# Patient Record
Sex: MALE | Race: WHITE | NOT HISPANIC OR LATINO
[De-identification: names, ages, dates, MRNs, and addresses within clinical notes are randomized per-mention and may not be internally consistent; named-entity substitution may affect disease eponyms.]

---

## 2017-01-11 ENCOUNTER — MESSAGE (OUTPATIENT)
Age: 67
End: 2017-01-11

## 2017-02-13 ENCOUNTER — RX RENEWAL (OUTPATIENT)
Age: 67
End: 2017-02-13

## 2017-02-14 ENCOUNTER — APPOINTMENT (OUTPATIENT)
Dept: ORTHOPEDIC SURGERY | Facility: CLINIC | Age: 67
End: 2017-02-14

## 2017-02-14 DIAGNOSIS — Z78.9 OTHER SPECIFIED HEALTH STATUS: ICD-10-CM

## 2017-02-14 DIAGNOSIS — Z56.0 UNEMPLOYMENT, UNSPECIFIED: ICD-10-CM

## 2017-02-14 DIAGNOSIS — M75.41 IMPINGEMENT SYNDROME OF RIGHT SHOULDER: ICD-10-CM

## 2017-02-14 DIAGNOSIS — Z86.79 PERSONAL HISTORY OF OTHER DISEASES OF THE CIRCULATORY SYSTEM: ICD-10-CM

## 2017-02-14 SDOH — ECONOMIC STABILITY - INCOME SECURITY: UNEMPLOYMENT, UNSPECIFIED: Z56.0

## 2017-02-23 ENCOUNTER — RX RENEWAL (OUTPATIENT)
Age: 67
End: 2017-02-23

## 2017-03-06 ENCOUNTER — RX RENEWAL (OUTPATIENT)
Age: 67
End: 2017-03-06

## 2017-03-28 ENCOUNTER — APPOINTMENT (OUTPATIENT)
Dept: ORTHOPEDIC SURGERY | Facility: CLINIC | Age: 67
End: 2017-03-28

## 2017-03-28 DIAGNOSIS — M75.41 IMPINGEMENT SYNDROME OF RIGHT SHOULDER: ICD-10-CM

## 2017-04-03 ENCOUNTER — RX RENEWAL (OUTPATIENT)
Age: 67
End: 2017-04-03

## 2017-05-05 ENCOUNTER — NON-APPOINTMENT (OUTPATIENT)
Age: 67
End: 2017-05-05

## 2017-05-05 ENCOUNTER — APPOINTMENT (OUTPATIENT)
Dept: CARDIOLOGY | Facility: CLINIC | Age: 67
End: 2017-05-05

## 2017-05-05 VITALS
BODY MASS INDEX: 26.2 KG/M2 | HEART RATE: 77 BPM | HEIGHT: 66 IN | DIASTOLIC BLOOD PRESSURE: 86 MMHG | SYSTOLIC BLOOD PRESSURE: 154 MMHG | WEIGHT: 163 LBS | OXYGEN SATURATION: 98 %

## 2017-05-19 ENCOUNTER — LABORATORY RESULT (OUTPATIENT)
Age: 67
End: 2017-05-19

## 2017-05-19 ENCOUNTER — APPOINTMENT (OUTPATIENT)
Dept: INTERNAL MEDICINE | Facility: CLINIC | Age: 67
End: 2017-05-19

## 2017-05-19 VITALS
RESPIRATION RATE: 17 BRPM | BODY MASS INDEX: 26.03 KG/M2 | TEMPERATURE: 97.7 F | HEART RATE: 88 BPM | SYSTOLIC BLOOD PRESSURE: 140 MMHG | HEIGHT: 66 IN | WEIGHT: 162 LBS | OXYGEN SATURATION: 96 % | DIASTOLIC BLOOD PRESSURE: 92 MMHG

## 2017-05-19 VITALS — SYSTOLIC BLOOD PRESSURE: 128 MMHG | DIASTOLIC BLOOD PRESSURE: 84 MMHG

## 2017-05-19 DIAGNOSIS — Z23 ENCOUNTER FOR IMMUNIZATION: ICD-10-CM

## 2017-05-24 LAB
25(OH)D3 SERPL-MCNC: 38.2 NG/ML
ALBUMIN SERPL ELPH-MCNC: 4.8 G/DL
ALP BLD-CCNC: 110 U/L
ALT SERPL-CCNC: 18 U/L
ANION GAP SERPL CALC-SCNC: 17 MMOL/L
APPEARANCE: CLEAR
AST SERPL-CCNC: 19 U/L
BASOPHILS # BLD AUTO: 0.03 K/UL
BASOPHILS NFR BLD AUTO: 0.2 %
BILIRUB SERPL-MCNC: 0.7 MG/DL
BILIRUBIN URINE: NEGATIVE
BLOOD URINE: NEGATIVE
BUN SERPL-MCNC: 22 MG/DL
CALCIUM SERPL-MCNC: 9.9 MG/DL
CHLORIDE SERPL-SCNC: 98 MMOL/L
CHOLEST SERPL-MCNC: 187 MG/DL
CHOLEST/HDLC SERPL: 3.5 RATIO
CO2 SERPL-SCNC: 24 MMOL/L
COLOR: YELLOW
CREAT SERPL-MCNC: 1.19 MG/DL
CREAT SPEC-SCNC: 97 MG/DL
EOSINOPHIL # BLD AUTO: 0.17 K/UL
EOSINOPHIL NFR BLD AUTO: 1.3 %
FOLATE SERPL-MCNC: 15 NG/ML
GLUCOSE QUALITATIVE U: NORMAL MG/DL
GLUCOSE SERPL-MCNC: 157 MG/DL
HBA1C MFR BLD HPLC: 6.8 %
HCT VFR BLD CALC: 48.2 %
HCV AB SER QL: NONREACTIVE
HCV S/CO RATIO: 0.08 S/CO
HDLC SERPL-MCNC: 54 MG/DL
HGB BLD-MCNC: 16.3 G/DL
IMM GRANULOCYTES NFR BLD AUTO: 0.6 %
KETONES URINE: NEGATIVE
LDLC SERPL CALC-MCNC: 96 MG/DL
LEUKOCYTE ESTERASE URINE: NEGATIVE
LYMPHOCYTES # BLD AUTO: 1.96 K/UL
LYMPHOCYTES NFR BLD AUTO: 15 %
MAN DIFF?: NORMAL
MCHC RBC-ENTMCNC: 29.4 PG
MCHC RBC-ENTMCNC: 33.8 GM/DL
MCV RBC AUTO: 87 FL
MICROALBUMIN 24H UR DL<=1MG/L-MCNC: 20.4 MG/DL
MICROALBUMIN/CREAT 24H UR-RTO: 210
MONOCYTES # BLD AUTO: 1.2 K/UL
MONOCYTES NFR BLD AUTO: 9.2 %
NEUTROPHILS # BLD AUTO: 9.59 K/UL
NEUTROPHILS NFR BLD AUTO: 73.7 %
NITRITE URINE: NEGATIVE
PH URINE: 5.5
PLATELET # BLD AUTO: 250 K/UL
POTASSIUM SERPL-SCNC: 4.2 MMOL/L
PROT SERPL-MCNC: 7.1 G/DL
PROTEIN URINE: 30 MG/DL
PSA SERPL-MCNC: 1.81 NG/ML
RBC # BLD: 5.54 M/UL
RBC # FLD: 15.3 %
SODIUM SERPL-SCNC: 139 MMOL/L
SPECIFIC GRAVITY URINE: 1.02
TRIGL SERPL-MCNC: 186 MG/DL
TSH SERPL-ACNC: 1.15 UIU/ML
UROBILINOGEN URINE: NORMAL MG/DL
VIT B12 SERPL-MCNC: 644 PG/ML
WBC # FLD AUTO: 13.03 K/UL

## 2017-05-26 ENCOUNTER — MESSAGE (OUTPATIENT)
Age: 67
End: 2017-05-26

## 2017-06-01 ENCOUNTER — RX RENEWAL (OUTPATIENT)
Age: 67
End: 2017-06-01

## 2017-06-20 ENCOUNTER — RX RENEWAL (OUTPATIENT)
Age: 67
End: 2017-06-20

## 2017-08-16 ENCOUNTER — RX RENEWAL (OUTPATIENT)
Age: 67
End: 2017-08-16

## 2017-08-25 ENCOUNTER — RX RENEWAL (OUTPATIENT)
Age: 67
End: 2017-08-25

## 2017-08-31 ENCOUNTER — RX RENEWAL (OUTPATIENT)
Age: 67
End: 2017-08-31

## 2017-08-31 ENCOUNTER — MEDICATION RENEWAL (OUTPATIENT)
Age: 67
End: 2017-08-31

## 2017-09-24 ENCOUNTER — RX RENEWAL (OUTPATIENT)
Age: 67
End: 2017-09-24

## 2017-10-02 ENCOUNTER — RX RENEWAL (OUTPATIENT)
Age: 67
End: 2017-10-02

## 2017-10-04 ENCOUNTER — MEDICATION RENEWAL (OUTPATIENT)
Age: 67
End: 2017-10-04

## 2017-10-31 ENCOUNTER — APPOINTMENT (OUTPATIENT)
Dept: CARDIOLOGY | Facility: CLINIC | Age: 67
End: 2017-10-31
Payer: COMMERCIAL

## 2017-10-31 VITALS
HEART RATE: 78 BPM | WEIGHT: 162 LBS | SYSTOLIC BLOOD PRESSURE: 158 MMHG | OXYGEN SATURATION: 97 % | DIASTOLIC BLOOD PRESSURE: 98 MMHG | BODY MASS INDEX: 26.03 KG/M2 | HEIGHT: 66 IN

## 2017-10-31 VITALS — DIASTOLIC BLOOD PRESSURE: 80 MMHG | SYSTOLIC BLOOD PRESSURE: 130 MMHG

## 2017-10-31 PROCEDURE — 93000 ELECTROCARDIOGRAM COMPLETE: CPT

## 2017-10-31 PROCEDURE — 99215 OFFICE O/P EST HI 40 MIN: CPT

## 2017-11-26 ENCOUNTER — RX RENEWAL (OUTPATIENT)
Age: 67
End: 2017-11-26

## 2017-12-01 ENCOUNTER — MESSAGE (OUTPATIENT)
Age: 67
End: 2017-12-01

## 2017-12-03 ENCOUNTER — FORM ENCOUNTER (OUTPATIENT)
Age: 67
End: 2017-12-03

## 2017-12-04 ENCOUNTER — APPOINTMENT (OUTPATIENT)
Dept: INTERNAL MEDICINE | Facility: CLINIC | Age: 67
End: 2017-12-04
Payer: COMMERCIAL

## 2017-12-04 ENCOUNTER — OUTPATIENT (OUTPATIENT)
Dept: OUTPATIENT SERVICES | Facility: HOSPITAL | Age: 67
LOS: 1 days | End: 2017-12-04
Payer: COMMERCIAL

## 2017-12-04 ENCOUNTER — APPOINTMENT (OUTPATIENT)
Dept: RADIOLOGY | Facility: CLINIC | Age: 67
End: 2017-12-04
Payer: COMMERCIAL

## 2017-12-04 ENCOUNTER — RX RENEWAL (OUTPATIENT)
Age: 67
End: 2017-12-04

## 2017-12-04 VITALS
RESPIRATION RATE: 17 BRPM | DIASTOLIC BLOOD PRESSURE: 90 MMHG | HEIGHT: 66 IN | BODY MASS INDEX: 26.03 KG/M2 | WEIGHT: 162 LBS | OXYGEN SATURATION: 98 % | HEART RATE: 83 BPM | SYSTOLIC BLOOD PRESSURE: 140 MMHG | TEMPERATURE: 97.9 F

## 2017-12-04 DIAGNOSIS — J06.9 ACUTE UPPER RESPIRATORY INFECTION, UNSPECIFIED: ICD-10-CM

## 2017-12-04 DIAGNOSIS — Z87.09 PERSONAL HISTORY OF OTHER DISEASES OF THE RESPIRATORY SYSTEM: ICD-10-CM

## 2017-12-04 DIAGNOSIS — R05 COUGH: ICD-10-CM

## 2017-12-04 PROCEDURE — 71046 X-RAY EXAM CHEST 2 VIEWS: CPT

## 2017-12-04 PROCEDURE — 71020: CPT | Mod: 26

## 2017-12-04 PROCEDURE — 99214 OFFICE O/P EST MOD 30 MIN: CPT

## 2017-12-08 ENCOUNTER — MESSAGE (OUTPATIENT)
Age: 67
End: 2017-12-08

## 2017-12-18 ENCOUNTER — RX RENEWAL (OUTPATIENT)
Age: 67
End: 2017-12-18

## 2017-12-21 ENCOUNTER — OTHER (OUTPATIENT)
Age: 67
End: 2017-12-21

## 2017-12-21 ENCOUNTER — MEDICATION RENEWAL (OUTPATIENT)
Age: 67
End: 2017-12-21

## 2017-12-31 ENCOUNTER — RX RENEWAL (OUTPATIENT)
Age: 67
End: 2017-12-31

## 2018-01-29 ENCOUNTER — RX RENEWAL (OUTPATIENT)
Age: 68
End: 2018-01-29

## 2018-02-09 ENCOUNTER — LABORATORY RESULT (OUTPATIENT)
Age: 68
End: 2018-02-09

## 2018-02-09 ENCOUNTER — APPOINTMENT (OUTPATIENT)
Dept: INTERNAL MEDICINE | Facility: CLINIC | Age: 68
End: 2018-02-09
Payer: COMMERCIAL

## 2018-02-09 VITALS
HEART RATE: 84 BPM | DIASTOLIC BLOOD PRESSURE: 90 MMHG | OXYGEN SATURATION: 97 % | BODY MASS INDEX: 26.36 KG/M2 | HEIGHT: 66 IN | RESPIRATION RATE: 17 BRPM | TEMPERATURE: 97.5 F | WEIGHT: 164 LBS | SYSTOLIC BLOOD PRESSURE: 137 MMHG

## 2018-02-09 DIAGNOSIS — Z80.8 FAMILY HISTORY OF MALIGNANT NEOPLASM OF OTHER ORGANS OR SYSTEMS: ICD-10-CM

## 2018-02-09 PROCEDURE — 99214 OFFICE O/P EST MOD 30 MIN: CPT

## 2018-02-09 RX ORDER — BENZONATATE 200 MG/1
200 CAPSULE ORAL
Qty: 10 | Refills: 0 | Status: DISCONTINUED | COMMUNITY
Start: 2017-12-04 | End: 2018-02-09

## 2018-02-09 RX ORDER — AMOXICILLIN AND CLAVULANATE POTASSIUM 875; 125 MG/1; MG/1
875-125 TABLET, COATED ORAL TWICE DAILY
Qty: 14 | Refills: 0 | Status: DISCONTINUED | COMMUNITY
Start: 2017-12-08 | End: 2018-02-09

## 2018-02-13 ENCOUNTER — MESSAGE (OUTPATIENT)
Age: 68
End: 2018-02-13

## 2018-02-13 LAB
25(OH)D3 SERPL-MCNC: 35.9 NG/ML
ALBUMIN SERPL ELPH-MCNC: 4.8 G/DL
ALP BLD-CCNC: 119 U/L
ALT SERPL-CCNC: 29 U/L
ANION GAP SERPL CALC-SCNC: 20 MMOL/L
APPEARANCE: CLEAR
AST SERPL-CCNC: 24 U/L
BASOPHILS # BLD AUTO: 0.04 K/UL
BASOPHILS NFR BLD AUTO: 0.4 %
BILIRUB SERPL-MCNC: 1.1 MG/DL
BILIRUBIN URINE: NEGATIVE
BLOOD URINE: NEGATIVE
BUN SERPL-MCNC: 20 MG/DL
CALCIUM SERPL-MCNC: 9.7 MG/DL
CHLORIDE SERPL-SCNC: 100 MMOL/L
CHOLEST SERPL-MCNC: 200 MG/DL
CHOLEST/HDLC SERPL: 3.6 RATIO
CO2 SERPL-SCNC: 22 MMOL/L
COLOR: YELLOW
CREAT SERPL-MCNC: 1.18 MG/DL
CREAT SPEC-SCNC: 95 MG/DL
EOSINOPHIL # BLD AUTO: 0.13 K/UL
EOSINOPHIL NFR BLD AUTO: 1.2 %
GLUCOSE QUALITATIVE U: NEGATIVE MG/DL
GLUCOSE SERPL-MCNC: 155 MG/DL
HBA1C MFR BLD HPLC: 7.3 %
HCT VFR BLD CALC: 50.6 %
HDLC SERPL-MCNC: 55 MG/DL
HGB BLD-MCNC: 17.3 G/DL
IMM GRANULOCYTES NFR BLD AUTO: 0.4 %
KETONES URINE: NEGATIVE
LDLC SERPL CALC-MCNC: 107 MG/DL
LEUKOCYTE ESTERASE URINE: NEGATIVE
LYMPHOCYTES # BLD AUTO: 1.84 K/UL
LYMPHOCYTES NFR BLD AUTO: 16.4 %
MAN DIFF?: NORMAL
MCHC RBC-ENTMCNC: 30.2 PG
MCHC RBC-ENTMCNC: 34.2 GM/DL
MCV RBC AUTO: 88.3 FL
MICROALBUMIN 24H UR DL<=1MG/L-MCNC: 26.9 MG/DL
MICROALBUMIN/CREAT 24H UR-RTO: 283 MG/G
MONOCYTES # BLD AUTO: 1.17 K/UL
MONOCYTES NFR BLD AUTO: 10.4 %
NEUTROPHILS # BLD AUTO: 8.01 K/UL
NEUTROPHILS NFR BLD AUTO: 71.2 %
NITRITE URINE: NEGATIVE
PH URINE: 5.5
PLATELET # BLD AUTO: 239 K/UL
POTASSIUM SERPL-SCNC: 5.1 MMOL/L
PROT SERPL-MCNC: 7.6 G/DL
PROTEIN URINE: 30 MG/DL
RBC # BLD: 5.73 M/UL
RBC # FLD: 15.1 %
SODIUM SERPL-SCNC: 142 MMOL/L
SPECIFIC GRAVITY URINE: 1.02
TRIGL SERPL-MCNC: 190 MG/DL
TSH SERPL-ACNC: 1.2 UIU/ML
UROBILINOGEN URINE: NEGATIVE MG/DL
WBC # FLD AUTO: 11.24 K/UL

## 2018-02-27 ENCOUNTER — MESSAGE (OUTPATIENT)
Age: 68
End: 2018-02-27

## 2018-03-14 ENCOUNTER — RX RENEWAL (OUTPATIENT)
Age: 68
End: 2018-03-14

## 2018-03-27 ENCOUNTER — RX RENEWAL (OUTPATIENT)
Age: 68
End: 2018-03-27

## 2018-04-23 ENCOUNTER — RX RENEWAL (OUTPATIENT)
Age: 68
End: 2018-04-23

## 2018-04-30 ENCOUNTER — APPOINTMENT (OUTPATIENT)
Dept: NEPHROLOGY | Facility: CLINIC | Age: 68
End: 2018-04-30
Payer: COMMERCIAL

## 2018-04-30 ENCOUNTER — RX RENEWAL (OUTPATIENT)
Age: 68
End: 2018-04-30

## 2018-04-30 VITALS
WEIGHT: 162 LBS | HEART RATE: 80 BPM | HEIGHT: 66 IN | BODY MASS INDEX: 26.03 KG/M2 | DIASTOLIC BLOOD PRESSURE: 80 MMHG | SYSTOLIC BLOOD PRESSURE: 144 MMHG

## 2018-04-30 PROCEDURE — 99244 OFF/OP CNSLTJ NEW/EST MOD 40: CPT

## 2018-05-09 ENCOUNTER — RX RENEWAL (OUTPATIENT)
Age: 68
End: 2018-05-09

## 2018-05-11 ENCOUNTER — APPOINTMENT (OUTPATIENT)
Dept: CARDIOLOGY | Facility: CLINIC | Age: 68
End: 2018-05-11
Payer: COMMERCIAL

## 2018-05-11 ENCOUNTER — NON-APPOINTMENT (OUTPATIENT)
Age: 68
End: 2018-05-11

## 2018-05-11 VITALS
HEART RATE: 101 BPM | HEIGHT: 66 IN | SYSTOLIC BLOOD PRESSURE: 159 MMHG | BODY MASS INDEX: 25.71 KG/M2 | DIASTOLIC BLOOD PRESSURE: 108 MMHG | OXYGEN SATURATION: 97 % | WEIGHT: 160 LBS

## 2018-05-11 VITALS — DIASTOLIC BLOOD PRESSURE: 88 MMHG | SYSTOLIC BLOOD PRESSURE: 128 MMHG

## 2018-05-11 PROCEDURE — 99214 OFFICE O/P EST MOD 30 MIN: CPT

## 2018-05-11 PROCEDURE — 93000 ELECTROCARDIOGRAM COMPLETE: CPT

## 2018-05-22 ENCOUNTER — RX RENEWAL (OUTPATIENT)
Age: 68
End: 2018-05-22

## 2018-05-24 ENCOUNTER — MEDICATION RENEWAL (OUTPATIENT)
Age: 68
End: 2018-05-24

## 2018-05-29 ENCOUNTER — RX RENEWAL (OUTPATIENT)
Age: 68
End: 2018-05-29

## 2018-06-01 ENCOUNTER — APPOINTMENT (OUTPATIENT)
Dept: INTERNAL MEDICINE | Facility: CLINIC | Age: 68
End: 2018-06-01
Payer: COMMERCIAL

## 2018-06-01 ENCOUNTER — MED ADMIN CHARGE (OUTPATIENT)
Age: 68
End: 2018-06-01

## 2018-06-01 VITALS
SYSTOLIC BLOOD PRESSURE: 131 MMHG | OXYGEN SATURATION: 96 % | BODY MASS INDEX: 26.52 KG/M2 | HEIGHT: 66 IN | RESPIRATION RATE: 17 BRPM | DIASTOLIC BLOOD PRESSURE: 87 MMHG | HEART RATE: 97 BPM | WEIGHT: 165 LBS | TEMPERATURE: 98.1 F

## 2018-06-01 DIAGNOSIS — Z23 ENCOUNTER FOR IMMUNIZATION: ICD-10-CM

## 2018-06-01 DIAGNOSIS — Z00.00 ENCOUNTER FOR GENERAL ADULT MEDICAL EXAMINATION W/OUT ABNORMAL FINDINGS: ICD-10-CM

## 2018-06-01 PROCEDURE — 90471 IMMUNIZATION ADMIN: CPT

## 2018-06-01 PROCEDURE — 99397 PER PM REEVAL EST PAT 65+ YR: CPT | Mod: 25

## 2018-06-01 PROCEDURE — 90732 PPSV23 VACC 2 YRS+ SUBQ/IM: CPT

## 2018-06-01 NOTE — HISTORY OF PRESENT ILLNESS
[FreeTextEntry1] : I am here for my physical [de-identified] : \par \par Presents for evaluation of annual physical. \par \par \par interval history\par The patient is being seen for a health maintenance evaluation. \par General Health: The patient's health since the last visit is described as good. He denies vision problems. \par He has hearing loss. \par Lifestyle:. He consumes a diverse and healthy diet. He does not have any weight concerns. He exercises regularly. He does not use tobacco. He denies alcohol use. He denies drug use. sees audiologist, Dr leblanc. \par Reproductive health:. He denies erectile dysfunction. \par Screening: Prostate cancer screening includes prostate-specific antigen testing last year. Colorectal cancer screening includes a colonoscopy within the past ten years Dr bruce. \par Metabolic screening includes lipid profile performed within the past five years, glucose screening performed last year and thyroid function test performed last year. \par sees cardiologist for HTN DR MARTÍNEZ wise\par sun damaged skin sees, dermatology Dr murray\par has had melanoma in the past\par ophthalmologist Dr Greco/ fabiola \par \par \par he has permanent atrial fibrillation on anticoagulation. He reports no bleeding.\par \par He has diabetes on medications. \par He has hypertension controlled with medications. He is under the care of a cardiologist.\par he has polycythemia evaluated by hematology in the past\par

## 2018-06-01 NOTE — HEALTH RISK ASSESSMENT
[Good] : ~his/her~  mood as  good [No falls in past year] : Patient reported no falls in the past year [0] : 2) Feeling down, depressed, or hopeless: Not at all (0) [With Family] : lives with family [] :  [# Of Children ___] : has [unfilled] children [Fully functional (bathing, dressing, toileting, transferring, walking, feeding)] : Fully functional (bathing, dressing, toileting, transferring, walking, feeding) [Fully functional (using the telephone, shopping, preparing meals, housekeeping, doing laundry, using] : Fully functional and needs no help or supervision to perform IADLs (using the telephone, shopping, preparing meals, housekeeping, doing laundry, using transportation, managing medications and managing finances) [Smoke Detector] : smoke detector [Carbon Monoxide Detector] : carbon monoxide detector [Seat Belt] :  uses seat belt [Sunscreen] : uses sunscreen [] : No [Change in mental status noted] : No change in mental status noted [Reports changes in hearing] : Reports no changes in hearing [Reports changes in vision] : Reports no changes in vision [ColonoscopyDate] : 2013 [ColonoscopyComments] : DR Mathis [FreeTextEntry4] : wife HCP

## 2018-06-01 NOTE — ASSESSMENT
[FreeTextEntry1] : \par annual physical\par fasting blood work sent\par specialty care followup\par prevnar 13\par today\par dermatologist follow up \par pneumococcemia 23  in one year\par received shingrix\par \par \par

## 2018-06-01 NOTE — PHYSICAL EXAM
[Normal Sphincter Tone] : normal sphincter tone [Prostate Enlargement] : the prostate was not enlarged [Stool Occult Blood] : no occult stool [de-identified] : lower lip crusted  /   granulomatous tissue [de-identified] : sun damaged skin

## 2018-06-04 ENCOUNTER — RX RENEWAL (OUTPATIENT)
Age: 68
End: 2018-06-04

## 2018-06-05 ENCOUNTER — RX RENEWAL (OUTPATIENT)
Age: 68
End: 2018-06-05

## 2018-06-05 LAB
ALBUMIN SERPL ELPH-MCNC: 4.8 G/DL
ALP BLD-CCNC: 92 U/L
ALT SERPL-CCNC: 27 U/L
ANION GAP SERPL CALC-SCNC: 16 MMOL/L
AST SERPL-CCNC: 23 U/L
BASOPHILS # BLD AUTO: 0.05 K/UL
BASOPHILS NFR BLD AUTO: 0.4 %
BILIRUB SERPL-MCNC: 0.9 MG/DL
BUN SERPL-MCNC: 26 MG/DL
CALCIUM SERPL-MCNC: 9.4 MG/DL
CHLORIDE SERPL-SCNC: 100 MMOL/L
CHOLEST SERPL-MCNC: 160 MG/DL
CHOLEST/HDLC SERPL: 3.1 RATIO
CO2 SERPL-SCNC: 21 MMOL/L
CREAT SERPL-MCNC: 1.19 MG/DL
EOSINOPHIL # BLD AUTO: 0.13 K/UL
EOSINOPHIL NFR BLD AUTO: 1.2 %
GLUCOSE SERPL-MCNC: 138 MG/DL
HBA1C MFR BLD HPLC: 7 %
HCT VFR BLD CALC: 46.4 %
HDLC SERPL-MCNC: 51 MG/DL
HGB BLD-MCNC: 15.5 G/DL
IMM GRANULOCYTES NFR BLD AUTO: 0.9 %
LDLC SERPL CALC-MCNC: 72 MG/DL
LYMPHOCYTES # BLD AUTO: 2.06 K/UL
LYMPHOCYTES NFR BLD AUTO: 18.3 %
MAN DIFF?: NORMAL
MCHC RBC-ENTMCNC: 29 PG
MCHC RBC-ENTMCNC: 33.4 GM/DL
MCV RBC AUTO: 86.7 FL
MONOCYTES # BLD AUTO: 0.97 K/UL
MONOCYTES NFR BLD AUTO: 8.6 %
NEUTROPHILS # BLD AUTO: 7.96 K/UL
NEUTROPHILS NFR BLD AUTO: 70.6 %
PLATELET # BLD AUTO: 225 K/UL
POTASSIUM SERPL-SCNC: 4.6 MMOL/L
PROT SERPL-MCNC: 7.1 G/DL
PSA SERPL-MCNC: 1.62 NG/ML
RBC # BLD: 5.35 M/UL
RBC # FLD: 14.7 %
SODIUM SERPL-SCNC: 137 MMOL/L
TRIGL SERPL-MCNC: 185 MG/DL
TSH SERPL-ACNC: 1.06 UIU/ML
WBC # FLD AUTO: 11.27 K/UL

## 2018-06-06 ENCOUNTER — MESSAGE (OUTPATIENT)
Age: 68
End: 2018-06-06

## 2018-08-14 ENCOUNTER — MESSAGE (OUTPATIENT)
Age: 68
End: 2018-08-14

## 2018-08-16 ENCOUNTER — RX RENEWAL (OUTPATIENT)
Age: 68
End: 2018-08-16

## 2018-08-17 ENCOUNTER — MESSAGE (OUTPATIENT)
Age: 68
End: 2018-08-17

## 2018-08-27 ENCOUNTER — MESSAGE (OUTPATIENT)
Age: 68
End: 2018-08-27

## 2018-08-28 ENCOUNTER — MESSAGE (OUTPATIENT)
Age: 68
End: 2018-08-28

## 2018-09-05 ENCOUNTER — APPOINTMENT (OUTPATIENT)
Dept: CARDIOLOGY | Facility: CLINIC | Age: 68
End: 2018-09-05
Payer: COMMERCIAL

## 2018-09-05 PROCEDURE — 93306 TTE W/DOPPLER COMPLETE: CPT

## 2018-09-07 ENCOUNTER — MESSAGE (OUTPATIENT)
Age: 68
End: 2018-09-07

## 2018-10-01 ENCOUNTER — APPOINTMENT (OUTPATIENT)
Dept: CARDIOLOGY | Facility: CLINIC | Age: 68
End: 2018-10-01
Payer: COMMERCIAL

## 2018-10-01 ENCOUNTER — NON-APPOINTMENT (OUTPATIENT)
Age: 68
End: 2018-10-01

## 2018-10-01 VITALS
HEIGHT: 66 IN | DIASTOLIC BLOOD PRESSURE: 135 MMHG | WEIGHT: 162 LBS | HEART RATE: 93 BPM | BODY MASS INDEX: 26.03 KG/M2 | SYSTOLIC BLOOD PRESSURE: 172 MMHG | OXYGEN SATURATION: 95 %

## 2018-10-01 VITALS — SYSTOLIC BLOOD PRESSURE: 140 MMHG | DIASTOLIC BLOOD PRESSURE: 90 MMHG

## 2018-10-01 PROCEDURE — 93000 ELECTROCARDIOGRAM COMPLETE: CPT

## 2018-10-01 PROCEDURE — 99214 OFFICE O/P EST MOD 30 MIN: CPT

## 2018-10-05 ENCOUNTER — OUTPATIENT (OUTPATIENT)
Dept: OUTPATIENT SERVICES | Facility: HOSPITAL | Age: 68
LOS: 1 days | Discharge: HOME | End: 2018-10-05

## 2018-10-05 DIAGNOSIS — R78.89 FINDING OF OTHER SPECIFIED SUBSTANCES, NOT NORMALLY FOUND IN BLOOD: ICD-10-CM

## 2018-10-15 ENCOUNTER — APPOINTMENT (OUTPATIENT)
Dept: HEMATOLOGY ONCOLOGY | Facility: CLINIC | Age: 68
End: 2018-10-15
Payer: COMMERCIAL

## 2018-10-15 ENCOUNTER — RESULT REVIEW (OUTPATIENT)
Age: 68
End: 2018-10-15

## 2018-10-15 ENCOUNTER — OUTPATIENT (OUTPATIENT)
Dept: OUTPATIENT SERVICES | Facility: HOSPITAL | Age: 68
LOS: 1 days | End: 2018-10-15
Payer: COMMERCIAL

## 2018-10-15 VITALS
BODY MASS INDEX: 26.44 KG/M2 | DIASTOLIC BLOOD PRESSURE: 92 MMHG | OXYGEN SATURATION: 98 % | TEMPERATURE: 98.6 F | RESPIRATION RATE: 16 BRPM | SYSTOLIC BLOOD PRESSURE: 158 MMHG | HEART RATE: 100 BPM | WEIGHT: 163.78 LBS

## 2018-10-15 DIAGNOSIS — R78.89 FINDING OF OTHER SPECIFIED SUBSTANCES, NOT NORMALLY FOUND IN BLOOD: ICD-10-CM

## 2018-10-15 DIAGNOSIS — Z86.2 PERSONAL HISTORY OF DISEASES OF THE BLOOD AND BLOOD-FORMING ORGANS AND CERTAIN DISORDERS INVOLVING THE IMMUNE MECHANISM: ICD-10-CM

## 2018-10-15 DIAGNOSIS — D72.9 DISORDER OF WHITE BLOOD CELLS, UNSPECIFIED: ICD-10-CM

## 2018-10-15 DIAGNOSIS — D72.821 MONOCYTOSIS (SYMPTOMATIC): ICD-10-CM

## 2018-10-15 LAB
BASOPHILS # BLD AUTO: 0 K/UL — SIGNIFICANT CHANGE UP (ref 0–0.2)
BASOPHILS NFR BLD AUTO: 0.2 % — SIGNIFICANT CHANGE UP (ref 0–2)
EOSINOPHIL # BLD AUTO: 0.4 K/UL — SIGNIFICANT CHANGE UP (ref 0–0.5)
EOSINOPHIL NFR BLD AUTO: 2.9 % — SIGNIFICANT CHANGE UP (ref 0–6)
HCT VFR BLD CALC: 43.7 % — SIGNIFICANT CHANGE UP (ref 39–50)
HGB BLD-MCNC: 15 G/DL — SIGNIFICANT CHANGE UP (ref 13–17)
LYMPHOCYTES # BLD AUTO: 1 K/UL — SIGNIFICANT CHANGE UP (ref 1–3.3)
LYMPHOCYTES # BLD AUTO: 7.6 % — LOW (ref 13–44)
MCHC RBC-ENTMCNC: 29.5 PG — SIGNIFICANT CHANGE UP (ref 27–34)
MCHC RBC-ENTMCNC: 34.3 G/DL — SIGNIFICANT CHANGE UP (ref 32–36)
MCV RBC AUTO: 85.8 FL — SIGNIFICANT CHANGE UP (ref 80–100)
MONOCYTES # BLD AUTO: 1.3 K/UL — HIGH (ref 0–0.9)
MONOCYTES NFR BLD AUTO: 10.2 % — SIGNIFICANT CHANGE UP (ref 2–14)
NEUTROPHILS # BLD AUTO: 10.3 K/UL — HIGH (ref 1.8–7.4)
NEUTROPHILS NFR BLD AUTO: 79.1 % — HIGH (ref 43–77)
PLATELET # BLD AUTO: 261 K/UL — SIGNIFICANT CHANGE UP (ref 150–400)
RBC # BLD: 5.09 M/UL — SIGNIFICANT CHANGE UP (ref 4.2–5.8)
RBC # FLD: 13.1 % — SIGNIFICANT CHANGE UP (ref 10.3–14.5)
WBC # BLD: 13 K/UL — HIGH (ref 3.8–10.5)
WBC # FLD AUTO: 13 K/UL — HIGH (ref 3.8–10.5)

## 2018-10-15 PROCEDURE — 88184 FLOWCYTOMETRY/ TC 1 MARKER: CPT

## 2018-10-15 PROCEDURE — G0452: CPT | Mod: 26

## 2018-10-15 PROCEDURE — 88185 FLOWCYTOMETRY/TC ADD-ON: CPT

## 2018-10-15 PROCEDURE — 81206 BCR/ABL1 GENE MAJOR BP: CPT

## 2018-10-15 PROCEDURE — 81207 BCR/ABL1 GENE MINOR BP: CPT

## 2018-10-15 PROCEDURE — 99215 OFFICE O/P EST HI 40 MIN: CPT

## 2018-10-15 PROCEDURE — 88189 FLOWCYTOMETRY/READ 16 & >: CPT

## 2018-10-17 LAB
BCR/ABL BY RT - PCR QUANTITATIVE: SIGNIFICANT CHANGE UP
TM INTERPRETATION: SIGNIFICANT CHANGE UP

## 2018-10-21 ENCOUNTER — FORM ENCOUNTER (OUTPATIENT)
Age: 68
End: 2018-10-21

## 2018-10-22 ENCOUNTER — OUTPATIENT (OUTPATIENT)
Dept: OUTPATIENT SERVICES | Facility: HOSPITAL | Age: 68
LOS: 1 days | End: 2018-10-22
Payer: COMMERCIAL

## 2018-10-22 ENCOUNTER — APPOINTMENT (OUTPATIENT)
Dept: INTERNAL MEDICINE | Facility: CLINIC | Age: 68
End: 2018-10-22
Payer: COMMERCIAL

## 2018-10-22 ENCOUNTER — APPOINTMENT (OUTPATIENT)
Dept: RADIOLOGY | Facility: CLINIC | Age: 68
End: 2018-10-22

## 2018-10-22 VITALS
OXYGEN SATURATION: 97 % | HEIGHT: 66 IN | HEART RATE: 95 BPM | WEIGHT: 165 LBS | DIASTOLIC BLOOD PRESSURE: 69 MMHG | RESPIRATION RATE: 16 BRPM | BODY MASS INDEX: 26.52 KG/M2 | SYSTOLIC BLOOD PRESSURE: 112 MMHG | TEMPERATURE: 97.7 F

## 2018-10-22 DIAGNOSIS — Z00.8 ENCOUNTER FOR OTHER GENERAL EXAMINATION: ICD-10-CM

## 2018-10-22 DIAGNOSIS — J06.9 ACUTE UPPER RESPIRATORY INFECTION, UNSPECIFIED: ICD-10-CM

## 2018-10-22 PROCEDURE — 71046 X-RAY EXAM CHEST 2 VIEWS: CPT

## 2018-10-22 PROCEDURE — 99214 OFFICE O/P EST MOD 30 MIN: CPT

## 2018-10-22 PROCEDURE — 71046 X-RAY EXAM CHEST 2 VIEWS: CPT | Mod: 26

## 2018-10-22 NOTE — HISTORY OF PRESENT ILLNESS
[FreeTextEntry8] : acute visit,  new problem\par \par presents for patient of cough\par For evaluation of cough\par He gets coughing spells associated with talking, deep breathing\par Sometimes worse when lying supine\par Duration 25 days\par He recalls having a URI about 3 weeks ago\par With a sore throat,  feeling chilled\par Has had a cough since his infection\par not taking anything for it\par Also noticed decrease appetite until yesterday\par For the previous week\par Cough is bothersome for him\par He denies chest pain or shortness of breath\par he take medicine for reflux\par on olmesartan for HTN\par reports feeling frustrated with office staff today\par here for assessment\par \par  \par

## 2018-10-22 NOTE — REVIEW OF SYSTEMS
[Fever] : no fever [Chills] : chills [Fatigue] : fatigue [Pain] : no pain [Earache] : no earache [Hearing Loss] : no hearing loss [Nosebleeds] : no nosebleeds [Nasal Discharge] : nasal discharge [Sore Throat] : sore throat [Chest Pain] : no chest pain [Palpitations] : no palpitations [Claudication] : no  leg claudication [Shortness Of Breath] : no shortness of breath [Wheezing] : no wheezing [Cough] : cough [Abdominal Pain] : no abdominal pain [Nausea] : no nausea [Constipation] : no constipation [Joint Pain] : no joint pain [Joint Stiffness] : no joint stiffness

## 2018-10-22 NOTE — PHYSICAL EXAM
[No Acute Distress] : no acute distress [Well Nourished] : well nourished [Well Developed] : well developed [Well-Appearing] : well-appearing [Normal Sclera/Conjunctiva] : normal sclera/conjunctiva [PERRL] : pupils equal round and reactive to light [EOMI] : extraocular movements intact [Normal Outer Ear/Nose] : the outer ears and nose were normal in appearance [Normal Oropharynx] : the oropharynx was normal [No JVD] : no jugular venous distention [Supple] : supple [No Lymphadenopathy] : no lymphadenopathy [Clear to Auscultation] : lungs were clear to auscultation bilaterally [No Accessory Muscle Use] : no accessory muscle use [Normal Rate] : normal rate  [Regular Rhythm] : with a regular rhythm [Normal S1, S2] : normal S1 and S2 [No Murmur] : no murmur heard [No Carotid Bruits] : no carotid bruits [No Abdominal Bruit] : a ~M bruit was not heard ~T in the abdomen [No Varicosities] : no varicosities [No Edema] : there was no peripheral edema [No Extremity Clubbing/Cyanosis] : no extremity clubbing/cyanosis [Soft] : abdomen soft [Non Tender] : non-tender [Non-distended] : non-distended [No Masses] : no abdominal mass palpated [No HSM] : no HSM [Normal Bowel Sounds] : normal bowel sounds [Normal Posterior Cervical Nodes] : no posterior cervical lymphadenopathy [Normal Anterior Cervical Nodes] : no anterior cervical lymphadenopathy [No CVA Tenderness] : no CVA  tenderness [No Spinal Tenderness] : no spinal tenderness [No Joint Swelling] : no joint swelling [Grossly Normal Strength/Tone] : grossly normal strength/tone [No Rash] : no rash [de-identified] : coughing with speaking

## 2018-11-01 ENCOUNTER — APPOINTMENT (OUTPATIENT)
Dept: INTERNAL MEDICINE | Facility: CLINIC | Age: 68
End: 2018-11-01
Payer: COMMERCIAL

## 2018-11-01 VITALS
DIASTOLIC BLOOD PRESSURE: 90 MMHG | OXYGEN SATURATION: 98 % | HEIGHT: 66 IN | BODY MASS INDEX: 26.52 KG/M2 | SYSTOLIC BLOOD PRESSURE: 132 MMHG | TEMPERATURE: 98.7 F | HEART RATE: 81 BPM | WEIGHT: 165 LBS

## 2018-11-01 DIAGNOSIS — Z86.010 PERSONAL HISTORY OF COLONIC POLYPS: ICD-10-CM

## 2018-11-01 PROCEDURE — 99214 OFFICE O/P EST MOD 30 MIN: CPT

## 2018-11-01 PROCEDURE — 99204 OFFICE O/P NEW MOD 45 MIN: CPT

## 2018-11-05 ENCOUNTER — APPOINTMENT (OUTPATIENT)
Dept: INTERNAL MEDICINE | Facility: CLINIC | Age: 68
End: 2018-11-05

## 2018-11-07 ENCOUNTER — RX RENEWAL (OUTPATIENT)
Age: 68
End: 2018-11-07

## 2018-11-19 ENCOUNTER — APPOINTMENT (OUTPATIENT)
Dept: NEPHROLOGY | Facility: CLINIC | Age: 68
End: 2018-11-19
Payer: COMMERCIAL

## 2018-11-19 VITALS
HEART RATE: 98 BPM | OXYGEN SATURATION: 99 % | SYSTOLIC BLOOD PRESSURE: 161 MMHG | BODY MASS INDEX: 25.86 KG/M2 | HEIGHT: 66 IN | DIASTOLIC BLOOD PRESSURE: 106 MMHG | WEIGHT: 160.93 LBS

## 2018-11-19 VITALS — HEART RATE: 86 BPM | SYSTOLIC BLOOD PRESSURE: 136 MMHG | DIASTOLIC BLOOD PRESSURE: 86 MMHG

## 2018-11-19 PROCEDURE — 99214 OFFICE O/P EST MOD 30 MIN: CPT

## 2018-11-24 LAB
APPEARANCE: CLEAR
BACTERIA: NEGATIVE
BILIRUBIN URINE: NEGATIVE
BLOOD URINE: NEGATIVE
COLOR: YELLOW
CREAT SPEC-SCNC: 123 MG/DL
GLUCOSE QUALITATIVE U: NEGATIVE MG/DL
HYALINE CASTS: 1 /LPF
KETONES URINE: NEGATIVE
LEUKOCYTE ESTERASE URINE: NEGATIVE
MICROALBUMIN 24H UR DL<=1MG/L-MCNC: 15.7 MG/DL
MICROALBUMIN/CREAT 24H UR-RTO: 128 MG/G
MICROSCOPIC-UA: NORMAL
NITRITE URINE: NEGATIVE
PH URINE: 6
PROTEIN URINE: 30 MG/DL
RED BLOOD CELLS URINE: 2 /HPF
SPECIFIC GRAVITY URINE: 1.02
SQUAMOUS EPITHELIAL CELLS: 1 /HPF
UROBILINOGEN URINE: NEGATIVE MG/DL
WHITE BLOOD CELLS URINE: 1 /HPF

## 2018-12-13 ENCOUNTER — LABORATORY RESULT (OUTPATIENT)
Age: 68
End: 2018-12-13

## 2018-12-13 ENCOUNTER — APPOINTMENT (OUTPATIENT)
Dept: INTERNAL MEDICINE | Facility: CLINIC | Age: 68
End: 2018-12-13
Payer: COMMERCIAL

## 2018-12-13 DIAGNOSIS — Z12.11 ENCOUNTER FOR SCREENING FOR MALIGNANT NEOPLASM OF COLON: ICD-10-CM

## 2018-12-13 DIAGNOSIS — K44.9 DIAPHRAGMATIC HERNIA W/OUT OBSTRUCTION OR GANGRENE: ICD-10-CM

## 2018-12-13 DIAGNOSIS — R19.4 CHANGE IN BOWEL HABIT: ICD-10-CM

## 2018-12-13 DIAGNOSIS — D12.2 BENIGN NEOPLASM OF ASCENDING COLON: ICD-10-CM

## 2018-12-13 DIAGNOSIS — K31.89 OTHER DISEASES OF STOMACH AND DUODENUM: ICD-10-CM

## 2018-12-13 PROCEDURE — 45385 COLONOSCOPY W/LESION REMOVAL: CPT

## 2018-12-13 PROCEDURE — 43239 EGD BIOPSY SINGLE/MULTIPLE: CPT | Mod: 52

## 2018-12-21 ENCOUNTER — APPOINTMENT (OUTPATIENT)
Dept: CARDIOLOGY | Facility: CLINIC | Age: 68
End: 2018-12-21
Payer: COMMERCIAL

## 2018-12-21 ENCOUNTER — NON-APPOINTMENT (OUTPATIENT)
Age: 68
End: 2018-12-21

## 2018-12-21 VITALS
HEIGHT: 66 IN | WEIGHT: 158 LBS | BODY MASS INDEX: 25.39 KG/M2 | DIASTOLIC BLOOD PRESSURE: 97 MMHG | OXYGEN SATURATION: 98 % | HEART RATE: 90 BPM | SYSTOLIC BLOOD PRESSURE: 145 MMHG

## 2018-12-21 VITALS — DIASTOLIC BLOOD PRESSURE: 84 MMHG | SYSTOLIC BLOOD PRESSURE: 132 MMHG

## 2018-12-21 DIAGNOSIS — R55 SYNCOPE AND COLLAPSE: ICD-10-CM

## 2018-12-21 PROCEDURE — 93000 ELECTROCARDIOGRAM COMPLETE: CPT

## 2018-12-21 PROCEDURE — 99214 OFFICE O/P EST MOD 30 MIN: CPT

## 2018-12-21 NOTE — REVIEW OF SYSTEMS
The patient would like to speak with Dr. Yao about the continued back pain and the recommendations from other providers.  The patient would like to have Dr. Yao's input on the options that he has.  An appointment was made for the patient to be seen on 5-15-17.   Is there any possibility of being seen earlier?  The request is being sent to the TC of silver team as well as the silver team and the provider.  Please notify the patient.    Robbin Vanegas, MSN, RN, PHN  [facility] Care Coordinator  [facility]  Phone: [phone]  [email or web address]      [see HPI] : see HPI [Shortness Of Breath] : no shortness of breath [Dyspnea on exertion] : not dyspnea during exertion [Chest  Pressure] : no chest pressure [Chest Pain] : no chest pain [Lower Ext Edema] : no extremity edema [Leg Claudication] : no intermittent leg claudication [Palpitations] : no palpitations [Joint Pain] : joint pain [Negative] : Heme/Lymph

## 2018-12-21 NOTE — HISTORY OF PRESENT ILLNESS
[FreeTextEntry1] : 68 year old man with a history of permanent atrial fibrillation, HTN, HLD, allergic conjunctivitis who is here for a followup.  \par \par He is no longer coughing. He still exercises with playing tennis and softball regularly. He denies palpitations, dyspnea, PND, orthopnea, lower extremity edema, LOC. He has been taking his Eliquis as prescribed without any melena, excessive bleeding, bruising.\par Recently he was in a bar, and drinking alcohol and had a syncopal episode. He went to ED and got IVF fluids. \par   \par He has not been staying well hydrated.  He has been taking care of two kids. \par He is compliant with his medications. Though he is missing his middle dose of hydralazine. \par

## 2018-12-21 NOTE — DISCUSSION/SUMMARY
[FreeTextEntry1] : 68 year old man with HTN, HLD, and permanent atrial fibrillation who presents to the office for follow-up. \par Rex is doing well overall. His syncopal episode was vagal in nature. he will try to stay hydrated. \par He denies any anginal symptoms. Clinically he is euvolemic on exam if not still volume depleted. \par  \par  He is still in AF. His heart rate is well controlled.  There are no signs or symptoms of abnormal bleeding, and he will continue Eliquis for stroke risk reduction. At present, I see no benefit to restoring NSR. \par His blood pressure is labile. During his visit, his hypertension decreased but is still elevated towards the end. He will continue his current regiment. I will change his Hydralazine 50mg Q12. \par His last lipid profile is at goal. \par His most recent echo in 2018 showed normal LV function. he has mild LVH. \par Atrovent intranasal for post nasal drip. \par Exercise, diet and lifestyle modification counseling was performed  to reduce his CV risk.  \par \par He will follow with me in 3 months. he will follow up with you fall of his other medical issues.

## 2018-12-21 NOTE — REASON FOR VISIT
[Follow-Up - Clinic] : a clinic follow-up of [Anticoagulation] : anticoagulation [Atrial Fibrillation] : atrial fibrillation [Hyperlipidemia] : hyperlipidemia [Hypertension] : hypertension [Medication Management] : Medication management

## 2018-12-21 NOTE — PHYSICAL EXAM
[Normal Appearance] : normal appearance [Well Groomed] : well groomed [General Appearance - In No Acute Distress] : no acute distress [Normal Conjunctiva] : the conjunctiva exhibited no abnormalities [Eyelids - No Xanthelasma] : the eyelids demonstrated no xanthelasmas [No Oral Pallor] : no oral pallor [No Oral Cyanosis] : no oral cyanosis [FreeTextEntry1] : dry MM [Normal Jugular Venous V Waves Present] : normal jugular venous V waves present [Respiration, Rhythm And Depth] : normal respiratory rhythm and effort [Exaggerated Use Of Accessory Muscles For Inspiration] : no accessory muscle use [Auscultation Breath Sounds / Voice Sounds] : lungs were clear to auscultation bilaterally [Bowel Sounds] : normal bowel sounds [Abdomen Soft] : soft [Abdomen Tenderness] : non-tender [Abnormal Walk] : normal gait [Gait - Sufficient For Exercise Testing] : the gait was sufficient for exercise testing [Nail Clubbing] : no clubbing of the fingernails [Cyanosis, Localized] : no localized cyanosis [Petechial Hemorrhages (___cm)] : no petechial hemorrhages [Skin Color & Pigmentation] : normal skin color and pigmentation [] : no rash [Oriented To Time, Place, And Person] : oriented to person, place, and time [Affect] : the affect was normal [Mood] : the mood was normal [No Anxiety] : not feeling anxious [Not Palpable] : not palpable [No Precordial Heave] : no precordial heave was noted [Normal Rate] : normal [Irregularly Irregular] : irregularly irregular [Normal S1] : normal S1 [Normal S2] : normal S2 [No Gallop] : no gallop heard [No Murmur] : no murmurs heard [2+] : left 2+ [Right Carotid Bruit] : no bruit heard over the right carotid [Left Carotid Bruit] : no bruit heard over the left carotid [No Abnormalities] : the abdominal aorta was not enlarged and no bruit was heard [Bruit] : no bruit heard [No Pitting Edema] : no pitting edema present

## 2018-12-21 NOTE — CARDIOLOGY SUMMARY
[___] : [unfilled] [No Ischemia] : no Ischemia [None] : no pulmonary hypertension [Enlarged] : enlarged LA size

## 2019-01-09 ENCOUNTER — RX RENEWAL (OUTPATIENT)
Age: 69
End: 2019-01-09

## 2019-01-22 ENCOUNTER — APPOINTMENT (OUTPATIENT)
Dept: INTERNAL MEDICINE | Facility: CLINIC | Age: 69
End: 2019-01-22
Payer: COMMERCIAL

## 2019-01-22 VITALS
HEART RATE: 70 BPM | DIASTOLIC BLOOD PRESSURE: 87 MMHG | BODY MASS INDEX: 26.2 KG/M2 | OXYGEN SATURATION: 99 % | WEIGHT: 163 LBS | SYSTOLIC BLOOD PRESSURE: 126 MMHG | HEIGHT: 66 IN | TEMPERATURE: 98 F | RESPIRATION RATE: 17 BRPM

## 2019-01-22 DIAGNOSIS — R60.0 LOCALIZED EDEMA: ICD-10-CM

## 2019-01-22 PROCEDURE — 99214 OFFICE O/P EST MOD 30 MIN: CPT

## 2019-01-22 RX ORDER — BENZONATATE 200 MG/1
200 CAPSULE ORAL 3 TIMES DAILY
Qty: 15 | Refills: 0 | Status: DISCONTINUED | COMMUNITY
Start: 2018-10-22 | End: 2019-01-22

## 2019-01-23 LAB
ANION GAP SERPL CALC-SCNC: 15 MMOL/L
BUN SERPL-MCNC: 25 MG/DL
CALCIUM SERPL-MCNC: 9.7 MG/DL
CHLORIDE SERPL-SCNC: 100 MMOL/L
CO2 SERPL-SCNC: 25 MMOL/L
CREAT SERPL-MCNC: 1.24 MG/DL
GLUCOSE SERPL-MCNC: 161 MG/DL
POTASSIUM SERPL-SCNC: 4.5 MMOL/L
SODIUM SERPL-SCNC: 140 MMOL/L

## 2019-02-07 ENCOUNTER — MEDICATION RENEWAL (OUTPATIENT)
Age: 69
End: 2019-02-07

## 2019-02-21 ENCOUNTER — RX RENEWAL (OUTPATIENT)
Age: 69
End: 2019-02-21

## 2019-03-24 ENCOUNTER — RX RENEWAL (OUTPATIENT)
Age: 69
End: 2019-03-24

## 2019-03-25 ENCOUNTER — MEDICATION RENEWAL (OUTPATIENT)
Age: 69
End: 2019-03-25

## 2019-03-25 ENCOUNTER — RX CHANGE (OUTPATIENT)
Age: 69
End: 2019-03-25

## 2019-03-25 RX ORDER — FUROSEMIDE 40 MG/1
40 TABLET ORAL DAILY
Qty: 10 | Refills: 0 | Status: DISCONTINUED | COMMUNITY
Start: 2019-01-22 | End: 2019-03-25

## 2019-03-25 RX ORDER — OLMESARTAN MEDOXOMIL 40 MG/1
40 TABLET, FILM COATED ORAL DAILY
Qty: 10 | Refills: 0 | Status: DISCONTINUED | COMMUNITY
Start: 2019-01-22 | End: 2019-03-25

## 2019-03-25 RX ORDER — METFORMIN HYDROCHLORIDE 500 MG/1
500 TABLET, COATED ORAL
Qty: 90 | Refills: 0 | Status: DISCONTINUED | COMMUNITY
Start: 2018-05-25 | End: 2019-03-25

## 2019-03-26 ENCOUNTER — TRANSCRIPTION ENCOUNTER (OUTPATIENT)
Age: 69
End: 2019-03-26

## 2019-04-19 ENCOUNTER — MEDICATION RENEWAL (OUTPATIENT)
Age: 69
End: 2019-04-19

## 2019-06-14 ENCOUNTER — APPOINTMENT (OUTPATIENT)
Dept: NEPHROLOGY | Facility: CLINIC | Age: 69
End: 2019-06-14
Payer: COMMERCIAL

## 2019-06-14 VITALS
BODY MASS INDEX: 25.86 KG/M2 | OXYGEN SATURATION: 99 % | SYSTOLIC BLOOD PRESSURE: 130 MMHG | HEIGHT: 66 IN | DIASTOLIC BLOOD PRESSURE: 90 MMHG | HEART RATE: 63 BPM | WEIGHT: 160.93 LBS

## 2019-06-14 VITALS — DIASTOLIC BLOOD PRESSURE: 80 MMHG | HEART RATE: 80 BPM | SYSTOLIC BLOOD PRESSURE: 124 MMHG

## 2019-06-14 PROCEDURE — 99213 OFFICE O/P EST LOW 20 MIN: CPT

## 2019-06-15 NOTE — PHYSICAL EXAM
[General Appearance - Alert] : alert [General Appearance - In No Acute Distress] : in no acute distress [General Appearance - Well Nourished] : well nourished [Outer Ear] : the ears and nose were normal in appearance [Sclera] : the sclera and conjunctiva were normal [Jugular Venous Distention Increased] : there was no jugular-venous distention [Neck Appearance] : the appearance of the neck was normal [Auscultation Breath Sounds / Voice Sounds] : lungs were clear to auscultation bilaterally [Murmurs] : no murmurs [Edema] : there was no peripheral edema [Heart Sounds Pericardial Friction Rub] : no pericardial rub [Abdomen Tenderness] : non-tender [Abdomen Soft] : soft [Bowel Sounds] : normal bowel sounds [] : no rash [No CVA Tenderness] : no ~M costovertebral angle tenderness [Involuntary Movements] : no involuntary movements were seen [No Focal Deficits] : no focal deficits [FreeTextEntry1] : sun exposure [Affect] : the affect was normal [Mood] : the mood was normal [Oriented To Time, Place, And Person] : oriented to person, place, and time

## 2019-06-15 NOTE — HISTORY OF PRESENT ILLNESS
[FreeTextEntry1] : 70 yo male with long standing DM, HTN here for evaluation of proteinuria\par No NSAID\par He had rose:cr ratio that have been elevated for a couple years. Renal sono was unremarkable except for cysts\par Exercising regularly playing tennis or golf

## 2019-06-15 NOTE — ASSESSMENT
[FreeTextEntry1] : 70 yo male with DM, HTN and proteinuria\par Preserved renal function\par Proteinuria likely secondary to Dm and HTN. Monitor\par Dm: advised the importance of DM control\par He is on ARB therapy and now on HCTZ and epleronone\par HTN: BP at goal\par Hep C and B neg and CHARI neg\par 6 months

## 2019-06-15 NOTE — REVIEW OF SYSTEMS
[Skin Lesions] : skin lesion [Nocturia] : nocturia [FreeTextEntry5] : afib [Negative] : Heme/Lymph [de-identified] : sees derm

## 2019-06-19 ENCOUNTER — APPOINTMENT (OUTPATIENT)
Dept: CARDIOLOGY | Facility: CLINIC | Age: 69
End: 2019-06-19
Payer: COMMERCIAL

## 2019-06-19 ENCOUNTER — NON-APPOINTMENT (OUTPATIENT)
Age: 69
End: 2019-06-19

## 2019-06-19 ENCOUNTER — OTHER (OUTPATIENT)
Age: 69
End: 2019-06-19

## 2019-06-19 VITALS
HEIGHT: 66 IN | HEART RATE: 87 BPM | SYSTOLIC BLOOD PRESSURE: 155 MMHG | OXYGEN SATURATION: 98 % | BODY MASS INDEX: 25.71 KG/M2 | DIASTOLIC BLOOD PRESSURE: 95 MMHG | WEIGHT: 160 LBS

## 2019-06-19 VITALS — DIASTOLIC BLOOD PRESSURE: 84 MMHG | SYSTOLIC BLOOD PRESSURE: 124 MMHG

## 2019-06-19 PROCEDURE — 93000 ELECTROCARDIOGRAM COMPLETE: CPT

## 2019-06-19 PROCEDURE — 99214 OFFICE O/P EST MOD 30 MIN: CPT

## 2019-06-19 NOTE — REVIEW OF SYSTEMS
[Shortness Of Breath] : no shortness of breath [see HPI] : see HPI [Dyspnea on exertion] : not dyspnea during exertion [Chest  Pressure] : no chest pressure [Chest Pain] : no chest pain [Leg Claudication] : no intermittent leg claudication [Lower Ext Edema] : no extremity edema [Palpitations] : no palpitations [Joint Pain] : joint pain [Negative] : Heme/Lymph

## 2019-06-19 NOTE — DISCUSSION/SUMMARY
[FreeTextEntry1] : 69 year old man with HTN, HLD, and permanent atrial fibrillation who presents to the office for follow-up. \par Rex is doing well overall. His syncopal episode was vagal in nature. he will try to stay hydrated. \par He denies any anginal symptoms. Clinically he is euvolemic on exam if not still volume depleted. \par  \par  He is still in AF. His heart rate is well controlled.  There are no signs or symptoms of abnormal bleeding, and he will continue Eliquis for stroke risk reduction. At present, I see no benefit to restoring NSR. \par \par His blood pressure is labile but likely now dependent on his salt intake.  During his visit, his hypertension decreased but is still elevated towards the end. He will continue his current regiment. He will continue Hydralazine 50mg Q12. \par \par His last lipid profile is at goal. \par His most recent echo in 2018 showed normal LV function. he has mild LVH. \par  \par Exercise, diet and lifestyle modification counseling was performed  to reduce his CV risk.  \par \par He will follow with me in 6 months. he will follow up with you fall of his other medical issues.

## 2019-06-19 NOTE — HISTORY OF PRESENT ILLNESS
[FreeTextEntry1] : 69 year old man with a history of permanent atrial fibrillation, HTN, HLD, allergic conjunctivitis who is here for a followup.  \par \par He has been doing well. \par He is doing well. He still exercises with playing tennis and softball regularly. He denies palpitations, dyspnea, PND, orthopnea, lower extremity edema, LOC. He has been taking his Eliquis as prescribed without any melena, excessive bleeding, bruising.\par He has been trying to stay better hydrated. \par He is compliant with his medications.  \par

## 2019-06-19 NOTE — PHYSICAL EXAM
[Well Groomed] : well groomed [Normal Appearance] : normal appearance [General Appearance - In No Acute Distress] : no acute distress [Normal Conjunctiva] : the conjunctiva exhibited no abnormalities [Eyelids - No Xanthelasma] : the eyelids demonstrated no xanthelasmas [No Oral Pallor] : no oral pallor [No Oral Cyanosis] : no oral cyanosis [FreeTextEntry1] : dry MM [Normal Jugular Venous V Waves Present] : normal jugular venous V waves present [Respiration, Rhythm And Depth] : normal respiratory rhythm and effort [Exaggerated Use Of Accessory Muscles For Inspiration] : no accessory muscle use [Auscultation Breath Sounds / Voice Sounds] : lungs were clear to auscultation bilaterally [Bowel Sounds] : normal bowel sounds [Abdomen Tenderness] : non-tender [Abdomen Soft] : soft [Abnormal Walk] : normal gait [Gait - Sufficient For Exercise Testing] : the gait was sufficient for exercise testing [Nail Clubbing] : no clubbing of the fingernails [Petechial Hemorrhages (___cm)] : no petechial hemorrhages [Cyanosis, Localized] : no localized cyanosis [Skin Color & Pigmentation] : normal skin color and pigmentation [] : no rash [Affect] : the affect was normal [Oriented To Time, Place, And Person] : oriented to person, place, and time [Mood] : the mood was normal [No Anxiety] : not feeling anxious [Not Palpable] : not palpable [No Precordial Heave] : no precordial heave was noted [Normal Rate] : normal [Irregularly Irregular] : irregularly irregular [Normal S2] : normal S2 [Normal S1] : normal S1 [No Gallop] : no gallop heard [No Murmur] : no murmurs heard [2+] : left 2+ [Right Carotid Bruit] : no bruit heard over the right carotid [Left Carotid Bruit] : no bruit heard over the left carotid [No Abnormalities] : the abdominal aorta was not enlarged and no bruit was heard [Bruit] : no bruit heard [No Pitting Edema] : no pitting edema present

## 2019-06-21 ENCOUNTER — MEDICATION RENEWAL (OUTPATIENT)
Age: 69
End: 2019-06-21

## 2019-07-10 ENCOUNTER — APPOINTMENT (OUTPATIENT)
Dept: INTERNAL MEDICINE | Facility: CLINIC | Age: 69
End: 2019-07-10
Payer: COMMERCIAL

## 2019-07-10 VITALS
BODY MASS INDEX: 26.03 KG/M2 | DIASTOLIC BLOOD PRESSURE: 80 MMHG | HEART RATE: 90 BPM | TEMPERATURE: 97.6 F | HEIGHT: 66 IN | SYSTOLIC BLOOD PRESSURE: 140 MMHG | OXYGEN SATURATION: 98 % | RESPIRATION RATE: 17 BRPM | WEIGHT: 162 LBS

## 2019-07-10 DIAGNOSIS — Z00.00 ENCOUNTER FOR GENERAL ADULT MEDICAL EXAMINATION W/OUT ABNORMAL FINDINGS: ICD-10-CM

## 2019-07-10 PROCEDURE — 90732 PPSV23 VACC 2 YRS+ SUBQ/IM: CPT

## 2019-07-10 PROCEDURE — G0009: CPT

## 2019-07-10 PROCEDURE — 99397 PER PM REEVAL EST PAT 65+ YR: CPT | Mod: 25

## 2019-07-10 RX ORDER — SODIUM SULFATE, POTASSIUM SULFATE, MAGNESIUM SULFATE 17.5; 3.13; 1.6 G/ML; G/ML; G/ML
17.5-3.13-1.6 SOLUTION, CONCENTRATE ORAL
Qty: 1 | Refills: 0 | Status: DISCONTINUED | COMMUNITY
Start: 2018-11-08 | End: 2019-07-10

## 2019-07-13 LAB
25(OH)D3 SERPL-MCNC: 24.8 NG/ML
ALBUMIN SERPL ELPH-MCNC: 4.7 G/DL
ALP BLD-CCNC: 93 U/L
ALT SERPL-CCNC: 21 U/L
ANION GAP SERPL CALC-SCNC: 17 MMOL/L
APPEARANCE: CLEAR
AST SERPL-CCNC: 16 U/L
BASOPHILS # BLD AUTO: 0.09 K/UL
BASOPHILS NFR BLD AUTO: 0.6 %
BILIRUB SERPL-MCNC: 0.5 MG/DL
BILIRUBIN URINE: NEGATIVE
BLOOD URINE: NEGATIVE
BUN SERPL-MCNC: 24 MG/DL
CALCIUM SERPL-MCNC: 9.8 MG/DL
CHLORIDE SERPL-SCNC: 100 MMOL/L
CHOLEST SERPL-MCNC: 161 MG/DL
CHOLEST/HDLC SERPL: 3.1 RATIO
CO2 SERPL-SCNC: 20 MMOL/L
COLOR: NORMAL
CREAT SERPL-MCNC: 1.44 MG/DL
CREAT SPEC-SCNC: 80 MG/DL
EOSINOPHIL # BLD AUTO: 0.66 K/UL
EOSINOPHIL NFR BLD AUTO: 4.7 %
ESTIMATED AVERAGE GLUCOSE: 154 MG/DL
GLUCOSE QUALITATIVE U: NEGATIVE
GLUCOSE SERPL-MCNC: 150 MG/DL
HBA1C MFR BLD HPLC: 7 %
HCT VFR BLD CALC: 47.4 %
HCV AB SER QL: NONREACTIVE
HCV S/CO RATIO: 0.07 S/CO
HDLC SERPL-MCNC: 52 MG/DL
HGB BLD-MCNC: 15.3 G/DL
IMM GRANULOCYTES NFR BLD AUTO: 1.4 %
KETONES URINE: NEGATIVE
LDLC SERPL CALC-MCNC: 80 MG/DL
LEUKOCYTE ESTERASE URINE: NEGATIVE
LYMPHOCYTES # BLD AUTO: 1.68 K/UL
LYMPHOCYTES NFR BLD AUTO: 11.9 %
MAN DIFF?: NORMAL
MCHC RBC-ENTMCNC: 28.8 PG
MCHC RBC-ENTMCNC: 32.3 GM/DL
MCV RBC AUTO: 89.3 FL
MICROALBUMIN 24H UR DL<=1MG/L-MCNC: 8.1 MG/DL
MICROALBUMIN/CREAT 24H UR-RTO: 102 MG/G
MONOCYTES # BLD AUTO: 1.5 K/UL
MONOCYTES NFR BLD AUTO: 10.6 %
NEUTROPHILS # BLD AUTO: 10 K/UL
NEUTROPHILS NFR BLD AUTO: 70.8 %
NITRITE URINE: NEGATIVE
PH URINE: 6
PLATELET # BLD AUTO: 250 K/UL
POTASSIUM SERPL-SCNC: 4.7 MMOL/L
PROT SERPL-MCNC: 6.8 G/DL
PROTEIN URINE: NORMAL
PSA SERPL-MCNC: 1.65 NG/ML
RBC # BLD: 5.31 M/UL
RBC # FLD: 14.6 %
SODIUM SERPL-SCNC: 137 MMOL/L
SPECIFIC GRAVITY URINE: 1.02
T4 FREE SERPL-MCNC: 1.4 NG/DL
TRIGL SERPL-MCNC: 143 MG/DL
TSH SERPL-ACNC: 1.13 UIU/ML
UROBILINOGEN URINE: NORMAL
VIT B12 SERPL-MCNC: 268 PG/ML
WBC # FLD AUTO: 14.13 K/UL

## 2019-07-16 ENCOUNTER — MESSAGE (OUTPATIENT)
Age: 69
End: 2019-07-16

## 2019-07-16 ENCOUNTER — OTHER (OUTPATIENT)
Age: 69
End: 2019-07-16

## 2019-09-11 ENCOUNTER — MEDICATION RENEWAL (OUTPATIENT)
Age: 69
End: 2019-09-11

## 2019-10-11 ENCOUNTER — MEDICATION RENEWAL (OUTPATIENT)
Age: 69
End: 2019-10-11

## 2019-11-18 ENCOUNTER — RX RENEWAL (OUTPATIENT)
Age: 69
End: 2019-11-18

## 2019-12-02 ENCOUNTER — APPOINTMENT (OUTPATIENT)
Dept: INTERNAL MEDICINE | Facility: CLINIC | Age: 69
End: 2019-12-02
Payer: COMMERCIAL

## 2019-12-02 VITALS
HEART RATE: 77 BPM | BODY MASS INDEX: 25.71 KG/M2 | SYSTOLIC BLOOD PRESSURE: 126 MMHG | HEIGHT: 66 IN | TEMPERATURE: 78 F | WEIGHT: 160 LBS | DIASTOLIC BLOOD PRESSURE: 78 MMHG

## 2019-12-02 VITALS — OXYGEN SATURATION: 99 % | HEART RATE: 92 BPM

## 2019-12-02 DIAGNOSIS — D72.829 ELEVATED WHITE BLOOD CELL COUNT, UNSPECIFIED: ICD-10-CM

## 2019-12-02 PROCEDURE — 36415 COLL VENOUS BLD VENIPUNCTURE: CPT

## 2019-12-02 PROCEDURE — 99204 OFFICE O/P NEW MOD 45 MIN: CPT | Mod: 25

## 2019-12-02 PROCEDURE — 99214 OFFICE O/P EST MOD 30 MIN: CPT

## 2019-12-02 RX ORDER — ECONAZOLE NITRATE 10 MG/G
1 AEROSOL, FOAM TOPICAL
Qty: 70 | Refills: 0 | Status: DISCONTINUED | COMMUNITY
Start: 2018-02-01 | End: 2019-12-02

## 2019-12-02 RX ORDER — IPRATROPIUM BROMIDE 21 UG/1
0.03 SPRAY NASAL 3 TIMES DAILY
Qty: 1 | Refills: 4 | Status: DISCONTINUED | COMMUNITY
Start: 2018-10-01 | End: 2019-12-02

## 2019-12-02 RX ORDER — BUDESONIDE AND FORMOTEROL FUMARATE DIHYDRATE 160; 4.5 UG/1; UG/1
160-4.5 AEROSOL RESPIRATORY (INHALATION) TWICE DAILY
Qty: 1 | Refills: 0 | Status: DISCONTINUED | COMMUNITY
Start: 2018-10-22 | End: 2019-12-02

## 2019-12-02 NOTE — HISTORY OF PRESENT ILLNESS
[FreeTextEntry1] : Establish care [de-identified] : Establish care\par recent colon and egd\par had all vaccine\par \par high bp on med\par high wbc  possible p vera\par diabetes on med last a1c 7\par borderline low b12\par

## 2019-12-02 NOTE — PLAN
[FreeTextEntry1] : Re establish care\par afib on eliques and atenolol\par diabetes on med\par full blood

## 2019-12-02 NOTE — HEALTH RISK ASSESSMENT
[Very Good] : ~his/her~  mood as very good [Yes] : Yes [Monthly or less (1 pt)] : Monthly or less (1 point) [1 or 2 (0 pts)] : 1 or 2 (0 points) [0] : 2) Feeling down, depressed, or hopeless: Not at all (0) [Change in mental status noted] : No change in mental status noted [Behavior] : denies difficulty with behavior [Language] : denies difficulty with language [Handling Complex Tasks] : denies difficulty handling complex tasks [Spatial Ability and Orientation] : denies difficulty with spatial ability and orientation [Reasoning] : denies difficulty with reasoning

## 2019-12-03 ENCOUNTER — APPOINTMENT (OUTPATIENT)
Dept: CARDIOLOGY | Facility: CLINIC | Age: 69
End: 2019-12-03
Payer: COMMERCIAL

## 2019-12-03 ENCOUNTER — NON-APPOINTMENT (OUTPATIENT)
Age: 69
End: 2019-12-03

## 2019-12-03 VITALS
BODY MASS INDEX: 26.03 KG/M2 | WEIGHT: 162 LBS | SYSTOLIC BLOOD PRESSURE: 136 MMHG | HEART RATE: 98 BPM | OXYGEN SATURATION: 100 % | HEIGHT: 66 IN | DIASTOLIC BLOOD PRESSURE: 94 MMHG

## 2019-12-03 VITALS — DIASTOLIC BLOOD PRESSURE: 78 MMHG | SYSTOLIC BLOOD PRESSURE: 120 MMHG

## 2019-12-03 DIAGNOSIS — R06.09 OTHER FORMS OF DYSPNEA: ICD-10-CM

## 2019-12-03 LAB
24R-OH-CALCIDIOL SERPL-MCNC: 25.9 PG/ML
25(OH)D3 SERPL-MCNC: 32.3 NG/ML
ALBUMIN SERPL ELPH-MCNC: 4.3 G/DL
ALP BLD-CCNC: 73 U/L
ALT SERPL-CCNC: 23 U/L
ANION GAP SERPL CALC-SCNC: 16 MMOL/L
AST SERPL-CCNC: 20 U/L
BASOPHILS # BLD AUTO: 0.09 K/UL
BASOPHILS NFR BLD AUTO: 0.8 %
BILIRUB SERPL-MCNC: 0.6 MG/DL
BUN SERPL-MCNC: 25 MG/DL
CALCIUM SERPL-MCNC: 9.5 MG/DL
CHLORIDE SERPL-SCNC: 102 MMOL/L
CHOLEST SERPL-MCNC: 152 MG/DL
CHOLEST/HDLC SERPL: 3.2 RATIO
CO2 SERPL-SCNC: 21 MMOL/L
CREAT SERPL-MCNC: 1.03 MG/DL
EOSINOPHIL # BLD AUTO: 0.89 K/UL
EOSINOPHIL NFR BLD AUTO: 7.5 %
ESTIMATED AVERAGE GLUCOSE: 148 MG/DL
FOLATE SERPL-MCNC: 8.2 NG/ML
GLUCOSE SERPL-MCNC: 134 MG/DL
HBA1C MFR BLD HPLC: 6.8 %
HCT VFR BLD CALC: 48.2 %
HCV AB SER QL: NONREACTIVE
HCV S/CO RATIO: 0.08 S/CO
HDLC SERPL-MCNC: 47 MG/DL
HGB BLD-MCNC: 15.6 G/DL
IMM GRANULOCYTES NFR BLD AUTO: 1.1 %
LDLC SERPL CALC-MCNC: 68 MG/DL
LYMPHOCYTES # BLD AUTO: 1.11 K/UL
LYMPHOCYTES NFR BLD AUTO: 9.4 %
MAN DIFF?: NORMAL
MCHC RBC-ENTMCNC: 29.1 PG
MCHC RBC-ENTMCNC: 32.4 GM/DL
MCV RBC AUTO: 89.8 FL
MONOCYTES # BLD AUTO: 1.24 K/UL
MONOCYTES NFR BLD AUTO: 10.5 %
NEUTROPHILS # BLD AUTO: 8.4 K/UL
NEUTROPHILS NFR BLD AUTO: 70.7 %
PLATELET # BLD AUTO: 247 K/UL
POTASSIUM SERPL-SCNC: 4.1 MMOL/L
PROT SERPL-MCNC: 6.4 G/DL
RBC # BLD: 5.37 M/UL
RBC # FLD: 14.6 %
SODIUM SERPL-SCNC: 139 MMOL/L
T4 FREE SERPL-MCNC: 1.4 NG/DL
TRIGL SERPL-MCNC: 184 MG/DL
TSH SERPL-ACNC: 0.81 UIU/ML
VIT B12 SERPL-MCNC: 928 PG/ML
WBC # FLD AUTO: 11.86 K/UL

## 2019-12-03 PROCEDURE — 93000 ELECTROCARDIOGRAM COMPLETE: CPT

## 2019-12-03 PROCEDURE — 99214 OFFICE O/P EST MOD 30 MIN: CPT

## 2019-12-03 NOTE — DISCUSSION/SUMMARY
[FreeTextEntry1] : 69 year old man with HTN, HLD, and permanent atrial fibrillation who presents to the office for follow-up. \par Rex is doing well overall. He denies any anginal symptoms. Clinically he is euvolemic on exam. He has mild STACK. He will undergo a nuclear stress test to assess for underlying obstructive CAD. \par  \par  He is still in AF. His heart rate is well controlled.  There are no signs or symptoms of abnormal bleeding, and he will continue Eliquis for stroke risk reduction. At present, I see no benefit to restoring NSR. \par \par His blood pressure is controlled. He will continue his current regiment. He will continue Hydralazine 50mg Q12. \par \par His last lipid profile is at goal except for his triglycerides. He did not tolerate fish oil in the past. I will send him Vascepa. \par \par His most recent echo in 2018 showed normal LV function. he has mild LVH. \par  \par Exercise, diet and lifestyle modification counseling was performed  to reduce his CV risk.  \par \par He will follow with me in 6 months. he will follow up with you fall of his other medical issues.

## 2019-12-03 NOTE — PHYSICAL EXAM
[General Appearance - In No Acute Distress] : no acute distress [Normal Appearance] : normal appearance [Well Groomed] : well groomed [Eyelids - No Xanthelasma] : the eyelids demonstrated no xanthelasmas [Normal Conjunctiva] : the conjunctiva exhibited no abnormalities [No Oral Cyanosis] : no oral cyanosis [Normal Jugular Venous V Waves Present] : normal jugular venous V waves present [No Oral Pallor] : no oral pallor [FreeTextEntry1] : dry MM [Respiration, Rhythm And Depth] : normal respiratory rhythm and effort [Exaggerated Use Of Accessory Muscles For Inspiration] : no accessory muscle use [Bowel Sounds] : normal bowel sounds [Abdomen Soft] : soft [Auscultation Breath Sounds / Voice Sounds] : lungs were clear to auscultation bilaterally [Abdomen Tenderness] : non-tender [Abnormal Walk] : normal gait [Gait - Sufficient For Exercise Testing] : the gait was sufficient for exercise testing [Nail Clubbing] : no clubbing of the fingernails [Cyanosis, Localized] : no localized cyanosis [Petechial Hemorrhages (___cm)] : no petechial hemorrhages [Skin Color & Pigmentation] : normal skin color and pigmentation [] : no rash [Oriented To Time, Place, And Person] : oriented to person, place, and time [Affect] : the affect was normal [No Anxiety] : not feeling anxious [Not Palpable] : not palpable [Mood] : the mood was normal [No Precordial Heave] : no precordial heave was noted [Irregularly Irregular] : irregularly irregular [Normal Rate] : normal [Normal S1] : normal S1 [No Murmur] : no murmurs heard [No Gallop] : no gallop heard [Normal S2] : normal S2 [2+] : left 2+ [Right Carotid Bruit] : no bruit heard over the right carotid [Left Carotid Bruit] : no bruit heard over the left carotid [Bruit] : no bruit heard [No Pitting Edema] : no pitting edema present [No Abnormalities] : the abdominal aorta was not enlarged and no bruit was heard

## 2019-12-03 NOTE — CARDIOLOGY SUMMARY
[___] : [unfilled] [___] : [unfilled] [No Ischemia] : no Ischemia [None] : no pulmonary hypertension [Enlarged] : enlarged LA size

## 2019-12-03 NOTE — REVIEW OF SYSTEMS
[Shortness Of Breath] : no shortness of breath [see HPI] : see HPI [Dyspnea on exertion] : not dyspnea during exertion [Chest  Pressure] : no chest pressure [Lower Ext Edema] : no extremity edema [Chest Pain] : no chest pain [Leg Claudication] : no intermittent leg claudication [Joint Pain] : joint pain [Palpitations] : no palpitations [Negative] : Heme/Lymph

## 2019-12-03 NOTE — REASON FOR VISIT
[Follow-Up - Clinic] : a clinic follow-up of [Atrial Fibrillation] : atrial fibrillation [Anticoagulation] : anticoagulation [Hypertension] : hypertension [Hyperlipidemia] : hyperlipidemia [Medication Management] : Medication management

## 2019-12-04 LAB
MEV IGG FLD QL IA: >300 AU/ML
MEV IGG+IGM SER-IMP: POSITIVE
MUV AB SER-ACNC: POSITIVE
MUV IGG SER QL IA: 163 AU/ML
RUBV IGG FLD-ACNC: 8.6 INDEX
RUBV IGG SER-IMP: POSITIVE

## 2019-12-13 ENCOUNTER — APPOINTMENT (OUTPATIENT)
Dept: NEPHROLOGY | Facility: CLINIC | Age: 69
End: 2019-12-13
Payer: COMMERCIAL

## 2019-12-13 VITALS — SYSTOLIC BLOOD PRESSURE: 122 MMHG | DIASTOLIC BLOOD PRESSURE: 80 MMHG | HEART RATE: 78 BPM

## 2019-12-13 VITALS
HEIGHT: 66 IN | OXYGEN SATURATION: 96 % | SYSTOLIC BLOOD PRESSURE: 136 MMHG | BODY MASS INDEX: 26.82 KG/M2 | WEIGHT: 166.89 LBS | DIASTOLIC BLOOD PRESSURE: 92 MMHG | HEART RATE: 105 BPM

## 2019-12-13 PROCEDURE — 99213 OFFICE O/P EST LOW 20 MIN: CPT

## 2019-12-13 NOTE — ASSESSMENT
[FreeTextEntry1] : 70 yo male with DM, HTN and proteinuria\par Preserved renal function.  Variability in creatinine related to hydration status while on a diuretic and ARB.\par Proteinuria likely secondary to Dm and HTN. Monitor\par Dm: advised the importance of DM control\par He is on ARB therapy and now on HCTZ and epleronone\par HTN: BP at goal\par Hep C and B neg and CHARI neg\par 1 year follow-up

## 2019-12-13 NOTE — REVIEW OF SYSTEMS
[Skin Lesions] : skin lesion [Nocturia] : nocturia [Negative] : Heme/Lymph [FreeTextEntry5] : afib [de-identified] : sees derm

## 2019-12-13 NOTE — HISTORY OF PRESENT ILLNESS
[FreeTextEntry1] : 68 yo male with long standing DM, HTN here for follow-up of proteinuria\par No NSAID\par Renal sono was unremarkable except for cysts\par Exercising regularly playing tennis or golf.  May not be drinking enough water

## 2019-12-13 NOTE — PHYSICAL EXAM
[General Appearance - In No Acute Distress] : in no acute distress [General Appearance - Well Nourished] : well nourished [General Appearance - Alert] : alert [Sclera] : the sclera and conjunctiva were normal [Outer Ear] : the ears and nose were normal in appearance [Neck Appearance] : the appearance of the neck was normal [Auscultation Breath Sounds / Voice Sounds] : lungs were clear to auscultation bilaterally [Jugular Venous Distention Increased] : there was no jugular-venous distention [Murmurs] : no murmurs [Heart Sounds Pericardial Friction Rub] : no pericardial rub [Edema] : there was no peripheral edema [Bowel Sounds] : normal bowel sounds [No CVA Tenderness] : no ~M costovertebral angle tenderness [Abdomen Soft] : soft [Abdomen Tenderness] : non-tender [] : no rash [Involuntary Movements] : no involuntary movements were seen [FreeTextEntry1] : sun exposure [No Focal Deficits] : no focal deficits [Oriented To Time, Place, And Person] : oriented to person, place, and time [Affect] : the affect was normal [Mood] : the mood was normal

## 2019-12-18 LAB
APPEARANCE: CLEAR
BACTERIA: NEGATIVE
BILIRUBIN URINE: NEGATIVE
BLOOD URINE: NEGATIVE
COLOR: NORMAL
CREAT SPEC-SCNC: 59 MG/DL
GLUCOSE QUALITATIVE U: NEGATIVE
HYALINE CASTS: 0 /LPF
KETONES URINE: NEGATIVE
LEUKOCYTE ESTERASE URINE: NEGATIVE
MICROALBUMIN 24H UR DL<=1MG/L-MCNC: 5.8 MG/DL
MICROALBUMIN/CREAT 24H UR-RTO: 98 MG/G
MICROSCOPIC-UA: NORMAL
NITRITE URINE: NEGATIVE
PH URINE: 6
PROTEIN URINE: NORMAL
RED BLOOD CELLS URINE: 2 /HPF
SPECIFIC GRAVITY URINE: 1.02
SQUAMOUS EPITHELIAL CELLS: 0 /HPF
UROBILINOGEN URINE: NORMAL
WHITE BLOOD CELLS URINE: 0 /HPF

## 2020-01-20 ENCOUNTER — FORM ENCOUNTER (OUTPATIENT)
Age: 70
End: 2020-01-20

## 2020-01-21 ENCOUNTER — APPOINTMENT (OUTPATIENT)
Dept: RADIOLOGY | Facility: CLINIC | Age: 70
End: 2020-01-21
Payer: COMMERCIAL

## 2020-01-21 ENCOUNTER — APPOINTMENT (OUTPATIENT)
Dept: INTERNAL MEDICINE | Facility: CLINIC | Age: 70
End: 2020-01-21
Payer: COMMERCIAL

## 2020-01-21 ENCOUNTER — OUTPATIENT (OUTPATIENT)
Dept: OUTPATIENT SERVICES | Facility: HOSPITAL | Age: 70
LOS: 1 days | End: 2020-01-21
Payer: COMMERCIAL

## 2020-01-21 VITALS
DIASTOLIC BLOOD PRESSURE: 88 MMHG | SYSTOLIC BLOOD PRESSURE: 135 MMHG | TEMPERATURE: 97.4 F | HEART RATE: 93 BPM | OXYGEN SATURATION: 98 %

## 2020-01-21 VITALS — HEIGHT: 66 IN | BODY MASS INDEX: 25.07 KG/M2 | WEIGHT: 156 LBS

## 2020-01-21 DIAGNOSIS — Z00.8 ENCOUNTER FOR OTHER GENERAL EXAMINATION: ICD-10-CM

## 2020-01-21 DIAGNOSIS — R05 COUGH: ICD-10-CM

## 2020-01-21 PROCEDURE — 71046 X-RAY EXAM CHEST 2 VIEWS: CPT

## 2020-01-21 PROCEDURE — 71046 X-RAY EXAM CHEST 2 VIEWS: CPT | Mod: 26

## 2020-01-21 PROCEDURE — 99214 OFFICE O/P EST MOD 30 MIN: CPT

## 2020-01-21 NOTE — PHYSICAL EXAM
[No Acute Distress] : no acute distress [Well Nourished] : well nourished [No JVD] : no jugular venous distention [No Lymphadenopathy] : no lymphadenopathy [No Respiratory Distress] : no respiratory distress  [No Accessory Muscle Use] : no accessory muscle use [Normal Rate] : normal rate  [Regular Rhythm] : with a regular rhythm [No Edema] : there was no peripheral edema [No Carotid Bruits] : no carotid bruits [Soft] : abdomen soft [No HSM] : no HSM

## 2020-01-21 NOTE — HISTORY OF PRESENT ILLNESS
[FreeTextEntry1] : cough and body aches [de-identified] : cough, dry 3 weeks\par no fever\par diabetic\par body aches for months\par on lipitor\par

## 2020-01-21 NOTE — REVIEW OF SYSTEMS
[Cough] : cough [Fever] : no fever [Night Sweats] : no night sweats [Earache] : no earache [Nasal Discharge] : no nasal discharge [Chest Pain] : no chest pain [Orthopena] : no orthopnea [Shortness Of Breath] : no shortness of breath [Wheezing] : no wheezing

## 2020-01-21 NOTE — PLAN
[FreeTextEntry1] : cough\par chest x ray\par no abx\par breo for 4 weeks\par \par muscle aches\par stop lipitor\par if better crestor 10 every other day

## 2020-02-03 ENCOUNTER — APPOINTMENT (OUTPATIENT)
Dept: CARDIOLOGY | Facility: CLINIC | Age: 70
End: 2020-02-03

## 2020-02-19 ENCOUNTER — APPOINTMENT (OUTPATIENT)
Dept: INTERNAL MEDICINE | Facility: CLINIC | Age: 70
End: 2020-02-19
Payer: COMMERCIAL

## 2020-02-19 VITALS
TEMPERATURE: 97.5 F | BODY MASS INDEX: 25.39 KG/M2 | WEIGHT: 158 LBS | HEART RATE: 89 BPM | DIASTOLIC BLOOD PRESSURE: 70 MMHG | SYSTOLIC BLOOD PRESSURE: 130 MMHG | OXYGEN SATURATION: 96 % | HEIGHT: 66 IN

## 2020-02-19 PROCEDURE — 36415 COLL VENOUS BLD VENIPUNCTURE: CPT

## 2020-02-19 PROCEDURE — 99214 OFFICE O/P EST MOD 30 MIN: CPT | Mod: 25

## 2020-02-19 NOTE — PHYSICAL EXAM
[Well Nourished] : well nourished [No Acute Distress] : no acute distress [Normal Outer Ear/Nose] : the outer ears and nose were normal in appearance [No JVD] : no jugular venous distention [Normal Oropharynx] : the oropharynx was normal [No Lymphadenopathy] : no lymphadenopathy [Normal Rate] : normal rate  [No Respiratory Distress] : no respiratory distress  [No Accessory Muscle Use] : no accessory muscle use

## 2020-02-19 NOTE — HISTORY OF PRESENT ILLNESS
[de-identified] : off lipitor 75 % better\par cough gone finished breo\par still some aches and pains [FreeTextEntry1] : cough and lipids

## 2020-02-19 NOTE — REVIEW OF SYSTEMS
[Fever] : no fever [Nasal Discharge] : no nasal discharge [Earache] : no earache [Night Sweats] : no night sweats [Orthopena] : no orthopnea [Chest Pain] : no chest pain [Wheezing] : no wheezing [Shortness Of Breath] : no shortness of breath [Abdominal Pain] : no abdominal pain [Vomiting] : no vomiting

## 2020-02-20 LAB
ANION GAP SERPL CALC-SCNC: 16 MMOL/L
BUN SERPL-MCNC: 21 MG/DL
CALCIUM SERPL-MCNC: 9.4 MG/DL
CHLORIDE SERPL-SCNC: 99 MMOL/L
CHOLEST SERPL-MCNC: 281 MG/DL
CHOLEST/HDLC SERPL: 5.2 RATIO
CO2 SERPL-SCNC: 26 MMOL/L
CREAT SERPL-MCNC: 0.99 MG/DL
ESTIMATED AVERAGE GLUCOSE: 137 MG/DL
GLUCOSE SERPL-MCNC: 137 MG/DL
HBA1C MFR BLD HPLC: 6.4 %
HDLC SERPL-MCNC: 54 MG/DL
LDLC SERPL CALC-MCNC: 165 MG/DL
POTASSIUM SERPL-SCNC: 5 MMOL/L
SODIUM SERPL-SCNC: 140 MMOL/L
TRIGL SERPL-MCNC: 311 MG/DL

## 2020-02-23 ENCOUNTER — NON-APPOINTMENT (OUTPATIENT)
Age: 70
End: 2020-02-23

## 2020-02-24 ENCOUNTER — APPOINTMENT (OUTPATIENT)
Dept: OPHTHALMOLOGY | Facility: CLINIC | Age: 70
End: 2020-02-24
Payer: COMMERCIAL

## 2020-02-24 PROCEDURE — 92014 COMPRE OPH EXAM EST PT 1/>: CPT

## 2020-02-24 PROCEDURE — 92202 OPSCPY EXTND ON/MAC DRAW: CPT

## 2020-02-24 PROCEDURE — 92133 CPTRZD OPH DX IMG PST SGM ON: CPT

## 2020-02-29 ENCOUNTER — TRANSCRIPTION ENCOUNTER (OUTPATIENT)
Age: 70
End: 2020-02-29

## 2020-05-03 LAB
ALBUMIN SERPL ELPH-MCNC: 4.3 G/DL
ALP BLD-CCNC: 71 U/L
ALT SERPL-CCNC: 13 U/L
ANION GAP SERPL CALC-SCNC: 13 MMOL/L
AST SERPL-CCNC: 14 U/L
BASOPHILS # BLD AUTO: 0.09 K/UL
BASOPHILS NFR BLD AUTO: 0.7 %
BILIRUB SERPL-MCNC: 0.5 MG/DL
BUN SERPL-MCNC: 25 MG/DL
CALCIUM SERPL-MCNC: 9.2 MG/DL
CHLORIDE SERPL-SCNC: 104 MMOL/L
CHOLEST SERPL-MCNC: 162 MG/DL
CHOLEST/HDLC SERPL: 3.3 RATIO
CO2 SERPL-SCNC: 24 MMOL/L
CREAT SERPL-MCNC: 0.95 MG/DL
EOSINOPHIL # BLD AUTO: 0.44 K/UL
EOSINOPHIL NFR BLD AUTO: 3.6 %
ESTIMATED AVERAGE GLUCOSE: 137 MG/DL
GLUCOSE SERPL-MCNC: 147 MG/DL
HBA1C MFR BLD HPLC: 6.4 %
HCT VFR BLD CALC: 49.3 %
HDLC SERPL-MCNC: 49 MG/DL
HGB BLD-MCNC: 15.5 G/DL
IMM GRANULOCYTES NFR BLD AUTO: 0.7 %
LDLC SERPL CALC-MCNC: 78 MG/DL
LYMPHOCYTES # BLD AUTO: 1.17 K/UL
LYMPHOCYTES NFR BLD AUTO: 9.6 %
MAN DIFF?: NORMAL
MCHC RBC-ENTMCNC: 28.6 PG
MCHC RBC-ENTMCNC: 31.4 GM/DL
MCV RBC AUTO: 91 FL
MONOCYTES # BLD AUTO: 1.24 K/UL
MONOCYTES NFR BLD AUTO: 10.2 %
NEUTROPHILS # BLD AUTO: 9.13 K/UL
NEUTROPHILS NFR BLD AUTO: 75.2 %
PLATELET # BLD AUTO: 212 K/UL
POTASSIUM SERPL-SCNC: 4.2 MMOL/L
PROT SERPL-MCNC: 6.6 G/DL
RBC # BLD: 5.42 M/UL
RBC # FLD: 13.8 %
SODIUM SERPL-SCNC: 141 MMOL/L
TRIGL SERPL-MCNC: 174 MG/DL
WBC # FLD AUTO: 12.16 K/UL

## 2020-05-11 ENCOUNTER — RX RENEWAL (OUTPATIENT)
Age: 70
End: 2020-05-11

## 2020-06-10 ENCOUNTER — APPOINTMENT (OUTPATIENT)
Dept: INTERNAL MEDICINE | Facility: CLINIC | Age: 70
End: 2020-06-10
Payer: COMMERCIAL

## 2020-06-10 VITALS
SYSTOLIC BLOOD PRESSURE: 133 MMHG | OXYGEN SATURATION: 95 % | DIASTOLIC BLOOD PRESSURE: 97 MMHG | HEART RATE: 90 BPM | TEMPERATURE: 98 F | BODY MASS INDEX: 24.86 KG/M2 | WEIGHT: 154 LBS | RESPIRATION RATE: 16 BRPM

## 2020-06-10 DIAGNOSIS — G72.9 MYOPATHY, UNSPECIFIED: ICD-10-CM

## 2020-06-10 LAB
BILIRUB UR QL STRIP: NEGATIVE
GLUCOSE UR-MCNC: NEGATIVE
HCG UR QL: 0.2 EU/DL
HGB UR QL STRIP.AUTO: NEGATIVE
KETONES UR-MCNC: NEGATIVE
LEUKOCYTE ESTERASE UR QL STRIP: NEGATIVE
NITRITE UR QL STRIP: NEGATIVE
PH UR STRIP: 5
PROT UR STRIP-MCNC: 100
SP GR UR STRIP: 1.02

## 2020-06-10 PROCEDURE — 36415 COLL VENOUS BLD VENIPUNCTURE: CPT

## 2020-06-10 PROCEDURE — 99214 OFFICE O/P EST MOD 30 MIN: CPT | Mod: 25

## 2020-06-10 RX ORDER — CHOLECALCIFEROL (VITAMIN D3) 50 MCG
50 MCG TABLET ORAL
Qty: 30 | Refills: 2 | Status: DISCONTINUED | COMMUNITY
Start: 2017-12-04 | End: 2020-06-10

## 2020-06-10 RX ORDER — ADHESIVE TAPE 3"X 2.3 YD
50 MCG TAPE, NON-MEDICATED TOPICAL
Qty: 90 | Refills: 0 | Status: DISCONTINUED | COMMUNITY
Start: 2017-06-01 | End: 2020-06-10

## 2020-06-10 RX ORDER — BENZONATATE 200 MG/1
200 CAPSULE ORAL 3 TIMES DAILY
Qty: 30 | Refills: 2 | Status: DISCONTINUED | COMMUNITY
Start: 2020-01-22 | End: 2020-06-10

## 2020-06-10 RX ORDER — ICOSAPENT ETHYL 1000 MG/1
1 CAPSULE ORAL
Qty: 360 | Refills: 3 | Status: DISCONTINUED | COMMUNITY
Start: 2019-12-03 | End: 2020-06-10

## 2020-06-10 RX ORDER — FLUTICASONE FUROATE AND VILANTEROL TRIFENATATE 200; 25 UG/1; UG/1
200-25 POWDER RESPIRATORY (INHALATION) DAILY
Qty: 1 | Refills: 3 | Status: DISCONTINUED | COMMUNITY
Start: 2020-01-21 | End: 2020-06-10

## 2020-06-10 NOTE — HISTORY OF PRESENT ILLNESS
[de-identified] : Weakness\par loss of muscle mass\par gait\par numbness\par off of statin 2 week\par diabetic\par motor skills down\par thought worse\par

## 2020-06-10 NOTE — REVIEW OF SYSTEMS
[Joint Pain] : joint pain [Joint Stiffness] : joint stiffness [Muscle Pain] : muscle pain [Negative] : Integumentary

## 2020-06-10 NOTE — PHYSICAL EXAM
[Normal] : soft, non-tender, non-distended, no masses palpated, no HSM and normal bowel sounds [de-identified] : loss of msucle mass legs

## 2020-06-11 LAB
24R-OH-CALCIDIOL SERPL-MCNC: 29.5 PG/ML
25(OH)D3 SERPL-MCNC: 21.1 NG/ML
ALBUMIN SERPL ELPH-MCNC: 4.8 G/DL
ALDOLASE SERPL-CCNC: 4.7 U/L
ALP BLD-CCNC: 72 U/L
ALT SERPL-CCNC: 14 U/L
ANA SER IF-ACNC: NEGATIVE
ANION GAP SERPL CALC-SCNC: 19 MMOL/L
AST SERPL-CCNC: 14 U/L
BASOPHILS # BLD AUTO: 0.1 K/UL
BASOPHILS NFR BLD AUTO: 0.9 %
BILIRUB SERPL-MCNC: 0.6 MG/DL
BUN SERPL-MCNC: 25 MG/DL
CALCIUM SERPL-MCNC: 10.1 MG/DL
CHLORIDE SERPL-SCNC: 101 MMOL/L
CHOLEST SERPL-MCNC: 269 MG/DL
CHOLEST/HDLC SERPL: 5.4 RATIO
CK SERPL-CCNC: 25 U/L
CO2 SERPL-SCNC: 21 MMOL/L
CREAT SERPL-MCNC: 0.97 MG/DL
CRP SERPL-MCNC: 0.56 MG/DL
EOSINOPHIL # BLD AUTO: 0.46 K/UL
EOSINOPHIL NFR BLD AUTO: 4 %
ERYTHROCYTE [SEDIMENTATION RATE] IN BLOOD BY WESTERGREN METHOD: 34 MM/HR
ESTIMATED AVERAGE GLUCOSE: 140 MG/DL
FOLATE SERPL-MCNC: 10.9 NG/ML
GLUCOSE SERPL-MCNC: 160 MG/DL
HBA1C MFR BLD HPLC: 6.5 %
HCT VFR BLD CALC: 51.9 %
HDLC SERPL-MCNC: 49 MG/DL
HGB BLD-MCNC: 16.7 G/DL
IMM GRANULOCYTES NFR BLD AUTO: 1 %
LDLC SERPL CALC-MCNC: 153 MG/DL
LYMPHOCYTES # BLD AUTO: 1.41 K/UL
LYMPHOCYTES NFR BLD AUTO: 12.2 %
MAGNESIUM SERPL-MCNC: 1.5 MG/DL
MAN DIFF?: NORMAL
MCHC RBC-ENTMCNC: 28 PG
MCHC RBC-ENTMCNC: 32.2 GM/DL
MCV RBC AUTO: 86.9 FL
MONOCYTES # BLD AUTO: 1.11 K/UL
MONOCYTES NFR BLD AUTO: 9.6 %
NEUTROPHILS # BLD AUTO: 8.33 K/UL
NEUTROPHILS NFR BLD AUTO: 72.3 %
PHOSPHATE SERPL-MCNC: 3.6 MG/DL
PLATELET # BLD AUTO: 235 K/UL
POTASSIUM SERPL-SCNC: 4.4 MMOL/L
PROT SERPL-MCNC: 7.1 G/DL
RBC # BLD: 5.97 M/UL
RBC # FLD: 14 %
RHEUMATOID FACT SER QL: 11 IU/ML
SARS-COV-2 IGG SERPL IA-ACNC: <0.1 INDEX
SARS-COV-2 IGG SERPL QL IA: NEGATIVE
SODIUM SERPL-SCNC: 141 MMOL/L
T4 FREE SERPL-MCNC: 1.2 NG/DL
TRIGL SERPL-MCNC: 329 MG/DL
TSH SERPL-ACNC: 1.52 UIU/ML
VIT B12 SERPL-MCNC: 817 PG/ML
WBC # FLD AUTO: 11.53 K/UL

## 2020-06-12 LAB
ALBUMIN MFR SERPL ELPH: 62.3 %
ALBUMIN SERPL-MCNC: 4.4 G/DL
ALBUMIN/GLOB SERPL: 1.6 RATIO
ALPHA1 GLOB MFR SERPL ELPH: 3.3 %
ALPHA1 GLOB SERPL ELPH-MCNC: 0.2 G/DL
ALPHA2 GLOB MFR SERPL ELPH: 10.7 %
ALPHA2 GLOB SERPL ELPH-MCNC: 0.8 G/DL
B-GLOBULIN MFR SERPL ELPH: 9.8 %
B-GLOBULIN SERPL ELPH-MCNC: 0.7 G/DL
DEPRECATED KAPPA LC FREE/LAMBDA SER: 1.34 RATIO
GAMMA GLOB FLD ELPH-MCNC: 1 G/DL
GAMMA GLOB MFR SERPL ELPH: 13.9 %
IGA SER QL IEP: 43 MG/DL
IGG SER QL IEP: 1038 MG/DL
IGM SER QL IEP: 51 MG/DL
INTERPRETATION SERPL IEP-IMP: NORMAL
KAPPA LC CSF-MCNC: 1.19 MG/DL
KAPPA LC SERPL-MCNC: 1.6 MG/DL
M PROTEIN MFR SERPL ELPH: 3.9 %
M PROTEIN SPEC IFE-MCNC: NORMAL
MONOCLON BAND OBS SERPL: 0.3 G/DL
PROT SERPL-MCNC: 7.1 G/DL
PROT SERPL-MCNC: 7.1 G/DL

## 2020-06-13 LAB — VIT B6 SERPL-MCNC: 10.5 UG/L

## 2020-06-29 ENCOUNTER — NON-APPOINTMENT (OUTPATIENT)
Age: 70
End: 2020-06-29

## 2020-06-29 ENCOUNTER — APPOINTMENT (OUTPATIENT)
Dept: CARDIOLOGY | Facility: CLINIC | Age: 70
End: 2020-06-29
Payer: COMMERCIAL

## 2020-06-29 VITALS
HEART RATE: 102 BPM | DIASTOLIC BLOOD PRESSURE: 97 MMHG | HEIGHT: 67 IN | BODY MASS INDEX: 24.64 KG/M2 | WEIGHT: 157 LBS | SYSTOLIC BLOOD PRESSURE: 151 MMHG | OXYGEN SATURATION: 97 %

## 2020-06-29 VITALS — SYSTOLIC BLOOD PRESSURE: 130 MMHG | DIASTOLIC BLOOD PRESSURE: 70 MMHG

## 2020-06-29 DIAGNOSIS — R29.898 OTHER SYMPTOMS AND SIGNS INVOLVING THE MUSCULOSKELETAL SYSTEM: ICD-10-CM

## 2020-06-29 PROCEDURE — 93000 ELECTROCARDIOGRAM COMPLETE: CPT

## 2020-06-29 PROCEDURE — 99214 OFFICE O/P EST MOD 30 MIN: CPT

## 2020-06-29 NOTE — PHYSICAL EXAM
[Normal Appearance] : normal appearance [Well Groomed] : well groomed [General Appearance - In No Acute Distress] : no acute distress [Normal Conjunctiva] : the conjunctiva exhibited no abnormalities [Eyelids - No Xanthelasma] : the eyelids demonstrated no xanthelasmas [No Oral Pallor] : no oral pallor [No Oral Cyanosis] : no oral cyanosis [Normal Jugular Venous V Waves Present] : normal jugular venous V waves present [Respiration, Rhythm And Depth] : normal respiratory rhythm and effort [Exaggerated Use Of Accessory Muscles For Inspiration] : no accessory muscle use [Bowel Sounds] : normal bowel sounds [Auscultation Breath Sounds / Voice Sounds] : lungs were clear to auscultation bilaterally [Abdomen Tenderness] : non-tender [Abdomen Soft] : soft [Abnormal Walk] : normal gait [Nail Clubbing] : no clubbing of the fingernails [Gait - Sufficient For Exercise Testing] : the gait was sufficient for exercise testing [Cyanosis, Localized] : no localized cyanosis [Petechial Hemorrhages (___cm)] : no petechial hemorrhages [Skin Color & Pigmentation] : normal skin color and pigmentation [Oriented To Time, Place, And Person] : oriented to person, place, and time [] : no rash [Mood] : the mood was normal [Affect] : the affect was normal [Not Palpable] : not palpable [No Anxiety] : not feeling anxious [No Precordial Heave] : no precordial heave was noted [Normal Rate] : normal [Normal S1] : normal S1 [Irregularly Irregular] : irregularly irregular [Normal S2] : normal S2 [No Gallop] : no gallop heard [No Murmur] : no murmurs heard [2+] : left 2+ [No Abnormalities] : the abdominal aorta was not enlarged and no bruit was heard [No Pitting Edema] : no pitting edema present [FreeTextEntry1] : dry MM [Right Carotid Bruit] : no bruit heard over the right carotid [Bruit] : no bruit heard [Left Carotid Bruit] : no bruit heard over the left carotid

## 2020-06-29 NOTE — HISTORY OF PRESENT ILLNESS
[FreeTextEntry1] : 69 year old man with a history of permanent atrial fibrillation, HTN, HLD, allergic conjunctivitis who is here for a followup.  \par \par Since his last visit he had a signficnat weakness in his lower extremity starting in the middle of December. he stopped his statin but his weakness has still not resolved. He is now undergoing an extensive neurological testing. Reportedly his EMG was normal. He is pending a hematology evaluation for an M spike. \par He has been more sedentary. His exercising has been more limited given his lower extremity weakness. He denies any claudication. \par  He has been having mild dyspnea on exertion. He denies palpitations, dyspnea, PND, orthopnea, lower extremity edema, LOC. He has been taking his Eliquis as prescribed without any melena, excessive bleeding, bruising.\par He has been trying to stay better hydrated. \par He is compliant with his medications.   \par

## 2020-06-29 NOTE — REASON FOR VISIT
[Atrial Fibrillation] : atrial fibrillation [Anticoagulation] : anticoagulation [Follow-Up - Clinic] : a clinic follow-up of [Medication Management] : Medication management [Hypertension] : hypertension [Hyperlipidemia] : hyperlipidemia

## 2020-07-02 ENCOUNTER — TRANSCRIPTION ENCOUNTER (OUTPATIENT)
Age: 70
End: 2020-07-02

## 2020-07-02 ENCOUNTER — APPOINTMENT (OUTPATIENT)
Dept: CARDIOLOGY | Facility: CLINIC | Age: 70
End: 2020-07-02
Payer: COMMERCIAL

## 2020-07-02 LAB — MAGNESIUM SERPL-MCNC: 1.9 MG/DL

## 2020-07-02 PROCEDURE — 93306 TTE W/DOPPLER COMPLETE: CPT

## 2020-07-02 PROCEDURE — 93925 LOWER EXTREMITY STUDY: CPT

## 2020-07-06 LAB — HISTONE AB SER QL: 0.5 UNITS

## 2020-07-09 LAB

## 2020-07-28 ENCOUNTER — APPOINTMENT (OUTPATIENT)
Dept: INFECTIOUS DISEASE | Facility: CLINIC | Age: 70
End: 2020-07-28
Payer: COMMERCIAL

## 2020-07-28 PROCEDURE — 99443: CPT

## 2020-07-28 NOTE — ASSESSMENT
[FreeTextEntry1] : Extremity weakness x 6 month duration of unclear etiology in 71 yo man with diabetes, hypercholesterolemia, chronic leucocytosis.\par Doubt that his symptoms are related to Lyme Disease.  Though he has some risk of exposure related to outdoor activities, his Western blot test is negative (one IgG band).\par Will check Lyme C 6 (igM and IgG) to complete serologic assessment.\par Would pursue other possible causes- advised f/u with hematologist/oncologist and neurologist.\par Mr. Jarrell will call in one week for results of Lyme RAYA.\par

## 2020-07-28 NOTE — HISTORY OF PRESENT ILLNESS
[Home] : at home, [unfilled] , at the time of the visit. [Medical Office: ([facility])___] : at the medical office located in  [Spouse] : spouse [Verbal consent obtained from patient] : the patient, [unfilled] [FreeTextEntry1] : Mr. Jarrell is a 69 yo man with diabetes, a fib, chronic leucocytosis, hypercholesterolemia who noticed weakness in his legs and "soreness" in Dec 2019.  He is usually very active- plays softball including indoors during winter and plays tennis 5x/week - in December he noticed difficult standing after catching a ball playing softball; couldn't lift things. Initially attributed to statins and felt some relief after holding medication but then felt additional right hand "tingling" in addition to tingling and numbness in right leg then left leg from knees to toes.\par He underwent evaluation by neurologist and followed with hematologist (sees 2 x /yr) and cardiologist.\par Lyme western blot ordered. \par Mr. Jarrell does not recall tick bite or EM type rash. Spends time outdoors in [place] playing softball. No camping, hiking. travels to [place] yearly.\par

## 2020-07-28 NOTE — REVIEW OF SYSTEMS
[As Noted in HPI] : as noted in HPI [Limb Weakness] : limb weakness [Negative] : Psychiatric [Fever] : no fever [Chills] : no chills [Joint Pain] : no joint pain

## 2020-07-29 ENCOUNTER — LABORATORY RESULT (OUTPATIENT)
Age: 70
End: 2020-07-29

## 2020-08-02 ENCOUNTER — RX RENEWAL (OUTPATIENT)
Age: 70
End: 2020-08-02

## 2020-08-04 LAB — B BURGDOR IGG+IGM SER QL IB: NORMAL

## 2020-08-07 ENCOUNTER — RX RENEWAL (OUTPATIENT)
Age: 70
End: 2020-08-07

## 2020-08-17 ENCOUNTER — APPOINTMENT (OUTPATIENT)
Dept: OPHTHALMOLOGY | Facility: CLINIC | Age: 70
End: 2020-08-17

## 2020-09-14 ENCOUNTER — RX RENEWAL (OUTPATIENT)
Age: 70
End: 2020-09-14

## 2020-10-07 ENCOUNTER — OUTPATIENT (OUTPATIENT)
Dept: OUTPATIENT SERVICES | Facility: HOSPITAL | Age: 70
LOS: 1 days | End: 2020-10-07
Payer: COMMERCIAL

## 2020-10-07 ENCOUNTER — APPOINTMENT (OUTPATIENT)
Dept: RADIOLOGY | Facility: HOSPITAL | Age: 70
End: 2020-10-07
Payer: COMMERCIAL

## 2020-10-07 ENCOUNTER — RESULT REVIEW (OUTPATIENT)
Age: 70
End: 2020-10-07

## 2020-10-07 DIAGNOSIS — M79.606 PAIN IN LEG, UNSPECIFIED: ICD-10-CM

## 2020-10-07 DIAGNOSIS — Z00.00 ENCOUNTER FOR GENERAL ADULT MEDICAL EXAMINATION WITHOUT ABNORMAL FINDINGS: ICD-10-CM

## 2020-10-07 DIAGNOSIS — R20.2 PARESTHESIA OF SKIN: ICD-10-CM

## 2020-10-07 LAB
% ALBUMIN: 64.2 % — SIGNIFICANT CHANGE UP
% ALPHA 1: 3.4 % — SIGNIFICANT CHANGE UP
% ALPHA 2: 10.4 % — SIGNIFICANT CHANGE UP
% BETA: 9.8 % — SIGNIFICANT CHANGE UP
% GAMMA: 12.2 % — SIGNIFICANT CHANGE UP
% M SPIKE: 1.9 % — SIGNIFICANT CHANGE UP
ALBUMIN SERPL ELPH-MCNC: 4.2 G/DL — SIGNIFICANT CHANGE UP (ref 3.6–5.5)
ALBUMIN/GLOB SERPL ELPH: 1.8 RATIO — SIGNIFICANT CHANGE UP
ALPHA1 GLOB SERPL ELPH-MCNC: 0.2 G/DL — SIGNIFICANT CHANGE UP (ref 0.1–0.4)
ALPHA2 GLOB SERPL ELPH-MCNC: 0.7 G/DL — SIGNIFICANT CHANGE UP (ref 0.5–1)
APPEARANCE CSF: CLEAR — SIGNIFICANT CHANGE UP
B-GLOBULIN SERPL ELPH-MCNC: 0.6 G/DL — SIGNIFICANT CHANGE UP (ref 0.5–1)
COLOR CSF: SIGNIFICANT CHANGE UP
GAMMA GLOBULIN: 0.8 G/DL — SIGNIFICANT CHANGE UP (ref 0.6–1.6)
GLUCOSE CSF-MCNC: 96 MG/DL — HIGH (ref 40–70)
GRAM STN FLD: SIGNIFICANT CHANGE UP
IGA FLD-MCNC: 41 MG/DL — LOW (ref 84–499)
IGG FLD-MCNC: 867 MG/DL — SIGNIFICANT CHANGE UP (ref 610–1660)
IGM SERPL-MCNC: 37 MG/DL — SIGNIFICANT CHANGE UP (ref 35–242)
INTERPRETATION SERPL IFE-IMP: SIGNIFICANT CHANGE UP
KAPPA LC SER QL IFE: 1.47 MG/DL — SIGNIFICANT CHANGE UP (ref 0.33–1.94)
KAPPA/LAMBDA FREE LIGHT CHAIN RATIO, SERUM: 1.32 RATIO — SIGNIFICANT CHANGE UP (ref 0.26–1.65)
LAMBDA LC SER QL IFE: 1.11 MG/DL — SIGNIFICANT CHANGE UP (ref 0.57–2.63)
LYMPHOCYTES # CSF: 78 % — SIGNIFICANT CHANGE UP (ref 40–80)
M-SPIKE: 0.1 G/DL — HIGH (ref 0–0)
MONOS+MACROS NFR CSF: 22 % — SIGNIFICANT CHANGE UP (ref 15–45)
NEUTROPHILS # CSF: 0 % — SIGNIFICANT CHANGE UP (ref 0–6)
NIGHT BLUE STAIN TISS: SIGNIFICANT CHANGE UP
NRBC NFR CSF: 1 /UL — SIGNIFICANT CHANGE UP (ref 0–5)
PROT CSF-MCNC: 66 MG/DL — HIGH (ref 15–45)
PROT PATTERN SERPL ELPH-IMP: SIGNIFICANT CHANGE UP
PROT SERPL-MCNC: 6.6 G/DL — SIGNIFICANT CHANGE UP (ref 6–8.3)
PROT SERPL-MCNC: 6.6 G/DL — SIGNIFICANT CHANGE UP (ref 6–8.3)
RBC # CSF: 1 /UL — HIGH (ref 0–0)
SPECIMEN SOURCE: SIGNIFICANT CHANGE UP
SPECIMEN SOURCE: SIGNIFICANT CHANGE UP
TUBE TYPE: SIGNIFICANT CHANGE UP

## 2020-10-07 PROCEDURE — 82945 GLUCOSE OTHER FLUID: CPT

## 2020-10-07 PROCEDURE — 89051 BODY FLUID CELL COUNT: CPT

## 2020-10-07 PROCEDURE — 83916 OLIGOCLONAL BANDS: CPT

## 2020-10-07 PROCEDURE — 62328 DX LMBR SPI PNXR W/FLUOR/CT: CPT

## 2020-10-07 PROCEDURE — 84165 PROTEIN E-PHORESIS SERUM: CPT

## 2020-10-07 PROCEDURE — 88108 CYTOPATH CONCENTRATE TECH: CPT | Mod: 26

## 2020-10-07 PROCEDURE — 88108 CYTOPATH CONCENTRATE TECH: CPT

## 2020-10-07 PROCEDURE — 87070 CULTURE OTHR SPECIMN AEROBIC: CPT

## 2020-10-07 PROCEDURE — 86618 LYME DISEASE ANTIBODY: CPT

## 2020-10-07 PROCEDURE — 84157 ASSAY OF PROTEIN OTHER: CPT

## 2020-10-07 PROCEDURE — 87205 SMEAR GRAM STAIN: CPT

## 2020-10-07 PROCEDURE — 86592 SYPHILIS TEST NON-TREP QUAL: CPT

## 2020-10-07 PROCEDURE — 87116 MYCOBACTERIA CULTURE: CPT

## 2020-10-08 LAB
NON-GYNECOLOGICAL CYTOLOGY STUDY: SIGNIFICANT CHANGE UP
VDRL CSF-TITR: NEGATIVE — SIGNIFICANT CHANGE UP

## 2020-10-09 LAB
B BURGDOR C6 AB SER-ACNC: NEGATIVE — SIGNIFICANT CHANGE UP
B BURGDOR IGG+IGM SER-ACNC: 0.09 INDEX — SIGNIFICANT CHANGE UP (ref 0.01–0.89)
LYME C6 AB IGG/IGM EIA REFLEX WESTERN BL: SIGNIFICANT CHANGE UP

## 2020-10-10 LAB
CULTURE RESULTS: NO GROWTH — SIGNIFICANT CHANGE UP
SPECIMEN SOURCE: SIGNIFICANT CHANGE UP

## 2020-10-14 LAB — OLIGOCLONAL BANDS CSF ELPH-IMP: SIGNIFICANT CHANGE UP

## 2020-11-03 ENCOUNTER — APPOINTMENT (OUTPATIENT)
Dept: OPHTHALMOLOGY | Facility: CLINIC | Age: 70
End: 2020-11-03
Payer: COMMERCIAL

## 2020-11-03 ENCOUNTER — APPOINTMENT (OUTPATIENT)
Dept: INTERNAL MEDICINE | Facility: CLINIC | Age: 70
End: 2020-11-03
Payer: COMMERCIAL

## 2020-11-03 ENCOUNTER — NON-APPOINTMENT (OUTPATIENT)
Age: 70
End: 2020-11-03

## 2020-11-03 VITALS
WEIGHT: 162 LBS | HEIGHT: 67 IN | HEART RATE: 98 BPM | TEMPERATURE: 97.6 F | OXYGEN SATURATION: 97 % | BODY MASS INDEX: 25.43 KG/M2 | DIASTOLIC BLOOD PRESSURE: 89 MMHG | SYSTOLIC BLOOD PRESSURE: 136 MMHG

## 2020-11-03 PROCEDURE — 36415 COLL VENOUS BLD VENIPUNCTURE: CPT

## 2020-11-03 PROCEDURE — 90662 IIV NO PRSV INCREASED AG IM: CPT

## 2020-11-03 PROCEDURE — G0008: CPT

## 2020-11-03 PROCEDURE — G0439: CPT

## 2020-11-03 PROCEDURE — 92020 GONIOSCOPY: CPT

## 2020-11-03 PROCEDURE — 92133 CPTRZD OPH DX IMG PST SGM ON: CPT

## 2020-11-03 PROCEDURE — 99072 ADDL SUPL MATRL&STAF TM PHE: CPT

## 2020-11-03 PROCEDURE — 99214 OFFICE O/P EST MOD 30 MIN: CPT | Mod: 25

## 2020-11-03 PROCEDURE — 92014 COMPRE OPH EXAM EST PT 1/>: CPT

## 2020-11-03 NOTE — HISTORY OF PRESENT ILLNESS
[FreeTextEntry1] : Diabetes and neuropathy [de-identified] : now on gabapentum 2400 daily\par for neuropathy from diabetes\par off statin\par on vasecpa

## 2020-11-05 LAB
25(OH)D3 SERPL-MCNC: 21.7 NG/ML
ALBUMIN SERPL ELPH-MCNC: 4.6 G/DL
ALP BLD-CCNC: 75 U/L
ALT SERPL-CCNC: 17 U/L
ANION GAP SERPL CALC-SCNC: 14 MMOL/L
AST SERPL-CCNC: 12 U/L
BASOPHILS # BLD AUTO: 0.08 K/UL
BASOPHILS NFR BLD AUTO: 0.8 %
BILIRUB SERPL-MCNC: 0.6 MG/DL
BUN SERPL-MCNC: 18 MG/DL
CALCIUM SERPL-MCNC: 9.5 MG/DL
CHLORIDE SERPL-SCNC: 100 MMOL/L
CHOLEST SERPL-MCNC: 267 MG/DL
CO2 SERPL-SCNC: 26 MMOL/L
CREAT SERPL-MCNC: 0.92 MG/DL
EOSINOPHIL # BLD AUTO: 0.17 K/UL
EOSINOPHIL NFR BLD AUTO: 1.6 %
ESTIMATED AVERAGE GLUCOSE: 151 MG/DL
FOLATE SERPL-MCNC: 12.7 NG/ML
GLUCOSE SERPL-MCNC: 157 MG/DL
HBA1C MFR BLD HPLC: 6.9 %
HCT VFR BLD CALC: 45.8 %
HDLC SERPL-MCNC: 47 MG/DL
HGB BLD-MCNC: 15.2 G/DL
IMM GRANULOCYTES NFR BLD AUTO: 1.2 %
LDLC SERPL CALC-MCNC: 161 MG/DL
LYMPHOCYTES # BLD AUTO: 1.23 K/UL
LYMPHOCYTES NFR BLD AUTO: 11.8 %
MAN DIFF?: NORMAL
MCHC RBC-ENTMCNC: 29.2 PG
MCHC RBC-ENTMCNC: 33.2 GM/DL
MCV RBC AUTO: 88.1 FL
MONOCYTES # BLD AUTO: 1.07 K/UL
MONOCYTES NFR BLD AUTO: 10.3 %
NEUTROPHILS # BLD AUTO: 7.76 K/UL
NEUTROPHILS NFR BLD AUTO: 74.3 %
NONHDLC SERPL-MCNC: 220 MG/DL
PLATELET # BLD AUTO: 167 K/UL
POTASSIUM SERPL-SCNC: 4.5 MMOL/L
PROT SERPL-MCNC: 6.7 G/DL
RBC # BLD: 5.2 M/UL
RBC # FLD: 13.8 %
SODIUM SERPL-SCNC: 139 MMOL/L
T4 FREE SERPL-MCNC: 1.2 NG/DL
TRIGL SERPL-MCNC: 292 MG/DL
TSH SERPL-ACNC: 0.84 UIU/ML
VIT B12 SERPL-MCNC: 562 PG/ML
WBC # FLD AUTO: 10.43 K/UL

## 2020-11-28 LAB
CULTURE RESULTS: SIGNIFICANT CHANGE UP
SPECIMEN SOURCE: SIGNIFICANT CHANGE UP

## 2020-11-30 ENCOUNTER — APPOINTMENT (OUTPATIENT)
Dept: NEPHROLOGY | Facility: CLINIC | Age: 70
End: 2020-11-30
Payer: COMMERCIAL

## 2020-11-30 VITALS
WEIGHT: 156.53 LBS | SYSTOLIC BLOOD PRESSURE: 132 MMHG | OXYGEN SATURATION: 98 % | BODY MASS INDEX: 24.52 KG/M2 | DIASTOLIC BLOOD PRESSURE: 99 MMHG | HEART RATE: 106 BPM

## 2020-11-30 VITALS — SYSTOLIC BLOOD PRESSURE: 126 MMHG | DIASTOLIC BLOOD PRESSURE: 70 MMHG

## 2020-11-30 DIAGNOSIS — R80.9 PROTEINURIA, UNSPECIFIED: ICD-10-CM

## 2020-11-30 PROCEDURE — 99213 OFFICE O/P EST LOW 20 MIN: CPT

## 2020-11-30 PROCEDURE — 99072 ADDL SUPL MATRL&STAF TM PHE: CPT

## 2020-11-30 NOTE — ASSESSMENT
[FreeTextEntry1] : 71 yo male with DM, HTN and proteinuria\par Preserved renal function.  Variability in creatinine related to hydration status while on a diuretic and ARB.\par Proteinuria likely secondary to Dm and HTN. Monitor and trend\par Dm: advised the importance of DM control\par He is on ARB therapy and now on HCTZ and epleronone\par HTN: BP at goal\par Hep C and B neg \par Follow neuro and possibly decrease gabapentin if it is making him more unsteady?\par 1 year follow-up

## 2020-11-30 NOTE — PHYSICAL EXAM
[General Appearance - Alert] : alert [General Appearance - In No Acute Distress] : in no acute distress [General Appearance - Well Nourished] : well nourished [Sclera] : the sclera and conjunctiva were normal [Outer Ear] : the ears and nose were normal in appearance [Neck Appearance] : the appearance of the neck was normal [Jugular Venous Distention Increased] : there was no jugular-venous distention [Auscultation Breath Sounds / Voice Sounds] : lungs were clear to auscultation bilaterally [Murmurs] : no murmurs [Heart Sounds Pericardial Friction Rub] : no pericardial rub [Edema] : there was no peripheral edema [Bowel Sounds] : normal bowel sounds [Abdomen Soft] : soft [Abdomen Tenderness] : non-tender [No CVA Tenderness] : no ~M costovertebral angle tenderness [Involuntary Movements] : no involuntary movements were seen [FreeTextEntry1] : right hand edema post fall but able to make a fist [] : no rash [No Focal Deficits] : no focal deficits [Oriented To Time, Place, And Person] : oriented to person, place, and time [Affect] : the affect was normal

## 2020-11-30 NOTE — REVIEW OF SYSTEMS
Report called into [facility]. [phone] to charge nurse. Unknown  time. Wife at bedside assisting patient with lunch.    [Nocturia] : nocturia [Skin Lesions] : skin lesion [Difficulty Walking] : difficulty walking [Negative] : Heme/Lymph [FreeTextEntry5] : afib [de-identified] : sees derm [de-identified] : neuropathy

## 2020-11-30 NOTE — HISTORY OF PRESENT ILLNESS
[FreeTextEntry1] : 71 yo male with long standing DM, HTN here for follow-up of proteinuria\par No NSAID\par Renal sono was unremarkable except for cysts\par He had developed neuropathy of unclear etiology and was getting evaluated by Dr Lopez\par Started on gabapentin at increasing doses. \par Recently fell and hit his right hand and left shoulder. He has been unsteady on his feet\par He had not seen anyone regarding his fall. But he will get PT\par He follows with hematology for his paraproteinemia\par

## 2020-12-01 ENCOUNTER — NON-APPOINTMENT (OUTPATIENT)
Age: 70
End: 2020-12-01

## 2020-12-01 ENCOUNTER — APPOINTMENT (OUTPATIENT)
Dept: CARDIOLOGY | Facility: CLINIC | Age: 70
End: 2020-12-01
Payer: COMMERCIAL

## 2020-12-01 VITALS
HEART RATE: 104 BPM | OXYGEN SATURATION: 96 % | DIASTOLIC BLOOD PRESSURE: 97 MMHG | SYSTOLIC BLOOD PRESSURE: 150 MMHG | HEIGHT: 67 IN | WEIGHT: 156 LBS | BODY MASS INDEX: 24.48 KG/M2

## 2020-12-01 VITALS — DIASTOLIC BLOOD PRESSURE: 70 MMHG | SYSTOLIC BLOOD PRESSURE: 122 MMHG

## 2020-12-01 DIAGNOSIS — R42 DIZZINESS AND GIDDINESS: ICD-10-CM

## 2020-12-01 LAB
APPEARANCE: CLEAR
BACTERIA: NEGATIVE
BILIRUBIN URINE: NEGATIVE
BLOOD URINE: NEGATIVE
COLOR: YELLOW
CREAT SPEC-SCNC: 144 MG/DL
GLUCOSE QUALITATIVE U: NEGATIVE
HYALINE CASTS: 4 /LPF
KETONES URINE: NEGATIVE
LEUKOCYTE ESTERASE URINE: NEGATIVE
MICROALBUMIN 24H UR DL<=1MG/L-MCNC: 71.9 MG/DL
MICROALBUMIN/CREAT 24H UR-RTO: 500 MG/G
MICROSCOPIC-UA: NORMAL
NITRITE URINE: NEGATIVE
PH URINE: 6
PROTEIN URINE: ABNORMAL
RED BLOOD CELLS URINE: 2 /HPF
SPECIFIC GRAVITY URINE: 1.03
SQUAMOUS EPITHELIAL CELLS: 1 /HPF
UROBILINOGEN URINE: NORMAL
WHITE BLOOD CELLS URINE: 1 /HPF

## 2020-12-01 PROCEDURE — 93000 ELECTROCARDIOGRAM COMPLETE: CPT

## 2020-12-01 PROCEDURE — 99215 OFFICE O/P EST HI 40 MIN: CPT

## 2020-12-01 PROCEDURE — 99072 ADDL SUPL MATRL&STAF TM PHE: CPT

## 2020-12-01 NOTE — CARDIOLOGY SUMMARY
[___] : [unfilled] [No Ischemia] : no Ischemia [___] : [unfilled] [None] : no pulmonary hypertension [Enlarged] : enlarged LA size

## 2020-12-01 NOTE — PHYSICAL EXAM
[Normal Appearance] : normal appearance [Well Groomed] : well groomed [General Appearance - In No Acute Distress] : no acute distress [Normal Conjunctiva] : the conjunctiva exhibited no abnormalities [Eyelids - No Xanthelasma] : the eyelids demonstrated no xanthelasmas [No Oral Pallor] : no oral pallor [No Oral Cyanosis] : no oral cyanosis [Normal Jugular Venous V Waves Present] : normal jugular venous V waves present [Respiration, Rhythm And Depth] : normal respiratory rhythm and effort [Exaggerated Use Of Accessory Muscles For Inspiration] : no accessory muscle use [Auscultation Breath Sounds / Voice Sounds] : lungs were clear to auscultation bilaterally [Bowel Sounds] : normal bowel sounds [Abdomen Soft] : soft [Abdomen Tenderness] : non-tender [Abnormal Walk] : normal gait [Gait - Sufficient For Exercise Testing] : the gait was sufficient for exercise testing [Nail Clubbing] : no clubbing of the fingernails [Cyanosis, Localized] : no localized cyanosis [Petechial Hemorrhages (___cm)] : no petechial hemorrhages [Skin Color & Pigmentation] : normal skin color and pigmentation [] : no rash [Oriented To Time, Place, And Person] : oriented to person, place, and time [Affect] : the affect was normal [Mood] : the mood was normal [No Anxiety] : not feeling anxious [Not Palpable] : not palpable [No Precordial Heave] : no precordial heave was noted [Normal Rate] : normal [Irregularly Irregular] : irregularly irregular [Normal S1] : normal S1 [Normal S2] : normal S2 [No Gallop] : no gallop heard [No Murmur] : no murmurs heard [2+] : left 2+ [No Abnormalities] : the abdominal aorta was not enlarged and no bruit was heard [No Pitting Edema] : no pitting edema present [FreeTextEntry1] : dry MM [Right Carotid Bruit] : no bruit heard over the right carotid [Left Carotid Bruit] : no bruit heard over the left carotid [Bruit] : no bruit heard

## 2020-12-01 NOTE — REVIEW OF SYSTEMS
[see HPI] : see HPI [Joint Pain] : joint pain [Negative] : Heme/Lymph [Shortness Of Breath] : no shortness of breath [Dyspnea on exertion] : not dyspnea during exertion [Chest  Pressure] : no chest pressure [Chest Pain] : no chest pain [Lower Ext Edema] : no extremity edema [Leg Claudication] : no intermittent leg claudication [Palpitations] : no palpitations [Dizziness] : dizziness

## 2020-12-01 NOTE — HISTORY OF PRESENT ILLNESS
[FreeTextEntry1] : 70 year old man with a history of permanent atrial fibrillation, HTN, HLD, allergic conjunctivitis who is here for a followup.  \par \par Since his last visit  he had a BM biospy that was normal. He was diagnosed with diabetic neuropathy and started on neurontin and cymbalta.  He still is having more balance issues. He recently had a mechanical fall down the stairs the day after Thanksgiving. He reports no head trauma. He notes that turning his body to the right he feels the room spinning. \par \par He has been more sedentary. He has been doing more PT. . \par  He has been having mild dyspnea on exertion. He denies palpitations, dyspnea, PND, orthopnea, lower extremity edema, LOC. He has been taking his Eliquis as prescribed without any melena, excessive bleeding, bruising. He is compliant with his medications.   \par

## 2020-12-01 NOTE — DISCUSSION/SUMMARY
[FreeTextEntry1] : 70 year old man with HTN, HLD, DM and permanent atrial fibrillation who presents to the office for follow-up. \par \par Rex is complaining of significant lower extremity weakness from his DM neuropathy. He jaquan continue with PT, neurontin, Cymbalta. consider Alpha lipoic acid. He is having vertigo and will try meclizine. Given his AC and trauma he will get a head CT to r/o an bleed. He will followup with neuro. \par \par His lipids are still uncontrolled. He is tolerating the Vascepa. He is back on the Rouvastatin 10mg 3 times a week. I would titrate it back to Qday when possible. \par  \par  He denies any anginal symptoms. Clinically he is euvolemic on exam. I will defer his stress test till other evaluation is complete. He had an echo in 7/2020 that showed normal systolic LV function without any significant other findings, including no significant valvular disease. \par  \par  He is still in AF. His heart rate is well controlled.  There are no signs or symptoms of abnormal bleeding, and he will continue Eliquis for stroke risk reduction. At present, I see no benefit to restoring NSR. \par \par His blood pressure is controlled. He will continue his current regiment. He will continue Hydralazine 50mg Q12.  . \par  \par Exercise, diet and lifestyle modification counseling was performed  to reduce his CV risk.  \par \par He will follow with me in 3 months. he will follow up with you fall of his other medical issues.

## 2020-12-03 ENCOUNTER — RX RENEWAL (OUTPATIENT)
Age: 70
End: 2020-12-03

## 2020-12-08 ENCOUNTER — RX RENEWAL (OUTPATIENT)
Age: 70
End: 2020-12-08

## 2020-12-09 DIAGNOSIS — Z11.59 ENCOUNTER FOR SCREENING FOR OTHER VIRAL DISEASES: ICD-10-CM

## 2020-12-10 ENCOUNTER — NON-APPOINTMENT (OUTPATIENT)
Age: 70
End: 2020-12-10

## 2020-12-12 ENCOUNTER — TRANSCRIPTION ENCOUNTER (OUTPATIENT)
Age: 70
End: 2020-12-12

## 2020-12-14 ENCOUNTER — NON-APPOINTMENT (OUTPATIENT)
Age: 70
End: 2020-12-14

## 2020-12-15 ENCOUNTER — RX RENEWAL (OUTPATIENT)
Age: 70
End: 2020-12-15

## 2020-12-15 PROBLEM — Z87.09 HISTORY OF SORE THROAT: Status: RESOLVED | Noted: 2017-12-04 | Resolved: 2020-12-15

## 2020-12-15 PROBLEM — J06.9 ACUTE RESPIRATORY DISEASE: Status: RESOLVED | Noted: 2017-12-04 | Resolved: 2020-12-15

## 2020-12-23 NOTE — ASSESSMENT
[FreeTextEntry1] : \par \par \par cough,  s/p \par URI\par Likely postinfectious etiologist\par Causes of cough reviewed with patient\par Including latent asthma GERD, medication related\par Recent URI\par Recommend chest x-ray\par Prescription given for Symbicort/Tessalon Perles\par Rinse mouth out out after use\par \par  Influenza Vaccination

## 2021-01-15 ENCOUNTER — APPOINTMENT (OUTPATIENT)
Dept: INTERNAL MEDICINE | Facility: CLINIC | Age: 71
End: 2021-01-15
Payer: COMMERCIAL

## 2021-01-15 PROCEDURE — 99072 ADDL SUPL MATRL&STAF TM PHE: CPT

## 2021-01-15 PROCEDURE — 36415 COLL VENOUS BLD VENIPUNCTURE: CPT

## 2021-01-16 LAB
ALBUMIN SERPL ELPH-MCNC: 4.6 G/DL
ALP BLD-CCNC: 71 U/L
ALT SERPL-CCNC: 18 U/L
ANION GAP SERPL CALC-SCNC: 16 MMOL/L
AST SERPL-CCNC: 15 U/L
BILIRUB SERPL-MCNC: 0.7 MG/DL
BUN SERPL-MCNC: 19 MG/DL
CALCIUM SERPL-MCNC: 9.6 MG/DL
CHLORIDE SERPL-SCNC: 100 MMOL/L
CHOLEST SERPL-MCNC: 176 MG/DL
CK SERPL-CCNC: 30 U/L
CO2 SERPL-SCNC: 26 MMOL/L
CREAT SERPL-MCNC: 1.25 MG/DL
GLUCOSE SERPL-MCNC: 175 MG/DL
HDLC SERPL-MCNC: 45 MG/DL
LDLC SERPL CALC-MCNC: 85 MG/DL
NONHDLC SERPL-MCNC: 131 MG/DL
POTASSIUM SERPL-SCNC: 4.9 MMOL/L
PROT SERPL-MCNC: 6.8 G/DL
SODIUM SERPL-SCNC: 141 MMOL/L
TRIGL SERPL-MCNC: 228 MG/DL

## 2021-01-19 ENCOUNTER — APPOINTMENT (OUTPATIENT)
Dept: ENDOCRINOLOGY | Facility: CLINIC | Age: 71
End: 2021-01-19
Payer: COMMERCIAL

## 2021-01-19 ENCOUNTER — RX RENEWAL (OUTPATIENT)
Age: 71
End: 2021-01-19

## 2021-01-19 VITALS
SYSTOLIC BLOOD PRESSURE: 130 MMHG | WEIGHT: 151 LBS | TEMPERATURE: 97.9 F | BODY MASS INDEX: 23.7 KG/M2 | OXYGEN SATURATION: 97 % | DIASTOLIC BLOOD PRESSURE: 91 MMHG | HEART RATE: 87 BPM | HEIGHT: 67 IN

## 2021-01-19 LAB — HBA1C MFR BLD HPLC: 6.6

## 2021-01-19 PROCEDURE — 99204 OFFICE O/P NEW MOD 45 MIN: CPT | Mod: 25

## 2021-01-19 PROCEDURE — 99072 ADDL SUPL MATRL&STAF TM PHE: CPT

## 2021-01-19 PROCEDURE — 83036 HEMOGLOBIN GLYCOSYLATED A1C: CPT | Mod: QW

## 2021-01-19 RX ORDER — DULOXETINE HYDROCHLORIDE 30 MG/1
30 CAPSULE, DELAYED RELEASE PELLETS ORAL
Qty: 30 | Refills: 0 | Status: DISCONTINUED | COMMUNITY
Start: 2020-11-06 | End: 2021-01-19

## 2021-01-19 RX ORDER — GABAPENTIN 100 MG/1
100 CAPSULE ORAL
Qty: 90 | Refills: 0 | Status: DISCONTINUED | COMMUNITY
Start: 2020-08-21 | End: 2021-01-19

## 2021-01-19 NOTE — REASON FOR VISIT
[Consultation] : a consultation visit [DM Type 2] : DM Type 2 [Spouse] : spouse [FreeTextEntry2] : Richard Mathis MD

## 2021-01-19 NOTE — REVIEW OF SYSTEMS
[Fatigue] : fatigue [Recent Weight Loss (___ Lbs)] : recent weight loss: [unfilled] lbs [Pain/Numbness of Digits] : pain/numbness of digits [Poor Balance] : poor balance [Polydipsia] : polydipsia [Negative] : Gastrointestinal [Polyuria] : no polyuria [Cold Intolerance] : no cold intolerance [Heat Intolerance] : no heat intolerance [Swelling] : no swelling [de-identified] : skin lesions and sees dermatology

## 2021-01-19 NOTE — PHYSICAL EXAM
[Alert] : alert [No Acute Distress] : no acute distress [Normal Sclera/Conjunctiva] : normal sclera/conjunctiva [EOMI] : extra ocular movement intact [No LAD] : no lymphadenopathy [Thyroid Not Enlarged] : the thyroid was not enlarged [No Thyroid Nodules] : no palpable thyroid nodules [No Respiratory Distress] : no respiratory distress [Clear to Auscultation] : lungs were clear to auscultation bilaterally [Normal S1, S2] : normal S1 and S2 [Normal Bowel Sounds] : normal bowel sounds [Not Tender] : non-tender [Not Distended] : not distended [Soft] : abdomen soft [Normal Anterior Cervical Nodes] : no anterior cervical lymphadenopathy [No Clubbing, Cyanosis] : no clubbing  or cyanosis of the fingernails [Normal Reflexes] : deep tendon reflexes were 2+ and symmetric [Normal Affect] : the affect was normal [Normal Mood] : the mood was normal [Right Foot Was Examined] : right foot ~C was examined [Left Foot Was Examined] : left foot ~C was examined [Swelling] : swollen [Erythema] : erythematous [Normal] : normal [2+] : 2+ in the dorsalis pedis [Diminished Throughout Both Feet] : diminished tactile sensation with monofilament testing throughout both feet [de-identified] : irreg,irreg [de-identified] : mild edema b/l [de-identified] : skin lesions noted

## 2021-01-19 NOTE — CONSULT LETTER
[Dear  ___] : Dear  [unfilled], [Consult Letter:] : I had the pleasure of evaluating your patient, [unfilled]. [( Thank you for referring [unfilled] for consultation for _____ )] : Thank you for referring [unfilled] for consultation for [unfilled] [Please see my note below.] : Please see my note below. [Consult Closing:] : Thank you very much for allowing me to participate in the care of this patient.  If you have any questions, please do not hesitate to contact me. [Sincerely,] : Sincerely, [FreeTextEntry2] : Richard Mathis MD\par [address]\[place] [address] [FreeTextEntry3] : Walter Kay DO\par  of Medicine\par [facility] [employer] [facility]\par

## 2021-01-19 NOTE — HISTORY OF PRESENT ILLNESS
[FreeTextEntry1] : 70 y.o. male with h/o Type 2 DM diagnosed about 7 years ago presents for management. Follows with cardiology for a fib and HTN. \par \par He reports neuropathy which worsened in 2020 after a fall.  Also reports hand tingling. Seeing neurology and diagnosed with peripheral neuropathy. Taking Gabapentin and Cymbalta. Seeing podiatry. Seeing nephrology for proteinuria. UTD with ophthalmology (11/2020) and no retinopathy. Being followed for glaucoma and uses Latanoprost eye drops. Does PT. Some walking. Reports poor balance. \par \par \par He checks FS every morning and are 128 to 190. No hypoglycemia. Taking Metformin 1,000 mg BID. No GI side effects. \par \par Diet:\par Breakfast: no skip\par Lunch: chicken salad sandwich\par Dinner: veal parm, Chinese or Thai\par Snack bedtime: ice cream, banana and cream\par Drink: water\par \par Overall feels fatigue. Reports weight loss with decreased appetite. Does report polydipsia. No polyuria but does get urgency. No blurry vision. Most concerning problem is his neuropathy and overall balance issues. No SOB or CP.

## 2021-01-19 NOTE — ASSESSMENT
[Diabetes Foot Care] : diabetes foot care [Long Term Vascular Complications] : long term vascular complications of diabetes [Carbohydrate Consistent Diet] : carbohydrate consistent diet [Importance of Diet and Exercise] : importance of diet and exercise to improve glycemic control, achieve weight loss and improve cardiovascular health [Hypoglycemia Management] : hypoglycemia management [Self Monitoring of Blood Glucose] : self monitoring of blood glucose [FreeTextEntry1] : 70 y.o. 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 Type 2 DM- Good control with Hba1c of 6.6% today. Discussed pathophysiology. Reviewed blood glucose and Hba1c goals. Encouraged a carbohydrate consistent diet and activity as tolerated. Dietary literature was provided to the patient. Discussed risks and benefits of Metformin. Also discussed adding a SGLT-2 inhibitor which would provide renal and cardiovascular benefits: therefore, will discuss with nephrology. Reviewed risks and benefits of SGLT-2 inhibitors. Would decrease Metformin to 500 mg BID if SGLT-2 inhibitor is added. \par \par 2. Neuropathy- Agree with Gabapentin and Cymbalta. Will follow up with neurology. Agree with PT. Discussed importance of foot care and follow up with podiatry.\par \par 3. HTN- BP is at goal and will continue current medications including ARB\par \par 4. Hyperlipidemia- Will continue statin and Vascepa. Encouraged low fat diet\par \par 5. Vitamin D def- Will continue vitamin D supplement.\par \par Follow up in 4 months

## 2021-01-27 ENCOUNTER — RX RENEWAL (OUTPATIENT)
Age: 71
End: 2021-01-27

## 2021-02-01 ENCOUNTER — NON-APPOINTMENT (OUTPATIENT)
Age: 71
End: 2021-02-01

## 2021-02-08 ENCOUNTER — NON-APPOINTMENT (OUTPATIENT)
Age: 71
End: 2021-02-08

## 2021-02-09 ENCOUNTER — APPOINTMENT (OUTPATIENT)
Dept: OPHTHALMOLOGY | Facility: CLINIC | Age: 71
End: 2021-02-09

## 2021-02-09 DIAGNOSIS — T14.90XA INJURY, UNSPECIFIED, INITIAL ENCOUNTER: ICD-10-CM

## 2021-02-13 ENCOUNTER — APPOINTMENT (OUTPATIENT)
Dept: RADIOLOGY | Facility: CLINIC | Age: 71
End: 2021-02-13
Payer: COMMERCIAL

## 2021-02-13 ENCOUNTER — OUTPATIENT (OUTPATIENT)
Dept: OUTPATIENT SERVICES | Facility: HOSPITAL | Age: 71
LOS: 1 days | End: 2021-02-13
Payer: COMMERCIAL

## 2021-02-13 DIAGNOSIS — T14.90XA INJURY, UNSPECIFIED, INITIAL ENCOUNTER: ICD-10-CM

## 2021-02-13 PROCEDURE — 73502 X-RAY EXAM HIP UNI 2-3 VIEWS: CPT

## 2021-02-13 PROCEDURE — 73502 X-RAY EXAM HIP UNI 2-3 VIEWS: CPT | Mod: 26

## 2021-02-23 ENCOUNTER — APPOINTMENT (OUTPATIENT)
Dept: OPHTHALMOLOGY | Facility: CLINIC | Age: 71
End: 2021-02-23
Payer: COMMERCIAL

## 2021-02-23 ENCOUNTER — NON-APPOINTMENT (OUTPATIENT)
Age: 71
End: 2021-02-23

## 2021-02-23 PROCEDURE — 92083 EXTENDED VISUAL FIELD XM: CPT

## 2021-02-23 PROCEDURE — 92250 FUNDUS PHOTOGRAPHY W/I&R: CPT

## 2021-02-23 PROCEDURE — 92014 COMPRE OPH EXAM EST PT 1/>: CPT

## 2021-02-23 PROCEDURE — 99072 ADDL SUPL MATRL&STAF TM PHE: CPT

## 2021-03-01 ENCOUNTER — NON-APPOINTMENT (OUTPATIENT)
Age: 71
End: 2021-03-01

## 2021-03-01 ENCOUNTER — APPOINTMENT (OUTPATIENT)
Dept: CARDIOLOGY | Facility: CLINIC | Age: 71
End: 2021-03-01
Payer: COMMERCIAL

## 2021-03-01 VITALS — DIASTOLIC BLOOD PRESSURE: 70 MMHG | SYSTOLIC BLOOD PRESSURE: 110 MMHG

## 2021-03-01 VITALS
SYSTOLIC BLOOD PRESSURE: 141 MMHG | HEART RATE: 115 BPM | DIASTOLIC BLOOD PRESSURE: 94 MMHG | HEIGHT: 67 IN | BODY MASS INDEX: 23.39 KG/M2 | OXYGEN SATURATION: 97 % | WEIGHT: 149 LBS

## 2021-03-01 PROCEDURE — 99072 ADDL SUPL MATRL&STAF TM PHE: CPT

## 2021-03-01 PROCEDURE — 93000 ELECTROCARDIOGRAM COMPLETE: CPT

## 2021-03-01 PROCEDURE — 99214 OFFICE O/P EST MOD 30 MIN: CPT

## 2021-03-01 RX ORDER — HYDROCHLOROTHIAZIDE 25 MG/1
25 TABLET ORAL DAILY
Qty: 90 | Refills: 3 | Status: DISCONTINUED | COMMUNITY
Start: 2020-03-18 | End: 2021-03-01

## 2021-03-01 NOTE — REVIEW OF SYSTEMS
[see HPI] : see HPI [Shortness Of Breath] : no shortness of breath [Dyspnea on exertion] : not dyspnea during exertion [Chest  Pressure] : no chest pressure [Chest Pain] : no chest pain [Lower Ext Edema] : no extremity edema [Leg Claudication] : no intermittent leg claudication [Palpitations] : no palpitations [Joint Pain] : joint pain [Dizziness] : no dizziness [Numbness (Hypesthesia)] : numbness [Tingling (Paresthesia)] : tingling [Negative] : Heme/Lymph

## 2021-03-01 NOTE — HISTORY OF PRESENT ILLNESS
[FreeTextEntry1] : 70 year old man with a history of permanent atrial fibrillation, HTN, HLD, allergic conjunctivitis who is here for a followup.  \par \par Since his last visit, he is now walking with a walker from his diabetic neuropathy. He has not fallen with the walker over the last 2 week.  He has been more sedentary.  He stopped PT because of back pain caused by a fall a few weeks ago. \par  He has been having mild dyspnea on exertion. He denies palpitations, dyspnea, PND, orthopnea, lower extremity edema, LOC. He has been taking his Eliquis as prescribed without any melena, excessive bleeding, bruising. He is compliant with his medications.   \par

## 2021-03-01 NOTE — DISCUSSION/SUMMARY
[FreeTextEntry1] : 71 year old man with HTN, HLD, DM and permanent atrial fibrillation who presents to the office for follow-up. \par \par Rex is complaining of significant lower extremity weakness from his DM neuropathy. He jaquan continue with , neurontin, Cymbalta. consider Alpha lipoic acid. He will need to monitor for worsening gait imbalance given his use of AC. \par \par His lipids are better controlled on Crestor QOD.  He is tolerating the Vascepa. His triglycerides have improved.\par \par  He denies any anginal symptoms. Clinically he is euvolemic on exam. I will defer his stress test till other evaluation is complete. He had an echo in 7/2020 that showed normal systolic LV function without any significant other findings, including no significant valvular disease. \par  \par  He is still in AF. His heart rate is well controlled.  He will continue Atenolol 100mg Qday. There are no signs or symptoms of abnormal bleeding, and he will continue Eliquis for stroke risk reduction. At present, I see no benefit to restoring NSR. \par \par His blood pressure is controlled. He will continue his current regiment. He will continue Olmesartan, eplerenone, Norvasc, Hydralazine. He is off the HCTZ because of his Farxiga. He had stopped both recently. He will resume the Farxiga. \par  \par Exercise, diet and lifestyle modification counseling was performed  to reduce his CV risk.  \par \par He will follow with me in 3 months. he will follow up with you fall of his other medical issues.

## 2021-03-01 NOTE — PHYSICAL EXAM
[Normal Appearance] : normal appearance [Well Groomed] : well groomed [General Appearance - In No Acute Distress] : no acute distress [Normal Conjunctiva] : the conjunctiva exhibited no abnormalities [Eyelids - No Xanthelasma] : the eyelids demonstrated no xanthelasmas [No Oral Pallor] : no oral pallor [No Oral Cyanosis] : no oral cyanosis [Normal Jugular Venous V Waves Present] : normal jugular venous V waves present [Respiration, Rhythm And Depth] : normal respiratory rhythm and effort [Exaggerated Use Of Accessory Muscles For Inspiration] : no accessory muscle use [Auscultation Breath Sounds / Voice Sounds] : lungs were clear to auscultation bilaterally [Bowel Sounds] : normal bowel sounds [Abdomen Soft] : soft [Abdomen Tenderness] : non-tender [FreeTextEntry1] : walking with a walker.  [Nail Clubbing] : no clubbing of the fingernails [Cyanosis, Localized] : no localized cyanosis [Petechial Hemorrhages (___cm)] : no petechial hemorrhages [Skin Color & Pigmentation] : normal skin color and pigmentation [] : no rash [Oriented To Time, Place, And Person] : oriented to person, place, and time [Affect] : the affect was normal [Mood] : the mood was normal [No Anxiety] : not feeling anxious [Not Palpable] : not palpable [No Precordial Heave] : no precordial heave was noted [Normal Rate] : normal [Irregularly Irregular] : irregularly irregular [Normal S1] : normal S1 [Normal S2] : normal S2 [No Gallop] : no gallop heard [No Murmur] : no murmurs heard [2+] : left 2+ [Right Carotid Bruit] : no bruit heard over the right carotid [Left Carotid Bruit] : no bruit heard over the left carotid [No Abnormalities] : the abdominal aorta was not enlarged and no bruit was heard [Bruit] : no bruit heard [No Pitting Edema] : no pitting edema present

## 2021-03-15 ENCOUNTER — APPOINTMENT (OUTPATIENT)
Dept: RADIOLOGY | Facility: HOSPITAL | Age: 71
End: 2021-03-15
Payer: COMMERCIAL

## 2021-03-15 ENCOUNTER — OUTPATIENT (OUTPATIENT)
Dept: OUTPATIENT SERVICES | Facility: HOSPITAL | Age: 71
LOS: 1 days | End: 2021-03-15
Payer: COMMERCIAL

## 2021-03-15 ENCOUNTER — RESULT REVIEW (OUTPATIENT)
Age: 71
End: 2021-03-15

## 2021-03-15 DIAGNOSIS — Z00.00 ENCOUNTER FOR GENERAL ADULT MEDICAL EXAMINATION WITHOUT ABNORMAL FINDINGS: ICD-10-CM

## 2021-03-15 DIAGNOSIS — G91.2 (IDIOPATHIC) NORMAL PRESSURE HYDROCEPHALUS: ICD-10-CM

## 2021-03-15 LAB
APPEARANCE CSF: CLEAR — SIGNIFICANT CHANGE UP
COLOR CSF: SIGNIFICANT CHANGE UP
GLUCOSE CSF-MCNC: 84 MG/DL — HIGH (ref 40–70)
LYMPHOCYTES # CSF: 33 % — LOW (ref 40–80)
MONOS+MACROS NFR CSF: 67 % — HIGH (ref 15–45)
NEUTROPHILS # CSF: 0 % — SIGNIFICANT CHANGE UP (ref 0–6)
NRBC NFR CSF: 1 /UL — SIGNIFICANT CHANGE UP (ref 0–5)
PROT CSF-MCNC: 54 MG/DL — HIGH (ref 15–45)
RBC # CSF: 1 /UL — HIGH (ref 0–0)
TUBE TYPE: SIGNIFICANT CHANGE UP

## 2021-03-15 PROCEDURE — 82945 GLUCOSE OTHER FLUID: CPT

## 2021-03-15 PROCEDURE — 88108 CYTOPATH CONCENTRATE TECH: CPT

## 2021-03-15 PROCEDURE — 89051 BODY FLUID CELL COUNT: CPT

## 2021-03-15 PROCEDURE — 88108 CYTOPATH CONCENTRATE TECH: CPT | Mod: 26

## 2021-03-15 PROCEDURE — 86592 SYPHILIS TEST NON-TREP QUAL: CPT

## 2021-03-15 PROCEDURE — 84157 ASSAY OF PROTEIN OTHER: CPT

## 2021-03-15 PROCEDURE — 62328 DX LMBR SPI PNXR W/FLUOR/CT: CPT

## 2021-03-17 LAB — VDRL CSF-TITR: SIGNIFICANT CHANGE UP

## 2021-03-18 LAB — NON-GYNECOLOGICAL CYTOLOGY STUDY: SIGNIFICANT CHANGE UP

## 2021-04-07 ENCOUNTER — APPOINTMENT (OUTPATIENT)
Dept: NEUROSURGERY | Facility: CLINIC | Age: 71
End: 2021-04-07
Payer: COMMERCIAL

## 2021-04-07 VITALS
SYSTOLIC BLOOD PRESSURE: 143 MMHG | OXYGEN SATURATION: 98 % | HEART RATE: 92 BPM | WEIGHT: 148 LBS | TEMPERATURE: 98.2 F | HEIGHT: 67 IN | DIASTOLIC BLOOD PRESSURE: 92 MMHG | BODY MASS INDEX: 23.23 KG/M2

## 2021-04-07 PROCEDURE — 99205 OFFICE O/P NEW HI 60 MIN: CPT

## 2021-04-07 PROCEDURE — 99072 ADDL SUPL MATRL&STAF TM PHE: CPT

## 2021-04-09 NOTE — ASSESSMENT
[FreeTextEntry1] : 2021\par \par Anatoliy Lopez MD\par [facility]\par [address] [place] [address]\[place] [address]\par \par Re:	Rex Jarrell\par :	1950\par \par Dear Dr. Lopez:\par \par Thank you for sending me Mr. 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  The patient is a 71-year-old right-handed male who has been retired since age 62 and has been incredibly active.  A year and a half ago, he was playing tennis 3 or 4 times a week and playing softball.  About a year or so ago, he fell while playing softball and had difficulty getting up.  He went to [place]; he felt the warm weather would help, and he had an inability to play tennis.  He felt that his legs locked up.  Then the COVID pandemic hit.  He developed paresthesias in his calves bilaterally.  This was a year ago.  He stopped statins.  This did not help, and he was seen by yourself multiple times, and he was diagnosed with neuropathy.  He was checked for Lyme disease with an LP and it was negative, a bone marrow was negative, and also the diagnosis of diabetic neuropathy was entertained.  Through this, his current chief complaint is tingling in his toes and balance.  He trips and he stumbles and has had a tremendous decrement in his ability to walk, and he walks with walkers in the house.  He also has tingling in his fingers that is supposedly neuropathy.\par \par His past medical history is positive for diabetes, hypertension, and atrial fibrillation for which he is on Eliquis.  He also is on gabapentin.  He has a past surgical history of melanoma 6 years ago, a hydrocele, and Dupuytren's contracture in the right hand x2.  Social history is negative for cigarettes.  Family history is negative, and review of systems is negative for any significant cognitive change.  He has urinary frequency but no incontinence.  His wife claims that he is weak globally and has had muscle atrophy in all of his extremities.  \par \par On neurologic exam, he has 4+/5 strength in the upper extremities and appears to have normal strength in the lower extremities.  Extraocular movements are intact, and his reflexes are 1+ to 0.  He has almost a magnetic-type gait.  He has difficulty moving his feet, and he has to hold on to the wall to walk.  He has difficulty turning around.  It is difficult for him to stand with his eyes closed with his heels together, and I did not even perform heel-to-toe testing, as I thought he would fall.\par \par His MRI shows mild increase in the size of the ventricular system somewhat out of proportion to the atrophy that is seen in the sylvian fissure.  There is also significant white matter change read as ischemia in both hemispheres, and this does extend down and is seen also throughout the lor.  Additionally, when this was all being investigated, he was sent recently to [facility] for a high-volume lumbar puncture.  Thirty milliliters were removed, and he had absolutely no improvement in his gait.\par \par IMPRESSION:  He certainly does not have a classic triad of normal pressure hydrocephalus and did not improve with a lumbar puncture.  He has a fulminant decline in motor function in the lower extremities over a 14-month period, and I do not think this is consistent with normal pressure hydrocephalus, and I would be hesitant to perform a shunt in this setting.  As far as a lumbar drain is concerned, I do not think this would add anything since he did not respond to the spinal tap.\par \par I explained all this to the patient, and he will follow with you.  \par \par I thank you for the confidence of this referral.\par \par Sincerely,\par \par \par \par Ovi Becker M.D., F.A.C.S.\par [facility]\par \par \par

## 2021-04-09 NOTE — HISTORY OF PRESENT ILLNESS
[de-identified] : 70 yo right handed male with PMH of DM, HTN, AFIB (on Eliquis), presents to the office for neurosurgical consultation for Normal Pressure Hydrocephalus. Approximately, 2/2020 while he was playing baseball, his legs gave out. He didn’t think much of it and continued on. [facility] had difficulty [facility] where his legs locked. He also has numbness and tingling in calfs down to toes and fingers. He was taken off of statins at that time, assuming that was the cause of tingling. He consulted with Dr. Lopez\par who performed workup which included EMG, diagnostic of Carpel Tunnel. 2/26/21 MRA unremarkable,MRI showed .........................\par LP was performed at [facility], 30 cc clear fluid was obtained, but patient's symptoms did not improve. \par He underwent a bone marrow biopsy which was negative. \par He has unsteadiness and imbalance. There are no cognitive issues as patient is completely sharp and intact. He has some STM loss. Denies urinary inct or visual disturbance. \par This patient was extremely active a year ago playing tennis daily and baseball twice weekly. He is unable to ambulate safely at this point. \par This is not a classic NPH diagnosis and patient did not improve after LP. \par He takes Gabapentin for diabetic neuropathy.\par \par Plan: follow with Dr. Lopez

## 2021-04-09 NOTE — PHYSICAL EXAM
[General Appearance - Alert] : alert [General Appearance - In No Acute Distress] : in no acute distress [Oriented To Time, Place, And Person] : oriented to person, place, and time [Impaired Insight] : insight and judgment were intact [Person] : oriented to person [Place] : oriented to place [Time] : oriented to time [Short Term Intact] : short term memory intact [Remote Intact] : remote memory intact [Span Intact] : the attention span was normal [Concentration Intact] : normal concentrating ability [Fluency] : fluency intact [Comprehension] : comprehension intact [Current Events] : adequate knowledge of current events [Past History] : adequate knowledge of personal past history [Vocabulary] : adequate range of vocabulary [Cranial Nerves Oculomotor (III)] : extraocular motion intact [Cranial Nerves Trigeminal (V)] : facial sensation intact symmetrically [Cranial Nerves Facial (VII)] : face symmetrical [Cranial Nerves Vestibulocochlear (VIII)] : hearing was intact bilaterally [Cranial Nerves Glossopharyngeal (IX)] : tongue and palate midline [Cranial Nerves Accessory (XI - Cranial And Spinal)] : head turning and shoulder shrug symmetric [Cranial Nerves Hypoglossal (XII)] : there was no tongue deviation with protrusion [Motor Tone] : muscle tone was normal in all four extremities [Motor Strength] : muscle strength was normal in all four extremities [No Muscle Atrophy] : normal bulk in all four extremities [Sensation Tactile Decrease] : light touch was intact [Motor Handedness Right-Handed] : the patient is right hand dominant [Limited Balance] : the patient's balance was impaired [1+] : Patella left 1+ [Extraocular Movements] : extraocular movements were intact [Outer Ear] : the ears and nose were normal in appearance [Neck Appearance] : the appearance of the neck was normal [] : no respiratory distress [Respiration, Rhythm And Depth] : normal respiratory rhythm and effort [Exaggerated Use Of Accessory Muscles For Inspiration] : no accessory muscle use [Heart Rate And Rhythm] : heart rate was normal and rhythm regular [Involuntary Movements] : no involuntary movements were seen [Skin Color & Pigmentation] : normal skin color and pigmentation [Skin Turgor] : normal skin turgor [Romberg's Sign] : Romberg's sign was negtive [Past-pointing] : there was no past-pointing [Tremor] : no tremor present [FreeTextEntry6] : no drift [FreeTextEntry8] : gait is wide stepped, unsteady especially on turning [FreeTextEntry1] : gait described in note

## 2021-04-09 NOTE — REVIEW OF SYSTEMS
[Depression] : depression [Memory Lapses or Loss] : memory loss [Numbness] : numbness [Tingling] : tingling [Difficulty Walking] : difficulty walking [Negative] : Heme/Lymph [de-identified] : imbalance [FreeTextEntry8] : urinary freqency

## 2021-05-24 ENCOUNTER — APPOINTMENT (OUTPATIENT)
Dept: ENDOCRINOLOGY | Facility: CLINIC | Age: 71
End: 2021-05-24
Payer: COMMERCIAL

## 2021-05-24 VITALS
OXYGEN SATURATION: 96 % | HEART RATE: 82 BPM | SYSTOLIC BLOOD PRESSURE: 138 MMHG | DIASTOLIC BLOOD PRESSURE: 94 MMHG | HEIGHT: 67 IN | WEIGHT: 145 LBS | BODY MASS INDEX: 22.76 KG/M2 | TEMPERATURE: 97.6 F

## 2021-05-24 VITALS — SYSTOLIC BLOOD PRESSURE: 110 MMHG | DIASTOLIC BLOOD PRESSURE: 70 MMHG

## 2021-05-24 DIAGNOSIS — E55.9 VITAMIN D DEFICIENCY, UNSPECIFIED: ICD-10-CM

## 2021-05-24 LAB — HBA1C MFR BLD HPLC: 6.7

## 2021-05-24 PROCEDURE — 36415 COLL VENOUS BLD VENIPUNCTURE: CPT

## 2021-05-24 PROCEDURE — 83036 HEMOGLOBIN GLYCOSYLATED A1C: CPT | Mod: QW

## 2021-05-24 PROCEDURE — 99072 ADDL SUPL MATRL&STAF TM PHE: CPT

## 2021-05-24 PROCEDURE — 99214 OFFICE O/P EST MOD 30 MIN: CPT | Mod: 25

## 2021-05-24 NOTE — PHYSICAL EXAM
[Alert] : alert [No Acute Distress] : no acute distress [Normal Sclera/Conjunctiva] : normal sclera/conjunctiva [EOMI] : extra ocular movement intact [No LAD] : no lymphadenopathy [Thyroid Not Enlarged] : the thyroid was not enlarged [No Thyroid Nodules] : no palpable thyroid nodules [No Respiratory Distress] : no respiratory distress [Clear to Auscultation] : lungs were clear to auscultation bilaterally [Normal S1, S2] : normal S1 and S2 [Normal Bowel Sounds] : normal bowel sounds [Not Tender] : non-tender [Not Distended] : not distended [Soft] : abdomen soft [Normal Anterior Cervical Nodes] : no anterior cervical lymphadenopathy [No Clubbing, Cyanosis] : no clubbing  or cyanosis of the fingernails [Right Foot Was Examined] : right foot ~C was examined [Left Foot Was Examined] : left foot ~C was examined [Swelling] : swollen [Erythema] : erythematous [Normal] : normal [2+] : 2+ in the dorsalis pedis [Diminished Throughout Both Feet] : diminished tactile sensation with monofilament testing throughout both feet [Normal Reflexes] : deep tendon reflexes were 2+ and symmetric [Normal Affect] : the affect was normal [Normal Mood] : the mood was normal [de-identified] : irreg,irreg [de-identified] : mild edema b/l [de-identified] : skin lesions noted

## 2021-05-24 NOTE — REVIEW OF SYSTEMS
[Fatigue] : fatigue [Recent Weight Loss (___ Lbs)] : recent weight loss: [unfilled] lbs [Polyuria] : polyuria [Pain/Numbness of Digits] : pain/numbness of digits [Poor Balance] : poor balance [Polydipsia] : polydipsia [Negative] : Gastrointestinal [Cold Intolerance] : no cold intolerance [Heat Intolerance] : no heat intolerance [Swelling] : no swelling [FreeTextEntry8] : urgency [de-identified] : skin lesions and sees dermatology

## 2021-05-24 NOTE — HISTORY OF PRESENT ILLNESS
[FreeTextEntry1] : 71 y.o. male with h/o Type 2 DM diagnosed in 2014 presents for follow up visit.  Follows with cardiology for a fib and HTN. \par \par He reports neuropathy which worsened in 2020 after a fall.  Also reports hand tingling. Seeing neurology and diagnosed with peripheral neuropathy. Taking Gabapentin and Cymbalta. Seeing podiatry. Seeing nephrology for proteinuria. UTD with ophthalmology (2/2021) and no retinopathy. Being followed for glaucoma and uses Latanoprost eye drops. Was doing PT. Reports some walking. Reports poor balance. \par \par \par He checks FS every morning and are 140s. No hypoglycemia. Taking Metformin 1,000 mg daily and Farxiga 5 mg daily (since 2/2021). Does reports increased frequency of urination and urgency since starting Farxiga. Does report bowel urgency at times. \par \par Diet:\par Breakfast: no skip\par Lunch: chicken salad sandwich\par Dinner: veal parm, Chinese or Malay\par Snack bedtime: ice cream, banana and cream\par Drink: water\par \par Overall feels fatigue. Reports weight loss with decreased appetite. Does report polydipsia. No polyuria but does get urgency. No blurry vision. Most concerning problem is his neuropathy and overall balance issues. No SOB or CP.

## 2021-05-24 NOTE — ASSESSMENT
[Diabetes Foot Care] : diabetes foot care [Long Term Vascular Complications] : long term vascular complications of diabetes [Carbohydrate Consistent Diet] : carbohydrate consistent diet [Importance of Diet and Exercise] : importance of diet and exercise to improve glycemic control, achieve weight loss and improve cardiovascular health [Self Monitoring of Blood Glucose] : self monitoring of blood glucose [FreeTextEntry1] : 71 y.o. 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 Type 2 DM- Good control with Hba1c of 6.7% today. Discussed pathophysiology. Reviewed blood glucose and Hba1c goals. Encouraged a carbohydrate consistent diet and activity as tolerated. Discussed risks and benefits of Metformin. WIll continue Metformin 1,000 mg daily. Will decrease Farxiga 5 mg to every other day for now given urinary symptoms. May need to urology evaluation. Reviewed risks and benefits of SGLT-2 inhibitors. Will check CMP and urine microalb/cr ratio.  \par \par 2. Neuropathy- Agree with Gabapentin and Cymbalta. Will follow up with neurology. Agree with PT. Discussed importance of foot care and follow up with podiatry.\par \par 3. HTN- BP is at goal and will continue current medications including ARB\par \par 4. Hyperlipidemia- Will check lipids. Will continue statin and Vascepa. Encouraged low fat diet\par \par 5. Vitamin D def- Will check 25 vitamin D level. Will continue vitamin D supplement.\par \par Follow up in 4 months\par Labs drawn in the office today

## 2021-05-25 LAB
25(OH)D3 SERPL-MCNC: 27.7 NG/ML
ALBUMIN SERPL ELPH-MCNC: 4.3 G/DL
ALP BLD-CCNC: 80 U/L
ALT SERPL-CCNC: 15 U/L
ANION GAP SERPL CALC-SCNC: 16 MMOL/L
AST SERPL-CCNC: 14 U/L
BASOPHILS # BLD AUTO: 0.08 K/UL
BASOPHILS NFR BLD AUTO: 0.7 %
BILIRUB SERPL-MCNC: 0.7 MG/DL
BUN SERPL-MCNC: 16 MG/DL
CALCIUM SERPL-MCNC: 9.6 MG/DL
CHLORIDE SERPL-SCNC: 101 MMOL/L
CHOLEST SERPL-MCNC: 172 MG/DL
CO2 SERPL-SCNC: 22 MMOL/L
CREAT SERPL-MCNC: 0.94 MG/DL
CREAT SPEC-SCNC: 66 MG/DL
EOSINOPHIL # BLD AUTO: 0.13 K/UL
EOSINOPHIL NFR BLD AUTO: 1.2 %
FOLATE SERPL-MCNC: >20 NG/ML
GLUCOSE SERPL-MCNC: 159 MG/DL
HCT VFR BLD CALC: 51.9 %
HDLC SERPL-MCNC: 49 MG/DL
HGB BLD-MCNC: 16.4 G/DL
IMM GRANULOCYTES NFR BLD AUTO: 0.5 %
LDLC SERPL CALC-MCNC: 90 MG/DL
LDLC SERPL DIRECT ASSAY-MCNC: 91 MG/DL
LYMPHOCYTES # BLD AUTO: 1.03 K/UL
LYMPHOCYTES NFR BLD AUTO: 9.6 %
MAGNESIUM SERPL-MCNC: 2 MG/DL
MAN DIFF?: NORMAL
MCHC RBC-ENTMCNC: 27.4 PG
MCHC RBC-ENTMCNC: 31.6 GM/DL
MCV RBC AUTO: 86.6 FL
MICROALBUMIN 24H UR DL<=1MG/L-MCNC: 59 MG/DL
MICROALBUMIN/CREAT 24H UR-RTO: 887 MG/G
MONOCYTES # BLD AUTO: 1.07 K/UL
MONOCYTES NFR BLD AUTO: 10 %
NEUTROPHILS # BLD AUTO: 8.36 K/UL
NEUTROPHILS NFR BLD AUTO: 78 %
NONHDLC SERPL-MCNC: 123 MG/DL
PLATELET # BLD AUTO: 184 K/UL
POTASSIUM SERPL-SCNC: 4.1 MMOL/L
PROT SERPL-MCNC: 6.7 G/DL
RBC # BLD: 5.99 M/UL
RBC # FLD: 15.6 %
SODIUM SERPL-SCNC: 138 MMOL/L
TRIGL SERPL-MCNC: 164 MG/DL
TSH SERPL-ACNC: 1.25 UIU/ML
VIT B12 SERPL-MCNC: 623 PG/ML
WBC # FLD AUTO: 10.72 K/UL

## 2021-06-01 ENCOUNTER — RX RENEWAL (OUTPATIENT)
Age: 71
End: 2021-06-01

## 2021-06-16 ENCOUNTER — APPOINTMENT (OUTPATIENT)
Dept: CARDIOLOGY | Facility: CLINIC | Age: 71
End: 2021-06-16
Payer: COMMERCIAL

## 2021-06-16 ENCOUNTER — NON-APPOINTMENT (OUTPATIENT)
Age: 71
End: 2021-06-16

## 2021-06-16 VITALS
BODY MASS INDEX: 23.54 KG/M2 | HEIGHT: 67 IN | WEIGHT: 150 LBS | HEART RATE: 87 BPM | SYSTOLIC BLOOD PRESSURE: 142 MMHG | DIASTOLIC BLOOD PRESSURE: 101 MMHG | OXYGEN SATURATION: 95 %

## 2021-06-16 VITALS — SYSTOLIC BLOOD PRESSURE: 110 MMHG | DIASTOLIC BLOOD PRESSURE: 70 MMHG

## 2021-06-16 PROCEDURE — 99214 OFFICE O/P EST MOD 30 MIN: CPT

## 2021-06-16 PROCEDURE — 99072 ADDL SUPL MATRL&STAF TM PHE: CPT

## 2021-06-16 PROCEDURE — 93000 ELECTROCARDIOGRAM COMPLETE: CPT

## 2021-06-16 NOTE — PHYSICAL EXAM
[Normal Appearance] : normal appearance [Well Groomed] : well groomed [General Appearance - In No Acute Distress] : no acute distress [Normal Conjunctiva] : the conjunctiva exhibited no abnormalities [Eyelids - No Xanthelasma] : the eyelids demonstrated no xanthelasmas [No Oral Pallor] : no oral pallor [No Oral Cyanosis] : no oral cyanosis [Normal Jugular Venous V Waves Present] : normal jugular venous V waves present [Respiration, Rhythm And Depth] : normal respiratory rhythm and effort [Exaggerated Use Of Accessory Muscles For Inspiration] : no accessory muscle use [Auscultation Breath Sounds / Voice Sounds] : lungs were clear to auscultation bilaterally [Bowel Sounds] : normal bowel sounds [Abdomen Soft] : soft [Abdomen Tenderness] : non-tender [Nail Clubbing] : no clubbing of the fingernails [Cyanosis, Localized] : no localized cyanosis [Petechial Hemorrhages (___cm)] : no petechial hemorrhages [Skin Color & Pigmentation] : normal skin color and pigmentation [] : no rash [Oriented To Time, Place, And Person] : oriented to person, place, and time [Affect] : the affect was normal [Mood] : the mood was normal [No Anxiety] : not feeling anxious [Not Palpable] : not palpable [No Precordial Heave] : no precordial heave was noted [Normal Rate] : normal [Irregularly Irregular] : irregularly irregular [Normal S1] : normal S1 [Normal S2] : normal S2 [No Gallop] : no gallop heard [No Murmur] : no murmurs heard [2+] : left 2+ [No Abnormalities] : the abdominal aorta was not enlarged and no bruit was heard [No Pitting Edema] : no pitting edema present [FreeTextEntry1] : walking with a walker.  [Right Carotid Bruit] : no bruit heard over the right carotid [Left Carotid Bruit] : no bruit heard over the left carotid [Bruit] : no bruit heard

## 2021-06-16 NOTE — HISTORY OF PRESENT ILLNESS
[FreeTextEntry1] : 70 year old man with a history of permanent atrial fibrillation, HTN, HLD, allergic conjunctivitis who is here for a followup.  \par \par Since his last visit, he is still dealing with  diabetic neuropathy. He saw neurosurgery. He is pending a second opinion at [facility]. He has had 5 mechanical falls since last visit. \par \par He denies palpitations, dyspnea, PND, orthopnea, lower extremity edema, LOC. He has been taking his Eliquis as prescribed without any melena, excessive bleeding, bruising. He is compliant with his medications.   \par

## 2021-06-16 NOTE — DISCUSSION/SUMMARY
[FreeTextEntry1] : 71 year old man with HTN, HLD, DM and permanent atrial fibrillation who presents to the office for follow-up. \par \par Rex is still falling. He is undergoing a neurological workup. He is falling more.  He is still in AF. His heart rate is well controlled.  He will continue Atenolol 100mg Qday. At present, I see no benefit to restoring NSR. There are no signs or symptoms of abnormal bleeding, and he will continue Eliquis for stroke risk reduction. Though given his fall I think that the risk of being on AC is now increasing. He will see EP for a possible watchman. \par \par  He denies any anginal symptoms. Clinically he is euvolemic on exam. I will defer his stress test till other evaluation is complete. He had an echo in 7/2020 that showed normal systolic LV function without any significant other findings, including no significant valvular disease. \par  \par His blood pressure is controlled. He will continue his current regiment. He will continue Olmesartan, eplerenone, Norvasc, Hydralazine. He is off the HCTZ because of his Farxiga. He will continue Farxiga. \par \par His lipids are better controlled on Crestor QOD.  He is tolerating the Vascepa. His triglycerides have improved.\par \par Exercise, diet and lifestyle modification counseling was performed  to reduce his CV risk.  \par \par He will follow with me in 3 months. he will follow up with you fall of his other medical issues.

## 2021-07-12 ENCOUNTER — RX RENEWAL (OUTPATIENT)
Age: 71
End: 2021-07-12

## 2021-07-12 ENCOUNTER — APPOINTMENT (OUTPATIENT)
Dept: ELECTROPHYSIOLOGY | Facility: CLINIC | Age: 71
End: 2021-07-12

## 2021-08-02 ENCOUNTER — RX RENEWAL (OUTPATIENT)
Age: 71
End: 2021-08-02

## 2021-08-09 ENCOUNTER — APPOINTMENT (OUTPATIENT)
Dept: INTERNAL MEDICINE | Facility: CLINIC | Age: 71
End: 2021-08-09

## 2021-08-17 ENCOUNTER — APPOINTMENT (OUTPATIENT)
Dept: OPHTHALMOLOGY | Facility: CLINIC | Age: 71
End: 2021-08-17

## 2021-09-12 ENCOUNTER — RX RENEWAL (OUTPATIENT)
Age: 71
End: 2021-09-12

## 2021-09-13 ENCOUNTER — RX RENEWAL (OUTPATIENT)
Age: 71
End: 2021-09-13

## 2021-09-20 ENCOUNTER — NON-APPOINTMENT (OUTPATIENT)
Age: 71
End: 2021-09-20

## 2021-09-21 ENCOUNTER — NON-APPOINTMENT (OUTPATIENT)
Age: 71
End: 2021-09-21

## 2021-09-21 ENCOUNTER — APPOINTMENT (OUTPATIENT)
Dept: ENDOCRINOLOGY | Facility: CLINIC | Age: 71
End: 2021-09-21

## 2021-10-13 ENCOUNTER — APPOINTMENT (OUTPATIENT)
Dept: RHEUMATOLOGY | Facility: CLINIC | Age: 71
End: 2021-10-13

## 2021-12-03 ENCOUNTER — APPOINTMENT (OUTPATIENT)
Dept: NEPHROLOGY | Facility: CLINIC | Age: 71
End: 2021-12-03

## 2022-03-24 ENCOUNTER — RX CHANGE (OUTPATIENT)
Age: 72
End: 2022-03-24

## 2022-03-24 ENCOUNTER — APPOINTMENT (OUTPATIENT)
Dept: INTERNAL MEDICINE | Facility: CLINIC | Age: 72
End: 2022-03-24
Payer: COMMERCIAL

## 2022-03-24 PROCEDURE — 99495 TRANSJ CARE MGMT MOD F2F 14D: CPT

## 2022-03-24 RX ORDER — DILTIAZEM HYDROCHLORIDE 300 MG/1
300 TABLET, EXTENDED RELEASE ORAL DAILY
Qty: 30 | Refills: 5 | Status: DISCONTINUED | COMMUNITY
Start: 2022-03-24 | End: 2022-03-24

## 2022-03-24 NOTE — HISTORY OF PRESENT ILLNESS
[Home] : at home, [unfilled] , at the time of the visit. [Medical Office: ([facility])___] : at the medical office located in  [Verbal consent obtained from patient] : the patient, [unfilled] [Post-hospitalization from ___ Hospital] : Post-hospitalization from [unfilled] Hospital [Admitted on: ___] : The patient was admitted on [unfilled] [Discharged on ___] : discharged on [unfilled] [FreeTextEntry2] : Admitted aug 11 2021 disch Sept 17, 2021 to rehab\par Discharged from rehab several days ago\par Fel and struck head while on eliques\par Confined to wheelchair\par Can transfer with assistance\par \par need [place] lift\par unable to use bathroom and into diaper\par no skin breakdown\par Long term memory intack\par Short term meory poor\par Has VNS/home care roxana [place]\par Meds changed\par currently on short ascting cardizemm 60 mg 1 and 1/2 tab every 6 hours???\par List of meds to be forwarded

## 2022-03-24 NOTE — PHYSICAL EXAM
[86167 - Moderate Complexity requires multiple possible diagnoses and/or the management options, moderate complexity of the medical data (tests, etc.) to be reviewed, and moderate risk of significant complications, morbidity, and/or mortality as well as co] : Moderate Complexity

## 2022-03-24 NOTE — PLAN
[FreeTextEntry1] : Will review meds and adjust\par baseline new bloods\par Will work with home care/vns

## 2022-03-30 LAB
25(OH)D3 SERPL-MCNC: 34.7 NG/ML
ALBUMIN SERPL ELPH-MCNC: 3.9 G/DL
ALP BLD-CCNC: 88 U/L
ALT SERPL-CCNC: 12 U/L
ANION GAP SERPL CALC-SCNC: 25 MMOL/L
AST SERPL-CCNC: 15 U/L
BASOPHILS # BLD AUTO: 0.07 K/UL
BASOPHILS NFR BLD AUTO: 0.8 %
BILIRUB SERPL-MCNC: 0.3 MG/DL
BUN SERPL-MCNC: 17 MG/DL
CALCIUM SERPL-MCNC: 9.2 MG/DL
CHLORIDE SERPL-SCNC: 105 MMOL/L
CHOLEST SERPL-MCNC: 141 MG/DL
CO2 SERPL-SCNC: 15 MMOL/L
CREAT SERPL-MCNC: 0.85 MG/DL
EGFR: 93 ML/MIN/1.73M2
EOSINOPHIL # BLD AUTO: 0.28 K/UL
EOSINOPHIL NFR BLD AUTO: 3.3 %
ESTIMATED AVERAGE GLUCOSE: 128 MG/DL
FOLATE SERPL-MCNC: 10.9 NG/ML
GLUCOSE SERPL-MCNC: 132 MG/DL
HBA1C MFR BLD HPLC: 6.1 %
HCT VFR BLD CALC: 43.2 %
HDLC SERPL-MCNC: 46 MG/DL
HGB BLD-MCNC: 13.2 G/DL
IMM GRANULOCYTES NFR BLD AUTO: 0.9 %
LDLC SERPL CALC-MCNC: 47 MG/DL
LYMPHOCYTES # BLD AUTO: 0.94 K/UL
LYMPHOCYTES NFR BLD AUTO: 10.9 %
MAN DIFF?: NORMAL
MCHC RBC-ENTMCNC: 26.5 PG
MCHC RBC-ENTMCNC: 30.6 GM/DL
MCV RBC AUTO: 86.6 FL
MONOCYTES # BLD AUTO: 0.58 K/UL
MONOCYTES NFR BLD AUTO: 6.7 %
NEUTROPHILS # BLD AUTO: 6.65 K/UL
NEUTROPHILS NFR BLD AUTO: 77.4 %
NONHDLC SERPL-MCNC: 95 MG/DL
NT-PROBNP SERPL-MCNC: 803 PG/ML
PLATELET # BLD AUTO: 184 K/UL
POTASSIUM SERPL-SCNC: 4.4 MMOL/L
PROT SERPL-MCNC: 5.8 G/DL
RBC # BLD: 4.99 M/UL
RBC # FLD: 16.3 %
SODIUM SERPL-SCNC: 145 MMOL/L
T4 FREE SERPL-MCNC: 1.1 NG/DL
TRIGL SERPL-MCNC: 241 MG/DL
TSH SERPL-ACNC: 0.9 UIU/ML
VIT B12 SERPL-MCNC: 448 PG/ML
WBC # FLD AUTO: 8.6 K/UL

## 2022-03-30 RX ORDER — GABAPENTIN 600 MG/1
600 TABLET, COATED ORAL
Qty: 120 | Refills: 5 | Status: DISCONTINUED | COMMUNITY
Start: 2020-11-03 | End: 2022-03-30

## 2022-03-30 RX ORDER — OMEPRAZOLE 20 MG/1
20 CAPSULE, DELAYED RELEASE ORAL
Qty: 30 | Refills: 0 | Status: DISCONTINUED | COMMUNITY
Start: 2018-07-06 | End: 2022-03-30

## 2022-03-30 RX ORDER — CHLORHEXIDINE GLUCONATE 4 %
400 (240 MG) LIQUID (ML) TOPICAL
Qty: 180 | Refills: 3 | Status: DISCONTINUED | COMMUNITY
Start: 2020-06-29 | End: 2022-03-30

## 2022-03-30 RX ORDER — MUPIROCIN 20 MG/G
2 OINTMENT TOPICAL
Qty: 22 | Refills: 0 | Status: DISCONTINUED | COMMUNITY
Start: 2018-05-23 | End: 2022-03-30

## 2022-03-30 RX ORDER — DULOXETINE HYDROCHLORIDE 60 MG/1
60 CAPSULE, DELAYED RELEASE PELLETS ORAL
Qty: 90 | Refills: 0 | Status: DISCONTINUED | COMMUNITY
Start: 2021-01-15 | End: 2022-03-30

## 2022-03-30 RX ORDER — DAPAGLIFLOZIN 5 MG/1
5 TABLET, FILM COATED ORAL
Qty: 90 | Refills: 3 | Status: DISCONTINUED | COMMUNITY
Start: 2021-01-20 | End: 2022-03-30

## 2022-03-30 RX ORDER — APIXABAN 5 MG/1
5 TABLET, FILM COATED ORAL
Qty: 180 | Refills: 3 | Status: DISCONTINUED | COMMUNITY
Start: 2017-01-05 | End: 2022-03-30

## 2022-03-30 RX ORDER — ICOSAPENT ETHYL 1 G/1
1 CAPSULE ORAL
Qty: 360 | Refills: 3 | Status: DISCONTINUED | COMMUNITY
Start: 2020-06-29 | End: 2022-03-30

## 2022-03-30 RX ORDER — OLMESARTAN MEDOXOMIL 40 MG/1
40 TABLET, FILM COATED ORAL
Qty: 90 | Refills: 3 | Status: DISCONTINUED | COMMUNITY
Start: 2020-03-18 | End: 2022-03-30

## 2022-03-30 RX ORDER — MAGNESIUM OXIDE 241.3 MG/1000MG
400 TABLET ORAL
Qty: 180 | Refills: 0 | Status: DISCONTINUED | COMMUNITY
Start: 2020-06-29 | End: 2022-03-30

## 2022-03-30 RX ORDER — MECLIZINE HYDROCHLORIDE 12.5 MG/1
12.5 TABLET ORAL 3 TIMES DAILY
Qty: 90 | Refills: 5 | Status: DISCONTINUED | COMMUNITY
Start: 2020-12-01 | End: 2022-03-30

## 2022-04-11 PROBLEM — Z11.59 SCREENING FOR VIRAL DISEASE: Status: ACTIVE | Noted: 2020-06-10

## 2022-04-18 ENCOUNTER — RX RENEWAL (OUTPATIENT)
Age: 72
End: 2022-04-18

## 2022-04-18 ENCOUNTER — APPOINTMENT (OUTPATIENT)
Dept: CARDIOLOGY | Facility: CLINIC | Age: 72
End: 2022-04-18
Payer: COMMERCIAL

## 2022-04-18 ENCOUNTER — NON-APPOINTMENT (OUTPATIENT)
Age: 72
End: 2022-04-18

## 2022-04-18 VITALS — DIASTOLIC BLOOD PRESSURE: 87 MMHG | SYSTOLIC BLOOD PRESSURE: 123 MMHG | HEART RATE: 94 BPM | OXYGEN SATURATION: 100 %

## 2022-04-18 PROCEDURE — 93000 ELECTROCARDIOGRAM COMPLETE: CPT

## 2022-04-18 PROCEDURE — 99215 OFFICE O/P EST HI 40 MIN: CPT

## 2022-04-22 NOTE — DISCUSSION/SUMMARY
[FreeTextEntry1] : 72 year old man with HTN, HLD, DM and permanent atrial fibrillation who presents to the office for follow-up. \par \par Rex had a long and complicated course recently at [facility].  Given his intracranial bleeding he is off of anticoagulation for over 6 months.  I spoke to him and his wife at length about risk benefit of being off of anticoagulation.  At this time I have advised him to seek a neurological or neurosurgical opinion on whether or not I can at least resume aspirin or resume full dose anticoagulation.  If there is a possibility of short-term full dose anticoagulation I still think he is a good candidate for a watchman.\par \par  He denies any anginal symptoms. Clinically he is euvolemic on exam.  He will need a repeat echocardiogram in the near future.  When he is better recovered we will consider repeat ischemic testing.\par  \par He is on antihypertensive therapy has changed.  He is still in rate controlled A. fib.  He will continue with diltiazem 300 mg daily.  He will continue with hydralazine 50 mg every 12.  He will continue with eplerenone 25 mg daily and losartan 100 mg daily.  I have counseled them on assessing for orthostatic symptoms given that functionally he is a paraplegic at this time.\par \par He will continue his Crestor and Vascepa for mixed hyperlipidemia.\par \par Exercise, diet and lifestyle modification counseling was performed  to reduce his CV risk.  \par \par He will follow with me in 3 months. he will follow up with you fall of his other medical issues.

## 2022-04-22 NOTE — PHYSICAL EXAM
[Well Groomed] : well groomed [General Appearance - In No Acute Distress] : no acute distress [Normal Conjunctiva] : the conjunctiva exhibited no abnormalities [Eyelids - No Xanthelasma] : the eyelids demonstrated no xanthelasmas [Normal Oral Mucosa] : normal oral mucosa [No Oral Pallor] : no oral pallor [No Oral Cyanosis] : no oral cyanosis [Normal Jugular Venous V Waves Present] : normal jugular venous V waves present [Respiration, Rhythm And Depth] : normal respiratory rhythm and effort [Exaggerated Use Of Accessory Muscles For Inspiration] : no accessory muscle use [Auscultation Breath Sounds / Voice Sounds] : lungs were clear to auscultation bilaterally [Bowel Sounds] : normal bowel sounds [Abdomen Soft] : soft [Abdomen Tenderness] : non-tender [Nail Clubbing] : no clubbing of the fingernails [Cyanosis, Localized] : no localized cyanosis [Petechial Hemorrhages (___cm)] : no petechial hemorrhages [Skin Color & Pigmentation] : normal skin color and pigmentation [] : no rash [Oriented To Time, Place, And Person] : oriented to person, place, and time [Affect] : the affect was normal [Mood] : the mood was normal [No Anxiety] : not feeling anxious [Not Palpable] : not palpable [No Precordial Heave] : no precordial heave was noted [Normal Rate] : normal [Irregularly Irregular] : irregularly irregular [Normal S1] : normal S1 [Normal S2] : normal S2 [No Gallop] : no gallop heard [No Murmur] : no murmurs heard [2+] : left 2+ [No Abnormalities] : the abdominal aorta was not enlarged and no bruit was heard [No Pitting Edema] : no pitting edema present [FreeTextEntry1] :  in wheelchair [Right Carotid Bruit] : no bruit heard over the right carotid [Left Carotid Bruit] : no bruit heard over the left carotid [Bruit] : no bruit heard

## 2022-04-22 NOTE — CARDIOLOGY SUMMARY
[de-identified] : poor baseline\par Atrial  fibrillation  \par LAFB\par nonspecific T wave changes\par  [de-identified] : 7/02/20 nromal LV function mild LVH

## 2022-04-22 NOTE — HISTORY OF PRESENT ILLNESS
[FreeTextEntry1] : 72 year old man with a history of atrial fibrillation, SDH HTN, HLD, allergic conjunctivitis, \par \par Since his last visit he was taken to [place] after a mechanical fall and had a SDH. He was intubated for a brief time. He was successfully extubated. Per his wife he has was noted to have a CVA. He went to [facility] TBI rehab. He was there for about 7 months. he has been home for a month. \par \par He is now here for a followup.   \par He has now been primary in the wheelchair. He is doing PT about 2 times week.  His wife says that he is very stiff.  He denies any significant dizziness when standing or doing his physical therapy.\par He denies palpitations, dyspnea, PND, orthopnea, lower extremity edema, LOC. he has been off of eliquis No reported melena, hematochezia or hematemesis. \par

## 2022-05-11 ENCOUNTER — RX CHANGE (OUTPATIENT)
Age: 72
End: 2022-05-11

## 2022-05-11 RX ORDER — BUPROPION HYDROCHLORIDE 75 MG/1
75 TABLET, FILM COATED ORAL
Qty: 60 | Refills: 5 | Status: DISCONTINUED | COMMUNITY
Start: 2022-04-18 | End: 2022-05-11

## 2022-05-22 ENCOUNTER — EMERGENCY (EMERGENCY)
Facility: HOSPITAL | Age: 72
LOS: 1 days | Discharge: HOME | End: 2022-05-22
Attending: EMERGENCY MEDICINE | Admitting: EMERGENCY MEDICINE
Payer: COMMERCIAL

## 2022-05-22 VITALS — TEMPERATURE: 98 F

## 2022-05-22 VITALS
WEIGHT: 145.06 LBS | SYSTOLIC BLOOD PRESSURE: 106 MMHG | HEART RATE: 84 BPM | OXYGEN SATURATION: 96 % | RESPIRATION RATE: 16 BRPM | DIASTOLIC BLOOD PRESSURE: 66 MMHG

## 2022-05-22 LAB
ALBUMIN SERPL ELPH-MCNC: 3.1 G/DL — LOW (ref 3.3–5)
ALP SERPL-CCNC: 128 U/L — HIGH (ref 40–120)
ALT FLD-CCNC: 33 U/L — SIGNIFICANT CHANGE UP (ref 12–78)
ANION GAP SERPL CALC-SCNC: 7 MMOL/L — SIGNIFICANT CHANGE UP (ref 5–17)
APTT BLD: 28 SEC — SIGNIFICANT CHANGE UP (ref 27.5–35.5)
AST SERPL-CCNC: 11 U/L — LOW (ref 15–37)
BASOPHILS # BLD AUTO: 0.05 K/UL — SIGNIFICANT CHANGE UP (ref 0–0.2)
BASOPHILS NFR BLD AUTO: 0.3 % — SIGNIFICANT CHANGE UP (ref 0–2)
BILIRUB SERPL-MCNC: 0.4 MG/DL — SIGNIFICANT CHANGE UP (ref 0.2–1.2)
BUN SERPL-MCNC: 23 MG/DL — SIGNIFICANT CHANGE UP (ref 7–23)
CALCIUM SERPL-MCNC: 8.6 MG/DL — SIGNIFICANT CHANGE UP (ref 8.5–10.1)
CHLORIDE SERPL-SCNC: 109 MMOL/L — HIGH (ref 96–108)
CK SERPL-CCNC: 32 U/L — SIGNIFICANT CHANGE UP (ref 26–308)
CO2 SERPL-SCNC: 26 MMOL/L — SIGNIFICANT CHANGE UP (ref 22–31)
CREAT SERPL-MCNC: 1 MG/DL — SIGNIFICANT CHANGE UP (ref 0.5–1.3)
EGFR: 81 ML/MIN/1.73M2 — SIGNIFICANT CHANGE UP
EOSINOPHIL # BLD AUTO: 0.15 K/UL — SIGNIFICANT CHANGE UP (ref 0–0.5)
EOSINOPHIL NFR BLD AUTO: 1 % — SIGNIFICANT CHANGE UP (ref 0–6)
FLUAV AG NPH QL: SIGNIFICANT CHANGE UP
FLUBV AG NPH QL: SIGNIFICANT CHANGE UP
GLUCOSE SERPL-MCNC: 216 MG/DL — HIGH (ref 70–99)
HCT VFR BLD CALC: 41.7 % — SIGNIFICANT CHANGE UP (ref 39–50)
HGB BLD-MCNC: 13.5 G/DL — SIGNIFICANT CHANGE UP (ref 13–17)
IMM GRANULOCYTES NFR BLD AUTO: 0.9 % — SIGNIFICANT CHANGE UP (ref 0–1.5)
INR BLD: 1 RATIO — SIGNIFICANT CHANGE UP (ref 0.88–1.16)
LYMPHOCYTES # BLD AUTO: 0.76 K/UL — LOW (ref 1–3.3)
LYMPHOCYTES # BLD AUTO: 5 % — LOW (ref 13–44)
MCHC RBC-ENTMCNC: 26.8 PG — LOW (ref 27–34)
MCHC RBC-ENTMCNC: 32.4 GM/DL — SIGNIFICANT CHANGE UP (ref 32–36)
MCV RBC AUTO: 82.9 FL — SIGNIFICANT CHANGE UP (ref 80–100)
MONOCYTES # BLD AUTO: 0.93 K/UL — HIGH (ref 0–0.9)
MONOCYTES NFR BLD AUTO: 6.2 % — SIGNIFICANT CHANGE UP (ref 2–14)
NEUTROPHILS # BLD AUTO: 13.03 K/UL — HIGH (ref 1.8–7.4)
NEUTROPHILS NFR BLD AUTO: 86.6 % — HIGH (ref 43–77)
NRBC # BLD: 0 /100 WBCS — SIGNIFICANT CHANGE UP (ref 0–0)
PLATELET # BLD AUTO: 185 K/UL — SIGNIFICANT CHANGE UP (ref 150–400)
POTASSIUM SERPL-MCNC: 4 MMOL/L — SIGNIFICANT CHANGE UP (ref 3.5–5.3)
POTASSIUM SERPL-SCNC: 4 MMOL/L — SIGNIFICANT CHANGE UP (ref 3.5–5.3)
PROT SERPL-MCNC: 6.2 G/DL — SIGNIFICANT CHANGE UP (ref 6–8.3)
PROTHROM AB SERPL-ACNC: 11.7 SEC — SIGNIFICANT CHANGE UP (ref 10.5–13.4)
RBC # BLD: 5.03 M/UL — SIGNIFICANT CHANGE UP (ref 4.2–5.8)
RBC # FLD: 14.9 % — HIGH (ref 10.3–14.5)
RSV RNA NPH QL NAA+NON-PROBE: SIGNIFICANT CHANGE UP
SARS-COV-2 RNA SPEC QL NAA+PROBE: SIGNIFICANT CHANGE UP
SODIUM SERPL-SCNC: 142 MMOL/L — SIGNIFICANT CHANGE UP (ref 135–145)
TROPONIN I, HIGH SENSITIVITY RESULT: 4.3 NG/L — SIGNIFICANT CHANGE UP
WBC # BLD: 15.06 K/UL — HIGH (ref 3.8–10.5)
WBC # FLD AUTO: 15.06 K/UL — HIGH (ref 3.8–10.5)

## 2022-05-22 PROCEDURE — 87637 SARSCOV2&INF A&B&RSV AMP PRB: CPT

## 2022-05-22 PROCEDURE — 80053 COMPREHEN METABOLIC PANEL: CPT

## 2022-05-22 PROCEDURE — 99285 EMERGENCY DEPT VISIT HI MDM: CPT

## 2022-05-22 PROCEDURE — 71045 X-RAY EXAM CHEST 1 VIEW: CPT | Mod: 26

## 2022-05-22 PROCEDURE — 84484 ASSAY OF TROPONIN QUANT: CPT

## 2022-05-22 PROCEDURE — 36415 COLL VENOUS BLD VENIPUNCTURE: CPT

## 2022-05-22 PROCEDURE — 71045 X-RAY EXAM CHEST 1 VIEW: CPT

## 2022-05-22 PROCEDURE — 85025 COMPLETE CBC W/AUTO DIFF WBC: CPT

## 2022-05-22 PROCEDURE — 93010 ELECTROCARDIOGRAM REPORT: CPT

## 2022-05-22 PROCEDURE — 96360 HYDRATION IV INFUSION INIT: CPT

## 2022-05-22 PROCEDURE — 82550 ASSAY OF CK (CPK): CPT

## 2022-05-22 PROCEDURE — 93005 ELECTROCARDIOGRAM TRACING: CPT

## 2022-05-22 PROCEDURE — 99285 EMERGENCY DEPT VISIT HI MDM: CPT | Mod: 25

## 2022-05-22 PROCEDURE — 85730 THROMBOPLASTIN TIME PARTIAL: CPT

## 2022-05-22 PROCEDURE — 96361 HYDRATE IV INFUSION ADD-ON: CPT

## 2022-05-22 PROCEDURE — 85610 PROTHROMBIN TIME: CPT

## 2022-05-22 PROCEDURE — 93010 ELECTROCARDIOGRAM REPORT: CPT | Mod: 77

## 2022-05-22 RX ORDER — SODIUM CHLORIDE 9 MG/ML
1000 INJECTION INTRAMUSCULAR; INTRAVENOUS; SUBCUTANEOUS
Refills: 0 | Status: COMPLETED | OUTPATIENT
Start: 2022-05-22 | End: 2022-05-22

## 2022-05-22 RX ADMIN — SODIUM CHLORIDE 1000 MILLILITER(S): 9 INJECTION INTRAMUSCULAR; INTRAVENOUS; SUBCUTANEOUS at 16:08

## 2022-05-22 RX ADMIN — SODIUM CHLORIDE 1000 MILLILITER(S): 9 INJECTION INTRAMUSCULAR; INTRAVENOUS; SUBCUTANEOUS at 17:00

## 2022-05-22 RX ADMIN — SODIUM CHLORIDE 1000 MILLILITER(S): 9 INJECTION INTRAMUSCULAR; INTRAVENOUS; SUBCUTANEOUS at 13:28

## 2022-05-22 RX ADMIN — SODIUM CHLORIDE 1000 MILLILITER(S): 9 INJECTION INTRAMUSCULAR; INTRAVENOUS; SUBCUTANEOUS at 15:29

## 2022-05-22 NOTE — ED PROVIDER NOTE - PROGRESS NOTE DETAILS
Pt doing well, feeling much improved. No further hypotension - no acute co or changes    Discussed the results of all diagnostic testing in ED and copies of all reports given.   An opportunity to ask questions was given.  Discussed the importance of prompt, close medical follow-up.  Patient will return with any changes, concerns or persistent / worsening symptoms.  Understanding of all instructions verbalized.

## 2022-05-22 NOTE — ED PROVIDER NOTE - PATIENT PORTAL LINK FT
You can access the FollowMyHealth Patient Portal offered by [facility] by registering at the following website: [email or web address]. By joining [facility]’s FollowMyHealth portal, you will also be able to view your health information using other applications (apps) compatible with our system.

## 2022-05-22 NOTE — ED ADULT TRIAGE NOTE - CHIEF COMPLAINT QUOTE
Pt BIB EMS after syncopal episode at home. per EMS pt was sitting outside in heat in wheelchair. unresponsive approximately 5 mins.

## 2022-05-22 NOTE — ED PROVIDER NOTE - CARE PROVIDER_API CALL
Richard Mathis (MD)  Gastroenterology; Internal Medicine  [address]  [place] [address]  Phone: [phone]  Fax: [phone]  Follow Up Time:

## 2022-05-22 NOTE — ED PROVIDER NOTE - OBJECTIVE STATEMENT
71 yo M p/w went to sit outside today and was out for > 30 min when he began to feel light headed. Family states he was kind of going in / out at home and they called EMS. EMS found SBP ~ 60, given IVF with improvement. Pt now feeling well. no cp/sob/palp. no cough/ uri. no neck/ back pain. no covid exposures. Pt with chronic disabled, co some slight wounds on buttocks. No discharge. no fever/chills. no agg/allev factors. No other acute co or changes. 71 yo M w/ hx HTN, DM, Afib p/w went to sit outside today and was out for > 30 min when he began to feel light headed. Family states he was kind of going in / out at home and they called EMS. EMS found SBP ~ 60, given IVF with improvement. Pt now feeling well. no cp/sob/palp. no cough/ uri. no neck/ back pain. no covid exposures. Pt with chronic disabled, co some slight wounds on buttocks. No discharge. no fever/chills. no agg/allev factors. No other acute co or changes.

## 2022-05-23 ENCOUNTER — NON-APPOINTMENT (OUTPATIENT)
Age: 72
End: 2022-05-23

## 2022-05-31 DIAGNOSIS — R26.81 UNSTEADINESS ON FEET: ICD-10-CM

## 2022-06-07 ENCOUNTER — APPOINTMENT (OUTPATIENT)
Dept: CARDIOLOGY | Facility: CLINIC | Age: 72
End: 2022-06-07
Payer: COMMERCIAL

## 2022-06-07 ENCOUNTER — NON-APPOINTMENT (OUTPATIENT)
Age: 72
End: 2022-06-07

## 2022-06-07 VITALS
HEIGHT: 67 IN | HEART RATE: 74 BPM | WEIGHT: 148 LBS | BODY MASS INDEX: 23.23 KG/M2 | OXYGEN SATURATION: 93 % | DIASTOLIC BLOOD PRESSURE: 80 MMHG | SYSTOLIC BLOOD PRESSURE: 121 MMHG

## 2022-06-07 DIAGNOSIS — G62.9 POLYNEUROPATHY, UNSPECIFIED: ICD-10-CM

## 2022-06-07 DIAGNOSIS — N39.42 INCONTINENCE W/OUT SENSORY AWARENESS: ICD-10-CM

## 2022-06-07 PROCEDURE — 93000 ELECTROCARDIOGRAM COMPLETE: CPT

## 2022-06-07 PROCEDURE — 99215 OFFICE O/P EST HI 40 MIN: CPT

## 2022-06-07 NOTE — DISCUSSION/SUMMARY
[FreeTextEntry1] : 72 year old man with HTN, HLD, DM and permanent atrial fibrillation who presents to the office for follow-up. \par \par Rex's neurosurgeon has cleared him to resume AC given resolution of his SDH. He will resume Eliquis 2.5mg q12. He will stop his ASA. Likely in the future he will need to increase his eliquis to  mg q12. He will need to see EP to assess for watchman. \par \par He denies any anginal symptoms. Clinically he is euvolemic on exam.\par \par I am concerned he has underlying amyloid given his neuropathy and other medical conditions. He has known mild LVH and ?low voltage on his EKG.  he will check SPEP. UpEP and free light chains to rule out AL.  He will need a repeat echocardiogram with strain imaging.  When he is better recovered we will consider repeat ischemic testing.\par  \par His blood pressure and heart rate are controlled. He had an episode of vagal syncope. Will monitor for more symptoms.  He is still in rate controlled A. fib.  He will continue with diltiazem 300 mg daily.  He will continue with hydralazine 50 mg every 12.  He will continue with eplerenone 25 mg daily and losartan 100 mg daily.  I have counseled them on assessing for orthostatic symptoms given that functionally he is a paraplegic at this time. If his Bp is low will stop hydralazine. \par \par He will continue his Crestor and Vascepa for mixed hyperlipidemia.\par \par Exercise, diet and lifestyle modification counseling was performed  to reduce his CV risk.  \par \par He will follow with me in 3 months. he will follow up with you fall of his other medical issues.

## 2022-06-07 NOTE — HISTORY OF PRESENT ILLNESS
[FreeTextEntry1] : 72 year old man with a history of atrial fibrillation, SDH HTN, HLD, allergic conjunctivitis, neuropathy. \par He was taken to [place] after a mechanical fall and had a SDH. He was intubated for a brief time. He was successfully extubated. Per his wife he has was noted to have a CVA. He went to [facility] TBI rehab. He was there for about 7 months. he has been home for a month. \par \par He is now here for a followup.   \par He recently had a syncopal episode in which he was sitting in sun eating and then had LOC. He was noted to have a low BP and taken to [place]. He was given IVF and was told he vagalled. He has not had any further episodes. \par He has now been primary in the wheelchair. He still cannot stand on his own. he notes that the neuropathy has not helped him. He denies any significant dizziness when standing or doing his physical therapy.\par He denies palpitations, dyspnea, PND, orthopnea, lower extremity edema, LOC.  No reported melena, hematochezia or hematemesis. \par

## 2022-06-07 NOTE — CARDIOLOGY SUMMARY
[de-identified] : poor baseline\par Atrial  fibrillation  \par LAFB low voltage. \par nonspecific T wave changes\par  [de-identified] : 7/02/20 nromal LV function mild LVH

## 2022-06-08 ENCOUNTER — LABORATORY RESULT (OUTPATIENT)
Age: 72
End: 2022-06-08

## 2022-06-09 ENCOUNTER — LABORATORY RESULT (OUTPATIENT)
Age: 72
End: 2022-06-09

## 2022-06-14 ENCOUNTER — NON-APPOINTMENT (OUTPATIENT)
Age: 72
End: 2022-06-14

## 2022-06-14 ENCOUNTER — INPATIENT (INPATIENT)
Facility: HOSPITAL | Age: 72
LOS: 8 days | Discharge: HOME | DRG: 178 | End: 2022-06-23
Attending: INTERNAL MEDICINE | Admitting: INTERNAL MEDICINE
Payer: COMMERCIAL

## 2022-06-14 VITALS
OXYGEN SATURATION: 98 % | DIASTOLIC BLOOD PRESSURE: 90 MMHG | SYSTOLIC BLOOD PRESSURE: 150 MMHG | HEART RATE: 120 BPM | RESPIRATION RATE: 18 BRPM | TEMPERATURE: 101 F

## 2022-06-14 DIAGNOSIS — E11.9 TYPE 2 DIABETES MELLITUS WITHOUT COMPLICATIONS: ICD-10-CM

## 2022-06-14 DIAGNOSIS — I48.91 UNSPECIFIED ATRIAL FIBRILLATION: ICD-10-CM

## 2022-06-14 DIAGNOSIS — I10 ESSENTIAL (PRIMARY) HYPERTENSION: ICD-10-CM

## 2022-06-14 DIAGNOSIS — U07.1 COVID-19: ICD-10-CM

## 2022-06-14 DIAGNOSIS — F32.9 MAJOR DEPRESSIVE DISORDER, SINGLE EPISODE, UNSPECIFIED: ICD-10-CM

## 2022-06-14 DIAGNOSIS — G62.9 POLYNEUROPATHY, UNSPECIFIED: ICD-10-CM

## 2022-06-14 DIAGNOSIS — Z29.9 ENCOUNTER FOR PROPHYLACTIC MEASURES, UNSPECIFIED: ICD-10-CM

## 2022-06-14 DIAGNOSIS — I25.10 ATHEROSCLEROTIC HEART DISEASE OF NATIVE CORONARY ARTERY WITHOUT ANGINA PECTORIS: ICD-10-CM

## 2022-06-14 DIAGNOSIS — N40.0 BENIGN PROSTATIC HYPERPLASIA WITHOUT LOWER URINARY TRACT SYMPTOMS: ICD-10-CM

## 2022-06-14 DIAGNOSIS — M62.838 OTHER MUSCLE SPASM: ICD-10-CM

## 2022-06-14 DIAGNOSIS — K59.00 CONSTIPATION, UNSPECIFIED: ICD-10-CM

## 2022-06-14 DIAGNOSIS — S06.9X9A UNSPECIFIED INTRACRANIAL INJURY WITH LOSS OF CONSCIOUSNESS OF UNSPECIFIED DURATION, INITIAL ENCOUNTER: ICD-10-CM

## 2022-06-14 DIAGNOSIS — E78.5 HYPERLIPIDEMIA, UNSPECIFIED: ICD-10-CM

## 2022-06-14 LAB
ALBUMIN SERPL ELPH-MCNC: 3.1 G/DL — LOW (ref 3.3–5)
ALBUMIN SERPL ELPH-MCNC: 3.6 G/DL — SIGNIFICANT CHANGE UP (ref 3.3–5)
ALP SERPL-CCNC: 111 U/L — SIGNIFICANT CHANGE UP (ref 40–120)
ALP SERPL-CCNC: 96 U/L — SIGNIFICANT CHANGE UP (ref 40–120)
ALT FLD-CCNC: 18 U/L — SIGNIFICANT CHANGE UP (ref 12–78)
ALT FLD-CCNC: 22 U/L — SIGNIFICANT CHANGE UP (ref 12–78)
ANION GAP SERPL CALC-SCNC: 8 MMOL/L — SIGNIFICANT CHANGE UP (ref 5–17)
APTT BLD: 37 SEC — HIGH (ref 27.5–35.5)
AST SERPL-CCNC: 11 U/L — LOW (ref 15–37)
AST SERPL-CCNC: 9 U/L — LOW (ref 15–37)
BASOPHILS # BLD AUTO: 0 K/UL — SIGNIFICANT CHANGE UP (ref 0–0.2)
BASOPHILS NFR BLD AUTO: 0 % — SIGNIFICANT CHANGE UP (ref 0–2)
BILIRUB DIRECT SERPL-MCNC: 0.2 MG/DL — SIGNIFICANT CHANGE UP (ref 0–0.3)
BILIRUB INDIRECT FLD-MCNC: 0.3 MG/DL — SIGNIFICANT CHANGE UP (ref 0.2–1)
BILIRUB SERPL-MCNC: 0.5 MG/DL — SIGNIFICANT CHANGE UP (ref 0.2–1.2)
BILIRUB SERPL-MCNC: 0.5 MG/DL — SIGNIFICANT CHANGE UP (ref 0.2–1.2)
BUN SERPL-MCNC: 18 MG/DL — SIGNIFICANT CHANGE UP (ref 7–23)
CALCIUM SERPL-MCNC: 8.8 MG/DL — SIGNIFICANT CHANGE UP (ref 8.5–10.1)
CHLORIDE SERPL-SCNC: 106 MMOL/L — SIGNIFICANT CHANGE UP (ref 96–108)
CO2 SERPL-SCNC: 26 MMOL/L — SIGNIFICANT CHANGE UP (ref 22–31)
CREAT SERPL-MCNC: 0.99 MG/DL — SIGNIFICANT CHANGE UP (ref 0.5–1.3)
CREAT SERPL-MCNC: 1.2 MG/DL — SIGNIFICANT CHANGE UP (ref 0.5–1.3)
CRP SERPL-MCNC: 65 MG/L — HIGH
D DIMER BLD IA.RAPID-MCNC: <150 NG/ML DDU — SIGNIFICANT CHANGE UP
EGFR: 65 ML/MIN/1.73M2 — SIGNIFICANT CHANGE UP
EGFR: 82 ML/MIN/1.73M2 — SIGNIFICANT CHANGE UP
EOSINOPHIL # BLD AUTO: 0.11 K/UL — SIGNIFICANT CHANGE UP (ref 0–0.5)
EOSINOPHIL NFR BLD AUTO: 1 % — SIGNIFICANT CHANGE UP (ref 0–6)
ERYTHROCYTE [SEDIMENTATION RATE] IN BLOOD: 2 MM/HR — SIGNIFICANT CHANGE UP (ref 0–20)
GLUCOSE SERPL-MCNC: 154 MG/DL — HIGH (ref 70–99)
HCT VFR BLD CALC: 45.7 % — SIGNIFICANT CHANGE UP (ref 39–50)
HGB BLD-MCNC: 14.7 G/DL — SIGNIFICANT CHANGE UP (ref 13–17)
INR BLD: 1.26 RATIO — HIGH (ref 0.88–1.16)
INR BLD: 1.3 RATIO — HIGH (ref 0.88–1.16)
LACTATE SERPL-SCNC: 1.3 MMOL/L — SIGNIFICANT CHANGE UP (ref 0.7–2)
LYMPHOCYTES # BLD AUTO: 0.8 K/UL — LOW (ref 1–3.3)
LYMPHOCYTES # BLD AUTO: 7 % — LOW (ref 13–44)
MCHC RBC-ENTMCNC: 26.3 PG — LOW (ref 27–34)
MCHC RBC-ENTMCNC: 32.2 GM/DL — SIGNIFICANT CHANGE UP (ref 32–36)
MCV RBC AUTO: 81.6 FL — SIGNIFICANT CHANGE UP (ref 80–100)
MONOCYTES # BLD AUTO: 0.8 K/UL — SIGNIFICANT CHANGE UP (ref 0–0.9)
MONOCYTES NFR BLD AUTO: 7 % — SIGNIFICANT CHANGE UP (ref 2–14)
NEUTROPHILS # BLD AUTO: 9.72 K/UL — HIGH (ref 1.8–7.4)
NEUTROPHILS NFR BLD AUTO: 83 % — HIGH (ref 43–77)
NRBC # BLD: SIGNIFICANT CHANGE UP /100 WBCS (ref 0–0)
PLATELET # BLD AUTO: 159 K/UL — SIGNIFICANT CHANGE UP (ref 150–400)
POTASSIUM SERPL-MCNC: 4.3 MMOL/L — SIGNIFICANT CHANGE UP (ref 3.5–5.3)
POTASSIUM SERPL-SCNC: 4.3 MMOL/L — SIGNIFICANT CHANGE UP (ref 3.5–5.3)
PROCALCITONIN SERPL-MCNC: 0.11 NG/ML — HIGH (ref 0–0.04)
PROT SERPL-MCNC: 6 G/DL — SIGNIFICANT CHANGE UP (ref 6–8.3)
PROT SERPL-MCNC: 6.8 G/DL — SIGNIFICANT CHANGE UP (ref 6–8.3)
PROTHROM AB SERPL-ACNC: 14.8 SEC — HIGH (ref 10.5–13.4)
PROTHROM AB SERPL-ACNC: 15.3 SEC — HIGH (ref 10.5–13.4)
RAPID RVP RESULT: DETECTED
RBC # BLD: 5.6 M/UL — SIGNIFICANT CHANGE UP (ref 4.2–5.8)
RBC # FLD: 15.4 % — HIGH (ref 10.3–14.5)
SARS-COV-2 RNA SPEC QL NAA+PROBE: DETECTED
SARS-COV-2 RNA SPEC QL NAA+PROBE: DETECTED
SODIUM SERPL-SCNC: 140 MMOL/L — SIGNIFICANT CHANGE UP (ref 135–145)
WBC # BLD: 11.44 K/UL — HIGH (ref 3.8–10.5)
WBC # FLD AUTO: 11.44 K/UL — HIGH (ref 3.8–10.5)

## 2022-06-14 PROCEDURE — 70450 CT HEAD/BRAIN W/O DYE: CPT | Mod: 26,MA

## 2022-06-14 PROCEDURE — 71045 X-RAY EXAM CHEST 1 VIEW: CPT | Mod: 26

## 2022-06-14 PROCEDURE — 99285 EMERGENCY DEPT VISIT HI MDM: CPT

## 2022-06-14 PROCEDURE — 99291 CRITICAL CARE FIRST HOUR: CPT

## 2022-06-14 RX ORDER — LOSARTAN POTASSIUM 100 MG/1
100 TABLET, FILM COATED ORAL DAILY
Refills: 0 | Status: DISCONTINUED | OUTPATIENT
Start: 2022-06-14 | End: 2022-06-15

## 2022-06-14 RX ORDER — GABAPENTIN 400 MG/1
300 CAPSULE ORAL THREE TIMES A DAY
Refills: 0 | Status: DISCONTINUED | OUTPATIENT
Start: 2022-06-14 | End: 2022-06-23

## 2022-06-14 RX ORDER — BUPROPION HYDROCHLORIDE 150 MG/1
150 TABLET, EXTENDED RELEASE ORAL DAILY
Refills: 0 | Status: DISCONTINUED | OUTPATIENT
Start: 2022-06-14 | End: 2022-06-23

## 2022-06-14 RX ORDER — REMDESIVIR 5 MG/ML
INJECTION INTRAVENOUS
Refills: 0 | Status: DISCONTINUED | OUTPATIENT
Start: 2022-06-14 | End: 2022-06-16

## 2022-06-14 RX ORDER — TAMSULOSIN HYDROCHLORIDE 0.4 MG/1
0.4 CAPSULE ORAL AT BEDTIME
Refills: 0 | Status: DISCONTINUED | OUTPATIENT
Start: 2022-06-14 | End: 2022-06-23

## 2022-06-14 RX ORDER — HYDRALAZINE HCL 50 MG
50 TABLET ORAL
Refills: 0 | Status: DISCONTINUED | OUTPATIENT
Start: 2022-06-14 | End: 2022-06-16

## 2022-06-14 RX ORDER — DILTIAZEM HCL 120 MG
90 CAPSULE, EXT RELEASE 24 HR ORAL EVERY 6 HOURS
Refills: 0 | Status: DISCONTINUED | OUTPATIENT
Start: 2022-06-14 | End: 2022-06-15

## 2022-06-14 RX ORDER — REMDESIVIR 5 MG/ML
100 INJECTION INTRAVENOUS EVERY 24 HOURS
Refills: 0 | Status: DISCONTINUED | OUTPATIENT
Start: 2022-06-15 | End: 2022-06-16

## 2022-06-14 RX ORDER — LANOLIN ALCOHOL/MO/W.PET/CERES
3 CREAM (GRAM) TOPICAL AT BEDTIME
Refills: 0 | Status: DISCONTINUED | OUTPATIENT
Start: 2022-06-14 | End: 2022-06-23

## 2022-06-14 RX ORDER — TIZANIDINE 4 MG/1
4 TABLET ORAL THREE TIMES A DAY
Refills: 0 | Status: DISCONTINUED | OUTPATIENT
Start: 2022-06-14 | End: 2022-06-23

## 2022-06-14 RX ORDER — CEFTRIAXONE 500 MG/1
1000 INJECTION, POWDER, FOR SOLUTION INTRAMUSCULAR; INTRAVENOUS ONCE
Refills: 0 | Status: COMPLETED | OUTPATIENT
Start: 2022-06-14 | End: 2022-06-14

## 2022-06-14 RX ORDER — METOPROLOL TARTRATE 50 MG
25 TABLET ORAL EVERY 6 HOURS
Refills: 0 | Status: DISCONTINUED | OUTPATIENT
Start: 2022-06-14 | End: 2022-06-15

## 2022-06-14 RX ORDER — APIXABAN 2.5 MG/1
2.5 TABLET, FILM COATED ORAL
Refills: 0 | Status: DISCONTINUED | OUTPATIENT
Start: 2022-06-14 | End: 2022-06-23

## 2022-06-14 RX ORDER — POLYETHYLENE GLYCOL 3350 17 G/17G
17 POWDER, FOR SOLUTION ORAL
Refills: 0 | Status: DISCONTINUED | OUTPATIENT
Start: 2022-06-14 | End: 2022-06-23

## 2022-06-14 RX ORDER — ACETAMINOPHEN 500 MG
650 TABLET ORAL EVERY 6 HOURS
Refills: 0 | Status: DISCONTINUED | OUTPATIENT
Start: 2022-06-14 | End: 2022-06-23

## 2022-06-14 RX ORDER — ATORVASTATIN CALCIUM 80 MG/1
40 TABLET, FILM COATED ORAL AT BEDTIME
Refills: 0 | Status: DISCONTINUED | OUTPATIENT
Start: 2022-06-14 | End: 2022-06-23

## 2022-06-14 RX ORDER — ACETAMINOPHEN 500 MG
1000 TABLET ORAL ONCE
Refills: 0 | Status: COMPLETED | OUTPATIENT
Start: 2022-06-14 | End: 2022-06-14

## 2022-06-14 RX ORDER — CHOLECALCIFEROL (VITAMIN D3) 125 MCG
1000 CAPSULE ORAL DAILY
Refills: 0 | Status: DISCONTINUED | OUTPATIENT
Start: 2022-06-14 | End: 2022-06-23

## 2022-06-14 RX ORDER — ASPIRIN/CALCIUM CARB/MAGNESIUM 324 MG
81 TABLET ORAL DAILY
Refills: 0 | Status: DISCONTINUED | OUTPATIENT
Start: 2022-06-14 | End: 2022-06-23

## 2022-06-14 RX ORDER — ONDANSETRON 8 MG/1
4 TABLET, FILM COATED ORAL EVERY 8 HOURS
Refills: 0 | Status: DISCONTINUED | OUTPATIENT
Start: 2022-06-14 | End: 2022-06-23

## 2022-06-14 RX ORDER — SODIUM CHLORIDE 9 MG/ML
1000 INJECTION INTRAMUSCULAR; INTRAVENOUS; SUBCUTANEOUS ONCE
Refills: 0 | Status: COMPLETED | OUTPATIENT
Start: 2022-06-14 | End: 2022-06-14

## 2022-06-14 RX ORDER — BETHANECHOL CHLORIDE 25 MG
10 TABLET ORAL THREE TIMES A DAY
Refills: 0 | Status: DISCONTINUED | OUTPATIENT
Start: 2022-06-14 | End: 2022-06-23

## 2022-06-14 RX ORDER — METOPROLOL TARTRATE 50 MG
25 TABLET ORAL ONCE
Refills: 0 | Status: COMPLETED | OUTPATIENT
Start: 2022-06-14 | End: 2022-06-14

## 2022-06-14 RX ORDER — SENNA PLUS 8.6 MG/1
2 TABLET ORAL AT BEDTIME
Refills: 0 | Status: DISCONTINUED | OUTPATIENT
Start: 2022-06-14 | End: 2022-06-23

## 2022-06-14 RX ORDER — METHYLPHENIDATE HCL 5 MG
20 TABLET ORAL DAILY
Refills: 0 | Status: DISCONTINUED | OUTPATIENT
Start: 2022-06-14 | End: 2022-06-21

## 2022-06-14 RX ORDER — DILTIAZEM HCL 120 MG
300 CAPSULE, EXT RELEASE 24 HR ORAL DAILY
Refills: 0 | Status: DISCONTINUED | OUTPATIENT
Start: 2022-06-14 | End: 2022-06-14

## 2022-06-14 RX ORDER — LATANOPROST 0.05 MG/ML
1 SOLUTION/ DROPS OPHTHALMIC; TOPICAL AT BEDTIME
Refills: 0 | Status: DISCONTINUED | OUTPATIENT
Start: 2022-06-14 | End: 2022-06-23

## 2022-06-14 RX ORDER — DILTIAZEM HCL 120 MG
90 CAPSULE, EXT RELEASE 24 HR ORAL ONCE
Refills: 0 | Status: COMPLETED | OUTPATIENT
Start: 2022-06-14 | End: 2022-06-14

## 2022-06-14 RX ORDER — REMDESIVIR 5 MG/ML
200 INJECTION INTRAVENOUS EVERY 24 HOURS
Refills: 0 | Status: COMPLETED | OUTPATIENT
Start: 2022-06-14 | End: 2022-06-14

## 2022-06-14 RX ADMIN — Medication 200 MILLIGRAM(S): at 19:00

## 2022-06-14 RX ADMIN — TIZANIDINE 4 MILLIGRAM(S): 4 TABLET ORAL at 22:28

## 2022-06-14 RX ADMIN — Medication 650 MILLIGRAM(S): at 19:00

## 2022-06-14 RX ADMIN — REMDESIVIR 500 MILLIGRAM(S): 5 INJECTION INTRAVENOUS at 19:12

## 2022-06-14 RX ADMIN — Medication 10 MILLIGRAM(S): at 22:26

## 2022-06-14 RX ADMIN — Medication 25 MILLIGRAM(S): at 22:26

## 2022-06-14 RX ADMIN — Medication 25 MILLIGRAM(S): at 15:57

## 2022-06-14 RX ADMIN — LATANOPROST 1 DROP(S): 0.05 SOLUTION/ DROPS OPHTHALMIC; TOPICAL at 22:25

## 2022-06-14 RX ADMIN — CEFTRIAXONE 100 MILLIGRAM(S): 500 INJECTION, POWDER, FOR SOLUTION INTRAMUSCULAR; INTRAVENOUS at 12:00

## 2022-06-14 RX ADMIN — APIXABAN 2.5 MILLIGRAM(S): 2.5 TABLET, FILM COATED ORAL at 19:00

## 2022-06-14 RX ADMIN — Medication 90 MILLIGRAM(S): at 15:57

## 2022-06-14 RX ADMIN — TAMSULOSIN HYDROCHLORIDE 0.4 MILLIGRAM(S): 0.4 CAPSULE ORAL at 22:27

## 2022-06-14 RX ADMIN — GABAPENTIN 300 MILLIGRAM(S): 400 CAPSULE ORAL at 22:27

## 2022-06-14 RX ADMIN — SODIUM CHLORIDE 1000 MILLILITER(S): 9 INJECTION INTRAMUSCULAR; INTRAVENOUS; SUBCUTANEOUS at 10:44

## 2022-06-14 RX ADMIN — ATORVASTATIN CALCIUM 40 MILLIGRAM(S): 80 TABLET, FILM COATED ORAL at 22:27

## 2022-06-14 RX ADMIN — POLYETHYLENE GLYCOL 3350 17 GRAM(S): 17 POWDER, FOR SOLUTION ORAL at 19:00

## 2022-06-14 RX ADMIN — Medication 400 MILLIGRAM(S): at 10:44

## 2022-06-14 RX ADMIN — SENNA PLUS 2 TABLET(S): 8.6 TABLET ORAL at 22:26

## 2022-06-14 RX ADMIN — SODIUM CHLORIDE 1000 MILLILITER(S): 9 INJECTION INTRAMUSCULAR; INTRAVENOUS; SUBCUTANEOUS at 12:05

## 2022-06-14 RX ADMIN — Medication 90 MILLIGRAM(S): at 22:26

## 2022-06-14 RX ADMIN — Medication 50 MILLIGRAM(S): at 19:00

## 2022-06-14 NOTE — H&P ADULT - NEGATIVE GASTROINTESTINAL SYMPTOMS
no nausea/no vomiting/no diarrhea/no flatulence/no abdominal pain/no melena/no hematochezia/no steatorrhea/no jaundice/no hiccoughs

## 2022-06-14 NOTE — ED PROVIDER NOTE - NS ED ATTENDING STATEMENT MOD
This was a shared visit with the SERGE. I reviewed and verified the documentation and independently performed the documented:

## 2022-06-14 NOTE — ED ADULT NURSE NOTE - NSIMPLEMENTINTERV_GEN_ALL_ED
Implemented All Fall Risk Interventions:  [place] to call system. Call bell, personal items and telephone within reach. Instruct patient to call for assistance. Room bathroom lighting operational. Non-slip footwear when patient is off stretcher. Physically safe environment: no spills, clutter or unnecessary equipment. Stretcher in lowest position, wheels locked, appropriate side rails in place. Provide visual cue, wrist band, yellow gown, etc. Monitor gait and stability. Monitor for mental status changes and reorient to person, place, and time. Review medications for side effects contributing to fall risk. Reinforce activity limits and safety measures with patient and family.

## 2022-06-14 NOTE — H&P ADULT - ATTENDING COMMENTS
Pt is a 72 y.o. M with a PMH of HTN, AFIB, BPH, hx of TBI, HLD, CAD, peripheral neuropathy, found to be COVID+ who is being admitted for management of AFib with RVR., COVID 19 + infection.  Pt seen, examined, case & care plan d/w pt, residents at detail.  D/W Wife at bed side at detail,  Cardiology-Dr Sepulveda   ID DR Manning -Remdesivir Rx as per ID  Pulmonary-DR Bui   Aspiration precautions  -Isolation  AM labs   PO diet.

## 2022-06-14 NOTE — H&P ADULT - NEGATIVE NEUROLOGICAL SYMPTOMS
no transient paralysis/no generalized seizures/no vertigo/no headache/no loss of consciousness/no hemiparesis/no confusion/no facial palsy

## 2022-06-14 NOTE — H&P ADULT - PROBLEM SELECTOR PLAN 7
-Continue flomax. BM yesterday, however previous BM was 1 week ago. Abdomen distended but soft on exam.  -Start miralax BID + senna.

## 2022-06-14 NOTE — CONSULT NOTE ADULT - ASSESSMENT
[facility]  Chart Reviewed-Full Consult to follow for any immediate concerns please fell free to contact us directly at  [phone] and have us paged or text my cell # [phone]  Ovidio Manning MD PhD

## 2022-06-14 NOTE — H&P ADULT - GASTROINTESTINAL
details… soft/nontender/no guarding/no rigidity/no organomegaly/no palpable moreno/distended soft/nontender/nondistended/no guarding/no rigidity/no organomegaly/no palpable moreno/no masses palpable/distended

## 2022-06-14 NOTE — PATIENT PROFILE ADULT - FALL HARM RISK - HARM RISK INTERVENTIONS

## 2022-06-14 NOTE — ED PROVIDER NOTE - PHYSICAL EXAMINATION
Constitutional: Awake, Alert, non-toxic. NAD. Well appearing, well nourished.   HEAD: Normocephalic, atraumatic.   EYES: PERRL, EOM intact, conjunctiva and sclera are clear bilaterally. No raccoon eyes.   ENT: No rhinorrhea, normal pharynx, patent, no tonsillar exudate or enlargement, mucous membranes pink/moist, no erythema, no drooling or stridor.   NECK: Supple, non-tender  CARDIOVASCULAR: Normal S1, S2; regular rate and rhythm.  RESPIRATORY: Normal respiratory effort; breath sounds CTAB, no wheezes, rhonchi, or rales. Speaking in full sentences. No accessory muscle use.   ABDOMEN: Soft; non-tender, non-distended   EXTREMITIES: non-tender to palpation; distal pulses palpable and symmetric, no edema, no crepitus or step off  SKIN: Warm, dry; good skin turgor, no apparent lesions or rashes, no ecchymosis, brisk capillary refill.  NEURO: A&O x1.  (+) limited ROM RLE/LLE/RUE, CN II-XII grossly intact, Speech clear, without articulation or word-finding difficulties. Eyes- PERRL bilaterally. EOMs in tact. No nystagmus. No facial droop.

## 2022-06-14 NOTE — H&P ADULT - PROBLEM SELECTOR PLAN 4
-Continue losartan 100mg QD.  -Monitor routine hemodynamics. Pt wife to bring in eplerenone.  -Continue home losartan 100mg QD, Eplerenone 25mg QD, and Hydralazine 50mg BID with hold parameters.  -Monitor routine hemodynamics. Pt wife to bring in eplerenone.  -Continue home losartan 100mg QD and Hydralazine 50mg BID with hold parameters. Hold home eplerenone.  -Monitor routine hemodynamics. -Chronic -stable  -Continue home losartan 100mg QD and Hydralazine 50mg BID with hold parameters.  -On CCB & BB    Hold home eplerenone as per Cardiology .  -Monitor routine hemodynamics.

## 2022-06-14 NOTE — ED PROVIDER NOTE - OBJECTIVE STATEMENT
71 y/o male with PMHx HTN, A Fib (on Eliquis) presents today due to fever and lethargy starting today. pt wife reports patient has hx of TBI with residual weakness to extremities. pt was taken off AC but placed on Eliquis 2 days ago. Reports patient tested positive for COVID. pt denies vomiting, diarrhea, chest pain, hematochezia, SOB, headache, abdominal pain, or any other complaints.

## 2022-06-14 NOTE — H&P ADULT - GENITOURINARY MALE
normal external genitalia/no hernia/no discharge/no mass/no tenderness/no ulcer/normal/no penile lesion/no palpable testicular mass/no scrotal mass

## 2022-06-14 NOTE — ED PROVIDER NOTE - NSICDXPASTMEDICALHX_GEN_ALL_CORE_FT
PAST MEDICAL HISTORY:  Atrial fibrillation     DM (diabetes mellitus)     HTN (hypertension)     TBI (traumatic brain injury)

## 2022-06-14 NOTE — CONSULT NOTE ADULT - ASSESSMENT
72 y.o. M with a PMH of HTN, AFIB, BPH, hx of TBI, HLD, CAD, peripheral neuropathy, found to be COVID+ who presented to the ED with CC of fatigue    covid  HTN  OA  OP  AF  HLD  CAD  Neuropathy  hx of TBI  Weakness - Fatigue - viral syndrome    cxr clear   vs noted  labs reviewed  CT head noted  assist with needs - ADL  cvs rx regimen and BP control - HTN - AF - HLD - CAD  rate and rhythm control - AF  on AC for AF -   will check D Dimer  HOB elev  GOC discussion  tolerating RA at present  isolation precs  ACAP prn for sx management  Robitussin prn for sx management

## 2022-06-14 NOTE — H&P ADULT - PROBLEM SELECTOR PLAN 2
Airborne Precautions  - Continue with O2 as needed via NC and up-titrate as needed.  - Continuous pulse ox.  - Acetaminophen 650 mg PO q4h PRN fever. Limit use of NSAIDs.  - HFA albuterol Q6 hour PRN via MDI. Would avoid nebulized preparations to limit risk of aerosol formation.  - Defer systemic steroids/interleukin inhibitor at this time; would consider if inflammatory markers are elevated and patient has worsening hypoxia.  - Will consider paxlovid.  - Labs Testing: Daily or As Needed: CBC w/ diff, CMP; Every 3 days: CRP, Ferritin, Procalcitonin.  - Continue eliquis for DVT PPx.  -Pulm Dr. Bui consulted.  -Dr. Nigel MARIE consulted. Airborne & Contact  Precautions  - Acetaminophen 650 mg PO q 6 h PRN fever.   -Supportive care , No Hypoxia at this time   - HFA albuterol Q6 hour PRN via MDI. Would avoid nebulized preparations to limit risk of aerosol formation.  - Defer systemic steroids/interleukin inhibitor at this time; would consider if inflammatory markers are elevated and patient has worsening hypoxia.  - Labs Testing: Daily or As Needed: CBC w/ diff, CMP; Every 3 days: CRP, Ferritin, Procalcitonin.  - Continue Eliquis for DVT PPx.  -Pulm Dr. Bui consulted by ER .  -Dr. Manning ID consulted.-- Will consider paxlovid.

## 2022-06-14 NOTE — H&P ADULT - PROBLEM SELECTOR PLAN 1
ECG: Afib with RVR, sustaining in the 120s  -Remote telemetry  -On home diltiazem 300mg/24hrs.  -Continue eliquis BID.  -Cardiology Dr. Sepulveda consulted. ECG: Afib with RVR, sustaining in the 120s-140s sustaining. Restarted on eliquis 2 days ago.  -Remote telemetry  -On home diltiazem 300mg/24hrs.  -Continue eliquis BID.  -TTE ordered.  -Cardiology Dr. Sepulveda consulted. ECG: Afib with RVR, sustaining in the 120s-140s sustaining. Restarted on eliquis 2 days ago.  -Remote telemetry  -On home diltiazem 300mg/24hrs.  -Continue eliquis BID.  -TTE ordered.  -F/u AM K, Mg, Phos. Replete PRN.  -Cardiology Dr. Sepulveda consulted. ECG: Afib with RVR, sustaining in the 120s-140s sustaining. Restarted on eliquis 2 days ago.  -Remote telemetry  -On home diltiazem 300mg/24hrs.  -Start Lopressor 25mg Q6 + Cardizem 90 Q6 with hold parameters  -Continue eliquis BID.  -TTE ordered.  -F/u AM K, Mg, Phos. Replete PRN.  -Cardiology Dr. Sepulveda consulted. -A Fib wit RVR likely 2/2 Fever 2/2 AC Viral  COVID infection  ECG: Afib with RVR, sustaining in the 120s-140s sustaining. Restarted on Eliquis 2 days ago.  On telemetry  -On home diltiazem 300mg/24hrs.  -Start Lopressor 25mg Q6 + Cardizem 90 Q6 with hold parameters  -Continue Eliquis BID.  -TTE ordered.  -F/u AM K, Mg, Phos. Replete PRN.  -Cardiology Dr. Sepulveda consulted.

## 2022-06-14 NOTE — ED ADULT NURSE NOTE - OBJECTIVE STATEMENT
Patient is a 71yo male with history of TBI and lower extremity deficits and constrictions  patient is bed bound  with PMHx HTN, A Fib (on Eliquis) presents today due to fever and lethargy starting today. pt wife reports patient has hx of TBI with residual weakness to extremities. pt was taken off AC but placed on Eliquis 2 days ago. Reports patient tested positive for COVID. pt denies vomiting, diarrhea, chest pain, hematochezia, SOB, headache, abdominal pain, or any other complaints.

## 2022-06-14 NOTE — H&P ADULT - PROBLEM SELECTOR PLAN 3
Hx of TBI with RLE, RUE, and LLE residual weakness.  CT Head Non-Con: Mild to moderate chronic microvascular changes. 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   -Fall, aspiration, safety precautions  -PT consulted. Hx of TBI with RLE, RUE, and LLE residual weakness. As per wife, due to severe peripheral neuropathy, patient had a fall while on eliquis and had a brain bleed.  CT Head Non-Con: Mild to moderate chronic microvascular changes. 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   -Fall, aspiration, safety precautions  -PT consulted. Hx of TBI with RLE, RUE, and LLE residual weakness. As per wife, due to severe peripheral neuropathy, patient had a fall while on Eliquis and had a brain bleed.  CT Head Non-Con: Mild to moderate chronic microvascular changes. 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   -Fall, aspiration, safety precautions  -Pt is Mostly W/C Bound & bed bound , Non ambulatory with Contractures of Ext.  -On Muscle relaxant   -PT consulted.

## 2022-06-14 NOTE — H&P ADULT - NSICDXPASTMEDICALHX_GEN_ALL_CORE_FT
PAST MEDICAL HISTORY:  Atrial fibrillation     BPH (benign prostatic hyperplasia)     CAD (coronary artery disease)     HLD (hyperlipidemia)     HTN (hypertension)     Major depression     Peripheral neuropathy     TBI (traumatic brain injury)

## 2022-06-14 NOTE — CONSULT NOTE ADULT - ASSESSMENT
Pt is a 72 y.o. M with a PMH of HTN, AFIB, BPH, hx of TBI, HLD, CAD, peripheral neuropathy, found to be COVID+ who is being admitted for management of AFib with RVR.    #Afib RVR, HTN, HLD  - Patient presenting with Afib RVR with HR 120s-140s- likely in setting of acute COVID infection   - Patient is not complaining of any cardiac symptoms at this time.  - No clear evidence of acute ischemia, trops negative.  - His CKs are flat, suggesting against acute atherosclerotic plaque rupture.  - Biomarker trend is not consistent with plaque rupture but rather demand ischemia. Monitor closely for the development of anginal symptoms or clinical signs of ischemia.   - EKG shows Afib with RVR   - No meaningful evidence of volume overload.  - Previous TTE shows EF 60-65% on 7/2020  - BP well controlled, monitor routine hemodynamics.  - Continue ___.  - Monitor and replete lytes, keep K>4, Mg>2.  - Other cardiovascular workup will depend on clinical course.  - All other workup per primary team.  - Will continue to follow.             Pt is a 72 y.o. M with a PMH of HTN, AFIB, BPH, hx of TBI, HLD, CAD, peripheral neuropathy, found to be COVID+ who is being admitted for management of AFib with RVR.    #Afib RVR, HTN, HLD  - Patient presenting with Afib RVR with HR 120s-140s- likely in setting of acute COVID infection   - Patient is not complaining of any cardiac symptoms at this time.  - No clear evidence of acute ischemia, trops negative.  - His CKs are flat, suggesting against acute atherosclerotic plaque rupture.  - Biomarker trend is not consistent with plaque rupture but rather demand ischemia. Monitor closely for the development of anginal symptoms or clinical signs of ischemia.   - EKG shows Afib with RVR   - No meaningful evidence of volume overload.  - Previous TTE shows EF 60-65% on 7/2020  - TTE ordered   - BP well controlled, monitor routine hemodynamics.  - Start Metoprolol 25mg q6 and Diltiazem 90mg q6 with hold parameters  - Continue Losartan 100mg daily, Hydralazine 50mg BID with hold parameters  - Continue ASA 81mg and Rosuvastatin 10mg   - HOLD Eplerenone   - Monitor and replete lytes, keep K>4, Mg>2.  - Other cardiovascular workup will depend on clinical course.  - All other workup per primary team.  - Will continue to follow.             Pt is a 72 y.o. M with a PMH of HTN, AFIB, BPH, hx of TBI, HLD, CAD, peripheral neuropathy, found to be COVID+ who is being admitted for management of AFib with RVR.    #Afib RVR, HTN, HLD  - Patient presenting with Afib RVR with HR 120s-140s- likely in setting of acute COVID infection   - Patient is not complaining of any cardiac symptoms at this time.  - No clear evidence of acute ischemia, trops negative.  - His CKs are flat, suggesting against acute atherosclerotic plaque rupture.  - Biomarker trend is not consistent with plaque rupture but rather demand ischemia. Monitor closely for the development of anginal symptoms or clinical signs of ischemia.   - EKG shows Afib with RVR   - No meaningful evidence of volume overload.  - Previous TTE shows EF 60-65% on 7/2020  - TTE ordered   - BP well controlled, monitor routine hemodynamics.  - Start Lopressor 25mg q6 and Diltiazem 90mg q6 with hold parameters  - Continue Losartan 100mg daily, Hydralazine 50mg BID with hold parameters  - Continue ASA 81mg and Rosuvastatin 10mg   - HOLD Eplerenone   - Monitor and replete lytes, keep K>4, Mg>2.  - Other cardiovascular workup will depend on clinical course.  - All other workup per primary team.  - Will continue to follow.

## 2022-06-14 NOTE — H&P ADULT - ASSESSMENT
Pt is a 72 y.o. M with a PMH of HTN, AFIB, BPH, hx of TBI, HLD, CAD, peripheral neuropathy, found to be COVID+ who is being admitted for management of AFib with RVR. Pt is a 72 y.o. M with a PMH of HTN, AFIB, BPH, hx of TBI, HLD, CAD, peripheral neuropathy, found to be COVID+ who is being admitted for management of AFib with RVR., COVID 19 + infection.

## 2022-06-14 NOTE — H&P ADULT - NEUROLOGICAL
details… Diminished sensation on hands and feet b/l./cranial nerves II-XII intact/responds to pain/responds to verbal commands Diminished sensation on hands and feet b/l./cranial nerves II-XII intact/responds to pain/responds to verbal commands/cranial nerves intact

## 2022-06-14 NOTE — ED PROVIDER NOTE - WET READ LAUNCH FT
Addended by: SHAWN DAVIS on: 7/11/2017 12:32 PM     Modules accepted: Orders     There are no Wet Read(s) to document.

## 2022-06-14 NOTE — H&P ADULT - MUSCULOSKELETAL
negative strength 5/5 bilateral upper extremities/strength 5/5 bilateral lower extremities no calf tenderness/decreased ROM/decreased strength

## 2022-06-14 NOTE — ED PROVIDER NOTE - CARE PLAN
1 Principal Discharge DX:	COVID   Principal Discharge DX:	COVID  Secondary Diagnosis:	Atrial fibrillation

## 2022-06-14 NOTE — H&P ADULT - NEGATIVE GENERAL SYMPTOMS
no chills/no sweating/no anorexia/no weight loss/no weight gain/no polyphagia/no polyuria/no polydipsia

## 2022-06-14 NOTE — CONSULT NOTE ADULT - ATTENDING COMMENTS
AF with RVR secondary to COVID infection.  Patient with mild resp distress on my interview.  To start rate controlling agents as above. On Eliquis 2.5 bid, reduced dose likely secondary to recent bleed.  To follow closely while admitted.  At risk for abrupt decompensation.

## 2022-06-14 NOTE — H&P ADULT - NSHPSOCIALHISTORY_GEN_ALL_CORE
Vaccinated for COVID 3/3, last shot in february with moderna. Ambulates without assistance- pt is bedbound and wheelchair bound. Pt is reliant on wife for ADLs. Denies alcohol, tobacco, or recreational drug use.

## 2022-06-14 NOTE — H&P ADULT - PROBLEM SELECTOR PLAN 9
-Continue gabapentin as scheduled.  -Activity: out of bed to chair  -Fall risk, Safety precautions, Bed Alarm  -PT consulted.

## 2022-06-14 NOTE — H&P ADULT - RESPIRATORY
normal/clear to auscultation bilaterally/no wheezes/no rales/no rhonchi normal/clear to auscultation bilaterally/no wheezes/no rales/no rhonchi/no respiratory distress/breath sounds equal/good air movement

## 2022-06-14 NOTE — H&P ADULT - HISTORY OF PRESENT ILLNESS
Pt is a 72 y.o. M with a PMH of HTN, AFIB, BPH, hx of TBI, HLD, CAD, found to be COVID+ who presented to the ED in AFib with RVR.    In the ED:  T 101.2 F Oral  /90 RR 18 SpO2 98% on RA  WBC 11.44 INR 1.3 Glucose 154 Procal 0.11 COVID +  CT Head Non-Con: Mild to moderate chronic microvascular changes. 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   CXR: Lung Clear.  ECG: AFib RVR  2L NS Bolus x1, 1g IV Tylenol, 1g Rocephin Pt is a 72 y.o. M with a PMH of HTN, AFIB, BPH, hx of TBI, HLD, CAD, peripheral neuropathy, found to be COVID+ who presented to the ED with CC of fatigue. Pt is not the best historian after having TBI 2/2 fall on AC, so history taken via phone from wife. Pt reports that this morning he woke up "feeling lousy" with a fever and a cough. Pt tested COVID + today. Pt restarted eliquis 2 days ago, and follows up with Dr. Goldsmith for cardiology while outpatient. Pt endorses fever, lethargy diminished sensation on hands and feet 2/2 neuropathy, loss of balance, cough, constipation (last BM yesterday, but before that 1 week prior). Denies CP, palpitations, n/v/d, abdominal pain, leg pain.    In the ED:  T 101.2 F Oral  /90 RR 18 SpO2 98% on RA  WBC 11.44 INR 1.3 Glucose 154 Procal 0.11 COVID +  CT Head Non-Con: Mild to moderate chronic microvascular changes. 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   CXR: Lung Clear.  ECG: AFib RVR  2L NS Bolus x1, 1g IV Tylenol, 1g Rocephin Pt is a 72 y.o. M with a PMH of HTN, AFIB, BPH, hx of TBI, HLD, CAD, peripheral neuropathy, found to be COVID+ who presented to the ED with CC of fatigue. Pt is not the best historian after having TBI 2/2 fall on AC, so history taken via phone from wife. Pt reports that this morning he woke up "feeling lousy" with a fever and a cough. Pt tested COVID + today. Pt restarted eliquis 2 days ago, and follows up with Dr. Goldsmith for cardiology while outpatient. Pt endorses fever, lethargy diminished sensation on hands and feet 2/2 neuropathy, loss of balance, cough, constipation (last BM yesterday, but before that 1 week prior). Denies CP, palpitations, n/v/d, abdominal pain, leg pain. as per wife pt had some friends who visited pt & pt also has 2 HHA .    In the ED:  T 101.2 F Oral  /90 RR 18 SpO2 98% on RA  WBC 11.44 INR 1.3 Glucose 154 Procal 0.11 COVID +  CT Head Non-Con: Mild to moderate chronic microvascular changes. 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   CXR: Lung Clear.  ECG: AFib RVR  2L NS Bolus x1, 1g IV Tylenol, 1g Rocephin

## 2022-06-14 NOTE — ED PROVIDER NOTE - CLINICAL SUMMARY MEDICAL DECISION MAKING FREE TEXT BOX
73yo M with hx SDH, HTN, afib (restarted eliquis yesterday), non ambulatory p/w lethargy, fever and tested covid positive at home this morning. labs, IVFs, lactate, blood cultures, UA, Urine culture, RVP, CT head, EKG, CXR, pulmonolgy consult, cardiology consult, Covid, sepsis

## 2022-06-15 LAB
A1C WITH ESTIMATED AVERAGE GLUCOSE RESULT: 6.9 % — HIGH (ref 4–5.6)
ALBUMIN SERPL ELPH-MCNC: 3 G/DL — LOW (ref 3.3–5)
ALP SERPL-CCNC: 87 U/L — SIGNIFICANT CHANGE UP (ref 40–120)
ALT FLD-CCNC: 18 U/L — SIGNIFICANT CHANGE UP (ref 12–78)
ANION GAP SERPL CALC-SCNC: 8 MMOL/L — SIGNIFICANT CHANGE UP (ref 5–17)
AST SERPL-CCNC: 12 U/L — LOW (ref 15–37)
BILIRUB SERPL-MCNC: 0.4 MG/DL — SIGNIFICANT CHANGE UP (ref 0.2–1.2)
BUN SERPL-MCNC: 19 MG/DL — SIGNIFICANT CHANGE UP (ref 7–23)
CALCIUM SERPL-MCNC: 8.5 MG/DL — SIGNIFICANT CHANGE UP (ref 8.5–10.1)
CHLORIDE SERPL-SCNC: 108 MMOL/L — SIGNIFICANT CHANGE UP (ref 96–108)
CHOLEST SERPL-MCNC: 120 MG/DL — SIGNIFICANT CHANGE UP
CO2 SERPL-SCNC: 25 MMOL/L — SIGNIFICANT CHANGE UP (ref 22–31)
CREAT SERPL-MCNC: 1.1 MG/DL — SIGNIFICANT CHANGE UP (ref 0.5–1.3)
CRP SERPL-MCNC: 45 MG/L — HIGH
D DIMER BLD IA.RAPID-MCNC: 154 NG/ML DDU — SIGNIFICANT CHANGE UP
EGFR: 72 ML/MIN/1.73M2 — SIGNIFICANT CHANGE UP
ESTIMATED AVERAGE GLUCOSE: 151 MG/DL — HIGH (ref 68–114)
FERRITIN SERPL-MCNC: 113 NG/ML — SIGNIFICANT CHANGE UP (ref 30–400)
GLUCOSE SERPL-MCNC: 129 MG/DL — HIGH (ref 70–99)
HCT VFR BLD CALC: 40 % — SIGNIFICANT CHANGE UP (ref 39–50)
HCV AB S/CO SERPL IA: 0.06 S/CO — SIGNIFICANT CHANGE UP (ref 0–0.99)
HCV AB SERPL-IMP: SIGNIFICANT CHANGE UP
HDLC SERPL-MCNC: 49 MG/DL — SIGNIFICANT CHANGE UP
HGB BLD-MCNC: 12.9 G/DL — LOW (ref 13–17)
INR BLD: 1.33 RATIO — HIGH (ref 0.88–1.16)
LIPID PNL WITH DIRECT LDL SERPL: 59 MG/DL — SIGNIFICANT CHANGE UP
MAGNESIUM SERPL-MCNC: 2.2 MG/DL — SIGNIFICANT CHANGE UP (ref 1.6–2.6)
MCHC RBC-ENTMCNC: 26.7 PG — LOW (ref 27–34)
MCHC RBC-ENTMCNC: 32.3 GM/DL — SIGNIFICANT CHANGE UP (ref 32–36)
MCV RBC AUTO: 82.8 FL — SIGNIFICANT CHANGE UP (ref 80–100)
NON HDL CHOLESTEROL: 71 MG/DL — SIGNIFICANT CHANGE UP
NRBC # BLD: 0 /100 WBCS — SIGNIFICANT CHANGE UP (ref 0–0)
PHOSPHATE SERPL-MCNC: 2.9 MG/DL — SIGNIFICANT CHANGE UP (ref 2.5–4.5)
PLATELET # BLD AUTO: 156 K/UL — SIGNIFICANT CHANGE UP (ref 150–400)
POTASSIUM SERPL-MCNC: 4.1 MMOL/L — SIGNIFICANT CHANGE UP (ref 3.5–5.3)
POTASSIUM SERPL-SCNC: 4.1 MMOL/L — SIGNIFICANT CHANGE UP (ref 3.5–5.3)
PROT SERPL-MCNC: 6 G/DL — SIGNIFICANT CHANGE UP (ref 6–8.3)
PROTHROM AB SERPL-ACNC: 15.6 SEC — HIGH (ref 10.5–13.4)
RBC # BLD: 4.83 M/UL — SIGNIFICANT CHANGE UP (ref 4.2–5.8)
RBC # FLD: 14.9 % — HIGH (ref 10.3–14.5)
SODIUM SERPL-SCNC: 141 MMOL/L — SIGNIFICANT CHANGE UP (ref 135–145)
TRIGL SERPL-MCNC: 60 MG/DL — SIGNIFICANT CHANGE UP
WBC # BLD: 8.66 K/UL — SIGNIFICANT CHANGE UP (ref 3.8–10.5)
WBC # FLD AUTO: 8.66 K/UL — SIGNIFICANT CHANGE UP (ref 3.8–10.5)

## 2022-06-15 PROCEDURE — 99233 SBSQ HOSP IP/OBS HIGH 50: CPT

## 2022-06-15 PROCEDURE — 93306 TTE W/DOPPLER COMPLETE: CPT | Mod: 26

## 2022-06-15 RX ORDER — DILTIAZEM HCL 120 MG
30 CAPSULE, EXT RELEASE 24 HR ORAL EVERY 6 HOURS
Refills: 0 | Status: DISCONTINUED | OUTPATIENT
Start: 2022-06-15 | End: 2022-06-17

## 2022-06-15 RX ADMIN — GABAPENTIN 300 MILLIGRAM(S): 400 CAPSULE ORAL at 13:24

## 2022-06-15 RX ADMIN — GABAPENTIN 300 MILLIGRAM(S): 400 CAPSULE ORAL at 22:24

## 2022-06-15 RX ADMIN — Medication 30 MILLIGRAM(S): at 21:45

## 2022-06-15 RX ADMIN — POLYETHYLENE GLYCOL 3350 17 GRAM(S): 17 POWDER, FOR SOLUTION ORAL at 18:28

## 2022-06-15 RX ADMIN — LATANOPROST 1 DROP(S): 0.05 SOLUTION/ DROPS OPHTHALMIC; TOPICAL at 21:45

## 2022-06-15 RX ADMIN — APIXABAN 2.5 MILLIGRAM(S): 2.5 TABLET, FILM COATED ORAL at 18:26

## 2022-06-15 RX ADMIN — TIZANIDINE 4 MILLIGRAM(S): 4 TABLET ORAL at 13:25

## 2022-06-15 RX ADMIN — Medication 3 MILLIGRAM(S): at 21:46

## 2022-06-15 RX ADMIN — Medication 10 MILLIGRAM(S): at 06:10

## 2022-06-15 RX ADMIN — APIXABAN 2.5 MILLIGRAM(S): 2.5 TABLET, FILM COATED ORAL at 06:10

## 2022-06-15 RX ADMIN — REMDESIVIR 500 MILLIGRAM(S): 5 INJECTION INTRAVENOUS at 20:21

## 2022-06-15 RX ADMIN — Medication 1000 UNIT(S): at 13:24

## 2022-06-15 RX ADMIN — ATORVASTATIN CALCIUM 40 MILLIGRAM(S): 80 TABLET, FILM COATED ORAL at 21:46

## 2022-06-15 RX ADMIN — BUPROPION HYDROCHLORIDE 150 MILLIGRAM(S): 150 TABLET, EXTENDED RELEASE ORAL at 13:24

## 2022-06-15 RX ADMIN — Medication 10 MILLIGRAM(S): at 13:25

## 2022-06-15 RX ADMIN — TIZANIDINE 4 MILLIGRAM(S): 4 TABLET ORAL at 06:11

## 2022-06-15 RX ADMIN — Medication 81 MILLIGRAM(S): at 13:33

## 2022-06-15 RX ADMIN — GABAPENTIN 300 MILLIGRAM(S): 400 CAPSULE ORAL at 06:10

## 2022-06-15 RX ADMIN — Medication 50 MILLIGRAM(S): at 18:26

## 2022-06-15 RX ADMIN — TIZANIDINE 4 MILLIGRAM(S): 4 TABLET ORAL at 21:45

## 2022-06-15 RX ADMIN — SENNA PLUS 2 TABLET(S): 8.6 TABLET ORAL at 21:45

## 2022-06-15 RX ADMIN — Medication 20 MILLIGRAM(S): at 13:24

## 2022-06-15 RX ADMIN — TAMSULOSIN HYDROCHLORIDE 0.4 MILLIGRAM(S): 0.4 CAPSULE ORAL at 21:45

## 2022-06-15 RX ADMIN — Medication 10 MILLIGRAM(S): at 21:46

## 2022-06-15 NOTE — PROGRESS NOTE ADULT - PROBLEM SELECTOR PROBLEM 4
Pt DCed with discharge instructions wife wife at side. No questions at this time. Denied CP/SOB. Ambulated independently.  Electronically signed by Mic Lincoln RN on 6/13/2019 at 8:41 PM HTN (hypertension)

## 2022-06-15 NOTE — CONSULT NOTE ADULT - ASSESSMENT
72 y.o. M with a PMH of HTN, AFIB, BPH, hx of TBI, HLD, CAD, peripheral neuropathy, found to be COVID+ who presented to the ED with CC of fatigue. Symptom onset 6/14, Vaccinated w Moderna    RECOMMENDATIONS  6/14-on RA, early in disease course, Remdesivir started, avoid steroids at this stage, on apixaban  6/15- continue current therapy    First week COVID Rx options in order or recommended selection  1-Paxlovid BID x 5 days with 88-89% reduction in progression if given in first 3-5 days but adjust for renal function and check for med interactions  2-Remdesivir x 3 days with 87% reduction in progression if given in the first 7 days  3-Effective monoclonal Rx-currently Bebtolovimab in first 8 days  4-Molnupirivir w only 30% reduction in progression but no drug interactions or renal adjustments    COVID-19-high risk for decompensation EARLY INFLAMMATORY PHASE (5-14 days post symptom onset),    REMDESIVIR-5 day course consider within 10 days of symptom onset, sats<94% on RA and prior to need for high flow oxygen or mech ventilation, Criteria for use include:  • ALT and AST < 10X ULN, but if in first 5 days of illness benefit and extension of license for 3 day short course to decrease progression  STEROIDs recommended AFTER 1st week of symptom onset for any patients that are hypoxic  and  with dexamethasone 6mg  Q-day x 10 days (equivalent to solumedrol 32mg IV or Prednisone 40mg)-higher doses to be considered in more severe disease,   ANTICOAGULATION- most current data and guidelines suggesting full dose outside of ICU w LMWH 1mg/kg SQ q12  but prophylactic dose to be considered in patients with low risk for thromboembolic complications or elevated risk of bleeding -heparin for hemodialysis patients, current guidelines/science suggest to only consider discharge on oral anticoagulation with rivaroxaban (Xarelto) 10mg PO QD or Eliquis (apixaban) 2.5mg PO BID if elevated IMPROVE score and elevated D-dimer levels   TOCILIZUMAB may be considered for patients during the early inflammatory phase (day7-14-and less than 48hrs at ICU level care) progressing due to COVID w increasing oxygen support after start of steroids, would not use without steroids or past the early inflammatory period (use less than 14 days since symptom onset or initial +PCR), caution regarding use with active infection, other immunosuppressants, GI perforation, ANC<1000, Plts<50k, AST/ALT>5xULN, dosing <40kg-8mg/kg, [id number]-[id number], >[id number]-[id number], >90kg-800mg, in studies if fails to respond can give second dose within next 12-24 hours without active secondary infection, malignancy or immune suppression  CONVALESCENT PLASMA-no clear benefit in COVID-19 outside of first few days and only with high titer despite extensive investigations  MONOCLONAL ANTIBODY THERAPY- critical role if give within first 7-10 days post symptom onset prior to onset of early inflammatory phase, mortality benefit in RECOVERY Trial only in hospitalized patients still serology negative but may be harmful if given late in hospitalized patients-EUA limits in hospital use and critical to only use Mab that is effective for particular variant  -daily, BMP, CBC w diff to follow NLR and in some contexts daily or periodic D-dimer levels, -other labs to risk stratify or with any decompensation  Procalcitonin,  Ferritin,  CRP, ESR, PT/PTT but limit excessive testing -antibiotics only if there is concern for a bacterial process (noted to be an issue in only a minority on presentation, rec full eval with ferritin procalcitonin ratio (FPR) <1000 suggesting bacterial process  (consider proning and tolerating lower oxygen saturation to avoid intubation).  All guidelines qualified with need to treat each patient based on their individual profile, phase of disease and most current data available    Thank you for consulting us and involving us in the management of this most interesting and challenging case.  We will follow along in the care of this patient. Please call us at [phone] or text me directly on my cell# at [phone] with any concerns.

## 2022-06-15 NOTE — PROGRESS NOTE ADULT - PROBLEM SELECTOR PLAN 1
likely 2/2 fever 2/2 COVID - rate-controlled - improved - on AC  - hold home diltiazem 300mg/24hrs.  - c/w Lopressor 25mg Q6 + Cardizem 90 Q6 w/ hold parameters  - c/w home Eliquis 2.5 mg BID  - f/up TTE  - continue continuous tele monitoring  cardio following - Grace - will appreciate recs likely 2/2 fever 2/2 COVID - rate-controlled - improved  on CCB,BB - on AC  - hold home diltiazem 300mg/24hrs.  - c/w Lopressor 25mg Q6 + Cardizem 90 Q6 w/ hold parameters    - continue continuous tele monitoring  cardio following -DR MARTÍNEZ wise -  - No meaningful evidence of volume overload.  - Previous TTE shows EF 60-65% on 7/2020  - TTE   - continue Cardizem although has not been getting 2/2 bp- can decrease to 30 every 6 hours for now   -  hold losartan and hydralazine given soft bp , can hold lopressor for now has not been getting 2/2 soft bp   - Continue ASA 81mg and Rosuvastatin 10mg   - Eplerenone on hold

## 2022-06-15 NOTE — PROGRESS NOTE ADULT - ATTENDING COMMENTS
Pt is a 72 y.o. M with a PMH of HTN, AFIB, BPH, hx of TBI, HLD, CAD, peripheral neuropathy, found to be COVID+ who is being admitted for management of AFib with RVR., COVID 19 + infection.  Pt seen, examined, case & care plan d/w pt, residents at detail.  D/W Wife at  detail, on Phone today   Cardiology-Dr Goldsmith follow up   ID DR Manning -Remdesivir Rx as per ID  Pulmonary-DR Bui -supportive care   Aspiration precautions   COVID -Isolation  AM labs   PO diet.   Total care time is 45 minutes.

## 2022-06-15 NOTE — PHYSICAL THERAPY INITIAL EVALUATION ADULT - IMPAIRMENTS CONTRIBUTING IMPAIRED BED MOBILITY, REHAB EVAL
impaired balance/cognition/decreased flexibility/impaired motor control/decreased ROM/decreased strength

## 2022-06-15 NOTE — PROGRESS NOTE ADULT - SUBJECTIVE AND OBJECTIVE BOX
[facility] -- Janak Edwards, Alexey العراقي, Agus Sepulveda Savella  Office # [phone]      Follow Up:    afib   Subjective/Observations:     Seen at bedside, will open eyes, unable to provide any meaningful information ,remains on room air   REVIEW OF SYSTEMS: has history of TBI, unable to provide     PAST MEDICAL & SURGICAL HISTORY:  TBI (traumatic brain injury)      HTN (hypertension)      Atrial fibrillation      Peripheral neuropathy      Major depression      HLD (hyperlipidemia)      CAD (coronary artery disease)      BPH (benign prostatic hyperplasia)      No significant past surgical history          MEDICATIONS  (STANDING):  apixaban 2.5 milliGRAM(s) Oral two times a day  aspirin  chewable 81 milliGRAM(s) Oral daily  atorvastatin 40 milliGRAM(s) Oral at bedtime  bethanechol 10 milliGRAM(s) Oral three times a day  buPROPion XL (24-Hour) . 150 milliGRAM(s) Oral daily  cholecalciferol 1000 Unit(s) Oral daily  diltiazem    Tablet 90 milliGRAM(s) Oral every 6 hours  gabapentin 300 milliGRAM(s) Oral three times a day  hydrALAZINE 50 milliGRAM(s) Oral two times a day  latanoprost 0.005% Ophthalmic Solution 1 Drop(s) Both EYES at bedtime  losartan 100 milliGRAM(s) Oral daily  methylphenidate 20 milliGRAM(s) Oral daily  metoprolol tartrate 25 milliGRAM(s) Oral every 6 hours  polyethylene glycol 3350 17 Gram(s) Oral two times a day  remdesivir  IVPB   IV Intermittent   remdesivir  IVPB 100 milliGRAM(s) IV Intermittent every 24 hours  senna 2 Tablet(s) Oral at bedtime  tamsulosin 0.4 milliGRAM(s) Oral at bedtime  tiZANidine 4 milliGRAM(s) Oral three times a day    MEDICATIONS  (PRN):  acetaminophen     Tablet .. 650 milliGRAM(s) Oral every 6 hours PRN Temp greater or equal to 38C (100.4F), Mild Pain (1 - 3)  aluminum hydroxide/magnesium hydroxide/simethicone Suspension 30 milliLiter(s) Oral every 4 hours PRN Dyspepsia  guaiFENesin Oral Liquid (Sugar-Free) 200 milliGRAM(s) Oral every 6 hours PRN Cough  melatonin 3 milliGRAM(s) Oral at bedtime PRN Insomnia  ondansetron Injectable 4 milliGRAM(s) IV Push every 8 hours PRN Nausea and/or Vomiting      Allergies    No Known Allergies    Intolerances        Vital Signs Last 24 Hrs  T(C): 36.7 (15 Juan 2022 08:07), Max: 38.1 (14 Jun 2022 17:25)  T(F): 98.1 (15 Ujan 2022 08:07), Max: 100.6 (14 Jun 2022 17:25)  HR: 80 (15 Juan 2022 08:07) (77 - 113)  BP: 98/52 (15 Juan 2022 08:07) (98/52 - 173/91)  BP(mean): --  RR: 18 (15 Juan 2022 08:07) (18 - 20)  SpO2: 97% (15 Juan 2022 08:07) (93% - 98%)    I&O's Summary    14 Jun 2022 07:01  -  15 Juan 2022 07:00  --------------------------------------------------------  IN: 0 mL / OUT: 1 mL / NET: -1 mL          PHYSICAL EXAM:  TELE: Af  Constitutional: NAD, opens eyes to verbal stimuli   HEENT: Moist Mucous Membranes, Anicteric  Pulmonary: Non-labored, breath sounds are clear bilaterally, No wheezing, crackles or rhonchi  Cardiovascular: IRRegular, S1 and S2 nl, No murmurs, rubs, gallops or clicks  Gastrointestinal: Bowel Sounds present, soft, nontender.   Lymph: No lymphadenopathy. No peripheral edema.  Skin: No visible rashes or ulcers.  Psych:  TBI unable to assess   LABS: All Labs Reviewed:                        12.9   8.66  )-----------( 156      ( 15 Juan 2022 08:04 )             40.0                         14.7   11.44 )-----------( 159      ( 14 Jun 2022 11:03 )             45.7     15 Juan 2022 08:04    141    |  108    |  19     ----------------------------<  129    4.1     |  25     |  1.10   14 Jun 2022 16:29    x      |  x      |  x      ----------------------------<  x      x       |  x      |  0.99   14 Jun 2022 11:03    140    |  106    |  18     ----------------------------<  154    4.3     |  26     |  1.20     Ca    8.5        15 Juan 2022 08:04  Ca    8.8        14 Jun 2022 11:03  Phos  2.9       15 Juan 2022 08:04  Mg     2.2       15 Juan 2022 08:04    TPro  6.0    /  Alb  3.0    /  TBili  0.4    /  DBili  0.2    /  AST  12     /  ALT  18     /  AlkPhos  87     15 Juan 2022 08:04  TPro  6.0    /  Alb  3.1    /  TBili  0.5    /  DBili  0.2    /  AST  9      /  ALT  18     /  AlkPhos  96     14 Jun 2022 16:29  TPro  6.8    /  Alb  3.6    /  TBili  0.5    /  DBili  x      /  AST  11     /  ALT  22     /  AlkPhos  111    14 Jun 2022 11:03    PT/INR - ( 15 Juan 2022 08:04 )   PT: 15.6 sec;   INR: 1.33 ratio         PTT - ( 14 Jun 2022 11:03 )  PTT:37.0 sec         ECG:  < from: 12 Lead ECG (06.14.22 @ 10:15) >  Ventricular Rate 123 BPM    QRS Duration 78 ms    Q-T Interval 298 ms    QTC Calculation(Bazett) 426 ms    R Axis 200 degrees    T Axis 5 degrees    Diagnosis Line Atrial fibrillation with rapid ventricular response  Right superior axis deviation  Right ventricular hypertrophy  Septal infarct (cited on or before 22-MAY-2022)  Abnormal ECG  When compared with ECG of 22-MAY-2022 14:51,  No significant change was found         [facility] -- Janak Edwards, Malcom, Alexey, Agus Sepulveda Savella  Office # [phone]      Follow Up:    afib   Subjective/Observations:   Patient seen and examined. Events noted. Resting comfortably in bed. No complaints of chest pain, dyspnea, or palpitations reported. No signs of orthopnea or PND.     REVIEW OF SYSTEMS: ROS as listed otherwise neg    PAST MEDICAL & SURGICAL HISTORY:  TBI (traumatic brain injury)      HTN (hypertension)      Atrial fibrillation      Peripheral neuropathy      Major depression      HLD (hyperlipidemia)      CAD (coronary artery disease)      BPH (benign prostatic hyperplasia)      No significant past surgical history          MEDICATIONS  (STANDING):  apixaban 2.5 milliGRAM(s) Oral two times a day  aspirin  chewable 81 milliGRAM(s) Oral daily  atorvastatin 40 milliGRAM(s) Oral at bedtime  bethanechol 10 milliGRAM(s) Oral three times a day  buPROPion XL (24-Hour) . 150 milliGRAM(s) Oral daily  cholecalciferol 1000 Unit(s) Oral daily  diltiazem    Tablet 90 milliGRAM(s) Oral every 6 hours  gabapentin 300 milliGRAM(s) Oral three times a day  hydrALAZINE 50 milliGRAM(s) Oral two times a day  latanoprost 0.005% Ophthalmic Solution 1 Drop(s) Both EYES at bedtime  losartan 100 milliGRAM(s) Oral daily  methylphenidate 20 milliGRAM(s) Oral daily  metoprolol tartrate 25 milliGRAM(s) Oral every 6 hours  polyethylene glycol 3350 17 Gram(s) Oral two times a day  remdesivir  IVPB   IV Intermittent   remdesivir  IVPB 100 milliGRAM(s) IV Intermittent every 24 hours  senna 2 Tablet(s) Oral at bedtime  tamsulosin 0.4 milliGRAM(s) Oral at bedtime  tiZANidine 4 milliGRAM(s) Oral three times a day    MEDICATIONS  (PRN):  acetaminophen     Tablet .. 650 milliGRAM(s) Oral every 6 hours PRN Temp greater or equal to 38C (100.4F), Mild Pain (1 - 3)  aluminum hydroxide/magnesium hydroxide/simethicone Suspension 30 milliLiter(s) Oral every 4 hours PRN Dyspepsia  guaiFENesin Oral Liquid (Sugar-Free) 200 milliGRAM(s) Oral every 6 hours PRN Cough  melatonin 3 milliGRAM(s) Oral at bedtime PRN Insomnia  ondansetron Injectable 4 milliGRAM(s) IV Push every 8 hours PRN Nausea and/or Vomiting      Allergies    No Known Allergies    Intolerances        Vital Signs Last 24 Hrs  T(C): 36.7 (15 Juan 2022 08:07), Max: 38.1 (14 Jun 2022 17:25)  T(F): 98.1 (15 Juan 2022 08:07), Max: 100.6 (14 Jun 2022 17:25)  HR: 80 (15 Juan 2022 08:07) (77 - 113)  BP: 98/52 (15 Juan 2022 08:07) (98/52 - 173/91)  BP(mean): --  RR: 18 (15 Juan 2022 08:07) (18 - 20)  SpO2: 97% (15 Juan 2022 08:07) (93% - 98%)    I&O's Summary    14 Jun 2022 07:01  -  15 Juan 2022 07:00  --------------------------------------------------------  IN: 0 mL / OUT: 1 mL / NET: -1 mL          PHYSICAL EXAM:  TELE: Af  Constitutional: NAD, opens eyes to verbal stimuli   HEENT: Moist Mucous Membranes, Anicteric  Pulmonary: Non-labored, breath sounds are clear bilaterally, No wheezing, crackles or rhonchi  Cardiovascular: IRRegular, S1 and S2 nl, No murmurs, rubs, gallops or clicks  Gastrointestinal: Bowel Sounds present, soft, nontender.   Lymph: No lymphadenopathy. No peripheral edema.  Skin: No visible rashes or ulcers.  Psych:  appropriate mood and affect   LABS: All Labs Reviewed:                        12.9   8.66  )-----------( 156      ( 15 Juan 2022 08:04 )             40.0                         14.7   11.44 )-----------( 159      ( 14 Jun 2022 11:03 )             45.7     15 Juan 2022 08:04    141    |  108    |  19     ----------------------------<  129    4.1     |  25     |  1.10   14 Jun 2022 16:29    x      |  x      |  x      ----------------------------<  x      x       |  x      |  0.99   14 Jun 2022 11:03    140    |  106    |  18     ----------------------------<  154    4.3     |  26     |  1.20     Ca    8.5        15 Juan 2022 08:04  Ca    8.8        14 Jun 2022 11:03  Phos  2.9       15 Juan 2022 08:04  Mg     2.2       15 Juan 2022 08:04    TPro  6.0    /  Alb  3.0    /  TBili  0.4    /  DBili  0.2    /  AST  12     /  ALT  18     /  AlkPhos  87     15 Juan 2022 08:04  TPro  6.0    /  Alb  3.1    /  TBili  0.5    /  DBili  0.2    /  AST  9      /  ALT  18     /  AlkPhos  96     14 Jun 2022 16:29  TPro  6.8    /  Alb  3.6    /  TBili  0.5    /  DBili  x      /  AST  11     /  ALT  22     /  AlkPhos  111    14 Jun 2022 11:03    PT/INR - ( 15 Juan 2022 08:04 )   PT: 15.6 sec;   INR: 1.33 ratio         PTT - ( 14 Jun 2022 11:03 )  PTT:37.0 sec         ECG:  < from: 12 Lead ECG (06.14.22 @ 10:15) >  Ventricular Rate 123 BPM    QRS Duration 78 ms    Q-T Interval 298 ms    QTC Calculation(Bazett) 426 ms    R Axis 200 degrees    T Axis 5 degrees    Diagnosis Line Atrial fibrillation with rapid ventricular response  Right superior axis deviation  Right ventricular hypertrophy  Septal infarct (cited on or before 22-MAY-2022)  Abnormal ECG  When compared with ECG of 22-MAY-2022 14:51,  No significant change was found

## 2022-06-15 NOTE — PROGRESS NOTE ADULT - PROBLEM SELECTOR PLAN 7
BM yesterday, however previous BM was 1 week ago. Abdomen distended but soft on exam.  - c/w miralax BID + senna. BM yesterday, however previous BM was 1 week ago. Abdomen distended but soft on exam.  - c/w Miralax BID + senna.

## 2022-06-15 NOTE — PROGRESS NOTE ADULT - SUBJECTIVE AND OBJECTIVE BOX
Patient is a 72y old  Male who presents with a chief complaint of COVID (14 Jun 2022 16:26)      HPI:  Pt is a 72 y.o. M with a PMH of HTN, AFIB, BPH, hx of TBI, HLD, CAD, peripheral neuropathy, found to be COVID+ who presented to the ED with CC of fatigue. Pt is not the best historian after having TBI 2/2 fall on AC, so history taken via phone from wife. Pt reports that this morning he woke up "feeling lousy" with a fever and a cough. Pt tested COVID + today. Pt restarted eliquis 2 days ago, and follows up with Dr. Goldsmith for cardiology while outpatient. Pt endorses fever, lethargy diminished sensation on hands and feet 2/2 neuropathy, loss of balance, cough, constipation (last BM yesterday, but before that 1 week prior). Denies CP, palpitations, n/v/d, abdominal pain, leg pain. as per wife pt had some friends who visited pt & pt also has 2 HHA .    In the ED:  T 101.2 F Oral  /90 RR 18 SpO2 98% on RA  WBC 11.44 INR 1.3 Glucose 154 Procal 0.11 COVID +  CT Head Non-Con: Mild to moderate chronic microvascular changes. 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   CXR: Lung Clear.  ECG: AFib RVR  2L NS Bolus x1, 1g IV Tylenol, 1g Rocephin (14 Jun 2022 13:13)      INTERVAL HPI:  6/15/22:    OVERNIGHT EVENTS: none    Home Medications:  aspirin 81 mg oral tablet: 1 tab(s) orally once a day (14 Jun 2022 13:52)  bethanechol 10 mg oral tablet: 1 tab(s) orally 3 times a day at (8am, 3pm, 10pm) (14 Jun 2022 13:52)  buPROPion 75 mg oral tablet: 1 tab(s) orally 2 times a day (10am, 10pm) (14 Jun 2022 13:52)  Eliquis 2.5 mg oral tablet: 1 tab(s) orally 2 times a day (10am, 10pm)t (14 Jun 2022 13:52)  eplerenone 25 mg oral tablet: 1 tab(s) orally once a day (14 Jun 2022 13:52)  gabapentin 300 mg oral capsule: 1 cap(s) orally 3 times a day (8am, 3pm, 10pm (14 Jun 2022 13:52)  hydrALAZINE 50 mg oral tablet: 1 tab(s) orally 2 times a day (14 Jun 2022 13:52)  latanoprost 0.005% ophthalmic solution: 1 drop(s) to each affected eye once a day (in the evening) (14 Jun 2022 13:52)  losartan 100 mg oral tablet: 1 tab(s) orally once a day (14 Jun 2022 13:52)  methylphenidate 10 mg oral tablet: 2 tab(s) orally once a day (in the morning) (14 Jun 2022 13:52)  rosuvastatin 10 mg oral tablet: 1 tab(s) orally once a day (14 Jun 2022 13:52)  tamsulosin 0.4 mg oral capsule: 1 cap(s) orally once a day (in the evening) (14 Jun 2022 13:52)  Tiadylt  mg/24 hours oral capsule, extended release: 1 cap(s) orally once a day (14 Jun 2022 13:52)  tiZANidine 4 mg oral tablet: 1 tab(s) orally 3 times a day (14 Jun 2022 13:52)  Vitamin D3 25 mcg (1000 intl units) oral tablet: 1 tab(s) orally once a day (14 Jun 2022 13:52)      MEDICATIONS  (STANDING):  apixaban 2.5 milliGRAM(s) Oral two times a day  aspirin  chewable 81 milliGRAM(s) Oral daily  atorvastatin 40 milliGRAM(s) Oral at bedtime  bethanechol 10 milliGRAM(s) Oral three times a day  buPROPion XL (24-Hour) . 150 milliGRAM(s) Oral daily  cholecalciferol 1000 Unit(s) Oral daily  diltiazem    Tablet 90 milliGRAM(s) Oral every 6 hours  gabapentin 300 milliGRAM(s) Oral three times a day  hydrALAZINE 50 milliGRAM(s) Oral two times a day  latanoprost 0.005% Ophthalmic Solution 1 Drop(s) Both EYES at bedtime  losartan 100 milliGRAM(s) Oral daily  methylphenidate 20 milliGRAM(s) Oral daily  metoprolol tartrate 25 milliGRAM(s) Oral every 6 hours  polyethylene glycol 3350 17 Gram(s) Oral two times a day  remdesivir  IVPB   IV Intermittent   remdesivir  IVPB 100 milliGRAM(s) IV Intermittent every 24 hours  senna 2 Tablet(s) Oral at bedtime  tamsulosin 0.4 milliGRAM(s) Oral at bedtime  tiZANidine 4 milliGRAM(s) Oral three times a day    MEDICATIONS  (PRN):  acetaminophen     Tablet .. 650 milliGRAM(s) Oral every 6 hours PRN Temp greater or equal to 38C (100.4F), Mild Pain (1 - 3)  aluminum hydroxide/magnesium hydroxide/simethicone Suspension 30 milliLiter(s) Oral every 4 hours PRN Dyspepsia  guaiFENesin Oral Liquid (Sugar-Free) 200 milliGRAM(s) Oral every 6 hours PRN Cough  melatonin 3 milliGRAM(s) Oral at bedtime PRN Insomnia  ondansetron Injectable 4 milliGRAM(s) IV Push every 8 hours PRN Nausea and/or Vomiting      No Known Allergies      Social History:  Vaccinated for COVID 3/3, last shot in february with moderna. Ambulates without assistance- pt is bedbound and wheelchair bound. Pt is reliant on wife for ADLs. Denies alcohol, tobacco, or recreational drug use. (14 Jun 2022 13:13)      REVIEW OF SYSTEMS:  CONSTITUTIONAL: No fever, No chills, No fatigue, No myalgia, No Body ache, No Weakness  EYES: No eye pain,  No visual disturbances, No discharge, No Redness  ENMT: No ear pain, No nose bleed, No vertigo; No sinus pain, No throat pain, No Congestion  NECK: No pain, No stiffness  RESPIRATORY: No cough, No wheezing, No hemoptysis, No shortness of breath  CARDIOVASCULAR: No chest pain, No palpitations  GASTROINTESTINAL: No abdominal pain, No epigastric pain. No nausea, No vomiting, No diarrhea, No constipation; [  ] BM  GENITOURINARY: No dysuria, No frequency, No urgency, No hematuria, No incontinence  NEUROLOGICAL: No headaches, No dizziness, No numbness, No tingling, No tremors, No weakness  EXTREMITIES: No Swelling, No Pain, No Edema  SKIN: [  ] No itching, burning, rashes, or lesions   MUSCULOSKELETAL: No joint pain, No joint swelling; No muscle pain, No back pain, No extremity pain  PSYCHIATRIC: No depression, No anxiety, No mood swings, No difficulty sleeping at night  PAIN SCALE: [  ] None  [  ] Other-  ROS Unable to obtain due to: [  ] Dementia  [  ] Lethargy  [  ] Sedated  [  ] Non verbal  REST OF REVIEW OF SYSTEMS: [  ] Normal     Vital Signs Last 24 Hrs  T(C): 36.7 (15 Juan 2022 08:07), Max: 38.4 (14 Jun 2022 09:53)  T(F): 98.1 (15 Juan 2022 08:07), Max: 101.2 (14 Jun 2022 09:53)  HR: 80 (15 Juan 2022 08:07) (77 - 120)  BP: 98/52 (15 Juan 2022 08:07) (98/52 - 173/91)  BP(mean): --  RR: 18 (15 Juan 2022 08:07) (18 - 20)  SpO2: 97% (15 Juan 2022 08:07) (93% - 98%)    CAPILLARY BLOOD GLUCOSE          I&O's Summary    14 Jun 2022 07:01  -  15 Juan 2022 07:00  --------------------------------------------------------  IN: 0 mL / OUT: 1 mL / NET: -1 mL      PHYSICAL EXAM:  GENERAL:  [  ] NAD, [  ] Well appearing, [  ] Agitated, [  ] Drowsy, [  ] Lethargy, [  ] Confused   HEAD:  [  ] Normal, [  ] Other  EYES:  [  ] EOMI, [  ] PERRLA, [  ] Conjunctiva and sclera clear normal, [  ] Other, [  ] Pallor, [  ] Discharge  ENMT:  [  ] Normal, [  ] Moist mucous membranes, [  ] Good dentition, [  ] No thrush  NECK:  [  ] Supple, [  ] No JVD, [  ] Normal thyroid, [  ] Lymphadenopathy, [  ] Other  CHEST/LUNG:  [  ] Clear to auscultation bilaterally, [  ] Breath Sounds equal B/L / decreased, [  ] Poor effort, [  ] No rales, [  ] No rhonchi, [  ] No wheezing  HEART:  [  ] Regular rate and rhythm, [  ] Tachycardia, [  ] Bradycardia, [  ] Irregular, [  ] No murmurs, No rubs, No gallops, [  ] PPM in place (Mfr:  )  ABDOMEN:  [  ] Soft, [  ] Nontender, [  ] Nondistended, [  ] No mass, [  ] Bowel sounds present, [  ] Obese  NERVOUS SYSTEM:  [  ] Alert & Oriented x3, [  ] Nonfocal, [  ] Confusion, [  ] Encephalopathic, [  ] Sedated, [  ] Unable to assess, [  ] Dementia, [  ] Other-  EXTREMITIES:  [  ] 2+ Peripheral Pulses, No clubbing, No cyanosis,  [  ] Edema B/L lower EXT, [  ] PVD stasis skin changes B/L lower EXT, [  ] Wound  LYMPH:  No lymphadenopathy noted  SKIN:  [  ] No rashes or lesions, [  ] Pressure ulcers, [  ] Ecchymosis, [  ] Skin tears, [  ] Other    DIET: Diet, Regular (06-14-22 @ 14:17)      LABS:                        12.9   8.66  )-----------( 156      ( 15 Juan 2022 08:04 )             40.0     15 Juan 2022 08:04    141    |  108    |  19     ----------------------------<  129    4.1     |  25     |  1.10     Ca    8.5        15 Juan 2022 08:04  Phos  2.9       15 Juan 2022 08:04  Mg     2.2       15 Juan 2022 08:04    TPro  6.0    /  Alb  3.0    /  TBili  0.4    /  DBili  0.2    /  AST  12     /  ALT  18     /  AlkPhos  87     15 Juan 2022 08:04    PT/INR - ( 15 Juan 2022 08:04 )   PT: 15.6 sec;   INR: 1.33 ratio         PTT - ( 14 Jun 2022 11:03 )  PTT:37.0 sec               Anemia Panel:      Thyroid Panel:                RADIOLOGY & ADDITIONAL TESTS:  < from: CT Head No Cont (06.14.22 @ 11:49) >  IMPRESSION:  Mild to moderate chronic microvascular changes without   evidence of an acute transcortical infarction or hemorrhage. Possible   mild communicating hydrocephalus. Correlate clinically    < end of copied text >    < from: Xray Chest 1 View- PORTABLE-Urgent (06.14.22 @ 11:10) >  IMPRESSION: No acute finding or change.    < end of copied text >      HEALTH ISSUES - PROBLEM Dx:  Atrial fibrillation    HTN (hypertension)    TBI (traumatic brain injury)    2019 novel coronavirus disease (COVID-19)    HLD (hyperlipidemia)    Need for prophylactic measure    BPH (benign prostatic hyperplasia)    CAD (coronary artery disease)    Constipation    Peripheral neuropathy    Major depression    Muscle spasticity          Consultant(s) Notes Reviewed:  [ x ] YES     Care Discussed with [ x ] Consultants, [ x ] Patient, [  ] Family, [  ] HCP, [  ] , [  ] Social Service, [  ] RN, [  ] Physical Therapy, [  ] Palliative Care Team  DVT PPX: [  ] Lovenox, [  ] SC Heparin, [  ] Coumadin, [  ] Xarelto, [ x ] Eliquis, [  ] Pradaxa, [  ] IV Heparin drip, [  ] SCD, [  ] Ambulation, [  ] Contraindicated 2/2 GI Bleed, [  ] Contraindicated 2/2  Bleed, [  ] Contraindicated 2/2 Brain Bleed  Advanced Directive: [ x ] None, [  ] DNR/DNI Patient is a 72y old  Male who presents with a chief complaint of COVID (14 Jun 2022 16:26)      HPI:  Pt is a 72 y.o. M with a PMH of HTN, AFIB, BPH, hx of TBI, HLD, CAD, peripheral neuropathy, found to be COVID+ who presented to the ED with CC of fatigue. Pt is not the best historian after having TBI 2/2 fall on AC, so history taken via phone from wife. Pt reports that this morning he woke up "feeling lousy" with a fever and a cough. Pt tested COVID + today. Pt restarted eliquis 2 days ago, and follows up with Dr. Goldsmith for cardiology while outpatient. Pt endorses fever, lethargy diminished sensation on hands and feet 2/2 neuropathy, loss of balance, cough, constipation (last BM yesterday, but before that 1 week prior). Denies CP, palpitations, n/v/d, abdominal pain, leg pain. as per wife pt had some friends who visited pt & pt also has 2 HHA .    In the ED:  T 101.2 F Oral  /90 RR 18 SpO2 98% on RA  WBC 11.44 INR 1.3 Glucose 154 Procal 0.11 COVID +  CT Head Non-Con: Mild to moderate chronic microvascular changes. 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   CXR: Lung Clear.  ECG: AFib RVR  2L NS Bolus x1, 1g IV Tylenol, 1g Rocephin (14 Jun 2022 13:13)      INTERVAL HPI:  6/15/22:     OVERNIGHT EVENTS: none    Home Medications:  aspirin 81 mg oral tablet: 1 tab(s) orally once a day (14 Jun 2022 13:52)  bethanechol 10 mg oral tablet: 1 tab(s) orally 3 times a day at (8am, 3pm, 10pm) (14 Jun 2022 13:52)  buPROPion 75 mg oral tablet: 1 tab(s) orally 2 times a day (10am, 10pm) (14 Jun 2022 13:52)  Eliquis 2.5 mg oral tablet: 1 tab(s) orally 2 times a day (10am, 10pm)t (14 Jun 2022 13:52)  eplerenone 25 mg oral tablet: 1 tab(s) orally once a day (14 Jun 2022 13:52)  gabapentin 300 mg oral capsule: 1 cap(s) orally 3 times a day (8am, 3pm, 10pm (14 Jun 2022 13:52)  hydrALAZINE 50 mg oral tablet: 1 tab(s) orally 2 times a day (14 Jun 2022 13:52)  latanoprost 0.005% ophthalmic solution: 1 drop(s) to each affected eye once a day (in the evening) (14 Jun 2022 13:52)  losartan 100 mg oral tablet: 1 tab(s) orally once a day (14 Jun 2022 13:52)  methylphenidate 10 mg oral tablet: 2 tab(s) orally once a day (in the morning) (14 Jun 2022 13:52)  rosuvastatin 10 mg oral tablet: 1 tab(s) orally once a day (14 Jun 2022 13:52)  tamsulosin 0.4 mg oral capsule: 1 cap(s) orally once a day (in the evening) (14 Jun 2022 13:52)  Tiadylt  mg/24 hours oral capsule, extended release: 1 cap(s) orally once a day (14 Jun 2022 13:52)  tiZANidine 4 mg oral tablet: 1 tab(s) orally 3 times a day (14 Jun 2022 13:52)  Vitamin D3 25 mcg (1000 intl units) oral tablet: 1 tab(s) orally once a day (14 Jun 2022 13:52)      MEDICATIONS  (STANDING):  apixaban 2.5 milliGRAM(s) Oral two times a day  aspirin  chewable 81 milliGRAM(s) Oral daily  atorvastatin 40 milliGRAM(s) Oral at bedtime  bethanechol 10 milliGRAM(s) Oral three times a day  buPROPion XL (24-Hour) . 150 milliGRAM(s) Oral daily  cholecalciferol 1000 Unit(s) Oral daily  diltiazem    Tablet 90 milliGRAM(s) Oral every 6 hours  gabapentin 300 milliGRAM(s) Oral three times a day  hydrALAZINE 50 milliGRAM(s) Oral two times a day  latanoprost 0.005% Ophthalmic Solution 1 Drop(s) Both EYES at bedtime  losartan 100 milliGRAM(s) Oral daily  methylphenidate 20 milliGRAM(s) Oral daily  metoprolol tartrate 25 milliGRAM(s) Oral every 6 hours  polyethylene glycol 3350 17 Gram(s) Oral two times a day  remdesivir  IVPB   IV Intermittent   remdesivir  IVPB 100 milliGRAM(s) IV Intermittent every 24 hours  senna 2 Tablet(s) Oral at bedtime  tamsulosin 0.4 milliGRAM(s) Oral at bedtime  tiZANidine 4 milliGRAM(s) Oral three times a day    MEDICATIONS  (PRN):  acetaminophen     Tablet .. 650 milliGRAM(s) Oral every 6 hours PRN Temp greater or equal to 38C (100.4F), Mild Pain (1 - 3)  aluminum hydroxide/magnesium hydroxide/simethicone Suspension 30 milliLiter(s) Oral every 4 hours PRN Dyspepsia  guaiFENesin Oral Liquid (Sugar-Free) 200 milliGRAM(s) Oral every 6 hours PRN Cough  melatonin 3 milliGRAM(s) Oral at bedtime PRN Insomnia  ondansetron Injectable 4 milliGRAM(s) IV Push every 8 hours PRN Nausea and/or Vomiting      No Known Allergies      Social History:  Vaccinated for COVID 3/3, last shot in february with moderna. Ambulates without assistance- pt is bedbound and wheelchair bound. Pt is reliant on wife for ADLs. Denies alcohol, tobacco, or recreational drug use. (14 Jun 2022 13:13)      REVIEW OF SYSTEMS:  CONSTITUTIONAL: No fever, No chills, No fatigue, No myalgia, No Body ache, No Weakness  EYES: No eye pain,  No visual disturbances, No discharge, No Redness  ENMT: No ear pain, No nose bleed, No vertigo; No sinus pain, No throat pain, No Congestion  NECK: No pain, No stiffness  RESPIRATORY: No cough, No wheezing, No hemoptysis, No shortness of breath  CARDIOVASCULAR: No chest pain, No palpitations  GASTROINTESTINAL: No abdominal pain, No epigastric pain. No nausea, No vomiting, No diarrhea, No constipation; [  ] BM  GENITOURINARY: No dysuria, No frequency, No urgency, No hematuria, No incontinence  NEUROLOGICAL: No headaches, No dizziness, No numbness, No tingling, No tremors, No weakness  EXTREMITIES: No Swelling, No Pain, No Edema  SKIN: [  ] No itching, burning, rashes, or lesions   MUSCULOSKELETAL: No joint pain, No joint swelling; No muscle pain, No back pain, No extremity pain  PSYCHIATRIC: No depression, No anxiety, No mood swings, No difficulty sleeping at night  PAIN SCALE: [  ] None  [  ] Other-  ROS Unable to obtain due to: [  ] Dementia  [  ] Lethargy  [  ] Sedated  [  ] Non verbal  REST OF REVIEW OF SYSTEMS: [  ] Normal     Vital Signs Last 24 Hrs  T(C): 36.7 (15 Juan 2022 08:07), Max: 38.4 (14 Jun 2022 09:53)  T(F): 98.1 (15 Juan 2022 08:07), Max: 101.2 (14 Jun 2022 09:53)  HR: 80 (15 Juan 2022 08:07) (77 - 120)  BP: 98/52 (15 Juan 2022 08:07) (98/52 - 173/91)  BP(mean): --  RR: 18 (15 Juan 2022 08:07) (18 - 20)  SpO2: 97% (15 Juan 2022 08:07) (93% - 98%)    CAPILLARY BLOOD GLUCOSE          I&O's Summary    14 Jun 2022 07:01  -  15 Juan 2022 07:00  --------------------------------------------------------  IN: 0 mL / OUT: 1 mL / NET: -1 mL      PHYSICAL EXAM:  GENERAL:  [  ] NAD, [  ] Well appearing, [  ] Agitated, [  ] Drowsy, [  ] Lethargy, [  ] Confused   HEAD:  [  ] Normal, [  ] Other  EYES:  [  ] EOMI, [  ] PERRLA, [  ] Conjunctiva and sclera clear normal, [  ] Other, [  ] Pallor, [  ] Discharge  ENMT:  [  ] Normal, [  ] Moist mucous membranes, [  ] Good dentition, [  ] No thrush  NECK:  [  ] Supple, [  ] No JVD, [  ] Normal thyroid, [  ] Lymphadenopathy, [  ] Other  CHEST/LUNG:  [  ] Clear to auscultation bilaterally, [  ] Breath Sounds equal B/L / decreased, [  ] Poor effort, [  ] No rales, [  ] No rhonchi, [  ] No wheezing  HEART:  [  ] Regular rate and rhythm, [  ] Tachycardia, [  ] Bradycardia, [  ] Irregular, [  ] No murmurs, No rubs, No gallops, [  ] PPM in place (Mfr:  )  ABDOMEN:  [  ] Soft, [  ] Nontender, [  ] Nondistended, [  ] No mass, [  ] Bowel sounds present, [  ] Obese  NERVOUS SYSTEM:  [  ] Alert & Oriented x3, [  ] Nonfocal, [  ] Confusion, [  ] Encephalopathic, [  ] Sedated, [  ] Unable to assess, [  ] Dementia, [  ] Other-  EXTREMITIES:  [  ] 2+ Peripheral Pulses, No clubbing, No cyanosis,  [  ] Edema B/L lower EXT, [  ] PVD stasis skin changes B/L lower EXT, [  ] Wound  LYMPH:  No lymphadenopathy noted  SKIN:  [  ] No rashes or lesions, [  ] Pressure ulcers, [  ] Ecchymosis, [  ] Skin tears, [  ] Other    DIET: Diet, Regular (06-14-22 @ 14:17)      LABS:                        12.9   8.66  )-----------( 156      ( 15 Juan 2022 08:04 )             40.0     15 Juan 2022 08:04    141    |  108    |  19     ----------------------------<  129    4.1     |  25     |  1.10     Ca    8.5        15 Juan 2022 08:04  Phos  2.9       15 Juan 2022 08:04  Mg     2.2       15 Juan 2022 08:04    TPro  6.0    /  Alb  3.0    /  TBili  0.4    /  DBili  0.2    /  AST  12     /  ALT  18     /  AlkPhos  87     15 Juan 2022 08:04    PT/INR - ( 15 Juan 2022 08:04 )   PT: 15.6 sec;   INR: 1.33 ratio         PTT - ( 14 Jun 2022 11:03 )  PTT:37.0 sec               Anemia Panel:      Thyroid Panel:                RADIOLOGY & ADDITIONAL TESTS:  < from: CT Head No Cont (06.14.22 @ 11:49) >  IMPRESSION:  Mild to moderate chronic microvascular changes without   evidence of an acute transcortical infarction or hemorrhage. Possible   mild communicating hydrocephalus. Correlate clinically    < end of copied text >    < from: Xray Chest 1 View- PORTABLE-Urgent (06.14.22 @ 11:10) >  IMPRESSION: No acute finding or change.    < end of copied text >      HEALTH ISSUES - PROBLEM Dx:  Atrial fibrillation    HTN (hypertension)    TBI (traumatic brain injury)    2019 novel coronavirus disease (COVID-19)    HLD (hyperlipidemia)    Need for prophylactic measure    BPH (benign prostatic hyperplasia)    CAD (coronary artery disease)    Constipation    Peripheral neuropathy    Major depression    Muscle spasticity          Consultant(s) Notes Reviewed:  [ x ] YES     Care Discussed with [ x ] Consultants, [ x ] Patient, [  ] Family, [  ] HCP, [  ] , [  ] Social Service, [  ] RN, [  ] Physical Therapy, [  ] Palliative Care Team  DVT PPX: [  ] Lovenox, [  ] SC Heparin, [  ] Coumadin, [  ] Xarelto, [ x ] Eliquis, [  ] Pradaxa, [  ] IV Heparin drip, [  ] SCD, [  ] Ambulation, [  ] Contraindicated 2/2 GI Bleed, [  ] Contraindicated 2/2  Bleed, [  ] Contraindicated 2/2 Brain Bleed  Advanced Directive: [ x ] None, [  ] DNR/DNI Patient is a 72y old  Male who presents with a chief complaint of COVID (14 Jun 2022 16:26)      HPI:  Pt is a 72 y.o. M with a PMH of HTN, AFIB, BPH, hx of TBI, HLD, CAD, peripheral neuropathy, found to be COVID+ who presented to the ED with CC of fatigue. Pt is not the best historian after having TBI 2/2 fall on AC, so history taken via phone from wife. Pt reports that this morning he woke up "feeling lousy" with a fever and a cough. Pt tested COVID + today. Pt restarted eliquis 2 days ago, and follows up with Dr. Goldsmith for cardiology while outpatient. Pt endorses fever, lethargy diminished sensation on hands and feet 2/2 neuropathy, loss of balance, cough, constipation (last BM yesterday, but before that 1 week prior). Denies CP, palpitations, n/v/d, abdominal pain, leg pain. as per wife pt had some friends who visited pt & pt also has 2 HHA .    In the ED:  T 101.2 F Oral  /90 RR 18 SpO2 98% on RA  WBC 11.44 INR 1.3 Glucose 154 Procal 0.11 COVID +  CT Head Non-Con: Mild to moderate chronic microvascular changes. 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   CXR: Lung Clear.  ECG: AFib RVR  2L NS Bolus x1, 1g IV Tylenol, 1g Rocephin (14 Jun 2022 13:13)      INTERVAL HPI:  6/15/22: Patient seen and examined at bedside. No acute events overnight. Patient on BB CCB w/ rate-controlled HR ~80's. On remdesivir for COVID. saturating well on RA. On eliquis for Afib/DVT. Endorses feeling better without significant dyspnea or cough, but has some fatigue/malaise.    OVERNIGHT EVENTS: none    Home Medications:  aspirin 81 mg oral tablet: 1 tab(s) orally once a day (14 Jun 2022 13:52)  bethanechol 10 mg oral tablet: 1 tab(s) orally 3 times a day at (8am, 3pm, 10pm) (14 Jun 2022 13:52)  buPROPion 75 mg oral tablet: 1 tab(s) orally 2 times a day (10am, 10pm) (14 Jun 2022 13:52)  Eliquis 2.5 mg oral tablet: 1 tab(s) orally 2 times a day (10am, 10pm)t (14 Jun 2022 13:52)  eplerenone 25 mg oral tablet: 1 tab(s) orally once a day (14 Jun 2022 13:52)  gabapentin 300 mg oral capsule: 1 cap(s) orally 3 times a day (8am, 3pm, 10pm (14 Jun 2022 13:52)  hydrALAZINE 50 mg oral tablet: 1 tab(s) orally 2 times a day (14 Jun 2022 13:52)  latanoprost 0.005% ophthalmic solution: 1 drop(s) to each affected eye once a day (in the evening) (14 Jun 2022 13:52)  losartan 100 mg oral tablet: 1 tab(s) orally once a day (14 Jun 2022 13:52)  methylphenidate 10 mg oral tablet: 2 tab(s) orally once a day (in the morning) (14 Jun 2022 13:52)  rosuvastatin 10 mg oral tablet: 1 tab(s) orally once a day (14 Jun 2022 13:52)  tamsulosin 0.4 mg oral capsule: 1 cap(s) orally once a day (in the evening) (14 Jun 2022 13:52)  Tiadylt  mg/24 hours oral capsule, extended release: 1 cap(s) orally once a day (14 Jun 2022 13:52)  tiZANidine 4 mg oral tablet: 1 tab(s) orally 3 times a day (14 Jun 2022 13:52)  Vitamin D3 25 mcg (1000 intl units) oral tablet: 1 tab(s) orally once a day (14 Jun 2022 13:52)      MEDICATIONS  (STANDING):  apixaban 2.5 milliGRAM(s) Oral two times a day  aspirin  chewable 81 milliGRAM(s) Oral daily  atorvastatin 40 milliGRAM(s) Oral at bedtime  bethanechol 10 milliGRAM(s) Oral three times a day  buPROPion XL (24-Hour) . 150 milliGRAM(s) Oral daily  cholecalciferol 1000 Unit(s) Oral daily  diltiazem    Tablet 90 milliGRAM(s) Oral every 6 hours  gabapentin 300 milliGRAM(s) Oral three times a day  hydrALAZINE 50 milliGRAM(s) Oral two times a day  latanoprost 0.005% Ophthalmic Solution 1 Drop(s) Both EYES at bedtime  losartan 100 milliGRAM(s) Oral daily  methylphenidate 20 milliGRAM(s) Oral daily  metoprolol tartrate 25 milliGRAM(s) Oral every 6 hours  polyethylene glycol 3350 17 Gram(s) Oral two times a day  remdesivir  IVPB   IV Intermittent   remdesivir  IVPB 100 milliGRAM(s) IV Intermittent every 24 hours  senna 2 Tablet(s) Oral at bedtime  tamsulosin 0.4 milliGRAM(s) Oral at bedtime  tiZANidine 4 milliGRAM(s) Oral three times a day    MEDICATIONS  (PRN):  acetaminophen     Tablet .. 650 milliGRAM(s) Oral every 6 hours PRN Temp greater or equal to 38C (100.4F), Mild Pain (1 - 3)  aluminum hydroxide/magnesium hydroxide/simethicone Suspension 30 milliLiter(s) Oral every 4 hours PRN Dyspepsia  guaiFENesin Oral Liquid (Sugar-Free) 200 milliGRAM(s) Oral every 6 hours PRN Cough  melatonin 3 milliGRAM(s) Oral at bedtime PRN Insomnia  ondansetron Injectable 4 milliGRAM(s) IV Push every 8 hours PRN Nausea and/or Vomiting      No Known Allergies      Social History:  Vaccinated for COVID 3/3, last shot in february with moderna. Ambulates without assistance- pt is bedbound and wheelchair bound. Pt is reliant on wife for ADLs. Denies alcohol, tobacco, or recreational drug use. (14 Jun 2022 13:13)      REVIEW OF SYSTEMS:  CONSTITUTIONAL: No fever, No chills, ADMITS fatigue, No myalgia, No Body ache, ADMITS Weakness  EYES: No eye pain,  No visual disturbances, No discharge, No Redness  ENMT: No ear pain, No nose bleed, No vertigo; No sinus pain, No throat pain, No Congestion  NECK: No pain, No stiffness  RESPIRATORY: No cough, No wheezing, No hemoptysis, No shortness of breath  CARDIOVASCULAR: No chest pain, No palpitations  GASTROINTESTINAL: No abdominal pain, No epigastric pain. No nausea, No vomiting, No diarrhea, No constipation; [  ] BM  GENITOURINARY: No dysuria, No frequency, No urgency, No hematuria, No incontinence  NEUROLOGICAL: No headaches, No dizziness, No numbness, No tingling, No tremors, No weakness  EXTREMITIES: No Swelling, No Pain, No Edema  SKIN: [ x ] No itching, burning, rashes, or lesions   MUSCULOSKELETAL: No joint pain, No joint swelling; No muscle pain, No back pain, No extremity pain  PSYCHIATRIC: No depression, No anxiety, No mood swings, No difficulty sleeping at night  PAIN SCALE: [ x ] None  [  ] Other-  ROS Unable to obtain due to: [  ] Dementia  [  ] Lethargy  [  ] Sedated  [  ] Non verbal  REST OF REVIEW OF SYSTEMS: [ x ] Normal     Vital Signs Last 24 Hrs  T(C): 36.7 (15 Juan 2022 08:07), Max: 38.4 (14 Jun 2022 09:53)  T(F): 98.1 (15 Juan 2022 08:07), Max: 101.2 (14 Jun 2022 09:53)  HR: 80 (15 Juan 2022 08:07) (77 - 120)  BP: 98/52 (15 Juan 2022 08:07) (98/52 - 173/91)  BP(mean): --  RR: 18 (15 Juan 2022 08:07) (18 - 20)  SpO2: 97% (15 Juan 2022 08:07) (93% - 98%)    CAPILLARY BLOOD GLUCOSE          I&O's Summary    14 Jun 2022 07:01  -  15 Juan 2022 07:00  --------------------------------------------------------  IN: 0 mL / OUT: 1 mL / NET: -1 mL      PHYSICAL EXAM:  GENERAL:  [ x ] NAD, [  x] Well appearing, [  ] Agitated, [  ] Drowsy, [  ] Lethargy, [  ] Confused   HEAD:  [ x ] Normal, [  ] Other  EYES:  [ x ] EOMI, [  ] PERRLA, [x  ] Conjunctiva and sclera clear normal, [  ] Other, [  ] Pallor, [  ] Discharge  ENMT:  [ x ] Normal, [ x ] Moist mucous membranes, [ x ] Good dentition, [  ] No thrush  NECK:  [ x ] Supple, [ x ] No JVD, [  ] Normal thyroid, [  ] Lymphadenopathy, [  ] Other  CHEST/LUNG:  [ x ] Clear to auscultation bilaterally, [ x ] Breath Sounds equal B/L  [  ] Poor effort, [x  ] No rales, [  x] No rhonchi, [ x ] No wheezing  HEART:  [ x ] Regular rate, [  ] Tachycardia, [  ] Bradycardia, [ x ] Irregular, [x  ] No murmurs, No rubs, No gallops, [  ] PPM in place (Mfr:  )  ABDOMEN:  [ x ] Soft, [x  ] Nontender, [ x ] Nondistended, [  ] No mass, [ x ] Bowel sounds present, [  ] Obese  NERVOUS SYSTEM:  [ x ] Alert & Oriented x3, [ x ] Nonfocal, [  ] Confusion, [  ] Encephalopathic, [  ] Sedated, [  ] Unable to assess, [  ] Dementia, [  ] Other-  EXTREMITIES:  [ x ] 2+ Peripheral Pulses, No clubbing, No cyanosis,  [  ] Edema B/L lower EXT, [  ] PVD stasis skin changes B/L lower EXT, [  ] Wound [ x ] spasticity of all 4 extremities with prominent contractures of extensors of hands b/l  LYMPH:  No lymphadenopathy noted  SKIN:  [  ] No rashes or lesions, [  ] Pressure ulcers, [  ] Ecchymosis, [  ] Skin tears, [  ] Other    DIET: Diet, Regular (06-14-22 @ 14:17)      LABS:                        12.9   8.66  )-----------( 156      ( 15 Juan 2022 08:04 )             40.0     15 Juan 2022 08:04    141    |  108    |  19     ----------------------------<  129    4.1     |  25     |  1.10     Ca    8.5        15 Juan 2022 08:04  Phos  2.9       15 Juan 2022 08:04  Mg     2.2       15 Juan 2022 08:04    TPro  6.0    /  Alb  3.0    /  TBili  0.4    /  DBili  0.2    /  AST  12     /  ALT  18     /  AlkPhos  87     15 Juan 2022 08:04    PT/INR - ( 15 Juan 2022 08:04 )   PT: 15.6 sec;   INR: 1.33 ratio         PTT - ( 14 Jun 2022 11:03 )  PTT:37.0 sec               Anemia Panel:      Thyroid Panel:                RADIOLOGY & ADDITIONAL TESTS:  < from: CT Head No Cont (06.14.22 @ 11:49) >  IMPRESSION:  Mild to moderate chronic microvascular changes without   evidence of an acute transcortical infarction or hemorrhage. Possible   mild communicating hydrocephalus. Correlate clinically    < end of copied text >    < from: Xray Chest 1 View- PORTABLE-Urgent (06.14.22 @ 11:10) >  IMPRESSION: No acute finding or change.    < end of copied text >      HEALTH ISSUES - PROBLEM Dx:  Atrial fibrillation    HTN (hypertension)    TBI (traumatic brain injury)    2019 novel coronavirus disease (COVID-19)    HLD (hyperlipidemia)    Need for prophylactic measure    BPH (benign prostatic hyperplasia)    CAD (coronary artery disease)    Constipation    Peripheral neuropathy    Major depression    Muscle spasticity          Consultant(s) Notes Reviewed:  [ x ] YES     Care Discussed with [ x ] Consultants, [ x ] Patient, [  ] Family, [  ] HCP, [  ] , [  ] Social Service, [  ] RN, [  ] Physical Therapy, [  ] Palliative Care Team  DVT PPX: [  ] Lovenox, [  ] SC Heparin, [  ] Coumadin, [  ] Xarelto, [ x ] Eliquis, [  ] Pradaxa, [  ] IV Heparin drip, [  ] SCD, [  ] Ambulation, [  ] Contraindicated 2/2 GI Bleed, [  ] Contraindicated 2/2  Bleed, [  ] Contraindicated 2/2 Brain Bleed  Advanced Directive: [ x ] None, [  ] DNR/DNI Patient is a 72y old  Male who presents with a chief complaint of COVID (14 Jun 2022 16:26)      HPI:  Pt is a 72 y.o. M with a PMH of HTN, AFIB, BPH, hx of TBI, HLD, CAD, peripheral neuropathy, found to be COVID+ who presented to the ED with CC of fatigue. Pt is not the best historian after having TBI 2/2 fall on AC, so history taken via phone from wife. Pt reports that this morning he woke up "feeling lousy" with a fever and a cough. Pt tested COVID + today. Pt restarted eliquis 2 days ago, and follows up with Dr. Goldsmith for cardiology while outpatient. Pt endorses fever, lethargy diminished sensation on hands and feet 2/2 neuropathy, loss of balance, cough, constipation (last BM yesterday, but before that 1 week prior). Denies CP, palpitations, n/v/d, abdominal pain, leg pain. as per wife pt had some friends who visited pt & pt also has 2 HHA .    In the ED:  T 101.2 F Oral  /90 RR 18 SpO2 98% on RA  WBC 11.44 INR 1.3 Glucose 154 Procal 0.11 COVID +  CT Head Non-Con: Mild to moderate chronic microvascular changes. 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   CXR: Lung Clear.  ECG: AFib RVR  2L NS Bolus x1, 1g IV Tylenol, 1g Rocephin (14 Jun 2022 13:13)      INTERVAL HPI:  6/15/22: Patient seen and examined at bedside. No acute events overnight. Patient on BB CCB w/ rate-controlled HR ~80's. On remdesivir for COVID. saturating well on RA. On eliquis for Afib/DVT. Endorses feeling better without significant dyspnea or cough, but has some fatigue/malaise. ON Remdisivir IVPB daily     OVERNIGHT EVENTS: none    Home Medications:  aspirin 81 mg oral tablet: 1 tab(s) orally once a day (14 Jun 2022 13:52)  bethanechol 10 mg oral tablet: 1 tab(s) orally 3 times a day at (8am, 3pm, 10pm) (14 Jun 2022 13:52)  buPROPion 75 mg oral tablet: 1 tab(s) orally 2 times a day (10am, 10pm) (14 Jun 2022 13:52)  Eliquis 2.5 mg oral tablet: 1 tab(s) orally 2 times a day (10am, 10pm)t (14 Jun 2022 13:52)  eplerenone 25 mg oral tablet: 1 tab(s) orally once a day (14 Jun 2022 13:52)  gabapentin 300 mg oral capsule: 1 cap(s) orally 3 times a day (8am, 3pm, 10pm (14 Jun 2022 13:52)  hydrALAZINE 50 mg oral tablet: 1 tab(s) orally 2 times a day (14 Jun 2022 13:52)  latanoprost 0.005% ophthalmic solution: 1 drop(s) to each affected eye once a day (in the evening) (14 Jun 2022 13:52)  losartan 100 mg oral tablet: 1 tab(s) orally once a day (14 Jun 2022 13:52)  methylphenidate 10 mg oral tablet: 2 tab(s) orally once a day (in the morning) (14 Jun 2022 13:52)  rosuvastatin 10 mg oral tablet: 1 tab(s) orally once a day (14 Jun 2022 13:52)  tamsulosin 0.4 mg oral capsule: 1 cap(s) orally once a day (in the evening) (14 Jun 2022 13:52)  Tiadylt  mg/24 hours oral capsule, extended release: 1 cap(s) orally once a day (14 Jun 2022 13:52)  tiZANidine 4 mg oral tablet: 1 tab(s) orally 3 times a day (14 Jun 2022 13:52)  Vitamin D3 25 mcg (1000 intl units) oral tablet: 1 tab(s) orally once a day (14 Jun 2022 13:52)      MEDICATIONS  (STANDING):  apixaban 2.5 milliGRAM(s) Oral two times a day  aspirin  chewable 81 milliGRAM(s) Oral daily  atorvastatin 40 milliGRAM(s) Oral at bedtime  bethanechol 10 milliGRAM(s) Oral three times a day  buPROPion XL (24-Hour) . 150 milliGRAM(s) Oral daily  cholecalciferol 1000 Unit(s) Oral daily  diltiazem    Tablet 90 milliGRAM(s) Oral every 6 hours  gabapentin 300 milliGRAM(s) Oral three times a day  hydrALAZINE 50 milliGRAM(s) Oral two times a day  latanoprost 0.005% Ophthalmic Solution 1 Drop(s) Both EYES at bedtime  losartan 100 milliGRAM(s) Oral daily  methylphenidate 20 milliGRAM(s) Oral daily  metoprolol tartrate 25 milliGRAM(s) Oral every 6 hours  polyethylene glycol 3350 17 Gram(s) Oral two times a day  remdesivir  IVPB   IV Intermittent   remdesivir  IVPB 100 milliGRAM(s) IV Intermittent every 24 hours  senna 2 Tablet(s) Oral at bedtime  tamsulosin 0.4 milliGRAM(s) Oral at bedtime  tiZANidine 4 milliGRAM(s) Oral three times a day    MEDICATIONS  (PRN):  acetaminophen     Tablet .. 650 milliGRAM(s) Oral every 6 hours PRN Temp greater or equal to 38C (100.4F), Mild Pain (1 - 3)  aluminum hydroxide/magnesium hydroxide/simethicone Suspension 30 milliLiter(s) Oral every 4 hours PRN Dyspepsia  guaiFENesin Oral Liquid (Sugar-Free) 200 milliGRAM(s) Oral every 6 hours PRN Cough  melatonin 3 milliGRAM(s) Oral at bedtime PRN Insomnia  ondansetron Injectable 4 milliGRAM(s) IV Push every 8 hours PRN Nausea and/or Vomiting      No Known Allergies      Social History:  Vaccinated for COVID 3/3, last shot in february with moderna. Ambulates without assistance- pt is bedbound and wheelchair bound. Pt is reliant on wife for ADLs. Denies alcohol, tobacco, or recreational drug use. (14 Jun 2022 13:13)      REVIEW OF SYSTEMS: i am OK  CONSTITUTIONAL: No fever, No chills, ADMITS fatigue, No myalgia, No Body ache, ADMITS Weakness  EYES: No eye pain,  No visual disturbances, No discharge, No Redness  ENMT: No ear pain, No nose bleed, No vertigo; No sinus pain, No throat pain, No Congestion  NECK: No pain, No stiffness  RESPIRATORY: No cough, No wheezing, No hemoptysis, No shortness of breath  CARDIOVASCULAR: No chest pain, No palpitations  GASTROINTESTINAL: No abdominal pain, No epigastric pain. No nausea, No vomiting, No diarrhea, No constipation; [  ] BM  GENITOURINARY: No dysuria, No frequency, No urgency, No hematuria, No incontinence  NEUROLOGICAL: No headaches, No dizziness, No numbness, No tingling, No tremors, No weakness  EXTREMITIES: No Swelling, No Pain, No Edema  SKIN: [ x ] No itching, burning, rashes, or lesions   MUSCULOSKELETAL: No joint pain, No joint swelling; No muscle pain, No back pain, No extremity pain  PSYCHIATRIC: No depression, No anxiety, No mood swings, No difficulty sleeping at night  PAIN SCALE: [ x ] None  [  ] Other-  ROS Unable to obtain due to: [  ] Dementia  [  ] Lethargy  [  ] Sedated  [  ] Non verbal  REST OF REVIEW OF SYSTEMS: [ x ] Normal     Vital Signs Last 24 Hrs  T(C): 36.7 (15 Juan 2022 08:07), Max: 38.4 (14 Jun 2022 09:53)  T(F): 98.1 (15 Juan 2022 08:07), Max: 101.2 (14 Jun 2022 09:53)  HR: 80 (15 Juan 2022 08:07) (77 - 120)  BP: 98/52 (15 Juan 2022 08:07) (98/52 - 173/91)  BP(mean): --  RR: 18 (15 Juan 2022 08:07) (18 - 20)  SpO2: 97% (15 Juan 2022 08:07) (93% - 98%)    CAPILLARY BLOOD GLUCOSE          I&O's Summary    14 Jun 2022 07:01  -  15 Juan 2022 07:00  --------------------------------------------------------  IN: 0 mL / OUT: 1 mL / NET: -1 mL      PHYSICAL EXAM:  GENERAL:  [ x ] NAD, [  x] Well appearing, [  ] Agitated, [  ] Drowsy, [  ] Lethargy, [  ] Confused   HEAD:  [ x ] Normal, [  ] Other  EYES:  [ x ] EOMI, [  ] PERRLA, [x  ] Conjunctiva and sclera clear normal, [  ] Other, [  ] Pallor, [  ] Discharge  ENMT:  [ x ] Normal, [ x ] Moist mucous membranes, [ x ] Good dentition, [ x ] No thrush  NECK:  [ x ] Supple, [ x ] No JVD, [ x ] Normal thyroid, [  ] Lymphadenopathy, [  ] Other  CHEST/LUNG:  [ x ] Clear to auscultation bilaterally, [ x ] Breath Sounds equal B/L  [  ] Poor effort, [x  ] No rales, [  x] No rhonchi, [ x ] No wheezing  HEART:  [ x ] Regular rate, [  ] Tachycardia, [  ] Bradycardia, [ x ] Irregular, [x  ] No murmurs, No rubs, No gallops, [  ] PPM in place (Mfr:  )  ABDOMEN:  [ x ] Soft, [x  ] Nontender, [ x ] Nondistended, [ x ] No mass, [ x ] Bowel sounds present, [  ] Obese  NERVOUS SYSTEM:  [ x ] Alert & Oriented x3, [ x ] Nonfocal, [  ] Confusion, [  ] Encephalopathic, [  ] Sedated, [  ] Unable to assess, [  ] Dementia, [ x ] Other-contractures EXT & Hand B/L   EXTREMITIES:  [ x ] 2+ Peripheral Pulses, No clubbing, No cyanosis,  [  ] Edema B/L lower EXT, [  ] PVD stasis skin changes B/L lower EXT, [  ] Wound [ x ] spasticity of all 4 extremities with prominent contractures of extensors of hands b/l  LYMPH:  No lymphadenopathy noted  SKIN:  [  ] No rashes or lesions, [  ] Pressure ulcers, [  ] Ecchymosis, [  ] Skin tears, [  ] Other    DIET: Diet, Regular (06-14-22 @ 14:17)      LABS:                        12.9   8.66  )-----------( 156      ( 15 Juan 2022 08:04 )             40.0     15 Juan 2022 08:04    141    |  108    |  19     ----------------------------<  129    4.1     |  25     |  1.10     Ca    8.5        15 Juan 2022 08:04  Phos  2.9       15 Juan 2022 08:04  Mg     2.2       15 Juan 2022 08:04    TPro  6.0    /  Alb  3.0    /  TBili  0.4    /  DBili  0.2    /  AST  12     /  ALT  18     /  AlkPhos  87     15 Juan 2022 08:04    PT/INR - ( 15 Juan 2022 08:04 )   PT: 15.6 sec;   INR: 1.33 ratio         PTT - ( 14 Jun 2022 11:03 )  PTT:37.0 sec      RADIOLOGY & ADDITIONAL TESTS:  < from: CT Head No Cont (06.14.22 @ 11:49) >  IMPRESSION:  Mild to moderate chronic microvascular changes without   evidence of an acute transcortical infarction or hemorrhage. Possible   mild communicating hydrocephalus. Correlate clinically    < end of copied text >    < from: Xray Chest 1 View- PORTABLE-Urgent (06.14.22 @ 11:10) >  IMPRESSION: No acute finding or change.    < end of copied text >      HEALTH ISSUES - PROBLEM Dx:  Atrial fibrillation    HTN (hypertension)    TBI (traumatic brain injury)    2019 novel coronavirus disease (COVID-19)    HLD (hyperlipidemia)    Need for prophylactic measure    BPH (benign prostatic hyperplasia)    CAD (coronary artery disease)    Constipation    Peripheral neuropathy    Major depression    Muscle spasticity          Consultant(s) Notes Reviewed:  [ x ] YES     Care Discussed with [ x ] Consultants, [ x ] Patient, [ x ] Family- wife , [  ] HCP, [x  ] , [x  ] Social Service, [x  ] RN, [  ] Physical Therapy, [  ] Palliative Care Team  DVT PPX: [  ] Lovenox, [  ] SC Heparin, [  ] Coumadin, [  ] Xarelto, [ x ] Eliquis, [  ] Pradaxa, [  ] IV Heparin drip, [  ] SCD, [  ] Ambulation, [  ] Contraindicated 2/2 GI Bleed, [  ] Contraindicated 2/2  Bleed, [  ] Contraindicated 2/2 Brain Bleed  Advanced Directive: [ x ] None, [  ] DNR/DNI

## 2022-06-15 NOTE — PROGRESS NOTE ADULT - PROBLEM SELECTOR PLAN 2
COVID (+) - saturating well on RA  - c/w tylenol 650 mg po q6h prn fever  - c/w supportive care  - c/w albuterol q6h prn  - c/w home Eliquis mg BID  - No steroids at this time  pulm following - Hao - will appreciate recs  ID following - Nigel - will appreciate recs COVID (+) - saturating well on RA  - c/w tylenols 650 mg po q6h prn fever  - c/w supportive care  - c/w albuterol q6h prn  - c/w home Eliquis mg BID  - No steroids at this time  pulm following - Hao - will appreciate recs  ID cruzito - Nigel - started on Remdisivir IVPB Daily

## 2022-06-15 NOTE — PROGRESS NOTE ADULT - PROBLEM SELECTOR PLAN 4
chronic, stable soft  - c/w home losartan 100mg QD and hydralazine 50mg BID with hold parameters  - c/w cardizem 90 mg qd and lopressor 25 mg q6h w/ hold parameters  - hold home eplerenone as per Cardiology .  - monitor routine hemodynamics. chronic, stable soft  - c/w home losartan 100mg QD and hydralazine 50mg BID with hold parameters  - ON Cardizem 90 mg qd and lopressor 25 mg q6h w/ hold parameters  - hold home eplerenone as per Cardiology .  -Cardio Dr MARTÍNEZ wise  - continue Cardizem although has not been getting 2/2 bp- can decrease to 30 every 6 hours for now   -  hold losartan and hydralazine given soft bp , can hold lopressor for now has not been getting 2/2 soft bp chronic, stable soft  - hold home eplerenone as per Cardiology .  -Cardio Dr MARTÍNEZ wise  - continue Cardizem although has not been getting 2/2 bp- can decrease to 30 every 6 hours for now   -  as per cardio hold losartan and hydralazine given lower BPs, hold lopressor for now has not been getting 2/2 low BP

## 2022-06-15 NOTE — PROGRESS NOTE ADULT - SUBJECTIVE AND OBJECTIVE BOX
Date/Time Patient Seen:  		  Referring MD:   Data Reviewed	       Patient is a 72y old  Male who presents with a chief complaint of COVID (14 Jun 2022 16:26)      Subjective/HPI     PAST MEDICAL & SURGICAL HISTORY:  TBI (traumatic brain injury)    HTN (hypertension)    Atrial fibrillation    DM (diabetes mellitus)    Peripheral neuropathy    Major depression    HLD (hyperlipidemia)    CAD (coronary artery disease)    BPH (benign prostatic hyperplasia)    No significant past surgical history          Medication list         MEDICATIONS  (STANDING):  apixaban 2.5 milliGRAM(s) Oral two times a day  aspirin  chewable 81 milliGRAM(s) Oral daily  atorvastatin 40 milliGRAM(s) Oral at bedtime  bethanechol 10 milliGRAM(s) Oral three times a day  buPROPion XL (24-Hour) . 150 milliGRAM(s) Oral daily  cholecalciferol 1000 Unit(s) Oral daily  diltiazem    Tablet 90 milliGRAM(s) Oral every 6 hours  gabapentin 300 milliGRAM(s) Oral three times a day  hydrALAZINE 50 milliGRAM(s) Oral two times a day  latanoprost 0.005% Ophthalmic Solution 1 Drop(s) Both EYES at bedtime  losartan 100 milliGRAM(s) Oral daily  methylphenidate 20 milliGRAM(s) Oral daily  metoprolol tartrate 25 milliGRAM(s) Oral every 6 hours  polyethylene glycol 3350 17 Gram(s) Oral two times a day  remdesivir  IVPB   IV Intermittent   remdesivir  IVPB 100 milliGRAM(s) IV Intermittent every 24 hours  senna 2 Tablet(s) Oral at bedtime  tamsulosin 0.4 milliGRAM(s) Oral at bedtime  tiZANidine 4 milliGRAM(s) Oral three times a day    MEDICATIONS  (PRN):  acetaminophen     Tablet .. 650 milliGRAM(s) Oral every 6 hours PRN Temp greater or equal to 38C (100.4F), Mild Pain (1 - 3)  aluminum hydroxide/magnesium hydroxide/simethicone Suspension 30 milliLiter(s) Oral every 4 hours PRN Dyspepsia  guaiFENesin Oral Liquid (Sugar-Free) 200 milliGRAM(s) Oral every 6 hours PRN Cough  melatonin 3 milliGRAM(s) Oral at bedtime PRN Insomnia  ondansetron Injectable 4 milliGRAM(s) IV Push every 8 hours PRN Nausea and/or Vomiting         Vitals log        ICU Vital Signs Last 24 Hrs  T(C): 36.9 (15 Juan 2022 05:13), Max: 38.4 (14 Jun 2022 09:53)  T(F): 98.4 (15 Juan 2022 05:13), Max: 101.2 (14 Jun 2022 09:53)  HR: 82 (15 Juan 2022 05:13) (77 - 120)  BP: 102/65 (15 Juan 2022 05:13) (102/65 - 173/91)  BP(mean): --  ABP: --  ABP(mean): --  RR: 18 (15 Juan 2022 05:13) (18 - 20)  SpO2: 96% (15 Juan 2022 05:13) (93% - 98%)           Input and Output:  I&O's Detail    14 Jun 2022 07:01  -  15 Juan 2022 06:24  --------------------------------------------------------  IN:  Total IN: 0 mL    OUT:    Stool (mL): 1 mL  Total OUT: 1 mL    Total NET: -1 mL          Lab Data                        14.7   11.44 )-----------( 159      ( 14 Jun 2022 11:03 )             45.7     06-14    x   |  x   |  x   ----------------------------<  x   x    |  x   |  0.99    Ca    8.8      14 Jun 2022 11:03    TPro  6.0  /  Alb  3.1<L>  /  TBili  0.5  /  DBili  0.2  /  AST  9<L>  /  ALT  18  /  AlkPhos  96  06-14            Review of Systems	      Objective     Physical Examination    heart s1s2  lung dec BS  abd soft      Pertinent Lab findings & Imaging      Geraldo:  NO   Adequate UO     I&O's Detail    14 Jun 2022 07:01  -  15 Juan 2022 06:24  --------------------------------------------------------  IN:  Total IN: 0 mL    OUT:    Stool (mL): 1 mL  Total OUT: 1 mL    Total NET: -1 mL               Discussed with:     Cultures:	        Radiology

## 2022-06-15 NOTE — PROGRESS NOTE ADULT - PROBLEM SELECTOR PLAN 3
chronic w/ severe persisting peripheral neuropathy 2/2 prior traumatic brain bleed on eliquis  - CT Head Non-Con ->Mild to moderate chronic microvascular changes. 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   - Fall, aspiration, safety precautions  - mostly bed bound w/ muscle contractures  - c/w gabapentin 300 mg TID, buproprion 150 mg qd, tizanidine 4 mg TID, and ridalin qd  PT chronic w/ severe persisting peripheral neuropathy 2/2 prior traumatic brain bleed on eliquis  - CT Head Non-Con ->Mild to moderate chronic microvascular changes. 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   - Fall, aspiration, safety precautions  - mostly bed bound w/ muscle contractures  - c/w gabapentin 300 mg TID, buproprion 150 mg qd, tizanidine 4 mg TID, and ritalin qd  PT

## 2022-06-16 LAB
ALBUMIN SERPL ELPH-MCNC: 2.7 G/DL — LOW (ref 3.3–5)
ALBUMIN SERPL ELPH-MCNC: 3 G/DL — LOW (ref 3.3–5)
ALBUMIN SERPL ELPH-MCNC: 3 G/DL — LOW (ref 3.3–5)
ALP SERPL-CCNC: 80 U/L — SIGNIFICANT CHANGE UP (ref 40–120)
ALP SERPL-CCNC: 81 U/L — SIGNIFICANT CHANGE UP (ref 40–120)
ALP SERPL-CCNC: 81 U/L — SIGNIFICANT CHANGE UP (ref 40–120)
ALT FLD-CCNC: 15 U/L — SIGNIFICANT CHANGE UP (ref 12–78)
ALT FLD-CCNC: 16 U/L — SIGNIFICANT CHANGE UP (ref 12–78)
ALT FLD-CCNC: 17 U/L — SIGNIFICANT CHANGE UP (ref 12–78)
ANION GAP SERPL CALC-SCNC: 7 MMOL/L — SIGNIFICANT CHANGE UP (ref 5–17)
AST SERPL-CCNC: 13 U/L — LOW (ref 15–37)
AST SERPL-CCNC: 14 U/L — LOW (ref 15–37)
AST SERPL-CCNC: 15 U/L — SIGNIFICANT CHANGE UP (ref 15–37)
BASOPHILS # BLD AUTO: 0.04 K/UL — SIGNIFICANT CHANGE UP (ref 0–0.2)
BASOPHILS NFR BLD AUTO: 0.5 % — SIGNIFICANT CHANGE UP (ref 0–2)
BILIRUB DIRECT SERPL-MCNC: 0.1 MG/DL — SIGNIFICANT CHANGE UP (ref 0–0.3)
BILIRUB DIRECT SERPL-MCNC: <0.1 MG/DL — SIGNIFICANT CHANGE UP (ref 0–0.3)
BILIRUB INDIRECT FLD-MCNC: 0.3 MG/DL — SIGNIFICANT CHANGE UP (ref 0.2–1)
BILIRUB INDIRECT FLD-MCNC: >0.1 MG/DL — LOW (ref 0.2–1)
BILIRUB SERPL-MCNC: 0.2 MG/DL — SIGNIFICANT CHANGE UP (ref 0.2–1.2)
BILIRUB SERPL-MCNC: 0.3 MG/DL — SIGNIFICANT CHANGE UP (ref 0.2–1.2)
BILIRUB SERPL-MCNC: 0.4 MG/DL — SIGNIFICANT CHANGE UP (ref 0.2–1.2)
BUN SERPL-MCNC: 20 MG/DL — SIGNIFICANT CHANGE UP (ref 7–23)
CALCIUM SERPL-MCNC: 8.8 MG/DL — SIGNIFICANT CHANGE UP (ref 8.5–10.1)
CHLORIDE SERPL-SCNC: 107 MMOL/L — SIGNIFICANT CHANGE UP (ref 96–108)
CO2 SERPL-SCNC: 26 MMOL/L — SIGNIFICANT CHANGE UP (ref 22–31)
CREAT SERPL-MCNC: 0.92 MG/DL — SIGNIFICANT CHANGE UP (ref 0.5–1.3)
CREAT SERPL-MCNC: 0.99 MG/DL — SIGNIFICANT CHANGE UP (ref 0.5–1.3)
EGFR: 82 ML/MIN/1.73M2 — SIGNIFICANT CHANGE UP
EGFR: 89 ML/MIN/1.73M2 — SIGNIFICANT CHANGE UP
EOSINOPHIL # BLD AUTO: 0.27 K/UL — SIGNIFICANT CHANGE UP (ref 0–0.5)
EOSINOPHIL NFR BLD AUTO: 3.2 % — SIGNIFICANT CHANGE UP (ref 0–6)
GLUCOSE SERPL-MCNC: 103 MG/DL — HIGH (ref 70–99)
HCT VFR BLD CALC: 40.1 % — SIGNIFICANT CHANGE UP (ref 39–50)
HGB BLD-MCNC: 13.1 G/DL — SIGNIFICANT CHANGE UP (ref 13–17)
IMM GRANULOCYTES NFR BLD AUTO: 0.8 % — SIGNIFICANT CHANGE UP (ref 0–1.5)
INR BLD: 1.36 RATIO — HIGH (ref 0.88–1.16)
INR BLD: 1.36 RATIO — HIGH (ref 0.88–1.16)
LYMPHOCYTES # BLD AUTO: 0.9 K/UL — LOW (ref 1–3.3)
LYMPHOCYTES # BLD AUTO: 10.6 % — LOW (ref 13–44)
MCHC RBC-ENTMCNC: 27 PG — SIGNIFICANT CHANGE UP (ref 27–34)
MCHC RBC-ENTMCNC: 32.7 GM/DL — SIGNIFICANT CHANGE UP (ref 32–36)
MCV RBC AUTO: 82.5 FL — SIGNIFICANT CHANGE UP (ref 80–100)
MONOCYTES # BLD AUTO: 0.91 K/UL — HIGH (ref 0–0.9)
MONOCYTES NFR BLD AUTO: 10.7 % — SIGNIFICANT CHANGE UP (ref 2–14)
NEUTROPHILS # BLD AUTO: 6.28 K/UL — SIGNIFICANT CHANGE UP (ref 1.8–7.4)
NEUTROPHILS NFR BLD AUTO: 74.2 % — SIGNIFICANT CHANGE UP (ref 43–77)
NRBC # BLD: 0 /100 WBCS — SIGNIFICANT CHANGE UP (ref 0–0)
PLATELET # BLD AUTO: 158 K/UL — SIGNIFICANT CHANGE UP (ref 150–400)
POTASSIUM SERPL-MCNC: 3.5 MMOL/L — SIGNIFICANT CHANGE UP (ref 3.5–5.3)
POTASSIUM SERPL-SCNC: 3.5 MMOL/L — SIGNIFICANT CHANGE UP (ref 3.5–5.3)
PROT SERPL-MCNC: 5.4 G/DL — LOW (ref 6–8.3)
PROT SERPL-MCNC: 5.7 G/DL — LOW (ref 6–8.3)
PROT SERPL-MCNC: 5.9 G/DL — LOW (ref 6–8.3)
PROTHROM AB SERPL-ACNC: 16 SEC — HIGH (ref 10.5–13.4)
PROTHROM AB SERPL-ACNC: 16 SEC — HIGH (ref 10.5–13.4)
RBC # BLD: 4.86 M/UL — SIGNIFICANT CHANGE UP (ref 4.2–5.8)
RBC # FLD: 15 % — HIGH (ref 10.3–14.5)
SODIUM SERPL-SCNC: 140 MMOL/L — SIGNIFICANT CHANGE UP (ref 135–145)
WBC # BLD: 8.47 K/UL — SIGNIFICANT CHANGE UP (ref 3.8–10.5)
WBC # FLD AUTO: 8.47 K/UL — SIGNIFICANT CHANGE UP (ref 3.8–10.5)

## 2022-06-16 PROCEDURE — 99232 SBSQ HOSP IP/OBS MODERATE 35: CPT

## 2022-06-16 RX ORDER — REMDESIVIR 5 MG/ML
100 INJECTION INTRAVENOUS ONCE
Refills: 0 | Status: COMPLETED | OUTPATIENT
Start: 2022-06-16 | End: 2022-06-16

## 2022-06-16 RX ORDER — POTASSIUM CHLORIDE 20 MEQ
40 PACKET (EA) ORAL ONCE
Refills: 0 | Status: COMPLETED | OUTPATIENT
Start: 2022-06-16 | End: 2022-06-16

## 2022-06-16 RX ADMIN — ATORVASTATIN CALCIUM 40 MILLIGRAM(S): 80 TABLET, FILM COATED ORAL at 22:07

## 2022-06-16 RX ADMIN — Medication 50 MILLIGRAM(S): at 05:59

## 2022-06-16 RX ADMIN — TAMSULOSIN HYDROCHLORIDE 0.4 MILLIGRAM(S): 0.4 CAPSULE ORAL at 22:06

## 2022-06-16 RX ADMIN — Medication 81 MILLIGRAM(S): at 12:03

## 2022-06-16 RX ADMIN — LATANOPROST 1 DROP(S): 0.05 SOLUTION/ DROPS OPHTHALMIC; TOPICAL at 22:08

## 2022-06-16 RX ADMIN — GABAPENTIN 300 MILLIGRAM(S): 400 CAPSULE ORAL at 14:58

## 2022-06-16 RX ADMIN — GABAPENTIN 300 MILLIGRAM(S): 400 CAPSULE ORAL at 22:06

## 2022-06-16 RX ADMIN — APIXABAN 2.5 MILLIGRAM(S): 2.5 TABLET, FILM COATED ORAL at 05:59

## 2022-06-16 RX ADMIN — TIZANIDINE 4 MILLIGRAM(S): 4 TABLET ORAL at 14:58

## 2022-06-16 RX ADMIN — Medication 10 MILLIGRAM(S): at 22:08

## 2022-06-16 RX ADMIN — Medication 10 MILLIGRAM(S): at 14:54

## 2022-06-16 RX ADMIN — Medication 1000 UNIT(S): at 14:03

## 2022-06-16 RX ADMIN — TIZANIDINE 4 MILLIGRAM(S): 4 TABLET ORAL at 05:59

## 2022-06-16 RX ADMIN — SENNA PLUS 2 TABLET(S): 8.6 TABLET ORAL at 22:07

## 2022-06-16 RX ADMIN — POLYETHYLENE GLYCOL 3350 17 GRAM(S): 17 POWDER, FOR SOLUTION ORAL at 05:58

## 2022-06-16 RX ADMIN — Medication 20 MILLIGRAM(S): at 14:07

## 2022-06-16 RX ADMIN — REMDESIVIR 500 MILLIGRAM(S): 5 INJECTION INTRAVENOUS at 15:00

## 2022-06-16 RX ADMIN — POLYETHYLENE GLYCOL 3350 17 GRAM(S): 17 POWDER, FOR SOLUTION ORAL at 18:01

## 2022-06-16 RX ADMIN — Medication 30 MILLIGRAM(S): at 23:02

## 2022-06-16 RX ADMIN — Medication 30 MILLIGRAM(S): at 03:18

## 2022-06-16 RX ADMIN — TIZANIDINE 4 MILLIGRAM(S): 4 TABLET ORAL at 22:07

## 2022-06-16 RX ADMIN — Medication 40 MILLIEQUIVALENT(S): at 18:01

## 2022-06-16 RX ADMIN — Medication 30 MILLIGRAM(S): at 18:01

## 2022-06-16 RX ADMIN — APIXABAN 2.5 MILLIGRAM(S): 2.5 TABLET, FILM COATED ORAL at 18:01

## 2022-06-16 RX ADMIN — GABAPENTIN 300 MILLIGRAM(S): 400 CAPSULE ORAL at 05:59

## 2022-06-16 RX ADMIN — Medication 10 MILLIGRAM(S): at 05:59

## 2022-06-16 RX ADMIN — BUPROPION HYDROCHLORIDE 150 MILLIGRAM(S): 150 TABLET, EXTENDED RELEASE ORAL at 12:02

## 2022-06-16 NOTE — PROGRESS NOTE ADULT - SUBJECTIVE AND OBJECTIVE BOX
Date/Time Patient Seen:  		  Referring MD:   Data Reviewed	       Patient is a 72y old  Male who presents with a chief complaint of COVID (15 Juan 2022 12:53)      Subjective/HPI     PAST MEDICAL & SURGICAL HISTORY:  TBI (traumatic brain injury)    HTN (hypertension)    Atrial fibrillation    DM (diabetes mellitus)    Peripheral neuropathy    Major depression    HLD (hyperlipidemia)    CAD (coronary artery disease)    BPH (benign prostatic hyperplasia)    No significant past surgical history          Medication list         MEDICATIONS  (STANDING):  apixaban 2.5 milliGRAM(s) Oral two times a day  aspirin  chewable 81 milliGRAM(s) Oral daily  atorvastatin 40 milliGRAM(s) Oral at bedtime  bethanechol 10 milliGRAM(s) Oral three times a day  buPROPion XL (24-Hour) . 150 milliGRAM(s) Oral daily  cholecalciferol 1000 Unit(s) Oral daily  diltiazem    Tablet 30 milliGRAM(s) Oral every 6 hours  gabapentin 300 milliGRAM(s) Oral three times a day  hydrALAZINE 50 milliGRAM(s) Oral two times a day  latanoprost 0.005% Ophthalmic Solution 1 Drop(s) Both EYES at bedtime  methylphenidate 20 milliGRAM(s) Oral daily  polyethylene glycol 3350 17 Gram(s) Oral two times a day  remdesivir  IVPB   IV Intermittent   remdesivir  IVPB 100 milliGRAM(s) IV Intermittent every 24 hours  senna 2 Tablet(s) Oral at bedtime  tamsulosin 0.4 milliGRAM(s) Oral at bedtime  tiZANidine 4 milliGRAM(s) Oral three times a day    MEDICATIONS  (PRN):  acetaminophen     Tablet .. 650 milliGRAM(s) Oral every 6 hours PRN Temp greater or equal to 38C (100.4F), Mild Pain (1 - 3)  aluminum hydroxide/magnesium hydroxide/simethicone Suspension 30 milliLiter(s) Oral every 4 hours PRN Dyspepsia  guaiFENesin Oral Liquid (Sugar-Free) 200 milliGRAM(s) Oral every 6 hours PRN Cough  melatonin 3 milliGRAM(s) Oral at bedtime PRN Insomnia  ondansetron Injectable 4 milliGRAM(s) IV Push every 8 hours PRN Nausea and/or Vomiting         Vitals log        ICU Vital Signs Last 24 Hrs  T(C): 36.5 (16 Jun 2022 05:50), Max: 37.2 (15 Juan 2022 12:28)  T(F): 97.7 (16 Jun 2022 05:50), Max: 98.9 (15 Juan 2022 12:28)  HR: 71 (16 Jun 2022 05:50) (71 - 89)  BP: 115/72 (16 Jun 2022 05:50) (98/52 - 130/69)  BP(mean): --  ABP: --  ABP(mean): --  RR: 17 (16 Jun 2022 05:50) (16 - 18)  SpO2: 95% (16 Jun 2022 05:50) (94% - 97%)           Input and Output:  I&O's Detail    14 Jun 2022 07:01  -  15 Juan 2022 07:00  --------------------------------------------------------  IN:  Total IN: 0 mL    OUT:    Stool (mL): 1 mL  Total OUT: 1 mL    Total NET: -1 mL      15 Juan 2022 07:01  -  16 Jun 2022 06:56  --------------------------------------------------------  IN:  Total IN: 0 mL    OUT:    Stool (mL): 1 mL    Voided (mL): 700 mL  Total OUT: 701 mL    Total NET: -701 mL          Lab Data                        12.9   8.66  )-----------( 156      ( 15 Juan 2022 08:04 )             40.0     06-15    141  |  108  |  19  ----------------------------<  129<H>  4.1   |  25  |  1.10    Ca    8.5      15 Juan 2022 08:04  Phos  2.9     06-15  Mg     2.2     06-15    TPro  6.0  /  Alb  3.0<L>  /  TBili  0.4  /  DBili  0.2  /  AST  12<L>  /  ALT  18  /  AlkPhos  87  06-15            Review of Systems	      Objective     Physical Examination    heart s1s2  lung dec BS  abd soft      Pertinent Lab findings & Imaging      Geraldo:  NO   Adequate UO     I&O's Detail    14 Jun 2022 07:01  -  15 Jun 2022 07:00  --------------------------------------------------------  IN:  Total IN: 0 mL    OUT:    Stool (mL): 1 mL  Total OUT: 1 mL    Total NET: -1 mL      15 Juan 2022 07:01  -  16 Jun 2022 06:56  --------------------------------------------------------  IN:  Total IN: 0 mL    OUT:    Stool (mL): 1 mL    Voided (mL): 700 mL  Total OUT: 701 mL    Total NET: -701 mL               Discussed with:     Cultures:	        Radiology

## 2022-06-16 NOTE — PROGRESS NOTE ADULT - PROBLEM SELECTOR PLAN 7
BM yesterday, however previous BM was 1 week ago. Abdomen distended but soft on exam.  - c/w Miralax BID + senna.

## 2022-06-16 NOTE — PROGRESS NOTE ADULT - PROBLEM SELECTOR PLAN 4
chronic, stable soft  - hold home eplerenone as per Cardiology  - hold lopressor for soft BP, rates well controlled  - hold losartan and hydralazine given soft bp  - routine hemodynamic monitoring  - Cardio Dr MARTÍNEZ wise

## 2022-06-16 NOTE — PROGRESS NOTE ADULT - ATTENDING COMMENTS
Pt is a 72 y.o. M with a PMH of HTN, AFIB, BPH, hx of TBI, HLD, CAD, peripheral neuropathy, found to be COVID+ who is being admitted for management of AFib with RVR., COVID 19 + infection.  Pt seen, examined, case & care plan d/w pt, residents at detail.  D/W Wife at  detail, on Phone today.  Cardiology-Dr kemp  follow up   ID DR Manning -Remdesivir 3 dose. Rx as per ID  Pulmonary-DR Bui -supportive care   Aspiration precautions   COVID -Isolation  AM labs   PO diet.   Total care time is 40 minutes .-A  arrangement for d/c plan.

## 2022-06-16 NOTE — PROGRESS NOTE ADULT - PROBLEM SELECTOR PLAN 2
COVID (+) - saturating well on RA  - c/w tylenols 650 mg po q6h prn fever  - c/w supportive care  - c/w albuterol q6h prn  - c/w home Eliquis mg BID  - c/w remdesivir   - No steroids at this time  pulm following - Hao - will appreciate recs  ID following - Nigel - will need to find out how many days

## 2022-06-16 NOTE — PROGRESS NOTE ADULT - SUBJECTIVE AND OBJECTIVE BOX
[facility] -- Janak Edwards, Alexey العراقي, Agus Sepulveda Savella  Office # [phone]      Follow Up:    Afib   Subjective/Observations:   Seen at bedside, much more alert today answering questions in no acute distress   remains on room air   + cough, denies chest pain dizziness palpitations shortness of breath     REVIEW OF SYSTEMS: All other review of systems is negative unless indicated above    PAST MEDICAL & SURGICAL HISTORY:  TBI (traumatic brain injury)      HTN (hypertension)      Atrial fibrillation      Peripheral neuropathy      Major depression      HLD (hyperlipidemia)      CAD (coronary artery disease)      BPH (benign prostatic hyperplasia)      No significant past surgical history          MEDICATIONS  (STANDING):  apixaban 2.5 milliGRAM(s) Oral two times a day  aspirin  chewable 81 milliGRAM(s) Oral daily  atorvastatin 40 milliGRAM(s) Oral at bedtime  bethanechol 10 milliGRAM(s) Oral three times a day  buPROPion XL (24-Hour) . 150 milliGRAM(s) Oral daily  cholecalciferol 1000 Unit(s) Oral daily  diltiazem    Tablet 30 milliGRAM(s) Oral every 6 hours  gabapentin 300 milliGRAM(s) Oral three times a day  latanoprost 0.005% Ophthalmic Solution 1 Drop(s) Both EYES at bedtime  methylphenidate 20 milliGRAM(s) Oral daily  polyethylene glycol 3350 17 Gram(s) Oral two times a day  remdesivir  IVPB   IV Intermittent   remdesivir  IVPB 100 milliGRAM(s) IV Intermittent every 24 hours  senna 2 Tablet(s) Oral at bedtime  tamsulosin 0.4 milliGRAM(s) Oral at bedtime  tiZANidine 4 milliGRAM(s) Oral three times a day    MEDICATIONS  (PRN):  acetaminophen     Tablet .. 650 milliGRAM(s) Oral every 6 hours PRN Temp greater or equal to 38C (100.4F), Mild Pain (1 - 3)  aluminum hydroxide/magnesium hydroxide/simethicone Suspension 30 milliLiter(s) Oral every 4 hours PRN Dyspepsia  guaiFENesin Oral Liquid (Sugar-Free) 200 milliGRAM(s) Oral every 6 hours PRN Cough  melatonin 3 milliGRAM(s) Oral at bedtime PRN Insomnia  ondansetron Injectable 4 milliGRAM(s) IV Push every 8 hours PRN Nausea and/or Vomiting      Allergies    No Known Allergies    Intolerances        Vital Signs Last 24 Hrs  T(C): 36.9 (2022 09:03), Max: 37.2 (15 Juan 2022 12:28)  T(F): 98.4 (2022 09:03), Max: 98.9 (15 Juan 2022 12:28)  HR: 70 (:03) (70 - 89)  BP: 100/50 (2022 09:03) (100/50 - 130/69)  BP(mean): --  RR: 18 (:03) (16 - 18)  SpO2: 96% (:03) (94% - 96%)    I&O's Summary    15 Juan 2022 07:01  -  2022 07:00  --------------------------------------------------------  IN: 0 mL / OUT: 701 mL / NET: -701 mL          PHYSICAL EXAM:  TELE: Af  Constitutional: NAD, awake and alert, well-developed  HEENT: Moist Mucous Membranes, Anicteric  Pulmonary: Non-labored, breath sounds are clear bilaterally, No wheezing, crackles or rhonchi  Cardiovascular: IRRegular, S1 and S2 nl, No murmurs, rubs, gallops or clicks  Gastrointestinal: Bowel Sounds present, soft, nontender.   Lymph: No lymphadenopathy. No peripheral edema.  Skin: No visible rashes or ulcers.  Psych:  Mood & affect appropriate    LABS: All Labs Reviewed:                        13.1   8.47  )-----------( 158      ( 2022 07:49 )             40.1                         12.9   8.66  )-----------( 156      ( 15 Juan 2022 08:04 )             40.0                         14.7   11.44 )-----------( 159      ( 2022 11:03 )             45.7     2022 07:49    140    |  107    |  20     ----------------------------<  103    3.5     |  26     |  0.99   15 Juan 2022 08:04    141    |  108    |  19     ----------------------------<  129    4.1     |  25     |  1.10   2022 16:29    x      |  x      |  x      ----------------------------<  x      x       |  x      |  0.99     Ca    8.8        2022 07:49  Ca    8.5        15 Juan 2022 08:04  Ca    8.8        2022 11:03  Phos  2.9       15 Juan 2022 08:04  Mg     2.2       15 Juan 2022 08:04    TPro  5.7    /  Alb  3.0    /  TBili  0.3    /  DBili  0.1    /  AST  13     /  ALT  15     /  AlkPhos  81     2022 07:49  TPro  6.0    /  Alb  3.0    /  TBili  0.4    /  DBili  0.2    /  AST  12     /  ALT  18     /  AlkPhos  87     15 Juan 2022 08:04  TPro  6.0    /  Alb  3.1    /  TBili  0.5    /  DBili  0.2    /  AST  9      /  ALT  18     /  AlkPhos  96     2022 16:29    PT/INR - ( 2022 07:49 )   PT: 16.0 sec;   INR: 1.36 ratio         PTT - ( 2022 11:03 )  PTT:37.0 sec         EC Lead ECG:   Ventricular Rate 123 BPM    QRS Duration 78 ms    Q-T Interval 298 ms    QTC Calculation(Bazett) 426 ms    R Axis 200 degrees    T Axis 5 degrees    Diagnosis Line Atrial fibrillation with rapid ventricular response  Right superior axis deviation  Right ventricular hypertrophy  Septal infarct (cited on or before 22-MAY-2022)  Abnormal ECG  When compared with ECG of 22-MAY-2022 14:51,  No significant change was found  Confirmed by TOMMY SEPULVEDA ([id number]) on 2022 12:39:22 PM (22 @ 10:15)        Radiology:

## 2022-06-16 NOTE — PROGRESS NOTE ADULT - PROBLEM SELECTOR PLAN 3
chronic w/ severe persisting peripheral neuropathy 2/2 prior traumatic brain bleed on eliquis  - CT Head Non-Con ->Mild to moderate chronic microvascular changes. 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   - Fall, aspiration, safety precautions  - mostly bed bound w/ muscle contractures  - c/w gabapentin 300 mg TID, buproprion 150 mg qd, tizanidine 4 mg TID, and ritalin qd  PT -> home

## 2022-06-16 NOTE — PROGRESS NOTE ADULT - PROBLEM SELECTOR PLAN 1
likely 2/2 fever 2/2 COVID - rate-controlled - improved  on CCB,BB - on AC  - hold home diltiazem 300mg/24hrs.  - c/w Cardizem 30 Q6 w/ hold parameters  -  hold lopressor for soft BP, rates well controlled  -  hold losartan and hydralazine given soft bp  - continue continuous tele monitoring  - No meaningful evidence of volume overload.  - Previous TTE shows EF 60-65% on 7/2020  - f/up TTE  - Continue ASA 81mg and Rosuvastatin 10mg   - Eplerenone on hold  cardio following -DR MARTÍNEZ wise - likely 2/2 fever 2/2 COVID - rate-controlled - improved  on CCB,BB - on AC  - hold home diltiazem 300mg/24hrs.  - c/w Cardizem 30 Q6 w/ hold parameters  -  hold lopressor for soft BP, rates well controlled  -  hold losartan and hydralazine given soft bp  - continue continuous tele monitoring  - No meaningful evidence of volume overload.  - Previous TTE shows EF 60-65% on 7/2020  - f/up TTE  - Continue ASA 81mg and Rosuvastatin 10mg   - Eplerenone on hold  cardio following -DR Blackburn d/w about BP  Meds

## 2022-06-16 NOTE — PROGRESS NOTE ADULT - SUBJECTIVE AND OBJECTIVE BOX
Patient is a 72y old  Male who presents with a chief complaint of COVID (16 Jun 2022 14:28)      HPI:  Pt is a 72 y.o. M with a PMH of HTN, AFIB, BPH, hx of TBI, HLD, CAD, peripheral neuropathy, found to be COVID+ who presented to the ED with CC of fatigue. Pt is not the best historian after having TBI 2/2 fall on AC, so history taken via phone from wife. Pt reports that this morning he woke up "feeling lousy" with a fever and a cough. Pt tested COVID + today. Pt restarted eliquis 2 days ago, and follows up with Dr. Goldsmith for cardiology while outpatient. Pt endorses fever, lethargy diminished sensation on hands and feet 2/2 neuropathy, loss of balance, cough, constipation (last BM yesterday, but before that 1 week prior). Denies CP, palpitations, n/v/d, abdominal pain, leg pain. as per wife pt had some friends who visited pt & pt also has 2 HHA .    In the ED:  T 101.2 F Oral  /90 RR 18 SpO2 98% on RA  WBC 11.44 INR 1.3 Glucose 154 Procal 0.11 COVID +  CT Head Non-Con: Mild to moderate chronic microvascular changes. 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   CXR: Lung Clear.  ECG: AFib RVR  2L NS Bolus x1, 1g IV Tylenol, 1g Rocephin (14 Jun 2022 13:13)      INTERVAL HPI:  6/15/22: Patient seen and examined at bedside. No acute events overnight. Patient on BB CCB w/ rate-controlled HR ~80's. On remdesivir for COVID. saturating well on RA. On eliquis for Afib/DVT. Endorses feeling better without significant dyspnea or cough, but has some fatigue/malaise. ON Remdisivir IVPB daily   6/16/22: Patient seen and examined at bedside. No acute events overnight. Rate controlled. HDS. On remdesivir saturating well on RA. Endourses cough but no dyspnea. "I Feel well."    OVERNIGHT EVENTS: none    Home Medications:  aspirin 81 mg oral tablet: 1 tab(s) orally once a day (14 Jun 2022 13:52)  bethanechol 10 mg oral tablet: 1 tab(s) orally 3 times a day at (8am, 3pm, 10pm) (14 Jun 2022 13:52)  buPROPion 75 mg oral tablet: 1 tab(s) orally 2 times a day (10am, 10pm) (14 Jun 2022 13:52)  Eliquis 2.5 mg oral tablet: 1 tab(s) orally 2 times a day (10am, 10pm)t (14 Jun 2022 13:52)  eplerenone 25 mg oral tablet: 1 tab(s) orally once a day (14 Jun 2022 13:52)  gabapentin 300 mg oral capsule: 1 cap(s) orally 3 times a day (8am, 3pm, 10pm (14 Jun 2022 13:52)  hydrALAZINE 50 mg oral tablet: 1 tab(s) orally 2 times a day (14 Jun 2022 13:52)  latanoprost 0.005% ophthalmic solution: 1 drop(s) to each affected eye once a day (in the evening) (14 Jun 2022 13:52)  losartan 100 mg oral tablet: 1 tab(s) orally once a day (14 Jun 2022 13:52)  methylphenidate 10 mg oral tablet: 2 tab(s) orally once a day (in the morning) (14 Jun 2022 13:52)  rosuvastatin 10 mg oral tablet: 1 tab(s) orally once a day (14 Jun 2022 13:52)  tamsulosin 0.4 mg oral capsule: 1 cap(s) orally once a day (in the evening) (14 Jun 2022 13:52)  Tiadylt  mg/24 hours oral capsule, extended release: 1 cap(s) orally once a day (14 Jun 2022 13:52)  tiZANidine 4 mg oral tablet: 1 tab(s) orally 3 times a day (14 Jun 2022 13:52)  Vitamin D3 25 mcg (1000 intl units) oral tablet: 1 tab(s) orally once a day (14 Jun 2022 13:52)      MEDICATIONS  (STANDING):  apixaban 2.5 milliGRAM(s) Oral two times a day  aspirin  chewable 81 milliGRAM(s) Oral daily  atorvastatin 40 milliGRAM(s) Oral at bedtime  bethanechol 10 milliGRAM(s) Oral three times a day  buPROPion XL (24-Hour) . 150 milliGRAM(s) Oral daily  cholecalciferol 1000 Unit(s) Oral daily  diltiazem    Tablet 30 milliGRAM(s) Oral every 6 hours  gabapentin 300 milliGRAM(s) Oral three times a day  latanoprost 0.005% Ophthalmic Solution 1 Drop(s) Both EYES at bedtime  methylphenidate 20 milliGRAM(s) Oral daily  polyethylene glycol 3350 17 Gram(s) Oral two times a day  senna 2 Tablet(s) Oral at bedtime  tamsulosin 0.4 milliGRAM(s) Oral at bedtime  tiZANidine 4 milliGRAM(s) Oral three times a day    MEDICATIONS  (PRN):  acetaminophen     Tablet .. 650 milliGRAM(s) Oral every 6 hours PRN Temp greater or equal to 38C (100.4F), Mild Pain (1 - 3)  aluminum hydroxide/magnesium hydroxide/simethicone Suspension 30 milliLiter(s) Oral every 4 hours PRN Dyspepsia  guaiFENesin Oral Liquid (Sugar-Free) 200 milliGRAM(s) Oral every 6 hours PRN Cough  melatonin 3 milliGRAM(s) Oral at bedtime PRN Insomnia  ondansetron Injectable 4 milliGRAM(s) IV Push every 8 hours PRN Nausea and/or Vomiting      No Known Allergies      Social History:  Vaccinated for COVID 3/3, last shot in february with moderna. Ambulates without assistance- pt is bedbound and wheelchair bound. Pt is reliant on wife for ADLs. Denies alcohol, tobacco, or recreational drug use. (14 Jun 2022 13:13)      REVIEW OF SYSTEMS:  CONSTITUTIONAL: No fever, No chills, No fatigue, No myalgia, No Body ache, ADMITS Weakness  EYES: No eye pain,  No visual disturbances, No discharge, No Redness  ENMT: No ear pain, No nose bleed, No vertigo; No sinus pain, No throat pain, No Congestion  NECK: No pain, No stiffness  RESPIRATORY: ADMITS cough, No wheezing, No hemoptysis, No shortness of breath  CARDIOVASCULAR: No chest pain, No palpitations  GASTROINTESTINAL: No abdominal pain, No epigastric pain. No nausea, No vomiting, No diarrhea, No constipation; [ x ] BM  GENITOURINARY: No dysuria, No frequency, No urgency, No hematuria, No incontinence  NEUROLOGICAL: No headaches, No dizziness, No numbness, No tingling, No tremors, No weakness  EXTREMITIES: No Swelling, No Pain, No Edema  SKIN: [ x ] No itching, burning, rashes, or lesions   MUSCULOSKELETAL: No joint pain, No joint swelling; No muscle pain, No back pain, No extremity pain  PSYCHIATRIC: No depression, No anxiety, No mood swings, No difficulty sleeping at night  PAIN SCALE: [ x ] None  [  ] Other-  ROS Unable to obtain due to: [  ] Dementia  [  ] Lethargy  [  ] Sedated  [  ] Non verbal  REST OF REVIEW OF SYSTEMS: [ x ] Normal     Vital Signs Last 24 Hrs  T(C): 36.7 (16 Jun 2022 11:23), Max: 36.9 (16 Jun 2022 09:03)  T(F): 98.1 (16 Jun 2022 11:23), Max: 98.4 (16 Jun 2022 09:03)  HR: 81 (16 Jun 2022 11:23) (70 - 82)  BP: 110/67 (16 Jun 2022 11:23) (100/50 - 130/69)  BP(mean): --  RR: 18 (16 Jun 2022 11:23) (17 - 18)  SpO2: 95% (16 Jun 2022 11:23) (95% - 96%)    CAPILLARY BLOOD GLUCOSE          I&O's Summary    15 Juan 2022 07:01  -  16 Jun 2022 07:00  --------------------------------------------------------  IN: 0 mL / OUT: 701 mL / NET: -701 mL    16 Jun 2022 07:01  -  16 Jun 2022 16:11  --------------------------------------------------------  IN: 0 mL / OUT: 400 mL / NET: -400 mL      PHYSICAL EXAM:  GENERAL:  [ x ] NAD, [  x] Well appearing, [  ] Agitated, [  ] Drowsy, [  ] Lethargy, [  ] Confused   HEAD:  [ x ] Normal, [  ] Other  EYES:  [ x ] EOMI, [  ] PERRLA, [x  ] Conjunctiva and sclera clear normal, [  ] Other, [  ] Pallor, [  ] Discharge  ENMT:  [ x ] Normal, [ x ] Moist mucous membranes, [ x ] Good dentition, [ x ] No thrush  NECK:  [ x ] Supple, [ x ] No JVD, [ x ] Normal thyroid, [  ] Lymphadenopathy, [  ] Other  CHEST/LUNG:  [ x ] Clear to auscultation bilaterally, [ x ] Breath Sounds equal B/L  [  ] Poor effort, [x  ] No rales, [  x] No rhonchi, [ x ] No wheezing  HEART:  [ x ] Regular rate, [  ] Tachycardia, [  ] Bradycardia, [ x ] Irregular, [x  ] No murmurs, No rubs, No gallops, [  ] PPM in place (Mfr:  )  ABDOMEN:  [ x ] Soft, [x  ] Nontender, [ x ] Nondistended, [ x ] No mass, [ x ] Bowel sounds present, [  ] Obese  NERVOUS SYSTEM:  [ x ] Alert & Oriented x3, [ x ] Nonfocal, [  ] Confusion, [  ] Encephalopathic, [  ] Sedated, [  ] Unable to assess, [  ] Dementia, [ x ] Other-contractures EXT & Hand B/L   EXTREMITIES:  [ x ] 2+ Peripheral Pulses, No clubbing, No cyanosis,  [  ] Edema B/L lower EXT, [  ] PVD stasis skin changes B/L lower EXT, [  ] Wound [ x ] spasticity of all 4 extremities with prominent contractures of extensors of hands b/l  LYMPH:  No lymphadenopathy noted  SKIN:  [  ] No rashes or lesions, [  ] Pressure ulcers, [  ] Ecchymosis, [  ] Skin tears, [  ] Other    DIET: Diet, Regular (06-14-22 @ 14:17)      LABS:                        13.1   8.47  )-----------( 158      ( 16 Jun 2022 07:49 )             40.1     16 Jun 2022 07:49    140    |  107    |  20     ----------------------------<  103    3.5     |  26     |  0.99     Ca    8.8        16 Jun 2022 07:49    TPro  5.7    /  Alb  3.0    /  TBili  0.3    /  DBili  0.1    /  AST  13     /  ALT  15     /  AlkPhos  81     16 Jun 2022 07:49    PT/INR - ( 16 Jun 2022 07:49 )   PT: 16.0 sec;   INR: 1.36 ratio             Culture Results:   No growth to date. (06-14 @ 16:30)  Culture Results:   No growth to date. (06-14 @ 16:30)    culture blood  -- .Blood Blood-Peripheral 06-14 @ 16:30    culture urine  --  06-14 @ 16:30          Culture - Blood (collected 14 Jun 2022 16:30)  Source: .Blood Blood-Peripheral  Preliminary Report (15 Juan 2022 17:01):    No growth to date.    Culture - Blood (collected 14 Jun 2022 16:30)  Source: .Blood Blood-Peripheral  Preliminary Report (15 Juan 2022 17:01):    No growth to date.       Anemia Panel:  Ferritin, Serum: 113 ng/mL (06-15-22 @ 10:55)      Thyroid Panel:                RADIOLOGY & ADDITIONAL TESTS:      HEALTH ISSUES - PROBLEM Dx:  Atrial fibrillation    2019 novel coronavirus disease (COVID-19)    TBI (traumatic brain injury)    HTN (hypertension)    HLD (hyperlipidemia)    CAD (coronary artery disease)    Constipation    BPH (benign prostatic hyperplasia)    Major depression    Need for prophylactic measure          Consultant(s) Notes Reviewed:  [ x ] YES     Care Discussed with [ x ] Consultants, [ x ] Patient, [ x ] Family- wife , [  ] HCP, [x  ] , [x  ] Social Service, [x  ] RN, [  ] Physical Therapy, [  ] Palliative Care Team  DVT PPX: [  ] Lovenox, [  ] SC Heparin, [  ] Coumadin, [  ] Xarelto, [ x ] Eliquis, [  ] Pradaxa, [  ] IV Heparin drip, [  ] SCD, [  ] Ambulation, [  ] Contraindicated 2/2 GI Bleed, [  ] Contraindicated 2/2  Bleed, [  ] Contraindicated 2/2 Brain Bleed  Advanced Directive: [ x ] None, [  ] DNR/DNI Patient is a 72y old  Male who presents with a chief complaint of COVID (16 Jun 2022 14:28)      HPI:  Pt is a 72 y.o. M with a PMH of HTN, AFIB, BPH, hx of TBI, HLD, CAD, peripheral neuropathy, found to be COVID+ who presented to the ED with CC of fatigue. Pt is not the best historian after having TBI 2/2 fall on AC, so history taken via phone from wife. Pt reports that this morning he woke up "feeling lousy" with a fever and a cough. Pt tested COVID + today. Pt restarted eliquis 2 days ago, and follows up with Dr. Goldsmith for cardiology while outpatient. Pt endorses fever, lethargy diminished sensation on hands and feet 2/2 neuropathy, loss of balance, cough, constipation (last BM yesterday, but before that 1 week prior). Denies CP, palpitations, n/v/d, abdominal pain, leg pain. as per wife pt had some friends who visited pt & pt also has 2 HHA .    In the ED:  T 101.2 F Oral  /90 RR 18 SpO2 98% on RA  WBC 11.44 INR 1.3 Glucose 154 Procal 0.11 COVID +  CT Head Non-Con: Mild to moderate chronic microvascular changes. 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   CXR: Lung Clear.  ECG: AFib RVR  2L NS Bolus x1, 1g IV Tylenol, 1g Rocephin (14 Jun 2022 13:13)      INTERVAL HPI:  6/15/22: Patient seen and examined at bedside. No acute events overnight. Patient on BB CCB w/ rate-controlled HR ~80's. On remdesivir for COVID. saturating well on RA. On eliquis for Afib/DVT. Endorses feeling better without significant dyspnea or cough, but has some fatigue/malaise. ON Remdisivir IVPB daily   6/16/22: Patient seen and examined at bedside. No acute events overnight. Rate controlled. HDS. On remdesivir saturating well on RA. Endourses cough but no dyspnea. "I Feel well." S/P IV Remdesivir daily x 3 dose.    OVERNIGHT EVENTS: none    Home Medications:  aspirin 81 mg oral tablet: 1 tab(s) orally once a day (14 Jun 2022 13:52)  bethanechol 10 mg oral tablet: 1 tab(s) orally 3 times a day at (8am, 3pm, 10pm) (14 Jun 2022 13:52)  buPROPion 75 mg oral tablet: 1 tab(s) orally 2 times a day (10am, 10pm) (14 Jun 2022 13:52)  Eliquis 2.5 mg oral tablet: 1 tab(s) orally 2 times a day (10am, 10pm)t (14 Jun 2022 13:52)  eplerenone 25 mg oral tablet: 1 tab(s) orally once a day (14 Jun 2022 13:52)  gabapentin 300 mg oral capsule: 1 cap(s) orally 3 times a day (8am, 3pm, 10pm (14 Jun 2022 13:52)  hydrALAZINE 50 mg oral tablet: 1 tab(s) orally 2 times a day (14 Jun 2022 13:52)  latanoprost 0.005% ophthalmic solution: 1 drop(s) to each affected eye once a day (in the evening) (14 Jun 2022 13:52)  losartan 100 mg oral tablet: 1 tab(s) orally once a day (14 Jun 2022 13:52)  methylphenidate 10 mg oral tablet: 2 tab(s) orally once a day (in the morning) (14 Jun 2022 13:52)  rosuvastatin 10 mg oral tablet: 1 tab(s) orally once a day (14 Jun 2022 13:52)  tamsulosin 0.4 mg oral capsule: 1 cap(s) orally once a day (in the evening) (14 Jun 2022 13:52)  Tiadylt  mg/24 hours oral capsule, extended release: 1 cap(s) orally once a day (14 Jun 2022 13:52)  tiZANidine 4 mg oral tablet: 1 tab(s) orally 3 times a day (14 Jun 2022 13:52)  Vitamin D3 25 mcg (1000 intl units) oral tablet: 1 tab(s) orally once a day (14 Jun 2022 13:52)      MEDICATIONS  (STANDING):  apixaban 2.5 milliGRAM(s) Oral two times a day  aspirin  chewable 81 milliGRAM(s) Oral daily  atorvastatin 40 milliGRAM(s) Oral at bedtime  bethanechol 10 milliGRAM(s) Oral three times a day  buPROPion XL (24-Hour) . 150 milliGRAM(s) Oral daily  cholecalciferol 1000 Unit(s) Oral daily  diltiazem    Tablet 30 milliGRAM(s) Oral every 6 hours  gabapentin 300 milliGRAM(s) Oral three times a day  latanoprost 0.005% Ophthalmic Solution 1 Drop(s) Both EYES at bedtime  methylphenidate 20 milliGRAM(s) Oral daily  polyethylene glycol 3350 17 Gram(s) Oral two times a day  senna 2 Tablet(s) Oral at bedtime  tamsulosin 0.4 milliGRAM(s) Oral at bedtime  tiZANidine 4 milliGRAM(s) Oral three times a day    MEDICATIONS  (PRN):  acetaminophen     Tablet .. 650 milliGRAM(s) Oral every 6 hours PRN Temp greater or equal to 38C (100.4F), Mild Pain (1 - 3)  aluminum hydroxide/magnesium hydroxide/simethicone Suspension 30 milliLiter(s) Oral every 4 hours PRN Dyspepsia  guaiFENesin Oral Liquid (Sugar-Free) 200 milliGRAM(s) Oral every 6 hours PRN Cough  melatonin 3 milliGRAM(s) Oral at bedtime PRN Insomnia  ondansetron Injectable 4 milliGRAM(s) IV Push every 8 hours PRN Nausea and/or Vomiting      No Known Allergies      Social History:  Vaccinated for COVID 3/3, last shot in february with moderna. Ambulates without assistance- pt is bedbound and wheelchair bound. Pt is reliant on wife for ADLs. Denies alcohol, tobacco, or recreational drug use. (14 Jun 2022 13:13)      REVIEW OF SYSTEMS:  CONSTITUTIONAL: No fever, No chills, No fatigue, No myalgia, No Body ache, ADMITS Weakness  EYES: No eye pain,  No visual disturbances, No discharge, No Redness  ENMT: No ear pain, No nose bleed, No vertigo; No sinus pain, No throat pain, No Congestion  NECK: No pain, No stiffness  RESPIRATORY: ADMITS cough, No wheezing, No hemoptysis, No shortness of breath  CARDIOVASCULAR: No chest pain, No palpitations  GASTROINTESTINAL: No abdominal pain, No epigastric pain. No nausea, No vomiting, No diarrhea, No constipation; [ x ] BM  GENITOURINARY: No dysuria, No frequency, No urgency, No hematuria, No incontinence  NEUROLOGICAL: No headaches, No dizziness, No numbness, No tingling, No tremors, No weakness  EXTREMITIES: No Swelling, No Pain, No Edema  SKIN: [ x ] No itching, burning, rashes, or lesions   MUSCULOSKELETAL: No joint pain, No joint swelling; No muscle pain, No back pain, No extremity pain  PSYCHIATRIC: No depression, No anxiety, No mood swings, No difficulty sleeping at night  PAIN SCALE: [ x ] None  [  ] Other-  ROS Unable to obtain due to: [  ] Dementia  [  ] Lethargy  [  ] Sedated  [  ] Non verbal  REST OF REVIEW OF SYSTEMS: [ x ] Normal     Vital Signs Last 24 Hrs  T(C): 36.7 (16 Jun 2022 11:23), Max: 36.9 (16 Jun 2022 09:03)  T(F): 98.1 (16 Jun 2022 11:23), Max: 98.4 (16 Jun 2022 09:03)  HR: 81 (16 Jun 2022 11:23) (70 - 82)  BP: 110/67 (16 Jun 2022 11:23) (100/50 - 130/69)  BP(mean): --  RR: 18 (16 Jun 2022 11:23) (17 - 18)  SpO2: 95% (16 Jun 2022 11:23) (95% - 96%)    CAPILLARY BLOOD GLUCOSE          I&O's Summary    15 Juan 2022 07:01  -  16 Jun 2022 07:00  --------------------------------------------------------  IN: 0 mL / OUT: 701 mL / NET: -701 mL    16 Jun 2022 07:01  -  16 Jun 2022 16:11  --------------------------------------------------------  IN: 0 mL / OUT: 400 mL / NET: -400 mL      PHYSICAL EXAM:  GENERAL:  [ x ] NAD, [  x] Well appearing, [  ] Agitated, [  ] Drowsy, [  ] Lethargy, [  ] Confused   HEAD:  [ x ] Normal, [  ] Other  EYES:  [ x ] EOMI, [  ] PERRLA, [x  ] Conjunctiva and sclera clear normal, [  ] Other, [  ] Pallor, [  ] Discharge  ENMT:  [ x ] Normal, [ x ] Moist mucous membranes, [ x ] Good dentition, [ x ] No thrush  NECK:  [ x ] Supple, [ x ] No JVD, [ x ] Normal thyroid, [  ] Lymphadenopathy, [  ] Other  CHEST/LUNG:  [ x ] Clear to auscultation bilaterally, [ x ] Breath Sounds equal B/L  [  ] Poor effort, [x  ] No rales, [  x] No rhonchi, [ x ] No wheezing  HEART:  [ x ] Regular rate, [  ] Tachycardia, [  ] Bradycardia, [ x ] Irregular, [x  ] No murmurs, No rubs, No gallops, [  ] PPM in place (Mfr:  )  ABDOMEN:  [ x ] Soft, [x  ] Nontender, [ x ] Nondistended, [ x ] No mass, [ x ] Bowel sounds present, [  ] Obese  NERVOUS SYSTEM:  [ x ] Alert & Oriented x3, [ x ] Nonfocal, [  ] Confusion, [  ] Encephalopathic, [  ] Sedated, [  ] Unable to assess, [  ] Dementia, [ x ] Other-contractures EXT & Hand B/L   EXTREMITIES:  [ x ] 2+ Peripheral Pulses, No clubbing, No cyanosis,  [  ] Edema B/L lower EXT, [  ] PVD stasis skin changes B/L lower EXT, [  ] Wound [ x ] spasticity of all 4 extremities with prominent contractures of extensors of hands b/l  LYMPH:  No lymphadenopathy noted  SKIN:  [  ] No rashes or lesions, [  ] Pressure ulcers, [  ] Ecchymosis, [  ] Skin tears, [  ] Other    DIET: Diet, Regular (06-14-22 @ 14:17)      LABS:                        13.1   8.47  )-----------( 158      ( 16 Jun 2022 07:49 )             40.1     16 Jun 2022 07:49    140    |  107    |  20     ----------------------------<  103    3.5     |  26     |  0.99     Ca    8.8        16 Jun 2022 07:49    TPro  5.7    /  Alb  3.0    /  TBili  0.3    /  DBili  0.1    /  AST  13     /  ALT  15     /  AlkPhos  81     16 Jun 2022 07:49    PT/INR - ( 16 Jun 2022 07:49 )   PT: 16.0 sec;   INR: 1.36 ratio             Culture Results:   No growth to date. (06-14 @ 16:30)  Culture Results:   No growth to date. (06-14 @ 16:30)    culture blood  -- .Blood Blood-Peripheral 06-14 @ 16:30    culture urine  --  06-14 @ 16:30          Culture - Blood (collected 14 Jun 2022 16:30)  Source: .Blood Blood-Peripheral  Preliminary Report (15 Juan 2022 17:01):    No growth to date.    Culture - Blood (collected 14 Jun 2022 16:30)  Source: .Blood Blood-Peripheral  Preliminary Report (15 Juan 2022 17:01):    No growth to date.       Anemia Panel:  Ferritin, Serum: 113 ng/mL (06-15-22 @ 10:55)      Thyroid Panel:      RADIOLOGY & ADDITIONAL TESTS:`< from: TTE Echo Complete w/o Contrast w/ Doppler (06.15.22 @ 17:19) >  OBSERVATIONS:  Mitral Valve: Trace to mild MR.  Aortic Valve/Aorta: normal trileaflet aortic valve.  Tricuspid Valve: normal with trace TR.  Pulmonic Valve: Trace PI  Left Atrium: Enlarged  Right Atrium: Not well-visualized  Left Ventricle: Mild left ventricular hypertrophy with normal systolic   function, estimated LVEF of 60%.  Right Ventricle: Grossly normal size and systolic function.  Pericardium: no significant pericardial effusion.  Pulmonary/RV Pressure: estimated PA systolic pressure of 18 mmHg          IMPRESSION:  Mild left ventricular hypertrophy with normal systolic function,   estimated LVEF of 60%.  Grossly normal RV size and systolic function.  Left atrial enlargement  Normal trileaflet aortic valve, without AI.  Trace to mild MR  Trace TR.  No significant pericardial effusion.    < end of copied text >        HEALTH ISSUES - PROBLEM Dx:  Atrial fibrillation    2019 novel coronavirus disease (COVID-19)    TBI (traumatic brain injury)    HTN (hypertension)    HLD (hyperlipidemia)    CAD (coronary artery disease)    Constipation    BPH (benign prostatic hyperplasia)    Major depression    Need for prophylactic measure          Consultant(s) Notes Reviewed:  [ x ] YES     Care Discussed with [ x ] Consultants, [ x ] Patient, [ x ] Family- wife , [  ] HCP, [x  ] , [x  ] Social Service, [x  ] RN, [  ] Physical Therapy, [  ] Palliative Care Team  DVT PPX: [  ] Lovenox, [  ] SC Heparin, [  ] Coumadin, [  ] Xarelto, [ x ] Eliquis, [  ] Pradaxa, [  ] IV Heparin drip, [  ] SCD, [  ] Ambulation, [  ] Contraindicated 2/2 GI Bleed, [  ] Contraindicated 2/2  Bleed, [  ] Contraindicated 2/2 Brain Bleed  Advanced Directive: [ x ] None, [  ] DNR/DNI

## 2022-06-16 NOTE — GOALS OF CARE CONVERSATION - ADVANCED CARE PLANNING - CONVERSATION DETAILS
Writer spoke  with Love wife /HCP. Reviewed patient's medical and social history as well as events leading to patient's hospitalization. Writer discussed patient's current diagnosis (Covid, TBI, BPH, CAD, HLD, major depression, peripheral neuropathy, A/F, HTN), medical condition and management, prognosis, and life expectancy. Inquired about patient's wishes regarding extent of medical care to be provided including escalation of medical care into the ICU and use of vasopressor support. In addition, the writer inquired about thoughts regarding cardiopulmonary resuscitation, artificial nutrition and hydration including use of feeding tubes and IVF, antibiotics, and further investigative studies such as blood draws and radiology. Love  showed good  insight into medical condition. All questions answered. Writer recommended when she can to discuss this with him. She wants resuscitation for him at this time. Psychosocial support provided.

## 2022-06-16 NOTE — PROGRESS NOTE ADULT - SUBJECTIVE AND OBJECTIVE BOX
[facility] DIVISION of INFECTIOUS DISEASE  Ovidio Manning MD PhD, Jojo Boggs MD, Hafsa Bhardwaj MD, Danica Goldsmith MD, Samy Terry MD  and providing coverage with Meet Perea MD  Providing Infectious Disease Consultations at [facility], [facility], [facility]      Office# [phone] to schedule follow up appointments  Answering Service for urgent calls or New Consults [phone]  Cell# to text for urgent issues Ovidio Manning [phone]     Infectious diseases progress note:    MELBA PARMAR is a 72y y. o. Male patient    COVID Patient    Allergies    No Known Allergies    Intolerances        ANTIBIOTICS/RELEVANT:  antimicrobials  remdesivir  IVPB   IV Intermittent   remdesivir  IVPB 100 milliGRAM(s) IV Intermittent every 24 hours    immunologic:    OTHER:  acetaminophen     Tablet .. 650 milliGRAM(s) Oral every 6 hours PRN  aluminum hydroxide/magnesium hydroxide/simethicone Suspension 30 milliLiter(s) Oral every 4 hours PRN  apixaban 2.5 milliGRAM(s) Oral two times a day  aspirin  chewable 81 milliGRAM(s) Oral daily  atorvastatin 40 milliGRAM(s) Oral at bedtime  bethanechol 10 milliGRAM(s) Oral three times a day  buPROPion XL (24-Hour) . 150 milliGRAM(s) Oral daily  cholecalciferol 1000 Unit(s) Oral daily  diltiazem    Tablet 30 milliGRAM(s) Oral every 6 hours  gabapentin 300 milliGRAM(s) Oral three times a day  guaiFENesin Oral Liquid (Sugar-Free) 200 milliGRAM(s) Oral every 6 hours PRN  latanoprost 0.005% Ophthalmic Solution 1 Drop(s) Both EYES at bedtime  melatonin 3 milliGRAM(s) Oral at bedtime PRN  methylphenidate 20 milliGRAM(s) Oral daily  ondansetron Injectable 4 milliGRAM(s) IV Push every 8 hours PRN  polyethylene glycol 3350 17 Gram(s) Oral two times a day  senna 2 Tablet(s) Oral at bedtime  tamsulosin 0.4 milliGRAM(s) Oral at bedtime  tiZANidine 4 milliGRAM(s) Oral three times a day      Objective:  Vital Signs Last 24 Hrs  T(C): 36.7 (16 Jun 2022 11:23), Max: 36.9 (16 Jun 2022 09:03)  T(F): 98.1 (16 Jun 2022 11:23), Max: 98.4 (16 Jun 2022 09:03)  HR: 81 (16 Jun 2022 11:23) (70 - 85)  BP: 110/67 (16 Jun 2022 11:23) (100/50 - 130/69)  BP(mean): --  RR: 18 (16 Jun 2022 11:23) (16 - 18)  SpO2: 95% (16 Jun 2022 11:23) (95% - 96%)    T(C): 36.7 (06-16-22 @ 11:23), Max: 38.1 (06-14-22 @ 17:25)  T(C): 36.7 (06-16-22 @ 11:23), Max: 38.4 (06-14-22 @ 09:53)  T(C): 36.7 (06-16-22 @ 11:23), Max: 38.4 (06-14-22 @ 09:53)    PHYSICAL EXAM:  HEENT: NC atraumatic  Neck: supple  Respiratory: no accessory muscle use, breathing comfortably  Cardiovascular: distant  Gastrointestinal: normal appearing, nondistended  Extremities: no clubbing, no cyanosis,        LABS:                          13.1   8.47  )-----------( 158      ( 16 Jun 2022 07:49 )             40.1       WBC  8.47 06-16 @ 07:49  8.66 06-15 @ 08:04  11.44 06-14 @ 11:03      06-16    140  |  107  |  20  ----------------------------<  103<H>  3.5   |  26  |  0.99    Ca    8.8      16 Jun 2022 07:49  Phos  2.9     06-15  Mg     2.2     06-15    TPro  5.7<L>  /  Alb  3.0<L>  /  TBili  0.3  /  DBili  0.1  /  AST  13<L>  /  ALT  15  /  AlkPhos  81  06-16      Creatinine, Serum: 0.99 mg/dL (06-16-22 @ 07:49)  Creatinine, Serum: 1.10 mg/dL (06-15-22 @ 08:04)  Creatinine, Serum: 0.99 mg/dL (06-14-22 @ 16:29)  Creatinine, Serum: 1.20 mg/dL (06-14-22 @ 11:03)      PT/INR - ( 16 Jun 2022 07:49 )   PT: 16.0 sec;   INR: 1.36 ratio                   COVID RISK SCORE  Auto Neutrophil #: 6.28 K/uL (06-16-22 @ 07:49)  Auto Lymphocyte #: 0.90 K/uL (06-16-22 @ 07:49)  Auto Neutrophil #: 9.72 K/uL (06-14-22 @ 11:03)  Auto Lymphocyte #: 0.80 K/uL (06-14-22 @ 11:03)    Lactate, Blood: 1.3 mmol/L (06-14-22 @ 11:03)    Auto Eosinophil #: 0.27 K/uL (06-16-22 @ 07:49)  Auto Eosinophil #: 0.11 K/uL (06-14-22 @ 11:03)      Sedimentation Rate, Erythrocyte: 2 mm/hr (06-14-22 @ 11:03)    Procalcitonin, Serum: 0.11 ng/mL (06-14-22 @ 11:03)            Ferritin, Serum: 113 ng/mL (06-15-22 @ 10:55)        INR: 1.36 ratio (06-16-22 @ 07:49)  INR: 1.33 ratio (06-15-22 @ 08:04)  INR: 1.26 ratio (06-14-22 @ 16:29)  Activated Partial Thromboplastin Time: 37.0 sec (06-14-22 @ 11:03)  INR: 1.30 ratio (06-14-22 @ 11:03)    D-Dimer Assay, Quantitative: 154 ng/mL DDU (06-15-22 @ 08:04)  D-Dimer Assay, Quantitative: <150 ng/mL DDU (06-14-22 @ 16:29)        MICROBIOLOGY:              SARS-CoV-2: Detected (14 Jun 2022 11:19)  COVID-19 PCR: Detected (14 Jun 2022 11:03)      RADIOLOGY & ADDITIONAL STUDIES:

## 2022-06-17 ENCOUNTER — APPOINTMENT (OUTPATIENT)
Dept: CARDIOLOGY | Facility: CLINIC | Age: 72
End: 2022-06-17

## 2022-06-17 ENCOUNTER — TRANSCRIPTION ENCOUNTER (OUTPATIENT)
Age: 72
End: 2022-06-17

## 2022-06-17 LAB
ALBUMIN SERPL ELPH-MCNC: 2.8 G/DL — LOW (ref 3.3–5)
ALBUMIN SERPL ELPH-MCNC: 2.9 G/DL — LOW (ref 3.3–5)
ALP SERPL-CCNC: 77 U/L — SIGNIFICANT CHANGE UP (ref 40–120)
ALP SERPL-CCNC: 83 U/L — SIGNIFICANT CHANGE UP (ref 40–120)
ALT FLD-CCNC: 16 U/L — SIGNIFICANT CHANGE UP (ref 12–78)
ALT FLD-CCNC: 18 U/L — SIGNIFICANT CHANGE UP (ref 12–78)
ANION GAP SERPL CALC-SCNC: 7 MMOL/L — SIGNIFICANT CHANGE UP (ref 5–17)
APPEARANCE UR: CLEAR — SIGNIFICANT CHANGE UP
AST SERPL-CCNC: 11 U/L — LOW (ref 15–37)
AST SERPL-CCNC: 12 U/L — LOW (ref 15–37)
BASOPHILS # BLD AUTO: 0.03 K/UL — SIGNIFICANT CHANGE UP (ref 0–0.2)
BASOPHILS NFR BLD AUTO: 0.4 % — SIGNIFICANT CHANGE UP (ref 0–2)
BILIRUB DIRECT SERPL-MCNC: 0.1 MG/DL — SIGNIFICANT CHANGE UP (ref 0–0.3)
BILIRUB INDIRECT FLD-MCNC: 0.2 MG/DL — SIGNIFICANT CHANGE UP (ref 0.2–1)
BILIRUB SERPL-MCNC: 0.3 MG/DL — SIGNIFICANT CHANGE UP (ref 0.2–1.2)
BILIRUB SERPL-MCNC: 0.3 MG/DL — SIGNIFICANT CHANGE UP (ref 0.2–1.2)
BILIRUB UR-MCNC: NEGATIVE — SIGNIFICANT CHANGE UP
BUN SERPL-MCNC: 23 MG/DL — SIGNIFICANT CHANGE UP (ref 7–23)
CALCIUM SERPL-MCNC: 8.4 MG/DL — LOW (ref 8.5–10.1)
CHLORIDE SERPL-SCNC: 110 MMOL/L — HIGH (ref 96–108)
CO2 SERPL-SCNC: 26 MMOL/L — SIGNIFICANT CHANGE UP (ref 22–31)
COLOR SPEC: YELLOW — SIGNIFICANT CHANGE UP
CREAT SERPL-MCNC: 0.87 MG/DL — SIGNIFICANT CHANGE UP (ref 0.5–1.3)
DIFF PNL FLD: NEGATIVE — SIGNIFICANT CHANGE UP
EGFR: 93 ML/MIN/1.73M2 — SIGNIFICANT CHANGE UP
EOSINOPHIL # BLD AUTO: 0.48 K/UL — SIGNIFICANT CHANGE UP (ref 0–0.5)
EOSINOPHIL NFR BLD AUTO: 6.9 % — HIGH (ref 0–6)
GLUCOSE SERPL-MCNC: 103 MG/DL — HIGH (ref 70–99)
GLUCOSE UR QL: NEGATIVE — SIGNIFICANT CHANGE UP
HCT VFR BLD CALC: 38.4 % — LOW (ref 39–50)
HGB BLD-MCNC: 12.7 G/DL — LOW (ref 13–17)
IMM GRANULOCYTES NFR BLD AUTO: 1 % — SIGNIFICANT CHANGE UP (ref 0–1.5)
INR BLD: 1.32 RATIO — HIGH (ref 0.88–1.16)
KETONES UR-MCNC: NEGATIVE — SIGNIFICANT CHANGE UP
LEUKOCYTE ESTERASE UR-ACNC: ABNORMAL
LYMPHOCYTES # BLD AUTO: 1.05 K/UL — SIGNIFICANT CHANGE UP (ref 1–3.3)
LYMPHOCYTES # BLD AUTO: 15.1 % — SIGNIFICANT CHANGE UP (ref 13–44)
MCHC RBC-ENTMCNC: 27.1 PG — SIGNIFICANT CHANGE UP (ref 27–34)
MCHC RBC-ENTMCNC: 33.1 GM/DL — SIGNIFICANT CHANGE UP (ref 32–36)
MCV RBC AUTO: 81.9 FL — SIGNIFICANT CHANGE UP (ref 80–100)
MONOCYTES # BLD AUTO: 0.65 K/UL — SIGNIFICANT CHANGE UP (ref 0–0.9)
MONOCYTES NFR BLD AUTO: 9.4 % — SIGNIFICANT CHANGE UP (ref 2–14)
NEUTROPHILS # BLD AUTO: 4.66 K/UL — SIGNIFICANT CHANGE UP (ref 1.8–7.4)
NEUTROPHILS NFR BLD AUTO: 67.2 % — SIGNIFICANT CHANGE UP (ref 43–77)
NITRITE UR-MCNC: NEGATIVE — SIGNIFICANT CHANGE UP
NRBC # BLD: 0 /100 WBCS — SIGNIFICANT CHANGE UP (ref 0–0)
PH UR: 5 — SIGNIFICANT CHANGE UP (ref 5–8)
PLATELET # BLD AUTO: 171 K/UL — SIGNIFICANT CHANGE UP (ref 150–400)
POTASSIUM SERPL-MCNC: 3.8 MMOL/L — SIGNIFICANT CHANGE UP (ref 3.5–5.3)
POTASSIUM SERPL-SCNC: 3.8 MMOL/L — SIGNIFICANT CHANGE UP (ref 3.5–5.3)
PROT SERPL-MCNC: 5.5 G/DL — LOW (ref 6–8.3)
PROT SERPL-MCNC: 5.5 G/DL — LOW (ref 6–8.3)
PROT UR-MCNC: 15
PROTHROM AB SERPL-ACNC: 15.5 SEC — HIGH (ref 10.5–13.4)
RBC # BLD: 4.69 M/UL — SIGNIFICANT CHANGE UP (ref 4.2–5.8)
RBC # FLD: 14.8 % — HIGH (ref 10.3–14.5)
SODIUM SERPL-SCNC: 143 MMOL/L — SIGNIFICANT CHANGE UP (ref 135–145)
SP GR SPEC: 1.02 — SIGNIFICANT CHANGE UP (ref 1.01–1.02)
UROBILINOGEN FLD QL: NEGATIVE — SIGNIFICANT CHANGE UP
WBC # BLD: 6.94 K/UL — SIGNIFICANT CHANGE UP (ref 3.8–10.5)
WBC # FLD AUTO: 6.94 K/UL — SIGNIFICANT CHANGE UP (ref 3.8–10.5)

## 2022-06-17 PROCEDURE — 99221 1ST HOSP IP/OBS SF/LOW 40: CPT

## 2022-06-17 PROCEDURE — 99232 SBSQ HOSP IP/OBS MODERATE 35: CPT

## 2022-06-17 RX ORDER — HYDRALAZINE HCL 50 MG
1 TABLET ORAL
Qty: 0 | Refills: 0 | DISCHARGE

## 2022-06-17 RX ORDER — DILTIAZEM HCL 120 MG
1 CAPSULE, EXT RELEASE 24 HR ORAL
Qty: 30 | Refills: 0
Start: 2022-06-17 | End: 2022-07-16

## 2022-06-17 RX ORDER — LOSARTAN POTASSIUM 100 MG/1
25 TABLET, FILM COATED ORAL DAILY
Refills: 0 | Status: DISCONTINUED | OUTPATIENT
Start: 2022-06-17 | End: 2022-06-23

## 2022-06-17 RX ORDER — DILTIAZEM HCL 120 MG
1 CAPSULE, EXT RELEASE 24 HR ORAL
Qty: 0 | Refills: 0 | DISCHARGE

## 2022-06-17 RX ORDER — SOD,AMMONIUM,POTASSIUM LACTATE
1 CREAM (GRAM) TOPICAL
Refills: 0 | Status: DISCONTINUED | OUTPATIENT
Start: 2022-06-17 | End: 2022-06-23

## 2022-06-17 RX ORDER — DILTIAZEM HCL 120 MG
120 CAPSULE, EXT RELEASE 24 HR ORAL DAILY
Refills: 0 | Status: DISCONTINUED | OUTPATIENT
Start: 2022-06-17 | End: 2022-06-23

## 2022-06-17 RX ORDER — EPLERENONE 50 MG/1
1 TABLET, FILM COATED ORAL
Qty: 0 | Refills: 0 | DISCHARGE

## 2022-06-17 RX ORDER — LOSARTAN POTASSIUM 100 MG/1
1 TABLET, FILM COATED ORAL
Qty: 0 | Refills: 0 | DISCHARGE

## 2022-06-17 RX ADMIN — TIZANIDINE 4 MILLIGRAM(S): 4 TABLET ORAL at 22:34

## 2022-06-17 RX ADMIN — SENNA PLUS 2 TABLET(S): 8.6 TABLET ORAL at 22:33

## 2022-06-17 RX ADMIN — APIXABAN 2.5 MILLIGRAM(S): 2.5 TABLET, FILM COATED ORAL at 18:56

## 2022-06-17 RX ADMIN — POLYETHYLENE GLYCOL 3350 17 GRAM(S): 17 POWDER, FOR SOLUTION ORAL at 06:25

## 2022-06-17 RX ADMIN — Medication 1 APPLICATION(S): at 18:56

## 2022-06-17 RX ADMIN — GABAPENTIN 300 MILLIGRAM(S): 400 CAPSULE ORAL at 06:25

## 2022-06-17 RX ADMIN — Medication 10 MILLIGRAM(S): at 14:49

## 2022-06-17 RX ADMIN — BUPROPION HYDROCHLORIDE 150 MILLIGRAM(S): 150 TABLET, EXTENDED RELEASE ORAL at 11:28

## 2022-06-17 RX ADMIN — TIZANIDINE 4 MILLIGRAM(S): 4 TABLET ORAL at 06:25

## 2022-06-17 RX ADMIN — Medication 81 MILLIGRAM(S): at 11:28

## 2022-06-17 RX ADMIN — POLYETHYLENE GLYCOL 3350 17 GRAM(S): 17 POWDER, FOR SOLUTION ORAL at 18:56

## 2022-06-17 RX ADMIN — Medication 120 MILLIGRAM(S): at 11:29

## 2022-06-17 RX ADMIN — APIXABAN 2.5 MILLIGRAM(S): 2.5 TABLET, FILM COATED ORAL at 06:25

## 2022-06-17 RX ADMIN — ATORVASTATIN CALCIUM 40 MILLIGRAM(S): 80 TABLET, FILM COATED ORAL at 22:34

## 2022-06-17 RX ADMIN — Medication 30 MILLIGRAM(S): at 06:25

## 2022-06-17 RX ADMIN — LOSARTAN POTASSIUM 25 MILLIGRAM(S): 100 TABLET, FILM COATED ORAL at 14:49

## 2022-06-17 RX ADMIN — Medication 20 MILLIGRAM(S): at 11:27

## 2022-06-17 RX ADMIN — Medication 1000 UNIT(S): at 11:28

## 2022-06-17 RX ADMIN — Medication 10 MILLIGRAM(S): at 06:25

## 2022-06-17 RX ADMIN — LATANOPROST 1 DROP(S): 0.05 SOLUTION/ DROPS OPHTHALMIC; TOPICAL at 22:35

## 2022-06-17 RX ADMIN — GABAPENTIN 300 MILLIGRAM(S): 400 CAPSULE ORAL at 14:49

## 2022-06-17 RX ADMIN — Medication 10 MILLIGRAM(S): at 22:34

## 2022-06-17 RX ADMIN — TIZANIDINE 4 MILLIGRAM(S): 4 TABLET ORAL at 14:49

## 2022-06-17 RX ADMIN — GABAPENTIN 300 MILLIGRAM(S): 400 CAPSULE ORAL at 22:33

## 2022-06-17 RX ADMIN — TAMSULOSIN HYDROCHLORIDE 0.4 MILLIGRAM(S): 0.4 CAPSULE ORAL at 22:35

## 2022-06-17 NOTE — PROGRESS NOTE ADULT - ATTENDING COMMENTS
Pt is a 72 y.o. M with a PMH of HTN, AFIB, BPH, hx of TBI, HLD, CAD, peripheral neuropathy, found to be COVID+ who is being admitted for management of AFib with RVR., COVID 19 + infection.  Pt seen, examined, case & care plan d/w pt, residents at detail.  D/W Wife at  detail, on Phone today.  Cardiology-Dr العراقي  follow up   ID DR Manning -Remdesivir 3 dose. completed as per ID  Pulmonary-DR Bui -supportive care   Aspiration precautions   COVID -Isolation  AM labs   PO diet.   Total care time is 40 minutes .  -Pt is stable for D/C Home   -D/W case management , Pt's HHA  not available. wife likely trying to private higher HHA for D/C .

## 2022-06-17 NOTE — PROGRESS NOTE ADULT - SUBJECTIVE AND OBJECTIVE BOX
Patient is a 72y old  Male who presents with a chief complaint of Afib w/ RVR + COVID (2022 16:10)      HPI:  Pt is a 72 y.o. M with a PMH of HTN, AFIB, BPH, hx of TBI, HLD, CAD, peripheral neuropathy, found to be COVID+ who presented to the ED with CC of fatigue. Pt is not the best historian after having TBI 2/2 fall on AC, so history taken via phone from wife. Pt reports that this morning he woke up "feeling lousy" with a fever and a cough. Pt tested COVID + today. Pt restarted eliquis 2 days ago, and follows up with Dr. Goldsmith for cardiology while outpatient. Pt endorses fever, lethargy diminished sensation on hands and feet 2/2 neuropathy, loss of balance, cough, constipation (last BM yesterday, but before that 1 week prior). Denies CP, palpitations, n/v/d, abdominal pain, leg pain. as per wife pt had some friends who visited pt & pt also has 2 HHA .    In the ED:  T 101.2 F Oral  /90 RR 18 SpO2 98% on RA  WBC 11.44 INR 1.3 Glucose 154 Procal 0.11 COVID +  CT Head Non-Con: Mild to moderate chronic microvascular changes. 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   CXR: Lung Clear.  ECG: AFib RVR  2L NS Bolus x1, 1g IV Tylenol, 1g Rocephin (2022 13:13)      INTERVAL HPI:  6/15/22: Patient seen and examined at bedside. No acute events overnight. Patient on BB CCB w/ rate-controlled HR ~80's. On remdesivir for COVID. saturating well on RA. On eliquis for Afib/DVT. Endorses feeling better without significant dyspnea or cough, but has some fatigue/malaise. ON Remdisivir IVPB daily   22: Patient seen and examined at bedside. No acute events overnight. Rate controlled. HDS. On remdesivir saturating well on RA. Endourses cough but no dyspnea. "I Feel well." S/P IV Remdesivir daily x 3 dose.    OVERNIGHT EVENTS: none    Home Medications:  aspirin 81 mg oral tablet: 1 tab(s) orally once a day (2022 13:52)  bethanechol 10 mg oral tablet: 1 tab(s) orally 3 times a day at (8am, 3pm, 10pm) (2022 13:52)  buPROPion 75 mg oral tablet: 1 tab(s) orally 2 times a day (10am, 10pm) (2022 13:52)  Eliquis 2.5 mg oral tablet: 1 tab(s) orally 2 times a day (10am, 10pm)t (2022 13:52)  eplerenone 25 mg oral tablet: 1 tab(s) orally once a day (2022 13:52)  gabapentin 300 mg oral capsule: 1 cap(s) orally 3 times a day (8am, 3pm, 10pm (2022 13:52)  hydrALAZINE 50 mg oral tablet: 1 tab(s) orally 2 times a day (2022 13:52)  latanoprost 0.005% ophthalmic solution: 1 drop(s) to each affected eye once a day (in the evening) (:52)  losartan 100 mg oral tablet: 1 tab(s) orally once a day (2022 13:52)  methylphenidate 10 mg oral tablet: 2 tab(s) orally once a day (in the morning) (:52)  rosuvastatin 10 mg oral tablet: 1 tab(s) orally once a day (2022 13:52)  tamsulosin 0.4 mg oral capsule: 1 cap(s) orally once a day (in the evening) (2022 13:52)  Tiadylt  mg/24 hours oral capsule, extended release: 1 cap(s) orally once a day (2022 13:52)  tiZANidine 4 mg oral tablet: 1 tab(s) orally 3 times a day (2022 13:52)  Vitamin D3 25 mcg (1000 intl units) oral tablet: 1 tab(s) orally once a day (2022 13:52)      MEDICATIONS  (STANDING):  apixaban 2.5 milliGRAM(s) Oral two times a day  aspirin  chewable 81 milliGRAM(s) Oral daily  atorvastatin 40 milliGRAM(s) Oral at bedtime  bethanechol 10 milliGRAM(s) Oral three times a day  buPROPion XL (24-Hour) . 150 milliGRAM(s) Oral daily  cholecalciferol 1000 Unit(s) Oral daily  diltiazem    Tablet 30 milliGRAM(s) Oral every 6 hours  gabapentin 300 milliGRAM(s) Oral three times a day  latanoprost 0.005% Ophthalmic Solution 1 Drop(s) Both EYES at bedtime  methylphenidate 20 milliGRAM(s) Oral daily  polyethylene glycol 3350 17 Gram(s) Oral two times a day  senna 2 Tablet(s) Oral at bedtime  tamsulosin 0.4 milliGRAM(s) Oral at bedtime  tiZANidine 4 milliGRAM(s) Oral three times a day    MEDICATIONS  (PRN):  acetaminophen     Tablet .. 650 milliGRAM(s) Oral every 6 hours PRN Temp greater or equal to 38C (100.4F), Mild Pain (1 - 3)  aluminum hydroxide/magnesium hydroxide/simethicone Suspension 30 milliLiter(s) Oral every 4 hours PRN Dyspepsia  guaiFENesin Oral Liquid (Sugar-Free) 200 milliGRAM(s) Oral every 6 hours PRN Cough  melatonin 3 milliGRAM(s) Oral at bedtime PRN Insomnia  ondansetron Injectable 4 milliGRAM(s) IV Push every 8 hours PRN Nausea and/or Vomiting      No Known Allergies      Social History:  Vaccinated for COVID 3/3, last shot in february with moderna. Ambulates without assistance- pt is bedbound and wheelchair bound. Pt is reliant on wife for ADLs. Denies alcohol, tobacco, or recreational drug use. (2022 13:13)      REVIEW OF SYSTEMS:  CONSTITUTIONAL: No fever, No chills, No fatigue, No myalgia, No Body ache, ADMITS Weakness  EYES: No eye pain,  No visual disturbances, No discharge, No Redness  ENMT: No ear pain, No nose bleed, No vertigo; No sinus pain, No throat pain, No Congestion  NECK: No pain, No stiffness  RESPIRATORY: ADMITS cough, No wheezing, No hemoptysis, No shortness of breath  CARDIOVASCULAR: No chest pain, No palpitations  GASTROINTESTINAL: No abdominal pain, No epigastric pain. No nausea, No vomiting, No diarrhea, No constipation; [ x ] BM  GENITOURINARY: No dysuria, No frequency, No urgency, No hematuria, No incontinence  NEUROLOGICAL: No headaches, No dizziness, No numbness, No tingling, No tremors, No weakness  EXTREMITIES: No Swelling, No Pain, No Edema  SKIN: [ x ] No itching, burning, rashes, or lesions   MUSCULOSKELETAL: No joint pain, No joint swelling; No muscle pain, No back pain, No extremity pain  PSYCHIATRIC: No depression, No anxiety, No mood swings, No difficulty sleeping at night  PAIN SCALE: [ x ] None  [  ] Other-  ROS Unable to obtain due to: [  ] Dementia  [  ] Lethargy  [  ] Sedated  [  ] Non verbal  REST OF REVIEW OF SYSTEMS: [ x ] Normal     Vital Signs Last 24 Hrs  T(C): 36.9 (2022 06:31), Max: 36.9 (2022 09:03)  T(F): 98.4 (2022 06:31), Max: 98.4 (2022 09:03)  HR: 92 (:31) (70 - 98)  BP: 141/99 (2022 06:31) (100/50 - 141/99)  BP(mean): --  RR: 18 (:31) (18 - 18)  SpO2: 93% (:31) (93% - 96%)    CAPILLARY BLOOD GLUCOSE          I&O's Summary    2022 07:01  -  2022 07:00  --------------------------------------------------------  IN: 100 mL / OUT: 1200 mL / NET: -1100 mL      PHYSICAL EXAM:  GENERAL:  [ x ] NAD, [  x] Well appearing, [  ] Agitated, [  ] Drowsy, [  ] Lethargy, [  ] Confused   HEAD:  [ x ] Normal, [  ] Other  EYES:  [ x ] EOMI, [  ] PERRLA, [x  ] Conjunctiva and sclera clear normal, [  ] Other, [  ] Pallor, [  ] Discharge  ENMT:  [ x ] Normal, [ x ] Moist mucous membranes, [ x ] Good dentition, [ x ] No thrush  NECK:  [ x ] Supple, [ x ] No JVD, [ x ] Normal thyroid, [  ] Lymphadenopathy, [  ] Other  CHEST/LUNG:  [ x ] Clear to auscultation bilaterally, [ x ] Breath Sounds equal B/L  [  ] Poor effort, [x  ] No rales, [  x] No rhonchi, [ x ] No wheezing  HEART:  [ x ] Regular rate, [  ] Tachycardia, [  ] Bradycardia, [ x ] Irregular, [x  ] No murmurs, No rubs, No gallops, [  ] PPM in place (Mfr:  )  ABDOMEN:  [ x ] Soft, [x  ] Nontender, [ x ] Nondistended, [ x ] No mass, [ x ] Bowel sounds present, [  ] Obese  NERVOUS SYSTEM:  [ x ] Alert & Oriented x3, [ x ] Nonfocal, [  ] Confusion, [  ] Encephalopathic, [  ] Sedated, [  ] Unable to assess, [  ] Dementia, [ x ] Other-contractures EXT & Hand B/L   EXTREMITIES:  [ x ] 2+ Peripheral Pulses, No clubbing, No cyanosis,  [  ] Edema B/L lower EXT, [  ] PVD stasis skin changes B/L lower EXT, [  ] Wound [ x ] spasticity of all 4 extremities with prominent contractures of extensors of hands b/l  LYMPH:  No lymphadenopathy noted  SKIN:  [  ] No rashes or lesions, [  ] Pressure ulcers, [  ] Ecchymosis, [  ] Skin tears, [  ] Other    DIET: Diet, Regular (22 @ 14:17)      LABS:                        12.7   6.94  )-----------( 171      ( 2022 07:55 )             38.4     2022 07:55    143    |  110    |  23     ----------------------------<  103    3.8     |  26     |  0.87     Ca    8.4        2022 07:55    TPro  5.5    /  Alb  2.8    /  TBili  0.3    /  DBili  x      /  AST  12     /  ALT  18     /  AlkPhos  77     2022 07:55    PT/INR - ( 2022 07:55 )   PT: 15.5 sec;   INR: 1.32 ratio           Urinalysis Basic - ( 2022 08:11 )    Color: Yellow / Appearance: Clear / S.020 / pH: x  Gluc: x / Ketone: Negative  / Bili: Negative / Urobili: Negative   Blood: x / Protein: 15 / Nitrite: Negative   Leuk Esterase: Trace / RBC: x / WBC x   Sq Epi: x / Non Sq Epi: x / Bacteria: x      Culture Results:   No growth to date. ( @ 16:30)  Culture Results:   No growth to date. ( @ 16:30)            Culture - Blood (collected 2022 16:30)  Source: .Blood Blood-Peripheral  Preliminary Report (15 Juan 2022 17:01):    No growth to date.    Culture - Blood (collected 2022 16:30)  Source: .Blood Blood-Peripheral  Preliminary Report (15 Juan 2022 17:01):    No growth to date.       Anemia Panel:  Ferritin, Serum: 113 ng/mL (06-15-22 @ 10:55)      Thyroid Panel:                RADIOLOGY & ADDITIONAL TESTS:      HEALTH ISSUES - PROBLEM Dx:  Atrial fibrillation    2019 novel coronavirus disease (COVID-19)    TBI (traumatic brain injury)    HTN (hypertension)    HLD (hyperlipidemia)    CAD (coronary artery disease)    Constipation    BPH (benign prostatic hyperplasia)    Major depression    Need for prophylactic measure          Consultant(s) Notes Reviewed:  [ x ] YES     Care Discussed with [ x ] Consultants, [ x ] Patient, [ x ] Family- wife , [  ] HCP, [x  ] , [x  ] Social Service, [x  ] RN, [  ] Physical Therapy, [  ] Palliative Care Team  DVT PPX: [  ] Lovenox, [  ] SC Heparin, [  ] Coumadin, [  ] Xarelto, [ x ] Eliquis, [  ] Pradaxa, [  ] IV Heparin drip, [  ] SCD, [  ] Ambulation, [  ] Contraindicated 2/2 GI Bleed, [  ] Contraindicated 2/2  Bleed, [  ] Contraindicated 2/2 Brain Bleed  Advanced Directive: [ x ] None, [  ] DNR/DNI Patient is a 72y old  Male who presents with a chief complaint of Afib w/ RVR + COVID (2022 16:10)      HPI:  Pt is a 72 y.o. M with a PMH of HTN, AFIB, BPH, hx of TBI, HLD, CAD, peripheral neuropathy, found to be COVID+ who presented to the ED with CC of fatigue. Pt is not the best historian after having TBI 2/2 fall on AC, so history taken via phone from wife. Pt reports that this morning he woke up "feeling lousy" with a fever and a cough. Pt tested COVID + today. Pt restarted eliquis 2 days ago, and follows up with Dr. Goldsmith for cardiology while outpatient. Pt endorses fever, lethargy diminished sensation on hands and feet 2/2 neuropathy, loss of balance, cough, constipation (last BM yesterday, but before that 1 week prior). Denies CP, palpitations, n/v/d, abdominal pain, leg pain. as per wife pt had some friends who visited pt & pt also has 2 HHA .    In the ED:  T 101.2 F Oral  /90 RR 18 SpO2 98% on RA  WBC 11.44 INR 1.3 Glucose 154 Procal 0.11 COVID +  CT Head Non-Con: Mild to moderate chronic microvascular changes. 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   CXR: Lung Clear.  ECG: AFib RVR  2L NS Bolus x1, 1g IV Tylenol, 1g Rocephin (2022 13:13)      INTERVAL HPI:  6/15/22: Patient seen and examined at bedside. No acute events overnight. Patient on BB CCB w/ rate-controlled HR ~80's. On remdesivir for COVID. saturating well on RA. On eliquis for Afib/DVT. Endorses feeling better without significant dyspnea or cough, but has some fatigue/malaise. ON Remdisivir IVPB daily   22: Patient seen and examined at bedside. No acute events overnight. Rate controlled. HDS. On remdesivir saturating well on RA. Endourses cough but no dyspnea. "I Feel well." S/P IV Remdesivir daily x 3 dose.  22: Patient seen and examined at bedside. No acute events overnight. s/p remdesivir x3d saturating well on RA. Continued cough no dyspnea. "I feel well." rate controlled increased to cardizem 120 ER qd w/ STAT dose today. dc planning -> per , unable to obtain home health aide today, will attempt over weekend.    OVERNIGHT EVENTS: none    Home Medications:  aspirin 81 mg oral tablet: 1 tab(s) orally once a day (2022 13:52)  bethanechol 10 mg oral tablet: 1 tab(s) orally 3 times a day at (8am, 3pm, 10pm) (2022 13:52)  buPROPion 75 mg oral tablet: 1 tab(s) orally 2 times a day (10am, 10pm) (2022 13:52)  Eliquis 2.5 mg oral tablet: 1 tab(s) orally 2 times a day (10am, 10pm)t (2022 13:52)  eplerenone 25 mg oral tablet: 1 tab(s) orally once a day (2022 13:52)  gabapentin 300 mg oral capsule: 1 cap(s) orally 3 times a day (8am, 3pm, 10pm (2022 13:52)  hydrALAZINE 50 mg oral tablet: 1 tab(s) orally 2 times a day (2022 13:52)  latanoprost 0.005% ophthalmic solution: 1 drop(s) to each affected eye once a day (in the evening) (2022 13:52)  losartan 100 mg oral tablet: 1 tab(s) orally once a day (2022 13:52)  methylphenidate 10 mg oral tablet: 2 tab(s) orally once a day (in the morning) (2022 13:52)  rosuvastatin 10 mg oral tablet: 1 tab(s) orally once a day (2022 13:52)  tamsulosin 0.4 mg oral capsule: 1 cap(s) orally once a day (in the evening) (2022 13:52)  Tiadylt  mg/24 hours oral capsule, extended release: 1 cap(s) orally once a day (2022 13:52)  tiZANidine 4 mg oral tablet: 1 tab(s) orally 3 times a day (2022 13:52)  Vitamin D3 25 mcg (1000 intl units) oral tablet: 1 tab(s) orally once a day (2022 13:52)      MEDICATIONS  (STANDING):  apixaban 2.5 milliGRAM(s) Oral two times a day  aspirin  chewable 81 milliGRAM(s) Oral daily  atorvastatin 40 milliGRAM(s) Oral at bedtime  bethanechol 10 milliGRAM(s) Oral three times a day  buPROPion XL (24-Hour) . 150 milliGRAM(s) Oral daily  cholecalciferol 1000 Unit(s) Oral daily  diltiazem    Tablet 30 milliGRAM(s) Oral every 6 hours  gabapentin 300 milliGRAM(s) Oral three times a day  latanoprost 0.005% Ophthalmic Solution 1 Drop(s) Both EYES at bedtime  methylphenidate 20 milliGRAM(s) Oral daily  polyethylene glycol 3350 17 Gram(s) Oral two times a day  senna 2 Tablet(s) Oral at bedtime  tamsulosin 0.4 milliGRAM(s) Oral at bedtime  tiZANidine 4 milliGRAM(s) Oral three times a day    MEDICATIONS  (PRN):  acetaminophen     Tablet .. 650 milliGRAM(s) Oral every 6 hours PRN Temp greater or equal to 38C (100.4F), Mild Pain (1 - 3)  aluminum hydroxide/magnesium hydroxide/simethicone Suspension 30 milliLiter(s) Oral every 4 hours PRN Dyspepsia  guaiFENesin Oral Liquid (Sugar-Free) 200 milliGRAM(s) Oral every 6 hours PRN Cough  melatonin 3 milliGRAM(s) Oral at bedtime PRN Insomnia  ondansetron Injectable 4 milliGRAM(s) IV Push every 8 hours PRN Nausea and/or Vomiting      No Known Allergies      Social History:  Vaccinated for COVID 3/3, last shot in february with moderna. Ambulates without assistance- pt is bedbound and wheelchair bound. Pt is reliant on wife for ADLs. Denies alcohol, tobacco, or recreational drug use. (2022 13:13)      REVIEW OF SYSTEMS:  CONSTITUTIONAL: No fever, No chills, No fatigue, No myalgia, No Body ache, ADMITS Weakness  EYES: No eye pain,  No visual disturbances, No discharge, No Redness  ENMT: No ear pain, No nose bleed, No vertigo; No sinus pain, No throat pain, No Congestion  NECK: No pain, No stiffness  RESPIRATORY: ADMITS cough, No wheezing, No hemoptysis, No shortness of breath  CARDIOVASCULAR: No chest pain, No palpitations  GASTROINTESTINAL: No abdominal pain, No epigastric pain. No nausea, No vomiting, No diarrhea, No constipation; [ x ] BM  GENITOURINARY: No dysuria, No frequency, No urgency, No hematuria, No incontinence  NEUROLOGICAL: No headaches, No dizziness, No numbness, No tingling, No tremors, No weakness  EXTREMITIES: No Swelling, No Pain, No Edema  SKIN: [ x ] No itching, burning, rashes, or lesions   MUSCULOSKELETAL: No joint pain, No joint swelling; No muscle pain, No back pain, No extremity pain  PSYCHIATRIC: No depression, No anxiety, No mood swings, No difficulty sleeping at night  PAIN SCALE: [ x ] None  [  ] Other-  ROS Unable to obtain due to: [  ] Dementia  [  ] Lethargy  [  ] Sedated  [  ] Non verbal  REST OF REVIEW OF SYSTEMS: [ x ] Normal     Vital Signs Last 24 Hrs  T(C): 36.9 (2022 06:31), Max: 36.9 (2022 09:03)  T(F): 98.4 (2022 06:31), Max: 98.4 (2022 09:03)  HR: 92 (2022 06:31) (70 - 98)  BP: 141/99 (2022 06:31) (100/50 - 141/99)  BP(mean): --  RR: 18 (2022 06:31) (18 - 18)  SpO2: 93% (2022 06:31) (93% - 96%)    CAPILLARY BLOOD GLUCOSE          I&O's Summary    2022 07:01  -  2022 07:00  --------------------------------------------------------  IN: 100 mL / OUT: 1200 mL / NET: -1100 mL      PHYSICAL EXAM:  GENERAL:  [ x ] NAD, [  x] Well appearing, [  ] Agitated, [  ] Drowsy, [  ] Lethargy, [  ] Confused   HEAD:  [ x ] Normal, [  ] Other  EYES:  [ x ] EOMI, [  ] PERRLA, [x  ] Conjunctiva and sclera clear normal, [  ] Other, [  ] Pallor, [  ] Discharge  ENMT:  [ x ] Normal, [ x ] Moist mucous membranes, [ x ] Good dentition, [ x ] No thrush  NECK:  [ x ] Supple, [ x ] No JVD, [ x ] Normal thyroid, [  ] Lymphadenopathy, [  ] Other  CHEST/LUNG:  [ x ] Clear to auscultation bilaterally, [ x ] Breath Sounds equal B/L  [  ] Poor effort, [x  ] No rales, [  x] No rhonchi, [ x ] No wheezing  HEART:  [ x ] Regular rate, [  ] Tachycardia, [  ] Bradycardia, [ x ] Irregular, [x  ] No murmurs, No rubs, No gallops, [  ] PPM in place (Mfr:  )  ABDOMEN:  [ x ] Soft, [x  ] Nontender, [ x ] Nondistended, [ x ] No mass, [ x ] Bowel sounds present, [  ] Obese  NERVOUS SYSTEM:  [ x ] Alert & Oriented x3, [ x ] Nonfocal, [  ] Confusion, [  ] Encephalopathic, [  ] Sedated, [  ] Unable to assess, [  ] Dementia, [ x ] Other-contractures EXT & Hand B/L   EXTREMITIES:  [ x ] 2+ Peripheral Pulses, No clubbing, No cyanosis,  [  ] Edema B/L lower EXT, [  ] PVD stasis skin changes B/L lower EXT, [  ] Wound [ x ] spasticity of all 4 extremities with prominent contractures of extensors of hands b/l  LYMPH:  No lymphadenopathy noted  SKIN:  [  ] No rashes or lesions, [  ] Pressure ulcers, [  ] Ecchymosis, [  ] Skin tears, [  ] Other    DIET: Diet, Regular (22 @ 14:17)      LABS:                        12.7   6.94  )-----------( 171      ( 2022 07:55 )             38.4     2022 07:55    143    |  110    |  23     ----------------------------<  103    3.8     |  26     |  0.87     Ca    8.4        2022 07:55    TPro  5.5    /  Alb  2.8    /  TBili  0.3    /  DBili  x      /  AST  12     /  ALT  18     /  AlkPhos  77     2022 07:55    PT/INR - ( 2022 07:55 )   PT: 15.5 sec;   INR: 1.32 ratio           Urinalysis Basic - ( 2022 08:11 )    Color: Yellow / Appearance: Clear / S.020 / pH: x  Gluc: x / Ketone: Negative  / Bili: Negative / Urobili: Negative   Blood: x / Protein: 15 / Nitrite: Negative   Leuk Esterase: Trace / RBC: x / WBC x   Sq Epi: x / Non Sq Epi: x / Bacteria: x      Culture Results:   No growth to date. ( @ 16:30)  Culture Results:   No growth to date. ( @ 16:30)            Culture - Blood (collected 2022 16:30)  Source: .Blood Blood-Peripheral  Preliminary Report (15 Juan 2022 17:01):    No growth to date.    Culture - Blood (collected 2022 16:30)  Source: .Blood Blood-Peripheral  Preliminary Report (15 Juan 2022 17:01):    No growth to date.       Anemia Panel:  Ferritin, Serum: 113 ng/mL (06-15-22 @ 10:55)      Thyroid Panel:                RADIOLOGY & ADDITIONAL TESTS:      HEALTH ISSUES - PROBLEM Dx:  Atrial fibrillation    2019 novel coronavirus disease (COVID-19)    TBI (traumatic brain injury)    HTN (hypertension)    HLD (hyperlipidemia)    CAD (coronary artery disease)    Constipation    BPH (benign prostatic hyperplasia)    Major depression    Need for prophylactic measure          Consultant(s) Notes Reviewed:  [ x ] YES     Care Discussed with [ x ] Consultants, [ x ] Patient, [ x ] Family- wife , [  ] HCP, [x  ] , [x  ] Social Service, [x  ] RN, [  ] Physical Therapy, [  ] Palliative Care Team  DVT PPX: [  ] Lovenox, [  ] SC Heparin, [  ] Coumadin, [  ] Xarelto, [ x ] Eliquis, [  ] Pradaxa, [  ] IV Heparin drip, [  ] SCD, [  ] Ambulation, [  ] Contraindicated 2/2 GI Bleed, [  ] Contraindicated 2/2  Bleed, [  ] Contraindicated 2/2 Brain Bleed  Advanced Directive: [ x ] None, [  ] DNR/DNI Patient is a 72y old  Male who presents with a chief complaint of Afib w/ RVR + COVID (2022 16:10)      HPI:  Pt is a 72 y.o. M with a PMH of HTN, AFIB, BPH, hx of TBI, HLD, CAD, peripheral neuropathy, found to be COVID+ who presented to the ED with CC of fatigue. Pt is not the best historian after having TBI 2/2 fall on AC, so history taken via phone from wife. Pt reports that this morning he woke up "feeling lousy" with a fever and a cough. Pt tested COVID + today. Pt restarted eliquis 2 days ago, and follows up with Dr. Goldsmith for cardiology while outpatient. Pt endorses fever, lethargy diminished sensation on hands and feet 2/2 neuropathy, loss of balance, cough, constipation (last BM yesterday, but before that 1 week prior). Denies CP, palpitations, n/v/d, abdominal pain, leg pain. as per wife pt had some friends who visited pt & pt also has 2 HHA .    In the ED:  T 101.2 F Oral  /90 RR 18 SpO2 98% on RA  WBC 11.44 INR 1.3 Glucose 154 Procal 0.11 COVID +  CT Head Non-Con: Mild to moderate chronic microvascular changes. 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   CXR: Lung Clear.  ECG: AFib RVR  2L NS Bolus x1, 1g IV Tylenol, 1g Rocephin (2022 13:13)      INTERVAL HPI:  6/15/22: Patient seen and examined at bedside. No acute events overnight. Patient on BB CCB w/ rate-controlled HR ~80's. On remdesivir for COVID. saturating well on RA. On eliquis for Afib/DVT. Endorses feeling better without significant dyspnea or cough, but has some fatigue/malaise. ON Remdisivir IVPB daily   22: Patient seen and examined at bedside. No acute events overnight. Rate controlled. HDS. On remdesivir saturating well on RA. Endourses cough but no dyspnea. "I Feel well." S/P IV Remdesivir daily x 3 dose.  22: Patient seen and examined at bedside. No acute events overnight. s/p remdesivir x3d saturating well on RA. Continued cough no dyspnea. "I feel well." rate controlled increased to cardizem 120 ER qd w/ STAT dose today. dc planning -> per , unable to obtain home health aide today, will attempt over weekend ".i want to go home"    OVERNIGHT EVENTS: none    Home Medications:  aspirin 81 mg oral tablet: 1 tab(s) orally once a day (2022 13:52)  bethanechol 10 mg oral tablet: 1 tab(s) orally 3 times a day at (8am, 3pm, 10pm) (2022 13:52)  buPROPion 75 mg oral tablet: 1 tab(s) orally 2 times a day (10am, 10pm) (2022 13:52)  Eliquis 2.5 mg oral tablet: 1 tab(s) orally 2 times a day (10am, 10pm)t (2022 13:52)  eplerenone 25 mg oral tablet: 1 tab(s) orally once a day (:52)  gabapentin 300 mg oral capsule: 1 cap(s) orally 3 times a day (8am, 3pm, 10pm (2022 13:52)  hydrALAZINE 50 mg oral tablet: 1 tab(s) orally 2 times a day (2022 13:52)  latanoprost 0.005% ophthalmic solution: 1 drop(s) to each affected eye once a day (in the evening) (2022 13:52)  losartan 100 mg oral tablet: 1 tab(s) orally once a day (2022 13:52)  methylphenidate 10 mg oral tablet: 2 tab(s) orally once a day (in the morning) (2022 13:52)  rosuvastatin 10 mg oral tablet: 1 tab(s) orally once a day (2022 13:52)  tamsulosin 0.4 mg oral capsule: 1 cap(s) orally once a day (in the evening) (2022 13:52)  Tiadylt  mg/24 hours oral capsule, extended release: 1 cap(s) orally once a day (2022 13:52)  tiZANidine 4 mg oral tablet: 1 tab(s) orally 3 times a day (2022 13:52)  Vitamin D3 25 mcg (1000 intl units) oral tablet: 1 tab(s) orally once a day (2022 13:52)      MEDICATIONS  (STANDING):  apixaban 2.5 milliGRAM(s) Oral two times a day  aspirin  chewable 81 milliGRAM(s) Oral daily  atorvastatin 40 milliGRAM(s) Oral at bedtime  bethanechol 10 milliGRAM(s) Oral three times a day  buPROPion XL (24-Hour) . 150 milliGRAM(s) Oral daily  cholecalciferol 1000 Unit(s) Oral daily  diltiazem    Tablet 30 milliGRAM(s) Oral every 6 hours  gabapentin 300 milliGRAM(s) Oral three times a day  latanoprost 0.005% Ophthalmic Solution 1 Drop(s) Both EYES at bedtime  methylphenidate 20 milliGRAM(s) Oral daily  polyethylene glycol 3350 17 Gram(s) Oral two times a day  senna 2 Tablet(s) Oral at bedtime  tamsulosin 0.4 milliGRAM(s) Oral at bedtime  tiZANidine 4 milliGRAM(s) Oral three times a day    MEDICATIONS  (PRN):  acetaminophen     Tablet .. 650 milliGRAM(s) Oral every 6 hours PRN Temp greater or equal to 38C (100.4F), Mild Pain (1 - 3)  aluminum hydroxide/magnesium hydroxide/simethicone Suspension 30 milliLiter(s) Oral every 4 hours PRN Dyspepsia  guaiFENesin Oral Liquid (Sugar-Free) 200 milliGRAM(s) Oral every 6 hours PRN Cough  melatonin 3 milliGRAM(s) Oral at bedtime PRN Insomnia  ondansetron Injectable 4 milliGRAM(s) IV Push every 8 hours PRN Nausea and/or Vomiting      No Known Allergies      Social History:  Vaccinated for COVID 3/3, last shot in february with moderna. Ambulates without assistance- pt is bedbound and wheelchair bound. Pt is reliant on wife for ADLs. Denies alcohol, tobacco, or recreational drug use. (2022 13:13)      REVIEW OF SYSTEMS: i am OK, No Complaints   CONSTITUTIONAL: No fever, No chills, No fatigue, No myalgia, No Body ache, ADMITS Weakness  EYES: No eye pain,  No visual disturbances, No discharge, No Redness  ENMT: No ear pain, No nose bleed, No vertigo; No sinus pain, No throat pain, No Congestion  NECK: No pain, No stiffness  RESPIRATORY: ADMITS cough, No wheezing, No hemoptysis, No shortness of breath  CARDIOVASCULAR: No chest pain, No palpitations  GASTROINTESTINAL: No abdominal pain, No epigastric pain. No nausea, No vomiting, No diarrhea, No constipation; [ x ] BM  GENITOURINARY: No dysuria, No frequency, No urgency, No hematuria, No incontinence  NEUROLOGICAL: No headaches, No dizziness, No numbness, No tingling, No tremors, No weakness  EXTREMITIES: No Swelling, No Pain, No Edema  SKIN: [ x ] No itching, burning, rashes, or lesions   MUSCULOSKELETAL: No joint pain, No joint swelling; No muscle pain, No back pain, No extremity pain  PSYCHIATRIC: No depression, No anxiety, No mood swings, No difficulty sleeping at night  PAIN SCALE: [ x ] None  [  ] Other-  ROS Unable to obtain due to: [  ] Dementia  [  ] Lethargy  [  ] Sedated  [  ] Non verbal  REST OF REVIEW OF SYSTEMS: [ x ] Normal     Vital Signs Last 24 Hrs  T(C): 36.9 (2022 06:31), Max: 36.9 (2022 09:03)  T(F): 98.4 (2022 06:31), Max: 98.4 (2022 09:03)  HR: 92 (2022 06:31) (70 - 98)  BP: 141/99 (2022 06:31) (100/50 - 141/99)  BP(mean): --  RR: 18 (2022 06:31) (18 - 18)  SpO2: 93% (2022 06:31) (93% - 96%)    CAPILLARY BLOOD GLUCOSE          I&O's Summary    2022 07:01  -  2022 07:00  --------------------------------------------------------  IN: 100 mL / OUT: 1200 mL / NET: -1100 mL      PHYSICAL EXAM:  GENERAL:  [ x ] NAD, [  x] Well appearing, [  ] Agitated, [  ] Drowsy, [  ] Lethargy, [  ] Confused   HEAD:  [ x ] Normal, [  ] Other  EYES:  [ x ] EOMI, [  ] PERRLA, [x  ] Conjunctiva and sclera clear normal, [  ] Other, [  ] Pallor, [  ] Discharge  ENMT:  [ x ] Normal, [ x ] Moist mucous membranes, [ x ] Good dentition, [ x ] No thrush  NECK:  [ x ] Supple, [ x ] No JVD, [ x ] Normal thyroid, [  ] Lymphadenopathy, [  ] Other  CHEST/LUNG:  [ x ] Clear to auscultation bilaterally, [ x ] Breath Sounds equal B/L  [  ] Poor effort, [x  ] No rales, [  x] No rhonchi, [ x ] No wheezing  HEART:  [ x ] Regular rate, [  ] Tachycardia, [  ] Bradycardia, [ x ] Irregular, [x  ] No murmurs, No rubs, No gallops, [  ] PPM in place (Mfr:  )  ABDOMEN:  [ x ] Soft, [x  ] Nontender, [ x ] Nondistended, [ x ] No mass, [ x ] Bowel sounds present, [  ] Obese  NERVOUS SYSTEM:  [ x ] Alert & Oriented x3, [ x ] Nonfocal, [  ] Confusion, [  ] Encephalopathic, [  ] Sedated, [  ] Unable to assess, [  ] Dementia, [ x ] Other-contractures EXT & Hand B/L   EXTREMITIES:  [ x ] 2+ Peripheral Pulses, No clubbing, No cyanosis,  [  ] Edema B/L lower EXT, [  ] PVD stasis skin changes B/L lower EXT, [  ] Wound [ x ] spasticity of all 4 extremities with prominent contractures of extensors of hands b/l  LYMPH:  No lymphadenopathy noted  SKIN:  [ x ] No rashes or lesions, [  ] Pressure ulcers, [  ] Ecchymosis, [  ] Skin tears, [  ] Other    DIET: Diet, Regular (22 @ 14:17)      LABS:                        12.7   6.94  )-----------( 171      ( 2022 07:55 )             38.4     2022 07:55    143    |  110    |  23     ----------------------------<  103    3.8     |  26     |  0.87     Ca    8.4        2022 07:55    TPro  5.5    /  Alb  2.8    /  TBili  0.3    /  DBili  x      /  AST  12     /  ALT  18     /  AlkPhos  77     2022 07:55    PT/INR - ( 2022 07:55 )   PT: 15.5 sec;   INR: 1.32 ratio           Urinalysis Basic - ( 2022 08:11 )    Color: Yellow / Appearance: Clear / S.020 / pH: x  Gluc: x / Ketone: Negative  / Bili: Negative / Urobili: Negative   Blood: x / Protein: 15 / Nitrite: Negative   Leuk Esterase: Trace / RBC: x / WBC x   Sq Epi: x / Non Sq Epi: x / Bacteria: x      Culture Results:   No growth to date. ( @ 16:30)  Culture Results:   No growth to date. ( @ 16:30)      Culture - Blood (collected 2022 16:30)  Source: .Blood Blood-Peripheral  Preliminary Report (15 Juan 2022 17:01):    No growth to date.    Culture - Blood (collected 2022 16:30)  Source: .Blood Blood-Peripheral  Preliminary Report (15 Juan 2022 17:01):    No growth to date.       Anemia Panel:  Ferritin, Serum: 113 ng/mL (06-15-22 @ 10:55)      RADIOLOGY & ADDITIONAL TESTS:NONE  < from: TTE Echo Complete w/o Contrast w/ Doppler (06.15.22 @ 17:19) >    OBSERVATIONS:  Mitral Valve: Trace to mild MR.  Aortic Valve/Aorta: normal trileaflet aortic valve.  Tricuspid Valve: normal with trace TR.  Pulmonic Valve: Trace PI  Left Atrium: Enlarged  Right Atrium: Not well-visualized  Left Ventricle: Mild left ventricular hypertrophy with normal systolic   function, estimated LVEF of 60%.  Right Ventricle: Grossly normal size and systolic function.  Pericardium: no significant pericardial effusion.  Pulmonary/RV Pressure: estimated PA systolic pressure of 18 mmHg          IMPRESSION:  Mild left ventricular hypertrophy with normal systolic function,   estimated LVEF of 60%.  Grossly normal RV size and systolic function.  Left atrial enlargement  Normal trileaflet aortic valve, without AI.  Trace to mild MR  Trace TR.  No significant pericardial effusion.    --- End of Report ---    < end of copied text >    HEALTH ISSUES - PROBLEM Dx:  Atrial fibrillation    2019 novel coronavirus disease (COVID-19)    TBI (traumatic brain injury)    HTN (hypertension)    HLD (hyperlipidemia)    CAD (coronary artery disease)    Constipation    BPH (benign prostatic hyperplasia)    Major depression    Need for prophylactic measure          Consultant(s) Notes Reviewed:  [ x ] YES     Care Discussed with [ x ] Consultants, [ x ] Patient, [ x ] Family- wife , [  ] HCP, [x  ] , [x  ] Social Service, [x  ] RN, [  ] Physical Therapy, [  ] Palliative Care Team  DVT PPX: [  ] Lovenox, [  ] SC Heparin, [  ] Coumadin, [  ] Xarelto, [ x ] Eliquis, [  ] Pradaxa, [  ] IV Heparin drip, [  ] SCD, [  ] Ambulation, [  ] Contraindicated 2/2 GI Bleed, [  ] Contraindicated 2/2  Bleed, [  ] Contraindicated 2/2 Brain Bleed  Advanced Directive: [ x ] None, [  ] DNR/DNI

## 2022-06-17 NOTE — DISCHARGE NOTE NURSING/CASE MANAGEMENT/SOCIAL WORK - PATIENT PORTAL LINK FT
You can access the FollowMyHealth Patient Portal offered by [facility] by registering at the following website: [email or web address]. By joining [facility]’s FollowMyHealth portal, you will also be able to view your health information using other applications (apps) compatible with our system.

## 2022-06-17 NOTE — DISCHARGE NOTE PROVIDER - NSDCMRMEDTOKEN_GEN_ALL_CORE_FT
aspirin 81 mg oral tablet: 1 tab(s) orally once a day  bethanechol 10 mg oral tablet: 1 tab(s) orally 3 times a day at (8am, 3pm, 10pm)  buPROPion 75 mg oral tablet: 1 tab(s) orally 2 times a day (10am, 10pm)  Cardizem  mg/24 hours oral capsule, extended release: 1 cap(s) orally once a day   Eliquis 2.5 mg oral tablet: 1 tab(s) orally 2 times a day (10am, 10pm)t  gabapentin 300 mg oral capsule: 1 cap(s) orally 3 times a day (8am, 3pm, 10pm  latanoprost 0.005% ophthalmic solution: 1 drop(s) to each affected eye once a day (in the evening)  methylphenidate 10 mg oral tablet: 2 tab(s) orally once a day (in the morning)  rosuvastatin 10 mg oral tablet: 1 tab(s) orally once a day  tamsulosin 0.4 mg oral capsule: 1 cap(s) orally once a day (in the evening)  tiZANidine 4 mg oral tablet: 1 tab(s) orally 3 times a day  Vitamin D3 25 mcg (1000 intl units) oral tablet: 1 tab(s) orally once a day   aspirin 81 mg oral tablet: 1 tab(s) orally once a day  bethanechol 10 mg oral tablet: 1 tab(s) orally 3 times a day at (8am, 3pm, 10pm)  buPROPion 75 mg oral tablet: 1 tab(s) orally 2 times a day (10am, 10pm)  Cardizem  mg/24 hours oral capsule, extended release: 1 cap(s) orally once a day   Eliquis 2.5 mg oral tablet: 1 tab(s) orally 2 times a day (10am, 10pm)t  gabapentin 300 mg oral capsule: 1 cap(s) orally 3 times a day (8am, 3pm, 10pm  latanoprost 0.005% ophthalmic solution: 1 drop(s) to each affected eye once a day (in the evening)  losartan 25 mg oral tablet: 1 tab(s) orally once a day   methylphenidate 10 mg oral tablet: 2 tab(s) orally once a day (in the morning)  rosuvastatin 10 mg oral tablet: 1 tab(s) orally once a day  tamsulosin 0.4 mg oral capsule: 1 cap(s) orally once a day (in the evening)  tiZANidine 4 mg oral tablet: 1 tab(s) orally 3 times a day  Vitamin D3 25 mcg (1000 intl units) oral tablet: 1 tab(s) orally once a day

## 2022-06-17 NOTE — PROGRESS NOTE ADULT - SUBJECTIVE AND OBJECTIVE BOX
[facility] DIVISION of INFECTIOUS DISEASE  Ovidio Manning MD PhD, Jojo Boggs MD, Hafsa Bhardwaj MD, Danica Goldsmith MD, Samy Terry MD  and providing coverage with Meet Perea MD  Providing Infectious Disease Consultations at [facility], [facility], [facility]'s      Office# [phone] to schedule follow up appointments  Answering Service for urgent calls or New Consults [phone]  Cell# to text for urgent issues Ovidio Manning [phone]     Infectious diseases progress note:    MELBA PARMAR is a 72y y. o. Male patient    COVID Patient    Allergies    No Known Allergies    Intolerances        ANTIBIOTICS/RELEVANT:  antimicrobials    immunologic:    OTHER:  acetaminophen     Tablet .. 650 milliGRAM(s) Oral every 6 hours PRN  aluminum hydroxide/magnesium hydroxide/simethicone Suspension 30 milliLiter(s) Oral every 4 hours PRN  apixaban 2.5 milliGRAM(s) Oral two times a day  aspirin  chewable 81 milliGRAM(s) Oral daily  atorvastatin 40 milliGRAM(s) Oral at bedtime  bethanechol 10 milliGRAM(s) Oral three times a day  buPROPion XL (24-Hour) . 150 milliGRAM(s) Oral daily  cholecalciferol 1000 Unit(s) Oral daily  diltiazem    milliGRAM(s) Oral daily  gabapentin 300 milliGRAM(s) Oral three times a day  guaiFENesin Oral Liquid (Sugar-Free) 200 milliGRAM(s) Oral every 6 hours PRN  latanoprost 0.005% Ophthalmic Solution 1 Drop(s) Both EYES at bedtime  melatonin 3 milliGRAM(s) Oral at bedtime PRN  methylphenidate 20 milliGRAM(s) Oral daily  ondansetron Injectable 4 milliGRAM(s) IV Push every 8 hours PRN  polyethylene glycol 3350 17 Gram(s) Oral two times a day  senna 2 Tablet(s) Oral at bedtime  tamsulosin 0.4 milliGRAM(s) Oral at bedtime  tiZANidine 4 milliGRAM(s) Oral three times a day      Objective:  Vital Signs Last 24 Hrs  T(C): 36.9 (2022 06:31), Max: 36.9 (2022 06:31)  T(F): 98.4 (2022 06:31), Max: 98.4 (2022 06:31)  HR: 92 (2022 06:31) (81 - 98)  BP: 141/99 (2022 06:31) (110/67 - 141/99)  BP(mean): --  RR: 18 (2022 06:31) (18 - 18)  SpO2: 93% (:31) (93% - 95%)    T(C): 36.9 (22 @ 06:31), Max: 37.2 (06-15-22 @ 12:28)  T(C): 36.9 (22 @ 06:31), Max: 38.4 (22 @ 09:53)  T(C): 36.9 (22 @ 06:31), Max: 38.4 (22 @ 09:53)    PHYSICAL EXAM:  HEENT: NC atraumatic  Neck: supple  Respiratory: no accessory muscle use, breathing comfortably  Cardiovascular: distant  Gastrointestinal: normal appearing, nondistended  Extremities: no clubbing, no cyanosis,        LABS:                          12.7   6.94  )-----------( 171      ( 2022 07:55 )             38.4       WBC  6.94  @ 07:55  8.47 06-16 @ 07:49  8.66 06-15 @ 08:04  11.44 06-14 @ 11:03      06-17    143  |  110<H>  |  23  ----------------------------<  103<H>  3.8   |  26  |  0.87    Ca    8.4<L>      2022 07:55    TPro  5.5<L>  /  Alb  2.8<L>  /  TBili  0.3  /  DBili  0.1  /  AST  12<L>  /  ALT  18  /  AlkPhos  77        Creatinine, Serum: 0.87 mg/dL (22 @ 07:55)  Creatinine, Serum: 0.92 mg/dL (22 @ 16:58)  Creatinine, Serum: 0.99 mg/dL (22 @ 07:49)  Creatinine, Serum: 1.10 mg/dL (06-15-22 @ 08:04)  Creatinine, Serum: 0.99 mg/dL (22 @ 16:29)  Creatinine, Serum: 1.20 mg/dL (22 @ 11:03)      PT/INR - ( 2022 07:55 )   PT: 15.5 sec;   INR: 1.32 ratio           Urinalysis Basic - ( 2022 08:11 )    Color: Yellow / Appearance: Clear / S.020 / pH: x  Gluc: x / Ketone: Negative  / Bili: Negative / Urobili: Negative   Blood: x / Protein: 15 / Nitrite: Negative   Leuk Esterase: Trace / RBC: 0-2 /HPF / WBC 0-2   Sq Epi: x / Non Sq Epi: Occasional / Bacteria: x            COVID RISK SCORE  Auto Neutrophil #: 4.66 K/uL (22 @ 07:55)  Auto Lymphocyte #: 1.05 K/uL (22 @ 07:55)  Auto Neutrophil #: 6.28 K/uL (22 @ 07:49)  Auto Lymphocyte #: 0.90 K/uL (22 @ 07:49)  Auto Neutrophil #: 9.72 K/uL (22 @ 11:03)  Auto Lymphocyte #: 0.80 K/uL (22 @ 11:03)    Lactate, Blood: 1.3 mmol/L (22 @ 11:03)    Auto Eosinophil #: 0.48 K/uL (22 @ 07:55)  Auto Eosinophil #: 0.27 K/uL (22 @ 07:49)  Auto Eosinophil #: 0.11 K/uL (22 @ 11:03)      Sedimentation Rate, Erythrocyte: 2 mm/hr (22 @ 11:03)    Procalcitonin, Serum: 0.11 ng/mL (22 @ 11:03)            Ferritin, Serum: 113 ng/mL (06-15-22 @ 10:55)        INR: 1.32 ratio (22 @ 07:55)  INR: 1.36 ratio (22 @ 16:58)  INR: 1.36 ratio (22 @ 07:49)  INR: 1.33 ratio (06-15-22 @ 08:04)  INR: 1.26 ratio (22 @ 16:29)  Activated Partial Thromboplastin Time: 37.0 sec (22 @ 11:03)  INR: 1.30 ratio (22 @ 11:03)    D-Dimer Assay, Quantitative: 154 ng/mL DDU (06-15-22 @ 08:04)  D-Dimer Assay, Quantitative: <150 ng/mL DDU (22 @ 16:29)        MICROBIOLOGY:              SARS-CoV-2: Detected (2022 11:19)  COVID-19 PCR: Detected (2022 11:03)      RADIOLOGY & ADDITIONAL STUDIES:

## 2022-06-17 NOTE — DISCHARGE NOTE PROVIDER - NSDCHHNEEDSERVICEOTHER_GEN_ALL_CORE_FT
home health aids for help with ADLs in setting of prior TBI  home health aids for help with ADLs pt is bedbound and wheelchair bound  home PT, home health aids for help with ADLs pt is bedbound and wheelchair bound  home PT, home health aids for help with ADLs pt is bedbound and wheelchair bound

## 2022-06-17 NOTE — DISCHARGE NOTE PROVIDER - NSDCCPCAREPLAN_GEN_ALL_CORE_FT
PRINCIPAL DISCHARGE DIAGNOSIS  Diagnosis: COVID  Assessment and Plan of Treatment: You presented to the hospital with worsening shortness of breath and cough. You were started on an antiviral medication called remdesivir and finished a 3 day course as reccomended by our infectious disease team. Your breathing functioning improved through hospital course.  To Do:  - Continue taking your eliquis 2.5 mg twice per day by mouth as prescribed  - If you have worsening shortness of breath, have chest pain, or altered mental status, please come back to the ED for further management.  - Follow up with PCP within 2 weeks of discharge for further reccomendations.      SECONDARY DISCHARGE DIAGNOSES  Diagnosis: Atrial fibrillation  Assessment and Plan of Treatment: You presented to the hospital with elevated heart rates likely due to your history of atrial fibrillation in the setting of COVID. You were given metoprolol and cardizem to control your rates and your home regimen was changed per reccomendation of the cardiology team.  To Do:  - Please discontinue taking your home doses of metoprolol and cardizem  - Please start taking cardizem extended release 120 mg by mouth once per day. A 30 day supply was sent to your local pharmacy  - Continue taking your eliquis 2.5 mg twice per day by mouth as prescribed for anticoagulation  - Follow up with your PCP and cardiologist within 1 week of discharge for further recommendations.    Diagnosis: HTN (hypertension)  Assessment and Plan of Treatment: Your blood pressures were lower while in the hospital. We held your home anti-hypertensive regimen because of the low pressures. Cardiology recommended discontinuing     PRINCIPAL DISCHARGE DIAGNOSIS  Diagnosis: COVID  Assessment and Plan of Treatment: You presented to the hospital with worsening shortness of breath and cough. You were started on an antiviral medication called remdesivir and finished a 3 day course as reccomended by our infectious disease team. Your breathing functioning improved through hospital course.  To Do:  - Continue taking your eliquis 2.5 mg twice per day by mouth as prescribed  - If you have worsening shortness of breath, have chest pain, or altered mental status, please come back to the ED for further management.  - Follow up with PCP within 2 weeks of discharge for further reccomendations.      SECONDARY DISCHARGE DIAGNOSES  Diagnosis: Atrial fibrillation  Assessment and Plan of Treatment: You presented to the hospital with elevated heart rates likely due to your history of atrial fibrillation in the setting of COVID. You were given metoprolol and cardizem to control your rates and your home regimen was changed per reccomendation of the cardiology team.  To Do:  - Please discontinue taking your home doses of metoprolol and cardizem  - Please start taking cardizem extended release 120 mg by mouth once per day. A 30 day supply was sent to your local pharmacy  - Continue taking your eliquis 2.5 mg twice per day by mouth as prescribed for anticoagulation  - Follow up with your PCP and cardiologist, Dr. Goldsmith, within 1 week of discharge for further recommendations.    Diagnosis: HTN (hypertension)  Assessment and Plan of Treatment: Your blood pressures were lower while in the hospital. We held your home anti-hypertensive regimen because of the low pressures. Cardiology recommended discontinuing your home regimen indefinitely as your blood pressures were managed well while in the hospital with cardizem alone.  To Do:  - Please start taking cardizem extended release 120 mg by mouth once per day. A 30 day supply was sent to your local pharmacy  - Please discontinue taking your losartan, hydralazine, lopressor, and eplerenone.  - Follow up with your PCP and cardiologist, Dr. Goldsmith, within 1 week of discharge for further recommendations.       PRINCIPAL DISCHARGE DIAGNOSIS  Diagnosis: COVID  Assessment and Plan of Treatment: You presented to the hospital with worsening shortness of breath and cough. You were started on an antiviral medication called remdesivir and finished a 3 day course as reccomended by our infectious disease team. Your breathing functioning improved through hospital course.  To Do:  - Continue taking your eliquis 2.5 mg twice per day by mouth as prescribed  - If you have worsening shortness of breath, have chest pain, or altered mental status, please come back to the ED for further management.  - Follow up with PCP within 2 weeks of discharge for further reccomendations.      SECONDARY DISCHARGE DIAGNOSES  Diagnosis: Atrial fibrillation  Assessment and Plan of Treatment: You presented to the hospital with elevated heart rates likely due to your history of atrial fibrillation in the setting of COVID. You were given metoprolol and cardizem to control your rates and your home regimen was changed per reccomendation of the cardiology team.  To Do:  - Please discontinue taking your home doses of metoprolol and cardizem  - Please start taking cardizem extended release 120 mg by mouth once per day. A 30 day supply was sent to your local pharmacy.  - Continue taking your eliquis 2.5 mg twice per day by mouth as prescribed for anticoagulation  - Follow up with your PCP and cardiologist, Dr. Goldsmith, within 1 week of discharge for further recommendations.    Diagnosis: HTN (hypertension)  Assessment and Plan of Treatment: Your blood pressures were lower while in the hospital. We held your home anti-hypertensive regimen because of the low pressures. Cardiology recommended discontinuing your home regimen indefinitely as your blood pressures were managed well while in the hospital with cardizem alone.  To Do:  - Please discontinue taking your losartan, hydralazine, lopressor, and eplerenone.  - Please start taking cardizem extended release 120 mg by mouth once per day and losartan 25 mg once per day by mouth. A 30 day supply of each was sent to your local pharmacy  - Follow up with your PCP and cardiologist, Dr. Goldsmith, within 1 week of discharge for further recommendations.       PRINCIPAL DISCHARGE DIAGNOSIS  Diagnosis: COVID  Assessment and Plan of Treatment: You presented to the hospital with worsening shortness of breath and cough. You were started on an antiviral medication called remdesivir and finished a 3 day course as reccomended by our infectious disease team. Your breathing functioning improved through hospital course.  To Do:  - Continue taking your eliquis 2.5 mg twice per day by mouth as prescribed  - If you have worsening shortness of breath, have chest pain, or altered mental status, please come back to the ED for further management.  - Follow up with PCP within 2 weeks of discharge for further reccomendations.      SECONDARY DISCHARGE DIAGNOSES  Diagnosis: Atrial fibrillation  Assessment and Plan of Treatment: You presented to the hospital with elevated heart rates likely due to your history of atrial fibrillation in the setting of COVID. You were given metoprolol and cardizem to control your rates and your home regimen was changed per reccomendation of the cardiology team.  To Do:  - Please discontinue taking your home doses of metoprolol and cardizem  - Please start taking cardizem extended release 120 mg by mouth once per day. A 30 day supply was sent to your local pharmacy.  - Continue taking your eliquis 2.5 mg twice per day by mouth as prescribed for anticoagulation  - Follow up with your PCP and cardiologist, Dr. Goldsmith, within 1 week of discharge for further recommendations.    Diagnosis: HTN (hypertension)  Assessment and Plan of Treatment: Your blood pressures were lower while in the hospital. We held your home anti-hypertensive regimen because of the low pressures. Cardiology recommended discontinuing your home regimen indefinitely as your blood pressures were managed well while in the hospital with cardizem alone.  To Do:  - Please discontinue taking your hydralazine, lopressor, and eplerenone.  -START a lower dose of losartan 25 mg once a day (sent to your pharmacy) and STOP taking losartan 100 mg once a day   - Please start taking cardizem extended release 120 mg by mouth once per day and losartan 25 mg once per day by mouth. A 30 day supply of each was sent to your local pharmacy  - Follow up with your PCP and cardiologist, Dr. Goldsmith, within 1 week of discharge for further recommendations.

## 2022-06-17 NOTE — DISCHARGE NOTE PROVIDER - HOSPITAL COURSE
HPI:  Pt is a 72 y.o. M with a PMH of HTN, AFIB, BPH, hx of TBI, HLD, CAD, peripheral neuropathy, found to be COVID+ who presented to the ED with CC of fatigue. Pt is not the best historian after having TBI 2/2 fall on AC, so history taken via phone from wife. Pt reports that this morning he woke up "feeling lousy" with a fever and a cough. Pt tested COVID + today. Pt restarted eliquis 2 days ago, and follows up with Dr. Goldsmith for cardiology while outpatient. Pt endorses fever, lethargy diminished sensation on hands and feet 2/2 neuropathy, loss of balance, cough, constipation (last BM yesterday, but before that 1 week prior). Denies CP, palpitations, n/v/d, abdominal pain, leg pain. as per wife pt had some friends who visited pt & pt also has 2 HHA .    In the ED:  T 101.2 F Oral  /90 RR 18 SpO2 98% on RA  WBC 11.44 INR 1.3 Glucose 154 Procal 0.11 COVID +  CT Head Non-Con: Mild to moderate chronic microvascular changes. 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   CXR: Lung Clear.  ECG: AFib RVR  2L NS Bolus x1, 1g IV Tylenol, 1g Rocephin (14 Jun 2022 13:13)      ---  HOSPITAL COURSE:   Pt presented with fever and positive COVID w/ Afib w/ RVR. Patient was given lopressor 25 mg q6h and cardizem 90 q6 w/ HR well controlled while monitored on telemetry. TTE showed mild L ventricular hypertrophy w/ LVEF 60% and LAE. Cardio was consulted and pt had soft BP's w/ SBP in 90's. Pt home anti-HTN regimen (losartan, hydralazine and lopressor) were discontinued and patient's home cardizem was decreased to cardizem 120 ER qd per cardio rec. Patient finished 3d course of remdesivir and was managed off of steroids for COVID saturating well on RA through hospital course with improvement in dyspnea and cough. CTH NC was performed in ED showing mild/mod chronic microvascular changes w/o evidence of acute transcortical infarction or hemoorhage. 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 Chronic medical problems were managed well with home regimens. PT reccomended dc home and case management worked on setting up home health aide, however, wife expressed preference to hire private home health aide. Patient showed improvement throughout hospitalization and was medically optimized by day of discharge. Patient seen and examined at bedside on day of discharge.     T(C): 36.5 (17 Jun 2022 12:50), Max: 36.9 (17 Jun 2022 06:31)  T(F): 97.7 (17 Jun 2022 12:50), Max: 98.4 (17 Jun 2022 06:31)  HR: 84 (17 Jun 2022 12:50) (84 - 98)  BP: 133/86 (17 Jun 2022 12:50) (120/80 - 141/99)  BP(mean): --  RR: 18 (17 Jun 2022 12:50) (18 - 18)  SpO2: 95% (17 Jun 2022 12:50) (93% - 95%)    PHYSICAL EXAM:  GENERAL:  [ x ] NAD, [  x] Well appearing, [  ] Agitated, [  ] Drowsy, [  ] Lethargy, [  ] Confused   HEAD:  [ x ] Normal, [  ] Other  EYES:  [ x ] EOMI, [  ] PERRLA, [x  ] Conjunctiva and sclera clear normal, [  ] Other, [  ] Pallor, [  ] Discharge  ENMT:  [ x ] Normal, [ x ] Moist mucous membranes, [ x ] Good dentition, [ x ] No thrush  NECK:  [ x ] Supple, [ x ] No JVD, [ x ] Normal thyroid, [  ] Lymphadenopathy, [  ] Other  CHEST/LUNG:  [ x ] Clear to auscultation bilaterally, [ x ] Breath Sounds equal B/L  [  ] Poor effort, [x  ] No rales, [  x] No rhonchi, [ x ] No wheezing  HEART:  [ x ] Regular rate, [  ] Tachycardia, [  ] Bradycardia, [ x ] Irregular, [x  ] No murmurs, No rubs, No gallops, [  ] PPM in place (Mfr:  )  ABDOMEN:  [ x ] Soft, [x  ] Nontender, [ x ] Nondistended, [ x ] No mass, [ x ] Bowel sounds present, [  ] Obese  NERVOUS SYSTEM:  [ x ] Alert & Oriented x3, [ x ] Nonfocal, [  ] Confusion, [  ] Encephalopathic, [  ] Sedated, [  ] Unable to assess, [  ] Dementia, [ x ] Other-contractures EXT & Hand B/L   EXTREMITIES:  [ x ] 2+ Peripheral Pulses, No clubbing, No cyanosis,  [  ] Edema B/L lower EXT, [  ] PVD stasis skin changes B/L lower EXT, [  ] Wound [ x ] spasticity of all 4 extremities with prominent contractures of extensors of hands b/l  LYMPH:  No lymphadenopathy noted  SKIN:  [  ] No rashes or lesions, [  ] Pressure ulcers, [  ] Ecchymosis, [  ] Skin tears, [  ] Other      ---  CONSULTANTS:   Alexander Bui    ---  TIME SPENT:  I, the attending physician, was physically present for the key portions of the evaluation and management (E/M) service provided. The total amount of time spent reviewing the hospital notes, laboratory values, imaging findings, assessing/counseling the patient, discussing with consultant physicians, social work, nursing staff was -- minutes    ---  Primary care provider was made aware of plan for discharge:      [  ] NO     [  ] YES   HPI:  Pt is a 72 y.o. M with a PMH of HTN, AFIB, BPH, hx of TBI, HLD, CAD, peripheral neuropathy, found to be COVID+ who presented to the ED with CC of fatigue. Pt is not the best historian after having TBI 2/2 fall on AC, so history taken via phone from wife. Pt reports that this morning he woke up "feeling lousy" with a fever and a cough. Pt tested COVID + today. Pt restarted eliquis 2 days ago, and follows up with Dr. Goldsmith for cardiology while outpatient. Pt endorses fever, lethargy diminished sensation on hands and feet 2/2 neuropathy, loss of balance, cough, constipation (last BM yesterday, but before that 1 week prior). Denies CP, palpitations, n/v/d, abdominal pain, leg pain. as per wife pt had some friends who visited pt & pt also has 2 HHA .    In the ED:  T 101.2 F Oral  /90 RR 18 SpO2 98% on RA  WBC 11.44 INR 1.3 Glucose 154 Procal 0.11 COVID +  CT Head Non-Con: Mild to moderate chronic microvascular changes. 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   CXR: Lung Clear.  ECG: AFib RVR  2L NS Bolus x1, 1g IV Tylenol, 1g Rocephin (14 Jun 2022 13:13)      ---  HOSPITAL COURSE:   Pt presented with fever and positive COVID w/ Afib w/ RVR. Patient was given lopressor 25 mg q6h and cardizem 90 q6 w/ HR well controlled while monitored on telemetry. TTE showed mild L ventricular hypertrophy w/ LVEF 60% and LAE. Cardio was consulted and pt had soft BP's w/ SBP in 90's. Pt home anti-HTN regimen (losartan, hydralazine and lopressor) were discontinued and patient's home cardizem was decreased to cardizem 120 ER qd per cardio rec. Patient finished 3d course of remdesivir and was managed off of steroids for COVID saturating well on RA through hospital course with improvement in dyspnea and cough. CTH NC was performed in ED showing mild/mod chronic microvascular changes w/o evidence of acute transcortical infarction or hemoorhage. 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 Chronic medical problems were managed well with home regimens. PT reccomended dc home and case management worked on setting up home health aide, however, wife expressed preference to hire private home health aide until regular home health aide returns. Patient showed improvement throughout hospitalization and was medically optimized by day of discharge. Patient seen and examined at bedside on day of discharge.     T(C): 36.5 (17 Jun 2022 12:50), Max: 36.9 (17 Jun 2022 06:31)  T(F): 97.7 (17 Jun 2022 12:50), Max: 98.4 (17 Jun 2022 06:31)  HR: 84 (17 Jun 2022 12:50) (84 - 98)  BP: 133/86 (17 Jun 2022 12:50) (120/80 - 141/99)  BP(mean): --  RR: 18 (17 Jun 2022 12:50) (18 - 18)  SpO2: 95% (17 Jun 2022 12:50) (93% - 95%)    PHYSICAL EXAM:  GENERAL:  [ x ] NAD, [  x] Well appearing, [  ] Agitated, [  ] Drowsy, [  ] Lethargy, [  ] Confused   HEAD:  [ x ] Normal, [  ] Other  EYES:  [ x ] EOMI, [  ] PERRLA, [x  ] Conjunctiva and sclera clear normal, [  ] Other, [  ] Pallor, [  ] Discharge  ENMT:  [ x ] Normal, [ x ] Moist mucous membranes, [ x ] Good dentition, [ x ] No thrush  NECK:  [ x ] Supple, [ x ] No JVD, [ x ] Normal thyroid, [  ] Lymphadenopathy, [  ] Other  CHEST/LUNG:  [ x ] Clear to auscultation bilaterally, [ x ] Breath Sounds equal B/L  [  ] Poor effort, [x  ] No rales, [  x] No rhonchi, [ x ] No wheezing  HEART:  [ x ] Regular rate, [  ] Tachycardia, [  ] Bradycardia, [ x ] Irregular, [x  ] No murmurs, No rubs, No gallops, [  ] PPM in place (Mfr:  )  ABDOMEN:  [ x ] Soft, [x  ] Nontender, [ x ] Nondistended, [ x ] No mass, [ x ] Bowel sounds present, [  ] Obese  NERVOUS SYSTEM:  [ x ] Alert & Oriented x3, [ x ] Nonfocal, [  ] Confusion, [  ] Encephalopathic, [  ] Sedated, [  ] Unable to assess, [  ] Dementia, [ x ] Other-contractures EXT & Hand B/L   EXTREMITIES:  [ x ] 2+ Peripheral Pulses, No clubbing, No cyanosis,  [  ] Edema B/L lower EXT, [  ] PVD stasis skin changes B/L lower EXT, [  ] Wound [ x ] spasticity of all 4 extremities with prominent contractures of extensors of hands b/l  LYMPH:  No lymphadenopathy noted  SKIN:  [  ] No rashes or lesions, [  ] Pressure ulcers, [  ] Ecchymosis, [  ] Skin tears, [  ] Other      ---  CONSULTANTS:   Alexander MARIE - Nigel  Pulm - [place]    ---  TIME SPENT:  I, the attending physician, was physically present for the key portions of the evaluation and management (E/M) service provided. The total amount of time spent reviewing the hospital notes, laboratory values, imaging findings, assessing/counseling the patient, discussing with consultant physicians, social work, nursing staff was -- minutes    ---  Primary care provider was made aware of plan for discharge:      [  ] NO     [  ] YES   HPI:  Pt is a 72 y.o. M with a PMH of HTN, AFIB, BPH, hx of TBI, HLD, CAD, peripheral neuropathy, found to be COVID+ who presented to the ED with CC of fatigue. Pt is not the best historian after having TBI 2/2 fall on AC, so history taken via phone from wife. Pt reports that this morning he woke up "feeling lousy" with a fever and a cough. Pt tested COVID + today. Pt restarted eliquis 2 days ago, and follows up with Dr. Goldsmith for cardiology while outpatient. Pt endorses fever, lethargy diminished sensation on hands and feet 2/2 neuropathy, loss of balance, cough, constipation (last BM yesterday, but before that 1 week prior). Denies CP, palpitations, n/v/d, abdominal pain, leg pain. as per wife pt had some friends who visited pt & pt also has 2 HHA .    In the ED:  T 101.2 F Oral  /90 RR 18 SpO2 98% on RA  WBC 11.44 INR 1.3 Glucose 154 Procal 0.11 COVID +  CT Head Non-Con: Mild to moderate chronic microvascular changes. 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   CXR: Lung Clear.  ECG: AFib RVR  2L NS Bolus x1, 1g IV Tylenol, 1g Rocephin (14 Jun 2022 13:13)      ---  HOSPITAL COURSE:   Pt presented with fever and positive COVID w/ Afib w/ RVR. Patient was given lopressor 25 mg q6h and cardizem 90 q6 w/ HR well controlled while monitored on telemetry. TTE showed mild L ventricular hypertrophy w/ LVEF 60% and LAE. Cardio was consulted and pt had soft BP's w/ SBP in 90's. Pt home anti-HTN regimen (lhydralazine and lopressor) were discontinued, patient's home cardizem was decreased to cardizem 120 ER qd, and losartan decreased to 25 mg qD per cardio rec. Patient finished 3d course of remdesivir and was managed off of steroids for COVID saturating well on RA through hospital course with improvement in dyspnea and cough. CTH NC was performed in ED showing mild/mod chronic microvascular changes w/o evidence of acute transcortical infarction or hemorrhage. 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 Chronic medical problems were managed well with home regimens. PT recommended dc home and case management worked on setting up home health aide. Patient showed improvement throughout hospitalization and was medically optimized by day of discharge. Patient seen and examined at bedside on day of discharge.     T(C): 36.5 (17 Jun 2022 12:50), Max: 36.9 (17 Jun 2022 06:31)  T(F): 97.7 (17 Jun 2022 12:50), Max: 98.4 (17 Jun 2022 06:31)  HR: 84 (17 Jun 2022 12:50) (84 - 98)  BP: 133/86 (17 Jun 2022 12:50) (120/80 - 141/99)  BP(mean): --  RR: 18 (17 Jun 2022 12:50) (18 - 18)  SpO2: 95% (17 Jun 2022 12:50) (93% - 95%)    PHYSICAL EXAM:  GENERAL:  [ x ] NAD, [  x] Well appearing, [  ] Agitated, [  ] Drowsy, [  ] Lethargy, [  ] Confused   HEAD:  [ x ] Normal, [  ] Other  EYES:  [ x ] EOMI, [  ] PERRLA, [x  ] Conjunctiva and sclera clear normal, [  ] Other, [  ] Pallor, [  ] Discharge  ENMT:  [ x ] Normal, [ x ] Moist mucous membranes, [ x ] Good dentition, [ x ] No thrush  NECK:  [ x ] Supple, [ x ] No JVD, [ x ] Normal thyroid, [  ] Lymphadenopathy, [  ] Other  CHEST/LUNG:  [ x ] Clear to auscultation bilaterally, [ x ] Breath Sounds equal B/L  [  ] Poor effort, [x  ] No rales, [  x] No rhonchi, [ x ] No wheezing  HEART:  [ x ] Regular rate, [  ] Tachycardia, [  ] Bradycardia, [ x ] Irregular, [x  ] No murmurs, No rubs, No gallops, [  ] PPM in place (Mfr:  )  ABDOMEN:  [ x ] Soft, [x  ] Nontender, [ x ] Nondistended, [ x ] No mass, [ x ] Bowel sounds present, [  ] Obese  NERVOUS SYSTEM:  [ x ] Alert & Oriented x3, [ x ] Nonfocal, [  ] Confusion, [  ] Encephalopathic, [  ] Sedated, [  ] Unable to assess, [  ] Dementia, [ x ] Other-contractures EXT & Hand B/L   EXTREMITIES:  [ x ] 2+ Peripheral Pulses, No clubbing, No cyanosis,  [  ] Edema B/L lower EXT, [  ] PVD stasis skin changes B/L lower EXT, [  ] Wound [ x ] spasticity of all 4 extremities with prominent contractures of extensors of hands b/l  LYMPH:  No lymphadenopathy noted  SKIN:  [  ] No rashes or lesions, [  ] Pressure ulcers, [  ] Ecchymosis, [  ] Skin tears, [  ] Other      ---  CONSULTANTS:   Alexander Bui    ---  TIME SPENT:  I, the attending physician, was physically present for the key portions of the evaluation and management (E/M) service provided. The total amount of time spent reviewing the hospital notes, laboratory values, imaging findings, assessing/counseling the patient, discussing with consultant physicians, social work, nursing staff was -- minutes    ---  Primary care provider was made aware of plan for discharge:      [  ] NO     [  ] YES   HPI:  Pt is a 72 y.o. M with a PMH of HTN, AFIB, BPH, hx of TBI, HLD, CAD, peripheral neuropathy, found to be COVID+ who presented to the ED with CC of fatigue. Pt is not the best historian after having TBI 2/2 fall on AC, so history taken via phone from wife. Pt reports that this morning he woke up "feeling lousy" with a fever and a cough. Pt tested COVID + today. Pt restarted eliquis 2 days ago, and follows up with Dr. Goldsmith for cardiology while outpatient. Pt endorses fever, lethargy diminished sensation on hands and feet 2/2 neuropathy, loss of balance, cough, constipation (last BM yesterday, but before that 1 week prior). Denies CP, palpitations, n/v/d, abdominal pain, leg pain. as per wife pt had some friends who visited pt & pt also has 2 HHA .    In the ED:  T 101.2 F Oral  /90 RR 18 SpO2 98% on RA  WBC 11.44 INR 1.3 Glucose 154 Procal 0.11 COVID +  CT Head Non-Con: Mild to moderate chronic microvascular changes. 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   CXR: Lung Clear.  ECG: AFib RVR  2L NS Bolus x1, 1g IV Tylenol, 1g Rocephin (14 Jun 2022 13:13)      ---  HOSPITAL COURSE:   Pt presented with fever and positive COVID w/ Afib w/ RVR. Patient was given lopressor 25 mg q6h and cardizem 90 q6 w/ HR well controlled while monitored on telemetry. TTE showed mild L ventricular hypertrophy w/ LVEF 60% and LAE. Cardio was consulted and pt had soft BP's w/ SBP in 90's. Pt home anti-HTN regimen (lhydralazine and lopressor) were discontinued, patient's home cardizem was decreased to cardizem 120 ER qd, and losartan decreased to 25 mg qD per cardio rec. Patient finished 3d course of remdesivir and was managed off of steroids for COVID saturating well on RA through hospital course with improvement in dyspnea and cough. CTH NC was performed in ED showing mild/mod chronic microvascular changes w/o evidence of acute transcortical infarction or hemorrhage. 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 Chronic medical problems were managed well with home regimens. PT recommended dc home and case management worked on setting up home health aide. Patient showed improvement throughout hospitalization and was medically optimized by day of discharge. Patient seen and examined at bedside on day of discharge, medically stable for dc home with home health aids, home PT.     Vital Signs Last 24 Hrs  T(C): 36.4 (23 Jun 2022 05:49), Max: 36.4 (23 Jun 2022 05:49)  T(F): 97.6 (23 Jun 2022 05:49), Max: 97.6 (23 Jun 2022 05:49)  HR: 85 (23 Jun 2022 05:49) (85 - 86)  BP: 128/85 (23 Jun 2022 05:49) (128/75 - 128/85)  BP(mean): --  RR: 17 (23 Jun 2022 05:49) (17 - 18)  SpO2: 94% (23 Jun 2022 05:49) (94% - 96%)    PHYSICAL EXAM:  GENERAL:  [ x ] NAD, [  x] Well appearing, [  ] Agitated, [  ] Drowsy, [  ] Lethargy, [  ] Confused   HEAD:  [ x ] Normal, [  ] Other  EYES:  [ x ] EOMI, [  ] PERRLA, [x  ] Conjunctiva and sclera clear normal, [  ] Other, [  ] Pallor, [  ] Discharge  ENMT:  [ x ] Normal, [ x ] Moist mucous membranes, [ x ] Good dentition, [ x ] No thrush  NECK:  [ x ] Supple, [ x ] No JVD, [ x ] Normal thyroid, [  ] Lymphadenopathy, [  ] Other  CHEST/LUNG:  [ x ] Clear to auscultation bilaterally, [ x ] Breath Sounds equal B/L  [  ] Poor effort, [x  ] No rales, [  x] No rhonchi, [ x ] No wheezing  HEART:  [ x ] Regular rate, [  ] Tachycardia, [  ] Bradycardia, [ x ] Irregular, [x  ] No murmurs, No rubs, No gallops, [  ] PPM in place (Mfr:  )  ABDOMEN:  [ x ] Soft, [x  ] Nontender, [ x ] Nondistended, [ x ] No mass, [ x ] Bowel sounds present, [  ] Obese  NERVOUS SYSTEM:  [ x ] Alert & Oriented x3, [ x ] Nonfocal, [  ] Confusion, [  ] Encephalopathic, [  ] Sedated, [  ] Unable to assess, [  ] Dementia, [ x ] Other-contractures EXT & Hand B/L   EXTREMITIES:  [ x ] 2+ Peripheral Pulses, No clubbing, No cyanosis,  [  ] Edema B/L lower EXT, [  ] PVD stasis skin changes B/L lower EXT, [  ] Wound [ x ] spasticity of all 4 extremities with prominent contractures of extensors of hands b/l  LYMPH:  No lymphadenopathy noted  SKIN:  [  ] No rashes or lesions, [  ] Pressure ulcers, [  ] Ecchymosis, [  ] Skin tears, [  ] Other      ---  CONSULTANTS:   Alexander Bui    ---  TIME SPENT:  I, the attending physician, was physically present for the key portions of the evaluation and management (E/M) service provided. The total amount of time spent reviewing the hospital notes, laboratory values, imaging findings, assessing/counseling the patient, discussing with consultant physicians, social work, nursing staff was -- minutes    ---  Primary care provider was made aware of plan for discharge:      [  ] NO     [  ] YES

## 2022-06-17 NOTE — DISCHARGE NOTE PROVIDER - PROVIDER TOKENS
PROVIDER:[TOKEN:[[id number]],FOLLOWUP:[1 week]],FREE:[LAST:[Smollow],FIRST:[Richard],PHONE:[(   )    -],FAX:[(   )    -]]

## 2022-06-17 NOTE — CONSULT NOTE ADULT - SUBJECTIVE AND OBJECTIVE BOX
[facility]         Janak Edwards, Malcom, Alexey, Coco, Agus, Mick        [phone] (office)    Reason for Consult:    Interval HPI: Patient seen and examined at bedside. Denies any chest pain, palpitations or SOB. States he was feeling weak and decided to come to the hospital. Home Eliquis was restarted 2 days ago by Cardio Dr. Goldsmith.     HPI:  Pt is a 72 y.o. M with a PMH of HTN, AFIB, BPH, hx of TBI, HLD, CAD, peripheral neuropathy, found to be COVID+ who presented to the ED with CC of fatigue. Pt is not the best historian after having TBI 2/2 fall on AC, so history taken via phone from wife. Pt reports that this morning he woke up "feeling lousy" with a fever and a cough. Pt tested COVID + today. Pt restarted eliquis 2 days ago, and follows up with Dr. Goldsmith for cardiology while outpatient. Pt endorses fever, lethargy diminished sensation on hands and feet 2/2 neuropathy, loss of balance, cough, constipation (last BM yesterday, but before that 1 week prior). Denies CP, palpitations, n/v/d, abdominal pain, leg pain.    In the ED:  T 101.2 F Oral  /90 RR 18 SpO2 98% on RA  WBC 11.44 INR 1.3 Glucose 154 Procal 0.11 COVID +  CT Head Non-Con: Mild to moderate chronic microvascular changes. 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   CXR: Lung Clear.  ECG: AFib RVR  2L NS Bolus x1, 1g IV Tylenol, 1g Rocephin (14 Jun 2022 13:13)      PAST MEDICAL & SURGICAL HISTORY:  TBI (traumatic brain injury)      HTN (hypertension)      Atrial fibrillation      Peripheral neuropathy      Major depression      HLD (hyperlipidemia)      CAD (coronary artery disease)      BPH (benign prostatic hyperplasia)      No significant past surgical history          SOCIAL HISTORY: No active tobacco, alcohol or illicit drug use    FAMILY HISTORY:  No pertinent family history in first degree relatives        Home Medications:  aspirin 81 mg oral tablet: 1 tab(s) orally once a day (14 Jun 2022 13:52)  bethanechol 10 mg oral tablet: 1 tab(s) orally 3 times a day at (8am, 3pm, 10pm) (14 Jun 2022 13:52)  buPROPion 75 mg oral tablet: 1 tab(s) orally 2 times a day (10am, 10pm) (14 Jun 2022 13:52)  Eliquis 2.5 mg oral tablet: 1 tab(s) orally 2 times a day (10am, 10pm)t (14 Jun 2022 13:52)  eplerenone 25 mg oral tablet: 1 tab(s) orally once a day (14 Jun 2022 13:52)  gabapentin 300 mg oral capsule: 1 cap(s) orally 3 times a day (8am, 3pm, 10pm (14 Jun 2022 13:52)  hydrALAZINE 50 mg oral tablet: 1 tab(s) orally 2 times a day (14 Jun 2022 13:52)  latanoprost 0.005% ophthalmic solution: 1 drop(s) to each affected eye once a day (in the evening) (14 Jun 2022 13:52)  losartan 100 mg oral tablet: 1 tab(s) orally once a day (14 Jun 2022 13:52)  methylphenidate 10 mg oral tablet: 2 tab(s) orally once a day (in the morning) (14 Jun 2022 13:52)  rosuvastatin 10 mg oral tablet: 1 tab(s) orally once a day (14 Jun 2022 13:52)  tamsulosin 0.4 mg oral capsule: 1 cap(s) orally once a day (in the evening) (14 Jun 2022 13:52)  Tiadylt  mg/24 hours oral capsule, extended release: 1 cap(s) orally once a day (14 Jun 2022 13:52)  tiZANidine 4 mg oral tablet: 1 tab(s) orally 3 times a day (14 Jun 2022 13:52)  Vitamin D3 25 mcg (1000 intl units) oral tablet: 1 tab(s) orally once a day (14 Jun 2022 13:52)      MEDICATIONS  (STANDING):  apixaban 2.5 milliGRAM(s) Oral two times a day  aspirin  chewable 81 milliGRAM(s) Oral daily  atorvastatin 40 milliGRAM(s) Oral at bedtime  bethanechol 10 milliGRAM(s) Oral three times a day  buPROPion XL (24-Hour) . 150 milliGRAM(s) Oral daily  cholecalciferol 1000 Unit(s) Oral daily  diltiazem    milliGRAM(s) Oral daily  gabapentin 300 milliGRAM(s) Oral three times a day  hydrALAZINE 50 milliGRAM(s) Oral two times a day  latanoprost 0.005% Ophthalmic Solution 1 Drop(s) Both EYES at bedtime  losartan 100 milliGRAM(s) Oral daily  methylphenidate 20 milliGRAM(s) Oral daily  polyethylene glycol 3350 17 Gram(s) Oral two times a day  senna 2 Tablet(s) Oral at bedtime  tamsulosin 0.4 milliGRAM(s) Oral at bedtime    MEDICATIONS  (PRN):  acetaminophen     Tablet .. 650 milliGRAM(s) Oral every 6 hours PRN Temp greater or equal to 38C (100.4F), Mild Pain (1 - 3)  aluminum hydroxide/magnesium hydroxide/simethicone Suspension 30 milliLiter(s) Oral every 4 hours PRN Dyspepsia  guaiFENesin Oral Liquid (Sugar-Free) 200 milliGRAM(s) Oral every 6 hours PRN Cough  melatonin 3 milliGRAM(s) Oral at bedtime PRN Insomnia  ondansetron Injectable 4 milliGRAM(s) IV Push every 8 hours PRN Nausea and/or Vomiting      Allergies    No Known Allergies    Intolerances        REVIEW OF SYSTEMS: Negative except as per HPI.    VITAL SIGNS:   Vital Signs Last 24 Hrs  T(C): 37.3 (14 Jun 2022 12:30), Max: 38.4 (14 Jun 2022 09:53)  T(F): 99.2 (14 Jun 2022 12:30), Max: 101.2 (14 Jun 2022 09:53)  HR: 113 (14 Jun 2022 12:30) (113 - 120)  BP: 128/78 (14 Jun 2022 12:30) (128/78 - 150/90)  BP(mean): --  RR: 18 (14 Jun 2022 12:30) (18 - 18)  SpO2: 96% (14 Jun 2022 12:30) (96% - 98%)    I&O's Summary      PHYSICAL EXAM:  Constitutional: NAD, well-developed  HEENT NC/AT, moist mucous membranes  Pulmonary: Non-labored, breath sounds are clear bilaterally, no wheezing, rales or rhonchi  Cardiovascular: +S1, S2, irregularly irregular, no murmur  Gastrointestinal: Soft, nontender, nondistended, normoactive bowel sounds  Extremities: No peripheral edema   Neurological: Alert, strength and sensitivity are grossly intact  Skin: No obvious lesions/rashes  Psych: Mood & affect appropriate    LABS: All Labs Reviewed:                        14.7   11.44 )-----------( 159      ( 14 Jun 2022 11:03 )             45.7     14 Jun 2022 11:03    140    |  106    |  18     ----------------------------<  154    4.3     |  26     |  1.20     Ca    8.8        14 Jun 2022 11:03    TPro  6.8    /  Alb  3.6    /  TBili  0.5    /  DBili  x      /  AST  11     /  ALT  22     /  AlkPhos  111    14 Jun 2022 11:03    PT/INR - ( 14 Jun 2022 11:03 )   PT: 15.3 sec;   INR: 1.30 ratio         PTT - ( 14 Jun 2022 11:03 )  PTT:37.0 sec      
Date/Time Patient Seen:  		  Referring MD:   Data Reviewed	       Patient is a 72y old  Male who presents with a chief complaint of     Subjective/HPI  in bed  seen and examined  vs noted  labs reviewed  er provider note reviewed  H and P reviewed  cxr noted  ct head noted  alert  verbal  covid positive      History of Present Illness:  History of Present Illness:   Pt is a 72 y.o. M with a PMH of HTN, AFIB, BPH, hx of TBI, HLD, CAD, peripheral neuropathy, found to be COVID+ who presented to the ED with CC of fatigue. Pt is not the best historian after having TBI 2/2 fall on AC, so history taken via phone from wife. Pt reports that this morning he woke up "feeling lousy" with a fever and a cough. Pt tested COVID + today. Pt restarted eliquis 2 days ago, and follows up with Dr. Goldsmith for cardiology while outpatient. Pt endorses fever, lethargy diminished sensation on hands and feet 2/2 neuropathy, loss of balance, cough, constipation (last BM yesterday, but before that 1 week prior). Denies CP, palpitations, n/v/d, abdominal pain, leg pain.  FAMILY HISTORY:  No pertinent family history in first degree relatives. No pertinent family history of: No pertinent family hx.     Social History:  Social History (marital status, living situation, occupation, tobacco use, alcohol and drug use, and sexual history): Vaccinated for COVID 3/3, last shot in february with moderna. Ambulates without assistance- pt is bedbound and wheelchair bound. Pt is reliant on wife for ADLs. Denies alcohol, tobacco, or recreational drug use.     Tobacco Screening:  · Core Measure Site	Yes  · Has the patient used tobacco in the past 30 days?	No    Risk Assessment:    Present on Admission:  Deep Venous Thrombosis	no  Pulmonary Embolus	no     Heart Failure:  Does this patient have a history of or has been diagnosed with heart failure? no.       PAST MEDICAL & SURGICAL HISTORY:  TBI (traumatic brain injury)    HTN (hypertension)    Atrial fibrillation    DM (diabetes mellitus)    Peripheral neuropathy    Major depression    HLD (hyperlipidemia)    CAD (coronary artery disease)    BPH (benign prostatic hyperplasia)    No significant past surgical history          Medication list         MEDICATIONS  (STANDING):  apixaban 2.5 milliGRAM(s) Oral two times a day  aspirin  chewable 81 milliGRAM(s) Oral daily  atorvastatin 40 milliGRAM(s) Oral at bedtime  bethanechol 10 milliGRAM(s) Oral three times a day  buPROPion XL (24-Hour) . 150 milliGRAM(s) Oral daily  cholecalciferol 1000 Unit(s) Oral daily  diltiazem    milliGRAM(s) Oral daily  gabapentin 300 milliGRAM(s) Oral three times a day  hydrALAZINE 50 milliGRAM(s) Oral two times a day  latanoprost 0.005% Ophthalmic Solution 1 Drop(s) Both EYES at bedtime  losartan 100 milliGRAM(s) Oral daily  methylphenidate 20 milliGRAM(s) Oral daily  polyethylene glycol 3350 17 Gram(s) Oral two times a day  senna 2 Tablet(s) Oral at bedtime  tamsulosin 0.4 milliGRAM(s) Oral at bedtime    MEDICATIONS  (PRN):  acetaminophen     Tablet .. 650 milliGRAM(s) Oral every 6 hours PRN Temp greater or equal to 38C (100.4F), Mild Pain (1 - 3)  aluminum hydroxide/magnesium hydroxide/simethicone Suspension 30 milliLiter(s) Oral every 4 hours PRN Dyspepsia  melatonin 3 milliGRAM(s) Oral at bedtime PRN Insomnia  ondansetron Injectable 4 milliGRAM(s) IV Push every 8 hours PRN Nausea and/or Vomiting         Vitals log        ICU Vital Signs Last 24 Hrs  T(C): 37.3 (14 Jun 2022 12:30), Max: 38.4 (14 Jun 2022 09:53)  T(F): 99.2 (14 Jun 2022 12:30), Max: 101.2 (14 Jun 2022 09:53)  HR: 113 (14 Jun 2022 12:30) (113 - 120)  BP: 128/78 (14 Jun 2022 12:30) (128/78 - 150/90)  BP(mean): --  ABP: --  ABP(mean): --  RR: 18 (14 Jun 2022 12:30) (18 - 18)  SpO2: 96% (14 Jun 2022 12:30) (96% - 98%)           Input and Output:  I&O's Detail      Lab Data                        14.7   11.44 )-----------( 159      ( 14 Jun 2022 11:03 )             45.7     06-14    140  |  106  |  18  ----------------------------<  154<H>  4.3   |  26  |  1.20    Ca    8.8      14 Jun 2022 11:03    TPro  6.8  /  Alb  3.6  /  TBili  0.5  /  DBili  x   /  AST  11<L>  /  ALT  22  /  AlkPhos  111  06-14            Review of Systems	  weakness  cough    Objective     Physical Examination    on RA  heart s1s2  lung dec BS  abd soft  head nc  verbal  alert  bedbound      Pertinent Lab findings & Imaging      Geraldo:  NO   Adequate UO     I&O's Detail           Discussed with:     Cultures:	        Radiology    cxr  NAPD  CT head noted                          
HPI    HPI:  Pt is a 72 y.o. M with a PMH of HTN, AFIB, BPH, hx of TBI, HLD, CAD, peripheral neuropathy, found to be COVID+ who presented to the ED with CC of fatigue. Pt is not the best historian after having TBI 2/2 fall on AC, so history taken via phone from wife. Pt reports that this morning he woke up "feeling lousy" with a fever and a cough. Pt tested COVID + today. Pt restarted eliquis 2 days ago, and follows up with Dr. Goldsmith for cardiology while outpatient. Pt endorses fever, lethargy diminished sensation on hands and feet 2/2 neuropathy, loss of balance, cough, constipation (last BM yesterday, but before that 1 week prior). Denies CP, palpitations, n/v/d, abdominal pain, leg pain. as per wife pt had some friends who visited pt & pt also has 2 HHA .        PAST MEDICAL & SURGICAL HISTORY:  TBI (traumatic brain injury)      HTN (hypertension)      Atrial fibrillation      Peripheral neuropathy      Major depression      HLD (hyperlipidemia)      CAD (coronary artery disease)      BPH (benign prostatic hyperplasia)      No significant past surgical history    REVIEW OF SYSTEMS  Patient is unable to provide any information/ROS  due to baseline mental status      MEDICATIONS  (STANDING):  apixaban 2.5 milliGRAM(s) Oral two times a day  aspirin  chewable 81 milliGRAM(s) Oral daily  atorvastatin 40 milliGRAM(s) Oral at bedtime  bethanechol 10 milliGRAM(s) Oral three times a day  buPROPion XL (24-Hour) . 150 milliGRAM(s) Oral daily  cholecalciferol 1000 Unit(s) Oral daily  diltiazem    milliGRAM(s) Oral daily  gabapentin 300 milliGRAM(s) Oral three times a day  latanoprost 0.005% Ophthalmic Solution 1 Drop(s) Both EYES at bedtime  losartan 25 milliGRAM(s) Oral daily  methylphenidate 20 milliGRAM(s) Oral daily  polyethylene glycol 3350 17 Gram(s) Oral two times a day  senna 2 Tablet(s) Oral at bedtime  tamsulosin 0.4 milliGRAM(s) Oral at bedtime  tiZANidine 4 milliGRAM(s) Oral three times a day    MEDICATIONS  (PRN):  acetaminophen     Tablet .. 650 milliGRAM(s) Oral every 6 hours PRN Temp greater or equal to 38C (100.4F), Mild Pain (1 - 3)  aluminum hydroxide/magnesium hydroxide/simethicone Suspension 30 milliLiter(s) Oral every 4 hours PRN Dyspepsia  guaiFENesin Oral Liquid (Sugar-Free) 200 milliGRAM(s) Oral every 6 hours PRN Cough  melatonin 3 milliGRAM(s) Oral at bedtime PRN Insomnia  ondansetron Injectable 4 milliGRAM(s) IV Push every 8 hours PRN Nausea and/or Vomiting      Allergies    No Known Allergies    Intolerances        SOCIAL HISTORY:      FAMILY HISTORY:  No pertinent family history in first degree relatives        Vital Signs Last 24 Hrs  T(C): 36.5 (17 Jun 2022 12:50), Max: 36.9 (17 Jun 2022 06:31)  T(F): 97.7 (17 Jun 2022 12:50), Max: 98.4 (17 Jun 2022 06:31)  HR: 84 (17 Jun 2022 12:50) (84 - 98)  BP: 133/86 (17 Jun 2022 12:50) (120/80 - 141/99)  BP(mean): --  RR: 18 (17 Jun 2022 12:50) (18 - 18)  SpO2: 95% (17 Jun 2022 12:50) (93% - 95%)    NAD / gaurded but stable,  A&Ox3/ Alert  cachectic/ MO/ Obese  within defined normal limits  Total Care Sport/ Versa Care P500 bed                            12.7   6.94  )-----------( 171      ( 17 Jun 2022 07:55 )             38.4     06-17    143  |  110<H>  |  23  ----------------------------<  103<H>  3.8   |  26  |  0.87    Ca    8.4<L>      17 Jun 2022 07:55    TPro  5.5<L>  /  Alb  2.8<L>  /  TBili  0.3  /  DBili  0.1  /  AST  12<L>  /  ALT  18  /  AlkPhos  77  06-17    Auto Neutrophil #: 4.66 K/uL (06-17-22 @ 07:55)    A1C with Estimated Average Glucose Result: 6.9 % (06-15-22 @ 10:52)      PHYSICAL EXAM:    General: resting comfortably in bed; NAD  Extremities: no clubbing or cyanosis, no gross deformities,   Dermatologic: skin warm, dry and intact; hyperkeratotic skin  Neurologic: weakened strength & sensation grossly intact  Musculoskeletal/Vascular: no deformities/ contractures              
[facility] DIVISION of  INFECTIOUS DISEASE  Ovidio Manning MD PhD, Jojo Boggs MD, Hafsa Bhardwaj MD, Danica Goldsmith MD, Samy Terry MD  and providing coverage with Meet Perea MD  Providing Infectious Disease Consultations at [facility], [facility], [facility], [facility]'s    Office# [phone] to schedule follow up appointments  Answering Service for urgent calls or New Consults [phone]  Cell# to text for urgent issues Ovidio Manning [phone]     HPI:  Pt is a 72 y.o. M with a PMH of HTN, AFIB, BPH, hx of TBI, HLD, CAD, peripheral neuropathy, found to be COVID+ who presented to the ED with CC of fatigue. Pt is not the best historian after having TBI 2/2 fall on AC, so history taken via phone from wife. Pt reports that this morning he woke up "feeling lousy" with a fever and a cough. Pt tested COVID + today. Pt restarted eliquis 2 days ago, and follows up with Dr. Goldsmith for cardiology while outpatient. Pt endorses fever, lethargy diminished sensation on hands and feet 2/2 neuropathy, loss of balance, cough, constipation (last BM yesterday, but before that 1 week prior). Denies CP, palpitations, n/v/d, abdominal pain, leg pain. as per wife pt had some friends who visited pt & pt also has 2 HHA .    In the ED:  T 101.2 F Oral  /90 RR 18 SpO2 98% on RA    PAST MEDICAL & SURGICAL HISTORY:  TBI (traumatic brain injury)  HTN (hypertension)  Atrial fibrillation  Peripheral neuropathy  Major depression  HLD (hyperlipidemia)  CAD (coronary artery disease)  BPH (benign prostatic hyperplasia)    No significant past surgical history          Antimicrobials  remdesivir  IVPB   IV Intermittent   remdesivir  IVPB 100 milliGRAM(s) IV Intermittent every 24 hours      Immunological      Other  acetaminophen     Tablet .. 650 milliGRAM(s) Oral every 6 hours PRN  aluminum hydroxide/magnesium hydroxide/simethicone Suspension 30 milliLiter(s) Oral every 4 hours PRN  apixaban 2.5 milliGRAM(s) Oral two times a day  aspirin  chewable 81 milliGRAM(s) Oral daily  atorvastatin 40 milliGRAM(s) Oral at bedtime  bethanechol 10 milliGRAM(s) Oral three times a day  buPROPion XL (24-Hour) . 150 milliGRAM(s) Oral daily  cholecalciferol 1000 Unit(s) Oral daily  diltiazem    Tablet 90 milliGRAM(s) Oral every 6 hours  gabapentin 300 milliGRAM(s) Oral three times a day  guaiFENesin Oral Liquid (Sugar-Free) 200 milliGRAM(s) Oral every 6 hours PRN  hydrALAZINE 50 milliGRAM(s) Oral two times a day  latanoprost 0.005% Ophthalmic Solution 1 Drop(s) Both EYES at bedtime  losartan 100 milliGRAM(s) Oral daily  melatonin 3 milliGRAM(s) Oral at bedtime PRN  methylphenidate 20 milliGRAM(s) Oral daily  metoprolol tartrate 25 milliGRAM(s) Oral every 6 hours  ondansetron Injectable 4 milliGRAM(s) IV Push every 8 hours PRN  polyethylene glycol 3350 17 Gram(s) Oral two times a day  senna 2 Tablet(s) Oral at bedtime  tamsulosin 0.4 milliGRAM(s) Oral at bedtime  tiZANidine 4 milliGRAM(s) Oral three times a day      Allergies    No Known Allergies    Intolerances        SOCIAL HISTORY:  Social History:  Vaccinated for COVID 3/3, last shot in february with moderna. Ambulates without assistance- pt is bedbound and wheelchair bound. Pt is reliant on wife for ADLs. Denies alcohol, tobacco, or recreational drug use. (14 Jun 2022 13:13)      FAMILY HISTORY:  No pertinent family history in first degree relatives        ROS:    limited    Vital Signs Last 24 Hrs  T(C): 36.7 (15 Juan 2022 08:07), Max: 38.1 (14 Jun 2022 17:25)  T(F): 98.1 (15 Juan 2022 08:07), Max: 100.6 (14 Jun 2022 17:25)  HR: 77 (15 Juan 2022 10:10) (77 - 110)  BP: 106/69 (15 Juan 2022 10:10) (98/52 - 173/91)  BP(mean): --  RR: 18 (15 Juan 2022 08:07) (18 - 20)  SpO2: 96% (15 Juan 2022 10:10) (93% - 98%)    PE:    HEENT:  NC, atraumatic  Lungs:  No accessory muscle use, breathing comfortably  Cor:  distant  Abd:  Symmetric, appears normal,   Extrem:  No cyanosis        LABS:                        12.9   8.66  )-----------( 156      ( 15 Juan 2022 08:04 )             40.0       WBC Count: 8.66 K/uL (06-15-22 @ 08:04)  WBC Count: 11.44 K/uL (06-14-22 @ 11:03)      06-15    141  |  108  |  19  ----------------------------<  129<H>  4.1   |  25  |  1.10    Ca    8.5      15 Juan 2022 08:04  Phos  2.9     06-15  Mg     2.2     06-15    TPro  6.0  /  Alb  3.0<L>  /  TBili  0.4  /  DBili  0.2  /  AST  12<L>  /  ALT  18  /  AlkPhos  87  06-15      Creatinine, Serum: 1.10 mg/dL (06-15-22 @ 08:04)  Creatinine, Serum: 0.99 mg/dL (06-14-22 @ 16:29)  Creatinine, Serum: 1.20 mg/dL (06-14-22 @ 11:03)              COVID RISK SCORE:  Auto Neutrophil #: 9.72 K/uL (06-14-22 @ 11:03)  Auto Lymphocyte #: 0.80 K/uL (06-14-22 @ 11:03)    Lactate, Blood: 1.3 mmol/L (06-14-22 @ 11:03)    Auto Eosinophil #: 0.11 K/uL (06-14-22 @ 11:03)      Sedimentation Rate, Erythrocyte: 2 mm/hr (06-14-22 @ 11:03)    Procalcitonin, Serum: 0.11 ng/mL (06-14-22 @ 11:03)            Ferritin, Serum: 113 ng/mL (06-15-22 @ 10:55)        INR: 1.33 ratio (06-15-22 @ 08:04)  INR: 1.26 ratio (06-14-22 @ 16:29)  Activated Partial Thromboplastin Time: 37.0 sec (06-14-22 @ 11:03)  INR: 1.30 ratio (06-14-22 @ 11:03)    D-Dimer Assay, Quantitative: 154 ng/mL DDU (06-15-22 @ 08:04)  D-Dimer Assay, Quantitative: <150 ng/mL DDU (06-14-22 @ 16:29)        MICROBIOLOGY:    SARS-CoV-2: Detected (14 Jun 2022 11:19)  COVID-19 PCR: Detected (14 Jun 2022 11:03)      RADIOLOGY & ADDITIONAL STUDIES:    --< from: Xray Chest 1 View- PORTABLE-Urgent (06.14.22 @ 11:10) >  ACC: [id number] EXAM:  XR CHEST PORTABLE URGENT 1V                          PROCEDURE DATE:  06/14/2022          INTERPRETATION:  AP semierect chest on June 14, 2020 at 11:05 AM. Patient   has sepsis.    Heart magnified by technique.    Lungs remain clear.    Chest is similar to May 22.    IMPRESSION: No acute finding or change.    < end of copied text >

## 2022-06-17 NOTE — DISCHARGE NOTE PROVIDER - CARE PROVIDER_API CALL
Mirta Goldsmith)  Internal Medicine  [address] [place] [address]  Phone: [phone]  Fax: [phone]  Follow Up Time: 1 week    Richard Fuentes  Phone: (   )    -  Fax: (   )    -  Follow Up Time:

## 2022-06-17 NOTE — PROGRESS NOTE ADULT - SUBJECTIVE AND OBJECTIVE BOX
[facility] -- Janak Edwards, Malcom, Alexey, Agus Sepulveda Savella, Goodger  Office # [phone]    Follow Up:    AFIB with RVR in setting of COVID    Subjective/Observations:   Patient seen and examined. He is doing well, eating breakfast,no complaints.      REVIEW OF SYSTEMS: All other review of systems is negative unless indicated above  PAST MEDICAL & SURGICAL HISTORY:  TBI (traumatic brain injury)  HTN (hypertension)  Atrial fibrillation  Peripheral neuropathy  Major depression  HLD (hyperlipidemia)  CAD (coronary artery disease)  BPH (benign prostatic hyperplasia)    No significant past surgical history    MEDICATIONS  (STANDING):  apixaban 2.5 milliGRAM(s) Oral two times a day  aspirin  chewable 81 milliGRAM(s) Oral daily  atorvastatin 40 milliGRAM(s) Oral at bedtime  bethanechol 10 milliGRAM(s) Oral three times a day  buPROPion XL (24-Hour) . 150 milliGRAM(s) Oral daily  cholecalciferol 1000 Unit(s) Oral daily  diltiazem    milliGRAM(s) Oral daily  gabapentin 300 milliGRAM(s) Oral three times a day  latanoprost 0.005% Ophthalmic Solution 1 Drop(s) Both EYES at bedtime  methylphenidate 20 milliGRAM(s) Oral daily  polyethylene glycol 3350 17 Gram(s) Oral two times a day  senna 2 Tablet(s) Oral at bedtime  tamsulosin 0.4 milliGRAM(s) Oral at bedtime  tiZANidine 4 milliGRAM(s) Oral three times a day    MEDICATIONS  (PRN):  acetaminophen     Tablet .. 650 milliGRAM(s) Oral every 6 hours PRN Temp greater or equal to 38C (100.4F), Mild Pain (1 - 3)  aluminum hydroxide/magnesium hydroxide/simethicone Suspension 30 milliLiter(s) Oral every 4 hours PRN Dyspepsia  guaiFENesin Oral Liquid (Sugar-Free) 200 milliGRAM(s) Oral every 6 hours PRN Cough  melatonin 3 milliGRAM(s) Oral at bedtime PRN Insomnia  ondansetron Injectable 4 milliGRAM(s) IV Push every 8 hours PRN Nausea and/or Vomiting    Allergies    No Known Allergies    Intolerances      Vital Signs Last 24 Hrs  T(C): 36.9 (2022 06:31), Max: 36.9 (2022 06:31)  T(F): 98.4 (:31), Max: 98.4 (:31)  HR: 92 (:31) (92 - 98)  BP: 141/99 (:31) (120/80 - 141/99)  BP(mean): --  RR: 18 (:31) (18 - 18)  SpO2: 93% (:31) (93% - 94%)  I&O's Summary    2022 07:01  -  2022 07:00  --------------------------------------------------------  IN: 100 mL / OUT: 1200 mL / NET: -1100 mL        PHYSICAL EXAM:  TELE: atrial fibrillaton  Constitutional: NAD, awake and alert, well-developed  HEENT: Moist Mucous Membranes, Anicteric  Pulmonary: +rhonci, bilaterally  Cardiovascular: irregular rate and rhythm  Gastrointestinal: Bowel Sounds present, soft, nontender.   Lymph: No peripheral edema. No lymphadenopathy.  Skin: No visible rashes or ulcers.  Psych:  Mood & affect appropriate  LABS: All Labs Reviewed:                        12.7   6.94  )-----------( 171      ( 2022 07:55 )             38.4                         13.1   8.47  )-----------( 158      ( 2022 07:49 )             40.1                         12.9   8.66  )-----------( 156      ( 15 Juan 2022 08:04 )             40.0     2022 07:55    143    |  110    |  23     ----------------------------<  103    3.8     |  26     |  0.87   2022 16:58    x      |  x      |  x      ----------------------------<  x      x       |  x      |  0.92   2022 07:49    140    |  107    |  20     ----------------------------<  103    3.5     |  26     |  0.99     Ca    8.4        2022 07:55  Ca    8.8        2022 07:49  Ca    8.5        15 Juan 2022 08:04  Phos  2.9       15 Juan 2022 08:04  Mg     2.2       15 Juan 2022 08:04    TPro  5.5    /  Alb  2.8    /  TBili  0.3    /  DBili  0.1    /  AST  12     /  ALT  18     /  AlkPhos  77     2022 07:55  TPro  5.4    /  Alb  2.7    /  TBili  0.2    /  DBili  <0.1   /  AST  15     /  ALT  16     /  AlkPhos  81     2022 16:58  TPro  5.7    /  Alb  3.0    /  TBili  0.3    /  DBili  0.1    /  AST  13     /  ALT  15     /  AlkPhos  81     2022 07:49    PT/INR - ( 2022 07:55 )   PT: 15.5 sec;   INR: 1.32 ratio         EC Lead ECG:   Ventricular Rate 123 BPM    QRS Duration 78 ms    Q-T Interval 298 ms    QTC Calculation(Bazett) 426 ms    R Axis 200 degrees    T Axis 5 degrees    Diagnosis Line Atrial fibrillation with rapid ventricular response  Right superior axis deviation  Right ventricular hypertrophy  Septal infarct (cited on or before 22-MAY-2022)  Abnormal ECG  When compared with ECG of 22-MAY-2022 14:51,  No significant change was found  Confirmed by TOMMY SEPULVEDA ([id number]) on 2022 12:39:22 PM (22 @ 10:15)        Radiology:

## 2022-06-17 NOTE — PROGRESS NOTE ADULT - PROBLEM SELECTOR PLAN 4
chronic, stable soft  - hold home eplerenone as per Cardiology  - hold lopressor for soft BP, rates well controlled  - hold losartan and hydralazine given soft bp  - routine hemodynamic monitoring  - Cardio Dr MARTÍNEZ wise chronic, stable soft- on CCB, Start Cardizem  mg daily   - STOP home eplerenone as per Cardiology  - STOP lopressor for soft BP, rates well controlled  - STOP losartan and hydralazine given soft bp  - routine hemodynamic monitoring  - Cardio Dr MARTÍNEZ wise- out pt  Cardiology.

## 2022-06-17 NOTE — PROGRESS NOTE ADULT - SUBJECTIVE AND OBJECTIVE BOX
Date/Time Patient Seen:  		  Referring MD:   Data Reviewed	       Patient is a 72y old  Male who presents with a chief complaint of Afib w/ RVR + COVID (16 Jun 2022 16:10)      Subjective/HPI     PAST MEDICAL & SURGICAL HISTORY:  TBI (traumatic brain injury)    HTN (hypertension)    Atrial fibrillation    DM (diabetes mellitus)    Peripheral neuropathy    Major depression    HLD (hyperlipidemia)    CAD (coronary artery disease)    BPH (benign prostatic hyperplasia)    No significant past surgical history          Medication list         MEDICATIONS  (STANDING):  apixaban 2.5 milliGRAM(s) Oral two times a day  aspirin  chewable 81 milliGRAM(s) Oral daily  atorvastatin 40 milliGRAM(s) Oral at bedtime  bethanechol 10 milliGRAM(s) Oral three times a day  buPROPion XL (24-Hour) . 150 milliGRAM(s) Oral daily  cholecalciferol 1000 Unit(s) Oral daily  diltiazem    Tablet 30 milliGRAM(s) Oral every 6 hours  gabapentin 300 milliGRAM(s) Oral three times a day  latanoprost 0.005% Ophthalmic Solution 1 Drop(s) Both EYES at bedtime  methylphenidate 20 milliGRAM(s) Oral daily  polyethylene glycol 3350 17 Gram(s) Oral two times a day  senna 2 Tablet(s) Oral at bedtime  tamsulosin 0.4 milliGRAM(s) Oral at bedtime  tiZANidine 4 milliGRAM(s) Oral three times a day    MEDICATIONS  (PRN):  acetaminophen     Tablet .. 650 milliGRAM(s) Oral every 6 hours PRN Temp greater or equal to 38C (100.4F), Mild Pain (1 - 3)  aluminum hydroxide/magnesium hydroxide/simethicone Suspension 30 milliLiter(s) Oral every 4 hours PRN Dyspepsia  guaiFENesin Oral Liquid (Sugar-Free) 200 milliGRAM(s) Oral every 6 hours PRN Cough  melatonin 3 milliGRAM(s) Oral at bedtime PRN Insomnia  ondansetron Injectable 4 milliGRAM(s) IV Push every 8 hours PRN Nausea and/or Vomiting         Vitals log        ICU Vital Signs Last 24 Hrs  T(C): 36.9 (17 Jun 2022 06:31), Max: 36.9 (16 Jun 2022 09:03)  T(F): 98.4 (17 Jun 2022 06:31), Max: 98.4 (16 Jun 2022 09:03)  HR: 92 (17 Jun 2022 06:31) (70 - 98)  BP: 141/99 (17 Jun 2022 06:31) (100/50 - 141/99)  BP(mean): --  ABP: --  ABP(mean): --  RR: 18 (17 Jun 2022 06:31) (18 - 18)  SpO2: 93% (17 Jun 2022 06:31) (93% - 96%)           Input and Output:  I&O's Detail    16 Jun 2022 07:01  -  17 Jun 2022 07:00  --------------------------------------------------------  IN:    Oral Fluid: 100 mL  Total IN: 100 mL    OUT:    Incontinent per Condom Catheter (mL): 1200 mL    Stool (mL): 0 mL  Total OUT: 1200 mL    Total NET: -1100 mL          Lab Data                        13.1   8.47  )-----------( 158      ( 16 Jun 2022 07:49 )             40.1     06-16    x   |  x   |  x   ----------------------------<  x   x    |  x   |  0.92    Ca    8.8      16 Jun 2022 07:49  Phos  2.9     06-15  Mg     2.2     06-15    TPro  5.4<L>  /  Alb  2.7<L>  /  TBili  0.2  /  DBili  <0.1  /  AST  15  /  ALT  16  /  AlkPhos  81  06-16            Review of Systems	      Objective     Physical Examination    heart s1s2  lung dec BS  abd soft      Pertinent Lab findings & Imaging      Chow:  NO   Adequate UO     I&O's Detail    16 Jun 2022 07:01  -  17 Jun 2022 07:00  --------------------------------------------------------  IN:    Oral Fluid: 100 mL  Total IN: 100 mL    OUT:    Incontinent per Condom Catheter (mL): 1200 mL    Stool (mL): 0 mL  Total OUT: 1200 mL    Total NET: -1100 mL               Discussed with:     Cultures:	        Radiology

## 2022-06-17 NOTE — CONSULT NOTE ADULT - ASSESSMENT
patient sees dermatology for skin condition hyperkaratosis he states his dermatologist will at times burn of any  raised areas  recommendation:   -lac-hydrin bid:   Patient is on a low air loss mattress  At risk for altered tissue perfusion /YES  Impaired perfusion of peripheral tissue /YES  Continue  Nutrition (as tolerated)  Continue  Offloading   Continue Pericar  Care as per medicine will follow w/ you   Findings and recommendations discussed with VERNON kirkpatrick  Thank you for this consult  Eva Fisher NP, [facility] [phone]

## 2022-06-17 NOTE — PROGRESS NOTE ADULT - PROBLEM SELECTOR PLAN 1
likely 2/2 fever 2/2 COVID - rate-controlled - improved  on CCB,BB - on AC  - hold home diltiazem 300mg/24hrs.  - c/w Cardizem 30 Q6 w/ hold parameters  -  hold lopressor for soft BP, rates well controlled  -  hold losartan and hydralazine given soft bp  - continue continuous tele monitoring  - No meaningful evidence of volume overload.  - Previous TTE shows EF 60-65% on 7/2020  - f/up TTE  - Continue ASA 81mg and Rosuvastatin 10mg   - Eplerenone on hold  cardio following -DR Blackburn d/w about BP  Meds likely 2/2 fever 2/2 COVID - rate-controlled - improved  on CCB,BB - on AC  - DC home diltiazem 300mg/24hrs.  - start Cardizem  qd w/ hold parameters  - DC lopressor for soft BP, rates well controlled  - DC losartan and hydralazine given soft bp  - continue continuous tele monitoring  - No meaningful evidence of volume overload.  - Previous TTE shows EF 60-65% on 7/2020  - TTE -> mild L ventricular hypertrophy w/ LVEF 60%  - Continue ASA 81mg and Rosuvastatin 10mg   - Eplerenone on hold  cardio following -DR Blackburn d/w about BP  Meds

## 2022-06-17 NOTE — DISCHARGE NOTE PROVIDER - NSDCFUSCHEDAPPT_GEN_ALL_CORE_FT
Mirta Goldsmith  [facility] Physician [facility]  CARDIOLOGY [address] [facility]  Scheduled Appointment: 09/13/2022

## 2022-06-18 LAB
ALBUMIN SERPL ELPH-MCNC: 3.1 G/DL — LOW (ref 3.3–5)
ALP SERPL-CCNC: 88 U/L — SIGNIFICANT CHANGE UP (ref 40–120)
ALT FLD-CCNC: 16 U/L — SIGNIFICANT CHANGE UP (ref 12–78)
ANION GAP SERPL CALC-SCNC: 7 MMOL/L — SIGNIFICANT CHANGE UP (ref 5–17)
AST SERPL-CCNC: 11 U/L — LOW (ref 15–37)
BILIRUB DIRECT SERPL-MCNC: 0.1 MG/DL — SIGNIFICANT CHANGE UP (ref 0–0.3)
BILIRUB INDIRECT FLD-MCNC: 0.2 MG/DL — SIGNIFICANT CHANGE UP (ref 0.2–1)
BILIRUB SERPL-MCNC: 0.3 MG/DL — SIGNIFICANT CHANGE UP (ref 0.2–1.2)
BUN SERPL-MCNC: 18 MG/DL — SIGNIFICANT CHANGE UP (ref 7–23)
CALCIUM SERPL-MCNC: 8.7 MG/DL — SIGNIFICANT CHANGE UP (ref 8.5–10.1)
CHLORIDE SERPL-SCNC: 112 MMOL/L — HIGH (ref 96–108)
CO2 SERPL-SCNC: 26 MMOL/L — SIGNIFICANT CHANGE UP (ref 22–31)
CREAT SERPL-MCNC: 0.9 MG/DL — SIGNIFICANT CHANGE UP (ref 0.5–1.3)
EGFR: 92 ML/MIN/1.73M2 — SIGNIFICANT CHANGE UP
GLUCOSE SERPL-MCNC: 116 MG/DL — HIGH (ref 70–99)
HCT VFR BLD CALC: 40.2 % — SIGNIFICANT CHANGE UP (ref 39–50)
HGB BLD-MCNC: 13.1 G/DL — SIGNIFICANT CHANGE UP (ref 13–17)
INR BLD: 1.29 RATIO — HIGH (ref 0.88–1.16)
MCHC RBC-ENTMCNC: 26.8 PG — LOW (ref 27–34)
MCHC RBC-ENTMCNC: 32.6 GM/DL — SIGNIFICANT CHANGE UP (ref 32–36)
MCV RBC AUTO: 82.2 FL — SIGNIFICANT CHANGE UP (ref 80–100)
NRBC # BLD: 0 /100 WBCS — SIGNIFICANT CHANGE UP (ref 0–0)
PLATELET # BLD AUTO: 182 K/UL — SIGNIFICANT CHANGE UP (ref 150–400)
POTASSIUM SERPL-MCNC: 3.9 MMOL/L — SIGNIFICANT CHANGE UP (ref 3.5–5.3)
POTASSIUM SERPL-SCNC: 3.9 MMOL/L — SIGNIFICANT CHANGE UP (ref 3.5–5.3)
PROT SERPL-MCNC: 5.9 G/DL — LOW (ref 6–8.3)
PROTHROM AB SERPL-ACNC: 15.1 SEC — HIGH (ref 10.5–13.4)
RBC # BLD: 4.89 M/UL — SIGNIFICANT CHANGE UP (ref 4.2–5.8)
RBC # FLD: 14.6 % — HIGH (ref 10.3–14.5)
SODIUM SERPL-SCNC: 145 MMOL/L — SIGNIFICANT CHANGE UP (ref 135–145)
WBC # BLD: 8.16 K/UL — SIGNIFICANT CHANGE UP (ref 3.8–10.5)
WBC # FLD AUTO: 8.16 K/UL — SIGNIFICANT CHANGE UP (ref 3.8–10.5)

## 2022-06-18 PROCEDURE — 99232 SBSQ HOSP IP/OBS MODERATE 35: CPT

## 2022-06-18 RX ORDER — FLUTICASONE PROPIONATE 50 MCG
1 SPRAY, SUSPENSION NASAL
Refills: 0 | Status: DISCONTINUED | OUTPATIENT
Start: 2022-06-18 | End: 2022-06-23

## 2022-06-18 RX ADMIN — Medication 10 MILLIGRAM(S): at 11:49

## 2022-06-18 RX ADMIN — Medication 100 MILLIGRAM(S): at 11:48

## 2022-06-18 RX ADMIN — Medication 1 APPLICATION(S): at 07:06

## 2022-06-18 RX ADMIN — SENNA PLUS 2 TABLET(S): 8.6 TABLET ORAL at 21:15

## 2022-06-18 RX ADMIN — Medication 10 MILLIGRAM(S): at 21:16

## 2022-06-18 RX ADMIN — BUPROPION HYDROCHLORIDE 150 MILLIGRAM(S): 150 TABLET, EXTENDED RELEASE ORAL at 11:49

## 2022-06-18 RX ADMIN — Medication 1 APPLICATION(S): at 17:59

## 2022-06-18 RX ADMIN — APIXABAN 2.5 MILLIGRAM(S): 2.5 TABLET, FILM COATED ORAL at 18:02

## 2022-06-18 RX ADMIN — GABAPENTIN 300 MILLIGRAM(S): 400 CAPSULE ORAL at 21:15

## 2022-06-18 RX ADMIN — Medication 100 MILLIGRAM(S): at 21:16

## 2022-06-18 RX ADMIN — LATANOPROST 1 DROP(S): 0.05 SOLUTION/ DROPS OPHTHALMIC; TOPICAL at 21:15

## 2022-06-18 RX ADMIN — TAMSULOSIN HYDROCHLORIDE 0.4 MILLIGRAM(S): 0.4 CAPSULE ORAL at 21:16

## 2022-06-18 RX ADMIN — Medication 1000 UNIT(S): at 11:49

## 2022-06-18 RX ADMIN — POLYETHYLENE GLYCOL 3350 17 GRAM(S): 17 POWDER, FOR SOLUTION ORAL at 18:03

## 2022-06-18 RX ADMIN — GABAPENTIN 300 MILLIGRAM(S): 400 CAPSULE ORAL at 11:49

## 2022-06-18 RX ADMIN — Medication 10 MILLIGRAM(S): at 07:05

## 2022-06-18 RX ADMIN — APIXABAN 2.5 MILLIGRAM(S): 2.5 TABLET, FILM COATED ORAL at 07:05

## 2022-06-18 RX ADMIN — Medication 20 MILLIGRAM(S): at 11:50

## 2022-06-18 RX ADMIN — GABAPENTIN 300 MILLIGRAM(S): 400 CAPSULE ORAL at 07:04

## 2022-06-18 RX ADMIN — TIZANIDINE 4 MILLIGRAM(S): 4 TABLET ORAL at 11:49

## 2022-06-18 RX ADMIN — POLYETHYLENE GLYCOL 3350 17 GRAM(S): 17 POWDER, FOR SOLUTION ORAL at 07:06

## 2022-06-18 RX ADMIN — Medication 81 MILLIGRAM(S): at 11:49

## 2022-06-18 RX ADMIN — LOSARTAN POTASSIUM 25 MILLIGRAM(S): 100 TABLET, FILM COATED ORAL at 07:05

## 2022-06-18 RX ADMIN — Medication 1 SPRAY(S): at 11:48

## 2022-06-18 RX ADMIN — Medication 120 MILLIGRAM(S): at 07:06

## 2022-06-18 RX ADMIN — ATORVASTATIN CALCIUM 40 MILLIGRAM(S): 80 TABLET, FILM COATED ORAL at 21:15

## 2022-06-18 RX ADMIN — TIZANIDINE 4 MILLIGRAM(S): 4 TABLET ORAL at 07:05

## 2022-06-18 RX ADMIN — TIZANIDINE 4 MILLIGRAM(S): 4 TABLET ORAL at 21:16

## 2022-06-18 RX ADMIN — Medication 1 ENEMA: at 11:48

## 2022-06-18 NOTE — PROGRESS NOTE ADULT - PROBLEM SELECTOR PLAN 4
chronic, stable soft- on CCB, Start Cardizem  mg daily   - STOP home eplerenone as per Cardiology  - STOP lopressor for soft BP, rates well controlled  - STOP losartan and hydralazine given soft bp  - routine hemodynamic monitoring  - Cardio Dr MARTÍNEZ wise- out pt  Cardiology. chronic, stable soft- on CCB, Start Cardizem  mg daily   -Losartan 25 mg 1 tab daily.  - STOP home eplerenone as per Cardiology.  - STOP lopressor for soft BP, rates well controlled  - STOP hydralazine given soft bp  - Cardio  out pt Cardiology follow up as out DR MARTÍNEZ Goldsmith...

## 2022-06-18 NOTE — PROGRESS NOTE ADULT - PROBLEM SELECTOR PLAN 1
likely 2/2 fever 2/2 COVID - rate-controlled - improved  on CCB,BB - on AC  - DC home diltiazem 300mg/24hrs.  - continue Cardizem  qd w/ hold parameters  - DC lopressor for soft BP, rates well controlled  - DC losartan and hydralazine given soft bp  - continue continuous tele monitoring  - No meaningful evidence of volume overload.  - Previous TTE shows EF 60-65% on 7/2020  - TTE -> mild L ventricular hypertrophy w/ LVEF 60%  - Continue ASA 81mg and Rosuvastatin 10mg   - Eplerenone on hold  cardio following -DR Blackburn d/w about BP  Meds  -plan to dc Monday, long term care agency  rep Elida placed order to reinstate home health aids for Monday. Family had difficulty getting private hire. likely 2/2 fever 2/2 COVID - rate-controlled - improved  on CCB,BB - on AC  - D/C home diltiazem 300mg/24hrs.  - Restart Cardizem  qd w/ hold parameters  -Losartan 25 mg 1 tab daily.  - DC lopressor for soft BP, rates well controlled  - DC hydralazine given soft bp  - continue continuous tele monitoring  - No meaningful evidence of volume overload.  - Previous TTE shows EF 60-65% on 7/2020  - TTE -> mild L ventricular hypertrophy w/ LVEF 60%  - Continue ASA 81mg and Rosuvastatin 10mg   - Eplerenone on hold  cardio following -DR Edwards /DR MARTÍNEZ Goldsmith d/w about BP  Meds  -plan to dc Monday, long term care agency  rep Elida placed order to reinstate home health aids for Monday. Family had difficulty getting private hire.

## 2022-06-18 NOTE — PROGRESS NOTE ADULT - ATTENDING COMMENTS
Pt is a 72 y.o. M with a PMH of HTN, AFIB, BPH, hx of TBI, HLD, CAD, peripheral neuropathy, found to be COVID+ who is being admitted for management of AFib with RVR., COVID 19 + infection.  Pt seen, examined, case & care plan d/w pt, residents at detail.  D/W Wife at  detail, on Phone today.  Cardiology- follow up DR Edwards group- Restart Losartan 25 mg daily  ID DR Manning -Remdesivir 3 dose completed as per ID  Pulmonary-DR Bui -supportive care, stable for d/c  Aspiration precautions   COVID -Isolation  AM labs   PO diet.   -Pt is stable for D/C Home   -D/W case management , Pt's HHA  not available.   D/W wife at detail at bed side.   Total care time is 40 minutes .

## 2022-06-18 NOTE — PROGRESS NOTE ADULT - PROBLEM SELECTOR PLAN 3
chronic w/ severe persisting peripheral neuropathy 2/2 prior traumatic brain bleed on eliquis  - CT Head Non-Con ->Mild to moderate chronic microvascular changes. 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   - Fall, aspiration, safety precautions  - mostly bed bound w/ muscle contractures  - c/w gabapentin 300 mg TID, buproprion 150 mg qd, tizanidine 4 mg TID, and ritalin qd  PT -> home chronic w/ severe persisting peripheral neuropathy 2/2 prior traumatic brain bleed on Eliquis  - CT Head Non-Con ->Mild to moderate chronic microvascular changes. 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   - Fall, aspiration, safety precautions  - mostly bed bound w/ muscle contractures  - c/w gabapentin 300 mg TID, buproprion 150 mg qd, tizanidine 4 mg TID, and ritalin qd  PT -> home

## 2022-06-18 NOTE — PROGRESS NOTE ADULT - SUBJECTIVE AND OBJECTIVE BOX
Date/Time Patient Seen:  		  Referring MD:   Data Reviewed	       Patient is a 72y old  Male who presents with a chief complaint of Afib w/ RVR + COVID (17 Jun 2022 15:58)      Subjective/HPI     PAST MEDICAL & SURGICAL HISTORY:  TBI (traumatic brain injury)    HTN (hypertension)    Atrial fibrillation    DM (diabetes mellitus)    Peripheral neuropathy    Major depression    HLD (hyperlipidemia)    CAD (coronary artery disease)    BPH (benign prostatic hyperplasia)    No significant past surgical history          Medication list         MEDICATIONS  (STANDING):  ammonium lactate 12% Lotion 1 Application(s) Topical two times a day  apixaban 2.5 milliGRAM(s) Oral two times a day  aspirin  chewable 81 milliGRAM(s) Oral daily  atorvastatin 40 milliGRAM(s) Oral at bedtime  bethanechol 10 milliGRAM(s) Oral three times a day  buPROPion XL (24-Hour) . 150 milliGRAM(s) Oral daily  cholecalciferol 1000 Unit(s) Oral daily  diltiazem    milliGRAM(s) Oral daily  gabapentin 300 milliGRAM(s) Oral three times a day  latanoprost 0.005% Ophthalmic Solution 1 Drop(s) Both EYES at bedtime  losartan 25 milliGRAM(s) Oral daily  methylphenidate 20 milliGRAM(s) Oral daily  polyethylene glycol 3350 17 Gram(s) Oral two times a day  senna 2 Tablet(s) Oral at bedtime  tamsulosin 0.4 milliGRAM(s) Oral at bedtime  tiZANidine 4 milliGRAM(s) Oral three times a day    MEDICATIONS  (PRN):  acetaminophen     Tablet .. 650 milliGRAM(s) Oral every 6 hours PRN Temp greater or equal to 38C (100.4F), Mild Pain (1 - 3)  aluminum hydroxide/magnesium hydroxide/simethicone Suspension 30 milliLiter(s) Oral every 4 hours PRN Dyspepsia  guaiFENesin Oral Liquid (Sugar-Free) 200 milliGRAM(s) Oral every 6 hours PRN Cough  melatonin 3 milliGRAM(s) Oral at bedtime PRN Insomnia  ondansetron Injectable 4 milliGRAM(s) IV Push every 8 hours PRN Nausea and/or Vomiting         Vitals log        ICU Vital Signs Last 24 Hrs  T(C): 36.4 (17 Jun 2022 21:27), Max: 36.5 (17 Jun 2022 12:50)  T(F): 97.6 (17 Jun 2022 21:27), Max: 97.7 (17 Jun 2022 12:50)  HR: 89 (17 Jun 2022 21:27) (84 - 89)  BP: 122/80 (17 Jun 2022 21:27) (122/80 - 133/86)  BP(mean): --  ABP: --  ABP(mean): --  RR: 18 (17 Jun 2022 21:27) (18 - 18)  SpO2: 95% (17 Jun 2022 21:27) (95% - 95%)           Input and Output:  I&O's Detail    16 Jun 2022 07:01  -  17 Jun 2022 07:00  --------------------------------------------------------  IN:    Oral Fluid: 100 mL  Total IN: 100 mL    OUT:    Incontinent per Condom Catheter (mL): 1200 mL    Stool (mL): 0 mL  Total OUT: 1200 mL    Total NET: -1100 mL      17 Jun 2022 07:01  -  18 Jun 2022 06:50  --------------------------------------------------------  IN:  Total IN: 0 mL    OUT:    Voided (mL): 600 mL  Total OUT: 600 mL    Total NET: -600 mL          Lab Data                        12.7   6.94  )-----------( 171      ( 17 Jun 2022 07:55 )             38.4     06-17    143  |  110<H>  |  23  ----------------------------<  103<H>  3.8   |  26  |  0.87    Ca    8.4<L>      17 Jun 2022 07:55    TPro  5.5<L>  /  Alb  2.8<L>  /  TBili  0.3  /  DBili  0.1  /  AST  12<L>  /  ALT  18  /  AlkPhos  77  06-17            Review of Systems	      Objective     Physical Examination    heart s1s2  lung dec BS  abd soft      Pertinent Lab findings & Imaging      Geraldo:  NO   Adequate UO     I&O's Detail    16 Jun 2022 07:01  -  17 Jun 2022 07:00  --------------------------------------------------------  IN:    Oral Fluid: 100 mL  Total IN: 100 mL    OUT:    Incontinent per Condom Catheter (mL): 1200 mL    Stool (mL): 0 mL  Total OUT: 1200 mL    Total NET: -1100 mL      17 Jun 2022 07:01  -  18 Jun 2022 06:50  --------------------------------------------------------  IN:  Total IN: 0 mL    OUT:    Voided (mL): 600 mL  Total OUT: 600 mL    Total NET: -600 mL               Discussed with:     Cultures:	        Radiology

## 2022-06-18 NOTE — PROGRESS NOTE ADULT - SUBJECTIVE AND OBJECTIVE BOX
[facility] -- Janak Edwards, Alexey العراقي, Agus Oglesby [place], [place]  Office # [phone]    Follow Up:  Afib with RVR    Subjective/Observations: Seen and evaluated, comfortable on RA.  Admits to dry cough but no SOB or orthopnea.  Denies chest pain or palpitations    REVIEW OF SYSTEMS: All other review of systems is negative unless indicated above  PAST MEDICAL & SURGICAL HISTORY:  TBI (traumatic brain injury)      HTN (hypertension)      Atrial fibrillation      Peripheral neuropathy      Major depression      HLD (hyperlipidemia)      CAD (coronary artery disease)      BPH (benign prostatic hyperplasia)    No significant past surgical history    MEDICATIONS  (STANDING):  ammonium lactate 12% Lotion 1 Application(s) Topical two times a day  apixaban 2.5 milliGRAM(s) Oral two times a day  aspirin  chewable 81 milliGRAM(s) Oral daily  atorvastatin 40 milliGRAM(s) Oral at bedtime  benzonatate 100 milliGRAM(s) Oral three times a day  bethanechol 10 milliGRAM(s) Oral three times a day  buPROPion XL (24-Hour) . 150 milliGRAM(s) Oral daily  cholecalciferol 1000 Unit(s) Oral daily  diltiazem    milliGRAM(s) Oral daily  fluticasone propionate 50 MICROgram(s)/spray Nasal Spray 1 Spray(s) Both Nostrils two times a day  gabapentin 300 milliGRAM(s) Oral three times a day  latanoprost 0.005% Ophthalmic Solution 1 Drop(s) Both EYES at bedtime  losartan 25 milliGRAM(s) Oral daily  methylphenidate 20 milliGRAM(s) Oral daily  polyethylene glycol 3350 17 Gram(s) Oral two times a day  senna 2 Tablet(s) Oral at bedtime  tamsulosin 0.4 milliGRAM(s) Oral at bedtime  tiZANidine 4 milliGRAM(s) Oral three times a day    MEDICATIONS  (PRN):  acetaminophen     Tablet .. 650 milliGRAM(s) Oral every 6 hours PRN Temp greater or equal to 38C (100.4F), Mild Pain (1 - 3)  aluminum hydroxide/magnesium hydroxide/simethicone Suspension 30 milliLiter(s) Oral every 4 hours PRN Dyspepsia  guaiFENesin Oral Liquid (Sugar-Free) 200 milliGRAM(s) Oral every 6 hours PRN Cough  melatonin 3 milliGRAM(s) Oral at bedtime PRN Insomnia  ondansetron Injectable 4 milliGRAM(s) IV Push every 8 hours PRN Nausea and/or Vomiting    Allergies    No Known Allergies    Intolerances    Vital Signs Last 24 Hrs  T(C): 36.2 (18 Jun 2022 12:05), Max: 36.5 (17 Jun 2022 12:50)  T(F): 97.2 (18 Jun 2022 12:05), Max: 97.7 (17 Jun 2022 12:50)  HR: 71 (18 Jun 2022 12:05) (71 - 96)  BP: 126/81 (18 Jun 2022 12:05) (122/80 - 143/89)  BP(mean): --  RR: 18 (18 Jun 2022 12:05) (18 - 18)  SpO2: 94% (18 Jun 2022 12:05) (93% - 95%)  I&O's Summary    17 Jun 2022 07:01  -  18 Jun 2022 07:00  --------------------------------------------------------  IN: 0 mL / OUT: 600 mL / NET: -600 mL     PHYSICAL EXAM:  TELE: NSR  Constitutional: NAD, awake and alert, well-developed  HEENT: Moist Mucous Membranes, Anicteric  Pulmonary: Non-labored, breath sounds are clear but diminished bilaterally, No wheezing, rales or rhonchi  Cardiovascular: Regular, S1 and S2, No murmurs, rubs, gallops or clicks  Gastrointestinal: Bowel Sounds present, soft, nontender.   Lymph: No peripheral edema. No lymphadenopathy.  Skin: No visible rashes or ulcers.  Psych:  Mood & affect: Flat  LABS: All Labs Reviewed:                        13.1   8.16  )-----------( 182      ( 18 Jun 2022 08:32 )             40.2                         12.7   6.94  )-----------( 171      ( 17 Jun 2022 07:55 )             38.4                         13.1   8.47  )-----------( 158      ( 16 Jun 2022 07:49 )             40.1     18 Jun 2022 08:32    145    |  112    |  18     ----------------------------<  116    3.9     |  26     |  0.90   17 Jun 2022 07:55    143    |  110    |  23     ----------------------------<  103    3.8     |  26     |  0.87   16 Jun 2022 16:58    x      |  x      |  x      ----------------------------<  x      x       |  x      |  0.92     Ca    8.7        18 Jun 2022 08:32  Ca    8.4        17 Jun 2022 07:55  Ca    8.8        16 Jun 2022 07:49    TPro  5.9    /  Alb  3.1    /  TBili  0.3    /  DBili  0.1    /  AST  11     /  ALT  16     /  AlkPhos  88     18 Jun 2022 08:32  TPro  5.5    /  Alb  2.8    /  TBili  0.3    /  DBili  0.1    /  AST  12     /  ALT  18     /  AlkPhos  77     17 Jun 2022 07:55  TPro  5.4    /  Alb  2.7    /  TBili  0.2    /  DBili  <0.1   /  AST  15     /  ALT  16     /  AlkPhos  81     16 Jun 2022 16:58    PT/INR - ( 18 Jun 2022 08:32 )   PT: 15.1 sec;   INR: 1.29 ratio        ACC: [id number] EXAM:  ECHO TTE WO CON COMP W DOPP                          PROCEDURE DATE:  06/15/2022          INTERPRETATION:  INDICATION: Abnormal EKG  Sonographer KL    Blood Pressure 104/63    Height 165.1 cm     Weight 65.8 kg       BSA   1.73sq m    Dimensions:  LA 4.1       Normal Values: 2.0 - 4.0 cm  Ao 3.2        Normal Values: 2.0 - 3.8 cm  SEPTUM 1.3       Normal Values: 0.6 - 1.2 cm  PWT 1.2       Normal Values: 0.6 - 1.1 cm  LVIDd 4.3         Normal Values: 3.0 - 5.6 cm  LVIDs 2.7         Normal Values: 1.8 - 4.0 cm      OBSERVATIONS:  Mitral Valve: Trace to mild MR.  Aortic Valve/Aorta: normal trileaflet aortic valve.  Tricuspid Valve: normal with trace TR.  Pulmonic Valve: Trace PI  Left Atrium: Enlarged  Right Atrium: Not well-visualized  Left Ventricle: Mild left ventricular hypertrophy with normal systolic   function, estimated LVEF of 60%.  Right Ventricle: Grossly normal size and systolic function.  Pericardium: no significant pericardial effusion.  Pulmonary/RV Pressure: estimated PA systolic pressure of 18 mmHg    IMPRESSION:  Mild left ventricular hypertrophy with normal systolic function,   estimated LVEF of 60%.  Grossly normal RV size and systolic function.  Left atrial enlargement  Normal trileaflet aortic valve, without AI.  Trace to mild MR  Trace TR.  No significant pericardial effusion.    --- End of Report ---  KLAUS MUNIZ MD; Attending Cardiologist  This document has been electronically signed. Jun 16 2022  4:54PM    ACC: [id number] EXAM:  XR CHEST PORTABLE URGENT 1V                          PROCEDURE DATE:  06/14/2022      INTERPRETATION:  AP semierect chest on June 14, 2020 at 11:05 AM. Patient   has sepsis.    Heart magnified by technique.    Lungs remain clear.    Chest is similar to May 22.    IMPRESSION: No acute finding or change.    --- End of Report ---    SUSU TODD MD; Attending Radiologist  This document has been electronically signed. Jun 14 2022 12:34PM    Ventricular Rate 123 BPM    QRS Duration 78 ms    Q-T Interval 298 ms    QTC Calculation(Bazett) 426 ms    R Axis 200 degrees    T Axis 5 degrees    Diagnosis Line Atrial fibrillation with rapid ventricular response  Right superior axis deviation  Right ventricular hypertrophy  Septal infarct (cited on or before 22-MAY-2022)  Abnormal ECG  When compared with ECG of 22-MAY-2022 14:51,  No significant change was found  Confirmed by TOMMY OGLESBY ([id number]) on 6/14/2022 12:39:22 PM

## 2022-06-18 NOTE — PROGRESS NOTE ADULT - SUBJECTIVE AND OBJECTIVE BOX
Patient is a 72y old  Male who presents with a chief complaint of Afib w/ RVR + COVID (2022 15:58)    HPI:  Pt is a 72 y.o. M with a PMH of HTN, AFIB, BPH, hx of TBI, HLD, CAD, peripheral neuropathy, found to be COVID+ who presented to the ED with CC of fatigue. Pt is not the best historian after having TBI 2/2 fall on AC, so history taken via phone from wife. Pt reports that this morning he woke up "feeling lousy" with a fever and a cough. Pt tested COVID + today. Pt restarted eliquis 2 days ago, and follows up with Dr. Goldsmith for cardiology while outpatient. Pt endorses fever, lethargy diminished sensation on hands and feet 2/2 neuropathy, loss of balance, cough, constipation (last BM yesterday, but before that 1 week prior). Denies CP, palpitations, n/v/d, abdominal pain, leg pain. as per wife pt had some friends who visited pt & pt also has 2 HHA .    In the ED:  T 101.2 F Oral  /90 RR 18 SpO2 98% on RA  WBC 11.44 INR 1.3 Glucose 154 Procal 0.11 COVID +  CT Head Non-Con: Mild to moderate chronic microvascular changes. 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   CXR: Lung Clear.  ECG: AFib RVR  2L NS Bolus x1, 1g IV Tylenol, 1g Rocephin (2022 13:13)      INTERVAL HPI:  6/15/22: Patient seen and examined at bedside. No acute events overnight. Patient on BB CCB w/ rate-controlled HR ~80's. On remdesivir for COVID. saturating well on RA. On eliquis for Afib/DVT. Endorses feeling better without significant dyspnea or cough, but has some fatigue/malaise. ON Remdisivir IVPB daily   22: Patient seen and examined at bedside. No acute events overnight. Rate controlled. HDS. On remdesivir saturating well on RA. Endourses cough but no dyspnea. "I Feel well." S/P IV Remdesivir daily x 3 dose.  22: Patient seen and examined at bedside. No acute events overnight. s/p remdesivir x3d saturating well on RA. Continued cough no dyspnea. "I feel well." rate controlled increased to cardizem 120 ER qd w/ STAT dose today. dc planning -> per , unable to obtain home health aide today, will attempt over weekend ".i want to go home"  22 Pt seen and examined at bedside. Pt states he feels "fine". Pt states he still has a dry cough. Also admits to nasal drip. Pt denies fever, chills, SOB, STACK, CP, palpitations.      OVERNIGHT EVENTS: Overnight no acute events.     Home Medications:  aspirin 81 mg oral tablet: 1 tab(s) orally once a day (2022 13:52)  bethanechol 10 mg oral tablet: 1 tab(s) orally 3 times a day at (8am, 3pm, 10pm) (2022 13:52)  buPROPion 75 mg oral tablet: 1 tab(s) orally 2 times a day (10am, 10pm) (2022 13:52)  Eliquis 2.5 mg oral tablet: 1 tab(s) orally 2 times a day (10am, 10pm)t (2022 13:52)  gabapentin 300 mg oral capsule: 1 cap(s) orally 3 times a day (8am, 3pm, 10pm (2022 13:52)  latanoprost 0.005% ophthalmic solution: 1 drop(s) to each affected eye once a day (in the evening) (2022 13:52)  methylphenidate 10 mg oral tablet: 2 tab(s) orally once a day (in the morning) (2022 13:52)  rosuvastatin 10 mg oral tablet: 1 tab(s) orally once a day (2022 13:52)  tamsulosin 0.4 mg oral capsule: 1 cap(s) orally once a day (in the evening) (2022 13:52)  tiZANidine 4 mg oral tablet: 1 tab(s) orally 3 times a day (2022 13:52)  Vitamin D3 25 mcg (1000 intl units) oral tablet: 1 tab(s) orally once a day (2022 13:52)      MEDICATIONS  (STANDING):  ammonium lactate 12% Lotion 1 Application(s) Topical two times a day  apixaban 2.5 milliGRAM(s) Oral two times a day  aspirin  chewable 81 milliGRAM(s) Oral daily  atorvastatin 40 milliGRAM(s) Oral at bedtime  benzonatate 100 milliGRAM(s) Oral three times a day  bethanechol 10 milliGRAM(s) Oral three times a day  buPROPion XL (24-Hour) . 150 milliGRAM(s) Oral daily  cholecalciferol 1000 Unit(s) Oral daily  diltiazem    milliGRAM(s) Oral daily  fluticasone propionate 50 MICROgram(s)/spray Nasal Spray 1 Spray(s) Both Nostrils two times a day  gabapentin 300 milliGRAM(s) Oral three times a day  latanoprost 0.005% Ophthalmic Solution 1 Drop(s) Both EYES at bedtime  losartan 25 milliGRAM(s) Oral daily  methylphenidate 20 milliGRAM(s) Oral daily  polyethylene glycol 3350 17 Gram(s) Oral two times a day  senna 2 Tablet(s) Oral at bedtime  tamsulosin 0.4 milliGRAM(s) Oral at bedtime  tiZANidine 4 milliGRAM(s) Oral three times a day    MEDICATIONS  (PRN):  acetaminophen     Tablet .. 650 milliGRAM(s) Oral every 6 hours PRN Temp greater or equal to 38C (100.4F), Mild Pain (1 - 3)  aluminum hydroxide/magnesium hydroxide/simethicone Suspension 30 milliLiter(s) Oral every 4 hours PRN Dyspepsia  guaiFENesin Oral Liquid (Sugar-Free) 200 milliGRAM(s) Oral every 6 hours PRN Cough  melatonin 3 milliGRAM(s) Oral at bedtime PRN Insomnia  ondansetron Injectable 4 milliGRAM(s) IV Push every 8 hours PRN Nausea and/or Vomiting      Allergies    No Known Allergies    Intolerances        REVIEW OF SYSTEMS: "I feel fine"   CONSTITUTIONAL: No fever, No chills, No fatigue, No myalgia, No Body ache, ADMITS Weakness  EYES: No eye pain,  No visual disturbances, No discharge, No Redness  ENMT: ADMITS to nasal drip, No ear pain, No nose bleed, No vertigo; No sinus pain, No throat pain, No Congestion  NECK: No pain, No stiffness  RESPIRATORY: ADMITS dry cough, No wheezing, No hemoptysis, No shortness of breath  CARDIOVASCULAR: No chest pain, No palpitations  GASTROINTESTINAL: No abdominal pain, No epigastric pain. No nausea, No vomiting, No diarrhea, No constipation; [ x ] BM  GENITOURINARY: No dysuria, No frequency, No urgency, No hematuria, No incontinence  NEUROLOGICAL: No headaches, No dizziness, No numbness, No tingling, No tremors, No weakness  EXTREMITIES: No Swelling, No Pain, No Edema  SKIN: [ x ] No itching, burning, rashes, or lesions   MUSCULOSKELETAL: No joint pain, No joint swelling; No muscle pain, No back pain, No extremity pain  PSYCHIATRIC: No depression, No anxiety, No mood swings, No difficulty sleeping at night  PAIN SCALE: [ x ] None  [  ] Other-  ROS Unable to obtain due to: [  ] Dementia  [  ] Lethargy  [  ] Sedated  [  ] Non verbal  REST OF REVIEW OF SYSTEMS: [ x ] Normal       Vital Signs Last 24 Hrs  T(C): 36.2 (2022 12:05), Max: 36.5 (2022 12:50)  T(F): 97.2 (2022 12:05), Max: 97.7 (2022 12:50)  HR: 71 (2022 12:05) (71 - 96)  BP: 126/81 (2022 12:05) (122/80 - 143/89)  BP(mean): --  RR: 18 (2022 12:05) (18 - 18)  SpO2: 94% (2022 12:05) (93% - 95%)    Finger Stick        06-17 @ 07:01  -  06-18 @ 07:00  --------------------------------------------------------  IN: 0 mL / OUT: 600 mL / NET: -600 mL      PHYSICAL EXAM:  GENERAL:  [ x ] NAD, [  x] Well appearing, lying back in bed watching TV [  ] Agitated, [  ] Drowsy, [  ] Lethargy, [  ] Confused   HEAD:  [ x ] Normal, [  ] Other  EYES:  [ x ] EOMI, [  ] PERRLA, [x  ] Conjunctiva and sclera clear normal, [  ] Other, [  ] Pallor, [  ] Discharge  ENMT:  [ x ] Normal, [ x ] Moist mucous membranes, [ x ] Good dentition, [ x ] No thrush  NECK:  [ x ] Supple, [ x ] No JVD, [ x ] Normal thyroid, [  ] Lymphadenopathy, [  ] Other  CHEST/LUNG:  [ x ] Clear to auscultation bilaterally, [ x ] Breath Sounds equal B/L  [  ] Poor effort, [x  ] No rales, [  x] mild rhonchi on right base [ x ] mild expiratory wheeze on right base   HEART:  [ x ] Regular rate, [  ] Tachycardia, [  ] Bradycardia, [ x ] Irregular, [x  ] No murmurs, No rubs, No gallops, [  ] PPM in place (Mfr:  )  ABDOMEN:  [ x ] Soft, [x  ] Nontender, [ x ] Nondistended, [ x ] No mass, [ x ] Bowel sounds present, [  ] Obese  NERVOUS SYSTEM:  [ x ] Alert & Oriented x3, [ x ] Nonfocal, [  ] Confusion, [  ] Encephalopathic, [  ] Sedated, [  ] Unable to assess, [  ] Dementia, [ x ] Other-contractures EXT & Hand B/L   EXTREMITIES:  [ x ] 2+ Peripheral Pulses, No clubbing, No cyanosis,  [  ] Edema B/L lower EXT, [  ] PVD stasis skin changes B/L lower EXT, [  ] Wound [ x ] spasticity of all 4 extremities with prominent contractures of extensors of hands b/l  LYMPH:  No lymphadenopathy noted  SKIN:  [ x ] No rashes or lesions, [  ] Pressure ulcers, [  ] Ecchymosis, [  ] Skin tears, [  ] Other      DIET: Diet, Regular (22 @ 14:17)    LABS:                        13.1   8.16  )-----------( 182      ( 2022 08:32 )             40.2     2022 08:32    145    |  112    |  18     ----------------------------<  116    3.9     |  26     |  0.90     Ca    8.7        2022 08:32    TPro  5.9    /  Alb  3.1    /  TBili  0.3    /  DBili  0.1    /  AST  11     /  ALT  16     /  AlkPhos  88     2022 08:32    PT/INR - ( 2022 08:32 )   PT: 15.1 sec;   INR: 1.29 ratio           Urinalysis Basic - ( 2022 08:11 )    Color: Yellow / Appearance: Clear / S.020 / pH: x  Gluc: x / Ketone: Negative  / Bili: Negative / Urobili: Negative   Blood: x / Protein: 15 / Nitrite: Negative   Leuk Esterase: Trace / RBC: 0-2 /HPF / WBC 0-2   Sq Epi: x / Non Sq Epi: Occasional / Bacteria: x      Culture Results:   10,000 - 49,000 CFU/mL Gram Negative Rods  <10,000 CFU/ml Normal Urogenital carmen present ( @ 12:29)  Culture Results:   No growth to date. ( @ 16:30)  Culture Results:   No growth to date. ( @ 16:30)    culture blood  -- Clean Catch Clean Catch (Midstream)  @ 12:29    culture urine  --   @ 12:29          Culture - Urine (collected 2022 12:29)  Source: Clean Catch Clean Catch (Midstream)  Preliminary Report (2022 08:24):    10,000 - 49,000 CFU/mL Gram Negative Rods    <10,000 CFU/ml Normal Urogenital carmen present    Culture - Blood (collected 2022 16:30)  Source: .Blood Blood-Peripheral  Preliminary Report (15 Juan 2022 17:01):    No growth to date.    Culture - Blood (collected 2022 16:30)  Source: .Blood Blood-Peripheral  Preliminary Report (15 Juan 2022 17:01):    No growth to date.       Anemia Panel:  Ferritin, Serum: 113 ng/mL (06-15-22 @ 10:55)      Thyroid Panel:                RADIOLOGY & ADDITIONAL TESTS:    HEALTH ISSUES - PROBLEM Dx:  Atrial fibrillation    HTN (hypertension)    TBI (traumatic brain injury)    2019 novel coronavirus disease (COVID-19)    HLD (hyperlipidemia)    Need for prophylactic measure    BPH (benign prostatic hyperplasia)    CAD (coronary artery disease)    Constipation    Major depression          Consultant(s) Notes Reviewed:  [ x ] YES     Care Discussed with [  ] Consultants, [ x ] Patient, [  ] Family, [  ] HCP, [x  ] , [  ] Social Service, [  ] RN, [  ] Physical Therapy, [  ] Palliative Care Team  DVT PPX: [  ] Lovenox, [  ] SC Heparin, [  ] Coumadin, [  ] Xarelto, [ x ] Eliquis, [  ] Pradaxa, [  ] IV Heparin drip, [  ] SCD, [  ] Ambulation, [  ] Contraindicated 2/2 GI Bleed, [  ] Contraindicated 2/2  Bleed, [  ] Contraindicated 2/2 Brain Bleed  Advanced Directive: [ x ] None, [  ] DNR/DNI   Patient is a 72y old  Male who presents with a chief complaint of Afib w/ RVR + COVID (2022 15:58)    HPI:  Pt is a 72 y.o. M with a PMH of HTN, AFIB, BPH, hx of TBI, HLD, CAD, peripheral neuropathy, found to be COVID+ who presented to the ED with CC of fatigue. Pt is not the best historian after having TBI 2/2 fall on AC, so history taken via phone from wife. Pt reports that this morning he woke up "feeling lousy" with a fever and a cough. Pt tested COVID + today. Pt restarted eliquis 2 days ago, and follows up with Dr. Goldsmith for cardiology while outpatient. Pt endorses fever, lethargy diminished sensation on hands and feet 2/2 neuropathy, loss of balance, cough, constipation (last BM yesterday, but before that 1 week prior). Denies CP, palpitations, n/v/d, abdominal pain, leg pain. as per wife pt had some friends who visited pt & pt also has 2 HHA .    In the ED:  T 101.2 F Oral  /90 RR 18 SpO2 98% on RA  WBC 11.44 INR 1.3 Glucose 154 Procal 0.11 COVID +  CT Head Non-Con: Mild to moderate chronic microvascular changes. 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   CXR: Lung Clear.  ECG: AFib RVR  2L NS Bolus x1, 1g IV Tylenol, 1g Rocephin (2022 13:13)      INTERVAL HPI:  6/15/22: Patient seen and examined at bedside. No acute events overnight. Patient on BB CCB w/ rate-controlled HR ~80's. On remdesivir for COVID. saturating well on RA. On eliquis for Afib/DVT. Endorses feeling better without significant dyspnea or cough, but has some fatigue/malaise. ON Remdisivir IVPB daily   22: Patient seen and examined at bedside. No acute events overnight. Rate controlled. HDS. On remdesivir saturating well on RA. Endourses cough but no dyspnea. "I Feel well." S/P IV Remdesivir daily x 3 dose.  22: Patient seen and examined at bedside. No acute events overnight. s/p remdesivir x3d saturating well on RA. Continued cough no dyspnea. "I feel well." rate controlled increased to cardizem 120 ER qd w/ STAT dose today. dc planning -> per , unable to obtain home health aide today, will attempt over weekend ".i want to go home"  22 Pt seen and examined at bedside. Pt states he feels "fine". Pt states he still has a dry cough. Also admits to nasal drip. Pt denies fever, chills, SOB, STACK, CP, palpitations. D/C planning.    OVERNIGHT EVENTS: Overnight no acute events.     Home Medications:  aspirin 81 mg oral tablet: 1 tab(s) orally once a day (2022 13:52)  bethanechol 10 mg oral tablet: 1 tab(s) orally 3 times a day at (8am, 3pm, 10pm) (2022 13:52)  buPROPion 75 mg oral tablet: 1 tab(s) orally 2 times a day (10am, 10pm) (2022 13:52)  Eliquis 2.5 mg oral tablet: 1 tab(s) orally 2 times a day (10am, 10pm)t (2022 13:52)  gabapentin 300 mg oral capsule: 1 cap(s) orally 3 times a day (8am, 3pm, 10pm (2022 13:52)  latanoprost 0.005% ophthalmic solution: 1 drop(s) to each affected eye once a day (in the evening) (2022 13:52)  methylphenidate 10 mg oral tablet: 2 tab(s) orally once a day (in the morning) (2022 13:52)  rosuvastatin 10 mg oral tablet: 1 tab(s) orally once a day (2022 13:52)  tamsulosin 0.4 mg oral capsule: 1 cap(s) orally once a day (in the evening) (2022 13:52)  tiZANidine 4 mg oral tablet: 1 tab(s) orally 3 times a day (2022 13:52)  Vitamin D3 25 mcg (1000 intl units) oral tablet: 1 tab(s) orally once a day (2022 13:52)      MEDICATIONS  (STANDING):  ammonium lactate 12% Lotion 1 Application(s) Topical two times a day  apixaban 2.5 milliGRAM(s) Oral two times a day  aspirin  chewable 81 milliGRAM(s) Oral daily  atorvastatin 40 milliGRAM(s) Oral at bedtime  benzonatate 100 milliGRAM(s) Oral three times a day  bethanechol 10 milliGRAM(s) Oral three times a day  buPROPion XL (24-Hour) . 150 milliGRAM(s) Oral daily  cholecalciferol 1000 Unit(s) Oral daily  diltiazem    milliGRAM(s) Oral daily  fluticasone propionate 50 MICROgram(s)/spray Nasal Spray 1 Spray(s) Both Nostrils two times a day  gabapentin 300 milliGRAM(s) Oral three times a day  latanoprost 0.005% Ophthalmic Solution 1 Drop(s) Both EYES at bedtime  losartan 25 milliGRAM(s) Oral daily  methylphenidate 20 milliGRAM(s) Oral daily  polyethylene glycol 3350 17 Gram(s) Oral two times a day  senna 2 Tablet(s) Oral at bedtime  tamsulosin 0.4 milliGRAM(s) Oral at bedtime  tiZANidine 4 milliGRAM(s) Oral three times a day    MEDICATIONS  (PRN):  acetaminophen     Tablet .. 650 milliGRAM(s) Oral every 6 hours PRN Temp greater or equal to 38C (100.4F), Mild Pain (1 - 3)  aluminum hydroxide/magnesium hydroxide/simethicone Suspension 30 milliLiter(s) Oral every 4 hours PRN Dyspepsia  guaiFENesin Oral Liquid (Sugar-Free) 200 milliGRAM(s) Oral every 6 hours PRN Cough  melatonin 3 milliGRAM(s) Oral at bedtime PRN Insomnia  ondansetron Injectable 4 milliGRAM(s) IV Push every 8 hours PRN Nausea and/or Vomiting      Allergies    No Known Allergies    Intolerances        REVIEW OF SYSTEMS: "I feel fine"   CONSTITUTIONAL: No fever, No chills, No fatigue, No myalgia, No Body ache, ADMITS Weakness  EYES: No eye pain,  No visual disturbances, No discharge, No Redness  ENMT: ADMITS to nasal drip, No ear pain, No nose bleed, No vertigo; No sinus pain, No throat pain, No Congestion  NECK: No pain, No stiffness  RESPIRATORY: ADMITS dry cough, No wheezing, No hemoptysis, No shortness of breath  CARDIOVASCULAR: No chest pain, No palpitations  GASTROINTESTINAL: No abdominal pain, No epigastric pain. No nausea, No vomiting, No diarrhea, No constipation; [ x ] BM  GENITOURINARY: No dysuria, No frequency, No urgency, No hematuria, No incontinence  NEUROLOGICAL: No headaches, No dizziness, No numbness, No tingling, No tremors, No weakness  EXTREMITIES: No Swelling, No Pain, No Edema  SKIN: [ x ] No itching, burning, rashes, or lesions   MUSCULOSKELETAL: No joint pain, No joint swelling; No muscle pain, No back pain, No extremity pain  PSYCHIATRIC: No depression, No anxiety, No mood swings, No difficulty sleeping at night  PAIN SCALE: [ x ] None  [  ] Other-  ROS Unable to obtain due to: [  ] Dementia  [  ] Lethargy  [  ] Sedated  [  ] Non verbal  REST OF REVIEW OF SYSTEMS: [ x ] Normal       Vital Signs Last 24 Hrs  T(C): 36.2 (2022 12:05), Max: 36.5 (2022 12:50)  T(F): 97.2 (2022 12:05), Max: 97.7 (2022 12:50)  HR: 71 (2022 12:05) (71 - 96)  BP: 126/81 (2022 12:05) (122/80 - 143/89)  BP(mean): --  RR: 18 (2022 12:05) (18 - 18)  SpO2: 94% (2022 12:05) (93% - 95%)    Finger Stick        06-17 @ 07:01  -  06-18 @ 07:00  --------------------------------------------------------  IN: 0 mL / OUT: 600 mL / NET: -600 mL      PHYSICAL EXAM:  GENERAL:  [ x ] NAD, [  x] Well appearing, lying back in bed watching TV [  ] Agitated, [  ] Drowsy, [  ] Lethargy, [  ] Confused   HEAD:  [ x ] Normal, [  ] Other  EYES:  [ x ] EOMI, [ x ] PERRLA, [x  ] Conjunctiva and sclera clear normal, [  ] Other, [  ] Pallor, [  ] Discharge  ENMT:  [ x ] Normal, [ x ] Moist mucous membranes, [ x ] Good dentition, [ x ] No thrush  NECK:  [ x ] Supple, [ x ] No JVD, [ x ] Normal thyroid, [  ] Lymphadenopathy, [  ] Other  CHEST/LUNG:  [ x ] Clear to auscultation bilaterally, [ x ] Breath Sounds equal B/L  [  ] Poor effort, [x  ] No rales, [  x] mild rhonchi on right base [ x ] mild expiratory wheeze on right base   HEART:  [ x ] Regular rate, [  ] Tachycardia, [  ] Bradycardia, [ x ] Irregular, [x  ] No murmurs, No rubs, No gallops, [  ] PPM in place (Mfr:  )  ABDOMEN:  [ x ] Soft, [x  ] Nontender, [ x ] Nondistended, [ x ] No mass, [ x ] Bowel sounds present, [  ] Obese  NERVOUS SYSTEM:  [ x ] Alert & Oriented x3, [ x ] Nonfocal, [  ] Confusion, [  ] Encephalopathic, [  ] Sedated, [  ] Unable to assess, [  ] Dementia, [ x ] Other-contractures EXT & Hand B/L   EXTREMITIES:  [ x ] 2+ Peripheral Pulses, No clubbing, No cyanosis,  [  ] Edema B/L lower EXT, [  ] PVD stasis skin changes B/L lower EXT, [  ] Wound [ x ] spasticity of all 4 extremities with prominent contractures of extensors of hands b/l  LYMPH:  No lymphadenopathy noted  SKIN:  [ x ] No rashes or lesions, [  ] Pressure ulcers, [  ] Ecchymosis, [  ] Skin tears, [  ] Other      DIET: Diet, Regular (22 @ 14:17)    LABS:                        13.1   8.16  )-----------( 182      ( 2022 08:32 )             40.2     2022 08:32    145    |  112    |  18     ----------------------------<  116    3.9     |  26     |  0.90     Ca    8.7        2022 08:32    TPro  5.9    /  Alb  3.1    /  TBili  0.3    /  DBili  0.1    /  AST  11     /  ALT  16     /  AlkPhos  88     2022 08:32    PT/INR - ( 2022 08:32 )   PT: 15.1 sec;   INR: 1.29 ratio           Urinalysis Basic - ( 2022 08:11 )    Color: Yellow / Appearance: Clear / S.020 / pH: x  Gluc: x / Ketone: Negative  / Bili: Negative / Urobili: Negative   Blood: x / Protein: 15 / Nitrite: Negative   Leuk Esterase: Trace / RBC: 0-2 /HPF / WBC 0-2   Sq Epi: x / Non Sq Epi: Occasional / Bacteria: x      Culture Results:   10,000 - 49,000 CFU/mL Gram Negative Rods  <10,000 CFU/ml Normal Urogenital carmen present ( @ 12:29)  Culture Results:   No growth to date. ( @ 16:30)  Culture Results:   No growth to date. ( @ 16:30)    culture blood  -- Clean Catch Clean Catch (Midstream)  @ 12:29    culture urine  --   @ 12:29    Culture - Urine (collected 2022 12:29)  Source: Clean Catch Clean Catch (Midstream)  Preliminary Report (2022 08:24):    10,000 - 49,000 CFU/mL Gram Negative Rods    <10,000 CFU/ml Normal Urogenital carmen present    Culture - Blood (collected 2022 16:30)  Source: .Blood Blood-Peripheral  Preliminary Report (15 Juan 2022 17:01):    No growth to date.    Culture - Blood (collected 2022 16:30)  Source: .Blood Blood-Peripheral  Preliminary Report (15 Juan 2022 17:01):    No growth to date.       Anemia Panel:  Ferritin, Serum: 113 ng/mL (06-15-22 @ 10:55)      Thyroid Panel:    RADIOLOGY & ADDITIONAL TESTS: NONE    HEALTH ISSUES - PROBLEM Dx:  Atrial fibrillation    HTN (hypertension)    TBI (traumatic brain injury)    2019 novel coronavirus disease (COVID-19)    HLD (hyperlipidemia)    Need for prophylactic measure    BPH (benign prostatic hyperplasia)    CAD (coronary artery disease)    Constipation    Major depression      Consultant(s) Notes Reviewed:  [ x ] YES     Care Discussed with [  ] Consultants, [ x ] Patient, [  x] Family,- wife  [  ] HCP, [x  ] , [  ] Social Service, [x  ] RN, [  ] Physical Therapy, [  ] Palliative Care Team  DVT PPX: [  ] Lovenox, [  ] SC Heparin, [  ] Coumadin, [  ] Xarelto, [ x ] Eliquis, [  ] Pradaxa, [  ] IV Heparin drip, [  ] SCD, [  ] Ambulation, [  ] Contraindicated 2/2 GI Bleed, [  ] Contraindicated 2/2  Bleed, [  ] Contraindicated 2/2 Brain Bleed  Advanced Directive: [ x ] None, [  ] DNR/DNI

## 2022-06-18 NOTE — PROGRESS NOTE ADULT - PROBLEM SELECTOR PLAN 2
COVID (+) - saturating well on RA  - c/w tylenol 650 mg po q6h prn fever  - dry cough: added tessalon perles, continue Robitussin   -nasal drip: started flonase   - c/w albuterol q6h prn  - c/w home Eliquis mg BID  - c/w remdesivir s/p 3 doses   - No steroids at this time  pulm following - Hao - will appreciate recs  ID - Nigel COVID (+) - saturating well on RA  - c/w tylenol 650 mg po q6h prn fever  - dry cough: added tessalon perles, continue Robitussin   -nasal drip: started Flonase   - c/w albuterol q6h prn  - c/w home Eliquis mg BID  - c/w remdesivir s/p 3 doses completed   - No steroids at this time  pulm following - Hao - will appreciate recs  ID - Nigel-robert for d/c

## 2022-06-19 LAB
-  AMIKACIN: SIGNIFICANT CHANGE UP
-  AZTREONAM: SIGNIFICANT CHANGE UP
-  CEFEPIME: SIGNIFICANT CHANGE UP
-  CEFTAZIDIME: SIGNIFICANT CHANGE UP
-  CIPROFLOXACIN: SIGNIFICANT CHANGE UP
-  GENTAMICIN: SIGNIFICANT CHANGE UP
-  IMIPENEM: SIGNIFICANT CHANGE UP
-  LEVOFLOXACIN: SIGNIFICANT CHANGE UP
-  MEROPENEM: SIGNIFICANT CHANGE UP
-  PIPERACILLIN/TAZOBACTAM: SIGNIFICANT CHANGE UP
-  TOBRAMYCIN: SIGNIFICANT CHANGE UP
ALBUMIN SERPL ELPH-MCNC: 2.7 G/DL — LOW (ref 3.3–5)
ALBUMIN SERPL ELPH-MCNC: 2.8 G/DL — LOW (ref 3.3–5)
ALP SERPL-CCNC: 82 U/L — SIGNIFICANT CHANGE UP (ref 40–120)
ALP SERPL-CCNC: 82 U/L — SIGNIFICANT CHANGE UP (ref 40–120)
ALT FLD-CCNC: 13 U/L — SIGNIFICANT CHANGE UP (ref 12–78)
ALT FLD-CCNC: 13 U/L — SIGNIFICANT CHANGE UP (ref 12–78)
ANION GAP SERPL CALC-SCNC: 3 MMOL/L — LOW (ref 5–17)
AST SERPL-CCNC: 7 U/L — LOW (ref 15–37)
AST SERPL-CCNC: 8 U/L — LOW (ref 15–37)
BASOPHILS # BLD AUTO: 0.04 K/UL — SIGNIFICANT CHANGE UP (ref 0–0.2)
BASOPHILS NFR BLD AUTO: 0.5 % — SIGNIFICANT CHANGE UP (ref 0–2)
BILIRUB DIRECT SERPL-MCNC: <0.1 MG/DL — SIGNIFICANT CHANGE UP (ref 0–0.3)
BILIRUB INDIRECT FLD-MCNC: >0.2 MG/DL — SIGNIFICANT CHANGE UP (ref 0.2–1)
BILIRUB SERPL-MCNC: 0.3 MG/DL — SIGNIFICANT CHANGE UP (ref 0.2–1.2)
BILIRUB SERPL-MCNC: 0.3 MG/DL — SIGNIFICANT CHANGE UP (ref 0.2–1.2)
BUN SERPL-MCNC: 16 MG/DL — SIGNIFICANT CHANGE UP (ref 7–23)
CALCIUM SERPL-MCNC: 8.6 MG/DL — SIGNIFICANT CHANGE UP (ref 8.5–10.1)
CHLORIDE SERPL-SCNC: 114 MMOL/L — HIGH (ref 96–108)
CO2 SERPL-SCNC: 29 MMOL/L — SIGNIFICANT CHANGE UP (ref 22–31)
CREAT SERPL-MCNC: 0.92 MG/DL — SIGNIFICANT CHANGE UP (ref 0.5–1.3)
CULTURE RESULTS: SIGNIFICANT CHANGE UP
EGFR: 89 ML/MIN/1.73M2 — SIGNIFICANT CHANGE UP
EOSINOPHIL # BLD AUTO: 0.38 K/UL — SIGNIFICANT CHANGE UP (ref 0–0.5)
EOSINOPHIL NFR BLD AUTO: 5 % — SIGNIFICANT CHANGE UP (ref 0–6)
GLUCOSE SERPL-MCNC: 150 MG/DL — HIGH (ref 70–99)
HCT VFR BLD CALC: 37 % — LOW (ref 39–50)
HGB BLD-MCNC: 12.1 G/DL — LOW (ref 13–17)
IMM GRANULOCYTES NFR BLD AUTO: 0.8 % — SIGNIFICANT CHANGE UP (ref 0–1.5)
INR BLD: 1.45 RATIO — HIGH (ref 0.88–1.16)
LYMPHOCYTES # BLD AUTO: 0.98 K/UL — LOW (ref 1–3.3)
LYMPHOCYTES # BLD AUTO: 12.9 % — LOW (ref 13–44)
MCHC RBC-ENTMCNC: 26.7 PG — LOW (ref 27–34)
MCHC RBC-ENTMCNC: 32.7 GM/DL — SIGNIFICANT CHANGE UP (ref 32–36)
MCV RBC AUTO: 81.5 FL — SIGNIFICANT CHANGE UP (ref 80–100)
METHOD TYPE: SIGNIFICANT CHANGE UP
MONOCYTES # BLD AUTO: 0.69 K/UL — SIGNIFICANT CHANGE UP (ref 0–0.9)
MONOCYTES NFR BLD AUTO: 9.1 % — SIGNIFICANT CHANGE UP (ref 2–14)
NEUTROPHILS # BLD AUTO: 5.47 K/UL — SIGNIFICANT CHANGE UP (ref 1.8–7.4)
NEUTROPHILS NFR BLD AUTO: 71.7 % — SIGNIFICANT CHANGE UP (ref 43–77)
NRBC # BLD: 0 /100 WBCS — SIGNIFICANT CHANGE UP (ref 0–0)
ORGANISM # SPEC MICROSCOPIC CNT: SIGNIFICANT CHANGE UP
ORGANISM # SPEC MICROSCOPIC CNT: SIGNIFICANT CHANGE UP
PLATELET # BLD AUTO: 177 K/UL — SIGNIFICANT CHANGE UP (ref 150–400)
POTASSIUM SERPL-MCNC: 3.5 MMOL/L — SIGNIFICANT CHANGE UP (ref 3.5–5.3)
POTASSIUM SERPL-SCNC: 3.5 MMOL/L — SIGNIFICANT CHANGE UP (ref 3.5–5.3)
PROT SERPL-MCNC: 5.2 G/DL — LOW (ref 6–8.3)
PROT SERPL-MCNC: 5.2 G/DL — LOW (ref 6–8.3)
PROTHROM AB SERPL-ACNC: 17 SEC — HIGH (ref 10.5–13.4)
RBC # BLD: 4.54 M/UL — SIGNIFICANT CHANGE UP (ref 4.2–5.8)
RBC # FLD: 14.6 % — HIGH (ref 10.3–14.5)
SODIUM SERPL-SCNC: 146 MMOL/L — HIGH (ref 135–145)
SPECIMEN SOURCE: SIGNIFICANT CHANGE UP
WBC # BLD: 7.62 K/UL — SIGNIFICANT CHANGE UP (ref 3.8–10.5)
WBC # FLD AUTO: 7.62 K/UL — SIGNIFICANT CHANGE UP (ref 3.8–10.5)

## 2022-06-19 PROCEDURE — 99232 SBSQ HOSP IP/OBS MODERATE 35: CPT

## 2022-06-19 RX ORDER — LOSARTAN POTASSIUM 100 MG/1
1 TABLET, FILM COATED ORAL
Qty: 30 | Refills: 0
Start: 2022-06-19 | End: 2022-07-18

## 2022-06-19 RX ORDER — LOSARTAN POTASSIUM 100 MG/1
2 TABLET, FILM COATED ORAL
Qty: 0 | Refills: 0 | DISCHARGE
Start: 2022-06-19 | End: 2022-07-18

## 2022-06-19 RX ADMIN — APIXABAN 2.5 MILLIGRAM(S): 2.5 TABLET, FILM COATED ORAL at 18:33

## 2022-06-19 RX ADMIN — GABAPENTIN 300 MILLIGRAM(S): 400 CAPSULE ORAL at 22:01

## 2022-06-19 RX ADMIN — Medication 1 SPRAY(S): at 04:54

## 2022-06-19 RX ADMIN — Medication 120 MILLIGRAM(S): at 04:54

## 2022-06-19 RX ADMIN — Medication 1 APPLICATION(S): at 18:33

## 2022-06-19 RX ADMIN — Medication 1 APPLICATION(S): at 04:54

## 2022-06-19 RX ADMIN — TIZANIDINE 4 MILLIGRAM(S): 4 TABLET ORAL at 14:26

## 2022-06-19 RX ADMIN — ATORVASTATIN CALCIUM 40 MILLIGRAM(S): 80 TABLET, FILM COATED ORAL at 22:02

## 2022-06-19 RX ADMIN — Medication 10 MILLIGRAM(S): at 22:02

## 2022-06-19 RX ADMIN — Medication 10 MILLIGRAM(S): at 14:24

## 2022-06-19 RX ADMIN — TIZANIDINE 4 MILLIGRAM(S): 4 TABLET ORAL at 22:01

## 2022-06-19 RX ADMIN — TAMSULOSIN HYDROCHLORIDE 0.4 MILLIGRAM(S): 0.4 CAPSULE ORAL at 22:01

## 2022-06-19 RX ADMIN — Medication 1 SPRAY(S): at 18:34

## 2022-06-19 RX ADMIN — Medication 20 MILLIGRAM(S): at 14:32

## 2022-06-19 RX ADMIN — POLYETHYLENE GLYCOL 3350 17 GRAM(S): 17 POWDER, FOR SOLUTION ORAL at 18:33

## 2022-06-19 RX ADMIN — Medication 81 MILLIGRAM(S): at 14:25

## 2022-06-19 RX ADMIN — Medication 100 MILLIGRAM(S): at 18:32

## 2022-06-19 RX ADMIN — LATANOPROST 1 DROP(S): 0.05 SOLUTION/ DROPS OPHTHALMIC; TOPICAL at 22:02

## 2022-06-19 RX ADMIN — LOSARTAN POTASSIUM 25 MILLIGRAM(S): 100 TABLET, FILM COATED ORAL at 04:53

## 2022-06-19 RX ADMIN — TIZANIDINE 4 MILLIGRAM(S): 4 TABLET ORAL at 04:54

## 2022-06-19 RX ADMIN — POLYETHYLENE GLYCOL 3350 17 GRAM(S): 17 POWDER, FOR SOLUTION ORAL at 04:53

## 2022-06-19 RX ADMIN — APIXABAN 2.5 MILLIGRAM(S): 2.5 TABLET, FILM COATED ORAL at 04:53

## 2022-06-19 RX ADMIN — SENNA PLUS 2 TABLET(S): 8.6 TABLET ORAL at 22:01

## 2022-06-19 RX ADMIN — Medication 100 MILLIGRAM(S): at 04:54

## 2022-06-19 RX ADMIN — GABAPENTIN 300 MILLIGRAM(S): 400 CAPSULE ORAL at 18:32

## 2022-06-19 RX ADMIN — BUPROPION HYDROCHLORIDE 150 MILLIGRAM(S): 150 TABLET, EXTENDED RELEASE ORAL at 14:24

## 2022-06-19 RX ADMIN — GABAPENTIN 300 MILLIGRAM(S): 400 CAPSULE ORAL at 04:53

## 2022-06-19 RX ADMIN — Medication 1000 UNIT(S): at 14:24

## 2022-06-19 RX ADMIN — Medication 10 MILLIGRAM(S): at 04:53

## 2022-06-19 RX ADMIN — Medication 100 MILLIGRAM(S): at 22:02

## 2022-06-19 NOTE — PROGRESS NOTE ADULT - PROBLEM SELECTOR PLAN 4
chronic, stable soft- on CCB, Start Cardizem  mg daily   -Losartan 25 mg 1 tab daily.  - STOP home eplerenone as per Cardiology.  - STOP lopressor for soft BP, rates well controlled  - STOP hydralazine given soft bp  - Cardio  out pt Cardiology follow up as out DR MARTÍNEZ Goldsmith... chronic, stable soft- on CCB, Start Cardizem  mg daily   -Losartan 25 mg 1 tab daily.  - c/w Cardizem  qd w/ hold parameters -> a 30 day prescription was written to your local pharm  - c/w Losartan 25 mg 1 tab qd -> a 30 day prescription was written to your local pharm  - STOP home eplerenone as per Cardiology.  - STOP lopressor for soft BP, rates well controlled  - STOP hydralazine given soft bp  - Cardio  out pt Cardiology follow up as out DR MARTÍNEZ Goldsmith...

## 2022-06-19 NOTE — PROGRESS NOTE ADULT - SUBJECTIVE AND OBJECTIVE BOX
[facility] -- Janak Edwards, Malcom, Alexey, Agus Oglesby Savella, Goodger  Office # [phone]    Follow Up: Afib with RVR     Subjective/Observations: Awake and alert, no complaints.  Still tolerating RA.  Denies chest pain or palpitations.  Denies cough, SOB or orthopnea    REVIEW OF SYSTEMS: All other review of systems is negative unless indicated above  PAST MEDICAL & SURGICAL HISTORY:  TBI (traumatic brain injury)      HTN (hypertension)      Atrial fibrillation      Peripheral neuropathy      Major depression      HLD (hyperlipidemia)      CAD (coronary artery disease)    BPH (benign prostatic hyperplasia)  No significant past surgical history  MEDICATIONS  (STANDING):  ammonium lactate 12% Lotion 1 Application(s) Topical two times a day  apixaban 2.5 milliGRAM(s) Oral two times a day  aspirin  chewable 81 milliGRAM(s) Oral daily  atorvastatin 40 milliGRAM(s) Oral at bedtime  benzonatate 100 milliGRAM(s) Oral three times a day  bethanechol 10 milliGRAM(s) Oral three times a day  buPROPion XL (24-Hour) . 150 milliGRAM(s) Oral daily  cholecalciferol 1000 Unit(s) Oral daily  diltiazem    milliGRAM(s) Oral daily  fluticasone propionate 50 MICROgram(s)/spray Nasal Spray 1 Spray(s) Both Nostrils two times a day  gabapentin 300 milliGRAM(s) Oral three times a day  latanoprost 0.005% Ophthalmic Solution 1 Drop(s) Both EYES at bedtime  losartan 25 milliGRAM(s) Oral daily  methylphenidate 20 milliGRAM(s) Oral daily  polyethylene glycol 3350 17 Gram(s) Oral two times a day  senna 2 Tablet(s) Oral at bedtime  tamsulosin 0.4 milliGRAM(s) Oral at bedtime  tiZANidine 4 milliGRAM(s) Oral three times a day    MEDICATIONS  (PRN):  acetaminophen     Tablet .. 650 milliGRAM(s) Oral every 6 hours PRN Temp greater or equal to 38C (100.4F), Mild Pain (1 - 3)  aluminum hydroxide/magnesium hydroxide/simethicone Suspension 30 milliLiter(s) Oral every 4 hours PRN Dyspepsia  guaiFENesin Oral Liquid (Sugar-Free) 200 milliGRAM(s) Oral every 6 hours PRN Cough  melatonin 3 milliGRAM(s) Oral at bedtime PRN Insomnia  ondansetron Injectable 4 milliGRAM(s) IV Push every 8 hours PRN Nausea and/or Vomiting    Allergies    No Known Allergies    Intolerances    Vital Signs Last 24 Hrs  T(C): 36.8 (19 Jun 2022 09:36), Max: 36.8 (19 Jun 2022 09:36)  T(F): 98.2 (19 Jun 2022 09:36), Max: 98.2 (19 Jun 2022 09:36)  HR: 76 (19 Jun 2022 09:36) (71 - 87)  BP: 128/89 (19 Jun 2022 09:36) (126/81 - 159/90)  BP(mean): --  RR: 15 (19 Jun 2022 09:36) (15 - 18)  SpO2: 92% (19 Jun 2022 09:36) (92% - 95%)  I&O's Summary    18 Jun 2022 07:01  -  19 Jun 2022 07:00  --------------------------------------------------------  IN: 0 mL / OUT: 1200 mL / NET: -1200 mL    PHYSICAL EXAM:  TELE: Not on tele  Constitutional: NAD, awake and alert, well-developed  HEENT: Moist Mucous Membranes, Anicteric  Pulmonary: Non-labored, breath sounds are clear but diminished bilaterally, No wheezing, rales or rhonchi  Cardiovascular: IRRR, S1 and S2, No murmurs, rubs, gallops or clicks  Gastrointestinal: Bowel Sounds present, soft, nontender.   Lymph: No peripheral edema. No lymphadenopathy.  Skin: No visible rashes or ulcers.  Psych:  Mood & affect appropriate  LABS: All Labs Reviewed:                        12.1   7.62  )-----------( 177      ( 19 Jun 2022 08:30 )             37.0                         13.1   8.16  )-----------( 182      ( 18 Jun 2022 08:32 )             40.2                         12.7   6.94  )-----------( 171      ( 17 Jun 2022 07:55 )             38.4     19 Jun 2022 08:30    146    |  114    |  16     ----------------------------<  150    3.5     |  29     |  0.92   18 Jun 2022 08:32    145    |  112    |  18     ----------------------------<  116    3.9     |  26     |  0.90   17 Jun 2022 07:55    143    |  110    |  23     ----------------------------<  103    3.8     |  26     |  0.87     Ca    8.6        19 Jun 2022 08:30  Ca    8.7        18 Jun 2022 08:32  Ca    8.4        17 Jun 2022 07:55    TPro  5.2    /  Alb  2.8    /  TBili  0.3    /  DBili  <0.1   /  AST  7      /  ALT  13     /  AlkPhos  82     19 Jun 2022 08:30  TPro  5.9    /  Alb  3.1    /  TBili  0.3    /  DBili  0.1    /  AST  11     /  ALT  16     /  AlkPhos  88     18 Jun 2022 08:32  TPro  5.5    /  Alb  2.8    /  TBili  0.3    /  DBili  0.1    /  AST  12     /  ALT  18     /  AlkPhos  77     17 Jun 2022 07:55    PT/INR - ( 19 Jun 2022 08:30 )   PT: 17.0 sec;   INR: 1.45 ratio      ACC: [id number] EXAM:  ECHO TTE WO CON COMP W DOPP                          PROCEDURE DATE:  06/15/2022          INTERPRETATION:  INDICATION: Abnormal EKG  Sonographer KL    Blood Pressure 104/63    Height 165.1 cm     Weight 65.8 kg       BSA   1.73sq m    Dimensions:  LA 4.1       Normal Values: 2.0 - 4.0 cm  Ao 3.2        Normal Values: 2.0 - 3.8 cm  SEPTUM 1.3       Normal Values: 0.6 - 1.2 cm  PWT 1.2       Normal Values: 0.6 - 1.1 cm  LVIDd 4.3         Normal Values: 3.0 - 5.6 cm  LVIDs 2.7         Normal Values: 1.8 - 4.0 cm      OBSERVATIONS:  Mitral Valve: Trace to mild MR.  Aortic Valve/Aorta: normal trileaflet aortic valve.  Tricuspid Valve: normal with trace TR.  Pulmonic Valve: Trace PI  Left Atrium: Enlarged  Right Atrium: Not well-visualized  Left Ventricle: Mild left ventricular hypertrophy with normal systolic   function, estimated LVEF of 60%.  Right Ventricle: Grossly normal size and systolic function.  Pericardium: no significant pericardial effusion.  Pulmonary/RV Pressure: estimated PA systolic pressure of 18 mmHg    IMPRESSION:  Mild left ventricular hypertrophy with normal systolic function,   estimated LVEF of 60%.  Grossly normal RV size and systolic function.  Left atrial enlargement  Normal trileaflet aortic valve, without AI.  Trace to mild MR  Trace TR.  No significant pericardial effusion.    --- End of Report ---  KLAUS MUNIZ MD; Attending Cardiologist  This document has been electronically signed. Jun 16 2022  4:54PM    ACC: [id number] EXAM:  XR CHEST PORTABLE URGENT 1V                          PROCEDURE DATE:  06/14/2022      INTERPRETATION:  AP semierect chest on June 14, 2020 at 11:05 AM. Patient   has sepsis.    Heart magnified by technique.    Lungs remain clear.    Chest is similar to May 22.    IMPRESSION: No acute finding or change.    --- End of Report ---    SUSU TODD MD; Attending Radiologist  This document has been electronically signed. Jun 14 2022 12:34PM    Ventricular Rate 123 BPM    QRS Duration 78 ms    Q-T Interval 298 ms    QTC Calculation(Bazett) 426 ms    R Axis 200 degrees    T Axis 5 degrees    Diagnosis Line Atrial fibrillation with rapid ventricular response  Right superior axis deviation  Right ventricular hypertrophy  Septal infarct (cited on or before 22-MAY-2022)  Abnormal ECG  When compared with ECG of 22-MAY-2022 14:51,  No significant change was found  Confirmed by TOMMY OGLESBY ([id number]) on 6/14/2022 12:39:22 PM

## 2022-06-19 NOTE — PROGRESS NOTE ADULT - SUBJECTIVE AND OBJECTIVE BOX
Patient is a 72y old  Male who presents with a chief complaint of Rapid A Fib , COVID + (2022 08:16)      HPI:  Pt is a 72 y.o. M with a PMH of HTN, AFIB, BPH, hx of TBI, HLD, CAD, peripheral neuropathy, found to be COVID+ who presented to the ED with CC of fatigue. Pt is not the best historian after having TBI 2/2 fall on AC, so history taken via phone from wife. Pt reports that this morning he woke up "feeling lousy" with a fever and a cough. Pt tested COVID + today. Pt restarted eliquis 2 days ago, and follows up with Dr. Goldsmith for cardiology while outpatient. Pt endorses fever, lethargy diminished sensation on hands and feet 2/2 neuropathy, loss of balance, cough, constipation (last BM yesterday, but before that 1 week prior). Denies CP, palpitations, n/v/d, abdominal pain, leg pain. as per wife pt had some friends who visited pt & pt also has 2 HHA .    In the ED:  T 101.2 F Oral  /90 RR 18 SpO2 98% on RA  WBC 11.44 INR 1.3 Glucose 154 Procal 0.11 COVID +  CT Head Non-Con: Mild to moderate chronic microvascular changes. 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   CXR: Lung Clear.  ECG: AFib RVR  2L NS Bolus x1, 1g IV Tylenol, 1g Rocephin (2022 13:13)      INTERVAL HPI:  6/15/22: Patient seen and examined at bedside. No acute events overnight. Patient on BB CCB w/ rate-controlled HR ~80's. On remdesivir for COVID. saturating well on RA. On eliquis for Afib/DVT. Endorses feeling better without significant dyspnea or cough, but has some fatigue/malaise. ON Remdisivir IVPB daily   22: Patient seen and examined at bedside. No acute events overnight. Rate controlled. HDS. On remdesivir saturating well on RA. Endourses cough but no dyspnea. "I Feel well." S/P IV Remdesivir daily x 3 dose.  22: Patient seen and examined at bedside. No acute events overnight. s/p remdesivir x3d saturating well on RA. Continued cough no dyspnea. "I feel well." rate controlled increased to cardizem 120 ER qd w/ STAT dose today. dc planning -> per , unable to obtain home health aide today, will attempt over weekend ".i want to go home"  22 Pt seen and examined at bedside. Pt states he feels "fine". Pt states he still has a dry cough. Also admits to nasal drip. Pt denies fever, chills, SOB, STACK, CP, palpitations. D/C planning.  22:     OVERNIGHT EVENTS:    Home Medications:  aspirin 81 mg oral tablet: 1 tab(s) orally once a day (2022 13:52)  bethanechol 10 mg oral tablet: 1 tab(s) orally 3 times a day at (8am, 3pm, 10pm) (2022 13:52)  buPROPion 75 mg oral tablet: 1 tab(s) orally 2 times a day (10am, 10pm) (2022 13:52)  Eliquis 2.5 mg oral tablet: 1 tab(s) orally 2 times a day (10am, 10pm)t (2022 13:52)  gabapentin 300 mg oral capsule: 1 cap(s) orally 3 times a day (8am, 3pm, 10pm (2022 13:52)  latanoprost 0.005% ophthalmic solution: 1 drop(s) to each affected eye once a day (in the evening) (2022 13:52)  methylphenidate 10 mg oral tablet: 2 tab(s) orally once a day (in the morning) (2022 13:52)  rosuvastatin 10 mg oral tablet: 1 tab(s) orally once a day (2022 13:52)  tamsulosin 0.4 mg oral capsule: 1 cap(s) orally once a day (in the evening) (2022 13:52)  tiZANidine 4 mg oral tablet: 1 tab(s) orally 3 times a day (2022 13:52)  Vitamin D3 25 mcg (1000 intl units) oral tablet: 1 tab(s) orally once a day (2022 13:52)      MEDICATIONS  (STANDING):  ammonium lactate 12% Lotion 1 Application(s) Topical two times a day  apixaban 2.5 milliGRAM(s) Oral two times a day  aspirin  chewable 81 milliGRAM(s) Oral daily  atorvastatin 40 milliGRAM(s) Oral at bedtime  benzonatate 100 milliGRAM(s) Oral three times a day  bethanechol 10 milliGRAM(s) Oral three times a day  buPROPion XL (24-Hour) . 150 milliGRAM(s) Oral daily  cholecalciferol 1000 Unit(s) Oral daily  diltiazem    milliGRAM(s) Oral daily  fluticasone propionate 50 MICROgram(s)/spray Nasal Spray 1 Spray(s) Both Nostrils two times a day  gabapentin 300 milliGRAM(s) Oral three times a day  latanoprost 0.005% Ophthalmic Solution 1 Drop(s) Both EYES at bedtime  losartan 25 milliGRAM(s) Oral daily  methylphenidate 20 milliGRAM(s) Oral daily  polyethylene glycol 3350 17 Gram(s) Oral two times a day  senna 2 Tablet(s) Oral at bedtime  tamsulosin 0.4 milliGRAM(s) Oral at bedtime  tiZANidine 4 milliGRAM(s) Oral three times a day    MEDICATIONS  (PRN):  acetaminophen     Tablet .. 650 milliGRAM(s) Oral every 6 hours PRN Temp greater or equal to 38C (100.4F), Mild Pain (1 - 3)  aluminum hydroxide/magnesium hydroxide/simethicone Suspension 30 milliLiter(s) Oral every 4 hours PRN Dyspepsia  guaiFENesin Oral Liquid (Sugar-Free) 200 milliGRAM(s) Oral every 6 hours PRN Cough  melatonin 3 milliGRAM(s) Oral at bedtime PRN Insomnia  ondansetron Injectable 4 milliGRAM(s) IV Push every 8 hours PRN Nausea and/or Vomiting      No Known Allergies      Social History:  Vaccinated for COVID 3/3, last shot in february with moderna. Ambulates without assistance- pt is bedbound and wheelchair bound. Pt is reliant on wife for ADLs. Denies alcohol, tobacco, or recreational drug use. (2022 13:13)      REVIEW OF SYSTEMS:  CONSTITUTIONAL: No fever, No chills, No fatigue, No myalgia, No Body ache, ADMITS Weakness  EYES: No eye pain,  No visual disturbances, No discharge, No Redness  ENMT: ADMITS to nasal drip, No ear pain, No nose bleed, No vertigo; No sinus pain, No throat pain, No Congestion  NECK: No pain, No stiffness  RESPIRATORY: ADMITS dry cough, No wheezing, No hemoptysis, No shortness of breath  CARDIOVASCULAR: No chest pain, No palpitations  GASTROINTESTINAL: No abdominal pain, No epigastric pain. No nausea, No vomiting, No diarrhea, No constipation; [ x ] BM  GENITOURINARY: No dysuria, No frequency, No urgency, No hematuria, No incontinence  NEUROLOGICAL: No headaches, No dizziness, No numbness, No tingling, No tremors, No weakness  EXTREMITIES: No Swelling, No Pain, No Edema  SKIN: [ x ] No itching, burning, rashes, or lesions   MUSCULOSKELETAL: No joint pain, No joint swelling; No muscle pain, No back pain, No extremity pain  PSYCHIATRIC: No depression, No anxiety, No mood swings, No difficulty sleeping at night  PAIN SCALE: [ x ] None  [  ] Other-  ROS Unable to obtain due to: [  ] Dementia  [  ] Lethargy  [  ] Sedated  [  ] Non verbal  REST OF REVIEW OF SYSTEMS: [ x ] Normal     Vital Signs Last 24 Hrs  T(C): 36.4 (2022 04:15), Max: 36.5 (2022 21:24)  T(F): 97.6 (2022 04:15), Max: 97.7 (2022 21:24)  HR: 86 (2022 04:15) (71 - 87)  BP: 149/88 (2022 04:15) (126/81 - 159/90)  BP(mean): --  RR: 18 (2022 04:15) (18 - 18)  SpO2: 95% (2022 04:15) (93% - 95%)    CAPILLARY BLOOD GLUCOSE          I&O's Summary    2022 07:01  -  2022 07:00  --------------------------------------------------------  IN: 0 mL / OUT: 1200 mL / NET: -1200 mL      PHYSICAL EXAM:  GENERAL:  [ x ] NAD, [  x] Well appearing, lying back in bed watching TV [  ] Agitated, [  ] Drowsy, [  ] Lethargy, [  ] Confused   HEAD:  [ x ] Normal, [  ] Other  EYES:  [ x ] EOMI, [ x ] PERRLA, [x  ] Conjunctiva and sclera clear normal, [  ] Other, [  ] Pallor, [  ] Discharge  ENMT:  [ x ] Normal, [ x ] Moist mucous membranes, [ x ] Good dentition, [ x ] No thrush  NECK:  [ x ] Supple, [ x ] No JVD, [ x ] Normal thyroid, [  ] Lymphadenopathy, [  ] Other  CHEST/LUNG:  [ x ] Clear to auscultation bilaterally, [ x ] Breath Sounds equal B/L  [  ] Poor effort, [x  ] No rales, [  x] mild rhonchi on right base [ x ] mild expiratory wheeze on right base   HEART:  [ x ] Regular rate, [  ] Tachycardia, [  ] Bradycardia, [ x ] Irregular, [x  ] No murmurs, No rubs, No gallops, [  ] PPM in place (Mfr:  )  ABDOMEN:  [ x ] Soft, [x  ] Nontender, [ x ] Nondistended, [ x ] No mass, [ x ] Bowel sounds present, [  ] Obese  NERVOUS SYSTEM:  [ x ] Alert & Oriented x3, [ x ] Nonfocal, [  ] Confusion, [  ] Encephalopathic, [  ] Sedated, [  ] Unable to assess, [  ] Dementia, [ x ] Other-contractures EXT & Hand B/L   EXTREMITIES:  [ x ] 2+ Peripheral Pulses, No clubbing, No cyanosis,  [  ] Edema B/L lower EXT, [  ] PVD stasis skin changes B/L lower EXT, [  ] Wound [ x ] spasticity of all 4 extremities with prominent contractures of extensors of hands b/l  LYMPH:  No lymphadenopathy noted  SKIN:  [ x ] No rashes or lesions, [  ] Pressure ulcers, [  ] Ecchymosis, [  ] Skin tears, [  ] Other    DIET: Diet, Regular (22 @ 14:17)      LABS:                        13.1   8.16  )-----------( 182      ( 2022 08:32 )             40.2     2022 08:32    145    |  112    |  18     ----------------------------<  116    3.9     |  26     |  0.90     Ca    8.7        2022 08:32    TPro  5.9    /  Alb  3.1    /  TBili  0.3    /  DBili  0.1    /  AST  11     /  ALT  16     /  AlkPhos  88     2022 08:32    PT/INR - ( 2022 08:32 )   PT: 15.1 sec;   INR: 1.29 ratio           Urinalysis Basic - ( 2022 08:11 )    Color: Yellow / Appearance: Clear / S.020 / pH: x  Gluc: x / Ketone: Negative  / Bili: Negative / Urobili: Negative   Blood: x / Protein: 15 / Nitrite: Negative   Leuk Esterase: Trace / RBC: 0-2 /HPF / WBC 0-2   Sq Epi: x / Non Sq Epi: Occasional / Bacteria: x      Culture Results:   10,000 - 49,000 CFU/mL Pseudomonas aeruginosa  <10,000 CFU/ml Normal Urogenital carmen present ( @ 12:29)  Culture Results:   No growth to date. ( @ 16:30)  Culture Results:   No growth to date. ( @ 16:30)    culture blood  -- Clean Catch Clean Catch (Midstream)  @ 12:29    culture urine  --   @ 12:29          Culture - Urine (collected 2022 12:29)  Source: Clean Catch Clean Catch (Midstream)  Preliminary Report (2022 14:36):    10,000 - 49,000 CFU/mL Pseudomonas aeruginosa    <10,000 CFU/ml Normal Urogenital carmen present    Culture - Blood (collected 2022 16:30)  Source: .Blood Blood-Peripheral  Preliminary Report (15 Juan 2022 17:01):    No growth to date.    Culture - Blood (collected 2022 16:30)  Source: .Blood Blood-Peripheral  Preliminary Report (15 Juan 2022 17:01):    No growth to date.       Anemia Panel:  Ferritin, Serum: 113 ng/mL (06-15-22 @ 10:55)      Thyroid Panel:                RADIOLOGY & ADDITIONAL TESTS:      HEALTH ISSUES - PROBLEM Dx:  Atrial fibrillation    2019 novel coronavirus disease (COVID-19)    TBI (traumatic brain injury)    HLD (hyperlipidemia)    CAD (coronary artery disease)    Constipation    BPH (benign prostatic hyperplasia)    Major depression    Need for prophylactic measure    HTN (hypertension)          Consultant(s) Notes Reviewed:  [ x ] YES     Care Discussed with [  ] Consultants, [ x ] Patient, [  x] Family,- wife  [  ] HCP, [x  ] , [  ] Social Service, [x  ] RN, [  ] Physical Therapy, [  ] Palliative Care Team  DVT PPX: [  ] Lovenox, [  ] SC Heparin, [  ] Coumadin, [  ] Xarelto, [ x ] Eliquis, [  ] Pradaxa, [  ] IV Heparin drip, [  ] SCD, [  ] Ambulation, [  ] Contraindicated 2/2 GI Bleed, [  ] Contraindicated 2/2  Bleed, [  ] Contraindicated 2/2 Brain Bleed  Advanced Directive: [ x ] None, [  ] DNR/DNI   Patient is a 72y old  Male who presents with a chief complaint of Rapid A Fib , COVID + (2022 08:16)      HPI:  Pt is a 72 y.o. M with a PMH of HTN, AFIB, BPH, hx of TBI, HLD, CAD, peripheral neuropathy, found to be COVID+ who presented to the ED with CC of fatigue. Pt is not the best historian after having TBI 2/2 fall on AC, so history taken via phone from wife. Pt reports that this morning he woke up "feeling lousy" with a fever and a cough. Pt tested COVID + today. Pt restarted eliquis 2 days ago, and follows up with Dr. Goldsmith for cardiology while outpatient. Pt endorses fever, lethargy diminished sensation on hands and feet 2/2 neuropathy, loss of balance, cough, constipation (last BM yesterday, but before that 1 week prior). Denies CP, palpitations, n/v/d, abdominal pain, leg pain. as per wife pt had some friends who visited pt & pt also has 2 HHA .    In the ED:  T 101.2 F Oral  /90 RR 18 SpO2 98% on RA  WBC 11.44 INR 1.3 Glucose 154 Procal 0.11 COVID +  CT Head Non-Con: Mild to moderate chronic microvascular changes. 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   CXR: Lung Clear.  ECG: AFib RVR  2L NS Bolus x1, 1g IV Tylenol, 1g Rocephin (2022 13:13)      INTERVAL HPI:  6/15/22: Patient seen and examined at bedside. No acute events overnight. Patient on BB CCB w/ rate-controlled HR ~80's. On remdesivir for COVID. saturating well on RA. On eliquis for Afib/DVT. Endorses feeling better without significant dyspnea or cough, but has some fatigue/malaise. ON Remdisivir IVPB daily   22: Patient seen and examined at bedside. No acute events overnight. Rate controlled. HDS. On remdesivir saturating well on RA. Endourses cough but no dyspnea. "I Feel well." S/P IV Remdesivir daily x 3 dose.  22: Patient seen and examined at bedside. No acute events overnight. s/p remdesivir x3d saturating well on RA. Continued cough no dyspnea. "I feel well." rate controlled increased to cardizem 120 ER qd w/ STAT dose today. dc planning -> per , unable to obtain home health aide today, will attempt over weekend ".i want to go home"  22 Pt seen and examined at bedside. Pt states he feels "fine". Pt states he still has a dry cough. Also admits to nasal drip. Pt denies fever, chills, SOB, STACK, CP, palpitations. D/C planning.  22: Patient seen and examined at bedside. No new symptoms at this time feeling "fine." Persistent cough. dc planning today?    OVERNIGHT EVENTS: none    Home Medications:  aspirin 81 mg oral tablet: 1 tab(s) orally once a day (2022 13:52)  bethanechol 10 mg oral tablet: 1 tab(s) orally 3 times a day at (8am, 3pm, 10pm) (2022 13:52)  buPROPion 75 mg oral tablet: 1 tab(s) orally 2 times a day (10am, 10pm) (2022 13:52)  Eliquis 2.5 mg oral tablet: 1 tab(s) orally 2 times a day (10am, 10pm)t (2022 13:52)  gabapentin 300 mg oral capsule: 1 cap(s) orally 3 times a day (8am, 3pm, 10pm (2022 13:52)  latanoprost 0.005% ophthalmic solution: 1 drop(s) to each affected eye once a day (in the evening) (2022 13:52)  methylphenidate 10 mg oral tablet: 2 tab(s) orally once a day (in the morning) (2022 13:52)  rosuvastatin 10 mg oral tablet: 1 tab(s) orally once a day (2022 13:52)  tamsulosin 0.4 mg oral capsule: 1 cap(s) orally once a day (in the evening) (2022 13:52)  tiZANidine 4 mg oral tablet: 1 tab(s) orally 3 times a day (2022 13:52)  Vitamin D3 25 mcg (1000 intl units) oral tablet: 1 tab(s) orally once a day (2022 13:52)      MEDICATIONS  (STANDING):  ammonium lactate 12% Lotion 1 Application(s) Topical two times a day  apixaban 2.5 milliGRAM(s) Oral two times a day  aspirin  chewable 81 milliGRAM(s) Oral daily  atorvastatin 40 milliGRAM(s) Oral at bedtime  benzonatate 100 milliGRAM(s) Oral three times a day  bethanechol 10 milliGRAM(s) Oral three times a day  buPROPion XL (24-Hour) . 150 milliGRAM(s) Oral daily  cholecalciferol 1000 Unit(s) Oral daily  diltiazem    milliGRAM(s) Oral daily  fluticasone propionate 50 MICROgram(s)/spray Nasal Spray 1 Spray(s) Both Nostrils two times a day  gabapentin 300 milliGRAM(s) Oral three times a day  latanoprost 0.005% Ophthalmic Solution 1 Drop(s) Both EYES at bedtime  losartan 25 milliGRAM(s) Oral daily  methylphenidate 20 milliGRAM(s) Oral daily  polyethylene glycol 3350 17 Gram(s) Oral two times a day  senna 2 Tablet(s) Oral at bedtime  tamsulosin 0.4 milliGRAM(s) Oral at bedtime  tiZANidine 4 milliGRAM(s) Oral three times a day    MEDICATIONS  (PRN):  acetaminophen     Tablet .. 650 milliGRAM(s) Oral every 6 hours PRN Temp greater or equal to 38C (100.4F), Mild Pain (1 - 3)  aluminum hydroxide/magnesium hydroxide/simethicone Suspension 30 milliLiter(s) Oral every 4 hours PRN Dyspepsia  guaiFENesin Oral Liquid (Sugar-Free) 200 milliGRAM(s) Oral every 6 hours PRN Cough  melatonin 3 milliGRAM(s) Oral at bedtime PRN Insomnia  ondansetron Injectable 4 milliGRAM(s) IV Push every 8 hours PRN Nausea and/or Vomiting      No Known Allergies      Social History:  Vaccinated for COVID 3/3, last shot in february with moderna. Ambulates without assistance- pt is bedbound and wheelchair bound. Pt is reliant on wife for ADLs. Denies alcohol, tobacco, or recreational drug use. (2022 13:13)      REVIEW OF SYSTEMS:  CONSTITUTIONAL: No fever, No chills, No fatigue, No myalgia, No Body ache, No Weakness  EYES: No eye pain,  No visual disturbances, No discharge, No Redness  ENMT: ADMITS to nasal drip, No ear pain, No nose bleed, No vertigo; No sinus pain, No throat pain, No Congestion  NECK: No pain, No stiffness  RESPIRATORY: ADMITS dry cough, No wheezing, No hemoptysis, No shortness of breath  CARDIOVASCULAR: No chest pain, No palpitations  GASTROINTESTINAL: No abdominal pain, No epigastric pain. No nausea, No vomiting, No diarrhea, No constipation; [ x ] BM  GENITOURINARY: No dysuria, No frequency, No urgency, No hematuria, No incontinence  NEUROLOGICAL: No headaches, No dizziness, No numbness, No tingling, No tremors, No weakness  EXTREMITIES: No Swelling, No Pain, No Edema  SKIN: [ x ] No itching, burning, rashes, or lesions   MUSCULOSKELETAL: No joint pain, No joint swelling; No muscle pain, No back pain, No extremity pain  PSYCHIATRIC: No depression, No anxiety, No mood swings, No difficulty sleeping at night  PAIN SCALE: [ x ] None  [  ] Other-  ROS Unable to obtain due to: [  ] Dementia  [  ] Lethargy  [  ] Sedated  [  ] Non verbal  REST OF REVIEW OF SYSTEMS: [ x ] Normal     Vital Signs Last 24 Hrs  T(C): 36.4 (2022 04:15), Max: 36.5 (2022 21:24)  T(F): 97.6 (2022 04:15), Max: 97.7 (2022 21:24)  HR: 86 (2022 04:15) (71 - 87)  BP: 149/88 (2022 04:15) (126/81 - 159/90)  BP(mean): --  RR: 18 (2022 04:15) (18 - 18)  SpO2: 95% (2022 04:15) (93% - 95%)    CAPILLARY BLOOD GLUCOSE          I&O's Summary    2022 07:01  -  2022 07:00  --------------------------------------------------------  IN: 0 mL / OUT: 1200 mL / NET: -1200 mL      PHYSICAL EXAM:  GENERAL:  [ x ] NAD, [  x] Well appearing, lying back in bed watching TV [  ] Agitated, [  ] Drowsy, [  ] Lethargy, [  ] Confused   HEAD:  [ x ] Normal, [  ] Other  EYES:  [ x ] EOMI, [ x ] PERRLA, [x  ] Conjunctiva and sclera clear normal, [  ] Other, [  ] Pallor, [  ] Discharge  ENMT:  [ x ] Normal, [ x ] Moist mucous membranes, [ x ] Good dentition, [ x ] No thrush  NECK:  [ x ] Supple, [ x ] No JVD, [ x ] Normal thyroid, [  ] Lymphadenopathy, [  ] Other  CHEST/LUNG:  [ x ] Clear to auscultation bilaterally, [ x ] Breath Sounds equal B/L  [  ] Poor effort, [x  ] No rales, [  x] mild rhonchi on right base [ x ] mild expiratory wheeze on right base   HEART:  [ x ] Regular rate, [  ] Tachycardia, [  ] Bradycardia, [ x ] Irregular, [x  ] No murmurs, No rubs, No gallops, [  ] PPM in place (Mfr:  )  ABDOMEN:  [ x ] Soft, [x  ] Nontender, [ x ] Nondistended, [ x ] No mass, [ x ] Bowel sounds present, [  ] Obese  NERVOUS SYSTEM:  [ x ] Alert & Oriented x3, [ x ] Nonfocal, [  ] Confusion, [  ] Encephalopathic, [  ] Sedated, [  ] Unable to assess, [  ] Dementia, [ x ] Other-contractures EXT & Hand B/L   EXTREMITIES:  [ x ] 2+ Peripheral Pulses, No clubbing, No cyanosis,  [  ] Edema B/L lower EXT, [  ] PVD stasis skin changes B/L lower EXT, [  ] Wound [ x ] spasticity of all 4 extremities with prominent contractures of extensors of hands b/l  LYMPH:  No lymphadenopathy noted  SKIN:  [ x ] No rashes or lesions, [  ] Pressure ulcers, [  ] Ecchymosis, [  ] Skin tears, [  ] Other    DIET: Diet, Regular (22 @ 14:17)      LABS:                        13.1   8.16  )-----------( 182      ( 2022 08:32 )             40.2     2022 08:32    145    |  112    |  18     ----------------------------<  116    3.9     |  26     |  0.90     Ca    8.7        2022 08:32    TPro  5.9    /  Alb  3.1    /  TBili  0.3    /  DBili  0.1    /  AST  11     /  ALT  16     /  AlkPhos  88     2022 08:32    PT/INR - ( 2022 08:32 )   PT: 15.1 sec;   INR: 1.29 ratio           Urinalysis Basic - ( 2022 08:11 )    Color: Yellow / Appearance: Clear / S.020 / pH: x  Gluc: x / Ketone: Negative  / Bili: Negative / Urobili: Negative   Blood: x / Protein: 15 / Nitrite: Negative   Leuk Esterase: Trace / RBC: 0-2 /HPF / WBC 0-2   Sq Epi: x / Non Sq Epi: Occasional / Bacteria: x      Culture Results:   10,000 - 49,000 CFU/mL Pseudomonas aeruginosa  <10,000 CFU/ml Normal Urogenital carmen present ( @ 12:29)  Culture Results:   No growth to date. ( @ 16:30)  Culture Results:   No growth to date. ( @ 16:30)    culture blood  -- Clean Catch Clean Catch (Midstream)  @ 12:29    culture urine  --   @ 12:29          Culture - Urine (collected 2022 12:29)  Source: Clean Catch Clean Catch (Midstream)  Preliminary Report (2022 14:36):    10,000 - 49,000 CFU/mL Pseudomonas aeruginosa    <10,000 CFU/ml Normal Urogenital carmen present    Culture - Blood (collected 2022 16:30)  Source: .Blood Blood-Peripheral  Preliminary Report (15 Juan 2022 17:01):    No growth to date.    Culture - Blood (collected 2022 16:30)  Source: .Blood Blood-Peripheral  Preliminary Report (15 Juan 2022 17:01):    No growth to date.       Anemia Panel:  Ferritin, Serum: 113 ng/mL (06-15-22 @ 10:55)      Thyroid Panel:                RADIOLOGY & ADDITIONAL TESTS:      HEALTH ISSUES - PROBLEM Dx:  Atrial fibrillation    2019 novel coronavirus disease (COVID-19)    TBI (traumatic brain injury)    HLD (hyperlipidemia)    CAD (coronary artery disease)    Constipation    BPH (benign prostatic hyperplasia)    Major depression    Need for prophylactic measure    HTN (hypertension)          Consultant(s) Notes Reviewed:  [ x ] YES     Care Discussed with [  ] Consultants, [ x ] Patient, [  x ] Family,- wife  [  ] HCP, [x  ] , [  ] Social Service, [x  ] RN, [  ] Physical Therapy, [  ] Palliative Care Team  DVT PPX: [  ] Lovenox, [  ] SC Heparin, [  ] Coumadin, [  ] Xarelto, [ x ] Eliquis, [  ] Pradaxa, [  ] IV Heparin drip, [  ] SCD, [  ] Ambulation, [  ] Contraindicated 2/2 GI Bleed, [  ] Contraindicated 2/2  Bleed, [  ] Contraindicated 2/2 Brain Bleed  Advanced Directive: [ x ] None, [  ] DNR/DNI   Patient is a 72y old  Male who presents with a chief complaint of Rapid A Fib , COVID + (2022 08:16)      HPI:  Pt is a 72 y.o. M with a PMH of HTN, AFIB, BPH, hx of TBI, HLD, CAD, peripheral neuropathy, found to be COVID+ who presented to the ED with CC of fatigue. Pt is not the best historian after having TBI 2/2 fall on AC, so history taken via phone from wife. Pt reports that this morning he woke up "feeling lousy" with a fever and a cough. Pt tested COVID + today. Pt restarted eliquis 2 days ago, and follows up with Dr. Goldsmith for cardiology while outpatient. Pt endorses fever, lethargy diminished sensation on hands and feet 2/2 neuropathy, loss of balance, cough, constipation (last BM yesterday, but before that 1 week prior). Denies CP, palpitations, n/v/d, abdominal pain, leg pain. as per wife pt had some friends who visited pt & pt also has 2 HHA .    In the ED:  T 101.2 F Oral  /90 RR 18 SpO2 98% on RA  WBC 11.44 INR 1.3 Glucose 154 Procal 0.11 COVID +  CT Head Non-Con: Mild to moderate chronic microvascular changes. 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   CXR: Lung Clear.  ECG: AFib RVR  2L NS Bolus x1, 1g IV Tylenol, 1g Rocephin (2022 13:13)      INTERVAL HPI:  6/15/22: Patient seen and examined at bedside. No acute events overnight. Patient on BB CCB w/ rate-controlled HR ~80's. On remdesivir for COVID. saturating well on RA. On eliquis for Afib/DVT. Endorses feeling better without significant dyspnea or cough, but has some fatigue/malaise. ON Remdisivir IVPB daily   22: Patient seen and examined at bedside. No acute events overnight. Rate controlled. HDS. On remdesivir saturating well on RA. Endourses cough but no dyspnea. "I Feel well." S/P IV Remdesivir daily x 3 dose.  22: Patient seen and examined at bedside. No acute events overnight. s/p remdesivir x3d saturating well on RA. Continued cough no dyspnea. "I feel well." rate controlled increased to cardizem 120 ER qd w/ STAT dose today. dc planning -> per , unable to obtain home health aide today, will attempt over weekend ".i want to go home"  22 Pt seen and examined at bedside. Pt states he feels "fine". Pt states he still has a dry cough. Also admits to nasal drip. Pt denies fever, chills, SOB, STACK, CP, palpitations. D/C planning.  22: Patient seen and examined at bedside. No new symptoms at this time feeling "fine." Persistent cough. dc planning today?    OVERNIGHT EVENTS: none    Home Medications:  aspirin 81 mg oral tablet: 1 tab(s) orally once a day (2022 13:52)  bethanechol 10 mg oral tablet: 1 tab(s) orally 3 times a day at (8am, 3pm, 10pm) (2022 13:52)  buPROPion 75 mg oral tablet: 1 tab(s) orally 2 times a day (10am, 10pm) (2022 13:52)  Eliquis 2.5 mg oral tablet: 1 tab(s) orally 2 times a day (10am, 10pm)t (2022 13:52)  gabapentin 300 mg oral capsule: 1 cap(s) orally 3 times a day (8am, 3pm, 10pm (2022 13:52)  latanoprost 0.005% ophthalmic solution: 1 drop(s) to each affected eye once a day (in the evening) (2022 13:52)  methylphenidate 10 mg oral tablet: 2 tab(s) orally once a day (in the morning) (2022 13:52)  rosuvastatin 10 mg oral tablet: 1 tab(s) orally once a day (2022 13:52)  tamsulosin 0.4 mg oral capsule: 1 cap(s) orally once a day (in the evening) (2022 13:52)  tiZANidine 4 mg oral tablet: 1 tab(s) orally 3 times a day (2022 13:52)  Vitamin D3 25 mcg (1000 intl units) oral tablet: 1 tab(s) orally once a day (2022 13:52)      MEDICATIONS  (STANDING):  ammonium lactate 12% Lotion 1 Application(s) Topical two times a day  apixaban 2.5 milliGRAM(s) Oral two times a day  aspirin  chewable 81 milliGRAM(s) Oral daily  atorvastatin 40 milliGRAM(s) Oral at bedtime  benzonatate 100 milliGRAM(s) Oral three times a day  bethanechol 10 milliGRAM(s) Oral three times a day  buPROPion XL (24-Hour) . 150 milliGRAM(s) Oral daily  cholecalciferol 1000 Unit(s) Oral daily  diltiazem    milliGRAM(s) Oral daily  fluticasone propionate 50 MICROgram(s)/spray Nasal Spray 1 Spray(s) Both Nostrils two times a day  gabapentin 300 milliGRAM(s) Oral three times a day  latanoprost 0.005% Ophthalmic Solution 1 Drop(s) Both EYES at bedtime  losartan 25 milliGRAM(s) Oral daily  methylphenidate 20 milliGRAM(s) Oral daily  polyethylene glycol 3350 17 Gram(s) Oral two times a day  senna 2 Tablet(s) Oral at bedtime  tamsulosin 0.4 milliGRAM(s) Oral at bedtime  tiZANidine 4 milliGRAM(s) Oral three times a day    MEDICATIONS  (PRN):  acetaminophen     Tablet .. 650 milliGRAM(s) Oral every 6 hours PRN Temp greater or equal to 38C (100.4F), Mild Pain (1 - 3)  aluminum hydroxide/magnesium hydroxide/simethicone Suspension 30 milliLiter(s) Oral every 4 hours PRN Dyspepsia  guaiFENesin Oral Liquid (Sugar-Free) 200 milliGRAM(s) Oral every 6 hours PRN Cough  melatonin 3 milliGRAM(s) Oral at bedtime PRN Insomnia  ondansetron Injectable 4 milliGRAM(s) IV Push every 8 hours PRN Nausea and/or Vomiting      No Known Allergies      Social History:  Vaccinated for COVID 3/3, last shot in february with moderna. Ambulates without assistance- pt is bedbound and wheelchair bound. Pt is reliant on wife for ADLs. Denies alcohol, tobacco, or recreational drug use. (2022 13:13)      REVIEW OF SYSTEMS:i am OK  CONSTITUTIONAL: No fever, No chills, No fatigue, No myalgia, No Body ache, No Weakness  EYES: No eye pain,  No visual disturbances, No discharge, No Redness  ENMT: ADMITS to nasal drip, No ear pain, No nose bleed, No vertigo; No sinus pain, No throat pain, No Congestion  NECK: No pain, No stiffness  RESPIRATORY: ADMITS dry cough, No wheezing, No hemoptysis, No shortness of breath  CARDIOVASCULAR: No chest pain, No palpitations  GASTROINTESTINAL: No abdominal pain, No epigastric pain. No nausea, No vomiting, No diarrhea, No constipation; [ x ] BM  GENITOURINARY: No dysuria, No frequency, No urgency, No hematuria, No incontinence  NEUROLOGICAL: No headaches, No dizziness, No numbness, No tingling, No tremors, No weakness  EXTREMITIES: No Swelling, No Pain, No Edema  SKIN: [ x ] No itching, burning, rashes, or lesions   MUSCULOSKELETAL: No joint pain, No joint swelling; No muscle pain, No back pain, No extremity pain  PSYCHIATRIC: No depression, No anxiety, No mood swings, No difficulty sleeping at night  PAIN SCALE: [ x ] None  [  ] Other-  ROS Unable to obtain due to: [  ] Dementia  [  ] Lethargy  [  ] Sedated  [  ] Non verbal  REST OF REVIEW OF SYSTEMS: [ x ] Normal     Vital Signs Last 24 Hrs  T(C): 36.4 (2022 04:15), Max: 36.5 (2022 21:24)  T(F): 97.6 (2022 04:15), Max: 97.7 (2022 21:24)  HR: 86 (2022 04:15) (71 - 87)  BP: 149/88 (2022 04:15) (126/81 - 159/90)  BP(mean): --  RR: 18 (2022 04:15) (18 - 18)  SpO2: 95% (2022 04:15) (93% - 95%)    CAPILLARY BLOOD GLUCOSE          I&O's Summary    2022 07:01  -  2022 07:00  --------------------------------------------------------  IN: 0 mL / OUT: 1200 mL / NET: -1200 mL      PHYSICAL EXAM:  GENERAL:  [ x ] NAD, [  x] Well appearing, lying back in bed watching TV [  ] Agitated, [  ] Drowsy, [  ] Lethargy, [  ] Confused   HEAD:  [ x ] Normal, [  ] Other  EYES:  [ x ] EOMI, [ x ] PERRLA, [x  ] Conjunctiva and sclera clear normal, [  ] Other, [  ] Pallor, [  ] Discharge  ENMT:  [ x ] Normal, [ x ] Moist mucous membranes, [ x ] Good dentition, [ x ] No thrush  NECK:  [ x ] Supple, [ x ] No JVD, [ x ] Normal thyroid, [  ] Lymphadenopathy, [  ] Other  CHEST/LUNG:  [ x ] Clear to auscultation bilaterally, [ x ] Breath Sounds equal B/L  [  ] Poor effort, [x  ] No rales, [  x] mild rhonchi on right base [ x ] mild expiratory wheeze on right base   HEART:  [ x ] Regular rate, [  ] Tachycardia, [  ] Bradycardia, [ x ] Irregular, [x  ] No murmurs, No rubs, No gallops, [  ] PPM in place (Mfr:  )  ABDOMEN:  [ x ] Soft, [x  ] Nontender, [ x ] Nondistended, [ x ] No mass, [ x ] Bowel sounds present, [  ] Obese  NERVOUS SYSTEM:  [ x ] Alert & Oriented x3, [ x ] Nonfocal, [  ] Confusion, [  ] Encephalopathic, [  ] Sedated, [  ] Unable to assess, [  ] Dementia, [ x ] Other-contractures EXT & Hand B/L   EXTREMITIES:  [ x ] 2+ Peripheral Pulses, No clubbing, No cyanosis,  [  ] Edema B/L lower EXT, [  ] PVD stasis skin changes B/L lower EXT, [  ] Wound [ x ] spasticity of all 4 extremities with prominent contractures of extensors of hands b/l  LYMPH:  No lymphadenopathy noted  SKIN:  [ x ] No rashes or lesions, [  ] Pressure ulcers, [  ] Ecchymosis, [  ] Skin tears, [  ] Other    DIET: Diet, Regular (22 @ 14:17)      LABS:                        13.1   8.16  )-----------( 182      ( 2022 08:32 )             40.2     2022 08:32    145    |  112    |  18     ----------------------------<  116    3.9     |  26     |  0.90     Ca    8.7        2022 08:32    TPro  5.9    /  Alb  3.1    /  TBili  0.3    /  DBili  0.1    /  AST  11     /  ALT  16     /  AlkPhos  88     2022 08:32    PT/INR - ( 2022 08:32 )   PT: 15.1 sec;   INR: 1.29 ratio           Urinalysis Basic - ( 2022 08:11 )    Color: Yellow / Appearance: Clear / S.020 / pH: x  Gluc: x / Ketone: Negative  / Bili: Negative / Urobili: Negative   Blood: x / Protein: 15 / Nitrite: Negative   Leuk Esterase: Trace / RBC: 0-2 /HPF / WBC 0-2   Sq Epi: x / Non Sq Epi: Occasional / Bacteria: x      Culture Results:   10,000 - 49,000 CFU/mL Pseudomonas aeruginosa  <10,000 CFU/ml Normal Urogenital carmen present ( @ 12:29)  Culture Results:   No growth to date. ( @ 16:30)  Culture Results:   No growth to date. ( @ 16:30)    culture blood  -- Clean Catch Clean Catch (Midstream)  @ 12:29    culture urine  --   @ 12:29          Culture - Urine (collected 2022 12:29)  Source: Clean Catch Clean Catch (Midstream)  Preliminary Report (2022 14:36):    10,000 - 49,000 CFU/mL Pseudomonas aeruginosa    <10,000 CFU/ml Normal Urogenital carmen present    Culture - Blood (collected 2022 16:30)  Source: .Blood Blood-Peripheral  Preliminary Report (15 Juan 2022 17:01):    No growth to date.    Culture - Blood (collected 2022 16:30)  Source: .Blood Blood-Peripheral  Preliminary Report (15 Juan 2022 17:01):    No growth to date.       Anemia Panel:  Ferritin, Serum: 113 ng/mL (06-15-22 @ 10:55)      Thyroid Panel:                RADIOLOGY & ADDITIONAL TESTS:      HEALTH ISSUES - PROBLEM Dx:  Atrial fibrillation    2019 novel coronavirus disease (COVID-19)    TBI (traumatic brain injury)    HLD (hyperlipidemia)    CAD (coronary artery disease)    Constipation    BPH (benign prostatic hyperplasia)    Major depression    Need for prophylactic measure    HTN (hypertension)          Consultant(s) Notes Reviewed:  [ x ] YES     Care Discussed with [  ] Consultants, [ x ] Patient, [  x ] Family,- wife  [  ] HCP, [x  ] , [  ] Social Service, [x  ] RN, [  ] Physical Therapy, [  ] Palliative Care Team  DVT PPX: [  ] Lovenox, [  ] SC Heparin, [  ] Coumadin, [  ] Xarelto, [ x ] Eliquis, [  ] Pradaxa, [  ] IV Heparin drip, [  ] SCD, [  ] Ambulation, [  ] Contraindicated 2/2 GI Bleed, [  ] Contraindicated 2/2  Bleed, [  ] Contraindicated 2/2 Brain Bleed  Advanced Directive: [ x ] None, [  ] DNR/DNI

## 2022-06-19 NOTE — PROGRESS NOTE ADULT - PROBLEM SELECTOR PLAN 1
likely 2/2 fever 2/2 COVID - rate-controlled - improved  on CCB,BB - on AC  - D/C home diltiazem 300mg/24hrs.  - Restart Cardizem  qd w/ hold parameters  -Losartan 25 mg 1 tab daily.  - DC lopressor for soft BP, rates well controlled  - DC hydralazine given soft bp  - continue continuous tele monitoring  - No meaningful evidence of volume overload.  - Previous TTE shows EF 60-65% on 7/2020  - TTE -> mild L ventricular hypertrophy w/ LVEF 60%  - Continue ASA 81mg and Rosuvastatin 10mg   - Eplerenone on hold  cardio following -DR Edwards /DR MARTÍNEZ Goldsmith d/w about BP  Meds  -plan to dc Monday, long term care agency  rep Elida placed order to reinstate home health aids for Monday. Family had difficulty getting private hire. likely 2/2 fever 2/2 COVID - rate-controlled - improved  on CCB,BB - on AC  - D/C home diltiazem 300mg/24hrs.  - c/w Cardizem  qd w/ hold parameters -> a 30 day prescription was written to your local pharm  - c/w Losartan 25 mg 1 tab qd -> a 30 day prescription was written to your local pharm  - DC lopressor for soft BP, rates well controlled  - DC hydralazine given soft bp  - continue continuous tele monitoring  - No meaningful evidence of volume overload.  - Previous TTE shows EF 60-65% on 7/2020  - TTE -> mild L ventricular hypertrophy w/ LVEF 60%  - Continue ASA 81mg and Rosuvastatin 10mg   - Eplerenone on hold  cardio following - Dr Edwards /DR MARTÍNEZ Goldsmith d/w about BP  Meds  -plan to dc long term care agency  rep Elida placed order to reinstate home health aids for Monday. Family had difficulty getting private hire, however might be able to today.

## 2022-06-19 NOTE — PROGRESS NOTE ADULT - PROBLEM SELECTOR PLAN 2
COVID (+) - saturating well on RA  - c/w tylenol 650 mg po q6h prn fever  - dry cough: added tessalon perles, continue Robitussin   -nasal drip: started Flonase   - c/w albuterol q6h prn  - c/w home Eliquis mg BID  - c/w remdesivir s/p 3 doses completed   - No steroids at this time  pulm following - Hao - will appreciate recs  ID - Nigel-robert for d/c COVID (+) - saturating well on RA  - c/w tylenol 650 mg po q6h prn fever  - dry cough: added tessalon perles, continue Robitussin   - nasal drip: started Flonase   - c/w albuterol q6h prn  - c/w home Eliquis mg BID  - s/p remdesivir 3 doses completed   - No steroids at this time  pulm cruzito - Hao - will appreciate recs  ID - Nigel-robert for d/c

## 2022-06-19 NOTE — PROGRESS NOTE ADULT - SUBJECTIVE AND OBJECTIVE BOX
Date/Time Patient Seen:  		  Referring MD:   Data Reviewed	       Patient is a 72y old  Male who presents with a chief complaint of Rapid A Fib , COVID + (18 Jun 2022 08:16)      Subjective/HPI     PAST MEDICAL & SURGICAL HISTORY:  TBI (traumatic brain injury)    HTN (hypertension)    Atrial fibrillation    DM (diabetes mellitus)    Peripheral neuropathy    Major depression    HLD (hyperlipidemia)    CAD (coronary artery disease)    BPH (benign prostatic hyperplasia)    No significant past surgical history          Medication list         MEDICATIONS  (STANDING):  ammonium lactate 12% Lotion 1 Application(s) Topical two times a day  apixaban 2.5 milliGRAM(s) Oral two times a day  aspirin  chewable 81 milliGRAM(s) Oral daily  atorvastatin 40 milliGRAM(s) Oral at bedtime  benzonatate 100 milliGRAM(s) Oral three times a day  bethanechol 10 milliGRAM(s) Oral three times a day  buPROPion XL (24-Hour) . 150 milliGRAM(s) Oral daily  cholecalciferol 1000 Unit(s) Oral daily  diltiazem    milliGRAM(s) Oral daily  fluticasone propionate 50 MICROgram(s)/spray Nasal Spray 1 Spray(s) Both Nostrils two times a day  gabapentin 300 milliGRAM(s) Oral three times a day  latanoprost 0.005% Ophthalmic Solution 1 Drop(s) Both EYES at bedtime  losartan 25 milliGRAM(s) Oral daily  methylphenidate 20 milliGRAM(s) Oral daily  polyethylene glycol 3350 17 Gram(s) Oral two times a day  senna 2 Tablet(s) Oral at bedtime  tamsulosin 0.4 milliGRAM(s) Oral at bedtime  tiZANidine 4 milliGRAM(s) Oral three times a day    MEDICATIONS  (PRN):  acetaminophen     Tablet .. 650 milliGRAM(s) Oral every 6 hours PRN Temp greater or equal to 38C (100.4F), Mild Pain (1 - 3)  aluminum hydroxide/magnesium hydroxide/simethicone Suspension 30 milliLiter(s) Oral every 4 hours PRN Dyspepsia  guaiFENesin Oral Liquid (Sugar-Free) 200 milliGRAM(s) Oral every 6 hours PRN Cough  melatonin 3 milliGRAM(s) Oral at bedtime PRN Insomnia  ondansetron Injectable 4 milliGRAM(s) IV Push every 8 hours PRN Nausea and/or Vomiting         Vitals log        ICU Vital Signs Last 24 Hrs  T(C): 36.4 (19 Jun 2022 04:15), Max: 36.5 (18 Jun 2022 21:24)  T(F): 97.6 (19 Jun 2022 04:15), Max: 97.7 (18 Jun 2022 21:24)  HR: 86 (19 Jun 2022 04:15) (71 - 87)  BP: 149/88 (19 Jun 2022 04:15) (126/81 - 159/90)  BP(mean): --  ABP: --  ABP(mean): --  RR: 18 (19 Jun 2022 04:15) (18 - 18)  SpO2: 95% (19 Jun 2022 04:15) (93% - 95%)           Input and Output:  I&O's Detail    18 Jun 2022 07:01  -  19 Jun 2022 07:00  --------------------------------------------------------  IN:  Total IN: 0 mL    OUT:    Incontinent per Condom Catheter (mL): 1200 mL    Stool (mL): 0 mL  Total OUT: 1200 mL    Total NET: -1200 mL          Lab Data                        13.1   8.16  )-----------( 182      ( 18 Jun 2022 08:32 )             40.2     06-18    145  |  112<H>  |  18  ----------------------------<  116<H>  3.9   |  26  |  0.90    Ca    8.7      18 Jun 2022 08:32    TPro  5.9<L>  /  Alb  3.1<L>  /  TBili  0.3  /  DBili  0.1  /  AST  11<L>  /  ALT  16  /  AlkPhos  88  06-18            Review of Systems	      Objective     Physical Examination    heart s1s2  lung dec BS  abd soft      Pertinent Lab findings & Imaging      Geraldo:  NO   Adequate UO     I&O's Detail    18 Jun 2022 07:01  -  19 Jun 2022 07:00  --------------------------------------------------------  IN:  Total IN: 0 mL    OUT:    Incontinent per Condom Catheter (mL): 1200 mL    Stool (mL): 0 mL  Total OUT: 1200 mL    Total NET: -1200 mL               Discussed with:     Cultures:	        Radiology

## 2022-06-20 DIAGNOSIS — N39.0 URINARY TRACT INFECTION, SITE NOT SPECIFIED: ICD-10-CM

## 2022-06-20 DIAGNOSIS — R82.71 BACTERIURIA: ICD-10-CM

## 2022-06-20 LAB
ALBUMIN SERPL ELPH-MCNC: 2.8 G/DL — LOW (ref 3.3–5)
ALBUMIN SERPL ELPH-MCNC: 2.9 G/DL — LOW (ref 3.3–5)
ALP SERPL-CCNC: 100 U/L — SIGNIFICANT CHANGE UP (ref 40–120)
ALP SERPL-CCNC: 98 U/L — SIGNIFICANT CHANGE UP (ref 40–120)
ALT FLD-CCNC: 16 U/L — SIGNIFICANT CHANGE UP (ref 12–78)
ALT FLD-CCNC: 17 U/L — SIGNIFICANT CHANGE UP (ref 12–78)
ANION GAP SERPL CALC-SCNC: 10 MMOL/L — SIGNIFICANT CHANGE UP (ref 5–17)
AST SERPL-CCNC: 11 U/L — LOW (ref 15–37)
AST SERPL-CCNC: 11 U/L — LOW (ref 15–37)
BILIRUB DIRECT SERPL-MCNC: 0.1 MG/DL — SIGNIFICANT CHANGE UP (ref 0–0.3)
BILIRUB INDIRECT FLD-MCNC: 0.2 MG/DL — SIGNIFICANT CHANGE UP (ref 0.2–1)
BILIRUB SERPL-MCNC: 0.3 MG/DL — SIGNIFICANT CHANGE UP (ref 0.2–1.2)
BILIRUB SERPL-MCNC: 0.3 MG/DL — SIGNIFICANT CHANGE UP (ref 0.2–1.2)
BUN SERPL-MCNC: 13 MG/DL — SIGNIFICANT CHANGE UP (ref 7–23)
CALCIUM SERPL-MCNC: 8.6 MG/DL — SIGNIFICANT CHANGE UP (ref 8.5–10.1)
CHLORIDE SERPL-SCNC: 112 MMOL/L — HIGH (ref 96–108)
CO2 SERPL-SCNC: 25 MMOL/L — SIGNIFICANT CHANGE UP (ref 22–31)
CREAT SERPL-MCNC: 0.94 MG/DL — SIGNIFICANT CHANGE UP (ref 0.5–1.3)
EGFR: 87 ML/MIN/1.73M2 — SIGNIFICANT CHANGE UP
GLUCOSE SERPL-MCNC: 114 MG/DL — HIGH (ref 70–99)
HCT VFR BLD CALC: 39.9 % — SIGNIFICANT CHANGE UP (ref 39–50)
HGB BLD-MCNC: 12.7 G/DL — LOW (ref 13–17)
INR BLD: 1.45 RATIO — HIGH (ref 0.88–1.16)
MCHC RBC-ENTMCNC: 26.2 PG — LOW (ref 27–34)
MCHC RBC-ENTMCNC: 31.8 GM/DL — LOW (ref 32–36)
MCV RBC AUTO: 82.4 FL — SIGNIFICANT CHANGE UP (ref 80–100)
NRBC # BLD: 0 /100 WBCS — SIGNIFICANT CHANGE UP (ref 0–0)
PLATELET # BLD AUTO: 191 K/UL — SIGNIFICANT CHANGE UP (ref 150–400)
POTASSIUM SERPL-MCNC: 3.6 MMOL/L — SIGNIFICANT CHANGE UP (ref 3.5–5.3)
POTASSIUM SERPL-SCNC: 3.6 MMOL/L — SIGNIFICANT CHANGE UP (ref 3.5–5.3)
PROT SERPL-MCNC: 5.8 G/DL — LOW (ref 6–8.3)
PROT SERPL-MCNC: 5.8 G/DL — LOW (ref 6–8.3)
PROTHROM AB SERPL-ACNC: 17 SEC — HIGH (ref 10.5–13.4)
RBC # BLD: 4.84 M/UL — SIGNIFICANT CHANGE UP (ref 4.2–5.8)
RBC # FLD: 14.5 % — SIGNIFICANT CHANGE UP (ref 10.3–14.5)
SODIUM SERPL-SCNC: 147 MMOL/L — HIGH (ref 135–145)
WBC # BLD: 9.17 K/UL — SIGNIFICANT CHANGE UP (ref 3.8–10.5)
WBC # FLD AUTO: 9.17 K/UL — SIGNIFICANT CHANGE UP (ref 3.8–10.5)

## 2022-06-20 PROCEDURE — 99232 SBSQ HOSP IP/OBS MODERATE 35: CPT

## 2022-06-20 RX ADMIN — GABAPENTIN 300 MILLIGRAM(S): 400 CAPSULE ORAL at 14:15

## 2022-06-20 RX ADMIN — GABAPENTIN 300 MILLIGRAM(S): 400 CAPSULE ORAL at 05:53

## 2022-06-20 RX ADMIN — Medication 100 MILLIGRAM(S): at 05:54

## 2022-06-20 RX ADMIN — LOSARTAN POTASSIUM 25 MILLIGRAM(S): 100 TABLET, FILM COATED ORAL at 05:54

## 2022-06-20 RX ADMIN — Medication 81 MILLIGRAM(S): at 11:55

## 2022-06-20 RX ADMIN — GABAPENTIN 300 MILLIGRAM(S): 400 CAPSULE ORAL at 22:36

## 2022-06-20 RX ADMIN — BUPROPION HYDROCHLORIDE 150 MILLIGRAM(S): 150 TABLET, EXTENDED RELEASE ORAL at 11:55

## 2022-06-20 RX ADMIN — Medication 1 APPLICATION(S): at 17:15

## 2022-06-20 RX ADMIN — POLYETHYLENE GLYCOL 3350 17 GRAM(S): 17 POWDER, FOR SOLUTION ORAL at 05:54

## 2022-06-20 RX ADMIN — POLYETHYLENE GLYCOL 3350 17 GRAM(S): 17 POWDER, FOR SOLUTION ORAL at 17:15

## 2022-06-20 RX ADMIN — TIZANIDINE 4 MILLIGRAM(S): 4 TABLET ORAL at 05:53

## 2022-06-20 RX ADMIN — Medication 1000 UNIT(S): at 11:55

## 2022-06-20 RX ADMIN — Medication 20 MILLIGRAM(S): at 11:55

## 2022-06-20 RX ADMIN — LATANOPROST 1 DROP(S): 0.05 SOLUTION/ DROPS OPHTHALMIC; TOPICAL at 22:37

## 2022-06-20 RX ADMIN — Medication 100 MILLIGRAM(S): at 14:14

## 2022-06-20 RX ADMIN — Medication 120 MILLIGRAM(S): at 05:54

## 2022-06-20 RX ADMIN — TIZANIDINE 4 MILLIGRAM(S): 4 TABLET ORAL at 14:14

## 2022-06-20 RX ADMIN — Medication 10 MILLIGRAM(S): at 05:54

## 2022-06-20 RX ADMIN — Medication 1 SPRAY(S): at 17:15

## 2022-06-20 RX ADMIN — SENNA PLUS 2 TABLET(S): 8.6 TABLET ORAL at 22:36

## 2022-06-20 RX ADMIN — Medication 1 APPLICATION(S): at 05:54

## 2022-06-20 RX ADMIN — TAMSULOSIN HYDROCHLORIDE 0.4 MILLIGRAM(S): 0.4 CAPSULE ORAL at 22:36

## 2022-06-20 RX ADMIN — Medication 2 DROP(S): at 22:36

## 2022-06-20 RX ADMIN — APIXABAN 2.5 MILLIGRAM(S): 2.5 TABLET, FILM COATED ORAL at 17:15

## 2022-06-20 RX ADMIN — ATORVASTATIN CALCIUM 40 MILLIGRAM(S): 80 TABLET, FILM COATED ORAL at 22:35

## 2022-06-20 RX ADMIN — Medication 10 MILLIGRAM(S): at 22:53

## 2022-06-20 RX ADMIN — APIXABAN 2.5 MILLIGRAM(S): 2.5 TABLET, FILM COATED ORAL at 05:53

## 2022-06-20 RX ADMIN — Medication 10 MILLIGRAM(S): at 14:14

## 2022-06-20 RX ADMIN — Medication 100 MILLIGRAM(S): at 22:36

## 2022-06-20 RX ADMIN — Medication 200 MILLIGRAM(S): at 17:16

## 2022-06-20 RX ADMIN — TIZANIDINE 4 MILLIGRAM(S): 4 TABLET ORAL at 22:35

## 2022-06-20 NOTE — PROGRESS NOTE ADULT - PROBLEM SELECTOR PLAN 7
BM yesterday, however previous BM was 1 week ago. Abdomen distended but soft on exam.  - c/w Miralax BID + senna. BM this am. Abdomen distended but soft on exam.  - c/w Miralax BID + senna. chronic - no CP or evidence of acute MI on EKG  - c/w home ASA + Statin

## 2022-06-20 NOTE — PROGRESS NOTE ADULT - PROBLEM SELECTOR PLAN 6
chronic - no CP or evidence of acute MI on EKG  - c/w home ASA + Statin chronic  - c/w home ASA + statin

## 2022-06-20 NOTE — PROGRESS NOTE ADULT - SUBJECTIVE AND OBJECTIVE BOX
Date/Time Patient Seen:  		  Referring MD:   Data Reviewed	       Patient is a 72y old  Male who presents with a chief complaint of Afib w/ RVR + COVID (19 Jun 2022 07:48)      Subjective/HPI     PAST MEDICAL & SURGICAL HISTORY:  TBI (traumatic brain injury)    HTN (hypertension)    Atrial fibrillation    DM (diabetes mellitus)    Peripheral neuropathy    Major depression    HLD (hyperlipidemia)    CAD (coronary artery disease)    BPH (benign prostatic hyperplasia)    No significant past surgical history          Medication list         MEDICATIONS  (STANDING):  ammonium lactate 12% Lotion 1 Application(s) Topical two times a day  apixaban 2.5 milliGRAM(s) Oral two times a day  aspirin  chewable 81 milliGRAM(s) Oral daily  atorvastatin 40 milliGRAM(s) Oral at bedtime  benzonatate 100 milliGRAM(s) Oral three times a day  bethanechol 10 milliGRAM(s) Oral three times a day  buPROPion XL (24-Hour) . 150 milliGRAM(s) Oral daily  cholecalciferol 1000 Unit(s) Oral daily  diltiazem    milliGRAM(s) Oral daily  fluticasone propionate 50 MICROgram(s)/spray Nasal Spray 1 Spray(s) Both Nostrils two times a day  gabapentin 300 milliGRAM(s) Oral three times a day  latanoprost 0.005% Ophthalmic Solution 1 Drop(s) Both EYES at bedtime  losartan 25 milliGRAM(s) Oral daily  methylphenidate 20 milliGRAM(s) Oral daily  polyethylene glycol 3350 17 Gram(s) Oral two times a day  senna 2 Tablet(s) Oral at bedtime  tamsulosin 0.4 milliGRAM(s) Oral at bedtime  tiZANidine 4 milliGRAM(s) Oral three times a day    MEDICATIONS  (PRN):  acetaminophen     Tablet .. 650 milliGRAM(s) Oral every 6 hours PRN Temp greater or equal to 38C (100.4F), Mild Pain (1 - 3)  aluminum hydroxide/magnesium hydroxide/simethicone Suspension 30 milliLiter(s) Oral every 4 hours PRN Dyspepsia  guaiFENesin Oral Liquid (Sugar-Free) 200 milliGRAM(s) Oral every 6 hours PRN Cough  melatonin 3 milliGRAM(s) Oral at bedtime PRN Insomnia  ondansetron Injectable 4 milliGRAM(s) IV Push every 8 hours PRN Nausea and/or Vomiting         Vitals log        ICU Vital Signs Last 24 Hrs  T(C): 36.4 (20 Jun 2022 05:40), Max: 36.8 (19 Jun 2022 09:36)  T(F): 97.6 (20 Jun 2022 05:40), Max: 98.2 (19 Jun 2022 09:36)  HR: 90 (20 Jun 2022 05:40) (68 - 90)  BP: 155/98 (20 Jun 2022 05:40) (128/89 - 155/98)  BP(mean): --  ABP: --  ABP(mean): --  RR: 17 (20 Jun 2022 05:40) (15 - 18)  SpO2: 95% (20 Jun 2022 05:40) (92% - 95%)           Input and Output:  I&O's Detail    18 Jun 2022 07:01  -  19 Jun 2022 07:00  --------------------------------------------------------  IN:  Total IN: 0 mL    OUT:    Incontinent per Condom Catheter (mL): 1200 mL    Stool (mL): 0 mL  Total OUT: 1200 mL    Total NET: -1200 mL          Lab Data                        12.1   7.62  )-----------( 177      ( 19 Jun 2022 08:30 )             37.0     06-19    146<H>  |  114<H>  |  16  ----------------------------<  150<H>  3.5   |  29  |  0.92    Ca    8.6      19 Jun 2022 08:30    TPro  5.2<L>  /  Alb  2.8<L>  /  TBili  0.3  /  DBili  <0.1  /  AST  7<L>  /  ALT  13  /  AlkPhos  82  06-19            Review of Systems	      Objective     Physical Examination    heart s1s2  lung dec BS  abd soft      Pertinent Lab findings & Imaging      Geraldo:  NO   Adequate UO     I&O's Detail    18 Jun 2022 07:01  -  19 Jun 2022 07:00  --------------------------------------------------------  IN:  Total IN: 0 mL    OUT:    Incontinent per Condom Catheter (mL): 1200 mL    Stool (mL): 0 mL  Total OUT: 1200 mL    Total NET: -1200 mL               Discussed with:     Cultures:	        Radiology

## 2022-06-20 NOTE — PROGRESS NOTE ADULT - PROBLEM SELECTOR PLAN 1
likely 2/2 fever 2/2 COVID - rate-controlled - improved  on CCB,BB - on AC  - D/C home diltiazem 300mg/24hrs.  - c/w Cardizem  qd w/ hold parameters -> a 30 day prescription was written to your local pharm  - c/w Losartan 25 mg 1 tab qd -> a 30 day prescription was written to your local pharm  - DC lopressor for soft BP, rates well controlled  - DC hydralazine given soft bp  - continue continuous tele monitoring  - No meaningful evidence of volume overload.  - Previous TTE shows EF 60-65% on 7/2020  - TTE -> mild L ventricular hypertrophy w/ LVEF 60%  - Continue ASA 81mg and Rosuvastatin 10mg   - Eplerenone on hold  cardio following - Dr Edwards /DR MARTÍNEZ Goldsmith d/w about BP  Meds  -plan to dc long term care agency  rep Elida placed order to reinstate home health aids for Monday. Family had difficulty getting private hire, however might be able to today. likely 2/2 fever 2/2 COVID - rate-controlled - improved  on CCB,BB - on AC eliquis 2.5mg bid  - D/C'ed home diltiazem 300mg/24hrs.  - c/w Cardizem  qd w/ hold parameters -> a 30 day prescription was written to your local pharm  - c/w Losartan 25 mg 1 tab qd -> a 30 day prescription was written to your local pharm  - DC'ed lopressor for soft BP, rates well controlled  - DC'ed hydralazine given soft bp  - continuous tele monitoring DC'ed  - No meaningful evidence of volume overload.  - Previous TTE shows EF 60-65% on 7/2020  - TTE this admission -> mild L ventricular hypertrophy w/ LVEF 60%  - Continue ASA 81mg and Rosuvastatin 10mg   - Eplerenone on hold  cardio following - Dr Edwards /DR MARTÍNEZ Goldsmith d/w about BP  Meds  [facility]  rep Elida placed order to reinstate home health aids for Monday 6/20. Family had difficulty getting private hire, will follow-up.

## 2022-06-20 NOTE — PROGRESS NOTE ADULT - ATTENDING COMMENTS
Pt is a 72 y.o. M with a PMH of HTN, AFIB, BPH, hx of TBI, HLD, CAD, peripheral neuropathy, found to be COVID+ who is being admitted for management of AFib with RVR., COVID 19 + infection.  Pt seen, examined, case & care plan d/w pt, residents at detail.  D/W Wife at  detail, on Phone today.  Cardiology- follow up DR Edwards group- Restart Losartan 25 mg daily  ID DR Manning -Remdesivir 3 dose completed as per ID  Pulmonary-DR Bui -supportive care, stable for d/c  Aspiration precautions.   COVID -Isolation  AM labs   PO diet.   -Pt is stable for D/C Home   -D/W case management , Pt's HHA  not available.   D/W wife at detail at bed side.   Total care time is 40 minutes .

## 2022-06-20 NOTE — PROGRESS NOTE ADULT - PROBLEM SELECTOR PLAN 5
chronic  - c/w home ASA + statin chronic, stable soft- on CCB  -Losartan 25 mg 1 tab daily.  - c/w Cardizem  qd w/ hold parameters -> a 30 day prescription was written to your local pharm  - c/w Losartan 25 mg 1 tab qd -> a 30 day prescription was written to your local pharm  - STOP home eplerenone as per Cardiology.  - STOP lopressor for soft BP, rates well controlled  - STOP hydralazine given soft bp  - Cardio out pt Cardiology follow up as out DR MARTÍNEZ Goldsmith...

## 2022-06-20 NOTE — PROGRESS NOTE ADULT - PROBLEM SELECTOR PLAN 2
COVID (+) - saturating well on RA  - c/w tylenol 650 mg po q6h prn fever  - dry cough: added tessalon perles, continue Robitussin   - nasal drip: started Flonase   - c/w albuterol q6h prn  - c/w home Eliquis mg BID  - s/p remdesivir 3 doses completed   - No steroids at this time  pulm cruzito - Hao - will appreciate recs  ID - Nigel-robert for d/c COVID (+) - saturating well on RA  - c/w tylenol 650 mg po q6h prn fever  - dry cough: continue tessalon perles, continue Robitussin   - nasal drip: started Flonase   - c/w home Eliquis 2.5 mg BID  - s/p remdesivir 3 doses completed   - No steroids at this time  pulm following - Hao - will appreciate recs  ID - Nigel-robert for d/c

## 2022-06-20 NOTE — PROGRESS NOTE ADULT - PROBLEM SELECTOR PLAN 8
chronic  - c/w Flomax + Bethanechol. BM this am. Abdomen distended but soft on exam.  - c/w Miralax BID + senna.

## 2022-06-20 NOTE — PROGRESS NOTE ADULT - PROBLEM SELECTOR PLAN 3
chronic w/ severe persisting peripheral neuropathy 2/2 prior traumatic brain bleed on Eliquis  - CT Head Non-Con ->Mild to moderate chronic microvascular changes. 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   - Fall, aspiration, safety precautions  - mostly bed bound w/ muscle contractures  - c/w gabapentin 300 mg TID, buproprion 150 mg qd, tizanidine 4 mg TID, and ritalin qd  PT -> home No urinary symptoms however urine culture positive for pseudomonas  - no need for ABX at this time.  - Continue to monitor for urinary s+s.

## 2022-06-20 NOTE — PROGRESS NOTE ADULT - PROBLEM SELECTOR PLAN 4
chronic, stable soft- on CCB, Start Cardizem  mg daily   -Losartan 25 mg 1 tab daily.  - c/w Cardizem  qd w/ hold parameters -> a 30 day prescription was written to your local pharm  - c/w Losartan 25 mg 1 tab qd -> a 30 day prescription was written to your local pharm  - STOP home eplerenone as per Cardiology.  - STOP lopressor for soft BP, rates well controlled  - STOP hydralazine given soft bp  - Cardio  out pt Cardiology follow up as out DR MARTÍNEZ Goldsmith... chronic, stable soft- on CCB  -Losartan 25 mg 1 tab daily.  - c/w Cardizem  qd w/ hold parameters -> a 30 day prescription was written to your local pharm  - c/w Losartan 25 mg 1 tab qd -> a 30 day prescription was written to your local pharm  - STOP home eplerenone as per Cardiology.  - STOP lopressor for soft BP, rates well controlled  - STOP hydralazine given soft bp  - Cardio out pt Cardiology follow up as out DR MARTÍNEZ Goldsmith... chronic w/ severe persisting peripheral neuropathy 2/2 prior traumatic brain bleed on Eliquis  - CT Head Non-Con ->Mild to moderate chronic microvascular changes. 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   - Fall, aspiration, safety precautions  - mostly bed bound w/ muscle contractures  - c/w gabapentin 300 mg TID, buproprion 150 mg qd, tizanidine 4 mg TID, and ritalin qd  PT -> home

## 2022-06-20 NOTE — PROGRESS NOTE ADULT - SUBJECTIVE AND OBJECTIVE BOX
[facility] -- Janak Edwards, Alexey العراقي, Agus Oglesby Savella, Goodger  Office # [phone]    Follow Up: Afib with RVR      Subjective/Observations: Remains comfortable on RA but c/o dry cough.  Denies any respiratory or cardiac discomfort.  No overnight events    REVIEW OF SYSTEMS: All other review of systems is negative unless indicated above  PAST MEDICAL & SURGICAL HISTORY:  TBI (traumatic brain injury)      HTN (hypertension)      Atrial fibrillation      Peripheral neuropathy      Major depression      HLD (hyperlipidemia)      CAD (coronary artery disease)      BPH (benign prostatic hyperplasia)      No significant past surgical history      MEDICATIONS  (STANDING):  ammonium lactate 12% Lotion 1 Application(s) Topical two times a day  apixaban 2.5 milliGRAM(s) Oral two times a day  aspirin  chewable 81 milliGRAM(s) Oral daily  atorvastatin 40 milliGRAM(s) Oral at bedtime  benzonatate 100 milliGRAM(s) Oral three times a day  bethanechol 10 milliGRAM(s) Oral three times a day  buPROPion XL (24-Hour) . 150 milliGRAM(s) Oral daily  cholecalciferol 1000 Unit(s) Oral daily  diltiazem    milliGRAM(s) Oral daily  fluticasone propionate 50 MICROgram(s)/spray Nasal Spray 1 Spray(s) Both Nostrils two times a day  gabapentin 300 milliGRAM(s) Oral three times a day  latanoprost 0.005% Ophthalmic Solution 1 Drop(s) Both EYES at bedtime  losartan 25 milliGRAM(s) Oral daily  methylphenidate 20 milliGRAM(s) Oral daily  polyethylene glycol 3350 17 Gram(s) Oral two times a day  senna 2 Tablet(s) Oral at bedtime  tamsulosin 0.4 milliGRAM(s) Oral at bedtime  tiZANidine 4 milliGRAM(s) Oral three times a day    MEDICATIONS  (PRN):  acetaminophen     Tablet .. 650 milliGRAM(s) Oral every 6 hours PRN Temp greater or equal to 38C (100.4F), Mild Pain (1 - 3)  aluminum hydroxide/magnesium hydroxide/simethicone Suspension 30 milliLiter(s) Oral every 4 hours PRN Dyspepsia  guaiFENesin Oral Liquid (Sugar-Free) 200 milliGRAM(s) Oral every 6 hours PRN Cough  melatonin 3 milliGRAM(s) Oral at bedtime PRN Insomnia  ondansetron Injectable 4 milliGRAM(s) IV Push every 8 hours PRN Nausea and/or Vomiting    Allergies    No Known Allergies    Intolerances    Vital Signs Last 24 Hrs  T(C): 36.4 (20 Jun 2022 05:40), Max: 36.7 (19 Jun 2022 21:41)  T(F): 97.6 (20 Jun 2022 05:40), Max: 98 (19 Jun 2022 21:41)  HR: 90 (20 Jun 2022 05:40) (68 - 90)  BP: 155/98 (20 Jun 2022 05:40) (155/89 - 155/98)  BP(mean): --  RR: 17 (20 Jun 2022 05:40) (17 - 18)  SpO2: 95% (20 Jun 2022 05:40) (93% - 95%)  I&O's Summary      PHYSICAL EXAM:  TELE: Not on tele  Constitutional: NAD, awake and alert, well-developed  HEENT: Moist Mucous Membranes, Anicteric  Pulmonary: Non-labored, breath sounds are clear but diminished bilaterally, No wheezing, rales or rhonchi  Cardiovascular: IRRR, S1 and S2, No murmurs, rubs, gallops or clicks  Gastrointestinal: Bowel Sounds present, soft, nontender.   Lymph: No peripheral edema. No lymphadenopathy.  Skin: No visible rashes or ulcers.  Psych:  Mood & affect appropriate    LABS: All Labs Reviewed:                        12.7   9.17  )-----------( 191      ( 20 Jun 2022 07:53 )             39.9                         12.1   7.62  )-----------( 177      ( 19 Jun 2022 08:30 )             37.0                         13.1   8.16  )-----------( 182      ( 18 Jun 2022 08:32 )             40.2     20 Jun 2022 07:53    147    |  112    |  13     ----------------------------<  114    3.6     |  25     |  0.94   19 Jun 2022 08:30    146    |  114    |  16     ----------------------------<  150    3.5     |  29     |  0.92   18 Jun 2022 08:32    145    |  112    |  18     ----------------------------<  116    3.9     |  26     |  0.90     Ca    8.6        20 Jun 2022 07:53  Ca    8.6        19 Jun 2022 08:30  Ca    8.7        18 Jun 2022 08:32    TPro  5.8    /  Alb  2.9    /  TBili  0.3    /  DBili  0.1    /  AST  11     /  ALT  17     /  AlkPhos  100    20 Jun 2022 07:53  TPro  5.2    /  Alb  2.8    /  TBili  0.3    /  DBili  <0.1   /  AST  7      /  ALT  13     /  AlkPhos  82     19 Jun 2022 08:30  TPro  5.9    /  Alb  3.1    /  TBili  0.3    /  DBili  0.1    /  AST  11     /  ALT  16     /  AlkPhos  88     18 Jun 2022 08:32    PT/INR - ( 20 Jun 2022 07:53 )   PT: 17.0 sec;   INR: 1.45 ratio         ACC: [id number] EXAM:  ECHO TTE WO CON COMP W DOPP                          PROCEDURE DATE:  06/15/2022          INTERPRETATION:  INDICATION: Abnormal EKG  Sonographer KL    Blood Pressure 104/63    Height 165.1 cm     Weight 65.8 kg       BSA   1.73sq m    Dimensions:  LA 4.1       Normal Values: 2.0 - 4.0 cm  Ao 3.2        Normal Values: 2.0 - 3.8 cm  SEPTUM 1.3       Normal Values: 0.6 - 1.2 cm  PWT 1.2       Normal Values: 0.6 - 1.1 cm  LVIDd 4.3         Normal Values: 3.0 - 5.6 cm  LVIDs 2.7         Normal Values: 1.8 - 4.0 cm      OBSERVATIONS:  Mitral Valve: Trace to mild MR.  Aortic Valve/Aorta: normal trileaflet aortic valve.  Tricuspid Valve: normal with trace TR.  Pulmonic Valve: Trace PI  Left Atrium: Enlarged  Right Atrium: Not well-visualized  Left Ventricle: Mild left ventricular hypertrophy with normal systolic   function, estimated LVEF of 60%.  Right Ventricle: Grossly normal size and systolic function.  Pericardium: no significant pericardial effusion.  Pulmonary/RV Pressure: estimated PA systolic pressure of 18 mmHg    IMPRESSION:  Mild left ventricular hypertrophy with normal systolic function,   estimated LVEF of 60%.  Grossly normal RV size and systolic function.  Left atrial enlargement  Normal trileaflet aortic valve, without AI.  Trace to mild MR  Trace TR.  No significant pericardial effusion.    --- End of Report ---  KLAUS MUNIZ MD; Attending Cardiologist  This document has been electronically signed. Jun 16 2022  4:54PM    ACC: [id number] EXAM:  XR CHEST PORTABLE URGENT 1V                          PROCEDURE DATE:  06/14/2022      INTERPRETATION:  AP semierect chest on June 14, 2020 at 11:05 AM. Patient   has sepsis.    Heart magnified by technique.    Lungs remain clear.    Chest is similar to May 22.    IMPRESSION: No acute finding or change.    --- End of Report ---    SUSU TODD MD; Attending Radiologist  This document has been electronically signed. Jun 14 2022 12:34PM    Ventricular Rate 123 BPM    QRS Duration 78 ms    Q-T Interval 298 ms    QTC Calculation(Bazett) 426 ms    R Axis 200 degrees    T Axis 5 degrees    Diagnosis Line Atrial fibrillation with rapid ventricular response  Right superior axis deviation  Right ventricular hypertrophy  Septal infarct (cited on or before 22-MAY-2022)  Abnormal ECG  When compared with ECG of 22-MAY-2022 14:51,  No significant change was found  Confirmed by TOMMY OGLESBY ([id number]) on 6/14/2022 12:39:22 PM

## 2022-06-20 NOTE — PROGRESS NOTE ADULT - SUBJECTIVE AND OBJECTIVE BOX
*********CHARTING IN PROGRESS***********   *********CHARTING IN PROGRESS***********      Patient is a 72y old  Male who presents with a chief complaint of Afib w/ RVR + COVID (19 Jun 2022 07:48)    HPI:  Pt is a 72 y.o. M with a PMH of HTN, AFIB, BPH, hx of TBI, HLD, CAD, peripheral neuropathy, found to be COVID+ who presented to the ED with CC of fatigue. Pt is not the best historian after having TBI 2/2 fall on AC, so history taken via phone from wife. Pt reports that this morning he woke up "feeling lousy" with a fever and a cough. Pt tested COVID + today. Pt restarted eliquis 2 days ago, and follows up with Dr. Goldsmith for cardiology while outpatient. Pt endorses fever, lethargy diminished sensation on hands and feet 2/2 neuropathy, loss of balance, cough, constipation (last BM yesterday, but before that 1 week prior). Denies CP, palpitations, n/v/d, abdominal pain, leg pain. as per wife pt had some friends who visited pt & pt also has 2 HHA .    In the ED:  T 101.2 F Oral  /90 RR 18 SpO2 98% on RA  WBC 11.44 INR 1.3 Glucose 154 Procal 0.11 COVID +  CT Head Non-Con: Mild to moderate chronic microvascular changes. 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   CXR: Lung Clear.  ECG: AFib RVR  2L NS Bolus x1, 1g IV Tylenol, 1g Rocephin (14 Jun 2022 13:13)    INTERVAL HPI:  6/15/22: Patient seen and examined at bedside. No acute events overnight. Patient on BB CCB w/ rate-controlled HR ~80's. On remdesivir for COVID. saturating well on RA. On eliquis for Afib/DVT. Endorses feeling better without significant dyspnea or cough, but has some fatigue/malaise. ON Remdisivir IVPB daily   6/16/22: Patient seen and examined at bedside. No acute events overnight. Rate controlled. HDS. On remdesivir saturating well on RA. Endourses cough but no dyspnea. "I Feel well." S/P IV Remdesivir daily x 3 dose.  6/17/22: Patient seen and examined at bedside. No acute events overnight. s/p remdesivir x3d saturating well on RA. Continued cough no dyspnea. "I feel well." rate controlled increased to cardizem 120 ER qd w/ STAT dose today. dc planning -> per , unable to obtain home health aide today, will attempt over weekend ".i want to go home"  6/18/22 Pt seen and examined at bedside. Pt states he feels "fine". Pt states he still has a dry cough. Also admits to nasal drip. Pt denies fever, chills, SOB, STACK, CP, palpitations. D/C planning.  6/19/22: Patient seen and examined at bedside. No new symptoms at this time feeling "fine." Persistent cough. dc planning today?      OVERNIGHT EVENTS:  none    Home Medications:  aspirin 81 mg oral tablet: 1 tab(s) orally once a day (14 Jun 2022 13:52)  bethanechol 10 mg oral tablet: 1 tab(s) orally 3 times a day at (8am, 3pm, 10pm) (14 Jun 2022 13:52)  buPROPion 75 mg oral tablet: 1 tab(s) orally 2 times a day (10am, 10pm) (14 Jun 2022 13:52)  Eliquis 2.5 mg oral tablet: 1 tab(s) orally 2 times a day (10am, 10pm)t (14 Jun 2022 13:52)  gabapentin 300 mg oral capsule: 1 cap(s) orally 3 times a day (8am, 3pm, 10pm (14 Jun 2022 13:52)  latanoprost 0.005% ophthalmic solution: 1 drop(s) to each affected eye once a day (in the evening) (14 Jun 2022 13:52)  methylphenidate 10 mg oral tablet: 2 tab(s) orally once a day (in the morning) (14 Jun 2022 13:52)  rosuvastatin 10 mg oral tablet: 1 tab(s) orally once a day (14 Jun 2022 13:52)  tamsulosin 0.4 mg oral capsule: 1 cap(s) orally once a day (in the evening) (14 Jun 2022 13:52)  tiZANidine 4 mg oral tablet: 1 tab(s) orally 3 times a day (14 Jun 2022 13:52)  Vitamin D3 25 mcg (1000 intl units) oral tablet: 1 tab(s) orally once a day (14 Jun 2022 13:52)      MEDICATIONS  (STANDING):  ammonium lactate 12% Lotion 1 Application(s) Topical two times a day  apixaban 2.5 milliGRAM(s) Oral two times a day  aspirin  chewable 81 milliGRAM(s) Oral daily  atorvastatin 40 milliGRAM(s) Oral at bedtime  benzonatate 100 milliGRAM(s) Oral three times a day  bethanechol 10 milliGRAM(s) Oral three times a day  buPROPion XL (24-Hour) . 150 milliGRAM(s) Oral daily  cholecalciferol 1000 Unit(s) Oral daily  diltiazem    milliGRAM(s) Oral daily  fluticasone propionate 50 MICROgram(s)/spray Nasal Spray 1 Spray(s) Both Nostrils two times a day  gabapentin 300 milliGRAM(s) Oral three times a day  latanoprost 0.005% Ophthalmic Solution 1 Drop(s) Both EYES at bedtime  losartan 25 milliGRAM(s) Oral daily  methylphenidate 20 milliGRAM(s) Oral daily  polyethylene glycol 3350 17 Gram(s) Oral two times a day  senna 2 Tablet(s) Oral at bedtime  tamsulosin 0.4 milliGRAM(s) Oral at bedtime  tiZANidine 4 milliGRAM(s) Oral three times a day    MEDICATIONS  (PRN):  acetaminophen     Tablet .. 650 milliGRAM(s) Oral every 6 hours PRN Temp greater or equal to 38C (100.4F), Mild Pain (1 - 3)  aluminum hydroxide/magnesium hydroxide/simethicone Suspension 30 milliLiter(s) Oral every 4 hours PRN Dyspepsia  guaiFENesin Oral Liquid (Sugar-Free) 200 milliGRAM(s) Oral every 6 hours PRN Cough  melatonin 3 milliGRAM(s) Oral at bedtime PRN Insomnia  ondansetron Injectable 4 milliGRAM(s) IV Push every 8 hours PRN Nausea and/or Vomiting      Allergies    No Known Allergies    Intolerances        Social History:  Vaccinated for COVID 3/3, last shot in february with moderna. Ambulates without assistance- pt is bedbound and wheelchair bound. Pt is reliant on wife for ADLs. Denies alcohol, tobacco, or recreational drug use. (14 Jun 2022 13:13)      REVIEW OF SYSTEMS:  CONSTITUTIONAL: No fever, No chills, No fatigue, No myalgia, No Body ache, No Weakness  EYES: No eye pain,  No visual disturbances, No discharge, NO Redness  ENMT:  No ear pain, No nose bleed, No vertigo; No sinus pain, NO throat pain, No Congestion  NECK: No pain, No stiffness  RESPIRATORY: No cough, NO wheezing, No  hemoptysis, NO  shortness of breath  CARDIOVASCULAR: No chest pain, palpitations  GASTROINTESTINAL: No abdominal pain, NO epigastric pain. No nausea, No vomiting; No diarrhea, No constipation. [  ] BM  GENITOURINARY: No dysuria, No frequency, No urgency, No hematuria, NO incontinence  NEUROLOGICAL: No headaches, No dizziness, No numbness, No tingling, No tremors, No weakness  EXT: No Swelling, No Pain, No Edema  SKIN:  [  ] No itching, burning, rashes, or lesions   MUSCULOSKELETAL: No joint pain ,No Jt swelling; No muscle pain, No back pain, No extremity pain  PSYCHIATRIC: No depression,  No anxiety,  No mood swings ,No difficulty sleeping at night  PAIN SCALE: [  ] None  [  ] Other-  ROS Unable to obtain due to - [  ] Dementia  [  ] Lethargy [  ] Drowsy [  ] Sedated [  ] non verbal  REST OF REVIEW Of SYSTEM - [  ] Normal     Vital Signs Last 24 Hrs  T(C): 36.4 (20 Jun 2022 05:40), Max: 36.8 (19 Jun 2022 09:36)  T(F): 97.6 (20 Jun 2022 05:40), Max: 98.2 (19 Jun 2022 09:36)  HR: 90 (20 Jun 2022 05:40) (68 - 90)  BP: 155/98 (20 Jun 2022 05:40) (128/89 - 155/98)  BP(mean): --  RR: 17 (20 Jun 2022 05:40) (15 - 18)  SpO2: 95% (20 Jun 2022 05:40) (92% - 95%)  Finger Stick          PHYSICAL EXAM:  GENERAL:  [  ] NAD , [  ] well appearing, [  ] Agitated, [  ] Drowsy,  [  ] Lethargy, [  ] confused   HEAD:  [  ] Normal, [  ] Other  EYES:  [  ] EOMI, [  ] PERRLA, [  ] conjunctiva and sclera clear normal, [  ] Other,  [  ] Pallor,[  ] Discharge  ENMT:  [  ] Normal, [  ] Moist mucous membranes, [  ] Good dentition, [  ] No Thrush  NECK:  [  ] Supple, [  ] No JVD, [  ] Normal thyroid, [  ] Lymphadenopathy [  ] Other  CHEST/LUNG:  [  ] Clear to auscultation bilaterally, [  ] Breath Sounds equal B/L / Decrease, [  ] poor effort  [  ] No rales, [  ] No rhonchi  [  ]  No wheezing,   HEART:  [  ] Regular rate and rhythm, [  ] tachycardia, [  ] Bradycardia,  [  ] irregular  [  ] No murmurs, No rubs, No gallops, [  ] PPM in place (Mfr:  )  ABDOMEN:  [  ] Soft, [  ] Nontender, [  ] Nondistended, [  ] No mass, [  ] Bowel sounds present, [  ] obese  NERVOUS SYSTEM:  [  ] Alert & Oriented X3, [  ] Nonfocal  [  ] Confusion  [  ] Encephalopathic [  ] Sedated [  ] Unable to assess, [  ] Dementia [  ] Other-  EXTREMITIES: [  ] 2+ Peripheral Pulses, No clubbing, No cyanosis,  [  ] edema B/L lower EXT. [  ] PVD stasis skin changes B/L Lower EXT, [  ] wound  LYMPH: No lymphadenopathy noted  SKIN:  [  ] No rashes or lesions, [  ] Pressure Ulcers, [  ] ecchymosis, [  ] Skin Tears, [  ] Other    DIET: Diet, Regular (06-14-22 @ 14:17)      LABS:                        12.1   7.62  )-----------( 177      ( 19 Jun 2022 08:30 )             37.0     19 Jun 2022 08:30    146    |  114    |  16     ----------------------------<  150    3.5     |  29     |  0.92     Ca    8.6        19 Jun 2022 08:30    TPro  5.2    /  Alb  2.8    /  TBili  0.3    /  DBili  <0.1   /  AST  7      /  ALT  13     /  AlkPhos  82     19 Jun 2022 08:30    PT/INR - ( 19 Jun 2022 08:30 )   PT: 17.0 sec;   INR: 1.45 ratio               Culture Results:   10,000 - 49,000 CFU/mL Pseudomonas aeruginosa  <10,000 CFU/ml Normal Urogenital carmen present (06-17 @ 12:29)  Culture Results:   No Growth Final (06-14 @ 16:30)  Culture Results:   No Growth Final (06-14 @ 16:30)                  Culture - Urine (collected 17 Jun 2022 12:29)  Source: Clean Catch Clean Catch (Midstream)  Final Report (19 Jun 2022 12:06):    10,000 - 49,000 CFU/mL Pseudomonas aeruginosa    <10,000 CFU/ml Normal Urogenital carmen present  Organism: Pseudomonas aeruginosa (19 Jun 2022 12:06)  Organism: Pseudomonas aeruginosa (19 Jun 2022 12:06)    Culture - Blood (collected 14 Jun 2022 16:30)  Source: .Blood Blood-Peripheral  Final Report (19 Jun 2022 17:00):    No Growth Final    Culture - Blood (collected 14 Jun 2022 16:30)  Source: .Blood Blood-Peripheral  Final Report (19 Jun 2022 17:00):    No Growth Final         Anemia Panel:  Ferritin, Serum: 113 ng/mL (06-15-22 @ 10:55)      Thyroid Panel:                RADIOLOGY & ADDITIONAL TESTS:      HEALTH ISSUES - PROBLEM Dx:  Atrial fibrillation    HTN (hypertension)    TBI (traumatic brain injury)    2019 novel coronavirus disease (COVID-19)    HLD (hyperlipidemia)    Need for prophylactic measure    BPH (benign prostatic hyperplasia)    CAD (coronary artery disease)    Constipation    Major depression            Consultant(s) Notes Reviewed:  [  ] YES     Care Discussed with [X] Consultants  [  ] Patient  [  ] Family [  ] HCP [  ]   [  ] Social Service  [  ] RN, [  ] Physical Therapy,[  ] Palliative care team  DVT PPX: [  ] Lovenox, [  ] S C Heparin, [  ] Coumadin, [  ] Xarelto, [  ] Eliquis, [  ] Pradaxa, [  ] IV Heparin drip, [  ] SCD [  ] Contraindication 2 to GI Bleed,[  ] Ambulation [  ] Contraindicated 2 to  bleed [  ] Contraindicated 2 to Brain Bleed  Advanced directive: [  ] None, [  ] DNR/DNI   *********CHARTING IN PROGRESS***********      Patient is a 72y old  Male who presents with a chief complaint of Afib w/ RVR + COVID (19 Jun 2022 07:48)    HPI:  Pt is a 72 y.o. M with a PMH of HTN, AFIB, BPH, hx of TBI, HLD, CAD, peripheral neuropathy, found to be COVID+ who presented to the ED with CC of fatigue. Pt is not the best historian after having TBI 2/2 fall on AC, so history taken via phone from wife. Pt reports that this morning he woke up "feeling lousy" with a fever and a cough. Pt tested COVID + today. Pt restarted eliquis 2 days ago, and follows up with Dr. Goldsmith for cardiology while outpatient. Pt endorses fever, lethargy diminished sensation on hands and feet 2/2 neuropathy, loss of balance, cough, constipation (last BM yesterday, but before that 1 week prior). Denies CP, palpitations, n/v/d, abdominal pain, leg pain. as per wife pt had some friends who visited pt & pt also has 2 HHA .    In the ED:  T 101.2 F Oral  /90 RR 18 SpO2 98% on RA  WBC 11.44 INR 1.3 Glucose 154 Procal 0.11 COVID +  CT Head Non-Con: Mild to moderate chronic microvascular changes. 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   CXR: Lung Clear.  ECG: AFib RVR  2L NS Bolus x1, 1g IV Tylenol, 1g Rocephin (14 Jun 2022 13:13)    INTERVAL HPI:  6/15/22: Patient seen and examined at bedside. No acute events overnight. Patient on BB CCB w/ rate-controlled HR ~80's. On remdesivir for COVID. saturating well on RA. On eliquis for Afib/DVT. Endorses feeling better without significant dyspnea or cough, but has some fatigue/malaise. ON Remdisivir IVPB daily   6/16/22: Patient seen and examined at bedside. No acute events overnight. Rate controlled. HDS. On remdesivir saturating well on RA. Endourses cough but no dyspnea. "I Feel well." S/P IV Remdesivir daily x 3 dose.  6/17/22: Patient seen and examined at bedside. No acute events overnight. s/p remdesivir x3d saturating well on RA. Continued cough no dyspnea. "I feel well." rate controlled increased to cardizem 120 ER qd w/ STAT dose today. dc planning -> per , unable to obtain home health aide today, will attempt over weekend ".i want to go home"  6/18/22 Pt seen and examined at bedside. Pt states he feels "fine". Pt states he still has a dry cough. Also admits to nasal drip. Pt denies fever, chills, SOB, STACK, CP, palpitations. D/C planning.  6/19/22: Patient seen and examined at bedside. No new symptoms at this time feeling "fine." Persistent cough. dc planning today?  6/20/22: Patient seen and examined at bedside. Has no complaints this AM, states he feels well and wants to go home. Admits to intermittent cough.       OVERNIGHT EVENTS:  none    Home Medications:  aspirin 81 mg oral tablet: 1 tab(s) orally once a day (14 Jun 2022 13:52)  bethanechol 10 mg oral tablet: 1 tab(s) orally 3 times a day at (8am, 3pm, 10pm) (14 Jun 2022 13:52)  buPROPion 75 mg oral tablet: 1 tab(s) orally 2 times a day (10am, 10pm) (14 Jun 2022 13:52)  Eliquis 2.5 mg oral tablet: 1 tab(s) orally 2 times a day (10am, 10pm)t (14 Jun 2022 13:52)  gabapentin 300 mg oral capsule: 1 cap(s) orally 3 times a day (8am, 3pm, 10pm (14 Jun 2022 13:52)  latanoprost 0.005% ophthalmic solution: 1 drop(s) to each affected eye once a day (in the evening) (14 Jun 2022 13:52)  methylphenidate 10 mg oral tablet: 2 tab(s) orally once a day (in the morning) (14 Jun 2022 13:52)  rosuvastatin 10 mg oral tablet: 1 tab(s) orally once a day (14 Jun 2022 13:52)  tamsulosin 0.4 mg oral capsule: 1 cap(s) orally once a day (in the evening) (14 Jun 2022 13:52)  tiZANidine 4 mg oral tablet: 1 tab(s) orally 3 times a day (14 Jun 2022 13:52)  Vitamin D3 25 mcg (1000 intl units) oral tablet: 1 tab(s) orally once a day (14 Jun 2022 13:52)      MEDICATIONS  (STANDING):  ammonium lactate 12% Lotion 1 Application(s) Topical two times a day  apixaban 2.5 milliGRAM(s) Oral two times a day  aspirin  chewable 81 milliGRAM(s) Oral daily  atorvastatin 40 milliGRAM(s) Oral at bedtime  benzonatate 100 milliGRAM(s) Oral three times a day  bethanechol 10 milliGRAM(s) Oral three times a day  buPROPion XL (24-Hour) . 150 milliGRAM(s) Oral daily  cholecalciferol 1000 Unit(s) Oral daily  diltiazem    milliGRAM(s) Oral daily  fluticasone propionate 50 MICROgram(s)/spray Nasal Spray 1 Spray(s) Both Nostrils two times a day  gabapentin 300 milliGRAM(s) Oral three times a day  latanoprost 0.005% Ophthalmic Solution 1 Drop(s) Both EYES at bedtime  losartan 25 milliGRAM(s) Oral daily  methylphenidate 20 milliGRAM(s) Oral daily  polyethylene glycol 3350 17 Gram(s) Oral two times a day  senna 2 Tablet(s) Oral at bedtime  tamsulosin 0.4 milliGRAM(s) Oral at bedtime  tiZANidine 4 milliGRAM(s) Oral three times a day    MEDICATIONS  (PRN):  acetaminophen     Tablet .. 650 milliGRAM(s) Oral every 6 hours PRN Temp greater or equal to 38C (100.4F), Mild Pain (1 - 3)  aluminum hydroxide/magnesium hydroxide/simethicone Suspension 30 milliLiter(s) Oral every 4 hours PRN Dyspepsia  guaiFENesin Oral Liquid (Sugar-Free) 200 milliGRAM(s) Oral every 6 hours PRN Cough  melatonin 3 milliGRAM(s) Oral at bedtime PRN Insomnia  ondansetron Injectable 4 milliGRAM(s) IV Push every 8 hours PRN Nausea and/or Vomiting      Allergies    No Known Allergies    Intolerances        Social History:  Vaccinated for COVID 3/3, last shot in february with moderna. Ambulates without assistance- pt is bedbound and wheelchair bound. Pt is reliant on wife for ADLs. Denies alcohol, tobacco, or recreational drug use. (14 Jun 2022 13:13)      REVIEW OF SYSTEMS: I am fine.  CONSTITUTIONAL: No fever, No chills, No fatigue, No myalgia, No Body ache, No Weakness  EYES: No eye pain,  No visual disturbances, No discharge, NO Redness  ENMT:  No ear pain, No nose bleed, No vertigo; No sinus pain, NO throat pain, No Congestion  NECK: No pain, No stiffness  RESPIRATORY: No cough, NO wheezing, No  hemoptysis, NO  shortness of breath  CARDIOVASCULAR: No chest pain, palpitations  GASTROINTESTINAL: No abdominal pain, NO epigastric pain. No nausea, No vomiting; No diarrhea, No constipation. [x  ] BM  GENITOURINARY: No dysuria, No frequency, No urgency, No hematuria, NO incontinence  NEUROLOGICAL: No headaches, No dizziness, No numbness, No tingling, No tremors, No weakness  EXT: No Swelling, No Pain, No Edema  SKIN:  [  ] No itching, burning, rashes, or lesions   MUSCULOSKELETAL: No joint pain ,No Jt swelling; No muscle pain, No back pain, No extremity pain  PSYCHIATRIC: No depression,  No anxiety,  No mood swings ,No difficulty sleeping at night  PAIN SCALE: [ x ] None  [  ] Other-  ROS Unable to obtain due to - [  ] Dementia  [  ] Lethargy [  ] Drowsy [  ] Sedated [  ] non verbal  REST OF REVIEW Of SYSTEM - [x  ] Normal     Vital Signs Last 24 Hrs  T(C): 36.4 (20 Jun 2022 05:40), Max: 36.8 (19 Jun 2022 09:36)  T(F): 97.6 (20 Jun 2022 05:40), Max: 98.2 (19 Jun 2022 09:36)  HR: 90 (20 Jun 2022 05:40) (68 - 90)  BP: 155/98 (20 Jun 2022 05:40) (128/89 - 155/98)  BP(mean): --  RR: 17 (20 Jun 2022 05:40) (15 - 18)  SpO2: 95% (20 Jun 2022 05:40) (92% - 95%)  Finger Stick          PHYSICAL EXAM:  GENERAL:  [ x ] NAD, [  x] Well appearing, lying back in bed watching TV [  ] Agitated, [  ] Drowsy, [  ] Lethargy, [  ] Confused   HEAD:  [ x ] Normal, [  ] Other  EYES:  [ x ] EOMI, [ x ] PERRLA, [x  ] Conjunctiva and sclera clear normal, [  ] Other, [  ] Pallor, [  ] Discharge  ENMT:  [ x ] Normal, [ x ] Moist mucous membranes, [ x ] Good dentition, [ x ] No thrush  NECK:  [ x ] Supple, [ x ] No JVD, [ x ] Normal thyroid, [  ] Lymphadenopathy, [  ] Other  CHEST/LUNG:  [ x ] Clear to auscultation bilaterally, [ x ] Breath Sounds equal B/L  [  ] Poor effort, [x  ] No rales, [  x] mild rhonchi on right base [ x ] mild expiratory wheeze on right base   HEART:  [ x ] Regular rate, [  ] Tachycardia, [  ] Bradycardia, [ x ] Irregular, [x  ] No murmurs, No rubs, No gallops, [  ] PPM in place (Mfr:  )  ABDOMEN:  [ x ] Soft, [x  ] Nontender, [ x ] Nondistended, [ x ] No mass, [ x ] Bowel sounds present, [  ] Obese  NERVOUS SYSTEM:  [ x ] Alert & Oriented x3, [ x ] Nonfocal, [  ] Confusion, [  ] Encephalopathic, [  ] Sedated, [  ] Unable to assess, [  ] Dementia, [ x ] Other-contractures EXT & Hand B/L   EXTREMITIES:  [ x ] 2+ Peripheral Pulses, No clubbing, No cyanosis,  [  ] Edema B/L lower EXT, [  ] PVD stasis skin changes B/L lower EXT, [  ] Wound [ x ] spasticity of all 4 extremities with prominent contractures of extensors of hands b/l  LYMPH:  No lymphadenopathy noted  SKIN:  [ x ] No rashes or lesions, [  ] Pressure ulcers, [  ] Ecchymosis, [  ] Skin tears, [  ] Other      DIET: Diet, Regular (06-14-22 @ 14:17)      LABS:                        12.1   7.62  )-----------( 177      ( 19 Jun 2022 08:30 )             37.0     19 Jun 2022 08:30    146    |  114    |  16     ----------------------------<  150    3.5     |  29     |  0.92     Ca    8.6        19 Jun 2022 08:30    TPro  5.2    /  Alb  2.8    /  TBili  0.3    /  DBili  <0.1   /  AST  7      /  ALT  13     /  AlkPhos  82     19 Jun 2022 08:30    PT/INR - ( 19 Jun 2022 08:30 )   PT: 17.0 sec;   INR: 1.45 ratio               Culture Results:   10,000 - 49,000 CFU/mL Pseudomonas aeruginosa  <10,000 CFU/ml Normal Urogenital carmen present (06-17 @ 12:29)  Culture Results:   No Growth Final (06-14 @ 16:30)  Culture Results:   No Growth Final (06-14 @ 16:30)                  Culture - Urine (collected 17 Jun 2022 12:29)  Source: Clean Catch Clean Catch (Midstream)  Final Report (19 Jun 2022 12:06):    10,000 - 49,000 CFU/mL Pseudomonas aeruginosa    <10,000 CFU/ml Normal Urogenital carmen present  Organism: Pseudomonas aeruginosa (19 Jun 2022 12:06)  Organism: Pseudomonas aeruginosa (19 Jun 2022 12:06)    Culture - Blood (collected 14 Jun 2022 16:30)  Source: .Blood Blood-Peripheral  Final Report (19 Jun 2022 17:00):    No Growth Final    Culture - Blood (collected 14 Jun 2022 16:30)  Source: .Blood Blood-Peripheral  Final Report (19 Jun 2022 17:00):    No Growth Final         Anemia Panel:  Ferritin, Serum: 113 ng/mL (06-15-22 @ 10:55)      Thyroid Panel:                RADIOLOGY & ADDITIONAL TESTS:      HEALTH ISSUES - PROBLEM Dx:  Atrial fibrillation    HTN (hypertension)    TBI (traumatic brain injury)    2019 novel coronavirus disease (COVID-19)    HLD (hyperlipidemia)    Need for prophylactic measure    BPH (benign prostatic hyperplasia)    CAD (coronary artery disease)    Constipation    Major depression            Consultant(s) Notes Reviewed:  [  ] YES     Care Discussed with [X] Consultants  [ x ] Patient  [  ] Family [  ] HCP [ x ]   [  ] Social Service  [  x] RN, [  ] Physical Therapy,[  ] Palliative care team  DVT PPX: [  ] Lovenox, [  ] S C Heparin, [  ] Coumadin, [  ] Xarelto, [ x ] Eliquis, [  ] Pradaxa, [  ] IV Heparin drip, [  ] SCD [  ] Contraindication 2 to GI Bleed,[  ] Ambulation [  ] Contraindicated 2 to  bleed [  ] Contraindicated 2 to Brain Bleed  Advanced directive: [ x ] None, [  ] DNR/DNI   Patient is a 72y old  Male who presents with a chief complaint of Afib w/ RVR + COVID (19 Jun 2022 07:48)    HPI:  Pt is a 72 y.o. M with a PMH of HTN, AFIB, BPH, hx of TBI, HLD, CAD, peripheral neuropathy, found to be COVID+ who presented to the ED with CC of fatigue. Pt is not the best historian after having TBI 2/2 fall on AC, so history taken via phone from wife. Pt reports that this morning he woke up "feeling lousy" with a fever and a cough. Pt tested COVID + today. Pt restarted eliquis 2 days ago, and follows up with Dr. Goldsmith for cardiology while outpatient. Pt endorses fever, lethargy diminished sensation on hands and feet 2/2 neuropathy, loss of balance, cough, constipation (last BM yesterday, but before that 1 week prior). Denies CP, palpitations, n/v/d, abdominal pain, leg pain. as per wife pt had some friends who visited pt & pt also has 2 HHA .    In the ED:  T 101.2 F Oral  /90 RR 18 SpO2 98% on RA  WBC 11.44 INR 1.3 Glucose 154 Procal 0.11 COVID +  CT Head Non-Con: Mild to moderate chronic microvascular changes. 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   CXR: Lung Clear.  ECG: AFib RVR  2L NS Bolus x1, 1g IV Tylenol, 1g Rocephin (14 Jun 2022 13:13)    INTERVAL HPI:  6/15/22: Patient seen and examined at bedside. No acute events overnight. Patient on BB CCB w/ rate-controlled HR ~80's. On remdesivir for COVID. saturating well on RA. On eliquis for Afib/DVT. Endorses feeling better without significant dyspnea or cough, but has some fatigue/malaise. ON Remdisivir IVPB daily   6/16/22: Patient seen and examined at bedside. No acute events overnight. Rate controlled. HDS. On remdesivir saturating well on RA. Endourses cough but no dyspnea. "I Feel well." S/P IV Remdesivir daily x 3 dose.  6/17/22: Patient seen and examined at bedside. No acute events overnight. s/p remdesivir x3d saturating well on RA. Continued cough no dyspnea. "I feel well." rate controlled increased to cardizem 120 ER qd w/ STAT dose today. dc planning -> per , unable to obtain home health aide today, will attempt over weekend ".i want to go home"  6/18/22 Pt seen and examined at bedside. Pt states he feels "fine". Pt states he still has a dry cough. Also admits to nasal drip. Pt denies fever, chills, SOB, STACK, CP, palpitations. D/C planning.  6/19/22: Patient seen and examined at bedside. No new symptoms at this time feeling "fine." Persistent cough. dc planning today?  6/20/22: Patient seen and examined at bedside. Has no complaints this AM, states he feels well and wants to go home. Admits to intermittent cough. D/C planning home.      OVERNIGHT EVENTS:  none    Home Medications:  aspirin 81 mg oral tablet: 1 tab(s) orally once a day (14 Jun 2022 13:52)  bethanechol 10 mg oral tablet: 1 tab(s) orally 3 times a day at (8am, 3pm, 10pm) (14 Jun 2022 13:52)  buPROPion 75 mg oral tablet: 1 tab(s) orally 2 times a day (10am, 10pm) (14 Jun 2022 13:52)  Eliquis 2.5 mg oral tablet: 1 tab(s) orally 2 times a day (10am, 10pm)t (14 Jun 2022 13:52)  gabapentin 300 mg oral capsule: 1 cap(s) orally 3 times a day (8am, 3pm, 10pm (14 Jun 2022 13:52)  latanoprost 0.005% ophthalmic solution: 1 drop(s) to each affected eye once a day (in the evening) (14 Jun 2022 13:52)  methylphenidate 10 mg oral tablet: 2 tab(s) orally once a day (in the morning) (14 Jun 2022 13:52)  rosuvastatin 10 mg oral tablet: 1 tab(s) orally once a day (14 Jun 2022 13:52)  tamsulosin 0.4 mg oral capsule: 1 cap(s) orally once a day (in the evening) (14 Jun 2022 13:52)  tiZANidine 4 mg oral tablet: 1 tab(s) orally 3 times a day (14 Jun 2022 13:52)  Vitamin D3 25 mcg (1000 intl units) oral tablet: 1 tab(s) orally once a day (14 Jun 2022 13:52)      MEDICATIONS  (STANDING):  ammonium lactate 12% Lotion 1 Application(s) Topical two times a day  apixaban 2.5 milliGRAM(s) Oral two times a day  aspirin  chewable 81 milliGRAM(s) Oral daily  atorvastatin 40 milliGRAM(s) Oral at bedtime  benzonatate 100 milliGRAM(s) Oral three times a day  bethanechol 10 milliGRAM(s) Oral three times a day  buPROPion XL (24-Hour) . 150 milliGRAM(s) Oral daily  cholecalciferol 1000 Unit(s) Oral daily  diltiazem    milliGRAM(s) Oral daily  fluticasone propionate 50 MICROgram(s)/spray Nasal Spray 1 Spray(s) Both Nostrils two times a day  gabapentin 300 milliGRAM(s) Oral three times a day  latanoprost 0.005% Ophthalmic Solution 1 Drop(s) Both EYES at bedtime  losartan 25 milliGRAM(s) Oral daily  methylphenidate 20 milliGRAM(s) Oral daily  polyethylene glycol 3350 17 Gram(s) Oral two times a day  senna 2 Tablet(s) Oral at bedtime  tamsulosin 0.4 milliGRAM(s) Oral at bedtime  tiZANidine 4 milliGRAM(s) Oral three times a day    MEDICATIONS  (PRN):  acetaminophen     Tablet .. 650 milliGRAM(s) Oral every 6 hours PRN Temp greater or equal to 38C (100.4F), Mild Pain (1 - 3)  aluminum hydroxide/magnesium hydroxide/simethicone Suspension 30 milliLiter(s) Oral every 4 hours PRN Dyspepsia  guaiFENesin Oral Liquid (Sugar-Free) 200 milliGRAM(s) Oral every 6 hours PRN Cough  melatonin 3 milliGRAM(s) Oral at bedtime PRN Insomnia  ondansetron Injectable 4 milliGRAM(s) IV Push every 8 hours PRN Nausea and/or Vomiting      Allergies    No Known Allergies    Intolerances        Social History:  Vaccinated for COVID 3/3, last shot in february with moderna. Ambulates without assistance- pt is bedbound and wheelchair bound. Pt is reliant on wife for ADLs. Denies alcohol, tobacco, or recreational drug use. (14 Jun 2022 13:13)      REVIEW OF SYSTEMS: I am fine.  CONSTITUTIONAL: No fever, No chills, No fatigue, No myalgia, No Body ache, No Weakness  EYES: No eye pain,  No visual disturbances, No discharge, NO Redness  ENMT:  No ear pain, No nose bleed, No vertigo; No sinus pain, NO throat pain, No Congestion  NECK: No pain, No stiffness  RESPIRATORY: No cough, NO wheezing, No  hemoptysis, NO  shortness of breath  CARDIOVASCULAR: No chest pain, palpitations  GASTROINTESTINAL: No abdominal pain, NO epigastric pain. No nausea, No vomiting; No diarrhea, No constipation. [x  ] BM  GENITOURINARY: No dysuria, No frequency, No urgency, No hematuria, NO incontinence  NEUROLOGICAL: No headaches, No dizziness, No numbness, No tingling, No tremors, No weakness  EXT: No Swelling, No Pain, No Edema  SKIN:  [ x ] No itching, burning, rashes, or lesions   MUSCULOSKELETAL: No joint pain ,No Jt swelling; No muscle pain, No back pain, No extremity pain  PSYCHIATRIC: No depression,  No anxiety,  No mood swings ,No difficulty sleeping at night  PAIN SCALE: [ x ] None  [  ] Other-  ROS Unable to obtain due to - [  ] Dementia  [  ] Lethargy [  ] Drowsy [  ] Sedated [  ] non verbal  REST OF REVIEW Of SYSTEM - [x  ] Normal     Vital Signs Last 24 Hrs  T(C): 36.4 (20 Jun 2022 05:40), Max: 36.8 (19 Jun 2022 09:36)  T(F): 97.6 (20 Jun 2022 05:40), Max: 98.2 (19 Jun 2022 09:36)  HR: 90 (20 Jun 2022 05:40) (68 - 90)  BP: 155/98 (20 Jun 2022 05:40) (128/89 - 155/98)  BP(mean): --  RR: 17 (20 Jun 2022 05:40) (15 - 18)  SpO2: 95% (20 Jun 2022 05:40) (92% - 95%)  Finger Stick      PHYSICAL EXAM:  GENERAL:  [ x ] NAD, [  x] Well appearing, lying back in bed watching TV [  ] Agitated, [  ] Drowsy, [  ] Lethargy, [  ] Confused   HEAD:  [ x ] Normal, [  ] Other  EYES:  [ x ] EOMI, [ x ] PERRLA, [x  ] Conjunctiva and sclera clear normal, [  ] Other, [  ] Pallor, [  ] Discharge  ENMT:  [ x ] Normal, [ x ] Moist mucous membranes, [ x ] Good dentition, [ x ] No thrush  NECK:  [ x ] Supple, [ x ] No JVD, [ x ] Normal thyroid, [  ] Lymphadenopathy, [  ] Other  CHEST/LUNG:  [ x ] Clear to auscultation bilaterally, [ x ] Breath Sounds equal B/L  [  ] Poor effort, [x  ] No rales, [  x] mild rhonchi on right base [ x ] mild expiratory wheeze on right base   HEART:  [ x ] Regular rate, [  ] Tachycardia, [  ] Bradycardia, [ x ] Irregular, [x  ] No murmurs, No rubs, No gallops, [  ] PPM in place (Mfr:  )  ABDOMEN:  [ x ] Soft, [x  ] Nontender, [ x ] Nondistended, [ x ] No mass, [ x ] Bowel sounds present, [  ] Obese  NERVOUS SYSTEM:  [ x ] Alert & Oriented x3, [ x ] Nonfocal, [  ] Confusion, [  ] Encephalopathic, [  ] Sedated, [  ] Unable to assess, [  ] Dementia, [ x ] Other-contractures EXT & Hand B/L   EXTREMITIES:  [ x ] 2+ Peripheral Pulses, No clubbing, No cyanosis,  [  ] Edema B/L lower EXT, [  ] PVD stasis skin changes B/L lower EXT, [  ] Wound [ x ] spasticity of all 4 extremities with prominent contractures of extensors of hands b/l  LYMPH:  No lymphadenopathy noted  SKIN:  [ x ] No rashes or lesions, [  ] Pressure ulcers, [  ] Ecchymosis, [  ] Skin tears, [  ] Other      DIET: Diet, Regular (06-14-22 @ 14:17)      LABS:                        12.1   7.62  )-----------( 177      ( 19 Jun 2022 08:30 )             37.0     19 Jun 2022 08:30    146    |  114    |  16     ----------------------------<  150    3.5     |  29     |  0.92     Ca    8.6        19 Jun 2022 08:30    TPro  5.2    /  Alb  2.8    /  TBili  0.3    /  DBili  <0.1   /  AST  7      /  ALT  13     /  AlkPhos  82     19 Jun 2022 08:30    PT/INR - ( 19 Jun 2022 08:30 )   PT: 17.0 sec;   INR: 1.45 ratio          Culture Results:   10,000 - 49,000 CFU/mL Pseudomonas aeruginosa  <10,000 CFU/ml Normal Urogenital carmen present (06-17 @ 12:29)  Culture Results:   No Growth Final (06-14 @ 16:30)  Culture Results:   No Growth Final (06-14 @ 16:30)      Culture - Urine (collected 17 Jun 2022 12:29)  Source: Clean Catch Clean Catch (Midstream)  Final Report (19 Jun 2022 12:06):    10,000 - 49,000 CFU/mL Pseudomonas aeruginosa    <10,000 CFU/ml Normal Urogenital carmen present  Organism: Pseudomonas aeruginosa (19 Jun 2022 12:06)  Organism: Pseudomonas aeruginosa (19 Jun 2022 12:06)    Culture - Blood (collected 14 Jun 2022 16:30)  Source: .Blood Blood-Peripheral  Final Report (19 Jun 2022 17:00):    No Growth Final    Culture - Blood (collected 14 Jun 2022 16:30)  Source: .Blood Blood-Peripheral  Final Report (19 Jun 2022 17:00):    No Growth Final         Anemia Panel:  Ferritin, Serum: 113 ng/mL (06-15-22 @ 10:55)      Thyroid Panel:                RADIOLOGY & ADDITIONAL TESTS:      HEALTH ISSUES - PROBLEM Dx:  Atrial fibrillation    HTN (hypertension)    TBI (traumatic brain injury)    2019 novel coronavirus disease (COVID-19)    HLD (hyperlipidemia)    Need for prophylactic measure    BPH (benign prostatic hyperplasia)    CAD (coronary artery disease)    Constipation    Major depression            Consultant(s) Notes Reviewed:  [x  ] YES     Care Discussed with [X] Consultants  [ x ] Patient  [  ] Family [  ] HCP [ x ]   [  ] Social Service  [  x] RN, [  ] Physical Therapy,[  ] Palliative care team  DVT PPX: [  ] Lovenox, [  ] S C Heparin, [  ] Coumadin, [  ] Xarelto, [ x ] Eliquis, [  ] Pradaxa, [  ] IV Heparin drip, [  ] SCD [  ] Contraindication 2 to GI Bleed,[  ] Ambulation [  ] Contraindicated 2 to  bleed [  ] Contraindicated 2 to Brain Bleed  Advanced directive: [ x ] None, [  ] DNR/DNI

## 2022-06-21 LAB
ALBUMIN SERPL ELPH-MCNC: 2.7 G/DL — LOW (ref 3.3–5)
ALBUMIN SERPL ELPH-MCNC: 2.8 G/DL — LOW (ref 3.3–5)
ALP SERPL-CCNC: 100 U/L — SIGNIFICANT CHANGE UP (ref 40–120)
ALP SERPL-CCNC: 100 U/L — SIGNIFICANT CHANGE UP (ref 40–120)
ALT FLD-CCNC: 17 U/L — SIGNIFICANT CHANGE UP (ref 12–78)
ALT FLD-CCNC: 18 U/L — SIGNIFICANT CHANGE UP (ref 12–78)
ANION GAP SERPL CALC-SCNC: 7 MMOL/L — SIGNIFICANT CHANGE UP (ref 5–17)
AST SERPL-CCNC: 11 U/L — LOW (ref 15–37)
AST SERPL-CCNC: 13 U/L — LOW (ref 15–37)
BILIRUB DIRECT SERPL-MCNC: 0.1 MG/DL — SIGNIFICANT CHANGE UP (ref 0–0.3)
BILIRUB INDIRECT FLD-MCNC: 0.4 MG/DL — SIGNIFICANT CHANGE UP (ref 0.2–1)
BILIRUB SERPL-MCNC: 0.5 MG/DL — SIGNIFICANT CHANGE UP (ref 0.2–1.2)
BILIRUB SERPL-MCNC: 0.5 MG/DL — SIGNIFICANT CHANGE UP (ref 0.2–1.2)
BUN SERPL-MCNC: 12 MG/DL — SIGNIFICANT CHANGE UP (ref 7–23)
CALCIUM SERPL-MCNC: 8.5 MG/DL — SIGNIFICANT CHANGE UP (ref 8.5–10.1)
CHLORIDE SERPL-SCNC: 107 MMOL/L — SIGNIFICANT CHANGE UP (ref 96–108)
CO2 SERPL-SCNC: 28 MMOL/L — SIGNIFICANT CHANGE UP (ref 22–31)
CREAT SERPL-MCNC: 1 MG/DL — SIGNIFICANT CHANGE UP (ref 0.5–1.3)
EGFR: 81 ML/MIN/1.73M2 — SIGNIFICANT CHANGE UP
GLUCOSE SERPL-MCNC: 142 MG/DL — HIGH (ref 70–99)
HCT VFR BLD CALC: 37.3 % — LOW (ref 39–50)
HGB BLD-MCNC: 12.5 G/DL — LOW (ref 13–17)
INR BLD: 1.51 RATIO — HIGH (ref 0.88–1.16)
MCHC RBC-ENTMCNC: 27 PG — SIGNIFICANT CHANGE UP (ref 27–34)
MCHC RBC-ENTMCNC: 33.5 GM/DL — SIGNIFICANT CHANGE UP (ref 32–36)
MCV RBC AUTO: 80.6 FL — SIGNIFICANT CHANGE UP (ref 80–100)
NRBC # BLD: 0 /100 WBCS — SIGNIFICANT CHANGE UP (ref 0–0)
PLATELET # BLD AUTO: 207 K/UL — SIGNIFICANT CHANGE UP (ref 150–400)
POTASSIUM SERPL-MCNC: 3.7 MMOL/L — SIGNIFICANT CHANGE UP (ref 3.5–5.3)
POTASSIUM SERPL-SCNC: 3.7 MMOL/L — SIGNIFICANT CHANGE UP (ref 3.5–5.3)
PROT SERPL-MCNC: 5.6 G/DL — LOW (ref 6–8.3)
PROT SERPL-MCNC: 5.6 G/DL — LOW (ref 6–8.3)
PROTHROM AB SERPL-ACNC: 17.7 SEC — HIGH (ref 10.5–13.4)
RBC # BLD: 4.63 M/UL — SIGNIFICANT CHANGE UP (ref 4.2–5.8)
RBC # FLD: 14.1 % — SIGNIFICANT CHANGE UP (ref 10.3–14.5)
SODIUM SERPL-SCNC: 142 MMOL/L — SIGNIFICANT CHANGE UP (ref 135–145)
WBC # BLD: 11.23 K/UL — HIGH (ref 3.8–10.5)
WBC # FLD AUTO: 11.23 K/UL — HIGH (ref 3.8–10.5)

## 2022-06-21 PROCEDURE — 99232 SBSQ HOSP IP/OBS MODERATE 35: CPT

## 2022-06-21 RX ADMIN — TAMSULOSIN HYDROCHLORIDE 0.4 MILLIGRAM(S): 0.4 CAPSULE ORAL at 22:48

## 2022-06-21 RX ADMIN — Medication 100 MILLIGRAM(S): at 06:15

## 2022-06-21 RX ADMIN — GABAPENTIN 300 MILLIGRAM(S): 400 CAPSULE ORAL at 22:47

## 2022-06-21 RX ADMIN — Medication 100 MILLIGRAM(S): at 13:02

## 2022-06-21 RX ADMIN — TIZANIDINE 4 MILLIGRAM(S): 4 TABLET ORAL at 13:02

## 2022-06-21 RX ADMIN — Medication 10 MILLIGRAM(S): at 06:16

## 2022-06-21 RX ADMIN — Medication 1 SPRAY(S): at 18:11

## 2022-06-21 RX ADMIN — Medication 10 MILLIGRAM(S): at 22:48

## 2022-06-21 RX ADMIN — GABAPENTIN 300 MILLIGRAM(S): 400 CAPSULE ORAL at 06:16

## 2022-06-21 RX ADMIN — APIXABAN 2.5 MILLIGRAM(S): 2.5 TABLET, FILM COATED ORAL at 18:11

## 2022-06-21 RX ADMIN — APIXABAN 2.5 MILLIGRAM(S): 2.5 TABLET, FILM COATED ORAL at 06:16

## 2022-06-21 RX ADMIN — TIZANIDINE 4 MILLIGRAM(S): 4 TABLET ORAL at 06:16

## 2022-06-21 RX ADMIN — Medication 100 MILLIGRAM(S): at 22:47

## 2022-06-21 RX ADMIN — Medication 120 MILLIGRAM(S): at 06:16

## 2022-06-21 RX ADMIN — Medication 10 MILLIGRAM(S): at 13:02

## 2022-06-21 RX ADMIN — GABAPENTIN 300 MILLIGRAM(S): 400 CAPSULE ORAL at 13:02

## 2022-06-21 RX ADMIN — BUPROPION HYDROCHLORIDE 150 MILLIGRAM(S): 150 TABLET, EXTENDED RELEASE ORAL at 15:17

## 2022-06-21 RX ADMIN — ATORVASTATIN CALCIUM 40 MILLIGRAM(S): 80 TABLET, FILM COATED ORAL at 22:48

## 2022-06-21 RX ADMIN — Medication 1 SPRAY(S): at 06:17

## 2022-06-21 RX ADMIN — Medication 81 MILLIGRAM(S): at 13:02

## 2022-06-21 RX ADMIN — LOSARTAN POTASSIUM 25 MILLIGRAM(S): 100 TABLET, FILM COATED ORAL at 06:16

## 2022-06-21 RX ADMIN — Medication 1 APPLICATION(S): at 18:12

## 2022-06-21 RX ADMIN — Medication 20 MILLIGRAM(S): at 13:02

## 2022-06-21 RX ADMIN — POLYETHYLENE GLYCOL 3350 17 GRAM(S): 17 POWDER, FOR SOLUTION ORAL at 18:11

## 2022-06-21 RX ADMIN — SENNA PLUS 2 TABLET(S): 8.6 TABLET ORAL at 22:48

## 2022-06-21 RX ADMIN — Medication 1 APPLICATION(S): at 06:16

## 2022-06-21 RX ADMIN — Medication 1000 UNIT(S): at 13:02

## 2022-06-21 NOTE — DIETITIAN INITIAL EVALUATION ADULT - NSFNSGIIOFT_GEN_A_CORE
06-20-22 @ 07:01  -  06-21-22 @ 07:00  --------------------------------------------------------  OUT:    Stool (mL): 1 mL  Total OUT: 1 mL    Total NET: -1 mL

## 2022-06-21 NOTE — DIETITIAN INITIAL EVALUATION ADULT - PERTINENT LABORATORY DATA
06-21    142  |  107  |  12  ----------------------------<  142<H>  3.7   |  28  |  1.00    Ca    8.5      21 Jun 2022 06:55    TPro  5.6<L>  /  Alb  2.8<L>  /  TBili  0.5  /  DBili  0.1  /  AST  11<L>  /  ALT  17  /  AlkPhos  100  06-21  A1C with Estimated Average Glucose Result: 6.9 % (06-15-22 @ 10:52)

## 2022-06-21 NOTE — PROGRESS NOTE ADULT - ATTENDING COMMENTS
Pt is a 72 y.o. M with a PMH of HTN, AFIB, BPH, hx of TBI, HLD, CAD, peripheral neuropathy, found to be COVID+ who is being admitted for management of AFib with RVR., COVID 19 + infection.  Pt seen, examined, case & care plan d/w pt, residents at detail.  D/W Wife at  detail, on Phone today.  Cardiology- follow up DR Edwards group- Restart Losartan 25 mg daily  ID DR Manning -Remdesivir 3 dose completed as per ID  Pulmonary-DR Bui -supportive care, stable for d/c  Aspiration precautions.   COVID -Isolation  AM labs   PO diet.   -Pt is stable for D/C Home   -D/W case management , Pt's HHA  not available.   D/W wife at detail at bed side.   Total care time is 40 minutes . Pt is a 72 y.o. M with a PMH of HTN, AFIB, BPH, hx of TBI, HLD, CAD, peripheral neuropathy, found to be COVID+ who is being admitted for management of AFib with RVR., COVID 19 + infection.  Pt seen, examined, case & care plan d/w pt, residents at detail.  D/W Wife at  detail, on Phone today.  Cardiology- follow up DR Edwards group- Restart Losartan 25 mg daily  ID DR Manning -Remdesivir 3 dose completed as per ID  Pulmonary-DR Bui -supportive care, stable for d/c  Aspiration precautions.   COVID -Isolation  AM labs   PO diet.   -Pt is stable for D/C Home   -D/W case management , Pt's HHA  not available until Thursday..   Total care time is 40 minutes .

## 2022-06-21 NOTE — PROGRESS NOTE ADULT - PROBLEM SELECTOR PLAN 5
chronic, stable soft- on CCB  -Losartan 25 mg 1 tab daily.  - c/w Cardizem  qd w/ hold parameters -> a 30 day prescription was written to your local pharm  - c/w Losartan 25 mg 1 tab qd -> a 30 day prescription was written to your local pharm  - STOP home eplerenone as per Cardiology.  - STOP lopressor for soft BP, rates well controlled  - STOP hydralazine given soft bp  - Cardio out pt Cardiology follow up as out DR MARTÍNEZ Goldsmith...

## 2022-06-21 NOTE — PHYSICAL THERAPY INITIAL EVALUATION ADULT - RANGE OF MOTION EXAMINATION, REHAB EVAL
BUE grossly decreased 50%; LLE grossly decreased 25%; RLE grossly decreased 75%
BUE grossly decreased 50%; LLE grossly decreased 25%; RLE grossly decreased 75%

## 2022-06-21 NOTE — PHYSICAL THERAPY INITIAL EVALUATION ADULT - DIAGNOSIS, PT EVAL
generalized weakness, loss of  balance, decreased functional mobility.
generalized weakness, loss of  balance, decreased functional mobility.

## 2022-06-21 NOTE — PROGRESS NOTE ADULT - SUBJECTIVE AND OBJECTIVE BOX
[facility] DIVISION of INFECTIOUS DISEASE  Ovidio Manning MD PhD, Jojo Boggs MD, Hafsa Bhardwaj MD, Danica Goldsmith MD, Samy Terry MD  and providing coverage with Meet Perea MD  Providing Infectious Disease Consultations at [facility], [facility], [facility]'s      Office# [phone] to schedule follow up appointments  Answering Service for urgent calls or New Consults [phone]  Cell# to text for urgent issues Ovidio Manning [phone]     Infectious diseases progress note:    MELBA PARMAR is a 72y y. o. Male patient    COVID Patient    Allergies    No Known Allergies    Intolerances        ANTIBIOTICS/RELEVANT:  antimicrobials    immunologic:    OTHER:  acetaminophen     Tablet .. 650 milliGRAM(s) Oral every 6 hours PRN  aluminum hydroxide/magnesium hydroxide/simethicone Suspension 30 milliLiter(s) Oral every 4 hours PRN  ammonium lactate 12% Lotion 1 Application(s) Topical two times a day  apixaban 2.5 milliGRAM(s) Oral two times a day  artificial  tears Solution 2 Drop(s) Both EYES four times a day  aspirin  chewable 81 milliGRAM(s) Oral daily  atorvastatin 40 milliGRAM(s) Oral at bedtime  benzonatate 100 milliGRAM(s) Oral three times a day  bethanechol 10 milliGRAM(s) Oral three times a day  buPROPion XL (24-Hour) . 150 milliGRAM(s) Oral daily  cholecalciferol 1000 Unit(s) Oral daily  diltiazem    milliGRAM(s) Oral daily  fluticasone propionate 50 MICROgram(s)/spray Nasal Spray 1 Spray(s) Both Nostrils two times a day  gabapentin 300 milliGRAM(s) Oral three times a day  guaiFENesin Oral Liquid (Sugar-Free) 200 milliGRAM(s) Oral every 6 hours PRN  latanoprost 0.005% Ophthalmic Solution 1 Drop(s) Both EYES at bedtime  losartan 25 milliGRAM(s) Oral daily  melatonin 3 milliGRAM(s) Oral at bedtime PRN  methylphenidate 20 milliGRAM(s) Oral daily  ondansetron Injectable 4 milliGRAM(s) IV Push every 8 hours PRN  polyethylene glycol 3350 17 Gram(s) Oral two times a day  senna 2 Tablet(s) Oral at bedtime  tamsulosin 0.4 milliGRAM(s) Oral at bedtime  tiZANidine 4 milliGRAM(s) Oral three times a day      Objective:  Vital Signs Last 24 Hrs  T(C): 36.7 (21 Jun 2022 06:22), Max: 36.7 (20 Jun 2022 14:23)  T(F): 98 (21 Jun 2022 06:22), Max: 98 (20 Jun 2022 14:23)  HR: 84 (21 Jun 2022 06:22) (83 - 84)  BP: 148/82 (21 Jun 2022 06:22) (143/78 - 148/82)  BP(mean): --  RR: 18 (21 Jun 2022 06:22) (18 - 18)  SpO2: 97% (21 Jun 2022 08:36) (94% - 97%)    T(C): 36.7 (06-21-22 @ 06:22), Max: 36.7 (06-19-22 @ 21:41)  T(C): 36.7 (06-21-22 @ 06:22), Max: 36.8 (06-19-22 @ 09:36)  T(C): 36.7 (06-21-22 @ 06:22), Max: 36.8 (06-19-22 @ 09:36)    PHYSICAL EXAM:  HEENT: NC atraumatic  Neck: supple  Respiratory: no accessory muscle use, breathing comfortably  Cardiovascular: distant  Gastrointestinal: normal appearing, nondistended  Extremities: no clubbing, no cyanosis,        LABS:                          12.5   11.23 )-----------( 207      ( 21 Jun 2022 06:55 )             37.3       WBC  11.23 06-21 @ 06:55  9.17 06-20 @ 07:53  7.62 06-19 @ 08:30  8.16 06-18 @ 08:32  6.94 06-17 @ 07:55  8.47 06-16 @ 07:49  8.66 06-15 @ 08:04      06-21    142  |  107  |  12  ----------------------------<  142<H>  3.7   |  28  |  1.00    Ca    8.5      21 Jun 2022 06:55    TPro  5.6<L>  /  Alb  2.8<L>  /  TBili  0.5  /  DBili  0.1  /  AST  11<L>  /  ALT  17  /  AlkPhos  100  06-21      Creatinine, Serum: 1.00 mg/dL (06-21-22 @ 06:55)  Creatinine, Serum: 0.94 mg/dL (06-20-22 @ 07:53)  Creatinine, Serum: 0.92 mg/dL (06-19-22 @ 08:30)  Creatinine, Serum: 0.90 mg/dL (06-18-22 @ 08:32)  Creatinine, Serum: 0.87 mg/dL (06-17-22 @ 07:55)  Creatinine, Serum: 0.92 mg/dL (06-16-22 @ 16:58)  Creatinine, Serum: 0.99 mg/dL (06-16-22 @ 07:49)  Creatinine, Serum: 1.10 mg/dL (06-15-22 @ 08:04)  Creatinine, Serum: 0.99 mg/dL (06-14-22 @ 16:29)      PT/INR - ( 21 Jun 2022 06:55 )   PT: 17.7 sec;   INR: 1.51 ratio                   COVID RISK SCORE  Auto Neutrophil #: 5.47 K/uL (06-19-22 @ 08:30)  Auto Lymphocyte #: 0.98 K/uL (06-19-22 @ 08:30)  Auto Neutrophil #: 4.66 K/uL (06-17-22 @ 07:55)  Auto Lymphocyte #: 1.05 K/uL (06-17-22 @ 07:55)  Auto Neutrophil #: 6.28 K/uL (06-16-22 @ 07:49)  Auto Lymphocyte #: 0.90 K/uL (06-16-22 @ 07:49)  Auto Neutrophil #: 9.72 K/uL (06-14-22 @ 11:03)  Auto Lymphocyte #: 0.80 K/uL (06-14-22 @ 11:03)    Lactate, Blood: 1.3 mmol/L (06-14-22 @ 11:03)    Auto Eosinophil #: 0.38 K/uL (06-19-22 @ 08:30)  Auto Eosinophil #: 0.48 K/uL (06-17-22 @ 07:55)  Auto Eosinophil #: 0.27 K/uL (06-16-22 @ 07:49)  Auto Eosinophil #: 0.11 K/uL (06-14-22 @ 11:03)      Sedimentation Rate, Erythrocyte: 2 mm/hr (06-14-22 @ 11:03)    Procalcitonin, Serum: 0.11 ng/mL (06-14-22 @ 11:03)            Ferritin, Serum: 113 ng/mL (06-15-22 @ 10:55)        INR: 1.51 ratio (06-21-22 @ 06:55)  INR: 1.45 ratio (06-20-22 @ 07:53)  INR: 1.45 ratio (06-19-22 @ 08:30)  INR: 1.29 ratio (06-18-22 @ 08:32)  INR: 1.32 ratio (06-17-22 @ 07:55)  INR: 1.36 ratio (06-16-22 @ 16:58)  INR: 1.36 ratio (06-16-22 @ 07:49)  INR: 1.33 ratio (06-15-22 @ 08:04)  INR: 1.26 ratio (06-14-22 @ 16:29)  Activated Partial Thromboplastin Time: 37.0 sec (06-14-22 @ 11:03)  INR: 1.30 ratio (06-14-22 @ 11:03)    D-Dimer Assay, Quantitative: 154 ng/mL DDU (06-15-22 @ 08:04)  D-Dimer Assay, Quantitative: <150 ng/mL DDU (06-14-22 @ 16:29)        MICROBIOLOGY:              SARS-CoV-2: Detected (14 Jun 2022 11:19)  COVID-19 PCR: Detected (14 Jun 2022 11:03)      RADIOLOGY & ADDITIONAL STUDIES:

## 2022-06-21 NOTE — PROGRESS NOTE ADULT - SUBJECTIVE AND OBJECTIVE BOX
[facility] -- Janak Edwards Grossman, [place], Agus Oglesby [place], [place]: Office # [phone]    Follow Up:   Afib with RVR      Subjective/Observations:     REVIEW OF SYSTEMS: All review of systems is negative for eye, ENT, GI, , allergic, dermatologic, musculoskeletal and neurologic except as described above    PAST MEDICAL & SURGICAL HISTORY:  TBI (traumatic brain injury)      HTN (hypertension)      Atrial fibrillation      Peripheral neuropathy      Major depression      HLD (hyperlipidemia)      CAD (coronary artery disease)      BPH (benign prostatic hyperplasia)      No significant past surgical history          MEDICATIONS  (STANDING):  ammonium lactate 12% Lotion 1 Application(s) Topical two times a day  apixaban 2.5 milliGRAM(s) Oral two times a day  artificial  tears Solution 2 Drop(s) Both EYES four times a day  aspirin  chewable 81 milliGRAM(s) Oral daily  atorvastatin 40 milliGRAM(s) Oral at bedtime  benzonatate 100 milliGRAM(s) Oral three times a day  bethanechol 10 milliGRAM(s) Oral three times a day  buPROPion XL (24-Hour) . 150 milliGRAM(s) Oral daily  cholecalciferol 1000 Unit(s) Oral daily  diltiazem    milliGRAM(s) Oral daily  fluticasone propionate 50 MICROgram(s)/spray Nasal Spray 1 Spray(s) Both Nostrils two times a day  gabapentin 300 milliGRAM(s) Oral three times a day  latanoprost 0.005% Ophthalmic Solution 1 Drop(s) Both EYES at bedtime  losartan 25 milliGRAM(s) Oral daily  methylphenidate 20 milliGRAM(s) Oral daily  polyethylene glycol 3350 17 Gram(s) Oral two times a day  senna 2 Tablet(s) Oral at bedtime  tamsulosin 0.4 milliGRAM(s) Oral at bedtime  tiZANidine 4 milliGRAM(s) Oral three times a day    MEDICATIONS  (PRN):  acetaminophen     Tablet .. 650 milliGRAM(s) Oral every 6 hours PRN Temp greater or equal to 38C (100.4F), Mild Pain (1 - 3)  aluminum hydroxide/magnesium hydroxide/simethicone Suspension 30 milliLiter(s) Oral every 4 hours PRN Dyspepsia  guaiFENesin Oral Liquid (Sugar-Free) 200 milliGRAM(s) Oral every 6 hours PRN Cough  melatonin 3 milliGRAM(s) Oral at bedtime PRN Insomnia  ondansetron Injectable 4 milliGRAM(s) IV Push every 8 hours PRN Nausea and/or Vomiting    Allergies    No Known Allergies    Intolerances      Vital Signs Last 24 Hrs  T(C): 36.7 (21 Jun 2022 06:22), Max: 36.7 (20 Jun 2022 14:23)  T(F): 98 (21 Jun 2022 06:22), Max: 98 (20 Jun 2022 14:23)  HR: 84 (21 Jun 2022 06:22) (83 - 84)  BP: 148/82 (21 Jun 2022 06:22) (143/78 - 148/82)  BP(mean): --  RR: 18 (21 Jun 2022 06:22) (18 - 18)  SpO2: 97% (21 Jun 2022 08:36) (94% - 97%)  I&O's Summary    20 Jun 2022 07:01  -  21 Jun 2022 07:00  --------------------------------------------------------  IN: 888 mL / OUT: 276 mL / NET: 612 mL          TELE: Not on telemetry   PHYSICAL EXAM:  Constitutional: NAD, awake and alert, well-developed  HEENT: Moist Mucous Membranes, Anicteric  Pulmonary: Non-labored, breath sounds are clear bilaterally, No wheezing, rales or rhonchi  Cardiovascular: Regular, S1 and S2, No murmurs, rubs, gallops or clicks  Gastrointestinal: Bowel Sounds present, soft, nontender.   Lymph: No peripheral edema. No lymphadenopathy.  Skin: No visible rashes or ulcers.  Psych:  Mood & affect appropriate for situation    LABS: All Labs Reviewed:                        12.5 11.23 )-----------( 207      ( 21 Jun 2022 06:55 )             37.3                         12.7   9.17  )-----------( 191      ( 20 Jun 2022 07:53 )             39.9                         12.1   7.62  )-----------( 177      ( 19 Jun 2022 08:30 )             37.0     21 Jun 2022 06:55    142    |  107    |  12     ----------------------------<  142    3.7     |  28     |  1.00   20 Jun 2022 07:53    147    |  112    |  13     ----------------------------<  114    3.6     |  25     |  0.94   19 Jun 2022 08:30    146    |  114    |  16     ----------------------------<  150    3.5     |  29     |  0.92     Ca    8.5        21 Jun 2022 06:55  Ca    8.6        20 Jun 2022 07:53  Ca    8.6        19 Jun 2022 08:30    TPro  5.6    /  Alb  2.8    /  TBili  0.5    /  DBili  0.1    /  AST  11     /  ALT  17     /  AlkPhos  100    21 Jun 2022 06:55  TPro  5.8    /  Alb  2.9    /  TBili  0.3    /  DBili  0.1    /  AST  11     /  ALT  17     /  AlkPhos  100    20 Jun 2022 07:53  TPro  5.2    /  Alb  2.8    /  TBili  0.3    /  DBili  <0.1   /  AST  7      /  ALT  13     /  AlkPhos  82     19 Jun 2022 08:30    PT/INR - ( 21 Jun 2022 06:55 )   PT: 17.7 sec;   INR: 1.51 ratio               D-Dimer Assay, Quantitative: 154 ng/mL DDU (06-15-22 @ 08:04)  D-Dimer Assay, Quantitative: <150 ng/mL DDU (06-14-22 @ 16:29)        Cholesterol, Serum: 120 mg/dL (06-15-22 @ 10:51)  HDL Cholesterol, Serum: 49 mg/dL (06-15-22 @ 10:51)  Triglycerides, Serum: 60 mg/dL (06-15-22 @ 10:51)      12 Lead ECG:   Ventricular Rate 123 BPM    QRS Duration 78 ms    Q-T Interval 298 ms    QTC Calculation(Bazett) 426 ms    R Axis 200 degrees    T Axis 5 degrees    Diagnosis Line Atrial fibrillation with rapid ventricular response  Right superior axis deviation  Right ventricular hypertrophy  Septal infarct (cited on or before 22-MAY-2022)  Abnormal ECG  When compared with ECG of 22-MAY-2022 14:51,  No significant change was found  Confirmed by TOMMY OGLESBY ([id number]) on 6/14/2022 12:39:22 PM (06-14-22 @ 10:15)    < from: Xray Chest 2 Views PA/Lat (01.21.20 @ 14:41) >    EXAM:  XR CHEST PA LAT 2V        PROCEDURE DATE:  01/21/2020           INTERPRETATION:  XR CHEST PA AND LATERAL    INDICATION: Cough    COMPARISON: 10/22/2018    FINDINGS:    No consolidation, pleural effusion or pneumothorax. No pulmonary edema.    The cardiac silhouette is enlarged.    IMPRESSION:    Clear lungs.                       FRANKIE HURTADO M.D., ATTENDING RADIOLOGIST   This document has been electronically signed. Jan 21 2020  3:56PM                < end of copied text >  < from: TTE Echo Complete w/o Contrast w/ Doppler (06.15.22 @ 17:19) >    ACC: [id number] EXAM:  ECHO TTE WO CON COMP W DOPP                          PROCEDURE DATE:  06/15/2022          INTERPRETATION:  INDICATION: Abnormal EKG  Sonographer KL    Blood Pressure 104/63    Height 165.1 cm     Weight 65.8 kg       BSA   1.73sq m    Dimensions:  LA 4.1       Normal Values: 2.0 - 4.0 cm  Ao 3.2        Normal Values: 2.0 - 3.8 cm  SEPTUM 1.3       Normal Values: 0.6 - 1.2 cm  PWT 1.2       Normal Values: 0.6 - 1.1 cm  LVIDd 4.3         Normal Values: 3.0 - 5.6 cm  LVIDs 2.7         Normal Values: 1.8 - 4.0 cm      OBSERVATIONS:  Mitral Valve: Trace to mild MR.  Aortic Valve/Aorta: normal trileaflet aortic valve.  Tricuspid Valve: normal with trace TR.  Pulmonic Valve: Trace PI  Left Atrium: Enlarged  Right Atrium: Not well-visualized  Left Ventricle: Mild left ventricular hypertrophy with normal systolic   function, estimated LVEF of 60%.  Right Ventricle: Grossly normal size and systolic function.  Pericardium: no significant pericardial effusion.  Pulmonary/RV Pressure: estimated PA systolic pressure of 18 mmHg          IMPRESSION:  Mild left ventricular hypertrophy with normal systolic function,   estimated LVEF of 60%.  Grossly normal RV size and systolic function.  Left atrial enlargement  Normal trileaflet aortic valve, without AI.  Trace to mild MR  Trace TR.  No significant pericardial effusion.    --- End of Report ---            KLAUS MUNIZ MD; Attending Cardiologist  This document has been electronically signed. Jun 16 2022  4:54PM    < end of copied text >

## 2022-06-21 NOTE — PHYSICAL THERAPY INITIAL EVALUATION ADULT - PERTINENT HX OF CURRENT PROBLEM, REHAB EVAL
73 yo M COVID+ who presented to the ED with CC of fatigue. h/x of TBI 2/2 fall on AC. Pt reports woke up "feeling lousy" with fever and cough. Endorses lethargy diminished sensation on hands and feet 2/2 neuropathy, loss of balance, cough, constipation CT Head: Mild to moderate chronic microvascular changes. 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 CXR: Lung Clear.
73 y/o male with PMHx HTN, A Fib (on Eliquis) presents today due to fever and lethargy. pt wife reports patient has hx of TBI with residual weakness to extremities. pt was taken off AC but placed on Eliquis. Reports patient tested positive for COVID. Pt denies vomiting, diarrhea, chest pain, hematochezia, SOB, headache, abdominal pain, or any other complaints.

## 2022-06-21 NOTE — DIETITIAN INITIAL EVALUATION ADULT - ORAL INTAKE PTA/DIET HISTORY
patient reports with good PO PTA . seen ate sandwich for breakfast. dislikes some foods unable to give food preferences with history noted  no food allergies  patient eating cheese doodles from home

## 2022-06-21 NOTE — PROGRESS NOTE ADULT - SUBJECTIVE AND OBJECTIVE BOX
Date/Time Patient Seen:  		  Referring MD:   Data Reviewed	       Patient is a 72y old  Male who presents with a chief complaint of A Fib with RVR (20 Jun 2022 07:11)      Subjective/HPI     PAST MEDICAL & SURGICAL HISTORY:  TBI (traumatic brain injury)    HTN (hypertension)    Atrial fibrillation    DM (diabetes mellitus)    Peripheral neuropathy    Major depression    HLD (hyperlipidemia)    CAD (coronary artery disease)    BPH (benign prostatic hyperplasia)    No significant past surgical history          Medication list         MEDICATIONS  (STANDING):  ammonium lactate 12% Lotion 1 Application(s) Topical two times a day  apixaban 2.5 milliGRAM(s) Oral two times a day  artificial  tears Solution 2 Drop(s) Both EYES four times a day  aspirin  chewable 81 milliGRAM(s) Oral daily  atorvastatin 40 milliGRAM(s) Oral at bedtime  benzonatate 100 milliGRAM(s) Oral three times a day  bethanechol 10 milliGRAM(s) Oral three times a day  buPROPion XL (24-Hour) . 150 milliGRAM(s) Oral daily  cholecalciferol 1000 Unit(s) Oral daily  diltiazem    milliGRAM(s) Oral daily  fluticasone propionate 50 MICROgram(s)/spray Nasal Spray 1 Spray(s) Both Nostrils two times a day  gabapentin 300 milliGRAM(s) Oral three times a day  latanoprost 0.005% Ophthalmic Solution 1 Drop(s) Both EYES at bedtime  losartan 25 milliGRAM(s) Oral daily  methylphenidate 20 milliGRAM(s) Oral daily  polyethylene glycol 3350 17 Gram(s) Oral two times a day  senna 2 Tablet(s) Oral at bedtime  tamsulosin 0.4 milliGRAM(s) Oral at bedtime  tiZANidine 4 milliGRAM(s) Oral three times a day    MEDICATIONS  (PRN):  acetaminophen     Tablet .. 650 milliGRAM(s) Oral every 6 hours PRN Temp greater or equal to 38C (100.4F), Mild Pain (1 - 3)  aluminum hydroxide/magnesium hydroxide/simethicone Suspension 30 milliLiter(s) Oral every 4 hours PRN Dyspepsia  guaiFENesin Oral Liquid (Sugar-Free) 200 milliGRAM(s) Oral every 6 hours PRN Cough  melatonin 3 milliGRAM(s) Oral at bedtime PRN Insomnia  ondansetron Injectable 4 milliGRAM(s) IV Push every 8 hours PRN Nausea and/or Vomiting         Vitals log        ICU Vital Signs Last 24 Hrs  T(C): 36.7 (21 Jun 2022 06:22), Max: 36.7 (20 Jun 2022 14:23)  T(F): 98 (21 Jun 2022 06:22), Max: 98 (20 Jun 2022 14:23)  HR: 84 (21 Jun 2022 06:22) (83 - 84)  BP: 148/82 (21 Jun 2022 06:22) (143/78 - 148/82)  BP(mean): --  ABP: --  ABP(mean): --  RR: 18 (21 Jun 2022 06:22) (18 - 18)  SpO2: 94% (21 Jun 2022 06:22) (94% - 94%)           Input and Output:  I&O's Detail    20 Jun 2022 07:01  -  21 Jun 2022 06:57  --------------------------------------------------------  IN:    Oral Fluid: 888 mL  Total IN: 888 mL    OUT:    Stool (mL): 1 mL    Voided (mL): 275 mL  Total OUT: 276 mL    Total NET: 612 mL          Lab Data                        12.7   9.17  )-----------( 191      ( 20 Jun 2022 07:53 )             39.9     06-20    147<H>  |  112<H>  |  13  ----------------------------<  114<H>  3.6   |  25  |  0.94    Ca    8.6      20 Jun 2022 07:53    TPro  5.8<L>  /  Alb  2.9<L>  /  TBili  0.3  /  DBili  0.1  /  AST  11<L>  /  ALT  17  /  AlkPhos  100  06-20            Review of Systems	      Objective     Physical Examination    heart s1s2  lung dec bS  abd soft  head nc      Pertinent Lab findings & Imaging      Geraldo:  NO   Adequate UO     I&O's Detail    20 Jun 2022 07:01  -  21 Jun 2022 06:57  --------------------------------------------------------  IN:    Oral Fluid: 888 mL  Total IN: 888 mL    OUT:    Stool (mL): 1 mL    Voided (mL): 275 mL  Total OUT: 276 mL    Total NET: 612 mL               Discussed with:     Cultures:	        Radiology

## 2022-06-21 NOTE — PROGRESS NOTE ADULT - PROBLEM SELECTOR PLAN 3
No urinary symptoms however urine culture positive for pseudomonas  - no need for ABX at this time.  - Continue to monitor for urinary s+s.

## 2022-06-21 NOTE — PROGRESS NOTE ADULT - PROBLEM SELECTOR PLAN 1
likely 2/2 fever 2/2 COVID - rate-controlled - improved  on CCB,BB - on AC eliquis 2.5mg bid  - D/C'ed home diltiazem 300mg/24hrs.  - c/w Cardizem  qd w/ hold parameters -> a 30 day prescription was written to your local pharm  - c/w Losartan 25 mg 1 tab qd -> a 30 day prescription was written to your local pharm  - DC'ed lopressor for soft BP, rates well controlled  - DC'ed hydralazine given soft bp  - continuous tele monitoring DC'ed  - No meaningful evidence of volume overload.  - Previous TTE shows EF 60-65% on 7/2020  - TTE this admission -> mild L ventricular hypertrophy w/ LVEF 60%  - Continue ASA 81mg and Rosuvastatin 10mg   - Eplerenone on hold  cardio following - Dr Edwards /DR MARTÍNEZ Goldsmith d/w about BP  Meds  -plan to [place] long term care agency  rep Elida placed order to reinstate home health aids for Monday 6/20. Family had difficulty getting private hire, will follow-up. likely 2/2 fever 2/2 COVID - rate-controlled - improved  on CCB,BB - on AC eliquis 2.5mg bid  - D/C'ed home diltiazem 300mg/24hrs.  - c/w Cardizem  qd w/ hold parameters -> a 30 day prescription was written to your local pharm  - c/w Losartan 25 mg 1 tab qd -> a 30 day prescription was written to your local pharm  - DC'ed lopressor for soft BP, rates well controlled  - DC'ed hydralazine given soft bp  - continuous tele monitoring DC'ed  - No meaningful evidence of volume overload.  - Previous TTE shows EF 60-65% on 7/2020  - TTE this admission -> mild L ventricular hypertrophy w/ LVEF 60%  - Continue ASA 81mg and Rosuvastatin 10mg   - Eplerenone on hold  cardio following - Dr Edwards /DR MARTÍNEZ Goldsmith d/w about BP  Meds  -plan to [place] long term care agency  rep Elida placed order to reinstate home health aids for Monday 6/20. Family had difficulty getting private hire. If private hire is found for today patient can be d/aileen, otherwise likely d/c on thursday at 2pm likely 2/2 fever 2/2 COVID - rate-controlled - improved  on CCB,BB - on AC eliquis 2.5mg bid  - c/w Cardizem  qd w/ hold parameters -> a 30 day prescription was written to your local pharm  - c/w Losartan 25 mg 1 tab qd -> a 30 day prescription was written to your local pharm  - DC'ed lopressor for soft BP, rates well controlled  - DC'ed hydralazine given soft bp  - continuous tele monitoring DC'ed  - No meaningful evidence of volume overload.  - Previous TTE shows EF 60-65% on 7/2020  - TTE this admission -> mild L ventricular hypertrophy w/ LVEF 60%  - Continue ASA 81mg and Rosuvastatin 10mg   - Eplerenone on hold  cardio following - Dr Edwards /DR MARTÍNEZ Goldsmith d/w about BP  Meds  -plan to [place] long term care agency  rep Elida placed order to reinstate home health aids for Monday 6/20. Family had difficulty getting private hire. If private hire is found for today patient can be d/aileen, otherwise likely d/c on thursday at 2pm likely 2/2 fever 2/2 COVID - rate-controlled - improved  on CCB,BB - on AC eliquis 2.5mg bid  - c/w Cardizem  qd w/ hold parameters -  - c/w Losartan 25 mg 1 tab qd  - DC'ed lopressor for soft BP, rates well controlled  - DC'ed hydralazine given soft bp  - continuous tele monitoring DC'ed  - No meaningful evidence of volume overload.  - Previous TTE shows EF 60-65% on 7/2020  - TTE this admission -> mild L ventricular hypertrophy w/ LVEF 60%  - Continue ASA 81mg and Rosuvastatin 10mg   - Eplerenone on hold  cardio following - Dr Edwards /DR MARTÍNEZ Goldsmith d/w about BP  Meds  -plan to [place] long term care agency  rep Elida placed order to reinstate home health aids for Monday 6/20. Family had difficulty getting private hire. If private hire is found for today patient can be d/aileen, otherwise likely d/c on thursday at 2pm

## 2022-06-21 NOTE — PROGRESS NOTE ADULT - SUBJECTIVE AND OBJECTIVE BOX
*********CHARTING IN PROGRESS***********      Patient is a 72y old  Male who presents with a chief complaint of A Fib with RVR (20 Jun 2022 07:11)    HPI:  Pt is a 72 y.o. M with a PMH of HTN, AFIB, BPH, hx of TBI, HLD, CAD, peripheral neuropathy, found to be COVID+ who presented to the ED with CC of fatigue. Pt is not the best historian after having TBI 2/2 fall on AC, so history taken via phone from wife. Pt reports that this morning he woke up "feeling lousy" with a fever and a cough. Pt tested COVID + today. Pt restarted eliquis 2 days ago, and follows up with Dr. Goldsmith for cardiology while outpatient. Pt endorses fever, lethargy diminished sensation on hands and feet 2/2 neuropathy, loss of balance, cough, constipation (last BM yesterday, but before that 1 week prior). Denies CP, palpitations, n/v/d, abdominal pain, leg pain. as per wife pt had some friends who visited pt & pt also has 2 HHA .    In the ED:  T 101.2 F Oral  /90 RR 18 SpO2 98% on RA  WBC 11.44 INR 1.3 Glucose 154 Procal 0.11 COVID +  CT Head Non-Con: Mild to moderate chronic microvascular changes. 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   CXR: Lung Clear.  ECG: AFib RVR  2L NS Bolus x1, 1g IV Tylenol, 1g Rocephin (14 Jun 2022 13:13)    INTERVAL HPI:      OVERNIGHT EVENTS:    Home Medications:  aspirin 81 mg oral tablet: 1 tab(s) orally once a day (14 Jun 2022 13:52)  bethanechol 10 mg oral tablet: 1 tab(s) orally 3 times a day at (8am, 3pm, 10pm) (14 Jun 2022 13:52)  buPROPion 75 mg oral tablet: 1 tab(s) orally 2 times a day (10am, 10pm) (14 Jun 2022 13:52)  Eliquis 2.5 mg oral tablet: 1 tab(s) orally 2 times a day (10am, 10pm)t (14 Jun 2022 13:52)  gabapentin 300 mg oral capsule: 1 cap(s) orally 3 times a day (8am, 3pm, 10pm (14 Jun 2022 13:52)  latanoprost 0.005% ophthalmic solution: 1 drop(s) to each affected eye once a day (in the evening) (14 Jun 2022 13:52)  methylphenidate 10 mg oral tablet: 2 tab(s) orally once a day (in the morning) (14 Jun 2022 13:52)  rosuvastatin 10 mg oral tablet: 1 tab(s) orally once a day (14 Jun 2022 13:52)  tamsulosin 0.4 mg oral capsule: 1 cap(s) orally once a day (in the evening) (14 Jun 2022 13:52)  tiZANidine 4 mg oral tablet: 1 tab(s) orally 3 times a day (14 Jun 2022 13:52)  Vitamin D3 25 mcg (1000 intl units) oral tablet: 1 tab(s) orally once a day (14 Jun 2022 13:52)      MEDICATIONS  (STANDING):  ammonium lactate 12% Lotion 1 Application(s) Topical two times a day  apixaban 2.5 milliGRAM(s) Oral two times a day  artificial  tears Solution 2 Drop(s) Both EYES four times a day  aspirin  chewable 81 milliGRAM(s) Oral daily  atorvastatin 40 milliGRAM(s) Oral at bedtime  benzonatate 100 milliGRAM(s) Oral three times a day  bethanechol 10 milliGRAM(s) Oral three times a day  buPROPion XL (24-Hour) . 150 milliGRAM(s) Oral daily  cholecalciferol 1000 Unit(s) Oral daily  diltiazem    milliGRAM(s) Oral daily  fluticasone propionate 50 MICROgram(s)/spray Nasal Spray 1 Spray(s) Both Nostrils two times a day  gabapentin 300 milliGRAM(s) Oral three times a day  latanoprost 0.005% Ophthalmic Solution 1 Drop(s) Both EYES at bedtime  losartan 25 milliGRAM(s) Oral daily  methylphenidate 20 milliGRAM(s) Oral daily  polyethylene glycol 3350 17 Gram(s) Oral two times a day  senna 2 Tablet(s) Oral at bedtime  tamsulosin 0.4 milliGRAM(s) Oral at bedtime  tiZANidine 4 milliGRAM(s) Oral three times a day    MEDICATIONS  (PRN):  acetaminophen     Tablet .. 650 milliGRAM(s) Oral every 6 hours PRN Temp greater or equal to 38C (100.4F), Mild Pain (1 - 3)  aluminum hydroxide/magnesium hydroxide/simethicone Suspension 30 milliLiter(s) Oral every 4 hours PRN Dyspepsia  guaiFENesin Oral Liquid (Sugar-Free) 200 milliGRAM(s) Oral every 6 hours PRN Cough  melatonin 3 milliGRAM(s) Oral at bedtime PRN Insomnia  ondansetron Injectable 4 milliGRAM(s) IV Push every 8 hours PRN Nausea and/or Vomiting      Allergies    No Known Allergies    Intolerances        Social History:  Vaccinated for COVID 3/3, last shot in february with moderna. Ambulates without assistance- pt is bedbound and wheelchair bound. Pt is reliant on wife for ADLs. Denies alcohol, tobacco, or recreational drug use. (14 Jun 2022 13:13)      REVIEW OF SYSTEMS:  CONSTITUTIONAL: No fever, No chills, No fatigue, No myalgia, No Body ache, No Weakness  EYES: No eye pain,  No visual disturbances, No discharge, NO Redness  ENMT:  No ear pain, No nose bleed, No vertigo; No sinus pain, NO throat pain, No Congestion  NECK: No pain, No stiffness  RESPIRATORY: No cough, NO wheezing, No  hemoptysis, NO  shortness of breath  CARDIOVASCULAR: No chest pain, palpitations  GASTROINTESTINAL: No abdominal pain, NO epigastric pain. No nausea, No vomiting; No diarrhea, No constipation. [  ] BM  GENITOURINARY: No dysuria, No frequency, No urgency, No hematuria, NO incontinence  NEUROLOGICAL: No headaches, No dizziness, No numbness, No tingling, No tremors, No weakness  EXT: No Swelling, No Pain, No Edema  SKIN:  [  ] No itching, burning, rashes, or lesions   MUSCULOSKELETAL: No joint pain ,No Jt swelling; No muscle pain, No back pain, No extremity pain  PSYCHIATRIC: No depression,  No anxiety,  No mood swings ,No difficulty sleeping at night  PAIN SCALE: [  ] None  [  ] Other-  ROS Unable to obtain due to - [  ] Dementia  [  ] Lethargy [  ] Drowsy [  ] Sedated [  ] non verbal  REST OF REVIEW Of SYSTEM - [  ] Normal     Vital Signs Last 24 Hrs  T(C): 36.7 (21 Jun 2022 06:22), Max: 36.7 (20 Jun 2022 14:23)  T(F): 98 (21 Jun 2022 06:22), Max: 98 (20 Jun 2022 14:23)  HR: 84 (21 Jun 2022 06:22) (83 - 84)  BP: 148/82 (21 Jun 2022 06:22) (143/78 - 148/82)  BP(mean): --  RR: 18 (21 Jun 2022 06:22) (18 - 18)  SpO2: 94% (21 Jun 2022 06:22) (94% - 94%)  Finger Stick        06-20 @ 07:01  -  06-21 @ 07:00  --------------------------------------------------------  IN: 888 mL / OUT: 276 mL / NET: 612 mL        PHYSICAL EXAM:  GENERAL:  [  ] NAD , [  ] well appearing, [  ] Agitated, [  ] Drowsy,  [  ] Lethargy, [  ] confused   HEAD:  [  ] Normal, [  ] Other  EYES:  [  ] EOMI, [  ] PERRLA, [  ] conjunctiva and sclera clear normal, [  ] Other,  [  ] Pallor,[  ] Discharge  ENMT:  [  ] Normal, [  ] Moist mucous membranes, [  ] Good dentition, [  ] No Thrush  NECK:  [  ] Supple, [  ] No JVD, [  ] Normal thyroid, [  ] Lymphadenopathy [  ] Other  CHEST/LUNG:  [  ] Clear to auscultation bilaterally, [  ] Breath Sounds equal B/L / Decrease, [  ] poor effort  [  ] No rales, [  ] No rhonchi  [  ]  No wheezing,   HEART:  [  ] Regular rate and rhythm, [  ] tachycardia, [  ] Bradycardia,  [  ] irregular  [  ] No murmurs, No rubs, No gallops, [  ] PPM in place (Mfr:  )  ABDOMEN:  [  ] Soft, [  ] Nontender, [  ] Nondistended, [  ] No mass, [  ] Bowel sounds present, [  ] obese  NERVOUS SYSTEM:  [  ] Alert & Oriented X3, [  ] Nonfocal  [  ] Confusion  [  ] Encephalopathic [  ] Sedated [  ] Unable to assess, [  ] Dementia [  ] Other-  EXTREMITIES: [  ] 2+ Peripheral Pulses, No clubbing, No cyanosis,  [  ] edema B/L lower EXT. [  ] PVD stasis skin changes B/L Lower EXT, [  ] wound  LYMPH: No lymphadenopathy noted  SKIN:  [  ] No rashes or lesions, [  ] Pressure Ulcers, [  ] ecchymosis, [  ] Skin Tears, [  ] Other    DIET: Diet, Regular (06-14-22 @ 14:17)      LABS:                        12.5   11.23 )-----------( 207      ( 21 Jun 2022 06:55 )             37.3       Ca    8.6        20 Jun 2022 07:53      PT/INR - ( 20 Jun 2022 07:53 )   PT: 17.0 sec;   INR: 1.45 ratio               Culture Results:   10,000 - 49,000 CFU/mL Pseudomonas aeruginosa  <10,000 CFU/ml Normal Urogenital carmen present (06-17 @ 12:29)  Culture Results:   No Growth Final (06-14 @ 16:30)  Culture Results:   No Growth Final (06-14 @ 16:30)                  Culture - Urine (collected 17 Jun 2022 12:29)  Source: Clean Catch Clean Catch (Midstream)  Final Report (19 Jun 2022 12:06):    10,000 - 49,000 CFU/mL Pseudomonas aeruginosa    <10,000 CFU/ml Normal Urogenital carmen present  Organism: Pseudomonas aeruginosa (19 Jun 2022 12:06)  Organism: Pseudomonas aeruginosa (19 Jun 2022 12:06)    Culture - Blood (collected 14 Jun 2022 16:30)  Source: .Blood Blood-Peripheral  Final Report (19 Jun 2022 17:00):    No Growth Final    Culture - Blood (collected 14 Jun 2022 16:30)  Source: .Blood Blood-Peripheral  Final Report (19 Jun 2022 17:00):    No Growth Final         Anemia Panel:  Ferritin, Serum: 113 ng/mL (06-15-22 @ 10:55)      Thyroid Panel:                RADIOLOGY & ADDITIONAL TESTS:      HEALTH ISSUES - PROBLEM Dx:  Atrial fibrillation    2019 novel coronavirus disease (COVID-19)    Asymptomatic bacteriuria    TBI (traumatic brain injury)    HTN (hypertension)    HLD (hyperlipidemia)    CAD (coronary artery disease)    Constipation    BPH (benign prostatic hyperplasia)    Major depression    Need for prophylactic measure            Consultant(s) Notes Reviewed:  [  ] YES     Care Discussed with [X] Consultants  [  ] Patient  [  ] Family [  ] HCP [  ]   [  ] Social Service  [  ] RN, [  ] Physical Therapy,[  ] Palliative care team  DVT PPX: [  ] Lovenox, [  ] S C Heparin, [  ] Coumadin, [  ] Xarelto, [  ] Eliquis, [  ] Pradaxa, [  ] IV Heparin drip, [  ] SCD [  ] Contraindication 2 to GI Bleed,[  ] Ambulation [  ] Contraindicated 2 to  bleed [  ] Contraindicated 2 to Brain Bleed  Advanced directive: [  ] None, [  ] DNR/DNI Patient is a 72y old  Male who presents with a chief complaint of A Fib with RVR (20 Jun 2022 07:11)    HPI:  Pt is a 72 y.o. M with a PMH of HTN, AFIB, BPH, hx of TBI, HLD, CAD, peripheral neuropathy, found to be COVID+ who presented to the ED with CC of fatigue. Pt is not the best historian after having TBI 2/2 fall on AC, so history taken via phone from wife. Pt reports that this morning he woke up "feeling lousy" with a fever and a cough. Pt tested COVID + today. Pt restarted eliquis 2 days ago, and follows up with Dr. Goldsmith for cardiology while outpatient. Pt endorses fever, lethargy diminished sensation on hands and feet 2/2 neuropathy, loss of balance, cough, constipation (last BM yesterday, but before that 1 week prior). Denies CP, palpitations, n/v/d, abdominal pain, leg pain. as per wife pt had some friends who visited pt & pt also has 2 HHA .    In the ED:  T 101.2 F Oral  /90 RR 18 SpO2 98% on RA  WBC 11.44 INR 1.3 Glucose 154 Procal 0.11 COVID +  CT Head Non-Con: Mild to moderate chronic microvascular changes. 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   CXR: Lung Clear.  ECG: AFib RVR  2L NS Bolus x1, 1g IV Tylenol, 1g Rocephin (14 Jun 2022 13:13)    INTERVAL HPI:  6/15/22: Patient seen and examined at bedside. No acute events overnight. Patient on BB CCB w/ rate-controlled HR ~80's. On remdesivir for COVID. saturating well on RA. On eliquis for Afib/DVT. Endorses feeling better without significant dyspnea or cough, but has some fatigue/malaise. ON Remdisivir IVPB daily   6/16/22: Patient seen and examined at bedside. No acute events overnight. Rate controlled. HDS. On remdesivir saturating well on RA. Endourses cough but no dyspnea. "I Feel well." S/P IV Remdesivir daily x 3 dose.  6/17/22: Patient seen and examined at bedside. No acute events overnight. s/p remdesivir x3d saturating well on RA. Continued cough no dyspnea. "I feel well." rate controlled increased to cardizem 120 ER qd w/ STAT dose today. dc planning -> per , unable to obtain home health aide today, will attempt over weekend ".i want to go home"  6/18/22 Pt seen and examined at bedside. Pt states he feels "fine". Pt states he still has a dry cough. Also admits to nasal drip. Pt denies fever, chills, SOB, STACK, CP, palpitations. D/C planning.  6/19/22: Patient seen and examined at bedside. No new symptoms at this time feeling "fine." Persistent cough. dc planning today?  6/20/22: Patient seen and examined at bedside. Has no complaints this AM, states he feels well and wants to go home. Admits to intermittent cough. D/C planning home.  6/21/22: Patient seen and examined at bedside. No new complaints. He states that he worked with physical therapy earlier and it "did not go very well". Still admits to intermittent cough, otherwise endorses no new complaints. D/C planning.      OVERNIGHT EVENTS:  none    Home Medications:  aspirin 81 mg oral tablet: 1 tab(s) orally once a day (14 Jun 2022 13:52)  bethanechol 10 mg oral tablet: 1 tab(s) orally 3 times a day at (8am, 3pm, 10pm) (14 Jun 2022 13:52)  buPROPion 75 mg oral tablet: 1 tab(s) orally 2 times a day (10am, 10pm) (14 Jun 2022 13:52)  Eliquis 2.5 mg oral tablet: 1 tab(s) orally 2 times a day (10am, 10pm)t (14 Jun 2022 13:52)  gabapentin 300 mg oral capsule: 1 cap(s) orally 3 times a day (8am, 3pm, 10pm (14 Jun 2022 13:52)  latanoprost 0.005% ophthalmic solution: 1 drop(s) to each affected eye once a day (in the evening) (14 Jun 2022 13:52)  methylphenidate 10 mg oral tablet: 2 tab(s) orally once a day (in the morning) (14 Jun 2022 13:52)  rosuvastatin 10 mg oral tablet: 1 tab(s) orally once a day (14 Jun 2022 13:52)  tamsulosin 0.4 mg oral capsule: 1 cap(s) orally once a day (in the evening) (14 Jun 2022 13:52)  tiZANidine 4 mg oral tablet: 1 tab(s) orally 3 times a day (14 Jun 2022 13:52)  Vitamin D3 25 mcg (1000 intl units) oral tablet: 1 tab(s) orally once a day (14 Jun 2022 13:52)      MEDICATIONS  (STANDING):  ammonium lactate 12% Lotion 1 Application(s) Topical two times a day  apixaban 2.5 milliGRAM(s) Oral two times a day  artificial  tears Solution 2 Drop(s) Both EYES four times a day  aspirin  chewable 81 milliGRAM(s) Oral daily  atorvastatin 40 milliGRAM(s) Oral at bedtime  benzonatate 100 milliGRAM(s) Oral three times a day  bethanechol 10 milliGRAM(s) Oral three times a day  buPROPion XL (24-Hour) . 150 milliGRAM(s) Oral daily  cholecalciferol 1000 Unit(s) Oral daily  diltiazem    milliGRAM(s) Oral daily  fluticasone propionate 50 MICROgram(s)/spray Nasal Spray 1 Spray(s) Both Nostrils two times a day  gabapentin 300 milliGRAM(s) Oral three times a day  latanoprost 0.005% Ophthalmic Solution 1 Drop(s) Both EYES at bedtime  losartan 25 milliGRAM(s) Oral daily  methylphenidate 20 milliGRAM(s) Oral daily  polyethylene glycol 3350 17 Gram(s) Oral two times a day  senna 2 Tablet(s) Oral at bedtime  tamsulosin 0.4 milliGRAM(s) Oral at bedtime  tiZANidine 4 milliGRAM(s) Oral three times a day    MEDICATIONS  (PRN):  acetaminophen     Tablet .. 650 milliGRAM(s) Oral every 6 hours PRN Temp greater or equal to 38C (100.4F), Mild Pain (1 - 3)  aluminum hydroxide/magnesium hydroxide/simethicone Suspension 30 milliLiter(s) Oral every 4 hours PRN Dyspepsia  guaiFENesin Oral Liquid (Sugar-Free) 200 milliGRAM(s) Oral every 6 hours PRN Cough  melatonin 3 milliGRAM(s) Oral at bedtime PRN Insomnia  ondansetron Injectable 4 milliGRAM(s) IV Push every 8 hours PRN Nausea and/or Vomiting      Allergies    No Known Allergies    Intolerances        Social History:  Vaccinated for COVID 3/3, last shot in february with moderna. Ambulates without assistance- pt is bedbound and wheelchair bound. Pt is reliant on wife for ADLs. Denies alcohol, tobacco, or recreational drug use. (14 Jun 2022 13:13)      REVIEW OF SYSTEMS: I am fine.  CONSTITUTIONAL: No fever, No chills, No fatigue, No myalgia, No Body ache, No Weakness  EYES: No eye pain,  No visual disturbances, No discharge, NO Redness  ENMT:  No ear pain, No nose bleed, No vertigo; No sinus pain, NO throat pain, No Congestion  NECK: No pain, No stiffness  RESPIRATORY: No cough, NO wheezing, No  hemoptysis, NO  shortness of breath  CARDIOVASCULAR: No chest pain, palpitations  GASTROINTESTINAL: No abdominal pain, NO epigastric pain. No nausea, No vomiting; No diarrhea, No constipation. [x  ] BM  GENITOURINARY: No dysuria, No frequency, No urgency, No hematuria, NO incontinence  NEUROLOGICAL: No headaches, No dizziness, No numbness, No tingling, No tremors, No weakness  EXT: No Swelling, No Pain, No Edema  SKIN:  [ x ] No itching, burning, rashes, or lesions   MUSCULOSKELETAL: No joint pain ,No Jt swelling; No muscle pain, No back pain, No extremity pain  PSYCHIATRIC: No depression,  No anxiety,  No mood swings ,No difficulty sleeping at night  PAIN SCALE: [ x ] None  [  ] Other-  ROS Unable to obtain due to - [  ] Dementia  [  ] Lethargy [  ] Drowsy [  ] Sedated [  ] non verbal  REST OF REVIEW Of SYSTEM - [x  ] Normal    Vital Signs Last 24 Hrs  T(C): 36.7 (21 Jun 2022 06:22), Max: 36.7 (20 Jun 2022 14:23)  T(F): 98 (21 Jun 2022 06:22), Max: 98 (20 Jun 2022 14:23)  HR: 84 (21 Jun 2022 06:22) (83 - 84)  BP: 148/82 (21 Jun 2022 06:22) (143/78 - 148/82)  BP(mean): --  RR: 18 (21 Jun 2022 06:22) (18 - 18)  SpO2: 94% (21 Jun 2022 06:22) (94% - 94%)  Finger Stick        06-20 @ 07:01  -  06-21 @ 07:00  --------------------------------------------------------  IN: 888 mL / OUT: 276 mL / NET: 612 mL        PHYSICAL EXAM:  GENERAL:  [ x ] NAD, [  x] Well appearing, lying back in bed watching TV [  ] Agitated, [  ] Drowsy, [  ] Lethargy, [  ] Confused   HEAD:  [ x ] Normal, [  ] Other  EYES:  [ x ] EOMI, [ x ] PERRLA, [x  ] Conjunctiva and sclera clear normal, [  ] Other, [  ] Pallor, [  ] Discharge  ENMT:  [ x ] Normal, [ x ] Moist mucous membranes, [ x ] Good dentition, [ x ] No thrush  NECK:  [ x ] Supple, [ x ] No JVD, [ x ] Normal thyroid, [  ] Lymphadenopathy, [  ] Other  CHEST/LUNG:  [ x ] Clear to auscultation bilaterally, [ x ] Breath Sounds equal B/L  [  ] Poor effort, [x  ] No rales, [  x] mild rhonchi on right base [ x ] mild expiratory wheeze on right base   HEART:  [ x ] Regular rate, [  ] Tachycardia, [  ] Bradycardia, [ x ] Irregular, [x  ] No murmurs, No rubs, No gallops, [  ] PPM in place (Mfr:  )  ABDOMEN:  [ x ] Soft, [x  ] Nontender, [ x ] Nondistended, [ x ] No mass, [ x ] Bowel sounds present, [  ] Obese  NERVOUS SYSTEM:  [ x ] Alert & Oriented x3, [ x ] Nonfocal, [  ] Confusion, [  ] Encephalopathic, [  ] Sedated, [  ] Unable to assess, [  ] Dementia, [ x ] Other-contractures EXT & Hand B/L   EXTREMITIES:  [ x ] 2+ Peripheral Pulses, No clubbing, No cyanosis,  [  ] Edema B/L lower EXT, [  ] PVD stasis skin changes B/L lower EXT, [  ] Wound [ x ] spasticity of all 4 extremities with prominent contractures of extensors of hands b/l  LYMPH:  No lymphadenopathy noted  SKIN:  [ x ] No rashes or lesions, [  ] Pressure ulcers, [  ] Ecchymosis, [  ] Skin tears, [  ] Other    DIET: Diet, Regular (06-14-22 @ 14:17)      LABS:                        12.5   11.23 )-----------( 207      ( 21 Jun 2022 06:55 )             37.3       Ca    8.6        20 Jun 2022 07:53      PT/INR - ( 20 Jun 2022 07:53 )   PT: 17.0 sec;   INR: 1.45 ratio               Culture Results:   10,000 - 49,000 CFU/mL Pseudomonas aeruginosa  <10,000 CFU/ml Normal Urogenital carmen present (06-17 @ 12:29)  Culture Results:   No Growth Final (06-14 @ 16:30)  Culture Results:   No Growth Final (06-14 @ 16:30)                  Culture - Urine (collected 17 Jun 2022 12:29)  Source: Clean Catch Clean Catch (Midstream)  Final Report (19 Jun 2022 12:06):    10,000 - 49,000 CFU/mL Pseudomonas aeruginosa    <10,000 CFU/ml Normal Urogenital carmen present  Organism: Pseudomonas aeruginosa (19 Jun 2022 12:06)  Organism: Pseudomonas aeruginosa (19 Jun 2022 12:06)    Culture - Blood (collected 14 Jun 2022 16:30)  Source: .Blood Blood-Peripheral  Final Report (19 Jun 2022 17:00):    No Growth Final    Culture - Blood (collected 14 Jun 2022 16:30)  Source: .Blood Blood-Peripheral  Final Report (19 Jun 2022 17:00):    No Growth Final         Anemia Panel:  Ferritin, Serum: 113 ng/mL (06-15-22 @ 10:55)      Thyroid Panel:                RADIOLOGY & ADDITIONAL TESTS:      HEALTH ISSUES - PROBLEM Dx:  Atrial fibrillation    2019 novel coronavirus disease (COVID-19)    Asymptomatic bacteriuria    TBI (traumatic brain injury)    HTN (hypertension)    HLD (hyperlipidemia)    CAD (coronary artery disease)    Constipation    BPH (benign prostatic hyperplasia)    Major depression    Need for prophylactic measure            Consultant(s) Notes Reviewed:  [  ] YES     Care Discussed with [X] Consultants  [ x ] Patient  [  ] Family [  ] HCP [ x ]   [  ] Social Service  [  x] RN, [  ] Physical Therapy,[  ] Palliative care team  DVT PPX: [  ] Lovenox, [  ] S C Heparin, [  ] Coumadin, [  ] Xarelto, [ x ] Eliquis, [  ] Pradaxa, [  ] IV Heparin drip, [  ] SCD [  ] Contraindication 2 to GI Bleed,[  ] Ambulation [  ] Contraindicated 2 to  bleed [  ] Contraindicated 2 to Brain Bleed  Advanced directive: [ x ] None, [  ] DNR/DNI Patient is a 72y old  Male who presents with a chief complaint of A Fib with RVR (20 Jun 2022 07:11)    HPI:  Pt is a 72 y.o. M with a PMH of HTN, AFIB, BPH, hx of TBI, HLD, CAD, peripheral neuropathy, found to be COVID+ who presented to the ED with CC of fatigue. Pt is not the best historian after having TBI 2/2 fall on AC, so history taken via phone from wife. Pt reports that this morning he woke up "feeling lousy" with a fever and a cough. Pt tested COVID + today. Pt restarted eliquis 2 days ago, and follows up with Dr. Goldsmith for cardiology while outpatient. Pt endorses fever, lethargy diminished sensation on hands and feet 2/2 neuropathy, loss of balance, cough, constipation (last BM yesterday, but before that 1 week prior). Denies CP, palpitations, n/v/d, abdominal pain, leg pain. as per wife pt had some friends who visited pt & pt also has 2 HHA .    In the ED:  T 101.2 F Oral  /90 RR 18 SpO2 98% on RA  WBC 11.44 INR 1.3 Glucose 154 Procal 0.11 COVID +  CT Head Non-Con: Mild to moderate chronic microvascular changes. 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   CXR: Lung Clear.  ECG: AFib RVR  2L NS Bolus x1, 1g IV Tylenol, 1g Rocephin (14 Jun 2022 13:13)    INTERVAL HPI:  6/15/22: Patient seen and examined at bedside. No acute events overnight. Patient on BB CCB w/ rate-controlled HR ~80's. On remdesivir for COVID. saturating well on RA. On eliquis for Afib/DVT. Endorses feeling better without significant dyspnea or cough, but has some fatigue/malaise. ON Remdisivir IVPB daily   6/16/22: Patient seen and examined at bedside. No acute events overnight. Rate controlled. HDS. On remdesivir saturating well on RA. Endourses cough but no dyspnea. "I Feel well." S/P IV Remdesivir daily x 3 dose.  6/17/22: Patient seen and examined at bedside. No acute events overnight. s/p remdesivir x3d saturating well on RA. Continued cough no dyspnea. "I feel well." rate controlled increased to cardizem 120 ER qd w/ STAT dose today. dc planning -> per , unable to obtain home health aide today, will attempt over weekend ".i want to go home"  6/18/22 Pt seen and examined at bedside. Pt states he feels "fine". Pt states he still has a dry cough. Also admits to nasal drip. Pt denies fever, chills, SOB, STACK, CP, palpitations. D/C planning.  6/19/22: Patient seen and examined at bedside. No new symptoms at this time feeling "fine." Persistent cough. dc planning today?  6/20/22: Patient seen and examined at bedside. Has no complaints this AM, states he feels well and wants to go home. Admits to intermittent cough. D/C planning home.  6/21/22: Patient seen and examined at bedside. No new complaints. He states that he worked with physical therapy earlier and it "did not go very well". Still admits to intermittent cough, otherwise endorses no new complaints. D/C planning.      OVERNIGHT EVENTS:  none    Home Medications:  aspirin 81 mg oral tablet: 1 tab(s) orally once a day (14 Jun 2022 13:52)  bethanechol 10 mg oral tablet: 1 tab(s) orally 3 times a day at (8am, 3pm, 10pm) (14 Jun 2022 13:52)  buPROPion 75 mg oral tablet: 1 tab(s) orally 2 times a day (10am, 10pm) (14 Jun 2022 13:52)  Eliquis 2.5 mg oral tablet: 1 tab(s) orally 2 times a day (10am, 10pm)t (14 Jun 2022 13:52)  gabapentin 300 mg oral capsule: 1 cap(s) orally 3 times a day (8am, 3pm, 10pm (14 Jun 2022 13:52)  latanoprost 0.005% ophthalmic solution: 1 drop(s) to each affected eye once a day (in the evening) (14 Jun 2022 13:52)  methylphenidate 10 mg oral tablet: 2 tab(s) orally once a day (in the morning) (14 Jun 2022 13:52)  rosuvastatin 10 mg oral tablet: 1 tab(s) orally once a day (14 Jun 2022 13:52)  tamsulosin 0.4 mg oral capsule: 1 cap(s) orally once a day (in the evening) (14 Jun 2022 13:52)  tiZANidine 4 mg oral tablet: 1 tab(s) orally 3 times a day (14 Jun 2022 13:52)  Vitamin D3 25 mcg (1000 intl units) oral tablet: 1 tab(s) orally once a day (14 Jun 2022 13:52)      MEDICATIONS  (STANDING):  ammonium lactate 12% Lotion 1 Application(s) Topical two times a day  apixaban 2.5 milliGRAM(s) Oral two times a day  artificial  tears Solution 2 Drop(s) Both EYES four times a day  aspirin  chewable 81 milliGRAM(s) Oral daily  atorvastatin 40 milliGRAM(s) Oral at bedtime  benzonatate 100 milliGRAM(s) Oral three times a day  bethanechol 10 milliGRAM(s) Oral three times a day  buPROPion XL (24-Hour) . 150 milliGRAM(s) Oral daily  cholecalciferol 1000 Unit(s) Oral daily  diltiazem    milliGRAM(s) Oral daily  fluticasone propionate 50 MICROgram(s)/spray Nasal Spray 1 Spray(s) Both Nostrils two times a day  gabapentin 300 milliGRAM(s) Oral three times a day  latanoprost 0.005% Ophthalmic Solution 1 Drop(s) Both EYES at bedtime  losartan 25 milliGRAM(s) Oral daily  methylphenidate 20 milliGRAM(s) Oral daily  polyethylene glycol 3350 17 Gram(s) Oral two times a day  senna 2 Tablet(s) Oral at bedtime  tamsulosin 0.4 milliGRAM(s) Oral at bedtime  tiZANidine 4 milliGRAM(s) Oral three times a day    MEDICATIONS  (PRN):  acetaminophen     Tablet .. 650 milliGRAM(s) Oral every 6 hours PRN Temp greater or equal to 38C (100.4F), Mild Pain (1 - 3)  aluminum hydroxide/magnesium hydroxide/simethicone Suspension 30 milliLiter(s) Oral every 4 hours PRN Dyspepsia  guaiFENesin Oral Liquid (Sugar-Free) 200 milliGRAM(s) Oral every 6 hours PRN Cough  melatonin 3 milliGRAM(s) Oral at bedtime PRN Insomnia  ondansetron Injectable 4 milliGRAM(s) IV Push every 8 hours PRN Nausea and/or Vomiting      Allergies    No Known Allergies    Intolerances        Social History:  Vaccinated for COVID 3/3, last shot in february with moderna. Ambulates without assistance- pt is bedbound and wheelchair bound. Pt is reliant on wife for ADLs. Denies alcohol, tobacco, or recreational drug use. (14 Jun 2022 13:13)      REVIEW OF SYSTEMS: I am fine.  CONSTITUTIONAL: No fever, No chills, No fatigue, No myalgia, No Body ache, No Weakness  EYES: No eye pain,  No visual disturbances, No discharge, NO Redness  ENMT:  No ear pain, No nose bleed, No vertigo; No sinus pain, NO throat pain, No Congestion  NECK: No pain, No stiffness  RESPIRATORY: No cough, NO wheezing, No  hemoptysis, NO  shortness of breath  CARDIOVASCULAR: No chest pain, palpitations  GASTROINTESTINAL: No abdominal pain, NO epigastric pain. No nausea, No vomiting; No diarrhea, No constipation. [x  ] BM  GENITOURINARY: No dysuria, No frequency, No urgency, No hematuria, NO incontinence  NEUROLOGICAL: No headaches, No dizziness, No numbness, No tingling, No tremors, No weakness  EXT: No Swelling, No Pain, No Edema  SKIN:  [ x ] No itching, burning, rashes, or lesions   MUSCULOSKELETAL: No joint pain ,No Jt swelling; No muscle pain, No back pain, No extremity pain  PSYCHIATRIC: No depression,  No anxiety,  No mood swings ,No difficulty sleeping at night  PAIN SCALE: [ x ] None  [  ] Other-  ROS Unable to obtain due to - [  ] Dementia  [  ] Lethargy [  ] Drowsy [  ] Sedated [  ] non verbal  REST OF REVIEW Of SYSTEM - [x  ] Normal    Vital Signs Last 24 Hrs  T(C): 36.7 (21 Jun 2022 06:22), Max: 36.7 (20 Jun 2022 14:23)  T(F): 98 (21 Jun 2022 06:22), Max: 98 (20 Jun 2022 14:23)  HR: 84 (21 Jun 2022 06:22) (83 - 84)  BP: 148/82 (21 Jun 2022 06:22) (143/78 - 148/82)  BP(mean): --  RR: 18 (21 Jun 2022 06:22) (18 - 18)  SpO2: 94% (21 Jun 2022 06:22) (94% - 94%)  Finger Stick        06-20 @ 07:01  -  06-21 @ 07:00  --------------------------------------------------------  IN: 888 mL / OUT: 276 mL / NET: 612 mL        PHYSICAL EXAM:  GENERAL:  [ x ] NAD, [  x] Well appearing, lying back in bed watching TV [  ] Agitated, [  ] Drowsy, [  ] Lethargy, [  ] Confused   HEAD:  [ x ] Normal, [  ] Other  EYES:  [ x ] EOMI, [ x ] PERRLA, [x  ] Conjunctiva and sclera clear normal, [  ] Other, [  ] Pallor, [  ] Discharge  ENMT:  [ x ] Normal, [ x ] Moist mucous membranes, [ x ] Good dentition, [ x ] No thrush  NECK:  [ x ] Supple, [ x ] No JVD, [ x ] Normal thyroid, [  ] Lymphadenopathy, [  ] Other  CHEST/LUNG:  [ x ] Clear to auscultation bilaterally, [ x ] Breath Sounds equal B/L  [  ] Poor effort, [x  ] No rales, [  x] mild rhonchi on right base [ x ] mild expiratory wheeze on right base   HEART:  [ x ] Regular rate, [  ] Tachycardia, [  ] Bradycardia, [ x ] Irregular, [x  ] No murmurs, No rubs, No gallops, [  ] PPM in place (Mfr:  )  ABDOMEN:  [ x ] Soft, [x  ] Nontender, [ x ] Nondistended, [ x ] No mass, [ x ] Bowel sounds present, [  ] Obese  NERVOUS SYSTEM:  [ x ] Alert & Oriented x3, [ x ] Nonfocal, [  ] Confusion, [  ] Encephalopathic, [  ] Sedated, [  ] Unable to assess, [  ] Dementia, [ x ] Other-contractures EXT & Hand B/L   EXTREMITIES:  [ x ] 2+ Peripheral Pulses, No clubbing, No cyanosis,  [  ] Edema B/L lower EXT, [  ] PVD stasis skin changes B/L lower EXT, [  ] Wound [ x ] spasticity of all 4 extremities with prominent contractures of extensors of hands b/l  LYMPH:  No lymphadenopathy noted  SKIN:  [ x ] No rashes or lesions, [  ] Pressure ulcers, [  ] Ecchymosis, [  ] Skin tears, [  ] Other    DIET: Diet, Regular (06-14-22 @ 14:17)      LABS:                        12.5   11.23 )-----------( 207      ( 21 Jun 2022 06:55 )             37.3       Ca    8.6        20 Jun 2022 07:53      PT/INR - ( 20 Jun 2022 07:53 )   PT: 17.0 sec;   INR: 1.45 ratio               Culture Results:   10,000 - 49,000 CFU/mL Pseudomonas aeruginosa  <10,000 CFU/ml Normal Urogenital carmen present (06-17 @ 12:29)  Culture Results:   No Growth Final (06-14 @ 16:30)  Culture Results:   No Growth Final (06-14 @ 16:30)      Culture - Urine (collected 17 Jun 2022 12:29)  Source: Clean Catch Clean Catch (Midstream)  Final Report (19 Jun 2022 12:06):    10,000 - 49,000 CFU/mL Pseudomonas aeruginosa    <10,000 CFU/ml Normal Urogenital carmen present  Organism: Pseudomonas aeruginosa (19 Jun 2022 12:06)  Organism: Pseudomonas aeruginosa (19 Jun 2022 12:06)    Culture - Blood (collected 14 Jun 2022 16:30)  Source: .Blood Blood-Peripheral  Final Report (19 Jun 2022 17:00):    No Growth Final    Culture - Blood (collected 14 Jun 2022 16:30)  Source: .Blood Blood-Peripheral  Final Report (19 Jun 2022 17:00):    No Growth Final         Anemia Panel:  Ferritin, Serum: 113 ng/mL (06-15-22 @ 10:55)      Thyroid Panel:      RADIOLOGY & ADDITIONAL TESTS: none      HEALTH ISSUES - PROBLEM Dx:  Atrial fibrillation    2019 novel coronavirus disease (COVID-19)    Asymptomatic bacteriuria    TBI (traumatic brain injury)    HTN (hypertension)    HLD (hyperlipidemia)    CAD (coronary artery disease)    Constipation    BPH (benign prostatic hyperplasia)    Major depression    Need for prophylactic measure            Consultant(s) Notes Reviewed:  [ x ] YES     Care Discussed with [X] Consultants  [ x ] Patient  [  ] Family [  ] HCP [ x ]   [  ] Social Service  [  x] RN, [  ] Physical Therapy,[  ] Palliative care team  DVT PPX: [  ] Lovenox, [  ] S C Heparin, [  ] Coumadin, [  ] Xarelto, [ x ] Eliquis, [  ] Pradaxa, [  ] IV Heparin drip, [  ] SCD [  ] Contraindication 2 to GI Bleed,[  ] Ambulation [  ] Contraindicated 2 to  bleed [  ] Contraindicated 2 to Brain Bleed  Advanced directive: [ x ] None, [  ] DNR/DNI Patient is a 72y old  Male who presents with a chief complaint of A Fib with RVR (20 Jun 2022 07:11)    HPI:  Pt is a 72 y.o. M with a PMH of HTN, AFIB, BPH, hx of TBI, HLD, CAD, peripheral neuropathy, found to be COVID+ who presented to the ED with CC of fatigue. Pt is not the best historian after having TBI 2/2 fall on AC, so history taken via phone from wife. Pt reports that this morning he woke up "feeling lousy" with a fever and a cough. Pt tested COVID + today. Pt restarted eliquis 2 days ago, and follows up with Dr. Goldsmith for cardiology while outpatient. Pt endorses fever, lethargy diminished sensation on hands and feet 2/2 neuropathy, loss of balance, cough, constipation (last BM yesterday, but before that 1 week prior). Denies CP, palpitations, n/v/d, abdominal pain, leg pain. as per wife pt had some friends who visited pt & pt also has 2 HHA .    In the ED:  T 101.2 F Oral  /90 RR 18 SpO2 98% on RA  WBC 11.44 INR 1.3 Glucose 154 Procal 0.11 COVID +  CT Head Non-Con: Mild to moderate chronic microvascular changes. 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   CXR: Lung Clear.  ECG: AFib RVR  2L NS Bolus x1, 1g IV Tylenol, 1g Rocephin (14 Jun 2022 13:13)    INTERVAL HPI:  6/15/22: Patient seen and examined at bedside. No acute events overnight. Patient on BB CCB w/ rate-controlled HR ~80's. On remdesivir for COVID. saturating well on RA. On eliquis for Afib/DVT. Endorses feeling better without significant dyspnea or cough, but has some fatigue/malaise. ON Remdisivir IVPB daily   6/16/22: Patient seen and examined at bedside. No acute events overnight. Rate controlled. HDS. On remdesivir saturating well on RA. Endourses cough but no dyspnea. "I Feel well." S/P IV Remdesivir daily x 3 dose.  6/17/22: Patient seen and examined at bedside. No acute events overnight. s/p remdesivir x3d saturating well on RA. Continued cough no dyspnea. "I feel well." rate controlled increased to cardizem 120 ER qd w/ STAT dose today. dc planning -> per , unable to obtain home health aide today, will attempt over weekend ".i want to go home"  6/18/22 Pt seen and examined at bedside. Pt states he feels "fine". Pt states he still has a dry cough. Also admits to nasal drip. Pt denies fever, chills, SOB, STACK, CP, palpitations. D/C planning.  6/19/22: Patient seen and examined at bedside. No new symptoms at this time feeling "fine." Persistent cough. dc planning today?  6/20/22: Patient seen and examined at bedside. Has no complaints this AM, states he feels well and wants to go home. Admits to intermittent cough. D/C planning home.  6/21/22: Patient seen and examined at bedside. No new complaints. He states that he worked with physical therapy earlier and it "did not go very well". Still admits to intermittent cough, otherwise endorses no new complaints. D/C planning.      OVERNIGHT EVENTS:  none    Home Medications:  aspirin 81 mg oral tablet: 1 tab(s) orally once a day (14 Jun 2022 13:52)  bethanechol 10 mg oral tablet: 1 tab(s) orally 3 times a day at (8am, 3pm, 10pm) (14 Jun 2022 13:52)  buPROPion 75 mg oral tablet: 1 tab(s) orally 2 times a day (10am, 10pm) (14 Jun 2022 13:52)  Eliquis 2.5 mg oral tablet: 1 tab(s) orally 2 times a day (10am, 10pm)t (14 Jun 2022 13:52)  gabapentin 300 mg oral capsule: 1 cap(s) orally 3 times a day (8am, 3pm, 10pm (14 Jun 2022 13:52)  latanoprost 0.005% ophthalmic solution: 1 drop(s) to each affected eye once a day (in the evening) (14 Jun 2022 13:52)  methylphenidate 10 mg oral tablet: 2 tab(s) orally once a day (in the morning) (14 Jun 2022 13:52)  rosuvastatin 10 mg oral tablet: 1 tab(s) orally once a day (14 Jun 2022 13:52)  tamsulosin 0.4 mg oral capsule: 1 cap(s) orally once a day (in the evening) (14 Jun 2022 13:52)  tiZANidine 4 mg oral tablet: 1 tab(s) orally 3 times a day (14 Jun 2022 13:52)  Vitamin D3 25 mcg (1000 intl units) oral tablet: 1 tab(s) orally once a day (14 Jun 2022 13:52)      MEDICATIONS  (STANDING):  ammonium lactate 12% Lotion 1 Application(s) Topical two times a day  apixaban 2.5 milliGRAM(s) Oral two times a day  artificial  tears Solution 2 Drop(s) Both EYES four times a day  aspirin  chewable 81 milliGRAM(s) Oral daily  atorvastatin 40 milliGRAM(s) Oral at bedtime  benzonatate 100 milliGRAM(s) Oral three times a day  bethanechol 10 milliGRAM(s) Oral three times a day  buPROPion XL (24-Hour) . 150 milliGRAM(s) Oral daily  cholecalciferol 1000 Unit(s) Oral daily  diltiazem    milliGRAM(s) Oral daily  fluticasone propionate 50 MICROgram(s)/spray Nasal Spray 1 Spray(s) Both Nostrils two times a day  gabapentin 300 milliGRAM(s) Oral three times a day  latanoprost 0.005% Ophthalmic Solution 1 Drop(s) Both EYES at bedtime  losartan 25 milliGRAM(s) Oral daily  methylphenidate 20 milliGRAM(s) Oral daily  polyethylene glycol 3350 17 Gram(s) Oral two times a day  senna 2 Tablet(s) Oral at bedtime  tamsulosin 0.4 milliGRAM(s) Oral at bedtime  tiZANidine 4 milliGRAM(s) Oral three times a day    MEDICATIONS  (PRN):  acetaminophen     Tablet .. 650 milliGRAM(s) Oral every 6 hours PRN Temp greater or equal to 38C (100.4F), Mild Pain (1 - 3)  aluminum hydroxide/magnesium hydroxide/simethicone Suspension 30 milliLiter(s) Oral every 4 hours PRN Dyspepsia  guaiFENesin Oral Liquid (Sugar-Free) 200 milliGRAM(s) Oral every 6 hours PRN Cough  melatonin 3 milliGRAM(s) Oral at bedtime PRN Insomnia  ondansetron Injectable 4 milliGRAM(s) IV Push every 8 hours PRN Nausea and/or Vomiting      Allergies    No Known Allergies    Intolerances        Social History:  Vaccinated for COVID 3/3, last shot in february with moderna. Ambulates without assistance- pt is bedbound and wheelchair bound. Pt is reliant on wife for ADLs. Denies alcohol, tobacco, or recreational drug use. (14 Jun 2022 13:13)      REVIEW OF SYSTEMS: I am fine.  CONSTITUTIONAL: No fever, No chills, No fatigue, No myalgia, No Body ache, No Weakness  EYES: No eye pain,  No visual disturbances, No discharge, NO Redness  ENMT:  No ear pain, No nose bleed, No vertigo; No sinus pain, NO throat pain, No Congestion  NECK: No pain, No stiffness  RESPIRATORY: No cough, NO wheezing, No  hemoptysis, NO  shortness of breath  CARDIOVASCULAR: No chest pain, palpitations  GASTROINTESTINAL: No abdominal pain, NO epigastric pain. No nausea, No vomiting; No diarrhea, No constipation. [x  ] BM  GENITOURINARY: No dysuria, No frequency, No urgency, No hematuria, NO incontinence  NEUROLOGICAL: No headaches, No dizziness, No numbness, No tingling, No tremors, No weakness  EXT: No Swelling, No Pain, No Edema  SKIN:  [ x ] No itching, burning, rashes, or lesions   MUSCULOSKELETAL: No joint pain ,No Jt swelling; No muscle pain, No back pain, No extremity pain  PSYCHIATRIC: No depression,  No anxiety,  No mood swings ,No difficulty sleeping at night  PAIN SCALE: [ x ] None  [  ] Other-  ROS Unable to obtain due to - [  ] Dementia  [  ] Lethargy [  ] Drowsy [  ] Sedated [  ] non verbal  REST OF REVIEW Of SYSTEM - [x  ] Normal    Vital Signs Last 24 Hrs  T(C): 36.7 (21 Jun 2022 06:22), Max: 36.7 (20 Jun 2022 14:23)  T(F): 98 (21 Jun 2022 06:22), Max: 98 (20 Jun 2022 14:23)  HR: 84 (21 Jun 2022 06:22) (83 - 84)  BP: 148/82 (21 Jun 2022 06:22) (143/78 - 148/82)  BP(mean): --  RR: 18 (21 Jun 2022 06:22) (18 - 18)  SpO2: 94% (21 Jun 2022 06:22) (94% - 94%)  Finger Stick        06-20 @ 07:01  -  06-21 @ 07:00  --------------------------------------------------------  IN: 888 mL / OUT: 276 mL / NET: 612 mL        PHYSICAL EXAM:  GENERAL:  [ x ] NAD, [  x] Well appearing, lying back in bed watching TV [  ] Agitated, [  ] Drowsy, [  ] Lethargy, [  ] Confused   HEAD:  [ x ] Normal, [  ] Other  EYES:  [ x ] EOMI, [ x ] PERRLA, [x  ] Conjunctiva and sclera clear normal, [  ] Other, [  ] Pallor, [  ] Discharge  ENMT:  [ x ] Normal, [ x ] Moist mucous membranes, [ x ] Good dentition, [ x ] No thrush  NECK:  [ x ] Supple, [ x ] No JVD, [ x ] Normal thyroid, [  ] Lymphadenopathy, [  ] Other  CHEST/LUNG:  [ x ] Clear to auscultation bilaterally, [ x ] Breath Sounds equal B/L  [  ] Poor effort, [x  ] No rales, [  x] mild rhonchi on right base [ x ] mild expiratory wheeze on right base   HEART:  [ x ] Regular rate, [  ] Tachycardia, [  ] Bradycardia, [ x ] Irregular, [x  ] No murmurs, No rubs, No gallops, [  ] PPM in place (Mfr:  )  ABDOMEN:  [ x ] Soft, [x  ] Nontender, [ x ] Nondistended, [ x ] No mass, [ x ] Bowel sounds present, [  ] Obese  NERVOUS SYSTEM:  [ x ] Alert & Oriented x3, [ x ] Nonfocal, [  ] Confusion, [  ] Encephalopathic, [  ] Sedated, [  ] Unable to assess, [  ] Dementia, [ x ] Other-contractures EXT & Hand B/L   EXTREMITIES:  [ x ] 2+ Peripheral Pulses, No clubbing, No cyanosis,  [  ] Edema B/L lower EXT, [  ] PVD stasis skin changes B/L lower EXT, [  ] Wound [ x ] spasticity of all 4 extremities with prominent contractures of extensors of hands b/l  LYMPH:  No lymphadenopathy noted  SKIN:  [ x ] No rashes or lesions, [  ] Pressure ulcers, [x  ] Ecchymosis, -upper ext [  ] Skin tears, [  ] Other    DIET: Diet, Regular (06-14-22 @ 14:17)      LABS:                        12.5   11.23 )-----------( 207      ( 21 Jun 2022 06:55 )             37.3       Ca    8.6        20 Jun 2022 07:53      PT/INR - ( 20 Jun 2022 07:53 )   PT: 17.0 sec;   INR: 1.45 ratio               Culture Results:   10,000 - 49,000 CFU/mL Pseudomonas aeruginosa  <10,000 CFU/ml Normal Urogenital carmen present (06-17 @ 12:29)  Culture Results:   No Growth Final (06-14 @ 16:30)  Culture Results:   No Growth Final (06-14 @ 16:30)      Culture - Urine (collected 17 Jun 2022 12:29)  Source: Clean Catch Clean Catch (Midstream)  Final Report (19 Jun 2022 12:06):    10,000 - 49,000 CFU/mL Pseudomonas aeruginosa    <10,000 CFU/ml Normal Urogenital carmen present  Organism: Pseudomonas aeruginosa (19 Jun 2022 12:06)  Organism: Pseudomonas aeruginosa (19 Jun 2022 12:06)    Culture - Blood (collected 14 Jun 2022 16:30)  Source: .Blood Blood-Peripheral  Final Report (19 Jun 2022 17:00):    No Growth Final    Culture - Blood (collected 14 Jun 2022 16:30)  Source: .Blood Blood-Peripheral  Final Report (19 Jun 2022 17:00):    No Growth Final         Anemia Panel:  Ferritin, Serum: 113 ng/mL (06-15-22 @ 10:55)      Thyroid Panel:      RADIOLOGY & ADDITIONAL TESTS: none      HEALTH ISSUES - PROBLEM Dx:  Atrial fibrillation    2019 novel coronavirus disease (COVID-19)    Asymptomatic bacteriuria    TBI (traumatic brain injury)    HTN (hypertension)    HLD (hyperlipidemia)    CAD (coronary artery disease)    Constipation    BPH (benign prostatic hyperplasia)    Major depression    Need for prophylactic measure        Consultant(s) Notes Reviewed:  [ x ] YES     Care Discussed with [X] Consultants  [ x ] Patient  [  ] Family [  ] HCP [ x ]   [  ] Social Service  [ x] RN, [  ] Physical Therapy,[  ] Palliative care team  DVT PPX: [  ] Lovenox, [  ] S C Heparin, [  ] Coumadin, [  ] Xarelto, [ x ] Eliquis, [  ] Pradaxa, [  ] IV Heparin drip, [  ] SCD [  ] Contraindication 2 to GI Bleed,[  ] Ambulation [  ] Contraindicated 2 to  bleed [  ] Contraindicated 2 to Brain Bleed  Advanced directive: [ x ] None, [  ] DNR/DNI

## 2022-06-21 NOTE — PHYSICAL THERAPY INITIAL EVALUATION ADULT - LEVEL OF INDEPENDENCE: SUPINE/SIT, REHAB EVAL
attempted but unable/dependent (less than 25% patients effort)
dependent (less than 25% patients effort)

## 2022-06-21 NOTE — PROGRESS NOTE ADULT - PROBLEM SELECTOR PLAN 4
chronic w/ severe persisting peripheral neuropathy 2/2 prior traumatic brain bleed on Eliquis  - CT Head Non-Con ->Mild to moderate chronic microvascular changes. 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   - Fall, aspiration, safety precautions  - mostly bed bound w/ muscle contractures  - c/w gabapentin 300 mg TID, buproprion 150 mg qd, tizanidine 4 mg TID, and ritalin qd  PT -> home chronic w/ severe persisting peripheral neuropathy 2/2 prior traumatic brain bleed on Eliquis  - CT Head Non-Con ->Mild to moderate chronic microvascular changes. 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   - Fall, aspiration, safety precautions  - mostly bed bound w/ muscle contractures  - c/w gabapentin 300 mg TID, buproprion 150 mg qd, tizanidine 4 mg TID, and ritalin qd  - PT recs: home pt chronic w/ severe persisting peripheral neuropathy 2/2 prior traumatic brain bleed on Eliquis  - CT Head Non-Con ->Mild to moderate chronic microvascular changes. 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   - Fall, aspiration, safety precautions  - mostly bed bound w/ muscle contractures  - c/w gabapentin 300 mg TID, buproprion 150 mg qd, tizanidine 4 mg TID, and ritalin qd  - PT recs: home pt, Pt uses OOB to W/c daily

## 2022-06-21 NOTE — PROGRESS NOTE ADULT - PROBLEM SELECTOR PLAN 2
COVID (+) - saturating well on RA  - c/w tylenol 650 mg po q6h prn fever  - dry cough: continue tessalon perles, continue Robitussin   - nasal drip: started Flonase   - c/w home Eliquis 2.5 mg BID  - s/p remdesivir 3 doses completed   - No steroids at this time  pulm following - Hao - will appreciate recs  ID - Nigel-robert for d/c COVID (+) - saturating well on RA  - c/w tylenol 650 mg po q6h prn fever  - dry cough: continue tessalon perles, continue Robitussin   - nasal drip: started Flonase   - c/w home Eliquis 2.5 mg BID  - s/p remdesivir 3 doses completed   - No steroids at this time  pulm following - Hao - will appreciate recs  ID - Nigel-stable for d/c-Mild leukocytosis -No work up as d/w Dr Patel

## 2022-06-21 NOTE — DIETITIAN INITIAL EVALUATION ADULT - PERTINENT MEDS FT
MEDICATIONS  (STANDING):  ammonium lactate 12% Lotion 1 Application(s) Topical two times a day  apixaban 2.5 milliGRAM(s) Oral two times a day  artificial  tears Solution 2 Drop(s) Both EYES four times a day  aspirin  chewable 81 milliGRAM(s) Oral daily  atorvastatin 40 milliGRAM(s) Oral at bedtime  benzonatate 100 milliGRAM(s) Oral three times a day  bethanechol 10 milliGRAM(s) Oral three times a day  buPROPion XL (24-Hour) . 150 milliGRAM(s) Oral daily  cholecalciferol 1000 Unit(s) Oral daily  diltiazem    milliGRAM(s) Oral daily  fluticasone propionate 50 MICROgram(s)/spray Nasal Spray 1 Spray(s) Both Nostrils two times a day  gabapentin 300 milliGRAM(s) Oral three times a day  latanoprost 0.005% Ophthalmic Solution 1 Drop(s) Both EYES at bedtime  losartan 25 milliGRAM(s) Oral daily  polyethylene glycol 3350 17 Gram(s) Oral two times a day  senna 2 Tablet(s) Oral at bedtime  tamsulosin 0.4 milliGRAM(s) Oral at bedtime  tiZANidine 4 milliGRAM(s) Oral three times a day    MEDICATIONS  (PRN):  acetaminophen     Tablet .. 650 milliGRAM(s) Oral every 6 hours PRN Temp greater or equal to 38C (100.4F), Mild Pain (1 - 3)  aluminum hydroxide/magnesium hydroxide/simethicone Suspension 30 milliLiter(s) Oral every 4 hours PRN Dyspepsia  guaiFENesin Oral Liquid (Sugar-Free) 200 milliGRAM(s) Oral every 6 hours PRN Cough  melatonin 3 milliGRAM(s) Oral at bedtime PRN Insomnia  ondansetron Injectable 4 milliGRAM(s) IV Push every 8 hours PRN Nausea and/or Vomiting

## 2022-06-21 NOTE — PHYSICAL THERAPY INITIAL EVALUATION ADULT - PLANNED THERAPY INTERVENTIONS, PT EVAL
Pt appears to at baseline dependent function, D/C PT services
Pt appears to at baseline dependent function, D/C PT services

## 2022-06-21 NOTE — PHYSICAL THERAPY INITIAL EVALUATION ADULT - BALANCE DISTURBANCE, IDENTIFIED IMPAIRMENT CONTRIBUTE, REHAB EVAL
impaired motor control/decreased ROM/decreased strength
impaired motor control/decreased ROM/decreased strength

## 2022-06-21 NOTE — PHYSICAL THERAPY INITIAL EVALUATION ADULT - ADDITIONAL COMMENTS
Pt is poor historian.  Per EMR, pt is primarily wheelchair/bedbound.
Pt is poor historian.  Per EMR, pt is primarily wheelchair/bedbound.

## 2022-06-22 LAB
ALBUMIN SERPL ELPH-MCNC: 2.8 G/DL — LOW (ref 3.3–5)
ALP SERPL-CCNC: 108 U/L — SIGNIFICANT CHANGE UP (ref 40–120)
ALT FLD-CCNC: 17 U/L — SIGNIFICANT CHANGE UP (ref 12–78)
AST SERPL-CCNC: 5 U/L — LOW (ref 15–37)
BILIRUB DIRECT SERPL-MCNC: 0.1 MG/DL — SIGNIFICANT CHANGE UP (ref 0–0.3)
BILIRUB INDIRECT FLD-MCNC: 0.3 MG/DL — SIGNIFICANT CHANGE UP (ref 0.2–1)
BILIRUB SERPL-MCNC: 0.4 MG/DL — SIGNIFICANT CHANGE UP (ref 0.2–1.2)
CREAT SERPL-MCNC: 1.1 MG/DL — SIGNIFICANT CHANGE UP (ref 0.5–1.3)
EGFR: 72 ML/MIN/1.73M2 — SIGNIFICANT CHANGE UP
HCT VFR BLD CALC: 38.5 % — LOW (ref 39–50)
HGB BLD-MCNC: 12.3 G/DL — LOW (ref 13–17)
INR BLD: 1.45 RATIO — HIGH (ref 0.88–1.16)
MCHC RBC-ENTMCNC: 26 PG — LOW (ref 27–34)
MCHC RBC-ENTMCNC: 31.9 GM/DL — LOW (ref 32–36)
MCV RBC AUTO: 81.4 FL — SIGNIFICANT CHANGE UP (ref 80–100)
NRBC # BLD: 0 /100 WBCS — SIGNIFICANT CHANGE UP (ref 0–0)
PLATELET # BLD AUTO: 217 K/UL — SIGNIFICANT CHANGE UP (ref 150–400)
PROT SERPL-MCNC: 5.8 G/DL — LOW (ref 6–8.3)
PROTHROM AB SERPL-ACNC: 17 SEC — HIGH (ref 10.5–13.4)
RBC # BLD: 4.73 M/UL — SIGNIFICANT CHANGE UP (ref 4.2–5.8)
RBC # FLD: 14.4 % — SIGNIFICANT CHANGE UP (ref 10.3–14.5)
WBC # BLD: 13.65 K/UL — HIGH (ref 3.8–10.5)
WBC # FLD AUTO: 13.65 K/UL — HIGH (ref 3.8–10.5)

## 2022-06-22 PROCEDURE — 99232 SBSQ HOSP IP/OBS MODERATE 35: CPT

## 2022-06-22 RX ADMIN — Medication 1 SPRAY(S): at 18:50

## 2022-06-22 RX ADMIN — LOSARTAN POTASSIUM 25 MILLIGRAM(S): 100 TABLET, FILM COATED ORAL at 06:48

## 2022-06-22 RX ADMIN — LATANOPROST 1 DROP(S): 0.05 SOLUTION/ DROPS OPHTHALMIC; TOPICAL at 21:59

## 2022-06-22 RX ADMIN — Medication 2 DROP(S): at 06:53

## 2022-06-22 RX ADMIN — BUPROPION HYDROCHLORIDE 150 MILLIGRAM(S): 150 TABLET, EXTENDED RELEASE ORAL at 12:30

## 2022-06-22 RX ADMIN — Medication 1 APPLICATION(S): at 06:53

## 2022-06-22 RX ADMIN — LATANOPROST 1 DROP(S): 0.05 SOLUTION/ DROPS OPHTHALMIC; TOPICAL at 00:41

## 2022-06-22 RX ADMIN — Medication 2 DROP(S): at 18:50

## 2022-06-22 RX ADMIN — TIZANIDINE 4 MILLIGRAM(S): 4 TABLET ORAL at 00:39

## 2022-06-22 RX ADMIN — APIXABAN 2.5 MILLIGRAM(S): 2.5 TABLET, FILM COATED ORAL at 18:44

## 2022-06-22 RX ADMIN — GABAPENTIN 300 MILLIGRAM(S): 400 CAPSULE ORAL at 06:48

## 2022-06-22 RX ADMIN — TAMSULOSIN HYDROCHLORIDE 0.4 MILLIGRAM(S): 0.4 CAPSULE ORAL at 21:59

## 2022-06-22 RX ADMIN — Medication 100 MILLIGRAM(S): at 06:48

## 2022-06-22 RX ADMIN — TIZANIDINE 4 MILLIGRAM(S): 4 TABLET ORAL at 22:00

## 2022-06-22 RX ADMIN — Medication 2 DROP(S): at 22:04

## 2022-06-22 RX ADMIN — Medication 1 APPLICATION(S): at 18:50

## 2022-06-22 RX ADMIN — Medication 1000 UNIT(S): at 12:30

## 2022-06-22 RX ADMIN — TIZANIDINE 4 MILLIGRAM(S): 4 TABLET ORAL at 06:49

## 2022-06-22 RX ADMIN — POLYETHYLENE GLYCOL 3350 17 GRAM(S): 17 POWDER, FOR SOLUTION ORAL at 06:48

## 2022-06-22 RX ADMIN — Medication 3 MILLIGRAM(S): at 21:58

## 2022-06-22 RX ADMIN — Medication 100 MILLIGRAM(S): at 14:52

## 2022-06-22 RX ADMIN — ATORVASTATIN CALCIUM 40 MILLIGRAM(S): 80 TABLET, FILM COATED ORAL at 21:59

## 2022-06-22 RX ADMIN — Medication 2 DROP(S): at 12:32

## 2022-06-22 RX ADMIN — Medication 81 MILLIGRAM(S): at 12:30

## 2022-06-22 RX ADMIN — Medication 10 MILLIGRAM(S): at 21:58

## 2022-06-22 RX ADMIN — Medication 120 MILLIGRAM(S): at 06:48

## 2022-06-22 RX ADMIN — GABAPENTIN 300 MILLIGRAM(S): 400 CAPSULE ORAL at 14:57

## 2022-06-22 RX ADMIN — POLYETHYLENE GLYCOL 3350 17 GRAM(S): 17 POWDER, FOR SOLUTION ORAL at 18:44

## 2022-06-22 RX ADMIN — Medication 100 MILLIGRAM(S): at 21:59

## 2022-06-22 RX ADMIN — Medication 2 DROP(S): at 00:42

## 2022-06-22 RX ADMIN — Medication 10 MILLIGRAM(S): at 14:52

## 2022-06-22 RX ADMIN — TIZANIDINE 4 MILLIGRAM(S): 4 TABLET ORAL at 14:53

## 2022-06-22 RX ADMIN — SENNA PLUS 2 TABLET(S): 8.6 TABLET ORAL at 21:58

## 2022-06-22 RX ADMIN — Medication 1 SPRAY(S): at 06:53

## 2022-06-22 RX ADMIN — APIXABAN 2.5 MILLIGRAM(S): 2.5 TABLET, FILM COATED ORAL at 06:49

## 2022-06-22 RX ADMIN — GABAPENTIN 300 MILLIGRAM(S): 400 CAPSULE ORAL at 22:03

## 2022-06-22 NOTE — PROGRESS NOTE ADULT - PROBLEM SELECTOR PLAN 4
chronic w/ severe persisting peripheral neuropathy 2/2 prior traumatic brain bleed on Eliquis  - CT Head Non-Con ->Mild to moderate chronic microvascular changes. 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   - Fall, aspiration, safety precautions  - mostly bed bound w/ muscle contractures  - c/w gabapentin 300 mg TID, buproprion 150 mg qd, tizanidine 4 mg TID, and ritalin qd  - PT recs: home pt

## 2022-06-22 NOTE — PROGRESS NOTE ADULT - PROBLEM SELECTOR PLAN 2
COVID (+) - saturating well on RA  - c/w tylenol 650 mg po q6h prn fever  - dry cough: continue tessalon perles, continue Robitussin   - nasal drip: started Flonase   - c/w home Eliquis 2.5 mg BID  - s/p remdesivir 3 doses completed   - No steroids at this time  pulm following - Hao - will appreciate recs  ID - Nigel-robert for d/c

## 2022-06-22 NOTE — PROGRESS NOTE ADULT - SUBJECTIVE AND OBJECTIVE BOX
*********CHARTING IN PROGRESS***********      Patient is a 72y old  Male who presents with a chief complaint of Infection due to severe acute respiratory syndrome coronavirus 2 (SARS-CoV-2)     (21 Jun 2022 13:31)    HPI:  Pt is a 72 y.o. M with a PMH of HTN, AFIB, BPH, hx of TBI, HLD, CAD, peripheral neuropathy, found to be COVID+ who presented to the ED with CC of fatigue. Pt is not the best historian after having TBI 2/2 fall on AC, so history taken via phone from wife. Pt reports that this morning he woke up "feeling lousy" with a fever and a cough. Pt tested COVID + today. Pt restarted eliquis 2 days ago, and follows up with Dr. Goldsmith for cardiology while outpatient. Pt endorses fever, lethargy diminished sensation on hands and feet 2/2 neuropathy, loss of balance, cough, constipation (last BM yesterday, but before that 1 week prior). Denies CP, palpitations, n/v/d, abdominal pain, leg pain. as per wife pt had some friends who visited pt & pt also has 2 HHA .    In the ED:  T 101.2 F Oral  /90 RR 18 SpO2 98% on RA  WBC 11.44 INR 1.3 Glucose 154 Procal 0.11 COVID +  CT Head Non-Con: Mild to moderate chronic microvascular changes. 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   CXR: Lung Clear.  ECG: AFib RVR  2L NS Bolus x1, 1g IV Tylenol, 1g Rocephin (14 Jun 2022 13:13)    INTERVAL HPI:      OVERNIGHT EVENTS:    Home Medications:  aspirin 81 mg oral tablet: 1 tab(s) orally once a day (14 Jun 2022 13:52)  bethanechol 10 mg oral tablet: 1 tab(s) orally 3 times a day at (8am, 3pm, 10pm) (14 Jun 2022 13:52)  buPROPion 75 mg oral tablet: 1 tab(s) orally 2 times a day (10am, 10pm) (14 Jun 2022 13:52)  Eliquis 2.5 mg oral tablet: 1 tab(s) orally 2 times a day (10am, 10pm)t (14 Jun 2022 13:52)  gabapentin 300 mg oral capsule: 1 cap(s) orally 3 times a day (8am, 3pm, 10pm (14 Jun 2022 13:52)  latanoprost 0.005% ophthalmic solution: 1 drop(s) to each affected eye once a day (in the evening) (14 Jun 2022 13:52)  methylphenidate 10 mg oral tablet: 2 tab(s) orally once a day (in the morning) (14 Jun 2022 13:52)  rosuvastatin 10 mg oral tablet: 1 tab(s) orally once a day (14 Jun 2022 13:52)  tamsulosin 0.4 mg oral capsule: 1 cap(s) orally once a day (in the evening) (14 Jun 2022 13:52)  tiZANidine 4 mg oral tablet: 1 tab(s) orally 3 times a day (14 Jun 2022 13:52)  Vitamin D3 25 mcg (1000 intl units) oral tablet: 1 tab(s) orally once a day (14 Jun 2022 13:52)      MEDICATIONS  (STANDING):  ammonium lactate 12% Lotion 1 Application(s) Topical two times a day  apixaban 2.5 milliGRAM(s) Oral two times a day  artificial  tears Solution 2 Drop(s) Both EYES four times a day  aspirin  chewable 81 milliGRAM(s) Oral daily  atorvastatin 40 milliGRAM(s) Oral at bedtime  benzonatate 100 milliGRAM(s) Oral three times a day  bethanechol 10 milliGRAM(s) Oral three times a day  buPROPion XL (24-Hour) . 150 milliGRAM(s) Oral daily  cholecalciferol 1000 Unit(s) Oral daily  diltiazem    milliGRAM(s) Oral daily  fluticasone propionate 50 MICROgram(s)/spray Nasal Spray 1 Spray(s) Both Nostrils two times a day  gabapentin 300 milliGRAM(s) Oral three times a day  latanoprost 0.005% Ophthalmic Solution 1 Drop(s) Both EYES at bedtime  losartan 25 milliGRAM(s) Oral daily  polyethylene glycol 3350 17 Gram(s) Oral two times a day  senna 2 Tablet(s) Oral at bedtime  tamsulosin 0.4 milliGRAM(s) Oral at bedtime  tiZANidine 4 milliGRAM(s) Oral three times a day    MEDICATIONS  (PRN):  acetaminophen     Tablet .. 650 milliGRAM(s) Oral every 6 hours PRN Temp greater or equal to 38C (100.4F), Mild Pain (1 - 3)  aluminum hydroxide/magnesium hydroxide/simethicone Suspension 30 milliLiter(s) Oral every 4 hours PRN Dyspepsia  guaiFENesin Oral Liquid (Sugar-Free) 200 milliGRAM(s) Oral every 6 hours PRN Cough  melatonin 3 milliGRAM(s) Oral at bedtime PRN Insomnia  ondansetron Injectable 4 milliGRAM(s) IV Push every 8 hours PRN Nausea and/or Vomiting      Allergies    No Known Allergies    Intolerances        Social History:  Vaccinated for COVID 3/3, last shot in february with moderna. Ambulates without assistance- pt is bedbound and wheelchair bound. Pt is reliant on wife for ADLs. Denies alcohol, tobacco, or recreational drug use. (14 Jun 2022 13:13)      REVIEW OF SYSTEMS:  CONSTITUTIONAL: No fever, No chills, No fatigue, No myalgia, No Body ache, No Weakness  EYES: No eye pain,  No visual disturbances, No discharge, NO Redness  ENMT:  No ear pain, No nose bleed, No vertigo; No sinus pain, NO throat pain, No Congestion  NECK: No pain, No stiffness  RESPIRATORY: No cough, NO wheezing, No  hemoptysis, NO  shortness of breath  CARDIOVASCULAR: No chest pain, palpitations  GASTROINTESTINAL: No abdominal pain, NO epigastric pain. No nausea, No vomiting; No diarrhea, No constipation. [  ] BM  GENITOURINARY: No dysuria, No frequency, No urgency, No hematuria, NO incontinence  NEUROLOGICAL: No headaches, No dizziness, No numbness, No tingling, No tremors, No weakness  EXT: No Swelling, No Pain, No Edema  SKIN:  [  ] No itching, burning, rashes, or lesions   MUSCULOSKELETAL: No joint pain ,No Jt swelling; No muscle pain, No back pain, No extremity pain  PSYCHIATRIC: No depression,  No anxiety,  No mood swings ,No difficulty sleeping at night  PAIN SCALE: [  ] None  [  ] Other-  ROS Unable to obtain due to - [  ] Dementia  [  ] Lethargy [  ] Drowsy [  ] Sedated [  ] non verbal  REST OF REVIEW Of SYSTEM - [  ] Normal     Vital Signs Last 24 Hrs  T(C): 37.1 (22 Jun 2022 05:42), Max: 37.1 (22 Jun 2022 05:42)  T(F): 98.7 (22 Jun 2022 05:42), Max: 98.7 (22 Jun 2022 05:42)  HR: 85 (22 Jun 2022 05:42) (83 - 85)  BP: 119/80 (22 Jun 2022 05:42) (119/80 - 156/95)  BP(mean): --  RR: 18 (22 Jun 2022 05:42) (18 - 18)  SpO2: 96% (22 Jun 2022 05:42) (94% - 96%)  Finger Stick        06-21 @ 07:01  -  06-22 @ 07:00  --------------------------------------------------------  IN: 237 mL / OUT: 1476 mL / NET: -1239 mL        PHYSICAL EXAM:  GENERAL:  [  ] NAD , [  ] well appearing, [  ] Agitated, [  ] Drowsy,  [  ] Lethargy, [  ] confused   HEAD:  [  ] Normal, [  ] Other  EYES:  [  ] EOMI, [  ] PERRLA, [  ] conjunctiva and sclera clear normal, [  ] Other,  [  ] Pallor,[  ] Discharge  ENMT:  [  ] Normal, [  ] Moist mucous membranes, [  ] Good dentition, [  ] No Thrush  NECK:  [  ] Supple, [  ] No JVD, [  ] Normal thyroid, [  ] Lymphadenopathy [  ] Other  CHEST/LUNG:  [  ] Clear to auscultation bilaterally, [  ] Breath Sounds equal B/L / Decrease, [  ] poor effort  [  ] No rales, [  ] No rhonchi  [  ]  No wheezing,   HEART:  [  ] Regular rate and rhythm, [  ] tachycardia, [  ] Bradycardia,  [  ] irregular  [  ] No murmurs, No rubs, No gallops, [  ] PPM in place (Mfr:  )  ABDOMEN:  [  ] Soft, [  ] Nontender, [  ] Nondistended, [  ] No mass, [  ] Bowel sounds present, [  ] obese  NERVOUS SYSTEM:  [  ] Alert & Oriented X3, [  ] Nonfocal  [  ] Confusion  [  ] Encephalopathic [  ] Sedated [  ] Unable to assess, [  ] Dementia [  ] Other-  EXTREMITIES: [  ] 2+ Peripheral Pulses, No clubbing, No cyanosis,  [  ] edema B/L lower EXT. [  ] PVD stasis skin changes B/L Lower EXT, [  ] wound  LYMPH: No lymphadenopathy noted  SKIN:  [  ] No rashes or lesions, [  ] Pressure Ulcers, [  ] ecchymosis, [  ] Skin Tears, [  ] Other    DIET: Diet, Regular (06-14-22 @ 14:17)      LABS:    22 Jun 2022 07:10    x      |  x      |  x      ----------------------------<  x      x       |  x      |  1.10     Ca    8.5        21 Jun 2022 06:55    TPro  5.8    /  Alb  2.8    /  TBili  0.4    /  DBili  0.1    /  AST  5      /  ALT  17     /  AlkPhos  108    22 Jun 2022 07:10    PT/INR - ( 22 Jun 2022 07:10 )   PT: 17.0 sec;   INR: 1.45 ratio               Culture Results:   10,000 - 49,000 CFU/mL Pseudomonas aeruginosa  <10,000 CFU/ml Normal Urogenital carmen present (06-17 @ 12:29)                  Culture - Urine (collected 17 Jun 2022 12:29)  Source: Clean Catch Clean Catch (Midstream)  Final Report (19 Jun 2022 12:06):    10,000 - 49,000 CFU/mL Pseudomonas aeruginosa    <10,000 CFU/ml Normal Urogenital carmen present  Organism: Pseudomonas aeruginosa (19 Jun 2022 12:06)  Organism: Pseudomonas aeruginosa (19 Jun 2022 12:06)         Anemia Panel:  Ferritin, Serum: 113 ng/mL (06-15-22 @ 10:55)      Thyroid Panel:                RADIOLOGY & ADDITIONAL TESTS:      HEALTH ISSUES - PROBLEM Dx:  Atrial fibrillation    2019 novel coronavirus disease (COVID-19)    Asymptomatic bacteriuria    TBI (traumatic brain injury)    HTN (hypertension)    HLD (hyperlipidemia)    CAD (coronary artery disease)    Constipation    BPH (benign prostatic hyperplasia)    Major depression    Need for prophylactic measure            Consultant(s) Notes Reviewed:  [  ] YES     Care Discussed with [X] Consultants  [  ] Patient  [  ] Family [  ] HCP [  ]   [  ] Social Service  [  ] RN, [  ] Physical Therapy,[  ] Palliative care team  DVT PPX: [  ] Lovenox, [  ] S C Heparin, [  ] Coumadin, [  ] Xarelto, [  ] Eliquis, [  ] Pradaxa, [  ] IV Heparin drip, [  ] SCD [  ] Contraindication 2 to GI Bleed,[  ] Ambulation [  ] Contraindicated 2 to  bleed [  ] Contraindicated 2 to Brain Bleed  Advanced directive: [  ] None, [  ] DNR/DNI Patient is a 72y old  Male who presents with a chief complaint of Infection due to severe acute respiratory syndrome coronavirus 2 (SARS-CoV-2)     (21 Jun 2022 13:31)    HPI:  Pt is a 72 y.o. M with a PMH of HTN, AFIB, BPH, hx of TBI, HLD, CAD, peripheral neuropathy, found to be COVID+ who presented to the ED with CC of fatigue. Pt is not the best historian after having TBI 2/2 fall on AC, so history taken via phone from wife. Pt reports that this morning he woke up "feeling lousy" with a fever and a cough. Pt tested COVID + today. Pt restarted eliquis 2 days ago, and follows up with Dr. Goldsmith for cardiology while outpatient. Pt endorses fever, lethargy diminished sensation on hands and feet 2/2 neuropathy, loss of balance, cough, constipation (last BM yesterday, but before that 1 week prior). Denies CP, palpitations, n/v/d, abdominal pain, leg pain. as per wife pt had some friends who visited pt & pt also has 2 HHA .    In the ED:  T 101.2 F Oral  /90 RR 18 SpO2 98% on RA  WBC 11.44 INR 1.3 Glucose 154 Procal 0.11 COVID +  CT Head Non-Con: Mild to moderate chronic microvascular changes. 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   CXR: Lung Clear.  ECG: AFib RVR  2L NS Bolus x1, 1g IV Tylenol, 1g Rocephin (14 Jun 2022 13:13)    INTERVAL HPI:  6/15/22: Patient seen and examined at bedside. No acute events overnight. Patient on BB CCB w/ rate-controlled HR ~80's. On remdesivir for COVID. saturating well on RA. On eliquis for Afib/DVT. Endorses feeling better without significant dyspnea or cough, but has some fatigue/malaise. ON Remdisivir IVPB daily   6/16/22: Patient seen and examined at bedside. No acute events overnight. Rate controlled. HDS. On remdesivir saturating well on RA. Endourses cough but no dyspnea. "I Feel well." S/P IV Remdesivir daily x 3 dose.  6/17/22: Patient seen and examined at bedside. No acute events overnight. s/p remdesivir x3d saturating well on RA. Continued cough no dyspnea. "I feel well." rate controlled increased to cardizem 120 ER qd w/ STAT dose today. dc planning -> per , unable to obtain home health aide today, will attempt over weekend ".i want to go home"  6/18/22 Pt seen and examined at bedside. Pt states he feels "fine". Pt states he still has a dry cough. Also admits to nasal drip. Pt denies fever, chills, SOB, STACK, CP, palpitations. D/C planning.  6/19/22: Patient seen and examined at bedside. No new symptoms at this time feeling "fine." Persistent cough. dc planning today?  6/20/22: Patient seen and examined at bedside. Has no complaints this AM, states he feels well and wants to go home. Admits to intermittent cough. D/C planning home.  6/21/22: Patient seen and examined at bedside. No new complaints. He states that he worked with physical therapy earlier and it "did not go very well". Still admits to intermittent cough, otherwise endorses no new complaints. D/C planning.  6/22/22: patient seen and examined at bedside. No new complaints. He states he's tired but otherwise feels well. Understands that there is a plan in place for him to likely go home tomorrow. Still admits to intermittent cough.    OVERNIGHT EVENTS:  none    Home Medications:  aspirin 81 mg oral tablet: 1 tab(s) orally once a day (14 Jun 2022 13:52)  bethanechol 10 mg oral tablet: 1 tab(s) orally 3 times a day at (8am, 3pm, 10pm) (14 Jun 2022 13:52)  buPROPion 75 mg oral tablet: 1 tab(s) orally 2 times a day (10am, 10pm) (14 Jun 2022 13:52)  Eliquis 2.5 mg oral tablet: 1 tab(s) orally 2 times a day (10am, 10pm)t (14 Jun 2022 13:52)  gabapentin 300 mg oral capsule: 1 cap(s) orally 3 times a day (8am, 3pm, 10pm (14 Jun 2022 13:52)  latanoprost 0.005% ophthalmic solution: 1 drop(s) to each affected eye once a day (in the evening) (14 Jun 2022 13:52)  methylphenidate 10 mg oral tablet: 2 tab(s) orally once a day (in the morning) (14 Jun 2022 13:52)  rosuvastatin 10 mg oral tablet: 1 tab(s) orally once a day (14 Jun 2022 13:52)  tamsulosin 0.4 mg oral capsule: 1 cap(s) orally once a day (in the evening) (14 Jun 2022 13:52)  tiZANidine 4 mg oral tablet: 1 tab(s) orally 3 times a day (14 Jun 2022 13:52)  Vitamin D3 25 mcg (1000 intl units) oral tablet: 1 tab(s) orally once a day (14 Jun 2022 13:52)      MEDICATIONS  (STANDING):  ammonium lactate 12% Lotion 1 Application(s) Topical two times a day  apixaban 2.5 milliGRAM(s) Oral two times a day  artificial  tears Solution 2 Drop(s) Both EYES four times a day  aspirin  chewable 81 milliGRAM(s) Oral daily  atorvastatin 40 milliGRAM(s) Oral at bedtime  benzonatate 100 milliGRAM(s) Oral three times a day  bethanechol 10 milliGRAM(s) Oral three times a day  buPROPion XL (24-Hour) . 150 milliGRAM(s) Oral daily  cholecalciferol 1000 Unit(s) Oral daily  diltiazem    milliGRAM(s) Oral daily  fluticasone propionate 50 MICROgram(s)/spray Nasal Spray 1 Spray(s) Both Nostrils two times a day  gabapentin 300 milliGRAM(s) Oral three times a day  latanoprost 0.005% Ophthalmic Solution 1 Drop(s) Both EYES at bedtime  losartan 25 milliGRAM(s) Oral daily  polyethylene glycol 3350 17 Gram(s) Oral two times a day  senna 2 Tablet(s) Oral at bedtime  tamsulosin 0.4 milliGRAM(s) Oral at bedtime  tiZANidine 4 milliGRAM(s) Oral three times a day    MEDICATIONS  (PRN):  acetaminophen     Tablet .. 650 milliGRAM(s) Oral every 6 hours PRN Temp greater or equal to 38C (100.4F), Mild Pain (1 - 3)  aluminum hydroxide/magnesium hydroxide/simethicone Suspension 30 milliLiter(s) Oral every 4 hours PRN Dyspepsia  guaiFENesin Oral Liquid (Sugar-Free) 200 milliGRAM(s) Oral every 6 hours PRN Cough  melatonin 3 milliGRAM(s) Oral at bedtime PRN Insomnia  ondansetron Injectable 4 milliGRAM(s) IV Push every 8 hours PRN Nausea and/or Vomiting      Allergies    No Known Allergies    Intolerances        Social History:  Vaccinated for COVID 3/3, last shot in february with moderna. Ambulates without assistance- pt is bedbound and wheelchair bound. Pt is reliant on wife for ADLs. Denies alcohol, tobacco, or recreational drug use. (14 Jun 2022 13:13)      REVIEW OF SYSTEMS: I am fine.  CONSTITUTIONAL: No fever, No chills, No fatigue, No myalgia, No Body ache, No Weakness  EYES: No eye pain,  No visual disturbances, No discharge, NO Redness  ENMT:  No ear pain, No nose bleed, No vertigo; No sinus pain, NO throat pain, No Congestion  NECK: No pain, No stiffness  RESPIRATORY: No cough, NO wheezing, No  hemoptysis, NO  shortness of breath  CARDIOVASCULAR: No chest pain, palpitations  GASTROINTESTINAL: No abdominal pain, NO epigastric pain. No nausea, No vomiting; No diarrhea, No constipation. [x  ] BM  GENITOURINARY: No dysuria, No frequency, No urgency, No hematuria, NO incontinence  NEUROLOGICAL: No headaches, No dizziness, No numbness, No tingling, No tremors, No weakness  EXT: No Swelling, No Pain, No Edema  SKIN:  [ x ] No itching, burning, rashes, or lesions   MUSCULOSKELETAL: No joint pain ,No Jt swelling; No muscle pain, No back pain, No extremity pain  PSYCHIATRIC: No depression,  No anxiety,  No mood swings ,No difficulty sleeping at night  PAIN SCALE: [ x ] None  [  ] Other-  ROS Unable to obtain due to - [  ] Dementia  [  ] Lethargy [  ] Drowsy [  ] Sedated [  ] non verbal  REST OF REVIEW Of SYSTEM - [x  ] Normal    Vital Signs Last 24 Hrs  T(C): 37.1 (22 Jun 2022 05:42), Max: 37.1 (22 Jun 2022 05:42)  T(F): 98.7 (22 Jun 2022 05:42), Max: 98.7 (22 Jun 2022 05:42)  HR: 85 (22 Jun 2022 05:42) (83 - 85)  BP: 119/80 (22 Jun 2022 05:42) (119/80 - 156/95)  BP(mean): --  RR: 18 (22 Jun 2022 05:42) (18 - 18)  SpO2: 96% (22 Jun 2022 05:42) (94% - 96%)  Finger Stick        06-21 @ 07:01  -  06-22 @ 07:00  --------------------------------------------------------  IN: 237 mL / OUT: 1476 mL / NET: -1239 mL        PHYSICAL EXAM:  GENERAL:  [ x ] NAD, [  x] Well appearing, lying back in bed watching TV [  ] Agitated, [  ] Drowsy, [  ] Lethargy, [  ] Confused   HEAD:  [ x ] Normal, [  ] Other  EYES:  [ x ] EOMI, [ x ] PERRLA, [x  ] Conjunctiva and sclera clear normal, [  ] Other, [  ] Pallor, [  ] Discharge  ENMT:  [ x ] Normal, [ x ] Moist mucous membranes, [ x ] Good dentition, [ x ] No thrush  NECK:  [ x ] Supple, [ x ] No JVD, [ x ] Normal thyroid, [  ] Lymphadenopathy, [  ] Other  CHEST/LUNG:  [ x ] Clear to auscultation bilaterally, [ x ] Breath Sounds equal B/L  [  ] Poor effort, [x  ] No rales, [  x] mild rhonchi on right base [ x ] mild expiratory wheeze on right base   HEART:  [ x ] Regular rate, [  ] Tachycardia, [  ] Bradycardia, [ x ] Irregular, [x  ] No murmurs, No rubs, No gallops, [  ] PPM in place (Mfr:  )  ABDOMEN:  [ x ] Soft, [x  ] Nontender, [ x ] Nondistended, [ x ] No mass, [ x ] Bowel sounds present, [  ] Obese  NERVOUS SYSTEM:  [ x ] Alert & Oriented x3, [ x ] Nonfocal, [  ] Confusion, [  ] Encephalopathic, [  ] Sedated, [  ] Unable to assess, [  ] Dementia, [ x ] Other-contractures EXT & Hand B/L   EXTREMITIES:  [ x ] 2+ Peripheral Pulses, No clubbing, No cyanosis,  [  ] Edema B/L lower EXT, [  ] PVD stasis skin changes B/L lower EXT, [  ] Wound [ x ] spasticity of all 4 extremities with prominent contractures of extensors of hands b/l  LYMPH:  No lymphadenopathy noted  SKIN:  [ x ] No rashes or lesions, [  ] Pressure ulcers, [  ] Ecchymosis, [  ] Skin tears, [  ] Other    DIET: Diet, Regular (06-14-22 @ 14:17)      LABS:    22 Jun 2022 07:10    x      |  x      |  x      ----------------------------<  x      x       |  x      |  1.10     Ca    8.5        21 Jun 2022 06:55    TPro  5.8    /  Alb  2.8    /  TBili  0.4    /  DBili  0.1    /  AST  5      /  ALT  17     /  AlkPhos  108    22 Jun 2022 07:10    PT/INR - ( 22 Jun 2022 07:10 )   PT: 17.0 sec;   INR: 1.45 ratio               Culture Results:   10,000 - 49,000 CFU/mL Pseudomonas aeruginosa  <10,000 CFU/ml Normal Urogenital carmen present (06-17 @ 12:29)                  Culture - Urine (collected 17 Jun 2022 12:29)  Source: Clean Catch Clean Catch (Midstream)  Final Report (19 Jun 2022 12:06):    10,000 - 49,000 CFU/mL Pseudomonas aeruginosa    <10,000 CFU/ml Normal Urogenital carmen present  Organism: Pseudomonas aeruginosa (19 Jun 2022 12:06)  Organism: Pseudomonas aeruginosa (19 Jun 2022 12:06)         Anemia Panel:  Ferritin, Serum: 113 ng/mL (06-15-22 @ 10:55)      Thyroid Panel:                RADIOLOGY & ADDITIONAL TESTS:      HEALTH ISSUES - PROBLEM Dx:  Atrial fibrillation    2019 novel coronavirus disease (COVID-19)    Asymptomatic bacteriuria    TBI (traumatic brain injury)    HTN (hypertension)    HLD (hyperlipidemia)    CAD (coronary artery disease)    Constipation    BPH (benign prostatic hyperplasia)    Major depression    Need for prophylactic measure            Consultant(s) Notes Reviewed:  [  ] YES     Care Discussed with [X] Consultants  [ x ] Patient  [  ] Family [  ] HCP [ x ]   [  ] Social Service  [  x] RN, [  ] Physical Therapy,[  ] Palliative care team  DVT PPX: [  ] Lovenox, [  ] S C Heparin, [  ] Coumadin, [  ] Xarelto, [ x ] Eliquis, [  ] Pradaxa, [  ] IV Heparin drip, [  ] SCD [  ] Contraindication 2 to GI Bleed,[  ] Ambulation [  ] Contraindicated 2 to  bleed [  ] Contraindicated 2 to Brain Bleed  Advanced directive: [ x ] None, [  ] DNR/DNI   no rash/no itching

## 2022-06-22 NOTE — PROGRESS NOTE ADULT - SUBJECTIVE AND OBJECTIVE BOX
Date/Time Patient Seen:  		  Referring MD:   Data Reviewed	       Patient is a 72y old  Male who presents with a chief complaint of Infection due to severe acute respiratory syndrome coronavirus 2 (SARS-CoV-2)     (21 Jun 2022 13:31)      Subjective/HPI     PAST MEDICAL & SURGICAL HISTORY:  TBI (traumatic brain injury)    HTN (hypertension)    Atrial fibrillation    DM (diabetes mellitus)    Peripheral neuropathy    Major depression    HLD (hyperlipidemia)    CAD (coronary artery disease)    BPH (benign prostatic hyperplasia)    No significant past surgical history          Medication list         MEDICATIONS  (STANDING):  ammonium lactate 12% Lotion 1 Application(s) Topical two times a day  apixaban 2.5 milliGRAM(s) Oral two times a day  artificial  tears Solution 2 Drop(s) Both EYES four times a day  aspirin  chewable 81 milliGRAM(s) Oral daily  atorvastatin 40 milliGRAM(s) Oral at bedtime  benzonatate 100 milliGRAM(s) Oral three times a day  bethanechol 10 milliGRAM(s) Oral three times a day  buPROPion XL (24-Hour) . 150 milliGRAM(s) Oral daily  cholecalciferol 1000 Unit(s) Oral daily  diltiazem    milliGRAM(s) Oral daily  fluticasone propionate 50 MICROgram(s)/spray Nasal Spray 1 Spray(s) Both Nostrils two times a day  gabapentin 300 milliGRAM(s) Oral three times a day  latanoprost 0.005% Ophthalmic Solution 1 Drop(s) Both EYES at bedtime  losartan 25 milliGRAM(s) Oral daily  polyethylene glycol 3350 17 Gram(s) Oral two times a day  senna 2 Tablet(s) Oral at bedtime  tamsulosin 0.4 milliGRAM(s) Oral at bedtime  tiZANidine 4 milliGRAM(s) Oral three times a day    MEDICATIONS  (PRN):  acetaminophen     Tablet .. 650 milliGRAM(s) Oral every 6 hours PRN Temp greater or equal to 38C (100.4F), Mild Pain (1 - 3)  aluminum hydroxide/magnesium hydroxide/simethicone Suspension 30 milliLiter(s) Oral every 4 hours PRN Dyspepsia  guaiFENesin Oral Liquid (Sugar-Free) 200 milliGRAM(s) Oral every 6 hours PRN Cough  melatonin 3 milliGRAM(s) Oral at bedtime PRN Insomnia  ondansetron Injectable 4 milliGRAM(s) IV Push every 8 hours PRN Nausea and/or Vomiting         Vitals log        ICU Vital Signs Last 24 Hrs  T(C): 37.1 (22 Jun 2022 05:42), Max: 37.1 (22 Jun 2022 05:42)  T(F): 98.7 (22 Jun 2022 05:42), Max: 98.7 (22 Jun 2022 05:42)  HR: 85 (22 Jun 2022 05:42) (83 - 85)  BP: 119/80 (22 Jun 2022 05:42) (119/80 - 156/95)  BP(mean): --  ABP: --  ABP(mean): --  RR: 18 (22 Jun 2022 05:42) (18 - 18)  SpO2: 96% (22 Jun 2022 05:42) (94% - 97%)           Input and Output:  I&O's Detail    20 Jun 2022 07:01  -  21 Jun 2022 07:00  --------------------------------------------------------  IN:    Oral Fluid: 888 mL  Total IN: 888 mL    OUT:    Stool (mL): 1 mL    Voided (mL): 275 mL  Total OUT: 276 mL    Total NET: 612 mL      21 Jun 2022 07:01  -  22 Jun 2022 06:20  --------------------------------------------------------  IN:    Oral Fluid: 237 mL  Total IN: 237 mL    OUT:    Voided (mL): 675 mL  Total OUT: 675 mL    Total NET: -438 mL          Lab Data                        12.5   11.23 )-----------( 207      ( 21 Jun 2022 06:55 )             37.3     06-21    142  |  107  |  12  ----------------------------<  142<H>  3.7   |  28  |  1.00    Ca    8.5      21 Jun 2022 06:55    TPro  5.6<L>  /  Alb  2.8<L>  /  TBili  0.5  /  DBili  0.1  /  AST  11<L>  /  ALT  17  /  AlkPhos  100  06-21            Review of Systems	      Objective     Physical Examination    heart s1s2  lung dec BS  adb soft      Pertinent Lab findings & Imaging      Geraldo:  NO   Adequate UO     I&O's Detail    20 Jun 2022 07:01  -  21 Jun 2022 07:00  --------------------------------------------------------  IN:    Oral Fluid: 888 mL  Total IN: 888 mL    OUT:    Stool (mL): 1 mL    Voided (mL): 275 mL  Total OUT: 276 mL    Total NET: 612 mL      21 Jun 2022 07:01  -  22 Jun 2022 06:20  --------------------------------------------------------  IN:    Oral Fluid: 237 mL  Total IN: 237 mL    OUT:    Voided (mL): 675 mL  Total OUT: 675 mL    Total NET: -438 mL               Discussed with:     Cultures:	        Radiology

## 2022-06-22 NOTE — PROGRESS NOTE ADULT - SUBJECTIVE AND OBJECTIVE BOX
[facility] -- Janak Edwards, Alexey العراقي, Agus Oglesby Savella, Goodger  Office # [phone]    Follow Up:  Afib with RVR      Subjective/Observations: Seen and evaluated lethargic and somewhat confused.  Comfortable on RA.  Non-complaining, though, refusing to eat.  No overnight events    REVIEW OF SYSTEMS: All other review of systems is negative unless indicated above  PAST MEDICAL & SURGICAL HISTORY:  TBI (traumatic brain injury)      HTN (hypertension)      Atrial fibrillation      Peripheral neuropathy      Major depression      HLD (hyperlipidemia)      CAD (coronary artery disease)      BPH (benign prostatic hyperplasia)    No significant past surgical history    MEDICATIONS  (STANDING):  ammonium lactate 12% Lotion 1 Application(s) Topical two times a day  apixaban 2.5 milliGRAM(s) Oral two times a day  artificial  tears Solution 2 Drop(s) Both EYES four times a day  aspirin  chewable 81 milliGRAM(s) Oral daily  atorvastatin 40 milliGRAM(s) Oral at bedtime  benzonatate 100 milliGRAM(s) Oral three times a day  bethanechol 10 milliGRAM(s) Oral three times a day  buPROPion XL (24-Hour) . 150 milliGRAM(s) Oral daily  cholecalciferol 1000 Unit(s) Oral daily  diltiazem    milliGRAM(s) Oral daily  fluticasone propionate 50 MICROgram(s)/spray Nasal Spray 1 Spray(s) Both Nostrils two times a day  gabapentin 300 milliGRAM(s) Oral three times a day  latanoprost 0.005% Ophthalmic Solution 1 Drop(s) Both EYES at bedtime  losartan 25 milliGRAM(s) Oral daily  polyethylene glycol 3350 17 Gram(s) Oral two times a day  senna 2 Tablet(s) Oral at bedtime  tamsulosin 0.4 milliGRAM(s) Oral at bedtime  tiZANidine 4 milliGRAM(s) Oral three times a day    MEDICATIONS  (PRN):  acetaminophen     Tablet .. 650 milliGRAM(s) Oral every 6 hours PRN Temp greater or equal to 38C (100.4F), Mild Pain (1 - 3)  aluminum hydroxide/magnesium hydroxide/simethicone Suspension 30 milliLiter(s) Oral every 4 hours PRN Dyspepsia  guaiFENesin Oral Liquid (Sugar-Free) 200 milliGRAM(s) Oral every 6 hours PRN Cough  melatonin 3 milliGRAM(s) Oral at bedtime PRN Insomnia  ondansetron Injectable 4 milliGRAM(s) IV Push every 8 hours PRN Nausea and/or Vomiting    Allergies    No Known Allergies    Intolerances    Vital Signs Last 24 Hrs  T(C): 37.1 (22 Jun 2022 05:42), Max: 37.1 (22 Jun 2022 05:42)  T(F): 98.7 (22 Jun 2022 05:42), Max: 98.7 (22 Jun 2022 05:42)  HR: 85 (22 Jun 2022 05:42) (83 - 85)  BP: 119/80 (22 Jun 2022 05:42) (119/80 - 156/95)  BP(mean): --  RR: 18 (22 Jun 2022 05:42) (18 - 18)  SpO2: 96% (22 Jun 2022 05:42) (94% - 96%)  I&O's Summary    21 Jun 2022 07:01  -  22 Jun 2022 07:00  --------------------------------------------------------  IN: 237 mL / OUT: 1476 mL / NET: -1239 mL     PHYSICAL EXAM:  TELE: Not on tele  Constitutional: NAD, awake but lethargic, well-developed  HEENT: Moist Mucous Membranes, Anicteric  Pulmonary: Non-labored, breath sounds are clear but diminished bilaterally, No wheezing, rales or rhonchi  Cardiovascular: IRRR, S1 and S2, No murmurs, rubs, gallops or clicks  Gastrointestinal: Bowel Sounds present, soft, nontender.   Lymph: No peripheral edema. No lymphadenopathy.  Skin: No visible rashes or ulcers.  Psych:  Mood & affect: Flat  LABS: All Labs Reviewed:                        12.3   13.65 )-----------( 217      ( 22 Jun 2022 09:04 )             38.5                         12.5   11.23 )-----------( 207      ( 21 Jun 2022 06:55 )             37.3                         12.7   9.17  )-----------( 191      ( 20 Jun 2022 07:53 )             39.9     22 Jun 2022 07:10    x      |  x      |  x      ----------------------------<  x      x       |  x      |  1.10   21 Jun 2022 06:55    142    |  107    |  12     ----------------------------<  142    3.7     |  28     |  1.00   20 Jun 2022 07:53    147    |  112    |  13     ----------------------------<  114    3.6     |  25     |  0.94     Ca    8.5        21 Jun 2022 06:55  Ca    8.6        20 Jun 2022 07:53    TPro  5.8    /  Alb  2.8    /  TBili  0.4    /  DBili  0.1    /  AST  5      /  ALT  17     /  AlkPhos  108    22 Jun 2022 07:10  TPro  5.6    /  Alb  2.8    /  TBili  0.5    /  DBili  0.1    /  AST  11     /  ALT  17     /  AlkPhos  100    21 Jun 2022 06:55  TPro  5.8    /  Alb  2.9    /  TBili  0.3    /  DBili  0.1    /  AST  11     /  ALT  17     /  AlkPhos  100    20 Jun 2022 07:53    PT/INR - ( 22 Jun 2022 07:10 )   PT: 17.0 sec;   INR: 1.45 ratio        ACC: [id number] EXAM:  ECHO TTE WO CON COMP W DOPP                          PROCEDURE DATE:  06/15/2022          INTERPRETATION:  INDICATION: Abnormal EKG  Sonographer KL    Blood Pressure 104/63    Height 165.1 cm     Weight 65.8 kg       BSA   1.73sq m    Dimensions:  LA 4.1       Normal Values: 2.0 - 4.0 cm  Ao 3.2        Normal Values: 2.0 - 3.8 cm  SEPTUM 1.3       Normal Values: 0.6 - 1.2 cm  PWT 1.2       Normal Values: 0.6 - 1.1 cm  LVIDd 4.3         Normal Values: 3.0 - 5.6 cm  LVIDs 2.7         Normal Values: 1.8 - 4.0 cm      OBSERVATIONS:  Mitral Valve: Trace to mild MR.  Aortic Valve/Aorta: normal trileaflet aortic valve.  Tricuspid Valve: normal with trace TR.  Pulmonic Valve: Trace PI  Left Atrium: Enlarged  Right Atrium: Not well-visualized  Left Ventricle: Mild left ventricular hypertrophy with normal systolic   function, estimated LVEF of 60%.  Right Ventricle: Grossly normal size and systolic function.  Pericardium: no significant pericardial effusion.  Pulmonary/RV Pressure: estimated PA systolic pressure of 18 mmHg    IMPRESSION:  Mild left ventricular hypertrophy with normal systolic function,   estimated LVEF of 60%.  Grossly normal RV size and systolic function.  Left atrial enlargement  Normal trileaflet aortic valve, without AI.  Trace to mild MR  Trace TR.  No significant pericardial effusion.    --- End of Report ---    KLAUS MUNIZ MD; Attending Cardiologist  This document has been electronically signed. Jun 16 2022  4:54PM    ACC: [id number] EXAM:  XR CHEST PORTABLE URGENT 1V                          PROCEDURE DATE:  06/14/2022      INTERPRETATION:  AP semierect chest on June 14, 2020 at 11:05 AM. Patient   has sepsis.    Heart magnified by technique.    Lungs remain clear.    Chest is similar to May 22.    IMPRESSION: No acute finding or change.    --- End of Report ---    SUSU TODD MD; Attending Radiologist  This document has been electronically signed. Jun 14 2022 12:34PM    Ventricular Rate 123 BPM    QRS Duration 78 ms    Q-T Interval 298 ms    QTC Calculation(Bazett) 426 ms    R Axis 200 degrees    T Axis 5 degrees    Diagnosis Line Atrial fibrillation with rapid ventricular response  Right superior axis deviation  Right ventricular hypertrophy  Septal infarct (cited on or before 22-MAY-2022)  Abnormal ECG  When compared with ECG of 22-MAY-2022 14:51,  No significant change was found  Confirmed by TOMMY OGLESBY ([id number]) on 6/14/2022 12:39:22 PM

## 2022-06-22 NOTE — PROGRESS NOTE ADULT - SUBJECTIVE AND OBJECTIVE BOX
[facility] DIVISION of INFECTIOUS DISEASE  Ovidio Manning MD PhD, Jojo Boggs MD, Hafsa Bhardwaj MD, Danica Goldsmith MD, Samy Terry MD  and providing coverage with Meet Perea MD  Providing Infectious Disease Consultations at [facility], [facility], [facility]'s      Office# [phone] to schedule follow up appointments  Answering Service for urgent calls or New Consults [phone]  Cell# to text for urgent issues Ovidio Manning [phone]     Infectious diseases progress note:    MELBA PARMAR is a 72y y. o. Male patient    COVID Patient    Allergies    No Known Allergies    Intolerances        ANTIBIOTICS/RELEVANT:  antimicrobials    immunologic:    OTHER:  acetaminophen     Tablet .. 650 milliGRAM(s) Oral every 6 hours PRN  aluminum hydroxide/magnesium hydroxide/simethicone Suspension 30 milliLiter(s) Oral every 4 hours PRN  ammonium lactate 12% Lotion 1 Application(s) Topical two times a day  apixaban 2.5 milliGRAM(s) Oral two times a day  artificial  tears Solution 2 Drop(s) Both EYES four times a day  aspirin  chewable 81 milliGRAM(s) Oral daily  atorvastatin 40 milliGRAM(s) Oral at bedtime  benzonatate 100 milliGRAM(s) Oral three times a day  bethanechol 10 milliGRAM(s) Oral three times a day  buPROPion XL (24-Hour) . 150 milliGRAM(s) Oral daily  cholecalciferol 1000 Unit(s) Oral daily  diltiazem    milliGRAM(s) Oral daily  fluticasone propionate 50 MICROgram(s)/spray Nasal Spray 1 Spray(s) Both Nostrils two times a day  gabapentin 300 milliGRAM(s) Oral three times a day  guaiFENesin Oral Liquid (Sugar-Free) 200 milliGRAM(s) Oral every 6 hours PRN  latanoprost 0.005% Ophthalmic Solution 1 Drop(s) Both EYES at bedtime  losartan 25 milliGRAM(s) Oral daily  melatonin 3 milliGRAM(s) Oral at bedtime PRN  ondansetron Injectable 4 milliGRAM(s) IV Push every 8 hours PRN  polyethylene glycol 3350 17 Gram(s) Oral two times a day  senna 2 Tablet(s) Oral at bedtime  tamsulosin 0.4 milliGRAM(s) Oral at bedtime  tiZANidine 4 milliGRAM(s) Oral three times a day      Objective:  Vital Signs Last 24 Hrs  T(C): 37.1 (22 Jun 2022 05:42), Max: 37.1 (22 Jun 2022 05:42)  T(F): 98.7 (22 Jun 2022 05:42), Max: 98.7 (22 Jun 2022 05:42)  HR: 85 (22 Jun 2022 05:42) (83 - 85)  BP: 119/80 (22 Jun 2022 05:42) (119/80 - 142/78)  BP(mean): --  RR: 18 (22 Jun 2022 05:42) (18 - 18)  SpO2: 96% (22 Jun 2022 05:42) (95% - 96%)    T(C): 37.1 (06-22-22 @ 05:42), Max: 37.1 (06-22-22 @ 05:42)  T(C): 37.1 (06-22-22 @ 05:42), Max: 37.1 (06-22-22 @ 05:42)  T(C): 37.1 (06-22-22 @ 05:42), Max: 37.1 (06-22-22 @ 05:42)    PHYSICAL EXAM:  HEENT: NC atraumatic  Neck: supple  Respiratory: no accessory muscle use, breathing comfortably  Cardiovascular: distant  Gastrointestinal: normal appearing, nondistended  Extremities: no clubbing, no cyanosis,        LABS:                          12.3   13.65 )-----------( 217      ( 22 Jun 2022 09:04 )             38.5       WBC  13.65 06-22 @ 09:04  11.23 06-21 @ 06:55  9.17 06-20 @ 07:53  7.62 06-19 @ 08:30  8.16 06-18 @ 08:32  6.94 06-17 @ 07:55  8.47 06-16 @ 07:49      06-22    x   |  x   |  x   ----------------------------<  x   x    |  x   |  1.10    Ca    8.5      21 Jun 2022 06:55    TPro  5.8<L>  /  Alb  2.8<L>  /  TBili  0.4  /  DBili  0.1  /  AST  5<L>  /  ALT  17  /  AlkPhos  108  06-22      Creatinine, Serum: 1.10 mg/dL (06-22-22 @ 07:10)  Creatinine, Serum: 1.00 mg/dL (06-21-22 @ 06:55)  Creatinine, Serum: 0.94 mg/dL (06-20-22 @ 07:53)  Creatinine, Serum: 0.92 mg/dL (06-19-22 @ 08:30)  Creatinine, Serum: 0.90 mg/dL (06-18-22 @ 08:32)  Creatinine, Serum: 0.87 mg/dL (06-17-22 @ 07:55)  Creatinine, Serum: 0.92 mg/dL (06-16-22 @ 16:58)  Creatinine, Serum: 0.99 mg/dL (06-16-22 @ 07:49)      PT/INR - ( 22 Jun 2022 07:10 )   PT: 17.0 sec;   INR: 1.45 ratio                   COVID RISK SCORE  Auto Neutrophil #: 5.47 K/uL (06-19-22 @ 08:30)  Auto Lymphocyte #: 0.98 K/uL (06-19-22 @ 08:30)  Auto Neutrophil #: 4.66 K/uL (06-17-22 @ 07:55)  Auto Lymphocyte #: 1.05 K/uL (06-17-22 @ 07:55)  Auto Neutrophil #: 6.28 K/uL (06-16-22 @ 07:49)  Auto Lymphocyte #: 0.90 K/uL (06-16-22 @ 07:49)  Auto Neutrophil #: 9.72 K/uL (06-14-22 @ 11:03)  Auto Lymphocyte #: 0.80 K/uL (06-14-22 @ 11:03)    Lactate, Blood: 1.3 mmol/L (06-14-22 @ 11:03)    Auto Eosinophil #: 0.38 K/uL (06-19-22 @ 08:30)  Auto Eosinophil #: 0.48 K/uL (06-17-22 @ 07:55)  Auto Eosinophil #: 0.27 K/uL (06-16-22 @ 07:49)  Auto Eosinophil #: 0.11 K/uL (06-14-22 @ 11:03)      Sedimentation Rate, Erythrocyte: 2 mm/hr (06-14-22 @ 11:03)    Procalcitonin, Serum: 0.11 ng/mL (06-14-22 @ 11:03)            Ferritin, Serum: 113 ng/mL (06-15-22 @ 10:55)        INR: 1.45 ratio (06-22-22 @ 07:10)  INR: 1.51 ratio (06-21-22 @ 06:55)  INR: 1.45 ratio (06-20-22 @ 07:53)  INR: 1.45 ratio (06-19-22 @ 08:30)  INR: 1.29 ratio (06-18-22 @ 08:32)  INR: 1.32 ratio (06-17-22 @ 07:55)  INR: 1.36 ratio (06-16-22 @ 16:58)  INR: 1.36 ratio (06-16-22 @ 07:49)  INR: 1.33 ratio (06-15-22 @ 08:04)  INR: 1.26 ratio (06-14-22 @ 16:29)  Activated Partial Thromboplastin Time: 37.0 sec (06-14-22 @ 11:03)  INR: 1.30 ratio (06-14-22 @ 11:03)    D-Dimer Assay, Quantitative: 154 ng/mL DDU (06-15-22 @ 08:04)  D-Dimer Assay, Quantitative: <150 ng/mL DDU (06-14-22 @ 16:29)        MICROBIOLOGY:              SARS-CoV-2: Detected (14 Jun 2022 11:19)  COVID-19 PCR: Detected (14 Jun 2022 11:03)      RADIOLOGY & ADDITIONAL STUDIES:

## 2022-06-22 NOTE — PROGRESS NOTE ADULT - PROBLEM SELECTOR PLAN 1
likely 2/2 fever 2/2 COVID - rate-controlled - improved  on CCB,BB - on AC eliquis 2.5mg bid  - c/w Cardizem  qd w/ hold parameters -> a 30 day prescription was written to your local pharm  - c/w Losartan 25 mg 1 tab qd -> a 30 day prescription was written to your local pharm  - DC'ed lopressor for soft BP, rates well controlled  - DC'ed hydralazine given soft bp  - continuous tele monitoring DC'ed  - No meaningful evidence of volume overload.  - Previous TTE shows EF 60-65% on 7/2020  - TTE this admission -> mild L ventricular hypertrophy w/ LVEF 60%  - Continue ASA 81mg and Rosuvastatin 10mg   - Eplerenone on hold  cardio following - Dr Edwards /DR MARTÍNEZ Goldsmith d/w about BP  Meds  -plan to [place] long term care agency  rep Elida placed order to reinstate home health aids for Monday 6/20. Family had difficulty getting private hire. If private hire is found for today patient can be d/aileen, otherwise likely d/c on thursday at 2pm

## 2022-06-22 NOTE — PROGRESS NOTE ADULT - ATTENDING COMMENTS
Pt is a 72 y.o. M with a PMH of HTN, AFIB, BPH, hx of TBI, HLD, CAD, peripheral neuropathy, found to be COVID+ who is being admitted for management of AFib with RVR., COVID 19 + infection.  Pt seen, examined, case & care plan d/w pt, residents at detail.  D/W Wife at  detail, on Phone today.  Cardiology- follow up DR Edwards group- Restart Losartan 25 mg daily  ID DR Manning -Remdesivir 3 dose completed as per ID  Pulmonary-DR Bui -supportive care, stable for d/c  Aspiration precautions.   COVID -Isolation  AM labs   PO diet.   -Pt is stable for D/C Home   -D/W case management , Pt's HHA  not available until Thursday..   Total care time is 40 minutes .

## 2022-06-23 VITALS
HEART RATE: 83 BPM | DIASTOLIC BLOOD PRESSURE: 77 MMHG | SYSTOLIC BLOOD PRESSURE: 125 MMHG | OXYGEN SATURATION: 97 % | RESPIRATION RATE: 18 BRPM | TEMPERATURE: 98 F

## 2022-06-23 LAB
ALBUMIN SERPL ELPH-MCNC: 2.6 G/DL — LOW (ref 3.3–5)
ALP SERPL-CCNC: 97 U/L — SIGNIFICANT CHANGE UP (ref 30–120)
ALT FLD-CCNC: 13 U/L DA — SIGNIFICANT CHANGE UP (ref 10–60)
AST SERPL-CCNC: 16 U/L — SIGNIFICANT CHANGE UP (ref 10–40)
BILIRUB DIRECT SERPL-MCNC: 0.1 MG/DL — SIGNIFICANT CHANGE UP (ref 0–0.3)
BILIRUB INDIRECT FLD-MCNC: 0.3 MG/DL — SIGNIFICANT CHANGE UP (ref 0.2–1)
BILIRUB SERPL-MCNC: 0.4 MG/DL — SIGNIFICANT CHANGE UP (ref 0.2–1.2)
CREAT SERPL-MCNC: 1.12 MG/DL — SIGNIFICANT CHANGE UP (ref 0.5–1.3)
EGFR: 71 ML/MIN/1.73M2 — SIGNIFICANT CHANGE UP
INR BLD: 1.51 RATIO — HIGH (ref 0.88–1.16)
PROT SERPL-MCNC: 5.6 G/DL — LOW (ref 6–8.3)
PROTHROM AB SERPL-ACNC: 17.7 SEC — HIGH (ref 10.5–13.4)

## 2022-06-23 PROCEDURE — 0225U NFCT DS DNA&RNA 21 SARSCOV2: CPT

## 2022-06-23 PROCEDURE — 87040 BLOOD CULTURE FOR BACTERIA: CPT

## 2022-06-23 PROCEDURE — 84145 PROCALCITONIN (PCT): CPT

## 2022-06-23 PROCEDURE — 85730 THROMBOPLASTIN TIME PARTIAL: CPT

## 2022-06-23 PROCEDURE — 80061 LIPID PANEL: CPT

## 2022-06-23 PROCEDURE — 93005 ELECTROCARDIOGRAM TRACING: CPT

## 2022-06-23 PROCEDURE — 97162 PT EVAL MOD COMPLEX 30 MIN: CPT

## 2022-06-23 PROCEDURE — 82248 BILIRUBIN DIRECT: CPT

## 2022-06-23 PROCEDURE — 80076 HEPATIC FUNCTION PANEL: CPT

## 2022-06-23 PROCEDURE — 87186 SC STD MICRODIL/AGAR DIL: CPT

## 2022-06-23 PROCEDURE — 97110 THERAPEUTIC EXERCISES: CPT

## 2022-06-23 PROCEDURE — 87086 URINE CULTURE/COLONY COUNT: CPT

## 2022-06-23 PROCEDURE — 85610 PROTHROMBIN TIME: CPT

## 2022-06-23 PROCEDURE — 83735 ASSAY OF MAGNESIUM: CPT

## 2022-06-23 PROCEDURE — 70450 CT HEAD/BRAIN W/O DYE: CPT | Mod: MA

## 2022-06-23 PROCEDURE — 80053 COMPREHEN METABOLIC PANEL: CPT

## 2022-06-23 PROCEDURE — 96375 TX/PRO/DX INJ NEW DRUG ADDON: CPT

## 2022-06-23 PROCEDURE — 83605 ASSAY OF LACTIC ACID: CPT

## 2022-06-23 PROCEDURE — 80048 BASIC METABOLIC PNL TOTAL CA: CPT

## 2022-06-23 PROCEDURE — 83036 HEMOGLOBIN GLYCOSYLATED A1C: CPT

## 2022-06-23 PROCEDURE — 85652 RBC SED RATE AUTOMATED: CPT

## 2022-06-23 PROCEDURE — 84100 ASSAY OF PHOSPHORUS: CPT

## 2022-06-23 PROCEDURE — 82565 ASSAY OF CREATININE: CPT

## 2022-06-23 PROCEDURE — 82728 ASSAY OF FERRITIN: CPT

## 2022-06-23 PROCEDURE — 71045 X-RAY EXAM CHEST 1 VIEW: CPT

## 2022-06-23 PROCEDURE — 81001 URINALYSIS AUTO W/SCOPE: CPT

## 2022-06-23 PROCEDURE — 93306 TTE W/DOPPLER COMPLETE: CPT

## 2022-06-23 PROCEDURE — 85379 FIBRIN DEGRADATION QUANT: CPT

## 2022-06-23 PROCEDURE — 99285 EMERGENCY DEPT VISIT HI MDM: CPT | Mod: 25

## 2022-06-23 PROCEDURE — 85027 COMPLETE CBC AUTOMATED: CPT

## 2022-06-23 PROCEDURE — 87077 CULTURE AEROBIC IDENTIFY: CPT

## 2022-06-23 PROCEDURE — 86803 HEPATITIS C AB TEST: CPT

## 2022-06-23 PROCEDURE — 96374 THER/PROPH/DIAG INJ IV PUSH: CPT

## 2022-06-23 PROCEDURE — 86140 C-REACTIVE PROTEIN: CPT

## 2022-06-23 PROCEDURE — 87635 SARS-COV-2 COVID-19 AMP PRB: CPT

## 2022-06-23 PROCEDURE — 85025 COMPLETE CBC W/AUTO DIFF WBC: CPT

## 2022-06-23 PROCEDURE — 36415 COLL VENOUS BLD VENIPUNCTURE: CPT

## 2022-06-23 PROCEDURE — 94640 AIRWAY INHALATION TREATMENT: CPT

## 2022-06-23 PROCEDURE — 99232 SBSQ HOSP IP/OBS MODERATE 35: CPT

## 2022-06-23 RX ADMIN — Medication 81 MILLIGRAM(S): at 12:34

## 2022-06-23 RX ADMIN — Medication 120 MILLIGRAM(S): at 05:45

## 2022-06-23 RX ADMIN — APIXABAN 2.5 MILLIGRAM(S): 2.5 TABLET, FILM COATED ORAL at 05:45

## 2022-06-23 RX ADMIN — Medication 1 SPRAY(S): at 05:51

## 2022-06-23 RX ADMIN — Medication 2 DROP(S): at 05:51

## 2022-06-23 RX ADMIN — POLYETHYLENE GLYCOL 3350 17 GRAM(S): 17 POWDER, FOR SOLUTION ORAL at 05:46

## 2022-06-23 RX ADMIN — LOSARTAN POTASSIUM 25 MILLIGRAM(S): 100 TABLET, FILM COATED ORAL at 05:45

## 2022-06-23 RX ADMIN — GABAPENTIN 300 MILLIGRAM(S): 400 CAPSULE ORAL at 05:44

## 2022-06-23 RX ADMIN — BUPROPION HYDROCHLORIDE 150 MILLIGRAM(S): 150 TABLET, EXTENDED RELEASE ORAL at 12:34

## 2022-06-23 RX ADMIN — Medication 1 APPLICATION(S): at 05:51

## 2022-06-23 RX ADMIN — Medication 100 MILLIGRAM(S): at 05:45

## 2022-06-23 RX ADMIN — Medication 10 MILLIGRAM(S): at 05:45

## 2022-06-23 RX ADMIN — TIZANIDINE 4 MILLIGRAM(S): 4 TABLET ORAL at 05:46

## 2022-06-23 RX ADMIN — Medication 1000 UNIT(S): at 12:34

## 2022-06-23 RX ADMIN — Medication 2 DROP(S): at 12:34

## 2022-06-23 RX ADMIN — Medication 200 MILLIGRAM(S): at 12:33

## 2022-06-23 NOTE — PROGRESS NOTE ADULT - REASON FOR ADMISSION
covid
Afib w/ RVR + COVID
Afib w/ RVR; COVID (+)
Afib w/ RVR + COVID
Afib w/ RVR + COVID
Rapid A Fib , COVID +
A Fib with RVR
A Fib with RVR

## 2022-06-23 NOTE — PROGRESS NOTE ADULT - SUBJECTIVE AND OBJECTIVE BOX
Date/Time Patient Seen:  		  Referring MD:   Data Reviewed	       Patient is a 72y old  Male who presents with a chief complaint of covid (22 Jun 2022 13:20)      Subjective/HPI     PAST MEDICAL & SURGICAL HISTORY:  TBI (traumatic brain injury)    HTN (hypertension)    Atrial fibrillation    DM (diabetes mellitus)    Peripheral neuropathy    Major depression    HLD (hyperlipidemia)    CAD (coronary artery disease)    BPH (benign prostatic hyperplasia)    No significant past surgical history          Medication list         MEDICATIONS  (STANDING):  ammonium lactate 12% Lotion 1 Application(s) Topical two times a day  apixaban 2.5 milliGRAM(s) Oral two times a day  artificial  tears Solution 2 Drop(s) Both EYES four times a day  aspirin  chewable 81 milliGRAM(s) Oral daily  atorvastatin 40 milliGRAM(s) Oral at bedtime  benzonatate 100 milliGRAM(s) Oral three times a day  bethanechol 10 milliGRAM(s) Oral three times a day  buPROPion XL (24-Hour) . 150 milliGRAM(s) Oral daily  cholecalciferol 1000 Unit(s) Oral daily  diltiazem    milliGRAM(s) Oral daily  fluticasone propionate 50 MICROgram(s)/spray Nasal Spray 1 Spray(s) Both Nostrils two times a day  gabapentin 300 milliGRAM(s) Oral three times a day  latanoprost 0.005% Ophthalmic Solution 1 Drop(s) Both EYES at bedtime  losartan 25 milliGRAM(s) Oral daily  polyethylene glycol 3350 17 Gram(s) Oral two times a day  senna 2 Tablet(s) Oral at bedtime  tamsulosin 0.4 milliGRAM(s) Oral at bedtime  tiZANidine 4 milliGRAM(s) Oral three times a day    MEDICATIONS  (PRN):  acetaminophen     Tablet .. 650 milliGRAM(s) Oral every 6 hours PRN Temp greater or equal to 38C (100.4F), Mild Pain (1 - 3)  aluminum hydroxide/magnesium hydroxide/simethicone Suspension 30 milliLiter(s) Oral every 4 hours PRN Dyspepsia  guaiFENesin Oral Liquid (Sugar-Free) 200 milliGRAM(s) Oral every 6 hours PRN Cough  melatonin 3 milliGRAM(s) Oral at bedtime PRN Insomnia  ondansetron Injectable 4 milliGRAM(s) IV Push every 8 hours PRN Nausea and/or Vomiting         Vitals log        ICU Vital Signs Last 24 Hrs  T(C): 36.4 (23 Jun 2022 05:49), Max: 36.4 (23 Jun 2022 05:49)  T(F): 97.6 (23 Jun 2022 05:49), Max: 97.6 (23 Jun 2022 05:49)  HR: 85 (23 Jun 2022 05:49) (85 - 86)  BP: 128/85 (23 Jun 2022 05:49) (128/75 - 128/85)  BP(mean): --  ABP: --  ABP(mean): --  RR: 17 (23 Jun 2022 05:49) (17 - 18)  SpO2: 94% (23 Jun 2022 05:49) (94% - 96%)           Input and Output:  I&O's Detail    21 Jun 2022 07:01  -  22 Jun 2022 07:00  --------------------------------------------------------  IN:    Oral Fluid: 237 mL  Total IN: 237 mL    OUT:    Stool (mL): 1 mL    Voided (mL): 1475 mL  Total OUT: 1476 mL    Total NET: -1239 mL      22 Jun 2022 07:01  -  23 Jun 2022 06:07  --------------------------------------------------------  IN:  Total IN: 0 mL    OUT:    Stool (mL): 2 mL    Voided (mL): 800 mL  Total OUT: 802 mL    Total NET: -802 mL          Lab Data                        12.3   13.65 )-----------( 217      ( 22 Jun 2022 09:04 )             38.5     06-22    x   |  x   |  x   ----------------------------<  x   x    |  x   |  1.10    Ca    8.5      21 Jun 2022 06:55    TPro  5.8<L>  /  Alb  2.8<L>  /  TBili  0.4  /  DBili  0.1  /  AST  5<L>  /  ALT  17  /  AlkPhos  108  06-22            Review of Systems	      Objective     Physical Examination    heart s1s2  lung dec BS  abd soft      Pertinent Lab findings & Imaging      Geraldo:  NO   Adequate UO     I&O's Detail    21 Jun 2022 07:01  -  22 Jun 2022 07:00  --------------------------------------------------------  IN:    Oral Fluid: 237 mL  Total IN: 237 mL    OUT:    Stool (mL): 1 mL    Voided (mL): 1475 mL  Total OUT: 1476 mL    Total NET: -1239 mL      22 Jun 2022 07:01  -  23 Jun 2022 06:07  --------------------------------------------------------  IN:  Total IN: 0 mL    OUT:    Stool (mL): 2 mL    Voided (mL): 800 mL  Total OUT: 802 mL    Total NET: -802 mL               Discussed with:     Cultures:	        Radiology

## 2022-06-23 NOTE — PROGRESS NOTE ADULT - ASSESSMENT
72 y.o. 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    Afib RVR/CAD  - Afib RVR, likely, in the setting of COVID.   HR has been controlled  - Continue CCB and Eliquis  - BP stable, continue ARB at low dose  - No anginal complaints.  Continue ASA and statin  - Monitor and replete lytes, keep K>4, Mg>2.  - No meaningful evidence of volume overload.  No O2 requirement.  Encourage PO fluid intake as he appears dry.  He is refusing to eat  - TTE showed EF 60%, normal RVF, LAE, no significant valvular disease or pericardial effusion    - Initiate incentive spirometer   - D/C planning per primary.  Would increase activity    - Will continue to follow as inpatient    Allison Perez DNP, NP-C  Cardiology   Spectra #[phone]  
72 y.o. M with a PMH of HTN, AFIB, BPH, hx of TBI, HLD, CAD, peripheral neuropathy, found to be COVID+ who presented to the ED with CC of fatigue    covid  HTN  OA  OP  AF  HLD  CAD  Neuropathy  hx of TBI  Weakness - Fatigue - viral syndrome    cm follow up reviewed  vs noted  labs reviewed  on RA    cxr clear   vs noted  labs reviewed  CT head noted  assist with needs - ADL  cvs rx regimen and BP control - HTN - AF - HLD - CAD  rate and rhythm control - AF  on AC for AF -   HOB elev  GOC discussion  tolerating RA at present  isolation precs  ACAP prn for sx management  Robitussin prn for sx management
72 y.o. M with a PMH of HTN, AFIB, BPH, hx of TBI, HLD, CAD, peripheral neuropathy, found to be COVID+ who presented to the ED with CC of fatigue    covid  HTN  OA  OP  AF  HLD  CAD  Neuropathy  hx of TBI  Weakness - Fatigue - viral syndrome    low grade temp  vs noted  labs reviewed      cxr clear   vs noted  labs reviewed  CT head noted  assist with needs - ADL  cvs rx regimen and BP control - HTN - AF - HLD - CAD  rate and rhythm control - AF  on AC for AF -   will check D Dimer  HOB elev  GOC discussion  tolerating RA at present  isolation precs  ACAP prn for sx management  Robitussin prn for sx management  
72 y.o. M with a PMH of HTN, AFIB, BPH, hx of TBI, HLD, CAD, peripheral neuropathy, found to be COVID+ who presented to the ED with CC of fatigue. Symptom onset 6/14, Vaccinated w Moderna    RECOMMENDATIONS  6/14-on RA, early in disease course, Remdesivir started, avoid steroids at this stage, on apixaban  6/15- continue current therapy  6/16--today as day 3 and last day of remdesivir, pt doing well     - can look at dc planning    Thank you for consulting us and involving us in the management of this most interesting and challenging case.  Please call us at [phone] or text me directly on my cell#[phone] with any concerns or further questions.  
72 y.o. M with a PMH of HTN, AFIB, BPH, hx of TBI, HLD, CAD, peripheral neuropathy, found to be COVID+ who presented to the ED with CC of fatigue. Symptom onset 6/14, Vaccinated w Moderna    RECOMMENDATIONS  6/14-on RA, early in disease course, Remdesivir started, avoid steroids at this stage, on apixaban  6/15- continue current therapy  6/16--today as day 3 and last day of remdesivir, pt doing well    -noted leukocytosis and no concerns   - can look at dc planning    Thank you for consulting us and involving us in the management of this most interesting and challenging case.  Please call us at [phone] or text me directly on my cell#[phone] with any concerns or further questions.  
72 y.o. M with a PMH of HTN, AFIB, BPH, hx of TBI, HLD, CAD, peripheral neuropathy, found to be COVID+ who presented to the ED with CC of fatigue. Symptom onset 6/14, Vaccinated w Moderna    RECOMMENDATIONS  6/14-on RA, early in disease course, Remdesivir started, avoid steroids at this stage, on apixaban  6/15- continue current therapy  6/16--today as day 3 and last day of remdesivir, pt doing well    can look at dc planning    Thank you for consulting us and involving us in the management of this most interesting and challenging case.  We will follow along in the care of this patient. Please call us at [phone] or text me directly on my cell# at [phone] with any concerns.  
72 y.o. 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    Afib RVR  - Afib RVR, likely, in the setting of Covid.  Now rate-controlled  - Can D/C tele  - EKG shows Afib with RVR, no ischemic changes  - No meaningful evidence of volume overload.  No O2 requirement  - TTE showed EF 60%, normal RVF, LAE, no significant valvular disease or pericardial effusion  - Continue Eliquis.  Continue Cardizem CD 120mg.  Uptitrate as necessary  - No c/o angina.  Continue ASA 81mg and statin   - BP is stable and controlled.  Continue Losartan 25 mg daily  - Monitor and replete lytes, keep K>4, Mg>2.    Will continue to follow    Allison Perez DNP, NP-C  Cardiology   Spectra #[phone]       
72 y.o. 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    Afib RVR  - Afib RVR, likely, in the setting of Covid.  Remains rate-controlled  - EKG shows Afib with RVR, no ischemic changes  - No meaningful evidence of volume overload.  No O2 requirement  - TTE showed EF 60%, normal RVF, LAE, no significant valvular disease or pericardial effusion  - Continue Eliquis.  Continue Cardizem CD 120mg.  Uptitrate as necessary  - No c/o angina.  Continue ASA 81mg and statin   - BP remains stable and controlled.  Continue Losartan 25 mg daily; uptitrate to home dose as needed  - Monitor and replete lytes, keep K>4, Mg>2.    Will continue to follow  Possible D/C 6/20    Allison Perez DNP, NP-C  Cardiology   Spectra #[phone]       
72 y.o. 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    Afib RVR  - Afib RVR, likely, in the setting of Covid.  Remains rate-controlled per flow sheet  - EKG showed Afib with RVR, no ischemic changes  - No meaningful evidence of volume overload.  No O2 requirement.  He is now intravascularly depleted.  Encourage PO fluid intake  - TTE showed EF 60%, normal RVF, LAE, no significant valvular disease or pericardial effusion  - Continue Eliquis.  Continue Cardizem CD 120mg.  Uptitrate as necessary  - No c/o angina.  Continue ASA 81mg and statin   - BP remains stable and controlled.  Continue Losartan 25 mg daily; uptitrate to home dose as needed  - Monitor and replete lytes, keep K>4, Mg>2.    Initiate incentive spirometer.  OOB to chair with assistance.  Patient for D/C planning  Will continue to follow as inpatient    Allison Perez DNP, NP-C  Cardiology   Spectra #[phone]       
72 y.o. 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    Afib RVR, HTN, HLD  - Patient presenting with Afib RVR in the setting of covid  -HR 80-90s  - EKG shows Afib with RVR   - No meaningful evidence of volume overload.  - Previous TTE shows EF 60-65% on 7/2020  - TTE results pending   - continue cardizem 120mg for rate control  - Continue ASA 81mg and Rosuvastatin 10mg   -BP is stable  -can start to resume home medications as tolerated,   -initiated losartan low dose of 25mg po qd  - Eplerenone on hold  -hold hydralazine for now  -thromboembolism on eliquis bid     - Monitor and replete lytes, keep K>4, Mg>2.  - Other cardiovascular workup will depend on clinical course.  Lorin Dent, GUNNAR  [id number]
72 y.o. 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    Afib RVR/CAD  - Afib RVR, likely, in the setting of COVID.   HR has been controlled; off tele.  - Continue CCB and Eliquis  - BP stable, continue ARB  - No anginal complaints.  Continue ASA and statin  - Monitor and replete lytes, keep K>4, Mg>2.  - No meaningful evidence of volume overload.  No O2 requirement.  He is now intravascularly depleted.  Encourage PO fluid intake.  He is refusing to eat  - TTE showed EF 60%, normal RVF, LAE, no significant valvular disease or pericardial effusion    - D/C planning per primary   - Will continue to follow as inpatient    Allison Perez DNP, NP-C  Cardiology   Spectra #[phone]    
72 y.o. M with a PMH of HTN, AFIB, BPH, hx of TBI, HLD, CAD, peripheral neuropathy, found to be COVID+ who presented to the ED with CC of fatigue    covid  HTN  OA  OP  AF  HLD  CAD  Neuropathy  hx of TBI  Weakness - Fatigue - viral syndrome    completed Remdesivir -   VS noted  LABS reviewed  CM follow up    cxr clear   vs noted  labs reviewed  CT head noted  assist with needs - ADL  cvs rx regimen and BP control - HTN - AF - HLD - CAD  rate and rhythm control - AF  on AC for AF -   HOB elev  GOC discussion  tolerating RA at present  isolation precs  ACAP prn for sx management  Robitussin prn for sx management  
72 y.o. M with a PMH of HTN, AFIB, BPH, hx of TBI, HLD, CAD, peripheral neuropathy, found to be COVID+ who presented to the ED with CC of fatigue    covid  HTN  OA  OP  AF  HLD  CAD  Neuropathy  hx of TBI  Weakness - Fatigue - viral syndrome    plan for DC monday  cm follow up  vs noted  on RA    cxr clear   vs noted  labs reviewed  CT head noted  assist with needs - ADL  cvs rx regimen and BP control - HTN - AF - HLD - CAD  rate and rhythm control - AF  on AC for AF -   HOB elev  GOC discussion  tolerating RA at present  isolation precs  ACAP prn for sx management  Robitussin prn for sx management
72 y.o. M with a PMH of HTN, AFIB, BPH, hx of TBI, HLD, CAD, peripheral neuropathy, found to be COVID+ who presented to the ED with CC of fatigue    covid  HTN  OA  OP  AF  HLD  CAD  Neuropathy  hx of TBI  Weakness - Fatigue - viral syndrome    vs noted  on RA  labs reviewed  on Remdesivir  Cx neg  D dimer neg    cxr clear   vs noted  labs reviewed  CT head noted  assist with needs - ADL  cvs rx regimen and BP control - HTN - AF - HLD - CAD  rate and rhythm control - AF  on AC for AF -   HOB elev  GOC discussion  tolerating RA at present  isolation precs  ACAP prn for sx management  Robitussin prn for sx management
72 y.o. M with a PMH of HTN, AFIB, BPH, hx of TBI, HLD, CAD, peripheral neuropathy, found to be COVID+ who presented to the ED with CC of fatigue. Symptom onset 6/14, Vaccinated w Moderna    RECOMMENDATIONS  6/14-on RA, early in disease course, Remdesivir started, avoid steroids at this stage, on apixaban  6/15- continue current therapy  6/16--today as day 3 and last day of remdesivir, pt doing well    -noted leukocytosis and no concerns   - can look at dc planning    From an ID standpoint no further requirement for inpatient status for the management of ID issues. Fine with discharge from ID standpoint when other medical issues no longer require inpatient care and social issues allow for a safe discharge plan. To schedule an outpatient ID follow up appointment please call our office at [phone]    Thank you for consulting us and involving us in the management of this most interesting and challenging case.  Please call us at [phone] or text me directly on my cell#[phone] with any concerns or further questions.    
72 y.o. M with a PMH of HTN, AFIB, BPH, hx of TBI, HLD, CAD, peripheral neuropathy, found to be COVID+ who presented to the ED with CC of fatigue. Symptom onset 6/14, Vaccinated w Moderna    RECOMMENDATIONS  6/14-on RA, early in disease course, Remdesivir started, avoid steroids at this stage, on apixaban  6/15- continue current therapy  6/16--today as day 3 and last day of remdesivir, pt doing well    6/17 - can look at dc planning    Thank you for consulting us and involving us in the management of this most interesting and challenging case.  Please call us at [phone] or text me directly on my cell#[phone] with any concerns or further questions.  
Pt is a 72 y.o. M with a PMH of HTN, AFIB, BPH, hx of TBI, HLD, CAD, peripheral neuropathy, found to be COVID+ who is being admitted for management of AFib with RVR.  Afib RVR, HTN, HLD  - Patient presenting with Afib RVR in the setting of covid, overnight tele af controlled rates 60-70's    - EKG shows Afib with RVR   - No meaningful evidence of volume overload.  - Previous TTE shows EF 60-65% on 7/2020  - TTE ordered   - continue cardizem although has not been getting 2/2 bp- can decrease to 30 every 6 hours for now   -  hold losartan and hydralazine given soft bp , can hold lopressor for now has not been getting 2/2 soft bp   - Continue ASA 81mg and Rosuvastatin 10mg   - Eplerenone on hold   -thromboembolism on eliquis bid     - Monitor and replete lytes, keep K>4, Mg>2.  - Other cardiovascular workup will depend on clinical course.  Faviola Jane FNP-C  Cardiology NP  [id number] [phone]              
Pt is a 72 y.o. M with a PMH of HTN, AFIB, BPH, hx of TBI, HLD, CAD, peripheral neuropathy, found to be COVID+ who is being admitted for management of AFib with RVR., COVID 19 + infection.
72 y.o. 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    Afib RVR  - Afib RVR, likely, in the setting of COVID.   - HR 80-90's per flow sheet, now off tele   - BP stable   - continue ASA, statin, Eliquis, CCB, ARB   - Monitor and replete lytes, keep K>4, Mg>2.    - EKG showed Afib with RVR, no ischemic changes, denies anginal complaints   - No meaningful evidence of volume overload.  No O2 requirement.  He is now intravascularly depleted.  Encourage PO fluid intake  - TTE showed EF 60%, normal RVF, LAE, no significant valvular disease or pericardial effusion    - D/C planning per primary   - Will continue to follow as inpatient    ALEISHA Olivera  Nurse Practitioner - Cardiology   Spectra #[phone]  
72 y.o. M with a PMH of HTN, AFIB, BPH, hx of TBI, HLD, CAD, peripheral neuropathy, found to be COVID+ who presented to the ED with CC of fatigue    covid  HTN  OA  OP  AF  HLD  CAD  Neuropathy  hx of TBI  Weakness - Fatigue - viral syndrome    cm follow up reviewed  vs noted  labs reviewed  on RA    cxr clear   vs noted  labs reviewed  CT head noted  assist with needs - ADL  cvs rx regimen and BP control - HTN - AF - HLD - CAD  rate and rhythm control - AF  on AC for AF -   HOB elev  GOC discussion  tolerating RA at present  isolation precs  ACAP prn for sx management  Robitussin prn for sx management
72 y.o. M with a PMH of HTN, AFIB, BPH, hx of TBI, HLD, CAD, peripheral neuropathy, found to be COVID+ who presented to the ED with CC of fatigue    covid  HTN  OA  OP  AF  HLD  CAD  Neuropathy  hx of TBI  Weakness - Fatigue - viral syndrome    completed Remdesivir -   VS noted  LABS reviewed  CM follow up    cxr clear   vs noted  labs reviewed  CT head noted  assist with needs - ADL  cvs rx regimen and BP control - HTN - AF - HLD - CAD  rate and rhythm control - AF  on AC for AF -   HOB elev  GOC discussion  tolerating RA at present  isolation precs  ACAP prn for sx management  Robitussin prn for sx management  
72 y.o. 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    Afib RVR, HTN, HLD  - Patient presenting with Afib RVR in the setting of covid, overnight tele af controlled 70-80bpm,   - EKG shows Afib with RVR   - No meaningful evidence of volume overload.  - Previous TTE shows EF 60-65% on 7/2020  - TTE results pending   - continue cardizem 30mg po q 6 hours   -  continue to hold losartan and hydralazine given soft bp - Continue ASA 81mg and Rosuvastatin 10mg   - Eplerenone on hold   -thromboembolism on eliquis bid     - Monitor and replete lytes, keep K>4, Mg>2.  - Other cardiovascular workup will depend on clinical course.  Faviola Jane FNP-C  Cardiology NP  [facility] [phone]              
Pt is a 72 y.o. M with a PMH of HTN, AFIB, BPH, hx of TBI, HLD, CAD, peripheral neuropathy, found to be COVID+ who is being admitted for management of AFib with RVR., COVID 19 + infection.

## 2022-06-23 NOTE — PROGRESS NOTE ADULT - SUBJECTIVE AND OBJECTIVE BOX
[facility] DIVISION of INFECTIOUS DISEASE  Ovidio Manning MD PhD, Jojo Boggs MD, Hafsa Bhardwaj MD, Danica Goldsmith MD, Samy Terry MD  and providing coverage with Meet Perea MD  Providing Infectious Disease Consultations at [facility], [facility], [facility]'s      Office# [phone] to schedule follow up appointments  Answering Service for urgent calls or New Consults [phone]  Cell# to text for urgent issues Ovidio Manning [phone]     Infectious diseases progress note:    MELBA PARMAR is a 72y y. o. Male patient    COVID Patient    Allergies    No Known Allergies    Intolerances        ANTIBIOTICS/RELEVANT:  antimicrobials    immunologic:    OTHER:  acetaminophen     Tablet .. 650 milliGRAM(s) Oral every 6 hours PRN  aluminum hydroxide/magnesium hydroxide/simethicone Suspension 30 milliLiter(s) Oral every 4 hours PRN  ammonium lactate 12% Lotion 1 Application(s) Topical two times a day  apixaban 2.5 milliGRAM(s) Oral two times a day  artificial  tears Solution 2 Drop(s) Both EYES four times a day  aspirin  chewable 81 milliGRAM(s) Oral daily  atorvastatin 40 milliGRAM(s) Oral at bedtime  benzonatate 100 milliGRAM(s) Oral three times a day  bethanechol 10 milliGRAM(s) Oral three times a day  buPROPion XL (24-Hour) . 150 milliGRAM(s) Oral daily  cholecalciferol 1000 Unit(s) Oral daily  diltiazem    milliGRAM(s) Oral daily  fluticasone propionate 50 MICROgram(s)/spray Nasal Spray 1 Spray(s) Both Nostrils two times a day  gabapentin 300 milliGRAM(s) Oral three times a day  guaiFENesin Oral Liquid (Sugar-Free) 200 milliGRAM(s) Oral every 6 hours PRN  latanoprost 0.005% Ophthalmic Solution 1 Drop(s) Both EYES at bedtime  losartan 25 milliGRAM(s) Oral daily  melatonin 3 milliGRAM(s) Oral at bedtime PRN  ondansetron Injectable 4 milliGRAM(s) IV Push every 8 hours PRN  polyethylene glycol 3350 17 Gram(s) Oral two times a day  senna 2 Tablet(s) Oral at bedtime  tamsulosin 0.4 milliGRAM(s) Oral at bedtime  tiZANidine 4 milliGRAM(s) Oral three times a day      Objective:  Vital Signs Last 24 Hrs  T(C): 36.6 (23 Jun 2022 12:23), Max: 36.6 (23 Jun 2022 12:23)  T(F): 97.9 (23 Jun 2022 12:23), Max: 97.9 (23 Jun 2022 12:23)  HR: 83 (23 Jun 2022 12:23) (83 - 86)  BP: 125/77 (23 Jun 2022 12:23) (125/77 - 128/85)  BP(mean): --  RR: 18 (23 Jun 2022 12:23) (17 - 18)  SpO2: 97% (23 Jun 2022 12:23) (94% - 97%)    T(C): 36.6 (06-23-22 @ 12:23), Max: 37.1 (06-22-22 @ 05:42)  T(C): 36.6 (06-23-22 @ 12:23), Max: 37.1 (06-22-22 @ 05:42)  T(C): 36.6 (06-23-22 @ 12:23), Max: 37.1 (06-22-22 @ 05:42)    PHYSICAL EXAM:  HEENT: NC atraumatic  Neck: supple  Respiratory: no accessory muscle use, breathing comfortably  Cardiovascular: distant  Gastrointestinal: normal appearing, nondistended  Extremities: no clubbing, no cyanosis,        LABS:                          12.3   13.65 )-----------( 217      ( 22 Jun 2022 09:04 )             38.5       WBC  13.65 06-22 @ 09:04  11.23 06-21 @ 06:55  9.17 06-20 @ 07:53  7.62 06-19 @ 08:30  8.16 06-18 @ 08:32  6.94 06-17 @ 07:55      06-23    x   |  x   |  x   ----------------------------<  x   x    |  x   |  1.12      TPro  5.6<L>  /  Alb  2.6<L>  /  TBili  0.4  /  DBili  0.1  /  AST  16  /  ALT  13  /  AlkPhos  97  06-23      Creatinine, Serum: 1.12 mg/dL (06-23-22 @ 09:38)  Creatinine, Serum: 1.10 mg/dL (06-22-22 @ 07:10)  Creatinine, Serum: 1.00 mg/dL (06-21-22 @ 06:55)  Creatinine, Serum: 0.94 mg/dL (06-20-22 @ 07:53)  Creatinine, Serum: 0.92 mg/dL (06-19-22 @ 08:30)  Creatinine, Serum: 0.90 mg/dL (06-18-22 @ 08:32)  Creatinine, Serum: 0.87 mg/dL (06-17-22 @ 07:55)  Creatinine, Serum: 0.92 mg/dL (06-16-22 @ 16:58)      PT/INR - ( 23 Jun 2022 09:38 )   PT: 17.7 sec;   INR: 1.51 ratio                   COVID RISK SCORE  Auto Neutrophil #: 5.47 K/uL (06-19-22 @ 08:30)  Auto Lymphocyte #: 0.98 K/uL (06-19-22 @ 08:30)  Auto Neutrophil #: 4.66 K/uL (06-17-22 @ 07:55)  Auto Lymphocyte #: 1.05 K/uL (06-17-22 @ 07:55)  Auto Neutrophil #: 6.28 K/uL (06-16-22 @ 07:49)  Auto Lymphocyte #: 0.90 K/uL (06-16-22 @ 07:49)  Auto Neutrophil #: 9.72 K/uL (06-14-22 @ 11:03)  Auto Lymphocyte #: 0.80 K/uL (06-14-22 @ 11:03)    Lactate, Blood: 1.3 mmol/L (06-14-22 @ 11:03)    Auto Eosinophil #: 0.38 K/uL (06-19-22 @ 08:30)  Auto Eosinophil #: 0.48 K/uL (06-17-22 @ 07:55)  Auto Eosinophil #: 0.27 K/uL (06-16-22 @ 07:49)  Auto Eosinophil #: 0.11 K/uL (06-14-22 @ 11:03)      Sedimentation Rate, Erythrocyte: 2 mm/hr (06-14-22 @ 11:03)    Procalcitonin, Serum: 0.11 ng/mL (06-14-22 @ 11:03)            Ferritin, Serum: 113 ng/mL (06-15-22 @ 10:55)        INR: 1.51 ratio (06-23-22 @ 09:38)  INR: 1.45 ratio (06-22-22 @ 07:10)  INR: 1.51 ratio (06-21-22 @ 06:55)  INR: 1.45 ratio (06-20-22 @ 07:53)  INR: 1.45 ratio (06-19-22 @ 08:30)  INR: 1.29 ratio (06-18-22 @ 08:32)  INR: 1.32 ratio (06-17-22 @ 07:55)  INR: 1.36 ratio (06-16-22 @ 16:58)  INR: 1.36 ratio (06-16-22 @ 07:49)  INR: 1.33 ratio (06-15-22 @ 08:04)  INR: 1.26 ratio (06-14-22 @ 16:29)  Activated Partial Thromboplastin Time: 37.0 sec (06-14-22 @ 11:03)  INR: 1.30 ratio (06-14-22 @ 11:03)    D-Dimer Assay, Quantitative: 154 ng/mL DDU (06-15-22 @ 08:04)  D-Dimer Assay, Quantitative: <150 ng/mL DDU (06-14-22 @ 16:29)        MICROBIOLOGY:              SARS-CoV-2: Detected (14 Jun 2022 11:19)  COVID-19 PCR: Detected (14 Jun 2022 11:03)      RADIOLOGY & ADDITIONAL STUDIES:

## 2022-06-23 NOTE — PROGRESS NOTE ADULT - PROBLEM SELECTOR PLAN 11
DVIT -> Eliquis for AFib.

## 2022-06-23 NOTE — PROGRESS NOTE ADULT - PROBLEM SELECTOR PLAN 1
likely 2/2 fever 2/2 COVID - rate-controlled - improved  on CCB,BB - on AC eliquis 2.5mg bid  - c/w Cardizem  qd w/ hold parameters -> a 30 day prescription was written to your local pharm  - c/w Losartan 25 mg 1 tab qd -> a 30 day prescription was written to your local pharm  - DC'ed lopressor for soft BP, rates well controlled  - DC'ed hydralazine given soft bp  - continuous tele monitoring DC'ed  - No meaningful evidence of volume overload.  - Previous TTE shows EF 60-65% on 7/2020  - TTE this admission -> mild L ventricular hypertrophy w/ LVEF 60%  - Continue ASA 81mg and Rosuvastatin 10mg   - Eplerenone on hold  cardio following - Dr Edwards /DR MARTÍNEZ Goldsmith d/w about BP  Meds  -plan to [place] long term care agency  rep Elida placed order to reinstate home health aids for Monday 6/20. Family had difficulty getting private hire. If private hire is found for today patient can be d/aileen, otherwise likely d/c on thursday at 2pm

## 2022-06-23 NOTE — PROGRESS NOTE ADULT - ATTENDING COMMENTS
Pt is a 72 y.o. M with a PMH of HTN, AFIB, BPH, hx of TBI, HLD, CAD, peripheral neuropathy, found to be COVID+ who is being admitted for management of AFib with RVR., COVID 19 + infection.  Pt seen, examined, case & care plan d/w pt, residents at detail.  D/W Wife at  detail, on Phone today.  Cardiology- follow up DR Edwards group- Restart Losartan 25 mg daily  ID DR Manning -Remdesivir 3 dose completed as per ID  Pulmonary-DR Bui -supportive care, stable for d/c  Aspiration precautions.   COVID -Isolation  AM labs   PO diet.   -Pt is stable for D/C Home .  -D/W case management , Pt's HHA starting tomorrow, stable for  D/C   Total  d/c care time is 50 minutes .

## 2022-06-23 NOTE — PROGRESS NOTE ADULT - SUBJECTIVE AND OBJECTIVE BOX
Patient is a 72y old  Male who presents with a chief complaint of covid (22 Jun 2022 13:20)    HPI:  Pt is a 72 y.o. M with a PMH of HTN, AFIB, BPH, hx of TBI, HLD, CAD, peripheral neuropathy, found to be COVID+ who presented to the ED with CC of fatigue. Pt is not the best historian after having TBI 2/2 fall on AC, so history taken via phone from wife. Pt reports that this morning he woke up "feeling lousy" with a fever and a cough. Pt tested COVID + today. Pt restarted eliquis 2 days ago, and follows up with Dr. Goldsmith for cardiology while outpatient. Pt endorses fever, lethargy diminished sensation on hands and feet 2/2 neuropathy, loss of balance, cough, constipation (last BM yesterday, but before that 1 week prior). Denies CP, palpitations, n/v/d, abdominal pain, leg pain. as per wife pt had some friends who visited pt & pt also has 2 HHA .    In the ED:  T 101.2 F Oral  /90 RR 18 SpO2 98% on RA  WBC 11.44 INR 1.3 Glucose 154 Procal 0.11 COVID +  CT Head Non-Con: Mild to moderate chronic microvascular changes. 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   CXR: Lung Clear.  ECG: AFib RVR  2L NS Bolus x1, 1g IV Tylenol, 1g Rocephin (14 Jun 2022 13:13)    INTERVAL HPI:  6/15/22: Patient seen and examined at bedside. No acute events overnight. Patient on BB CCB w/ rate-controlled HR ~80's. On remdesivir for COVID. saturating well on RA. On eliquis for Afib/DVT. Endorses feeling better without significant dyspnea or cough, but has some fatigue/malaise. ON Remdisivir IVPB daily   6/16/22: Patient seen and examined at bedside. No acute events overnight. Rate controlled. HDS. On remdesivir saturating well on RA. Endourses cough but no dyspnea. "I Feel well." S/P IV Remdesivir daily x 3 dose.  6/17/22: Patient seen and examined at bedside. No acute events overnight. s/p remdesivir x3d saturating well on RA. Continued cough no dyspnea. "I feel well." rate controlled increased to cardizem 120 ER qd w/ STAT dose today. dc planning -> per , unable to obtain home health aide today, will attempt over weekend ".i want to go home"  6/18/22 Pt seen and examined at bedside. Pt states he feels "fine". Pt states he still has a dry cough. Also admits to nasal drip. Pt denies fever, chills, SOB, STACK, CP, palpitations. D/C planning.  6/19/22: Patient seen and examined at bedside. No new symptoms at this time feeling "fine." Persistent cough. dc planning today?  6/20/22: Patient seen and examined at bedside. Has no complaints this AM, states he feels well and wants to go home. Admits to intermittent cough. D/C planning home.  6/21/22: Patient seen and examined at bedside. No new complaints. He states that he worked with physical therapy earlier and it "did not go very well". Still admits to intermittent cough, otherwise endorses no new complaints. D/C planning.  6/22/22: patient seen and examined at bedside. No new complaints. He states he's tired but otherwise feels well. Understands that there is a plan in place for him to likely go home tomorrow. Still admits to intermittent cough.  6/23/22: Patient seen and examined at the bedside. No new issues. Stable for D/C home.        OVERNIGHT EVENTS:    Home Medications:  aspirin 81 mg oral tablet: 1 tab(s) orally once a day (14 Jun 2022 13:52)  bethanechol 10 mg oral tablet: 1 tab(s) orally 3 times a day at (8am, 3pm, 10pm) (14 Jun 2022 13:52)  buPROPion 75 mg oral tablet: 1 tab(s) orally 2 times a day (10am, 10pm) (14 Jun 2022 13:52)  Eliquis 2.5 mg oral tablet: 1 tab(s) orally 2 times a day (10am, 10pm)t (14 Jun 2022 13:52)  gabapentin 300 mg oral capsule: 1 cap(s) orally 3 times a day (8am, 3pm, 10pm (14 Jun 2022 13:52)  latanoprost 0.005% ophthalmic solution: 1 drop(s) to each affected eye once a day (in the evening) (14 Jun 2022 13:52)  methylphenidate 10 mg oral tablet: 2 tab(s) orally once a day (in the morning) (14 Jun 2022 13:52)  rosuvastatin 10 mg oral tablet: 1 tab(s) orally once a day (14 Jun 2022 13:52)  tamsulosin 0.4 mg oral capsule: 1 cap(s) orally once a day (in the evening) (14 Jun 2022 13:52)  tiZANidine 4 mg oral tablet: 1 tab(s) orally 3 times a day (14 Jun 2022 13:52)  Vitamin D3 25 mcg (1000 intl units) oral tablet: 1 tab(s) orally once a day (14 Jun 2022 13:52)      MEDICATIONS  (STANDING):  ammonium lactate 12% Lotion 1 Application(s) Topical two times a day  apixaban 2.5 milliGRAM(s) Oral two times a day  artificial  tears Solution 2 Drop(s) Both EYES four times a day  aspirin  chewable 81 milliGRAM(s) Oral daily  atorvastatin 40 milliGRAM(s) Oral at bedtime  benzonatate 100 milliGRAM(s) Oral three times a day  bethanechol 10 milliGRAM(s) Oral three times a day  buPROPion XL (24-Hour) . 150 milliGRAM(s) Oral daily  cholecalciferol 1000 Unit(s) Oral daily  diltiazem    milliGRAM(s) Oral daily  fluticasone propionate 50 MICROgram(s)/spray Nasal Spray 1 Spray(s) Both Nostrils two times a day  gabapentin 300 milliGRAM(s) Oral three times a day  latanoprost 0.005% Ophthalmic Solution 1 Drop(s) Both EYES at bedtime  losartan 25 milliGRAM(s) Oral daily  polyethylene glycol 3350 17 Gram(s) Oral two times a day  senna 2 Tablet(s) Oral at bedtime  tamsulosin 0.4 milliGRAM(s) Oral at bedtime  tiZANidine 4 milliGRAM(s) Oral three times a day    MEDICATIONS  (PRN):  acetaminophen     Tablet .. 650 milliGRAM(s) Oral every 6 hours PRN Temp greater or equal to 38C (100.4F), Mild Pain (1 - 3)  aluminum hydroxide/magnesium hydroxide/simethicone Suspension 30 milliLiter(s) Oral every 4 hours PRN Dyspepsia  guaiFENesin Oral Liquid (Sugar-Free) 200 milliGRAM(s) Oral every 6 hours PRN Cough  melatonin 3 milliGRAM(s) Oral at bedtime PRN Insomnia  ondansetron Injectable 4 milliGRAM(s) IV Push every 8 hours PRN Nausea and/or Vomiting      Allergies    No Known Allergies    Intolerances        Social History:  Vaccinated for COVID 3/3, last shot in february with moderna. Ambulates without assistance- pt is bedbound and wheelchair bound. Pt is reliant on wife for ADLs. Denies alcohol, tobacco, or recreational drug use. (14 Jun 2022 13:13)      REVIEW OF SYSTEMS: I am fine.  CONSTITUTIONAL: No fever, No chills, No fatigue, No myalgia, No Body ache, No Weakness  EYES: No eye pain,  No visual disturbances, No discharge, NO Redness  ENMT:  No ear pain, No nose bleed, No vertigo; No sinus pain, NO throat pain, No Congestion  NECK: No pain, No stiffness  RESPIRATORY: No cough, NO wheezing, No  hemoptysis, NO  shortness of breath  CARDIOVASCULAR: No chest pain, palpitations  GASTROINTESTINAL: No abdominal pain, NO epigastric pain. No nausea, No vomiting; No diarrhea, No constipation. [x  ] BM  GENITOURINARY: No dysuria, No frequency, No urgency, No hematuria, NO incontinence  NEUROLOGICAL: No headaches, No dizziness, No numbness, No tingling, No tremors, No weakness  EXT: No Swelling, No Pain, No Edema  SKIN:  [ x ] No itching, burning, rashes, or lesions   MUSCULOSKELETAL: No joint pain ,No Jt swelling; No muscle pain, No back pain, No extremity pain  PSYCHIATRIC: No depression,  No anxiety,  No mood swings ,No difficulty sleeping at night  PAIN SCALE: [ x ] None  [  ] Other-  ROS Unable to obtain due to - [  ] Dementia  [  ] Lethargy [  ] Drowsy [  ] Sedated [  ] non verbal  REST OF REVIEW Of SYSTEM - [x  ] Normal    Vital Signs Last 24 Hrs  T(C): 36.4 (23 Jun 2022 05:49), Max: 36.4 (23 Jun 2022 05:49)  T(F): 97.6 (23 Jun 2022 05:49), Max: 97.6 (23 Jun 2022 05:49)  HR: 85 (23 Jun 2022 05:49) (85 - 86)  BP: 128/85 (23 Jun 2022 05:49) (128/75 - 128/85)  BP(mean): --  RR: 17 (23 Jun 2022 05:49) (17 - 18)  SpO2: 94% (23 Jun 2022 05:49) (94% - 96%)  Finger Stick        06-22 @ 07:01  -  06-23 @ 07:00  --------------------------------------------------------  IN: 0 mL / OUT: 802 mL / NET: -802 mL        PHYSICAL EXAM:  GENERAL:  [ x ] NAD, [  x] Well appearing, lying back in bed watching TV [  ] Agitated, [  ] Drowsy, [  ] Lethargy, [  ] Confused   HEAD:  [ x ] Normal, [  ] Other  EYES:  [ x ] EOMI, [ x ] PERRLA, [x  ] Conjunctiva and sclera clear normal, [  ] Other, [  ] Pallor, [  ] Discharge  ENMT:  [ x ] Normal, [ x ] Moist mucous membranes, [ x ] Good dentition, [ x ] No thrush  NECK:  [ x ] Supple, [ x ] No JVD, [ x ] Normal thyroid, [  ] Lymphadenopathy, [  ] Other  CHEST/LUNG:  [ x ] Clear to auscultation bilaterally, [ x ] Breath Sounds equal B/L  [  ] Poor effort, [x  ] No rales, [  x] mild rhonchi on right base [ x ] mild expiratory wheeze on right base   HEART:  [ x ] Regular rate, [  ] Tachycardia, [  ] Bradycardia, [ x ] Irregular, [x  ] No murmurs, No rubs, No gallops, [  ] PPM in place (Mfr:  )  ABDOMEN:  [ x ] Soft, [x  ] Nontender, [ x ] Nondistended, [ x ] No mass, [ x ] Bowel sounds present, [  ] Obese  NERVOUS SYSTEM:  [ x ] Alert & Oriented x3, [ x ] Nonfocal, [  ] Confusion, [  ] Encephalopathic, [  ] Sedated, [  ] Unable to assess, [  ] Dementia, [ x ] Other-contractures EXT & Hand B/L   EXTREMITIES:  [ x ] 2+ Peripheral Pulses, No clubbing, No cyanosis,  [  ] Edema B/L lower EXT, [  ] PVD stasis skin changes B/L lower EXT, [  ] Wound [ x ] spasticity of all 4 extremities with prominent contractures of extensors of hands b/l  LYMPH:  No lymphadenopathy noted  SKIN:  [ x ] No rashes or lesions, [  ] Pressure ulcers, [  ] Ecchymosis, [  ] Skin tears, [  ] Other    DIET: Diet, Regular (06-14-22 @ 14:17)      LABS:          PT/INR - ( 23 Jun 2022 09:38 )   PT: 17.7 sec;   INR: 1.51 ratio               Culture Results:   10,000 - 49,000 CFU/mL Pseudomonas aeruginosa  <10,000 CFU/ml Normal Urogenital carmen present (06-17 @ 12:29)                  Culture - Urine (collected 17 Jun 2022 12:29)  Source: Clean Catch Clean Catch (Midstream)  Final Report (19 Jun 2022 12:06):    10,000 - 49,000 CFU/mL Pseudomonas aeruginosa    <10,000 CFU/ml Normal Urogenital carmen present  Organism: Pseudomonas aeruginosa (19 Jun 2022 12:06)  Organism: Pseudomonas aeruginosa (19 Jun 2022 12:06)         Anemia Panel:      Thyroid Panel:                RADIOLOGY & ADDITIONAL TESTS:      HEALTH ISSUES - PROBLEM Dx:  Atrial fibrillation    2019 novel coronavirus disease (COVID-19)    Asymptomatic bacteriuria    TBI (traumatic brain injury)    HTN (hypertension)    HLD (hyperlipidemia)    CAD (coronary artery disease)    Constipation    BPH (benign prostatic hyperplasia)    Major depression    Need for prophylactic measure            Consultant(s) Notes Reviewed:  [  ] YES     Care Discussed with [X] Consultants  [  ] Patient  [  ] Family [  ] HCP [  ]   [  ] Social Service  [  ] RN, [  ] Physical Therapy,[  ] Palliative care team  DVT PPX: [  ] Lovenox, [  ] S C Heparin, [  ] Coumadin, [  ] Xarelto, [  ] Eliquis, [  ] Pradaxa, [  ] IV Heparin drip, [  ] SCD [  ] Contraindication 2 to GI Bleed,[  ] Ambulation [  ] Contraindicated 2 to  bleed [  ] Contraindicated 2 to Brain Bleed  Advanced directive: [  ] None, [  ] DNR/DNI Patient is a 72y old  Male who presents with a chief complaint of covid (22 Jun 2022 13:20)    HPI:  Pt is a 72 y.o. M with a PMH of HTN, AFIB, BPH, hx of TBI, HLD, CAD, peripheral neuropathy, found to be COVID+ who presented to the ED with CC of fatigue. Pt is not the best historian after having TBI 2/2 fall on AC, so history taken via phone from wife. Pt reports that this morning he woke up "feeling lousy" with a fever and a cough. Pt tested COVID + today. Pt restarted eliquis 2 days ago, and follows up with Dr. Goldsmith for cardiology while outpatient. Pt endorses fever, lethargy diminished sensation on hands and feet 2/2 neuropathy, loss of balance, cough, constipation (last BM yesterday, but before that 1 week prior). Denies CP, palpitations, n/v/d, abdominal pain, leg pain. as per wife pt had some friends who visited pt & pt also has 2 HHA .    In the ED:  T 101.2 F Oral  /90 RR 18 SpO2 98% on RA  WBC 11.44 INR 1.3 Glucose 154 Procal 0.11 COVID +  CT Head Non-Con: Mild to moderate chronic microvascular changes. 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   CXR: Lung Clear.  ECG: AFib RVR  2L NS Bolus x1, 1g IV Tylenol, 1g Rocephin (14 Jun 2022 13:13)    INTERVAL HPI:  6/15/22: Patient seen and examined at bedside. No acute events overnight. Patient on BB CCB w/ rate-controlled HR ~80's. On remdesivir for COVID. saturating well on RA. On eliquis for Afib/DVT. Endorses feeling better without significant dyspnea or cough, but has some fatigue/malaise. ON Remdisivir IVPB daily   6/16/22: Patient seen and examined at bedside. No acute events overnight. Rate controlled. HDS. On remdesivir saturating well on RA. Endourses cough but no dyspnea. "I Feel well." S/P IV Remdesivir daily x 3 dose.  6/17/22: Patient seen and examined at bedside. No acute events overnight. s/p remdesivir x3d saturating well on RA. Continued cough no dyspnea. "I feel well." rate controlled increased to cardizem 120 ER qd w/ STAT dose today. dc planning -> per , unable to obtain home health aide today, will attempt over weekend ".i want to go home"  6/18/22 Pt seen and examined at bedside. Pt states he feels "fine". Pt states he still has a dry cough. Also admits to nasal drip. Pt denies fever, chills, SOB, STACK, CP, palpitations. D/C planning.  6/19/22: Patient seen and examined at bedside. No new symptoms at this time feeling "fine." Persistent cough. dc planning today?  6/20/22: Patient seen and examined at bedside. Has no complaints this AM, states he feels well and wants to go home. Admits to intermittent cough. D/C planning home.  6/21/22: Patient seen and examined at bedside. No new complaints. He states that he worked with physical therapy earlier and it "did not go very well". Still admits to intermittent cough, otherwise endorses no new complaints. D/C planning.  6/22/22: patient seen and examined at bedside. No new complaints. He states he's tired but otherwise feels well. Understands that there is a plan in place for him to likely go home tomorrow. Still admits to intermittent cough.  6/23/22: Patient seen and examined at the bedside. No new issues. Stable for D/C home.        OVERNIGHT EVENTS: none    Home Medications:  aspirin 81 mg oral tablet: 1 tab(s) orally once a day (14 Jun 2022 13:52)  bethanechol 10 mg oral tablet: 1 tab(s) orally 3 times a day at (8am, 3pm, 10pm) (14 Jun 2022 13:52)  buPROPion 75 mg oral tablet: 1 tab(s) orally 2 times a day (10am, 10pm) (14 Jun 2022 13:52)  Eliquis 2.5 mg oral tablet: 1 tab(s) orally 2 times a day (10am, 10pm)t (14 Jun 2022 13:52)  gabapentin 300 mg oral capsule: 1 cap(s) orally 3 times a day (8am, 3pm, 10pm (14 Jun 2022 13:52)  latanoprost 0.005% ophthalmic solution: 1 drop(s) to each affected eye once a day (in the evening) (14 Jun 2022 13:52)  methylphenidate 10 mg oral tablet: 2 tab(s) orally once a day (in the morning) (14 Jun 2022 13:52)  rosuvastatin 10 mg oral tablet: 1 tab(s) orally once a day (14 Jun 2022 13:52)  tamsulosin 0.4 mg oral capsule: 1 cap(s) orally once a day (in the evening) (14 Jun 2022 13:52)  tiZANidine 4 mg oral tablet: 1 tab(s) orally 3 times a day (14 Jun 2022 13:52)  Vitamin D3 25 mcg (1000 intl units) oral tablet: 1 tab(s) orally once a day (14 Jun 2022 13:52)      MEDICATIONS  (STANDING):  ammonium lactate 12% Lotion 1 Application(s) Topical two times a day  apixaban 2.5 milliGRAM(s) Oral two times a day  artificial  tears Solution 2 Drop(s) Both EYES four times a day  aspirin  chewable 81 milliGRAM(s) Oral daily  atorvastatin 40 milliGRAM(s) Oral at bedtime  benzonatate 100 milliGRAM(s) Oral three times a day  bethanechol 10 milliGRAM(s) Oral three times a day  buPROPion XL (24-Hour) . 150 milliGRAM(s) Oral daily  cholecalciferol 1000 Unit(s) Oral daily  diltiazem    milliGRAM(s) Oral daily  fluticasone propionate 50 MICROgram(s)/spray Nasal Spray 1 Spray(s) Both Nostrils two times a day  gabapentin 300 milliGRAM(s) Oral three times a day  latanoprost 0.005% Ophthalmic Solution 1 Drop(s) Both EYES at bedtime  losartan 25 milliGRAM(s) Oral daily  polyethylene glycol 3350 17 Gram(s) Oral two times a day  senna 2 Tablet(s) Oral at bedtime  tamsulosin 0.4 milliGRAM(s) Oral at bedtime  tiZANidine 4 milliGRAM(s) Oral three times a day    MEDICATIONS  (PRN):  acetaminophen     Tablet .. 650 milliGRAM(s) Oral every 6 hours PRN Temp greater or equal to 38C (100.4F), Mild Pain (1 - 3)  aluminum hydroxide/magnesium hydroxide/simethicone Suspension 30 milliLiter(s) Oral every 4 hours PRN Dyspepsia  guaiFENesin Oral Liquid (Sugar-Free) 200 milliGRAM(s) Oral every 6 hours PRN Cough  melatonin 3 milliGRAM(s) Oral at bedtime PRN Insomnia  ondansetron Injectable 4 milliGRAM(s) IV Push every 8 hours PRN Nausea and/or Vomiting      Allergies    No Known Allergies    Intolerances        Social History:  Vaccinated for COVID 3/3, last shot in february with moderna. Ambulates without assistance- pt is bedbound and wheelchair bound. Pt is reliant on wife for ADLs. Denies alcohol, tobacco, or recreational drug use. (14 Jun 2022 13:13)      REVIEW OF SYSTEMS: I am fine.  CONSTITUTIONAL: No fever, No chills, No fatigue, No myalgia, No Body ache, No Weakness  EYES: No eye pain,  No visual disturbances, No discharge, NO Redness  ENMT:  No ear pain, No nose bleed, No vertigo; No sinus pain, NO throat pain, No Congestion  NECK: No pain, No stiffness  RESPIRATORY: No cough, NO wheezing, No  hemoptysis, NO  shortness of breath  CARDIOVASCULAR: No chest pain, palpitations  GASTROINTESTINAL: No abdominal pain, NO epigastric pain. No nausea, No vomiting; No diarrhea, No constipation. [x  ] BM  GENITOURINARY: No dysuria, No frequency, No urgency, No hematuria, NO incontinence  NEUROLOGICAL: No headaches, No dizziness, No numbness, No tingling, No tremors, No weakness  EXT: No Swelling, No Pain, No Edema  SKIN:  [ x ] No itching, burning, rashes, or lesions   MUSCULOSKELETAL: No joint pain ,No Jt swelling; No muscle pain, No back pain, No extremity pain  PSYCHIATRIC: No depression,  No anxiety,  No mood swings ,No difficulty sleeping at night  PAIN SCALE: [ x ] None  [  ] Other-  ROS Unable to obtain due to - [  ] Dementia  [  ] Lethargy [  ] Drowsy [  ] Sedated [  ] non verbal  REST OF REVIEW Of SYSTEM - [x  ] Normal    Vital Signs Last 24 Hrs  T(C): 36.4 (23 Jun 2022 05:49), Max: 36.4 (23 Jun 2022 05:49)  T(F): 97.6 (23 Jun 2022 05:49), Max: 97.6 (23 Jun 2022 05:49)  HR: 85 (23 Jun 2022 05:49) (85 - 86)  BP: 128/85 (23 Jun 2022 05:49) (128/75 - 128/85)  BP(mean): --  RR: 17 (23 Jun 2022 05:49) (17 - 18)  SpO2: 94% (23 Jun 2022 05:49) (94% - 96%)  Finger Stick        06-22 @ 07:01  -  06-23 @ 07:00  --------------------------------------------------------  IN: 0 mL / OUT: 802 mL / NET: -802 mL        PHYSICAL EXAM:  GENERAL:  [ x ] NAD, [  x] Well appearing, lying back in bed watching TV [  ] Agitated, [  ] Drowsy, [  ] Lethargy, [  ] Confused   HEAD:  [ x ] Normal, [  ] Other  EYES:  [ x ] EOMI, [ x ] PERRLA, [x  ] Conjunctiva and sclera clear normal, [  ] Other, [  ] Pallor, [  ] Discharge  ENMT:  [ x ] Normal, [ x ] Moist mucous membranes, [ x ] Good dentition, [ x ] No thrush  NECK:  [ x ] Supple, [ x ] No JVD, [ x ] Normal thyroid, [  ] Lymphadenopathy, [  ] Other  CHEST/LUNG:  [ x ] Clear to auscultation bilaterally, [ x ] Breath Sounds equal B/L  [  ] Poor effort, [x  ] No rales, [  x] mild rhonchi on right base [ x ] mild expiratory wheeze on right base   HEART:  [ x ] Regular rate, [  ] Tachycardia, [  ] Bradycardia, [ x ] Irregular, [x  ] No murmurs, No rubs, No gallops, [  ] PPM in place (Mfr:  )  ABDOMEN:  [ x ] Soft, [x  ] Nontender, [ x ] Nondistended, [ x ] No mass, [ x ] Bowel sounds present, [  ] Obese  NERVOUS SYSTEM:  [ x ] Alert & Oriented x3, [ x ] Nonfocal, [  ] Confusion, [  ] Encephalopathic, [  ] Sedated, [  ] Unable to assess, [  ] Dementia, [ x ] Other-contractures EXT & Hand B/L   EXTREMITIES:  [ x ] 2+ Peripheral Pulses, No clubbing, No cyanosis,  [  ] Edema B/L lower EXT, [ x ] PVD stasis skin changes B/L lower EXT, [  ] Wound [ x ] spasticity of all 4 extremities with prominent contractures of extensors of hands b/l  LYMPH:  No lymphadenopathy noted  SKIN:  [ x ] No rashes or lesions, [  ] Pressure ulcers, [  ] Ecchymosis, [  ] Skin tears, [  ] Other    DIET: Diet, Regular (06-14-22 @ 14:17)      LABS:          PT/INR - ( 23 Jun 2022 09:38 )   PT: 17.7 sec;   INR: 1.51 ratio               Culture Results:   10,000 - 49,000 CFU/mL Pseudomonas aeruginosa  <10,000 CFU/ml Normal Urogenital carmen present (06-17 @ 12:29)      Culture - Urine (collected 17 Jun 2022 12:29)  Source: Clean Catch Clean Catch (Midstream)  Final Report (19 Jun 2022 12:06):    10,000 - 49,000 CFU/mL Pseudomonas aeruginosa    <10,000 CFU/ml Normal Urogenital carmen present  Organism: Pseudomonas aeruginosa (19 Jun 2022 12:06)  Organism: Pseudomonas aeruginosa (19 Jun 2022 12:06)         RADIOLOGY & ADDITIONAL TESTS:none      HEALTH ISSUES - PROBLEM Dx:  Atrial fibrillation    2019 novel coronavirus disease (COVID-19)    Asymptomatic bacteriuria    TBI (traumatic brain injury)    HTN (hypertension)    HLD (hyperlipidemia)    CAD (coronary artery disease)    Constipation    BPH (benign prostatic hyperplasia)    Major depression    Need for prophylactic measure            Consultant(s) Notes Reviewed:  [ x ] YES     Care Discussed with [X] Consultants  [x  ] Patient  [x  ] Family- wife  [  ] HCP [x  ]   [  ] Social Service  [ x ] RN, [  ] Physical Therapy,[  ] Palliative care team  DVT PPX: [ x ] Lovenox, [  ] S C Heparin, [  ] Coumadin, [  ] Xarelto, [  ] Eliquis, [  ] Pradaxa, [  ] IV Heparin drip, [  ] SCD [  ] Contraindication 2 to GI Bleed,[  ] Ambulation [  ] Contraindicated 2 to  bleed [  ] Contraindicated 2 to Brain Bleed  Advanced directive: [x  ] None, [  ] DNR/DNI

## 2022-06-23 NOTE — PROGRESS NOTE ADULT - SUBJECTIVE AND OBJECTIVE BOX
[facility] -- Janak Edwards, Alexey العراقي, Agus Oglesby [place], [place]  Office # [phone]    Follow Up:  Afib with RVR      Subjective/Observations: Remains lethargic but verbally responsive.  Remains comfortable on RA.  Denies any form of respiratory or cardiac discomfort    REVIEW OF SYSTEMS: All other review of systems is negative unless indicated above  PAST MEDICAL & SURGICAL HISTORY:  TBI (traumatic brain injury)      HTN (hypertension)      Atrial fibrillation      Peripheral neuropathy      Major depression      HLD (hyperlipidemia)      CAD (coronary artery disease)      BPH (benign prostatic hyperplasia)      No significant past surgical history    MEDICATIONS  (STANDING):  ammonium lactate 12% Lotion 1 Application(s) Topical two times a day  apixaban 2.5 milliGRAM(s) Oral two times a day  artificial  tears Solution 2 Drop(s) Both EYES four times a day  aspirin  chewable 81 milliGRAM(s) Oral daily  atorvastatin 40 milliGRAM(s) Oral at bedtime  benzonatate 100 milliGRAM(s) Oral three times a day  bethanechol 10 milliGRAM(s) Oral three times a day  buPROPion XL (24-Hour) . 150 milliGRAM(s) Oral daily  cholecalciferol 1000 Unit(s) Oral daily  diltiazem    milliGRAM(s) Oral daily  fluticasone propionate 50 MICROgram(s)/spray Nasal Spray 1 Spray(s) Both Nostrils two times a day  gabapentin 300 milliGRAM(s) Oral three times a day  latanoprost 0.005% Ophthalmic Solution 1 Drop(s) Both EYES at bedtime  losartan 25 milliGRAM(s) Oral daily  polyethylene glycol 3350 17 Gram(s) Oral two times a day  senna 2 Tablet(s) Oral at bedtime  tamsulosin 0.4 milliGRAM(s) Oral at bedtime  tiZANidine 4 milliGRAM(s) Oral three times a day    MEDICATIONS  (PRN):  acetaminophen     Tablet .. 650 milliGRAM(s) Oral every 6 hours PRN Temp greater or equal to 38C (100.4F), Mild Pain (1 - 3)  aluminum hydroxide/magnesium hydroxide/simethicone Suspension 30 milliLiter(s) Oral every 4 hours PRN Dyspepsia  guaiFENesin Oral Liquid (Sugar-Free) 200 milliGRAM(s) Oral every 6 hours PRN Cough  melatonin 3 milliGRAM(s) Oral at bedtime PRN Insomnia  ondansetron Injectable 4 milliGRAM(s) IV Push every 8 hours PRN Nausea and/or Vomiting    Allergies    No Known Allergies    Intolerances    Vital Signs Last 24 Hrs  T(C): 36.4 (23 Jun 2022 05:49), Max: 36.4 (23 Jun 2022 05:49)  T(F): 97.6 (23 Jun 2022 05:49), Max: 97.6 (23 Jun 2022 05:49)  HR: 85 (23 Jun 2022 05:49) (85 - 86)  BP: 128/85 (23 Jun 2022 05:49) (128/75 - 128/85)  BP(mean): --  RR: 17 (23 Jun 2022 05:49) (17 - 18)  SpO2: 94% (23 Jun 2022 05:49) (94% - 96%)  I&O's Summary    22 Jun 2022 07:01  -  23 Jun 2022 07:00  --------------------------------------------------------  IN: 0 mL / OUT: 802 mL / NET: -802 mL    PHYSICAL EXAM:  TELE: Not on tele  Constitutional: NAD, awake but lethargic, well-developed  HEENT: Moist Mucous Membranes, Anicteric  Pulmonary: Non-labored, breath sounds are clear but diminished bilaterally, No wheezing, rales or rhonchi  Cardiovascular: IRRR, S1 and S2, No murmurs, rubs, gallops or clicks  Gastrointestinal: Bowel Sounds present, soft, nontender.   Lymph: No peripheral edema. No lymphadenopathy.  Skin: No visible rashes or ulcers.  Psych:  Mood & affect: Flat    LABS: All Labs Reviewed:                        12.3   13.65 )-----------( 217      ( 22 Jun 2022 09:04 )             38.5                         12.5   11.23 )-----------( 207      ( 21 Jun 2022 06:55 )             37.3     22 Jun 2022 07:10    x      |  x      |  x      ----------------------------<  x      x       |  x      |  1.10   21 Jun 2022 06:55    142    |  107    |  12     ----------------------------<  142    3.7     |  28     |  1.00     Ca    8.5        21 Jun 2022 06:55    TPro  5.8    /  Alb  2.8    /  TBili  0.4    /  DBili  0.1    /  AST  5      /  ALT  17     /  AlkPhos  108    22 Jun 2022 07:10  TPro  5.6    /  Alb  2.8    /  TBili  0.5    /  DBili  0.1    /  AST  11     /  ALT  17     /  AlkPhos  100    21 Jun 2022 06:55    PT/INR - ( 23 Jun 2022 09:38 )   PT: 17.7 sec;   INR: 1.51 ratio       ACC: [id number] EXAM:  ECHO TTE WO CON COMP W DOPP                          PROCEDURE DATE:  06/15/2022          INTERPRETATION:  INDICATION: Abnormal EKG  Sonographer KL    Blood Pressure 104/63    Height 165.1 cm     Weight 65.8 kg       BSA   1.73sq m    Dimensions:  LA 4.1       Normal Values: 2.0 - 4.0 cm  Ao 3.2        Normal Values: 2.0 - 3.8 cm  SEPTUM 1.3       Normal Values: 0.6 - 1.2 cm  PWT 1.2       Normal Values: 0.6 - 1.1 cm  LVIDd 4.3         Normal Values: 3.0 - 5.6 cm  LVIDs 2.7         Normal Values: 1.8 - 4.0 cm      OBSERVATIONS:  Mitral Valve: Trace to mild MR.  Aortic Valve/Aorta: normal trileaflet aortic valve.  Tricuspid Valve: normal with trace TR.  Pulmonic Valve: Trace PI  Left Atrium: Enlarged  Right Atrium: Not well-visualized  Left Ventricle: Mild left ventricular hypertrophy with normal systolic   function, estimated LVEF of 60%.  Right Ventricle: Grossly normal size and systolic function.  Pericardium: no significant pericardial effusion.  Pulmonary/RV Pressure: estimated PA systolic pressure of 18 mmHg    IMPRESSION:  Mild left ventricular hypertrophy with normal systolic function,   estimated LVEF of 60%.  Grossly normal RV size and systolic function.  Left atrial enlargement  Normal trileaflet aortic valve, without AI.  Trace to mild MR  Trace TR.  No significant pericardial effusion.    --- End of Report ---    KLAUS MUNIZ MD; Attending Cardiologist  This document has been electronically signed. Jun 16 2022  4:54PM    ACC: [id number] EXAM:  XR CHEST PORTABLE URGENT 1V                          PROCEDURE DATE:  06/14/2022      INTERPRETATION:  AP semierect chest on June 14, 2020 at 11:05 AM. Patient   has sepsis.    Heart magnified by technique.    Lungs remain clear.    Chest is similar to May 22.    IMPRESSION: No acute finding or change.    --- End of Report ---    SUSU TODD MD; Attending Radiologist  This document has been electronically signed. Jun 14 2022 12:34PM    Ventricular Rate 123 BPM    QRS Duration 78 ms    Q-T Interval 298 ms    QTC Calculation(Bazett) 426 ms    R Axis 200 degrees    T Axis 5 degrees    Diagnosis Line Atrial fibrillation with rapid ventricular response  Right superior axis deviation  Right ventricular hypertrophy  Septal infarct (cited on or before 22-MAY-2022)  Abnormal ECG  When compared with ECG of 22-MAY-2022 14:51,  No significant change was found  Confirmed by TOMMY OGLESBY ([id number]) on 6/14/2022 12:39:22 PM

## 2022-06-24 ENCOUNTER — TRANSCRIPTION ENCOUNTER (OUTPATIENT)
Age: 72
End: 2022-06-24

## 2022-06-24 ENCOUNTER — NON-APPOINTMENT (OUTPATIENT)
Age: 72
End: 2022-06-24

## 2022-06-24 PROBLEM — I25.10 ATHEROSCLEROTIC HEART DISEASE OF NATIVE CORONARY ARTERY WITHOUT ANGINA PECTORIS: Chronic | Status: ACTIVE | Noted: 2022-06-14

## 2022-06-24 PROBLEM — G62.9 POLYNEUROPATHY, UNSPECIFIED: Chronic | Status: ACTIVE | Noted: 2022-06-14

## 2022-06-24 PROBLEM — I10 ESSENTIAL (PRIMARY) HYPERTENSION: Chronic | Status: ACTIVE | Noted: 2022-06-14

## 2022-06-24 PROBLEM — N40.0 BENIGN PROSTATIC HYPERPLASIA WITHOUT LOWER URINARY TRACT SYMPTOMS: Chronic | Status: ACTIVE | Noted: 2022-06-14

## 2022-06-24 PROBLEM — I48.91 UNSPECIFIED ATRIAL FIBRILLATION: Chronic | Status: ACTIVE | Noted: 2022-06-14

## 2022-06-24 PROBLEM — F32.9 MAJOR DEPRESSIVE DISORDER, SINGLE EPISODE, UNSPECIFIED: Chronic | Status: ACTIVE | Noted: 2022-06-14

## 2022-06-24 PROBLEM — E78.5 HYPERLIPIDEMIA, UNSPECIFIED: Chronic | Status: ACTIVE | Noted: 2022-06-14

## 2022-06-24 RX ORDER — DILTIAZEM HYDROCHLORIDE 300 MG/1
300 CAPSULE, EXTENDED RELEASE ORAL
Qty: 90 | Refills: 3 | Status: COMPLETED | COMMUNITY
Start: 2022-03-24 | End: 2022-06-24

## 2022-06-24 RX ORDER — LOSARTAN POTASSIUM 100 MG/1
100 TABLET, FILM COATED ORAL
Qty: 90 | Refills: 3 | Status: COMPLETED | COMMUNITY
Start: 2022-04-18 | End: 2022-06-24

## 2022-06-29 ENCOUNTER — TRANSCRIPTION ENCOUNTER (OUTPATIENT)
Age: 72
End: 2022-06-29

## 2022-06-30 ENCOUNTER — INPATIENT (INPATIENT)
Facility: HOSPITAL | Age: 72
LOS: 5 days | Discharge: HOME | DRG: 871 | End: 2022-07-06
Attending: INTERNAL MEDICINE | Admitting: INTERNAL MEDICINE
Payer: COMMERCIAL

## 2022-06-30 VITALS
SYSTOLIC BLOOD PRESSURE: 144 MMHG | RESPIRATION RATE: 16 BRPM | OXYGEN SATURATION: 95 % | DIASTOLIC BLOOD PRESSURE: 93 MMHG | HEART RATE: 120 BPM | TEMPERATURE: 98 F | HEIGHT: 65 IN

## 2022-06-30 LAB
ALBUMIN SERPL ELPH-MCNC: 3.6 G/DL — SIGNIFICANT CHANGE UP (ref 3.3–5)
ALP SERPL-CCNC: 108 U/L — SIGNIFICANT CHANGE UP (ref 40–120)
ALT FLD-CCNC: 19 U/L — SIGNIFICANT CHANGE UP (ref 12–78)
ANION GAP SERPL CALC-SCNC: 7 MMOL/L — SIGNIFICANT CHANGE UP (ref 5–17)
APTT BLD: 34.4 SEC — SIGNIFICANT CHANGE UP (ref 27.5–35.5)
AST SERPL-CCNC: 12 U/L — LOW (ref 15–37)
BILIRUB SERPL-MCNC: 0.8 MG/DL — SIGNIFICANT CHANGE UP (ref 0.2–1.2)
BUN SERPL-MCNC: 21 MG/DL — SIGNIFICANT CHANGE UP (ref 7–23)
CALCIUM SERPL-MCNC: 9 MG/DL — SIGNIFICANT CHANGE UP (ref 8.5–10.1)
CHLORIDE SERPL-SCNC: 112 MMOL/L — HIGH (ref 96–108)
CO2 SERPL-SCNC: 27 MMOL/L — SIGNIFICANT CHANGE UP (ref 22–31)
CREAT SERPL-MCNC: 1.3 MG/DL — SIGNIFICANT CHANGE UP (ref 0.5–1.3)
EGFR: 59 ML/MIN/1.73M2 — LOW
GLUCOSE SERPL-MCNC: 162 MG/DL — HIGH (ref 70–99)
HCT VFR BLD CALC: 47.3 % — SIGNIFICANT CHANGE UP (ref 39–50)
HGB BLD-MCNC: 15.2 G/DL — SIGNIFICANT CHANGE UP (ref 13–17)
INR BLD: 1.37 RATIO — HIGH (ref 0.88–1.16)
LACTATE SERPL-SCNC: 2.6 MMOL/L — HIGH (ref 0.7–2)
MCHC RBC-ENTMCNC: 25.9 PG — LOW (ref 27–34)
MCHC RBC-ENTMCNC: 32.1 GM/DL — SIGNIFICANT CHANGE UP (ref 32–36)
MCV RBC AUTO: 80.4 FL — SIGNIFICANT CHANGE UP (ref 80–100)
PLATELET # BLD AUTO: 227 K/UL — SIGNIFICANT CHANGE UP (ref 150–400)
POTASSIUM SERPL-MCNC: 4.2 MMOL/L — SIGNIFICANT CHANGE UP (ref 3.5–5.3)
POTASSIUM SERPL-SCNC: 4.2 MMOL/L — SIGNIFICANT CHANGE UP (ref 3.5–5.3)
PROT SERPL-MCNC: 6.9 G/DL — SIGNIFICANT CHANGE UP (ref 6–8.3)
PROTHROM AB SERPL-ACNC: 16.1 SEC — HIGH (ref 10.5–13.4)
RBC # BLD: 5.88 M/UL — HIGH (ref 4.2–5.8)
RBC # FLD: 15.3 % — HIGH (ref 10.3–14.5)
SODIUM SERPL-SCNC: 146 MMOL/L — HIGH (ref 135–145)
WBC # BLD: 27.28 K/UL — HIGH (ref 3.8–10.5)
WBC # FLD AUTO: 27.28 K/UL — HIGH (ref 3.8–10.5)

## 2022-06-30 PROCEDURE — G1004: CPT

## 2022-06-30 PROCEDURE — 74176 CT ABD & PELVIS W/O CONTRAST: CPT | Mod: 26,ME

## 2022-06-30 PROCEDURE — 99285 EMERGENCY DEPT VISIT HI MDM: CPT

## 2022-06-30 PROCEDURE — 71045 X-RAY EXAM CHEST 1 VIEW: CPT | Mod: 26

## 2022-06-30 PROCEDURE — 93010 ELECTROCARDIOGRAM REPORT: CPT

## 2022-06-30 PROCEDURE — 71250 CT THORAX DX C-: CPT | Mod: 26,MG

## 2022-06-30 RX ORDER — SODIUM CHLORIDE 9 MG/ML
1000 INJECTION INTRAMUSCULAR; INTRAVENOUS; SUBCUTANEOUS ONCE
Refills: 0 | Status: COMPLETED | OUTPATIENT
Start: 2022-06-30 | End: 2022-06-30

## 2022-06-30 RX ORDER — PIPERACILLIN AND TAZOBACTAM 4; .5 G/20ML; G/20ML
3.38 INJECTION, POWDER, LYOPHILIZED, FOR SOLUTION INTRAVENOUS ONCE
Refills: 0 | Status: COMPLETED | OUTPATIENT
Start: 2022-06-30 | End: 2022-06-30

## 2022-06-30 RX ORDER — ACETAMINOPHEN 500 MG
650 TABLET ORAL ONCE
Refills: 0 | Status: COMPLETED | OUTPATIENT
Start: 2022-06-30 | End: 2022-06-30

## 2022-06-30 RX ORDER — ONDANSETRON 8 MG/1
4 TABLET, FILM COATED ORAL ONCE
Refills: 0 | Status: COMPLETED | OUTPATIENT
Start: 2022-06-30 | End: 2022-06-30

## 2022-06-30 RX ORDER — VANCOMYCIN HCL 1 G
1000 VIAL (EA) INTRAVENOUS ONCE
Refills: 0 | Status: COMPLETED | OUTPATIENT
Start: 2022-06-30 | End: 2022-06-30

## 2022-06-30 RX ADMIN — SODIUM CHLORIDE 1000 MILLILITER(S): 9 INJECTION INTRAMUSCULAR; INTRAVENOUS; SUBCUTANEOUS at 22:37

## 2022-06-30 RX ADMIN — ONDANSETRON 4 MILLIGRAM(S): 8 TABLET, FILM COATED ORAL at 22:36

## 2022-06-30 RX ADMIN — SODIUM CHLORIDE 1000 MILLILITER(S): 9 INJECTION INTRAMUSCULAR; INTRAVENOUS; SUBCUTANEOUS at 23:42

## 2022-06-30 RX ADMIN — Medication 650 MILLIGRAM(S): at 22:36

## 2022-06-30 NOTE — ED PROVIDER NOTE - OBJECTIVE STATEMENT
73yo male bib ems with wife c/o chillls and vomiting tonight. no fever, no cough, pt was just diagnosed and admitted for covid

## 2022-06-30 NOTE — ED ADULT TRIAGE NOTE - CHIEF COMPLAINT QUOTE
per ems from home, vomiting today after eating something, had neurologist appointment today and was told everything was fine

## 2022-07-01 ENCOUNTER — TRANSCRIPTION ENCOUNTER (OUTPATIENT)
Age: 72
End: 2022-07-01

## 2022-07-01 ENCOUNTER — NON-APPOINTMENT (OUTPATIENT)
Age: 72
End: 2022-07-01

## 2022-07-01 DIAGNOSIS — J18.9 PNEUMONIA, UNSPECIFIED ORGANISM: ICD-10-CM

## 2022-07-01 DIAGNOSIS — Z87.820 PERSONAL HISTORY OF TRAUMATIC BRAIN INJURY: ICD-10-CM

## 2022-07-01 DIAGNOSIS — H40.9 UNSPECIFIED GLAUCOMA: ICD-10-CM

## 2022-07-01 DIAGNOSIS — F32.9 MAJOR DEPRESSIVE DISORDER, SINGLE EPISODE, UNSPECIFIED: ICD-10-CM

## 2022-07-01 DIAGNOSIS — I48.91 UNSPECIFIED ATRIAL FIBRILLATION: ICD-10-CM

## 2022-07-01 DIAGNOSIS — I25.10 ATHEROSCLEROTIC HEART DISEASE OF NATIVE CORONARY ARTERY WITHOUT ANGINA PECTORIS: ICD-10-CM

## 2022-07-01 DIAGNOSIS — Z87.438 PERSONAL HISTORY OF OTHER DISEASES OF MALE GENITAL ORGANS: ICD-10-CM

## 2022-07-01 DIAGNOSIS — A41.9 SEPSIS, UNSPECIFIED ORGANISM: ICD-10-CM

## 2022-07-01 DIAGNOSIS — N17.9 ACUTE KIDNEY FAILURE, UNSPECIFIED: ICD-10-CM

## 2022-07-01 DIAGNOSIS — I10 ESSENTIAL (PRIMARY) HYPERTENSION: ICD-10-CM

## 2022-07-01 DIAGNOSIS — Z29.9 ENCOUNTER FOR PROPHYLACTIC MEASURES, UNSPECIFIED: ICD-10-CM

## 2022-07-01 LAB
ALBUMIN SERPL ELPH-MCNC: 2.8 G/DL — LOW (ref 3.3–5)
ALP SERPL-CCNC: 82 U/L — SIGNIFICANT CHANGE UP (ref 40–120)
ALT FLD-CCNC: 15 U/L — SIGNIFICANT CHANGE UP (ref 12–78)
ANION GAP SERPL CALC-SCNC: 6 MMOL/L — SIGNIFICANT CHANGE UP (ref 5–17)
APPEARANCE UR: CLEAR — SIGNIFICANT CHANGE UP
AST SERPL-CCNC: 8 U/L — LOW (ref 15–37)
BACTERIA # UR AUTO: ABNORMAL
BASOPHILS # BLD AUTO: 0.07 K/UL — SIGNIFICANT CHANGE UP (ref 0–0.2)
BASOPHILS # BLD AUTO: 0.1 K/UL — SIGNIFICANT CHANGE UP (ref 0–0.2)
BASOPHILS NFR BLD AUTO: 0.3 % — SIGNIFICANT CHANGE UP (ref 0–2)
BASOPHILS NFR BLD AUTO: 0.4 % — SIGNIFICANT CHANGE UP (ref 0–2)
BILIRUB SERPL-MCNC: 0.8 MG/DL — SIGNIFICANT CHANGE UP (ref 0.2–1.2)
BILIRUB UR-MCNC: NEGATIVE — SIGNIFICANT CHANGE UP
BUN SERPL-MCNC: 19 MG/DL — SIGNIFICANT CHANGE UP (ref 7–23)
CALCIUM SERPL-MCNC: 8.2 MG/DL — LOW (ref 8.5–10.1)
CHLORIDE SERPL-SCNC: 115 MMOL/L — HIGH (ref 96–108)
CO2 SERPL-SCNC: 26 MMOL/L — SIGNIFICANT CHANGE UP (ref 22–31)
COLOR SPEC: YELLOW — SIGNIFICANT CHANGE UP
CREAT SERPL-MCNC: 1.1 MG/DL — SIGNIFICANT CHANGE UP (ref 0.5–1.3)
DIFF PNL FLD: ABNORMAL
EGFR: 72 ML/MIN/1.73M2 — SIGNIFICANT CHANGE UP
EOSINOPHIL # BLD AUTO: 0.01 K/UL — SIGNIFICANT CHANGE UP (ref 0–0.5)
EOSINOPHIL # BLD AUTO: 0.01 K/UL — SIGNIFICANT CHANGE UP (ref 0–0.5)
EOSINOPHIL NFR BLD AUTO: 0 % — SIGNIFICANT CHANGE UP (ref 0–6)
EOSINOPHIL NFR BLD AUTO: 0 % — SIGNIFICANT CHANGE UP (ref 0–6)
EPI CELLS # UR: SIGNIFICANT CHANGE UP
GLUCOSE SERPL-MCNC: 194 MG/DL — HIGH (ref 70–99)
GLUCOSE UR QL: NEGATIVE — SIGNIFICANT CHANGE UP
HCT VFR BLD CALC: 38 % — LOW (ref 39–50)
HGB BLD-MCNC: 12.4 G/DL — LOW (ref 13–17)
IMM GRANULOCYTES NFR BLD AUTO: 0.9 % — SIGNIFICANT CHANGE UP (ref 0–1.5)
IMM GRANULOCYTES NFR BLD AUTO: 0.9 % — SIGNIFICANT CHANGE UP (ref 0–1.5)
KETONES UR-MCNC: NEGATIVE — SIGNIFICANT CHANGE UP
LACTATE SERPL-SCNC: 1.9 MMOL/L — SIGNIFICANT CHANGE UP (ref 0.7–2)
LEUKOCYTE ESTERASE UR-ACNC: ABNORMAL
LYMPHOCYTES # BLD AUTO: 0.46 K/UL — LOW (ref 1–3.3)
LYMPHOCYTES # BLD AUTO: 0.78 K/UL — LOW (ref 1–3.3)
LYMPHOCYTES # BLD AUTO: 1.7 % — LOW (ref 13–44)
LYMPHOCYTES # BLD AUTO: 2.9 % — LOW (ref 13–44)
MANUAL SMEAR VERIFICATION: YES — SIGNIFICANT CHANGE UP
MCHC RBC-ENTMCNC: 26.2 PG — LOW (ref 27–34)
MCHC RBC-ENTMCNC: 32.6 GM/DL — SIGNIFICANT CHANGE UP (ref 32–36)
MCV RBC AUTO: 80.3 FL — SIGNIFICANT CHANGE UP (ref 80–100)
MONOCYTES # BLD AUTO: 0.79 K/UL — SIGNIFICANT CHANGE UP (ref 0–0.9)
MONOCYTES # BLD AUTO: 1.06 K/UL — HIGH (ref 0–0.9)
MONOCYTES NFR BLD AUTO: 2.9 % — SIGNIFICANT CHANGE UP (ref 2–14)
MONOCYTES NFR BLD AUTO: 3.9 % — SIGNIFICANT CHANGE UP (ref 2–14)
MRSA PCR RESULT.: DETECTED
NEUTROPHILS # BLD AUTO: 25.23 K/UL — HIGH (ref 1.8–7.4)
NEUTROPHILS # BLD AUTO: 25.41 K/UL — HIGH (ref 1.8–7.4)
NEUTROPHILS NFR BLD AUTO: 93 % — HIGH (ref 43–77)
NEUTROPHILS NFR BLD AUTO: 93.1 % — HIGH (ref 43–77)
NITRITE UR-MCNC: NEGATIVE — SIGNIFICANT CHANGE UP
NRBC # BLD: 0 /100 WBCS — SIGNIFICANT CHANGE UP (ref 0–0)
NRBC # BLD: 0 /100 WBCS — SIGNIFICANT CHANGE UP (ref 0–0)
PH UR: 5 — SIGNIFICANT CHANGE UP (ref 5–8)
PLAT MORPH BLD: NORMAL — SIGNIFICANT CHANGE UP
PLATELET # BLD AUTO: 210 K/UL — SIGNIFICANT CHANGE UP (ref 150–400)
POTASSIUM SERPL-MCNC: 3.6 MMOL/L — SIGNIFICANT CHANGE UP (ref 3.5–5.3)
POTASSIUM SERPL-SCNC: 3.6 MMOL/L — SIGNIFICANT CHANGE UP (ref 3.5–5.3)
PROCALCITONIN SERPL-MCNC: 4.05 NG/ML — HIGH
PROT SERPL-MCNC: 5.6 G/DL — LOW (ref 6–8.3)
PROT UR-MCNC: 30 MG/DL
RAPID RVP RESULT: DETECTED
RBC # BLD: 4.73 M/UL — SIGNIFICANT CHANGE UP (ref 4.2–5.8)
RBC # FLD: 14.3 % — SIGNIFICANT CHANGE UP (ref 10.3–14.5)
RBC BLD AUTO: SIGNIFICANT CHANGE UP
RBC CASTS # UR COMP ASSIST: SIGNIFICANT CHANGE UP /HPF (ref 0–4)
S AUREUS DNA NOSE QL NAA+PROBE: DETECTED
SARS-COV-2 RNA SPEC QL NAA+PROBE: DETECTED
SODIUM SERPL-SCNC: 147 MMOL/L — HIGH (ref 135–145)
SP GR SPEC: 1.02 — SIGNIFICANT CHANGE UP (ref 1.01–1.02)
UROBILINOGEN FLD QL: NEGATIVE — SIGNIFICANT CHANGE UP
WBC # BLD: 27.12 K/UL — HIGH (ref 3.8–10.5)
WBC # FLD AUTO: 27.12 K/UL — HIGH (ref 3.8–10.5)
WBC UR QL: SIGNIFICANT CHANGE UP

## 2022-07-01 RX ORDER — ACETAMINOPHEN 500 MG
650 TABLET ORAL EVERY 6 HOURS
Refills: 0 | Status: DISCONTINUED | OUTPATIENT
Start: 2022-07-01 | End: 2022-07-06

## 2022-07-01 RX ORDER — VANCOMYCIN HCL 1 G
1000 VIAL (EA) INTRAVENOUS EVERY 12 HOURS
Refills: 0 | Status: DISCONTINUED | OUTPATIENT
Start: 2022-07-02 | End: 2022-07-01

## 2022-07-01 RX ORDER — ATORVASTATIN CALCIUM 80 MG/1
40 TABLET, FILM COATED ORAL AT BEDTIME
Refills: 0 | Status: DISCONTINUED | OUTPATIENT
Start: 2022-07-01 | End: 2022-07-06

## 2022-07-01 RX ORDER — LATANOPROST 0.05 MG/ML
1 SOLUTION/ DROPS OPHTHALMIC; TOPICAL AT BEDTIME
Refills: 0 | Status: DISCONTINUED | OUTPATIENT
Start: 2022-07-01 | End: 2022-07-06

## 2022-07-01 RX ORDER — GABAPENTIN 400 MG/1
300 CAPSULE ORAL THREE TIMES A DAY
Refills: 0 | Status: DISCONTINUED | OUTPATIENT
Start: 2022-07-01 | End: 2022-07-06

## 2022-07-01 RX ORDER — TIZANIDINE 4 MG/1
4 TABLET ORAL THREE TIMES A DAY
Refills: 0 | Status: DISCONTINUED | OUTPATIENT
Start: 2022-07-01 | End: 2022-07-06

## 2022-07-01 RX ORDER — DILTIAZEM HCL 120 MG
120 CAPSULE, EXT RELEASE 24 HR ORAL DAILY
Refills: 0 | Status: DISCONTINUED | OUTPATIENT
Start: 2022-07-01 | End: 2022-07-06

## 2022-07-01 RX ORDER — METHYLPHENIDATE HCL 5 MG
20 TABLET ORAL
Refills: 0 | Status: DISCONTINUED | OUTPATIENT
Start: 2022-07-01 | End: 2022-07-06

## 2022-07-01 RX ORDER — VANCOMYCIN HCL 1 G
1000 VIAL (EA) INTRAVENOUS EVERY 24 HOURS
Refills: 0 | Status: DISCONTINUED | OUTPATIENT
Start: 2022-07-02 | End: 2022-07-06

## 2022-07-01 RX ORDER — BUPROPION HYDROCHLORIDE 150 MG/1
150 TABLET, EXTENDED RELEASE ORAL DAILY
Refills: 0 | Status: DISCONTINUED | OUTPATIENT
Start: 2022-07-01 | End: 2022-07-06

## 2022-07-01 RX ORDER — ASPIRIN/CALCIUM CARB/MAGNESIUM 324 MG
81 TABLET ORAL DAILY
Refills: 0 | Status: DISCONTINUED | OUTPATIENT
Start: 2022-07-01 | End: 2022-07-06

## 2022-07-01 RX ORDER — PIPERACILLIN AND TAZOBACTAM 4; .5 G/20ML; G/20ML
3.38 INJECTION, POWDER, LYOPHILIZED, FOR SOLUTION INTRAVENOUS EVERY 8 HOURS
Refills: 0 | Status: DISCONTINUED | OUTPATIENT
Start: 2022-07-01 | End: 2022-07-01

## 2022-07-01 RX ORDER — MUPIROCIN 20 MG/G
1 OINTMENT TOPICAL
Refills: 0 | Status: COMPLETED | OUTPATIENT
Start: 2022-07-01 | End: 2022-07-06

## 2022-07-01 RX ORDER — TAMSULOSIN HYDROCHLORIDE 0.4 MG/1
0.4 CAPSULE ORAL AT BEDTIME
Refills: 0 | Status: DISCONTINUED | OUTPATIENT
Start: 2022-07-01 | End: 2022-07-06

## 2022-07-01 RX ORDER — APIXABAN 2.5 MG/1
2.5 TABLET, FILM COATED ORAL EVERY 12 HOURS
Refills: 0 | Status: DISCONTINUED | OUTPATIENT
Start: 2022-07-01 | End: 2022-07-06

## 2022-07-01 RX ORDER — CEFEPIME 1 G/1
2000 INJECTION, POWDER, FOR SOLUTION INTRAMUSCULAR; INTRAVENOUS EVERY 12 HOURS
Refills: 0 | Status: DISCONTINUED | OUTPATIENT
Start: 2022-07-01 | End: 2022-07-06

## 2022-07-01 RX ORDER — SODIUM CHLORIDE 9 MG/ML
1000 INJECTION, SOLUTION INTRAVENOUS
Refills: 0 | Status: DISCONTINUED | OUTPATIENT
Start: 2022-07-01 | End: 2022-07-06

## 2022-07-01 RX ORDER — BETHANECHOL CHLORIDE 25 MG
10 TABLET ORAL THREE TIMES A DAY
Refills: 0 | Status: DISCONTINUED | OUTPATIENT
Start: 2022-07-01 | End: 2022-07-06

## 2022-07-01 RX ADMIN — PIPERACILLIN AND TAZOBACTAM 200 GRAM(S): 4; .5 INJECTION, POWDER, LYOPHILIZED, FOR SOLUTION INTRAVENOUS at 01:02

## 2022-07-01 RX ADMIN — Medication 10 MILLIGRAM(S): at 22:54

## 2022-07-01 RX ADMIN — LATANOPROST 1 DROP(S): 0.05 SOLUTION/ DROPS OPHTHALMIC; TOPICAL at 22:52

## 2022-07-01 RX ADMIN — GABAPENTIN 300 MILLIGRAM(S): 400 CAPSULE ORAL at 14:51

## 2022-07-01 RX ADMIN — GABAPENTIN 300 MILLIGRAM(S): 400 CAPSULE ORAL at 06:41

## 2022-07-01 RX ADMIN — Medication 120 MILLIGRAM(S): at 06:40

## 2022-07-01 RX ADMIN — MUPIROCIN 1 APPLICATION(S): 20 OINTMENT TOPICAL at 20:27

## 2022-07-01 RX ADMIN — ATORVASTATIN CALCIUM 40 MILLIGRAM(S): 80 TABLET, FILM COATED ORAL at 22:53

## 2022-07-01 RX ADMIN — PIPERACILLIN AND TAZOBACTAM 25 GRAM(S): 4; .5 INJECTION, POWDER, LYOPHILIZED, FOR SOLUTION INTRAVENOUS at 11:10

## 2022-07-01 RX ADMIN — SODIUM CHLORIDE 50 MILLILITER(S): 9 INJECTION, SOLUTION INTRAVENOUS at 14:51

## 2022-07-01 RX ADMIN — Medication 10 MILLIGRAM(S): at 06:42

## 2022-07-01 RX ADMIN — TIZANIDINE 4 MILLIGRAM(S): 4 TABLET ORAL at 22:52

## 2022-07-01 RX ADMIN — Medication 20 MILLIGRAM(S): at 11:10

## 2022-07-01 RX ADMIN — TIZANIDINE 4 MILLIGRAM(S): 4 TABLET ORAL at 14:51

## 2022-07-01 RX ADMIN — Medication 81 MILLIGRAM(S): at 12:26

## 2022-07-01 RX ADMIN — CEFEPIME 100 MILLIGRAM(S): 1 INJECTION, POWDER, FOR SOLUTION INTRAMUSCULAR; INTRAVENOUS at 22:52

## 2022-07-01 RX ADMIN — PIPERACILLIN AND TAZOBACTAM 25 GRAM(S): 4; .5 INJECTION, POWDER, LYOPHILIZED, FOR SOLUTION INTRAVENOUS at 17:21

## 2022-07-01 RX ADMIN — BUPROPION HYDROCHLORIDE 150 MILLIGRAM(S): 150 TABLET, EXTENDED RELEASE ORAL at 12:26

## 2022-07-01 RX ADMIN — Medication 10 MILLIGRAM(S): at 14:51

## 2022-07-01 RX ADMIN — TAMSULOSIN HYDROCHLORIDE 0.4 MILLIGRAM(S): 0.4 CAPSULE ORAL at 22:53

## 2022-07-01 RX ADMIN — Medication 250 MILLIGRAM(S): at 02:19

## 2022-07-01 RX ADMIN — SODIUM CHLORIDE 1000 MILLILITER(S): 9 INJECTION INTRAMUSCULAR; INTRAVENOUS; SUBCUTANEOUS at 01:05

## 2022-07-01 RX ADMIN — GABAPENTIN 300 MILLIGRAM(S): 400 CAPSULE ORAL at 22:53

## 2022-07-01 RX ADMIN — APIXABAN 2.5 MILLIGRAM(S): 2.5 TABLET, FILM COATED ORAL at 06:40

## 2022-07-01 RX ADMIN — APIXABAN 2.5 MILLIGRAM(S): 2.5 TABLET, FILM COATED ORAL at 17:21

## 2022-07-01 NOTE — SWALLOW BEDSIDE ASSESSMENT ADULT - SLP GENERAL OBSERVATIONS
Pt received upright in bed A&A Ox4, +O2NC, +baseline coughing which continued intermittently throughout eval, pain scale 0/10 pre & post eval

## 2022-07-01 NOTE — H&P ADULT - NSHPSOCIALHISTORY_GEN_ALL_CORE
Lives with wife.  Pt is bedbound and wheelchair bound. Pt is reliant on wife for ADLs.   No h/o alcohol, tobacco, or recreational drug use  COVID vaccinated x3, last in february with Moderna.

## 2022-07-01 NOTE — H&P ADULT - PROBLEM SELECTOR PLAN 7
- Chronic and stable   - Medication adjusted during the last admission   - currently on Losartan 25 qd and Cardizem 120.  Hold for now   - MOnitor BP - Chronic and stable   - Medication adjusted during the last admission   - Continue home Losartan 25 qd and Cardizem 120 with hold parameters.   - Monitor BP - Chronic and stable   - Medication adjusted during the last admission   - Continue home Cardizem 120 with hold parameters.   - Losartan 25 qd  in the setting or LATOYA  - Monitor BP - Chronic and stable   - Medication adjusted during the last admission   - Continue home Cardizem CD  120 with hold parameters.   - Losartan 25 qd  HOLD  in the setting or LATOYA  - Monitor BP

## 2022-07-01 NOTE — CONSULT NOTE ADULT - ASSESSMENT
Pt is a 72 y.o male with a PMH of HTN, AFIB, BPH, bedbound 2/2 TBI, HLD, CAD, peripheral neuropathy, glaucoma, who presented to the ED for sweats and increasing cough.       Sepsis 2/2 post-viral PNA; possible HAP  Recent COVID-19 PNA  - pt recent admitted for COVID, s/p remdesivir  - now p/w worsening cough/diarrhea  - found to have fever/leukocytosis/tachycardia  - CT w/ b/l LL bronchoPNA  - s/p vanc/zosyn in the ED  - cx pending  Plan:   F/u pending cx  F/u uPNA Ag  F/u MRSA swab  Can d/c vanc if MRSA swab negative  C/w zosyn for nosocomial coverage  Supportive care and supplemental O2 as needed  Trend temps/WBC  Maintain aspiration precautions  Maintain isolation precautions    Dr. Perea covering weekend service  Infectious Diseases will continue to follow. Please call with any questions.   Hafsa Bhardwaj M.D.  [facility], Division of Infectious Diseases [phone]   Pt is a 72 y.o male with a PMH of HTN, AFIB, BPH, bedbound 2/2 TBI, HLD, CAD, peripheral neuropathy, glaucoma, who presented to the ED for sweats and increasing cough.       Sepsis 2/2 post-viral PNA; possible HAP  Recent COVID-19 PNA  - pt recent admitted for COVID, s/p remdesivir  - now p/w worsening cough/diarrhea  - found to have fever/leukocytosis/tachycardia  - CT w/ b/l LL bronchoPNA  - s/p vanc/zosyn in the ED  - MRSA swab +  - cx pending  Plan:   F/u pending cx  F/u uPNA Ag  Sputum cx if possible  C/w vancomycin, goal trough 15-20  Switched to cefepime to mitigate LATOYA risk  Supportive care and supplemental O2 as needed  Trend temps/WBC  Maintain aspiration precautions  Maintain isolation precautions    Dr. Perea covering weekend service  Infectious Diseases will continue to follow. Please call with any questions.   Hafsa Bhardwaj M.D.  [facility], Division of Infectious Diseases [phone]

## 2022-07-01 NOTE — H&P ADULT - PROBLEM SELECTOR PLAN 4
- As per wife no acute/new chest pain   - Normal troponin  - EKG: Afib , right axis deviation, RV hypertrophy septal and inferior infarct age undeterm.   - No significant changes seen from previous EKG  - Hold home rosuvastatin, ASA for now (AMS)  - Monitor in tele. - As per wife no acute/new chest pain   - Normal troponin  - EKG: Afib , right axis deviation, RV hypertrophy septal and inferior infarct age undeterm.   - No significant changes seen from previous EKG  - Continue home rosuvastatin, ASA   - Monitor in tele. - As per wife no acute/new chest pain   - Normal troponin  - EKG: Afib , right axis deviation, RV hypertrophy septal and inferior infarct age undeterm.   - No significant changes seen from previous EKG  - Continue home statin - pharm interchange for rosuvastatin, and ASA   - Monitor in tele. - As per wife no acute/new chest pain   - Normal troponin  - Continue home statin - pharm interchange for rosuvastatin, and ASA   - Monitor in tele.

## 2022-07-01 NOTE — H&P ADULT - NSHPPHYSICALEXAM_GEN_ALL_CORE
ICU Vital Signs Last 24 Hrs  T(C): 38.8 (30 Jun 2022 22:00), Max: 38.8 (30 Jun 2022 22:00)  T(F): 101.8 (30 Jun 2022 22:00), Max: 101.8 (30 Jun 2022 22:00)  HR: 120 (30 Jun 2022 21:00) (120 - 120)  BP: 144/93 (30 Jun 2022 21:00) (144/93 - 144/93)  RR: 16 (30 Jun 2022 21:00) (16 - 16)  SpO2: 95% (30 Jun 2022 21:00) (95% - 95%)

## 2022-07-01 NOTE — H&P ADULT - PROBLEM SELECTOR PLAN 1
- Pt recently DC s/p covid infection, today with increasing cough, sweating, diarrhea x1 and emesis x1.  Pt Chronically bed and wheelchair bound which predisposes him for atelectasis and superinfection s/p Covid.   - Meet sepsis criteria with fever, tachycardia, leukocytosis.   - Elevated lactate 2.6  - CT CAP: Mild-to-moderate patchy airspace disease or consolidation both lower lobes. Mild airspace disease and/or atelectasis at the lingula.  (It is a preliminary result)  - s/p Zosyn and vanco x1 dose, NS 2lt bolus, Tylenol suppository, Zofran IV x1.   - Continue Zosyn and vanco for now, IVF.  NPO for AMS.   - f/u procal, MRSA PCR, blood cultures.   - ID consult Dr. Manning - Pt recently DC s/p covid infection, today with increasing cough, sweating, diarrhea x1 and emesis x1.  Pt Chronically bed and wheelchair bound which predisposes him for atelectasis and superinfection s/p Covid.   - Meet sepsis criteria with fever, tachycardia, leukocytosis.   - Elevated lactate 2.6  - CT CAP: Mild-to-moderate patchy airspace disease or consolidation both lower lobes. Mild airspace disease and/or atelectasis at the lingula.  (It is a preliminary result)  - s/p Zosyn and vanco x1 dose, NS 2lt bolus, Tylenol suppository, Zofran IV x1.   - Continue Zosyn and vanco for now.   - f/u procal, MRSA PCR, blood cultures.   - ID consult Dr. Manning - Pt recently DC s/p covid infection, today with increasing cough, sweating, diarrhea x1 and emesis x1.  Pt Chronically bed and wheelchair bound which predisposes him for atelectasis and superinfection s/p Covid.   - Meet sepsis criteria with fever, tachycardia, leukocytosis.   - Elevated lactate 2.6  - CT CAP: Mild-to-moderate patchy airspace disease or consolidation both lower lobes. Mild airspace disease and/or atelectasis at the lingula.  (It is a preliminary result)  - s/p Zosyn and vanco x1 dose, NS 2lt bolus, Tylenol suppository, Zofran IV x1.   - Continue Zosyn and vanco for now.   - f/u procal, MRSA PCR, lactate, blood cultures.  - ID consult Dr. Manning - Pt recently DC s/p covid infection, today with increasing cough, sweating, diarrhea x1 and emesis x1.  Pt Chronically bed and wheelchair bound which predisposes him for atelectasis and superinfection s/p Covid.   - Meet sepsis criteria with fever, tachycardia, leukocytosis.   - Elevated lactate 2.6  - CT CAP: Mild-to-moderate patchy airspace disease or consolidation both lower lobes. Mild airspace disease and/or atelectasis at the lingula.  (It is a preliminary result)  - s/p Zosyn and vanco x1 dose, NS 2lt bolus ,   -Tylenol suppository, Zofran IV x1.   - Continue Zosyn and vanco for now.   - f/u procal, MRSA PCR, lactate, blood cultures.  - ID consult Dr. Manning

## 2022-07-01 NOTE — H&P ADULT - CARDIOVASCULAR
regular rate and rhythm/S1 S2 present/no murmur Cimzia Pregnancy And Lactation Text: This medication crosses the placenta but can be considered safe in certain situations. Cimzia may be excreted in breast milk. S1 S2 present/no murmur/Irregularly irregular rhythm/tachycardia

## 2022-07-01 NOTE — H&P ADULT - PROBLEM SELECTOR PLAN 5
- pt is bed and wheelchair bound, severe persisting peripheral neuropathy 2/2 prior traumatic brain bleed on Eliquis.   - Fall, aspiration, safety precautions  - At home on gabapentin 300 mg TID, buproprion 150 mg qd, tizanidine 4 mg TID, and ritalin qd.  Hold for now.   - Bedside PT - pt is bed and wheelchair bound, severe persisting peripheral neuropathy 2/2 prior traumatic brain bleed on Eliquis.   - Fall, aspiration, safety precautions  - At home on gabapentin 300 mg TID, buproprion 150 mg qd, tizanidine 4 mg TID, and ritalin qd. Continue home meds.   - Bedside PT - pt is bed and wheelchair bound, severe persisting peripheral neuropathy 2/2 prior traumatic brain bleed on Eliquis.   - Fall, aspiration, safety precautions  - At home on gabapentin 300 mg TID, buproprion, tizanidine 4 mg TID, and ritalin qd. Continue home meds.   - Bedside PT

## 2022-07-01 NOTE — H&P ADULT - PROBLEM SELECTOR PLAN 3
- Chronic. RVR in the setting of fever and dehydration.   - EKG: Afib , right axis deviation, RV hypertrophy septal and inferior infarct age undeterm.   - s/p Tylenol and IVF 2 lt bolus with rate in 90's on auscultation and monitor.   - TTE 6/15/22: Mild LV hypertrophy, normal systolic function, eLVEF of 60%.Grossly normal RV.  LA enlargement. Trace to mild MR / Trace TR.  - Hold home Cardizem  qd for now, pt is lethargic   - Hold home Eliquis for now   - Monitor in tele - Chronic. RVR in the setting of fever and dehydration.   - EKG: Afib , right axis deviation, RV hypertrophy septal and inferior infarct age undeterm.   - s/p Tylenol and IVF 2 lt bolus with rate in 90's on auscultation and monitor.   - TTE 6/15/22: Mild LV hypertrophy, normal systolic function, eLVEF of 60%.Grossly normal RV.  LA enlargement. Trace to mild MR / Trace TR.  - Continue home Cardizem  qd with hold parameters.   - Continue home Eliquis  - Monitor in tele - Chronic. RVR in the setting of fever and dehydration.   - EKG: Afib , right axis deviation, RV hypertrophy septal and inferior infarct age undeterm.   - s/p Tylenol and IVF 2 lt bolus with rate in 90's on auscultation and monitor.   - TTE 6/15/22: Mild LV hypertrophy, normal systolic function, eLVEF of 60%.Grossly normal RV.  LA enlargement. Trace to mild MR / Trace TR.  - Continue home Cardizem CD  120 qd with hold parameters.   - Continue home Eliquis 2.5 mg 2x day   - Monitor in tele

## 2022-07-01 NOTE — PROGRESS NOTE ADULT - PROBLEM SELECTOR PLAN 2
- BUN 21/Cr 1.3 on admission.  Baseline Cr 0.9  - LATOYA 2/2 current sepsis, dehydration   - s/p 2 lt NS bolus.  - Hold losartan for now   - Encourage fluid intake.   - Monitor and trend Cr.

## 2022-07-01 NOTE — PROGRESS NOTE ADULT - PROBLEM SELECTOR PLAN 5
- pt is bed and wheelchair bound, severe persisting peripheral neuropathy 2/2 prior traumatic brain bleed on Eliquis.   - Fall, aspiration, safety precautions  - At home on gabapentin 300 mg TID, buproprion, tizanidine 4 mg TID, and ritalin qd. Continue home meds.   - Bedside PT  Pt Chronically bed and wheelchair bound which predisposes him for atelectasis and superinfection s/p Covid.

## 2022-07-01 NOTE — PROGRESS NOTE ADULT - PROBLEM SELECTOR PLAN 7
- Chronic and stable   - Medication adjusted during the last admission   - Continue home Cardizem CD  120 with hold parameters.   - Losartan 25 qd  HOLD  in the setting or LATOYA  - Monitor BP

## 2022-07-01 NOTE — CONSULT NOTE ADULT - SUBJECTIVE AND OBJECTIVE BOX
[facility], Division of Infectious Diseases  LADONNA Ramirez, KAPIL Yun, NAKIA Cowan [facility]  [phone]    MELBA PARMAR  72y, Male  [id number]    HPI--  HPI:  Pt is a 72 y.o male with a PMH of HTN, AFIB, BPH, bedbound 2/2 TBI, HLD, CAD, peripheral neuropathy, glaucoma, who presented to the ED with increasing cough and sweating. History taken via phone from wife, pt was not proving information at the moment of the exam. Pt was recently admitted from  to  for Covid infection and Afib with RVR. Wife reports that today pt started to have sweating, not feeling good, increased cough, 1 episode of diarrhea and vomiting, and she decided to brought him in. She states that he denied SOB, and his mental status was at baseline.  She also reports that after the discharge pt was doing in general good with persistent mild cough. He had nurse evaluation at home 2 days ago and was doing good and yesterday was seen by neuro with no acute concerns.     ED course:   - Vitals: 144/93, , RR 16, temp 98.2 < 101.8, O2 sat 95% with NC 4lt   - Labs: WBC 27.28, N 93%. Hb 15.2. INR 1.37. Na 146.  BUN 21/Cr 1.3.  Glucose 162.  Lactate 2.6  - CT CAP: Mild-to-moderate patchy airspace disease or consolidation both lower lobes. Mild airspace disease and/or atelectasis at the lingula.  (It is a preliminary result)  - EKG: Afib , right axis deviation, RV hypertrophy septal and inferior infarct age undeterm.   - s/p Zosyn and vanco x1 dose, NS 2lt bolus, Tylenol suppository, Zofran IV x1.  (2022 00:35)    Pt seen and examined at bedside  Wife at bedside  Hx as above  Feeling better since admission      Active Medications--  acetaminophen     Tablet .. 650 milliGRAM(s) Oral every 6 hours PRN  apixaban 2.5 milliGRAM(s) Oral every 12 hours  aspirin  chewable 81 milliGRAM(s) Oral daily  atorvastatin 40 milliGRAM(s) Oral at bedtime  bethanechol 10 milliGRAM(s) Oral three times a day  buPROPion XL (24-Hour) . 150 milliGRAM(s) Oral daily  dextrose 5%. 1000 milliLiter(s) IV Continuous <Continuous>  diltiazem    milliGRAM(s) Oral daily  gabapentin 300 milliGRAM(s) Oral three times a day  latanoprost 0.005% Ophthalmic Solution 1 Drop(s) Both EYES at bedtime  methylphenidate 20 milliGRAM(s) Oral <User Schedule>  piperacillin/tazobactam IVPB.. 3.375 Gram(s) IV Intermittent every 8 hours  tamsulosin 0.4 milliGRAM(s) Oral at bedtime  tiZANidine 4 milliGRAM(s) Oral three times a day  vancomycin  IVPB 1000 milliGRAM(s) IV Intermittent every 24 hours    Antimicrobials:   piperacillin/tazobactam IVPB.. 3.375 Gram(s) IV Intermittent every 8 hours  vancomycin  IVPB 1000 milliGRAM(s) IV Intermittent every 24 hours    Immunologic:     ROS:  CONSTITUTIONAL: No fevers or chills. No weakness or headache. No weight changes.  EYES/ENT: No visual or hearing changes. No sore throat or throat pain .  NECK: No pain or stiffness  RESPIRATORY: No cough, wheezing, or hemoptysis. No shortness of breath  CARDIOVASCULAR: No chest pain or palpitations  GASTROINTESTINAL: No abdominal pain. No nausea or vomiting. No diarrhea or constipation.  GENITOURINARY: No dysuria, frequency or hematuria  NEUROLOGICAL: No numbness or weakness  SKIN: No itching or rashes  PSYCHIATRIC: Pleasant. Appropriate affect    Allergies: No Known Allergies    PMH -- TBI (traumatic brain injury)    HTN (hypertension)    Atrial fibrillation    DM (diabetes mellitus)    Peripheral neuropathy    Major depression    HLD (hyperlipidemia)    CAD (coronary artery disease)    BPH (benign prostatic hyperplasia)      PSH -- No significant past surgical history      FH -- No pertinent family history in first degree relatives      Social History --  EtOH: denies   Tobacco: denies   Drug Use: denies     Travel/Environmental/Occupational History:    Physical Exam--  Vital Signs Last 24 Hrs  T(F): 98.4 (2022 14:11), Max: 101.8 (2022 22:00)  HR: 100 (2022 14:11) (100 - 120)  BP: 138/80 (2022 14:11) (129/84 - 150/82)  RR: 18 (2022 14:11) (16 - 18)  SpO2: 93% (2022 14:11) (90% - 95%)  General: nontoxic-appearing, no acute distress  HEENT: NC/AT, EOMI, anicteric  Lungs: decreased b/l breath sounds  Heart: Regular rate and rhythm. No murmur, rub or gallop.  Abdomen: Soft. Nondistended. Nontender  Extremities: No cyanosis or clubbing. No edema.   Skin: Warm. Dry. Good turgor. No rash. No vasculitic stigmata.      Laboratory & Imaging Data--  CBC:                       12.4   27.12 )-----------( 210      ( 2022 05:22 )             38.0     CMP:     147<H>  |  115<H>  |  19  ----------------------------<  194<H>  3.6   |  26  |  1.10    Ca    8.2<L>      2022 05:22    TPro  5.6<L>  /  Alb  2.8<L>  /  TBili  0.8  /  DBili  x   /  AST  8<L>  /  ALT  15  /  AlkPhos  82  07-01    LIVER FUNCTIONS - ( 2022 05:22 )  Alb: 2.8 g/dL / Pro: 5.6 g/dL / ALK PHOS: 82 U/L / ALT: 15 U/L / AST: 8 U/L / GGT: x           Urinalysis Basic - ( 2022 02:39 )    Color: Yellow / Appearance: Clear / S.020 / pH: x  Gluc: x / Ketone: Negative  / Bili: Negative / Urobili: Negative   Blood: x / Protein: 30 mg/dL / Nitrite: Negative   Leuk Esterase: Trace / RBC: 0-2 /HPF / WBC 3-5   Sq Epi: x / Non Sq Epi: Few / Bacteria: Few        Microbiology: reviewed      Radiology: reviewed    < from: CT Abdomen and Pelvis No Cont (22 @ 23:35) >    ACC: [id number] EXAM:  CT ABDOMEN AND PELVIS                        ACC: [id number] EXAM:  CT CHEST                          PROCEDURE DATE:  2022          INTERPRETATION:  CLINICAL INFORMATION: Abnormal chest x-ray, airspace   opacities. Abdominal pain    COMPARISON: None.    CONTRAST/COMPLICATIONS:  IV Contrast: NONE  Oral Contrast: NONE  Complications: None reported at time of study completion    PROCEDURE:  CT of the Chest, Abdomen and Pelvis was performed. Sagittal and coronal   reformats were performed. The lack of intravenous contrast material   limits diagnostic sensitivity in evaluating the solid organs/vasculature.   Patient arm position at sides produces beam hardening artifact which   degrades image quality.    FINDINGS:  CHEST:  LUNGS AND LARGE AIRWAYS: Patent central airways. Mild/moderate bilateral   lower lobe patchy airspace opacities. Minimal lingular airspace opacity   and/or atelectasis.  PLEURA: No pleural effusion.  VESSELS: No aortic dilatation. Aortic /coronaryartery calcification.  HEART: Cardiomegaly. Trace pericardial effusion.  MEDIASTINUM AND ALYSSA: No lymphadenopathy.  CHEST WALL AND LOWER NECK: Gynecomastia    ABDOMEN AND PELVIS:  LIVER: Within normal limits.  BILE DUCTS: Normal caliber.  GALLBLADDER: Within normal limits.  SPLEEN: Within normal limits.  PANCREAS: Uncinate process acinar fullness compared to remainder of gland   (2/85)  ADRENALS: Within normal limits.  KIDNEYS/URETERS: Bilateral renal hypodensities, incompletely   characterized on noncontrast CT. No renal stones/obstructive uropathy.    BLADDER: Within normal limits.  REPRODUCTIVE ORGANS: Enlarged prostate.    BOWEL: No bowel obstruction. Normal appendix. No diverticulitis.  PERITONEUM: No ascites.  VESSELS: Atherosclerotic changes.  RETROPERITONEUM/LYMPH NODES: No lymphadenopathy.  ABDOMINAL WALL: Fat-containing inguinal hernias  BONES: Thoracolumbar spondylosis    IMPRESSION:  1.  Bilateral lower lobe bronchopneumonia.  2.  Uncinate pancreas acinar fullness compared to remainder of gland   (2/85). The pancreas lesion is not excludable. Consider contrast-enhanced   exam.    A preliminary report was given by [facility] RADIOLOGY: Ovi Peguero  Impression #2 discussed with VAHE YUN [id number], MD at 2022 7:43   AM.      --- End of Report ---            DOMINIQUE MCMILLAN MD; Attending Radiologist  This document has been electronically signed. 2022  8:00AM    < end of copied text >

## 2022-07-01 NOTE — PROGRESS NOTE ADULT - PROBLEM SELECTOR PLAN 4
- As per wife no acute/new chest pain   - Normal troponin  - Continue home statin - pharm interchange for rosuvastatin, and ASA   - Monitor in tele.

## 2022-07-01 NOTE — PROGRESS NOTE ADULT - PROBLEM SELECTOR PLAN 3
- Chronic. RVR in the setting of fever and dehydration.   - EKG: Afib , right axis deviation, RV hypertrophy septal and inferior infarct age undeterm.   - s/p Tylenol and IVF 2 lt bolus with rate in 90's on auscultation and monitor.   - TTE 6/15/22: Mild LV hypertrophy, normal systolic function, eLVEF of 60%.Grossly normal RV.  LA enlargement. Trace to mild MR / Trace TR.  - Continue home Cardizem CD  120 qd with hold parameters.   - Continue home Eliquis 2.5 mg 2x day   - Monitor in tele

## 2022-07-01 NOTE — PROGRESS NOTE ADULT - SUBJECTIVE AND OBJECTIVE BOX
Patient is a 72y old  Male who presents with a chief complaint of   HPI:  Pt is a 72 y.o male with a PMH of HTN, AFIB, BPH, bedbound 2/2 TBI, HLD, CAD, peripheral neuropathy, glaucoma, who presented to the ED with increasing cough and sweating. History taken via phone from wife, pt was not proving information at the moment of the exam. Pt was recently admitted from  to  for Covid infection and Afib with RVR. Wife reports that today pt started to have sweating, not feeling good, increased cough, 1 episode of diarrhea and vomiting, and she decided to brought him in. She states that he denied SOB, and his mental status was at baseline.  She also reports that after the discharge pt was doing in general good with persistent mild cough. He had nurse evaluation at home 2 days ago and was doing good and yesterday was seen by neuro with no acute concerns.     ED course:   - Vitals: 144/93, , RR 16, temp 98.2 < 101.8, O2 sat 95% with NC 4lt   - Labs: WBC 27.28, N 93%. Hb 15.2. INR 1.37. Na 146.  BUN 21/Cr 1.3.  Glucose 162.  Lactate 2.6  - CT CAP: Mild-to-moderate patchy airspace disease or consolidation both lower lobes. Mild airspace disease and/or atelectasis at the lingula.  (It is a preliminary result)  - EKG: Afib , right axis deviation, RV hypertrophy septal and inferior infarct age undeterm.   - s/p Zosyn and vanco x1 dose, NS 2lt bolus, Tylenol suppository, Zofran IV x1.  (2022 00:35)    INTERVAL HPI:      OVERNIGHT EVENTS:    Home Medications:  aspirin 81 mg oral tablet: 1 tab(s) orally once a day (2022 02:13)  bethanechol 10 mg oral tablet: 1 tab(s) orally 3 times a day at (8am, 3pm, 10pm) (2022 02:13)  buPROPion 75 mg oral tablet: 1 tab(s) orally 2 times a day (10am, 10pm) (2022 02:13)  Eliquis 2.5 mg oral tablet: 1 tab(s) orally 2 times a day (10am, 10pm)t (2022 02:13)  gabapentin 300 mg oral capsule: 1 cap(s) orally 3 times a day (8am, 3pm, 10pm (2022 02:13)  latanoprost 0.005% ophthalmic solution: 1 drop(s) to each affected eye once a day (in the evening) (2022 02:13)  methylphenidate 10 mg oral tablet: 2 tab(s) orally once a day (in the morning) (2022 02:13)  rosuvastatin 10 mg oral tablet: 1 tab(s) orally once a day (2022 02:13)  tamsulosin 0.4 mg oral capsule: 1 cap(s) orally once a day (in the evening) (:13)  tiZANidine 4 mg oral tablet: 1 tab(s) orally 3 times a day (2022 02:13)  Vitamin D3 25 mcg (1000 intl units) oral tablet: 1 tab(s) orally once a day (:13)      MEDICATIONS  (STANDING):  apixaban 2.5 milliGRAM(s) Oral every 12 hours  aspirin  chewable 81 milliGRAM(s) Oral daily  atorvastatin 40 milliGRAM(s) Oral at bedtime  bethanechol 10 milliGRAM(s) Oral three times a day  buPROPion XL (24-Hour) . 150 milliGRAM(s) Oral daily  cefepime   IVPB 2000 milliGRAM(s) IV Intermittent every 12 hours  dextrose 5%. 1000 milliLiter(s) (50 mL/Hr) IV Continuous <Continuous>  diltiazem    milliGRAM(s) Oral daily  gabapentin 300 milliGRAM(s) Oral three times a day  latanoprost 0.005% Ophthalmic Solution 1 Drop(s) Both EYES at bedtime  methylphenidate 20 milliGRAM(s) Oral <User Schedule>  mupirocin 2% Nasal 1 Application(s) Both Nostrils two times a day  tamsulosin 0.4 milliGRAM(s) Oral at bedtime  tiZANidine 4 milliGRAM(s) Oral three times a day  vancomycin  IVPB 1000 milliGRAM(s) IV Intermittent every 24 hours    MEDICATIONS  (PRN):  acetaminophen     Tablet .. 650 milliGRAM(s) Oral every 6 hours PRN Temp greater or equal to 38C (100.4F), Mild Pain (1 - 3)      Allergies    No Known Allergies    Intolerances        Social History:  Lives with wife.  Pt is bedbound and wheelchair bound. Pt is reliant on wife for ADLs.   No h/o alcohol, tobacco, or recreational drug use  COVID vaccinated x3, last in february with Moderna. (2022 00:35)      REVIEW OF SYSTEMS:  CONSTITUTIONAL: No fever, No chills, No fatigue, No myalgia, No Body ache, No Weakness  EYES: No eye pain,  No visual disturbances, No discharge, NO Redness  ENMT:  No ear pain, No nose bleed, No vertigo; No sinus pain, NO throat pain, No Congestion  NECK: No pain, No stiffness  RESPIRATORY: No cough, NO wheezing, No  hemoptysis, NO  shortness of breath  CARDIOVASCULAR: No chest pain, palpitations  GASTROINTESTINAL: No abdominal pain, NO epigastric pain. No nausea, No vomiting; No diarrhea, No constipation. [  ] BM  GENITOURINARY: No dysuria, No frequency, No urgency, No hematuria, NO incontinence  NEUROLOGICAL: No headaches, No dizziness, No numbness, No tingling, No tremors, No weakness  EXT: No Swelling, No Pain, No Edema  SKIN:  [  ] No itching, burning, rashes, or lesions   MUSCULOSKELETAL: No joint pain ,No Jt swelling; No muscle pain, No back pain, No extremity pain  PSYCHIATRIC: No depression,  No anxiety,  No mood swings ,No difficulty sleeping at night  PAIN SCALE: [  ] None  [  ] Other-  ROS Unable to obtain due to - [  ] Dementia  [  ] Lethargy [  ] Drowsy [  ] Sedated [  ] non verbal  REST OF REVIEW Of SYSTEM - [  ] Normal     Vital Signs Last 24 Hrs  T(C): 36.9 (2022 21:12), Max: 38.8 (2022 22:00)  T(F): 98.4 (2022 21:12), Max: 101.8 (2022 22:00)  HR: 74 (2022 21:12) (74 - 106)  BP: 112/76 (2022 21:12) (112/76 - 150/82)  BP(mean): --  RR: 19 (2022 21:12) (18 - 19)  SpO2: 95% (2022 21:12) (90% - 95%)  Finger Stick         @ 07:  -   @ 21:20  --------------------------------------------------------  IN: 0 mL / OUT: 750 mL / NET: -750 mL        PHYSICAL EXAM:  GENERAL:  [  ] NAD , [  ] well appearing, [  ] Agitated, [  ] Drowsy,  [  ] Lethargy, [  ] confused   HEAD:  [  ] Normal, [  ] Other  EYES:  [  ] EOMI, [  ] PERRLA, [  ] conjunctiva and sclera clear normal, [  ] Other,  [  ] Pallor,[  ] Discharge  ENMT:  [  ] Normal, [  ] Moist mucous membranes, [  ] Good dentition, [  ] No Thrush  NECK:  [  ] Supple, [  ] No JVD, [  ] Normal thyroid, [  ] Lymphadenopathy [  ] Other  CHEST/LUNG:  [  ] Clear to auscultation bilaterally, [  ] Breath Sounds equal B/L / Decrease, [  ] poor effort  [  ] No rales, [  ] No rhonchi  [  ]  No wheezing,   HEART:  [  ] Regular rate and rhythm, [  ] tachycardia, [  ] Bradycardia,  [  ] irregular  [  ] No murmurs, No rubs, No gallops, [  ] PPM in place (Mfr:  )  ABDOMEN:  [  ] Soft, [  ] Nontender, [  ] Nondistended, [  ] No mass, [  ] Bowel sounds present, [  ] obese  NERVOUS SYSTEM:  [  ] Alert & Oriented X3, [  ] Nonfocal  [  ] Confusion  [  ] Encephalopathic [  ] Sedated [  ] Unable to assess, [  ] Dementia [  ] Other-  EXTREMITIES: [  ] 2+ Peripheral Pulses, No clubbing, No cyanosis,  [  ] edema B/L lower EXT. [  ] PVD stasis skin changes B/L Lower EXT, [  ] wound  LYMPH: No lymphadenopathy noted  SKIN:  [  ] No rashes or lesions, [  ] Pressure Ulcers, [  ] ecchymosis, [  ] Skin Tears, [  ] Other    DIET: Diet, Regular (22 @ 16:25)      LABS:                        12.4   27.12 )-----------( 210      ( 2022 05:22 )             38.0     2022 05:22    147    |  115    |  19     ----------------------------<  194    3.6     |  26     |  1.10     Ca    8.2        2022 05:22    TPro  5.6    /  Alb  2.8    /  TBili  0.8    /  DBili  x      /  AST  8      /  ALT  15     /  AlkPhos  82     2022 05:22    PT/INR - ( 2022 22:20 )   PT: 16.1 sec;   INR: 1.37 ratio         PTT - ( 2022 22:20 )  PTT:34.4 sec  Urinalysis Basic - ( 2022 02:39 )    Color: Yellow / Appearance: Clear / S.020 / pH: x  Gluc: x / Ketone: Negative  / Bili: Negative / Urobili: Negative   Blood: x / Protein: 30 mg/dL / Nitrite: Negative   Leuk Esterase: Trace / RBC: 0-2 /HPF / WBC 3-5   Sq Epi: x / Non Sq Epi: Few / Bacteria: Few                           Anemia Panel:      Thyroid Panel:                RADIOLOGY & ADDITIONAL TESTS:      HEALTH ISSUES - PROBLEM Dx:  Sepsis due to pneumonia    Atrial fibrillation with RVR    Hypertension    Glaucoma    LATOYA (acute kidney injury)    Need for prophylactic measure    History of BPH    H/O traumatic brain injury    Chronic major depressive disorder    CAD (coronary artery disease)            Consultant(s) Notes Reviewed:  [  ] YES     Care Discussed with [X] Consultants  [  ] Patient  [  ] Family [  ] HCP [  ]   [  ] Social Service  [  ] RN, [  ] Physical Therapy,[  ] Palliative care team  DVT PPX: [  ] Lovenox, [  ] S C Heparin, [  ] Coumadin, [  ] Xarelto, [  ] Eliquis, [  ] Pradaxa, [  ] IV Heparin drip, [  ] SCD [  ] Contraindication 2 to GI Bleed,[  ] Ambulation [  ] Contraindicated 2 to  bleed [  ] Contraindicated 2 to Brain Bleed  Advanced directive: [  ] None, [  ] DNR/DNI Patient is a 72y old  Male who presents with a chief complaint of   HPI:  Pt is a 72 y.o male with a PMH of HTN, AFIB, BPH, bedbound 2/2 TBI, HLD, CAD, peripheral neuropathy, glaucoma, who presented to the ED with increasing cough and sweating. History taken via phone from wife, pt was not proving information at the moment of the exam. Pt was recently admitted from  to  for Covid infection and Afib with RVR. Wife reports that today pt started to have sweating, not feeling good, increased cough, 1 episode of diarrhea and vomiting, and she decided to brought him in. She states that he denied SOB, and his mental status was at baseline.  She also reports that after the discharge pt was doing in general good with persistent mild cough. He had nurse evaluation at home 2 days ago and was doing good and yesterday was seen by neuro with no acute concerns.     ED course:   - Vitals: 144/93, , RR 16, temp 98.2 < 101.8, O2 sat 95% with NC 4lt   - Labs: WBC 27.28, N 93%. Hb 15.2. INR 1.37. Na 146.  BUN 21/Cr 1.3.  Glucose 162.  Lactate 2.6  - CT CAP: Mild-to-moderate patchy airspace disease or consolidation both lower lobes. Mild airspace disease and/or atelectasis at the lingula.  (It is a preliminary result)  - EKG: Afib , right axis deviation, RV hypertrophy septal and inferior infarct age undeterm.   - s/p Zosyn and vanco x1 dose, NS 2lt bolus, Tylenol suppository, Zofran IV x1.  (2022 00:35)    INTERVAL HPI:  22: pt seen, Examined, feels much better, on IV cefepime & Vanco, WBC elevated, seen by ID, No complaints      OVERNIGHT EVENTS:   NONE    Home Medications:  aspirin 81 mg oral tablet: 1 tab(s) orally once a day (2022 02:13)  bethanechol 10 mg oral tablet: 1 tab(s) orally 3 times a day at (8am, 3pm, 10pm) (2022 02:13)  buPROPion 75 mg oral tablet: 1 tab(s) orally 2 times a day (10am, 10pm) (2022 02:13)  Eliquis 2.5 mg oral tablet: 1 tab(s) orally 2 times a day (10am, 10pm)t (:13)  gabapentin 300 mg oral capsule: 1 cap(s) orally 3 times a day (8am, 3pm, 10pm (2022 02:13)  latanoprost 0.005% ophthalmic solution: 1 drop(s) to each affected eye once a day (in the evening) (:13)  methylphenidate 10 mg oral tablet: 2 tab(s) orally once a day (in the morning) (:)  rosuvastatin 10 mg oral tablet: 1 tab(s) orally once a day ()  tamsulosin 0.4 mg oral capsule: 1 cap(s) orally once a day (in the evening) (:)  tiZANidine 4 mg oral tablet: 1 tab(s) orally 3 times a day (:)  Vitamin D3 25 mcg (1000 intl units) oral tablet: 1 tab(s) orally once a day (:)      MEDICATIONS  (STANDING):  apixaban 2.5 milliGRAM(s) Oral every 12 hours  aspirin  chewable 81 milliGRAM(s) Oral daily  atorvastatin 40 milliGRAM(s) Oral at bedtime  bethanechol 10 milliGRAM(s) Oral three times a day  buPROPion XL (24-Hour) . 150 milliGRAM(s) Oral daily  cefepime   IVPB 2000 milliGRAM(s) IV Intermittent every 12 hours  dextrose 5%. 1000 milliLiter(s) (50 mL/Hr) IV Continuous <Continuous>  diltiazem    milliGRAM(s) Oral daily  gabapentin 300 milliGRAM(s) Oral three times a day  latanoprost 0.005% Ophthalmic Solution 1 Drop(s) Both EYES at bedtime  methylphenidate 20 milliGRAM(s) Oral <User Schedule>  mupirocin 2% Nasal 1 Application(s) Both Nostrils two times a day  tamsulosin 0.4 milliGRAM(s) Oral at bedtime  tiZANidine 4 milliGRAM(s) Oral three times a day  vancomycin  IVPB 1000 milliGRAM(s) IV Intermittent every 24 hours    MEDICATIONS  (PRN):  acetaminophen     Tablet .. 650 milliGRAM(s) Oral every 6 hours PRN Temp greater or equal to 38C (100.4F), Mild Pain (1 - 3)      Allergies    No Known Allergies    Intolerances        Social History:  Lives with wife.  Pt is bedbound and wheelchair bound. Pt is reliant on wife for ADLs.   No h/o alcohol, tobacco, or recreational drug use  COVID vaccinated x3, last in february with Moderna. (2022 00:35)      REVIEW OF SYSTEMS: i feel much better   CONSTITUTIONAL: No fever, No chills, No fatigue, No myalgia, No Body ache, No Weakness  EYES: No eye pain,  No visual disturbances, No discharge, NO Redness  ENMT:  No ear pain, No nose bleed, No vertigo; No sinus pain, NO throat pain, No Congestion  NECK: No pain, No stiffness  RESPIRATORY: No cough, NO wheezing, No  hemoptysis, NO  shortness of breath  CARDIOVASCULAR: No chest pain, palpitations  GASTROINTESTINAL: No abdominal pain, NO epigastric pain. No nausea, No vomiting; No diarrhea, No constipation. [  ] BM  GENITOURINARY: No dysuria, No frequency, No urgency, No hematuria, NO incontinence  NEUROLOGICAL: No headaches, No dizziness, No numbness, No tingling, No tremors, No weakness  EXT: No Swelling, No Pain, No Edema  SKIN:  [x  ] No itching, burning, rashes, or lesions   MUSCULOSKELETAL: No joint pain ,No Jt swelling; No muscle pain, No back pain, No extremity pain  PSYCHIATRIC: No depression,  No anxiety,  No mood swings ,No difficulty sleeping at night  PAIN SCALE: [ x ] None  [  ] Other-  ROS Unable to obtain due to - [  ] Dementia  [  ] Lethargy [  ] Drowsy [  ] Sedated [  ] non verbal  REST OF REVIEW Of SYSTEM - [ x ] Normal     Vital Signs Last 24 Hrs  T(C): 36.9 (2022 21:12), Max: 38.8 (2022 22:00)  T(F): 98.4 (2022 21:12), Max: 101.8 (2022 22:00)  HR: 74 (2022 21:12) (74 - 106)  BP: 112/76 (2022 21:12) (112/76 - 150/82)  BP(mean): --  RR: 19 (2022 21:12) (18 - 19)  SpO2: 95% (2022 21:12) (90% - 95%)  Finger Stick         @ 07:01  -   @ 21:20  --------------------------------------------------------  IN: 0 mL / OUT: 750 mL / NET: -750 mL        PHYSICAL EXAM:  GENERAL:  [x  ] NAD , [ x ] well appearing, [  ] Agitated, [  ] Drowsy,  [  ] Lethargy, [  ] confused   HEAD:  [ x ] Normal, [  ] Other  EYES:  [x  ] EOMI, [x  ] PERRLA, [x ] conjunctiva and sclera clear normal, [  ] Other,  [  ] Pallor,[  ] Discharge  ENMT:  [x  ] Normal, [ x ] Moist mucous membranes, [  ] Good dentition, [x  ] No Thrush  NECK:  [x  ] Supple, [x  ] No JVD, [  x] Normal thyroid, [  ] Lymphadenopathy [  ] Other  CHEST/LUNG:  [  ] Clear to auscultation bilaterally, [  x] Breath Sounds equal B/L / Decrease, [ x ] poor effort  [x  ] No rales, [x  ] b/l  rhonchi  [x  ]  No wheezing,   HEART:  [  ] Regular rate and rhythm, [  ] tachycardia, [  ] Bradycardia,  [ x ] irregular  [ x ] No murmurs, No rubs, No gallops, [  ] PPM in place (Mfr:  )  ABDOMEN:  [ x ] Soft, [x  ] Nontender, [ x ] Nondistended, [ x ] No mass, [x  ] Bowel sounds present, [  ] obese  NERVOUS SYSTEM:  [x  ] Alert & Oriented X3, [  ] Nonfocal  [  ] Confusion  [  ] Encephalopathic [  ] Sedated [  ] Unable to assess, [  ] Dementia [x  ] Other- B/L Lower Ext Contractures & Hand Contractures  EXTREMITIES: [ x ] 2+ Peripheral Pulses, No clubbing, No cyanosis,  [  ] edema B/L lower EXT. [  ] PVD stasis skin changes B/L Lower EXT, [  ] wound  LYMPH: No lymphadenopathy noted  SKIN:  [  ] No rashes or lesions, [  ] Pressure Ulcers, [  ] ecchymosis, [  ] Skin Tears, [x  ] Other-dry, scabs on legs     DIET: Diet, Regular (22 @ 16:25)      LABS:                        12.4   27.12 )-----------( 210      ( 2022 05:22 )             38.0     2022 05:22    147    |  115    |  19     ----------------------------<  194    3.6     |  26     |  1.10     Ca    8.2        2022 05:22    TPro  5.6    /  Alb  2.8    /  TBili  0.8    /  DBili  x      /  AST  8      /  ALT  15     /  AlkPhos  82     2022 05:22    PT/INR - ( 2022 22:20 )   PT: 16.1 sec;   INR: 1.37 ratio         PTT - ( 2022 22:20 )  PTT:34.4 sec  Urinalysis Basic - ( 2022 02:39 )    Color: Yellow / Appearance: Clear / S.020 / pH: x  Gluc: x / Ketone: Negative  / Bili: Negative / Urobili: Negative   Blood: x / Protein: 30 mg/dL / Nitrite: Negative   Leuk Esterase: Trace / RBC: 0-2 /HPF / WBC 3-5   Sq Epi: x / Non Sq Epi: Few / Bacteria: Few      RADIOLOGY & ADDITIONAL TESTS:  < from: CT Chest No Cont (22 @ 23:35) >  CONTRAST/COMPLICATIONS:  IV Contrast: NONE  Oral Contrast: NONE  Complications: None reported at time of study completion    PROCEDURE:  CT of the Chest, Abdomen and Pelvis was performed. Sagittal and coronal   reformats were performed. The lack of intravenous contrast material   limits diagnostic sensitivity in evaluating the solid organs/vasculature.   Patient arm position at sides produces beam hardening artifact which   degrades image quality.    FINDINGS:  CHEST:  LUNGS AND LARGE AIRWAYS: Patent central airways. Mild/moderate bilateral   lower lobe patchy airspace opacities. Minimal lingular airspace opacity   and/or atelectasis.  PLEURA: No pleural effusion.  VESSELS: No aortic dilatation. Aortic /coronaryartery calcification.  HEART: Cardiomegaly. Trace pericardial effusion.  MEDIASTINUM AND ALYSSA: No lymphadenopathy.  CHEST WALL AND LOWER NECK: Gynecomastia    ABDOMEN AND PELVIS:  LIVER: Within normal limits.  BILE DUCTS: Normal caliber.  GALLBLADDER: Within normal limits.  SPLEEN: Within normal limits.  PANCREAS: Uncinate process acinar fullness compared to remainder of gland   (2/85)  ADRENALS: Within normal limits.  KIDNEYS/URETERS: Bilateral renal hypodensities, incompletely   characterized on noncontrast CT. No renal stones/obstructive uropathy.    BLADDER: Within normal limits.  REPRODUCTIVE ORGANS: Enlarged prostate.    BOWEL: No bowel obstruction. Normal appendix. No diverticulitis.  PERITONEUM: No ascites.  VESSELS: Atherosclerotic changes.  RETROPERITONEUM/LYMPH NODES: No lymphadenopathy.  ABDOMINAL WALL: Fat-containing inguinal hernias  BONES: Thoracolumbar spondylosis    IMPRESSION:  1.  Bilateral lower lobe bronchopneumonia.  2.  Uncinate pancreas acinar fullness compared to remainder of gland   (). The pancreas lesion is not excludable. Consider contrast-enhanced   exam.    A preliminary report was given by [facility] RADIOLOGY: Ovi Peguero  Impression #2 discussed with VAHE YUN [id number], MD at 2022 7:43   AM.        < end of copied text >      HEALTH ISSUES - PROBLEM Dx:  Sepsis due to pneumonia    Atrial fibrillation with RVR    Hypertension    Glaucoma    LATOYA (acute kidney injury)    Need for prophylactic measure    History of BPH    H/O traumatic brain injury    Chronic major depressive disorder    CAD (coronary artery disease)            Consultant(s) Notes Reviewed:  [x  ] YES     Care Discussed with [X] Consultants  [x  ] Patient  [ x ] Family- wife  [  ] HCP [  ]   [  ] Social Service  [x  ] RN, [  ] Physical Therapy,[  ] Palliative care team  DVT PPX: [  ] Lovenox, [  ] S C Heparin, [  ] Coumadin, [  ] Xarelto, [ x ] Eliquis, [  ] Pradaxa, [  ] IV Heparin drip, [  ] SCD [  ] Contraindication 2 to GI Bleed,[  ] Ambulation [  ] Contraindicated 2 to  bleed [  ] Contraindicated 2 to Brain Bleed  Advanced directive: [ x ] None, [  ] DNR/DNI

## 2022-07-01 NOTE — H&P ADULT - MUSCULOSKELETAL
no joint erythema/no joint warmth Contractures b/l lower Ext & B/L Upper Ext/no joint erythema/no joint warmth

## 2022-07-01 NOTE — H&P ADULT - HISTORY OF PRESENT ILLNESS
Pt is a 72 y.o male with a PMH of HTN, AFIB, BPH, bedbound 2/2 TBI, HLD, CAD, peripheral neuropathy, glaucoma, who presented to the ED with increasing cough and sweating. History taken via phone from wife, pt is lethargic at the moment of the exam. Pt was recently admitted from 6/17 to 6/23 for Covid infection and Afib with RVR. Wife reports that today pt started to have sweating, not feeling good, increased cough, 1 episode of diarrhea and vomiting, and she decided to brought him in. She states that he denied SOB, and his mental status was at baseline.  She also reports that after the discharge pt was doing in general good with persistent mild cough. He had nurse evaluation at home 2 days ago and was doing good and yesterday was seen by neuro with no acute concerns.     ED course:   - Vitals: 144/93, , RR 16, temp 98.2 < 101.8, O2 sat 95% with NC 4lt   - Labs: WBC 27.28, N 93%. Hb 15.2. INR 1.37. Na 146.  BUN 21/Cr 1.3.  Glucose 162.  Lactate 2.6  - CT CAP: Mild-to-moderate patchy airspace disease or consolidation both lower lobes. Mild airspace disease and/or atelectasis at the lingula.  (It is a preliminary result)  - EKG: Afib , right axis deviation, RV hypertrophy septal and inferior infarct age undeterm.   - s/p Zosyn and vanco x1 dose, NS 2lt bolus, Tylenol suppository, Zofran IV x1.  Pt is a 72 y.o male with a PMH of HTN, AFIB, BPH, bedbound 2/2 TBI, HLD, CAD, peripheral neuropathy, glaucoma, who presented to the ED with increasing cough and sweating. History taken via phone from wife, pt was not proving information at the moment of the exam. Pt was recently admitted from 6/17 to 6/23 for Covid infection and Afib with RVR. Wife reports that today pt started to have sweating, not feeling good, increased cough, 1 episode of diarrhea and vomiting, and she decided to brought him in. She states that he denied SOB, and his mental status was at baseline.  She also reports that after the discharge pt was doing in general good with persistent mild cough. He had nurse evaluation at home 2 days ago and was doing good and yesterday was seen by neuro with no acute concerns.     ED course:   - Vitals: 144/93, , RR 16, temp 98.2 < 101.8, O2 sat 95% with NC 4lt   - Labs: WBC 27.28, N 93%. Hb 15.2. INR 1.37. Na 146.  BUN 21/Cr 1.3.  Glucose 162.  Lactate 2.6  - CT CAP: Mild-to-moderate patchy airspace disease or consolidation both lower lobes. Mild airspace disease and/or atelectasis at the lingula.  (It is a preliminary result)  - EKG: Afib , right axis deviation, RV hypertrophy septal and inferior infarct age undeterm.   - s/p Zosyn and vanco x1 dose, NS 2lt bolus, Tylenol suppository, Zofran IV x1.

## 2022-07-01 NOTE — H&P ADULT - PROBLEM SELECTOR PLAN 2
- BUN 21/Cr 1.3 on admission.  Baseline Cr 0.9  - LATOYA 2/2 current sepsis, dehydration   - s/p 2 lt NS bolus.  - Continue maintenance IVF.    - Monitor and trend Cr. - BUN 21/Cr 1.3 on admission.  Baseline Cr 0.9  - LATOYA 2/2 current sepsis, dehydration   - s/p 2 lt NS bolus.  - Encourage fluid intake.   - Monitor and trend Cr. - BUN 21/Cr 1.3 on admission.  Baseline Cr 0.9  - LATOYA 2/2 current sepsis, dehydration   - s/p 2 lt NS bolus.  - Hold losartan for now   - Encourage fluid intake.   - Monitor and trend Cr.

## 2022-07-01 NOTE — H&P ADULT - RESPIRATORY
no wheezes/airway patent/diminished breath sounds, L/diminished breath sounds, R/rhonchi no wheezes/no rales/no rhonchi/no respiratory distress/airway patent/diminished breath sounds, L/diminished breath sounds, R/rhonchi

## 2022-07-01 NOTE — PHARMACOTHERAPY INTERVENTION NOTE - COMMENTS
Patient is a 72y M presenting with pneumonia. Patient with recent hospitalization for COVID-19. Due to concern for resistant organisms, patient is being treated empirically with vancomycin + Zosyn. Discussed with Dr. Ady Bhardwaj risk of nephrotoxicity with vancomycin + Zosyn combination and recommended cefepime 2g IV Q12 to replace Zosyn. Per discussion, ID team to evaluate the patient shortly and will modify antibiotics as appropriate.

## 2022-07-01 NOTE — PROGRESS NOTE ADULT - TIME BILLING
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   Pt seen, examined, case & care plan d/w pt, at detail.  ID  Dr ANA Bhardwaj p- IV Abx  Cefepime & IV Vanco   -, Nasal MRSA PCR + Start Bactroban to nares 2x day x 5 days   PT Eval  PO diet -S & S Eval-Regular diet, MBS   -D/W Wife at detail.  AM Labs  Total care time is 45 minutes.

## 2022-07-01 NOTE — ED ADULT NURSE NOTE - OBJECTIVE STATEMENT
Pt BIBEMS for vomiting, fever and cough.  Pt recently diagnosed with covid x few weeks ago.  + congestd cough noted.  Bilat lung sounds diminished.  No resp. distress/SOB noted.  Denies any chest pain, no cardiac events noted.  Pt vomit and diarrhea x 1 pta, no n/v/d noted at this time. Skin intact.  Temp 101.8 rectally- tylenol given.

## 2022-07-01 NOTE — H&P ADULT - NSICDXPASTMEDICALHX_GEN_ALL_CORE_FT
PAST MEDICAL HISTORY:  Atrial fibrillation     BPH (benign prostatic hyperplasia)     CAD (coronary artery disease)     HLD (hyperlipidemia)     HTN (hypertension)     Major depression     Peripheral neuropathy     TBI (traumatic brain injury)      PAST MEDICAL HISTORY:  Atrial fibrillation     BPH (benign prostatic hyperplasia)     CAD (coronary artery disease)     HLD (hyperlipidemia)     HTN (hypertension)     Major depression     Peripheral neuropathy     Pneumonia due to COVID-19 virus June 2022    TBI (traumatic brain injury) brain bleed

## 2022-07-01 NOTE — PHARMACOTHERAPY INTERVENTION NOTE - COMMENTS
Patient with a history of afib currently ordered for Eliquis 2.5 mg BID - discussed with Dr. Ady Bhardwaj given patient's history of brain bleed appropriate to continue on lower dosing of Eliquis although patient does not meet the criteria for the lower dosing. Patient was recently restarted on Eliquis by cardio - will continue home dose of Eliquis 2.5 mg BID.

## 2022-07-01 NOTE — H&P ADULT - ASSESSMENT
Pt is a 72 y.o male with a PMH of HTN, AFIB, BPH, bedbound 2/2 TBI, HLD, CAD, peripheral neuropathy, glaucoma, who presented to the ED for sweats and increasing cough.       Vitals: 144/93, , RR 16, temp 98.2 < 101.8 O2 sat 95% with NC 4lt   - Labs: WBC 27.28, N 93%. Hb 15.2. INR 1.37. Na 146.    Glucose 162.  Lactate 2.6   Pt is a 72 y.o male with a PMH of HTN, AFIB, BPH, bedbound 2/2 TBI, HLD, CAD, peripheral neuropathy, glaucoma, who presented to the ED for sweats and increasing cough.    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

## 2022-07-01 NOTE — SWALLOW BEDSIDE ASSESSMENT ADULT - COMMENTS
Intermittent coughing before, during and after PO trials, however, did not appear related to PO Chart reviewed order received for swallow eval.  Pt received upright in bed A&A Ox4, +O2NC, +baseline coughing which continued intermittently throughout eval, pain scale 0/10 pre & post eval.  Swallow eval completed see below for details.  Pt left as received NAD VERNON Marquez & Dr. Bhardwaj notified.  Will follow for MBS if warranted by MD.    Per charting, pt is a "Pt is a 72 y.o male with a PMH of HTN, AFIB, BPH, bedbound 2/2 TBI, HLD, CAD, peripheral neuropathy, glaucoma, who presented to the ED with increasing cough and sweating. History taken via phone from wife, pt was not proving information at the moment of the exam. Pt was recently admitted from 6/17 to 6/23 for Covid infection and Afib with RVR. Wife reports that today pt started to have sweating, not feeling good, increased cough, 1 episode of diarrhea and vomiting, and she decided to brought him in. She states that he denied SOB, and his mental status was at baseline.  She also reports that after the discharge pt was doing in general good with persistent mild cough. He had nurse evaluation at home 2 days ago and was doing good and yesterday was seen by neuro with no acute concerns"    CT Chest 6/30/22: "1.  Bilateral lower lobe bronchopneumonia.  2.  Uncinate pancreas acinar fullness compared to remainder of gland   (2/85). The pancreas lesion is not excludable. Consider contrast-enhanced   exam."    WBC7/1/22: "27.12"

## 2022-07-01 NOTE — SWALLOW BEDSIDE ASSESSMENT ADULT - ASR SWALLOW RECOMMEND DIAG
Would rx upgrade to Regular with Thin liquids and consideration of MBS for objective view of pharyngeal stage to rule out baseline coughing vs coughing due to penetration/aspiration as pt admitted with PNA per charting.

## 2022-07-01 NOTE — H&P ADULT - PROBLEM SELECTOR PLAN 10
- DVT prophylaxis: pt at home on Eliquis, hold for now.  Continue Lovenox. - DVT prophylaxis: Continue home Eliquis.

## 2022-07-01 NOTE — SWALLOW BEDSIDE ASSESSMENT ADULT - SWALLOW EVAL: DIAGNOSIS
Oral & pharyngeal stages deemed WFL at bedside, no overt s/s aspiration noted.  Pt noted with +baseline coughing and intermittent coughing in between trials, however, coughing did not appear related to PO.  Would rx upgrade to Regular with Thin liquids and consideration of MBS for objective view of pharyngeal stage to rule out baseline coughing vs coughing due to penetration/aspiration as pt admitted with PNA per charting.

## 2022-07-01 NOTE — H&P ADULT - ATTENDING COMMENTS
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   Pt seen, examined, case & care plan d/w pt, residents at detail.  ID  Dr [facility]in [facility]- IV Abx , Nasal MRSA PCR   PT Eval  PO diet -S & S Eval  AM Labs

## 2022-07-02 LAB
ALBUMIN SERPL ELPH-MCNC: 2.5 G/DL — LOW (ref 3.3–5)
ALP SERPL-CCNC: 68 U/L — SIGNIFICANT CHANGE UP (ref 40–120)
ALT FLD-CCNC: 13 U/L — SIGNIFICANT CHANGE UP (ref 12–78)
ANION GAP SERPL CALC-SCNC: 6 MMOL/L — SIGNIFICANT CHANGE UP (ref 5–17)
AST SERPL-CCNC: 9 U/L — LOW (ref 15–37)
BILIRUB SERPL-MCNC: 0.7 MG/DL — SIGNIFICANT CHANGE UP (ref 0.2–1.2)
BUN SERPL-MCNC: 14 MG/DL — SIGNIFICANT CHANGE UP (ref 7–23)
CALCIUM SERPL-MCNC: 8.3 MG/DL — LOW (ref 8.5–10.1)
CHLORIDE SERPL-SCNC: 110 MMOL/L — HIGH (ref 96–108)
CO2 SERPL-SCNC: 26 MMOL/L — SIGNIFICANT CHANGE UP (ref 22–31)
CREAT SERPL-MCNC: 1 MG/DL — SIGNIFICANT CHANGE UP (ref 0.5–1.3)
CULTURE RESULTS: SIGNIFICANT CHANGE UP
EGFR: 81 ML/MIN/1.73M2 — SIGNIFICANT CHANGE UP
GLUCOSE SERPL-MCNC: 132 MG/DL — HIGH (ref 70–99)
HCT VFR BLD CALC: 32.7 % — LOW (ref 39–50)
HGB BLD-MCNC: 10.8 G/DL — LOW (ref 13–17)
MCHC RBC-ENTMCNC: 26.7 PG — LOW (ref 27–34)
MCHC RBC-ENTMCNC: 33 GM/DL — SIGNIFICANT CHANGE UP (ref 32–36)
MCV RBC AUTO: 80.9 FL — SIGNIFICANT CHANGE UP (ref 80–100)
NRBC # BLD: 0 /100 WBCS — SIGNIFICANT CHANGE UP (ref 0–0)
PLATELET # BLD AUTO: 179 K/UL — SIGNIFICANT CHANGE UP (ref 150–400)
POTASSIUM SERPL-MCNC: 3.6 MMOL/L — SIGNIFICANT CHANGE UP (ref 3.5–5.3)
POTASSIUM SERPL-SCNC: 3.6 MMOL/L — SIGNIFICANT CHANGE UP (ref 3.5–5.3)
PROT SERPL-MCNC: 5.2 G/DL — LOW (ref 6–8.3)
RBC # BLD: 4.04 M/UL — LOW (ref 4.2–5.8)
RBC # FLD: 14.4 % — SIGNIFICANT CHANGE UP (ref 10.3–14.5)
SODIUM SERPL-SCNC: 142 MMOL/L — SIGNIFICANT CHANGE UP (ref 135–145)
SPECIMEN SOURCE: SIGNIFICANT CHANGE UP
WBC # BLD: 8.83 K/UL — SIGNIFICANT CHANGE UP (ref 3.8–10.5)
WBC # FLD AUTO: 8.83 K/UL — SIGNIFICANT CHANGE UP (ref 3.8–10.5)

## 2022-07-02 RX ORDER — SENNA PLUS 8.6 MG/1
2 TABLET ORAL AT BEDTIME
Refills: 0 | Status: DISCONTINUED | OUTPATIENT
Start: 2022-07-02 | End: 2022-07-06

## 2022-07-02 RX ORDER — SOD,AMMONIUM,POTASSIUM LACTATE
1 CREAM (GRAM) TOPICAL
Refills: 0 | Status: DISCONTINUED | OUTPATIENT
Start: 2022-07-02 | End: 2022-07-06

## 2022-07-02 RX ADMIN — SENNA PLUS 2 TABLET(S): 8.6 TABLET ORAL at 22:35

## 2022-07-02 RX ADMIN — TIZANIDINE 4 MILLIGRAM(S): 4 TABLET ORAL at 06:59

## 2022-07-02 RX ADMIN — ATORVASTATIN CALCIUM 40 MILLIGRAM(S): 80 TABLET, FILM COATED ORAL at 22:34

## 2022-07-02 RX ADMIN — Medication 250 MILLIGRAM(S): at 03:03

## 2022-07-02 RX ADMIN — TIZANIDINE 4 MILLIGRAM(S): 4 TABLET ORAL at 11:53

## 2022-07-02 RX ADMIN — GABAPENTIN 300 MILLIGRAM(S): 400 CAPSULE ORAL at 13:32

## 2022-07-02 RX ADMIN — Medication 1 APPLICATION(S): at 07:06

## 2022-07-02 RX ADMIN — BUPROPION HYDROCHLORIDE 150 MILLIGRAM(S): 150 TABLET, EXTENDED RELEASE ORAL at 11:54

## 2022-07-02 RX ADMIN — GABAPENTIN 300 MILLIGRAM(S): 400 CAPSULE ORAL at 22:35

## 2022-07-02 RX ADMIN — APIXABAN 2.5 MILLIGRAM(S): 2.5 TABLET, FILM COATED ORAL at 17:14

## 2022-07-02 RX ADMIN — Medication 81 MILLIGRAM(S): at 11:53

## 2022-07-02 RX ADMIN — TAMSULOSIN HYDROCHLORIDE 0.4 MILLIGRAM(S): 0.4 CAPSULE ORAL at 22:34

## 2022-07-02 RX ADMIN — CEFEPIME 100 MILLIGRAM(S): 1 INJECTION, POWDER, FOR SOLUTION INTRAMUSCULAR; INTRAVENOUS at 22:34

## 2022-07-02 RX ADMIN — Medication 120 MILLIGRAM(S): at 06:59

## 2022-07-02 RX ADMIN — Medication 20 MILLIGRAM(S): at 07:50

## 2022-07-02 RX ADMIN — CEFEPIME 100 MILLIGRAM(S): 1 INJECTION, POWDER, FOR SOLUTION INTRAMUSCULAR; INTRAVENOUS at 12:00

## 2022-07-02 RX ADMIN — APIXABAN 2.5 MILLIGRAM(S): 2.5 TABLET, FILM COATED ORAL at 06:58

## 2022-07-02 RX ADMIN — Medication 10 MILLIGRAM(S): at 11:53

## 2022-07-02 RX ADMIN — GABAPENTIN 300 MILLIGRAM(S): 400 CAPSULE ORAL at 07:06

## 2022-07-02 RX ADMIN — MUPIROCIN 1 APPLICATION(S): 20 OINTMENT TOPICAL at 17:14

## 2022-07-02 RX ADMIN — Medication 10 MILLIGRAM(S): at 22:35

## 2022-07-02 RX ADMIN — Medication 10 MILLIGRAM(S): at 06:59

## 2022-07-02 RX ADMIN — MUPIROCIN 1 APPLICATION(S): 20 OINTMENT TOPICAL at 07:07

## 2022-07-02 RX ADMIN — TIZANIDINE 4 MILLIGRAM(S): 4 TABLET ORAL at 22:35

## 2022-07-02 RX ADMIN — Medication 1 APPLICATION(S): at 17:20

## 2022-07-02 RX ADMIN — LATANOPROST 1 DROP(S): 0.05 SOLUTION/ DROPS OPHTHALMIC; TOPICAL at 22:34

## 2022-07-02 NOTE — PROGRESS NOTE ADULT - PROBLEM SELECTOR PLAN 3
- Chronic. RVR in the setting of fever and dehydration.   - EKG: Afib , right axis deviation, RV hypertrophy septal and inferior infarct age undeterm.   - s/p Tylenol and IVF 2 lt bolus with rate in 90's on auscultation and monitor.   - TTE 6/15/22: Mild LV hypertrophy, normal systolic function, eLVEF of 60%.Grossly normal RV.  LA enlargement. Trace to mild MR / Trace TR.  - Continue home Cardizem CD  120 qd with hold parameters.   - Continue home Eliquis 2.5 mg 2x day   - Monitor in tele: No events overnight, afib 70-80s - Chronic and stable   - Medication adjusted during the last admission   - Continue home Cardizem CD  120 with hold parameters.   - Losartan 25 qd restart

## 2022-07-02 NOTE — PROGRESS NOTE ADULT - PROBLEM SELECTOR PLAN 2
Resolved  - BUN 21/Cr 1.3 on admission.  Baseline Cr 0.9  - LATOYA 2/2 current sepsis, dehydration   - Cr 1.0 today  - s/p 2 lt NS bolus.  - Hold losartan for now   - Encourage fluid intake.   - Monitor and trend Cr. - Chronic. RVR in the setting of fever and dehydration. -RESOLVED  - TTE 6/15/22: Mild LV hypertrophy, normal systolic function, eLVEF of 60%.Grossly normal RV.  LA enlargement. Trace to mild MR / Trace TR.  - Continue home Cardizem CD  120 qd with hold parameters.   - Continue home Eliquis 2.5 mg 2x day   - Monitor in tele: No events overnight, afib 70-80s

## 2022-07-02 NOTE — PROGRESS NOTE ADULT - PROBLEM SELECTOR PLAN 1
- Pt recently DC s/p covid infection now with superimposed PNA -Bacterial infection,   - Meet sepsis criteria with fever, tachycardia, leukocytosis.   - Elevated lactate 2.6--> Normal , elevated Pro conner   - CT CAP: Mild-to-moderate patchy airspace disease or consolidation both lower lobes. Mild airspace disease and/or atelectasis at the lingula.  (It is a preliminary result)  - s/p Zosyn and vanco x1 dose, NS 2lt bolus ,   - Tylenol suppository, Zofran IV x1.   - Continue  vanco f& Start Cefepime as d/w ID DR ANA Bhardwaj  - procal 4.05, lactate wnl 7/2/22  - MRSA PCR +, mupirocin nasal ordered  - blood cultures x 2- NGTD 7/2/22.  - Incentive spirometry , Inh PRN - Pt recently DC s/p covid infection now with superimposed PNA -Bacterial infection,   - Meet sepsis criteria with fever, tachycardia, leukocytosis.  WBC Resolved   - CT CAP: Mild-to-moderate patchy airspace disease or consolidation both lower lobes. Mild airspace disease and/or atelectasis at the lingula.  (It is a preliminary result)  - Continue  Vanco & Start Cefepime as d/w ID DR ANA Bhardwaj  - procal 4.05, lactate wnl 7/2/22  - MRSA PCR +, mupirocin nasal ordered  - blood cultures x 2- NGTD 7/2/22.  - Incentive spirometry , Inh PRN

## 2022-07-02 NOTE — PROGRESS NOTE ADULT - SUBJECTIVE AND OBJECTIVE BOX
Patient is a 72y old  Male who presents with a chief complaint of sepsis, PNA (2022 21:20)    HPI:  Pt is a 72 y.o male with a PMH of HTN, AFIB, BPH, bedbound 2/2 TBI, HLD, CAD, peripheral neuropathy, glaucoma, who presented to the ED with increasing cough and sweating. History taken via phone from wife, pt was not proving information at the moment of the exam. Pt was recently admitted from  to  for Covid infection and Afib with RVR. Wife reports that today pt started to have sweating, not feeling good, increased cough, 1 episode of diarrhea and vomiting, and she decided to brought him in. She states that he denied SOB, and his mental status was at baseline.  She also reports that after the discharge pt was doing in general good with persistent mild cough. He had nurse evaluation at home 2 days ago and was doing good and yesterday was seen by neuro with no acute concerns.     ED course:   - Vitals: 144/93, , RR 16, temp 98.2 < 101.8, O2 sat 95% with NC 4lt   - Labs: WBC 27.28, N 93%. Hb 15.2. INR 1.37. Na 146.  BUN 21/Cr 1.3.  Glucose 162.  Lactate 2.6  - CT CAP: Mild-to-moderate patchy airspace disease or consolidation both lower lobes. Mild airspace disease and/or atelectasis at the lingula.  (It is a preliminary result)  - EKG: Afib , right axis deviation, RV hypertrophy septal and inferior infarct age undeterm.   - s/p Zosyn and vanco x1 dose, NS 2lt bolus, Tylenol suppository, Zofran IV x1.  (2022 00:35)    INTERVAL HPI:  22: pt seen, Examined, feels much better, on IV cefepime & Vanco, WBC elevated, seen by ID, No complaints  22: Pt seen, examined. feels the same as yesterday. Endorses a productive cough, but only complaint is that the food is not good. No complaints at this time. Continuing with IV cefepime & vanco.    OVERNIGHT EVENTS: No acute overnight events.     Home Medications:  aspirin 81 mg oral tablet: 1 tab(s) orally once a day (:13)  bethanechol 10 mg oral tablet: 1 tab(s) orally 3 times a day at (8am, 3pm, 10pm) (:13)  buPROPion 75 mg oral tablet: 1 tab(s) orally 2 times a day (10am, 10pm) (:13)  Eliquis 2.5 mg oral tablet: 1 tab(s) orally 2 times a day (10am, 10pm)t (:13)  gabapentin 300 mg oral capsule: 1 cap(s) orally 3 times a day (8am, 3pm, 10pm (:13)  latanoprost 0.005% ophthalmic solution: 1 drop(s) to each affected eye once a day (in the evening) ()  methylphenidate 10 mg oral tablet: 2 tab(s) orally once a day (in the morning) (:)  rosuvastatin 10 mg oral tablet: 1 tab(s) orally once a day ()  tamsulosin 0.4 mg oral capsule: 1 cap(s) orally once a day (in the evening) (:)  tiZANidine 4 mg oral tablet: 1 tab(s) orally 3 times a day (:)  Vitamin D3 25 mcg (1000 intl units) oral tablet: 1 tab(s) orally once a day (:)      MEDICATIONS  (STANDING):  ammonium lactate 12% Lotion 1 Application(s) Topical two times a day  apixaban 2.5 milliGRAM(s) Oral every 12 hours  aspirin  chewable 81 milliGRAM(s) Oral daily  atorvastatin 40 milliGRAM(s) Oral at bedtime  bethanechol 10 milliGRAM(s) Oral three times a day  buPROPion XL (24-Hour) . 150 milliGRAM(s) Oral daily  cefepime   IVPB 2000 milliGRAM(s) IV Intermittent every 12 hours  dextrose 5%. 1000 milliLiter(s) (50 mL/Hr) IV Continuous <Continuous>  diltiazem    milliGRAM(s) Oral daily  gabapentin 300 milliGRAM(s) Oral three times a day  latanoprost 0.005% Ophthalmic Solution 1 Drop(s) Both EYES at bedtime  methylphenidate 20 milliGRAM(s) Oral <User Schedule>  mupirocin 2% Nasal 1 Application(s) Both Nostrils two times a day  senna 2 Tablet(s) Oral at bedtime  tamsulosin 0.4 milliGRAM(s) Oral at bedtime  tiZANidine 4 milliGRAM(s) Oral three times a day  vancomycin  IVPB 1000 milliGRAM(s) IV Intermittent every 24 hours    MEDICATIONS  (PRN):  acetaminophen     Tablet .. 650 milliGRAM(s) Oral every 6 hours PRN Temp greater or equal to 38C (100.4F), Mild Pain (1 - 3)  benzonatate 100 milliGRAM(s) Oral three times a day PRN Cough      No Known Allergies      Social History:  Lives with wife.  Pt is bedbound and wheelchair bound. Pt is reliant on wife for ADLs.   No h/o alcohol, tobacco, or recreational drug use  COVID vaccinated x3, last in february with Moderna. (2022 00:35)      REVIEW OF SYSTEMS:  CONSTITUTIONAL: No fever, No chills, No fatigue, No myalgia, No Body ache, No Weakness  EYES: No eye pain,  No visual disturbances, No discharge, NO Redness  ENMT:  No ear pain, No nose bleed, No vertigo; No sinus pain, NO throat pain, No Congestion  NECK: No pain, No stiffness  RESPIRATORY: +cough, NO wheezing, No hemoptysis, NO  shortness of breath  CARDIOVASCULAR: No chest pain, palpitations  GASTROINTESTINAL: No abdominal pain, NO epigastric pain. No nausea, No vomiting; No diarrhea, No constipation. [  ] BM  GENITOURINARY: No dysuria, No frequency, No urgency, No hematuria, NO incontinence  NEUROLOGICAL: No headaches, No dizziness, No numbness, No tingling, No tremors, No weakness  EXT: No Swelling, + neuropathic Pain b/l feet, No Edema  SKIN:  [x  ] No itching, burning, rashes, or lesions   MUSCULOSKELETAL: No joint pain ,No Jt swelling; No muscle pain, No back pain, No extremity pain  PSYCHIATRIC: No depression,  No anxiety,  No mood swings ,No difficulty sleeping at night  PAIN SCALE: [ x ] None  [  ] Other-  ROS Unable to obtain due to - [  ] Dementia  [  ] Lethargy [  ] Drowsy [  ] Sedated [  ] non verbal  REST OF REVIEW Of SYSTEM - [ x ] Normal     Vital Signs Last 24 Hrs  T(C): 36.4 (2022 13:36), Max: 36.9 (2022 21:12)  T(F): 97.6 (2022 13:36), Max: 98.4 (2022 21:12)  HR: 67 (2022 13:36) (67 - 74)  BP: 118/78 (2022 13:36) (112/76 - 118/78)  BP(mean): --  RR: 21 (2022 13:36) (19 - 21)  SpO2: 95% (2022 13:36) (95% - 95%)    PHYSICAL EXAM:  GENERAL:  [x  ] NAD , [ x ] well appearing, [  ] Agitated, [  ] Drowsy,  [  ] Lethargy, [  ] confused   HEAD:  [ x ] Normal, [  ] Other  EYES:  [x  ] EOMI, [x  ] PERRLA, [x ] conjunctiva and sclera clear normal, [  ] Other,  [  ] Pallor,[  ] Discharge  ENMT:  [x  ] Normal, [ x ] Moist mucous membranes, [  ] Good dentition, [x  ] No Thrush  NECK:  [x  ] Supple, [x  ] No JVD, [  x] Normal thyroid, [  ] Lymphadenopathy [  ] Other  CHEST/LUNG:  [  ] Clear to auscultation bilaterally, [  x] Breath Sounds equal B/L / Decrease, [ x ] poor effort  [x  ] No rales, [x  ] b/l  rhonchi  [x  ]  No wheezing,   HEART:  [  ] Regular rate and rhythm, [  ] tachycardia, [  ] Bradycardia,  [ x ] irregular  [ x ] No murmurs, No rubs, No gallops, [  ] PPM in place (Mfr:  )  ABDOMEN:  [ x ] Soft, [x  ] Nontender, [ x ] Nondistended, [ x ] No mass, [x  ] Bowel sounds present, [  ] obese  NERVOUS SYSTEM:  [x  ] Alert & Oriented X3, [  ] Nonfocal  [  ] Confusion  [  ] Encephalopathic [  ] Sedated [  ] Unable to assess, [  ] Dementia [x  ] Other- B/L Lower Ext Contractures & Hand Contractures  EXTREMITIES: [ x ] 2+ Peripheral Pulses, No clubbing, No cyanosis,  [  ] edema B/L lower EXT. [  ] PVD stasis skin changes B/L Lower EXT, [  ] wound  LYMPH: No lymphadenopathy noted  SKIN:  [  ] No rashes or lesions, [  ] Pressure Ulcers, [  ] ecchymosis, [  ] Skin Tears, [x  ] Other-dry, scabs on legs       CAPILLARY BLOOD GLUCOSE    I&O's Summary    2022 07:01  -  2022 07:00  --------------------------------------------------------  IN: 950 mL / OUT: 750 mL / NET: 200 mL    2022 07:01  -  2022 15:22  --------------------------------------------------------  IN: 0 mL / OUT: 1200 mL / NET: -1200 mL      DIET: Diet, Regular (22 @ 16:25)      LABS:                        10.8   8.83  )-----------( 179      ( 2022 07:30 )             32.7     2022 07:30    142    |  110    |  14     ----------------------------<  132    3.6     |  26     |  1.00     Ca    8.3        2022 07:30    TPro  5.2    /  Alb  2.5    /  TBili  0.7    /  DBili  x      /  AST  9      /  ALT  13     /  AlkPhos  68     2022 07:30    PT/INR - ( 2022 22:20 )   PT: 16.1 sec;   INR: 1.37 ratio         PTT - ( 2022 22:20 )  PTT:34.4 sec  Urinalysis Basic - ( 2022 02:39 )    Color: Yellow / Appearance: Clear / S.020 / pH: x  Gluc: x / Ketone: Negative  / Bili: Negative / Urobili: Negative   Blood: x / Protein: 30 mg/dL / Nitrite: Negative   Leuk Esterase: Trace / RBC: 0-2 /HPF / WBC 3-5   Sq Epi: x / Non Sq Epi: Few / Bacteria: Few      Culture Results:   <10,000 CFU/mL Normal Urogenital Angel ( @ 05:41)  Culture Results:   No growth to date. ( @ 22:20)  Culture Results:   No growth to date. ( @ 22:15)    culture blood  -- Clean Catch Clean Catch (Midstream)  @ 05:41    culture urine  --   @ 05:41  culture blood  -- .Blood Blood-Peripheral  @ 22:20    culture urine  --   @ 22:20  culture blood  -- .Blood Blood-Peripheral  @ 22:15    culture urine  --   @ 22:15          Culture - Urine (collected 2022 05:41)  Source: Clean Catch Clean Catch (Midstream)  Final Report (2022 07:10):    <10,000 CFU/mL Normal Urogenital Angle    Culture - Blood (collected 2022 22:20)  Source: .Blood Blood-Peripheral  Preliminary Report (2022 06:00):    No growth to date.    Culture - Blood (collected 2022 22:15)  Source: .Blood Blood-Peripheral  Preliminary Report (2022 06:00):    No growth to date.       Anemia Panel:      Thyroid Panel:                RADIOLOGY & ADDITIONAL TESTS:          HEALTH ISSUES - PROBLEM Dx:  Sepsis due to pneumonia    Atrial fibrillation with RVR    Hypertension    Glaucoma    LATOYA (acute kidney injury)    Need for prophylactic measure    History of BPH    H/O traumatic brain injury    Chronic major depressive disorder    CAD (coronary artery disease)          Consultant(s) Notes Reviewed:  [ x ] YES     Care Discussed with [ x ] Consultants, [ x ] Patient, [ x ] Family, [  ] HCP, [ x ] , [  ] Social Service, [ x ] RN, [  ] Physical Therapy, [  ] Palliative Care Team  DVT PPX: [  ] Lovenox, [  ] SC Heparin, [  ] Coumadin, [  ] Xarelto, [  ] Eliquis, [  ] Pradaxa, [  ] IV Heparin drip, [  ] SCD, [  ] Ambulation, [  ] Contraindicated 2/2 GI Bleed, [  ] Contraindicated 2/2  Bleed, [  ] Contraindicated 2/2 Brain Bleed  Advanced Directive: [  ] None, [  ] DNR/DNI Patient is a 72y old  Male who presents with a chief complaint of sepsis, PNA (2022 21:20)    HPI:  Pt is a 72 y.o male with a PMH of HTN, AFIB, BPH, bedbound 2/2 TBI, HLD, CAD, peripheral neuropathy, glaucoma, who presented to the ED with increasing cough and sweating. History taken via phone from wife, pt was not proving information at the moment of the exam. Pt was recently admitted from  to  for Covid infection and Afib with RVR. Wife reports that today pt started to have sweating, not feeling good, increased cough, 1 episode of diarrhea and vomiting, and she decided to brought him in. She states that he denied SOB, and his mental status was at baseline.  She also reports that after the discharge pt was doing in general good with persistent mild cough. He had nurse evaluation at home 2 days ago and was doing good and yesterday was seen by neuro with no acute concerns.     ED course:   - Vitals: 144/93, , RR 16, temp 98.2 < 101.8, O2 sat 95% with NC 4lt   - Labs: WBC 27.28, N 93%. Hb 15.2. INR 1.37. Na 146.  BUN 21/Cr 1.3.  Glucose 162.  Lactate 2.6  - CT CAP: Mild-to-moderate patchy airspace disease or consolidation both lower lobes. Mild airspace disease and/or atelectasis at the lingula.  (It is a preliminary result)  - EKG: Afib , right axis deviation, RV hypertrophy septal and inferior infarct age undeterm.   - s/p Zosyn and vanco x1 dose, NS 2lt bolus, Tylenol suppository, Zofran IV x1.  (2022 00:35)    INTERVAL HPI:  22: pt seen, Examined, feels much better, on IV cefepime & Vanco, WBC elevated, seen by ID, No complaints  22: Pt seen, examined. feels the same as yesterday. Endorses a productive cough, but only complaint is that the food is not good. No complaints at this time. Continuing with IV cefepime & vanco.    OVERNIGHT EVENTS: No acute overnight events.     Home Medications:  aspirin 81 mg oral tablet: 1 tab(s) orally once a day (:13)  bethanechol 10 mg oral tablet: 1 tab(s) orally 3 times a day at (8am, 3pm, 10pm) (:13)  buPROPion 75 mg oral tablet: 1 tab(s) orally 2 times a day (10am, 10pm) (:13)  Eliquis 2.5 mg oral tablet: 1 tab(s) orally 2 times a day (10am, 10pm)t (:13)  gabapentin 300 mg oral capsule: 1 cap(s) orally 3 times a day (8am, 3pm, 10pm (:13)  latanoprost 0.005% ophthalmic solution: 1 drop(s) to each affected eye once a day (in the evening) ()  methylphenidate 10 mg oral tablet: 2 tab(s) orally once a day (in the morning) (:)  rosuvastatin 10 mg oral tablet: 1 tab(s) orally once a day ()  tamsulosin 0.4 mg oral capsule: 1 cap(s) orally once a day (in the evening) (:)  tiZANidine 4 mg oral tablet: 1 tab(s) orally 3 times a day (:)  Vitamin D3 25 mcg (1000 intl units) oral tablet: 1 tab(s) orally once a day (:)      MEDICATIONS  (STANDING):  ammonium lactate 12% Lotion 1 Application(s) Topical two times a day  apixaban 2.5 milliGRAM(s) Oral every 12 hours  aspirin  chewable 81 milliGRAM(s) Oral daily  atorvastatin 40 milliGRAM(s) Oral at bedtime  bethanechol 10 milliGRAM(s) Oral three times a day  buPROPion XL (24-Hour) . 150 milliGRAM(s) Oral daily  cefepime   IVPB 2000 milliGRAM(s) IV Intermittent every 12 hours  dextrose 5%. 1000 milliLiter(s) (50 mL/Hr) IV Continuous <Continuous>  diltiazem    milliGRAM(s) Oral daily  gabapentin 300 milliGRAM(s) Oral three times a day  latanoprost 0.005% Ophthalmic Solution 1 Drop(s) Both EYES at bedtime  methylphenidate 20 milliGRAM(s) Oral <User Schedule>  mupirocin 2% Nasal 1 Application(s) Both Nostrils two times a day  senna 2 Tablet(s) Oral at bedtime  tamsulosin 0.4 milliGRAM(s) Oral at bedtime  tiZANidine 4 milliGRAM(s) Oral three times a day  vancomycin  IVPB 1000 milliGRAM(s) IV Intermittent every 24 hours    MEDICATIONS  (PRN):  acetaminophen     Tablet .. 650 milliGRAM(s) Oral every 6 hours PRN Temp greater or equal to 38C (100.4F), Mild Pain (1 - 3)  benzonatate 100 milliGRAM(s) Oral three times a day PRN Cough      No Known Allergies      Social History:  Lives with wife.  Pt is bedbound and wheelchair bound. Pt is reliant on wife for ADLs.   No h/o alcohol, tobacco, or recreational drug use  COVID vaccinated x3, last in february with Moderna. (2022 00:35)      REVIEW OF SYSTEMS:  CONSTITUTIONAL: No fever, No chills, No fatigue, No myalgia, No Body ache, No Weakness  EYES: No eye pain,  No visual disturbances, No discharge, NO Redness  ENMT:  No ear pain, No nose bleed, No vertigo; No sinus pain, NO throat pain, No Congestion  NECK: No pain, No stiffness  RESPIRATORY: +cough, NO wheezing, No hemoptysis, NO  shortness of breath  CARDIOVASCULAR: No chest pain, palpitations  GASTROINTESTINAL: No abdominal pain, NO epigastric pain. No nausea, No vomiting; No diarrhea, No constipation. [  ] BM  GENITOURINARY: No dysuria, No frequency, No urgency, No hematuria, NO incontinence  NEUROLOGICAL: No headaches, No dizziness, No numbness, No tingling, No tremors, No weakness  EXT: No Swelling, + neuropathic Pain b/l feet, No Edema  SKIN:  [x  ] No itching, burning, rashes, or lesions   MUSCULOSKELETAL: No joint pain ,No Jt swelling; No muscle pain, No back pain, No extremity pain  PSYCHIATRIC: No depression,  No anxiety,  No mood swings ,No difficulty sleeping at night  PAIN SCALE: [ x ] None  [  ] Other-  ROS Unable to obtain due to - [  ] Dementia  [  ] Lethargy [  ] Drowsy [  ] Sedated [  ] non verbal  REST OF REVIEW Of SYSTEM - [ x ] Normal     Vital Signs Last 24 Hrs  T(C): 36.4 (2022 13:36), Max: 36.9 (2022 21:12)  T(F): 97.6 (2022 13:36), Max: 98.4 (2022 21:12)  HR: 67 (2022 13:36) (67 - 74)  BP: 118/78 (2022 13:36) (112/76 - 118/78)  BP(mean): --  RR: 21 (2022 13:36) (19 - 21)  SpO2: 95% (2022 13:36) (95% - 95%)    PHYSICAL EXAM:  GENERAL:  [x  ] NAD , [ x ] well appearing, [  ] Agitated, [  ] Drowsy,  [  ] Lethargy, [  ] confused   HEAD:  [ x ] Normal, [  ] Other  EYES:  [x  ] EOMI, [x  ] PERRLA, [x ] conjunctiva and sclera clear normal, [  ] Other,  [  ] Pallor,[  ] Discharge  ENMT:  [x  ] Normal, [ x ] Moist mucous membranes, [  ] Good dentition, [x  ] No Thrush  NECK:  [x  ] Supple, [x  ] No JVD, [  x] Normal thyroid, [  ] Lymphadenopathy [  ] Other  CHEST/LUNG:  [  ] Clear to auscultation bilaterally, [  x] Breath Sounds equal B/L / Decrease, [ x ] poor effort  [x  ] No rales, [x  ] b/l  rhonchi  [x  ]  No wheezing,   HEART:  [  ] Regular rate and rhythm, [  ] tachycardia, [  ] Bradycardia,  [ x ] irregular  [ x ] No murmurs, No rubs, No gallops, [  ] PPM in place (Mfr:  )  ABDOMEN:  [ x ] Soft, [x  ] Nontender, [ x ] Nondistended, [ x ] No mass, [x  ] Bowel sounds present, [  ] obese  NERVOUS SYSTEM:  [x  ] Alert & Oriented X3, [  ] Nonfocal  [  ] Confusion  [  ] Encephalopathic [  ] Sedated [  ] Unable to assess, [  ] Dementia [x  ] Other- B/L Lower Ext Contractures & Hand Contractures  EXTREMITIES: [ x ] 2+ Peripheral Pulses, No clubbing, No cyanosis,  [  ] edema B/L lower EXT. [  ] PVD stasis skin changes B/L Lower EXT, [  ] wound  LYMPH: No lymphadenopathy noted  SKIN:  [  ] No rashes or lesions, [  ] Pressure Ulcers, [  ] ecchymosis, [  ] Skin Tears, [x  ] Other-dry, scabs on legs       CAPILLARY BLOOD GLUCOSE    I&O's Summary    2022 07:01  -  2022 07:00  --------------------------------------------------------  IN: 950 mL / OUT: 750 mL / NET: 200 mL    2022 07:01  -  2022 15:22  --------------------------------------------------------  IN: 0 mL / OUT: 1200 mL / NET: -1200 mL      DIET: Diet, Regular (22 @ 16:25)      LABS:                        10.8   8.83  )-----------( 179      ( 2022 07:30 )             32.7     2022 07:30    142    |  110    |  14     ----------------------------<  132    3.6     |  26     |  1.00     Ca    8.3        2022 07:30    TPro  5.2    /  Alb  2.5    /  TBili  0.7    /  DBili  x      /  AST  9      /  ALT  13     /  AlkPhos  68     2022 07:30    PT/INR - ( 2022 22:20 )   PT: 16.1 sec;   INR: 1.37 ratio         PTT - ( 2022 22:20 )  PTT:34.4 sec  Urinalysis Basic - ( 2022 02:39 )    Color: Yellow / Appearance: Clear / S.020 / pH: x  Gluc: x / Ketone: Negative  / Bili: Negative / Urobili: Negative   Blood: x / Protein: 30 mg/dL / Nitrite: Negative   Leuk Esterase: Trace / RBC: 0-2 /HPF / WBC 3-5   Sq Epi: x / Non Sq Epi: Few / Bacteria: Few      Culture Results:   <10,000 CFU/mL Normal Urogenital Angle ( @ 05:41)  Culture Results:   No growth to date. ( @ 22:20)  Culture Results:   No growth to date. ( @ 22:15)    culture blood  -- Clean Catch Clean Catch (Midstream)  @ 05:41    culture urine  --   @ 05:41  culture blood  -- .Blood Blood-Peripheral  @ 22:20    culture urine  --   @ 22:20  culture blood  -- .Blood Blood-Peripheral  @ 22:15    culture urine  --   @ 22:15          Culture - Urine (collected 2022 05:41)  Source: Clean Catch Clean Catch (Midstream)  Final Report (2022 07:10):    <10,000 CFU/mL Normal Urogenital Angle    Culture - Blood (collected 2022 22:20)  Source: .Blood Blood-Peripheral  Preliminary Report (2022 06:00):    No growth to date.    Culture - Blood (collected 2022 22:15)  Source: .Blood Blood-Peripheral  Preliminary Report (2022 06:00):    No growth to date.       Anemia Panel:      Thyroid Panel:                RADIOLOGY & ADDITIONAL TESTS: N/A      HEALTH ISSUES - PROBLEM Dx:  Sepsis due to pneumonia    Atrial fibrillation with RVR    Hypertension    Glaucoma    LATOYA (acute kidney injury)    Need for prophylactic measure    History of BPH    H/O traumatic brain injury    Chronic major depressive disorder    CAD (coronary artery disease)          Consultant(s) Notes Reviewed:  [ x ] YES     Care Discussed with [ x ] Consultants, [ x ] Patient, [ x ] Family, [  ] HCP, [ x ] , [  ] Social Service, [ x ] RN, [  ] Physical Therapy, [  ] Palliative Care Team  DVT PPX: [  ] Lovenox, [  ] SC Heparin, [  ] Coumadin, [  ] Xarelto, [X] Eliquis, [  ] Pradaxa, [  ] IV Heparin drip, [  ] SCD, [  ] Ambulation, [  ] Contraindicated 2/2 GI Bleed, [  ] Contraindicated 2/2  Bleed, [  ] Contraindicated 2/2 Brain Bleed  Advanced Directive: [X] None, [  ] DNR/DNI Patient is a 72y old  Male who presents with a chief complaint of sepsis, PNA (2022 21:20)    HPI:  Pt is a 72 y.o male with a PMH of HTN, AFIB, BPH, bedbound 2/2 TBI, HLD, CAD, peripheral neuropathy, glaucoma, who presented to the ED with increasing cough and sweating. History taken via phone from wife, pt was not proving information at the moment of the exam. Pt was recently admitted from  to  for Covid infection and Afib with RVR. Wife reports that today pt started to have sweating, not feeling good, increased cough, 1 episode of diarrhea and vomiting, and she decided to brought him in. She states that he denied SOB, and his mental status was at baseline.  She also reports that after the discharge pt was doing in general good with persistent mild cough. He had nurse evaluation at home 2 days ago and was doing good and yesterday was seen by neuro with no acute concerns.     ED course:   - Vitals: 144/93, , RR 16, temp 98.2 < 101.8, O2 sat 95% with NC 4lt   - Labs: WBC 27.28, N 93%. Hb 15.2. INR 1.37. Na 146.  BUN 21/Cr 1.3.  Glucose 162.  Lactate 2.6  - CT CAP: Mild-to-moderate patchy airspace disease or consolidation both lower lobes. Mild airspace disease and/or atelectasis at the lingula.  (It is a preliminary result)  - EKG: Afib , right axis deviation, RV hypertrophy septal and inferior infarct age undeterm.   - s/p Zosyn and vanco x1 dose, NS 2lt bolus, Tylenol suppository, Zofran IV x1.  (2022 00:35)    INTERVAL HPI:  22: pt seen, Examined, feels much better, on IV cefepime & Vanco, WBC elevated, seen by ID, No complaints  22: Pt seen, examined. feels the same as yesterday. Endorses a productive cough, but only complaint is that the food is not good. No complaints at this time. Continuing with IV cefepime & vanco., WBC -Nl , Mild Anemia ,    OVERNIGHT EVENTS: No acute overnight events.     Home Medications:  aspirin 81 mg oral tablet: 1 tab(s) orally once a day (:13)  bethanechol 10 mg oral tablet: 1 tab(s) orally 3 times a day at (8am, 3pm, 10pm) (:13)  buPROPion 75 mg oral tablet: 1 tab(s) orally 2 times a day (10am, 10pm) (:13)  Eliquis 2.5 mg oral tablet: 1 tab(s) orally 2 times a day (10am, 10pm)t (:13)  gabapentin 300 mg oral capsule: 1 cap(s) orally 3 times a day (8am, 3pm, 10pm (:)  latanoprost 0.005% ophthalmic solution: 1 drop(s) to each affected eye once a day (in the evening) (:)  methylphenidate 10 mg oral tablet: 2 tab(s) orally once a day (in the morning) ()  rosuvastatin 10 mg oral tablet: 1 tab(s) orally once a day ()  tamsulosin 0.4 mg oral capsule: 1 cap(s) orally once a day (in the evening) (:)  tiZANidine 4 mg oral tablet: 1 tab(s) orally 3 times a day ()  Vitamin D3 25 mcg (1000 intl units) oral tablet: 1 tab(s) orally once a day (:13)      MEDICATIONS  (STANDING):  ammonium lactate 12% Lotion 1 Application(s) Topical two times a day  apixaban 2.5 milliGRAM(s) Oral every 12 hours  aspirin  chewable 81 milliGRAM(s) Oral daily  atorvastatin 40 milliGRAM(s) Oral at bedtime  bethanechol 10 milliGRAM(s) Oral three times a day  buPROPion XL (24-Hour) . 150 milliGRAM(s) Oral daily  cefepime   IVPB 2000 milliGRAM(s) IV Intermittent every 12 hours  dextrose 5%. 1000 milliLiter(s) (50 mL/Hr) IV Continuous <Continuous>  diltiazem    milliGRAM(s) Oral daily  gabapentin 300 milliGRAM(s) Oral three times a day  latanoprost 0.005% Ophthalmic Solution 1 Drop(s) Both EYES at bedtime  methylphenidate 20 milliGRAM(s) Oral <User Schedule>  mupirocin 2% Nasal 1 Application(s) Both Nostrils two times a day  senna 2 Tablet(s) Oral at bedtime  tamsulosin 0.4 milliGRAM(s) Oral at bedtime  tiZANidine 4 milliGRAM(s) Oral three times a day  vancomycin  IVPB 1000 milliGRAM(s) IV Intermittent every 24 hours    MEDICATIONS  (PRN):  acetaminophen     Tablet .. 650 milliGRAM(s) Oral every 6 hours PRN Temp greater or equal to 38C (100.4F), Mild Pain (1 - 3)  benzonatate 100 milliGRAM(s) Oral three times a day PRN Cough      No Known Allergies      Social History:  Lives with wife.  Pt is bedbound and wheelchair bound. Pt is reliant on wife for ADLs.   No h/o alcohol, tobacco, or recreational drug use  COVID vaccinated x3, last in february with Moderna. (2022 00:35)      REVIEW OF SYSTEMS: i am OK   CONSTITUTIONAL: No fever, No chills, No fatigue, No myalgia, No Body ache, No Weakness  EYES: No eye pain,  No visual disturbances, No discharge, NO Redness  ENMT:  No ear pain, No nose bleed, No vertigo; No sinus pain, NO throat pain, No Congestion  NECK: No pain, No stiffness  RESPIRATORY: +cough, NO wheezing, No hemoptysis, NO  shortness of breath  CARDIOVASCULAR: No chest pain, palpitations  GASTROINTESTINAL: No abdominal pain, NO epigastric pain. No nausea, No vomiting; No diarrhea, No constipation. [  ] BM  GENITOURINARY: No dysuria, No frequency, No urgency, No hematuria, NO incontinence  NEUROLOGICAL: No headaches, No dizziness, No numbness, No tingling, No tremors, No weakness  EXT: No Swelling, + neuropathic Pain b/l feet, No Edema  SKIN:  [x  ] No itching, burning, rashes, or lesions   MUSCULOSKELETAL: No joint pain ,No Jt swelling; No muscle pain, No back pain, No extremity pain  PSYCHIATRIC: No depression,  No anxiety,  No mood swings ,No difficulty sleeping at night  PAIN SCALE: [ x ] None  [  ] Other-  ROS Unable to obtain due to - [  ] Dementia  [  ] Lethargy [  ] Drowsy [  ] Sedated [  ] non verbal  REST OF REVIEW Of SYSTEM - [ x ] Normal     Vital Signs Last 24 Hrs  T(C): 36.4 (2022 13:36), Max: 36.9 (2022 21:12)  T(F): 97.6 (2022 13:36), Max: 98.4 (2022 21:12)  HR: 67 (2022 13:36) (67 - 74)  BP: 118/78 (2022 13:36) (112/76 - 118/78)  BP(mean): --  RR: 21 (2022 13:36) (19 - 21)  SpO2: 95% (2022 13:36) (95% - 95%)    PHYSICAL EXAM:  GENERAL:  [x  ] NAD , [ x ] well appearing, [  ] Agitated, [  ] Drowsy,  [  ] Lethargy, [  ] confused   HEAD:  [ x ] Normal, [  ] Other  EYES:  [x  ] EOMI, [x  ] PERRLA, [x ] conjunctiva and sclera clear normal, [  ] Other,  [  ] Pallor,[  ] Discharge  ENMT:  [x  ] Normal, [ x ] Moist mucous membranes, [  ] Good dentition, [x  ] No Thrush  NECK:  [x  ] Supple, [x  ] No JVD, [  x] Normal thyroid, [  ] Lymphadenopathy [  ] Other  CHEST/LUNG:  [  ] Clear to auscultation bilaterally, [  x] Breath Sounds equal B/L / Decrease, [ x ] poor effort  [x  ] No rales, [x  ] b/l  rhonchi  [x  ]  No wheezing,   HEART:  [  ] Regular rate and rhythm, [  ] tachycardia, [  ] Bradycardia,  [ x ] irregular  [ x ] No murmurs, No rubs, No gallops, [  ] PPM in place (Mfr:  )  ABDOMEN:  [ x ] Soft, [x  ] Nontender, [ x ] Nondistended, [ x ] No mass, [x  ] Bowel sounds present, [  ] obese  NERVOUS SYSTEM:  [x  ] Alert & Oriented X3, [  ] Nonfocal  [  ] Confusion  [  ] Encephalopathic [  ] Sedated [  ] Unable to assess, [  ] Dementia [x  ] Other- B/L Lower Ext Contractures & Hand Contractures  EXTREMITIES: [ x ] 2+ Peripheral Pulses, No clubbing, No cyanosis,  [ x ] 2 + pitting edema B/L lower EXT. [  ] PVD stasis skin changes B/L Lower EXT, [  ] wound  LYMPH: No lymphadenopathy noted  SKIN:  [  ] No rashes or lesions, [  ] Pressure Ulcers, [  ] ecchymosis, [  ] Skin Tears, [x  ] Other-dry, scabs on legs       CAPILLARY BLOOD GLUCOSE    I&O's Summary    2022 07:01  -  2022 07:00  --------------------------------------------------------  IN: 950 mL / OUT: 750 mL / NET: 200 mL    2022 07:01  -  2022 15:22  --------------------------------------------------------  IN: 0 mL / OUT: 1200 mL / NET: -1200 mL      DIET: Diet, Regular (22 @ 16:25)      LABS:                        10.8   8.83  )-----------( 179      ( 2022 07:30 )             32.7     2022 07:30    142    |  110    |  14     ----------------------------<  132    3.6     |  26     |  1.00     Ca    8.3        2022 07:30    TPro  5.2    /  Alb  2.5    /  TBili  0.7    /  DBili  x      /  AST  9      /  ALT  13     /  AlkPhos  68     2022 07:30    PT/INR - ( 2022 22:20 )   PT: 16.1 sec;   INR: 1.37 ratio         PTT - ( 2022 22:20 )  PTT:34.4 sec  Urinalysis Basic - ( 2022 02:39 )    Color: Yellow / Appearance: Clear / S.020 / pH: x  Gluc: x / Ketone: Negative  / Bili: Negative / Urobili: Negative   Blood: x / Protein: 30 mg/dL / Nitrite: Negative   Leuk Esterase: Trace / RBC: 0-2 /HPF / WBC 3-5   Sq Epi: x / Non Sq Epi: Few / Bacteria: Few      Culture Results:   <10,000 CFU/mL Normal Urogenital Angle ( @ 05:41)  Culture Results:   No growth to date. ( @ 22:20)  Culture Results:   No growth to date. ( @ 22:15)    culture blood  -- Clean Catch Clean Catch (Midstream)  @ 05:41    culture urine  --   @ 05:41  culture blood  -- .Blood Blood-Peripheral  @ 22:20    culture urine  --   @ 22:20  culture blood  -- .Blood Blood-Peripheral  @ 22:15    culture urine  --   @ 22:15    Culture - Urine (collected 2022 05:41)  Source: Clean Catch Clean Catch (Midstream)  Final Report (2022 07:10):    <10,000 CFU/mL Normal Urogenital Angle    Culture - Blood (collected 2022 22:20)  Source: .Blood Blood-Peripheral  Preliminary Report (2022 06:00):    No growth to date.    Culture - Blood (collected 2022 22:15)  Source: .Blood Blood-Peripheral  Preliminary Report (2022 06:00):    No growth to date.      RADIOLOGY & ADDITIONAL TESTS: N/A      HEALTH ISSUES - PROBLEM Dx:  Sepsis due to pneumonia    Atrial fibrillation with RVR    Hypertension    Glaucoma    LATOYA (acute kidney injury)    Need for prophylactic measure    History of BPH    H/O traumatic brain injury    Chronic major depressive disorder    CAD (coronary artery disease)      Consultant(s) Notes Reviewed:  [ x ] YES     Care Discussed with [ x ] Consultants, [ x ] Patient, [ x ] Family, [  ] HCP, [ x ] , [  ] Social Service, [ x ] RN, [  ] Physical Therapy, [  ] Palliative Care Team  DVT PPX: [  ] Lovenox, [  ] SC Heparin, [  ] Coumadin, [  ] Xarelto, [X] Eliquis, [  ] Pradaxa, [  ] IV Heparin drip, [  ] SCD, [  ] Ambulation, [  ] Contraindicated 2/2 GI Bleed, [  ] Contraindicated 2/2  Bleed, [  ] Contraindicated 2/2 Brain Bleed  Advanced Directive: [X] None, [  ] DNR/DNI

## 2022-07-02 NOTE — PROGRESS NOTE ADULT - PROBLEM SELECTOR PLAN 6
- chronic on bupropion, Continue Resolved  - BUN 21/Cr 1.3 on admission.  Baseline Cr 0.9  - LATOYA 2/2 current sepsis, dehydration   - Cr 1.0 today  - s/p 2 lt NS bolus.  - Restart  losartan for now   - Encourage fluid intake.   - Monitor and trend Cr.

## 2022-07-02 NOTE — PROGRESS NOTE ADULT - PROBLEM SELECTOR PLAN 7
- Chronic and stable   - Medication adjusted during the last admission   - Continue home Cardizem CD  120 with hold parameters.   - Losartan 25 qd  HOLD  in the setting or LATOYA  - Monitor BP - chronic on bupropion, Continue

## 2022-07-03 LAB
ALBUMIN SERPL ELPH-MCNC: 2.9 G/DL — LOW (ref 3.3–5)
ALP SERPL-CCNC: 78 U/L — SIGNIFICANT CHANGE UP (ref 40–120)
ALT FLD-CCNC: 24 U/L — SIGNIFICANT CHANGE UP (ref 12–78)
ANION GAP SERPL CALC-SCNC: 6 MMOL/L — SIGNIFICANT CHANGE UP (ref 5–17)
AST SERPL-CCNC: 14 U/L — LOW (ref 15–37)
BASOPHILS # BLD AUTO: 0.06 K/UL — SIGNIFICANT CHANGE UP (ref 0–0.2)
BASOPHILS NFR BLD AUTO: 0.6 % — SIGNIFICANT CHANGE UP (ref 0–2)
BILIRUB SERPL-MCNC: 0.7 MG/DL — SIGNIFICANT CHANGE UP (ref 0.2–1.2)
BUN SERPL-MCNC: 15 MG/DL — SIGNIFICANT CHANGE UP (ref 7–23)
CALCIUM SERPL-MCNC: 8.6 MG/DL — SIGNIFICANT CHANGE UP (ref 8.5–10.1)
CHLORIDE SERPL-SCNC: 107 MMOL/L — SIGNIFICANT CHANGE UP (ref 96–108)
CO2 SERPL-SCNC: 30 MMOL/L — SIGNIFICANT CHANGE UP (ref 22–31)
CREAT SERPL-MCNC: 1 MG/DL — SIGNIFICANT CHANGE UP (ref 0.5–1.3)
EGFR: 81 ML/MIN/1.73M2 — SIGNIFICANT CHANGE UP
EOSINOPHIL # BLD AUTO: 0.45 K/UL — SIGNIFICANT CHANGE UP (ref 0–0.5)
EOSINOPHIL NFR BLD AUTO: 4.4 % — SIGNIFICANT CHANGE UP (ref 0–6)
GLUCOSE SERPL-MCNC: 116 MG/DL — HIGH (ref 70–99)
HCT VFR BLD CALC: 36.3 % — LOW (ref 39–50)
HGB BLD-MCNC: 11.8 G/DL — LOW (ref 13–17)
IMM GRANULOCYTES NFR BLD AUTO: 0.5 % — SIGNIFICANT CHANGE UP (ref 0–1.5)
LYMPHOCYTES # BLD AUTO: 1.02 K/UL — SIGNIFICANT CHANGE UP (ref 1–3.3)
LYMPHOCYTES # BLD AUTO: 10 % — LOW (ref 13–44)
MCHC RBC-ENTMCNC: 26 PG — LOW (ref 27–34)
MCHC RBC-ENTMCNC: 32.5 GM/DL — SIGNIFICANT CHANGE UP (ref 32–36)
MCV RBC AUTO: 80.1 FL — SIGNIFICANT CHANGE UP (ref 80–100)
MONOCYTES # BLD AUTO: 0.82 K/UL — SIGNIFICANT CHANGE UP (ref 0–0.9)
MONOCYTES NFR BLD AUTO: 8.1 % — SIGNIFICANT CHANGE UP (ref 2–14)
NEUTROPHILS # BLD AUTO: 7.76 K/UL — HIGH (ref 1.8–7.4)
NEUTROPHILS NFR BLD AUTO: 76.4 % — SIGNIFICANT CHANGE UP (ref 43–77)
NRBC # BLD: 0 /100 WBCS — SIGNIFICANT CHANGE UP (ref 0–0)
PLATELET # BLD AUTO: 188 K/UL — SIGNIFICANT CHANGE UP (ref 150–400)
POTASSIUM SERPL-MCNC: 3.4 MMOL/L — LOW (ref 3.5–5.3)
POTASSIUM SERPL-SCNC: 3.4 MMOL/L — LOW (ref 3.5–5.3)
PROT SERPL-MCNC: 5.9 G/DL — LOW (ref 6–8.3)
RBC # BLD: 4.53 M/UL — SIGNIFICANT CHANGE UP (ref 4.2–5.8)
RBC # FLD: 14.5 % — SIGNIFICANT CHANGE UP (ref 10.3–14.5)
SODIUM SERPL-SCNC: 143 MMOL/L — SIGNIFICANT CHANGE UP (ref 135–145)
WBC # BLD: 10.16 K/UL — SIGNIFICANT CHANGE UP (ref 3.8–10.5)
WBC # FLD AUTO: 10.16 K/UL — SIGNIFICANT CHANGE UP (ref 3.8–10.5)

## 2022-07-03 RX ORDER — LOSARTAN POTASSIUM 100 MG/1
25 TABLET, FILM COATED ORAL DAILY
Refills: 0 | Status: DISCONTINUED | OUTPATIENT
Start: 2022-07-03 | End: 2022-07-06

## 2022-07-03 RX ORDER — SODIUM CHLORIDE 0.65 %
1 AEROSOL, SPRAY (ML) NASAL
Refills: 0 | Status: DISCONTINUED | OUTPATIENT
Start: 2022-07-03 | End: 2022-07-06

## 2022-07-03 RX ORDER — FUROSEMIDE 40 MG
20 TABLET ORAL ONCE
Refills: 0 | Status: COMPLETED | OUTPATIENT
Start: 2022-07-03 | End: 2022-07-03

## 2022-07-03 RX ORDER — ALBUTEROL 90 UG/1
1 AEROSOL, METERED ORAL THREE TIMES A DAY
Refills: 0 | Status: DISCONTINUED | OUTPATIENT
Start: 2022-07-03 | End: 2022-07-06

## 2022-07-03 RX ORDER — POTASSIUM CHLORIDE 20 MEQ
40 PACKET (EA) ORAL ONCE
Refills: 0 | Status: COMPLETED | OUTPATIENT
Start: 2022-07-03 | End: 2022-07-03

## 2022-07-03 RX ADMIN — TIZANIDINE 4 MILLIGRAM(S): 4 TABLET ORAL at 06:56

## 2022-07-03 RX ADMIN — Medication 250 MILLIGRAM(S): at 01:56

## 2022-07-03 RX ADMIN — ALBUTEROL 1 PUFF(S): 90 AEROSOL, METERED ORAL at 06:58

## 2022-07-03 RX ADMIN — MUPIROCIN 1 APPLICATION(S): 20 OINTMENT TOPICAL at 17:13

## 2022-07-03 RX ADMIN — Medication 20 MILLIGRAM(S): at 14:03

## 2022-07-03 RX ADMIN — ATORVASTATIN CALCIUM 40 MILLIGRAM(S): 80 TABLET, FILM COATED ORAL at 21:20

## 2022-07-03 RX ADMIN — Medication 40 MILLIEQUIVALENT(S): at 11:55

## 2022-07-03 RX ADMIN — MUPIROCIN 1 APPLICATION(S): 20 OINTMENT TOPICAL at 06:57

## 2022-07-03 RX ADMIN — Medication 120 MILLIGRAM(S): at 06:55

## 2022-07-03 RX ADMIN — TIZANIDINE 4 MILLIGRAM(S): 4 TABLET ORAL at 21:20

## 2022-07-03 RX ADMIN — TAMSULOSIN HYDROCHLORIDE 0.4 MILLIGRAM(S): 0.4 CAPSULE ORAL at 21:21

## 2022-07-03 RX ADMIN — CEFEPIME 100 MILLIGRAM(S): 1 INJECTION, POWDER, FOR SOLUTION INTRAMUSCULAR; INTRAVENOUS at 21:21

## 2022-07-03 RX ADMIN — TIZANIDINE 4 MILLIGRAM(S): 4 TABLET ORAL at 11:58

## 2022-07-03 RX ADMIN — ALBUTEROL 1 PUFF(S): 90 AEROSOL, METERED ORAL at 21:22

## 2022-07-03 RX ADMIN — LOSARTAN POTASSIUM 25 MILLIGRAM(S): 100 TABLET, FILM COATED ORAL at 06:58

## 2022-07-03 RX ADMIN — Medication 1 APPLICATION(S): at 06:56

## 2022-07-03 RX ADMIN — Medication 10 MILLIGRAM(S): at 21:20

## 2022-07-03 RX ADMIN — Medication 10 MILLIGRAM(S): at 11:56

## 2022-07-03 RX ADMIN — SENNA PLUS 2 TABLET(S): 8.6 TABLET ORAL at 21:20

## 2022-07-03 RX ADMIN — Medication 81 MILLIGRAM(S): at 12:01

## 2022-07-03 RX ADMIN — CEFEPIME 100 MILLIGRAM(S): 1 INJECTION, POWDER, FOR SOLUTION INTRAMUSCULAR; INTRAVENOUS at 11:59

## 2022-07-03 RX ADMIN — GABAPENTIN 300 MILLIGRAM(S): 400 CAPSULE ORAL at 06:56

## 2022-07-03 RX ADMIN — APIXABAN 2.5 MILLIGRAM(S): 2.5 TABLET, FILM COATED ORAL at 06:56

## 2022-07-03 RX ADMIN — LATANOPROST 1 DROP(S): 0.05 SOLUTION/ DROPS OPHTHALMIC; TOPICAL at 21:19

## 2022-07-03 RX ADMIN — GABAPENTIN 300 MILLIGRAM(S): 400 CAPSULE ORAL at 13:30

## 2022-07-03 RX ADMIN — GABAPENTIN 300 MILLIGRAM(S): 400 CAPSULE ORAL at 21:20

## 2022-07-03 RX ADMIN — APIXABAN 2.5 MILLIGRAM(S): 2.5 TABLET, FILM COATED ORAL at 17:14

## 2022-07-03 RX ADMIN — Medication 20 MILLIGRAM(S): at 08:09

## 2022-07-03 RX ADMIN — Medication 10 MILLIGRAM(S): at 06:55

## 2022-07-03 RX ADMIN — BUPROPION HYDROCHLORIDE 150 MILLIGRAM(S): 150 TABLET, EXTENDED RELEASE ORAL at 11:55

## 2022-07-03 NOTE — PROGRESS NOTE ADULT - PROBLEM SELECTOR PLAN 2
- Chronic. RVR in the setting of fever and dehydration. -RESOLVED  - TTE 6/15/22: Mild LV hypertrophy, normal systolic function, eLVEF of 60%.Grossly normal RV.  LA enlargement. Trace to mild MR / Trace TR.  - Continue home Cardizem CD  120 qd with hold parameters.   - Continue home Eliquis 2.5 mg 2x day   - Monitor in tele: No events overnight, afib 70-80s

## 2022-07-03 NOTE — PROGRESS NOTE ADULT - PROBLEM SELECTOR PLAN 6
Resolved  - BUN 21/Cr 1.3 on admission.  Baseline Cr 0.9  - LATOYA 2/2 current sepsis, dehydration   - Cr 1.0 today  - s/p 2 lt NS bolus.  - Losartan restarted 7/3/22  - Encourage fluid intake.   - Monitor and trend Cr.

## 2022-07-03 NOTE — PROGRESS NOTE ADULT - PROBLEM SELECTOR PLAN 3
- Chronic and stable   - Medication adjusted during the last admission   - Continue home Cardizem CD  120 with hold parameters.   - Continue Losartan 25 qd with hold parameters.  - Lasix 20mg IV x1 given to control BP given resolved LATOYA (Creatinine 1.0 today)

## 2022-07-03 NOTE — PROGRESS NOTE ADULT - SUBJECTIVE AND OBJECTIVE BOX
Covering [facility], Division of Infectious Diseases  LADONNA Chung, KAPIL Bhardwaj, NAKIA Cowan STEPHEN is a 72yMale , patient examined and chart reviewed.       INTERVAL HPI/ OVERNIGHT EVENTS:   Afebrile. In chair. On RA.   Wife at bedside    PAST MEDICAL & SURGICAL HISTORY:  TBI (traumatic brain injury)  brain bleed  HTN (hypertension)  Atrial fibrillation  Peripheral neuropathy  Major depression  HLD (hyperlipidemia)  CAD (coronary artery disease)  BPH (benign prostatic hyperplasia)  Pneumonia due to COVID-19 virus  June 2022      For details regarding the patient's social history, family history, and other miscellaneous elements, please refer the initial infectious diseases consultation and/or the admitting history and physical examination for this admission.    ROS:  Unable to obtain due to : poor historian      Current inpatient medications :    ANTIBIOTICS/RELEVANT:  cefepime   IVPB 2000 milliGRAM(s) IV Intermittent every 12 hours  vancomycin  IVPB 1000 milliGRAM(s) IV Intermittent every 24 hours    acetaminophen     Tablet .. 650 milliGRAM(s) Oral every 6 hours PRN  ALBUTerol    90 MICROgram(s) HFA Inhaler 1 Puff(s) Inhalation three times a day  ammonium lactate 12% Lotion 1 Application(s) Topical two times a day  apixaban 2.5 milliGRAM(s) Oral every 12 hours  aspirin  chewable 81 milliGRAM(s) Oral daily  atorvastatin 40 milliGRAM(s) Oral at bedtime  benzonatate 100 milliGRAM(s) Oral three times a day PRN  bethanechol 10 milliGRAM(s) Oral three times a day  buPROPion XL (24-Hour) . 150 milliGRAM(s) Oral daily  dextrose 5%. 1000 milliLiter(s) IV Continuous <Continuous>  diltiazem    milliGRAM(s) Oral daily  gabapentin 300 milliGRAM(s) Oral three times a day  latanoprost 0.005% Ophthalmic Solution 1 Drop(s) Both EYES at bedtime  losartan 25 milliGRAM(s) Oral daily  methylphenidate 20 milliGRAM(s) Oral <User Schedule>  mupirocin 2% Nasal 1 Application(s) Both Nostrils two times a day  senna 2 Tablet(s) Oral at bedtime  sodium chloride 0.65% Nasal 1 Spray(s) Both Nostrils four times a day PRN  tamsulosin 0.4 milliGRAM(s) Oral at bedtime  tiZANidine 4 milliGRAM(s) Oral three times a day      Objective:    07-02 @ 07:01  -  07-03 @ 07:00  --------------------------------------------------------  IN: 0 mL / OUT: 3100 mL / NET: -3100 mL    07-03 @ 07:01  -  07-03 @ 18:56  --------------------------------------------------------  IN: 0 mL / OUT: 800 mL / NET: -800 mL      T(C): 36.6 (07-03-22 @ 05:10), Max: 37.1 (07-02-22 @ 21:00)  HR: 73 (07-03-22 @ 12:32) (73 - 83)  BP: 149/83 (07-03-22 @ 12:32) (149/83 - 169/102)  RR: 20 (07-03-22 @ 12:32) (19 - 20)  SpO2: 96% (07-03-22 @ 12:32) (95% - 100%)      Physical Exam:  General:  no acute distress  Neck: supple, trachea midline  Lungs: decreased, no wheeze/rhonchi  Cardiovascular: regular rate and rhythm, S1 S2  Abdomen: soft, nontender,  bowel sounds normal  Neurological: awake alert  Skin: no rash  Extremities: no edema      LABS:                          11.8   10.16 )-----------( 188      ( 03 Jul 2022 06:20 )             36.3       07-03    143  |  107  |  15  ----------------------------<  116<H>  3.4<L>   |  30  |  1.00    Ca    8.6      03 Jul 2022 06:20    TPro  5.9<L>  /  Alb  2.9<L>  /  TBili  0.7  /  DBili  x   /  AST  14<L>  /  ALT  24  /  AlkPhos  78  07-03      MICROBIOLOGY:        RADIOLOGY & ADDITIONAL STUDIES:     CT Abdomen and Pelvis No Cont (06.30.22 @ 23:35) >    ACC: [id number] EXAM:  CT ABDOMEN AND PELVIS                        ACC: [id number] EXAM:  CT CHEST                          PROCEDURE DATE:  06/30/2022          INTERPRETATION:  CLINICAL INFORMATION: Abnormal chest x-ray, airspace   opacities. Abdominal pain    COMPARISON: None.    CONTRAST/COMPLICATIONS:  IV Contrast: NONE  Oral Contrast: NONE  Complications: None reported at time of study completion    PROCEDURE:  CT of the Chest, Abdomen and Pelvis was performed. Sagittal and coronal   reformats were performed. The lack of intravenous contrast material   limits diagnostic sensitivity in evaluating the solid organs/vasculature.   Patient arm position at sides produces beam hardening artifact which   degrades image quality.    FINDINGS:  CHEST:  LUNGS AND LARGE AIRWAYS: Patent central airways. Mild/moderate bilateral   lower lobe patchy airspace opacities. Minimal lingular airspace opacity   and/or atelectasis.  PLEURA: No pleural effusion.  VESSELS: No aortic dilatation. Aortic /coronaryartery calcification.  HEART: Cardiomegaly. Trace pericardial effusion.  MEDIASTINUM AND ALYSSA: No lymphadenopathy.  CHEST WALL AND LOWER NECK: Gynecomastia    ABDOMEN AND PELVIS:  LIVER: Within normal limits.  BILE DUCTS: Normal caliber.  GALLBLADDER: Within normal limits.  SPLEEN: Within normal limits.  PANCREAS: Uncinate process acinar fullness compared to remainder of gland   (2/85)  ADRENALS: Within normal limits.  KIDNEYS/URETERS: Bilateral renal hypodensities, incompletely   characterized on noncontrast CT. No renal stones/obstructive uropathy.    BLADDER: Within normal limits.  REPRODUCTIVE ORGANS: Enlarged prostate.    BOWEL: No bowel obstruction. Normal appendix. No diverticulitis.  PERITONEUM: No ascites.  VESSELS: Atherosclerotic changes.  RETROPERITONEUM/LYMPH NODES: No lymphadenopathy.  ABDOMINAL WALL: Fat-containing inguinal hernias  BONES: Thoracolumbar spondylosis    IMPRESSION:  1.  Bilateral lower lobe bronchopneumonia.  2.  Uncinate pancreas acinar fullness compared to remainder of gland   (2/85). The pancreas lesion is not excludable. Consider contrast-enhanced   exam.      Assessment:  Pt is a 72 y.o male with a PMH of HTN, AFIB, BPH, bedbound 2/2 TBI, HLD, CAD, peripheral neuropathy, glaucoma, recent COVID 19 pneumonia 6/14/22 admitted with Sepsis 2/2 post-viral PNA vs possible HAP  CT w/ b/l LL bronchoPNA  WBC donwtrending  Clinically improving    Plan:   Cont Vanc Cefepime x 5- 7 days  Vanc trough prior to tonight's dose  Trend temps/WBC  Maintain aspiration precautions  Can dc isolation precautions  Pulm toileting  Increase activity    Continue with present regiment.  Appropriate use of antibiotics and adverse effects reviewed.      I have discussed the above plan of care with patient's wife in detail. She expressed understanding of the  treatment plan . Risks, benefits and alternatives discussed in detail. I have asked if she has any questions or concerns and appropriately addressed them to the best of my ability .    > 35 minutes were spent in direct patient care reviewing notes, medications ,labs data/ imaging , discussion with multidisciplinary team.    Thank you for allowing me to participate in care of your patient .    Meet Perea MD  Infectious Disease  [phone]

## 2022-07-03 NOTE — PROGRESS NOTE ADULT - PROBLEM SELECTOR PLAN 1
- Pt recently DC s/p covid infection now with superimposed PNA -Bacterial infection,   - Meet sepsis criteria with fever, tachycardia, leukocytosis.  WBC Resolved   - CT CAP: Mild-to-moderate patchy airspace disease or consolidation both lower lobes. Mild airspace disease and/or atelectasis at the lingula.  (It is a preliminary result)  - Continue  Vanco & Cefepime as d/w ID DR ANA Bhardwaj  - procal 4.05, lactate wnl 7/2/22  - MRSA PCR +, mupirocin nasal ordered  - blood cultures x 2- NGTD 7/3/22.  - Incentive spirometry , Inh PRN  - Palliative consult for goals of care.

## 2022-07-03 NOTE — PROGRESS NOTE ADULT - SUBJECTIVE AND OBJECTIVE BOX
Patient is a 72y old  Male who presents with a chief complaint of sepsis, PNA     HPI:  Pt is a 72 y.o male with a PMH of HTN, AFIB, BPH, bedbound 2/2 TBI, HLD, CAD, peripheral neuropathy, glaucoma, who presented to the ED with increasing cough and sweating. History taken via phone from wife, pt was not proving information at the moment of the exam. Pt was recently admitted from 6/17 to 6/23 for Covid infection and Afib with RVR. Wife reports that today pt started to have sweating, not feeling good, increased cough, 1 episode of diarrhea and vomiting, and she decided to brought him in. She states that he denied SOB, and his mental status was at baseline.  She also reports that after the discharge pt was doing in general good with persistent mild cough. He had nurse evaluation at home 2 days ago and was doing good and yesterday was seen by neuro with no acute concerns.     ED course:   - Vitals: 144/93, , RR 16, temp 98.2 < 101.8, O2 sat 95% with NC 4lt   - Labs: WBC 27.28, N 93%. Hb 15.2. INR 1.37. Na 146.  BUN 21/Cr 1.3.  Glucose 162.  Lactate 2.6  - CT CAP: Mild-to-moderate patchy airspace disease or consolidation both lower lobes. Mild airspace disease and/or atelectasis at the lingula.  (It is a preliminary result)  - EKG: Afib , right axis deviation, RV hypertrophy septal and inferior infarct age undeterm.   - s/p Zosyn and vanco x1 dose, NS 2lt bolus, Tylenol suppository, Zofran IV x1.  (01 Jul 2022 00:35)    INTERVAL HPI:  7/1/22: pt seen, Examined, feels much better, on IV cefepime & Vanco, WBC elevated, seen by ID, No complaints  7/2/22: Pt seen, examined. feels the same as yesterday. Endorses a productive cough, but only complaint is that the food is not good. No complaints at this time. Continuing with IV cefepime & vanco., WBC -Nl , Mild Anemia ,  7/3/22: Pt seen and examined at bedside. He states he feels well, has a good appetite but would be even better if the food was good. No complaints at this time. Continue IV cefepime & vanco. WBC wnl.      OVERNIGHT EVENTS: No acute overnight events.    Home Medications:  aspirin 81 mg oral tablet: 1 tab(s) orally once a day (01 Jul 2022 02:13)  bethanechol 10 mg oral tablet: 1 tab(s) orally 3 times a day at (8am, 3pm, 10pm) (01 Jul 2022 02:13)  buPROPion 75 mg oral tablet: 1 tab(s) orally 2 times a day (10am, 10pm) (01 Jul 2022 02:13)  Eliquis 2.5 mg oral tablet: 1 tab(s) orally 2 times a day (10am, 10pm)t (01 Jul 2022 02:13)  gabapentin 300 mg oral capsule: 1 cap(s) orally 3 times a day (8am, 3pm, 10pm (01 Jul 2022 02:13)  latanoprost 0.005% ophthalmic solution: 1 drop(s) to each affected eye once a day (in the evening) (01 Jul 2022 02:13)  methylphenidate 10 mg oral tablet: 2 tab(s) orally once a day (in the morning) (01 Jul 2022 02:13)  rosuvastatin 10 mg oral tablet: 1 tab(s) orally once a day (01 Jul 2022 02:13)  tamsulosin 0.4 mg oral capsule: 1 cap(s) orally once a day (in the evening) (01 Jul 2022 02:13)  tiZANidine 4 mg oral tablet: 1 tab(s) orally 3 times a day (01 Jul 2022 02:13)  Vitamin D3 25 mcg (1000 intl units) oral tablet: 1 tab(s) orally once a day (01 Jul 2022 02:13)      MEDICATIONS  (STANDING):  ALBUTerol    90 MICROgram(s) HFA Inhaler 1 Puff(s) Inhalation three times a day  ammonium lactate 12% Lotion 1 Application(s) Topical two times a day  apixaban 2.5 milliGRAM(s) Oral every 12 hours  aspirin  chewable 81 milliGRAM(s) Oral daily  atorvastatin 40 milliGRAM(s) Oral at bedtime  bethanechol 10 milliGRAM(s) Oral three times a day  buPROPion XL (24-Hour) . 150 milliGRAM(s) Oral daily  cefepime   IVPB 2000 milliGRAM(s) IV Intermittent every 12 hours  dextrose 5%. 1000 milliLiter(s) (50 mL/Hr) IV Continuous <Continuous>  diltiazem    milliGRAM(s) Oral daily  gabapentin 300 milliGRAM(s) Oral three times a day  latanoprost 0.005% Ophthalmic Solution 1 Drop(s) Both EYES at bedtime  losartan 25 milliGRAM(s) Oral daily  methylphenidate 20 milliGRAM(s) Oral <User Schedule>  mupirocin 2% Nasal 1 Application(s) Both Nostrils two times a day  senna 2 Tablet(s) Oral at bedtime  tamsulosin 0.4 milliGRAM(s) Oral at bedtime  tiZANidine 4 milliGRAM(s) Oral three times a day  vancomycin  IVPB 1000 milliGRAM(s) IV Intermittent every 24 hours    MEDICATIONS  (PRN):  acetaminophen     Tablet .. 650 milliGRAM(s) Oral every 6 hours PRN Temp greater or equal to 38C (100.4F), Mild Pain (1 - 3)  benzonatate 100 milliGRAM(s) Oral three times a day PRN Cough  sodium chloride 0.65% Nasal 1 Spray(s) Both Nostrils four times a day PRN Nasal Congestion      No Known Allergies      Social History:  Lives with wife.  Pt is bedbound and wheelchair bound. Pt is reliant on wife for ADLs.   No h/o alcohol, tobacco, or recreational drug use  COVID vaccinated x3, last in february with Moderna. (01 Jul 2022 00:35)      REVIEW OF SYSTEMS:  CONSTITUTIONAL: No fever, No chills, No fatigue, No myalgia, No Body ache, No Weakness  EYES: No eye pain,  No visual disturbances, No discharge, No Redness  ENMT: No ear pain, No nose bleed, No vertigo; No sinus pain, No throat pain, No Congestion  NECK: No pain, No stiffness  RESPIRATORY: No cough, No wheezing, No hemoptysis, No shortness of breath  CARDIOVASCULAR: No chest pain, No palpitations  GASTROINTESTINAL: No abdominal pain, No epigastric pain. No nausea, No vomiting, No diarrhea, No constipation; [  ] BM  GENITOURINARY: No dysuria, No frequency, No urgency, No hematuria, No incontinence  NEUROLOGICAL: No headaches, No dizziness, No numbness, No tingling, No tremors, No weakness  EXTREMITIES: No Swelling, No Pain, No Edema  SKIN: [  ] No itching, burning, rashes, or lesions   MUSCULOSKELETAL: No joint pain, No joint swelling; No muscle pain, No back pain, No extremity pain  PSYCHIATRIC: No depression, No anxiety, No mood swings, No difficulty sleeping at night  PAIN SCALE: [X  ] None  [  ] Other-  ROS Unable to obtain due to: [  ] Dementia  [  ] Lethargy  [  ] Sedated  [  ] Non verbal  REST OF REVIEW OF SYSTEMS: [ X ] Normal     Vital Signs Last 24 Hrs  T(C): 36.6 (03 Jul 2022 05:10), Max: 37.1 (02 Jul 2022 21:00)  T(F): 97.9 (03 Jul 2022 05:10), Max: 98.8 (02 Jul 2022 21:00)  HR: 75 (03 Jul 2022 05:10) (67 - 83)  BP: 153/99 (03 Jul 2022 05:10) (118/78 - 169/102)  BP(mean): --  RR: 19 (03 Jul 2022 05:10) (19 - 21)  SpO2: 100% (03 Jul 2022 05:10) (95% - 100%)    CAPILLARY BLOOD GLUCOSE          I&O's Summary    02 Jul 2022 07:01  -  03 Jul 2022 07:00  --------------------------------------------------------  IN: 0 mL / OUT: 3100 mL / NET: -3100 mL      PHYSICAL EXAM:  GENERAL:  [  ] NAD, [  ] Well appearing, [  ] Agitated, [  ] Drowsy, [  ] Lethargy, [  ] Confused   HEAD:  [  ] Normal, [  ] Other  EYES:  [  ] EOMI, [  ] PERRLA, [  ] Conjunctiva and sclera clear normal, [  ] Other, [  ] Pallor, [  ] Discharge  ENMT:  [  ] Normal, [  ] Moist mucous membranes, [  ] Good dentition, [  ] No thrush  NECK:  [  ] Supple, [  ] No JVD, [  ] Normal thyroid, [  ] Lymphadenopathy, [  ] Other  CHEST/LUNG:  [  ] Clear to auscultation bilaterally, [  ] Breath Sounds equal B/L / decreased, [  ] Poor effort, [  ] No rales, [  ] No rhonchi, [  ] No wheezing  HEART:  [  ] Regular rate and rhythm, [  ] Tachycardia, [  ] Bradycardia, [  ] Irregular, [  ] No murmurs, No rubs, No gallops, [  ] PPM in place (Mfr:  )  ABDOMEN:  [  ] Soft, [  ] Nontender, [  ] Nondistended, [  ] No mass, [  ] Bowel sounds present, [  ] Obese  NERVOUS SYSTEM:  [  ] Alert & Oriented x3, [  ] Nonfocal, [  ] Confusion, [  ] Encephalopathic, [  ] Sedated, [  ] Unable to assess, [  ] Dementia, [  ] Other-  EXTREMITIES:  [  ] 2+ Peripheral Pulses, No clubbing, No cyanosis,  [  ] Edema B/L lower EXT, [  ] PVD stasis skin changes B/L lower EXT, [  ] Wound  LYMPH:  No lymphadenopathy noted  SKIN:  [  ] No rashes or lesions, [  ] Pressure ulcers, [  ] Ecchymosis, [  ] Skin tears, [  ] Other    DIET: Diet, Regular (07-01-22 @ 16:25)      LABS:                        11.8   10.16 )-----------( 188      ( 03 Jul 2022 06:20 )             36.3     03 Jul 2022 06:20    143    |  107    |  15     ----------------------------<  116    3.4     |  30     |  1.00     Ca    8.6        03 Jul 2022 06:20    TPro  5.9    /  Alb  2.9    /  TBili  0.7    /  DBili  x      /  AST  14     /  ALT  24     /  AlkPhos  78     03 Jul 2022 06:20        Culture Results:   <10,000 CFU/mL Normal Urogenital Angle (07-01 @ 05:41)  Culture Results:   No growth to date. (06-30 @ 22:20)  Culture Results:   No growth to date. (06-30 @ 22:15)            Culture - Urine (collected 01 Jul 2022 05:41)  Source: Clean Catch Clean Catch (Midstream)  Final Report (02 Jul 2022 07:10):    <10,000 CFU/mL Normal Urogenital Angle    Culture - Blood (collected 30 Jun 2022 22:20)  Source: .Blood Blood-Peripheral  Preliminary Report (02 Jul 2022 06:00):    No growth to date.    Culture - Blood (collected 30 Jun 2022 22:15)  Source: .Blood Blood-Peripheral  Preliminary Report (02 Jul 2022 06:00):    No growth to date.       Anemia Panel:      Thyroid Panel:                RADIOLOGY & ADDITIONAL TESTS:      HEALTH ISSUES - PROBLEM Dx:  Sepsis due to pneumonia    Atrial fibrillation with RVR    Hypertension    Glaucoma    LATOYA (acute kidney injury)    Need for prophylactic measure    History of BPH    H/O traumatic brain injury    Chronic major depressive disorder    CAD (coronary artery disease)          Consultant(s) Notes Reviewed:  [  ] YES     Care Discussed with [ x ] Consultants, [  ] Patient, [  ] Family, [  ] HCP, [  ] , [  ] Social Service, [  ] RN, [  ] Physical Therapy, [  ] Palliative Care Team  DVT PPX: [  ] Lovenox, [  ] SC Heparin, [  ] Coumadin, [  ] Xarelto, [  ] Eliquis, [  ] Pradaxa, [  ] IV Heparin drip, [  ] SCD, [  ] Ambulation, [  ] Contraindicated 2/2 GI Bleed, [  ] Contraindicated 2/2  Bleed, [  ] Contraindicated 2/2 Brain Bleed  Advanced Directive: [  ] None, [  ] DNR/DNI Patient is a 72y old  Male who presents with a chief complaint of sepsis, PNA     HPI:  Pt is a 72 y.o male with a PMH of HTN, AFIB, BPH, bedbound 2/2 TBI, HLD, CAD, peripheral neuropathy, glaucoma, who presented to the ED with increasing cough and sweating. History taken via phone from wife, pt was not proving information at the moment of the exam. Pt was recently admitted from 6/17 to 6/23 for Covid infection and Afib with RVR. Wife reports that today pt started to have sweating, not feeling good, increased cough, 1 episode of diarrhea and vomiting, and she decided to brought him in. She states that he denied SOB, and his mental status was at baseline.  She also reports that after the discharge pt was doing in general good with persistent mild cough. He had nurse evaluation at home 2 days ago and was doing good and yesterday was seen by neuro with no acute concerns.     ED course:   - Vitals: 144/93, , RR 16, temp 98.2 < 101.8, O2 sat 95% with NC 4lt   - Labs: WBC 27.28, N 93%. Hb 15.2. INR 1.37. Na 146.  BUN 21/Cr 1.3.  Glucose 162.  Lactate 2.6  - CT CAP: Mild-to-moderate patchy airspace disease or consolidation both lower lobes. Mild airspace disease and/or atelectasis at the lingula.  (It is a preliminary result)  - EKG: Afib , right axis deviation, RV hypertrophy septal and inferior infarct age undeterm.   - s/p Zosyn and vanco x1 dose, NS 2lt bolus, Tylenol suppository, Zofran IV x1.  (01 Jul 2022 00:35)    INTERVAL HPI:  7/1/22: pt seen, Examined, feels much better, on IV cefepime & Vanco, WBC elevated, seen by ID, No complaints  7/2/22: Pt seen, examined. feels the same as yesterday. Endorses a productive cough, but only complaint is that the food is not good. No complaints at this time. Continuing with IV cefepime & vanco., WBC -Nl , Mild Anemia ,  7/3/22: Pt seen and examined at bedside. He states he feels well, has a good appetite but would be even better if the food was good. No complaints at this time. Continue IV cefepime & vanco. WBC wnl.      OVERNIGHT EVENTS: No acute overnight events.    Home Medications:  aspirin 81 mg oral tablet: 1 tab(s) orally once a day (01 Jul 2022 02:13)  bethanechol 10 mg oral tablet: 1 tab(s) orally 3 times a day at (8am, 3pm, 10pm) (01 Jul 2022 02:13)  buPROPion 75 mg oral tablet: 1 tab(s) orally 2 times a day (10am, 10pm) (01 Jul 2022 02:13)  Eliquis 2.5 mg oral tablet: 1 tab(s) orally 2 times a day (10am, 10pm)t (01 Jul 2022 02:13)  gabapentin 300 mg oral capsule: 1 cap(s) orally 3 times a day (8am, 3pm, 10pm (01 Jul 2022 02:13)  latanoprost 0.005% ophthalmic solution: 1 drop(s) to each affected eye once a day (in the evening) (01 Jul 2022 02:13)  methylphenidate 10 mg oral tablet: 2 tab(s) orally once a day (in the morning) (01 Jul 2022 02:13)  rosuvastatin 10 mg oral tablet: 1 tab(s) orally once a day (01 Jul 2022 02:13)  tamsulosin 0.4 mg oral capsule: 1 cap(s) orally once a day (in the evening) (01 Jul 2022 02:13)  tiZANidine 4 mg oral tablet: 1 tab(s) orally 3 times a day (01 Jul 2022 02:13)  Vitamin D3 25 mcg (1000 intl units) oral tablet: 1 tab(s) orally once a day (01 Jul 2022 02:13)      MEDICATIONS  (STANDING):  ALBUTerol    90 MICROgram(s) HFA Inhaler 1 Puff(s) Inhalation three times a day  ammonium lactate 12% Lotion 1 Application(s) Topical two times a day  apixaban 2.5 milliGRAM(s) Oral every 12 hours  aspirin  chewable 81 milliGRAM(s) Oral daily  atorvastatin 40 milliGRAM(s) Oral at bedtime  bethanechol 10 milliGRAM(s) Oral three times a day  buPROPion XL (24-Hour) . 150 milliGRAM(s) Oral daily  cefepime   IVPB 2000 milliGRAM(s) IV Intermittent every 12 hours  dextrose 5%. 1000 milliLiter(s) (50 mL/Hr) IV Continuous <Continuous>  diltiazem    milliGRAM(s) Oral daily  gabapentin 300 milliGRAM(s) Oral three times a day  latanoprost 0.005% Ophthalmic Solution 1 Drop(s) Both EYES at bedtime  losartan 25 milliGRAM(s) Oral daily  methylphenidate 20 milliGRAM(s) Oral <User Schedule>  mupirocin 2% Nasal 1 Application(s) Both Nostrils two times a day  senna 2 Tablet(s) Oral at bedtime  tamsulosin 0.4 milliGRAM(s) Oral at bedtime  tiZANidine 4 milliGRAM(s) Oral three times a day  vancomycin  IVPB 1000 milliGRAM(s) IV Intermittent every 24 hours    MEDICATIONS  (PRN):  acetaminophen     Tablet .. 650 milliGRAM(s) Oral every 6 hours PRN Temp greater or equal to 38C (100.4F), Mild Pain (1 - 3)  benzonatate 100 milliGRAM(s) Oral three times a day PRN Cough  sodium chloride 0.65% Nasal 1 Spray(s) Both Nostrils four times a day PRN Nasal Congestion      No Known Allergies      Social History:  Lives with wife.  Pt is bedbound and wheelchair bound. Pt is reliant on wife for ADLs.   No h/o alcohol, tobacco, or recreational drug use  COVID vaccinated x3, last in february with Moderna. (01 Jul 2022 00:35)      REVIEW OF SYSTEMS:  CONSTITUTIONAL: No fever, No chills, No fatigue, No myalgia, No Body ache, No Weakness  EYES: No eye pain,  No visual disturbances, No discharge, No Redness  ENMT: No ear pain, No nose bleed, No vertigo; No sinus pain, No throat pain, No Congestion  NECK: No pain, No stiffness  RESPIRATORY: No cough, No wheezing, No hemoptysis, No shortness of breath  CARDIOVASCULAR: No chest pain, No palpitations  GASTROINTESTINAL: No abdominal pain, No epigastric pain. No nausea, No vomiting, No diarrhea, No constipation; [  ] BM  GENITOURINARY: No dysuria, No frequency, No urgency, No hematuria, No incontinence  NEUROLOGICAL: No headaches, No dizziness, No numbness, No tingling, No tremors, No weakness  EXTREMITIES: No Swelling, No Pain, No Edema  SKIN: [ X ] No itching, burning, rashes, or lesions   MUSCULOSKELETAL: No joint pain, No joint swelling; No muscle pain, No back pain, No extremity pain  PSYCHIATRIC: No depression, No anxiety, No mood swings, No difficulty sleeping at night  PAIN SCALE: [X  ] None  [  ] Other-  ROS Unable to obtain due to: [  ] Dementia  [  ] Lethargy  [  ] Sedated  [  ] Non verbal  REST OF REVIEW OF SYSTEMS: [ X ] Normal     Vital Signs Last 24 Hrs  T(C): 36.6 (03 Jul 2022 05:10), Max: 37.1 (02 Jul 2022 21:00)  T(F): 97.9 (03 Jul 2022 05:10), Max: 98.8 (02 Jul 2022 21:00)  HR: 75 (03 Jul 2022 05:10) (67 - 83)  BP: 153/99 (03 Jul 2022 05:10) (118/78 - 169/102)  BP(mean): --  RR: 19 (03 Jul 2022 05:10) (19 - 21)  SpO2: 100% (03 Jul 2022 05:10) (95% - 100%)    CAPILLARY BLOOD GLUCOSE          I&O's Summary    02 Jul 2022 07:01  -  03 Jul 2022 07:00  --------------------------------------------------------  IN: 0 mL / OUT: 3100 mL / NET: -3100 mL      PHYSICAL EXAM:  GENERAL:  [ X ] NAD, [X  ] Well appearing, [  ] Agitated, [  ] Drowsy, [  ] Lethargy, [  ] Confused   HEAD:  [X  ] Normal, [  ] Other  EYES:  [X  ] EOMI, [X  ] PERRLA, [X  ] Conjunctiva and sclera clear normal, [  ] Other, [  ] Pallor, [  ] Discharge  ENMT:  [ X ] Normal, [  ] Moist mucous membranes, [  ] Good dentition, [  ] No thrush  NECK:  [X  ] Supple, [  ] No JVD, [  ] Normal thyroid, [  ] Lymphadenopathy, [  ] Other  CHEST/LUNG:  [  ] Clear to auscultation bilaterally, [X  ] Breath Sounds equal B/L / decreased, [ X ] Poor effort, [X  ] No rales, [X  ] No rhonchi, [ X ] No wheezing  HEART:  [ X ] Regular rate and rhythm, [  ] Tachycardia, [  ] Bradycardia, [  ] Irregular, [ X ] No murmurs, No rubs, No gallops, [  ] PPM in place (Mfr:  )  ABDOMEN:  [ X ] Soft, [X  ] Nontender, [X] Nondistended, [X  ] No mass, [ X ] Bowel sounds present, [  ] Obese  NERVOUS SYSTEM:  [X  ] Alert & Oriented x3, [  ] Nonfocal, [  ] Confusion, [  ] Encephalopathic, [  ] Sedated, [  ] Unable to assess, [  ] Dementia, [  ] Other-  EXTREMITIES:  [ X ] 2+ Peripheral Pulses, No clubbing, No cyanosis,  [X  ] 1+ pitting Edema B/L lower EXT, [  ] PVD stasis skin changes B/L lower EXT, [  ] Wound  LYMPH:  No lymphadenopathy noted  SKIN:  [  ] No rashes or lesions, [  ] Pressure ulcers, [  ] Ecchymosis, [  ] Skin tears, [x  ] Other - dry scabs on legs    DIET: Diet, Regular (07-01-22 @ 16:25)      LABS:                        11.8   10.16 )-----------( 188      ( 03 Jul 2022 06:20 )             36.3     03 Jul 2022 06:20    143    |  107    |  15     ----------------------------<  116    3.4     |  30     |  1.00     Ca    8.6        03 Jul 2022 06:20    TPro  5.9    /  Alb  2.9    /  TBili  0.7    /  DBili  x      /  AST  14     /  ALT  24     /  AlkPhos  78     03 Jul 2022 06:20        Culture Results:   <10,000 CFU/mL Normal Urogenital Angle (07-01 @ 05:41)  Culture Results:   No growth to date. (06-30 @ 22:20)  Culture Results:   No growth to date. (06-30 @ 22:15)            Culture - Urine (collected 01 Jul 2022 05:41)  Source: Clean Catch Clean Catch (Midstream)  Final Report (02 Jul 2022 07:10):    <10,000 CFU/mL Normal Urogenital Angle    Culture - Blood (collected 30 Jun 2022 22:20)  Source: .Blood Blood-Peripheral  Preliminary Report (02 Jul 2022 06:00):    No growth to date.    Culture - Blood (collected 30 Jun 2022 22:15)  Source: .Blood Blood-Peripheral  Preliminary Report (02 Jul 2022 06:00):    No growth to date.          RADIOLOGY & ADDITIONAL TESTS:      HEALTH ISSUES - PROBLEM Dx:  Sepsis due to pneumonia    Atrial fibrillation with RVR    Hypertension    Glaucoma    LATOYA (acute kidney injury)    Need for prophylactic measure    History of BPH    H/O traumatic brain injury    Chronic major depressive disorder    CAD (coronary artery disease)          Consultant(s) Notes Reviewed:  [ X ] YES     Care Discussed with [ x ] Consultants, [X  ] Patient, [ x ] Family, [  ] HCP, [  ] , [  ] Social Service, [ x ] RN, [  ] Physical Therapy, [  ] Palliative Care Team  DVT PPX: [  ] Lovenox, [  ] SC Heparin, [  ] Coumadin, [  ] Xarelto, [ X ] Eliquis, [  ] Pradaxa, [  ] IV Heparin drip, [  ] SCD, [  ] Ambulation, [  ] Contraindicated 2/2 GI Bleed, [  ] Contraindicated 2/2  Bleed, [  ] Contraindicated 2/2 Brain Bleed  Advanced Directive: [  ] None, [  ] DNR/DNI Patient is a 72y old  Male who presents with a chief complaint of sepsis, PNA     HPI:  Pt is a 72 y.o male with a PMH of HTN, AFIB, BPH, bedbound 2/2 TBI, HLD, CAD, peripheral neuropathy, glaucoma, who presented to the ED with increasing cough and sweating. History taken via phone from wife, pt was not proving information at the moment of the exam. Pt was recently admitted from 6/17 to 6/23 for Covid infection and Afib with RVR. Wife reports that today pt started to have sweating, not feeling good, increased cough, 1 episode of diarrhea and vomiting, and she decided to brought him in. She states that he denied SOB, and his mental status was at baseline.  She also reports that after the discharge pt was doing in general good with persistent mild cough. He had nurse evaluation at home 2 days ago and was doing good and yesterday was seen by neuro with no acute concerns.     ED course:   - Vitals: 144/93, , RR 16, temp 98.2 < 101.8, O2 sat 95% with NC 4lt   - Labs: WBC 27.28, N 93%. Hb 15.2. INR 1.37. Na 146.  BUN 21/Cr 1.3.  Glucose 162.  Lactate 2.6  - CT CAP: Mild-to-moderate patchy airspace disease or consolidation both lower lobes. Mild airspace disease and/or atelectasis at the lingula.  (It is a preliminary result)  - EKG: Afib , right axis deviation, RV hypertrophy septal and inferior infarct age undeterm.   - s/p Zosyn and vanco x1 dose, NS 2lt bolus, Tylenol suppository, Zofran IV x1.  (01 Jul 2022 00:35)    INTERVAL HPI:  7/1/22: pt seen, Examined, feels much better, on IV cefepime & Vanco, WBC elevated, seen by ID, No complaints  7/2/22: Pt seen, examined. feels the same as yesterday. Endorses a productive cough, but only complaint is that the food is not good. No complaints at this time. Continuing with IV cefepime & vanco., WBC -Nl , Mild Anemia ,  7/3/22: Pt seen and examined at bedside. He states he feels well, has a good appetite but would be even better if the food was good. No complaints at this time. Continue IV cefepime & vanco. WBC wnl.      OVERNIGHT EVENTS: No acute overnight events.    Home Medications:  aspirin 81 mg oral tablet: 1 tab(s) orally once a day (01 Jul 2022 02:13)  bethanechol 10 mg oral tablet: 1 tab(s) orally 3 times a day at (8am, 3pm, 10pm) (01 Jul 2022 02:13)  buPROPion 75 mg oral tablet: 1 tab(s) orally 2 times a day (10am, 10pm) (01 Jul 2022 02:13)  Eliquis 2.5 mg oral tablet: 1 tab(s) orally 2 times a day (10am, 10pm)t (01 Jul 2022 02:13)  gabapentin 300 mg oral capsule: 1 cap(s) orally 3 times a day (8am, 3pm, 10pm (01 Jul 2022 02:13)  latanoprost 0.005% ophthalmic solution: 1 drop(s) to each affected eye once a day (in the evening) (01 Jul 2022 02:13)  methylphenidate 10 mg oral tablet: 2 tab(s) orally once a day (in the morning) (01 Jul 2022 02:13)  rosuvastatin 10 mg oral tablet: 1 tab(s) orally once a day (01 Jul 2022 02:13)  tamsulosin 0.4 mg oral capsule: 1 cap(s) orally once a day (in the evening) (01 Jul 2022 02:13)  tiZANidine 4 mg oral tablet: 1 tab(s) orally 3 times a day (01 Jul 2022 02:13)  Vitamin D3 25 mcg (1000 intl units) oral tablet: 1 tab(s) orally once a day (01 Jul 2022 02:13)      MEDICATIONS  (STANDING):  ALBUTerol    90 MICROgram(s) HFA Inhaler 1 Puff(s) Inhalation three times a day  ammonium lactate 12% Lotion 1 Application(s) Topical two times a day  apixaban 2.5 milliGRAM(s) Oral every 12 hours  aspirin  chewable 81 milliGRAM(s) Oral daily  atorvastatin 40 milliGRAM(s) Oral at bedtime  bethanechol 10 milliGRAM(s) Oral three times a day  buPROPion XL (24-Hour) . 150 milliGRAM(s) Oral daily  cefepime   IVPB 2000 milliGRAM(s) IV Intermittent every 12 hours  dextrose 5%. 1000 milliLiter(s) (50 mL/Hr) IV Continuous <Continuous>  diltiazem    milliGRAM(s) Oral daily  gabapentin 300 milliGRAM(s) Oral three times a day  latanoprost 0.005% Ophthalmic Solution 1 Drop(s) Both EYES at bedtime  losartan 25 milliGRAM(s) Oral daily  methylphenidate 20 milliGRAM(s) Oral <User Schedule>  mupirocin 2% Nasal 1 Application(s) Both Nostrils two times a day  senna 2 Tablet(s) Oral at bedtime  tamsulosin 0.4 milliGRAM(s) Oral at bedtime  tiZANidine 4 milliGRAM(s) Oral three times a day  vancomycin  IVPB 1000 milliGRAM(s) IV Intermittent every 24 hours    MEDICATIONS  (PRN):  acetaminophen     Tablet .. 650 milliGRAM(s) Oral every 6 hours PRN Temp greater or equal to 38C (100.4F), Mild Pain (1 - 3)  benzonatate 100 milliGRAM(s) Oral three times a day PRN Cough  sodium chloride 0.65% Nasal 1 Spray(s) Both Nostrils four times a day PRN Nasal Congestion      No Known Allergies      Social History:  Lives with wife.  Pt is bedbound and wheelchair bound. Pt is reliant on wife for ADLs.   No h/o alcohol, tobacco, or recreational drug use  COVID vaccinated x3, last in february with Moderna. (01 Jul 2022 00:35)      REVIEW OF SYSTEMS:  CONSTITUTIONAL: No fever, No chills, No fatigue, No myalgia, No Body ache, No Weakness  EYES: No eye pain,  No visual disturbances, No discharge, No Redness  ENMT: No ear pain, No nose bleed, No vertigo; No sinus pain, No throat pain, No Congestion  NECK: No pain, No stiffness  RESPIRATORY: No cough, No wheezing, No hemoptysis, No shortness of breath  CARDIOVASCULAR: No chest pain, No palpitations  GASTROINTESTINAL: No abdominal pain, No epigastric pain. No nausea, No vomiting, No diarrhea, No constipation; [  ] BM  GENITOURINARY: No dysuria, No frequency, No urgency, No hematuria, No incontinence  NEUROLOGICAL: No headaches, No dizziness, No numbness, No tingling, No tremors, No weakness  EXTREMITIES: No Swelling, No Pain, No Edema  SKIN: [ X ] No itching, burning, rashes, or lesions   MUSCULOSKELETAL: No joint pain, No joint swelling; No muscle pain, No back pain, No extremity pain  PSYCHIATRIC: No depression, No anxiety, No mood swings, No difficulty sleeping at night  PAIN SCALE: [X  ] None  [  ] Other-  ROS Unable to obtain due to: [  ] Dementia  [  ] Lethargy  [  ] Sedated  [  ] Non verbal  REST OF REVIEW OF SYSTEMS: [ X ] Normal     Vital Signs Last 24 Hrs  T(C): 36.6 (03 Jul 2022 05:10), Max: 37.1 (02 Jul 2022 21:00)  T(F): 97.9 (03 Jul 2022 05:10), Max: 98.8 (02 Jul 2022 21:00)  HR: 75 (03 Jul 2022 05:10) (67 - 83)  BP: 153/99 (03 Jul 2022 05:10) (118/78 - 169/102)  BP(mean): --  RR: 19 (03 Jul 2022 05:10) (19 - 21)  SpO2: 100% (03 Jul 2022 05:10) (95% - 100%)    CAPILLARY BLOOD GLUCOSE          I&O's Summary    02 Jul 2022 07:01  -  03 Jul 2022 07:00  --------------------------------------------------------  IN: 0 mL / OUT: 3100 mL / NET: -3100 mL      PHYSICAL EXAM:  GENERAL:  [ X ] NAD, [X  ] Well appearing, [  ] Agitated, [  ] Drowsy, [  ] Lethargy, [  ] Confused   HEAD:  [X  ] Normal, [  ] Other  EYES:  [X  ] EOMI, [X  ] PERRLA, [X  ] Conjunctiva and sclera clear normal, [  ] Other, [  ] Pallor, [  ] Discharge  ENMT:  [ X ] Normal, [  ] Moist mucous membranes, [  ] Good dentition, [  ] No thrush  NECK:  [X  ] Supple, [  ] No JVD, [  ] Normal thyroid, [  ] Lymphadenopathy, [  ] Other  CHEST/LUNG:  [  ] Clear to auscultation bilaterally, [X  ] Breath Sounds equal B/L / decreased, [ X ] Poor effort, [X  ] No rales, [X  ] No rhonchi, [ X ] No wheezing  HEART:  [ X ] Regular rate and rhythm, [  ] Tachycardia, [  ] Bradycardia, [  ] Irregular, [ X ] No murmurs, No rubs, No gallops, [  ] PPM in place (Mfr:  )  ABDOMEN:  [ X ] Soft, [X  ] Nontender, [X] Nondistended, [X  ] No mass, [ X ] Bowel sounds present, [  ] Obese  NERVOUS SYSTEM:  [X  ] Alert & Oriented x3, [  ] Nonfocal, [  ] Confusion, [  ] Encephalopathic, [  ] Sedated, [  ] Unable to assess, [  ] Dementia, [  ] Other-  EXTREMITIES:  [ X ] 2+ Peripheral Pulses, No clubbing, No cyanosis,  [X  ] 1+ pitting Edema B/L lower EXT, [  ] PVD stasis skin changes B/L lower EXT, [  ] Wound  LYMPH:  No lymphadenopathy noted  SKIN:  [  ] No rashes or lesions, [  ] Pressure ulcers, [  ] Ecchymosis, [  ] Skin tears, [x  ] Other - dry scabs on legs    DIET: Diet, Regular (07-01-22 @ 16:25)      LABS:                        11.8   10.16 )-----------( 188      ( 03 Jul 2022 06:20 )             36.3     03 Jul 2022 06:20    143    |  107    |  15     ----------------------------<  116    3.4     |  30     |  1.00     Ca    8.6        03 Jul 2022 06:20    TPro  5.9    /  Alb  2.9    /  TBili  0.7    /  DBili  x      /  AST  14     /  ALT  24     /  AlkPhos  78     03 Jul 2022 06:20        Culture Results:   <10,000 CFU/mL Normal Urogenital Angle (07-01 @ 05:41)  Culture Results:   No growth to date. (06-30 @ 22:20)  Culture Results:   No growth to date. (06-30 @ 22:15)            Culture - Urine (collected 01 Jul 2022 05:41)  Source: Clean Catch Clean Catch (Midstream)  Final Report (02 Jul 2022 07:10):    <10,000 CFU/mL Normal Urogenital Angle    Culture - Blood (collected 30 Jun 2022 22:20)  Source: .Blood Blood-Peripheral  Preliminary Report (02 Jul 2022 06:00):    No growth to date.    Culture - Blood (collected 30 Jun 2022 22:15)  Source: .Blood Blood-Peripheral  Preliminary Report (02 Jul 2022 06:00):    No growth to date.          RADIOLOGY & ADDITIONAL TESTS:      HEALTH ISSUES - PROBLEM Dx:  Sepsis due to pneumonia    Atrial fibrillation with RVR    Hypertension    Glaucoma    LATOYA (acute kidney injury)    Need for prophylactic measure    History of BPH    H/O traumatic brain injury    Chronic major depressive disorder    CAD (coronary artery disease)          Consultant(s) Notes Reviewed:  [ X ] YES     Care Discussed with [ x ] Consultants, [X  ] Patient, [ x ] Family, [  ] HCP, [  ] , [  ] Social Service, [ x ] RN, [  ] Physical Therapy, [  ] Palliative Care Team  DVT PPX: [  ] Lovenox, [  ] SC Heparin, [  ] Coumadin, [  ] Xarelto, [ X ] Eliquis, [  ] Pradaxa, [  ] IV Heparin drip, [  ] SCD, [  ] Ambulation, [  ] Contraindicated 2/2 GI Bleed, [  ] Contraindicated 2/2  Bleed, [  ] Contraindicated 2/2 Brain Bleed  Advanced Directive: [ X ] None, [  ] DNR/DNI Patient is a 72y old  Male who presents with a chief complaint of sepsis, PNA     HPI:  Pt is a 72 y.o male with a PMH of HTN, AFIB, BPH, bedbound 2/2 TBI, HLD, CAD, peripheral neuropathy, glaucoma, who presented to the ED with increasing cough and sweating. History taken via phone from wife, pt was not proving information at the moment of the exam. Pt was recently admitted from 6/17 to 6/23 for Covid infection and Afib with RVR. Wife reports that today pt started to have sweating, not feeling good, increased cough, 1 episode of diarrhea and vomiting, and she decided to brought him in. She states that he denied SOB, and his mental status was at baseline.  She also reports that after the discharge pt was doing in general good with persistent mild cough. He had nurse evaluation at home 2 days ago and was doing good and yesterday was seen by neuro with no acute concerns.     ED course:   - Vitals: 144/93, , RR 16, temp 98.2 < 101.8, O2 sat 95% with NC 4lt   - Labs: WBC 27.28, N 93%. Hb 15.2. INR 1.37. Na 146.  BUN 21/Cr 1.3.  Glucose 162.  Lactate 2.6  - CT CAP: Mild-to-moderate patchy airspace disease or consolidation both lower lobes. Mild airspace disease and/or atelectasis at the lingula.  (It is a preliminary result)  - EKG: Afib , right axis deviation, RV hypertrophy septal and inferior infarct age undeterm.   - s/p Zosyn and vanco x1 dose, NS 2lt bolus, Tylenol suppository, Zofran IV x1.  (01 Jul 2022 00:35)    INTERVAL HPI:  7/1/22: pt seen, Examined, feels much better, on IV cefepime & Vanco, WBC elevated, seen by ID, No complaints  7/2/22: Pt seen, examined. feels the same as yesterday. Endorses a productive cough, but only complaint is that the food is not good. No complaints at this time. Continuing with IV cefepime & vanco., WBC -Nl , Mild Anemia ,  7/3/22: Pt seen and examined at bedside. He states he feels well, has a good appetite but would be even better if the food was good. No complaints at this time. Continue IV cefepime & vanco. WBC wnl.      OVERNIGHT EVENTS: No acute overnight events.    Home Medications:  aspirin 81 mg oral tablet: 1 tab(s) orally once a day (01 Jul 2022 02:13)  bethanechol 10 mg oral tablet: 1 tab(s) orally 3 times a day at (8am, 3pm, 10pm) (01 Jul 2022 02:13)  buPROPion 75 mg oral tablet: 1 tab(s) orally 2 times a day (10am, 10pm) (01 Jul 2022 02:13)  Eliquis 2.5 mg oral tablet: 1 tab(s) orally 2 times a day (10am, 10pm)t (01 Jul 2022 02:13)  gabapentin 300 mg oral capsule: 1 cap(s) orally 3 times a day (8am, 3pm, 10pm (01 Jul 2022 02:13)  latanoprost 0.005% ophthalmic solution: 1 drop(s) to each affected eye once a day (in the evening) (01 Jul 2022 02:13)  methylphenidate 10 mg oral tablet: 2 tab(s) orally once a day (in the morning) (01 Jul 2022 02:13)  rosuvastatin 10 mg oral tablet: 1 tab(s) orally once a day (01 Jul 2022 02:13)  tamsulosin 0.4 mg oral capsule: 1 cap(s) orally once a day (in the evening) (01 Jul 2022 02:13)  tiZANidine 4 mg oral tablet: 1 tab(s) orally 3 times a day (01 Jul 2022 02:13)  Vitamin D3 25 mcg (1000 intl units) oral tablet: 1 tab(s) orally once a day (01 Jul 2022 02:13)      MEDICATIONS  (STANDING):  ALBUTerol    90 MICROgram(s) HFA Inhaler 1 Puff(s) Inhalation three times a day  ammonium lactate 12% Lotion 1 Application(s) Topical two times a day  apixaban 2.5 milliGRAM(s) Oral every 12 hours  aspirin  chewable 81 milliGRAM(s) Oral daily  atorvastatin 40 milliGRAM(s) Oral at bedtime  bethanechol 10 milliGRAM(s) Oral three times a day  buPROPion XL (24-Hour) . 150 milliGRAM(s) Oral daily  cefepime   IVPB 2000 milliGRAM(s) IV Intermittent every 12 hours  dextrose 5%. 1000 milliLiter(s) (50 mL/Hr) IV Continuous <Continuous>  diltiazem    milliGRAM(s) Oral daily  gabapentin 300 milliGRAM(s) Oral three times a day  latanoprost 0.005% Ophthalmic Solution 1 Drop(s) Both EYES at bedtime  losartan 25 milliGRAM(s) Oral daily  methylphenidate 20 milliGRAM(s) Oral <User Schedule>  mupirocin 2% Nasal 1 Application(s) Both Nostrils two times a day  senna 2 Tablet(s) Oral at bedtime  tamsulosin 0.4 milliGRAM(s) Oral at bedtime  tiZANidine 4 milliGRAM(s) Oral three times a day  vancomycin  IVPB 1000 milliGRAM(s) IV Intermittent every 24 hours    MEDICATIONS  (PRN):  acetaminophen     Tablet .. 650 milliGRAM(s) Oral every 6 hours PRN Temp greater or equal to 38C (100.4F), Mild Pain (1 - 3)  benzonatate 100 milliGRAM(s) Oral three times a day PRN Cough  sodium chloride 0.65% Nasal 1 Spray(s) Both Nostrils four times a day PRN Nasal Congestion      No Known Allergies      Social History:  Lives with wife.  Pt is bedbound and wheelchair bound. Pt is reliant on wife for ADLs.   No h/o alcohol, tobacco, or recreational drug use  COVID vaccinated x3, last in february with Moderna. (01 Jul 2022 00:35)      REVIEW OF SYSTEMS: i feel better   CONSTITUTIONAL: No fever, No chills, No fatigue, No myalgia, No Body ache, No Weakness  EYES: No eye pain,  No visual disturbances, No discharge, No Redness  ENMT: No ear pain, No nose bleed, No vertigo; No sinus pain, No throat pain, No Congestion  NECK: No pain, No stiffness  RESPIRATORY: No cough, No wheezing, No hemoptysis, No shortness of breath  CARDIOVASCULAR: No chest pain, No palpitations  GASTROINTESTINAL: No abdominal pain, No epigastric pain. No nausea, No vomiting, No diarrhea, No constipation; [  ] BM  GENITOURINARY: No dysuria, No frequency, No urgency, No hematuria, No incontinence  NEUROLOGICAL: No headaches, No dizziness, No numbness, No tingling, No tremors, No weakness  EXTREMITIES: No Swelling, No Pain, No Edema  SKIN: [ X ] No itching, burning, rashes, or lesions   MUSCULOSKELETAL: No joint pain, No joint swelling; No muscle pain, No back pain, No extremity pain  PSYCHIATRIC: No depression, No anxiety, No mood swings, No difficulty sleeping at night  PAIN SCALE: [X  ] None  [  ] Other-  ROS Unable to obtain due to: [  ] Dementia  [  ] Lethargy  [  ] Sedated  [  ] Non verbal  REST OF REVIEW OF SYSTEMS: [ X ] Normal     Vital Signs Last 24 Hrs  T(C): 36.6 (03 Jul 2022 05:10), Max: 37.1 (02 Jul 2022 21:00)  T(F): 97.9 (03 Jul 2022 05:10), Max: 98.8 (02 Jul 2022 21:00)  HR: 75 (03 Jul 2022 05:10) (67 - 83)  BP: 153/99 (03 Jul 2022 05:10) (118/78 - 169/102)  BP(mean): --  RR: 19 (03 Jul 2022 05:10) (19 - 21)  SpO2: 100% (03 Jul 2022 05:10) (95% - 100%)    CAPILLARY BLOOD GLUCOSE          I&O's Summary    02 Jul 2022 07:01  -  03 Jul 2022 07:00  --------------------------------------------------------  IN: 0 mL / OUT: 3100 mL / NET: -3100 mL      PHYSICAL EXAM:  GENERAL:  [ X ] NAD, [X  ] Well appearing, [  ] Agitated, [  ] Drowsy, [  ] Lethargy, [  ] Confused   HEAD:  [X  ] Normal, [  ] Other  EYES:  [X  ] EOMI, [X  ] PERRLA, [X  ] Conjunctiva and sclera clear normal, [  ] Other, [  ] Pallor, [  ] Discharge  ENMT:  [ X ] Normal, [x  ] Moist mucous membranes, [  ] Good dentition, [ x ] No thrush  NECK:  [X  ] Supple, [ x ] No JVD, [ x ] Normal thyroid, [  ] Lymphadenopathy, [  ] Other  CHEST/LUNG:  [  ] Clear to auscultation bilaterally, [X  ] Breath Sounds equal B/L / decreased, [ X ] Poor effort, [X  ] No rales, [X  ] No rhonchi, [ X ] No wheezing  HEART:  [ X ] Regular rate and rhythm, [  ] Tachycardia, [  ] Bradycardia, [  ] Irregular, [ X ] No murmurs, No rubs, No gallops, [  ] PPM in place (Mfr:  )  ABDOMEN:  [ X ] Soft, [X  ] Nontender, [X] Nondistended, [X  ] No mass, [ X ] Bowel sounds present, [  ] Obese  NERVOUS SYSTEM:  [X  ] Alert & Oriented x3, [  ] Nonfocal, [  ] Confusion, [  ] Encephalopathic, [  ] Sedated, [  ] Unable to assess, [  ] Dementia, [x  ] Other-B/L lower Ext Contractures, B/L Hand Contractures  EXTREMITIES:  [ X ] 2+ Peripheral Pulses, No clubbing, No cyanosis,  [X  ] 1+ pitting Edema B/L lower EXT, [  ] PVD stasis skin changes B/L lower EXT, [  ] Wound  LYMPH:  No lymphadenopathy noted  SKIN:  [  ] No rashes or lesions, [  ] Pressure ulcers, [  ] Ecchymosis, [  ] Skin tears, [x  ] Other - dry scabs on legs    DIET: Diet, Regular (07-01-22 @ 16:25)      LABS:                        11.8   10.16 )-----------( 188      ( 03 Jul 2022 06:20 )             36.3     03 Jul 2022 06:20    143    |  107    |  15     ----------------------------<  116    3.4     |  30     |  1.00     Ca    8.6        03 Jul 2022 06:20    TPro  5.9    /  Alb  2.9    /  TBili  0.7    /  DBili  x      /  AST  14     /  ALT  24     /  AlkPhos  78     03 Jul 2022 06:20        Culture Results:   <10,000 CFU/mL Normal Urogenital Angle (07-01 @ 05:41)  Culture Results:   No growth to date. (06-30 @ 22:20)  Culture Results:   No growth to date. (06-30 @ 22:15)      Culture - Urine (collected 01 Jul 2022 05:41)  Source: Clean Catch Clean Catch (Midstream)  Final Report (02 Jul 2022 07:10):    <10,000 CFU/mL Normal Urogenital Angle    Culture - Blood (collected 30 Jun 2022 22:20)  Source: .Blood Blood-Peripheral  Preliminary Report (02 Jul 2022 06:00):    No growth to date.    Culture - Blood (collected 30 Jun 2022 22:15)  Source: .Blood Blood-Peripheral  Preliminary Report (02 Jul 2022 06:00):    No growth to date.          RADIOLOGY & ADDITIONAL TESTS:      HEALTH ISSUES - PROBLEM Dx:  Sepsis due to pneumonia    Atrial fibrillation with RVR    Hypertension    Glaucoma    LATOYA (acute kidney injury)    Need for prophylactic measure    History of BPH    H/O traumatic brain injury    Chronic major depressive disorder    CAD (coronary artery disease)          Consultant(s) Notes Reviewed:  [ X ] YES     Care Discussed with [ x ] Consultants, [X  ] Patient, [ x ] Family, [  ] HCP, [  ] , [  ] Social Service, [ x ] RN, [  ] Physical Therapy, [  ] Palliative Care Team  DVT PPX: [  ] Lovenox, [  ] SC Heparin, [  ] Coumadin, [  ] Xarelto, [ X ] Eliquis, [  ] Pradaxa, [  ] IV Heparin drip, [  ] SCD, [  ] Ambulation, [  ] Contraindicated 2/2 GI Bleed, [  ] Contraindicated 2/2  Bleed, [  ] Contraindicated 2/2 Brain Bleed  Advanced Directive: [ X ] None, [  ] DNR/DNI

## 2022-07-04 LAB
ANION GAP SERPL CALC-SCNC: 8 MMOL/L — SIGNIFICANT CHANGE UP (ref 5–17)
BASOPHILS # BLD AUTO: 0.06 K/UL — SIGNIFICANT CHANGE UP (ref 0–0.2)
BASOPHILS NFR BLD AUTO: 0.6 % — SIGNIFICANT CHANGE UP (ref 0–2)
BUN SERPL-MCNC: 14 MG/DL — SIGNIFICANT CHANGE UP (ref 7–23)
CALCIUM SERPL-MCNC: 8.7 MG/DL — SIGNIFICANT CHANGE UP (ref 8.5–10.1)
CHLORIDE SERPL-SCNC: 108 MMOL/L — SIGNIFICANT CHANGE UP (ref 96–108)
CO2 SERPL-SCNC: 27 MMOL/L — SIGNIFICANT CHANGE UP (ref 22–31)
CREAT SERPL-MCNC: 0.91 MG/DL — SIGNIFICANT CHANGE UP (ref 0.5–1.3)
EGFR: 91 ML/MIN/1.73M2 — SIGNIFICANT CHANGE UP
EOSINOPHIL # BLD AUTO: 0.43 K/UL — SIGNIFICANT CHANGE UP (ref 0–0.5)
EOSINOPHIL NFR BLD AUTO: 4.4 % — SIGNIFICANT CHANGE UP (ref 0–6)
GLUCOSE SERPL-MCNC: 136 MG/DL — HIGH (ref 70–99)
HCT VFR BLD CALC: 36.1 % — LOW (ref 39–50)
HGB BLD-MCNC: 12 G/DL — LOW (ref 13–17)
IMM GRANULOCYTES NFR BLD AUTO: 0.8 % — SIGNIFICANT CHANGE UP (ref 0–1.5)
LYMPHOCYTES # BLD AUTO: 1.05 K/UL — SIGNIFICANT CHANGE UP (ref 1–3.3)
LYMPHOCYTES # BLD AUTO: 10.6 % — LOW (ref 13–44)
MCHC RBC-ENTMCNC: 26.4 PG — LOW (ref 27–34)
MCHC RBC-ENTMCNC: 33.2 GM/DL — SIGNIFICANT CHANGE UP (ref 32–36)
MCV RBC AUTO: 79.3 FL — LOW (ref 80–100)
MONOCYTES # BLD AUTO: 0.79 K/UL — SIGNIFICANT CHANGE UP (ref 0–0.9)
MONOCYTES NFR BLD AUTO: 8 % — SIGNIFICANT CHANGE UP (ref 2–14)
NEUTROPHILS # BLD AUTO: 7.47 K/UL — HIGH (ref 1.8–7.4)
NEUTROPHILS NFR BLD AUTO: 75.6 % — SIGNIFICANT CHANGE UP (ref 43–77)
NRBC # BLD: 0 /100 WBCS — SIGNIFICANT CHANGE UP (ref 0–0)
PLATELET # BLD AUTO: 184 K/UL — SIGNIFICANT CHANGE UP (ref 150–400)
POTASSIUM SERPL-MCNC: 3.3 MMOL/L — LOW (ref 3.5–5.3)
POTASSIUM SERPL-SCNC: 3.3 MMOL/L — LOW (ref 3.5–5.3)
RBC # BLD: 4.55 M/UL — SIGNIFICANT CHANGE UP (ref 4.2–5.8)
RBC # FLD: 14.6 % — HIGH (ref 10.3–14.5)
SODIUM SERPL-SCNC: 143 MMOL/L — SIGNIFICANT CHANGE UP (ref 135–145)
VANCOMYCIN TROUGH SERPL-MCNC: 11.1 UG/ML — SIGNIFICANT CHANGE UP (ref 10–20)
WBC # BLD: 9.88 K/UL — SIGNIFICANT CHANGE UP (ref 3.8–10.5)
WBC # FLD AUTO: 9.88 K/UL — SIGNIFICANT CHANGE UP (ref 3.8–10.5)

## 2022-07-04 RX ORDER — POTASSIUM CHLORIDE 20 MEQ
40 PACKET (EA) ORAL ONCE
Refills: 0 | Status: COMPLETED | OUTPATIENT
Start: 2022-07-04 | End: 2022-07-04

## 2022-07-04 RX ORDER — POTASSIUM CHLORIDE 20 MEQ
40 PACKET (EA) ORAL EVERY 4 HOURS
Refills: 0 | Status: COMPLETED | OUTPATIENT
Start: 2022-07-04 | End: 2022-07-04

## 2022-07-04 RX ADMIN — Medication 40 MILLIEQUIVALENT(S): at 22:16

## 2022-07-04 RX ADMIN — APIXABAN 2.5 MILLIGRAM(S): 2.5 TABLET, FILM COATED ORAL at 05:46

## 2022-07-04 RX ADMIN — CEFEPIME 100 MILLIGRAM(S): 1 INJECTION, POWDER, FOR SOLUTION INTRAMUSCULAR; INTRAVENOUS at 09:43

## 2022-07-04 RX ADMIN — TAMSULOSIN HYDROCHLORIDE 0.4 MILLIGRAM(S): 0.4 CAPSULE ORAL at 21:57

## 2022-07-04 RX ADMIN — TIZANIDINE 4 MILLIGRAM(S): 4 TABLET ORAL at 11:32

## 2022-07-04 RX ADMIN — Medication 40 MILLIEQUIVALENT(S): at 16:07

## 2022-07-04 RX ADMIN — CEFEPIME 100 MILLIGRAM(S): 1 INJECTION, POWDER, FOR SOLUTION INTRAMUSCULAR; INTRAVENOUS at 21:56

## 2022-07-04 RX ADMIN — Medication 120 MILLIGRAM(S): at 05:44

## 2022-07-04 RX ADMIN — Medication 10 MILLIGRAM(S): at 21:57

## 2022-07-04 RX ADMIN — Medication 81 MILLIGRAM(S): at 11:30

## 2022-07-04 RX ADMIN — Medication 10 MILLIGRAM(S): at 11:29

## 2022-07-04 RX ADMIN — GABAPENTIN 300 MILLIGRAM(S): 400 CAPSULE ORAL at 21:58

## 2022-07-04 RX ADMIN — ALBUTEROL 1 PUFF(S): 90 AEROSOL, METERED ORAL at 21:59

## 2022-07-04 RX ADMIN — Medication 10 MILLIGRAM(S): at 05:45

## 2022-07-04 RX ADMIN — LOSARTAN POTASSIUM 25 MILLIGRAM(S): 100 TABLET, FILM COATED ORAL at 05:44

## 2022-07-04 RX ADMIN — Medication 250 MILLIGRAM(S): at 02:00

## 2022-07-04 RX ADMIN — ATORVASTATIN CALCIUM 40 MILLIGRAM(S): 80 TABLET, FILM COATED ORAL at 21:58

## 2022-07-04 RX ADMIN — LATANOPROST 1 DROP(S): 0.05 SOLUTION/ DROPS OPHTHALMIC; TOPICAL at 22:23

## 2022-07-04 RX ADMIN — TIZANIDINE 4 MILLIGRAM(S): 4 TABLET ORAL at 05:45

## 2022-07-04 RX ADMIN — GABAPENTIN 300 MILLIGRAM(S): 400 CAPSULE ORAL at 13:17

## 2022-07-04 RX ADMIN — APIXABAN 2.5 MILLIGRAM(S): 2.5 TABLET, FILM COATED ORAL at 17:20

## 2022-07-04 RX ADMIN — Medication 100 MILLIGRAM(S): at 02:47

## 2022-07-04 RX ADMIN — Medication 20 MILLIGRAM(S): at 07:42

## 2022-07-04 RX ADMIN — SENNA PLUS 2 TABLET(S): 8.6 TABLET ORAL at 21:57

## 2022-07-04 RX ADMIN — MUPIROCIN 1 APPLICATION(S): 20 OINTMENT TOPICAL at 17:20

## 2022-07-04 RX ADMIN — TIZANIDINE 4 MILLIGRAM(S): 4 TABLET ORAL at 21:59

## 2022-07-04 RX ADMIN — GABAPENTIN 300 MILLIGRAM(S): 400 CAPSULE ORAL at 05:45

## 2022-07-04 RX ADMIN — MUPIROCIN 1 APPLICATION(S): 20 OINTMENT TOPICAL at 05:46

## 2022-07-04 RX ADMIN — BUPROPION HYDROCHLORIDE 150 MILLIGRAM(S): 150 TABLET, EXTENDED RELEASE ORAL at 11:30

## 2022-07-04 NOTE — PROGRESS NOTE ADULT - PROBLEM SELECTOR PLAN 3
- Chronic and stable   - Medication adjusted during the last admission   - Continue home Cardizem CD  120 with hold parameters.   - Losartan 25 qd restart

## 2022-07-04 NOTE — DIETITIAN INITIAL EVALUATION ADULT - OTHER INFO
73 y/o male adm with sepsis, COVID +. PMH pneumonia 2/2 COVID , BPH, CAD, HLD, HTN, TBI, afib, major depression. Pt visited at bedside this am. Pt sitting up in chair eating breakfast. Pt recently in hospital and after d/c pt's appetite has been good. Pt does not care for hospital food too much. Pt offers no food preferences and declines po supplements. #.

## 2022-07-04 NOTE — PROGRESS NOTE ADULT - PROBLEM SELECTOR PLAN 1
- Pt recently DC s/p covid infection now with superimposed PNA -Bacterial infection,   - Meet sepsis criteria with fever, tachycardia, leukocytosis.  WBC Resolved   - CT CAP: Mild-to-moderate patchy airspace disease or consolidation both lower lobes. Mild airspace disease and/or atelectasis at the lingula.  (It is a preliminary result)  - Continue  Vanco & Start Cefepime as d/w ID DR ANA Bhardwaj 5-7 days Abx   - procal 4.05, lactate wnl 7/2/22  - MRSA PCR +, mupirocin nasal ordered  - blood cultures x 2- NGTD 7/2/22.  - Incentive spirometry , Inh PRN

## 2022-07-04 NOTE — PROGRESS NOTE ADULT - PROBLEM SELECTOR PLAN 6
Resolved  - BUN 21/Cr 1.3 on admission.  Baseline Cr 0.9  - LATOYA 2/2 current sepsis, dehydration   - Cr 1.0 today  - s/p 2 lt NS bolus.  - Restart  losartan for now   - Encourage fluid intake.   - Monitor and trend Cr.

## 2022-07-04 NOTE — PROGRESS NOTE ADULT - SUBJECTIVE AND OBJECTIVE BOX
Covering [facility], Division of Infectious Diseases  LADONNA Chung, KAPIL Bhardwaj, NAKIA Cowan STEPHEN is a 72yMale , patient examined and chart reviewed.       INTERVAL HPI/ OVERNIGHT EVENTS:   Afebrile. On RA.   No events. Weak.    PAST MEDICAL & SURGICAL HISTORY:  TBI (traumatic brain injury)  brain bleed  HTN (hypertension)  Atrial fibrillation  Peripheral neuropathy  Major depression  HLD (hyperlipidemia)  CAD (coronary artery disease)  BPH (benign prostatic hyperplasia)  Pneumonia due to COVID-19 virus  June 2022      For details regarding the patient's social history, family history, and other miscellaneous elements, please refer the initial infectious diseases consultation and/or the admitting history and physical examination for this admission.    ROS:  Unable to obtain due to : poor historian      Current inpatient medications :    ANTIBIOTICS/RELEVANT:  cefepime   IVPB 2000 milliGRAM(s) IV Intermittent every 12 hours  vancomycin  IVPB 1000 milliGRAM(s) IV Intermittent every 24 hours    MEDICATIONS  (STANDING):  ALBUTerol    90 MICROgram(s) HFA Inhaler 1 Puff(s) Inhalation three times a day  ammonium lactate 12% Lotion 1 Application(s) Topical two times a day  apixaban 2.5 milliGRAM(s) Oral every 12 hours  aspirin  chewable 81 milliGRAM(s) Oral daily  atorvastatin 40 milliGRAM(s) Oral at bedtime  bethanechol 10 milliGRAM(s) Oral three times a day  buPROPion XL (24-Hour) . 150 milliGRAM(s) Oral daily  dextrose 5%. 1000 milliLiter(s) (50 mL/Hr) IV Continuous <Continuous>  diltiazem    milliGRAM(s) Oral daily  gabapentin 300 milliGRAM(s) Oral three times a day  latanoprost 0.005% Ophthalmic Solution 1 Drop(s) Both EYES at bedtime  losartan 25 milliGRAM(s) Oral daily  methylphenidate 20 milliGRAM(s) Oral <User Schedule>  mupirocin 2% Nasal 1 Application(s) Both Nostrils two times a day  senna 2 Tablet(s) Oral at bedtime  tamsulosin 0.4 milliGRAM(s) Oral at bedtime  tiZANidine 4 milliGRAM(s) Oral three times a day      MEDICATIONS  (PRN):  acetaminophen     Tablet .. 650 milliGRAM(s) Oral every 6 hours PRN Temp greater or equal to 38C (100.4F), Mild Pain (1 - 3)  benzonatate 100 milliGRAM(s) Oral three times a day PRN Cough  sodium chloride 0.65% Nasal 1 Spray(s) Both Nostrils four times a day PRN Nasal Congestion      Objective:  Vital Signs Last 24 Hrs  T(C): 36.7 (04 Jul 2022 20:36), Max: 37.1 (04 Jul 2022 11:10)  T(F): 98.1 (04 Jul 2022 20:36), Max: 98.7 (04 Jul 2022 11:10)  HR: 82 (04 Jul 2022 20:36) (75 - 82)  BP: 150/94 (04 Jul 2022 20:36) (131/81 - 150/94)  RR: 18 (04 Jul 2022 20:36) (18 - 19)  SpO2: 95% (04 Jul 2022 20:36) (93% - 95%)      Physical Exam:  General:  no acute distress  Neck: supple, trachea midline  Lungs: decreased, no wheeze/rhonchi  Cardiovascular: regular rate and rhythm, S1 S2  Abdomen: soft, nontender,  bowel sounds normal  Neurological: awake alert  Skin: no rash  Extremities: no edema      LABS:                        12.0   9.88  )-----------( 184      ( 04 Jul 2022 06:18 )             36.1   07-04    143  |  108  |  14  ----------------------------<  136<H>  3.3<L>   |  27  |  0.91    Ca    8.7      04 Jul 2022 06:18    TPro  5.9<L>  /  Alb  2.9<L>  /  TBili  0.7  /  DBili  x   /  AST  14<L>  /  ALT  24  /  AlkPhos  78  07-03    Vancomycin Level, Trough (07.03.22 @ 23:56)    Vancomycin Level, Trough: 11.1      MICROBIOLOGY:        RADIOLOGY & ADDITIONAL STUDIES:     CT Abdomen and Pelvis No Cont (06.30.22 @ 23:35) >    ACC: [id number] EXAM:  CT ABDOMEN AND PELVIS                        ACC: [id number] EXAM:  CT CHEST                          PROCEDURE DATE:  06/30/2022          INTERPRETATION:  CLINICAL INFORMATION: Abnormal chest x-ray, airspace   opacities. Abdominal pain    COMPARISON: None.    CONTRAST/COMPLICATIONS:  IV Contrast: NONE  Oral Contrast: NONE  Complications: None reported at time of study completion    PROCEDURE:  CT of the Chest, Abdomen and Pelvis was performed. Sagittal and coronal   reformats were performed. The lack of intravenous contrast material   limits diagnostic sensitivity in evaluating the solid organs/vasculature.   Patient arm position at sides produces beam hardening artifact which   degrades image quality.    FINDINGS:  CHEST:  LUNGS AND LARGE AIRWAYS: Patent central airways. Mild/moderate bilateral   lower lobe patchy airspace opacities. Minimal lingular airspace opacity   and/or atelectasis.  PLEURA: No pleural effusion.  VESSELS: No aortic dilatation. Aortic /coronaryartery calcification.  HEART: Cardiomegaly. Trace pericardial effusion.  MEDIASTINUM AND ALYSSA: No lymphadenopathy.  CHEST WALL AND LOWER NECK: Gynecomastia    ABDOMEN AND PELVIS:  LIVER: Within normal limits.  BILE DUCTS: Normal caliber.  GALLBLADDER: Within normal limits.  SPLEEN: Within normal limits.  PANCREAS: Uncinate process acinar fullness compared to remainder of gland   (2/85)  ADRENALS: Within normal limits.  KIDNEYS/URETERS: Bilateral renal hypodensities, incompletely   characterized on noncontrast CT. No renal stones/obstructive uropathy.    BLADDER: Within normal limits.  REPRODUCTIVE ORGANS: Enlarged prostate.    BOWEL: No bowel obstruction. Normal appendix. No diverticulitis.  PERITONEUM: No ascites.  VESSELS: Atherosclerotic changes.  RETROPERITONEUM/LYMPH NODES: No lymphadenopathy.  ABDOMINAL WALL: Fat-containing inguinal hernias  BONES: Thoracolumbar spondylosis    IMPRESSION:  1.  Bilateral lower lobe bronchopneumonia.  2.  Uncinate pancreas acinar fullness compared to remainder of gland   (2/85). The pancreas lesion is not excludable. Consider contrast-enhanced   exam.      Assessment:  Pt is a 72 y.o male with a PMH of HTN, AFIB, BPH, bedbound 2/2 TBI, HLD, CAD, peripheral neuropathy, glaucoma, recent COVID 19 pneumonia 6/14/22 admitted with Sepsis 2/2 post-viral PNA vs possible HAP  CT w/ b/l LL bronchoPNA  WBC donwtrending  Clinically improving    Plan:   Cont Vanc Cefepime x 5- 7 days  Trend temps/WBC  Maintain aspiration precautions  Pulm toileting  Increase activity  Off isolation precautions    Continue with present regiment.  Appropriate use of antibiotics and adverse effects reviewed.      > 35 minutes were spent in direct patient care reviewing notes, medications ,labs data/ imaging , discussion with multidisciplinary team.    Thank you for allowing me to participate in care of your patient .    Meet Perea MD  Infectious Disease  [phone]

## 2022-07-04 NOTE — DIETITIAN INITIAL EVALUATION ADULT - PERTINENT LABORATORY DATA
07-04    143  |  108  |  14  ----------------------------<  136<H>  3.3<L>   |  27  |  0.91    Ca    8.7      04 Jul 2022 06:18    TPro  5.9<L>  /  Alb  2.9<L>  /  TBili  0.7  /  DBili  x   /  AST  14<L>  /  ALT  24  /  AlkPhos  78  07-03  A1C with Estimated Average Glucose Result: 6.9 % (06-15-22 @ 10:52)

## 2022-07-04 NOTE — DIETITIAN INITIAL EVALUATION ADULT - PERTINENT MEDS FT
MEDICATIONS  (STANDING):  ALBUTerol    90 MICROgram(s) HFA Inhaler 1 Puff(s) Inhalation three times a day  ammonium lactate 12% Lotion 1 Application(s) Topical two times a day  apixaban 2.5 milliGRAM(s) Oral every 12 hours  aspirin  chewable 81 milliGRAM(s) Oral daily  atorvastatin 40 milliGRAM(s) Oral at bedtime  bethanechol 10 milliGRAM(s) Oral three times a day  buPROPion XL (24-Hour) . 150 milliGRAM(s) Oral daily  cefepime   IVPB 2000 milliGRAM(s) IV Intermittent every 12 hours  dextrose 5%. 1000 milliLiter(s) (50 mL/Hr) IV Continuous <Continuous>  diltiazem    milliGRAM(s) Oral daily  gabapentin 300 milliGRAM(s) Oral three times a day  latanoprost 0.005% Ophthalmic Solution 1 Drop(s) Both EYES at bedtime  losartan 25 milliGRAM(s) Oral daily  methylphenidate 20 milliGRAM(s) Oral <User Schedule>  mupirocin 2% Nasal 1 Application(s) Both Nostrils two times a day  senna 2 Tablet(s) Oral at bedtime  tamsulosin 0.4 milliGRAM(s) Oral at bedtime  tiZANidine 4 milliGRAM(s) Oral three times a day  vancomycin  IVPB 1000 milliGRAM(s) IV Intermittent every 24 hours    MEDICATIONS  (PRN):  acetaminophen     Tablet .. 650 milliGRAM(s) Oral every 6 hours PRN Temp greater or equal to 38C (100.4F), Mild Pain (1 - 3)  benzonatate 100 milliGRAM(s) Oral three times a day PRN Cough  sodium chloride 0.65% Nasal 1 Spray(s) Both Nostrils four times a day PRN Nasal Congestion

## 2022-07-04 NOTE — PROGRESS NOTE ADULT - SUBJECTIVE AND OBJECTIVE BOX
Patient is a 72y old  Male who presents with a chief complaint of Sepsis     (04 Jul 2022 11:30)    HPI:  Pt is a 72 y.o male with a PMH of HTN, AFIB, BPH, bedbound 2/2 TBI, HLD, CAD, peripheral neuropathy, glaucoma, who presented to the ED with increasing cough and sweating. History taken via phone from wife, pt was not proving information at the moment of the exam. Pt was recently admitted from 6/17 to 6/23 for Covid infection and Afib with RVR. Wife reports that today pt started to have sweating, not feeling good, increased cough, 1 episode of diarrhea and vomiting, and she decided to brought him in. She states that he denied SOB, and his mental status was at baseline.  She also reports that after the discharge pt was doing in general good with persistent mild cough. He had nurse evaluation at home 2 days ago and was doing good and yesterday was seen by neuro with no acute concerns.     ED course:   - Vitals: 144/93, , RR 16, temp 98.2 < 101.8, O2 sat 95% with NC 4lt   - Labs: WBC 27.28, N 93%. Hb 15.2. INR 1.37. Na 146.  BUN 21/Cr 1.3.  Glucose 162.  Lactate 2.6  - CT CAP: Mild-to-moderate patchy airspace disease or consolidation both lower lobes. Mild airspace disease and/or atelectasis at the lingula.  (It is a preliminary result)  - EKG: Afib , right axis deviation, RV hypertrophy septal and inferior infarct age undeterm.   - s/p Zosyn and vanco x1 dose, NS 2lt bolus, Tylenol suppository, Zofran IV x1.  (01 Jul 2022 00:35)    INTERVAL HPI:  7/1/22: pt seen, Examined, feels much better, on IV cefepime & Vanco, WBC elevated, seen by ID, No complaints  7/2/22: Pt seen, examined. feels the same as yesterday. Endorses a productive cough, but only complaint is that the food is not good. No complaints at this time. Continuing with IV cefepime & vanco., WBC -Nl , Mild Anemia ,  7/3/22: Pt seen and examined at bedside. He states he feels well, has a good appetite but would be even better if the food was good. No complaints at this time. Continue IV cefepime & vanco. WBC wnl.  7/4/22:Pt seen, examined, at bedside, WBC stable ,On IV Abx ,Off Isolation. MBS plan in AM , D/C Plan     OVERNIGHT EVENTS: NONE    Home Medications:  aspirin 81 mg oral tablet: 1 tab(s) orally once a day (01 Jul 2022 02:13)  bethanechol 10 mg oral tablet: 1 tab(s) orally 3 times a day at (8am, 3pm, 10pm) (01 Jul 2022 02:13)  buPROPion 75 mg oral tablet: 1 tab(s) orally 2 times a day (10am, 10pm) (01 Jul 2022 02:13)  Eliquis 2.5 mg oral tablet: 1 tab(s) orally 2 times a day (10am, 10pm)t (01 Jul 2022 02:13)  gabapentin 300 mg oral capsule: 1 cap(s) orally 3 times a day (8am, 3pm, 10pm (01 Jul 2022 02:13)  latanoprost 0.005% ophthalmic solution: 1 drop(s) to each affected eye once a day (in the evening) (01 Jul 2022 02:13)  methylphenidate 10 mg oral tablet: 2 tab(s) orally once a day (in the morning) (01 Jul 2022 02:13)  rosuvastatin 10 mg oral tablet: 1 tab(s) orally once a day (01 Jul 2022 02:13)  tamsulosin 0.4 mg oral capsule: 1 cap(s) orally once a day (in the evening) (01 Jul 2022 02:13)  tiZANidine 4 mg oral tablet: 1 tab(s) orally 3 times a day (01 Jul 2022 02:13)  Vitamin D3 25 mcg (1000 intl units) oral tablet: 1 tab(s) orally once a day (01 Jul 2022 02:13)      MEDICATIONS  (STANDING):  ALBUTerol    90 MICROgram(s) HFA Inhaler 1 Puff(s) Inhalation three times a day  ammonium lactate 12% Lotion 1 Application(s) Topical two times a day  apixaban 2.5 milliGRAM(s) Oral every 12 hours  aspirin  chewable 81 milliGRAM(s) Oral daily  atorvastatin 40 milliGRAM(s) Oral at bedtime  bethanechol 10 milliGRAM(s) Oral three times a day  buPROPion XL (24-Hour) . 150 milliGRAM(s) Oral daily  cefepime   IVPB 2000 milliGRAM(s) IV Intermittent every 12 hours  dextrose 5%. 1000 milliLiter(s) (50 mL/Hr) IV Continuous <Continuous>  diltiazem    milliGRAM(s) Oral daily  gabapentin 300 milliGRAM(s) Oral three times a day  latanoprost 0.005% Ophthalmic Solution 1 Drop(s) Both EYES at bedtime  losartan 25 milliGRAM(s) Oral daily  methylphenidate 20 milliGRAM(s) Oral <User Schedule>  mupirocin 2% Nasal 1 Application(s) Both Nostrils two times a day  potassium chloride    Tablet ER 40 milliEquivalent(s) Oral every 4 hours  senna 2 Tablet(s) Oral at bedtime  tamsulosin 0.4 milliGRAM(s) Oral at bedtime  tiZANidine 4 milliGRAM(s) Oral three times a day  vancomycin  IVPB 1000 milliGRAM(s) IV Intermittent every 24 hours    MEDICATIONS  (PRN):  acetaminophen     Tablet .. 650 milliGRAM(s) Oral every 6 hours PRN Temp greater or equal to 38C (100.4F), Mild Pain (1 - 3)  benzonatate 100 milliGRAM(s) Oral three times a day PRN Cough  sodium chloride 0.65% Nasal 1 Spray(s) Both Nostrils four times a day PRN Nasal Congestion      Allergies    No Known Allergies    Intolerances        Social History:  Lives with wife.  Pt is bedbound and wheelchair bound. Pt is reliant on wife for ADLs.   No h/o alcohol, tobacco, or recreational drug use  COVID vaccinated x3, last in february with Moderna. (01 Jul 2022 00:35)      REVIEW OF SYSTEMS: i am OK  CONSTITUTIONAL: No fever, No chills, No fatigue, No myalgia, No Body ache, No Weakness  EYES: No eye pain,  No visual disturbances, No discharge, NO Redness  ENMT:  No ear pain, No nose bleed, No vertigo; No sinus pain, NO throat pain, No Congestion  NECK: No pain, No stiffness  RESPIRATORY: No cough, NO wheezing, No  hemoptysis, NO  shortness of breath  CARDIOVASCULAR: No chest pain, palpitations  GASTROINTESTINAL: No abdominal pain, NO epigastric pain. No nausea, No vomiting; No diarrhea, No constipation. [  ] BM  GENITOURINARY: No dysuria, No frequency, No urgency, No hematuria, NO incontinence  NEUROLOGICAL: No headaches, No dizziness, No numbness, No tingling, No tremors, No weakness  EXT: No Swelling, No Pain, No Edema  SKIN:  [x  ] No itching, burning, rashes, or lesions   MUSCULOSKELETAL: No joint pain ,No Jt swelling; No muscle pain, No back pain, No extremity pain  PSYCHIATRIC: No depression,  No anxiety,  No mood swings ,No difficulty sleeping at night  PAIN SCALE: [ x ] None  [  ] Other-  ROS Unable to obtain due to - [  ] Dementia  [  ] Lethargy [  ] Drowsy [  ] Sedated [  ] non verbal  REST OF REVIEW Of SYSTEM - [x  ] Normal     Vital Signs Last 24 Hrs  T(C): 37.1 (04 Jul 2022 11:10), Max: 37.1 (03 Jul 2022 19:48)  T(F): 98.7 (04 Jul 2022 11:10), Max: 98.8 (03 Jul 2022 19:48)  HR: 76 (04 Jul 2022 11:10) (75 - 80)  BP: 131/81 (04 Jul 2022 11:10) (131/81 - 155/92)  BP(mean): --  RR: 19 (04 Jul 2022 11:10) (19 - 20)  SpO2: 94% (04 Jul 2022 11:10) (93% - 98%)  Finger Stick        07-03 @ 07:01 - 07-04 @ 07:00  --------------------------------------------------------  IN: 0 mL / OUT: 1400 mL / NET: -1400 mL    07-04 @ 07:01 - 07-04 @ 18:02  --------------------------------------------------------  IN: 0 mL / OUT: 250 mL / NET: -250 mL      PHYSICAL EXAM:  GENERAL:  [ X ] NAD, [X  ] Well appearing, [  ] Agitated, [  ] Drowsy, [  ] Lethargy, [  ] Confused   HEAD:  [X  ] Normal, [  ] Other  EYES:  [X  ] EOMI, [X  ] PERRLA, [X  ] Conjunctiva and sclera clear normal, [  ] Other, [  ] Pallor, [  ] Discharge  ENMT:  [ X ] Normal, [x  ] Moist mucous membranes, [ x ] Good dentition, [ x ] No thrush  NECK:  [X  ] Supple, [ x ] No JVD, [ x ] Normal thyroid, [  ] Lymphadenopathy, [  ] Other  CHEST/LUNG:  [x  ] Clear to auscultation bilaterally, [X  ] Breath Sounds equal B/L / decreased, [ X ] Poor effort, [X  ] No rales, [X  ] No rhonchi, [ X ] No wheezing  HEART:  [ X ] Regular rate and rhythm, [  ] Tachycardia, [  ] Bradycardia, [  ] Irregular, [ X ] No murmurs, No rubs, No gallops, [  ] PPM in place (Mfr:  )  ABDOMEN:  [ X ] Soft, [X  ] Nontender, [X] Nondistended, [X  ] No mass, [ X ] Bowel sounds present, [  ] Obese  NERVOUS SYSTEM:  [X  ] Alert & Oriented x3, [  ] Nonfocal, [  ] Confusion, [  ] Encephalopathic, [  ] Sedated, [  ] Unable to assess, [  ] Dementia, [x  ] Other-B/L lower Ext Contractures, B/L Hand Contractures  EXTREMITIES:  [ X ] 2+ Peripheral Pulses, No clubbing, No cyanosis,  [X  ] 2+ pitting Edema B/L lower EXT, [  ] PVD stasis skin changes B/L lower EXT, [  ] Wound  LYMPH:  No lymphadenopathy noted  SKIN:  [ x ] No rashes or lesions, [  ] Pressure ulcers, [  ] Ecchymosis, [  ] Skin tears, [x  ] Other - dry scabs on legs    DIET: Diet, Regular (07-01-22 @ 16:25)      LABS:                        12.0   9.88  )-----------( 184      ( 04 Jul 2022 06:18 )             36.1     04 Jul 2022 06:18    143    |  108    |  14     ----------------------------<  136    3.3     |  27     |  0.91     Ca    8.7        04 Jul 2022 06:18        Culture Results:   <10,000 CFU/mL Normal Urogenital Angle (07-01 @ 05:41)  Culture Results:   No growth to date. (06-30 @ 22:20)  Culture Results:   No growth to date. (06-30 @ 22:15)      Culture - Urine (collected 01 Jul 2022 05:41)  Source: Clean Catch Clean Catch (Midstream)  Final Report (02 Jul 2022 07:10):    <10,000 CFU/mL Normal Urogenital Nagle    Culture - Blood (collected 30 Jun 2022 22:20)  Source: .Blood Blood-Peripheral  Preliminary Report (02 Jul 2022 06:00):    No growth to date.    Culture - Blood (collected 30 Jun 2022 22:15)  Source: .Blood Blood-Peripheral  Preliminary Report (02 Jul 2022 06:00):    No growth to date.    RADIOLOGY & ADDITIONAL TESTS: none      HEALTH ISSUES - PROBLEM Dx:  Sepsis due to pneumonia    Atrial fibrillation with RVR    Hypertension    CAD (coronary artery disease)    H/O traumatic brain injury    LATOYA (acute kidney injury)    Chronic major depressive disorder    History of BPH    Glaucoma    Need for prophylactic measure      Consultant(s) Notes Reviewed:  [ x ] YES     Care Discussed with [X] Consultants  [ x ] Patient  [ x ] Family- wife  [  ] HCP [ x ]   [  ] Social Service  [x  ] RN, [  ] Physical Therapy,[  ] Palliative care team  DVT PPX: [  ] Lovenox, [  ] S C Heparin, [  ] Coumadin, [  ] Xarelto, [ x ] Eliquis, [  ] Pradaxa, [  ] IV Heparin drip, [  ] SCD [  ] Contraindication 2 to GI Bleed,[  ] Ambulation [  ] Contraindicated 2 to  bleed [  ] Contraindicated 2 to Brain Bleed  Advanced directive: [ x ] None, [  ] DNR/DNI

## 2022-07-05 ENCOUNTER — TRANSCRIPTION ENCOUNTER (OUTPATIENT)
Age: 72
End: 2022-07-05

## 2022-07-05 DIAGNOSIS — R93.89 ABNORMAL FINDINGS ON DIAGNOSTIC IMAGING OF OTHER SPECIFIED BODY STRUCTURES: ICD-10-CM

## 2022-07-05 LAB
ANION GAP SERPL CALC-SCNC: 5 MMOL/L — SIGNIFICANT CHANGE UP (ref 5–17)
BUN SERPL-MCNC: 12 MG/DL — SIGNIFICANT CHANGE UP (ref 7–23)
CALCIUM SERPL-MCNC: 8.9 MG/DL — SIGNIFICANT CHANGE UP (ref 8.5–10.1)
CHLORIDE SERPL-SCNC: 109 MMOL/L — HIGH (ref 96–108)
CO2 SERPL-SCNC: 29 MMOL/L — SIGNIFICANT CHANGE UP (ref 22–31)
CREAT SERPL-MCNC: 0.94 MG/DL — SIGNIFICANT CHANGE UP (ref 0.5–1.3)
EGFR: 87 ML/MIN/1.73M2 — SIGNIFICANT CHANGE UP
GLUCOSE SERPL-MCNC: 132 MG/DL — HIGH (ref 70–99)
HCT VFR BLD CALC: 35.1 % — LOW (ref 39–50)
HGB BLD-MCNC: 11.5 G/DL — LOW (ref 13–17)
MCHC RBC-ENTMCNC: 26.4 PG — LOW (ref 27–34)
MCHC RBC-ENTMCNC: 32.8 GM/DL — SIGNIFICANT CHANGE UP (ref 32–36)
MCV RBC AUTO: 80.7 FL — SIGNIFICANT CHANGE UP (ref 80–100)
NRBC # BLD: 0 /100 WBCS — SIGNIFICANT CHANGE UP (ref 0–0)
PLATELET # BLD AUTO: 199 K/UL — SIGNIFICANT CHANGE UP (ref 150–400)
POTASSIUM SERPL-MCNC: 4.3 MMOL/L — SIGNIFICANT CHANGE UP (ref 3.5–5.3)
POTASSIUM SERPL-SCNC: 4.3 MMOL/L — SIGNIFICANT CHANGE UP (ref 3.5–5.3)
RBC # BLD: 4.35 M/UL — SIGNIFICANT CHANGE UP (ref 4.2–5.8)
RBC # FLD: 14.6 % — HIGH (ref 10.3–14.5)
SODIUM SERPL-SCNC: 143 MMOL/L — SIGNIFICANT CHANGE UP (ref 135–145)
WBC # BLD: 8 K/UL — SIGNIFICANT CHANGE UP (ref 3.8–10.5)
WBC # FLD AUTO: 8 K/UL — SIGNIFICANT CHANGE UP (ref 3.8–10.5)

## 2022-07-05 PROCEDURE — 74177 CT ABD & PELVIS W/CONTRAST: CPT | Mod: 26

## 2022-07-05 PROCEDURE — 74230 X-RAY XM SWLNG FUNCJ C+: CPT | Mod: 26

## 2022-07-05 RX ADMIN — MUPIROCIN 1 APPLICATION(S): 20 OINTMENT TOPICAL at 17:12

## 2022-07-05 RX ADMIN — Medication 1 APPLICATION(S): at 05:22

## 2022-07-05 RX ADMIN — Medication 10 MILLIGRAM(S): at 13:12

## 2022-07-05 RX ADMIN — GABAPENTIN 300 MILLIGRAM(S): 400 CAPSULE ORAL at 05:17

## 2022-07-05 RX ADMIN — TIZANIDINE 4 MILLIGRAM(S): 4 TABLET ORAL at 13:12

## 2022-07-05 RX ADMIN — Medication 20 MILLIGRAM(S): at 09:19

## 2022-07-05 RX ADMIN — Medication 10 MILLIGRAM(S): at 05:18

## 2022-07-05 RX ADMIN — BUPROPION HYDROCHLORIDE 150 MILLIGRAM(S): 150 TABLET, EXTENDED RELEASE ORAL at 09:19

## 2022-07-05 RX ADMIN — LATANOPROST 1 DROP(S): 0.05 SOLUTION/ DROPS OPHTHALMIC; TOPICAL at 21:35

## 2022-07-05 RX ADMIN — Medication 81 MILLIGRAM(S): at 09:20

## 2022-07-05 RX ADMIN — MUPIROCIN 1 APPLICATION(S): 20 OINTMENT TOPICAL at 05:18

## 2022-07-05 RX ADMIN — CEFEPIME 100 MILLIGRAM(S): 1 INJECTION, POWDER, FOR SOLUTION INTRAMUSCULAR; INTRAVENOUS at 21:35

## 2022-07-05 RX ADMIN — Medication 250 MILLIGRAM(S): at 02:04

## 2022-07-05 RX ADMIN — APIXABAN 2.5 MILLIGRAM(S): 2.5 TABLET, FILM COATED ORAL at 17:13

## 2022-07-05 RX ADMIN — Medication 120 MILLIGRAM(S): at 05:17

## 2022-07-05 RX ADMIN — LOSARTAN POTASSIUM 25 MILLIGRAM(S): 100 TABLET, FILM COATED ORAL at 05:17

## 2022-07-05 RX ADMIN — TIZANIDINE 4 MILLIGRAM(S): 4 TABLET ORAL at 05:16

## 2022-07-05 RX ADMIN — CEFEPIME 100 MILLIGRAM(S): 1 INJECTION, POWDER, FOR SOLUTION INTRAMUSCULAR; INTRAVENOUS at 09:19

## 2022-07-05 RX ADMIN — GABAPENTIN 300 MILLIGRAM(S): 400 CAPSULE ORAL at 13:12

## 2022-07-05 RX ADMIN — APIXABAN 2.5 MILLIGRAM(S): 2.5 TABLET, FILM COATED ORAL at 05:16

## 2022-07-05 NOTE — DISCHARGE NOTE PROVIDER - INSTRUCTIONS
small cup sips only small cup sips only  · Recommended Consistencies	1. Regular and thin liquids, via single/small cup sips only, as tolerated  2. Slow pacing, single/small bites/sips, and alternate solids with liquids  3. Maintain upright position during all meals  4. Maintain aspiration precautions  5. Maintain strict oral care  · Recommended Feeding/Eating Techniques	allow for swallow between intakes; alternate food with liquid; check mouth frequently for oral residue/pocketing; maintain upright posture during/after eating for 30 mins; no straws; oral hygiene; position upright (90 degrees); provide rest periods between swallows; small sips/bites  · Feeding Assistance	set up only required
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
## 2022-07-05 NOTE — DISCHARGE NOTE NURSING/CASE MANAGEMENT/SOCIAL WORK - NSDCCRTYPESERV_GEN_ALL_CORE_FT
Reinstatement of Medicaid Aide for tomorrow 7/6/22 has been confirmed Reinstatement of Medicaid Aide for tomorrow 7/7/22 has been confirmed

## 2022-07-05 NOTE — DISCHARGE NOTE PROVIDER - NSDCCPCAREPLAN_GEN_ALL_CORE_FT
PRINCIPAL DISCHARGE DIAGNOSIS  Diagnosis: Sepsis, unspecified organism  Assessment and Plan of Treatment: You were diagnosed with sepsis, a severe infection, likely caused by an infection in your lungs . This infection was controlled with antibiotics. You will continue on antibiotics for an additional********. You will follow up with your PMD upon discharge for surveillance that the infection does not return.  Please seek medical attention if you develop fever, chills, or worsening shortness of breath.       PRINCIPAL DISCHARGE DIAGNOSIS  Diagnosis: Sepsis, unspecified organism  Assessment and Plan of Treatment: You were admitted to the hospital with sepsis, a severe infection, likely caused by Pneumonia, which is an infection in your lungs. You were seen by an infectious disease specialist and started on IV antibiotics. You improved throughout your stay and were transitioned to oral antibiotics.   Upon discharge, please:  -START Ceftin twice daily for 4 more additional days, STARTING TOMORROW  Please follow up with your primary care doctor within 1 week of discharge from the hospital.  You or your family member are responsible for making all follow up appointments.  If you develop  fever, chills, or worsening shortness of breath, please return to the ED immediately.      SECONDARY DISCHARGE DIAGNOSES  Diagnosis: H/O traumatic brain injury  Assessment and Plan of Treatment: You were seen by a speech language pathologist to determine if your pneumonia was attibuted to difficulty swallowing as a result of your previous traumatic brain injury. You had a test done to determine what foods would be best to eat, and you were cleared for REGULAR diet with THIN liquids via small sips.  Please follow these instructions for eating, as advised by the speech language pathologist:  1. Regular and thin liquids, via single/small cup sips only, as tolerated  2. Slow pacing, single/small bites/sips, and alternate solids with liquids  3. Maintain upright position during all meals for 30 minutes  4. Allow for swallow between intakes; check mouth frequently for oral residue/pocketing; no straws; oral hygiene      Diagnosis: Imaging abnormality  Assessment and Plan of Treatment: On imaging of your abdomen, it was noted that your pancreas had some fullness/abnormality. You had repeat imaging of your pancreas including an MRI, which showed ******  Please follow up with your primary care doctor within 1 week of discharge from the hospital to monitor.    Diagnosis: Atrial fibrillation with RVR  Assessment and Plan of Treatment: You have a history of atrial fibrillation, which is an abnormal heart rhythm. You had an ECHO (ultrasound) of your heart and continued on your home medications, Eqliuis and Cardizem. Please continue these medications as prescribed.      Diagnosis: LATOYA (acute kidney injury)  Assessment and Plan of Treatment: You were admitted to the     PRINCIPAL DISCHARGE DIAGNOSIS  Diagnosis: Sepsis, unspecified organism  Assessment and Plan of Treatment: You were admitted to the hospital with sepsis, a severe infection, likely caused by Pneumonia, which is an infection in your lungs. You were seen by an infectious disease specialist and started on IV antibiotics. You improved throughout your stay and were transitioned to oral antibiotics.   Upon discharge, please:  -START Ceftin twice daily for 4 more additional days, STARTING TOMORROW  Please follow up with your primary care doctor within 1 week of discharge from the hospital.  You or your family member are responsible for making all follow up appointments.  If you develop  fever, chills, or worsening shortness of breath, please return to the ED immediately.      SECONDARY DISCHARGE DIAGNOSES  Diagnosis: H/O traumatic brain injury  Assessment and Plan of Treatment: You were seen by a speech language pathologist to determine if your pneumonia was attibuted to difficulty swallowing as a result of your previous traumatic brain injury. You had a test done to determine what foods would be best to eat, and you were cleared for REGULAR diet with THIN liquids via small sips.  Please follow these instructions for eating, as advised by the speech language pathologist:  1. Regular and thin liquids, via single/small cup sips only, as tolerated  2. Slow pacing, single/small bites/sips, and alternate solids with liquids  3. Maintain upright position during all meals for 30 minutes  4. Allow for swallow between intakes; check mouth frequently for oral residue/pocketing; no straws; oral hygiene      Diagnosis: Imaging abnormality  Assessment and Plan of Treatment: On imaging of your abdomen, it was noted that your pancreas had some fullness/abnormality. You had repeat imaging of your pancreas including an MRI, which showed ******  Please follow up with your primary care doctor within 1 week of discharge from the hospital to monitor.    Diagnosis: Atrial fibrillation with RVR  Assessment and Plan of Treatment: You have a history of atrial fibrillation, which is an abnormal heart rhythm. You had an ECHO (ultrasound) of your heart and continued on your home medications, Eqliuis and Cardizem. Please continue these medications as prescribed.      Diagnosis: LATOYA (acute kidney injury)  Assessment and Plan of Treatment: You were admitted to the hospital for elevated kidney function. This was attributed to dehydration and diarrhea. You were started on IV fluids and your kidney function improved.   Please follow up with your primary care doctor within 1 week of discharge from the hospital to monitor your kidney function.       PRINCIPAL DISCHARGE DIAGNOSIS  Diagnosis: Sepsis, unspecified organism  Assessment and Plan of Treatment: You were admitted to the hospital with sepsis, a severe infection, likely caused by Pneumonia, which is an infection in your lungs. You were seen by an infectious disease specialist and started on IV antibiotics. You improved throughout your stay and were transitioned to oral antibiotics.   Upon discharge, please:  -START Ceftin twice daily for 4 more additional days, STARTING TOMORROW  Please follow up with your primary care doctor within 1 week of discharge from the hospital.  You or your family member are responsible for making all follow up appointments.  If you develop  fever, chills, or worsening shortness of breath, please return to the ED immediately.      SECONDARY DISCHARGE DIAGNOSES  Diagnosis: H/O traumatic brain injury  Assessment and Plan of Treatment: You were seen by a speech language pathologist to determine if your pneumonia was attibuted to difficulty swallowing as a result of your previous traumatic brain injury. You had a test done to determine what foods would be best to eat, and you were cleared for REGULAR diet with THIN liquids via small sips.  Please follow these instructions for eating, as advised by the speech language pathologist:  1. Regular and thin liquids, via single/small cup sips only, as tolerated  2. Slow pacing, single/small bites/sips, and alternate solids with liquids  3. Maintain upright position during all meals for 30 minutes  4. Allow for swallow between intakes; check mouth frequently for oral residue/pocketing; no straws; oral hygiene      Diagnosis: Imaging abnormality  Assessment and Plan of Treatment: On imaging of your abdomen, it was noted that your pancreas had some fullness/abnormality. You had repeat imaging of your pancreas including an MRI, which showed "2.0 x 1.2 cm structure that is slightly T2 hyperintense, and T1 hyperintense on precontrast fat-suppressed T1-weighted gradient echo images. This structure enhances similarly from that of the normal pancreatic parenchyma on subtraction images. Findings may represent normal pancreatic parenchyma with less interspersed fat, or may represent a nonspecific mass lesion. Correlation with tumor markers is recommended. If clinically indicated, endoscopic ultrasound may be pursued for further evaluation.+Small gallstones in the gallbladder. No evidence for thickened gallbladder wall or pericholecystic fluid. +9 mm left adrenal lesion as described above, suggestive of a small hemorrhage.Trace perisplenic ascites. Small right pleural effusion."  Please follow up with your primary care doctor within 1 week of discharge from the hospital to monitor and further evaluate.    Diagnosis: Atrial fibrillation with RVR  Assessment and Plan of Treatment: You have a history of atrial fibrillation, which is an abnormal heart rhythm. You had an ECHO (ultrasound) of your heart and continued on your home medications, Eqliuis and Cardizem. Please continue these medications as prescribed.      Diagnosis: LATOYA (acute kidney injury)  Assessment and Plan of Treatment: You were admitted to the hospital for elevated kidney function. This was attributed to dehydration and diarrhea. You were started on IV fluids and your kidney function improved.   Please follow up with your primary care doctor within 1 week of discharge from the hospital to monitor your kidney function.       PRINCIPAL DISCHARGE DIAGNOSIS  Diagnosis: Sepsis, unspecified organism  Assessment and Plan of Treatment: You were admitted to the hospital with sepsis, a severe infection, likely caused by  B/L Pneumonia, post COVID superimposed  which is an infection in your lungs. You were seen by an infectious disease specialist and started on IV antibiotics IV Cefepime & IV Vanco ---> change to PO Ceftin 2x day 1 more day..   -You improved throughout your stay and were transitioned to oral antibiotics.   Upon discharge, please:  -START Ceftin 500 mg 2x day  for 1 more days STARTING TOMORROW  -Please follow up with your primary care doctor within 1 week of discharge from the hospital.  -Aspiration Precaution  You or your family member are responsible for making all follow up appointments.  If you develop  fever, chills, or worsening shortness of breath, please return to the ED immediately.      SECONDARY DISCHARGE DIAGNOSES  Diagnosis: H/O traumatic brain injury  Assessment and Plan of Treatment: -On Muscle relaxant   -Gabapentin 300 mg TID  -You were seen by a speech language pathologist to determine if your pneumonia was attibuted to difficulty swallowing as a result of your previous traumatic brain injury. You had a test done to determine what foods would be best to eat, and you were cleared for REGULAR diet with THIN liquids via small sips.  Please follow these instructions for eating, as advised by the speech language pathologist:  1. Regular and thin liquids, via single/small cup sips only, as tolerated  2. Slow pacing, single/small bites/sips, and alternate solids with liquids  3. Maintain upright position during all meals for 30 minutes  4. Allow for swallow between intakes; check mouth frequently for oral residue/pocketing; no straws; oral hygiene      Diagnosis: Atrial fibrillation with RVR  Assessment and Plan of Treatment: You have a history of atrial fibrillation, which is an abnormal heart rhythm. You had an ECHO (ultrasound) of your heart and continued on your home medications, Eqliuis 2x day  and Cardizem  mg daily. Please continue these medications as prescribed.      Diagnosis: CAD (coronary artery disease)  Assessment and Plan of Treatment: On ASA & Statin    Diagnosis: Imaging abnormality  Assessment and Plan of Treatment: Pancreatic Mass-  On imaging of your abdomen, it was noted that your pancreas had some fullness/abnormality. You had repeat imaging of your pancreas including an MRI, which showed "2.0 x 1.2 cm structure that is slightly T2 hyperintense, and T1 hyperintense on precontrast fat-suppressed T1-weighted gradient echo images. This structure enhances similarly from that of the normal pancreatic parenchyma on subtraction images. Findings may represent normal pancreatic parenchyma with less interspersed fat, or may represent a nonspecific mass lesion. Correlation with tumor markers is recommended. If clinically indicated, endoscopic ultrasound may be pursued for further evaluation.+Small gallstones in the gallbladder. No evidence for thickened gallbladder wall or pericholecystic fluid. +9 mm left adrenal lesion as described above, suggestive of a small hemorrhage.Trace perisplenic ascites. Small right pleural effusion."  -Follow up with your PMD-GI for further work up for Tumor Markers & OR EUS Biopsy.  D/W wife at detail.  Please follow up with your primary care doctor within 1 week of discharge from the hospital to monitor and further evaluate.    Diagnosis: Hypertension  Assessment and Plan of Treatment: Losartan , Cardizem CD daily .    Diagnosis: History of BPH  Assessment and Plan of Treatment: On Flomax daily  Bethanechol 3x day    Diagnosis: LATOYA (acute kidney injury)  Assessment and Plan of Treatment: You were admitted to the hospital for elevated kidney function. This was attributed to dehydration and diarrhea. You were started on IV fluids and your kidney function improved. RESOLVED  Please follow up with your primary care doctor within 1 week of discharge from the hospital to monitor your kidney function.      Diagnosis: Mood disorder  Assessment and Plan of Treatment: On Bupropion 75 mg 2x day  Methylphenidate 20 mg 1 tab daily

## 2022-07-05 NOTE — SWALLOW VFSS/MBS ASSESSMENT ADULT - COMMENTS
Patient received in the Radiology Suite this AM for a Modified Barium Swallow Study, at which he was alert, cooperative, and denied pain. Patient is familiar to this department and was seen for a bedside swallow assessment on 7/1/22, at which time a regular and thin liquid diet level and an MBSS were recommended (see report for details).    Per charting, the patient is a "72 y.o male with a PMH of HTN, AFIB, BPH, bedbound 2/2 TBI, HLD, CAD, peripheral neuropathy, glaucoma, who presented to the ED with increasing cough and sweating. History taken via phone from wife, pt was not proving information at the moment of the exam. Pt was recently admitted from 6/17 to 6/23 for Covid infection and Afib with RVR. Wife reports that today pt started to have sweating, not feeling good, increased cough, 1 episode of diarrhea and vomiting, and she decided to brought him in. She states that he denied SOB, and his mental status was at baseline.  She also reports that after the discharge pt was doing in general good with persistent mild cough. He had nurse evaluation at home 2 days ago and was doing good and yesterday was seen by neuro with no acute concerns."    WBC WFL.   CXR 6/30/22 revealed "1. Bilateral lower lobe bronchopneumonia.  2. Uncinate pancreas acinar fullness compared to remainder of gland (2/85). The pancreas lesion is not excludable. Consider contrast-enhanced exam."    Discussed results and recommendations with the patient and called out to MD.

## 2022-07-05 NOTE — PROGRESS NOTE ADULT - SUBJECTIVE AND OBJECTIVE BOX
[facility], Division of Infectious Diseases  LADONNA Ramirez Y. Patel, S. Shah, G. [facility]  [phone]    Name: MELBA PARMAR  Age: 72y  Gender: Male  MRN: [id number]    Interval History:  Patient seen and examined at bedside  Eating lunch  No acute overnight events.   Notes reviewed    Antibiotics:  cefepime   IVPB 2000 milliGRAM(s) IV Intermittent every 12 hours  vancomycin  IVPB 1000 milliGRAM(s) IV Intermittent every 24 hours      Medications:  acetaminophen     Tablet .. 650 milliGRAM(s) Oral every 6 hours PRN  ALBUTerol    90 MICROgram(s) HFA Inhaler 1 Puff(s) Inhalation three times a day  ammonium lactate 12% Lotion 1 Application(s) Topical two times a day  apixaban 2.5 milliGRAM(s) Oral every 12 hours  aspirin  chewable 81 milliGRAM(s) Oral daily  atorvastatin 40 milliGRAM(s) Oral at bedtime  benzonatate 100 milliGRAM(s) Oral three times a day PRN  bethanechol 10 milliGRAM(s) Oral three times a day  buPROPion XL (24-Hour) . 150 milliGRAM(s) Oral daily  cefepime   IVPB 2000 milliGRAM(s) IV Intermittent every 12 hours  dextrose 5%. 1000 milliLiter(s) IV Continuous <Continuous>  diltiazem    milliGRAM(s) Oral daily  gabapentin 300 milliGRAM(s) Oral three times a day  latanoprost 0.005% Ophthalmic Solution 1 Drop(s) Both EYES at bedtime  losartan 25 milliGRAM(s) Oral daily  methylphenidate 20 milliGRAM(s) Oral <User Schedule>  mupirocin 2% Nasal 1 Application(s) Both Nostrils two times a day  senna 2 Tablet(s) Oral at bedtime  sodium chloride 0.65% Nasal 1 Spray(s) Both Nostrils four times a day PRN  tamsulosin 0.4 milliGRAM(s) Oral at bedtime  tiZANidine 4 milliGRAM(s) Oral three times a day  vancomycin  IVPB 1000 milliGRAM(s) IV Intermittent every 24 hours      Review of Systems:  A 10-point review of systems was obtained.     Review of systems otherwise negative except as previously noted.    Allergies: No Known Allergies    For details regarding the patient's past medical history, social history, family history, and other miscellaneous elements, please refer the initial infectious diseases consultation and/or the admitting history and physical examination for this admission.    Objective:  Vitals:   T(C): 37 (07-05-22 @ 12:55), Max: 37 (07-05-22 @ 12:55)  HR: 5 (07-05-22 @ 12:55) (5 - 82)  BP: 150/82 (07-05-22 @ 12:55) (128/82 - 150/94)  RR: 18 (07-05-22 @ 12:55) (18 - 18)  SpO2: 97% (07-05-22 @ 12:55) (95% - 97%)    Physical Examination:  General: no acute distress  HEENT: NC/AT, EOMI, anicteric, no oral lesions  Neck: supple, no palpable LAD  Cardio: S1, S2 heard, RRR, no murmurs  Resp: breath sounds heard bilaterally, no rales, wheezes or rhonchi  Abd: soft, NT, ND, + bowel sounds  Neuro: no obvious focal deficits  Ext: no edema or cyanosis  Skin: warm, dry, no visible rash      Laboratory Studies:  CBC:                       11.5   8.00  )-----------( 199      ( 05 Jul 2022 06:35 )             35.1     CMP: 07-05    143  |  109<H>  |  12  ----------------------------<  132<H>  4.3   |  29  |  0.94    Ca    8.9      05 Jul 2022 06:35            Microbiology: reviewed    Culture - Urine (collected 07-01-22 @ 05:41)  Source: Clean Catch Clean Catch (Midstream)  Final Report (07-02-22 @ 07:10):    <10,000 CFU/mL Normal Urogenital Angle    Culture - Blood (collected 06-30-22 @ 22:20)  Source: .Blood Blood-Peripheral  Preliminary Report (07-02-22 @ 06:00):    No growth to date.    Culture - Blood (collected 06-30-22 @ 22:15)  Source: .Blood Blood-Peripheral  Preliminary Report (07-02-22 @ 06:00):    No growth to date.        Radiology: reviewed

## 2022-07-05 NOTE — DISCHARGE NOTE PROVIDER - NSDCFUSCHEDAPPT_GEN_ALL_CORE_FT
Richard Mathis  [facility]  INTMED [address]  Scheduled Appointment: 08/04/2022    Mirta Goldsmith  [facility]  CARDIOLOGY [address] [facility]  Scheduled Appointment: 09/13/2022

## 2022-07-05 NOTE — PROGRESS NOTE ADULT - PROBLEM SELECTOR PLAN 3
- Chronic and stable   - Medication adjusted during the last admission   - Continue home Cardizem CD  120 with hold parameters.   - Losartan 25 qd restart - Chronic. RVR in the setting of fever and dehydration. -RESOLVED  - TTE 6/15/22: Mild LV hypertrophy, normal systolic function, eLVEF of 60%.Grossly normal RV.  LA enlargement. Trace to mild MR / Trace TR.  - Continue home Cardizem CD  120 qd with hold parameters.   - Continue home Eliquis 2.5 mg 2x day   - Monitor in tele: *******

## 2022-07-05 NOTE — DISCHARGE NOTE PROVIDER - NSDCFUADDINST_GEN_ALL_CORE_FT
Please follow up with your primary care doctor within 1 week of discharge from the hospital.  You or your family member are responsible for making all follow up appointments.  If you develop  fever, chills, or worsening shortness of breath, please return to ED.

## 2022-07-05 NOTE — DISCHARGE NOTE PROVIDER - NSDCMRMEDTOKEN_GEN_ALL_CORE_FT
aspirin 81 mg oral tablet: 1 tab(s) orally once a day  bethanechol 10 mg oral tablet: 1 tab(s) orally 3 times a day at (8am, 3pm, 10pm)  buPROPion 75 mg oral tablet: 1 tab(s) orally 2 times a day (10am, 10pm)  Cardizem  mg/24 hours oral capsule, extended release: 1 cap(s) orally once a day   Eliquis 2.5 mg oral tablet: 1 tab(s) orally 2 times a day (10am, 10pm)t  gabapentin 300 mg oral capsule: 1 cap(s) orally 3 times a day (8am, 3pm, 10pm  latanoprost 0.005% ophthalmic solution: 1 drop(s) to each affected eye once a day (in the evening)  losartan 25 mg oral tablet: 1 tab(s) orally once a day   methylphenidate 10 mg oral tablet: 2 tab(s) orally once a day (in the morning)  rosuvastatin 10 mg oral tablet: 1 tab(s) orally once a day  tamsulosin 0.4 mg oral capsule: 1 cap(s) orally once a day (in the evening)  tiZANidine 4 mg oral tablet: 1 tab(s) orally 3 times a day  Vitamin D3 25 mcg (1000 intl units) oral tablet: 1 tab(s) orally once a day   aspirin 81 mg oral tablet: 1 tab(s) orally once a day  bethanechol 10 mg oral tablet: 1 tab(s) orally 3 times a day at (8am, 3pm, 10pm)  buPROPion 75 mg oral tablet: 1 tab(s) orally 2 times a day (10am, 10pm)  Cardizem  mg/24 hours oral capsule, extended release: 1 cap(s) orally once a day   cefuroxime 500 mg oral tablet: 1 tab(s) orally 2 times a day   Eliquis 2.5 mg oral tablet: 1 tab(s) orally 2 times a day (10am, 10pm)t  gabapentin 300 mg oral capsule: 1 cap(s) orally 3 times a day (8am, 3pm, 10pm  latanoprost 0.005% ophthalmic solution: 1 drop(s) to each affected eye once a day (in the evening)  losartan 25 mg oral tablet: 1 tab(s) orally once a day   methylphenidate 10 mg oral tablet: 2 tab(s) orally once a day (in the morning)  rosuvastatin 10 mg oral tablet: 1 tab(s) orally once a day  tamsulosin 0.4 mg oral capsule: 1 cap(s) orally once a day (in the evening)  tiZANidine 4 mg oral tablet: 1 tab(s) orally 3 times a day  Vitamin D3 25 mcg (1000 intl units) oral tablet: 1 tab(s) orally once a day   ammonium lactate 12% topical lotion: 1 application topically 2 times a day  aspirin 81 mg oral tablet: 1 tab(s) orally once a day  bethanechol 10 mg oral tablet: 1 tab(s) orally 3 times a day at (8am, 3pm, 10pm)  buPROPion 75 mg oral tablet: 1 tab(s) orally 2 times a day (10am, 10pm)  Cardizem  mg/24 hours oral capsule, extended release: 1 cap(s) orally once a day   cefuroxime 500 mg oral tablet: 1 tab(s) orally every 12 hours  for 1 more Day  Eliquis 2.5 mg oral tablet: 1 tab(s) orally 2 times a day (10am, 10pm)t  gabapentin 300 mg oral capsule: 1 cap(s) orally 3 times a day (8am, 3pm, 10pm  latanoprost 0.005% ophthalmic solution: 1 drop(s) to each affected eye once a day (in the evening)  losartan 25 mg oral tablet: 1 tab(s) orally once a day   methylphenidate 10 mg oral tablet: 2 tab(s) orally once a day (in the morning)  rosuvastatin 10 mg oral tablet: 1 tab(s) orally once a day  senna leaf extract oral tablet: 2 tab(s) orally once a day (at bedtime)  sodium chloride 0.65% nasal spray: 2 spray(s) nasal 4 times a day  tamsulosin 0.4 mg oral capsule: 1 cap(s) orally once a day (in the evening)  tiZANidine 4 mg oral tablet: 1 tab(s) orally 3 times a day  Vitamin D3 25 mcg (1000 intl units) oral tablet: 1 tab(s) orally once a day

## 2022-07-05 NOTE — DISCHARGE NOTE NURSING/CASE MANAGEMENT/SOCIAL WORK - NSDCPEFALRISK_GEN_ALL_CORE
For information on Fall & Injury Prevention, visit: [email or web address] OR  [email or web address] OR  https://www.cdc.gov/steadi/patient.html

## 2022-07-05 NOTE — PROGRESS NOTE ADULT - PROBLEM SELECTOR PLAN 5
- pt is bed and wheelchair bound, severe persisting peripheral neuropathy 2/2 prior traumatic brain bleed on Eliquis.   - Fall, aspiration, safety precautions  - At home on gabapentin 300 mg TID, buproprion, tizanidine 4 mg TID, and ritalin qd. Continue home meds.   - Bedside PT  Pt Chronically bed and wheelchair bound which predisposes him for atelectasis and superinfection s/p Covid. - As per wife no acute/new chest pain   - Normal troponin  - Continue home statin - pharm interchange for rosuvastatin, and ASA   - Monitor in tele.

## 2022-07-05 NOTE — DISCHARGE NOTE NURSING/CASE MANAGEMENT/SOCIAL WORK - NSPROEXTENSIONSOFSELF_GEN_A_NUR
Patient is here alone today.    If any information or results need to be relayed from today's visit, the best way to contact the patient is via [phone] (mobile) - Patient gives verbal permission to leave a detailed voicemail at the number provided.         eyeglasses

## 2022-07-05 NOTE — PROGRESS NOTE ADULT - SUBJECTIVE AND OBJECTIVE BOX
Patient is a 72y old  Male who presents with a chief complaint of Sepsis     (04 Jul 2022 11:30)    HPI:  Pt is a 72 y.o male with a PMH of HTN, AFIB, BPH, bedbound 2/2 TBI, HLD, CAD, peripheral neuropathy, glaucoma, who presented to the ED with increasing cough and sweating. History taken via phone from wife, pt was not proving information at the moment of the exam. Pt was recently admitted from 6/17 to 6/23 for Covid infection and Afib with RVR. Wife reports that today pt started to have sweating, not feeling good, increased cough, 1 episode of diarrhea and vomiting, and she decided to brought him in. She states that he denied SOB, and his mental status was at baseline.  She also reports that after the discharge pt was doing in general good with persistent mild cough. He had nurse evaluation at home 2 days ago and was doing good and yesterday was seen by neuro with no acute concerns.     ED course:   - Vitals: 144/93, , RR 16, temp 98.2 < 101.8, O2 sat 95% with NC 4lt   - Labs: WBC 27.28, N 93%. Hb 15.2. INR 1.37. Na 146.  BUN 21/Cr 1.3.  Glucose 162.  Lactate 2.6  - CT CAP: Mild-to-moderate patchy airspace disease or consolidation both lower lobes. Mild airspace disease and/or atelectasis at the lingula.  (It is a preliminary result)  - EKG: Afib , right axis deviation, RV hypertrophy septal and inferior infarct age undeterm.   - s/p Zosyn and vanco x1 dose, NS 2lt bolus, Tylenol suppository, Zofran IV x1.  (01 Jul 2022 00:35)    INTERVAL HPI:  ________  TODAY- Pt was seen and examined at bedside. Pt states that ___. Pt denies headache, dizziness, lightheadedness, fever, chills, body aches, CP, SOB, palpitations, abdominal pain, n/v, numbness/tingling. No other complaints at this time.     OVERNIGHT EVENTS: No acute overnight events.     Home Medications:  aspirin 81 mg oral tablet: 1 tab(s) orally once a day (01 Jul 2022 02:13)  bethanechol 10 mg oral tablet: 1 tab(s) orally 3 times a day at (8am, 3pm, 10pm) (01 Jul 2022 02:13)  buPROPion 75 mg oral tablet: 1 tab(s) orally 2 times a day (10am, 10pm) (01 Jul 2022 02:13)  Eliquis 2.5 mg oral tablet: 1 tab(s) orally 2 times a day (10am, 10pm)t (01 Jul 2022 02:13)  gabapentin 300 mg oral capsule: 1 cap(s) orally 3 times a day (8am, 3pm, 10pm (01 Jul 2022 02:13)  latanoprost 0.005% ophthalmic solution: 1 drop(s) to each affected eye once a day (in the evening) (01 Jul 2022 02:13)  methylphenidate 10 mg oral tablet: 2 tab(s) orally once a day (in the morning) (01 Jul 2022 02:13)  rosuvastatin 10 mg oral tablet: 1 tab(s) orally once a day (01 Jul 2022 02:13)  tamsulosin 0.4 mg oral capsule: 1 cap(s) orally once a day (in the evening) (01 Jul 2022 02:13)  tiZANidine 4 mg oral tablet: 1 tab(s) orally 3 times a day (01 Jul 2022 02:13)  Vitamin D3 25 mcg (1000 intl units) oral tablet: 1 tab(s) orally once a day (01 Jul 2022 02:13)      MEDICATIONS  (STANDING):  ALBUTerol    90 MICROgram(s) HFA Inhaler 1 Puff(s) Inhalation three times a day  ammonium lactate 12% Lotion 1 Application(s) Topical two times a day  apixaban 2.5 milliGRAM(s) Oral every 12 hours  aspirin  chewable 81 milliGRAM(s) Oral daily  atorvastatin 40 milliGRAM(s) Oral at bedtime  bethanechol 10 milliGRAM(s) Oral three times a day  buPROPion XL (24-Hour) . 150 milliGRAM(s) Oral daily  cefepime   IVPB 2000 milliGRAM(s) IV Intermittent every 12 hours  dextrose 5%. 1000 milliLiter(s) (50 mL/Hr) IV Continuous <Continuous>  diltiazem    milliGRAM(s) Oral daily  gabapentin 300 milliGRAM(s) Oral three times a day  latanoprost 0.005% Ophthalmic Solution 1 Drop(s) Both EYES at bedtime  losartan 25 milliGRAM(s) Oral daily  methylphenidate 20 milliGRAM(s) Oral <User Schedule>  mupirocin 2% Nasal 1 Application(s) Both Nostrils two times a day  senna 2 Tablet(s) Oral at bedtime  tamsulosin 0.4 milliGRAM(s) Oral at bedtime  tiZANidine 4 milliGRAM(s) Oral three times a day  vancomycin  IVPB 1000 milliGRAM(s) IV Intermittent every 24 hours    MEDICATIONS  (PRN):  acetaminophen     Tablet .. 650 milliGRAM(s) Oral every 6 hours PRN Temp greater or equal to 38C (100.4F), Mild Pain (1 - 3)  benzonatate 100 milliGRAM(s) Oral three times a day PRN Cough  sodium chloride 0.65% Nasal 1 Spray(s) Both Nostrils four times a day PRN Nasal Congestion      No Known Allergies      Social History:  Lives with wife.  Pt is bedbound and wheelchair bound. Pt is reliant on wife for ADLs.   No h/o alcohol, tobacco, or recreational drug use  COVID vaccinated x3, last in february with Moderna. (01 Jul 2022 00:35)      REVIEW OF SYSTEMS:  CONSTITUTIONAL: No fever, No chills, No fatigue, No myalgia, No Body ache, No Weakness  EYES: No eye pain,  No visual disturbances, No discharge, No Redness  ENMT: No ear pain, No nose bleed, No vertigo; No sinus pain, No throat pain, No Congestion  NECK: No pain, No stiffness  RESPIRATORY: No cough, No wheezing, No hemoptysis, No shortness of breath  CARDIOVASCULAR: No chest pain, No palpitations  GASTROINTESTINAL: No abdominal pain, No epigastric pain. No nausea, No vomiting, No diarrhea, No constipation; [ x ] BM-  GENITOURINARY: No dysuria, No frequency, No urgency, No hematuria, No incontinence  NEUROLOGICAL: No headaches, No dizziness, No numbness, No tingling, No tremors, No weakness  EXTREMITIES: No Swelling, No Pain, No Edema  SKIN: [ x ] No itching, burning, rashes, or lesions   MUSCULOSKELETAL: No joint pain, No joint swelling; No muscle pain, No back pain, No extremity pain  PSYCHIATRIC: No depression, No anxiety, No mood swings, No difficulty sleeping at night  PAIN SCALE: [  ] None  [  ] Other-  ROS Unable to obtain due to: [  ] Dementia  [  ] Lethargy  [  ] Sedated  [  ] Non verbal  REST OF REVIEW OF SYSTEMS: [ x ] Normal     Vital Signs Last 24 Hrs  T(C): 36.5 (05 Jul 2022 04:00), Max: 37.1 (04 Jul 2022 11:10)  T(F): 97.7 (05 Jul 2022 04:00), Max: 98.7 (04 Jul 2022 11:10)  HR: 70 (05 Jul 2022 04:00) (70 - 82)  BP: 128/82 (05 Jul 2022 04:00) (128/82 - 150/94)  BP(mean): --  RR: 18 (05 Jul 2022 04:00) (18 - 19)  SpO2: 95% (05 Jul 2022 04:00) (94% - 95%)    CAPILLARY BLOOD GLUCOSE          I&O's Summary    04 Jul 2022 07:01  -  05 Jul 2022 07:00  --------------------------------------------------------  IN: 0 mL / OUT: 1050 mL / NET: -1050 mL      PHYSICAL EXAM:  GENERAL:  [ x ] NAD, [ x ] Well appearing, [  ] Agitated, [  ] Drowsy, [  ] Lethargy, [  ] Confused   HEAD:  [ x ] Normal, [  ] Other  EYES:  [ x ] EOMI, [ x ] PERRLA, [ x ] Conjunctiva and sclera clear normal, [  ] Other, [  ] Pallor, [  ] Discharge  ENMT:  [ x ] Normal, [ x ] Moist mucous membranes, [  ] Good dentition, [  ] No thrush  NECK:  [ x ] Supple, [  ] No JVD, [ x ] Normal thyroid, [  ] Lymphadenopathy, [  ] Other  CHEST/LUNG:  [ x ] Clear to auscultation bilaterally, [ x ] Breath Sounds equal B/L / decreased, [  ] Poor effort, [ x ] No rales, [ x ] No rhonchi, [ x ] No wheezing  HEART:  [ x ] Regular rate and rhythm, [  ] Tachycardia, [  ] Bradycardia, [  ] Irregular, [ x ] No murmurs, No rubs, No gallops, [  ] PPM in place (Mfr:  )  ABDOMEN:  [ x ] Soft, [ x ] Nontender, [ x ] Nondistended, [ x ] No mass, [ x ] Bowel sounds present, [  ] Obese  NERVOUS SYSTEM:  [ x ] Alert & Oriented x3__, [ x ] Nonfocal, [  ] Confusion, [  ] Encephalopathic, [  ] Sedated, [  ] Unable to assess, [  ] Dementia, [  ] Other-  EXTREMITIES:  [ x ] 2+ Peripheral Pulses, No clubbing, No cyanosis,  [  ] Edema B/L lower EXT, [  ] PVD stasis skin changes B/L lower EXT, [  ] Wound  LYMPH:  No lymphadenopathy noted  SKIN:  [ x ] No rashes or lesions, [  ] Pressure ulcers, [  ] Ecchymosis, [  ] Skin tears, [  ] Other    DIET: Diet, Regular (07-01-22 @ 16:25)      LABS:      Ca    8.7        04 Jul 2022 06:18          Culture Results:   <10,000 CFU/mL Normal Urogenital Angle (07-01 @ 05:41)  Culture Results:   No growth to date. (06-30 @ 22:20)  Culture Results:   No growth to date. (06-30 @ 22:15)            Culture - Urine (collected 01 Jul 2022 05:41)  Source: Clean Catch Clean Catch (Midstream)  Final Report (02 Jul 2022 07:10):    <10,000 CFU/mL Normal Urogenital Angle    Culture - Blood (collected 30 Jun 2022 22:20)  Source: .Blood Blood-Peripheral  Preliminary Report (02 Jul 2022 06:00):    No growth to date.    Culture - Blood (collected 30 Jun 2022 22:15)  Source: .Blood Blood-Peripheral  Preliminary Report (02 Jul 2022 06:00):    No growth to date.       Anemia Panel:      Thyroid Panel:                RADIOLOGY & ADDITIONAL TESTS: _______      HEALTH ISSUES - PROBLEM Dx:  Sepsis due to pneumonia    Atrial fibrillation with RVR    Hypertension    Glaucoma    LATOYA (acute kidney injury)    Need for prophylactic measure    History of BPH    H/O traumatic brain injury    Chronic major depressive disorder    CAD (coronary artery disease)          Consultant(s) Notes Reviewed:  [ x ] YES     Care Discussed with [ x ] Consultants, [ x ] Patient, [ x ] Family, [  ] HCP, [ x ] , [  ] Social Service, [ x ] RN, [  ] Physical Therapy, [  ] Palliative Care Team  DVT PPX: [  ] Lovenox, [  ] SC Heparin, [  ] Coumadin, [  ] Xarelto, [  ] Eliquis, [  ] Pradaxa, [  ] IV Heparin drip, [  ] SCD, [  ] Ambulation, [  ] Contraindicated 2/2 GI Bleed, [  ] Contraindicated 2/2  Bleed, [  ] Contraindicated 2/2 Brain Bleed  Advanced Directive: [  ] None, [  ] DNR/DNI Patient is a 72y old  Male who presents with a chief complaint of Sepsis     (04 Jul 2022 11:30)    HPI:  Pt is a 72 y.o male with a PMH of HTN, AFIB, BPH, bedbound 2/2 TBI, HLD, CAD, peripheral neuropathy, glaucoma, who presented to the ED with increasing cough and sweating. History taken via phone from wife, pt was not proving information at the moment of the exam. Pt was recently admitted from 6/17 to 6/23 for Covid infection and Afib with RVR. Wife reports that today pt started to have sweating, not feeling good, increased cough, 1 episode of diarrhea and vomiting, and she decided to brought him in. She states that he denied SOB, and his mental status was at baseline.  She also reports that after the discharge pt was doing in general good with persistent mild cough. He had nurse evaluation at home 2 days ago and was doing good and yesterday was seen by neuro with no acute concerns.     ED course:   - Vitals: 144/93, , RR 16, temp 98.2 < 101.8, O2 sat 95% with NC 4lt   - Labs: WBC 27.28, N 93%. Hb 15.2. INR 1.37. Na 146.  BUN 21/Cr 1.3.  Glucose 162.  Lactate 2.6  - CT CAP: Mild-to-moderate patchy airspace disease or consolidation both lower lobes. Mild airspace disease and/or atelectasis at the lingula.  (It is a preliminary result)  - EKG: Afib , right axis deviation, RV hypertrophy septal and inferior infarct age undeterm.   - s/p Zosyn and vanco x1 dose, NS 2lt bolus, Tylenol suppository, Zofran IV x1.  (01 Jul 2022 00:35)    INTERVAL HPI:  7/1/22: pt seen, Examined, feels much better, on IV cefepime & Vanco, WBC elevated, seen by ID, No complaints  7/2/22: Pt seen, examined. feels the same as yesterday. Endorses a productive cough, but only complaint is that the food is not good. No complaints at this time. Continuing with IV cefepime & vanco., WBC -Nl , Mild Anemia ,  7/3/22: Pt seen and examined at bedside. He states he feels well, has a good appetite but would be even better if the food was good. No complaints at this time. Continue IV cefepime & vanco. WBC wnl.  7/4/22:Pt seen, examined, at bedside, WBC stable ,On IV Abx ,Off Isolation. MBS plan in AM , D/C Plan   7/5/22- Pt seen and examined at bedside.    OVERNIGHT EVENTS: No acute overnight events.     Home Medications:  aspirin 81 mg oral tablet: 1 tab(s) orally once a day (01 Jul 2022 02:13)  bethanechol 10 mg oral tablet: 1 tab(s) orally 3 times a day at (8am, 3pm, 10pm) (01 Jul 2022 02:13)  buPROPion 75 mg oral tablet: 1 tab(s) orally 2 times a day (10am, 10pm) (01 Jul 2022 02:13)  Eliquis 2.5 mg oral tablet: 1 tab(s) orally 2 times a day (10am, 10pm)t (01 Jul 2022 02:13)  gabapentin 300 mg oral capsule: 1 cap(s) orally 3 times a day (8am, 3pm, 10pm (01 Jul 2022 02:13)  latanoprost 0.005% ophthalmic solution: 1 drop(s) to each affected eye once a day (in the evening) (01 Jul 2022 02:13)  methylphenidate 10 mg oral tablet: 2 tab(s) orally once a day (in the morning) (01 Jul 2022 02:13)  rosuvastatin 10 mg oral tablet: 1 tab(s) orally once a day (01 Jul 2022 02:13)  tamsulosin 0.4 mg oral capsule: 1 cap(s) orally once a day (in the evening) (01 Jul 2022 02:13)  tiZANidine 4 mg oral tablet: 1 tab(s) orally 3 times a day (01 Jul 2022 02:13)  Vitamin D3 25 mcg (1000 intl units) oral tablet: 1 tab(s) orally once a day (01 Jul 2022 02:13)      MEDICATIONS  (STANDING):  ALBUTerol    90 MICROgram(s) HFA Inhaler 1 Puff(s) Inhalation three times a day  ammonium lactate 12% Lotion 1 Application(s) Topical two times a day  apixaban 2.5 milliGRAM(s) Oral every 12 hours  aspirin  chewable 81 milliGRAM(s) Oral daily  atorvastatin 40 milliGRAM(s) Oral at bedtime  bethanechol 10 milliGRAM(s) Oral three times a day  buPROPion XL (24-Hour) . 150 milliGRAM(s) Oral daily  cefepime   IVPB 2000 milliGRAM(s) IV Intermittent every 12 hours  dextrose 5%. 1000 milliLiter(s) (50 mL/Hr) IV Continuous <Continuous>  diltiazem    milliGRAM(s) Oral daily  gabapentin 300 milliGRAM(s) Oral three times a day  latanoprost 0.005% Ophthalmic Solution 1 Drop(s) Both EYES at bedtime  losartan 25 milliGRAM(s) Oral daily  methylphenidate 20 milliGRAM(s) Oral <User Schedule>  mupirocin 2% Nasal 1 Application(s) Both Nostrils two times a day  senna 2 Tablet(s) Oral at bedtime  tamsulosin 0.4 milliGRAM(s) Oral at bedtime  tiZANidine 4 milliGRAM(s) Oral three times a day  vancomycin  IVPB 1000 milliGRAM(s) IV Intermittent every 24 hours    MEDICATIONS  (PRN):  acetaminophen     Tablet .. 650 milliGRAM(s) Oral every 6 hours PRN Temp greater or equal to 38C (100.4F), Mild Pain (1 - 3)  benzonatate 100 milliGRAM(s) Oral three times a day PRN Cough  sodium chloride 0.65% Nasal 1 Spray(s) Both Nostrils four times a day PRN Nasal Congestion      No Known Allergies      Social History:  Lives with wife.  Pt is bedbound and wheelchair bound. Pt is reliant on wife for ADLs.   No h/o alcohol, tobacco, or recreational drug use  COVID vaccinated x3, last in february with Moderna. (01 Jul 2022 00:35)      REVIEW OF SYSTEMS:  CONSTITUTIONAL: No fever, No chills, No fatigue, No myalgia, No Body ache, No Weakness  EYES: No eye pain,  No visual disturbances, No discharge, No Redness  ENMT: No ear pain, No nose bleed, No vertigo; No sinus pain, No throat pain, No Congestion  NECK: No pain, No stiffness  RESPIRATORY: No cough, No wheezing, No hemoptysis, No shortness of breath  CARDIOVASCULAR: No chest pain, No palpitations  GASTROINTESTINAL: No abdominal pain, No epigastric pain. No nausea, No vomiting, No diarrhea, No constipation; [ x ] BM-  GENITOURINARY: No dysuria, No frequency, No urgency, No hematuria, No incontinence  NEUROLOGICAL: No headaches, No dizziness, No numbness, No tingling, No tremors, No weakness  EXTREMITIES: No Swelling, No Pain, No Edema  SKIN: [ x ] No itching, burning, rashes, or lesions   MUSCULOSKELETAL: No joint pain, No joint swelling; No muscle pain, No back pain, No extremity pain  PSYCHIATRIC: No depression, No anxiety, No mood swings, No difficulty sleeping at night  PAIN SCALE: [  ] None  [  ] Other-  ROS Unable to obtain due to: [  ] Dementia  [  ] Lethargy  [  ] Sedated  [  ] Non verbal  REST OF REVIEW OF SYSTEMS: [ x ] Normal     Vital Signs Last 24 Hrs  T(C): 36.5 (05 Jul 2022 04:00), Max: 37.1 (04 Jul 2022 11:10)  T(F): 97.7 (05 Jul 2022 04:00), Max: 98.7 (04 Jul 2022 11:10)  HR: 70 (05 Jul 2022 04:00) (70 - 82)  BP: 128/82 (05 Jul 2022 04:00) (128/82 - 150/94)  BP(mean): --  RR: 18 (05 Jul 2022 04:00) (18 - 19)  SpO2: 95% (05 Jul 2022 04:00) (94% - 95%)    CAPILLARY BLOOD GLUCOSE          I&O's Summary    04 Jul 2022 07:01  -  05 Jul 2022 07:00  --------------------------------------------------------  IN: 0 mL / OUT: 1050 mL / NET: -1050 mL      PHYSICAL EXAM:  GENERAL:  [ x ] NAD, [ x ] Well appearing, [  ] Agitated, [  ] Drowsy, [  ] Lethargy, [  ] Confused   HEAD:  [ x ] Normal, [  ] Other  EYES:  [ x ] EOMI, [ x ] PERRLA, [ x ] Conjunctiva and sclera clear normal, [  ] Other, [  ] Pallor, [  ] Discharge  ENMT:  [ x ] Normal, [ x ] Moist mucous membranes, [  ] Good dentition, [  ] No thrush  NECK:  [ x ] Supple, [  ] No JVD, [ x ] Normal thyroid, [  ] Lymphadenopathy, [  ] Other  CHEST/LUNG:  [ x ] Clear to auscultation bilaterally, [ x ] Breath Sounds equal B/L / decreased, [  ] Poor effort, [ x ] No rales, [ x ] No rhonchi, [ x ] No wheezing  HEART:  [ x ] Regular rate and rhythm, [  ] Tachycardia, [  ] Bradycardia, [  ] Irregular, [ x ] No murmurs, No rubs, No gallops, [  ] PPM in place (Mfr:  )  ABDOMEN:  [ x ] Soft, [ x ] Nontender, [ x ] Nondistended, [ x ] No mass, [ x ] Bowel sounds present, [  ] Obese  NERVOUS SYSTEM:  [ x ] Alert & Oriented x3__, [ x ] Nonfocal, [  ] Confusion, [  ] Encephalopathic, [  ] Sedated, [  ] Unable to assess, [  ] Dementia, [  ] Other-  EXTREMITIES:  [ x ] 2+ Peripheral Pulses, No clubbing, No cyanosis,  [  ] Edema B/L lower EXT, [  ] PVD stasis skin changes B/L lower EXT, [  ] Wound  LYMPH:  No lymphadenopathy noted  SKIN:  [ x ] No rashes or lesions, [  ] Pressure ulcers, [  ] Ecchymosis, [  ] Skin tears, [  ] Other    DIET: Diet, Regular (07-01-22 @ 16:25)      LABS:      Ca    8.7        04 Jul 2022 06:18          Culture Results:   <10,000 CFU/mL Normal Urogenital Angle (07-01 @ 05:41)  Culture Results:   No growth to date. (06-30 @ 22:20)  Culture Results:   No growth to date. (06-30 @ 22:15)            Culture - Urine (collected 01 Jul 2022 05:41)  Source: Clean Catch Clean Catch (Midstream)  Final Report (02 Jul 2022 07:10):    <10,000 CFU/mL Normal Urogenital Angle    Culture - Blood (collected 30 Jun 2022 22:20)  Source: .Blood Blood-Peripheral  Preliminary Report (02 Jul 2022 06:00):    No growth to date.    Culture - Blood (collected 30 Jun 2022 22:15)  Source: .Blood Blood-Peripheral  Preliminary Report (02 Jul 2022 06:00):    No growth to date.       Anemia Panel:      Thyroid Panel:                RADIOLOGY & ADDITIONAL TESTS: _______      HEALTH ISSUES - PROBLEM Dx:  Sepsis due to pneumonia    Atrial fibrillation with RVR    Hypertension    Glaucoma    LATOYA (acute kidney injury)    Need for prophylactic measure    History of BPH    H/O traumatic brain injury    Chronic major depressive disorder    CAD (coronary artery disease)          Consultant(s) Notes Reviewed:  [ x ] YES     Care Discussed with [ x ] Consultants, [ x ] Patient, [ x ] Family, [  ] HCP, [ x ] , [  ] Social Service, [ x ] RN, [  ] Physical Therapy, [  ] Palliative Care Team  DVT PPX: [  ] Lovenox, [  ] SC Heparin, [  ] Coumadin, [  ] Xarelto, [  ] Eliquis, [  ] Pradaxa, [  ] IV Heparin drip, [  ] SCD, [  ] Ambulation, [  ] Contraindicated 2/2 GI Bleed, [  ] Contraindicated 2/2  Bleed, [  ] Contraindicated 2/2 Brain Bleed  Advanced Directive: [  ] None, [  ] DNR/DNI Patient is a 72y old  Male who presents with a chief complaint of Sepsis     (04 Jul 2022 11:30)    HPI:  Pt is a 72 y.o male with a PMH of HTN, AFIB, BPH, bedbound 2/2 TBI, HLD, CAD, peripheral neuropathy, glaucoma, who presented to the ED with increasing cough and sweating. History taken via phone from wife, pt was not proving information at the moment of the exam. Pt was recently admitted from 6/17 to 6/23 for Covid infection and Afib with RVR. Wife reports that today pt started to have sweating, not feeling good, increased cough, 1 episode of diarrhea and vomiting, and she decided to brought him in. She states that he denied SOB, and his mental status was at baseline.  She also reports that after the discharge pt was doing in general good with persistent mild cough. He had nurse evaluation at home 2 days ago and was doing good and yesterday was seen by neuro with no acute concerns.     ED course:   - Vitals: 144/93, , RR 16, temp 98.2 < 101.8, O2 sat 95% with NC 4lt   - Labs: WBC 27.28, N 93%. Hb 15.2. INR 1.37. Na 146.  BUN 21/Cr 1.3.  Glucose 162.  Lactate 2.6  - CT CAP: Mild-to-moderate patchy airspace disease or consolidation both lower lobes. Mild airspace disease and/or atelectasis at the lingula.  (It is a preliminary result)  - EKG: Afib , right axis deviation, RV hypertrophy septal and inferior infarct age undeterm.   - s/p Zosyn and vanco x1 dose, NS 2lt bolus, Tylenol suppository, Zofran IV x1.  (01 Jul 2022 00:35)    INTERVAL HPI:  7/1/22: pt seen, Examined, feels much better, on IV cefepime & Vanco, WBC elevated, seen by ID, No complaints  7/2/22: Pt seen, examined. feels the same as yesterday. Endorses a productive cough, but only complaint is that the food is not good. No complaints at this time. Continuing with IV cefepime & vanco., WBC -Nl , Mild Anemia ,  7/3/22: Pt seen and examined at bedside. He states he feels well, has a good appetite but would be even better if the food was good. No complaints at this time. Continue IV cefepime & vanco. WBC wnl.  7/4/22:Pt seen, examined, at bedside, WBC stable ,On IV Abx ,Off Isolation. MBS plan in AM , D/C Plan   7/5/22- Pt seen and examined at bedside.    OVERNIGHT EVENTS: No acute overnight events.     Home Medications:  aspirin 81 mg oral tablet: 1 tab(s) orally once a day (01 Jul 2022 02:13)  bethanechol 10 mg oral tablet: 1 tab(s) orally 3 times a day at (8am, 3pm, 10pm) (01 Jul 2022 02:13)  buPROPion 75 mg oral tablet: 1 tab(s) orally 2 times a day (10am, 10pm) (01 Jul 2022 02:13)  Eliquis 2.5 mg oral tablet: 1 tab(s) orally 2 times a day (10am, 10pm)t (01 Jul 2022 02:13)  gabapentin 300 mg oral capsule: 1 cap(s) orally 3 times a day (8am, 3pm, 10pm (01 Jul 2022 02:13)  latanoprost 0.005% ophthalmic solution: 1 drop(s) to each affected eye once a day (in the evening) (01 Jul 2022 02:13)  methylphenidate 10 mg oral tablet: 2 tab(s) orally once a day (in the morning) (01 Jul 2022 02:13)  rosuvastatin 10 mg oral tablet: 1 tab(s) orally once a day (01 Jul 2022 02:13)  tamsulosin 0.4 mg oral capsule: 1 cap(s) orally once a day (in the evening) (01 Jul 2022 02:13)  tiZANidine 4 mg oral tablet: 1 tab(s) orally 3 times a day (01 Jul 2022 02:13)  Vitamin D3 25 mcg (1000 intl units) oral tablet: 1 tab(s) orally once a day (01 Jul 2022 02:13)      MEDICATIONS  (STANDING):  ALBUTerol    90 MICROgram(s) HFA Inhaler 1 Puff(s) Inhalation three times a day  ammonium lactate 12% Lotion 1 Application(s) Topical two times a day  apixaban 2.5 milliGRAM(s) Oral every 12 hours  aspirin  chewable 81 milliGRAM(s) Oral daily  atorvastatin 40 milliGRAM(s) Oral at bedtime  bethanechol 10 milliGRAM(s) Oral three times a day  buPROPion XL (24-Hour) . 150 milliGRAM(s) Oral daily  cefepime   IVPB 2000 milliGRAM(s) IV Intermittent every 12 hours  dextrose 5%. 1000 milliLiter(s) (50 mL/Hr) IV Continuous <Continuous>  diltiazem    milliGRAM(s) Oral daily  gabapentin 300 milliGRAM(s) Oral three times a day  latanoprost 0.005% Ophthalmic Solution 1 Drop(s) Both EYES at bedtime  losartan 25 milliGRAM(s) Oral daily  methylphenidate 20 milliGRAM(s) Oral <User Schedule>  mupirocin 2% Nasal 1 Application(s) Both Nostrils two times a day  senna 2 Tablet(s) Oral at bedtime  tamsulosin 0.4 milliGRAM(s) Oral at bedtime  tiZANidine 4 milliGRAM(s) Oral three times a day  vancomycin  IVPB 1000 milliGRAM(s) IV Intermittent every 24 hours    MEDICATIONS  (PRN):  acetaminophen     Tablet .. 650 milliGRAM(s) Oral every 6 hours PRN Temp greater or equal to 38C (100.4F), Mild Pain (1 - 3)  benzonatate 100 milliGRAM(s) Oral three times a day PRN Cough  sodium chloride 0.65% Nasal 1 Spray(s) Both Nostrils four times a day PRN Nasal Congestion      No Known Allergies      Social History:  Lives with wife.  Pt is bedbound and wheelchair bound. Pt is reliant on wife for ADLs.   No h/o alcohol, tobacco, or recreational drug use  COVID vaccinated x3, last in february with Moderna. (01 Jul 2022 00:35)      REVIEW OF SYSTEMS: i am OK  CONSTITUTIONAL: No fever, No chills, No fatigue, No myalgia, No Body ache, No Weakness  EYES: No eye pain,  No visual disturbances, No discharge, NO Redness  ENMT:  No ear pain, No nose bleed, No vertigo; No sinus pain, NO throat pain, No Congestion  NECK: No pain, No stiffness  RESPIRATORY: + productive cough, NO wheezing, No  hemoptysis, NO shortness of breath  CARDIOVASCULAR: No chest pain, palpitations  GASTROINTESTINAL: No abdominal pain, NO epigastric pain. No nausea, No vomiting; No diarrhea, No constipation. [  ] BM  GENITOURINARY: No dysuria, No frequency, No urgency, No hematuria, NO incontinence  NEUROLOGICAL: No headaches, No dizziness, No numbness, No tingling, No tremors, No weakness  EXT: + neuropathic Pain b/l feet, No Swelling, No Edema  SKIN:  [x  ] No itching, burning, rashes, or lesions   MUSCULOSKELETAL: No joint pain ,No Jt swelling; No muscle pain, No back pain, No extremity pain  PSYCHIATRIC: No depression,  No anxiety,  No mood swings ,No difficulty sleeping at night  PAIN SCALE: [ x ] None  [  ] Other-  ROS Unable to obtain due to - [  ] Dementia  [  ] Lethargy [  ] Drowsy [  ] Sedated [  ] non verbal  REST OF REVIEW Of SYSTEM - [x  ] Normal     Vital Signs Last 24 Hrs  T(C): 36.5 (05 Jul 2022 04:00), Max: 37.1 (04 Jul 2022 11:10)  T(F): 97.7 (05 Jul 2022 04:00), Max: 98.7 (04 Jul 2022 11:10)  HR: 70 (05 Jul 2022 04:00) (70 - 82)  BP: 128/82 (05 Jul 2022 04:00) (128/82 - 150/94)  BP(mean): --  RR: 18 (05 Jul 2022 04:00) (18 - 19)  SpO2: 95% (05 Jul 2022 04:00) (94% - 95%)    CAPILLARY BLOOD GLUCOSE          I&O's Summary    04 Jul 2022 07:01  -  05 Jul 2022 07:00  --------------------------------------------------------  IN: 0 mL / OUT: 1050 mL / NET: -1050 mL      PHYSICAL EXAM:  GENERAL:  [ X ] NAD, [X  ] Well appearing, [  ] Agitated, [  ] Drowsy, [  ] Lethargy, [  ] Confused   HEAD:  [X  ] Normal, [  ] Other  EYES:  [X  ] EOMI, [X  ] PERRLA, [X  ] Conjunctiva and sclera clear normal, [  ] Other, [  ] Pallor, [  ] Discharge  ENMT:  [ X ] Normal, [x  ] Moist mucous membranes, [ x ] Good dentition, [ x ] No thrush  NECK:  [X  ] Supple, [ x ] No JVD, [ x ] Normal thyroid, [  ] Lymphadenopathy, [  ] Other  CHEST/LUNG:  [x  ] Clear to auscultation bilaterally, [X  ] Breath Sounds equal B/L / decreased, [ X ] Poor effort, [X  ] No rales, [X  ] No rhonchi, [ X ] No wheezing  HEART:  [ X ] Regular rate and rhythm, [  ] Tachycardia, [  ] Bradycardia, [  ] Irregular, [ X ] No murmurs, No rubs, No gallops, [  ] PPM in place (Mfr:  )  ABDOMEN:  [ X ] Soft, [X  ] Nontender, [X] Nondistended, [X  ] No mass, [ X ] Bowel sounds present, [  ] Obese  NERVOUS SYSTEM:  [X  ] Alert & Oriented x3, [  ] Nonfocal, [  ] Confusion, [  ] Encephalopathic, [  ] Sedated, [  ] Unable to assess, [  ] Dementia, [x  ] Other-B/L lower Ext Contractures, B/L Hand Contractures  EXTREMITIES:  [ X ] 2+ Peripheral Pulses, No clubbing, No cyanosis,  No edema, [  ] PVD stasis skin changes B/L lower EXT, [  ] Wound  LYMPH:  No lymphadenopathy noted  SKIN:  [ x ] No rashes or lesions, [  ] Pressure ulcers, [  ] Ecchymosis, [  ] Skin tears, [x  ] Other - dry scabs on legs    DIET: Diet, Regular (07-01-22 @ 16:25)      LABS:      Ca    8.7        04 Jul 2022 06:18          Culture Results:   <10,000 CFU/mL Normal Urogenital Angle (07-01 @ 05:41)  Culture Results:   No growth to date. (06-30 @ 22:20)  Culture Results:   No growth to date. (06-30 @ 22:15)            Culture - Urine (collected 01 Jul 2022 05:41)  Source: Clean Catch Clean Catch (Midstream)  Final Report (02 Jul 2022 07:10):    <10,000 CFU/mL Normal Urogenital Angle    Culture - Blood (collected 30 Jun 2022 22:20)  Source: .Blood Blood-Peripheral  Preliminary Report (02 Jul 2022 06:00):    No growth to date.    Culture - Blood (collected 30 Jun 2022 22:15)  Source: .Blood Blood-Peripheral  Preliminary Report (02 Jul 2022 06:00):    No growth to date.       Anemia Panel:      Thyroid Panel:                RADIOLOGY & ADDITIONAL TESTS: _______      HEALTH ISSUES - PROBLEM Dx:  Sepsis due to pneumonia    Atrial fibrillation with RVR    Hypertension    Glaucoma    LATOYA (acute kidney injury)    Need for prophylactic measure    History of BPH    H/O traumatic brain injury    Chronic major depressive disorder    CAD (coronary artery disease)          Consultant(s) Notes Reviewed:  [ x ] YES     Care Discussed with [ x ] Consultants, [ x ] Patient, [ x ] Family, [  ] HCP, [ x ] , [  ] Social Service, [ x ] RN, [  ] Physical Therapy, [  ] Palliative Care Team  DVT PPX: [  ] Lovenox, [  ] SC Heparin, [  ] Coumadin, [  ] Xarelto, [  ] Eliquis, [  ] Pradaxa, [  ] IV Heparin drip, [  ] SCD, [  ] Ambulation, [  ] Contraindicated 2/2 GI Bleed, [  ] Contraindicated 2/2  Bleed, [  ] Contraindicated 2/2 Brain Bleed  Advanced Directive: [  ] None, [  ] DNR/DNI Patient is a 72y old  Male who presents with a chief complaint of Sepsis     (04 Jul 2022 11:30)    HPI:  Pt is a 72 y.o male with a PMH of HTN, AFIB, BPH, bedbound 2/2 TBI, HLD, CAD, peripheral neuropathy, glaucoma, who presented to the ED with increasing cough and sweating. History taken via phone from wife, pt was not proving information at the moment of the exam. Pt was recently admitted from 6/17 to 6/23 for Covid infection and Afib with RVR. Wife reports that today pt started to have sweating, not feeling good, increased cough, 1 episode of diarrhea and vomiting, and she decided to brought him in. She states that he denied SOB, and his mental status was at baseline.  She also reports that after the discharge pt was doing in general good with persistent mild cough. He had nurse evaluation at home 2 days ago and was doing good and yesterday was seen by neuro with no acute concerns.     ED course:   - Vitals: 144/93, , RR 16, temp 98.2 < 101.8, O2 sat 95% with NC 4lt   - Labs: WBC 27.28, N 93%. Hb 15.2. INR 1.37. Na 146.  BUN 21/Cr 1.3.  Glucose 162.  Lactate 2.6  - CT CAP: Mild-to-moderate patchy airspace disease or consolidation both lower lobes. Mild airspace disease and/or atelectasis at the lingula.  (It is a preliminary result)  - EKG: Afib , right axis deviation, RV hypertrophy septal and inferior infarct age undeterm.   - s/p Zosyn and vanco x1 dose, NS 2lt bolus, Tylenol suppository, Zofran IV x1.  (01 Jul 2022 00:35)    INTERVAL HPI:  7/1/22: pt seen, Examined, feels much better, on IV cefepime & Vanco, WBC elevated, seen by ID, No complaints  7/2/22: Pt seen, examined. feels the same as yesterday. Endorses a productive cough, but only complaint is that the food is not good. No complaints at this time. Continuing with IV cefepime & vanco., WBC -Nl , Mild Anemia ,  7/3/22: Pt seen and examined at bedside. He states he feels well, has a good appetite but would be even better if the food was good. No complaints at this time. Continue IV cefepime & vanco. WBC wnl.  7/4/22:Pt seen, examined, at bedside, WBC stable ,On IV Abx ,Off Isolation. MBS plan in AM , D/C Plan   7/5/22- Pt seen and examined at bedside.    OVERNIGHT EVENTS: No acute overnight events.     Home Medications:  aspirin 81 mg oral tablet: 1 tab(s) orally once a day (01 Jul 2022 02:13)  bethanechol 10 mg oral tablet: 1 tab(s) orally 3 times a day at (8am, 3pm, 10pm) (01 Jul 2022 02:13)  buPROPion 75 mg oral tablet: 1 tab(s) orally 2 times a day (10am, 10pm) (01 Jul 2022 02:13)  Eliquis 2.5 mg oral tablet: 1 tab(s) orally 2 times a day (10am, 10pm)t (01 Jul 2022 02:13)  gabapentin 300 mg oral capsule: 1 cap(s) orally 3 times a day (8am, 3pm, 10pm (01 Jul 2022 02:13)  latanoprost 0.005% ophthalmic solution: 1 drop(s) to each affected eye once a day (in the evening) (01 Jul 2022 02:13)  methylphenidate 10 mg oral tablet: 2 tab(s) orally once a day (in the morning) (01 Jul 2022 02:13)  rosuvastatin 10 mg oral tablet: 1 tab(s) orally once a day (01 Jul 2022 02:13)  tamsulosin 0.4 mg oral capsule: 1 cap(s) orally once a day (in the evening) (01 Jul 2022 02:13)  tiZANidine 4 mg oral tablet: 1 tab(s) orally 3 times a day (01 Jul 2022 02:13)  Vitamin D3 25 mcg (1000 intl units) oral tablet: 1 tab(s) orally once a day (01 Jul 2022 02:13)      MEDICATIONS  (STANDING):  ALBUTerol    90 MICROgram(s) HFA Inhaler 1 Puff(s) Inhalation three times a day  ammonium lactate 12% Lotion 1 Application(s) Topical two times a day  apixaban 2.5 milliGRAM(s) Oral every 12 hours  aspirin  chewable 81 milliGRAM(s) Oral daily  atorvastatin 40 milliGRAM(s) Oral at bedtime  bethanechol 10 milliGRAM(s) Oral three times a day  buPROPion XL (24-Hour) . 150 milliGRAM(s) Oral daily  cefepime   IVPB 2000 milliGRAM(s) IV Intermittent every 12 hours  dextrose 5%. 1000 milliLiter(s) (50 mL/Hr) IV Continuous <Continuous>  diltiazem    milliGRAM(s) Oral daily  gabapentin 300 milliGRAM(s) Oral three times a day  latanoprost 0.005% Ophthalmic Solution 1 Drop(s) Both EYES at bedtime  losartan 25 milliGRAM(s) Oral daily  methylphenidate 20 milliGRAM(s) Oral <User Schedule>  mupirocin 2% Nasal 1 Application(s) Both Nostrils two times a day  senna 2 Tablet(s) Oral at bedtime  tamsulosin 0.4 milliGRAM(s) Oral at bedtime  tiZANidine 4 milliGRAM(s) Oral three times a day  vancomycin  IVPB 1000 milliGRAM(s) IV Intermittent every 24 hours    MEDICATIONS  (PRN):  acetaminophen     Tablet .. 650 milliGRAM(s) Oral every 6 hours PRN Temp greater or equal to 38C (100.4F), Mild Pain (1 - 3)  benzonatate 100 milliGRAM(s) Oral three times a day PRN Cough  sodium chloride 0.65% Nasal 1 Spray(s) Both Nostrils four times a day PRN Nasal Congestion      No Known Allergies      Social History:  Lives with wife.  Pt is bedbound and wheelchair bound. Pt is reliant on wife for ADLs.   No h/o alcohol, tobacco, or recreational drug use  COVID vaccinated x3, last in february with Moderna. (01 Jul 2022 00:35)      REVIEW OF SYSTEMS: i am OK  CONSTITUTIONAL: No fever, No chills, No fatigue, No myalgia, No Body ache, No Weakness  EYES: No eye pain,  No visual disturbances, No discharge, NO Redness  ENMT:  No ear pain, No nose bleed, No vertigo; No sinus pain, NO throat pain, No Congestion  NECK: No pain, No stiffness  RESPIRATORY: + productive cough, NO wheezing, No  hemoptysis, NO shortness of breath  CARDIOVASCULAR: No chest pain, palpitations  GASTROINTESTINAL: No abdominal pain, NO epigastric pain. No nausea, No vomiting; No diarrhea, No constipation. [  ] BM  GENITOURINARY: No dysuria, No frequency, No urgency, No hematuria, NO incontinence  NEUROLOGICAL: No headaches, No dizziness, No numbness, No tingling, No tremors, No weakness  EXT: + neuropathic Pain b/l feet, No Swelling, No Edema  SKIN:  [x  ] No itching, burning, rashes, or lesions   MUSCULOSKELETAL: No joint pain ,No Jt swelling; No muscle pain, No back pain, No extremity pain  PSYCHIATRIC: No depression,  No anxiety,  No mood swings ,No difficulty sleeping at night  PAIN SCALE: [ x ] None  [  ] Other-  ROS Unable to obtain due to - [  ] Dementia  [  ] Lethargy [  ] Drowsy [  ] Sedated [  ] non verbal  REST OF REVIEW Of SYSTEM - [x  ] Normal     Vital Signs Last 24 Hrs  T(C): 36.5 (05 Jul 2022 04:00), Max: 37.1 (04 Jul 2022 11:10)  T(F): 97.7 (05 Jul 2022 04:00), Max: 98.7 (04 Jul 2022 11:10)  HR: 70 (05 Jul 2022 04:00) (70 - 82)  BP: 128/82 (05 Jul 2022 04:00) (128/82 - 150/94)  BP(mean): --  RR: 18 (05 Jul 2022 04:00) (18 - 19)  SpO2: 95% (05 Jul 2022 04:00) (94% - 95%)    CAPILLARY BLOOD GLUCOSE          I&O's Summary    04 Jul 2022 07:01  -  05 Jul 2022 07:00  --------------------------------------------------------  IN: 0 mL / OUT: 1050 mL / NET: -1050 mL      PHYSICAL EXAM:  GENERAL:  [ X ] NAD, [X  ] Well appearing, [  ] Agitated, [  ] Drowsy, [  ] Lethargy, [  ] Confused   HEAD:  [X  ] Normal, [  ] Other  EYES:  [X  ] EOMI, [X  ] PERRLA, [X  ] Conjunctiva and sclera clear normal, [  ] Other, [  ] Pallor, [  ] Discharge  ENMT:  [ X ] Normal, [x  ] Moist mucous membranes, [ x ] Good dentition, [ x ] No thrush  NECK:  [X  ] Supple, [ x ] No JVD, [ x ] Normal thyroid, [  ] Lymphadenopathy, [  ] Other  CHEST/LUNG:  [x  ] Clear to auscultation bilaterally, [X  ] Breath Sounds equal B/L / decreased, [ X ] Poor effort, [X  ] No rales, [X  ] No rhonchi, [ X ] No wheezing  HEART:  [ X ] Regular rate and rhythm, [  ] Tachycardia, [  ] Bradycardia, [  ] Irregular, [ X ] No murmurs, No rubs, No gallops, [  ] PPM in place (Mfr:  )  ABDOMEN:  [ X ] Soft, [X  ] Nontender, [X] Nondistended, [X  ] No mass, [ X ] Bowel sounds present, [  ] Obese  NERVOUS SYSTEM:  [X  ] Alert & Oriented x3, [  ] Nonfocal, [  ] Confusion, [  ] Encephalopathic, [  ] Sedated, [  ] Unable to assess, [  ] Dementia, [x  ] Other-B/L lower Ext Contractures, B/L Hand Contractures  EXTREMITIES:  [ X ] 2+ Peripheral Pulses, No clubbing, No cyanosis,  No edema, [  ] PVD stasis skin changes B/L lower EXT, [  ] Wound  LYMPH:  No lymphadenopathy noted  SKIN:  [ x ] No rashes or lesions, [  ] Pressure ulcers, [  ] Ecchymosis, [  ] Skin tears, [x  ] Other - dry scabs on legs    DIET: Diet, Regular (07-01-22 @ 16:25)      LABS:      Ca    8.7        04 Jul 2022 06:18          Culture Results:   <10,000 CFU/mL Normal Urogenital Angle (07-01 @ 05:41)  Culture Results:   No growth to date. (06-30 @ 22:20)  Culture Results:   No growth to date. (06-30 @ 22:15)            Culture - Urine (collected 01 Jul 2022 05:41)  Source: Clean Catch Clean Catch (Midstream)  Final Report (02 Jul 2022 07:10):    <10,000 CFU/mL Normal Urogenital Angle    Culture - Blood (collected 30 Jun 2022 22:20)  Source: .Blood Blood-Peripheral  Preliminary Report (02 Jul 2022 06:00):    No growth to date.    Culture - Blood (collected 30 Jun 2022 22:15)  Source: .Blood Blood-Peripheral  Preliminary Report (02 Jul 2022 06:00):    No growth to date.       Anemia Panel:      Thyroid Panel:                RADIOLOGY & ADDITIONAL TESTS: _______      HEALTH ISSUES - PROBLEM Dx:  Sepsis due to pneumonia    Atrial fibrillation with RVR    Hypertension    Glaucoma    LATOYA (acute kidney injury)    Need for prophylactic measure    History of BPH    H/O traumatic brain injury    Chronic major depressive disorder    CAD (coronary artery disease)          Consultant(s) Notes Reviewed:  [ x ] YES     Care Discussed with [ x ] Consultants, [ x ] Patient, [ x ] Family, [  ] HCP, [ x ] , [  ] Social Service, [ x ] RN, [  ] Physical Therapy, [  ] Palliative Care Team  DVT PPX: [  ] Lovenox, [  ] SC Heparin, [  ] Coumadin, [  ] Xarelto, [ X ] Eliquis, [  ] Pradaxa, [  ] IV Heparin drip, [  ] SCD, [  ] Ambulation, [  ] Contraindicated 2/2 GI Bleed, [  ] Contraindicated 2/2  Bleed, [  ] Contraindicated 2/2 Brain Bleed  Advanced Directive: [ X ] None, [  ] DNR/DNI Patient is a 72y old  Male who presents with a chief complaint of Sepsis     (04 Jul 2022 11:30)    HPI:  Pt is a 72 y.o male with a PMH of HTN, AFIB, BPH, bedbound 2/2 TBI, HLD, CAD, peripheral neuropathy, glaucoma, who presented to the ED with increasing cough and sweating. History taken via phone from wife, pt was not proving information at the moment of the exam. Pt was recently admitted from 6/17 to 6/23 for Covid infection and Afib with RVR. Wife reports that today pt started to have sweating, not feeling good, increased cough, 1 episode of diarrhea and vomiting, and she decided to brought him in. She states that he denied SOB, and his mental status was at baseline.  She also reports that after the discharge pt was doing in general good with persistent mild cough. He had nurse evaluation at home 2 days ago and was doing good and yesterday was seen by neuro with no acute concerns.     ED course:   - Vitals: 144/93, , RR 16, temp 98.2 < 101.8, O2 sat 95% with NC 4lt   - Labs: WBC 27.28, N 93%. Hb 15.2. INR 1.37. Na 146.  BUN 21/Cr 1.3.  Glucose 162.  Lactate 2.6  - CT CAP: Mild-to-moderate patchy airspace disease or consolidation both lower lobes. Mild airspace disease and/or atelectasis at the lingula.  (It is a preliminary result)  - EKG: Afib , right axis deviation, RV hypertrophy septal and inferior infarct age undeterm.   - s/p Zosyn and vanco x1 dose, NS 2lt bolus, Tylenol suppository, Zofran IV x1.  (01 Jul 2022 00:35)    INTERVAL HPI:  7/1/22: pt seen, Examined, feels much better, on IV cefepime & Vanco, WBC elevated, seen by ID, No complaints  7/2/22: Pt seen, examined. feels the same as yesterday. Endorses a productive cough, but only complaint is that the food is not good. No complaints at this time. Continuing with IV cefepime & vanco., WBC -Nl , Mild Anemia ,  7/3/22: Pt seen and examined at bedside. He states he feels well, has a good appetite but would be even better if the food was good. No complaints at this time. Continue IV cefepime & vanco. WBC wnl.  7/4/22:Pt seen, examined, at bedside, WBC stable ,On IV Abx ,Off Isolation. MBS plan in AM , D/C Plan   7/5/22- Pt seen and examined at bedside. CT scan A/P -done, Plan for MRI MRCP to evaluate Pancreas.     OVERNIGHT EVENTS: No acute overnight events.     Home Medications:  aspirin 81 mg oral tablet: 1 tab(s) orally once a day (01 Jul 2022 02:13)  bethanechol 10 mg oral tablet: 1 tab(s) orally 3 times a day at (8am, 3pm, 10pm) (01 Jul 2022 02:13)  buPROPion 75 mg oral tablet: 1 tab(s) orally 2 times a day (10am, 10pm) (01 Jul 2022 02:13)  Eliquis 2.5 mg oral tablet: 1 tab(s) orally 2 times a day (10am, 10pm)t (01 Jul 2022 02:13)  gabapentin 300 mg oral capsule: 1 cap(s) orally 3 times a day (8am, 3pm, 10pm (01 Jul 2022 02:13)  latanoprost 0.005% ophthalmic solution: 1 drop(s) to each affected eye once a day (in the evening) (01 Jul 2022 02:13)  methylphenidate 10 mg oral tablet: 2 tab(s) orally once a day (in the morning) (01 Jul 2022 02:13)  rosuvastatin 10 mg oral tablet: 1 tab(s) orally once a day (01 Jul 2022 02:13)  tamsulosin 0.4 mg oral capsule: 1 cap(s) orally once a day (in the evening) (01 Jul 2022 02:13)  tiZANidine 4 mg oral tablet: 1 tab(s) orally 3 times a day (01 Jul 2022 02:13)  Vitamin D3 25 mcg (1000 intl units) oral tablet: 1 tab(s) orally once a day (01 Jul 2022 02:13)      MEDICATIONS  (STANDING):  ALBUTerol    90 MICROgram(s) HFA Inhaler 1 Puff(s) Inhalation three times a day  ammonium lactate 12% Lotion 1 Application(s) Topical two times a day  apixaban 2.5 milliGRAM(s) Oral every 12 hours  aspirin  chewable 81 milliGRAM(s) Oral daily  atorvastatin 40 milliGRAM(s) Oral at bedtime  bethanechol 10 milliGRAM(s) Oral three times a day  buPROPion XL (24-Hour) . 150 milliGRAM(s) Oral daily  cefepime   IVPB 2000 milliGRAM(s) IV Intermittent every 12 hours  dextrose 5%. 1000 milliLiter(s) (50 mL/Hr) IV Continuous <Continuous>  diltiazem    milliGRAM(s) Oral daily  gabapentin 300 milliGRAM(s) Oral three times a day  latanoprost 0.005% Ophthalmic Solution 1 Drop(s) Both EYES at bedtime  losartan 25 milliGRAM(s) Oral daily  methylphenidate 20 milliGRAM(s) Oral <User Schedule>  mupirocin 2% Nasal 1 Application(s) Both Nostrils two times a day  senna 2 Tablet(s) Oral at bedtime  tamsulosin 0.4 milliGRAM(s) Oral at bedtime  tiZANidine 4 milliGRAM(s) Oral three times a day  vancomycin  IVPB 1000 milliGRAM(s) IV Intermittent every 24 hours    MEDICATIONS  (PRN):  acetaminophen     Tablet .. 650 milliGRAM(s) Oral every 6 hours PRN Temp greater or equal to 38C (100.4F), Mild Pain (1 - 3)  benzonatate 100 milliGRAM(s) Oral three times a day PRN Cough  sodium chloride 0.65% Nasal 1 Spray(s) Both Nostrils four times a day PRN Nasal Congestion      No Known Allergies      Social History:  Lives with wife.  Pt is bedbound and wheelchair bound. Pt is reliant on wife for ADLs.   No h/o alcohol, tobacco, or recreational drug use  COVID vaccinated x3, last in february with Moderna. (01 Jul 2022 00:35)      REVIEW OF SYSTEMS: i am OK  CONSTITUTIONAL: No fever, No chills, No fatigue, No myalgia, No Body ache, No Weakness  EYES: No eye pain,  No visual disturbances, No discharge, NO Redness  ENMT:  No ear pain, No nose bleed, No vertigo; No sinus pain, NO throat pain, No Congestion  NECK: No pain, No stiffness  RESPIRATORY: + productive cough, NO wheezing, No  hemoptysis, NO shortness of breath  CARDIOVASCULAR: No chest pain, palpitations  GASTROINTESTINAL: No abdominal pain, NO epigastric pain. No nausea, No vomiting; No diarrhea, No constipation. [  ] BM  GENITOURINARY: No dysuria, No frequency, No urgency, No hematuria, NO incontinence  NEUROLOGICAL: No headaches, No dizziness, No numbness, No tingling, No tremors, No weakness  EXT: + neuropathic Pain b/l feet, No Swelling, No Edema  SKIN:  [x  ] No itching, burning, rashes, or lesions   MUSCULOSKELETAL: No joint pain ,No Jt swelling; No muscle pain, No back pain, No extremity pain  PSYCHIATRIC: No depression,  No anxiety,  No mood swings ,No difficulty sleeping at night  PAIN SCALE: [ x ] None  [  ] Other-  ROS Unable to obtain due to - [  ] Dementia  [  ] Lethargy [  ] Drowsy [  ] Sedated [  ] non verbal  REST OF REVIEW Of SYSTEM - [x  ] Normal     Vital Signs Last 24 Hrs  T(C): 36.5 (05 Jul 2022 04:00), Max: 37.1 (04 Jul 2022 11:10)  T(F): 97.7 (05 Jul 2022 04:00), Max: 98.7 (04 Jul 2022 11:10)  HR: 70 (05 Jul 2022 04:00) (70 - 82)  BP: 128/82 (05 Jul 2022 04:00) (128/82 - 150/94)  BP(mean): --  RR: 18 (05 Jul 2022 04:00) (18 - 19)  SpO2: 95% (05 Jul 2022 04:00) (94% - 95%)    CAPILLARY BLOOD GLUCOSE          I&O's Summary    04 Jul 2022 07:01  -  05 Jul 2022 07:00  --------------------------------------------------------  IN: 0 mL / OUT: 1050 mL / NET: -1050 mL      PHYSICAL EXAM:  GENERAL:  [ X ] NAD, [X  ] Well appearing, [  ] Agitated, [  ] Drowsy, [  ] Lethargy, [  ] Confused   HEAD:  [X  ] Normal, [  ] Other  EYES:  [X  ] EOMI, [X  ] PERRLA, [X  ] Conjunctiva and sclera clear normal, [  ] Other, [  ] Pallor, [  ] Discharge  ENMT:  [ X ] Normal, [x  ] Moist mucous membranes, [ x ] Good dentition, [ x ] No thrush  NECK:  [X  ] Supple, [ x ] No JVD, [ x ] Normal thyroid, [  ] Lymphadenopathy, [  ] Other  CHEST/LUNG:  [x  ] Clear to auscultation bilaterally, [X  ] Breath Sounds equal B/L / decreased, [ X ] Poor effort, [X  ] No rales, [X  ] No rhonchi, [ X ] No wheezing  HEART:  [ X ] Regular rate and rhythm, [  ] Tachycardia, [  ] Bradycardia, [  ] Irregular, [ X ] No murmurs, No rubs, No gallops, [  ] PPM in place (Mfr:  )  ABDOMEN:  [ X ] Soft, [X  ] Nontender, [X] Nondistended, [X  ] No mass, [ X ] Bowel sounds present, [  ] Obese  NERVOUS SYSTEM:  [X  ] Alert & Oriented x3, [  ] Nonfocal, [  ] Confusion, [  ] Encephalopathic, [  ] Sedated, [  ] Unable to assess, [  ] Dementia, [x  ] Other-B/L lower Ext Contractures, B/L Hand Contractures  EXTREMITIES:  [ X ] 2+ Peripheral Pulses, No clubbing, No cyanosis,  No edema, [  ] PVD stasis skin changes B/L lower EXT, [  ] Wound  LYMPH:  No lymphadenopathy noted  SKIN:  [ x ] No rashes or lesions, [  ] Pressure ulcers, [  ] Ecchymosis, [  ] Skin tears, [x  ] Other - dry scabs on legs    DIET: Diet, Regular (07-01-22 @ 16:25)      LABS:                        11.5   8.00  )-----------( 199      ( 05 Jul 2022 06:35 )             35.1     05 Jul 2022 06:35    143    |  109    |  12     ----------------------------<  132    4.3     |  29     |  0.94     Ca    8.9        05 Jul 2022 06:35      Culture Results:   <10,000 CFU/mL Normal Urogenital Angle (07-01 @ 05:41)  Culture Results:   No growth to date. (06-30 @ 22:20)  Culture Results:   No growth to date. (06-30 @ 22:15)    Culture - Urine (collected 01 Jul 2022 05:41)  Source: Clean Catch Clean Catch (Midstream)  Final Report (02 Jul 2022 07:10):    <10,000 CFU/mL Normal Urogenital Angle    Culture - Blood (collected 30 Jun 2022 22:20)  Source: .Blood Blood-Peripheral  Preliminary Report (02 Jul 2022 06:00):    No growth to date.    Culture - Blood (collected 30 Jun 2022 22:15)  Source: .Blood Blood-Peripheral  Preliminary Report (02 Jul 2022 06:00):    No growth to date.      RADIOLOGY & ADDITIONAL TESTS:  < from: CT Abdomen and Pelvis w/ IV Cont (07.05.22 @ 10:55) >  Sagittal and coronal reformats were performed.    FINDINGS:    LOWER CHEST:  Improved bilateral lower lobe opacities since 6/30/2022,   which may reflect atelectasis or residual/ resolving infection. Trace   pleural effusions. Partially imaged heart is enlarged. Coronary artery   calcification. Gynecomastia.    LIVER: Partially recanalized paraumbilical vein. 6 mm hypervascular focus   of the medial left hepatic lobe seen on arterial phase only, image 20   series 2, could reflect small shunt, FNH, or regenerative nodule.  BILE DUCTS: No biliary distention  GALLBLADDER: Contracted  SPLEEN: Normal size. 1 cm hypervascular focus of the spleen seen on   arterial phase only, image 8 series 2, could reflect a hemangioma,   hamartoma, or other small shunt.  PANCREAS: No acute peripancreatic inflammation or main ductal dilatation.   Atrophy is noted at the pancreatic head. Again, fullness is noted at the   pancreatic uncinate process with masslike appearance, image 42 series 2   and image 42 series 3. Recommend further evaluation with abdominal MRI   pancreas protocol for characterization.  ADRENALS: Thickening of the adrenals, left great than right. Possible   underlying left adrenal nodule, image 27 series 2, inadequately   characterized on this study.  KIDNEYS/URETERS: No hydronephrosis. Renal cysts and additional   hypodensities too small to characterize. Bilateral   periureteral/collecting system stranding.    BLADDER: Mild mural thickening despite underdistention. Mild surrounding   inflammation as well.  REPRODUCTIVE ORGANS: Prostate is enlarged.    BOWEL: No bowel obstruction. Appendix is normal. Mild stool burden of the   colon limits evaluation of the colonic mucosa.  PERITONEUM: No ascites  VESSELS: No abdominal aortic aneurysm. Atheromatous changes.  RETROPERITONEUM/LYMPH NODES: Small volume nodes.  ABDOMINAL WALL: Small fat-containing inguinal hernias. Soft tissue edema.  BONES: Osseous demineralization and degenerative changes.    IMPRESSION:    Again, fullness is noted at the pancreatic uncinate process with masslike   appearance. Whether this represents normal confluent pancreatic   parenchyma tissue or mass lesion is unclear based on this study.   Recommend further evaluation with abdominal MRI pancreas protocol for   characterization.    Improved bilateral lower lobe opacities since 6/30/2022, which may   reflect atelectasis or residual/ resolving infection. Trace pleural   effusions.    Question urinary tract infection/cystitis. Correlate with urinalysis.    --- End of Report ---          < end of copied text >        HEALTH ISSUES - PROBLEM Dx:  Sepsis due to pneumonia    Atrial fibrillation with RVR    Hypertension    Glaucoma    LATOYA (acute kidney injury)    Need for prophylactic measure    History of BPH    H/O traumatic brain injury    Chronic major depressive disorder    CAD (coronary artery disease)          Consultant(s) Notes Reviewed:  [ x ] YES     Care Discussed with [ x ] Consultants, [ x ] Patient, [ x ] Family, [  ] HCP, [ x ] , [  ] Social Service, [ x ] RN, [  ] Physical Therapy, [  ] Palliative Care Team  DVT PPX: [  ] Lovenox, [  ] SC Heparin, [  ] Coumadin, [  ] Xarelto, [ X ] Eliquis, [  ] Pradaxa, [  ] IV Heparin drip, [  ] SCD, [  ] Ambulation, [  ] Contraindicated 2/2 GI Bleed, [  ] Contraindicated 2/2  Bleed, [  ] Contraindicated 2/2 Brain Bleed  Advanced Directive: [ X ] None, [  ] DNR/DNI

## 2022-07-05 NOTE — DISCHARGE NOTE PROVIDER - HOSPITAL COURSE
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 History taken via phone from wife, pt was not proving information at the moment of the exam. Pt was recently admitted from 6/17 to 6/23 for Covid infection and Afib with RVR. Wife reports that today pt started to have sweating, not feeling good, increased cough, 1 episode of diarrhea and vomiting, and she decided to brought him in. She states that he denied SOB, and his mental status was at baseline.  She also reports that after the discharge pt was doing in general good with persistent mild cough. He had nurse evaluation at home 2 days ago and was doing good and yesterday was seen by neuro with no acute concerns.     ED course:   - Vitals: 144/93, , RR 16, temp 98.2 < 101.8, O2 sat 95% with NC 4lt   - Labs: WBC 27.28, N 93%. Hb 15.2. INR 1.37. Na 146.  BUN 21/Cr 1.3.  Glucose 162.  Lactate 2.6  - CT CAP: Mild-to-moderate patchy airspace disease or consolidation both lower lobes. Mild airspace disease and/or atelectasis at the lingula.  (It is a preliminary result)  - EKG: Afib , right axis deviation, RV hypertrophy septal and inferior infarct age undeterm.   - s/p Zosyn and vanco x1 dose, NS 2lt bolus, Tylenol suppository, Zofran IV x1.  (01 Jul 2022 00:35)      ---  HOSPITAL COURSE:   Pt presented to the ED with increasing cough and sweating.  Per wife, pt started to have sweating, not feeling good, increased cough, 1 episode of diarrhea and vomiting. Pt presented with tachycardia, fever, and elevated WBC. CT CAP showed mild-to-moderate patchy airspace disease or consolidation both lower lobes. Mild airspace disease and/or atelectasis at the lingula. Started on IV cefepime & Vanco with improvement in clinical condition. Pt had CT A/P on 7/5/22 that demonstrated: "fullness at the pancreatic uncinate process with masslike appearance. Whether this represents normal confluent pancreatic parenchyma tissue or mass lesion is unclear based on this study. Recommend further evaluation with abdominal MRI pancreas protocol for characterization. Improved bilateral lower lobe opacities since 6/30/2022, which may reflect atelectasis or residual/ resolving infection. Trace pleural effusions." Pt also completed MBS with recommendation of regular diet in small cup sips. Pt completed antibiotic course and is ready for discharge home.  Patient was medically optimized and improved clinically throughout hospital course. Patient seen and examined on day of discharge.    Vital Signs Last 24 Hrs  T(C): 36.5 (05 Jul 2022 04:00), Max: 36.7 (04 Jul 2022 20:36)  T(F): 97.7 (05 Jul 2022 04:00), Max: 98.1 (04 Jul 2022 20:36)  HR: 70 (05 Jul 2022 04:00) (70 - 82)  BP: 128/82 (05 Jul 2022 04:00) (128/82 - 150/94)  BP(mean): --  RR: 18 (05 Jul 2022 04:00) (18 - 18)  SpO2: 95% (05 Jul 2022 04:00) (95% - 95%)    PHYSICAL EXAM:  GENERAL:  [ X ] NAD, [X  ] Well appearing, [  ] Agitated, [  ] Drowsy, [  ] Lethargy, [  ] Confused   HEAD:  [X  ] Normal, [  ] Other  EYES:  [X  ] EOMI, [X  ] PERRLA, [X  ] Conjunctiva and sclera clear normal, [  ] Other, [  ] Pallor, [  ] Discharge  ENMT:  [ X ] Normal, [x  ] Moist mucous membranes, [ x ] Good dentition, [ x ] No thrush  NECK:  [X  ] Supple, [ x ] No JVD, [ x ] Normal thyroid, [  ] Lymphadenopathy, [  ] Other  CHEST/LUNG:  [x  ] Clear to auscultation bilaterally, [X  ] Breath Sounds equal B/L / decreased, [ X ] Poor effort, [X  ] No rales, [X  ] No rhonchi, [ X ] No wheezing  HEART:  [ X ] Regular rate and rhythm, [  ] Tachycardia, [  ] Bradycardia, [  ] Irregular, [ X ] No murmurs, No rubs, No gallops, [  ] PPM in place (Mfr:  )  ABDOMEN:  [ X ] Soft, [X  ] Nontender, [X] Nondistended, [X  ] No mass, [ X ] Bowel sounds present, [  ] Obese  NERVOUS SYSTEM:  [X  ] Alert & Oriented x3, [  ] Nonfocal, [  ] Confusion, [  ] Encephalopathic, [  ] Sedated, [  ] Unable to assess, [  ] Dementia, [x  ] Other-B/L lower Ext Contractures, B/L Hand Contractures  EXTREMITIES:  [ X ] 2+ Peripheral Pulses, No clubbing, No cyanosis,  No edema, [  ] PVD stasis skin changes B/L lower EXT, [  ] Wound  LYMPH:  No lymphadenopathy noted  SKIN:  [ x ] No rashes or lesions, [  ] Pressure ulcers, [  ] Ecchymosis, [  ] Skin tears, [x  ] Other - dry scabs on legs    Patient is medically stable for discharge to home with outpatient follow up.  ---  CONSULTANTS:   ID: Dr. Perea  ---  TIME SPENT:  I, the attending physician, was physically present for the key portions of the evaluation and management (E/M) service provided. 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 History taken via phone from wife, pt was not proving information at the moment of the exam. Pt was recently admitted from 6/17 to 6/23 for Covid infection and Afib with RVR. Wife reports that today pt started to have sweating, not feeling good, increased cough, 1 episode of diarrhea and vomiting, and she decided to brought him in. She states that he denied SOB, and his mental status was at baseline.  She also reports that after the discharge pt was doing in general good with persistent mild cough. He had nurse evaluation at home 2 days ago and was doing good and yesterday was seen by neuro with no acute concerns.     ED course:   - Vitals: 144/93, , RR 16, temp 98.2 < 101.8, O2 sat 95% with NC 4lt   - Labs: WBC 27.28, N 93%. Hb 15.2. INR 1.37. Na 146.  BUN 21/Cr 1.3.  Glucose 162.  Lactate 2.6  - CT CAP: Mild-to-moderate patchy airspace disease or consolidation both lower lobes. Mild airspace disease and/or atelectasis at the lingula.  (It is a preliminary result)  - EKG: Afib , right axis deviation, RV hypertrophy septal and inferior infarct age undeterm.   - s/p Zosyn and vanco x1 dose, NS 2lt bolus, Tylenol suppository, Zofran IV x1.  (01 Jul 2022 00:35)      ---  HOSPITAL COURSE:   Pt presented to the ED with increasing cough and sweating.  Per wife, pt started to have sweating, not feeling good, increased cough, 1 episode of diarrhea and vomiting. Pt presented with tachycardia, fever, and elevated WBC. CT CAP showed mild-to-moderate patchy airspace disease or consolidation both lower lobes. Mild airspace disease and/or atelectasis at the lingula. Started on IV cefepime & Vanco with improvement in clinical condition. Pt had CT A/P on 7/5/22 that demonstrated: "fullness at the pancreatic uncinate process with masslike appearance. Whether this represents normal confluent pancreatic parenchyma tissue or mass lesion is unclear based on this study. Recommend further evaluation with abdominal MRI pancreas protocol for characterization. Improved bilateral lower lobe opacities since 6/30/2022, which may reflect atelectasis or residual/ resolving infection. Trace pleural effusions." Pt also completed MBS with recommendation of regular diet in small cup sips. Pt switched from IV to PO antibiotics and is ready for discharge home. Patient was medically optimized and improved clinically throughout hospital course. Patient seen and examined on day of discharge.    Vital Signs Last 24 Hrs  T(C): 36.5 (05 Jul 2022 04:00), Max: 36.7 (04 Jul 2022 20:36)  T(F): 97.7 (05 Jul 2022 04:00), Max: 98.1 (04 Jul 2022 20:36)  HR: 70 (05 Jul 2022 04:00) (70 - 82)  BP: 128/82 (05 Jul 2022 04:00) (128/82 - 150/94)  BP(mean): --  RR: 18 (05 Jul 2022 04:00) (18 - 18)  SpO2: 95% (05 Jul 2022 04:00) (95% - 95%)    PHYSICAL EXAM:  GENERAL:  [ X ] NAD, [X  ] Well appearing, [  ] Agitated, [  ] Drowsy, [  ] Lethargy, [  ] Confused   HEAD:  [X  ] Normal, [  ] Other  EYES:  [X  ] EOMI, [X  ] PERRLA, [X  ] Conjunctiva and sclera clear normal, [  ] Other, [  ] Pallor, [  ] Discharge  ENMT:  [ X ] Normal, [x  ] Moist mucous membranes, [ x ] Good dentition, [ x ] No thrush  NECK:  [X  ] Supple, [ x ] No JVD, [ x ] Normal thyroid, [  ] Lymphadenopathy, [  ] Other  CHEST/LUNG:  [x  ] Clear to auscultation bilaterally, [X  ] Breath Sounds equal B/L / decreased, [ X ] Poor effort, [X  ] No rales, [X  ] No rhonchi, [ X ] No wheezing  HEART:  [ X ] Regular rate and rhythm, [  ] Tachycardia, [  ] Bradycardia, [  ] Irregular, [ X ] No murmurs, No rubs, No gallops, [  ] PPM in place (Mfr:  )  ABDOMEN:  [ X ] Soft, [X  ] Nontender, [X] Nondistended, [X  ] No mass, [ X ] Bowel sounds present, [  ] Obese  NERVOUS SYSTEM:  [X  ] Alert & Oriented x3, [  ] Nonfocal, [  ] Confusion, [  ] Encephalopathic, [  ] Sedated, [  ] Unable to assess, [  ] Dementia, [x  ] Other-B/L lower Ext Contractures, B/L Hand Contractures  EXTREMITIES:  [ X ] 2+ Peripheral Pulses, No clubbing, No cyanosis,  No edema, [  ] PVD stasis skin changes B/L lower EXT, [  ] Wound  LYMPH:  No lymphadenopathy noted  SKIN:  [ x ] No rashes or lesions, [  ] Pressure ulcers, [  ] Ecchymosis, [  ] Skin tears, [x  ] Other - dry scabs on legs    Patient is medically stable for discharge to home with outpatient follow up.  ---  CONSULTANTS:   ID: Dr. Perea  ---  TIME SPENT:  I, the attending physician, was physically present for the key portions of the evaluation and management (E/M) service provided. 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 History taken via phone from wife, pt was not proving information at the moment of the exam. Pt was recently admitted from 6/17 to 6/23 for Covid infection and Afib with RVR. Wife reports that today pt started to have sweating, not feeling good, increased cough, 1 episode of diarrhea and vomiting, and she decided to brought him in. She states that he denied SOB, and his mental status was at baseline.  She also reports that after the discharge pt was doing in general good with persistent mild cough. He had nurse evaluation at home 2 days ago and was doing good and yesterday was seen by neuro with no acute concerns.     ED course:   - Vitals: 144/93, , RR 16, temp 98.2 < 101.8, O2 sat 95% with NC 4lt   - Labs: WBC 27.28, N 93%. Hb 15.2. INR 1.37. Na 146.  BUN 21/Cr 1.3.  Glucose 162.  Lactate 2.6  - CT CAP: Mild-to-moderate patchy airspace disease or consolidation both lower lobes. Mild airspace disease and/or atelectasis at the lingula.  (It is a preliminary result)  - EKG: Afib , right axis deviation, RV hypertrophy septal and inferior infarct age undeterm.   - s/p Zosyn and vanco x1 dose, NS 2lt bolus, Tylenol suppository, Zofran IV x1.  (01 Jul 2022 00:35)      ---  HOSPITAL COURSE:   Pt presented to the ED with increasing cough and sweating, admitted for sepsis 2/2 PNA. CT CAP showed mild-to-moderate patchy airspace disease or consolidation both lower lobes. Mild airspace disease and/or atelectasis at the lingula. Infectious Disease consulted, patient started on IV cefepime & Vanco with improvement in clinical condition. MRSA PCR+, patient started on course of nasal Bactroban. Blood cultures negative x4, urine culture neg. CT abd/p on admission showed Uncinate process acinar fullness, cannot exclude pancreatic lesion. Pt had CT A/P with IV Contrast for further eval on 7/5/22 that demonstrated fullness at the pancreatic uncinate process with masslike appearance. Whether this represents normal confluent pancreatic parenchyma tissue or mass lesion is unclear based on this study. Recommend further evaluation with abdominal MRI pancreas protocol for characterization. Improved bilateral lower lobe opacities since 6/30/2022, which may reflect atelectasis or residual/ resolving infection. Trace pleural effusions. Further eval with MRCP showed *******. Patient underwent SS eval for concern for aspiration. MBS resulted in recommendation of regular diet in small cup sips. Pt was transitioned to from IV to PO antibiotics and is ready for discharge home. Patient was medically optimized and improved clinically throughout hospital course. Patient seen and examined on day of discharge..    Vital Signs Last 24 Hrs  T(C): 37.1 (06 Jul 2022 04:45), Max: 37.2 (05 Jul 2022 19:59)  T(F): 98.7 (06 Jul 2022 04:45), Max: 98.9 (05 Jul 2022 19:59)  HR: 87 (06 Jul 2022 04:45) (75 - 87)  BP: 157/86 (06 Jul 2022 04:45) (140/93 - 157/86)  BP(mean): --  RR: 18 (06 Jul 2022 04:45) (18 - 18)  SpO2: 96% (06 Jul 2022 04:45) (96% - 97%)    PHYSICAL EXAM:  GENERAL:  [ X ] NAD, [X  ] Well appearing, [  ] Agitated, [  ] Drowsy, [  ] Lethargy, [  ] Confused   HEAD:  [X  ] Normal, [  ] Other  EYES:  [X  ] EOMI, [X  ] PERRLA, [X  ] Conjunctiva and sclera clear normal, [  ] Other, [  ] Pallor, [  ] Discharge  ENMT:  [ X ] Normal, [x  ] Moist mucous membranes, [ x ] Good dentition, [ x ] No thrush  NECK:  [X  ] Supple, [ x ] No JVD, [ x ] Normal thyroid, [  ] Lymphadenopathy, [  ] Other  CHEST/LUNG:  [x  ] Clear to auscultation bilaterally, [X  ] Breath Sounds equal B/L / decreased, [ X ] Poor effort, [X  ] No rales, [X  ] No rhonchi, [ X ] No wheezing  HEART:  [ X ] Regular rate and rhythm, [  ] Tachycardia, [  ] Bradycardia, [  ] Irregular, [ X ] No murmurs, No rubs, No gallops, [  ] PPM in place (Mfr:  )  ABDOMEN:  [ X ] Soft, [X  ] Nontender, [X] Nondistended, [X  ] No mass, [ X ] Bowel sounds present, [  ] Obese  NERVOUS SYSTEM:  [X  ] Alert & Oriented x3, [  ] Nonfocal, [  ] Confusion, [  ] Encephalopathic, [  ] Sedated, [  ] Unable to assess, [  ] Dementia, [x  ] Other-B/L lower Ext Contractures, B/L Hand Contractures  EXTREMITIES:  [ X ] 2+ Peripheral Pulses, No clubbing, No cyanosis,  No edema, [  ] PVD stasis skin changes B/L lower EXT, [  ] Wound  LYMPH:  No lymphadenopathy noted  SKIN:  [ x ] No rashes or lesions, [  ] Pressure ulcers, [  ] Ecchymosis, [  ] Skin tears, [x  ] Other - dry scabs on legs    Patient is medically stable for discharge to home with outpatient follow up.  ---  CONSULTANTS:   ID: Dr. Perea  ---  TIME SPENT:  I, the attending physician, was physically present for the key portions of the evaluation and management (E/M) service provided. 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 History taken via phone from wife, pt was not proving information at the moment of the exam. Pt was recently admitted from 6/17 to 6/23 for Covid infection and Afib with RVR. Wife reports that today pt started to have sweating, not feeling good, increased cough, 1 episode of diarrhea and vomiting, and she decided to brought him in. She states that he denied SOB, and his mental status was at baseline.  She also reports that after the discharge pt was doing in general good with persistent mild cough. He had nurse evaluation at home 2 days ago and was doing good and yesterday was seen by neuro with no acute concerns.     ED course:   - Vitals: 144/93, , RR 16, temp 98.2 < 101.8, O2 sat 95% with NC 4lt   - Labs: WBC 27.28, N 93%. Hb 15.2. INR 1.37. Na 146.  BUN 21/Cr 1.3.  Glucose 162.  Lactate 2.6  - CT CAP: Mild-to-moderate patchy airspace disease or consolidation both lower lobes. Mild airspace disease and/or atelectasis at the lingula.  (It is a preliminary result)  - EKG: Afib , right axis deviation, RV hypertrophy septal and inferior infarct age undeterm.   - s/p Zosyn and vanco x1 dose, NS 2lt bolus, Tylenol suppository, Zofran IV x1.  (01 Jul 2022 00:35)      ---  HOSPITAL COURSE:   Pt presented to the ED with increasing cough and sweating, admitted for sepsis 2/2 PNA. CT CAP showed mild-to-moderate patchy airspace disease or consolidation both lower lobes. Mild airspace disease and/or atelectasis at the lingula. Infectious Disease consulted, patient started on IV cefepime & Vanco with improvement in clinical condition. MRSA PCR+, patient started on course of nasal Bactroban. Blood cultures negative x4, urine culture neg. CT abd/p on admission showed Uncinate process acinar fullness, cannot exclude pancreatic lesion. Pt had CT A/P with IV Contrast for further eval on 7/5/22 that demonstrated fullness at the pancreatic uncinate process with masslike appearance. Whether this represents normal confluent pancreatic parenchyma tissue or mass lesion is unclear based on this study. Recommend further evaluation with abdominal MRI pancreas protocol for characterization. Improved bilateral lower lobe opacities since 6/30/2022, which may reflect atelectasis or residual/ resolving infection. Trace pleural effusions. Further eval with MRCP showed 2.0 x 1.2 cm structure that is slightly T2 hyperintense, and T1 hyperintense on precontrast fat-suppressed T1-weighted gradient echo   images. This structure enhances similarly from that of the normal pancreatic parenchyma on subtraction images. Findings may represent normal pancreatic parenchyma with less interspersed fat, or may represent a nonspecific mass lesion. Correlation with tumor markers is recommended. If clinically indicated, endoscopic ultrasound may be pursued for further evaluation.+Small gallstones in the gallbladder. No evidence for thickened gallbladder wall or pericholecystic fluid. +9 mm left adrenal lesion as described above, suggestive of a small hemorrhage.Trace perisplenic ascites. Small right pleural effusion. Patient underwent SS eval for concern for aspiration. MBS resulted in recommendation of regular diet in small cup sips. Pt was transitioned to from IV to PO antibiotics and is ready for discharge home. Patient was medically optimized and improved clinically throughout hospital course. Patient seen and examined on day of discharge..    Vital Signs Last 24 Hrs  T(C): 37.1 (06 Jul 2022 04:45), Max: 37.2 (05 Jul 2022 19:59)  T(F): 98.7 (06 Jul 2022 04:45), Max: 98.9 (05 Jul 2022 19:59)  HR: 87 (06 Jul 2022 04:45) (75 - 87)  BP: 157/86 (06 Jul 2022 04:45) (140/93 - 157/86)  BP(mean): --  RR: 18 (06 Jul 2022 04:45) (18 - 18)  SpO2: 96% (06 Jul 2022 04:45) (96% - 97%)    PHYSICAL EXAM:  GENERAL:  [ X ] NAD, [X  ] Well appearing, [  ] Agitated, [  ] Drowsy, [  ] Lethargy, [  ] Confused   HEAD:  [X  ] Normal, [  ] Other  EYES:  [X  ] EOMI, [X  ] PERRLA, [X  ] Conjunctiva and sclera clear normal, [  ] Other, [  ] Pallor, [  ] Discharge  ENMT:  [ X ] Normal, [x  ] Moist mucous membranes, [ x ] Good dentition, [ x ] No thrush  NECK:  [X  ] Supple, [ x ] No JVD, [ x ] Normal thyroid, [  ] Lymphadenopathy, [  ] Other  CHEST/LUNG:  [x  ] Clear to auscultation bilaterally, [X  ] Breath Sounds equal B/L / decreased, [ X ] Poor effort, [X  ] No rales, [X  ] No rhonchi, [ X ] No wheezing  HEART:  [ X ] Regular rate and rhythm, [  ] Tachycardia, [  ] Bradycardia, [  ] Irregular, [ X ] No murmurs, No rubs, No gallops, [  ] PPM in place (Mfr:  )  ABDOMEN:  [ X ] Soft, [X  ] Nontender, [X] Nondistended, [X  ] No mass, [ X ] Bowel sounds present, [  ] Obese  NERVOUS SYSTEM:  [X  ] Alert & Oriented x3, [  ] Nonfocal, [  ] Confusion, [  ] Encephalopathic, [  ] Sedated, [  ] Unable to assess, [  ] Dementia, [x  ] Other-B/L lower Ext Contractures, B/L Hand Contractures  EXTREMITIES:  [ X ] 2+ Peripheral Pulses, No clubbing, No cyanosis,  No edema, [  ] PVD stasis skin changes B/L lower EXT, [  ] Wound  LYMPH:  No lymphadenopathy noted  SKIN:  [ x ] No rashes or lesions, [  ] Pressure ulcers, [  ] Ecchymosis, [  ] Skin tears, [x  ] Other - dry scabs on legs    Patient is medically stable for discharge to home with outpatient follow up.  ---  CONSULTANTS:   ID: Dr. Perea  ---  TIME SPENT:  I, the attending physician, was physically present for the key portions of the evaluation and management (E/M) service provided. 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 History taken via phone from wife, pt was not proving information at the moment of the exam. Pt was recently admitted from 6/17 to 6/23 for Covid infection and Afib with RVR. Wife reports that today pt started to have sweating, not feeling good, increased cough, 1 episode of diarrhea and vomiting, and she decided to brought him in. She states that he denied SOB, and his mental status was at baseline.  She also reports that after the discharge pt was doing in general good with persistent mild cough. He had nurse evaluation at home 2 days ago and was doing good and yesterday was seen by neuro with no acute concerns.     ED course:   - Vitals: 144/93, , RR 16, temp 98.2 < 101.8, O2 sat 95% with NC 4lt   - Labs: WBC 27.28, N 93%. Hb 15.2. INR 1.37. Na 146.  BUN 21/Cr 1.3.  Glucose 162.  Lactate 2.6  - CT CAP: Mild-to-moderate patchy airspace disease or consolidation both lower lobes. Mild airspace disease and/or atelectasis at the lingula.  (It is a preliminary result)  - EKG: Afib , right axis deviation, RV hypertrophy septal and inferior infarct age undeterm.   - s/p Zosyn and vanco x1 dose, NS 2lt bolus, Tylenol suppository, Zofran IV x1.  (01 Jul 2022 00:35)      ---  HOSPITAL COURSE:   Pt presented to the ED with increasing cough and sweating, admitted for sepsis 2/2 PNA. CT CAP showed mild-to-moderate patchy airspace disease or consolidation both lower lobes. Mild airspace disease and/or atelectasis at the lingula. Infectious Disease consulted, patient started on IV cefepime & Vanco with improvement in clinical condition. MRSA PCR+, patient started on course of nasal Bactroban. Blood cultures negative x4, urine culture neg. CT abd/p on admission showed Uncinate process acinar fullness, cannot exclude pancreatic lesion. Pt had CT A/P with IV Contrast for further eval on 7/5/22 that demonstrated fullness at the pancreatic uncinate process with masslike appearance. Whether this represents normal confluent pancreatic parenchyma tissue or mass lesion is unclear based on this study. Recommend further evaluation with abdominal MRI pancreas protocol for characterization. Improved bilateral lower lobe opacities since 6/30/2022, which may reflect atelectasis or residual/ resolving infection. Trace pleural effusions. Further eval with MRCP showed 2.0 x 1.2 cm structure that is slightly T2 hyperintense, and T1 hyperintense on precontrast fat-suppressed T1-weighted gradient echo   images. This structure enhances similarly from that of the normal pancreatic parenchyma on subtraction images. Findings may represent normal pancreatic parenchyma with less interspersed fat, or may represent a nonspecific mass lesion. Correlation with tumor markers is recommended. If clinically indicated, endoscopic ultrasound may be pursued for further evaluation.+Small gallstones in the gallbladder. No evidence for thickened gallbladder wall or pericholecystic fluid. +9 mm left adrenal lesion as described above, suggestive of a small hemorrhage.Trace perisplenic ascites. Small right pleural effusion. Patient underwent SS eval for concern for aspiration. MBS resulted in recommendation of regular diet in small cup sips. Pt was transitioned to from IV to PO antibiotics and is ready for discharge home. Patient was medically optimized and improved clinically throughout hospital course. Patient seen and examined on day of discharge.. Pt ad his CT A/P with IV Contrast for Pancreatic  head fullness r/o Mass --> MRCP with & With out contrast done , MBS done, Pt completed IV Cefepime & Vanco in hospital with 1 day of PO ABx as d/w ID Dr ANA Bhardwaj. stable for d/c home with Home care.    CT A/P   IMPRESSION:    Again, fullness is noted at the pancreatic uncinate process with masslike   appearance. Whether this represents normal confluent pancreatic   parenchyma tissue or mass lesion is unclear based on this study.   Recommend further evaluation with abdominal MRI pancreas protocol for   characterization.    Improved bilateral lower lobe opacities since 6/30/2022, which may   reflect atelectasis or residual/ resolving infection. Trace pleural   effusions.    Question urinary tract infection/cystitis. Correlate with urinalysis.       MRCP:    IMPRESSION:  2.0 x 1.2 cm structure that is slightly T2 hyperintense, and T1   hyperintense on precontrast fat-suppressed T1-weighted gradient echo   images. This structure enhances similarly from that of the normal   pancreatic parenchyma on subtraction images. Findings may represent   normal pancreatic parenchyma with less interspersed fat, or may represent   a nonspecific mass lesion. Correlation with tumor markers is recommended.   If clinically indicated, endoscopic ultrasound may be pursued for further   evaluation.    Small gallstones in the gallbladder. No evidence for thickened   gallbladder wall or pericholecystic fluid.    9 mm left adrenal lesion as described above, suggestive of a small   hemorrhage.    Trace perisplenic ascites.    Small right pleural effusion.    Patient is medically stable for discharge to home with outpatient follow up.  ---  CONSULTANTS:   ID: Dr. Perea  ---  TIME SPENT:  I, the attending physician, was physically present for the key portions of the evaluation and management (E/M) service provided. 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 History taken via phone from wife, pt was not proving information at the moment of the exam. Pt was recently admitted from 6/17 to 6/23 for Covid infection and Afib with RVR. Wife reports that today pt started to have sweating, not feeling good, increased cough, 1 episode of diarrhea and vomiting, and she decided to brought him in. She states that he denied SOB, and his mental status was at baseline.  She also reports that after the discharge pt was doing in general good with persistent mild cough. He had nurse evaluation at home 2 days ago and was doing good and yesterday was seen by neuro with no acute concerns.     ED course:   - Vitals: 144/93, , RR 16, temp 98.2 < 101.8, O2 sat 95% with NC 4lt   - Labs: WBC 27.28, N 93%. Hb 15.2. INR 1.37. Na 146.  BUN 21/Cr 1.3.  Glucose 162.  Lactate 2.6  - CT CAP: Mild-to-moderate patchy airspace disease or consolidation both lower lobes. Mild airspace disease and/or atelectasis at the lingula.  (It is a preliminary result)  - EKG: Afib , right axis deviation, RV hypertrophy septal and inferior infarct age undeterm.   - s/p Zosyn and vanco x1 dose, NS 2lt bolus, Tylenol suppository, Zofran IV x1.  (01 Jul 2022 00:35)      ---  HOSPITAL COURSE:   Pt presented to the ED with increasing cough and sweating, admitted for sepsis 2/2 PNA. CT CAP showed mild-to-moderate patchy airspace disease or consolidation both lower lobes. Mild airspace disease and/or atelectasis at the lingula. Infectious Disease consulted, patient started on IV cefepime & Vanco with improvement in clinical condition. MRSA PCR+, patient started on course of nasal Bactroban. Blood cultures negative x4, urine culture neg. CT abd/p on admission showed Uncinate process acinar fullness, cannot exclude pancreatic lesion. Pt had CT A/P with IV Contrast for further eval on 7/5/22 that demonstrated fullness at the pancreatic uncinate process with masslike appearance. Whether this represents normal confluent pancreatic parenchyma tissue or mass lesion is unclear based on this study. Recommend further evaluation with abdominal MRI pancreas protocol for characterization. Improved bilateral lower lobe opacities since 6/30/2022, which may reflect atelectasis or residual/ resolving infection. Trace pleural effusions. Further eval with MRCP showed 2.0 x 1.2 cm structure that is slightly T2 hyperintense, and T1 hyperintense on precontrast fat-suppressed T1-weighted gradient echo   images. This structure enhances similarly from that of the normal pancreatic parenchyma on subtraction images. Findings may represent normal pancreatic parenchyma with less interspersed fat, or may represent a nonspecific mass lesion. Correlation with tumor markers is recommended. If clinically indicated, endoscopic ultrasound may be pursued for further evaluation.+Small gallstones in the gallbladder. No evidence for thickened gallbladder wall or pericholecystic fluid. +9 mm left adrenal lesion as described above, suggestive of a small hemorrhage.Trace perisplenic ascites. Small right pleural effusion. Patient underwent SS eval for concern for aspiration. MBS resulted in recommendation of regular diet in small cup sips. Pt was transitioned to from IV to PO antibiotics and is ready for discharge home. Patient was medically optimized and improved clinically throughout hospital course. Patient seen and examined on day of discharge.. Pt ad his CT A/P with IV Contrast for Pancreatic  head fullness r/o Mass --> MRCP with & With out contrast done , MBS done, Pt completed IV Cefepime & Vanco in hospital with 1 day of PO ABx as d/w ID Dr ANA Bhardwaj. stable for d/c home with Home care. MRI Results d/w wife at detail & d/w her to call PMD who is GI to follow up for further work up-Tumor markers & OR EUS biopsy as out pt. stable for d/c home today ,HHA resumed.    CT A/P   IMPRESSION:    Again, fullness is noted at the pancreatic uncinate process with masslike   appearance. Whether this represents normal confluent pancreatic   parenchyma tissue or mass lesion is unclear based on this study.   Recommend further evaluation with abdominal MRI pancreas protocol for   characterization.    Improved bilateral lower lobe opacities since 6/30/2022, which may   reflect atelectasis or residual/ resolving infection. Trace pleural   effusions.    Question urinary tract infection/cystitis. Correlate with urinalysis.       MRCP:    IMPRESSION:  2.0 x 1.2 cm structure that is slightly T2 hyperintense, and T1   hyperintense on precontrast fat-suppressed T1-weighted gradient echo   images. This structure enhances similarly from that of the normal   pancreatic parenchyma on subtraction images. Findings may represent   normal pancreatic parenchyma with less interspersed fat, or may represent   a nonspecific mass lesion. Correlation with tumor markers is recommended.   If clinically indicated, endoscopic ultrasound may be pursued for further   evaluation.    Small gallstones in the gallbladder. No evidence for thickened   gallbladder wall or pericholecystic fluid.    9 mm left adrenal lesion as described above, suggestive of a small   hemorrhage.    Trace perisplenic ascites.    Small right pleural effusion.    Patient is medically stable for discharge to home with outpatient follow up.  ---  CONSULTANTS:   ID: Dr. Perea  ---  TIME SPENT:  I, the attending physician, was physically present for the key portions of the evaluation and management (E/M) service provided. The total amount of time spent reviewing the hospital notes, laboratory values, imaging findings, assessing/counseling the patient, discussing with consultant physicians, social work, nursing staff was 60 minutes    ---

## 2022-07-05 NOTE — PROGRESS NOTE ADULT - PROBLEM SELECTOR PLAN 2
- Chronic. RVR in the setting of fever and dehydration. -RESOLVED  - TTE 6/15/22: Mild LV hypertrophy, normal systolic function, eLVEF of 60%.Grossly normal RV.  LA enlargement. Trace to mild MR / Trace TR.  - Continue home Cardizem CD  120 qd with hold parameters.   - Continue home Eliquis 2.5 mg 2x day   - Monitor in tele: No events overnight, afib 70-80s < from: CT Abdomen and Pelvis No Cont (06.30.22 @ 23:35) >  IMPRESSION:  1.  Bilateral lower lobe bronchopneumonia.  2.  Uncinate pancreas acinar fullness compared to remainder of gland   (2/85). The pancreas lesion is not excludable. Consider contrast-enhanced   exam.    -will order CT A/P with IV contrast to r/o pancreatic lesion

## 2022-07-05 NOTE — SWALLOW VFSS/MBS ASSESSMENT ADULT - RECOMMENDED CONSISTENCY
1. Regular and thin liquids, via single/small cup sips only, as tolerated  2. Slow pacing, single/small bites/sips, and alternate solids with liquids  3. Maintain upright position during all meals  4. Maintain aspiration precautions  5. Maintain strict oral care

## 2022-07-05 NOTE — SWALLOW VFSS/MBS ASSESSMENT ADULT - RECOMMENDED FEEDING/EATING TECHNIQUES
allow for swallow between intakes/alternate food with liquid/check mouth frequently for oral residue/pocketing/maintain upright posture during/after eating for 30 mins/no straws/oral hygiene/position upright (90 degrees)/provide rest periods between swallows/small sips/bites

## 2022-07-05 NOTE — PROGRESS NOTE ADULT - PROBLEM SELECTOR PLAN 6
Resolved  - BUN 21/Cr 1.3 on admission.  Baseline Cr 0.9  - LATOYA 2/2 current sepsis, dehydration   - Cr 1.0 today  - s/p 2 lt NS bolus.  - Restart  losartan for now   - Encourage fluid intake.   - Monitor and trend Cr. - pt is bed and wheelchair bound, severe persisting peripheral neuropathy 2/2 prior traumatic brain bleed on Eliquis.   - Fall, aspiration, safety precautions  - MBS ordered 7/5/22***  - At home on gabapentin 300 mg TID, buproprion, tizanidine 4 mg TID, and ritalin qd. Continue home meds.   - Bedside PT  Pt Chronically bed and wheelchair bound which predisposes him for atelectasis and superinfection s/p Covid.

## 2022-07-05 NOTE — SWALLOW VFSS/MBS ASSESSMENT ADULT - DIAGNOSTIC IMPRESSIONS
1. Patient demonstrates a mild oral dysphagia for pureed, regular solids, moderately thick, mildly thick, and thin liquids marked by adequate oral acceptance with brief bolus hold resulting in delayed collection, transport, and AP transit time. Trace lingual and palatal stasis visualized post swallow, which reduced with subsequent swallow.   2. Patient demonstrates a mild pharyngeal dysphagia for pureed, regular solids, moderately thick, mildly thick, and thin liquids marked by timely pharyngeal swallow trigger (delayed swallow trigger visualized with mildly thick and thin liquids) with reduced base of tongue retraction, reduced hyolaryngeal elevation, adequate epiglottic deflection, and reduced pharyngeal contractility resulting in trace stasis at the BOT and vallecula, which reduced with subsequent swallow. No laryngeal penetration/aspiration was visualized with pureed, regular solids, moderately thick, mildly thick, and thin liquids. Of note, baseline coughing was observed s/p first PO trial of mildly thick liquids; however, when fluoro was turned back on, no barium was observed in the laryngeal vestibule.

## 2022-07-05 NOTE — SWALLOW VFSS/MBS ASSESSMENT ADULT - ORAL PHASE
Delayed oral transit time/Reduced anterior - posterior transport Delayed oral transit time/Reduced anterior - posterior transport/Residue in oral cavity

## 2022-07-05 NOTE — DISCHARGE NOTE PROVIDER - CARE PROVIDERS DIRECT ADDRESSES
[email or web address]

## 2022-07-05 NOTE — PROGRESS NOTE ADULT - PROBLEM SELECTOR PLAN 1
- Pt recently DC s/p covid infection now with superimposed PNA -Bacterial infection,   - Meet sepsis criteria with fever, tachycardia, leukocytosis.  WBC Resolved   - CT CAP: Mild-to-moderate patchy airspace disease or consolidation both lower lobes. Mild airspace disease and/or atelectasis at the lingula.  (It is a preliminary result)  - Continue  Vanco & Start Cefepime as d/w ID DR ANA Bhardwaj 5-7 days Abx   - procal 4.05, lactate wnl 7/2/22  - MRSA PCR +, mupirocin nasal ordered  - blood cultures x 2- NGTD 7/2/22.  - Incentive spirometry , Inh PRN - Pt recently DC s/p covid infection now with superimposed PNA -Bacterial infection,   - Meet sepsis criteria with fever, tachycardia, leukocytosis.  WBC Resolved   - CT CAP: Mild-to-moderate patchy airspace disease or consolidation both lower lobes. Mild airspace disease and/or atelectasis at the lingula.  (It is a preliminary result)  - Continue  Vanco & Start Cefepime as d/w ID DR ANA Bhardwaj 5-7 days Abx - will f/u with ID to confirm duration/switch to oral abx.  - procal 4.05, lactate wnl 7/2/22  - MRSA PCR +, mupirocin nasal ordered  - blood cultures x 2- NGTD 7/5/22.  - Incentive spirometry , Inh PRN

## 2022-07-05 NOTE — DISCHARGE NOTE PROVIDER - NSDCHHNEEDSERVICE_GEN_ALL_CORE
Medication teaching and assessment/Observation and assessment/Wound care and assessment/Other, specify... Medication teaching and assessment/Observation and assessment/Rehabilitation services/Other, specify...

## 2022-07-05 NOTE — DISCHARGE NOTE NURSING/CASE MANAGEMENT/SOCIAL WORK - PATIENT PORTAL LINK FT
You can access the FollowMyHealth Patient Portal offered by [facility] by registering at the following website: [email or web address]. By joining [employer]’s FollowMyHealth portal, you will also be able to view your health information using other applications (apps) compatible with our system.

## 2022-07-05 NOTE — PROGRESS NOTE ADULT - PROBLEM SELECTOR PLAN 4
- As per wife no acute/new chest pain   - Normal troponin  - Continue home statin - pharm interchange for rosuvastatin, and ASA   - Monitor in tele. - Chronic and stable   - Medication adjusted during the last admission   - Continue home Cardizem CD  120 with hold parameters.   - Losartan 25 qd restart

## 2022-07-05 NOTE — DISCHARGE NOTE PROVIDER - CARE PROVIDER_API CALL
Richard Mathis (MD)  Gastroenterology; Internal Medicine  [address]  [place] [address]  Phone: [phone]  Fax: [phone]  Follow Up Time:    Richard Mathis)  Gastroenterology; Internal Medicine  [address]  [place] [address]  Phone: [phone]  Fax: [phone]  Follow Up Time:     Mirta Goldsmith)  Internal Medicine  [address] [place] [address]  Phone: [phone]  Fax: [phone]  Follow Up Time: 1 week

## 2022-07-06 VITALS
RESPIRATION RATE: 18 BRPM | HEART RATE: 70 BPM | SYSTOLIC BLOOD PRESSURE: 119 MMHG | TEMPERATURE: 98 F | DIASTOLIC BLOOD PRESSURE: 79 MMHG | OXYGEN SATURATION: 97 %

## 2022-07-06 LAB
ANION GAP SERPL CALC-SCNC: 7 MMOL/L — SIGNIFICANT CHANGE UP (ref 5–17)
BUN SERPL-MCNC: 10 MG/DL — SIGNIFICANT CHANGE UP (ref 7–23)
CALCIUM SERPL-MCNC: 9.1 MG/DL — SIGNIFICANT CHANGE UP (ref 8.5–10.1)
CHLORIDE SERPL-SCNC: 108 MMOL/L — SIGNIFICANT CHANGE UP (ref 96–108)
CO2 SERPL-SCNC: 26 MMOL/L — SIGNIFICANT CHANGE UP (ref 22–31)
CREAT SERPL-MCNC: 0.78 MG/DL — SIGNIFICANT CHANGE UP (ref 0.5–1.3)
CULTURE RESULTS: SIGNIFICANT CHANGE UP
CULTURE RESULTS: SIGNIFICANT CHANGE UP
EGFR: 96 ML/MIN/1.73M2 — SIGNIFICANT CHANGE UP
GLUCOSE SERPL-MCNC: 142 MG/DL — HIGH (ref 70–99)
HCT VFR BLD CALC: 39.5 % — SIGNIFICANT CHANGE UP (ref 39–50)
HGB BLD-MCNC: 13 G/DL — SIGNIFICANT CHANGE UP (ref 13–17)
MCHC RBC-ENTMCNC: 26.1 PG — LOW (ref 27–34)
MCHC RBC-ENTMCNC: 32.9 GM/DL — SIGNIFICANT CHANGE UP (ref 32–36)
MCV RBC AUTO: 79.2 FL — LOW (ref 80–100)
NRBC # BLD: 0 /100 WBCS — SIGNIFICANT CHANGE UP (ref 0–0)
PLATELET # BLD AUTO: 199 K/UL — SIGNIFICANT CHANGE UP (ref 150–400)
POTASSIUM SERPL-MCNC: 3.7 MMOL/L — SIGNIFICANT CHANGE UP (ref 3.5–5.3)
POTASSIUM SERPL-SCNC: 3.7 MMOL/L — SIGNIFICANT CHANGE UP (ref 3.5–5.3)
RBC # BLD: 4.99 M/UL — SIGNIFICANT CHANGE UP (ref 4.2–5.8)
RBC # FLD: 14.8 % — HIGH (ref 10.3–14.5)
SODIUM SERPL-SCNC: 141 MMOL/L — SIGNIFICANT CHANGE UP (ref 135–145)
SPECIMEN SOURCE: SIGNIFICANT CHANGE UP
SPECIMEN SOURCE: SIGNIFICANT CHANGE UP
WBC # BLD: 10.57 K/UL — HIGH (ref 3.8–10.5)
WBC # FLD AUTO: 10.57 K/UL — HIGH (ref 3.8–10.5)

## 2022-07-06 PROCEDURE — 87641 MR-STAPH DNA AMP PROBE: CPT

## 2022-07-06 PROCEDURE — A9579: CPT

## 2022-07-06 PROCEDURE — 71250 CT THORAX DX C-: CPT | Mod: MG

## 2022-07-06 PROCEDURE — 87640 STAPH A DNA AMP PROBE: CPT

## 2022-07-06 PROCEDURE — 92610 EVALUATE SWALLOWING FUNCTION: CPT

## 2022-07-06 PROCEDURE — 85025 COMPLETE CBC W/AUTO DIFF WBC: CPT

## 2022-07-06 PROCEDURE — 0225U NFCT DS DNA&RNA 21 SARSCOV2: CPT

## 2022-07-06 PROCEDURE — 85730 THROMBOPLASTIN TIME PARTIAL: CPT

## 2022-07-06 PROCEDURE — 74176 CT ABD & PELVIS W/O CONTRAST: CPT | Mod: ME

## 2022-07-06 PROCEDURE — 87086 URINE CULTURE/COLONY COUNT: CPT

## 2022-07-06 PROCEDURE — 81001 URINALYSIS AUTO W/SCOPE: CPT

## 2022-07-06 PROCEDURE — 99285 EMERGENCY DEPT VISIT HI MDM: CPT

## 2022-07-06 PROCEDURE — 83605 ASSAY OF LACTIC ACID: CPT

## 2022-07-06 PROCEDURE — 80053 COMPREHEN METABOLIC PANEL: CPT

## 2022-07-06 PROCEDURE — 96374 THER/PROPH/DIAG INJ IV PUSH: CPT

## 2022-07-06 PROCEDURE — 93005 ELECTROCARDIOGRAM TRACING: CPT

## 2022-07-06 PROCEDURE — 94640 AIRWAY INHALATION TREATMENT: CPT

## 2022-07-06 PROCEDURE — 74177 CT ABD & PELVIS W/CONTRAST: CPT

## 2022-07-06 PROCEDURE — 92611 MOTION FLUOROSCOPY/SWALLOW: CPT

## 2022-07-06 PROCEDURE — 85610 PROTHROMBIN TIME: CPT

## 2022-07-06 PROCEDURE — 87040 BLOOD CULTURE FOR BACTERIA: CPT

## 2022-07-06 PROCEDURE — A9698: CPT

## 2022-07-06 PROCEDURE — 80048 BASIC METABOLIC PNL TOTAL CA: CPT

## 2022-07-06 PROCEDURE — 80202 ASSAY OF VANCOMYCIN: CPT

## 2022-07-06 PROCEDURE — 74230 X-RAY XM SWLNG FUNCJ C+: CPT

## 2022-07-06 PROCEDURE — G1004: CPT

## 2022-07-06 PROCEDURE — 84145 PROCALCITONIN (PCT): CPT

## 2022-07-06 PROCEDURE — 96361 HYDRATE IV INFUSION ADD-ON: CPT

## 2022-07-06 PROCEDURE — 74183 MRI ABD W/O CNTR FLWD CNTR: CPT | Mod: 26

## 2022-07-06 PROCEDURE — 74183 MRI ABD W/O CNTR FLWD CNTR: CPT

## 2022-07-06 PROCEDURE — 71045 X-RAY EXAM CHEST 1 VIEW: CPT

## 2022-07-06 PROCEDURE — 85027 COMPLETE CBC AUTOMATED: CPT

## 2022-07-06 PROCEDURE — 36415 COLL VENOUS BLD VENIPUNCTURE: CPT

## 2022-07-06 RX ORDER — SENNA PLUS 8.6 MG/1
2 TABLET ORAL
Qty: 0 | Refills: 0 | DISCHARGE
Start: 2022-07-06

## 2022-07-06 RX ORDER — CEFUROXIME AXETIL 250 MG
500 TABLET ORAL EVERY 12 HOURS
Refills: 0 | Status: DISCONTINUED | OUTPATIENT
Start: 2022-07-06 | End: 2022-07-06

## 2022-07-06 RX ORDER — SODIUM CHLORIDE 0.65 %
2 AEROSOL, SPRAY (ML) NASAL
Qty: 0 | Refills: 0 | DISCHARGE
Start: 2022-07-06

## 2022-07-06 RX ORDER — CEFUROXIME AXETIL 250 MG
1 TABLET ORAL
Qty: 0 | Refills: 0 | DISCHARGE
Start: 2022-07-06

## 2022-07-06 RX ORDER — CEFUROXIME AXETIL 250 MG
1 TABLET ORAL
Qty: 2 | Refills: 0
Start: 2022-07-06 | End: 2022-07-06

## 2022-07-06 RX ORDER — SOD,AMMONIUM,POTASSIUM LACTATE
1 CREAM (GRAM) TOPICAL
Qty: 0 | Refills: 0 | DISCHARGE
Start: 2022-07-06

## 2022-07-06 RX ORDER — CEFUROXIME AXETIL 250 MG
1 TABLET ORAL
Qty: 4 | Refills: 0
Start: 2022-07-06 | End: 2022-07-07

## 2022-07-06 RX ADMIN — Medication 81 MILLIGRAM(S): at 09:04

## 2022-07-06 RX ADMIN — BUPROPION HYDROCHLORIDE 150 MILLIGRAM(S): 150 TABLET, EXTENDED RELEASE ORAL at 09:04

## 2022-07-06 RX ADMIN — TIZANIDINE 4 MILLIGRAM(S): 4 TABLET ORAL at 05:21

## 2022-07-06 RX ADMIN — MUPIROCIN 1 APPLICATION(S): 20 OINTMENT TOPICAL at 05:22

## 2022-07-06 RX ADMIN — Medication 120 MILLIGRAM(S): at 05:21

## 2022-07-06 RX ADMIN — Medication 500 MILLIGRAM(S): at 16:08

## 2022-07-06 RX ADMIN — GABAPENTIN 300 MILLIGRAM(S): 400 CAPSULE ORAL at 05:21

## 2022-07-06 RX ADMIN — APIXABAN 2.5 MILLIGRAM(S): 2.5 TABLET, FILM COATED ORAL at 05:21

## 2022-07-06 RX ADMIN — TIZANIDINE 4 MILLIGRAM(S): 4 TABLET ORAL at 13:00

## 2022-07-06 RX ADMIN — LOSARTAN POTASSIUM 25 MILLIGRAM(S): 100 TABLET, FILM COATED ORAL at 05:21

## 2022-07-06 RX ADMIN — GABAPENTIN 300 MILLIGRAM(S): 400 CAPSULE ORAL at 13:00

## 2022-07-06 RX ADMIN — Medication 20 MILLIGRAM(S): at 09:04

## 2022-07-06 RX ADMIN — Medication 250 MILLIGRAM(S): at 01:45

## 2022-07-06 RX ADMIN — Medication 10 MILLIGRAM(S): at 13:00

## 2022-07-06 RX ADMIN — CEFEPIME 100 MILLIGRAM(S): 1 INJECTION, POWDER, FOR SOLUTION INTRAMUSCULAR; INTRAVENOUS at 09:03

## 2022-07-06 RX ADMIN — Medication 10 MILLIGRAM(S): at 05:21

## 2022-07-06 NOTE — PROGRESS NOTE ADULT - SUBJECTIVE AND OBJECTIVE BOX
Patient is a 72y old  Male who presents with a chief complaint of Sepsis     (04 Jul 2022 11:30)    HPI:  Pt is a 72 y.o male with a PMH of HTN, AFIB, BPH, bedbound 2/2 TBI, HLD, CAD, peripheral neuropathy, glaucoma, who presented to the ED with increasing cough and sweating. History taken via phone from wife, pt was not proving information at the moment of the exam. Pt was recently admitted from 6/17 to 6/23 for Covid infection and Afib with RVR. Wife reports that today pt started to have sweating, not feeling good, increased cough, 1 episode of diarrhea and vomiting, and she decided to brought him in. She states that he denied SOB, and his mental status was at baseline.  She also reports that after the discharge pt was doing in general good with persistent mild cough. He had nurse evaluation at home 2 days ago and was doing good and yesterday was seen by neuro with no acute concerns.     ED course:   - Vitals: 144/93, , RR 16, temp 98.2 < 101.8, O2 sat 95% with NC 4lt   - Labs: WBC 27.28, N 93%. Hb 15.2. INR 1.37. Na 146.  BUN 21/Cr 1.3.  Glucose 162.  Lactate 2.6  - CT CAP: Mild-to-moderate patchy airspace disease or consolidation both lower lobes. Mild airspace disease and/or atelectasis at the lingula.  (It is a preliminary result)  - EKG: Afib , right axis deviation, RV hypertrophy septal and inferior infarct age undeterm.   - s/p Zosyn and vanco x1 dose, NS 2lt bolus, Tylenol suppository, Zofran IV x1.  (01 Jul 2022 00:35)    INTERVAL HPI:  ________  7/1/22: pt seen, Examined, feels much better, on IV cefepime & Vanco, WBC elevated, seen by ID, No complaints  7/2/22: Pt seen, examined. feels the same as yesterday. Endorses a productive cough, but only complaint is that the food is not good. No complaints at this time. Continuing with IV cefepime & vanco., WBC -Nl , Mild Anemia ,  7/3/22: Pt seen and examined at bedside. He states he feels well, has a good appetite but would be even better if the food was good. No complaints at this time. Continue IV cefepime & vanco. WBC wnl.  7/4/22:Pt seen, examined, at bedside, WBC stable ,On IV Abx ,Off Isolation. MBS plan in AM , D/C Plan   7/5/22- Pt seen and examined at bedside. CT scan A/P -done, Plan for MRI MRCP to evaluate Pancreas.   7/6/22- *****    OVERNIGHT EVENTS: No acute overnight events.     Home Medications:  aspirin 81 mg oral tablet: 1 tab(s) orally once a day (01 Jul 2022 02:13)  bethanechol 10 mg oral tablet: 1 tab(s) orally 3 times a day at (8am, 3pm, 10pm) (01 Jul 2022 02:13)  buPROPion 75 mg oral tablet: 1 tab(s) orally 2 times a day (10am, 10pm) (01 Jul 2022 02:13)  Eliquis 2.5 mg oral tablet: 1 tab(s) orally 2 times a day (10am, 10pm)t (01 Jul 2022 02:13)  gabapentin 300 mg oral capsule: 1 cap(s) orally 3 times a day (8am, 3pm, 10pm (01 Jul 2022 02:13)  latanoprost 0.005% ophthalmic solution: 1 drop(s) to each affected eye once a day (in the evening) (01 Jul 2022 02:13)  methylphenidate 10 mg oral tablet: 2 tab(s) orally once a day (in the morning) (01 Jul 2022 02:13)  rosuvastatin 10 mg oral tablet: 1 tab(s) orally once a day (01 Jul 2022 02:13)  tamsulosin 0.4 mg oral capsule: 1 cap(s) orally once a day (in the evening) (01 Jul 2022 02:13)  tiZANidine 4 mg oral tablet: 1 tab(s) orally 3 times a day (01 Jul 2022 02:13)  Vitamin D3 25 mcg (1000 intl units) oral tablet: 1 tab(s) orally once a day (01 Jul 2022 02:13)      MEDICATIONS  (STANDING):  ALBUTerol    90 MICROgram(s) HFA Inhaler 1 Puff(s) Inhalation three times a day  ammonium lactate 12% Lotion 1 Application(s) Topical two times a day  apixaban 2.5 milliGRAM(s) Oral every 12 hours  aspirin  chewable 81 milliGRAM(s) Oral daily  atorvastatin 40 milliGRAM(s) Oral at bedtime  bethanechol 10 milliGRAM(s) Oral three times a day  buPROPion XL (24-Hour) . 150 milliGRAM(s) Oral daily  cefepime   IVPB 2000 milliGRAM(s) IV Intermittent every 12 hours  dextrose 5%. 1000 milliLiter(s) (50 mL/Hr) IV Continuous <Continuous>  diltiazem    milliGRAM(s) Oral daily  gabapentin 300 milliGRAM(s) Oral three times a day  latanoprost 0.005% Ophthalmic Solution 1 Drop(s) Both EYES at bedtime  losartan 25 milliGRAM(s) Oral daily  methylphenidate 20 milliGRAM(s) Oral <User Schedule>  mupirocin 2% Nasal 1 Application(s) Both Nostrils two times a day  senna 2 Tablet(s) Oral at bedtime  tamsulosin 0.4 milliGRAM(s) Oral at bedtime  tiZANidine 4 milliGRAM(s) Oral three times a day  vancomycin  IVPB 1000 milliGRAM(s) IV Intermittent every 24 hours    MEDICATIONS  (PRN):  acetaminophen     Tablet .. 650 milliGRAM(s) Oral every 6 hours PRN Temp greater or equal to 38C (100.4F), Mild Pain (1 - 3)  benzonatate 100 milliGRAM(s) Oral three times a day PRN Cough  sodium chloride 0.65% Nasal 1 Spray(s) Both Nostrils four times a day PRN Nasal Congestion      No Known Allergies      Social History:  Lives with wife.  Pt is bedbound and wheelchair bound. Pt is reliant on wife for ADLs.   No h/o alcohol, tobacco, or recreational drug use  COVID vaccinated x3, last in february with Moderna. (01 Jul 2022 00:35)      REVIEW OF SYSTEMS:  *****        Vital Signs Last 24 Hrs  T(C): 37.1 (06 Jul 2022 04:45), Max: 37.2 (05 Jul 2022 19:59)  T(F): 98.7 (06 Jul 2022 04:45), Max: 98.9 (05 Jul 2022 19:59)  HR: 87 (06 Jul 2022 04:45) (75 - 87)  BP: 157/86 (06 Jul 2022 04:45) (140/93 - 157/86)  BP(mean): --  RR: 18 (06 Jul 2022 04:45) (18 - 18)  SpO2: 96% (06 Jul 2022 04:45) (96% - 97%)    CAPILLARY BLOOD GLUCOSE          I&O's Summary    05 Jul 2022 07:01  -  06 Jul 2022 07:00  --------------------------------------------------------  IN: 370 mL / OUT: 1350 mL / NET: -980 mL      PHYSICAL EXAM:  *******        DIET: Diet, NPO after Midnight:      NPO Start Date: 05-Jul-2022,   NPO Start Time: 23:59 (07-05-22 @ 15:28)  Diet, Regular:   Single Sips Only (07-05-22 @ 11:45)      LABS:      Ca    8.9        05 Jul 2022 06:35          Culture Results:   <10,000 CFU/mL Normal Urogenital Angle (07-01 @ 05:41)  Culture Results:   No Growth Final (06-30 @ 22:20)  Culture Results:   No Growth Final (06-30 @ 22:15)            Culture - Urine (collected 01 Jul 2022 05:41)  Source: Clean Catch Clean Catch (Midstream)  Final Report (02 Jul 2022 07:10):    <10,000 CFU/mL Normal Urogenital Angle    Culture - Blood (collected 30 Jun 2022 22:20)  Source: .Blood Blood-Peripheral  Final Report (06 Jul 2022 06:00):    No Growth Final    Culture - Blood (collected 30 Jun 2022 22:15)  Source: .Blood Blood-Peripheral  Final Report (06 Jul 2022 06:00):    No Growth Final       Anemia Panel:      Thyroid Panel:                RADIOLOGY & ADDITIONAL TESTS: _______      HEALTH ISSUES - PROBLEM Dx:  Sepsis due to pneumonia    Imaging abnormality    Atrial fibrillation with RVR    Hypertension    CAD (coronary artery disease)    H/O traumatic brain injury    LATOYA (acute kidney injury)    Chronic major depressive disorder    History of BPH    Glaucoma    Need for prophylactic measure          Consultant(s) Notes Reviewed:  [ x ] YES     Care Discussed with [ x ] Consultants, [ x ] Patient, [ x ] Family, [  ] HCP, [ x ] , [  ] Social Service, [ x ] RN, [  ] Physical Therapy, [  ] Palliative Care Team  DVT PPX: [  ] Lovenox, [  ] SC Heparin, [  ] Coumadin, [  ] Xarelto, [  ] Eliquis, [  ] Pradaxa, [  ] IV Heparin drip, [  ] SCD, [  ] Ambulation, [  ] Contraindicated 2/2 GI Bleed, [  ] Contraindicated 2/2  Bleed, [  ] Contraindicated 2/2 Brain Bleed  Advanced Directive: [  ] None, [  ] DNR/DNI Patient is a 72y old  Male who presents with a chief complaint of Sepsis     (04 Jul 2022 11:30)    HPI:  Pt is a 72 y.o male with a PMH of HTN, AFIB, BPH, bedbound 2/2 TBI, HLD, CAD, peripheral neuropathy, glaucoma, who presented to the ED with increasing cough and sweating. History taken via phone from wife, pt was not proving information at the moment of the exam. Pt was recently admitted from 6/17 to 6/23 for Covid infection and Afib with RVR. Wife reports that today pt started to have sweating, not feeling good, increased cough, 1 episode of diarrhea and vomiting, and she decided to brought him in. She states that he denied SOB, and his mental status was at baseline.  She also reports that after the discharge pt was doing in general good with persistent mild cough. He had nurse evaluation at home 2 days ago and was doing good and yesterday was seen by neuro with no acute concerns.     ED course:   - Vitals: 144/93, , RR 16, temp 98.2 < 101.8, O2 sat 95% with NC 4lt   - Labs: WBC 27.28, N 93%. Hb 15.2. INR 1.37. Na 146.  BUN 21/Cr 1.3.  Glucose 162.  Lactate 2.6  - CT CAP: Mild-to-moderate patchy airspace disease or consolidation both lower lobes. Mild airspace disease and/or atelectasis at the lingula.  (It is a preliminary result)  - EKG: Afib , right axis deviation, RV hypertrophy septal and inferior infarct age undeterm.   - s/p Zosyn and vanco x1 dose, NS 2lt bolus, Tylenol suppository, Zofran IV x1.  (01 Jul 2022 00:35)    INTERVAL HPI:  ________  7/1/22: pt seen, Examined, feels much better, on IV cefepime & Vanco, WBC elevated, seen by ID, No complaints  7/2/22: Pt seen, examined. feels the same as yesterday. Endorses a productive cough, but only complaint is that the food is not good. No complaints at this time. Continuing with IV cefepime & vanco., WBC -Nl , Mild Anemia ,  7/3/22: Pt seen and examined at bedside. He states he feels well, has a good appetite but would be even better if the food was good. No complaints at this time. Continue IV cefepime & vanco. WBC wnl.  7/4/22:Pt seen, examined, at bedside, WBC stable ,On IV Abx ,Off Isolation. MBS plan in AM , D/C Plan   7/5/22- Pt seen and examined at bedside. CT scan A/P -done, Plan for MRI MRCP to evaluate Pancreas.   7/6/22- Pt seen and examined at bedside. Still with cough, but otherwise feeling well. Tele: Afib 91bpm, no overnight events. Last day of IV abx before switch to PO for 1 day. MRCP today prior to discharge.    OVERNIGHT EVENTS: Nurse reports pt refused gabapentin, lipitor, urecholine, senna, flomax, tizanidine PM 7/5/22.    Home Medications:  aspirin 81 mg oral tablet: 1 tab(s) orally once a day (01 Jul 2022 02:13)  bethanechol 10 mg oral tablet: 1 tab(s) orally 3 times a day at (8am, 3pm, 10pm) (01 Jul 2022 02:13)  buPROPion 75 mg oral tablet: 1 tab(s) orally 2 times a day (10am, 10pm) (01 Jul 2022 02:13)  Eliquis 2.5 mg oral tablet: 1 tab(s) orally 2 times a day (10am, 10pm)t (01 Jul 2022 02:13)  gabapentin 300 mg oral capsule: 1 cap(s) orally 3 times a day (8am, 3pm, 10pm (01 Jul 2022 02:13)  latanoprost 0.005% ophthalmic solution: 1 drop(s) to each affected eye once a day (in the evening) (01 Jul 2022 02:13)  methylphenidate 10 mg oral tablet: 2 tab(s) orally once a day (in the morning) (01 Jul 2022 02:13)  rosuvastatin 10 mg oral tablet: 1 tab(s) orally once a day (01 Jul 2022 02:13)  tamsulosin 0.4 mg oral capsule: 1 cap(s) orally once a day (in the evening) (01 Jul 2022 02:13)  tiZANidine 4 mg oral tablet: 1 tab(s) orally 3 times a day (01 Jul 2022 02:13)  Vitamin D3 25 mcg (1000 intl units) oral tablet: 1 tab(s) orally once a day (01 Jul 2022 02:13)      MEDICATIONS  (STANDING):  ALBUTerol    90 MICROgram(s) HFA Inhaler 1 Puff(s) Inhalation three times a day  ammonium lactate 12% Lotion 1 Application(s) Topical two times a day  apixaban 2.5 milliGRAM(s) Oral every 12 hours  aspirin  chewable 81 milliGRAM(s) Oral daily  atorvastatin 40 milliGRAM(s) Oral at bedtime  bethanechol 10 milliGRAM(s) Oral three times a day  buPROPion XL (24-Hour) . 150 milliGRAM(s) Oral daily  cefepime   IVPB 2000 milliGRAM(s) IV Intermittent every 12 hours  dextrose 5%. 1000 milliLiter(s) (50 mL/Hr) IV Continuous <Continuous>  diltiazem    milliGRAM(s) Oral daily  gabapentin 300 milliGRAM(s) Oral three times a day  latanoprost 0.005% Ophthalmic Solution 1 Drop(s) Both EYES at bedtime  losartan 25 milliGRAM(s) Oral daily  methylphenidate 20 milliGRAM(s) Oral <User Schedule>  mupirocin 2% Nasal 1 Application(s) Both Nostrils two times a day  senna 2 Tablet(s) Oral at bedtime  tamsulosin 0.4 milliGRAM(s) Oral at bedtime  tiZANidine 4 milliGRAM(s) Oral three times a day  vancomycin  IVPB 1000 milliGRAM(s) IV Intermittent every 24 hours    MEDICATIONS  (PRN):  acetaminophen     Tablet .. 650 milliGRAM(s) Oral every 6 hours PRN Temp greater or equal to 38C (100.4F), Mild Pain (1 - 3)  benzonatate 100 milliGRAM(s) Oral three times a day PRN Cough  sodium chloride 0.65% Nasal 1 Spray(s) Both Nostrils four times a day PRN Nasal Congestion      No Known Allergies      Social History:  Lives with wife.  Pt is bedbound and wheelchair bound. Pt is reliant on wife for ADLs.   No h/o alcohol, tobacco, or recreational drug use  COVID vaccinated x3, last in february with Moderna. (01 Jul 2022 00:35)      REVIEW OF SYSTEMS:   CONSTITUTIONAL: No fever, No chills, No fatigue, No myalgia, No Body ache, No Weakness  EYES: No eye pain,  No visual disturbances, No discharge, NO Redness  ENMT:  No ear pain, No nose bleed, No vertigo; No sinus pain, NO throat pain, No Congestion  NECK: No pain, No stiffness  RESPIRATORY: + productive cough, NO wheezing, No  hemoptysis, NO shortness of breath  CARDIOVASCULAR: No chest pain, palpitations  GASTROINTESTINAL: No abdominal pain, NO epigastric pain. No nausea, No vomiting; No diarrhea, No constipation. [  ] BM  GENITOURINARY: No dysuria, No frequency, No urgency, No hematuria, NO incontinence  NEUROLOGICAL: No headaches, No dizziness, + numbness in both feet, No tingling, No tremors, No weakness  EXT: + neuropathic Pain b/l feet, No Swelling, No Edema  SKIN:  [x ] No itching, burning, rashes, or lesions   MUSCULOSKELETAL: No joint pain ,No Jt swelling; No muscle pain, No back pain, No extremity pain  PSYCHIATRIC: No depression,  No anxiety,  No mood swings ,No difficulty sleeping at night  PAIN SCALE: [ x ] None  [  ] Other-  ROS Unable to obtain due to - [  ] Dementia  [  ] Lethargy [  ] Drowsy [  ] Sedated [  ] non verbal  REST OF REVIEW Of SYSTEM - [x] Normal         Vital Signs Last 24 Hrs  T(C): 37.1 (06 Jul 2022 04:45), Max: 37.2 (05 Jul 2022 19:59)  T(F): 98.7 (06 Jul 2022 04:45), Max: 98.9 (05 Jul 2022 19:59)  HR: 87 (06 Jul 2022 04:45) (75 - 87)  BP: 157/86 (06 Jul 2022 04:45) (140/93 - 157/86)  BP(mean): --  RR: 18 (06 Jul 2022 04:45) (18 - 18)  SpO2: 96% (06 Jul 2022 04:45) (96% - 97%)    CAPILLARY BLOOD GLUCOSE          I&O's Summary    05 Jul 2022 07:01  -  06 Jul 2022 07:00  --------------------------------------------------------  IN: 370 mL / OUT: 1350 mL / NET: -980 mL      PHYSICAL EXAM:  GENERAL:  [ X ] NAD, [X  ] Well appearing, [  ] Agitated, [  ] Drowsy, [  ] Lethargy, [  ] Confused   HEAD:  [X  ] Normal, [  ] Other  EYES:  [X  ] EOMI, [X  ] PERRLA, [X  ] Conjunctiva and sclera clear normal, [  ] Other, [  ] Pallor, [  ] Discharge  ENMT:  [ X ] Normal, [x ] Moist mucous membranes, [ x ] Good dentition, [ x ] No thrush  NECK:  [X  ] Supple, [ x ] No JVD, [ x ] Normal thyroid, [  ] Lymphadenopathy, [  ] Other  CHEST/LUNG:  [x  ] Clear to auscultation bilaterally, [X  ] Breath Sounds equal B/L / decreased, [ X ] Poor effort, [X  ] No rales, [X ] No rhonchi, [ X ] No wheezing  HEART:  [ X ] Regular rate and rhythm, [  ] Tachycardia, [  ] Bradycardia, [  ] Irregular, [ X ] No murmurs, No rubs, No gallops, [  ] PPM in place (Mfr:  )  ABDOMEN:  [ X ] Soft, [X  ] Nontender, [X] Nondistended, [X  ] No mass, [ X ] Bowel sounds present, [  ] Obese  NERVOUS SYSTEM:  [X ] Alert & Oriented x3, [  ] Nonfocal, [  ] Confusion, [  ] Encephalopathic, [  ] Sedated, [  ] Unable to assess, [  ] Dementia, [x  ] Other-B/L lower Ext Contractures, B/L Hand Contractures  EXTREMITIES:  [ X ] 2+ Peripheral Pulses, No clubbing, No cyanosis,  No edema, [  ] PVD stasis skin changes B/L lower EXT, [  ] Wound  LYMPH:  No lymphadenopathy noted  SKIN:  [ x ] No rashes or lesions, [  ] Pressure ulcers, [  ] Ecchymosis, [  ] Skin tears, [x  ] Other - dry scabs on legs        DIET: Diet, NPO after Midnight:      NPO Start Date: 05-Jul-2022,   NPO Start Time: 23:59 (07-05-22 @ 15:28)  Diet, Regular:   Single Sips Only (07-05-22 @ 11:45)      LABS:      Ca    8.9        05 Jul 2022 06:35        Culture Results:   <10,000 CFU/mL Normal Urogenital Angle (07-01 @ 05:41)  Culture Results:   No Growth Final (06-30 @ 22:20)  Culture Results:   No Growth Final (06-30 @ 22:15)        Culture - Urine (collected 01 Jul 2022 05:41)  Source: Clean Catch Clean Catch (Midstream)  Final Report (02 Jul 2022 07:10):    <10,000 CFU/mL Normal Urogenital Angle    Culture - Blood (collected 30 Jun 2022 22:20)  Source: .Blood Blood-Peripheral  Final Report (06 Jul 2022 06:00):    No Growth Final    Culture - Blood (collected 30 Jun 2022 22:15)  Source: .Blood Blood-Peripheral  Final Report (06 Jul 2022 06:00):    No Growth Final       Anemia Panel:      Thyroid Panel:        RADIOLOGY & ADDITIONAL TESTS: N/A      HEALTH ISSUES - PROBLEM Dx:  Sepsis due to pneumonia    Imaging abnormality    Atrial fibrillation with RVR    Hypertension    CAD (coronary artery disease)    H/O traumatic brain injury    LATOYA (acute kidney injury)    Chronic major depressive disorder    History of BPH    Glaucoma    Need for prophylactic measure          Consultant(s) Notes Reviewed:  [ x ] YES     Care Discussed with [ x ] Consultants, [ x ] Patient, [ x ] Family, [  ] HCP, [ x ] , [  ] Social Service, [ x ] RN, [  ] Physical Therapy, [  ] Palliative Care Team  DVT PPX: [  ] Lovenox, [  ] SC Heparin, [  ] Coumadin, [  ] Xarelto, [ X ] Eliquis, [  ] Pradaxa, [  ] IV Heparin drip, [  ] SCD, [  ] Ambulation, [  ] Contraindicated 2/2 GI Bleed, [  ] Contraindicated 2/2  Bleed, [  ] Contraindicated 2/2 Brain Bleed  Advanced Directive: [ X ] None, [  ] DNR/DNI Patient is a 72y old  Male who presents with a chief complaint of Sepsis     (04 Jul 2022 11:30)    HPI:  Pt is a 72 y.o male with a PMH of HTN, AFIB, BPH, bedbound 2/2 TBI, HLD, CAD, peripheral neuropathy, glaucoma, who presented to the ED with increasing cough and sweating. History taken via phone from wife, pt was not proving information at the moment of the exam. Pt was recently admitted from 6/17 to 6/23 for Covid infection and Afib with RVR. Wife reports that today pt started to have sweating, not feeling good, increased cough, 1 episode of diarrhea and vomiting, and she decided to brought him in. She states that he denied SOB, and his mental status was at baseline.  She also reports that after the discharge pt was doing in general good with persistent mild cough. He had nurse evaluation at home 2 days ago and was doing good and yesterday was seen by neuro with no acute concerns.     ED course:   - Vitals: 144/93, , RR 16, temp 98.2 < 101.8, O2 sat 95% with NC 4lt   - Labs: WBC 27.28, N 93%. Hb 15.2. INR 1.37. Na 146.  BUN 21/Cr 1.3.  Glucose 162.  Lactate 2.6  - CT CAP: Mild-to-moderate patchy airspace disease or consolidation both lower lobes. Mild airspace disease and/or atelectasis at the lingula.  (It is a preliminary result)  - EKG: Afib , right axis deviation, RV hypertrophy septal and inferior infarct age undeterm.   - s/p Zosyn and vanco x1 dose, NS 2lt bolus, Tylenol suppository, Zofran IV x1.  (01 Jul 2022 00:35)    INTERVAL HPI:  ________  7/1/22: pt seen, Examined, feels much better, on IV cefepime & Vanco, WBC elevated, seen by ID, No complaints  7/2/22: Pt seen, examined. feels the same as yesterday. Endorses a productive cough, but only complaint is that the food is not good. No complaints at this time. Continuing with IV cefepime & vanco., WBC -Nl , Mild Anemia ,  7/3/22: Pt seen and examined at bedside. He states he feels well, has a good appetite but would be even better if the food was good. No complaints at this time. Continue IV cefepime & vanco. WBC wnl.  7/4/22:Pt seen, examined, at bedside, WBC stable ,On IV Abx ,Off Isolation. MBS plan in AM , D/C Plan   7/5/22- Pt seen and examined at bedside. CT scan A/P -done, Plan for MRI MRCP to evaluate Pancreas.   7/6/22- Pt seen and examined at bedside. Still with cough, but otherwise feeling well. Tele: Afib 91bpm, no overnight events. Last day of IV abx before switch to PO for 1 day. MRCP today prior to discharge.    OVERNIGHT EVENTS: Nurse reports pt refused gabapentin, lipitor, urecholine, senna, flomax, tizanidine PM 7/5/22.    Home Medications:  aspirin 81 mg oral tablet: 1 tab(s) orally once a day (01 Jul 2022 02:13)  bethanechol 10 mg oral tablet: 1 tab(s) orally 3 times a day at (8am, 3pm, 10pm) (01 Jul 2022 02:13)  buPROPion 75 mg oral tablet: 1 tab(s) orally 2 times a day (10am, 10pm) (01 Jul 2022 02:13)  Eliquis 2.5 mg oral tablet: 1 tab(s) orally 2 times a day (10am, 10pm)t (01 Jul 2022 02:13)  gabapentin 300 mg oral capsule: 1 cap(s) orally 3 times a day (8am, 3pm, 10pm (01 Jul 2022 02:13)  latanoprost 0.005% ophthalmic solution: 1 drop(s) to each affected eye once a day (in the evening) (01 Jul 2022 02:13)  methylphenidate 10 mg oral tablet: 2 tab(s) orally once a day (in the morning) (01 Jul 2022 02:13)  rosuvastatin 10 mg oral tablet: 1 tab(s) orally once a day (01 Jul 2022 02:13)  tamsulosin 0.4 mg oral capsule: 1 cap(s) orally once a day (in the evening) (01 Jul 2022 02:13)  tiZANidine 4 mg oral tablet: 1 tab(s) orally 3 times a day (01 Jul 2022 02:13)  Vitamin D3 25 mcg (1000 intl units) oral tablet: 1 tab(s) orally once a day (01 Jul 2022 02:13)      MEDICATIONS  (STANDING):  ALBUTerol    90 MICROgram(s) HFA Inhaler 1 Puff(s) Inhalation three times a day  ammonium lactate 12% Lotion 1 Application(s) Topical two times a day  apixaban 2.5 milliGRAM(s) Oral every 12 hours  aspirin  chewable 81 milliGRAM(s) Oral daily  atorvastatin 40 milliGRAM(s) Oral at bedtime  bethanechol 10 milliGRAM(s) Oral three times a day  buPROPion XL (24-Hour) . 150 milliGRAM(s) Oral daily  cefepime   IVPB 2000 milliGRAM(s) IV Intermittent every 12 hours  dextrose 5%. 1000 milliLiter(s) (50 mL/Hr) IV Continuous <Continuous>  diltiazem    milliGRAM(s) Oral daily  gabapentin 300 milliGRAM(s) Oral three times a day  latanoprost 0.005% Ophthalmic Solution 1 Drop(s) Both EYES at bedtime  losartan 25 milliGRAM(s) Oral daily  methylphenidate 20 milliGRAM(s) Oral <User Schedule>  mupirocin 2% Nasal 1 Application(s) Both Nostrils two times a day  senna 2 Tablet(s) Oral at bedtime  tamsulosin 0.4 milliGRAM(s) Oral at bedtime  tiZANidine 4 milliGRAM(s) Oral three times a day  vancomycin  IVPB 1000 milliGRAM(s) IV Intermittent every 24 hours    MEDICATIONS  (PRN):  acetaminophen     Tablet .. 650 milliGRAM(s) Oral every 6 hours PRN Temp greater or equal to 38C (100.4F), Mild Pain (1 - 3)  benzonatate 100 milliGRAM(s) Oral three times a day PRN Cough  sodium chloride 0.65% Nasal 1 Spray(s) Both Nostrils four times a day PRN Nasal Congestion      No Known Allergies      Social History:  Lives with wife.  Pt is bedbound and wheelchair bound. Pt is reliant on wife for ADLs.   No h/o alcohol, tobacco, or recreational drug use  COVID vaccinated x3, last in february with Moderna. (01 Jul 2022 00:35)      REVIEW OF SYSTEMS:   CONSTITUTIONAL: No fever, No chills, No fatigue, No myalgia, No Body ache, No Weakness  EYES: No eye pain,  No visual disturbances, No discharge, NO Redness  ENMT:  No ear pain, No nose bleed, No vertigo; No sinus pain, NO throat pain, No Congestion  NECK: No pain, No stiffness  RESPIRATORY: + productive cough, NO wheezing, No  hemoptysis, NO shortness of breath  CARDIOVASCULAR: No chest pain, palpitations  GASTROINTESTINAL: No abdominal pain, NO epigastric pain. No nausea, No vomiting; No diarrhea, No constipation. [  ] BM  GENITOURINARY: No dysuria, No frequency, No urgency, No hematuria, NO incontinence  NEUROLOGICAL: No headaches, No dizziness, + numbness in both feet, No tingling, No tremors, No weakness  EXT: + neuropathic Pain b/l feet, No Swelling, No Edema  SKIN:  [x ] No itching, burning, rashes, or lesions   MUSCULOSKELETAL: No joint pain ,No Jt swelling; No muscle pain, No back pain, No extremity pain  PSYCHIATRIC: No depression,  No anxiety,  No mood swings ,No difficulty sleeping at night  PAIN SCALE: [ x ] None  [  ] Other-  ROS Unable to obtain due to - [  ] Dementia  [  ] Lethargy [  ] Drowsy [  ] Sedated [  ] non verbal  REST OF REVIEW Of SYSTEM - [x] Normal         Vital Signs Last 24 Hrs  T(C): 37.1 (06 Jul 2022 04:45), Max: 37.2 (05 Jul 2022 19:59)  T(F): 98.7 (06 Jul 2022 04:45), Max: 98.9 (05 Jul 2022 19:59)  HR: 87 (06 Jul 2022 04:45) (75 - 87)  BP: 157/86 (06 Jul 2022 04:45) (140/93 - 157/86)  BP(mean): --  RR: 18 (06 Jul 2022 04:45) (18 - 18)  SpO2: 96% (06 Jul 2022 04:45) (96% - 97%)    CAPILLARY BLOOD GLUCOSE          I&O's Summary    05 Jul 2022 07:01  -  06 Jul 2022 07:00  --------------------------------------------------------  IN: 370 mL / OUT: 1350 mL / NET: -980 mL      PHYSICAL EXAM:  GENERAL:  [ X ] NAD, [X  ] Well appearing, [  ] Agitated, [  ] Drowsy, [  ] Lethargy, [  ] Confused   HEAD:  [X  ] Normal, [  ] Other  EYES:  [X  ] EOMI, [X  ] PERRLA, [X  ] Conjunctiva and sclera clear normal, [  ] Other, [  ] Pallor, [  ] Discharge  ENMT:  [ X ] Normal, [x ] Moist mucous membranes, [ x ] Good dentition, [ x ] No thrush  NECK:  [X  ] Supple, [ x ] No JVD, [ x ] Normal thyroid, [  ] Lymphadenopathy, [  ] Other  CHEST/LUNG:  [x  ] Clear to auscultation bilaterally, [X  ] Breath Sounds equal B/L / decreased, [ X ] Poor effort, [X  ] No rales, [X ] No rhonchi, [ X ] No wheezing  HEART:  [ X ] Regular rate and rhythm, [  ] Tachycardia, [  ] Bradycardia, [  ] Irregular, [ X ] No murmurs, No rubs, No gallops, [  ] PPM in place (Mfr:  )  ABDOMEN:  [ X ] Soft, [X  ] Nontender, [X] Nondistended, [X  ] No mass, [ X ] Bowel sounds present, [  ] Obese  NERVOUS SYSTEM:  [X ] Alert & Oriented x3, [  ] Nonfocal, [  ] Confusion, [  ] Encephalopathic, [  ] Sedated, [  ] Unable to assess, [  ] Dementia, [x  ] Other-B/L lower Ext Contractures, B/L Hand Contractures  EXTREMITIES:  [ X ] 2+ Peripheral Pulses, No clubbing, No cyanosis,  No edema, [  ] PVD stasis skin changes B/L lower EXT, [  ] Wound  LYMPH:  No lymphadenopathy noted  SKIN:  [ x ] No rashes or lesions, [  ] Pressure ulcers, [  ] Ecchymosis, [  ] Skin tears, [x  ] Other - dry scabs on legs        DIET: Diet, NPO after Midnight:      NPO Start Date: 05-Jul-2022,   NPO Start Time: 23:59 (07-05-22 @ 15:28)  Diet, Regular:   Single Sips Only (07-05-22 @ 11:45)      LABS:      Ca    8.9        05 Jul 2022 06:35        Culture Results:   <10,000 CFU/mL Normal Urogenital Angle (07-01 @ 05:41)  Culture Results:   No Growth Final (06-30 @ 22:20)  Culture Results:   No Growth Final (06-30 @ 22:15)        Culture - Urine (collected 01 Jul 2022 05:41)  Source: Clean Catch Clean Catch (Midstream)  Final Report (02 Jul 2022 07:10):    <10,000 CFU/mL Normal Urogenital Angle    Culture - Blood (collected 30 Jun 2022 22:20)  Source: .Blood Blood-Peripheral  Final Report (06 Jul 2022 06:00):    No Growth Final    Culture - Blood (collected 30 Jun 2022 22:15)  Source: .Blood Blood-Peripheral  Final Report (06 Jul 2022 06:00):    No Growth Final       Anemia Panel:      Thyroid Panel:        RADIOLOGY & ADDITIONAL TESTS: < from: MR MRCP w/wo IV Cont (07.06.22 @ 11:33) >  IMPRESSION:  2.0 x 1.2 cm structure that is slightly T2 hyperintense, and T1   hyperintense on precontrast fat-suppressed T1-weighted gradient echo   images. This structure enhances similarly from that of the normal   pancreatic parenchyma on subtraction images. Findings may represent   normal pancreatic parenchyma with less interspersed fat, or may represent   a nonspecific mass lesion. Correlation with tumor markers is recommended.   If clinically indicated, endoscopic ultrasound may be pursued for further   evaluation.    Small gallstones in the gallbladder. No evidence for thickened   gallbladder wall or pericholecystic fluid.    9 mm left adrenal lesion as described above, suggestive of a small   hemorrhage.    Trace perisplenic ascites.    Small right pleural effusion.        HEALTH ISSUES - PROBLEM Dx:  Sepsis due to pneumonia    Imaging abnormality    Atrial fibrillation with RVR    Hypertension    CAD (coronary artery disease)    H/O traumatic brain injury    LATOYA (acute kidney injury)    Chronic major depressive disorder    History of BPH    Glaucoma    Need for prophylactic measure          Consultant(s) Notes Reviewed:  [ x ] YES     Care Discussed with [ x ] Consultants, [ x ] Patient, [ x ] Family, [  ] HCP, [ x ] , [  ] Social Service, [ x ] RN, [  ] Physical Therapy, [  ] Palliative Care Team  DVT PPX: [  ] Lovenox, [  ] SC Heparin, [  ] Coumadin, [  ] Xarelto, [ X ] Eliquis, [  ] Pradaxa, [  ] IV Heparin drip, [  ] SCD, [  ] Ambulation, [  ] Contraindicated 2/2 GI Bleed, [  ] Contraindicated 2/2  Bleed, [  ] Contraindicated 2/2 Brain Bleed  Advanced Directive: [ X ] None, [  ] DNR/DNI Patient is a 72y old  Male who presents with a chief complaint of Sepsis     (04 Jul 2022 11:30)    HPI:  Pt is a 72 y.o male with a PMH of HTN, AFIB, BPH, bedbound 2/2 TBI, HLD, CAD, peripheral neuropathy, glaucoma, who presented to the ED with increasing cough and sweating. History taken via phone from wife, pt was not proving information at the moment of the exam. Pt was recently admitted from 6/17 to 6/23 for Covid infection and Afib with RVR. Wife reports that today pt started to have sweating, not feeling good, increased cough, 1 episode of diarrhea and vomiting, and she decided to brought him in. She states that he denied SOB, and his mental status was at baseline.  She also reports that after the discharge pt was doing in general good with persistent mild cough. He had nurse evaluation at home 2 days ago and was doing good and yesterday was seen by neuro with no acute concerns.     ED course:   - Vitals: 144/93, , RR 16, temp 98.2 < 101.8, O2 sat 95% with NC 4lt   - Labs: WBC 27.28, N 93%. Hb 15.2. INR 1.37. Na 146.  BUN 21/Cr 1.3.  Glucose 162.  Lactate 2.6  - CT CAP: Mild-to-moderate patchy airspace disease or consolidation both lower lobes. Mild airspace disease and/or atelectasis at the lingula.  (It is a preliminary result)  - EKG: Afib , right axis deviation, RV hypertrophy septal and inferior infarct age undeterm.   - s/p Zosyn and vanco x1 dose, NS 2lt bolus, Tylenol suppository, Zofran IV x1.  (01 Jul 2022 00:35)    INTERVAL HPI:  ________  7/1/22: pt seen, Examined, feels much better, on IV cefepime & Vanco, WBC elevated, seen by ID, No complaints  7/2/22: Pt seen, examined. feels the same as yesterday. Endorses a productive cough, but only complaint is that the food is not good. No complaints at this time. Continuing with IV cefepime & vanco., WBC -Nl , Mild Anemia ,  7/3/22: Pt seen and examined at bedside. He states he feels well, has a good appetite but would be even better if the food was good. No complaints at this time. Continue IV cefepime & vanco. WBC wnl.  7/4/22:Pt seen, examined, at bedside, WBC stable ,On IV Abx ,Off Isolation. MBS plan in AM , D/C Plan   7/5/22- Pt seen and examined at bedside. CT scan A/P -done, Plan for MRI MRCP to evaluate Pancreas.   7/6/22- Pt seen and examined at bedside. Still with cough, but otherwise feeling well. Tele: Afib 91bpm, no overnight events. Last day of IV abx before switch to PO for 1 day. MRCP today prior to discharge.    OVERNIGHT EVENTS: Nurse reports pt refused gabapentin, lipitor, urecholine, senna, flomax, tizanidine PM 7/5/22.    Home Medications:  aspirin 81 mg oral tablet: 1 tab(s) orally once a day (01 Jul 2022 02:13)  bethanechol 10 mg oral tablet: 1 tab(s) orally 3 times a day at (8am, 3pm, 10pm) (01 Jul 2022 02:13)  buPROPion 75 mg oral tablet: 1 tab(s) orally 2 times a day (10am, 10pm) (01 Jul 2022 02:13)  Eliquis 2.5 mg oral tablet: 1 tab(s) orally 2 times a day (10am, 10pm)t (01 Jul 2022 02:13)  gabapentin 300 mg oral capsule: 1 cap(s) orally 3 times a day (8am, 3pm, 10pm (01 Jul 2022 02:13)  latanoprost 0.005% ophthalmic solution: 1 drop(s) to each affected eye once a day (in the evening) (01 Jul 2022 02:13)  methylphenidate 10 mg oral tablet: 2 tab(s) orally once a day (in the morning) (01 Jul 2022 02:13)  rosuvastatin 10 mg oral tablet: 1 tab(s) orally once a day (01 Jul 2022 02:13)  tamsulosin 0.4 mg oral capsule: 1 cap(s) orally once a day (in the evening) (01 Jul 2022 02:13)  tiZANidine 4 mg oral tablet: 1 tab(s) orally 3 times a day (01 Jul 2022 02:13)  Vitamin D3 25 mcg (1000 intl units) oral tablet: 1 tab(s) orally once a day (01 Jul 2022 02:13)      MEDICATIONS  (STANDING):  ALBUTerol    90 MICROgram(s) HFA Inhaler 1 Puff(s) Inhalation three times a day  ammonium lactate 12% Lotion 1 Application(s) Topical two times a day  apixaban 2.5 milliGRAM(s) Oral every 12 hours  aspirin  chewable 81 milliGRAM(s) Oral daily  atorvastatin 40 milliGRAM(s) Oral at bedtime  bethanechol 10 milliGRAM(s) Oral three times a day  buPROPion XL (24-Hour) . 150 milliGRAM(s) Oral daily  cefepime   IVPB 2000 milliGRAM(s) IV Intermittent every 12 hours  dextrose 5%. 1000 milliLiter(s) (50 mL/Hr) IV Continuous <Continuous>  diltiazem    milliGRAM(s) Oral daily  gabapentin 300 milliGRAM(s) Oral three times a day  latanoprost 0.005% Ophthalmic Solution 1 Drop(s) Both EYES at bedtime  losartan 25 milliGRAM(s) Oral daily  methylphenidate 20 milliGRAM(s) Oral <User Schedule>  mupirocin 2% Nasal 1 Application(s) Both Nostrils two times a day  senna 2 Tablet(s) Oral at bedtime  tamsulosin 0.4 milliGRAM(s) Oral at bedtime  tiZANidine 4 milliGRAM(s) Oral three times a day  vancomycin  IVPB 1000 milliGRAM(s) IV Intermittent every 24 hours    MEDICATIONS  (PRN):  acetaminophen     Tablet .. 650 milliGRAM(s) Oral every 6 hours PRN Temp greater or equal to 38C (100.4F), Mild Pain (1 - 3)  benzonatate 100 milliGRAM(s) Oral three times a day PRN Cough  sodium chloride 0.65% Nasal 1 Spray(s) Both Nostrils four times a day PRN Nasal Congestion      No Known Allergies      Social History:  Lives with wife.  Pt is bedbound and wheelchair bound. Pt is reliant on wife for ADLs.   No h/o alcohol, tobacco, or recreational drug use  COVID vaccinated x3, last in february with Moderna. (01 Jul 2022 00:35)      REVIEW OF SYSTEMS:   CONSTITUTIONAL: No fever, No chills, No fatigue, No myalgia, No Body ache, No Weakness  EYES: No eye pain,  No visual disturbances, No discharge, NO Redness  ENMT:  No ear pain, No nose bleed, No vertigo; No sinus pain, NO throat pain, No Congestion  NECK: No pain, No stiffness  RESPIRATORY: + productive cough, NO wheezing, No  hemoptysis, NO shortness of breath  CARDIOVASCULAR: No chest pain, palpitations  GASTROINTESTINAL: No abdominal pain, NO epigastric pain. No nausea, No vomiting; No diarrhea, No constipation. [  ] BM  GENITOURINARY: No dysuria, No frequency, No urgency, No hematuria, NO incontinence  NEUROLOGICAL: No headaches, No dizziness, + numbness in both feet, No tingling, No tremors, No weakness  EXT: + neuropathic Pain b/l feet, No Swelling, No Edema  SKIN:  [x ] No itching, burning, rashes, or lesions   MUSCULOSKELETAL: No joint pain ,No Jt swelling; No muscle pain, No back pain, No extremity pain  PSYCHIATRIC: No depression,  No anxiety,  No mood swings ,No difficulty sleeping at night  PAIN SCALE: [ x ] None  [  ] Other-  ROS Unable to obtain due to - [  ] Dementia  [  ] Lethargy [  ] Drowsy [  ] Sedated [  ] non verbal  REST OF REVIEW Of SYSTEM - [x] Normal         Vital Signs Last 24 Hrs  T(C): 37.1 (06 Jul 2022 04:45), Max: 37.2 (05 Jul 2022 19:59)  T(F): 98.7 (06 Jul 2022 04:45), Max: 98.9 (05 Jul 2022 19:59)  HR: 87 (06 Jul 2022 04:45) (75 - 87)  BP: 157/86 (06 Jul 2022 04:45) (140/93 - 157/86)  BP(mean): --  RR: 18 (06 Jul 2022 04:45) (18 - 18)  SpO2: 96% (06 Jul 2022 04:45) (96% - 97%)    CAPILLARY BLOOD GLUCOSE          I&O's Summary    05 Jul 2022 07:01  -  06 Jul 2022 07:00  --------------------------------------------------------  IN: 370 mL / OUT: 1350 mL / NET: -980 mL      PHYSICAL EXAM:  GENERAL:  [ X ] NAD, [X  ] Well appearing, [  ] Agitated, [  ] Drowsy, [  ] Lethargy, [  ] Confused   HEAD:  [X  ] Normal, [  ] Other  EYES:  [X  ] EOMI, [X  ] PERRLA, [X  ] Conjunctiva and sclera clear normal, [  ] Other, [  ] Pallor, [  ] Discharge  ENMT:  [ X ] Normal, [x ] Moist mucous membranes, [ x ] Good dentition, [ x ] No thrush  NECK:  [X  ] Supple, [ x ] No JVD, [ x ] Normal thyroid, [  ] Lymphadenopathy, [  ] Other  CHEST/LUNG:  [x  ] Clear to auscultation bilaterally, [X  ] Breath Sounds equal B/L / decreased, [ Xx ] Poor effort, [X  ] No rales, [X ] No rhonchi, [ X ] No wheezing  HEART:  [ X ] Regular rate and rhythm, [  ] Tachycardia, [  ] Bradycardia, [  ] Irregular, [ X ] No murmurs, No rubs, No gallops, [  ] PPM in place (Mfr:  )  ABDOMEN:  [ X ] Soft, [X  ] Nontender, [X] Nondistended, [X  ] No mass, [ X ] Bowel sounds present, [  ] Obese  NERVOUS SYSTEM:  [X ] Alert & Oriented x3, [  ] Nonfocal, [  ] Confusion, [  ] Encephalopathic, [  ] Sedated, [  ] Unable to assess, [  ] Dementia, [x  ] Other-B/L lower Ext Contractures, B/L Hand Contractures, B/L Lower ext paresis   EXTREMITIES:  [ X ] 2+ Peripheral Pulses, No clubbing, No cyanosis,[ x] 2 + pitting  edema b/l lower ext, [x  ] PVD stasis skin changes B/L lower EXT, [  ] Wound + Scabs dry on Ext   LYMPH:  No lymphadenopathy noted  SKIN:  [ x ] No rashes or lesions, [  ] Pressure ulcers, [  ] Ecchymosis, [  ] Skin tears, [x  ] Other - dry scabs on legs    DIET: Diet, NPO after Midnight:      NPO Start Date: 05-Jul-2022,   NPO Start Time: 23:59 (07-05-22 @ 15:28)  Diet, Regular:   Single Sips Only (07-05-22 @ 11:45)      LABS:                        13.0   10.57 )-----------( 199      ( 06 Jul 2022 09:43 )             39.5     06 Jul 2022 09:43    141    |  108    |  10     ----------------------------<  142    3.7     |  26     |  0.78     Ca    9.1        06 Jul 2022 09:43        Ca    8.9        05 Jul 2022 06:35        Culture Results:   <10,000 CFU/mL Normal Urogenital Angle (07-01 @ 05:41)  Culture Results:   No Growth Final (06-30 @ 22:20)  Culture Results:   No Growth Final (06-30 @ 22:15)        Culture - Urine (collected 01 Jul 2022 05:41)  Source: Clean Catch Clean Catch (Midstream)  Final Report (02 Jul 2022 07:10):    <10,000 CFU/mL Normal Urogenital Angle    Culture - Blood (collected 30 Jun 2022 22:20)  Source: .Blood Blood-Peripheral  Final Report (06 Jul 2022 06:00):    No Growth Final    Culture - Blood (collected 30 Jun 2022 22:15)  Source: .Blood Blood-Peripheral  Final Report (06 Jul 2022 06:00):    No Growth Final      RADIOLOGY & ADDITIONAL TESTS: < from: MR MRCP w/wo IV Cont (07.06.22 @ 11:33) >  IMPRESSION:  2.0 x 1.2 cm structure that is slightly T2 hyperintense, and T1   hyperintense on precontrast fat-suppressed T1-weighted gradient echo   images. This structure enhances similarly from that of the normal   pancreatic parenchyma on subtraction images. Findings may represent   normal pancreatic parenchyma with less interspersed fat, or may represent   a nonspecific mass lesion. Correlation with tumor markers is recommended.   If clinically indicated, endoscopic ultrasound may be pursued for further   evaluation.    Small gallstones in the gallbladder. No evidence for thickened   gallbladder wall or pericholecystic fluid.    9 mm left adrenal lesion as described above, suggestive of a small   hemorrhage.    Trace perisplenic ascites.    Small right pleural effusion.        HEALTH ISSUES - PROBLEM Dx:  Sepsis due to pneumonia    Imaging abnormality    Atrial fibrillation with RVR    Hypertension    CAD (coronary artery disease)    H/O traumatic brain injury    LATOYA (acute kidney injury)    Chronic major depressive disorder    History of BPH    Glaucoma    Need for prophylactic measure      Consultant(s) Notes Reviewed:  [ x ] YES     Care Discussed with [ x ] Consultants, [ x ] Patient, [ x ] Family -wife  [  ] HCP, [ x ] , [  ] Social Service, [ x ] RN, [  ] Physical Therapy, [  ] Palliative Care Team  DVT PPX: [  ] Lovenox, [  ] SC Heparin, [  ] Coumadin, [  ] Xarelto, [ X ] Eliquis, [  ] Pradaxa, [  ] IV Heparin drip, [  ] SCD, [  ] Ambulation, [  ] Contraindicated 2/2 GI Bleed, [  ] Contraindicated 2/2  Bleed, [  ] Contraindicated 2/2 Brain Bleed  Advanced Directive: [ X ] None, [  ] DNR/DNI

## 2022-07-06 NOTE — PROGRESS NOTE ADULT - PROBLEM SELECTOR PLAN 2
< from: CT Abdomen and Pelvis No Cont (06.30.22 @ 23:35) >  IMPRESSION:  1.  Bilateral lower lobe bronchopneumonia.  2.  Uncinate pancreas acinar fullness compared to remainder of gland   (2/85). The pancreas lesion is not excludable. Consider contrast-enhanced   exam.    -will order CT A/P with IV contrast to r/o pancreatic lesion < from: CT Abdomen and Pelvis No Cont (06.30.22 @ 23:35) >  IMPRESSION:  1.  Bilateral lower lobe bronchopneumonia.  2.  Uncinate pancreas acinar fullness compared to remainder of gland   (2/85). The pancreas lesion is not excludable. Consider contrast-enhanced   exam.  - CT A/P with IV contrast to r/o pancreatic lesion 7/5/22- unable to assess in this imaging modality, MRI MRCP required  - MRCP w/ w/out IV contrast 7/6/22, f/u results < from: CT Abdomen and Pelvis No Cont (06.30.22 @ 23:35) >  IMPRESSION:  1.  Bilateral lower lobe bronchopneumonia.  2.  Uncinate pancreas acinar fullness compared to remainder of gland   (2/85). The pancreas lesion is not excludable. Consider contrast-enhanced   exam.  - CT A/P with IV contrast to r/o pancreatic lesion 7/5/22- unable to assess in this imaging modality, MRI MRCP required  - MRCP w/ w/out IV contrast 7/6/22, indeterminate result, should follow up outpatient. < from: CT Abdomen and Pelvis No Cont (06.30.22 @ 23:35) >  IMPRESSION:  1.  Bilateral lower lobe bronchopneumonia.  2.  Uncinate pancreas acinar fullness compared to remainder of gland   (2/85). The pancreas lesion is not excludable. Consider contrast-enhanced   exam.  - CT A/P with IV contrast to r/o pancreatic lesion 7/5/22- unable to assess in this imaging modality, MRI MRCP required  - MRCP w/ w/out IV contrast 7/6/22, indeterminate result, should follow up outpatient.  < from: MR MRCP w/wo IV Cont (07.06.22 @ 11:33) >  2.0 x 1.2 cm structure that is slightly T2 hyperintense, and T1   hyperintense on precontrast fat-suppressed T1-weighted gradient echo   images. This structure enhances similarly from that of the normal   pancreatic parenchyma on subtraction images. Findings may represent   normal pancreatic parenchyma with less interspersed fat, or may represent   a nonspecific mass lesion. Correlation with tumor markers is recommended.   If clinically indicated, endoscopic ultrasound may be pursued for further   evaluation.  Small gallstones in the gallbladder. No evidence for thickened -Follow up with GI /PMD as out pt for tumor markers & EUS Eval d/w wife at detail

## 2022-07-06 NOTE — PROGRESS NOTE ADULT - PROBLEM SELECTOR PLAN 6
- pt is bed and wheelchair bound, severe persisting peripheral neuropathy 2/2 prior traumatic brain bleed on Eliquis.   - Fall, aspiration, safety precautions  - MBS ordered 7/5/22***  - At home on gabapentin 300 mg TID, buproprion, tizanidine 4 mg TID, and ritalin qd. Continue home meds.   - Bedside PT  Pt Chronically bed and wheelchair bound which predisposes him for atelectasis and superinfection s/p Covid. - pt is bed and wheelchair bound, severe persisting peripheral neuropathy 2/2 prior traumatic brain bleed on Eliquis.   - Fall, aspiration, safety precautions  - MBS ordered 7/5/22- Normal diet with small sips  - At home on gabapentin 300 mg TID, buproprion, tizanidine 4 mg TID, and ritalin qd. Continue home meds.   - Bedside PT  Pt Chronically bed and wheelchair bound which predisposes him for atelectasis and superinfection s/p Covid.

## 2022-07-06 NOTE — PROGRESS NOTE ADULT - ASSESSMENT
Assessment:  Pt is a 72 y.o male with a PMH of HTN, AFIB, BPH, bedbound 2/2 TBI, HLD, CAD, peripheral neuropathy, glaucoma, recent COVID 19 pneumonia 6/14/22 admitted with Sepsis 2/2 post-viral PNA vs possible HAP  CT w/ b/l LL bronchoPNA  WBC downtrending  Clinically improving    Plan:   Complete x7 day course for PNA; can transition to PO ceftin 500mg BID to complete course  Trend temps/WBC  Maintain aspiration precautions  Pulm toileting  Increase activity  Off isolation precautions    D/c planning per primary team  D/w Dr. Bhardwaj    Infectious Diseases will continue to follow. Please call with any questions.   Hafsa Bhardwaj M.D.  [facility], Division of Infectious Diseases [phone]  
Assessment:  Pt is a 72 y.o male with a PMH of HTN, AFIB, BPH, bedbound 2/2 TBI, HLD, CAD, peripheral neuropathy, glaucoma, recent COVID 19 pneumonia 6/14/22 admitted with Sepsis 2/2 post-viral PNA vs possible HAP  CT w/ b/l LL bronchoPNA  WBC downtrending  Clinically improving    Plan:   Complete x7 day course for PNA; can transition to PO ceftin 500mg BID to complete course with last day of Abx tomorrow 7/7  D/c vancomycin  Trend temps/WBC  Maintain aspiration precautions  Pulm toileting  Increase activity  Off isolation precautions    D/c planning per primary team  D/w Dr. Bhardwaj    Infectious Diseases will continue to follow. Please call with any questions.   Hafsa Bhardwaj M.D.  [facility], Division of Infectious Diseases [phone]  
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   
Pt is a 72 y.o male with a PMH of HTN, AFIB, BPH, bedbound 2/2 TBI, HLD, CAD, peripheral neuropathy, glaucoma, who presented to the ED for sweats and increasing cough with sepsis/ PNA/ Leukocytosis.
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   

## 2022-07-06 NOTE — PROGRESS NOTE ADULT - NUTRITIONAL ASSESSMENT
1. Regular and thin liquids, via single/small cup sips only, as tolerated  2. Slow pacing, single/small bites/sips, and alternate solids with liquids  3. Maintain upright position during all meals  4. Maintain aspiration precautions  5. Maintain strict oral care  allow for swallow between intakes; alternate food with liquid; check mouth frequently for oral residue/pocketing; maintain upright posture during/after eating for 30 mins; no straws; oral hygiene; position upright (90 degrees); provide rest periods between swallows; small sips/bites  set up only required
1. Regular and thin liquids, via single/small cup sips only, as tolerated  2. Slow pacing, single/small bites/sips, and alternate solids with liquids  3. Maintain upright position during all meals  4. Maintain aspiration precautions  5. Maintain strict oral care  allow for swallow between intakes; alternate food with liquid; check mouth frequently for oral residue/pocketing; maintain upright posture during/after eating for 30 mins; no straws; oral hygiene; position upright (90 degrees); provide rest periods between swallows; small sips/bites  set up only required

## 2022-07-06 NOTE — PROGRESS NOTE ADULT - PROBLEM SELECTOR PLAN 7
Resolved  - BUN 21/Cr 1.3 on admission.  Baseline Cr 0.9  - LATOYA 2/2 current sepsis, dehydration   - Cr 1.0 today  - s/p 2 lt NS bolus.  - Restart  losartan for now   - Encourage fluid intake.   - Monitor and trend Cr. Resolved  - BUN 21/Cr 1.3 on admission.  Baseline Cr 0.9  - Cr 0.94 7/5/22  - LATOYA 2/2 current sepsis, dehydration   - s/p 2 lt NS bolus.  - Restart  losartan for now   - Encourage fluid intake.   - Monitor and trend Cr.

## 2022-07-06 NOTE — PROGRESS NOTE ADULT - PROBLEM SELECTOR PLAN 4
- Chronic and stable   - Medication adjusted during the last admission   - Continue home Cardizem CD  120 with hold parameters.   - Losartan 25 qd restart - Chronic and stable   - Medication adjusted during the last admission   - Continue home Cardizem  with hold parameters.   - continue Losartan 25 qd

## 2022-07-06 NOTE — PROGRESS NOTE ADULT - PROBLEM SELECTOR PROBLEM 1
Sepsis due to pneumonia

## 2022-07-06 NOTE — PROGRESS NOTE ADULT - PROBLEM SELECTOR PLAN 1
- Pt recently DC s/p covid infection now with superimposed PNA -Bacterial infection,   - Meet sepsis criteria with fever, tachycardia, leukocytosis.  WBC Resolved   - CT CAP: Mild-to-moderate patchy airspace disease or consolidation both lower lobes. Mild airspace disease and/or atelectasis at the lingula.  (It is a preliminary result)  - Continue  Vanco & Start Cefepime as d/w ID DR ANA Bhardwaj 5-7 days Abx - will f/u with ID to confirm duration/switch to oral abx.  - procal 4.05, lactate wnl 7/2/22  - MRSA PCR +, mupirocin nasal ordered  - blood cultures x 2- NGTD 7/5/22.  - Incentive spirometry , Inh PRN - Pt recently DC s/p covid infection now with superimposed PNA -Bacterial infection,   - Meet sepsis criteria with fever, tachycardia, leukocytosis.  WBC Resolved   - CT CAP: Mild-to-moderate patchy airspace disease or consolidation both lower lobes. Mild airspace disease and/or atelectasis at the lingula.  (It is a preliminary result)  - Continue Vanco & Start Cefepime as d/w ID DR ANA Bhardwaj 5-7 days Abx - today is day 6, per ID, will switch to PO Ceftin 500mg BID x 1 day to complete course.  - procal 4.05, lactate wnl 7/2/22  - MRSA PCR +, mupirocin nasal ordered  - blood cultures x 2- NGTD 7/6/22.  - Incentive spirometry , Inh PRN - Pt recently DC s/p covid infection now with superimposed PNA -Bacterial infection, -IMPROVED   - Meet sepsis criteria with fever, tachycardia, leukocytosis.  WBC Resolved   - CT CAP: Mild-to-moderate patchy airspace disease or consolidation both lower lobes. Mild airspace disease and/or atelectasis at the lingula.  (It is a preliminary result)  - Continue Vanco & Start Cefepime as d/w ID DR ANA Bhardwaj 5-7 days Abx - today is day 6, per ID, will switch to PO Ceftin 500mg BID x 1 day to complete course.  - procal 4.05, lactate wnl 7/2/22  - MRSA PCR +, mupirocin nasal ordered  - blood cultures x 2- NGTD 7/6/22.  - Incentive spirometry , Inh PRN

## 2022-07-06 NOTE — PROGRESS NOTE ADULT - PROBLEM SELECTOR PLAN 3
- Chronic. RVR in the setting of fever and dehydration. -RESOLVED  - TTE 6/15/22: Mild LV hypertrophy, normal systolic function, eLVEF of 60%.Grossly normal RV.  LA enlargement. Trace to mild MR / Trace TR.  - Continue home Cardizem CD  120 qd with hold parameters.   - Continue home Eliquis 2.5 mg 2x day   - Monitor in tele: ******* - Chronic. RVR in the setting of fever and dehydration. -RESOLVED  - TTE 6/15/22: Mild LV hypertrophy, normal systolic function, eLVEF of 60%.Grossly normal RV.  LA enlargement. Trace to mild MR / Trace TR.  - Continue home Cardizem CD  120 qd with hold parameters.   - Continue home Eliquis 2.5 mg 2x day   - Monitor in tele: Afib 91bpm (70's overnight) - no overnight events

## 2022-07-06 NOTE — PROGRESS NOTE ADULT - ATTENDING COMMENTS
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   Pt seen, examined, case & care plan d/w pt, at detail.  ID  Dr ANA Bhardwaj p- IV Abx  Cefepime & IV Vanco   -, Nasal MRSA PCR + Start Bactroban to nares 2x day x 5 days   PT Eval-OOB to chair   PO diet -S & S Eval-Regular diet, MBS   AM Labs  D/W wife at very Detail about Plan MBS in AM & D/C Plan home if ID has completed Abx & HHA is arranged.   Total care time is 45 minutes.
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   Pt seen, examined, case & care plan d/w pt, at detail.  ID  Dr ANA Bhardwaj p- IV Abx  Cefepime & IV Vanco   -, Nasal MRSA PCR + Start Bactroban to nares 2x day x 5 days   PT Eval  PO diet -S & S Eval-Regular diet, MBS   AM Labs  Total care time is 40 minutes.
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   Pt seen, examined, case & care plan d/w pt, at detail.  ID  Dr ANA Bhardwaj p- IV Abx  Cefepime & IV Vanco   -, Nasal MRSA PCR + Start Bactroban to nares 2x day x 5 days   PT Eval  PO diet -S & S Eval-Regular diet, MBS   AM Labs  Total care time is 40 minutes.
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   Pt seen, examined, case & care plan d/w pt, at detail.  ID  Dr ANA Bhardwaj p- IV Abx  Cefepime & IV Vanco completed, change to PO ceftin 2x day  -, Nasal MRSA PCR + Start Bactroban to nares 2x day x 5 days   PT Eval-OOB to chair   PO diet -S & S Eval-Regular diet, MBS -No aspiration   D/W wife at very Detail about out pt follow up after d/w her about MRCP results  D/C Home today  Total  d/c care time is 60 minutes.
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   Pt seen, examined, case & care plan d/w pt, at detail.  ID  Dr ANA Bhardwaj p- IV Abx  Cefepime & IV Vanco   -, Nasal MRSA PCR + Start Bactroban to nares 2x day x 5 days   PT Eval-OOB to chair   PO diet -S & S Eval-Regular diet, MBS   AM Labs  D/W wife at very Detail about Plan  for MRCP in AM .  Total care time is 45 minutes.

## 2022-07-06 NOTE — PROGRESS NOTE ADULT - SUBJECTIVE AND OBJECTIVE BOX
[facility], Division of Infectious Diseases  LADONNA Ramirez Y. Patel, S. Shah, [facility]  [phone]    Name: MELBA PARMAR  Age: 72y  Gender: Male  MRN: [id number]    Interval History:  No acute overnight events.   Notes reviewed    Antibiotics:  cefepime   IVPB 2000 milliGRAM(s) IV Intermittent every 12 hours  vancomycin  IVPB 1000 milliGRAM(s) IV Intermittent every 24 hours      Medications:  acetaminophen     Tablet .. 650 milliGRAM(s) Oral every 6 hours PRN  ALBUTerol    90 MICROgram(s) HFA Inhaler 1 Puff(s) Inhalation three times a day  ammonium lactate 12% Lotion 1 Application(s) Topical two times a day  apixaban 2.5 milliGRAM(s) Oral every 12 hours  aspirin  chewable 81 milliGRAM(s) Oral daily  atorvastatin 40 milliGRAM(s) Oral at bedtime  benzonatate 100 milliGRAM(s) Oral three times a day PRN  bethanechol 10 milliGRAM(s) Oral three times a day  buPROPion XL (24-Hour) . 150 milliGRAM(s) Oral daily  cefepime   IVPB 2000 milliGRAM(s) IV Intermittent every 12 hours  dextrose 5%. 1000 milliLiter(s) IV Continuous <Continuous>  diltiazem    milliGRAM(s) Oral daily  gabapentin 300 milliGRAM(s) Oral three times a day  latanoprost 0.005% Ophthalmic Solution 1 Drop(s) Both EYES at bedtime  losartan 25 milliGRAM(s) Oral daily  methylphenidate 20 milliGRAM(s) Oral <User Schedule>  mupirocin 2% Nasal 1 Application(s) Both Nostrils two times a day  senna 2 Tablet(s) Oral at bedtime  sodium chloride 0.65% Nasal 1 Spray(s) Both Nostrils four times a day PRN  tamsulosin 0.4 milliGRAM(s) Oral at bedtime  tiZANidine 4 milliGRAM(s) Oral three times a day  vancomycin  IVPB 1000 milliGRAM(s) IV Intermittent every 24 hours      Review of Systems:  A 10-point review of systems was obtained.     Review of systems otherwise negative except as previously noted.    Allergies: No Known Allergies    For details regarding the patient's past medical history, social history, family history, and other miscellaneous elements, please refer the initial infectious diseases consultation and/or the admitting history and physical examination for this admission.    Objective:  Vital Signs Last 24 Hrs  T(C): 36.4 (06 Jul 2022 12:32), Max: 37.2 (05 Jul 2022 19:59)  T(F): 97.6 (06 Jul 2022 12:32), Max: 98.9 (05 Jul 2022 19:59)  HR: 70 (06 Jul 2022 12:32) (70 - 87)  BP: 119/79 (06 Jul 2022 12:32) (119/79 - 157/86)  BP(mean): --  RR: 18 (06 Jul 2022 12:32) (18 - 18)  SpO2: 97% (06 Jul 2022 12:32) (96% - 97%)    Physical Examination:  General: no acute distress  HEENT: NC/AT, EOMI  Cardio: S1, S2 heard, RRR, no murmurs  Resp: decreased b/l breath sounds  Abd: soft, NT, ND  Ext: no edema or cyanosis  Skin: warm, dry, no visible rash      Laboratory Studies:                        13.0   10.57 )-----------( 199      ( 06 Jul 2022 09:43 )             39.5   07-06    141  |  108  |  10  ----------------------------<  142<H>  3.7   |  26  |  0.78    Ca    9.1      06 Jul 2022 09:43            Microbiology: reviewed    Culture - Urine (collected 07-01-22 @ 05:41)  Source: Clean Catch Clean Catch (Midstream)  Final Report (07-02-22 @ 07:10):    <10,000 CFU/mL Normal Urogenital Angle    Culture - Blood (collected 06-30-22 @ 22:20)  Source: .Blood Blood-Peripheral  Preliminary Report (07-02-22 @ 06:00):    No growth to date.    Culture - Blood (collected 06-30-22 @ 22:15)  Source: .Blood Blood-Peripheral  Preliminary Report (07-02-22 @ 06:00):    No growth to date.        Radiology: reviewed

## 2022-07-07 RX ORDER — CEFUROXIME AXETIL 250 MG
1 TABLET ORAL
Qty: 2 | Refills: 0
Start: 2022-07-07 | End: 2022-07-07

## 2022-07-08 ENCOUNTER — NON-APPOINTMENT (OUTPATIENT)
Age: 72
End: 2022-07-08

## 2022-07-15 ENCOUNTER — RX RENEWAL (OUTPATIENT)
Age: 72
End: 2022-07-15

## 2022-07-16 PROBLEM — U07.1 COVID-19: Chronic | Status: ACTIVE | Noted: 2022-07-02

## 2022-07-21 ENCOUNTER — APPOINTMENT (OUTPATIENT)
Dept: CARDIOLOGY | Facility: CLINIC | Age: 72
End: 2022-07-21

## 2022-07-21 ENCOUNTER — NON-APPOINTMENT (OUTPATIENT)
Age: 72
End: 2022-07-21

## 2022-07-21 VITALS — SYSTOLIC BLOOD PRESSURE: 128 MMHG | DIASTOLIC BLOOD PRESSURE: 70 MMHG

## 2022-07-21 VITALS — SYSTOLIC BLOOD PRESSURE: 144 MMHG | HEART RATE: 68 BPM | OXYGEN SATURATION: 97 % | DIASTOLIC BLOOD PRESSURE: 94 MMHG

## 2022-07-21 PROCEDURE — 93000 ELECTROCARDIOGRAM COMPLETE: CPT

## 2022-07-21 PROCEDURE — 93306 TTE W/DOPPLER COMPLETE: CPT

## 2022-07-21 PROCEDURE — 99214 OFFICE O/P EST MOD 30 MIN: CPT

## 2022-07-21 RX ORDER — TRIAMCINOLONE ACETONIDE 1 MG/G
0.1 OINTMENT TOPICAL
Qty: 80 | Refills: 0 | Status: DISCONTINUED | COMMUNITY
Start: 2022-03-23

## 2022-07-21 RX ORDER — ACETAMINOPHEN 325 MG/1
325 TABLET ORAL
Qty: 100 | Refills: 0 | Status: DISCONTINUED | COMMUNITY
Start: 2022-03-23

## 2022-07-21 RX ORDER — HYDRALAZINE HYDROCHLORIDE 50 MG/1
50 TABLET ORAL
Qty: 180 | Refills: 3 | Status: DISCONTINUED | COMMUNITY
Start: 2018-10-01 | End: 2022-07-21

## 2022-07-21 RX ORDER — DILTIAZEM HYDROCHLORIDE 60 MG/1
60 TABLET ORAL
Qty: 180 | Refills: 0 | Status: DISCONTINUED | COMMUNITY
Start: 2022-03-23

## 2022-07-21 RX ORDER — ASPIRIN ENTERIC COATED TABLETS 81 MG 81 MG/1
81 TABLET, DELAYED RELEASE ORAL
Refills: 0 | Status: DISCONTINUED | COMMUNITY
Start: 2022-06-24 | End: 2022-07-21

## 2022-07-27 NOTE — DISCUSSION/SUMMARY
[FreeTextEntry1] : 72 year old man with HTN, HLD, DM and permanent atrial fibrillation who presents to the office for follow-up. \par \par Rex recently was admitted for COVID and PNA. He is essentially bed bound at this point. He is euvolemic on exam. Needs much more PT. Needs to fu with neurology regarding his progressive motor disease. \par \par Neurosurgeon has cleared him to resume AC given resolution of his SDH. He will resume Eliquis 2.5mg q12. He will stop his ASA. Likely in the future he will need to increase his eliquis to  5mg q12. He will need to see EP to assess for watchman once better optimized. \par \par His blood pressure and heart rate are controlled. He will continue Losartan 25mg and Cardizem 120mg Qday.  I have counseled them on assessing for orthostatic symptoms given that functionally he is a paraplegic at this time. \par He will continue his Crestor and Vascepa for mixed hyperlipidemia.\par \par Exercise, diet and lifestyle modification counseling was performed  to reduce his CV risk.  \par \par He will follow with me in 3 months. he will follow up with you fall of his other medical issues. [EKG obtained to assist in diagnosis and management of assessed problem(s)] : EKG obtained to assist in diagnosis and management of assessed problem(s)

## 2022-07-27 NOTE — CARDIOLOGY SUMMARY
[de-identified] : poor baseline\par Atrial  fibrillation  \par LAFB low voltage. \par nonspecific T wave changes\par  [de-identified] : 7/02/20 nromal LV function mild LVH \par 6/2022 noraml LV function

## 2022-08-05 ENCOUNTER — APPOINTMENT (OUTPATIENT)
Dept: INTERNAL MEDICINE | Facility: CLINIC | Age: 72
End: 2022-08-05

## 2022-08-08 ENCOUNTER — APPOINTMENT (OUTPATIENT)
Dept: INTERNAL MEDICINE | Facility: CLINIC | Age: 72
End: 2022-08-08

## 2022-08-08 ENCOUNTER — NON-APPOINTMENT (OUTPATIENT)
Age: 72
End: 2022-08-08

## 2022-08-08 VITALS
OXYGEN SATURATION: 93 % | HEIGHT: 67 IN | BODY MASS INDEX: 22.76 KG/M2 | HEART RATE: 83 BPM | TEMPERATURE: 97.9 F | SYSTOLIC BLOOD PRESSURE: 120 MMHG | WEIGHT: 145 LBS | DIASTOLIC BLOOD PRESSURE: 78 MMHG

## 2022-08-08 DIAGNOSIS — K21.9 GASTRO-ESOPHAGEAL REFLUX DISEASE W/OUT ESOPHAGITIS: ICD-10-CM

## 2022-08-08 DIAGNOSIS — E78.00 PURE HYPERCHOLESTEROLEMIA, UNSPECIFIED: ICD-10-CM

## 2022-08-08 DIAGNOSIS — M25.50 PAIN IN UNSPECIFIED JOINT: ICD-10-CM

## 2022-08-08 DIAGNOSIS — E83.42 HYPOMAGNESEMIA: ICD-10-CM

## 2022-08-08 DIAGNOSIS — K86.9 DISEASE OF PANCREAS, UNSPECIFIED: ICD-10-CM

## 2022-08-08 DIAGNOSIS — N40.0 BENIGN PROSTATIC HYPERPLASIA WITHOUT LOWER URINARY TRACT SYMPMS: ICD-10-CM

## 2022-08-08 PROCEDURE — 36415 COLL VENOUS BLD VENIPUNCTURE: CPT

## 2022-08-08 PROCEDURE — 99214 OFFICE O/P EST MOD 30 MIN: CPT | Mod: 25

## 2022-08-08 NOTE — PLAN
[FreeTextEntry1] : check pancreatic tumor marker\par routine blood\par check lipid b 12 lft and other\par non fasting bp good\par afib rate controlled\par need pt\par should use [place] catheter to prevent skin breakdown

## 2022-08-08 NOTE — PHYSICAL EXAM
[Normal] : soft, non-tender, non-distended, no masses palpated, no HSM and normal bowel sounds [de-identified] : afib at 72 [de-identified] : arm contractures present

## 2022-08-08 NOTE — HISTORY OF PRESENT ILLNESS
[FreeTextEntry1] : f/u [de-identified] : post brain injury\par post recent hospitalization covid and pneumonia\par ct scan show possible pancreas issue need f/u\par alertness issue\par need Tao lift \par unable to transfer getting pt\par in hospital [place] catheter and skin breakdown healed\par eating ok\par heartburn controlled by med\par afib on eliques and cardizem\par \par

## 2022-08-09 LAB
ALBUMIN SERPL ELPH-MCNC: 3.9 G/DL
ALP BLD-CCNC: 109 U/L
ALT SERPL-CCNC: 17 U/L
ANION GAP SERPL CALC-SCNC: 11 MMOL/L
AST SERPL-CCNC: 15 U/L
BASOPHILS # BLD AUTO: 0.06 K/UL
BASOPHILS NFR BLD AUTO: 0.7 %
BILIRUB SERPL-MCNC: 0.4 MG/DL
BUN SERPL-MCNC: 20 MG/DL
CALCIUM SERPL-MCNC: 9.2 MG/DL
CANCER AG19-9 SERPL-ACNC: 7 U/ML
CEA SERPL-MCNC: 2.3 NG/ML
CHLORIDE SERPL-SCNC: 106 MMOL/L
CHOLEST SERPL-MCNC: 145 MG/DL
CO2 SERPL-SCNC: 26 MMOL/L
CREAT SERPL-MCNC: 0.98 MG/DL
EGFR: 83 ML/MIN/1.73M2
EOSINOPHIL # BLD AUTO: 0.2 K/UL
EOSINOPHIL NFR BLD AUTO: 2.3 %
ESTIMATED AVERAGE GLUCOSE: 151 MG/DL
FOLATE SERPL-MCNC: 12.5 NG/ML
GLUCOSE SERPL-MCNC: 181 MG/DL
HBA1C MFR BLD HPLC: 6.9 %
HCT VFR BLD CALC: 41.8 %
HDLC SERPL-MCNC: 45 MG/DL
HGB BLD-MCNC: 13.2 G/DL
IMM GRANULOCYTES NFR BLD AUTO: 0.6 %
LDLC SERPL CALC-MCNC: 62 MG/DL
LYMPHOCYTES # BLD AUTO: 1.02 K/UL
LYMPHOCYTES NFR BLD AUTO: 11.6 %
MAGNESIUM SERPL-MCNC: 1.9 MG/DL
MAN DIFF?: NORMAL
MCHC RBC-ENTMCNC: 25.5 PG
MCHC RBC-ENTMCNC: 31.6 GM/DL
MCV RBC AUTO: 80.9 FL
MONOCYTES # BLD AUTO: 0.69 K/UL
MONOCYTES NFR BLD AUTO: 7.9 %
NEUTROPHILS # BLD AUTO: 6.74 K/UL
NEUTROPHILS NFR BLD AUTO: 76.9 %
NONHDLC SERPL-MCNC: 101 MG/DL
PHOSPHATE SERPL-MCNC: 3.4 MG/DL
PLATELET # BLD AUTO: 199 K/UL
POTASSIUM SERPL-SCNC: 3.9 MMOL/L
PROT SERPL-MCNC: 5.7 G/DL
RBC # BLD: 5.17 M/UL
RBC # FLD: 15.6 %
SODIUM SERPL-SCNC: 143 MMOL/L
T4 FREE SERPL-MCNC: 1 NG/DL
TRIGL SERPL-MCNC: 193 MG/DL
TSH SERPL-ACNC: 0.45 UIU/ML
VIT B12 SERPL-MCNC: 490 PG/ML
WBC # FLD AUTO: 8.76 K/UL

## 2022-08-12 ENCOUNTER — NON-APPOINTMENT (OUTPATIENT)
Age: 72
End: 2022-08-12

## 2022-09-06 ENCOUNTER — RX RENEWAL (OUTPATIENT)
Age: 72
End: 2022-09-06

## 2022-09-13 ENCOUNTER — APPOINTMENT (OUTPATIENT)
Dept: CARDIOLOGY | Facility: CLINIC | Age: 72
End: 2022-09-13

## 2022-09-13 ENCOUNTER — APPOINTMENT (OUTPATIENT)
Dept: PHYSICAL MEDICINE AND REHAB | Facility: CLINIC | Age: 72
End: 2022-09-13

## 2022-09-13 VITALS
HEART RATE: 82 BPM | DIASTOLIC BLOOD PRESSURE: 109 MMHG | TEMPERATURE: 97.2 F | OXYGEN SATURATION: 98 % | SYSTOLIC BLOOD PRESSURE: 158 MMHG

## 2022-09-13 PROCEDURE — 99204 OFFICE O/P NEW MOD 45 MIN: CPT

## 2022-09-13 NOTE — ASSESSMENT
[FreeTextEntry1] : - Cognitive- Recently decreased Ritialin to one pill, advised to increase to two pills in the morning.\par - Can consider Amantadine for initiation- however, some aspects of behavior can be due to seizure and/or hydrocephalus - would like to get evaluation from neurology to evaluate for possible etiology of recent mental status changes. \par - Spasticity- can consider botox int he future for R FFs (some component of contracture but also w spasticity) and R EFs.  \par -  Rash- follows w derm- appt tomorrow.\par - Wife will call after evaluation w neurology for possible initiation of amantadine.

## 2022-09-13 NOTE — HISTORY OF PRESENT ILLNESS
[FreeTextEntry1] : Patient had a fall in August 2021 w TBI\par Went to [facility] for СЕРГЕЙ x 6 months.\par Went home in March 2022. \par Getting outpatient rehab at [place]. \par Since discharge home in March 2022 had episodes of COVID PNA.\par Wife states that he has been less attentive, less initiation. \par \par \par Current Meds: Gabapentin uncertain dose TID, Eliquis, Losartan, Ritalin daily (was taking two a day), Wellbutrin BID (Patient uncertain as to dosage of the meds), Cardizem, Vitamin D.

## 2022-09-13 NOTE — REASON FOR VISIT
[Initial Evaluation] : an initial evaluation [Spouse] : spouse [Formal Caregiver] : formal caregiver

## 2022-09-13 NOTE — PHYSICAL EXAM
[Normal] : Normal bowel sounds, soft, non-tender, no hepato-splenomegaly and no abdominal mass palpated [de-identified] : EOMI [de-identified] : diffuse rash [de-identified] : Alert;Very slow processing, AAO x 2 , follows verbal instruction , motor fair grade UEs, trace LEs, MAS 2 spasticity R EFs/FFs, diffuse hypertonia  [de-identified] : AAO x 2

## 2022-09-14 ENCOUNTER — TRANSCRIPTION ENCOUNTER (OUTPATIENT)
Age: 72
End: 2022-09-14

## 2022-10-17 ENCOUNTER — NON-APPOINTMENT (OUTPATIENT)
Age: 72
End: 2022-10-17

## 2022-10-19 ENCOUNTER — EMERGENCY (EMERGENCY)
Facility: HOSPITAL | Age: 72
LOS: 1 days | Discharge: ANOTHER HOSPITAL | End: 2022-10-19
Attending: EMERGENCY MEDICINE | Admitting: EMERGENCY MEDICINE
Payer: COMMERCIAL

## 2022-10-19 ENCOUNTER — INPATIENT (INPATIENT)
Facility: HOSPITAL | Age: 72
LOS: 2 days | Discharge: HOME HEALTH | DRG: 83 | End: 2022-10-22
Attending: INTERNAL MEDICINE | Admitting: EMERGENCY MEDICINE
Payer: COMMERCIAL

## 2022-10-19 VITALS
SYSTOLIC BLOOD PRESSURE: 148 MMHG | OXYGEN SATURATION: 98 % | HEIGHT: 65 IN | RESPIRATION RATE: 16 BRPM | WEIGHT: 149.91 LBS | TEMPERATURE: 98 F | DIASTOLIC BLOOD PRESSURE: 98 MMHG | HEART RATE: 106 BPM

## 2022-10-19 VITALS
WEIGHT: 149.91 LBS | DIASTOLIC BLOOD PRESSURE: 104 MMHG | OXYGEN SATURATION: 97 % | HEIGHT: 65 IN | SYSTOLIC BLOOD PRESSURE: 166 MMHG | HEART RATE: 106 BPM | TEMPERATURE: 98 F | RESPIRATION RATE: 20 BRPM

## 2022-10-19 VITALS
TEMPERATURE: 98 F | SYSTOLIC BLOOD PRESSURE: 159 MMHG | DIASTOLIC BLOOD PRESSURE: 93 MMHG | RESPIRATION RATE: 17 BRPM | HEART RATE: 95 BPM | OXYGEN SATURATION: 95 %

## 2022-10-19 DIAGNOSIS — I62.9 NONTRAUMATIC INTRACRANIAL HEMORRHAGE, UNSPECIFIED: ICD-10-CM

## 2022-10-19 LAB
ALBUMIN SERPL ELPH-MCNC: 3.8 G/DL — SIGNIFICANT CHANGE UP (ref 3.3–5)
ALBUMIN SERPL ELPH-MCNC: 4.1 G/DL — SIGNIFICANT CHANGE UP (ref 3.3–5)
ALP SERPL-CCNC: 108 U/L — SIGNIFICANT CHANGE UP (ref 40–120)
ALP SERPL-CCNC: 119 U/L — SIGNIFICANT CHANGE UP (ref 40–120)
ALT FLD-CCNC: 11 U/L — SIGNIFICANT CHANGE UP (ref 10–45)
ALT FLD-CCNC: 20 U/L — SIGNIFICANT CHANGE UP (ref 12–78)
ANION GAP SERPL CALC-SCNC: 11 MMOL/L — SIGNIFICANT CHANGE UP (ref 5–17)
ANION GAP SERPL CALC-SCNC: 6 MMOL/L — SIGNIFICANT CHANGE UP (ref 5–17)
APTT BLD: 36.3 SEC — HIGH (ref 27.5–35.5)
APTT BLD: 42.8 SEC — HIGH (ref 27.5–35.5)
AST SERPL-CCNC: 7 U/L — LOW (ref 15–37)
AST SERPL-CCNC: 8 U/L — LOW (ref 10–40)
BASOPHILS # BLD AUTO: 0.04 K/UL — SIGNIFICANT CHANGE UP (ref 0–0.2)
BASOPHILS # BLD AUTO: 0.05 K/UL — SIGNIFICANT CHANGE UP (ref 0–0.2)
BASOPHILS NFR BLD AUTO: 0.3 % — SIGNIFICANT CHANGE UP (ref 0–2)
BASOPHILS NFR BLD AUTO: 0.3 % — SIGNIFICANT CHANGE UP (ref 0–2)
BILIRUB SERPL-MCNC: 0.8 MG/DL — SIGNIFICANT CHANGE UP (ref 0.2–1.2)
BILIRUB SERPL-MCNC: 0.9 MG/DL — SIGNIFICANT CHANGE UP (ref 0.2–1.2)
BLD GP AB SCN SERPL QL: NEGATIVE — SIGNIFICANT CHANGE UP
BUN SERPL-MCNC: 11 MG/DL — SIGNIFICANT CHANGE UP (ref 7–23)
BUN SERPL-MCNC: 11 MG/DL — SIGNIFICANT CHANGE UP (ref 7–23)
CALCIUM SERPL-MCNC: 9.3 MG/DL — SIGNIFICANT CHANGE UP (ref 8.4–10.5)
CALCIUM SERPL-MCNC: 9.5 MG/DL — SIGNIFICANT CHANGE UP (ref 8.5–10.1)
CHLORIDE SERPL-SCNC: 102 MMOL/L — SIGNIFICANT CHANGE UP (ref 96–108)
CHLORIDE SERPL-SCNC: 105 MMOL/L — SIGNIFICANT CHANGE UP (ref 96–108)
CK MB CFR SERPL CALC: 1.8 NG/ML — SIGNIFICANT CHANGE UP (ref 0–3.6)
CO2 SERPL-SCNC: 26 MMOL/L — SIGNIFICANT CHANGE UP (ref 22–31)
CO2 SERPL-SCNC: 30 MMOL/L — SIGNIFICANT CHANGE UP (ref 22–31)
CREAT SERPL-MCNC: 0.81 MG/DL — SIGNIFICANT CHANGE UP (ref 0.5–1.3)
CREAT SERPL-MCNC: 0.95 MG/DL — SIGNIFICANT CHANGE UP (ref 0.5–1.3)
EGFR: 86 ML/MIN/1.73M2 — SIGNIFICANT CHANGE UP
EGFR: 95 ML/MIN/1.73M2 — SIGNIFICANT CHANGE UP
EOSINOPHIL # BLD AUTO: 0.06 K/UL — SIGNIFICANT CHANGE UP (ref 0–0.5)
EOSINOPHIL # BLD AUTO: 0.06 K/UL — SIGNIFICANT CHANGE UP (ref 0–0.5)
EOSINOPHIL NFR BLD AUTO: 0.4 % — SIGNIFICANT CHANGE UP (ref 0–6)
EOSINOPHIL NFR BLD AUTO: 0.4 % — SIGNIFICANT CHANGE UP (ref 0–6)
GLUCOSE SERPL-MCNC: 179 MG/DL — HIGH (ref 70–99)
GLUCOSE SERPL-MCNC: 196 MG/DL — HIGH (ref 70–99)
HCT VFR BLD CALC: 44.6 % — SIGNIFICANT CHANGE UP (ref 39–50)
HCT VFR BLD CALC: 45.1 % — SIGNIFICANT CHANGE UP (ref 39–50)
HGB BLD-MCNC: 14.6 G/DL — SIGNIFICANT CHANGE UP (ref 13–17)
HGB BLD-MCNC: 14.7 G/DL — SIGNIFICANT CHANGE UP (ref 13–17)
IMM GRANULOCYTES NFR BLD AUTO: 0.4 % — SIGNIFICANT CHANGE UP (ref 0–0.9)
IMM GRANULOCYTES NFR BLD AUTO: 0.6 % — SIGNIFICANT CHANGE UP (ref 0–0.9)
INR BLD: 1.35 RATIO — HIGH (ref 0.88–1.16)
INR BLD: 1.43 RATIO — HIGH (ref 0.88–1.16)
LIDOCAIN IGE QN: 339 U/L — SIGNIFICANT CHANGE UP (ref 73–393)
LYMPHOCYTES # BLD AUTO: 0.97 K/UL — LOW (ref 1–3.3)
LYMPHOCYTES # BLD AUTO: 1.26 K/UL — SIGNIFICANT CHANGE UP (ref 1–3.3)
LYMPHOCYTES # BLD AUTO: 6.7 % — LOW (ref 13–44)
LYMPHOCYTES # BLD AUTO: 9.3 % — LOW (ref 13–44)
MCHC RBC-ENTMCNC: 25.4 PG — LOW (ref 27–34)
MCHC RBC-ENTMCNC: 25.9 PG — LOW (ref 27–34)
MCHC RBC-ENTMCNC: 32.4 GM/DL — SIGNIFICANT CHANGE UP (ref 32–36)
MCHC RBC-ENTMCNC: 33 GM/DL — SIGNIFICANT CHANGE UP (ref 32–36)
MCV RBC AUTO: 78.5 FL — LOW (ref 80–100)
MCV RBC AUTO: 78.6 FL — LOW (ref 80–100)
MONOCYTES # BLD AUTO: 1.25 K/UL — HIGH (ref 0–0.9)
MONOCYTES # BLD AUTO: 1.29 K/UL — HIGH (ref 0–0.9)
MONOCYTES NFR BLD AUTO: 8.7 % — SIGNIFICANT CHANGE UP (ref 2–14)
MONOCYTES NFR BLD AUTO: 9.5 % — SIGNIFICANT CHANGE UP (ref 2–14)
NEUTROPHILS # BLD AUTO: 10.85 K/UL — HIGH (ref 1.8–7.4)
NEUTROPHILS # BLD AUTO: 12.01 K/UL — HIGH (ref 1.8–7.4)
NEUTROPHILS NFR BLD AUTO: 80.1 % — HIGH (ref 43–77)
NEUTROPHILS NFR BLD AUTO: 83.3 % — HIGH (ref 43–77)
NRBC # BLD: 0 /100 WBCS — SIGNIFICANT CHANGE UP (ref 0–0)
NRBC # BLD: 0 /100 WBCS — SIGNIFICANT CHANGE UP (ref 0–0)
PLATELET # BLD AUTO: 216 K/UL — SIGNIFICANT CHANGE UP (ref 150–400)
PLATELET # BLD AUTO: 230 K/UL — SIGNIFICANT CHANGE UP (ref 150–400)
POTASSIUM SERPL-MCNC: 3.8 MMOL/L — SIGNIFICANT CHANGE UP (ref 3.5–5.3)
POTASSIUM SERPL-MCNC: 4.1 MMOL/L — SIGNIFICANT CHANGE UP (ref 3.5–5.3)
POTASSIUM SERPL-SCNC: 3.8 MMOL/L — SIGNIFICANT CHANGE UP (ref 3.5–5.3)
POTASSIUM SERPL-SCNC: 4.1 MMOL/L — SIGNIFICANT CHANGE UP (ref 3.5–5.3)
PROT SERPL-MCNC: 6.6 G/DL — SIGNIFICANT CHANGE UP (ref 6–8.3)
PROT SERPL-MCNC: 7.4 G/DL — SIGNIFICANT CHANGE UP (ref 6–8.3)
PROTHROM AB SERPL-ACNC: 15.8 SEC — HIGH (ref 10.5–13.4)
PROTHROM AB SERPL-ACNC: 16.6 SEC — HIGH (ref 10.5–13.4)
RBC # BLD: 5.68 M/UL — SIGNIFICANT CHANGE UP (ref 4.2–5.8)
RBC # BLD: 5.74 M/UL — SIGNIFICANT CHANGE UP (ref 4.2–5.8)
RBC # FLD: 15.9 % — HIGH (ref 10.3–14.5)
RBC # FLD: 15.9 % — HIGH (ref 10.3–14.5)
RH IG SCN BLD-IMP: POSITIVE — SIGNIFICANT CHANGE UP
SARS-COV-2 RNA SPEC QL NAA+PROBE: SIGNIFICANT CHANGE UP
SODIUM SERPL-SCNC: 139 MMOL/L — SIGNIFICANT CHANGE UP (ref 135–145)
SODIUM SERPL-SCNC: 141 MMOL/L — SIGNIFICANT CHANGE UP (ref 135–145)
TROPONIN I, HIGH SENSITIVITY RESULT: 8.2 NG/L — SIGNIFICANT CHANGE UP
WBC # BLD: 13.56 K/UL — HIGH (ref 3.8–10.5)
WBC # BLD: 14.43 K/UL — HIGH (ref 3.8–10.5)
WBC # FLD AUTO: 13.56 K/UL — HIGH (ref 3.8–10.5)
WBC # FLD AUTO: 14.43 K/UL — HIGH (ref 3.8–10.5)

## 2022-10-19 PROCEDURE — 36415 COLL VENOUS BLD VENIPUNCTURE: CPT

## 2022-10-19 PROCEDURE — 70450 CT HEAD/BRAIN W/O DYE: CPT | Mod: 26,MA

## 2022-10-19 PROCEDURE — 99291 CRITICAL CARE FIRST HOUR: CPT

## 2022-10-19 PROCEDURE — 83690 ASSAY OF LIPASE: CPT

## 2022-10-19 PROCEDURE — U0005: CPT

## 2022-10-19 PROCEDURE — 96374 THER/PROPH/DIAG INJ IV PUSH: CPT | Mod: XU

## 2022-10-19 PROCEDURE — U0003: CPT

## 2022-10-19 PROCEDURE — 80053 COMPREHEN METABOLIC PANEL: CPT

## 2022-10-19 PROCEDURE — 84484 ASSAY OF TROPONIN QUANT: CPT

## 2022-10-19 PROCEDURE — 93005 ELECTROCARDIOGRAM TRACING: CPT

## 2022-10-19 PROCEDURE — 70450 CT HEAD/BRAIN W/O DYE: CPT | Mod: 26

## 2022-10-19 PROCEDURE — 74177 CT ABD & PELVIS W/CONTRAST: CPT | Mod: 26,MA

## 2022-10-19 PROCEDURE — 85025 COMPLETE CBC W/AUTO DIFF WBC: CPT

## 2022-10-19 PROCEDURE — 82553 CREATINE MB FRACTION: CPT

## 2022-10-19 PROCEDURE — 85610 PROTHROMBIN TIME: CPT

## 2022-10-19 PROCEDURE — 93010 ELECTROCARDIOGRAM REPORT: CPT

## 2022-10-19 PROCEDURE — 70450 CT HEAD/BRAIN W/O DYE: CPT | Mod: MA

## 2022-10-19 PROCEDURE — 99291 CRITICAL CARE FIRST HOUR: CPT | Mod: 25

## 2022-10-19 PROCEDURE — 74177 CT ABD & PELVIS W/CONTRAST: CPT | Mod: MA

## 2022-10-19 PROCEDURE — 85730 THROMBOPLASTIN TIME PARTIAL: CPT

## 2022-10-19 RX ORDER — CHLORHEXIDINE GLUCONATE 213 G/1000ML
1 SOLUTION TOPICAL DAILY
Refills: 0 | Status: DISCONTINUED | OUTPATIENT
Start: 2022-10-19 | End: 2022-10-20

## 2022-10-19 RX ORDER — POLYETHYLENE GLYCOL 3350 17 G/17G
17 POWDER, FOR SOLUTION ORAL
Refills: 0 | Status: DISCONTINUED | OUTPATIENT
Start: 2022-10-19 | End: 2022-10-22

## 2022-10-19 RX ORDER — ANDEXANET ALFA 200 MG/20ML
480 INJECTION, POWDER, LYOPHILIZED, FOR SOLUTION INTRAVENOUS ONCE
Refills: 0 | Status: COMPLETED | OUTPATIENT
Start: 2022-10-19 | End: 2022-10-19

## 2022-10-19 RX ORDER — ATORVASTATIN CALCIUM 80 MG/1
40 TABLET, FILM COATED ORAL AT BEDTIME
Refills: 0 | Status: DISCONTINUED | OUTPATIENT
Start: 2022-10-19 | End: 2022-10-22

## 2022-10-19 RX ORDER — SODIUM CHLORIDE 9 MG/ML
1000 INJECTION INTRAMUSCULAR; INTRAVENOUS; SUBCUTANEOUS ONCE
Refills: 0 | Status: COMPLETED | OUTPATIENT
Start: 2022-10-19 | End: 2022-10-19

## 2022-10-19 RX ORDER — HYDRALAZINE HCL 50 MG
10 TABLET ORAL EVERY 6 HOURS
Refills: 0 | Status: DISCONTINUED | OUTPATIENT
Start: 2022-10-19 | End: 2022-10-19

## 2022-10-19 RX ORDER — ANDEXANET ALFA 200 MG/20ML
400 INJECTION, POWDER, LYOPHILIZED, FOR SOLUTION INTRAVENOUS ONCE
Refills: 0 | Status: COMPLETED | OUTPATIENT
Start: 2022-10-19 | End: 2022-10-19

## 2022-10-19 RX ORDER — LABETALOL HCL 100 MG
10 TABLET ORAL ONCE
Refills: 0 | Status: DISCONTINUED | OUTPATIENT
Start: 2022-10-19 | End: 2022-10-19

## 2022-10-19 RX ORDER — ONDANSETRON 8 MG/1
4 TABLET, FILM COATED ORAL ONCE
Refills: 0 | Status: COMPLETED | OUTPATIENT
Start: 2022-10-19 | End: 2022-10-19

## 2022-10-19 RX ORDER — SODIUM CHLORIDE 9 MG/ML
1000 INJECTION INTRAMUSCULAR; INTRAVENOUS; SUBCUTANEOUS
Refills: 0 | Status: DISCONTINUED | OUTPATIENT
Start: 2022-10-19 | End: 2022-10-20

## 2022-10-19 RX ORDER — ANDEXANET ALFA 200 MG/20ML
960 INJECTION, POWDER, LYOPHILIZED, FOR SOLUTION INTRAVENOUS ONCE
Refills: 0 | Status: DISCONTINUED | OUTPATIENT
Start: 2022-10-19 | End: 2022-10-19

## 2022-10-19 RX ORDER — SENNA PLUS 8.6 MG/1
2 TABLET ORAL AT BEDTIME
Refills: 0 | Status: DISCONTINUED | OUTPATIENT
Start: 2022-10-19 | End: 2022-10-22

## 2022-10-19 RX ORDER — ANDEXANET ALFA 200 MG/20ML
800 INJECTION, POWDER, LYOPHILIZED, FOR SOLUTION INTRAVENOUS ONCE
Refills: 0 | Status: DISCONTINUED | OUTPATIENT
Start: 2022-10-19 | End: 2022-10-19

## 2022-10-19 RX ORDER — GABAPENTIN 400 MG/1
300 CAPSULE ORAL THREE TIMES A DAY
Refills: 0 | Status: DISCONTINUED | OUTPATIENT
Start: 2022-10-19 | End: 2022-10-22

## 2022-10-19 RX ADMIN — Medication 10 MILLIGRAM(S): at 20:36

## 2022-10-19 RX ADMIN — ANDEXANET ALFA 184.62 MILLIGRAM(S): 200 INJECTION, POWDER, LYOPHILIZED, FOR SOLUTION INTRAVENOUS at 16:09

## 2022-10-19 RX ADMIN — SODIUM CHLORIDE 1000 MILLILITER(S): 9 INJECTION INTRAMUSCULAR; INTRAVENOUS; SUBCUTANEOUS at 12:00

## 2022-10-19 RX ADMIN — SODIUM CHLORIDE 75 MILLILITER(S): 9 INJECTION INTRAMUSCULAR; INTRAVENOUS; SUBCUTANEOUS at 19:44

## 2022-10-19 RX ADMIN — ONDANSETRON 4 MILLIGRAM(S): 8 TABLET, FILM COATED ORAL at 12:00

## 2022-10-19 RX ADMIN — SODIUM CHLORIDE 75 MILLILITER(S): 9 INJECTION INTRAMUSCULAR; INTRAVENOUS; SUBCUTANEOUS at 18:55

## 2022-10-19 RX ADMIN — ANDEXANET ALFA 24 MILLIGRAM(S): 200 INJECTION, POWDER, LYOPHILIZED, FOR SOLUTION INTRAVENOUS at 16:14

## 2022-10-19 NOTE — ED ADULT TRIAGE NOTE - IDEAL BODY WEIGHT(KG)
[No Acute Distress] : no acute distress [Well Nourished] : well nourished [Well Developed] : well developed [Well-Appearing] : well-appearing [Normal Sclera/Conjunctiva] : normal sclera/conjunctiva [PERRL] : pupils equal round and reactive to light [EOMI] : extraocular movements intact [Normal Outer Ear/Nose] : the outer ears and nose were normal in appearance [Normal Oropharynx] : the oropharynx was normal [No JVD] : no jugular venous distention [No Lymphadenopathy] : no lymphadenopathy [Supple] : supple 62 [Thyroid Normal, No Nodules] : the thyroid was normal and there were no nodules present [No Respiratory Distress] : no respiratory distress  [No Accessory Muscle Use] : no accessory muscle use [Clear to Auscultation] : lungs were clear to auscultation bilaterally [Normal Rate] : normal rate  [Regular Rhythm] : with a regular rhythm [Normal S1, S2] : normal S1 and S2 [No Murmur] : no murmur heard [No Carotid Bruits] : no carotid bruits [No Abdominal Bruit] : a ~M bruit was not heard ~T in the abdomen [No Varicosities] : no varicosities [Pedal Pulses Present] : the pedal pulses are present [No Edema] : there was no peripheral edema [No Palpable Aorta] : no palpable aorta [No Extremity Clubbing/Cyanosis] : no extremity clubbing/cyanosis [Soft] : abdomen soft [Non Tender] : non-tender [Non-distended] : non-distended [No Masses] : no abdominal mass palpated [No HSM] : no HSM [Normal Bowel Sounds] : normal bowel sounds [Normal Posterior Cervical Nodes] : no posterior cervical lymphadenopathy [Normal Anterior Cervical Nodes] : no anterior cervical lymphadenopathy [No CVA Tenderness] : no CVA  tenderness [No Spinal Tenderness] : no spinal tenderness [No Joint Swelling] : no joint swelling [Grossly Normal Strength/Tone] : grossly normal strength/tone [No Rash] : no rash [Coordination Grossly Intact] : coordination grossly intact [No Focal Deficits] : no focal deficits [Normal Gait] : normal gait [Deep Tendon Reflexes (DTR)] : deep tendon reflexes were 2+ and symmetric [Normal Affect] : the affect was normal [Normal Insight/Judgement] : insight and judgment were intact

## 2022-10-19 NOTE — ED PROVIDER NOTE - PROGRESS NOTE DETAILS
discussed with neurosurgery - no AC reversal at this time. closely monitor BPs but no urgent need for antihypertensives at this time. repeat CTH at 4-hr interval (4:45pm) - Alvin Ahn PA-C Attending (Russ Goldsmith D.O.):  Discussed with neurosurg ICU attg. Given reversal. Low dose ordered based on eliquis 2.5mg q12h. Spoke with pharmacist who will confirm with dr. Royal.

## 2022-10-19 NOTE — ED ADULT NURSE NOTE - OBJECTIVE STATEMENT
patient comes to ED for evaluation of vomiting x1 at home pt has TBI after a fall is patient comes to ED for evaluation of vomiting x1 decreased appetite and per wife pt is just not right  pt has TBI after a fall one year ago pt is awake lethargic responsive to verbal and painful stimuli

## 2022-10-19 NOTE — ED PROVIDER NOTE - ATTENDING CONTRIBUTION TO CARE
Attending (Russ Goldsmith D.O.): I have personally seen and examined this patient. I have performed a substantive portion of the visit including all aspects of the medical decision making. Resident, Fellow, and/or ACP note reviewed. I agree on the plan of care except where noted.    I have personally provided 31 minutes of critical care concurrently with the resident(s) and/or ACP(s), excluding time spent on separate procedures.     see mdm

## 2022-10-19 NOTE — PROGRESS NOTE ADULT - SUBJECTIVE AND OBJECTIVE BOX
SUMMARY:    HOSPITAL COURSE:    24 HOUR EVENTS:     ADMISSION SCORES:   GCS: HH: MF: NIHSS: ICH Score:    SEDATION SCORES:  RASS: CAM-ICU:     REVIEW OF SYSTEMS:    ALLERGIES: Allergies    No Known Allergies    Intolerances        VITALS/DATA/ORDERS: [] Reviewed    DEVICES:   [] Restraints [] PIVs [] ET tube [] central line [] PICC [] arterial line [] mcmahon [] NGT/OGT [] EVD [] LD [] ANNEL/HMV [] LiCOX [] ICP monitor [] Trach [] PEG [] Chest Tube [] other:    EXAMINATION:  General: No acute distress  HEENT: Anicteric sclerae  Cardiac: [id number]  Lungs: Clear  Abdomen: Soft, non-tender, +BS  Extremities: No c/c/e  Skin/Incision Site: Clean, dry and intact  Neurologic: Awake, alert, fully oriented, follows commands, PERRL, VFFtc, EOMI, face symmetric, tongue midline, no drift, full strength SUMMARY: 72M BPH CAD, HTN, HLD, Afib on eliquis hx IPH 1yr ago, presented to [place] w/vom this AM, LAST dose of eliquis this AM. CTH showed L IPH with parafalcine SDH.  Plt 216 PTT 42.8 INR 1.35    HOSPITAL COURSE: No significant events since patient's transferred [place]. CTH repeat was stable.    24 HOUR EVENTS: As per HPI    ADMISSION SCORES:   GCS: 14    REVIEW OF SYSTEMS:  REVIEW OF SYSTEMS: [] Unable to Assess due to neurologic exam   [x ] All ROS addressed below are non-contributory, except:  Neuro: [ ] Headache [ ] Back pain [ ] Numbness [ ] Weakness [ ] Ataxia [ ] Dizziness [ ] Aphasia [ ] Dysarthria [ ] Visual disturbance  Resp: [ ] Shortness of breath/dyspnea [ ] Orthopnea [ ] Cough  CV: [ ] Chest pain [ ] Palpitation [ ] Lightheadedness [ ] Syncope  Renal: [ ] Thirst [ ] Edema  GI: [ ] Nausea [ ] Emesis [ ] Abdominal pain [ ] Constipation [ ] Diarrhea  Hem: [ ] Hematemesis [ ] bBright red blood per rectum  ID: [ ] Fever [ ] Chills [ ] Dysuria  ENT: [ ] Rhinorrhea    ALLERGIES: Allergies    No Known Allergies    Intolerances        VITALS/DATA/ORDERS: [x] Reviewed    DEVICES:   [] Restraints [x] PIVs [] ET tube [] central line [] PICC [] arterial line [] mcmahon [] NGT/OGT [] EVD [] LD [] ANNEL/HMV [] LiCOX [] ICP monitor [] Trach [] PEG [] Chest Tube [] other:    EXAMINATION:  General: No acute distress  HEENT: Anicteric sclerae  Cardiac: [id number]  Lungs: Clear  Abdomen: Soft, non-tender, +BS  Extremities: No c/c/e  Skin/Incision Site: Clean, dry and intact  Neurologic: Awake, alert, oriented x2 (does not know the year), follows commands, PERRL, VFFtc, EOMI, L facial asymmetry, tongue midline, RUE contracted at the elbow but able to move it antigravity, RLE trace movements. LUE at least 4/5, LLE 1/5. Right left apraxia.

## 2022-10-19 NOTE — ED PROVIDER NOTE - CRITICAL CARE ATTENDING CONTRIBUTION TO CARE
Patient was critically ill with a high probability of imminent or life threatening deterioration.  direct patient care (not related to procedure), additional history taking, interpretation of diagnostic studies, documentation, consultation with other physicians, telephone consultation with the patient's family  I have personally and independently provided the amount of critical care time documented below excluding time spent on separate procedures.  40 mins    I have seen and evaluated the patient face to face, endorse the HPI, PE, as edited and/or reviewed by me as needed and have indicated my contribution to care in the MDM section.

## 2022-10-19 NOTE — ED PROVIDER NOTE - CLINICAL SUMMARY MEDICAL DECISION MAKING FREE TEXT BOX
Attending (Russ Goldsmith D.O.):  72M hx of TBI in past, contractures of all 4 extremities, afib on eliquis 2.5mg q12h, cardizem here as a transfer from [facility] after being found to have Large left parafalcine subdural hemorrhage, is new since 6/14/2022, measures at least 5.7 x 2.0 x 4.8 cm (AP x transverse x CC), tracks along the anterior and posterior interhemispheric falx and left tentorium. Small parenchymal and or subarachnoid hemorrhage noted within the left frontoparietal region. Brain bleed suspected in setting of fall 2 days prior w/ original presentation to [facility] for decreased mentation, lethargy, and NBNB emesis. Report of taking am eliquis but vomiting shortly thereafter. Discussion had at the time w/ Dr. Black by SERGE at [facility], hold reversal agents. Neurosurg consulted upon patient arrival here, also rec holding reversal agent. RPT vitals w/ BP 120s systolic. Patient able to move all 4 xtremities, lower extrmities to tactile stimuli, upper extremity to command. PERRL 3mm, EOMI. Nontender chest wall, stable pelvis. No abdominal wall bruising. Will need to eval for interval worsening of brain bleed. check labs, maintain T&S, elevated HOB, close hemodynamic monitoring.

## 2022-10-19 NOTE — ED PROVIDER NOTE - OBJECTIVE STATEMENT
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 pt BIBA as transfer from [place] ED for acute and large left parafalcine subdural hemorrhage up to 5.7cm without midline shift. presented to [facility] for vomiting. pt is on eliquis 2.5mg BID, took am dose but vomited. HTNsive 160s en route now normotensive. bedbound with upper extremities contracted at baseline. per wife pt had fall last week. 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 pt BIBA as transfer from [place] ED for acute and large left parafalcine subdural hemorrhage up to 5.7cm without midline shift. presented to [facility] for vomiting. pt is on eliquis 2.5mg BID, took am dose but vomited. HTNsive SBP 160s en route now normotensive SBP 120s, afib rate 100s. bedbound with upper extremities contracted at baseline. per wife pt had fall last week. limited ROS with pt AMS

## 2022-10-19 NOTE — ED PROVIDER NOTE - OBJECTIVE STATEMENT
Patient is a 72-year-old male with past ministry of A. fib not on AC BPH CAD hypertension lipidemia depression peripheral neuropathy pneumonia TBI brain bleed due to fall about 1 year ago status post continue to removal brought in by EMS for 1 episode of vomiting.  Wife states patient not been himself for the past 2 days her appetite is more drowsy no recent falls no chest pain shortness of breath or abdominal pain.  He states he vomited a large amount so got concerned.  Patient has also been having elevated blood pressures which cardiologist  Has been trying to manage an increased dose of hydralazine 2 days ago. Patient is a 72-year-old male with past ministry of A. fib not on AC BPH CAD hypertension lipidemia depression peripheral neuropathy pneumonia TBI brain bleed due to fall about 1 year ago status post continue to removal brought in by EMS for 1 episode of vomiting.  Wife states patient not been himself for the past 2 days her appetite is more drowsy no recent falls no chest pain shortness of breath or abdominal pain.  He states he vomited a large amount so got concerned.  Patient has also been having elevated blood pressures which cardiologist Dr. Goldsmith been trying to manage an increased dose of hydralazine 2 days ago. Patient is a 72-year-old male with past ministry of A. fib on Eliquis last dose today but vomited it up BPH CAD hypertension lipidemia depression peripheral neuropathy pneumonia TBI brain bleed due to fall about 1 year ago status post continue to removal brought in by EMS for 1 episode of vomiting.  Wife states patient not been himself for the past 2 days her appetite is more drowsy no recent falls no chest pain shortness of breath or abdominal pain.  He states he vomited a large amount so got concerned.  Patient has also been having elevated blood pressures which cardiologist Dr. Goldsmith been trying to manage an increased dose of hydralazine 2 days ago.

## 2022-10-19 NOTE — ED PROVIDER NOTE - NSICDXPASTMEDICALHX_GEN_ALL_CORE_FT
PAST MEDICAL HISTORY:  Atrial fibrillation     BPH (benign prostatic hyperplasia)     CAD (coronary artery disease)     HLD (hyperlipidemia)     HTN (hypertension)     Major depression     Peripheral neuropathy     Pneumonia due to COVID-19 virus June 2022    TBI (traumatic brain injury) brain bleed

## 2022-10-19 NOTE — ED ADULT NURSE NOTE - OBJECTIVE STATEMENT
pt BIBA transferred from [place], as per EMS, "pt has SDH. couple days he was lethargic and has a hx of TBI. wife noticed change in mental status and had x1 episode of vomiting today." pt AOx1 speaking in full complete sentences at present. pt denies lightheadedness, dizziness, HA, chest pain, SOB, abd. pain, n/d, numbness/tingling at present. pt has decreased ROM in B/L lower extremities, pt withdraws to painful stimuli. decreased upper extremity strength R > L.

## 2022-10-19 NOTE — ED ADULT TRIAGE NOTE - CHIEF COMPLAINT QUOTE
Tx from [facility] hospital for head bleed  Vomiting, AMS x 2 days  Confused at baseline

## 2022-10-19 NOTE — H&P ADULT - ASSESSMENT
72M BPH CAD HTN HLD Afib on eliquis hx IPH 1yr ago, presented to [facility] w/v[facility] this AM, ingested eliquis this AM. CTH showed L IPH w/parafalcine SDH. Plt 216 PTT 42.8 INR 1.35  Exam: AOx2, PERRL, EOMI, no facial, no drift, LUE 5/5 RUE 4/5 LLE 2/5 (baseline per wife) RLE 4+/5 (baseline), SILT  -Repeat CTH @4:20pm and if stable tomorrow @noon  -Andexxa for eliquis reversal  -No acute neurosurgery intervention  -adm NSCU under intensivist, q1h neurochecks  -SBP <160

## 2022-10-19 NOTE — PROGRESS NOTE ADULT - ASSESSMENT
ASSESSMENT/PLAN:    NEURO:  Activity: [] mobilize as tolerated [] Bedrest [] PT [] OT [] PMNR    PULM:    CV:  SBP goal    RENAL:  Fluids:    GI:  Diet:  GI prophylaxis [] not indicated [] PPI [] other:  Bowel regimen [] colace [] senna [] other:    ENDO:   Goal euglycemia (-180)    HEME/ONC:  VTE prophylaxis: [] SCDs [] chemoprophylaxis [] hold chemoprophylaxis due to: [] high risk of DVT/PE on admission due to:    ID:    MISC:    SOCIAL/FAMILY:  [] awaiting [] updated at bedside [] family meeting    CODE STATUS:  [] Full Code [] DNR [] DNI [] Palliative/Comfort Care    DISPOSITION:  [] ICU [] Stroke Unit [] Floor [] EMU [] RCU [] PCU    [] Patient is at high risk of neurologic deterioration/death due to:     Time seen:  Time spent: ___ [] critical care minutes    Contact: [phone] ASSESSMENT/PLAN: 72M BPH CAD, HTN, HLD, Afib on eliquis hx IPH 1yr ago, presented to [facility] w/vom this AM, LAST dose of eliquis this AM. CTH showed L IPH with parafalcine SDH.  Plt 216 PTT 42.8 INR 1.35. Patient s/p andexxa at [facility].    NEURO:   Neurochecks Q1H  Repeat CTH stable today, will repeat CTH 10/20 Am  Patient on home Wellbutrin, gabapentin, and tizanidine- will restart once patient passes swallow evaluation  s/p andexxa   Activity: [x] mobilize as tolerated [x] Bedrest [] PT [] OT [] PMNR    PULM:  Saturating on RA  Will obtain a routine CXR    CV:  SBP goal 100-160 mmHg  At home on cardizem  mg QD and Losartan 25 mg QD  Consider restarting home meds  EKG will be ordered  Will restart home atorvastatin    RENAL:   Fluids: IVF with NaCl at 75 cc/hour    GI:  Diet: NPO  Will obtain A1C  GI prophylaxis [x] not indicated [] PPI [] other:  Bowel regimen [x] colace [x] senna     ENDO:   Goal euglycemia (-180)    HEME/ONC:  VTE prophylaxis: [x] SCDs [] chemoprophylaxis [] hold chemoprophylaxis due to: [] high risk of DVT/PE on admission due to:    ID:  Afebrile, leukocytosis trending down  Will continue to monitor for signs of infection and will follow up CXR    MISC:    SOCIAL/FAMILY:  [x] awaiting [] updated at bedside [] family meeting    CODE STATUS:  [] Full Code [] DNR [] DNI [] Palliative/Comfort Care    DISPOSITION:  [x] ICU [] Stroke Unit [] Floor [] EMU [] RCU [] PCU

## 2022-10-19 NOTE — H&P ADULT - NSHPPHYSICALEXAM_GEN_ALL_CORE
Exam: AOx2, PERRL, EOMI, no facial, no drift, LUE 5/5 RUE 4/5 LLE 2/5 (baseline per wife) RLE 4+/5 (baseline), SILT

## 2022-10-19 NOTE — ED PROVIDER NOTE - CLINICAL SUMMARY MEDICAL DECISION MAKING FREE TEXT BOX
Lashae: Patient is a 72-year-old male with past ministry of A. fib on Eliquis last dose today but vomited it up BPH CAD hypertension lipidemia depression peripheral neuropathy pneumonia TBI brain bleed due to fall about 1 year ago status post continue to removal brought in by EMS for 1 episode of vomiting.  Wife states patient not been himself for the past 2 days her appetite is more drowsy no recent falls no chest pain shortness of breath or abdominal pain.  He states he vomited a large amount so got concerned.  Patient has also been having elevated blood pressures which cardiologist Dr. Goldsmith been trying to manage an increased dose of hydralazine 2 days ago.  pt found to have new SDH, acute bleed. will transf to [facility].

## 2022-10-19 NOTE — ED PROVIDER NOTE - DATE/TIME 2
Hospitalist Progress Note      PCP: Serena Huang MD, MD    Date of Admission: 6/6/2022      Subjective: Still complaining of decreased tasting on the right side. Denies fever chills nausea or vomiting no abdominal pain no headache or blurry vision. No numbness in his face no weakness in his extremities. Medications:  Reviewed    Infusion Medications    sodium chloride Stopped (06/11/22 1904)    dextrose       Scheduled Medications    amoxicillin-clavulanate  1 tablet Oral 2 times per day    predniSONE  20 mg Oral Daily    guaiFENesin-codeine  5 mL Oral Q4H    itraconazole  200 mg Oral BID    insulin glargine  40 Units SubCUTAneous Daily    insulin lispro  8 Units SubCUTAneous TID WC    sodium chloride flush  10 mL IntraVENous 2 times per day    [Held by provider] aspirin  81 mg Oral Daily    carvedilol  25 mg Oral BID WC    dicyclomine  10 mg Oral 4x Daily AC & HS    DULoxetine  30 mg Oral Daily    gabapentin  300 mg Oral TID    NIFEdipine  60 mg Oral Daily    rosuvastatin  40 mg Oral Nightly    insulin lispro  0-12 Units SubCUTAneous TID WC    insulin lispro  0-6 Units SubCUTAneous Nightly    [Held by provider] enoxaparin  30 mg SubCUTAneous BID     PRN Meds: sodium chloride flush, sodium chloride, promethazine **OR** ondansetron, magnesium hydroxide, acetaminophen **OR** acetaminophen, albuterol sulfate HFA, glucose, dextrose bolus **OR** dextrose bolus, glucagon (rDNA), dextrose      Intake/Output Summary (Last 24 hours) at 6/20/2022 0732  Last data filed at 6/19/2022 1748  Gross per 24 hour   Intake 300 ml   Output --   Net 300 ml       Physical Exam Performed:    /71   Pulse 85   Temp 97.7 °F (36.5 °C) (Oral)   Resp 18   Ht 6' 1\" (1.854 m)   Wt 261 lb 12.8 oz (118.8 kg)   SpO2 96%   PF (!) 18 L/min   BMI 34.54 kg/m²     General appearance: No apparent distress  Neck: Supple  Respiratory:  Normal respiratory effort.  Clear to auscultation, bilaterally without Rales/Wheezes/Rhonchi. Cardiovascular: Regular rate and rhythm with normal S1/S2 without murmurs, rubs or gallops. Abdomen: Soft, non-tender, non-distended  Musculoskeletal: No clubbing, cyanosis  Skin: Skin color, texture, turgor normal.  No rashes or lesions. Neurologic:  No focal weakness   Psychiatric: Alert and oriented  Capillary Refill: Brisk,3 seconds, normal   Peripheral Pulses: +2 palpable, equal bilaterally       Labs:   Recent Labs     06/19/22  0806   WBC 4.6   HGB 13.1*   HCT 40.1*        Recent Labs     06/18/22  0555 06/19/22  0806    137   K 4.2 3.9    102   CO2 21 23   BUN 26* 17   CREATININE 1.2 1.1   CALCIUM 8.7 9.2     Recent Labs     06/19/22  0806   AST 10*   ALT 14   BILIDIR <0.2   BILITOT <0.2   ALKPHOS 86     No results for input(s): INR in the last 72 hours. No results for input(s): Ardelle Chalk in the last 72 hours. Urinalysis:      Lab Results   Component Value Date    NITRU Negative 06/07/2022    WBCUA 1 06/07/2022    BACTERIA None Seen 06/07/2022    RBCUA 1 06/07/2022    BLOODU TRACE-INTACT 06/07/2022    SPECGRAV >=1.030 06/07/2022    GLUCOSEU >=1000 06/07/2022    GLUCOSEU NEGATIVE 01/02/2011       Radiology:  XR CHEST PORTABLE   Final Result   Stable chest.  Persistent right upper lobe infiltrate consistent with   pneumonia. Continued plain film follow-up recommended. XR CHEST 1 VIEW   Final Result      FLUORO FOR SURGICAL PROCEDURES   Final Result      MRI BRAIN WO CONTRAST   Final Result   There are few areas of high signal in the periventricular white matter. These are nonspecific. Demyelination could be considered in the appropriate   clinical setting. Chronic small vessel ischemic changes, vasculitis, or   migraines can cause a similar finding. No other brain parenchymal   abnormality. CT CHEST WO CONTRAST   Final Result   1. Progressive consolidation in the anterior segment of the right upper lobe   consistent with pneumonia. There is no evidence of cavitation. The findings   are nonspecific but most consistent with bacterial etiology. 2. The remainder of the lungs are clear. No pleural fluid. 3. Stable small mediastinal nodes. The right hilar node noted previously is   not evaluated due to the lack of intravenous contrast.         XR CHEST PORTABLE   Final Result   Redemonstration of right upper lobe infiltrate which is likely pneumonic. It   is slightly larger compared to the previous evaluation. Assessment/Plan:    Active Hospital Problems    Diagnosis     Encounter for medication counseling [Z71.89]      Priority: Medium    Fever [R50.9]      Priority: Medium    Hemoptysis [R04.2]      Priority: Medium    Streptococcal infection [A49.1]      Priority: Medium    S/P bronchoscopy [Z98.890]      Priority: Medium    Seizure disorder ([facility] [G40.909]      Priority: Medium    Class 1 obesity due to excess calories with body mass index (BMI) of 34.0 to 34.9 in adult [E66.09, Z68.34]      Priority: Medium    Abnormal CT of the chest [R93.89]      Priority: Medium    Endobronchial mass [R91.8]      Priority: Medium    Community acquired pneumonia of right upper lobe of lung [J18.9]      Priority: Medium    Acute renal failure ([facility] [address] [N17.9]      Priority: Medium    Poorly controlled type 2 diabetes mellitus ([facility] [address] [E11.65]      Priority: Medium    Essential hypertension [I10]      Priority: Medium     1.  Abnormal imaging on CT scan along with positive histoplasma antibody, ID consulted status post bronchoscopy as patient had hemoptysis.  ID ordered serum histoplasma antigen with as not detected.  Started on empiric itraconazole BID for 3 weeks per ID.   Added steroids per ID, continue for 5 days.  Follow up with Dr Zoltan Cohen on discharge.   2.  Streptococcal pneumonia, was on KING MARILY changed to Augmentin per ID. Stop date 06/27  3.   Hemoptysis, status post bronchoscopy, likely hemorrhage from previous site of biopsy.  Monitor for now pulmonology following. 4. Generalized weakness, improving  5.  CHELE on top of CKD stage III, improved, morning labs pending. 6.  Diabetes mellitus, sliding scale.  Much better controlled  7.  Essential hypertension, p.o. medications  8. Stated that he lost his taste, checked COVID negative, consulted neurology, apparently recommended MRI, contact neurology and discussed in details. Repeat COVID test based on the recommendations. Diet: ADULT DIET; Regular; 4 carb choices (60 gm/meal);  Low Fat/Low Chol/High Fiber/BREE  Code Status: Full Code        Toribio Sanderson MD 19-Oct-2022 15:26

## 2022-10-19 NOTE — H&P ADULT - NSHPLABSRESULTS_GEN_ALL_CORE
CTH showed L IPH w/parafalcine SDH. Plt 216 PTT 42.8 INR 1.35 CTH showed L IPH w/parafalcine SDH. Plt 216 PTT 42.8 INR 1.35. ICH score 1 NIHSS 5

## 2022-10-19 NOTE — H&P ADULT - HISTORY OF PRESENT ILLNESS
72M BPH CAD HTN HLD Afib on eliquis hx IPH 1yr ago, presented to [facility] w/v[facility] this AM, ingested eliquis this AM

## 2022-10-19 NOTE — ED PROVIDER NOTE - PROGRESS NOTE DETAILS
upon further history wife states after last ct scan last week pt fell out of wheelchair   + subdural on ct scan spoke with Dr. Black neurosurgery at [facility] will accept to ER advised no medications at this time bp 147/84 NIH 4 chronic weakness and extremity contractures

## 2022-10-20 LAB
A1C WITH ESTIMATED AVERAGE GLUCOSE RESULT: 7.8 % — HIGH (ref 4–5.6)
ANION GAP SERPL CALC-SCNC: 13 MMOL/L — SIGNIFICANT CHANGE UP (ref 5–17)
ANION GAP SERPL CALC-SCNC: 14 MMOL/L — SIGNIFICANT CHANGE UP (ref 5–17)
BUN SERPL-MCNC: 11 MG/DL — SIGNIFICANT CHANGE UP (ref 7–23)
BUN SERPL-MCNC: 12 MG/DL — SIGNIFICANT CHANGE UP (ref 7–23)
CALCIUM SERPL-MCNC: 9.4 MG/DL — SIGNIFICANT CHANGE UP (ref 8.4–10.5)
CALCIUM SERPL-MCNC: 9.5 MG/DL — SIGNIFICANT CHANGE UP (ref 8.4–10.5)
CHLORIDE SERPL-SCNC: 103 MMOL/L — SIGNIFICANT CHANGE UP (ref 96–108)
CHLORIDE SERPL-SCNC: 104 MMOL/L — SIGNIFICANT CHANGE UP (ref 96–108)
CO2 SERPL-SCNC: 21 MMOL/L — LOW (ref 22–31)
CO2 SERPL-SCNC: 23 MMOL/L — SIGNIFICANT CHANGE UP (ref 22–31)
CREAT SERPL-MCNC: 0.71 MG/DL — SIGNIFICANT CHANGE UP (ref 0.5–1.3)
CREAT SERPL-MCNC: 0.78 MG/DL — SIGNIFICANT CHANGE UP (ref 0.5–1.3)
EGFR: 96 ML/MIN/1.73M2 — SIGNIFICANT CHANGE UP
EGFR: 99 ML/MIN/1.73M2 — SIGNIFICANT CHANGE UP
ESTIMATED AVERAGE GLUCOSE: 177 MG/DL — HIGH (ref 68–114)
GLUCOSE SERPL-MCNC: 183 MG/DL — HIGH (ref 70–99)
GLUCOSE SERPL-MCNC: 190 MG/DL — HIGH (ref 70–99)
HCT VFR BLD CALC: 45.8 % — SIGNIFICANT CHANGE UP (ref 39–50)
HGB BLD-MCNC: 14.8 G/DL — SIGNIFICANT CHANGE UP (ref 13–17)
MAGNESIUM SERPL-MCNC: 1.9 MG/DL — SIGNIFICANT CHANGE UP (ref 1.6–2.6)
MAGNESIUM SERPL-MCNC: 2.1 MG/DL — SIGNIFICANT CHANGE UP (ref 1.6–2.6)
MCHC RBC-ENTMCNC: 25.4 PG — LOW (ref 27–34)
MCHC RBC-ENTMCNC: 32.3 GM/DL — SIGNIFICANT CHANGE UP (ref 32–36)
MCV RBC AUTO: 78.7 FL — LOW (ref 80–100)
NRBC # BLD: 0 /100 WBCS — SIGNIFICANT CHANGE UP (ref 0–0)
PHOSPHATE SERPL-MCNC: 3 MG/DL — SIGNIFICANT CHANGE UP (ref 2.5–4.5)
PHOSPHATE SERPL-MCNC: 3.4 MG/DL — SIGNIFICANT CHANGE UP (ref 2.5–4.5)
PLATELET # BLD AUTO: 196 K/UL — SIGNIFICANT CHANGE UP (ref 150–400)
POTASSIUM SERPL-MCNC: 3.4 MMOL/L — LOW (ref 3.5–5.3)
POTASSIUM SERPL-MCNC: 5.8 MMOL/L — HIGH (ref 3.5–5.3)
POTASSIUM SERPL-SCNC: 3.4 MMOL/L — LOW (ref 3.5–5.3)
POTASSIUM SERPL-SCNC: 5.8 MMOL/L — HIGH (ref 3.5–5.3)
RBC # BLD: 5.82 M/UL — HIGH (ref 4.2–5.8)
RBC # FLD: 16.1 % — HIGH (ref 10.3–14.5)
SODIUM SERPL-SCNC: 138 MMOL/L — SIGNIFICANT CHANGE UP (ref 135–145)
SODIUM SERPL-SCNC: 140 MMOL/L — SIGNIFICANT CHANGE UP (ref 135–145)
T3 SERPL-MCNC: 70 NG/DL — LOW (ref 80–200)
T4 AB SER-ACNC: 7.8 UG/DL — SIGNIFICANT CHANGE UP (ref 4.6–12)
TSH SERPL-MCNC: 0.85 UIU/ML — SIGNIFICANT CHANGE UP (ref 0.27–4.2)
WBC # BLD: 14.33 K/UL — HIGH (ref 3.8–10.5)
WBC # FLD AUTO: 14.33 K/UL — HIGH (ref 3.8–10.5)

## 2022-10-20 PROCEDURE — 70450 CT HEAD/BRAIN W/O DYE: CPT | Mod: 26

## 2022-10-20 PROCEDURE — 99291 CRITICAL CARE FIRST HOUR: CPT

## 2022-10-20 PROCEDURE — 93970 EXTREMITY STUDY: CPT | Mod: 26

## 2022-10-20 PROCEDURE — 99233 SBSQ HOSP IP/OBS HIGH 50: CPT

## 2022-10-20 PROCEDURE — 93306 TTE W/DOPPLER COMPLETE: CPT | Mod: 26

## 2022-10-20 RX ORDER — ASPIRIN/CALCIUM CARB/MAGNESIUM 324 MG
1 TABLET ORAL
Qty: 0 | Refills: 0 | DISCHARGE

## 2022-10-20 RX ORDER — BUPROPION HYDROCHLORIDE 150 MG/1
1 TABLET, EXTENDED RELEASE ORAL
Qty: 0 | Refills: 0 | DISCHARGE

## 2022-10-20 RX ORDER — TIZANIDINE 4 MG/1
1 TABLET ORAL
Qty: 0 | Refills: 0 | DISCHARGE

## 2022-10-20 RX ORDER — TAMSULOSIN HYDROCHLORIDE 0.4 MG/1
1 CAPSULE ORAL
Qty: 0 | Refills: 0 | DISCHARGE

## 2022-10-20 RX ORDER — BETHANECHOL CHLORIDE 25 MG
1 TABLET ORAL
Qty: 0 | Refills: 0 | DISCHARGE

## 2022-10-20 RX ORDER — LOSARTAN POTASSIUM 100 MG/1
50 TABLET, FILM COATED ORAL DAILY
Refills: 0 | Status: DISCONTINUED | OUTPATIENT
Start: 2022-10-20 | End: 2022-10-22

## 2022-10-20 RX ORDER — CHOLECALCIFEROL (VITAMIN D3) 125 MCG
1 CAPSULE ORAL
Qty: 0 | Refills: 0 | DISCHARGE

## 2022-10-20 RX ORDER — CHLORHEXIDINE GLUCONATE 213 G/1000ML
1 SOLUTION TOPICAL
Refills: 0 | Status: DISCONTINUED | OUTPATIENT
Start: 2022-10-20 | End: 2022-10-20

## 2022-10-20 RX ORDER — LATANOPROST 0.05 MG/ML
1 SOLUTION/ DROPS OPHTHALMIC; TOPICAL AT BEDTIME
Refills: 0 | Status: DISCONTINUED | OUTPATIENT
Start: 2022-10-20 | End: 2022-10-22

## 2022-10-20 RX ORDER — POTASSIUM CHLORIDE 20 MEQ
20 PACKET (EA) ORAL
Refills: 0 | Status: COMPLETED | OUTPATIENT
Start: 2022-10-20 | End: 2022-10-20

## 2022-10-20 RX ADMIN — Medication 20 MILLIEQUIVALENT(S): at 23:41

## 2022-10-20 RX ADMIN — ATORVASTATIN CALCIUM 40 MILLIGRAM(S): 80 TABLET, FILM COATED ORAL at 22:40

## 2022-10-20 RX ADMIN — CHLORHEXIDINE GLUCONATE 1 APPLICATION(S): 213 SOLUTION TOPICAL at 11:49

## 2022-10-20 RX ADMIN — GABAPENTIN 300 MILLIGRAM(S): 400 CAPSULE ORAL at 14:11

## 2022-10-20 RX ADMIN — GABAPENTIN 300 MILLIGRAM(S): 400 CAPSULE ORAL at 22:39

## 2022-10-20 RX ADMIN — POLYETHYLENE GLYCOL 3350 17 GRAM(S): 17 POWDER, FOR SOLUTION ORAL at 17:17

## 2022-10-20 RX ADMIN — Medication 20 MILLIEQUIVALENT(S): at 20:30

## 2022-10-20 RX ADMIN — Medication 20 MILLIEQUIVALENT(S): at 17:17

## 2022-10-20 RX ADMIN — LATANOPROST 1 DROP(S): 0.05 SOLUTION/ DROPS OPHTHALMIC; TOPICAL at 23:41

## 2022-10-20 NOTE — CONSULT NOTE ADULT - ASSESSMENT
72 y.o. 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    - No signs of significant ischemia or volume overload.   - trops neg  - ekg is nonischemia    - Now with recurrent SDH.   - will need to stop Eliquis  - at this point would not rechallenge with AC  - antiplatelets when able  - will have to reeval for watchman when stable.     - Af is rate controlled  - resume cardizem 30mg q6 if possible    - HTN has recently been very labile  - resume losartan 50mg Qday if needed  - was getting hydralazine as well.     - Monitor and replete electrolytes. Keep K>4.0 and Mg>2.0.   - Further cardiac workup will depend on clinical course.

## 2022-10-20 NOTE — SPEECH LANGUAGE PATHOLOGY EVALUATION - SLP GESTURES
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  She has chronic respiratory failure and is CPAP dependent at night.  She presents for desaturations during treatments, associated with increase in secretions, tachypnea and oxygen requirement.  Chest x ray done shows bilateral patchy infiltrates.  She received IV antibiotics and was sent to the PICU for further observation.    Pertinent exam is as follows:  General Survey: sleepy, but easily arousable, on-verbal, in mild respiratory distress  Respitatory: bilateral rhonchi, no wheeze, + supraclavicular retractions  Cardiac: quiet precordium, distinct HS, regular rate  Abdomen: G tube site clean,  no discharge, flat  Extremities: cool but brisk refill, +1 peripheral pulses, no peripheral edema    09-13    142  |  103  |  14  ----------------------------<  90  5.0   |  24  |  1.14<H>    Ca    10.2      13 Sep 2019 11:44  Phos  4.1     09-13  Mg     1.9     09-13    TPro  8.4<H>  /  Alb  4.8  /  TBili  0.4  /  DBili  x   /  AST  21  /  ALT  4   /  AlkPhos  160  09-13    RVP - negative    Trach culture - Culture - Respiratory with Gram Stain (09.13.19 @ 12:45)    Gram Stain Sputum:   GPCPR^Gram Pos Cocci in Pairs  QUANTITY OF BACTERIA SEEN: FEW (2+)  GVR^GRAM VARIABLE RODS  QUANTITY OF BACTERIA SEEN: FEW (2+)  WBC^White Blood Cells  QNTY CELLS IN GRAM STAIN: MANY (4+)    Specimen Source: TRACHEAL ASPIRATE    A/P:  Patient likely with acute on chronic respiratory failure secondary to bacterial tracheitis.  Chest x ray  worse with bilateral infiltrates as compared to last x ray from April with increasing oxygen requirement, tachypnea and lethargy.  s/p trach change 2 days ago at home.      Will place on RTC CPAP therapy for now and monitor WOB.  Will escalate as needed based on O2 requirements and EtCo2  Will continue antibiotics and follow cultures  Will increase chest vest to q6 from q 12 and increase 3%NaCL to q 6 as well  Will continue all home meds for seizures, transplant status   Family updated as to plan of care and discussed plan with resident.    I spent a total of 45 minutes of face to face critical care time at patient's bedside.

## 2022-10-20 NOTE — PATIENT PROFILE ADULT - FALL HARM RISK - HARM RISK INTERVENTIONS

## 2022-10-20 NOTE — PHYSICAL THERAPY INITIAL EVALUATION ADULT - IMPAIRMENTS CONTRIBUTING IMPAIRED BED MOBILITY, REHAB EVAL
impaired balance/cognition/impaired coordination/abnormal muscle tone/pain/impaired postural control/decreased ROM/decreased strength

## 2022-10-20 NOTE — CONSULT NOTE ADULT - CONSULT REQUESTED DATE/TIME
20-Oct-2022 07:24 Medicare Annual Wellness Visit - Subsequent  Via telehealth  Name: Aleena Suarez Date: 3/26/2021   MRN: [id number] Sex: Male   Age: 63 y.o. Ethnicity: Non-/Non    : 1955 Race: Pritesh Macias is here for   Chief Complaint   Patient presents with   [facility]          Screenings for behavioral, psychosocial and functional/safety risks, and cognitive dysfunction are all negative except as indicated below. These results, as well as other patient data from the [address] form, are documented in Flowsheets linked to this Encounter. Allergies   Allergen Reactions    Adhesive Tape      Tears skin on leg       Prior to Visit Medications    Medication Sig Taking? Authorizing Provider   HYDROcodone-acetaminophen (NORCO)  MG per tablet Take 1 tablet by mouth every 6 hours as needed for Pain for up to 30 days.  Chronic use Yes Rochelle Ty MD   temazepam (RESTORIL) 30 MG capsule TAKE 1 CAPSULE BY MOUTH NIGHTLY AS NEEDED FOR SLEEP FOR 30 DAYS Yes Rochelle Ty MD   hydroCHLOROthiazide (HYDRODIURIL) 12.5 MG tablet Take 1 tablet by mouth every morning Yes Rochelle Ty MD   lisinopril (PRINIVIL;ZESTRIL) 40 MG tablet Take 1 tablet by mouth nightly Yes Rochelle Ty MD   pantoprazole (PROTONIX) 40 MG tablet TAKE 1 TABLET BY MOUTH IN THE MORNING BEFORE BREAKFAST Yes Rochelle Ty MD   tamsulosin (FLOMAX) 0.4 MG capsule Take 1 capsule by mouth daily Yes Rochelle Ty MD   ibuprofen (ADVIL;MOTRIN) 600 MG tablet Take 200 mg by mouth every 8 hours as needed for Pain Yes Historical Provider, MD   DAILY MULTIPLE VITAMINS TABS Take 1 tablet by mouth Yes Historical Provider, MD         Past Medical History:   Diagnosis Date    Arthritis     Asthma     Bradycardia     Broken ribs     CHF (congestive heart failure) ([address]     Chronic kidney disease, stage III (moderate) ([address] 9/15/2020    Chronic ulcer of left leg, with fat layer exposed ([address]  Diabetes mellitus ([address]     no meds; improved with weight loss    Diabetic peripheral neuropathy ([address] 3/8/2019    History of left below knee amputation ([address] 1/29/2019    Hx of BKA, left ([address] 5/25/2018    Hypertension     Idiopathic chronic venous hypertension of left leg with ulcer ([address] 12/22/2015    Morbid obesity ([address] 6/22/15    S/P BKA (below knee amputation) unilateral, left ([address] 5/30/2018    Skin ulcer of toe of left foot with fat layer exposed ([address] 4/4/2017    Sleep apnea in adult 06/22/2015    no cpap    Type 2 diabetes mellitus with foot ulcer (CODE) ([address] 11/15/2017    Ulcerated, foot, right, with fat layer exposed ([address] 4/4/2017    Venous hypertension, chronic, with ulcer, left 11/26/2017    Venous insufficiency of both lower extremities 6/22/15         Past Surgical History:   Procedure Laterality Date    ABDOMEN SURGERY  02/2018    treated in [facility] [address]      hx. of multiple; \"no blockage\"    DILATATION, ESOPHAGUS      GASTROSTOMY TUBE PLACEMENT  02/11/2018    temporary; now removed    KNEE ARTHROSCOPY      6 knee surgeries total; \"no replacements\"    HI AMPUTATION LOW LEG THRU TIB/FIB Left 5/25/2018    LEG AMPUTATION BELOW KNEE WITH MUSCLE FLAP  AND SKIN GRAFT CLOSURE AND APPLICATION OF WOUND VAC performed by Ortiz Castro MD at [place] [address]  02/11/2018    repair of gastric bypass    VASCULAR SURGERY  05/25/2018    [facility]. Leg amputation below knee with muscle flap and skin graft closure and application of wound vac stsg 14 cmx 15 cm.        Family History   Problem Relation Age of Onset    Diabetes Mother     Heart Disease Mother     High Blood Pressure Mother     Heart Disease Father     Other Father        CareTeam (Including outside providers/suppliers regularly involved in providing care):   Patient Care Team:  Alivia Mike MD as PCP - General (Family Medicine)  Alivia Mike MD Not on file     Physically abused: Not on file     Forced sexual activity: Not on file   Other Topics Concern    Not on file   Social History Narrative    Not on file        Substance Abuse Interventions:  · Not indicated    Health Risk Assessment:        General Health Risk Interventions:  · Not indicated       There is no height or weight on file to calculate BMI. Health Habits/Nutrition Interventions:  · Not indicated       Hearing/Vision Interventions:  · Suggested to get an eye exam       Safety Interventions:  · Not indicated       ADL Interventions:  · Not indicated    Personalized Preventive Plan   Current Health Maintenance Status  Immunization History   Administered Date(s) Administered    Tdap (Boostrix, Adacel) 04/30/2018        Health Maintenance   Topic Date Due    Hepatitis C screen  Never done    Pneumococcal 0-64 years Vaccine (1 of 1 - PPSV23) Never done    Diabetic retinal exam  Never done    HIV screen  Never done    Colon cancer screen colonoscopy  Never done    Annual Wellness Visit (AWV)  Never done    Flu vaccine (1) Never done    Diabetic foot exam  03/06/2021    Shingles Vaccine (1 of 2) 12/21/2021 (Originally 5/7/2007)    A1C test (Diabetic or Prediabetic)  12/15/2021    Lipid screen  12/15/2021    Potassium monitoring  12/15/2021    Creatinine monitoring  12/15/2021    DTaP/Tdap/Td vaccine (2 - Td) 04/30/2028    Hepatitis A vaccine  Aged Out    Hib vaccine  Aged Out    Meningococcal (ACWY) vaccine  Aged Out       Recommendations for [employer] Due: see orders.   Recommended screening schedule for the next 5-10 years is provided to the patient in written form: see Patient Instructions/AVS.

## 2022-10-20 NOTE — SWALLOW BEDSIDE ASSESSMENT ADULT - SWALLOW EVAL: DIAGNOSIS
Pt is 71 y/o male s/p  IPH w/parafalcine SDH who presents with h/o dysphagia with mild oropharyngeal deficits noted.  Pt presents with overtly functional oropharyngeal swallowing skills with soft and bite sized solids, purees and thin liquids.  Prolonged mastication noted with easy to chew solids with good Pt awareness.  Slight a-p spillage via Left labial margin infrequently noted.   No signs or symptoms of laryngeal penetration/aspiration evident with any consistency presented.  Pharyngeal swallow judged to be timely with decreased laryngeal elevation suspected.  Articulation is adequate however mild labial and lingual weakness noted on the Left.

## 2022-10-20 NOTE — OCCUPATIONAL THERAPY INITIAL EVALUATION ADULT - PERTINENT HX OF CURRENT PROBLEM, REHAB EVAL
72M BPH CAD HTN HLD Afib on eliquis hx IPH 1yr ago, presented to [facility] w/vom this AM, ingested eliquis this AM. CTH: Grossly stable subdural hemorrhage. No new or worsening intracranial hemorrhage. Stable moderate ventriculomegaly.

## 2022-10-20 NOTE — PHYSICAL THERAPY INITIAL EVALUATION ADULT - PERTINENT HX OF CURRENT PROBLEM, REHAB EVAL
72M BPH CAD HTN HLD Afib on eliquis hx IPH 1yr ago, presented to [facility] w/vom this AM, ingested eliquis this AM. CTH showed L IPH w/parafalcine SDH.  Tx to [facility] ICU, no acute neurosurgery intervention as per nsx.

## 2022-10-20 NOTE — SPEECH LANGUAGE PATHOLOGY EVALUATION - SLP PERTINENT HISTORY OF CURRENT PROBLEM
72M BPH CAD HTN HLD Afib on eliquis hx IPH 1yr ago, presented to [place] w/vom this AM, ingested eliquis this AM. CTH showed L IPH w/parafalcine SDH 72M BPH CAD HTN HLD Afib on eliquis hx IPH 1yr ago, presented to [facility] this AM, ingested eliquis this AM. CTH showed L IPH w/parafalcine SDH

## 2022-10-20 NOTE — SWALLOW BEDSIDE ASSESSMENT ADULT - COMMENTS
Per ED: Brain bleed suspected in setting of fall 2 days prior w/ original presentation to plv for decreased mentation, lethargy, and NBNB emesis. 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 Recd: will need to stop Eliquis; at this point would not rechallenge with AC.  7/5/22 MBS completed at OSH: Recommend:   1. Regular and thin liquids, via single/small cup sips only, as tolerated  2. Slow pacing, single/small bites/sips, and alternate solids with liquids  3. Maintain upright position during all meals  4. Maintain aspiration precautions  5. Maintain strict oral care Per ED: Brain bleed suspected in setting of fall 2 days prior w/ original presentation to [facility] for decreased mentation, lethargy, and NBNB emesis. 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 Recd: will need to stop Eliquis; at this point would not rechallenge with AC.  +Contractures of all extremities. Per ED: Brain bleed suspected in setting of fall 2 days prior w/ original presentation to [facility]v for decreased mentation, lethargy, and NBNB emesis. 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 Recd: will need to stop Eliquis; at this point would not rechallenge with AC.  +Contractures of all extremities.  7/5/22 MBS completed at OSH: Recommend:   1. Regular and thin liquids, via single/small cup sips only, as tolerated  2. Slow pacing, single/small bites/sips, and alternate solids with liquids  3. Maintain upright position during all meals  4. Maintain aspiration precautions  5. Maintain strict oral care

## 2022-10-20 NOTE — SWALLOW BEDSIDE ASSESSMENT ADULT - PHARYNGEAL PHASE
No signs or symptoms of laryngeal penetration/aspiration evident with any consistency presented.  Pharyngeal swallow judged to be timely with decreased laryngeal elevation suspected No signs or symptoms of laryngeal penetration/aspiration evident with any consistency presented.  Pharyngeal swallow judged to be timely;  decreased laryngeal elevation suspected No signs or symptoms of laryngeal penetration/aspiration evident

## 2022-10-20 NOTE — SWALLOW BEDSIDE ASSESSMENT ADULT - ORAL PREPARATORY PHASE
Within functional limits mildly prolonged mastication/Within functional limits mild to cup; anterior spillage via Left labial margin with larger sequential sip x 1./Within functional limits/Reduced oral grading

## 2022-10-20 NOTE — PHYSICAL THERAPY INITIAL EVALUATION ADULT - ADDITIONAL COMMENTS
Social hx obtained by pt's son at bedside as pt is a poor historian. Pt lives in a private house +1 step to enter. Lives with spouse, niece and 24/7 HHA. All needs met on main floor. PTA, pt was non-ambulatory and was wheelchair bound. As per pt's son at b/s family used patient lift device for all transfers bed to wheelchair. Pt owns W/C, patient lift device and hospital bed

## 2022-10-20 NOTE — PHARMACOTHERAPY INTERVENTION NOTE - COMMENTS
Per VERNON Hawkins she confirmed with patient' family home medications   Home Medications:  Eliquis 2.5 mg oral tablet: 1 tab(s) orally 2 times a day (10am, 10pm)t (20 Oct 2022 17:53)  gabapentin 300 mg oral capsule: 1 cap(s) orally 3 times a day (8am, 3pm, 10pm (20 Oct 2022 17:53)  latanoprost 0.005% ophthalmic solution: 1 drop(s) to each affected eye once a day (in the evening) (20 Oct 2022 17:53)  methylphenidate 10 mg oral tablet: 2 tab(s) orally once a day (in the morning) (20 Oct 2022 17:53)  rosuvastatin 10 mg oral tablet: 1 tab(s) orally once a day at bedtime (20 Oct 2022 17:53)  Diltiazem 120 mg daily  losartan 25 mg daily  hydralazine 50 mg every 8 hours

## 2022-10-20 NOTE — SWALLOW BEDSIDE ASSESSMENT ADULT - SLP GENERAL OBSERVATIONS
Pt awake, alert and oriented x 2. Pt answered simple self centered y/n questions and engaged in simple conversation with underlying cognitive linguistic deficits noted

## 2022-10-20 NOTE — SWALLOW BEDSIDE ASSESSMENT ADULT - SWALLOW EVAL: RECOMMENDED FEEDING/EATING TECHNIQUES
maintain upright posture during/after eating for 30 mins/small sips/bites Maintain Oral hygiene/maintain upright posture during/after eating for 30 mins/small sips/bites

## 2022-10-20 NOTE — PHYSICAL THERAPY INITIAL EVALUATION ADULT - ACTIVE RANGE OF MOTION EXAMINATION, REHAB EVAL
B/L mike flexion AROM limited to 90 degrees, L elbow flexion inc'd tone AAROM from 160-70 degrees/Right UE Active ROM was WFL (within functional limits)

## 2022-10-20 NOTE — SPEECH LANGUAGE PATHOLOGY EVALUATION - SLP PATIENT/FAMILY GOALS STATEMENT
SLP attempted to contact wife via telephone; left message as clinician wishes to obtain info re: Pt's baseline communication skills.

## 2022-10-20 NOTE — CHART NOTE - NSCHARTNOTEFT_GEN_A_CORE
CAPRINI SCORE [CLOT] Score on Admission for     AGE RELATED RISK FACTORS                                                       MOBILITY RELATED FACTORS  [ ] Age 41-60 years                                            (1 Point)                  [x] Bed rest                                                        (1 Point)  [x] Age 61-74 years                                           (2 Points)                 [ ] Plaster cast                                                   (2 Points)  [ ] Age > 75 years                                              (3 Points)                 [ ] Bed bound for more than 72 hours         (2 Points)    DISEASE RELATED RISK FACTORS                                               GENDER SPECIFIC FACTORS  [ ] Edema in the lower extremities                       (1 Point)           [ ] Pregnancy                                                          (1 Point)  [ ] Varicose veins                                               (1 Point)                  [ ] Post-partum < 6 weeks                                   (1 Point)             [ ] BMI > 25 Kg/m2                                            (1 Point)                  [ ] Hormonal therapy  or oral contraception   (1 Point)                 [ ] Sepsis (in the previous month)                        (1 Point)            [ ] History of pregnancy complications             (1 point)  [ ] Pneumonia or serious lung disease                                            [ ] Unexplained or recurrent               (1 Point)           (in the previous month)                               (1 Point)  [ ] Abnormal pulmonary function test                     (1 Point)                 SURGERY RELATED RISK FACTORS (include planned surgeries)  [ ] Acute myocardial infarction                              (1 Point)                 [ ]  Section                                             (1 Point)  [ ] Congestive heart failure (in the previous month)  (1 Point)        [ ] Minor surgery                                                  (1 Point)   [ ] Inflammatory bowel disease                             (1 Point)                 [ ] Arthroscopic surgery                                        (2 Points)  [ ] Central venous access                                      (2 Points)  [ ] History / presence of malignancy                   (2 points)   [ ] General surgery lasting more than 45 minutes   (2 Points)       [ ] Stroke (in the previous month)                          (5 Points)               [ ] Elective arthroplasty                                         (5 Points)                                                                                                                                               HEMATOLOGY RELATED FACTORS                                                 TRAUMA RELATED RISK FACTORS  [ ] Prior episodes of VTE                                     (3 Points)                [ ] Fracture of the hip, pelvis, or leg                       (5 Points)  [ ] Positive family history for VTE                         (3 Points)             [ ] Acute spinal cord injury (in the previous month)  (5 Points)  [ ] Prothrombin 42699 A                                     (3 Points)                [ ] Paralysis  (less than 1 month)                             (5 Points)  [ ] Factor V Leiden                                             (3 Points)                   [ ] Multiple Trauma within 1 month                        (5 Points)  [ ] Lupus anticoagulants                                     (3 Points)                                                           [ ] Anticardiolipin antibodies                               (3 Points)                                                       [ ] High homocysteine in the blood                      (3 Points)                                             [ ] Other congenital or acquired thrombophilia      (3 Points)                                                [ ] Heparin induced thrombocytopenia                  (3 Points)                                          Total Score [3]    Risk:  Very low 0   Low 1 to 2   Moderate 3 to 4   High =5       VTE Prophylaxis Recommendations:  [x] mechanical pneumatic compression devices                                      [ ] contraindicated: _____________________  [ ] chemoprophylaxis                                                                                    [ ] contraindicated: _____________________    **** HIGH LIKELIHOOD DVT PRESENT ON ADMISSION  [ ] (please order LE dopplers within 24 hours of admission)

## 2022-10-20 NOTE — PATIENT PROFILE ADULT - PATIENT'S SEXUAL ORIENTATION
Reviewed with patient and agree with technician note, also regarding: Past Ocular/Family Ocular Hx,   allergies and medications.   Past Ocular history notation:cataracts; MGD/dry eye; left concretions    Head/Face Exam: Normal  Mental Status:  Alert/Oriented x 3  See  other clinical data/information in relevant sections.    ASSESSMENT/PLAN  hordeolum left lower eyelid: Discussed nature of plugged gland, recommend hot compresses for 20minutes QID, Lid hygiene discussed, Discussed symptoms of preseptal cellulitis, pt to call if occurs for oral antibiotic treatment and is allergic to most AB, possible Keflex or doxy. FU call    May want to see Dr. Lees for cataracts                     Heterosexual

## 2022-10-20 NOTE — PROGRESS NOTE ADULT - ASSESSMENT
ASSESSMENT/PLAN: 72M BPH CAD, HTN, HLD, Afib on eliquis hx IPH 1yr ago, presented to [facility] w/vom this AM, LAST dose of eliquis this AM. CTH showed L IPH with parafalcine SDH.  Plt 216 PTT 42.8 INR 1.35. Patient s/p andexxa at [facility].    NEURO:   Neurochecks Q1H  Repeat CTH stable, will repeat CTH 10/20 am  Patient on home Wellbutrin, gabapentin, and tizanidine- will restart once patient passes swallow evaluation  s/p andexxa   Activity: [x] mobilize as tolerated [x] Bedrest [] PT [] OT [] PMNR    PULM:  Saturating on RA  Will obtain a routine CXR    CV:  SBP goal 100-160 mmHg  At home on cardizem  mg QD and Losartan 25 mg QD  Consider restarting home meds  EKG will be ordered  Will restart home atorvastatin    RENAL:   Fluids: IVF with NaCl at 75 cc/hour    GI:  Diet: NPO  Will obtain A1C  GI prophylaxis [x] not indicated [] PPI [] other:  Bowel regimen [x] colace [x] senna     ENDO:   Goal euglycemia (-180)    HEME/ONC:  VTE prophylaxis: [x] SCDs [] chemoprophylaxis [] hold chemoprophylaxis due to: [] high risk of DVT/PE on admission due to:    ID:  Afebrile, leukocytosis trending down  Will continue to monitor for signs of infection and will follow up CXR    MISC:    SOCIAL/FAMILY:  [x] awaiting [] updated at bedside [] family meeting    CODE STATUS:  [] Full Code [] DNR [] DNI [] Palliative/Comfort Care    DISPOSITION:  [x] ICU [] Stroke Unit [] Floor [] EMU [] RCU [] PCU   ASSESSMENT/PLAN: 72M BPH CAD, HTN, HLD, Afib on eliquis hx IPH 1yr ago, presented to [facility] w/vom this AM, LAST dose of eliquis this AM. CTH showed L IPH with parafalcine SDH.  Plt 216 PTT 42.8 INR 1.35. Patient s/p andexxa at [facility].    NEURO:   Neurochecks Q4H  Repeat CTH stable  Patient on home Wellbutrin, gabapentin, and tizanidine- will restart once patient needs speech/swallow  s/p andexxa   Activity: [x] mobilize as tolerated [x] Bedrest [] PT [] OT [] PMNR    PULM:  Saturating on RA  Will obtain a routine CXR    CV:  SBP goal 100-160 mmHg  At home on cardizem  mg QD and Losartan 25 mg QD  Consider restarting home meds  EKG will be ordered  Will restart home atorvastatin    RENAL:   Fluids: IVF with NaCl at 75 cc/hour    GI:  Diet: NPO  Will obtain A1C  GI prophylaxis [x] not indicated [] PPI [] other:  Bowel regimen [x] colace [x] senna     ENDO:   Goal euglycemia (-180)    HEME/ONC:  VTE prophylaxis: [x] SCDs [] chemoprophylaxis [] hold chemoprophylaxis due to: [] high risk of DVT/PE on admission due to:    ID:  Afebrile, leukocytosis trending down  Will continue to monitor for signs of infection and will follow up CXR    MISC:    SOCIAL/FAMILY:  [x] awaiting [] updated at bedside [] family meeting    CODE STATUS:  [] Full Code [] DNR [] DNI [] Palliative/Comfort Care    DISPOSITION:  [] ICU [] Stroke Unit [X] Floor [] EMU [] RCU [] PCU

## 2022-10-20 NOTE — SPEECH LANGUAGE PATHOLOGY EVALUATION - SLP DIAGNOSIS
Pt presents with fluent speech although moderate cognitive linguistic deficits evident characterized by receptive and expressive language deficits with underlying executive function deficits.  Pt answered simple y/n questions and followed multi-step commands.  Pt engaged in simple self centered conversation however decreased attention may be contributing factor to deficits with more complex auditory comprehension tasks.  Pt evidenced decreased memory, sequencing, problem solving and reasoning skills.  In addition, temporal orientation is impaired.  Articulatory precision judged to be adequate however mild Left sided labial asymmetry noted with decreased lingual strength and ROM reduced to the Right.

## 2022-10-20 NOTE — OCCUPATIONAL THERAPY INITIAL EVALUATION ADULT - LIVES WITH, PROFILE
lives in a private house +1 step with spouse, niece and 24/7 HHA, requires assistance for all ADLs and functional activities

## 2022-10-20 NOTE — SWALLOW BEDSIDE ASSESSMENT ADULT - ADDITIONAL RECOMMENDATIONS
Restorative swallow therapy. Will continue to follow while patient is in-house 1-2 times per week. Restorative swallow therapy. Trials of easy to chew with SLP during therapy prior to diet upgrade.  Will continue to follow while patient is in-house 1-2 times per week.

## 2022-10-20 NOTE — PHYSICAL THERAPY INITIAL EVALUATION ADULT - PASSIVE RANGE OF MOTION EXAMINATION, REHAB EVAL
R knee flexion PROM limited to -10 degrees extension, limited by increased tone and pain. L knee extension unable to perform PROM/AROM knee flexion 2/2 inc'd tone and pain./Right LE Passive ROM was WFL (within functional limits)/deficits as listed below

## 2022-10-20 NOTE — SPEECH LANGUAGE PATHOLOGY EVALUATION - COMMENTS
Per ED: Brain bleed suspected in setting of fall 2 days prior w/ original presentation to [facility] for decreased mentation, lethargy, and NBNB emesis. Report of taking am eliquis but vomiting shortly [id number]/20/22 Per Cardiology: hx of TBI after fall on AC, then resumed AC once cleared by neuro as an outpatient. Recd: will need to stop Eliquis; at this point would not rechallenge with AC.  7/5/22 MBS completed at OSH: Recommend:   1. Regular and thin liquids, via single/small cup sips only, as tolerated  2. Slow pacing, single/small bites/sips, and alternate solids with liquids  3. Maintain upright position during all meals  4. Maintain aspiration precautions  5. Maintain strict oral care Significant difficulty explaining tasks ie: changing a flat tire and making a cup of coffee; difficulty with semantic exclusion task f=3 Repetition required for material of paragraph length. deferred as glasses not at b/s Per ED: Brain bleed suspected in setting of fall 2 days prior w/ original presentation to [facility] for decreased mentation, lethargy, and NBNB emesis. 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 Recd: will need to stop Eliquis; at this point would not rechallenge with AC.  Contractures of all extremities.  7/5/22 MBS completed at OSH: Recommend:   1. Regular and thin liquids, via single/small cup sips only, as tolerated  2. Slow pacing, single/small bites/sips, and alternate solids with liquids  3. Maintain upright position during all meals  4. Maintain aspiration precautions  5. Maintain strict oral care

## 2022-10-20 NOTE — CONSULT NOTE ADULT - SUBJECTIVE AND OBJECTIVE BOX
CHIEF COMPLAINT: Patient is a 72y old  Male who presents with a chief complaint of IPH (20 Oct 2022 03:38)      HPI:  72 y.o. M with a PMH of HTN, unspecified motor disease, AFIB on AC , BPH, hx of TBI after fall on AC, then resumed AC once cleared by neuro as an outpatient, HLD, CAD, peripheral neuropathy presents N/V and found to have SDH with mass effect at PV and transfered to Neurosx unit. Denies any chest pain, dyspnea, palpitations, PND, orthopnea, near syncope, syncope, lower extremity edema, stroke like symptoms. Feels tired.     EKG: AF poor R wave progression, IWMI    REVIEW OF SYSTEMS:   All other review of systems are negative unless indicated above    PAST MEDICAL & SURGICAL HISTORY:  TBI (traumatic brain injury)  brain bleed      HTN (hypertension)      Atrial fibrillation      Peripheral neuropathy      Major depression      HLD (hyperlipidemia)      CAD (coronary artery disease)      BPH (benign prostatic hyperplasia)      Pneumonia due to COVID-19 virus  June 2022      No significant past surgical history          SOCIAL HISTORY:  No tobacco, ethanol, or drug abuse.    FAMILY HISTORY:    No family history of acute MI or sudden cardiac death.    MEDICATIONS  (STANDING):  atorvastatin 40 milliGRAM(s) Oral at bedtime  chlorhexidine 4% Liquid 1 Application(s) Topical daily  gabapentin 300 milliGRAM(s) Oral three times a day  polyethylene glycol 3350 17 Gram(s) Oral two times a day  sodium chloride 0.9%. 1000 milliLiter(s) (75 mL/Hr) IV Continuous <Continuous>    MEDICATIONS  (PRN):  senna 2 Tablet(s) Oral at bedtime PRN Constipation      Allergies    No Known Allergies    Intolerances        Home meds:  Home Medications:  ammonium lactate 12% topical lotion: 1 application topically 2 times a day (06 Jul 2022 16:27)  aspirin 81 mg oral tablet: 1 tab(s) orally once a day (01 Jul 2022 02:13)  bethanechol 10 mg oral tablet: 1 tab(s) orally 3 times a day at (8am, 3pm, 10pm) (01 Jul 2022 02:13)  buPROPion 75 mg oral tablet: 1 tab(s) orally 2 times a day (10am, 10pm) (01 Jul 2022 02:13)  cefuroxime 500 mg oral tablet: 1 tab(s) orally every 12 hours  for 1 more Day (06 Jul 2022 16:27)  Eliquis 2.5 mg oral tablet: 1 tab(s) orally 2 times a day (10am, 10pm)t (01 Jul 2022 02:13)  gabapentin 300 mg oral capsule: 1 cap(s) orally 3 times a day (8am, 3pm, 10pm (01 Jul 2022 02:13)  latanoprost 0.005% ophthalmic solution: 1 drop(s) to each affected eye once a day (in the evening) (01 Jul 2022 02:13)  methylphenidate 10 mg oral tablet: 2 tab(s) orally once a day (in the morning) (01 Jul 2022 02:13)  rosuvastatin 10 mg oral tablet: 1 tab(s) orally once a day (01 Jul 2022 02:13)  senna leaf extract oral tablet: 2 tab(s) orally once a day (at bedtime) (06 Jul 2022 16:27)  sodium chloride 0.65% nasal spray: 2 spray(s) nasal 4 times a day (06 Jul 2022 16:27)  tamsulosin 0.4 mg oral capsule: 1 cap(s) orally once a day (in the evening) (01 Jul 2022 02:13)  tiZANidine 4 mg oral tablet: 1 tab(s) orally 3 times a day (01 Jul 2022 02:13)  Vitamin D3 25 mcg (1000 intl units) oral tablet: 1 tab(s) orally once a day (01 Jul 2022 02:13)        VITAL SIGNS:   Vital Signs Last 24 Hrs  T(C): 37.1 (20 Oct 2022 03:00), Max: 37.1 (19 Oct 2022 23:00)  T(F): 98.7 (20 Oct 2022 03:00), Max: 98.7 (19 Oct 2022 23:00)  HR: 116 (20 Oct 2022 06:00) (93 - 125)  BP: 140/100 (20 Oct 2022 06:00) (126/92 - 166/104)  BP(mean): 114 (20 Oct 2022 06:00) (97 - 131)  RR: 22 (20 Oct 2022 06:00) (11 - 22)  SpO2: 97% (20 Oct 2022 06:00) (95% - 100%)    Parameters below as of 20 Oct 2022 03:00  Patient On (Oxygen Delivery Method): room air        I&O's Summary    19 Oct 2022 07:01  -  20 Oct 2022 07:00  --------------------------------------------------------  IN: 750 mL / OUT: 200 mL / NET: 550 mL        On Exam:  TELE: AF  Constitutional: NAD, awake    HEENT: Moist Mucous Membranes, Anicteric  Pulmonary: Decreased breath sounds b/l. No rales, crackles or wheeze appreciated.   Cardiovascular: IRRR, S1 and S2, No murmurs, rubs, gallops or clicks  Gastrointestinal: Bowel Sounds present, soft, nontender.   Lymph: No peripheral edema. No lymphadenopathy.  Skin: No visible rashes or ulcers.  Psych:  flat affect normal mood     LABS: All Labs Reviewed:                        14.8   14.33 )-----------( 196      ( 20 Oct 2022 02:22 )             45.8                         14.6   13.56 )-----------( 230      ( 19 Oct 2022 15:19 )             45.1                         14.7   14.43 )-----------( 216      ( 19 Oct 2022 12:27 )             44.6     20 Oct 2022 03:22    140    |  104    |  12     ----------------------------<  190    3.4     |  23     |  0.78   20 Oct 2022 02:22    138    |  103    |  11     ----------------------------<  183    5.8     |  21     |  0.71   19 Oct 2022 15:19    139    |  102    |  11     ----------------------------<  179    3.8     |  26     |  0.81     Ca    9.4        20 Oct 2022 03:22  Ca    9.5        20 Oct 2022 02:22  Ca    9.3        19 Oct 2022 15:19  Phos  3.0       20 Oct 2022 03:22  Phos  3.4       20 Oct 2022 02:22  Mg     1.9       20 Oct 2022 03:22  Mg     2.1       20 Oct 2022 02:22    TPro  6.6    /  Alb  4.1    /  TBili  0.8    /  DBili  x      /  AST  8      /  ALT  11     /  AlkPhos  108    19 Oct 2022 15:19  TPro  7.4    /  Alb  3.8    /  TBili  0.9    /  DBili  x      /  AST  7      /  ALT  20     /  AlkPhos  119    19 Oct 2022 12:27    PT/INR - ( 19 Oct 2022 15:19 )   PT: 16.6 sec;   INR: 1.43 ratio         PTT - ( 19 Oct 2022 15:19 )  PTT:36.3 sec  CARDIAC MARKERS ( 19 Oct 2022 12:27 )  x     / x     / x     / x     / 1.8 ng/mL    - Troponin: <-8.2  Blood Culture:           RADIOLOGY:    < from: CT Head No Cont (10.19.22 @ 12:46) >    ACC: [id number] EXAM:  CT BRAIN                          PROCEDURE DATE:  10/19/2022          INTERPRETATION:  CT HEAD WITHOUT IV CONTRAST    HISTORY: Vomiting. Additional history per EMR: "A. fib not on AC BPH CAD   hypertension lipidemia depressionperipheral neuropathy pneumonia TBI   brain bleed due to fall about 1 year ago status post continue to removal   brought in by EMS for 1 episode of vomiting. Wife states patient not been   himself for the past 2 days her appetite is more drowsy no recent falls".    COMPARISON: CT head 6/14/2022    TECHNIQUE: Contiguous axial images were obtained from the skull base to   the vertex without the administration of intravenous contrast. Coronal   and sagittal reformats were also obtained    FINDINGS:    Large left parafalcine subdural hemorrhage, is new since 6/14/2022. The   hemorrhage is hyperdense and likely acute in nature. The largest portion   of the hemorrhage is centered within the left parafalcine parietal   region, and measures at least 5.7 x 2.0 x 4.8 cm (AP x transverse x CC).   The subdural hemorrhage tracks along the anterior and posterior   interhemispheric falx and left tentorium. Small parenchymal and or   subarachnoid hemorrhage noted within the left frontoparietal region   (image 45, series 5).    Mass effect results in anterior anterior displacement of the left corpus   callosum splenium. Partial but significant effacement of the left lateral   ventricle body, atrium and occipital horn aspect is noted.    There is no midline shift. The basal cisterns are patent.    Postsurgical changes from right frontal craniotomy are stable. Adjacent   right frontal encephalomalacia/gliosis (with cortical involvement) is   similar to 6/4/2022, and is nonspecific but may reflect sequela of prior   trauma, infarct or hemorrhage. Ex vacuo dilatation of the adjacent right   frontal horn is noted.    There is no acute major vascular distribution infarction.    Scattered hypodensities within the subcortical/periventricular white   matter, nonspecific but most commonly a sequela of chronic small vessel   ischemia.    Partially empty sella, a nonspecific finding and similar to prior.   Nonspecific pineal gland calcification is noted.  No cerebellar tonsillar ectopia/herniation.    Bilateral mastoid/middle ear cavities are clear. Partially imaged orbits   and paranasal sinuses are grossly clear.      IMPRESSION:    Acute and large left parafalcine subdural hemorrhage. This subdural   hemorrhage is the largest within the left parafalcine parietal region (up   to to 5.7 cm), and tracks along the interhemispheric falx and left   tentorium. Mass effect, result in significant left lateral ventricle   effacement. No midline shift. Serial imaging follow-up is recommended.        Findings were discussed with covering ED provider (Dr. Bhardwaj) via   telephone on 10/19/2022 at 12:46 PM with verbal read back.    --- End of Report ---            ADILENE AHMADI MD; Attending Radiologist  This document has been electronically signed. Oct 19 2022 12:58PM    < end of copied text >

## 2022-10-20 NOTE — SWALLOW BEDSIDE ASSESSMENT ADULT - SLP PERTINENT HISTORY OF CURRENT PROBLEM
72M BPH CAD HTN HLD Afib on eliquis hx IPH 1yr ago, presented to [facility] this AM, ingested eliquis this AM. CTH showed L IPH w/parafalcine SDH

## 2022-10-20 NOTE — CONSULT NOTE ADULT - CRITICAL CARE ATTENDING COMMENT
Upon my evaluation, this patient is at high risk for imminent or life threatening deterioration due to cerebral hemorrhage   and other active medical issues which require my direct attention, intervention, and personal management.  I have personally spent >30 minutes  of critical care time exclusive of time spent on separate billing procedures. This includes review of laboratory data, radiology results, discussion with primary team\patient, and monitoring for potential decompensation. Interventions were performed as documented above.

## 2022-10-21 ENCOUNTER — TRANSCRIPTION ENCOUNTER (OUTPATIENT)
Age: 72
End: 2022-10-21

## 2022-10-21 DIAGNOSIS — I10 ESSENTIAL (PRIMARY) HYPERTENSION: ICD-10-CM

## 2022-10-21 DIAGNOSIS — S06.5XAA TRAUMATIC SUBDURAL HEMORRHAGE WITH LOSS OF CONSCIOUSNESS STATUS UNKNOWN, INITIAL ENCOUNTER: ICD-10-CM

## 2022-10-21 DIAGNOSIS — G12.20 MOTOR NEURON DISEASE, UNSPECIFIED: ICD-10-CM

## 2022-10-21 DIAGNOSIS — I48.91 UNSPECIFIED ATRIAL FIBRILLATION: ICD-10-CM

## 2022-10-21 DIAGNOSIS — Z79.899 OTHER LONG TERM (CURRENT) DRUG THERAPY: ICD-10-CM

## 2022-10-21 DIAGNOSIS — Z29.9 ENCOUNTER FOR PROPHYLACTIC MEASURES, UNSPECIFIED: ICD-10-CM

## 2022-10-21 LAB
ALBUMIN SERPL ELPH-MCNC: 4.2 G/DL — SIGNIFICANT CHANGE UP (ref 3.3–5)
ALP SERPL-CCNC: 104 U/L — SIGNIFICANT CHANGE UP (ref 40–120)
ALT FLD-CCNC: 11 U/L — SIGNIFICANT CHANGE UP (ref 10–45)
ANION GAP SERPL CALC-SCNC: 9 MMOL/L — SIGNIFICANT CHANGE UP (ref 5–17)
AST SERPL-CCNC: 8 U/L — LOW (ref 10–40)
BILIRUB SERPL-MCNC: 0.9 MG/DL — SIGNIFICANT CHANGE UP (ref 0.2–1.2)
BUN SERPL-MCNC: 14 MG/DL — SIGNIFICANT CHANGE UP (ref 7–23)
CALCIUM SERPL-MCNC: 9.5 MG/DL — SIGNIFICANT CHANGE UP (ref 8.4–10.5)
CHLORIDE SERPL-SCNC: 105 MMOL/L — SIGNIFICANT CHANGE UP (ref 96–108)
CO2 SERPL-SCNC: 27 MMOL/L — SIGNIFICANT CHANGE UP (ref 22–31)
CREAT SERPL-MCNC: 0.97 MG/DL — SIGNIFICANT CHANGE UP (ref 0.5–1.3)
EGFR: 84 ML/MIN/1.73M2 — SIGNIFICANT CHANGE UP
GLUCOSE SERPL-MCNC: 165 MG/DL — HIGH (ref 70–99)
HCT VFR BLD CALC: 41.9 % — SIGNIFICANT CHANGE UP (ref 39–50)
HGB BLD-MCNC: 13.7 G/DL — SIGNIFICANT CHANGE UP (ref 13–17)
MCHC RBC-ENTMCNC: 25.5 PG — LOW (ref 27–34)
MCHC RBC-ENTMCNC: 32.7 GM/DL — SIGNIFICANT CHANGE UP (ref 32–36)
MCV RBC AUTO: 78 FL — LOW (ref 80–100)
NRBC # BLD: 0 /100 WBCS — SIGNIFICANT CHANGE UP (ref 0–0)
PLATELET # BLD AUTO: 218 K/UL — SIGNIFICANT CHANGE UP (ref 150–400)
POTASSIUM SERPL-MCNC: 4.2 MMOL/L — SIGNIFICANT CHANGE UP (ref 3.5–5.3)
POTASSIUM SERPL-SCNC: 4.2 MMOL/L — SIGNIFICANT CHANGE UP (ref 3.5–5.3)
PROT SERPL-MCNC: 6.6 G/DL — SIGNIFICANT CHANGE UP (ref 6–8.3)
RBC # BLD: 5.37 M/UL — SIGNIFICANT CHANGE UP (ref 4.2–5.8)
RBC # FLD: 15.9 % — HIGH (ref 10.3–14.5)
SODIUM SERPL-SCNC: 141 MMOL/L — SIGNIFICANT CHANGE UP (ref 135–145)
WBC # BLD: 11.47 K/UL — HIGH (ref 3.8–10.5)
WBC # FLD AUTO: 11.47 K/UL — HIGH (ref 3.8–10.5)

## 2022-10-21 PROCEDURE — 99232 SBSQ HOSP IP/OBS MODERATE 35: CPT

## 2022-10-21 PROCEDURE — 99236 HOSP IP/OBS SAME DATE HI 85: CPT

## 2022-10-21 RX ORDER — MUPIROCIN 20 MG/G
1 OINTMENT TOPICAL
Qty: 0 | Refills: 0 | DISCHARGE

## 2022-10-21 RX ORDER — HYDRALAZINE HCL 50 MG
25 TABLET ORAL EVERY 8 HOURS
Refills: 0 | Status: DISCONTINUED | OUTPATIENT
Start: 2022-10-21 | End: 2022-10-22

## 2022-10-21 RX ORDER — METHYLPHENIDATE HCL 5 MG
10 TABLET ORAL
Refills: 0 | Status: DISCONTINUED | OUTPATIENT
Start: 2022-10-21 | End: 2022-10-22

## 2022-10-21 RX ORDER — BUPROPION HYDROCHLORIDE 150 MG/1
150 TABLET, EXTENDED RELEASE ORAL DAILY
Refills: 0 | Status: DISCONTINUED | OUTPATIENT
Start: 2022-10-21 | End: 2022-10-22

## 2022-10-21 RX ORDER — POLYETHYLENE GLYCOL 3350 17 G/17G
17 POWDER, FOR SOLUTION ORAL
Qty: 119 | Refills: 0
Start: 2022-10-21 | End: 2022-10-27

## 2022-10-21 RX ORDER — BETHANECHOL CHLORIDE 25 MG
10 TABLET ORAL DAILY
Refills: 0 | Status: DISCONTINUED | OUTPATIENT
Start: 2022-10-21 | End: 2022-10-22

## 2022-10-21 RX ORDER — DILTIAZEM HCL 120 MG
120 CAPSULE, EXT RELEASE 24 HR ORAL DAILY
Refills: 0 | Status: DISCONTINUED | OUTPATIENT
Start: 2022-10-21 | End: 2022-10-22

## 2022-10-21 RX ORDER — METHYLPHENIDATE HCL 5 MG
20 TABLET ORAL
Refills: 0 | Status: DISCONTINUED | OUTPATIENT
Start: 2022-10-21 | End: 2022-10-22

## 2022-10-21 RX ORDER — BACLOFEN 100 %
10 POWDER (GRAM) MISCELLANEOUS EVERY 12 HOURS
Refills: 0 | Status: DISCONTINUED | OUTPATIENT
Start: 2022-10-21 | End: 2022-10-22

## 2022-10-21 RX ORDER — TAMSULOSIN HYDROCHLORIDE 0.4 MG/1
0.4 CAPSULE ORAL AT BEDTIME
Refills: 0 | Status: DISCONTINUED | OUTPATIENT
Start: 2022-10-21 | End: 2022-10-22

## 2022-10-21 RX ORDER — METHYLPHENIDATE HCL 5 MG
2 TABLET ORAL
Qty: 0 | Refills: 0 | DISCHARGE

## 2022-10-21 RX ORDER — HYDRALAZINE HCL 50 MG
25 TABLET ORAL EVERY 8 HOURS
Refills: 0 | Status: DISCONTINUED | OUTPATIENT
Start: 2022-10-21 | End: 2022-10-21

## 2022-10-21 RX ORDER — APIXABAN 2.5 MG/1
1 TABLET, FILM COATED ORAL
Qty: 0 | Refills: 0 | DISCHARGE

## 2022-10-21 RX ORDER — ENOXAPARIN SODIUM 100 MG/ML
40 INJECTION SUBCUTANEOUS EVERY 24 HOURS
Refills: 0 | Status: DISCONTINUED | OUTPATIENT
Start: 2022-10-21 | End: 2022-10-22

## 2022-10-21 RX ADMIN — ATORVASTATIN CALCIUM 40 MILLIGRAM(S): 80 TABLET, FILM COATED ORAL at 21:22

## 2022-10-21 RX ADMIN — Medication 120 MILLIGRAM(S): at 13:55

## 2022-10-21 RX ADMIN — Medication 25 MILLIGRAM(S): at 13:55

## 2022-10-21 RX ADMIN — LOSARTAN POTASSIUM 50 MILLIGRAM(S): 100 TABLET, FILM COATED ORAL at 05:01

## 2022-10-21 RX ADMIN — POLYETHYLENE GLYCOL 3350 17 GRAM(S): 17 POWDER, FOR SOLUTION ORAL at 17:35

## 2022-10-21 RX ADMIN — GABAPENTIN 300 MILLIGRAM(S): 400 CAPSULE ORAL at 21:22

## 2022-10-21 RX ADMIN — GABAPENTIN 300 MILLIGRAM(S): 400 CAPSULE ORAL at 05:01

## 2022-10-21 RX ADMIN — BUPROPION HYDROCHLORIDE 150 MILLIGRAM(S): 150 TABLET, EXTENDED RELEASE ORAL at 13:55

## 2022-10-21 RX ADMIN — TAMSULOSIN HYDROCHLORIDE 0.4 MILLIGRAM(S): 0.4 CAPSULE ORAL at 21:24

## 2022-10-21 RX ADMIN — GABAPENTIN 300 MILLIGRAM(S): 400 CAPSULE ORAL at 13:09

## 2022-10-21 RX ADMIN — ENOXAPARIN SODIUM 40 MILLIGRAM(S): 100 INJECTION SUBCUTANEOUS at 17:33

## 2022-10-21 RX ADMIN — Medication 25 MILLIGRAM(S): at 20:20

## 2022-10-21 NOTE — PROGRESS NOTE ADULT - SUBJECTIVE AND OBJECTIVE BOX
[facility] Division of Hospital Medicine  Teodoro Michelle MD M-F, 8A-5P: MS Teams, Pager: [phone]  Other Times: Ext: [phone], Pager: [phone]      MEDICINE TRANSFER ACCEPT / CONSULT NOTE        Patient is a 72y old  Male who presents with a chief complaint of IPH (21 Oct 2022 09:22)      HPI:  (from previous notes) 72M BPH CAD, HTN, HLD, Afib on eliquis hx IPH 1yr ago, presented to [facility] with vomiting, patient takes Eliquis at home. CTH showed L IPH with parafalcine SDH.  Plt 216 PTT 42.8 INR 1.35.     Hospital Course: Patient s/p andexxa at [facility]. Repeat CT head stable. SS eval done.     Patient was examined this morning. Reports feeling well. Has pain in right knee. Denies headache, dizziness, fever, chills, vision changes, dysphagia, chest pain, dyspnea, palpitations, abdominal pain, nausea, vomiting, dysuria, hematuria, change in bowel habits. Denies weakness or numbness/tingling in extremities despite difficulty participating in physical exam of upper and lower ext.       PAST MEDICAL & SURGICAL HISTORY:  TBI (traumatic brain injury) brain bleed  HTN (hypertension)  Atrial fibrillation  Peripheral neuropathy  Major depression  HLD (hyperlipidemia)  CAD (coronary artery disease)  BPH (benign prostatic hyperplasia)  Pneumonia due to COVID-19 virus June 2022    No significant past surgical history    FAMILY HISTORY:      SOCIAL HISTORY:   Lives at home with wife who is caretaker      ALLERGIES:   No Known Allergies      COVID-19 Vaccine Information:  History of COVID-19 vaccination: Yes  Will the patient accept the PFIZER COVID-19 vaccine if eligible and it is available?: No  COVID-19 Vaccine Administered this visit:--         Home Medications:  baclofen 10 mg oral tablet: 1 tab(s) orally 2 times a day (21 Oct 2022 13:20)  bethanechol 10 mg oral tablet: 1 tab(s) orally once a day (21 Oct 2022 13:20)  buPROPion 75 mg oral tablet: 1 tab(s) orally 2 times a day (21 Oct 2022 13:20)  Eliquis 2.5 mg oral tablet: 1 tab(s) orally 2 times a day (10am, 10pm)t (21 Oct 2022 13:20)  eplerenone 25 mg oral tablet: 1 tab(s) orally once a day (21 Oct 2022 13:20)  gabapentin 300 mg oral capsule: 1 cap(s) orally 3 times a day (8am, 3pm, 10pm (21 Oct 2022 13:20)  hydrALAZINE 25 mg oral tablet: 1 tab(s) orally every 8 hours (21 Oct 2022 13:20)  latanoprost 0.005% ophthalmic solution: 1 drop(s) to each affected eye once a day (in the evening) (21 Oct 2022 13:20)  methylphenidate 10 mg oral tablet: 2 pills at 8AM; 1 pill at 1PM (21 Oct 2022 13:20)  mupirocin 2% topical ointment: Apply topically to affected area 2 times a day (21 Oct 2022 13:20)  rosuvastatin 10 mg oral tablet: 1 tab(s) orally once a day (21 Oct 2022 13:20)  tamsulosin 0.4 mg oral capsule: 1 cap(s) orally once a day (21 Oct 2022 13:20)  tiZANidine 6 mg oral capsule: 1 cap(s) orally once a day (21 Oct 2022 13:20)        MEDICATIONS  (STANDING):  atorvastatin 40 milliGRAM(s) Oral at bedtime  baclofen 10 milliGRAM(s) Oral every 12 hours  bethanechol 10 milliGRAM(s) Oral daily  buPROPion XL (24-Hour) . 150 milliGRAM(s) Oral daily  diltiazem    milliGRAM(s) Oral daily  enoxaparin Injectable 40 milliGRAM(s) SubCutaneous every 24 hours  gabapentin 300 milliGRAM(s) Oral three times a day  hydrALAZINE 25 milliGRAM(s) Oral every 8 hours  latanoprost 0.005% Ophthalmic Solution 1 Drop(s) Both EYES at bedtime  losartan 50 milliGRAM(s) Oral daily  polyethylene glycol 3350 17 Gram(s) Oral two times a day  tamsulosin 0.4 milliGRAM(s) Oral at bedtime    MEDICATIONS  (PRN):  senna 2 Tablet(s) Oral at bedtime PRN Constipation        REVIEW OF SYSTEMS:  CONSTITUTIONAL: No fever, weight loss, or fatigue  EYES: No eye pain, visual disturbances, or discharge  ENMT:  No difficulty hearing, tinnitus, vertigo; No sinus or throat pain  NECK: No pain or stiffness  RESPIRATORY: No cough, wheezing, chills or hemoptysis; No shortness of breath  CARDIOVASCULAR: No chest pain, palpitations, dizziness, or leg swelling  GASTROINTESTINAL: No abdominal or epigastric pain. No nausea, vomiting, or hematemesis; No diarrhea or constipation. No melena or hematochezia.  GENITOURINARY: No dysuria, frequency, hematuria, or incontinence  NEUROLOGICAL: No headaches, memory loss, loss of strength, numbness, or tremors  SKIN: No itching, burning, rashes, or lesions   LYMPH NODES: No enlarged glands  ENDOCRINE: No heat or cold intolerance; No hair loss  MUSCULOSKELETAL: No joint pain or swelling; No muscle, back, or extremity pain  PSYCHIATRIC: No depression, anxiety, mood swings, or difficulty sleeping  HEME/LYMPH: No easy bruising, or bleeding gums  ALLERY AND IMMUNOLOGIC: No hives or eczema        T(C): 36.7 (10-21-22 @ 12:03), Max: 37 (10-20-22 @ 15:00)  HR: 97 (10-21-22 @ 12:03) (91 - 100)  BP: 155/98 (10-21-22 @ 12:03) (141/91 - 166/88)  RR: 18 (10-21-22 @ 12:03) (18 - 20)  SpO2: 97% (10-21-22 @ 12:03) (94% - 99%)  Wt(kg): --Vital Signs Last 24 Hrs  T(C): 36.7 (21 Oct 2022 12:03), Max: 37 (20 Oct 2022 15:00)  T(F): 98.1 (21 Oct 2022 12:03), Max: 98.6 (20 Oct 2022 15:00)  HR: 97 (21 Oct 2022 12:03) (91 - 100)  BP: 155/98 (21 Oct 2022 12:03) (141/91 - 166/88)  BP(mean): 103 (20 Oct 2022 15:00) (103 - 103)  RR: 18 (21 Oct 2022 12:03) (18 - 20)  SpO2: 97% (21 Oct 2022 12:03) (94% - 99%)    Parameters below as of 21 Oct 2022 12:03  Patient On (Oxygen Delivery Method): room air    I&O's Summary    20 Oct 2022 07:01  -  21 Oct 2022 07:00  --------------------------------------------------------  IN: 1140 mL / OUT: 750 mL / NET: 390 mL    21 Oct 2022 07:01  -  21 Oct 2022 13:39  --------------------------------------------------------  IN: 240 mL / OUT: 900 mL / NET: -660 mL        PHYSICAL EXAM:  GENERAL: NAD, well-groomed, well-developed  HEAD:  Atraumatic, Normocephalic  EYES: EOMI, PERRLA, conjunctiva and sclera clear  ENMT: No tonsillar erythema, exudates, or enlargement; Moist mucous membranes,   NECK: Supple, No JVD  CHEST/LUNG: Clear to auscultation bilaterally; No rales, rhonchi, wheezing, or rubs  HEART: Regular rate and rhythm; No murmurs, rubs, or gallops  ABDOMEN: Soft, Nontender, Nondistended; Bowel sounds present  EXTREMITIES:  2+ Peripheral Pulses, No clubbing, cyanosis, or edema  NERVOUS SYSTEM: L facial asymmetry, limited ROM at shoulder BL, hip BL, R elbow, and right knee. reduced strength in upper and lower extremities   PSYCH:  A+O x 2, flat affect  LYMPH: No lymphadenopathy noted  SKIN: No rashes or lesions      LABS:  CBC Full  -  ( 21 Oct 2022 10:03 )  WBC Count : 11.47 K/uL  Hemoglobin : 13.7 g/dL  Hematocrit : 41.9 %  Platelet Count - Automated : 218 K/uL  Mean Cell Volume : 78.0 fl  Mean Cell Hemoglobin : 25.5 pg  Mean Cell Hemoglobin Concentration : 32.7 gm/dL  Auto Neutrophil # : x  Auto Lymphocyte # : x  Auto Monocyte # : x  Auto Eosinophil # : x  Auto Basophil # : x  Auto Neutrophil % : x  Auto Lymphocyte % : x  Auto Monocyte % : x  Auto Eosinophil % : x  Auto Basophil % : x    10-21    141  |  105  |  14  ----------------------------<  165<H>  4.2   |  27  |  0.97    Ca    9.5      21 Oct 2022 10:03  Phos  3.0     10-20  Mg     1.9     10-20    TPro  6.6  /  Alb  4.2  /  TBili  0.9  /  DBili  x   /  AST  8<L>  /  ALT  11  /  AlkPhos  104  10-21    LIVER FUNCTIONS - ( 21 Oct 2022 10:03 )  Alb: 4.2 g/dL / Pro: 6.6 g/dL / ALK PHOS: 104 U/L / ALT: 11 U/L / AST: 8 U/L / GGT: x             PT/INR - ( 19 Oct 2022 15:19 )   PT: 16.6 sec;   INR: 1.43 ratio         PTT - ( 19 Oct 2022 15:19 )  PTT:36.3 sec    COVID-19 PCR: NotDetec (19 Oct 2022 13:15)  SARS-CoV-2: Detected (30 Jun 2022 22:20)  SARS-CoV-2: Detected (14 Jun 2022 11:19)  COVID-19 PCR: Detected (14 Jun 2022 11:03)        EKG:       RADIOLOGY & ADDITIONAL TESTS:    Imaging Personally Reviewed:  [ ] YES  [ ] NO  Care Discussed with Consultants/Other Providers [Y/N]:  Consultant(s) Notes Reviewed:  [x ] YES  [ ] NO  Care Discussed with Consultants/Other Providers [ x] YES  [ ] NO

## 2022-10-21 NOTE — DISCHARGE NOTE PROVIDER - NSDCCPCAREPLAN_GEN_ALL_CORE_FT
PRINCIPAL DISCHARGE DIAGNOSIS  Diagnosis: Subdural hematoma  Assessment and Plan of Treatment: CT Head stable  Discontinue Eliquis      SECONDARY DISCHARGE DIAGNOSES  Diagnosis: Chronic atrial fibrillation  Assessment and Plan of Treatment: Discontinue Eliquis and continue all other home medications.  Patient to follow up cardiology outpatient for re-eval for watchman when stable.    Diagnosis: Hypertension  Assessment and Plan of Treatment: Continue home medications: Cardizem, hydralazine and losartan.    Diagnosis: Neuropathy  Assessment and Plan of Treatment:   Continue home medication gabapentin.     PRINCIPAL DISCHARGE DIAGNOSIS  Diagnosis: Subdural hematoma  Assessment and Plan of Treatment: CT Head stable for subdural hematoma and not noted to be worsening upon discharge.   Discontinue Eliquis. Follow up with your PCP for further evaluation and management.      SECONDARY DISCHARGE DIAGNOSES  Diagnosis: Chronic atrial fibrillation  Assessment and Plan of Treatment: Discontinue Eliquis and continue all other home medications.  Patient to follow up cardiology outpatient for re-eval for watchman procedure when stable.    Diagnosis: Hypertension  Assessment and Plan of Treatment: Continue home medications: Cardizem, hydralazine and losartan. Recommended to have a low salt/DASH diet. Follow up with PCP for management of hypertension.    Diagnosis: Neuropathy  Assessment and Plan of Treatment: Continue home medication gabapentin.    Diagnosis: BPH (benign prostatic hyperplasia)  Assessment and Plan of Treatment: Continue home med of tamsulosin.

## 2022-10-21 NOTE — DISCHARGE NOTE PROVIDER - EXTENDED VTE YES NO FOR MLM ENOXAPARIN
PATIENT INFORMATION    Anticipatory guidance discussed  Importance of regular dental care  Importance of regular exercise  Importance of varied diet  Minimize junk food  Puberty  Seat belts  Sex; STD and pregnancy prevention    Follow-Up  - Return in 1 year for your yearly well visit.    13-17 years old Health and Safety Tips - The following hyperlinks are available to access via [employer]    Parent Education from [employer]    Educación para padres sobre niños sanos    Additional Educational Resources:  For additional resources regarding your symptoms, diagnosis, or further health information, please visit the Discover a Healthier You section on [email or web address]/ or the Online Health Resources section in [employer].  Patient Education     Well-Child Checkup: 14 to 18 Years     Stay involved in your teen’s life. Make sure your teen knows you’re always there when he or she needs to talk.   During the teen years, it’s important to keep having yearly checkups. Your teen may be embarrassed about having a checkup. Reassure your teen that the exam is normal and necessary. Be aware that the healthcare provider may ask to talk with your child without you in the exam room.   School and social issues  Here are some topics you, your teen, and the healthcare provider may want to discuss during this visit:   · School performance. How is your child doing in school? Is homework finished on time? Does your child stay organized? These are skills you can help with. Keep in mind that a drop in school performance can be a sign of other problems.  · Friendships. Do you like your child’s friends? Do the friendships seem healthy? Make sure to talk to your teen about who his or her friends are and how they spend time together. Peer pressure can be a problem among teenagers.  · Life at home. How is your child’s behavior? Does he or she get along with others in the family? Is he or she respectful of you, other adults, and  authority? Does your child participate in family events, or does he or she withdraw from other family members?  · Risky behaviors. Many teenagers are curious about drugs, alcohol, smoking, and sex. Talk openly about these issues. Answer your child’s questions, and don’t be afraid to ask questions of your own. If you’re not sure how to approach these topics, talk to the healthcare provider for advice.   Puberty  Your teen may still be experiencing some of the changes of puberty, such as:  · Acne and body odor. Hormones that increase during puberty can cause acne (pimples) on the face and body. Hormones can also increase sweating and cause a stronger body odor.  · Body changes. The body grows and matures during puberty. Hair will grow in the pubic area and on other parts of the body. Girls grow breasts and menstruate (have monthly periods). A boy’s voice changes, becoming lower and deeper. As the penis matures, erections and wet dreams will start to happen. Talk to your teen about what to expect, and help him or her deal with these changes when possible.  · Emotional changes. Along with these physical changes, you’ll likely notice changes in your teen’s personality. He or she may develop an interest in dating and becoming “more than friends” with other kids. Also, it’s normal for your teen to be stone. Try to be patient and consistent. Encourage conversations, even when he or she doesn’t seem to want to talk. No matter how your teen acts, he or she still needs a parent.  Nutrition and exercise tips  Your teenager likely makes his or her own decisions about what to eat and how to spend free time. You can’t always have the final say, but you can encourage healthy habits. Your teen should:   · Get at least 30 to 60 minutes of physical activity every day. This time can be broken up throughout the day. After-school sports, dance or martial arts classes, riding a bike, or even walking to school or a friend’s house counts as  activity.    · Limit “screen time” to 1 hour each day. This includes time spent watching TV, playing video games, using the computer, and texting. If your teen has a TV, computer, or video game console in the bedroom, consider replacing it with a music player.   · Eat healthy. Your child should eat fruits, vegetables, lean meats, and whole grains every day. Less healthy foods--like french fries, candy, and chips--should be eaten rarely. Some teens fall into the trap of snacking on junk food and fast food throughout the day. Make sure the kitchen is stocked with healthy choices for after-school snacks. If your teen does choose to eat junk food, consider making him or her buy it with his or her own money.   · Eat 3 meals a day. Many kids skip breakfast and even lunch. Not only is this unhealthy, it can also hurt school performance. Make sure your teen eats breakfast. If your teen does not like the food served at school for lunch, allow him or her to prepare a bag lunch.  · Have at least one family meal with you each day. Busy schedules often limit time for sitting and talking. Sitting and eating together allows for family time. It also lets you see what and how your child eats.   · Limit soda and juice drinks. A small soda is OK once in a while. But soda, sports drinks, and juice drinks are no substitute for healthier drinks. Sports and juice drinks are no better. Water and low-fat or nonfat milk are the best choices.  Hygiene tips  Recommendations for good hygiene include the following:   · Teenagers should bathe or shower daily and use deodorant.  · Let the healthcare provider know if you or your teen have questions about hygiene or acne.  · Bring your teen to the dentist at least twice a year for teeth cleaning and a checkup.  · Remind your teen to brush and floss his or her teeth before bed.  Sleeping tips  During the teen years, sleep patterns may change. Many teenagers have a hard time falling asleep. This can  lead to sleeping late the next morning. Here are some tips to help your teen get the rest he or she needs:   · Encourage your teen to keep a consistent bedtime, even on weekends. Sleeping is easier when the body follows a routine. Don’t let your teen stay up too late at night or sleep in too long in the morning.  · Help your teen wake up, if needed. Go into the bedroom, open the blinds, and get your teen out of bed--even on weekends or during school vacations.  · Being active during the day will help your child sleep better at night.  · Discourage use of the TV, computer, or video games for at least an hour before your teen goes to bed. (This is good advice for parents, too!)  · Make a rule that cell phones must be turned off at night.  Safety tips  Recommendations to keep your teen safe include the following:  · Set rules for how your teen can spend time outside of the house. Give your child a nighttime curfew. If your child has a cell phone, check in periodically by calling to ask where he or she is and what he or she is doing.  · Make sure cell phones and portable music players are used safely and responsibly. Help your teen understand that it is dangerous to talk on the phone, text, or listen to music with headphones while he or she is riding a bike or walking outdoors, especially when crossing the street.  · Constant loud music can cause hearing damage, so monitor your teen’s music volume. Many music players let you set a limit for how loud the volume can be turned up. Check the directions for details.  · When your teen is old enough for a ’s license, encourage safe driving. Teach your teen to always wear a seat belt, drive the speed limit, and follow the rules of the road. Do not allow your teenager to text or talk on a cell phone while driving. (And don’t do this yourself! Remember, you set an example.)  · Set rules and limits around driving and use of the car. If your teen gets a ticket or has an  accident, there should be consequences. Driving is a privilege that can be taken away if your child doesn’t follow the rules.  · Teach your child to make good decisions about drugs, alcohol, sex, and other risky behaviors. Work together to come up with strategies for staying safe and dealing with peer pressure. Make sure your teenager knows he or she can always come to you for help.  Tests and vaccines  If you have a strong family history of high cholesterol, your teen’s blood cholesterol may be tested at this visit. Based on recommendations from the CDC, at this visit your child may receive the following vaccines:   · Meningococcal  · Influenza (flu), annually  Recognizing signs of depression  It’s normal for teenagers to have extreme mood swings as a result of their changing hormones. It’s also just a part of growing up. But sometimes a teenager’s mood swings are signs of a larger problem. If your teen seems depressed for more than 2 weeks, you should be concerned. Signs of depression include:   · Use of drugs or alcohol  · Problems in school and at home  · Frequent episodes of running away  · Thoughts or talk of death or suicide  · Withdrawal from family and friends  · Sudden changes in eating or sleeping habits  · Sexual promiscuity or unplanned pregnancy  · Hostile behavior or rage  · Loss of pleasure in life  Depressed teens can be helped with treatment. Talk to your child’s healthcare provider. Or check with your local mental health center, social service agency, or hospital. Assure your teen that his or her pain can be eased. Offer your love and support. If your teen talks about death or suicide, seek help right away.   [employer] last reviewed this educational content on 4/1/2020 © 2000-2020 [employer]. All rights reserved. This information is not intended as a substitute for professional medical care. Always follow your healthcare professional's instructions.            ,

## 2022-10-21 NOTE — DISCHARGE NOTE PROVIDER - ATTENDING DISCHARGE PHYSICAL EXAMINATION:
Patient seen and examined. No acute events overnight. Patient states he is doing well. Denies any new complaints or concerns at this time.   Vital Signs Last 24 Hrs  T(C): 36.4 (22 Oct 2022 09:40), Max: 37.3 (21 Oct 2022 17:30)  T(F): 97.6 (22 Oct 2022 09:40), Max: 99.1 (21 Oct 2022 17:30)  HR: 76 (22 Oct 2022 09:40) (76 - 94)  BP: 129/78 (22 Oct 2022 09:40) (129/78 - 172/66)  RR: 18 (22 Oct 2022 09:40) (16 - 18)  SpO2: 93% (22 Oct 2022 09:40) (93% - 97%)    Parameters below as of 22 Oct 2022 09:40  Patient On (Oxygen Delivery Method): room air    CONSTITUTIONAL: NAD, well-developed   EYES: PERRLA; conjunctiva and sclera clear  ENMT: Moist oral mucosa, no pharyngeal injection or exudates   NECK: Supple   RESPIRATORY: Normal respiratory effort; lungs are clear to auscultation bilaterally  CARDIOVASCULAR: Regular rate and rhythm, normal S1 and S2, no murmur   ABDOMEN: Nontender to palpation, normoactive bowel sounds   MUSCULOSKELETAL: no clubbing or cyanosis of digits; no joint swelling or tenderness to palpation  PSYCH: affect appropriate  NEUROLOGY: no gross sensory deficits   SKIN: No rashes

## 2022-10-21 NOTE — DISCHARGE NOTE NURSING/CASE MANAGEMENT/SOCIAL WORK - NSDCPEFALRISK_GEN_ALL_CORE
For information on Fall & Injury Prevention, visit: [email or web address] OR  [email or web address] OR  https://www.cdc.gov/steadi/patient.html

## 2022-10-21 NOTE — DISCHARGE NOTE PROVIDER - POSTFACE STATEMENT FOR MINUTES SPENT
Left message for patient to call us back. Needs to be informed of the advised.  
Patient calling to give Dr Nash an update on how busPIRone (BUSPAR) 7.5 MG tablet is working for her.    Patient states \"I think its working, I feel a little bit better, but wondering if I could get a little higher dosage\".    Patient states she still has anxiety but she believes the medication has improved it a little. She states \"its hard to tell\".     Call patient at [phone]  
Patient was informed of the advised and script was sent.  
Please call  Increase dose to Buspar 15 mg, 1 po BID, #180, 1 refill  Have her call back in 4-6 weeks  
Please see update from patient  
minutes on the discharge service.

## 2022-10-21 NOTE — PROGRESS NOTE ADULT - PROBLEM SELECTOR PLAN 6
DVT PPX: Lovenox subcu - ok to start per neuro sx team    Discharge planning 40mins  To home with home PT, speech therapy   Patient's wife is caretaker DVT PPX: Lovenox subcu - ok to start per neuro sx team    Discharge planning 40mins  To home with outpatient PT, speech therapy, HHA  Patient's wife is caretaker

## 2022-10-21 NOTE — DISCHARGE NOTE PROVIDER - CARE PROVIDER_API CALL
Richard Mathis)  Gastroenterology; Internal Medicine  [address]  [place] [address]  Phone: [phone]  Fax: [phone]  Established Patient  Follow Up Time: 1 week    Mirta Goldsmith)  Internal Medicine  [address] [place] [address]  Phone: [phone]  Fax: [phone]  Scheduled Appointment: 11/11/2022 11:30 PM   Richard Mathis)  Gastroenterology; Internal Medicine  [address]  [place] [address]  Phone: [phone]  Fax: [phone]  Established Patient  Follow Up Time: 1 week    Mirta Goldsmith)  Internal Medicine  [address] [place] [address]  Phone: [phone]  Fax: [phone]  Scheduled Appointment: 11/11/2022 11:30 PM    Nigel Joe)  Neurology; Neurophysiology  [address]  [place] [address]  Phone: [phone]  Fax: [phone]  Follow Up Time: 1 week

## 2022-10-21 NOTE — DISCHARGE NOTE PROVIDER - PROVIDER TOKENS
PROVIDER:[TOKEN:[[id number]],FOLLOWUP:[1 week],ESTABLISHEDPATIENT:[T]],PROVIDER:[TOKEN:[[id number]],SCHEDULEDAPPT:[11/11/2022],SCHEDULEDAPPTTIME:[11:30 PM]] PROVIDER:[TOKEN:[[id number]],FOLLOWUP:[1 week],ESTABLISHEDPATIENT:[T]],PROVIDER:[TOKEN:[[id number]],SCHEDULEDAPPT:[11/11/2022],SCHEDULEDAPPTTIME:[11:30 PM]],PROVIDER:[TOKEN:[[id number]],FOLLOWUP:[1 week]]

## 2022-10-21 NOTE — CHART NOTE - NSCHARTNOTEFT_GEN_A_CORE
72M BPH CAD, HTN, HLD, Afib on eliquis hx IPH 1yr ago, presented to [facility] with vomiting, patient takes Eliquis at home. CTH showed L IPH with parafalcine SDH.  Initial swallowing evaluation at b/s recommended minced and moist with thin liquids with cognitive linguistic deficits noted.    Upon encounter, Pt seated upright in bed and and he answered simple self centered questions; Pt pleasant.  Oral exam was unremarkable.  Pt provided with easy to chew texture and thin liquids via straw..  Formation, mastication, control and transfer of bolus deemed to be WFL.  Mastication was mildly prolonged therefore hard solids not presented. Pt self administered small bites of cookie although assistance with PO intake required.   No signs or symptoms of laryngeal penetration/aspiration evident with any consistency presented.  Pharyngeal swallow judged to be timely with adequate laryngeal elevation palpated. Clear vocal quality noted post swallow.  Pt presents with overtly functional oropharyngeal swallowing skills with easy to chew and thin liquid textures.  Recommend: Easy to chew with thin liquids with feeding assistance and tray set up to cup up food; Maintain aspiration precautions. Pt also presents with cognitive linguistic deficits therefore consider Outpt speech language therapy if skills not deemed to be at baseline.  Sarahi Weiner MS CCC-SLP   Pgr # [phone]

## 2022-10-21 NOTE — DISCHARGE NOTE PROVIDER - NSDCFUADDAPPT_GEN_ALL_CORE_FT
APPTS ARE READY TO BE MADE: [x] YES    Best Family or Patient Contact (if needed):    Additional Information about above appointments (if needed):    1:   2:   3:     Other comments or requests:    APPTS ARE READY TO BE MADE: [x] YES    Best Family or Patient Contact (if needed):    Additional Information about above appointments (if needed):    1:   2:   3:     Other comments or requests:   Patient was provided with follow up request details and was advised to call to schedule follow up within specified time frame.

## 2022-10-21 NOTE — PROGRESS NOTE ADULT - SUBJECTIVE AND OBJECTIVE BOX
[facility] - Alexey Briceno Pannella, Patel, Savella  Office Number:  [phone]    Patient resting comfortably in bed in NAD.  Laying flat with no respiratory distress.  No complaints of chest pain, dyspnea, palpitations, PND, or orthopnea.    ROS: negative unless otherwise mentioned.    Telemetry:  off    MEDICATIONS  (STANDING):  atorvastatin 40 milliGRAM(s) Oral at bedtime  gabapentin 300 milliGRAM(s) Oral three times a day  latanoprost 0.005% Ophthalmic Solution 1 Drop(s) Both EYES at bedtime  losartan 50 milliGRAM(s) Oral daily  polyethylene glycol 3350 17 Gram(s) Oral two times a day    MEDICATIONS  (PRN):  senna 2 Tablet(s) Oral at bedtime PRN Constipation      Allergies    No Known Allergies    Intolerances        Vital Signs Last 24 Hrs  T(C): 36.8 (21 Oct 2022 09:00), Max: 37 (20 Oct 2022 15:00)  T(F): 98.2 (21 Oct 2022 09:00), Max: 98.6 (20 Oct 2022 15:00)  HR: 96 (21 Oct 2022 09:00) (91 - 113)  BP: 156/96 (21 Oct 2022 09:00) (141/91 - 166/88)  BP(mean): 103 (20 Oct 2022 15:00) (103 - 126)  RR: 18 (21 Oct 2022 09:00) (12 - 20)  SpO2: 98% (21 Oct 2022 09:00) (94% - 99%)    Parameters below as of 21 Oct 2022 09:00  Patient On (Oxygen Delivery Method): room air        I&O's Summary    20 Oct 2022 07:01  -  21 Oct 2022 07:00  --------------------------------------------------------  IN: 1140 mL / OUT: 750 mL / NET: 390 mL        ON EXAM:  Constitutional: NAD, awake    HEENT: Moist Mucous Membranes, Anicteric  Pulmonary: Decreased breath sounds b/l. No rales, crackles or wheeze appreciated.   Cardiovascular: IRRR, S1 and S2, No murmurs, rubs, gallops or clicks  Gastrointestinal: Bowel Sounds present, soft, nontender.   Lymph: No peripheral edema. No lymphadenopathy.  Skin: No visible rashes or ulcers.  Psych:  flat affect normal mood     LABS: All Labs Reviewed:                        14.8   14.33 )-----------( 196      ( 20 Oct 2022 02:22 )             45.8                         14.6   13.56 )-----------( 230      ( 19 Oct 2022 15:19 )             45.1                         14.7   14.43 )-----------( 216      ( 19 Oct 2022 12:27 )             44.6     20 Oct 2022 03:22    140    |  104    |  12     ----------------------------<  190    3.4     |  23     |  0.78   20 Oct 2022 02:22    138    |  103    |  11     ----------------------------<  183    5.8     |  21     |  0.71   19 Oct 2022 15:19    139    |  102    |  11     ----------------------------<  179    3.8     |  26     |  0.81     Ca    9.4        20 Oct 2022 03:22  Ca    9.5        20 Oct 2022 02:22  Ca    9.3        19 Oct 2022 15:19  Phos  3.0       20 Oct 2022 03:22  Phos  3.4       20 Oct 2022 02:22  Mg     1.9       20 Oct 2022 03:22  Mg     2.1       20 Oct 2022 02:22    TPro  6.6    /  Alb  4.1    /  TBili  0.8    /  DBili  x      /  AST  8      /  ALT  11     /  AlkPhos  108    19 Oct 2022 15:19  TPro  7.4    /  Alb  3.8    /  TBili  0.9    /  DBili  x      /  AST  7      /  ALT  20     /  AlkPhos  119    19 Oct 2022 12:27    PT/INR - ( 19 Oct 2022 15:19 )   PT: 16.6 sec;   INR: 1.43 ratio         PTT - ( 19 Oct 2022 15:19 )  PTT:36.3 sec  CARDIAC MARKERS ( 19 Oct 2022 12:27 )  x     / x     / x     / x     / 1.8 ng/mL      Blood Culture:     10-20 @ 02:22  TSH: 0.85

## 2022-10-21 NOTE — DISCHARGE NOTE PROVIDER - HOSPITAL COURSE
72M BPH CAD, HTN, HLD, Afib on eliquis hx IPH 1yr ago, presented to [facility] with vomiting, patient takes Eliquis at home. CTH showed L IPH with parafalcine SDH.  Plt 216 PTT 42.8 INR 1.35. Patient s/p andexxa at [facility].     Problem: SDH (subdural hematoma).   ·  Plan: Patient s/p andexxa at [facility]  Discussed with neurosurgery team  Repeat CT head stable  Ok to resume home meds (except eliquis) per neurosurgery team  SS eval done - rec for Soft bite sized diet. 1:1 assist with meals. Strict aspiration precautions  PT eval done - rec home PT  Discharge planning.    ·  Problem: Afib.   ·  Plan: #Chronic Afib  Resumed home med CardizemCD  NO AC. DISCONTINUE Eliquis on DC med recs  Tele  Patient to follow up cardiology outpatient for re-eval for watchman when stable.    ·  Problem: Hypertension.   ·  Plan: Resume home meds Hydralazine, Losartan with hold parameters  Monitor BP.    ·  Problem: Motor system disease.   ·  Plan: Continue home med Baclofen    #Neuropathy  Continue home med Gabapentin    #Mood disorder  Resume home med Wellbutrin    #BPH  Resume home med Tamsulosin.   72M BPH CAD, HTN, HLD, Afib on eliquis hx IPH 1yr ago, presented to [facility] with vomiting, patient takes Eliquis at home. CTH showed L IPH with parafalcine SDH.  Plt 216 PTT 42.8 INR 1.35. Patient s/p andexxa at [facility].     Problem: SDH (subdural hematoma).   ·  Plan: Patient s/p andexxa at [facility]  Discussed with neurosurgery team  Repeat CT head stable  Ok to resume home meds (except eliquis) per neurosurgery team  SS eval done - rec for Soft bite sized diet. 1:1 assist with meals. Strict aspiration precautions  PT eval done - rec home PT  Discharge planning.    ·  Problem: Afib.   ·  Plan: #Chronic Afib  Resumed home med CardizemCD  NO AC. DISCONTINUE Eliquis on DC med recs  Tele  Patient to follow up cardiology outpatient for re-eval for watchman when stable.    ·  Problem: Hypertension.   ·  Plan: Resume home meds Hydralazine, Losartan with hold parameters  Monitor BP.    ·  Problem: Motor system disease.   ·  Plan: Continue home med Baclofen    #Neuropathy  Continue home med Gabapentin    #Mood disorder  Resume home med Wellbutrin    #BPH  Resume home med Tamsulosin.      Discharge diagnosis:  1. SDH requiring Eliquis anticoagulation reversal   2. Chronic Afib  3. Hypertension   72M BPH CAD, HTN, HLD, Afib on eliquis hx IPH 1yr ago, presented to [facility] with vomiting, patient takes Eliquis at home. CTH showed L IPH with parafalcine SDH.  Plt 216 PTT 42.8 INR 1.35. Patient s/p andexxa at [facility].     Problem: SDH (subdural hematoma).   ·  Plan: Patient s/p andexxa at [facility]  Discussed with neurosurgery team  Repeat CT head stable  Ok to resume home meds (except eliquis) per neurosurgery team  SS eval done - rec for Soft bite sized diet. 1:1 assist with meals. Strict aspiration precautions  PT eval done - rec home PT  Discharge planning.    ·  Problem: Afib.   ·  Plan: #Chronic Afib  Resumed home med CardizemCD  NO AC. DISCONTINUE Eliquis on DC med recs.  Tele  Patient to follow up cardiology outpatient for re-eval for watchman when stable.    ·  Problem: Hypertension.   ·  Plan: Resume home meds Hydralazine, Losartan with hold parameters  Monitor BP.    ·  Problem: Motor system disease.   ·  Plan: Continue home med Baclofen    #Neuropathy  Continue home med Gabapentin    #Mood disorder  Resume home med Wellbutrin    #BPH  Resume home med Tamsulosin.      Discharge diagnosis:  1. SDH requiring Eliquis anticoagulation reversal   2. Chronic Afib  3. Hypertension

## 2022-10-21 NOTE — PROGRESS NOTE ADULT - ASSESSMENT
72 y.o. 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    - No signs of significant ischemia or volume overload.   - trops neg  - ekg is nonischemic    - Now with recurrent SDH.   - will need to stop Eliquis for good  - at this point would not rechallenge with AC  - antiplatelets when able  - will have to reeval for watchman when stable.     - Af is rate controlled  - echo with normal LV function  - resume cardizem 30mg q6 if possible    - HTN has recently been very labile  - cont losartan 50  - was getting hydralazine as well.     - Monitor and replete electrolytes. Keep K>4.0 and Mg>2.0.   - Further cardiac workup will depend on clinical course.

## 2022-10-21 NOTE — PROGRESS NOTE ADULT - PROBLEM SELECTOR PLAN 2
#Chronic Afib  [facility]  [facility]. DISCONTINUE Eliquis on DC med recs  Tele  Patient to follow up cardiology outpatient for re-eval for watchman when stable

## 2022-10-21 NOTE — DISCHARGE NOTE PROVIDER - NSDCMRMEDTOKEN_GEN_ALL_CORE_FT
baclofen 10 mg oral tablet: 1 tab(s) orally 2 times a day  bethanechol 10 mg oral tablet: 1 tab(s) orally once a day  buPROPion 75 mg oral tablet: 1 tab(s) orally 2 times a day  Cardizem  mg/24 hours oral capsule, extended release: 1 cap(s) orally once a day   Eliquis 2.5 mg oral tablet: 1 tab(s) orally 2 times a day (10am, 10pm)t  eplerenone 25 mg oral tablet: 1 tab(s) orally once a day  gabapentin 300 mg oral capsule: 1 cap(s) orally 3 times a day (8am, 3pm, 10pm  hydrALAZINE 25 mg oral tablet: 1 tab(s) orally every 8 hours  latanoprost 0.005% ophthalmic solution: 1 drop(s) to each affected eye once a day (in the evening)  losartan 25 mg oral tablet: 1 tab(s) orally once a day   methylphenidate 10 mg oral tablet: 2 pills at 8AM; 1 pill at 1PM  mupirocin 2% topical ointment: Apply topically to affected area 2 times a day  rosuvastatin 10 mg oral tablet: 1 tab(s) orally once a day  tamsulosin 0.4 mg oral capsule: 1 cap(s) orally once a day  tiZANidine 6 mg oral capsule: 1 cap(s) orally once a day   baclofen 10 mg oral tablet: 1 tab(s) orally 2 times a day  bethanechol 10 mg oral tablet: 1 tab(s) orally once a day  buPROPion 75 mg oral tablet: 1 tab(s) orally 2 times a day  Cardizem  mg/24 hours oral capsule, extended release: 1 cap(s) orally once a day   eplerenone 25 mg oral tablet: 1 tab(s) orally once a day  gabapentin 300 mg oral capsule: 1 cap(s) orally 3 times a day (8am, 3pm, 10pm  hydrALAZINE 25 mg oral tablet: 1 tab(s) orally every 8 hours  latanoprost 0.005% ophthalmic solution: 1 drop(s) to each affected eye once a day (in the evening)  losartan 25 mg oral tablet: 1 tab(s) orally once a day   methylphenidate 10 mg oral tablet: 2 pills at 8AM; 1 pill at 1PM  polyethylene glycol 3350 oral powder for reconstitution: 17 gram(s) orally once a day, As Needed MDD:Hold for diarrhea   rosuvastatin 10 mg oral tablet: 1 tab(s) orally once a day  tamsulosin 0.4 mg oral capsule: 1 cap(s) orally once a day  tiZANidine 6 mg oral capsule: 1 cap(s) orally once a day   baclofen 10 mg oral tablet: 1 tab(s) orally 2 times a day  bethanechol 10 mg oral tablet: 1 tab(s) orally once a day  buPROPion 75 mg oral tablet: 1 tab(s) orally 2 times a day  Cardizem  mg/24 hours oral capsule, extended release: 1 cap(s) orally once a day   eplerenone 25 mg oral tablet: 1 tab(s) orally once a day  gabapentin 300 mg oral capsule: 1 cap(s) orally 3 times a day (8am, 3pm, 10pm  hydrALAZINE 25 mg oral tablet: 1 tab(s) orally every 8 hours  latanoprost 0.005% ophthalmic solution: 1 drop(s) to each affected eye once a day (in the evening)  losartan 25 mg oral tablet: 1 tab(s) orally once a day   methylphenidate 10 mg oral tablet: 2 pills at 8AM; 1 pill at 1PM  Physical Therapy:   polyethylene glycol 3350 oral powder for reconstitution: 17 gram(s) orally once a day, As Needed MDD:Hold for diarrhea   rosuvastatin 10 mg oral tablet: 1 tab(s) orally once a day  Speech Therapy:   tamsulosin 0.4 mg oral capsule: 1 cap(s) orally once a day

## 2022-10-21 NOTE — CHART NOTE - NSCHARTNOTESELECT_GEN_ALL_CORE
Discharge instructions reviewed with pt and family member who verbalize understanding of follow up care.
Speech and Swallow
VTE Risk Assessment/Event Note

## 2022-10-21 NOTE — DISCHARGE NOTE NURSING/CASE MANAGEMENT/SOCIAL WORK - PATIENT PORTAL LINK FT
You can access the FollowMyHealth Patient Portal offered by [facility] by registering at the following website: [email or web address]. By joining [facility]’s FollowMyHealth portal, you will also be able to view your health information using other applications (apps) compatible with our system.

## 2022-10-21 NOTE — DISCHARGE NOTE PROVIDER - NSDCFUADDINST_GEN_ALL_CORE_FT
.      DC home with outpatient PT, Speech/Swallow therapy    Close outpatient follow up with PCP and cardiology. Patient to follow up cardiology outpatient for re-eval for watchman when stable.      .

## 2022-10-21 NOTE — PROGRESS NOTE ADULT - ASSESSMENT
72M BPH CAD, HTN, HLD, Afib on eliquis hx IPH 1yr ago, presented to [facility] with vomiting, patient takes Eliquis at home. CTH showed L IPH with parafalcine SDH.  Plt 216 PTT 42.8 INR 1.35. Patient s/p andexxa at [facility].

## 2022-10-21 NOTE — DISCHARGE NOTE PROVIDER - CARE PROVIDERS DIRECT ADDRESSES
[email or web address]

## 2022-10-22 VITALS
OXYGEN SATURATION: 97 % | TEMPERATURE: 98 F | DIASTOLIC BLOOD PRESSURE: 98 MMHG | HEART RATE: 84 BPM | SYSTOLIC BLOOD PRESSURE: 163 MMHG | RESPIRATION RATE: 18 BRPM

## 2022-10-22 LAB
ANION GAP SERPL CALC-SCNC: 11 MMOL/L — SIGNIFICANT CHANGE UP (ref 5–17)
BUN SERPL-MCNC: 11 MG/DL — SIGNIFICANT CHANGE UP (ref 7–23)
CALCIUM SERPL-MCNC: 8.9 MG/DL — SIGNIFICANT CHANGE UP (ref 8.4–10.5)
CHLORIDE SERPL-SCNC: 101 MMOL/L — SIGNIFICANT CHANGE UP (ref 96–108)
CO2 SERPL-SCNC: 24 MMOL/L — SIGNIFICANT CHANGE UP (ref 22–31)
CREAT SERPL-MCNC: 0.88 MG/DL — SIGNIFICANT CHANGE UP (ref 0.5–1.3)
EGFR: 93 ML/MIN/1.73M2 — SIGNIFICANT CHANGE UP
GLUCOSE SERPL-MCNC: 177 MG/DL — HIGH (ref 70–99)
HCT VFR BLD CALC: 40.2 % — SIGNIFICANT CHANGE UP (ref 39–50)
HGB BLD-MCNC: 13.5 G/DL — SIGNIFICANT CHANGE UP (ref 13–17)
MCHC RBC-ENTMCNC: 25.9 PG — LOW (ref 27–34)
MCHC RBC-ENTMCNC: 33.6 GM/DL — SIGNIFICANT CHANGE UP (ref 32–36)
MCV RBC AUTO: 77.2 FL — LOW (ref 80–100)
NRBC # BLD: 0 /100 WBCS — SIGNIFICANT CHANGE UP (ref 0–0)
PLATELET # BLD AUTO: 202 K/UL — SIGNIFICANT CHANGE UP (ref 150–400)
POTASSIUM SERPL-MCNC: 3.8 MMOL/L — SIGNIFICANT CHANGE UP (ref 3.5–5.3)
POTASSIUM SERPL-SCNC: 3.8 MMOL/L — SIGNIFICANT CHANGE UP (ref 3.5–5.3)
RBC # BLD: 5.21 M/UL — SIGNIFICANT CHANGE UP (ref 4.2–5.8)
RBC # FLD: 15.7 % — HIGH (ref 10.3–14.5)
SODIUM SERPL-SCNC: 136 MMOL/L — SIGNIFICANT CHANGE UP (ref 135–145)
WBC # BLD: 12.34 K/UL — HIGH (ref 3.8–10.5)
WBC # FLD AUTO: 12.34 K/UL — HIGH (ref 3.8–10.5)

## 2022-10-22 PROCEDURE — 96374 THER/PROPH/DIAG INJ IV PUSH: CPT

## 2022-10-22 PROCEDURE — 93970 EXTREMITY STUDY: CPT

## 2022-10-22 PROCEDURE — 92526 ORAL FUNCTION THERAPY: CPT

## 2022-10-22 PROCEDURE — 97161 PT EVAL LOW COMPLEX 20 MIN: CPT

## 2022-10-22 PROCEDURE — 83036 HEMOGLOBIN GLYCOSYLATED A1C: CPT

## 2022-10-22 PROCEDURE — 86901 BLOOD TYPING SEROLOGIC RH(D): CPT

## 2022-10-22 PROCEDURE — 97166 OT EVAL MOD COMPLEX 45 MIN: CPT

## 2022-10-22 PROCEDURE — 85027 COMPLETE CBC AUTOMATED: CPT

## 2022-10-22 PROCEDURE — 85025 COMPLETE CBC W/AUTO DIFF WBC: CPT

## 2022-10-22 PROCEDURE — C8929: CPT

## 2022-10-22 PROCEDURE — 83735 ASSAY OF MAGNESIUM: CPT

## 2022-10-22 PROCEDURE — 84480 ASSAY TRIIODOTHYRONINE (T3): CPT

## 2022-10-22 PROCEDURE — 96375 TX/PRO/DX INJ NEW DRUG ADDON: CPT

## 2022-10-22 PROCEDURE — 36415 COLL VENOUS BLD VENIPUNCTURE: CPT

## 2022-10-22 PROCEDURE — 86850 RBC ANTIBODY SCREEN: CPT

## 2022-10-22 PROCEDURE — 36569 INSJ PICC 5 YR+ W/O IMAGING: CPT

## 2022-10-22 PROCEDURE — 85610 PROTHROMBIN TIME: CPT

## 2022-10-22 PROCEDURE — 86900 BLOOD TYPING SEROLOGIC ABO: CPT

## 2022-10-22 PROCEDURE — 84436 ASSAY OF TOTAL THYROXINE: CPT

## 2022-10-22 PROCEDURE — 80048 BASIC METABOLIC PNL TOTAL CA: CPT

## 2022-10-22 PROCEDURE — 84443 ASSAY THYROID STIM HORMONE: CPT

## 2022-10-22 PROCEDURE — 92523 SPEECH SOUND LANG COMPREHEN: CPT

## 2022-10-22 PROCEDURE — 99239 HOSP IP/OBS DSCHRG MGMT >30: CPT

## 2022-10-22 PROCEDURE — 85730 THROMBOPLASTIN TIME PARTIAL: CPT

## 2022-10-22 PROCEDURE — 92610 EVALUATE SWALLOWING FUNCTION: CPT

## 2022-10-22 PROCEDURE — 80053 COMPREHEN METABOLIC PANEL: CPT

## 2022-10-22 PROCEDURE — 84100 ASSAY OF PHOSPHORUS: CPT

## 2022-10-22 PROCEDURE — 70450 CT HEAD/BRAIN W/O DYE: CPT

## 2022-10-22 PROCEDURE — C1751: CPT

## 2022-10-22 PROCEDURE — 99285 EMERGENCY DEPT VISIT HI MDM: CPT

## 2022-10-22 RX ORDER — TIZANIDINE 4 MG/1
1 TABLET ORAL
Qty: 0 | Refills: 0 | DISCHARGE

## 2022-10-22 RX ADMIN — Medication 10 MILLIGRAM(S): at 14:13

## 2022-10-22 RX ADMIN — GABAPENTIN 300 MILLIGRAM(S): 400 CAPSULE ORAL at 14:14

## 2022-10-22 RX ADMIN — GABAPENTIN 300 MILLIGRAM(S): 400 CAPSULE ORAL at 05:56

## 2022-10-22 RX ADMIN — Medication 25 MILLIGRAM(S): at 05:55

## 2022-10-22 RX ADMIN — BUPROPION HYDROCHLORIDE 150 MILLIGRAM(S): 150 TABLET, EXTENDED RELEASE ORAL at 12:57

## 2022-10-22 RX ADMIN — Medication 10 MILLIGRAM(S): at 05:55

## 2022-10-22 RX ADMIN — LOSARTAN POTASSIUM 50 MILLIGRAM(S): 100 TABLET, FILM COATED ORAL at 05:55

## 2022-10-22 RX ADMIN — Medication 10 MILLIGRAM(S): at 12:57

## 2022-10-22 RX ADMIN — Medication 25 MILLIGRAM(S): at 14:15

## 2022-10-22 RX ADMIN — Medication 20 MILLIGRAM(S): at 08:32

## 2022-10-22 RX ADMIN — Medication 120 MILLIGRAM(S): at 05:56

## 2022-10-22 NOTE — PROGRESS NOTE ADULT - PROBLEM SELECTOR PLAN 1
Patient s/p andexxa at [facility]    Discussed with neurosurgery team  Repeat CT head stable  Ok to resume home meds (except eliquis) per neurosurgery team  SS eval done - rec for Soft bite sized diet. 1:1 assist with meals. Strict aspiration precautions  PT eval done - rec home PT  Discharge planning
Patient s/p andexxa at [facility]    Discussed with neurosurgery team  Repeat CT head stable  Ok to resume home meds (except eliquis) per neurosurgery team  SS eval done - rec for Soft bite sized diet. 1:1 assist with meals. Strict aspiration precautions  PT eval done - rec home PT  Discharge planning

## 2022-10-22 NOTE — PROGRESS NOTE ADULT - PROBLEM SELECTOR PLAN 3
Resume home meds Hydralazine, Losartan with hold parameters  Monitor BP
Resume home meds Hydralazine, Losartan with hold parameters  Monitor BP

## 2022-10-22 NOTE — PROGRESS NOTE ADULT - PROBLEM SELECTOR PLAN 2
#[facility]  [facility] DISCONTINUE Eliquis on DC med recs  Tele  Patient to follow up cardiology outpatient for re-eval for watchman when stable

## 2022-10-22 NOTE — PROGRESS NOTE ADULT - ASSESSMENT
72M BPH CAD, HTN, HLD, Afib on eliquis hx IPH 1yr ago, presented to [facility] with vomiting, patient takes Eliquis at home. CTH showed L IPH with parafalcine SDH.  Plt 216 PTT 42.8 INR 1.35. Patient s/p andexxa at [facility].

## 2022-10-22 NOTE — PROGRESS NOTE ADULT - PROBLEM SELECTOR PLAN 6
DVT PPX: Lovenox subcu - ok to start per neuro sx team    Discharge planning 35mins  To home with outpatient PT, speech therapy, HHA  Patient's wife is caretaker

## 2022-10-22 NOTE — PROGRESS NOTE ADULT - PROBLEM SELECTOR PLAN 4
Continue home med Baclofen    #Neuropathy  Continue home med Gabapentin    #Mood disorder  Resume home med Wellbutrin    #BPH  Resume home med Tamsulosin
Continue home med Baclofen    #Neuropathy  Continue home med Gabapentin    #Mood disorder  Resume home med Wellbutrin    #BPH  Resume home med Tamsulosin

## 2022-10-22 NOTE — PROGRESS NOTE ADULT - PROBLEM SELECTOR PLAN 5
#Med recs done per list from [facility] pharmacy: (patient unable to recall name of all his meds)     CardizemCD 120mg po qd  Rosuvastatin 10mg po hs  Gabapentin 300mg po TID  Baclofen 10mg po BID  Hydralazine 25mg po Q8H  Eliquis 2.5mg BID  Losartan 25mg po qd  Latanoprost eye drops   Tamsulosin 0.4mg po hs  Mupirocin 2% ointment BID   Methylphenidate 10mg 2 pills 8AM; 1 pill 1PM  Bethanechol 10mg po qd  Bupropion 75mg po BID  Eplerenone 25mg po qd   Tizanidine 6mg po qd
#Med recs done per list from [facility] pharmacy: (patient unable to recall name of all his meds)     CardizemCD 120mg po qd  Rosuvastatin 10mg po hs  Gabapentin 300mg po TID  Baclofen 10mg po BID  Hydralazine 25mg po Q8H  Eliquis 2.5mg BID  Losartan 25mg po qd  Latanoprost eye drops   Tamsulosin 0.4mg po hs  Mupirocin 2% ointment BID   Methylphenidate 10mg 2 pills 8AM; 1 pill 1PM  Bethanechol 10mg po qd  Bupropion 75mg po BID  Eplerenone 25mg po qd   Tizanidine 6mg po qd

## 2022-10-25 ENCOUNTER — NON-APPOINTMENT (OUTPATIENT)
Age: 72
End: 2022-10-25

## 2022-10-26 ENCOUNTER — NON-APPOINTMENT (OUTPATIENT)
Age: 72
End: 2022-10-26

## 2022-11-02 NOTE — DISCUSSION/SUMMARY
[FreeTextEntry1] : 70 year old man with HTN, HLD, DM and permanent atrial fibrillation who presents to the office for follow-up. \par \par Rex is complaining of significant lower extremity weakness. He has been undergoing an extensive workup. Of note he was hypomagnesemic recently. I will supplement it. He may also benefit from a Lyme titer. He will followup with neurology. \par \par His lipids are uncontrolled from being off of the statin. I am reluctant to resume statin therapy as it may exacerbate things though I doubt it is the culprit for his symptoms. Given his DM and hypertrigs He will start on Vascepa. He could not tolerate generic fish oil. \par  \par  He denies any anginal symptoms. Clinically he is euvolemic on exam. I will defer his stress test till other evaluation is complete./ \par  \par  He is still in AF. His heart rate is well controlled.  There are no signs or symptoms of abnormal bleeding, and he will continue Eliquis for stroke risk reduction. At present, I see no benefit to restoring NSR. \par \par His blood pressure is controlled. He will continue his current regiment. He will continue Hydralazine 50mg Q12. Check antihistone Ab to r/o drug induced lupus/vasculitis. \par repeat an echo. \par  \par Exercise, diet and lifestyle modification counseling was performed  to reduce his CV risk.  \par \par He will follow with me in 2 months. he will follow up with you fall of his other medical issues. Finasteride Pregnancy And Lactation Text: This medication is absolutely contraindicated during pregnancy. It is unknown if it is excreted in breast milk.

## 2022-11-05 ENCOUNTER — RX RENEWAL (OUTPATIENT)
Age: 72
End: 2022-11-05

## 2022-11-11 ENCOUNTER — APPOINTMENT (OUTPATIENT)
Dept: CARDIOLOGY | Facility: CLINIC | Age: 72
End: 2022-11-11

## 2022-11-11 ENCOUNTER — NON-APPOINTMENT (OUTPATIENT)
Age: 72
End: 2022-11-11

## 2022-11-11 VITALS
SYSTOLIC BLOOD PRESSURE: 125 MMHG | WEIGHT: 135 LBS | HEIGHT: 67 IN | BODY MASS INDEX: 21.19 KG/M2 | DIASTOLIC BLOOD PRESSURE: 91 MMHG | HEART RATE: 80 BPM

## 2022-11-11 PROCEDURE — 93000 ELECTROCARDIOGRAM COMPLETE: CPT

## 2022-11-11 PROCEDURE — 99215 OFFICE O/P EST HI 40 MIN: CPT

## 2022-11-11 RX ORDER — APIXABAN 2.5 MG/1
2.5 TABLET, FILM COATED ORAL
Qty: 180 | Refills: 2 | Status: DISCONTINUED | COMMUNITY
Start: 2022-06-08 | End: 2022-11-11

## 2022-11-11 NOTE — DISCUSSION/SUMMARY
[FreeTextEntry1] : 72 year old man with HTN, HLD, DM and permanent atrial fibrillation who presents to the office for follow-up. \par \par Rex unfortunately had another ICH. He is now going to be off of AC indefinitely. Will consider ASA in the future if ok with neurology. If he makes a significant recovery he will see EP to assess for watchman once better optimized. \par \par His blood pressure is uncontrolled. He will continue Cardizem 120mg Qday  He will continue Hydralazine 25mg q8. I would increase his losartan to 50mg q12 to reduce his BP. \par \par He will continue his Crestor and Vascepa for mixed hyperlipidemia.\par \par Exercise, diet and lifestyle modification counseling was performed  to reduce his CV risk.  \par \par He will follow with me in 3 months. he will follow up with you fall of his other medical issues. [EKG obtained to assist in diagnosis and management of assessed problem(s)] : EKG obtained to assist in diagnosis and management of assessed problem(s)

## 2022-11-11 NOTE — HISTORY OF PRESENT ILLNESS
[FreeTextEntry1] : 72 year old man with a history of atrial fibrillation, SDH HTN, HLD, allergic conjunctivitis, neuropathy, SDH s/p mechanical fall, recurrent ICH when on AC. \par  \par \par He is now here for a followup.   \par Since his last visit, he was admitted to  with nausea and found to have CTH showed L IPH with parafalcine SDH. I personally reviewed all of the hospital records available to me at this time, which included but are not limited to the discharge summary, labs and imaging reports. \par \par He is now more lethargic and tired. He has become more apathetic.  His PO intake is poor. He denies any chest pain. He denies palpitations, dyspnea, PND, orthopnea, lower extremity edema, LOC.  No reported melena, hematochezia or hematemesis. \par  His blood pressure has been more elevated at home 130-150/.

## 2022-11-11 NOTE — CARDIOLOGY SUMMARY
[de-identified] : poor baseline\par Atrial  fibrillation  \par LAFB low voltage. \par nonspecific T wave changes\par  [de-identified] : 7/02/20 nromal LV function mild LVH \par 6/2022 noraml LV function

## 2022-11-22 ENCOUNTER — INPATIENT (INPATIENT)
Facility: HOSPITAL | Age: 72
LOS: 6 days | Discharge: HOME | DRG: 871 | End: 2022-11-29
Attending: INTERNAL MEDICINE | Admitting: INTERNAL MEDICINE
Payer: COMMERCIAL

## 2022-11-22 VITALS
RESPIRATION RATE: 19 BRPM | SYSTOLIC BLOOD PRESSURE: 154 MMHG | TEMPERATURE: 99 F | HEART RATE: 96 BPM | HEIGHT: 65 IN | DIASTOLIC BLOOD PRESSURE: 97 MMHG | OXYGEN SATURATION: 97 % | WEIGHT: 134.92 LBS

## 2022-11-22 LAB
ALBUMIN SERPL ELPH-MCNC: 3 G/DL — LOW (ref 3.3–5)
ALP SERPL-CCNC: 123 U/L — HIGH (ref 40–120)
ALT FLD-CCNC: 26 U/L — SIGNIFICANT CHANGE UP (ref 12–78)
ANION GAP SERPL CALC-SCNC: 7 MMOL/L — SIGNIFICANT CHANGE UP (ref 5–17)
APPEARANCE UR: CLEAR — SIGNIFICANT CHANGE UP
APTT BLD: 34.9 SEC — SIGNIFICANT CHANGE UP (ref 27.5–35.5)
AST SERPL-CCNC: 13 U/L — LOW (ref 15–37)
BACTERIA # UR AUTO: ABNORMAL
BASOPHILS # BLD AUTO: 0.05 K/UL — SIGNIFICANT CHANGE UP (ref 0–0.2)
BASOPHILS NFR BLD AUTO: 0.5 % — SIGNIFICANT CHANGE UP (ref 0–2)
BILIRUB SERPL-MCNC: 0.5 MG/DL — SIGNIFICANT CHANGE UP (ref 0.2–1.2)
BILIRUB UR-MCNC: NEGATIVE — SIGNIFICANT CHANGE UP
BUN SERPL-MCNC: 10 MG/DL — SIGNIFICANT CHANGE UP (ref 7–23)
CALCIUM SERPL-MCNC: 8.9 MG/DL — SIGNIFICANT CHANGE UP (ref 8.5–10.1)
CHLORIDE SERPL-SCNC: 105 MMOL/L — SIGNIFICANT CHANGE UP (ref 96–108)
CO2 SERPL-SCNC: 28 MMOL/L — SIGNIFICANT CHANGE UP (ref 22–31)
COLOR SPEC: YELLOW — SIGNIFICANT CHANGE UP
CREAT SERPL-MCNC: 0.86 MG/DL — SIGNIFICANT CHANGE UP (ref 0.5–1.3)
DIFF PNL FLD: NEGATIVE — SIGNIFICANT CHANGE UP
EGFR: 93 ML/MIN/1.73M2 — SIGNIFICANT CHANGE UP
EOSINOPHIL # BLD AUTO: 0.16 K/UL — SIGNIFICANT CHANGE UP (ref 0–0.5)
EOSINOPHIL NFR BLD AUTO: 1.6 % — SIGNIFICANT CHANGE UP (ref 0–6)
EPI CELLS # UR: SIGNIFICANT CHANGE UP
GLUCOSE SERPL-MCNC: 227 MG/DL — HIGH (ref 70–99)
GLUCOSE UR QL: 250
HCT VFR BLD CALC: 42.2 % — SIGNIFICANT CHANGE UP (ref 39–50)
HGB BLD-MCNC: 13.8 G/DL — SIGNIFICANT CHANGE UP (ref 13–17)
IMM GRANULOCYTES NFR BLD AUTO: 1.9 % — HIGH (ref 0–0.9)
INR BLD: 1.08 RATIO — SIGNIFICANT CHANGE UP (ref 0.88–1.16)
KETONES UR-MCNC: NEGATIVE — SIGNIFICANT CHANGE UP
LACTATE SERPL-SCNC: 1.3 MMOL/L — SIGNIFICANT CHANGE UP (ref 0.7–2)
LEUKOCYTE ESTERASE UR-ACNC: NEGATIVE — SIGNIFICANT CHANGE UP
LYMPHOCYTES # BLD AUTO: 1.11 K/UL — SIGNIFICANT CHANGE UP (ref 1–3.3)
LYMPHOCYTES # BLD AUTO: 11.1 % — LOW (ref 13–44)
MCHC RBC-ENTMCNC: 26 PG — LOW (ref 27–34)
MCHC RBC-ENTMCNC: 32.7 GM/DL — SIGNIFICANT CHANGE UP (ref 32–36)
MCV RBC AUTO: 79.6 FL — LOW (ref 80–100)
MONOCYTES # BLD AUTO: 0.6 K/UL — SIGNIFICANT CHANGE UP (ref 0–0.9)
MONOCYTES NFR BLD AUTO: 6 % — SIGNIFICANT CHANGE UP (ref 2–14)
NEUTROPHILS # BLD AUTO: 7.91 K/UL — HIGH (ref 1.8–7.4)
NEUTROPHILS NFR BLD AUTO: 78.9 % — HIGH (ref 43–77)
NITRITE UR-MCNC: NEGATIVE — SIGNIFICANT CHANGE UP
NRBC # BLD: 0 /100 WBCS — SIGNIFICANT CHANGE UP (ref 0–0)
PH UR: 6.5 — SIGNIFICANT CHANGE UP (ref 5–8)
PLATELET # BLD AUTO: 263 K/UL — SIGNIFICANT CHANGE UP (ref 150–400)
POTASSIUM SERPL-MCNC: 3.7 MMOL/L — SIGNIFICANT CHANGE UP (ref 3.5–5.3)
POTASSIUM SERPL-SCNC: 3.7 MMOL/L — SIGNIFICANT CHANGE UP (ref 3.5–5.3)
PROT SERPL-MCNC: 6.6 G/DL — SIGNIFICANT CHANGE UP (ref 6–8.3)
PROT UR-MCNC: 30 MG/DL
PROTHROM AB SERPL-ACNC: 12.7 SEC — SIGNIFICANT CHANGE UP (ref 10.5–13.4)
RBC # BLD: 5.3 M/UL — SIGNIFICANT CHANGE UP (ref 4.2–5.8)
RBC # FLD: 15.5 % — HIGH (ref 10.3–14.5)
RBC CASTS # UR COMP ASSIST: SIGNIFICANT CHANGE UP /HPF (ref 0–4)
SARS-COV-2 RNA SPEC QL NAA+PROBE: SIGNIFICANT CHANGE UP
SODIUM SERPL-SCNC: 140 MMOL/L — SIGNIFICANT CHANGE UP (ref 135–145)
SP GR SPEC: 1.01 — SIGNIFICANT CHANGE UP (ref 1.01–1.02)
UROBILINOGEN FLD QL: NEGATIVE — SIGNIFICANT CHANGE UP
WBC # BLD: 10.02 K/UL — SIGNIFICANT CHANGE UP (ref 3.8–10.5)
WBC # FLD AUTO: 10.02 K/UL — SIGNIFICANT CHANGE UP (ref 3.8–10.5)
WBC UR QL: SIGNIFICANT CHANGE UP

## 2022-11-22 PROCEDURE — 93010 ELECTROCARDIOGRAM REPORT: CPT

## 2022-11-22 PROCEDURE — 70450 CT HEAD/BRAIN W/O DYE: CPT | Mod: 26,MA

## 2022-11-22 PROCEDURE — 99285 EMERGENCY DEPT VISIT HI MDM: CPT

## 2022-11-22 PROCEDURE — 71045 X-RAY EXAM CHEST 1 VIEW: CPT | Mod: 26

## 2022-11-22 NOTE — ED ADULT NURSE NOTE - OBJECTIVE STATEMENT
Pt alert and confused, presents with increased AMS/Slurred speech reported by wife at bedside. Pt bedbound and generalized weakness from previous stroke.  No other signs of acute distress noted.

## 2022-11-22 NOTE — ED ADULT NURSE NOTE - NSIMPLEMENTINTERV_GEN_ALL_ED
Implemented All Fall Risk Interventions:  [place] to call system. Call bell, personal items and telephone within reach. Instruct patient to call for assistance. Room bathroom lighting operational. Non-slip footwear when patient is off stretcher. Physically safe environment: no spills, clutter or unnecessary equipment. Stretcher in lowest position, wheels locked, appropriate side rails in place. Provide visual cue, wrist band, yellow gown, etc. Monitor gait and stability. Monitor for mental status changes and reorient to person, place, and time. Review medications for side effects contributing to fall risk. Reinforce activity limits and safety measures with patient and family.

## 2022-11-23 ENCOUNTER — NON-APPOINTMENT (OUTPATIENT)
Age: 72
End: 2022-11-23

## 2022-11-23 DIAGNOSIS — R62.7 ADULT FAILURE TO THRIVE: ICD-10-CM

## 2022-11-23 DIAGNOSIS — R53.1 WEAKNESS: ICD-10-CM

## 2022-11-23 DIAGNOSIS — G62.9 POLYNEUROPATHY, UNSPECIFIED: ICD-10-CM

## 2022-11-23 DIAGNOSIS — R41.82 ALTERED MENTAL STATUS, UNSPECIFIED: ICD-10-CM

## 2022-11-23 DIAGNOSIS — Z86.39 PERSONAL HISTORY OF OTHER ENDOCRINE, NUTRITIONAL AND METABOLIC DISEASE: ICD-10-CM

## 2022-11-23 DIAGNOSIS — R50.9 FEVER, UNSPECIFIED: ICD-10-CM

## 2022-11-23 DIAGNOSIS — G12.20 MOTOR NEURON DISEASE, UNSPECIFIED: ICD-10-CM

## 2022-11-23 DIAGNOSIS — I48.20 CHRONIC ATRIAL FIBRILLATION, UNSPECIFIED: ICD-10-CM

## 2022-11-23 DIAGNOSIS — Z29.9 ENCOUNTER FOR PROPHYLACTIC MEASURES, UNSPECIFIED: ICD-10-CM

## 2022-11-23 DIAGNOSIS — I10 ESSENTIAL (PRIMARY) HYPERTENSION: ICD-10-CM

## 2022-11-23 DIAGNOSIS — F32.9 MAJOR DEPRESSIVE DISORDER, SINGLE EPISODE, UNSPECIFIED: ICD-10-CM

## 2022-11-23 DIAGNOSIS — Z86.79 PERSONAL HISTORY OF OTHER DISEASES OF THE CIRCULATORY SYSTEM: ICD-10-CM

## 2022-11-23 LAB
A1C WITH ESTIMATED AVERAGE GLUCOSE RESULT: 9.3 % — HIGH (ref 4–5.6)
ANION GAP SERPL CALC-SCNC: 4 MMOL/L — LOW (ref 5–17)
BASOPHILS # BLD AUTO: 0 K/UL — SIGNIFICANT CHANGE UP (ref 0–0.2)
BASOPHILS NFR BLD AUTO: 0 % — SIGNIFICANT CHANGE UP (ref 0–2)
BUN SERPL-MCNC: 9 MG/DL — SIGNIFICANT CHANGE UP (ref 7–23)
CALCIUM SERPL-MCNC: 8.7 MG/DL — SIGNIFICANT CHANGE UP (ref 8.5–10.1)
CHLORIDE SERPL-SCNC: 108 MMOL/L — SIGNIFICANT CHANGE UP (ref 96–108)
CO2 SERPL-SCNC: 30 MMOL/L — SIGNIFICANT CHANGE UP (ref 22–31)
CREAT SERPL-MCNC: 0.88 MG/DL — SIGNIFICANT CHANGE UP (ref 0.5–1.3)
EGFR: 93 ML/MIN/1.73M2 — SIGNIFICANT CHANGE UP
EOSINOPHIL # BLD AUTO: 0.16 K/UL — SIGNIFICANT CHANGE UP (ref 0–0.5)
EOSINOPHIL NFR BLD AUTO: 2 % — SIGNIFICANT CHANGE UP (ref 0–6)
ESTIMATED AVERAGE GLUCOSE: 220 MG/DL — HIGH (ref 68–114)
GLUCOSE SERPL-MCNC: 221 MG/DL — HIGH (ref 70–99)
HCT VFR BLD CALC: 38.8 % — LOW (ref 39–50)
HGB BLD-MCNC: 12.6 G/DL — LOW (ref 13–17)
LYMPHOCYTES # BLD AUTO: 1.87 K/UL — SIGNIFICANT CHANGE UP (ref 1–3.3)
LYMPHOCYTES # BLD AUTO: 23 % — SIGNIFICANT CHANGE UP (ref 13–44)
MCHC RBC-ENTMCNC: 25.9 PG — LOW (ref 27–34)
MCHC RBC-ENTMCNC: 32.5 GM/DL — SIGNIFICANT CHANGE UP (ref 32–36)
MCV RBC AUTO: 79.7 FL — LOW (ref 80–100)
MONOCYTES # BLD AUTO: 0.41 K/UL — SIGNIFICANT CHANGE UP (ref 0–0.9)
MONOCYTES NFR BLD AUTO: 5 % — SIGNIFICANT CHANGE UP (ref 2–14)
NEUTROPHILS # BLD AUTO: 5.69 K/UL — SIGNIFICANT CHANGE UP (ref 1.8–7.4)
NEUTROPHILS NFR BLD AUTO: 70 % — SIGNIFICANT CHANGE UP (ref 43–77)
NRBC # BLD: SIGNIFICANT CHANGE UP /100 WBCS (ref 0–0)
PHOSPHATE SERPL-MCNC: 2.7 MG/DL — SIGNIFICANT CHANGE UP (ref 2.5–4.5)
PLATELET # BLD AUTO: 232 K/UL — SIGNIFICANT CHANGE UP (ref 150–400)
POTASSIUM SERPL-MCNC: 3.7 MMOL/L — SIGNIFICANT CHANGE UP (ref 3.5–5.3)
POTASSIUM SERPL-SCNC: 3.7 MMOL/L — SIGNIFICANT CHANGE UP (ref 3.5–5.3)
RBC # BLD: 4.87 M/UL — SIGNIFICANT CHANGE UP (ref 4.2–5.8)
RBC # FLD: 15.2 % — HIGH (ref 10.3–14.5)
SODIUM SERPL-SCNC: 142 MMOL/L — SIGNIFICANT CHANGE UP (ref 135–145)
WBC # BLD: 8.13 K/UL — SIGNIFICANT CHANGE UP (ref 3.8–10.5)
WBC # FLD AUTO: 8.13 K/UL — SIGNIFICANT CHANGE UP (ref 3.8–10.5)

## 2022-11-23 PROCEDURE — 99497 ADVNCD CARE PLAN 30 MIN: CPT

## 2022-11-23 RX ORDER — METHYLPHENIDATE HCL 5 MG
20 TABLET ORAL DAILY
Refills: 0 | Status: DISCONTINUED | OUTPATIENT
Start: 2022-11-23 | End: 2022-11-29

## 2022-11-23 RX ORDER — ONDANSETRON 8 MG/1
4 TABLET, FILM COATED ORAL EVERY 8 HOURS
Refills: 0 | Status: DISCONTINUED | OUTPATIENT
Start: 2022-11-23 | End: 2022-11-29

## 2022-11-23 RX ORDER — EPLERENONE 50 MG/1
1 TABLET, FILM COATED ORAL
Qty: 0 | Refills: 0 | DISCHARGE

## 2022-11-23 RX ORDER — SODIUM CHLORIDE 9 MG/ML
1000 INJECTION, SOLUTION INTRAVENOUS
Refills: 0 | Status: DISCONTINUED | OUTPATIENT
Start: 2022-11-23 | End: 2022-11-24

## 2022-11-23 RX ORDER — DEXTROSE 50 % IN WATER 50 %
25 SYRINGE (ML) INTRAVENOUS ONCE
Refills: 0 | Status: DISCONTINUED | OUTPATIENT
Start: 2022-11-23 | End: 2022-11-29

## 2022-11-23 RX ORDER — GABAPENTIN 400 MG/1
400 CAPSULE ORAL THREE TIMES A DAY
Refills: 0 | Status: DISCONTINUED | OUTPATIENT
Start: 2022-11-23 | End: 2022-11-24

## 2022-11-23 RX ORDER — BETHANECHOL CHLORIDE 25 MG
1 TABLET ORAL
Qty: 0 | Refills: 0 | DISCHARGE

## 2022-11-23 RX ORDER — BACLOFEN 100 %
1 POWDER (GRAM) MISCELLANEOUS
Qty: 0 | Refills: 0 | DISCHARGE

## 2022-11-23 RX ORDER — SODIUM CHLORIDE 9 MG/ML
1000 INJECTION, SOLUTION INTRAVENOUS
Refills: 0 | Status: DISCONTINUED | OUTPATIENT
Start: 2022-11-23 | End: 2022-11-29

## 2022-11-23 RX ORDER — DEXTROSE MONOHYDRATE, SODIUM CHLORIDE, AND POTASSIUM CHLORIDE 50; .745; 4.5 G/1000ML; G/1000ML; G/1000ML
500 INJECTION, SOLUTION INTRAVENOUS
Refills: 0 | Status: DISCONTINUED | OUTPATIENT
Start: 2022-11-23 | End: 2022-11-23

## 2022-11-23 RX ORDER — INSULIN LISPRO 100/ML
VIAL (ML) SUBCUTANEOUS EVERY 6 HOURS
Refills: 0 | Status: DISCONTINUED | OUTPATIENT
Start: 2022-11-23 | End: 2022-11-28

## 2022-11-23 RX ORDER — LOSARTAN POTASSIUM 100 MG/1
50 TABLET, FILM COATED ORAL DAILY
Refills: 0 | Status: DISCONTINUED | OUTPATIENT
Start: 2022-11-23 | End: 2022-11-23

## 2022-11-23 RX ORDER — HYDRALAZINE HCL 50 MG
50 TABLET ORAL EVERY 8 HOURS
Refills: 0 | Status: DISCONTINUED | OUTPATIENT
Start: 2022-11-23 | End: 2022-11-24

## 2022-11-23 RX ORDER — INSULIN LISPRO 100/ML
VIAL (ML) SUBCUTANEOUS
Refills: 0 | Status: DISCONTINUED | OUTPATIENT
Start: 2022-11-23 | End: 2022-11-23

## 2022-11-23 RX ORDER — GABAPENTIN 400 MG/1
1 CAPSULE ORAL
Qty: 0 | Refills: 0 | DISCHARGE

## 2022-11-23 RX ORDER — DILTIAZEM HCL 120 MG
120 CAPSULE, EXT RELEASE 24 HR ORAL DAILY
Refills: 0 | Status: DISCONTINUED | OUTPATIENT
Start: 2022-11-23 | End: 2022-11-24

## 2022-11-23 RX ORDER — METOPROLOL TARTRATE 50 MG
5 TABLET ORAL EVERY 6 HOURS
Refills: 0 | Status: DISCONTINUED | OUTPATIENT
Start: 2022-11-23 | End: 2022-11-25

## 2022-11-23 RX ORDER — TAMSULOSIN HYDROCHLORIDE 0.4 MG/1
1 CAPSULE ORAL
Qty: 0 | Refills: 0 | DISCHARGE

## 2022-11-23 RX ORDER — INSULIN LISPRO 100/ML
VIAL (ML) SUBCUTANEOUS AT BEDTIME
Refills: 0 | Status: DISCONTINUED | OUTPATIENT
Start: 2022-11-23 | End: 2022-11-23

## 2022-11-23 RX ORDER — DEXTROSE 50 % IN WATER 50 %
15 SYRINGE (ML) INTRAVENOUS ONCE
Refills: 0 | Status: DISCONTINUED | OUTPATIENT
Start: 2022-11-23 | End: 2022-11-29

## 2022-11-23 RX ORDER — LOSARTAN POTASSIUM 100 MG/1
50 TABLET, FILM COATED ORAL
Refills: 0 | Status: DISCONTINUED | OUTPATIENT
Start: 2022-11-23 | End: 2022-11-24

## 2022-11-23 RX ORDER — ACETAMINOPHEN 500 MG
650 TABLET ORAL EVERY 6 HOURS
Refills: 0 | Status: DISCONTINUED | OUTPATIENT
Start: 2022-11-23 | End: 2022-11-24

## 2022-11-23 RX ORDER — DEXTROSE 50 % IN WATER 50 %
12.5 SYRINGE (ML) INTRAVENOUS ONCE
Refills: 0 | Status: DISCONTINUED | OUTPATIENT
Start: 2022-11-23 | End: 2022-11-29

## 2022-11-23 RX ORDER — GLUCAGON INJECTION, SOLUTION 0.5 MG/.1ML
1 INJECTION, SOLUTION SUBCUTANEOUS ONCE
Refills: 0 | Status: DISCONTINUED | OUTPATIENT
Start: 2022-11-23 | End: 2022-11-29

## 2022-11-23 RX ORDER — MIRTAZAPINE 45 MG/1
7.5 TABLET, ORALLY DISINTEGRATING ORAL DAILY
Refills: 0 | Status: DISCONTINUED | OUTPATIENT
Start: 2022-11-23 | End: 2022-11-24

## 2022-11-23 RX ORDER — ATORVASTATIN CALCIUM 80 MG/1
40 TABLET, FILM COATED ORAL AT BEDTIME
Refills: 0 | Status: DISCONTINUED | OUTPATIENT
Start: 2022-11-23 | End: 2022-11-24

## 2022-11-23 RX ORDER — METHYLPHENIDATE HCL 5 MG
1 TABLET ORAL
Qty: 0 | Refills: 0 | DISCHARGE

## 2022-11-23 RX ORDER — SODIUM CHLORIDE 9 MG/ML
1000 INJECTION INTRAMUSCULAR; INTRAVENOUS; SUBCUTANEOUS
Refills: 0 | Status: DISCONTINUED | OUTPATIENT
Start: 2022-11-23 | End: 2022-11-25

## 2022-11-23 RX ORDER — HYDRALAZINE HCL 50 MG
1 TABLET ORAL
Qty: 0 | Refills: 0 | DISCHARGE

## 2022-11-23 RX ORDER — METHYLPHENIDATE HCL 5 MG
10 TABLET ORAL DAILY
Refills: 0 | Status: DISCONTINUED | OUTPATIENT
Start: 2022-11-23 | End: 2022-11-29

## 2022-11-23 RX ORDER — LATANOPROST 0.05 MG/ML
1 SOLUTION/ DROPS OPHTHALMIC; TOPICAL AT BEDTIME
Refills: 0 | Status: DISCONTINUED | OUTPATIENT
Start: 2022-11-23 | End: 2022-11-29

## 2022-11-23 RX ORDER — BUPROPION HYDROCHLORIDE 150 MG/1
1 TABLET, EXTENDED RELEASE ORAL
Qty: 0 | Refills: 0 | DISCHARGE

## 2022-11-23 RX ADMIN — SODIUM CHLORIDE 60 MILLILITER(S): 9 INJECTION INTRAMUSCULAR; INTRAVENOUS; SUBCUTANEOUS at 09:12

## 2022-11-23 RX ADMIN — Medication 2: at 11:36

## 2022-11-23 RX ADMIN — Medication 1: at 17:55

## 2022-11-23 RX ADMIN — SODIUM CHLORIDE 500 MILLILITER(S): 9 INJECTION, SOLUTION INTRAVENOUS at 12:29

## 2022-11-23 RX ADMIN — ATORVASTATIN CALCIUM 40 MILLIGRAM(S): 80 TABLET, FILM COATED ORAL at 21:20

## 2022-11-23 RX ADMIN — GABAPENTIN 400 MILLIGRAM(S): 400 CAPSULE ORAL at 21:20

## 2022-11-23 RX ADMIN — LATANOPROST 1 DROP(S): 0.05 SOLUTION/ DROPS OPHTHALMIC; TOPICAL at 21:21

## 2022-11-23 RX ADMIN — Medication 50 MILLIGRAM(S): at 15:33

## 2022-11-23 RX ADMIN — Medication 50 MILLIGRAM(S): at 21:21

## 2022-11-23 RX ADMIN — GABAPENTIN 400 MILLIGRAM(S): 400 CAPSULE ORAL at 15:33

## 2022-11-23 RX ADMIN — Medication 2: at 09:11

## 2022-11-23 RX ADMIN — LOSARTAN POTASSIUM 50 MILLIGRAM(S): 100 TABLET, FILM COATED ORAL at 17:47

## 2022-11-23 RX ADMIN — Medication 5 MILLIGRAM(S): at 17:49

## 2022-11-23 RX ADMIN — SODIUM CHLORIDE 60 MILLILITER(S): 9 INJECTION INTRAMUSCULAR; INTRAVENOUS; SUBCUTANEOUS at 02:29

## 2022-11-23 NOTE — H&P ADULT - ASSESSMENT
72M BPH CAD, HTN, HLD, Afib, SDH s/p mechanical fall, recurrent ICH when on AC brought today for weakness. History obtained from pt's wife Love and chart review given patient medical condition. Pt admitted from 10/19 to 10/22/2022 at [facility] for SHD s/p adexxa. Pt's wife endorses that patient has been progressively deteriorating, states that now only is reactive to painful stimulus, prior used to articulate some words. Patient admitted for acute on chronic AMS.     72 M BPH CAD, HTN, HLD, Afib, SDH s/p mechanical fall, recurrent ICH when on AC brought today for weakness. History obtained from pt's wife Love and chart review given patient medical condition. Pt admitted from 10/19 to 10/22/2022 at [facility] for SHD s/p adexxa. Pt's wife endorses that patient has been progressively deteriorating, states that now only is reactive to painful stimulus, prior used to articulate some words. Patient admitted for acute on chronic AMS.

## 2022-11-23 NOTE — H&P ADULT - PROBLEM SELECTOR PLAN 2
Pt admitted from 10/19 to 10/22/2022 at [facility] for SHD s/p adexxa. Off of AC.   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  Mild mass effect with sulcal effacement and partial compression of the  left lateral ventricle. No new acute intracranial hemorrhage.  - Neuro Dr. Main consulted

## 2022-11-23 NOTE — H&P ADULT - NEUROLOGICAL
cranial nerves II-XII intact/responds to pain contractures Ext/cranial nerves II-XII intact/responds to pain/responds to verbal commands/cranial nerves intact

## 2022-11-23 NOTE — H&P ADULT - NSICDXPASTMEDICALHX_GEN_ALL_CORE_FT
PAST MEDICAL HISTORY:  Atrial fibrillation     BPH (benign prostatic hyperplasia)     CAD (coronary artery disease)     Hemorrhage in the brain     HLD (hyperlipidemia)     HTN (hypertension)     Major depression     Peripheral neuropathy     Pneumonia due to COVID-19 virus June 2022    TBI (traumatic brain injury)

## 2022-11-23 NOTE — ED PROVIDER NOTE - NEUROLOGICAL, MLM
Alert and oriented x 0, unable to follow commands or answer simple questions.  Cries out to painful stimuli

## 2022-11-23 NOTE — ED PROVIDER NOTE - MUSCULOSKELETAL, MLM
b/l UE contracted,  strength stronger L>R, 1+ LE edema b/l with multiple scabs, patient unable to lift or move legs

## 2022-11-23 NOTE — H&P ADULT - CARDIOVASCULAR
irregular rate and rhythm/Irregularly irregular rhythm S1 S2 present/no JVD/no pedal edema/irregular rate and rhythm/Irregularly irregular rhythm

## 2022-11-23 NOTE — H&P ADULT - PROBLEM SELECTOR PLAN 1
Acute on chronic, per wife pt has been progressively deteriorating, reactive to painful stimulus only, prior used to articulate some words.  - Admit to F   - CT head showed chronic subdural hematomas overlying the left side of the posterior falx measuring up to 1.7 cm and 1.5 cm overlying the left temporal convexity.  Mild mass effect with sulcal effacement and partial compression of the  left lateral ventricle. No new acute intracranial hemorrhage.  - Will keep NPO for now until S&S recommendations   - Maintenance IVF NS @ 60 cc/h   - Neuro Dr. Main consulted   - Nutrition consulted  - Fall risk protocol, OOB with assistance  - PT consulted  - SW consulted for placement   - Palliative care consulted for [facility] Acute on chronic, per wife pt has been progressively deteriorating, reactive to painful stimulus only, prior used to articulate some words.  - Admit to [facility]   - CT head showed chronic subdural hematomas overlying the left side of the posterior falx measuring up to 1.7 cm and 1.5 cm overlying the left temporal convexity.  Mild mass effect with sulcal effacement and partial compression of the  left lateral ventricle. No new acute intracranial hemorrhage.  - Will keep NPO, S&S unable to asses pt in AM, will try again  - Maintenance IVF NS @ 60 cc/h   - Neuro Dr. Main consulted   - Nutrition consulted  - Fall risk protocol, OOB with assistance  - PT consulted  - SW consulted for placement   - Palliative care consulted for [facility] Acute on chronic, per wife pt has been progressively deteriorating, reactive to painful stimulus only, prior used to articulate some words.  - Admit to F   - CT head showed chronic subdural hematomas overlying the left side of the posterior falx measuring up to 1.7 cm and 1.5 cm overlying the left temporal convexity.  Mild mass effect with sulcal effacement and partial compression of the  left lateral ventricle. No new acute intracranial hemorrhage.  - Will keep NPO, S&S unable to asses pt in AM, will try again  - Maintenance IVF NS @ 60 cc/h   - Neuro Dr. Main consulted   - Nutrition consulted  - GI Dr. Kelly consult for potential NG tube/PEG   - Fall risk protocol, OOB with assistance  - PT consulted  - SW consulted for placement   - Palliative care consulted for [facility]

## 2022-11-23 NOTE — H&P ADULT - RESPIRATORY
no wheezes/no rales/airway patent/good air movement/no intercostal retractions no wheezes/no rales/no respiratory distress/airway patent/breath sounds equal/good air movement/no intercostal retractions

## 2022-11-23 NOTE — PROGRESS NOTE ADULT - SUBJECTIVE AND OBJECTIVE BOX
neuro cons dict seen for ams  etiology multifactorial   ME/TBI  ?REMERON  consider dc remeron  ?need for brain mri  tsh/b12/ammonia level

## 2022-11-23 NOTE — H&P ADULT - PROBLEM SELECTOR PLAN 5
Chronic  - Hydralazine 50 mg q8h and losartan 50 q12h, will continue with holding parameters Chronic  - Hydralazine 50 mg q8h and losartan 50 q12h, will holding parameters  - PO meds held for now  - START IV lopressor 5mg q6 Pt wife reports a temp of 99 F oral at home, on admission Afebrile w/o evidence of leukocytosis, UA negative for nitrate, LE, bacteria.   - F/up urine and blood cultures  - Trend CBC with diff and Temp  -Aspiration precaution

## 2022-11-23 NOTE — H&P ADULT - PROBLEM SELECTOR PLAN 6
Chronic, on ritalin 20 mg @ 8 am and 10 mg 13:00, will continue   - Fall and aspiration precautions   - Change position every 2 hours   - PT consulted

## 2022-11-23 NOTE — ED ADULT NURSE REASSESSMENT NOTE - NS ED NURSE REASSESS COMMENT FT1
Patient sleeping in bed.  No distress noted.  diastolic BP noted as high.  will continue to monitor.

## 2022-11-23 NOTE — ED PROVIDER NOTE - OBJECTIVE STATEMENT
72 year old male with a history of BPH, CAD, HTN, HLD, a-fib presents with slurred speech and weakness x 1 week.  History provided by wife at bedside.  Patient was seen here on 10/19 and transferred to [facility] for acute left IPH and SDH.  He was discharged home on 10/22, Eliquis stopped.  Wife states patient was receiving outpatient rehab services 2x/week and has an aide for 8-10 hours daily.  For the past week, patient has had worsening speech, not responding to questions, often speaking jibberish.  Wife also noted that patient is weak, has stopped feeding himself.  At home today, temp was 99.9.  PMD Scar Keith 72 year old male with a history of BPH, CAD, HTN, HLD, a-fib presents with slurred speech and weakness x 1 week.  History provided by wife at bedside.  Patient was seen here on 10/19 and transferred to [facility] for acute left IPH and SDH.  He was discharged home on 10/22, Eliquis stopped.  Wife states patient was receiving outpatient rehab services 2x/week and has an aide for 8-10 hours daily.  At baseline, he does not walk.  For the past week, patient has had worsening speech, not responding to questions, often speaking jibberish.  Wife also noted that patient is weak, has stopped feeding himself.  At home today, temp was 99.9.  PMD Scar Keith

## 2022-11-23 NOTE — H&P ADULT - PROBLEM SELECTOR PLAN 8
Per wife patient has history of T2DM but resolved, not on meds.   - UA showing glucosuria of 250 and glucose of 227 on admission   - F/up HgbA1c  - Low Insulin sliding scale  - Accu checks w/ hypoglycemic protocol Per wife patient has history of T2DM but resolved, not on meds.   - UA showing glucosuria of 250 and glucose of 227 on admission   - HbA1c 9.3  - Low Insulin sliding scale  - Accu checks w/ hypoglycemic protocol  - Pt NPO for now Chronic   - Continue home gabapentin 400 mg TID with holding parameters for sedation/lethargy  - Oral meds held for now as pt is NPO

## 2022-11-23 NOTE — PHYSICAL THERAPY INITIAL EVALUATION ADULT - ADDITIONAL COMMENTS
Per EMR Pt lives in a private house +1 step to enter. Lives with spouse, niece and 24/7 HHA. All needs met on main floor. PTA, pt was non-ambulatory and was wheelchair bound. Pt owns W/C, patient lift device and hospital bed

## 2022-11-23 NOTE — H&P ADULT - MUSCULOSKELETAL
no joint swelling/no joint erythema/decreased ROM/decreased strength no joint swelling/no joint erythema/no calf tenderness/decreased ROM/decreased strength

## 2022-11-23 NOTE — H&P ADULT - PROBLEM SELECTOR PLAN 7
Chronic   - Continue home gabapentin 400 mg TID with holding parameters for sedation/lethargy Chronic   - Continue home gabapentin 400 mg TID with holding parameters for sedation/lethargy  - Oral meds held for now as pt is NPO Per wife patient has history of T2DM but resolved, not on meds.   - UA showing glucosuria of 250 and glucose of 227 on admission   - HbA1c 9.3  - Low Insulin sliding scale  - Accu checks w/ hypoglycemic protocol  - Pt NPO for now

## 2022-11-23 NOTE — ED PROVIDER NOTE - CLINICAL SUMMARY MEDICAL DECISION MAKING FREE TEXT BOX
72 year old make with a history of TBI last year and non-traumatic IPH/SDH last month presents with slurred speech, weakness, and inability to feed himself x 1 week.  Wife noted low-grade temp at home.  Check labs, UA, CXR, EKG, UA, septic work up, CT head.  Admission to see neurology and social work consultation for placement to rehab if no acute findings

## 2022-11-23 NOTE — H&P ADULT - PROBLEM SELECTOR PLAN 4
Chronic   - EKG shows Afib with RVR @ 110 bpm, Right axis deviation   - Takes at home Cardizem 120 mg CD qd, will continue with holding parameters  - F/up Mag and phos   - Outpatient cardio Dr. Goldsmith Chronic   - EKG shows Afib with RVR @ 110 bpm, Right axis deviation   - Takes at home Cardizem 120 mg CD qd, will continue with holding parameters  - Replete mag and phos as needed  - Outpatient cardio Dr. Goldsmith Chronic   - EKG shows Afib with RVR @ 110 bpm, Right axis deviation   - Takes at home Cardizem 120 mg CD qd, will continue with holding parameters  - PO meds held for now  - START IV lopressor 5mg q6  - Replete mag and phos as needed  - Outpatient cardio Dr. Goldsmith Chronic  - Hydralazine 50 mg q8h and losartan 50 q12h, will holding parameters  - PO meds held for now  - START IV lopressor 5mg q6

## 2022-11-23 NOTE — H&P ADULT - NSHPSOCIALHISTORY_GEN_ALL_CORE
Tobacco: denies   EtOH: denies   Recreational drug use: denies   Lives with: wife   Ambulates: bedbound, has a nurse aid 8-10 daily   Vaccinations: Fully COVID-19 vaccinated

## 2022-11-23 NOTE — SWALLOW BEDSIDE ASSESSMENT ADULT - SLP PERTINENT HISTORY OF CURRENT PROBLEM
Pt known to speech tx from previous admission had MBS 7/5/22 Rx "Regular and thin liquids, via single/small cup sips only, as tolerated".  Pt recently seen at [facility] for speech & swallow assessments rx "Easy to chew with thin liquids with feeding assistance and tray set up to cup up food; Maintain aspiration precautions. Pt also presents with cognitive linguistic deficits therefore consider Outpt speech language therapy if skills not deemed to be at baseline."

## 2022-11-23 NOTE — H&P ADULT - ATTENDING COMMENTS
72 M BPH CAD, HTN, HLD, Afib, SDH s/p mechanical fall, recurrent ICH when on AC brought today for weakness. History obtained from pt's wife Love and chart review given patient medical condition. Pt admitted from 10/19 to 10/22/2022 at [facility] for SHD s/p adexxa. Pt's wife endorses that patient has been progressively deteriorating, states that now only is reactive to painful stimulus, prior used to articulate some words. Patient admitted for acute on chronic AMS.  Pt seen, examined, case & care plan d/w pt, residents at [facility].  Neurology-DR Main   Palliative Care Eval   D/W Wife at [facility]. NPO, IV Fluids, awaiting S & S Eval.   NPO for aspiration Precaution.  SCD   PT Eval   Social service Eval.

## 2022-11-23 NOTE — H&P ADULT - HISTORY OF PRESENT ILLNESS
72M BPH CAD, HTN, HLD, Afib, SDH s/p mechanical fall, recurrent ICH when on AC brought today for weakness. History obtained from pt's wife Love and chart review given patient medical condition. Pt admitted from 10/19 to 10/22/2022 at [facility] for SHD s/p adexxa. Pt's wife endorses that patient has been progressively deteriorating, states that now only is reactive to painful stimulus, prior used to articulate some words. Patient completed 2 weeks of outpatient PT, has 8-10 hours nurse aid daily. Temp at home 99.9. Wife assist with feeding, was seen by neuro who start mirtazapine and sent for neurologic work up.     ED course  VS: Afebrile, HR: 96, BP: 154/97, SpO2: 97% RA RR 19  Labs significant for glucose 227, Alkaline phosphate 227, UA shows glucose 250  CT head showed chronic subdural hematomas overlying the left side of the posterior falx measuring up to 1.7 cm and 1.5 cm overlying the left temporal convexity.  Mild mass effect with sulcal effacement and partial compression of the  left lateral ventricle. No new acute intracranial hemorrhage.  EKG shows Afib with RVR @ 110 bpm, Right axis deviation

## 2022-11-23 NOTE — H&P ADULT - PROBLEM SELECTOR PLAN 3
Pt wife reports a temp of 99 F oral at home, on admission Afebrile w/o evidence of leukocytosis, UA negative for nitrate, LE, bacteria.   - F/up  urine and blood cultures  - Trend CBC with diff and Temp Pt wife reports a temp of 99 F oral at home, on admission Afebrile w/o evidence of leukocytosis, UA negative for nitrate, LE, bacteria.   - F/up urine and blood cultures  - Trend CBC with diff and Temp Chronic   - EKG shows Afib with RVR @ 110 bpm, Right axis deviation   - Takes at home Cardizem 120 mg CD qd, will continue with holding parameters  - PO meds held for now  - START IV lopressor 5mg q6  - Replete mag and phos as needed  - Outpatient cardio Dr. Goldsmith

## 2022-11-23 NOTE — H&P ADULT - GASTROINTESTINAL
normal/soft/nontender/nondistended/normal active bowel sounds normal/soft/nontender/nondistended/normal active bowel sounds/no guarding/no rigidity/no organomegaly/no palpable moreno/no masses palpable

## 2022-11-23 NOTE — H&P ADULT - PROBLEM SELECTOR PLAN 9
Chronic   - Recently Neuro start mirtazapine Chronic   - Recently Neuro start mirtazapine  - Oral meds held for now as pt is NPO

## 2022-11-23 NOTE — PHYSICAL THERAPY INITIAL EVALUATION ADULT - PERTINENT HX OF CURRENT PROBLEM, REHAB EVAL
73 yo M for weakness. Pt admitted from 10/19 to 10/22/2022 at [facility] for SHD s/p adexxa. Pt's wife endorses that patient has been progressively deteriorating, states that now only is reactive to painful stimulus, prior used to articulate some words. Patient completed 2 weeks of outpatient PT, has 8-10 hours nurse aid daily. CT head showed chronic subdural hematomas overlying the left side of the posterior falx measuring up to 1.7 cm and 1.5 cm overlying the left temporal convexity.  Mild mass effect with sulcal effacement and partial compression of the  left lateral ventricle. No new acute intracranial hemorrhage.

## 2022-11-24 DIAGNOSIS — D64.9 ANEMIA, UNSPECIFIED: ICD-10-CM

## 2022-11-24 DIAGNOSIS — R50.9 FEVER, UNSPECIFIED: ICD-10-CM

## 2022-11-24 LAB
ALBUMIN SERPL ELPH-MCNC: 2.3 G/DL — LOW (ref 3.3–5)
ALP SERPL-CCNC: 87 U/L — SIGNIFICANT CHANGE UP (ref 40–120)
ALT FLD-CCNC: 15 U/L — SIGNIFICANT CHANGE UP (ref 12–78)
AMMONIA BLD-MCNC: 26 UMOL/L — SIGNIFICANT CHANGE UP (ref 11–32)
ANION GAP SERPL CALC-SCNC: 9 MMOL/L — SIGNIFICANT CHANGE UP (ref 5–17)
APPEARANCE UR: ABNORMAL
AST SERPL-CCNC: 11 U/L — LOW (ref 15–37)
BACTERIA # UR AUTO: ABNORMAL
BILIRUB SERPL-MCNC: 0.7 MG/DL — SIGNIFICANT CHANGE UP (ref 0.2–1.2)
BILIRUB UR-MCNC: NEGATIVE — SIGNIFICANT CHANGE UP
BUN SERPL-MCNC: 10 MG/DL — SIGNIFICANT CHANGE UP (ref 7–23)
CALCIUM SERPL-MCNC: 8.3 MG/DL — LOW (ref 8.5–10.1)
CHLORIDE SERPL-SCNC: 110 MMOL/L — HIGH (ref 96–108)
CO2 SERPL-SCNC: 24 MMOL/L — SIGNIFICANT CHANGE UP (ref 22–31)
COLOR SPEC: YELLOW — SIGNIFICANT CHANGE UP
COMMENT - URINE: SIGNIFICANT CHANGE UP
CREAT SERPL-MCNC: 0.97 MG/DL — SIGNIFICANT CHANGE UP (ref 0.5–1.3)
CULTURE RESULTS: SIGNIFICANT CHANGE UP
DIFF PNL FLD: ABNORMAL
EGFR: 84 ML/MIN/1.73M2 — SIGNIFICANT CHANGE UP
EPI CELLS # UR: SIGNIFICANT CHANGE UP
GLUCOSE SERPL-MCNC: 184 MG/DL — HIGH (ref 70–99)
GLUCOSE UR QL: 50 MG/DL
HCT VFR BLD CALC: 36.7 % — LOW (ref 39–50)
HGB BLD-MCNC: 12.2 G/DL — LOW (ref 13–17)
KETONES UR-MCNC: ABNORMAL
LACTATE SERPL-SCNC: 1.2 MMOL/L — SIGNIFICANT CHANGE UP (ref 0.7–2)
LEUKOCYTE ESTERASE UR-ACNC: ABNORMAL
MAGNESIUM SERPL-MCNC: 1.8 MG/DL — SIGNIFICANT CHANGE UP (ref 1.6–2.6)
MCHC RBC-ENTMCNC: 26.2 PG — LOW (ref 27–34)
MCHC RBC-ENTMCNC: 33.2 GM/DL — SIGNIFICANT CHANGE UP (ref 32–36)
MCV RBC AUTO: 78.9 FL — LOW (ref 80–100)
NITRITE UR-MCNC: NEGATIVE — SIGNIFICANT CHANGE UP
NRBC # BLD: 0 /100 WBCS — SIGNIFICANT CHANGE UP (ref 0–0)
PH UR: 6 — SIGNIFICANT CHANGE UP (ref 5–8)
PHOSPHATE SERPL-MCNC: 2.3 MG/DL — LOW (ref 2.5–4.5)
PLATELET # BLD AUTO: 247 K/UL — SIGNIFICANT CHANGE UP (ref 150–400)
POTASSIUM SERPL-MCNC: 3.2 MMOL/L — LOW (ref 3.5–5.3)
POTASSIUM SERPL-SCNC: 3.2 MMOL/L — LOW (ref 3.5–5.3)
PROT SERPL-MCNC: 5.2 G/DL — LOW (ref 6–8.3)
PROT UR-MCNC: 100
RBC # BLD: 4.65 M/UL — SIGNIFICANT CHANGE UP (ref 4.2–5.8)
RBC # FLD: 15.3 % — HIGH (ref 10.3–14.5)
RBC CASTS # UR COMP ASSIST: >50 /HPF (ref 0–4)
SODIUM SERPL-SCNC: 143 MMOL/L — SIGNIFICANT CHANGE UP (ref 135–145)
SP GR SPEC: 1.01 — SIGNIFICANT CHANGE UP (ref 1.01–1.02)
SPECIMEN SOURCE: SIGNIFICANT CHANGE UP
UROBILINOGEN FLD QL: 1
VIT B12 SERPL-MCNC: 656 PG/ML — SIGNIFICANT CHANGE UP (ref 232–1245)
WBC # BLD: 25.02 K/UL — HIGH (ref 3.8–10.5)
WBC # FLD AUTO: 25.02 K/UL — HIGH (ref 3.8–10.5)
WBC UR QL: >50

## 2022-11-24 PROCEDURE — 71045 X-RAY EXAM CHEST 1 VIEW: CPT | Mod: 26

## 2022-11-24 RX ORDER — PIPERACILLIN AND TAZOBACTAM 4; .5 G/20ML; G/20ML
3.38 INJECTION, POWDER, LYOPHILIZED, FOR SOLUTION INTRAVENOUS EVERY 8 HOURS
Refills: 0 | Status: DISCONTINUED | OUTPATIENT
Start: 2022-11-24 | End: 2022-11-28

## 2022-11-24 RX ORDER — DEXTROSE MONOHYDRATE, SODIUM CHLORIDE, AND POTASSIUM CHLORIDE 50; .745; 4.5 G/1000ML; G/1000ML; G/1000ML
1000 INJECTION, SOLUTION INTRAVENOUS
Refills: 0 | Status: DISCONTINUED | OUTPATIENT
Start: 2022-11-24 | End: 2022-11-25

## 2022-11-24 RX ORDER — SODIUM CHLORIDE 9 MG/ML
500 INJECTION INTRAMUSCULAR; INTRAVENOUS; SUBCUTANEOUS ONCE
Refills: 0 | Status: COMPLETED | OUTPATIENT
Start: 2022-11-24 | End: 2022-11-24

## 2022-11-24 RX ORDER — POTASSIUM CHLORIDE 20 MEQ
10 PACKET (EA) ORAL
Refills: 0 | Status: COMPLETED | OUTPATIENT
Start: 2022-11-24 | End: 2022-11-24

## 2022-11-24 RX ORDER — PIPERACILLIN AND TAZOBACTAM 4; .5 G/20ML; G/20ML
3.38 INJECTION, POWDER, LYOPHILIZED, FOR SOLUTION INTRAVENOUS ONCE
Refills: 0 | Status: COMPLETED | OUTPATIENT
Start: 2022-11-24 | End: 2022-11-24

## 2022-11-24 RX ORDER — ACETAMINOPHEN 500 MG
650 TABLET ORAL EVERY 6 HOURS
Refills: 0 | Status: DISCONTINUED | OUTPATIENT
Start: 2022-11-24 | End: 2022-11-29

## 2022-11-24 RX ORDER — ACETAMINOPHEN 500 MG
1000 TABLET ORAL ONCE
Refills: 0 | Status: COMPLETED | OUTPATIENT
Start: 2022-11-24 | End: 2022-11-24

## 2022-11-24 RX ADMIN — Medication 100 MILLIEQUIVALENT(S): at 13:25

## 2022-11-24 RX ADMIN — Medication 5 MILLIGRAM(S): at 18:03

## 2022-11-24 RX ADMIN — SODIUM CHLORIDE 60 MILLILITER(S): 9 INJECTION INTRAMUSCULAR; INTRAVENOUS; SUBCUTANEOUS at 05:33

## 2022-11-24 RX ADMIN — Medication 1: at 12:41

## 2022-11-24 RX ADMIN — SODIUM CHLORIDE 1000 MILLILITER(S): 9 INJECTION INTRAMUSCULAR; INTRAVENOUS; SUBCUTANEOUS at 07:24

## 2022-11-24 RX ADMIN — LATANOPROST 1 DROP(S): 0.05 SOLUTION/ DROPS OPHTHALMIC; TOPICAL at 22:39

## 2022-11-24 RX ADMIN — Medication 400 MILLIGRAM(S): at 06:11

## 2022-11-24 RX ADMIN — Medication 1000 MILLIGRAM(S): at 06:50

## 2022-11-24 RX ADMIN — SODIUM CHLORIDE 60 MILLILITER(S): 9 INJECTION INTRAMUSCULAR; INTRAVENOUS; SUBCUTANEOUS at 07:14

## 2022-11-24 RX ADMIN — Medication 100 MILLIEQUIVALENT(S): at 16:07

## 2022-11-24 RX ADMIN — SODIUM CHLORIDE 60 MILLILITER(S): 9 INJECTION INTRAMUSCULAR; INTRAVENOUS; SUBCUTANEOUS at 13:24

## 2022-11-24 RX ADMIN — PIPERACILLIN AND TAZOBACTAM 25 GRAM(S): 4; .5 INJECTION, POWDER, LYOPHILIZED, FOR SOLUTION INTRAVENOUS at 16:11

## 2022-11-24 RX ADMIN — Medication 10 MILLIGRAM(S): at 12:42

## 2022-11-24 RX ADMIN — Medication 2: at 18:03

## 2022-11-24 RX ADMIN — Medication 5 MILLIGRAM(S): at 00:05

## 2022-11-24 RX ADMIN — SODIUM CHLORIDE 166.67 MILLILITER(S): 9 INJECTION INTRAMUSCULAR; INTRAVENOUS; SUBCUTANEOUS at 13:24

## 2022-11-24 RX ADMIN — PIPERACILLIN AND TAZOBACTAM 25 GRAM(S): 4; .5 INJECTION, POWDER, LYOPHILIZED, FOR SOLUTION INTRAVENOUS at 22:39

## 2022-11-24 RX ADMIN — Medication 1: at 05:52

## 2022-11-24 RX ADMIN — PIPERACILLIN AND TAZOBACTAM 200 GRAM(S): 4; .5 INJECTION, POWDER, LYOPHILIZED, FOR SOLUTION INTRAVENOUS at 12:42

## 2022-11-24 RX ADMIN — Medication 100 MILLIEQUIVALENT(S): at 14:57

## 2022-11-24 NOTE — PROVIDER CONTACT NOTE (CHANGE IN STATUS NOTIFICATION) - RECOMMENDATIONS
Tylenol ivpb. Cooling blanket, lab work, urine culture/UA. O22L NC. As per MD no rapid response at this time. Spoke to nursing superviser carlito Mcgowan rapid at this time. Patient remains DNR/DNI.

## 2022-11-24 NOTE — PROGRESS NOTE ADULT - PROBLEM SELECTOR PLAN 4
Chronic   - EKG shows Afib with RVR @ 110 bpm, Right axis deviation   - home Cardizem 120 mg CD qd ON HOLD   - PO meds held for now  - START IV lopressor 5mg q6  - Replete mag and phos as needed  - Outpatient cardio Dr. Goldsimth N   NO AC 2/2 recent Brain Hemorrhage / SDH

## 2022-11-24 NOTE — CHART NOTE - NSCHARTNOTEFT_GEN_A_CORE
Called by RN for patient with new fevers with temp 103.8F and not responsive. As per RN, pt is not opening his eyes to pain stimuli. Patient seen and examined at bedside. Pt appears to be resting comfortably. Not responding to voice. However pt reacts with facial reaction to painful stimuli with sternal rub and abdominal exam. As per charts and discussion, pt was like this at baseline and this is the reason why pt was admitted to the hospital.    Vitals:  T(C): 39.9 (11-24-22 @ 05:45), Max: 39.9 (11-24-22 @ 05:45)  HR: 110 (11-24-22 @ 05:45) (70 - 120)  BP: 107/70 (11-24-22 @ 05:45) (107/70 - 178/95)  RR: 24 (11-24-22 @ 05:50) (18 - 24)  SpO2: 92% (11-24-22 @ 05:50) (89% - 94%)    Physical Exam:  Gen: Well appearing, NAD, cachectic  HEENT: NCAT, PEERLA b/l, no conjunctival erythema  Cardio: Irregular rate and rhythm, +S1S2, no murmurs, rubs, or gallops  Pulm: CTA b/l, no wheezes, rales, or rhonchi  Abdomen: Soft, nondistended, +BS, no guarding, pt reacted to deep palpation  Extremities: No clubbing, cyanosis  Neuro: Does not respond to voice, does react to painful stimuli  Skin: Warm and dry    Assessment/Plan  72 M BPH CAD, HTN, HLD, Afib, SDH s/p mechanical fall, recurrent ICH when on AC brought today for weakness. History obtained from pt's wife Love and chart review given patient medical condition. Pt admitted from 10/19 to 10/22/2022 at [facility] for SHD s/p adexxa. Pt's wife endorses that patient has been progressively deteriorating, states that now only is reactive to painful stimulus, prior used to articulate some words. Patient admitted for acute on chronic AMS.    - Pt appears to be at baseline mental status, reacts to painful stimuli however refusing to open eyes  - Pupils reactive to light  - Give 1x Ofirmev now  - Cooling blanket  - STAT Lactate ordered  - STAT repeat Blood cx x2, UA, urine cx ordered  - Will continue to monitor, RN to call if any changes. Called by RN for patient with new fevers with temp 103.8F and not responsive. As per RN, pt is not opening his eyes to pain stimuli. Also pt was satting 89% on RA - improved with 2L NC to 92%. Patient seen and examined at bedside. Pt appears to be resting comfortably. Not responding to voice. However pt reacts with facial reaction to painful stimuli with sternal rub and abdominal exam. As per charts and discussion, pt was like this at baseline and this is the reason why pt was admitted to the hospital.    Vitals:  T(C): 39.9 (11-24-22 @ 05:45), Max: 39.9 (11-24-22 @ 05:45)  HR: 110 (11-24-22 @ 05:45) (70 - 120)  BP: 107/70 (11-24-22 @ 05:45) (107/70 - 178/95)  RR: 24 (11-24-22 @ 05:50) (18 - 24)  SpO2: 92% (11-24-22 @ 05:50) (89% - 94%)    Physical Exam:  Gen: Well appearing, NAD, cachectic  HEENT: NCAT, PEERLA b/l, no conjunctival erythema  Cardio: Irregular rate and rhythm, +S1S2, no murmurs, rubs, or gallops  Pulm: CTA b/l, no wheezes, rales, or rhonchi  Abdomen: Soft, nondistended, +BS, no guarding, pt reacted to deep palpation  Extremities: No clubbing, cyanosis  Neuro: Does not respond to voice, does react to painful stimuli  Skin: Warm and dry    Assessment/Plan  72 M BPH CAD, HTN, HLD, Afib, SDH s/p mechanical fall, recurrent ICH when on AC brought today for weakness. History obtained from pt's wife Love and chart review given patient medical condition. Pt admitted from 10/19 to 10/22/2022 at [facility] for SHD s/p adexxa. Pt's wife endorses that patient has been progressively deteriorating, states that now only is reactive to painful stimulus, prior used to articulate some words. Patient admitted for acute on chronic AMS.    - Pt appears to be at baseline mental status, reacts to painful stimuli however refusing to open eyes  - Pupils reactive to light  - Give 1x Ofirmev now  - Cooling blanket  - STAT Lactate ordered  - STAT repeat Blood cx x2, UA, urine cx ordered  - Start on 2L NC and titrate as needed  - Hemodynamically stable currently  - Monitor resp status  - Will continue to monitor, RN to call if any changes. Called by RN for patient with new fevers with temp 103.8F and not responsive. As per RN, pt is not opening his eyes to pain stimuli. Also pt was satting 89% on RA - improved with 2L NC to 92%. Patient seen and examined at bedside. Pt appears to be resting comfortably. Not responding to voice. However pt reacts with facial reaction to painful stimuli with sternal rub and abdominal exam. As per charts and discussion, pt was like this at baseline and this is the reason why pt was admitted to the hospital.    Vitals:  T(C): 39.9 (11-24-22 @ 05:45), Max: 39.9 (11-24-22 @ 05:45)  HR: 110 (11-24-22 @ 05:45) (70 - 120)  BP: 107/70 (11-24-22 @ 05:45) (107/70 - 178/95)  RR: 24 (11-24-22 @ 05:50) (18 - 24)  SpO2: 92% (11-24-22 @ 05:50) (89% - 94%)    Physical Exam:  Gen: Well appearing, NAD, cachectic  HEENT: NCAT, PEERLA b/l, no conjunctival erythema  Cardio: Irregular rate and rhythm, +S1S2, no murmurs, rubs, or gallops  Pulm: CTA b/l, no wheezes, rales, or rhonchi  Abdomen: Soft, nondistended, +BS, no guarding, pt reacted to deep palpation  Extremities: No clubbing, cyanosis  Neuro: Does not respond to voice, does react to painful stimuli  Skin: Warm and dry    Assessment/Plan  72 M BPH CAD, HTN, HLD, Afib, SDH s/p mechanical fall, recurrent ICH when on AC brought today for weakness. History obtained from pt's wife Love and chart review given patient medical condition. Pt admitted from 10/19 to 10/22/2022 at [facility] for SHD s/p adexxa. Pt's wife endorses that patient has been progressively deteriorating, states that now only is reactive to painful stimulus, prior used to articulate some words. Patient admitted for acute on chronic AMS.    - Pt appears to be at baseline mental status, reacts to painful stimuli however refusing to open eyes  - Pupils reactive to light  - Give 1x Ofirmev now  - Cooling blanket  - STAT Lactate ordered  - STAT repeat Blood cx x2, UA, urine cx ordered  - Start on 2L NC and titrate as needed  - Hemodynamically stable currently  - Monitor resp status  - Will continue to monitor, RN to call if any changes.    Addendum   Called by RN for BP of 91/58   - Will administer 500cc Bolus over 30 min

## 2022-11-24 NOTE — PROGRESS NOTE ADULT - PROBLEM SELECTOR PLAN 1
Fever- Sepsis with Leukocytosis, Hypotension   Suspect  possible Aspiration PNA   Blood  c/s x 2 , UA, Urine c/s   --Aspiration precaution  -Started on Zosyn IVPB q 8 hrs   -Tylenol 650 mg ID  Suppository   -IVF NS 60  ml /hr after Bolus NS   -ID Dr Boggs d/w   -CXR Rt L L infiltrate?   CBC in AM  -  NPO, S&S follow up

## 2022-11-24 NOTE — DIETITIAN INITIAL EVALUATION ADULT - PERTINENT MEDS FT
MEDICATIONS  (STANDING):  atorvastatin 40 milliGRAM(s) Oral at bedtime  dextrose 5% + sodium chloride 0.45%. 1000 milliLiter(s) (500 mL/Hr) IV Continuous <Continuous>  dextrose 5%. 1000 milliLiter(s) (100 mL/Hr) IV Continuous <Continuous>  dextrose 5%. 1000 milliLiter(s) (50 mL/Hr) IV Continuous <Continuous>  dextrose 50% Injectable 25 Gram(s) IV Push once  dextrose 50% Injectable 12.5 Gram(s) IV Push once  dextrose 50% Injectable 25 Gram(s) IV Push once  diltiazem    milliGRAM(s) Oral daily  gabapentin 400 milliGRAM(s) Oral three times a day  glucagon  Injectable 1 milliGRAM(s) IntraMuscular once  hydrALAZINE 50 milliGRAM(s) Oral every 8 hours  insulin lispro (ADMELOG) corrective regimen sliding scale   SubCutaneous every 6 hours  latanoprost 0.005% Ophthalmic Solution 1 Drop(s) Both EYES at bedtime  losartan 50 milliGRAM(s) Oral two times a day  methylphenidate 20 milliGRAM(s) Oral daily  methylphenidate 10 milliGRAM(s) Oral daily  metoprolol tartrate Injectable 5 milliGRAM(s) IV Push every 6 hours  mirtazapine 7.5 milliGRAM(s) Oral daily  sodium chloride 0.9%. 1000 milliLiter(s) (60 mL/Hr) IV Continuous <Continuous>    MEDICATIONS  (PRN):  acetaminophen     Tablet .. 650 milliGRAM(s) Oral every 6 hours PRN Temp greater or equal to 38C (100.4F), Mild Pain (1 - 3)  aluminum hydroxide/magnesium hydroxide/simethicone Suspension 30 milliLiter(s) Oral every 4 hours PRN Dyspepsia  dextrose Oral Gel 15 Gram(s) Oral once PRN Blood Glucose LESS THAN 70 milliGRAM(s)/deciliter  ondansetron Injectable 4 milliGRAM(s) IV Push every 8 hours PRN Nausea and/or Vomiting

## 2022-11-24 NOTE — PROVIDER CONTACT NOTE (CHANGE IN STATUS NOTIFICATION) - ASSESSMENT
Patient not responding, warm to touch. Rectal temp 103.8, /70  oxygen level 89% RA RR 24. Blood sugar 169. Patient producing adequate amount of urine, saturated diaper. Dr Toth notified.

## 2022-11-24 NOTE — PROGRESS NOTE ADULT - SUBJECTIVE AND OBJECTIVE BOX
Neurology Follow up note    MELBA [id number]    HPI:  72M BPH CAD, HTN, HLD, Afib, SDH s/p mechanical fall, recurrent ICH when on AC brought today for weakness. History obtained from pt's wife Love and chart review given patient medical condition. Pt admitted from 10/19 to 10/22/2022 at [facility] for SHD s/p adexxa. Pt's wife endorses that patient has been progressively deteriorating, states that now only is reactive to painful stimulus, prior used to articulate some words. Patient completed 2 weeks of outpatient PT, has 8-10 hours nurse aid daily. Temp at home 99.9. Wife assist with feeding, was seen by neuro who start mirtazapine and sent for neurologic work up.     ED course  VS: Afebrile, HR: 96, BP: 154/97, SpO2: 97% RA RR 19  Labs significant for glucose 227, Alkaline phosphate 227, UA shows glucose 250  CT head showed chronic subdural hematomas overlying the left side of the posterior falx measuring up to 1.7 cm and 1.5 cm overlying the left temporal convexity.  Mild mass effect with sulcal effacement and partial compression of the  left lateral ventricle. No new acute intracranial hemorrhage.  EKG shows Afib with RVR @ 110 bpm, Right axis deviation    (2022 01:11)      Interval History -sleepy today    Patient is seen, chart was reviewed and case was discussed with the treatment team.  Pt is not in any distress.   Lying on bed comfortably.       Vital Signs Last 24 Hrs  T(C): 37.2 (2022 08:15), Max: 39.9 (2022 05:45)  T(F): 99 (2022 08:15), Max: 103.8 (2022 05:45)  HR: 98 (2022 08:15) (70 - 120)  BP: 94/62 (2022 08:15) (91/58 - 178/95)  BP(mean): --  RR: 20 (2022 08:15) (18 - 24)  SpO2: 94% (2022 08:15) (89% - 96%)    Parameters below as of 2022 08:15  Patient On (Oxygen Delivery Method): nasal cannula  O2 Flow (L/min): 2          REVIEW OF SYSTEMS:    unobtainable 2/2 poor mentation  not in any distress    On Neurological Examination:    Mental Status - Pt is unresponsive to verbal stimuli   moaning and grimacing with tactile stimulation       Speech -  Non verbal    Cranial Nerves - Pupils 3 mm equal and reactive to light, extraocular eye movements intact.   Pt has no facial asymmetry. Facial sensation is intact.Tongue - is in midline.    Muscle tone -increased    Motor Exam - 2/5    Sensory Exam -  Pt withdraws all extremities equally on stimulation.      Deep tendon Reflexes - 2 plus all over.    Neck Supple -  Yes.     MEDICATIONS    acetaminophen     Tablet .. 650 milliGRAM(s) Oral every 6 hours PRN  aluminum hydroxide/magnesium hydroxide/simethicone Suspension 30 milliLiter(s) Oral every 4 hours PRN  atorvastatin 40 milliGRAM(s) Oral at bedtime  dextrose 5% + sodium chloride 0.45%. 1000 milliLiter(s) IV Continuous <Continuous>  dextrose 5%. 1000 milliLiter(s) IV Continuous <Continuous>  dextrose 5%. 1000 milliLiter(s) IV Continuous <Continuous>  dextrose 50% Injectable 25 Gram(s) IV Push once  dextrose 50% Injectable 12.5 Gram(s) IV Push once  dextrose 50% Injectable 25 Gram(s) IV Push once  dextrose Oral Gel 15 Gram(s) Oral once PRN  diltiazem    milliGRAM(s) Oral daily  gabapentin 400 milliGRAM(s) Oral three times a day  glucagon  Injectable 1 milliGRAM(s) IntraMuscular once  hydrALAZINE 50 milliGRAM(s) Oral every 8 hours  insulin lispro (ADMELOG) corrective regimen sliding scale   SubCutaneous every 6 hours  latanoprost 0.005% Ophthalmic Solution 1 Drop(s) Both EYES at bedtime  losartan 50 milliGRAM(s) Oral two times a day  methylphenidate 20 milliGRAM(s) Oral daily  methylphenidate 10 milliGRAM(s) Oral daily  metoprolol tartrate Injectable 5 milliGRAM(s) IV Push every 6 hours  mirtazapine 7.5 milliGRAM(s) Oral daily  ondansetron Injectable 4 milliGRAM(s) IV Push every 8 hours PRN  sodium chloride 0.9%. 1000 milliLiter(s) IV Continuous <Continuous>      Allergies    No Known Allergies    Intolerances        LABS:  CBC Full  -  ( 2022 06:37 )  WBC Count : 25.02 K/uL  RBC Count : 4.65 M/uL  Hemoglobin : 12.2 g/dL  Hematocrit : 36.7 %  Platelet Count - Automated : 247 K/uL  Mean Cell Volume : 78.9 fl  Mean Cell Hemoglobin : 26.2 pg  Mean Cell Hemoglobin Concentration : 33.2 gm/dL  x    Urinalysis Basic - ( 2022 22:10 )    Color: Yellow / Appearance: Clear / S.015 / pH: x  Gluc: x / Ketone: Negative  / Bili: Negative / Urobili: Negative   Blood: x / Protein: 30 mg/dL / Nitrite: Negative   Leuk Esterase: Negative / RBC: 0-2 /HPF / WBC 0-2   Sq Epi: x / Non Sq Epi: Few / Bacteria: Few      11-24    143  |  110<H>  |  10  ----------------------------<  184<H>  3.2<L>   |  24  |  0.97    Ca    8.3<L>      2022 06:37  Phos  2.3     11-24  Mg     1.8     11-24    TPro  5.2<L>  /  Alb  2.3<L>  /  TBili  0.7  /  DBili  x   /  AST  11<L>  /  ALT  15  /  AlkPhos  87  11-24    Hemoglobin A1C:     Vitamin B12     RADIOLOGY    ASSESSMENT AND PLAN:    seen for ams-multifactorial     ME  TBI/SDH  FEVER    SEPSIS WORK UP  CONSIDER DC REMERON  Physical therapy evaluation.  OOB to chair/ambulation with assistance only  Pain is accessed and addressed.  Would continue to follow.

## 2022-11-24 NOTE — PROVIDER CONTACT NOTE (CHANGE IN STATUS NOTIFICATION) - ACTION/TREATMENT ORDERED:
Patient placed on oxygen 2L NC. Tylenol iv administered. Patient placed on a cooling blanket. Blood collected. Patient straight cath for urine, no urine in the bladder. Will attempt once again in an hr. 0650 Temperature down to 101.6 BP 91/58  RR 20 Oxygen level 96% O22L. Patient responsive to pain, more awake. Dr Toth notified of low BP, awaiting further orders.

## 2022-11-24 NOTE — DIETITIAN INITIAL EVALUATION ADULT - PERTINENT LABORATORY DATA
11-24    143  |  110<H>  |  10  ----------------------------<  184<H>  3.2<L>   |  24  |  0.97    Ca    8.3<L>      24 Nov 2022 06:37  Phos  2.3     11-24  Mg     1.8     11-24    TPro  5.2<L>  /  Alb  2.3<L>  /  TBili  0.7  /  DBili  x   /  AST  11<L>  /  ALT  15  /  AlkPhos  87  11-24  POCT Blood Glucose.: 202 mg/dL (11-24-22 @ 07:42)  A1C with Estimated Average Glucose Result: 9.3 % (11-23-22 @ 05:29)  A1C with Estimated Average Glucose Result: 7.8 % (10-20-22 @ 02:22)  A1C with Estimated Average Glucose Result: 6.9 % (06-15-22 @ 10:52)

## 2022-11-24 NOTE — DIETITIAN INITIAL EVALUATION ADULT - SIGNS/SYMPTOMS
as evidenced by 10lb(6.89%) wt loss x 4 mos, mild fluid accumulation.  as evidenced by lethargy/change in MS, SLP unable to evaluate with hx dysphagia.

## 2022-11-24 NOTE — PROGRESS NOTE ADULT - PROBLEM SELECTOR PLAN 2
Acute on chronic, per wife pt has been progressively deteriorating, reactive to painful stimulus only, prior used to articulate some words.  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  Mild mass effect with sulcal effacement and partial compression of the  left lateral ventricle. No new acute intracranial hemorrhage.  - Neuro Dr. Main consulted   - GI Dr. Kelly -NO PEG as per Family   - Fall risk protocol, OOB with assistance  - PT /OT consulted  - Palliative care consulted for GOC-DNR/DNI

## 2022-11-24 NOTE — PROGRESS NOTE ADULT - SUBJECTIVE AND OBJECTIVE BOX
[facility]  Lorenzo Adam PA-C  [address] [place] [address]  [phone]      INTERVAL HPI/OVERNIGHT EVENTS:  Pt s/e  Palliative eval noted    MEDICATIONS  (STANDING):  atorvastatin 40 milliGRAM(s) Oral at bedtime  dextrose 5% + sodium chloride 0.45%. 1000 milliLiter(s) (500 mL/Hr) IV Continuous <Continuous>  dextrose 5%. 1000 milliLiter(s) (100 mL/Hr) IV Continuous <Continuous>  dextrose 5%. 1000 milliLiter(s) (50 mL/Hr) IV Continuous <Continuous>  dextrose 50% Injectable 25 Gram(s) IV Push once  dextrose 50% Injectable 12.5 Gram(s) IV Push once  dextrose 50% Injectable 25 Gram(s) IV Push once  diltiazem    milliGRAM(s) Oral daily  gabapentin 400 milliGRAM(s) Oral three times a day  glucagon  Injectable 1 milliGRAM(s) IntraMuscular once  hydrALAZINE 50 milliGRAM(s) Oral every 8 hours  insulin lispro (ADMELOG) corrective regimen sliding scale   SubCutaneous every 6 hours  latanoprost 0.005% Ophthalmic Solution 1 Drop(s) Both EYES at bedtime  losartan 50 milliGRAM(s) Oral two times a day  methylphenidate 20 milliGRAM(s) Oral daily  methylphenidate 10 milliGRAM(s) Oral daily  metoprolol tartrate Injectable 5 milliGRAM(s) IV Push every 6 hours  mirtazapine 7.5 milliGRAM(s) Oral daily  sodium chloride 0.9%. 1000 milliLiter(s) (60 mL/Hr) IV Continuous <Continuous>    MEDICATIONS  (PRN):  acetaminophen     Tablet .. 650 milliGRAM(s) Oral every 6 hours PRN Temp greater or equal to 38C (100.4F), Mild Pain (1 - 3)  aluminum hydroxide/magnesium hydroxide/simethicone Suspension 30 milliLiter(s) Oral every 4 hours PRN Dyspepsia  dextrose Oral Gel 15 Gram(s) Oral once PRN Blood Glucose LESS THAN 70 milliGRAM(s)/deciliter  ondansetron Injectable 4 milliGRAM(s) IV Push every 8 hours PRN Nausea and/or Vomiting      Allergies    No Known Allergies      PHYSICAL EXAM:   Vital Signs:  Vital Signs Last 24 Hrs  T(C): 37.2 (2022 08:15), Max: 39.9 (2022 05:45)  T(F): 99 (2022 08:15), Max: 103.8 (2022 05:45)  HR: 98 (2022 08:15) (70 - 120)  BP: 94/62 (2022 08:15) (91/58 - 178/95)  BP(mean): --  RR: 20 (2022 08:15) (18 - 24)  SpO2: 94% (2022 08:15) (89% - 96%)    Parameters below as of 2022 08:15  Patient On (Oxygen Delivery Method): nasal cannula  O2 Flow (L/min): 2    GENERAL:  Appears stated age  ABDOMEN:  Soft, non-tender, non-distended  NEURO:  Alert    LABS:                        12.2   25.02 )-----------( 247      ( 2022 06:37 )             36.7     11-24    143  |  110<H>  |  10  ----------------------------<  184<H>  3.2<L>   |  24  |  0.97    Ca    8.3<L>      2022 06:37  Phos  2.3     11-24  Mg     1.8     11-24    TPro  5.2<L>  /  Alb  2.3<L>  /  TBili  0.7  /  DBili  x   /  AST  11<L>  /  ALT  15  /  AlkPhos  87  11-24    PT/INR - ( 2022 22:10 )   PT: 12.7 sec;   INR: 1.08 ratio         PTT - ( 2022 22:10 )  PTT:34.9 sec  Urinalysis Basic - ( 2022 08:26 )    Color: Yellow / Appearance: Slightly Turbid / S.010 / pH: x  Gluc: x / Ketone: Small  / Bili: Negative / Urobili: 1   Blood: x / Protein: 100 / Nitrite: Negative   Leuk Esterase: Moderate / RBC: >50 /HPF / WBC >50   Sq Epi: x / Non Sq Epi: Few / Bacteria: Few

## 2022-11-24 NOTE — PROGRESS NOTE ADULT - PROBLEM SELECTOR PLAN 6
Chronic, on ritalin 20 mg @ 8 am and 10 mg 13:00, will continue   - Fall and aspiration precautions   - Change position every 2 hours   - PT /OT consulted

## 2022-11-24 NOTE — CONSULT NOTE ADULT - SUBJECTIVE AND OBJECTIVE BOX
[facility], Division of Infectious Diseases  LADONNA Ramirez S. Shah, Y. Patel, G. Harrison   [phone]  after hours and weekends [phone]    MELBA PARMAR  72y, Male      HPI: history per wife at bedside and chart   72M BPH CAD, HTN, HLD, Afib, SDH s/p mechanical fall, recurrent ICH when on AC brought today for weakness.   states he just hasnt been himself  no fever, no diarrhea, no vomiting   today she thinks hes more alert   ID called for fever      Pt admitted from 10/19 to 10/22/2022 at [facility] for SHD s/p adexxa.    PMH/PSH--  TBI (traumatic brain injury)  HTN (hypertension)  Atrial fibrillation  DM (diabetes mellitus)  Peripheral neuropathy  Major depression  HLD (hyperlipidemia)  CAD (coronary artery disease)  BPH (benign prostatic hyperplasia)  Pneumonia due to COVID-19 virus  Hemorrhage in the brain        Allergies--  nkda    Medications--  Antibiotics: piperacillin/tazobactam IVPB.- 3.375 Gram(s) IV Intermittent once  piperacillin/tazobactam IVPB.. 3.375 Gram(s) IV Intermittent every 8 hours    Immunologic:   Other: acetaminophen     Tablet .. PRN  aluminum hydroxide/magnesium hydroxide/simethicone Suspension PRN  dextrose 5% + sodium chloride 0.45%.  dextrose 5%.  dextrose 5%.  dextrose 50% Injectable  dextrose 50% Injectable  dextrose 50% Injectable  dextrose Oral Gel PRN  gabapentin  glucagon  Injectable  insulin lispro (ADMELOG) corrective regimen sliding scale  latanoprost 0.005% Ophthalmic Solution  methylphenidate  methylphenidate  metoprolol tartrate Injectable  mirtazapine  ondansetron Injectable PRN  potassium chloride  10 mEq/100 mL IVPB  sodium chloride 0.9%.      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  HEENT: AT/NC.  Neck: Not rigid. No sense of mass.  Nodes: None palpable.  Lungs: Clear bilaterally without rales, wheezing or rhonchi  Heart: Regular rate and rhythm.   Abdomen: Bowel sounds present and normoactive. Soft. Nondistended. + tender.   Extremities: No cyanosis or clubbing. No edema.   Skin: Warm. Dry. Good turgor. No rash. No vasculitic stigmata.        Laboratory & Imaging Data--  CBC                        12.2   25.02 )-----------( 247      ( 24 Nov 2022 06:37 )             36.7       Chemistries  11-24    143  |  110<H>  |  10  ----------------------------<  184<H>  3.2<L>   |  24  |  0.97    Ca    8.3<L>      24 Nov 2022 06:37  Phos  2.3     11-24  Mg     1.8     11-24    TPro  5.2<L>  /  Alb  2.3<L>  /  TBili  0.7  /  DBili  x   /  AST  11<L>  /  ALT  15  /  AlkPhos  87  11-24      Culture Data    Culture - Urine (collected 22 Nov 2022 22:10)  Source: Catheterized Catheterized  Final Report (24 Nov 2022 07:27):    <10,000 CFU/mL Normal Urogenital Angle    Culture - Blood (collected 22 Nov 2022 22:10)  Source: .Blood Blood-Venous  Preliminary Report (24 Nov 2022 02:03):    No growth to date.    Culture - Blood (collected 22 Nov 2022 22:05)  Source: .Blood Blood-Venous  Preliminary Report (24 Nov 2022 02:03):    No growth to date.      < from: CT Head No Cont (11.22.22 @ 22:28) >  left lateral ventricle.    Redemonstration of encephalomalacia and gliosis of the right frontal lobe   and right temporal lobe.    No acute intracranial hemorrhage or midline shift.  No CT evidence of acute large vascular territory infarct.  The ventricles and cortical sulci are prominent reflecting parenchymal   volume loss.  Patchy hypodensities in the periventricular white matter are nonspecific,   but likely sequela of small vessel ischemic disease.    Mild mucosal thickening of the bilateral axillary sinuses. The mastoid   air cells are well aerated.  No displaced calvarial fracture.    IMPRESSION:  Chronic subdural hematomas overlying the left side of the posterior falx   measuring up to 1.7 cm and 1.5 cm overlying the left temporal convexity.    No new acute intracranial hemorrhage.    --- End of Report ---    < end of copied text >  < from: Xray Chest 1 View- PORTABLE-Urgent (Xray Chest 1 View- PORTABLE-Urgent .) (11.24.22 @ 13:02) >    ACC: [id number] EXAM:  XR CHEST PORTABLE URGENT 1V                          PROCEDURE DATE:  11/24/2022          INTERPRETATION:  AP semierect chest on November 24, 2022 at 12:46 PM.   Patient has fever.    Heart magnified by technique.    There is aright lower perihilar infiltrate in scattered fashion which is   new since November 22.    IMPRESSION: Right-sided infiltrate presently seen.    --- End of Report ---    < end of copied text >            
[facility]  Lorenzo Adam PA-C  [address] [place] [address] [id number] [phone]      Chief Complaint:  Patient is a 72y old  Male who presents with a chief complaint of acute on chronic AMS (2022 01:11)      HPI: 72M BPH CAD, HTN, HLD, Afib, SDH s/p mechanical fall, recurrent ICH when on AC brought today for weakness. History obtained from pt's wife Love and chart review given patient medical condition. Pt admitted from 10/19 to 10/22/2022 at [facility] for SHD s/p adexxa. Pt's wife endorses that patient has been progressively deteriorating, states that now only is reactive to painful stimulus, prior used to articulate some words. Patient completed 2 weeks of outpatient PT, has 8-10 hours nurse aid daily. Temp at home 99.9. Wife assist with feeding, was seen by neuro who start mirtazapine and sent for neurologic work up.     Allergies:  No Known Allergies      Medications:  acetaminophen     Tablet .. 650 milliGRAM(s) Oral every 6 hours PRN  aluminum hydroxide/magnesium hydroxide/simethicone Suspension 30 milliLiter(s) Oral every 4 hours PRN  atorvastatin 40 milliGRAM(s) Oral at bedtime  dextrose 5% + sodium chloride 0.45%. 1000 milliLiter(s) IV Continuous <Continuous>  dextrose 5%. 1000 milliLiter(s) IV Continuous <Continuous>  dextrose 5%. 1000 milliLiter(s) IV Continuous <Continuous>  dextrose 50% Injectable 25 Gram(s) IV Push once  dextrose 50% Injectable 12.5 Gram(s) IV Push once  dextrose 50% Injectable 25 Gram(s) IV Push once  dextrose Oral Gel 15 Gram(s) Oral once PRN  diltiazem    milliGRAM(s) Oral daily  gabapentin 400 milliGRAM(s) Oral three times a day  glucagon  Injectable 1 milliGRAM(s) IntraMuscular once  hydrALAZINE 50 milliGRAM(s) Oral every 8 hours  insulin lispro (ADMELOG) corrective regimen sliding scale   SubCutaneous three times a day before meals  insulin lispro (ADMELOG) corrective regimen sliding scale   SubCutaneous at bedtime  latanoprost 0.005% Ophthalmic Solution 1 Drop(s) Both EYES at bedtime  losartan 50 milliGRAM(s) Oral two times a day  methylphenidate 20 milliGRAM(s) Oral daily  methylphenidate 10 milliGRAM(s) Oral daily  metoprolol tartrate Injectable 5 milliGRAM(s) IV Push every 6 hours  mirtazapine 7.5 milliGRAM(s) Oral daily  ondansetron Injectable 4 milliGRAM(s) IV Push every 8 hours PRN  sodium chloride 0.9%. 1000 milliLiter(s) IV Continuous <Continuous>      PMHX/PSHX:  TBI (traumatic brain injury)    HTN (hypertension)    Atrial fibrillation    DM (diabetes mellitus)    Peripheral neuropathy    Major depression    HLD (hyperlipidemia)    CAD (coronary artery disease)    BPH (benign prostatic hyperplasia)    Pneumonia due to COVID-19 virus    Hemorrhage in the brain    No significant past surgical history    No significant past surgical history        Family history:  No pertinent family history in first degree relatives        Social History:     ROS:     unable to obtain        PHYSICAL EXAM:   Vital Signs:  Vital Signs Last 24 Hrs  T(C): 36.5 (2022 12:59), Max: 37.2 (2022 04:04)  T(F): 97.7 (2022 12:59), Max: 98.9 (2022 04:04)  HR: 71 (2022 12:59) (71 - 111)  BP: 140/98 (2022 12:59) (140/98 - 154/97)  BP(mean): --  RR: 18 (2022 12:59) (18 - 19)  SpO2: 94% (2022 12:59) (94% - 97%)    Parameters below as of 2022 12:59  Patient On (Oxygen Delivery Method): room air      Daily Height in cm: 165.1 (2022 01:11)    Daily     nad  confused  frail  non toxic  soft, nt  no edema      LABS:                        12.6   8.13  )-----------( 232      ( 2022 05:29 )             38.8     11-    142  |  108  |  9   ----------------------------<  221<H>  3.7   |  30  |  0.88    Ca    8.7      2022 05:29  Phos  2.7     11-    TPro  6.6  /  Alb  3.0<L>  /  TBili  0.5  /  DBili  x   /  AST  13<L>  /  ALT  26  /  AlkPhos  123<H>      LIVER FUNCTIONS - ( 2022 22:10 )  Alb: 3.0 g/dL / Pro: 6.6 g/dL / ALK PHOS: 123 U/L / ALT: 26 U/L / AST: 13 U/L / GGT: x           PT/INR - ( 2022 22:10 )   PT: 12.7 sec;   INR: 1.08 ratio         PTT - ( 2022 22:10 )  PTT:34.9 sec  Urinalysis Basic - ( 2022 22:10 )    Color: Yellow / Appearance: Clear / S.015 / pH: x  Gluc: x / Ketone: Negative  / Bili: Negative / Urobili: Negative   Blood: x / Protein: 30 mg/dL / Nitrite: Negative   Leuk Esterase: Negative / RBC: 0-2 /HPF / WBC 0-2   Sq Epi: x / Non Sq Epi: Few / Bacteria: Few          Imaging:

## 2022-11-24 NOTE — PROGRESS NOTE ADULT - PROBLEM SELECTOR PLAN 3
Pt admitted from 10/19 to 10/22/2022 at [facility] for SHD s/p adexxa. Off of AC.   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  Mild mass effect with sulcal effacement and partial compression of the  left lateral ventricle. No new acute intracranial hemorrhage.  - Neuro Dr. Main follow up

## 2022-11-24 NOTE — PROGRESS NOTE ADULT - ATTENDING COMMENTS
72 M BPH CAD, HTN, HLD, Afib, SDH s/p mechanical fall, recurrent ICH when on AC brought today for weakness. History obtained from pt's wife Love and chart review given patient medical condition. Pt admitted from 10/19 to 10/22/2022 at [facility] for SHD s/p adexxa. Pt's wife endorses that patient has been progressively deteriorating, states that now only is reactive to painful stimulus, prior used to articulate some words. Patient admitted for acute on chronic AMS.  Pt seen, examined, case & care plan d/w pt, residents at detail.  Neurology-DR Main follow up   -ID DR giles  -GI-DR Kaye   Palliative Care Eval -DNR/DNI   D/W Wife at [facility] at bed side,   - NPO, IV Fluids, awaiting S & S Eval.   - NPO for aspiration Precaution.  SCD   PT/OT  Eval   Social service Eval.  Total care time 45 minutes.

## 2022-11-24 NOTE — PROGRESS NOTE ADULT - SUBJECTIVE AND OBJECTIVE BOX
Patient is a 72y old  Male who presents with a chief complaint of acute on chronic AMS (2022 14:59)    HPI:  72M BPH CAD, HTN, HLD, Afib, SDH s/p mechanical fall, recurrent ICH when on AC brought today for weakness. History obtained from pt's wife Love and chart review given patient medical condition. Pt admitted from 10/19 to 10/22/2022 at [facility] for SHD s/p adexxa. Pt's wife endorses that patient has been progressively deteriorating, states that now only is reactive to painful stimulus, prior used to articulate some words. Patient completed 2 weeks of outpatient PT, has 8-10 hours nurse aid daily. Temp at home 99.9. Wife assist with feeding, was seen by neuro who start mirtazapine and sent for neurologic work up.     ED course  VS: Afebrile, HR: 96, BP: 154/97, SpO2: 97% RA RR 19  Labs significant for glucose 227, Alkaline phosphate 227, UA shows glucose 250  CT head showed chronic subdural hematomas overlying the left side of the posterior falx measuring up to 1.7 cm and 1.5 cm overlying the left temporal convexity.  Mild mass effect with sulcal effacement and partial compression of the  left lateral ventricle. No new acute intracranial hemorrhage.  EKG shows Afib with RVR @ 110 bpm, Right axis deviation    (2022 01:11)    INTERVAL HPI:  22: pt seen, examined , Pt had fever 103.8 & 101 early this AM , Afebrile Now, ON IV fluids, IV Bolus given, Leukocytosis ,Started on IV Abx Zosyn, wife at bedside, ID  Dr Boggs called , Hypokalemia - IV K replaced.      OVERNIGHT EVENTS: Fever this AM     Home Medications:  gabapentin 400 mg oral capsule: 1 cap(s) orally 3 times a day (2022 02:49)  hydrALAZINE 25 mg oral tablet: 2 tab(s) orally every 8 hours (2022 02:49)  latanoprost 0.005% ophthalmic solution: 1 drop(s) to each affected eye once a day (in the evening) (2022 02:49)  losartan 25 mg oral tablet: 2 tab(s) orally 2 times a day (2022 02:49)  methylphenidate 10 mg oral tablet: 2 pills at 8AM; 1 pill at 1PM (2022 02:49)  mirtazapine 7.5 mg oral tablet: 1 tab(s) orally once a day (at bedtime) (2022 02:49)  rosuvastatin 10 mg oral tablet: 1 tab(s) orally once a day (2022 02:49)      MEDICATIONS  (STANDING):  dextrose 5% + sodium chloride 0.45%. 1000 milliLiter(s) (500 mL/Hr) IV Continuous <Continuous>  dextrose 5%. 1000 milliLiter(s) (100 mL/Hr) IV Continuous <Continuous>  dextrose 5%. 1000 milliLiter(s) (50 mL/Hr) IV Continuous <Continuous>  dextrose 50% Injectable 25 Gram(s) IV Push once  dextrose 50% Injectable 12.5 Gram(s) IV Push once  dextrose 50% Injectable 25 Gram(s) IV Push once  gabapentin 400 milliGRAM(s) Oral three times a day  glucagon  Injectable 1 milliGRAM(s) IntraMuscular once  insulin lispro (ADMELOG) corrective regimen sliding scale   SubCutaneous every 6 hours  latanoprost 0.005% Ophthalmic Solution 1 Drop(s) Both EYES at bedtime  methylphenidate 20 milliGRAM(s) Oral daily  methylphenidate 10 milliGRAM(s) Oral daily  metoprolol tartrate Injectable 5 milliGRAM(s) IV Push every 6 hours  mirtazapine 7.5 milliGRAM(s) Oral daily  piperacillin/tazobactam IVPB.- 3.375 Gram(s) IV Intermittent once  piperacillin/tazobactam IVPB.. 3.375 Gram(s) IV Intermittent every 8 hours  potassium chloride  10 mEq/100 mL IVPB 10 milliEquivalent(s) IV Intermittent every 1 hour  sodium chloride 0.9%. 1000 milliLiter(s) (60 mL/Hr) IV Continuous <Continuous>    MEDICATIONS  (PRN):  acetaminophen     Tablet .. 650 milliGRAM(s) Oral every 6 hours PRN Temp greater or equal to 38C (100.4F), Mild Pain (1 - 3)  aluminum hydroxide/magnesium hydroxide/simethicone Suspension 30 milliLiter(s) Oral every 4 hours PRN Dyspepsia  dextrose Oral Gel 15 Gram(s) Oral once PRN Blood Glucose LESS THAN 70 milliGRAM(s)/deciliter  ondansetron Injectable 4 milliGRAM(s) IV Push every 8 hours PRN Nausea and/or Vomiting      Allergies    No Known Allergies    Intolerances        Social History:  Tobacco: denies   EtOH: denies   Recreational drug use: denies   Lives with: wife   Ambulates: bedbound, has a nurse aid 8-10 daily   Vaccinations: Fully COVID-19 vaccinated (2022 01:11)      REVIEW OF SYSTEMS: pt is awake, mumble  ROS Unable to obtain due to - [  ] Dementia  [  ] Lethargy [x  ] Drowsy [  ] Sedated [  ] non verbal  REST OF REVIEW Of SYSTEM - [  ] Normal     Vital Signs Last 24 Hrs  T(C): 36.8 (2022 13:38), Max: 39.9 (2022 05:45)  T(F): 98.2 (2022 13:38), Max: 103.8 (2022 05:45)  HR: 90 (2022 13:38) (70 - 120)  BP: 116/66 (2022 13:38) (91/58 - 178/95)  BP(mean): --  RR: 20 (2022 13:38) (18 - 24)  SpO2: 94% (2022 08:15) (89% - 96%)    Parameters below as of 2022 13:38  Patient On (Oxygen Delivery Method): nasal cannula  O2 Flow (L/min): 2    Finger Stick      PHYSICAL EXAM:  GENERAL:  [ x ] NAD , [ x ] well appearing, [  ] Agitated, [x  ] Drowsy,  [x  ] Lethargy, [  ] confused   HEAD:  [ x ] Normal, [  ] Other  EYES:  [x  ] EOMI, [ x ] PERRLA, [x  ] conjunctiva and sclera clear normal, [  ] Other,  [ x ] Pallor,[  ] Discharge  ENMT:  [ x ] Normal, [x  ] Dry  mucous membranes, [ x ] Good dentition, [ x ] No Thrush  NECK:  [ x ] Supple, [ x ] No JVD, [ x ] Normal thyroid, [  ] Lymphadenopathy [  ] Other  CHEST/LUNG:  [ x ] Clear to auscultation bilaterally, [ x ] Breath Sounds equal B/L / Decrease, [x  ] poor effort  [ x ] No rales, [ x ] No rhonchi  [x  ]  No wheezing,   HEART:  [ x ] Regular rate and rhythm, [  ] tachycardia, [  ] Bradycardia,  [  ] irregular  [ x ] No murmurs, No rubs, No gallops, [  ] PPM in place (Mfr:  )  ABDOMEN:  [ x ] Soft, [x  ] Nontender, [ x ] Nondistended, [ x ] No mass, [x  ] Bowel sounds present, [x  ] obese  NERVOUS SYSTEM:  [  ] Alert & Oriented x , [ x ] Nonfocal  [  ] Confusion  [  ] Encephalopathic [  ] Sedated [  ] Unable to assess, [  ] Dementia [ x ] Other- awake, does NOT follow all direction  EXTREMITIES: [ x ] 2+ Peripheral Pulses, No clubbing, No cyanosis,  [  ] edema B/L lower EXT. [ x ] PVD stasis skin changes B/L Lower EXT, [  ] wound  LYMPH: No lymphadenopathy noted  SKIN:  [  ] No rashes or lesions, [  ] Pressure Ulcers, [  ] ecchymosis, [  ] Skin Tears, [x  ] Other- dry skin, with Scabs all EXT    DIET: Diet, NPO:   Except Medications (22 @ 02:11)      LABS:                        12.2   25.02 )-----------( 247      ( 2022 06:37 )             36.7     2022 06:37    143    |  110    |  10     ----------------------------<  184    3.2     |  24     |  0.97     Ca    8.3        2022 06:37  Phos  2.3       2022 06:37  Mg     1.8       2022 06:37    TPro  5.2    /  Alb  2.3    /  TBili  0.7    /  DBili  x      /  AST  11     /  ALT  15     /  AlkPhos  87     2022 06:37    PT/INR - ( 2022 22:10 )   PT: 12.7 sec;   INR: 1.08 ratio         PTT - ( 2022 22:10 )  PTT:34.9 sec  Urinalysis Basic - ( 2022 08:26 )    Color: Yellow / Appearance: Slightly Turbid / S.010 / pH: x  Gluc: x / Ketone: Small  / Bili: Negative / Urobili: 1   Blood: x / Protein: 100 / Nitrite: Negative   Leuk Esterase: Moderate / RBC: >50 /HPF / WBC >50   Sq Epi: x / Non Sq Epi: Few / Bacteria: Few        Culture Results:   No growth to date. ( @ 22:10)  Culture Results:   <10,000 CFU/mL Normal Urogenital Angle ( @ 22:10)  Culture Results:   No growth to date. ( @ 22:05)      culture blood  -- .Blood Blood-Venous  @ 22:10    culture urine  --   @ 22:10  culture blood  -- .Blood Blood-Venous  @ 22:05    culture urine  --   @ 22:05    Culture - Urine (collected 2022 22:10)  Source: Catheterized Catheterized  Final Report (2022 07:27):    <10,000 CFU/mL Normal Urogenital Angle    Culture - Blood (collected 2022 22:10)  Source: .Blood Blood-Venous  Preliminary Report (2022 02:03):    No growth to date.    Culture - Blood (collected 2022 22:05)  Source: .Blood Blood-Venous  Preliminary Report (2022 02:03):    No growth to date.         Anemia Panel:  Vitamin B12, Serum: 656 pg/mL (22 @ 06:37)    RADIOLOGY & ADDITIONAL TESTS:  < from: Xray Chest 1 View- PORTABLE-Urgent (Xray Chest 1 View- PORTABLE-Urgent .) (22 @ 13:02) >    ACC: [id number] EXAM:  XR CHEST PORTABLE URGENT 1V                          PROCEDURE DATE:  2022          INTERPRETATION:  AP semierect chest on 2022 at 12:46 PM.   Patient has fever.    Heart magnified by technique.    There is aright lower perihilar infiltrate in scattered fashion which is   new since .    IMPRESSION: Right-sided infiltrate presently seen.    < end of copied text >  CXR      HEALTH ISSUES - PROBLEM Dx:  AMS (altered mental status)    H/O spontaneous intraparenchymal intracranial hemorrhage    Chronic atrial fibrillation    HTN (hypertension)    Mild fever    Motor system disease    History of diabetes mellitus    Peripheral neuropathy    Major depression    Need for prophylactic measure      Consultant(s) Notes Reviewed:  [x  ] YES     Care Discussed with [X] Consultants  [ x ] Patient  [x  ] Family - wife Marj [  ] HCP [  ]   [  ] Social Service  [ x ] RN, [  ] Physical Therapy,[  ] Palliative care team  DVT PPX: [  ] Lovenox, [  ] S C Heparin, [  ] Coumadin, [  ] Xarelto, [  ] Eliquis, [  ] Pradaxa, [  ] IV Heparin drip, [ x ] SCD [  ] Contraindication 2 to GI Bleed,[  ] Ambulation [  ] Contraindicated 2 to  bleed [x  ] Contraindicated 2 to Brain Bleed  Advanced directive: [  ] None, [x  ] DNR/DNI

## 2022-11-24 NOTE — DIETITIAN INITIAL EVALUATION ADULT - ADD RECOMMEND
Po diet per SLP recommendation. Glucerna oral nutritional supplement if able to tolerate po. GOC discussion. MVI with minerals daily. Electrolyte replacement prn.  Po diet per SLP recommendation vs pleasure feeds per MD order. Glucerna oral nutritional supplement if able to tolerate po. MVI with minerals daily. Electrolyte replacement prn.

## 2022-11-24 NOTE — CHART NOTE - NSCHARTNOTEFT_GEN_A_CORE
Called by RN for BP of 178/95 and HRs of 120 @ 0:06 and patient just received lopressor soon after. Nurse aware to repeat vitals in 30min - 1hr. Will continue to monitor.

## 2022-11-24 NOTE — PROGRESS NOTE ADULT - PROBLEM SELECTOR PLAN 7
Per wife patient has history of T2DM but resolved, not on meds.   - UA showing glucosuria   - HbA1c 9.3  - Low Insulin sliding scale  - Accu checks w/ hypoglycemic protocol  - Pt NPO for now

## 2022-11-24 NOTE — PROGRESS NOTE ADULT - PROBLEM SELECTOR PLAN 5
Chronic  - Hydralazine 50 mg q8h and losartan 50 q12h,  - PO meds HOLD   - START IV lopressor 5mg q6

## 2022-11-24 NOTE — DIETITIAN INITIAL EVALUATION ADULT - OTHER INFO
72 M BPH CAD, HTN, HLD, Afib, SDH s/p mechanical fall, recurrent ICH when on AC brought today for weakness. History obtained from pt's wife Love and chart review given patient medical condition. Pt admitted from 10/19 to 10/22/2022 at [facility] for SHD s/p adexxa. Pt's wife endorses that patient has been progressively deteriorating, states that now only is reactive to painful stimulus, prior used to articulate some words. Patient admitted for acute on chronic AMS.    Pt lethargic at time of visit. NPO x2 days, on NS@60cc/hr. S/p D5 1/2NS 500cc bolus. S/p SLP evaluation 11/23 however per SLP pt currently not appropriate for PO trials/swallow eval at this time. To remain available for follow up evaluation. S/p recent SLP evaluation (10/20/22) with recommendation for soft and bite sized diet with thin liquids. Wife assists with feeding pta. GI wdl, BM 11/24. Low K 3.2, phos 2.3 (11/24)-recommend supplementation. Current weight 135lbs. Past admission and seen by RD on 7/4/22. Weight at that time 142.8lbs with reported UBW 145lbs. ~10lb weight loss over past ~4months.  Hx DM, HgbA1c 9.3%. No meds pta. Per MOLST pt DNR/DNI.    72 M BPH CAD, HTN, HLD, Afib, SDH s/p mechanical fall, recurrent ICH when on AC brought today for weakness. History obtained from pt's wife Love and chart review given patient medical condition. Pt admitted from 10/19 to 10/22/2022 at [facility] for SHD s/p adexxa. Pt's wife endorses that patient has been progressively deteriorating, states that now only is reactive to painful stimulus, prior used to articulate some words. Patient admitted for acute on chronic AMS.    Pt lethargic at time of visit. NPO x2 days, on NS@60cc/hr. S/p D5 1/2NS 500cc bolus. S/p SLP evaluation 11/23 however per SLP pt currently not appropriate for PO trials/swallow eval at this time. To remain available for follow up evaluation. S/p recent SLP evaluation (10/20/22) with recommendation for soft and bite sized diet with thin liquids. Wife assists with feeding pta. GI wdl, BM 11/24. Low K 3.2, phos 2.3 (11/24)-recommend supplementation. Current weight 135lbs. Past admission and seen by RD on 7/4/22. Weight at that time 142.8lbs with reported UBW 145lbs. ~10lb weight loss over past ~4months.  Hx DM, HgbA1c 9.3%. No meds pta. Per MOLST pt DNR/DNI. Per GI consult, pts wife is not interested in peg. Considering pleasure feedings.    72 M BPH CAD, HTN, HLD, Afib, SDH s/p mechanical fall, recurrent ICH when on AC brought today for weakness. History obtained from pt's wife Love and chart review given patient medical condition. Pt admitted from 10/19 to 10/22/2022 at [facility] for SHD s/p adexxa. Pt's wife endorses that patient has been progressively deteriorating, states that now only is reactive to painful stimulus, prior used to articulate some words. Patient admitted for acute on chronic AMS.    Pt lethargic at time of visit. NPO x2 days, on NS@60cc/hr. S/p D5 1/2NS 500cc bolus 11/24. S/p SLP evaluation 11/23 however per SLP pt currently not appropriate for PO trials/swallow eval at this time. To remain available for follow up evaluation. S/p recent SLP evaluation (10/20/22) with recommendation for soft and bite sized diet with thin liquids. Wife assists with feedings pta. GI wdl, BM 11/24. Low K 3.2, phos 2.3 (11/24)-recommend supplementation. Current weight 135lbs. Past admission and seen by RD on 7/4/22. Weight at that time 142.8lbs with reported UBW 145lbs. ~10lb weight loss over past ~4months.  Hx DM, HgbA1c 9.3%. No meds pta. Per MOL[facility] pt DNR/DNI, TF not addressed. Per GI consult, pts wife is not interested in peg, considering pleasure feedings. Will remain available for po diet/oral nutritional supplements as medically feasible.

## 2022-11-24 NOTE — CONSULT NOTE ADULT - ASSESSMENT
dysphagia    i discussed nutrition with wife  he does not aspirate at home and has had pna very infrequently   monitor mental status  start pleasure feeds in am if MS improves  she is not interested in peg
72M BPH CAD, HTN, HLD, Afib, SDH s/p mechanical fall, recurrent ICH when on AC brought today for weakness.  fever /leukocytosis  sepsis aspiration pneumonia     plan  blood cx pending  ua with pyuria  follow cx  cxr right infiltrate    cont zosyn  aspiration precautions  trend wbc  monitor temp   check mrsa screen

## 2022-11-25 LAB
ANION GAP SERPL CALC-SCNC: 7 MMOL/L — SIGNIFICANT CHANGE UP (ref 5–17)
BUN SERPL-MCNC: 11 MG/DL — SIGNIFICANT CHANGE UP (ref 7–23)
CALCIUM SERPL-MCNC: 8 MG/DL — LOW (ref 8.5–10.1)
CHLORIDE SERPL-SCNC: 117 MMOL/L — HIGH (ref 96–108)
CO2 SERPL-SCNC: 22 MMOL/L — SIGNIFICANT CHANGE UP (ref 22–31)
CREAT SERPL-MCNC: 0.82 MG/DL — SIGNIFICANT CHANGE UP (ref 0.5–1.3)
EGFR: 94 ML/MIN/1.73M2 — SIGNIFICANT CHANGE UP
GLUCOSE SERPL-MCNC: 158 MG/DL — HIGH (ref 70–99)
HCT VFR BLD CALC: 36 % — LOW (ref 39–50)
HGB BLD-MCNC: 11.6 G/DL — LOW (ref 13–17)
MCHC RBC-ENTMCNC: 25.7 PG — LOW (ref 27–34)
MCHC RBC-ENTMCNC: 32.2 GM/DL — SIGNIFICANT CHANGE UP (ref 32–36)
MCV RBC AUTO: 79.8 FL — LOW (ref 80–100)
MRSA PCR RESULT.: SIGNIFICANT CHANGE UP
NRBC # BLD: 0 /100 WBCS — SIGNIFICANT CHANGE UP (ref 0–0)
PLATELET # BLD AUTO: 149 K/UL — LOW (ref 150–400)
POTASSIUM SERPL-MCNC: 3.4 MMOL/L — LOW (ref 3.5–5.3)
POTASSIUM SERPL-SCNC: 3.4 MMOL/L — LOW (ref 3.5–5.3)
RBC # BLD: 4.51 M/UL — SIGNIFICANT CHANGE UP (ref 4.2–5.8)
RBC # FLD: 15.9 % — HIGH (ref 10.3–14.5)
S AUREUS DNA NOSE QL NAA+PROBE: SIGNIFICANT CHANGE UP
SODIUM SERPL-SCNC: 146 MMOL/L — HIGH (ref 135–145)
WBC # BLD: 14.26 K/UL — HIGH (ref 3.8–10.5)
WBC # FLD AUTO: 14.26 K/UL — HIGH (ref 3.8–10.5)

## 2022-11-25 PROCEDURE — 70551 MRI BRAIN STEM W/O DYE: CPT | Mod: 26

## 2022-11-25 RX ORDER — DEXTROSE MONOHYDRATE, SODIUM CHLORIDE, AND POTASSIUM CHLORIDE 50; .745; 4.5 G/1000ML; G/1000ML; G/1000ML
1000 INJECTION, SOLUTION INTRAVENOUS
Refills: 0 | Status: DISCONTINUED | OUTPATIENT
Start: 2022-11-25 | End: 2022-11-26

## 2022-11-25 RX ORDER — DILTIAZEM HCL 120 MG
120 CAPSULE, EXT RELEASE 24 HR ORAL DAILY
Refills: 0 | Status: DISCONTINUED | OUTPATIENT
Start: 2022-11-25 | End: 2022-11-26

## 2022-11-25 RX ORDER — GABAPENTIN 400 MG/1
400 CAPSULE ORAL THREE TIMES A DAY
Refills: 0 | Status: DISCONTINUED | OUTPATIENT
Start: 2022-11-25 | End: 2022-11-29

## 2022-11-25 RX ADMIN — Medication 1: at 00:20

## 2022-11-25 RX ADMIN — Medication 120 MILLIGRAM(S): at 18:42

## 2022-11-25 RX ADMIN — LATANOPROST 1 DROP(S): 0.05 SOLUTION/ DROPS OPHTHALMIC; TOPICAL at 23:14

## 2022-11-25 RX ADMIN — PIPERACILLIN AND TAZOBACTAM 25 GRAM(S): 4; .5 INJECTION, POWDER, LYOPHILIZED, FOR SOLUTION INTRAVENOUS at 15:55

## 2022-11-25 RX ADMIN — PIPERACILLIN AND TAZOBACTAM 25 GRAM(S): 4; .5 INJECTION, POWDER, LYOPHILIZED, FOR SOLUTION INTRAVENOUS at 05:20

## 2022-11-25 RX ADMIN — PIPERACILLIN AND TAZOBACTAM 25 GRAM(S): 4; .5 INJECTION, POWDER, LYOPHILIZED, FOR SOLUTION INTRAVENOUS at 23:12

## 2022-11-25 RX ADMIN — GABAPENTIN 400 MILLIGRAM(S): 400 CAPSULE ORAL at 23:12

## 2022-11-25 RX ADMIN — DEXTROSE MONOHYDRATE, SODIUM CHLORIDE, AND POTASSIUM CHLORIDE 60 MILLILITER(S): 50; .745; 4.5 INJECTION, SOLUTION INTRAVENOUS at 13:06

## 2022-11-25 RX ADMIN — Medication 5 MILLIGRAM(S): at 05:59

## 2022-11-25 RX ADMIN — DEXTROSE MONOHYDRATE, SODIUM CHLORIDE, AND POTASSIUM CHLORIDE 60 MILLILITER(S): 50; .745; 4.5 INJECTION, SOLUTION INTRAVENOUS at 18:42

## 2022-11-25 NOTE — OCCUPATIONAL THERAPY INITIAL EVALUATION ADULT - ORIENTATION, REHAB EVAL
Pt nodded head to indicate "yes" inconsistently when asked questions re: name, year, location.
unable to assess

## 2022-11-25 NOTE — OCCUPATIONAL THERAPY INITIAL EVALUATION ADULT - REHAB POTENTIAL, OT EVAL
Pt is at his baseline for ADLs, does not follow commands, and does not require skilled inpatient OT./none
none

## 2022-11-25 NOTE — OCCUPATIONAL THERAPY INITIAL EVALUATION ADULT - LEVEL OF INDEPENDENCE: SIT/STAND, REHAB EVAL
total assist lift at baseline/unable to perform
pt dependent, bedbound at baseline and requires use of a mechanical lift/unable to perform

## 2022-11-25 NOTE — PROGRESS NOTE ADULT - PROBLEM SELECTOR PLAN 5
Chronic  - Hydralazine 50 mg q8h and losartan 50 q12h,  - PO meds HOLD   - Continue IV lopressor 5mg q6 Chronic  - Meds: Hydralazine 50 mg q8h and losartan 50 q12h  - Continue holding home BP meds as pt is within target BP range  - DC IV lopressor 5mg q6

## 2022-11-25 NOTE — PROGRESS NOTE ADULT - SUBJECTIVE AND OBJECTIVE BOX
[facility], Division of Infectious Diseases  LADONNA Ramirez Y. Patel, S. Shah, G. Harrison  [phone]  after hours and weekends [phone]    Name: MELBA PARMAR  Age: 72y  Gender: Male  MRN: [id number]    Interval History--  Notes reviewed  nonverbal      Allergies    No Known Allergies    Intolerances        Medications--  Antibiotics:  piperacillin/tazobactam IVPB.. 3.375 Gram(s) IV Intermittent every 8 hours    Immunologic:    Other:  acetaminophen  Suppository .. PRN  aluminum hydroxide/magnesium hydroxide/simethicone Suspension PRN  dextrose 5% + sodium chloride 0.45% with potassium chloride 20 mEq/L  dextrose 5%.  dextrose 5%.  dextrose 50% Injectable  dextrose 50% Injectable  dextrose 50% Injectable  dextrose Oral Gel PRN  diltiazem   CD  gabapentin  glucagon  Injectable  insulin lispro (ADMELOG) corrective regimen sliding scale  latanoprost 0.005% Ophthalmic Solution  methylphenidate  methylphenidate  ondansetron Injectable PRN      Review of Systems--  A 10-point review of systems was obtained.   unable to obtain     Review of systems otherwise negative except as previously noted.    Physical Examination--  Vital Signs: T(F): 98.8 (11-25-22 @ 12:50), Max: 99.3 (11-24-22 @ 20:17)  HR: 106 (11-25-22 @ 12:50)  BP: 114/79 (11-25-22 @ 12:50)  RR: 18 (11-25-22 @ 12:50)  SpO2: 97% (11-25-22 @ 12:50)  Wt(kg): --  General: Nontoxic-appearing Male in no acute distress.  HEENT: AT/NC.  Neck: Not rigid. No sense of mass.  Nodes: None palpable.  Lungs: Clear bilaterally without rales, wheezing or rhonchi  Heart: tachy s1s2  Abdomen: Bowel sounds present and normoactive. Soft. Nondistended. Nontender.   Back: No spinal tenderness. No costovertebral angle tenderness.   Extremities: No cyanosis or clubbing. No edema.   Skin: Warm. Dry. Good turgor. No rash. No vasculitic stigmata.          Laboratory Studies--  CBC                        11.6   14.26 )-----------( 149      ( 25 Nov 2022 08:45 )             36.0       Chemistries  11-25    146<H>  |  117<H>  |  11  ----------------------------<  158<H>  3.4<L>   |  22  |  0.82    Ca    8.0<L>      25 Nov 2022 08:45  Phos  2.3     11-24  Mg     1.8     11-24    TPro  5.2<L>  /  Alb  2.3<L>  /  TBili  0.7  /  DBili  x   /  AST  11<L>  /  ALT  15  /  AlkPhos  87  11-24      Culture Data    Culture - Blood (collected 24 Nov 2022 06:40)  Source: .Blood Blood-Peripheral  Preliminary Report (25 Nov 2022 14:01):    No growth to date.    Culture - Blood (collected 24 Nov 2022 06:37)  Source: .Blood Blood-Peripheral  Preliminary Report (25 Nov 2022 14:01):    No growth to date.    Culture - Urine (collected 22 Nov 2022 22:10)  Source: Catheterized Catheterized  Final Report (24 Nov 2022 07:27):    <10,000 CFU/mL Normal Urogenital Angle    Culture - Blood (collected 22 Nov 2022 22:10)  Source: .Blood Blood-Venous  Preliminary Report (24 Nov 2022 02:03):    No growth to date.    Culture - Blood (collected 22 Nov 2022 22:05)  Source: .Blood Blood-Venous  Preliminary Report (24 Nov 2022 02:03):    No growth to date.

## 2022-11-25 NOTE — PROGRESS NOTE ADULT - PROBLEM SELECTOR PLAN 1
Fever- Sepsis with Leukocytosis, Hypotension   Suspect  possible Aspiration PNA   - 11/22 Blood x2 and urine culture NGTD  - Aspiration precaution  - Continue Zosyn IVPB q 8 hrs   - Tylenol 650 mg WA  Suppository   - IVF NS 60  ml /hr after Bolus NS   - ID Dr Boggs d/w   - 11/24 CXR: Right-sided infiltrate presently seen.  CBC in AM  -  NPO, S&S follow up Fever- Sepsis with Leukocytosis, Hypotension   Suspect  possible Aspiration PNA   - 11/22 Blood x2 and urine culture NGTD  - Aspiration precaution  - Continue Zosyn IVPB q 8 hrs   - Tylenol 650 mg NE  Suppository   - IVF NS 60 ml/hr  - ID Dr Boggs d/w   - 11/24 CXR: Right-sided infiltrate presently seen.  CBC in AM Fever- Sepsis with Leukocytosis, Hypotension   Suspect  possible Aspiration PNA   - 11/22 Blood x2 and urine culture NGTD  - WBC downtrending 25k ->14k, continue to monitor  - Continue Zosyn IVPB q 8 hrs  - Started on mod thick liqiuds w/ aspiration precautions  - IVF NS 60 ml/hr  - Tylenol 650 mg ID  Suppository   - ID Dr Boggs d/w   - 11/24 CXR: Right-sided infiltrate presently seen. Fever- Sepsis with Leukocytosis, Hypotension   Suspect  possible Aspiration PNA   - 11/22 Blood x2 and urine culture NGTD  - WBC downtrending 25k ->14k, continue to monitor  - Continue Zosyn IVPB q 8 hrs  - Started on mod thick liqiuds w/ aspiration precautions  - IVF NS 60 ml/hr  - Tylenol 650 mg MT  Suppository   - ID Dr Boggs d/w -continue IV Zosyn   - 11/24 CXR: Right-sided infiltrate presently seen.

## 2022-11-25 NOTE — PROGRESS NOTE ADULT - PROBLEM SELECTOR PLAN 9
Chronic   - Mirtazapine, d/c'd due to mental status  - Oral meds held for now as pt is NPO Chronic   - Mirtazapine, d/c'd due to mental status  - Oral meds held for now

## 2022-11-25 NOTE — OCCUPATIONAL THERAPY INITIAL EVALUATION ADULT - GENERAL OBSERVATIONS, REHAB EVAL
Pt received semi-supine in bed, on 2L O2 via NC, +tele, +PIV, +cooling blanket (temp 98.1), asleep but opens eyes to voice. Pt left semi-supine in bed, all lines intact, in NAD.
Pt received supine in bed (+) alarm, only states "ow" or "no" to noxious stimuli but otherwise does not speak.

## 2022-11-25 NOTE — OCCUPATIONAL THERAPY INITIAL EVALUATION ADULT - MUSCLE TONE ASSESSMENT, REHAB EVAL
Increased tone bilaterally, difficult to formally assess due to pt stating "no!" when PROM attempted. Pt appears to have increased tone on right versus left.
RUE worse than LUE/mildly increased tone/moderately increased tone

## 2022-11-25 NOTE — OCCUPATIONAL THERAPY INITIAL EVALUATION ADULT - MANUAL MUSCLE TESTING RESULTS, REHAB EVAL
unable to formally assess due to pt cognition/not tested due to
Hand grasp 2/5. No active movement shoulder to wrist.

## 2022-11-25 NOTE — OCCUPATIONAL THERAPY INITIAL EVALUATION ADULT - NS ASR FOLLOW COMMAND OT EVAL
unable to follow commands
Inconsistently able to follow commands. Pt squeeze therapist's hands inconsistently on command./25% of the time

## 2022-11-25 NOTE — PROGRESS NOTE ADULT - SUBJECTIVE AND OBJECTIVE BOX
Patient is a 72y old  Male who presents with a chief complaint of acute on chronic AMS (2022 14:59)    HPI:  72M BPH CAD, HTN, HLD, Afib, SDH s/p mechanical fall, recurrent ICH when on AC brought today for weakness. History obtained from pt's wife Love and chart review given patient medical condition. Pt admitted from 10/19 to 10/22/2022 at [facility] for SHD s/p adexxa. Pt's wife endorses that patient has been progressively deteriorating, states that now only is reactive to painful stimulus, prior used to articulate some words. Patient completed 2 weeks of outpatient PT, has 8-10 hours nurse aid daily. Temp at home 99.9. Wife assist with feeding, was seen by neuro who start mirtazapine and sent for neurologic work up.     ED course  VS: Afebrile, HR: 96, BP: 154/97, SpO2: 97% RA RR 19  Labs significant for glucose 227, Alkaline phosphate 227, UA shows glucose 250  CT head showed chronic subdural hematomas overlying the left side of the posterior falx measuring up to 1.7 cm and 1.5 cm overlying the left temporal convexity.  Mild mass effect with sulcal effacement and partial compression of the  left lateral ventricle. No new acute intracranial hemorrhage.  EKG shows Afib with RVR @ 110 bpm, Right axis deviation    (2022 01:11)    INTERVAL HPI:  22: pt seen, examined , Pt had fever 103.8 & 101 early this AM , Afebrile Now, ON IV fluids, IV Bolus given, Leukocytosis ,Started on IV Abx Zosyn, wife at bedside, ID  Dr Boggs called , Hypokalemia - IV K replaced. Remeron d/c.    22: Patient seen and examined at bedside. Pt following some simple commands (move hands, squeeze finger), otherwise unresponsive to speech and unable to cooperate.    OVERNIGHT EVENTS: Mild tachycardia in AM. Afebrile, hemodynamically stable    Home Medications:  gabapentin 400 mg oral capsule: 1 cap(s) orally 3 times a day (2022 02:49)  hydrALAZINE 25 mg oral tablet: 2 tab(s) orally every 8 hours (2022 02:49)  latanoprost 0.005% ophthalmic solution: 1 drop(s) to each affected eye once a day (in the evening) (2022 02:49)  losartan 25 mg oral tablet: 2 tab(s) orally 2 times a day (2022 02:49)  methylphenidate 10 mg oral tablet: 2 pills at 8AM; 1 pill at 1PM (2022 02:49)  mirtazapine 7.5 mg oral tablet: 1 tab(s) orally once a day (at bedtime) (2022 02:49)  rosuvastatin 10 mg oral tablet: 1 tab(s) orally once a day (2022 02:49)      MEDICATIONS  (STANDING):  dextrose 5% + sodium chloride 0.45%. 1000 milliLiter(s) (500 mL/Hr) IV Continuous <Continuous>  dextrose 5%. 1000 milliLiter(s) (100 mL/Hr) IV Continuous <Continuous>  dextrose 5%. 1000 milliLiter(s) (50 mL/Hr) IV Continuous <Continuous>  dextrose 50% Injectable 25 Gram(s) IV Push once  dextrose 50% Injectable 12.5 Gram(s) IV Push once  dextrose 50% Injectable 25 Gram(s) IV Push once  gabapentin 400 milliGRAM(s) Oral three times a day  glucagon  Injectable 1 milliGRAM(s) IntraMuscular once  insulin lispro (ADMELOG) corrective regimen sliding scale   SubCutaneous every 6 hours  latanoprost 0.005% Ophthalmic Solution 1 Drop(s) Both EYES at bedtime  methylphenidate 20 milliGRAM(s) Oral daily  methylphenidate 10 milliGRAM(s) Oral daily  metoprolol tartrate Injectable 5 milliGRAM(s) IV Push every 6 hours  mirtazapine 7.5 milliGRAM(s) Oral daily  piperacillin/tazobactam IVPB.- 3.375 Gram(s) IV Intermittent once  piperacillin/tazobactam IVPB.. 3.375 Gram(s) IV Intermittent every 8 hours  potassium chloride  10 mEq/100 mL IVPB 10 milliEquivalent(s) IV Intermittent every 1 hour  sodium chloride 0.9%. 1000 milliLiter(s) (60 mL/Hr) IV Continuous <Continuous>    MEDICATIONS  (PRN):  acetaminophen     Tablet .. 650 milliGRAM(s) Oral every 6 hours PRN Temp greater or equal to 38C (100.4F), Mild Pain (1 - 3)  aluminum hydroxide/magnesium hydroxide/simethicone Suspension 30 milliLiter(s) Oral every 4 hours PRN Dyspepsia  dextrose Oral Gel 15 Gram(s) Oral once PRN Blood Glucose LESS THAN 70 milliGRAM(s)/deciliter  ondansetron Injectable 4 milliGRAM(s) IV Push every 8 hours PRN Nausea and/or Vomiting      Allergies    No Known Allergies    Intolerances        Social History:  Tobacco: denies   EtOH: denies   Recreational drug use: denies   Lives with: wife   Ambulates: bedbound, has a nurse aid 8-10 daily   Vaccinations: Fully COVID-19 vaccinated (2022 01:11)      REVIEW OF SYSTEMS: pt is awake, mumble  ROS Unable to obtain due to - [  ] Dementia  [  ] Lethargy [x ] Drowsy [  ] Sedated [  ] non verbal    Vital Signs Last 24 Hrs  T(C): 36.8 (2022 13:38), Max: 39.9 (2022 05:45)  T(F): 98.2 (2022 13:38), Max: 103.8 (2022 05:45)  HR: 90 (2022 13:38) (70 - 120)  BP: 116/66 (2022 13:38) (91/58 - 178/95)  BP(mean): --  RR: 20 (2022 13:38) (18 - 24)  SpO2: 94% (2022 08:15) (89% - 96%)    Parameters below as of 2022 13:38  Patient On (Oxygen Delivery Method): nasal cannula  O2 Flow (L/min): 2    Finger Stick      PHYSICAL EXAM:  GENERAL:  [ x ] NAD , [ x ] well appearing, [  ] Agitated, [x  ] Drowsy,  [x  ] Lethargy, [  ] confused   HEAD:  [ x ] Normal, [  ] Other  EYES:  [x  ] EOMI, [ x ] PERRLA, [x  ] conjunctiva and sclera clear normal, [  ] Other,  [ x ] Pallor,[  ] Discharge  ENMT:  [ x ] Normal, [x  ] Dry  mucous membranes, [ x ] Good dentition, [ x ] No Thrush  NECK:  [ x ] Supple, [ x ] No JVD, [ x ] Normal thyroid, [  ] Lymphadenopathy [  ] Other  CHEST/LUNG:  [ x ] Clear to auscultation bilaterally, [ x ] Breath Sounds equal B/L / Decrease, [x  ] poor effort  [ x ] No rales, [ x ] No rhonchi  [x  ]  No wheezing,   HEART:  [ x ] Regular rate and rhythm, [  ] tachycardia, [  ] Bradycardia,  [  ] irregular  [ x ] No murmurs, No rubs, No gallops, [  ] PPM in place (Mfr:  )  ABDOMEN: [ x ] Pt repeating "ouch" when abdomen pressed, continues to repeat ouch even after finished with abd exam. [ x ] Soft, [ x ] Nondistended, [ x ] No mass, [x  ] Bowel sounds present, [x  ] obese  NERVOUS SYSTEM:  [  ] Alert & Oriented x , [ x ] Nonfocal  [  ] Confusion  [  ] Encephalopathic [  ] Sedated [  ] Unable to assess, [  ] Dementia [ x ] Other- awake, does NOT follow all direction  EXTREMITIES: [ x ] 2+ Peripheral Pulses, No clubbing, No cyanosis,  [  ] edema B/L lower EXT. [ x ] PVD stasis skin changes B/L Lower EXT, [  ] wound  LYMPH: No lymphadenopathy noted  SKIN:  [  ] No rashes or lesions, [  ] Pressure Ulcers, [  ] ecchymosis, [  ] Skin Tears, [x  ] Other- dry skin, with Scabs all EXT    DIET: Diet, NPO:   Except Medications (22 @ 02:11)      LABS:                        12.2   25.02 )-----------( 247      ( 2022 06:37 )             36.7     2022 06:37    143    |  110    |  10     ----------------------------<  184    3.2     |  24     |  0.97     Ca    8.3        2022 06:37  Phos  2.3       2022 06:37  Mg     1.8       2022 06:37    TPro  5.2    /  Alb  2.3    /  TBili  0.7    /  DBili  x      /  AST  11     /  ALT  15     /  AlkPhos  87     2022 06:37    PT/INR - ( 2022 22:10 )   PT: 12.7 sec;   INR: 1.08 ratio         PTT - ( 2022 22:10 )  PTT:34.9 sec  Urinalysis Basic - ( 2022 08:26 )    Color: Yellow / Appearance: Slightly Turbid / S.010 / pH: x  Gluc: x / Ketone: Small  / Bili: Negative / Urobili: 1   Blood: x / Protein: 100 / Nitrite: Negative   Leuk Esterase: Moderate / RBC: >50 /HPF / WBC >50   Sq Epi: x / Non Sq Epi: Few / Bacteria: Few        Culture Results:   No growth to date. ( @ 22:10)  Culture Results:   <10,000 CFU/mL Normal Urogenital Angle ( @ 22:10)  Culture Results:   No growth to date. ( @ 22:05)      culture blood  -- .Blood Blood-Venous  @ 22:10    culture urine  --   @ 22:10  culture blood  -- .Blood Blood-Venous  @ 22:05    culture urine  --   @ 22:05    Culture - Urine (collected 2022 22:10)  Source: Catheterized Catheterized  Final Report (2022 07:27):    <10,000 CFU/mL Normal Urogenital Angle    Culture - Blood (collected 2022 22:10)  Source: .Blood Blood-Venous  Preliminary Report (2022 02:03):    No growth to date.    Culture - Blood (collected 2022 22:05)  Source: .Blood Blood-Venous  Preliminary Report (2022 02:03):    No growth to date.         Anemia Panel:  Vitamin B12, Serum: 656 pg/mL (22 @ 06:37)    RADIOLOGY & ADDITIONAL TESTS:  < from: Xray Chest 1 View- PORTABLE-Urgent (Xray Chest 1 View- PORTABLE-Urgent .) (22 @ 13:02) >    ACC: [id number] EXAM:  XR CHEST PORTABLE URGENT 1V                          PROCEDURE DATE:  2022          INTERPRETATION:  AP semierect chest on 2022 at 12:46 PM.   Patient has fever.    Heart magnified by technique.    There is aright lower perihilar infiltrate in scattered fashion which is   new since .    IMPRESSION: Right-sided infiltrate presently seen.    < end of copied text >  CXR      HEALTH ISSUES - PROBLEM Dx:  AMS (altered mental status)    H/O spontaneous intraparenchymal intracranial hemorrhage    Chronic atrial fibrillation    HTN (hypertension)    Mild fever    Motor system disease    History of diabetes mellitus    Peripheral neuropathy    Major depression    Need for prophylactic measure      Consultant(s) Notes Reviewed:  [x  ] YES     Care Discussed with [X] Consultants  [ x ] Patient  [x  ] Family - wife Marj [  ] HCP [  ]   [  ] Social Service  [ x ] RN, [  ] Physical Therapy,[  ] Palliative care team  DVT PPX: [  ] Lovenox, [  ] S C Heparin, [  ] Coumadin, [  ] Xarelto, [  ] Eliquis, [  ] Pradaxa, [  ] IV Heparin drip, [ x ] SCD [  ] Contraindication 2 to GI Bleed,[  ] Ambulation [  ] Contraindicated 2 to  bleed [x  ] Contraindicated 2 to Brain Bleed  Advanced directive: [  ] None, [x  ] DNR/DNI Patient is a 72y old  Male who presents with a chief complaint of acute on chronic AMS (2022 14:59)    HPI:  72M BPH CAD, HTN, HLD, Afib, SDH s/p mechanical fall, recurrent ICH when on AC brought today for weakness. History obtained from pt's wife Love and chart review given patient medical condition. Pt admitted from 10/19 to 10/22/2022 at [facility] for SHD s/p adexxa. Pt's wife endorses that patient has been progressively deteriorating, states that now only is reactive to painful stimulus, prior used to articulate some words. Patient completed 2 weeks of outpatient PT, has 8-10 hours nurse aid daily. Temp at home 99.9. Wife assist with feeding, was seen by neuro who start mirtazapine and sent for neurologic work up.     ED course  VS: Afebrile, HR: 96, BP: 154/97, SpO2: 97% RA RR 19  Labs significant for glucose 227, Alkaline phosphate 227, UA shows glucose 250  CT head showed chronic subdural hematomas overlying the left side of the posterior falx measuring up to 1.7 cm and 1.5 cm overlying the left temporal convexity.  Mild mass effect with sulcal effacement and partial compression of the  left lateral ventricle. No new acute intracranial hemorrhage.  EKG shows Afib with RVR @ 110 bpm, Right axis deviation    (2022 01:11)    INTERVAL HPI:  22: pt seen, examined , Pt had fever 103.8 & 101 early this AM , Afebrile Now, ON IV fluids, IV Bolus given, Leukocytosis ,Started on IV Abx Zosyn, wife at bedside, ID  Dr Boggs called , Hypokalemia - IV K replaced. Remeron d/c.    22: Patient seen and examined at bedside. Pt following some simple commands (move hands, squeeze finger), otherwise unresponsive to speech and unable to cooperate in AM. Reevaluated x2 in AM by speech, started on moderately thick liquids.    OVERNIGHT EVENTS: Mild tachycardia in AM. Afebrile, hemodynamically stable    Home Medications:  gabapentin 400 mg oral capsule: 1 cap(s) orally 3 times a day (2022 02:49)  hydrALAZINE 25 mg oral tablet: 2 tab(s) orally every 8 hours (2022 02:49)  latanoprost 0.005% ophthalmic solution: 1 drop(s) to each affected eye once a day (in the evening) (:49)  losartan 25 mg oral tablet: 2 tab(s) orally 2 times a day (:49)  methylphenidate 10 mg oral tablet: 2 pills at 8AM; 1 pill at 1PM (:49)  mirtazapine 7.5 mg oral tablet: 1 tab(s) orally once a day (at bedtime) (:49)  rosuvastatin 10 mg oral tablet: 1 tab(s) orally once a day (:49)      MEDICATIONS  (STANDING):  dextrose 5% + sodium chloride 0.45%. 1000 milliLiter(s) (500 mL/Hr) IV Continuous <Continuous>  dextrose 5%. 1000 milliLiter(s) (100 mL/Hr) IV Continuous <Continuous>  dextrose 5%. 1000 milliLiter(s) (50 mL/Hr) IV Continuous <Continuous>  dextrose 50% Injectable 25 Gram(s) IV Push once  dextrose 50% Injectable 12.5 Gram(s) IV Push once  dextrose 50% Injectable 25 Gram(s) IV Push once  gabapentin 400 milliGRAM(s) Oral three times a day  glucagon  Injectable 1 milliGRAM(s) IntraMuscular once  insulin lispro (ADMELOG) corrective regimen sliding scale   SubCutaneous every 6 hours  latanoprost 0.005% Ophthalmic Solution 1 Drop(s) Both EYES at bedtime  methylphenidate 20 milliGRAM(s) Oral daily  methylphenidate 10 milliGRAM(s) Oral daily  metoprolol tartrate Injectable 5 milliGRAM(s) IV Push every 6 hours  mirtazapine 7.5 milliGRAM(s) Oral daily  piperacillin/tazobactam IVPB.- 3.375 Gram(s) IV Intermittent once  piperacillin/tazobactam IVPB.. 3.375 Gram(s) IV Intermittent every 8 hours  potassium chloride  10 mEq/100 mL IVPB 10 milliEquivalent(s) IV Intermittent every 1 hour  sodium chloride 0.9%. 1000 milliLiter(s) (60 mL/Hr) IV Continuous <Continuous>    MEDICATIONS  (PRN):  acetaminophen     Tablet .. 650 milliGRAM(s) Oral every 6 hours PRN Temp greater or equal to 38C (100.4F), Mild Pain (1 - 3)  aluminum hydroxide/magnesium hydroxide/simethicone Suspension 30 milliLiter(s) Oral every 4 hours PRN Dyspepsia  dextrose Oral Gel 15 Gram(s) Oral once PRN Blood Glucose LESS THAN 70 milliGRAM(s)/deciliter  ondansetron Injectable 4 milliGRAM(s) IV Push every 8 hours PRN Nausea and/or Vomiting      Allergies    No Known Allergies    Intolerances        Social History:  Tobacco: denies   EtOH: denies   Recreational drug use: denies   Lives with: wife   Ambulates: bedbound, has a nurse aid 8-10 daily   Vaccinations: Fully COVID-19 vaccinated (2022 01:11)      REVIEW OF SYSTEMS: pt is awake, mumble  ROS Unable to obtain due to - [  ] Dementia  [  ] Lethargy [x ] Drowsy [  ] Sedated [  ] non verbal    Vital Signs Last 24 Hrs  T(C): 36.8 (2022 13:38), Max: 39.9 (2022 05:45)  T(F): 98.2 (2022 13:38), Max: 103.8 (2022 05:45)  HR: 90 (2022 13:38) (70 - 120)  BP: 116/66 (2022 13:38) (91/58 - 178/95)  BP(mean): --  RR: 20 (2022 13:38) (18 - 24)  SpO2: 94% (2022 08:15) (89% - 96%)    Parameters below as of 2022 13:38  Patient On (Oxygen Delivery Method): nasal cannula  O2 Flow (L/min): 2    Finger Stick      PHYSICAL EXAM:  GENERAL:  [ x ] NAD , [ x ] well appearing, [  ] Agitated, [x  ] Drowsy,  [x  ] Lethargy, [  ] confused   HEAD:  [ x ] Normal, [  ] Other  EYES:  [x  ] EOMI, [ x ] PERRLA, [x  ] conjunctiva and sclera clear normal, [  ] Other,  [ x ] Pallor,[  ] Discharge  ENMT:  [ x ] Normal, [x  ] Dry  mucous membranes, [ x ] Good dentition, [ x ] No Thrush  NECK:  [ x ] Supple, [ x ] No JVD, [ x ] Normal thyroid, [  ] Lymphadenopathy [  ] Other  CHEST/LUNG:  [ x ] Clear to auscultation bilaterally, [ x ] Breath Sounds equal B/L / Decrease, [x  ] poor effort  [ x ] No rales, [ x ] No rhonchi  [x  ]  No wheezing,   HEART:  [ x ] Regular rate and rhythm, [  ] tachycardia, [  ] Bradycardia,  [  ] irregular  [ x ] No murmurs, No rubs, No gallops, [  ] PPM in place (Mfr:  )  ABDOMEN: [ x ] Pt repeating "ouch" when abdomen pressed, continues to repeat ouch even after finished with abd exam. [ x ] Soft, [ x ] Nondistended, [ x ] No mass, [x  ] Bowel sounds present, [x  ] obese  NERVOUS SYSTEM:  [  ] Alert & Oriented x , [ x ] Nonfocal  [  ] Confusion  [  ] Encephalopathic [  ] Sedated [  ] Unable to assess, [  ] Dementia [ x ] Other- awake, does NOT follow all direction  EXTREMITIES: [ x ] 2+ Peripheral Pulses, No clubbing, No cyanosis,  [  ] edema B/L lower EXT. [ x ] PVD stasis skin changes B/L Lower EXT, [  ] wound  LYMPH: No lymphadenopathy noted  SKIN:  [  ] No rashes or lesions, [  ] Pressure Ulcers, [  ] ecchymosis, [  ] Skin Tears, [x  ] Other- dry skin, with Scabs all EXT    DIET: Diet, NPO:   Except Medications (22 @ 02:11)      LABS:                        12.2   25.02 )-----------( 247      ( 2022 06:37 )             36.7     2022 06:37    143    |  110    |  10     ----------------------------<  184    3.2     |  24     |  0.97     Ca    8.3        2022 06:37  Phos  2.3       2022 06:37  Mg     1.8       2022 06:37    TPro  5.2    /  Alb  2.3    /  TBili  0.7    /  DBili  x      /  AST  11     /  ALT  15     /  AlkPhos  87     2022 06:37    PT/INR - ( 2022 22:10 )   PT: 12.7 sec;   INR: 1.08 ratio         PTT - ( 2022 22:10 )  PTT:34.9 sec  Urinalysis Basic - ( 2022 08:26 )    Color: Yellow / Appearance: Slightly Turbid / S.010 / pH: x  Gluc: x / Ketone: Small  / Bili: Negative / Urobili: 1   Blood: x / Protein: 100 / Nitrite: Negative   Leuk Esterase: Moderate / RBC: >50 /HPF / WBC >50   Sq Epi: x / Non Sq Epi: Few / Bacteria: Few        Culture Results:   No growth to date. ( @ 22:10)  Culture Results:   <10,000 CFU/mL Normal Urogenital Agnle ( @ 22:10)  Culture Results:   No growth to date. ( @ 22:05)      culture blood  -- .Blood Blood-Venous  @ 22:10    culture urine  --   @ 22:10  culture blood  -- .Blood Blood-Venous  @ 22:05    culture urine  --   @ 22:05    Culture - Urine (collected 2022 22:10)  Source: Catheterized Catheterized  Final Report (2022 07:27):    <10,000 CFU/mL Normal Urogenital Angle    Culture - Blood (collected 2022 22:10)  Source: .Blood Blood-Venous  Preliminary Report (2022 02:03):    No growth to date.    Culture - Blood (collected 2022 22:05)  Source: .Blood Blood-Venous  Preliminary Report (2022 02:03):    No growth to date.         Anemia Panel:  Vitamin B12, Serum: 656 pg/mL (22 @ 06:37)    RADIOLOGY & ADDITIONAL TESTS:  < from: Xray Chest 1 View- PORTABLE-Urgent (Xray Chest 1 View- PORTABLE-Urgent .) (22 @ 13:02) >    ACC: [id number] EXAM:  XR CHEST PORTABLE URGENT 1V                          PROCEDURE DATE:  2022          INTERPRETATION:  AP semierect chest on 2022 at 12:46 PM.   Patient has fever.    Heart magnified by technique.    There is aright lower perihilar infiltrate in scattered fashion which is   new since .    IMPRESSION: Right-sided infiltrate presently seen.    < end of copied text >  CXR      HEALTH ISSUES - PROBLEM Dx:  AMS (altered mental status)    H/O spontaneous intraparenchymal intracranial hemorrhage    Chronic atrial fibrillation    HTN (hypertension)    Mild fever    Motor system disease    History of diabetes mellitus    Peripheral neuropathy    Major depression    Need for prophylactic measure      Consultant(s) Notes Reviewed:  [x  ] YES     Care Discussed with [X] Consultants  [ x ] Patient  [x  ] Family - wife Marj [  ] HCP [  ]   [  ] Social Service  [ x ] RN, [  ] Physical Therapy,[  ] Palliative care team  DVT PPX: [  ] Lovenox, [  ] S C Heparin, [  ] Coumadin, [  ] Xarelto, [  ] Eliquis, [  ] Pradaxa, [  ] IV Heparin drip, [ x ] SCD [  ] Contraindication 2 to GI Bleed,[  ] Ambulation [  ] Contraindicated 2 to  bleed [x  ] Contraindicated 2 to Brain Bleed  Advanced directive: [  ] None, [x  ] DNR/DNI Patient is a 72y old  Male who presents with a chief complaint of acute on chronic AMS (2022 14:59)    HPI:  72M BPH CAD, HTN, HLD, Afib, SDH s/p mechanical fall, recurrent ICH when on AC brought today for weakness. History obtained from pt's wife Love and chart review given patient medical condition. Pt admitted from 10/19 to 10/22/2022 at [facility] for SHD s/p adexxa. Pt's wife endorses that patient has been progressively deteriorating, states that now only is reactive to painful stimulus, prior used to articulate some words. Patient completed 2 weeks of outpatient PT, has 8-10 hours nurse aid daily. Temp at home 99.9. Wife assist with feeding, was seen by neuro who start mirtazapine and sent for neurologic work up.     ED course  VS: Afebrile, HR: 96, BP: 154/97, SpO2: 97% RA RR 19  Labs significant for glucose 227, Alkaline phosphate 227, UA shows glucose 250  CT head showed chronic subdural hematomas overlying the left side of the posterior falx measuring up to 1.7 cm and 1.5 cm overlying the left temporal convexity.  Mild mass effect with sulcal effacement and partial compression of the  left lateral ventricle. No new acute intracranial hemorrhage.  EKG shows Afib with RVR @ 110 bpm, Right axis deviation    (2022 01:11)    INTERVAL HPI:  22: pt seen, examined , Pt had fever 103.8 & 101 early this AM , Afebrile Now, ON IV fluids, IV Bolus given, Leukocytosis ,Started on IV Abx Zosyn, wife at bedside, ID  Dr Boggs called , Hypokalemia - IV K replaced. Remeron d/c.    22: Patient seen and examined at bedside. Pt following some simple commands (move hands, squeeze finger), otherwise unresponsive to speech and unable to cooperate in AM. Reevaluated x2 in AM by speech, started on moderately thick liquids. WBC improving, replace K .    OVERNIGHT EVENTS: Mild tachycardia in AM. Afebrile, hemodynamically stable    Home Medications:  gabapentin 400 mg oral capsule: 1 cap(s) orally 3 times a day (2022 02:49)  hydrALAZINE 25 mg oral tablet: 2 tab(s) orally every 8 hours (2022 02:49)  latanoprost 0.005% ophthalmic solution: 1 drop(s) to each affected eye once a day (in the evening) (:49)  losartan 25 mg oral tablet: 2 tab(s) orally 2 times a day (:49)  methylphenidate 10 mg oral tablet: 2 pills at 8AM; 1 pill at 1PM (:49)  mirtazapine 7.5 mg oral tablet: 1 tab(s) orally once a day (at bedtime) (:49)  rosuvastatin 10 mg oral tablet: 1 tab(s) orally once a day (2022 02:49)      MEDICATIONS  (STANDING):  dextrose 5% + sodium chloride 0.45%. 1000 milliLiter(s) (500 mL/Hr) IV Continuous <Continuous>  dextrose 5%. 1000 milliLiter(s) (100 mL/Hr) IV Continuous <Continuous>  dextrose 5%. 1000 milliLiter(s) (50 mL/Hr) IV Continuous <Continuous>  dextrose 50% Injectable 25 Gram(s) IV Push once  dextrose 50% Injectable 12.5 Gram(s) IV Push once  dextrose 50% Injectable 25 Gram(s) IV Push once  gabapentin 400 milliGRAM(s) Oral three times a day  glucagon  Injectable 1 milliGRAM(s) IntraMuscular once  insulin lispro (ADMELOG) corrective regimen sliding scale   SubCutaneous every 6 hours  latanoprost 0.005% Ophthalmic Solution 1 Drop(s) Both EYES at bedtime  methylphenidate 20 milliGRAM(s) Oral daily  methylphenidate 10 milliGRAM(s) Oral daily  metoprolol tartrate Injectable 5 milliGRAM(s) IV Push every 6 hours  mirtazapine 7.5 milliGRAM(s) Oral daily  piperacillin/tazobactam IVPB.- 3.375 Gram(s) IV Intermittent once  piperacillin/tazobactam IVPB.. 3.375 Gram(s) IV Intermittent every 8 hours  potassium chloride  10 mEq/100 mL IVPB 10 milliEquivalent(s) IV Intermittent every 1 hour  sodium chloride 0.9%. 1000 milliLiter(s) (60 mL/Hr) IV Continuous <Continuous>    MEDICATIONS  (PRN):  acetaminophen     Tablet .. 650 milliGRAM(s) Oral every 6 hours PRN Temp greater or equal to 38C (100.4F), Mild Pain (1 - 3)  aluminum hydroxide/magnesium hydroxide/simethicone Suspension 30 milliLiter(s) Oral every 4 hours PRN Dyspepsia  dextrose Oral Gel 15 Gram(s) Oral once PRN Blood Glucose LESS THAN 70 milliGRAM(s)/deciliter  ondansetron Injectable 4 milliGRAM(s) IV Push every 8 hours PRN Nausea and/or Vomiting      Allergies    No Known Allergies    Intolerances        Social History:  Tobacco: denies   EtOH: denies   Recreational drug use: denies   Lives with: wife   Ambulates: bedbound, has a nurse aid 8-10 daily   Vaccinations: Fully COVID-19 vaccinated (2022 01:11)      REVIEW OF SYSTEMS: pt is awake, mumble  ROS Unable to obtain due to - [  ] Dementia  [  ] Lethargy [x ] Drowsy [  ] Sedated [  ] non verbal    Vital Signs Last 24 Hrs  T(C): 36.8 (2022 13:38), Max: 39.9 (2022 05:45)  T(F): 98.2 (2022 13:38), Max: 103.8 (2022 05:45)  HR: 90 (2022 13:38) (70 - 120)  BP: 116/66 (2022 13:38) (91/58 - 178/95)  BP(mean): --  RR: 20 (2022 13:38) (18 - 24)  SpO2: 94% (2022 08:15) (89% - 96%)    Parameters below as of 2022 13:38  Patient On (Oxygen Delivery Method): nasal cannula  O2 Flow (L/min): 2    Finger Stick      PHYSICAL EXAM:  GENERAL:  [ x ] NAD , [ x ] well appearing, [  ] Agitated, [x  ] Drowsy,  [x  ] Lethargy, [  ] confused   HEAD:  [ x ] Normal, [  ] Other  EYES:  [x  ] EOMI, [ x ] PERRLA, [x  ] conjunctiva and sclera clear normal, [  ] Other,  [ x ] Pallor,[  ] Discharge  ENMT:  [ x ] Normal, [x  ] Dry  mucous membranes, [ x ] Good dentition, [ x ] No Thrush  NECK:  [ x ] Supple, [ x ] No JVD, [ x ] Normal thyroid, [  ] Lymphadenopathy [  ] Other  CHEST/LUNG:  [ x ] Clear to auscultation bilaterally, [ x ] Breath Sounds equal B/L / Decrease, [x  ] poor effort  [ x ] No rales, [ x ] No rhonchi  [x  ]  No wheezing,   HEART:  [ x ] Regular rate and rhythm, [  ] tachycardia, [  ] Bradycardia,  [  ] irregular  [ x ] No murmurs, No rubs, No gallops, [  ] PPM in place (Mfr:  )  ABDOMEN: [ x ] Pt repeating "ouch" when abdomen pressed, continues to repeat ouch even after finished with abd exam. [ x ] Soft, [ x ] Nondistended, [ x ] No mass, [x  ] Bowel sounds present, [x  ] obese  NERVOUS SYSTEM:  [  ] Alert & Oriented x , [ x ] Nonfocal  [  ] Confusion  [  ] Encephalopathic [  ] Sedated [  ] Unable to assess, [  ] Dementia [ x ] Other- awake, does NOT follow all direction  EXTREMITIES: [ x ] 2+ Peripheral Pulses, No clubbing, No cyanosis,  [  ] edema B/L lower EXT. [ x ] PVD stasis skin changes B/L Lower EXT, [  ] wound  LYMPH: No lymphadenopathy noted  SKIN:  [  ] No rashes or lesions, [  ] Pressure Ulcers, [  ] ecchymosis, [  ] Skin Tears, [x  ] Other- dry skin, with Scabs all EXT    DIET: Diet, NPO:   Except Medications (22 @ 02:11)      LABS:                          11.6   14.26 )-----------( 149      ( 2022 08:45 )             36.0     2022 08:45    146    |  117    |  11     ----------------------------<  158    3.4     |  22     |  0.82     Ca    8.0        2022 08:45        < from: MR Head No Cont (22 @ 16:15) >      IMPRESSION:    1. Left lateral convexity subacute to long-standing subdural hematoma,   unchanged from 2022, increased from 10/20/2022    2. Left posterior parafalcine subacute to long-standing subdural   hematoma, unchanged since 10/20/2022    3. Left posterior convexity subarachnoid space hemorrhage (favored over   artifact)    4. Right frontal and right temporal tip focal encephalomalacia consistent   with posttraumatic etiology    5. Severe cerebral hemispheric white matter abnormality likely reflects   both ischemia and posttraumatic injury    6. Diffuse brain volume loss greater than typical for age consistent with   Central cerebral hemispheric white matter volume loss      < end of copied text >                          12.2   25.02 )-----------( 247      ( 2022 06:37 )             36.7     2022 06:37    143    |  110    |  10     ----------------------------<  184    3.2     |  24     |  0.97     Ca    8.3        2022 06:37  Phos  2.3       2022 06:37  Mg     1.8       2022 06:37    TPro  5.2    /  Alb  2.3    /  TBili  0.7    /  DBili  x      /  AST  11     /  ALT  15     /  AlkPhos  87     2022 06:37    PT/INR - ( 2022 22:10 )   PT: 12.7 sec;   INR: 1.08 ratio         PTT - ( 2022 22:10 )  PTT:34.9 sec  Urinalysis Basic - ( 2022 08:26 )    Color: Yellow / Appearance: Slightly Turbid / S.010 / pH: x  Gluc: x / Ketone: Small  / Bili: Negative / Urobili: 1   Blood: x / Protein: 100 / Nitrite: Negative   Leuk Esterase: Moderate / RBC: >50 /HPF / WBC >50   Sq Epi: x / Non Sq Epi: Few / Bacteria: Few        Culture Results:   No growth to date. ( @ 22:10)  Culture Results:   <10,000 CFU/mL Normal Urogenital Angle ( @ 22:10)  Culture Results:   No growth to date. ( @ 22:05)      culture blood  -- .Blood Blood-Venous  @ 22:10    culture urine  --   @ 22:10  culture blood  -- .Blood Blood-Venous  @ 22:05    culture urine  --   @ 22:05    Culture - Urine (collected 2022 22:10)  Source: Catheterized Catheterized  Final Report (2022 07:27):    <10,000 CFU/mL Normal Urogenital Angle    Culture - Blood (collected 2022 22:10)  Source: .Blood Blood-Venous  Preliminary Report (2022 02:03):    No growth to date.    Culture - Blood (collected 2022 22:05)  Source: .Blood Blood-Venous  Preliminary Report (2022 02:03):    No growth to date.         Anemia Panel:  Vitamin B12, Serum: 656 pg/mL (22 @ 06:37)    RADIOLOGY & ADDITIONAL TESTS:  < from: Xray Chest 1 View- PORTABLE-Urgent (Xray Chest 1 View- PORTABLE-Urgent .) (22 @ 13:02) >    ACC: [id number] EXAM:  XR CHEST PORTABLE URGENT 1V                          PROCEDURE DATE:  2022          INTERPRETATION:  AP semierect chest on 2022 at 12:46 PM.   Patient has fever.    Heart magnified by technique.    There is aright lower perihilar infiltrate in scattered fashion which is   new since .    IMPRESSION: Right-sided infiltrate presently seen.    < end of copied text >  CXR      HEALTH ISSUES - PROBLEM Dx:  AMS (altered mental status)    H/O spontaneous intraparenchymal intracranial hemorrhage    Chronic atrial fibrillation    HTN (hypertension)    Mild fever    Motor system disease    History of diabetes mellitus    Peripheral neuropathy    Major depression    Need for prophylactic measure      Consultant(s) Notes Reviewed:  [x  ] YES     Care Discussed with [X] Consultants  [ x ] Patient  [x  ] Family - wife Marj [  ] HCP [  ]   [  ] Social Service  [ x ] RN, [  ] Physical Therapy,[  ] Palliative care team  DVT PPX: [  ] Lovenox, [  ] S C Heparin, [  ] Coumadin, [  ] Xarelto, [  ] Eliquis, [  ] Pradaxa, [  ] IV Heparin drip, [ x ] SCD [  ] Contraindication 2 to GI Bleed,[  ] Ambulation [  ] Contraindicated 2 to  bleed [x  ] Contraindicated 2 to Brain Bleed  Advanced directive: [  ] None, [x  ] DNR/DNI

## 2022-11-25 NOTE — PROGRESS NOTE ADULT - PROBLEM SELECTOR PLAN 2
Acute on chronic, per wife pt has been progressively deteriorating, reactive to painful stimulus only, prior used to articulate some words.  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  Mild mass effect with sulcal effacement and partial compression of the  left lateral ventricle. No new acute intracranial hemorrhage.  - Neuro Dr. Main recs: AMS/SDH  - GI Dr. Kelly -NO PEG as per Family   - Fall risk protocol, OOB with assistance  - PT /OT consulted  - Palliative care consulted for GOC-DNR/DNI Acute on chronic, per wife pt has been progressively deteriorating, reactive to painful stimulus only, prior used to articulate some words.  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  Mild mass effect with sulcal effacement and partial compression of the  left lateral ventricle. No new acute intracranial hemorrhage.  - Neuro Dr. Main recs: AMS/SDH, MRI today  - GI Dr. Kelly -NO PEG as per Family   - Fall risk protocol, OOB with assistance  - PT /OT consulted  - Palliative care consulted for GOC-DNR/DNI

## 2022-11-25 NOTE — SWALLOW BEDSIDE ASSESSMENT ADULT - SLP PERTINENT HISTORY OF CURRENT PROBLEM
Pt known to speech tx from previous admission had MBS 7/5/22 Rx "Regular and thin liquids, via single/small cup sips only, as tolerated".  Pt recently seen at [facility] for speech & swallow assessments rx "Easy to chew with thin liquids with feeding assistance and tray set up to cup up food; Maintain aspiration precautions. Pt also presents with cognitive linguistic deficits therefore consider Outpt speech language therapy if skills not deemed to be at baseline."  Swallow eval attempted 11/23/22 and this morning, however, pt not accepting PO trials, see notes for details.

## 2022-11-25 NOTE — PROGRESS NOTE ADULT - PROBLEM SELECTOR PLAN 3
Pt admitted from 10/19 to 10/22/2022 at [facility] for SDHs/p adexxa. Off of AC.   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  Mild mass effect with sulcal effacement and partial compression of the  left lateral ventricle. No new acute intracranial hemorrhage.  - Neuro Dr. Main follow up Pt admitted from 10/19 to 10/22/2022 at [facility] for SDHs/p adexxa. Off of AC.   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  Mild mass effect with sulcal effacement and partial compression of the  left lateral ventricle. No new acute intracranial hemorrhage.  - Neuro Dr. Main, recs appreciated

## 2022-11-25 NOTE — PROGRESS NOTE ADULT - PROBLEM SELECTOR PLAN 8
Chronic   - Continue home gabapentin 400 mg TID   - Oral meds held for now as pt is NPO Chronic   - Continue home gabapentin 400 mg TID   - Oral meds held for now Chronic   - Home med: gabapentin 400 mg TID   - Restart PO meds

## 2022-11-25 NOTE — OCCUPATIONAL THERAPY INITIAL EVALUATION ADULT - LEVEL OF INDEPENDENCE: BED TO CHAIR, REHAB EVAL
pt dependent, bedbound at baseline and requires use of a mechanical lift/unable to perform
total assist lift at baseline/unable to perform

## 2022-11-25 NOTE — OCCUPATIONAL THERAPY INITIAL EVALUATION ADULT - LEVEL OF INDEPENDENCE: SUPINE/SIT, REHAB EVAL
dependent (less than 25% patients effort)
pt dependent, bedbound at baseline and requires use of a mechanical lift/unable to perform

## 2022-11-25 NOTE — OCCUPATIONAL THERAPY INITIAL EVALUATION ADULT - VISUAL ASSESSMENT: TRACKING
unable to assess due to decreased cognition and command following, pt making eye contact with OT when OT initially woke pt

## 2022-11-25 NOTE — OCCUPATIONAL THERAPY INITIAL EVALUATION ADULT - PERTINENT HX OF CURRENT PROBLEM, REHAB EVAL
72M BPH CAD, HTN, HLD, Afib, SDH s/p mechanical fall, recurrent ICH when on AC brought today for weakness. History obtained from pt's wife Love and chart review given patient medical condition. Pt admitted from 10/19 to 10/22/2022 at [facility] for SHD s/p adexxa. Pt's wife endorses that patient has been progressively deteriorating, states that now only is reactive to painful stimulus, prior used to articulate some words. Patient admitted for acute on chronic AMS.   CT head: Chronic subdural hematomas overlying the left side of the posterior falx measuring up to 1.7 cm and 1.5 cm overlying the left temporal convexity. No new acute intracranial hemorrhage.
72M BPH CAD, HTN, HLD, Afib, SDH s/p mechanical fall, recurrent ICH when on AC brought today for weakness. History obtained from pt's wife Love and chart review given patient medical condition. Pt admitted from 10/19 to 10/22/2022 at [facility] for SHD s/p adexxa. Pt's wife endorses that patient has been progressively deteriorating, states that now only is reactive to painful stimulus, prior used to articulate some words. Patient completed 2 weeks of outpatient PT, has 8-10 hours nurse aid daily. Temp at home 99.9. Wife assist with feeding, was seen by neuro who start mirtazapine and sent for neurologic work up.

## 2022-11-25 NOTE — OCCUPATIONAL THERAPY INITIAL EVALUATION ADULT - RANGE OF MOTION EXAMINATION
Limited by tone, pt stating "no!" when PROM attempted bilateral UEs. Pt limited by tone bilaterally. No AROM noted bilateral UEs except gross grasp.

## 2022-11-25 NOTE — OCCUPATIONAL THERAPY INITIAL EVALUATION ADULT - ADDITIONAL COMMENTS
Per EMR from prior hospitalization Oct 2022, "Pt. lives with spouse in a private home, ramp access. Prior to admission, pt. was totally dependent with ADLs. Pt. owns a wheelchair, lift device and hospital bed at home." As per EMR, pt with HHA and is bedbound.
Per EMR from prior hospitalization Oct 2022, "Pt. lives with spouse in a private home, ramp access. Prior to admission, pt. was totally dependent with ADLs. Pt. owns a wheelchair, lift device and hospital bed at home."

## 2022-11-25 NOTE — PROGRESS NOTE ADULT - PROBLEM SELECTOR PLAN 7
Per wife patient has history of T2DM but resolved, not on meds.   - UA showing glucosuria   - HbA1c 9.3  - Low Insulin sliding scale  - Accu checks w/ hypoglycemic protocol  - Pt NPO for now Per wife patient has history of T2DM but resolved, not on meds.   - UA showing glucosuria   - HbA1c 9.3  - Low Insulin sliding scale  - Accu checks w/ hypoglycemic protocol  - Pt on med thick liquid diet

## 2022-11-25 NOTE — OCCUPATIONAL THERAPY INITIAL EVALUATION ADULT - MD ORDER
OT evaluate and treat
MD orders for OT received, chart reviewed and contents noted. RN consulted prior to session, stated pt OK to participate.

## 2022-11-25 NOTE — PROGRESS NOTE ADULT - PROBLEM SELECTOR PLAN 4
Chronic   - EKG shows Afib with RVR @ 110 bpm, Right axis deviation   - home Cardizem 120 mg CD qd ON HOLD   - PO meds held for now  - Continue IV lopressor 5mg q6  - Replete mag and phos as needed  - Outpatient cardio Dr. Goldsmith N   NO AC 2/2 recent Brain Hemorrhage / SDH Chronic   - EKG shows Afib with RVR @ 110 bpm, Right axis deviation on admission  - Home Cardizem 120 mg CD qd  - Restart PO meds  - DC IV lopressor 5mg q6  - Replete mag and phos as needed  - Outpatient cardio Dr. Goldsmith N   NO AC 2/2 recent Brain Hemorrhage / SDH

## 2022-11-25 NOTE — SWALLOW BEDSIDE ASSESSMENT ADULT - SLP PERTINENT HISTORY OF CURRENT PROBLEM
Pt known to speech tx from previous admission had MBS 7/5/22 Rx "Regular and thin liquids, via single/small cup sips only, as tolerated".  Pt recently seen at [facility] for speech & swallow assessments rx "Easy to chew with thin liquids with feeding assistance and tray set up to cup up food; Maintain aspiration precautions. Pt also presents with cognitive linguistic deficits therefore consider Outpt speech language therapy if skills not deemed to be at baseline." Pt known to speech tx from previous admission had MBS 7/5/22 Rx "Regular and thin liquids, via single/small cup sips only, as tolerated".  Pt recently seen at [facility] for speech & swallow assessments rx "Easy to chew with thin liquids with feeding assistance and tray set up to cup up food; Maintain aspiration precautions. Pt also presents with cognitive linguistic deficits therefore consider Outpt speech language therapy if skills not deemed to be at baseline."  Swallow eval attempted 11/23/22, however, pt not accepting PO trials, see notes for details.

## 2022-11-25 NOTE — PROGRESS NOTE ADULT - PROBLEM SELECTOR PLAN 6
Chronic, on ritalin 20 mg @ 8 am and 10 mg 13:00, will continue   - Fall and aspiration precautions   - Change position every 2 hours   - PT/OT consulted

## 2022-11-25 NOTE — SWALLOW BEDSIDE ASSESSMENT ADULT - SLP GENERAL OBSERVATIONS
Pt received in bed A&A Ox0, positioned upright for eval, reduced cognition, reduced command following, +O2NC, wife present which improved pt participation and PO acceptance, intermittent verbalization of "Ow and No", pain scale 0/10 pre & post eval via non verbal pain scale

## 2022-11-25 NOTE — OCCUPATIONAL THERAPY INITIAL EVALUATION ADULT - NSOTDISCHREC_GEN_A_CORE
Home with 24/7 assist, HCOT. No skilled OT needs during inpatient stay. Pt inconsistently follows commands, is dependent for ADLs and bedbound at baseline, no skilled OT goals. Recommend HCOT upon d/c, discussed with CM (Vivek)./Home OT

## 2022-11-25 NOTE — PROGRESS NOTE ADULT - SUBJECTIVE AND OBJECTIVE BOX
[facility]  Lorenzo Adam PA-C  [address] [place] [address]  [phone]      INTERVAL HPI/OVERNIGHT EVENTS:  Pt s/e  Palliative eval noted    MEDICATIONS  (STANDING):  atorvastatin 40 milliGRAM(s) Oral at bedtime  dextrose 5% + sodium chloride 0.45%. 1000 milliLiter(s) (500 mL/Hr) IV Continuous <Continuous>  dextrose 5%. 1000 milliLiter(s) (100 mL/Hr) IV Continuous <Continuous>  dextrose 5%. 1000 milliLiter(s) (50 mL/Hr) IV Continuous <Continuous>  dextrose 50% Injectable 25 Gram(s) IV Push once  dextrose 50% Injectable 12.5 Gram(s) IV Push once  dextrose 50% Injectable 25 Gram(s) IV Push once  diltiazem    milliGRAM(s) Oral daily  gabapentin 400 milliGRAM(s) Oral three times a day  glucagon  Injectable 1 milliGRAM(s) IntraMuscular once  hydrALAZINE 50 milliGRAM(s) Oral every 8 hours  insulin lispro (ADMELOG) corrective regimen sliding scale   SubCutaneous every 6 hours  latanoprost 0.005% Ophthalmic Solution 1 Drop(s) Both EYES at bedtime  losartan 50 milliGRAM(s) Oral two times a day  methylphenidate 20 milliGRAM(s) Oral daily  methylphenidate 10 milliGRAM(s) Oral daily  metoprolol tartrate Injectable 5 milliGRAM(s) IV Push every 6 hours  mirtazapine 7.5 milliGRAM(s) Oral daily  sodium chloride 0.9%. 1000 milliLiter(s) (60 mL/Hr) IV Continuous <Continuous>    MEDICATIONS  (PRN):  acetaminophen     Tablet .. 650 milliGRAM(s) Oral every 6 hours PRN Temp greater or equal to 38C (100.4F), Mild Pain (1 - 3)  aluminum hydroxide/magnesium hydroxide/simethicone Suspension 30 milliLiter(s) Oral every 4 hours PRN Dyspepsia  dextrose Oral Gel 15 Gram(s) Oral once PRN Blood Glucose LESS THAN 70 milliGRAM(s)/deciliter  ondansetron Injectable 4 milliGRAM(s) IV Push every 8 hours PRN Nausea and/or Vomiting      Allergies    No Known Allergies      PHYSICAL EXAM:   Vital Signs:  Vital Signs Last 24 Hrs  T(C): 37.2 (2022 08:15), Max: 39.9 (2022 05:45)  T(F): 99 (2022 08:15), Max: 103.8 (2022 05:45)  HR: 98 (2022 08:15) (70 - 120)  BP: 94/62 (2022 08:15) (91/58 - 178/95)  BP(mean): --  RR: 20 (2022 08:15) (18 - 24)  SpO2: 94% (2022 08:15) (89% - 96%)    Parameters below as of 2022 08:15  Patient On (Oxygen Delivery Method): nasal cannula  O2 Flow (L/min): 2    GENERAL:  Appears stated age  ABDOMEN:  Soft, non-tender, non-distended  NEURO:  Alert    LABS:                        12.2   25.02 )-----------( 247      ( 2022 06:37 )             36.7     11-24    143  |  110<H>  |  10  ----------------------------<  184<H>  3.2<L>   |  24  |  0.97    Ca    8.3<L>      2022 06:37  Phos  2.3     11-24  Mg     1.8     11-24    TPro  5.2<L>  /  Alb  2.3<L>  /  TBili  0.7  /  DBili  x   /  AST  11<L>  /  ALT  15  /  AlkPhos  87  11-24    PT/INR - ( 2022 22:10 )   PT: 12.7 sec;   INR: 1.08 ratio         PTT - ( 2022 22:10 )  PTT:34.9 sec  Urinalysis Basic - ( 2022 08:26 )    Color: Yellow / Appearance: Slightly Turbid / S.010 / pH: x  Gluc: x / Ketone: Small  / Bili: Negative / Urobili: 1   Blood: x / Protein: 100 / Nitrite: Negative   Leuk Esterase: Moderate / RBC: >50 /HPF / WBC >50   Sq Epi: x / Non Sq Epi: Few / Bacteria: Few

## 2022-11-25 NOTE — SWALLOW BEDSIDE ASSESSMENT ADULT - ORAL PHASE
intermittently, suspect posterior loss, 1 instance of oral holding requiring cues to elicit swallow/Delayed oral transit time intermittently, suspect posterior loss/Delayed oral transit time

## 2022-11-25 NOTE — PROGRESS NOTE ADULT - ATTENDING COMMENTS
72 M BPH CAD, HTN, HLD, Afib, SDH s/p mechanical fall, recurrent ICH when on AC brought today for weakness. History obtained from pt's wife Love and chart review given patient medical condition. Pt admitted from 10/19 to 10/22/2022 at [facility] for SHD s/p adexxa. Pt's wife endorses that patient has been progressively deteriorating, states that now only is reactive to painful stimulus, prior used to articulate some words. Patient admitted for acute on chronic AMS.  Pt seen, examined, case & care plan d/w pt, residents at detail.  Neurology-DR Main follow up   -ID DR giles-continue IV Zosyn   -GI-DR Kaye -NO PEG , S & S --->Moderately Thicketed liquid  Pureed   Palliative Care Eval -DNR/DNI   D/W Wife at detail at bed side,  - aspiration Precaution.  SCD   PT/OT  Eval   Social service Eval.  Total care time 45 minutes.

## 2022-11-26 ENCOUNTER — TRANSCRIPTION ENCOUNTER (OUTPATIENT)
Age: 72
End: 2022-11-26

## 2022-11-26 DIAGNOSIS — R13.10 DYSPHAGIA, UNSPECIFIED: ICD-10-CM

## 2022-11-26 LAB
ANION GAP SERPL CALC-SCNC: 7 MMOL/L — SIGNIFICANT CHANGE UP (ref 5–17)
BASOPHILS # BLD AUTO: 0 K/UL — SIGNIFICANT CHANGE UP (ref 0–0.2)
BASOPHILS NFR BLD AUTO: 0 % — SIGNIFICANT CHANGE UP (ref 0–2)
BUN SERPL-MCNC: 10 MG/DL — SIGNIFICANT CHANGE UP (ref 7–23)
CALCIUM SERPL-MCNC: 8.1 MG/DL — LOW (ref 8.5–10.1)
CHLORIDE SERPL-SCNC: 118 MMOL/L — HIGH (ref 96–108)
CO2 SERPL-SCNC: 23 MMOL/L — SIGNIFICANT CHANGE UP (ref 22–31)
CREAT SERPL-MCNC: 0.68 MG/DL — SIGNIFICANT CHANGE UP (ref 0.5–1.3)
EGFR: 100 ML/MIN/1.73M2 — SIGNIFICANT CHANGE UP
EOSINOPHIL # BLD AUTO: 0 K/UL — SIGNIFICANT CHANGE UP (ref 0–0.5)
EOSINOPHIL NFR BLD AUTO: 0 % — SIGNIFICANT CHANGE UP (ref 0–6)
GLUCOSE SERPL-MCNC: 99 MG/DL — SIGNIFICANT CHANGE UP (ref 70–99)
HCT VFR BLD CALC: 32.3 % — LOW (ref 39–50)
HGB BLD-MCNC: 10.5 G/DL — LOW (ref 13–17)
LYMPHOCYTES # BLD AUTO: 0.41 K/UL — LOW (ref 1–3.3)
LYMPHOCYTES # BLD AUTO: 3 % — LOW (ref 13–44)
MAGNESIUM SERPL-MCNC: 1.8 MG/DL — SIGNIFICANT CHANGE UP (ref 1.6–2.6)
MCHC RBC-ENTMCNC: 26 PG — LOW (ref 27–34)
MCHC RBC-ENTMCNC: 32.5 GM/DL — SIGNIFICANT CHANGE UP (ref 32–36)
MCV RBC AUTO: 80 FL — SIGNIFICANT CHANGE UP (ref 80–100)
MONOCYTES # BLD AUTO: 0.14 K/UL — SIGNIFICANT CHANGE UP (ref 0–0.9)
MONOCYTES NFR BLD AUTO: 1 % — LOW (ref 2–14)
NEUTROPHILS # BLD AUTO: 13.28 K/UL — HIGH (ref 1.8–7.4)
NEUTROPHILS NFR BLD AUTO: 92 % — HIGH (ref 43–77)
NRBC # BLD: SIGNIFICANT CHANGE UP /100 WBCS (ref 0–0)
PHOSPHATE SERPL-MCNC: 1.9 MG/DL — LOW (ref 2.5–4.5)
PLATELET # BLD AUTO: 149 K/UL — LOW (ref 150–400)
POTASSIUM SERPL-MCNC: 3.1 MMOL/L — LOW (ref 3.5–5.3)
POTASSIUM SERPL-SCNC: 3.1 MMOL/L — LOW (ref 3.5–5.3)
RBC # BLD: 4.04 M/UL — LOW (ref 4.2–5.8)
RBC # FLD: 15.9 % — HIGH (ref 10.3–14.5)
SODIUM SERPL-SCNC: 148 MMOL/L — HIGH (ref 135–145)
WBC # BLD: 13.83 K/UL — HIGH (ref 3.8–10.5)
WBC # FLD AUTO: 13.83 K/UL — HIGH (ref 3.8–10.5)

## 2022-11-26 RX ORDER — PIPERACILLIN AND TAZOBACTAM 4; .5 G/20ML; G/20ML
3.38 INJECTION, POWDER, LYOPHILIZED, FOR SOLUTION INTRAVENOUS
Qty: 0 | Refills: 0 | DISCHARGE
Start: 2022-11-26

## 2022-11-26 RX ORDER — DEXTROSE 50 % IN WATER 50 %
25 SYRINGE (ML) INTRAVENOUS ONCE
Refills: 0 | Status: COMPLETED | OUTPATIENT
Start: 2022-11-26 | End: 2022-11-26

## 2022-11-26 RX ORDER — POTASSIUM CHLORIDE 20 MEQ
10 PACKET (EA) ORAL
Refills: 0 | Status: COMPLETED | OUTPATIENT
Start: 2022-11-26 | End: 2022-11-27

## 2022-11-26 RX ORDER — DEXTROSE MONOHYDRATE, SODIUM CHLORIDE, AND POTASSIUM CHLORIDE 50; .745; 4.5 G/1000ML; G/1000ML; G/1000ML
1000 INJECTION, SOLUTION INTRAVENOUS
Refills: 0 | Status: DISCONTINUED | OUTPATIENT
Start: 2022-11-26 | End: 2022-11-27

## 2022-11-26 RX ORDER — DILTIAZEM HCL 120 MG
120 CAPSULE, EXT RELEASE 24 HR ORAL DAILY
Refills: 0 | Status: DISCONTINUED | OUTPATIENT
Start: 2022-11-27 | End: 2022-11-29

## 2022-11-26 RX ORDER — SODIUM CHLORIDE 9 MG/ML
1000 INJECTION, SOLUTION INTRAVENOUS
Refills: 0 | Status: DISCONTINUED | OUTPATIENT
Start: 2022-11-26 | End: 2022-11-26

## 2022-11-26 RX ORDER — DEXTROSE MONOHYDRATE, SODIUM CHLORIDE, AND POTASSIUM CHLORIDE 50; .745; 4.5 G/1000ML; G/1000ML; G/1000ML
1000 INJECTION, SOLUTION INTRAVENOUS
Qty: 0 | Refills: 0 | DISCHARGE
Start: 2022-11-26

## 2022-11-26 RX ORDER — DEXTROSE 50 % IN WATER 50 %
12.5 SYRINGE (ML) INTRAVENOUS ONCE
Refills: 0 | Status: COMPLETED | OUTPATIENT
Start: 2022-11-26 | End: 2022-11-26

## 2022-11-26 RX ADMIN — Medication 120 MILLIGRAM(S): at 12:39

## 2022-11-26 RX ADMIN — PIPERACILLIN AND TAZOBACTAM 25 GRAM(S): 4; .5 INJECTION, POWDER, LYOPHILIZED, FOR SOLUTION INTRAVENOUS at 06:12

## 2022-11-26 RX ADMIN — Medication 2: at 00:08

## 2022-11-26 RX ADMIN — Medication 1: at 18:25

## 2022-11-26 RX ADMIN — Medication 25 GRAM(S): at 06:30

## 2022-11-26 RX ADMIN — Medication 20 MILLIGRAM(S): at 12:33

## 2022-11-26 RX ADMIN — Medication 12.5 GRAM(S): at 12:40

## 2022-11-26 RX ADMIN — Medication 10 MILLIGRAM(S): at 18:25

## 2022-11-26 RX ADMIN — PIPERACILLIN AND TAZOBACTAM 25 GRAM(S): 4; .5 INJECTION, POWDER, LYOPHILIZED, FOR SOLUTION INTRAVENOUS at 16:56

## 2022-11-26 NOTE — PROGRESS NOTE ADULT - SUBJECTIVE AND OBJECTIVE BOX
Neurology Follow up note    MELBA [id number]    HPI:  72M BPH CAD, HTN, HLD, Afib, SDH s/p mechanical fall, recurrent ICH when on AC brought today for weakness. History obtained from pt's wife Love and chart review given patient medical condition. Pt admitted from 10/19 to 10/22/2022 at [facility] for SHD s/p adexxa. Pt's wife endorses that patient has been progressively deteriorating, states that now only is reactive to painful stimulus, prior used to articulate some words. Patient completed 2 weeks of outpatient PT, has 8-10 hours nurse aid daily. Temp at home 99.9. Wife assist with feeding, was seen by neuro who start mirtazapine and sent for neurologic work up.     ED course  VS: Afebrile, HR: 96, BP: 154/97, SpO2: 97% RA RR 19  Labs significant for glucose 227, Alkaline phosphate 227, UA shows glucose 250  CT head showed chronic subdural hematomas overlying the left side of the posterior falx measuring up to 1.7 cm and 1.5 cm overlying the left temporal convexity.  Mild mass effect with sulcal effacement and partial compression of the  left lateral ventricle. No new acute intracranial hemorrhage.  EKG shows Afib with RVR @ 110 bpm, Right axis deviation    (23 Nov 2022 01:11)      Interval History -no new events    Patient is seen, chart was reviewed and case was discussed with the treatment team.  Pt is not in any distress.   Lying on bed comfortably.       Vital Signs Last 24 Hrs  T(C): 36.6 (26 Nov 2022 13:02), Max: 36.8 (26 Nov 2022 04:32)  T(F): 97.9 (26 Nov 2022 13:02), Max: 98.2 (26 Nov 2022 04:32)  HR: 104 (26 Nov 2022 13:02) (86 - 109)  BP: 128/87 (26 Nov 2022 13:02) (113/73 - 128/87)  BP(mean): --  RR: 18 (26 Nov 2022 13:02) (18 - 19)  SpO2: 97% (26 Nov 2022 13:02) (97% - 97%)    Parameters below as of 26 Nov 2022 13:02  Patient On (Oxygen Delivery Method): nasal cannula  O2 Flow (L/min): 2            REVIEW OF SYSTEMS:    unobtainable 2/2 poor mentation  not in any distress    On Neurological Examination:    Mental Status - Pt opened eyes after calling his name  not following any simple commands     Speech -  Non verbal    Cranial Nerves - Pupils 3 mm equal and reactive to light, extraocular eye movements intact.   Pt has no facial asymmetry.     Muscle tone -increased    Motor Exam - 2/5 of UE  LE 1/5    Sensory Exam -  Pt withdraws all extremities equally on stimulation.      Deep tendon Reflexes - 2 plus all over.    Neck Supple -  Yes.       MEDICATIONS  (STANDING):  dextrose 5% + sodium chloride 0.45% with potassium chloride 20 mEq/L 1000 milliLiter(s) (80 mL/Hr) IV Continuous <Continuous>  dextrose 5%. 1000 milliLiter(s) (100 mL/Hr) IV Continuous <Continuous>  dextrose 5%. 1000 milliLiter(s) (50 mL/Hr) IV Continuous <Continuous>  dextrose 50% Injectable 25 Gram(s) IV Push once  dextrose 50% Injectable 12.5 Gram(s) IV Push once  dextrose 50% Injectable 25 Gram(s) IV Push once  diltiazem    milliGRAM(s) Oral daily  gabapentin 400 milliGRAM(s) Oral three times a day  glucagon  Injectable 1 milliGRAM(s) IntraMuscular once  insulin lispro (ADMELOG) corrective regimen sliding scale   SubCutaneous every 6 hours  latanoprost 0.005% Ophthalmic Solution 1 Drop(s) Both EYES at bedtime  methylphenidate 20 milliGRAM(s) Oral daily  methylphenidate 10 milliGRAM(s) Oral daily  piperacillin/tazobactam IVPB.. 3.375 Gram(s) IV Intermittent every 8 hours  potassium chloride  10 mEq/100 mL IVPB 10 milliEquivalent(s) IV Intermittent every 1 hour    MEDICATIONS  (PRN):  acetaminophen  Suppository .. 650 milliGRAM(s) Rectal every 6 hours PRN Temp greater or equal to 38C (100.4F)  aluminum hydroxide/magnesium hydroxide/simethicone Suspension 30 milliLiter(s) Oral every 4 hours PRN Dyspepsia  dextrose Oral Gel 15 Gram(s) Oral once PRN Blood Glucose LESS THAN 70 milliGRAM(s)/deciliter  ondansetron Injectable 4 milliGRAM(s) IV Push every 8 hours PRN Nausea and/or Vomiting    Allergies    No Known Allergies    Intolerances                            10.5   13.83 )-----------( 149      ( 26 Nov 2022 07:52 )             32.3     Hemoglobin A1C:     Vitamin B12     RADIOLOGY  < from: MR Head No Cont (11.25.22 @ 16:15) >    ACC: [id number] EXAM:  MR BRAIN                          PROCEDURE DATE:  11/25/2022          INTERPRETATION:  MR of the brain without gadolinium contrast    CLINICAL INFORMATION:   CVA  hx of TBI/SDH/AMS    TECHNIQUE:   Sagittal and axial T1-weighted images, axial FLAIR images,   axial susceptibility weighted images, axial T2-weighted images and axial   diffusion weighted images of the brain were obtained.    COMPARISON:   CT head 11/22/2022, 10/20/2022 and previous available for   review.    FINDINGS:    BRAIN and CSF SPACES: The patient is again noted for multifocal subdural   hemorrhage distorting the left cerebral hemisphere. The larger component   extends along the lateral convexity from near the temporal tip to the   temporal occipitaljunction and measures up to 1.4 cm thickness when   measured perpendicular to the inner table of the calvarium. This   collection is heterogeneous in signal intensity within septations and   dependent layering. It displaces and compresses gyri of theleft temporal   lobe and largely effaces the temporal horn of the left lateral ventricle.   A noncontiguous collection overlies the left cerebral hemisphere medial   aspect along the left posterior falx. This collection measures up to 1.6   cm thickness. It displaces gyri of the left parietal lobe. This   collection has a thick rind that is low signal intensity on all pulse   sequences. This contributes to 2 deformity, displacement and partial   effacement of the atrium of the left lateral ventricle. This parafalcine   component is associated with downward extension to the left tentorium.   These hemorrhages are associated with only slight left-to-right   subfalcine herniation, less than 0.3 cm at the anterior septum.    Linear hyperintensities found within multiple cysts sulci of the left   cerebral hemisphere. This does not appear to be artifactual and is   consistent with subarachnoid space hemorrhage.    Focal encephalomalacia is again noted in the supraorbital right frontal   lobe and at the right temporal tip. Extensive gliotic signal   hyperintensity is noted on the long TR images throughout much of the   cerebral hemispheric white matter. There is asymmetric ex vacuo   dilatation of the right lateral ventricle particularly itstemporal horn.   Deep cerebral hemispheric white matter hyperintensity is also present in   the left cerebral hemisphere, consistent with considerable ischemic white   matter disease within each cerebral hemisphere. Similar ventral pontine   signal intensity also likely reflects long-standing ischemic white matter   disease.    VESSELS:   The vertebral and internal carotid arteries demonstrate   expected flow voids indicating their patency.  The right vertebral artery   is dominant caliber. The posterior circulation is tortuous.    HEAD AND NECK STRUCTURES:   The orbits are unremarkable.  Paranasal   sinuses are clear.  The nasal cavity demonstrates irregular deviation   nasal septum left-to-right.  The central skull base appears intact.  The  nasopharynx contains a small volume dependent fluid.  The temporal bones   appear clear of disease.  The calvarium demonstrates a right frontal   craniotomy defect.      IMPRESSION:    1. Left lateral convexity subacute to long-standing subdural hematoma,   unchanged from 11/22/2022, increased from 10/20/2022    2. Left posterior parafalcine subacute to long-standing subdural   hematoma, unchanged since 10/20/2022    3. Left posterior convexity subarachnoid space hemorrhage (favored over   artifact)    4. Right frontal and right temporal tip focal encephalomalacia consistent   with posttraumatic etiology    5. Severe cerebral hemispheric white matter abnormality likely reflects   both ischemia and posttraumatic injury    6. Diffuse brain volume loss greater than typical for age consistent with   Central cerebral hemispheric white matter volume loss    --- End of Report ---            ALBERT TONY MD; Attending Radiologist  This document has been electronically signed. Nov 25 2022  4:52PM      ASSESSMENT AND PLAN:    seen for ams-multifactorial     Subacute left lateral convexity(temporo occipital) subdural hematoma  left parafalcine subacute sdh  ME  hx of TBI/SDH  FEVER    called transfer centre and case dw dr Whitehead who reviewed brain scan and recommends transfer to [facility] for possible neurosurgical intervention/EEG  management dori kirkpatrick and patient wife  avoid ac  antibiotic as per ID  Physical therapy evaluation.  OOB to chair/ambulation with assistance only  Pain is accessed and addressed.  Would continue to follow.

## 2022-11-26 NOTE — PROGRESS NOTE ADULT - PROBLEM SELECTOR PLAN 1
Fever- Sepsis with Leukocytosis, Hypotension   Suspect  possible Aspiration PNA   - 11/22 Blood x2 and urine culture NGTD  - 11/24 CXR: Right-sided infiltrate; suspicion of aspiration PNA   - WBC downtrending now 13.83; continue to monitor  - Continue Zosyn IVPB q 8 hrs  - Continue on mod thick liqiuds w/ aspiration precautions  - IVF NS 60 ml/hr  - Tylenol 650 mg AK  Suppository   - ID Dr Boggs d/w -continue IV Zosyn Fever- Sepsis with Leukocytosis, Hypotension -Improving   Suspect  possible Aspiration PNA   - 11/22 Blood x2 and urine culture NGTD  - 11/24 CXR: Right-sided infiltrate; aspiration PNA   - WBC downtrending now 13.83; continue to monitor  - Continue Zosyn IVPB q 8 hrs  - Continue on mod thick liquids w/ aspiration precautions  - IVF D5 1/2  NS 80 ml/hr  - Tylenol 650 mg HI  Suppository   - ID Dr Boggs d/w -continue IV Zosyn

## 2022-11-26 NOTE — DISCHARGE NOTE PROVIDER - NSDCCPTREATMENT_GEN_ALL_CORE_FT
PRINCIPAL PROCEDURE  Procedure: Modified barium swallow  Findings and Treatment: Recommendations:   · Diagnostic Impressions	1. Moderate oral dysphagia for pureed and regular solids marked by adequate oral acceptance with prolonged mastication/manipulation, intermittent bolus hold requiring verbal cues to prompt transport with delayed transport and A-P transit time. Spillover into the oropharynx was visualized. Trace-mild lingual and palatal stasis visualized, which reduced with subsequent swallow.  2. Mild pharyngeal dysphagia for pureed and regular solids marked by delayed pharyngeal swallow trigger with reduced base of tongue retraction, reduced hyolaryngeal elevation, adequate epiglottic deflection, and reduced pharyngeal contractility resulting in trace stasis at the BOT, vallecula (greater with regular solids), posterior pharyngeal wall, and pyriform sinuses, which reduced with subsequent swallow. No laryngeal penetration/aspiration was visualized.   3. Attempted to provide PO trials of mildly thick liquids x2 and thin liquids x2; however patient presented with bolus hold and absent anterior-posterior transport, despite max verbal and tactile cues provided by SLP in attempt to facilitate transport. SLP provided max encouragement and multiple reattempts and upon clinician questioning asking patient to swallow, the patient persistently shook his head no and was not agreeable to additional trials (with suspected behavioral component due to reduced cognition). When patient was asked to expectorate bolus, patient presented with tight labial seal and shaking of head no. SLP therefore utilized yankauer suctioning to safely remove bolus from oral cavity. Of note, upon yankauer suctioning, posterior loss of thin liquids to the level of the pyriforms was visualized; however, when fluro was turned on no barium was visualized within the laryngeal vestibule.         SECONDARY PROCEDURE  Procedure: Modified barium swallow  Findings and Treatment: Continue  4. It should be noted that due to the patient's inconsistent ability to trigger a pharyngeal swallow, despite max cues, the patient is at an increased aspiration/choking risk. Of note, Patient was left in NAD upon completion of assessment.  · Recommended Consistencies	1. Defer continuation of PO diet level to MD given today's limited assessment due to the patient's inability to consistently trigger a pharyngeal swallow, despite max cues, ultimately requiring yankauer suctioning to safely remove bolus from oral cavity, as abovementioned.   2. Recommend continued GOC discussion with the patient's family regarding nutritional plan of care.  3. Maintain aspiration precautions and strict oral care  	  · Aspiration Precautions	yes  · Monitor for Signs of Aspiration	change of breathing pattern; oral hygiene; position upright (90Y); cough; gurgly voice; fever; pneumonia; throat clearing; upper respiratory infection  · Demonstrates Need for Referral to Another Service	Registered Dietitian; neurology; RD services to ensure adequate daily caloric nutritional intake; continued neurology work-up given reduced cognition  · Additional Recommendations	As per discussion with Dr. Lashae MD in agreement with abovementioned recommendations and discussed for this department to sign off at this time. Discussed to please reconsult this department as the patient's cognition improves/as deemed medically appropriate and pending GOC and MD in agreement with POC.       PRINCIPAL PROCEDURE  Procedure: Brain MRI  Findings and Treatment: ACC: [id number] EXAM:  MR BRAIN                        PROCEDURE DATE:  11/25/2022    INTERPRETATION:  MR of the brain without gadolinium contrast  CLINICAL INFORMATION:   CVA  hx of TBI/SDH/AMS  TECHNIQUE:   Sagittal and axial T1-weighted images, axial FLAIR images, axial susceptibility weighted images, axial T2-weighted images and axial diffusion weighted images of the brain were obtained.  COMPARISON:   CT head 11/22/2022, 10/20/2022 and previous available for review.  IMPRESSION:  1. Left lateral convexity subacute to long-standing subdural hematoma, unchanged from 11/22/2022, increased from 10/20/2022  2. Left posterior parafalcine subacute to long-standing subdural   hematoma, unchanged since 10/20/2022  3. Left posterior convexity subarachnoid space hemorrhage (favored over artifact)  4. Right frontal and right temporal tip focal encephalomalacia consistent with posttraumatic etiology  5. Severe cerebral hemispheric white matter abnormality likely reflects both ischemia and posttraumatic injury  6. Diffuse brain volume loss greater than typical for age consistent with Central cerebral hemispheric white matter volume loss  --- End of Report ---  ALBERT TONY MD; Attending Radiologist  This document has been electronically signed. Nov 25 2022  4:52PM        SECONDARY PROCEDURE  Procedure: Modified barium swallow  Findings and Treatment:     Procedure: MRI brain  Findings and Treatment:     Procedure: CT head wo con  Findings and Treatment: ACC: [id number] EXAM:  CT BRAIN                        PROCEDURE DATE:  11/22/2022    INTERPRETATION:  CT HEAD WITHOUT CONTRAST  INDICATION: 72 years old. Male. slurred speech x 1 week, recent TBI, SDH, and IPH.  COMPARISON: 10/20/2022.  TECHNIQUE: Noncontrast axial CT head was obtained from the skull base to vertex.  FINDINGS:  Chronic subdural hematomas overlying the left side of the posterior falx measuring up to 1.7 cm and 1.5 cm overlying the left temporal convexity. Mild mass effect with sulcal effacement and partial compression of the left lateral ventricle.  Redemonstration of encephalomalacia and gliosis of the right frontal lobe and right temporal lobe.  No acute intracranial hemorrhage or midline shift. No CT evidence of acute large vascular territory infarct. The ventricles and cortical sulci are prominent reflecting parenchymal volume loss. Patchy hypodensities in the periventricular white matter are nonspecific, but likely sequela of small vessel ischemic disease.  Mild mucosal thickening of the bilateral axillary sinuses. The mastoid air cells are well aerated. No displaced calvarial fracture.  IMPRESSION:  Chronic subdural hematomas overlying the left side of the posterior falx measuring up to 1.7 cm and 1.5 cm overlying the left temporal convexity.  No new acute intracranial hemorrhage.  --- End of Report ---  DANNIE VERAS MD; Attending Radiologist  This document has been electronically signed. Nov 22 202211:19PM

## 2022-11-26 NOTE — DISCHARGE NOTE PROVIDER - DETAILS OF MALNUTRITION DIAGNOSIS/DIAGNOSES
This patient has been assessed with a concern for Malnutrition and was treated during this hospitalization for the following Nutrition diagnosis/diagnoses:     -  11/24/2022: Moderate protein-calorie malnutrition

## 2022-11-26 NOTE — DISCHARGE NOTE PROVIDER - NSDCMRMEDTOKEN_GEN_ALL_CORE_FT
Cardizem  mg/24 hours oral capsule, extended release: 1 cap(s) orally once a day   Dextrose 5% with 0.45% NaCl and KCl 20 mEq/L intravenous solution: 1000 milliliter(s) intravenous   gabapentin 400 mg oral capsule: 1 cap(s) orally 3 times a day  hydrALAZINE 25 mg oral tablet: 2 tab(s) orally every 8 hours  latanoprost 0.005% ophthalmic solution: 1 drop(s) to each affected eye once a day (in the evening)  losartan 25 mg oral tablet: 2 tab(s) orally 2 times a day  methylphenidate 10 mg oral tablet: 2 pills at 8AM; 1 pill at 1PM  mirtazapine 7.5 mg oral tablet: 1 tab(s) orally once a day (at bedtime)  piperacillin-tazobactam: 3.375 gram(s) in dextrose 5% 100mL intravenous every 8 hours  rosuvastatin 10 mg oral tablet: 1 tab(s) orally once a day   Cardizem  mg/24 hours oral capsule, extended release: 1 cap(s) orally once a day   Dextrose 5% with 0.45% NaCl and KCl 20 mEq/L intravenous solution: 1000 milliliter(s) intravenous every 24 hours  gabapentin 400 mg oral capsule: 1 cap(s) orally 3 times a day  hydrALAZINE 25 mg oral tablet: 2 tab(s) orally every 8 hours  latanoprost 0.005% ophthalmic solution: 1 drop(s) to each affected eye once a day (in the evening)  methylphenidate 10 mg oral tablet: 2 pills at 8AM; 1 pill at 1PM  mirtazapine 7.5 mg oral tablet: 1 tab(s) orally once a day (at bedtime)  piperacillin-tazobactam: 3.375 gram(s) in dextrose 5% 100mL intravenous every 8 hours  rosuvastatin 10 mg oral tablet: 1 tab(s) orally once a day   Cardizem  mg/24 hours oral capsule, extended release: 1 cap(s) orally once a day   Dextrose 5% with 0.45% NaCl intravenous solution: 1000 milliliter(s) intravenous every 24 hours  gabapentin 400 mg oral capsule: 1 cap(s) orally 3 times a day  hydrALAZINE 25 mg oral tablet: 2 tab(s) orally every 8 hours  latanoprost 0.005% ophthalmic solution: 1 drop(s) to each affected eye once a day (in the evening)  methylphenidate 10 mg oral tablet: 2 pills at 8AM; 1 pill at 1PM  mirtazapine 7.5 mg oral tablet: 1 tab(s) orally once a day (at bedtime)  piperacillin-tazobactam: 3.375 gram(s) in dextrose 5% 100mL intravenous every 8 hours  rosuvastatin 10 mg oral tablet: 1 tab(s) orally once a day   Cardizem  mg/24 hours oral capsule, extended release: 1 cap(s) orally once a day   cefuroxime 500 mg oral tablet: 1 tab(s) orally every 12 hours  Dextrose 5% with 0.45% NaCl intravenous solution: 1000 milliliter(s) intravenous every 24 hours  gabapentin 400 mg oral capsule: 1 cap(s) orally 3 times a day  hydrALAZINE 25 mg oral tablet: 2 tab(s) orally every 8 hours  latanoprost 0.005% ophthalmic solution: 1 drop(s) to each affected eye once a day (in the evening)  methylphenidate 10 mg oral tablet: 2 pills at 8AM; 1 pill at 1PM  mirtazapine 7.5 mg oral tablet: 1 tab(s) orally once a day (at bedtime)  piperacillin-tazobactam: 3.375 gram(s) in dextrose 5% 100mL intravenous every 8 hours  rosuvastatin 10 mg oral tablet: 1 tab(s) orally once a day   Cardizem  mg/24 hours oral capsule, extended release: 1 cap(s) orally once a day   Dextrose 5% with 0.45% NaCl intravenous solution: 1000 milliliter(s) intravenous every 24 hours  gabapentin 400 mg oral capsule: 1 cap(s) orally 3 times a day  latanoprost 0.005% ophthalmic solution: 1 drop(s) to each affected eye once a day (in the evening)  methylphenidate 10 mg oral tablet: 2 pills at 8AM; 1 pill at 1PM  mirtazapine 7.5 mg oral tablet: 1 tab(s) orally once a day (at bedtime)  rosuvastatin 10 mg oral tablet: 1 tab(s) orally once a day

## 2022-11-26 NOTE — PROGRESS NOTE ADULT - SUBJECTIVE AND OBJECTIVE BOX
Patient is a 72y old  Male who presents with a chief complaint of acute on chronic AMS (26 Nov 2022 09:51)    HPI:  72M BPH CAD, HTN, HLD, Afib, SDH s/p mechanical fall, recurrent ICH when on AC brought today for weakness. History obtained from pt's wife Love and chart review given patient medical condition. Pt admitted from 10/19 to 10/22/2022 at [facility] for SHD s/p adexxa. Pt's wife endorses that patient has been progressively deteriorating, states that now only is reactive to painful stimulus, prior used to articulate some words. Patient completed 2 weeks of outpatient PT, has 8-10 hours nurse aid daily. Temp at home 99.9. Wife assist with feeding, was seen by neuro who start mirtazapine and sent for neurologic work up.     ED course  VS: Afebrile, HR: 96, BP: 154/97, SpO2: 97% RA RR 19  Labs significant for glucose 227, Alkaline phosphate 227, UA shows glucose 250  CT head showed chronic subdural hematomas overlying the left side of the posterior falx measuring up to 1.7 cm and 1.5 cm overlying the left temporal convexity.  Mild mass effect with sulcal effacement and partial compression of the  left lateral ventricle. No new acute intracranial hemorrhage.  EKG shows Afib with RVR @ 110 bpm, Right axis deviation    (23 Nov 2022 01:11)    INTERVAL HPI:  11/24/22: pt seen, examined , Pt had fever 103.8 & 101 early this AM , Afebrile Now, ON IV fluids, IV Bolus given, Leukocytosis ,Started on IV Abx Zosyn, wife at bedside, ID  Dr Boggs called , Hypokalemia - IV K replaced. Remeron d/c.    11/25/22: Patient seen and examined at bedside. Pt following some simple commands (move hands, squeeze finger), otherwise unresponsive to speech and unable to cooperate in AM. Reevaluated x2 in AM by speech, started on moderately thick liquids. WBC improving, replace K .  11/26/22: Patient seen and examined at bedside. Pt asleep during exam. Did not open eyes but nodded "yes" when I said his name. Potassium repleted. Wbc continues to improve. Hb dropped from 11.4 to 105.     OVERNIGHT EVENTS:    Home Medications:  gabapentin 400 mg oral capsule: 1 cap(s) orally 3 times a day (23 Nov 2022 02:49)  hydrALAZINE 25 mg oral tablet: 2 tab(s) orally every 8 hours (23 Nov 2022 02:49)  latanoprost 0.005% ophthalmic solution: 1 drop(s) to each affected eye once a day (in the evening) (23 Nov 2022 02:49)  losartan 25 mg oral tablet: 2 tab(s) orally 2 times a day (23 Nov 2022 02:49)  methylphenidate 10 mg oral tablet: 2 pills at 8AM; 1 pill at 1PM (23 Nov 2022 02:49)  mirtazapine 7.5 mg oral tablet: 1 tab(s) orally once a day (at bedtime) (23 Nov 2022 02:49)  rosuvastatin 10 mg oral tablet: 1 tab(s) orally once a day (23 Nov 2022 02:49)      MEDICATIONS  (STANDING):  dextrose 5% + sodium chloride 0.45% with potassium chloride 20 mEq/L 1000 milliLiter(s) (60 mL/Hr) IV Continuous <Continuous>  dextrose 5%. 1000 milliLiter(s) (100 mL/Hr) IV Continuous <Continuous>  dextrose 5%. 1000 milliLiter(s) (50 mL/Hr) IV Continuous <Continuous>  dextrose 50% Injectable 25 Gram(s) IV Push once  dextrose 50% Injectable 12.5 Gram(s) IV Push once  dextrose 50% Injectable 25 Gram(s) IV Push once  diltiazem    milliGRAM(s) Oral daily  gabapentin 400 milliGRAM(s) Oral three times a day  glucagon  Injectable 1 milliGRAM(s) IntraMuscular once  insulin lispro (ADMELOG) corrective regimen sliding scale   SubCutaneous every 6 hours  latanoprost 0.005% Ophthalmic Solution 1 Drop(s) Both EYES at bedtime  methylphenidate 20 milliGRAM(s) Oral daily  methylphenidate 10 milliGRAM(s) Oral daily  piperacillin/tazobactam IVPB.. 3.375 Gram(s) IV Intermittent every 8 hours  potassium chloride  10 mEq/100 mL IVPB 10 milliEquivalent(s) IV Intermittent every 1 hour    MEDICATIONS  (PRN):  acetaminophen  Suppository .. 650 milliGRAM(s) Rectal every 6 hours PRN Temp greater or equal to 38C (100.4F)  aluminum hydroxide/magnesium hydroxide/simethicone Suspension 30 milliLiter(s) Oral every 4 hours PRN Dyspepsia  dextrose Oral Gel 15 Gram(s) Oral once PRN Blood Glucose LESS THAN 70 milliGRAM(s)/deciliter  ondansetron Injectable 4 milliGRAM(s) IV Push every 8 hours PRN Nausea and/or Vomiting      Allergies    No Known Allergies    Intolerances        Social History:  Tobacco: denies   EtOH: denies   Recreational drug use: denies   Lives with: wife   Ambulates: bedbound, has a nurse aid 8-10 daily   Vaccinations: Fully COVID-19 vaccinated (23 Nov 2022 01:11)      REVIEW OF SYSTEMS:  unable to obtain     Vital Signs Last 24 Hrs  T(C): 36.8 (26 Nov 2022 04:32), Max: 37.1 (25 Nov 2022 12:50)  T(F): 98.2 (26 Nov 2022 04:32), Max: 98.8 (25 Nov 2022 12:50)  HR: 86 (26 Nov 2022 10:17) (86 - 109)  BP: 125/67 (26 Nov 2022 10:17) (113/73 - 125/67)  BP(mean): --  RR: 19 (26 Nov 2022 04:32) (18 - 19)  SpO2: 97% (26 Nov 2022 04:32) (97% - 97%)    Parameters below as of 26 Nov 2022 04:32  Patient On (Oxygen Delivery Method): nasal cannula  O2 Flow (L/min): 2    Finger Stick        11-25 @ 07:01  -  11-26 @ 07:00  --------------------------------------------------------  IN: 300 mL / OUT: 0 mL / NET: 300 mL        PHYSICAL EXAM:  GENERAL:  [ x ] NAD , [ x ] well appearing, [  ] Agitated, [x  ] Drowsy,  [x  ] Lethargy, [  ] confused   HEAD:  [ x ] Normal, [  ] Other  EYES:  [x  ] EOMI, [ x ] PERRLA, [x  ] conjunctiva and sclera clear normal, [  ] Other,  [ x ] Pallor,[  ] Discharge  ENMT:  [ x ] Normal, [x  ] Dry  mucous membranes, [ x ] Good dentition, [ x ] No Thrush  NECK:  [ x ] Supple, [ x ] No JVD, [ x ] Normal thyroid, [  ] Lymphadenopathy [  ] Other  CHEST/LUNG:  [ x ] Clear to auscultation bilaterally, [ x ] Breath Sounds equal B/L / Decrease, [x  ] poor effort  [ x ] No rales, [ x ] No rhonchi  [x  ]  No wheezing,   HEART:  [ x ] Regular rate and rhythm, [  ] tachycardia, [  ] Bradycardia,  [  ] irregular  [ x ] No murmurs, No rubs, No gallops, [  ] PPM in place (Mfr:  )  ABDOMEN: [ x ] Soft, [ x ] Nondistended, [ x ] No mass, [x  ] Bowel sounds present, [x  ] obese  NERVOUS SYSTEM:  [  ] Alert & Oriented x , [ x ] Nonfocal  [  ] Confusion  [  ] Encephalopathic [  ] Sedated [x  ] Unable to assess- asleep during exam, did not wake up [  ] Dementia   EXTREMITIES: [ x ] 2+ Peripheral Pulses, No clubbing, No cyanosis,  [  ] edema B/L lower EXT. [ x ] PVD stasis skin changes B/L Lower EXT, [  ] wound  LYMPH: No lymphadenopathy noted  SKIN:  [  ] No rashes or lesions, [  ] Pressure Ulcers, [  ] ecchymosis, [  ] Skin Tears, [x  ] Other- dry skin, with Scabs all EXT  DIET: Diet, Pureed:   Moderately Thick Liquids (MODTHICKLIQS) (11-25-22 @ 10:03)      LABS:                        10.5   13.83 )-----------( 149      ( 26 Nov 2022 07:52 )             32.3     26 Nov 2022 07:52    148    |  118    |  10     ----------------------------<  99     3.1     |  23     |  0.68     Ca    8.1        26 Nov 2022 07:52  Phos  1.9       26 Nov 2022 07:52  Mg     1.8       26 Nov 2022 07:52            Culture Results:   >100,000 CFU/ml Gram Negative Rods (11-24 @ 08:26)  Culture Results:   No growth to date. (11-24 @ 06:40)  Culture Results:   No growth to date. (11-24 @ 06:37)  Culture Results:   No growth to date. (11-22 @ 22:10)  Culture Results:   <10,000 CFU/mL Normal Urogenital Angle (11-22 @ 22:10)  Culture Results:   No growth to date. (11-22 @ 22:05)                  Culture - Urine (collected 24 Nov 2022 08:26)  Source: Catheterized Catheterized  Preliminary Report (26 Nov 2022 10:34):    >100,000 CFU/ml Gram Negative Rods    Culture - Blood (collected 24 Nov 2022 06:40)  Source: .Blood Blood-Peripheral  Preliminary Report (25 Nov 2022 14:01):    No growth to date.    Culture - Blood (collected 24 Nov 2022 06:37)  Source: .Blood Blood-Peripheral  Preliminary Report (25 Nov 2022 14:01):    No growth to date.    Culture - Urine (collected 22 Nov 2022 22:10)  Source: Catheterized Catheterized  Final Report (24 Nov 2022 07:27):    <10,000 CFU/mL Normal Urogenital Angle    Culture - Blood (collected 22 Nov 2022 22:10)  Source: .Blood Blood-Venous  Preliminary Report (24 Nov 2022 02:03):    No growth to date.    Culture - Blood (collected 22 Nov 2022 22:05)  Source: .Blood Blood-Venous  Preliminary Report (24 Nov 2022 02:03):    No growth to date.         Anemia Panel:  Vitamin B12, Serum: 656 pg/mL (11-24-22 @ 06:37)      Thyroid Panel:                RADIOLOGY & ADDITIONAL TESTS:      HEALTH ISSUES - PROBLEM Dx:  H/O spontaneous intraparenchymal intracranial hemorrhage    Chronic atrial fibrillation    HTN (hypertension)    Peripheral neuropathy    History of diabetes mellitus    Major depression    Need for prophylactic measure    Motor system disease    AMS (altered mental status)    Fever            Consultant(s) Notes Reviewed:  [  ] YES     Care Discussed with [X] Consultants  [  ] Patient  [  ] Family [  ] HCP [  ]   [  ] Social Service  [  ] RN, [  ] Physical Therapy,[  ] Palliative care team  DVT PPX: [  ] Lovenox, [  ] S C Heparin, [  ] Coumadin, [  ] Xarelto, [  ] Eliquis, [  ] Pradaxa, [  ] IV Heparin drip, [  ] SCD [  ] Contraindication 2 to GI Bleed,[  ] Ambulation [  ] Contraindicated 2 to  bleed [  ] Contraindicated 2 to Brain Bleed  Advanced directive: [  ] None, [  ] DNR/DNI Patient is a 72y old  Male who presents with a chief complaint of acute on chronic AMS (26 Nov 2022 09:51)    HPI:  72M BPH CAD, HTN, HLD, Afib, SDH s/p mechanical fall, recurrent ICH when on AC brought today for weakness. History obtained from pt's wife Love and chart review given patient medical condition. Pt admitted from 10/19 to 10/22/2022 at [facility] for SHD s/p adexxa. Pt's wife endorses that patient has been progressively deteriorating, states that now only is reactive to painful stimulus, prior used to articulate some words. Patient completed 2 weeks of outpatient PT, has 8-10 hours nurse aid daily. Temp at home 99.9. Wife assist with feeding, was seen by neuro who start mirtazapine and sent for neurologic work up.     ED course  VS: Afebrile, HR: 96, BP: 154/97, SpO2: 97% RA RR 19  Labs significant for glucose 227, Alkaline phosphate 227, UA shows glucose 250  CT head showed chronic subdural hematomas overlying the left side of the posterior falx measuring up to 1.7 cm and 1.5 cm overlying the left temporal convexity.  Mild mass effect with sulcal effacement and partial compression of the  left lateral ventricle. No new acute intracranial hemorrhage.  EKG shows Afib with RVR @ 110 bpm, Right axis deviation    (23 Nov 2022 01:11)    INTERVAL HPI:  11/24/22: pt seen, examined , Pt had fever 103.8 & 101 early this AM , Afebrile Now, ON IV fluids, IV Bolus given, Leukocytosis ,Started on IV Abx Zosyn, wife at bedside, ID  Dr Boggs called , Hypokalemia - IV K replaced. Remeron d/c.    11/25/22: Patient seen and examined at bedside. Pt following some simple commands (move hands, squeeze finger), otherwise unresponsive to speech and unable to cooperate in AM. Reevaluated x2 in AM by speech, started on moderately thick liquids. WBC improving, replace K     11/26/22: Patient seen and examined at bedside. Pt asleep during exam. Did not open eyes but nodded "yes" when I said his name. Potassium repleted. Wbc continues to improve. Hb dropped from 11.4 to 105. Plan to transfer to [facility] for further management.     OVERNIGHT EVENTS:    Home Medications:  gabapentin 400 mg oral capsule: 1 cap(s) orally 3 times a day (23 Nov 2022 02:49)  hydrALAZINE 25 mg oral tablet: 2 tab(s) orally every 8 hours (23 Nov 2022 02:49)  latanoprost 0.005% ophthalmic solution: 1 drop(s) to each affected eye once a day (in the evening) (23 Nov 2022 02:49)  losartan 25 mg oral tablet: 2 tab(s) orally 2 times a day (23 Nov 2022 02:49)  methylphenidate 10 mg oral tablet: 2 pills at 8AM; 1 pill at 1PM (23 Nov 2022 02:49)  mirtazapine 7.5 mg oral tablet: 1 tab(s) orally once a day (at bedtime) (23 Nov 2022 02:49)  rosuvastatin 10 mg oral tablet: 1 tab(s) orally once a day (23 Nov 2022 02:49)      MEDICATIONS  (STANDING):  dextrose 5% + sodium chloride 0.45% with potassium chloride 20 mEq/L 1000 milliLiter(s) (60 mL/Hr) IV Continuous <Continuous>  dextrose 5%. 1000 milliLiter(s) (100 mL/Hr) IV Continuous <Continuous>  dextrose 5%. 1000 milliLiter(s) (50 mL/Hr) IV Continuous <Continuous>  dextrose 50% Injectable 25 Gram(s) IV Push once  dextrose 50% Injectable 12.5 Gram(s) IV Push once  dextrose 50% Injectable 25 Gram(s) IV Push once  diltiazem    milliGRAM(s) Oral daily  gabapentin 400 milliGRAM(s) Oral three times a day  glucagon  Injectable 1 milliGRAM(s) IntraMuscular once  insulin lispro (ADMELOG) corrective regimen sliding scale   SubCutaneous every 6 hours  latanoprost 0.005% Ophthalmic Solution 1 Drop(s) Both EYES at bedtime  methylphenidate 20 milliGRAM(s) Oral daily  methylphenidate 10 milliGRAM(s) Oral daily  piperacillin/tazobactam IVPB.. 3.375 Gram(s) IV Intermittent every 8 hours  potassium chloride  10 mEq/100 mL IVPB 10 milliEquivalent(s) IV Intermittent every 1 hour    MEDICATIONS  (PRN):  acetaminophen  Suppository .. 650 milliGRAM(s) Rectal every 6 hours PRN Temp greater or equal to 38C (100.4F)  aluminum hydroxide/magnesium hydroxide/simethicone Suspension 30 milliLiter(s) Oral every 4 hours PRN Dyspepsia  dextrose Oral Gel 15 Gram(s) Oral once PRN Blood Glucose LESS THAN 70 milliGRAM(s)/deciliter  ondansetron Injectable 4 milliGRAM(s) IV Push every 8 hours PRN Nausea and/or Vomiting      Allergies    No Known Allergies    Intolerances        Social History:  Tobacco: denies   EtOH: denies   Recreational drug use: denies   Lives with: wife   Ambulates: bedbound, has a nurse aid 8-10 daily   Vaccinations: Fully COVID-19 vaccinated (23 Nov 2022 01:11)      REVIEW OF SYSTEMS:  unable to obtain     Vital Signs Last 24 Hrs  T(C): 36.8 (26 Nov 2022 04:32), Max: 37.1 (25 Nov 2022 12:50)  T(F): 98.2 (26 Nov 2022 04:32), Max: 98.8 (25 Nov 2022 12:50)  HR: 86 (26 Nov 2022 10:17) (86 - 109)  BP: 125/67 (26 Nov 2022 10:17) (113/73 - 125/67)  BP(mean): --  RR: 19 (26 Nov 2022 04:32) (18 - 19)  SpO2: 97% (26 Nov 2022 04:32) (97% - 97%)    Parameters below as of 26 Nov 2022 04:32  Patient On (Oxygen Delivery Method): nasal cannula  O2 Flow (L/min): 2    Finger Stick        11-25 @ 07:01  -  11-26 @ 07:00  --------------------------------------------------------  IN: 300 mL / OUT: 0 mL / NET: 300 mL        PHYSICAL EXAM:  GENERAL:  [ x ] NAD , [ x ] well appearing, [  ] Agitated, [x  ] Drowsy,  [x  ] Lethargy, [  ] confused   HEAD:  [ x ] Normal, [  ] Other  EYES:  [x  ] EOMI, [ x ] PERRLA, [x  ] conjunctiva and sclera clear normal, [  ] Other,  [ x ] Pallor,[  ] Discharge  ENMT:  [ x ] Normal, [x  ] Dry  mucous membranes, [ x ] Good dentition, [ x ] No Thrush  NECK:  [ x ] Supple, [ x ] No JVD, [ x ] Normal thyroid, [  ] Lymphadenopathy [  ] Other  CHEST/LUNG:  [ x ] Clear to auscultation bilaterally, [ x ] Breath Sounds equal B/L / Decrease, [x  ] poor effort  [ x ] No rales, [ x ] No rhonchi  [x  ]  No wheezing,   HEART:  [ x ] Regular rate and rhythm, [  ] tachycardia, [  ] Bradycardia,  [  ] irregular  [ x ] No murmurs, No rubs, No gallops, [  ] PPM in place (Mfr:  )  ABDOMEN: [ x ] Soft, [ x ] Nondistended, [ x ] No mass, [x  ] Bowel sounds present, [x  ] obese  NERVOUS SYSTEM:  [  ] Alert & Oriented x , [ x ] Nonfocal  [  ] Confusion  [  ] Encephalopathic [  ] Sedated [x  ] Unable to assess- asleep during exam, did not wake up [  ] Dementia   EXTREMITIES: [ x ] 2+ Peripheral Pulses, No clubbing, No cyanosis,  [  ] edema B/L lower EXT. [ x ] PVD stasis skin changes B/L Lower EXT, [  ] wound  LYMPH: No lymphadenopathy noted  SKIN:  [  ] No rashes or lesions, [  ] Pressure Ulcers, [  ] ecchymosis, [  ] Skin Tears, [x  ] Other- dry skin, with Scabs all EXT  DIET: Diet, Pureed:   Moderately Thick Liquids (MODTHICKLIQS) (11-25-22 @ 10:03)      LABS:                        10.5   13.83 )-----------( 149      ( 26 Nov 2022 07:52 )             32.3     26 Nov 2022 07:52    148    |  118    |  10     ----------------------------<  99     3.1     |  23     |  0.68     Ca    8.1        26 Nov 2022 07:52  Phos  1.9       26 Nov 2022 07:52  Mg     1.8       26 Nov 2022 07:52            Culture Results:   >100,000 CFU/ml Gram Negative Rods (11-24 @ 08:26)  Culture Results:   No growth to date. (11-24 @ 06:40)  Culture Results:   No growth to date. (11-24 @ 06:37)  Culture Results:   No growth to date. (11-22 @ 22:10)  Culture Results:   <10,000 CFU/mL Normal Urogenital Angle (11-22 @ 22:10)  Culture Results:   No growth to date. (11-22 @ 22:05)                  Culture - Urine (collected 24 Nov 2022 08:26)  Source: Catheterized Catheterized  Preliminary Report (26 Nov 2022 10:34):    >100,000 CFU/ml Gram Negative Rods    Culture - Blood (collected 24 Nov 2022 06:40)  Source: .Blood Blood-Peripheral  Preliminary Report (25 Nov 2022 14:01):    No growth to date.    Culture - Blood (collected 24 Nov 2022 06:37)  Source: .Blood Blood-Peripheral  Preliminary Report (25 Nov 2022 14:01):    No growth to date.    Culture - Urine (collected 22 Nov 2022 22:10)  Source: Catheterized Catheterized  Final Report (24 Nov 2022 07:27):    <10,000 CFU/mL Normal Urogenital Angle    Culture - Blood (collected 22 Nov 2022 22:10)  Source: .Blood Blood-Venous  Preliminary Report (24 Nov 2022 02:03):    No growth to date.    Culture - Blood (collected 22 Nov 2022 22:05)  Source: .Blood Blood-Venous  Preliminary Report (24 Nov 2022 02:03):    No growth to date.         Anemia Panel:  Vitamin B12, Serum: 656 pg/mL (11-24-22 @ 06:37)      Thyroid Panel:                RADIOLOGY & ADDITIONAL TESTS:      HEALTH ISSUES - PROBLEM Dx:  H/O spontaneous intraparenchymal intracranial hemorrhage    Chronic atrial fibrillation    HTN (hypertension)    Peripheral neuropathy    History of diabetes mellitus    Major depression    Need for prophylactic measure    Motor system disease    AMS (altered mental status)    Fever            Consultant(s) Notes Reviewed:  [  ] YES     Care Discussed with [X] Consultants  [  ] Patient  [  ] Family [  ] HCP [  ]   [  ] Social Service  [  ] RN, [  ] Physical Therapy,[  ] Palliative care team  DVT PPX: [  ] Lovenox, [  ] S C Heparin, [  ] Coumadin, [  ] Xarelto, [  ] Eliquis, [  ] Pradaxa, [  ] IV Heparin drip, [  ] SCD [  ] Contraindication 2 to GI Bleed,[  ] Ambulation [  ] Contraindicated 2 to  bleed [  ] Contraindicated 2 to Brain Bleed  Advanced directive: [  ] None, [  ] DNR/DNI Patient is a 72y old  Male who presents with a chief complaint of acute on chronic AMS (26 Nov 2022 09:51)    HPI:  72M BPH CAD, HTN, HLD, Afib, SDH s/p mechanical fall, recurrent ICH when on AC brought today for weakness. History obtained from pt's wife Love and chart review given patient medical condition. Pt admitted from 10/19 to 10/22/2022 at [facility] for SHD s/p adexxa. Pt's wife endorses that patient has been progressively deteriorating, states that now only is reactive to painful stimulus, prior used to articulate some words. Patient completed 2 weeks of outpatient PT, has 8-10 hours nurse aid daily. Temp at home 99.9. Wife assist with feeding, was seen by neuro who start mirtazapine and sent for neurologic work up.     ED course  VS: Afebrile, HR: 96, BP: 154/97, SpO2: 97% RA RR 19  Labs significant for glucose 227, Alkaline phosphate 227, UA shows glucose 250  CT head showed chronic subdural hematomas overlying the left side of the posterior falx measuring up to 1.7 cm and 1.5 cm overlying the left temporal convexity.  Mild mass effect with sulcal effacement and partial compression of the  left lateral ventricle. No new acute intracranial hemorrhage.  EKG shows Afib with RVR @ 110 bpm, Right axis deviation    (23 Nov 2022 01:11)    INTERVAL HPI:  11/24/22: pt seen, examined , Pt had fever 103.8 & 101 early this AM , Afebrile Now, ON IV fluids, IV Bolus given, Leukocytosis ,Started on IV Abx Zosyn, wife at bedside, ID  Dr Boggs called , Hypokalemia - IV K replaced. Remeron d/c.    11/25/22: Patient seen and examined at bedside. Pt following some simple commands (move hands, squeeze finger), otherwise unresponsive to speech and unable to cooperate in AM. Reevaluated x2 in AM by speech, started on moderately thick liquids. WBC improving, replace K     11/26/22: Patient seen and examined at bedside. Pt asleep during exam. Did not open eyes but nodded "yes" when I said his name. Potassium repleted. Wbc continues to improve. Hb dropped from 11.4 to 105. Plan to transfer to [facility] for further management. On IV Abx, WBC Improving     OVERNIGHT EVENTS: NONE    Home Medications:  gabapentin 400 mg oral capsule: 1 cap(s) orally 3 times a day (23 Nov 2022 02:49)  hydrALAZINE 25 mg oral tablet: 2 tab(s) orally every 8 hours (23 Nov 2022 02:49)  latanoprost 0.005% ophthalmic solution: 1 drop(s) to each affected eye once a day (in the evening) (23 Nov 2022 02:49)  losartan 25 mg oral tablet: 2 tab(s) orally 2 times a day (23 Nov 2022 02:49)  methylphenidate 10 mg oral tablet: 2 pills at 8AM; 1 pill at 1PM (23 Nov 2022 02:49)  mirtazapine 7.5 mg oral tablet: 1 tab(s) orally once a day (at bedtime) (23 Nov 2022 02:49)  rosuvastatin 10 mg oral tablet: 1 tab(s) orally once a day (23 Nov 2022 02:49)      MEDICATIONS  (STANDING):  dextrose 5% + sodium chloride 0.45% with potassium chloride 20 mEq/L 1000 milliLiter(s) (60 mL/Hr) IV Continuous <Continuous>  dextrose 5%. 1000 milliLiter(s) (100 mL/Hr) IV Continuous <Continuous>  dextrose 5%. 1000 milliLiter(s) (50 mL/Hr) IV Continuous <Continuous>  dextrose 50% Injectable 25 Gram(s) IV Push once  dextrose 50% Injectable 12.5 Gram(s) IV Push once  dextrose 50% Injectable 25 Gram(s) IV Push once  diltiazem    milliGRAM(s) Oral daily  gabapentin 400 milliGRAM(s) Oral three times a day  glucagon  Injectable 1 milliGRAM(s) IntraMuscular once  insulin lispro (ADMELOG) corrective regimen sliding scale   SubCutaneous every 6 hours  latanoprost 0.005% Ophthalmic Solution 1 Drop(s) Both EYES at bedtime  methylphenidate 20 milliGRAM(s) Oral daily  methylphenidate 10 milliGRAM(s) Oral daily  piperacillin/tazobactam IVPB.. 3.375 Gram(s) IV Intermittent every 8 hours  potassium chloride  10 mEq/100 mL IVPB 10 milliEquivalent(s) IV Intermittent every 1 hour    MEDICATIONS  (PRN):  acetaminophen  Suppository .. 650 milliGRAM(s) Rectal every 6 hours PRN Temp greater or equal to 38C (100.4F)  aluminum hydroxide/magnesium hydroxide/simethicone Suspension 30 milliLiter(s) Oral every 4 hours PRN Dyspepsia  dextrose Oral Gel 15 Gram(s) Oral once PRN Blood Glucose LESS THAN 70 milliGRAM(s)/deciliter  ondansetron Injectable 4 milliGRAM(s) IV Push every 8 hours PRN Nausea and/or Vomiting      Allergies    No Known Allergies    Intolerances        Social History:  Tobacco: denies   EtOH: denies   Recreational drug use: denies   Lives with: wife   Ambulates: bedbound, has a nurse aid 8-10 daily   Vaccinations: Fully COVID-19 vaccinated (23 Nov 2022 01:11)      REVIEW OF SYSTEMS:  unable to obtain     Vital Signs Last 24 Hrs  T(C): 36.8 (26 Nov 2022 04:32), Max: 37.1 (25 Nov 2022 12:50)  T(F): 98.2 (26 Nov 2022 04:32), Max: 98.8 (25 Nov 2022 12:50)  HR: 86 (26 Nov 2022 10:17) (86 - 109)  BP: 125/67 (26 Nov 2022 10:17) (113/73 - 125/67)  BP(mean): --  RR: 19 (26 Nov 2022 04:32) (18 - 19)  SpO2: 97% (26 Nov 2022 04:32) (97% - 97%)    Parameters below as of 26 Nov 2022 04:32  Patient On (Oxygen Delivery Method): nasal cannula  O2 Flow (L/min): 2    Finger Stick        11-25 @ 07:01  -  11-26 @ 07:00  --------------------------------------------------------  IN: 300 mL / OUT: 0 mL / NET: 300 mL        PHYSICAL EXAM: awake, smiles , ~non verbal  GENERAL:  [ x ] NAD , [ x ] well appearing, [  ] Agitated, [x  ] Drowsy,  [x  ] Lethargy, [  ] confused   HEAD:  [ x ] Normal, [  ] Other  EYES:  [x  ] EOMI, [ x ] PERRLA, [x  ] conjunctiva and sclera clear normal, [  ] Other,  [ x ] Pallor,[  ] Discharge  ENMT:  [ x ] Normal, [x  ] Dry  mucous membranes, [ x ] Good dentition, [ x ] No Thrush  NECK:  [ x ] Supple, [ x ] No JVD, [ x ] Normal thyroid, [  ] Lymphadenopathy [  ] Other  CHEST/LUNG:  [ x ] Clear to auscultation bilaterally, [ x ] Breath Sounds equal B/L / Decrease, [x  ] poor effort  [ x ] No rales, [ x ] No rhonchi  [x  ]  No wheezing,   HEART:  [ x ] Regular rate and rhythm, [  ] tachycardia, [  ] Bradycardia,  [  ] irregular  [ x ] No murmurs, No rubs, No gallops, [  ] PPM in place (Mfr:  )  ABDOMEN: [ x ] Soft, [ x ] Nondistended, [ x ] No mass, [x  ] Bowel sounds present, [x  ] obese  NERVOUS SYSTEM:  [  ] Alert & Oriented x , [ x ] Nonfocal  [  ] Confusion  [  ] Encephalopathic [  ] Sedated [x  ] Unable to assess- asleep during exam, did not wake up [  ] Dementia   EXTREMITIES: [ x ] 2+ Peripheral Pulses, No clubbing, No cyanosis,  [  ] edema B/L lower EXT. [ x ] PVD stasis skin changes B/L Lower EXT, [  ] wound + Lower Ext pain -  LYMPH: No lymphadenopathy noted  SKIN:  [  ] No rashes or lesions, [  ] Pressure Ulcers, [  ] ecchymosis, [  ] Skin Tears, [x  ] Other- dry skin, with Scabs all EXT  DIET: Diet, Pureed:   Moderately Thick Liquids (MODTHICKLIQS) (11-25-22 @ 10:03)      LABS:                        10.5   13.83 )-----------( 149      ( 26 Nov 2022 07:52 )             32.3     26 Nov 2022 07:52    148    |  118    |  10     ----------------------------<  99     3.1     |  23     |  0.68     Ca    8.1        26 Nov 2022 07:52  Phos  1.9       26 Nov 2022 07:52  Mg     1.8       26 Nov 2022 07:52            Culture Results:   >100,000 CFU/ml Gram Negative Rods (11-24 @ 08:26)  Culture Results:   No growth to date. (11-24 @ 06:40)  Culture Results:   No growth to date. (11-24 @ 06:37)  Culture Results:   No growth to date. (11-22 @ 22:10)  Culture Results:   <10,000 CFU/mL Normal Urogenital Angle (11-22 @ 22:10)  Culture Results:   No growth to date. (11-22 @ 22:05)                  Culture - Urine (collected 24 Nov 2022 08:26)  Source: Catheterized Catheterized  Preliminary Report (26 Nov 2022 10:34):    >100,000 CFU/ml Gram Negative Rods    Culture - Blood (collected 24 Nov 2022 06:40)  Source: .Blood Blood-Peripheral  Preliminary Report (25 Nov 2022 14:01):    No growth to date.    Culture - Blood (collected 24 Nov 2022 06:37)  Source: .Blood Blood-Peripheral  Preliminary Report (25 Nov 2022 14:01):    No growth to date.    Culture - Urine (collected 22 Nov 2022 22:10)  Source: Catheterized Catheterized  Final Report (24 Nov 2022 07:27):    <10,000 CFU/mL Normal Urogenital Angle    Culture - Blood (collected 22 Nov 2022 22:10)  Source: .Blood Blood-Venous  Preliminary Report (24 Nov 2022 02:03):    No growth to date.    Culture - Blood (collected 22 Nov 2022 22:05)  Source: .Blood Blood-Venous  Preliminary Report (24 Nov 2022 02:03):    No growth to date.         Anemia Panel:  Vitamin B12, Serum: 656 pg/mL (11-24-22 @ 06:37)      Thyroid Panel:    RADIOLOGY & ADDITIONAL TESTS:  < from: MR Head No Cont (11.25.22 @ 16:15) >    BRAIN and CSF SPACES: The patient is again noted for multifocal subdural   hemorrhage distorting the left cerebral hemisphere. The larger component   extends along the lateral convexity from near the temporal tip to the   temporal occipitaljunction and measures up to 1.4 cm thickness when   measured perpendicular to the inner table of the calvarium. This   collection is heterogeneous in signal intensity within septations and   dependent layering. It displaces and compresses gyri of theleft temporal   lobe and largely effaces the temporal horn of the left lateral ventricle.   A noncontiguous collection overlies the left cerebral hemisphere medial   aspect along the left posterior falx. This collection measures up to 1.6   cm thickness. It displaces gyri of the left parietal lobe. This   collection has a thick rind that is low signal intensity on all pulse   sequences. This contributes to 2 deformity, displacement and partial   effacement of the atrium of the left lateral ventricle. This parafalcine   component is associated with downward extension to the left tentorium.   These hemorrhages are associated with only slight left-to-right   subfalcine herniation, less than 0.3 cm at the anterior septum.    Linear hyperintensities found within multiple cysts sulci of the left   cerebral hemisphere. This does not appear to be artifactual and is   consistent with subarachnoid space hemorrhage.    Focal encephalomalacia is again noted in the supraorbital right frontal   lobe and at the right temporal tip. Extensive gliotic signal   hyperintensity is noted on the long TR images throughout much of the   cerebral hemispheric white matter. There is asymmetric ex vacuo   dilatation of the right lateral ventricle particularly itstemporal horn.   Deep cerebral hemispheric white matter hyperintensity is also present in   the left cerebral hemisphere, consistent with considerable ischemic white   matter disease within each cerebral hemisphere. Similar ventral pontine   signal intensity also likely reflects long-standing ischemic white matter   disease.    VESSELS:   The vertebral and internal carotid arteries demonstrate   expected flow voids indicating their patency.  The right vertebral artery   is dominant caliber. The posterior circulation is tortuous.    HEAD AND NECK STRUCTURES:   The orbits are unremarkable.  Paranasal   sinuses are clear.  The nasal cavity demonstrates irregular deviation   nasal septum left-to-right.  The central skull base appears intact.  The  nasopharynx contains a small volume dependent fluid.  The temporal bones   appear clear of disease.  The calvarium demonstrates a right frontal   craniotomy defect.      IMPRESSION:    1. Left lateral convexity subacute to long-standing subdural hematoma,   unchanged from 11/22/2022, increased from 10/20/2022    2. Left posterior parafalcine subacute to long-standing subdural   hematoma, unchanged since 10/20/2022    3. Left posterior convexity subarachnoid space hemorrhage (favored over   artifact)    4. Right frontal and right temporal tip focal encephalomalacia consistent   with posttraumatic etiology    5. Severe cerebral hemispheric white matter abnormality likely reflects   both ischemia and posttraumatic injury    6. Diffuse brain volume loss greater than typical for age consistent with   Central cerebral hemispheric white matter volume loss    --- End of Report ---          < end of copied text >      HEALTH ISSUES - PROBLEM Dx:  H/O spontaneous intraparenchymal intracranial hemorrhage    Chronic atrial fibrillation    HTN (hypertension)    Peripheral neuropathy    History of diabetes mellitus    Major depression    Need for prophylactic measure    Motor system disease    AMS (altered mental status)    Fever            Consultant(s) Notes Reviewed:  [x  ] YES     Care Discussed with [X] Consultants  [ x ] Patient  [x  ] Family [  ] HCP [x  ]   [  ] Social Service  [x  ] RN, [  ] Physical Therapy,[  ] Palliative care team  DVT PPX: [  ] Lovenox, [  ] S C Heparin, [  ] Coumadin, [  ] Xarelto, [  ] Eliquis, [  ] Pradaxa, [  ] IV Heparin drip, [  ] SCD [  ] Contraindication 2 to GI Bleed,[  ] Ambulation [  ] Contraindicated 2 to  bleed [x] Contraindicated 2 to Brain Bleed  Advanced directive: [  ] None, [ x ] DNR/DNI

## 2022-11-26 NOTE — DISCHARGE NOTE PROVIDER - PROVIDER TOKENS
PROVIDER:[TOKEN:[[id number]],FOLLOWUP:[2 weeks]],PROVIDER:[TOKEN:[[id number]],FOLLOWUP:[2 weeks]] PROVIDER:[TOKEN:[[id number]],FOLLOWUP:[2 weeks]],PROVIDER:[TOKEN:[[id number]],FOLLOWUP:[1 week]],PROVIDER:[TOKEN:[[id number]],FOLLOWUP:[1 week],ESTABLISHEDPATIENT:[T]]

## 2022-11-26 NOTE — DISCHARGE NOTE PROVIDER - CARE PROVIDER_API CALL
Richard Mathis)  Gastroenterology; Internal Medicine  [address]  [place] [address]  Phone: [phone]  Fax: [phone]  Follow Up Time: 2 weeks    Mirta Goldsmith)  Internal Medicine  [address] [place] [address]  Phone: [phone]  Fax: [phone]  Follow Up Time: 2 weeks   Richard Mathis)  Gastroenterology; Internal Medicine  [address] [facility]  [place] [address]  Phone: [phone]  Fax: [phone]  Follow Up Time: 2 weeks    Mirta Goldsmith)  Internal Medicine  [address] [place] [address]  Phone: [phone]  Fax: [phone]  Follow Up Time: 1 week    Kalina Main  NEUROLOGY  [address] [place] [address]  Phone: [phone]  Fax: [phone]  Established Patient  Follow Up Time: 1 week

## 2022-11-26 NOTE — DISCHARGE NOTE PROVIDER - NSDCCPCAREPLAN_GEN_ALL_CORE_FT
PRINCIPAL DISCHARGE DIAGNOSIS  Diagnosis: Generalized weakness  Assessment and Plan of Treatment: You were admitted for weakness. CT head showed chronic subdural hematomas overlying the left side of the posterior falx measuring up to 1.7 cm and 1.5 cm overlying the left temporal convexity.  Mild mass effect with sulcal effacement and partial compression of the  left lateral ventricle. No new acute intracranial hemorrhage. Neurology was consulted and brain MRI was ordered which showed -1. Left lateral convexity subacute to long-standing subdural hematoma, unchanged from 11/22/2022, increased from 10/20/2022 2. Left posterior parafalcine subacute to long-standing subdural hematoma, unchanged since 10/20/2022 3. Left posterior convexity subarachnoid space hemorrhage (favored over artifact) 4. Right frontal and right temporal tip focal encephalomalacia consistent with posttraumatic etiology 5. Severe cerebral hemispheric white matter abnormality likely reflects both ischemia and posttraumatic injury 6. Diffuse brain volume loss greater than typical for age consistent with Central cerebral hemispheric white matter volume loss.   You were seen by Speech and swallow and was recommended puree diet and moderately thick liquids.         SECONDARY DISCHARGE DIAGNOSES  Diagnosis: Sepsis  Assessment and Plan of Treatment: You were found to have a fever and infection and were started on IV Zosyn and seen by infectious disease.   --Continue IV Zosyn and IVF    Diagnosis: HTN (hypertension)  Assessment and Plan of Treatment: You have a known history of hypertension, the medical term for high blood pressure. Untreated high blood pressure increases the strain on the heart and arteries, eventually causing organ damage. High blood pressure also increases the risk of heart failure, heart attack (myocardial infarction), stroke, and kidney failure.   - Continue to take your home medications to prevent the possible complications of uncontrolled hypertension.   - Please also follow up with your primary care physician on a regular basis to make sure your blood pressure continues to be well controlled.       PRINCIPAL DISCHARGE DIAGNOSIS  Diagnosis: AMS (altered mental status)  Assessment and Plan of Treatment: You were admitted for AMS &  weakness.  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  Mild mass effect with sulcal effacement and partial compression of the  left lateral ventricle. No new acute intracranial hemorrhage.  - Neurology was consulted and brain MRI was ordered which showed -1. Left lateral convexity subacute to long-standing subdural hematoma, unchanged from 11/22/2022, increased from 10/20/2022 2. Left posterior parafalcine subacute to long-standing subdural hematoma, unchanged since 10/20/2022 3. Left posterior convexity subarachnoid space hemorrhage (favored over artifact) 4. Right frontal and right temporal tip focal encephalomalacia consistent with posttraumatic etiology 5. Severe cerebral hemispheric white matter abnormality likely reflects both ischemia and posttraumatic injury 6. Diffuse brain volume loss greater than typical for age consistent with Central cerebral hemispheric white matter volume loss.   Neurosurgical Eval, EEG to r/o Sz        SECONDARY DISCHARGE DIAGNOSES  Diagnosis: Sepsis  Assessment and Plan of Treatment: You were found to have a fever and Leukocytosis  2/2 Likely Aspiration PNA  RT LL  and were started on IV Zosyn q 8 hrs  and seen by infectious disease.   --Continue IV Zosyn and IVF  -ID DR Boggs /Dr Manning    Diagnosis: Motor system disease  Assessment and Plan of Treatment: Chronic Bed bound   -On Ritalin 2x day    Diagnosis: History of diabetes mellitus  Assessment and Plan of Treatment: FS q AC HS with HISS    Diagnosis: HTN (hypertension)  Assessment and Plan of Treatment: You have a known history of hypertension,   Continue Hydralazine 3x day  On Cardizem Daily   ACEi - on HOLD for now  -On Statin daily      Diagnosis: Peripheral neuropathy  Assessment and Plan of Treatment: on Neurontin 3x day    Diagnosis: Major depression  Assessment and Plan of Treatment: on Remeron 7.5 mg Q HS    Diagnosis: Oropharyngeal dysphagia  Assessment and Plan of Treatment: You were seen by Speech and swallow and was recommended puree diet and moderately thick liquids.  S & S Follow up     PRINCIPAL DISCHARGE DIAGNOSIS  Diagnosis: AMS (altered mental status)  Assessment and Plan of Treatment: You were admitted for AMS &  weakness.  -H/O Recent ICH & SDH 10/20/22   - CT head showed chronic subdural hematomas overlying the left side of the posterior falx measuring up to 1.7 cm and 1.5 cm overlying the left temporal convexity.  Mild mass effect with sulcal effacement and partial compression of the  left lateral ventricle. No new acute intracranial hemorrhage.  - Neurology was consulted and brain MRI was ordered which showed -1. Left lateral convexity subacute to long-standing subdural hematoma, unchanged from 11/22/2022, increased from 10/20/2022 2. Left posterior parafalcine subacute to long-standing subdural hematoma, unchanged since 10/20/2022 3. Left posterior convexity subarachnoid space hemorrhage (favored over artifact) 4. Right frontal and right temporal tip focal encephalomalacia consistent with posttraumatic etiology 5. Severe cerebral hemispheric white matter abnormality likely reflects both ischemia and posttraumatic injury 6. Diffuse brain volume loss greater than typical for age consistent with Central cerebral hemispheric white matter volume loss.   Neurosurgical Eval, EEG to r/o Sz        SECONDARY DISCHARGE DIAGNOSES  Diagnosis: Sepsis  Assessment and Plan of Treatment: You were found to have a fever and Leukocytosis  2/2 Likely Aspiration PNA  RT LL  and were started on IV Zosyn q 8 hrs  and seen by infectious disease.   --Continue IV Zosyn and IVF  -ID DR Boggs /Dr Manning    Diagnosis: Motor system disease  Assessment and Plan of Treatment: Chronic Bed bound   -On Ritalin 2x day    Diagnosis: History of diabetes mellitus  Assessment and Plan of Treatment: FS q AC HS with HISS    Diagnosis: HTN (hypertension)  Assessment and Plan of Treatment: You have a known history of hypertension,   Continue Hydralazine 3x day  On Cardizem Daily   ACEi - on HOLD for now  -On Statin daily      Diagnosis: Peripheral neuropathy  Assessment and Plan of Treatment: on Neurontin 3x day    Diagnosis: Major depression  Assessment and Plan of Treatment: on Remeron 7.5 mg Q HS    Diagnosis: Oropharyngeal dysphagia  Assessment and Plan of Treatment: You were seen by Speech and swallow and was recommended puree diet and moderately thick liquids.  S & S Follow up     PRINCIPAL DISCHARGE DIAGNOSIS  Diagnosis: AMS (altered mental status)  Assessment and Plan of Treatment: You were admitted for AMS &  weakness.  -H/O Recent ICH & SDH 10/20/22   - CT head showed chronic subdural hematomas overlying the left side of the posterior falx measuring up to 1.7 cm and 1.5 cm overlying the left temporal convexity.  Mild mass effect with sulcal effacement and partial compression of the  left lateral ventricle. No new acute intracranial hemorrhage.  - Neurology was consulted and brain MRI was ordered which showed -1. Left lateral convexity subacute to long-standing subdural hematoma, unchanged from 11/22/2022, increased from 10/20/2022 2. Left posterior parafalcine subacute to long-standing subdural hematoma, unchanged since 10/20/2022 3. Left posterior convexity subarachnoid space hemorrhage (favored over artifact) 4. Right frontal and right temporal tip focal encephalomalacia consistent with posttraumatic etiology 5. Severe cerebral hemispheric white matter abnormality likely reflects both ischemia and posttraumatic injury 6. Diffuse brain volume loss greater than typical for age consistent with Central cerebral hemispheric white matter volume loss.   Neurosurgical Eval, EEG to r/o Sz        SECONDARY DISCHARGE DIAGNOSES  Diagnosis: Sepsis  Assessment and Plan of Treatment: You were found to have a fever and Leukocytosis on IV zosyn transitioned to oral antibiotics. Please follow up with your primary care provider in 1 week.    Diagnosis: Oropharyngeal dysphagia  Assessment and Plan of Treatment: You were seen by Speech and swallow and was recommended puree diet and moderately thick liquids. A modified barium swallow was conducted, but incomplete due to poor patient cooperation.    Diagnosis: HTN (hypertension)  Assessment and Plan of Treatment: You have a known history of hypertension, however your blood pressure has been relatively low during your stay and you BP meds were held. Continue holding your meds until you see your primary care provider in 1 week.    Diagnosis: History of diabetes mellitus  Assessment and Plan of Treatment: FS q AC HS with HISS    Diagnosis: Motor system disease  Assessment and Plan of Treatment: Chronic Bed bound   -On Ritalin 2x day    Diagnosis: Peripheral neuropathy  Assessment and Plan of Treatment: on Neurontin 3x day    Diagnosis: Major depression  Assessment and Plan of Treatment: on Remeron 7.5 mg Q HS     PRINCIPAL DISCHARGE DIAGNOSIS  Diagnosis: AMS (altered mental status)  Assessment and Plan of Treatment: You were admitted for AMS & weakness and seen by neurology. The etiology was unclear, but may have been due to chronic subdural hematoma and metabolic encophalopathy. Please follow up with your primary care provider and neurology in 1 week.      SECONDARY DISCHARGE DIAGNOSES  Diagnosis: Sepsis  Assessment and Plan of Treatment: You were found to have a fever and leukocytosis on IV zosyn transitioned to oral antibiotics. Your blood cultures were negative, but urine cultures grew klebsiella bacteria. Please follow up with your primary care provider in 1 week.    Diagnosis: HTN (hypertension)  Assessment and Plan of Treatment: You have a known history of hypertension, however your blood pressure has been relatively low during your stay and some of your BP meds were held. Continue holding your meds as directed until you see your primary care provider in 1 week.    Diagnosis: Oropharyngeal dysphagia  Assessment and Plan of Treatment: You were seen by Speech and swallow and was recommended puree diet and moderately thick liquids. A modified barium swallow was conducted, but incomplete due to poor patient cooperation. Pt was started on soft and bite sized diet and moderately thick liquids after extensive discussion with speech and swallow, pt's wife, gastroenterology, and the primary team.    Diagnosis: History of diabetes mellitus  Assessment and Plan of Treatment: FS q AC HS with HISS    Diagnosis: Motor system disease  Assessment and Plan of Treatment: Chronic Bed bound   -On Ritalin 2x day    Diagnosis: Peripheral neuropathy  Assessment and Plan of Treatment: on Neurontin 3x day    Diagnosis: Major depression  Assessment and Plan of Treatment: on Remeron 7.5 mg Q HS     PRINCIPAL DISCHARGE DIAGNOSIS  Diagnosis: AMS (altered mental status)  Assessment and Plan of Treatment: You were admitted for AMS & weakness and seen by neurology. The etiology was unclear, but may have been due to chronic subdural hematoma and metabolic encophalopathy. Please follow up with your primary care provider and neurology in 1 week.      SECONDARY DISCHARGE DIAGNOSES  Diagnosis: Sepsis  Assessment and Plan of Treatment: You were found to have a fever and leukocytosis on IV zosyn completed transitioned to oral antibiotics.  complete PO Abx Your blood cultures were negative, but urine cultures grew klebsiella bacteria. Please follow up with your primary care provider in 1 week.    Diagnosis: Motor system disease  Assessment and Plan of Treatment: Chronic Bed bound   -On Ritalin 2x day    Diagnosis: History of diabetes mellitus  Assessment and Plan of Treatment: FS q AC HS with HISS    Diagnosis: HTN (hypertension)  Assessment and Plan of Treatment: You have a known history of hypertension, however your blood pressure has been relatively low during your stay and some of your BP meds were held. Continue holding your meds as directed until you see your primary care provider in 1 week.    Diagnosis: Peripheral neuropathy  Assessment and Plan of Treatment: on Neurontin 3x day    Diagnosis: Major depression  Assessment and Plan of Treatment: on Remeron 7.5 mg Q HS    Diagnosis: Oropharyngeal dysphagia  Assessment and Plan of Treatment: You were seen by Speech and swallow and was recommended puree diet and moderately thick liquids. A modified barium swallow was conducted, but incomplete due to poor patient cooperation. Pt was started on soft and bite sized diet and moderately thick liquids after extensive discussion with speech and swallow, pt's wife, gastroenterology, and the primary team.  S & S follow up as pt does NOT want to eat Dysphagia diet  Pt did NOT participate in MBS for Liquid portion     PRINCIPAL DISCHARGE DIAGNOSIS  Diagnosis: AMS (altered mental status)  Assessment and Plan of Treatment: You were admitted for AMS & weakness and seen by neurology. The etiology was unclear, but may have been due to chronic subdural hematoma and metabolic encophalopathy. Please follow up with your primary care provider and neurology in 1 week.  EEG at [facility] & further work up as per Neuro SX Team as Chronic SDH & ICH      SECONDARY DISCHARGE DIAGNOSES  Diagnosis: Sepsis  Assessment and Plan of Treatment: You were found to have a fever and leukocytosis  2/2 Aspiration PNA Likely , NL WBC Now, sepsis Resolved , seen By ID DR momin   -S/P on IV zosyn completed transitioned to oral antibiotics completed PO Abx  - Your blood cultures were negative, but urine cultures grew klebsiella bacteria. Please follow up with your primary care provider in 1 week.    Diagnosis: Oropharyngeal dysphagia  Assessment and Plan of Treatment: You were seen by Speech and swallow and was recommended puree diet and moderately thick liquids.   A modified barium swallow was conducted, but incomplete due to poor patient cooperation.  - Pt was started on soft and bite sized diet and moderately thick liquids after extensive discussion with speech and swallow, pt's wife, gastroenterology, and the primary team.  S & S follow up as pt does NOT want to eat Dysphagia diet & Thickened Liquid   Pt did NOT participate in MBS for Liquid portion    Diagnosis: Motor system disease  Assessment and Plan of Treatment: Chronic Bed bound   -On Ritalin 2x day    Diagnosis: History of diabetes mellitus  Assessment and Plan of Treatment: FS q AC HS with HISS  Poor po intake, Oral meds on HOLD concern for Hypoglycemia once IVF is stopped    Diagnosis: HTN (hypertension)  Assessment and Plan of Treatment: You have a known history of hypertension, however your blood pressure has been relatively low during your stay and some of your BP meds were held.  - Continue holding your meds as directed until you see your primary care provider in 1 week.    Diagnosis: Peripheral neuropathy  Assessment and Plan of Treatment: on Neurontin 3x day    Diagnosis: Major depression  Assessment and Plan of Treatment: on Remeron 7.5 mg Q HS

## 2022-11-26 NOTE — PROGRESS NOTE ADULT - PROBLEM SELECTOR PLAN 5
Chronic  - Meds: Hydralazine 50 mg q8h and losartan 50 q12h  - Continue holding home BP meds as pt is within target BP range  - DC IV lopressor 5mg q6 Chronic  - Meds: Hydralazine 50 mg q8h and losartan 50 q12h  - Continue holding home BP meds as pt is within target BP range

## 2022-11-26 NOTE — DISCHARGE NOTE PROVIDER - NSDCFUSCHEDAPPT_GEN_ALL_CORE_FT
Mirta Goldsmith  [facility] Physician [facility]  CARDIOLOGY [address] [facility]  Scheduled Appointment: 02/13/2023    Richard Mathis  [facility] Physician [facility]  INTMED [address]  Scheduled Appointment: 02/22/2023

## 2022-11-26 NOTE — PROGRESS NOTE ADULT - ATTENDING COMMENTS
72 M BPH CAD, HTN, HLD, Afib, SDH s/p mechanical fall, recurrent ICH when on AC brought today for weakness. History obtained from pt's wife Love and chart review given patient medical condition. Pt admitted from 10/19 to 10/22/2022 at [facility] for SHD s/p adexxa. Pt's wife endorses that patient has been progressively deteriorating, states that now only is reactive to painful stimulus, prior used to articulate some words. Patient admitted for acute on chronic AMS.  Pt seen, examined, case & care plan d/w pt, residents at [facility].  Neurology-DR Main follow up -Pt for Transfer to [facility] for Neuro surgical Eval DR GONZALEZ   -ID DR giles-continue IV Zosyn q 8 hrs   -GI-DR Kaye -NO PEG , S & S --->Moderately Thicketed liquid with  Pureed diet  Palliative Care Eval -DNR/DNI   D/W Wife at [facility]   - aspiration Precaution.  SCD   PT/OT  Eval   Social service Eval.  Total care time 45 minutes.   Transfer pt to [facility] when bed available. wife aware.

## 2022-11-26 NOTE — PROGRESS NOTE ADULT - PROBLEM SELECTOR PLAN 7
Per wife patient has history of T2DM but resolved, not on meds.   - UA showing glucosuria   - HbA1c 9.3  - Low Insulin sliding scale  - Accu checks w/ hypoglycemic protocol  - Pt on med thick liquid diet

## 2022-11-26 NOTE — PROGRESS NOTE ADULT - PROBLEM SELECTOR PLAN 4
Chronic   - EKG shows Afib with RVR @ 110 bpm, Right axis deviation on admission  - Home Cardizem 120 mg CD qd  - Restart PO meds  - DC IV lopressor 5mg q6  - Replete mag and phos as needed  - Outpatient cardio Dr. Goldsmith N   NO AC 2/2 recent Brain Hemorrhage / SDH

## 2022-11-26 NOTE — PROGRESS NOTE ADULT - PROBLEM SELECTOR PLAN 2
Acute on chronic, per wife pt has been progressively deteriorating, reactive to painful stimulus only, prior used to articulate some words.  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  Mild mass effect with sulcal effacement and partial compression of the  left lateral ventricle. No new acute intracranial hemorrhage.  - Neuro Dr. Main recs: AMS/SDH,   - Dr. Main to speak to wife about further care   - MRI done 11/25   - GI Dr. Salgado -NO PEG as per Family   - Fall risk protocol, OOB with assistance  - PT /OT consulted  - Palliative care consulted for GOC-DNR/DNI Acute on chronic, per wife pt has been progressively deteriorating, reactive to painful stimulus only, prior used to articulate some words.  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  Mild mass effect with sulcal effacement and partial compression of the  left lateral ventricle. No new acute intracranial hemorrhage.  - Neuro Dr. Main recs: AMS/SDH,   - Dr. Main spoke to wife- plan for transfer to [facility] Neurosurgery for further management   - MRI done 11/25   - GI Dr. Salgado -NO PEG as per Family   - Fall risk protocol, OOB with assistance  - PT /OT consulted  - Palliative care consulted for GOC-DNR/DNI Acute on chronic, per wife pt has been progressively deteriorating, reactive to painful stimulus only, prior used to articulate some words.  -Unclear Etiology  ? SDH   - CT head showed chronic subdural hematomas overlying the left side of the posterior falx measuring up to 1.7 cm and 1.5 cm overlying the left temporal convexity.  Mild mass effect with sulcal effacement and partial compression of the  left lateral ventricle. No new acute intracranial hemorrhage.  - Neuro Dr. Main recs: AMS/SDH,   - Dr. Main spoke to wife- plan for transfer to [facility] Neurosurgery for further management   - MRI done 11/25   - GI Dr. Salgado -NO PEG as per Family   - PT /OT consulted  - Palliative care consulted for GOC-DNR/DNI

## 2022-11-26 NOTE — DISCHARGE NOTE PROVIDER - NSDCFUADDINST_GEN_ALL_CORE_FT
Transfer to [facility] Transfer to [facility] for Neuro surgical work up.  EEG at [facility], S & S follow up

## 2022-11-26 NOTE — PROGRESS NOTE ADULT - SUBJECTIVE AND OBJECTIVE BOX
[facility]  Lorenzo Adam PA-C  [address] [place] [address]  [phone]      INTERVAL HPI/OVERNIGHT EVENTS:  Pt s/e  offers no complaints  passed speech/ swallow   Palliative eval noted          MEDICATIONS  (STANDING):  dextrose 5% + sodium chloride 0.45% with potassium chloride 20 mEq/L 1000 milliLiter(s) (60 mL/Hr) IV Continuous <Continuous>  dextrose 5%. 1000 milliLiter(s) (100 mL/Hr) IV Continuous <Continuous>  dextrose 5%. 1000 milliLiter(s) (50 mL/Hr) IV Continuous <Continuous>  dextrose 50% Injectable 25 Gram(s) IV Push once  dextrose 50% Injectable 12.5 Gram(s) IV Push once  dextrose 50% Injectable 25 Gram(s) IV Push once  diltiazem    milliGRAM(s) Oral daily  gabapentin 400 milliGRAM(s) Oral three times a day  glucagon  Injectable 1 milliGRAM(s) IntraMuscular once  insulin lispro (ADMELOG) corrective regimen sliding scale   SubCutaneous every 6 hours  latanoprost 0.005% Ophthalmic Solution 1 Drop(s) Both EYES at bedtime  methylphenidate 20 milliGRAM(s) Oral daily  methylphenidate 10 milliGRAM(s) Oral daily  piperacillin/tazobactam IVPB.. 3.375 Gram(s) IV Intermittent every 8 hours    MEDICATIONS  (PRN):  acetaminophen  Suppository .. 650 milliGRAM(s) Rectal every 6 hours PRN Temp greater or equal to 38C (100.4F)  aluminum hydroxide/magnesium hydroxide/simethicone Suspension 30 milliLiter(s) Oral every 4 hours PRN Dyspepsia  dextrose Oral Gel 15 Gram(s) Oral once PRN Blood Glucose LESS THAN 70 milliGRAM(s)/deciliter  ondansetron Injectable 4 milliGRAM(s) IV Push every 8 hours PRN Nausea and/or Vomiting      Allergies    No Known Allergies    Intolerances          PHYSICAL EXAM  Vital Signs Last 24 Hrs  T(C): 36.8 (26 Nov 2022 04:32), Max: 37.1 (25 Nov 2022 12:50)  T(F): 98.2 (26 Nov 2022 04:32), Max: 98.8 (25 Nov 2022 12:50)  HR: 109 (26 Nov 2022 04:32) (90 - 109)  BP: 113/73 (26 Nov 2022 04:32) (113/73 - 121/83)  BP(mean): --  RR: 19 (26 Nov 2022 04:32) (18 - 19)  SpO2: 97% (26 Nov 2022 04:32) (96% - 97%)    Parameters below as of 26 Nov 2022 04:32  Patient On (Oxygen Delivery Method): nasal cannula  O2 Flow (L/min): 2            GENERAL:  Appears stated age  ABDOMEN:  Soft, non-tender, non-distended  NEURO:  Alert    LABS:                        10.5   13.83 )-----------( 149      ( 26 Nov 2022 07:52 )             32.3     11-26    148<H>  |  118<H>  |  10  ----------------------------<  99  3.1<L>   |  23  |  0.68    Ca    8.1<L>      26 Nov 2022 07:52  Phos  1.9     11-26  Mg     1.8     11-26 11-25-22 @ 07:01  -  11-26-22 @ 07:00  --------------------------------------------------------  IN: 300 mL / OUT: 0 mL / NET: 300 mL                Culture - Blood (collected 24 Nov 2022 06:40)  Source: .Blood Blood-Peripheral  Preliminary Report (25 Nov 2022 14:01):    No growth to date.    Culture - Blood (collected 24 Nov 2022 06:37)  Source: .Blood Blood-Peripheral  Preliminary Report (25 Nov 2022 14:01):    No growth to date.    Leuk Esterase: Moderate / RBC: >50 /HPF / WBC >50   Sq Epi: x / Non Sq Epi: Few / Bacteria: Few

## 2022-11-26 NOTE — DISCHARGE NOTE PROVIDER - HOSPITAL COURSE
FROM ADMISSION H+P:   HPI:  72M BPH CAD, HTN, HLD, Afib, SDH s/p mechanical fall, recurrent ICH when on AC brought today for weakness. History obtained from pt's wife Love and chart review given patient medical condition. Pt admitted from 10/19 to 10/22/2022 at [facility] for SHD s/p adexxa. Pt's wife endorses that patient has been progressively deteriorating, states that now only is reactive to painful stimulus, prior used to articulate some words. Patient completed 2 weeks of outpatient PT, has 8-10 hours nurse aid daily. Temp at home 99.9. Wife assist with feeding, was seen by neuro who start mirtazapine and sent for neurologic work up.     ED course  VS: Afebrile, HR: 96, BP: 154/97, SpO2: 97% RA RR 19  Labs significant for glucose 227, Alkaline phosphate 227, UA shows glucose 250  CT head showed chronic subdural hematomas overlying the left side of the posterior falx measuring up to 1.7 cm and 1.5 cm overlying the left temporal convexity.  Mild mass effect with sulcal effacement and partial compression of the  left lateral ventricle. No new acute intracranial hemorrhage.  EKG shows Afib with RVR @ 110 bpm, Right axis deviation    (23 Nov 2022 01:11)      ---  HOSPITAL COURSE: 72M BPH CAD, HTN, HLD, Afib, SDH s/p mechanical fall, recurrent ICH when on AC brought to the ED for weakness. Patient was found to be septic on admission with possible aspiration. Patient was started on IV Zosyn and seen by infectious disease. Patient was additionally progressively deteriorating, reactive to painful stimulus only, prior used to articulate some words. CT head showed chronic subdural hematomas overlying the left side of the posterior falx measuring up to 1.7 cm and 1.5 cm overlying the left temporal convexity.  Mild mass effect with sulcal effacement and partial compression of the  left lateral ventricle. No new acute intracranial hemorrhage. Palliative was consulted and patient was made DNR/DNI. Neurology was consulted and brain MRI was ordered which showed -1. Left lateral convexity subacute to long-standing subdural hematoma,   unchanged from 11/22/2022, increased from 10/20/2022 2. Left posterior parafalcine subacute to long-standing subdural hematoma, unchanged since 10/20/2022 3. Left posterior convexity subarachnoid space hemorrhage (favored over artifact) 4. Right frontal and right temporal tip focal encephalomalacia consistent with posttraumatic etiology 5. Severe cerebral hemispheric white matter abnormality likely reflects both ischemia and posttraumatic injury 6. Diffuse brain volume loss greater than typical for age consistent with   Central cerebral hemispheric white matter volume loss.   Pt admitted from 10/19 to 10/22/2022 at [facility] for SHD s/p adexxa. Off of AC.   Patient was seen by GI and recommended PEG tube, patient's wife refused. Patient was seen by Speech and swallow and was recommended puree diet and moderately thick liquids.   Patient was medically optimized for transfer.     ---  CONSULTANTS:   Palliative- Dr. Devendra Jean  Neuro- Dr. Jose David MARIE- Dr. Boggs  GI- Dr. Kaye  ---  TIME SPENT:  I, the attending physician, was physically present for the key portions of the evaluation and management (E/M) service provided. The total amount of time spent reviewing the hospital notes, laboratory values, imaging findings, assessing/counseling the patient, discussing with consultant physicians, social work, nursing staff was -- minutes     FROM ADMISSION H+P:   HPI:  72M BPH CAD, HTN, HLD, Afib, SDH s/p mechanical fall, recurrent ICH when on AC brought today for weakness. History obtained from pt's wife Love and chart review given patient medical condition. Pt admitted from 10/19 to 10/22/2022 at [facility] for SHD s/p adexxa. Pt's wife endorses that patient has been progressively deteriorating, states that now only is reactive to painful stimulus, prior used to articulate some words. Patient completed 2 weeks of outpatient PT, has 8-10 hours nurse aid daily. Temp at home 99.9. Wife assist with feeding, was seen by neuro who start mirtazapine and sent for neurologic work up.     ED course  VS: Afebrile, HR: 96, BP: 154/97, SpO2: 97% RA RR 19  Labs significant for glucose 227, Alkaline phosphate 227, UA shows glucose 250  CT head showed chronic subdural hematomas overlying the left side of the posterior falx measuring up to 1.7 cm and 1.5 cm overlying the left temporal convexity.  Mild mass effect with sulcal effacement and partial compression of the  left lateral ventricle. No new acute intracranial hemorrhage.  EKG shows Afib with RVR @ 110 bpm, Right axis deviation    (23 Nov 2022 01:11)      ---  HOSPITAL COURSE: 72M BPH CAD, HTN, HLD, Afib, SDH s/p mechanical fall, recurrent ICH when on AC brought to the ED for weakness. Patient was found to be septic on admission with possible aspiration. Patient was started on IV Zosyn and seen by infectious disease. Patient was additionally progressively deteriorating, reactive to painful stimulus only, prior used to articulate some words. CT head showed chronic subdural hematomas overlying the left side of the posterior falx measuring up to 1.7 cm and 1.5 cm overlying the left temporal convexity.  Mild mass effect with sulcal effacement and partial compression of the  left lateral ventricle. No new acute intracranial hemorrhage. Palliative was consulted and patient was made DNR/DNI. Neurology was consulted and brain MRI was ordered which showed -1. Left lateral convexity subacute to long-standing subdural hematoma,   unchanged from 11/22/2022, increased from 10/20/2022 2. Left posterior parafalcine subacute to long-standing subdural hematoma, unchanged since 10/20/2022 3. Left posterior convexity subarachnoid space hemorrhage (favored over artifact) 4. Right frontal and right temporal tip focal encephalomalacia consistent with posttraumatic etiology 5. Severe cerebral hemispheric white matter abnormality likely reflects both ischemia and posttraumatic injury 6. Diffuse brain volume loss greater than typical for age consistent with   Central cerebral hemispheric white matter volume loss.   Pt admitted from 10/19 to 10/22/2022 at [facility] for SHD s/p adexxa. Off of AC.   Patient was seen by GI and recommended PEG tube, patient's wife refused. Patient was seen by Speech and swallow and was recommended puree diet and moderately thick liquids.   Patient was medically optimized for transfer to [facility] for Neurosurgical intervention & Possible EEG , Continue IV Abx Zosyn for sepsis/RT LL PNA , Continue IVF , DR Main d/w Neuro SX DR LISA cronin [facility]     ---  CONSULTANTS:   Palliative- Dr. Devendra Jean  Neuro- Dr. Main   ID- Dr. Boggs  GI- Dr. Kaye  ---  TIME SPENT:  I, the attending physician, was physically present for the key portions of the evaluation and management (E/M) service provided. The total amount of time spent reviewing the hospital notes, laboratory values, imaging findings, assessing/counseling the patient, discussing with consultant physicians, social work, nursing staff was 60 minutes     FROM ADMISSION H+P:   HPI:  72M BPH CAD, HTN, HLD, Afib, SDH s/p mechanical fall, recurrent ICH when on AC brought today for weakness. History obtained from pt's wife Love and chart review given patient medical condition. Pt admitted from 10/19 to 10/22/2022 at [facility] for SHD s/p adexxa. Pt's wife endorses that patient has been progressively deteriorating, states that now only is reactive to painful stimulus, prior used to articulate some words. Patient completed 2 weeks of outpatient PT, has 8-10 hours nurse aid daily. Temp at home 99.9. Wife assist with feeding, was seen by neuro who start mirtazapine and sent for neurologic work up.     ED course  VS: Afebrile, HR: 96, BP: 154/97, SpO2: 97% RA RR 19  Labs significant for glucose 227, Alkaline phosphate 227, UA shows glucose 250  CT head showed chronic subdural hematomas overlying the left side of the posterior falx measuring up to 1.7 cm and 1.5 cm overlying the left temporal convexity.  Mild mass effect with sulcal effacement and partial compression of the  left lateral ventricle. No new acute intracranial hemorrhage.  EKG shows Afib with RVR @ 110 bpm, Right axis deviation    (23 Nov 2022 01:11)      ---  HOSPITAL COURSE: 72M BPH CAD, HTN, HLD, Afib, SDH s/p mechanical fall, recurrent ICH when on AC brought to the ED for weakness. Patient was found to be septic on admission with possible aspiration. Patient was started on IV Zosyn and seen by infectious disease. Patient was additionally progressively deteriorating, reactive to painful stimulus only, prior used to articulate some words. CT head showed chronic subdural hematomas overlying the left side of the posterior falx measuring up to 1.7 cm and 1.5 cm overlying the left temporal convexity.  Mild mass effect with sulcal effacement and partial compression of the  left lateral ventricle. No new acute intracranial hemorrhage. Palliative was consulted and patient was made DNR/DNI. Neurology was consulted and brain MRI was ordered which showed -1. Left lateral convexity subacute to long-standing subdural hematoma,   unchanged from 11/22/2022, increased from 10/20/2022 2. Left posterior parafalcine subacute to long-standing subdural hematoma, unchanged since 10/20/2022 3. Left posterior convexity subarachnoid space hemorrhage (favored over artifact) 4. Right frontal and right temporal tip focal encephalomalacia consistent with posttraumatic etiology 5. Severe cerebral hemispheric white matter abnormality likely reflects both ischemia and posttraumatic injury 6. Diffuse brain volume loss greater than typical for age consistent with   Central cerebral hemispheric white matter volume loss.   Pt admitted from 10/19 to 10/22/2022 at [facility] for SHD s/p adexxa. Off of AC.   Patient was seen by GI and recommended PEG tube, patient's wife refused. Patient was seen by Speech and swallow and was recommended puree diet and moderately thick liquids.   Patient was medically optimized for transfer to [facility] for Neurosurgical intervention & Possible EEG , Continue IV Abx Zosyn for sepsis/RT LL PNA , Continue IVF , DR Main d/w Neuro SX DR GONZALEZ about Transfer for Neurosx Eval, EEG at [facility]     ---  CONSULTANTS:   Palliative- Dr. Devendra Jean  Neuro- Dr. Main   ID- Dr. Ambrose NUÑEZ- Dr. Kaye  ---  TIME SPENT:  I, the attending physician, was physically present for the key portions of the evaluation and management (E/M) service provided. The total amount of time spent reviewing the hospital notes, laboratory values, imaging findings, assessing/counseling the patient, discussing with consultant physicians, social work, nursing staff was 60 minutes     FROM ADMISSION H+P:   HPI:  72M BPH CAD, HTN, HLD, Afib, SDH s/p mechanical fall, recurrent ICH when on AC brought today for weakness. History obtained from pt's wife Love and chart review given patient medical condition. Pt admitted from 10/19 to 10/22/2022 at [facility] for SHD s/p adexxa. Pt's wife endorses that patient has been progressively deteriorating, states that now only is reactive to painful stimulus, prior used to articulate some words. Patient completed 2 weeks of outpatient PT, has 8-10 hours nurse aid daily. Temp at home 99.9. Wife assist with feeding, was seen by neuro who start mirtazapine and sent for neurologic work up.     ED course  VS: Afebrile, HR: 96, BP: 154/97, SpO2: 97% RA RR 19  Labs significant for glucose 227, Alkaline phosphate 227, UA shows glucose 250  CT head showed chronic subdural hematomas overlying the left side of the posterior falx measuring up to 1.7 cm and 1.5 cm overlying the left temporal convexity.  Mild mass effect with sulcal effacement and partial compression of the  left lateral ventricle. No new acute intracranial hemorrhage.  EKG shows Afib with RVR @ 110 bpm, Right axis deviation    (23 Nov 2022 01:11)      ---  HOSPITAL COURSE: 72M BPH CAD, HTN, HLD, Afib, SDH s/p mechanical fall, recurrent ICH when on AC brought to the ED for weakness. Patient was found to be septic on admission with possible aspiration. Patient was started on IV Zosyn eventually transitioned to oral cefuroxime, (seen by infectious disease). Blood cultures were negative, but urine cultures grew klebsiella. Patient was additionally progressively deteriorating, reactive to painful stimulus only, prior used to articulate some words. 11/22 CT of head revealed chronic subdural hematoma and no new acute intracranial hemorrhage. Palliative was consulted and patient was made DNR/DNI. Patient was seen by GI and recommended PEG tube, patient's wife refused. Patient was seen by Speech and swallow and was recommended puree diet and moderately thick liquids. A modified barium study was done, but was incomplete due to poor patient cooperation; pt was started on soft+bite sized diet w/ moderately thick liquids. Neurology was consulted and brain MRI was ordered which showed: Chronic SDHs, Left posterior convexity subarachnoid space hemorrhage (favored over artifact), right frontal and right temporal tip focal encephalomalacia consistent with posttraumatic etiology, severe cerebral hemispheric white matter abnormality likely reflects both ischemia and posttraumatic injury, and diffuse brain volume loss greater than typical for age.      Patient was medically optimized for transfer to [facility] for Neurosurgical evaluation/intervention & EEG.    ---  CONSULTANTS:   Palliative- Dr. Devendra Jean  Neuro- Dr. Jose David MARIE- Dr. Boggs  GI- Dr. Kaye  ---  TIME SPENT:  I, the attending physician, was physically present for the key portions of the evaluation and management (E/M) service provided. The total amount of time spent reviewing the hospital notes, laboratory values, imaging findings, assessing/counseling the patient, discussing with consultant physicians, social work, nursing staff was 60 minutes     FROM ADMISSION H+P:   HPI:72M BPH CAD, HTN, HLD, Afib, SDH s/p mechanical fall, recurrent ICH when on AC brought today for weakness. History obtained from pt's wife Love and chart review given patient medical condition. Pt admitted from 10/19 to 10/22/2022 at [facility] for SHD s/p adexxa. Pt's wife endorses that patient has been progressively deteriorating, states that now only is reactive to painful stimulus, prior used to articulate some words. Patient completed 2 weeks of outpatient PT, has 8-10 hours nurse aid daily. Temp at home 99.9. Wife assist with feeding, was seen by neuro who start mirtazapine and sent for neurologic work up.     ED course  VS: Afebrile, HR: 96, BP: 154/97, SpO2: 97% RA RR 19  Labs significant for glucose 227, Alkaline phosphate 227, UA shows glucose 250  CT head showed chronic subdural hematomas overlying the left side of the posterior falx measuring up to 1.7 cm and 1.5 cm overlying the left temporal convexity.  Mild mass effect with sulcal effacement and partial compression of the  left lateral ventricle. No new acute intracranial hemorrhage.  EKG shows Afib with RVR @ 110 bpm, Right axis deviation    (23 Nov 2022 01:11)      ---  HOSPITAL COURSE: 72M BPH CAD, HTN, HLD, Afib, SDH s/p mechanical fall, recurrent ICH when on AC brought to the ED for weakness. Patient was found to be septic on admission with possible aspiration. Patient was started on IV Zosyn eventually transitioned to oral cefuroxime, (seen by infectious disease). Blood cultures were negative, but urine cultures grew klebsiella. Patient was additionally progressively deteriorating, reactive to painful stimulus only, prior used to articulate some words. 11/22 CT of head revealed chronic subdural hematoma and no new acute intracranial hemorrhage. Palliative was consulted and patient was made DNR/DNI. Patient was seen by GI and recommended PEG tube, patient's wife refused. Patient was seen by Speech and swallow and was recommended puree diet and moderately thick liquids. A modified barium study was done, but was incomplete due to poor patient cooperation; pt was started on soft+bite sized diet w/ moderately thick liquids. Neurology was consulted and brain MRI was ordered which showed: Chronic SDHs, Left posterior convexity subarachnoid space hemorrhage (favored over artifact), right frontal and right temporal tip focal encephalomalacia consistent with posttraumatic etiology, severe cerebral hemispheric white matter abnormality likely reflects both ischemia and posttraumatic injury, and diffuse brain volume loss greater than typical for age.      Patient was medically optimized for transfer to [facility] for Neurosurgical evaluation/intervention & EEG.    ---  CONSULTANTS:   Palliative- Dr. Devendra Jean  Neuro- Dr. Jose David MARIE- Dr. Boggs  GI- Dr. Kaye  ---  TIME SPENT:  I, the attending physician, was physically present for the key portions of the evaluation and management (E/M) service provided. The total amount of time spent reviewing the hospital notes, laboratory values, imaging findings, assessing/counseling the patient, discussing with consultant physicians, social work, nursing staff was 60 minutes     FROM ADMISSION H+P:   HPI:72M BPH CAD, HTN, HLD, Afib, SDH s/p mechanical fall, recurrent ICH when on AC brought today for weakness. History obtained from pt's wife Love and chart review given patient medical condition. Pt admitted from 10/19 to 10/22/2022 at [facility] for SHD s/p adexxa. Pt's wife endorses that patient has been progressively deteriorating, states that now only is reactive to painful stimulus, prior used to articulate some words. Patient completed 2 weeks of outpatient PT, has 8-10 hours nurse aid daily. Temp at home 99.9. Wife assist with feeding, was seen by neuro who start mirtazapine and sent for neurologic work up.     ED course  VS: Afebrile, HR: 96, BP: 154/97, SpO2: 97% RA RR 19  Labs significant for glucose 227, Alkaline phosphate 227, UA shows glucose 250  CT head showed chronic subdural hematomas overlying the left side of the posterior falx measuring up to 1.7 cm and 1.5 cm overlying the left temporal convexity.  Mild mass effect with sulcal effacement and partial compression of the  left lateral ventricle. No new acute intracranial hemorrhage.  EKG shows Afib with RVR @ 110 bpm, Right axis deviation    (23 Nov 2022 01:11)      ---  HOSPITAL COURSE: 72M BPH CAD, HTN, HLD, Afib, SDH s/p mechanical fall, recurrent ICH when on AC brought to the ED for weakness. Patient was found to be septic on admission with possible aspiration. Patient was started on IV Zosyn eventually transitioned to oral cefuroxime, (seen by infectious disease). Blood cultures were negative, but urine cultures grew klebsiella. Patient was additionally progressively deteriorating, reactive to painful stimulus only, prior used to articulate some words. 11/22 CT of head revealed chronic subdural hematoma and no new acute intracranial hemorrhage. Palliative was consulted and patient was made DNR/DNI. Patient was seen by GI and recommended PEG tube, patient's wife refused. Patient was seen by Speech and swallow and was recommended puree diet and moderately thick liquids. A modified barium study was done, but was incomplete due to poor patient cooperation; pt was started on soft+bite sized diet w/ moderately thick liquids. Neurology was consulted and brain MRI was ordered which showed: Chronic SDHs, Left posterior convexity subarachnoid space hemorrhage (favored over artifact), right frontal and right temporal tip focal encephalomalacia consistent with posttraumatic etiology, severe cerebral hemispheric white matter abnormality likely reflects both ischemia and posttraumatic injury, and diffuse brain volume loss greater than typical for age. pt needs EEG at [facility] & Other work up.      Patient was medically optimized for transfer to [facility] for Neurosurgical evaluation/intervention & EEG.    ---  CONSULTANTS:   Palliative- Dr. Devendra Jean  Neuro- Dr. Jose David MARIE- Dr. Ambrose NUÑEZ- Dr. Kaye  ---  TIME SPENT:  I, the attending physician, was physically present for the key portions of the evaluation and management (E/M) service provided. The total amount of time spent reviewing the hospital notes, laboratory values, imaging findings, assessing/counseling the patient, discussing with consultant physicians, social work, nursing staff was 60 minutes

## 2022-11-26 NOTE — DISCHARGE NOTE PROVIDER - CARE PROVIDERS DIRECT ADDRESSES
,[email or web address]

## 2022-11-26 NOTE — PROGRESS NOTE ADULT - PROBLEM SELECTOR PLAN 9
Chronic   - Mirtazapine, d/c'd due to mental status  - Oral meds held for now Chronic   - Mirtazapine, d/c'd due to mental status/ lethargy  - Oral meds held for now

## 2022-11-27 LAB
-  AMIKACIN: SIGNIFICANT CHANGE UP
-  AMOXICILLIN/CLAVULANIC ACID: SIGNIFICANT CHANGE UP
-  AMPICILLIN/SULBACTAM: SIGNIFICANT CHANGE UP
-  AMPICILLIN: SIGNIFICANT CHANGE UP
-  AZTREONAM: SIGNIFICANT CHANGE UP
-  CEFAZOLIN: SIGNIFICANT CHANGE UP
-  CEFEPIME: SIGNIFICANT CHANGE UP
-  CEFOXITIN: SIGNIFICANT CHANGE UP
-  CEFTRIAXONE: SIGNIFICANT CHANGE UP
-  CIPROFLOXACIN: SIGNIFICANT CHANGE UP
-  ERTAPENEM: SIGNIFICANT CHANGE UP
-  GENTAMICIN: SIGNIFICANT CHANGE UP
-  IMIPENEM: SIGNIFICANT CHANGE UP
-  LEVOFLOXACIN: SIGNIFICANT CHANGE UP
-  MEROPENEM: SIGNIFICANT CHANGE UP
-  NITROFURANTOIN: SIGNIFICANT CHANGE UP
-  PIPERACILLIN/TAZOBACTAM: SIGNIFICANT CHANGE UP
-  TOBRAMYCIN: SIGNIFICANT CHANGE UP
-  TRIMETHOPRIM/SULFAMETHOXAZOLE: SIGNIFICANT CHANGE UP
ALBUMIN SERPL ELPH-MCNC: 1.9 G/DL — LOW (ref 3.3–5)
ALP SERPL-CCNC: 93 U/L — SIGNIFICANT CHANGE UP (ref 40–120)
ALT FLD-CCNC: 44 U/L — SIGNIFICANT CHANGE UP (ref 12–78)
ANION GAP SERPL CALC-SCNC: 6 MMOL/L — SIGNIFICANT CHANGE UP (ref 5–17)
AST SERPL-CCNC: 36 U/L — SIGNIFICANT CHANGE UP (ref 15–37)
BILIRUB SERPL-MCNC: 0.6 MG/DL — SIGNIFICANT CHANGE UP (ref 0.2–1.2)
BUN SERPL-MCNC: 7 MG/DL — SIGNIFICANT CHANGE UP (ref 7–23)
CALCIUM SERPL-MCNC: 8 MG/DL — LOW (ref 8.5–10.1)
CHLORIDE SERPL-SCNC: 113 MMOL/L — HIGH (ref 96–108)
CO2 SERPL-SCNC: 21 MMOL/L — LOW (ref 22–31)
CREAT SERPL-MCNC: 0.79 MG/DL — SIGNIFICANT CHANGE UP (ref 0.5–1.3)
CULTURE RESULTS: SIGNIFICANT CHANGE UP
EGFR: 96 ML/MIN/1.73M2 — SIGNIFICANT CHANGE UP
GLUCOSE SERPL-MCNC: 183 MG/DL — HIGH (ref 70–99)
HCT VFR BLD CALC: 31.5 % — LOW (ref 39–50)
HGB BLD-MCNC: 10.4 G/DL — LOW (ref 13–17)
MAGNESIUM SERPL-MCNC: 1.7 MG/DL — SIGNIFICANT CHANGE UP (ref 1.6–2.6)
MCHC RBC-ENTMCNC: 26.1 PG — LOW (ref 27–34)
MCHC RBC-ENTMCNC: 33 GM/DL — SIGNIFICANT CHANGE UP (ref 32–36)
MCV RBC AUTO: 79.1 FL — LOW (ref 80–100)
METHOD TYPE: SIGNIFICANT CHANGE UP
NRBC # BLD: 0 /100 WBCS — SIGNIFICANT CHANGE UP (ref 0–0)
ORGANISM # SPEC MICROSCOPIC CNT: SIGNIFICANT CHANGE UP
ORGANISM # SPEC MICROSCOPIC CNT: SIGNIFICANT CHANGE UP
PHOSPHATE SERPL-MCNC: 1.8 MG/DL — LOW (ref 2.5–4.5)
PLATELET # BLD AUTO: 168 K/UL — SIGNIFICANT CHANGE UP (ref 150–400)
POTASSIUM SERPL-MCNC: 4.2 MMOL/L — SIGNIFICANT CHANGE UP (ref 3.5–5.3)
POTASSIUM SERPL-SCNC: 4.2 MMOL/L — SIGNIFICANT CHANGE UP (ref 3.5–5.3)
PROT SERPL-MCNC: 5.3 G/DL — LOW (ref 6–8.3)
RBC # BLD: 3.98 M/UL — LOW (ref 4.2–5.8)
RBC # FLD: 16.2 % — HIGH (ref 10.3–14.5)
SARS-COV-2 RNA SPEC QL NAA+PROBE: SIGNIFICANT CHANGE UP
SODIUM SERPL-SCNC: 140 MMOL/L — SIGNIFICANT CHANGE UP (ref 135–145)
SPECIMEN SOURCE: SIGNIFICANT CHANGE UP
WBC # BLD: 10.23 K/UL — SIGNIFICANT CHANGE UP (ref 3.8–10.5)
WBC # FLD AUTO: 10.23 K/UL — SIGNIFICANT CHANGE UP (ref 3.8–10.5)

## 2022-11-27 RX ORDER — POTASSIUM PHOSPHATE, MONOBASIC POTASSIUM PHOSPHATE, DIBASIC 236; 224 MG/ML; MG/ML
30 INJECTION, SOLUTION INTRAVENOUS ONCE
Refills: 0 | Status: DISCONTINUED | OUTPATIENT
Start: 2022-11-27 | End: 2022-11-27

## 2022-11-27 RX ORDER — SODIUM CHLORIDE 9 MG/ML
1000 INJECTION, SOLUTION INTRAVENOUS
Qty: 0 | Refills: 0 | DISCHARGE
Start: 2022-11-27

## 2022-11-27 RX ORDER — POTASSIUM PHOSPHATE, MONOBASIC POTASSIUM PHOSPHATE, DIBASIC 236; 224 MG/ML; MG/ML
15 INJECTION, SOLUTION INTRAVENOUS ONCE
Refills: 0 | Status: DISCONTINUED | OUTPATIENT
Start: 2022-11-27 | End: 2022-11-27

## 2022-11-27 RX ORDER — SODIUM CHLORIDE 9 MG/ML
1000 INJECTION, SOLUTION INTRAVENOUS
Refills: 0 | Status: DISCONTINUED | OUTPATIENT
Start: 2022-11-27 | End: 2022-11-28

## 2022-11-27 RX ADMIN — Medication 2: at 18:04

## 2022-11-27 RX ADMIN — DEXTROSE MONOHYDRATE, SODIUM CHLORIDE, AND POTASSIUM CHLORIDE 80 MILLILITER(S): 50; .745; 4.5 INJECTION, SOLUTION INTRAVENOUS at 00:53

## 2022-11-27 RX ADMIN — GABAPENTIN 400 MILLIGRAM(S): 400 CAPSULE ORAL at 22:26

## 2022-11-27 RX ADMIN — SODIUM CHLORIDE 80 MILLILITER(S): 9 INJECTION, SOLUTION INTRAVENOUS at 20:22

## 2022-11-27 RX ADMIN — GABAPENTIN 400 MILLIGRAM(S): 400 CAPSULE ORAL at 06:21

## 2022-11-27 RX ADMIN — PIPERACILLIN AND TAZOBACTAM 25 GRAM(S): 4; .5 INJECTION, POWDER, LYOPHILIZED, FOR SOLUTION INTRAVENOUS at 20:22

## 2022-11-27 RX ADMIN — Medication 1: at 06:21

## 2022-11-27 RX ADMIN — Medication 10 MILLIGRAM(S): at 14:32

## 2022-11-27 RX ADMIN — Medication 120 MILLIGRAM(S): at 06:21

## 2022-11-27 RX ADMIN — SODIUM CHLORIDE 80 MILLILITER(S): 9 INJECTION, SOLUTION INTRAVENOUS at 12:30

## 2022-11-27 RX ADMIN — Medication 1: at 01:08

## 2022-11-27 RX ADMIN — Medication 100 MILLIEQUIVALENT(S): at 03:39

## 2022-11-27 RX ADMIN — Medication 85 MILLIMOLE(S): at 14:32

## 2022-11-27 RX ADMIN — Medication 100 MILLIEQUIVALENT(S): at 01:56

## 2022-11-27 RX ADMIN — LATANOPROST 1 DROP(S): 0.05 SOLUTION/ DROPS OPHTHALMIC; TOPICAL at 22:26

## 2022-11-27 RX ADMIN — GABAPENTIN 400 MILLIGRAM(S): 400 CAPSULE ORAL at 14:33

## 2022-11-27 RX ADMIN — SODIUM CHLORIDE 80 MILLILITER(S): 9 INJECTION, SOLUTION INTRAVENOUS at 22:31

## 2022-11-27 RX ADMIN — PIPERACILLIN AND TAZOBACTAM 25 GRAM(S): 4; .5 INJECTION, POWDER, LYOPHILIZED, FOR SOLUTION INTRAVENOUS at 09:50

## 2022-11-27 RX ADMIN — Medication 100 MILLIEQUIVALENT(S): at 05:18

## 2022-11-27 RX ADMIN — Medication 20 MILLIGRAM(S): at 09:05

## 2022-11-27 NOTE — SWALLOW BEDSIDE ASSESSMENT ADULT - SLP PERTINENT HISTORY OF CURRENT PROBLEM
Per charting, the patient is a "72 M BPH CAD, HTN, HLD, Afib, SDH s/p mechanical fall, recurrent ICH when on AC brought today for weakness. History obtained from pt's wife Love and chart review given patient medical condition. Pt admitted from 10/19 to 10/22/2022 at [facility] for SHD s/p adexxa. Pt's wife endorses that patient has been progressively deteriorating, states that now only is reactive to painful stimulus, prior used to articulate some words. Patient admitted for acute on chronic AMS."

## 2022-11-27 NOTE — PROGRESS NOTE ADULT - PROBLEM SELECTOR PLAN 1
Fever- Sepsis with Leukocytosis, Hypotension -Improving   Suspect  possible Aspiration PNA   - 11/22 Blood x2 and urine culture NGTD  - 11/24 CXR: Right-sided infiltrate; aspiration PNA   - WBC downtrending now 13.83; continue to monitor  - Continue Zosyn IVPB q 8 hrs  - Continue on mod thick liquids w/ aspiration precautions  - IVF D5 1/2 NS 80 ml/hr w/ IV potassium  - Tylenol 650 mg MI  Suppository   - ID Dr Boggs d/w -continue IV Zosyn Fever- Sepsis with Leukocytosis, Hypotension -Improving   Suspect  possible Aspiration PNA   - 11/22 Blood x2 and urine culture NGTD  - 11/24 CXR: Right-sided infiltrate; aspiration PNA   - WBC downtrending now 10k; continue to monitor  - Continue Zosyn IVPB q 8 hrs  - Continue on mod thick liquids w/ aspiration precautions  - IVF D5 1/2 NS 80 ml/hr  - Tylenol 650 mg AK  Suppository   - ID Dr Boggs d/w -continue IV Zosyn Fever- Sepsis with Leukocytosis, Hypotension -Improving , Sepsis -RESOLVED   Suspect  possible Aspiration PNA   - 11/22 Blood x2 and urine culture NGTD  - 11/24 CXR: Right-sided infiltrate; aspiration PNA   - WBC downtrending now 10k; continue to monitor  - Continue Zosyn IVPB q 8 hrs  - Continue on mod thick liquids w/ aspiration precautions  - IVF D5 1/2 NS 80 ml/hr  - Tylenol 650 mg UT  Suppository   - ID Dr Boggs d/w -continue IV Zosyn IV q 8 hrs

## 2022-11-27 NOTE — PROGRESS NOTE ADULT - PROBLEM SELECTOR PLAN 4
Chronic   - EKG shows Afib with RVR @ 110 bpm, Right axis deviation on admission  - Home Cardizem 120 mg CD qd  - Replete mag and phos as needed  - Outpatient cardio Dr. Goldsmith N   NO AC 2/2 recent Brain Hemorrhage / SDH

## 2022-11-27 NOTE — SWALLOW BEDSIDE ASSESSMENT ADULT - SWALLOW EVAL: DIAGNOSIS
1. Mild-moderate oral dysphagia for pureed and moderately thick liquids marked by reduced stripping of bolus from utensil with prolonged manipulation resulting in delayed collection and transport. Bolus hold with moderately thick liquids x1 requiring verbal cues. 2. Moderate oral dysphagia for minced & moist, mildly thick, and thin liquids marked by prolonged mastication/manipulation with delayed collection, intermittent bolus hold across all trials noted requiring verbal cues to facilitate transport and suspected posterior loss at BOT. Mild stasis observed post swallow with minced & moist which reduced when provided liquid wash. 3. Mild pharyngeal dysphagia for pureed, minced & moist, moderately thick, and mildly thick liquids marked by suspected delayed pharyngeal swallow trigger and hyolaryngeal elevation noted by digital palpation without evidence of airway penetration/aspiration. 4. Moderate-severe pharyngeal dysphagia for thin liquids marked by
Mild-moderate oral dysphagia with puree and moderately thick liquids marked by reduced oral grading, pt accepting ~1/4 teaspoon trials, intermittent increased oral transit time, suspect posterior loss, one instance of oral holding with moderately thick liquids requiring cues to elicit swallow.  Pharyngeal stage deemed functional for puree and moderately thick liquids, no overt s/s penetration/aspiration noted.  No further consistencies assessed due to pt declining further trials.  Pt with improved participation and PO acceptance with wife present, therefore further suspect PO acceptance and overall swallow function impacted by reduced cognition and behavioral overlay.  It should be noted that this assessment is only one segment in time and does not capture pt over the course of a meal.  If pt with decline in mentation, inability to tolerate PO/overt s/s penetration/aspiration noted, Rx to discontinue PO and resume NPO with SLP to reassess.

## 2022-11-27 NOTE — SWALLOW BEDSIDE ASSESSMENT ADULT - SWALLOW EVAL: RECOMMENDED DIET
Puree with Moderately thick liquids, all Moderately thick liquids via teaspoon
pureed and moderately thick liquids via teaspoon only with aspiration precautions and full assistance during meals

## 2022-11-27 NOTE — SWALLOW BEDSIDE ASSESSMENT ADULT - ORAL PHASE
Decreased anterior-posterior movement of the bolus/Delayed oral transit time intermittent bolus hold/Decreased anterior-posterior movement of the bolus/Delayed oral transit time/Lingual stasis intermittent bolus hold/Decreased anterior-posterior movement of the bolus/Delayed oral transit time

## 2022-11-27 NOTE — CHART NOTE - NSCHARTNOTEFT_GEN_A_CORE
Assessment: patient seen for malnutrition follow up  72y old  Male who presents with a chief complaint of acute on chronic AMS   patient is total feed not eating per CNA/RN   per RN wife bringing in food from home states knows what he likes to eat  11/26 fecal incontinence   K 3.1 IV KCL given      Factors impacting intake: [ ] none [ ] nausea  [ ] vomiting [ ] diarrhea [ ] constipation  [ ]chewing problems [x ] swallowing issues  [ ] other: puree moderately thick per speech    Diet Prescription: Diet, Pureed:   Moderately Thick Liquids (MODTHICKLIQS) (11-25-22 @ 10:03)    Intake: poor today     Current Weight: Weight (kg): 61.2 (11-23 @ 01:11) left and right foot + 1 edema      Pertinent Medications: MEDICATIONS  (STANDING):  dextrose 5%. 1000 milliLiter(s) (100 mL/Hr) IV Continuous <Continuous>  dextrose 5%. 1000 milliLiter(s) (50 mL/Hr) IV Continuous <Continuous>  dextrose 50% Injectable 25 Gram(s) IV Push once  dextrose 50% Injectable 12.5 Gram(s) IV Push once  dextrose 50% Injectable 25 Gram(s) IV Push once  diltiazem    milliGRAM(s) Oral daily  gabapentin 400 milliGRAM(s) Oral three times a day  glucagon  Injectable 1 milliGRAM(s) IntraMuscular once  insulin lispro (ADMELOG) corrective regimen sliding scale   SubCutaneous every 6 hours  latanoprost 0.005% Ophthalmic Solution 1 Drop(s) Both EYES at bedtime  methylphenidate 20 milliGRAM(s) Oral daily  methylphenidate 10 milliGRAM(s) Oral daily  piperacillin/tazobactam IVPB.. 3.375 Gram(s) IV Intermittent every 8 hours    MEDICATIONS  (PRN):  acetaminophen  Suppository .. 650 milliGRAM(s) Rectal every 6 hours PRN Temp greater or equal to 38C (100.4F)  aluminum hydroxide/magnesium hydroxide/simethicone Suspension 30 milliLiter(s) Oral every 4 hours PRN Dyspepsia  dextrose Oral Gel 15 Gram(s) Oral once PRN Blood Glucose LESS THAN 70 milliGRAM(s)/deciliter  ondansetron Injectable 4 milliGRAM(s) IV Push every 8 hours PRN Nausea and/or Vomiting    Pertinent Labs: K 3.1 Phos 1.9 Na 148 on IV fluids  Skin: no pressure injury     Estimated Needs:   [x ] no change since previous assessment based on 135# 1530-1836kcals 25-30kcals/kg and 61-73gms protein 1-1.2gms/kg  [ ] recalculated:     Previous Nutrition Diagnosis:   [x ] swallow difficulty [x ] Malnutrition     Nutrition Diagnosis is [x ] ongoing  [ ] resolved [ ] not applicable     New Nutrition Diagnosis: [x ] not applicable       Interventions:   Recommend  [ ] Change Diet To:  [ ] Nutrition Supplement  [ ] Nutrition Support  [x ] Other: recommend ensure pudding TID, follow blood sugars, trend K and Phos levels     Monitoring and Evaluation:   [x ] PO intake [ x ] Tolerance to diet prescription [ x ] weights [ x ] labs[ x ] follow up per protocol  [ ] other:

## 2022-11-27 NOTE — PROGRESS NOTE ADULT - PROBLEM SELECTOR PLAN 3
Pt admitted from 10/19 to 10/22/2022 at [facility] for SDH s/p s/p andexxa  Off of AC.   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  Mild mass effect with sulcal effacement and partial compression of the  left lateral ventricle. No new acute intracranial hemorrhage.  - Neuro Dr. Main, recs appreciated  Plan for Transfer to [facility] for further Eval & EEG

## 2022-11-27 NOTE — SWALLOW BEDSIDE ASSESSMENT ADULT - ADDITIONAL RECOMMENDATIONS
1) Speech to follow to check PO tolerance, reassess swallow and MBS candidacy at bedside.  2) Speech tx upon discharge   3) Consider GOC conversation with pt/family regarding nutrition/hydration.
This department will continue to follow the patient for diet tolerance/advancement, as schedule permits.

## 2022-11-27 NOTE — PROGRESS NOTE ADULT - ATTENDING COMMENTS
72 M BPH CAD, HTN, HLD, Afib, SDH s/p mechanical fall, recurrent ICH when on AC brought today for weakness. History obtained from pt's wife Love and chart review given patient medical condition. Pt admitted from 10/19 to 10/22/2022 at [facility] for SHD s/p adexxa. Pt's wife endorses that patient has been progressively deteriorating, states that now only is reactive to painful stimulus, prior used to articulate some words. Patient admitted for acute on chronic AMS.  Pt seen, examined, case & care plan d/w pt, residents at [facility].  Neurology-DR Main follow up -Pt for Transfer to [facility] for Neuro surgical Eval DR GONZALEZ   -ID DR giles-continue IV Zosyn q 8 hrs   -GI-DR Kaye -NO PEG , S & S --->Moderately Thicketed liquid with  Pureed diet, MBS in AM   Palliative Care Eval -DNR/DNI   D/W Wife at [facility]   - aspiration Precaution.  SCD   PT/OT  Eval   Social service Eval.  Total care time 45 minutes.   Transfer pt to [facility] when bed available. wife aware. 72 M BPH CAD, HTN, HLD, Afib, SDH s/p mechanical fall, recurrent ICH when on AC brought today for weakness. History obtained from pt's wife Love and chart review given patient medical condition. Pt admitted from 10/19 to 10/22/2022 at [facility] for SHD s/p adexxa. Pt's wife endorses that patient has been progressively deteriorating, states that now only is reactive to painful stimulus, prior used to articulate some words. Patient admitted for acute on chronic AMS.  Pt seen, examined, case & care plan d/w pt, residents at [facility].  Neurology-DR Main follow up -Pt for Transfer to [facility] for Neuro surgical Eval DR GONZALEZ   -ID DR giles-continue IV Zosyn q 8 hrs   -GI-DR Kaye -NO PEG , S & S --->Moderately Thicketed liquid with  Pureed diet, MBS in AM   Palliative Care Eval -DNR/DNI .  D/W Wife at [facility]   - aspiration Precaution.  SCD   PT/OT  Eval   Social service Eval.  Total care time 45 minutes.   Transfer pt to [facility] when bed available. wife aware.

## 2022-11-27 NOTE — SWALLOW BEDSIDE ASSESSMENT ADULT - SWALLOW EVAL: RECOMMENDED FEEDING/EATING TECHNIQUES
allow for swallow between intakes/alternate food with liquid/check mouth frequently for oral residue/pocketing/crush medication (when feasible)/maintain upright posture during/after eating for 30 mins/no straws/oral hygiene/position upright (90 degrees)/small sips/bites
Feed only when awake and alert;  Ensure pt swallowed prior to presentation of next bolus/allow for swallow between intakes/check mouth frequently for oral residue/pocketing/crush medication (when feasible)/maintain upright posture during/after eating for 30 mins/oral hygiene/position upright (90 degrees)/small sips/bites

## 2022-11-27 NOTE — SWALLOW BEDSIDE ASSESSMENT ADULT - COMMENTS
Chart reviewed. Patient is familiar to this department and was seen last seen for bedside swallow assessment on 11/25/22, at which time a pureed and moderately thick liquid diet level via teaspoon only was recommended (see report for details).    Per Hospitalist note 11/27/22: " INTERVAL HPI:11/24/22: pt seen, examined , Pt had fever 103.8 & 101 early this AM , Afebrile Now, ON IV fluids, IV Bolus given, Leukocytosis ,Started on IV Abx Zosyn, wife at bedside, ID  Dr Boggs called , Hypokalemia - IV K replaced. Remeron d/c.11/25/22: Patient seen and examined at bedside. Pt following some simple commands (move hands, squeeze finger), otherwise unresponsive to speech and unable to cooperate in AM. Reevaluated x2 in AM by speech, started on moderately thick liquids. WBC improving, replace K  11/26/22: Patient seen and examined at bedside. Pt asleep during exam. Did not open eyes but nodded "yes" when I said his name. Potassium repleted. Wbc continues to improve. Hb dropped from 11.4 to 105. Plan to transfer to [facility] for further management. On IV Abx, WBC Improving  11/26/22: Patient seen and examined at bedside. Pt somewhat cooperative with exam. ROS unreliable. COVID negative. Plan to transfer to [facility]. WBC wnl."    Discussed POC with Dr. Bhardwaj's resident ([phone]) who reported although patient pending transfer to [facility], swallow re-assessment for diet tolerance/potential upgraded is warranted at this time. Patient was seen at bedside this PM, at which time he was alert, oriented x1, cooperative, and denied pain. Patient demonstrated ability to follow low level commands with max cues. Vocal quality WFL.     WBC WFL. No New CXR administered.     Discussed results and recommendations with the patient/family, RN, Dietary, and Dr. Bhardwaj's resident ([phone]).
Chart reviewed pt seen to reattempt swallow eval.  Pt received awake in bed +O2NC, pt with poor command following, followed 1, 1 step directive, reduced cognition, no verbalizations, pain scale 0/10 via non verbal pain scale.  Pt presented with dry utensil, however, pt became orally defensive,  pursing lips and turning head away from utensil, consistent with 11/23/22 eval.  Suspect reduced cognition and possible behavioral overlay further impacting participation.  Due to current presentation, swallow eval unable to be completed at this time.  Pt left as received NAD VERNON Osei & Resident ([id number]) notified, Rx pt may benefit from family present to encourage participation, Resident to discuss with Dr. Bhardwaj.  Will follow x1 to reattempt swallow eval as appropriate.
Chart reviewed order received for swallow eval.  Pt received sleeping in bed on RA, arousable with cues, however, lethargy noted, pt with absent command execution, reduced cognition, pain scale 0/10 via non verbal pain scale.  Pt presented with dry utensil, however, pt became orally defensive,  pursing lips and turning head away from utensil.   Pt currently not appropriate for PO trials/swallow eval at this time.  Pt left as received NAD VERNON Lombardi & Resident ([id number]) notified.  Will follow to reattempt swallow eval as appropriate.
Chart reviewed reattempted swallow eval this morning with wife present. Pt received in bed A&A Ox0, positioned upright for eval, reduced cognition, reduced command following, +O2NC, wife present which improved pt participation and PO acceptance, intermittent verbalization of "Ow and No", pain scale 0/10 pre & post eval via non verbal pain scale.  Swallow eval completed see below for details.  Pt's wife educated regarding current presentation, rx's and speech tx plan, wife verbalized understanding.  Pt left as received NAD VERNON Morton & Dr. Bhardwaj's resident ([id number]) notified.  Will follow to check PO tolerance, reassess swallow and MBS candidacy at bedside.    Per charting, pt is a "72 M BPH CAD, HTN, HLD, Afib, SDH s/p mechanical fall, recurrent ICH when on AC brought today for weakness. History obtained from pt's wife Love and chart review given patient medical condition. Pt admitted from 10/19 to 10/22/2022 at [facility] for SHD s/p adexxa. Pt's wife endorses that patient has been progressively deteriorating, states that now only is reactive to painful stimulus, prior used to articulate some words. Patient admitted for acute on chronic AMS."    Chest xray 11/24/22: "IMPRESSION: Right-sided infiltrate presently seen."    WBC 11/24/22: "25.02"

## 2022-11-27 NOTE — PROGRESS NOTE ADULT - PROBLEM SELECTOR PLAN 5
Chronic  - Meds: Hydralazine 50 mg q8h and losartan 50 q12h  - Continue holding home BP meds as pt is within target BP range

## 2022-11-27 NOTE — SWALLOW BEDSIDE ASSESSMENT ADULT - ASR SWALLOW RECOMMEND DIAG
Will follow to check PO tolerance and reassess at bedside, as well as, reassess candidacy for MBS when pt medically optimized and participating in assessments consistently
to assess safest diet level and r/o aspiration/VFSS/MBS

## 2022-11-27 NOTE — PROGRESS NOTE ADULT - SUBJECTIVE AND OBJECTIVE BOX
Patient is a 72y old  Male who presents with a chief complaint of acute on chronic AMS (26 Nov 2022 15:07)    HPI:  72M BPH CAD, HTN, HLD, Afib, SDH s/p mechanical fall, recurrent ICH when on AC brought today for weakness. History obtained from pt's wife Love and chart review given patient medical condition. Pt admitted from 10/19 to 10/22/2022 at [facility] for SHD s/p adexxa. Pt's wife endorses that patient has been progressively deteriorating, states that now only is reactive to painful stimulus, prior used to articulate some words. Patient completed 2 weeks of outpatient PT, has 8-10 hours nurse aid daily. Temp at home 99.9. Wife assist with feeding, was seen by neuro who start mirtazapine and sent for neurologic work up.     ED course  VS: Afebrile, HR: 96, BP: 154/97, SpO2: 97% RA RR 19  Labs significant for glucose 227, Alkaline phosphate 227, UA shows glucose 250  CT head showed chronic subdural hematomas overlying the left side of the posterior falx measuring up to 1.7 cm and 1.5 cm overlying the left temporal convexity.  Mild mass effect with sulcal effacement and partial compression of the  left lateral ventricle. No new acute intracranial hemorrhage.  EKG shows Afib with RVR @ 110 bpm, Right axis deviation    (23 Nov 2022 01:11)    INTERVAL HPI:  11/24/22: pt seen, examined , Pt had fever 103.8 & 101 early this AM , Afebrile Now, ON IV fluids, IV Bolus given, Leukocytosis ,Started on IV Abx Zosyn, wife at bedside, ID  Dr Boggs called , Hypokalemia - IV K replaced. Remeron d/c.    11/25/22: Patient seen and examined at bedside. Pt following some simple commands (move hands, squeeze finger), otherwise unresponsive to speech and unable to cooperate in AM. Reevaluated x2 in AM by speech, started on moderately thick liquids. WBC improving, replace K     11/26/22: Patient seen and examined at bedside. Pt asleep during exam. Did not open eyes but nodded "yes" when I said his name. Potassium repleted. Wbc continues to improve. Hb dropped from 11.4 to 105. Plan to transfer to [facility] for further management. On IV Abx, WBC Improving     11/26/22: Patient seen and examined at bedside. Pt somewhat cooperative with exam. ROS unreliable. COVID negative. Plan to transfer to [facility]. WBC wnl.    OVERNIGHT EVENTS: NONE    Home Medications:  Dextrose 5% with 0.45% NaCl and KCl 20 mEq/L intravenous solution: 1000 milliliter(s) intravenous every 24 hours (26 Nov 2022 17:56)  gabapentin 400 mg oral capsule: 1 cap(s) orally 3 times a day (23 Nov 2022 02:49)  hydrALAZINE 25 mg oral tablet: 2 tab(s) orally every 8 hours (23 Nov 2022 02:49)  latanoprost 0.005% ophthalmic solution: 1 drop(s) to each affected eye once a day (in the evening) (23 Nov 2022 02:49)  methylphenidate 10 mg oral tablet: 2 pills at 8AM; 1 pill at 1PM (23 Nov 2022 02:49)  mirtazapine 7.5 mg oral tablet: 1 tab(s) orally once a day (at bedtime) (23 Nov 2022 02:49)  piperacillin-tazobactam: 3.375 gram(s) in dextrose 5% 100mL intravenous every 8 hours (26 Nov 2022 13:03)  rosuvastatin 10 mg oral tablet: 1 tab(s) orally once a day (23 Nov 2022 02:49)      MEDICATIONS  (STANDING):  dextrose 5% + sodium chloride 0.45% with potassium chloride 20 mEq/L 1000 milliLiter(s) (80 mL/Hr) IV Continuous <Continuous>  dextrose 5%. 1000 milliLiter(s) (100 mL/Hr) IV Continuous <Continuous>  dextrose 5%. 1000 milliLiter(s) (50 mL/Hr) IV Continuous <Continuous>  dextrose 50% Injectable 25 Gram(s) IV Push once  dextrose 50% Injectable 12.5 Gram(s) IV Push once  dextrose 50% Injectable 25 Gram(s) IV Push once  diltiazem    milliGRAM(s) Oral daily  gabapentin 400 milliGRAM(s) Oral three times a day  glucagon  Injectable 1 milliGRAM(s) IntraMuscular once  insulin lispro (ADMELOG) corrective regimen sliding scale   SubCutaneous every 6 hours  latanoprost 0.005% Ophthalmic Solution 1 Drop(s) Both EYES at bedtime  methylphenidate 20 milliGRAM(s) Oral daily  methylphenidate 10 milliGRAM(s) Oral daily  piperacillin/tazobactam IVPB.. 3.375 Gram(s) IV Intermittent every 8 hours    MEDICATIONS  (PRN):  acetaminophen  Suppository .. 650 milliGRAM(s) Rectal every 6 hours PRN Temp greater or equal to 38C (100.4F)  aluminum hydroxide/magnesium hydroxide/simethicone Suspension 30 milliLiter(s) Oral every 4 hours PRN Dyspepsia  dextrose Oral Gel 15 Gram(s) Oral once PRN Blood Glucose LESS THAN 70 milliGRAM(s)/deciliter  ondansetron Injectable 4 milliGRAM(s) IV Push every 8 hours PRN Nausea and/or Vomiting      Allergies    No Known Allergies    Intolerances        Social History:  Tobacco: denies   EtOH: denies   Recreational drug use: denies   Lives with: wife   Ambulates: bedbound, has a nurse aid 8-10 daily   Vaccinations: Fully COVID-19 vaccinated (23 Nov 2022 01:11)    REVIEW OF SYSTEMS:  unable to accurately obtain due to patient condition.    Vital Signs Last 24 Hrs  T(C): 37 (27 Nov 2022 04:51), Max: 37 (27 Nov 2022 04:51)  T(F): 98.6 (27 Nov 2022 04:51), Max: 98.6 (27 Nov 2022 04:51)  HR: 97 (27 Nov 2022 06:11) (83 - 104)  BP: 134/85 (27 Nov 2022 06:11) (128/87 - 138/84)  BP(mean): --  RR: 18 (27 Nov 2022 04:51) (18 - 18)  SpO2: 92% (27 Nov 2022 04:51) (92% - 97%)    Parameters below as of 27 Nov 2022 04:51  Patient On (Oxygen Delivery Method): nasal cannula      Finger Stick        11-26 @ 07:01  -  11-27 @ 07:00  --------------------------------------------------------  IN: 960 mL / OUT: 0 mL / NET: 960 mL        PHYSICAL EXAM: awake, smiles , ~non verbal  GENERAL:  [ x ] NAD , [ x ] well appearing, [  ] Agitated, [x  ] Drowsy,  [x  ] Lethargy, [  ] confused   HEAD:  [ x ] Normal, [  ] Other  EYES:  [x  ] EOMI, [ x ] PERRLA, [x  ] conjunctiva and sclera clear normal, [  ] Other,  [ x ] Pallor,[  ] Discharge  ENMT:  [ x ] Normal, [x  ] Dry  mucous membranes, [ x ] Good dentition, [ x ] No Thrush  NECK:  [ x ] Supple, [ x ] No JVD, [ x ] Normal thyroid, [  ] Lymphadenopathy [  ] Other  CHEST/LUNG:  [ x ] Clear to auscultation bilaterally, [ x ] Breath Sounds equal B/L / Decrease, [x  ] poor effort  [ x ] No rales, [ x ] No rhonchi  [x  ]  No wheezing,   HEART:  [ x ] Regular rate and rhythm, [  ] tachycardia, [  ] Bradycardia,  [  ] irregular  [ x ] No murmurs, No rubs, No gallops, [  ] PPM in place (Mfr:  )  ABDOMEN: [ x ] Soft, [ x ] Nondistended, [ x ] No mass, [x  ] Bowel sounds present, [x  ] obese  NERVOUS SYSTEM:  [  ] Alert & Oriented x , [ x ] Nonfocal  [  ] Confusion  [  ] Encephalopathic [  ] Sedated [x  ] Unable to assess- asleep during exam, did not wake up [  ] Dementia   EXTREMITIES: [ x ] No clubbing, No cyanosis,  [  ] edema B/L lower EXT. [ x ] PVD stasis skin changes B/L Lower EXT, [  ] wound + Lower Ext pain -  LYMPH: No lymphadenopathy noted  SKIN:  [  ] No rashes or lesions, [  ] Pressure Ulcers, [  ] ecchymosis, [  ] Skin Tears, [x  ] Other- dry skin, with Scabs all EXT    LABS:                        10.4   10.23 )-----------( 168      ( 27 Nov 2022 10:10 )             31.5     27 Nov 2022 10:10    140    |  113    |  7      ----------------------------<  183    4.2     |  21     |  0.79     Ca    8.0        27 Nov 2022 10:10  Phos  1.8       27 Nov 2022 10:10  Mg     1.7       27 Nov 2022 10:10    TPro  5.3    /  Alb  1.9    /  TBili  0.6    /  DBili  x      /  AST  36     /  ALT  44     /  AlkPhos  93     27 Nov 2022 10:10          Culture Results:   >100,000 CFU/ml Klebsiella pneumoniae (11-24 @ 08:26)  Culture Results:   No growth to date. (11-24 @ 06:40)  Culture Results:   No growth to date. (11-24 @ 06:37)  Culture Results:   No growth to date. (11-22 @ 22:10)  Culture Results:   <10,000 CFU/mL Normal Urogenital Angle (11-22 @ 22:10)  Culture Results:   No growth to date. (11-22 @ 22:05)                  Culture - Urine (collected 24 Nov 2022 08:26)  Source: Catheterized Catheterized  Preliminary Report (26 Nov 2022 17:09):    >100,000 CFU/ml Klebsiella pneumoniae    Culture - Blood (collected 24 Nov 2022 06:40)  Source: .Blood Blood-Peripheral  Preliminary Report (25 Nov 2022 14:01):    No growth to date.    Culture - Blood (collected 24 Nov 2022 06:37)  Source: .Blood Blood-Peripheral  Preliminary Report (25 Nov 2022 14:01):    No growth to date.    Culture - Urine (collected 22 Nov 2022 22:10)  Source: Catheterized Catheterized  Final Report (24 Nov 2022 07:27):    <10,000 CFU/mL Normal Urogenital Angle    Culture - Blood (collected 22 Nov 2022 22:10)  Source: .Blood Blood-Venous  Preliminary Report (24 Nov 2022 02:03):    No growth to date.    Culture - Blood (collected 22 Nov 2022 22:05)  Source: .Blood Blood-Venous  Preliminary Report (24 Nov 2022 02:03):    No growth to date.         Anemia Panel:  Vitamin B12, Serum: 656 pg/mL (11-24-22 @ 06:37)      Thyroid Panel:                RADIOLOGY & ADDITIONAL TESTS:      HEALTH ISSUES - PROBLEM Dx:  AMS (altered mental status)    HTN (hypertension)  You have a known history of hypertension, the medical term for high blood pressure. Untreated high blood pressure increases the strain on the heart and arteries, eventually causing organ damage. High blood pressure also increases the risk of heart failure, heart attack (myocardial infarction), stroke, and kidney failure.   - Continue to take your home medications to prevent the possible complications of uncontrolled hypertension.   - Please also follow up with your primary care physician on a regular basis to make sure your blood pressure continues to be well controlled.      Motor system disease    Peripheral neuropathy    History of diabetes mellitus    Major depression    Need for prophylactic measure    Fever    Dysphagia        Consultant(s) Notes Reviewed:  [x  ] YES     Care Discussed with [X] Consultants  [ x ] Patient  [  ] Family [  ] HCP [  ]   [  ] Social Service  [x  ] RN, [  ] Physical Therapy,[  ] Palliative care team  DVT PPX: [  ] Lovenox, [  ] S C Heparin, [  ] Coumadin, [  ] Xarelto, [  ] Eliquis, [  ] Pradaxa, [  ] IV Heparin drip, [  ] SCD [  ] Contraindication 2 to GI Bleed,[  ] Ambulation [  ] Contraindicated 2 to  bleed [x] Contraindicated 2 to Brain Bleed  Advanced directive: [  ] None, [ x ] DNR/DNI Patient is a 72y old  Male who presents with a chief complaint of acute on chronic AMS (26 Nov 2022 15:07)    HPI:  72M BPH CAD, HTN, HLD, Afib, SDH s/p mechanical fall, recurrent ICH when on AC brought today for weakness. History obtained from pt's wife Love and chart review given patient medical condition. Pt admitted from 10/19 to 10/22/2022 at [facility] for SHD s/p adexxa. Pt's wife endorses that patient has been progressively deteriorating, states that now only is reactive to painful stimulus, prior used to articulate some words. Patient completed 2 weeks of outpatient PT, has 8-10 hours nurse aid daily. Temp at home 99.9. Wife assist with feeding, was seen by neuro who start mirtazapine and sent for neurologic work up.     ED course  VS: Afebrile, HR: 96, BP: 154/97, SpO2: 97% RA RR 19  Labs significant for glucose 227, Alkaline phosphate 227, UA shows glucose 250  CT head showed chronic subdural hematomas overlying the left side of the posterior falx measuring up to 1.7 cm and 1.5 cm overlying the left temporal convexity.  Mild mass effect with sulcal effacement and partial compression of the  left lateral ventricle. No new acute intracranial hemorrhage.  EKG shows Afib with RVR @ 110 bpm, Right axis deviation    (23 Nov 2022 01:11)    INTERVAL HPI:  11/24/22: pt seen, examined , Pt had fever 103.8 & 101 early this AM , Afebrile Now, ON IV fluids, IV Bolus given, Leukocytosis ,Started on IV Abx Zosyn, wife at bedside, ID  Dr Boggs called , Hypokalemia - IV K replaced. Remeron d/c.    11/25/22: Patient seen and examined at bedside. Pt following some simple commands (move hands, squeeze finger), otherwise unresponsive to speech and unable to cooperate in AM. Reevaluated x2 in AM by speech, started on moderately thick liquids. WBC improving, replace K     11/26/22: Patient seen and examined at bedside. Pt asleep during exam. Did not open eyes but nodded "yes" when I said his name. Potassium repleted. Wbc continues to improve. Hb dropped from 11.4 to 105. Plan to transfer to [facility] for further management. On IV Abx, WBC Improving     11/26/22: Patient seen and examined at bedside. Pt somewhat cooperative with exam. ROS unreliable. COVID negative. Plan to transfer to [facility]. WBC wnl.    OVERNIGHT EVENTS: NONE    Home Medications:  Dextrose 5% with 0.45% NaCl and KCl 20 mEq/L intravenous solution: 1000 milliliter(s) intravenous every 24 hours (26 Nov 2022 17:56)  gabapentin 400 mg oral capsule: 1 cap(s) orally 3 times a day (23 Nov 2022 02:49)  hydrALAZINE 25 mg oral tablet: 2 tab(s) orally every 8 hours (23 Nov 2022 02:49)  latanoprost 0.005% ophthalmic solution: 1 drop(s) to each affected eye once a day (in the evening) (23 Nov 2022 02:49)  methylphenidate 10 mg oral tablet: 2 pills at 8AM; 1 pill at 1PM (23 Nov 2022 02:49)  mirtazapine 7.5 mg oral tablet: 1 tab(s) orally once a day (at bedtime) (23 Nov 2022 02:49)  piperacillin-tazobactam: 3.375 gram(s) in dextrose 5% 100mL intravenous every 8 hours (26 Nov 2022 13:03)  rosuvastatin 10 mg oral tablet: 1 tab(s) orally once a day (23 Nov 2022 02:49)      MEDICATIONS  (STANDING):  dextrose 5% + sodium chloride 0.45% with potassium chloride 20 mEq/L 1000 milliLiter(s) (80 mL/Hr) IV Continuous <Continuous>  dextrose 5%. 1000 milliLiter(s) (100 mL/Hr) IV Continuous <Continuous>  dextrose 5%. 1000 milliLiter(s) (50 mL/Hr) IV Continuous <Continuous>  dextrose 50% Injectable 25 Gram(s) IV Push once  dextrose 50% Injectable 12.5 Gram(s) IV Push once  dextrose 50% Injectable 25 Gram(s) IV Push once  diltiazem    milliGRAM(s) Oral daily  gabapentin 400 milliGRAM(s) Oral three times a day  glucagon  Injectable 1 milliGRAM(s) IntraMuscular once  insulin lispro (ADMELOG) corrective regimen sliding scale   SubCutaneous every 6 hours  latanoprost 0.005% Ophthalmic Solution 1 Drop(s) Both EYES at bedtime  methylphenidate 20 milliGRAM(s) Oral daily  methylphenidate 10 milliGRAM(s) Oral daily  piperacillin/tazobactam IVPB.. 3.375 Gram(s) IV Intermittent every 8 hours    MEDICATIONS  (PRN):  acetaminophen  Suppository .. 650 milliGRAM(s) Rectal every 6 hours PRN Temp greater or equal to 38C (100.4F)  aluminum hydroxide/magnesium hydroxide/simethicone Suspension 30 milliLiter(s) Oral every 4 hours PRN Dyspepsia  dextrose Oral Gel 15 Gram(s) Oral once PRN Blood Glucose LESS THAN 70 milliGRAM(s)/deciliter  ondansetron Injectable 4 milliGRAM(s) IV Push every 8 hours PRN Nausea and/or Vomiting      Allergies    No Known Allergies    Intolerances        Social History:  Tobacco: denies   EtOH: denies   Recreational drug use: denies   Lives with: wife   Ambulates: bedbound, has a nurse aid 8-10 daily   Vaccinations: Fully COVID-19 vaccinated (23 Nov 2022 01:11)    REVIEW OF SYSTEMS:  unable to accurately obtain due to patient condition.    Vital Signs Last 24 Hrs  T(C): 37 (27 Nov 2022 04:51), Max: 37 (27 Nov 2022 04:51)  T(F): 98.6 (27 Nov 2022 04:51), Max: 98.6 (27 Nov 2022 04:51)  HR: 97 (27 Nov 2022 06:11) (83 - 104)  BP: 134/85 (27 Nov 2022 06:11) (128/87 - 138/84)  BP(mean): --  RR: 18 (27 Nov 2022 04:51) (18 - 18)  SpO2: 92% (27 Nov 2022 04:51) (92% - 97%)    Parameters below as of 27 Nov 2022 04:51  Patient On (Oxygen Delivery Method): nasal cannula      Finger Stick        11-26 @ 07:01  -  11-27 @ 07:00  --------------------------------------------------------  IN: 960 mL / OUT: 0 mL / NET: 960 mL        PHYSICAL EXAM: awake, smiles , ~non verbal  GENERAL:  [ x ] NAD , [ x ] well appearing, [  ] Agitated, [x  ] Drowsy,  [x  ] Lethargy, [  ] confused   HEAD:  [ x ] Normal, [  ] Other  EYES:  Unable to examine, pt is uncooperative on exam  ENMT:  [ x ] Normal, [x  ] Dry  mucous membranes, [ x ] Good dentition, [ x ] No Thrush  NECK:  [ x ] Supple, [ x ] No JVD, [ x ] Normal thyroid, [  ] Lymphadenopathy [  ] Other  CHEST/LUNG:  [ x ] Clear to auscultation bilaterally, [ x ] Breath Sounds equal B/L / Decrease, [x  ] poor effort  [ x ] No rales, [ x ] No rhonchi  [x  ]  No wheezing,   HEART:  [  ] Regular rate and rhythm, [X] tachycardia, [  ] Bradycardia,  [  ] irregular  [ x ] No murmurs, No rubs, No gallops, [  ] PPM in place (Mfr:  )  ABDOMEN: [ x ] Soft, [ x ] Nondistended, [ x ] No mass, [x  ] Bowel sounds present, [x  ] obese  NERVOUS SYSTEM:  [  ] Alert & Oriented x , [ x ] Nonfocal  [  ] Confusion  [  ] Encephalopathic [  ] Sedated [x  ] Unable to assess- asleep during exam, did not wake up [  ] Dementia   EXTREMITIES: [ x ] No clubbing, No cyanosis,  [  ] edema B/L lower EXT. [ x ] PVD stasis skin changes B/L Lower EXT, [  ] wound + Lower Ext pain -  LYMPH: No lymphadenopathy noted  SKIN:  [  ] No rashes or lesions, [  ] Pressure Ulcers, [  ] ecchymosis, [  ] Skin Tears, [x  ] Other- dry skin, with Scabs all EXT    LABS:                        10.4   10.23 )-----------( 168      ( 27 Nov 2022 10:10 )             31.5     27 Nov 2022 10:10    140    |  113    |  7      ----------------------------<  183    4.2     |  21     |  0.79     Ca    8.0        27 Nov 2022 10:10  Phos  1.8       27 Nov 2022 10:10  Mg     1.7       27 Nov 2022 10:10    TPro  5.3    /  Alb  1.9    /  TBili  0.6    /  DBili  x      /  AST  36     /  ALT  44     /  AlkPhos  93     27 Nov 2022 10:10          Culture Results:   >100,000 CFU/ml Klebsiella pneumoniae (11-24 @ 08:26)  Culture Results:   No growth to date. (11-24 @ 06:40)  Culture Results:   No growth to date. (11-24 @ 06:37)  Culture Results:   No growth to date. (11-22 @ 22:10)  Culture Results:   <10,000 CFU/mL Normal Urogenital Angle (11-22 @ 22:10)  Culture Results:   No growth to date. (11-22 @ 22:05)                  Culture - Urine (collected 24 Nov 2022 08:26)  Source: Catheterized Catheterized  Preliminary Report (26 Nov 2022 17:09):    >100,000 CFU/ml Klebsiella pneumoniae    Culture - Blood (collected 24 Nov 2022 06:40)  Source: .Blood Blood-Peripheral  Preliminary Report (25 Nov 2022 14:01):    No growth to date.    Culture - Blood (collected 24 Nov 2022 06:37)  Source: .Blood Blood-Peripheral  Preliminary Report (25 Nov 2022 14:01):    No growth to date.    Culture - Urine (collected 22 Nov 2022 22:10)  Source: Catheterized Catheterized  Final Report (24 Nov 2022 07:27):    <10,000 CFU/mL Normal Urogenital Angle    Culture - Blood (collected 22 Nov 2022 22:10)  Source: .Blood Blood-Venous  Preliminary Report (24 Nov 2022 02:03):    No growth to date.    Culture - Blood (collected 22 Nov 2022 22:05)  Source: .Blood Blood-Venous  Preliminary Report (24 Nov 2022 02:03):    No growth to date.         Anemia Panel:  Vitamin B12, Serum: 656 pg/mL (11-24-22 @ 06:37)      Thyroid Panel:                RADIOLOGY & ADDITIONAL TESTS:      HEALTH ISSUES - PROBLEM Dx:  AMS (altered mental status)    HTN (hypertension)  You have a known history of hypertension, the medical term for high blood pressure. Untreated high blood pressure increases the strain on the heart and arteries, eventually causing organ damage. High blood pressure also increases the risk of heart failure, heart attack (myocardial infarction), stroke, and kidney failure.   - Continue to take your home medications to prevent the possible complications of uncontrolled hypertension.   - Please also follow up with your primary care physician on a regular basis to make sure your blood pressure continues to be well controlled.      Motor system disease    Peripheral neuropathy    History of diabetes mellitus    Major depression    Need for prophylactic measure    Fever    Dysphagia        Consultant(s) Notes Reviewed:  [x  ] YES     Care Discussed with [X] Consultants  [ x ] Patient  [  ] Family [  ] HCP [  ]   [  ] Social Service  [x  ] RN, [  ] Physical Therapy,[  ] Palliative care team  DVT PPX: [  ] Lovenox, [  ] S C Heparin, [  ] Coumadin, [  ] Xarelto, [  ] Eliquis, [  ] Pradaxa, [  ] IV Heparin drip, [  ] SCD [  ] Contraindication 2 to GI Bleed,[  ] Ambulation [  ] Contraindicated 2 to  bleed [x] Contraindicated 2 to Brain Bleed  Advanced directive: [  ] None, [ x ] DNR/DNI Patient is a 72y old  Male who presents with a chief complaint of acute on chronic AMS (26 Nov 2022 15:07)    HPI:  72M BPH CAD, HTN, HLD, Afib, SDH s/p mechanical fall, recurrent ICH when on AC brought today for weakness. History obtained from pt's wife Love and chart review given patient medical condition. Pt admitted from 10/19 to 10/22/2022 at [facility] for SHD s/p adexxa. Pt's wife endorses that patient has been progressively deteriorating, states that now only is reactive to painful stimulus, prior used to articulate some words. Patient completed 2 weeks of outpatient PT, has 8-10 hours nurse aid daily. Temp at home 99.9. Wife assist with feeding, was seen by neuro who start mirtazapine and sent for neurologic work up.     ED course  VS: Afebrile, HR: 96, BP: 154/97, SpO2: 97% RA RR 19  Labs significant for glucose 227, Alkaline phosphate 227, UA shows glucose 250  CT head showed chronic subdural hematomas overlying the left side of the posterior falx measuring up to 1.7 cm and 1.5 cm overlying the left temporal convexity.  Mild mass effect with sulcal effacement and partial compression of the  left lateral ventricle. No new acute intracranial hemorrhage.  EKG shows Afib with RVR @ 110 bpm, Right axis deviation    (23 Nov 2022 01:11)    INTERVAL HPI:  11/24/22: pt seen, examined , Pt had fever 103.8 & 101 early this AM , Afebrile Now, ON IV fluids, IV Bolus given, Leukocytosis ,Started on IV Abx Zosyn, wife at bedside, ID  Dr Boggs called , Hypokalemia - IV K replaced. Remeron d/c.    11/25/22: Patient seen and examined at bedside. Pt following some simple commands (move hands, squeeze finger), otherwise unresponsive to speech and unable to cooperate in AM. Reevaluated x2 in AM by speech, started on moderately thick liquids. WBC improving, replace K     11/26/22: Patient seen and examined at bedside. Pt asleep during exam. Did not open eyes but nodded "yes" when I said his name. Potassium repleted. Wbc continues to improve. Hb dropped from 11.4 to 105. Plan to transfer to [facility] for further management. On IV Abx, WBC Improving     11/26/22: Patient seen and examined at bedside. Pt somewhat cooperative with exam. ROS unreliable. COVID negative. Plan to transfer to [facility]. WBC wnl.    OVERNIGHT EVENTS: NONE    Home Medications:  Dextrose 5% with 0.45% NaCl and KCl 20 mEq/L intravenous solution: 1000 milliliter(s) intravenous every 24 hours (26 Nov 2022 17:56)  gabapentin 400 mg oral capsule: 1 cap(s) orally 3 times a day (23 Nov 2022 02:49)  hydrALAZINE 25 mg oral tablet: 2 tab(s) orally every 8 hours (23 Nov 2022 02:49)  latanoprost 0.005% ophthalmic solution: 1 drop(s) to each affected eye once a day (in the evening) (23 Nov 2022 02:49)  methylphenidate 10 mg oral tablet: 2 pills at 8AM; 1 pill at 1PM (23 Nov 2022 02:49)  mirtazapine 7.5 mg oral tablet: 1 tab(s) orally once a day (at bedtime) (23 Nov 2022 02:49)  piperacillin-tazobactam: 3.375 gram(s) in dextrose 5% 100mL intravenous every 8 hours (26 Nov 2022 13:03)  rosuvastatin 10 mg oral tablet: 1 tab(s) orally once a day (23 Nov 2022 02:49)      MEDICATIONS  (STANDING):  dextrose 5% + sodium chloride 0.45% with potassium chloride 20 mEq/L 1000 milliLiter(s) (80 mL/Hr) IV Continuous <Continuous>  dextrose 5%. 1000 milliLiter(s) (100 mL/Hr) IV Continuous <Continuous>  dextrose 5%. 1000 milliLiter(s) (50 mL/Hr) IV Continuous <Continuous>  dextrose 50% Injectable 25 Gram(s) IV Push once  dextrose 50% Injectable 12.5 Gram(s) IV Push once  dextrose 50% Injectable 25 Gram(s) IV Push once  diltiazem    milliGRAM(s) Oral daily  gabapentin 400 milliGRAM(s) Oral three times a day  glucagon  Injectable 1 milliGRAM(s) IntraMuscular once  insulin lispro (ADMELOG) corrective regimen sliding scale   SubCutaneous every 6 hours  latanoprost 0.005% Ophthalmic Solution 1 Drop(s) Both EYES at bedtime  methylphenidate 20 milliGRAM(s) Oral daily  methylphenidate 10 milliGRAM(s) Oral daily  piperacillin/tazobactam IVPB.. 3.375 Gram(s) IV Intermittent every 8 hours    MEDICATIONS  (PRN):  acetaminophen  Suppository .. 650 milliGRAM(s) Rectal every 6 hours PRN Temp greater or equal to 38C (100.4F)  aluminum hydroxide/magnesium hydroxide/simethicone Suspension 30 milliLiter(s) Oral every 4 hours PRN Dyspepsia  dextrose Oral Gel 15 Gram(s) Oral once PRN Blood Glucose LESS THAN 70 milliGRAM(s)/deciliter  ondansetron Injectable 4 milliGRAM(s) IV Push every 8 hours PRN Nausea and/or Vomiting      Allergies    No Known Allergies    Intolerances        Social History:  Tobacco: denies   EtOH: denies   Recreational drug use: denies   Lives with: wife   Ambulates: bedbound, has a nurse aid 8-10 daily   Vaccinations: Fully COVID-19 vaccinated (23 Nov 2022 01:11)    REVIEW OF SYSTEMS:  unable to accurately obtain due to patient condition.    Vital Signs Last 24 Hrs  T(C): 37 (27 Nov 2022 04:51), Max: 37 (27 Nov 2022 04:51)  T(F): 98.6 (27 Nov 2022 04:51), Max: 98.6 (27 Nov 2022 04:51)  HR: 97 (27 Nov 2022 06:11) (83 - 104)  BP: 134/85 (27 Nov 2022 06:11) (128/87 - 138/84)  BP(mean): --  RR: 18 (27 Nov 2022 04:51) (18 - 18)  SpO2: 92% (27 Nov 2022 04:51) (92% - 97%)    Parameters below as of 27 Nov 2022 04:51  Patient On (Oxygen Delivery Method): nasal cannula      Finger Stick        11-26 @ 07:01  -  11-27 @ 07:00  --------------------------------------------------------  IN: 960 mL / OUT: 0 mL / NET: 960 mL        PHYSICAL EXAM: awake, smiles , ~non verbal  GENERAL:  [ x ] NAD , [ x ] well appearing, [  ] Agitated, [x  ] Drowsy,  [x  ] Lethargy, [  ] confused   HEAD:  [ x ] Normal, [  ] Other  EYES:  Unable to examine, pt is uncooperative on exam  ENMT:  [ x ] Normal, [x  ] Dry  mucous membranes, [ x ] Good dentition, [ x ] No Thrush  NECK:  [ x ] Supple, [ x ] No JVD, [ x ] Normal thyroid, [  ] Lymphadenopathy [  ] Other  CHEST/LUNG:  [ x ] Clear to auscultation bilaterally, [ x ] Breath Sounds equal B/L / Decrease, [x  ] poor effort  [ x ] No rales, [ x ] No rhonchi  [x  ]  No wheezing,   HEART:  [ x ] Regular rate and rhythm, [X] tachycardia, [  ] Bradycardia,  [  ] irregular  [ x ] No murmurs, No rubs, No gallops, [  ] PPM in place (Mfr:  )  ABDOMEN: [ x ] Soft, [ x ] Nondistended, [ x ] No mass, [x  ] Bowel sounds present, [x  ] obese  NERVOUS SYSTEM:  [  ] Alert & Oriented x , [ x ] Nonfocal  [  ] Confusion  [  ] Encephalopathic [  ] Sedated [x  ] Unable to assess- asleep during exam, did not wake up [  ] Dementia   EXTREMITIES: [ x ] No clubbing, No cyanosis,  [  ] edema B/L lower EXT. [ x ] PVD stasis skin changes B/L Lower EXT, [  ] wound + Lower Ext pain -  LYMPH: No lymphadenopathy noted  SKIN:  [  ] No rashes or lesions, [  ] Pressure Ulcers, [  ] ecchymosis, [  ] Skin Tears, [x  ] Other- dry skin, with Scabs all EXT    LABS:                        10.4   10.23 )-----------( 168      ( 27 Nov 2022 10:10 )             31.5     27 Nov 2022 10:10    140    |  113    |  7      ----------------------------<  183    4.2     |  21     |  0.79     Ca    8.0        27 Nov 2022 10:10  Phos  1.8       27 Nov 2022 10:10  Mg     1.7       27 Nov 2022 10:10    TPro  5.3    /  Alb  1.9    /  TBili  0.6    /  DBili  x      /  AST  36     /  ALT  44     /  AlkPhos  93     27 Nov 2022 10:10          Culture Results:   >100,000 CFU/ml Klebsiella pneumoniae (11-24 @ 08:26)  Culture Results:   No growth to date. (11-24 @ 06:40)  Culture Results:   No growth to date. (11-24 @ 06:37)  Culture Results:   No growth to date. (11-22 @ 22:10)  Culture Results:   <10,000 CFU/mL Normal Urogenital Angle (11-22 @ 22:10)  Culture Results:   No growth to date. (11-22 @ 22:05)      Culture - Urine (collected 24 Nov 2022 08:26)  Source: Catheterized Catheterized  Preliminary Report (26 Nov 2022 17:09):    >100,000 CFU/ml Klebsiella pneumoniae    Culture - Blood (collected 24 Nov 2022 06:40)  Source: .Blood Blood-Peripheral  Preliminary Report (25 Nov 2022 14:01):    No growth to date.    Culture - Blood (collected 24 Nov 2022 06:37)  Source: .Blood Blood-Peripheral  Preliminary Report (25 Nov 2022 14:01):    No growth to date.    Culture - Urine (collected 22 Nov 2022 22:10)  Source: Catheterized Catheterized  Final Report (24 Nov 2022 07:27):    <10,000 CFU/mL Normal Urogenital Angle    Culture - Blood (collected 22 Nov 2022 22:10)  Source: .Blood Blood-Venous  Preliminary Report (24 Nov 2022 02:03):    No growth to date.    Culture - Blood (collected 22 Nov 2022 22:05)  Source: .Blood Blood-Venous  Preliminary Report (24 Nov 2022 02:03):    No growth to date.         Anemia Panel:  Vitamin B12, Serum: 656 pg/mL (11-24-22 @ 06:37)      Thyroid Panel:    RADIOLOGY & ADDITIONAL TESTS:      HEALTH ISSUES - PROBLEM Dx:  AMS (altered mental status)    HTN (hypertension)  Motor system disease    Peripheral neuropathy    History of diabetes mellitus    Major depression    Need for prophylactic measure    Fever    Dysphagia        Consultant(s) Notes Reviewed:  [x  ] YES     Care Discussed with [X] Consultants  [ x ] Patient  [  ] Family [  ] HCP [  ]   [  ] Social Service  [x  ] RN, [  ] Physical Therapy,[  ] Palliative care team  DVT PPX: [  ] Lovenox, [  ] S C Heparin, [  ] Coumadin, [  ] Xarelto, [  ] Eliquis, [  ] Pradaxa, [  ] IV Heparin drip, [  ] SCD [  ] Contraindication 2 to GI Bleed,[  ] Ambulation [  ] Contraindicated 2 to  bleed [x] Contraindicated 2 to Brain Bleed  Advanced directive: [  ] None, [ x ] DNR/DNI Patient is a 72y old  Male who presents with a chief complaint of acute on chronic AMS (26 Nov 2022 15:07)    HPI:  72M BPH CAD, HTN, HLD, Afib, SDH s/p mechanical fall, recurrent ICH when on AC brought today for weakness. History obtained from pt's wife Love and chart review given patient medical condition. Pt admitted from 10/19 to 10/22/2022 at [facility] for SHD s/p adexxa. Pt's wife endorses that patient has been progressively deteriorating, states that now only is reactive to painful stimulus, prior used to articulate some words. Patient completed 2 weeks of outpatient PT, has 8-10 hours nurse aid daily. Temp at home 99.9. Wife assist with feeding, was seen by neuro who start mirtazapine and sent for neurologic work up.     ED course  VS: Afebrile, HR: 96, BP: 154/97, SpO2: 97% RA RR 19  Labs significant for glucose 227, Alkaline phosphate 227, UA shows glucose 250  CT head showed chronic subdural hematomas overlying the left side of the posterior falx measuring up to 1.7 cm and 1.5 cm overlying the left temporal convexity.  Mild mass effect with sulcal effacement and partial compression of the  left lateral ventricle. No new acute intracranial hemorrhage.  EKG shows Afib with RVR @ 110 bpm, Right axis deviation    (23 Nov 2022 01:11)    INTERVAL HPI:  11/24/22: pt seen, examined , Pt had fever 103.8 & 101 early this AM , Afebrile Now, ON IV fluids, IV Bolus given, Leukocytosis ,Started on IV Abx Zosyn, wife at bedside, ID  Dr Boggs called , Hypokalemia - IV K replaced. Remeron d/c.    11/25/22: Patient seen and examined at bedside. Pt following some simple commands (move hands, squeeze finger), otherwise unresponsive to speech and unable to cooperate in AM. Reevaluated x2 in AM by speech, started on moderately thick liquids. WBC improving, replace K     11/26/22: Patient seen and examined at bedside. Pt asleep during exam. Did not open eyes but nodded "yes" when I said his name. Potassium repleted. Wbc continues to improve. Hb dropped from 11.4 to 105. Plan to transfer to [facility] for further management. On IV Abx, WBC Improving     11/27/22: Patient seen and examined at bedside. Pt somewhat cooperative with exam. ROS unreliable. COVID negative. Plan to transfer to [facility]. WBC wnl.    OVERNIGHT EVENTS: NONE    Home Medications:  Dextrose 5% with 0.45% NaCl and KCl 20 mEq/L intravenous solution: 1000 milliliter(s) intravenous every 24 hours (26 Nov 2022 17:56)  gabapentin 400 mg oral capsule: 1 cap(s) orally 3 times a day (23 Nov 2022 02:49)  hydrALAZINE 25 mg oral tablet: 2 tab(s) orally every 8 hours (23 Nov 2022 02:49)  latanoprost 0.005% ophthalmic solution: 1 drop(s) to each affected eye once a day (in the evening) (23 Nov 2022 02:49)  methylphenidate 10 mg oral tablet: 2 pills at 8AM; 1 pill at 1PM (23 Nov 2022 02:49)  mirtazapine 7.5 mg oral tablet: 1 tab(s) orally once a day (at bedtime) (23 Nov 2022 02:49)  piperacillin-tazobactam: 3.375 gram(s) in dextrose 5% 100mL intravenous every 8 hours (26 Nov 2022 13:03)  rosuvastatin 10 mg oral tablet: 1 tab(s) orally once a day (23 Nov 2022 02:49)      MEDICATIONS  (STANDING):  dextrose 5% + sodium chloride 0.45% with potassium chloride 20 mEq/L 1000 milliLiter(s) (80 mL/Hr) IV Continuous <Continuous>  dextrose 5%. 1000 milliLiter(s) (100 mL/Hr) IV Continuous <Continuous>  dextrose 5%. 1000 milliLiter(s) (50 mL/Hr) IV Continuous <Continuous>  dextrose 50% Injectable 25 Gram(s) IV Push once  dextrose 50% Injectable 12.5 Gram(s) IV Push once  dextrose 50% Injectable 25 Gram(s) IV Push once  diltiazem    milliGRAM(s) Oral daily  gabapentin 400 milliGRAM(s) Oral three times a day  glucagon  Injectable 1 milliGRAM(s) IntraMuscular once  insulin lispro (ADMELOG) corrective regimen sliding scale   SubCutaneous every 6 hours  latanoprost 0.005% Ophthalmic Solution 1 Drop(s) Both EYES at bedtime  methylphenidate 20 milliGRAM(s) Oral daily  methylphenidate 10 milliGRAM(s) Oral daily  piperacillin/tazobactam IVPB.. 3.375 Gram(s) IV Intermittent every 8 hours    MEDICATIONS  (PRN):  acetaminophen  Suppository .. 650 milliGRAM(s) Rectal every 6 hours PRN Temp greater or equal to 38C (100.4F)  aluminum hydroxide/magnesium hydroxide/simethicone Suspension 30 milliLiter(s) Oral every 4 hours PRN Dyspepsia  dextrose Oral Gel 15 Gram(s) Oral once PRN Blood Glucose LESS THAN 70 milliGRAM(s)/deciliter  ondansetron Injectable 4 milliGRAM(s) IV Push every 8 hours PRN Nausea and/or Vomiting      Allergies    No Known Allergies    Intolerances        Social History:  Tobacco: denies   EtOH: denies   Recreational drug use: denies   Lives with: wife   Ambulates: bedbound, has a nurse aid 8-10 daily   Vaccinations: Fully COVID-19 vaccinated (23 Nov 2022 01:11)    REVIEW OF SYSTEMS:  unable to accurately obtain due to patient condition.    Vital Signs Last 24 Hrs  T(C): 37 (27 Nov 2022 04:51), Max: 37 (27 Nov 2022 04:51)  T(F): 98.6 (27 Nov 2022 04:51), Max: 98.6 (27 Nov 2022 04:51)  HR: 97 (27 Nov 2022 06:11) (83 - 104)  BP: 134/85 (27 Nov 2022 06:11) (128/87 - 138/84)  BP(mean): --  RR: 18 (27 Nov 2022 04:51) (18 - 18)  SpO2: 92% (27 Nov 2022 04:51) (92% - 97%)    Parameters below as of 27 Nov 2022 04:51  Patient On (Oxygen Delivery Method): nasal cannula      Finger Stick        11-26 @ 07:01  -  11-27 @ 07:00  --------------------------------------------------------  IN: 960 mL / OUT: 0 mL / NET: 960 mL        PHYSICAL EXAM: awake, smiles , ~non verbal  GENERAL:  [ x ] NAD , [ x ] well appearing, [  ] Agitated, [x  ] Drowsy,  [x  ] Lethargy, [  ] confused   HEAD:  [ x ] Normal, [  ] Other  EYES:  Unable to examine, pt is uncooperative on exam  ENMT:  [ x ] Normal, [x  ] Dry  mucous membranes, [ x ] Good dentition, [ x ] No Thrush  NECK:  [ x ] Supple, [ x ] No JVD, [ x ] Normal thyroid, [  ] Lymphadenopathy [  ] Other  CHEST/LUNG:  [ x ] Clear to auscultation bilaterally, [ x ] Breath Sounds equal B/L / Decrease, [x  ] poor effort  [ x ] No rales, [ x ] No rhonchi  [x  ]  No wheezing,   HEART:  [ x ] Regular rate and rhythm, [X] tachycardia, [  ] Bradycardia,  [  ] irregular  [ x ] No murmurs, No rubs, No gallops, [  ] PPM in place (Mfr:  )  ABDOMEN: [ x ] Soft, [ x ] Nondistended, [ x ] No mass, [x  ] Bowel sounds present, [x  ] obese  NERVOUS SYSTEM:  [  ] Alert & Oriented x , [ x ] Nonfocal  [  ] Confusion  [  ] Encephalopathic [  ] Sedated [x  ] Unable to assess- asleep during exam, did not wake up [  ] Dementia   EXTREMITIES: [ x ] No clubbing, No cyanosis,  [  ] edema B/L lower EXT. [ x ] PVD stasis skin changes B/L Lower EXT, [  ] wound + Lower Ext pain -  LYMPH: No lymphadenopathy noted  SKIN:  [  ] No rashes or lesions, [  ] Pressure Ulcers, [  ] ecchymosis, [  ] Skin Tears, [x  ] Other- dry skin, with Scabs all EXT    LABS:                        10.4   10.23 )-----------( 168      ( 27 Nov 2022 10:10 )             31.5     27 Nov 2022 10:10    140    |  113    |  7      ----------------------------<  183    4.2     |  21     |  0.79     Ca    8.0        27 Nov 2022 10:10  Phos  1.8       27 Nov 2022 10:10  Mg     1.7       27 Nov 2022 10:10    TPro  5.3    /  Alb  1.9    /  TBili  0.6    /  DBili  x      /  AST  36     /  ALT  44     /  AlkPhos  93     27 Nov 2022 10:10          Culture Results:   >100,000 CFU/ml Klebsiella pneumoniae (11-24 @ 08:26)  Culture Results:   No growth to date. (11-24 @ 06:40)  Culture Results:   No growth to date. (11-24 @ 06:37)  Culture Results:   No growth to date. (11-22 @ 22:10)  Culture Results:   <10,000 CFU/mL Normal Urogenital Angle (11-22 @ 22:10)  Culture Results:   No growth to date. (11-22 @ 22:05)      Culture - Urine (collected 24 Nov 2022 08:26)  Source: Catheterized Catheterized  Preliminary Report (26 Nov 2022 17:09):    >100,000 CFU/ml Klebsiella pneumoniae    Culture - Blood (collected 24 Nov 2022 06:40)  Source: .Blood Blood-Peripheral  Preliminary Report (25 Nov 2022 14:01):    No growth to date.    Culture - Blood (collected 24 Nov 2022 06:37)  Source: .Blood Blood-Peripheral  Preliminary Report (25 Nov 2022 14:01):    No growth to date.    Culture - Urine (collected 22 Nov 2022 22:10)  Source: Catheterized Catheterized  Final Report (24 Nov 2022 07:27):    <10,000 CFU/mL Normal Urogenital Angle    Culture - Blood (collected 22 Nov 2022 22:10)  Source: .Blood Blood-Venous  Preliminary Report (24 Nov 2022 02:03):    No growth to date.    Culture - Blood (collected 22 Nov 2022 22:05)  Source: .Blood Blood-Venous  Preliminary Report (24 Nov 2022 02:03):    No growth to date.         Anemia Panel:  Vitamin B12, Serum: 656 pg/mL (11-24-22 @ 06:37)      Thyroid Panel:    RADIOLOGY & ADDITIONAL TESTS:      HEALTH ISSUES - PROBLEM Dx:  AMS (altered mental status)    HTN (hypertension)  Motor system disease    Peripheral neuropathy    History of diabetes mellitus    Major depression    Need for prophylactic measure    Fever    Dysphagia        Consultant(s) Notes Reviewed:  [x  ] YES     Care Discussed with [X] Consultants  [ x ] Patient  [ x ] Family [  ] HCP [  ]   [  ] Social Service  [x  ] RN, [  ] Physical Therapy,[  ] Palliative care team  DVT PPX: [  ] Lovenox, [  ] S C Heparin, [  ] Coumadin, [  ] Xarelto, [  ] Eliquis, [  ] Pradaxa, [  ] IV Heparin drip, [  ] SCD [  ] Contraindication 2 to GI Bleed,[  ] Ambulation [  ] Contraindicated 2 to  bleed [x] Contraindicated 2 to Brain Bleed  Advanced directive: [  ] None, [ x ] DNR/DNI

## 2022-11-27 NOTE — SWALLOW BEDSIDE ASSESSMENT ADULT - ASR SWALLOW REFERRAL
Continued RD services to ensure adequate daily caloric nutritional intake/Registered Dietitian
Registered Dietitian

## 2022-11-27 NOTE — SWALLOW BEDSIDE ASSESSMENT ADULT - ASR SWALLOW ASPIRATION MONITOR
change of breathing pattern/oral hygiene/position upright (90Y)/cough/gurgly voice/fever/pneumonia/throat clearing/upper respiratory infection
if any s/s penetration/aspiration noted d/c PO & resume NPO with SLP to reassess/change of breathing pattern/oral hygiene/position upright (90Y)/cough/gurgly voice/fever/throat clearing/upper respiratory infection

## 2022-11-27 NOTE — SWALLOW BEDSIDE ASSESSMENT ADULT - SWALLOW EVAL: PROGNOSIS
Favorable for rx'd diet
DIAGNOSIS CONTINUED: suspected delayed swallow trigger and a change in vocal quality to a "wet" tone suggestive of penetration/aspiration. 5. Recommend maintain pureed and moderately thick liquids via teaspoon with aspiration precautions and full assistance during meals. If change in mentation/respiratory status noted, hold PO and please notify this department. PROGNOSIS: fair; continue to monitor and notify SLP if changes occur

## 2022-11-27 NOTE — PROGRESS NOTE ADULT - SUBJECTIVE AND OBJECTIVE BOX
[facility]  Lorenzo Adam PA-C  [address] [place] [address]  [phone]      INTERVAL HPI/OVERNIGHT EVENTS:  Pt s/e  offers no complaints  Palliative eval noted            MEDICATIONS  (STANDING):  dextrose 5% + sodium chloride 0.45%. 1000 milliLiter(s) (80 mL/Hr) IV Continuous <Continuous>  dextrose 5%. 1000 milliLiter(s) (100 mL/Hr) IV Continuous <Continuous>  dextrose 5%. 1000 milliLiter(s) (50 mL/Hr) IV Continuous <Continuous>  dextrose 50% Injectable 25 Gram(s) IV Push once  dextrose 50% Injectable 12.5 Gram(s) IV Push once  dextrose 50% Injectable 25 Gram(s) IV Push once  diltiazem    milliGRAM(s) Oral daily  gabapentin 400 milliGRAM(s) Oral three times a day  glucagon  Injectable 1 milliGRAM(s) IntraMuscular once  insulin lispro (ADMELOG) corrective regimen sliding scale   SubCutaneous every 6 hours  latanoprost 0.005% Ophthalmic Solution 1 Drop(s) Both EYES at bedtime  methylphenidate 20 milliGRAM(s) Oral daily  methylphenidate 10 milliGRAM(s) Oral daily  piperacillin/tazobactam IVPB.. 3.375 Gram(s) IV Intermittent every 8 hours  sodium phosphate 30 milliMole(s)/500 mL IVPB 30 milliMole(s) IV Intermittent once    MEDICATIONS  (PRN):  acetaminophen  Suppository .. 650 milliGRAM(s) Rectal every 6 hours PRN Temp greater or equal to 38C (100.4F)  aluminum hydroxide/magnesium hydroxide/simethicone Suspension 30 milliLiter(s) Oral every 4 hours PRN Dyspepsia  dextrose Oral Gel 15 Gram(s) Oral once PRN Blood Glucose LESS THAN 70 milliGRAM(s)/deciliter  ondansetron Injectable 4 milliGRAM(s) IV Push every 8 hours PRN Nausea and/or Vomiting      Allergies    No Known Allergies    Intolerances          PHYSICAL EXAM  Vital Signs Last 24 Hrs  T(C): 37 (27 Nov 2022 04:51), Max: 37 (27 Nov 2022 04:51)  T(F): 98.6 (27 Nov 2022 04:51), Max: 98.6 (27 Nov 2022 04:51)  HR: 97 (27 Nov 2022 06:11) (83 - 104)  BP: 134/85 (27 Nov 2022 06:11) (128/87 - 138/84)  BP(mean): --  RR: 18 (27 Nov 2022 04:51) (18 - 18)  SpO2: 92% (27 Nov 2022 04:51) (92% - 97%)    Parameters below as of 27 Nov 2022 04:51  Patient On (Oxygen Delivery Method): nasal cannula          GENERAL:  Appears stated age  ABDOMEN:  Soft, non-tender, non-distended  NEURO:  Alert        LABS:                        10.4   10.23 )-----------( 168      ( 27 Nov 2022 10:10 )             31.5     11-27    140  |  113<H>  |  7   ----------------------------<  183<H>  4.2   |  21<L>  |  0.79    Ca    8.0<L>      27 Nov 2022 10:10  Phos  1.8     11-27  Mg     1.7     11-27    TPro  5.3<L>  /  Alb  1.9<L>  /  TBili  0.6  /  DBili  x   /  AST  36  /  ALT  44  /  AlkPhos  93  11-27 11-26-22 @ 07:01  -  11-27-22 @ 07:00  --------------------------------------------------------  IN: 960 mL / OUT: 0 mL / NET: 960 mL    11-27-22 @ 07:01  -  11-27-22 @ 12:08  --------------------------------------------------------  IN: 400 mL / OUT: 0 mL / NET: 400 mL

## 2022-11-27 NOTE — PROGRESS NOTE ADULT - PROBLEM SELECTOR PLAN 2
Acute on chronic, per wife pt has been progressively deteriorating, reactive to painful stimulus only, prior used to articulate some words.  -Unclear Etiology  ? SDH   - CT head showed chronic subdural hematomas overlying the left side of the posterior falx measuring up to 1.7 cm and 1.5 cm overlying the left temporal convexity.  Mild mass effect with sulcal effacement and partial compression of the  left lateral ventricle. No new acute intracranial hemorrhage.  - Neuro Dr. Main recs: AMS/SDH,   - Dr. Main spoke to wife- plan for transfer to [facility] for further management   - MRI done 11/25   - GI Dr. Salgado -NO PEG as per Family   - PT /OT consulted  - Palliative care consulted for GOC-DNR/DNI Acute on chronic, per wife pt has been progressively deteriorating, reactive to painful stimulus only, prior used to articulate some words.  -Unclear Etiology  ? SDH AMS improving   - CT head showed chronic subdural hematomas overlying the left side of the posterior falx measuring up to 1.7 cm and 1.5 cm overlying the left temporal convexity.  Mild mass effect with sulcal effacement and partial compression of the  left lateral ventricle. No new acute intracranial hemorrhage.  - Neuro Dr. Main recs: AMS/SDH,   - Dr. Main spoke to wife- plan for transfer to [facility] for further management   - MRI done 11/25   - GI Dr. Salgado -NO PEG as per Family   - PT /OT consulted  - Palliative care consulted for GOC-DNR/DNI

## 2022-11-27 NOTE — PROGRESS NOTE ADULT - SUBJECTIVE AND OBJECTIVE BOX
Neurology Follow up note    MELBA [id number]    HPI:  72M BPH CAD, HTN, HLD, Afib, SDH s/p mechanical fall, recurrent ICH when on AC brought today for weakness. History obtained from pt's wife Love and chart review given patient medical condition. Pt admitted from 10/19 to 10/22/2022 at [facility] for SHD s/p adexxa. Pt's wife endorses that patient has been progressively deteriorating, states that now only is reactive to painful stimulus, prior used to articulate some words. Patient completed 2 weeks of outpatient PT, has 8-10 hours nurse aid daily. Temp at home 99.9. Wife assist with feeding, was seen by neuro who start mirtazapine and sent for neurologic work up.     ED course  VS: Afebrile, HR: 96, BP: 154/97, SpO2: 97% RA RR 19  Labs significant for glucose 227, Alkaline phosphate 227, UA shows glucose 250  CT head showed chronic subdural hematomas overlying the left side of the posterior falx measuring up to 1.7 cm and 1.5 cm overlying the left temporal convexity.  Mild mass effect with sulcal effacement and partial compression of the  left lateral ventricle. No new acute intracranial hemorrhage.  EKG shows Afib with RVR @ 110 bpm, Right axis deviation    (23 Nov 2022 01:11)      Interval History; more interactive today    Patient is seen, chart was reviewed and case was discussed with the treatment team.  Pt is not in any distress.   Lying on bed comfortably.       Vital Signs Last 24 Hrs  T(C): 36.8 (27 Nov 2022 13:09), Max: 37 (27 Nov 2022 04:51)  T(F): 98.2 (27 Nov 2022 13:09), Max: 98.6 (27 Nov 2022 04:51)  HR: 92 (27 Nov 2022 13:09) (83 - 102)  BP: 148/82 (27 Nov 2022 13:09) (134/85 - 148/82)  BP(mean): --  RR: 16 (27 Nov 2022 13:09) (16 - 18)  SpO2: 92% (27 Nov 2022 13:09) (92% - 94%)    Parameters below as of 27 Nov 2022 13:09  Patient On (Oxygen Delivery Method): room air              REVIEW OF SYSTEMS:    unobtainable 2/2 poor mentation  not in any distress    On Neurological Examination:    Mental Status - Pt opened eyes after calling his name  smiling  not following any simple commands     Speech -  nodding/no    Cranial Nerves - Pupils 3 mm equal and reactive to light, extraocular eye movements intact.   Pt has no facial asymmetry.     Muscle tone -increased    Motor Exam - 2/5 of UE  LE 1/5    Sensory Exam -  Pt withdraws all extremities equally on stimulation.      Deep tendon Reflexes - 2 plus all over.    Neck Supple -  Yes.       MEDICATIONS  (STANDING):  dextrose 5% + sodium chloride 0.45% with potassium chloride 20 mEq/L 1000 milliLiter(s) (80 mL/Hr) IV Continuous <Continuous>  dextrose 5%. 1000 milliLiter(s) (100 mL/Hr) IV Continuous <Continuous>  dextrose 5%. 1000 milliLiter(s) (50 mL/Hr) IV Continuous <Continuous>  dextrose 50% Injectable 25 Gram(s) IV Push once  dextrose 50% Injectable 12.5 Gram(s) IV Push once  dextrose 50% Injectable 25 Gram(s) IV Push once  diltiazem    milliGRAM(s) Oral daily  gabapentin 400 milliGRAM(s) Oral three times a day  glucagon  Injectable 1 milliGRAM(s) IntraMuscular once  insulin lispro (ADMELOG) corrective regimen sliding scale   SubCutaneous every 6 hours  latanoprost 0.005% Ophthalmic Solution 1 Drop(s) Both EYES at bedtime  methylphenidate 20 milliGRAM(s) Oral daily  methylphenidate 10 milliGRAM(s) Oral daily  piperacillin/tazobactam IVPB.. 3.375 Gram(s) IV Intermittent every 8 hours  potassium chloride  10 mEq/100 mL IVPB 10 milliEquivalent(s) IV Intermittent every 1 hour    MEDICATIONS  (PRN):  acetaminophen  Suppository .. 650 milliGRAM(s) Rectal every 6 hours PRN Temp greater or equal to 38C (100.4F)  aluminum hydroxide/magnesium hydroxide/simethicone Suspension 30 milliLiter(s) Oral every 4 hours PRN Dyspepsia  dextrose Oral Gel 15 Gram(s) Oral once PRN Blood Glucose LESS THAN 70 milliGRAM(s)/deciliter  ondansetron Injectable 4 milliGRAM(s) IV Push every 8 hours PRN Nausea and/or Vomiting    Allergies    No Known Allergies    Intolerances                        10.4   10.23 )-----------( 168      ( 27 Nov 2022 10:10 )             31.5     Hemoglobin A1C:     Vitamin B12     RADIOLOGY  < from: MR Head No Cont (11.25.22 @ 16:15) >    ACC: [id number] EXAM:  MR BRAIN                          PROCEDURE DATE:  11/25/2022          INTERPRETATION:  MR of the brain without gadolinium contrast    CLINICAL INFORMATION:   CVA  hx of TBI/SDH/AMS    TECHNIQUE:   Sagittal and axial T1-weighted images, axial FLAIR images,   axial susceptibility weighted images, axial T2-weighted images and axial   diffusion weighted images of the brain were obtained.    COMPARISON:   CT head 11/22/2022, 10/20/2022 and previous available for   review.    FINDINGS:    BRAIN and CSF SPACES: The patient is again noted for multifocal subdural   hemorrhage distorting the left cerebral hemisphere. The larger component   extends along the lateral convexity from near the temporal tip to the   temporal occipitaljunction and measures up to 1.4 cm thickness when   measured perpendicular to the inner table of the calvarium. This   collection is heterogeneous in signal intensity within septations and   dependent layering. It displaces and compresses gyri of theleft temporal   lobe and largely effaces the temporal horn of the left lateral ventricle.   A noncontiguous collection overlies the left cerebral hemisphere medial   aspect along the left posterior falx. This collection measures up to 1.6   cm thickness. It displaces gyri of the left parietal lobe. This   collection has a thick rind that is low signal intensity on all pulse   sequences. This contributes to 2 deformity, displacement and partial   effacement of the atrium of the left lateral ventricle. This parafalcine   component is associated with downward extension to the left tentorium.   These hemorrhages are associated with only slight left-to-right   subfalcine herniation, less than 0.3 cm at the anterior septum.    Linear hyperintensities found within multiple cysts sulci of the left   cerebral hemisphere. This does not appear to be artifactual and is   consistent with subarachnoid space hemorrhage.    Focal encephalomalacia is again noted in the supraorbital right frontal   lobe and at the right temporal tip. Extensive gliotic signal   hyperintensity is noted on the long TR images throughout much of the   cerebral hemispheric white matter. There is asymmetric ex vacuo   dilatation of the right lateral ventricle particularly itstemporal horn.   Deep cerebral hemispheric white matter hyperintensity is also present in   the left cerebral hemisphere, consistent with considerable ischemic white   matter disease within each cerebral hemisphere. Similar ventral pontine   signal intensity also likely reflects long-standing ischemic white matter   disease.    VESSELS:   The vertebral and internal carotid arteries demonstrate   expected flow voids indicating their patency.  The right vertebral artery   is dominant caliber. The posterior circulation is tortuous.    HEAD AND NECK STRUCTURES:   The orbits are unremarkable.  Paranasal   sinuses are clear.  The nasal cavity demonstrates irregular deviation   nasal septum left-to-right.  The central skull base appears intact.  The  nasopharynx contains a small volume dependent fluid.  The temporal bones   appear clear of disease.  The calvarium demonstrates a right frontal   craniotomy defect.      IMPRESSION:    1. Left lateral convexity subacute to long-standing subdural hematoma,   unchanged from 11/22/2022, increased from 10/20/2022    2. Left posterior parafalcine subacute to long-standing subdural   hematoma, unchanged since 10/20/2022    3. Left posterior convexity subarachnoid space hemorrhage (favored over   artifact)    4. Right frontal and right temporal tip focal encephalomalacia consistent   with posttraumatic etiology    5. Severe cerebral hemispheric white matter abnormality likely reflects   both ischemia and posttraumatic injury    6. Diffuse brain volume loss greater than typical for age consistent with   Central cerebral hemispheric white matter volume loss    --- End of Report ---            ALBERT TONY MD; Attending Radiologist  This document has been electronically signed. Nov 25 2022  4:52PM      ASSESSMENT AND PLAN:    seen for ams-multifactorial     Subacute left lateral convexity(temporo occipital) subdural hematoma  left parafalcine subacute sdh  ME  hx of TBI/SDH  FEVER    waiting for bed at [facility]  management dw dr kirkpatrick and patient wife  avoid ac  antibiotic as per ID  Physical therapy evaluation.  OOB to chair/ambulation with assistance only  Pain is accessed and addressed.  Would continue to follow.

## 2022-11-28 LAB
ANION GAP SERPL CALC-SCNC: 8 MMOL/L — SIGNIFICANT CHANGE UP (ref 5–17)
BASOPHILS # BLD AUTO: 0.04 K/UL — SIGNIFICANT CHANGE UP (ref 0–0.2)
BASOPHILS NFR BLD AUTO: 0.6 % — SIGNIFICANT CHANGE UP (ref 0–2)
BUN SERPL-MCNC: 6 MG/DL — LOW (ref 7–23)
CALCIUM SERPL-MCNC: 8 MG/DL — LOW (ref 8.5–10.1)
CHLORIDE SERPL-SCNC: 111 MMOL/L — HIGH (ref 96–108)
CO2 SERPL-SCNC: 24 MMOL/L — SIGNIFICANT CHANGE UP (ref 22–31)
CREAT SERPL-MCNC: 0.9 MG/DL — SIGNIFICANT CHANGE UP (ref 0.5–1.3)
CULTURE RESULTS: SIGNIFICANT CHANGE UP
CULTURE RESULTS: SIGNIFICANT CHANGE UP
EGFR: 92 ML/MIN/1.73M2 — SIGNIFICANT CHANGE UP
EOSINOPHIL # BLD AUTO: 0.33 K/UL — SIGNIFICANT CHANGE UP (ref 0–0.5)
EOSINOPHIL NFR BLD AUTO: 4.6 % — SIGNIFICANT CHANGE UP (ref 0–6)
GLUCOSE SERPL-MCNC: 138 MG/DL — HIGH (ref 70–99)
HCT VFR BLD CALC: 31.3 % — LOW (ref 39–50)
HGB BLD-MCNC: 10.3 G/DL — LOW (ref 13–17)
IMM GRANULOCYTES NFR BLD AUTO: 3.5 % — HIGH (ref 0–0.9)
LYMPHOCYTES # BLD AUTO: 1.02 K/UL — SIGNIFICANT CHANGE UP (ref 1–3.3)
LYMPHOCYTES # BLD AUTO: 14.3 % — SIGNIFICANT CHANGE UP (ref 13–44)
MAGNESIUM SERPL-MCNC: 1.7 MG/DL — SIGNIFICANT CHANGE UP (ref 1.6–2.6)
MCHC RBC-ENTMCNC: 25.6 PG — LOW (ref 27–34)
MCHC RBC-ENTMCNC: 32.9 GM/DL — SIGNIFICANT CHANGE UP (ref 32–36)
MCV RBC AUTO: 77.7 FL — LOW (ref 80–100)
MONOCYTES # BLD AUTO: 0.65 K/UL — SIGNIFICANT CHANGE UP (ref 0–0.9)
MONOCYTES NFR BLD AUTO: 9.1 % — SIGNIFICANT CHANGE UP (ref 2–14)
NEUTROPHILS # BLD AUTO: 4.84 K/UL — SIGNIFICANT CHANGE UP (ref 1.8–7.4)
NEUTROPHILS NFR BLD AUTO: 67.9 % — SIGNIFICANT CHANGE UP (ref 43–77)
NRBC # BLD: 0 /100 WBCS — SIGNIFICANT CHANGE UP (ref 0–0)
PHOSPHATE SERPL-MCNC: 3.8 MG/DL — SIGNIFICANT CHANGE UP (ref 2.5–4.5)
PLATELET # BLD AUTO: 190 K/UL — SIGNIFICANT CHANGE UP (ref 150–400)
POTASSIUM SERPL-MCNC: 3.4 MMOL/L — LOW (ref 3.5–5.3)
POTASSIUM SERPL-SCNC: 3.4 MMOL/L — LOW (ref 3.5–5.3)
RBC # BLD: 4.03 M/UL — LOW (ref 4.2–5.8)
RBC # FLD: 16 % — HIGH (ref 10.3–14.5)
SODIUM SERPL-SCNC: 143 MMOL/L — SIGNIFICANT CHANGE UP (ref 135–145)
SPECIMEN SOURCE: SIGNIFICANT CHANGE UP
SPECIMEN SOURCE: SIGNIFICANT CHANGE UP
WBC # BLD: 7.13 K/UL — SIGNIFICANT CHANGE UP (ref 3.8–10.5)
WBC # FLD AUTO: 7.13 K/UL — SIGNIFICANT CHANGE UP (ref 3.8–10.5)

## 2022-11-28 PROCEDURE — 74230 X-RAY XM SWLNG FUNCJ C+: CPT | Mod: 26

## 2022-11-28 RX ORDER — INSULIN LISPRO 100/ML
VIAL (ML) SUBCUTANEOUS
Refills: 0 | Status: DISCONTINUED | OUTPATIENT
Start: 2022-11-28 | End: 2022-11-29

## 2022-11-28 RX ORDER — POTASSIUM CHLORIDE 20 MEQ
10 PACKET (EA) ORAL
Refills: 0 | Status: COMPLETED | OUTPATIENT
Start: 2022-11-28 | End: 2022-11-28

## 2022-11-28 RX ORDER — MAGNESIUM SULFATE 500 MG/ML
1 VIAL (ML) INJECTION ONCE
Refills: 0 | Status: COMPLETED | OUTPATIENT
Start: 2022-11-28 | End: 2022-11-28

## 2022-11-28 RX ORDER — INSULIN LISPRO 100/ML
VIAL (ML) SUBCUTANEOUS AT BEDTIME
Refills: 0 | Status: DISCONTINUED | OUTPATIENT
Start: 2022-11-28 | End: 2022-11-29

## 2022-11-28 RX ORDER — DEXTROSE MONOHYDRATE, SODIUM CHLORIDE, AND POTASSIUM CHLORIDE 50; .745; 4.5 G/1000ML; G/1000ML; G/1000ML
1000 INJECTION, SOLUTION INTRAVENOUS
Refills: 0 | Status: DISCONTINUED | OUTPATIENT
Start: 2022-11-28 | End: 2022-11-29

## 2022-11-28 RX ORDER — CEFUROXIME AXETIL 250 MG
500 TABLET ORAL EVERY 12 HOURS
Refills: 0 | Status: DISCONTINUED | OUTPATIENT
Start: 2022-11-28 | End: 2022-11-29

## 2022-11-28 RX ADMIN — DEXTROSE MONOHYDRATE, SODIUM CHLORIDE, AND POTASSIUM CHLORIDE 80 MILLILITER(S): 50; .745; 4.5 INJECTION, SOLUTION INTRAVENOUS at 20:33

## 2022-11-28 RX ADMIN — Medication 100 MILLIEQUIVALENT(S): at 14:07

## 2022-11-28 RX ADMIN — DEXTROSE MONOHYDRATE, SODIUM CHLORIDE, AND POTASSIUM CHLORIDE 80 MILLILITER(S): 50; .745; 4.5 INJECTION, SOLUTION INTRAVENOUS at 10:36

## 2022-11-28 RX ADMIN — Medication 1: at 06:24

## 2022-11-28 RX ADMIN — Medication 1: at 00:41

## 2022-11-28 RX ADMIN — GABAPENTIN 400 MILLIGRAM(S): 400 CAPSULE ORAL at 15:11

## 2022-11-28 RX ADMIN — Medication 10 MILLIGRAM(S): at 12:37

## 2022-11-28 RX ADMIN — PIPERACILLIN AND TAZOBACTAM 25 GRAM(S): 4; .5 INJECTION, POWDER, LYOPHILIZED, FOR SOLUTION INTRAVENOUS at 06:23

## 2022-11-28 RX ADMIN — Medication 500 MILLIGRAM(S): at 17:25

## 2022-11-28 RX ADMIN — GABAPENTIN 400 MILLIGRAM(S): 400 CAPSULE ORAL at 06:23

## 2022-11-28 RX ADMIN — LATANOPROST 1 DROP(S): 0.05 SOLUTION/ DROPS OPHTHALMIC; TOPICAL at 22:18

## 2022-11-28 RX ADMIN — GABAPENTIN 400 MILLIGRAM(S): 400 CAPSULE ORAL at 22:17

## 2022-11-28 RX ADMIN — Medication 3: at 12:39

## 2022-11-28 RX ADMIN — Medication 100 GRAM(S): at 17:22

## 2022-11-28 RX ADMIN — Medication 20 MILLIGRAM(S): at 09:13

## 2022-11-28 RX ADMIN — Medication 100 MILLIEQUIVALENT(S): at 15:09

## 2022-11-28 RX ADMIN — Medication 100 MILLIEQUIVALENT(S): at 12:37

## 2022-11-28 RX ADMIN — Medication 120 MILLIGRAM(S): at 06:23

## 2022-11-28 RX ADMIN — Medication 1: at 18:05

## 2022-11-28 NOTE — PROGRESS NOTE ADULT - SUBJECTIVE AND OBJECTIVE BOX
[facility] DIV[facility] of INFECTIOUS DISEASE  Ovidio Manning MD PhD, Jojo Boggs MD, Hafsa Bhardwaj MD, Danica Goldsmith MD, Samy Terry MD  and providing coverage with Meet Perea MD  Providing Infectious Disease Consultations at [facility], [facility], [facility]'s    Office# [phone] to schedule follow up appointments  Answering Service for urgent calls or New Consults [phone]  Cell# to text for urgent issues Ovidio Manning [phone]     infectious diseases progress note:    MELBA PARMAR is a 72y y. o. Male patient    Overnight and events of the last 24hrs reviewed    Allergies    No Known Allergies    Intolerances        ANTIBIOTICS/RELEVANT:  antimicrobials  piperacillin/tazobactam IVPB.. 3.375 Gram(s) IV Intermittent every 8 hours    immunologic:    OTHER:  acetaminophen  Suppository .. 650 milliGRAM(s) Rectal every 6 hours PRN  aluminum hydroxide/magnesium hydroxide/simethicone Suspension 30 milliLiter(s) Oral every 4 hours PRN  dextrose 5% + sodium chloride 0.45% with potassium chloride 20 mEq/L 1000 milliLiter(s) IV Continuous <Continuous>  dextrose 5%. 1000 milliLiter(s) IV Continuous <Continuous>  dextrose 5%. 1000 milliLiter(s) IV Continuous <Continuous>  dextrose 50% Injectable 25 Gram(s) IV Push once  dextrose 50% Injectable 12.5 Gram(s) IV Push once  dextrose 50% Injectable 25 Gram(s) IV Push once  dextrose Oral Gel 15 Gram(s) Oral once PRN  diltiazem    milliGRAM(s) Oral daily  gabapentin 400 milliGRAM(s) Oral three times a day  glucagon  Injectable 1 milliGRAM(s) IntraMuscular once  insulin lispro (ADMELOG) corrective regimen sliding scale   SubCutaneous every 6 hours  latanoprost 0.005% Ophthalmic Solution 1 Drop(s) Both EYES at bedtime  magnesium sulfate  IVPB 1 Gram(s) IV Intermittent once  methylphenidate 20 milliGRAM(s) Oral daily  methylphenidate 10 milliGRAM(s) Oral daily  ondansetron Injectable 4 milliGRAM(s) IV Push every 8 hours PRN      Objective:  Vital Signs Last 24 Hrs  T(C): 36.4 (28 Nov 2022 04:47), Max: 36.8 (27 Nov 2022 13:09)  T(F): 97.6 (28 Nov 2022 04:47), Max: 98.2 (27 Nov 2022 13:09)  HR: 84 (28 Nov 2022 04:47) (84 - 97)  BP: 149/96 (28 Nov 2022 04:47) (148/82 - 161/98)  BP(mean): --  RR: 18 (28 Nov 2022 04:47) (16 - 18)  SpO2: 95% (28 Nov 2022 04:47) (92% - 95%)    Parameters below as of 28 Nov 2022 04:47  Patient On (Oxygen Delivery Method): room air        T(C): 36.4 (11-28-22 @ 04:47), Max: 37 (11-27-22 @ 04:51)  T(C): 36.4 (11-28-22 @ 04:47), Max: 37.1 (11-25-22 @ 12:50)  T(C): 36.4 (11-28-22 @ 04:47), Max: 37.4 (11-24-22 @ 20:17)    PHYSICAL EXAM:  HEENT: NC atraumatic  Neck: supple  Respiratory: no accessory muscle use, breathing comfortably  Cardiovascular: distant  Gastrointestinal: normal appearing, nondistended  Extremities: no clubbing, no cyanosis,        LABS:                          10.3   7.13  )-----------( 190      ( 28 Nov 2022 08:25 )             31.3       WBC  7.13 11-28 @ 08:25  10.23 11-27 @ 10:10  13.83 11-26 @ 07:52  14.26 11-25 @ 08:45  25.02 11-24 @ 06:37  8.13 11-23 @ 05:29  10.02 11-22 @ 22:10      11-28    143  |  111<H>  |  6<L>  ----------------------------<  138<H>  3.4<L>   |  24  |  0.90    Ca    8.0<L>      28 Nov 2022 08:25  Phos  3.8     11-28  Mg     1.7     11-28    TPro  5.3<L>  /  Alb  1.9<L>  /  TBili  0.6  /  DBili  x   /  AST  36  /  ALT  44  /  AlkPhos  93  11-27      Creatinine, Serum: 0.90 mg/dL (11-28-22 @ 08:25)  Creatinine, Serum: 0.79 mg/dL (11-27-22 @ 10:10)  Creatinine, Serum: 0.68 mg/dL (11-26-22 @ 07:52)  Creatinine, Serum: 0.82 mg/dL (11-25-22 @ 08:45)  Creatinine, Serum: 0.97 mg/dL (11-24-22 @ 06:37)  Creatinine, Serum: 0.88 mg/dL (11-23-22 @ 05:29)  Creatinine, Serum: 0.86 mg/dL (11-22-22 @ 22:10)                INFLAMMATORY MARKERS      MICROBIOLOGY:              RADIOLOGY & ADDITIONAL STUDIES:

## 2022-11-28 NOTE — PATIENT PROFILE ADULT - FUNCTIONAL ASSESSMENT - BASIC MOBILITY 6.
1-calculated by average/Not able to assess (calculate score using [facility] averaging method)

## 2022-11-28 NOTE — PROGRESS NOTE ADULT - SUBJECTIVE AND OBJECTIVE BOX
Neurology Follow up note    MELBA [id number]    HPI:  72M BPH CAD, HTN, HLD, Afib, SDH s/p mechanical fall, recurrent ICH when on AC brought today for weakness. History obtained from pt's wife Love and chart review given patient medical condition. Pt admitted from 10/19 to 10/22/2022 at [facility] for SHD s/p adexxa. Pt's wife endorses that patient has been progressively deteriorating, states that now only is reactive to painful stimulus, prior used to articulate some words. Patient completed 2 weeks of outpatient PT, has 8-10 hours nurse aid daily. Temp at home 99.9. Wife assist with feeding, was seen by neuro who start mirtazapine and sent for neurologic work up.     ED course  VS: Afebrile, HR: 96, BP: 154/97, SpO2: 97% RA RR 19  Labs significant for glucose 227, Alkaline phosphate 227, UA shows glucose 250  CT head showed chronic subdural hematomas overlying the left side of the posterior falx measuring up to 1.7 cm and 1.5 cm overlying the left temporal convexity.  Mild mass effect with sulcal effacement and partial compression of the  left lateral ventricle. No new acute intracranial hemorrhage.  EKG shows Afib with RVR @ 110 bpm, Right axis deviation    (23 Nov 2022 01:11)      Interval History; no new events    Patient is seen, chart was reviewed and case was discussed with the treatment team.  Pt is not in any distress.   Lying on bed comfortably.       Vital Signs Last 24 Hrs  T(C): 36.6 (28 Nov 2022 12:52), Max: 36.7 (27 Nov 2022 20:43)  T(F): 97.9 (28 Nov 2022 12:52), Max: 98 (27 Nov 2022 20:43)  HR: 93 (28 Nov 2022 12:52) (84 - 97)  BP: 138/90 (28 Nov 2022 12:52) (138/90 - 161/98)  BP(mean): --  RR: 18 (28 Nov 2022 12:52) (18 - 18)  SpO2: 94% (28 Nov 2022 12:52) (93% - 95%)    Parameters below as of 28 Nov 2022 12:52  Patient On (Oxygen Delivery Method): room air                  REVIEW OF SYSTEMS:    unobtainable 2/2 poor mentation  not in any distress    On Neurological Examination:    Mental Status - Pt awake smiling  after calling his name  barely  following any simple commands     Speech -  nodding/no    Cranial Nerves - Pupils 3 mm equal and reactive to light, extraocular eye movements intact.   Pt has no facial asymmetry.     Muscle tone -increased    Motor Exam - 3/5 of LUE; RUE 2/5  LE 1/5    Sensory Exam -  Pt withdraws all extremities equally on stimulation.      Deep tendon Reflexes - 2 plus all over.    Neck Supple -  Yes.     MEDICATIONS  (STANDING):  cefuroxime   Tablet 500 milliGRAM(s) Oral every 12 hours  dextrose 5% + sodium chloride 0.45% with potassium chloride 20 mEq/L 1000 milliLiter(s) (80 mL/Hr) IV Continuous <Continuous>  dextrose 5%. 1000 milliLiter(s) (100 mL/Hr) IV Continuous <Continuous>  dextrose 5%. 1000 milliLiter(s) (50 mL/Hr) IV Continuous <Continuous>  dextrose 50% Injectable 25 Gram(s) IV Push once  dextrose 50% Injectable 12.5 Gram(s) IV Push once  dextrose 50% Injectable 25 Gram(s) IV Push once  diltiazem    milliGRAM(s) Oral daily  gabapentin 400 milliGRAM(s) Oral three times a day  glucagon  Injectable 1 milliGRAM(s) IntraMuscular once  insulin lispro (ADMELOG) corrective regimen sliding scale   SubCutaneous every 6 hours  latanoprost 0.005% Ophthalmic Solution 1 Drop(s) Both EYES at bedtime  magnesium sulfate  IVPB 1 Gram(s) IV Intermittent once  methylphenidate 20 milliGRAM(s) Oral daily  methylphenidate 10 milliGRAM(s) Oral daily  potassium chloride  10 mEq/100 mL IVPB 10 milliEquivalent(s) IV Intermittent every 1 hour    MEDICATIONS  (PRN):  acetaminophen  Suppository .. 650 milliGRAM(s) Rectal every 6 hours PRN Temp greater or equal to 38C (100.4F)  aluminum hydroxide/magnesium hydroxide/simethicone Suspension 30 milliLiter(s) Oral every 4 hours PRN Dyspepsia  dextrose Oral Gel 15 Gram(s) Oral once PRN Blood Glucose LESS THAN 70 milliGRAM(s)/deciliter  ondansetron Injectable 4 milliGRAM(s) IV Push every 8 hours PRN Nausea and/or Vomiting      Allergies    No Known Allergies    Intolerances                                   10.3   7.13  )-----------( 190      ( 28 Nov 2022 08:25 )             31.3   11-28    143  |  111<H>  |  6<L>  ----------------------------<  138<H>  3.4<L>   |  24  |  0.90    Ca    8.0<L>      28 Nov 2022 08:25  Phos  3.8     11-28  Mg     1.7     11-28    TPro  5.3<L>  /  Alb  1.9<L>  /  TBili  0.6  /  DBili  x   /  AST  36  /  ALT  44  /  AlkPhos  93  11-27    Hemoglobin A1C:     Vitamin B12     RADIOLOGY  < from: MR Head No Cont (11.25.22 @ 16:15) >    ACC: [id number] EXAM:  MR BRAIN                          PROCEDURE DATE:  11/25/2022          INTERPRETATION:  MR of the brain without gadolinium contrast    CLINICAL INFORMATION:   CVA  hx of TBI/SDH/AMS    TECHNIQUE:   Sagittal and axial T1-weighted images, axial FLAIR images,   axial susceptibility weighted images, axial T2-weighted images and axial   diffusion weighted images of the brain were obtained.    COMPARISON:   CT head 11/22/2022, 10/20/2022 and previous available for   review.    FINDINGS:    BRAIN and CSF SPACES: The patient is again noted for multifocal subdural   hemorrhage distorting the left cerebral hemisphere. The larger component   extends along the lateral convexity from near the temporal tip to the   temporal occipitaljunction and measures up to 1.4 cm thickness when   measured perpendicular to the inner table of the calvarium. This   collection is heterogeneous in signal intensity within septations and   dependent layering. It displaces and compresses gyri of theleft temporal   lobe and largely effaces the temporal horn of the left lateral ventricle.   A noncontiguous collection overlies the left cerebral hemisphere medial   aspect along the left posterior falx. This collection measures up to 1.6   cm thickness. It displaces gyri of the left parietal lobe. This   collection has a thick rind that is low signal intensity on all pulse   sequences. This contributes to 2 deformity, displacement and partial   effacement of the atrium of the left lateral ventricle. This parafalcine   component is associated with downward extension to the left tentorium.   These hemorrhages are associated with only slight left-to-right   subfalcine herniation, less than 0.3 cm at the anterior septum.    Linear hyperintensities found within multiple cysts sulci of the left   cerebral hemisphere. This does not appear to be artifactual and is   consistent with subarachnoid space hemorrhage.    Focal encephalomalacia is again noted in the supraorbital right frontal   lobe and at the right temporal tip. Extensive gliotic signal   hyperintensity is noted on the long TR images throughout much of the   cerebral hemispheric white matter. There is asymmetric ex vacuo   dilatation of the right lateral ventricle particularly itstemporal horn.   Deep cerebral hemispheric white matter hyperintensity is also present in   the left cerebral hemisphere, consistent with considerable ischemic white   matter disease within each cerebral hemisphere. Similar ventral pontine   signal intensity also likely reflects long-standing ischemic white matter   disease.    VESSELS:   The vertebral and internal carotid arteries demonstrate   expected flow voids indicating their patency.  The right vertebral artery   is dominant caliber. The posterior circulation is tortuous.    HEAD AND NECK STRUCTURES:   The orbits are unremarkable.  Paranasal   sinuses are clear.  The nasal cavity demonstrates irregular deviation   nasal septum left-to-right.  The central skull base appears intact.  The  nasopharynx contains a small volume dependent fluid.  The temporal bones   appear clear of disease.  The calvarium demonstrates a right frontal   craniotomy defect.      IMPRESSION:    1. Left lateral convexity subacute to long-standing subdural hematoma,   unchanged from 11/22/2022, increased from 10/20/2022    2. Left posterior parafalcine subacute to long-standing subdural   hematoma, unchanged since 10/20/2022    3. Left posterior convexity subarachnoid space hemorrhage (favored over   artifact)    4. Right frontal and right temporal tip focal encephalomalacia consistent   with posttraumatic etiology    5. Severe cerebral hemispheric white matter abnormality likely reflects   both ischemia and posttraumatic injury    6. Diffuse brain volume loss greater than typical for age consistent with   Central cerebral hemispheric white matter volume loss    ALBERT TONY MD; Attending Radiologist  This document has been electronically signed. Nov 25 2022  4:52PM      ASSESSMENT AND PLAN:    seen for ams-multifactorial     Subacute left lateral convexity(temporo occipital) subdural hematoma  left parafalcine subacute sdh  ME  hx of TBI/SDH  FEVER;AP    waiting for bed at [facility] for possible surgical intervention;EEG  management dw dr kirkpatrick and patient wife  avoid ac  antibiotic as per ID  Physical therapy evaluation.  OOB to chair/ambulation with assistance only  Pain is accessed and addressed.  Would continue to follow.

## 2022-11-28 NOTE — SWALLOW VFSS/MBS ASSESSMENT ADULT - ORAL PHASE COMMENTS
Patient with suspected behavioral component/reduced cognition and presented with bolus hold for mildly thick and thin liquids, despite max cues. Patient presented with shaking of head no when verbally prompted to swallow. SLP utilized yankauer suctioning to safely remove bolus from oral cavity and patient was left NAD.

## 2022-11-28 NOTE — SWALLOW VFSS/MBS ASSESSMENT ADULT - DEMONSTRATES NEED FOR REFERRAL TO ANOTHER SERVICE
RD services to ensure adequate daily caloric nutritional intake; continued neurology work-up given reduced cognition/Registered Dietitian/neurology

## 2022-11-28 NOTE — SWALLOW VFSS/MBS ASSESSMENT ADULT - ADDITIONAL RECOMMENDATIONS
Discussed with Dr. Bhardwaj that patient was not agreeable to full assessment despite max encouragement/education with suspected behavioral component due to reduced cognition. As per discussion with MD, this department to sign off at this time. Discussed to please reconsult this department as the patient's cognition improves/as deemed medically appropriate and pending GOC and MD in agreement with POC. As per discussion with Dr. Lashae MD in agreement with abovementioned recommendations and discussed for this department to sign off at this time. Discussed to please reconsult this department as the patient's cognition improves/as deemed medically appropriate and pending GOC and MD in agreement with POC.

## 2022-11-28 NOTE — SWALLOW VFSS/MBS ASSESSMENT ADULT - DIAGNOSTIC IMPRESSIONS
1. Moderate oral dysphagia for pureed and regular solids marked by adequate oral acceptance with prolonged mastication/manipulation, intermittent bolus hold requiring verbal cues to prompt transport with delayed transport and A-P transit time. Spillover into the oropharynx was visualized. Trace-mild lingual and palatal stasis visualized, which reduced with subsequent swallow.  2. Mild pharyngeal dysphagia for pureed and regular solids marked by delayed pharyngeal swallow trigger with reduced base of tongue retraction, reduced hyolaryngeal elevation, adequate epiglottic deflection, and reduced pharyngeal contractility resulting in trace stasis at the BOT, vallecula (greater with regular solids), posterior pharyngeal wall, and pyriform sinuses, which reduced with subsequent swallow. No laryngeal penetration/aspiration was visualized.   3. Attempted to provide PO trials of mildly thick liquids x2 and thin liquids x2; however patient presented with bolus hold and absent anterior-posterior transport, despite max verbal and tactile cues provided by SLP in attempt to facilitate transport. SLP provided max encouragement and multiple reattempts and upon clinician questioning asking patient to swallow, the patient persistently shook his head no and was not agreeable to additional trials (with suspected behavioral component due to reduced cognition). SLP utilized yankauer suctioning to safely remove bolus from oral cavity. Of note, upon yankauer suctioning, posterior loss of thin liquids to the level of the pyriforms was visualized; however, when fluro was turned on no barium was visualized within the laryngeal vestibule. Patient was left in NAD upon completion of assessment. 1. Moderate oral dysphagia for pureed and regular solids marked by adequate oral acceptance with prolonged mastication/manipulation, intermittent bolus hold requiring verbal cues to prompt transport with delayed transport and A-P transit time. Spillover into the oropharynx was visualized. Trace-mild lingual and palatal stasis visualized, which reduced with subsequent swallow.  2. Mild pharyngeal dysphagia for pureed and regular solids marked by delayed pharyngeal swallow trigger with reduced base of tongue retraction, reduced hyolaryngeal elevation, adequate epiglottic deflection, and reduced pharyngeal contractility resulting in trace stasis at the BOT, vallecula (greater with regular solids), posterior pharyngeal wall, and pyriform sinuses, which reduced with subsequent swallow. No laryngeal penetration/aspiration was visualized.   3. Attempted to provide PO trials of mildly thick liquids x2 and thin liquids x2; however patient presented with bolus hold and absent anterior-posterior transport, despite max verbal and tactile cues provided by SLP in attempt to facilitate transport. SLP provided max encouragement and multiple reattempts and upon clinician questioning asking patient to swallow, the patient persistently shook his head no and was not agreeable to additional trials (with suspected behavioral component due to reduced cognition). When patient was asked to expectorate bolus, patient presented with tight labial seal and shaking of head no. SLP therefore utilized yankauer suctioning to safely remove bolus from oral cavity. Of note, upon yankauer suctioning, posterior loss of thin liquids to the level of the pyriforms was visualized; however, when fluro was turned on no barium was visualized within the laryngeal vestibule.   4. It should be noted that due to the patient's inconsistent ability to trigger a pharyngeal swallow, despite max cues, the patient is at an increased aspiration/choking risk. Of note, Patient was left in NAD upon completion of assessment.

## 2022-11-28 NOTE — PATIENT PROFILE ADULT - VISION (WITH CORRECTIVE LENSES IF THE PATIENT USUALLY WEARS THEM):
Partially impaired: cannot see medication labels or newsprint, but can see obstacles in path, and the surrounding layout; can count fingers at arm's length shortness of breath x today, hx of pulmonary fibrosis and taking prednisone with no improvement.

## 2022-11-28 NOTE — SWALLOW VFSS/MBS ASSESSMENT ADULT - ORAL PHASE
Delayed oral transit time/Reduced anterior - posterior transport/Residue in oral cavity/Uncontrolled bolus / spillover in prince-pharynx absent anterior-posterior transport, despite max verbal/tactile cues provided by SLP

## 2022-11-28 NOTE — SWALLOW VFSS/MBS ASSESSMENT ADULT - ORAL PREPARATORY PHASE
Bolus hold requiring verbal cues/Poor bolus formation Bolus hold/Poor bolus formation bolus hold requiring verbal cues/Insufficient mastication/Poor bolus formation

## 2022-11-28 NOTE — PATIENT PROFILE ADULT - NSPROGENSOURCEINFO_GEN_A_NUR
profile of 10/19/22/patient/facility verbal report profile of 10/19/22/patient/facility verbal report/health record

## 2022-11-28 NOTE — PROGRESS NOTE ADULT - PROBLEM SELECTOR PLAN 1
Fever- Sepsis with Leukocytosis, Hypotension -Improving , Sepsis -RESOLVED   Suspec  possible Aspiration PNA   - 11/22 Blood x2 NGTD and urine culture Klebsiella (Zosyn sensative)  - 11/24 CXR: Right-sided infiltrate; aspiration PNA   - Continue Zosyn IVPB q 8 hrs  - Continue on mod thick liquids w/ aspiration precautions  - IVF D5 1/2 NS 80 ml/hr  - Tylenol 650 mg UT  Suppository   - ID Dr Boggs d/w -continue IV Zosyn IV q 8 hrs Fever- Sepsis with Leukocytosis, Hypotension -Improving , Sepsis -RESOLVED   Suspec  possible Aspiration PNA   - 11/22 Blood x2 NGTD and urine culture Klebsiella (Zosyn sensitive)  - 11/24 CXR: Right-sided infiltrate; aspiration PNA   - Continue Zosyn IVPB q 8 hrs, Change to PO Abx as per Dr Manning   - Continue on mod thick liquids w/ aspiration precautions  - IVF D5 1/2 NS 80 ml/hr  - Tylenol 650 mg ND  Suppository   - ID Dr Manning follow up

## 2022-11-28 NOTE — SWALLOW VFSS/MBS ASSESSMENT ADULT - RECOMMENDED CONSISTENCY
1. defer continuation of PO diet level to MD, as limited PO trials were provided due to the patient not agreeable to PO liquid trials   2. Recommend continued GOC discussion with the patient's family regarding nutritional plan of care  3. 1. Defer continuation of PO diet level to MD, as limited PO trials were provided due to the patient not accepting of PO liquid trials   2. Recommend continued GOC discussion with the patient's family regarding nutritional plan of care  3. Maintain aspiration precautions and strict oral care 1. Defer continuation of PO diet level to MD given today's limited assessment due to the patient's inability to consistently trigger a pharyngeal swallow, despite max cues, ultimately requiring yankauer suctioning to safely remove bolus from oral cavity, as abovementioned.   2. Recommend continued GOC discussion with the patient's family regarding nutritional plan of care.  3. Maintain aspiration precautions and strict oral care

## 2022-11-28 NOTE — SWALLOW VFSS/MBS ASSESSMENT ADULT - COMMENTS
Consult received and chart reviewed. As per discussion with Dr. Bhardwaj's Resident x [phone]) the patient is medically cleared for MBSS at this time. Patient received in the Radiology Suite this AM for a Modified Barium Swallow study, at which time he was alert, oriented x2, cooperative, and denied pain. Patient is familiar to this department and was last seen for bedside swallow assessment 11/27/22, at which time a pureed and moderately thick liquid diet level via teaspoon and MBSS were recommended (see report for details).     Per charting, hospitalist note 11/28/22: "11/28/22: Patient seen and examined at bedside. Pt more awake, able to respond with single words to some questions. Nods/shakes head otherwise. ROS unreliable, but pt denies fever/chills, lightheadedness/dizziness, cp/palp, sob/cough, abd pain, n/v, c/d. Pt still awaiting transfer to [facility], MBS planned today otherwise."     WBC WFL.  CXR 11/24/22 revealed    **MBSS COMPLETED/ Patient left NAD- FULL REPORT/RECOMMENDATIONS TO FOLLOW** Consult received and chart reviewed. As per discussion with Dr. Bhardwaj's Resident x [phone] the patient is medically cleared for MBSS at this time. Patient received in the Radiology Suite this AM for a Modified Barium Swallow study, at which time he was alert, oriented x2, cooperative, and denied pain. Patient is familiar to this department and was last seen for bedside swallow assessment 11/27/22, at which time a pureed and moderately thick liquid diet level via teaspoon and MBSS were recommended (see report for details).     Per charting, hospitalist note 11/28/22: "11/28/22: Patient seen and examined at bedside. Pt more awake, able to respond with single words to some questions. Nods/shakes head otherwise. ROS unreliable, but pt denies fever/chills, lightheadedness/dizziness, cp/palp, sob/cough, abd pain, n/v, c/d. Pt still awaiting transfer to [facility], MBS planned today otherwise."   Per GI note 11/27/22: "discussed nutrition with wife he does not aspirate at home and has had pna very infrequently monitor mental status consider starting pleasure feeds she is not interested in peg"    WBC WFL.  CXR 11/24/22 revealed "Right-sided infiltrate presently seen."  MR Head 11/25/22 revealed "1. Left lateral convexity subacute to long-standing subdural hematoma, unchanged from 11/22/2022, increased from 10/20/2022 2. Left posterior parafalcine subacute to long-standing subdural hematoma, unchanged since 10/20/2022 3. Left posterior convexity subarachnoid space hemorrhage (favored over artifact) 4. Right frontal and right temporal tip focal encephalomalacia consistent with posttraumatic etiology 5. Severe cerebral hemispheric white matter abnormality likely reflects both ischemia and posttraumatic injury 6. Diffuse brain volume loss greater than typical for age consistent with Central cerebral hemispheric white matter volume loss"    Discussed results and recommendations with the patient, Dr. Bhardwaj and Dr. Bhardwaj's Resident ([phone]).

## 2022-11-28 NOTE — PROGRESS NOTE ADULT - PROBLEM SELECTOR PLAN 3
Pt admitted from 10/19 to 10/22/2022 at [facility] for SDH s/p s/p andexxa  Off of AC.   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  Mild mass effect with sulcal effacement and partial compression of the  left lateral ventricle. No new acute intracranial hemorrhage.  - Neuro Dr. Main, recs appreciated  Plan for Transfer to [facility] for further Eval & EEG

## 2022-11-28 NOTE — PROGRESS NOTE ADULT - PROBLEM SELECTOR PLAN 2
Acute on chronic, per wife pt has been progressively deteriorating, reactive to painful stimulus only, prior used to articulate some words.  -Unclear Etiology  ? SDH AMS improving   - CT head showed chronic subdural hematomas overlying the left side of the posterior falx measuring up to 1.7 cm and 1.5 cm overlying the left temporal convexity.  Mild mass effect with sulcal effacement and partial compression of the  left lateral ventricle. No new acute intracranial hemorrhage.  - Neuro Dr. Main recs: AMS/SDH,   - Dr. Main spoke to wife- plan for transfer to [facility] for further management   - MRI done 11/25   - GI Dr. Salgado -NO PEG as per Family   - PT /OT consulted  - Palliative care consulted for GOC-DNR/DNI

## 2022-11-28 NOTE — PROGRESS NOTE ADULT - SUBJECTIVE AND OBJECTIVE BOX
Patient is a 72y old  Male who presents with a chief complaint of acute on chronic AMS (27 Nov 2022 14:56)    HPI:  72M BPH CAD, HTN, HLD, Afib, SDH s/p mechanical fall, recurrent ICH when on AC brought today for weakness. History obtained from pt's wife Love and chart review given patient medical condition. Pt admitted from 10/19 to 10/22/2022 at [facility] for SHD s/p adexxa. Pt's wife endorses that patient has been progressively deteriorating, states that now only is reactive to painful stimulus, prior used to articulate some words. Patient completed 2 weeks of outpatient PT, has 8-10 hours nurse aid daily. Temp at home 99.9. Wife assist with feeding, was seen by neuro who start mirtazapine and sent for neurologic work up.     ED course  VS: Afebrile, HR: 96, BP: 154/97, SpO2: 97% RA RR 19  Labs significant for glucose 227, Alkaline phosphate 227, UA shows glucose 250  CT head showed chronic subdural hematomas overlying the left side of the posterior falx measuring up to 1.7 cm and 1.5 cm overlying the left temporal convexity.  Mild mass effect with sulcal effacement and partial compression of the  left lateral ventricle. No new acute intracranial hemorrhage.  EKG shows Afib with RVR @ 110 bpm, Right axis deviation    (23 Nov 2022 01:11)    INTERVAL HPI:  11/24/22: pt seen, examined , Pt had fever 103.8 & 101 early this AM , Afebrile Now, ON IV fluids, IV Bolus given, Leukocytosis ,Started on IV Abx Zosyn, wife at bedside, ID  Dr Boggs called , Hypokalemia - IV K replaced. Remeron d/c.    11/25/22: Patient seen and examined at bedside. Pt following some simple commands (move hands, squeeze finger), otherwise unresponsive to speech and unable to cooperate in AM. Reevaluated x2 in AM by speech, started on moderately thick liquids. WBC improving, replace K     11/26/22: Patient seen and examined at bedside. Pt asleep during exam. Did not open eyes but nodded "yes" when I said his name. Potassium repleted. Wbc continues to improve. Hb dropped from 11.4 to 105. Plan to transfer to [facility] for further management. On IV Abx, WBC Improving     11/27/22: Patient seen and examined at bedside. Pt somewhat cooperative with exam. ROS unreliable. COVID negative. Plan to transfer to [facility]. WBC wnl.    11/28/22: Patient seen and examined at bedside. Pt more awake, able to respond with single words to some questions. Nods/shakes head otherwise. ROS unreliable, but pt denies fever/chills, lightheadedness/dizziness, cp/palp, sob/cough, abd pain, n/v, c/d. Pt still awaiting transfer to [facility], MBS planned today otherwise.      OVERNIGHT EVENTS: NONE    Home Medications:  Dextrose 5% with 0.45% NaCl intravenous solution: 1000 milliliter(s) intravenous every 24 hours (27 Nov 2022 11:34)  gabapentin 400 mg oral capsule: 1 cap(s) orally 3 times a day (23 Nov 2022 02:49)  hydrALAZINE 25 mg oral tablet: 2 tab(s) orally every 8 hours (23 Nov 2022 02:49)  latanoprost 0.005% ophthalmic solution: 1 drop(s) to each affected eye once a day (in the evening) (23 Nov 2022 02:49)  methylphenidate 10 mg oral tablet: 2 pills at 8AM; 1 pill at 1PM (23 Nov 2022 02:49)  mirtazapine 7.5 mg oral tablet: 1 tab(s) orally once a day (at bedtime) (23 Nov 2022 02:49)  piperacillin-tazobactam: 3.375 gram(s) in dextrose 5% 100mL intravenous every 8 hours (26 Nov 2022 13:03)  rosuvastatin 10 mg oral tablet: 1 tab(s) orally once a day (23 Nov 2022 02:49)      MEDICATIONS  (STANDING):  dextrose 5% + sodium chloride 0.45% with potassium chloride 20 mEq/L 1000 milliLiter(s) (80 mL/Hr) IV Continuous <Continuous>  dextrose 5%. 1000 milliLiter(s) (100 mL/Hr) IV Continuous <Continuous>  dextrose 5%. 1000 milliLiter(s) (50 mL/Hr) IV Continuous <Continuous>  dextrose 50% Injectable 25 Gram(s) IV Push once  dextrose 50% Injectable 12.5 Gram(s) IV Push once  dextrose 50% Injectable 25 Gram(s) IV Push once  diltiazem    milliGRAM(s) Oral daily  gabapentin 400 milliGRAM(s) Oral three times a day  glucagon  Injectable 1 milliGRAM(s) IntraMuscular once  insulin lispro (ADMELOG) corrective regimen sliding scale   SubCutaneous every 6 hours  latanoprost 0.005% Ophthalmic Solution 1 Drop(s) Both EYES at bedtime  methylphenidate 20 milliGRAM(s) Oral daily  methylphenidate 10 milliGRAM(s) Oral daily  piperacillin/tazobactam IVPB.. 3.375 Gram(s) IV Intermittent every 8 hours    MEDICATIONS  (PRN):  acetaminophen  Suppository .. 650 milliGRAM(s) Rectal every 6 hours PRN Temp greater or equal to 38C (100.4F)  aluminum hydroxide/magnesium hydroxide/simethicone Suspension 30 milliLiter(s) Oral every 4 hours PRN Dyspepsia  dextrose Oral Gel 15 Gram(s) Oral once PRN Blood Glucose LESS THAN 70 milliGRAM(s)/deciliter  ondansetron Injectable 4 milliGRAM(s) IV Push every 8 hours PRN Nausea and/or Vomiting      Allergies    No Known Allergies    Intolerances        Social History:  Tobacco: denies   EtOH: denies   Recreational drug use: denies   Lives with: wife   Ambulates: bedbound, has a nurse aid 8-10 daily   Vaccinations: Fully COVID-19 vaccinated (23 Nov 2022 01:11)      REVIEW OF SYSTEMS:  unable to accurately obtain due to patient condition.      Vital Signs Last 24 Hrs  T(C): 36.4 (28 Nov 2022 04:47), Max: 36.8 (27 Nov 2022 13:09)  T(F): 97.6 (28 Nov 2022 04:47), Max: 98.2 (27 Nov 2022 13:09)  HR: 84 (28 Nov 2022 04:47) (84 - 97)  BP: 149/96 (28 Nov 2022 04:47) (148/82 - 161/98)  BP(mean): --  RR: 18 (28 Nov 2022 04:47) (16 - 18)  SpO2: 95% (28 Nov 2022 04:47) (92% - 95%)    Parameters below as of 28 Nov 2022 04:47  Patient On (Oxygen Delivery Method): room air      Finger Stick        11-27 @ 07:01  -  11-28 @ 07:00  --------------------------------------------------------  IN: 2218 mL / OUT: 0 mL / NET: 2218 mL    11-28 @ 07:01  - 11-28 @ 09:55  --------------------------------------------------------  IN: 450 mL / OUT: 0 mL / NET: 450 mL    PHYSICAL EXAM: awake, smiles , ~non verbal besides single words occationally  GENERAL:  [ x ] NAD , [ x ] well appearing, [  ] Agitated, [x  ] Drowsy,  [x  ] Lethargy, [  ] confused   HEAD:  [ x ] Normal, [  ] Other  EYES:  Unable to examine, pt is uncooperative on exam  ENMT:  [ x ] Normal, [x  ] Dry  mucous membranes, [ x ] Good dentition, [ x ] No Thrush  NECK:  [ x ] Supple, [ x ] No JVD, [ x ] Normal thyroid, [  ] Lymphadenopathy [  ] Other  CHEST/LUNG:  [ x ] Clear to auscultation bilaterally, [ x ] Breath Sounds equal B/L / Decrease, [x  ] poor effort  [ x ] No rales, [ x ] No rhonchi  [x  ]  No wheezing,   HEART:  [ x ] Regular rate and rhythm, [  ] tachycardia, [  ] Bradycardia,  [  ] irregular  [ x ] No murmurs, No rubs, No gallops, [  ] PPM in place (Mfr:  )  ABDOMEN: [ x ] Soft, [ x ] Nondistended, [ x ] No mass, [x  ] Bowel sounds present, [x  ] obese  NERVOUS SYSTEM:  [  ] Alert & Oriented x , [ x ] Nonfocal  [  ] Confusion  [  ] Encephalopathic [  ] Sedated [x  ] Unable to assess- asleep during exam, did not wake up [  ] Dementia   EXTREMITIES: [ x ] No clubbing, No cyanosis,  [  ] edema B/L lower EXT. [ x ] PVD stasis skin changes B/L Lower EXT, [  ] wound + Lower Ext pain -  LYMPH: No lymphadenopathy noted  SKIN:  [  ] No rashes or lesions, [  ] Pressure Ulcers, [  ] ecchymosis, [  ] Skin Tears, [x  ] Other- dry skin, with Scabs all EXT      DIET: Diet, Pureed:   Moderately Thick Liquids (MODTHICKLIQS)  Supplement Feeding Modality:  Oral  Ensure Pudding Cans or Servings Per Day:  1       Frequency:  Three Times a day (11-27-22 @ 11:22)      LABS:                        10.3   7.13  )-----------( 190      ( 28 Nov 2022 08:25 )             31.3     28 Nov 2022 08:25    143    |  111    |  6      ----------------------------<  138    3.4     |  24     |  0.90     Ca    8.0        28 Nov 2022 08:25  Phos  3.8       28 Nov 2022 08:25  Mg     1.7       28 Nov 2022 08:25    TPro  5.3    /  Alb  1.9    /  TBili  0.6    /  DBili  x      /  AST  36     /  ALT  44     /  AlkPhos  93     27 Nov 2022 10:10          Culture Results:   >100,000 CFU/ml Klebsiella pneumoniae (11-24 @ 08:26)  Culture Results:   No growth to date. (11-24 @ 06:40)  Culture Results:   No growth to date. (11-24 @ 06:37)  Culture Results:   No Growth Final (11-22 @ 22:10)  Culture Results:   <10,000 CFU/mL Normal Urogenital Angle (11-22 @ 22:10)  Culture Results:   No Growth Final (11-22 @ 22:05)                  Culture - Urine (collected 24 Nov 2022 08:26)  Source: Catheterized Catheterized  Final Report (27 Nov 2022 16:42):    >100,000 CFU/ml Klebsiella pneumoniae  Organism: Klebsiella pneumoniae (27 Nov 2022 16:42)  Organism: Klebsiella pneumoniae (27 Nov 2022 16:42)    Culture - Blood (collected 24 Nov 2022 06:40)  Source: .Blood Blood-Peripheral  Preliminary Report (25 Nov 2022 14:01):    No growth to date.    Culture - Blood (collected 24 Nov 2022 06:37)  Source: .Blood Blood-Peripheral  Preliminary Report (25 Nov 2022 14:01):    No growth to date.    Culture - Urine (collected 22 Nov 2022 22:10)  Source: Catheterized Catheterized  Final Report (24 Nov 2022 07:27):    <10,000 CFU/mL Normal Urogenital Angle    Culture - Blood (collected 22 Nov 2022 22:10)  Source: .Blood Blood-Venous  Final Report (28 Nov 2022 02:01):    No Growth Final    Culture - Blood (collected 22 Nov 2022 22:05)  Source: .Blood Blood-Venous  Final Report (28 Nov 2022 02:01):    No Growth Final         Anemia Panel:  Vitamin B12, Serum: 656 pg/mL (11-24-22 @ 06:37)      Thyroid Panel:                RADIOLOGY & ADDITIONAL TESTS:      HEALTH ISSUES - PROBLEM Dx:  AMS (altered mental status)    HTN (hypertension)  You have a known history of hypertension, the medical term for high blood pressure. Untreated high blood pressure increases the strain on the heart and arteries, eventually causing organ damage. High blood pressure also increases the risk of heart failure, heart attack (myocardial infarction), stroke, and kidney failure.   - Continue to take your home medications to prevent the possible complications of uncontrolled hypertension.   - Please also follow up with your primary care physician on a regular basis to make sure your blood pressure continues to be well controlled.      Motor system disease    Peripheral neuropathy    History of diabetes mellitus    Major depression    Need for prophylactic measure    Fever    Dysphagia            Consultant(s) Notes Reviewed:  [  ] YES     Care Discussed with [X] Consultants  [ x ] Patient  [  ] Family [  ] HCP [  ]   [  ] Social Service  [x  ] RN, [  ] Physical Therapy,[  ] Palliative care team  DVT PPX: [  ] Lovenox, [  ] S C Heparin, [  ] Coumadin, [  ] Xarelto, [  ] Eliquis, [  ] Pradaxa, [  ] IV Heparin drip, [  ] SCD [  ] Contraindication 2 to GI Bleed,[  ] Ambulation [  ] Contraindicated 2 to  bleed [x] Contraindicated 2 to Brain Bleed  Advanced directive: [  ] None, [ x ] DNR/DNI   Patient is a 72y old  Male who presents with a chief complaint of acute on chronic AMS (27 Nov 2022 14:56)    HPI:  72M BPH CAD, HTN, HLD, Afib, SDH s/p mechanical fall, recurrent ICH when on AC brought today for weakness. History obtained from pt's wife Love and chart review given patient medical condition. Pt admitted from 10/19 to 10/22/2022 at [facility] for SHD s/p adexxa. Pt's wife endorses that patient has been progressively deteriorating, states that now only is reactive to painful stimulus, prior used to articulate some words. Patient completed 2 weeks of outpatient PT, has 8-10 hours nurse aid daily. Temp at home 99.9. Wife assist with feeding, was seen by neuro who start mirtazapine and sent for neurologic work up.     ED course  VS: Afebrile, HR: 96, BP: 154/97, SpO2: 97% RA RR 19  Labs significant for glucose 227, Alkaline phosphate 227, UA shows glucose 250  CT head showed chronic subdural hematomas overlying the left side of the posterior falx measuring up to 1.7 cm and 1.5 cm overlying the left temporal convexity.  Mild mass effect with sulcal effacement and partial compression of the  left lateral ventricle. No new acute intracranial hemorrhage.  EKG shows Afib with RVR @ 110 bpm, Right axis deviation    (23 Nov 2022 01:11)    INTERVAL HPI:  11/24/22: pt seen, examined , Pt had fever 103.8 & 101 early this AM , Afebrile Now, ON IV fluids, IV Bolus given, Leukocytosis ,Started on IV Abx Zosyn, wife at bedside, ID  Dr Boggs called , Hypokalemia - IV K replaced. Remeron d/c.    11/25/22: Patient seen and examined at bedside. Pt following some simple commands (move hands, squeeze finger), otherwise unresponsive to speech and unable to cooperate in AM. Reevaluated x2 in AM by speech, started on moderately thick liquids. WBC improving, replace K     11/26/22: Patient seen and examined at bedside. Pt asleep during exam. Did not open eyes but nodded "yes" when I said his name. Potassium repleted. Wbc continues to improve. Hb dropped from 11.4 to 105. Plan to transfer to [facility] for further management. On IV Abx, WBC Improving     11/27/22: Patient seen and examined at bedside. Pt somewhat cooperative with exam. ROS unreliable. COVID negative. Plan to transfer to [facility]. WBC wnl.    11/28/22: Patient seen and examined at bedside. Pt more awake, able to respond with single words to some questions. Nods/shakes head otherwise. ROS unreliable, but pt denies fever/chills, lightheadedness/dizziness, cp/palp, sob/cough, abd pain, n/v, c/d. Pt still awaiting transfer to [facility], MBS planned today otherwise.IV Abx ---> PO Abx       OVERNIGHT EVENTS: NONE    Home Medications:  Dextrose 5% with 0.45% NaCl intravenous solution: 1000 milliliter(s) intravenous every 24 hours (27 Nov 2022 11:34)  gabapentin 400 mg oral capsule: 1 cap(s) orally 3 times a day (23 Nov 2022 02:49)  hydrALAZINE 25 mg oral tablet: 2 tab(s) orally every 8 hours (23 Nov 2022 02:49)  latanoprost 0.005% ophthalmic solution: 1 drop(s) to each affected eye once a day (in the evening) (23 Nov 2022 02:49)  methylphenidate 10 mg oral tablet: 2 pills at 8AM; 1 pill at 1PM (23 Nov 2022 02:49)  mirtazapine 7.5 mg oral tablet: 1 tab(s) orally once a day (at bedtime) (23 Nov 2022 02:49)  piperacillin-tazobactam: 3.375 gram(s) in dextrose 5% 100mL intravenous every 8 hours (26 Nov 2022 13:03)  rosuvastatin 10 mg oral tablet: 1 tab(s) orally once a day (23 Nov 2022 02:49)      MEDICATIONS  (STANDING):  dextrose 5% + sodium chloride 0.45% with potassium chloride 20 mEq/L 1000 milliLiter(s) (80 mL/Hr) IV Continuous <Continuous>  dextrose 5%. 1000 milliLiter(s) (100 mL/Hr) IV Continuous <Continuous>  dextrose 5%. 1000 milliLiter(s) (50 mL/Hr) IV Continuous <Continuous>  dextrose 50% Injectable 25 Gram(s) IV Push once  dextrose 50% Injectable 12.5 Gram(s) IV Push once  dextrose 50% Injectable 25 Gram(s) IV Push once  diltiazem    milliGRAM(s) Oral daily  gabapentin 400 milliGRAM(s) Oral three times a day  glucagon  Injectable 1 milliGRAM(s) IntraMuscular once  insulin lispro (ADMELOG) corrective regimen sliding scale   SubCutaneous every 6 hours  latanoprost 0.005% Ophthalmic Solution 1 Drop(s) Both EYES at bedtime  methylphenidate 20 milliGRAM(s) Oral daily  methylphenidate 10 milliGRAM(s) Oral daily  piperacillin/tazobactam IVPB.. 3.375 Gram(s) IV Intermittent every 8 hours    MEDICATIONS  (PRN):  acetaminophen  Suppository .. 650 milliGRAM(s) Rectal every 6 hours PRN Temp greater or equal to 38C (100.4F)  aluminum hydroxide/magnesium hydroxide/simethicone Suspension 30 milliLiter(s) Oral every 4 hours PRN Dyspepsia  dextrose Oral Gel 15 Gram(s) Oral once PRN Blood Glucose LESS THAN 70 milliGRAM(s)/deciliter  ondansetron Injectable 4 milliGRAM(s) IV Push every 8 hours PRN Nausea and/or Vomiting      Allergies    No Known Allergies    Intolerances        Social History:  Tobacco: denies   EtOH: denies   Recreational drug use: denies   Lives with: wife   Ambulates: bedbound, has a nurse aid 8-10 daily   Vaccinations: Fully COVID-19 vaccinated (23 Nov 2022 01:11)      REVIEW OF SYSTEMS:  unable to accurately obtain due to patient condition.      Vital Signs Last 24 Hrs  T(C): 36.4 (28 Nov 2022 04:47), Max: 36.8 (27 Nov 2022 13:09)  T(F): 97.6 (28 Nov 2022 04:47), Max: 98.2 (27 Nov 2022 13:09)  HR: 84 (28 Nov 2022 04:47) (84 - 97)  BP: 149/96 (28 Nov 2022 04:47) (148/82 - 161/98)  BP(mean): --  RR: 18 (28 Nov 2022 04:47) (16 - 18)  SpO2: 95% (28 Nov 2022 04:47) (92% - 95%)    Parameters below as of 28 Nov 2022 04:47  Patient On (Oxygen Delivery Method): room air      Finger Stick        11-27 @ 07:01 - 11-28 @ 07:00  --------------------------------------------------------  IN: 2218 mL / OUT: 0 mL / NET: 2218 mL    11-28 @ 07:01 - 11-28 @ 09:55  --------------------------------------------------------  IN: 450 mL / OUT: 0 mL / NET: 450 mL    PHYSICAL EXAM: awake, smiles , ~non verbal besides single words occationally  GENERAL:  [ x ] NAD , [ x ] well appearing, [  ] Agitated, [x  ] Drowsy,  [x  ] Lethargy, [  ] confused   HEAD:  [ x ] Normal, [  ] Other  EYES:  Unable to examine, pt is uncooperative on exam  ENMT:  [ x ] Normal, [x  ] Dry  mucous membranes, [ x ] Good dentition, [ x ] No Thrush  NECK:  [ x ] Supple, [ x ] No JVD, [ x ] Normal thyroid, [  ] Lymphadenopathy [  ] Other  CHEST/LUNG:  [ x ] Clear to auscultation bilaterally, [ x ] Breath Sounds equal B/L / Decrease, [x  ] poor effort  [ x ] No rales, [ x ] No rhonchi  [x  ]  No wheezing,   HEART:  [ x ] Regular rate and rhythm, [  ] tachycardia, [  ] Bradycardia,  [  ] irregular  [ x ] No murmurs, No rubs, No gallops, [  ] PPM in place (Mfr:  )  ABDOMEN: [ x ] Soft, [ x ] Nondistended, [ x ] No mass, [x  ] Bowel sounds present, [x  ] obese  NERVOUS SYSTEM:  [  ] Alert & Oriented x , [ x ] Nonfocal  [  ] Confusion  [  ] Encephalopathic [  ] Sedated [x  ] Unable to assess- asleep during exam, did not wake up [  ] Dementia   EXTREMITIES: [ x ] No clubbing, No cyanosis,  [  ] edema B/L lower EXT. [ x ] PVD stasis skin changes B/L Lower EXT, [  ] wound + Lower Ext pain -  LYMPH: No lymphadenopathy noted  SKIN:  [  ] No rashes or lesions, [  ] Pressure Ulcers, [  ] ecchymosis, [  ] Skin Tears, [x  ] Other- dry skin, with Scabs all EXT      DIET: Diet, Pureed:   Moderately Thick Liquids (MODTHICKLIQS)  Supplement Feeding Modality:  Oral  Ensure Pudding Cans or Servings Per Day:  1       Frequency:  Three Times a day (11-27-22 @ 11:22)      LABS:                        10.3   7.13  )-----------( 190      ( 28 Nov 2022 08:25 )             31.3     28 Nov 2022 08:25    143    |  111    |  6      ----------------------------<  138    3.4     |  24     |  0.90     Ca    8.0        28 Nov 2022 08:25  Phos  3.8       28 Nov 2022 08:25  Mg     1.7       28 Nov 2022 08:25    TPro  5.3    /  Alb  1.9    /  TBili  0.6    /  DBili  x      /  AST  36     /  ALT  44     /  AlkPhos  93     27 Nov 2022 10:10          Culture Results:   >100,000 CFU/ml Klebsiella pneumoniae (11-24 @ 08:26)  Culture Results:   No growth to date. (11-24 @ 06:40)  Culture Results:   No growth to date. (11-24 @ 06:37)  Culture Results:   No Growth Final (11-22 @ 22:10)  Culture Results:   <10,000 CFU/mL Normal Urogenital Angle (11-22 @ 22:10)  Culture Results:   No Growth Final (11-22 @ 22:05)    Culture - Urine (collected 24 Nov 2022 08:26)  Source: Catheterized Catheterized  Final Report (27 Nov 2022 16:42):    >100,000 CFU/ml Klebsiella pneumoniae  Organism: Klebsiella pneumoniae (27 Nov 2022 16:42)  Organism: Klebsiella pneumoniae (27 Nov 2022 16:42)    Culture - Blood (collected 24 Nov 2022 06:40)  Source: .Blood Blood-Peripheral  Preliminary Report (25 Nov 2022 14:01):    No growth to date.    Culture - Blood (collected 24 Nov 2022 06:37)  Source: .Blood Blood-Peripheral  Preliminary Report (25 Nov 2022 14:01):    No growth to date.    Culture - Urine (collected 22 Nov 2022 22:10)  Source: Catheterized Catheterized  Final Report (24 Nov 2022 07:27):    <10,000 CFU/mL Normal Urogenital Angle    Culture - Blood (collected 22 Nov 2022 22:10)  Source: .Blood Blood-Venous  Final Report (28 Nov 2022 02:01):    No Growth Final    Culture - Blood (collected 22 Nov 2022 22:05)  Source: .Blood Blood-Venous  Final Report (28 Nov 2022 02:01):    No Growth Final         Anemia Panel:  Vitamin B12, Serum: 656 pg/mL (11-24-22 @ 06:37)      Thyroid Panel:    RADIOLOGY & ADDITIONAL TESTS:   NONE    HEALTH ISSUES - PROBLEM Dx:  AMS (altered mental status)    HTN (hypertension)  You have a known history of hypertension, the medical term for high blood pressure. Untreated high blood pressure increases the strain on the heart and arteries, eventually causing organ damage. High blood pressure also increases the risk of heart failure, heart attack (myocardial infarction), stroke, and kidney failure.   - Continue to take your home medications to prevent the possible complications of uncontrolled hypertension.   - Please also follow up with your primary care physician on a regular basis to make sure your blood pressure continues to be well controlled.      Motor system disease    Peripheral neuropathy    History of diabetes mellitus    Major depression    Need for prophylactic measure    Fever    Dysphagia            Consultant(s) Notes Reviewed:  [x  ] YES     Care Discussed with [X] Consultants  [ x ] Patient  [x  ] Family [  ] HCP [  ]   [  ] Social Service  [x  ] RN, [  ] Physical Therapy,[  ] Palliative care team  DVT PPX: [  ] Lovenox, [  ] S C Heparin, [  ] Coumadin, [  ] Xarelto, [  ] Eliquis, [  ] Pradaxa, [  ] IV Heparin drip, [  ] SCD [  ] Contraindication 2 to GI Bleed,[  ] Ambulation [  ] Contraindicated 2 to  bleed [x] Contraindicated 2 to Brain Bleed  Advanced directive: [  ] None, [ x ] DNR/DNI

## 2022-11-28 NOTE — SWALLOW VFSS/MBS ASSESSMENT ADULT - SLP PERTINENT HISTORY OF CURRENT PROBLEM
Per charting, the patient is a "72 M BPH CAD, HTN, HLD, Afib, SDH s/p mechanical fall, recurrent ICH when on AC brought today for weakness. History obtained from pt's wife Love and chart review given patient medical condition. Pt admitted from 10/19 to 10/22/2022 at [facility] for SHD s/p adexxa. Pt's wife endorses that patient has been progressively deteriorating, states that now only is reactive to painful stimulus, prior used to articulate some words. Patient admitted for acute on chronic AMS."

## 2022-11-28 NOTE — PROGRESS NOTE ADULT - ATTENDING COMMENTS
72 M BPH CAD, HTN, HLD, Afib, SDH s/p mechanical fall, recurrent ICH when on AC brought today for weakness. History obtained from pt's wife Love and chart review given patient medical condition. Pt admitted from 10/19 to 10/22/2022 at [facility] for SHD s/p adexxa. Pt's wife endorses that patient has been progressively deteriorating, states that now only is reactive to painful stimulus, prior used to articulate some words. Patient admitted for acute on chronic AMS.  Pt seen, examined, case & care plan d/w pt, residents at [facility].  Neurology-DR Main follow up -Pt for Transfer to [facility] for Neuro surgical Eval DR GONZALEZ   -ID DR Manning -On IV Zosyn q 8 hrs --> Change to PO Abx   -GI-DR Kaye -NO PEG , S & S ---> MBS -partly done, Moderately Thicketed liquid soft & bite Size , Pt did NOT Cooperate with Liquid study   Palliative Care Eval -DNR/DNI .  D/W Wife at [facility]   - aspiration Precaution.  SCD   PT/OT follow up  Social service Eval.  Total care time 45 minutes.   Transfer pt to [facility] when bed available. wife aware.

## 2022-11-28 NOTE — PROGRESS NOTE ADULT - SUBJECTIVE AND OBJECTIVE BOX
[facility]  Lorenzo Adam PA-C  [address] [place] [address]  [phone]      INTERVAL HPI/OVERNIGHT EVENTS:  Pt s/e  MBS noted  No new Gi events    MEDICATIONS  (STANDING):  dextrose 5% + sodium chloride 0.45% with potassium chloride 20 mEq/L 1000 milliLiter(s) (80 mL/Hr) IV Continuous <Continuous>  dextrose 5%. 1000 milliLiter(s) (100 mL/Hr) IV Continuous <Continuous>  dextrose 5%. 1000 milliLiter(s) (50 mL/Hr) IV Continuous <Continuous>  dextrose 50% Injectable 25 Gram(s) IV Push once  dextrose 50% Injectable 12.5 Gram(s) IV Push once  dextrose 50% Injectable 25 Gram(s) IV Push once  diltiazem    milliGRAM(s) Oral daily  gabapentin 400 milliGRAM(s) Oral three times a day  glucagon  Injectable 1 milliGRAM(s) IntraMuscular once  insulin lispro (ADMELOG) corrective regimen sliding scale   SubCutaneous every 6 hours  latanoprost 0.005% Ophthalmic Solution 1 Drop(s) Both EYES at bedtime  magnesium sulfate  IVPB 1 Gram(s) IV Intermittent once  methylphenidate 20 milliGRAM(s) Oral daily  methylphenidate 10 milliGRAM(s) Oral daily  piperacillin/tazobactam IVPB.. 3.375 Gram(s) IV Intermittent every 8 hours    MEDICATIONS  (PRN):  acetaminophen  Suppository .. 650 milliGRAM(s) Rectal every 6 hours PRN Temp greater or equal to 38C (100.4F)  aluminum hydroxide/magnesium hydroxide/simethicone Suspension 30 milliLiter(s) Oral every 4 hours PRN Dyspepsia  dextrose Oral Gel 15 Gram(s) Oral once PRN Blood Glucose LESS THAN 70 milliGRAM(s)/deciliter  ondansetron Injectable 4 milliGRAM(s) IV Push every 8 hours PRN Nausea and/or Vomiting      Allergies    No Known Allergies    PHYSICAL EXAM:   Vital Signs:  Vital Signs Last 24 Hrs  T(C): 36.4 (28 Nov 2022 04:47), Max: 36.8 (27 Nov 2022 13:09)  T(F): 97.6 (28 Nov 2022 04:47), Max: 98.2 (27 Nov 2022 13:09)  HR: 84 (28 Nov 2022 04:47) (84 - 97)  BP: 149/96 (28 Nov 2022 04:47) (148/82 - 161/98)  BP(mean): --  RR: 18 (28 Nov 2022 04:47) (16 - 18)  SpO2: 95% (28 Nov 2022 04:47) (92% - 95%)    Parameters below as of 28 Nov 2022 04:47  Patient On (Oxygen Delivery Method): room air    GENERAL:  Appears stated age  ABDOMEN:  Soft, non-tender, non-distended  NEURO:  Alert    LABS:                        10.3   7.13  )-----------( 190      ( 28 Nov 2022 08:25 )             31.3     11-28    143  |  111<H>  |  6<L>  ----------------------------<  138<H>  3.4<L>   |  24  |  0.90    Ca    8.0<L>      28 Nov 2022 08:25  Phos  3.8     11-28  Mg     1.7     11-28    TPro  5.3<L>  /  Alb  1.9<L>  /  TBili  0.6  /  DBili  x   /  AST  36  /  ALT  44  /  AlkPhos  93  11-27

## 2022-11-29 ENCOUNTER — INPATIENT (INPATIENT)
Facility: HOSPITAL | Age: 72
LOS: 7 days | Discharge: HOME HEALTH | DRG: 85 | End: 2022-12-07
Attending: HOSPITALIST | Admitting: NEUROLOGICAL SURGERY
Payer: COMMERCIAL

## 2022-11-29 VITALS
TEMPERATURE: 98 F | HEART RATE: 71 BPM | OXYGEN SATURATION: 95 % | RESPIRATION RATE: 18 BRPM | SYSTOLIC BLOOD PRESSURE: 126 MMHG | DIASTOLIC BLOOD PRESSURE: 83 MMHG

## 2022-11-29 VITALS
RESPIRATION RATE: 18 BRPM | SYSTOLIC BLOOD PRESSURE: 152 MMHG | DIASTOLIC BLOOD PRESSURE: 100 MMHG | OXYGEN SATURATION: 94 % | TEMPERATURE: 98 F | HEART RATE: 73 BPM

## 2022-11-29 DIAGNOSIS — S06.5X0A TRAUMATIC SUBDURAL HEMORRHAGE WITHOUT LOSS OF CONSCIOUSNESS, INITIAL ENCOUNTER: ICD-10-CM

## 2022-11-29 PROBLEM — S06.9X9A UNSPECIFIED INTRACRANIAL INJURY WITH LOSS OF CONSCIOUSNESS OF UNSPECIFIED DURATION, INITIAL ENCOUNTER: Chronic | Status: ACTIVE | Noted: 2022-06-14

## 2022-11-29 PROBLEM — I61.9 NONTRAUMATIC INTRACEREBRAL HEMORRHAGE, UNSPECIFIED: Chronic | Status: ACTIVE | Noted: 2022-11-23

## 2022-11-29 LAB
ALBUMIN SERPL ELPH-MCNC: 2.1 G/DL — LOW (ref 3.3–5)
ALP SERPL-CCNC: 86 U/L — SIGNIFICANT CHANGE UP (ref 40–120)
ALT FLD-CCNC: 46 U/L — SIGNIFICANT CHANGE UP (ref 12–78)
ANION GAP SERPL CALC-SCNC: 4 MMOL/L — LOW (ref 5–17)
ANION GAP SERPL CALC-SCNC: 6 MMOL/L — SIGNIFICANT CHANGE UP (ref 5–17)
AST SERPL-CCNC: 21 U/L — SIGNIFICANT CHANGE UP (ref 15–37)
BASOPHILS # BLD AUTO: 0.09 K/UL — SIGNIFICANT CHANGE UP (ref 0–0.2)
BASOPHILS NFR BLD AUTO: 1 % — SIGNIFICANT CHANGE UP (ref 0–2)
BILIRUB SERPL-MCNC: 0.5 MG/DL — SIGNIFICANT CHANGE UP (ref 0.2–1.2)
BUN SERPL-MCNC: 4 MG/DL — LOW (ref 7–23)
BUN SERPL-MCNC: 4 MG/DL — LOW (ref 7–23)
CALCIUM SERPL-MCNC: 7.1 MG/DL — LOW (ref 8.5–10.1)
CALCIUM SERPL-MCNC: 8.3 MG/DL — LOW (ref 8.5–10.1)
CHLORIDE SERPL-SCNC: 108 MMOL/L — SIGNIFICANT CHANGE UP (ref 96–108)
CHLORIDE SERPL-SCNC: 108 MMOL/L — SIGNIFICANT CHANGE UP (ref 96–108)
CO2 SERPL-SCNC: 24 MMOL/L — SIGNIFICANT CHANGE UP (ref 22–31)
CO2 SERPL-SCNC: 28 MMOL/L — SIGNIFICANT CHANGE UP (ref 22–31)
CREAT SERPL-MCNC: 0.85 MG/DL — SIGNIFICANT CHANGE UP (ref 0.5–1.3)
CREAT SERPL-MCNC: 0.94 MG/DL — SIGNIFICANT CHANGE UP (ref 0.5–1.3)
CULTURE RESULTS: SIGNIFICANT CHANGE UP
CULTURE RESULTS: SIGNIFICANT CHANGE UP
EGFR: 87 ML/MIN/1.73M2 — SIGNIFICANT CHANGE UP
EGFR: 93 ML/MIN/1.73M2 — SIGNIFICANT CHANGE UP
EOSINOPHIL # BLD AUTO: 0.3 K/UL — SIGNIFICANT CHANGE UP (ref 0–0.5)
EOSINOPHIL NFR BLD AUTO: 3.5 % — SIGNIFICANT CHANGE UP (ref 0–6)
GLUCOSE BLDC GLUCOMTR-MCNC: 128 MG/DL — HIGH (ref 70–99)
GLUCOSE SERPL-MCNC: 256 MG/DL — HIGH (ref 70–99)
GLUCOSE SERPL-MCNC: 698 MG/DL — CRITICAL HIGH (ref 70–99)
HCT VFR BLD CALC: 32.7 % — LOW (ref 39–50)
HGB BLD-MCNC: 10.7 G/DL — LOW (ref 13–17)
IMM GRANULOCYTES NFR BLD AUTO: 7.2 % — HIGH (ref 0–0.9)
LYMPHOCYTES # BLD AUTO: 1.13 K/UL — SIGNIFICANT CHANGE UP (ref 1–3.3)
LYMPHOCYTES # BLD AUTO: 13.2 % — SIGNIFICANT CHANGE UP (ref 13–44)
MAGNESIUM SERPL-MCNC: 1.6 MG/DL — SIGNIFICANT CHANGE UP (ref 1.6–2.6)
MAGNESIUM SERPL-MCNC: 2 MG/DL — SIGNIFICANT CHANGE UP (ref 1.6–2.6)
MCHC RBC-ENTMCNC: 25.8 PG — LOW (ref 27–34)
MCHC RBC-ENTMCNC: 32.7 GM/DL — SIGNIFICANT CHANGE UP (ref 32–36)
MCV RBC AUTO: 79 FL — LOW (ref 80–100)
MONOCYTES # BLD AUTO: 0.89 K/UL — SIGNIFICANT CHANGE UP (ref 0–0.9)
MONOCYTES NFR BLD AUTO: 10.4 % — SIGNIFICANT CHANGE UP (ref 2–14)
NEUTROPHILS # BLD AUTO: 5.55 K/UL — SIGNIFICANT CHANGE UP (ref 1.8–7.4)
NEUTROPHILS NFR BLD AUTO: 64.7 % — SIGNIFICANT CHANGE UP (ref 43–77)
NRBC # BLD: 0 /100 WBCS — SIGNIFICANT CHANGE UP (ref 0–0)
PHOSPHATE SERPL-MCNC: 2 MG/DL — LOW (ref 2.5–4.5)
PHOSPHATE SERPL-MCNC: 2.2 MG/DL — LOW (ref 2.5–4.5)
PLATELET # BLD AUTO: 186 K/UL — SIGNIFICANT CHANGE UP (ref 150–400)
POTASSIUM SERPL-MCNC: 4 MMOL/L — SIGNIFICANT CHANGE UP (ref 3.5–5.3)
POTASSIUM SERPL-MCNC: 7.2 MMOL/L — CRITICAL HIGH (ref 3.5–5.3)
POTASSIUM SERPL-SCNC: 4 MMOL/L — SIGNIFICANT CHANGE UP (ref 3.5–5.3)
POTASSIUM SERPL-SCNC: 7.2 MMOL/L — CRITICAL HIGH (ref 3.5–5.3)
PROT SERPL-MCNC: 5.5 G/DL — LOW (ref 6–8.3)
RBC # BLD: 4.14 M/UL — LOW (ref 4.2–5.8)
RBC # FLD: 16 % — HIGH (ref 10.3–14.5)
SODIUM SERPL-SCNC: 136 MMOL/L — SIGNIFICANT CHANGE UP (ref 135–145)
SODIUM SERPL-SCNC: 142 MMOL/L — SIGNIFICANT CHANGE UP (ref 135–145)
SPECIMEN SOURCE: SIGNIFICANT CHANGE UP
SPECIMEN SOURCE: SIGNIFICANT CHANGE UP
WBC # BLD: 8.58 K/UL — SIGNIFICANT CHANGE UP (ref 3.8–10.5)
WBC # FLD AUTO: 8.58 K/UL — SIGNIFICANT CHANGE UP (ref 3.8–10.5)

## 2022-11-29 PROCEDURE — 80048 BASIC METABOLIC PNL TOTAL CA: CPT

## 2022-11-29 PROCEDURE — 87635 SARS-COV-2 COVID-19 AMP PRB: CPT

## 2022-11-29 PROCEDURE — 92610 EVALUATE SWALLOWING FUNCTION: CPT

## 2022-11-29 PROCEDURE — 97166 OT EVAL MOD COMPLEX 45 MIN: CPT

## 2022-11-29 PROCEDURE — 81001 URINALYSIS AUTO W/SCOPE: CPT

## 2022-11-29 PROCEDURE — 84100 ASSAY OF PHOSPHORUS: CPT

## 2022-11-29 PROCEDURE — 87086 URINE CULTURE/COLONY COUNT: CPT

## 2022-11-29 PROCEDURE — 83605 ASSAY OF LACTIC ACID: CPT

## 2022-11-29 PROCEDURE — 87040 BLOOD CULTURE FOR BACTERIA: CPT

## 2022-11-29 PROCEDURE — 70450 CT HEAD/BRAIN W/O DYE: CPT | Mod: 26

## 2022-11-29 PROCEDURE — 85730 THROMBOPLASTIN TIME PARTIAL: CPT

## 2022-11-29 PROCEDURE — 71045 X-RAY EXAM CHEST 1 VIEW: CPT

## 2022-11-29 PROCEDURE — 92526 ORAL FUNCTION THERAPY: CPT

## 2022-11-29 PROCEDURE — 87077 CULTURE AEROBIC IDENTIFY: CPT

## 2022-11-29 PROCEDURE — A9698: CPT

## 2022-11-29 PROCEDURE — 83735 ASSAY OF MAGNESIUM: CPT

## 2022-11-29 PROCEDURE — 87640 STAPH A DNA AMP PROBE: CPT

## 2022-11-29 PROCEDURE — 83036 HEMOGLOBIN GLYCOSYLATED A1C: CPT

## 2022-11-29 PROCEDURE — 99285 EMERGENCY DEPT VISIT HI MDM: CPT

## 2022-11-29 PROCEDURE — 70551 MRI BRAIN STEM W/O DYE: CPT

## 2022-11-29 PROCEDURE — 87641 MR-STAPH DNA AMP PROBE: CPT

## 2022-11-29 PROCEDURE — 87186 SC STD MICRODIL/AGAR DIL: CPT

## 2022-11-29 PROCEDURE — 85610 PROTHROMBIN TIME: CPT

## 2022-11-29 PROCEDURE — 82607 VITAMIN B-12: CPT

## 2022-11-29 PROCEDURE — 82962 GLUCOSE BLOOD TEST: CPT

## 2022-11-29 PROCEDURE — 85027 COMPLETE CBC AUTOMATED: CPT

## 2022-11-29 PROCEDURE — 74230 X-RAY XM SWLNG FUNCJ C+: CPT

## 2022-11-29 PROCEDURE — 85025 COMPLETE CBC W/AUTO DIFF WBC: CPT

## 2022-11-29 PROCEDURE — 93005 ELECTROCARDIOGRAM TRACING: CPT

## 2022-11-29 PROCEDURE — 36415 COLL VENOUS BLD VENIPUNCTURE: CPT

## 2022-11-29 PROCEDURE — 82140 ASSAY OF AMMONIA: CPT

## 2022-11-29 PROCEDURE — 80053 COMPREHEN METABOLIC PANEL: CPT

## 2022-11-29 PROCEDURE — 70450 CT HEAD/BRAIN W/O DYE: CPT | Mod: MA

## 2022-11-29 PROCEDURE — 97162 PT EVAL MOD COMPLEX 30 MIN: CPT

## 2022-11-29 PROCEDURE — 92611 MOTION FLUOROSCOPY/SWALLOW: CPT

## 2022-11-29 RX ORDER — LATANOPROST 0.05 MG/ML
1 SOLUTION/ DROPS OPHTHALMIC; TOPICAL AT BEDTIME
Refills: 0 | Status: DISCONTINUED | OUTPATIENT
Start: 2022-11-29 | End: 2022-12-07

## 2022-11-29 RX ORDER — ONDANSETRON 8 MG/1
4 TABLET, FILM COATED ORAL ONCE
Refills: 0 | Status: DISCONTINUED | OUTPATIENT
Start: 2022-11-29 | End: 2022-12-07

## 2022-11-29 RX ORDER — SODIUM CHLORIDE 9 MG/ML
1000 INJECTION, SOLUTION INTRAVENOUS
Refills: 0 | Status: DISCONTINUED | OUTPATIENT
Start: 2022-11-29 | End: 2022-12-07

## 2022-11-29 RX ORDER — DEXTROSE 50 % IN WATER 50 %
25 SYRINGE (ML) INTRAVENOUS ONCE
Refills: 0 | Status: DISCONTINUED | OUTPATIENT
Start: 2022-11-29 | End: 2022-12-07

## 2022-11-29 RX ORDER — ACETAMINOPHEN 500 MG
650 TABLET ORAL EVERY 6 HOURS
Refills: 0 | Status: DISCONTINUED | OUTPATIENT
Start: 2022-11-29 | End: 2022-12-07

## 2022-11-29 RX ORDER — POTASSIUM PHOSPHATE, MONOBASIC POTASSIUM PHOSPHATE, DIBASIC 236; 224 MG/ML; MG/ML
30 INJECTION, SOLUTION INTRAVENOUS ONCE
Refills: 0 | Status: DISCONTINUED | OUTPATIENT
Start: 2022-11-29 | End: 2022-11-29

## 2022-11-29 RX ORDER — INSULIN LISPRO 100/ML
VIAL (ML) SUBCUTANEOUS EVERY 6 HOURS
Refills: 0 | Status: DISCONTINUED | OUTPATIENT
Start: 2022-11-29 | End: 2022-11-30

## 2022-11-29 RX ORDER — SODIUM CHLORIDE 9 MG/ML
1000 INJECTION, SOLUTION INTRAVENOUS
Refills: 0 | Status: DISCONTINUED | OUTPATIENT
Start: 2022-11-29 | End: 2022-11-29

## 2022-11-29 RX ORDER — GLUCAGON INJECTION, SOLUTION 0.5 MG/.1ML
1 INJECTION, SOLUTION SUBCUTANEOUS ONCE
Refills: 0 | Status: DISCONTINUED | OUTPATIENT
Start: 2022-11-29 | End: 2022-12-07

## 2022-11-29 RX ORDER — GABAPENTIN 400 MG/1
400 CAPSULE ORAL THREE TIMES A DAY
Refills: 0 | Status: DISCONTINUED | OUTPATIENT
Start: 2022-11-29 | End: 2022-12-07

## 2022-11-29 RX ORDER — HYDRALAZINE HCL 50 MG
2 TABLET ORAL
Qty: 0 | Refills: 0 | DISCHARGE

## 2022-11-29 RX ORDER — CEFUROXIME AXETIL 250 MG
500 TABLET ORAL ONCE
Refills: 0 | Status: COMPLETED | OUTPATIENT
Start: 2022-11-29 | End: 2022-11-29

## 2022-11-29 RX ORDER — INSULIN LISPRO 100/ML
VIAL (ML) SUBCUTANEOUS AT BEDTIME
Refills: 0 | Status: DISCONTINUED | OUTPATIENT
Start: 2022-11-29 | End: 2022-11-30

## 2022-11-29 RX ORDER — METOPROLOL TARTRATE 50 MG
5 TABLET ORAL ONCE
Refills: 0 | Status: COMPLETED | OUTPATIENT
Start: 2022-11-29 | End: 2022-11-29

## 2022-11-29 RX ORDER — DEXTROSE 50 % IN WATER 50 %
15 SYRINGE (ML) INTRAVENOUS ONCE
Refills: 0 | Status: DISCONTINUED | OUTPATIENT
Start: 2022-11-29 | End: 2022-12-07

## 2022-11-29 RX ORDER — MAGNESIUM SULFATE 500 MG/ML
1 VIAL (ML) INJECTION ONCE
Refills: 0 | Status: COMPLETED | OUTPATIENT
Start: 2022-11-29 | End: 2022-11-29

## 2022-11-29 RX ORDER — DILTIAZEM HCL 120 MG
120 CAPSULE, EXT RELEASE 24 HR ORAL DAILY
Refills: 0 | Status: DISCONTINUED | OUTPATIENT
Start: 2022-11-29 | End: 2022-12-05

## 2022-11-29 RX ORDER — METHYLPHENIDATE HCL 5 MG
10 TABLET ORAL
Refills: 0 | Status: DISCONTINUED | OUTPATIENT
Start: 2022-11-29 | End: 2022-11-30

## 2022-11-29 RX ORDER — DILTIAZEM HCL 120 MG
120 CAPSULE, EXT RELEASE 24 HR ORAL DAILY
Refills: 0 | Status: DISCONTINUED | OUTPATIENT
Start: 2022-11-29 | End: 2022-11-29

## 2022-11-29 RX ORDER — METHYLPHENIDATE HCL 5 MG
20 TABLET ORAL
Refills: 0 | Status: DISCONTINUED | OUTPATIENT
Start: 2022-11-29 | End: 2022-12-06

## 2022-11-29 RX ORDER — INSULIN LISPRO 100/ML
VIAL (ML) SUBCUTANEOUS
Refills: 0 | Status: DISCONTINUED | OUTPATIENT
Start: 2022-11-29 | End: 2022-12-07

## 2022-11-29 RX ORDER — SODIUM CHLORIDE 9 MG/ML
1000 INJECTION, SOLUTION INTRAVENOUS
Refills: 0 | Status: DISCONTINUED | OUTPATIENT
Start: 2022-11-29 | End: 2022-11-30

## 2022-11-29 RX ORDER — POTASSIUM PHOSPHATE, MONOBASIC POTASSIUM PHOSPHATE, DIBASIC 236; 224 MG/ML; MG/ML
30 INJECTION, SOLUTION INTRAVENOUS ONCE
Refills: 0 | Status: COMPLETED | OUTPATIENT
Start: 2022-11-29 | End: 2022-11-29

## 2022-11-29 RX ORDER — SODIUM CHLORIDE 9 MG/ML
1000 INJECTION INTRAMUSCULAR; INTRAVENOUS; SUBCUTANEOUS
Refills: 0 | Status: DISCONTINUED | OUTPATIENT
Start: 2022-11-29 | End: 2022-11-29

## 2022-11-29 RX ORDER — DEXTROSE 50 % IN WATER 50 %
12.5 SYRINGE (ML) INTRAVENOUS ONCE
Refills: 0 | Status: DISCONTINUED | OUTPATIENT
Start: 2022-11-29 | End: 2022-12-07

## 2022-11-29 RX ORDER — CEFUROXIME AXETIL 250 MG
1 TABLET ORAL
Qty: 0 | Refills: 0 | DISCHARGE
Start: 2022-11-29

## 2022-11-29 RX ADMIN — Medication 20 MILLIGRAM(S): at 08:44

## 2022-11-29 RX ADMIN — POTASSIUM PHOSPHATE, MONOBASIC POTASSIUM PHOSPHATE, DIBASIC 83.33 MILLIMOLE(S): 236; 224 INJECTION, SOLUTION INTRAVENOUS at 15:08

## 2022-11-29 RX ADMIN — Medication 1: at 08:55

## 2022-11-29 RX ADMIN — GABAPENTIN 400 MILLIGRAM(S): 400 CAPSULE ORAL at 05:29

## 2022-11-29 RX ADMIN — SODIUM CHLORIDE 80 MILLILITER(S): 9 INJECTION, SOLUTION INTRAVENOUS at 22:15

## 2022-11-29 RX ADMIN — DEXTROSE MONOHYDRATE, SODIUM CHLORIDE, AND POTASSIUM CHLORIDE 80 MILLILITER(S): 50; .745; 4.5 INJECTION, SOLUTION INTRAVENOUS at 01:26

## 2022-11-29 RX ADMIN — Medication 5 MILLIGRAM(S): at 23:13

## 2022-11-29 RX ADMIN — Medication 2: at 12:56

## 2022-11-29 RX ADMIN — Medication 10 MILLIGRAM(S): at 13:12

## 2022-11-29 RX ADMIN — Medication 100 GRAM(S): at 12:42

## 2022-11-29 RX ADMIN — LATANOPROST 1 DROP(S): 0.05 SOLUTION/ DROPS OPHTHALMIC; TOPICAL at 23:14

## 2022-11-29 RX ADMIN — Medication 120 MILLIGRAM(S): at 05:35

## 2022-11-29 RX ADMIN — Medication 500 MILLIGRAM(S): at 05:30

## 2022-11-29 NOTE — H&P ADULT - NSHPPHYSICALEXAM_GEN_ALL_CORE
AO1, EOV, FC, EOMI, L gaze preference, minimal verbal outut, L droop, BUE spontaneous,  hand (L>R), BLE minimal spontaneous movement.

## 2022-11-29 NOTE — PROGRESS NOTE ADULT - PROBLEM SELECTOR PROBLEM 3
H/O spontaneous intraparenchymal intracranial hemorrhage

## 2022-11-29 NOTE — PROGRESS NOTE ADULT - PROVIDER SPECIALTY LIST ADULT
Gastroenterology
Gastroenterology
Neurology
Gastroenterology
Gastroenterology
Infectious Disease
Neurology
Neurology
Gastroenterology
Infectious Disease
Infectious Disease
Neurology
Neurology
Gastroenterology
Internal Medicine

## 2022-11-29 NOTE — PROGRESS NOTE ADULT - PROBLEM SELECTOR PLAN 6
Chronic, on ritalin 20 mg @ 8 am and 10 mg 13:00, will continue   - Fall and aspiration precautions   - Change position every 2 hours   - PT/OT consulted Chronic, on ritalin 20 mg @ 8 am and 10 mg 13:00, will continue   - Fall and aspiration precautions   - Change position every 2 hours

## 2022-11-29 NOTE — PROGRESS NOTE ADULT - SUBJECTIVE AND OBJECTIVE BOX
[facility]  Lorenzo Adam PA-C  [address] [place] [address]  [phone]      INTERVAL HPI/OVERNIGHT EVENTS:  Pt s/e  MBS noted  No new Gi events    MEDICATIONS  (STANDING):  dextrose 5% + sodium chloride 0.45% with potassium chloride 20 mEq/L 1000 milliLiter(s) (80 mL/Hr) IV Continuous <Continuous>  dextrose 5%. 1000 milliLiter(s) (100 mL/Hr) IV Continuous <Continuous>  dextrose 5%. 1000 milliLiter(s) (50 mL/Hr) IV Continuous <Continuous>  dextrose 50% Injectable 25 Gram(s) IV Push once  dextrose 50% Injectable 12.5 Gram(s) IV Push once  dextrose 50% Injectable 25 Gram(s) IV Push once  diltiazem    milliGRAM(s) Oral daily  gabapentin 400 milliGRAM(s) Oral three times a day  glucagon  Injectable 1 milliGRAM(s) IntraMuscular once  insulin lispro (ADMELOG) corrective regimen sliding scale   SubCutaneous every 6 hours  latanoprost 0.005% Ophthalmic Solution 1 Drop(s) Both EYES at bedtime  magnesium sulfate  IVPB 1 Gram(s) IV Intermittent once  methylphenidate 20 milliGRAM(s) Oral daily  methylphenidate 10 milliGRAM(s) Oral daily  piperacillin/tazobactam IVPB.. 3.375 Gram(s) IV Intermittent every 8 hours    MEDICATIONS  (PRN):  acetaminophen  Suppository .. 650 milliGRAM(s) Rectal every 6 hours PRN Temp greater or equal to 38C (100.4F)  aluminum hydroxide/magnesium hydroxide/simethicone Suspension 30 milliLiter(s) Oral every 4 hours PRN Dyspepsia  dextrose Oral Gel 15 Gram(s) Oral once PRN Blood Glucose LESS THAN 70 milliGRAM(s)/deciliter  ondansetron Injectable 4 milliGRAM(s) IV Push every 8 hours PRN Nausea and/or Vomiting      Allergies    No Known Allergies    PHYSICAL EXAM:   Vital Signs Last 24 Hrs  T(C): 36.5 (29 Nov 2022 04:54), Max: 36.6 (28 Nov 2022 12:52)  T(F): 97.7 (29 Nov 2022 04:54), Max: 97.9 (28 Nov 2022 12:52)  HR: 91 (29 Nov 2022 04:54) (91 - 95)  BP: 136/94 (29 Nov 2022 05:25) (135/110 - 138/96)  BP(mean): --  RR: 18 (29 Nov 2022 04:54) (18 - 18)  SpO2: 95% (29 Nov 2022 04:54) (94% - 98%)    Parameters below as of 29 Nov 2022 04:54  Patient On (Oxygen Delivery Method): room air      GENERAL:  Appears stated age  ABDOMEN:  Soft, non-tender, non-distended  NEURO:  Alert    LABS:                                   10.7   8.58  )-----------( 186      ( 29 Nov 2022 07:35 )             32.7     11-29    136  |  108  |  4<L>  ----------------------------<  698<HH>  7.2<HH>   |  24  |  0.94    Ca    7.1<L>      29 Nov 2022 07:35  Phos  2.0     11-29  Mg     1.6     11-29

## 2022-11-29 NOTE — PROGRESS NOTE ADULT - SUBJECTIVE AND OBJECTIVE BOX
[facility]  Ovidio Manning MD PhD, Jojo Boggs MD, Hafsa Bhardwaj MD, Danica Goldsmith MD, Samy Terry MD  and providing coverage with Meet Perea MD  Providing Infectious Disease Consultations at [facility], [facility], [facility]'s    Office# [phone] to schedule follow up appointments  Answering Service for urgent calls or New Consults [phone]  Cell# to text for urgent issues Ovidio Manning [phone]     infectious diseases progress note:    MELBA PARMAR is a 72y y. o. Male patient    Overnight and events of the last 24hrs reviewed    Allergies    No Known Allergies    Intolerances        ANTIBIOTICS/RELEVANT:  antimicrobials  cefuroxime   Tablet 500 milliGRAM(s) Oral every 12 hours    immunologic:    OTHER:  acetaminophen  Suppository .. 650 milliGRAM(s) Rectal every 6 hours PRN  aluminum hydroxide/magnesium hydroxide/simethicone Suspension 30 milliLiter(s) Oral every 4 hours PRN  dextrose 5% + sodium chloride 0.45% with potassium chloride 20 mEq/L 1000 milliLiter(s) IV Continuous <Continuous>  dextrose 5%. 1000 milliLiter(s) IV Continuous <Continuous>  dextrose 5%. 1000 milliLiter(s) IV Continuous <Continuous>  dextrose 50% Injectable 25 Gram(s) IV Push once  dextrose 50% Injectable 12.5 Gram(s) IV Push once  dextrose 50% Injectable 25 Gram(s) IV Push once  dextrose Oral Gel 15 Gram(s) Oral once PRN  diltiazem    milliGRAM(s) Oral daily  gabapentin 400 milliGRAM(s) Oral three times a day  glucagon  Injectable 1 milliGRAM(s) IntraMuscular once  insulin lispro (ADMELOG) corrective regimen sliding scale   SubCutaneous three times a day before meals  insulin lispro (ADMELOG) corrective regimen sliding scale   SubCutaneous at bedtime  latanoprost 0.005% Ophthalmic Solution 1 Drop(s) Both EYES at bedtime  magnesium sulfate  IVPB 1 Gram(s) IV Intermittent once  methylphenidate 20 milliGRAM(s) Oral daily  methylphenidate 10 milliGRAM(s) Oral daily  ondansetron Injectable 4 milliGRAM(s) IV Push every 8 hours PRN      Objective:  Vital Signs Last 24 Hrs  T(C): 36.5 (29 Nov 2022 04:54), Max: 36.6 (28 Nov 2022 12:52)  T(F): 97.7 (29 Nov 2022 04:54), Max: 97.9 (28 Nov 2022 12:52)  HR: 91 (29 Nov 2022 04:54) (91 - 95)  BP: 136/94 (29 Nov 2022 05:25) (135/110 - 138/96)  BP(mean): --  RR: 18 (29 Nov 2022 04:54) (18 - 18)  SpO2: 95% (29 Nov 2022 04:54) (94% - 98%)    Parameters below as of 29 Nov 2022 04:54  Patient On (Oxygen Delivery Method): room air        T(C): 36.5 (11-29-22 @ 04:54), Max: 36.8 (11-27-22 @ 13:09)  T(C): 36.5 (11-29-22 @ 04:54), Max: 37 (11-27-22 @ 04:51)  T(C): 36.5 (11-29-22 @ 04:54), Max: 37.1 (11-25-22 @ 12:50)    PHYSICAL EXAM:  HEENT: NC atraumatic  Neck: supple  Respiratory: no accessory muscle use, breathing comfortably  Cardiovascular: distant  Gastrointestinal: normal appearing, nondistended  Extremities: no clubbing, no cyanosis,        LABS:                          10.7   8.58  )-----------( 186      ( 29 Nov 2022 07:35 )             32.7       WBC  8.58 11-29 @ 07:35  7.13 11-28 @ 08:25  10.23 11-27 @ 10:10  13.83 11-26 @ 07:52  14.26 11-25 @ 08:45  25.02 11-24 @ 06:37  8.13 11-23 @ 05:29  10.02 11-22 @ 22:10      11-29    136  |  108  |  4<L>  ----------------------------<  698<HH>  7.2<HH>   |  24  |  0.94    Ca    7.1<L>      29 Nov 2022 07:35  Phos  2.0     11-29  Mg     1.6     11-29        Creatinine, Serum: 0.94 mg/dL (11-29-22 @ 07:35)  Creatinine, Serum: 0.90 mg/dL (11-28-22 @ 08:25)  Creatinine, Serum: 0.79 mg/dL (11-27-22 @ 10:10)  Creatinine, Serum: 0.68 mg/dL (11-26-22 @ 07:52)  Creatinine, Serum: 0.82 mg/dL (11-25-22 @ 08:45)  Creatinine, Serum: 0.97 mg/dL (11-24-22 @ 06:37)  Creatinine, Serum: 0.88 mg/dL (11-23-22 @ 05:29)  Creatinine, Serum: 0.86 mg/dL (11-22-22 @ 22:10)                INFLAMMATORY MARKERS      MICROBIOLOGY:              RADIOLOGY & ADDITIONAL STUDIES:

## 2022-11-29 NOTE — PROGRESS NOTE ADULT - PROBLEM SELECTOR PLAN 1
Fever- Sepsis with Leukocytosis, Hypotension -Improving , Sepsis -RESOLVED   Suspec  possible Aspiration PNA   - 11/22 Blood x2 NGTD and urine culture Klebsiella (Zosyn sensitive)  - 11/24 CXR: Right-sided infiltrate; aspiration PNA   - Continue Zosyn IVPB q 8 hrs, Change to PO Abx as per Dr Manning   - Continue on mod thick liquids w/ aspiration precautions  - IVF D5 1/2 NS 80 ml/hr  - Tylenol 650 mg DC  Suppository   - ID Dr Manning follow up Fever- Sepsis with Leukocytosis, Hypotension -Improving , Sepsis -RESOLVED   Suspec  possible Aspiration PNA   - 11/22 Blood x2 NGTD and urine culture Klebsiella (Zosyn sensitive)  - 11/24 CXR: Right-sided infiltrate; aspiration PNA   - Continue PO abx until 11/29 AM  - Continue on mod thick liquids w/ aspiration precautions  - IVF D5 1/2 NS 80 ml/hr  - Tylenol 650 mg NV  Suppository   - ID Dr Manning recs appreciated Fever- Sepsis with Leukocytosis, Hypotension -Improving , Sepsis -RESOLVED   Suspec  possible Aspiration PNA   - 11/22 Blood x2 NGTD and urine culture Klebsiella (Zosyn sensitive)  - 11/24 CXR: Right-sided infiltrate; aspiration PNA   - Continue PO abx last dose 11/29  - Continue on mod thick liquids w/ aspiration precautions  - Tylenol 650 mg MD  Suppository   - ID Dr Manning recs appreciated Fever- Sepsis with Leukocytosis, Hypotension -Improving , Sepsis -RESOLVED   Suspec  possible Aspiration PNA   - 11/22 Blood x2 no growth final and urine culture Klebsiella (Zosyn sensitive)  - 11/24 CXR: Right-sided infiltrate; aspiration PNA   - Continue PO abx last dose 11/29  - Continue on mod thick liquids w/ aspiration precautions  - Tylenol 650 mg AK  Suppository   - ID Dr Manning recs appreciated Fever- Sepsis with Leukocytosis, Hypotension -Improving , Sepsis -RESOLVED   Suspec  possible Aspiration PNA - Rt LL -Rxed   - 11/22 Blood x2 no growth final and urine culture Klebsiella (Zosyn sensitive)  - 11/24 CXR: Right-sided infiltrate; aspiration PNA   - Continue PO abx last dose 11/29  - Continue on mod thick liquids w/ aspiration precautions  - Tylenol 650 mg OH  Suppository   - ID Dr Manning recs appreciated- completed-

## 2022-11-29 NOTE — PROGRESS NOTE ADULT - SUBJECTIVE AND OBJECTIVE BOX
INTERVAL HPI:  11/24/22: pt seen, examined , Pt had fever 103.8 & 101 early this AM , Afebrile Now, ON IV fluids, IV Bolus given, Leukocytosis ,Started on IV Abx Zosyn, wife at bedside, ID  Dr Boggs called , Hypokalemia - IV K replaced. Remeron d/c.    11/25/22: Patient seen and examined at bedside. Pt following some simple commands (move hands, squeeze finger), otherwise unresponsive to speech and unable to cooperate in AM. Reevaluated x2 in AM by speech, started on moderately thick liquids. WBC improving, replace K     11/26/22: Patient seen and examined at bedside. Pt asleep during exam. Did not open eyes but nodded "yes" when I said his name. Potassium repleted. Wbc continues to improve. Hb dropped from 11.4 to 105. Plan to transfer to [facility] for further management. On IV Abx, WBC Improving     11/27/22: Patient seen and examined at bedside. Pt somewhat cooperative with exam. ROS unreliable. COVID negative. Plan to transfer to [facility]. WBC wnl.    11/28/22: Patient seen and examined at bedside. Pt more awake, able to respond with single words to some questions. Nods/shakes head otherwise. ROS unreliable, but pt denies fever/chills, lightheadedness/dizziness, cp/palp, sob/cough, abd pain, n/v, c/d. Pt still awaiting transfer to [facility], MBS today: moderate oral dysphagia, mild pharyngeal dysphagia, inconsistent swallowing. IV Abx ---> PO Abx     11/29/22: Patient seen and examined at bedside. Pt is alert and responsive, but generally nonverbal. Nods/shakes head to y/n question unreliably. ROS unreliable. Still awaiting bed at [facility]. Last dose of oral abx planned for this AM.    OVERNIGHT EVENTS: NONE    REVIEW OF SYSTEMS:  unable to accurately obtain due to patient condition.    PHYSICAL EXAM: awake, smiles , ~non verbal, falling in and out of sleep during conversation and exam  GENERAL:  [ x ] NAD , [ x ] well appearing, [  ] Agitated, [x  ] Drowsy,  [x  ] Lethargy, [  ] confused   HEAD:  [ x ] Normal, [  ] Other  EYES: [X] PERRLA [X] EOMI grossly [X] Conjunctiva and sclera clear  ENMT:  [ x ] Normal, [X] Retained food in mouth in AM, [ x ] Good dentition, [ x ] No Thrush  NECK:  [ x ] Supple, [ x ] No JVD, [ x ] Normal thyroid, [  ] Lymphadenopathy [  ] Other  CHEST/LUNG:  [ x ] Clear to auscultation bilaterally, [ x ] Breath Sounds equal B/L / Decrease, [x  ] poor effort  [ x ] No rales, [ x ] No rhonchi  [x  ]  No wheezing,   HEART:  [ x ] Regular rate and rhythm, [  ] tachycardia, [  ] Bradycardia,  [  ] irregular  [ x ] No murmurs, No rubs, No gallops, [  ] PPM in place (Mfr:  )  ABDOMEN: [ x ] Soft, [ x ] Nondistended, [ x ] No mass, [x  ] Bowel sounds present, [x  ] obese  NERVOUS SYSTEM:  [  ] Alert & Oriented x , [ x ] Nonfocal  [  ] Confusion  [  ] Encephalopathic [  ] Sedated [X] Sleepy [  ] Dementia   EXTREMITIES: [ x ] No clubbing, No cyanosis,  [  ] edema B/L lower EXT. [ x ] PVD stasis skin changes B/L Lower EXT, [  ] wound + Lower Ext pain -  LYMPH: No lymphadenopathy noted  SKIN:  [  ] No rashes or lesions, [  ] Pressure Ulcers, [  ] ecchymosis, [  ] Skin Tears, [x  ] Other- dry skin, with Scabs all EXT      Consultant(s) Notes Reviewed:  [x  ] YES     Care Discussed with [X] Consultants  [ x ] Patient  [x  ] Family [  ] HCP [  ]   [  ] Social Service  [x  ] RN, [  ] Physical Therapy,[  ] Palliative care team  DVT PPX: [  ] Lovenox, [  ] S C Heparin, [  ] Coumadin, [  ] Xarelto, [  ] Eliquis, [  ] Pradaxa, [  ] IV Heparin drip, [  ] SCD [  ] Contraindication 2 to GI Bleed,[  ] Ambulation [  ] Contraindicated 2 to  bleed [x] Contraindicated 2 to Brain Bleed  Advanced directive: [  ] None, [ x ] DNR/DNI   Patient is a 72y old  Male who presents with a chief complaint of acute on chronic AMS (29 Nov 2022 11:29)    HPI:  72M BPH CAD, HTN, HLD, Afib, SDH s/p mechanical fall, recurrent ICH when on AC brought today for weakness. History obtained from pt's wife Love and chart review given patient medical condition. Pt admitted from 10/19 to 10/22/2022 at [facility] for SHD s/p adexxa. Pt's wife endorses that patient has been progressively deteriorating, states that now only is reactive to painful stimulus, prior used to articulate some words. Patient completed 2 weeks of outpatient PT, has 8-10 hours nurse aid daily. Temp at home 99.9. Wife assist with feeding, was seen by neuro who start mirtazapine and sent for neurologic work up.     ED course  VS: Afebrile, HR: 96, BP: 154/97, SpO2: 97% RA RR 19  Labs significant for glucose 227, Alkaline phosphate 227, UA shows glucose 250  CT head showed chronic subdural hematomas overlying the left side of the posterior falx measuring up to 1.7 cm and 1.5 cm overlying the left temporal convexity.  Mild mass effect with sulcal effacement and partial compression of the  left lateral ventricle. No new acute intracranial hemorrhage.  EKG shows Afib with RVR @ 110 bpm, Right axis deviation    (23 Nov 2022 01:11)    INTERVAL HPI:  11/24/22: pt seen, examined , Pt had fever 103.8 & 101 early this AM , Afebrile Now, ON IV fluids, IV Bolus given, Leukocytosis ,Started on IV Abx Zosyn, wife at bedside, ID  Dr Boggs called , Hypokalemia - IV K replaced. Remeron d/c.    11/25/22: Patient seen and examined at bedside. Pt following some simple commands (move hands, squeeze finger), otherwise unresponsive to speech and unable to cooperate in AM. Reevaluated x2 in AM by speech, started on moderately thick liquids. WBC improving, replace K     11/26/22: Patient seen and examined at bedside. Pt asleep during exam. Did not open eyes but nodded "yes" when I said his name. Potassium repleted. Wbc continues to improve. Hb dropped from 11.4 to 105. Plan to transfer to [facility] for further management. On IV Abx, WBC Improving     11/27/22: Patient seen and examined at bedside. Pt somewhat cooperative with exam. ROS unreliable. COVID negative. Plan to transfer to [facility]. WBC wnl.    11/28/22: Patient seen and examined at bedside. Pt more awake, able to respond with single words to some questions. Nods/shakes head otherwise. ROS unreliable, but pt denies fever/chills, lightheadedness/dizziness, cp/palp, sob/cough, abd pain, n/v, c/d. Pt still awaiting transfer to [facility], MBS today: moderate oral dysphagia, mild pharyngeal dysphagia, inconsistent swallowing. IV Abx ---> PO Abx     11/29/22: Patient seen and examined at bedside. Pt is alert and responsive, but generally nonverbal. Nods/shakes head to y/n question unreliably. ROS unreliable. Still awaiting bed at [facility]. Last dose of oral abx planned for this AM. EEG ordered.    OVERNIGHT EVENTS: NONE  Home Medications:  Dextrose 5% with 0.45% NaCl intravenous solution: 1000 milliliter(s) intravenous every 24 hours (27 Nov 2022 11:34)  gabapentin 400 mg oral capsule: 1 cap(s) orally 3 times a day (23 Nov 2022 02:49)  hydrALAZINE 25 mg oral tablet: 2 tab(s) orally every 8 hours (23 Nov 2022 02:49)  latanoprost 0.005% ophthalmic solution: 1 drop(s) to each affected eye once a day (in the evening) (23 Nov 2022 02:49)  methylphenidate 10 mg oral tablet: 2 pills at 8AM; 1 pill at 1PM (23 Nov 2022 02:49)  mirtazapine 7.5 mg oral tablet: 1 tab(s) orally once a day (at bedtime) (23 Nov 2022 02:49)  piperacillin-tazobactam: 3.375 gram(s) in dextrose 5% 100mL intravenous every 8 hours (26 Nov 2022 13:03)  rosuvastatin 10 mg oral tablet: 1 tab(s) orally once a day (23 Nov 2022 02:49)      MEDICATIONS  (STANDING):  cefuroxime   Tablet 500 milliGRAM(s) Oral every 12 hours  dextrose 5%. 1000 milliLiter(s) (100 mL/Hr) IV Continuous <Continuous>  dextrose 5%. 1000 milliLiter(s) (50 mL/Hr) IV Continuous <Continuous>  dextrose 50% Injectable 25 Gram(s) IV Push once  dextrose 50% Injectable 12.5 Gram(s) IV Push once  dextrose 50% Injectable 25 Gram(s) IV Push once  diltiazem    milliGRAM(s) Oral daily  gabapentin 400 milliGRAM(s) Oral three times a day  glucagon  Injectable 1 milliGRAM(s) IntraMuscular once  insulin lispro (ADMELOG) corrective regimen sliding scale   SubCutaneous three times a day before meals  insulin lispro (ADMELOG) corrective regimen sliding scale   SubCutaneous at bedtime  latanoprost 0.005% Ophthalmic Solution 1 Drop(s) Both EYES at bedtime  methylphenidate 20 milliGRAM(s) Oral daily  methylphenidate 10 milliGRAM(s) Oral daily  potassium phosphate IVPB 30 milliMole(s) IV Intermittent once    MEDICATIONS  (PRN):  acetaminophen  Suppository .. 650 milliGRAM(s) Rectal every 6 hours PRN Temp greater or equal to 38C (100.4F)  aluminum hydroxide/magnesium hydroxide/simethicone Suspension 30 milliLiter(s) Oral every 4 hours PRN Dyspepsia  dextrose Oral Gel 15 Gram(s) Oral once PRN Blood Glucose LESS THAN 70 milliGRAM(s)/deciliter  ondansetron Injectable 4 milliGRAM(s) IV Push every 8 hours PRN Nausea and/or Vomiting      Allergies    No Known Allergies    Intolerances        Social History:  Tobacco: denies   EtOH: denies   Recreational drug use: denies   Lives with: wife   Ambulates: bedbound, has a nurse aid 8-10 daily   Vaccinations: Fully COVID-19 vaccinated (23 Nov 2022 01:11)    REVIEW OF SYSTEMS:  unable to accurately obtain due to patient condition.    Vital Signs Last 24 Hrs  T(C): 36.6 (29 Nov 2022 12:35), Max: 36.6 (29 Nov 2022 12:35)  T(F): 97.8 (29 Nov 2022 12:35), Max: 97.8 (29 Nov 2022 12:35)  HR: 71 (29 Nov 2022 12:35) (71 - 95)  BP: 126/83 (29 Nov 2022 12:35) (126/83 - 138/96)  BP(mean): --  RR: 18 (29 Nov 2022 12:35) (18 - 18)  SpO2: 95% (29 Nov 2022 12:35) (95% - 98%)    Parameters below as of 29 Nov 2022 12:35  Patient On (Oxygen Delivery Method): room air      Finger Stick        11-28 @ 07:01  -  11-29 @ 07:00  --------------------------------------------------------  IN: 1410 mL / OUT: 0 mL / NET: 1410 mL      PHYSICAL EXAM: awake, smiles , ~non verbal, falling in and out of sleep during conversation and exam  GENERAL:  [ x ] NAD , [ x ] well appearing, [  ] Agitated, [x  ] Drowsy,  [x  ] Lethargy, [  ] confused   HEAD:  [ x ] Normal, [  ] Other  EYES: [X] PERRLA [X] EOMI grossly [X] Conjunctiva and sclera clear  ENMT:  [ x ] Normal, [X] Retained food in mouth in AM, [ x ] Good dentition, [ x ] No Thrush  NECK:  [ x ] Supple, [ x ] No JVD, [ x ] Normal thyroid, [  ] Lymphadenopathy [  ] Other  CHEST/LUNG:  [ x ] Clear to auscultation bilaterally, [ x ] Breath Sounds equal B/L / Decrease, [x  ] poor effort  [ x ] No rales, [ x ] No rhonchi  [x  ]  No wheezing,   HEART:  [ x ] Regular rate and rhythm, [  ] tachycardia, [  ] Bradycardia,  [  ] irregular  [ x ] No murmurs, No rubs, No gallops, [  ] PPM in place (Mfr:  )  ABDOMEN: [ x ] Soft, [ x ] Nondistended, [ x ] No mass, [x  ] Bowel sounds present, [x  ] obese  NERVOUS SYSTEM:  [  ] Alert & Oriented x , [ x ] Nonfocal  [  ] Confusion  [  ] Encephalopathic [  ] Sedated [X] Sleepy [  ] Dementia   EXTREMITIES: [ x ] No clubbing, No cyanosis,  [  ] edema B/L lower EXT. [ x ] PVD stasis skin changes B/L Lower EXT, [  ] wound + Lower Ext pain -  LYMPH: No lymphadenopathy noted  SKIN:  [  ] No rashes or lesions, [  ] Pressure Ulcers, [  ] ecchymosis, [  ] Skin Tears, [x  ] Other- dry skin, with Scabs all EXT    DIET:     LABS:                        10.7   8.58  )-----------( 186      ( 29 Nov 2022 07:35 )             32.7     29 Nov 2022 10:35    142    |  108    |  4      ----------------------------<  256    4.0     |  28     |  0.85     Ca    8.3        29 Nov 2022 10:35  Phos  2.2       29 Nov 2022 10:35  Mg     2.0       29 Nov 2022 10:35    TPro  5.5    /  Alb  2.1    /  TBili  0.5    /  DBili  x      /  AST  21     /  ALT  46     /  AlkPhos  86     29 Nov 2022 10:35          Culture Results:   >100,000 CFU/ml Klebsiella pneumoniae (11-24 @ 08:26)  Culture Results:   No growth to date. (11-24 @ 06:40)  Culture Results:   No growth to date. (11-24 @ 06:37)  Culture Results:   No Growth Final (11-22 @ 22:10)  Culture Results:   <10,000 CFU/mL Normal Urogenital Angle (11-22 @ 22:10)  Culture Results:   No Growth Final (11-22 @ 22:05)                  Culture - Urine (collected 24 Nov 2022 08:26)  Source: Catheterized Catheterized  Final Report (27 Nov 2022 16:42):    >100,000 CFU/ml Klebsiella pneumoniae  Organism: Klebsiella pneumoniae (27 Nov 2022 16:42)  Organism: Klebsiella pneumoniae (27 Nov 2022 16:42)    Culture - Blood (collected 24 Nov 2022 06:40)  Source: .Blood Blood-Peripheral  Preliminary Report (25 Nov 2022 14:01):    No growth to date.    Culture - Blood (collected 24 Nov 2022 06:37)  Source: .Blood Blood-Peripheral  Preliminary Report (25 Nov 2022 14:01):    No growth to date.    Culture - Urine (collected 22 Nov 2022 22:10)  Source: Catheterized Catheterized  Final Report (24 Nov 2022 07:27):    <10,000 CFU/mL Normal Urogenital Angle    Culture - Blood (collected 22 Nov 2022 22:10)  Source: .Blood Blood-Venous  Final Report (28 Nov 2022 02:01):    No Growth Final    Culture - Blood (collected 22 Nov 2022 22:05)  Source: .Blood Blood-Venous  Final Report (28 Nov 2022 02:01):    No Growth Final         Anemia Panel:  Vitamin B12, Serum: 656 pg/mL (11-24-22 @ 06:37)      Thyroid Panel:                RADIOLOGY & ADDITIONAL TESTS:      HEALTH ISSUES - PROBLEM Dx:  Fever    AMS (altered mental status)    HTN (hypertension)  You have a known history of hypertension, the medical term for high blood pressure. Untreated high blood pressure increases the strain on the heart and arteries, eventually causing organ damage. High blood pressure also increases the risk of heart failure, heart attack (myocardial infarction), stroke, and kidney failure.   - Continue to take your home medications to prevent the possible complications of uncontrolled hypertension.   - Please also follow up with your primary care physician on a regular basis to make sure your blood pressure continues to be well controlled.      Motor system disease    History of diabetes mellitus    Peripheral neuropathy    Major depression    Need for prophylactic measure    Dysphagia      Consultant(s) Notes Reviewed:  [x  ] YES     Care Discussed with [X] Consultants  [ x ] Patient  [x  ] Family [  ] HCP [  ]   [  ] Social Service  [x  ] RN, [  ] Physical Therapy,[  ] Palliative care team  DVT PPX: [  ] Lovenox, [  ] S C Heparin, [  ] Coumadin, [  ] Xarelto, [  ] Eliquis, [  ] Pradaxa, [  ] IV Heparin drip, [  ] SCD [  ] Contraindication 2 to GI Bleed,[  ] Ambulation [  ] Contraindicated 2 to  bleed [x] Contraindicated 2 to Brain Bleed  Advanced directive: [  ] None, [ x ] DNR/DNI     Patient is a 72y old  Male who presents with a chief complaint of acute on chronic AMS (29 Nov 2022 11:29)    HPI:  72M BPH CAD, HTN, HLD, Afib, SDH s/p mechanical fall, recurrent ICH when on AC brought today for weakness. History obtained from pt's wife Love and chart review given patient medical condition. Pt admitted from 10/19 to 10/22/2022 at [facility] for SHD s/p adexxa. Pt's wife endorses that patient has been progressively deteriorating, states that now only is reactive to painful stimulus, prior used to articulate some words. Patient completed 2 weeks of outpatient PT, has 8-10 hours nurse aid daily. Temp at home 99.9. Wife assist with feeding, was seen by neuro who start mirtazapine and sent for neurologic work up.     ED course  VS: Afebrile, HR: 96, BP: 154/97, SpO2: 97% RA RR 19  Labs significant for glucose 227, Alkaline phosphate 227, UA shows glucose 250  CT head showed chronic subdural hematomas overlying the left side of the posterior falx measuring up to 1.7 cm and 1.5 cm overlying the left temporal convexity.  Mild mass effect with sulcal effacement and partial compression of the  left lateral ventricle. No new acute intracranial hemorrhage.  EKG shows Afib with RVR @ 110 bpm, Right axis deviation    (23 Nov 2022 01:11)    INTERVAL HPI:  11/24/22: pt seen, examined , Pt had fever 103.8 & 101 early this AM , Afebrile Now, ON IV fluids, IV Bolus given, Leukocytosis ,Started on IV Abx Zosyn, wife at bedside, ID  Dr Boggs called , Hypokalemia - IV K replaced. Remeron d/c.    11/25/22: Patient seen and examined at bedside. Pt following some simple commands (move hands, squeeze finger), otherwise unresponsive to speech and unable to cooperate in AM. Reevaluated x2 in AM by speech, started on moderately thick liquids. WBC improving, replace K     11/26/22: Patient seen and examined at bedside. Pt asleep during exam. Did not open eyes but nodded "yes" when I said his name. Potassium repleted. Wbc continues to improve. Hb dropped from 11.4 to 105. Plan to transfer to [facility] for further management. On IV Abx, WBC Improving     11/27/22: Patient seen and examined at bedside. Pt somewhat cooperative with exam. ROS unreliable. COVID negative. Plan to transfer to [facility]. WBC wnl.    11/28/22: Patient seen and examined at bedside. Pt more awake, able to respond with single words to some questions. Nods/shakes head otherwise. ROS unreliable, but pt denies fever/chills, lightheadedness/dizziness, cp/palp, sob/cough, abd pain, n/v, c/d. Pt still awaiting transfer to [facility], MBS today: moderate oral dysphagia, mild pharyngeal dysphagia, inconsistent swallowing. IV Abx ---> PO Abx     11/29/22: Patient seen and examined at bedside. Pt is alert and responsive, but generally nonverbal. Nods/shakes head to y/n question unreliably. ROS unreliable. Last dose of oral abx planned for this AM. EEG ordered. Bed assigned at [facility].    OVERNIGHT EVENTS: NONE    Home Medications:  Dextrose 5% with 0.45% NaCl intravenous solution: 1000 milliliter(s) intravenous every 24 hours (27 Nov 2022 11:34)  gabapentin 400 mg oral capsule: 1 cap(s) orally 3 times a day (23 Nov 2022 02:49)  hydrALAZINE 25 mg oral tablet: 2 tab(s) orally every 8 hours (23 Nov 2022 02:49)  latanoprost 0.005% ophthalmic solution: 1 drop(s) to each affected eye once a day (in the evening) (23 Nov 2022 02:49)  methylphenidate 10 mg oral tablet: 2 pills at 8AM; 1 pill at 1PM (23 Nov 2022 02:49)  mirtazapine 7.5 mg oral tablet: 1 tab(s) orally once a day (at bedtime) (23 Nov 2022 02:49)  piperacillin-tazobactam: 3.375 gram(s) in dextrose 5% 100mL intravenous every 8 hours (26 Nov 2022 13:03)  rosuvastatin 10 mg oral tablet: 1 tab(s) orally once a day (23 Nov 2022 02:49)      MEDICATIONS  (STANDING):  cefuroxime   Tablet 500 milliGRAM(s) Oral every 12 hours  dextrose 5%. 1000 milliLiter(s) (100 mL/Hr) IV Continuous <Continuous>  dextrose 5%. 1000 milliLiter(s) (50 mL/Hr) IV Continuous <Continuous>  dextrose 50% Injectable 25 Gram(s) IV Push once  dextrose 50% Injectable 12.5 Gram(s) IV Push once  dextrose 50% Injectable 25 Gram(s) IV Push once  diltiazem    milliGRAM(s) Oral daily  gabapentin 400 milliGRAM(s) Oral three times a day  glucagon  Injectable 1 milliGRAM(s) IntraMuscular once  insulin lispro (ADMELOG) corrective regimen sliding scale   SubCutaneous three times a day before meals  insulin lispro (ADMELOG) corrective regimen sliding scale   SubCutaneous at bedtime  latanoprost 0.005% Ophthalmic Solution 1 Drop(s) Both EYES at bedtime  methylphenidate 20 milliGRAM(s) Oral daily  methylphenidate 10 milliGRAM(s) Oral daily  potassium phosphate IVPB 30 milliMole(s) IV Intermittent once    MEDICATIONS  (PRN):  acetaminophen  Suppository .. 650 milliGRAM(s) Rectal every 6 hours PRN Temp greater or equal to 38C (100.4F)  aluminum hydroxide/magnesium hydroxide/simethicone Suspension 30 milliLiter(s) Oral every 4 hours PRN Dyspepsia  dextrose Oral Gel 15 Gram(s) Oral once PRN Blood Glucose LESS THAN 70 milliGRAM(s)/deciliter  ondansetron Injectable 4 milliGRAM(s) IV Push every 8 hours PRN Nausea and/or Vomiting      Allergies    No Known Allergies    Intolerances        Social History:  Tobacco: denies   EtOH: denies   Recreational drug use: denies   Lives with: wife   Ambulates: bedbound, has a nurse aid 8-10 daily   Vaccinations: Fully COVID-19 vaccinated (23 Nov 2022 01:11)    REVIEW OF SYSTEMS:  unable to accurately obtain due to patient condition.    Vital Signs Last 24 Hrs  T(C): 36.6 (29 Nov 2022 12:35), Max: 36.6 (29 Nov 2022 12:35)  T(F): 97.8 (29 Nov 2022 12:35), Max: 97.8 (29 Nov 2022 12:35)  HR: 71 (29 Nov 2022 12:35) (71 - 95)  BP: 126/83 (29 Nov 2022 12:35) (126/83 - 138/96)  BP(mean): --  RR: 18 (29 Nov 2022 12:35) (18 - 18)  SpO2: 95% (29 Nov 2022 12:35) (95% - 98%)    Parameters below as of 29 Nov 2022 12:35  Patient On (Oxygen Delivery Method): room air      Finger Stick        11-28 @ 07:01  -  11-29 @ 07:00  --------------------------------------------------------  IN: 1410 mL / OUT: 0 mL / NET: 1410 mL      PHYSICAL EXAM: awake, smiles , ~non verbal, falling in and out of sleep during conversation and exam  GENERAL:  [ x ] NAD , [ x ] well appearing, [  ] Agitated, [x  ] Drowsy,  [x  ] Lethargy, [  ] confused   HEAD:  [ x ] Normal, [  ] Other  EYES: [X] PERRLA [X] EOMI grossly [X] Conjunctiva and sclera clear  ENMT:  [ x ] Normal, [X] Retained food in mouth in AM, [ x ] Good dentition, [ x ] No Thrush  NECK:  [ x ] Supple, [ x ] No JVD, [ x ] Normal thyroid, [  ] Lymphadenopathy [  ] Other  CHEST/LUNG:  [ x ] Clear to auscultation bilaterally, [ x ] Breath Sounds equal B/L / Decrease, [x  ] poor effort  [ x ] No rales, [ x ] No rhonchi  [x  ]  No wheezing,   HEART:  [ x ] Regular rate and rhythm, [  ] tachycardia, [  ] Bradycardia,  [  ] irregular  [ x ] No murmurs, No rubs, No gallops, [  ] PPM in place (Mfr:  )  ABDOMEN: [ x ] Soft, [ x ] Nondistended, [ x ] No mass, [x  ] Bowel sounds present, [x  ] obese  NERVOUS SYSTEM:  [  ] Alert & Oriented x , [ x ] Nonfocal  [  ] Confusion  [  ] Encephalopathic [  ] Sedated [X] Sleepy [  ] Dementia   EXTREMITIES: [ x ] No clubbing, No cyanosis,  [  ] edema B/L lower EXT. [ x ] PVD stasis skin changes B/L Lower EXT, [  ] wound + Lower Ext pain -  LYMPH: No lymphadenopathy noted  SKIN:  [  ] No rashes or lesions, [  ] Pressure Ulcers, [  ] ecchymosis, [  ] Skin Tears, [x  ] Other- dry skin, with Scabs all EXT    DIET:     LABS:                        10.7   8.58  )-----------( 186      ( 29 Nov 2022 07:35 )             32.7     29 Nov 2022 10:35    142    |  108    |  4      ----------------------------<  256    4.0     |  28     |  0.85     Ca    8.3        29 Nov 2022 10:35  Phos  2.2       29 Nov 2022 10:35  Mg     2.0       29 Nov 2022 10:35    TPro  5.5    /  Alb  2.1    /  TBili  0.5    /  DBili  x      /  AST  21     /  ALT  46     /  AlkPhos  86     29 Nov 2022 10:35          Culture Results:   >100,000 CFU/ml Klebsiella pneumoniae (11-24 @ 08:26)  Culture Results:   No growth to date. (11-24 @ 06:40)  Culture Results:   No growth to date. (11-24 @ 06:37)  Culture Results:   No Growth Final (11-22 @ 22:10)  Culture Results:   <10,000 CFU/mL Normal Urogenital Angle (11-22 @ 22:10)  Culture Results:   No Growth Final (11-22 @ 22:05)                  Culture - Urine (collected 24 Nov 2022 08:26)  Source: Catheterized Catheterized  Final Report (27 Nov 2022 16:42):    >100,000 CFU/ml Klebsiella pneumoniae  Organism: Klebsiella pneumoniae (27 Nov 2022 16:42)  Organism: Klebsiella pneumoniae (27 Nov 2022 16:42)    Culture - Blood (collected 24 Nov 2022 06:40)  Source: .Blood Blood-Peripheral  Preliminary Report (25 Nov 2022 14:01):    No growth to date.    Culture - Blood (collected 24 Nov 2022 06:37)  Source: .Blood Blood-Peripheral  Preliminary Report (25 Nov 2022 14:01):    No growth to date.    Culture - Urine (collected 22 Nov 2022 22:10)  Source: Catheterized Catheterized  Final Report (24 Nov 2022 07:27):    <10,000 CFU/mL Normal Urogenital Angle    Culture - Blood (collected 22 Nov 2022 22:10)  Source: .Blood Blood-Venous  Final Report (28 Nov 2022 02:01):    No Growth Final    Culture - Blood (collected 22 Nov 2022 22:05)  Source: .Blood Blood-Venous  Final Report (28 Nov 2022 02:01):    No Growth Final         Anemia Panel:  Vitamin B12, Serum: 656 pg/mL (11-24-22 @ 06:37)      Thyroid Panel:                RADIOLOGY & ADDITIONAL TESTS:      HEALTH ISSUES - PROBLEM Dx:  Fever    AMS (altered mental status)    HTN (hypertension)  You have a known history of hypertension, the medical term for high blood pressure. Untreated high blood pressure increases the strain on the heart and arteries, eventually causing organ damage. High blood pressure also increases the risk of heart failure, heart attack (myocardial infarction), stroke, and kidney failure.   - Continue to take your home medications to prevent the possible complications of uncontrolled hypertension.   - Please also follow up with your primary care physician on a regular basis to make sure your blood pressure continues to be well controlled.      Motor system disease    History of diabetes mellitus    Peripheral neuropathy    Major depression    Need for prophylactic measure    Dysphagia      Consultant(s) Notes Reviewed:  [x  ] YES     Care Discussed with [X] Consultants  [ x ] Patient  [x  ] Family [  ] HCP [  ]   [  ] Social Service  [x  ] RN, [  ] Physical Therapy,[  ] Palliative care team  DVT PPX: [  ] Lovenox, [  ] S C Heparin, [  ] Coumadin, [  ] Xarelto, [  ] Eliquis, [  ] Pradaxa, [  ] IV Heparin drip, [  ] SCD [  ] Contraindication 2 to GI Bleed,[  ] Ambulation [  ] Contraindicated 2 to  bleed [x] Contraindicated 2 to Brain Bleed  Advanced directive: [  ] None, [ x ] DNR/DNI     Patient is a 72y old  Male who presents with a chief complaint of acute on chronic AMS (29 Nov 2022 11:29)    HPI:  72M BPH CAD, HTN, HLD, Afib, SDH s/p mechanical fall, recurrent ICH when on AC brought today for weakness. History obtained from pt's wife Love and chart review given patient medical condition. Pt admitted from 10/19 to 10/22/2022 at [facility] for SHD s/p adexxa. Pt's wife endorses that patient has been progressively deteriorating, states that now only is reactive to painful stimulus, prior used to articulate some words. Patient completed 2 weeks of outpatient PT, has 8-10 hours nurse aid daily. Temp at home 99.9. Wife assist with feeding, was seen by neuro who start mirtazapine and sent for neurologic work up.     ED course  VS: Afebrile, HR: 96, BP: 154/97, SpO2: 97% RA RR 19  Labs significant for glucose 227, Alkaline phosphate 227, UA shows glucose 250  CT head showed chronic subdural hematomas overlying the left side of the posterior falx measuring up to 1.7 cm and 1.5 cm overlying the left temporal convexity.  Mild mass effect with sulcal effacement and partial compression of the  left lateral ventricle. No new acute intracranial hemorrhage.  EKG shows Afib with RVR @ 110 bpm, Right axis deviation    (23 Nov 2022 01:11)    INTERVAL HPI:  11/24/22: pt seen, examined , Pt had fever 103.8 & 101 early this AM , Afebrile Now, ON IV fluids, IV Bolus given, Leukocytosis ,Started on IV Abx Zosyn, wife at bedside, ID  Dr Boggs called , Hypokalemia - IV K replaced. Remeron d/c.    11/25/22: Patient seen and examined at bedside. Pt following some simple commands (move hands, squeeze finger), otherwise unresponsive to speech and unable to cooperate in AM. Reevaluated x2 in AM by speech, started on moderately thick liquids. WBC improving, replace K     11/26/22: Patient seen and examined at bedside. Pt asleep during exam. Did not open eyes but nodded "yes" when I said his name. Potassium repleted. Wbc continues to improve. Hb dropped from 11.4 to 105. Plan to transfer to [facility] for further management. On IV Abx, WBC Improving     11/27/22: Patient seen and examined at bedside. Pt somewhat cooperative with exam. ROS unreliable. COVID negative. Plan to transfer to [facility]. WBC wnl.    11/28/22: Patient seen and examined at bedside. Pt more awake, able to respond with single words to some questions. Nods/shakes head otherwise. ROS unreliable, but pt denies fever/chills, lightheadedness/dizziness, cp/palp, sob/cough, abd pain, n/v, c/d. Pt still awaiting transfer to [facility], MBS today: moderate oral dysphagia, mild pharyngeal dysphagia, inconsistent swallowing. IV Abx ---> PO Abx     11/29/22: Patient seen and examined at bedside. Pt is alert and responsive, but generally nonverbal. Nods/shakes head to y/n question unreliably. ROS unreliable. Last dose of oral abx planned for this AM. EEG ordered. Bed assigned at [facility]. Transfer today.    OVERNIGHT EVENTS: NONE    Home Medications:  Dextrose 5% with 0.45% NaCl intravenous solution: 1000 milliliter(s) intravenous every 24 hours (27 Nov 2022 11:34)  gabapentin 400 mg oral capsule: 1 cap(s) orally 3 times a day (23 Nov 2022 02:49)  hydrALAZINE 25 mg oral tablet: 2 tab(s) orally every 8 hours (23 Nov 2022 02:49)  latanoprost 0.005% ophthalmic solution: 1 drop(s) to each affected eye once a day (in the evening) (23 Nov 2022 02:49)  methylphenidate 10 mg oral tablet: 2 pills at 8AM; 1 pill at 1PM (23 Nov 2022 02:49)  mirtazapine 7.5 mg oral tablet: 1 tab(s) orally once a day (at bedtime) (23 Nov 2022 02:49)  piperacillin-tazobactam: 3.375 gram(s) in dextrose 5% 100mL intravenous every 8 hours (26 Nov 2022 13:03)  rosuvastatin 10 mg oral tablet: 1 tab(s) orally once a day (23 Nov 2022 02:49)      MEDICATIONS  (STANDING):  cefuroxime   Tablet 500 milliGRAM(s) Oral every 12 hours  dextrose 5%. 1000 milliLiter(s) (100 mL/Hr) IV Continuous <Continuous>  dextrose 5%. 1000 milliLiter(s) (50 mL/Hr) IV Continuous <Continuous>  dextrose 50% Injectable 25 Gram(s) IV Push once  dextrose 50% Injectable 12.5 Gram(s) IV Push once  dextrose 50% Injectable 25 Gram(s) IV Push once  diltiazem    milliGRAM(s) Oral daily  gabapentin 400 milliGRAM(s) Oral three times a day  glucagon  Injectable 1 milliGRAM(s) IntraMuscular once  insulin lispro (ADMELOG) corrective regimen sliding scale   SubCutaneous three times a day before meals  insulin lispro (ADMELOG) corrective regimen sliding scale   SubCutaneous at bedtime  latanoprost 0.005% Ophthalmic Solution 1 Drop(s) Both EYES at bedtime  methylphenidate 20 milliGRAM(s) Oral daily  methylphenidate 10 milliGRAM(s) Oral daily  potassium phosphate IVPB 30 milliMole(s) IV Intermittent once    MEDICATIONS  (PRN):  acetaminophen  Suppository .. 650 milliGRAM(s) Rectal every 6 hours PRN Temp greater or equal to 38C (100.4F)  aluminum hydroxide/magnesium hydroxide/simethicone Suspension 30 milliLiter(s) Oral every 4 hours PRN Dyspepsia  dextrose Oral Gel 15 Gram(s) Oral once PRN Blood Glucose LESS THAN 70 milliGRAM(s)/deciliter  ondansetron Injectable 4 milliGRAM(s) IV Push every 8 hours PRN Nausea and/or Vomiting      Allergies    No Known Allergies    Intolerances        Social History:  Tobacco: denies   EtOH: denies   Recreational drug use: denies   Lives with: wife   Ambulates: bedbound, has a nurse aid 8-10 daily   Vaccinations: Fully COVID-19 vaccinated (23 Nov 2022 01:11)    REVIEW OF SYSTEMS:  unable to accurately obtain due to patient condition.    Vital Signs Last 24 Hrs  T(C): 36.6 (29 Nov 2022 12:35), Max: 36.6 (29 Nov 2022 12:35)  T(F): 97.8 (29 Nov 2022 12:35), Max: 97.8 (29 Nov 2022 12:35)  HR: 71 (29 Nov 2022 12:35) (71 - 95)  BP: 126/83 (29 Nov 2022 12:35) (126/83 - 138/96)  BP(mean): --  RR: 18 (29 Nov 2022 12:35) (18 - 18)  SpO2: 95% (29 Nov 2022 12:35) (95% - 98%)    Parameters below as of 29 Nov 2022 12:35  Patient On (Oxygen Delivery Method): room air      Finger Stick        11-28 @ 07:01  -  11-29 @ 07:00  --------------------------------------------------------  IN: 1410 mL / OUT: 0 mL / NET: 1410 mL      PHYSICAL EXAM: awake, smiles , Minimally  verbal, falling in and out of sleep during conversation and exam  GENERAL:  [ x ] NAD , [ x ] well appearing, [  ] Agitated, [x  ] Drowsy,  [x  ] Lethargy, [  ] confused   HEAD:  [ x ] Normal, [  ] Other  EYES: [X] PERRLA [X] EOMI grossly [X] Conjunctiva and sclera clear  ENMT:  [ x ] Normal, [X] Retained food in mouth in AM, [ x ] Good dentition, [ x ] No Thrush  NECK:  [ x ] Supple, [ x ] No JVD, [ x ] Normal thyroid, [  ] Lymphadenopathy [  ] Other  CHEST/LUNG:  [ x ] Clear to auscultation bilaterally, [ x ] Breath Sounds equal B/L / Decrease, [x  ] poor effort  [ x ] No rales, [ x ] No rhonchi  [x  ]  No wheezing,   HEART:  [ x ] Regular rate and rhythm, [  ] tachycardia, [  ] Bradycardia,  [  ] irregular  [ x ] No murmurs, No rubs, No gallops, [  ] PPM in place (Mfr:  )  ABDOMEN: [ x ] Soft, [ x ] Nondistended, [ x ] No mass, [x  ] Bowel sounds present, [x  ] obese  NERVOUS SYSTEM:  [  ] Alert & Oriented x , [ x ] Nonfocal  [  ] Confusion  [  ] Encephalopathic [  ] Sedated [X] Sleepy [  ] Dementia   EXTREMITIES: [ x ] No clubbing, No cyanosis,  [  ] edema B/L lower EXT. [ x ] PVD stasis skin changes B/L Lower EXT, [  ] wound + Lower Ext pain -  LYMPH: No lymphadenopathy noted  SKIN:  [  ] No rashes or lesions, [  ] Pressure Ulcers, [  ] ecchymosis, [  ] Skin Tears, [x  ] Other- dry skin, with Scabs all EXT    DIET: Soft & Bite Size , Moderately Thickened    LABS:                        10.7   8.58  )-----------( 186      ( 29 Nov 2022 07:35 )             32.7     29 Nov 2022 10:35    142    |  108    |  4      ----------------------------<  256    4.0     |  28     |  0.85     Ca    8.3        29 Nov 2022 10:35  Phos  2.2       29 Nov 2022 10:35  Mg     2.0       29 Nov 2022 10:35    TPro  5.5    /  Alb  2.1    /  TBili  0.5    /  DBili  x      /  AST  21     /  ALT  46     /  AlkPhos  86     29 Nov 2022 10:35          Culture Results:   >100,000 CFU/ml Klebsiella pneumoniae (11-24 @ 08:26)  Culture Results:   No growth to date. (11-24 @ 06:40)  Culture Results:   No growth to date. (11-24 @ 06:37)  Culture Results:   No Growth Final (11-22 @ 22:10)  Culture Results:   <10,000 CFU/mL Normal Urogenital Angle (11-22 @ 22:10)  Culture Results:   No Growth Final (11-22 @ 22:05)      Culture - Urine (collected 24 Nov 2022 08:26)  Source: Catheterized Catheterized  Final Report (27 Nov 2022 16:42):    >100,000 CFU/ml Klebsiella pneumoniae  Organism: Klebsiella pneumoniae (27 Nov 2022 16:42)  Organism: Klebsiella pneumoniae (27 Nov 2022 16:42)    Culture - Blood (collected 24 Nov 2022 06:40)  Source: .Blood Blood-Peripheral  Preliminary Report (25 Nov 2022 14:01):    No growth to date.    Culture - Blood (collected 24 Nov 2022 06:37)  Source: .Blood Blood-Peripheral  Preliminary Report (25 Nov 2022 14:01):    No growth to date.    Culture - Urine (collected 22 Nov 2022 22:10)  Source: Catheterized Catheterized  Final Report (24 Nov 2022 07:27):    <10,000 CFU/mL Normal Urogenital Angle    Culture - Blood (collected 22 Nov 2022 22:10)  Source: .Blood Blood-Venous  Final Report (28 Nov 2022 02:01):    No Growth Final    Culture - Blood (collected 22 Nov 2022 22:05)  Source: .Blood Blood-Venous  Final Report (28 Nov 2022 02:01):    No Growth Final         Anemia Panel:  Vitamin B12, Serum: 656 pg/mL (11-24-22 @ 06:37)      Thyroid Panel:    RADIOLOGY & ADDITIONAL TESTS: NONE      HEALTH ISSUES - PROBLEM Dx:  Fever    AMS (altered mental status)    HTN (hypertension)  You have a known history of hypertension, the medical term for high blood pressure. Untreated high blood pressure increases the strain on the heart and arteries, eventually causing organ damage. High blood pressure also increases the risk of heart failure, heart attack (myocardial infarction), stroke, and kidney failure.   - Continue to take your home medications to prevent the possible complications of uncontrolled hypertension.   - Please also follow up with your primary care physician on a regular basis to make sure your blood pressure continues to be well controlled.      Motor system disease    History of diabetes mellitus    Peripheral neuropathy    Major depression    Need for prophylactic measure    Dysphagia      Consultant(s) Notes Reviewed:  [x  ] YES     Care Discussed with [X] Consultants  [ x ] Patient  [x  ] Family [  ] HCP [  ]   [  ] Social Service  [x  ] RN, [  ] Physical Therapy,[  ] Palliative care team  DVT PPX: [  ] Lovenox, [  ] S C Heparin, [  ] Coumadin, [  ] Xarelto, [  ] Eliquis, [  ] Pradaxa, [  ] IV Heparin drip, [  ] SCD [  ] Contraindication 2 to GI Bleed,[  ] Ambulation [  ] Contraindicated 2 to  bleed [x] Contraindicated 2 to Brain Bleed  Advanced directive: [  ] None, [ x ] DNR/DNI

## 2022-11-29 NOTE — PROGRESS NOTE ADULT - SUBJECTIVE AND OBJECTIVE BOX
Neurology Follow up note    MELBA [id number]    HPI:  72M BPH CAD, HTN, HLD, Afib, SDH s/p mechanical fall, recurrent ICH when on AC brought today for weakness. History obtained from pt's wife Love and chart review given patient medical condition. Pt admitted from 10/19 to 10/22/2022 at [facility] for SHD s/p adexxa. Pt's wife endorses that patient has been progressively deteriorating, states that now only is reactive to painful stimulus, prior used to articulate some words. Patient completed 2 weeks of outpatient PT, has 8-10 hours nurse aid daily. Temp at home 99.9. Wife assist with feeding, was seen by neuro who start mirtazapine and sent for neurologic work up.     ED course  VS: Afebrile, HR: 96, BP: 154/97, SpO2: 97% RA RR 19  Labs significant for glucose 227, Alkaline phosphate 227, UA shows glucose 250  CT head showed chronic subdural hematomas overlying the left side of the posterior falx measuring up to 1.7 cm and 1.5 cm overlying the left temporal convexity.  Mild mass effect with sulcal effacement and partial compression of the  left lateral ventricle. No new acute intracranial hemorrhage.  EKG shows Afib with RVR @ 110 bpm, Right axis deviation    (23 Nov 2022 01:11)      Interval History; no new events    Patient is seen, chart was reviewed and case was discussed with the treatment team.  Pt is not in any distress.   Lying on bed comfortably.       Vital Signs Last 24 Hrs  T(C): 36.6 (29 Nov 2022 18:06), Max: 36.6 (29 Nov 2022 12:35)  T(F): 97.9 (29 Nov 2022 18:06), Max: 97.9 (29 Nov 2022 18:06)  HR: 73 (29 Nov 2022 18:06) (71 - 95)  BP: 152/100 (29 Nov 2022 18:06) (126/83 - 152/100)  BP(mean): --  RR: 18 (29 Nov 2022 18:06) (18 - 18)  SpO2: 94% (29 Nov 2022 18:06) (94% - 98%)    Parameters below as of 29 Nov 2022 12:35  Patient On (Oxygen Delivery Method): room air                      REVIEW OF SYSTEMS:    unobtainable 2/2 poor mentation  not in any distress    On Neurological Examination:    Mental Status - Pt awake smiling  after calling his name  barely  following any simple commands     Speech -  nodding/no    Cranial Nerves - Pupils 3 mm equal and reactive to light, extraocular eye movements intact.   Pt has no facial asymmetry.     Muscle tone -increased    Motor Exam - 3/5 of LUE; RUE 2/5  LE 1/5    Sensory Exam -  Pt withdraws all extremities equally on stimulation.      Deep tendon Reflexes - 2 plus all over.    Neck Supple -  Yes.     MEDICATIONS  (STANDING):  cefuroxime   Tablet 500 milliGRAM(s) Oral every 12 hours  dextrose 5% + sodium chloride 0.45% with potassium chloride 20 mEq/L 1000 milliLiter(s) (80 mL/Hr) IV Continuous <Continuous>  dextrose 5%. 1000 milliLiter(s) (100 mL/Hr) IV Continuous <Continuous>  dextrose 5%. 1000 milliLiter(s) (50 mL/Hr) IV Continuous <Continuous>  dextrose 50% Injectable 25 Gram(s) IV Push once  dextrose 50% Injectable 12.5 Gram(s) IV Push once  dextrose 50% Injectable 25 Gram(s) IV Push once  diltiazem    milliGRAM(s) Oral daily  gabapentin 400 milliGRAM(s) Oral three times a day  glucagon  Injectable 1 milliGRAM(s) IntraMuscular once  insulin lispro (ADMELOG) corrective regimen sliding scale   SubCutaneous every 6 hours  latanoprost 0.005% Ophthalmic Solution 1 Drop(s) Both EYES at bedtime  magnesium sulfate  IVPB 1 Gram(s) IV Intermittent once  methylphenidate 20 milliGRAM(s) Oral daily  methylphenidate 10 milliGRAM(s) Oral daily  potassium chloride  10 mEq/100 mL IVPB 10 milliEquivalent(s) IV Intermittent every 1 hour    MEDICATIONS  (PRN):  acetaminophen  Suppository .. 650 milliGRAM(s) Rectal every 6 hours PRN Temp greater or equal to 38C (100.4F)  aluminum hydroxide/magnesium hydroxide/simethicone Suspension 30 milliLiter(s) Oral every 4 hours PRN Dyspepsia  dextrose Oral Gel 15 Gram(s) Oral once PRN Blood Glucose LESS THAN 70 milliGRAM(s)/deciliter  ondansetron Injectable 4 milliGRAM(s) IV Push every 8 hours PRN Nausea and/or Vomiting      Allergies    No Known Allergies    Intolerances                                              10.7   8.58  )-----------( 186      ( 29 Nov 2022 07:35 )             32.7       Hemoglobin A1C:     Vitamin B12     RADIOLOGY  < from: MR Head No Cont (11.25.22 @ 16:15) >    ACC: [id number] EXAM:  MR BRAIN                          PROCEDURE DATE:  11/25/2022          INTERPRETATION:  MR of the brain without gadolinium contrast    CLINICAL INFORMATION:   CVA  hx of TBI/SDH/AMS    TECHNIQUE:   Sagittal and axial T1-weighted images, axial FLAIR images,   axial susceptibility weighted images, axial T2-weighted images and axial   diffusion weighted images of the brain were obtained.    COMPARISON:   CT head 11/22/2022, 10/20/2022 and previous available for   review.    FINDINGS:    BRAIN and CSF SPACES: The patient is again noted for multifocal subdural   hemorrhage distorting the left cerebral hemisphere. The larger component   extends along the lateral convexity from near the temporal tip to the   temporal occipitaljunction and measures up to 1.4 cm thickness when   measured perpendicular to the inner table of the calvarium. This   collection is heterogeneous in signal intensity within septations and   dependent layering. It displaces and compresses gyri of theleft temporal   lobe and largely effaces the temporal horn of the left lateral ventricle.   A noncontiguous collection overlies the left cerebral hemisphere medial   aspect along the left posterior falx. This collection measures up to 1.6   cm thickness. It displaces gyri of the left parietal lobe. This   collection has a thick rind that is low signal intensity on all pulse   sequences. This contributes to 2 deformity, displacement and partial   effacement of the atrium of the left lateral ventricle. This parafalcine   component is associated with downward extension to the left tentorium.   These hemorrhages are associated with only slight left-to-right   subfalcine herniation, less than 0.3 cm at the anterior septum.    Linear hyperintensities found within multiple cysts sulci of the left   cerebral hemisphere. This does not appear to be artifactual and is   consistent with subarachnoid space hemorrhage.    Focal encephalomalacia is again noted in the supraorbital right frontal   lobe and at the right temporal tip. Extensive gliotic signal   hyperintensity is noted on the long TR images throughout much of the   cerebral hemispheric white matter. There is asymmetric ex vacuo   dilatation of the right lateral ventricle particularly itstemporal horn.   Deep cerebral hemispheric white matter hyperintensity is also present in   the left cerebral hemisphere, consistent with considerable ischemic white   matter disease within each cerebral hemisphere. Similar ventral pontine   signal intensity also likely reflects long-standing ischemic white matter   disease.    VESSELS:   The vertebral and internal carotid arteries demonstrate   expected flow voids indicating their patency.  The right vertebral artery   is dominant caliber. The posterior circulation is tortuous.    HEAD AND NECK STRUCTURES:   The orbits are unremarkable.  Paranasal   sinuses are clear.  The nasal cavity demonstrates irregular deviation   nasal septum left-to-right.  The central skull base appears intact.  The  nasopharynx contains a small volume dependent fluid.  The temporal bones   appear clear of disease.  The calvarium demonstrates a right frontal   craniotomy defect.      IMPRESSION:    1. Left lateral convexity subacute to long-standing subdural hematoma,   unchanged from 11/22/2022, increased from 10/20/2022    2. Left posterior parafalcine subacute to long-standing subdural   hematoma, unchanged since 10/20/2022    3. Left posterior convexity subarachnoid space hemorrhage (favored over   artifact)    4. Right frontal and right temporal tip focal encephalomalacia consistent   with posttraumatic etiology    5. Severe cerebral hemispheric white matter abnormality likely reflects   both ischemia and posttraumatic injury    6. Diffuse brain volume loss greater than typical for age consistent with   Central cerebral hemispheric white matter volume loss    ALBERT TONY MD; Attending Radiologist  This document has been electronically signed. Nov 25 2022  4:52PM      ASSESSMENT AND PLAN:    seen for ams-multifactorial     Subacute left lateral convexity(temporo occipital) subdural hematoma  left parafalcine subacute sdh  ME  hx of TBI/SDH  FEVER;AP    transfer to  [facility] for possible surgical intervention;EEG  management dori kirkpatrick and patient wife  avoid ac

## 2022-11-29 NOTE — PROGRESS NOTE ADULT - PROBLEM SELECTOR PLAN 10
DVT ppx: hold AC in the setting of recurrent IPH. SCD's
DVT ppx: SCD hold AC in the setting of recurrent IPH. SCD's
DVT ppx: SCD hold AC in the setting of recurrent IPH. SCD's
DVT ppx: hold AC in the setting of recurrent IPH. SCD's
DVT ppx: SCD hold AC in the setting of recurrent IPH. SCD's
DVT ppx: hold AC in the setting of recurrent IPH. SCD's

## 2022-11-29 NOTE — PROGRESS NOTE ADULT - PROBLEM SELECTOR PROBLEM 2
AMS (altered mental status)

## 2022-11-29 NOTE — PROGRESS NOTE ADULT - PROBLEM SELECTOR PLAN 3
Pt admitted from 10/19 to 10/22/2022 at [facility] for SDH s/p s/p andexxa  Off of AC.   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  Mild mass effect with sulcal effacement and partial compression of the  left lateral ventricle. No new acute intracranial hemorrhage.  - Neuro Dr. Main, recs appreciated  Plan for Transfer to [facility] for further Eval & EEG Pt admitted from 10/19 to 10/22/2022 at [facility] for SDH s/p s/p andexxa  Off of AC.   - 11/22 CT: as above  - 11/25 MRI: as above  - Neuro Dr. Main, recs appreciated  - Plan for Transfer to [facility] for further Eval & EEG, awaiting bed

## 2022-11-29 NOTE — PROGRESS NOTE ADULT - PROBLEM SELECTOR PROBLEM 7
History of diabetes mellitus

## 2022-11-29 NOTE — PROGRESS NOTE ADULT - REASON FOR ADMISSION
acute on chronic AMS

## 2022-11-29 NOTE — PROGRESS NOTE ADULT - NUTRITIONAL ASSESSMENT
This patient has been assessed with a concern for Malnutrition and has been determined to have a diagnosis/diagnoses of Moderate protein-calorie malnutrition.    This patient is being managed with:   Diet NPO-  Except Medications  Entered: Nov 23 2022  2:07AM    
This patient has been assessed with a concern for Malnutrition and has been determined to have a diagnosis/diagnoses of Moderate protein-calorie malnutrition.    This patient is being managed with:   Diet Pureed-  Moderately Thick Liquids (MODTHICKLIQS)  Entered: Nov 25 2022 10:02AM    
This patient has been assessed with a concern for Malnutrition and has been determined to have a diagnosis/diagnoses of Moderate protein-calorie malnutrition.    This patient is being managed with:   Diet NPO-  Except Medications  Entered: Nov 23 2022  2:07AM    
This patient has been assessed with a concern for Malnutrition and has been determined to have a diagnosis/diagnoses of Moderate protein-calorie malnutrition.    This patient is being managed with:   Diet Soft and Bite Sized-  Moderately Thick Liquids (MODTHICKLIQS)  Entered: Nov 28 2022  1:49PM    
This patient has been assessed with a concern for Malnutrition and has been determined to have a diagnosis/diagnoses of Moderate protein-calorie malnutrition.    This patient is being managed with:   Diet Pureed-  Moderately Thick Liquids (MODTHICKLIQS)  Entered: Nov 25 2022 10:02AM    
This patient has been assessed with a concern for Malnutrition and has been determined to have a diagnosis/diagnoses of Moderate protein-calorie malnutrition.    This patient is being managed with:   Diet Pureed-  Moderately Thick Liquids (MODTHICKLIQS)  Supplement Feeding Modality:  Oral  Ensure Pudding Cans or Servings Per Day:  1       Frequency:  Three Times a day  Entered: Nov 27 2022 11:21AM

## 2022-11-29 NOTE — PROGRESS NOTE ADULT - PROBLEM SELECTOR PROBLEM 8
Caller: Chely Peoples    Relationship: Self    Best call back number: [phone]    Medication needed:   Requested Prescriptions     Pending Prescriptions Disp Refills   • Synthroid 125 MCG tablet 90 tablet 0     Sig: Take 1 tablet by mouth Daily.       When do you need the refill by: ASAP     What additional details did the patient provide when requesting the medication: OUT OF MEDICATION     Does the patient have less than a 3 day supply:  [x] Yes  [] No    What is the patient's preferred pharmacy: [facility] [address] [place] [address], [place] - [address]  - [phone] [facility] [phone] FX             
LOV - 01/12/21    LAST FILLED - 01/13/21    NEXT APPOINTMENT - 07/22/21  
Peripheral neuropathy

## 2022-11-29 NOTE — PROGRESS NOTE ADULT - ASSESSMENT
72 M BPH CAD, HTN, HLD, Afib, SDH s/p mechanical fall, recurrent ICH when on AC brought today for weakness. History obtained from pt's wife Love and chart review given patient medical condition. Pt admitted from 10/19 to 10/22/2022 at [facility] for SHD s/p adexxa. Pt's wife endorses that patient has been progressively deteriorating, states that now only is reactive to painful stimulus, prior used to articulate some words. Patient admitted for acute on chronic AMS.    
72 M BPH CAD, HTN, HLD, Afib, SDH s/p mechanical fall, recurrent ICH when on AC brought today for weakness. History obtained from pt's wife Love and chart review given patient medical condition. Pt admitted from 10/19 to 10/22/2022 at [facility] for SHD s/p adexxa. Pt's wife endorses that patient has been progressively deteriorating, states that now only is reactive to painful stimulus, prior used to articulate some words. Patient admitted for acute on chronic AMS.    
72 M BPH CAD, HTN, HLD, Afib, SDH s/p mechanical fall, recurrent ICH when on AC brought today for weakness. History obtained from pt's wife Love and chart review given patient medical condition. Pt admitted from 10/19 to 10/22/2022 at [facility] for SHD s/p adexxa. Pt's wife endorses that patient has been progressively deteriorating, states that now only is reactive to painful stimulus, prior used to articulate some words. Patient admitted for acute on chronic AMS.    
72M BPH CAD, HTN, HLD, Afib, SDH s/p mechanical fall, recurrent ICH when on AC brought today for weakness.  fever /leukocytosis  sepsis aspiration pneumonia     plan  wbc down   fever down   blood cx ntd   ua with pyuria  follow cx  cxr right infiltrate    cont zosyn day 2  aspiration precautions  trend wbc  monitor temp    mrsa screen neg  
72M BPH CAD, HTN, HLD, Afib, SDH s/p mechanical fall, recurrent ICH when on AC brought today for weakness.  fever /leukocytosis, sepsis aspiration pneumonia     RECOMMENDATIONS  Zosyn started 11/24 noon, can complete 5 days of Rx on 11/29 am Cefuroxime 500mg PO BID     reviewed imaging w family but as discussed does not seem as though PNA explains full picture so agree with neurology recs for transfer to [facility] for further eval and possible intervention    From an ID standpoint no further requirement for inpatient status for the management of ID issues. Fine with discharge from ID standpoint when other medical issues no longer require inpatient care and social issues allow for a safe discharge plan. To schedule an outpatient ID follow up appointment please call our office at [phone]    Thank you for consulting us and involving us in the management of this most interesting and challenging case.  Please call us at [phone] or text me directly on my cell#[phone] with any concerns or further questions.    
dysphagia    discussed nutrition with wife  he does not aspirate at home and has had pna very infrequently   monitor mental status  consider starting pleasure feeds  she is not interested in peg    I reviewed the overnight course of events on the unit, re-confirming the patient history. I discussed the care with the patient and their family  Differential diagnosis and plan of care discussed with patient after the evaluation  35 minutes spent on total encounter of which more than fifty percent of the encounter was spent counseling and/or coordinating care by the attending physician.  Advanced care planning was discussed with patient and family.  Advanced care planning forms were reviewed and discussed.  Risks, benefits and alternatives of gastroenterologic procedures were discussed in detail and all questions were answered.  
72M BPH CAD, HTN, HLD, Afib, SDH s/p mechanical fall, recurrent ICH when on AC brought today for weakness.  fever /leukocytosis, sepsis aspiration pneumonia     RECOMMENDATIONS  Zosyn started 11/24 noon, today day 4 so can complete 5 days of Rx on 11/29 am but will change to Cefuroxime 500mg PO BID and can look at dc planning    From an ID standpoint no further requirement for inpatient status for the management of ID issues. Fine with discharge from ID standpoint when other medical issues no longer require inpatient care and social issues allow for a safe discharge plan. To schedule an outpatient ID follow up appointment please call our office at [phone]    Thank you for consulting us and involving us in the management of this most interesting and challenging case.  Please call us at [phone] or text me directly on my cell#[phone] with any concerns or further questions.    
dysphagia    discussed nutrition with wife  he does not aspirate at home and has had pna very infrequently   monitor mental status  family does not want peg  diet per SLP recs    I reviewed the overnight course of events on the unit, re-confirming the patient history. I discussed the care with the patient and their family  Differential diagnosis and plan of care discussed with patient after the evaluation  35 minutes spent on total encounter of which more than fifty percent of the encounter was spent counseling and/or coordinating care by the attending physician.  Advanced care planning was discussed with patient and family.  Advanced care planning forms were reviewed and discussed.  Risks, benefits and alternatives of gastroenterologic procedures were discussed in detail and all questions were answered.
dysphagia    discussed nutrition with wife  he does not aspirate at home and has had pna very infrequently   monitor mental status  consider starting pleasure feeds  she is not interested in peg    I reviewed the overnight course of events on the unit, re-confirming the patient history. I discussed the care with the patient and their family  Differential diagnosis and plan of care discussed with patient after the evaluation  35 minutes spent on total encounter of which more than fifty percent of the encounter was spent counseling and/or coordinating care by the attending physician.  Advanced care planning was discussed with patient and family.  Advanced care planning forms were reviewed and discussed.  Risks, benefits and alternatives of gastroenterologic procedures were discussed in detail and all questions were answered.
72 M BPH CAD, HTN, HLD, Afib, SDH s/p mechanical fall, recurrent ICH when on AC brought today for weakness. History obtained from pt's wife Love and chart review given patient medical condition. Pt admitted from 10/19 to 10/22/2022 at [facility] for SHD s/p adexxa. Pt's wife endorses that patient has been progressively deteriorating, states that now only is reactive to painful stimulus, prior used to articulate some words. Patient admitted for acute on chronic AMS.    
72 M BPH CAD, HTN, HLD, Afib, SDH s/p mechanical fall, recurrent ICH when on AC brought today for weakness. History obtained from pt's wife Love and chart review given patient medical condition. Pt admitted from 10/19 to 10/22/2022 at [facility] for SHD s/p adexxa. Pt's wife endorses that patient has been progressively deteriorating, states that now only is reactive to painful stimulus, prior used to articulate some words. Patient admitted for acute on chronic AMS.    
72 M BPH CAD, HTN, HLD, Afib, SDH s/p mechanical fall, recurrent ICH when on AC brought today for weakness. History obtained from pt's wife Love and chart review given patient medical condition. Pt admitted from 10/19 to 10/22/2022 at [facility] for SHD s/p adexxa. Pt's wife endorses that patient has been progressively deteriorating, states that now only is reactive to painful stimulus, prior used to articulate some words. Patient admitted for acute on chronic AMS.

## 2022-11-29 NOTE — H&P ADULT - ASSESSMENT
72M hx BPH, CAD, HTN/HLD, Afib (eliquis held since SDH), R SDH evac in 9/2021 and more resent SDH s/p mechanical fall on 10/19 (s/p andexanet), initially presented to [facility] for increased lethargy, now txfrd to [facility]. At OSH he had fever /leukocytosis, urosepsis, PNA; abx completed today. CT's have been stable and low concern for subdural empyema given MRI does not show convincing diffusion restriction (shine through). Per wife, compared to baseline, he is more tired and requires more stimuli for verbal output. Exam: AO1, EOV, FC, EOMI, L gaze preference, minimal verbal outut, L droop, BUE spontaneous,  hand (L>R), BLE minimal spontaneous movement.  -ADM floor Dr. Whitehead  -no acute neurosurgical intervention  -Repeat CT head non-con tonight to r/u enlarging SDH  -If no new findings on CT, will discuss with medicine team in AM   -Speech and swallow consultation in AM prior to feeding attempts

## 2022-11-29 NOTE — PROGRESS NOTE ADULT - PROBLEM SELECTOR PROBLEM 6
Motor system disease

## 2022-11-29 NOTE — PROGRESS NOTE ADULT - ATTENDING COMMENTS
72 M BPH CAD, HTN, HLD, Afib, SDH s/p mechanical fall, recurrent ICH when on AC brought today for weakness. History obtained from pt's wife Love and chart review given patient medical condition. Pt admitted from 10/19 to 10/22/2022 at [facility] for SHD s/p adexxa. Pt's wife endorses that patient has been progressively deteriorating, states that now only is reactive to painful stimulus, prior used to articulate some words. Patient admitted for acute on chronic AMS.  Pt seen, examined, case & care plan d/w pt, residents at [facility].  Neurology-DR Main follow up -Pt for Transfer to [facility] for Neuro surgical Eval DR GONZALEZ.   -ID DR Manning -On IV Zosyn q 8 hrs --> Change to PO Abx   -GI-DR Kaye -NO PEG , S & S ---> MBS -partly done, Moderately Thicketed liquid soft & bite Size , Pt did NOT Cooperate with Liquid study   Palliative Care Eval -DNR/DNI .  D/W Wife at [facility] - transfer today to [facility]   -aspiration Precaution.  SCD   PT/OT follow up  Social service Eval.  Total care time 45 minutes.   Transfer pt to [facility] when bed available. wife aware.

## 2022-11-29 NOTE — PATIENT PROFILE ADULT - FALL HARM RISK - HARM RISK INTERVENTIONS

## 2022-11-29 NOTE — PROGRESS NOTE ADULT - PROBLEM SELECTOR PLAN 2
Acute on chronic, per wife pt has been progressively deteriorating, reactive to painful stimulus only, prior used to articulate some words.  -Unclear Etiology  ? SDH AMS improving   - CT head showed chronic subdural hematomas overlying the left side of the posterior falx measuring up to 1.7 cm and 1.5 cm overlying the left temporal convexity.  Mild mass effect with sulcal effacement and partial compression of the  left lateral ventricle. No new acute intracranial hemorrhage.  - Neuro Dr. Main recs: AMS/SDH,   - Dr. Main spoke to wife- plan for transfer to [facility] Neurosurgery for further management   - MRI done 11/25   - GI Dr. Salgado -NO PEG as per Family   - PT /OT consulted  - Palliative care consulted for GOC-DNR/DNI Acute on chronic, per wife pt has been progressively deteriorating, reactive to painful stimulus only, prior used to articulate some words.  -Unclear Etiology: Likely multifactorial infection/SDH/SAH   - 11/22 CT: Chronic SDH, no new acute intracranial hemorrhage.  - 11/25 MRI: Chronic SDHs, Left posterior convexity subarachnoid space hemorrhage (favored over artifact)  - Neuro Dr. Main recs: AMS/SDH  - Dr. Main spoke to wife- plan for transfer to [facility] Neurosurgery for further management   - GI Dr. Salgado -NO PEG as per Family, consider pleasure feeds  - Palliative care consulted for GOC-DNR/DNI Acute on chronic, per wife pt has been progressively deteriorating, reactive to painful stimulus only, prior used to articulate some words.  -Unclear Etiology: Likely multifactorial infection/SDH/SAH   - 11/22 CT: Chronic SDH, no new acute intracranial hemorrhage.  - 11/25 MRI: Chronic SDHs, Left posterior convexity subarachnoid space hemorrhage (favored over artifact)  - Neuro Dr. Main recs: AMS/SDH  - Dr. Main spoke to wife- plan for transfer to [facility] Neurosurgery for further management, EEG ordered while at [place]  - GI Dr. Salgado -NO PEG as per Family, consider pleasure feeds  - Palliative care consulted for GOC-DNR/DNI Acute on chronic, per wife pt has been progressively deteriorating, reactive to painful stimulus only, prior used to articulate some words.  -Unclear Etiology: Likely multifactorial infection /SDH /SAH   - 11/22 CT: Chronic SDH, no new acute intracranial hemorrhage.  - 11/25 MRI: Chronic SDHs, Left posterior convexity subarachnoid space hemorrhage (favored over artifact)  - Neuro Dr. Main recs: AMS/SDH- EEG   - Dr. Main spoke to wife- plan for transfer to [facility] Neurosurgery for further management, EEG ordered while at [place]  - GI Dr. Salgado -NO PEG as per Family, consider pleasure feeds  - Palliative care consulted for GOC-DNR/DNI

## 2022-11-29 NOTE — H&P ADULT - HISTORY OF PRESENT ILLNESS
72 male with a past medical history of BPH, CAD, HTN/HLD, Afib (eliquis held since SDH), R SDH evac in 9/2021 and more resent SDH s/p mechanical fall on 10/19 (s/p andexanet), initially presented to [facility] for increased lethargy. Now transferred to [facility] for further management.   At [facility] hospital he had fever/leukocytosis, urosepsis, PNA. Most recent course of abx was completed today.   CT's have been stable and show a low concern for subdural empyema given MRI does not show convincing diffusion restriction (shine through).   Per wife, compared to baseline, he is more tired and requires more stimuli for verbal output.   Will admit to floor and rule out enlarging subdural hematoma.

## 2022-11-29 NOTE — H&P ADULT - NSHPLABSRESULTS_GEN_ALL_CORE
.  LABS:                         10.7   8.58  )-----------( 186      ( 29 Nov 2022 07:35 )             32.7     11-29    142  |  108  |  4<L>  ----------------------------<  256<H>  4.0   |  28  |  0.85    Ca    8.3<L>      29 Nov 2022 10:35  Phos  2.2     11-29  Mg     2.0     11-29    TPro  5.5<L>  /  Alb  2.1<L>  /  TBili  0.5  /  DBili  x   /  AST  21  /  ALT  46  /  AlkPhos  86  11-29              RADIOLOGY, EKG & ADDITIONAL TESTS: Reviewed.

## 2022-11-30 ENCOUNTER — NON-APPOINTMENT (OUTPATIENT)
Age: 72
End: 2022-11-30

## 2022-11-30 DIAGNOSIS — I10 ESSENTIAL (PRIMARY) HYPERTENSION: ICD-10-CM

## 2022-11-30 DIAGNOSIS — Z29.9 ENCOUNTER FOR PROPHYLACTIC MEASURES, UNSPECIFIED: ICD-10-CM

## 2022-11-30 DIAGNOSIS — I48.91 UNSPECIFIED ATRIAL FIBRILLATION: ICD-10-CM

## 2022-11-30 DIAGNOSIS — S06.5XAA TRAUMATIC SUBDURAL HEMORRHAGE WITH LOSS OF CONSCIOUSNESS STATUS UNKNOWN, INITIAL ENCOUNTER: ICD-10-CM

## 2022-11-30 DIAGNOSIS — R41.82 ALTERED MENTAL STATUS, UNSPECIFIED: ICD-10-CM

## 2022-11-30 DIAGNOSIS — G62.9 POLYNEUROPATHY, UNSPECIFIED: ICD-10-CM

## 2022-11-30 DIAGNOSIS — F32.9 MAJOR DEPRESSIVE DISORDER, SINGLE EPISODE, UNSPECIFIED: ICD-10-CM

## 2022-11-30 DIAGNOSIS — E11.9 TYPE 2 DIABETES MELLITUS WITHOUT COMPLICATIONS: ICD-10-CM

## 2022-11-30 DIAGNOSIS — Z79.899 OTHER LONG TERM (CURRENT) DRUG THERAPY: ICD-10-CM

## 2022-11-30 LAB
ALBUMIN SERPL ELPH-MCNC: 3.1 G/DL — LOW (ref 3.3–5)
ALP SERPL-CCNC: 97 U/L — SIGNIFICANT CHANGE UP (ref 40–120)
ALT FLD-CCNC: 33 U/L — SIGNIFICANT CHANGE UP (ref 10–45)
ANION GAP SERPL CALC-SCNC: 10 MMOL/L — SIGNIFICANT CHANGE UP (ref 5–17)
AST SERPL-CCNC: 15 U/L — SIGNIFICANT CHANGE UP (ref 10–40)
BILIRUB SERPL-MCNC: 0.5 MG/DL — SIGNIFICANT CHANGE UP (ref 0.2–1.2)
BUN SERPL-MCNC: 5 MG/DL — LOW (ref 7–23)
CALCIUM SERPL-MCNC: 8.7 MG/DL — SIGNIFICANT CHANGE UP (ref 8.4–10.5)
CHLORIDE SERPL-SCNC: 105 MMOL/L — SIGNIFICANT CHANGE UP (ref 96–108)
CO2 SERPL-SCNC: 24 MMOL/L — SIGNIFICANT CHANGE UP (ref 22–31)
CREAT SERPL-MCNC: 0.72 MG/DL — SIGNIFICANT CHANGE UP (ref 0.5–1.3)
EGFR: 98 ML/MIN/1.73M2 — SIGNIFICANT CHANGE UP
GLUCOSE BLDC GLUCOMTR-MCNC: 127 MG/DL — HIGH (ref 70–99)
GLUCOSE BLDC GLUCOMTR-MCNC: 141 MG/DL — HIGH (ref 70–99)
GLUCOSE BLDC GLUCOMTR-MCNC: 147 MG/DL — HIGH (ref 70–99)
GLUCOSE BLDC GLUCOMTR-MCNC: 166 MG/DL — HIGH (ref 70–99)
GLUCOSE BLDC GLUCOMTR-MCNC: 169 MG/DL — HIGH (ref 70–99)
GLUCOSE SERPL-MCNC: 158 MG/DL — HIGH (ref 70–99)
HCT VFR BLD CALC: 36 % — LOW (ref 39–50)
HGB BLD-MCNC: 11.5 G/DL — LOW (ref 13–17)
MCHC RBC-ENTMCNC: 25.2 PG — LOW (ref 27–34)
MCHC RBC-ENTMCNC: 31.9 GM/DL — LOW (ref 32–36)
MCV RBC AUTO: 78.8 FL — LOW (ref 80–100)
NRBC # BLD: 0 /100 WBCS — SIGNIFICANT CHANGE UP (ref 0–0)
PLATELET # BLD AUTO: 122 K/UL — LOW (ref 150–400)
POTASSIUM SERPL-MCNC: 4 MMOL/L — SIGNIFICANT CHANGE UP (ref 3.5–5.3)
POTASSIUM SERPL-SCNC: 4 MMOL/L — SIGNIFICANT CHANGE UP (ref 3.5–5.3)
PROT SERPL-MCNC: 5.9 G/DL — LOW (ref 6–8.3)
RBC # BLD: 4.57 M/UL — SIGNIFICANT CHANGE UP (ref 4.2–5.8)
RBC # FLD: 16.3 % — HIGH (ref 10.3–14.5)
SODIUM SERPL-SCNC: 139 MMOL/L — SIGNIFICANT CHANGE UP (ref 135–145)
WBC # BLD: 9.12 K/UL — SIGNIFICANT CHANGE UP (ref 3.8–10.5)
WBC # FLD AUTO: 9.12 K/UL — SIGNIFICANT CHANGE UP (ref 3.8–10.5)

## 2022-11-30 PROCEDURE — 71045 X-RAY EXAM CHEST 1 VIEW: CPT | Mod: 26

## 2022-11-30 PROCEDURE — 99223 1ST HOSP IP/OBS HIGH 75: CPT

## 2022-11-30 PROCEDURE — 95720 EEG PHY/QHP EA INCR W/VEEG: CPT

## 2022-11-30 PROCEDURE — 99232 SBSQ HOSP IP/OBS MODERATE 35: CPT

## 2022-11-30 RX ORDER — ATORVASTATIN CALCIUM 80 MG/1
40 TABLET, FILM COATED ORAL AT BEDTIME
Refills: 0 | Status: DISCONTINUED | OUTPATIENT
Start: 2022-11-30 | End: 2022-12-07

## 2022-11-30 RX ORDER — MIRTAZAPINE 45 MG/1
7.5 TABLET, ORALLY DISINTEGRATING ORAL AT BEDTIME
Refills: 0 | Status: DISCONTINUED | OUTPATIENT
Start: 2022-11-30 | End: 2022-12-07

## 2022-11-30 RX ORDER — CHOLECALCIFEROL (VITAMIN D3) 125 MCG
1000 CAPSULE ORAL DAILY
Refills: 0 | Status: DISCONTINUED | OUTPATIENT
Start: 2022-11-30 | End: 2022-12-07

## 2022-11-30 RX ORDER — METHYLPHENIDATE HCL 5 MG
2 TABLET ORAL
Qty: 0 | Refills: 0 | DISCHARGE

## 2022-11-30 RX ORDER — SODIUM CHLORIDE 9 MG/ML
1000 INJECTION INTRAMUSCULAR; INTRAVENOUS; SUBCUTANEOUS
Refills: 0 | Status: DISCONTINUED | OUTPATIENT
Start: 2022-11-30 | End: 2022-12-07

## 2022-11-30 RX ORDER — LABETALOL HCL 100 MG
2.5 TABLET ORAL ONCE
Refills: 0 | Status: COMPLETED | OUTPATIENT
Start: 2022-11-30 | End: 2022-11-30

## 2022-11-30 RX ORDER — LOSARTAN POTASSIUM 100 MG/1
50 TABLET, FILM COATED ORAL DAILY
Refills: 0 | Status: DISCONTINUED | OUTPATIENT
Start: 2022-11-30 | End: 2022-12-01

## 2022-11-30 RX ORDER — ENOXAPARIN SODIUM 100 MG/ML
40 INJECTION SUBCUTANEOUS
Refills: 0 | Status: DISCONTINUED | OUTPATIENT
Start: 2022-11-30 | End: 2022-12-02

## 2022-11-30 RX ORDER — HYDRALAZINE HCL 50 MG
20 TABLET ORAL THREE TIMES A DAY
Refills: 0 | Status: DISCONTINUED | OUTPATIENT
Start: 2022-11-30 | End: 2022-12-01

## 2022-11-30 RX ADMIN — Medication 2.5 MILLIGRAM(S): at 13:00

## 2022-11-30 RX ADMIN — Medication 2: at 06:44

## 2022-11-30 RX ADMIN — ATORVASTATIN CALCIUM 40 MILLIGRAM(S): 80 TABLET, FILM COATED ORAL at 22:30

## 2022-11-30 RX ADMIN — SODIUM CHLORIDE 60 MILLILITER(S): 9 INJECTION INTRAMUSCULAR; INTRAVENOUS; SUBCUTANEOUS at 15:06

## 2022-11-30 RX ADMIN — LATANOPROST 1 DROP(S): 0.05 SOLUTION/ DROPS OPHTHALMIC; TOPICAL at 23:00

## 2022-11-30 RX ADMIN — ENOXAPARIN SODIUM 40 MILLIGRAM(S): 100 INJECTION SUBCUTANEOUS at 17:10

## 2022-11-30 RX ADMIN — GABAPENTIN 400 MILLIGRAM(S): 400 CAPSULE ORAL at 12:37

## 2022-11-30 RX ADMIN — Medication 20 MILLIGRAM(S): at 08:46

## 2022-11-30 RX ADMIN — Medication 20 MILLIGRAM(S): at 22:30

## 2022-11-30 RX ADMIN — GABAPENTIN 400 MILLIGRAM(S): 400 CAPSULE ORAL at 23:00

## 2022-11-30 NOTE — CONSULT NOTE ADULT - SUBJECTIVE AND OBJECTIVE BOX
HPI: Patient is a 72y male with history of afib, cva, tbi from bleeds related to fall while on eliquis. He had a recent stay for sdh but did not need surgery. He was admitted to [facility] about a week ago for generalized weakness, sepsis , found to have aspiration pneumonia, had zosyn for about 4 days then yesterday changed to po ceftin to finish course of tx for pneumonia today. He has no further fever. I cannot get any information from him. He is now transferred to [facility] for further evaluation by neurosurgery who deem no surgery. I cannot get any information from the patient     REVIEW OF SYSTEMS:  All other review of systems negative (Comprehensive ROS) cannot get    PAST MEDICAL & SURGICAL HISTORY:  TBI (traumatic brain injury)      HTN (hypertension)      Atrial fibrillation      Peripheral neuropathy      Major depression      HLD (hyperlipidemia)      CAD (coronary artery disease)      BPH (benign prostatic hyperplasia)      Pneumonia due to COVID-19 virus  June 2022      Hemorrhage in the brain      No significant past surgical history          Allergies    No Known Allergies    Intolerances        Antimicrobials Day #  :    Other Medications:  acetaminophen     Tablet .. 650 milliGRAM(s) Oral every 6 hours PRN  aluminum hydroxide/magnesium hydroxide/simethicone Suspension 30 milliLiter(s) Oral every 4 hours PRN  atorvastatin 40 milliGRAM(s) Oral at bedtime  cholecalciferol 1000 Unit(s) Oral daily  dextrose 5%. 1000 milliLiter(s) IV Continuous <Continuous>  dextrose 5%. 1000 milliLiter(s) IV Continuous <Continuous>  dextrose 50% Injectable 25 Gram(s) IV Push once  dextrose 50% Injectable 12.5 Gram(s) IV Push once  dextrose 50% Injectable 25 Gram(s) IV Push once  dextrose Oral Gel 15 Gram(s) Oral once PRN  diltiazem    milliGRAM(s) Oral daily  enoxaparin Injectable 40 milliGRAM(s) SubCutaneous <User Schedule>  gabapentin 400 milliGRAM(s) Oral three times a day  glucagon  Injectable 1 milliGRAM(s) IntraMuscular once  hydrALAZINE Injectable 20 milliGRAM(s) IV Push three times a day  insulin lispro (ADMELOG) corrective regimen sliding scale   SubCutaneous three times a day before meals  latanoprost 0.005% Ophthalmic Solution 1 Drop(s) Both EYES at bedtime  losartan 50 milliGRAM(s) Oral daily  methylphenidate 20 milliGRAM(s) Oral <User Schedule>  mirtazapine 7.5 milliGRAM(s) Oral at bedtime  ondansetron Injectable 4 milliGRAM(s) IV Push once PRN  sodium chloride 0.9%. 1000 milliLiter(s) IV Continuous <Continuous>      FAMILY HISTORY:      SOCIAL HISTORY:  Smoking: [ ]Yes [x ]No  ETOH: [ ]Yes [ ]xNo  Drug Use: [ ]Yes [ ]Nxo   [ x] Single[ ]    T(F): 98.4 (11-30-22 @ 18:00), Max: 98.4 (11-29-22 @ 21:15)  HR: 100 (11-30-22 @ 18:00)  BP: 165/101 (11-30-22 @ 18:00)  RR: 19 (11-30-22 @ 18:00)  SpO2: 96% (11-30-22 @ 18:00)  Wt(kg): --    PHYSICAL EXAM:  General: alert, no acute distress  Eyes:  anicteric, no conjunctival injection, no discharge  Oropharynx: no lesions or injection 	  Neck: supple, without adenopathy  Lungs: clear to auscultation  Heart: regular rate and rhythm; no murmur, rubs or gallops  Abdomen: soft, nondistended, nontender, without mass or organomegaly  Skin: no lesions  Extremities: no clubbing, cyanosis, or edema  Neurologic: awake, not really talking much    LAB RESULTS:                        11.5   9.12  )-----------( 122      ( 30 Nov 2022 08:18 )             36.0     11-30    139  |  105  |  5<L>  ----------------------------<  158<H>  4.0   |  24  |  0.72    Ca    8.7      30 Nov 2022 08:18  Phos  2.2     11-29  Mg     2.0     11-29    TPro  5.9<L>  /  Alb  3.1<L>  /  TBili  0.5  /  DBili  x   /  AST  15  /  ALT  33  /  AlkPhos  97  11-30    LIVER FUNCTIONS - ( 30 Nov 2022 08:18 )  Alb: 3.1 g/dL / Pro: 5.9 g/dL / ALK PHOS: 97 U/L / ALT: 33 U/L / AST: 15 U/L / GGT: x               MICROBIOLOGY:  RECENT CULTURES:        RADIOLOGY REVIEWED:  < from: CT Head No Cont (11.29.22 @ 22:43) >    ACC: [id number] EXAM:  CT BRAIN                          PROCEDURE DATE:  11/29/2022          INTERPRETATION:  Noncontrast CT of the brain.    CLINICAL INDICATION:  Follow-up for subdural hematoma    TECHNIQUE : Axial CT scanning of the brain was obtained from the skull   base to the vertex without the administration of intravenous contrast.    COMPARISON: Brain CT dated 11/22/2022    FINDINGS:  Redemonstrated is a left temporal convexity predominantly low   density subdural collection which has decreased in size from 1.4 cm to   9.5 mm.    There is decreasing size of left para falcine subdural decreased from 1.5   cm to 1.2 cm    Redemonstrated is right frontal and right temporal lobe encephalomalacia   and gliosis with ex vacuo dilatation of the right frontal and temporal   horns.    Right frontal craniotomy.    Age-appropriate involutional changes and moderate microvascular ischemic   changes are present.    The orbits are not remarkable in appearance.    The visualized paranasal sinuses and tympanomastoid cavities are free of   acute disease.    Impression:    Decreasing size of left temporal convexity and left para falcine chronic   subdural hematomas.    --- End of Report ---            MANDO STALLWORTH MD; Attending Radiologist  This document has been electronically signed. Nov 30 2022  8:58AM    < end of copied text >          Impression:  Patient with history of afib, right frontal cva, has had sdh twice , most recent last month but no surgery , so now off eliquis. He was readmitted to [facility] last week for weakness, sepsis, found to have pneumonia and has now finished full course of antibiotics for that. Not clear why he was transferred here but no further active ID issues         Recommendations:  monitor off antibiotics  aspiration precautions

## 2022-11-30 NOTE — CHART NOTE - NSCHARTNOTEFT_GEN_A_CORE
EEG preliminary read (not final) on the initial recording hour(s) = x 2 hrs    No seizures or epileptiform abnormalities recorded.  Mild slowing noted, nonspecific.    [facility] EEG Reading Room Ph#: [phone]  Epilepsy Answering Service after 5PM and before 8:30AM: Ph#: [phone]

## 2022-11-30 NOTE — SWALLOW BEDSIDE ASSESSMENT ADULT - SLP GENERAL OBSERVATIONS
Encountered Pt sitting upright in bed on RA. Pt is A&Ox1 (name only), minimally verbal, and able to follow simple commands. Pt requires intermittent encouragement and repetition to follow simple commands 2/2 cognitive deficits.

## 2022-11-30 NOTE — PROGRESS NOTE ADULT - SUBJECTIVE AND OBJECTIVE BOX
SUBJECTIVE:   Patient was seen and evaluated at bedside. Patient is resting in bed and is in no new acute distress.   OVERNIGHT EVENTS:   none   Vital Signs Last 24 Hrs  T(C): 36.6 (30 Nov 2022 09:10), Max: 36.9 (29 Nov 2022 21:15)  T(F): 97.8 (30 Nov 2022 09:10), Max: 98.4 (29 Nov 2022 21:15)  HR: 96 (30 Nov 2022 09:10) (71 - 100)  BP: 157/96 (30 Nov 2022 09:10) (126/83 - 157/96)  BP(mean): --  RR: 20 (30 Nov 2022 09:10) (18 - 20)  SpO2: 97% (30 Nov 2022 09:10) (94% - 98%)    Parameters below as of 30 Nov 2022 09:10  Patient On (Oxygen Delivery Method): room air        PHYSICAL EXAM:    General: No Acute Distress     Neurological: awake, non verbal, follows some simple commands on left upper and lowers, left side 4/5, right side 3/5 , no facial weakness noted. sensation is stable.   Pulmonary: Clear to Auscultation, No Rales, No Rhonchi, No Wheezes     Cardiovascular: S1, S2, Regular Rate and Rhythm     Gastrointestinal: Soft, Nontender, Nondistended     Incision: none     LABS:                        11.5   9.12  )-----------( 122      ( 30 Nov 2022 08:18 )             36.0    11-30    139  |  105  |  5<L>  ----------------------------<  158<H>  4.0   |  24  |  0.72    Ca    8.7      30 Nov 2022 08:18  Phos  2.2     11-29  Mg     2.0     11-29    TPro  5.9<L>  /  Alb  3.1<L>  /  TBili  0.5  /  DBili  x   /  AST  15  /  ALT  33  /  AlkPhos  97  11-30        DRAINS:     MEDICATIONS:  Antibiotics:    Neuro:  acetaminophen     Tablet .. 650 milliGRAM(s) Oral every 6 hours PRN Mild Pain (1 - 3)  gabapentin 400 milliGRAM(s) Oral three times a day  methylphenidate 20 milliGRAM(s) Oral <User Schedule>  methylphenidate 10 milliGRAM(s) Oral <User Schedule>  ondansetron Injectable 4 milliGRAM(s) IV Push once PRN Nausea    Cardiac:  diltiazem    milliGRAM(s) Oral daily    Pulm:    GI/:  aluminum hydroxide/magnesium hydroxide/simethicone Suspension 30 milliLiter(s) Oral every 4 hours PRN Dyspepsia    Other:   dextrose 5% + sodium chloride 0.45%. 1000 milliLiter(s) IV Continuous <Continuous>  dextrose 5%. 1000 milliLiter(s) IV Continuous <Continuous>  dextrose 5%. 1000 milliLiter(s) IV Continuous <Continuous>  dextrose 50% Injectable 25 Gram(s) IV Push once  dextrose 50% Injectable 12.5 Gram(s) IV Push once  dextrose 50% Injectable 25 Gram(s) IV Push once  dextrose Oral Gel 15 Gram(s) Oral once PRN Blood Glucose LESS THAN 70 milliGRAM(s)/deciliter  glucagon  Injectable 1 milliGRAM(s) IntraMuscular once  insulin lispro (ADMELOG) corrective regimen sliding scale   SubCutaneous three times a day before meals  insulin lispro (ADMELOG) corrective regimen sliding scale   SubCutaneous at bedtime  insulin lispro (ADMELOG) corrective regimen sliding scale   SubCutaneous every 6 hours  latanoprost 0.005% Ophthalmic Solution 1 Drop(s) Both EYES at bedtime    DIET: [] Regular [] CCD [] Renal [] Puree [] Dysphagia [] Tube Feeds:   npo   IMAGING:   ACC: [id number] EXAM:  CT BRAIN                          PROCEDURE DATE:  11/29/2022          INTERPRETATION:  Noncontrast CT of the brain.    CLINICAL INDICATION:  Follow-up for subdural hematoma    TECHNIQUE : Axial CT scanning of the brain was obtained from the skull   base to the vertex without the administration of intravenous contrast.    COMPARISON: Brain CT dated 11/22/2022    FINDINGS:  Redemonstrated is a left temporal convexity predominantly low   density subdural collection which has decreased in size from 1.4 cm to   9.5 mm.    There is decreasing size of left para falcine subdural decreased from 1.5   cm to 1.2 cm    Redemonstrated is right frontal and right temporal lobe encephalomalacia   and gliosis with ex vacuo dilatation of the right frontal and temporal   horns.    Right frontal craniotomy.    Age-appropriate involutional changes and moderate microvascular ischemic   changes are present.    The orbits are not remarkable in appearance.    The visualized paranasal sinuses and tympanomastoid cavities are free of   acute disease.    Impression:    Decreasing size of left temporal convexity and left para falcine chronic   subdural hematomas.    --- End of Report ---            MANDO STALLWORTH MD; Attending Radiologist  This document has been electronically signed. Nov 30 2022  8:58AM

## 2022-11-30 NOTE — SWALLOW BEDSIDE ASSESSMENT ADULT - COMMENTS
IMAGIN/29 CT head: Impression: Decreasing size of left temporal convexity and left para falcine chronic subdural hematomas.    ***Pt is known to this service w/ MBS completed 22. Per MBS report, defer continuation of PO diet level to MD given today's limited assessment due to the patient's inability to consistently trigger a pharyngeal swallow, despite max cues, ultimately requiring yankauer suctioning to safely remove bolus from oral cavity.

## 2022-11-30 NOTE — PROGRESS NOTE ADULT - PROBLEM SELECTOR PLAN 5
- Hb A1C 9.3  - home regimen  - c/w RHONDA  - FS [facility] QHS  - carb- consistent diet  - Endocrinology consult in AM - Hb A1C 9.3  - not on any medications at this time ; was previously on Metformin ; last took it about 1 year ago  - c/w RHONDA  - [facility]  - carb- consistent diet  - Endocrinology consult in AM - Hb A1C 9.3  - not on any medications at this time ; was previously on Metformin ; last took it about 1 year ago  - c/w RHONDA  - FS [facility] QHS  - carb- consistent diet  - Endocrinology consulted (via email)

## 2022-11-30 NOTE — PROGRESS NOTE ADULT - ASSESSMENT
HPI:  72 male with a past medical history of BPH, CAD, HTN/HLD, Afib (eliquis held since SDH), R SDH evac in 9/2021 and more resent SDH s/p mechanical fall on 10/19 (s/p andexanet), initially presented to \[facility]\"" for increased lethargy. Now transferred to [facility] for further management.   At \[facility]\"" hospital he had fever/leukocytosis, urosepsis, PNA. Most recent course of abx was completed today.   CT's have been stable and show a low concern for subdural empyema given MRI does not show convincing diffusion restriction (shine through).   Per wife, compared to baseline, he is more tired and requires more stimuli for verbal output.   Will admit to floor and rule out enlarging subdural hematoma. (29 Nov 2022 20:23)    PROCEDURE:  no surgical intervention on this admission.     PLAN:  Neuro:   1 ct head on 11/29 stable   2 eeg : in progress   3 on ritalin po     Respiratory:   1 room air     CV:  1 bp stable     Endocrine:   1 stable     Heme/Onc:             DVT ppx: scds  and sql   1 stable   2 le doppler :p   Renal:   1 voiding   2 on iv fluids     ID:   1 afebrile   2 +uti at outside hospital with pna s/p 5 days of abx   3 id to see here     GI:   1 npo  2 swallow consult :p     Social/Family:   Discharge planning: p     -will discuss with Dr. Charley lunsford [id number]

## 2022-11-30 NOTE — SWALLOW BEDSIDE ASSESSMENT ADULT - PHARYNGEAL PHASE
No overt s/s of laryngeal penetration/aspiration noted/Delayed pharyngeal swallow/Decreased laryngeal elevation No overt s/s of laryngeal penetration/aspiration noted/Delayed pharyngeal swallow/Decreased laryngeal elevation/Wet vocal quality post oral intake

## 2022-11-30 NOTE — PROGRESS NOTE ADULT - SUBJECTIVE AND OBJECTIVE BOX
ACCEPTANCE NOTE  72M with a past medical history of  CAD, HTN, HLD, Afib (Eliquis held since SDH), R SDH evac in 9/2021 and more resent SDH s/p mechanical fall on 10/19 (s/p andexanet), initially presented to [facility]  for increased lethargy.  Per wife, compared to baseline, he is more tired and requires more stimuli for verbal output.   At \[facility]\"" hospital pt found to have  fever/leukocytosis, UTI, PNA. Completed abx course ( Zosyn then ) today 11/30/22. Now transferred to [facility] for further management.  Pt was admitted to NSGY service to rule out enlarging subdural hematoma. Repeat head CT's have been stable and show a low concern for subdural empyema. MRI does not show convincing diffusion restriction. No plans for surgical intervention per NSGY ; pt is for transfer to the Medicine service.  Unable to obtain ROS as pt is minimally verbal.      PAST MEDICAL & SURGICAL HISTORY:  TBI (traumatic brain injury)      HTN (hypertension)      Atrial fibrillation      Peripheral neuropathy      Major depression      HLD (hyperlipidemia)      CAD (coronary artery disease)      BPH (benign prostatic hyperplasia)      Pneumonia due to COVID-19 virus  June 2022      Hemorrhage in the brain      No significant past surgical history          Review of Systems:   Unable to obtain on account of AMS    Allergies    No Known Allergies    Intolerances        Social History:     FAMILY HISTORY:      MEDICATIONS  (STANDING):  dextrose 5%. 1000 milliLiter(s) (50 mL/Hr) IV Continuous <Continuous>  dextrose 5%. 1000 milliLiter(s) (100 mL/Hr) IV Continuous <Continuous>  dextrose 50% Injectable 25 Gram(s) IV Push once  dextrose 50% Injectable 12.5 Gram(s) IV Push once  dextrose 50% Injectable 25 Gram(s) IV Push once  diltiazem    milliGRAM(s) Oral daily  enoxaparin Injectable 40 milliGRAM(s) SubCutaneous <User Schedule>  gabapentin 400 milliGRAM(s) Oral three times a day  glucagon  Injectable 1 milliGRAM(s) IntraMuscular once  insulin lispro (ADMELOG) corrective regimen sliding scale   SubCutaneous three times a day before meals  insulin lispro (ADMELOG) corrective regimen sliding scale   SubCutaneous at bedtime  latanoprost 0.005% Ophthalmic Solution 1 Drop(s) Both EYES at bedtime  methylphenidate 20 milliGRAM(s) Oral <User Schedule>  methylphenidate 10 milliGRAM(s) Oral <User Schedule>  sodium chloride 0.9%. 1000 milliLiter(s) (60 mL/Hr) IV Continuous <Continuous>    MEDICATIONS  (PRN):  acetaminophen     Tablet .. 650 milliGRAM(s) Oral every 6 hours PRN Mild Pain (1 - 3)  aluminum hydroxide/magnesium hydroxide/simethicone Suspension 30 milliLiter(s) Oral every 4 hours PRN Dyspepsia  dextrose Oral Gel 15 Gram(s) Oral once PRN Blood Glucose LESS THAN 70 milliGRAM(s)/deciliter  ondansetron Injectable 4 milliGRAM(s) IV Push once PRN Nausea      Vital Signs Last 24 Hrs  T(C): 36.9 (30 Nov 2022 12:49), Max: 36.9 (29 Nov 2022 21:15)  T(F): 98.4 (30 Nov 2022 12:49), Max: 98.4 (29 Nov 2022 21:15)  HR: 102 (30 Nov 2022 12:49) (73 - 102)  BP: 159/103 (30 Nov 2022 12:49) (143/101 - 159/103)  BP(mean): --  RR: 20 (30 Nov 2022 12:49) (18 - 20)  SpO2: 98% (30 Nov 2022 12:49) (94% - 98%)    Parameters below as of 30 Nov 2022 12:49  Patient On (Oxygen Delivery Method): room air      CAPILLARY BLOOD GLUCOSE      POCT Blood Glucose.: 147 mg/dL (30 Nov 2022 11:31)  POCT Blood Glucose.: 166 mg/dL (30 Nov 2022 06:41)  POCT Blood Glucose.: 169 mg/dL (30 Nov 2022 05:33)  POCT Blood Glucose.: 128 mg/dL (29 Nov 2022 22:01)    I&O's Summary      PHYSICAL EXAM:  GENERAL: NAD, well-groomed  HEAD:  Atraumatic, Normocephalic ; EEG in progress  EYES: EOMI, L gaze preference, conjunctiva and sclera clear  NECK: Supple, No JVD  CHEST/LUNG: Clear to auscultation bilaterally; No wheeze  HEART: Regular rate and rhythm; No murmurs, rubs, or gallops  ABDOMEN: Soft, Nontender, Nondistended; Bowel sounds present  EXTREMITIES:  2+ Peripheral Pulses, No clubbing, cyanosis, or edema  PSYCH: flat affect  NEUROLOGY: AO x1, EOV, FC, EOMI, L gaze preference, minimal verbal outut, L droop, BUE spontaneous,  hand (L>R), BLE minimal spontaneous movement.  SKIN: No rashes or lesions    LABS:                        11.5   9.12  )-----------( 122      ( 30 Nov 2022 08:18 )             36.0     11-30    139  |  105  |  5<L>  ----------------------------<  158<H>  4.0   |  24  |  0.72    Ca    8.7      30 Nov 2022 08:18  Phos  2.2     11-29  Mg     2.0     11-29    TPro  5.9<L>  /  Alb  3.1<L>  /  TBili  0.5  /  DBili  x   /  AST  15  /  ALT  33  /  AlkPhos  97  11-30              RADIOLOGY & ADDITIONAL TESTS:    Imaging Personally Reviewed:  CT HEAD NON CON 11/29  Decreasing size of left temporal convexity and left para falcine chronic   subdural hematomas.    MRI BRAIN 11/25  1. Left lateral convexity subacute to long-standing subdural hematoma,   unchanged from 11/22/2022, increased from 10/20/2022    2. Left posterior parafalcine subacute to long-standing subdural   hematoma, unchanged since 10/20/2022    3. Left posterior convexity subarachnoid space hemorrhage (favored over   artifact)    4. Right frontal and right temporal tip focal encephalomalacia consistent   with posttraumatic etiology    5. Severe cerebral hemispheric white matter abnormality likely reflects   both ischemia and posttraumatic injury    6. Diffuse brain volume loss greater than typical for age consistent with   Central cerebral hemispheric white matter volume loss        Consultant(s) Notes Reviewed:  NSGY    Care Discussed with Consultants/Other Providers: NSGY   ACCEPTANCE NOTE  72M with a past medical history of  CAD, HTN, HLD, Afib (Eliquis held since SDH), R SDH evac in 9/2021 and more resent SDH s/p mechanical fall on 10/19 (s/p andexanet), initially presented to [facility]  for increased lethargy.  Per wife, compared to baseline, he is more tired and requires more stimuli for verbal output.   At [facility] hospital pt found to have  fever/leukocytosis, UTI, PNA. Completed abx course today ( Zosyn then 11/24- 1/28 ; Cefuroxime 11/28- 11/30 ). Now transferred to [facility] for further management.  Pt was admitted to NSGY service to rule out enlarging subdural hematoma. Repeat head CT's have been stable and show a low concern for subdural empyema. MRI does not show convincing diffusion restriction. No plans for surgical intervention per NSGY ; pt is for transfer to the Medicine service.  Unable to obtain ROS as pt is minimally verbal. Appears comfortable.      PAST MEDICAL & SURGICAL HISTORY:  TBI (traumatic brain injury)      HTN (hypertension)      Atrial fibrillation      Peripheral neuropathy      Major depression      HLD (hyperlipidemia)      CAD (coronary artery disease)      BPH (benign prostatic hyperplasia)      Pneumonia due to COVID-19 virus  June 2022      Hemorrhage in the brain      No significant past surgical history          Review of Systems:   Unable to obtain on account of AMS    Allergies    No Known Allergies    Intolerances        Social History:     FAMILY HISTORY:      MEDICATIONS  (STANDING):  dextrose 5%. 1000 milliLiter(s) (50 mL/Hr) IV Continuous <Continuous>  dextrose 5%. 1000 milliLiter(s) (100 mL/Hr) IV Continuous <Continuous>  dextrose 50% Injectable 25 Gram(s) IV Push once  dextrose 50% Injectable 12.5 Gram(s) IV Push once  dextrose 50% Injectable 25 Gram(s) IV Push once  diltiazem    milliGRAM(s) Oral daily  enoxaparin Injectable 40 milliGRAM(s) SubCutaneous <User Schedule>  gabapentin 400 milliGRAM(s) Oral three times a day  glucagon  Injectable 1 milliGRAM(s) IntraMuscular once  insulin lispro (ADMELOG) corrective regimen sliding scale   SubCutaneous three times a day before meals  insulin lispro (ADMELOG) corrective regimen sliding scale   SubCutaneous at bedtime  latanoprost 0.005% Ophthalmic Solution 1 Drop(s) Both EYES at bedtime  methylphenidate 20 milliGRAM(s) Oral <User Schedule>  methylphenidate 10 milliGRAM(s) Oral <User Schedule>  sodium chloride 0.9%. 1000 milliLiter(s) (60 mL/Hr) IV Continuous <Continuous>    MEDICATIONS  (PRN):  acetaminophen     Tablet .. 650 milliGRAM(s) Oral every 6 hours PRN Mild Pain (1 - 3)  aluminum hydroxide/magnesium hydroxide/simethicone Suspension 30 milliLiter(s) Oral every 4 hours PRN Dyspepsia  dextrose Oral Gel 15 Gram(s) Oral once PRN Blood Glucose LESS THAN 70 milliGRAM(s)/deciliter  ondansetron Injectable 4 milliGRAM(s) IV Push once PRN Nausea      Vital Signs Last 24 Hrs  T(C): 36.9 (30 Nov 2022 12:49), Max: 36.9 (29 Nov 2022 21:15)  T(F): 98.4 (30 Nov 2022 12:49), Max: 98.4 (29 Nov 2022 21:15)  HR: 102 (30 Nov 2022 12:49) (73 - 102)  BP: 159/103 (30 Nov 2022 12:49) (143/101 - 159/103)  BP(mean): --  RR: 20 (30 Nov 2022 12:49) (18 - 20)  SpO2: 98% (30 Nov 2022 12:49) (94% - 98%)    Parameters below as of 30 Nov 2022 12:49  Patient On (Oxygen Delivery Method): room air      CAPILLARY BLOOD GLUCOSE      POCT Blood Glucose.: 147 mg/dL (30 Nov 2022 11:31)  POCT Blood Glucose.: 166 mg/dL (30 Nov 2022 06:41)  POCT Blood Glucose.: 169 mg/dL (30 Nov 2022 05:33)  POCT Blood Glucose.: 128 mg/dL (29 Nov 2022 22:01)    I&O's Summary      PHYSICAL EXAM:  GENERAL: NAD, well-groomed  HEAD:  Atraumatic, Normocephalic ; EEG in progress  EYES: EOMI, L gaze preference, conjunctiva and sclera clear  NECK: Supple, No JVD  CHEST/LUNG: Clear to auscultation bilaterally; No wheeze  HEART: Regular rate and rhythm; No murmurs, rubs, or gallops  ABDOMEN: Soft, Nontender, Nondistended; Bowel sounds present  EXTREMITIES:  2+ Peripheral Pulses, No clubbing, cyanosis, or edema  PSYCH: flat affect  NEUROLOGY: AO x1, EOV, FC, EOMI, L gaze preference, minimal verbal outut, L droop, BUE spontaneous,  hand (L>R), BLE minimal spontaneous movement.  SKIN: No rashes or lesions    LABS:                        11.5   9.12  )-----------( 122      ( 30 Nov 2022 08:18 )             36.0     11-30    139  |  105  |  5<L>  ----------------------------<  158<H>  4.0   |  24  |  0.72    Ca    8.7      30 Nov 2022 08:18  Phos  2.2     11-29  Mg     2.0     11-29    TPro  5.9<L>  /  Alb  3.1<L>  /  TBili  0.5  /  DBili  x   /  AST  15  /  ALT  33  /  AlkPhos  97  11-30              RADIOLOGY & ADDITIONAL TESTS:    Imaging Personally Reviewed:  CT HEAD NON CON 11/29  Decreasing size of left temporal convexity and left para falcine chronic   subdural hematomas.    MRI BRAIN 11/25  1. Left lateral convexity subacute to long-standing subdural hematoma,   unchanged from 11/22/2022, increased from 10/20/2022    2. Left posterior parafalcine subacute to long-standing subdural   hematoma, unchanged since 10/20/2022    3. Left posterior convexity subarachnoid space hemorrhage (favored over   artifact)    4. Right frontal and right temporal tip focal encephalomalacia consistent   with posttraumatic etiology    5. Severe cerebral hemispheric white matter abnormality likely reflects   both ischemia and posttraumatic injury    6. Diffuse brain volume loss greater than typical for age consistent with   Central cerebral hemispheric white matter volume loss        Consultant(s) Notes Reviewed:  NSGY    Care Discussed with Consultants/Other Providers: NSGY

## 2022-11-30 NOTE — PROGRESS NOTE ADULT - NS PANP COMMENT GEN_ALL_CORE FT
32 minutes pent. more than 50 percent of time was spent on examining patient. reviewed labs images and spoke plan with patient's spouse and case management.

## 2022-11-30 NOTE — PROGRESS NOTE ADULT - PROBLEM SELECTOR PLAN 3
- Eliquis held since [facility]  - c/w Ruby - Eliquis held since SDH  - c/w Dilitazem CD 420mg po daily - Eliquis held since SDH  - c/w Dilitazem CD 120mg po daily

## 2022-11-30 NOTE — PROGRESS NOTE ADULT - ASSESSMENT
72M with a past medical history of  CAD, HTN, HLD, Afib (Eliquis held since SDH), R SDH evac in 9/2021 and more resent SDH s/p mechanical fall on 10/19 (s/p andexanet), initially presented to [facility]  for increased lethargy. Pt was treated for UTI, PNA. Admitted to NSGY service to rule out enlarging subdural hematoma ; given stable head CTs  there are no plans for surgical intervention per NSGY ; pt is for transfer to the Medicine service.

## 2022-11-30 NOTE — PROGRESS NOTE ADULT - PROBLEM SELECTOR PLAN 1
- presented with increased lethargy, minimal verbal output  - h/o SDH x 2, repeat head CT's have been stable and show a low concern for subdural empyema ; no surgical intervention per NSGY  - noted to have UTI, PNA at OSH ; s/p 10 day abx course  - EEG in progress  - seen by SLP for concern for aspiration ; planned for MBS  - c/w soft and bite sized + mildly thick liquids per SLP reccs  - aspiration precautions

## 2022-11-30 NOTE — PROGRESS NOTE ADULT - PROBLEM SELECTOR PLAN 8
DVT ppx : Lovenox 40mg SC q 24h    PT reccs Home with Home PT for strengthening, bed mob, transfer, gait and balance training; Home PT

## 2022-11-30 NOTE — PROGRESS NOTE ADULT - NSPROGADDITIONALINFOA_GEN_ALL_CORE
d/w DARRELL Pinon ; pt is to be transferred to Medicine Service. d/w NSGY ACP Zi ; pt is to be transferred to Medicine Service.    Pt's wife updated on plan of care at bedside d/w NSGY ACP Zi ; pt is to be transferred to Medicine Service.    Case d/w Medicine ACP Adi    Pt's wife updated on plan of care at bedside

## 2022-11-30 NOTE — PROGRESS NOTE ADULT - PROBLEM SELECTOR PLAN 2
- R SDH evac in 9/2021 and more resent SDH s/p mechanical fall on 10/19 (s/p andexanet),  - presented w/increased lethargy  - admitted to NSGY service to rule out enlarging subdural hematoma. Repeat head CT's have been stable and show a low concern for subdural empyema. MRI does not show convincing diffusion restriction  - No plans for surgical intervention per NSGY   - EEG in progress - R SDH evac in 9/2021 and more resent SDH s/p mechanical fall on 10/19 (s/p andexanet),  - presented w/increased lethargy  - admitted to NSGY service to rule out enlarging subdural hematoma. Repeat head CT's have been stable and show a low concern for subdural empyema. MRI does not show convincing diffusion restriction  - No plans for surgical intervention per NSGY   - EEG in progress  - for transfer to Medicine service

## 2022-11-30 NOTE — SWALLOW BEDSIDE ASSESSMENT ADULT - SLP PERTINENT HISTORY OF CURRENT PROBLEM
72M hx BPH, CAD, HTN/HLD, Afib (eliquis held since SDH), R SDH evac in 9/2021 and more resent SDH s/p mechanical fall on 10/19 (s/p andexanet), initially presented to [facility] for increased lethargy, now txfrd to [facility]. At OSH he had fever /leukocytosis, urosepsis, PNA; abx completed today. CT's have been stable and low concern for subdural empyema given MRI does not show convincing diffusion restriction (shine through). 72M hx BPH, CAD, HTN/HLD, Afib (eliquis held since SDH), R SDH evac in 9/2021 and more recent SDH s/p mechanical fall on 10/19 (s/p andexanet), initially presented to \[facility]\"" for increased lethargy, now txfrd to [facility]. At OSH he had fever/leukocytosis, urosepsis, and PNA; abx completed. CT's have been stable and low concern for subdural empyema given MRI does not show convincing diffusion restriction.

## 2022-11-30 NOTE — PHYSICAL THERAPY INITIAL EVALUATION ADULT - NSPTDISCHREC_GEN_A_CORE
patient dependent at baseline, and own all necessary DME as per note./No skilled PT needs Home with Home PT for strengthening, bed mob, transfer, gait and balance training/Home PT/No skilled PT needs

## 2022-11-30 NOTE — SWALLOW BEDSIDE ASSESSMENT ADULT - CONSISTENCIES ADMINISTERED
pureed/minced & moist/soft & bite-sized thin liquid easy to chew/regular solid moderately thick/mildly thick

## 2022-11-30 NOTE — SWALLOW BEDSIDE ASSESSMENT ADULT - SWALLOW EVAL: DIAGNOSIS
72M hx R SDH evac in 9/2021 and more recent SDH s/p mechanical fall on 10/19, initially presented to [facility] for increased lethargy, now txfrd to [facility]. At [facility] he had fever/leukocytosis, urosepsis, and PNA; abx completed. Pt presents w/ an oral and suspected pharyngeal dysphagia characterized by prolonged mastication time for solids, suspected delayed onset of pharyngeal swallow, reduced hyolaryngeal excursion on palpation, and wet vocal quality w/ thin liquids. No overt s/s of laryngeal penetration/aspiration noted w/ mildly thick liquids. Per d/w wife, swallow study recently attempted on (11/28 at OSH), however, Pt did not participate likely 2/2 completion of meal prior to study. A MBS is recommended to objectively assess the swallow mechanism and r/o aspiration given wet vocal quality w/ thin liquids.

## 2022-11-30 NOTE — PHYSICAL THERAPY INITIAL EVALUATION ADULT - PERTINENT HX OF CURRENT PROBLEM, REHAB EVAL
72M hx BPH, CAD, HTN/HLD, Afib (eliquis held since SDH), R SDH evac in 9/2021 and more resent SDH s/p mechanical fall on 10/19 (s/p andexanet), initially presented to [facility] for increased lethargy, now txfrd to [facility]. At OSH he had fever /leukocytosis, urosepsis, PNA; abx completed today. CT's have been stable and low concern for subdural empyema given MRI does not show convincing diffusion restriction (shine through).

## 2022-11-30 NOTE — PROGRESS NOTE ADULT - PROBLEM SELECTOR PLAN 6
- c/w Gabapentin  - c/w Ritalin - restart Mirtazapine 7.5mg po qbedtime  - c/w Ritalin 20mg po qd at 8 AM

## 2022-11-30 NOTE — CHART NOTE - NSCHARTNOTEFT_GEN_A_CORE
CAPRINI SCORE [CLOT] Score on Admission for     AGE RELATED RISK FACTORS                                                       MOBILITY RELATED FACTORS  [ ] Age 41-60 years                                            (1 Point)                  [ ] Bed rest                                                        (1 Point)  [ x] Age: 61-74 years                                           (2 Points)                 [ ] Plaster cast                                                   (2 Points)  [ ] Age= 75 years                                              (3 Points)                 [ ] Bed bound for more than 72 hours                 (2 Points)    DISEASE RELATED RISK FACTORS                                               GENDER SPECIFIC FACTORS  [ ] Edema in the lower extremities                       (1 Point)                  [ ] Pregnancy                                                     (1 Point)  [ ] Varicose veins                                               (1 Point)                  [ ] Post-partum < 6 weeks                                   (1 Point)             [ x] BMI > 25 Kg/m2                                            (1 Point)                  [ ] Hormonal therapy  or oral contraception          (1 Point)                 [ ] Sepsis (in the previous month)                        (1 Point)                  [ ] History of pregnancy complications                 (1 point)  [ ] Pneumonia or serious lung disease                                               [ ] Unexplained or recurrent                     (1 Point)           (in the previous month)                               (1 Point)  [ ] Abnormal pulmonary function test                     (1 Point)                 SURGERY RELATED RISK FACTORS (include planned surgeries)  [ ] Acute myocardial infarction                              (1 Point)                 [ ]  Section                                             (1 Point)  [ ] Congestive heart failure (in the previous month)  (1 Point)         [ ] Minor surgery                                                  (1 Point)   [ ] Inflammatory bowel disease                             (1 Point)                 [ ] Arthroscopic surgery                                        (2 Points)  [ ] Central venous access                                      (2 Points)                [ ] General surgery lasting more than 45 minutes   (2 Points)       [ ] Stroke (in the previous month)                          (5 Points)               [ ] Elective arthroplasty                                         (5 Points)            [ ] current or past malignancy                              (2 Points)                                                                                                       HEMATOLOGY RELATED FACTORS                                                 TRAUMA RELATED RISK FACTORS  [ ] Prior episodes of VTE                                     (3 Points)                [ ] Fracture of the hip, pelvis, or leg                       (5 Points)  [ ] Positive family history for VTE                         (3 Points)                 [ ] Acute spinal cord injury (in the previous month)  (5 Points)  [ ] Prothrombin 99455 A                                     (3 Points)                 [ ] Paralysis  (less than 1 month)                             (5 Points)  [ ] Factor V Leiden                                             (3 Points)                  [ ] Multiple Trauma within 1 month                        (5 Points)  [ ] Lupus anticoagulants                                     (3 Points)                                                           [ ] Anticardiolipin antibodies                               (3 Points)                                                       [ ] High homocysteine in the blood                      (3 Points)                                             [ ] Other congenital or acquired thrombophilia      (3 Points)                                                [ ] Heparin induced thrombocytopenia                  (3 Points)                                          Total Score [  3        ]    Risk:  Very low 0   Low 1 to 2   Moderate 3 to 4   High =5       VTE Prophylasix Recommednations:  [x ] mechanical pneumatic compression devices                                      [ ] contraindicated: _____________________  [x ] chemo prophylasix                                                                                   [ ] contraindicated _____________________    **** HIGH LIKELIHOOD DVT PRESENT ON ADMISSION  [ x] (please order LE dopplers within 24 hours of admission)

## 2022-12-01 DIAGNOSIS — E78.5 HYPERLIPIDEMIA, UNSPECIFIED: ICD-10-CM

## 2022-12-01 DIAGNOSIS — I10 ESSENTIAL (PRIMARY) HYPERTENSION: ICD-10-CM

## 2022-12-01 DIAGNOSIS — G62.9 POLYNEUROPATHY, UNSPECIFIED: ICD-10-CM

## 2022-12-01 DIAGNOSIS — I82.409 ACUTE EMBOLISM AND THROMBOSIS OF UNSPECIFIED DEEP VEINS OF UNSPECIFIED LOWER EXTREMITY: ICD-10-CM

## 2022-12-01 DIAGNOSIS — E11.65 TYPE 2 DIABETES MELLITUS WITH HYPERGLYCEMIA: ICD-10-CM

## 2022-12-01 LAB
ANION GAP SERPL CALC-SCNC: 13 MMOL/L — SIGNIFICANT CHANGE UP (ref 5–17)
BUN SERPL-MCNC: 6 MG/DL — LOW (ref 7–23)
CALCIUM SERPL-MCNC: 8.8 MG/DL — SIGNIFICANT CHANGE UP (ref 8.4–10.5)
CHLORIDE SERPL-SCNC: 107 MMOL/L — SIGNIFICANT CHANGE UP (ref 96–108)
CO2 SERPL-SCNC: 22 MMOL/L — SIGNIFICANT CHANGE UP (ref 22–31)
CREAT SERPL-MCNC: 0.7 MG/DL — SIGNIFICANT CHANGE UP (ref 0.5–1.3)
EGFR: 99 ML/MIN/1.73M2 — SIGNIFICANT CHANGE UP
GLUCOSE BLDC GLUCOMTR-MCNC: 155 MG/DL — HIGH (ref 70–99)
GLUCOSE BLDC GLUCOMTR-MCNC: 157 MG/DL — HIGH (ref 70–99)
GLUCOSE BLDC GLUCOMTR-MCNC: 158 MG/DL — HIGH (ref 70–99)
GLUCOSE BLDC GLUCOMTR-MCNC: 98 MG/DL — SIGNIFICANT CHANGE UP (ref 70–99)
GLUCOSE SERPL-MCNC: 184 MG/DL — HIGH (ref 70–99)
HCT VFR BLD CALC: 39.3 % — SIGNIFICANT CHANGE UP (ref 39–50)
HGB BLD-MCNC: 12.6 G/DL — LOW (ref 13–17)
MCHC RBC-ENTMCNC: 25.3 PG — LOW (ref 27–34)
MCHC RBC-ENTMCNC: 32.1 GM/DL — SIGNIFICANT CHANGE UP (ref 32–36)
MCV RBC AUTO: 78.9 FL — LOW (ref 80–100)
NRBC # BLD: 0 /100 WBCS — SIGNIFICANT CHANGE UP (ref 0–0)
PLATELET # BLD AUTO: 249 K/UL — SIGNIFICANT CHANGE UP (ref 150–400)
POTASSIUM SERPL-MCNC: 3.9 MMOL/L — SIGNIFICANT CHANGE UP (ref 3.5–5.3)
POTASSIUM SERPL-SCNC: 3.9 MMOL/L — SIGNIFICANT CHANGE UP (ref 3.5–5.3)
RBC # BLD: 4.98 M/UL — SIGNIFICANT CHANGE UP (ref 4.2–5.8)
RBC # FLD: 17.1 % — HIGH (ref 10.3–14.5)
SODIUM SERPL-SCNC: 142 MMOL/L — SIGNIFICANT CHANGE UP (ref 135–145)
WBC # BLD: 10.89 K/UL — HIGH (ref 3.8–10.5)
WBC # FLD AUTO: 10.89 K/UL — HIGH (ref 3.8–10.5)

## 2022-12-01 PROCEDURE — 99222 1ST HOSP IP/OBS MODERATE 55: CPT

## 2022-12-01 PROCEDURE — 93970 EXTREMITY STUDY: CPT | Mod: 26

## 2022-12-01 PROCEDURE — 99233 SBSQ HOSP IP/OBS HIGH 50: CPT

## 2022-12-01 PROCEDURE — 74177 CT ABD & PELVIS W/CONTRAST: CPT | Mod: 26

## 2022-12-01 RX ORDER — LOSARTAN POTASSIUM 100 MG/1
50 TABLET, FILM COATED ORAL EVERY 12 HOURS
Refills: 0 | Status: DISCONTINUED | OUTPATIENT
Start: 2022-12-01 | End: 2022-12-07

## 2022-12-01 RX ORDER — HYDRALAZINE HCL 50 MG
50 TABLET ORAL EVERY 8 HOURS
Refills: 0 | Status: DISCONTINUED | OUTPATIENT
Start: 2022-12-01 | End: 2022-12-07

## 2022-12-01 RX ADMIN — Medication 650 MILLIGRAM(S): at 05:32

## 2022-12-01 RX ADMIN — LOSARTAN POTASSIUM 50 MILLIGRAM(S): 100 TABLET, FILM COATED ORAL at 17:20

## 2022-12-01 RX ADMIN — Medication 20 MILLIGRAM(S): at 05:31

## 2022-12-01 RX ADMIN — Medication 2: at 11:58

## 2022-12-01 RX ADMIN — LATANOPROST 1 DROP(S): 0.05 SOLUTION/ DROPS OPHTHALMIC; TOPICAL at 22:36

## 2022-12-01 RX ADMIN — LOSARTAN POTASSIUM 50 MILLIGRAM(S): 100 TABLET, FILM COATED ORAL at 05:31

## 2022-12-01 RX ADMIN — GABAPENTIN 400 MILLIGRAM(S): 400 CAPSULE ORAL at 05:31

## 2022-12-01 RX ADMIN — Medication 2: at 07:33

## 2022-12-01 RX ADMIN — Medication 1000 UNIT(S): at 12:00

## 2022-12-01 RX ADMIN — Medication 50 MILLIGRAM(S): at 12:00

## 2022-12-01 RX ADMIN — Medication 120 MILLIGRAM(S): at 05:31

## 2022-12-01 RX ADMIN — ENOXAPARIN SODIUM 40 MILLIGRAM(S): 100 INJECTION SUBCUTANEOUS at 17:20

## 2022-12-01 RX ADMIN — GABAPENTIN 400 MILLIGRAM(S): 400 CAPSULE ORAL at 12:00

## 2022-12-01 NOTE — PROGRESS NOTE ADULT - PROBLEM SELECTOR PLAN 1
- presented with increased lethargy, minimal verbal output  - h/o SDH x 2, repeat head CT's have been stable and show a low concern for subdural empyema ; no surgical intervention per NSGY  - noted to have UTI, PNA at OSH ; s/p 10 day abx course  - EEG - Infrequent right frontal sharp waves, though in context of breach effect, Risk of seizures from the right frontal region.  - Neurology consulted to assist with above findings  - seen by SLP for concern for aspiration ; planned for MBS  - c/w soft and bite sized + mildly thick liquids per SLP reccs  - aspiration precautions - presented with increased lethargy, minimal verbal output  - h/o SDH x 2, repeat head CT's have been stable and show a low concern for subdural empyema ; no surgical intervention per NSGY  - noted to have UTI, PNA at OSH ; s/p 10 day abx course  - EEG - Infrequent right frontal sharp waves, though in context of breach effect, Risk of seizures from the right frontal region.  - Neurology consulted to assist with above findings - no indication for AEDs at this time. Outpatient follow up with epilepsy neurologist recommended.   - seen by SLP for concern for aspiration ; MBS rec NPO. Discussed with spouse in detail all risks associated with these findings. Not interested in feeding tubes, wants to continue feeds by mouth and close ENT follow up outpatient.   - c/w soft and bite sized + mildly thick liquids  - aspiration precautions

## 2022-12-01 NOTE — CONSULT NOTE ADULT - ASSESSMENT
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

## 2022-12-01 NOTE — EEG REPORT - NS EEG TEXT BOX
MELBA PARMAR [id number]     Study Date: 11-30-22 1032AM to  12-01-22 0800AM   Duration: 21:01 hrs   --------------------------------------------------------------------------------------------------  History:  CC/ HPI Patient is a 72y old  Male who presents with a chief complaint of Infectious workup (30 Nov 2022 19:23)    MEDICATIONS  (STANDING):  atorvastatin 40 milliGRAM(s) Oral at bedtime  diltiazem    milliGRAM(s) Oral daily  enoxaparin Injectable 40 milliGRAM(s) SubCutaneous <User Schedule>  gabapentin 400 milliGRAM(s) Oral three times a day  glucagon  Injectable 1 milliGRAM(s) IntraMuscular once  hydrALAZINE 50 milliGRAM(s) Oral every 8 hours  insulin lispro (ADMELOG) corrective regimen sliding scale   SubCutaneous three times a day before meals  latanoprost 0.005% Ophthalmic Solution 1 Drop(s) Both EYES at bedtime  losartan 50 milliGRAM(s) Oral every 12 hours  methylphenidate 20 milliGRAM(s) Oral <User Schedule>  mirtazapine 7.5 milliGRAM(s) Oral at bedtime  sodium chloride 0.9%. 1000 milliLiter(s) (60 mL/Hr) IV Continuous <Continuous>    --------------------------------------------------------------------------------------------------  Study Interpretation:    [Abbreviation Key:  PDR=alpha rhythm/posterior dominant rhythm. A-P=anterior posterior.  Amplitude: ‘very low’:<20; ‘low’:20-49; ‘medium’:; ‘high’:>150uV.  Persistence for periodic/rhythmic patterns (% of epoch) ‘rare’:<1%; ‘occasional’:1-10%; ‘frequent’:10-50%; ‘abundant’:50-90%; ‘continuous’:>90%.  Persistence for sporadic discharges: ‘rare’:<1/hr; ‘occasional’:1/min-1/hr; ‘frequent’:>1/min; ‘abundant’:>1/10 sec.  RPP=rhythmic and periodic patterns; GRDA=generalized rhythmic delta activity; FIRDA=frontal intermittent GRDA; LRDA=lateralized rhythmic delta activity; TIRDA=temporal intermittent rhythmic delta activity;  LPD=PLED=lateralized periodic discharges; GPD=generalized periodic discharges; BIPDs =bilateral independent periodic discharges; Mf=multifocal; SIRPDs=stimulus induced rhythmic, periodic, or ictal appearing discharges; BIRDs=brief potentially ictal rhythmic discharges >4 Hz, lasting .5-10s; PFA (paroxysmal bursts >13 Hz or =8 Hz <10s).  Modifiers: +F=with fast component; +S=with spike component; +R=with rhythmic component.  S-B=burst suppression pattern.  Max=maximal. N1-drowsy; N2-stage II sleep; N3-slow wave sleep. SSS/BETS=small sharp spikes/benign epileptiform transients of sleep. HV=hyperventilation; PS=photic stimulation]    Daily EEG Visual Analysis  FINDINGS:      Background:  Continuity: continuous  Symmetry: asymmetric  PDR: 7 Hz activity, with amplitude to 40 uV, that attenuated to eye opening.    Reactivity: present  Voltage: normal (between 20-150uV)  Anterior Posterior Gradient: present  Other background findings: none  Breach: accentuation present over the right frontocentral region     Background Slowing:  Generalized slowing: diffuse irregular delta and theta activity.  Focal slowing: continuous polymorphic delta/theta activity over the right frontocentral region     State Changes:   -Drowsiness noted with increased slowing, attenuation of fast activity, vertex transients.  -Present with N2 sleep transients with spindles and K-complexes.    Sporadic Epileptiform Discharges:    None    Rhythmic and Periodic Patterns (RPPs):  None     Electrographic and Electroclinical seizures:  None    Other Clinical Events:  None    Activation Procedures:   -Hyperventilation was not performed.    -Photic stimulation was not performed.     Artifacts:  Intermittent myogenic and movement artifacts were noted.    ECG:  The heart rate on single channel ECG was predominantly between  BPM. Irregular     EEG Classification / Summary:  Abnormal EEG study  Mild generalized background slowing  Continuous focal slowing, right frontocentral   Breach rhythm over the right frontocentral region   -----------------------------------------------------------------------------------------------------    Clinical Impression:  There is evidence of right frontocentral structural abnormality  Mild diffuse/multifocal cerebral dysfunction, not specific as to etiology.  There were no epileptiform abnormalities recorded.    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     -------------------------------------------------------------------------------------------------------  Eduardo Marquez MD  Epilepsy Fellow , [facility]  Department of Neurology, [facility]    [facility] EEG Reading Room Ph#: [phone]  Epilepsy Answering Service after 5PM and before 8:30AM: Ph#: [phone]     EMLBA PARMAR [id number]     Study Date: 11-30-22 1032AM to  12-01-22 0800AM   Duration: 21:01 hrs   --------------------------------------------------------------------------------------------------  History:  CC/ HPI Patient is a 72y old  Male who presents with a chief complaint of Infectious workup (30 Nov 2022 19:23)    MEDICATIONS  (STANDING):  atorvastatin 40 milliGRAM(s) Oral at bedtime  diltiazem    milliGRAM(s) Oral daily  enoxaparin Injectable 40 milliGRAM(s) SubCutaneous <User Schedule>  gabapentin 400 milliGRAM(s) Oral three times a day  glucagon  Injectable 1 milliGRAM(s) IntraMuscular once  hydrALAZINE 50 milliGRAM(s) Oral every 8 hours  insulin lispro (ADMELOG) corrective regimen sliding scale   SubCutaneous three times a day before meals  latanoprost 0.005% Ophthalmic Solution 1 Drop(s) Both EYES at bedtime  losartan 50 milliGRAM(s) Oral every 12 hours  methylphenidate 20 milliGRAM(s) Oral <User Schedule>  mirtazapine 7.5 milliGRAM(s) Oral at bedtime  sodium chloride 0.9%. 1000 milliLiter(s) (60 mL/Hr) IV Continuous <Continuous>    --------------------------------------------------------------------------------------------------  Study Interpretation:    [Abbreviation Key:  PDR=alpha rhythm/posterior dominant rhythm. A-P=anterior posterior.  Amplitude: ‘very low’:<20; ‘low’:20-49; ‘medium’:; ‘high’:>150uV.  Persistence for periodic/rhythmic patterns (% of epoch) ‘rare’:<1%; ‘occasional’:1-10%; ‘frequent’:10-50%; ‘abundant’:50-90%; ‘continuous’:>90%.  Persistence for sporadic discharges: ‘rare’:<1/hr; ‘occasional’:1/min-1/hr; ‘frequent’:>1/min; ‘abundant’:>1/10 sec.  RPP=rhythmic and periodic patterns; GRDA=generalized rhythmic delta activity; FIRDA=frontal intermittent GRDA; LRDA=lateralized rhythmic delta activity; TIRDA=temporal intermittent rhythmic delta activity;  LPD=PLED=lateralized periodic discharges; GPD=generalized periodic discharges; BIPDs =bilateral independent periodic discharges; Mf=multifocal; SIRPDs=stimulus induced rhythmic, periodic, or ictal appearing discharges; BIRDs=brief potentially ictal rhythmic discharges >4 Hz, lasting .5-10s; PFA (paroxysmal bursts >13 Hz or =8 Hz <10s).  Modifiers: +F=with fast component; +S=with spike component; +R=with rhythmic component.  S-B=burst suppression pattern.  Max=maximal. N1-drowsy; N2-stage II sleep; N3-slow wave sleep. SSS/BETS=small sharp spikes/benign epileptiform transients of sleep. HV=hyperventilation; PS=photic stimulation]    Daily EEG Visual Analysis  FINDINGS:      Background:  Continuity: continuous  Symmetry: asymmetric  PDR: 7 Hz activity, with amplitude to 40 uV, that attenuated to eye opening.    Reactivity: present  Voltage: normal (between 20-150uV)  Anterior Posterior Gradient: present  Other background findings: none  Breach: accentuation present over the right frontocentral region     Background Slowing:  Generalized slowing: diffuse irregular delta and theta activity.  Focal slowing: continuous polymorphic delta/theta activity over the right frontocentral region     State Changes:   -Drowsiness noted with increased slowing, attenuation of fast activity, vertex transients.  -Present with N2 sleep transients with spindles and K-complexes.    Sporadic Epileptiform Discharges:    Infrequent right frontal sharp waves max F4, though in context of breach effect    Rhythmic and Periodic Patterns (RPPs):  None     Electrographic and Electroclinical seizures:  None    Other Clinical Events:  None    Activation Procedures:   -Hyperventilation was not performed.    -Photic stimulation was not performed.     Artifacts:  Intermittent myogenic and movement artifacts were noted.    ECG:  The heart rate on single channel ECG was predominantly between  BPM. Irregular     EEG Classification / Summary:  Abnormal EEG study  Infrequent right frontal sharp waves, though in context of breach effect  Continuous focal slowing, right frontocentral   Mild generalized background slowing  Breach rhythm over the right frontocentral region   -----------------------------------------------------------------------------------------------------    Clinical Impression:  Risk of seizures from the right frontal region.  There is evidence of right frontocentral structural abnormality  Mild diffuse/multifocal cerebral dysfunction, not specific as to etiology.  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     -------------------------------------------------------------------------------------------------------  Eduardo Marquez MD  Epilepsy Fellow , [facility]  Department of Neurology, [facility]    [facility] EEG Reading Room Ph#: [phone]  Epilepsy Answering Service after 5PM and before 8:30AM: Ph#: [phone]     MELBA PARMAR [id number]     Study Date: 11-30-22 1032AM to  12-01-22 1220PM   Duration: 25:21 hrs   --------------------------------------------------------------------------------------------------  History:  CC/ HPI Patient is a 72y old  Male who presents with a chief complaint of Infectious workup (30 Nov 2022 19:23)    MEDICATIONS  (STANDING):  atorvastatin 40 milliGRAM(s) Oral at bedtime  diltiazem    milliGRAM(s) Oral daily  enoxaparin Injectable 40 milliGRAM(s) SubCutaneous <User Schedule>  gabapentin 400 milliGRAM(s) Oral three times a day  glucagon  Injectable 1 milliGRAM(s) IntraMuscular once  hydrALAZINE 50 milliGRAM(s) Oral every 8 hours  insulin lispro (ADMELOG) corrective regimen sliding scale   SubCutaneous three times a day before meals  latanoprost 0.005% Ophthalmic Solution 1 Drop(s) Both EYES at bedtime  losartan 50 milliGRAM(s) Oral every 12 hours  methylphenidate 20 milliGRAM(s) Oral <User Schedule>  mirtazapine 7.5 milliGRAM(s) Oral at bedtime  sodium chloride 0.9%. 1000 milliLiter(s) (60 mL/Hr) IV Continuous <Continuous>    --------------------------------------------------------------------------------------------------  Study Interpretation:    [Abbreviation Key:  PDR=alpha rhythm/posterior dominant rhythm. A-P=anterior posterior.  Amplitude: ‘very low’:<20; ‘low’:20-49; ‘medium’:; ‘high’:>150uV.  Persistence for periodic/rhythmic patterns (% of epoch) ‘rare’:<1%; ‘occasional’:1-10%; ‘frequent’:10-50%; ‘abundant’:50-90%; ‘continuous’:>90%.  Persistence for sporadic discharges: ‘rare’:<1/hr; ‘occasional’:1/min-1/hr; ‘frequent’:>1/min; ‘abundant’:>1/10 sec.  RPP=rhythmic and periodic patterns; GRDA=generalized rhythmic delta activity; FIRDA=frontal intermittent GRDA; LRDA=lateralized rhythmic delta activity; TIRDA=temporal intermittent rhythmic delta activity;  LPD=PLED=lateralized periodic discharges; GPD=generalized periodic discharges; BIPDs =bilateral independent periodic discharges; Mf=multifocal; SIRPDs=stimulus induced rhythmic, periodic, or ictal appearing discharges; BIRDs=brief potentially ictal rhythmic discharges >4 Hz, lasting .5-10s; PFA (paroxysmal bursts >13 Hz or =8 Hz <10s).  Modifiers: +F=with fast component; +S=with spike component; +R=with rhythmic component.  S-B=burst suppression pattern.  Max=maximal. N1-drowsy; N2-stage II sleep; N3-slow wave sleep. SSS/BETS=small sharp spikes/benign epileptiform transients of sleep. HV=hyperventilation; PS=photic stimulation]    Daily EEG Visual Analysis  FINDINGS:      Background:  Continuity: continuous  Symmetry: asymmetric  PDR: 7 Hz activity, with amplitude to 40 uV, that attenuated to eye opening.    Reactivity: present  Voltage: normal (between 20-150uV)  Anterior Posterior Gradient: present  Other background findings: none  Breach: accentuation present over the right frontocentral region     Background Slowing:  Generalized slowing: diffuse irregular delta and theta activity.  Focal slowing: continuous polymorphic delta/theta activity over the right frontocentral region     State Changes:   -Drowsiness noted with increased slowing, attenuation of fast activity, vertex transients.  -Present with N2 sleep transients with spindles and K-complexes.    Sporadic Epileptiform Discharges:    Infrequent right frontal sharp waves max F4, though in context of breach effect    Rhythmic and Periodic Patterns (RPPs):  None     Electrographic and Electroclinical seizures:  None    Other Clinical Events:  None    Activation Procedures:   -Hyperventilation was not performed.    -Photic stimulation was not performed.     Artifacts:  Intermittent myogenic and movement artifacts were noted.    ECG:  The heart rate on single channel ECG was predominantly between  BPM. Irregular     EEG Classification / Summary:  Abnormal EEG study  Infrequent right frontal sharp waves, though in context of breach effect  Continuous focal slowing, right frontocentral   Mild generalized background slowing  Breach rhythm over the right frontocentral region   -----------------------------------------------------------------------------------------------------    Clinical Impression:  Risk of seizures from the right frontal region.  There is evidence of right frontocentral structural abnormality  Mild diffuse/multifocal cerebral dysfunction, not specific as to etiology.  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     -------------------------------------------------------------------------------------------------------  Eduardo Marquez MD  Epilepsy Fellow , [facility]  Department of Neurology, [facility]    [facility] EEG Reading Room Ph#: [phone]  Epilepsy Answering Service after 5PM and before 8:30AM: Ph#: [phone]     MELBA PARMAR [id number]     Study Date: 11-30-22 1032AM to  12-01-22 1220PM   Duration: 25:21 hrs   --------------------------------------------------------------------------------------------------  History:  CC/ HPI Patient is a 72y old  Male who presents with a chief complaint of Infectious workup (30 Nov 2022 19:23)    MEDICATIONS  (STANDING):  atorvastatin 40 milliGRAM(s) Oral at bedtime  diltiazem    milliGRAM(s) Oral daily  enoxaparin Injectable 40 milliGRAM(s) SubCutaneous <User Schedule>  gabapentin 400 milliGRAM(s) Oral three times a day  glucagon  Injectable 1 milliGRAM(s) IntraMuscular once  hydrALAZINE 50 milliGRAM(s) Oral every 8 hours  insulin lispro (ADMELOG) corrective regimen sliding scale   SubCutaneous three times a day before meals  latanoprost 0.005% Ophthalmic Solution 1 Drop(s) Both EYES at bedtime  losartan 50 milliGRAM(s) Oral every 12 hours  methylphenidate 20 milliGRAM(s) Oral <User Schedule>  mirtazapine 7.5 milliGRAM(s) Oral at bedtime  sodium chloride 0.9%. 1000 milliLiter(s) (60 mL/Hr) IV Continuous <Continuous>    --------------------------------------------------------------------------------------------------  Study Interpretation:    [Abbreviation Key:  PDR=alpha rhythm/posterior dominant rhythm. A-P=anterior posterior.  Amplitude: ‘very low’:<20; ‘low’:20-49; ‘medium’:; ‘high’:>150uV.  Persistence for periodic/rhythmic patterns (% of epoch) ‘rare’:<1%; ‘occasional’:1-10%; ‘frequent’:10-50%; ‘abundant’:50-90%; ‘continuous’:>90%.  Persistence for sporadic discharges: ‘rare’:<1/hr; ‘occasional’:1/min-1/hr; ‘frequent’:>1/min; ‘abundant’:>1/10 sec.  RPP=rhythmic and periodic patterns; GRDA=generalized rhythmic delta activity; FIRDA=frontal intermittent GRDA; LRDA=lateralized rhythmic delta activity; TIRDA=temporal intermittent rhythmic delta activity;  LPD=PLED=lateralized periodic discharges; GPD=generalized periodic discharges; BIPDs =bilateral independent periodic discharges; Mf=multifocal; SIRPDs=stimulus induced rhythmic, periodic, or ictal appearing discharges; BIRDs=brief potentially ictal rhythmic discharges >4 Hz, lasting .5-10s; PFA (paroxysmal bursts >13 Hz or =8 Hz <10s).  Modifiers: +F=with fast component; +S=with spike component; +R=with rhythmic component.  S-B=burst suppression pattern.  Max=maximal. N1-drowsy; N2-stage II sleep; N3-slow wave sleep. SSS/BETS=small sharp spikes/benign epileptiform transients of sleep. HV=hyperventilation; PS=photic stimulation]    Daily EEG Visual Analysis  FINDINGS:      Background:  Continuity: continuous  Symmetry: asymmetric  PDR: 7 Hz activity, with amplitude to 40 uV, that attenuated to eye opening.    Reactivity: present  Voltage: normal (between 20-150uV)  Anterior Posterior Gradient: present  Other background findings: none  Breach: accentuation present over the right frontocentral region     Background Slowing:  Generalized slowing: diffuse irregular delta and theta activity.  Focal slowing: continuous polymorphic delta/theta activity over the right frontocentral region     State Changes:   -Drowsiness noted with increased slowing, attenuation of fast activity, vertex transients.  -Present with N2 sleep transients with spindles and K-complexes.    Sporadic Epileptiform Discharges:    Infrequent right frontal sharp waves max F4, though in context of breach effect    Rhythmic and Periodic Patterns (RPPs):  None     Electrographic and Electroclinical seizures:  None    Other Clinical Events:  None    Activation Procedures:   -Hyperventilation was not performed.    -Photic stimulation was not performed.     Artifacts:  Intermittent myogenic and movement artifacts were noted.    ECG:  The heart rate on single channel ECG was predominantly between  BPM. Irregular     EEG Classification / Summary:  Abnormal EEG study  Infrequent right frontal sharp waves, though in context of breach effect  Continuous focal slowing, right frontocentral   Mild generalized background slowing  Breach rhythm over the right frontocentral region   -----------------------------------------------------------------------------------------------------  Clinical Impression:  Risk of seizures from the right frontal region.  There is evidence of right frontocentral structural abnormality  Mild diffuse/multifocal cerebral dysfunction, not specific as to etiology.  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

## 2022-12-01 NOTE — CONSULT NOTE ADULT - PROBLEM SELECTOR RECOMMENDATION 9
Diabetes Education and Nutrition Eval  Low correction scale q6 while NPO, Once eating qac and qhs.  No need for standing insulin at this time.   Goal glucose 100-180  Outpt. endo follow-up  Outpt. optho, podiatry, micro/cr  Plan to d/c on metformin 1000mg BID

## 2022-12-01 NOTE — PROGRESS NOTE ADULT - PROBLEM SELECTOR PLAN 8
DVT ppx : Lovenox 40mg SC q 24h    PT reccs Home with Home PT for strengthening, bed mob, transfer, gait and balance training; Home PT  (likely dc home tomorrow pending neuro recs to comment on EEG findings, MBS and LE duplex) - CM aware, setting up home care - c/w Gabapentin 400mg po tid

## 2022-12-01 NOTE — PROGRESS NOTE ADULT - PROBLEM SELECTOR PLAN 7
- c/w Gabapentin 400mg po tid - restart Mirtazapine 7.5mg po qbedtime  - c/w Ritalin 20mg po qd at 8 AM

## 2022-12-01 NOTE — PROGRESS NOTE ADULT - SUBJECTIVE AND OBJECTIVE BOX
[facility] Division of Hospital Medicine  Prosper Newell MD, MORE  I'm reachable on [employer] Teams   Off hours:  [phone] ([facility] pager)    Patient is a 72y old  Male who presents with a chief complaint of Infectious workup (30 Nov 2022 19:23)      SUBJECTIVE / OVERNIGHT EVENTS:  No events overnight. This morning when I visited the patient was undergoing an EEG. Seemed sleepy, did not communicate with me. No distress. Per RN, his wife was here earlier and was able to feed him breakfast.     MEDICATIONS  (STANDING):  atorvastatin 40 milliGRAM(s) Oral at bedtime  cholecalciferol 1000 Unit(s) Oral daily  dextrose 5%. 1000 milliLiter(s) (50 mL/Hr) IV Continuous <Continuous>  dextrose 5%. 1000 milliLiter(s) (100 mL/Hr) IV Continuous <Continuous>  dextrose 50% Injectable 25 Gram(s) IV Push once  dextrose 50% Injectable 12.5 Gram(s) IV Push once  dextrose 50% Injectable 25 Gram(s) IV Push once  diltiazem    milliGRAM(s) Oral daily  enoxaparin Injectable 40 milliGRAM(s) SubCutaneous <User Schedule>  gabapentin 400 milliGRAM(s) Oral three times a day  glucagon  Injectable 1 milliGRAM(s) IntraMuscular once  hydrALAZINE 50 milliGRAM(s) Oral every 8 hours  insulin lispro (ADMELOG) corrective regimen sliding scale   SubCutaneous three times a day before meals  latanoprost 0.005% Ophthalmic Solution 1 Drop(s) Both EYES at bedtime  losartan 50 milliGRAM(s) Oral every 12 hours  methylphenidate 20 milliGRAM(s) Oral <User Schedule>  mirtazapine 7.5 milliGRAM(s) Oral at bedtime  sodium chloride 0.9%. 1000 milliLiter(s) (60 mL/Hr) IV Continuous <Continuous>    MEDICATIONS  (PRN):  acetaminophen     Tablet .. 650 milliGRAM(s) Oral every 6 hours PRN Mild Pain (1 - 3)  aluminum hydroxide/magnesium hydroxide/simethicone Suspension 30 milliLiter(s) Oral every 4 hours PRN Dyspepsia  dextrose Oral Gel 15 Gram(s) Oral once PRN Blood Glucose LESS THAN 70 milliGRAM(s)/deciliter  ondansetron Injectable 4 milliGRAM(s) IV Push once PRN Nausea    CAPILLARY BLOOD GLUCOSE  POCT Blood Glucose.: 158 mg/dL (01 Dec 2022 11:26)  POCT Blood Glucose.: 157 mg/dL (01 Dec 2022 07:23)  POCT Blood Glucose.: 127 mg/dL (30 Nov 2022 21:12)  POCT Blood Glucose.: 141 mg/dL (30 Nov 2022 16:42)    I&O's Summary    01 Dec 2022 07:01  -  01 Dec 2022 12:50  --------------------------------------------------------  IN: 280 mL / OUT: 0 mL / NET: 280 mL        PHYSICAL EXAM:  Vital Signs Last 24 Hrs  T(C): 36.5 (01 Dec 2022 10:00), Max: 37.2 (30 Nov 2022 22:30)  T(F): 97.7 (01 Dec 2022 10:00), Max: 98.9 (30 Nov 2022 22:30)  HR: 96 (01 Dec 2022 10:00) (96 - 120)  BP: 110/75 (01 Dec 2022 10:00) (110/75 - 165/101)  BP(mean): --  RR: 19 (01 Dec 2022 10:00) (18 - 19)  SpO2: 96% (01 Dec 2022 10:00) (96% - 97%)    Parameters below as of 01 Dec 2022 10:00  Patient On (Oxygen Delivery Method): room air    GENERAL: elderly man, NAD  HEAD:  Atraumatic, Normocephalic ; EEG in progress  EYES: EOMI, L gaze preference, conjunctiva and sclera clear  NECK: Supple, No JVD  CHEST/LUNG: Clear to auscultation bilaterally; No wheeze  HEART: Regular rate and rhythm; No murmurs, rubs, or gallops  ABDOMEN: Soft, Nontender, Nondistended; Bowel sounds present  EXTREMITIES:  No clubbing, cyanosis, or edema  PSYCH: arousable, but sleeping mostly - did not talk to me      LABS:                        12.6   10.89 )-----------( 249      ( 01 Dec 2022 10:06 )             39.3     12-01    142  |  107  |  6<L>  ----------------------------<  184<H>  3.9   |  22  |  0.70    Ca    8.8      01 Dec 2022 10:06    TPro  5.9<L>  /  Alb  3.1<L>  /  TBili  0.5  /  DBili  x   /  AST  15  /  ALT  33  /  AlkPhos  97  11-30      RADIOLOGY & ADDITIONAL TESTS:    Lab Results Reviewed: mild leukocytosis, Cr normal    Imaging Personally Reviewed:   EEG - Infrequent right frontal sharp waves, though in context of breach effect, Risk of seizures from the right frontal region.    COORDINATION OF CARE:  Care Discussed with Consultants/Other Providers:  Neurology re: EEG findings [facility] Division of Hospital Medicine  Prosper Newell MD, MORE  I'm reachable on [employer] Teams   Off hours:  [phone] ([facility] pager)    Patient is a 72y old  Male who presents with a chief complaint of Infectious workup (30 Nov 2022 19:23)      SUBJECTIVE / OVERNIGHT EVENTS:  No events overnight. This morning when I visited the patient was undergoing an EEG. Seemed sleepy, did not communicate with me. No distress. Per RN, his wife was here earlier and was able to feed him breakfast.     MEDICATIONS  (STANDING):  atorvastatin 40 milliGRAM(s) Oral at bedtime  cholecalciferol 1000 Unit(s) Oral daily  dextrose 5%. 1000 milliLiter(s) (50 mL/Hr) IV Continuous <Continuous>  dextrose 5%. 1000 milliLiter(s) (100 mL/Hr) IV Continuous <Continuous>  dextrose 50% Injectable 25 Gram(s) IV Push once  dextrose 50% Injectable 12.5 Gram(s) IV Push once  dextrose 50% Injectable 25 Gram(s) IV Push once  diltiazem    milliGRAM(s) Oral daily  enoxaparin Injectable 40 milliGRAM(s) SubCutaneous <User Schedule>  gabapentin 400 milliGRAM(s) Oral three times a day  glucagon  Injectable 1 milliGRAM(s) IntraMuscular once  hydrALAZINE 50 milliGRAM(s) Oral every 8 hours  insulin lispro (ADMELOG) corrective regimen sliding scale   SubCutaneous three times a day before meals  latanoprost 0.005% Ophthalmic Solution 1 Drop(s) Both EYES at bedtime  losartan 50 milliGRAM(s) Oral every 12 hours  methylphenidate 20 milliGRAM(s) Oral <User Schedule>  mirtazapine 7.5 milliGRAM(s) Oral at bedtime  sodium chloride 0.9%. 1000 milliLiter(s) (60 mL/Hr) IV Continuous <Continuous>    MEDICATIONS  (PRN):  acetaminophen     Tablet .. 650 milliGRAM(s) Oral every 6 hours PRN Mild Pain (1 - 3)  aluminum hydroxide/magnesium hydroxide/simethicone Suspension 30 milliLiter(s) Oral every 4 hours PRN Dyspepsia  dextrose Oral Gel 15 Gram(s) Oral once PRN Blood Glucose LESS THAN 70 milliGRAM(s)/deciliter  ondansetron Injectable 4 milliGRAM(s) IV Push once PRN Nausea    CAPILLARY BLOOD GLUCOSE  POCT Blood Glucose.: 158 mg/dL (01 Dec 2022 11:26)  POCT Blood Glucose.: 157 mg/dL (01 Dec 2022 07:23)  POCT Blood Glucose.: 127 mg/dL (30 Nov 2022 21:12)  POCT Blood Glucose.: 141 mg/dL (30 Nov 2022 16:42)    I&O's Summary    01 Dec 2022 07:01  -  01 Dec 2022 12:50  --------------------------------------------------------  IN: 280 mL / OUT: 0 mL / NET: 280 mL        PHYSICAL EXAM:  Vital Signs Last 24 Hrs  T(C): 36.5 (01 Dec 2022 10:00), Max: 37.2 (30 Nov 2022 22:30)  T(F): 97.7 (01 Dec 2022 10:00), Max: 98.9 (30 Nov 2022 22:30)  HR: 96 (01 Dec 2022 10:00) (96 - 120)  BP: 110/75 (01 Dec 2022 10:00) (110/75 - 165/101)  BP(mean): --  RR: 19 (01 Dec 2022 10:00) (18 - 19)  SpO2: 96% (01 Dec 2022 10:00) (96% - 97%)    Parameters below as of 01 Dec 2022 10:00  Patient On (Oxygen Delivery Method): room air    GENERAL: elderly man, NAD  HEAD:  Atraumatic, Normocephalic ; EEG in progress  EYES: EOMI, L gaze preference, conjunctiva and sclera clear  NECK: Supple, No JVD  CHEST/LUNG: Clear to auscultation bilaterally; No wheeze  HEART: Regular rate and rhythm; No murmurs, rubs, or gallops  ABDOMEN: Soft, Nontender, Nondistended; Bowel sounds present  EXTREMITIES:  No clubbing, cyanosis, or edema  PSYCH: arousable, but sleeping mostly - did not talk to me      LABS:                        12.6   10.89 )-----------( 249      ( 01 Dec 2022 10:06 )             39.3     12-01    142  |  107  |  6<L>  ----------------------------<  184<H>  3.9   |  22  |  0.70    Ca    8.8      01 Dec 2022 10:06    TPro  5.9<L>  /  Alb  3.1<L>  /  TBili  0.5  /  DBili  x   /  AST  15  /  ALT  33  /  AlkPhos  97  11-30      RADIOLOGY & ADDITIONAL TESTS:    Lab Results Reviewed: mild leukocytosis, Cr normal    Imaging Personally Reviewed:     EEG - Infrequent right frontal sharp waves, though in context of breach effect, Risk of seizures from the right frontal region.    VA Duplex: Acute above the knee DVT extending from the calf veins through the external iliac vein in the left lower extremity.    COORDINATION OF CARE:  Care Discussed with Consultants/Other Providers:  Neurology re: EEG findings

## 2022-12-01 NOTE — SWALLOW VFSS/MBS ASSESSMENT ADULT - ADDITIONAL RECOMMENDATIONS
Swallow: 1. Pt will complete dysphagia exercises to improve swallow function.    May consider repeat MBS pending improvement in pt swallow profile and neurology w/u.

## 2022-12-01 NOTE — PROGRESS NOTE ADULT - ASSESSMENT
72M with a past medical history of  CAD, HTN, HLD, Afib (Eliquis held since SDH), R SDH evac in 9/2021 and more resent SDH s/p mechanical fall on 10/19 (s/p andexanet), initially presented to [facility]  for increased lethargy. Pt was treated for UTI, PNA. Admitted to NSGY service to rule out enlarging subdural hematoma ; given stable head CTs  there are no plans for surgical intervention per NSGY ; pt is for transfer to the Medicine service.

## 2022-12-01 NOTE — PROGRESS NOTE ADULT - PROBLEM SELECTOR PLAN 2
- R SDH evac in 9/2021 and more resent SDH s/p mechanical fall on 10/19 (s/p andexanet),  - presented w/increased lethargy  - admitted to NSGY service to rule out enlarging subdural hematoma. Repeat head CT's have been stable and show a low concern for subdural empyema. MRI does not show convincing diffusion restriction  - No plans for surgical intervention per NSGY   - EEG as above Proximal LLE DVT.   Consulted Dr. Echeverria - obtain CT abd venogram with run off - next steps may include thrombectomy, IVC filter, and anticoagulation.   Discussed with Dr. Whitehead (NSx) given recent SDH - if AC is required, Dr. Whitehead said we can try to start heparin gtt (without a bolus) with PTT goal 50-70. Will need CT head imaging after starting AC.   Plan of care discussed with the patient's spouse who is in agreement with the case as outlined.

## 2022-12-01 NOTE — CHART NOTE - NSCHARTNOTEFT_GEN_A_CORE
While there is no absolute neurosurgical contraindication to systemic anti coagulation, there does exist an increased risk of intracranial hemorrhage which can result in significant morbidity including but not limited to headache, seizure, stroke, paralysis, and in severe cases even death.    The risks and benefits of starting systemic anticoagulation must be considered carefully in the setting of the patients medical history and current clinical condition.    If AC is started...  - Recommend NO bolus with  - Recommend low ptt goal of ~50-65  - Recommend obtaining follow up CTH 24 hours after patient is therapeutic and to notify neurosurgery immediately with any changes in the patients neurologic exam

## 2022-12-01 NOTE — CONSULT NOTE ADULT - SUBJECTIVE AND OBJECTIVE BOX
HPI:  History obtained from wife Mony as patient unable to communicate with me at this time.   72 y.o. male with h/o Type 2 DM diagnosed about 8 years ago. Seen by Dr. Kay 7/2021. Follows with cardiology for a fib and HTN.     He reports neuropathy which worsened in 2020 after a fall. Also reports hand tingling. Seeing neurology and diagnosed with peripheral neuropathy. Taking Gabapentin. Seeing podiatry. Was seeing nephrology for proteinuria. No hx of retinopathy. Being followed for glaucoma.    Was taking Metformin 1,000 mg BID. No GI side effects. Has been off medication since at least 3/2022 s/p fall with rehab admission.     Mother with hx of Type 2 DM. No reported hypoglycemia or symptoms of hypoglycemia.     Also hx of BPH, CAD, HTN/HLD, Afib (eliquis held since SDH), R SDH evac in 9/2021 and more resent SDH s/p mechanical fall on 10/19 (s/p andexanet), initially presented to [facility] for increased lethargy. Now transferred to [facility] for further management.   At [facility] hospital he had fever/leukocytosis, urosepsis, PNA. Most recent course of abx was completed today.   CT's have been stable and show a low concern for subdural empyema given MRI does not show convincing diffusion restriction (shine through).   Per wife, compared to baseline, he is more tired and requires more stimuli for verbal output.   Will admit to floor and rule out enlarging subdural hematoma.      PAST MEDICAL & SURGICAL HISTORY:  TBI (traumatic brain injury)      HTN (hypertension)      Atrial fibrillation      Peripheral neuropathy      Major depression      HLD (hyperlipidemia)      CAD (coronary artery disease)      BPH (benign prostatic hyperplasia)      Pneumonia due to COVID-19 virus  June 2022      Hemorrhage in the brain      No significant past surgical history          FAMILY HISTORY: Mother- Type 2 DM       Social History: No tobacco or alcohol use    Outpatient Medications:  · 	Dextrose 5% with 0.45% NaCl intravenous solution: 1000 milliliter(s) intravenous every 24 hours  · 	Cardizem  mg/24 hours oral capsule, extended release: 1 cap(s) orally once a day   · 	rosuvastatin 10 mg oral tablet: 1 tab(s) orally once a day  · 	latanoprost 0.005% ophthalmic solution: 1 drop(s) to each affected eye once a day (in the evening)  · 	mirtazapine 7.5 mg oral tablet: 1 tab(s) orally once a day (at bedtime)  · 	gabapentin 400 mg oral capsule: 1 cap(s) orally 3 times a day  · 	methylphenidate 10 mg oral tablet: 2 pills at 8AM, 1 pill at 1PM    MEDICATIONS  (STANDING):  atorvastatin 40 milliGRAM(s) Oral at bedtime  cholecalciferol 1000 Unit(s) Oral daily  dextrose 5%. 1000 milliLiter(s) (50 mL/Hr) IV Continuous <Continuous>  dextrose 5%. 1000 milliLiter(s) (100 mL/Hr) IV Continuous <Continuous>  dextrose 50% Injectable 25 Gram(s) IV Push once  dextrose 50% Injectable 12.5 Gram(s) IV Push once  dextrose 50% Injectable 25 Gram(s) IV Push once  diltiazem    milliGRAM(s) Oral daily  enoxaparin Injectable 40 milliGRAM(s) SubCutaneous <User Schedule>  gabapentin 400 milliGRAM(s) Oral three times a day  glucagon  Injectable 1 milliGRAM(s) IntraMuscular once  hydrALAZINE 50 milliGRAM(s) Oral every 8 hours  insulin lispro (ADMELOG) corrective regimen sliding scale   SubCutaneous three times a day before meals  latanoprost 0.005% Ophthalmic Solution 1 Drop(s) Both EYES at bedtime  losartan 50 milliGRAM(s) Oral every 12 hours  methylphenidate 20 milliGRAM(s) Oral <User Schedule>  mirtazapine 7.5 milliGRAM(s) Oral at bedtime  sodium chloride 0.9%. 1000 milliLiter(s) (60 mL/Hr) IV Continuous <Continuous>    MEDICATIONS  (PRN):  acetaminophen     Tablet .. 650 milliGRAM(s) Oral every 6 hours PRN Mild Pain (1 - 3)  aluminum hydroxide/magnesium hydroxide/simethicone Suspension 30 milliLiter(s) Oral every 4 hours PRN Dyspepsia  dextrose Oral Gel 15 Gram(s) Oral once PRN Blood Glucose LESS THAN 70 milliGRAM(s)/deciliter  ondansetron Injectable 4 milliGRAM(s) IV Push once PRN Nausea      Allergies    No Known Allergies    Intolerances      Review of Systems: Unable to assess at this time due to aphasia      PHYSICAL EXAM:  VITALS: T(C): 36.6 (12-01-22 @ 13:12)  T(F): 97.9 (12-01-22 @ 13:12), Max: 98.9 (11-30-22 @ 22:30)  HR: 88 (12-01-22 @ 13:12) (88 - 120)  BP: 117/77 (12-01-22 @ 13:12) (110/75 - 165/101)  RR:  (18 - 20)  SpO2:  (96% - 98%)  Wt(kg): --  GENERAL: NAD at this time  EYES: No proptosis, EOMI  HEENT:  Atraumatic, Normocephalic,   THYROID: Normal size, no palpable nodules  RESPIRATORY: Clear to auscultation bilaterally, full excursion, non-labored  CARDIOVASCULAR: Regular rhythm; No murmurs; no peripheral edema  GI: Soft, nontender, non distended, normal bowel sounds  SKIN: Dry, intact, No rashes or lesions  MUSCULOSKELETAL: no involuntary movements. No sarcopenia  NEURO: follows simple commands  PSYCH: +flat affect  CUSHING'S SIGNS: no striae      POCT Blood Glucose.: 98 mg/dL (12-01-22 @ 16:22)  POCT Blood Glucose.: 158 mg/dL (12-01-22 @ 11:26)  POCT Blood Glucose.: 157 mg/dL (12-01-22 @ 07:23)  POCT Blood Glucose.: 127 mg/dL (11-30-22 @ 21:12)  POCT Blood Glucose.: 141 mg/dL (11-30-22 @ 16:42)  POCT Blood Glucose.: 147 mg/dL (11-30-22 @ 11:31)  POCT Blood Glucose.: 166 mg/dL (11-30-22 @ 06:41)  POCT Blood Glucose.: 169 mg/dL (11-30-22 @ 05:33)  POCT Blood Glucose.: 128 mg/dL (11-29-22 @ 22:01)  POCT Blood Glucose.: 245 mg/dL (11-29-22 @ 12:54)  POCT Blood Glucose.: 254 mg/dL (11-29-22 @ 11:49)  POCT Blood Glucose.: 158 mg/dL (11-29-22 @ 08:53)  POCT Blood Glucose.: 160 mg/dL (11-29-22 @ 07:41)  POCT Blood Glucose.: 226 mg/dL (11-28-22 @ 21:22)  POCT Blood Glucose.: 187 mg/dL (11-28-22 @ 17:28)                              12.6   10.89 )-----------( 249      ( 01 Dec 2022 10:06 )             39.3       12-01    142  |  107  |  6<L>  ----------------------------<  184<H>  3.9   |  22  |  0.70    eGFR: 99    Ca    8.8      12-01  Mg     2.0     11-29  Phos  2.2     11-29    TPro  5.9<L>  /  Alb  3.1<L>  /  TBili  0.5  /  DBili  x   /  AST  15  /  ALT  33  /  AlkPhos  97  11-30    Thyroid Function Tests:            Radiology:

## 2022-12-01 NOTE — OCCUPATIONAL THERAPY INITIAL EVALUATION ADULT - DIAGNOSIS, OT EVAL
Pt presents with impaired cognition, muscle tone, decreased ROM, strength, balance, coordination, and functional endurance impacting ability to perform ADL/mobility Pt presents with impaired cognition, muscle tone, decreased ROM, strength, balance, coordination, and functional endurance impacting ability to perform ADL/mobility. However, pt functioning at baseline status

## 2022-12-01 NOTE — OCCUPATIONAL THERAPY INITIAL EVALUATION ADULT - LUE MMT, REHAB EVAL
Shoulder flex = 0/5, elbow flex = 0/5, elbow ext = 2/5, wrist flex = 1/5, wrist ext = 0/5, digit flex = 2/5, digit ext = 0/5

## 2022-12-01 NOTE — SWALLOW VFSS/MBS ASSESSMENT ADULT - DIAGNOSTIC IMPRESSIONS
Pt is a 72M with hx of R SDH evac in 9/2021 and more recent SDH s/p mechanical fall 10/19, admitted to [facility] for increased lethargy with course c/b fever/leukocytosis, urosepsis, PNA, transferred to [facility] to r/o subdural hematoma.  Pt p/w oropharyngeal dysphagia negatively impacted by suspected change in mentation/cognitive-status. Pt received in radiology suite with significant amount of puree in oral cavity; pt p/w absent swallow trigger to clear material despite max multi-modal cueing (dry tsp, tsp honey thick liquid wash, verbal cueing and tactile massage). Multiple episodes of coughing noted while pt orally holding material, suggestive of laryngeal penetration/aspiration. All material suctioned via Y[place]auer. Pt not a safe candidate for P.O. at this time given high risk of aspiration. MBS deferred at this time pending neuro w/u and improvement in swallow profile. Pt is a 72M with hx of R SDH evac in 9/2021 and more recent SDH s/p mechanical fall 10/19, admitted to [facility] for increased lethargy with course c/b fever/leukocytosis, urosepsis, PNA, transferred to [facility] to r/o subdural hematoma.  Pt p/w oropharyngeal dysphagia notable for absent swallow trigger. Pt received in radiology suite with significant amount of puree in oral cavity; pt p/w absent swallow trigger to clear material despite max multi-modal cueing (dry tsp, tsp honey thick liquid wash, verbal cueing and tactile massage). Multiple episodes of coughing noted while pt orally holding material, suggestive of possible laryngeal penetration/aspiration. All material suctioned via Y[place]auer. Pt not a safe candidate for P.O. at this time given high risk of aspiration. MBS deferred at this time pending neuro w/u and improvement in swallow profile.

## 2022-12-01 NOTE — CHART NOTE - NSCHARTNOTEFT_GEN_A_CORE
Patient found to have above the knee DVT extending to external left iliac. Given SDH, patient may be a candidate for IVC filter. Patient needs CT venogram of abdomen/pelvis. Vascular cardiology will evaluate tomorrow.

## 2022-12-01 NOTE — PROGRESS NOTE ADULT - PROBLEM SELECTOR PLAN 4
- poorly controlled with SBP 150s , DBP 100s  - d/w NSGY ; given SDH ; BP goal is SBP < 160  - on Dilitiazem CD 120mg po daily  - will restart other home meds : Hydralazine 50mg po tid, Losartan 50mg po bid - Eliquis held since SDH  - c/w Dilitazem CD 120mg po daily  - monitor HR

## 2022-12-01 NOTE — SWALLOW VFSS/MBS ASSESSMENT ADULT - SPECIFY REASON(S)
Patient made aware of 24/7 emergency services. to instrumentally assess swallow safety/function; r/o dysphagia.

## 2022-12-01 NOTE — CHART NOTE - NSCHARTNOTEFT_GEN_A_CORE
Primary team called asking if antiepileptics should be started for this patient.     EEG:  EEG Classification / Summary:  Abnormal EEG study  Infrequent right frontal sharp waves, though in context of breach effect  Continuous focal slowing, right frontocentral   Mild generalized background slowing  Breach rhythm over the right frontocentral region   -----------------------------------------------------------------------------------------------------    Clinical Impression:  Risk of seizures from the right frontal region.  There is evidence of right frontocentral structural abnormality  Mild diffuse/multifocal cerebral dysfunction, not specific as to etiology.  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  There is decreasing size of left para falcine subdural decreased from 1.5   cm to 1.2 cm  Redemonstrated is right frontal and right temporal lobe encephalomalacia   and gliosis with ex vacuo dilatation of the right frontal and temporal   horns.  Right frontal craniotomy.  Age-appropriate involutional changes and moderate microvascular ischemic   changes are present.      Spoke to epilepsy fellow who states that given EEG showed Infrequent right frontal sharp waves and underlying structural deformities in brain (for which EEG discharges are not atypical), starting AEDs would not be recommended at this time. His risk of actual seizures is relatively low at this time. Also, pt has had other reasons to be lethargic including PNA, urosepsis etc and if pt on AEDs, pt may be more altered due to medication side effects.     Thank you. Please call [phone] for any further questions.     Tristen Desir MD  Neurology PGY-3 72M with a past medical history of  CAD, HTN, HLD, Afib (Eliquis held since SDH), R SDH evac in 9/2021 and more resent SDH s/p mechanical fall on 10/19 (s/p andexanet), initially presented to [facility]  for increased lethargy. Pt was treated for UTI, PNA. Admitted to NSGY service to rule out enlarging subdural hematoma ; given stable head CTs  there are no plans for surgical intervention per NSGY ; pt is on  Medicine service.    Primary team called asking if antiepileptics should be started for this patient.     EEG:  EEG Classification / Summary:  Abnormal EEG study  Infrequent right frontal sharp waves, though in context of breach effect  Continuous focal slowing, right frontocentral   Mild generalized background slowing  Breach rhythm over the right frontocentral region   -----------------------------------------------------------------------------------------------------    Clinical Impression:  Risk of seizures from the right frontal region.  There is evidence of right frontocentral structural abnormality  Mild diffuse/multifocal cerebral dysfunction, not specific as to etiology.  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  There is decreasing size of left para falcine subdural decreased from 1.5   cm to 1.2 cm  Redemonstrated is right frontal and right temporal lobe encephalomalacia   and gliosis with ex vacuo dilatation of the right frontal and temporal   horns.  Right frontal craniotomy.  Age-appropriate involutional changes and moderate microvascular ischemic   changes are present.      Spoke to epilepsy fellow who states that given EEG showed Infrequent right frontal sharp waves and underlying structural deformities in brain (for which EEG discharges are not atypical), starting AEDs would not be recommended at this time. His risk of actual seizures is relatively low at this time. Also, pt has had other reasons to be lethargic including PNA, urosepsis etc and if pt on AEDs, pt may be more altered due to medication side effects.     Thank you. Please call [phone] for any further questions.     Tristen Desir MD  Neurology PGY-3 72M with a past medical history of  CAD, HTN, HLD, Afib (Eliquis held since SDH), R SDH evac in 9/2021 and more resent SDH s/p mechanical fall on 10/19 (s/p andexanet), initially presented to [facility]  for increased lethargy. Pt was treated for UTI, PNA. Admitted to NSGY service to rule out enlarging subdural hematoma ; given stable head CTs  there are no plans for surgical intervention per NSGY ; pt is on  Medicine service.    Primary team called asking if antiepileptics should be started for this patient.     EEG:  EEG Classification / Summary:  Abnormal EEG study  Infrequent right frontal sharp waves, though in context of breach effect  Continuous focal slowing, right frontocentral   Mild generalized background slowing  Breach rhythm over the right frontocentral region   -----------------------------------------------------------------------------------------------------  Clinical Impression:  Risk of seizures from the right frontal region.  There is evidence of right frontocentral structural abnormality  Mild diffuse/multifocal cerebral dysfunction, not specific as to etiology.  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  There is decreasing size of left para falcine subdural decreased from 1.5   cm to 1.2 cm  Redemonstrated is right frontal and right temporal lobe encephalomalacia   and gliosis with ex vacuo dilatation of the right frontal and temporal   horns.  Right frontal craniotomy.  Age-appropriate involutional changes and moderate microvascular ischemic   changes are present.      Spoke to epilepsy fellow who states that given EEG showed Infrequent right frontal sharp waves and underlying structural deformities in brain (for which EEG discharges are not atypical), starting AEDs would not be recommended at this time. His risk of actual seizures is relatively low at this time. Also, pt has had other reasons to be lethargic including PNA, urosepsis etc and if pt on AEDs, pt may be more altered due to medication side effects.     Patient can follow up with epilepsy neurology at [address] [place] [address] after discharge. Please instruct the patient to call [phone] to schedule this appointment.     Thank you. Please call [phone] for any further questions.     Tristen Desir MD  Neurology PGY-3

## 2022-12-01 NOTE — SWALLOW VFSS/MBS ASSESSMENT ADULT - SLP GENERAL OBSERVATIONS
Pt received in radiology suite; secure in URI chair. Pt AOx0, followed command to open mouth and eyes, otherwise, absent direction following. No verbal output with 1x "no" nod when asked if pt can swallow. Noted mildly lethargic, opens eyes when verbally cued, but eventually slowly closes them. Pt received with oral cavity filled with significant amount of applesauce filling b/l and anterior sulci; pt unable to swallow despite max multi-modal cueing. Material suctioned via Yankauer and pt returned to unit. Pt received in radiology suite; secure in URI chair. Pt AOx0, followed command to open mouth and eyes, otherwise, absent direction following. No verbal output with 1x "no" nod when asked if pt can swallow. Noted mildly lethargic, opens eyes when verbally cued, but eventually slowly closes them. Pt received with oral cavity filled with significant amount of applesauce filling b/l and anterior sulci; pt unable to swallow despite max multi-modal cueing. Material suctioned via Yankauer and pt returned to unit. See impressions and recommendations for additional details.

## 2022-12-01 NOTE — PROGRESS NOTE ADULT - PROBLEM SELECTOR PLAN 3
- Eliquis held since SDH  - c/w Dilitazem CD 120mg po daily  - monitor HR - R SDH evac in 9/2021 and more resent SDH s/p mechanical fall on 10/19 (s/p andexanet),  - presented w/increased lethargy  - admitted to NSGY service to rule out enlarging subdural hematoma. Repeat head CT's have been stable and show a low concern for subdural empyema. MRI does not show convincing diffusion restriction  - No plans for surgical intervention per NSGY   - EEG as above

## 2022-12-01 NOTE — PROGRESS NOTE ADULT - SUBJECTIVE AND OBJECTIVE BOX
[facility] Division of Hospital Medicine  Prosper Newell MD, MORE  I'm reachable on [employer] Teams   Off hours:  [phone] ([facility] pager)    Patient is a 72y old  Male who presents with a chief complaint of Infectious workup (01 Dec 2022 16:24)      SUBJECTIVE / OVERNIGHT EVENTS:  No events overnight.     MEDICATIONS  (STANDING):  atorvastatin 40 milliGRAM(s) Oral at bedtime  cholecalciferol 1000 Unit(s) Oral daily  dextrose 5%. 1000 milliLiter(s) (50 mL/Hr) IV Continuous <Continuous>  dextrose 5%. 1000 milliLiter(s) (100 mL/Hr) IV Continuous <Continuous>  dextrose 50% Injectable 25 Gram(s) IV Push once  dextrose 50% Injectable 12.5 Gram(s) IV Push once  dextrose 50% Injectable 25 Gram(s) IV Push once  diltiazem    milliGRAM(s) Oral daily  enoxaparin Injectable 40 milliGRAM(s) SubCutaneous <User Schedule>  gabapentin 400 milliGRAM(s) Oral three times a day  glucagon  Injectable 1 milliGRAM(s) IntraMuscular once  hydrALAZINE 50 milliGRAM(s) Oral every 8 hours  insulin lispro (ADMELOG) corrective regimen sliding scale   SubCutaneous three times a day before meals  latanoprost 0.005% Ophthalmic Solution 1 Drop(s) Both EYES at bedtime  losartan 50 milliGRAM(s) Oral every 12 hours  methylphenidate 20 milliGRAM(s) Oral <User Schedule>  mirtazapine 7.5 milliGRAM(s) Oral at bedtime  sodium chloride 0.9%. 1000 milliLiter(s) (60 mL/Hr) IV Continuous <Continuous>    MEDICATIONS  (PRN):  acetaminophen     Tablet .. 650 milliGRAM(s) Oral every 6 hours PRN Mild Pain (1 - 3)  aluminum hydroxide/magnesium hydroxide/simethicone Suspension 30 milliLiter(s) Oral every 4 hours PRN Dyspepsia  dextrose Oral Gel 15 Gram(s) Oral once PRN Blood Glucose LESS THAN 70 milliGRAM(s)/deciliter  ondansetron Injectable 4 milliGRAM(s) IV Push once PRN Nausea    CAPILLARY BLOOD GLUCOSE      POCT Blood Glucose.: 98 mg/dL (01 Dec 2022 16:22)  POCT Blood Glucose.: 158 mg/dL (01 Dec 2022 11:26)  POCT Blood Glucose.: 157 mg/dL (01 Dec 2022 07:23)  POCT Blood Glucose.: 127 mg/dL (30 Nov 2022 21:12)  POCT Blood Glucose.: 141 mg/dL (30 Nov 2022 16:42)    I&O's Summary    01 Dec 2022 07:01  -  01 Dec 2022 16:36  --------------------------------------------------------  IN: 280 mL / OUT: 0 mL / NET: 280 mL        PHYSICAL EXAM:  Vital Signs Last 24 Hrs  T(C): 36.6 (01 Dec 2022 13:12), Max: 37.2 (30 Nov 2022 22:30)  T(F): 97.9 (01 Dec 2022 13:12), Max: 98.9 (30 Nov 2022 22:30)  HR: 88 (01 Dec 2022 13:12) (88 - 120)  BP: 117/77 (01 Dec 2022 13:12) (110/75 - 165/101)  BP(mean): --  RR: 20 (01 Dec 2022 13:12) (18 - 20)  SpO2: 98% (01 Dec 2022 13:12) (96% - 98%)    Parameters below as of 01 Dec 2022 13:12  Patient On (Oxygen Delivery Method): room air        CONSTITUTIONAL: NAD, well-developed, well-groomed  EYES: PERRLA; EOMI, conjunctiva and sclera clear  ENMT: Moist oral mucosa, no pharyngeal injection or exudates; normal dentition  NECK: Supple, no palpable masses; no thyromegaly  RESPIRATORY: Normal respiratory effort; lungs are clear to auscultation bilaterally  CARDIOVASCULAR: Regular rate and rhythm, normal S1 and S2, no murmur/rub/gallop; No lower extremity edema; Peripheral pulses are 2+ bilaterally  ABDOMEN: normoactive bowel sounds, soft, nontender to palpation, no rebound/guarding; no distension   MUSCULOSKELETAL:  Normal gait; no clubbing or cyanosis of digits; no joint swelling or tenderness to palpation  PSYCH: A+O to person, place, and time; affect appropriate  NEUROLOGY: CN 2-12 are intact and symmetric; no gross sensory deficits   SKIN: No rashes; no palpable lesions    LABS:                        12.6   10.89 )-----------( 249      ( 01 Dec 2022 10:06 )             39.3     12-01    142  |  107  |  6<L>  ----------------------------<  184<H>  3.9   |  22  |  0.70    Ca    8.8      01 Dec 2022 10:06    TPro  5.9<L>  /  Alb  3.1<L>  /  TBili  0.5  /  DBili  x   /  AST  15  /  ALT  33  /  AlkPhos  97  11-30              Culture - Blood (collected 30 Nov 2022 07:55)  Source: .Blood Blood-Peripheral  Preliminary Report (01 Dec 2022 13:01):    No growth to date.        RADIOLOGY & ADDITIONAL TESTS:    Lab Results Reviewed:     Imaging Personally Reviewed:     Telemetry reviewed:     ECG Personally Reviewed:       COORDINATION OF CARE:  Care Discussed with Consultants/Other Providers:    Prior or Outpatient Records Reviewed:

## 2022-12-01 NOTE — PROGRESS NOTE ADULT - PROBLEM SELECTOR PLAN 9
Med rec completed in [place] DVT ppx : Lovenox 40mg SC q 24h    PT reccs Home with Home PT for strengthening, bed mob, transfer, gait and balance training; Home PT  (likely dc home once medically optimized)

## 2022-12-01 NOTE — CONSULT NOTE ADULT - PROBLEM SELECTOR RECOMMENDATION 4
c/w gabapentin    Will sign off at this time. Can d/c on metformin 1000mg BID with outpatient follow-up. Please reconsult with additional questions if needed.        Juan Carlos Rojas D.O  [phone]

## 2022-12-01 NOTE — PROGRESS NOTE ADULT - PROBLEM SELECTOR PLAN 5
- Hb A1C 9.3  - not on any medications at this time ; was previously on Metformin ; last took it about 1 year ago  - c/w RHONDA  - FS QAC QHS  - carb- consistent diet  - likely resume Metformin 1000mg PO BID at home (used to be on this in the past) - poorly controlled with SBP 150s , DBP 100s  - d/w NSGY ; given SDH ; BP goal is SBP < 160  - on Dilitiazem CD 120mg po daily  - will restart other home meds : Hydralazine 50mg po tid, Losartan 50mg po bid

## 2022-12-01 NOTE — SWALLOW VFSS/MBS ASSESSMENT ADULT - H & P REVIEW
72M hx BPH, CAD, HTN/HLD, Afib (eliquis held since SDH), R SDH evac in 9/2021 and more recent SDH s/p mechanical fall on 10/19 (s/p andexanet), initially presented to [facility] for increased lethargy, now txfrd to [facility] for further neurosurgical assessment to r/o subdural hematoma. Neurosurgery indicates no acute intervention. At OSH he had fever/leukocytosis, urosepsis, and aspiration PNA; abx completed. CT's have been stable and low concern for subdural empyema given MRI does not show convincing diffusion restriction./yes

## 2022-12-01 NOTE — OCCUPATIONAL THERAPY INITIAL EVALUATION ADULT - PERTINENT HX OF CURRENT PROBLEM, REHAB EVAL
72 male with a past medical history of BPH, CAD, HTN/HLD, Afib (eliquis held since SDH), R SDH evac in 9/2021 and more resent SDH s/p mechanical fall on 10/19 (s/p andexanet), initially presented to [facility] for increased lethargy. Now transferred to [facility] for further management. At [facility] hospital he had fever/leukocytosis, urosepsis, PNA. Most recent course of abx was completed today.   CT's have been stable and show a low concern for subdural empyema given MRI does not show convincing diffusion restriction (shine through). Per wife, compared to baseline, he is more tired and requires more stimuli for verbal output. Will admit to floor and rule out enlarging subdural hematoma. Per neuro, no surgical intervention needed.  CTH 11/29: Decreasing size of left temporal convexity and left para falcine chronic subdural hematomas.

## 2022-12-01 NOTE — PROGRESS NOTE ADULT - CONVERSATION DETAILS
I discussed all findings with the patient's wife.     Regarding the MBS findings, we spoke about his future risk for aspiration pneumonias. She is not interested in feeding tubes. Plans to continue feeds as usual, but wants to get a second opinion from an ENT doctor outpatient.     Regarding the LLE DVT, she's agreeable to the CT scan with possible procedures after as required (thrombectomy, IVC filter, anticoagulation, etc.)    She understands that the patient is very ill. Wants to pursue all treatment options. Not interested in comfort care or hospice at this time.

## 2022-12-01 NOTE — OCCUPATIONAL THERAPY INITIAL EVALUATION ADULT - ADDITIONAL COMMENTS
Per wife at bedside, pt lives with wife and has 24-hr HHA. PTA, pt required assist with ALL ADL/mobility. Pt occasionally self-fed. Pt was receiving bed baths for showering and wearing diapers for toileting, as he was not able to use urinal or commode. PTA, pt had home OT, PT, and speech. Pt owns eladio lift, hospital bed, and wheelchair. Per chart review, pt lives in a private house +1 step to enter. Lives with spouse, niece and 24/7 HHA. All needs met on main floor. PTA, pt was non-ambulatory and was wheelchair bound. Per wife at bedside, pt required assist with ALL ADL/mobility. Pt occasionally self-fed. Pt was receiving bed baths for showering and wearing diapers for toileting, as he was not able to use urinal or commode. Pt owns eladio lift, hospital bed, and wheelchair.

## 2022-12-01 NOTE — PROGRESS NOTE ADULT - PROBLEM SELECTOR PLAN 6
- restart Mirtazapine 7.5mg po qbedtime  - c/w Ritalin 20mg po qd at 8 AM - Hb A1C 9.3  - not on any medications at this time ; was previously on Metformin ; last took it about 1 year ago  - c/w RHONDA  - FS QAC QHS  - carb- consistent diet  - likely resume Metformin 1000mg PO BID at home (used to be on this in the past)

## 2022-12-01 NOTE — SWALLOW VFSS/MBS ASSESSMENT ADULT - COMMENTS
hx con't  22 Bedside swallow evaluation completed. Pt p/w oropharyngeal dysphagia with coughing on thin liquids with rx for MBS and oral diet of soft-bite-sized and mildly thick liquids.   IMAGIN/29 CT head: Impression: Decreasing size of left temporal convexity and left para falcine chronic subdural hematomas.    EEG report 22: Clinical Impression: Risk of seizures from the right frontal region.  There is evidence of right frontocentral structural abnormality  Mild diffuse/multifocal cerebral dysfunction, not specific as to etiology.  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 Per MBS report, defer continuation of PO diet level to MD given today's limited assessment due to the patient's inability to consistently trigger a pharyngeal swallow, despite max cues, ultimately requiring Yankauer suctioning to safely remove bolus from oral cavity.

## 2022-12-01 NOTE — SWALLOW VFSS/MBS ASSESSMENT ADULT - ADDITIONAL INFORMATION
MBS deferred at this time as pt not a candidate for P.O. given absent swallow trigger with residue in oral cavity despite max-multi-modal cueing. See impressions and recommendations for additional details.

## 2022-12-02 LAB
APPEARANCE UR: ABNORMAL
BACTERIA # UR AUTO: ABNORMAL
BILIRUB UR-MCNC: NEGATIVE — SIGNIFICANT CHANGE UP
COLOR SPEC: COLORLESS — SIGNIFICANT CHANGE UP
DIFF PNL FLD: ABNORMAL
EPI CELLS # UR: 0 /HPF — SIGNIFICANT CHANGE UP
GLUCOSE BLDC GLUCOMTR-MCNC: 128 MG/DL — HIGH (ref 70–99)
GLUCOSE BLDC GLUCOMTR-MCNC: 132 MG/DL — HIGH (ref 70–99)
GLUCOSE BLDC GLUCOMTR-MCNC: 223 MG/DL — HIGH (ref 70–99)
GLUCOSE BLDC GLUCOMTR-MCNC: 230 MG/DL — HIGH (ref 70–99)
GLUCOSE UR QL: NEGATIVE — SIGNIFICANT CHANGE UP
HYALINE CASTS # UR AUTO: 0 /LPF — SIGNIFICANT CHANGE UP (ref 0–2)
KETONES UR-MCNC: NEGATIVE — SIGNIFICANT CHANGE UP
LEUKOCYTE ESTERASE UR-ACNC: ABNORMAL
NITRITE UR-MCNC: NEGATIVE — SIGNIFICANT CHANGE UP
PH UR: 7.5 — SIGNIFICANT CHANGE UP (ref 5–8)
PROT UR-MCNC: ABNORMAL
RBC CASTS # UR COMP ASSIST: 463 /HPF — HIGH (ref 0–4)
SP GR SPEC: 1.02 — SIGNIFICANT CHANGE UP (ref 1.01–1.02)
UROBILINOGEN FLD QL: NEGATIVE — SIGNIFICANT CHANGE UP
WBC UR QL: 17 /HPF — HIGH (ref 0–5)

## 2022-12-02 PROCEDURE — 99233 SBSQ HOSP IP/OBS HIGH 50: CPT

## 2022-12-02 PROCEDURE — 99223 1ST HOSP IP/OBS HIGH 75: CPT

## 2022-12-02 RX ORDER — ENOXAPARIN SODIUM 100 MG/ML
40 INJECTION SUBCUTANEOUS EVERY 12 HOURS
Refills: 0 | Status: DISCONTINUED | OUTPATIENT
Start: 2022-12-02 | End: 2022-12-02

## 2022-12-02 RX ORDER — ENOXAPARIN SODIUM 100 MG/ML
40 INJECTION SUBCUTANEOUS EVERY 12 HOURS
Refills: 0 | Status: COMPLETED | OUTPATIENT
Start: 2022-12-02 | End: 2022-12-03

## 2022-12-02 RX ORDER — ENOXAPARIN SODIUM 100 MG/ML
50 INJECTION SUBCUTANEOUS EVERY 12 HOURS
Refills: 0 | Status: COMPLETED | OUTPATIENT
Start: 2022-12-03 | End: 2022-12-03

## 2022-12-02 RX ORDER — METOPROLOL TARTRATE 50 MG
5 TABLET ORAL ONCE
Refills: 0 | Status: COMPLETED | OUTPATIENT
Start: 2022-12-02 | End: 2022-12-02

## 2022-12-02 RX ORDER — ENOXAPARIN SODIUM 100 MG/ML
60 INJECTION SUBCUTANEOUS EVERY 12 HOURS
Refills: 0 | Status: DISCONTINUED | OUTPATIENT
Start: 2022-12-04 | End: 2022-12-05

## 2022-12-02 RX ADMIN — Medication 5 MILLIGRAM(S): at 03:42

## 2022-12-02 RX ADMIN — Medication 20 MILLIGRAM(S): at 07:55

## 2022-12-02 RX ADMIN — ENOXAPARIN SODIUM 40 MILLIGRAM(S): 100 INJECTION SUBCUTANEOUS at 15:23

## 2022-12-02 RX ADMIN — Medication 50 MILLIGRAM(S): at 18:13

## 2022-12-02 RX ADMIN — Medication 4: at 12:35

## 2022-12-02 RX ADMIN — LATANOPROST 1 DROP(S): 0.05 SOLUTION/ DROPS OPHTHALMIC; TOPICAL at 21:24

## 2022-12-02 RX ADMIN — LOSARTAN POTASSIUM 50 MILLIGRAM(S): 100 TABLET, FILM COATED ORAL at 18:08

## 2022-12-02 NOTE — PROGRESS NOTE ADULT - PROBLEM SELECTOR PLAN 9
DVT ppx : Lovenox (dosing as above)    PT reccs Home with Home PT for strengthening, bed mob, transfer, gait and balance training; Home PT  (likely dc home once medically optimized)

## 2022-12-02 NOTE — CHART NOTE - NSCHARTNOTEFT_GEN_A_CORE
72M with a past medical history of  CAD, HTN, HLD, Afib, R SDH evac in 9/2021 and more resent SDH s/p mechanical fall on 10/19, initially presented to [facility] hospital  for increased lethargy. Pt was treated for UTI, PNA. Admitted to NSGY service to rule out enlarging subdural hematoma ; no plans for surgical intervention. Pt +vEEG; neuro notes: His risk of actual seizures is relatively low at this time.     SWALLOW COURSE:  11/30/22 Bedside swallow evaluation completed. Pt p/w oropharyngeal dysphagia with coughing on thin liquids with rx for MBS and oral diet of soft-bite-sized and mildly thick liquids.   12/1/22 MBS attempted, however, pt was received in radiology suite with significant amount of puree in oral cavity; absent swallow trigger to clear material despite max multi-modal cueing. Multiple episodes of coughing noted, suggestive of possible laryngeal penetration/aspiration. All material suctioned via Yankauer. Pt not a safe candidate for P.O. at this time given high risk of aspiration and not appropriate for objective testing. Of note, pt with hx of oral holding behaviors on recent MBS 11/28/22 at [facility].    TODAY, extensive discussion held with spouse regarding MBS results and recommendations and spouse wishes for pt nutrition/hydration. Spouse corroborates report that pt demonstrates episodic pocketing requiring suctioning, but otherwise reports that when spose feeds, that pt can "swallow most of the time". Pt seen at bedside with spouse feeding breakfast; noted w/ similar swallow profile to initial bedside swallow evaluation on 11/30 with prolonged oral transport and subjectively delayed initiation of swallow with no overt s/sx of aspiration/penetration with soft-bite-sized and mildly thick liquids. No oral holding noted and no oral residue remains.    Given overall aspiration risk with pocketing episodes, pt is at increased risk for aspiration with oral diet and not a candidate for objective swallow testing. Spouse established wishes to continue current oral diet with acceptance of aspiration risk (PNA, intubation, death). Spouse and VERNON Cao educated regarding 1. safe swallow precautions (sitting upright, alert, with small bites/sips) 2. Ensuring oral cavity is clear after each bite and after medication administration 3. Strict oral hygiene.     IMPRESSIONS: Pt continues to p/w oropharyngeal dysphagia. Given pocketing episodes requiring suctioning, pt is at increased risk for aspiration with oral diet. Not a candidate for repeat objective swallow testing. Given established spouse wishes to continue current oral diet and all swallow precautions/education completed, no further SLP intervention or w/u is indicated at this time. This service to sign off.    RECOMMENDATIONS:  > NPO, with non-oral nutrition/hydration/medications based on aspiration risk, however, spouse established wishes to continue Soft-bite-sized and mildly thick liquids  >Maintain strict aspiration precautions which may reduce but not eliminate aspiration risk (upright, alert, small bites/sips, ensuring clear oral cavity after meals/meds)  >Maintain oral hygiene    D/W OLINDA Willingham    SPEECH PATHOLOGY  Ninoska Valdivia [facility] *Teams preferred* ([phone])

## 2022-12-02 NOTE — PROGRESS NOTE ADULT - PROBLEM SELECTOR PLAN 1
Proximal LLE DVT.   Consulted Dr. Echeverria - per discussion with NSx, starting lovenox 40mg sc BID x 1 day, then 50mg BID x 1 day, then 60mg BID after.   Monitor for any s/s of bleeding, neurological changes. May require CT head imaging to ensure SDH stability.   Plan of care discussed with the patient's spouse who is in agreement with the case as outlined.

## 2022-12-02 NOTE — PROGRESS NOTE ADULT - PROBLEM SELECTOR PLAN 6
- Hb A1C 9.3  - not on any medications at this time ; was previously on Metformin ; last took it about 1 year ago  - c/w RHONDA  - FS QAC QHS  - carb- consistent diet  - per endocrine, resume Metformin 1000mg PO BID at home

## 2022-12-02 NOTE — PROGRESS NOTE ADULT - SUBJECTIVE AND OBJECTIVE BOX
Patient seen and examined at bedside.    --Anticoagulation--  enoxaparin Injectable 40 milliGRAM(s) SubCutaneous <User Schedule>    T(C): 36.8 (12-02-22 @ 01:04), Max: 36.8 (12-02-22 @ 01:04)  HR: 113 (12-02-22 @ 01:04) (88 - 113)  BP: 142/93 (12-02-22 @ 01:04) (110/75 - 142/93)  RR: 20 (12-02-22 @ 01:04) (19 - 20)  SpO2: 97% (12-02-22 @ 01:04) (96% - 98%)  Wt(kg): --    Exam: AO1, EOV, EOMI, L gaze preference, minimal verbal outut, L droop, FC w/prompting, BUE spontaneous,  hand (L>R), BLE minimal spontaneous movement.

## 2022-12-02 NOTE — CONSULT NOTE ADULT - SUBJECTIVE AND OBJECTIVE BOX
Vascular Cardiology Consult Note    DIRECT SERVICE NUMBER:  [phone]                 CC:      HPI:    71 yo M w/ PMH CAD, Afib previously on apixaban, CVA, SDH in 9/2021 s/p evac and 10/2022 from fall who presents with generalized weakness and sepsis found to have aspiration pneumonia now s/p treatment. He was initially at [facility] for treatment of his sepsis and noted on imaging possible enlargement of his SDH where he was transferred to [facility]. Per NSGY, not acute intervention needed at this time. Patient has difficulty verbally communicating. Per wife at bedside, patient has no hx of clots, was on apixaban for his Afib. She thinks patient's mother had hx of blood clots. He has been immobile for several months.        Allergies    No Known Allergies    Intolerances    	    MEDICATIONS:  diltiazem    milliGRAM(s) Oral daily  enoxaparin Injectable 40 milliGRAM(s) SubCutaneous <User Schedule>  hydrALAZINE 50 milliGRAM(s) Oral every 8 hours  losartan 50 milliGRAM(s) Oral every 12 hours        acetaminophen     Tablet .. 650 milliGRAM(s) Oral every 6 hours PRN  gabapentin 400 milliGRAM(s) Oral three times a day  methylphenidate 20 milliGRAM(s) Oral <User Schedule>  mirtazapine 7.5 milliGRAM(s) Oral at bedtime  ondansetron Injectable 4 milliGRAM(s) IV Push once PRN    aluminum hydroxide/magnesium hydroxide/simethicone Suspension 30 milliLiter(s) Oral every 4 hours PRN    atorvastatin 40 milliGRAM(s) Oral at bedtime  dextrose 50% Injectable 25 Gram(s) IV Push once  dextrose 50% Injectable 12.5 Gram(s) IV Push once  dextrose 50% Injectable 25 Gram(s) IV Push once  dextrose Oral Gel 15 Gram(s) Oral once PRN  glucagon  Injectable 1 milliGRAM(s) IntraMuscular once  insulin lispro (ADMELOG) corrective regimen sliding scale   SubCutaneous three times a day before meals    cholecalciferol 1000 Unit(s) Oral daily  dextrose 5%. 1000 milliLiter(s) IV Continuous <Continuous>  dextrose 5%. 1000 milliLiter(s) IV Continuous <Continuous>  latanoprost 0.005% Ophthalmic Solution 1 Drop(s) Both EYES at bedtime  sodium chloride 0.9%. 1000 milliLiter(s) IV Continuous <Continuous>      PAST MEDICAL & SURGICAL HISTORY:  TBI (traumatic brain injury)      HTN (hypertension)      Atrial fibrillation      Peripheral neuropathy      Major depression      HLD (hyperlipidemia)      CAD (coronary artery disease)      BPH (benign prostatic hyperplasia)      Pneumonia due to COVID-19 virus  June 2022      Hemorrhage in the brain      No significant past surgical history          FAMILY HISTORY:      SOCIAL HISTORY:  unchanged    REVIEW OF SYSTEMS:    CONSTITUTIONAL: No fever, weight loss, or fatigue  EYES: No eye pain, visual disturbances, or discharge  ENT:  No difficulty hearing, tinnitus, vertigo; No sinus or throat pain  NECK: No pain or stiffness  RESPIRATORY:  No shortness of breath   CARDIOVASCULAR:  No Chest pain   GASTROINTESTINAL: No abdominal or epigastric pain. No nausea, vomiting, or hematemesis; No diarrhea or constipation. No melena or hematochezia.  GENITOURINARY: No dysuria, frequency, hematuria, or incontinence  NEUROLOGICAL: No headaches, memory loss, loss of strength, numbness, or tremors  LYMPH Nodes: No enlarged glands  ENDOCRINE: No heat or cold intolerance; No hair loss  MUSCULOSKELETAL: No joint pain or swelling; No muscle, back, or extremity pain  PSYCHIATRIC: No depression, anxiety, mood swings, or difficulty sleeping  HEME/LYMPH: No easy bruising, or bleeding gums  ALLERGY AND IMMUNOLOGIC: No hives or eczema	    [ x] All others negative	  [ ] Unable to obtain    PHYSICAL EXAM:  T(C): 36.6 (12-02-22 @ 09:24), Max: 36.8 (12-02-22 @ 01:04)  HR: 106 (12-02-22 @ 09:24) (88 - 113)  BP: 138/92 (12-02-22 @ 09:24) (110/75 - 143/95)  RR: 20 (12-02-22 @ 09:24) (19 - 20)  SpO2: 97% (12-02-22 @ 09:24) (96% - 98%)  Wt(kg): --  I&O's Summary    01 Dec 2022 07:01  -  02 Dec 2022 07:00  --------------------------------------------------------  IN: 520 mL / OUT: 0 mL / NET: 520 mL        Appearance: No acute distress   Psychiatry:  Alert but no verbal output   Skin: No rashes, No ecchymoses, No cyanosis	  Extremities: Warm to touch, +2 DP pulses bilaterally L>R , no LE edema         LABS:	 	    CBC Full  -  ( 01 Dec 2022 10:06 )  WBC Count : 10.89 K/uL  Hemoglobin : 12.6 g/dL  Hematocrit : 39.3 %  Platelet Count - Automated : 249 K/uL  Mean Cell Volume : 78.9 fl  Mean Cell Hemoglobin : 25.3 pg  Mean Cell Hemoglobin Concentration : 32.1 gm/dL  Auto Neutrophil # : x  Auto Lymphocyte # : x  Auto Monocyte # : x  Auto Eosinophil # : x  Auto Basophil # : x  Auto Neutrophil % : x  Auto Lymphocyte % : x  Auto Monocyte % : x  Auto Eosinophil % : x  Auto Basophil % : x    12-01    142  |  107  |  6<L>  ----------------------------<  184<H>  3.9   |  22  |  0.70    Ca    8.8      01 Dec 2022 10:06            Assessment:  71 yo M w/ PMH CAD, Afib previously on apixaban, CVA, SDH in 9/2021 s/p evac and 10/2022 from fall who presents with generalized weakness and sepsis found to have aspiration pneumonia now s/p treatment. Vascular consulted for LLE DVT.     1. LLE DVT   2. SDH            Plan:    #LLE DVT   Per CT read, "Extensive left leg DVT involving the tibioperoneal trunk and   extending up the length of the left leg to include the popliteal,   superficial femoral, common femoral, saphenofemoral junction, left   external iliac, and left common iliac vein. Thrombus does not extend to   the IVC"     Patient is not on O2, notable chronic Afib, low suspicion for PE at this time. Per NSGY, can trial blood thinners but will need serial CT head given his SDH.   -Recommend to increase lovenox in stepwise fashion:   Lovenox 40mg BID   Lovenox 50mg BID   Lovenox 60mg BID   -Will defer IVC filter at this time          Thank you      Vascular Cardiology Service    Please call with any questions:   DIRECT SERVICE NUMBER:  [phone]          Vascular Cardiology Consult Note    DIRECT SERVICE NUMBER:  [phone]                 CC:      HPI:    73 yo M w/ PMH CAD, Afib previously on apixaban, CVA, SDH in 9/2021 s/p evac and 10/2022 from fall who presents with generalized weakness and sepsis found to have aspiration pneumonia now s/p treatment. He was initially at [facility] for treatment of his sepsis and noted on imaging possible enlargement of his SDH where he was transferred to [facility]. Per NSGY, not acute intervention needed at this time. Patient has difficulty verbally communicating. Per wife at bedside, patient has no hx of clots, was on apixaban for his Afib. She thinks patient's mother had hx of blood clots. He has been immobile for several months.        Allergies    No Known Allergies    Intolerances    	    MEDICATIONS:  diltiazem    milliGRAM(s) Oral daily  enoxaparin Injectable 40 milliGRAM(s) SubCutaneous <User Schedule>  hydrALAZINE 50 milliGRAM(s) Oral every 8 hours  losartan 50 milliGRAM(s) Oral every 12 hours        acetaminophen     Tablet .. 650 milliGRAM(s) Oral every 6 hours PRN  gabapentin 400 milliGRAM(s) Oral three times a day  methylphenidate 20 milliGRAM(s) Oral <User Schedule>  mirtazapine 7.5 milliGRAM(s) Oral at bedtime  ondansetron Injectable 4 milliGRAM(s) IV Push once PRN    aluminum hydroxide/magnesium hydroxide/simethicone Suspension 30 milliLiter(s) Oral every 4 hours PRN    atorvastatin 40 milliGRAM(s) Oral at bedtime  dextrose 50% Injectable 25 Gram(s) IV Push once  dextrose 50% Injectable 12.5 Gram(s) IV Push once  dextrose 50% Injectable 25 Gram(s) IV Push once  dextrose Oral Gel 15 Gram(s) Oral once PRN  glucagon  Injectable 1 milliGRAM(s) IntraMuscular once  insulin lispro (ADMELOG) corrective regimen sliding scale   SubCutaneous three times a day before meals    cholecalciferol 1000 Unit(s) Oral daily  dextrose 5%. 1000 milliLiter(s) IV Continuous <Continuous>  dextrose 5%. 1000 milliLiter(s) IV Continuous <Continuous>  latanoprost 0.005% Ophthalmic Solution 1 Drop(s) Both EYES at bedtime  sodium chloride 0.9%. 1000 milliLiter(s) IV Continuous <Continuous>      PAST MEDICAL & SURGICAL HISTORY:  TBI (traumatic brain injury)      HTN (hypertension)      Atrial fibrillation      Peripheral neuropathy      Major depression      HLD (hyperlipidemia)      CAD (coronary artery disease)      BPH (benign prostatic hyperplasia)      Pneumonia due to COVID-19 virus  June 2022      Hemorrhage in the brain      No significant past surgical history          FAMILY HISTORY:      SOCIAL HISTORY:  unchanged    REVIEW OF SYSTEMS:    CONSTITUTIONAL: No fever, weight loss, or fatigue  EYES: No eye pain, visual disturbances, or discharge  ENT:  No difficulty hearing, tinnitus, vertigo; No sinus or throat pain  NECK: No pain or stiffness  RESPIRATORY:  No shortness of breath   CARDIOVASCULAR:  No Chest pain   GASTROINTESTINAL: No abdominal or epigastric pain. No nausea, vomiting, or hematemesis; No diarrhea or constipation. No melena or hematochezia.  GENITOURINARY: No dysuria, frequency, hematuria, or incontinence  NEUROLOGICAL: No headaches, memory loss, loss of strength, numbness, or tremors  LYMPH Nodes: No enlarged glands  ENDOCRINE: No heat or cold intolerance; No hair loss  MUSCULOSKELETAL: No joint pain or swelling; No muscle, back, or extremity pain  PSYCHIATRIC: No depression, anxiety, mood swings, or difficulty sleeping  HEME/LYMPH: No easy bruising, or bleeding gums  ALLERGY AND IMMUNOLOGIC: No hives or eczema	    [ x] All others negative	  [ ] Unable to obtain    PHYSICAL EXAM:  T(C): 36.6 (12-02-22 @ 09:24), Max: 36.8 (12-02-22 @ 01:04)  HR: 106 (12-02-22 @ 09:24) (88 - 113)  BP: 138/92 (12-02-22 @ 09:24) (110/75 - 143/95)  RR: 20 (12-02-22 @ 09:24) (19 - 20)  SpO2: 97% (12-02-22 @ 09:24) (96% - 98%)  Wt(kg): --  I&O's Summary    01 Dec 2022 07:01  -  02 Dec 2022 07:00  --------------------------------------------------------  IN: 520 mL / OUT: 0 mL / NET: 520 mL        Appearance: No acute distress   Psychiatry:  Alert but no verbal output   Skin: No rashes, No ecchymoses, No cyanosis	  Extremities: Warm to touch, +2 DP pulses bilaterally L>R , no LE edema         LABS:	 	    CBC Full  -  ( 01 Dec 2022 10:06 )  WBC Count : 10.89 K/uL  Hemoglobin : 12.6 g/dL  Hematocrit : 39.3 %  Platelet Count - Automated : 249 K/uL  Mean Cell Volume : 78.9 fl  Mean Cell Hemoglobin : 25.3 pg  Mean Cell Hemoglobin Concentration : 32.1 gm/dL  Auto Neutrophil # : x  Auto Lymphocyte # : x  Auto Monocyte # : x  Auto Eosinophil # : x  Auto Basophil # : x  Auto Neutrophil % : x  Auto Lymphocyte % : x  Auto Monocyte % : x  Auto Eosinophil % : x  Auto Basophil % : x    12-01    142  |  107  |  6<L>  ----------------------------<  184<H>  3.9   |  22  |  0.70    Ca    8.8      01 Dec 2022 10:06            Assessment:  73 yo M w/ PMH CAD, Afib previously on apixaban, CVA, SDH in 9/2021 s/p evac and 10/2022 from fall who presents with generalized weakness and sepsis found to have aspiration pneumonia now s/p treatment. Vascular consulted for LLE DVT.     1. LLE DVT   2. SDH            Plan:    #LLE DVT   Per CT read, "Extensive left leg DVT involving the tibioperoneal trunk and   extending up the length of the left leg to include the popliteal,   superficial femoral, common femoral, saphenofemoral junction, left   external iliac, and left common iliac vein. Thrombus does not extend to   the IVC"     Patient is not on O2, notable chronic Afib, low suspicion for PE at this time. Likely provoked given patient's immobility although per wife, patient's mother may have had hx of blood clots. Per NSGY, can trial blood thinners but will need serial CT head given his SDH.   -Recommend to increase lovenox in stepwise fashion:   Lovenox 40mg BID today  Lovenox 50mg BID tomorrow if tolerates   Lovenox 60mg BID the next if tolerates   -Will defer IVC filter at this time   -CT head serial imaging per neurosurgery          Thank you      Vascular Cardiology Service    Please call with any questions:   DIRECT SERVICE NUMBER:  [phone]

## 2022-12-02 NOTE — PROGRESS NOTE ADULT - ASSESSMENT
72M with a past medical history of  CAD, HTN, HLD, Afib (Eliquis held since SDH), R SDH evac in 9/2021 and more resent SDH s/p mechanical fall on 10/19 (s/p andexanet), initially presented to [facility]  for increased lethargy. Pt was treated for UTI, PNA. Admitted to NSGY service to rule out enlarging subdural hematoma ; given stable head CTs  there are no plans for surgical intervention per NSGY ; pt is for transfer to the Medicine service.

## 2022-12-02 NOTE — PROGRESS NOTE ADULT - ASSESSMENT
HPI:  72 male with a past medical history of BPH, CAD, HTN/HLD, Afib (eliquis held since SDH), R SDH evac in 9/2021 and more resent SDH s/p mechanical fall on 10/19 (s/p andexanet), initially presented to [facility] for increased lethargy. Now transferred to [facility] for further management.   At [facility] hospital he had fever/leukocytosis, urosepsis, PNA. Most recent course of abx was completed today.   CT's have been stable and show a low concern for subdural empyema given MRI does not show convincing diffusion restriction (shine through).   Per wife, compared to baseline, he is more tired and requires more stimuli for verbal output.     Plan:  - No acute neurosurgery intervention  - No absolute neurosurgical contraindication to systemic anti coagulation, there does exist an increased risk of intracranial hemorrhage which can result in significant morbidity including but not limited to headache, seizure, stroke, paralysis, and in severe cases even death. The risks and benefits of starting systemic anticoagulation must be considered carefully in the setting of the patients medical history and current clinical condition.  If AC is necessary to start:  - Recommend NO bolus   - Recommend low ptt goal of ~50-65  - Recommend obtaining follow up CTH 24 hours after patient is therapeutic and to notify neurosurgery immediately with any changes in the patients neurologic exam.

## 2022-12-02 NOTE — PROGRESS NOTE ADULT - PROBLEM SELECTOR PLAN 5
continue home meds: Hydralazine 50mg po tid, Losartan 50mg po bid, Dilitiazem CD 120mg po daily  monitor vital signs

## 2022-12-02 NOTE — PROGRESS NOTE ADULT - SUBJECTIVE AND OBJECTIVE BOX
[facility] Division of Hospital Medicine  Prosper Newell MD, MORE  I'm reachable on [employer] Teams   Off hours:  [phone] ([facility] pager)    Patient is a 72y old  Male who presents with a chief complaint of Infectious workup (02 Dec 2022 09:38)      SUBJECTIVE / OVERNIGHT EVENTS:  No events overnight. Spouse at bedside - understands and agrees with the plan of care.     MEDICATIONS  (STANDING):  atorvastatin 40 milliGRAM(s) Oral at bedtime  cholecalciferol 1000 Unit(s) Oral daily  dextrose 5%. 1000 milliLiter(s) (50 mL/Hr) IV Continuous <Continuous>  dextrose 5%. 1000 milliLiter(s) (100 mL/Hr) IV Continuous <Continuous>  dextrose 50% Injectable 25 Gram(s) IV Push once  dextrose 50% Injectable 12.5 Gram(s) IV Push once  dextrose 50% Injectable 25 Gram(s) IV Push once  diltiazem    milliGRAM(s) Oral daily  enoxaparin Injectable 40 milliGRAM(s) SubCutaneous every 12 hours  gabapentin 400 milliGRAM(s) Oral three times a day  glucagon  Injectable 1 milliGRAM(s) IntraMuscular once  hydrALAZINE 50 milliGRAM(s) Oral every 8 hours  insulin lispro (ADMELOG) corrective regimen sliding scale   SubCutaneous three times a day before meals  latanoprost 0.005% Ophthalmic Solution 1 Drop(s) Both EYES at bedtime  losartan 50 milliGRAM(s) Oral every 12 hours  methylphenidate 20 milliGRAM(s) Oral <User Schedule>  mirtazapine 7.5 milliGRAM(s) Oral at bedtime  sodium chloride 0.9%. 1000 milliLiter(s) (60 mL/Hr) IV Continuous <Continuous>    MEDICATIONS  (PRN):  acetaminophen     Tablet .. 650 milliGRAM(s) Oral every 6 hours PRN Mild Pain (1 - 3)  aluminum hydroxide/magnesium hydroxide/simethicone Suspension 30 milliLiter(s) Oral every 4 hours PRN Dyspepsia  dextrose Oral Gel 15 Gram(s) Oral once PRN Blood Glucose LESS THAN 70 milliGRAM(s)/deciliter  ondansetron Injectable 4 milliGRAM(s) IV Push once PRN Nausea    CAPILLARY BLOOD GLUCOSE      POCT Blood Glucose.: 223 mg/dL (02 Dec 2022 11:40)  POCT Blood Glucose.: 132 mg/dL (02 Dec 2022 07:26)  POCT Blood Glucose.: 155 mg/dL (01 Dec 2022 21:06)  POCT Blood Glucose.: 98 mg/dL (01 Dec 2022 16:22)    I&O's Summary    01 Dec 2022 07:01  -  02 Dec 2022 07:00  --------------------------------------------------------  IN: 520 mL / OUT: 0 mL / NET: 520 mL    02 Dec 2022 07:01  -  02 Dec 2022 13:24  --------------------------------------------------------  IN: 200 mL / OUT: 300 mL / NET: -100 mL        PHYSICAL EXAM:  Vital Signs Last 24 Hrs  T(C): 36.7 (02 Dec 2022 12:54), Max: 36.8 (02 Dec 2022 01:04)  T(F): 98.1 (02 Dec 2022 12:54), Max: 98.3 (02 Dec 2022 01:04)  HR: 100 (02 Dec 2022 12:54) (93 - 113)  BP: 160/94 (02 Dec 2022 12:54) (124/71 - 160/94)  BP(mean): --  RR: 18 (02 Dec 2022 12:54) (18 - 20)  SpO2: 96% (02 Dec 2022 12:54) (96% - 97%)    Parameters below as of 02 Dec 2022 12:54  Patient On (Oxygen Delivery Method): room air    GENERAL: elderly man, NAD  HEAD:  Atraumatic, Normocephalic  EYES: EOMI, conjunctiva and sclera clear  NECK: Supple, No JVD  CHEST/LUNG: Clear to auscultation bilaterally; No wheeze  HEART: Regular rate and rhythm; No murmurs, rubs, or gallops  ABDOMEN: Soft, Nontender, Nondistended; Bowel sounds present  EXTREMITIES:  No clubbing, cyanosis, or edema  PSYCH: alert, did not talk when I asked questions.       LABS:                        12.6   10.89 )-----------( 249      ( 01 Dec 2022 10:06 )             39.3     12-    142  |  107  |  6<L>  ----------------------------<  184<H>  3.9   |  22  |  0.70    Ca    8.8      01 Dec 2022 10:06        Urinalysis Basic - ( 02 Dec 2022 10:43 )    Color: Colorless / Appearance: Slightly Turbid / S.023 / pH: x  Gluc: x / Ketone: Negative  / Bili: Negative / Urobili: Negative   Blood: x / Protein: Trace / Nitrite: Negative   Leuk Esterase: Large / RBC: x / WBC x   Sq Epi: x / Non Sq Epi: x / Bacteria: x        Culture - Blood (collected 2022 07:55)  Source: .Blood Blood-Peripheral  Preliminary Report (01 Dec 2022 13:01):    No growth to date.      RADIOLOGY & ADDITIONAL TESTS:    Lab Results Reviewed    Imaging Personally Reviewed:   CT venogram: Extensive left leg DVT is noted on earlier ultrasound extending cephalad   to include the left external iliac and common iliac vein. No extension to   the IVC.    COORDINATION OF CARE:  Care Discussed with Consultants/Other Providers:  vascular cardiology Dr. Echeverria re: AC

## 2022-12-02 NOTE — PROGRESS NOTE ADULT - PROBLEM SELECTOR PLAN 3
- R SDH evac in 9/2021 and more resent SDH s/p mechanical fall on 10/19 (s/p andexanet),  - presented w/increased lethargy  - admitted to NSGY service to rule out enlarging subdural hematoma. Repeat head CT's have been stable and show a low concern for subdural empyema. MRI does not show convincing diffusion restriction  - No plans for surgical intervention per NSGY   - EEG as above

## 2022-12-02 NOTE — PROGRESS NOTE ADULT - PROBLEM SELECTOR PLAN 2
- presented with increased lethargy, minimal verbal output  - h/o SDH x 2, repeat head CT's have been stable and show a low concern for subdural empyema; no surgical intervention per NSGY  - noted to have UTI, PNA at OSH ; s/p 10 day abx course  - EEG - Infrequent right frontal sharp waves, though in context of breach effect, Risk of seizures from the right frontal region.  - Neurology consulted to assist with above findings - no indication for AEDs at this time. Outpatient follow up with epilepsy neurologist recommended.   - seen by SLP for concern for aspiration ; MBS rec NPO. Discussed with spouse in detail all risks associated with these findings. Not interested in feeding tubes, wants to continue feeds by mouth and close ENT follow up outpatient.   - c/w soft and bite sized + mildly thick liquids  - aspiration precautions

## 2022-12-02 NOTE — CONSULT NOTE ADULT - TIME BILLING
Events that transpired leading to the patient's current hospitalization were reviewed.  The patient's hospital course was gone over.  Past medical history/surgical history/family history/social history was reviewed.  Patient due to his mental status was unable to provide a complete review of systems.  He moves his head but is not verbal.  ANO x0.  JVD is not elevated, supple, no lymphadenopathy.  Clear to auscultation bilateral with no wheezing rhonchi crackles.  Regular rate and rhythm with no murmur rub or gallop.  Positive bowel sound, soft, nontender, nondistended, no mass and guarding or rebound tenderness.  No clubbing, cyanosis or edema.  2+ DP/PT pulses.    72-year-old man with a past medical history segment for CAD, atrial fibrillation previously on apixaban, CVA, subdural hematoma in 9/2021 status post evacuation and 10/2022.  The patient had traumatic bleeds from falls while on Eliquis.  Approximately a week ago he was admitted to [facility] with generalized weakness, sepsis and aspiration pneumonia.  He was then transferred to [facility] for further evaluation by neurosurgery team.    Venous duplex study on 12/1/2022 demonstrated acute above-the-knee DVT extending from the calf veins through the external iliac in the left lower extremity.  CT scan of the abdomen and pelvis on 12/1/2022 demonstrated extensive left leg DVT noted on earlier ultrasound extending cephalad to include the left external iliac and common iliac vein.  No extension to the IVC.    The patient currently is hemodynamically stable.  He has chronic atrial fibrillation.  TTE from 10/20/2022 demonstrated normal left ventricular systolic function.  No secondary wall motion abnormalities.  Normal right atrium normal right ventricular size and function with no evidence of pulmonary hypertension.    Discussion was had with neurosurgery team/Dr. Whitehead.  Plan to increase Lovenox in stepwise fashion.  Today patient should be administered Lovenox 40 mg twice a day.  Tomorrow if he is clinically stable and doing well increase to Lovenox 50 mg twice a day.  The following day with the patient is doing well would uptitrate to Lovenox 60 mg twice a day.  We then continue the patient on Lovenox 60 mg twice a day.  Periodic CT head imaging as per neurosurgery team.  If the patient is unable to tolerate anticoagulation therapy would reevaluate for placement of IVC filter.    Findings and plan were discussed with neurosurgery team/Dr. Whitehead and vascular cardiology/Dr. Echeverria.

## 2022-12-03 LAB
ANION GAP SERPL CALC-SCNC: 10 MMOL/L — SIGNIFICANT CHANGE UP (ref 5–17)
BUN SERPL-MCNC: 6 MG/DL — LOW (ref 7–23)
CALCIUM SERPL-MCNC: 8.3 MG/DL — LOW (ref 8.4–10.5)
CHLORIDE SERPL-SCNC: 108 MMOL/L — SIGNIFICANT CHANGE UP (ref 96–108)
CO2 SERPL-SCNC: 22 MMOL/L — SIGNIFICANT CHANGE UP (ref 22–31)
CREAT SERPL-MCNC: 0.77 MG/DL — SIGNIFICANT CHANGE UP (ref 0.5–1.3)
EGFR: 96 ML/MIN/1.73M2 — SIGNIFICANT CHANGE UP
GLUCOSE BLDC GLUCOMTR-MCNC: 127 MG/DL — HIGH (ref 70–99)
GLUCOSE BLDC GLUCOMTR-MCNC: 136 MG/DL — HIGH (ref 70–99)
GLUCOSE BLDC GLUCOMTR-MCNC: 157 MG/DL — HIGH (ref 70–99)
GLUCOSE BLDC GLUCOMTR-MCNC: 198 MG/DL — HIGH (ref 70–99)
GLUCOSE SERPL-MCNC: 155 MG/DL — HIGH (ref 70–99)
HCT VFR BLD CALC: 37 % — LOW (ref 39–50)
HGB BLD-MCNC: 11.8 G/DL — LOW (ref 13–17)
MAGNESIUM SERPL-MCNC: 1.8 MG/DL — SIGNIFICANT CHANGE UP (ref 1.6–2.6)
MCHC RBC-ENTMCNC: 25.2 PG — LOW (ref 27–34)
MCHC RBC-ENTMCNC: 31.9 GM/DL — LOW (ref 32–36)
MCV RBC AUTO: 79.1 FL — LOW (ref 80–100)
NRBC # BLD: 0 /100 WBCS — SIGNIFICANT CHANGE UP (ref 0–0)
PHOSPHATE SERPL-MCNC: 3.1 MG/DL — SIGNIFICANT CHANGE UP (ref 2.5–4.5)
PLATELET # BLD AUTO: 210 K/UL — SIGNIFICANT CHANGE UP (ref 150–400)
POTASSIUM SERPL-MCNC: 3.5 MMOL/L — SIGNIFICANT CHANGE UP (ref 3.5–5.3)
POTASSIUM SERPL-SCNC: 3.5 MMOL/L — SIGNIFICANT CHANGE UP (ref 3.5–5.3)
RBC # BLD: 4.68 M/UL — SIGNIFICANT CHANGE UP (ref 4.2–5.8)
RBC # FLD: 16.6 % — HIGH (ref 10.3–14.5)
SODIUM SERPL-SCNC: 140 MMOL/L — SIGNIFICANT CHANGE UP (ref 135–145)
WBC # BLD: 9.41 K/UL — SIGNIFICANT CHANGE UP (ref 3.8–10.5)
WBC # FLD AUTO: 9.41 K/UL — SIGNIFICANT CHANGE UP (ref 3.8–10.5)

## 2022-12-03 PROCEDURE — 70450 CT HEAD/BRAIN W/O DYE: CPT | Mod: 26

## 2022-12-03 PROCEDURE — 99233 SBSQ HOSP IP/OBS HIGH 50: CPT

## 2022-12-03 RX ORDER — CEFTRIAXONE 500 MG/1
1000 INJECTION, POWDER, FOR SOLUTION INTRAMUSCULAR; INTRAVENOUS EVERY 24 HOURS
Refills: 0 | Status: DISCONTINUED | OUTPATIENT
Start: 2022-12-04 | End: 2022-12-06

## 2022-12-03 RX ORDER — CEFTRIAXONE 500 MG/1
1000 INJECTION, POWDER, FOR SOLUTION INTRAMUSCULAR; INTRAVENOUS ONCE
Refills: 0 | Status: COMPLETED | OUTPATIENT
Start: 2022-12-03 | End: 2022-12-03

## 2022-12-03 RX ORDER — CEFTRIAXONE 500 MG/1
INJECTION, POWDER, FOR SOLUTION INTRAMUSCULAR; INTRAVENOUS
Refills: 0 | Status: DISCONTINUED | OUTPATIENT
Start: 2022-12-03 | End: 2022-12-06

## 2022-12-03 RX ADMIN — Medication 2: at 17:15

## 2022-12-03 RX ADMIN — ENOXAPARIN SODIUM 50 MILLIGRAM(S): 100 INJECTION SUBCUTANEOUS at 17:18

## 2022-12-03 RX ADMIN — Medication 1000 UNIT(S): at 12:14

## 2022-12-03 RX ADMIN — GABAPENTIN 400 MILLIGRAM(S): 400 CAPSULE ORAL at 14:43

## 2022-12-03 RX ADMIN — ENOXAPARIN SODIUM 40 MILLIGRAM(S): 100 INJECTION SUBCUTANEOUS at 05:26

## 2022-12-03 RX ADMIN — Medication 50 MILLIGRAM(S): at 12:13

## 2022-12-03 RX ADMIN — CEFTRIAXONE 100 MILLIGRAM(S): 500 INJECTION, POWDER, FOR SOLUTION INTRAMUSCULAR; INTRAVENOUS at 12:11

## 2022-12-03 RX ADMIN — LATANOPROST 1 DROP(S): 0.05 SOLUTION/ DROPS OPHTHALMIC; TOPICAL at 21:01

## 2022-12-03 RX ADMIN — Medication 20 MILLIGRAM(S): at 08:51

## 2022-12-03 NOTE — PROGRESS NOTE ADULT - PROBLEM SELECTOR PLAN 1
Proximal LLE DVT.   Consulted Dr. Echeverria - per discussion with NSx, starting lovenox 40mg sc BID x 1 day, then 50mg BID x 1 day, then 60mg BID after.   Monitor for any s/s of bleeding, neurological changes. May require CT head imaging to ensure SDH stability.   Plan of care discussed with the patient's spouse who is in agreement with the case as outlined.  No acute changes in mental status this morning

## 2022-12-03 NOTE — PROGRESS NOTE ADULT - ASSESSMENT
72M with a past medical history of  CAD, HTN, HLD, Afib (Eliquis held since SDH), R SDH evac in 9/2021 and more resent SDH s/p mechanical fall on 10/19 (s/p andexanet), initially presented to [facility]  for increased lethargy. Pt was treated for UTI, PNA. Admitted to NSGY service to rule out enlarging subdural hematoma ; given stable head CTs  there are no plans for surgical intervention per NSGY ; pt is for transfer to the Medicine service.

## 2022-12-03 NOTE — PROGRESS NOTE ADULT - SUBJECTIVE AND OBJECTIVE BOX
Mira Ruth MD  Division of Hospital Medicine  Please contact via MS Teams (prefer message first)  Office: [phone]    Patient is a 72y old  Male who presents with a chief complaint of Infectious workup (02 Dec 2022 13:23)      SUBJECTIVE / OVERNIGHT EVENTS:  no acute events overnight, vss, afebrile  pt states that he is okay, not sure if he has any urinary symptoms  s/p failed MBS and s/s recommended NPO but both patient/wife wanted to have regular diet  pt himself denies problem with swallowing    ROS:  14 point ROS negative in detail except stated as above    MEDICATIONS  (STANDING):  atorvastatin 40 milliGRAM(s) Oral at bedtime  cefTRIAXone   IVPB      cholecalciferol 1000 Unit(s) Oral daily  dextrose 5%. 1000 milliLiter(s) (50 mL/Hr) IV Continuous <Continuous>  dextrose 5%. 1000 milliLiter(s) (100 mL/Hr) IV Continuous <Continuous>  dextrose 50% Injectable 25 Gram(s) IV Push once  dextrose 50% Injectable 12.5 Gram(s) IV Push once  dextrose 50% Injectable 25 Gram(s) IV Push once  diltiazem    milliGRAM(s) Oral daily  enoxaparin Injectable 40 milliGRAM(s) SubCutaneous every 12 hours  enoxaparin Injectable 50 milliGRAM(s) SubCutaneous every 12 hours  gabapentin 400 milliGRAM(s) Oral three times a day  glucagon  Injectable 1 milliGRAM(s) IntraMuscular once  hydrALAZINE 50 milliGRAM(s) Oral every 8 hours  insulin lispro (ADMELOG) corrective regimen sliding scale   SubCutaneous three times a day before meals  latanoprost 0.005% Ophthalmic Solution 1 Drop(s) Both EYES at bedtime  losartan 50 milliGRAM(s) Oral every 12 hours  methylphenidate 20 milliGRAM(s) Oral <User Schedule>  mirtazapine 7.5 milliGRAM(s) Oral at bedtime  sodium chloride 0.9%. 1000 milliLiter(s) (60 mL/Hr) IV Continuous <Continuous>    MEDICATIONS  (PRN):  acetaminophen     Tablet .. 650 milliGRAM(s) Oral every 6 hours PRN Mild Pain (1 - 3)  aluminum hydroxide/magnesium hydroxide/simethicone Suspension 30 milliLiter(s) Oral every 4 hours PRN Dyspepsia  dextrose Oral Gel 15 Gram(s) Oral once PRN Blood Glucose LESS THAN 70 milliGRAM(s)/deciliter  ondansetron Injectable 4 milliGRAM(s) IV Push once PRN Nausea      CAPILLARY BLOOD GLUCOSE      POCT Blood Glucose.: 136 mg/dL (03 Dec 2022 07:27)  POCT Blood Glucose.: 230 mg/dL (02 Dec 2022 21:08)  POCT Blood Glucose.: 128 mg/dL (02 Dec 2022 16:36)    I&O's Summary    02 Dec 2022 07:01  -  03 Dec 2022 07:00  --------------------------------------------------------  IN: 920 mL / OUT: 650 mL / NET: 270 mL        PHYSICAL EXAM:  Vital Signs Last 24 Hrs  T(C): 36.6 (03 Dec 2022 09:10), Max: 36.7 (02 Dec 2022 12:54)  T(F): 97.9 (03 Dec 2022 09:10), Max: 98.1 (02 Dec 2022 12:54)  HR: 100 (03 Dec 2022 09:10) (87 - 114)  BP: 167/118 (03 Dec 2022 09:10) (127/97 - 172/88)  BP(mean): --  RR: 18 (03 Dec 2022 09:10) (18 - 20)  SpO2: 98% (03 Dec 2022 09:10) (96% - 98%)    Parameters below as of 03 Dec 2022 09:10  Patient On (Oxygen Delivery Method): room air      GENERAL: NAD, well-developed  HEAD:  Atraumatic, Normocephalic  EYES: EOMI, PERRLA, conjunctiva and sclera clear  NECK: Supple, No JVD  CHEST/LUNG: Clear to auscultation bilaterally; No wheeze  HEART: Regular rate and rhythm; No murmurs, rubs, or gallops  ABDOMEN: Soft, Nontender, Nondistended; Bowel sounds present  EXTREMITIES:  2+ Peripheral Pulses, No clubbing, cyanosis, or edema  NEUROLOGY: AAOx1-2, left gaze, BLE with minimal movement, BUE contracted with edema  SKIN: No rashes or lesions    LABS:  personally reviewed                        11.8   9.41  )-----------( 210      ( 03 Dec 2022 10:08 )             37.0     12-03    140  |  108  |  6<L>  ----------------------------<  155<H>  3.5   |  22  |  0.77    Ca    8.3<L>      03 Dec 2022 10:08  Phos  3.1     12-03  Mg     1.8     12-03            Urinalysis Basic - ( 02 Dec 2022 10:43 )    Color: Colorless / Appearance: Slightly Turbid / S.023 / pH: x  Gluc: x / Ketone: Negative  / Bili: Negative / Urobili: Negative   Blood: x / Protein: Trace / Nitrite: Negative   Leuk Esterase: Large / RBC: 463 /hpf / WBC 17 /HPF   Sq Epi: x / Non Sq Epi: 0 /hpf / Bacteria: Many        RADIOLOGY & ADDITIONAL TESTS:    Imaging Personally Reviewed:    Consultant(s) Notes Reviewed:  NSG, vascular, s/s    Care Discussed with Consultants/Other Providers:

## 2022-12-03 NOTE — PROGRESS NOTE ADULT - ASSESSMENT
72 male with a past medical history of BPH, CAD, HTN/HLD, Afib (eliquis held since SDH), R SDH evac in 9/2021 and more resent SDH s/p mechanical fall on 10/19 (s/p andexanet), initially presented to [facility] for increased lethargy. Now transferred to [facility] for further management.   At [facility] hospital he had fever/leukocytosis, urosepsis, PNA. Most recent course of abx was completed today. CT's have been stable and show a low concern for subdural empyema given MRI does not show convincing diffusion restriction (shine through). Per wife, compared to baseline, he is more tired and requires more stimuli for verbal output.     -No acute neurosurgery intervention  -Per vascular cardiology, plan to slowly uptitrate lovenox to 60mg BID Sunday for DVTS  -CT today was stable  -Repeat CT Sunday  -Risk/Benefit AC v IVCF

## 2022-12-03 NOTE — PROGRESS NOTE ADULT - SUBJECTIVE AND OBJECTIVE BOX
Patient seen and examined at bedside.    --Anticoagulation--  enoxaparin Injectable 50 milliGRAM(s) SubCutaneous every 12 hours    T(C): 36.6 (12-03-22 @ 09:10), Max: 36.7 (12-02-22 @ 12:54)  HR: 100 (12-03-22 @ 09:10) (87 - 114)  BP: 167/118 (12-03-22 @ 09:10) (127/97 - 172/88)  RR: 18 (12-03-22 @ 09:10) (18 - 20)  SpO2: 98% (12-03-22 @ 09:10) (96% - 98%)  Wt(kg): --    Exam: AO1,EOS; more awake than on admission, L gaze preference, minimal verbal output but responds to questions, L droop, FC w/prompting, BUE spontaneous,  hand (L>R), BLE minimal spontaneous movement.

## 2022-12-04 LAB
ANION GAP SERPL CALC-SCNC: 11 MMOL/L — SIGNIFICANT CHANGE UP (ref 5–17)
BUN SERPL-MCNC: 7 MG/DL — SIGNIFICANT CHANGE UP (ref 7–23)
CALCIUM SERPL-MCNC: 8.9 MG/DL — SIGNIFICANT CHANGE UP (ref 8.4–10.5)
CHLORIDE SERPL-SCNC: 107 MMOL/L — SIGNIFICANT CHANGE UP (ref 96–108)
CO2 SERPL-SCNC: 25 MMOL/L — SIGNIFICANT CHANGE UP (ref 22–31)
CREAT SERPL-MCNC: 0.78 MG/DL — SIGNIFICANT CHANGE UP (ref 0.5–1.3)
EGFR: 96 ML/MIN/1.73M2 — SIGNIFICANT CHANGE UP
GLUCOSE BLDC GLUCOMTR-MCNC: 108 MG/DL — HIGH (ref 70–99)
GLUCOSE BLDC GLUCOMTR-MCNC: 114 MG/DL — HIGH (ref 70–99)
GLUCOSE BLDC GLUCOMTR-MCNC: 187 MG/DL — HIGH (ref 70–99)
GLUCOSE BLDC GLUCOMTR-MCNC: 200 MG/DL — HIGH (ref 70–99)
GLUCOSE SERPL-MCNC: 120 MG/DL — HIGH (ref 70–99)
HCT VFR BLD CALC: 37.7 % — LOW (ref 39–50)
HGB BLD-MCNC: 12.1 G/DL — LOW (ref 13–17)
MCHC RBC-ENTMCNC: 26 PG — LOW (ref 27–34)
MCHC RBC-ENTMCNC: 32.1 GM/DL — SIGNIFICANT CHANGE UP (ref 32–36)
MCV RBC AUTO: 81.1 FL — SIGNIFICANT CHANGE UP (ref 80–100)
NRBC # BLD: 0 /100 WBCS — SIGNIFICANT CHANGE UP (ref 0–0)
OB PNL STL: NEGATIVE — SIGNIFICANT CHANGE UP
PLATELET # BLD AUTO: 230 K/UL — SIGNIFICANT CHANGE UP (ref 150–400)
POTASSIUM SERPL-MCNC: 4 MMOL/L — SIGNIFICANT CHANGE UP (ref 3.5–5.3)
POTASSIUM SERPL-SCNC: 4 MMOL/L — SIGNIFICANT CHANGE UP (ref 3.5–5.3)
RBC # BLD: 4.65 M/UL — SIGNIFICANT CHANGE UP (ref 4.2–5.8)
RBC # FLD: 17 % — HIGH (ref 10.3–14.5)
SODIUM SERPL-SCNC: 143 MMOL/L — SIGNIFICANT CHANGE UP (ref 135–145)
WBC # BLD: 9 K/UL — SIGNIFICANT CHANGE UP (ref 3.8–10.5)
WBC # FLD AUTO: 9 K/UL — SIGNIFICANT CHANGE UP (ref 3.8–10.5)

## 2022-12-04 PROCEDURE — 99233 SBSQ HOSP IP/OBS HIGH 50: CPT

## 2022-12-04 PROCEDURE — 70450 CT HEAD/BRAIN W/O DYE: CPT | Mod: 26

## 2022-12-04 RX ADMIN — ENOXAPARIN SODIUM 60 MILLIGRAM(S): 100 INJECTION SUBCUTANEOUS at 05:45

## 2022-12-04 RX ADMIN — Medication 50 MILLIGRAM(S): at 13:08

## 2022-12-04 RX ADMIN — LOSARTAN POTASSIUM 50 MILLIGRAM(S): 100 TABLET, FILM COATED ORAL at 13:07

## 2022-12-04 RX ADMIN — Medication 2: at 17:24

## 2022-12-04 RX ADMIN — CEFTRIAXONE 100 MILLIGRAM(S): 500 INJECTION, POWDER, FOR SOLUTION INTRAMUSCULAR; INTRAVENOUS at 11:28

## 2022-12-04 RX ADMIN — ENOXAPARIN SODIUM 60 MILLIGRAM(S): 100 INJECTION SUBCUTANEOUS at 17:25

## 2022-12-04 RX ADMIN — Medication 120 MILLIGRAM(S): at 13:07

## 2022-12-04 RX ADMIN — LATANOPROST 1 DROP(S): 0.05 SOLUTION/ DROPS OPHTHALMIC; TOPICAL at 21:46

## 2022-12-04 NOTE — PROGRESS NOTE ADULT - PROBLEM SELECTOR PLAN 1
Proximal LLE DVT.   Consulted Dr. Echeverria - per discussion with NSx, s/p titration slowly with lovenox 40mg sc BID x 1 day, then 50mg BID x 1 day  Now on 60mg BID as of 12/4  Monitor for any s/s of bleeding, neurological changes.   Repeat CT head imaging on Monday to ensure SDH stability.   Plan of care discussed with the patient's spouse who is in agreement with the case as outlined.  No acute changes in mental status this morning

## 2022-12-04 NOTE — DIETITIAN NUTRITION RISK NOTIFICATION - TREATMENT: THE FOLLOWING DIET HAS BEEN RECOMMENDED
Diet, Soft and Bite Sized:   Consistent Carbohydrate {Evening Snack} (CSTCHOSN)  Mildly Thick Liquids (MILDTHICKLIQS) (11-30-22 @ 13:56) [Active]

## 2022-12-04 NOTE — DIETITIAN INITIAL EVALUATION ADULT - ORAL INTAKE PTA/DIET HISTORY
Pt seen asleep in bed, unable to participate in nutritional assessment. No specific diet hx noted. Baseline tolerance to chewing/swallowing unknown; baseline provision of energy/ nutrient intake unclear. Per previous RD notes, recent SLP evaluation (10/20/22) with recommendation for soft and bite sized diet with thin liquids. Wife assists with feedings PTA.

## 2022-12-04 NOTE — DIETITIAN INITIAL EVALUATION ADULT - REASON FOR ADMISSION
Per chart, "72 male with a past medical history of BPH, CAD, HTN/HLD, Afib (eliquis held since SDH), R SDH evac in 9/2021 and more resent SDH s/p mechanical fall on 10/19 (s/p andexanet), initially presented to [facility] for increased lethargy. Now transferred to [facility] for further management."

## 2022-12-04 NOTE — DIETITIAN INITIAL EVALUATION ADULT - REASON
Nutrition Focused Physical Exam deferred at this time, pt asleep during RD visit, unable to provide consent.

## 2022-12-04 NOTE — DIETITIAN INITIAL EVALUATION ADULT - ADD RECOMMEND
1) New malnutrition notification sent.   2) Continue current diet order as tolerated. Defer textures and consistencies to team/SLP.  3) Recommend mildly thickened Glucerna 2x/day to optimize PO intake.   4) Encourage adequate intake of meals and supplements to optimize PO intake.   5) Monitor PO intake/tolerance, weights, labs, hydration status, bowels, and skin integrity.

## 2022-12-04 NOTE — DIETITIAN INITIAL EVALUATION ADULT - PERTINENT LABORATORY DATA
12-04    143  |  107  |  7   ----------------------------<  120<H>  4.0   |  25  |  0.78    Ca    8.9      04 Dec 2022 05:50  Phos  3.1     12-03  Mg     1.8     12-03    POCT Blood Glucose.: 108 mg/dL (12-04-22 @ 11:49)  A1C with Estimated Average Glucose Result: 9.3 % (11-23-22 @ 05:29)  A1C with Estimated Average Glucose Result: 7.8 % (10-20-22 @ 02:22)  A1C with Estimated Average Glucose Result: 6.9 % (06-15-22 @ 10:52)

## 2022-12-04 NOTE — DIETITIAN INITIAL EVALUATION ADULT - ENERGY INTAKE
RN reports poor appetite and PO intake. Pt requires full feeding assistance. Of note, RN reports pt had some difficulty chewing/swallowing this morning.     --> Pt seen by SLP on 11/30 with recommendation for soft and bite sized diet with mildly thickened liquids.   --> Seen again by SLP with recommendations for NPO, with non-oral nutrition/hydration/medications.  --> Seen by SLP again, with NPO recommendations, however per SLP note, " spouse established wishes to continue Soft-bite-sized and mildly thick liquids" ">Maintain strict aspiration precautions which may reduce but not eliminate aspiration risk (upright, alert, small bites/sips, ensuring clear oral cavity after meals/meds)"    Nutrition-related concerns:   - Pt ordered for Remeron.  - Ordered for Vitamin D3.   - Hx of DM, HgbA1c 9.3%. No medications PTA as per chart.

## 2022-12-04 NOTE — DIETITIAN INITIAL EVALUATION ADULT - PERTINENT MEDS FT
MEDICATIONS  (STANDING):  atorvastatin 40 milliGRAM(s) Oral at bedtime  cefTRIAXone   IVPB      cefTRIAXone   IVPB 1000 milliGRAM(s) IV Intermittent every 24 hours  cholecalciferol 1000 Unit(s) Oral daily  dextrose 5%. 1000 milliLiter(s) (50 mL/Hr) IV Continuous <Continuous>  dextrose 5%. 1000 milliLiter(s) (100 mL/Hr) IV Continuous <Continuous>  dextrose 50% Injectable 25 Gram(s) IV Push once  dextrose 50% Injectable 12.5 Gram(s) IV Push once  dextrose 50% Injectable 25 Gram(s) IV Push once  diltiazem    milliGRAM(s) Oral daily  enoxaparin Injectable 60 milliGRAM(s) SubCutaneous every 12 hours  gabapentin 400 milliGRAM(s) Oral three times a day  glucagon  Injectable 1 milliGRAM(s) IntraMuscular once  hydrALAZINE 50 milliGRAM(s) Oral every 8 hours  insulin lispro (ADMELOG) corrective regimen sliding scale   SubCutaneous three times a day before meals  latanoprost 0.005% Ophthalmic Solution 1 Drop(s) Both EYES at bedtime  losartan 50 milliGRAM(s) Oral every 12 hours  methylphenidate 20 milliGRAM(s) Oral <User Schedule>  mirtazapine 7.5 milliGRAM(s) Oral at bedtime  sodium chloride 0.9%. 1000 milliLiter(s) (60 mL/Hr) IV Continuous <Continuous>    MEDICATIONS  (PRN):  acetaminophen     Tablet .. 650 milliGRAM(s) Oral every 6 hours PRN Mild Pain (1 - 3)  aluminum hydroxide/magnesium hydroxide/simethicone Suspension 30 milliLiter(s) Oral every 4 hours PRN Dyspepsia  dextrose Oral Gel 15 Gram(s) Oral once PRN Blood Glucose LESS THAN 70 milliGRAM(s)/deciliter  ondansetron Injectable 4 milliGRAM(s) IV Push once PRN Nausea

## 2022-12-04 NOTE — PROGRESS NOTE ADULT - ASSESSMENT
72M with a past medical history of  CAD, HTN, HLD, Afib (Eliquis held since SDH), R SDH evac in 9/2021 and more resent SDH s/p mechanical fall on 10/19 (s/p andexanet), initially presented to [facility]  for increased lethargy. Pt was treated for UTI, PNA. Admitted to NSGY service to rule out enlarging subdural hematoma ; given stable head CTs  there are no plans for surgical intervention per NSGY ; pt is for transfer to the Medicine service.

## 2022-12-04 NOTE — DIETITIAN INITIAL EVALUATION ADULT - REASON INDICATOR FOR ASSESSMENT
Nutrition consult warranted for: decreased PO intake   Information obtained from: electronic medical record and patient  Chart reviewed, events noted.

## 2022-12-04 NOTE — PROGRESS NOTE ADULT - ASSESSMENT
72 male with a past medical history of BPH, CAD, HTN/HLD, Afib (eliquis held since SDH), R SDH evac in 9/2021 and more resent SDH s/p mechanical fall on 10/19 (s/p andexanet), initially presented to [facility] for increased lethargy. Now transferred to [facility] for further management.   At [facility] hospital he had fever/leukocytosis, urosepsis, PNA. Most recent course of abx was completed today. CT's have been stable and show a low concern for subdural empyema given MRI does not show convincing diffusion restriction (shine through). Per wife, compared to baseline, he is more tired and requires more stimuli for verbal output.     -No acute neurosurgery intervention  -Per vascular cardiology, plan to slowly uptitrate lovenox to 60mg BID Sunday for DVTS  -CT today was stable  -Repeat CT Sunday v Monday pending attnd decision  -Risk/Benefit AC v IVCF 72 male with a past medical history of BPH, CAD, HTN/HLD, Afib (eliquis held since SDH), R SDH evac in 9/2021 and more resent SDH s/p mechanical fall on 10/19 (s/p andexanet), initially presented to [facility] for increased lethargy. Now transferred to [facility] for further management.   At [facility] hospital he had fever/leukocytosis, urosepsis, PNA. Most recent course of abx was completed today. CT's have been stable and show a low concern for subdural empyema given MRI does not show convincing diffusion restriction (shine through). Per wife, compared to baseline, he is more tired and requires more stimuli for verbal output.     -No acute neurosurgery intervention  -Per vascular cardiology, plan to slowly uptitrate lovenox to 60mg BID Sunday for DVTS  -CT today was stable  -Repeat CT Monday   -Risk/Benefit AC v IVCF

## 2022-12-04 NOTE — DIETITIAN INITIAL EVALUATION ADULT - NSFNSGIIOFT_GEN_A_CORE
- RN denies nausea, vomiting, diarrhea, or constipation.   - Last BM: none documented thus far; not currently ordered for bowel regimen.

## 2022-12-04 NOTE — PROGRESS NOTE ADULT - SUBJECTIVE AND OBJECTIVE BOX
Mira Ruth MD  Division of Hospital Medicine  Please contact via MS Teams (prefer message first)  Office: [phone]    Patient is a 72y old  Male who presents with a chief complaint of Infectious workup (04 Dec 2022 08:42)      SUBJECTIVE / OVERNIGHT EVENTS:  no acute events overnight, vss, afebrile  s/p repeat CTH this morning  pt doesn't want to be engaged in conversation this morning, also refused all his meds  he was very talkative yesterday but today, he only nods yes/no to the questions    ROS:  14 point ROS negative in detail except stated as above    MEDICATIONS  (STANDING):  atorvastatin 40 milliGRAM(s) Oral at bedtime  cefTRIAXone   IVPB 1000 milliGRAM(s) IV Intermittent every 24 hours  cefTRIAXone   IVPB      cholecalciferol 1000 Unit(s) Oral daily  dextrose 5%. 1000 milliLiter(s) (100 mL/Hr) IV Continuous <Continuous>  dextrose 5%. 1000 milliLiter(s) (50 mL/Hr) IV Continuous <Continuous>  dextrose 50% Injectable 25 Gram(s) IV Push once  dextrose 50% Injectable 12.5 Gram(s) IV Push once  dextrose 50% Injectable 25 Gram(s) IV Push once  diltiazem    milliGRAM(s) Oral daily  enoxaparin Injectable 60 milliGRAM(s) SubCutaneous every 12 hours  gabapentin 400 milliGRAM(s) Oral three times a day  glucagon  Injectable 1 milliGRAM(s) IntraMuscular once  hydrALAZINE 50 milliGRAM(s) Oral every 8 hours  insulin lispro (ADMELOG) corrective regimen sliding scale   SubCutaneous three times a day before meals  latanoprost 0.005% Ophthalmic Solution 1 Drop(s) Both EYES at bedtime  losartan 50 milliGRAM(s) Oral every 12 hours  methylphenidate 20 milliGRAM(s) Oral <User Schedule>  mirtazapine 7.5 milliGRAM(s) Oral at bedtime  sodium chloride 0.9%. 1000 milliLiter(s) (60 mL/Hr) IV Continuous <Continuous>    MEDICATIONS  (PRN):  acetaminophen     Tablet .. 650 milliGRAM(s) Oral every 6 hours PRN Mild Pain (1 - 3)  aluminum hydroxide/magnesium hydroxide/simethicone Suspension 30 milliLiter(s) Oral every 4 hours PRN Dyspepsia  dextrose Oral Gel 15 Gram(s) Oral once PRN Blood Glucose LESS THAN 70 milliGRAM(s)/deciliter  ondansetron Injectable 4 milliGRAM(s) IV Push once PRN Nausea      CAPILLARY BLOOD GLUCOSE      POCT Blood Glucose.: 114 mg/dL (04 Dec 2022 07:31)  POCT Blood Glucose.: 157 mg/dL (03 Dec 2022 21:13)  POCT Blood Glucose.: 198 mg/dL (03 Dec 2022 16:30)  POCT Blood Glucose.: 127 mg/dL (03 Dec 2022 11:43)    I&O's Summary    03 Dec 2022 07:01  -  04 Dec 2022 07:00  --------------------------------------------------------  IN: 240 mL / OUT: 725 mL / NET: -485 mL    04 Dec 2022 07:01  -  04 Dec 2022 10:39  --------------------------------------------------------  IN: 60 mL / OUT: 0 mL / NET: 60 mL        PHYSICAL EXAM:  Vital Signs Last 24 Hrs  T(C): 37.1 (04 Dec 2022 08:40), Max: 37.1 (04 Dec 2022 08:40)  T(F): 98.7 (04 Dec 2022 08:40), Max: 98.7 (04 Dec 2022 08:40)  HR: 87 (04 Dec 2022 08:40) (81 - 109)  BP: 150/98 (04 Dec 2022 08:40) (141/89 - 163/93)  BP(mean): --  RR: 18 (04 Dec 2022 08:40) (18 - 18)  SpO2: 97% (04 Dec 2022 08:40) (97% - 99%)    Parameters below as of 04 Dec 2022 08:40  Patient On (Oxygen Delivery Method): room air      GENERAL: NAD, well-developed  HEAD:  Atraumatic, Normocephalic  EYES: EOMI, PERRLA, conjunctiva and sclera clear  NECK: Supple, No JVD  CHEST/LUNG: Clear to auscultation bilaterally; No wheeze  HEART: Regular rate and rhythm; No murmurs, rubs, or gallops  ABDOMEN: Soft, Nontender, Nondistended; Bowel sounds present  EXTREMITIES: diffuse edema in UEs  NEUROLOGY: unable to assess but nods yes/no to the questions  SKIN: No rashes or lesions    LABS:  personally reviewed                        12.1   9.00  )-----------( 230      ( 04 Dec 2022 05:48 )             37.7     12-    143  |  107  |  7   ----------------------------<  120<H>  4.0   |  25  |  0.78    Ca    8.9      04 Dec 2022 05:50  Phos  3.1       Mg     1.8                 Urinalysis Basic - ( 02 Dec 2022 10:43 )    Color: Colorless / Appearance: Slightly Turbid / S.023 / pH: x  Gluc: x / Ketone: Negative  / Bili: Negative / Urobili: Negative   Blood: x / Protein: Trace / Nitrite: Negative   Leuk Esterase: Large / RBC: 463 /hpf / WBC 17 /HPF   Sq Epi: x / Non Sq Epi: 0 /hpf / Bacteria: Many        RADIOLOGY & ADDITIONAL TESTS:    Imaging Personally Reviewed:  < from: CT Head No Cont (22 @ 08:45) >  IMPRESSION: No acute hemorrhage. Decreased size of the left frontal   temporal subacute to chronic subdural hematoma compared with 2022.   No change in left medial parietal chronic subdural hematoma.    < end of copied text >      Consultant(s) Notes Reviewed:  NSG    Care Discussed with Consultants/Other Providers:

## 2022-12-05 LAB
APPEARANCE UR: CLEAR — SIGNIFICANT CHANGE UP
BACTERIA # UR AUTO: NEGATIVE — SIGNIFICANT CHANGE UP
BILIRUB UR-MCNC: NEGATIVE — SIGNIFICANT CHANGE UP
COLOR SPEC: SIGNIFICANT CHANGE UP
CULTURE RESULTS: SIGNIFICANT CHANGE UP
DIFF PNL FLD: NEGATIVE — SIGNIFICANT CHANGE UP
EPI CELLS # UR: 1 /HPF — SIGNIFICANT CHANGE UP
GLUCOSE BLDC GLUCOMTR-MCNC: 132 MG/DL — HIGH (ref 70–99)
GLUCOSE UR QL: NEGATIVE — SIGNIFICANT CHANGE UP
HYALINE CASTS # UR AUTO: 0 /LPF — SIGNIFICANT CHANGE UP (ref 0–2)
KETONES UR-MCNC: NEGATIVE — SIGNIFICANT CHANGE UP
LEUKOCYTE ESTERASE UR-ACNC: NEGATIVE — SIGNIFICANT CHANGE UP
NITRITE UR-MCNC: NEGATIVE — SIGNIFICANT CHANGE UP
PH UR: 7 — SIGNIFICANT CHANGE UP (ref 5–8)
PROT UR-MCNC: ABNORMAL
RBC CASTS # UR COMP ASSIST: 1 /HPF — SIGNIFICANT CHANGE UP (ref 0–4)
SP GR SPEC: 1.01 — SIGNIFICANT CHANGE UP (ref 1.01–1.02)
SPECIMEN SOURCE: SIGNIFICANT CHANGE UP
UROBILINOGEN FLD QL: NEGATIVE — SIGNIFICANT CHANGE UP
WBC UR QL: 2 /HPF — SIGNIFICANT CHANGE UP (ref 0–5)

## 2022-12-05 PROCEDURE — 99233 SBSQ HOSP IP/OBS HIGH 50: CPT

## 2022-12-05 PROCEDURE — 70450 CT HEAD/BRAIN W/O DYE: CPT | Mod: 26

## 2022-12-05 RX ORDER — ENOXAPARIN SODIUM 100 MG/ML
90 INJECTION SUBCUTANEOUS EVERY 24 HOURS
Refills: 0 | Status: DISCONTINUED | OUTPATIENT
Start: 2022-12-05 | End: 2022-12-05

## 2022-12-05 RX ORDER — DILTIAZEM HCL 120 MG
30 CAPSULE, EXT RELEASE 24 HR ORAL EVERY 6 HOURS
Refills: 0 | Status: DISCONTINUED | OUTPATIENT
Start: 2022-12-06 | End: 2022-12-07

## 2022-12-05 RX ORDER — APIXABAN 2.5 MG/1
5 TABLET, FILM COATED ORAL EVERY 12 HOURS
Refills: 0 | Status: DISCONTINUED | OUTPATIENT
Start: 2022-12-05 | End: 2022-12-07

## 2022-12-05 RX ORDER — ENOXAPARIN SODIUM 100 MG/ML
90 INJECTION SUBCUTANEOUS
Qty: 1 | Refills: 0
Start: 2022-12-05 | End: 2023-01-03

## 2022-12-05 RX ADMIN — Medication 120 MILLIGRAM(S): at 15:03

## 2022-12-05 RX ADMIN — LOSARTAN POTASSIUM 50 MILLIGRAM(S): 100 TABLET, FILM COATED ORAL at 17:37

## 2022-12-05 RX ADMIN — CEFTRIAXONE 100 MILLIGRAM(S): 500 INJECTION, POWDER, FOR SOLUTION INTRAMUSCULAR; INTRAVENOUS at 13:25

## 2022-12-05 RX ADMIN — Medication 20 MILLIGRAM(S): at 09:14

## 2022-12-05 RX ADMIN — Medication 1000 UNIT(S): at 09:57

## 2022-12-05 RX ADMIN — GABAPENTIN 400 MILLIGRAM(S): 400 CAPSULE ORAL at 09:55

## 2022-12-05 RX ADMIN — APIXABAN 5 MILLIGRAM(S): 2.5 TABLET, FILM COATED ORAL at 17:37

## 2022-12-05 RX ADMIN — GABAPENTIN 400 MILLIGRAM(S): 400 CAPSULE ORAL at 17:38

## 2022-12-05 RX ADMIN — LATANOPROST 1 DROP(S): 0.05 SOLUTION/ DROPS OPHTHALMIC; TOPICAL at 23:17

## 2022-12-05 RX ADMIN — Medication 50 MILLIGRAM(S): at 09:57

## 2022-12-05 NOTE — PROGRESS NOTE ADULT - SUBJECTIVE AND OBJECTIVE BOX
[facility] Division of Hospital Medicine  Prosper Newell MD, MORE  I'm reachable on Microsoft Teams   Off hours:  [phone] ([facility] pager)    Patient is a 72y old  Male who presents with a chief complaint of Infectious workup (05 Dec 2022 09:26)      SUBJECTIVE / OVERNIGHT EVENTS:  No events overnight. The patient ate very well with his wife this morning. However frequently, recently he's been refusing medications. His wife is aware of this chronic issue of refusing meds and tried to time it correctly with feeds at home.     MEDICATIONS  (STANDING):  atorvastatin 40 milliGRAM(s) Oral at bedtime  cefTRIAXone   IVPB      cefTRIAXone   IVPB 1000 milliGRAM(s) IV Intermittent every 24 hours  cholecalciferol 1000 Unit(s) Oral daily  dextrose 5%. 1000 milliLiter(s) (50 mL/Hr) IV Continuous <Continuous>  dextrose 5%. 1000 milliLiter(s) (100 mL/Hr) IV Continuous <Continuous>  dextrose 50% Injectable 25 Gram(s) IV Push once  dextrose 50% Injectable 12.5 Gram(s) IV Push once  dextrose 50% Injectable 25 Gram(s) IV Push once  diltiazem    milliGRAM(s) Oral daily  enoxaparin Injectable 90 milliGRAM(s) SubCutaneous every 24 hours  gabapentin 400 milliGRAM(s) Oral three times a day  glucagon  Injectable 1 milliGRAM(s) IntraMuscular once  hydrALAZINE 50 milliGRAM(s) Oral every 8 hours  insulin lispro (ADMELOG) corrective regimen sliding scale   SubCutaneous three times a day before meals  latanoprost 0.005% Ophthalmic Solution 1 Drop(s) Both EYES at bedtime  losartan 50 milliGRAM(s) Oral every 12 hours  methylphenidate 20 milliGRAM(s) Oral <User Schedule>  mirtazapine 7.5 milliGRAM(s) Oral at bedtime  sodium chloride 0.9%. 1000 milliLiter(s) (60 mL/Hr) IV Continuous <Continuous>    MEDICATIONS  (PRN):  acetaminophen     Tablet .. 650 milliGRAM(s) Oral every 6 hours PRN Mild Pain (1 - 3)  aluminum hydroxide/magnesium hydroxide/simethicone Suspension 30 milliLiter(s) Oral every 4 hours PRN Dyspepsia  dextrose Oral Gel 15 Gram(s) Oral once PRN Blood Glucose LESS THAN 70 milliGRAM(s)/deciliter  ondansetron Injectable 4 milliGRAM(s) IV Push once PRN Nausea    CAPILLARY BLOOD GLUCOSE      POCT Blood Glucose.: 187 mg/dL (04 Dec 2022 20:58)  POCT Blood Glucose.: 200 mg/dL (04 Dec 2022 16:37)    I&O's Summary    04 Dec 2022 07:01  -  05 Dec 2022 07:00  --------------------------------------------------------  IN: 300 mL / OUT: 650 mL / NET: -350 mL    05 Dec 2022 07:01  -  05 Dec 2022 14:57  --------------------------------------------------------  IN: 630 mL / OUT: 0 mL / NET: 630 mL        PHYSICAL EXAM:  Vital Signs Last 24 Hrs  T(C): 36.6 (05 Dec 2022 13:24), Max: 36.6 (05 Dec 2022 13:24)  T(F): 97.8 (05 Dec 2022 13:24), Max: 97.8 (05 Dec 2022 13:24)  HR: 112 (05 Dec 2022 13:24) (92 - 112)  BP: 147/82 (05 Dec 2022 13:24) (134/102 - 159/102)  BP(mean): --  RR: 18 (05 Dec 2022 13:24) (18 - 18)  SpO2: 98% (05 Dec 2022 13:24) (97% - 98%)    Parameters below as of 05 Dec 2022 13:24  Patient On (Oxygen Delivery Method): room air      GENERAL: elderly man, NAD  HEAD:  Atraumatic, Normocephalic  EYES: EOMI, conjunctiva and sclera clear  NECK: Supple, No JVD  CHEST/LUNG: Clear to auscultation bilaterally; No wheeze  HEART: Regular rate and rhythm; No murmurs, rubs, or gallops  ABDOMEN: Soft, Nontender, Nondistended; Bowel sounds present  EXTREMITIES:  No clubbing, cyanosis, or edema  PSYCH: alert, did not talk when I asked questions.       LABS:                        12.1   9.00  )-----------( 230      ( 04 Dec 2022 05:48 )             37.7     12-04    143  |  107  |  7   ----------------------------<  120<H>  4.0   |  25  |  0.78    Ca    8.9      04 Dec 2022 05:50          RADIOLOGY & ADDITIONAL TESTS:    Lab Results Reviewed    Imaging Personally Reviewed:   CT head -stable SDH

## 2022-12-05 NOTE — PROGRESS NOTE ADULT - PROBLEM SELECTOR PLAN 9
DVT ppx : Lovenox (dosing as above)    PT reccs Home with Home PT for strengthening, bed mob, transfer, gait and balance training; Home PT  (likely dc home 10/6 when HHA is resumed)

## 2022-12-05 NOTE — PROGRESS NOTE ADULT - PROBLEM SELECTOR PLAN 1
Proximal LLE DVT.   Consulted vascular cardiology, with assistance from NSx increased lovenox to a therapeutic dose of 60mg BID, CT head imaging all stable SDH. Will clarify if 90mg daily is another resonable option in him.   The patient frequently refuses medications; patient spouse will come in today to learn the injection.  Monitor for any s/s of bleeding, neurological changes.   Plan of care discussed with the patient's spouse who is in agreement with the case as outlined.  No acute changes in mental status this morning Proximal LLE DVT.   Consulted vascular cardiology, with assistance from NSx increased Lovenox to a therapeutic dose of 60mg BID, CT head imaging all stable SDH. Given stability on full AC lovenox, can transition back to Eliquis 5mg PO BID for discharge to encourage compliance.   Of note, the patient frequently refuses medications.  Monitor for any s/s of bleeding, neurological changes.   Plan of care discussed with the patient's spouse who is in agreement with the case as outlined.  No acute changes in mental status this morning

## 2022-12-05 NOTE — PROGRESS NOTE ADULT - ASSESSMENT
72 male with a past medical history of BPH, CAD, HTN/HLD, Afib (eliquis held since SDH), R SDH evac in 9/2021 and more resent SDH s/p mechanical fall on 10/19 (s/p andexanet), initially presented to [facility] for increased lethargy. Now transferred to [facility] for further management.   At [facility] hospital he had fever/leukocytosis, urosepsis, PNA. Most recent course of abx was completed today. CT's have been stable and show a low concern for subdural empyema given MRI does not show convincing diffusion restriction (shine through). Per wife, compared to baseline, he is more tired and requires more stimuli for verbal output.     -No acute neurosurgery intervention  -Per vascular cardiology, plan to slowly uptitrate lovenox to 60mg BID Sunday for DVTS  -CT today was stable  -Repeat CT today Monday   -Risk/Benefit AC v IVCF

## 2022-12-05 NOTE — PROGRESS NOTE ADULT - ASSESSMENT
72M with a past medical history of  CAD, HTN, HLD, Afib (Eliquis held since SDH), R SDH evac in 9/2021 and more resent SDH s/p mechanical fall on 10/19 (s/p andexanet), initially presented to [facility]  for increased lethargy. Pt was treated for UTI, PNA. Admitted to NSGY service to rule out enlarging subdural hematoma ; given stable head CTs  there are no plans for surgical intervention per NSGY; pt transfered to the Medicine service.

## 2022-12-05 NOTE — PROGRESS NOTE ADULT - ATTENDING COMMENTS
No acute events reported overnight.  Patient is currently sitting at 60 degree angle.  Not responsive to questions.  Does shake his head to simple request.  Unable to obtain 14 point review of systems due to the patient's mental status.    He moves his head but is not verbal.  ANO x1.  JVD is not elevated, supple, no lymphadenopathy.  Clear to auscultation bilateral with no wheezing rhonchi crackles.  Regular rate and rhythm with no murmur rub or gallop.  Positive bowel sound, soft, nontender, nondistended, no mass and guarding or rebound tenderness.  No clubbing, cyanosis or edema.  2+ DP/PT pulses.  Telemetry demonstrates atrial fibrillation with heart rates in the 80s to 130s.    Assessment/plan  72-year-old man with a past medical history segment for CAD, atrial fibrillation previously on apixaban, CVA, subdural hematoma in 9/2021 status post evacuation and 10/2022.  The patient had traumatic bleeds from falls while on Eliquis.  Approximately a week ago he was admitted to [facility] with generalized weakness, sepsis and aspiration pneumonia.  He was then transferred to [facility] for further evaluation by neurosurgery team.  Venous duplex study on 12/1/2022 demonstrated acute above-the-knee DVT extending from the calf veins through the external iliac in the left lower extremity.  CT scan of the abdomen and pelvis on 12/1/2022 demonstrated extensive left leg DVT noted on earlier ultrasound extending cephalad to include the left external iliac and common iliac vein.  No extension to the IVC.    The patient currently is hemodynamically stable.  He has chronic atrial fibrillation.  TTE from 10/20/2022 demonstrated normal left ventricular systolic function.  No secondary wall motion abnormalities.  Normal right atrium normal right ventricular size and function with no evidence of pulmonary hypertension.    --Explained to the patient's wife what is a DVT.  The short and long-term complications of a DVT were gone over along with its medical management.  --Discussion was had with neurosurgery team/Dr. Whitehead.  Plan is to continue Lovenox 60 mg twice a day.  Recommend periodic CT head imaging as per neurosurgery team.  Repeat CT scan earlier today demonstrate no change since 12/4/2022.  Low-density subdural collections without acute hemorrhage.  The patient is tolerating Lovenox 60 mg twice a day safely with no active signs of sentinel bleeding it is recommended that he be transition to Eliquis 5 mg twice a day.  Indications for blood thinners were reviewed with the patient's wife.  --It is recommended that approximately 3 months that a repeat venous duplex study be performed.  Indications for repeat venous duplex study was gone over.  --If the patient is unable to tolerate anticoagulation therapy would reevaluate for placement of IVC filter.  --The patient's wife is frustrated because she feels it is very hard for her to manage all of his care independently.  Additionally, due to his lack of independence and need for continuous support when transferring to get imaging studies has become increasingly difficult.  -- Continue hydralazine 50 mg every 8 hours, losartan 50 mg every 12 hours and diltiazem 120 mg daily.  -- Continue atorvastatin 40 mg daily.  -- Continue telemetry monitoring.    All question concerns of the patients wife who was at his bedside were addressed.  Due to difficulty in transporting the patient it is recommended for the patient and his wife sees that he follow-up with his primary cardiologist for his DVT.     Attestation Statements:   35 minutes spent on total encounter; more than 50% of the visit was spent counseling and / or coordinating care by the attending physician.  The necessity of the time spent during the encounter on this date of service was due to: Education, assessment and coordination of care.    education, assessment and coordination of care.

## 2022-12-05 NOTE — PROGRESS NOTE ADULT - SUBJECTIVE AND OBJECTIVE BOX
Vascular Cardiology  Progress note  DIRECT SERVICE NUMBER: [phone]               CC:      INTERVAL HISTORY:  No AEON     Denies any chest pain, SOB, palpitations     Afib on tele, HR           Allergies    No Known Allergies    Intolerances    	    MEDICATIONS:  diltiazem    milliGRAM(s) Oral daily  enoxaparin Injectable 60 milliGRAM(s) SubCutaneous every 12 hours  hydrALAZINE 50 milliGRAM(s) Oral every 8 hours  losartan 50 milliGRAM(s) Oral every 12 hours    cefTRIAXone   IVPB      cefTRIAXone   IVPB 1000 milliGRAM(s) IV Intermittent every 24 hours      acetaminophen     Tablet .. 650 milliGRAM(s) Oral every 6 hours PRN  gabapentin 400 milliGRAM(s) Oral three times a day  methylphenidate 20 milliGRAM(s) Oral <User Schedule>  mirtazapine 7.5 milliGRAM(s) Oral at bedtime  ondansetron Injectable 4 milliGRAM(s) IV Push once PRN    aluminum hydroxide/magnesium hydroxide/simethicone Suspension 30 milliLiter(s) Oral every 4 hours PRN    atorvastatin 40 milliGRAM(s) Oral at bedtime  dextrose 50% Injectable 25 Gram(s) IV Push once  dextrose 50% Injectable 12.5 Gram(s) IV Push once  dextrose 50% Injectable 25 Gram(s) IV Push once  dextrose Oral Gel 15 Gram(s) Oral once PRN  glucagon  Injectable 1 milliGRAM(s) IntraMuscular once  insulin lispro (ADMELOG) corrective regimen sliding scale   SubCutaneous three times a day before meals    cholecalciferol 1000 Unit(s) Oral daily  dextrose 5%. 1000 milliLiter(s) IV Continuous <Continuous>  dextrose 5%. 1000 milliLiter(s) IV Continuous <Continuous>  latanoprost 0.005% Ophthalmic Solution 1 Drop(s) Both EYES at bedtime  sodium chloride 0.9%. 1000 milliLiter(s) IV Continuous <Continuous>      PAST MEDICAL & SURGICAL HISTORY:  TBI (traumatic brain injury)      HTN (hypertension)      Atrial fibrillation      Peripheral neuropathy      Major depression      HLD (hyperlipidemia)      CAD (coronary artery disease)      BPH (benign prostatic hyperplasia)      Pneumonia due to COVID-19 virus  June 2022      Hemorrhage in the brain          FAMILY HISTORY:      SOCIAL HISTORY:  unchanged    REVIEW OF SYSTEMS:  CONSTITUTIONAL: No fever, weight loss, or fatigue  EYES: No eye pain, visual disturbances, or discharge  ENMT:  No difficulty hearing, tinnitus, vertigo; No sinus or throat pain  NECK: No pain or stiffness  RESPIRATORY: No cough, wheezing, chills or hemoptysis; No Shortness of Breath  CARDIOVASCULAR: No chest pain, palpitations, passing out, dizziness, or leg swelling  GASTROINTESTINAL: No abdominal or epigastric pain. No nausea, vomiting, or hematemesis; No diarrhea or constipation. No melena or hematochezia.  GENITOURINARY: No dysuria, frequency, hematuria, or incontinence  NEUROLOGICAL: No headaches, memory loss, loss of strength, numbness, or tremors  SKIN: No itching, burning, rashes, or lesions   LYMPH Nodes: No enlarged glands  ENDOCRINE: No heat or cold intolerance; No hair loss  MUSCULOSKELETAL: No joint pain or swelling; No muscle, back, or extremity pain  PSYCHIATRIC: No depression, anxiety, mood swings, or difficulty sleeping  HEME/LYMPH: No easy bruising, or bleeding gums  ALLERY AND IMMUNOLOGIC: No hives or eczema	    [ x] All others negative	  [ ] Unable to obtain    PHYSICAL EXAM:  T(C): 36.3 (12-05-22 @ 05:30), Max: 36.8 (12-04-22 @ 13:05)  HR: 92 (12-05-22 @ 05:30) (82 - 105)  BP: 158/109 (12-05-22 @ 05:30) (134/102 - 163/102)  RR: 18 (12-05-22 @ 05:30) (18 - 18)  SpO2: 97% (12-05-22 @ 05:30) (96% - 98%)  Wt(kg): --  I&O's Summary    04 Dec 2022 07:01  -  05 Dec 2022 07:00  --------------------------------------------------------  IN: 300 mL / OUT: 650 mL / NET: -350 mL        Appearance: Normal	  Psychiatry: Alert   Skin: No rashes, No ecchymoses, No cyanosis	  Neurologic: Non-focal  Extremities: Normal range of motion, No clubbing, cyanosis.  Vascular:   Right DP:  Palpable             Left DP:  Palpable      LABS:	 	    CBC Full  -  ( 04 Dec 2022 05:48 )  WBC Count : 9.00 K/uL  Hemoglobin : 12.1 g/dL  Hematocrit : 37.7 %  Platelet Count - Automated : 230 K/uL  Mean Cell Volume : 81.1 fl  Mean Cell Hemoglobin : 26.0 pg  Mean Cell Hemoglobin Concentration : 32.1 gm/dL  Auto Neutrophil # : x  Auto Lymphocyte # : x  Auto Monocyte # : x  Auto Eosinophil # : x  Auto Basophil # : x  Auto Neutrophil % : x  Auto Lymphocyte % : x  Auto Monocyte % : x  Auto Eosinophil % : x  Auto Basophil % : x    12-04    143  |  107  |  7   ----------------------------<  120<H>  4.0   |  25  |  0.78  12-03    140  |  108  |  6<L>  ----------------------------<  155<H>  3.5   |  22  |  0.77    Ca    8.9      04 Dec 2022 05:50  Ca    8.3<L>      03 Dec 2022 10:08  Phos  3.1     12-03  Mg     1.8     12-03            Assessment:  71 yo M w/ PMH CAD, Afib previously on apixaban, CVA, SDH in 9/2021 s/p evac and 10/2022 from fall who presents with generalized weakness and sepsis found to have aspiration pneumonia now s/p treatment. Vascular consulted for LLE DVT.     1. LLE DVT   2. SDH            Plan:  #LLE DVT   Per CT read, "Extensive left leg DVT involving the tibioperoneal trunk and   extending up the length of the left leg to include the popliteal,   superficial femoral, common femoral, saphenofemoral junction, left   external iliac, and left common iliac vein. Thrombus does not extend to   the IVC"     Patient is not on O2, notable chronic Afib, low suspicion for PE at this time. Likely provoked given patient's immobility although per wife, patient's mother may have had hx of blood clots. Per NSGY, can trial blood thinners but will need serial CT head given his SDH.   -Continue Lovenox 60mg   -Will defer IVC filter at this time   -CT head serial imaging per neurosurgery         Thank you      Vascular Cardiology Service  DIRECT SERVICE NUMBER [phone]       Vascular Cardiology  Progress note  DIRECT SERVICE NUMBER: [phone]               CC:      INTERVAL HISTORY:  No AEON     Denies any chest pain, SOB, palpitations     Afib on tele, HR           Allergies    No Known Allergies    Intolerances    	    MEDICATIONS:  diltiazem    milliGRAM(s) Oral daily  enoxaparin Injectable 60 milliGRAM(s) SubCutaneous every 12 hours  hydrALAZINE 50 milliGRAM(s) Oral every 8 hours  losartan 50 milliGRAM(s) Oral every 12 hours    cefTRIAXone   IVPB      cefTRIAXone   IVPB 1000 milliGRAM(s) IV Intermittent every 24 hours      acetaminophen     Tablet .. 650 milliGRAM(s) Oral every 6 hours PRN  gabapentin 400 milliGRAM(s) Oral three times a day  methylphenidate 20 milliGRAM(s) Oral <User Schedule>  mirtazapine 7.5 milliGRAM(s) Oral at bedtime  ondansetron Injectable 4 milliGRAM(s) IV Push once PRN    aluminum hydroxide/magnesium hydroxide/simethicone Suspension 30 milliLiter(s) Oral every 4 hours PRN    atorvastatin 40 milliGRAM(s) Oral at bedtime  dextrose 50% Injectable 25 Gram(s) IV Push once  dextrose 50% Injectable 12.5 Gram(s) IV Push once  dextrose 50% Injectable 25 Gram(s) IV Push once  dextrose Oral Gel 15 Gram(s) Oral once PRN  glucagon  Injectable 1 milliGRAM(s) IntraMuscular once  insulin lispro (ADMELOG) corrective regimen sliding scale   SubCutaneous three times a day before meals    cholecalciferol 1000 Unit(s) Oral daily  dextrose 5%. 1000 milliLiter(s) IV Continuous <Continuous>  dextrose 5%. 1000 milliLiter(s) IV Continuous <Continuous>  latanoprost 0.005% Ophthalmic Solution 1 Drop(s) Both EYES at bedtime  sodium chloride 0.9%. 1000 milliLiter(s) IV Continuous <Continuous>      PAST MEDICAL & SURGICAL HISTORY:  TBI (traumatic brain injury)      HTN (hypertension)      Atrial fibrillation      Peripheral neuropathy      Major depression      HLD (hyperlipidemia)      CAD (coronary artery disease)      BPH (benign prostatic hyperplasia)      Pneumonia due to COVID-19 virus  June 2022      Hemorrhage in the brain          FAMILY HISTORY:      SOCIAL HISTORY:  unchanged    REVIEW OF SYSTEMS:  CONSTITUTIONAL: No fever, weight loss, or fatigue  EYES: No eye pain, visual disturbances, or discharge  ENMT:  No difficulty hearing, tinnitus, vertigo; No sinus or throat pain  NECK: No pain or stiffness  RESPIRATORY: No cough, wheezing, chills or hemoptysis; No Shortness of Breath  CARDIOVASCULAR: No chest pain, palpitations, passing out, dizziness, or leg swelling  GASTROINTESTINAL: No abdominal or epigastric pain. No nausea, vomiting, or hematemesis; No diarrhea or constipation. No melena or hematochezia.  GENITOURINARY: No dysuria, frequency, hematuria, or incontinence  NEUROLOGICAL: No headaches, memory loss, loss of strength, numbness, or tremors  SKIN: No itching, burning, rashes, or lesions   LYMPH Nodes: No enlarged glands  ENDOCRINE: No heat or cold intolerance; No hair loss  MUSCULOSKELETAL: No joint pain or swelling; No muscle, back, or extremity pain  PSYCHIATRIC: No depression, anxiety, mood swings, or difficulty sleeping  HEME/LYMPH: No easy bruising, or bleeding gums  ALLERY AND IMMUNOLOGIC: No hives or eczema	    [ x] All others negative	  [ ] Unable to obtain    PHYSICAL EXAM:  T(C): 36.3 (12-05-22 @ 05:30), Max: 36.8 (12-04-22 @ 13:05)  HR: 92 (12-05-22 @ 05:30) (82 - 105)  BP: 158/109 (12-05-22 @ 05:30) (134/102 - 163/102)  RR: 18 (12-05-22 @ 05:30) (18 - 18)  SpO2: 97% (12-05-22 @ 05:30) (96% - 98%)  Wt(kg): --  I&O's Summary    04 Dec 2022 07:01  -  05 Dec 2022 07:00  --------------------------------------------------------  IN: 300 mL / OUT: 650 mL / NET: -350 mL        Appearance: Normal	  Psychiatry: Alert   Skin: No rashes, No ecchymoses, No cyanosis	  Neurologic: Non-focal  Extremities: Normal range of motion, No clubbing, cyanosis.  Vascular:   Right DP:  Palpable             Left DP:  Palpable      LABS:	 	    CBC Full  -  ( 04 Dec 2022 05:48 )  WBC Count : 9.00 K/uL  Hemoglobin : 12.1 g/dL  Hematocrit : 37.7 %  Platelet Count - Automated : 230 K/uL  Mean Cell Volume : 81.1 fl  Mean Cell Hemoglobin : 26.0 pg  Mean Cell Hemoglobin Concentration : 32.1 gm/dL  Auto Neutrophil # : x  Auto Lymphocyte # : x  Auto Monocyte # : x  Auto Eosinophil # : x  Auto Basophil # : x  Auto Neutrophil % : x  Auto Lymphocyte % : x  Auto Monocyte % : x  Auto Eosinophil % : x  Auto Basophil % : x    12-04    143  |  107  |  7   ----------------------------<  120<H>  4.0   |  25  |  0.78  12-03    140  |  108  |  6<L>  ----------------------------<  155<H>  3.5   |  22  |  0.77    Ca    8.9      04 Dec 2022 05:50  Ca    8.3<L>      03 Dec 2022 10:08  Phos  3.1     12-03  Mg     1.8     12-03            Assessment:  71 yo M w/ PMH CAD, Afib previously on apixaban, CVA, SDH in 9/2021 s/p evac and 10/2022 from fall who presents with generalized weakness and sepsis found to have aspiration pneumonia now s/p treatment. Vascular consulted for LLE DVT.     1. LLE DVT   2. SDH            Plan:  #LLE DVT   Per CT read, "Extensive left leg DVT involving the tibioperoneal trunk and   extending up the length of the left leg to include the popliteal,   superficial femoral, common femoral, saphenofemoral junction, left   external iliac, and left common iliac vein. Thrombus does not extend to   the IVC"     Patient is not on O2, notable chronic Afib, low suspicion for PE at this time. Likely provoked given patient's immobility although per wife, patient's mother may have had hx of blood clots. Per NSGY, can trial blood thinners but will need serial CT head given his SDH.   -Continue Lovenox 60mg , transition to apixaban when appropriate by NSGY   -Will defer IVC filter at this time   -CT head serial imaging per neurosurgery         Thank you      Vascular Cardiology Service  DIRECT SERVICE NUMBER [phone]

## 2022-12-05 NOTE — PROGRESS NOTE ADULT - PROBLEM SELECTOR PLAN 2
- presented with increased lethargy, minimal verbal output  - h/o SDH x 2, repeat head CT's have been stable and show a low concern for subdural empyema; no surgical intervention per NSGY  - noted to have UTI, PNA at OSH ; s/p 10 day abx course  - EEG - Infrequent right frontal sharp waves, though in context of breach effect, Risk of seizures from the right frontal region.  - Neurology consulted to assist with above findings - no indication for AEDs at this time. Outpatient follow up with epilepsy neurologist recommended.   - seen by SLP for concern for aspiration; MBS rec NPO. Discussed with spouse in detail all risks associated with these findings. Not interested in feeding tubes, wants to continue feeds by mouth and close ENT follow up outpatient.   - c/w soft and bite sized + mildly thick liquids  - aspiration precautions

## 2022-12-06 ENCOUNTER — TRANSCRIPTION ENCOUNTER (OUTPATIENT)
Age: 72
End: 2022-12-06

## 2022-12-06 LAB
ANION GAP SERPL CALC-SCNC: 12 MMOL/L — SIGNIFICANT CHANGE UP (ref 5–17)
BUN SERPL-MCNC: 8 MG/DL — SIGNIFICANT CHANGE UP (ref 7–23)
CALCIUM SERPL-MCNC: 8.5 MG/DL — SIGNIFICANT CHANGE UP (ref 8.4–10.5)
CHLORIDE SERPL-SCNC: 108 MMOL/L — SIGNIFICANT CHANGE UP (ref 96–108)
CO2 SERPL-SCNC: 23 MMOL/L — SIGNIFICANT CHANGE UP (ref 22–31)
CREAT SERPL-MCNC: 0.83 MG/DL — SIGNIFICANT CHANGE UP (ref 0.5–1.3)
EGFR: 94 ML/MIN/1.73M2 — SIGNIFICANT CHANGE UP
GLUCOSE BLDC GLUCOMTR-MCNC: 120 MG/DL — HIGH (ref 70–99)
GLUCOSE BLDC GLUCOMTR-MCNC: 129 MG/DL — HIGH (ref 70–99)
GLUCOSE BLDC GLUCOMTR-MCNC: 181 MG/DL — HIGH (ref 70–99)
GLUCOSE BLDC GLUCOMTR-MCNC: 189 MG/DL — HIGH (ref 70–99)
GLUCOSE SERPL-MCNC: 134 MG/DL — HIGH (ref 70–99)
HCT VFR BLD CALC: 37.9 % — LOW (ref 39–50)
HGB BLD-MCNC: 11.9 G/DL — LOW (ref 13–17)
MCHC RBC-ENTMCNC: 25.5 PG — LOW (ref 27–34)
MCHC RBC-ENTMCNC: 31.4 GM/DL — LOW (ref 32–36)
MCV RBC AUTO: 81.3 FL — SIGNIFICANT CHANGE UP (ref 80–100)
NRBC # BLD: 0 /100 WBCS — SIGNIFICANT CHANGE UP (ref 0–0)
PLATELET # BLD AUTO: 194 K/UL — SIGNIFICANT CHANGE UP (ref 150–400)
POTASSIUM SERPL-MCNC: 3.6 MMOL/L — SIGNIFICANT CHANGE UP (ref 3.5–5.3)
POTASSIUM SERPL-SCNC: 3.6 MMOL/L — SIGNIFICANT CHANGE UP (ref 3.5–5.3)
RBC # BLD: 4.66 M/UL — SIGNIFICANT CHANGE UP (ref 4.2–5.8)
RBC # FLD: 17.7 % — HIGH (ref 10.3–14.5)
SODIUM SERPL-SCNC: 143 MMOL/L — SIGNIFICANT CHANGE UP (ref 135–145)
WBC # BLD: 8.82 K/UL — SIGNIFICANT CHANGE UP (ref 3.8–10.5)
WBC # FLD AUTO: 8.82 K/UL — SIGNIFICANT CHANGE UP (ref 3.8–10.5)

## 2022-12-06 PROCEDURE — 99232 SBSQ HOSP IP/OBS MODERATE 35: CPT

## 2022-12-06 PROCEDURE — 70450 CT HEAD/BRAIN W/O DYE: CPT | Mod: 26

## 2022-12-06 RX ORDER — GABAPENTIN 400 MG/1
1 CAPSULE ORAL
Qty: 0 | Refills: 0 | DISCHARGE
Start: 2022-12-06

## 2022-12-06 RX ORDER — GABAPENTIN 400 MG/1
1 CAPSULE ORAL
Qty: 0 | Refills: 0 | DISCHARGE

## 2022-12-06 RX ORDER — APIXABAN 2.5 MG/1
1 TABLET, FILM COATED ORAL
Qty: 60 | Refills: 0
Start: 2022-12-06 | End: 2023-01-04

## 2022-12-06 RX ORDER — METHYLPHENIDATE HCL 5 MG
20 TABLET ORAL
Refills: 0 | Status: DISCONTINUED | OUTPATIENT
Start: 2022-12-06 | End: 2022-12-07

## 2022-12-06 RX ADMIN — APIXABAN 5 MILLIGRAM(S): 2.5 TABLET, FILM COATED ORAL at 21:49

## 2022-12-06 RX ADMIN — APIXABAN 5 MILLIGRAM(S): 2.5 TABLET, FILM COATED ORAL at 08:52

## 2022-12-06 RX ADMIN — Medication 30 MILLIGRAM(S): at 08:52

## 2022-12-06 RX ADMIN — Medication 50 MILLIGRAM(S): at 13:21

## 2022-12-06 RX ADMIN — LOSARTAN POTASSIUM 50 MILLIGRAM(S): 100 TABLET, FILM COATED ORAL at 08:52

## 2022-12-06 RX ADMIN — Medication 30 MILLIGRAM(S): at 12:42

## 2022-12-06 RX ADMIN — LATANOPROST 1 DROP(S): 0.05 SOLUTION/ DROPS OPHTHALMIC; TOPICAL at 21:50

## 2022-12-06 RX ADMIN — Medication 50 MILLIGRAM(S): at 21:50

## 2022-12-06 RX ADMIN — Medication 30 MILLIGRAM(S): at 17:21

## 2022-12-06 RX ADMIN — Medication 20 MILLIGRAM(S): at 08:53

## 2022-12-06 RX ADMIN — LOSARTAN POTASSIUM 50 MILLIGRAM(S): 100 TABLET, FILM COATED ORAL at 21:50

## 2022-12-06 RX ADMIN — Medication 50 MILLIGRAM(S): at 08:00

## 2022-12-06 RX ADMIN — GABAPENTIN 400 MILLIGRAM(S): 400 CAPSULE ORAL at 08:52

## 2022-12-06 RX ADMIN — Medication 2: at 12:52

## 2022-12-06 RX ADMIN — Medication 1000 UNIT(S): at 12:42

## 2022-12-06 NOTE — PROGRESS NOTE ADULT - PROBLEM SELECTOR PLAN 9
DVT ppx : Eliquis    PT reccs Home with Home PT for strengthening, bed mob, transfer, gait and balance training; Home PT  (likely dc home when [facility] is set up)

## 2022-12-06 NOTE — PROGRESS NOTE ADULT - SUBJECTIVE AND OBJECTIVE BOX
[facility] Division of Hospital Medicine  Prosper Newell MD, MORE  I'm reachable on [employer] Teams   Off hours:  [phone] ([facility] pager)    Patient is a 72y old  Male who presents with a chief complaint of Infectious workup (06 Dec 2022 12:56)      SUBJECTIVE / OVERNIGHT EVENTS:  No events overnight. No acute complaints.     MEDICATIONS  (STANDING):  apixaban 5 milliGRAM(s) Oral every 12 hours  atorvastatin 40 milliGRAM(s) Oral at bedtime  cholecalciferol 1000 Unit(s) Oral daily  dextrose 5%. 1000 milliLiter(s) (50 mL/Hr) IV Continuous <Continuous>  dextrose 5%. 1000 milliLiter(s) (100 mL/Hr) IV Continuous <Continuous>  dextrose 50% Injectable 25 Gram(s) IV Push once  dextrose 50% Injectable 12.5 Gram(s) IV Push once  dextrose 50% Injectable 25 Gram(s) IV Push once  diltiazem    Tablet 30 milliGRAM(s) Oral every 6 hours  gabapentin 400 milliGRAM(s) Oral three times a day  glucagon  Injectable 1 milliGRAM(s) IntraMuscular once  hydrALAZINE 50 milliGRAM(s) Oral every 8 hours  insulin lispro (ADMELOG) corrective regimen sliding scale   SubCutaneous three times a day before meals  latanoprost 0.005% Ophthalmic Solution 1 Drop(s) Both EYES at bedtime  losartan 50 milliGRAM(s) Oral every 12 hours  mirtazapine 7.5 milliGRAM(s) Oral at bedtime  sodium chloride 0.9%. 1000 milliLiter(s) (60 mL/Hr) IV Continuous <Continuous>    MEDICATIONS  (PRN):  acetaminophen     Tablet .. 650 milliGRAM(s) Oral every 6 hours PRN Mild Pain (1 - 3)  aluminum hydroxide/magnesium hydroxide/simethicone Suspension 30 milliLiter(s) Oral every 4 hours PRN Dyspepsia  dextrose Oral Gel 15 Gram(s) Oral once PRN Blood Glucose LESS THAN 70 milliGRAM(s)/deciliter  ondansetron Injectable 4 milliGRAM(s) IV Push once PRN Nausea    CAPILLARY BLOOD GLUCOSE      POCT Blood Glucose.: 120 mg/dL (06 Dec 2022 16:33)  POCT Blood Glucose.: 181 mg/dL (06 Dec 2022 12:42)  POCT Blood Glucose.: 129 mg/dL (06 Dec 2022 07:28)    I&O's Summary    05 Dec 2022 07:01  -  06 Dec 2022 07:00  --------------------------------------------------------  IN: 1230 mL / OUT: 300 mL / NET: 930 mL    06 Dec 2022 07:01  -  06 Dec 2022 16:48  --------------------------------------------------------  IN: 630 mL / OUT: 0 mL / NET: 630 mL        PHYSICAL EXAM:  Vital Signs Last 24 Hrs  T(C): 37 (06 Dec 2022 13:31), Max: 37 (06 Dec 2022 13:31)  T(F): 98.6 (06 Dec 2022 13:31), Max: 98.6 (06 Dec 2022 13:31)  HR: 79 (06 Dec 2022 13:31) (79 - 97)  BP: 112/78 (06 Dec 2022 13:31) (112/78 - 134/92)  BP(mean): 95 (05 Dec 2022 22:20) (95 - 95)  RR: 18 (06 Dec 2022 13:31) (15 - 18)  SpO2: 98% (06 Dec 2022 13:31) (96% - 98%)    Parameters below as of 06 Dec 2022 13:31  Patient On (Oxygen Delivery Method): room air    GENERAL: elderly man, NAD  HEAD:  Atraumatic, Normocephalic  EYES: EOMI, conjunctiva and sclera clear  NECK: Supple, No JVD  CHEST/LUNG: Clear to auscultation bilaterally; No wheeze  HEART: Regular rate and rhythm; No murmurs, rubs, or gallops  ABDOMEN: Soft, Nontender, Nondistended; Bowel sounds present  EXTREMITIES:  No clubbing, cyanosis, or edema  PSYCH: alert, did not really talk, answered questions by nodding his head. Said "thank you" at the end      LABS:                        11.9   8.82  )-----------( 194      ( 06 Dec 2022 06:19 )             37.9     12-06    143  |  108  |  8   ----------------------------<  134<H>  3.6   |  23  |  0.83    Ca    8.5      06 Dec 2022 06:19            Urinalysis Basic - ( 05 Dec 2022 16:10 )    Color: Light Yellow / Appearance: Clear / S.012 / pH: x  Gluc: x / Ketone: Negative  / Bili: Negative / Urobili: Negative   Blood: x / Protein: Trace / Nitrite: Negative   Leuk Esterase: Negative / RBC: 1 /hpf / WBC 2 /HPF   Sq Epi: x / Non Sq Epi: 1 /hpf / Bacteria: Negative          RADIOLOGY & ADDITIONAL TESTS:    Lab Results Reviewed:     Imaging Personally Reviewed:   CY head - stable SDH    COORDINATION OF CARE:  Care Discussed with Consultants/Other Providers:  Neurosurgery re: repeat CT head

## 2022-12-06 NOTE — PROGRESS NOTE ADULT - ASSESSMENT
72M with a past medical history of  CAD, HTN, HLD, Afib (Eliquis held since SDH), R SDH evac in 9/2021 and more resent SDH s/p mechanical fall on 10/19 (s/p andexanet), initially presented to [facility]  for increased lethargy. Pt was treated for UTI, PNA. Admitted to NSGY service to rule out enlarging subdural hematoma ; given stable head CTs  there are no plans for surgical intervention per NSGY; pt transfered to the Medicine service.

## 2022-12-06 NOTE — CHART NOTE - NSCHARTNOTESELECT_GEN_ALL_CORE
Event Note
Neurosurgery/Event Note
Speech and Swallow
neurosurgery/Event Note
Neurology
eeg prelim
vte risk assessment/Event Note

## 2022-12-06 NOTE — DISCHARGE NOTE PROVIDER - NSRESEARCHGRANT_PROPHYLAXISRECOMFT_GEN_A_CORE
Physical Exam    Vital Signs: I have reviewed the initial vital signs.  Constitutional: well-nourished, appears stated age, no acute distress  Eyes: Conjunctiva pink, Sclera clear, PERRLA, EOMI.  Cardiovascular: S1 and S2, regular rate, regular rhythm, well-perfused extremities, radial pulses equal and 2+  Respiratory: unlabored respiratory effort, clear to auscultation bilaterally no wheezing, rales and rhonchi  Gastrointestinal: soft, non-tender abdomen, no pulsatile mass, normal bowl sounds  Musculoskeletal: supple neck, no lower extremity edema, no midline tenderness to palpation and percussion. + Right lower lumbar muscle spasming  Integumentary: warm, dry, no rash  Neurologic: awake, alert, cranial nerves II-XII grossly intact, extremities’ motor and sensory functions grossly intact  Psychiatric: appropriate mood, appropriate affect
IMPROVE-DD Application Not Available

## 2022-12-06 NOTE — DISCHARGE NOTE PROVIDER - HOSPITAL COURSE
72M with a past medical history of  CAD, HTN, HLD, Afib (Eliquis held since SDH), R SDH evac in 9/2021 and more resent SDH s/p mechanical fall on 10/19 (s/p andexanet), initially presented to [facility]  for increased lethargy. Pt was treated for UTI, PNA. Admitted to NSGY service to rule out enlarging subdural hematoma ; given stable head CTs  there are no plans for surgical intervention per NSGY; pt transfered to the Medicine service.    ·  Problem: Acute DVT (deep venous thrombosis).   ·  Plan: Proximal LLE DVT.   Consulted vascular cardiology, with assistance from NSx increased Lovenox to a therapeutic dose of 60mg BID, CT head imaging all stable SDH. Given stability on full AC lovenox, can transition back to Eliquis 5mg PO BID for discharge to encourage compliance.   Of note, the patient frequently refuses medications.  Monitor for any s/s of bleeding, neurological changes.   Plan of care discussed with the patient's spouse who is in agreement with the case as outlined.  No acute changes in mental status this morning.      ·  Problem: AMS (altered mental status).   ·  Plan: - presented with increased lethargy, minimal verbal output  - h/o SDH x 2, repeat head CT's have been stable and show a low concern for subdural empyema; no surgical intervention per NSGY  - noted to have UTI, PNA at OSH ; s/p 10 day abx course  - EEG - Infrequent right frontal sharp waves, though in context of breach effect, Risk of seizures from the right frontal region.  - Neurology consulted to assist with above findings - no indication for AEDs at this time. Outpatient follow up with epilepsy neurologist recommended.   - seen by SLP for concern for aspiration[place] [facility] [place] Discussed with spouse in detail all risks associated with these findings. Not interested in feeding tubes, wants to continue feeds by mouth and close ENT follow up outpatient.   - c/w soft and bite sized + mildly thick liquids  - aspiration precautions.    ·  Problem: Subdural hematoma.   ·  Plan: - R SDH evac in 9/2021 and more resent SDH s/p mechanical fall on 10/19 (s/p andexanet),  - presented w/increased lethargy  - admitted to NSGY service to rule out enlarging subdural hematoma. Repeat head CT's have been stable and show a low concern for subdural empyema. MRI does not show convincing diffusion restriction  - No plans for surgical intervention per NSGY   - EEG as above.    ·  Problem: Atrial fibrillation.   ·  Plan: - c/w Dilitazem CD 120mg po daily  - monitor HR.    ·  Problem: HTN (hypertension).   ·  Plan: continue home meds: Hydralazine 50mg po tid, Losartan 50mg po bid, Dilitiazem CD 120mg po daily  monitor vital signs.    ·  Problem: Type 2 diabetes mellitus.   ·  Plan: - Hb A1C 9.3  - not on any medications at this time ; was previously on Metformin ; last took it about 1 year ago  - c/w RHONDA  - FS QAC QHS  - carb- consistent diet  - per endocrine, resume Metformin 1000mg PO BID at home.    ·  Problem: Major depression.   ·  Plan: - restart Mirtazapine 7.5mg po qHS  - c/w Ritalin 20mg po qd at 8 AM.    ·  Problem: Peripheral neuropathy.   ·  Plan: - c/w Gabapentin 400mg po tid.    ·  Problem: Prophylactic measure.   ·  Plan: DVT ppx : Lovenox (dosing as above)    PT reccs Home with Home PT for strengthening, bed mob, transfer, gait and balance training; Home PT  (likely dc home 10/6 when HHA is resumed).   72M with a past medical history of  CAD, HTN, HLD, Afib (Eliquis held since SDH), R SDH evac in 9/2021 and more resent SDH s/p mechanical fall on 10/19 (s/p andexanet), initially presented to [facility]  for increased lethargy. Pt was treated for UTI, PNA. Admitted to NSGY service to rule out enlarging subdural hematoma ; given stable head CTs  there are no plans for surgical intervention per NSGY; pt transfered to the Medicine service.    ·  Problem: Acute DVT (deep venous thrombosis).   ·  Plan: Proximal LLE DVT.   Consulted vascular cardiology, with assistance from NSx increased Lovenox to a therapeutic dose of 60mg BID, CT head imaging all stable SDH. Given stability on full AC lovenox, can transition back to Eliquis 5mg PO BID for discharge to encourage compliance.   Of note, the patient frequently refuses medications.  Monitor for any s/s of bleeding, neurological changes.   Plan of care discussed with the patient's spouse who is in agreement with the case as outlined.  No acute changes in mental status this morning.      ·  Problem: AMS (altered mental status).   ·  Plan: - presented with increased lethargy, minimal verbal output  - h/o SDH x 2, repeat head CT's have been stable and show a low concern for subdural empyema; no surgical intervention per NSGY  - noted to have UTI, PNA at OSH ; s/p 10 day abx course  - EEG - Infrequent right frontal sharp waves, though in context of breach effect, Risk of seizures from the right frontal region.  - Neurology consulted to assist with above findings - no indication for AEDs at this time. Outpatient follow up with epilepsy neurologist recommended.   - seen by SLP for concern for aspiration[place] [facility] [place] Discussed with spouse in detail all risks associated with these findings. Not interested in feeding tubes, wants to continue feeds by mouth and close ENT follow up outpatient.   - c/w soft and bite sized + mildly thick liquids  - aspiration precautions.    ·  Problem: Subdural hematoma.   ·  Plan: - R SDH evac in 9/2021 and more resent SDH s/p mechanical fall on 10/19 (s/p andexanet),  - presented w/increased lethargy  - admitted to NSGY service to rule out enlarging subdural hematoma. Repeat head CT's have been stable and show a low concern for subdural empyema. MRI does not show convincing diffusion restriction  - No plans for surgical intervention per NSGY   - EEG as above.    ·  Problem: Atrial fibrillation.   ·  Plan: - c/w Dilitazem CD 120mg po daily  - monitor HR.    ·  Problem: HTN (hypertension).   ·  Plan: continue home meds: Hydralazine 50mg po tid, Losartan 50mg po bid, Dilitiazem CD 120mg po daily  monitor vital signs.    ·  Problem: Type 2 diabetes mellitus.   ·  Plan: - Hb A1C 9.3  - not on any medications at this time ; was previously on Metformin ; last took it about 1 year ago  - c/w RHONDA  - FS QAC QHS  - carb- consistent diet  - per endocrine, resume Metformin 1000mg PO BID at home.    ·  Problem: Major depression.   ·  Plan: - restart Mirtazapine 7.5mg po qHS  - c/w Ritalin 20mg po qd at 8 AM.    ·  Problem: Peripheral neuropathy.   ·  Plan: - c/w Gabapentin 400mg po tid.    ·  Problem: Prophylactic measure.   ·  Plan: DVT ppx : Lovenox (dosing as above)    PT reccs Home with Home PT for strengthening, bed mob, transfer, gait and balance training; Home PT  (likely dc home 10/6 when HHA is resumed).      Discharge Diagnoses:  Metabolic encephalopathy due to recent infection  Severe dysphagia  Acute Proximal LLE DVT  traumatic Subdural hematoma   chronic atrial fibrillation  Type 2 diabetes mellitus  MDD  Peripheral neuropathy

## 2022-12-06 NOTE — DISCHARGE NOTE PROVIDER - PROVIDER TOKENS
PROVIDER:[TOKEN:[[id number]],FOLLOWUP:[2 weeks]],PROVIDER:[TOKEN:[[id number]],FOLLOWUP:[2 weeks]]

## 2022-12-06 NOTE — DISCHARGE NOTE PROVIDER - DETAILS OF MALNUTRITION DIAGNOSIS/DIAGNOSES
This patient has been assessed with a concern for Malnutrition and was treated during this hospitalization for the following Nutrition diagnosis/diagnoses:     -  12/04/2022: Moderate protein-calorie malnutrition

## 2022-12-06 NOTE — DISCHARGE NOTE PROVIDER - NSDCMRMEDTOKEN_GEN_ALL_CORE_FT
apixaban 5 mg oral tablet: 1 tab(s) orally every 12 hours  Cardizem  mg/24 hours oral capsule, extended release: 1 cap(s) orally once a day   gabapentin 400 mg oral capsule: 1 cap(s) orally 3 times a day  hydrALAZINE 50 mg oral tablet: 50 milligram(s) orally 3 times a day  latanoprost 0.005% ophthalmic solution: 1 drop(s) to each affected eye once a day (in the evening)  losartan 50 mg oral tablet: 50 milligram(s) orally 2 times a day  Methylphenidate Hydrochloride CD 20 mg/24 hr oral capsule, extended release: 1 cap(s) orally once a day (in the morning)  mirtazapine 7.5 mg oral tablet: 1 tab(s) orally once a day (at bedtime)  rosuvastatin 10 mg oral tablet: 1 tab(s) orally once a day  Vitamin D3 25 mcg (1000 intl units) oral tablet: 1 tab(s) orally once a day   apixaban 5 mg oral tablet: 1 tab(s) orally every 12 hours  Cardizem  mg/24 hours oral capsule, extended release: 1 cap(s) orally once a day   gabapentin 400 mg oral capsule: 1 cap(s) orally 3 times a day  glucerna: 1 each  3 times a day (with meals)   hydrALAZINE 50 mg oral tablet: 50 milligram(s) orally 3 times a day  latanoprost 0.005% ophthalmic solution: 1 drop(s) to each affected eye once a day (in the evening)  losartan 50 mg oral tablet: 50 milligram(s) orally 2 times a day  Methylphenidate Hydrochloride CD 20 mg/24 hr oral capsule, extended release: 1 cap(s) orally once a day (in the morning)  mirtazapine 7.5 mg oral tablet: 1 tab(s) orally once a day (at bedtime)  rosuvastatin 10 mg oral tablet: 1 tab(s) orally once a day  Vitamin D3 25 mcg (1000 intl units) oral tablet: 1 tab(s) orally once a day   apixaban 5 mg oral tablet: 1 tab(s) orally every 12 hours  Cardizem  mg/24 hours oral capsule, extended release: 1 cap(s) orally once a day   gabapentin 400 mg oral capsule: 1 cap(s) orally 3 times a day  glucerna: 1 each  3 times a day (with meals)   hydrALAZINE 50 mg oral tablet: 50 milligram(s) orally 3 times a day  latanoprost 0.005% ophthalmic solution: 1 drop(s) to each affected eye once a day (in the evening)  losartan 50 mg oral tablet: 50 milligram(s) orally 2 times a day  metFORMIN 1000 mg oral tablet: 1 tab(s) orally 2 times a day   Methylphenidate Hydrochloride CD 20 mg/24 hr oral capsule, extended release: 1 cap(s) orally once a day (in the morning)  mirtazapine 7.5 mg oral tablet: 1 tab(s) orally once a day (at bedtime)  rosuvastatin 10 mg oral tablet: 1 tab(s) orally once a day  Vitamin D3 25 mcg (1000 intl units) oral tablet: 1 tab(s) orally once a day

## 2022-12-06 NOTE — PROGRESS NOTE ADULT - ASSESSMENT
72 male with a past medical history of BPH, CAD, HTN/HLD, Afib (eliquis held since SDH), R SDH evac in 9/2021 and more resent SDH s/p mechanical fall on 10/19 (s/p andexanet), initially presented to [facility] for increased lethargy. Now transferred to [facility] for further management.   At [facility] hospital he had fever/leukocytosis, urosepsis, PNA. Most recent course of abx was completed today. CT's have been stable and show a low concern for subdural empyema given MRI does not show convincing diffusion restriction (shine through). Per wife, compared to baseline, he is more tired and requires more stimuli for verbal output.     -No acute neurosurgery intervention  -Per vascular cardiology, lovenox to 60mg BID for DVTs with transition to eliquis  -CT yesterday was stable  -Repeat one last CTH tomorrow 12/7, if stable no further CTH required to determine AC safety   -Risk/Benefit AC v IVCF

## 2022-12-06 NOTE — DISCHARGE NOTE PROVIDER - CARE PROVIDER_API CALL
Kamron Thomas)  Cardiology; Internal Medicine; Interventional Cardiology  [address] [place] [address]  Phone: [phone]  Fax: [phone]  Follow Up Time: 2 weeks    Baldomero Cruz)  Geriatric Medicine; Internal Medicine  [address] [place] [address]  Phone: [phone]  Fax: [phone]  Follow Up Time: 2 weeks

## 2022-12-06 NOTE — DISCHARGE NOTE PROVIDER - NSDCCPCAREPLAN_GEN_ALL_CORE_FT
PRINCIPAL DISCHARGE DIAGNOSIS  Diagnosis: Subdural hematoma  Assessment and Plan of Treatment: - R SDH evac in 9/2021 and more resent SDH s/p mechanical fall on 10/19 (s/p andexanet),  - presented w/increased lethargy  - admitted to NSGY service to rule out enlarging subdural hematoma. Repeat head CT's have been stable and show a low concern for subdural empyema. MRI does not show convincing diffusion restriction  - No plans for surgical intervention per NSGY   - s/p EEG        SECONDARY DISCHARGE DIAGNOSES  Diagnosis: Atrial fibrillation  Assessment and Plan of Treatment: c/w Dilitazem CD 120mg po daily  Atrial fibrillation is the most common heart rhythm problem.  The condition puts you at risk for has stroke and heart attack  You were started on blood thinners to prevent possible clot and stroke complications.   Monitor for any signs of bleeding and avoid injury while on this medication  If any bleeding persistent and symptomatic stop the medication and notify your doctor immediately.  It helps if you control your blood pressure, not drink more than 1-2 alcohol drinks per day, cut down on caffeine, getting treatment for over active thyroid gland, and get regular exercise  Call your doctor if you feel your heart racing or beating unusually, chest tightness or pain, lightheaded, faint, shortness of breath especially with exercise  It is important to take your heart medication as prescribed  You may be on anticoagulation which is very important to take as directed - you may need blood work to monitor drug levels

## 2022-12-06 NOTE — PROGRESS NOTE ADULT - PROBLEM SELECTOR PLAN 1
Proximal LLE DVT.   Vascular cardiology consult appreciated - transitioned back to Eliquis 5mg PO BID (Of note, the patient frequently refuses medications)  CT head imaging stable SDH  Monitor for any s/s of bleeding, neurological changes  No acute changes in mental status this morning  Plan of care discussed with the patient's spouse who is in agreement with the case as outlined.    THANG working on transition home with HHA (looking for accepting agency now)

## 2022-12-06 NOTE — DISCHARGE NOTE PROVIDER - NSDCFUSCHEDAPPT_GEN_ALL_CORE_FT
Mirta Goldsmith  [facility]  CARDIOLOGY [address] [facility]  Scheduled Appointment: 02/13/2023    Richard Mathis  [facility]  INTMED [address]  Scheduled Appointment: 02/22/2023

## 2022-12-06 NOTE — PROGRESS NOTE ADULT - SUBJECTIVE AND OBJECTIVE BOX
Vascular Cardiology  Progress note  DIRECT SERVICE NUMBER: [phone]               CC:      INTERVAL HISTORY:  No AEON     Denies chest pain, SOB, palpitations          Allergies    No Known Allergies    Intolerances    	    MEDICATIONS:  apixaban 5 milliGRAM(s) Oral every 12 hours  diltiazem    Tablet 30 milliGRAM(s) Oral every 6 hours  hydrALAZINE 50 milliGRAM(s) Oral every 8 hours  losartan 50 milliGRAM(s) Oral every 12 hours    cefTRIAXone   IVPB      cefTRIAXone   IVPB 1000 milliGRAM(s) IV Intermittent every 24 hours      acetaminophen     Tablet .. 650 milliGRAM(s) Oral every 6 hours PRN  gabapentin 400 milliGRAM(s) Oral three times a day  mirtazapine 7.5 milliGRAM(s) Oral at bedtime  ondansetron Injectable 4 milliGRAM(s) IV Push once PRN    aluminum hydroxide/magnesium hydroxide/simethicone Suspension 30 milliLiter(s) Oral every 4 hours PRN    atorvastatin 40 milliGRAM(s) Oral at bedtime  dextrose 50% Injectable 25 Gram(s) IV Push once  dextrose 50% Injectable 12.5 Gram(s) IV Push once  dextrose 50% Injectable 25 Gram(s) IV Push once  dextrose Oral Gel 15 Gram(s) Oral once PRN  glucagon  Injectable 1 milliGRAM(s) IntraMuscular once  insulin lispro (ADMELOG) corrective regimen sliding scale   SubCutaneous three times a day before meals    cholecalciferol 1000 Unit(s) Oral daily  dextrose 5%. 1000 milliLiter(s) IV Continuous <Continuous>  dextrose 5%. 1000 milliLiter(s) IV Continuous <Continuous>  latanoprost 0.005% Ophthalmic Solution 1 Drop(s) Both EYES at bedtime  sodium chloride 0.9%. 1000 milliLiter(s) IV Continuous <Continuous>      PAST MEDICAL & SURGICAL HISTORY:  TBI (traumatic brain injury)      HTN (hypertension)      Atrial fibrillation      Peripheral neuropathy      Major depression      HLD (hyperlipidemia)      CAD (coronary artery disease)      BPH (benign prostatic hyperplasia)      Pneumonia due to COVID-19 virus  June 2022      Hemorrhage in the brain          FAMILY HISTORY:      SOCIAL HISTORY:  unchanged    REVIEW OF SYSTEMS:  CONSTITUTIONAL: No fever, weight loss, or fatigue  EYES: No eye pain, visual disturbances, or discharge  ENMT:  No difficulty hearing, tinnitus, vertigo; No sinus or throat pain  NECK: No pain or stiffness  RESPIRATORY: No cough, wheezing, chills or hemoptysis; No Shortness of Breath  CARDIOVASCULAR: No chest pain, palpitations, passing out, dizziness, or leg swelling  GASTROINTESTINAL: No abdominal or epigastric pain. No nausea, vomiting, or hematemesis; No diarrhea or constipation. No melena or hematochezia.  GENITOURINARY: No dysuria, frequency, hematuria, or incontinence  NEUROLOGICAL: No headaches, memory loss, loss of strength, numbness, or tremors  SKIN: No itching, burning, rashes, or lesions   LYMPH Nodes: No enlarged glands  ENDOCRINE: No heat or cold intolerance; No hair loss  MUSCULOSKELETAL: No joint pain or swelling; No muscle, back, or extremity pain  PSYCHIATRIC: No depression, anxiety, mood swings, or difficulty sleeping  HEME/LYMPH: No easy bruising, or bleeding gums  ALLERY AND IMMUNOLOGIC: No hives or eczema	    [ x] All others negative	  [ ] Unable to obtain    PHYSICAL EXAM:  T(C): 36.8 (12-06-22 @ 04:42), Max: 36.8 (12-06-22 @ 04:42)  HR: 89 (12-06-22 @ 04:42) (80 - 112)  BP: 127/89 (12-06-22 @ 04:42) (118/83 - 147/82)  RR: 18 (12-06-22 @ 04:42) (15 - 18)  SpO2: 96% (12-06-22 @ 04:42) (96% - 98%)  Wt(kg): --  I&O's Summary    05 Dec 2022 07:01  -  06 Dec 2022 07:00  --------------------------------------------------------  IN: 1230 mL / OUT: 300 mL / NET: 930 mL        Appearance: Normal	  Psychiatry: Alert   Skin: No rashes, No ecchymoses, No cyanosis	  Neurologic: Non-focal  Extremities: Normal range of motion, No clubbing, cyanosis.  Vascular:   Right DP:  Palpable             Left DP:  Palpable      LABS:	 	    CBC Full  -  ( 06 Dec 2022 06:19 )  WBC Count : 8.82 K/uL  Hemoglobin : 11.9 g/dL  Hematocrit : 37.9 %  Platelet Count - Automated : 194 K/uL  Mean Cell Volume : 81.3 fl  Mean Cell Hemoglobin : 25.5 pg  Mean Cell Hemoglobin Concentration : 31.4 gm/dL  Auto Neutrophil # : x  Auto Lymphocyte # : x  Auto Monocyte # : x  Auto Eosinophil # : x  Auto Basophil # : x  Auto Neutrophil % : x  Auto Lymphocyte % : x  Auto Monocyte % : x  Auto Eosinophil % : x  Auto Basophil % : x    12-06    143  |  108  |  8   ----------------------------<  134<H>  3.6   |  23  |  0.83    Ca    8.5      06 Dec 2022 06:19            Assessment:  71 yo M w/ PMH CAD, Afib previously on apixaban, CVA, SDH in 9/2021 s/p evac and 10/2022 from fall who presents with generalized weakness and sepsis found to have aspiration pneumonia now s/p treatment. Vascular consulted for LLE DVT.     1. LLE DVT   2. SDH            Plan:  #LLE DVT   Per CT read, "Extensive left leg DVT involving the tibioperoneal trunk and   extending up the length of the left leg to include the popliteal,   superficial femoral, common femoral, saphenofemoral junction, left   external iliac, and left common iliac vein. Thrombus does not extend to   the IVC"     Patient is not on O2, notable chronic Afib, low suspicion for PE at this time. Likely provoked given patient's immobility although per wife, patient's mother may have had hx of blood clots. Per NSGY, can trial blood thinners but will need serial CT head given his SDH.   -Can continue apixaban 5mg BID   -Will defer IVC filter at this time   -CT head serial imaging per neurosurgery  -Will need repeat duplex US in 3 months outpatient          Thank you      Vascular Cardiology Service  DIRECT SERVICE NUMBER [phone]

## 2022-12-06 NOTE — DISCHARGE NOTE PROVIDER - NSDCFUADDAPPT_GEN_ALL_CORE_FT
APPTS ARE READY TO BE MADE: [x ] YES    Best Family or Patient Contact (if needed): Wife- Love     Additional Information about above appointments (if needed):    1: Please followup with vascular cardiologist Dr Thomas  2: Please followup with geriatric MD Dr Cruz  3:     Other comments or requests:    APPTS ARE READY TO BE MADE: [x ] YES    Best Family or Patient Contact (if needed): WifeSara Aleman     Additional Information about above appointments (if needed):    1: Please followup with vascular cardiologist Dr Thomas  2: Please followup with geriatric MD Dr Cruz  3:     Other comments or requests:       Patient was provided with Dr. Thomas and Dr. Cruz and was advised to call to schedule follow up within specified time frame. At this time patient declined scheduling assistance.

## 2022-12-07 ENCOUNTER — TRANSCRIPTION ENCOUNTER (OUTPATIENT)
Age: 72
End: 2022-12-07

## 2022-12-07 VITALS
OXYGEN SATURATION: 97 % | DIASTOLIC BLOOD PRESSURE: 99 MMHG | RESPIRATION RATE: 18 BRPM | HEART RATE: 86 BPM | SYSTOLIC BLOOD PRESSURE: 147 MMHG

## 2022-12-07 LAB
-  AMPICILLIN: SIGNIFICANT CHANGE UP
-  CIPROFLOXACIN: SIGNIFICANT CHANGE UP
-  DAPTOMYCIN: SIGNIFICANT CHANGE UP
-  LEVOFLOXACIN: SIGNIFICANT CHANGE UP
-  LINEZOLID: SIGNIFICANT CHANGE UP
-  NITROFURANTOIN: SIGNIFICANT CHANGE UP
-  TETRACYCLINE: SIGNIFICANT CHANGE UP
-  VANCOMYCIN: SIGNIFICANT CHANGE UP
GLUCOSE BLDC GLUCOMTR-MCNC: 122 MG/DL — HIGH (ref 70–99)
GLUCOSE BLDC GLUCOMTR-MCNC: 125 MG/DL — HIGH (ref 70–99)
GLUCOSE BLDC GLUCOMTR-MCNC: 189 MG/DL — HIGH (ref 70–99)
METHOD TYPE: SIGNIFICANT CHANGE UP
SARS-COV-2 RNA SPEC QL NAA+PROBE: SIGNIFICANT CHANGE UP

## 2022-12-07 PROCEDURE — 70450 CT HEAD/BRAIN W/O DYE: CPT

## 2022-12-07 PROCEDURE — 92526 ORAL FUNCTION THERAPY: CPT

## 2022-12-07 PROCEDURE — 36415 COLL VENOUS BLD VENIPUNCTURE: CPT

## 2022-12-07 PROCEDURE — 97530 THERAPEUTIC ACTIVITIES: CPT

## 2022-12-07 PROCEDURE — 92610 EVALUATE SWALLOWING FUNCTION: CPT

## 2022-12-07 PROCEDURE — 93970 EXTREMITY STUDY: CPT

## 2022-12-07 PROCEDURE — 97166 OT EVAL MOD COMPLEX 45 MIN: CPT

## 2022-12-07 PROCEDURE — 86850 RBC ANTIBODY SCREEN: CPT

## 2022-12-07 PROCEDURE — 80048 BASIC METABOLIC PNL TOTAL CA: CPT

## 2022-12-07 PROCEDURE — 87040 BLOOD CULTURE FOR BACTERIA: CPT

## 2022-12-07 PROCEDURE — 84100 ASSAY OF PHOSPHORUS: CPT

## 2022-12-07 PROCEDURE — U0005: CPT

## 2022-12-07 PROCEDURE — 80053 COMPREHEN METABOLIC PANEL: CPT

## 2022-12-07 PROCEDURE — 71045 X-RAY EXAM CHEST 1 VIEW: CPT

## 2022-12-07 PROCEDURE — 86900 BLOOD TYPING SEROLOGIC ABO: CPT

## 2022-12-07 PROCEDURE — 95714 VEEG EA 12-26 HR UNMNTR: CPT

## 2022-12-07 PROCEDURE — 74177 CT ABD & PELVIS W/CONTRAST: CPT

## 2022-12-07 PROCEDURE — 99239 HOSP IP/OBS DSCHRG MGMT >30: CPT

## 2022-12-07 PROCEDURE — 87186 SC STD MICRODIL/AGAR DIL: CPT

## 2022-12-07 PROCEDURE — 97110 THERAPEUTIC EXERCISES: CPT

## 2022-12-07 PROCEDURE — U0003: CPT

## 2022-12-07 PROCEDURE — 97162 PT EVAL MOD COMPLEX 30 MIN: CPT

## 2022-12-07 PROCEDURE — 82962 GLUCOSE BLOOD TEST: CPT

## 2022-12-07 PROCEDURE — 82272 OCCULT BLD FECES 1-3 TESTS: CPT

## 2022-12-07 PROCEDURE — 81001 URINALYSIS AUTO W/SCOPE: CPT

## 2022-12-07 PROCEDURE — 95700 EEG CONT REC W/VID EEG TECH: CPT

## 2022-12-07 PROCEDURE — 86901 BLOOD TYPING SEROLOGIC RH(D): CPT

## 2022-12-07 PROCEDURE — 87086 URINE CULTURE/COLONY COUNT: CPT

## 2022-12-07 PROCEDURE — 83735 ASSAY OF MAGNESIUM: CPT

## 2022-12-07 PROCEDURE — 85027 COMPLETE CBC AUTOMATED: CPT

## 2022-12-07 RX ORDER — METFORMIN HYDROCHLORIDE 850 MG/1
1 TABLET ORAL
Qty: 60 | Refills: 0
Start: 2022-12-07 | End: 2023-01-05

## 2022-12-07 RX ADMIN — Medication 30 MILLIGRAM(S): at 08:41

## 2022-12-07 RX ADMIN — Medication 50 MILLIGRAM(S): at 08:43

## 2022-12-07 RX ADMIN — Medication 1000 UNIT(S): at 11:52

## 2022-12-07 RX ADMIN — APIXABAN 5 MILLIGRAM(S): 2.5 TABLET, FILM COATED ORAL at 08:42

## 2022-12-07 RX ADMIN — Medication 30 MILLIGRAM(S): at 15:53

## 2022-12-07 RX ADMIN — GABAPENTIN 400 MILLIGRAM(S): 400 CAPSULE ORAL at 15:52

## 2022-12-07 RX ADMIN — Medication 20 MILLIGRAM(S): at 08:41

## 2022-12-07 RX ADMIN — GABAPENTIN 400 MILLIGRAM(S): 400 CAPSULE ORAL at 08:42

## 2022-12-07 RX ADMIN — Medication 2: at 11:51

## 2022-12-07 RX ADMIN — LOSARTAN POTASSIUM 50 MILLIGRAM(S): 100 TABLET, FILM COATED ORAL at 08:42

## 2022-12-07 NOTE — PROVIDER CONTACT NOTE (OTHER) - REASON
Blood in urine
Pt is refusing meds 2200 meds
Pt is refusing meds 0600 meds
Pt refusing all medications as well as food.
Pt is refusing meds
Pt is refusing meds
Pt scheduled for transfer off unit /disconnection from VEEG monitoring /reconnection pending  EEG dept
Pt refusing all PO medications.
pt refusing medications

## 2022-12-07 NOTE — DISCHARGE NOTE NURSING/CASE MANAGEMENT/SOCIAL WORK - NSDCFUADDAPPT_GEN_ALL_CORE_FT
APPTS ARE READY TO BE MADE: [x ] YES    Best Family or Patient Contact (if needed): WifeSara Aleman     Additional Information about above appointments (if needed):    1: Please followup with vascular cardiologist Dr Thomas  2: Please followup with geriatric MD Dr Cruz  3:     Other comments or requests:       Patient was provided with Dr. Thomas and Dr. Cruz and was advised to call to schedule follow up within specified time frame. At this time patient declined scheduling assistance.

## 2022-12-07 NOTE — PROVIDER CONTACT NOTE (OTHER) - DATE AND TIME:
30-Nov-2022 22:55
02-Dec-2022 06:21
06-Dec-2022 05:49
07-Dec-2022 05:08
04-Dec-2022 09:30
04-Dec-2022 21:00
06-Dec-2022 23:00
03-Dec-2022 21:10
05-Dec-2022 20:22

## 2022-12-07 NOTE — PROGRESS NOTE ADULT - PROVIDER SPECIALTY LIST ADULT
Vascular Cardiology
Hospitalist
Hospitalist
Infectious Disease
Neurosurgery
Neurosurgery
Vascular Cardiology
Neurosurgery
Hospitalist
Hospitalist
Neurosurgery
Hospitalist

## 2022-12-07 NOTE — PROGRESS NOTE ADULT - ASSESSMENT
72M with a past medical history of  CAD, HTN, HLD, Afib (Eliquis held since SDH), R SDH evac in 9/2021 and more resent SDH s/p mechanical fall on 10/19 (s/p andexanet), initially presented to [facility]  for increased lethargy. Pt was treated for UTI, PNA. Admitted to NSGY service to rule out enlarging subdural hematoma ; given stable head CTs  there are no plans for surgical intervention per NSGY; pt transfered to the Medicine service.

## 2022-12-07 NOTE — PROGRESS NOTE ADULT - PROBLEM SELECTOR PLAN 9
DVT ppx : Eliquis    PT reccs Home with Home PT for strengthening, bed mob, transfer, gait and balance training; Home PT  (likely dc home when HHA is set up) DVT ppx : Eliquis    PT reccs Home with Home PT for strengthening, bed mob, transfer, gait and balance training; Home PT  (likely dc home when HHA is set up)    Per discussion with the CM and patient's wife, the patient will be discharged home today. Private HHA will be set up. Discharge time spent 40 minutes.

## 2022-12-07 NOTE — PROGRESS NOTE ADULT - SUBJECTIVE AND OBJECTIVE BOX
[facility] Division of Hospital Medicine  Prosper Newell MD, MORE  I'm reachable on [employer] Teams   Off hours:  [phone] ([facility] pager)    Patient is a 72y old  Male who presents with a chief complaint of Infectious workup (06 Dec 2022 16:48)      SUBJECTIVE / OVERNIGHT EVENTS:  No events overnight. No acute issues.     MEDICATIONS  (STANDING):  apixaban 5 milliGRAM(s) Oral every 12 hours  atorvastatin 40 milliGRAM(s) Oral at bedtime  cholecalciferol 1000 Unit(s) Oral daily  diltiazem    Tablet 30 milliGRAM(s) Oral every 6 hours  gabapentin 400 milliGRAM(s) Oral three times a day  glucagon  Injectable 1 milliGRAM(s) IntraMuscular once  hydrALAZINE 50 milliGRAM(s) Oral every 8 hours  insulin lispro (ADMELOG) corrective regimen sliding scale   SubCutaneous three times a day before meals  latanoprost 0.005% Ophthalmic Solution 1 Drop(s) Both EYES at bedtime  losartan 50 milliGRAM(s) Oral every 12 hours  methylphenidate 20 milliGRAM(s) Oral <User Schedule>  mirtazapine 7.5 milliGRAM(s) Oral at bedtime  sodium chloride 0.9%. 1000 milliLiter(s) (60 mL/Hr) IV Continuous <Continuous>    MEDICATIONS  (PRN):  acetaminophen     Tablet .. 650 milliGRAM(s) Oral every 6 hours PRN Mild Pain (1 - 3)  aluminum hydroxide/magnesium hydroxide/simethicone Suspension 30 milliLiter(s) Oral every 4 hours PRN Dyspepsia  dextrose Oral Gel 15 Gram(s) Oral once PRN Blood Glucose LESS THAN 70 milliGRAM(s)/deciliter  ondansetron Injectable 4 milliGRAM(s) IV Push once PRN Nausea    CAPILLARY BLOOD GLUCOSE  POCT Blood Glucose.: 125 mg/dL (07 Dec 2022 07:35)  POCT Blood Glucose.: 189 mg/dL (06 Dec 2022 21:24)  POCT Blood Glucose.: 120 mg/dL (06 Dec 2022 16:33)  POCT Blood Glucose.: 181 mg/dL (06 Dec 2022 12:42)    I&O's Summary    06 Dec 2022 07:01  -  07 Dec 2022 07:00  --------------------------------------------------------  IN: 1630 mL / OUT: 0 mL / NET: 1630 mL        PHYSICAL EXAM:  Vital Signs Last 24 Hrs  T(C): 37 (07 Dec 2022 09:09), Max: 37 (06 Dec 2022 13:31)  T(F): 98.6 (07 Dec 2022 09:09), Max: 98.6 (06 Dec 2022 13:31)  HR: 90 (07 Dec 2022 09:09) (77 - 92)  BP: 144/83 (07 Dec 2022 09:09) (112/78 - 144/83)  BP(mean): --  RR: 18 (07 Dec 2022 09:09) (18 - 19)  SpO2: 98% (07 Dec 2022 09:09) (97% - 98%)    Parameters below as of 07 Dec 2022 09:09  Patient On (Oxygen Delivery Method): room air    GENERAL: elderly man, NAD  HEAD:  Atraumatic, Normocephalic  EYES: EOMI, conjunctiva and sclera clear  NECK: Supple, No JVD  CHEST/LUNG: Clear to auscultation bilaterally; No wheeze  HEART: Regular rate and rhythm; No murmurs, rubs, or gallops  ABDOMEN: Soft, Nontender, Nondistended; Bowel sounds present  EXTREMITIES:  No clubbing, cyanosis, or edema  PSYCH: alert, did not really talk, answered questions by nodding his head.       LABS:                        11.9   8.82  )-----------( 194      ( 06 Dec 2022 06:19 )             37.9     12-06    143  |  108  |  8   ----------------------------<  134<H>  3.6   |  23  |  0.83    Ca    8.5      06 Dec 2022 06:19            Urinalysis Basic - ( 05 Dec 2022 16:10 )    Color: Light Yellow / Appearance: Clear / S.012 / pH: x  Gluc: x / Ketone: Negative  / Bili: Negative / Urobili: Negative   Blood: x / Protein: Trace / Nitrite: Negative   Leuk Esterase: Negative / RBC: 1 /hpf / WBC 2 /HPF   Sq Epi: x / Non Sq Epi: 1 /hpf / Bacteria: Negative        Culture - Urine (collected 05 Dec 2022 16:10)  Source: Clean Catch Clean Catch (Midstream)  Preliminary Report (06 Dec 2022 16:50):    10,000 - 49,000 CFU/mL Gram positive organisms        RADIOLOGY & ADDITIONAL TESTS:    Lab Results Reviewed

## 2022-12-07 NOTE — PROGRESS NOTE ADULT - PROBLEM SELECTOR PLAN 1
Proximal LLE DVT.   Vascular cardiology consult appreciated - transitioned back to Eliquis 5mg PO BID (Of note, the patient frequently refuses medications)  CT head imaging stable SDH  Monitor for any s/s of bleeding, neurological changes  No acute changes in mental status this morning  Plan of care discussed with the patient's spouse who is in agreement with the case as outlined.    THANG working on transition home with [facility] vs [facility] (looking for accepting agency now)

## 2022-12-07 NOTE — PROGRESS NOTE ADULT - REASON FOR ADMISSION
Infectious workup

## 2022-12-07 NOTE — PROVIDER CONTACT NOTE (OTHER) - BACKGROUND
pt admitted for AMS & fall
pt transferred from OSH for SDH s/p mechanical fall
dx SDH sd/p mechanical fall on 10/19
pt admitted for AMS. with recent fall
SDH
pt admitted for AMS & fall

## 2022-12-07 NOTE — PROVIDER CONTACT NOTE (OTHER) - ASSESSMENT
VSS. Pt neurologically unchanged and at baseline of A&O self
VSS pt refusing to open mouth
pt neurologically unchanged A&OX0, NEX4, 3RB. VS w/in parameters.
Pt stable neurologically clinical assessment  unchanged . Pt scheduled to  transfer to [facility]
VSS. Pt neurologically unchanged and at baseline of A&O self
Pt refusing all medications as well as food.
VSS. Pt neurologically unchanged and at baseline of A&O self
Patient incontinent, while rendering incontinent care bloody urine noted in brief
VSS. /89 HR 89. Pt neurologically unchanged and at baseline of A&O self

## 2022-12-07 NOTE — PROGRESS NOTE ADULT - PROBLEM SELECTOR PLAN 10
Med rec completed in [place]
Med rec completed in [place]
Med rec completed in [place]
Med rec completed in [place]
Med rec completed in [place]
Med rec completed in [place]
Med rec completed in [place]

## 2022-12-07 NOTE — PROVIDER CONTACT NOTE (OTHER) - RECOMMENDATIONS
As per PA
NGT placement
As per ACP
As per PA
urinalysis and occult blood as per provider
eval
As per PA
Informing provider

## 2022-12-07 NOTE — PROVIDER CONTACT NOTE (OTHER) - ACTION/TREATMENT ORDERED:
Provider aware.
Provider made aware. haylee meds as not done.   No interventions taken at this time
Provider made aware. Reschedule all 0600 meds for 0800.   No interventions taken at this time
awaiting sample of urine and feces to send to lab and continue to assess further
As per PA do not disconnect pt unless EEG dept available and able to reconnect pt to monitoring .
Provider made aware. haylee meds as not done.   No interventions taken at this time
Kamini Cruz made aware, as per family refusing NGT. Will attempt to administer medications at a later time
Provider made aware. haylee meds as not done.
Provider made aware. haylee meds as not done.   No interventions taken at this time

## 2022-12-07 NOTE — PROVIDER CONTACT NOTE (OTHER) - NAME OF MD/NP/PA/DO NOTIFIED:
DARYN Rebollar
Kamini Cruz ACP
Medicine ACP Alice Martinez
Sona Xavier
Medicine DARYN Capellan
ACP Nikki Meyer
Medicine ACP Alice Martinez
Medicine DARYN Capellan
Torrie Pascual, ACP

## 2022-12-07 NOTE — DISCHARGE NOTE NURSING/CASE MANAGEMENT/SOCIAL WORK - PATIENT PORTAL LINK FT
You can access the FollowMyHealth Patient Portal offered by [facility] by registering at the following website: [email or web address]. By joining [employer]’s FollowMyHealth portal, you will also be able to view your health information using other applications (apps) compatible with our system.

## 2022-12-07 NOTE — PROVIDER CONTACT NOTE (OTHER) - SITUATION
Pt refusing all PO medications. Does not want to be bothered and rude when approaching pt.
Pt is refusing meds
Blood in urine
Pt is refusing 0600 meds
Pt is refusing meds
Pt refusing all medications as well as food.
Pt is refusing meds
Pt s/p SDH / PNA lethargy  AXOX2-3. maintained on VEEG
attempted several times to administer medications. educated pt on importance of medication, pt refusing to take.

## 2022-12-07 NOTE — PROGRESS NOTE ADULT - PROBLEM SELECTOR PROBLEM 1
Acute DVT (deep venous thrombosis)
AMS (altered mental status)
Atrial fibrillation
Atrial fibrillation
AMS (altered mental status)
Acute DVT (deep venous thrombosis)

## 2022-12-07 NOTE — PROGRESS NOTE ADULT - NUTRITIONAL ASSESSMENT
This patient has been assessed with a concern for Malnutrition and has been determined to have a diagnosis/diagnoses of Moderate protein-calorie malnutrition.    This patient is being managed with:   Diet Soft and Bite Sized-  Consistent Carbohydrate {Evening Snack} (CSTCHOSN)  Mildly Thick Liquids (MILDTHICKLIQS)  Supplement Feeding Modality:  Oral  Glucerna Shake Cans or Servings Per Day:  2       Frequency:  Daily  Entered: Dec  4 2022  5:27PM    

## 2022-12-08 ENCOUNTER — NON-APPOINTMENT (OUTPATIENT)
Age: 72
End: 2022-12-08

## 2022-12-08 LAB
-  AMPICILLIN: SIGNIFICANT CHANGE UP
-  CIPROFLOXACIN: SIGNIFICANT CHANGE UP
-  LEVOFLOXACIN: SIGNIFICANT CHANGE UP
-  NITROFURANTOIN: SIGNIFICANT CHANGE UP
-  TETRACYCLINE: SIGNIFICANT CHANGE UP
-  VANCOMYCIN: SIGNIFICANT CHANGE UP
CULTURE RESULTS: SIGNIFICANT CHANGE UP
METHOD TYPE: SIGNIFICANT CHANGE UP
ORGANISM # SPEC MICROSCOPIC CNT: SIGNIFICANT CHANGE UP
SPECIMEN SOURCE: SIGNIFICANT CHANGE UP

## 2023-01-04 ENCOUNTER — RX RENEWAL (OUTPATIENT)
Age: 73
End: 2023-01-04

## 2023-01-06 ENCOUNTER — NON-APPOINTMENT (OUTPATIENT)
Age: 73
End: 2023-01-06

## 2023-01-07 ENCOUNTER — INPATIENT (INPATIENT)
Facility: HOSPITAL | Age: 73
LOS: 2 days | Discharge: HOME | DRG: 193 | End: 2023-01-10
Attending: HOSPITALIST | Admitting: INTERNAL MEDICINE
Payer: COMMERCIAL

## 2023-01-07 VITALS
DIASTOLIC BLOOD PRESSURE: 96 MMHG | HEIGHT: 65 IN | SYSTOLIC BLOOD PRESSURE: 158 MMHG | OXYGEN SATURATION: 97 % | TEMPERATURE: 101 F | RESPIRATION RATE: 18 BRPM | WEIGHT: 160.06 LBS | HEART RATE: 120 BPM

## 2023-01-07 DIAGNOSIS — F32.9 MAJOR DEPRESSIVE DISORDER, SINGLE EPISODE, UNSPECIFIED: ICD-10-CM

## 2023-01-07 DIAGNOSIS — J10.1 INFLUENZA DUE TO OTHER IDENTIFIED INFLUENZA VIRUS WITH OTHER RESPIRATORY MANIFESTATIONS: ICD-10-CM

## 2023-01-07 DIAGNOSIS — E11.9 TYPE 2 DIABETES MELLITUS WITHOUT COMPLICATIONS: ICD-10-CM

## 2023-01-07 DIAGNOSIS — I10 ESSENTIAL (PRIMARY) HYPERTENSION: ICD-10-CM

## 2023-01-07 DIAGNOSIS — I48.91 UNSPECIFIED ATRIAL FIBRILLATION: ICD-10-CM

## 2023-01-07 DIAGNOSIS — Z98.890 OTHER SPECIFIED POSTPROCEDURAL STATES: Chronic | ICD-10-CM

## 2023-01-07 DIAGNOSIS — I82.409 ACUTE EMBOLISM AND THROMBOSIS OF UNSPECIFIED DEEP VEINS OF UNSPECIFIED LOWER EXTREMITY: ICD-10-CM

## 2023-01-07 DIAGNOSIS — G62.9 POLYNEUROPATHY, UNSPECIFIED: ICD-10-CM

## 2023-01-07 DIAGNOSIS — Z29.9 ENCOUNTER FOR PROPHYLACTIC MEASURES, UNSPECIFIED: ICD-10-CM

## 2023-01-07 DIAGNOSIS — J11.1 INFLUENZA DUE TO UNIDENTIFIED INFLUENZA VIRUS WITH OTHER RESPIRATORY MANIFESTATIONS: ICD-10-CM

## 2023-01-07 DIAGNOSIS — Z87.09 PERSONAL HISTORY OF OTHER DISEASES OF THE RESPIRATORY SYSTEM: ICD-10-CM

## 2023-01-07 DIAGNOSIS — G12.20 MOTOR NEURON DISEASE, UNSPECIFIED: ICD-10-CM

## 2023-01-07 LAB
ALBUMIN SERPL ELPH-MCNC: 3.1 G/DL — LOW (ref 3.3–5)
ALP SERPL-CCNC: 81 U/L — SIGNIFICANT CHANGE UP (ref 40–120)
ALT FLD-CCNC: 16 U/L — SIGNIFICANT CHANGE UP (ref 12–78)
ANION GAP SERPL CALC-SCNC: 6 MMOL/L — SIGNIFICANT CHANGE UP (ref 5–17)
APPEARANCE UR: CLEAR — SIGNIFICANT CHANGE UP
APTT BLD: 27 SEC — LOW (ref 27.5–35.5)
AST SERPL-CCNC: 18 U/L — SIGNIFICANT CHANGE UP (ref 15–37)
BASOPHILS # BLD AUTO: 0 K/UL — SIGNIFICANT CHANGE UP (ref 0–0.2)
BASOPHILS NFR BLD AUTO: 0 % — SIGNIFICANT CHANGE UP (ref 0–2)
BILIRUB SERPL-MCNC: 0.6 MG/DL — SIGNIFICANT CHANGE UP (ref 0.2–1.2)
BILIRUB UR-MCNC: NEGATIVE — SIGNIFICANT CHANGE UP
BUN SERPL-MCNC: 10 MG/DL — SIGNIFICANT CHANGE UP (ref 7–23)
CALCIUM SERPL-MCNC: 9 MG/DL — SIGNIFICANT CHANGE UP (ref 8.5–10.1)
CHLORIDE SERPL-SCNC: 109 MMOL/L — HIGH (ref 96–108)
CO2 SERPL-SCNC: 27 MMOL/L — SIGNIFICANT CHANGE UP (ref 22–31)
COLOR SPEC: YELLOW — SIGNIFICANT CHANGE UP
CREAT SERPL-MCNC: 0.95 MG/DL — SIGNIFICANT CHANGE UP (ref 0.5–1.3)
DIFF PNL FLD: NEGATIVE — SIGNIFICANT CHANGE UP
EGFR: 86 ML/MIN/1.73M2 — SIGNIFICANT CHANGE UP
EOSINOPHIL # BLD AUTO: 0.08 K/UL — SIGNIFICANT CHANGE UP (ref 0–0.5)
EOSINOPHIL NFR BLD AUTO: 1 % — SIGNIFICANT CHANGE UP (ref 0–6)
FLUAV AG NPH QL: DETECTED
FLUBV AG NPH QL: SIGNIFICANT CHANGE UP
GLUCOSE SERPL-MCNC: 121 MG/DL — HIGH (ref 70–99)
GLUCOSE UR QL: NEGATIVE — SIGNIFICANT CHANGE UP
HCT VFR BLD CALC: 40.3 % — SIGNIFICANT CHANGE UP (ref 39–50)
HGB BLD-MCNC: 12.9 G/DL — LOW (ref 13–17)
INR BLD: 1.71 RATIO — HIGH (ref 0.88–1.16)
KETONES UR-MCNC: NEGATIVE — SIGNIFICANT CHANGE UP
LACTATE SERPL-SCNC: 1.3 MMOL/L — SIGNIFICANT CHANGE UP (ref 0.7–2)
LEUKOCYTE ESTERASE UR-ACNC: NEGATIVE — SIGNIFICANT CHANGE UP
LYMPHOCYTES # BLD AUTO: 0.24 K/UL — LOW (ref 1–3.3)
LYMPHOCYTES # BLD AUTO: 3 % — LOW (ref 13–44)
MCHC RBC-ENTMCNC: 26.5 PG — LOW (ref 27–34)
MCHC RBC-ENTMCNC: 32 GM/DL — SIGNIFICANT CHANGE UP (ref 32–36)
MCV RBC AUTO: 82.9 FL — SIGNIFICANT CHANGE UP (ref 80–100)
MONOCYTES # BLD AUTO: 0.48 K/UL — SIGNIFICANT CHANGE UP (ref 0–0.9)
MONOCYTES NFR BLD AUTO: 6 % — SIGNIFICANT CHANGE UP (ref 2–14)
NEUTROPHILS # BLD AUTO: 7.13 K/UL — SIGNIFICANT CHANGE UP (ref 1.8–7.4)
NEUTROPHILS NFR BLD AUTO: 89 % — HIGH (ref 43–77)
NITRITE UR-MCNC: NEGATIVE — SIGNIFICANT CHANGE UP
NRBC # BLD: SIGNIFICANT CHANGE UP /100 WBCS (ref 0–0)
NT-PROBNP SERPL-SCNC: 1424 PG/ML — HIGH (ref 0–125)
PH UR: 7 — SIGNIFICANT CHANGE UP (ref 5–8)
PLATELET # BLD AUTO: 156 K/UL — SIGNIFICANT CHANGE UP (ref 150–400)
POTASSIUM SERPL-MCNC: 4.3 MMOL/L — SIGNIFICANT CHANGE UP (ref 3.5–5.3)
POTASSIUM SERPL-SCNC: 4.3 MMOL/L — SIGNIFICANT CHANGE UP (ref 3.5–5.3)
PROT SERPL-MCNC: 6.7 G/DL — SIGNIFICANT CHANGE UP (ref 6–8.3)
PROT UR-MCNC: 30 MG/DL
PROTHROM AB SERPL-ACNC: 20.1 SEC — HIGH (ref 10.5–13.4)
RBC # BLD: 4.86 M/UL — SIGNIFICANT CHANGE UP (ref 4.2–5.8)
RBC # FLD: 18.8 % — HIGH (ref 10.3–14.5)
RSV RNA NPH QL NAA+NON-PROBE: SIGNIFICANT CHANGE UP
SARS-COV-2 RNA SPEC QL NAA+PROBE: SIGNIFICANT CHANGE UP
SODIUM SERPL-SCNC: 142 MMOL/L — SIGNIFICANT CHANGE UP (ref 135–145)
SP GR SPEC: 1 — LOW (ref 1.01–1.02)
TROPONIN I, HIGH SENSITIVITY RESULT: 9.1 NG/L — SIGNIFICANT CHANGE UP
UROBILINOGEN FLD QL: NEGATIVE — SIGNIFICANT CHANGE UP
WBC # BLD: 7.92 K/UL — SIGNIFICANT CHANGE UP (ref 3.8–10.5)
WBC # FLD AUTO: 7.92 K/UL — SIGNIFICANT CHANGE UP (ref 3.8–10.5)

## 2023-01-07 PROCEDURE — 71045 X-RAY EXAM CHEST 1 VIEW: CPT | Mod: 26

## 2023-01-07 PROCEDURE — 99285 EMERGENCY DEPT VISIT HI MDM: CPT

## 2023-01-07 PROCEDURE — 70450 CT HEAD/BRAIN W/O DYE: CPT | Mod: 26,MA

## 2023-01-07 PROCEDURE — 71250 CT THORAX DX C-: CPT | Mod: 26,MA

## 2023-01-07 PROCEDURE — 93970 EXTREMITY STUDY: CPT | Mod: 26

## 2023-01-07 PROCEDURE — 99223 1ST HOSP IP/OBS HIGH 75: CPT | Mod: GC

## 2023-01-07 RX ORDER — INSULIN LISPRO 100/ML
VIAL (ML) SUBCUTANEOUS
Refills: 0 | Status: DISCONTINUED | OUTPATIENT
Start: 2023-01-07 | End: 2023-01-10

## 2023-01-07 RX ORDER — LATANOPROST 0.05 MG/ML
1 SOLUTION/ DROPS OPHTHALMIC; TOPICAL AT BEDTIME
Refills: 0 | Status: DISCONTINUED | OUTPATIENT
Start: 2023-01-07 | End: 2023-01-10

## 2023-01-07 RX ORDER — FLUTICASONE PROPIONATE 50 MCG
1 SPRAY, SUSPENSION NASAL
Refills: 0 | Status: DISCONTINUED | OUTPATIENT
Start: 2023-01-07 | End: 2023-01-10

## 2023-01-07 RX ORDER — METHYLPHENIDATE HCL 5 MG
10 TABLET ORAL
Refills: 0 | Status: DISCONTINUED | OUTPATIENT
Start: 2023-01-07 | End: 2023-01-10

## 2023-01-07 RX ORDER — BENZOCAINE 10 %
1 GEL (GRAM) MUCOUS MEMBRANE EVERY 6 HOURS
Refills: 0 | Status: DISCONTINUED | OUTPATIENT
Start: 2023-01-07 | End: 2023-01-10

## 2023-01-07 RX ORDER — CEFTRIAXONE 500 MG/1
1000 INJECTION, POWDER, FOR SOLUTION INTRAMUSCULAR; INTRAVENOUS ONCE
Refills: 0 | Status: COMPLETED | OUTPATIENT
Start: 2023-01-07 | End: 2023-01-07

## 2023-01-07 RX ORDER — SODIUM CHLORIDE 9 MG/ML
1000 INJECTION INTRAMUSCULAR; INTRAVENOUS; SUBCUTANEOUS
Refills: 0 | Status: DISCONTINUED | OUTPATIENT
Start: 2023-01-07 | End: 2023-01-07

## 2023-01-07 RX ORDER — GLUCAGON INJECTION, SOLUTION 0.5 MG/.1ML
1 INJECTION, SOLUTION SUBCUTANEOUS ONCE
Refills: 0 | Status: DISCONTINUED | OUTPATIENT
Start: 2023-01-07 | End: 2023-01-10

## 2023-01-07 RX ORDER — SODIUM CHLORIDE 9 MG/ML
1000 INJECTION, SOLUTION INTRAVENOUS
Refills: 0 | Status: DISCONTINUED | OUTPATIENT
Start: 2023-01-07 | End: 2023-01-10

## 2023-01-07 RX ORDER — ROSUVASTATIN CALCIUM 5 MG/1
1 TABLET ORAL
Qty: 0 | Refills: 0 | DISCHARGE

## 2023-01-07 RX ORDER — DEXTROSE 50 % IN WATER 50 %
15 SYRINGE (ML) INTRAVENOUS ONCE
Refills: 0 | Status: DISCONTINUED | OUTPATIENT
Start: 2023-01-07 | End: 2023-01-10

## 2023-01-07 RX ORDER — ACETAMINOPHEN 500 MG
1000 TABLET ORAL ONCE
Refills: 0 | Status: COMPLETED | OUTPATIENT
Start: 2023-01-07 | End: 2023-01-07

## 2023-01-07 RX ORDER — HYDRALAZINE HCL 50 MG
50 TABLET ORAL THREE TIMES A DAY
Refills: 0 | Status: DISCONTINUED | OUTPATIENT
Start: 2023-01-07 | End: 2023-01-10

## 2023-01-07 RX ORDER — SODIUM CHLORIDE 9 MG/ML
1000 INJECTION INTRAMUSCULAR; INTRAVENOUS; SUBCUTANEOUS ONCE
Refills: 0 | Status: COMPLETED | OUTPATIENT
Start: 2023-01-07 | End: 2023-01-07

## 2023-01-07 RX ORDER — LOSARTAN POTASSIUM 100 MG/1
50 TABLET, FILM COATED ORAL
Refills: 0 | Status: DISCONTINUED | OUTPATIENT
Start: 2023-01-07 | End: 2023-01-10

## 2023-01-07 RX ORDER — ALBUTEROL 90 UG/1
2 AEROSOL, METERED ORAL EVERY 6 HOURS
Refills: 0 | Status: DISCONTINUED | OUTPATIENT
Start: 2023-01-07 | End: 2023-01-10

## 2023-01-07 RX ORDER — APIXABAN 2.5 MG/1
2.5 TABLET, FILM COATED ORAL EVERY 12 HOURS
Refills: 0 | Status: DISCONTINUED | OUTPATIENT
Start: 2023-01-07 | End: 2023-01-08

## 2023-01-07 RX ORDER — ACETAMINOPHEN 500 MG
650 TABLET ORAL EVERY 6 HOURS
Refills: 0 | Status: DISCONTINUED | OUTPATIENT
Start: 2023-01-07 | End: 2023-01-10

## 2023-01-07 RX ORDER — DILTIAZEM HCL 120 MG
60 CAPSULE, EXT RELEASE 24 HR ORAL EVERY 6 HOURS
Refills: 0 | Status: DISCONTINUED | OUTPATIENT
Start: 2023-01-07 | End: 2023-01-09

## 2023-01-07 RX ORDER — INSULIN LISPRO 100/ML
VIAL (ML) SUBCUTANEOUS AT BEDTIME
Refills: 0 | Status: DISCONTINUED | OUTPATIENT
Start: 2023-01-07 | End: 2023-01-10

## 2023-01-07 RX ORDER — MIRTAZAPINE 45 MG/1
7.5 TABLET, ORALLY DISINTEGRATING ORAL AT BEDTIME
Refills: 0 | Status: DISCONTINUED | OUTPATIENT
Start: 2023-01-07 | End: 2023-01-10

## 2023-01-07 RX ORDER — DILTIAZEM HCL 120 MG
10 CAPSULE, EXT RELEASE 24 HR ORAL ONCE
Refills: 0 | Status: COMPLETED | OUTPATIENT
Start: 2023-01-07 | End: 2023-01-07

## 2023-01-07 RX ORDER — DILTIAZEM HCL 120 MG
120 CAPSULE, EXT RELEASE 24 HR ORAL DAILY
Refills: 0 | Status: DISCONTINUED | OUTPATIENT
Start: 2023-01-07 | End: 2023-01-07

## 2023-01-07 RX ORDER — CHOLECALCIFEROL (VITAMIN D3) 125 MCG
1 CAPSULE ORAL
Qty: 0 | Refills: 0 | DISCHARGE

## 2023-01-07 RX ORDER — DEXTROSE 50 % IN WATER 50 %
25 SYRINGE (ML) INTRAVENOUS ONCE
Refills: 0 | Status: DISCONTINUED | OUTPATIENT
Start: 2023-01-07 | End: 2023-01-10

## 2023-01-07 RX ORDER — SODIUM CHLORIDE 9 MG/ML
1000 INJECTION INTRAMUSCULAR; INTRAVENOUS; SUBCUTANEOUS
Refills: 0 | Status: DISCONTINUED | OUTPATIENT
Start: 2023-01-07 | End: 2023-01-10

## 2023-01-07 RX ORDER — GABAPENTIN 400 MG/1
300 CAPSULE ORAL THREE TIMES A DAY
Refills: 0 | Status: DISCONTINUED | OUTPATIENT
Start: 2023-01-07 | End: 2023-01-10

## 2023-01-07 RX ORDER — DEXTROSE 50 % IN WATER 50 %
12.5 SYRINGE (ML) INTRAVENOUS ONCE
Refills: 0 | Status: DISCONTINUED | OUTPATIENT
Start: 2023-01-07 | End: 2023-01-10

## 2023-01-07 RX ADMIN — CEFTRIAXONE 100 MILLIGRAM(S): 500 INJECTION, POWDER, FOR SOLUTION INTRAMUSCULAR; INTRAVENOUS at 12:13

## 2023-01-07 RX ADMIN — LATANOPROST 1 DROP(S): 0.05 SOLUTION/ DROPS OPHTHALMIC; TOPICAL at 22:23

## 2023-01-07 RX ADMIN — Medication 75 MILLIGRAM(S): at 17:51

## 2023-01-07 RX ADMIN — Medication 10 MILLIGRAM(S): at 14:41

## 2023-01-07 RX ADMIN — Medication 400 MILLIGRAM(S): at 11:48

## 2023-01-07 RX ADMIN — MIRTAZAPINE 7.5 MILLIGRAM(S): 45 TABLET, ORALLY DISINTEGRATING ORAL at 22:23

## 2023-01-07 RX ADMIN — Medication 50 MILLIGRAM(S): at 22:24

## 2023-01-07 RX ADMIN — SODIUM CHLORIDE 75 MILLILITER(S): 9 INJECTION INTRAMUSCULAR; INTRAVENOUS; SUBCUTANEOUS at 23:01

## 2023-01-07 RX ADMIN — GABAPENTIN 300 MILLIGRAM(S): 400 CAPSULE ORAL at 22:24

## 2023-01-07 RX ADMIN — Medication 60 MILLIGRAM(S): at 17:52

## 2023-01-07 RX ADMIN — SODIUM CHLORIDE 1000 MILLILITER(S): 9 INJECTION INTRAMUSCULAR; INTRAVENOUS; SUBCUTANEOUS at 11:03

## 2023-01-07 NOTE — H&P ADULT - ASSESSMENT
71 y/o M with PMH of HTN, HLD, CAD, TBI, atrial fibrillation (on apixaban) BIBA from home for fever, cough, lethargy w/ sick contact admitted for the management of metabolic encephalopathy 2/2 Influenza infection

## 2023-01-07 NOTE — H&P ADULT - PROBLEM SELECTOR PLAN 4
Chronic, on ritalin 20 mg @ 8 am and 10 mg 13:00, will continue   Fall and aspiration precautions   Change position every 2 hours   PT consulted Chronic, on ritalin 10 mg BID   - Fall and aspiration precautions   - PT consulted

## 2023-01-07 NOTE — ED PROVIDER NOTE - PHYSICAL EXAMINATION
Constitutional: Awake, Alert, non-toxic. NAD.   HEAD: Normocephalic, atraumatic.   EYES: EOM intact, conjunctiva and sclera are clear bilaterally.   ENT: No rhinorrhea, patent, mucous membranes pink/moist, no drooling or stridor.   NECK: Supple, non-tender  CARDIOVASCULAR: Normal S1, S2; regular rhythm. (+) tachycardia   RESPIRATORY: Normal respiratory effort; breath sounds CTAB, no wheezes, rhonchi, or rales. Speaking in full sentences. No accessory muscle use.   ABDOMEN: Soft; non-tender, non-distended.   EXTREMITIES: Full passive and active ROM in all extremities; non-tender to palpation; distal pulses palpable and symmetric  SKIN: Warm, dry; good skin turgor, no apparent lesions or rashes, no ecchymosis, brisk capillary refill.  NEURO: Limited extremity ROM, CN 2-12 intact, responds to touch

## 2023-01-07 NOTE — H&P ADULT - PROBLEM SELECTOR PLAN 9
Continue Eliquis 2.5 BID for DVT prophylaxis      #Home Meds  Continue latanoprost eye drops for eye pressure Continue Eliquis 2.5 BID for DVT prophylaxis      #Home Meds  Continue latanoprost eye drops for eye pressure    #Dispo  - DNR/DNI from [facility], in chart

## 2023-01-07 NOTE — H&P ADULT - PROBLEM SELECTOR PLAN 1
BIBA from home for fever, cough, lethargy w/ sick contact; Influenza A +  Metabolic encephalopathy 2/2 acute viral influenza  Out of window for Tamiflu  Supportive care  Monitor respiratory status  Robitussin PRN for cough  Tylenol PRN for fever  Supplemental O2 if warranted  Droplet precautions BIBA from home for fever, cough, lethargy w/ sick contact; Influenza A +  Metabolic encephalopathy 2/2 acute viral influenza  Tamiflu  Supportive care  Monitor respiratory status  Robitussin PRN for cough  Tylenol PRN for fever  Supplemental O2 if warranted  Droplet precautions Febrile, cough, lethargy w/ sick contact; Influenza A +  - Metabolic encephalopathy 2/2 acute viral influenza  - s/p 1 dose of Tamiflu, continue BID for 5 days   - Supportive care  - Monitor respiratory status  - Robitussin and Tessalon pearls PRN for cough  - Tylenol PRN for fever  - Supplemental O2 if warranted  - Droplet precautions Febrile, cough, lethargy w/ sick contact; Influenza A +  - Metabolic encephalopathy 2/2 acute viral influenza  - s/p 1 dose of Tamiflu, continue BID for 5 days   - Supportive care  - Monitor respiratory status  - Robitussin and Tessalon pearls PRN for cough  - Albuterol inhalation 4x daily to open airway   - Tylenol PRN for fever  - Supplemental O2 if warranted  - Droplet precautions

## 2023-01-07 NOTE — H&P ADULT - NSICDXFAMILYHX_GEN_ALL_CORE_FT
FAMILY HISTORY:  Father  Still living? Unknown  FH: HTN (hypertension), Age at diagnosis: Age Unknown    Mother  Still living? Unknown  FH: HTN (hypertension), Age at diagnosis: Age Unknown    Sibling  Still living? No  Family history of bone cancer, Age at diagnosis: Age Unknown  FH: Alzheimers disease, Age at diagnosis: Age Unknown  FH: HTN (hypertension), Age at diagnosis: Age Unknown

## 2023-01-07 NOTE — H&P ADULT - HISTORY OF PRESENT ILLNESS
73 y/o M with past medical history of hypertension, hyperlipidemia, CAD, TBI, atrial fibrillation (on apixaban) brought in by ambulance from home due to fever and cough.  Patient's family member reports she was recently sick with a fever and cough as well, but did not get tested.  Patient's family member reports patient was lethargic, not responding as he normally does, and had trouble waking him up this morning.  Denies vomiting, diarrhea, chest pain, respiratory distress, fall, head injury, or any other complaints        IN THE ED:  Temp   100.8 F ,  , /96 /  ,RR 18 , SpO2 97% on RA  S/P tyelnol x 1, diltiazem IVP 10mg x 1, rocephin x 1  EKG: Rate 118. Atrial fibrillation. Right ventricular hypertrophy. Left axis deviation. Anterior infarct (cited on or before 22-MAY-2022)  Labs significant for: Influenza A +. BNP 1424.  Imaging:   CXR - wet read - clear CXR  CT chest - No airspace opacities or pleural effusions  CT Head - Old infarcts as described above. Previously noted left-sided subdural collections are no longer seen. Previously noted subdural collection along the posterior and hemispheric region on the right side is decrease in size. Previously noted acute subdural hematoma along the demonstrate region is less conspicuous. 73 y/o M with past medical history of hypertension, hyperlipidemia, CAD, TBI, subdural hematoma s/p craniotomy in August 2021, atrial fibrillation (on Eliquis) brought in by ambulance from home due to fever and cough.  Patient's Wife present at bedside and reports pt began feeling sick with fever and cough midday on 1/6. Pt remained afebrile throughout the day, but was found to be feverish and lethargic this AM. At baseline, pt is disabled since his TBI in August 2021. Pt is able to feed himself with finger food, consumes mostly a thickened liquid/pureed diet, minimal communication, but able to answer yes or no questions, difficulty concentrating, wheelchair bound. Per wife, pt showed decreased engagement this morning and in the setting of fever, brought pt to the ED. Pt was found to be Flu+ in the ED and received 1x dose of Tamiflu. Of note, pt has not received any of his home medications today. Pt takes Eliquis for his afib, but was also found to have multiple DVTs following his last admission to [facility] for a subsequent brain bleed.      Denies vomiting, diarrhea, chest pain, respiratory distress, fall, head injury, or any other complaints        IN THE ED:  Temp   100.8 F ,  , /96 /  ,RR 18 , SpO2 97% on RA  S/P tyelnol x 1, diltiazem IVP 10mg x 1, rocephin x 1  EKG: Rate 118. Atrial fibrillation. Right ventricular hypertrophy. Left axis deviation. Anterior infarct (cited on or before 22-MAY-2022)  Labs significant for: Influenza A +. BNP 1424.  Imaging:   CXR - wet read - clear CXR  CT chest - No airspace opacities or pleural effusions  CT Head - Old infarcts as described above. Previously noted left-sided subdural collections are no longer seen. Previously noted subdural collection along the posterior and hemispheric region on the right side is decrease in size. Previously noted acute subdural hematoma along the demonstrate region is less conspicuous.

## 2023-01-07 NOTE — H&P ADULT - NSHPREVIEWOFSYSTEMS_GEN_ALL_CORE
Constitutional: denies fever, chills, sweating  HEENT: denies headache, dizziness, or lightheadedness  Respiratory: denies SOB, cough, or wheezing  Cardiovascular: denies CP, palpitations  Gastrointestinal: denies nausea, vomiting, diarrhea, constipation, abdominal pain, or bloody stools  Genitourinary: denies painful urination, increased frequency, urgency, or bloody urine  Skin/Breast: denies rashes or itching  Musculoskeletal: denies muscle aches, joint swelling, or muscle weakness  Neurologic: denies loss of sensation, numbness, or tingling  ROS negative except as noted above Constitutional: +fever and fatigue; denies chills, sweating  HEENT: denies headache, dizziness, or lightheadedness  Respiratory: +SOB, cough, and wheezing  Cardiovascular: denies CP, palpitations  Gastrointestinal: denies nausea, vomiting, diarrhea, constipation, abdominal pain, or bloody stools  Genitourinary: +incontinence; denies painful urination, increased frequency, urgency, or bloody urine  Musculoskeletal: denies muscle aches, joint swelling, or muscle weakness  Neurologic: +peripheral neuropathy   ROS negative except as noted above

## 2023-01-07 NOTE — H&P ADULT - PROBLEM SELECTOR PLAN 8
Continue Eliquis 2.5 BID for DVT prophylaxis          #Home Meds  Continue latanoprost Continue Eliquis 2.5 BID for DVT prophylaxis      #Home Meds  Continue latanoprost eye drops for eye pressure Per wife, hx of DVTs in bilateral lower extremities during TBI hospitalization   - B/L LE edematous, but not tender to palpation   - B/L LE dopplers ordered, f/u results   - On home Eliquis

## 2023-01-07 NOTE — ED ADULT NURSE REASSESSMENT NOTE - NS ED NURSE REASSESS COMMENT FT1
Pt received sleeping on stretcher with wife at bedside. Pt not verbalizing at this time, cooperates, no c/o pain or discomfort. CM= afib with RVR ar 112-117. VS as noted.

## 2023-01-07 NOTE — ED PROVIDER NOTE - CARE PLAN
1 Principal Discharge DX:	Influenza  Secondary Diagnosis:	Hypoxia  Secondary Diagnosis:	Atrial fibrillation

## 2023-01-07 NOTE — ED PROVIDER NOTE - OBJECTIVE STATEMENT
72-year-old male with past medical history of hypertension, hyperlipidemia, CAD, TBI, atrial fibrillation (on apixaban) brought in by ambulance from home due to fever and cough.  Patient's family member reports she was recently sick with a fever and cough as well, but did not get tested.  Patient's family member reports patient was lethargic, not responding as he normally does, and had trouble waking him up this morning.  Denies vomiting, diarrhea, chest pain, respiratory distress, fall, head injury, or any other complaints.

## 2023-01-07 NOTE — H&P ADULT - PROBLEM SELECTOR PLAN 5
Not on home insulin  Hold oral diabetes meds  AM A1C  LDISS  Finger sticks, Consistent carb diet  Hypoglycemic protocol Not on home insulin  - Hold oral diabetes meds  - Last A1c 9.3, November 23, 2022   - LDSS  - Finger sticks, Consistent carb diet (minced and moist  with thickened liquids)   - Speech and Swallow evaluation pending   - Hypoglycemic protocol

## 2023-01-07 NOTE — ED ADULT NURSE NOTE - OBJECTIVE STATEMENT
Patient is 72-year-old male with past medical history of hypertension, hyperlipidemia, CAD, TBI, atrial fibrillation (on apixaban) brought in by ambulance from home due to fever and cough.  Patient's family member reports she was recently sick with a fever and cough as well, but did not get tested.  Patient's family member reports patient was lethargic, not responding as he normally does, and had trouble waking him up this morning.  Denies vomiting, diarrhea, chest pain, respiratory distress, fall, head injury, or any other complaints.

## 2023-01-07 NOTE — ED PROVIDER NOTE - CLINICAL SUMMARY MEDICAL DECISION MAKING FREE TEXT BOX
72-year-old male with history of hypertension, hyperlipidemia, traumatic brain injury, CAD, A. fib on apixaban, brought in from home for fever, cough.  Patient also more lethargic than usual over past 1 to 2 days.  No abdominal pain.  No vomiting or diarrhea.  No aggravating or alleviating factors otherwise noted.  No other acute complaints at this time.  Limited history secondary to patient's prior TBI.  Exam: Nontoxic, well-appearing.  Normal respiratory effort.  CTA BL, no W/R/R.  Abdomen soft, nontender, nondistended.  No HSM.  No CVAT.  Normal nonfocal neuro exam.  No C/C/C.  No other acute findings.  Acute AMS with fever.  Check labs, x-ray, flu/COVID/RSV, IV fluids, CT rule out acute ICH or other acute pathology, likely admission.

## 2023-01-07 NOTE — ED ADULT NURSE NOTE - NSIMPLEMENTINTERV_GEN_ALL_ED
Implemented All Fall Risk Interventions:  [place] to call system. Call bell, personal items and telephone within reach. Instruct patient to call for assistance. Room bathroom lighting operational. Non-slip footwear when patient is off stretcher. Physically safe environment: no spills, clutter or unnecessary equipment. Stretcher in lowest position, wheels locked, appropriate side rails in place. Provide visual cue, wrist band, yellow gown, etc. Monitor gait and stability. Monitor for mental status changes and reorient to person, place, and time. Review medications for side effects contributing to fall risk. Reinforce activity limits and safety measures with patient and family.

## 2023-01-07 NOTE — H&P ADULT - ATTENDING COMMENTS
71 y/o M with PMH of HTN, HLD, CAD, TBI, atrial fibrillation (on apixaban) BIBA from home for fever, cough, lethargy w/ sick contact admitted for the management of metabolic encephalopathy 2/2 Influenza infection    Supportive care for influenza, IVF NS, tylenol, robitussin, tessalon perles, albuterol, flonase prn, cepachol lozenge.  Rapid a fib may be related to influenza, care d/w Cards Dr. Barton, pt started on cardizem 60mg po q6, will cont eliquis and monitor on tele.    Fahad Cooley, Attending Physician

## 2023-01-07 NOTE — H&P ADULT - PROBLEM SELECTOR PLAN 2
EKG: Rate 118. Atrial fibrillation. Right ventricular hypertrophy. Left axis deviation. Anterior infarct  (noted on priors)  S/p cardizem 10 IVP in ED  Takes at home Cardizem 120 mg CD qd, will continue with holding parameters  Replete mag and phos as needed  Outpatient cardio Dr. Goldsmith EKG: Rate 118. Atrial fibrillation. Right ventricular hypertrophy. Left axis deviation. Anterior infarct  (noted on priors)  - S/p 1x Cardizem 10 IVP in ED and 10 mg PO   - Takes at home Cardizem 120 mg CD qd, will continue with holding parameters  - Replete mag and phos as needed  - Outpatient cardio Dr. Goldsmith EKG: Rate 118. Atrial fibrillation. Right ventricular hypertrophy. Left axis deviation. Anterior infarct  (noted on priors)  - Likely reactive in setting of Flu   - S/p 1x Cardizem 10 IVP in ED and 10 mg PO   - Takes at home Cardizem 120 mg CD qd, discussed with Cardiology and will increase to 240 mg -- 60 mg q6 hr   - Replete mag and phos as needed  - Outpatient cardio Dr. Goldsmith  - Cardiology, Dr. Barton  - Admit to tele EKG: Rate 118. Atrial fibrillation. Right ventricular hypertrophy. Left axis deviation. Anterior infarct  (noted on priors)  - Likely reactive in setting of Flu   - S/p 1x Cardizem 10 IVP in ED and 10 mg PO   - Takes at home Cardizem 120 mg CD qd, On  60 mg q6 hrs here   - Replete mag and phos as needed  - Outpatient cardio Dr. Goldsmith  - Cardiology, Dr. Barton  - Admit to tele

## 2023-01-07 NOTE — H&P ADULT - NSHPPHYSICALEXAM_GEN_ALL_CORE
T(C): 36.8 (01-07-23 @ 14:13), Max: 38.2 (01-07-23 @ 09:58)  HR: 105 (01-07-23 @ 14:41) (105 - 120)  BP: 153/91 (01-07-23 @ 14:41) (152/94 - 158/96)  RR: 18 (01-07-23 @ 14:41) (16 - 18)  SpO2: 96% (01-07-23 @ 14:41) (96% - 98%)    GENERAL: patient appears well, no acute distress, appropriate, pleasant  EYES: sclera clear, no exudates  ENMT: oropharynx clear without erythema, no exudates, moist mucous membranes  NECK: supple, soft, no thyromegaly noted  LUNGS: good air entry bilaterally, clear to auscultation, symmetric breath sounds, no wheezing or rhonchi appreciated  HEART: soft S1/S2, regular rate and rhythm, no murmurs noted, no lower extremity edema  GASTROINTESTINAL: abdomen is soft, nontender, nondistended, normoactive bowel sounds, no palpable masses  INTEGUMENT: good skin turgor, warm skin, appears well perfused  MUSCULOSKELETAL: no clubbing or cyanosis, no obvious deformity  NEUROLOGIC: awake, alert, oriented x3, good muscle tone in 4 extremities, no obvious sensory deficits  PSYCHIATRIC: mood is good, affect is congruent, linear and logical thought process  HEME/LYMPH: no palpable supraclavicular nodules, no obvious ecchymosis or petechiae T(C): 36.8 (01-07-23 @ 14:13), Max: 38.2 (01-07-23 @ 09:58)  HR: 105 (01-07-23 @ 14:41) (105 - 120)  BP: 153/91 (01-07-23 @ 14:41) (152/94 - 158/96)  RR: 18 (01-07-23 @ 14:41) (16 - 18)  SpO2: 96% (01-07-23 @ 14:41) (96% - 98%)    GENERAL: ill appearing man, contracted in bed, nodding to questioning   EYES: sclera clear, no exudates  ENMT: craniotomy scar appreciated on right temporal region of skull, moist mucous membranes  NECK: supple, soft, no thyromegaly noted  LUNGS: decreased breath sounds bilaterally, clear to auscultation bilaterally   HEART: Irregular rate and rhythm, no murmurs noted, b/l lower extremity edema +2 pitting appreciated   GASTROINTESTINAL: abdomen is soft, nontender, nondistended, normoactive bowel sounds, no palpable masses  INTEGUMENT: multiple healing skin wounds diffusely across upper and lower extremities   MUSCULOSKELETAL: no clubbing or cyanosis, no obvious deformity  NEUROLOGIC: awake and alert, yes and no answers exclusively, nonverbal otherwise   HEME/LYMPH: no palpable supraclavicular nodules

## 2023-01-08 LAB
ALBUMIN SERPL ELPH-MCNC: 2.8 G/DL — LOW (ref 3.3–5)
ALP SERPL-CCNC: 67 U/L — SIGNIFICANT CHANGE UP (ref 40–120)
ALT FLD-CCNC: 14 U/L — SIGNIFICANT CHANGE UP (ref 12–78)
ANION GAP SERPL CALC-SCNC: 10 MMOL/L — SIGNIFICANT CHANGE UP (ref 5–17)
AST SERPL-CCNC: 15 U/L — SIGNIFICANT CHANGE UP (ref 15–37)
BASOPHILS # BLD AUTO: 0.05 K/UL — SIGNIFICANT CHANGE UP (ref 0–0.2)
BASOPHILS NFR BLD AUTO: 0.6 % — SIGNIFICANT CHANGE UP (ref 0–2)
BILIRUB SERPL-MCNC: 0.5 MG/DL — SIGNIFICANT CHANGE UP (ref 0.2–1.2)
BUN SERPL-MCNC: 8 MG/DL — SIGNIFICANT CHANGE UP (ref 7–23)
CALCIUM SERPL-MCNC: 8.2 MG/DL — LOW (ref 8.5–10.1)
CHLORIDE SERPL-SCNC: 107 MMOL/L — SIGNIFICANT CHANGE UP (ref 96–108)
CO2 SERPL-SCNC: 25 MMOL/L — SIGNIFICANT CHANGE UP (ref 22–31)
CREAT SERPL-MCNC: 0.77 MG/DL — SIGNIFICANT CHANGE UP (ref 0.5–1.3)
CULTURE RESULTS: SIGNIFICANT CHANGE UP
EGFR: 96 ML/MIN/1.73M2 — SIGNIFICANT CHANGE UP
EOSINOPHIL # BLD AUTO: 0.02 K/UL — SIGNIFICANT CHANGE UP (ref 0–0.5)
EOSINOPHIL NFR BLD AUTO: 0.2 % — SIGNIFICANT CHANGE UP (ref 0–6)
GLUCOSE SERPL-MCNC: 102 MG/DL — HIGH (ref 70–99)
HCT VFR BLD CALC: 37.5 % — LOW (ref 39–50)
HGB BLD-MCNC: 11.9 G/DL — LOW (ref 13–17)
IMM GRANULOCYTES NFR BLD AUTO: 0.6 % — SIGNIFICANT CHANGE UP (ref 0–0.9)
LYMPHOCYTES # BLD AUTO: 0.67 K/UL — LOW (ref 1–3.3)
LYMPHOCYTES # BLD AUTO: 7.7 % — LOW (ref 13–44)
MCHC RBC-ENTMCNC: 26.6 PG — LOW (ref 27–34)
MCHC RBC-ENTMCNC: 31.7 GM/DL — LOW (ref 32–36)
MCV RBC AUTO: 83.7 FL — SIGNIFICANT CHANGE UP (ref 80–100)
MONOCYTES # BLD AUTO: 0.69 K/UL — SIGNIFICANT CHANGE UP (ref 0–0.9)
MONOCYTES NFR BLD AUTO: 8 % — SIGNIFICANT CHANGE UP (ref 2–14)
NEUTROPHILS # BLD AUTO: 7.19 K/UL — SIGNIFICANT CHANGE UP (ref 1.8–7.4)
NEUTROPHILS NFR BLD AUTO: 82.9 % — HIGH (ref 43–77)
NRBC # BLD: 0 /100 WBCS — SIGNIFICANT CHANGE UP (ref 0–0)
PLATELET # BLD AUTO: 171 K/UL — SIGNIFICANT CHANGE UP (ref 150–400)
POTASSIUM SERPL-MCNC: 3.6 MMOL/L — SIGNIFICANT CHANGE UP (ref 3.5–5.3)
POTASSIUM SERPL-SCNC: 3.6 MMOL/L — SIGNIFICANT CHANGE UP (ref 3.5–5.3)
PROT SERPL-MCNC: 5.9 G/DL — LOW (ref 6–8.3)
RBC # BLD: 4.48 M/UL — SIGNIFICANT CHANGE UP (ref 4.2–5.8)
RBC # FLD: 18.2 % — HIGH (ref 10.3–14.5)
SODIUM SERPL-SCNC: 142 MMOL/L — SIGNIFICANT CHANGE UP (ref 135–145)
SPECIMEN SOURCE: SIGNIFICANT CHANGE UP
WBC # BLD: 8.67 K/UL — SIGNIFICANT CHANGE UP (ref 3.8–10.5)
WBC # FLD AUTO: 8.67 K/UL — SIGNIFICANT CHANGE UP (ref 3.8–10.5)

## 2023-01-08 PROCEDURE — 99233 SBSQ HOSP IP/OBS HIGH 50: CPT

## 2023-01-08 PROCEDURE — 99223 1ST HOSP IP/OBS HIGH 75: CPT

## 2023-01-08 RX ORDER — APIXABAN 2.5 MG/1
5 TABLET, FILM COATED ORAL EVERY 12 HOURS
Refills: 0 | Status: DISCONTINUED | OUTPATIENT
Start: 2023-01-08 | End: 2023-01-10

## 2023-01-08 RX ORDER — DILTIAZEM HCL 120 MG
10 CAPSULE, EXT RELEASE 24 HR ORAL ONCE
Refills: 0 | Status: COMPLETED | OUTPATIENT
Start: 2023-01-08 | End: 2023-01-08

## 2023-01-08 RX ADMIN — Medication 75 MILLIGRAM(S): at 05:27

## 2023-01-08 RX ADMIN — Medication 200 MILLIGRAM(S): at 18:08

## 2023-01-08 RX ADMIN — Medication 10 MILLIGRAM(S): at 10:06

## 2023-01-08 RX ADMIN — Medication 60 MILLIGRAM(S): at 05:26

## 2023-01-08 RX ADMIN — APIXABAN 5 MILLIGRAM(S): 2.5 TABLET, FILM COATED ORAL at 18:06

## 2023-01-08 RX ADMIN — Medication 10 MILLIGRAM(S): at 14:12

## 2023-01-08 RX ADMIN — Medication 1 SPRAY(S): at 05:26

## 2023-01-08 RX ADMIN — ALBUTEROL 2 PUFF(S): 90 AEROSOL, METERED ORAL at 00:47

## 2023-01-08 RX ADMIN — Medication 1 SPRAY(S): at 18:21

## 2023-01-08 RX ADMIN — APIXABAN 2.5 MILLIGRAM(S): 2.5 TABLET, FILM COATED ORAL at 05:27

## 2023-01-08 RX ADMIN — Medication 75 MILLIGRAM(S): at 18:20

## 2023-01-08 RX ADMIN — LOSARTAN POTASSIUM 50 MILLIGRAM(S): 100 TABLET, FILM COATED ORAL at 05:27

## 2023-01-08 RX ADMIN — Medication 1: at 11:32

## 2023-01-08 RX ADMIN — Medication 60 MILLIGRAM(S): at 18:05

## 2023-01-08 RX ADMIN — Medication 200 MILLIGRAM(S): at 05:26

## 2023-01-08 RX ADMIN — Medication 1 SPRAY(S): at 00:41

## 2023-01-08 RX ADMIN — Medication 50 MILLIGRAM(S): at 05:26

## 2023-01-08 RX ADMIN — Medication 1 SPRAY(S): at 13:26

## 2023-01-08 RX ADMIN — LOSARTAN POTASSIUM 50 MILLIGRAM(S): 100 TABLET, FILM COATED ORAL at 18:06

## 2023-01-08 RX ADMIN — GABAPENTIN 300 MILLIGRAM(S): 400 CAPSULE ORAL at 22:40

## 2023-01-08 RX ADMIN — ALBUTEROL 2 PUFF(S): 90 AEROSOL, METERED ORAL at 07:39

## 2023-01-08 RX ADMIN — Medication 60 MILLIGRAM(S): at 00:41

## 2023-01-08 RX ADMIN — LATANOPROST 1 DROP(S): 0.05 SOLUTION/ DROPS OPHTHALMIC; TOPICAL at 22:40

## 2023-01-08 RX ADMIN — Medication 200 MILLIGRAM(S): at 00:41

## 2023-01-08 RX ADMIN — GABAPENTIN 300 MILLIGRAM(S): 400 CAPSULE ORAL at 05:26

## 2023-01-08 RX ADMIN — MIRTAZAPINE 7.5 MILLIGRAM(S): 45 TABLET, ORALLY DISINTEGRATING ORAL at 22:40

## 2023-01-08 NOTE — PHYSICAL THERAPY INITIAL EVALUATION ADULT - MANUAL MUSCLE TESTING RESULTS, REHAB EVAL
bilateral shoulder flexion/elbow flexion grossly 3-/5, bilateral wrist/hands grossly 2/5; no active movement to bilateral LEs

## 2023-01-08 NOTE — ED ADULT NURSE REASSESSMENT NOTE - NS ED NURSE REASSESS COMMENT FT1
pt resting comfortably in bed, denies complaints at this time. nsr on cardiac monitor. respirations even and unlabored. pt remains on 2L o2 nc satting 98% at this time. pt in NAD. fs as noted in results. will continue to monitor.

## 2023-01-08 NOTE — PHYSICAL THERAPY INITIAL EVALUATION ADULT - DIAGNOSIS, PT EVAL
Patient presents with weakness, impaired balance, decreased ROM resulting in impaired functional mobility leading to increased risk for falls.

## 2023-01-08 NOTE — CHART NOTE - NSCHARTNOTEFT_GEN_A_CORE
Spoke to patient's wife at bedside at length. All questions answered and concerns addressed. She agreed with the plan

## 2023-01-08 NOTE — PHYSICAL THERAPY INITIAL EVALUATION ADULT - PATIENT PROFILE REVIEW, REHAB EVAL
PT orders received: ambulate with assistance. Consult with VERNON Nelson, pt may participate in PT evaluation./yes

## 2023-01-08 NOTE — PATIENT PROFILE ADULT - FALL HARM RISK - UNIVERSAL INTERVENTIONS
Bed in lowest position, wheels locked, appropriate side rails in place/Call bell, personal items and telephone in reach/Instruct patient to call for assistance before getting out of bed or chair/Non-slip footwear when patient is out of bed/[place] to call system/Physically safe environment - no spills, clutter or unnecessary equipment/Purposeful Proactive Rounding/Room/bathroom lighting operational, light cord in reach

## 2023-01-08 NOTE — CHART NOTE - NSCHARTNOTEFT_GEN_A_CORE
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 RLE with no evidence of DVT. Patient currently anticoagulated with Eliquis 2.5mg BID (hx of afib, DVTs). Patient with hx multiple DVTs, and recent brain bleed (SDH) in 10/2022, recently discharged from [facility] ~1mo ago, was noted to have LLE DVT during that admission as well and was discharged on Eliquis 5mg BID. 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 RLE with no evidence of DVT. Patient currently anticoagulated with Eliquis 2.5mg BID (hx of afib, DVTs). Patient with hx multiple DVTs, and recent brain bleed (SDH) in 10/2022, recently discharged from [facility] ~1mo ago, was noted to have LLE DVT during that admission as well and was discharged on Eliquis. In setting of recent SDH will hold off on full dose Lovenox, continue Eliquis as ordered.

## 2023-01-08 NOTE — PHYSICAL THERAPY INITIAL EVALUATION ADULT - LEVEL OF INDEPENDENCE, REHAB EVAL
pt with increased tone and pain with movement; pt bedbound/wheelchair bound and requires eladio lift for transfers./unable to perform

## 2023-01-08 NOTE — PROGRESS NOTE ADULT - SUBJECTIVE AND OBJECTIVE BOX
Patient is a 72y old  Male who presents with a chief complaint of New onset afib (07 Jan 2023 17:13)       INTERVAL HPI/OVERNIGHT EVENTS: Seen and examined at bedside. Denies chest pain, palpitation.     MEDICATIONS  (STANDING):  albuterol    90 MICROgram(s) HFA Inhaler 2 Puff(s) Inhalation every 6 hours  apixaban 2.5 milliGRAM(s) Oral every 12 hours  benzocaine 20% Spray 1 Spray(s) Topical every 6 hours  dextrose 5%. 1000 milliLiter(s) (100 mL/Hr) IV Continuous <Continuous>  dextrose 5%. 1000 milliLiter(s) (50 mL/Hr) IV Continuous <Continuous>  dextrose 50% Injectable 25 Gram(s) IV Push once  dextrose 50% Injectable 12.5 Gram(s) IV Push once  dextrose 50% Injectable 25 Gram(s) IV Push once  diltiazem    Tablet 60 milliGRAM(s) Oral every 6 hours  gabapentin 300 milliGRAM(s) Oral three times a day  glucagon  Injectable 1 milliGRAM(s) IntraMuscular once  guaiFENesin Oral Liquid (Sugar-Free) 200 milliGRAM(s) Oral every 6 hours  hydrALAZINE 50 milliGRAM(s) Oral three times a day  insulin lispro (ADMELOG) corrective regimen sliding scale   SubCutaneous three times a day before meals  insulin lispro (ADMELOG) corrective regimen sliding scale   SubCutaneous at bedtime  latanoprost 0.005% Ophthalmic Solution 1 Drop(s) Both EYES at bedtime  losartan 50 milliGRAM(s) Oral two times a day  methylphenidate 10 milliGRAM(s) Oral <User Schedule>  mirtazapine 7.5 milliGRAM(s) Oral at bedtime  oseltamivir 75 milliGRAM(s) Oral two times a day  sodium chloride 0.9%. 1000 milliLiter(s) (75 mL/Hr) IV Continuous <Continuous>    MEDICATIONS  (PRN):  acetaminophen     Tablet .. 650 milliGRAM(s) Oral every 6 hours PRN Temp greater or equal to 38C (100.4F), Mild Pain (1 - 3)  benzonatate 100 milliGRAM(s) Oral every 8 hours PRN Cough  dextrose Oral Gel 15 Gram(s) Oral once PRN Blood Glucose LESS THAN 70 milliGRAM(s)/deciliter  fluticasone propionate 50 MICROgram(s)/spray Nasal Spray 1 Spray(s) Both Nostrils two times a day PRN nasal congestion      Allergies    No Known Allergies    Intolerances        REVIEW OF SYSTEMS:  CONSTITUTIONAL: No fever, + Gen weakness   EYES: No eye pain, visual disturbances, or discharge  ENMT:  No difficulty hearing, tinnitus, vertigo; No sinus or throat pain  NECK: No pain or stiffness  RESPIRATORY: No cough, wheezing, chills or hemoptysis; + shortness of breath  CARDIOVASCULAR: No chest pain, palpitations, dizziness, or leg swelling  GASTROINTESTINAL: No abdominal or epigastric pain. No nausea, vomiting, or hematemesis; No diarrhea or constipation.   GENITOURINARY: No dysuria, frequency, hematuria, or incontinence  NEUROLOGICAL: No headaches, loss of strength, numbness, or tremors  SKIN: No itching, burning, rashes, or lesions   LYMPH NODES: No enlarged glands  MUSCULOSKELETAL: No joint pain or swelling; No muscle, back, or extremity pain  HEME/LYMPH: No easy bruising, or bleeding gums  ALLERGY AND IMMUNOLOGIC: No hives or eczema    Vital Signs Last 24 Hrs  T(C): 36.8 (08 Jan 2023 05:32), Max: 38.2 (07 Jan 2023 09:58)  T(F): 98.2 (08 Jan 2023 05:32), Max: 100.8 (07 Jan 2023 09:58)  HR: 114 (08 Jan 2023 05:32) (103 - 125)  BP: 135/80 (08 Jan 2023 05:32) (133/97 - 169/94)  BP(mean): --  RR: 18 (08 Jan 2023 05:32) (16 - 18)  SpO2: 100% (08 Jan 2023 05:32) (96% - 100%)    Parameters below as of 08 Jan 2023 05:32  Patient On (Oxygen Delivery Method): nasal cannula  O2 Flow (L/min): 2      PHYSICAL EXAM:  GENERAL: NAD, Awake, Alert.   HEAD:  Atraumatic, Normocephalic  EYES: EOMI, PERRLA, conjunctiva and sclera clear  ENMT: No tonsillar erythema, exudates, or enlargement; Moist mucous membranes  NECK: Supple, No JVD, Normal thyroid  NERVOUS SYSTEM:  Alert & Oriented X3, No focal motor/sensory deficits noted   CHEST/LUNG: Decreased bibasilar Breath sounds, no wheezing   HEART: S1S2+, irregular   ABDOMEN: Soft, Nontender, Nondistended; Bowel sounds present  EXTREMITIES:  2+ Peripheral Pulses, No clubbing, cyanosis  LYMPH: No lymphadenopathy noted  SKIN: No rashes or lesions    LABS:                        11.9   8.67  )-----------( 171      ( 08 Jan 2023 06:09 )             37.5     08 Jan 2023 06:09    142    |  107    |  8      ----------------------------<  102    3.6     |  25     |  0.77     Ca    8.2        08 Jan 2023 06:09    TPro  5.9    /  Alb  2.8    /  TBili  0.5    /  DBili  x      /  AST  15     /  ALT  14     /  AlkPhos  67     08 Jan 2023 06:09    PT/INR - ( 07 Jan 2023 10:45 )   PT: 20.1 sec;   INR: 1.71 ratio         PTT - ( 07 Jan 2023 10:45 )  PTT:27.0 sec  CAPILLARY BLOOD GLUCOSE      POCT Blood Glucose.: 106 mg/dL (08 Jan 2023 07:33)  POCT Blood Glucose.: 121 mg/dL (07 Jan 2023 22:01)    BLOOD CULTURE    RADIOLOGY & ADDITIONAL TESTS:    Imaging Personally Reviewed:  [ ] YES     Consultant(s) Notes Reviewed:      Care Discussed with Consultants/Other Providers:

## 2023-01-08 NOTE — PHYSICAL THERAPY INITIAL EVALUATION ADULT - NSPTDISCHREC_GEN_A_CORE
No skilled PT needs pt bedbound/wheelchair bound and requires eladio lift for transfers. Patient appears to be at functional baseline. Patient will not be placed on PT program./No skilled PT needs

## 2023-01-08 NOTE — ED ADULT NURSE REASSESSMENT NOTE - NS ED NURSE REASSESS COMMENT FT1
pt refusing to take PO meds at this time. MD Pruitt made aware. IV cardizem administered at this time as per MD orders. pt in NAD. remains on 2L o2 nc satting 96%. will continue to monitor.

## 2023-01-08 NOTE — PHYSICAL THERAPY INITIAL EVALUATION ADULT - ADDITIONAL COMMENTS
Per EMR Pt lives in a private house +1 step to enter. Lives with spouse, niece and 24/7 HHA. All needs met on main floor. PTA, pt was non-ambulatory and was wheelchair bound. Pt owns W/C, eladio lift device and hospital bed Per EMR Pt lives in a private house +1 step to enter. Pt Lives with spouse. All needs met on main floor. PTA, pt was non-ambulatory and was wheelchair bound. Pt owns W/C, eladio lift device and hospital bed. Per caregiver at bedside pt has home health aide Monday-Friday 10 hours, Sat/Sun 8 hours.

## 2023-01-08 NOTE — CONSULT NOTE ADULT - SUBJECTIVE AND OBJECTIVE BOX
[facility] -  Coco Cruz Patel, Savella  Office Number: [phone]    Initial Consult Note    CHIEF COMPLAINT: Patient is a 72y old  Male who presents with a chief complaint of New onset afib (08 Jan 2023 09:40)      HPI:  71 y/o M with past medical history of hypertension, hyperlipidemia,, TBI, subdural hematoma s/p craniotomy in August 2021, atrial fibrillation, LLE DVT  brought in by ambulance from home due to fever and cough.  Patient was found to be feverish and lethargic brought to ed for further evaluation. . At baseline, pt is disabled since his TBI in August 2021. Pt was found to be Flu+ in the ED . Patient is unable to provide any meaningful information.         IN THE ED:  Temp   100.8 F ,  , /96 /  ,RR 18 , SpO2 97% on RA  S/P tyelnol x 1, diltiazem IVP 10mg x 1, rocephin x 1  EKG: Rate 118. Atrial fibrillation. Right ventricular hypertrophy. Left axis deviation. Anterior infarct (cited on or before 22-MAY-2022)  Labs significant for: Influenza A +. BNP 1424.  Imaging:   CXR - wet read - clear CXR  CT chest - No airspace opacities or pleural effusions  CT Head - Old infarcts as described above. Previously noted left-sided subdural collections are no longer seen. Previously noted subdural collection along the posterior and hemispheric region on the right side is decrease in size. Previously noted acute subdural hematoma along the demonstrate region is less conspicuous. (07 Jan 2023 17:13)      PAST MEDICAL & SURGICAL HISTORY:  TBI (traumatic brain injury)      HTN (hypertension)      Atrial fibrillation      Peripheral neuropathy      Major depression      HLD (hyperlipidemia)      CAD (coronary artery disease)      BPH (benign prostatic hyperplasia)      Pneumonia due to COVID-19 virus  June 2022      Hemorrhage in the brain      H/O craniotomy          SOCIAL HISTORY:  No tobacco, ethanol, or drug abuse.    FAMILY HISTORY:  FH: HTN (hypertension) (Father, Mother, Sibling)    Family history of bone cancer (Sibling)    FH: Alzheimers disease (Sibling)      No family history of acute MI or sudden cardiac death.    MEDICATIONS  (STANDING):  albuterol    90 MICROgram(s) HFA Inhaler 2 Puff(s) Inhalation every 6 hours  apixaban 2.5 milliGRAM(s) Oral every 12 hours  benzocaine 20% Spray 1 Spray(s) Topical every 6 hours  dextrose 5%. 1000 milliLiter(s) (100 mL/Hr) IV Continuous <Continuous>  dextrose 5%. 1000 milliLiter(s) (50 mL/Hr) IV Continuous <Continuous>  dextrose 50% Injectable 25 Gram(s) IV Push once  dextrose 50% Injectable 12.5 Gram(s) IV Push once  dextrose 50% Injectable 25 Gram(s) IV Push once  diltiazem    Tablet 60 milliGRAM(s) Oral every 6 hours  gabapentin 300 milliGRAM(s) Oral three times a day  glucagon  Injectable 1 milliGRAM(s) IntraMuscular once  guaiFENesin Oral Liquid (Sugar-Free) 200 milliGRAM(s) Oral every 6 hours  hydrALAZINE 50 milliGRAM(s) Oral three times a day  insulin lispro (ADMELOG) corrective regimen sliding scale   SubCutaneous three times a day before meals  insulin lispro (ADMELOG) corrective regimen sliding scale   SubCutaneous at bedtime  latanoprost 0.005% Ophthalmic Solution 1 Drop(s) Both EYES at bedtime  losartan 50 milliGRAM(s) Oral two times a day  methylphenidate 10 milliGRAM(s) Oral <User Schedule>  mirtazapine 7.5 milliGRAM(s) Oral at bedtime  oseltamivir 75 milliGRAM(s) Oral two times a day  sodium chloride 0.9%. 1000 milliLiter(s) (75 mL/Hr) IV Continuous <Continuous>    MEDICATIONS  (PRN):  acetaminophen     Tablet .. 650 milliGRAM(s) Oral every 6 hours PRN Temp greater or equal to 38C (100.4F), Mild Pain (1 - 3)  benzonatate 100 milliGRAM(s) Oral every 8 hours PRN Cough  dextrose Oral Gel 15 Gram(s) Oral once PRN Blood Glucose LESS THAN 70 milliGRAM(s)/deciliter  fluticasone propionate 50 MICROgram(s)/spray Nasal Spray 1 Spray(s) Both Nostrils two times a day PRN nasal congestion      Allergies    No Known Allergies    Intolerances        REVIEW OF SYSTEMS:    CONSTITUTIONAL: No weakness, fevers or chills    All other review of systems is negative unless indicated above    VITAL SIGNS:   Vital Signs Last 24 Hrs  T(C): 36.7 (08 Jan 2023 11:43), Max: 37.5 (07 Jan 2023 19:54)  T(F): 98 (08 Jan 2023 11:43), Max: 99.5 (07 Jan 2023 19:54)  HR: 115 (08 Jan 2023 11:43) (103 - 125)  BP: 167/106 (08 Jan 2023 11:43) (133/97 - 169/94)  BP(mean): --  RR: 18 (08 Jan 2023 11:43) (16 - 18)  SpO2: 98% (08 Jan 2023 11:43) (96% - 100%)    Parameters below as of 08 Jan 2023 11:43  Patient On (Oxygen Delivery Method): nasal cannula  O2 Flow (L/min): 2      I&O's Summary      On Exam:    Constitutional: lethargic frail   Lungs:  Non-labored, breath sounds are clear bilaterally, No wheezing, rales or rhonchi  Cardiovascular: IRRR.  S1 and S2 positive.  No murmurs, rubs, gallops or clicks  Gastrointestinal: Bowel Sounds present, soft, nontender.   Lymph: No peripheral edema. No cervical lymphadenopathy.  Neurological: Alert, no focal deficits  Skin: No rashes or ulcers   Psych:  Mood & affect appropriate.    LABS: All Labs Reviewed:                        11.9   8.67  )-----------( 171      ( 08 Jan 2023 06:09 )             37.5                         12.9   7.92  )-----------( 156      ( 07 Jan 2023 10:45 )             40.3     08 Jan 2023 06:09    142    |  107    |  8      ----------------------------<  102    3.6     |  25     |  0.77   07 Jan 2023 10:45    142    |  109    |  10     ----------------------------<  121    4.3     |  27     |  0.95     Ca    8.2        08 Jan 2023 06:09  Ca    9.0        07 Jan 2023 10:45    TPro  5.9    /  Alb  2.8    /  TBili  0.5    /  DBili  x      /  AST  15     /  ALT  14     /  AlkPhos  67     08 Jan 2023 06:09  TPro  6.7    /  Alb  3.1    /  TBili  0.6    /  DBili  x      /  AST  18     /  ALT  16     /  AlkPhos  81     07 Jan 2023 10:45    PT/INR - ( 07 Jan 2023 10:45 )   PT: 20.1 sec;   INR: 1.71 ratio         PTT - ( 07 Jan 2023 10:45 )  PTT:27.0 sec      Blood Culture:   01-07 @ 10:45  Pro Bnp 1424        RADIOLOGY:    EKG:    Patient name: MELBA PARMAR  YOB: 1950   Age: 72 (M)   MR#: [id number]  Study Date: 10/20/2022  [facility]  Study quality: Technically difficult  Referring Physician: Talat Royal MD  Blood Pressure: 148/105 mmHg  Height: 165 cm  Weight: 68 kg  BSA: 1.8 m2  Heart Rhythm: Atrial fibrillation  ------------------------------------------------------------------------  PROCEDURE: Transthoracic echocardiogram with 2-D, M-Mode  and complete spectral and color flow Doppler. Verbal  consent was obtained for injection of  Ultrasonic Enhancing  Agent following a discussion of risks and benefits.  Following intravenous injection of Ultrasonic Enhancing  Agent, harmonic imaging was performed.  INDICATION: Other cerebrovascular disease (I67.89)  ------------------------------------------------------------------------  Dimensions:    Normal Values:  LA:     4.6    2.0 - 4.0 cm  Ao:     3.8    2.0 - 3.8 cm  SEPTUM: 1.2    0.6 - 1.2 cm  PWT:1.0    0.6 - 1.1 cm  LVIDd:  3.8    3.0 - 5.6 cm  LVIDs:  2.5    1.8 - 4.0 cm  Derived variables:  LVMI: 77 g/m2  RWT: 0.52  EF (Visual Estimate): 65 %  Doppler Peak Velocity (m/sec): AoV=0.9  ------------------------------------------------------------------------  Observations:  Mitral Valve: Normal mitral valve.  Aortic Valve/Aorta: Normal aortic valve. .  Normal aortic root size.  Left Atrium: Normal left atrium.  Left Ventricle: Endocardial visualization enhanced with  intravenous injection of Ultrasonic Enhancing Agent  (Definity).  Normal left ventricular internal dimensions and wall  thicknesses.  Normal left ventricular systolic function. No segmental  wall motion abnormalities.  Right Heart: Normal right atrium. Normal right ventricular  size and function.  Normal tricuspid valve. Normal pulmonic valve.  Pericardium/Pleura: Normal pericardium with no pericardial  effusion.  Hemodynamic: No evidence of pulmoanry hypertension.  ------------------------------------------------------------------------  Conclusions:  Endocardial visualization enhanced with intravenous  injection of Ultrasonic Enhancing Agent (Definity).  Normal left ventricular systolic function. No segmental  wall motion abnormalities.  ------------------------------------------------------------------------  Confirmed on  10/20/2022 - 15:27:02 by Luis Fernando Gutierrez MD, FASE  ------------------------------------------------------------------------         [facility] -  Coco Cruz Patel, Savella  Office Number: [phone]    Initial Consult Note    CHIEF COMPLAINT: Patient is a 72y old  Male who presents with a chief complaint of New onset afib (08 Jan 2023 09:40)      HPI:  73 y/o M with past medical history of hypertension, hyperlipidemia,, TBI, subdural hematoma s/p craniotomy in August 2021, atrial fibrillation, LLE DVT  brought in by ambulance from home due to fever and cough.  Patient was found to be feverish and lethargic brought to ed for further evaluation. . At baseline, pt is disabled since his TBI in August 2021. Pt was found to be Flu+ in the ED . Patient is unable to provide any meaningful information.         IN THE ED:  Temp   100.8 F ,  , /96 /  ,RR 18 , SpO2 97% on RA  S/P tyelnol x 1, diltiazem IVP 10mg x 1, rocephin x 1  EKG: Rate 118. Atrial fibrillation. Right ventricular hypertrophy. Left axis deviation. Anterior infarct (cited on or before 22-MAY-2022)  Labs significant for: Influenza A +. BNP 1424.    CT chest - No airspace opacities or pleural effusions  CT Head - Old infarcts as described above. Previously noted left-sided subdural collections are no longer seen. Previously noted subdural collection along the posterior and hemispheric region on the right side is decrease in size. Previously noted acute subdural hematoma along the demonstrate region is less conspicuous. (07 Jan 2023 17:13)      PAST MEDICAL & SURGICAL HISTORY:  TBI (traumatic brain injury)      HTN (hypertension)      Atrial fibrillation      Peripheral neuropathy      Major depression      HLD (hyperlipidemia)      CAD (coronary artery disease)      BPH (benign prostatic hyperplasia)      Pneumonia due to COVID-19 virus  June 2022      Hemorrhage in the brain      H/O craniotomy          SOCIAL HISTORY:  No tobacco, ethanol, or drug abuse.    FAMILY HISTORY:  FH: HTN (hypertension) (Father, Mother, Sibling)    Family history of bone cancer (Sibling)    FH: Alzheimers disease (Sibling)      No family history of acute MI or sudden cardiac death.    MEDICATIONS  (STANDING):  albuterol    90 MICROgram(s) HFA Inhaler 2 Puff(s) Inhalation every 6 hours  apixaban 2.5 milliGRAM(s) Oral every 12 hours  benzocaine 20% Spray 1 Spray(s) Topical every 6 hours  dextrose 5%. 1000 milliLiter(s) (100 mL/Hr) IV Continuous <Continuous>  dextrose 5%. 1000 milliLiter(s) (50 mL/Hr) IV Continuous <Continuous>  dextrose 50% Injectable 25 Gram(s) IV Push once  dextrose 50% Injectable 12.5 Gram(s) IV Push once  dextrose 50% Injectable 25 Gram(s) IV Push once  diltiazem    Tablet 60 milliGRAM(s) Oral every 6 hours  gabapentin 300 milliGRAM(s) Oral three times a day  glucagon  Injectable 1 milliGRAM(s) IntraMuscular once  guaiFENesin Oral Liquid (Sugar-Free) 200 milliGRAM(s) Oral every 6 hours  hydrALAZINE 50 milliGRAM(s) Oral three times a day  insulin lispro (ADMELOG) corrective regimen sliding scale   SubCutaneous three times a day before meals  insulin lispro (ADMELOG) corrective regimen sliding scale   SubCutaneous at bedtime  latanoprost 0.005% Ophthalmic Solution 1 Drop(s) Both EYES at bedtime  losartan 50 milliGRAM(s) Oral two times a day  methylphenidate 10 milliGRAM(s) Oral <User Schedule>  mirtazapine 7.5 milliGRAM(s) Oral at bedtime  oseltamivir 75 milliGRAM(s) Oral two times a day  sodium chloride 0.9%. 1000 milliLiter(s) (75 mL/Hr) IV Continuous <Continuous>    MEDICATIONS  (PRN):  acetaminophen     Tablet .. 650 milliGRAM(s) Oral every 6 hours PRN Temp greater or equal to 38C (100.4F), Mild Pain (1 - 3)  benzonatate 100 milliGRAM(s) Oral every 8 hours PRN Cough  dextrose Oral Gel 15 Gram(s) Oral once PRN Blood Glucose LESS THAN 70 milliGRAM(s)/deciliter  fluticasone propionate 50 MICROgram(s)/spray Nasal Spray 1 Spray(s) Both Nostrils two times a day PRN nasal congestion      Allergies    No Known Allergies    Intolerances        REVIEW OF SYSTEMS:    CONSTITUTIONAL: No weakness, fevers or chills    All other review of systems is negative unless indicated above    VITAL SIGNS:   Vital Signs Last 24 Hrs  T(C): 36.7 (08 Jan 2023 11:43), Max: 37.5 (07 Jan 2023 19:54)  T(F): 98 (08 Jan 2023 11:43), Max: 99.5 (07 Jan 2023 19:54)  HR: 115 (08 Jan 2023 11:43) (103 - 125)  BP: 167/106 (08 Jan 2023 11:43) (133/97 - 169/94)  BP(mean): --  RR: 18 (08 Jan 2023 11:43) (16 - 18)  SpO2: 98% (08 Jan 2023 11:43) (96% - 100%)    Parameters below as of 08 Jan 2023 11:43  Patient On (Oxygen Delivery Method): nasal cannula  O2 Flow (L/min): 2      I&O's Summary      On Exam:    Constitutional: lethargic frail   Lungs:  Non-labored, breath sounds are coarse   Cardiovascular: IRRR. tachy  S1 and S2 positive.  No murmurs, rubs, gallops or clicks  Gastrointestinal: Bowel Sounds present, soft, nontender.   Lymph: No peripheral edema. No cervical lymphadenopathy.  Neurological: Alert, no focal deficits  Skin: No rashes or ulcers   Psych:  Mood & affect appropriate.    LABS: All Labs Reviewed:                        11.9   8.67  )-----------( 171      ( 08 Jan 2023 06:09 )             37.5                         12.9   7.92  )-----------( 156      ( 07 Jan 2023 10:45 )             40.3     08 Jan 2023 06:09    142    |  107    |  8      ----------------------------<  102    3.6     |  25     |  0.77   07 Jan 2023 10:45    142    |  109    |  10     ----------------------------<  121    4.3     |  27     |  0.95     Ca    8.2        08 Jan 2023 06:09  Ca    9.0        07 Jan 2023 10:45    TPro  5.9    /  Alb  2.8    /  TBili  0.5    /  DBili  x      /  AST  15     /  ALT  14     /  AlkPhos  67     08 Jan 2023 06:09  TPro  6.7    /  Alb  3.1    /  TBili  0.6    /  DBili  x      /  AST  18     /  ALT  16     /  AlkPhos  81     07 Jan 2023 10:45    PT/INR - ( 07 Jan 2023 10:45 )   PT: 20.1 sec;   INR: 1.71 ratio         PTT - ( 07 Jan 2023 10:45 )  PTT:27.0 sec      Blood Culture:   01-07 @ 10:45  Pro Bnp 1424        RADIOLOGY:    EKG:< from: 12 Lead ECG (01.07.23 @ 10:19) >    Ventricular Rate 118 BPM    QRS Duration 82 ms    Q-T Interval 266 ms    QTC Calculation(Bazett) 372 ms    R Axis 264 degrees    T Axis 36 degrees    Diagnosis Line Atrial fibrillation  Right ventricular hypertrophy  Left axis deviation  Anterior infarct (cited on or before 22-MAY-2022)  Abnormal ECG    < end of copied text >      Patient name: MELBA PARMAR  YOB: 1950   Age: 72 (M)   MR#: [id number]  Study Date: 10/20/2022  [facility] RDCS  Study quality: Technically difficult  Referring Physician: Talat Royal MD  Blood Pressure: 148/105 mmHg  Height: 165 cm  Weight: 68 kg  BSA: 1.8 m2  Heart Rhythm: Atrial fibrillation  ------------------------------------------------------------------------  PROCEDURE: Transthoracic echocardiogram with 2-D, M-Mode  and complete spectral and color flow Doppler. Verbal  consent was obtained for injection of  Ultrasonic Enhancing  Agent following a discussion of risks and benefits.  Following intravenous injection of Ultrasonic Enhancing  Agent, harmonic imaging was performed.  INDICATION: Other cerebrovascular disease (I67.89)  ------------------------------------------------------------------------  Dimensions:    Normal Values:  LA:     4.6    2.0 - 4.0 cm  Ao:     3.8    2.0 - 3.8 cm  SEPTUM: 1.2    0.6 - 1.2 cm  PWT:1.0    0.6 - 1.1 cm  LVIDd:  3.8    3.0 - 5.6 cm  LVIDs:  2.5    1.8 - 4.0 cm  Derived variables:  LVMI: 77 g/m2  RWT: 0.52  EF (Visual Estimate): 65 %  Doppler Peak Velocity (m/sec): AoV=0.9  ------------------------------------------------------------------------  Observations:  Mitral Valve: Normal mitral valve.  Aortic Valve/Aorta: Normal aortic valve. .  Normal aortic root size.  Left Atrium: Normal left atrium.  Left Ventricle: Endocardial visualization enhanced with  intravenous injection of Ultrasonic Enhancing Agent  (Definity).  Normal left ventricular internal dimensions and wall  thicknesses.  Normal left ventricular systolic function. No segmental  wall motion abnormalities.  Right Heart: Normal right atrium. Normal right ventricular  size and function.  Normal tricuspid valve. Normal pulmonic valve.  Pericardium/Pleura: Normal pericardium with no pericardial  effusion.  Hemodynamic: No evidence of pulmoanry hypertension.  ------------------------------------------------------------------------  Conclusions:  Endocardial visualization enhanced with intravenous  injection of Ultrasonic Enhancing Agent (Definity).  Normal left ventricular systolic function. No segmental  wall motion abnormalities.  ------------------------------------------------------------------------  Confirmed on  10/20/2022 - 15:27:02 by Luis Fernando Gutierrez MD, FASE  ------------------------------------------------------------------------         [facility] -  Coco Cruz Patel, Savella  Office Number: [phone]    Initial Consult Note    CHIEF COMPLAINT: Patient is a 72y old  Male who presents with a chief complaint of New onset afib (08 Jan 2023 09:40)      HPI:  73 y/o M with past medical history of hypertension, hyperlipidemia,, TBI, subdural hematoma s/p craniotomy in August 2021, atrial fibrillation, LLE DVT  brought in by ambulance from home due to fever and cough.  Patient was found to be feverish and lethargic brought to ed for further evaluation. . At baseline, pt is disabled since his TBI in August 2021. Pt was found to be Flu+ in the ED with persistent DVT. Patient is unable to provide any meaningful information.   Cardiology consulted for afib with rvr.         IN THE ED:  Temp   100.8 F ,  , /96 /  ,RR 18 , SpO2 97% on RA  S/P tyelnol x 1, diltiazem IVP 10mg x 1, rocephin x 1  EKG: Rate 118. Atrial fibrillation. Right ventricular hypertrophy. Left axis deviation. Anterior infarct (cited on or before 22-MAY-2022)  Labs significant for: Influenza A +. BNP 1424.    CT chest - No airspace opacities or pleural effusions  CT Head - Old infarcts as described above. Previously noted left-sided subdural collections are no longer seen. Previously noted subdural collection along the posterior and hemispheric region on the right side is decrease in size. Previously noted acute subdural hematoma along the demonstrate region is less conspicuous. (07 Jan 2023 17:13)      PAST MEDICAL & SURGICAL HISTORY:  TBI (traumatic brain injury)      HTN (hypertension)      Atrial fibrillation      Peripheral neuropathy      Major depression      HLD (hyperlipidemia)      CAD (coronary artery disease)      BPH (benign prostatic hyperplasia)      Pneumonia due to COVID-19 virus  June 2022      Hemorrhage in the brain      H/O craniotomy          SOCIAL HISTORY:  No tobacco, ethanol, or drug abuse.    FAMILY HISTORY:  FH: HTN (hypertension) (Father, Mother, Sibling)    Family history of bone cancer (Sibling)    FH: Alzheimers disease (Sibling)      No family history of acute MI or sudden cardiac death.    MEDICATIONS  (STANDING):  albuterol    90 MICROgram(s) HFA Inhaler 2 Puff(s) Inhalation every 6 hours  apixaban 2.5 milliGRAM(s) Oral every 12 hours  benzocaine 20% Spray 1 Spray(s) Topical every 6 hours  dextrose 5%. 1000 milliLiter(s) (100 mL/Hr) IV Continuous <Continuous>  dextrose 5%. 1000 milliLiter(s) (50 mL/Hr) IV Continuous <Continuous>  dextrose 50% Injectable 25 Gram(s) IV Push once  dextrose 50% Injectable 12.5 Gram(s) IV Push once  dextrose 50% Injectable 25 Gram(s) IV Push once  diltiazem    Tablet 60 milliGRAM(s) Oral every 6 hours  gabapentin 300 milliGRAM(s) Oral three times a day  glucagon  Injectable 1 milliGRAM(s) IntraMuscular once  guaiFENesin Oral Liquid (Sugar-Free) 200 milliGRAM(s) Oral every 6 hours  hydrALAZINE 50 milliGRAM(s) Oral three times a day  insulin lispro (ADMELOG) corrective regimen sliding scale   SubCutaneous three times a day before meals  insulin lispro (ADMELOG) corrective regimen sliding scale   SubCutaneous at bedtime  latanoprost 0.005% Ophthalmic Solution 1 Drop(s) Both EYES at bedtime  losartan 50 milliGRAM(s) Oral two times a day  methylphenidate 10 milliGRAM(s) Oral <User Schedule>  mirtazapine 7.5 milliGRAM(s) Oral at bedtime  oseltamivir 75 milliGRAM(s) Oral two times a day  sodium chloride 0.9%. 1000 milliLiter(s) (75 mL/Hr) IV Continuous <Continuous>    MEDICATIONS  (PRN):  acetaminophen     Tablet .. 650 milliGRAM(s) Oral every 6 hours PRN Temp greater or equal to 38C (100.4F), Mild Pain (1 - 3)  benzonatate 100 milliGRAM(s) Oral every 8 hours PRN Cough  dextrose Oral Gel 15 Gram(s) Oral once PRN Blood Glucose LESS THAN 70 milliGRAM(s)/deciliter  fluticasone propionate 50 MICROgram(s)/spray Nasal Spray 1 Spray(s) Both Nostrils two times a day PRN nasal congestion      Allergies    No Known Allergies    Intolerances        REVIEW OF SYSTEMS:    CONSTITUTIONAL: No weakness, fevers or chills    All other review of systems is negative unless indicated above    VITAL SIGNS:   Vital Signs Last 24 Hrs  T(C): 36.7 (08 Jan 2023 11:43), Max: 37.5 (07 Jan 2023 19:54)  T(F): 98 (08 Jan 2023 11:43), Max: 99.5 (07 Jan 2023 19:54)  HR: 115 (08 Jan 2023 11:43) (103 - 125)  BP: 167/106 (08 Jan 2023 11:43) (133/97 - 169/94)  BP(mean): --  RR: 18 (08 Jan 2023 11:43) (16 - 18)  SpO2: 98% (08 Jan 2023 11:43) (96% - 100%)    Parameters below as of 08 Jan 2023 11:43  Patient On (Oxygen Delivery Method): nasal cannula  O2 Flow (L/min): 2      I&O's Summary      On Exam:    Constitutional: lethargic frail   Lungs:  Non-labored, breath sounds are coarse   Cardiovascular: IRRR. tachy  S1 and S2 positive.  No murmurs, rubs, gallops or clicks  Gastrointestinal: Bowel Sounds present, soft, nontender.   Lymph: No peripheral edema. No cervical lymphadenopathy.  Neurological: Alert, no focal deficits  Skin: No rashes or ulcers   Psych:  Mood & affect appropriate.    LABS: All Labs Reviewed:                        11.9   8.67  )-----------( 171      ( 08 Jan 2023 06:09 )             37.5                         12.9   7.92  )-----------( 156      ( 07 Jan 2023 10:45 )             40.3     08 Jan 2023 06:09    142    |  107    |  8      ----------------------------<  102    3.6     |  25     |  0.77   07 Jan 2023 10:45    142    |  109    |  10     ----------------------------<  121    4.3     |  27     |  0.95     Ca    8.2        08 Jan 2023 06:09  Ca    9.0        07 Jan 2023 10:45    TPro  5.9    /  Alb  2.8    /  TBili  0.5    /  DBili  x      /  AST  15     /  ALT  14     /  AlkPhos  67     08 Jan 2023 06:09  TPro  6.7    /  Alb  3.1    /  TBili  0.6    /  DBili  x      /  AST  18     /  ALT  16     /  AlkPhos  81     07 Jan 2023 10:45    PT/INR - ( 07 Jan 2023 10:45 )   PT: 20.1 sec;   INR: 1.71 ratio         PTT - ( 07 Jan 2023 10:45 )  PTT:27.0 sec      Blood Culture:   01-07 @ 10:45  Pro Bnp 1424        RADIOLOGY:    EKG:< from: 12 Lead ECG (01.07.23 @ 10:19) >    Ventricular Rate 118 BPM    QRS Duration 82 ms    Q-T Interval 266 ms    QTC Calculation(Bazett) 372 ms    R Axis 264 degrees    T Axis 36 degrees    Diagnosis Line Atrial fibrillation  Right ventricular hypertrophy  Left axis deviation  Anterior infarct (cited on or before 22-MAY-2022)  Abnormal ECG    < end of copied text >      Patient name: MELBA PARMAR  YOB: 1950   Age: 72 (M)   MR#: [id number]  Study Date: 10/20/2022  [facility] RDCS  Study quality: Technically difficult  Referring Physician: Talat Royal MD  Blood Pressure: 148/105 mmHg  Height: 165 cm  Weight: 68 kg  BSA: 1.8 m2  Heart Rhythm: Atrial fibrillation  ------------------------------------------------------------------------  PROCEDURE: Transthoracic echocardiogram with 2-D, M-Mode  and complete spectral and color flow Doppler. Verbal  consent was obtained for injection of  Ultrasonic Enhancing  Agent following a discussion of risks and benefits.  Following intravenous injection of Ultrasonic Enhancing  Agent, harmonic imaging was performed.  INDICATION: Other cerebrovascular disease (I67.89)  ------------------------------------------------------------------------  Dimensions:    Normal Values:  LA:     4.6    2.0 - 4.0 cm  Ao:     3.8    2.0 - 3.8 cm  SEPTUM: 1.2    0.6 - 1.2 cm  PWT:1.0    0.6 - 1.1 cm  LVIDd:  3.8    3.0 - 5.6 cm  LVIDs:  2.5    1.8 - 4.0 cm  Derived variables:  LVMI: 77 g/m2  RWT: 0.52  EF (Visual Estimate): 65 %  Doppler Peak Velocity (m/sec): AoV=0.9  ------------------------------------------------------------------------  Observations:  Mitral Valve: Normal mitral valve.  Aortic Valve/Aorta: Normal aortic valve. .  Normal aortic root size.  Left Atrium: Normal left atrium.  Left Ventricle: Endocardial visualization enhanced with  intravenous injection of Ultrasonic Enhancing Agent  (Definity).  Normal left ventricular internal dimensions and wall  thicknesses.  Normal left ventricular systolic function. No segmental  wall motion abnormalities.  Right Heart: Normal right atrium. Normal right ventricular  size and function.  Normal tricuspid valve. Normal pulmonic valve.  Pericardium/Pleura: Normal pericardium with no pericardial  effusion.  Hemodynamic: No evidence of pulmoanry hypertension.  ------------------------------------------------------------------------  Conclusions:  Endocardial visualization enhanced with intravenous  injection of Ultrasonic Enhancing Agent (Definity).  Normal left ventricular systolic function. No segmental  wall motion abnormalities.  ------------------------------------------------------------------------  Confirmed on  10/20/2022 - 15:27:02 by Luis Fernando Gutierrez MD, FASE  ------------------------------------------------------------------------

## 2023-01-08 NOTE — PHYSICAL THERAPY INITIAL EVALUATION ADULT - PERTINENT HX OF CURRENT PROBLEM, REHAB EVAL
Patient is a 72 year old male admitted from home due to fever and cough.  Patient's Wife present at bedside and reports pt began feeling sick with fever and cough midday on 1/6. Pt remained afebrile throughout the day, but was found to be feverish and lethargic. At baseline, pt is disabled since his TBI in August 2021. PMH: hypertension, hyperlipidemia, CAD, TBI, subdural hematoma s/p craniotomy in August 2021, atrial fibrillation (on Eliquis).

## 2023-01-08 NOTE — PHYSICAL THERAPY INITIAL EVALUATION ADULT - PREDICTED DURATION OF THERAPY (DAYS/WKS), PT EVAL
pt with increased tone and pain with movement; pt bedbound/wheelchair bound and requires eladio lift for transfers. Patient appears to be at functional baseline. Patient will not be placed on PT program.

## 2023-01-08 NOTE — PHARMACOTHERAPY INTERVENTION NOTE - COMMENTS
Patient w/ PMH A Fib and persistent DVT's is on eliquis 2.5mg BID (home dose 5mg BID). 1/7 US Duplex showed persistent thrombus. Discussed w/ Dr. Pruitt that pt doesn't meet criteria for reduced dosing (age>90, weight<60kg, SCr >1.5) if using for A Fib or DVT tx and recommended 5mg BID (if not new DVT). MD confirmed that this is not a new DVT  and agreed with increasing dose.

## 2023-01-08 NOTE — PHYSICAL THERAPY INITIAL EVALUATION ADULT - IMPAIRMENTS FOUND, PT EVAL
fine motor/gait, locomotion, and balance/gross motor/joint integrity and mobility/muscle strength/posture

## 2023-01-08 NOTE — PHYSICAL THERAPY INITIAL EVALUATION ADULT - PASSIVE RANGE OF MOTION EXAMINATION, REHAB EVAL
Right LE Passive ROM was WFL except Right knee flexion PROM limited by increased tone and pain. Left knee extension unable to perform PROM/AROM knee flexion due to increased tone and pain./bilateral lower extremity Passive ROM was WFL (within functional limits)

## 2023-01-08 NOTE — CONSULT NOTE ADULT - NS ATTEND AMEND GEN_ALL_CORE FT
chronic af, with af with rvr in the setting of flu  titrate up ccb as tolerated by BP  no sign of volume overload and ct without effusions  has been on ac because of dvt, not af

## 2023-01-08 NOTE — CONSULT NOTE ADULT - ASSESSMENT
Echo 2022 as above normal LV function  71 y/o M with past medical history of hypertension, hyperlipidemia,, TBI, subdural hematoma s/p craniotomy in August 2021, atrial fibrillation, LLE DVT  brought in by ambulance from home due to fever and cough.  Patient was found to be feverish and lethargic brought to ed for further evaluation. . At baseline, pt is disabled since his TBI in August 2021. Pt was found to be Flu+ in the ED .     Presenting with cough f/w flu, DVT, afib in setting of above   EKG as above AF 118bpm old anterior infarct LAD , troponin negative   monitor on telemetry   cardizem increased today 1/8 to 60mg po every 6 hours   bp 135/80 can continue hydralazine and losartan for now   has known afib followed by Dr Goldsmith was on eliquis but deemed too high risk given  SDH was taken off now back on eliquis for DVT - fu primary recs  LE doppler with persistent DVT   Echo 2022 as above normal LV function   FLU A supportive care as per primary     Monitor and replete electrolytes. Keep K>4.0 and Mg>2.0.  Faviola Jane FNP-C  Cardiology NP  [id number] [phone]     71 y/o M with past medical history of hypertension, hyperlipidemia,, TBI, subdural hematoma s/p craniotomy in August 2021, atrial fibrillation, LLE DVT  brought in by ambulance from home due to fever and cough.  Patient was found to be feverish and lethargic brought to ed for further evaluation. . At baseline, pt is disabled since his TBI in August 2021. Pt was found to be Flu+ in the ED .     Presenting with cough f/w flu, DVT, afib in setting of above   EKG as above AF 118bpm old anterior infarct LAD , troponin negative   monitor on telemetry   cardizem increased today 1/8 to 60mg po every 6 hours; would increase this to 90 q6hr if remains high   bp 135/80 can continue hydralazine and losartan for now   has known afib followed by Dr Goldsmith was on eliquis but deemed too high risk given  SDH was taken off now back on eliquis for DVTs  LE doppler with persistent DVT   Echo 2022 as above normal LV function   FLU A supportive care as per primary     Monitor and replete electrolytes. Keep K>4.0 and Mg>2.0.  Faviola Jane FNP-C  Cardiology NP  [id number] [phone]

## 2023-01-09 ENCOUNTER — NON-APPOINTMENT (OUTPATIENT)
Age: 73
End: 2023-01-09

## 2023-01-09 PROCEDURE — 99233 SBSQ HOSP IP/OBS HIGH 50: CPT

## 2023-01-09 RX ORDER — DILTIAZEM HCL 120 MG
60 CAPSULE, EXT RELEASE 24 HR ORAL ONCE
Refills: 0 | Status: COMPLETED | OUTPATIENT
Start: 2023-01-09 | End: 2023-01-09

## 2023-01-09 RX ORDER — DILTIAZEM HCL 120 MG
180 CAPSULE, EXT RELEASE 24 HR ORAL DAILY
Refills: 0 | Status: DISCONTINUED | OUTPATIENT
Start: 2023-01-10 | End: 2023-01-10

## 2023-01-09 RX ADMIN — Medication 10 MILLIGRAM(S): at 08:38

## 2023-01-09 RX ADMIN — Medication 75 MILLIGRAM(S): at 08:38

## 2023-01-09 RX ADMIN — ALBUTEROL 2 PUFF(S): 90 AEROSOL, METERED ORAL at 08:37

## 2023-01-09 RX ADMIN — Medication 50 MILLIGRAM(S): at 08:38

## 2023-01-09 RX ADMIN — APIXABAN 5 MILLIGRAM(S): 2.5 TABLET, FILM COATED ORAL at 17:39

## 2023-01-09 RX ADMIN — Medication 60 MILLIGRAM(S): at 17:36

## 2023-01-09 RX ADMIN — APIXABAN 5 MILLIGRAM(S): 2.5 TABLET, FILM COATED ORAL at 08:39

## 2023-01-09 RX ADMIN — LOSARTAN POTASSIUM 50 MILLIGRAM(S): 100 TABLET, FILM COATED ORAL at 17:39

## 2023-01-09 RX ADMIN — Medication 60 MILLIGRAM(S): at 14:56

## 2023-01-09 RX ADMIN — Medication 1: at 12:28

## 2023-01-09 RX ADMIN — Medication 75 MILLIGRAM(S): at 17:39

## 2023-01-09 RX ADMIN — LOSARTAN POTASSIUM 50 MILLIGRAM(S): 100 TABLET, FILM COATED ORAL at 08:38

## 2023-01-09 RX ADMIN — Medication 60 MILLIGRAM(S): at 08:38

## 2023-01-09 RX ADMIN — Medication 10 MILLIGRAM(S): at 14:56

## 2023-01-09 RX ADMIN — GABAPENTIN 300 MILLIGRAM(S): 400 CAPSULE ORAL at 08:38

## 2023-01-09 RX ADMIN — Medication 200 MILLIGRAM(S): at 00:21

## 2023-01-09 RX ADMIN — Medication 60 MILLIGRAM(S): at 00:20

## 2023-01-09 RX ADMIN — Medication 1 SPRAY(S): at 00:39

## 2023-01-09 RX ADMIN — GABAPENTIN 300 MILLIGRAM(S): 400 CAPSULE ORAL at 14:56

## 2023-01-09 NOTE — CHART NOTE - NSCHARTNOTEFT_GEN_A_CORE
Called by RN as nurse states that he is having increasingly difficulty swallowing pills. Told nurse to do bedside dysphagia screen. Rn to call with further changes. Called by RN as nurse states that he is having increasingly difficulty swallowing pills. Told nurse to do bedside dysphagia screen. Rn to call with further changes.    Discussed w/ Dr. Rudd

## 2023-01-09 NOTE — PROGRESS NOTE ADULT - SUBJECTIVE AND OBJECTIVE BOX
[facility] --  Coco Blackburn Patel, Savella, [place]  Office # [phone]    Follow Up:  Afib with RVR    Subjective/Observations: Awake and alert, non-orthopneic on RA.  Denies any form of respiratory or cardiac discomfort    REVIEW OF SYSTEMS: All other review of systems is negative unless indicated above  PAST MEDICAL & SURGICAL HISTORY:  TBI (traumatic brain injury)  HTN (hypertension)  Atrial fibrillation  Peripheral neuropathy  Major depression  HLD (hyperlipidemia)  CAD (coronary artery disease)  BPH (benign prostatic hyperplasia)  Pneumonia due to COVID-19 virus  June 2022  Hemorrhage in the brain  H/O craniotomy    MEDICATIONS  (STANDING):  albuterol    90 MICROgram(s) HFA Inhaler 2 Puff(s) Inhalation every 6 hours  apixaban 5 milliGRAM(s) Oral every 12 hours  benzocaine 20% Spray 1 Spray(s) Topical every 6 hours  dextrose 5%. 1000 milliLiter(s) (100 mL/Hr) IV Continuous <Continuous>  dextrose 5%. 1000 milliLiter(s) (50 mL/Hr) IV Continuous <Continuous>  dextrose 50% Injectable 25 Gram(s) IV Push once  dextrose 50% Injectable 12.5 Gram(s) IV Push once  dextrose 50% Injectable 25 Gram(s) IV Push once  diltiazem    Tablet 60 milliGRAM(s) Oral every 6 hours  gabapentin 300 milliGRAM(s) Oral three times a day  glucagon  Injectable 1 milliGRAM(s) IntraMuscular once  guaiFENesin Oral Liquid (Sugar-Free) 200 milliGRAM(s) Oral every 6 hours  hydrALAZINE 50 milliGRAM(s) Oral three times a day  insulin lispro (ADMELOG) corrective regimen sliding scale   SubCutaneous three times a day before meals  insulin lispro (ADMELOG) corrective regimen sliding scale   SubCutaneous at bedtime  latanoprost 0.005% Ophthalmic Solution 1 Drop(s) Both EYES at bedtime  losartan 50 milliGRAM(s) Oral two times a day  methylphenidate 10 milliGRAM(s) Oral <User Schedule>  mirtazapine 7.5 milliGRAM(s) Oral at bedtime  oseltamivir 75 milliGRAM(s) Oral two times a day  sodium chloride 0.9%. 1000 milliLiter(s) (75 mL/Hr) IV Continuous <Continuous>    MEDICATIONS  (PRN):  acetaminophen     Tablet .. 650 milliGRAM(s) Oral every 6 hours PRN Temp greater or equal to 38C (100.4F), Mild Pain (1 - 3)  benzonatate 100 milliGRAM(s) Oral every 8 hours PRN Cough  dextrose Oral Gel 15 Gram(s) Oral once PRN Blood Glucose LESS THAN 70 milliGRAM(s)/deciliter  fluticasone propionate 50 MICROgram(s)/spray Nasal Spray 1 Spray(s) Both Nostrils two times a day PRN nasal congestion    Allergies    No Known Allergies    Intolerances    Vital Signs Last 24 Hrs  T(C): 36.6 (09 Jan 2023 04:28), Max: 37.3 (08 Jan 2023 18:48)  T(F): 97.8 (09 Jan 2023 04:28), Max: 99.2 (08 Jan 2023 18:48)  HR: 102 (09 Jan 2023 08:35) (90 - 115)  BP: 150/97 (09 Jan 2023 08:35) (127/81 - 167/106)  BP(mean): --  RR: 18 (09 Jan 2023 08:35) (18 - 20)  SpO2: 95% (09 Jan 2023 08:35) (95% - 99%)    Parameters below as of 09 Jan 2023 08:35  Patient On (Oxygen Delivery Method): room air    I&O's Summary    08 Jan 2023 07:01  -  09 Jan 2023 07:00  --------------------------------------------------------  IN: 0 mL / OUT: 1200 mL / NET: -1200 mL     PHYSICAL EXAM:  TELE: Afib  Constitutional: NAD, awake and alert, well-developed  HEENT: Moist Mucous Membranes, Anicteric  Pulmonary: Non-labored, breath sounds are clear bilaterally, No wheezing, rales or rhonchi  Cardiovascular: IRRR, S1 and S2, No murmurs, rubs, gallops or clicks  Gastrointestinal: Bowel Sounds present, soft, nontender.   Lymph: No peripheral edema. No lymphadenopathy.  Skin: No visible rashes or ulcers.  Psych:  Mood & affect: Flat  LABS: All Labs Reviewed:                        11.9   8.67  )-----------( 171      ( 08 Jan 2023 06:09 )             37.5                         12.9   7.92  )-----------( 156      ( 07 Jan 2023 10:45 )             40.3     08 Jan 2023 06:09    142    |  107    |  8      ----------------------------<  102    3.6     |  25     |  0.77   07 Jan 2023 10:45    142    |  109    |  10     ----------------------------<  121    4.3     |  27     |  0.95     Ca    8.2        08 Jan 2023 06:09  Ca    9.0        07 Jan 2023 10:45    TPro  5.9    /  Alb  2.8    /  TBili  0.5    /  DBili  x      /  AST  15     /  ALT  14     /  AlkPhos  67     08 Jan 2023 06:09  TPro  6.7    /  Alb  3.1    /  TBili  0.6    /  DBili  x      /  AST  18     /  ALT  16     /  AlkPhos  81     07 Jan 2023 10:45    PT/INR - ( 07 Jan 2023 10:45 )   PT: 20.1 sec;   INR: 1.71 ratio    PTT - ( 07 Jan 2023 10:45 )  PTT:27.0 sec      Patient name: MELBA PARMAR  YOB: 1950   Age: 72 (M)   MR#: [id number]  Study Date: 10/20/2022  [facility] Myriam [facility] RDCS  Study quality: Technically difficult  Referring Physician: Talat Royal MD  Blood Pressure: 148/105 mmHg  Height: 165 cm  Weight: 68 kg  BSA: 1.8 m2  Heart Rhythm: Atrial fibrillation  ------------------------------------------------------------------------  PROCEDURE: Transthoracic echocardiogram with 2-D, M-Mode  and complete spectral and color flow Doppler. Verbal  consent was obtained for injection of  Ultrasonic Enhancing  Agent following a discussion of risks and benefits.  Following intravenous injection of Ultrasonic Enhancing  Agent, harmonic imaging was performed.  INDICATION: Other cerebrovascular disease (I67.89)  ------------------------------------------------------------------------  Dimensions:    Normal Values:  LA:     4.6    2.0 - 4.0 cm  Ao:     3.8    2.0 - 3.8 cm  SEPTUM: 1.2    0.6 - 1.2 cm  PWT:1.0    0.6 - 1.1 cm  LVIDd:  3.8    3.0 - 5.6 cm  LVIDs:  2.5    1.8 - 4.0 cm  Derived variables:  LVMI: 77 g/m2  RWT: 0.52  EF (Visual Estimate): 65 %  Doppler Peak Velocity (m/sec): AoV=0.9  ------------------------------------------------------------------------  Observations:  Mitral Valve: Normal mitral valve.  Aortic Valve/Aorta: Normal aortic valve. .  Normal aortic root size.  Left Atrium: Normal left atrium.  Left Ventricle: Endocardial visualization enhanced with  intravenous injection of Ultrasonic Enhancing Agent  (Definity).  Normal left ventricular internal dimensions and wall  thicknesses.  Normal left ventricular systolic function. No segmental  wall motion abnormalities.  Right Heart: Normal right atrium. Normal right ventricular  size and function.  Normal tricuspid valve. Normal pulmonic valve.  Pericardium/Pleura: Normal pericardium with no pericardial  effusion.  Hemodynamic: No evidence of pulmoanry hypertension.  ------------------------------------------------------------------------  Conclusions:  Endocardial visualization enhanced with intravenous  injection of Ultrasonic Enhancing Agent (Definity).  Normal left ventricular systolic function. No segmental  wall motion abnormalities.  ------------------------------------------------------------------------  Confirmed on  10/20/2022 - 15:27:02 by Luis Fernando Gutierrez MD, FASE  ------------------------------------------------------------------------    ACC: [id number] EXAM:  CT CHEST                          PROCEDURE DATE:  01/07/2023        INTERPRETATION:  CLINICAL INFORMATION: Cough, fever    COMPARISON: 6/30/2022    CONTRAST/COMPLICATIONS:  IV Contrast: NONE  Oral Contrast: NONE  Complications: None reported at time of study completion    PROCEDURE:  CT of the Chest was performed.  Sagittal and coronal reformats were performed.    FINDINGS:    LUNGS AND AIRWAYS: Patent central airways.  No consolidation or   suspicious pulmonary lesions.Minimal peribronchial mural prominence,   exaggerated by motion unsharpness. Millimeter size pleural-based LLL   granuloma  PLEURA: No pleural effusion.  MEDIASTINUM AND ALYSSA: No lymphadenopathy.  VESSELS: No aortic dilatation. Aortic /coronary artery calcification.  HEART: Cardiomegaly . Small pericardial effusion.  CHEST WALL AND LOWER NECK: Gynecomastia  VISUALIZED UPPER ABDOMEN: Within normal limits.  BONES: Thoracic spondylosis    IMPRESSION:  1.  No airspace opacities or pleural effusions    --- End of Report ---    DOMINIQEU MCMILLAN MD; Attending Radiologist  This document has been electronically signed. Jan 7 2023 11:37AM    Ventricular Rate 118 BPM    QRS Duration 82 ms    Q-T Interval 266 ms    QTC Calculation(Bazett) 372 ms    R Axis 264 degrees    T Axis 36 degrees    Diagnosis Line Atrial fibrillation  Right ventricular hypertrophy  Left axis deviation  Anterior infarct (cited on or before 22-MAY-2022)  Abnormal ECG  Confirmed by mira Barton ([id number]) on 1/7/2023 1:02:00 PM

## 2023-01-09 NOTE — SWALLOW BEDSIDE ASSESSMENT ADULT - SPECIFY REASON(S)
to assess swallow function to assess swallow function. pt is familiar to this service from previous MBS completed in July and November of 2022 (please see reports for details).

## 2023-01-09 NOTE — PROGRESS NOTE ADULT - NUTRITIONAL ASSESSMENT
This patient has been assessed with a concern for Malnutrition and has been determined to have a diagnosis/diagnoses of Moderate protein-calorie malnutrition.    This patient is being managed with:   Diet Soft and Bite Sized-  Consistent Carbohydrate {Evening Snack} (CSTCHOSN)  Mildly Thick Liquids (MILDTHICKLIQS)  Supplement Feeding Modality:  Oral  Glucerna Shake Cans or Servings Per Day:  2       Frequency:  Daily  Entered: Dec  4 2022  5:27PM    
This patient has been assessed with a concern for Malnutrition and has been determined to have a diagnosis/diagnoses of Moderate protein-calorie malnutrition.    This patient is being managed with:   Diet Soft and Bite Sized-  Consistent Carbohydrate {Evening Snack} (CSTCHOSN)  Mildly Thick Liquids (MILDTHICKLIQS)  Supplement Feeding Modality:  Oral  Glucerna Shake Cans or Servings Per Day:  2       Frequency:  Daily  Entered: Dec  4 2022  5:27PM

## 2023-01-09 NOTE — PROGRESS NOTE ADULT - NSPROGADDITIONALINFOA_GEN_ALL_CORE
# Motor system disease-; Chronic, on ritalin 10 mg BID ;  Fall and aspiration precautions     # Diabetes mellitus-  Last A1c 9.3, November 23, 2022   - Monitor Finger sticks, Consistent carb diet (minced and moist  with thickened liquids)   - Hypoglycemic protocol.    # Dysphagia- - Seen by speech and Swallow pathologist- spouse declined MBS; maintain aspirations precautions;

## 2023-01-09 NOTE — SWALLOW BEDSIDE ASSESSMENT ADULT - SWALLOW EVAL: DIAGNOSIS
1- pt demonstrates mild oral dysphagia given soft and bite sized solids, minced and moist solids, puree, mildly thick and thin liquid textures marked by delayed bolus collection, transfer and transport. 2- mild pharyngeal dysphagia given said textures marked by delayed pharyngeal swallow trigger and reduced hyolaryngeal excursion. no overt s/s of penetration/aspiration noted.

## 2023-01-09 NOTE — PROGRESS NOTE ADULT - SUBJECTIVE AND OBJECTIVE BOX
MEDICAL ATTENDING NOTE    Patient is a 72y old  Male who presents with a chief complaint of New onset afib (2023 10:37)      INTERVAL HPI/OVERNIGHT EVENTS: no acute issues reported per nursing staff    MEDICATIONS  (STANDING):  albuterol    90 MICROgram(s) HFA Inhaler 2 Puff(s) Inhalation every 6 hours  apixaban 5 milliGRAM(s) Oral every 12 hours  benzocaine 20% Spray 1 Spray(s) Topical every 6 hours  dextrose 5%. 1000 milliLiter(s) (100 mL/Hr) IV Continuous <Continuous>  dextrose 5%. 1000 milliLiter(s) (50 mL/Hr) IV Continuous <Continuous>  dextrose 50% Injectable 25 Gram(s) IV Push once  dextrose 50% Injectable 12.5 Gram(s) IV Push once  dextrose 50% Injectable 25 Gram(s) IV Push once  diltiazem    Tablet 60 milliGRAM(s) Oral every 6 hours  gabapentin 300 milliGRAM(s) Oral three times a day  glucagon  Injectable 1 milliGRAM(s) IntraMuscular once  guaiFENesin Oral Liquid (Sugar-Free) 200 milliGRAM(s) Oral every 6 hours  hydrALAZINE 50 milliGRAM(s) Oral three times a day  insulin lispro (ADMELOG) corrective regimen sliding scale   SubCutaneous three times a day before meals  insulin lispro (ADMELOG) corrective regimen sliding scale   SubCutaneous at bedtime  latanoprost 0.005% Ophthalmic Solution 1 Drop(s) Both EYES at bedtime  losartan 50 milliGRAM(s) Oral two times a day  methylphenidate 10 milliGRAM(s) Oral <User Schedule>  mirtazapine 7.5 milliGRAM(s) Oral at bedtime  oseltamivir 75 milliGRAM(s) Oral two times a day  sodium chloride 0.9%. 1000 milliLiter(s) (75 mL/Hr) IV Continuous <Continuous>    MEDICATIONS  (PRN):  acetaminophen     Tablet .. 650 milliGRAM(s) Oral every 6 hours PRN Temp greater or equal to 38C (100.4F), Mild Pain (1 - 3)  benzonatate 100 milliGRAM(s) Oral every 8 hours PRN Cough  dextrose Oral Gel 15 Gram(s) Oral once PRN Blood Glucose LESS THAN 70 milliGRAM(s)/deciliter  fluticasone propionate 50 MICROgram(s)/spray Nasal Spray 1 Spray(s) Both Nostrils two times a day PRN nasal congestion      __________________________________________________  ----------------------------------------------------------------------------------  REVIEW OF SYSTEMS: no chest pain, no SOB, No fever, no HA      Vital Signs Last 24 Hrs  T(C): 36.8 (2023 12:35), Max: 37.3 (2023 18:48)  T(F): 98.2 (2023 12:35), Max: 99.2 (2023 18:48)  HR: 116 (2023 14:50) (90 - 116)  BP: 109/78 (2023 14:50) (105/67 - 161/83)  RR: 18 (2023 14:50) (18 - 20)  SpO2: 95% (2023 14:50) (95% - 99%)    Parameters below as of 2023 14:50  Patient On (Oxygen Delivery Method): room air        _________________  PHYSICAL EXAM:  ---------------------------   NAD; Normocephalic;   LUNGS - no wheezing  HEART: S1 S2+   ABDOMEN: Soft, Nontender, non distended  EXTREMITIES: no cyanosis; no edema  NERVOUS SYSTEM:  Awake and alert; follows commands,     _________________________________________________  LABS:                        11.9   8.67  )-----------( 171      ( 2023 06:09 )             37.5     01-08    142  |  107  |  8   ----------------------------<  102<H>  3.6   |  25  |  0.77    Ca    8.2<L>      2023 06:09    TPro  5.9<L>  /  Alb  2.8<L>  /  TBili  0.5  /  DBili  x   /  AST  15  /  ALT  14  /  AlkPhos  67        Urinalysis Basic - ( 2023 17:58 )    Color: Yellow / Appearance: Clear / S.005 / pH: x  Gluc: x / Ketone: Negative  / Bili: Negative / Urobili: Negative   Blood: x / Protein: 30 mg/dL / Nitrite: Negative   Leuk Esterase: Negative / RBC: Negative /HPF / WBC 0-2   Sq Epi: x / Non Sq Epi: Occasional / Bacteria: Occasional      CAPILLARY BLOOD GLUCOSE      POCT Blood Glucose.: 170 mg/dL (2023 11:42)  POCT Blood Glucose.: 115 mg/dL (2023 07:38)  POCT Blood Glucose.: 103 mg/dL (2023 05:47)  POCT Blood Glucose.: 101 mg/dL (2023 00:18)  POCT Blood Glucose.: 116 mg/dL (2023 21:12)  POCT Blood Glucose.: 120 mg/dL (2023 17:41)                Plan of care was discussed with patient;  care was aligned with patient's wishes.             MEDICAL ATTENDING NOTE    Patient is a 72y old  Male who presents with a chief complaint of New onset afib (2023 10:37)      INTERVAL HPI/OVERNIGHT EVENTS: no acute issues reported per nursing staff    MEDICATIONS  (STANDING):  albuterol    90 MICROgram(s) HFA Inhaler 2 Puff(s) Inhalation every 6 hours  apixaban 5 milliGRAM(s) Oral every 12 hours  benzocaine 20% Spray 1 Spray(s) Topical every 6 hours  dextrose 5%. 1000 milliLiter(s) (100 mL/Hr) IV Continuous <Continuous>  dextrose 5%. 1000 milliLiter(s) (50 mL/Hr) IV Continuous <Continuous>  dextrose 50% Injectable 25 Gram(s) IV Push once  dextrose 50% Injectable 12.5 Gram(s) IV Push once  dextrose 50% Injectable 25 Gram(s) IV Push once  diltiazem    Tablet 60 milliGRAM(s) Oral every 6 hours  gabapentin 300 milliGRAM(s) Oral three times a day  glucagon  Injectable 1 milliGRAM(s) IntraMuscular once  guaiFENesin Oral Liquid (Sugar-Free) 200 milliGRAM(s) Oral every 6 hours  hydrALAZINE 50 milliGRAM(s) Oral three times a day  insulin lispro (ADMELOG) corrective regimen sliding scale   SubCutaneous three times a day before meals  insulin lispro (ADMELOG) corrective regimen sliding scale   SubCutaneous at bedtime  latanoprost 0.005% Ophthalmic Solution 1 Drop(s) Both EYES at bedtime  losartan 50 milliGRAM(s) Oral two times a day  methylphenidate 10 milliGRAM(s) Oral <User Schedule>  mirtazapine 7.5 milliGRAM(s) Oral at bedtime  oseltamivir 75 milliGRAM(s) Oral two times a day  sodium chloride 0.9%. 1000 milliLiter(s) (75 mL/Hr) IV Continuous <Continuous>    MEDICATIONS  (PRN):  acetaminophen     Tablet .. 650 milliGRAM(s) Oral every 6 hours PRN Temp greater or equal to 38C (100.4F), Mild Pain (1 - 3)  benzonatate 100 milliGRAM(s) Oral every 8 hours PRN Cough  dextrose Oral Gel 15 Gram(s) Oral once PRN Blood Glucose LESS THAN 70 milliGRAM(s)/deciliter  fluticasone propionate 50 MICROgram(s)/spray Nasal Spray 1 Spray(s) Both Nostrils two times a day PRN nasal congestion      __________________________________________________  ----------------------------------------------------------------------------------  REVIEW OF SYSTEMS: no chest pain, no SOB, No fever, no HA      Vital Signs Last 24 Hrs  T(C): 36.8 (2023 12:35), Max: 37.3 (2023 18:48)  T(F): 98.2 (2023 12:35), Max: 99.2 (2023 18:48)  HR: 116 (2023 14:50) (90 - 116)  BP: 109/78 (2023 14:50) (105/67 - 161/83)  RR: 18 (2023 14:50) (18 - 20)  SpO2: 95% (2023 14:50) (95% - 99%)    Parameters below as of 2023 14:50  Patient On (Oxygen Delivery Method): room air        _________________  PHYSICAL EXAM:  ---------------------------   NAD; Normocephalic;   LUNGS - no wheezing  HEART: S1 S2+   ABDOMEN: Soft, Nontender, non distended, BS+  EXTREMITIES: no cyanosis; no edema  NERVOUS SYSTEM:  Awake ;  follows commands,     _________________________________________________  LABS:                        11.9   8.67  )-----------( 171      ( 2023 06:09 )             37.5     01-08    142  |  107  |  8   ----------------------------<  102<H>  3.6   |  25  |  0.77    Ca    8.2<L>      2023 06:09    TPro  5.9<L>  /  Alb  2.8<L>  /  TBili  0.5  /  DBili  x   /  AST  15  /  ALT  14  /  AlkPhos  67        Urinalysis Basic - ( 2023 17:58 )    Color: Yellow / Appearance: Clear / S.005 / pH: x  Gluc: x / Ketone: Negative  / Bili: Negative / Urobili: Negative   Blood: x / Protein: 30 mg/dL / Nitrite: Negative   Leuk Esterase: Negative / RBC: Negative /HPF / WBC 0-2   Sq Epi: x / Non Sq Epi: Occasional / Bacteria: Occasional      CAPILLARY BLOOD GLUCOSE      POCT Blood Glucose.: 170 mg/dL (2023 11:42)  POCT Blood Glucose.: 115 mg/dL (2023 07:38)  POCT Blood Glucose.: 103 mg/dL (2023 05:47)  POCT Blood Glucose.: 101 mg/dL (2023 00:18)  POCT Blood Glucose.: 116 mg/dL (2023 21:12)  POCT Blood Glucose.: 120 mg/dL (2023 17:41)                Plan of care was discussed with patient;  care was aligned with patient's wishes.             MEDICAL ATTENDING NOTE    Patient is a 72y old  Male who presents with a chief complaint of New onset afib (2023 10:37)      INTERVAL HPI/OVERNIGHT EVENTS: no acute issues reported per nursing staff    MEDICATIONS  (STANDING):  albuterol    90 MICROgram(s) HFA Inhaler 2 Puff(s) Inhalation every 6 hours  apixaban 5 milliGRAM(s) Oral every 12 hours  benzocaine 20% Spray 1 Spray(s) Topical every 6 hours  dextrose 5%. 1000 milliLiter(s) (100 mL/Hr) IV Continuous <Continuous>  dextrose 5%. 1000 milliLiter(s) (50 mL/Hr) IV Continuous <Continuous>  dextrose 50% Injectable 25 Gram(s) IV Push once  dextrose 50% Injectable 12.5 Gram(s) IV Push once  dextrose 50% Injectable 25 Gram(s) IV Push once  diltiazem    Tablet 60 milliGRAM(s) Oral every 6 hours  gabapentin 300 milliGRAM(s) Oral three times a day  glucagon  Injectable 1 milliGRAM(s) IntraMuscular once  guaiFENesin Oral Liquid (Sugar-Free) 200 milliGRAM(s) Oral every 6 hours  hydrALAZINE 50 milliGRAM(s) Oral three times a day  insulin lispro (ADMELOG) corrective regimen sliding scale   SubCutaneous three times a day before meals  insulin lispro (ADMELOG) corrective regimen sliding scale   SubCutaneous at bedtime  latanoprost 0.005% Ophthalmic Solution 1 Drop(s) Both EYES at bedtime  losartan 50 milliGRAM(s) Oral two times a day  methylphenidate 10 milliGRAM(s) Oral <User Schedule>  mirtazapine 7.5 milliGRAM(s) Oral at bedtime  oseltamivir 75 milliGRAM(s) Oral two times a day  sodium chloride 0.9%. 1000 milliLiter(s) (75 mL/Hr) IV Continuous <Continuous>    MEDICATIONS  (PRN):  acetaminophen     Tablet .. 650 milliGRAM(s) Oral every 6 hours PRN Temp greater or equal to 38C (100.4F), Mild Pain (1 - 3)  benzonatate 100 milliGRAM(s) Oral every 8 hours PRN Cough  dextrose Oral Gel 15 Gram(s) Oral once PRN Blood Glucose LESS THAN 70 milliGRAM(s)/deciliter  fluticasone propionate 50 MICROgram(s)/spray Nasal Spray 1 Spray(s) Both Nostrils two times a day PRN nasal congestion      __________________________________________________  ----------------------------------------------------------------------------------  REVIEW OF SYSTEMS: no chest pain, no SOB, No fever, no HA      Vital Signs Last 24 Hrs  T(C): 36.8 (2023 12:35), Max: 37.3 (2023 18:48)  T(F): 98.2 (2023 12:35), Max: 99.2 (2023 18:48)  HR: 116 (2023 14:50) (90 - 116)  BP: 109/78 (2023 14:50) (105/67 - 161/83)  RR: 18 (2023 14:50) (18 - 20)  SpO2: 95% (2023 14:50) (95% - 99%)    Parameters below as of 2023 14:50  Patient On (Oxygen Delivery Method): room air        _________________  PHYSICAL EXAM:  ---------------------------   NAD; Normocephalic;   LUNGS - no wheezing  HEART: S1 S2+   ABDOMEN: Soft, Nontender, non distended, BS+  EXTREMITIES: no cyanosis; no edema  NERVOUS SYSTEM:  Awake ;  follows commands, chronic neuro- muscular deformity causing bedbound state    _________________________________________________  LABS:                        11.9   8.67  )-----------( 171      ( 2023 06:09 )             37.5     -    142  |  107  |  8   ----------------------------<  102<H>  3.6   |  25  |  0.77    Ca    8.2<L>      2023 06:09    TPro  5.9<L>  /  Alb  2.8<L>  /  TBili  0.5  /  DBili  x   /  AST  15  /  ALT  14  /  AlkPhos  67  08      Urinalysis Basic - ( 2023 17:58 )    Color: Yellow / Appearance: Clear / S.005 / pH: x  Gluc: x / Ketone: Negative  / Bili: Negative / Urobili: Negative   Blood: x / Protein: 30 mg/dL / Nitrite: Negative   Leuk Esterase: Negative / RBC: Negative /HPF / WBC 0-2   Sq Epi: x / Non Sq Epi: Occasional / Bacteria: Occasional      CAPILLARY BLOOD GLUCOSE      POCT Blood Glucose.: 170 mg/dL (2023 11:42)  POCT Blood Glucose.: 115 mg/dL (2023 07:38)  POCT Blood Glucose.: 103 mg/dL (2023 05:47)  POCT Blood Glucose.: 101 mg/dL (2023 00:18)  POCT Blood Glucose.: 116 mg/dL (2023 21:12)  POCT Blood Glucose.: 120 mg/dL (2023 17:41)                Plan of care was discussed with patient;  care was aligned with patient's wishes.

## 2023-01-09 NOTE — SWALLOW BEDSIDE ASSESSMENT ADULT - ADDITIONAL RECOMMENDATIONS
Pt would benefit from swallow therapy. This dept to continue to f/u as schedule permits while pt is admitted to [facility]. Consider outpatient swallow therapy upon discharge which can be scheduled at the [facility] at [phone].

## 2023-01-09 NOTE — PROGRESS NOTE ADULT - NS ATTEST RISK PROBLEM GEN_ALL_CORE FT
rapid atrial fibrillation requiring medication dose adjustment and telemetry monitoring.  he is at risk of sudden cardiac decompensation.    Plan of care discussed with spouse in details at bedside

## 2023-01-09 NOTE — PROVIDER CONTACT NOTE (OTHER) - ASSESSMENT
AOx2, increased difficulty in getting patient to take and swallow po content. High risk for aspiration

## 2023-01-09 NOTE — PROGRESS NOTE ADULT - PROBLEM SELECTOR PLAN 1
Febrile, cough, lethargy w/ sick contact; Influenza A +  - Metabolic encephalopathy 2/2 acute viral influenza  - s/p 1 dose of Tamiflu, continue BID for 5 days   - Supportive care  - Monitor respiratory status  - Robitussin and Tessalon pearls PRN for cough  - Albuterol inhalation 4x daily to open airway   - Tylenol PRN for fever  - Supplemental O2 if warranted  - Droplet precautions. Febrile, cough, lethargy w/ sick contact; Influenza A +  - Metabolic encephalopathy 2/2 acute viral influenza  - s/p 1 dose of Tamiflu, continue to complete course  - Supportive care  - Monitor respiratory status  - Robitussin and Tessalon pearls PRN for cough  - Albuterol bronchodilator as needed  - Tylenol PRN for fever  - Supplemental O2 if warranted  - Droplet precautions.

## 2023-01-09 NOTE — SWALLOW BEDSIDE ASSESSMENT ADULT - COMMENTS
Pt was alert, cooperative and positioned upright for a clinical assessment of swallow function this AM. Pt was alert, cooperative and positioned upright for a clinical assessment of swallow function this AM. Per charting, pt is a "73 y/o M with past medical history of hypertension, hyperlipidemia,, TBI, subdural hematoma s/p craniotomy in August 2021, atrial fibrillation, LLE DVT  brought in by ambulance from home due to fever and cough.  Patient was found to be feverish and lethargic brought to ed for further evaluation. . At baseline, pt is disabled since his TBI in August 2021. Pt was found to be Flu+ in the ED ."     Recent CXR revealed " No airspace opacities or pleural effusions". Further chart review revealed recent chart note with concern for dysphagia with medications (please see note for details).    At the time of today's assessment, pt's spouse, Nay, is present. She is feeding pt soft and bite sized textures brought in from home (i.e. cut fruit, scrambled eggs). She states her  has been consuming soft solids with thin liquids since his last admission. She states to SLP "I refuse a feeding tube" and "he doesn't need this food". Verbal education provided to pt and spouse re: concern for dysphagia. Results of today's bedside evaluation discussed with pt and spouse including recommendation for repeat MBS at which time spouse states "I will think about it".

## 2023-01-09 NOTE — PROGRESS NOTE ADULT - PROBLEM SELECTOR PLAN 2
Atrial fibrillation.   ·  Plan: EKG: Rate 118. Atrial fibrillation. Right ventricular hypertrophy. Left axis deviation. Anterior infarct  (noted on priors)  - Likely reactive in setting of Flu   - S/p 1x Cardizem 10 IVP in ED and 10 mg PO   - Takes at home Cardizem 120 mg CD qd, increased to 240 mg -- 60 mg q6 hr per cardio   - Replete mag and phos as needed  - Outpatient cardio Dr. Goldsmith  - Cardiology, Dr. Barton  - Telemonitor Atrial fibrillation.   ·  Plan: EKG: Rate 118. Atrial fibrillation. Right ventricular hypertrophy. Left axis deviation. Anterior infarct  (noted on priors)  - Likely reactive in setting of Flu   - S/p 1x Cardizem 10 IVP in ED and 10 mg PO   - Takes at home Cardizem 120 mg CD qd; will , increase to 180mg   - Replete mag and phos as needed  - Outpatient cardio Dr. Goldsmith  - Cardiology, Dr. Barton

## 2023-01-09 NOTE — SWALLOW BEDSIDE ASSESSMENT ADULT - ASR SWALLOW RECOMMEND DIAG
consider MBS pending GOC discussion with pt/pt family, as pt's spouse states to SLP throughout evaluation she does not wish for pt to consume downgraded dietary textures and states she "will think about" MBS/VFSS/MBS

## 2023-01-10 ENCOUNTER — TRANSCRIPTION ENCOUNTER (OUTPATIENT)
Age: 73
End: 2023-01-10

## 2023-01-10 VITALS
TEMPERATURE: 98 F | RESPIRATION RATE: 18 BRPM | OXYGEN SATURATION: 94 % | HEART RATE: 100 BPM | SYSTOLIC BLOOD PRESSURE: 115 MMHG | DIASTOLIC BLOOD PRESSURE: 77 MMHG

## 2023-01-10 PROCEDURE — 70450 CT HEAD/BRAIN W/O DYE: CPT | Mod: MA

## 2023-01-10 PROCEDURE — 99285 EMERGENCY DEPT VISIT HI MDM: CPT

## 2023-01-10 PROCEDURE — 94640 AIRWAY INHALATION TREATMENT: CPT

## 2023-01-10 PROCEDURE — 83605 ASSAY OF LACTIC ACID: CPT

## 2023-01-10 PROCEDURE — 81001 URINALYSIS AUTO W/SCOPE: CPT

## 2023-01-10 PROCEDURE — 80053 COMPREHEN METABOLIC PANEL: CPT

## 2023-01-10 PROCEDURE — 85025 COMPLETE CBC W/AUTO DIFF WBC: CPT

## 2023-01-10 PROCEDURE — 96374 THER/PROPH/DIAG INJ IV PUSH: CPT

## 2023-01-10 PROCEDURE — 97162 PT EVAL MOD COMPLEX 30 MIN: CPT

## 2023-01-10 PROCEDURE — 82962 GLUCOSE BLOOD TEST: CPT

## 2023-01-10 PROCEDURE — 93970 EXTREMITY STUDY: CPT

## 2023-01-10 PROCEDURE — 99239 HOSP IP/OBS DSCHRG MGMT >30: CPT

## 2023-01-10 PROCEDURE — 96376 TX/PRO/DX INJ SAME DRUG ADON: CPT

## 2023-01-10 PROCEDURE — 87637 SARSCOV2&INF A&B&RSV AMP PRB: CPT

## 2023-01-10 PROCEDURE — 85730 THROMBOPLASTIN TIME PARTIAL: CPT

## 2023-01-10 PROCEDURE — 87086 URINE CULTURE/COLONY COUNT: CPT

## 2023-01-10 PROCEDURE — 36415 COLL VENOUS BLD VENIPUNCTURE: CPT

## 2023-01-10 PROCEDURE — 87040 BLOOD CULTURE FOR BACTERIA: CPT

## 2023-01-10 PROCEDURE — 92610 EVALUATE SWALLOWING FUNCTION: CPT

## 2023-01-10 PROCEDURE — 93005 ELECTROCARDIOGRAM TRACING: CPT

## 2023-01-10 PROCEDURE — 96375 TX/PRO/DX INJ NEW DRUG ADDON: CPT

## 2023-01-10 PROCEDURE — 71250 CT THORAX DX C-: CPT | Mod: MA

## 2023-01-10 PROCEDURE — 85610 PROTHROMBIN TIME: CPT

## 2023-01-10 PROCEDURE — 99232 SBSQ HOSP IP/OBS MODERATE 35: CPT

## 2023-01-10 PROCEDURE — 84484 ASSAY OF TROPONIN QUANT: CPT

## 2023-01-10 PROCEDURE — 83880 ASSAY OF NATRIURETIC PEPTIDE: CPT

## 2023-01-10 PROCEDURE — 71045 X-RAY EXAM CHEST 1 VIEW: CPT

## 2023-01-10 RX ORDER — FLUTICASONE PROPIONATE 50 MCG
1 SPRAY, SUSPENSION NASAL
Qty: 0 | Refills: 0 | DISCHARGE
Start: 2023-01-10

## 2023-01-10 RX ORDER — DILTIAZEM HCL 120 MG
1 CAPSULE, EXT RELEASE 24 HR ORAL
Qty: 0 | Refills: 0 | DISCHARGE
Start: 2023-01-10

## 2023-01-10 RX ADMIN — GABAPENTIN 300 MILLIGRAM(S): 400 CAPSULE ORAL at 05:47

## 2023-01-10 RX ADMIN — GABAPENTIN 300 MILLIGRAM(S): 400 CAPSULE ORAL at 14:31

## 2023-01-10 RX ADMIN — Medication 50 MILLIGRAM(S): at 14:31

## 2023-01-10 RX ADMIN — Medication 50 MILLIGRAM(S): at 05:46

## 2023-01-10 RX ADMIN — Medication 1: at 12:27

## 2023-01-10 RX ADMIN — Medication 200 MILLIGRAM(S): at 12:27

## 2023-01-10 RX ADMIN — Medication 180 MILLIGRAM(S): at 05:47

## 2023-01-10 RX ADMIN — Medication 10 MILLIGRAM(S): at 08:42

## 2023-01-10 RX ADMIN — LOSARTAN POTASSIUM 50 MILLIGRAM(S): 100 TABLET, FILM COATED ORAL at 05:46

## 2023-01-10 RX ADMIN — Medication 200 MILLIGRAM(S): at 05:47

## 2023-01-10 RX ADMIN — APIXABAN 5 MILLIGRAM(S): 2.5 TABLET, FILM COATED ORAL at 05:47

## 2023-01-10 RX ADMIN — Medication 10 MILLIGRAM(S): at 12:28

## 2023-01-10 RX ADMIN — Medication 75 MILLIGRAM(S): at 18:13

## 2023-01-10 RX ADMIN — Medication 75 MILLIGRAM(S): at 05:46

## 2023-01-10 RX ADMIN — Medication 1 SPRAY(S): at 12:28

## 2023-01-10 NOTE — DISCHARGE NOTE NURSING/CASE MANAGEMENT/SOCIAL WORK - NSDCPEFALRISK_GEN_ALL_CORE
For information on Fall & Injury Prevention, visit: [email or web address] OR  [email or web address] OR  https://www.cdc.gov/steadi/patient.html

## 2023-01-10 NOTE — DISCHARGE NOTE PROVIDER - NSDCFUSCHEDAPPT_GEN_ALL_CORE_FT
Doris Gage  [facility]  VASCULAR [address] [facility] [address]  Scheduled Appointment: 01/18/2023    Mirta Goldsmith  [facility]  CARDIOLOGY [address] [facility] [facility] [address]  Scheduled Appointment: 01/18/2023    Richard Mathis  [facility]  INTMED [address]  Scheduled Appointment: 02/15/2023

## 2023-01-10 NOTE — PROGRESS NOTE ADULT - SUBJECTIVE AND OBJECTIVE BOX
[facility] -- Coco Blackburn Patel, Savella, [place]  Office # [phone]    Follow Up:  Afib with RVR, Hx CAD    Subjective/Observations: Asleep but arousable to name-calling.  Denies pain, SOB, palplitations or CP    REVIEW OF SYSTEMS: All other review of systems is negative unless indicated above  PAST MEDICAL & SURGICAL HISTORY:  TBI (traumatic brain injury)  HTN (hypertension)  Atrial fibrillation  Peripheral neuropathy  Major depression  HLD (hyperlipidemia)  CAD (coronary artery disease)  BPH (benign prostatic hyperplasia)  Pneumonia due to COVID-19 virus  June 2022  Hemorrhage in the brain  H/O craniotomy    MEDICATIONS  (STANDING):  albuterol    90 MICROgram(s) HFA Inhaler 2 Puff(s) Inhalation every 6 hours  apixaban 5 milliGRAM(s) Oral every 12 hours  benzocaine 20% Spray 1 Spray(s) Topical every 6 hours  dextrose 5%. 1000 milliLiter(s) (50 mL/Hr) IV Continuous <Continuous>  dextrose 5%. 1000 milliLiter(s) (100 mL/Hr) IV Continuous <Continuous>  dextrose 50% Injectable 12.5 Gram(s) IV Push once  dextrose 50% Injectable 25 Gram(s) IV Push once  dextrose 50% Injectable 25 Gram(s) IV Push once  diltiazem    milliGRAM(s) Oral daily  gabapentin 300 milliGRAM(s) Oral three times a day  glucagon  Injectable 1 milliGRAM(s) IntraMuscular once  guaiFENesin Oral Liquid (Sugar-Free) 200 milliGRAM(s) Oral every 6 hours  hydrALAZINE 50 milliGRAM(s) Oral three times a day  insulin lispro (ADMELOG) corrective regimen sliding scale   SubCutaneous three times a day before meals  insulin lispro (ADMELOG) corrective regimen sliding scale   SubCutaneous at bedtime  latanoprost 0.005% Ophthalmic Solution 1 Drop(s) Both EYES at bedtime  losartan 50 milliGRAM(s) Oral two times a day  methylphenidate 10 milliGRAM(s) Oral <User Schedule>  mirtazapine 7.5 milliGRAM(s) Oral at bedtime  oseltamivir 75 milliGRAM(s) Oral two times a day  sodium chloride 0.9%. 1000 milliLiter(s) (75 mL/Hr) IV Continuous <Continuous>    MEDICATIONS  (PRN):  acetaminophen     Tablet .. 650 milliGRAM(s) Oral every 6 hours PRN Temp greater or equal to 38C (100.4F), Mild Pain (1 - 3)  benzonatate 100 milliGRAM(s) Oral every 8 hours PRN Cough  dextrose Oral Gel 15 Gram(s) Oral once PRN Blood Glucose LESS THAN 70 milliGRAM(s)/deciliter  fluticasone propionate 50 MICROgram(s)/spray Nasal Spray 1 Spray(s) Both Nostrils two times a day PRN nasal congestion    Allergies    No Known Allergies    Intolerances    Vital Signs Last 24 Hrs  T(C): 36.8 (10 Eduin 2023 11:09), Max: 36.8 (10 Eduin 2023 11:09)  T(F): 98.3 (10 Eduin 2023 11:09), Max: 98.3 (10 Eduin 2023 11:09)  HR: 95 (10 Eduin 2023 11:09) (78 - 116)  BP: 126/89 (10 Eduin 2023 11:09) (109/78 - 153/96)  BP(mean): --  RR: 18 (10 Eduin 2023 11:09) (18 - 18)  SpO2: 94% (10 Eduin 2023 11:09) (94% - 96%)    Parameters below as of 10 Eduin 2023 11:09  Patient On (Oxygen Delivery Method): room air    I&O's Summary    09 Jan 2023 07:01  -  10 Eduin 2023 07:00  --------------------------------------------------------  IN: 0 mL / OUT: 600 mL / NET: -600 mL    PHYSICAL EXAM:  TELE: Afib  Constitutional: NAD, awake and alert, well-developed  HEENT: Moist Mucous Membranes, Anicteric  Pulmonary: Non-labored, breath sounds are clear bilaterally, No wheezing, rales or rhonchi  Cardiovascular: IRRR, S1 and S2, No murmurs, rubs, gallops or clicks  Gastrointestinal: Bowel Sounds present, soft, nontender.   Lymph: No peripheral edema. No lymphadenopathy.  Skin: No visible rashes or ulcers.  Psych:  Mood & affect: Flat    LABS: All Labs Reviewed:                        11.9   8.67  )-----------( 171      ( 08 Jan 2023 06:09 )             37.5     08 Jan 2023 06:09    142    |  107    |  8      ----------------------------<  102    3.6     |  25     |  0.77     Ca    8.2        08 Jan 2023 06:09    TPro  5.9    /  Alb  2.8    /  TBili  0.5    /  DBili  x      /  AST  15     /  ALT  14     /  AlkPhos  67     08 Jan 2023 06:09      Patient name: MELBA PARMAR  YOB: 1950   Age: 72 (M)   MR#: [id number]  Study Date: 10/20/2022  [facility]  Study quality: Technically difficult  Referring Physician: Talat Royal MD  Blood Pressure: 148/105 mmHg  Height: 165 cm  Weight: 68 kg  BSA: 1.8 m2  Heart Rhythm: Atrial fibrillation  ------------------------------------------------------------------------  PROCEDURE: Transthoracic echocardiogram with 2-D, M-Mode  and complete spectral and color flow Doppler. Verbal  consent was obtained for injection of  Ultrasonic Enhancing  Agent following a discussion of risks and benefits.  Following intravenous injection of Ultrasonic Enhancing  Agent, harmonic imaging was performed.  INDICATION: Other cerebrovascular disease (I67.89)  ------------------------------------------------------------------------  Dimensions:    Normal Values:  LA:     4.6    2.0 - 4.0 cm  Ao:     3.8    2.0 - 3.8 cm  SEPTUM: 1.2    0.6 - 1.2 cm  PWT:1.0    0.6 - 1.1 cm  LVIDd:  3.8    3.0 - 5.6 cm  LVIDs:  2.5    1.8 - 4.0 cm  Derived variables:  LVMI: 77 g/m2  RWT: 0.52  EF (Visual Estimate): 65 %  Doppler Peak Velocity (m/sec): AoV=0.9  ------------------------------------------------------------------------  Observations:  Mitral Valve: Normal mitral valve.  Aortic Valve/Aorta: Normal aortic valve. .  Normal aortic root size.  Left Atrium: Normal left atrium.  Left Ventricle: Endocardial visualization enhanced with  intravenous injection of Ultrasonic Enhancing Agent  (Definity).  Normal left ventricular internal dimensions and wall  thicknesses.  Normal left ventricular systolic function. No segmental  wall motion abnormalities.  Right Heart: Normal right atrium. Normal right ventricular  size and function.  Normal tricuspid valve. Normal pulmonic valve.  Pericardium/Pleura: Normal pericardium with no pericardial  effusion.  Hemodynamic: No evidence of pulmoanry hypertension.  ------------------------------------------------------------------------  Conclusions:  Endocardial visualization enhanced with intravenous  injection of Ultrasonic Enhancing Agent (Definity).  Normal left ventricular systolic function. No segmental  wall motion abnormalities.  ------------------------------------------------------------------------  Confirmed on  10/20/2022 - 15:27:02 by Luis Fernando Gutierrez MD, FASE  ------------------------------------------------------------------------    ACC: [id number] EXAM:  CT CHEST                          PROCEDURE DATE:  01/07/2023        INTERPRETATION:  CLINICAL INFORMATION: Cough, fever    COMPARISON: 6/30/2022    CONTRAST/COMPLICATIONS:  IV Contrast: NONE  Oral Contrast: NONE  Complications: None reported at time of study completion    PROCEDURE:  CT of the Chest was performed.  Sagittal and coronal reformats were performed.    FINDINGS:    LUNGS AND AIRWAYS: Patent central airways.  No consolidation or   suspicious pulmonary lesions.Minimal peribronchial mural prominence,   exaggerated by motion unsharpness. Millimeter size pleural-based LLL   granuloma  PLEURA: No pleural effusion.  MEDIASTINUM AND ALYSSA: No lymphadenopathy.  VESSELS: No aortic dilatation. Aortic /coronary artery calcification.  HEART: Cardiomegaly . Small pericardial effusion.  CHEST WALL AND LOWER NECK: Gynecomastia  VISUALIZED UPPER ABDOMEN: Within normal limits.  BONES: Thoracic spondylosis    IMPRESSION:  1.  No airspace opacities or pleural effusions    --- End of Report ---    DOMINIQUE MCMILLAN MD; Attending Radiologist  This document has been electronically signed. Jan 7 2023 11:37AM    Ventricular Rate 118 BPM    QRS Duration 82 ms    Q-T Interval 266 ms    QTC Calculation(Bazett) 372 ms    R Axis 264 degrees    T Axis 36 degrees    Diagnosis Line Atrial fibrillation  Right ventricular hypertrophy  Left axis deviation  Anterior infarct (cited on or before 22-MAY-2022)  Abnormal ECG  Confirmed by mira Barton ([id number]) on 1/7/2023 1:02:00 PM

## 2023-01-10 NOTE — PROGRESS NOTE ADULT - ASSESSMENT
73 y/o M with past medical history of hypertension, hyperlipidemia,, TBI, subdural hematoma s/p craniotomy in August 2021, atrial fibrillation, LLE DVT  brought in by ambulance from home due to fever and cough.  Patient was found to be feverish and lethargic brought to ed for further evaluation. . At baseline, pt is disabled since his TBI in August 2021. Pt was found to be Flu+ in the ED .     Afib with RVR  - Tachycardia, likely, in the setting of respiratory infection  - EKG showed AF 118bpm old anterior infarct LAD , troponin negative   - Mostly rate-controlled Afib on tele.  Can D/C tele  - Continue Eliquis for  persistent DVT.  Was previously taken off of AC in the setting of Hx of SDH  - Continue CCB but would increase to 90 mg q6H if needed    - BP is labile at systolic 120-160  - Continue Hydralazine and Losartan for now.  Would not increase anti-HTN for now to allow room for uptitration of CCB  - Echo 2022 as above normal LV function   - No evidence of cardiac ischemia or volume overload    - FLU A supportive care as per primary   - Monitor and replete electrolytes. Keep K>4.0 and Mg>2.0.  - Will continue to follow    Allison Perez DNP, NP-C  Cardiology   Spectra #[phone]     
71 y/o M with past medical history of hypertension, hyperlipidemia,, TBI, subdural hematoma s/p craniotomy in August 2021, atrial fibrillation, LLE DVT  brought in by ambulance from home due to fever and cough.  Patient was found to be feverish and lethargic brought to ed for further evaluation. . At baseline, pt is disabled since his TBI in August 2021. Pt was found to be Flu+ in the ED .     Afib with RVR  - Tachycardia, likely, in the setting of respiratory infection  - EKG showed AF 118bpm old anterior infarct LAD, troponin negative   - Rates now controlled.  Can D/C tele  - Continue Eliquis for  persistent DVT.  Was previously taken off of AC in the setting of Hx of SDH  - Continue CCB.  Can increase if needed    - BP controlled and stable  - Continue Hydralazine and Losartan for now.  Would not increase anti-HTN for now to allow room for uptitration of CCB  - Echo 2022 as above normal LV function   - No evidence of cardiac ischemia or volume overload    - FLU A supportive care as per primary   - Monitor and replete electrolytes. Keep K>4.0 and Mg>2.0.  - Will continue to follow    Allison Perez DNP, NP-C  Cardiology   Spectra #[phone]       
73 y/o M with PMH of HTN, HLD, CAD, TBI, atrial fibrillation (on apixaban) BIBA from home for fever, cough, lethargy w/ sick contact admitted for the management of metabolic encephalopathy 2/2 Influenza infection                
71 y/o M with PMH of HTN, HLD, CAD, TBI, atrial fibrillation (on apixaban) BIBA from home for fever, cough, lethargy w/ sick contact admitted for the management of metabolic encephalopathy 2/2 Influenza infection     Problem/Plan - 1:  ·  Problem: Influenza A.   ·  Plan: Febrile, cough, lethargy w/ sick contact; Influenza A +  - Metabolic encephalopathy 2/2 acute viral influenza  - s/p 1 dose of Tamiflu, continue BID for 5 days   - Supportive care  - Monitor respiratory status  - Robitussin and Tessalon pearls PRN for cough  - Albuterol inhalation 4x daily to open airway   - Tylenol PRN for fever  - Supplemental O2 if warranted  - Droplet precautions.     Problem/Plan - 2:  ·  Problem: Atrial fibrillation.   ·  Plan: EKG: Rate 118. Atrial fibrillation. Right ventricular hypertrophy. Left axis deviation. Anterior infarct  (noted on priors)  - Likely reactive in setting of Flu   - S/p 1x Cardizem 10 IVP in ED and 10 mg PO   - Takes at home Cardizem 120 mg CD qd, increased to 240 mg -- 60 mg q6 hr per cardio   - Replete mag and phos as needed  - Outpatient cardio Dr. Goldsmith  - Cardiology, Dr. Barton  - Telemonitor      Problem/Plan - 3:  ·  Problem: HTN (hypertension).   ·  Plan: Continue home hydralazine and losartan with hold parameters  Monitor hemodynamics.     Problem/Plan - 4:  ·  Problem: Motor system disease.   ·  Plan: Chronic, on ritalin 10 mg BID   - Fall and aspiration precautions   - PT consulted.     Problem/Plan - 5:  ·  Problem: Diabetes mellitus.   ·  Plan: Not on home insulin  - Hold oral diabetes meds  - Last A1c 9.3, November 23, 2022   - LDSS  - Finger sticks, Consistent carb diet (minced and moist  with thickened liquids)   - Speech and Swallow evaluation pending   - Hypoglycemic protocol.     Problem/Plan - 6:  ·  Problem: Peripheral neuropathy.   ·  Plan: Continue home gabapentin 300 mg TID with holding parameters for sedation/lethargy.     Problem/Plan - 7:  ·  Problem: Major depression.   ·  Plan: Chronic  - Continue home mirtazapine.     Problem/Plan - 8:  ·  Problem: DVT, lower extremity.   ·  Plan: - On home Eliquis.     Problem/Plan - 9:  ·  Problem: Prophylactic measure.   ·  Plan: Continue Eliquis 2.5 BID for DVT prophylaxis      #Home Meds  Continue latanoprost eye drops for eye pressure    #Dispo  - DNR/DNI from [facility], in chart.

## 2023-01-10 NOTE — DISCHARGE NOTE PROVIDER - HOSPITAL COURSE
HPI:  73 y/o M with past medical history of hypertension, hyperlipidemia, CAD, TBI, subdural hematoma s/p craniotomy in August 2021, atrial fibrillation (on Eliquis) brought in by ambulance from home due to fever and cough.  Patient's Wife present at bedside and reports pt began feeling sick with fever and cough midday on 1/6. Pt remained afebrile throughout the day, but was found to be feverish and lethargic this AM. At baseline, pt is disabled since his TBI in August 2021. Pt is able to feed himself with finger food, consumes mostly a thickened liquid/pureed diet, minimal communication, but able to answer yes or no questions, difficulty concentrating, wheelchair bound. Per wife, pt showed decreased engagement this morning and in the setting of fever, brought pt to the ED. Pt was found to be Flu+ in the ED and received 1x dose of Tamiflu. Of note, pt has not received any of his home medications today. Pt takes Eliquis for his afib, but was also found to have multiple DVTs following his last admission to [facility] for a subsequent brain bleed.      Denies vomiting, diarrhea, chest pain, respiratory distress, fall, head injury, or any other complaints    IN THE ED:  Temp   100.8 F ,  , /96 /  ,RR 18 , SpO2 97% on RA  S/P tyelnol x 1, diltiazem IVP 10mg x 1, rocephin x 1  EKG: Rate 118. Atrial fibrillation. Right ventricular hypertrophy. Left axis deviation. Anterior infarct (cited on or before 22-MAY-2022)  Labs significant for: Influenza A +. BNP 1424.  Imaging:   CXR - wet read - clear CXR  CT chest - No airspace opacities or pleural effusions  CT Head - Old infarcts as described above. Previously noted left-sided subdural collections are no longer seen. Previously noted subdural collection along the posterior and hemispheric region on the right side is decrease in size. Previously noted acute subdural hematoma along the demonstrate region is less conspicuous. (07 Jan 2023 17:13)      COURSE: HPI:  73 y/o M with past medical history of hypertension, hyperlipidemia, CAD, TBI, subdural hematoma s/p craniotomy in August 2021, atrial fibrillation (on Eliquis) brought in by ambulance from home due to fever and cough.  Patient's Wife present at bedside and reports pt began feeling sick with fever and cough midday on 1/6. Pt remained afebrile throughout the day, but was found to be feverish and lethargic this AM. At baseline, pt is disabled since his TBI in August 2021. Pt is able to feed himself with finger food, consumes mostly a thickened liquid/pureed diet, minimal communication, but able to answer yes or no questions, difficulty concentrating, wheelchair bound. Per wife, pt showed decreased engagement this morning and in the setting of fever, brought pt to the ED. Pt was found to be Flu+ in the ED and received 1x dose of Tamiflu. Of note, pt has not received any of his home medications today. Pt takes Eliquis for his afib, but was also found to have multiple DVTs following his last admission to [facility] for a subsequent brain bleed.      Denies vomiting, diarrhea, chest pain, respiratory distress, fall, head injury, or any other complaints    IN THE ED:  Temp   100.8 F ,  , /96 /  ,RR 18 , SpO2 97% on RA  S/P tyelnol x 1, diltiazem IVP 10mg x 1, rocephin x 1  EKG: Rate 118. Atrial fibrillation. Right ventricular hypertrophy. Left axis deviation. Anterior infarct (cited on or before 22-MAY-2022)  Labs significant for: Influenza A +. BNP 1424.  Imaging:   CXR - wet read - clear CXR  CT chest - No airspace opacities or pleural effusions  CT Head - Old infarcts as described above. Previously noted left-sided subdural collections are no longer seen. Previously noted subdural collection along the posterior and hemispheric region on the right side is decrease in size. Previously noted acute subdural hematoma along the demonstrate region is less conspicuous. (07 Jan 2023 17:13)      COURSE: Patient was hospitalized due to Influenza A related illness. He developed rapid atrial fibrillation likely as a result of acute   infection. He was seen in consultation by the cardiology team and monitored on telemetry.  His medications were adjusted and he required antiarrhythmics for control of rapid atrial fibrillation.   The hospital course of this patient was uncomplicated and the patient was discharged in stable medical condition.

## 2023-01-10 NOTE — DISCHARGE NOTE NURSING/CASE MANAGEMENT/SOCIAL WORK - PATIENT PORTAL LINK FT
You can access the FollowMyHealth Patient Portal offered by [facility] by registering at the following website: [email or web address]. By joining [facility]’s FollowMyHealth portal, you will also be able to view your health information using other applications (apps) compatible with our system.

## 2023-01-10 NOTE — DISCHARGE NOTE PROVIDER - ATTENDING DISCHARGE PHYSICAL EXAMINATION:
MEDICAL ATTENDING DISCHARGE NOTE :    Patient is a 72y old  Male who presents with a chief complaint of New onset afib (10 Eduin 2023 14:05)      INTERVAL HPI / OVERNIGHT EVENTS: patient with uneventful night and offers no new complaints    ----------------------------------------------------------------------------------  REVIEW OF SYSTEMS: no fever; no SOB      Vital Signs Last 24 Hrs  T(C): 36.6 (10 Eduin 2023 14:28), Max: 36.8 (10 Eduin 2023 11:09)  T(F): 97.8 (10 Eduin 2023 14:28), Max: 98.3 (10 Eduin 2023 11:09)  HR: 100 (10 Eduin 2023 14:28) (78 - 111)  BP: 115/77 (10 Eduin 2023 14:28) (113/78 - 153/96)  BP(mean): --  RR: 18 (10 Eduin 2023 14:28) (18 - 18)  SpO2: 94% (10 Eduin 2023 14:28) (94% - 96%)    Parameters below as of 10 Eduin 2023 14:28  Patient On (Oxygen Delivery Method): room air        _________________  PHYSICAL EXAM:  ---------------------------   NAD; Normocephalic  LUNGS - no wheezing  HEART: S1 S2+   ABDOMEN: Soft, Nontender, non distended  EXTREMITIES: no cyanosis; no edema      _________________________________________________  LABS:    A/P: 73 y/o M with PMH of HTN, HLD, CAD, TBI, atrial fibrillation (on apixaban) BIBA from home for fever, cough, lethargy w/ sick contact admitted for the management of metabolic encephalopathy due to Influenza infection-      Plan of care, test results and findings were  discussed with patient; all questions and concerns were addressed and care was aligned with patient's wishes.  PMD follow up after discharge from the hospital for continued care and outpatient monitoring was advised.  Discharge plans was discussed with care team including the nursing staff  and unit discharge planner.

## 2023-01-10 NOTE — DISCHARGE NOTE PROVIDER - NSDCMRMEDTOKEN_GEN_ALL_CORE_FT
apixaban 2.5 mg oral tablet: 1 tab(s) orally 2 times a day  dilTIAZem 180 mg/24 hours oral capsule, extended release: 1 cap(s) orally once a day  fluticasone 50 mcg/inh nasal spray: 1 spray(s) nasal 2 times a day, As needed, nasal congestion  gabapentin 300 mg oral tablet: 1 tab(s) orally 3 times a day  guaiFENesin 100 mg/5 mL oral liquid: 10 milliliter(s) orally every 6 hours  hydrALAZINE 50 mg oral tablet: 50 milligram(s) orally 3 times a day  latanoprost 0.005% ophthalmic solution: 1 drop(s) to each affected eye once a day (in the evening)  losartan 50 mg oral tablet: 50 milligram(s) orally 2 times a day  metFORMIN 1000 mg oral tablet: 1 tab(s) orally 2 times a day   Methylphenidate Hydrochloride CD 20 mg/24 hr oral capsule, extended release: 1 cap(s) orally once a day (in the morning)  mirtazapine 7.5 mg oral tablet: 1 tab(s) orally once a day (at bedtime)  mupirocin 2% topical ointment: Apply topically to affected area 2 times a day  oseltamivir 75 mg oral capsule: 1 cap(s) orally 2 times a day

## 2023-01-10 NOTE — DISCHARGE NOTE PROVIDER - CARE PROVIDER_API CALL
Richard Mathis (MD)  Gastroenterology; Internal Medicine  [address]  [place] [address]  Phone: [phone]  Fax: [phone]  Follow Up Time:

## 2023-01-10 NOTE — DISCHARGE NOTE PROVIDER - NSDCCPCAREPLAN_GEN_ALL_CORE_FT
PRINCIPAL DISCHARGE DIAGNOSIS  Diagnosis: Influenza A  Assessment and Plan of Treatment: You were hospitalized due to acute infection with Infuenza.. You responded to treatment and your symptoms are improved.  please follow up with mara chao one week of discharge for continued care and monitoring      SECONDARY DISCHARGE DIAGNOSES  Diagnosis: Atrial fibrillation  Assessment and Plan of Treatment: You were noted to be in rapid atrial fibrillation which likely occured as a result of acute infection. Your heart rate is now better controlled.  Continue your cardiac medications. Your Dilatiazem dose was increased from 120mg to 180 mg. Please follow up with your Cardiaologist within 1 week of discharge for continued care and monitroing

## 2023-01-10 NOTE — PROGRESS NOTE ADULT - NS ATTEND AMEND GEN_ALL_CORE FT
AF rates better controlled.  Can increase diltiazem if needed.  To follow closely while admitted.  Continue remainder of medications as written.
No signs of significant ischemia or volume overload. cont current care. Further cardiac workup will depend on clinical course.

## 2023-01-11 ENCOUNTER — NON-APPOINTMENT (OUTPATIENT)
Age: 73
End: 2023-01-11

## 2023-01-12 LAB
CULTURE RESULTS: SIGNIFICANT CHANGE UP
CULTURE RESULTS: SIGNIFICANT CHANGE UP
SPECIMEN SOURCE: SIGNIFICANT CHANGE UP
SPECIMEN SOURCE: SIGNIFICANT CHANGE UP

## 2023-01-18 ENCOUNTER — APPOINTMENT (OUTPATIENT)
Dept: VASCULAR SURGERY | Facility: CLINIC | Age: 73
End: 2023-01-18
Payer: COMMERCIAL

## 2023-01-18 ENCOUNTER — APPOINTMENT (OUTPATIENT)
Dept: CARDIOLOGY | Facility: CLINIC | Age: 73
End: 2023-01-18
Payer: COMMERCIAL

## 2023-01-18 ENCOUNTER — NON-APPOINTMENT (OUTPATIENT)
Age: 73
End: 2023-01-18

## 2023-01-18 VITALS
BODY MASS INDEX: 20.4 KG/M2 | HEART RATE: 90 BPM | SYSTOLIC BLOOD PRESSURE: 115 MMHG | DIASTOLIC BLOOD PRESSURE: 79 MMHG | WEIGHT: 130 LBS | HEIGHT: 67 IN

## 2023-01-18 VITALS
BODY MASS INDEX: 20.4 KG/M2 | SYSTOLIC BLOOD PRESSURE: 122 MMHG | WEIGHT: 130 LBS | OXYGEN SATURATION: 97 % | HEIGHT: 67 IN | DIASTOLIC BLOOD PRESSURE: 89 MMHG | HEART RATE: 103 BPM

## 2023-01-18 VITALS — DIASTOLIC BLOOD PRESSURE: 60 MMHG | SYSTOLIC BLOOD PRESSURE: 110 MMHG

## 2023-01-18 DIAGNOSIS — E78.00 PURE HYPERCHOLESTEROLEMIA, UNSPECIFIED: ICD-10-CM

## 2023-01-18 PROCEDURE — 99215 OFFICE O/P EST HI 40 MIN: CPT

## 2023-01-18 PROCEDURE — 99203 OFFICE O/P NEW LOW 30 MIN: CPT

## 2023-01-18 PROCEDURE — 93000 ELECTROCARDIOGRAM COMPLETE: CPT

## 2023-01-18 NOTE — HISTORY OF PRESENT ILLNESS
[FreeTextEntry1] : 71 yo M sent for eval of LLE DVT. Rex is non ambulatory secondary to a head bleed about a year ago. He used to be on AC for A Fib but was eventually held when ICH happened. He has been wheelchair bound ever since. He denies LE swelling. Last admission to [facility]-Influenza was uneventful but during admission a LLE US revealed extensive DVT involving up to L EIV. He was started on Apixaban.

## 2023-01-18 NOTE — PHYSICAL EXAM
[Ankle Swelling (On Exam)] : present [Ankle Swelling Bilaterally] : bilaterally  [Ankle Swelling On The Left] : moderate

## 2023-01-18 NOTE — REASON FOR VISIT
[Follow-Up - Clinic] : a clinic follow-up of [Anticoagulation] : anticoagulation [Atrial Fibrillation] : atrial fibrillation [Medication Management] : Medication management [CV Risk Factors and Non-Cardiac Disease] : CV risk factors and non-cardiac disease [Arrhythmia/ECG Abnorrmalities] : arrhythmia/ECG abnormalities [Hyperlipidemia] : hyperlipidemia [Hypertension] : hypertension

## 2023-01-23 NOTE — CARDIOLOGY SUMMARY
[de-identified] : poor baseline\par Atrial  fibrillation  \par LAFB low voltage. \par nonspecific T wave changes\par  [de-identified] : 7/02/20 nromal LV function mild LVH \par 6/2022 noraml LV function -EF 55 to 60%\par Mild concentric LVH

## 2023-01-23 NOTE — DISCUSSION/SUMMARY
[FreeTextEntry1] : 72 year old man with HTN, HLD, DM and permanent atrial fibrillation who presents to the office for follow-up. Rex unfortunately has history of recurrent ICH. He was previously off of AC, however, due to the development of extensive DVTs, he is now back on full dose Eliquis. \par He is in no acute distress at today's visit.  ECG illustrates atrial fibrillation with rate of 100 bpm.  \par \par His blood pressure is controlled, He will continue Hydralazine 50mg q8 and losartan 50mg q12.\par He will continue Cardizem 180mg Qday for rate control.  He will continue eliquis 5mg for thromboembolic prevention.  He unfortunately is not a good candidate for watchman procedure.  Because of his history of recurrent intra-cranial bleeds, I advised he follow up with neurology/neuro surgery to again assess and discuss risk/benefit of continued oral anticoagulation.\par \par He has been evaluated by vascular regarding DVT management.   \par \par He will continue his Crestor and Vascepa for mixed hyperlipidemia, goal LDL <70.\par \par He will follow with me in 3 months.

## 2023-01-23 NOTE — REVIEW OF SYSTEMS
[Negative] : Heme/Lymph [FreeTextEntry2] : in  [FreeTextEntry9] : Wheelchair-bound [de-identified] : No new focal symptoms

## 2023-01-23 NOTE — PHYSICAL EXAM
[General Appearance - In No Acute Distress] : no acute distress [Normal Conjunctiva] : the conjunctiva exhibited no abnormalities [Eyelids - No Xanthelasma] : the eyelids demonstrated no xanthelasmas [Normal Oral Mucosa] : normal oral mucosa [No Oral Pallor] : no oral pallor [No Oral Cyanosis] : no oral cyanosis [Normal Jugular Venous V Waves Present] : normal jugular venous V waves present [Respiration, Rhythm And Depth] : normal respiratory rhythm and effort [Exaggerated Use Of Accessory Muscles For Inspiration] : no accessory muscle use [Auscultation Breath Sounds / Voice Sounds] : lungs were clear to auscultation bilaterally [Bowel Sounds] : normal bowel sounds [Abdomen Soft] : soft [Abdomen Tenderness] : non-tender [Nail Clubbing] : no clubbing of the fingernails [Cyanosis, Localized] : no localized cyanosis [Petechial Hemorrhages (___cm)] : no petechial hemorrhages [Skin Color & Pigmentation] : normal skin color and pigmentation [] : no rash [Oriented To Time, Place, And Person] : oriented to person, place, and time [Affect] : the affect was normal [Mood] : the mood was normal [No Anxiety] : not feeling anxious [Not Palpable] : not palpable [No Precordial Heave] : no precordial heave was noted [Normal Rate] : normal [Irregularly Irregular] : irregularly irregular [No Gallop] : no gallop heard [2+] : left 2+ [No Acute Distress] : no acute distress [Normal Venous Pressure] : normal venous pressure [Rhythm Regular] : regular [Normal S1] : normal S1 [Normal S2] : normal S2 [No Murmur] : no murmurs heard [No Pitting Edema] : no pitting edema present [No Abnormalities] : the abdominal aorta was not enlarged and no bruit was heard [Soft] : abdomen soft [Non Tender] : non-tender [No Edema] : no edema [Normal] : alert and oriented, normal memory [de-identified] : Immobile [FreeTextEntry1] :  in wheelchair [Right Carotid Bruit] : no bruit heard over the right carotid [Left Carotid Bruit] : no bruit heard over the left carotid [Bruit] : no bruit heard

## 2023-01-23 NOTE — END OF VISIT
[FreeTextEntry3] : I saw and evaluated the patient and discussed the care with the NP provider above on 01/18/2023 . I agree with the findings and plan as documented in the note above. I personally reviewed all of the hospital records available to me at this time, which included but are not limited to the discharge summary, labs and imaging reports.  has extensive dvts. will need to cont ac despite ICH.

## 2023-01-23 NOTE — HISTORY OF PRESENT ILLNESS
[FreeTextEntry1] : 72 year old man with a history of atrial fibrillation, SDH HTN, HLD, allergic conjunctivitis, neuropathy, SDH s/p mechanical fall, recurrent ICH when on AC. \par  \par \par Since his last visit, he was admitted to  with nausea and found to have CTH showed L IPH with parafalcine SDH. I personally reviewed all of the hospital records available to me at this time, which included but are not limited to the discharge summary, labs and imaging reports. \par \par He was recently admitted into [facility] from 1/7/23- 1/10/23 due to flu A.  Incidentally he was found to have an extensive DVT of the left leg.  He was placed back on Eliquis full dose of 5 mg twice a day.  Hospital course was complicated by atrial fibrillation with rapid ventricular response, Cardizem dosing was increased to 180 mg daily.\par He arrives today with his wife.  She denies any new concerning symptoms.  His orientation is at baseline, he is eating normally.\par He denies palpitations, dyspnea, PND, orthopnea, lower extremity edema, LOC.  No reported melena, hematochezia or hematemesis. \par

## 2023-01-25 ENCOUNTER — RX RENEWAL (OUTPATIENT)
Age: 73
End: 2023-01-25

## 2023-02-03 ENCOUNTER — NON-APPOINTMENT (OUTPATIENT)
Age: 73
End: 2023-02-03

## 2023-02-15 ENCOUNTER — APPOINTMENT (OUTPATIENT)
Dept: INTERNAL MEDICINE | Facility: CLINIC | Age: 73
End: 2023-02-15
Payer: COMMERCIAL

## 2023-02-15 VITALS
HEART RATE: 88 BPM | RESPIRATION RATE: 15 BRPM | SYSTOLIC BLOOD PRESSURE: 126 MMHG | DIASTOLIC BLOOD PRESSURE: 78 MMHG | BODY MASS INDEX: 20.4 KG/M2 | OXYGEN SATURATION: 98 % | HEIGHT: 67 IN | WEIGHT: 130 LBS | TEMPERATURE: 98 F

## 2023-02-15 DIAGNOSIS — G91.9 HYDROCEPHALUS, UNSPECIFIED: ICD-10-CM

## 2023-02-15 PROCEDURE — 99213 OFFICE O/P EST LOW 20 MIN: CPT

## 2023-02-15 RX ORDER — COLLAGENASE SANTYL 250 [ARB'U]/G
250 OINTMENT TOPICAL
Qty: 1 | Refills: 3 | Status: ACTIVE | COMMUNITY
Start: 2023-02-15 | End: 1900-01-01

## 2023-02-15 NOTE — HISTORY OF PRESENT ILLNESS
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

## 2023-02-15 NOTE — PHYSICAL EXAM
[Normal Rate] : normal rate  [Normal] : soft, non-tender, non-distended, no masses palpated, no HSM and normal bowel sounds [de-identified] : contracture [de-identified] : not orieted or verbal

## 2023-02-15 NOTE — PLAN
[FreeTextEntry1] : Unable to draw blood\par s/p brai injury\par PT/OT\par WOund care\par frequent rolling to prevent treat worsening of decubiti\par on Eliques\par Prognosis poor

## 2023-02-16 ENCOUNTER — LABORATORY RESULT (OUTPATIENT)
Age: 73
End: 2023-02-16

## 2023-02-24 ENCOUNTER — OUTPATIENT (OUTPATIENT)
Dept: OUTPATIENT SERVICES | Facility: HOSPITAL | Age: 73
LOS: 1 days | Discharge: HOME | End: 2023-02-24
Payer: COMMERCIAL

## 2023-02-24 ENCOUNTER — APPOINTMENT (OUTPATIENT)
Dept: WOUND CARE | Facility: HOSPITAL | Age: 73
End: 2023-02-24
Payer: COMMERCIAL

## 2023-02-24 VITALS
TEMPERATURE: 97.6 F | OXYGEN SATURATION: 97 % | RESPIRATION RATE: 20 BRPM | HEART RATE: 107 BPM | SYSTOLIC BLOOD PRESSURE: 140 MMHG | DIASTOLIC BLOOD PRESSURE: 90 MMHG

## 2023-02-24 DIAGNOSIS — Z86.79 PERSONAL HISTORY OF OTHER DISEASES OF THE CIRCULATORY SYSTEM: ICD-10-CM

## 2023-02-24 DIAGNOSIS — L89.150 PRESSURE ULCER OF SACRAL REGION, UNSTAGEABLE: ICD-10-CM

## 2023-02-24 DIAGNOSIS — Z98.890 OTHER SPECIFIED POSTPROCEDURAL STATES: Chronic | ICD-10-CM

## 2023-02-24 PROCEDURE — 99203 OFFICE O/P NEW LOW 30 MIN: CPT

## 2023-02-24 PROCEDURE — G0463: CPT | Mod: 25

## 2023-02-24 PROCEDURE — 97602 WOUND(S) CARE NON-SELECTIVE: CPT

## 2023-02-24 RX ORDER — BETHANECHOL CHLORIDE 10 MG/1
10 TABLET ORAL
Qty: 270 | Refills: 3 | Status: COMPLETED | COMMUNITY
Start: 2022-03-30 | End: 2023-02-24

## 2023-02-24 RX ORDER — TIZANIDINE 4 MG/1
4 TABLET ORAL
Qty: 90 | Refills: 5 | Status: COMPLETED | COMMUNITY
Start: 2022-04-18 | End: 2023-02-24

## 2023-02-24 RX ORDER — GABAPENTIN 400 MG/1
400 CAPSULE ORAL 3 TIMES DAILY
Refills: 0 | Status: ACTIVE | COMMUNITY

## 2023-02-24 RX ORDER — BUPROPION HYDROCHLORIDE 75 MG/1
75 TABLET, FILM COATED ORAL
Qty: 180 | Refills: 1 | Status: COMPLETED | COMMUNITY
Start: 2022-05-11 | End: 2023-02-24

## 2023-02-24 RX ORDER — MULTIVIT-MIN/FOLIC/VIT K/LYCOP 400-300MCG
25 MCG TABLET ORAL DAILY
Qty: 90 | Refills: 0 | Status: COMPLETED | COMMUNITY
Start: 2022-06-24 | End: 2023-02-24

## 2023-02-24 RX ORDER — DILTIAZEM HYDROCHLORIDE 180 MG/1
180 CAPSULE, EXTENDED RELEASE ORAL
Qty: 90 | Refills: 1 | Status: COMPLETED | COMMUNITY
Start: 2022-06-24 | End: 2023-02-24

## 2023-02-24 RX ORDER — MIRTAZAPINE 7.5 MG/1
7.5 TABLET, FILM COATED ORAL
Refills: 0 | Status: ACTIVE | COMMUNITY

## 2023-02-24 RX ORDER — TAMSULOSIN HYDROCHLORIDE 0.4 MG/1
0.4 CAPSULE ORAL
Qty: 90 | Refills: 3 | Status: COMPLETED | COMMUNITY
Start: 2022-03-30 | End: 2023-02-24

## 2023-02-24 RX ORDER — GABAPENTIN 300 MG/1
300 CAPSULE ORAL
Qty: 60 | Refills: 5 | Status: COMPLETED | COMMUNITY
Start: 2022-04-18 | End: 2023-02-24

## 2023-02-24 RX ORDER — MULTIVIT-MIN/FA/LYCOPEN/LUTEIN .4-300-25
TABLET ORAL
Refills: 0 | Status: ACTIVE | COMMUNITY

## 2023-02-25 ENCOUNTER — NON-APPOINTMENT (OUTPATIENT)
Age: 73
End: 2023-02-25

## 2023-02-25 DIAGNOSIS — D72.9 DISORDER OF WHITE BLOOD CELLS, UNSPECIFIED: ICD-10-CM

## 2023-02-25 DIAGNOSIS — Z86.79 PERSONAL HISTORY OF OTHER DISEASES OF THE CIRCULATORY SYSTEM: ICD-10-CM

## 2023-02-25 DIAGNOSIS — L89.150 PRESSURE ULCER OF SACRAL REGION, UNSTAGEABLE: ICD-10-CM

## 2023-02-25 DIAGNOSIS — Z85.820 PERSONAL HISTORY OF MALIGNANT MELANOMA OF SKIN: ICD-10-CM

## 2023-02-25 DIAGNOSIS — E83.42 HYPOMAGNESEMIA: ICD-10-CM

## 2023-02-25 DIAGNOSIS — E55.9 VITAMIN D DEFICIENCY, UNSPECIFIED: ICD-10-CM

## 2023-02-25 DIAGNOSIS — I10 ESSENTIAL (PRIMARY) HYPERTENSION: ICD-10-CM

## 2023-02-25 DIAGNOSIS — Z83.3 FAMILY HISTORY OF DIABETES MELLITUS: ICD-10-CM

## 2023-02-25 DIAGNOSIS — Z83.438 FAMILY HISTORY OF OTHER DISORDER OF LIPOPROTEIN METABOLISM AND OTHER LIPIDEMIA: ICD-10-CM

## 2023-02-25 DIAGNOSIS — N40.0 BENIGN PROSTATIC HYPERPLASIA WITHOUT LOWER URINARY TRACT SYMPTOMS: ICD-10-CM

## 2023-02-25 DIAGNOSIS — Z82.49 FAMILY HISTORY OF ISCHEMIC HEART DISEASE AND OTHER DISEASES OF THE CIRCULATORY SYSTEM: ICD-10-CM

## 2023-02-25 DIAGNOSIS — K21.9 GASTRO-ESOPHAGEAL REFLUX DISEASE WITHOUT ESOPHAGITIS: ICD-10-CM

## 2023-02-25 DIAGNOSIS — Z86.718 PERSONAL HISTORY OF OTHER VENOUS THROMBOSIS AND EMBOLISM: ICD-10-CM

## 2023-02-25 DIAGNOSIS — K44.9 DIAPHRAGMATIC HERNIA WITHOUT OBSTRUCTION OR GANGRENE: ICD-10-CM

## 2023-02-25 DIAGNOSIS — E11.40 TYPE 2 DIABETES MELLITUS WITH DIABETIC NEUROPATHY, UNSPECIFIED: ICD-10-CM

## 2023-02-25 DIAGNOSIS — Z79.01 LONG TERM (CURRENT) USE OF ANTICOAGULANTS: ICD-10-CM

## 2023-02-25 DIAGNOSIS — G91.9 HYDROCEPHALUS, UNSPECIFIED: ICD-10-CM

## 2023-02-25 DIAGNOSIS — Z86.010 PERSONAL HISTORY OF COLONIC POLYPS: ICD-10-CM

## 2023-02-25 DIAGNOSIS — Z98.890 OTHER SPECIFIED POSTPROCEDURAL STATES: ICD-10-CM

## 2023-02-25 DIAGNOSIS — Z79.84 LONG TERM (CURRENT) USE OF ORAL HYPOGLYCEMIC DRUGS: ICD-10-CM

## 2023-02-25 DIAGNOSIS — Z80.8 FAMILY HISTORY OF MALIGNANT NEOPLASM OF OTHER ORGANS OR SYSTEMS: ICD-10-CM

## 2023-02-25 DIAGNOSIS — Z82.0 FAMILY HISTORY OF EPILEPSY AND OTHER DISEASES OF THE NERVOUS SYSTEM: ICD-10-CM

## 2023-02-25 DIAGNOSIS — E53.8 DEFICIENCY OF OTHER SPECIFIED B GROUP VITAMINS: ICD-10-CM

## 2023-02-25 DIAGNOSIS — E78.00 PURE HYPERCHOLESTEROLEMIA, UNSPECIFIED: ICD-10-CM

## 2023-02-25 DIAGNOSIS — Z79.899 OTHER LONG TERM (CURRENT) DRUG THERAPY: ICD-10-CM

## 2023-02-28 RX ORDER — COLLAGENASE SANTYL 250 [ARB'U]/G
250 OINTMENT TOPICAL DAILY
Qty: 1 | Refills: 1 | Status: ACTIVE | COMMUNITY
Start: 2023-02-28 | End: 1900-01-01

## 2023-02-28 NOTE — REVIEW OF SYSTEMS
[Negative] : Psychiatric [FreeTextEntry2] : non ambulatory [FreeTextEntry5] : A Fib,hypertension, dyspnea on exertion,hyperlipidemia [FreeTextEntry7] : GERD, hiatal hernia, GI polyp ascending colon [FreeTextEntry8] : BPH [de-identified] : sacral pressure ulcer [de-identified] : intercerebral bleed [de-identified] : DM2 with neuropathy [de-identified] : polycyothemia

## 2023-02-28 NOTE — HISTORY OF PRESENT ILLNESS
[FreeTextEntry1] : Pt c/o sacral wound noted approximately middle of January 2023. Pt receives visiting nurse services, nurse recommended the wound be evaluated; pt spouse notified pt PCP, and was referred to [facility] for further evaluation. Pt spouse states pt has a hospital bed/air mattress, but is unable to state which type - pt spouse will bring in name of hospital air mattress at next visit. Pt spouse states pt has a "special cushion" for wheel chair, states "I don't think it's a ROHO cushion." Pt denies pain at present. VSN conducts turns and repositioning at home.

## 2023-02-28 NOTE — PLAN
[FreeTextEntry1] : off load sacral area\par turn frequently\par ROHO cushion\par nutritionist appointment\par collagenase,CaAlginjayne,DD,QD to sacral ulcer necrotic tissue\par authorization for debridement\par return 2 weeks\par 35 minutes spent in review,evaluation and treatment planning\par

## 2023-02-28 NOTE — PHYSICAL EXAM
[Alert] : alert [Oriented to Person] : oriented to person [Calm] : calm [4 x 4] : 4 x 4  [Abdominal Pad] : Abdominal Pad [de-identified] : WD WN 74 Y/O white male in NAD [de-identified] : sacral ulcer with periwound maceration and central necrotic tissue [FreeTextEntry1] : Sacrum [FreeTextEntry2] : 4.0 [FreeTextEntry3] : 2.0 [FreeTextEntry4] : 0.9 [de-identified] : serosanguineous  [de-identified] : 0.8-1.3 @ 11-2 o'clock [de-identified] : macerated/erythema [de-identified] : >50% [de-identified] : Collagenase and Calcium Alginate [de-identified] : Mechanically cleansed with  0.9% normal saline and 4 x 4 sterile gauze. \par \par Medipore [TWNoteComboBox1] : Midline [TWNoteComboBox4] : Moderate [TWNoteComboBox5] : Yes [TWNoteComboBox6] : Pressure [de-identified] : No [de-identified] : other [de-identified] : None [de-identified] : <20% [de-identified] : <20% [de-identified] : Yes [de-identified] : Daily [de-identified] : Primary Dressing

## 2023-02-28 NOTE — ASSESSMENT
[Verbal] : Verbal [Written] : Written [Demo] : Demo [Patient] : Patient [Spouse] : Spouse [Caregiver] : Caregiver [Good - alert, interested, motivated] : Good - alert, interested, motivated [Verbalizes knowledge/Understanding] : Verbalizes knowledge/understanding [Dressing changes] : dressing changes [Pressure relief] : pressure relief [Signs and symptoms of infection] : sign and symptoms of infection [How and When to Call] : how and when to call [Off-loading] : off-loading [Patient responsibility to plan of care] : patient responsibility to plan of care [] : Yes [Stable] : stable [Home] : Home [Wheelchair] : Wheelchair [Not Applicable - Long Term Care/Home Health Agency] : Long Term Care/Home Health Agency: Not Applicable [FreeTextEntry2] : Infection Prevention\par Promote/Restore Skin Integrity\par Nutritious Diet to Promote Wound Healing\par Localized Wound Care\par Turn and Reposition\par Protect/Guard Wound Site\par Off-load and Pressure Relief\par F/U in 2 weeks [FreeTextEntry3] : Initial Visit [FreeTextEntry4] : Pt spouse states pt has hospital bed/air mattress with alternating pressure relieving overlay; MD discussed importance of using pressure relieving devices and turning and repositioning.\par Supplies requested submitted.\par ROHO cushion Rx submitted.\par Sharps debridement for next visit; auth submitted.\par Nutritional Consult request submitted.\par F/U 2 weeks

## 2023-03-08 ENCOUNTER — RX RENEWAL (OUTPATIENT)
Age: 73
End: 2023-03-08

## 2023-03-09 ENCOUNTER — NON-APPOINTMENT (OUTPATIENT)
Age: 73
End: 2023-03-09

## 2023-03-10 ENCOUNTER — APPOINTMENT (OUTPATIENT)
Dept: WOUND CARE | Facility: HOSPITAL | Age: 73
End: 2023-03-10
Payer: COMMERCIAL

## 2023-03-10 ENCOUNTER — OUTPATIENT (OUTPATIENT)
Dept: OUTPATIENT SERVICES | Facility: HOSPITAL | Age: 73
LOS: 1 days | Discharge: HOME | End: 2023-03-10
Payer: COMMERCIAL

## 2023-03-10 VITALS
TEMPERATURE: 97.9 F | SYSTOLIC BLOOD PRESSURE: 148 MMHG | HEIGHT: 67 IN | DIASTOLIC BLOOD PRESSURE: 99 MMHG | OXYGEN SATURATION: 97 % | WEIGHT: 130 LBS | BODY MASS INDEX: 20.4 KG/M2 | RESPIRATION RATE: 20 BRPM | HEART RATE: 110 BPM

## 2023-03-10 DIAGNOSIS — Z98.890 OTHER SPECIFIED POSTPROCEDURAL STATES: Chronic | ICD-10-CM

## 2023-03-10 DIAGNOSIS — L89.150 PRESSURE ULCER OF SACRAL REGION, UNSTAGEABLE: ICD-10-CM

## 2023-03-10 PROCEDURE — 97602 WOUND(S) CARE NON-SELECTIVE: CPT

## 2023-03-10 PROCEDURE — 99213 OFFICE O/P EST LOW 20 MIN: CPT

## 2023-03-13 DIAGNOSIS — Z80.8 FAMILY HISTORY OF MALIGNANT NEOPLASM OF OTHER ORGANS OR SYSTEMS: ICD-10-CM

## 2023-03-13 DIAGNOSIS — E53.8 DEFICIENCY OF OTHER SPECIFIED B GROUP VITAMINS: ICD-10-CM

## 2023-03-13 DIAGNOSIS — Z79.84 LONG TERM (CURRENT) USE OF ORAL HYPOGLYCEMIC DRUGS: ICD-10-CM

## 2023-03-13 DIAGNOSIS — Z83.3 FAMILY HISTORY OF DIABETES MELLITUS: ICD-10-CM

## 2023-03-13 DIAGNOSIS — Z86.718 PERSONAL HISTORY OF OTHER VENOUS THROMBOSIS AND EMBOLISM: ICD-10-CM

## 2023-03-13 DIAGNOSIS — Z98.890 OTHER SPECIFIED POSTPROCEDURAL STATES: ICD-10-CM

## 2023-03-13 DIAGNOSIS — Z82.49 FAMILY HISTORY OF ISCHEMIC HEART DISEASE AND OTHER DISEASES OF THE CIRCULATORY SYSTEM: ICD-10-CM

## 2023-03-13 DIAGNOSIS — D72.9 DISORDER OF WHITE BLOOD CELLS, UNSPECIFIED: ICD-10-CM

## 2023-03-13 DIAGNOSIS — E55.9 VITAMIN D DEFICIENCY, UNSPECIFIED: ICD-10-CM

## 2023-03-13 DIAGNOSIS — G91.9 HYDROCEPHALUS, UNSPECIFIED: ICD-10-CM

## 2023-03-13 DIAGNOSIS — E78.00 PURE HYPERCHOLESTEROLEMIA, UNSPECIFIED: ICD-10-CM

## 2023-03-13 DIAGNOSIS — E11.40 TYPE 2 DIABETES MELLITUS WITH DIABETIC NEUROPATHY, UNSPECIFIED: ICD-10-CM

## 2023-03-13 DIAGNOSIS — Z85.820 PERSONAL HISTORY OF MALIGNANT MELANOMA OF SKIN: ICD-10-CM

## 2023-03-13 DIAGNOSIS — I10 ESSENTIAL (PRIMARY) HYPERTENSION: ICD-10-CM

## 2023-03-13 DIAGNOSIS — Z86.010 PERSONAL HISTORY OF COLONIC POLYPS: ICD-10-CM

## 2023-03-13 DIAGNOSIS — Z86.79 PERSONAL HISTORY OF OTHER DISEASES OF THE CIRCULATORY SYSTEM: ICD-10-CM

## 2023-03-13 DIAGNOSIS — K21.9 GASTRO-ESOPHAGEAL REFLUX DISEASE WITHOUT ESOPHAGITIS: ICD-10-CM

## 2023-03-13 DIAGNOSIS — K44.9 DIAPHRAGMATIC HERNIA WITHOUT OBSTRUCTION OR GANGRENE: ICD-10-CM

## 2023-03-13 DIAGNOSIS — Z83.438 FAMILY HISTORY OF OTHER DISORDER OF LIPOPROTEIN METABOLISM AND OTHER LIPIDEMIA: ICD-10-CM

## 2023-03-13 DIAGNOSIS — L89.153 PRESSURE ULCER OF SACRAL REGION, STAGE 3: ICD-10-CM

## 2023-03-13 DIAGNOSIS — Z79.01 LONG TERM (CURRENT) USE OF ANTICOAGULANTS: ICD-10-CM

## 2023-03-13 DIAGNOSIS — Z82.0 FAMILY HISTORY OF EPILEPSY AND OTHER DISEASES OF THE NERVOUS SYSTEM: ICD-10-CM

## 2023-03-13 DIAGNOSIS — Z79.899 OTHER LONG TERM (CURRENT) DRUG THERAPY: ICD-10-CM

## 2023-03-13 DIAGNOSIS — N40.0 BENIGN PROSTATIC HYPERPLASIA WITHOUT LOWER URINARY TRACT SYMPTOMS: ICD-10-CM

## 2023-03-13 NOTE — REVIEW OF SYSTEMS
[Negative] : Psychiatric [FreeTextEntry2] : non ambulatory [FreeTextEntry5] : A Fib,hypertension, dyspnea on exertion,hyperlipidemia [FreeTextEntry7] : GERD, hiatal hernia, GI polyp ascending colon [FreeTextEntry8] : BPH [de-identified] : sacral pressure ulcer [de-identified] : intercerebral bleed [de-identified] : DM2 with neuropathy [de-identified] : polycyothemia

## 2023-03-13 NOTE — PLAN
[FreeTextEntry1] : off load sacral area\par turn frequently\par collagenase,CaAlginate,DD,QD to sacral ulcer necrotic tissue\par pack ulcer with alginate rope\par authorization for debridement\par return 2 weeks\par 25 minutes spent in review,evaluation and treatment planning\par

## 2023-03-13 NOTE — HISTORY OF PRESENT ILLNESS
[FreeTextEntry1] : Pt c/o sacral wound noted approximately middle of January 2023. Pt receives visiting nurse services, nurse recommended the wound be evaluated; pt spouse notified pt PCP, and was referred to [facility] for further evaluation. Pt spouse states pt has a hospital bed/air mattress, but is unable to state which type - pt spouse will bring in name of hospital air mattress at next visit. Pt spouse states pt has a "special cushion" for wheel chair, states "I don't think it's a ROHO cushion." Pt denies pain at present. VSN conducts turns and repositioning at home. \par 3/10/23 much less necrotic tissue with good granulation notes. No palpable bone exposed. No signs of infection. Small cephalad area of necrotic tissue remains. Will consider VAC once less necrotic tissue present

## 2023-03-13 NOTE — VITALS
[Pain related to present condition?] : The patient's  pain is related to present condition. [Aching] : aching [] : No [de-identified] : 4/10 [FreeTextEntry3] : sacral area [FreeTextEntry1] : rest, frequent turning and positioning [FreeTextEntry2] : movement

## 2023-03-13 NOTE — PHYSICAL EXAM
[4 x 4] : 4 x 4  [Alert] : alert [Oriented to Person] : oriented to person [Calm] : calm [de-identified] : WD WN 72 Y/O white male in NAD [de-identified] : sacral ulcer with periwound maceration and central necrotic tissue [FreeTextEntry1] : Sacrum [FreeTextEntry2] : 5.0 [FreeTextEntry4] : 1.4 [FreeTextEntry3] : 2.0 [de-identified] : Serosanguineous [de-identified] : 1.5-3.6 @ 9-12 O'clock [de-identified] : more then 20% [de-identified] : Collagenase, Calcium Alginate  [de-identified] : mechanically cleansed with 0.9 % normal saline, \par KARLO, Medipore tape [TWNoteComboBox1] : Midline [TWNoteComboBox4] : Moderate [TWNoteComboBox5] : Yes [TWNoteComboBox6] : Pressure [de-identified] : No [de-identified] : Normal [de-identified] : None [de-identified] : <20% [de-identified] : >75% [de-identified] : Yes [de-identified] : Daily [de-identified] : Primary Dressing

## 2023-03-13 NOTE — ASSESSMENT
[Verbal] : Verbal [Written] : Written [Demo] : Demo [Spouse] : Spouse [Caregiver] : Caregiver [Good - alert, interested, motivated] : Good - alert, interested, motivated [Dressing changes] : dressing changes [Skin Care] : skin care [Pressure relief] : pressure relief [Signs and symptoms of infection] : sign and symptoms of infection [Nutrition] : nutrition [How and When to Call] : how and when to call [Pain Management] : pain management [Home Health] : home health [Patient responsibility to plan of care] : patient responsibility to plan of care [] : Yes [Stable] : stable [Home] : Home [Wheelchair] : Wheelchair [Not Applicable - Long Term Care/Home Health Agency] : Long Term Care/Home Health Agency: Not Applicable [FreeTextEntry2] : Infection Prevention\par Maintain skin integrity\par Protect/ Guard wound site\par Off loading/ Low sodium diet \par Proper Diet and Nutriton for wound healing\par Use of pharmacological and nonpharmacological techniques for pain prevention\par Frequent turning and positioning every 2 hours\par  [FreeTextEntry4] : F/U in 2 weeks for assessment\par MD held debridement of necrotic to sacrum ulcer\par Pt spouse provided with copy of wound care instructions

## 2023-03-22 ENCOUNTER — NON-APPOINTMENT (OUTPATIENT)
Age: 73
End: 2023-03-22

## 2023-03-22 ENCOUNTER — APPOINTMENT (OUTPATIENT)
Dept: CARDIOLOGY | Facility: CLINIC | Age: 73
End: 2023-03-22
Payer: COMMERCIAL

## 2023-03-22 VITALS — DIASTOLIC BLOOD PRESSURE: 78 MMHG | SYSTOLIC BLOOD PRESSURE: 120 MMHG

## 2023-03-22 PROCEDURE — 93000 ELECTROCARDIOGRAM COMPLETE: CPT

## 2023-03-22 PROCEDURE — 99215 OFFICE O/P EST HI 40 MIN: CPT

## 2023-03-22 RX ORDER — METHYLPHENIDATE HYDROCHLORIDE 10 MG/1
10 TABLET ORAL
Qty: 90 | Refills: 0 | Status: DISCONTINUED | COMMUNITY
Start: 2022-05-02 | End: 2023-03-22

## 2023-03-22 NOTE — REASON FOR VISIT
[Arrhythmia/ECG Abnorrmalities] : arrhythmia/ECG abnormalities [CV Risk Factors and Non-Cardiac Disease] : CV risk factors and non-cardiac disease [Follow-Up - Clinic] : a clinic follow-up of [Anticoagulation] : anticoagulation [Atrial Fibrillation] : atrial fibrillation [Hyperlipidemia] : hyperlipidemia [Hypertension] : hypertension [Medication Management] : Medication management

## 2023-03-24 ENCOUNTER — OUTPATIENT (OUTPATIENT)
Dept: OUTPATIENT SERVICES | Facility: HOSPITAL | Age: 73
LOS: 1 days | Discharge: HOME | End: 2023-03-24
Payer: COMMERCIAL

## 2023-03-24 ENCOUNTER — APPOINTMENT (OUTPATIENT)
Dept: WOUND CARE | Facility: HOSPITAL | Age: 73
End: 2023-03-24
Payer: COMMERCIAL

## 2023-03-24 DIAGNOSIS — Z98.890 OTHER SPECIFIED POSTPROCEDURAL STATES: Chronic | ICD-10-CM

## 2023-03-24 DIAGNOSIS — L89.150 PRESSURE ULCER OF SACRAL REGION, UNSTAGEABLE: ICD-10-CM

## 2023-03-24 PROCEDURE — 97602 WOUND(S) CARE NON-SELECTIVE: CPT

## 2023-03-24 PROCEDURE — 99213 OFFICE O/P EST LOW 20 MIN: CPT

## 2023-03-26 DIAGNOSIS — Z86.718 PERSONAL HISTORY OF OTHER VENOUS THROMBOSIS AND EMBOLISM: ICD-10-CM

## 2023-03-26 DIAGNOSIS — Z83.3 FAMILY HISTORY OF DIABETES MELLITUS: ICD-10-CM

## 2023-03-26 DIAGNOSIS — D72.9 DISORDER OF WHITE BLOOD CELLS, UNSPECIFIED: ICD-10-CM

## 2023-03-26 DIAGNOSIS — E53.8 DEFICIENCY OF OTHER SPECIFIED B GROUP VITAMINS: ICD-10-CM

## 2023-03-26 DIAGNOSIS — Z79.899 OTHER LONG TERM (CURRENT) DRUG THERAPY: ICD-10-CM

## 2023-03-26 DIAGNOSIS — E78.00 PURE HYPERCHOLESTEROLEMIA, UNSPECIFIED: ICD-10-CM

## 2023-03-26 DIAGNOSIS — Z86.79 PERSONAL HISTORY OF OTHER DISEASES OF THE CIRCULATORY SYSTEM: ICD-10-CM

## 2023-03-26 DIAGNOSIS — Z83.438 FAMILY HISTORY OF OTHER DISORDER OF LIPOPROTEIN METABOLISM AND OTHER LIPIDEMIA: ICD-10-CM

## 2023-03-26 DIAGNOSIS — E55.9 VITAMIN D DEFICIENCY, UNSPECIFIED: ICD-10-CM

## 2023-03-26 DIAGNOSIS — Z85.820 PERSONAL HISTORY OF MALIGNANT MELANOMA OF SKIN: ICD-10-CM

## 2023-03-26 DIAGNOSIS — K44.9 DIAPHRAGMATIC HERNIA WITHOUT OBSTRUCTION OR GANGRENE: ICD-10-CM

## 2023-03-26 DIAGNOSIS — Z79.01 LONG TERM (CURRENT) USE OF ANTICOAGULANTS: ICD-10-CM

## 2023-03-26 DIAGNOSIS — Z98.890 OTHER SPECIFIED POSTPROCEDURAL STATES: ICD-10-CM

## 2023-03-26 DIAGNOSIS — Z82.0 FAMILY HISTORY OF EPILEPSY AND OTHER DISEASES OF THE NERVOUS SYSTEM: ICD-10-CM

## 2023-03-26 DIAGNOSIS — E11.40 TYPE 2 DIABETES MELLITUS WITH DIABETIC NEUROPATHY, UNSPECIFIED: ICD-10-CM

## 2023-03-26 DIAGNOSIS — I10 ESSENTIAL (PRIMARY) HYPERTENSION: ICD-10-CM

## 2023-03-26 DIAGNOSIS — Z80.8 FAMILY HISTORY OF MALIGNANT NEOPLASM OF OTHER ORGANS OR SYSTEMS: ICD-10-CM

## 2023-03-26 DIAGNOSIS — L89.153 PRESSURE ULCER OF SACRAL REGION, STAGE 3: ICD-10-CM

## 2023-03-26 DIAGNOSIS — G91.9 HYDROCEPHALUS, UNSPECIFIED: ICD-10-CM

## 2023-03-26 DIAGNOSIS — K21.9 GASTRO-ESOPHAGEAL REFLUX DISEASE WITHOUT ESOPHAGITIS: ICD-10-CM

## 2023-03-26 DIAGNOSIS — Z79.84 LONG TERM (CURRENT) USE OF ORAL HYPOGLYCEMIC DRUGS: ICD-10-CM

## 2023-03-26 DIAGNOSIS — Z86.010 PERSONAL HISTORY OF COLONIC POLYPS: ICD-10-CM

## 2023-03-26 DIAGNOSIS — N40.0 BENIGN PROSTATIC HYPERPLASIA WITHOUT LOWER URINARY TRACT SYMPTOMS: ICD-10-CM

## 2023-03-27 NOTE — END OF VISIT
[FreeTextEntry3] : I saw and evaluated the patient and discussed the care with the NP provider above on 03/22/2023 . I agree with the findings and plan as documented in the note above. I personally reviewed all of the hospital records available to me at this time, which included but are not limited to the discharge summary, labs and imaging reports. He has extensive DVT. now back on AC. advised to see vascular and repeat venous duplex. Will need to see neuro. [facility] as above.

## 2023-03-27 NOTE — HISTORY OF PRESENT ILLNESS
[FreeTextEntry1] : 73 year old man with a history of atrial fibrillation, SDH HTN, HLD, allergic conjunctivitis, neuropathy, SDH s/p mechanical fall, recurrent ICH when on AC. \par  \par Previously admitted to  with nausea and found to have CTH showed L IPH with parafalcine SDH. \par \par He was again admitted into [facility] from 1/7/23- 1/10/23 due to flu A.  Incidentally he was found to have an extensive DVT of the left leg.  He was placed back on Eliquis full dose of 5 mg twice a day.  Hospital course was complicated by atrial fibrillation with rapid ventricular response, Cardizem dosing was increased to 180 mg daily.\par He arrives today with his wife.  She denies any new concerning symptoms.  His orientation is at baseline, he is eating normally but primarily diary. He now is under care or wound management due to a sacral decub. \par \par \par His wife denies lower extremity edema, LOC.  No reported melena, hematochezia or hematemesis. \par

## 2023-03-27 NOTE — DISCUSSION/SUMMARY
[EKG obtained to assist in diagnosis and management of assessed problem(s)] : EKG obtained to assist in diagnosis and management of assessed problem(s) [FreeTextEntry1] : 73 year old man with HTN, HLD, DM and permanent atrial fibrillation who presents to the office for follow-up. \par \par Rex unfortunately has history of recurrent ICH. He was previously off of AC, however, due to the development of extensive DVTs, he is now back on full dose Eliquis. \par He is in no acute distress at today's visit.  ECG illustrates atrial fibrillation with rate of 114 bpm.  \par \par His blood pressure is controlled, but is HR is still elevated, I have advised we increase Cardizem to 240mg daily. To avoid a drop in B/P, he will decrease Hydralazine 25 mg q8 and maintain losartan 50mg q12.\par \par He will continue eliquis 5mg for thromboembolic prevention.  He unfortunately is not a good candidate for watchman procedure.  Because of his history of recurrent intra-cranial bleeds, I advised he follow up with neurology/neuro surgery to again assess and discuss risk/benefit of continued oral anticoagulation.\par \par He has been evaluated by vascular regarding DVT management, Would advised repeat LE doppler to evaluate for resolution.   \par \par He will continue his Crestor 10 and Vascepa for mixed hyperlipidemia, goal LDL <70.\par \par He will follow with me in 3 months.

## 2023-03-27 NOTE — REVIEW OF SYSTEMS
[Negative] : Heme/Lymph [FreeTextEntry9] : Wheelchair-bound [de-identified] : No new focal symptoms, wheelchair bound

## 2023-03-27 NOTE — PHYSICAL EXAM
[No Acute Distress] : no acute distress [Normal Venous Pressure] : normal venous pressure [Tachycardia] : tachycardic [Soft] : abdomen soft [Non Tender] : non-tender [No Edema] : no edema [Normal] : alert and oriented, normal memory [General Appearance - In No Acute Distress] : no acute distress [Normal Conjunctiva] : the conjunctiva exhibited no abnormalities [Eyelids - No Xanthelasma] : the eyelids demonstrated no xanthelasmas [Normal Oral Mucosa] : normal oral mucosa [No Oral Pallor] : no oral pallor [No Oral Cyanosis] : no oral cyanosis [Normal Jugular Venous V Waves Present] : normal jugular venous V waves present [Respiration, Rhythm And Depth] : normal respiratory rhythm and effort [Exaggerated Use Of Accessory Muscles For Inspiration] : no accessory muscle use [Auscultation Breath Sounds / Voice Sounds] : lungs were clear to auscultation bilaterally [Bowel Sounds] : normal bowel sounds [Abdomen Soft] : soft [Abdomen Tenderness] : non-tender [Nail Clubbing] : no clubbing of the fingernails [Petechial Hemorrhages (___cm)] : no petechial hemorrhages [Cyanosis, Localized] : no localized cyanosis [Skin Color & Pigmentation] : normal skin color and pigmentation [] : no rash [Oriented To Time, Place, And Person] : oriented to person, place, and time [Affect] : the affect was normal [Mood] : the mood was normal [No Anxiety] : not feeling anxious [Not Palpable] : not palpable [No Precordial Heave] : no precordial heave was noted [Normal Rate] : normal [Irregularly Irregular] : irregularly irregular [Normal S1] : normal S1 [Normal S2] : normal S2 [No Gallop] : no gallop heard [No Murmur] : no murmurs heard [2+] : left 2+ [No Abnormalities] : the abdominal aorta was not enlarged and no bruit was heard [No Pitting Edema] : no pitting edema present [de-identified] : Immobile [FreeTextEntry1] :  in wheelchair [Right Carotid Bruit] : no bruit heard over the right carotid [Left Carotid Bruit] : no bruit heard over the left carotid [Bruit] : no bruit heard

## 2023-03-27 NOTE — CARDIOLOGY SUMMARY
[de-identified] : poor baseline\par Atrial  fibrillation  \par LAFB low voltage. \par nonspecific T wave changes\par  [de-identified] : 7/02/20 nromal LV function mild LVH \par 6/2022 noraml LV function -EF 55 to 60%\par Mild concentric LVH

## 2023-04-03 NOTE — REVIEW OF SYSTEMS
[Negative] : Psychiatric [FreeTextEntry2] : non ambulatory [FreeTextEntry5] : A Fib,hypertension, dyspnea on exertion,hyperlipidemia [FreeTextEntry7] : GERD, hiatal hernia, GI polyp ascending colon [FreeTextEntry8] : BPH [de-identified] : sacral pressure ulcer [de-identified] : intercerebral bleed [de-identified] : DM2 with neuropathy [de-identified] : polycyothemia

## 2023-04-03 NOTE — ASSESSMENT
[Verbal] : Verbal [Written] : Written [Demo] : Demo [Patient] : Patient [Spouse] : Spouse [Caregiver] : Caregiver [Good - alert, interested, motivated] : Good - alert, interested, motivated [Verbalizes knowledge/Understanding] : Verbalizes knowledge/understanding [Dressing changes] : dressing changes [Pressure relief] : pressure relief [Signs and symptoms of infection] : sign and symptoms of infection [How and When to Call] : how and when to call [Off-loading] : off-loading [Patient responsibility to plan of care] : patient responsibility to plan of care [] : Yes [Stable] : stable [Home] : Home [Wheelchair] : Wheelchair [Faxed - Long Term Care/Home Health Agency] : Long Term Care/Home Health Agency: Faxed [FreeTextEntry2] : Infection Prevention\par Promote/Restore Skin Integrity\par Nutritious Diet to Promote Wound Healing\par Localized Wound Care\par Turn and Reposition\par Protect/Guard Wound Site\par Off-load and Pressure Relief\par F/U in 2 weeks [FreeTextEntry4] : Seen for \par Assessment\par Dressing Change\par \par Follow up in 2 weeks [FreeTextEntry1] : [facility]

## 2023-04-03 NOTE — VITALS
[Pain related to present condition?] : The patient's  pain is related to present condition. [Occasional] : occasional [] : No [de-identified] : 2/10 [FreeTextEntry3] : at the wound site [FreeTextEntry1] : elevation [FreeTextEntry2] : pressure [FreeTextEntry4] : pt prefers to not take anything for pain.

## 2023-04-03 NOTE — PHYSICAL EXAM
[4 x 4] : 4 x 4  [Abdominal Pad] : Abdominal Pad [Alert] : alert [Oriented to Person] : oriented to person [Calm] : calm [de-identified] : WD WN 74 Y/O white male in NAD [de-identified] : sacral ulcer with periwound maceration and central necrotic tissue [FreeTextEntry1] : Sacrum [FreeTextEntry2] : 3.8 [FreeTextEntry3] : 1.8 [FreeTextEntry4] : 0.7 [de-identified] : serosanguineous  [de-identified] : 6 to 8 o clock [de-identified] : macerated/erythema [de-identified] : >50% [de-identified] : Collagenase and Calcium Alginate [de-identified] : Mechanically cleansed with  0.9% normal saline and 4 x 4 sterile gauze. \par \par Medipore [TWNoteComboBox1] : Midline [TWNoteComboBox4] : Moderate [TWNoteComboBox5] : Yes [TWNoteComboBox6] : Pressure [de-identified] : No [de-identified] : other [de-identified] : None [de-identified] : <20% [de-identified] : <20% [de-identified] : Yes [de-identified] : Daily [de-identified] : Primary Dressing

## 2023-04-03 NOTE — HISTORY OF PRESENT ILLNESS
[FreeTextEntry1] : Pt c/o sacral wound noted approximately middle of January 2023. Pt receives visiting nurse services, nurse recommended the wound be evaluated; pt spouse notified pt PCP, and was referred to [facility] for further evaluation. Pt spouse states pt has a hospital bed/air mattress, but is unable to state which type - pt spouse will bring in name of hospital air mattress at next visit. Pt spouse states pt has a "special cushion" for wheel chair, states "I don't think it's a ROHO cushion." Pt denies pain at present. VSN conducts turns and repositioning at home. \par 3/10/23 much less necrotic tissue with good granulation notes. No palpable bone exposed. No signs of infection. Small cephalad area of necrotic tissue remains. Will consider VAC once less necrotic tissue present\par 3/24/23 sacral wound . Some very light straw colored with slight greenish tinge but no odor or obvious \par drainage, No erythema. Will order VAC

## 2023-04-03 NOTE — PLAN
[FreeTextEntry1] : off load sacral area\par turn frequently\par medihonet,CaAlginate,DD,3X/week\par pack ulcer with alginate rope\par Order VAC, black foam,3X/week, 125 mmHg negative pressure\par return 2 weeks\par 25 minutes spent in review,evaluation and treatment planning\par

## 2023-04-05 ENCOUNTER — APPOINTMENT (OUTPATIENT)
Dept: VASCULAR SURGERY | Facility: CLINIC | Age: 73
End: 2023-04-05
Payer: COMMERCIAL

## 2023-04-05 VITALS
HEIGHT: 67 IN | HEART RATE: 96 BPM | OXYGEN SATURATION: 97 % | BODY MASS INDEX: 19.62 KG/M2 | DIASTOLIC BLOOD PRESSURE: 86 MMHG | WEIGHT: 125 LBS | SYSTOLIC BLOOD PRESSURE: 124 MMHG

## 2023-04-05 PROCEDURE — 99213 OFFICE O/P EST LOW 20 MIN: CPT

## 2023-04-05 NOTE — HISTORY OF PRESENT ILLNESS
[FreeTextEntry1] : 71 yo M sent for eval of LLE DVT. Rex is non ambulatory secondary to a head bleed about a year ago. He used to be on AC for A Fib but was eventually held when ICH happened. He has been wheelchair bound ever since. He denies LE swelling. Last admission to [facility] was uneventful but during admission a LLE US revealed extensive DVT involving up to L EIV. He was started on Apixaban.

## 2023-04-07 ENCOUNTER — OUTPATIENT (OUTPATIENT)
Dept: OUTPATIENT SERVICES | Facility: HOSPITAL | Age: 73
LOS: 1 days | Discharge: HOME | End: 2023-04-07
Payer: COMMERCIAL

## 2023-04-07 ENCOUNTER — APPOINTMENT (OUTPATIENT)
Dept: WOUND CARE | Facility: HOSPITAL | Age: 73
End: 2023-04-07
Payer: COMMERCIAL

## 2023-04-07 VITALS
WEIGHT: 125 LBS | TEMPERATURE: 97.6 F | HEART RATE: 96 BPM | SYSTOLIC BLOOD PRESSURE: 142 MMHG | RESPIRATION RATE: 20 BRPM | OXYGEN SATURATION: 98 % | DIASTOLIC BLOOD PRESSURE: 83 MMHG | HEIGHT: 67 IN | BODY MASS INDEX: 19.62 KG/M2

## 2023-04-07 DIAGNOSIS — E11.40 TYPE 2 DIABETES MELLITUS WITH DIABETIC NEUROPATHY, UNSPECIFIED: ICD-10-CM

## 2023-04-07 DIAGNOSIS — Z83.3 FAMILY HISTORY OF DIABETES MELLITUS: ICD-10-CM

## 2023-04-07 DIAGNOSIS — Z98.890 OTHER SPECIFIED POSTPROCEDURAL STATES: Chronic | ICD-10-CM

## 2023-04-07 DIAGNOSIS — Z83.438 FAMILY HISTORY OF OTHER DISORDER OF LIPOPROTEIN METABOLISM AND OTHER LIPIDEMIA: ICD-10-CM

## 2023-04-07 DIAGNOSIS — K21.9 GASTRO-ESOPHAGEAL REFLUX DISEASE WITHOUT ESOPHAGITIS: ICD-10-CM

## 2023-04-07 DIAGNOSIS — E78.00 PURE HYPERCHOLESTEROLEMIA, UNSPECIFIED: ICD-10-CM

## 2023-04-07 DIAGNOSIS — Z82.0 FAMILY HISTORY OF EPILEPSY AND OTHER DISEASES OF THE NERVOUS SYSTEM: ICD-10-CM

## 2023-04-07 DIAGNOSIS — N40.0 BENIGN PROSTATIC HYPERPLASIA WITHOUT LOWER URINARY TRACT SYMPTOMS: ICD-10-CM

## 2023-04-07 DIAGNOSIS — Z86.79 PERSONAL HISTORY OF OTHER DISEASES OF THE CIRCULATORY SYSTEM: ICD-10-CM

## 2023-04-07 DIAGNOSIS — D72.9 DISORDER OF WHITE BLOOD CELLS, UNSPECIFIED: ICD-10-CM

## 2023-04-07 DIAGNOSIS — L89.153 PRESSURE ULCER OF SACRAL REGION, STAGE 3: ICD-10-CM

## 2023-04-07 DIAGNOSIS — E55.9 VITAMIN D DEFICIENCY, UNSPECIFIED: ICD-10-CM

## 2023-04-07 DIAGNOSIS — Z80.8 FAMILY HISTORY OF MALIGNANT NEOPLASM OF OTHER ORGANS OR SYSTEMS: ICD-10-CM

## 2023-04-07 DIAGNOSIS — L89.150 PRESSURE ULCER OF SACRAL REGION, UNSTAGEABLE: ICD-10-CM

## 2023-04-07 DIAGNOSIS — Z79.84 LONG TERM (CURRENT) USE OF ORAL HYPOGLYCEMIC DRUGS: ICD-10-CM

## 2023-04-07 DIAGNOSIS — Z79.01 LONG TERM (CURRENT) USE OF ANTICOAGULANTS: ICD-10-CM

## 2023-04-07 DIAGNOSIS — Z86.010 PERSONAL HISTORY OF COLONIC POLYPS: ICD-10-CM

## 2023-04-07 DIAGNOSIS — Z86.718 PERSONAL HISTORY OF OTHER VENOUS THROMBOSIS AND EMBOLISM: ICD-10-CM

## 2023-04-07 DIAGNOSIS — Z85.820 PERSONAL HISTORY OF MALIGNANT MELANOMA OF SKIN: ICD-10-CM

## 2023-04-07 DIAGNOSIS — Z79.899 OTHER LONG TERM (CURRENT) DRUG THERAPY: ICD-10-CM

## 2023-04-07 DIAGNOSIS — K44.9 DIAPHRAGMATIC HERNIA WITHOUT OBSTRUCTION OR GANGRENE: ICD-10-CM

## 2023-04-07 DIAGNOSIS — Z98.890 OTHER SPECIFIED POSTPROCEDURAL STATES: ICD-10-CM

## 2023-04-07 DIAGNOSIS — Z82.49 FAMILY HISTORY OF ISCHEMIC HEART DISEASE AND OTHER DISEASES OF THE CIRCULATORY SYSTEM: ICD-10-CM

## 2023-04-07 DIAGNOSIS — I10 ESSENTIAL (PRIMARY) HYPERTENSION: ICD-10-CM

## 2023-04-07 DIAGNOSIS — G91.9 HYDROCEPHALUS, UNSPECIFIED: ICD-10-CM

## 2023-04-07 DIAGNOSIS — E53.8 DEFICIENCY OF OTHER SPECIFIED B GROUP VITAMINS: ICD-10-CM

## 2023-04-07 PROCEDURE — 97605 NEG PRS WND THER DME<=50SQCM: CPT

## 2023-04-07 PROCEDURE — 99213 OFFICE O/P EST LOW 20 MIN: CPT

## 2023-04-07 NOTE — ASSESSMENT
[Verbal] : Verbal [Written] : Written [Demo] : Demo [Patient] : Patient [Family member] : Family member [Good - alert, interested, motivated] : Good - alert, interested, motivated [Dressing changes] : dressing changes [Skin Care] : skin care [Signs and symptoms of infection] : sign and symptoms of infection [Nutrition] : nutrition [How and When to Call] : how and when to call [Pain Management] : pain management [Patient responsibility to plan of care] : patient responsibility to plan of care [] : Yes [Stable] : stable [Home] : Home [Wheelchair] : Wheelchair [Faxed - Long Term Care/Home Health Agency] : Long Term Care/Home Health Agency: Faxed [FreeTextEntry2] : Negative pressure therapy \par Promote optimal nutrition \par Frequent turning and repositioning \par Promote optimal skin integrity   [FreeTextEntry4] : Follow up in 2 weeks  [FreeTextEntry1] : [facility]

## 2023-04-07 NOTE — REVIEW OF SYSTEMS
[Negative] : Psychiatric [FreeTextEntry2] : non ambulatory [FreeTextEntry5] : A Fib,hypertension, dyspnea on exertion,hyperlipidemia [FreeTextEntry7] : GERD, hiatal hernia, GI polyp ascending colon [FreeTextEntry8] : BPH [de-identified] : sacral pressure ulcer [de-identified] : intercerebral bleed [de-identified] : DM2 with neuropathy [de-identified] : polycyothemia

## 2023-04-07 NOTE — PHYSICAL EXAM
[Alert] : alert [Oriented to Person] : oriented to person [Calm] : calm [de-identified] : WD WN 74 Y/O white male in NAD [de-identified] : sacral ulcer with periwound maceration and central necrotic tissue [FreeTextEntry1] : Midline sacrum  [FreeTextEntry3] : 1.6 [FreeTextEntry2] : 3.7 [FreeTextEntry4] : 1 [de-identified] : serosanguineous [de-identified] : Excoriation mild  [de-identified] : 0.4cm @ 11 - 1 o'clock  [de-identified] : 15% [de-identified] : Woundvac continue negative pressure therapy @ 125mm/hg with 1 piece black foam [de-identified] : Mechanically Cleansed with Normal Saline and Sterile Gauze\par  [TWNoteComboBox4] : Moderate [de-identified] : Yes [de-identified] : other [de-identified] : None [de-identified] : None [de-identified] : >75% [de-identified] : Yes [de-identified] : 3x Weekly [de-identified] : Primary Dressing

## 2023-04-07 NOTE — PLAN
[FreeTextEntry1] : off load sacral area\par turn frequently\par wound is VAC ready and VAC placed today\par return 2 weeks\par 25 minutes spent in review,evaluation and treatment planning\par

## 2023-04-07 NOTE — HISTORY OF PRESENT ILLNESS
[FreeTextEntry1] : Pt c/o sacral wound noted approximately middle of January 2023. Pt receives visiting nurse services, nurse recommended the wound be evaluated; pt spouse notified pt PCP, and was referred to [facility] for further evaluation. Pt spouse states pt has a hospital bed/air mattress, but is unable to state which type - pt spouse will bring in name of hospital air mattress at next visit. Pt spouse states pt has a "special cushion" for wheel chair, states "I don't think it's a ROHO cushion." Pt denies pain at present. VSN conducts turns and repositioning at home. \par 3/10/23 much less necrotic tissue with good granulation notes. No palpable bone exposed. No signs of infection. Small cephalad area of necrotic tissue remains. Will consider VAC once less necrotic tissue present\par 3/24/23 sacral wound . Some very light straw colored with slight greenish tinge but no odor or obvious \par drainage, No erythema. Will order VAC\par 4/7/23 small amount of serous drainage noted.VAC applied. No signs of infection seen

## 2023-04-09 ENCOUNTER — EMERGENCY (EMERGENCY)
Facility: HOSPITAL | Age: 73
LOS: 1 days | Discharge: HOME | End: 2023-04-09
Attending: EMERGENCY MEDICINE | Admitting: EMERGENCY MEDICINE
Payer: MEDICARE

## 2023-04-09 VITALS
TEMPERATURE: 99 F | OXYGEN SATURATION: 97 % | HEART RATE: 115 BPM | DIASTOLIC BLOOD PRESSURE: 101 MMHG | WEIGHT: 154.98 LBS | HEIGHT: 71 IN | SYSTOLIC BLOOD PRESSURE: 151 MMHG

## 2023-04-09 VITALS
DIASTOLIC BLOOD PRESSURE: 86 MMHG | TEMPERATURE: 98 F | SYSTOLIC BLOOD PRESSURE: 138 MMHG | RESPIRATION RATE: 18 BRPM | HEART RATE: 102 BPM | OXYGEN SATURATION: 100 %

## 2023-04-09 DIAGNOSIS — Z98.890 OTHER SPECIFIED POSTPROCEDURAL STATES: Chronic | ICD-10-CM

## 2023-04-09 LAB
ALBUMIN SERPL ELPH-MCNC: 2.8 G/DL — LOW (ref 3.3–5)
ALP SERPL-CCNC: 74 U/L — SIGNIFICANT CHANGE UP (ref 40–120)
ALT FLD-CCNC: 19 U/L — SIGNIFICANT CHANGE UP (ref 12–78)
ANION GAP SERPL CALC-SCNC: 4 MMOL/L — LOW (ref 5–17)
APTT BLD: 35.9 SEC — HIGH (ref 27.5–35.5)
AST SERPL-CCNC: 12 U/L — LOW (ref 15–37)
BASOPHILS # BLD AUTO: 0.06 K/UL — SIGNIFICANT CHANGE UP (ref 0–0.2)
BASOPHILS NFR BLD AUTO: 0.4 % — SIGNIFICANT CHANGE UP (ref 0–2)
BILIRUB SERPL-MCNC: 0.4 MG/DL — SIGNIFICANT CHANGE UP (ref 0.2–1.2)
BUN SERPL-MCNC: 21 MG/DL — SIGNIFICANT CHANGE UP (ref 7–23)
CALCIUM SERPL-MCNC: 8.8 MG/DL — SIGNIFICANT CHANGE UP (ref 8.5–10.1)
CHLORIDE SERPL-SCNC: 110 MMOL/L — HIGH (ref 96–108)
CO2 SERPL-SCNC: 25 MMOL/L — SIGNIFICANT CHANGE UP (ref 22–31)
CREAT SERPL-MCNC: 0.74 MG/DL — SIGNIFICANT CHANGE UP (ref 0.5–1.3)
EGFR: 97 ML/MIN/1.73M2 — SIGNIFICANT CHANGE UP
EOSINOPHIL # BLD AUTO: 0.17 K/UL — SIGNIFICANT CHANGE UP (ref 0–0.5)
EOSINOPHIL NFR BLD AUTO: 1.2 % — SIGNIFICANT CHANGE UP (ref 0–6)
FLUAV AG NPH QL: SIGNIFICANT CHANGE UP
FLUBV AG NPH QL: SIGNIFICANT CHANGE UP
GLUCOSE SERPL-MCNC: 111 MG/DL — HIGH (ref 70–99)
HCT VFR BLD CALC: 36.6 % — LOW (ref 39–50)
HGB BLD-MCNC: 11.4 G/DL — LOW (ref 13–17)
IMM GRANULOCYTES NFR BLD AUTO: 0.9 % — SIGNIFICANT CHANGE UP (ref 0–0.9)
INR BLD: 1.71 RATIO — HIGH (ref 0.88–1.16)
LACTATE SERPL-SCNC: 1.4 MMOL/L — SIGNIFICANT CHANGE UP (ref 0.7–2)
LYMPHOCYTES # BLD AUTO: 1.38 K/UL — SIGNIFICANT CHANGE UP (ref 1–3.3)
LYMPHOCYTES # BLD AUTO: 9.4 % — LOW (ref 13–44)
MCHC RBC-ENTMCNC: 24.9 PG — LOW (ref 27–34)
MCHC RBC-ENTMCNC: 31.1 GM/DL — LOW (ref 32–36)
MCV RBC AUTO: 80.1 FL — SIGNIFICANT CHANGE UP (ref 80–100)
MONOCYTES # BLD AUTO: 0.71 K/UL — SIGNIFICANT CHANGE UP (ref 0–0.9)
MONOCYTES NFR BLD AUTO: 4.8 % — SIGNIFICANT CHANGE UP (ref 2–14)
NEUTROPHILS # BLD AUTO: 12.28 K/UL — HIGH (ref 1.8–7.4)
NEUTROPHILS NFR BLD AUTO: 83.3 % — HIGH (ref 43–77)
NRBC # BLD: 0 /100 WBCS — SIGNIFICANT CHANGE UP (ref 0–0)
NT-PROBNP SERPL-SCNC: 849 PG/ML — HIGH (ref 0–125)
PLATELET # BLD AUTO: 272 K/UL — SIGNIFICANT CHANGE UP (ref 150–400)
POTASSIUM SERPL-MCNC: 3.8 MMOL/L — SIGNIFICANT CHANGE UP (ref 3.5–5.3)
POTASSIUM SERPL-SCNC: 3.8 MMOL/L — SIGNIFICANT CHANGE UP (ref 3.5–5.3)
PROCALCITONIN SERPL-MCNC: 0.14 NG/ML — HIGH
PROT SERPL-MCNC: 6 G/DL — SIGNIFICANT CHANGE UP (ref 6–8.3)
PROTHROM AB SERPL-ACNC: 20.1 SEC — HIGH (ref 10.5–13.4)
RBC # BLD: 4.57 M/UL — SIGNIFICANT CHANGE UP (ref 4.2–5.8)
RBC # FLD: 17.4 % — HIGH (ref 10.3–14.5)
RSV RNA NPH QL NAA+NON-PROBE: SIGNIFICANT CHANGE UP
SARS-COV-2 RNA SPEC QL NAA+PROBE: SIGNIFICANT CHANGE UP
SODIUM SERPL-SCNC: 139 MMOL/L — SIGNIFICANT CHANGE UP (ref 135–145)
WBC # BLD: 14.73 K/UL — HIGH (ref 3.8–10.5)
WBC # FLD AUTO: 14.73 K/UL — HIGH (ref 3.8–10.5)

## 2023-04-09 PROCEDURE — 71045 X-RAY EXAM CHEST 1 VIEW: CPT | Mod: 26

## 2023-04-09 PROCEDURE — 84145 PROCALCITONIN (PCT): CPT

## 2023-04-09 PROCEDURE — 87637 SARSCOV2&INF A&B&RSV AMP PRB: CPT

## 2023-04-09 PROCEDURE — 93005 ELECTROCARDIOGRAM TRACING: CPT

## 2023-04-09 PROCEDURE — 85610 PROTHROMBIN TIME: CPT

## 2023-04-09 PROCEDURE — 96374 THER/PROPH/DIAG INJ IV PUSH: CPT

## 2023-04-09 PROCEDURE — 94640 AIRWAY INHALATION TREATMENT: CPT

## 2023-04-09 PROCEDURE — 71045 X-RAY EXAM CHEST 1 VIEW: CPT

## 2023-04-09 PROCEDURE — 83880 ASSAY OF NATRIURETIC PEPTIDE: CPT

## 2023-04-09 PROCEDURE — 36415 COLL VENOUS BLD VENIPUNCTURE: CPT

## 2023-04-09 PROCEDURE — 85730 THROMBOPLASTIN TIME PARTIAL: CPT

## 2023-04-09 PROCEDURE — 87040 BLOOD CULTURE FOR BACTERIA: CPT

## 2023-04-09 PROCEDURE — 80053 COMPREHEN METABOLIC PANEL: CPT

## 2023-04-09 PROCEDURE — 85025 COMPLETE CBC W/AUTO DIFF WBC: CPT

## 2023-04-09 PROCEDURE — 99285 EMERGENCY DEPT VISIT HI MDM: CPT | Mod: 25

## 2023-04-09 PROCEDURE — 99284 EMERGENCY DEPT VISIT MOD MDM: CPT

## 2023-04-09 PROCEDURE — 83605 ASSAY OF LACTIC ACID: CPT

## 2023-04-09 PROCEDURE — 93010 ELECTROCARDIOGRAM REPORT: CPT

## 2023-04-09 RX ORDER — LEVOFLOXACIN 5 MG/ML
1 INJECTION, SOLUTION INTRAVENOUS
Qty: 20 | Refills: 0
Start: 2023-04-09 | End: 2023-04-18

## 2023-04-09 RX ORDER — LEVOFLOXACIN 5 MG/ML
1 INJECTION, SOLUTION INTRAVENOUS
Qty: 10 | Refills: 0
Start: 2023-04-09 | End: 2023-04-18

## 2023-04-09 RX ORDER — ALBUTEROL 90 UG/1
2.5 AEROSOL, METERED ORAL ONCE
Refills: 0 | Status: COMPLETED | OUTPATIENT
Start: 2023-04-09 | End: 2023-04-09

## 2023-04-09 RX ORDER — SODIUM CHLORIDE 9 MG/ML
2200 INJECTION, SOLUTION INTRAVENOUS ONCE
Refills: 0 | Status: COMPLETED | OUTPATIENT
Start: 2023-04-09 | End: 2023-04-09

## 2023-04-09 RX ADMIN — ALBUTEROL 2.5 MILLIGRAM(S): 90 AEROSOL, METERED ORAL at 08:50

## 2023-04-09 RX ADMIN — SODIUM CHLORIDE 2200 MILLILITER(S): 9 INJECTION, SOLUTION INTRAVENOUS at 08:44

## 2023-04-09 NOTE — ED PROVIDER NOTE - CLINICAL SUMMARY MEDICAL DECISION MAKING FREE TEXT BOX
1 week of intermittent diarrhea followed by 3 to 4 days of cough with slight shortness of breath here now requiring complete evaluation labs imaging as well as IV fluids cultures and medications.

## 2023-04-09 NOTE — ED PROVIDER NOTE - PHYSICAL EXAMINATION
Gen: Frail, somewhat cachectic white male in NAD with intermittent cough  Head:  NC/AT  ENT:  PERRLA, EOMI, Mucous membranes moist  Neck: Supple/No Midline tenderness/No meningismus  CV: Irreg/Irreg w/o any murmurs/rubs or gallops  Pulm:  Coarse BS B/L  Abd:  Soft, flat, nontender, nondistended, +BS, no rebound/guarding  Back:  no CVAT  Skin: Dry  Msk: No deformity  Neuro: Increase tone RUE/RLE

## 2023-04-09 NOTE — ED PROVIDER NOTE - NSFOLLOWUPINSTRUCTIONS_ED_ALL_ED_FT
Rest.  Increase fluid intake.  Take Levaquin 500 mg a day for the next 10 days.  Follow-up with your primary care physician tomorrow for reevaluation.  May take Tylenol for fever or pain.  Return here if needed.

## 2023-04-09 NOTE — ED PROVIDER NOTE - OBJECTIVE STATEMENT
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  Per wife at bedside patient has been in his baseline state of health up until approximately 1 week ago when he began experiencing intermittent episodes of diarrhea.  Over the past 2 to 3 days she has noted a nonproductive cough slightly worsening over the past 24 hours and ultimately secondary to that decided to have patient evaluated in our emergency department.  There was no documented fevers chills.  No hemoptysis.  Weakness is more profound.

## 2023-04-09 NOTE — ED PROVIDER NOTE - PROGRESS NOTE DETAILS
Patient was reevaluated in the emergency department while wife at bedside.  Patient appears to be much more comfortable at the present time looks more awake looks more alert wife agrees and is very happy that patient stable to go home.

## 2023-04-09 NOTE — ED PROVIDER NOTE - CARE PROVIDER_API CALL
Sunil Mg)  Internal Medicine; Pulmonary Disease  [address] [place] [address]  Phone: [phone]  Fax: [phone]  Follow Up Time:

## 2023-04-09 NOTE — ED PROVIDER NOTE - DIFFERENTIAL DIAGNOSIS
Differential Diagnosis Differential diagnosis includes bronchitis/upper respiratory infection/aspiration pneumonia/community-acquired pneumonia/COVID/influenza

## 2023-04-09 NOTE — ED PROVIDER NOTE - PATIENT PORTAL LINK FT
You can access the FollowMyHealth Patient Portal offered by [facility] by registering at the following website: [email or web address]. By joining [employer]’s FollowMyHealth portal, you will also be able to view your health information using other applications (apps) compatible with our system.

## 2023-04-09 NOTE — ED PROVIDER NOTE - CONSIDERATION OF ADMISSION OBSERVATION
Consideration of Admission/Observation We will consider escalating care to include admission if patient fails to respond appropriately and well to the emergency department management.

## 2023-04-09 NOTE — ED ADULT NURSE NOTE - OBJECTIVE STATEMENT
74y/o male A&ox2, nonambulatory received in rm3a. pt sent in for diarrhea for few days, dry shallow cough. pmhx TBI nonambulatory at baseline, afib. pt arrives on 5L o2 nc satting 100% at this time. afib on cardiac monitor. denies cp, sob, dizziness, lightheadedness. respirations even and unlabored. stage 3 sacral ulcer noted with undermining at this time, no drainage bleeding noted, dry sterile dressing applied at this time, as per wife had wound vac on wound and was recently taken off. 22g iv placed in L hand, labs sent. covid sent. md at bedside for eval. bed in lowest position, side rails up, call bell in hand, safety maintained. awaiting further orders. will continue to monitor.

## 2023-04-09 NOTE — ED ADULT TRIAGE NOTE - CHIEF COMPLAINT QUOTE
c/o cough for 2 days with non bloody diarrhea- patient was 91% on room air, on 4 litres nasal cannula in traige

## 2023-04-10 ENCOUNTER — NON-APPOINTMENT (OUTPATIENT)
Age: 73
End: 2023-04-10

## 2023-04-18 NOTE — ED ADULT TRIAGE NOTE - TEMPERATURE IN FAHRENHEIT (DEGREES F)
Patient does not meet protocol. Needs TSH drawn.     MD to please advise medication refill  Thank you.   
Pharmacy requesting refill for patients Levothyroxine 50mcg tablets.   
97.7

## 2023-04-20 ENCOUNTER — NON-APPOINTMENT (OUTPATIENT)
Age: 73
End: 2023-04-20

## 2023-04-21 ENCOUNTER — OUTPATIENT (OUTPATIENT)
Dept: OUTPATIENT SERVICES | Facility: HOSPITAL | Age: 73
LOS: 1 days | Discharge: HOME | End: 2023-04-21
Payer: COMMERCIAL

## 2023-04-21 ENCOUNTER — APPOINTMENT (OUTPATIENT)
Dept: WOUND CARE | Facility: HOSPITAL | Age: 73
End: 2023-04-21
Payer: COMMERCIAL

## 2023-04-21 VITALS
DIASTOLIC BLOOD PRESSURE: 86 MMHG | WEIGHT: 125 LBS | RESPIRATION RATE: 18 BRPM | HEART RATE: 81 BPM | TEMPERATURE: 97.8 F | SYSTOLIC BLOOD PRESSURE: 130 MMHG | BODY MASS INDEX: 19.62 KG/M2 | HEIGHT: 67 IN | OXYGEN SATURATION: 94 %

## 2023-04-21 DIAGNOSIS — L89.150 PRESSURE ULCER OF SACRAL REGION, UNSTAGEABLE: ICD-10-CM

## 2023-04-21 DIAGNOSIS — Z98.890 OTHER SPECIFIED POSTPROCEDURAL STATES: Chronic | ICD-10-CM

## 2023-04-21 PROCEDURE — 99213 OFFICE O/P EST LOW 20 MIN: CPT

## 2023-04-21 PROCEDURE — 97605 NEG PRS WND THER DME<=50SQCM: CPT

## 2023-04-22 NOTE — PLAN
[FreeTextEntry1] : off load sacral area\par turn frequently\par wound is VAC tolerated well\par return 3 weeks\par 25 minutes spent in review,evaluation and treatment planning\par

## 2023-04-22 NOTE — REVIEW OF SYSTEMS
[Negative] : Psychiatric [FreeTextEntry2] : non ambulatory [FreeTextEntry5] : A Fib,hypertension, dyspnea on exertion,hyperlipidemia [FreeTextEntry7] : GERD, hiatal hernia, GI polyp ascending colon [FreeTextEntry8] : BPH [de-identified] : sacral pressure ulcer [de-identified] : intercerebral bleed [de-identified] : DM2 with neuropathy [de-identified] : polycyothemia

## 2023-04-22 NOTE — HISTORY OF PRESENT ILLNESS
[FreeTextEntry1] : Pt c/o sacral wound noted approximately middle of January 2023. Pt receives visiting nurse services, nurse recommended the wound be evaluated; pt spouse notified pt PCP, and was referred to [facility] for further evaluation. Pt spouse states pt has a hospital bed/air mattress, but is unable to state which type - pt spouse will bring in name of hospital air mattress at next visit. Pt spouse states pt has a "special cushion" for wheel chair, states "I don't think it's a ROHO cushion." Pt denies pain at present. VSN conducts turns and repositioning at home. \par 3/10/23 much less necrotic tissue with good granulation notes. No palpable bone exposed. No signs of infection. Small cephalad area of necrotic tissue remains. Will consider VAC once less necrotic tissue present\par 3/24/23 sacral wound . Some very light straw colored with slight greenish tinge but no odor or obvious \par drainage, No erythema. Will order VAC\par 4/7/23 small amount of serous drainage noted.VAC applied. No signs of infection seen\par 4/21/23 sacral wound clean and appears smaller. will continue VAC. return in 3 weeks instead of 2 at wife\par request

## 2023-04-22 NOTE — PHYSICAL EXAM
[Alert] : alert [Oriented to Person] : oriented to person [Calm] : calm [de-identified] : WD WN 74 Y/O white male in NAD [de-identified] : sacral ulcer with periwound maceration and central necrotic tissue [FreeTextEntry1] : Sacrum- measurements are positional [FreeTextEntry2] : 3.1 [FreeTextEntry3] : 1.6 [FreeTextEntry4] : 0.7 [de-identified] : Small Serosanguineous [de-identified] : % [de-identified] : Intact [de-identified] : 1-25% [de-identified] : Wound Vac Dressing using black foam/ Wound Vac Therapy @ 125mmHg continuous negative pressure [de-identified] : Mechanically cleansed with Sterile Gauze & Normal saline\par  [de-identified] : None [de-identified] : None [de-identified] : Yes

## 2023-04-22 NOTE — ASSESSMENT
[Verbal] : Verbal [Demo] : Demo [Patient] : Patient [Spouse] : Spouse [Good - alert, interested, motivated] : Good - alert, interested, motivated [Verbalizes knowledge/Understanding] : Verbalizes knowledge/understanding [Dressing changes] : dressing changes [Skin Care] : skin care [Pressure relief] : pressure relief [Signs and symptoms of infection] : sign and symptoms of infection [How and When to Call] : how and when to call [Off-loading] : off-loading [Patient responsibility to plan of care] : patient responsibility to plan of care [] : Yes [Stable] : stable [Home] : Home [Stretcher] : Stretcher [Faxed - Long Term Care/Home Health Agency] : Long Term Care/Home Health Agency: Faxed [FreeTextEntry2] : Alteration in skin integrity- promote optimal skin integrity\par  [FreeTextEntry4] : Dr Palafox/ Photo taken\par F/U to [facility] in 3 weeks as requested by Pt's Wife & approved by Dr Palafox [FreeTextEntry1] : FORD

## 2023-04-23 DIAGNOSIS — Z85.820 PERSONAL HISTORY OF MALIGNANT MELANOMA OF SKIN: ICD-10-CM

## 2023-04-23 DIAGNOSIS — G91.9 HYDROCEPHALUS, UNSPECIFIED: ICD-10-CM

## 2023-04-23 DIAGNOSIS — Z82.49 FAMILY HISTORY OF ISCHEMIC HEART DISEASE AND OTHER DISEASES OF THE CIRCULATORY SYSTEM: ICD-10-CM

## 2023-04-23 DIAGNOSIS — Z79.899 OTHER LONG TERM (CURRENT) DRUG THERAPY: ICD-10-CM

## 2023-04-23 DIAGNOSIS — N40.0 BENIGN PROSTATIC HYPERPLASIA WITHOUT LOWER URINARY TRACT SYMPTOMS: ICD-10-CM

## 2023-04-23 DIAGNOSIS — Z86.718 PERSONAL HISTORY OF OTHER VENOUS THROMBOSIS AND EMBOLISM: ICD-10-CM

## 2023-04-23 DIAGNOSIS — L89.153 PRESSURE ULCER OF SACRAL REGION, STAGE 3: ICD-10-CM

## 2023-04-23 DIAGNOSIS — K44.9 DIAPHRAGMATIC HERNIA WITHOUT OBSTRUCTION OR GANGRENE: ICD-10-CM

## 2023-04-23 DIAGNOSIS — Z86.79 PERSONAL HISTORY OF OTHER DISEASES OF THE CIRCULATORY SYSTEM: ICD-10-CM

## 2023-04-23 DIAGNOSIS — Z82.0 FAMILY HISTORY OF EPILEPSY AND OTHER DISEASES OF THE NERVOUS SYSTEM: ICD-10-CM

## 2023-04-23 DIAGNOSIS — Z79.01 LONG TERM (CURRENT) USE OF ANTICOAGULANTS: ICD-10-CM

## 2023-04-23 DIAGNOSIS — D72.9 DISORDER OF WHITE BLOOD CELLS, UNSPECIFIED: ICD-10-CM

## 2023-04-23 DIAGNOSIS — Z83.438 FAMILY HISTORY OF OTHER DISORDER OF LIPOPROTEIN METABOLISM AND OTHER LIPIDEMIA: ICD-10-CM

## 2023-04-23 DIAGNOSIS — Z80.8 FAMILY HISTORY OF MALIGNANT NEOPLASM OF OTHER ORGANS OR SYSTEMS: ICD-10-CM

## 2023-04-23 DIAGNOSIS — Z98.890 OTHER SPECIFIED POSTPROCEDURAL STATES: ICD-10-CM

## 2023-04-23 DIAGNOSIS — E11.40 TYPE 2 DIABETES MELLITUS WITH DIABETIC NEUROPATHY, UNSPECIFIED: ICD-10-CM

## 2023-04-23 DIAGNOSIS — I10 ESSENTIAL (PRIMARY) HYPERTENSION: ICD-10-CM

## 2023-04-23 DIAGNOSIS — Z83.3 FAMILY HISTORY OF DIABETES MELLITUS: ICD-10-CM

## 2023-04-23 DIAGNOSIS — E53.8 DEFICIENCY OF OTHER SPECIFIED B GROUP VITAMINS: ICD-10-CM

## 2023-04-23 DIAGNOSIS — K21.9 GASTRO-ESOPHAGEAL REFLUX DISEASE WITHOUT ESOPHAGITIS: ICD-10-CM

## 2023-04-23 DIAGNOSIS — Z79.84 LONG TERM (CURRENT) USE OF ORAL HYPOGLYCEMIC DRUGS: ICD-10-CM

## 2023-04-23 DIAGNOSIS — Z86.010 PERSONAL HISTORY OF COLONIC POLYPS: ICD-10-CM

## 2023-04-23 DIAGNOSIS — E55.9 VITAMIN D DEFICIENCY, UNSPECIFIED: ICD-10-CM

## 2023-04-23 DIAGNOSIS — E78.00 PURE HYPERCHOLESTEROLEMIA, UNSPECIFIED: ICD-10-CM

## 2023-05-03 NOTE — DIETITIAN INITIAL EVALUATION ADULT - OTHER INFO
Weight Hx Per:  - Source: electronic medical record, previous RD notes  - UBW: 145 pounds as per previous RD noted    Weight Hx Per [facility] HIE: 135 pounds (11/24/22), 142.8 pounds (7/4/22)    Current Admission Weights:  - Dosing weight: 135 pounds (61.2 kg) (11/30)  - Daily weight: none documented thus far    Weight Change:  - 7% weight loss noted in 5 months. clinically significant    **  Will continue to monitor weight trends as available/able.   IBW: 136 pounds   %IBW: 100% DISPLAY PLAN FREE TEXT

## 2023-05-03 NOTE — ED ADULT NURSE NOTE - NSIMPLEMENTINTERV_GEN_ALL_ED
Implemented All Fall with Harm Risk Interventions:  [place] to call system. Call bell, personal items and telephone within reach. Instruct patient to call for assistance. Room bathroom lighting operational. Non-slip footwear when patient is off stretcher. Physically safe environment: no spills, clutter or unnecessary equipment. Stretcher in lowest position, wheels locked, appropriate side rails in place. Provide visual cue, wrist band, yellow gown, etc. Monitor gait and stability. Monitor for mental status changes and reorient to person, place, and time. Review medications for side effects contributing to fall risk. Reinforce activity limits and safety measures with patient and family. Provide visual clues: red socks.
Good

## 2023-05-05 ENCOUNTER — NON-APPOINTMENT (OUTPATIENT)
Age: 73
End: 2023-05-05

## 2023-05-11 ENCOUNTER — APPOINTMENT (OUTPATIENT)
Dept: WOUND CARE | Facility: HOSPITAL | Age: 73
End: 2023-05-11
Payer: COMMERCIAL

## 2023-05-11 ENCOUNTER — OUTPATIENT (OUTPATIENT)
Dept: OUTPATIENT SERVICES | Facility: HOSPITAL | Age: 73
LOS: 1 days | Discharge: HOME | End: 2023-05-11
Payer: COMMERCIAL

## 2023-05-11 VITALS
WEIGHT: 125 LBS | BODY MASS INDEX: 19.62 KG/M2 | DIASTOLIC BLOOD PRESSURE: 81 MMHG | TEMPERATURE: 98 F | OXYGEN SATURATION: 96 % | HEIGHT: 67 IN | HEART RATE: 98 BPM | RESPIRATION RATE: 18 BRPM | SYSTOLIC BLOOD PRESSURE: 130 MMHG

## 2023-05-11 DIAGNOSIS — L89.150 PRESSURE ULCER OF SACRAL REGION, UNSTAGEABLE: ICD-10-CM

## 2023-05-11 DIAGNOSIS — Z98.890 OTHER SPECIFIED POSTPROCEDURAL STATES: Chronic | ICD-10-CM

## 2023-05-11 PROCEDURE — G0463: CPT

## 2023-05-11 PROCEDURE — 99213 OFFICE O/P EST LOW 20 MIN: CPT

## 2023-05-11 NOTE — REVIEW OF SYSTEMS
[FreeTextEntry1] : Mr. Jarrell in unable to participate in ROS secondary to his baseline state/ medical condition.

## 2023-05-11 NOTE — PHYSICAL EXAM
[4 x 4] : 4 x 4  [Abdominal Pad] : Abdominal Pad [FreeTextEntry1] : Sacrum [FreeTextEntry2] : 3.1 [FreeTextEntry3] : 0.5 [FreeTextEntry4] : 0.4 [de-identified] : serosanguineous\par  [de-identified] : none [de-identified] : 25% [de-identified] : 75% [de-identified] : Mechanically cleansed with 0.9% normal saline and sterile gauze\par  [de-identified] : Alginate Rope, Allevyn [TWNoteComboBox4] : Small [TWNoteComboBox5] : No [de-identified] : No [TWNoteComboBox6] : Pressure [de-identified] : Normal [de-identified] : None [de-identified] : 3x Weekly [de-identified] : Primary Dressing

## 2023-05-11 NOTE — HISTORY OF PRESENT ILLNESS
[FreeTextEntry1] : Patient well known to [facility].\par Returning per usual schedule for ongoing assessment and treatment of sacral wound.\par Prior Wound Vac now discontinued.\par However, VNS in place for three times weekly visits.\par Mr. Jarrell is non -verbal.\par However, his family is present and denies acute issues/ new concerns...

## 2023-05-11 NOTE — ASSESSMENT
[Verbal] : Verbal [Written] : Written [Demo] : Demo [Patient] : Patient [Spouse] : Spouse [Good - alert, interested, motivated] : Good - alert, interested, motivated [Verbalizes knowledge/Understanding] : Verbalizes knowledge/understanding [Dressing changes] : dressing changes [Skin Care] : skin care [Pressure relief] : pressure relief [Signs and symptoms of infection] : sign and symptoms of infection [Nutrition] : nutrition [How and When to Call] : how and when to call [Pain Management] : pain management [] : Yes [Stable] : stable [Home] : Home [Wheelchair] : Wheelchair [Not Applicable - Long Term Care/Home Health Agency] : Long Term Care/Home Health Agency: Not Applicable [FreeTextEntry2] : Infection Prevention\par Nutrition and wound healing\par Dressing changes (Per MD order)\par Maintain optimal skin integrity to high pressure areas\par \par  [FreeTextEntry4] : Follow up [facility]CC 3 weeks

## 2023-05-11 NOTE — PLAN
[FreeTextEntry1] : As above in notation.\par Ms. Jarrell personally seen and examined.\par No new events since last visitation to [facility].\par Vitals non-suggestive today.\par Wound clean and as described above.\par No new labs required and no imaging indicated.\par Clinically, slowly improving overall.\par Surgically, stable for present without tissue for debridement or collection to drain.\par To continue present local and supportive care with dressing change to Alginate/ Allevyn/ Barrier.\par RTWC in ~ 3 weeks.  Reviewed with family at bedside and WCN.\par Please note that more than 50% of time was spent in counseling and coordination of care.

## 2023-05-17 DIAGNOSIS — E55.9 VITAMIN D DEFICIENCY, UNSPECIFIED: ICD-10-CM

## 2023-05-17 DIAGNOSIS — Z82.49 FAMILY HISTORY OF ISCHEMIC HEART DISEASE AND OTHER DISEASES OF THE CIRCULATORY SYSTEM: ICD-10-CM

## 2023-05-17 DIAGNOSIS — E53.8 DEFICIENCY OF OTHER SPECIFIED B GROUP VITAMINS: ICD-10-CM

## 2023-05-17 DIAGNOSIS — Z83.438 FAMILY HISTORY OF OTHER DISORDER OF LIPOPROTEIN METABOLISM AND OTHER LIPIDEMIA: ICD-10-CM

## 2023-05-17 DIAGNOSIS — E11.40 TYPE 2 DIABETES MELLITUS WITH DIABETIC NEUROPATHY, UNSPECIFIED: ICD-10-CM

## 2023-05-17 DIAGNOSIS — D72.9 DISORDER OF WHITE BLOOD CELLS, UNSPECIFIED: ICD-10-CM

## 2023-05-17 DIAGNOSIS — K44.9 DIAPHRAGMATIC HERNIA WITHOUT OBSTRUCTION OR GANGRENE: ICD-10-CM

## 2023-05-17 DIAGNOSIS — Z79.84 LONG TERM (CURRENT) USE OF ORAL HYPOGLYCEMIC DRUGS: ICD-10-CM

## 2023-05-17 DIAGNOSIS — K21.9 GASTRO-ESOPHAGEAL REFLUX DISEASE WITHOUT ESOPHAGITIS: ICD-10-CM

## 2023-05-17 DIAGNOSIS — Z82.0 FAMILY HISTORY OF EPILEPSY AND OTHER DISEASES OF THE NERVOUS SYSTEM: ICD-10-CM

## 2023-05-17 DIAGNOSIS — Z80.8 FAMILY HISTORY OF MALIGNANT NEOPLASM OF OTHER ORGANS OR SYSTEMS: ICD-10-CM

## 2023-05-17 DIAGNOSIS — Z79.899 OTHER LONG TERM (CURRENT) DRUG THERAPY: ICD-10-CM

## 2023-05-17 DIAGNOSIS — Z83.3 FAMILY HISTORY OF DIABETES MELLITUS: ICD-10-CM

## 2023-05-17 DIAGNOSIS — Z85.820 PERSONAL HISTORY OF MALIGNANT MELANOMA OF SKIN: ICD-10-CM

## 2023-05-17 DIAGNOSIS — Z98.890 OTHER SPECIFIED POSTPROCEDURAL STATES: ICD-10-CM

## 2023-05-17 DIAGNOSIS — E78.00 PURE HYPERCHOLESTEROLEMIA, UNSPECIFIED: ICD-10-CM

## 2023-05-17 DIAGNOSIS — Z86.79 PERSONAL HISTORY OF OTHER DISEASES OF THE CIRCULATORY SYSTEM: ICD-10-CM

## 2023-05-17 DIAGNOSIS — I10 ESSENTIAL (PRIMARY) HYPERTENSION: ICD-10-CM

## 2023-05-17 DIAGNOSIS — G91.9 HYDROCEPHALUS, UNSPECIFIED: ICD-10-CM

## 2023-05-17 DIAGNOSIS — Z86.718 PERSONAL HISTORY OF OTHER VENOUS THROMBOSIS AND EMBOLISM: ICD-10-CM

## 2023-05-17 DIAGNOSIS — Z86.010 PERSONAL HISTORY OF COLONIC POLYPS: ICD-10-CM

## 2023-05-17 DIAGNOSIS — L89.150 PRESSURE ULCER OF SACRAL REGION, UNSTAGEABLE: ICD-10-CM

## 2023-05-17 DIAGNOSIS — N40.0 BENIGN PROSTATIC HYPERPLASIA WITHOUT LOWER URINARY TRACT SYMPTOMS: ICD-10-CM

## 2023-05-17 DIAGNOSIS — Z79.01 LONG TERM (CURRENT) USE OF ANTICOAGULANTS: ICD-10-CM

## 2023-06-01 ENCOUNTER — NON-APPOINTMENT (OUTPATIENT)
Age: 73
End: 2023-06-01

## 2023-06-02 ENCOUNTER — APPOINTMENT (OUTPATIENT)
Dept: WOUND CARE | Facility: HOSPITAL | Age: 73
End: 2023-06-02
Payer: COMMERCIAL

## 2023-06-02 ENCOUNTER — OUTPATIENT (OUTPATIENT)
Dept: OUTPATIENT SERVICES | Facility: HOSPITAL | Age: 73
LOS: 1 days | Discharge: HOME | End: 2023-06-02
Payer: COMMERCIAL

## 2023-06-02 VITALS
WEIGHT: 125 LBS | BODY MASS INDEX: 19.62 KG/M2 | RESPIRATION RATE: 16 BRPM | TEMPERATURE: 98.1 F | DIASTOLIC BLOOD PRESSURE: 78 MMHG | OXYGEN SATURATION: 97 % | HEIGHT: 67 IN | SYSTOLIC BLOOD PRESSURE: 132 MMHG | HEART RATE: 81 BPM

## 2023-06-02 DIAGNOSIS — L89.150 PRESSURE ULCER OF SACRAL REGION, UNSTAGEABLE: ICD-10-CM

## 2023-06-02 DIAGNOSIS — L97.822 NON-PRESSURE CHRONIC ULCER OF OTHER PART OF LEFT LOWER LEG WITH FAT LAYER EXPOSED: ICD-10-CM

## 2023-06-02 DIAGNOSIS — Z98.890 OTHER SPECIFIED POSTPROCEDURAL STATES: Chronic | ICD-10-CM

## 2023-06-02 PROCEDURE — 99213 OFFICE O/P EST LOW 20 MIN: CPT

## 2023-06-02 PROCEDURE — G0463: CPT

## 2023-06-02 NOTE — REASON FOR VISIT
[Follow-Up: _____] : a [unfilled] follow-up visit [Spouse] : spouse [Formal Caregiver] : formal caregiver [Other: _____] : [unfilled]

## 2023-06-04 ENCOUNTER — RX RENEWAL (OUTPATIENT)
Age: 73
End: 2023-06-04

## 2023-06-05 DIAGNOSIS — Z83.3 FAMILY HISTORY OF DIABETES MELLITUS: ICD-10-CM

## 2023-06-05 DIAGNOSIS — Z85.820 PERSONAL HISTORY OF MALIGNANT MELANOMA OF SKIN: ICD-10-CM

## 2023-06-05 DIAGNOSIS — E53.8 DEFICIENCY OF OTHER SPECIFIED B GROUP VITAMINS: ICD-10-CM

## 2023-06-05 DIAGNOSIS — K44.9 DIAPHRAGMATIC HERNIA WITHOUT OBSTRUCTION OR GANGRENE: ICD-10-CM

## 2023-06-05 DIAGNOSIS — L89.153 PRESSURE ULCER OF SACRAL REGION, STAGE 3: ICD-10-CM

## 2023-06-05 DIAGNOSIS — Z86.79 PERSONAL HISTORY OF OTHER DISEASES OF THE CIRCULATORY SYSTEM: ICD-10-CM

## 2023-06-05 DIAGNOSIS — E55.9 VITAMIN D DEFICIENCY, UNSPECIFIED: ICD-10-CM

## 2023-06-05 DIAGNOSIS — L97.812 NON-PRESSURE CHRONIC ULCER OF OTHER PART OF RIGHT LOWER LEG WITH FAT LAYER EXPOSED: ICD-10-CM

## 2023-06-05 DIAGNOSIS — Z79.899 OTHER LONG TERM (CURRENT) DRUG THERAPY: ICD-10-CM

## 2023-06-05 DIAGNOSIS — E78.00 PURE HYPERCHOLESTEROLEMIA, UNSPECIFIED: ICD-10-CM

## 2023-06-05 DIAGNOSIS — N40.0 BENIGN PROSTATIC HYPERPLASIA WITHOUT LOWER URINARY TRACT SYMPTOMS: ICD-10-CM

## 2023-06-05 DIAGNOSIS — Z82.49 FAMILY HISTORY OF ISCHEMIC HEART DISEASE AND OTHER DISEASES OF THE CIRCULATORY SYSTEM: ICD-10-CM

## 2023-06-05 DIAGNOSIS — G91.9 HYDROCEPHALUS, UNSPECIFIED: ICD-10-CM

## 2023-06-05 DIAGNOSIS — Z79.01 LONG TERM (CURRENT) USE OF ANTICOAGULANTS: ICD-10-CM

## 2023-06-05 DIAGNOSIS — Z79.84 LONG TERM (CURRENT) USE OF ORAL HYPOGLYCEMIC DRUGS: ICD-10-CM

## 2023-06-05 DIAGNOSIS — K21.9 GASTRO-ESOPHAGEAL REFLUX DISEASE WITHOUT ESOPHAGITIS: ICD-10-CM

## 2023-06-05 DIAGNOSIS — Z80.8 FAMILY HISTORY OF MALIGNANT NEOPLASM OF OTHER ORGANS OR SYSTEMS: ICD-10-CM

## 2023-06-05 DIAGNOSIS — I10 ESSENTIAL (PRIMARY) HYPERTENSION: ICD-10-CM

## 2023-06-05 DIAGNOSIS — D72.9 DISORDER OF WHITE BLOOD CELLS, UNSPECIFIED: ICD-10-CM

## 2023-06-05 DIAGNOSIS — Z83.438 FAMILY HISTORY OF OTHER DISORDER OF LIPOPROTEIN METABOLISM AND OTHER LIPIDEMIA: ICD-10-CM

## 2023-06-05 DIAGNOSIS — E11.40 TYPE 2 DIABETES MELLITUS WITH DIABETIC NEUROPATHY, UNSPECIFIED: ICD-10-CM

## 2023-06-05 DIAGNOSIS — Z82.0 FAMILY HISTORY OF EPILEPSY AND OTHER DISEASES OF THE NERVOUS SYSTEM: ICD-10-CM

## 2023-06-05 DIAGNOSIS — Z98.890 OTHER SPECIFIED POSTPROCEDURAL STATES: ICD-10-CM

## 2023-06-05 DIAGNOSIS — Z86.010 PERSONAL HISTORY OF COLONIC POLYPS: ICD-10-CM

## 2023-06-05 DIAGNOSIS — Z86.718 PERSONAL HISTORY OF OTHER VENOUS THROMBOSIS AND EMBOLISM: ICD-10-CM

## 2023-06-10 ENCOUNTER — EMERGENCY (EMERGENCY)
Facility: HOSPITAL | Age: 73
LOS: 1 days | Discharge: HOME | End: 2023-06-10
Attending: EMERGENCY MEDICINE | Admitting: EMERGENCY MEDICINE
Payer: COMMERCIAL

## 2023-06-10 VITALS
DIASTOLIC BLOOD PRESSURE: 85 MMHG | WEIGHT: 145.06 LBS | RESPIRATION RATE: 16 BRPM | OXYGEN SATURATION: 96 % | SYSTOLIC BLOOD PRESSURE: 126 MMHG | TEMPERATURE: 99 F | HEART RATE: 95 BPM | HEIGHT: 68 IN

## 2023-06-10 DIAGNOSIS — Z98.890 OTHER SPECIFIED POSTPROCEDURAL STATES: Chronic | ICD-10-CM

## 2023-06-10 LAB
ALBUMIN SERPL ELPH-MCNC: 2.9 G/DL — LOW (ref 3.3–5)
ALP SERPL-CCNC: 80 U/L — SIGNIFICANT CHANGE UP (ref 40–120)
ALT FLD-CCNC: 17 U/L — SIGNIFICANT CHANGE UP (ref 12–78)
ANION GAP SERPL CALC-SCNC: 5 MMOL/L — SIGNIFICANT CHANGE UP (ref 5–17)
APTT BLD: 36.6 SEC — HIGH (ref 27.5–35.5)
AST SERPL-CCNC: 10 U/L — LOW (ref 15–37)
BASOPHILS # BLD AUTO: 0.05 K/UL — SIGNIFICANT CHANGE UP (ref 0–0.2)
BASOPHILS NFR BLD AUTO: 0.2 % — SIGNIFICANT CHANGE UP (ref 0–2)
BILIRUB SERPL-MCNC: 0.3 MG/DL — SIGNIFICANT CHANGE UP (ref 0.2–1.2)
BUN SERPL-MCNC: 23 MG/DL — SIGNIFICANT CHANGE UP (ref 7–23)
CALCIUM SERPL-MCNC: 8.5 MG/DL — SIGNIFICANT CHANGE UP (ref 8.5–10.1)
CHLORIDE SERPL-SCNC: 109 MMOL/L — HIGH (ref 96–108)
CK SERPL-CCNC: 56 U/L — SIGNIFICANT CHANGE UP (ref 26–308)
CO2 SERPL-SCNC: 27 MMOL/L — SIGNIFICANT CHANGE UP (ref 22–31)
CREAT SERPL-MCNC: 0.76 MG/DL — SIGNIFICANT CHANGE UP (ref 0.5–1.3)
EGFR: 96 ML/MIN/1.73M2 — SIGNIFICANT CHANGE UP
EOSINOPHIL # BLD AUTO: 0.03 K/UL — SIGNIFICANT CHANGE UP (ref 0–0.5)
EOSINOPHIL NFR BLD AUTO: 0.1 % — SIGNIFICANT CHANGE UP (ref 0–6)
GLUCOSE SERPL-MCNC: 137 MG/DL — HIGH (ref 70–99)
HCT VFR BLD CALC: 35.7 % — LOW (ref 39–50)
HGB BLD-MCNC: 11.3 G/DL — LOW (ref 13–17)
IMM GRANULOCYTES NFR BLD AUTO: 0.8 % — SIGNIFICANT CHANGE UP (ref 0–0.9)
INR BLD: 1.45 RATIO — HIGH (ref 0.88–1.16)
LACTATE SERPL-SCNC: 3.3 MMOL/L — HIGH (ref 0.7–2)
LYMPHOCYTES # BLD AUTO: 0.98 K/UL — LOW (ref 1–3.3)
LYMPHOCYTES # BLD AUTO: 4.7 % — LOW (ref 13–44)
MCHC RBC-ENTMCNC: 24.8 PG — LOW (ref 27–34)
MCHC RBC-ENTMCNC: 31.7 GM/DL — LOW (ref 32–36)
MCV RBC AUTO: 78.3 FL — LOW (ref 80–100)
MONOCYTES # BLD AUTO: 1.1 K/UL — HIGH (ref 0–0.9)
MONOCYTES NFR BLD AUTO: 5.2 % — SIGNIFICANT CHANGE UP (ref 2–14)
NEUTROPHILS # BLD AUTO: 18.65 K/UL — HIGH (ref 1.8–7.4)
NEUTROPHILS NFR BLD AUTO: 89 % — HIGH (ref 43–77)
NRBC # BLD: 0 /100 WBCS — SIGNIFICANT CHANGE UP (ref 0–0)
NT-PROBNP SERPL-SCNC: 1124 PG/ML — HIGH (ref 0–125)
PLATELET # BLD AUTO: 255 K/UL — SIGNIFICANT CHANGE UP (ref 150–400)
POTASSIUM SERPL-MCNC: 4.1 MMOL/L — SIGNIFICANT CHANGE UP (ref 3.5–5.3)
POTASSIUM SERPL-SCNC: 4.1 MMOL/L — SIGNIFICANT CHANGE UP (ref 3.5–5.3)
PROT SERPL-MCNC: 5.8 G/DL — LOW (ref 6–8.3)
PROTHROM AB SERPL-ACNC: 17 SEC — HIGH (ref 10.5–13.4)
RBC # BLD: 4.56 M/UL — SIGNIFICANT CHANGE UP (ref 4.2–5.8)
RBC # FLD: 17.1 % — HIGH (ref 10.3–14.5)
SODIUM SERPL-SCNC: 141 MMOL/L — SIGNIFICANT CHANGE UP (ref 135–145)
TROPONIN I, HIGH SENSITIVITY RESULT: 6.9 NG/L — SIGNIFICANT CHANGE UP
WBC # BLD: 20.97 K/UL — HIGH (ref 3.8–10.5)
WBC # FLD AUTO: 20.97 K/UL — HIGH (ref 3.8–10.5)

## 2023-06-10 PROCEDURE — 93010 ELECTROCARDIOGRAM REPORT: CPT

## 2023-06-10 PROCEDURE — 71045 X-RAY EXAM CHEST 1 VIEW: CPT | Mod: 26

## 2023-06-10 PROCEDURE — 70450 CT HEAD/BRAIN W/O DYE: CPT | Mod: 26,MA

## 2023-06-10 PROCEDURE — 99285 EMERGENCY DEPT VISIT HI MDM: CPT

## 2023-06-10 RX ORDER — SODIUM CHLORIDE 9 MG/ML
1000 INJECTION INTRAMUSCULAR; INTRAVENOUS; SUBCUTANEOUS
Refills: 0 | Status: DISCONTINUED | OUTPATIENT
Start: 2023-06-10 | End: 2023-06-14

## 2023-06-10 RX ORDER — VANCOMYCIN HCL 1 G
1000 VIAL (EA) INTRAVENOUS ONCE
Refills: 0 | Status: COMPLETED | OUTPATIENT
Start: 2023-06-10 | End: 2023-06-10

## 2023-06-10 RX ORDER — CEFEPIME 1 G/1
1000 INJECTION, POWDER, FOR SOLUTION INTRAMUSCULAR; INTRAVENOUS ONCE
Refills: 0 | Status: COMPLETED | OUTPATIENT
Start: 2023-06-10 | End: 2023-06-10

## 2023-06-10 RX ADMIN — SODIUM CHLORIDE 1000 MILLILITER(S): 9 INJECTION INTRAMUSCULAR; INTRAVENOUS; SUBCUTANEOUS at 21:06

## 2023-06-10 NOTE — ED PROVIDER NOTE - PHYSICAL EXAMINATION
EMT/paramedic Right arm contracture, left arm minimal movement at baseline, minimal movement bilateral legs    Sacral wounds as per rn note

## 2023-06-10 NOTE — ED PROVIDER NOTE - TEMPLATE, MLM
Overweight Overweight Overweight Overweight Overweight Communicable/Infectious Overweight Overweight Overweight

## 2023-06-10 NOTE — ED PROVIDER NOTE - NORMAL, MLM
[facility]  Cardiology Consultation        Hector Chris, male  : 1962  PCP: Narciso Cárdenas MD  Attending/Consulting Provider: Kishore Iraheta MD     Consults    Reason for Consultation:  Chief Complaint   Patient presents with   • Chest Pain Adult   • Breathing Problem         SUBJECTIVE:     History of Present Illness:  Hector Chris is a 58 year oldmale with PMHx of HTN, HLD, severe MR, who presented to the hospital due to worsening shortness of breath as well as chest pain. He shares that for the past 4-6 weeks, has had increasing dyspnea on exertion, described as increasing shortness of breath with walking >2 blocks, as well as having orthopnea and PND at occasions. He shares that for the past 2 months, has been having ocassional pressure like retrosternal chest tightness, that worsens when he is supine. He denies having angina on exertion. Was recently able to run 5 kilometers this past .     PMHx:  As stated above.   Known to have heart murmur since was 19yo, never told has Severe MR,   Has not undergone any evaluation.      PMHx:  Past Medical History:   Diagnosis Date   • Essential (primary) hypertension    • High cholesterol        PSHx:  History reviewed. No pertinent surgical history.    Family Hx:  Dad  at 55yo from [place].       Social Hx:  Social History     Tobacco Use   • Smoking status: Never Smoker   • Smokeless tobacco: Never Used   Substance Use Topics   • Alcohol use: Yes     Alcohol/week: 2.0 standard drinks     Types: 2 Glasses of wine per week     Frequency: Never       Allergies:  ALLERGIES:   Allergen Reactions   • Sulfa Antibiotics HIVES     Pt reported \"head-to-toe hives\", no throat swelling or trouble breathing       Medications:  Prior to Admission medications    Medication Sig Start Date End Date Taking? Authorizing Provider   lisinopril (ZESTRIL) 10 MG tablet Take 10 mg by mouth daily.   Yes Outside Provider   rosuvastatin  (CRESTOR) 20 MG tablet Take 20 mg by mouth daily.   Yes Outside Provider   aspirin (ECOTRIN) 81 MG EC tablet Take 81 mg by mouth daily.   Yes Outside Provider   Ascorbic Acid (VITAMIN C PO) Take 1 Dose by mouth daily.   Yes Outside Provider   Cyanocobalamin (VITAMIN B-12 PO) Take 1 Dose by mouth daily.   Yes Outside Provider   loratadine (CLARITIN) 10 MG tablet Take 10 mg by mouth daily.   Yes Outside Provider       Current Facility-Administered Medications   Medication Dose Route Frequency Provider Last Rate Last Admin   • [MAR Hold] furosemide (LASIX) tablet 20 mg  20 mg Oral BID Rasta Pack MD       • [MAR Hold] sodium chloride 0.9 % flush bag 25 mL  25 mL Intravenous PRN John Jimenez MD       • [MAR Hold] sodium chloride (PF) 0.9 % injection 2 mL  2 mL Intracatheter 2 times per day John Jimenez MD   2 mL at 03/01/21 2124   • [MAR Hold] sodium chloride (NORMAL SALINE) 0.9 % bolus 500 mL  500 mL Intravenous PRN John Jimenez MD       • [MAR Hold] enoxaparin (LOVENOX) injection 40 mg  40 mg Subcutaneous Daily John Jimenez MD       • [MAR Hold] aspirin (ECOTRIN) enteric coated tablet 81 mg  81 mg Oral Daily John Jimenez MD   81 mg at 03/02/21 0801   • [MAR Hold] loratadine (CLARITIN) tablet 10 mg  10 mg Oral Daily John Jimenez MD   10 mg at 03/02/21 0801   • [MAR Hold] atorvastatin (LIPITOR) tablet 80 mg  80 mg Oral Nightly John Jimenez MD   80 mg at 03/02/21 0801   • [MAR Hold] lisinopril (ZESTRIL) tablet 40 mg  40 mg Oral Daily Sharan Cavanaugh MD   40 mg at 03/02/21 0801   • [MAR Hold] amLODIPine (NORVASC) tablet 10 mg  10 mg Oral Daily Sharan Cavanaugh MD   10 mg at 03/02/21 0801   • [MAR Hold] acetaminophen (TYLENOL) tablet 650 mg  650 mg Oral Q6H PRN Komal Lee MD   650 mg at 03/02/21 1115       Review of Systems:  ROS   A complete 14 point ROS was done and negative except as stated above        OBJECTIVE:     Vital Last Value 24 Hour Range   Temperature 97.9 °F (36.6 °C)  (03/02/21 1045) Temp  Min: 97.7 °F (36.5 °C)  Max: 98.1 °F (36.7 °C)   Pulse 75 (03/02/21 1400) Pulse  Min: 58  Max: 78   Respiratory 20 (03/02/21 1400) Resp  Min: 16  Max: 22   Non-Invasive  Blood Pressure (!) 151/91 (03/02/21 1400) BP  Min: 110/65  Max: 166/109   Pulse Oximetry 96 % (03/02/21 1400) SpO2  Min: 95 %  Max: 100 %   Arterial   Blood Pressure   No data recorded      No intake or output data in the 24 hours ending 03/02/21 1516     Wt Readings from Last 3 Encounters:   03/01/21 86.9 kg (191 lb 9.3 oz)             Tele: [place], frequent PVCs.     PHYSICAL EXAMINATION:  Physical Exam   Const: Male in NAD, following commands.   HEENT: Normocephalic, atraumatic, perrla, eomi.   Neck: mild JVD, supple, mobile.   Heart: s1.s2 present, irregular, 4/6 holosystolic murmur best heard at 5th ICE mid clavicular line, which radiates to anterior axilla.   Abdomen: Soft, non tender, BS present.   LE: No LE edema, warm, pulses 2+ TP/DP.   Neuro: Grossly intact.     DIAGNOSTIC STUDIES:     Labs:  CBC:   Recent Labs   Lab 03/02/21  0608 03/01/21  1429   WBC 7.5 7.6   RBC 4.84 5.14   HGB 13.5 14.6   HCT 42.3 44.0   MCV 87.4 85.6   MCHC 31.9* 33.2   RDW-CV 13.4 13.2    268   Lymphocytes, Percent 40 45     CMP:  Recent Labs   Lab 03/02/21  0608 03/01/21  1429   SODIUM 140 140   POTASSIUM 4.4 4.2   CHLORIDE 105 103   CO2 28 29   BUN 15 13   CREATININE 1.11 0.99   GLUCOSE 97 104*   MG 2.0 2.0     COAGULATION STUDIES: No results found  TSH:  No results found for: TSH  HbA1c: No results found for: HGBA1C  LIPID PANEL: No results found  NT-PRONBP:   Recent Labs   Lab 03/01/21  1429   NT-proBNP 231*     Troponin:   Recent Labs   Lab 03/01/21  1834 03/01/21  1429   RAPDTR 0.02 0.02       Data:  Encounter Date: 03/01/21   Electrocardiogram 12-Lead   Result Value    Ventricular Rate EKG/Min (BPM) 70    Atrial Rate (BPM) 70    AL-Interval (MSEC) 160    QRS-Interval (MSEC) 96    QT-Interval (MSEC) 398    QTc 429    P Axis  (Degrees) 109    R Axis (Degrees) 39    T Axis (Degrees) 68    REPORT TEXT      Sinus rhythm  with occasional  premature ventricular complexes  Otherwise normal ECG  No previous ECGs available       2D TTE 11/16/2020;   1. Left ventricle: The cavity size is normal. Wall thickness is mildly     increased. There is concentric hypertrophy. The ejection fraction was     measured by 3D assessment. The ejection fraction is 62%.  2. Aortic valve: Mild to moderate regurgitation.  3. Mitral valve: The annulus is mildly calcified. There is likely     prolapse, involving the anterior leaflet. Severe regurgitation.     Recommend PADMINI for further evaluation.  4. Left atrium: The atrium is moderately dilated.  5. Right ventricle: Systolic pressure is within the normal range. The     estimated peak pressure is 26mm Hg.  6. Inferior vena cava: The vessel is normal in size.  7. Dr. Narciso Cárdenas notified of findings.    NM Stress test 03/2/2021:   IMPRESSION:  1.   Abnormal myocardial perfusion study, demonstrating a moderate  predominantly fixed perfusion defect in the mid anterior wall (LAD  distribution), with minimal peripheral reversibility.  2.   Fixed nonreversible perfusion defect throughout the inferior wall may  represent diaphragmatic artifact or an additional area of remote infarct in  the RCA territory.   3.   Estimated left ventricular ejection fraction is 63%.  4.   Wall motion analysis is normal.    ASSESSMENT AND PLAN:     Impression:   This is a 57 yo M with a PMHx of HTN, HLD, severe mitral valve regurgitation who presents with atypical chest pain symptoms and severe symptomatic MR.     Assessment:   Atypical chest pain   Severe symptomatic Mitral valve regurgitation  HLD  HTN     Plan:  R and Left cardiac cath today.   Keep NPO after midnight for PADMINI tomorrow.   Continue Lisinopril 10mg po daily   Continue Crestor 40mg po daily.   Start Lasix 20mg PO BID.  Strict I/Os  Daily weights     Pt was seen and  discussed with Dr. Carmona.       Rasta Pemberton  PGY-4 Cardiology Fellow.     Rasta Pack MD  3/2/61053:16 PM   haylee all pertinent systems normal

## 2023-06-10 NOTE — ED PROVIDER NOTE - DIFFERENTIAL DIAGNOSIS
Rule out acute infection, UTI, pneumonia, acute viral illness, intracranial hemorrhage, electrolyte abnormality, other acute pathology Differential Diagnosis

## 2023-06-10 NOTE — ED PROVIDER NOTE - OBJECTIVE STATEMENT
73-year-old male with a history of hypertension, high cholesterol, type 2 diabetes, atrial fibrillation on Eliquis, traumatic brain injury 2 years ago with intracranial hemorrhage presents with increased altered mental status today.  Patient is usually minimally verbal, but since the afternoon has been more lethargic than usual, slow to open his eyes and more weak and lethargic than usual.  No acute pain.  No recent fall or new trauma.  No cough/URI.  No known COVID exposure.  No evidence of abdominal pain.  No vomiting or diarrhea.  No other acute complaints.  No other history available.

## 2023-06-10 NOTE — ED PROVIDER NOTE - PROGRESS NOTE DETAILS
Pt signed out to me by Dr. Bunch to f/u labs and CT head  Labs reviewed. suggestive of sepsis. UA pending. Abx ordered  CT head results d/w pt's wife. Informed her of new 6mm SDH. Pt is on Eliquis 10mg bid. Will give does of Kcentra. Will consult neurosx via transfer center. Pt's wife reports this late afternoon she noticed pt was feeling warm and was altered from his baseline. Pt signed out to me by Dr. Bunch to f/u labs and CT head  Labs reviewed. suggestive of sepsis. UA pending. Abx ordered  CT head results d/w pt's wife. Informed her of new 6mm SDH. Pt is on Eliquis 10mg bid. Will give does of Kcentra. Will consult neurosx via transfer center. Pt's wife reports this late afternoon she noticed pt was feeling warm and was altered from his baseline.  Upon my evaluation, this patient has a high probability of imminent or life-threatening deterioration which required my direct attention, intervention and personal management.  I have personally provided the amount of critical care time documented above excluding time spent on separate procedures. Case d/w Dr. Edmondson (Neurosx) at [facility], accepted for transfer to ED.  Wife informed, signed consent

## 2023-06-10 NOTE — ED ADULT TRIAGE NOTE - CHIEF COMPLAINT QUOTE
Patient is a 74yo male complaining of ams Patient family states that he is more lethargic and fails to interact with family members at his normal base line Patient has history of TBI and wounds on the back

## 2023-06-10 NOTE — ED PROVIDER NOTE - CLINICAL SUMMARY MEDICAL DECISION MAKING FREE TEXT BOX
General weakness, increased lethargy from usual mental status.  No known fever.  Will check labs, CT, UA, RVP, IV fluids, reeval

## 2023-06-11 ENCOUNTER — INPATIENT (INPATIENT)
Facility: HOSPITAL | Age: 73
LOS: 2 days | Discharge: HOME HEALTH | DRG: 871 | End: 2023-06-14
Attending: STUDENT IN AN ORGANIZED HEALTH CARE EDUCATION/TRAINING PROGRAM | Admitting: NEUROLOGICAL SURGERY
Payer: COMMERCIAL

## 2023-06-11 VITALS
OXYGEN SATURATION: 97 % | TEMPERATURE: 99 F | RESPIRATION RATE: 16 BRPM | SYSTOLIC BLOOD PRESSURE: 154 MMHG | HEART RATE: 94 BPM | DIASTOLIC BLOOD PRESSURE: 82 MMHG

## 2023-06-11 VITALS
TEMPERATURE: 98 F | SYSTOLIC BLOOD PRESSURE: 126 MMHG | HEIGHT: 68 IN | RESPIRATION RATE: 18 BRPM | OXYGEN SATURATION: 97 % | HEART RATE: 92 BPM | DIASTOLIC BLOOD PRESSURE: 80 MMHG

## 2023-06-11 DIAGNOSIS — Z98.890 OTHER SPECIFIED POSTPROCEDURAL STATES: Chronic | ICD-10-CM

## 2023-06-11 DIAGNOSIS — S06.5XAA TRAUMATIC SUBDURAL HEMORRHAGE WITH LOSS OF CONSCIOUSNESS STATUS UNKNOWN, INITIAL ENCOUNTER: ICD-10-CM

## 2023-06-11 LAB
ANION GAP SERPL CALC-SCNC: 11 MMOL/L — SIGNIFICANT CHANGE UP (ref 5–17)
APPEARANCE UR: CLEAR — SIGNIFICANT CHANGE UP
BILIRUB UR-MCNC: NEGATIVE — SIGNIFICANT CHANGE UP
BUN SERPL-MCNC: 17.9 MG/DL — SIGNIFICANT CHANGE UP (ref 8–20)
CALCIUM SERPL-MCNC: 8.6 MG/DL — SIGNIFICANT CHANGE UP (ref 8.4–10.5)
CHLORIDE SERPL-SCNC: 104 MMOL/L — SIGNIFICANT CHANGE UP (ref 96–108)
CO2 SERPL-SCNC: 24 MMOL/L — SIGNIFICANT CHANGE UP (ref 22–29)
COLOR SPEC: YELLOW — SIGNIFICANT CHANGE UP
CREAT SERPL-MCNC: 0.57 MG/DL — SIGNIFICANT CHANGE UP (ref 0.5–1.3)
DIFF PNL FLD: NEGATIVE — SIGNIFICANT CHANGE UP
EGFR: 105 ML/MIN/1.73M2 — SIGNIFICANT CHANGE UP
GLUCOSE BLDC GLUCOMTR-MCNC: 102 MG/DL — HIGH (ref 70–99)
GLUCOSE BLDC GLUCOMTR-MCNC: 115 MG/DL — HIGH (ref 70–99)
GLUCOSE BLDC GLUCOMTR-MCNC: 130 MG/DL — HIGH (ref 70–99)
GLUCOSE BLDC GLUCOMTR-MCNC: 194 MG/DL — HIGH (ref 70–99)
GLUCOSE SERPL-MCNC: 111 MG/DL — HIGH (ref 70–99)
GLUCOSE UR QL: NEGATIVE MG/DL — SIGNIFICANT CHANGE UP
HCT VFR BLD CALC: 34.8 % — LOW (ref 39–50)
HGB BLD-MCNC: 11.2 G/DL — LOW (ref 13–17)
KETONES UR-MCNC: NEGATIVE — SIGNIFICANT CHANGE UP
LACTATE SERPL-SCNC: 1.5 MMOL/L — SIGNIFICANT CHANGE UP (ref 0.5–2)
LEUKOCYTE ESTERASE UR-ACNC: NEGATIVE — SIGNIFICANT CHANGE UP
MAGNESIUM SERPL-MCNC: 1.8 MG/DL — SIGNIFICANT CHANGE UP (ref 1.6–2.6)
MCHC RBC-ENTMCNC: 24.6 PG — LOW (ref 27–34)
MCHC RBC-ENTMCNC: 32.2 GM/DL — SIGNIFICANT CHANGE UP (ref 32–36)
MCV RBC AUTO: 76.3 FL — LOW (ref 80–100)
MRSA PCR RESULT.: DETECTED
NITRITE UR-MCNC: NEGATIVE — SIGNIFICANT CHANGE UP
PH UR: 7 — SIGNIFICANT CHANGE UP (ref 5–8)
PHOSPHATE SERPL-MCNC: 2.9 MG/DL — SIGNIFICANT CHANGE UP (ref 2.4–4.7)
PLATELET # BLD AUTO: 236 K/UL — SIGNIFICANT CHANGE UP (ref 150–400)
POTASSIUM SERPL-MCNC: 3.6 MMOL/L — SIGNIFICANT CHANGE UP (ref 3.5–5.3)
POTASSIUM SERPL-SCNC: 3.6 MMOL/L — SIGNIFICANT CHANGE UP (ref 3.5–5.3)
PROT UR-MCNC: 15
RAPID RVP RESULT: SIGNIFICANT CHANGE UP
RBC # BLD: 4.56 M/UL — SIGNIFICANT CHANGE UP (ref 4.2–5.8)
RBC # FLD: 16.7 % — HIGH (ref 10.3–14.5)
RBC CASTS # UR COMP ASSIST: SIGNIFICANT CHANGE UP /HPF (ref 0–4)
S AUREUS DNA NOSE QL NAA+PROBE: DETECTED
SARS-COV-2 RNA SPEC QL NAA+PROBE: SIGNIFICANT CHANGE UP
SODIUM SERPL-SCNC: 139 MMOL/L — SIGNIFICANT CHANGE UP (ref 135–145)
SP GR SPEC: 1 — LOW (ref 1.01–1.02)
UROBILINOGEN FLD QL: NEGATIVE MG/DL — SIGNIFICANT CHANGE UP
WBC # BLD: 15.31 K/UL — HIGH (ref 3.8–10.5)
WBC # FLD AUTO: 15.31 K/UL — HIGH (ref 3.8–10.5)
WBC UR QL: SIGNIFICANT CHANGE UP /HPF (ref 0–5)

## 2023-06-11 PROCEDURE — 71045 X-RAY EXAM CHEST 1 VIEW: CPT

## 2023-06-11 PROCEDURE — 70450 CT HEAD/BRAIN W/O DYE: CPT | Mod: 26

## 2023-06-11 PROCEDURE — 83605 ASSAY OF LACTIC ACID: CPT

## 2023-06-11 PROCEDURE — 83880 ASSAY OF NATRIURETIC PEPTIDE: CPT

## 2023-06-11 PROCEDURE — 96375 TX/PRO/DX INJ NEW DRUG ADDON: CPT

## 2023-06-11 PROCEDURE — 99221 1ST HOSP IP/OBS SF/LOW 40: CPT

## 2023-06-11 PROCEDURE — 99223 1ST HOSP IP/OBS HIGH 75: CPT

## 2023-06-11 PROCEDURE — 85610 PROTHROMBIN TIME: CPT

## 2023-06-11 PROCEDURE — 93005 ELECTROCARDIOGRAM TRACING: CPT

## 2023-06-11 PROCEDURE — 87040 BLOOD CULTURE FOR BACTERIA: CPT

## 2023-06-11 PROCEDURE — 93970 EXTREMITY STUDY: CPT | Mod: 26

## 2023-06-11 PROCEDURE — 99222 1ST HOSP IP/OBS MODERATE 55: CPT

## 2023-06-11 PROCEDURE — 84484 ASSAY OF TROPONIN QUANT: CPT

## 2023-06-11 PROCEDURE — 85025 COMPLETE CBC W/AUTO DIFF WBC: CPT

## 2023-06-11 PROCEDURE — 70450 CT HEAD/BRAIN W/O DYE: CPT | Mod: MA

## 2023-06-11 PROCEDURE — 99285 EMERGENCY DEPT VISIT HI MDM: CPT

## 2023-06-11 PROCEDURE — 99285 EMERGENCY DEPT VISIT HI MDM: CPT | Mod: 25

## 2023-06-11 PROCEDURE — 85730 THROMBOPLASTIN TIME PARTIAL: CPT

## 2023-06-11 PROCEDURE — 96374 THER/PROPH/DIAG INJ IV PUSH: CPT

## 2023-06-11 PROCEDURE — 93306 TTE W/DOPPLER COMPLETE: CPT | Mod: 26

## 2023-06-11 PROCEDURE — 82550 ASSAY OF CK (CPK): CPT

## 2023-06-11 PROCEDURE — 80053 COMPREHEN METABOLIC PANEL: CPT

## 2023-06-11 PROCEDURE — 0225U NFCT DS DNA&RNA 21 SARSCOV2: CPT

## 2023-06-11 PROCEDURE — 36415 COLL VENOUS BLD VENIPUNCTURE: CPT

## 2023-06-11 RX ORDER — VANCOMYCIN HCL 1 G
750 VIAL (EA) INTRAVENOUS EVERY 12 HOURS
Refills: 0 | Status: DISCONTINUED | OUTPATIENT
Start: 2023-06-11 | End: 2023-06-12

## 2023-06-11 RX ORDER — MUPIROCIN 20 MG/G
1 OINTMENT TOPICAL
Refills: 0 | Status: DISCONTINUED | OUTPATIENT
Start: 2023-06-11 | End: 2023-06-14

## 2023-06-11 RX ORDER — APIXABAN 2.5 MG/1
1 TABLET, FILM COATED ORAL
Qty: 0 | Refills: 0 | DISCHARGE

## 2023-06-11 RX ORDER — ATORVASTATIN CALCIUM 80 MG/1
40 TABLET, FILM COATED ORAL AT BEDTIME
Refills: 0 | Status: DISCONTINUED | OUTPATIENT
Start: 2023-06-11 | End: 2023-06-14

## 2023-06-11 RX ORDER — MUPIROCIN 20 MG/G
1 OINTMENT TOPICAL
Qty: 0 | Refills: 0 | DISCHARGE

## 2023-06-11 RX ORDER — METHYLPHENIDATE HCL 5 MG
1 TABLET ORAL
Qty: 0 | Refills: 0 | DISCHARGE

## 2023-06-11 RX ORDER — LOSARTAN POTASSIUM 100 MG/1
50 TABLET, FILM COATED ORAL
Refills: 0 | Status: DISCONTINUED | OUTPATIENT
Start: 2023-06-11 | End: 2023-06-14

## 2023-06-11 RX ORDER — PROTHROMBIN COMPLEX CONCENTRATE (HUMAN) 25.5; 16.5; 24; 22; 22; 26 [IU]/ML; [IU]/ML; [IU]/ML; [IU]/ML; [IU]/ML; [IU]/ML
2000 POWDER, FOR SOLUTION INTRAVENOUS ONCE
Refills: 0 | Status: COMPLETED | OUTPATIENT
Start: 2023-06-11 | End: 2023-06-11

## 2023-06-11 RX ORDER — SODIUM CHLORIDE 9 MG/ML
1000 INJECTION INTRAMUSCULAR; INTRAVENOUS; SUBCUTANEOUS
Refills: 0 | Status: DISCONTINUED | OUTPATIENT
Start: 2023-06-11 | End: 2023-06-13

## 2023-06-11 RX ORDER — DEXTROSE 50 % IN WATER 50 %
25 SYRINGE (ML) INTRAVENOUS ONCE
Refills: 0 | Status: DISCONTINUED | OUTPATIENT
Start: 2023-06-11 | End: 2023-06-14

## 2023-06-11 RX ORDER — CEFEPIME 1 G/1
1000 INJECTION, POWDER, FOR SOLUTION INTRAMUSCULAR; INTRAVENOUS EVERY 8 HOURS
Refills: 0 | Status: DISCONTINUED | OUTPATIENT
Start: 2023-06-11 | End: 2023-06-12

## 2023-06-11 RX ORDER — CHLORHEXIDINE GLUCONATE 213 G/1000ML
1 SOLUTION TOPICAL
Refills: 0 | Status: DISCONTINUED | OUTPATIENT
Start: 2023-06-11 | End: 2023-06-14

## 2023-06-11 RX ORDER — LATANOPROST 0.05 MG/ML
1 SOLUTION/ DROPS OPHTHALMIC; TOPICAL
Qty: 0 | Refills: 0 | DISCHARGE

## 2023-06-11 RX ORDER — INSULIN LISPRO 100/ML
VIAL (ML) SUBCUTANEOUS EVERY 6 HOURS
Refills: 0 | Status: DISCONTINUED | OUTPATIENT
Start: 2023-06-11 | End: 2023-06-13

## 2023-06-11 RX ORDER — GABAPENTIN 400 MG/1
400 CAPSULE ORAL
Refills: 0 | Status: DISCONTINUED | OUTPATIENT
Start: 2023-06-11 | End: 2023-06-14

## 2023-06-11 RX ORDER — DEXTROSE 50 % IN WATER 50 %
12.5 SYRINGE (ML) INTRAVENOUS ONCE
Refills: 0 | Status: DISCONTINUED | OUTPATIENT
Start: 2023-06-11 | End: 2023-06-14

## 2023-06-11 RX ORDER — MIRTAZAPINE 45 MG/1
7.5 TABLET, ORALLY DISINTEGRATING ORAL AT BEDTIME
Refills: 0 | Status: DISCONTINUED | OUTPATIENT
Start: 2023-06-11 | End: 2023-06-14

## 2023-06-11 RX ORDER — CEFEPIME 1 G/1
1000 INJECTION, POWDER, FOR SOLUTION INTRAMUSCULAR; INTRAVENOUS EVERY 8 HOURS
Refills: 0 | Status: DISCONTINUED | OUTPATIENT
Start: 2023-06-11 | End: 2023-06-11

## 2023-06-11 RX ORDER — DILTIAZEM HCL 120 MG
60 CAPSULE, EXT RELEASE 24 HR ORAL EVERY 8 HOURS
Refills: 0 | Status: DISCONTINUED | OUTPATIENT
Start: 2023-06-11 | End: 2023-06-14

## 2023-06-11 RX ADMIN — SODIUM CHLORIDE 60 MILLILITER(S): 9 INJECTION INTRAMUSCULAR; INTRAVENOUS; SUBCUTANEOUS at 10:23

## 2023-06-11 RX ADMIN — GABAPENTIN 400 MILLIGRAM(S): 400 CAPSULE ORAL at 23:28

## 2023-06-11 RX ADMIN — LOSARTAN POTASSIUM 50 MILLIGRAM(S): 100 TABLET, FILM COATED ORAL at 17:37

## 2023-06-11 RX ADMIN — Medication 250 MILLIGRAM(S): at 22:15

## 2023-06-11 RX ADMIN — Medication 60 MILLIGRAM(S): at 14:00

## 2023-06-11 RX ADMIN — PROTHROMBIN COMPLEX CONCENTRATE (HUMAN) 400 INTERNATIONAL UNIT(S): 25.5; 16.5; 24; 22; 22; 26 POWDER, FOR SOLUTION INTRAVENOUS at 00:24

## 2023-06-11 RX ADMIN — CEFEPIME 100 MILLIGRAM(S): 1 INJECTION, POWDER, FOR SOLUTION INTRAMUSCULAR; INTRAVENOUS at 00:07

## 2023-06-11 RX ADMIN — Medication 250 MILLIGRAM(S): at 12:13

## 2023-06-11 RX ADMIN — SODIUM CHLORIDE 60 MILLILITER(S): 9 INJECTION INTRAMUSCULAR; INTRAVENOUS; SUBCUTANEOUS at 22:12

## 2023-06-11 RX ADMIN — GABAPENTIN 400 MILLIGRAM(S): 400 CAPSULE ORAL at 12:14

## 2023-06-11 RX ADMIN — CHLORHEXIDINE GLUCONATE 1 APPLICATION(S): 213 SOLUTION TOPICAL at 06:48

## 2023-06-11 RX ADMIN — ATORVASTATIN CALCIUM 40 MILLIGRAM(S): 80 TABLET, FILM COATED ORAL at 22:01

## 2023-06-11 RX ADMIN — CEFEPIME 1000 MILLIGRAM(S): 1 INJECTION, POWDER, FOR SOLUTION INTRAMUSCULAR; INTRAVENOUS at 14:01

## 2023-06-11 RX ADMIN — SODIUM CHLORIDE 75 MILLILITER(S): 9 INJECTION INTRAMUSCULAR; INTRAVENOUS; SUBCUTANEOUS at 08:01

## 2023-06-11 RX ADMIN — MUPIROCIN 1 APPLICATION(S): 20 OINTMENT TOPICAL at 17:38

## 2023-06-11 RX ADMIN — MIRTAZAPINE 7.5 MILLIGRAM(S): 45 TABLET, ORALLY DISINTEGRATING ORAL at 22:01

## 2023-06-11 RX ADMIN — GABAPENTIN 400 MILLIGRAM(S): 400 CAPSULE ORAL at 17:36

## 2023-06-11 RX ADMIN — Medication 60 MILLIGRAM(S): at 22:01

## 2023-06-11 RX ADMIN — CEFEPIME 1000 MILLIGRAM(S): 1 INJECTION, POWDER, FOR SOLUTION INTRAMUSCULAR; INTRAVENOUS at 22:00

## 2023-06-11 RX ADMIN — Medication 2: at 17:37

## 2023-06-11 NOTE — ED PROVIDER NOTE - OBJECTIVE STATEMENT
74 y/o M hx of TBI, afib on eliquis, HTN, HLD transfer from [facility] 74 y/o M hx of TBI, afib on eliquis, HTN, HLD transfer from [facility] for subdural hematoma. patient endorsing no acute complaints but minimally verbal at baseline per [place] note. unable to obtain further history. patient w/ elevated WBC 74 y/o M hx of TBI, afib on eliquis, HTN, HLD transfer from [facility] for subdural hematoma. patient endorsing no acute complaints but minimally verbal at baseline per [place] note. unable to obtain further history. patient w/ elevated WBC per chart review.

## 2023-06-11 NOTE — ED ADULT NURSE REASSESSMENT NOTE - NURSING NEURO ORIENTATION
disoriented to time/situation
disoriented to time/situation
oriented to person, place and time/disoriented to time/situation
oriented to person, place and time

## 2023-06-11 NOTE — ED ADULT NURSE REASSESSMENT NOTE - NS ED NURSE REASSESS COMMENT FT1
Nurse was given report at [place], vital signs were pending was disconnected while giving report, I called back, but they were unable to find pt in system. she was informed pt was getting Kcentra and to follow up with [place] and urine. DISPLAY PLAN FREE TEXT

## 2023-06-11 NOTE — CONSULT NOTE ADULT - SUBJECTIVE AND OBJECTIVE BOX
HISTORY OF PRESENT ILLNESS:   73yM w/ PMHx of DM, HTN/HLD, Afib on Eliquis, TBI s/p R SDH evac in 9/2021, traumatic SDH 10/22 presented to [facility] with AMS. Wife reports pt was more lethargic and altered from his baseline. Reports he was not responsive to questions like he normally is this afternoon. Reports he was able to eat but when she put him to bed, he felt hot so she called EMS. CTH at [facility] showed 6mm posterior falx SDH. Pt was also noted to have leukocytosis and elevated lactate. Transferred to [facility] for further neurosurgical eval. At baseline, pt is AOx2, and responsive to simple questions. Does not move b/l LEs at baseline and RUE is contracted but does have spontaneous movement in LUE with some control. On exam today, pt seems to be at his baseline. Wife reports this is improvement from how he was acting earlier.     PAST MEDICAL & SURGICAL HISTORY:  TBI (traumatic brain injury)      HTN (hypertension)      Atrial fibrillation      Peripheral neuropathy      Major depression      HLD (hyperlipidemia)      CAD (coronary artery disease)      BPH (benign prostatic hyperplasia)      Pneumonia due to COVID-19 virus  June 2022      Hemorrhage in the brain      H/O craniotomy        FAMILY HISTORY:  FH: HTN (hypertension) (Father, Mother, Sibling)    Family history of bone cancer (Sibling)    FH: Alzheimers disease (Sibling)        SOCIAL HISTORY:  Tobacco Use:  EtOH use:   Substance:    Allergies    No Known Allergies    Intolerances        REVIEW OF SYSTEMS  Negative except as noted in HPI    HOME MEDICATIONS:  Home Medications:  apixaban 10 mg oral tablet: 1 tab(s) orally 2 times a day  dilTIAZem 240 oral capsule, extended release: 1 cap(s) orally once a day   fluticasone 50 mcg/inh nasal spray: 1 spray(s) nasal 2 times a day, As needed, nasal congestion (10 Eduin 2023 14:19)  gabapentin 400 mg oral tablet: 1 tab(s) orally 3 times a day   hydrALAZINE 50 mg oral tablet: 50 milligram(s) orally 3 times a day  Modafinil 100mg QD  Metformin 1000mg BID  Rosuvastatin 10mg QD   mirtazapine 7.5 mg oral tablet: 1 tab(s) orally once a day (at bedtime) (07 Jan 2023 18:06)        Vital Signs Last 24 Hrs  T(C): 36.4 (11 Jun 2023 01:10), Max: 37.2 (11 Jun 2023 00:24)  T(F): 97.5 (11 Jun 2023 01:10), Max: 99 (11 Jun 2023 00:24)  HR: 98 (11 Jun 2023 01:33) (92 - 98)  BP: 127/78 (11 Jun 2023 01:33) (126/80 - 151/82)  BP(mean): 97 (11 Jun 2023 01:33) (97 - 97)  RR: 16 (11 Jun 2023 01:33) (16 - 18)  SpO2: 98% (11 Jun 2023 01:33) (96% - 98%)    Parameters below as of 11 Jun 2023 01:33  Patient On (Oxygen Delivery Method): room air          PHYSICAL EXAM:  GENERAL: NAD  HEAD:  Atraumatic, normocephalic  EYES: Conjunctiva and sclera clear  ENMT: Good dentition  HERB COMA SCORE: E- V- M- = 15       E: 4= opens eyes spontaneously 3= to voice 2= to noxious 1= no opening       V: 5= oriented 4= confused 3= inappropriate words 2= incomprehensible sounds 1= nonverbal 1T= intubated       M: 6= follows commands 5= localizes 4= withdraws 3= flexor posturing 2= extensor posturing 1= no movement  MENTAL STATUS: AAO x2; Awake; Opens eyes spontaneously; Answers simple questions; following simple commands  CRANIAL NERVES: L gaze preference but able to cross midline; PERRL. Facial sensation intact V1-3 distribution b/l. Face symmetric w/ normal eye closure and smile, tongue midline. Hearing grossly intact. Speech mostly clear. Shoulder shrug intact.   REFLEXES: PERRL.   MOTOR: RUE contracted; Able to  hands b/l L>R; b/l LEs withdraw  SENSATION: Difficult to assess but intact to noxious in all extremities  COORDINATION: Gait testing deferred  CHEST/LUNG: Nonlabored on room air  SKIN: Significant erythematous rash throughout body to include all extremities and trunk; TNTC plaques throughout b/l UE and LEs    LABS:                        11.3   20.97 )-----------( 255      ( 10 Juan 2023 22:19 )             35.7     06-10    141  |  109<H>  |  23  ----------------------------<  137<H>  4.1   |  27  |  0.76    Ca    8.5      10 Juan 2023 22:19    TPro  5.8<L>  /  Alb  2.9<L>  /  TBili  0.3  /  DBili  x   /  AST  10<L>  /  ALT  17  /  AlkPhos  80  06-10    PT/INR - ( 10 Juan 2023 22:19 )   PT: 17.0 sec;   INR: 1.45 ratio         PTT - ( 10 Juan 2023 22:19 )  PTT:36.6 sec      CULTURES:    RADIOLOGY & ADDITIONAL STUDIES:    CT Head No Cont (06.10.23 @ 21:43)   IMPRESSION:  Acute subdural hematoma along the posterior falx measuring up to 6 mm in   thickness; no evidence of intracranial herniation.     TOMMY CROSS MD; Attending Radiologist  This document has been electronically signed. Juan 10 2023 10:55PM

## 2023-06-11 NOTE — SWALLOW BEDSIDE ASSESSMENT ADULT - SLP GENERAL OBSERVATIONS
Pt received in bed, awake/alert, Ox1, nodding/shaking head to yes/no questions, delayed response time, tolerating RA, 0/10 pain pre & post

## 2023-06-11 NOTE — ED ADULT NURSE NOTE - OBJECTIVE STATEMENT
pt presents as transfer after wife noticed pt has been more altered and confused at home at approx 4pm. pt went to [place] and found to have 6mm posterior SDH. pt noted to have WBC of 20, received Maxipime and Kcentra PTA. stage 2 on R scapula and stage 3 on sacrum. Chow in place on arrival. Wife to bedside, updated on plan of care.

## 2023-06-11 NOTE — ED ADULT NURSE REASSESSMENT NOTE - NIH STROKE SCALE: 9. BEST LANGUAGE, QM
(1) Mild-to-moderate aphasia; some obvious loss of fluency or facility of comprehension, w/o significant limitation on ideas expressed or form of expression. Reduction of speech and/or comprehension, however, makes conversation about provided material difficult or impossible.  For example, in conversation about provided materials, examiner can identify picture or naming card content from patient's response.

## 2023-06-11 NOTE — H&P ADULT - NSHPLABSRESULTS_GEN_ALL_CORE
LABS:                        11.3   20.97 )-----------( 255      ( 10 Juan 2023 22:19 )             35.7     06-10    141  |  109<H>  |  23  ----------------------------<  137<H>  4.1   |  27  |  0.76    Ca    8.5      10 Juan 2023 22:19    TPro  5.8<L>  /  Alb  2.9<L>  /  TBili  0.3  /  DBili  x   /  AST  10<L>  /  ALT  17  /  AlkPhos  80  06-10    PT/INR - ( 10 Juan 2023 22:19 )   PT: 17.0 sec;   INR: 1.45 ratio         PTT - ( 10 Juan 2023 22:19 )  PTT:36.6 sec      RADIOLOGY & ADDITIONAL TESTS:   < from: CT Head No Cont (06.10.23 @ 21:43) >  IMPRESSION:  Acute subdural hematoma along the posterior falx measuring up to 6 mm in   thickness; no evidence of intracranial herniation.

## 2023-06-11 NOTE — ED ADULT NURSE NOTE - OBJECTIVE STATEMENT
Wife stated he was more lethargic than usual around 4p>Fs reported around 118. Pt is responsive to tactile stimulus

## 2023-06-11 NOTE — ED ADULT NURSE NOTE - CHIEF COMPLAINT QUOTE
BIBEMS as transfer from [place] for Neuro s/p wife noticing that he had changes in his mental status. PMH  of TBI, nonverbal at baseline, on Eliquis for Afib, given KCCENTRA at previous facility. Arrives with stage 2 on R scapula and stage 3 on sacrum, dressed by home care RN. CT showed 6mm acute posterior SDH.

## 2023-06-11 NOTE — PROGRESS NOTE ADULT - SUBJECTIVE AND OBJECTIVE BOX
[facility] Division of Hospital Medicine  Transfer to Medicine   Neuro ICU to Medicine    SUBJECTIVE / OVERNIGHT EVENTS:  72 y/o male with PMHx of DM, HTN, HLD, Afib on Eliquis, TBI s/p R SDH evac in 2021, traumatic SDH 10/22 presented to [facility] with AMS/ lethargy. At baseline patient is minimally responsive, able to respond simple questions, contracted in RUE and withdraws in b/l LE. Upon arrival to [place] ED, CTH revealed 6mm posterior falx SDH. Patient was also noted to have leukocytosis 20.97 and elevated lactate 3.3. Given 1g Vancomycin, 1g Cefepime, 2L NS bolus, CXR done, blood cultures sent. Eliquis reversed with KCentra and patient transferred to [facility] for further neurosurgical evaluation. Admitted to Neuro ICU, now deemed stable and no plan for surgical intervention. Transfer to medicine for management of sepsis and NSGY to follow.   Patient is now back to baseline mental status     Patient denies chest pain, SOB, abd pain, N/V, fever, chills, dysuria or any other complaints. All remainder ROS negative.     MEDICATIONS  (STANDING):  cefepime  Injectable. 1000 milliGRAM(s) IV Push every 8 hours  chlorhexidine 2% Cloths 1 Application(s) Topical <User Schedule>  dextrose 50% Injectable 12.5 Gram(s) IV Push once  dextrose 50% Injectable 25 Gram(s) IV Push once  dextrose 50% Injectable 25 Gram(s) IV Push once  diltiazem    Tablet 60 milliGRAM(s) Oral every 8 hours  gabapentin 400 milliGRAM(s) Oral four times a day  insulin lispro (ADMELOG) corrective regimen sliding scale   SubCutaneous every 6 hours  mirtazapine 7.5 milliGRAM(s) Oral at bedtime  mupirocin 2% Nasal 1 Application(s) Both Nostrils two times a day  sodium chloride 0.9%. 1000 milliLiter(s) (60 mL/Hr) IV Continuous <Continuous>  vancomycin  IVPB 750 milliGRAM(s) IV Intermittent every 12 hours    MEDICATIONS  (PRN):        I&O's Summary    10 Juan 2023 07:01  -  2023 07:00  --------------------------------------------------------  IN: 0 mL / OUT: 300 mL / NET: -300 mL    2023 07:01  -  2023 14:26  --------------------------------------------------------  IN: 760 mL / OUT: 120 mL / NET: 640 mL        PHYSICAL EXAM:  Vital Signs Last 24 Hrs  T(C): 36.7 (2023 11:52), Max: 37.2 (2023 00:24)  T(F): 98.1 (2023 11:52), Max: 99 (2023 00:24)  HR: 94 (2023 14:00) (81 - 101)  BP: 139/92 (2023 14:00) (108/82 - 151/82)  BP(mean): 108 (2023 14:00) (89 - 113)  RR: 14 (2023 14:00) (11 - 18)  SpO2: 98% (2023 14:00) (96% - 100%)    Parameters below as of 2023 13:00  Patient On (Oxygen Delivery Method): room air            CONSTITUTIONAL: NAD, appears stated age  ENMT: Moist oral mucosa, no pharyngeal injection or exudates; normal dentition  RESPIRATORY: Normal respiratory effort; clear to auscultation bilaterally  CARDIOVASCULAR: Regular rate and rhythm, normal S1 and S2, no murmur/rub/gallop; Peripheral pulses are 2+ bilaterally  ABDOMEN: Nontender to palpation, normoactive bowel sounds, no rebound/guarding;   MUSCLOSKELETAL:  No clubbing or cyanosis of digits; no joint swelling or tenderness to palpation  PSYCH: A+O to person, affect appropriate  NEUROLOGY: CN 2-12 are intact and symmetric; no gross sensory deficits;   SKIN: No rashes; no palpable lesions    LABS:                        11.2   15.31 )-----------( 236      ( 2023 06:25 )             34.8     06-11    139  |  104  |  17.9  ----------------------------<  111<H>  3.6   |  24.0  |  0.57    Ca    8.6      2023 06:25  Phos  2.9     06-  Mg     1.8     06-11    TPro  5.8<L>  /  Alb  2.9<L>  /  TBili  0.3  /  DBili  x   /  AST  10<L>  /  ALT  17  /  AlkPhos  80  06-10    PT/INR - ( 10 Juan 2023 22:19 )   PT: 17.0 sec;   INR: 1.45 ratio         PTT - ( 10 Juan 2023 22:19 )  PTT:36.6 sec  CARDIAC MARKERS ( 10 Juan 2023 22:19 )  x     / x     / 56 U/L / x     / x          Urinalysis Basic - ( 2023 09:47 )    Color: Yellow / Appearance: Clear / S.005 / pH: x  Gluc: x / Ketone: Negative  / Bili: Negative / Urobili: Negative mg/dL   Blood: x / Protein: 15 / Nitrite: Negative   Leuk Esterase: Negative / RBC: 0-2 /HPF / WBC 0-2 /HPF   Sq Epi: x / Non Sq Epi: x / Bacteria: x        CAPILLARY BLOOD GLUCOSE      POCT Blood Glucose.: 102 mg/dL (2023 12:20)  POCT Blood Glucose.: 115 mg/dL (2023 06:50)        RADIOLOGY & ADDITIONAL TESTS:  Results Reviewed:   Imaging Personally Reviewed:  Electrocardiogram Personally Reviewed:                                           [facility] Division of Hospital Medicine  Transfer to Medicine   Neuro ICU to Medicine    SUBJECTIVE / OVERNIGHT EVENTS:  74 y/o male with PMHx of DM, HTN, HLD, Afib on Eliquis, TBI s/p R SDH evac in 2021, traumatic SDH 10/22 presented to [facility] with AMS/ lethargy. At baseline patient is minimally responsive, able to respond simple questions, contracted in RUE and withdraws in b/l LE. Upon arrival to [place] ED, CTH revealed 6mm posterior falx SDH. Patient was also noted to have leukocytosis 20.97 and elevated lactate 3.3. Given 1g Vancomycin, 1g Cefepime, 2L NS bolus, CXR done, blood cultures sent. Eliquis reversed with KCentra and patient transferred to [facility] for further neurosurgical evaluation. Admitted to Neuro ICU, now deemed stable and no plan for surgical intervention. Transfer to medicine for management of sepsis and NSGY to follow.   Patient is now back to baseline mental status     Patient denies chest pain, SOB, abd pain, N/V, fever, chills, dysuria or any other complaints. All remainder ROS negative.     Family Hx - neg  social hist - non smoker   allergies  - nka  surgical history - sdh evacuation     MEDICATIONS  (STANDING):  cefepime  Injectable. 1000 milliGRAM(s) IV Push every 8 hours  chlorhexidine 2% Cloths 1 Application(s) Topical <User Schedule>  dextrose 50% Injectable 12.5 Gram(s) IV Push once  dextrose 50% Injectable 25 Gram(s) IV Push once  dextrose 50% Injectable 25 Gram(s) IV Push once  diltiazem    Tablet 60 milliGRAM(s) Oral every 8 hours  gabapentin 400 milliGRAM(s) Oral four times a day  insulin lispro (ADMELOG) corrective regimen sliding scale   SubCutaneous every 6 hours  mirtazapine 7.5 milliGRAM(s) Oral at bedtime  mupirocin 2% Nasal 1 Application(s) Both Nostrils two times a day  sodium chloride 0.9%. 1000 milliLiter(s) (60 mL/Hr) IV Continuous <Continuous>  vancomycin  IVPB 750 milliGRAM(s) IV Intermittent every 12 hours    MEDICATIONS  (PRN):        I&O's Summary    10 Juan 2023 07:01  -  2023 07:00  --------------------------------------------------------  IN: 0 mL / OUT: 300 mL / NET: -300 mL    2023 07:01  -  2023 14:26  --------------------------------------------------------  IN: 760 mL / OUT: 120 mL / NET: 640 mL        PHYSICAL EXAM:  Vital Signs Last 24 Hrs  T(C): 36.7 (2023 11:52), Max: 37.2 (2023 00:24)  T(F): 98.1 (2023 11:52), Max: 99 (2023 00:24)  HR: 94 (2023 14:00) (81 - 101)  BP: 139/92 (2023 14:00) (108/82 - 151/82)  BP(mean): 108 (2023 14:00) (89 - 113)  RR: 14 (2023 14:00) (11 - 18)  SpO2: 98% (2023 14:00) (96% - 100%)    Parameters below as of 2023 13:00  Patient On (Oxygen Delivery Method): room air            CONSTITUTIONAL: NAD, appears stated age  ENMT: Moist oral mucosa, no pharyngeal injection or exudates; normal dentition  RESPIRATORY: Normal respiratory effort; clear to auscultation bilaterally  CARDIOVASCULAR: Regular rate and rhythm, normal S1 and S2, no murmur/rub/gallop; Peripheral pulses are 2+ bilaterally  ABDOMEN: Nontender to palpation, normoactive bowel sounds, no rebound/guarding;   MUSCLOSKELETAL:  No clubbing or cyanosis of digits; no joint swelling or tenderness to palpation  PSYCH: A+O to person, affect appropriate  NEUROLOGY: CN 2-12 are intact and symmetric; UE Contacted. Weakness x 4 extrem   SKIN: No rashes; no palpable lesions    LABS:                        11.2   15.31 )-----------( 236      ( 2023 06:25 )             34.8     06-11    139  |  104  |  17.9  ----------------------------<  111<H>  3.6   |  24.0  |  0.57    Ca    8.6      2023 06:25  Phos  2.9     06-11  Mg     1.8     06-11    TPro  5.8<L>  /  Alb  2.9<L>  /  TBili  0.3  /  DBili  x   /  AST  10<L>  /  ALT  17  /  AlkPhos  80  06-10    PT/INR - ( 10 Juan 2023 22:19 )   PT: 17.0 sec;   INR: 1.45 ratio         PTT - ( 10 Juan 2023 22:19 )  PTT:36.6 sec  CARDIAC MARKERS ( 10 Juan 2023 22:19 )  x     / x     / 56 U/L / x     / x          Urinalysis Basic - ( 2023 09:47 )    Color: Yellow / Appearance: Clear / S.005 / pH: x  Gluc: x / Ketone: Negative  / Bili: Negative / Urobili: Negative mg/dL   Blood: x / Protein: 15 / Nitrite: Negative   Leuk Esterase: Negative / RBC: 0-2 /HPF / WBC 0-2 /HPF   Sq Epi: x / Non Sq Epi: x / Bacteria: x        CAPILLARY BLOOD GLUCOSE      POCT Blood Glucose.: 102 mg/dL (2023 12:20)  POCT Blood Glucose.: 115 mg/dL (2023 06:50)        RADIOLOGY & ADDITIONAL TESTS:  Results Reviewed:   Imaging Personally Reviewed:  Electrocardiogram Personally Reviewed:

## 2023-06-11 NOTE — SWALLOW BEDSIDE ASSESSMENT ADULT - SLP PERTINENT HISTORY OF CURRENT PROBLEM
As per MD note, "72 y/o male with PMHx of DM, HTN, HLD, Afib on Eliquis, TBI s/p R SDH evac in 9/2021, traumatic SDH 10/22 presented to [facility] with AMS. As per wife, patient was more lethargic and altered from his baseline, not responsive to questions. At baseline patient is minimally responsive, able to respond simple questions, contracted in RUE and withdraws in b/l LE. Last known well yesterday afternoon. Upon arrival to [place] ED, CTH revealed 6mm posterior falx SDH. Patient was also noted to have leukocytosis 20.97 and elevated lactate 3.3. Given 1g Vancomycin, 1g Cefepime, 2L NS bolus, CXR done, blood cultures sent, UA not sent. Eliquis reversed with KCentra and patient transferred to [facility] for further neurosurgical evaluation."

## 2023-06-11 NOTE — H&P ADULT - HISTORY OF PRESENT ILLNESS
72 y/o male with PMHx of DM, HTN, HLD, Afib on Eliquis, TBI s/p R SDH evac in 9/2021, traumatic SDH 10/22 presented to [facility] with AMS. As per wife, patient was more lethargic and altered from his baseline, not responsive to questions. At baseline patient is minimally responsive, able to respond simple questions, contracted in RUE and withdraws in b/l LE. Last known well yesterday afternoon. Upon arrival to [place] ED, CTH revealed 6mm posterior falx SDH. Patient was also noted to have leukocytosis 20.97 and elevated lactate 3.3. Given 1g Vancomycin, 1g Cefepime, 2L NS bolus, CXR done, blood cultures sent, UA not sent. Eliquis reversed with KCentra and patient transferred to [facility] for further neurosurgical evaluation.

## 2023-06-11 NOTE — SWALLOW BEDSIDE ASSESSMENT ADULT - SWALLOW EVAL: DIAGNOSIS
Overall swallow profile deemed functional for baseline diet of soft & bite sized, thin liquid. No overt s/s aspiration noted throughout intake.

## 2023-06-11 NOTE — ED PROVIDER NOTE - CLINICAL SUMMARY MEDICAL DECISION MAKING FREE TEXT BOX
74 y/o M hx of TBI, afib on eliquis, HTN, HLD transfer from [facility] for subdural hematoma. contracted upper extremities. neurosurg aware of patient, will admit to neuro ICU service. patient has no acute complaints currently.

## 2023-06-11 NOTE — ED PROVIDER NOTE - PHYSICAL EXAMINATION
General: Well appearing male in no acute distress  HEENT: Normocephalic, atraumatic. Moist mucous membranes. Oropharynx clear. No lymphadenopathy.  Eyes: No scleral icterus. EOMI. SHARA.  Neck:. Soft and supple. Full ROM without pain. No midline tenderness  Cardiac: Regular rate and regular rhythm. No murmurs, rubs, gallops. Peripheral pulses 2+ and symmetric. No LE edema.  Resp: Lungs CTAB. Speaking in full sentences. No wheezes, rales or rhonchi.  Abd: Soft, non-tender, non-distended. No guarding or rebound. No scars, masses, or lesions.  Back: Spine midline and non-tender. No CVA tenderness.    Skin: No rashes, abrasions, or lacerations.  Neuro: AO x 3. upper extremities flexed and contracted.

## 2023-06-11 NOTE — H&P ADULT - NSHPPHYSICALEXAM_GEN_ALL_CORE
Constitutional:  72 y/o male awake, alert in no acute distress.  Eyes:  Sclera anicteric, conjunctiva noninjected.  ENMT: Oropharyngeal mucosa moist, pink. Tongue midline.    Neck: Neck supple, FROM.    Respiratory: normal chest rise, nonlabored breathing   Cardiovascular: Regular rate and rhythm.  Gastrointestinal:  Soft, nontender, nondistended.   Vascular: Extremities warm, no ulcers, no discoloration of skin.   Neurological: AA&O x 2 to name and place   LUE HG 4/5 (squeezes/lets go to command), RUE HG 4/5 spastic otherwise 1/5, b/l LE WD  Sensation intact to light touch throughout.   2+ symmetric throughout.    unable to assess pronator drift/dysmetria due to spasticity   Skin: Warm, dry, macular rash along entire body from neck down.

## 2023-06-11 NOTE — ED ADULT NURSE NOTE - CHIEF COMPLAINT QUOTE
Patient is a 72yo male complaining of ams Patient family states that he is more lethargic and fails to interact with family members at his normal base line Patient has history of TBI and wounds on the back

## 2023-06-11 NOTE — ED ADULT NURSE REASSESSMENT NOTE - NS ED NURSE REASSESS COMMENT FT1
Pt. mental status unchanged. Pt. respirations even and unlabored. Pt. resting comfortably in stretcher. Pt. awaiting ICU bed.
RN called Neuro PA on lack of IV access for CT Angios. As per Neuro PA, complete non con CT head on way up to ICU and they will do the contrast scan at a later time.
As per wife, pt. is mostly returned to his baseline. Pt. doesn't not move lower extremities, or RUE. Pt. has some movement of LUE sometimes. Pt. is alert and answering questions, A&Ox2, which wife says is rakan

## 2023-06-11 NOTE — H&P ADULT - ASSESSMENT
ASSESSMENT:  74 y/o male with PMHx of DM, HTN, HLD, Afib on Eliquis, TBI s/p R SDH evac in 9/2021, traumatic SDH 10/22 presented to [facility] with AMS. CTH revealed 6mm posterior falx SDH. Patient was also noted to have leukocytosis 20.97 and elevated lactate 3.3. Given 1g Vancomycin, 1g Cefepime, 2L NS bolus, CXR done, blood cultures sent, UA not sent. Eliquis reversed with KCentra and patient transferred to [facility] for further neurosurgical evaluation.      PLAN:  Neuro:  - Q1 hour Neuro checks, Q1 hour Vitals  - HOB 30 degrees, Neck midline position  - cont home gabapentin, mirtazapine, modafinil  - repeat CTH   - Maintain normothermia, PO acetaminophen for temp>38 C or pain  - Pain control PRN  - Turn and Position Q2  	  CV:  - SBP Goal   - cont home losartan, hydralazine, diltiazem, rosuvastatin     Pulm:  - Supplemental O2 PRN to maintain Spo2>92%    GI:  - NPO until speech/swallow eval   - Bowel regimen: senna, miralax   	  Gu:  - Condom Cath  - NS@75 while NPO   - I&O Q1 hour  - Monitor Electrolytes & Renal Function    Heme:  - Monitor H&H  - hold home Eliquis   - Mechanical DVT Prophylaxis: Maintain B/L LE sequential compression devices  	  ID:  - Monitor WBC and Temperature  - cont empiric Vanc/Cefepime   - Follow cultures: blood cultures from [facility]     Endo  - Monitor BGL, maintain <180  - ISS

## 2023-06-11 NOTE — SWALLOW BEDSIDE ASSESSMENT ADULT - DIET PRIOR TO ADMI
Pt last seen at [facility] 1/9/2023 with recommendation for soft & bite sized thin liquid PO diet

## 2023-06-11 NOTE — CHART NOTE - NSCHARTNOTEFT_GEN_A_CORE
HPI:  HPI:  72 y/o male with PMHx of DM, HTN, HLD, Afib on Eliquis, TBI s/p R SDH evac in 2021, traumatic SDH 10/22 presented to [facility] with AMS. As per wife, patient was more lethargic and altered from his baseline, not responsive to questions. At baseline patient is minimally responsive, able to respond simple questions, contracted in RUE and withdraws in b/l LE. Last known well yesterday afternoon. Upon arrival to [facility] ED, CTH revealed 6mm posterior falx SDH. Patient was also noted to have leukocytosis 20.97 and elevated lactate 3.3. Given 1g Vancomycin, 1g Cefepime, 2L NS bolus, CXR done, blood cultures sent, UA not sent. Eliquis reversed with KCentra and patient transferred to [facility] for further neurosurgical evaluation.   (2023 03:14)      INTERVAL HPI/OVERNIGHT EVENTS:  73y Male s/p __ seen lying comfortably in bed. Tolerating diet. Passing gas/BM. Voiding. Chow in with __ clear urine. Denies headache, weakness, numbness, n/v/d, fevers, chills, chest pain, SOB.     Vital Signs Last 24 Hrs  T(C): 36.7 (2023 11:52), Max: 37.2 (2023 00:24)  T(F): 98.1 (2023 11:52), Max: 99 (2023 00:24)  HR: 94 (2023 14:00) (81 - 101)  BP: 139/92 (2023 14:00) (108/82 - 151/82)  BP(mean): 108 (2023 14:00) (89 - 113)  RR: 14 (2023 14:00) (11 - 18)  SpO2: 98% (2023 14:00) (96% - 100%)    Parameters below as of 2023 13:00  Patient On (Oxygen Delivery Method): room air        PHYSICAL EXAM:  GENERAL: NAD, well-groomed  HEAD:  Atraumatic, normocephalic  DRAINS:   WOUND: Dressing clean dry intact  HERB COMA SCORE: E- V- M- =       E: 4= opens eyes spontaneously 3= to voice 2= to noxious 1= no opening       V: 5= oriented 4= confused 3= inappropriate words 2= incomprehensible sounds 1= nonverbal 1T= intubated       M: 6= follows commands 5= localizes 4= withdraws 3= flexor posturing 2= extensor posturing 1= no movement  MENTAL STATUS: AAO x3; Awake/Comatose; Opens eyes spontaneously/to voice/to light touch/to noxious stimuli; Appropriately conversant without aphasia/Nonverbal; following simple commands/mimicking/not following commands  CRANIAL NERVES: Visual acuity normal for age, visual fields full to confrontation, PERRL. EOMI without nystagmus. Facial sensation intact V1-3 distribution b/l. Face symmetric w/ normal eye closure and smile, tongue midline. Hearing grossly intact. Speech clear. Head turning and shoulder shrug intact.   REFLEXES: PERRL. Corneals intact b/l. Gag intact. Cough intact. Oculocephalic reflex intact (Doll's eye). Negative Nelson's b/l. Negative clonus b/l  MOTOR: strength 5/5 b/l upper and lower extremities  Uppers     Delt (C5/6)     Bicep (C5/6)     Wrist Extend (C6)     Tricep (C7)     HG (C8/T1)  R                     5/5                 5/5                         5/5                           5/5                   5/5  L                      5/5                 5/5                         5/5                           5/5                   5/5  Lowers      HF(L1/L2)     KE (L3)     DF (L4)     EHL (L5)     PF (S1)      R                     5/5              5/5           5/5           5/5            5/5  L                     5/5               5/5          5/5            5/5            5/5  SENSATION: grossly intact to light touch all extremities  COORDINATION: Gait intact; rapid alternating movements intact; heel to shin intact; no upper extremity dysmetria  CHEST/LUNG: Clear to auscultation bilaterally; no rales, rhonchi, wheezing, or rubs  HEART: +S1/+S2; Regular rate and rhythm; no murmurs, rubs, or gallops  ABDOMEN: Soft, nontender, nondistended; bowel sounds present all four quadrants  EXTREMITIES:  2+ peripheral pulses, no clubbing, cyanosis, or edema  SKIN: Warm, dry; no rashes or lesions    LABS:                        11.2   15.31 )-----------( 236      ( 2023 06:25 )             34.8     06-11    139  |  104  |  17.9  ----------------------------<  111<H>  3.6   |  24.0  |  0.57    Ca    8.6      2023 06:25  Phos  2.9     -11  Mg     1.8         TPro  5.8<L>  /  Alb  2.9<L>  /  TBili  0.3  /  DBili  x   /  AST  10<L>  /  ALT  17  /  AlkPhos  80  06-10    PT/INR - ( 10 Juan 2023 22:19 )   PT: 17.0 sec;   INR: 1.45 ratio         PTT - ( 10 Juan 2023 22:19 )  PTT:36.6 sec  Urinalysis Basic - ( 2023 09:47 )    Color: Yellow / Appearance: Clear / S.005 / pH: x  Gluc: x / Ketone: Negative  / Bili: Negative / Urobili: Negative mg/dL   Blood: x / Protein: 15 / Nitrite: Negative   Leuk Esterase: Negative / RBC: 0-2 /HPF / WBC 0-2 /HPF   Sq Epi: x / Non Sq Epi: x / Bacteria: x        06-10 @ 07: @ 07:00  --------------------------------------------------------  IN: 0 mL / OUT: 300 mL / NET: -300 mL     @ 07: @ 14:26  --------------------------------------------------------  IN: 760 mL / OUT: 120 mL / NET: 640 mL        RADIOLOGY & ADDITIONAL TESTS: HPI:  74 y/o male with PMHx of DM, HTN, HLD, Afib on Eliquis, TBI s/p R SDH evac in 2021, traumatic SDH 10/22 presented to [facility] with AMS. As per wife, patient was more lethargic and altered from his baseline, not responsive to questions. At baseline patient is minimally responsive, able to respond simple questions, contracted in RUE and withdraws in b/l LE. Last known well yesterday afternoon. Upon arrival to [place] ED, CTH revealed 6mm posterior falx SDH. Patient was also noted to have leukocytosis 20.97 and elevated lactate 3.3. Given 1g Vancomycin, 1g Cefepime, 2L NS bolus, CXR done, blood cultures sent, UA not sent. Eliquis reversed with KCentra and patient transferred to [facility] for further neurosurgical evaluation.   (2023 03:14)      INTERVAL HPI/OVERNIGHT EVENTS:  6/10: Transferred from [place].   : repeat CTH done.     Vital Signs Last 24 Hrs  T(C): 36.7 (2023 11:52), Max: 37.2 (2023 00:24)  T(F): 98.1 (2023 11:52), Max: 99 (2023 00:24)  HR: 94 (2023 14:00) (81 - 101)  BP: 139/92 (2023 14:00) (108/82 - 151/82)  BP(mean): 108 (2023 14:00) (89 - 113)  RR: 14 (2023 14:00) (11 - 18)  SpO2: 98% (2023 14:00) (96% - 100%)    Parameters below as of 2023 13:00  Patient On (Oxygen Delivery Method): room air        PHYSICAL EXAM:  GENERAL: NAD, well-groomed  HEAD:  Atraumatic, normocephalic  DRAINS:   WOUND: Dressing clean dry intact  HERB COMA SCORE: E-4 V-5 M-6       E: 4= opens eyes spontaneously 3= to voice 2= to noxious 1= no opening       V: 5= oriented 4= confused 3= inappropriate words 2= incomprehensible sounds 1= nonverbal 1T= intubated       M: 6= follows commands 5= localizes 4= withdraws 3= flexor posturing 2= extensor posturing 1= no movement  MENTAL STATUS: AAO x2(name and place) ; Awake; Opens eyes spontaneouslyAppropriately conversant without aphasia following simple commands  CRANIAL NERVES: Visual acuity normal for age, visual fields full to confrontation, PERRL. EOMI without nystagmus. Facial sensation intact V1-3 distribution b/l. Face symmetric w/ normal eye closure and smile, tongue midline. Hearing grossly intact. Speech clear. Head turning and shoulder shrug intact.   REFLEXES: PERRL. Corneals intact b/l. Gag intact. Cough intact. Oculocephalic reflex intact (Doll's eye). Negative Nelson's b/l. Negative clonus b/l  MOTOR: RUE contracted, LUE HG 4/5 otherwise bicep/tricep 1/5. B/l LE WD.   COORDINATION: Gait intact; rapid alternating movements intact; heel to shin intact; no upper extremity dysmetria  CHEST/LUNG:normal chest rise and expansion.   HEART: +S1/+S2;  ABDOMEN: Soft, nontender, nondistended;  EXTREMITIES: no clubbing, cyanosis, or edema  SKIN: macular rash identified in UE and LE extremities. stage 3 sacral ulcer. Skin tear identified in B/l Shins.     LABS:                        11.2   15.31 )-----------( 236      ( 2023 06:25 )             34.8     06-11    139  |  104  |  17.9  ----------------------------<  111<H>  3.6   |  24.0  |  0.57    Ca    8.6      2023 06:25  Phos  2.9     06-11  Mg     1.8     06-11    TPro  5.8<L>  /  Alb  2.9<L>  /  TBili  0.3  /  DBili  x   /  AST  10<L>  /  ALT  17  /  AlkPhos  80  06-10    PT/INR - ( 10 Juan 2023 22:19 )   PT: 17.0 sec;   INR: 1.45 ratio         PTT - ( 10 Juan 2023 22:19 )  PTT:36.6 sec  Urinalysis Basic - ( 2023 09:47 )    Color: Yellow / Appearance: Clear / S.005 / pH: x  Gluc: x / Ketone: Negative  / Bili: Negative / Urobili: Negative mg/dL   Blood: x / Protein: 15 / Nitrite: Negative   Leuk Esterase: Negative / RBC: 0-2 /HPF / WBC 0-2 /HPF   Sq Epi: x / Non Sq Epi: x / Bacteria: x      06-10 @ 07:01  -   @ 07:00  --------------------------------------------------------  IN: 0 mL / OUT: 300 mL / NET: -300 mL     @ 07:  -   @ 14:26  --------------------------------------------------------  IN: 760 mL / OUT: 120 mL / NET: 640 mL    RADIOLOGY & ADDITIONAL TESTS:    < from: CT Head No Cont (23 @ 05:01) >      IMPRESSION: Stable posterior left parafalcine subdural hemorrhage.   Ventriculomegaly suggesting communicating hydrocephalus. Correlate   clinically.< from: CT Head No Cont (06.10.23 @ 21:43) >      Critical findings above were relayed directly by telephone by the ED   radiologist on call, Myles Sandhu MD, to the emergency department   attending physician, Dr. Yepez, at 10:37 PM on 6/10/2023.    --- End of Report ---      < end of copied text >    < from: US Duplex Venous Lower Ext Complete, Bilateral (23 @ 19:17) >    IMPRESSION:    Persistent thrombosis of the left mid/distal femoral vein and popliteal   vein.    --- End of Rep    < end of copied text >    Assessment:  74 y/o male with PMHx of DM, HTN, HLD, Afib on Eliquis, TBI s/p R SDH evac in 2021, traumatic SDH 10/22 presented to [facility] with AMS. As per wife, patient was more lethargic and altered from his baseline, not responsive to questions. At baseline patient is minimally responsive, able to respond simple questions, contracted in RUE and withdraws in b/l LE. Last known well yesterday afternoon. Upon arrival to [place] ED, CTH revealed 6mm posterior falx SDH. Patient was also noted to have leukocytosis 20.97 and elevated lactate 3.3. Given 1g Vancomycin, 1g Cefepime, 2L NS bolus, CXR done, blood cultures sent, UA not sent. Eliquis reversed with KCentra and patient transferred to [facility] for further neurosurgical evaluation.    Plan:    neuro:   - q4 neurochecks  -Subdural Hematoma  no neurosurgical intervention at thistime.   - NSG jaquan continue to follow  f/u CTA head  continue home gabapentin mirtazipine, modafinil    Cardio:     -PRN: labetolol/Hydralazine  - atrial fibrillation: hold home eliquis  - Continue losartan, diltazem, rosuvastatin  Resp:   satting well on room air  GI:   :   NS @ 50    Heme:   SCD  hold eliquis for no  ID:   Acute Metabolic Encephalopathy 2/2 sepsis.   - Sepsis workup, On vancomycin 1g and cefepime. No source identified, Blood cultures pending from [facility]. UA negative.   lactate and leukocytosis is downtrending.   maintain normothermia.   Endo:   maintain BG. trend FS  ISS    Skin:  Wound team is following for dermatological conditions, stage 4. HPI:  72 y/o male with PMHx of DM, HTN, HLD, Afib on Eliquis, TBI s/p R SDH evac in 2021, traumatic SDH 10/22 presented to [facility] with AMS. As per wife, patient was more lethargic and altered from his baseline, not responsive to questions. At baseline patient is minimally responsive, able to respond simple questions, contracted in RUE and withdraws in b/l LE. Last known well yesterday afternoon. Upon arrival to [place] ED, CTH revealed 6mm posterior falx SDH. Patient was also noted to have leukocytosis 20.97 and elevated lactate 3.3. Given 1g Vancomycin, 1g Cefepime, 2L NS bolus, CXR done, blood cultures sent, UA not sent. Eliquis reversed with KCentra and patient transferred to [facility] for further neurosurgical evaluation.   (2023 03:14)      INTERVAL HPI/OVERNIGHT EVENTS:  6/10: Transferred from [place].   : repeat CTH done.     Vital Signs Last 24 Hrs  T(C): 36.7 (2023 11:52), Max: 37.2 (2023 00:24)  T(F): 98.1 (2023 11:52), Max: 99 (2023 00:24)  HR: 94 (2023 14:00) (81 - 101)  BP: 139/92 (2023 14:00) (108/82 - 151/82)  BP(mean): 108 (2023 14:00) (89 - 113)  RR: 14 (2023 14:00) (11 - 18)  SpO2: 98% (2023 14:00) (96% - 100%)    Parameters below as of 2023 13:00  Patient On (Oxygen Delivery Method): room air        PHYSICAL EXAM:  GENERAL: NAD, well-groomed  HEAD:  Atraumatic, normocephalic  DRAINS:   WOUND: Dressing clean dry intact  HERB COMA SCORE: E-4 V-5 M-6       E: 4= opens eyes spontaneously 3= to voice 2= to noxious 1= no opening       V: 5= oriented 4= confused 3= inappropriate words 2= incomprehensible sounds 1= nonverbal 1T= intubated       M: 6= follows commands 5= localizes 4= withdraws 3= flexor posturing 2= extensor posturing 1= no movement  MENTAL STATUS: AAO x2(name and place) ; Awake; Opens eyes spontaneouslyAppropriately conversant without aphasia following simple commands  CRANIAL NERVES: Visual acuity normal for age, visual fields full to confrontation, PERRL. EOMI without nystagmus. Facial sensation intact V1-3 distribution b/l. Face symmetric w/ normal eye closure and smile, tongue midline. Hearing grossly intact. Speech clear. Head turning and shoulder shrug intact.   REFLEXES: PERRL. Corneals intact b/l. Gag intact. Cough intact. Oculocephalic reflex intact (Doll's eye). Negative Nelson's b/l. Negative clonus b/l  MOTOR: RUE contracted, LUE HG 4/5 otherwise bicep/tricep 1/5. B/l LE WD.   COORDINATION: Gait intact; rapid alternating movements intact; heel to shin intact; no upper extremity dysmetria  CHEST/LUNG:normal chest rise and expansion.   HEART: +S1/+S2;  ABDOMEN: Soft, nontender, nondistended;  EXTREMITIES: no clubbing, cyanosis, or edema  SKIN: macular rash identified in UE and LE extremities. stage 3 sacral ulcer. Skin tear identified in B/l Shins.     LABS:                        11.2   15.31 )-----------( 236      ( 2023 06:25 )             34.8     06-11    139  |  104  |  17.9  ----------------------------<  111<H>  3.6   |  24.0  |  0.57    Ca    8.6      2023 06:25  Phos  2.9     06-11  Mg     1.8     06-11    TPro  5.8<L>  /  Alb  2.9<L>  /  TBili  0.3  /  DBili  x   /  AST  10<L>  /  ALT  17  /  AlkPhos  80  06-10    PT/INR - ( 10 Juan 2023 22:19 )   PT: 17.0 sec;   INR: 1.45 ratio         PTT - ( 10 Juan 2023 22:19 )  PTT:36.6 sec  Urinalysis Basic - ( 2023 09:47 )    Color: Yellow / Appearance: Clear / S.005 / pH: x  Gluc: x / Ketone: Negative  / Bili: Negative / Urobili: Negative mg/dL   Blood: x / Protein: 15 / Nitrite: Negative   Leuk Esterase: Negative / RBC: 0-2 /HPF / WBC 0-2 /HPF   Sq Epi: x / Non Sq Epi: x / Bacteria: x      06-10 @ 07:01  -   @ 07:00  --------------------------------------------------------  IN: 0 mL / OUT: 300 mL / NET: -300 mL     @ 07:  -   @ 14:26  --------------------------------------------------------  IN: 760 mL / OUT: 120 mL / NET: 640 mL    RADIOLOGY & ADDITIONAL TESTS:    < from: CT Head No Cont (23 @ 05:01) >      IMPRESSION: Stable posterior left parafalcine subdural hemorrhage.   Ventriculomegaly suggesting communicating hydrocephalus. Correlate   clinically.< from: CT Head No Cont (06.10.23 @ 21:43) >      Critical findings above were relayed directly by telephone by the ED   radiologist on call, Myles Sandhu MD, to the emergency department   attending physician, Dr. Yepez, at 10:37 PM on 6/10/2023.    --- End of Report ---      < end of copied text >    < from: US Duplex Venous Lower Ext Complete, Bilateral (23 @ 19:17) >    IMPRESSION:    Persistent thrombosis of the left mid/distal femoral vein and popliteal   vein.    --- End of Rep    < end of copied text >    Assessment:  72 y/o male with PMHx of DM, HTN, HLD, Afib on Eliquis, TBI s/p R SDH evac in 2021, traumatic SDH 10/22 presented to [facility] with AMS. As per wife, patient was more lethargic and altered from his baseline, not responsive to questions. At baseline patient is minimally responsive, able to respond simple questions, contracted in RUE and withdraws in b/l LE. Last known well yesterday afternoon. Upon arrival to [place] ED, CTH revealed 6mm posterior falx SDH. Patient was also noted to have leukocytosis 20.97 and elevated lactate 3.3. Given 1g Vancomycin, 1g Cefepime, 2L NS bolus, CXR done, blood cultures sent, UA not sent. Eliquis reversed with KCentra and patient transferred to [facility] for further neurosurgical evaluation.    Plan:    neuro:   - q4 neurochecks  -Subdural Hematoma  no neurosurgical intervention at thistime.   - NSG jaquan continue to follow  f/u CTA head  continue home gabapentin mirtazipine, modafinil    Cardio:     -PRN: labetolol/Hydralazine  - atrial fibrillation: hold home eliquis  - Continue losartan, diltazem, rosuvastatin  Resp:   satting well on room air  GI:   :   NS @ 50    Heme:   SCD  hold eliquis for no  ID:   Acute Metabolic Encephalopathy 2/2 sepsis.   - Sepsis workup, On vancomycin 1g and cefepime. No source identified, Blood cultures pending from [facility]. UA negative.   lactate and leukocytosis is downtrending.   maintain normothermia.   Endo:   maintain BG. trend FS  ISS    Skin:  Wound team is following for dermatological conditions, stage 4.  ------------------------------------------    ATTENDING ATTESTATION  72 y/o male with small acute interhemispheric SDH, spontaneous. Clinically and radiographically stable.   PMH of Afib and LLE DVT, on Eliquis at home; reversed on admission. H/o multiple spont. intracranial hemorrhagic events noted.  Also, h/o DM, HTN, HLD, remote TBI s/p R SDH evac in 2021.   Compromised baseline functional status - wheelchair, sacral decub ulcer stage 3, upper extr contraction.  Fever with neutrophilic leukocytosis, concern for infection but unclear source, pending BCx.    Plan: cont neurochecks, pain control  maintain -150; started on lower Diltiazem dose for now - 60 q8hrs for the next 24 hrs to avoid hypotension (concern for infection)  avoid therapeutic AC for 10 -14 days, NSGy involved; will need risk/benefit discussion eventually in view of VTE/Afib but also multiple spont. intracranial bleeds  wound care  cont empiric Abx  rest as above  Stable to be transferred to Telemetry; Medicine involvemetn appreciated.  GOC discussion - DNR status, trial of intubation.      Time spent - 40 min, non-critical, included review of relevant history, clinical examination, review of data and images, discussion of treatment with the multidisciplinary team and any consultants involved in this patient’s care as well as family discussion.

## 2023-06-11 NOTE — PATIENT PROFILE ADULT - FALL HARM RISK - HARM RISK INTERVENTIONS
Assistance with ambulation/Assistance OOB with selected safe patient handling equipment/Communicate Risk of Fall with Harm to all staff/Discuss with provider need for PT consult/Monitor gait and stability/Provide patient with walking aids - walker, cane, crutches/Reinforce activity limits and safety measures with patient and family/Review medications for side effects contributing to fall risk/Sit up slowly, dangle for a short time, stand at bedside before walking/Tailored Fall Risk Interventions/Toileting schedule using arm’s reach rule for commode and bathroom/Visual Cue: Yellow wristband and red socks/Bed in lowest position, wheels locked, appropriate side rails in place/Call bell, personal items and telephone in reach/Instruct patient to call for assistance before getting out of bed or chair/Non-slip footwear when patient is out of bed/[place] to call system/Physically safe environment - no spills, clutter or unnecessary equipment/Purposeful Proactive Rounding/Room/bathroom lighting operational, light cord in reach

## 2023-06-11 NOTE — ED PROVIDER NOTE - ATTENDING CONTRIBUTION TO CARE
72 yo M with hx TBI, A-fib on Eliquis transferred from [place] for neurosurgical evaluation for atraumatic subdural hematoma.  Pt minimally verbal at baseline, contracted  and unable to give any hx.  Pt seen and eval by Neurosurgery and accepted to Neuro ICU Propranolol Counseling:  I discussed with the patient the risks of propranolol including but not limited to low heart rate, low blood pressure, low blood sugar, restlessness and increased cold sensitivity. They should call the office if they experience any of these side effects.

## 2023-06-11 NOTE — CONSULT NOTE ADULT - NS ATTEND AMEND GEN_ALL_CORE FT
NSGY Attg:    see above    patient seen and examined    imaging reviewed  agree with exam and plan as above

## 2023-06-11 NOTE — ED ADULT NURSE NOTE - NSFALLHARMRISKINTERV_ED_ALL_ED
Assistance OOB with selected safe patient handling equipment if applicable/Assistance with ambulation/Communicate risk of Fall with Harm to all staff, patient, and family/Monitor gait and stability/Monitor for mental status changes and reorient to person, place, and time, as needed/Move patient closer to nursing station/within visual sight of ED staff/Provide visual cue: red socks, yellow wristband, yellow gown, etc/Reinforce activity limits and safety measures with patient and family/Toileting schedule using arm’s reach rule for commode and bathroom/Use of alarms - bed, stretcher, chair and/or video monitoring/Bed in lowest position, wheels locked, appropriate side rails in place/Call bell, personal items and telephone in reach/Instruct patient to call for assistance before getting out of bed/chair/stretcher/Non-slip footwear applied when patient is off stretcher/[place] to call system/Physically safe environment - no spills, clutter or unnecessary equipment/Purposeful Proactive Rounding/Room/bathroom lighting operational, light cord in reach

## 2023-06-11 NOTE — ED ADULT TRIAGE NOTE - CHIEF COMPLAINT QUOTE
BIBEMS as transfer from [place] for Neuro s/p wife noticing that he had changes in his mental status. PMH  of TBI, nonverbal at baseline, on Eliquis for Afib, given KCCENTRA at previous facility. Arrives with stage 2 on R scapula and stage 3 on sacrum, dressed by home care RN. CT showed 6mm acute posterior SDH.

## 2023-06-11 NOTE — PATIENT PROFILE ADULT - NSPROPOAURINARYCATHETER_GEN_A_NUR
Patient Specific Counseling (Will Not Stick From Patient To Patient): Discussed risk of diabetic foot ulcers. Recommended continuing TAC 0.1% oint PRN.
Detail Level: Zone
no

## 2023-06-11 NOTE — ED ADULT NURSE NOTE - ISOLATION TYPE:
Problem: Pressure Injury, Risk for  Goal: # Skin remains intact  Outcome: Outcome Met, Continue evaluating goal progress toward completion  Goal: No new pressure injury (PI) development  Outcome: Outcome Met, Continue evaluating goal progress toward completion     Problem: Activity Intolerance  Goal: # Functional status is maintained or returned to baseline  Outcome: Outcome Not Met, Continue to Monitor     Problem: Delirium, Risk for  Goal: # No symptoms of delirium  Description: Evaluate delirium symptoms under active problem when present  Outcome: Outcome Not Met, Continue to Monitor     Problem: Impaired Physical Mobility  Goal: # Bed mobility, ambulation, and ADLs are maintained or returned to baseline during hospitalization  Outcome: Outcome Not Met, Continue to Monitor     Problem: Pain  Goal: #Acceptable pain level achieved/maintained at rest using NRS/Faces  Description: This goal is used for patients who can self-report.  Acceptable means the level is at or below the identified comfort/function goal.  Outcome: Outcome Met, Continue evaluating goal progress toward completion     Problem: Dysphagia  Goal: # Exhibits no s/s of aspiration  Description: Symptoms of aspiration include coughing, choking, throat clearing, change in voice quality, and/or a decrease in oxygen saturation by more than 2% points from baseline after swallowing.  Outcome: Outcome Met, Continue evaluating goal progress toward completion     Problem: Neurovascular Surgery/Procedure  Goal: Neurological status is maintained/retuned to baseline  Description: Monitor for changes in neurological status including deficits in movement/strength/tone or sensation in the head/face/neck (e.g. facial asymmetry/droop and tongue deviation to weak side), speech or swallow deficits including loss of gag reflex, change in vocal quality or inability to handle oral  secretions (even if NPO status)  Outcome: Outcome Met, Continue evaluating goal progress  toward completion  Goal: Vital signs are maintained within parameters  Description: Monitor for changes in vital signs including hypertension or symptoms associated with vagal nerve stimulation (e.g. hypotension or bradycardia).  Outcome: Outcome Not Met, Continue to Monitor  Goal: Elimination status is maintained/returned to baseline  Outcome: Outcome Met, Continue evaluating goal progress toward completion  Goal: Oral intake is resumed and tolerated  Outcome: Outcome Not Met, Continue to Monitor  Goal: Activity level is resumed to level needed for d/c  Outcome: Outcome Not Met, Continue to Monitor     Problem: At Risk for Falls  Goal: # Patient does not fall  Outcome: Outcome Met, Continue evaluating goal progress toward completion  Goal: # Takes action to control fall-related risks  Outcome: Outcome Not Met, Continue to Monitor     Problem: At Risk for Injury Due to Fall  Goal: # Patient does not fall  Outcome: Outcome Met, Continue evaluating goal progress toward completion     Problem: VTE, Risk for  Goal: # No s/s of VTE  Outcome: Outcome Met, Continue evaluating goal progress toward completion      None

## 2023-06-11 NOTE — PROGRESS NOTE ADULT - ASSESSMENT
73M with PMHx of DM, HTN, HLD, Afib on Eliquis, TBI s/p R SDH evac in 9/2021, traumatic SDH 10/22 initially presented to [facility] with AMS found to have CTH revealed 6mm posterior falx SDH, noted to have leukocytosis 20.97 and elevated lactate 3.3. Given 1g Vancomycin, 1g Cefepime, 2L NS bolus, CXR done, blood cultures sent, UA not sent. Eliquis reversed with KCentra and patient transferred to [facility] for further neurosurgical evaluation. Now deemed stable for downgrade to Medicine for further care.     Acute Metabolic Encephalopathy in the setting of Sepsis   Unclear etiology  UA negative, CXR reviewed  BCXs pending  Cont antibiotics as lactate and leukocytosis downtrending  trend temperatures    Subdural Hematoma   Neurochecks q4h  NSGY consulted  F/u CTA Head   Hold Eliquis for Minimum 2 weeks per NSGY  cont home gabapentin, mirtazapine, modafinil    S2-3 Sacral Decubs   Not infectious appearing   Wound consult placed  frequent offloading     AF  HTN  HLD  cont home losartan, hydralazine, diltiazem, rosuvastatin   hold home Eliquis for now   	  Diabetes  ISS  Trend FS    Dispo: Transfer to medicine  Ultimately - wife is seeking [facility] placement

## 2023-06-11 NOTE — CONSULT NOTE ADULT - ASSESSMENT
ASSESSMENT:   73yM w/ PMHx of DM, HTN/HLD, Afib on Eliquis, TBI s/p R SDH evac in 9/2021, traumatic SDH 10/22 presented to [facility] with AMS. Wife reports pt was more lethargic and altered from his baseline. CTH at [facility] showed 6mm posterior falx SDH. Pt was also noted to have leukocytosis and elevated lactate. Transferred to [facility] for further neurosurgical eval.    PLAN:    -Admit to NeuroICU  -Q1h neuro checks  -Repeat CTH now  -CTA H/N  -SBP<160  -Pain control prn  -Recommend ongoing infectious workup to include blood cultures, UA  -Recommend derm eval given extensive rash  -VTE prophylaxis: SCDs, hold chemoprophylaxis due to: acute SDH  -Further medical management per NeuroICU team  -Will further discuss case with Dr. Edmondson

## 2023-06-11 NOTE — ED ADULT NURSE REASSESSMENT NOTE - NIH STROKE SCALE: 7. LIMB ATAXIA, QM
(UN) Amputation or joint fusion
(UN) Amputation or joint fusion
baseline/(UN) Amputation or joint fusion
(UN) Amputation or joint fusion

## 2023-06-11 NOTE — ED ADULT NURSE NOTE - NSFALLFACTORS_ED_ALL_ED
Altered elimination/Confusion/AMS/Disorientation/Impaired gait/IV and/or equipment tethered to patient/Weakness

## 2023-06-12 ENCOUNTER — NON-APPOINTMENT (OUTPATIENT)
Age: 73
End: 2023-06-12

## 2023-06-12 ENCOUNTER — TRANSCRIPTION ENCOUNTER (OUTPATIENT)
Age: 73
End: 2023-06-12

## 2023-06-12 LAB
A1C WITH ESTIMATED AVERAGE GLUCOSE RESULT: 5.5 % — SIGNIFICANT CHANGE UP (ref 4–5.6)
ANION GAP SERPL CALC-SCNC: 10 MMOL/L — SIGNIFICANT CHANGE UP (ref 5–17)
BASOPHILS # BLD AUTO: 0.06 K/UL — SIGNIFICANT CHANGE UP (ref 0–0.2)
BASOPHILS NFR BLD AUTO: 0.7 % — SIGNIFICANT CHANGE UP (ref 0–2)
BUN SERPL-MCNC: 11.5 MG/DL — SIGNIFICANT CHANGE UP (ref 8–20)
CALCIUM SERPL-MCNC: 8.5 MG/DL — SIGNIFICANT CHANGE UP (ref 8.4–10.5)
CHLORIDE SERPL-SCNC: 106 MMOL/L — SIGNIFICANT CHANGE UP (ref 96–108)
CO2 SERPL-SCNC: 24 MMOL/L — SIGNIFICANT CHANGE UP (ref 22–29)
CREAT SERPL-MCNC: 0.65 MG/DL — SIGNIFICANT CHANGE UP (ref 0.5–1.3)
EGFR: 101 ML/MIN/1.73M2 — SIGNIFICANT CHANGE UP
EOSINOPHIL # BLD AUTO: 0.36 K/UL — SIGNIFICANT CHANGE UP (ref 0–0.5)
EOSINOPHIL NFR BLD AUTO: 3.9 % — SIGNIFICANT CHANGE UP (ref 0–6)
ESTIMATED AVERAGE GLUCOSE: 111 MG/DL — SIGNIFICANT CHANGE UP (ref 68–114)
GLUCOSE BLDC GLUCOMTR-MCNC: 106 MG/DL — HIGH (ref 70–99)
GLUCOSE BLDC GLUCOMTR-MCNC: 108 MG/DL — HIGH (ref 70–99)
GLUCOSE BLDC GLUCOMTR-MCNC: 212 MG/DL — HIGH (ref 70–99)
GLUCOSE BLDC GLUCOMTR-MCNC: 91 MG/DL — SIGNIFICANT CHANGE UP (ref 70–99)
GLUCOSE SERPL-MCNC: 103 MG/DL — HIGH (ref 70–99)
HCT VFR BLD CALC: 34.9 % — LOW (ref 39–50)
HGB BLD-MCNC: 11.3 G/DL — LOW (ref 13–17)
IMM GRANULOCYTES NFR BLD AUTO: 0.7 % — SIGNIFICANT CHANGE UP (ref 0–0.9)
LYMPHOCYTES # BLD AUTO: 1.04 K/UL — SIGNIFICANT CHANGE UP (ref 1–3.3)
LYMPHOCYTES # BLD AUTO: 11.3 % — LOW (ref 13–44)
MAGNESIUM SERPL-MCNC: 1.9 MG/DL — SIGNIFICANT CHANGE UP (ref 1.6–2.6)
MCHC RBC-ENTMCNC: 25.2 PG — LOW (ref 27–34)
MCHC RBC-ENTMCNC: 32.4 GM/DL — SIGNIFICANT CHANGE UP (ref 32–36)
MCV RBC AUTO: 77.7 FL — LOW (ref 80–100)
MONOCYTES # BLD AUTO: 0.66 K/UL — SIGNIFICANT CHANGE UP (ref 0–0.9)
MONOCYTES NFR BLD AUTO: 7.2 % — SIGNIFICANT CHANGE UP (ref 2–14)
NEUTROPHILS # BLD AUTO: 7.05 K/UL — SIGNIFICANT CHANGE UP (ref 1.8–7.4)
NEUTROPHILS NFR BLD AUTO: 76.2 % — SIGNIFICANT CHANGE UP (ref 43–77)
PHOSPHATE SERPL-MCNC: 3.5 MG/DL — SIGNIFICANT CHANGE UP (ref 2.4–4.7)
PLATELET # BLD AUTO: 194 K/UL — SIGNIFICANT CHANGE UP (ref 150–400)
POTASSIUM SERPL-MCNC: 3.3 MMOL/L — LOW (ref 3.5–5.3)
POTASSIUM SERPL-SCNC: 3.3 MMOL/L — LOW (ref 3.5–5.3)
RBC # BLD: 4.49 M/UL — SIGNIFICANT CHANGE UP (ref 4.2–5.8)
RBC # FLD: 16.7 % — HIGH (ref 10.3–14.5)
SODIUM SERPL-SCNC: 140 MMOL/L — SIGNIFICANT CHANGE UP (ref 135–145)
WBC # BLD: 9.23 K/UL — SIGNIFICANT CHANGE UP (ref 3.8–10.5)
WBC # FLD AUTO: 9.23 K/UL — SIGNIFICANT CHANGE UP (ref 3.8–10.5)

## 2023-06-12 PROCEDURE — 70496 CT ANGIOGRAPHY HEAD: CPT | Mod: 26

## 2023-06-12 PROCEDURE — 71045 X-RAY EXAM CHEST 1 VIEW: CPT | Mod: 26

## 2023-06-12 PROCEDURE — 99233 SBSQ HOSP IP/OBS HIGH 50: CPT

## 2023-06-12 PROCEDURE — 70498 CT ANGIOGRAPHY NECK: CPT | Mod: 26

## 2023-06-12 RX ORDER — CEFEPIME 1 G/1
2000 INJECTION, POWDER, FOR SOLUTION INTRAMUSCULAR; INTRAVENOUS EVERY 8 HOURS
Refills: 0 | Status: DISCONTINUED | OUTPATIENT
Start: 2023-06-12 | End: 2023-06-14

## 2023-06-12 RX ORDER — CEFEPIME 1 G/1
2000 INJECTION, POWDER, FOR SOLUTION INTRAMUSCULAR; INTRAVENOUS EVERY 8 HOURS
Refills: 0 | Status: DISCONTINUED | OUTPATIENT
Start: 2023-06-12 | End: 2023-06-12

## 2023-06-12 RX ADMIN — CEFEPIME 1000 MILLIGRAM(S): 1 INJECTION, POWDER, FOR SOLUTION INTRAMUSCULAR; INTRAVENOUS at 14:33

## 2023-06-12 RX ADMIN — MUPIROCIN 1 APPLICATION(S): 20 OINTMENT TOPICAL at 17:25

## 2023-06-12 RX ADMIN — Medication 60 MILLIGRAM(S): at 06:53

## 2023-06-12 RX ADMIN — Medication 250 MILLIGRAM(S): at 12:27

## 2023-06-12 RX ADMIN — SODIUM CHLORIDE 60 MILLILITER(S): 9 INJECTION INTRAMUSCULAR; INTRAVENOUS; SUBCUTANEOUS at 14:56

## 2023-06-12 RX ADMIN — GABAPENTIN 400 MILLIGRAM(S): 400 CAPSULE ORAL at 06:55

## 2023-06-12 RX ADMIN — CEFEPIME 1000 MILLIGRAM(S): 1 INJECTION, POWDER, FOR SOLUTION INTRAMUSCULAR; INTRAVENOUS at 06:53

## 2023-06-12 RX ADMIN — LOSARTAN POTASSIUM 50 MILLIGRAM(S): 100 TABLET, FILM COATED ORAL at 17:26

## 2023-06-12 RX ADMIN — GABAPENTIN 400 MILLIGRAM(S): 400 CAPSULE ORAL at 17:26

## 2023-06-12 RX ADMIN — CHLORHEXIDINE GLUCONATE 1 APPLICATION(S): 213 SOLUTION TOPICAL at 06:53

## 2023-06-12 RX ADMIN — MUPIROCIN 1 APPLICATION(S): 20 OINTMENT TOPICAL at 06:54

## 2023-06-12 RX ADMIN — Medication 60 MILLIGRAM(S): at 14:31

## 2023-06-12 RX ADMIN — GABAPENTIN 400 MILLIGRAM(S): 400 CAPSULE ORAL at 23:49

## 2023-06-12 RX ADMIN — LOSARTAN POTASSIUM 50 MILLIGRAM(S): 100 TABLET, FILM COATED ORAL at 06:54

## 2023-06-12 RX ADMIN — Medication 60 MILLIGRAM(S): at 21:21

## 2023-06-12 RX ADMIN — ATORVASTATIN CALCIUM 40 MILLIGRAM(S): 80 TABLET, FILM COATED ORAL at 21:22

## 2023-06-12 RX ADMIN — GABAPENTIN 400 MILLIGRAM(S): 400 CAPSULE ORAL at 12:04

## 2023-06-12 RX ADMIN — MIRTAZAPINE 7.5 MILLIGRAM(S): 45 TABLET, ORALLY DISINTEGRATING ORAL at 21:21

## 2023-06-12 RX ADMIN — Medication 4: at 17:26

## 2023-06-12 RX ADMIN — CEFEPIME 2000 MILLIGRAM(S): 1 INJECTION, POWDER, FOR SOLUTION INTRAMUSCULAR; INTRAVENOUS at 21:16

## 2023-06-12 NOTE — ADVANCED PRACTICE NURSE CONSULT - RECOMMEDATIONS
·	Coccyx - Cleanse with normal saline, Pat dry, paint periwound with Cavilon liquid skin barrier, cover wound with Aquacel hydrofiber nonadherent dressing, abd pad & secure with Tegaderm daily OR Allevyn instead of ABD pad for profuse incontinence - date and initial dressing changes - (DO NOT PUT ALLEVYN DIRECTLY ONTO OPEN SKIN).    ·	Right Shoulder - Cleanse with normal saline, Pat dry, paint periwound with Cavilon liquid skin barrier, cover wound with Aquacel hydrofiber nonadherent dressing, abd pad & secure with Tegaderm daily OR Allevyn instead of ABD pad for profuse incontinence - date and initial dressing changes - (DO NOT PUT ALLEVYN DIRECTLY ONTO OPEN SKIN).    ·	Turn and Reposition Q2-4H  ·	Offload sacral-coccygeal area with positioner pillow, alternate sides Q2-4H  ·	Incontinence care with repositioning Q2-4H  ·	Apply Heel-Offloading cAIRboots or vertical pillows under each leg at all times while in bed. - provide skin checks and foot placement Q8H    -Continue turning/positioning patient from side-to-side q2h while in bed, q1h when/if OOB chair, or in accordance w/ pt's plan of care. Utilize pillows and/or Spry positioner pillow to assist w/ turning/positioning. When/if OOB chair, utilize pillows or chair cushion to offload pressure.   -Continue to offload heels from bed surface with soft pillow under calfs or by applying offloading boots to BLEs.   -Continue applying Coloplast Nael Protect moisture barrier cream to buttock and perineal area daily and prn after each incontinent episode.    -Continue utilizing one underpad underneath patient to contain incontinence episodes; change pad when saturated/soiled.   -Consider utilizing condom catheter (if patient candidate) to contain any urinary incontinence.   -Consider utilizing external fecal  (if patient candidate) or fecal management system/rectal tube/DigniShield (if patient candidate; MD order required) to contain loose fecal incontinence.   -Continue nutrition consult for optimal wound healing & nutritional status.   -Assess skin/wound qshift, report changes to primary provider.     Plan of Care: Primary RN made aware of above recs. Spoke w/ provider Dr Palencia in regards to above. No further needs/recs from WOCN service at this time. Staff RN to perform routine skin/wound assessment and manage wound care. Questions or concerns or if wound worsens and reconsult needed, please contact WOCN

## 2023-06-12 NOTE — ADVANCED PRACTICE NURSE CONSULT - ASSESSMENT
Patient laying supine in bed, turned to left (pillow under side), HOB elevated 30 degrees. Pt awake, A&Ox3, made aware of purpose of WOCN visit, agreeable to consult, max assist with care. Wife at bedside, stated patient coccyx wound was worse a month ago and wound care included silver alginate dressings PTA.  Patient wife noted growth to shoulder was similar to wound on left that was cauterized in the past.  Patient with limited mobility in bed. Documented history of fecal and urinary incontinence.  Ordered for soft and bite sized diet, current Chris score of 10. With assistance, patient turned patient to side for skin assessment. Skin assessment as below:    ·	Coccyx - Healed Prior Stage 3 or 4 Pressure Injury (3x2cm) - pink moist wound bed with epithelialization to edges with slight deficit to center, well-defined edges, scant serous drainage.  Periwound moist clean intact     ·	Right Posterior Shoulder - Raised Wound (2.5x1.5cm) - pink white moist circumscribed wound bed with minimal serous drainage, well-defined borders, periwound clean dry intact

## 2023-06-12 NOTE — PROGRESS NOTE ADULT - ASSESSMENT
73M with PMHx of DM, HTN, HLD, Afib on Eliquis, TBI s/p R SDH evac in 9/2021, traumatic SDH 10/22 initially presented to [facility] with AMS found to have CTH revealed 6mm posterior falx SDH, noted to have leukocytosis 20.97 and elevated lactate 3.3. Given 1g Vancomycin, 1g Cefepime, 2L NS bolus, CXR done, blood cultures sent, UA not sent. Eliquis reversed with KCentra and patient transferred to [facility] for further neurosurgical evaluation. Now deemed stable for downgrade to Medicine for further care.     #Acute Metabolic Encephalopathy in the setting of Sepsis   Unclear etiology  UA negative, CXR reviewed  BCXs NGTD  trend temperatures  given TBI, ?asp, c/w Cefepime x 5 days, low suspicion for MRSA infection  If worsening WBC, will check pan scan    #Subdural Hematoma   Neurochecks q4h  NSGY consulted, ernie recs  CTA Head with unchanged SDH  Hold Eliquis for Minimum 2 weeks per NSGY  cont home gabapentin, mirtazapine, modafinil    #S2-3 Sacral Decubs   Not infectious appearing   Wound consult placed  frequent offloading     #AF  #HTN  #HLD  cont home losartan, hydralazine, diltiazem, rosuvastatin   hold home Eliquis for now   	  #Diabetes  ISS  Trend FS    DVT ppx: SCD given SDH  Dispo: ultimately - [facility] placement

## 2023-06-12 NOTE — PROGRESS NOTE ADULT - SUBJECTIVE AND OBJECTIVE BOX
Pilar Palencia M.D.    Patient is a 73y old  Male who presents with a chief complaint of SDH (2023 14:25)      SUBJECTIVE / OVERNIGHT EVENTS: No event overnight. ROS unable to be obtained as pt not verbal this AM.     MEDICATIONS  (STANDING):  atorvastatin 40 milliGRAM(s) Oral at bedtime  cefepime  Injectable. 1000 milliGRAM(s) IV Push every 8 hours  chlorhexidine 2% Cloths 1 Application(s) Topical <User Schedule>  dextrose 50% Injectable 12.5 Gram(s) IV Push once  dextrose 50% Injectable 25 Gram(s) IV Push once  dextrose 50% Injectable 25 Gram(s) IV Push once  diltiazem    Tablet 60 milliGRAM(s) Oral every 8 hours  gabapentin 400 milliGRAM(s) Oral four times a day  insulin lispro (ADMELOG) corrective regimen sliding scale   SubCutaneous every 6 hours  losartan 50 milliGRAM(s) Oral two times a day  mirtazapine 7.5 milliGRAM(s) Oral at bedtime  mupirocin 2% Nasal 1 Application(s) Both Nostrils two times a day  sodium chloride 0.9%. 1000 milliLiter(s) (60 mL/Hr) IV Continuous <Continuous>    MEDICATIONS  (PRN):      I&O's Summary    2023 07:01  -  2023 07:00  --------------------------------------------------------  IN: 1660 mL / OUT: 1995 mL / NET: -335 mL        PHYSICAL EXAM:  Vital Signs Last 24 Hrs  T(C): 36.4 (2023 07:20), Max: 37.1 (2023 16:25)  T(F): 97.6 (2023 07:20), Max: 98.7 (2023 16:25)  HR: 87 (2023 12:00) (67 - 95)  BP: 152/97 (2023 12:00) (113/80 - 152/97)  BP(mean): 97 (2023 23:30) (88 - 108)  RR: 16 (2023 12:00) (13 - 16)  SpO2: 97% (2023 12:00) (96% - 99%)    Parameters below as of 2023 12:00  Patient On (Oxygen Delivery Method): room air    CONSTITUTIONAL: NAD, appears stated age, not verbal  ENMT: Moist oral mucosa, no pharyngeal injection or exudates; normal dentition  RESPIRATORY: Normal respiratory effort; clear to auscultation bilaterally  CARDIOVASCULAR: Regular rate and rhythm, no LE edema  ABDOMEN: Nontender to palpation  PSYCH: A+O to person, affect appropriate  SKIN: No rashes; no palpable lesions      LABS:                        11.3   9.23  )-----------( 194      ( 2023 05:51 )             34.9     06-12    140  |  106  |  11.5  ----------------------------<  103<H>  3.3<L>   |  24.0  |  0.65    Ca    8.5      2023 05:51  Phos  3.5     06-12  Mg     1.9     06-12    TPro  5.8<L>  /  Alb  2.9<L>  /  TBili  0.3  /  DBili  x   /  AST  10<L>  /  ALT  17  /  AlkPhos  80  06-10    PT/INR - ( 10 Juan 2023 22:19 )   PT: 17.0 sec;   INR: 1.45 ratio         PTT - ( 10 Juan 2023 22:19 )  PTT:36.6 sec  CARDIAC MARKERS ( 10 Juan 2023 22:19 )  x     / x     / 56 U/L / x     / x          Urinalysis Basic - ( 2023 09:47 )    Color: Yellow / Appearance: Clear / S.005 / pH: x  Gluc: x / Ketone: Negative  / Bili: Negative / Urobili: Negative mg/dL   Blood: x / Protein: 15 / Nitrite: Negative   Leuk Esterase: Negative / RBC: 0-2 /HPF / WBC 0-2 /HPF   Sq Epi: x / Non Sq Epi: x / Bacteria: x        Culture - Blood (collected 10 Juan 2023 22:19)  Source: .Blood Blood-Peripheral  Preliminary Report (2023 06:01):    No growth to date.    Culture - Blood (collected 10 Juan 2023 22:00)  Source: .Blood Blood-Peripheral  Preliminary Report (2023 06:01):    No growth to date.      CAPILLARY BLOOD GLUCOSE      POCT Blood Glucose.: 108 mg/dL (2023 11:56)  POCT Blood Glucose.: 91 mg/dL (2023 07:04)  POCT Blood Glucose.: 130 mg/dL (2023 23:26)  POCT Blood Glucose.: 194 mg/dL (2023 17:28)      RADIOLOGY & ADDITIONAL TESTS:  Results Reviewed:   Imaging Personally Reviewed:  Electrocardiogram Personally Reviewed:

## 2023-06-13 LAB
ANION GAP SERPL CALC-SCNC: 10 MMOL/L — SIGNIFICANT CHANGE UP (ref 5–17)
BASOPHILS # BLD AUTO: 0.08 K/UL — SIGNIFICANT CHANGE UP (ref 0–0.2)
BASOPHILS NFR BLD AUTO: 0.8 % — SIGNIFICANT CHANGE UP (ref 0–2)
BUN SERPL-MCNC: 11.1 MG/DL — SIGNIFICANT CHANGE UP (ref 8–20)
CALCIUM SERPL-MCNC: 8.5 MG/DL — SIGNIFICANT CHANGE UP (ref 8.4–10.5)
CHLORIDE SERPL-SCNC: 109 MMOL/L — HIGH (ref 96–108)
CO2 SERPL-SCNC: 24 MMOL/L — SIGNIFICANT CHANGE UP (ref 22–29)
CREAT SERPL-MCNC: 0.74 MG/DL — SIGNIFICANT CHANGE UP (ref 0.5–1.3)
EGFR: 97 ML/MIN/1.73M2 — SIGNIFICANT CHANGE UP
EOSINOPHIL # BLD AUTO: 0.55 K/UL — HIGH (ref 0–0.5)
EOSINOPHIL NFR BLD AUTO: 5.5 % — SIGNIFICANT CHANGE UP (ref 0–6)
GLUCOSE BLDC GLUCOMTR-MCNC: 106 MG/DL — HIGH (ref 70–99)
GLUCOSE BLDC GLUCOMTR-MCNC: 116 MG/DL — HIGH (ref 70–99)
GLUCOSE BLDC GLUCOMTR-MCNC: 122 MG/DL — HIGH (ref 70–99)
GLUCOSE BLDC GLUCOMTR-MCNC: 161 MG/DL — HIGH (ref 70–99)
GLUCOSE BLDC GLUCOMTR-MCNC: 218 MG/DL — HIGH (ref 70–99)
GLUCOSE SERPL-MCNC: 110 MG/DL — HIGH (ref 70–99)
HCT VFR BLD CALC: 35.6 % — LOW (ref 39–50)
HGB BLD-MCNC: 11.2 G/DL — LOW (ref 13–17)
IMM GRANULOCYTES NFR BLD AUTO: 0.5 % — SIGNIFICANT CHANGE UP (ref 0–0.9)
LYMPHOCYTES # BLD AUTO: 1.43 K/UL — SIGNIFICANT CHANGE UP (ref 1–3.3)
LYMPHOCYTES # BLD AUTO: 14.4 % — SIGNIFICANT CHANGE UP (ref 13–44)
MCHC RBC-ENTMCNC: 24.7 PG — LOW (ref 27–34)
MCHC RBC-ENTMCNC: 31.5 GM/DL — LOW (ref 32–36)
MCV RBC AUTO: 78.4 FL — LOW (ref 80–100)
MONOCYTES # BLD AUTO: 0.85 K/UL — SIGNIFICANT CHANGE UP (ref 0–0.9)
MONOCYTES NFR BLD AUTO: 8.5 % — SIGNIFICANT CHANGE UP (ref 2–14)
NEUTROPHILS # BLD AUTO: 7 K/UL — SIGNIFICANT CHANGE UP (ref 1.8–7.4)
NEUTROPHILS NFR BLD AUTO: 70.3 % — SIGNIFICANT CHANGE UP (ref 43–77)
PLATELET # BLD AUTO: 215 K/UL — SIGNIFICANT CHANGE UP (ref 150–400)
POTASSIUM SERPL-MCNC: 3.4 MMOL/L — LOW (ref 3.5–5.3)
POTASSIUM SERPL-SCNC: 3.4 MMOL/L — LOW (ref 3.5–5.3)
RBC # BLD: 4.54 M/UL — SIGNIFICANT CHANGE UP (ref 4.2–5.8)
RBC # FLD: 16.7 % — HIGH (ref 10.3–14.5)
SARS-COV-2 RNA SPEC QL NAA+PROBE: SIGNIFICANT CHANGE UP
SODIUM SERPL-SCNC: 143 MMOL/L — SIGNIFICANT CHANGE UP (ref 135–145)
WBC # BLD: 9.96 K/UL — SIGNIFICANT CHANGE UP (ref 3.8–10.5)
WBC # FLD AUTO: 9.96 K/UL — SIGNIFICANT CHANGE UP (ref 3.8–10.5)

## 2023-06-13 PROCEDURE — 99232 SBSQ HOSP IP/OBS MODERATE 35: CPT

## 2023-06-13 RX ORDER — POTASSIUM CHLORIDE 20 MEQ
40 PACKET (EA) ORAL ONCE
Refills: 0 | Status: COMPLETED | OUTPATIENT
Start: 2023-06-13 | End: 2023-06-13

## 2023-06-13 RX ORDER — POTASSIUM CHLORIDE 20 MEQ
10 PACKET (EA) ORAL
Refills: 0 | Status: COMPLETED | OUTPATIENT
Start: 2023-06-13 | End: 2023-06-13

## 2023-06-13 RX ORDER — INSULIN LISPRO 100/ML
VIAL (ML) SUBCUTANEOUS
Refills: 0 | Status: DISCONTINUED | OUTPATIENT
Start: 2023-06-13 | End: 2023-06-14

## 2023-06-13 RX ADMIN — Medication 100 MILLIEQUIVALENT(S): at 20:00

## 2023-06-13 RX ADMIN — MIRTAZAPINE 7.5 MILLIGRAM(S): 45 TABLET, ORALLY DISINTEGRATING ORAL at 21:27

## 2023-06-13 RX ADMIN — CEFEPIME 2000 MILLIGRAM(S): 1 INJECTION, POWDER, FOR SOLUTION INTRAMUSCULAR; INTRAVENOUS at 05:07

## 2023-06-13 RX ADMIN — MUPIROCIN 1 APPLICATION(S): 20 OINTMENT TOPICAL at 05:08

## 2023-06-13 RX ADMIN — CEFEPIME 2000 MILLIGRAM(S): 1 INJECTION, POWDER, FOR SOLUTION INTRAMUSCULAR; INTRAVENOUS at 14:17

## 2023-06-13 RX ADMIN — GABAPENTIN 400 MILLIGRAM(S): 400 CAPSULE ORAL at 18:49

## 2023-06-13 RX ADMIN — GABAPENTIN 400 MILLIGRAM(S): 400 CAPSULE ORAL at 23:09

## 2023-06-13 RX ADMIN — GABAPENTIN 400 MILLIGRAM(S): 400 CAPSULE ORAL at 12:13

## 2023-06-13 RX ADMIN — Medication 60 MILLIGRAM(S): at 05:09

## 2023-06-13 RX ADMIN — LOSARTAN POTASSIUM 50 MILLIGRAM(S): 100 TABLET, FILM COATED ORAL at 18:49

## 2023-06-13 RX ADMIN — LOSARTAN POTASSIUM 50 MILLIGRAM(S): 100 TABLET, FILM COATED ORAL at 05:09

## 2023-06-13 RX ADMIN — CEFEPIME 2000 MILLIGRAM(S): 1 INJECTION, POWDER, FOR SOLUTION INTRAMUSCULAR; INTRAVENOUS at 21:40

## 2023-06-13 RX ADMIN — Medication 2: at 21:43

## 2023-06-13 RX ADMIN — ATORVASTATIN CALCIUM 40 MILLIGRAM(S): 80 TABLET, FILM COATED ORAL at 21:27

## 2023-06-13 RX ADMIN — Medication 100 MILLIEQUIVALENT(S): at 21:26

## 2023-06-13 RX ADMIN — GABAPENTIN 400 MILLIGRAM(S): 400 CAPSULE ORAL at 05:09

## 2023-06-13 RX ADMIN — CHLORHEXIDINE GLUCONATE 1 APPLICATION(S): 213 SOLUTION TOPICAL at 05:09

## 2023-06-13 RX ADMIN — Medication 100 MILLIEQUIVALENT(S): at 18:50

## 2023-06-13 RX ADMIN — Medication 60 MILLIGRAM(S): at 21:26

## 2023-06-13 RX ADMIN — MUPIROCIN 1 APPLICATION(S): 20 OINTMENT TOPICAL at 18:49

## 2023-06-13 RX ADMIN — Medication 4: at 18:53

## 2023-06-13 NOTE — DIETITIAN INITIAL EVALUATION ADULT - ORAL INTAKE PTA/DIET HISTORY
Pt with history of TBI; unable to provide nutrition history.  Pt had swallow evaluation; diet advanced to soft and bite sized with thin liquids. NFPE conducted.

## 2023-06-13 NOTE — PROGRESS NOTE ADULT - NS ATTEND AMEND GEN_ALL_CORE FT
NSGY Attg:    see above    patient seen and examined    CTA negative    agree with exam and plan as above  hold Eliquis for now  primary team/cardiology to re-stratify risk versus benefit of AC

## 2023-06-13 NOTE — PROGRESS NOTE ADULT - ASSESSMENT
73M with PMHx of DM, HTN, HLD, Afib on Eliquis, TBI s/p R SDH evac in 9/2021, traumatic SDH 10/22 initially presented to [facility] with AMS found to have CTH revealed 6mm posterior falx SDH, noted to have leukocytosis 20.97 and elevated lactate 3.3. Given 1g Vancomycin, 1g Cefepime, 2L NS bolus, CXR done, blood cultures sent, UA not sent. Eliquis reversed with KCentra and patient transferred to [facility] for further neurosurgical evaluation. Now deemed stable for downgrade to Medicine for further care.     #Acute Metabolic Encephalopathy in the setting of Sepsis   Unclear etiology  UA negative, CXR reviewed  BCXs NGTD  trend temperatures  given TBI, ?asp, c/w Cefepime x 5 days, low suspicion for MRSA infection, can discharge with Cefpodoxime   If worsening WBC, will check pan scan    #Subdural Hematoma   Neurochecks q4h  NSGY consulted, ernie recs  CTA Head with unchanged SDH  Hold Eliquis for Minimum 2 weeks per NSGY  cont home gabapentin, mirtazapine, modafinil    #S2-3 Sacral Decubs   Not infectious appearing   Wound consult placed  frequent offloading     #AF  #HTN  #HLD  cont home losartan, hydralazine, diltiazem, rosuvastatin   hold home Eliquis for now   	  #Diabetes  ISS  Trend FS    DVT ppx: SCD given SDH  Dispo: ultimately - home with home care likely in AM 73M with PMHx of DM, HTN, HLD, Afib on Eliquis, TBI s/p R SDH evac in 9/2021, traumatic SDH 10/22 initially presented to [facility] with AMS found to have CTH revealed 6mm posterior falx SDH, noted to have leukocytosis 20.97 and elevated lactate 3.3. Given 1g Vancomycin, 1g Cefepime, 2L NS bolus, CXR done, blood cultures sent, UA not sent. Eliquis reversed with KCentra and patient transferred to [facility] for further neurosurgical evaluation. Now deemed stable for downgrade to Medicine for further care.     #Acute Metabolic Encephalopathy in the setting of Sepsis   Unclear etiology  UA negative, CXR reviewed  BCXs NGTD  trend temperatures  given TBI, ?asp, c/w Cefepime x 5 days, low suspicion for MRSA infection, can discharge with Cefpodoxime   If worsening WBC, will check pan scan    #Subdural Hematoma   Neurochecks q4h  NSGY consulted, ernie recs  CTA Head with unchanged SDH  Hold Eliquis for 2 weeks per NSGY, okay to resume on 6/25 - d/w neurosx  cont home gabapentin, mirtazapine, modafinil    #S2-3 Sacral Decubs   Not infectious appearing   Wound consult placed  frequent offloading     #AF  #HTN  #HLD  cont home losartan, hydralazine, diltiazem, rosuvastatin   hold home Eliquis for now   	  #Diabetes  ISS  Trend FS    DVT ppx: SCD given SDH  Dispo: ultimately - home with home care likely in AM

## 2023-06-13 NOTE — PROGRESS NOTE ADULT - NUTRITIONAL ASSESSMENT
This patient has been assessed with a concern for Malnutrition and has been determined to have a diagnosis/diagnoses of Severe protein-calorie malnutrition and Underweight (BMI < 19).    This patient is being managed with:   Diet Soft and Bite Sized-  Entered: Jun 11 2023  2:17PM

## 2023-06-13 NOTE — PHYSICAL THERAPY INITIAL EVALUATION ADULT - LEVEL OF INDEPENDENCE: SUPINE/SIT, REHAB EVAL
unable to complete due to facial expression and voicing for pain/dependent (less than 25% patients effort)

## 2023-06-13 NOTE — PHYSICAL THERAPY INITIAL EVALUATION ADULT - PRECAUTIONS/LIMITATIONS, REHAB EVAL
skin integrity ((+) multiple wounds/refer to RN note)/aspiration precautions/cardiac precautions/fall precautions/swallowing precautions

## 2023-06-13 NOTE — DIETITIAN INITIAL EVALUATION ADULT - PERTINENT MEDS FT
MEDICATIONS  (STANDING):  atorvastatin 40 milliGRAM(s) Oral at bedtime  cefepime  Injectable. 2000 milliGRAM(s) IV Push every 8 hours  chlorhexidine 2% Cloths 1 Application(s) Topical <User Schedule>  dextrose 50% Injectable 12.5 Gram(s) IV Push once  dextrose 50% Injectable 25 Gram(s) IV Push once  dextrose 50% Injectable 25 Gram(s) IV Push once  diltiazem    Tablet 60 milliGRAM(s) Oral every 8 hours  gabapentin 400 milliGRAM(s) Oral four times a day  insulin lispro (ADMELOG) corrective regimen sliding scale   SubCutaneous every 6 hours  losartan 50 milliGRAM(s) Oral two times a day  mirtazapine 7.5 milliGRAM(s) Oral at bedtime  mupirocin 2% Nasal 1 Application(s) Both Nostrils two times a day  potassium chloride   Powder 40 milliEquivalent(s) Oral once    MEDICATIONS  (PRN):

## 2023-06-13 NOTE — DIETITIAN INITIAL EVALUATION ADULT - PERTINENT LABORATORY DATA
06-13    143  |  109<H>  |  11.1  ----------------------------<  110<H>  3.4<L>   |  24.0  |  0.74    Ca    8.5      13 Jun 2023 05:34  Phos  3.5     06-12  Mg     1.9     06-12    POCT Blood Glucose.: 106 mg/dL (06-13-23 @ 09:09)  A1C with Estimated Average Glucose Result: 5.5 % (06-12-23 @ 05:51)  A1C with Estimated Average Glucose Result: 9.3 % (11-23-22 @ 05:29)  A1C with Estimated Average Glucose Result: 7.8 % (10-20-22 @ 02:22)

## 2023-06-13 NOTE — PROGRESS NOTE ADULT - ASSESSMENT
73yM w/ PMHx of DM, HTN/HLD, Afib on Eliquis, TBI s/p R SDH evac in 9/2021, traumatic SDH 10/22 presented to [facility] with AMS. Wife reports pt was more lethargic and altered from his baseline. CTH at [facility] showed 6mm posterior falx SDH. Pt was also noted to have leukocytosis and elevated lactate. Transferred to [facility] for further neurosurgical eval.  h/o DVT   Eliquis on hold due to ICH      PLAN:  -frequent neuro checks or as per primary team   -Repeat CTH if clinical exam changes/ deteriorates and notify NSX   -Pain control prn  -VTE prophylaxis: SCDs, ok to start DVT chemoprophylaxis  - cont to hold Eliquis x 2 weeks from injury ( 6/25/22) or stratify w cario if needed in the future vs a different agent)  -Further medical management per primary team   -reconsult as needed and f/u w primary Nsx or Dr. Edmondson OP   case and plan d/w Dr. Palencia verbally

## 2023-06-13 NOTE — DIETITIAN INITIAL EVALUATION ADULT - OTHER INFO
Pt is a 74 y/o male with PMHx of DM, HTN, HLD, Afib on Eliquis, TBI s/p R SDH evac in 9/2021, traumatic SDH 10/22 presented to [facility] with AMS. As per wife, patient was more lethargic and altered from his baseline, not responsive to questions. At baseline patient is minimally responsive, able to respond simple questions, contracted in RUE and withdraws in b/l LE. Last known well yesterday afternoon. Upon arrival to [place] ED, CTH revealed 6mm posterior falx SDH. Patient was also noted to have leukocytosis 20.97 and elevated lactate 3.3. Given 1g Vancomycin, 1g Cefepime, 2L NS bolus, CXR done, blood cultures sent, UA not sent. Eliquis reversed with KCentra and patient transferred to [facility] for further neurosurgical evaluation.  Pt admitted with Acute Metabolic Encephalopathy in the setting of Sepsis

## 2023-06-13 NOTE — PHYSICAL THERAPY INITIAL EVALUATION ADULT - PASSIVE RANGE OF MOTION EXAMINATION, REHAB EVAL
limited passive ROM in Ricky UE/LE due to increased muscle tone and  right knee flexion contraction, left in knee extension, ricky elbow flexion contraction, no passive movements in Ricky ankle joints, limited passive movement in Ricky hip flexion

## 2023-06-13 NOTE — PROGRESS NOTE ADULT - SUBJECTIVE AND OBJECTIVE BOX
NEUROSURGERY PROGRESS NOTE:    73yM w/ PMHx of DM, HTN/HLD, Afib on Eliquis, & h/o DVT, TBI s/p R SDH evac in 9/2021, traumatic SDH 10/22 presented to [facility] with AMS. Wife reports pt was more lethargic and altered from his baseline. Reports he was not responsive to questions like he normally is this afternoon. Reports he was able to eat but when she put him to bed, he felt hot so she called EMS. CTH at [facility] showed 6mm posterior falx SDH. Pt was also noted to have leukocytosis and elevated lactate. Transferred to [facility] for further neurosurgical eval. At baseline, pt is AOx2, and responsive to simple questions. Does not move b/l LEs at baseline and RUE is contracted but does have spontaneous movement in LUE with some control. On exam today, pt seems to be at his baseline. Wife reports this is improvement from how he was acting earlier.     6/13 pt seen and at baseline/ awake and following simple commands   Eliquis on hold for SDH since injury on 6/11      MEDICATIONS  (STANDING):  atorvastatin 40 milliGRAM(s) Oral at bedtime  cefepime  Injectable. 2000 milliGRAM(s) IV Push every 8 hours  chlorhexidine 2% Cloths 1 Application(s) Topical <User Schedule>  dextrose 50% Injectable 25 Gram(s) IV Push once  dextrose 50% Injectable 12.5 Gram(s) IV Push once  dextrose 50% Injectable 25 Gram(s) IV Push once  diltiazem    Tablet 60 milliGRAM(s) Oral every 8 hours  gabapentin 400 milliGRAM(s) Oral four times a day  insulin lispro (ADMELOG) corrective regimen sliding scale   SubCutaneous every 6 hours  losartan 50 milliGRAM(s) Oral two times a day  mirtazapine 7.5 milliGRAM(s) Oral at bedtime  mupirocin 2% Nasal 1 Application(s) Both Nostrils two times a day  potassium chloride  10 mEq/100 mL IVPB 10 milliEquivalent(s) IV Intermittent every 1 hour    MEDICATIONS  (PRN):    Allergies    No Known Allergies    Intolerances      Vital Signs Last 24 Hrs  T(C): 36.3 (13 Jun 2023 16:00), Max: 36.9 (12 Jun 2023 20:00)  T(F): 97.3 (13 Jun 2023 16:00), Max: 98.4 (12 Jun 2023 20:00)  HR: 89 (13 Jun 2023 16:00) (71 - 89)  BP: 134/107 (13 Jun 2023 16:00) (134/107 - 147/103)  BP(mean): 114 (13 Jun 2023 00:25) (104 - 114)  RR: 17 (13 Jun 2023 16:00) (16 - 17)  SpO2: 99% (13 Jun 2023 16:00) (96% - 99%)    Parameters below as of 13 Jun 2023 16:00  Patient On (Oxygen Delivery Method): room air            PHYSICAL EXAM:  GENERAL: NAD in bed   HEAD: normocephalic  EYES: Conjunctiva and sclera clear  HERB COMA SCORE: E- V- M- = 15       E: 4= opens eyes spontaneously 3= to voice 2= to noxious 1= no opening       V: 5= oriented 4= confused 3= inappropriate words 2= incomprehensible sounds 1= nonverbal 1T= intubated       M: 6= follows commands 5= localizes 4= withdraws 3= flexor posturing 2= extensor posturing 1= no movement  MENTAL STATUS: AAO x2; Awake; Opens eyes spontaneously; Answers simple questions; following simple commands  CRANIAL NERVES: L gaze preference but able to cross midline; PERRL. Face symmetric w/ normal eye closure and smile, tongue midline.  REFLEXES: PERRL.   MOTOR: RUE contracted; Able to  hands b/l L>R; b/l LEs withdraw  SENSATION: Difficult to assess but intact to noxious in all extremities  COORDINATION: Gait testing deferred  SKIN: Significant erythematous rash throughout body to include all extremities and trunk; TNTC plaques throughout b/l UE and LEs          LABS:                        11.2   9.96  )-----------( 215      ( 13 Jun 2023 05:34 )             35.6     06-13    143  |  109<H>  |  11.1  ----------------------------<  110<H>  3.4<L>   |  24.0  |  0.74    Ca    8.5      13 Jun 2023 05:34  Phos  3.5     06-12  Mg     1.9     06-12          RADIOLOGY & ADDITIONAL TESTS:  no new NSx images available for review     < from: CT Angio Neck w/ IV Cont (06.12.23 @ 01:58) >  IMPRESSION:    1. Again identified is a falx subdural hemorrhage unchanged in appearance. Extensive encephalomalacia and gliosis again noted in the frontal and temporal lobes, likely from old trauma, in addition to   chronic ischemic changes. Ventricles again diffusely enlarged. No new abnormality since prior CT.  2. Scattered atherosclerotic changes in the head and neck, but the vasculature is widely patent. No significant stenosis, dissection, or occlusion identified.    Follow-up MR imaging of the brain may be considered for further assessment.  --- End of Report ---  < end of copied text >      I spent a total time of 15 mins with the patient at bedside of which more than 50% of time was spent on counseling/coordination of care

## 2023-06-13 NOTE — DIETITIAN INITIAL EVALUATION ADULT - ETIOLOGY
Related to inability to meet sufficient protein energy needs in setting of hx of TBI, wound healing, acute Metabolic Encephalopathy in the setting of Sepsis

## 2023-06-13 NOTE — PHYSICAL THERAPY INITIAL EVALUATION ADULT - ADDITIONAL COMMENTS
Pt not forward with information about social support upon D/C home and previous functional status, VANNESA J made aware and will follow up with support system.

## 2023-06-13 NOTE — PROGRESS NOTE ADULT - SUBJECTIVE AND OBJECTIVE BOX
Pilar Palencia M.D.    Patient is a 73y old  Male who presents with a chief complaint of Traumatic subdural hemorrhage with loss of consciousness status unknown, initial encounter     (13 Jun 2023 09:52)      SUBJECTIVE / OVERNIGHT EVENTS: No event overnight.     Patient denies chest pain, SOB, abd pain, N/V, fever, chills, dysuria or any other complaints. All remainder ROS negative.     MEDICATIONS  (STANDING):  atorvastatin 40 milliGRAM(s) Oral at bedtime  cefepime  Injectable. 2000 milliGRAM(s) IV Push every 8 hours  chlorhexidine 2% Cloths 1 Application(s) Topical <User Schedule>  dextrose 50% Injectable 12.5 Gram(s) IV Push once  dextrose 50% Injectable 25 Gram(s) IV Push once  dextrose 50% Injectable 25 Gram(s) IV Push once  diltiazem    Tablet 60 milliGRAM(s) Oral every 8 hours  gabapentin 400 milliGRAM(s) Oral four times a day  insulin lispro (ADMELOG) corrective regimen sliding scale   SubCutaneous every 6 hours  losartan 50 milliGRAM(s) Oral two times a day  mirtazapine 7.5 milliGRAM(s) Oral at bedtime  mupirocin 2% Nasal 1 Application(s) Both Nostrils two times a day    MEDICATIONS  (PRN):      I&O's Summary    12 Jun 2023 07:01  -  13 Jun 2023 07:00  --------------------------------------------------------  IN: 810 mL / OUT: 1650 mL / NET: -840 mL        PHYSICAL EXAM:  Vital Signs Last 24 Hrs  T(C): 36.4 (13 Jun 2023 07:05), Max: 36.9 (12 Jun 2023 15:45)  T(F): 97.5 (13 Jun 2023 07:05), Max: 98.4 (12 Jun 2023 15:45)  HR: 82 (13 Jun 2023 04:36) (71 - 83)  BP: 139/96 (13 Jun 2023 04:36) (139/96 - 156/91)  BP(mean): 114 (13 Jun 2023 00:25) (104 - 114)  RR: 17 (13 Jun 2023 04:36) (16 - 17)  SpO2: 97% (13 Jun 2023 04:36) (96% - 97%)    Parameters below as of 13 Jun 2023 04:36  Patient On (Oxygen Delivery Method): room air    CONSTITUTIONAL: NAD, appears stated age, not verbal  ENMT: Moist oral mucosa, no pharyngeal injection or exudates; normal dentition  RESPIRATORY: Normal respiratory effort; clear to auscultation bilaterally  CARDIOVASCULAR: Regular rate and rhythm, no LE edema  ABDOMEN: Nontender to palpation  PSYCH: A+O to person, affect appropriate  SKIN: No rashes; no palpable lesions    LABS:                        11.2   9.96  )-----------( 215      ( 13 Jun 2023 05:34 )             35.6     06-13    143  |  109<H>  |  11.1  ----------------------------<  110<H>  3.4<L>   |  24.0  |  0.74    Ca    8.5      13 Jun 2023 05:34  Phos  3.5     06-12  Mg     1.9     06-12      Culture - Blood (collected 10 Juan 2023 22:19)  Source: .Blood Blood-Peripheral  Preliminary Report (12 Jun 2023 06:01):    No growth to date.    Culture - Blood (collected 10 Juan 2023 22:00)  Source: .Blood Blood-Peripheral  Preliminary Report (12 Jun 2023 06:01):    No growth to date.      CAPILLARY BLOOD GLUCOSE      POCT Blood Glucose.: 116 mg/dL (13 Jun 2023 12:12)  POCT Blood Glucose.: 106 mg/dL (13 Jun 2023 09:09)  POCT Blood Glucose.: 122 mg/dL (13 Jun 2023 05:06)  POCT Blood Glucose.: 106 mg/dL (12 Jun 2023 23:52)  POCT Blood Glucose.: 212 mg/dL (12 Jun 2023 17:23)      RADIOLOGY & ADDITIONAL TESTS:  Results Reviewed:   Imaging Personally Reviewed:  Electrocardiogram Personally Reviewed:

## 2023-06-14 ENCOUNTER — TRANSCRIPTION ENCOUNTER (OUTPATIENT)
Age: 73
End: 2023-06-14

## 2023-06-14 VITALS — WEIGHT: 120.37 LBS | HEIGHT: 67.99 IN

## 2023-06-14 LAB
ANION GAP SERPL CALC-SCNC: 10 MMOL/L — SIGNIFICANT CHANGE UP (ref 5–17)
BUN SERPL-MCNC: 13.6 MG/DL — SIGNIFICANT CHANGE UP (ref 8–20)
CALCIUM SERPL-MCNC: 8.4 MG/DL — SIGNIFICANT CHANGE UP (ref 8.4–10.5)
CHLORIDE SERPL-SCNC: 106 MMOL/L — SIGNIFICANT CHANGE UP (ref 96–108)
CO2 SERPL-SCNC: 25 MMOL/L — SIGNIFICANT CHANGE UP (ref 22–29)
CREAT SERPL-MCNC: 0.79 MG/DL — SIGNIFICANT CHANGE UP (ref 0.5–1.3)
EGFR: 95 ML/MIN/1.73M2 — SIGNIFICANT CHANGE UP
GLUCOSE BLDC GLUCOMTR-MCNC: 119 MG/DL — HIGH (ref 70–99)
GLUCOSE BLDC GLUCOMTR-MCNC: 126 MG/DL — HIGH (ref 70–99)
GLUCOSE SERPL-MCNC: 104 MG/DL — HIGH (ref 70–99)
HCT VFR BLD CALC: 33.9 % — LOW (ref 39–50)
HGB BLD-MCNC: 11 G/DL — LOW (ref 13–17)
MCHC RBC-ENTMCNC: 24.9 PG — LOW (ref 27–34)
MCHC RBC-ENTMCNC: 32.4 GM/DL — SIGNIFICANT CHANGE UP (ref 32–36)
MCV RBC AUTO: 76.7 FL — LOW (ref 80–100)
PLATELET # BLD AUTO: 213 K/UL — SIGNIFICANT CHANGE UP (ref 150–400)
POTASSIUM SERPL-MCNC: 3.7 MMOL/L — SIGNIFICANT CHANGE UP (ref 3.5–5.3)
POTASSIUM SERPL-SCNC: 3.7 MMOL/L — SIGNIFICANT CHANGE UP (ref 3.5–5.3)
RBC # BLD: 4.42 M/UL — SIGNIFICANT CHANGE UP (ref 4.2–5.8)
RBC # FLD: 16.3 % — HIGH (ref 10.3–14.5)
SODIUM SERPL-SCNC: 141 MMOL/L — SIGNIFICANT CHANGE UP (ref 135–145)
WBC # BLD: 9.09 K/UL — SIGNIFICANT CHANGE UP (ref 3.8–10.5)
WBC # FLD AUTO: 9.09 K/UL — SIGNIFICANT CHANGE UP (ref 3.8–10.5)

## 2023-06-14 PROCEDURE — 36415 COLL VENOUS BLD VENIPUNCTURE: CPT

## 2023-06-14 PROCEDURE — 82962 GLUCOSE BLOOD TEST: CPT

## 2023-06-14 PROCEDURE — C8929: CPT

## 2023-06-14 PROCEDURE — 87635 SARS-COV-2 COVID-19 AMP PRB: CPT

## 2023-06-14 PROCEDURE — 81001 URINALYSIS AUTO W/SCOPE: CPT

## 2023-06-14 PROCEDURE — 87640 STAPH A DNA AMP PROBE: CPT

## 2023-06-14 PROCEDURE — 83735 ASSAY OF MAGNESIUM: CPT

## 2023-06-14 PROCEDURE — 70450 CT HEAD/BRAIN W/O DYE: CPT

## 2023-06-14 PROCEDURE — 84100 ASSAY OF PHOSPHORUS: CPT

## 2023-06-14 PROCEDURE — 85025 COMPLETE CBC W/AUTO DIFF WBC: CPT

## 2023-06-14 PROCEDURE — 70496 CT ANGIOGRAPHY HEAD: CPT

## 2023-06-14 PROCEDURE — 70498 CT ANGIOGRAPHY NECK: CPT

## 2023-06-14 PROCEDURE — 83036 HEMOGLOBIN GLYCOSYLATED A1C: CPT

## 2023-06-14 PROCEDURE — 87641 MR-STAPH DNA AMP PROBE: CPT

## 2023-06-14 PROCEDURE — 80048 BASIC METABOLIC PNL TOTAL CA: CPT

## 2023-06-14 PROCEDURE — 71045 X-RAY EXAM CHEST 1 VIEW: CPT

## 2023-06-14 PROCEDURE — 85027 COMPLETE CBC AUTOMATED: CPT

## 2023-06-14 PROCEDURE — 93970 EXTREMITY STUDY: CPT

## 2023-06-14 PROCEDURE — 99239 HOSP IP/OBS DSCHRG MGMT >30: CPT

## 2023-06-14 PROCEDURE — 83605 ASSAY OF LACTIC ACID: CPT

## 2023-06-14 RX ORDER — HYDRALAZINE HCL 50 MG
50 TABLET ORAL THREE TIMES A DAY
Refills: 0 | Status: DISCONTINUED | OUTPATIENT
Start: 2023-06-14 | End: 2023-06-14

## 2023-06-14 RX ORDER — APIXABAN 2.5 MG/1
2 TABLET, FILM COATED ORAL
Refills: 0 | DISCHARGE

## 2023-06-14 RX ADMIN — MUPIROCIN 1 APPLICATION(S): 20 OINTMENT TOPICAL at 05:22

## 2023-06-14 RX ADMIN — GABAPENTIN 400 MILLIGRAM(S): 400 CAPSULE ORAL at 05:24

## 2023-06-14 RX ADMIN — LOSARTAN POTASSIUM 50 MILLIGRAM(S): 100 TABLET, FILM COATED ORAL at 05:24

## 2023-06-14 RX ADMIN — Medication 50 MILLIGRAM(S): at 12:21

## 2023-06-14 RX ADMIN — Medication 60 MILLIGRAM(S): at 13:42

## 2023-06-14 RX ADMIN — Medication 60 MILLIGRAM(S): at 05:24

## 2023-06-14 RX ADMIN — CEFEPIME 2000 MILLIGRAM(S): 1 INJECTION, POWDER, FOR SOLUTION INTRAMUSCULAR; INTRAVENOUS at 05:18

## 2023-06-14 RX ADMIN — CEFEPIME 2000 MILLIGRAM(S): 1 INJECTION, POWDER, FOR SOLUTION INTRAMUSCULAR; INTRAVENOUS at 13:42

## 2023-06-14 RX ADMIN — CHLORHEXIDINE GLUCONATE 1 APPLICATION(S): 213 SOLUTION TOPICAL at 05:17

## 2023-06-14 RX ADMIN — GABAPENTIN 400 MILLIGRAM(S): 400 CAPSULE ORAL at 12:21

## 2023-06-14 NOTE — DISCHARGE NOTE PROVIDER - CARE PROVIDER_API CALL
Matias Edmondson  Neurosurgery  [address], [address]  [place] [address]  Phone: [phone]  Fax: [phone]  Follow Up Time: 1 week

## 2023-06-14 NOTE — DISCHARGE NOTE PROVIDER - NSDCFUSCHEDAPPT_GEN_ALL_CORE_FT
Scot Palafox  [facility] Physician [facility]  WOUNDCARE  [address]  Scheduled Appointment: 06/23/2023    Mirta Goldsmith  [facility]  CARDIOLOGY [address]  Scheduled Appointment: 07/13/2023    Alicia Abdalla  [facility]  PHYSMED [address]  Scheduled Appointment: 07/20/2023    Richard Mathis  [facility] Physician [facility]  INTMED [address]  Scheduled Appointment: 08/17/2023

## 2023-06-14 NOTE — DISCHARGE NOTE PROVIDER - NSDCFUADDAPPT_GEN_ALL_CORE_FT
Follow up with neurological surgery within 1 week of discharge. Okay to resume Eliquis on 6/25 5mg twice a day.

## 2023-06-14 NOTE — DISCHARGE NOTE PROVIDER - ATTENDING DISCHARGE PHYSICAL EXAMINATION:
VITALS:   T(C): 36.8 (06-14-23 @ 07:55), Max: 36.8 (06-14-23 @ 07:55)  HR: 96 (06-14-23 @ 04:21) (83 - 96)  BP: 150/99 (06-14-23 @ 04:21) (141/80 - 154/96)  RR: 17 (06-14-23 @ 04:21) (17 - 17)  SpO2: 96% (06-14-23 @ 04:21) (96% - 99%)      CONSTITUTIONAL: NAD, appears stated age, not verbal  ENMT: Moist oral mucosa, no pharyngeal injection or exudates; normal dentition  RESPIRATORY: Normal respiratory effort; clear to auscultation bilaterally  CARDIOVASCULAR: Regular rate and rhythm, no LE edema  ABDOMEN: Nontender to palpation  PSYCH: A+O to person, affect appropriate  SKIN: No rashes; no palpable lesions

## 2023-06-14 NOTE — DISCHARGE NOTE NURSING/CASE MANAGEMENT/SOCIAL WORK - PATIENT PORTAL LINK FT
You can access the FollowMyHealth Patient Portal offered by [facility] by registering at the following website: [email or web address]. By joining [facility]’s FollowMyHealth portal, you will also be able to view your health information using other applications (apps) compatible with our system.

## 2023-06-14 NOTE — DISCHARGE NOTE PROVIDER - NSDCCPCAREPLAN_GEN_ALL_CORE_FT
PRINCIPAL DISCHARGE DIAGNOSIS  Diagnosis: Acute subdural hematoma  Assessment and Plan of Treatment: Followup with neurological surgery outpatient, resume Eliquis on 6/25.      SECONDARY DISCHARGE DIAGNOSES  Diagnosis: Metabolic encephalopathy  Assessment and Plan of Treatment:

## 2023-06-14 NOTE — DISCHARGE NOTE PROVIDER - NSDCHHHOMEBOUND_GEN_ALL_CORE
Quality 128: Preventive Care And Screening: Body Mass Index (Bmi) Screening And Follow-Up Plan: BMI is documented within normal parameters and no follow-up plan is required. Quality 130: Documentation Of Current Medications In The Medical Record: Current Medications Documented Detail Level: Zone Ataxic gait

## 2023-06-14 NOTE — DISCHARGE NOTE PROVIDER - DETAILS OF MALNUTRITION DIAGNOSIS/DIAGNOSES
This patient has been assessed with a concern for Malnutrition and was treated during this hospitalization for the following Nutrition diagnosis/diagnoses:     -  06/13/2023: Severe protein-calorie malnutrition   -  06/13/2023: Underweight (BMI < 19)

## 2023-06-14 NOTE — DISCHARGE NOTE PROVIDER - HOSPITAL COURSE
73M with PMHx of DM, HTN, HLD, Afib on Eliquis, TBI s/p R SDH evac in 9/2021, traumatic SDH 10/22 initially presented to [facility] with AMS found to have CTH revealed 6mm posterior falx SDH, noted to have leukocytosis 20.97 and elevated lactate 3.3. Given 1g Vancomycin, 1g Cefepime, 2L NS bolus, CXR done, blood cultures sent, UA not sent. Eliquis reversed with KCentra and patient transferred to [facility] for further neurosurgical evaluation. No intervention indicated. Repeat CTA with unchanged SDH. Will continue with home gabapentin, mirtazapine, modafinil, okay to resume Eliquis on 6/25 per discussion with neurosurgery. Will need close outpatient neurosx followup.   Course further c/b acute metabolic encephalopathy in the setting of sepsis vs. SDH. All infectious workup negative, including neg UA, neg CXR and BCx, started on empiric cefepime. Suspect fever 2/2 asp pneumonitis, will complete the course with Augmentin x total 5 days.     Stable for discharge home.

## 2023-06-14 NOTE — DISCHARGE NOTE PROVIDER - NSDCHHENCOUNTER_GEN_ALL_CORE

## 2023-06-16 ENCOUNTER — NON-APPOINTMENT (OUTPATIENT)
Age: 73
End: 2023-06-16

## 2023-06-18 ENCOUNTER — RX RENEWAL (OUTPATIENT)
Age: 73
End: 2023-06-18

## 2023-06-18 RX ORDER — BLOOD SUGAR DIAGNOSTIC
STRIP MISCELLANEOUS
Qty: 100 | Refills: 2 | Status: ACTIVE | COMMUNITY
Start: 2020-08-02 | End: 1900-01-01

## 2023-06-21 NOTE — PROGRESS NOTE ADULT - SUBJECTIVE AND OBJECTIVE BOX
[facility] Division of Hospital Medicine  Aleja Saini MD  Available via MS Teams    SUBJECTIVE / OVERNIGHT EVENTS: No acute events overnight. Patient states he is doing well. Denies any new complaints or concerns at this time.     Review of Systems:   CONSTITUTIONAL: No fever   EYES: No eye pain, visual disturbances, or discharge  ENMT:  No difficulty hearing   RESPIRATORY: No SOB. No cough   CARDIOVASCULAR: No chest pain   GASTROINTESTINAL: No abdominal or epigastric pain. No nausea, vomiting, or hematemesis; No diarrhea    GENITOURINARY: No dysuria   NEUROLOGICAL: No headache   SKIN: No itching    MUSCULOSKELETAL: No joint pain or swelling; No muscle, back pain  PSYCHIATRIC: No depression or anxiety   HEME/LYMPH: No easy bruising or bleeding gums      MEDICATIONS  (STANDING):  atorvastatin 40 milliGRAM(s) Oral at bedtime  baclofen 10 milliGRAM(s) Oral every 12 hours  bethanechol 10 milliGRAM(s) Oral daily  buPROPion XL (24-Hour) . 150 milliGRAM(s) Oral daily  diltiazem    milliGRAM(s) Oral daily  enoxaparin Injectable 40 milliGRAM(s) SubCutaneous every 24 hours  gabapentin 300 milliGRAM(s) Oral three times a day  hydrALAZINE 25 milliGRAM(s) Oral every 8 hours  latanoprost 0.005% Ophthalmic Solution 1 Drop(s) Both EYES at bedtime  losartan 50 milliGRAM(s) Oral daily  methylphenidate 20 milliGRAM(s) Oral <User Schedule>  methylphenidate 10 milliGRAM(s) Oral <User Schedule>  polyethylene glycol 3350 17 Gram(s) Oral two times a day  tamsulosin 0.4 milliGRAM(s) Oral at bedtime    MEDICATIONS  (PRN):  senna 2 Tablet(s) Oral at bedtime PRN Constipation      I&O's Summary    21 Oct 2022 07:01  -  22 Oct 2022 07:00  --------------------------------------------------------  IN: 1030 mL / OUT: 2350 mL / NET: -1320 mL    22 Oct 2022 07:01  -  22 Oct 2022 12:15  --------------------------------------------------------  IN: 240 mL / OUT: 0 mL / NET: 240 mL      PHYSICAL EXAM:  Vital Signs Last 24 Hrs  T(C): 36.4 (22 Oct 2022 09:40), Max: 37.3 (21 Oct 2022 17:30)  T(F): 97.6 (22 Oct 2022 09:40), Max: 99.1 (21 Oct 2022 17:30)  HR: 76 (22 Oct 2022 09:40) (76 - 94)  BP: 129/78 (22 Oct 2022 09:40) (129/78 - 172/66)  RR: 18 (22 Oct 2022 09:40) (16 - 18)  SpO2: 93% (22 Oct 2022 09:40) (93% - 97%)    Parameters below as of 22 Oct 2022 09:40  Patient On (Oxygen Delivery Method): room air      CONSTITUTIONAL: NAD, well-developed   EYES: PERRLA; conjunctiva and sclera clear  ENMT: Moist oral mucosa, no pharyngeal injection or exudates   NECK: Supple   RESPIRATORY: Normal respiratory effort; lungs are clear to auscultation bilaterally  CARDIOVASCULAR: Regular rate and rhythm, normal S1 and S2, no murmur   ABDOMEN: Nontender to palpation, normoactive bowel sounds   MUSCULOSKELETAL: no clubbing or cyanosis of digits; no joint swelling or tenderness to palpation  PSYCH: affect appropriate  NEUROLOGY: no gross sensory deficits   SKIN: No rashes     LABS:                        13.5   12.34 )-----------( 202      ( 22 Oct 2022 07:12 )             40.2     10-22    136  |  101  |  11  ----------------------------<  177<H>  3.8   |  24  |  0.88    Ca    8.9      22 Oct 2022 07:10    TPro  6.6  /  Alb  4.2  /  TBili  0.9  /  DBili  x   /  AST  8<L>  /  ALT  11  /  AlkPhos  104  10-21      COVID-19 PCR: NotDetec (19 Oct 2022 13:15)  SARS-CoV-2: Detected (30 Jun 2022 22:20)  SARS-CoV-2: Detected (14 Jun 2022 11:19)  COVID-19 PCR: Detected (14 Jun 2022 11:03)      RADIOLOGY & ADDITIONAL TESTS:  New Imaging Personally Reviewed Today:  New Electrocardiogram Personally Reviewed Today:  Other Results Reviewed Today:   Prior or Outpatient Records Reviewed Today with Summary:    COORDINATION OF CARE:  Consultant Communication and Details of Discussion (where applicable):     Topical Metronidazole Counseling: Metronidazole is a topical antibiotic medication. You may experience burning, stinging, redness, or allergic reactions.  Please call our office if you develop any problems from using this medication.

## 2023-06-22 ENCOUNTER — NON-APPOINTMENT (OUTPATIENT)
Age: 73
End: 2023-06-22

## 2023-06-23 ENCOUNTER — NON-APPOINTMENT (OUTPATIENT)
Age: 73
End: 2023-06-23

## 2023-06-23 ENCOUNTER — OUTPATIENT (OUTPATIENT)
Dept: OUTPATIENT SERVICES | Facility: HOSPITAL | Age: 73
LOS: 1 days | Discharge: HOME | End: 2023-06-23
Payer: COMMERCIAL

## 2023-06-23 ENCOUNTER — APPOINTMENT (OUTPATIENT)
Dept: WOUND CARE | Facility: HOSPITAL | Age: 73
End: 2023-06-23
Payer: COMMERCIAL

## 2023-06-23 VITALS
RESPIRATION RATE: 14 BRPM | TEMPERATURE: 98.1 F | WEIGHT: 125 LBS | DIASTOLIC BLOOD PRESSURE: 89 MMHG | BODY MASS INDEX: 20.83 KG/M2 | HEART RATE: 71 BPM | SYSTOLIC BLOOD PRESSURE: 161 MMHG | HEIGHT: 65 IN | OXYGEN SATURATION: 95 %

## 2023-06-23 VITALS
HEART RATE: 95 BPM | TEMPERATURE: 98.2 F | DIASTOLIC BLOOD PRESSURE: 93 MMHG | WEIGHT: 125 LBS | BODY MASS INDEX: 20.83 KG/M2 | HEIGHT: 65 IN | RESPIRATION RATE: 20 BRPM | OXYGEN SATURATION: 97 % | SYSTOLIC BLOOD PRESSURE: 166 MMHG

## 2023-06-23 DIAGNOSIS — Z98.890 OTHER SPECIFIED POSTPROCEDURAL STATES: Chronic | ICD-10-CM

## 2023-06-23 DIAGNOSIS — L89.150 PRESSURE ULCER OF SACRAL REGION, UNSTAGEABLE: ICD-10-CM

## 2023-06-23 PROCEDURE — 99214 OFFICE O/P EST MOD 30 MIN: CPT

## 2023-06-23 PROCEDURE — 17250 CHEM CAUT OF GRANLTJ TISSUE: CPT

## 2023-06-23 NOTE — HISTORY OF PRESENT ILLNESS
[FreeTextEntry1] : 72 yo WM, here with his wife, for f/u of chronic sacral pressure ulcer. Has 3 new wounds involving BLE. Wife not sure how they came about. Sacral press. ulcer stable and no signs of infection.\par 6/23/23 all wounds without signs of infection. Sacrum with small amount of hypertrophic granulation and cauterized with AgNO3. All other wounds including new one of right shoulder cauterized but unsure if hypertrophic granulation or Squmaous cell Ca. Patient going to dermatologist tuesday,

## 2023-06-23 NOTE — PHYSICAL EXAM
[4 x 4] : 4 x 4  [2 x 2] : 2 x 2  [Normal Thyroid] : the thyroid was normal [Normal Breath Sounds] : Normal breath sounds [Normal Heart Sounds] : normal heart sounds [Normal Rate and Rhythm] : normal rate and rhythm [Ankle Swelling Bilaterally] : bilaterally  [Alert] : alert [Calm] : calm [JVD] : no jugular venous distention  [Ankle Swelling (On Exam)] : not present [Abdomen Masses] : No abdominal massess [Abdomen Tenderness] : ~T ~M No abdominal tenderness [Tender] : nontender [Enlarged] : not enlarged [de-identified] : elderly WM, NAD, alert, contracted [de-identified] : serosanguineous [de-identified] : 25% [de-identified] : 75% [de-identified] : 50% [de-identified] : Anterior Lower Leg- Proximal- CLOSED [de-identified] : serosanguineous [de-identified] : intact [de-identified] : Alginate Ag [de-identified] : Mechanically cleansed with 0.9% NS and sterile gauze\par \par Dry Dressing\par \par Medipore tape [FreeTextEntry1] : Anterior Lower Leg- Distal [FreeTextEntry2] : 1.4 [FreeTextEntry3] : 0.7 [FreeTextEntry4] : <0.1 [de-identified] : intact [de-identified] : Alginate Ag [de-identified] : Mechanically cleansed with 0.9% NS and sterile gauze\par \par Dry dressing \par \par Medipore tape [FreeTextEntry7] : Shoulder- Raised hypergranulation tissue [FreeTextEntry8] : 1.8 [FreeTextEntry9] : 1.6 [de-identified] : 0.1 [de-identified] : serosanguineous [de-identified] : intact [de-identified] : Alginate Ag [de-identified] : Mechanically cleansed with 0.9% NS and sterile gauze\par \par Dry dressing\par \par Medipore tape [de-identified] : No [de-identified] : None [de-identified] : 3x Weekly [de-identified] : Primary Dressing [de-identified] : None [de-identified] : 50% [de-identified] : 3x Weekly [de-identified] : Primary Dressing [de-identified] : Right [de-identified] : Small [de-identified] : No [de-identified] : Pressure [de-identified] : No [de-identified] : other [de-identified] : None [de-identified] : None [de-identified] : 100% [de-identified] : No [de-identified] : 3x Weekly [de-identified] : Primary Dressing [TWNoteComboBox1] : Right [TWNoteComboBox4] : None [TWNoteComboBox5] : No [TWNoteComboBox6] : Other [de-identified] : No [de-identified] : other [de-identified] : None [de-identified] : None [de-identified] : 100% [de-identified] : No [de-identified] : 3x Weekly [de-identified] : Primary Dressing [TWNoteComboBox9] : Right [de-identified] : Small [de-identified] : No [de-identified] : Pressure [de-identified] : No [de-identified] : other [de-identified] : None [de-identified] : None [de-identified] : 100% [de-identified] : No [de-identified] : 3x Weekly [de-identified] : Primary Dressing

## 2023-06-23 NOTE — PLAN
[FreeTextEntry1] : sacrum-ag alginate, allevyn 3X/week\par BLE-ag alginate, DD 3X/week\par new area right shoulder, AgAlginate, DD,3X/week\par all wounds AgNO3 1 stick each. \par f/u 3 wks\par \par time spent 35 mins.

## 2023-06-23 NOTE — ASSESSMENT
[Verbal] : Verbal [Demo] : Demo [Patient] : Patient [Good - alert, interested, motivated] : Good - alert, interested, motivated [Verbalizes knowledge/Understanding] : Verbalizes knowledge/understanding [Skin Care] : skin care [Dressing changes] : dressing changes [Pressure relief] : pressure relief [Signs and symptoms of infection] : sign and symptoms of infection [Nutrition] : nutrition [How and When to Call] : how and when to call [Home Health] : home health [Patient responsibility to plan of care] : patient responsibility to plan of care [] : Yes [Stable] : stable [Home] : Home [Wheelchair] : Wheelchair [Faxed - Long Term Care/Home Health Agency] : Long Term Care/Home Health Agency: Faxed [FreeTextEntry2] : Pt will maintain skin integrity\par Pt will monitor wounds for s/s of infection\par Pt will relieve pressure to bony structures

## 2023-06-24 DIAGNOSIS — Z83.3 FAMILY HISTORY OF DIABETES MELLITUS: ICD-10-CM

## 2023-06-24 DIAGNOSIS — L97.812 NON-PRESSURE CHRONIC ULCER OF OTHER PART OF RIGHT LOWER LEG WITH FAT LAYER EXPOSED: ICD-10-CM

## 2023-06-24 DIAGNOSIS — E53.8 DEFICIENCY OF OTHER SPECIFIED B GROUP VITAMINS: ICD-10-CM

## 2023-06-24 DIAGNOSIS — Z82.49 FAMILY HISTORY OF ISCHEMIC HEART DISEASE AND OTHER DISEASES OF THE CIRCULATORY SYSTEM: ICD-10-CM

## 2023-06-24 DIAGNOSIS — E55.9 VITAMIN D DEFICIENCY, UNSPECIFIED: ICD-10-CM

## 2023-06-24 DIAGNOSIS — Z79.899 OTHER LONG TERM (CURRENT) DRUG THERAPY: ICD-10-CM

## 2023-06-24 DIAGNOSIS — L89.153 PRESSURE ULCER OF SACRAL REGION, STAGE 3: ICD-10-CM

## 2023-06-24 DIAGNOSIS — Z85.820 PERSONAL HISTORY OF MALIGNANT MELANOMA OF SKIN: ICD-10-CM

## 2023-06-24 DIAGNOSIS — Z86.718 PERSONAL HISTORY OF OTHER VENOUS THROMBOSIS AND EMBOLISM: ICD-10-CM

## 2023-06-24 DIAGNOSIS — D72.9 DISORDER OF WHITE BLOOD CELLS, UNSPECIFIED: ICD-10-CM

## 2023-06-24 DIAGNOSIS — Z82.0 FAMILY HISTORY OF EPILEPSY AND OTHER DISEASES OF THE NERVOUS SYSTEM: ICD-10-CM

## 2023-06-24 DIAGNOSIS — Z86.010 PERSONAL HISTORY OF COLONIC POLYPS: ICD-10-CM

## 2023-06-24 DIAGNOSIS — Z79.01 LONG TERM (CURRENT) USE OF ANTICOAGULANTS: ICD-10-CM

## 2023-06-24 DIAGNOSIS — Z80.8 FAMILY HISTORY OF MALIGNANT NEOPLASM OF OTHER ORGANS OR SYSTEMS: ICD-10-CM

## 2023-06-24 DIAGNOSIS — K21.9 GASTRO-ESOPHAGEAL REFLUX DISEASE WITHOUT ESOPHAGITIS: ICD-10-CM

## 2023-06-24 DIAGNOSIS — G91.9 HYDROCEPHALUS, UNSPECIFIED: ICD-10-CM

## 2023-06-24 DIAGNOSIS — E78.00 PURE HYPERCHOLESTEROLEMIA, UNSPECIFIED: ICD-10-CM

## 2023-06-24 DIAGNOSIS — Z98.890 OTHER SPECIFIED POSTPROCEDURAL STATES: ICD-10-CM

## 2023-06-24 DIAGNOSIS — Z86.79 PERSONAL HISTORY OF OTHER DISEASES OF THE CIRCULATORY SYSTEM: ICD-10-CM

## 2023-06-24 DIAGNOSIS — N40.0 BENIGN PROSTATIC HYPERPLASIA WITHOUT LOWER URINARY TRACT SYMPTOMS: ICD-10-CM

## 2023-06-24 DIAGNOSIS — Z83.438 FAMILY HISTORY OF OTHER DISORDER OF LIPOPROTEIN METABOLISM AND OTHER LIPIDEMIA: ICD-10-CM

## 2023-06-24 DIAGNOSIS — L92.9 GRANULOMATOUS DISORDER OF THE SKIN AND SUBCUTANEOUS TISSUE, UNSPECIFIED: ICD-10-CM

## 2023-06-24 DIAGNOSIS — K44.9 DIAPHRAGMATIC HERNIA WITHOUT OBSTRUCTION OR GANGRENE: ICD-10-CM

## 2023-06-24 DIAGNOSIS — I10 ESSENTIAL (PRIMARY) HYPERTENSION: ICD-10-CM

## 2023-06-24 DIAGNOSIS — Z79.84 LONG TERM (CURRENT) USE OF ORAL HYPOGLYCEMIC DRUGS: ICD-10-CM

## 2023-06-24 DIAGNOSIS — E11.40 TYPE 2 DIABETES MELLITUS WITH DIABETIC NEUROPATHY, UNSPECIFIED: ICD-10-CM

## 2023-06-24 NOTE — ASSESSMENT
[Verbal] : Verbal [Demo] : Demo [Patient] : Patient [Spouse] : Spouse [Caregiver] : Caregiver [Good - alert, interested, motivated] : Good - alert, interested, motivated [Verbalizes knowledge/Understanding] : Verbalizes knowledge/understanding [Dressing changes] : dressing changes [Skin Care] : skin care [Pressure relief] : pressure relief [Signs and symptoms of infection] : sign and symptoms of infection [Nutrition] : nutrition [How and When to Call] : how and when to call [Off-loading] : off-loading [Patient responsibility to plan of care] : patient responsibility to plan of care [] : Yes [Stable] : stable [Home] : Home [Wheelchair] : Wheelchair [Not Applicable - Long Term Care/Home Health Agency] : Long Term Care/Home Health Agency: Not Applicable [FreeTextEntry2] : Pt will maintain skin integrity\par Pt will monitor wound for s/s of infection\par Pt will adhere to proper diet for optimal wound healing\par  [FreeTextEntry4] : Follow up in 3 weeks

## 2023-06-24 NOTE — PLAN
[FreeTextEntry1] : sacrum-ag alginate, allevyn qod\par BLE-ag alginate, DD qod\par f/u 3 wks\par \par time spent 25 mins.

## 2023-06-24 NOTE — HISTORY OF PRESENT ILLNESS
[FreeTextEntry1] : 74 yo WM, here with his wife, for f/u of chronic sacral pressure ulcer. Has 3 new wounds involving BLE. Wife not sure how they came about. Sacral press. ulcer stable and no signs of infection.

## 2023-06-24 NOTE — PHYSICAL EXAM
[4 x 4] : 4 x 4  [Abdominal Pad] : Abdominal Pad [2 x 2] : 2 x 2  [Normal Thyroid] : the thyroid was normal [Normal Breath Sounds] : Normal breath sounds [Normal Heart Sounds] : normal heart sounds [Normal Rate and Rhythm] : normal rate and rhythm [Ankle Swelling Bilaterally] : bilaterally  [Alert] : alert [Calm] : calm [JVD] : no jugular venous distention  [Ankle Swelling (On Exam)] : not present [Abdomen Masses] : No abdominal massess [Abdomen Tenderness] : ~T ~M No abdominal tenderness [Tender] : nontender [Enlarged] : not enlarged [de-identified] : elderly WM, NAD, alert, contracted [de-identified] : 25% [de-identified] : 75% [FreeTextEntry7] : Anterior Lower Leg [FreeTextEntry8] : 0.7 [FreeTextEntry9] : 0.3 [de-identified] : 0.2 [de-identified] : serosanguineous [de-identified] : intact [de-identified] : 50% [de-identified] : Alginate Ag [de-identified] : Mechanically cleansed with 0.9% NS and sterile gauze\par \par Medipore tape [de-identified] : Anterior Lower Leg- Proximal- Raised Hypergranulation Tissue [de-identified] : 2.5 [de-identified] : 1.8 [de-identified] : 0.1 [de-identified] : serosanguineous [de-identified] : intact [de-identified] : Alginate Ag [de-identified] : Mechanically cleansed with 0.9% NS and sterile gauze\par \par Medipore tape [FreeTextEntry1] : Anterior Lower Leg- Distal [FreeTextEntry2] : 1.4 [FreeTextEntry3] : 1.2 [FreeTextEntry4] : < 0.1 [de-identified] : scant serosanguineous [de-identified] : intact [de-identified] : Alginate Ag [de-identified] : Mechanically cleansed with 0.9% NS and sterile gauze\par \par Medipore tape [TWNoteComboBox4] : Small [de-identified] : No [de-identified] : other [de-identified] : None [de-identified] : 3x Weekly [de-identified] : Primary Dressing [TWNoteComboBox9] : Left [de-identified] : Small [de-identified] : No [de-identified] : Other [de-identified] : No [de-identified] : other [de-identified] : None [de-identified] : None [de-identified] : 50% [de-identified] : Yes [de-identified] : Every other day [de-identified] : Primary Dressing [de-identified] : Right [de-identified] : Small [de-identified] : No [de-identified] : Other [de-identified] : No [de-identified] : None [de-identified] : None [de-identified] : 100% [de-identified] : No [de-identified] : 3x Weekly [de-identified] : Primary Dressing [TWNoteComboBox1] : Right [TWNoteComboBox5] : No [TWNoteComboBox6] : Other [de-identified] : No [de-identified] : None [de-identified] : None [de-identified] : 100% [de-identified] : No [de-identified] : 3x Weekly [de-identified] : Primary Dressing

## 2023-07-02 ENCOUNTER — RX RENEWAL (OUTPATIENT)
Age: 73
End: 2023-07-02

## 2023-07-06 ENCOUNTER — APPOINTMENT (OUTPATIENT)
Dept: INTERNAL MEDICINE | Facility: CLINIC | Age: 73
End: 2023-07-06
Payer: COMMERCIAL

## 2023-07-06 VITALS
HEIGHT: 65 IN | WEIGHT: 125 LBS | TEMPERATURE: 97.5 F | BODY MASS INDEX: 20.83 KG/M2 | SYSTOLIC BLOOD PRESSURE: 140 MMHG | DIASTOLIC BLOOD PRESSURE: 80 MMHG

## 2023-07-06 PROCEDURE — 99214 OFFICE O/P EST MOD 30 MIN: CPT

## 2023-07-10 ENCOUNTER — APPOINTMENT (OUTPATIENT)
Dept: NEUROSURGERY | Facility: CLINIC | Age: 73
End: 2023-07-10
Payer: COMMERCIAL

## 2023-07-10 DIAGNOSIS — S06.5XAA TRAUMATIC SUBDURAL HEMORRHAGE WITH LOSS OF CONSCIOUSNESS STATUS UNKNOWN, INITIAL ENCOUNTER: ICD-10-CM

## 2023-07-10 PROCEDURE — 99214 OFFICE O/P EST MOD 30 MIN: CPT

## 2023-07-10 NOTE — REASON FOR VISIT
[Follow-Up: _____] : a [unfilled] follow-up visit [Spouse] : spouse [FreeTextEntry1] : Hospital Course: \par Discharge Date	14-Jun-2023 \par Admission Date	11-Jun-2023 03:21 \par Reason for Admission	SDH \par Hospital Course	 \par 73M with PMHx of DM, HTN, HLD, Afib on Eliquis, TBI s/p R SDH evac in 9/2021, \par traumatic SDH 10/22 initially presented to [facility] with AMS found to \par have CTH revealed 6mm posterior falx SDH, noted to have leukocytosis 20.97 and \par elevated lactate 3.3. Given 1g Vancomycin, 1g Cefepime, 2L NS bolus, CXR done, \par blood cultures sent, UA not sent. Eliquis reversed with KCentra and patient \par transferred to [facility] for further neurosurgical evaluation. No intervention \par indicated. Repeat CTA with unchanged SDH. Will continue with home gabapentin, \par mirtazapine, modafinil, okay to resume Eliquis on 6/25 per discussion with \par neurosurgery. Will need close outpatient neurosx followup. \par Course further c/b acute metabolic encephalopathy in the setting of sepsis vs. \par SDH. All infectious workup negative, including neg UA, neg CXR and BCx, started \par on empiric cefepime. Suspect fever 2/2 asp pneumonitis, will complete the \par course with Augmentin x total 5 days. \par Presents with wife.  He is alert in a wheelchair.  \par She denies any headaches, n/v or vision changes.  She has not had any seizures.   Denies any numbness/tingling or weakness of extremities.

## 2023-07-10 NOTE — ASSESSMENT
[FreeTextEntry1] : Mr. Jarrell presents for post hospitalization f/u for SDH.\par Per wife, he is at his baseline.\par They have f/u appointment with Dr. Abdalla.\par He is back on his Eliquis 5 mg BID, she does not wish to consider any repeat imaging at this time.\par f/u as needed.\par Patient has been given an opportunity to ask and have their questions answered to their satisfaction.\par Patient knows to call the office if there are any new or worsening symptoms.\par \par \par I, Dr. Matias Edmondson, personally performed the evaluation and management (E/M) services for this patient.  That E/M includes assessment of the initial examination, assessing the pertinent medical and surgical history, and establishing the plan of care.  At the time of the visit, my ACP, Basilia Ch, actively participated in the evaluation and management services for this patient to be followed going forward in accordance with the plan documented in the clinical note.\par \par \par

## 2023-07-13 ENCOUNTER — APPOINTMENT (OUTPATIENT)
Dept: CARDIOLOGY | Facility: CLINIC | Age: 73
End: 2023-07-13
Payer: COMMERCIAL

## 2023-07-13 ENCOUNTER — NON-APPOINTMENT (OUTPATIENT)
Age: 73
End: 2023-07-13

## 2023-07-13 VITALS — HEART RATE: 81 BPM | DIASTOLIC BLOOD PRESSURE: 93 MMHG | HEIGHT: 66 IN | SYSTOLIC BLOOD PRESSURE: 157 MMHG

## 2023-07-13 PROCEDURE — 93000 ELECTROCARDIOGRAM COMPLETE: CPT

## 2023-07-13 PROCEDURE — 99215 OFFICE O/P EST HI 40 MIN: CPT

## 2023-07-13 NOTE — REASON FOR VISIT
[CV Risk Factors and Non-Cardiac Disease] : CV risk factors and non-cardiac disease [Arrhythmia/ECG Abnorrmalities] : arrhythmia/ECG abnormalities [Follow-Up - Clinic] : a clinic follow-up of [Anticoagulation] : anticoagulation [Atrial Fibrillation] : atrial fibrillation [Hyperlipidemia] : hyperlipidemia [Hypertension] : hypertension [Medication Management] : Medication management

## 2023-07-14 ENCOUNTER — OUTPATIENT (OUTPATIENT)
Dept: OUTPATIENT SERVICES | Facility: HOSPITAL | Age: 73
LOS: 1 days | Discharge: HOME | End: 2023-07-14
Payer: COMMERCIAL

## 2023-07-14 ENCOUNTER — APPOINTMENT (OUTPATIENT)
Dept: WOUND CARE | Facility: HOSPITAL | Age: 73
End: 2023-07-14
Payer: COMMERCIAL

## 2023-07-14 VITALS
TEMPERATURE: 97.8 F | SYSTOLIC BLOOD PRESSURE: 138 MMHG | HEART RATE: 87 BPM | BODY MASS INDEX: 20.09 KG/M2 | HEIGHT: 66 IN | RESPIRATION RATE: 18 BRPM | DIASTOLIC BLOOD PRESSURE: 90 MMHG | OXYGEN SATURATION: 95 % | WEIGHT: 125 LBS

## 2023-07-14 DIAGNOSIS — Z98.890 OTHER SPECIFIED POSTPROCEDURAL STATES: Chronic | ICD-10-CM

## 2023-07-14 DIAGNOSIS — L89.150 PRESSURE ULCER OF SACRAL REGION, UNSTAGEABLE: ICD-10-CM

## 2023-07-14 PROCEDURE — 99213 OFFICE O/P EST LOW 20 MIN: CPT

## 2023-07-14 PROCEDURE — G0463: CPT

## 2023-07-14 NOTE — ASSESSMENT
[Verbal] : Verbal [Demo] : Demo [Patient] : Patient [Spouse] : Spouse [Caregiver] : Caregiver [Good - alert, interested, motivated] : Good - alert, interested, motivated [Verbalizes knowledge/Understanding] : Verbalizes knowledge/understanding [Dressing changes] : dressing changes [Skin Care] : skin care [Pressure relief] : pressure relief [Signs and symptoms of infection] : sign and symptoms of infection [Nutrition] : nutrition [How and When to Call] : how and when to call [Off-loading] : off-loading [Patient responsibility to plan of care] : patient responsibility to plan of care [] : Yes [Stable] : stable [Home] : Home [Wheelchair] : Wheelchair [Faxed - Long Term Care/Home Health Agency] : Long Term Care/Home Health Agency: Faxed [FreeTextEntry2] : Pt will maintain skin integrity\par Pt will monitor wounds for s/s of infection\par Pt will adhere to proper diet for optimal wound care [FreeTextEntry4] : Sacrum and Right shoulder: Allevyn border\par RLE, LLE: Alginate Ag, DD\par \par Follow up in 3 weeks [FreeTextEntry1] : FORD

## 2023-07-14 NOTE — PLAN
[FreeTextEntry1] : offlload; frequent change of position/rotation\par Allevyn to right shoulder/sacrum\par ag alginate, DD to BLE\par f/u 3 wks\par \par time spent 29 mins.

## 2023-07-14 NOTE — PHYSICAL EXAM
[4 x 4] : 4 x 4  [2 x 2] : 2 x 2  [Normal Thyroid] : the thyroid was normal [Normal Breath Sounds] : Normal breath sounds [Normal Heart Sounds] : normal heart sounds [Normal Rate and Rhythm] : normal rate and rhythm [Alert] : alert [Oriented to Person] : oriented to person [Oriented to Place] : oriented to place [Oriented to Time] : oriented to time [Calm] : calm [JVD] : no jugular venous distention  [Abdomen Masses] : No abdominal massess [Abdomen Tenderness] : ~T ~M No abdominal tenderness [Tender] : nontender [Enlarged] : not enlarged [de-identified] : elderly WM, NAD, alert. [de-identified] : serosanguineous [de-identified] : 25% [de-identified] : 75% [de-identified] : 1.8 [de-identified] : Anterior Lower Leg- Proximal- CLOSED [de-identified] : 1.2 [de-identified] : 0.3 [de-identified] : serosanguineous [de-identified] : intact [de-identified] : no product [de-identified] : Mechanically cleansed with 0.9% NS and sterile gauze\par \par  [FreeTextEntry1] : Anterior Lower leg- Distal [FreeTextEntry3] : 0.7 [FreeTextEntry2] : 1.4 [FreeTextEntry4] : <0.1 [de-identified] : intact [de-identified] : Alginate Ag [de-identified] : Mechanically cleansed with 0.9% NS and sterile gauze\par \par \par Dry dressing\par \par Medipore tape [FreeTextEntry7] : Shoulder- Raised hypergranulation tissue- CLOSED [FreeTextEntry8] : 1.8 [FreeTextEntry9] : 1.5 [de-identified] : 0.1 [de-identified] : serosanguineous [de-identified] : intact [de-identified] : Allevyn  [de-identified] : Mechanically cleansed with 0.9% NS and sterile gauze\par \par  Medipore tape [TWNoteComboBox2] : 3 [de-identified] : No [de-identified] : None [de-identified] : 3x Weekly [de-identified] : Primary Dressing [de-identified] : None [de-identified] : 100% [de-identified] : 3x Weekly [de-identified] : Primary Dressing [de-identified] : Right [de-identified] : Small [de-identified] : No [de-identified] : Other [de-identified] : No [de-identified] : other [de-identified] : None [de-identified] : None [de-identified] : 100% [de-identified] : No [de-identified] : 3x Weekly [de-identified] : Primary Dressing  [TWNoteComboBox1] : Right [TWNoteComboBox4] : None [TWNoteComboBox5] : No [TWNoteComboBox6] : Other [de-identified] : No [de-identified] : other [de-identified] : None [de-identified] : None [de-identified] : 100% [de-identified] : No [de-identified] : 3x Weekly [de-identified] : Primary Dressing [TWNoteComboBox9] : Right [de-identified] : Small [de-identified] : No [de-identified] : Pressure [de-identified] : No [de-identified] : other [de-identified] : None [de-identified] : None [de-identified] : 100% [de-identified] : No [de-identified] : 3x Weekly [de-identified] : Primary Dressing

## 2023-07-14 NOTE — HISTORY OF PRESENT ILLNESS
[FreeTextEntry1] : 72 yo WM, here with his wife, for f/u of chronic sacral pressure ulcer and  wounds involving BLE. Wife not sure how they came about. Sacral press. ulcer has now healed. Pt also with a right shoulder wound that has healed and a bx by his dermatologist revealed BCC as per pt's wife. Pt next going back for a bx of his LLE wound that has a raised pink, soft growth coming out of the wound.  The RLE wound appears to be a pressure ulcer. Reminded his wife of importance of offloading/frequent rotation/change of position.

## 2023-07-17 DIAGNOSIS — G91.9 HYDROCEPHALUS, UNSPECIFIED: ICD-10-CM

## 2023-07-17 DIAGNOSIS — E55.9 VITAMIN D DEFICIENCY, UNSPECIFIED: ICD-10-CM

## 2023-07-17 DIAGNOSIS — N40.0 BENIGN PROSTATIC HYPERPLASIA WITHOUT LOWER URINARY TRACT SYMPTOMS: ICD-10-CM

## 2023-07-17 DIAGNOSIS — Z85.820 PERSONAL HISTORY OF MALIGNANT MELANOMA OF SKIN: ICD-10-CM

## 2023-07-17 DIAGNOSIS — Z86.010 PERSONAL HISTORY OF COLONIC POLYPS: ICD-10-CM

## 2023-07-17 DIAGNOSIS — I10 ESSENTIAL (PRIMARY) HYPERTENSION: ICD-10-CM

## 2023-07-17 DIAGNOSIS — Z86.79 PERSONAL HISTORY OF OTHER DISEASES OF THE CIRCULATORY SYSTEM: ICD-10-CM

## 2023-07-17 DIAGNOSIS — L89.153 PRESSURE ULCER OF SACRAL REGION, STAGE 3: ICD-10-CM

## 2023-07-17 DIAGNOSIS — Z79.84 LONG TERM (CURRENT) USE OF ORAL HYPOGLYCEMIC DRUGS: ICD-10-CM

## 2023-07-17 DIAGNOSIS — Z83.3 FAMILY HISTORY OF DIABETES MELLITUS: ICD-10-CM

## 2023-07-17 DIAGNOSIS — E78.00 PURE HYPERCHOLESTEROLEMIA, UNSPECIFIED: ICD-10-CM

## 2023-07-17 DIAGNOSIS — Z80.8 FAMILY HISTORY OF MALIGNANT NEOPLASM OF OTHER ORGANS OR SYSTEMS: ICD-10-CM

## 2023-07-17 DIAGNOSIS — E53.8 DEFICIENCY OF OTHER SPECIFIED B GROUP VITAMINS: ICD-10-CM

## 2023-07-17 DIAGNOSIS — Z86.718 PERSONAL HISTORY OF OTHER VENOUS THROMBOSIS AND EMBOLISM: ICD-10-CM

## 2023-07-17 DIAGNOSIS — K21.9 GASTRO-ESOPHAGEAL REFLUX DISEASE WITHOUT ESOPHAGITIS: ICD-10-CM

## 2023-07-17 DIAGNOSIS — Z82.49 FAMILY HISTORY OF ISCHEMIC HEART DISEASE AND OTHER DISEASES OF THE CIRCULATORY SYSTEM: ICD-10-CM

## 2023-07-17 DIAGNOSIS — Z98.890 OTHER SPECIFIED POSTPROCEDURAL STATES: ICD-10-CM

## 2023-07-17 DIAGNOSIS — D72.9 DISORDER OF WHITE BLOOD CELLS, UNSPECIFIED: ICD-10-CM

## 2023-07-17 DIAGNOSIS — K44.9 DIAPHRAGMATIC HERNIA WITHOUT OBSTRUCTION OR GANGRENE: ICD-10-CM

## 2023-07-17 DIAGNOSIS — L97.812 NON-PRESSURE CHRONIC ULCER OF OTHER PART OF RIGHT LOWER LEG WITH FAT LAYER EXPOSED: ICD-10-CM

## 2023-07-17 DIAGNOSIS — Z79.01 LONG TERM (CURRENT) USE OF ANTICOAGULANTS: ICD-10-CM

## 2023-07-17 DIAGNOSIS — E11.40 TYPE 2 DIABETES MELLITUS WITH DIABETIC NEUROPATHY, UNSPECIFIED: ICD-10-CM

## 2023-07-17 DIAGNOSIS — Z83.438 FAMILY HISTORY OF OTHER DISORDER OF LIPOPROTEIN METABOLISM AND OTHER LIPIDEMIA: ICD-10-CM

## 2023-07-17 DIAGNOSIS — Z82.0 FAMILY HISTORY OF EPILEPSY AND OTHER DISEASES OF THE NERVOUS SYSTEM: ICD-10-CM

## 2023-07-17 DIAGNOSIS — Z79.899 OTHER LONG TERM (CURRENT) DRUG THERAPY: ICD-10-CM

## 2023-07-17 NOTE — HISTORY OF PRESENT ILLNESS
[FreeTextEntry1] : 73 year old man with a history of atrial fibrillation, SDH HTN, HLD, allergic conjunctivitis, neuropathy, SDH s/p mechanical fall, recurrent ICH when on AC. \par  \par He was admitted  with nausea and found to have CTH showed L IPH with parafalcine SDH. \par He was again admitted into [facility] from 1/7/23- 1/10/23 due to flu A.  Incidentally he was found to have an extensive DVT of the left leg.  He was placed back on Eliquis full dose of 5 mg twice a day.  Hospital course was complicated by atrial fibrillation with rapid ventricular response, Cardizem dosing was increased to 180 mg daily.\par \par Since his last visit he presented to [facility] in 6/2023 with AMS found to have CTH revealed 6mm osterior falx SDH, noted to have leukocytosis 20.97 and elevated lactate 3.3. Given 1g Vancomycin, 1g Cefepime, 2L NS bolus, CXR done, blood cultures sent, UA not sent. Eliquis reversed with KCentra and patient transferred to [facility] for further neurosurgical evaluation. No intervention indicated. Repeat CTA with unchanged SDH.His eliquis was resumed. His course was complicated by asp PNA. I personally reviewed all of the hospital records available to me at this time, which included but are not limited to the discharge summary, labs and imaging reports. \par \par Since his discharge he clinically has not changed. He arrives today with his wife.  She denies any new concerning symptoms. He is still confined to the wheelchair for most of the day. He   denies any chest pain, PND, orthopnea, lower extremity edema, near syncope, syncope. Medication reconciliation performed. He is compliant with his medications. \par \par \par

## 2023-07-17 NOTE — REVIEW OF SYSTEMS
[Negative] : Heme/Lymph [FreeTextEntry9] : Wheelchair-bound [de-identified] : No new focal symptoms, wheelchair bound

## 2023-07-17 NOTE — CARDIOLOGY SUMMARY
[de-identified] : Atrial flutter-fibrillation \par -Right sided conduction defect and right axis -possible right ventricular hypertrophy or posterior fascicular block. \par  -Old inferior infarct  -Poor R-wave progression -nonspecific -consider old anterior infarct.  [de-identified] : 7/02/20 nromal LV function mild LVH \par 6/2022 normal LV function -EF 55 to 60% Mild concentric LVH

## 2023-07-17 NOTE — PHYSICAL EXAM
[No Acute Distress] : no acute distress [Normal Venous Pressure] : normal venous pressure [Tachycardia] : tachycardic [Soft] : abdomen soft [Non Tender] : non-tender [No Edema] : no edema [Normal] : alert and oriented, normal memory [General Appearance - In No Acute Distress] : no acute distress [Normal Conjunctiva] : the conjunctiva exhibited no abnormalities [Eyelids - No Xanthelasma] : the eyelids demonstrated no xanthelasmas [Normal Oral Mucosa] : normal oral mucosa [No Oral Pallor] : no oral pallor [No Oral Cyanosis] : no oral cyanosis [Normal Jugular Venous V Waves Present] : normal jugular venous V waves present [Respiration, Rhythm And Depth] : normal respiratory rhythm and effort [Exaggerated Use Of Accessory Muscles For Inspiration] : no accessory muscle use [Auscultation Breath Sounds / Voice Sounds] : lungs were clear to auscultation bilaterally [Bowel Sounds] : normal bowel sounds [Abdomen Soft] : soft [Abdomen Tenderness] : non-tender [Nail Clubbing] : no clubbing of the fingernails [Cyanosis, Localized] : no localized cyanosis [Petechial Hemorrhages (___cm)] : no petechial hemorrhages [Skin Color & Pigmentation] : normal skin color and pigmentation [] : no rash [Oriented To Time, Place, And Person] : oriented to person, place, and time [Affect] : the affect was normal [Mood] : the mood was normal [No Anxiety] : not feeling anxious [Not Palpable] : not palpable [No Precordial Heave] : no precordial heave was noted [Normal Rate] : normal [Irregularly Irregular] : irregularly irregular [Normal S1] : normal S1 [Normal S2] : normal S2 [No Gallop] : no gallop heard [No Murmur] : no murmurs heard [2+] : left 2+ [No Abnormalities] : the abdominal aorta was not enlarged and no bruit was heard [No Pitting Edema] : no pitting edema present [de-identified] : Immobile [FreeTextEntry1] :  in wheelchair [Right Carotid Bruit] : no bruit heard over the right carotid [Left Carotid Bruit] : no bruit heard over the left carotid [Bruit] : no bruit heard

## 2023-07-17 NOTE — DISCUSSION/SUMMARY
[FreeTextEntry1] : 73 year old man with HTN, HLD, DM and permanent atrial fibrillation who presents to the office for follow-up. \par \par Rex unfortunately has history of recurrent ICH. He was previously off of AC, however, due to the development of extensive DVTs he needed full dose AC. Now he had a recurrent SDH. Despite his recurrent ICH he was deemed to high risk to completely stop AC because of his high recurrent VTE risk. \par He will continue Eliquis. \par \par He is in no acute distress at today's visit.  ECG illustrates atrial fibrillation with rate of 114 bpm.  \par \par His blood pressure is controlled, but is HR is still elevated, I have advised we increase Cardizem to 240mg daily. To avoid a drop in B/P, he will decrease Hydralazine 25 mg q8 and maintain losartan 50mg q12.\par \par He will continue his Crestor 10 and Vascepa for mixed hyperlipidemia, goal dLDL <70.\par \par He will follow with me in 3 months.  [EKG obtained to assist in diagnosis and management of assessed problem(s)] : EKG obtained to assist in diagnosis and management of assessed problem(s)

## 2023-07-18 ENCOUNTER — RX RENEWAL (OUTPATIENT)
Age: 73
End: 2023-07-18

## 2023-07-24 ENCOUNTER — RX RENEWAL (OUTPATIENT)
Age: 73
End: 2023-07-24

## 2023-07-27 RX ORDER — WHEELCHAIR
EACH MISCELLANEOUS
Qty: 1 | Refills: 0 | Status: ACTIVE | OUTPATIENT
Start: 2023-07-27

## 2023-07-28 NOTE — PHYSICAL EXAM
[Normal] : no carotid or abdominal bruits heard, no varicosities, pedal pulses are present, no peripheral edema, no extremity clubbing or cyanosis and no palpable aorta [de-identified] : contracture right non verbal

## 2023-07-28 NOTE — HISTORY OF PRESENT ILLNESS
[FreeTextEntry1] : f/u [de-identified] : admitted 6/11 to 6/14 [facility]\par recurrent bleed with change in mental\par eliuqes held until 6/25\par patient wheelchair bound\par non comunicative\par  but cries appropiately\par fed by hand with some cough\par uses thick it\par declined peg \par high risk for aspiration but pallative approach\par decubiti almost healed\par excellent personal aide\par skin squamos cell being removed\par

## 2023-07-28 NOTE — PLAN
[FreeTextEntry1] : bp ok control\par decubiti under care\par skin care appropaite\par contineu present med\par labs reviewed from hospital\par \par Will need a hospital bed for  head elevation of 30 degrees to assist in proper\par breathing and avoid aspiration. The patient requires frequent diaper changing and frequent body repositioning to help with skin healing and prevent new skin breakdown

## 2023-07-28 NOTE — HEALTH RISK ASSESSMENT
[No] : No [No falls in past year] : Patient reported no falls in the past year [0] : 2) Feeling down, depressed, or hopeless: Not at all (0) [PHQ-2 Negative - No further assessment needed] : PHQ-2 Negative - No further assessment needed [[id number]] : 0

## 2023-07-28 NOTE — REVIEW OF SYSTEMS
[Negative] : Gastrointestinal [FreeTextEntry9] : contracture right side [de-identified] : non verbal

## 2023-08-04 ENCOUNTER — OUTPATIENT (OUTPATIENT)
Dept: OUTPATIENT SERVICES | Facility: HOSPITAL | Age: 73
LOS: 1 days | Discharge: HOME | End: 2023-08-04
Payer: COMMERCIAL

## 2023-08-04 ENCOUNTER — APPOINTMENT (OUTPATIENT)
Dept: WOUND CARE | Facility: HOSPITAL | Age: 73
End: 2023-08-04
Payer: COMMERCIAL

## 2023-08-04 VITALS
SYSTOLIC BLOOD PRESSURE: 155 MMHG | HEIGHT: 66 IN | DIASTOLIC BLOOD PRESSURE: 90 MMHG | WEIGHT: 125 LBS | BODY MASS INDEX: 20.09 KG/M2 | OXYGEN SATURATION: 96 % | HEART RATE: 97 BPM | TEMPERATURE: 98.1 F | RESPIRATION RATE: 18 BRPM

## 2023-08-04 DIAGNOSIS — Z98.890 OTHER SPECIFIED POSTPROCEDURAL STATES: Chronic | ICD-10-CM

## 2023-08-04 DIAGNOSIS — L89.153 PRESSURE ULCER OF SACRAL REGION, STAGE 3: ICD-10-CM

## 2023-08-04 PROCEDURE — G0463: CPT

## 2023-08-04 PROCEDURE — 99214 OFFICE O/P EST MOD 30 MIN: CPT

## 2023-08-07 DIAGNOSIS — K44.9 DIAPHRAGMATIC HERNIA WITHOUT OBSTRUCTION OR GANGRENE: ICD-10-CM

## 2023-08-07 DIAGNOSIS — Z86.010 PERSONAL HISTORY OF COLONIC POLYPS: ICD-10-CM

## 2023-08-07 DIAGNOSIS — E11.40 TYPE 2 DIABETES MELLITUS WITH DIABETIC NEUROPATHY, UNSPECIFIED: ICD-10-CM

## 2023-08-07 DIAGNOSIS — K21.9 GASTRO-ESOPHAGEAL REFLUX DISEASE WITHOUT ESOPHAGITIS: ICD-10-CM

## 2023-08-07 DIAGNOSIS — Z83.3 FAMILY HISTORY OF DIABETES MELLITUS: ICD-10-CM

## 2023-08-07 DIAGNOSIS — E55.9 VITAMIN D DEFICIENCY, UNSPECIFIED: ICD-10-CM

## 2023-08-07 DIAGNOSIS — Z85.820 PERSONAL HISTORY OF MALIGNANT MELANOMA OF SKIN: ICD-10-CM

## 2023-08-07 DIAGNOSIS — I10 ESSENTIAL (PRIMARY) HYPERTENSION: ICD-10-CM

## 2023-08-07 DIAGNOSIS — N40.0 BENIGN PROSTATIC HYPERPLASIA WITHOUT LOWER URINARY TRACT SYMPTOMS: ICD-10-CM

## 2023-08-07 DIAGNOSIS — Z80.8 FAMILY HISTORY OF MALIGNANT NEOPLASM OF OTHER ORGANS OR SYSTEMS: ICD-10-CM

## 2023-08-07 DIAGNOSIS — E53.8 DEFICIENCY OF OTHER SPECIFIED B GROUP VITAMINS: ICD-10-CM

## 2023-08-07 DIAGNOSIS — Z79.899 OTHER LONG TERM (CURRENT) DRUG THERAPY: ICD-10-CM

## 2023-08-07 DIAGNOSIS — D72.9 DISORDER OF WHITE BLOOD CELLS, UNSPECIFIED: ICD-10-CM

## 2023-08-07 DIAGNOSIS — G91.9 HYDROCEPHALUS, UNSPECIFIED: ICD-10-CM

## 2023-08-07 DIAGNOSIS — Z98.890 OTHER SPECIFIED POSTPROCEDURAL STATES: ICD-10-CM

## 2023-08-07 DIAGNOSIS — Z86.79 PERSONAL HISTORY OF OTHER DISEASES OF THE CIRCULATORY SYSTEM: ICD-10-CM

## 2023-08-07 DIAGNOSIS — Z82.0 FAMILY HISTORY OF EPILEPSY AND OTHER DISEASES OF THE NERVOUS SYSTEM: ICD-10-CM

## 2023-08-07 DIAGNOSIS — Z79.01 LONG TERM (CURRENT) USE OF ANTICOAGULANTS: ICD-10-CM

## 2023-08-07 DIAGNOSIS — L97.812 NON-PRESSURE CHRONIC ULCER OF OTHER PART OF RIGHT LOWER LEG WITH FAT LAYER EXPOSED: ICD-10-CM

## 2023-08-07 DIAGNOSIS — Z83.438 FAMILY HISTORY OF OTHER DISORDER OF LIPOPROTEIN METABOLISM AND OTHER LIPIDEMIA: ICD-10-CM

## 2023-08-07 DIAGNOSIS — Z86.718 PERSONAL HISTORY OF OTHER VENOUS THROMBOSIS AND EMBOLISM: ICD-10-CM

## 2023-08-07 DIAGNOSIS — E78.00 PURE HYPERCHOLESTEROLEMIA, UNSPECIFIED: ICD-10-CM

## 2023-08-07 DIAGNOSIS — Z82.49 FAMILY HISTORY OF ISCHEMIC HEART DISEASE AND OTHER DISEASES OF THE CIRCULATORY SYSTEM: ICD-10-CM

## 2023-08-07 DIAGNOSIS — L89.153 PRESSURE ULCER OF SACRAL REGION, STAGE 3: ICD-10-CM

## 2023-08-07 DIAGNOSIS — Z79.84 LONG TERM (CURRENT) USE OF ORAL HYPOGLYCEMIC DRUGS: ICD-10-CM

## 2023-08-08 NOTE — HISTORY OF PRESENT ILLNESS
[FreeTextEntry1] : 72 yo WM, here with his wife, for f/u of chronic sacral pressure ulcer and  wounds involving BLE. Wife not sure how they came about. Sacral press. ulcer has now healed. Pt also with a right shoulder wound that has healed and a bx by his dermatologist revealed BCC as per pt's wife. Pt next going back for a bx of his LLE wound that has a raised pink, soft growth coming out of the wound.  The RLE wound appears to be a pressure ulcer. Reminded his wife of importance of offloading/frequent rotation/change of position. 8/4/23 bilateral lower legs with skin Ca to be removed by his dermatologist. Buttock ulcer much improved and basically closed. Right shoulder closed

## 2023-08-08 NOTE — PHYSICAL EXAM
[Normal Thyroid] : the thyroid was normal [Normal Breath Sounds] : Normal breath sounds [Normal Heart Sounds] : normal heart sounds [Normal Rate and Rhythm] : normal rate and rhythm [Alert] : alert [Oriented to Person] : oriented to person [Oriented to Place] : oriented to place [Oriented to Time] : oriented to time [Calm] : calm [4 x 4] : 4 x 4  [Abdominal Pad] : Abdominal Pad [JVD] : no jugular venous distention  [Abdomen Masses] : No abdominal massess [Abdomen Tenderness] : ~T ~M No abdominal tenderness [Tender] : nontender [Enlarged] : not enlarged [de-identified] : elderly WM, NAD, alert. [de-identified] : Right anterior lower leg more distal  [de-identified] : closed  [FreeTextEntry1] : Right Shoulder [FreeTextEntry2] : Closed  [de-identified] : none  [FreeTextEntry7] : Left anterior leg [FreeTextEntry8] : 2.9 [FreeTextEntry9] : 2.0 [de-identified] : Basal Cell [de-identified] : Basal Cell [de-identified] : Alginate AG, DD [de-identified] : 100% [de-identified] : 3x Weekly [de-identified] : Small [de-identified] : No [de-identified] : Normal [de-identified] : 100%

## 2023-08-08 NOTE — ASSESSMENT
[Verbal] : Verbal [Written] : Written [Patient] : Patient [Spouse] : Spouse [Good - alert, interested, motivated] : Good - alert, interested, motivated [Verbalizes knowledge/Understanding] : Verbalizes knowledge/understanding [Dressing changes] : dressing changes [Signs and symptoms of infection] : sign and symptoms of infection [How and When to Call] : how and when to call [Patient responsibility to plan of care] : patient responsibility to plan of care [Stable] : stable [Home] : Home [Wheelchair] : Wheelchair [Faxed - Long Term Care/Home Health Agency] : Long Term Care/Home Health Agency: Faxed [] : No [FreeTextEntry2] : infection mitigation strategies  nutrition turn and position.  [FreeTextEntry3] : Yes [FreeTextEntry4] : patient to follow up in one month after excision of left lower leg basal cell.   Rx provided for Roho cushion and Grp 1 Gel overlay.  Spouse to present to S for procurement.  [FreeTextEntry1] : [facility]

## 2023-08-08 NOTE — PLAN
[FreeTextEntry1] : offlload; frequent change of position/rotation no dressing needed to right shoulder Sacrum/ buttock closed ABD pad QD/PRN soiling ag alginate, DD to BLE 3X/week f/u 3 wks  time spent 30 mins.

## 2023-08-09 ENCOUNTER — NON-APPOINTMENT (OUTPATIENT)
Age: 73
End: 2023-08-09

## 2023-08-11 NOTE — REASON FOR VISIT
n/a [Follow-Up: _____] : a [unfilled] follow-up visit [Spouse] : spouse [Formal Caregiver] : formal caregiver [Other: _____] : [unfilled]

## 2023-08-12 ENCOUNTER — INPATIENT (INPATIENT)
Facility: HOSPITAL | Age: 73
LOS: 7 days | Discharge: HOME | DRG: 871 | End: 2023-08-20
Attending: FAMILY MEDICINE | Admitting: STUDENT IN AN ORGANIZED HEALTH CARE EDUCATION/TRAINING PROGRAM
Payer: COMMERCIAL

## 2023-08-12 VITALS
DIASTOLIC BLOOD PRESSURE: 63 MMHG | WEIGHT: 139.99 LBS | RESPIRATION RATE: 16 BRPM | SYSTOLIC BLOOD PRESSURE: 100 MMHG | OXYGEN SATURATION: 98 % | HEIGHT: 64 IN | TEMPERATURE: 99 F | HEART RATE: 78 BPM

## 2023-08-12 DIAGNOSIS — Z98.890 OTHER SPECIFIED POSTPROCEDURAL STATES: Chronic | ICD-10-CM

## 2023-08-12 LAB
ALBUMIN SERPL ELPH-MCNC: 3 G/DL — LOW (ref 3.3–5)
ALP SERPL-CCNC: 92 U/L — SIGNIFICANT CHANGE UP (ref 40–120)
ALT FLD-CCNC: 18 U/L — SIGNIFICANT CHANGE UP (ref 12–78)
ANION GAP SERPL CALC-SCNC: 26 MMOL/L — HIGH (ref 5–17)
ANISOCYTOSIS BLD QL: SLIGHT — SIGNIFICANT CHANGE UP
AST SERPL-CCNC: 13 U/L — LOW (ref 15–37)
BASE EXCESS BLDV CALC-SCNC: -19 MMOL/L — LOW (ref -2–3)
BASOPHILS # BLD AUTO: 0 K/UL — SIGNIFICANT CHANGE UP (ref 0–0.2)
BASOPHILS NFR BLD AUTO: 0 % — SIGNIFICANT CHANGE UP (ref 0–2)
BILIRUB SERPL-MCNC: 0.5 MG/DL — SIGNIFICANT CHANGE UP (ref 0.2–1.2)
BLOOD GAS COMMENTS, VENOUS: SIGNIFICANT CHANGE UP
BUN SERPL-MCNC: 31 MG/DL — HIGH (ref 7–23)
BURR CELLS BLD QL SMEAR: PRESENT — SIGNIFICANT CHANGE UP
CALCIUM SERPL-MCNC: 8.5 MG/DL — SIGNIFICANT CHANGE UP (ref 8.5–10.1)
CHLORIDE SERPL-SCNC: 106 MMOL/L — SIGNIFICANT CHANGE UP (ref 96–108)
CK MB CFR SERPL CALC: 1 NG/ML — SIGNIFICANT CHANGE UP (ref 0–3.6)
CO2 SERPL-SCNC: 11 MMOL/L — LOW (ref 22–31)
CREAT SERPL-MCNC: 1.5 MG/DL — HIGH (ref 0.5–1.3)
EGFR: 49 ML/MIN/1.73M2 — LOW
ELLIPTOCYTES BLD QL SMEAR: SLIGHT — SIGNIFICANT CHANGE UP
EOSINOPHIL # BLD AUTO: 0 K/UL — SIGNIFICANT CHANGE UP (ref 0–0.5)
EOSINOPHIL NFR BLD AUTO: 0 % — SIGNIFICANT CHANGE UP (ref 0–6)
GAS PNL BLDV: SIGNIFICANT CHANGE UP
GLUCOSE SERPL-MCNC: 66 MG/DL — LOW (ref 70–99)
HCO3 BLDV-SCNC: 9 MMOL/L — CRITICAL LOW (ref 22–29)
HCT VFR BLD CALC: 42.6 % — SIGNIFICANT CHANGE UP (ref 39–50)
HGB BLD-MCNC: 12.9 G/DL — LOW (ref 13–17)
HYPOCHROMIA BLD QL: SLIGHT — SIGNIFICANT CHANGE UP
LACTATE SERPL-SCNC: 14.4 MMOL/L — CRITICAL HIGH (ref 0.7–2)
LYMPHOCYTES # BLD AUTO: 2.28 K/UL — SIGNIFICANT CHANGE UP (ref 1–3.3)
LYMPHOCYTES # BLD AUTO: 5 % — LOW (ref 13–44)
MAGNESIUM SERPL-MCNC: 1.8 MG/DL — SIGNIFICANT CHANGE UP (ref 1.6–2.6)
MANUAL SMEAR VERIFICATION: SIGNIFICANT CHANGE UP
MCHC RBC-ENTMCNC: 24.5 PG — LOW (ref 27–34)
MCHC RBC-ENTMCNC: 30.3 GM/DL — LOW (ref 32–36)
MCV RBC AUTO: 81 FL — SIGNIFICANT CHANGE UP (ref 80–100)
MICROCYTES BLD QL: SLIGHT — SIGNIFICANT CHANGE UP
MONOCYTES # BLD AUTO: 1.37 K/UL — HIGH (ref 0–0.9)
MONOCYTES NFR BLD AUTO: 3 % — SIGNIFICANT CHANGE UP (ref 2–14)
NEUTROPHILS # BLD AUTO: 41.88 K/UL — HIGH (ref 1.8–7.4)
NEUTROPHILS NFR BLD AUTO: 84 % — HIGH (ref 43–77)
NEUTS BAND # BLD: 8 % — SIGNIFICANT CHANGE UP (ref 0–8)
NRBC # BLD: 0 /100 — SIGNIFICANT CHANGE UP (ref 0–0)
NRBC # BLD: SIGNIFICANT CHANGE UP /100 WBCS (ref 0–0)
NT-PROBNP SERPL-SCNC: 1654 PG/ML — HIGH (ref 0–125)
OVALOCYTES BLD QL SMEAR: SLIGHT — SIGNIFICANT CHANGE UP
PCO2 BLDV: 23 MMHG — LOW (ref 42–55)
PH BLDV: 7.2 — LOW (ref 7.32–7.43)
PLAT MORPH BLD: NORMAL — SIGNIFICANT CHANGE UP
PLATELET # BLD AUTO: 269 K/UL — SIGNIFICANT CHANGE UP (ref 150–400)
PLATELET CLUMP BLD QL SMEAR: SLIGHT
PO2 BLDV: 129 MMHG — HIGH (ref 25–45)
POIKILOCYTOSIS BLD QL AUTO: SLIGHT — SIGNIFICANT CHANGE UP
POTASSIUM SERPL-MCNC: 4.5 MMOL/L — SIGNIFICANT CHANGE UP (ref 3.5–5.3)
POTASSIUM SERPL-SCNC: 4.5 MMOL/L — SIGNIFICANT CHANGE UP (ref 3.5–5.3)
PROT SERPL-MCNC: 6.1 G/DL — SIGNIFICANT CHANGE UP (ref 6–8.3)
RBC # BLD: 5.26 M/UL — SIGNIFICANT CHANGE UP (ref 4.2–5.8)
RBC # FLD: 18.7 % — HIGH (ref 10.3–14.5)
RBC BLD AUTO: ABNORMAL
SAO2 % BLDV: 98.8 % — HIGH (ref 67–88)
SMUDGE CELLS # BLD: PRESENT — SIGNIFICANT CHANGE UP
SODIUM SERPL-SCNC: 143 MMOL/L — SIGNIFICANT CHANGE UP (ref 135–145)
TROPONIN I, HIGH SENSITIVITY RESULT: 6.7 NG/L — SIGNIFICANT CHANGE UP
WBC # BLD: 45.52 K/UL — CRITICAL HIGH (ref 3.8–10.5)
WBC # FLD AUTO: 45.52 K/UL — CRITICAL HIGH (ref 3.8–10.5)

## 2023-08-12 PROCEDURE — 93010 ELECTROCARDIOGRAM REPORT: CPT

## 2023-08-12 PROCEDURE — 74176 CT ABD & PELVIS W/O CONTRAST: CPT | Mod: 26,MA

## 2023-08-12 PROCEDURE — 99291 CRITICAL CARE FIRST HOUR: CPT

## 2023-08-12 PROCEDURE — 70450 CT HEAD/BRAIN W/O DYE: CPT | Mod: 26,MA

## 2023-08-12 PROCEDURE — 71045 X-RAY EXAM CHEST 1 VIEW: CPT | Mod: 26

## 2023-08-12 PROCEDURE — 71250 CT THORAX DX C-: CPT | Mod: 26,MA

## 2023-08-12 RX ORDER — CEFEPIME 1 G/1
1000 INJECTION, POWDER, FOR SOLUTION INTRAMUSCULAR; INTRAVENOUS ONCE
Refills: 0 | Status: COMPLETED | OUTPATIENT
Start: 2023-08-12 | End: 2023-08-12

## 2023-08-12 RX ORDER — SODIUM CHLORIDE 9 MG/ML
1000 INJECTION INTRAMUSCULAR; INTRAVENOUS; SUBCUTANEOUS ONCE
Refills: 0 | Status: COMPLETED | OUTPATIENT
Start: 2023-08-12 | End: 2023-08-12

## 2023-08-12 RX ORDER — AZITHROMYCIN 500 MG/1
500 TABLET, FILM COATED ORAL ONCE
Refills: 0 | Status: COMPLETED | OUTPATIENT
Start: 2023-08-12 | End: 2023-08-12

## 2023-08-12 RX ADMIN — SODIUM CHLORIDE 1000 MILLILITER(S): 9 INJECTION INTRAMUSCULAR; INTRAVENOUS; SUBCUTANEOUS at 21:36

## 2023-08-12 NOTE — ED PROVIDER NOTE - WR INTERPRETATION DATE TIME  1
The types of intraocular lenses were reviewed with the patient along with a discussion of their various strengths and weaknesses. 13-Aug-2023 00:11

## 2023-08-12 NOTE — ED PROVIDER NOTE - OBJECTIVE STATEMENT
73-year-old male with past medical history of DM, HTN, HLD A-fib on Eliquis, TBI status post SDH with EVAC on 9/2021, and history of traumatic subdural hemorrhage in 10/2022, presents to the emergency department by ambulance with wife at the bedside, states that the patient is bedbound to wheelchair, patient had been constipated and wife has been giving him laxatives, today patient had nausea and vomiting and diarrhea, wife states that the patient became lethargic and unresponsive for about 2 hours, EMS reports shortness of breath and nonrebreather was placed.

## 2023-08-12 NOTE — ED PROVIDER NOTE - DIFFERENTIAL DIAGNOSIS
Aspiration pneumonia, intracranial pathology, obstruction, UTI, electrolyte abnormality Differential Diagnosis

## 2023-08-12 NOTE — ED ADULT TRIAGE NOTE - CHIEF COMPLAINT QUOTE
Patient brought  in by ambulance from home as reported was having altered mental status since 2:30 pm with shortness of breath received on non-rebreather mask

## 2023-08-12 NOTE — ED PROVIDER NOTE - ATTENDING CONTRIBUTION TO CARE
Noted elevated white count, elevated lactate level, taken off nonrebreather patient satting in the 80s, spoke with wife, states that she does not want CPR done, but is not against intubation if needed, and is okay with pressors if needed, ICU consult called

## 2023-08-12 NOTE — ED PROVIDER NOTE - CLINICAL SUMMARY MEDICAL DECISION MAKING FREE TEXT BOX
73-year-old male with altered mental status, lethargy, shortness of breath, nausea and vomiting, follow-up CT head, CT chest, CT abdomen and pelvis, chest x-ray, placed on cardiac monitor and give oxygen as needed, send CBC, CMP, lactate, blood cultures, UA and urine culture, give IV fluids, get EKG and reevaluate

## 2023-08-13 DIAGNOSIS — I48.20 CHRONIC ATRIAL FIBRILLATION, UNSPECIFIED: ICD-10-CM

## 2023-08-13 DIAGNOSIS — Z29.9 ENCOUNTER FOR PROPHYLACTIC MEASURES, UNSPECIFIED: ICD-10-CM

## 2023-08-13 DIAGNOSIS — J18.9 PNEUMONIA, UNSPECIFIED ORGANISM: ICD-10-CM

## 2023-08-13 DIAGNOSIS — K52.9 NONINFECTIVE GASTROENTERITIS AND COLITIS, UNSPECIFIED: ICD-10-CM

## 2023-08-13 DIAGNOSIS — A41.9 SEPSIS, UNSPECIFIED ORGANISM: ICD-10-CM

## 2023-08-13 DIAGNOSIS — E13.10 OTHER SPECIFIED DIABETES MELLITUS WITH KETOACIDOSIS WITHOUT COMA: ICD-10-CM

## 2023-08-13 DIAGNOSIS — R52 PAIN, UNSPECIFIED: ICD-10-CM

## 2023-08-13 DIAGNOSIS — N17.9 ACUTE KIDNEY FAILURE, UNSPECIFIED: ICD-10-CM

## 2023-08-13 DIAGNOSIS — E78.5 HYPERLIPIDEMIA, UNSPECIFIED: ICD-10-CM

## 2023-08-13 DIAGNOSIS — I10 ESSENTIAL (PRIMARY) HYPERTENSION: ICD-10-CM

## 2023-08-13 LAB
ALBUMIN SERPL ELPH-MCNC: 2.5 G/DL — LOW (ref 3.3–5)
ALBUMIN SERPL ELPH-MCNC: 2.6 G/DL — LOW (ref 3.3–5)
ALP SERPL-CCNC: 71 U/L — SIGNIFICANT CHANGE UP (ref 40–120)
ALP SERPL-CCNC: 79 U/L — SIGNIFICANT CHANGE UP (ref 40–120)
ALT FLD-CCNC: 14 U/L — SIGNIFICANT CHANGE UP (ref 12–78)
ALT FLD-CCNC: 15 U/L — SIGNIFICANT CHANGE UP (ref 12–78)
ANION GAP SERPL CALC-SCNC: 16 MMOL/L — SIGNIFICANT CHANGE UP (ref 5–17)
ANION GAP SERPL CALC-SCNC: 20 MMOL/L — HIGH (ref 5–17)
ANION GAP SERPL CALC-SCNC: 24 MMOL/L — HIGH (ref 5–17)
APPEARANCE UR: ABNORMAL
APTT BLD: 34.8 SEC — SIGNIFICANT CHANGE UP (ref 24.5–35.6)
AST SERPL-CCNC: 11 U/L — LOW (ref 15–37)
AST SERPL-CCNC: 13 U/L — LOW (ref 15–37)
BASE EXCESS BLDV CALC-SCNC: -10.4 MMOL/L — LOW (ref -2–3)
BASE EXCESS BLDV CALC-SCNC: -15.8 MMOL/L — LOW (ref -2–3)
BASOPHILS # BLD AUTO: 0.17 K/UL — SIGNIFICANT CHANGE UP (ref 0–0.2)
BASOPHILS NFR BLD AUTO: 0.3 % — SIGNIFICANT CHANGE UP (ref 0–2)
BILIRUB SERPL-MCNC: 0.4 MG/DL — SIGNIFICANT CHANGE UP (ref 0.2–1.2)
BILIRUB SERPL-MCNC: 0.4 MG/DL — SIGNIFICANT CHANGE UP (ref 0.2–1.2)
BILIRUB UR-MCNC: NEGATIVE — SIGNIFICANT CHANGE UP
BLOOD GAS COMMENTS, VENOUS: SIGNIFICANT CHANGE UP
BLOOD GAS COMMENTS, VENOUS: SIGNIFICANT CHANGE UP
BUN SERPL-MCNC: 36 MG/DL — HIGH (ref 7–23)
BUN SERPL-MCNC: 39 MG/DL — HIGH (ref 7–23)
BUN SERPL-MCNC: 40 MG/DL — HIGH (ref 7–23)
CALCIUM SERPL-MCNC: 7.7 MG/DL — LOW (ref 8.5–10.1)
CALCIUM SERPL-MCNC: 7.8 MG/DL — LOW (ref 8.5–10.1)
CALCIUM SERPL-MCNC: 8.2 MG/DL — LOW (ref 8.5–10.1)
CHLORIDE SERPL-SCNC: 103 MMOL/L — SIGNIFICANT CHANGE UP (ref 96–108)
CHLORIDE SERPL-SCNC: 103 MMOL/L — SIGNIFICANT CHANGE UP (ref 96–108)
CHLORIDE SERPL-SCNC: 107 MMOL/L — SIGNIFICANT CHANGE UP (ref 96–108)
CO2 SERPL-SCNC: 10 MMOL/L — CRITICAL LOW (ref 22–31)
CO2 SERPL-SCNC: 14 MMOL/L — LOW (ref 22–31)
CO2 SERPL-SCNC: 18 MMOL/L — LOW (ref 22–31)
COLOR SPEC: SIGNIFICANT CHANGE UP
CREAT SERPL-MCNC: 1.7 MG/DL — HIGH (ref 0.5–1.3)
CREAT SERPL-MCNC: 1.8 MG/DL — HIGH (ref 0.5–1.3)
CREAT SERPL-MCNC: 1.9 MG/DL — HIGH (ref 0.5–1.3)
DIFF PNL FLD: NEGATIVE — SIGNIFICANT CHANGE UP
EGFR: 37 ML/MIN/1.73M2 — LOW
EGFR: 40 ML/MIN/1.73M2 — LOW
EGFR: 43 ML/MIN/1.73M2 — LOW
EOSINOPHIL # BLD AUTO: 0.1 K/UL — SIGNIFICANT CHANGE UP (ref 0–0.5)
EOSINOPHIL NFR BLD AUTO: 0.2 % — SIGNIFICANT CHANGE UP (ref 0–6)
GLUCOSE SERPL-MCNC: 143 MG/DL — HIGH (ref 70–99)
GLUCOSE SERPL-MCNC: 164 MG/DL — HIGH (ref 70–99)
GLUCOSE SERPL-MCNC: 253 MG/DL — HIGH (ref 70–99)
GLUCOSE UR QL: 100 MG/DL
HCO3 BLDV-SCNC: 12 MMOL/L — LOW (ref 22–29)
HCO3 BLDV-SCNC: 16 MMOL/L — LOW (ref 22–29)
HCT VFR BLD CALC: 36.5 % — LOW (ref 39–50)
HGB BLD-MCNC: 10.9 G/DL — LOW (ref 13–17)
IMM GRANULOCYTES NFR BLD AUTO: 1.8 % — HIGH (ref 0–0.9)
INR BLD: 1.87 RATIO — HIGH (ref 0.85–1.18)
KETONES UR-MCNC: ABNORMAL MG/DL
LACTATE SERPL-SCNC: 10.7 MMOL/L — CRITICAL HIGH (ref 0.7–2)
LACTATE SERPL-SCNC: 5 MMOL/L — CRITICAL HIGH (ref 0.7–2)
LACTATE SERPL-SCNC: 6.7 MMOL/L — CRITICAL HIGH (ref 0.7–2)
LACTATE SERPL-SCNC: 6.7 MMOL/L — CRITICAL HIGH (ref 0.7–2)
LEUKOCYTE ESTERASE UR-ACNC: ABNORMAL
LYMPHOCYTES # BLD AUTO: 1.25 K/UL — SIGNIFICANT CHANGE UP (ref 1–3.3)
LYMPHOCYTES # BLD AUTO: 2.5 % — LOW (ref 13–44)
MAGNESIUM SERPL-MCNC: 1.6 MG/DL — SIGNIFICANT CHANGE UP (ref 1.6–2.6)
MAGNESIUM SERPL-MCNC: 1.7 MG/DL — SIGNIFICANT CHANGE UP (ref 1.6–2.6)
MCHC RBC-ENTMCNC: 24.8 PG — LOW (ref 27–34)
MCHC RBC-ENTMCNC: 29.9 GM/DL — LOW (ref 32–36)
MCV RBC AUTO: 83 FL — SIGNIFICANT CHANGE UP (ref 80–100)
MONOCYTES # BLD AUTO: 1.88 K/UL — HIGH (ref 0–0.9)
MONOCYTES NFR BLD AUTO: 3.7 % — SIGNIFICANT CHANGE UP (ref 2–14)
MRSA PCR RESULT.: DETECTED
NEUTROPHILS # BLD AUTO: 45.95 K/UL — HIGH (ref 1.8–7.4)
NEUTROPHILS NFR BLD AUTO: 91.5 % — HIGH (ref 43–77)
NITRITE UR-MCNC: NEGATIVE — SIGNIFICANT CHANGE UP
NRBC # BLD: 0 /100 WBCS — SIGNIFICANT CHANGE UP (ref 0–0)
PCO2 BLDV: 29 MMHG — LOW (ref 42–55)
PCO2 BLDV: 36 MMHG — LOW (ref 42–55)
PH BLDV: 7.22 — LOW (ref 7.32–7.43)
PH BLDV: 7.27 — LOW (ref 7.32–7.43)
PH UR: 5 — SIGNIFICANT CHANGE UP (ref 5–8)
PHOSPHATE SERPL-MCNC: 5.4 MG/DL — HIGH (ref 2.5–4.5)
PHOSPHATE SERPL-MCNC: 5.9 MG/DL — HIGH (ref 2.5–4.5)
PLATELET # BLD AUTO: 347 K/UL — SIGNIFICANT CHANGE UP (ref 150–400)
PO2 BLDV: 81 MMHG — HIGH (ref 25–45)
PO2 BLDV: 89 MMHG — HIGH (ref 25–45)
POTASSIUM SERPL-MCNC: 4.4 MMOL/L — SIGNIFICANT CHANGE UP (ref 3.5–5.3)
POTASSIUM SERPL-MCNC: 4.6 MMOL/L — SIGNIFICANT CHANGE UP (ref 3.5–5.3)
POTASSIUM SERPL-MCNC: 4.8 MMOL/L — SIGNIFICANT CHANGE UP (ref 3.5–5.3)
POTASSIUM SERPL-SCNC: 4.4 MMOL/L — SIGNIFICANT CHANGE UP (ref 3.5–5.3)
POTASSIUM SERPL-SCNC: 4.6 MMOL/L — SIGNIFICANT CHANGE UP (ref 3.5–5.3)
POTASSIUM SERPL-SCNC: 4.8 MMOL/L — SIGNIFICANT CHANGE UP (ref 3.5–5.3)
PROT SERPL-MCNC: 5.3 G/DL — LOW (ref 6–8.3)
PROT SERPL-MCNC: 5.4 G/DL — LOW (ref 6–8.3)
PROT UR-MCNC: 300 MG/DL
PROTHROM AB SERPL-ACNC: 21.5 SEC — HIGH (ref 9.5–13)
RAPID RVP RESULT: SIGNIFICANT CHANGE UP
RBC # BLD: 4.4 M/UL — SIGNIFICANT CHANGE UP (ref 4.2–5.8)
RBC # FLD: 17.9 % — HIGH (ref 10.3–14.5)
S AUREUS DNA NOSE QL NAA+PROBE: DETECTED
SAO2 % BLDV: 97.8 % — HIGH (ref 67–88)
SAO2 % BLDV: 98.7 % — HIGH (ref 67–88)
SARS-COV-2 RNA SPEC QL NAA+PROBE: SIGNIFICANT CHANGE UP
SODIUM SERPL-SCNC: 137 MMOL/L — SIGNIFICANT CHANGE UP (ref 135–145)
SODIUM SERPL-SCNC: 137 MMOL/L — SIGNIFICANT CHANGE UP (ref 135–145)
SODIUM SERPL-SCNC: 141 MMOL/L — SIGNIFICANT CHANGE UP (ref 135–145)
SP GR SPEC: 1.02 — SIGNIFICANT CHANGE UP (ref 1–1.03)
TROPONIN I, HIGH SENSITIVITY RESULT: 76.9 NG/L — SIGNIFICANT CHANGE UP
UROBILINOGEN FLD QL: 1 MG/DL — SIGNIFICANT CHANGE UP (ref 0.2–1)
WBC # BLD: 50.24 K/UL — CRITICAL HIGH (ref 3.8–10.5)
WBC # FLD AUTO: 50.24 K/UL — CRITICAL HIGH (ref 3.8–10.5)

## 2023-08-13 PROCEDURE — 12345: CPT | Mod: NC

## 2023-08-13 PROCEDURE — G1004: CPT

## 2023-08-13 PROCEDURE — 76604 US EXAM CHEST: CPT | Mod: 26

## 2023-08-13 PROCEDURE — 99222 1ST HOSP IP/OBS MODERATE 55: CPT | Mod: GC

## 2023-08-13 PROCEDURE — 99291 CRITICAL CARE FIRST HOUR: CPT | Mod: GC

## 2023-08-13 PROCEDURE — 70450 CT HEAD/BRAIN W/O DYE: CPT | Mod: 26,ME

## 2023-08-13 RX ORDER — AZITHROMYCIN 500 MG/1
500 TABLET, FILM COATED ORAL EVERY 24 HOURS
Refills: 0 | Status: DISCONTINUED | OUTPATIENT
Start: 2023-08-14 | End: 2023-08-14

## 2023-08-13 RX ORDER — SODIUM CHLORIDE 9 MG/ML
500 INJECTION, SOLUTION INTRAVENOUS ONCE
Refills: 0 | Status: COMPLETED | OUTPATIENT
Start: 2023-08-13 | End: 2023-08-13

## 2023-08-13 RX ORDER — MUPIROCIN 20 MG/G
1 OINTMENT TOPICAL EVERY 12 HOURS
Refills: 0 | Status: COMPLETED | OUTPATIENT
Start: 2023-08-13 | End: 2023-08-18

## 2023-08-13 RX ORDER — PIPERACILLIN AND TAZOBACTAM 4; .5 G/20ML; G/20ML
3.38 INJECTION, POWDER, LYOPHILIZED, FOR SOLUTION INTRAVENOUS ONCE
Refills: 0 | Status: COMPLETED | OUTPATIENT
Start: 2023-08-13 | End: 2023-08-13

## 2023-08-13 RX ORDER — SODIUM CHLORIDE 9 MG/ML
1000 INJECTION, SOLUTION INTRAVENOUS
Refills: 0 | Status: DISCONTINUED | OUTPATIENT
Start: 2023-08-13 | End: 2023-08-20

## 2023-08-13 RX ORDER — ACETAMINOPHEN 500 MG
1000 TABLET ORAL ONCE
Refills: 0 | Status: COMPLETED | OUTPATIENT
Start: 2023-08-13 | End: 2023-08-13

## 2023-08-13 RX ORDER — SODIUM CHLORIDE 9 MG/ML
1000 INJECTION, SOLUTION INTRAVENOUS
Refills: 0 | Status: DISCONTINUED | OUTPATIENT
Start: 2023-08-13 | End: 2023-08-13

## 2023-08-13 RX ORDER — PIPERACILLIN AND TAZOBACTAM 4; .5 G/20ML; G/20ML
3.38 INJECTION, POWDER, LYOPHILIZED, FOR SOLUTION INTRAVENOUS EVERY 8 HOURS
Refills: 0 | Status: COMPLETED | OUTPATIENT
Start: 2023-08-13 | End: 2023-08-20

## 2023-08-13 RX ORDER — DEXTROSE 50 % IN WATER 50 %
15 SYRINGE (ML) INTRAVENOUS ONCE
Refills: 0 | Status: DISCONTINUED | OUTPATIENT
Start: 2023-08-13 | End: 2023-08-20

## 2023-08-13 RX ORDER — CHLORHEXIDINE GLUCONATE 213 G/1000ML
1 SOLUTION TOPICAL
Refills: 0 | Status: DISCONTINUED | OUTPATIENT
Start: 2023-08-13 | End: 2023-08-13

## 2023-08-13 RX ORDER — DILTIAZEM HCL 120 MG
5 CAPSULE, EXT RELEASE 24 HR ORAL ONCE
Refills: 0 | Status: COMPLETED | OUTPATIENT
Start: 2023-08-13 | End: 2023-08-13

## 2023-08-13 RX ORDER — CHLORHEXIDINE GLUCONATE 213 G/1000ML
1 SOLUTION TOPICAL EVERY 24 HOURS
Refills: 0 | Status: DISCONTINUED | OUTPATIENT
Start: 2023-08-14 | End: 2023-08-20

## 2023-08-13 RX ORDER — SODIUM CHLORIDE 9 MG/ML
4 INJECTION INTRAMUSCULAR; INTRAVENOUS; SUBCUTANEOUS EVERY 12 HOURS
Refills: 0 | Status: DISCONTINUED | OUTPATIENT
Start: 2023-08-13 | End: 2023-08-20

## 2023-08-13 RX ORDER — NOREPINEPHRINE BITARTRATE/D5W 8 MG/250ML
0.05 PLASTIC BAG, INJECTION (ML) INTRAVENOUS
Qty: 8 | Refills: 0 | Status: DISCONTINUED | OUTPATIENT
Start: 2023-08-13 | End: 2023-08-14

## 2023-08-13 RX ORDER — HEPARIN SODIUM 5000 [USP'U]/ML
5000 INJECTION INTRAVENOUS; SUBCUTANEOUS EVERY 8 HOURS
Refills: 0 | Status: DISCONTINUED | OUTPATIENT
Start: 2023-08-13 | End: 2023-08-15

## 2023-08-13 RX ORDER — DEXTROSE 50 % IN WATER 50 %
25 SYRINGE (ML) INTRAVENOUS ONCE
Refills: 0 | Status: DISCONTINUED | OUTPATIENT
Start: 2023-08-13 | End: 2023-08-20

## 2023-08-13 RX ORDER — VANCOMYCIN HCL 1 G
1000 VIAL (EA) INTRAVENOUS ONCE
Refills: 0 | Status: COMPLETED | OUTPATIENT
Start: 2023-08-13 | End: 2023-08-13

## 2023-08-13 RX ORDER — ATORVASTATIN CALCIUM 80 MG/1
40 TABLET, FILM COATED ORAL AT BEDTIME
Refills: 0 | Status: DISCONTINUED | OUTPATIENT
Start: 2023-08-13 | End: 2023-08-20

## 2023-08-13 RX ORDER — APIXABAN 2.5 MG/1
1 TABLET, FILM COATED ORAL
Refills: 0 | DISCHARGE

## 2023-08-13 RX ORDER — MIRTAZAPINE 45 MG/1
7.5 TABLET, ORALLY DISINTEGRATING ORAL DAILY
Refills: 0 | Status: DISCONTINUED | OUTPATIENT
Start: 2023-08-13 | End: 2023-08-20

## 2023-08-13 RX ORDER — HYDROCORTISONE 20 MG
50 TABLET ORAL EVERY 6 HOURS
Refills: 0 | Status: DISCONTINUED | OUTPATIENT
Start: 2023-08-13 | End: 2023-08-14

## 2023-08-13 RX ORDER — SODIUM CHLORIDE 9 MG/ML
10 INJECTION INTRAMUSCULAR; INTRAVENOUS; SUBCUTANEOUS
Refills: 0 | Status: DISCONTINUED | OUTPATIENT
Start: 2023-08-13 | End: 2023-08-20

## 2023-08-13 RX ORDER — PANTOPRAZOLE SODIUM 20 MG/1
40 TABLET, DELAYED RELEASE ORAL DAILY
Refills: 0 | Status: DISCONTINUED | OUTPATIENT
Start: 2023-08-13 | End: 2023-08-20

## 2023-08-13 RX ORDER — GLUCAGON INJECTION, SOLUTION 0.5 MG/.1ML
1 INJECTION, SOLUTION SUBCUTANEOUS ONCE
Refills: 0 | Status: DISCONTINUED | OUTPATIENT
Start: 2023-08-13 | End: 2023-08-20

## 2023-08-13 RX ORDER — ROSUVASTATIN CALCIUM 5 MG/1
1 TABLET ORAL
Refills: 0 | DISCHARGE

## 2023-08-13 RX ORDER — HYDRALAZINE HCL 50 MG
50 TABLET ORAL
Qty: 0 | Refills: 0 | DISCHARGE

## 2023-08-13 RX ORDER — SODIUM BICARBONATE 1 MEQ/ML
0.1 SYRINGE (ML) INTRAVENOUS
Qty: 50 | Refills: 0 | Status: DISCONTINUED | OUTPATIENT
Start: 2023-08-13 | End: 2023-08-13

## 2023-08-13 RX ORDER — INSULIN LISPRO 100/ML
VIAL (ML) SUBCUTANEOUS
Refills: 0 | Status: DISCONTINUED | OUTPATIENT
Start: 2023-08-13 | End: 2023-08-13

## 2023-08-13 RX ORDER — DEXTROSE 50 % IN WATER 50 %
12.5 SYRINGE (ML) INTRAVENOUS ONCE
Refills: 0 | Status: DISCONTINUED | OUTPATIENT
Start: 2023-08-13 | End: 2023-08-20

## 2023-08-13 RX ORDER — SODIUM CHLORIDE 9 MG/ML
1000 INJECTION, SOLUTION INTRAVENOUS ONCE
Refills: 0 | Status: COMPLETED | OUTPATIENT
Start: 2023-08-13 | End: 2023-08-13

## 2023-08-13 RX ORDER — INSULIN LISPRO 100/ML
VIAL (ML) SUBCUTANEOUS
Refills: 0 | Status: DISCONTINUED | OUTPATIENT
Start: 2023-08-13 | End: 2023-08-14

## 2023-08-13 RX ORDER — ALBUMIN HUMAN 25 %
100 VIAL (ML) INTRAVENOUS EVERY 4 HOURS
Refills: 0 | Status: COMPLETED | OUTPATIENT
Start: 2023-08-13 | End: 2023-08-14

## 2023-08-13 RX ADMIN — Medication 50 MILLILITER(S): at 21:50

## 2023-08-13 RX ADMIN — Medication 1000 MILLIGRAM(S): at 04:18

## 2023-08-13 RX ADMIN — MUPIROCIN 1 APPLICATION(S): 20 OINTMENT TOPICAL at 18:36

## 2023-08-13 RX ADMIN — CEFEPIME 100 MILLIGRAM(S): 1 INJECTION, POWDER, FOR SOLUTION INTRAMUSCULAR; INTRAVENOUS at 02:22

## 2023-08-13 RX ADMIN — SODIUM CHLORIDE 1000 MILLILITER(S): 9 INJECTION, SOLUTION INTRAVENOUS at 04:48

## 2023-08-13 RX ADMIN — PIPERACILLIN AND TAZOBACTAM 200 GRAM(S): 4; .5 INJECTION, POWDER, LYOPHILIZED, FOR SOLUTION INTRAVENOUS at 04:36

## 2023-08-13 RX ADMIN — SODIUM CHLORIDE 1000 MILLILITER(S): 9 INJECTION INTRAMUSCULAR; INTRAVENOUS; SUBCUTANEOUS at 02:19

## 2023-08-13 RX ADMIN — Medication 50 MILLIGRAM(S): at 23:16

## 2023-08-13 RX ADMIN — Medication 125 MEQ/KG/HR: at 14:07

## 2023-08-13 RX ADMIN — PANTOPRAZOLE SODIUM 40 MILLIGRAM(S): 20 TABLET, DELAYED RELEASE ORAL at 11:06

## 2023-08-13 RX ADMIN — CHLORHEXIDINE GLUCONATE 1 APPLICATION(S): 213 SOLUTION TOPICAL at 05:28

## 2023-08-13 RX ADMIN — SODIUM CHLORIDE 125 MILLILITER(S): 9 INJECTION, SOLUTION INTRAVENOUS at 05:29

## 2023-08-13 RX ADMIN — Medication 125 MEQ/KG/HR: at 06:28

## 2023-08-13 RX ADMIN — Medication 5.95 MICROGRAM(S)/KG/MIN: at 17:10

## 2023-08-13 RX ADMIN — HEPARIN SODIUM 5000 UNIT(S): 5000 INJECTION INTRAVENOUS; SUBCUTANEOUS at 13:07

## 2023-08-13 RX ADMIN — Medication 5 MILLIGRAM(S): at 02:21

## 2023-08-13 RX ADMIN — Medication 250 MILLIGRAM(S): at 04:36

## 2023-08-13 RX ADMIN — HEPARIN SODIUM 5000 UNIT(S): 5000 INJECTION INTRAVENOUS; SUBCUTANEOUS at 21:14

## 2023-08-13 RX ADMIN — SODIUM CHLORIDE 250 MILLILITER(S): 9 INJECTION, SOLUTION INTRAVENOUS at 10:32

## 2023-08-13 RX ADMIN — PIPERACILLIN AND TAZOBACTAM 25 GRAM(S): 4; .5 INJECTION, POWDER, LYOPHILIZED, FOR SOLUTION INTRAVENOUS at 13:07

## 2023-08-13 RX ADMIN — AZITHROMYCIN 255 MILLIGRAM(S): 500 TABLET, FILM COATED ORAL at 03:15

## 2023-08-13 RX ADMIN — SODIUM CHLORIDE 250 MILLILITER(S): 9 INJECTION, SOLUTION INTRAVENOUS at 17:04

## 2023-08-13 RX ADMIN — PIPERACILLIN AND TAZOBACTAM 25 GRAM(S): 4; .5 INJECTION, POWDER, LYOPHILIZED, FOR SOLUTION INTRAVENOUS at 08:29

## 2023-08-13 RX ADMIN — PIPERACILLIN AND TAZOBACTAM 25 GRAM(S): 4; .5 INJECTION, POWDER, LYOPHILIZED, FOR SOLUTION INTRAVENOUS at 21:15

## 2023-08-13 RX ADMIN — Medication 50 MILLIGRAM(S): at 17:05

## 2023-08-13 RX ADMIN — Medication 400 MILLIGRAM(S): at 04:14

## 2023-08-13 RX ADMIN — Medication 5.95 MICROGRAM(S)/KG/MIN: at 04:48

## 2023-08-13 RX ADMIN — Medication 2: at 08:30

## 2023-08-13 RX ADMIN — Medication 2: at 11:07

## 2023-08-13 RX ADMIN — CEFEPIME 1000 MILLIGRAM(S): 1 INJECTION, POWDER, FOR SOLUTION INTRAMUSCULAR; INTRAVENOUS at 02:55

## 2023-08-13 NOTE — CONSULT NOTE ADULT - SUBJECTIVE AND OBJECTIVE BOX
Patient is a 73y old  Male who presents with a chief complaint of   HPI:    Allergies: No Known Allergies    PAST MEDICAL & SURGICAL HISTORY:  TBI (traumatic brain injury)      HTN (hypertension)      Atrial fibrillation      Peripheral neuropathy      Major depression      HLD (hyperlipidemia)      CAD (coronary artery disease)      BPH (benign prostatic hyperplasia)      Pneumonia due to COVID-19 virus  June 2022      Hemorrhage in the brain      H/O craniotomy        FAMILY HISTORY:  FH: HTN (hypertension) (Father, Mother, Sibling)    Family history of bone cancer (Sibling)    FH: Alzheimers disease (Sibling)      SOCIAL HISTORY:    Home Medications:    Review of Systems:  Pertinent positives noted above, all other ROS negative x10 system    T(F): 97.6 (08-13-23 @ 03:49), Max: 98.7 (08-12-23 @ 20:19)  HR: 107 (08-13-23 @ 04:20) (78 - 131)  BP: 73/47 (08-13-23 @ 04:20) (73/47 - 100/63)  RR: 19 (08-13-23 @ 04:20) (16 - 23)  SpO2: 99% (08-13-23 @ 04:20)  Wt(kg): --    CAPILLARY BLOOD GLUCOSE      POCT Blood Glucose.: 78 mg/dL (12 Aug 2023 20:54)    I&O's Summary    12 Aug 2023 07:01  -  13 Aug 2023 04:29  --------------------------------------------------------  IN: 100 mL / OUT: 0 mL / NET: 100 mL        Physical Exam:     Gen:  Neuro:  HEENT:  CVS:  Resp:  Abd:  Ext:  Skin:    Meds:  piperacillin/tazobactam IVPB. 3.375 Gram(s) IV Intermittent Once  piperacillin/tazobactam IVPB.. 3.375 Gram(s) IV Intermittent Once  vancomycin  IVPB. 1000 milliGRAM(s) IV Intermittent once    norepinephrine Infusion 0.05 MICROgram(s)/kG/Min IV Continuous <Continuous>                          lactated ringers Bolus 1000 milliLiter(s) IV Bolus once  lactated ringers. 1000 milliLiter(s) IV Continuous <Continuous>        chlorhexidine 4% Liquid 1 Application(s) Topical <User Schedule>                              12.9   45.52 )-----------( 269      ( 12 Aug 2023 21:50 )             42.6     Bands 8.0    08-12    143  |  106  |  31<H>  ----------------------------<  66<L>  4.5   |  11<L>  |  1.50<H>    Ca    8.5      12 Aug 2023 21:50  Mg     1.8     08-12    TPro  6.1  /  Alb  3.0<L>  /  TBili  0.5  /  DBili  x   /  AST  13<L>  /  ALT  18  /  AlkPhos  92  08-12    Lactate 14.4           08-12 @ 21:50      CARDIAC MARKERS ( 12 Aug 2023 21:50 )  x     / x     / x     / x     / 1.0 ng/mL        Urinalysis Basic - ( 12 Aug 2023 21:50 )    Color: x / Appearance: x / SG: x / pH: x  Gluc: 66 mg/dL / Ketone: x  / Bili: x / Urobili: x   Blood: x / Protein: x / Nitrite: x   Leuk Esterase: x / RBC: x / WBC x   Sq Epi: x / Non Sq Epi: x / Bacteria: x              Radiology: ***    Problems    Assessment/Plan:    Time spent on this patient encounter, which includes documenting this note in the electronic medical record, was 42 minutes including assessing the presenting problems with associated risks, reviewing the medical record to prepare for the encounter, and meeting face to face with the patient to obtain additional history.  I have also performed an appropriate physical exam, made interventions listed and ordered and interpreted appropriate diagnostic studies as documented.     To improve communication and patient safety, I have coordinated care with the multidisciplinary team including the bedside nurse, appropriate attending of record and consultants as needed.     Patient is a 73y old  Male who presents with a chief complaint of     HPI:  History of Present Illness:   74 y/o male with PMHx of DM, HTN, HLD, Afib on Eliquis, TBI s/p R SDH evac in 9/2021, traumatic SDH 10/22 presented to ED with increased lethargy. Accordin to wife at bedside patient has not been himself recently. At baseline patient is bedbound to wheelchair. Noted patient to be constipated so  she had been giving laxatives. Earlier before ED admission patient vomitted and had diarrhea. EMS placed patient on 100% NRB for hypoxia. Recent fall per wife 2 weeks ago where he hit his head and was taken to [facility] - was cleared by their team and sent home same day    At bedside patient is awake and alert. Nods and answers to questions appropriately. Denies any acute pain at this time      Allergies: No Known Allergies    PAST MEDICAL & SURGICAL HISTORY:  TBI (traumatic brain injury)      HTN (hypertension)      Atrial fibrillation      Peripheral neuropathy      Major depression      HLD (hyperlipidemia)      CAD (coronary artery disease)      BPH (benign prostatic hyperplasia)      Pneumonia due to COVID-19 virus  June 2022      Hemorrhage in the brain      H/O craniotomy        FAMILY HISTORY:  FH: HTN (hypertension) (Father, Mother, Sibling)    Family history of bone cancer (Sibling)    FH: Alzheimers disease (Sibling)      SOCIAL HISTORY:    Home Medications:    Review of Systems:  Pertinent positives noted above, all other ROS negative x10 system    T(F): 97.6 (08-13-23 @ 03:49), Max: 98.7 (08-12-23 @ 20:19)  HR: 107 (08-13-23 @ 04:20) (78 - 131)  BP: 73/47 (08-13-23 @ 04:20) (73/47 - 100/63)  RR: 19 (08-13-23 @ 04:20) (16 - 23)  SpO2: 99% (08-13-23 @ 04:20)  Wt(kg): --    CAPILLARY BLOOD GLUCOSE      POCT Blood Glucose.: 78 mg/dL (12 Aug 2023 20:54)    I&O's Summary    12 Aug 2023 07:01  -  13 Aug 2023 04:29  --------------------------------------------------------  IN: 100 mL / OUT: 0 mL / NET: 100 mL        Physical Exam:     Gen: critically ill appearing man  Neuro: opens eyes, follows simple commands, moves extremities   CVS: +S1S2  Resp: Coarse bilatearlly  Abd: soft NT ND  Ext: warm dry no edema. Contracted  Skin: well perfused    Meds:  piperacillin/tazobactam IVPB. 3.375 Gram(s) IV Intermittent Once  piperacillin/tazobactam IVPB.. 3.375 Gram(s) IV Intermittent Once  vancomycin  IVPB. 1000 milliGRAM(s) IV Intermittent once    norepinephrine Infusion 0.05 MICROgram(s)/kG/Min IV Continuous <Continuous>                          lactated ringers Bolus 1000 milliLiter(s) IV Bolus once  lactated ringers. 1000 milliLiter(s) IV Continuous <Continuous>        chlorhexidine 4% Liquid 1 Application(s) Topical <User Schedule>                              12.9   45.52 )-----------( 269      ( 12 Aug 2023 21:50 )             42.6     Bands 8.0    08-12    143  |  106  |  31<H>  ----------------------------<  66<L>  4.5   |  11<L>  |  1.50<H>    Ca    8.5      12 Aug 2023 21:50  Mg     1.8     08-12    TPro  6.1  /  Alb  3.0<L>  /  TBili  0.5  /  DBili  x   /  AST  13<L>  /  ALT  18  /  AlkPhos  92  08-12    Lactate 14.4           08-12 @ 21:50      CARDIAC MARKERS ( 12 Aug 2023 21:50 )  x     / x     / x     / x     / 1.0 ng/mL        Urinalysis Basic - ( 12 Aug 2023 21:50 )    Color: x / Appearance: x / SG: x / pH: x  Gluc: 66 mg/dL / Ketone: x  / Bili: x / Urobili: x   Blood: x / Protein: x / Nitrite: x   Leuk Esterase: x / RBC: x / WBC x   Sq Epi: x / Non Sq Epi: x / Bacteria: x              Radiology:  < from: CT Abdomen and Pelvis No Cont (08.12.23 @ 22:38) >    ACC: [id number] EXAM:  CT ABDOMEN AND PELVIS   ORDERED BY: SHONA VILLANUEVA     ACC: [id number] EXAM:  CT CHEST   ORDERED BY: SHONA VILLANUEVA     PROCEDURE DATE:  08/12/2023          INTERPRETATION:  CLINICAL INFORMATION: Shortness of breath andvomiting.    COMPARISON: CT chest 1/7/2023 and CT abdomen pelvis 12/1/2022    CONTRAST/COMPLICATIONS:  IV Contrast: NONE  Oral Contrast: NONE  Complications: None reported at time of study completion    PROCEDURE:  CT of the Chest, Abdomen and Pelviswas performed.  Sagittal and coronal reformats were performed.    FINDINGS:  CHEST:  LUNGS AND LARGE AIRWAYS: Layering secretions in the distal trachea..   Groundglass opacities in the right upper, right middle, and bilateral   lower lobes.  PLEURA: No pleural effusion.  VESSELS: Atherosclerotic changes of the aorta and coronary arteries.  HEART: Cardiomegaly. Trace pericardial effusion.  MEDIASTINUM AND ALYSSA: No lymphadenopathy.  CHEST WALL AND LOWER NECK: Bilateral gynecomastia.    ABDOMEN AND PELVIS:  LIVER: Within normal limits.  BILE DUCTS: Normal caliber.  GALLBLADDER: Within normal limits.  SPLEEN: Within normal limits.  PANCREAS: Within normal limits.  ADRENALS: Within normal limits.  KIDNEYS/URETERS: Bilateral parapelvic cysts. No nephrolithiasis or   hydronephrosis.    BLADDER: Within normal limits.  REPRODUCTIVE ORGANS: Prostate is enlarged.    BOWEL: Thickening versus underdistention of the distal sigmoid colon.   Moderate fecal load No bowel obstruction. Appendix is normal.  PERITONEUM: No ascites.  VESSELS: Atherosclerotic changes.  RETROPERITONEUM/LYMPH NODES: No lymphadenopathy.  ABDOMINAL WALL: Bilateral fat-containing inguinal hernias.  BONES: Degenerative changes.    IMPRESSION:    Bilateral pulmonary groundglass opacities likely reflects atypical   pneumonia.  Thickening versus underdistention of the distal sigmoid colon. Correlate   for symptoms of colitis.        --- End of Report ---            ALANIS YAP MD; Attending Radiologist  This document has beenelectronically signed. Aug 12 2023 11:43PM    < end of copied text >      Problems  -Septic shock  -LATOYA  -Metabolic acidosis  -Pneumonia  -Chronic SDH      Assessment/Plan:    Neuro: Known hx of TBI. Initial CTH stable SDH. Concern for subarachnoid hemorrhage; discussed w/ E-ICU. Repeat CTH ordered demonstrated to be artifact. No need for transfer  CV: Profound lactic acidosis. Recieved sepsis IVF bolus 2.1L Started on Levophed gtt for hypotension. Titrating for MAP >65. Additional IVF bolus ordered. Trend Lactate  Resp: 100% NRB for respiratory support. CT chest w/ bilateral groundglass opacities.   GI: NPO at this time. Protonix added for stress prophylaxis  Renal: LATOYA likely in setting of ATN/Sepsis. Metabolic acidosis secondary to lactic acidosis. Repeat BMP after hydration  ID: Pan cultured. Empiric abx coverage w/ Zosyn and Vanco. MRSA and RVP sent  Heme: Holding Eliquis at this time  Endo: Start Lispro SS coverage    Wife at beside updated. All questions answered and all concerns addressed. We discussed goals of care status for which she was not able to give definitive answer    Discussed case w/ E-ICU attending. Admit to ICU    Critical care time spent on this patient encounter, which includes documenting this note in the electronic medical record, was 55 minutes including assessing the presenting problems with associated risks, reviewing the medical record to prepare for the encounter, and meeting face to face with the patient to obtain additional history.  I have also performed an appropriate physical exam, made interventions listed and ordered and interpreted appropriate diagnostic studies as documented.     To improve communication and patient safety, I have coordinated care with the multidisciplinary team including the bedside nurse, appropriate attending of record and consultants as needed.

## 2023-08-13 NOTE — H&P ADULT - NSHPREVIEWOFSYSTEMS_GEN_ALL_CORE
CONSTITUTIONAL: +diffuse pain, denies fever, chills, fatigue, weakness  HEENT: denies blurred vision, sore throat  SKIN: +scattered chronic skin lesions, denies new rash  CARDIOVASCULAR: denies chest pain, chest pressure, palpitations  RESPIRATORY: + shortness of breath, denies sputum production  GASTROINTESTINAL: + nausea, +vomiting, +diarrhea, +abdominal pain  GENITOURINARY: denies dysuria, discharge  NEUROLOGICAL: +headache, denies numbness, focal weakness  MUSCULOSKELETAL: denies new joint pain, muscle aches  HEMATOLOGIC: denies gross bleeding, bruising

## 2023-08-13 NOTE — PROGRESS NOTE ADULT - SUBJECTIVE AND OBJECTIVE BOX
Patient is a 73y old  Male who presents with a chief complaint of Sepsis (13 Aug 2023 11:49)      INTERVAL HPI/OVERNIGHT EVENTS: Pt admitted to ICU overnight for septic shock 2/2 PNA, started on pressor support and IV abx. Patient seen and examined at bedside. Unable to obtain ROS as pt is nonverbal at baseline.     MEDICATIONS  (STANDING):  atorvastatin 40 milliGRAM(s) Oral at bedtime  azithromycin  IVPB 500 milliGRAM(s) IV Intermittent every 24 hours  dextrose 5%. 1000 milliLiter(s) (50 mL/Hr) IV Continuous <Continuous>  dextrose 5%. 1000 milliLiter(s) (100 mL/Hr) IV Continuous <Continuous>  dextrose 50% Injectable 12.5 Gram(s) IV Push once  dextrose 50% Injectable 25 Gram(s) IV Push once  dextrose 50% Injectable 25 Gram(s) IV Push once  glucagon  Injectable 1 milliGRAM(s) IntraMuscular once  heparin   Injectable 5000 Unit(s) SubCutaneous every 8 hours  insulin lispro (ADMELOG) corrective regimen sliding scale   SubCutaneous three times a day before meals  mirtazapine 7.5 milliGRAM(s) Oral daily  norepinephrine Infusion 0.05 MICROgram(s)/kG/Min (5.95 mL/Hr) IV Continuous <Continuous>  pantoprazole  Injectable 40 milliGRAM(s) IV Push daily  piperacillin/tazobactam IVPB.. 3.375 Gram(s) IV Intermittent every 8 hours  sodium bicarbonate  Infusion 0.103 mEq/kG/Hr (125 mL/Hr) IV Continuous <Continuous>    MEDICATIONS  (PRN):  dextrose Oral Gel 15 Gram(s) Oral once PRN Blood Glucose LESS THAN 70 milliGRAM(s)/deciliter  sodium chloride 0.9% lock flush 10 milliLiter(s) IV Push every 1 hour PRN Pre/post blood products, medications, blood draw, and to maintain line patency      Allergies    No Known Allergies    Intolerances        REVIEW OF SYSTEMS:  Unable to obtain as per HPI    Vital Signs Last 24 Hrs  T(C): 36.8 (13 Aug 2023 11:45), Max: 37.1 (12 Aug 2023 20:19)  T(F): 98.2 (13 Aug 2023 11:45), Max: 98.7 (12 Aug 2023 20:19)  HR: 103 (13 Aug 2023 13:15) (78 - 131)  BP: 107/57 (13 Aug 2023 13:15) (72/51 - 123/67)  BP(mean): 79 (13 Aug 2023 13:15) (55 - 83)  RR: 17 (13 Aug 2023 13:15) (12 - 32)  SpO2: 95% (13 Aug 2023 13:15) (83% - 99%)    Parameters below as of 13 Aug 2023 13:15  Patient On (Oxygen Delivery Method): nasal cannula  O2 Flow (L/min): 6      PHYSICAL EXAM:  GENERAL: lethargic, chronically-ill appearing  HEENT:  anicteric, dry mucous membranes  CHEST/LUNG: coarse breath sounds b/l  HEART:  RRR, S1, S2  ABDOMEN: soft, nontender, nondistended  EXTREMITIES: no edema or cyanosis  NERVOUS SYSTEM: unable to answer questions or follows commands appropriately    LABS:                        10.9   50.24 )-----------( 347      ( 13 Aug 2023 04:26 )             36.5     CBC Full  -  ( 13 Aug 2023 04:26 )  WBC Count : 50.24 K/uL  Hemoglobin : 10.9 g/dL  Hematocrit : 36.5 %  Platelet Count - Automated : 347 K/uL  Mean Cell Volume : 83.0 fl  Mean Cell Hemoglobin : 24.8 pg  Mean Cell Hemoglobin Concentration : 29.9 gm/dL  Auto Neutrophil # : 45.95 K/uL  Auto Lymphocyte # : 1.25 K/uL  Auto Monocyte # : 1.88 K/uL  Auto Eosinophil # : 0.10 K/uL  Auto Basophil # : 0.17 K/uL  Auto Neutrophil % : 91.5 %  Auto Lymphocyte % : 2.5 %  Auto Monocyte % : 3.7 %  Auto Eosinophil % : 0.2 %  Auto Basophil % : 0.3 %    13 Aug 2023 09:33    137    |  103    |  39     ----------------------------<  253    4.4     |  14     |  1.80     Ca    7.7        13 Aug 2023 09:33  Phos  5.4       13 Aug 2023 09:33  Mg     1.6       13 Aug 2023 09:33    TPro  5.4    /  Alb  2.5    /  TBili  0.4    /  DBili  x      /  AST  13     /  ALT  15     /  AlkPhos  71     13 Aug 2023 09:33    PT/INR - ( 13 Aug 2023 04:26 )   PT: 21.5 sec;   INR: 1.87 ratio         PTT - ( 13 Aug 2023 04:26 )  PTT:34.8 sec  Urinalysis Basic - ( 13 Aug 2023 09:37 )    Color: Dark Yellow / Appearance: Cloudy / S.016 / pH: x  Gluc: x / Ketone: Trace mg/dL  / Bili: Negative / Urobili: 1.0 mg/dL   Blood: x / Protein: 300 mg/dL / Nitrite: Negative   Leuk Esterase: Small / RBC: 2 /HPF / WBC 8 /HPF   Sq Epi: x / Non Sq Epi: x / Bacteria: Occasional /HPF      CAPILLARY BLOOD GLUCOSE      POCT Blood Glucose.: 220 mg/dL (13 Aug 2023 11:05)  POCT Blood Glucose.: 226 mg/dL (13 Aug 2023 08:28)  POCT Blood Glucose.: 161 mg/dL (13 Aug 2023 05:33)  POCT Blood Glucose.: 78 mg/dL (12 Aug 2023 20:54)          RADIOLOGY & ADDITIONAL TESTS:    Personally reviewed.     Consultant(s) Notes Reviewed:  [x] YES  [ ] NO     Patient is a 73y old  Male who presents with a chief complaint of Sepsis (13 Aug 2023 11:49)      INTERVAL HPI/OVERNIGHT EVENTS: Pt admitted to ICU overnight for septic shock 2/2 PNA and metabolic acidosis, started on pressor support, IV abx and bicarb gtt. Patient seen and examined at bedside. Unable to obtain ROS as pt is nonverbal at baseline.     MEDICATIONS  (STANDING):  atorvastatin 40 milliGRAM(s) Oral at bedtime  azithromycin  IVPB 500 milliGRAM(s) IV Intermittent every 24 hours  dextrose 5%. 1000 milliLiter(s) (50 mL/Hr) IV Continuous <Continuous>  dextrose 5%. 1000 milliLiter(s) (100 mL/Hr) IV Continuous <Continuous>  dextrose 50% Injectable 12.5 Gram(s) IV Push once  dextrose 50% Injectable 25 Gram(s) IV Push once  dextrose 50% Injectable 25 Gram(s) IV Push once  glucagon  Injectable 1 milliGRAM(s) IntraMuscular once  heparin   Injectable 5000 Unit(s) SubCutaneous every 8 hours  insulin lispro (ADMELOG) corrective regimen sliding scale   SubCutaneous three times a day before meals  mirtazapine 7.5 milliGRAM(s) Oral daily  norepinephrine Infusion 0.05 MICROgram(s)/kG/Min (5.95 mL/Hr) IV Continuous <Continuous>  pantoprazole  Injectable 40 milliGRAM(s) IV Push daily  piperacillin/tazobactam IVPB.. 3.375 Gram(s) IV Intermittent every 8 hours  sodium bicarbonate  Infusion 0.103 mEq/kG/Hr (125 mL/Hr) IV Continuous <Continuous>    MEDICATIONS  (PRN):  dextrose Oral Gel 15 Gram(s) Oral once PRN Blood Glucose LESS THAN 70 milliGRAM(s)/deciliter  sodium chloride 0.9% lock flush 10 milliLiter(s) IV Push every 1 hour PRN Pre/post blood products, medications, blood draw, and to maintain line patency      Allergies    No Known Allergies    Intolerances        REVIEW OF SYSTEMS:  Unable to obtain as per HPI    Vital Signs Last 24 Hrs  T(C): 36.8 (13 Aug 2023 11:45), Max: 37.1 (12 Aug 2023 20:19)  T(F): 98.2 (13 Aug 2023 11:45), Max: 98.7 (12 Aug 2023 20:19)  HR: 103 (13 Aug 2023 13:15) (78 - 131)  BP: 107/57 (13 Aug 2023 13:15) (72/51 - 123/67)  BP(mean): 79 (13 Aug 2023 13:15) (55 - 83)  RR: 17 (13 Aug 2023 13:15) (12 - 32)  SpO2: 95% (13 Aug 2023 13:15) (83% - 99%)    Parameters below as of 13 Aug 2023 13:15  Patient On (Oxygen Delivery Method): nasal cannula  O2 Flow (L/min): 6      PHYSICAL EXAM:  GENERAL: lethargic, chronically-ill appearing  HEENT:  anicteric, dry mucous membranes  CHEST/LUNG: coarse breath sounds b/l  HEART:  RRR, S1, S2  ABDOMEN: soft, nontender, nondistended  EXTREMITIES: no edema or cyanosis  NERVOUS SYSTEM: unable to answer questions or follows commands appropriately    LABS:                        10.9   50.24 )-----------( 347      ( 13 Aug 2023 04:26 )             36.5     CBC Full  -  ( 13 Aug 2023 04:26 )  WBC Count : 50.24 K/uL  Hemoglobin : 10.9 g/dL  Hematocrit : 36.5 %  Platelet Count - Automated : 347 K/uL  Mean Cell Volume : 83.0 fl  Mean Cell Hemoglobin : 24.8 pg  Mean Cell Hemoglobin Concentration : 29.9 gm/dL  Auto Neutrophil # : 45.95 K/uL  Auto Lymphocyte # : 1.25 K/uL  Auto Monocyte # : 1.88 K/uL  Auto Eosinophil # : 0.10 K/uL  Auto Basophil # : 0.17 K/uL  Auto Neutrophil % : 91.5 %  Auto Lymphocyte % : 2.5 %  Auto Monocyte % : 3.7 %  Auto Eosinophil % : 0.2 %  Auto Basophil % : 0.3 %    13 Aug 2023 09:33    137    |  103    |  39     ----------------------------<  253    4.4     |  14     |  1.80     Ca    7.7        13 Aug 2023 09:33  Phos  5.4       13 Aug 2023 09:33  Mg     1.6       13 Aug 2023 09:33    TPro  5.4    /  Alb  2.5    /  TBili  0.4    /  DBili  x      /  AST  13     /  ALT  15     /  AlkPhos  71     13 Aug 2023 09:33    PT/INR - ( 13 Aug 2023 04:26 )   PT: 21.5 sec;   INR: 1.87 ratio         PTT - ( 13 Aug 2023 04:26 )  PTT:34.8 sec  Urinalysis Basic - ( 13 Aug 2023 09:37 )    Color: Dark Yellow / Appearance: Cloudy / S.016 / pH: x  Gluc: x / Ketone: Trace mg/dL  / Bili: Negative / Urobili: 1.0 mg/dL   Blood: x / Protein: 300 mg/dL / Nitrite: Negative   Leuk Esterase: Small / RBC: 2 /HPF / WBC 8 /HPF   Sq Epi: x / Non Sq Epi: x / Bacteria: Occasional /HPF      CAPILLARY BLOOD GLUCOSE      POCT Blood Glucose.: 220 mg/dL (13 Aug 2023 11:05)  POCT Blood Glucose.: 226 mg/dL (13 Aug 2023 08:28)  POCT Blood Glucose.: 161 mg/dL (13 Aug 2023 05:33)  POCT Blood Glucose.: 78 mg/dL (12 Aug 2023 20:54)          RADIOLOGY & ADDITIONAL TESTS:    Personally reviewed.     Consultant(s) Notes Reviewed:  [x] YES  [ ] NO

## 2023-08-13 NOTE — H&P ADULT - CONVERSATION DETAILS
Discussed current diagnosis and patient's pre-established Advanced Directive. Wife state patient is DNR with trial of intubation. She only wants intubation if it is "temporary" and only if it is to correct respiratory failure. Wife will bring home copy of AD upon next visit. Discussed current diagnosis and patient's pre-established Advanced Directive. Wife states patient is DNR with trial of intubation. She only wants intubation if it is "temporary" and only if it is to correct respiratory failure. Wife will bring home copy of AD upon next visit. [facility] form filled out and placed in chart. spent 16 min discussing with wife his diagnosis, prognosis, treatment plan and events preceding to his hospitalization.

## 2023-08-13 NOTE — H&P ADULT - NSHPPHYSICALEXAM_GEN_ALL_CORE
T(C): 36.4 (08-13-23 @ 03:49), Max: 37.1 (08-12-23 @ 20:19)  HR: 107 (08-13-23 @ 04:20) (78 - 131)  BP: 73/47 (08-13-23 @ 04:20) (73/47 - 100/63)  RR: 19 (08-13-23 @ 04:20) (16 - 23)  SpO2: 99% (08-13-23 @ 04:20) (83% - 99%)    GENERAL: +chronically ill-appearing, NAD  EYES: sclera clear, no exudates  ENMT: +dry mucous membrane, oropharynx clear without erythema, no exudates  NECK: supple, soft, no thyromegaly noted  LUNGS: +crackles at lung bases bilaterally, good air entry bilaterally, symmetric breath sounds, no wheezing  HEART: soft S1/S2, +Afib, +tachycardic, no murmurs noted, no lower extremity edema  GASTROINTESTINAL: abdomen is soft, nontender, nondistended, normoactive bowel sounds, no palpable masses  INTEGUMENT: +numerous dry/flaky skin lesions throughout, +poor skin hygiene  MUSCULOSKELETAL: no clubbing or cyanosis, no obvious deformity  NEUROLOGIC: AOx3, +patient is largely nonverbal but utters occasional words. Able to communicate via yes/no questions and blinking or shaking head  PSYCHIATRIC: unable to assess  HEME/LYMPH: no palpable supraclavicular nodules, no obvious ecchymosis or petechiae T(C): 36.4 (08-13-23 @ 03:49), Max: 37.1 (08-12-23 @ 20:19)  HR: 107 (08-13-23 @ 04:20) (78 - 131)  BP: 73/47 (08-13-23 @ 04:20) (73/47 - 100/63)  RR: 19 (08-13-23 @ 04:20) (16 - 23)  SpO2: 99% (08-13-23 @ 04:20) (83% - 99%)    GENERAL: +chronically ill-appearing, NAD  EYES: sclera clear, no exudates  ENMT: +dry mucous membrane, oropharynx clear without erythema, no exudates  NECK: supple, soft, no thyromegaly noted  LUNGS: +crackles at lung bases bilaterally, good air entry bilaterally, symmetric breath sounds, no wheezing  HEART: soft S1/S2, +Afib, +tachycardic, no murmurs noted, no lower extremity edema  GASTROINTESTINAL: abdomen is soft, nontender, nondistended, normoactive bowel sounds, no palpable masses  INTEGUMENT: +numerous dry/flaky skin lesions throughout, +poor skin hygiene  MUSCULOSKELETAL: R arm contracture, no clubbing or cyanosis  NEUROLOGIC: AOx3, +patient is largely nonverbal but utters occasional words. Able to communicate via yes/no questions and blinking or shaking head  PSYCHIATRIC: unable to assess  HEME/LYMPH: no palpable supraclavicular nodules, no obvious ecchymosis or petechiae

## 2023-08-13 NOTE — PATIENT PROFILE ADULT - FALL HARM RISK - HARM RISK INTERVENTIONS
Assistance with ambulation/Assistance OOB with selected safe patient handling equipment/Communicate Risk of Fall with Harm to all staff/Discuss with provider need for PT consult/Monitor gait and stability/Provide patient with walking aids - walker, cane, crutches/Reinforce activity limits and safety measures with patient and family/Review medications for side effects contributing to fall risk/Sit up slowly, dangle for a short time, stand at bedside before walking/Tailored Fall Risk Interventions/Toileting schedule using arm’s reach rule for commode and bathroom/Visual Cue: Yellow wristband and red socks/Bed in lowest position, wheels locked, appropriate side rails in place/Call bell, personal items and telephone in reach/Instruct patient to call for assistance before getting out of bed or chair/Non-slip footwear when patient is out of bed/[place] to call system/Physically safe environment - no spills, clutter or unnecessary equipment/Purposeful Proactive Rounding/Room/bathroom lighting operational, light cord in reach

## 2023-08-13 NOTE — H&P ADULT - HISTORY OF PRESENT ILLNESS
72 y/o M with PMHx DM2, HTN, HLD A-fib (on Eliquis), TBI (s/p SDH with EVAC on 9/2021), and traumatic subdural hemorrhage in 10/2022, BIBEMS with nausea and vomiting. Patient had     [facility]    diet:   S/S eval:     Vitals: T 98.7F, HR 78 --> 131 (Afib), /63 --> 76/52, RR 16, SpO2 98% NRB  Labs: WBC 45, Lactate 14, CO2 11, BUN 31, Cr 1.5, Glucose 66, GFR 49, pro-BNP 1654  VBG: pH 7.2, pCO2 23, pO2 129, HCO3 9  EKG: Afib @ 98bpm, RVH    CT Head:   Persistent posterior parafalcine subdural hematoma identified measuring   approximately 4 mm in thickness, without interval change as compared to   prior exam from 6 hours ago. No new mass effect, intraventricular   extension, or new sites of intracranial hemorrhage identified.    Previously suspected subarachnoid hemorrhage at the right frontal high   convexity is favored to represent streak artifact.    Multifocal chronic infarcts present, superimposed upon stable background   white matter hypoattenuation.    CT A/P:   Bilateral pulmonary groundglass opacities likely reflects atypical   pneumonia.  Thickening versus underdistention of the distal sigmoid colon. Correlate   for symptoms of colitis.     72 y/o M with PMHx DM2, HTN, HLD A-fib (on Eliquis), TBI (s/p SDH with EVAC on 9/2021), and traumatic subdural hemorrhage in 10/2022, BIBEMS with hypoxia, nausea and vomiting. Patient was constipated for almost 2 weeks before wife gave him Miralax. He subsequently developed diarrhea for several days. This afternoon patient vomited and was more lethargic than normal. The home health aide was caring for the patient until the wife returned around 5:30pm and found him lying down and difficult to arouse. The wife called EMS. Currently, patient reports SOB, diffuse pain, and diarrhea. He is mostly non-verbal but is able to respond to yes/no questions when coached or with eye-blinking ques. The wife denies any sick contacts or medication changes. No BMs since admission.    Vitals: T 98.7F, HR 78 --> 131 (Afib), /63 --> 76/52, RR 16, SpO2 98% NRB  Labs: WBC 45, Lactate 14, CO2 11, BUN 31, Cr 1.5, Glucose 66, GFR 49, pro-BNP 1654  VBG: pH 7.2, pCO2 23, pO2 129, HCO3 9  EKG: Afib @ 98bpm, RVH    CT Head:   Persistent posterior parafalcine subdural hematoma identified measuring   approximately 4 mm in thickness, without interval change as compared to   prior exam from 6 hours ago. No new mass effect, intraventricular   extension, or new sites of intracranial hemorrhage identified.    Previously suspected subarachnoid hemorrhage at the right frontal high   convexity is favored to represent streak artifact.    Multifocal chronic infarcts present, superimposed upon stable background   white matter hypoattenuation.    CT A/P:   Bilateral pulmonary groundglass opacities likely reflects atypical   pneumonia.  Thickening versus underdistention of the distal sigmoid colon. Correlate   for symptoms of colitis.

## 2023-08-13 NOTE — H&P ADULT - ATTENDING COMMENTS
74 y/o M with PMHx DM2, HTN, HLD A-fib (on Eliquis), TBI (s/p SDH with EVAC on 9/2021), and traumatic subdural hemorrhage in 10/2022, BIBEMS with nausea and vomiting. Admitted for sepsis and AHRF, and lactic acidosis    admit to icu. iv abx. pressor support. trend lactate, wbc

## 2023-08-13 NOTE — PROVIDER CONTACT NOTE (EICU) - RECOMMENDATIONS
Plan:   -agree with ICU admission  -maintain SBP ; MAP>60; soft BP noted, hold home Diltiazem  -cont empiric Abx, Cx sent;  - monitor resp status; renal function and e-lytes  - NPO for now, IV fluids  - prior (June 2023) [facility] discussion - DNR status, trial of intubation; recommended to confirm.

## 2023-08-13 NOTE — PROVIDER CONTACT NOTE (EICU) - ASSESSMENT
72 y/o male with sepsis due to PNA, possible colitis.  Known small acute interhemispheric SDH, spontaneous, radiographically improving (was admitted in June 2023).  Concern for R frontal convexity SAH - repeat CTH, discussion with Radiology, finding determined as artifact.  Wife reports a fall 2 weeks ago.  LATOYA, suspected ATN and pre-renal. HAGMA likely due to lactatemia, impaired renal function.  PMH of Afib and LLE DVT, on Eliquis at home. H/o multiple spont. intracranial hemorrhagic events.  Also, h/o DM, HTN, HLD, remote TBI s/p R SDH evac in 9/2021.   Compromised baseline functional status - wheelchair, sacral decub ulcer stage 3, upper extr contraction.

## 2023-08-13 NOTE — H&P ADULT - PROBLEM SELECTOR PLAN 3
Patient with chronic atrial fibrillation  - Tachycardic on admission, progressed to Afib with RVR likely 2/2 dehydration, hypotension, acute illness  - s/p Cardizem IVP 5mg   - Rate Control: Diltiazem  - Anticoagulation: Eliquis  - Monitor and replete electrolytes  - Recommend continuing Afib regimen

## 2023-08-13 NOTE — ED ADULT NURSE NOTE - NSFALLHARMRISKINTERV_ED_ALL_ED
Assistance OOB with selected safe patient handling equipment if applicable/Communicate risk of Fall with Harm to all staff, patient, and family/Monitor gait and stability/Provide patient with walking aids/Provide visual cue: red socks, yellow wristband, yellow gown, etc/Reinforce activity limits and safety measures with patient and family/Bed in lowest position, wheels locked, appropriate side rails in place/Call bell, personal items and telephone in reach/Instruct patient to call for assistance before getting out of bed/chair/stretcher/Non-slip footwear applied when patient is off stretcher/[place] to call system/Physically safe environment - no spills, clutter or unnecessary equipment/Purposeful Proactive Rounding/Room/bathroom lighting operational, light cord in reach

## 2023-08-13 NOTE — PROGRESS NOTE ADULT - PROBLEM SELECTOR PLAN 1
- CT Chest: Bilateral pulmonary groundglass opacities likely reflects atypical pneumonia.  - S/p Vanc, Zosyn, Cefepime, Azithromycin in ED.   - Continue IV Zosyn and IV Azithro  - CT A/P concerning for colitis  - On Levophed gtt for septic shock  - Cont supp O2 to maintain O2 sat >92%  - F/u blood and urine cx  - Further management per ICU

## 2023-08-13 NOTE — PROGRESS NOTE ADULT - PROBLEM SELECTOR PLAN 6
Well controlled, on Metformin 1000mg BID. Not on home insulin  - P/w lactic acidosis in setting of acute illness, likely component of Metformin side effect as well  - A1C: 5.5 in Jun 2023  - Low ISS  - Regular finger sticks  - Consistent carb diet  - Hypoglycemic protocol.  - Further management per ICU

## 2023-08-13 NOTE — PROGRESS NOTE ADULT - PROBLEM SELECTOR PLAN 3
Patient with chronic atrial fibrillation  - Tachycardic on admission, progressed to Afib with RVR likely 2/2 dehydration, hypotension, acute illness  - s/p Cardizem IVP 5mg on admission with improved rates  - Hold home Diltiazem and Eliquis

## 2023-08-13 NOTE — H&P ADULT - PROBLEM SELECTOR PLAN 1
P/w N/V/D, AMS, AHRF likely 2/2 multifocal PNA, w/ possible colitis  - Tachycardic, hypoxic, elevated WBC (45), elevated lactate (14). Meets sepsis criteria  - S/p Vanc, Zosyn, Cefepime, Azithromycin in ED. Recommend cont Zosyn   - Trend lactate, and WBC. s/p 3L IVF in ED  - CT A/P: colitis  - VBG: pH 7.2, pCO2 23, pO2 129, HCO3 9  - Cont supp O2 to maintain O2 sat >92%  - F/u blood and urine cx  - Recommend ID and Pulm consults  - Further management per ICU P/w N/V/D, AMS, AHRF likely 2/2 multifocal PNA, w/ possible colitis  - Tachycardic, hypoxic, elevated WBC (45), elevated lactate (14). Meets sepsis criteria  - CT Chest: Bilateral pulmonary groundglass opacities likely reflects atypical pneumonia.  - S/p Vanc, Zosyn, Cefepime, Azithromycin in ED. Recommend cont Zosyn   - Trend lactate, and WBC. s/p 3L IVF in ED  - CT A/P: colitis  - VBG: pH 7.2, pCO2 23, pO2 129, HCO3 9  - Cont supp O2 to maintain O2 sat >92%  - F/u blood and urine cx  - Recommend ID and Pulm consults  - Further management per ICU

## 2023-08-13 NOTE — H&P ADULT - PROBLEM SELECTOR PLAN 7
Cont rosuvastatin Home regimen: Losartan, Hydralazine, Diltiazem  - Hold home Losartan and Hydralazine given hypotension/septic shock Home regimen: Losartan, Hydralazine, Diltiazem  - Hold home Losartan, diltiazem and Hydralazine given hypotension/septic shock

## 2023-08-13 NOTE — PROGRESS NOTE ADULT - ATTENDING COMMENTS
Mr. Rex Jarrell is a 72 y/o male with history of DM, HTN, HLD, Afib on Eliquis, TBI s/p R SDH evac in 9/2021, traumatic SDH 10/22 in ICU for sepsis pnemonia, latoya, and metabolic acidosis    Neuro:  -Baseline mental status: patient is able to relay needs to wife most of the time. Has been declining over the last few months.   -Repeat CTH at patient baseline with unchanged subdural, no concern of active bleed at this time  -Has not expressed pain/discomfort.    CV:   - Distributive shock in setting of pneumonia. POCUS with hyperdynamic LV. On levophed. Wean as tolerated.   - lactate trending down    Resp:   - B/l pneumonia, likely aspiration as wife states he sometimes coughs with eating.   - Continue zosyn and azithromycin.       GI:   -NPO at this time.   -Protonix for stress prophylaxis    Renal:   - LATOYA likely in setting of ATN/Sepsis.   - Anion gap metabolic acidosis secondary to lactic acidosis and uremia. Downtrending lactate.   - sodium bicarb infusion for now.   - Chow in place.  - Avoid nephrotoxic agent.     ID:   - Pan cultured; MRSA swab, legionella and urine cultures   - Empiric abx coverage w/ Zosyn and azithromycin.   - MRSA and RVP negative at time  - UA negative for active infx. Pending UCx results.      Heme:   - Begin heparin for DVT ppx  - Patient high bleeding risk. Will hold chemical AC currently for risk of overshooting with heparin ptt as patient is a high bleeding risk.   - INR elevated. Will not reverse as not actively bleeding.   - SCDs for DVT prophylaxis.    Endo:   - Start Lispro SS coverage  - Hemoglobin a1c in AM.    Ethics:  -Patient DNR but wife would want him to be intubated for a reversible cause.    Patient is critically ill. Needs ICU level care.    Discussed with wife Love at bedside. Mr. Rex Jarrell is a 74 y/o male with history of DM, HTN, HLD, Afib on Eliquis, TBI s/p R SDH evac in 9/2021, traumatic SDH 10/22 in ICU for sepsis pnemonia, latoya, and metabolic acidosis    Neuro:  -Baseline mental status: patient is able to relay needs to wife most of the time. Has been declining over the last few months.   -Repeat CTH at patient baseline with unchanged subdural, no concern of active bleed at this time  -Has not expressed pain/discomfort.    CV:   - Distributive shock in setting of pneumonia. POCUS with hyperdynamic LV. On levophed. Wean as tolerated.   - lactate trending down. Continue to trend lactate.    Resp:   - B/l pneumonia, likely aspiration as wife states he sometimes coughs with eating.   - Continue zosyn and azithromycin.       GI:   -NPO at this time.   -Protonix for stress prophylaxis    Renal:   - LATOYA likely in setting of ATN/Sepsis.   - Anion gap metabolic acidosis secondary to lactic acidosis and uremia. Downtrending lactate.   - sodium bicarb infusion for now.   - Chow in place.  - Avoid nephrotoxic agent.     ID:   - Pan cultured; MRSA swab, legionella and urine cultures   - Empiric abx coverage w/ Zosyn and azithromycin.   - MRSA and RVP negative at time  - UA negative for active infx. Pending UCx results.      Heme:   - Begin heparin for DVT ppx  - Patient high bleeding risk. Will hold chemical AC currently for risk of overshooting with heparin ptt as patient is a high bleeding risk.   - INR elevated. Will not reverse as not actively bleeding.   - SCDs for DVT prophylaxis.    Endo:   - Start Lispro SS coverage  - Hemoglobin a1c in AM.    Ethics:  -Patient DNR but wife would want him to be intubated for a reversible cause.    Patient is critically ill. Needs ICU level care.    Discussed with wife Love at bedside.

## 2023-08-13 NOTE — PROVIDER CONTACT NOTE (EICU) - SITUATION
72 yo M with compromised functional status, presented with signs of sepsis due to PNA, possible colitis.

## 2023-08-13 NOTE — H&P ADULT - PROBLEM SELECTOR PLAN 2
Baseline 0.6-0.8, per chart review  - Cr 1.5 on admission and worsening. Likely ATN 2/2 prerenal azotemia from dehydration and decreased perfusion  - Recommend Nephro consult  - s/p 3L IVF in ED. Recommend NS continuous until lactate decreases. Caution with pulmonary disease  - Further management per ICU

## 2023-08-13 NOTE — PROGRESS NOTE ADULT - PROBLEM SELECTOR PLAN 4
CT A/P: Colitis of distal sigmoid colon  - Diarrhea likely 2/2 inflammation vs infectious colitis. Low suspicion for C. diff  - Bowel rest, IVF  - Further management per ICU

## 2023-08-13 NOTE — H&P ADULT - ASSESSMENT
74 y/o M with PMHx DM2, HTN, HLD A-fib (on Eliquis), TBI (s/p SDH with EVAC on 9/2021), and traumatic subdural hemorrhage in 10/2022, BIBEMS with nausea and vomiting. Admitted for sepsis and AHRF. 74 y/o M with PMHx DM2, HTN, HLD A-fib (on Eliquis), TBI (s/p SDH with EVAC on 9/2021), and traumatic subdural hemorrhage in 10/2022, BIBEMS with nausea and vomiting. Admitted for sepsis and AHRF, and lactic acidosis

## 2023-08-13 NOTE — PROGRESS NOTE ADULT - ASSESSMENT
72 y/o M with PMHx DM2, HTN, HLD A-fib (on Eliquis), TBI (s/p SDH with EVAC on 9/2021), and traumatic subdural hemorrhage in 10/2022, BIBEMS with nausea and vomiting. Admitted to ICU for sepsis 2/2 PNA, LATOYA and metabolic acidosis

## 2023-08-13 NOTE — PROGRESS NOTE ADULT - SUBJECTIVE AND OBJECTIVE BOX
Interval Events: Patient oxygenation improving, on 6L [place] patient wife updated on patient treatment.    Review of Systems:  unable to obtain    ICU Vital Signs Last 24 Hrs  T(C): 36.3 (13 Aug 2023 08:00), Max: 37.1 (12 Aug 2023 20:19)  T(F): 97.3 (13 Aug 2023 08:00), Max: 98.7 (12 Aug 2023 20:19)  HR: 99 (13 Aug 2023 11:37) (78 - 131)  BP: 78/53 (13 Aug 2023 11:37) (72/51 - 106/56)  BP(mean): 61 (13 Aug 2023 11:37) (55 - 76)  ABP: --  ABP(mean): --  RR: 23 (13 Aug 2023 11:37) (12 - 32)  SpO2: 97% (13 Aug 2023 11:37) (83% - 99%)    O2 Parameters below as of 13 Aug 2023 07:15  Patient On (Oxygen Delivery Method): nasal cannula  O2 Flow (L/min): 6              CAPILLARY BLOOD GLUCOSE      POCT Blood Glucose.: 220 mg/dL (13 Aug 2023 11:05)      I&O's Summary    12 Aug 2023 07:01  -  13 Aug 2023 07:00  --------------------------------------------------------  IN: 1857 mL / OUT: 0 mL / NET: 1857 mL    13 Aug 2023 07:01  -  13 Aug 2023 11:50  --------------------------------------------------------  IN: 418.3 mL / OUT: 50 mL / NET: 368.3 mL        Physical Exam:   Gen: patient appears chronically ill  Neuro: patient is difficult to arose, continues to be lethargic  Resp: diminished lung sounds b/l, rales present b/l  CVS: normal s1 s2 no mrg  Abd: soft, non distended  Ext: no pitting edema present in lower extremities  Skin: pale as per wife, dry and warm    Meds:  azithromycin  IVPB IV Intermittent  piperacillin/tazobactam IVPB.. IV Intermittent    norepinephrine Infusion IV Continuous    atorvastatin Oral  dextrose 50% Injectable IV Push  dextrose 50% Injectable IV Push  dextrose 50% Injectable IV Push  dextrose Oral Gel Oral PRN  glucagon  Injectable IntraMuscular  insulin lispro (ADMELOG) corrective regimen sliding scale SubCutaneous      mirtazapine Oral      heparin   Injectable SubCutaneous    pantoprazole  Injectable IV Push      dextrose 5%. IV Continuous  dextrose 5%. IV Continuous  sodium bicarbonate  Infusion IV Continuous  sodium chloride 0.9% lock flush IV Push PRN                                  10.9   50.24 )-----------( 347      ( 13 Aug 2023 04:26 )             36.5     Bands 8.0        137  |  103  |  39<H>  ----------------------------<  253<H>  4.4   |  14<L>  |  1.80<H>    Ca    7.7<L>      13 Aug 2023 09:33  Phos  5.4       Mg     1.6         TPro  5.4<L>  /  Alb  2.5<L>  /  TBili  0.4  /  DBili  x   /  AST  13<L>  /  ALT  15  /  AlkPhos  71      Lactate 6.7            @ 09:33    Lactate 10.7            @ 04:26    Lactate 14.4           12 @ 21:50      CARDIAC MARKERS ( 12 Aug 2023 21:50 )  x     / x     / x     / x     / 1.0 ng/mL      PT/INR - ( 13 Aug 2023 04:26 )   PT: 21.5 sec;   INR: 1.87 ratio         PTT - ( 13 Aug 2023 04:26 )  PTT:34.8 sec  Urinalysis Basic - ( 13 Aug 2023 09:37 )    Color: Dark Yellow / Appearance: Cloudy / S.016 / pH: x  Gluc: x / Ketone: Trace mg/dL  / Bili: Negative / Urobili: 1.0 mg/dL   Blood: x / Protein: 300 mg/dL / Nitrite: Negative   Leuk Esterase: Small / RBC: x / WBC x   Sq Epi: x / Non Sq Epi: x / Bacteria: x          Rapid RVP Result: NotDetec ( @ 03:50)            Tubes/Lines:   femoral triple lumin central line  mcmahon        GLOBAL ISSUE/BEST PRACTICE:  Analgesia: Y  Sedation: N  HOB elevation: Y  Stress ulcer prophylaxis: Y  VTE prophylaxis: Y  Glycemic control: Y  Nutrition: NPO    CODE STATUS: Full Code       Interval Events: Patient oxygenation improving, on 6L [place] patient wife updated on patient treatment.    Review of Systems:  unable to obtain    ICU Vital Signs Last 24 Hrs  T(C): 36.3 (13 Aug 2023 08:00), Max: 37.1 (12 Aug 2023 20:19)  T(F): 97.3 (13 Aug 2023 08:00), Max: 98.7 (12 Aug 2023 20:19)  HR: 99 (13 Aug 2023 11:37) (78 - 131)  BP: 78/53 (13 Aug 2023 11:37) (72/51 - 106/56)  BP(mean): 61 (13 Aug 2023 11:37) (55 - 76)  ABP: --  ABP(mean): --  RR: 23 (13 Aug 2023 11:37) (12 - 32)  SpO2: 97% (13 Aug 2023 11:37) (83% - 99%)    O2 Parameters below as of 13 Aug 2023 07:15  Patient On (Oxygen Delivery Method): nasal cannula  O2 Flow (L/min): 6              CAPILLARY BLOOD GLUCOSE      POCT Blood Glucose.: 220 mg/dL (13 Aug 2023 11:05)      I&O's Summary    12 Aug 2023 07:01  -  13 Aug 2023 07:00  --------------------------------------------------------  IN: 1857 mL / OUT: 0 mL / NET: 1857 mL    13 Aug 2023 07:01  -  13 Aug 2023 11:50  --------------------------------------------------------  IN: 418.3 mL / OUT: 50 mL / NET: 368.3 mL        Physical Exam:   Gen: patient appears chronically ill  Neuro: patient is difficult to arose, continues to be lethargic  Resp: diminished lung sounds b/l, rales present b/l  CVS: normal s1 s2 no mrg  Abd: soft, non distended  Ext: no pitting edema present in lower extremities  Skin: pale as per wife, dry and warm    Meds:  azithromycin  IVPB IV Intermittent  piperacillin/tazobactam IVPB.. IV Intermittent    norepinephrine Infusion IV Continuous    atorvastatin Oral  dextrose 50% Injectable IV Push  dextrose 50% Injectable IV Push  dextrose 50% Injectable IV Push  dextrose Oral Gel Oral PRN  glucagon  Injectable IntraMuscular  insulin lispro (ADMELOG) corrective regimen sliding scale SubCutaneous      mirtazapine Oral      heparin   Injectable SubCutaneous    pantoprazole  Injectable IV Push      dextrose 5%. IV Continuous  dextrose 5%. IV Continuous  sodium bicarbonate  Infusion IV Continuous  sodium chloride 0.9% lock flush IV Push PRN                                  10.9   50.24 )-----------( 347      ( 13 Aug 2023 04:26 )             36.5     Bands 8.0        137  |  103  |  39<H>  ----------------------------<  253<H>  4.4   |  14<L>  |  1.80<H>    Ca    7.7<L>      13 Aug 2023 09:33  Phos  5.4       Mg     1.6         TPro  5.4<L>  /  Alb  2.5<L>  /  TBili  0.4  /  DBili  x   /  AST  13<L>  /  ALT  15  /  AlkPhos  71      Lactate 6.7            @ 09:33    Lactate 10.7            @ 04:26    Lactate 14.4           12 @ 21:50      CARDIAC MARKERS ( 12 Aug 2023 21:50 )  x     / x     / x     / x     / 1.0 ng/mL      PT/INR - ( 13 Aug 2023 04:26 )   PT: 21.5 sec;   INR: 1.87 ratio         PTT - ( 13 Aug 2023 04:26 )  PTT:34.8 sec  Urinalysis Basic - ( 13 Aug 2023 09:37 )    Color: Dark Yellow / Appearance: Cloudy / S.016 / pH: x  Gluc: x / Ketone: Trace mg/dL  / Bili: Negative / Urobili: 1.0 mg/dL   Blood: x / Protein: 300 mg/dL / Nitrite: Negative   Leuk Esterase: Small / RBC: x / WBC x   Sq Epi: x / Non Sq Epi: x / Bacteria: x          Rapid RVP Result: NotDetec ( @ 03:50)            Tubes/Lines:   femoral triple lumen central line  mcmahon        GLOBAL ISSUE/BEST PRACTICE:  Analgesia: Y  Sedation: N  HOB elevation: Y  Stress ulcer prophylaxis: Y  VTE prophylaxis: Y  Glycemic control: Y  Nutrition: NPO    CODE STATUS: Full Code

## 2023-08-13 NOTE — PROGRESS NOTE ADULT - ATTENDING COMMENTS
72 y/o M with PMHx DM2, HTN, HLD A-fib (on Eliquis), TBI (s/p SDH with EVAC on 9/2021), and traumatic subdural hemorrhage in 10/2022, BIBEMS with nausea and vomiting. Admitted to ICU for sepsis 2/2 PNA, LATOYA and metabolic acidosis. Prognosis is guarded. Plan as above. Plan d/w residents

## 2023-08-13 NOTE — H&P ADULT - NSHPSOCIALHISTORY_GEN_ALL_CORE
Denies alcohol, tobacco, recreational drug use  Lives: at home with wife (primary care giver w/ [facility])  ADLs: fully dependent  Ambulation: bedbound, occasional wheelchair

## 2023-08-13 NOTE — H&P ADULT - PROBLEM SELECTOR PLAN 5
Well controlled, on Metformin 1000mg BID. Not on home insulin  - P/w lactic acidosis in setting of acute illness, likely component of Metformin side effect as well  - A1C: 5.5 in Jun 2023  - Low ISS  - Regular finger sticks  - Consistent carb diet  - Hypoglycemic protocol.  - Further management per ICU Likely 2/2 deconditioning/immobility   - Cont gabapentin  - S/p ofirmev in ED   - Further management per ICU

## 2023-08-13 NOTE — PROGRESS NOTE ADULT - PROBLEM SELECTOR PLAN 2
Baseline 0.6-0.8, per chart review  - Cr 1.5 on admission and worsening  - On Bicarb gtt for metabolic acidosis  - Further management per ICU

## 2023-08-13 NOTE — PROGRESS NOTE ADULT - ASSESSMENT
74 y/o male with PMHx of DM, HTN, HLD, Afib on Eliquis, TBI s/p R SDH evac in 9/2021, traumatic SDH 10/22 in ICU for sepsis pnemonia, latoya, and metabolic acidosis    Neuro:  Repeat CTH at patient baseline, no concern of active bleed at this time  continnue home mirtazapine    CV:   - continue Levophed gtt for hypotension    - lactate trending down  - continue home atorvastatin    Resp:   - on NC 6L stable      GI:   -NPO at this time.   -Protonix for stress prophylaxis    Renal:   - LATOYA likely in setting of ATN/Sepsis.   - Metabolic acidosis secondary to lactic acidosis.   - sodium bicarb infusion    ID:   -Pan cultured; newly added UA, MRSA swab, legionella and urine cultures   - Empiric abx coverage w/ Zosyn and Vanco.   - MRSA and RVP negative at time      Heme:   - Begin heparin for DVT ppx  - Chadvasc of 4.8% vs hasblood 8.4% may discuss bleed vs stroke risk with patient's wife in order to determine the need for anticoagulation     Endo:   - Start Lispro SS coverage

## 2023-08-13 NOTE — H&P ADULT - PROBLEM SELECTOR PLAN 6
Home regimen: Losartan, Hydralazine, Diltiazem  - Hold home Losartan and Hydralazine given hypotension/septic shock Well controlled, on Metformin 1000mg BID. Not on home insulin  - P/w lactic acidosis in setting of acute illness, likely component of Metformin side effect as well  - A1C: 5.5 in Jun 2023  - Low ISS  - Regular finger sticks  - Consistent carb diet  - Hypoglycemic protocol.  - Further management per ICU

## 2023-08-13 NOTE — H&P ADULT - PROBLEM SELECTOR PLAN 4
Likely 2/2 deconditioning/immobility   - Cont gabapentin  - S/p ofirmev in ED   - Further management per ICU CT A/P: Colitis of distal sigmoid colon  - Diarrhea likely 2/2 inflammation vs infectious colitis. Low suspicion for C. diff  - GI PCR  - Bowel rest, IVF  - Speech and swallow eval   - Further management per ICU

## 2023-08-14 LAB
A1C WITH ESTIMATED AVERAGE GLUCOSE RESULT: 5.6 % — SIGNIFICANT CHANGE UP (ref 4–5.6)
ALBUMIN SERPL ELPH-MCNC: 3.1 G/DL — LOW (ref 3.3–5)
ALP SERPL-CCNC: 51 U/L — SIGNIFICANT CHANGE UP (ref 40–120)
ALT FLD-CCNC: 14 U/L — SIGNIFICANT CHANGE UP (ref 12–78)
ANION GAP SERPL CALC-SCNC: 11 MMOL/L — SIGNIFICANT CHANGE UP (ref 5–17)
AST SERPL-CCNC: 9 U/L — LOW (ref 15–37)
BILIRUB SERPL-MCNC: 0.6 MG/DL — SIGNIFICANT CHANGE UP (ref 0.2–1.2)
BUN SERPL-MCNC: 42 MG/DL — HIGH (ref 7–23)
CALCIUM SERPL-MCNC: 8.2 MG/DL — LOW (ref 8.5–10.1)
CHLORIDE SERPL-SCNC: 104 MMOL/L — SIGNIFICANT CHANGE UP (ref 96–108)
CO2 SERPL-SCNC: 23 MMOL/L — SIGNIFICANT CHANGE UP (ref 22–31)
CREAT SERPL-MCNC: 2 MG/DL — HIGH (ref 0.5–1.3)
CULTURE RESULTS: NO GROWTH — SIGNIFICANT CHANGE UP
EGFR: 35 ML/MIN/1.73M2 — LOW
ESTIMATED AVERAGE GLUCOSE: 114 MG/DL — SIGNIFICANT CHANGE UP (ref 68–114)
GLUCOSE SERPL-MCNC: 171 MG/DL — HIGH (ref 70–99)
HCT VFR BLD CALC: 26.1 % — LOW (ref 39–50)
HGB BLD-MCNC: 8.5 G/DL — LOW (ref 13–17)
LACTATE SERPL-SCNC: 1.5 MMOL/L — SIGNIFICANT CHANGE UP (ref 0.7–2)
LEGIONELLA AG UR QL: NEGATIVE — SIGNIFICANT CHANGE UP
MAGNESIUM SERPL-MCNC: 1.7 MG/DL — SIGNIFICANT CHANGE UP (ref 1.6–2.6)
MCHC RBC-ENTMCNC: 24.6 PG — LOW (ref 27–34)
MCHC RBC-ENTMCNC: 32.6 GM/DL — SIGNIFICANT CHANGE UP (ref 32–36)
MCV RBC AUTO: 75.7 FL — LOW (ref 80–100)
NRBC # BLD: 0 /100 WBCS — SIGNIFICANT CHANGE UP (ref 0–0)
PHOSPHATE SERPL-MCNC: 5 MG/DL — HIGH (ref 2.5–4.5)
PLATELET # BLD AUTO: 176 K/UL — SIGNIFICANT CHANGE UP (ref 150–400)
POTASSIUM SERPL-MCNC: 4 MMOL/L — SIGNIFICANT CHANGE UP (ref 3.5–5.3)
POTASSIUM SERPL-SCNC: 4 MMOL/L — SIGNIFICANT CHANGE UP (ref 3.5–5.3)
PROT SERPL-MCNC: 5.4 G/DL — LOW (ref 6–8.3)
RBC # BLD: 3.45 M/UL — LOW (ref 4.2–5.8)
RBC # FLD: 17.2 % — HIGH (ref 10.3–14.5)
SODIUM SERPL-SCNC: 138 MMOL/L — SIGNIFICANT CHANGE UP (ref 135–145)
SPECIMEN SOURCE: SIGNIFICANT CHANGE UP
VANCOMYCIN FLD-MCNC: 11.9 UG/ML — SIGNIFICANT CHANGE UP (ref 10–25)
WBC # BLD: 9.95 K/UL — SIGNIFICANT CHANGE UP (ref 3.8–10.5)
WBC # FLD AUTO: 9.95 K/UL — SIGNIFICANT CHANGE UP (ref 3.8–10.5)

## 2023-08-14 PROCEDURE — 99233 SBSQ HOSP IP/OBS HIGH 50: CPT | Mod: GC

## 2023-08-14 PROCEDURE — 93306 TTE W/DOPPLER COMPLETE: CPT | Mod: 26

## 2023-08-14 PROCEDURE — 99291 CRITICAL CARE FIRST HOUR: CPT

## 2023-08-14 RX ORDER — INSULIN LISPRO 100/ML
VIAL (ML) SUBCUTANEOUS EVERY 6 HOURS
Refills: 0 | Status: DISCONTINUED | OUTPATIENT
Start: 2023-08-14 | End: 2023-08-15

## 2023-08-14 RX ORDER — HYDROCORTISONE 20 MG
50 TABLET ORAL EVERY 8 HOURS
Refills: 0 | Status: DISCONTINUED | OUTPATIENT
Start: 2023-08-14 | End: 2023-08-17

## 2023-08-14 RX ORDER — VANCOMYCIN HCL 1 G
750 VIAL (EA) INTRAVENOUS ONCE
Refills: 0 | Status: COMPLETED | OUTPATIENT
Start: 2023-08-14 | End: 2023-08-14

## 2023-08-14 RX ADMIN — Medication 1: at 17:22

## 2023-08-14 RX ADMIN — SODIUM CHLORIDE 4 MILLILITER(S): 9 INJECTION INTRAMUSCULAR; INTRAVENOUS; SUBCUTANEOUS at 07:50

## 2023-08-14 RX ADMIN — MUPIROCIN 1 APPLICATION(S): 20 OINTMENT TOPICAL at 17:22

## 2023-08-14 RX ADMIN — HEPARIN SODIUM 5000 UNIT(S): 5000 INJECTION INTRAVENOUS; SUBCUTANEOUS at 13:05

## 2023-08-14 RX ADMIN — Medication 50 MILLIGRAM(S): at 13:05

## 2023-08-14 RX ADMIN — Medication 50 MILLIGRAM(S): at 21:11

## 2023-08-14 RX ADMIN — Medication 50 MILLIGRAM(S): at 05:06

## 2023-08-14 RX ADMIN — PIPERACILLIN AND TAZOBACTAM 25 GRAM(S): 4; .5 INJECTION, POWDER, LYOPHILIZED, FOR SOLUTION INTRAVENOUS at 13:05

## 2023-08-14 RX ADMIN — PIPERACILLIN AND TAZOBACTAM 25 GRAM(S): 4; .5 INJECTION, POWDER, LYOPHILIZED, FOR SOLUTION INTRAVENOUS at 21:15

## 2023-08-14 RX ADMIN — Medication 250 MILLIGRAM(S): at 13:05

## 2023-08-14 RX ADMIN — SODIUM CHLORIDE 4 MILLILITER(S): 9 INJECTION INTRAMUSCULAR; INTRAVENOUS; SUBCUTANEOUS at 19:46

## 2023-08-14 RX ADMIN — HEPARIN SODIUM 5000 UNIT(S): 5000 INJECTION INTRAVENOUS; SUBCUTANEOUS at 05:06

## 2023-08-14 RX ADMIN — PIPERACILLIN AND TAZOBACTAM 25 GRAM(S): 4; .5 INJECTION, POWDER, LYOPHILIZED, FOR SOLUTION INTRAVENOUS at 05:08

## 2023-08-14 RX ADMIN — CHLORHEXIDINE GLUCONATE 1 APPLICATION(S): 213 SOLUTION TOPICAL at 05:09

## 2023-08-14 RX ADMIN — MUPIROCIN 1 APPLICATION(S): 20 OINTMENT TOPICAL at 05:08

## 2023-08-14 RX ADMIN — Medication 1: at 23:25

## 2023-08-14 RX ADMIN — Medication 50 MILLILITER(S): at 02:31

## 2023-08-14 RX ADMIN — PANTOPRAZOLE SODIUM 40 MILLIGRAM(S): 20 TABLET, DELAYED RELEASE ORAL at 13:05

## 2023-08-14 RX ADMIN — HEPARIN SODIUM 5000 UNIT(S): 5000 INJECTION INTRAVENOUS; SUBCUTANEOUS at 21:12

## 2023-08-14 RX ADMIN — AZITHROMYCIN 255 MILLIGRAM(S): 500 TABLET, FILM COATED ORAL at 02:31

## 2023-08-14 NOTE — PROGRESS NOTE ADULT - ASSESSMENT
NEURO:   -Monitor mental status closely, avoid neurosuppresants. Serial neurologic assessments.   -Remeron QD.    CV:   -Remains in vasopressor-dependent shock state w/ Levophed infusion - actively titrating to maintain goal MAP >65 monitoring end points of organ perfusion. Albumin 25% 100cc q4h x2 ordered this evening to offload vasopressor therapy and facilitate weaning off. SDS w/ Solu-Cortef. Consider adding Midodrine to further wean vasopressors if able to tolerate PO.   -Keep K~4 and Mg>2 for optimal arrhythmia suppression.  -Lipitor QD.   -Official TTE pending.     PULM:   -Satting well on NC, actively titrating FiO2 to maintain goal SpO2 >88%.   -Incentive spirometry, Chest PT/Pulmonary toilet, HOB >30'. NaCl 3% inhalation q12h added.      GI:   -NPO.   -Protonix QD.  -S/s evaluation.     RENAL:   -Renal function currently w/ LATOYA likely ATN 2/2 septic shock.  -trend lytes/Scr daily with BMP  -I's and O's, goal UOP 0.5 cc/kg/hr  -renal dose meds and avoid nephrotoxins   -Alkali therapy w/ HCO3 infusion stopped.     ENDO:   -Aggressive glycemic control to limit FS glucose to <180mg/dl. ISS.   -Keep Euthyroid.     ID:   -Afebrile w/ leukocytosis. Lactate downtrending. CT Chest w/ evidence of b/l PNA. UA negative. BloodCx, UCx, Legionella sent - pending. Empiric Zosyn / Zithromax courses.   -ABX use and/or discontinuation based on discussion with ID in conjunction with clinical features, culture data, and judicious procalcitonin monitoring.    HEME:   -Pharmacologic DVT Prophylaxis in addition to SCD's w/ SQH.     GOC: DNR w/ trial of intubation.  74 y/o male with PMHx of DM, HTN, HLD, Afib on Eliquis, TBI s/p R SDH evac in 9/2021, traumatic SDH 10/22 in ICU for sepsis pnemonia, latoya, and metabolic acidosis    NEURO:   -Monitor mental status closely, avoid neurosuppresants. Serial neurologic assessments.   -A&O x1, per baseline  -Repeat CTH per baseline, no concern for active bleed atthis time  -Remeron QD.    CV:   -S/p Albumin 25% 100cc q4h x2, SDS w/ Solu-Cortef  -Levophed gtt weaned and d/c'd as of this AM  -Tolerating off pressors well, MAP > 65.  -Keep K~4 and Mg>2 for optimal arrhythmia suppression.  -Lipitor QD.   -Official TTE pending.     PULM:   -Satting well on 2L NC, actively titrating FiO2 to maintain goal SpO2 >88%.   -Incentive spirometry, Chest PT/Pulmonary toilet, HOB >30'. NaCl 3% inhalation q12h added.      GI:   -NPO.   -Protonix QD.  -S/s evaluation.     RENAL:   -BUN/CR currently 42/2, stable, c/w LATOYA likely 2/2 septic shock.  -trend lytes/Scr daily with BMP  -I's and O's, goal UOP 0.5 cc/kg/hr  -renal dose meds and avoid nephrotoxins   -S/p Alkali therapy w/ HCO3, acidosis now resolved    ENDO:   -Aggressive glycemic control to limit FS glucose to <180mg/dl. ISS.   -Keep Euthyroid.     ID:   -Afebrile w/ leukocytosis. Lactate downtrending. CT Chest w/ evidence of b/l PNA. UA negative. Blood Cx, Urine Cx, Legionella Ag negative.  -MRSA PCR positive  -D/c Azithromax  -C/w Vanc/Zosyn  -ABX use and/or discontinuation based on discussion with ID in conjunction with clinical features, culture data, and judicious procalcitonin monitoring.    HEME:   -SCDs, hold Heparin in the setting of hx cranial hemorrhage    GOC:   DNR w/ trial of intubation.     #Lines  - will remove L femoral vein central line today

## 2023-08-14 NOTE — CONSULT NOTE ADULT - ASSESSMENT
- Patient is   - No clear evidence of acute ischemia, trops negative x 2. Will follow up third set.  - His CKs are flat, suggesting against acute atherosclerotic plaque rupture.  - Biomarker trend is not consistent with plaque rupture but rather demand ischemia. Monitor closely for the development of anginal symptoms or clinical signs of ischemia.   - No acute changes on EKG compared to previous.  - No meaningful evidence of volume overload.  - Previous TTE shows ___.  - BP well controlled, monitor routine hemodynamics.  - Continue ___.  - Monitor and replete lytes, keep K>4, Mg>2.  - Strict I/Os, daily weights.  - Pt has no active ischemia, decompensated heart failure, unstable arrythmia, or severe stenotic valvular disease, and has __ cardiac risk factors. In the setting of low risk ___, he/she is optimized from cardiovascular standpoint to proceed with planned procedure with routine hemodynamic monitoring.   - Pt has no modifiable active cardiac risk factors and in the setting of low risk _____, patient is optimized as best as possible from cardiovascular standpoint to proceed with planned procedure with routine hemodynamic monitoring.  - Other cardiovascular workup will depend on clinical course.  - All other workup per primary team.  - Will continue to follow.             72 y/o M with PMHx DM2, HTN, HLD A-fib (on Eliquis), TBI (s/p SDH with EVAC on 9/2021), and traumatic subdural hemorrhage in 10/2022, BIBEMS with hypoxia, nausea and vomiting. Admitted to ICU for sepsis pneumonia, bin, and metabolic acidosis.   Cardiologist: Dr. Mirta Goldsmith    - No clear evidence of acute ischemia, trops negative x 2.   - EKG showed Afib  - No acute changes on EKG compared to previous.  - Patient previously on Eliquis, held at this time due to acute anemia. In the past when AC was held due to subdural hemorrhage, patient developed DVT.  - Hold AC for now, but resume as soon as it's safe.  - Restart patient's home diltiazem for rate control when hemodynamically stable.  - Continue to monitor on telemetry.    - No meaningful evidence of volume overload.  - Previous TTE on 6/11/23 showed LVEF 60-65% with trace mitral regurgitation.  - Follow-up repeat TTE.    - Patient initially required pressors during this admission for hypotension. Patient was weaned off of pressors this morning. BP is currently stable.  - monitor routine hemodynamics.  - Continue to hold home BP meds, hydralazine and losartan.     - Monitor and replete lytes, keep K>4, Mg>2.  - Strict I/Os, daily weights.    - Other cardiovascular workup will depend on clinical course.  - All other workup per primary team.  - Will continue to follow.             74 y/o M with PMHx DM2, HTN, HLD A-fib (on Eliquis), TBI (s/p SDH with EVAC on 9/2021), and traumatic subdural hemorrhage in 10/2022, BIBEMS with hypoxia, nausea and vomiting. Admitted to ICU for sepsis pneumonia, bin, and metabolic acidosis.   Cardiologist: Dr. Mirta Goldsmith    - No clear evidence of acute ischemia, trops negative x 2.   - EKG showed Afib, no acute changes on EKG compared to previous.  - Pt currently in AFib with HR range 100-130s on monitor  - Patient previously on Eliquis, held at this time due to acute anemia. In the past when AC was held due to subdural hemorrhage, patient developed DVT.  - Hold AC for now, but resume as soon as it's safe.  - Restart patient's home diltiazem for rate control when hemodynamically stable.  - Continue to monitor on telemetry.    - No meaningful evidence of volume overload.  - Previous TTE on 6/11/23 showed LVEF 60-65% with trace mitral regurgitation.  - Follow-up repeat TTE.    - Patient initially required pressors during this admission for hypotension. Patient was weaned off of pressors this morning. BP is currently stable.  - monitor routine hemodynamics.  - Continue to hold home BP meds, hydralazine and losartan.     - Monitor and replete lytes, keep K>4, Mg>2.  - Strict I/Os, daily weights.    - Other cardiovascular workup will depend on clinical course.  - All other workup per primary team.  - Will continue to follow.

## 2023-08-14 NOTE — PROGRESS NOTE ADULT - SUBJECTIVE AND OBJECTIVE BOX
Patient is a 73y old  Male who presents with a chief complaint of Sepsis (14 Aug 2023 11:16)      Subjective:  INTERVAL HPI/OVERNIGHT EVENTS: Patient seen and examined at bedside. No overnight events occurred. Pt wife at bedside and reports patient is looking much better. Patient has no complaints at this time. Patient is mostly non-verbal at baseline and responds to yes or no questions. Unable to assess ROS, but when asked about Pt, patient stated no.     MEDICATIONS  (STANDING):  atorvastatin 40 milliGRAM(s) Oral at bedtime  chlorhexidine 2% Cloths 1 Application(s) Topical every 24 hours  dextrose 5%. 1000 milliLiter(s) (50 mL/Hr) IV Continuous <Continuous>  dextrose 5%. 1000 milliLiter(s) (100 mL/Hr) IV Continuous <Continuous>  dextrose 50% Injectable 25 Gram(s) IV Push once  dextrose 50% Injectable 25 Gram(s) IV Push once  dextrose 50% Injectable 12.5 Gram(s) IV Push once  glucagon  Injectable 1 milliGRAM(s) IntraMuscular once  heparin   Injectable 5000 Unit(s) SubCutaneous every 8 hours  hydrocortisone sodium succinate Injectable 50 milliGRAM(s) IV Push every 8 hours  insulin lispro (ADMELOG) corrective regimen sliding scale   SubCutaneous every 6 hours  mirtazapine 7.5 milliGRAM(s) Oral daily  mupirocin 2% Nasal 1 Application(s) Both Nostrils every 12 hours  pantoprazole  Injectable 40 milliGRAM(s) IV Push daily  piperacillin/tazobactam IVPB.. 3.375 Gram(s) IV Intermittent every 8 hours  sodium chloride 3%  Inhalation 4 milliLiter(s) Inhalation every 12 hours  vancomycin  IVPB 750 milliGRAM(s) IV Intermittent once    MEDICATIONS  (PRN):  dextrose Oral Gel 15 Gram(s) Oral once PRN Blood Glucose LESS THAN 70 milliGRAM(s)/deciliter  sodium chloride 0.9% lock flush 10 milliLiter(s) IV Push every 1 hour PRN Pre/post blood products, medications, blood draw, and to maintain line patency      Allergies    No Known Allergies    Intolerances        REVIEW OF SYSTEMS:  unable to obtain as per HPI    Objective:  Vital Signs Last 24 Hrs  T(C): 36.7 (14 Aug 2023 12:30), Max: 37.8 (13 Aug 2023 19:58)  T(F): 98.1 (14 Aug 2023 12:30), Max: 100 (13 Aug 2023 19:58)  HR: 105 (14 Aug 2023 10:00) (85 - 112)  BP: 98/67 (14 Aug 2023 10:00) (87/51 - 136/79)  BP(mean): 78 (14 Aug 2023 10:00) (64 - 103)  RR: 14 (14 Aug 2023 10:00) (12 - 18)  SpO2: 98% (14 Aug 2023 10:00) (89% - 100%)    Parameters below as of 14 Aug 2023 07:57  Patient On (Oxygen Delivery Method): nasal cannula, 2 LPM        GENERAL: NAD, lying in bed  HEAD:  Atraumatic, Normocephalic  EYES: conjunctiva and sclera clear  ENT: dry mucous membranes  NECK: Supple  CHEST/LUNG: coarse breath sounds, clear lower lateral lung fields, unable to assess posterior lung fields.  HEART: Regular rate and rhythm; No murmurs, rubs, or gallops  ABDOMEN: Bowel sounds present; Soft, Nontender, Nondistended.  EXTREMITIES:  2+ Peripheral Pulses (radial pulse) , brisk capillary refill. No clubbing, cyanosis, or edema  NERVOUS SYSTEM:  Alert & Oriented X1 (name), speech is unclear, unable to follow commands.   MSK: Pt arms in flexed state likely 2/2 TBI      LABS:                        8.5    9.95  )-----------( 176      ( 14 Aug 2023 08:40 )             26.1     CBC Full  -  ( 14 Aug 2023 08:40 )  WBC Count : 9.95 K/uL  Hemoglobin : 8.5 g/dL  Hematocrit : 26.1 %  Platelet Count - Automated : 176 K/uL  Mean Cell Volume : 75.7 fl  Mean Cell Hemoglobin : 24.6 pg  Mean Cell Hemoglobin Concentration : 32.6 gm/dL  Auto Neutrophil # : x  Auto Lymphocyte # : x  Auto Monocyte # : x  Auto Eosinophil # : x  Auto Basophil # : x  Auto Neutrophil % : x  Auto Lymphocyte % : x  Auto Monocyte % : x  Auto Eosinophil % : x  Auto Basophil % : x    14 Aug 2023 05:22    138    |  104    |  42     ----------------------------<  171    4.0     |  23     |  2.00     Ca    8.2        14 Aug 2023 05:22  Phos  5.0       14 Aug 2023 05:22  Mg     1.7       14 Aug 2023 05:22    TPro  5.4    /  Alb  3.1    /  TBili  0.6    /  DBili  x      /  AST  9      /  ALT  14     /  AlkPhos  51     14 Aug 2023 05:22    PT/INR - ( 13 Aug 2023 04:26 )   PT: 21.5 sec;   INR: 1.87 ratio         PTT - ( 13 Aug 2023 04:26 )  PTT:34.8 sec  Urinalysis Basic - ( 14 Aug 2023 05:22 )    Color: x / Appearance: x / SG: x / pH: x  Gluc: 171 mg/dL / Ketone: x  / Bili: x / Urobili: x   Blood: x / Protein: x / Nitrite: x   Leuk Esterase: x / RBC: x / WBC x   Sq Epi: x / Non Sq Epi: x / Bacteria: x      CAPILLARY BLOOD GLUCOSE      POCT Blood Glucose.: 176 mg/dL (14 Aug 2023 05:04)  POCT Blood Glucose.: 167 mg/dL (13 Aug 2023 23:21)  POCT Blood Glucose.: 99 mg/dL (13 Aug 2023 17:03)        Culture - Blood (collected 08-12-23 @ 23:47)  Source: .Blood Blood-Peripheral  Preliminary Report (08-14-23 @ 07:01):    No growth at 24 hours    Culture - Blood (collected 08-12-23 @ 23:40)  Source: .Blood Blood-Peripheral  Preliminary Report (08-14-23 @ 07:01):    No growth at 24 hours        RADIOLOGY & ADDITIONAL TESTS:  < from: CT Head No Cont (08.13.23 @ 02:02) >  INTERPRETATION:  CT HEAD    INDICATIONS: Follow-up hemorrhage    TECHNIQUE:  Serial axial images were obtained from the skull base to the vertex   without the use of intravenous contrast.    COMPARISON EXAMINATION: CT head from same 8/12/2023 at 10:15 PM    FINDINGS:  Brain parenchyma/extra-axial compartments: Stable encephalomalacia   involving the anteroinferior right frontal/temporal lobes and left   occipital lobe, and to a lesser extent the paramedian left frontal lobe.   There is layering hyperdensity present along the posterior falx which   measures approximately 4 mm in thickness, slightly decreased in size as   compared to prior exam in June 2023. Redemonstration of streak artifact   at the high right frontal convexity.    The ventricles remain significantly dilated with stable appearance of   periventricular hypodensity is compared to the prior study. The cortical   sulci appear effaced at the vertex with disproportionate enlargement of   the sylvian fissures, unchanged. The basal cisterns are patent.   Craniocervical junction and sella turcica are within normal limits.    Calvarium, paranasal sinuses, and orbits: Evidence ofprior right frontal   craniotomy. Otherwise, the calvarium is intact. Paranasal sinuses and   mastoid air cells are clear. The orbits are within normal limits.      IMPRESSION:  Persistent posterior parafalcine subdural hematoma identified measuring   approximately 4 mm in thickness, without interval change as compared to   prior exam from 6 hours ago. No new mass effect, intraventricular   extension, or new sites of intracranial hemorrhage identified.    Previously suspected subarachnoid hemorrhage at the right frontal high   convexity is favored to represent streak artifact.    Multifocal chronic infarcts present, superimposed upon stable background   white matter hypoattenuation.    Critical findings above related to persistent intracranial hemorrhage   were discussed directly by the ED radiologist on call, Myles Sandhu MD,   with the intensive care unit physician, Dr. Sahni, at 2:30 AM on   8/13/2023.    < end of copied text >    Personally reviewed.     Consultant(s) Notes Reviewed:  [x] YES  [ ] NO     Patient is a 73y old  Male who presents with a chief complaint of Sepsis (14 Aug 2023 11:16)      Subjective:  INTERVAL HPI/OVERNIGHT EVENTS: Patient seen and examined at bedside. No overnight events occurred. Pt wife at bedside and reports patient is looking much better. Patient has no complaints at this time. Patient is mostly non-verbal at baseline and responds to yes or no questions. Unable to assess ROS, but when asked about Pt, patient stated no.     MEDICATIONS  (STANDING):  atorvastatin 40 milliGRAM(s) Oral at bedtime  chlorhexidine 2% Cloths 1 Application(s) Topical every 24 hours  dextrose 5%. 1000 milliLiter(s) (50 mL/Hr) IV Continuous <Continuous>  dextrose 5%. 1000 milliLiter(s) (100 mL/Hr) IV Continuous <Continuous>  dextrose 50% Injectable 25 Gram(s) IV Push once  dextrose 50% Injectable 25 Gram(s) IV Push once  dextrose 50% Injectable 12.5 Gram(s) IV Push once  glucagon  Injectable 1 milliGRAM(s) IntraMuscular once  heparin   Injectable 5000 Unit(s) SubCutaneous every 8 hours  hydrocortisone sodium succinate Injectable 50 milliGRAM(s) IV Push every 8 hours  insulin lispro (ADMELOG) corrective regimen sliding scale   SubCutaneous every 6 hours  mirtazapine 7.5 milliGRAM(s) Oral daily  mupirocin 2% Nasal 1 Application(s) Both Nostrils every 12 hours  pantoprazole  Injectable 40 milliGRAM(s) IV Push daily  piperacillin/tazobactam IVPB.. 3.375 Gram(s) IV Intermittent every 8 hours  sodium chloride 3%  Inhalation 4 milliLiter(s) Inhalation every 12 hours  vancomycin  IVPB 750 milliGRAM(s) IV Intermittent once    MEDICATIONS  (PRN):  dextrose Oral Gel 15 Gram(s) Oral once PRN Blood Glucose LESS THAN 70 milliGRAM(s)/deciliter  sodium chloride 0.9% lock flush 10 milliLiter(s) IV Push every 1 hour PRN Pre/post blood products, medications, blood draw, and to maintain line patency      Allergies    No Known Allergies    Intolerances        REVIEW OF SYSTEMS:  unable to obtain as per HPI    Objective:  Vital Signs Last 24 Hrs  T(C): 36.7 (14 Aug 2023 12:30), Max: 37.8 (13 Aug 2023 19:58)  T(F): 98.1 (14 Aug 2023 12:30), Max: 100 (13 Aug 2023 19:58)  HR: 105 (14 Aug 2023 10:00) (85 - 112)  BP: 98/67 (14 Aug 2023 10:00) (87/51 - 136/79)  BP(mean): 78 (14 Aug 2023 10:00) (64 - 103)  RR: 14 (14 Aug 2023 10:00) (12 - 18)  SpO2: 98% (14 Aug 2023 10:00) (89% - 100%)    Parameters below as of 14 Aug 2023 07:57  Patient On (Oxygen Delivery Method): nasal cannula, 2 LPM        GENERAL: NAD, lying in bed  HEAD:  Atraumatic, Normocephalic  EYES: conjunctiva and sclera clear  ENT: dry mucous membranes  NECK: Supple  CHEST/LUNG: coarse breath sounds, clear lower lateral lung fields, unable to assess posterior lung fields.  HEART: Regular rate and rhythm; No murmurs,  no tachy   ABDOMEN: Bowel sounds present; Soft, Nontender, Nondistended.  EXTREMITIES:  2+ Peripheral Pulses (radial pulse) , brisk capillary refill. No clubbing, cyanosis, or edema  NERVOUS SYSTEM:  Alert & Oriented X1 (name), speech is unclear, unable to follow commands.   MSK: Pt arms in flexed state likely 2/2 TBI      LABS:                        8.5    9.95  )-----------( 176      ( 14 Aug 2023 08:40 )             26.1     CBC Full  -  ( 14 Aug 2023 08:40 )  WBC Count : 9.95 K/uL  Hemoglobin : 8.5 g/dL  Hematocrit : 26.1 %  Platelet Count - Automated : 176 K/uL  Mean Cell Volume : 75.7 fl  Mean Cell Hemoglobin : 24.6 pg  Mean Cell Hemoglobin Concentration : 32.6 gm/dL  Auto Neutrophil # : x  Auto Lymphocyte # : x  Auto Monocyte # : x  Auto Eosinophil # : x  Auto Basophil # : x  Auto Neutrophil % : x  Auto Lymphocyte % : x  Auto Monocyte % : x  Auto Eosinophil % : x  Auto Basophil % : x    14 Aug 2023 05:22    138    |  104    |  42     ----------------------------<  171    4.0     |  23     |  2.00     Ca    8.2        14 Aug 2023 05:22  Phos  5.0       14 Aug 2023 05:22  Mg     1.7       14 Aug 2023 05:22    TPro  5.4    /  Alb  3.1    /  TBili  0.6    /  DBili  x      /  AST  9      /  ALT  14     /  AlkPhos  51     14 Aug 2023 05:22    PT/INR - ( 13 Aug 2023 04:26 )   PT: 21.5 sec;   INR: 1.87 ratio         PTT - ( 13 Aug 2023 04:26 )  PTT:34.8 sec  Urinalysis Basic - ( 14 Aug 2023 05:22 )    Color: x / Appearance: x / SG: x / pH: x  Gluc: 171 mg/dL / Ketone: x  / Bili: x / Urobili: x   Blood: x / Protein: x / Nitrite: x   Leuk Esterase: x / RBC: x / WBC x   Sq Epi: x / Non Sq Epi: x / Bacteria: x      CAPILLARY BLOOD GLUCOSE      POCT Blood Glucose.: 176 mg/dL (14 Aug 2023 05:04)  POCT Blood Glucose.: 167 mg/dL (13 Aug 2023 23:21)  POCT Blood Glucose.: 99 mg/dL (13 Aug 2023 17:03)        Culture - Blood (collected 08-12-23 @ 23:47)  Source: .Blood Blood-Peripheral  Preliminary Report (08-14-23 @ 07:01):    No growth at 24 hours    Culture - Blood (collected 08-12-23 @ 23:40)  Source: .Blood Blood-Peripheral  Preliminary Report (08-14-23 @ 07:01):    No growth at 24 hours        RADIOLOGY & ADDITIONAL TESTS:  < from: CT Head No Cont (08.13.23 @ 02:02) >  INTERPRETATION:  CT HEAD    INDICATIONS: Follow-up hemorrhage    TECHNIQUE:  Serial axial images were obtained from the skull base to the vertex   without the use of intravenous contrast.    COMPARISON EXAMINATION: CT head from same 8/12/2023 at 10:15 PM    FINDINGS:  Brain parenchyma/extra-axial compartments: Stable encephalomalacia   involving the anteroinferior right frontal/temporal lobes and left   occipital lobe, and to a lesser extent the paramedian left frontal lobe.   There is layering hyperdensity present along the posterior falx which   measures approximately 4 mm in thickness, slightly decreased in size as   compared to prior exam in June 2023. Redemonstration of streak artifact   at the high right frontal convexity.    The ventricles remain significantly dilated with stable appearance of   periventricular hypodensity is compared to the prior study. The cortical   sulci appear effaced at the vertex with disproportionate enlargement of   the sylvian fissures, unchanged. The basal cisterns are patent.   Craniocervical junction and sella turcica are within normal limits.    Calvarium, paranasal sinuses, and orbits: Evidence ofprior right frontal   craniotomy. Otherwise, the calvarium is intact. Paranasal sinuses and   mastoid air cells are clear. The orbits are within normal limits.      IMPRESSION:  Persistent posterior parafalcine subdural hematoma identified measuring   approximately 4 mm in thickness, without interval change as compared to   prior exam from 6 hours ago. No new mass effect, intraventricular   extension, or new sites of intracranial hemorrhage identified.    Previously suspected subarachnoid hemorrhage at the right frontal high   convexity is favored to represent streak artifact.    Multifocal chronic infarcts present, superimposed upon stable background   white matter hypoattenuation.    Critical findings above related to persistent intracranial hemorrhage   were discussed directly by the ED radiologist on call, Myles Sandhu MD,   with the intensive care unit physician, Dr. Sahni, at 2:30 AM on   8/13/2023.    < end of copied text >    Personally reviewed.     Consultant(s) Notes Reviewed:  [x] YES  [ ] NO     Patient is a 73y old  Male who presents with a chief complaint of Sepsis (14 Aug 2023 11:16)      Subjective:  INTERVAL HPI/OVERNIGHT EVENTS: Patient seen and examined at bedside. No overnight events occurred. Pt wife at bedside and reports patient is looking much better. Patient has no complaints at this time. Patient is mostly non-verbal at baseline and responds to yes or no questions. Unable to assess ROS, but when asked about Pt, patient stated no.     MEDICATIONS  (STANDING):  atorvastatin 40 milliGRAM(s) Oral at bedtime  chlorhexidine 2% Cloths 1 Application(s) Topical every 24 hours  dextrose 5%. 1000 milliLiter(s) (50 mL/Hr) IV Continuous <Continuous>  dextrose 5%. 1000 milliLiter(s) (100 mL/Hr) IV Continuous <Continuous>  dextrose 50% Injectable 25 Gram(s) IV Push once  dextrose 50% Injectable 25 Gram(s) IV Push once  dextrose 50% Injectable 12.5 Gram(s) IV Push once  glucagon  Injectable 1 milliGRAM(s) IntraMuscular once  heparin   Injectable 5000 Unit(s) SubCutaneous every 8 hours  hydrocortisone sodium succinate Injectable 50 milliGRAM(s) IV Push every 8 hours  insulin lispro (ADMELOG) corrective regimen sliding scale   SubCutaneous every 6 hours  mirtazapine 7.5 milliGRAM(s) Oral daily  mupirocin 2% Nasal 1 Application(s) Both Nostrils every 12 hours  pantoprazole  Injectable 40 milliGRAM(s) IV Push daily  piperacillin/tazobactam IVPB.. 3.375 Gram(s) IV Intermittent every 8 hours  sodium chloride 3%  Inhalation 4 milliLiter(s) Inhalation every 12 hours  vancomycin  IVPB 750 milliGRAM(s) IV Intermittent once    MEDICATIONS  (PRN):  dextrose Oral Gel 15 Gram(s) Oral once PRN Blood Glucose LESS THAN 70 milliGRAM(s)/deciliter  sodium chloride 0.9% lock flush 10 milliLiter(s) IV Push every 1 hour PRN Pre/post blood products, medications, blood draw, and to maintain line patency      Allergies    No Known Allergies    Intolerances        REVIEW OF SYSTEMS:  unable to obtain as per HPI    Objective:  Vital Signs Last 24 Hrs  T(C): 36.7 (14 Aug 2023 12:30), Max: 37.8 (13 Aug 2023 19:58)  T(F): 98.1 (14 Aug 2023 12:30), Max: 100 (13 Aug 2023 19:58)  HR: 105 (14 Aug 2023 10:00) (85 - 112)  BP: 98/67 (14 Aug 2023 10:00) (87/51 - 136/79)  BP(mean): 78 (14 Aug 2023 10:00) (64 - 103)  RR: 14 (14 Aug 2023 10:00) (12 - 18)  SpO2: 98% (14 Aug 2023 10:00) (89% - 100%)    Parameters below as of 14 Aug 2023 07:57  Patient On (Oxygen Delivery Method): nasal cannula, 2 LPM        GENERAL: NAD, lying in bed  HEAD:  Atraumatic, Normocephalic  EYES: conjunctiva and sclera clear  ENT: dry mucous membranes  NECK: Supple  CHEST/LUNG: coarse breath sounds, clear lower lateral lung fields, unable to assess posterior lung fields.  HEART: Regular rate and rhythm; No murmurs,  no tachy   ABDOMEN: Bowel sounds present; Soft, Nontender, Nondistended.  EXTREMITIES:  2+ Peripheral Pulses (radial pulse) , brisk capillary refill. No clubbing, cyanosis, or edema  NERVOUS SYSTEM:  Alert & Oriented X1 (name), speech is unclear, unable to follow commands.   MSK: Pt arms in contracted  state likely 2/2 TBI      LABS:                        8.5    9.95  )-----------( 176      ( 14 Aug 2023 08:40 )             26.1     CBC Full  -  ( 14 Aug 2023 08:40 )  WBC Count : 9.95 K/uL  Hemoglobin : 8.5 g/dL  Hematocrit : 26.1 %  Platelet Count - Automated : 176 K/uL  Mean Cell Volume : 75.7 fl  Mean Cell Hemoglobin : 24.6 pg  Mean Cell Hemoglobin Concentration : 32.6 gm/dL  Auto Neutrophil # : x  Auto Lymphocyte # : x  Auto Monocyte # : x  Auto Eosinophil # : x  Auto Basophil # : x  Auto Neutrophil % : x  Auto Lymphocyte % : x  Auto Monocyte % : x  Auto Eosinophil % : x  Auto Basophil % : x    14 Aug 2023 05:22    138    |  104    |  42     ----------------------------<  171    4.0     |  23     |  2.00     Ca    8.2        14 Aug 2023 05:22  Phos  5.0       14 Aug 2023 05:22  Mg     1.7       14 Aug 2023 05:22    TPro  5.4    /  Alb  3.1    /  TBili  0.6    /  DBili  x      /  AST  9      /  ALT  14     /  AlkPhos  51     14 Aug 2023 05:22    PT/INR - ( 13 Aug 2023 04:26 )   PT: 21.5 sec;   INR: 1.87 ratio         PTT - ( 13 Aug 2023 04:26 )  PTT:34.8 sec  Urinalysis Basic - ( 14 Aug 2023 05:22 )    Color: x / Appearance: x / SG: x / pH: x  Gluc: 171 mg/dL / Ketone: x  / Bili: x / Urobili: x   Blood: x / Protein: x / Nitrite: x   Leuk Esterase: x / RBC: x / WBC x   Sq Epi: x / Non Sq Epi: x / Bacteria: x      CAPILLARY BLOOD GLUCOSE      POCT Blood Glucose.: 176 mg/dL (14 Aug 2023 05:04)  POCT Blood Glucose.: 167 mg/dL (13 Aug 2023 23:21)  POCT Blood Glucose.: 99 mg/dL (13 Aug 2023 17:03)        Culture - Blood (collected 08-12-23 @ 23:47)  Source: .Blood Blood-Peripheral  Preliminary Report (08-14-23 @ 07:01):    No growth at 24 hours    Culture - Blood (collected 08-12-23 @ 23:40)  Source: .Blood Blood-Peripheral  Preliminary Report (08-14-23 @ 07:01):    No growth at 24 hours        RADIOLOGY & ADDITIONAL TESTS:  < from: CT Head No Cont (08.13.23 @ 02:02) >  INTERPRETATION:  CT HEAD    INDICATIONS: Follow-up hemorrhage    TECHNIQUE:  Serial axial images were obtained from the skull base to the vertex   without the use of intravenous contrast.    COMPARISON EXAMINATION: CT head from same 8/12/2023 at 10:15 PM    FINDINGS:  Brain parenchyma/extra-axial compartments: Stable encephalomalacia   involving the anteroinferior right frontal/temporal lobes and left   occipital lobe, and to a lesser extent the paramedian left frontal lobe.   There is layering hyperdensity present along the posterior falx which   measures approximately 4 mm in thickness, slightly decreased in size as   compared to prior exam in June 2023. Redemonstration of streak artifact   at the high right frontal convexity.    The ventricles remain significantly dilated with stable appearance of   periventricular hypodensity is compared to the prior study. The cortical   sulci appear effaced at the vertex with disproportionate enlargement of   the sylvian fissures, unchanged. The basal cisterns are patent.   Craniocervical junction and sella turcica are within normal limits.    Calvarium, paranasal sinuses, and orbits: Evidence ofprior right frontal   craniotomy. Otherwise, the calvarium is intact. Paranasal sinuses and   mastoid air cells are clear. The orbits are within normal limits.      IMPRESSION:  Persistent posterior parafalcine subdural hematoma identified measuring   approximately 4 mm in thickness, without interval change as compared to   prior exam from 6 hours ago. No new mass effect, intraventricular   extension, or new sites of intracranial hemorrhage identified.    Previously suspected subarachnoid hemorrhage at the right frontal high   convexity is favored to represent streak artifact.    Multifocal chronic infarcts present, superimposed upon stable background   white matter hypoattenuation.    Critical findings above related to persistent intracranial hemorrhage   were discussed directly by the ED radiologist on call, Myles Sandhu MD,   with the intensive care unit physician, Dr. Sahni, at 2:30 AM on   8/13/2023.    < end of copied text >    Personally reviewed.     Consultant(s) Notes Reviewed:  [x] YES  [ ] NO

## 2023-08-14 NOTE — PROGRESS NOTE ADULT - PROBLEM SELECTOR PLAN 2
Baseline 0.6-0.8, per chart review  - Cr 1.5 on admission and worsening (2.0 today)  - dced Bicarb gtt for metabolic acidosis - now resolved  - Further management per ICU

## 2023-08-14 NOTE — PROGRESS NOTE ADULT - SUBJECTIVE AND OBJECTIVE BOX
Patient is a 73y old  Male who presents with a chief complaint of Sepsis (14 Aug 2023 11:09)    24 hour events: Notified for asymptomatic elevated lactate 6.7/acidosis overnight, treated with bicarbonate IV, resolved as of this AM.    Pt seen at bedside today accompanied by wife. Pt has no complaints, and per wife, pt is at baseline a poor historian. Denies fevers/chills, HA, cp, cough, sob, abdominal pain, n/v/d.    REVIEW OF SYSTEMS  Constitutional: No fever, chills  Neuro: No headache, numbness  Resp: No cough, shortness of breath  CVS: No chest pain, palpitations  GI: No abdominal pain, nausea, vomiting, diarrhea   : No dysuria, frequency  Msk: No myalgias    T(F): 97.9 (08-14-23 @ 08:16), Max: 100 (08-13-23 @ 19:58)  HR: 105 (08-14-23 @ 10:00) (85 - 112)  BP: 98/67 (08-14-23 @ 10:00) (72/51 - 136/79)  RR: 14 (08-14-23 @ 10:00) (12 - 23)  SpO2: 98% (08-14-23 @ 10:00) (89% - 100%)  Wt(kg): --            I&O's Summary    08-13 @ 07:01  -  08-14 @ 07:00  --------------------------------------------------------  IN: 3063.1 mL / OUT: 2005 mL / NET: 1058.1 mL    08-14 @ 07:01  -  08-14 @ 11:18  --------------------------------------------------------  IN: 25 mL / OUT: 235 mL / NET: -210 mL      PHYSICAL EXAM  General: well-appearing  CNS: A&o x1 (person only, c/w baseline). Responds in clear speech to questioning, mood appropriate, somewhat flat affect.  HEENT: NCAT. sclera anicteric, conjunctivae clear. R facial droop.   Resp: Lungs CTA A/P B/L, no wheezing, rhonchi, or rales.  CVS: RRR, normal S1/S2. No murmurs.  Abd: + Diffuse tenderness to palpation, predominantly in upper quadrants. Abdomen soft, nondistended.  Ext: B/L UE incr. tone, flexion synergy  Skin:     MEDICATIONS  piperacillin/tazobactam IVPB.. IV Intermittent  vancomycin  IVPB IV Intermittent      atorvastatin Oral  dextrose 50% Injectable IV Push  dextrose 50% Injectable IV Push  dextrose 50% Injectable IV Push  dextrose Oral Gel Oral PRN  glucagon  Injectable IntraMuscular  hydrocortisone sodium succinate Injectable IV Push  insulin lispro (ADMELOG) corrective regimen sliding scale SubCutaneous    sodium chloride 3%  Inhalation Inhalation    mirtazapine Oral      heparin   Injectable SubCutaneous    pantoprazole  Injectable IV Push      dextrose 5%. IV Continuous  dextrose 5%. IV Continuous  sodium chloride 0.9% lock flush IV Push PRN      chlorhexidine 2% Cloths Topical  mupirocin 2% Nasal Both Nostrils                            8.5    9.95  )-----------( 176      ( 14 Aug 2023 08:40 )             26.1       08-14    138  |  104  |  42<H>  ----------------------------<  171<H>  4.0   |  23  |  2.00<H>    Ca    8.2<L>      14 Aug 2023 05:22  Phos  5.0     08-14  Mg     1.7     08-14    TPro  5.4<L>  /  Alb  3.1<L>  /  TBili  0.6  /  DBili  x   /  AST  9<L>  /  ALT  14  /  AlkPhos  51  08-14    Lactate 1.5           08-14 @ 05:22    Lactate 5.0           08-13 @ 18:30    Lactate 6.7           08-13 @ 14:56      CARDIAC MARKERS ( 12 Aug 2023 21:50 )  x     / x     / x     / x     / 1.0 ng/mL      PT/INR - ( 13 Aug 2023 04:26 )   PT: 21.5 sec;   INR: 1.87 ratio         PTT - ( 13 Aug 2023 04:26 )  PTT:34.8 sec  Urinalysis Basic - ( 14 Aug 2023 05:22 )    Color: x / Appearance: x / SG: x / pH: x  Gluc: 171 mg/dL / Ketone: x  / Bili: x / Urobili: x   Blood: x / Protein: x / Nitrite: x   Leuk Esterase: x / RBC: x / WBC x   Sq Epi: x / Non Sq Epi: x / Bacteria: x      .Blood Blood-Peripheral   No growth at 24 hours -- 08-12 @ 23:47  .Blood Blood-Peripheral   No growth at 24 hours -- 08-12 @ 23:40      Rapid RVP Result: NotDetec (08-13 @ 03:50)    Radiology: ***< from: CT Head No Cont (08.13.23 @ 02:02) >  Persistent posterior parafalcine subdural hematoma identified measuring   approximately 4 mm in thickness, without interval change as compared to   prior exam from 6 hours ago. No new mass effect, intraventricular   extension, or new sites of intracranial hemorrhage identified.    Previously suspected subarachnoid hemorrhage at the right frontal high   convexity is favored to represent streak artifact.    Multifocal chronic infarcts present, superimposed upon stable background   white matter hypoattenuation.    Critical findings above related to persistent intracranial hemorrhage   were discussed directly by the ED radiologist on call, Myles Sandhu MD,   with the intensive care unit physician, Dr. Sahni, at 2:30 AM on   8/13/2023.    < end of copied text >      [refer to prev.]  CENTRAL LINE: Y          DATE INSERTED:              REMOVE: Y/N  [place]: Y                        DATE INSERTED:              REMOVE: Y/N  A-LINE: N                           GLOBAL ISSUE/BEST PRACTICE [Y/N Answers]  Analgesia:   Sedation:   HOB elevation: yes  Stress ulcer prophylaxis:   VTE prophylaxis:   Glycemic control:   Nutrition: [What the diet is]    CODE STATUS: DNR/Trial of Intubation       Patient is a 73y old  Male who presents with a chief complaint of Sepsis (14 Aug 2023 11:09)    24 hour events: Notified for asymptomatic elevated lactate 6.7/acidosis overnight, treated with bicarbonate IV, resolved as of this AM.    Pt seen at bedside today accompanied by wife. Pt has no complaints, and per wife, pt is at baseline a poor historian. Denies fevers/chills, HA, cp, cough, sob, abdominal pain, n/v/d.    REVIEW OF SYSTEMS  Constitutional: No fever, chills  Neuro: No headache, numbness  Resp: No cough, shortness of breath  CVS: No chest pain, palpitations  GI: No abdominal pain, nausea, vomiting, diarrhea   : No dysuria, frequency  Msk: No myalgias    T(F): 97.9 (08-14-23 @ 08:16), Max: 100 (08-13-23 @ 19:58)  HR: 105 (08-14-23 @ 10:00) (85 - 112)  BP: 98/67 (08-14-23 @ 10:00) (72/51 - 136/79)  RR: 14 (08-14-23 @ 10:00) (12 - 23)  SpO2: 98% (08-14-23 @ 10:00) (89% - 100%)  Wt(kg): --            I&O's Summary    08-13 @ 07:01  -  08-14 @ 07:00  --------------------------------------------------------  IN: 3063.1 mL / OUT: 2005 mL / NET: 1058.1 mL    08-14 @ 07:01  -  08-14 @ 11:18  --------------------------------------------------------  IN: 25 mL / OUT: 235 mL / NET: -210 mL      PHYSICAL EXAM  General: well-appearing  CNS: A&o x1 (person only, c/w baseline). Responds in clear speech to questioning, mood appropriate, somewhat flat affect.  HEENT: NCAT. sclera anicteric, conjunctivae clear. R facial droop.   Resp: Lungs CTA A/P B/L, no wheezing, rhonchi, or rales.  CVS: RRR, normal S1/S2. No murmurs.  Abd: + Diffuse tenderness to palpation, predominantly in upper quadrants. Abdomen soft, nondistended.  Ext: B/L UE incr. tone, flexion synergy. No C/C/E.  Skin: no extremity rashes or erythema    MEDICATIONS  piperacillin/tazobactam IVPB.. IV Intermittent  vancomycin  IVPB IV Intermittent      atorvastatin Oral  dextrose 50% Injectable IV Push  dextrose 50% Injectable IV Push  dextrose 50% Injectable IV Push  dextrose Oral Gel Oral PRN  glucagon  Injectable IntraMuscular  hydrocortisone sodium succinate Injectable IV Push  insulin lispro (ADMELOG) corrective regimen sliding scale SubCutaneous    sodium chloride 3%  Inhalation Inhalation    mirtazapine Oral      heparin   Injectable SubCutaneous    pantoprazole  Injectable IV Push      dextrose 5%. IV Continuous  dextrose 5%. IV Continuous  sodium chloride 0.9% lock flush IV Push PRN      chlorhexidine 2% Cloths Topical  mupirocin 2% Nasal Both Nostrils                            8.5    9.95  )-----------( 176      ( 14 Aug 2023 08:40 )             26.1       08-14    138  |  104  |  42<H>  ----------------------------<  171<H>  4.0   |  23  |  2.00<H>    Ca    8.2<L>      14 Aug 2023 05:22  Phos  5.0     08-14  Mg     1.7     08-14    TPro  5.4<L>  /  Alb  3.1<L>  /  TBili  0.6  /  DBili  x   /  AST  9<L>  /  ALT  14  /  AlkPhos  51  08-14    Lactate 1.5           08-14 @ 05:22    Lactate 5.0           08-13 @ 18:30    Lactate 6.7           08-13 @ 14:56      CARDIAC MARKERS ( 12 Aug 2023 21:50 )  x     / x     / x     / x     / 1.0 ng/mL      PT/INR - ( 13 Aug 2023 04:26 )   PT: 21.5 sec;   INR: 1.87 ratio         PTT - ( 13 Aug 2023 04:26 )  PTT:34.8 sec  Urinalysis Basic - ( 14 Aug 2023 05:22 )    Color: x / Appearance: x / SG: x / pH: x  Gluc: 171 mg/dL / Ketone: x  / Bili: x / Urobili: x   Blood: x / Protein: x / Nitrite: x   Leuk Esterase: x / RBC: x / WBC x   Sq Epi: x / Non Sq Epi: x / Bacteria: x      .Blood Blood-Peripheral   No growth at 24 hours -- 08-12 @ 23:47  .Blood Blood-Peripheral   No growth at 24 hours -- 08-12 @ 23:40      Rapid RVP Result: NotDetec (08-13 @ 03:50)    Radiology: < from: CT Head No Cont (08.13.23 @ 02:02) >  Persistent posterior parafalcine subdural hematoma identified measuring   approximately 4 mm in thickness, without interval change as compared to   prior exam from 6 hours ago. No new mass effect, intraventricular   extension, or new sites of intracranial hemorrhage identified.    Previously suspected subarachnoid hemorrhage at the right frontal high   convexity is favored to represent streak artifact.    Multifocal chronic infarcts present, superimposed upon stable background   white matter hypoattenuation.    Critical findings above related to persistent intracranial hemorrhage   were discussed directly by the ED radiologist on call, Myles Sandhu MD,   with the intensive care unit physician, Dr. Sahni, at 2:30 AM on   8/13/2023.    < end of copied text >      CENTRAL LINE: Y          DATE INSERTED:  8.13.23             REMOVE: N - to remove today  [place]: Y                        DATE INSERTED: 8.13.23             REMOVE: N  A-LINE: N    GLOBAL ISSUE/BEST PRACTICE   Analgesia: N  Sedation: N  HOB elevation: Y  Stress ulcer prophylaxis:  Y  VTE prophylaxis: Y  Glycemic control: Y  Nutrition: NPO    CODE STATUS: DNR/Trial of Intubation

## 2023-08-14 NOTE — DIETITIAN INITIAL EVALUATION ADULT - PROBLEM SELECTOR PLAN 7
Home regimen: Losartan, Hydralazine, Diltiazem  - Hold home Losartan, diltiazem and Hydralazine given hypotension/septic shock

## 2023-08-14 NOTE — PROGRESS NOTE ADULT - PROBLEM SELECTOR PLAN 6
Well controlled, on Metformin 1000mg BID. Not on home insulin  - P/w lactic acidosis in setting of acute illness, likely component of Metformin side effect as well  - hold oral anti-diabetic meds  - A1C: 5.5 in Jun 2023  - Low ISS  - Regular finger sticks  - NPO  - Hypoglycemic protocol.  - Further management per ICU Well controlled, on Metformin 1000mg BID. Not on home insulin  - P/w lactic acidosis in setting of acute illness, likely component of Metformin side effect as well  - hold oral anti-diabetic meds  - A1C: 5.5 in Jun 2023  - Low ISS Regular finger sticks  - NPO  - Hypoglycemic protocol.  - Further management per ICU

## 2023-08-14 NOTE — DIETITIAN INITIAL EVALUATION ADULT - ADD RECOMMEND
You can access the FollowMyHealth Patient Portal offered by [facility] by registering at the following website: [email or web address]. By joining [facility] [employer] [facility]’s FollowMyHealth portal, you will also be able to view your health information using other applications (apps) compatible with our system. diet texture per SLP

## 2023-08-14 NOTE — PROGRESS NOTE ADULT - ATTENDING COMMENTS
pt seen and examine today see  above plan - 74 y/o M with PMHx DM2, HTN, HLD A-fib (on Eliquis), TBI (s/p SDH with EVAC on 9/2021), and traumatic subdural hemorrhage in 10/2022, BIBEMS with nausea and vomiting. Admitted to ICU for sepsis  poa 2/2 PNA, LATOYA and metabolic acidosis. Prognosis  guarded.

## 2023-08-14 NOTE — DIETITIAN INITIAL EVALUATION ADULT - PROBLEM SELECTOR PLAN 4
CT A/P: Colitis of distal sigmoid colon  - Diarrhea likely 2/2 inflammation vs infectious colitis. Low suspicion for C. diff  - GI PCR  - Bowel rest, IVF  - Speech and swallow eval   - Further management per ICU

## 2023-08-14 NOTE — CONSULT NOTE ADULT - SUBJECTIVE AND OBJECTIVE BOX
Patient is a 73y old  Male who presents with a chief complaint of Pneumonia due to infectious organism     (14 Aug 2023 08:38)      HPI:  72 y/o M with PMHx DM2, HTN, HLD A-fib (on Eliquis), TBI (s/p SDH with EVAC on 9/2021), and traumatic subdural hemorrhage in 10/2022, BIBEMS with hypoxia, nausea and vomiting. Patient was constipated for almost 2 weeks before wife gave him Miralax. He subsequently developed diarrhea for several days. This afternoon patient vomited and was more lethargic than normal. The home health aide was caring for the patient until the wife returned around 5:30pm and found him lying down and difficult to arouse. The wife called EMS. Currently, patient reports SOB, diffuse pain, and diarrhea. He is mostly non-verbal but is able to respond to yes/no questions when coached or with eye-blinking ques. The wife denies any sick contacts or medication changes. No BMs since admission.    Vitals: T 98.7F, HR 78 --> 131 (Afib), /63 --> 76/52, RR 16, SpO2 98% NRB  Labs: WBC 45, Lactate 14, CO2 11, BUN 31, Cr 1.5, Glucose 66, GFR 49, pro-BNP 1654  VBG: pH 7.2, pCO2 23, pO2 129, HCO3 9  EKG: Afib @ 98bpm, RVH    CT Head:   Persistent posterior parafalcine subdural hematoma identified measuring   approximately 4 mm in thickness, without interval change as compared to   prior exam from 6 hours ago. No new mass effect, intraventricular   extension, or new sites of intracranial hemorrhage identified.    Previously suspected subarachnoid hemorrhage at the right frontal high   convexity is favored to represent streak artifact.    Multifocal chronic infarcts present, superimposed upon stable background   white matter hypoattenuation.    CT A/P:   Bilateral pulmonary groundglass opacities likely reflects atypical   pneumonia.  Thickening versus underdistention of the distal sigmoid colon. Correlate   for symptoms of colitis.     (13 Aug 2023 04:03)      PAST MEDICAL & SURGICAL HISTORY:  TBI (traumatic brain injury)      HTN (hypertension)      Atrial fibrillation      Peripheral neuropathy      Major depression      HLD (hyperlipidemia)      CAD (coronary artery disease)      BPH (benign prostatic hyperplasia)      Pneumonia due to COVID-19 virus  June 2022      Hemorrhage in the brain      H/O craniotomy                ECHO  FINDINGS:      MEDICATIONS  (STANDING):  atorvastatin 40 milliGRAM(s) Oral at bedtime  chlorhexidine 2% Cloths 1 Application(s) Topical every 24 hours  dextrose 5%. 1000 milliLiter(s) (100 mL/Hr) IV Continuous <Continuous>  dextrose 5%. 1000 milliLiter(s) (50 mL/Hr) IV Continuous <Continuous>  dextrose 50% Injectable 25 Gram(s) IV Push once  dextrose 50% Injectable 25 Gram(s) IV Push once  dextrose 50% Injectable 12.5 Gram(s) IV Push once  glucagon  Injectable 1 milliGRAM(s) IntraMuscular once  heparin   Injectable 5000 Unit(s) SubCutaneous every 8 hours  hydrocortisone sodium succinate Injectable 50 milliGRAM(s) IV Push every 8 hours  insulin lispro (ADMELOG) corrective regimen sliding scale   SubCutaneous every 6 hours  mirtazapine 7.5 milliGRAM(s) Oral daily  mupirocin 2% Nasal 1 Application(s) Both Nostrils every 12 hours  pantoprazole  Injectable 40 milliGRAM(s) IV Push daily  piperacillin/tazobactam IVPB.. 3.375 Gram(s) IV Intermittent every 8 hours  sodium chloride 3%  Inhalation 4 milliLiter(s) Inhalation every 12 hours  vancomycin  IVPB 750 milliGRAM(s) IV Intermittent once    MEDICATIONS  (PRN):  dextrose Oral Gel 15 Gram(s) Oral once PRN Blood Glucose LESS THAN 70 milliGRAM(s)/deciliter  sodium chloride 0.9% lock flush 10 milliLiter(s) IV Push every 1 hour PRN Pre/post blood products, medications, blood draw, and to maintain line patency      FAMILY HISTORY:  FH: HTN (hypertension) (Father, Mother, Sibling)    Family history of bone cancer (Sibling)    FH: Alzheimers disease (Sibling)      Denies Family history of CAD or early MI    ROS:  Constitutional: denies fever, chills  HEENT: denies blurry vision, difficulty hearing  Respiratory: denies SOB, STACK, cough  Cardiovascular: denies CP, palpitations, orthopnea, PND, LE edema  Gastrointestinal: denies nausea, vomiting, abdominal pain  Genitourinary: denies urinary changes  Skin: Denies rashes, itching  Neurologic: denies headache, weakness, dizziness  Hematology/Oncology: denies bleeding, easy bruising  ROS negative except as noted above      SOCIAL HISTORY:    No tobacco, Alcohol or Drug use    Vital Signs Last 24 Hrs  T(C): 36.6 (14 Aug 2023 08:16), Max: 37.8 (13 Aug 2023 19:58)  T(F): 97.9 (14 Aug 2023 08:16), Max: 100 (13 Aug 2023 19:58)  HR: 105 (14 Aug 2023 10:00) (85 - 112)  BP: 98/67 (14 Aug 2023 10:00) (72/51 - 136/79)  BP(mean): 78 (14 Aug 2023 10:00) (57 - 103)  RR: 14 (14 Aug 2023 10:00) (12 - 23)  SpO2: 98% (14 Aug 2023 10:00) (89% - 100%)    Parameters below as of 14 Aug 2023 07:57  Patient On (Oxygen Delivery Method): nasal cannula, 2 LPM        Physical Exam:  General: Well developed, well nourished, NAD  HEENT: NCAT, moist mucous membranes   Neurology: A&Ox3, nonfocal, sensation intact   Respiratory: CTA B/L, No W/R/R  CV: RRR, +S1/S2, no murmurs, rubs or gallops  Abdominal: Soft, NT, ND +BS, no palpable masses  Extremities: No LE edema or calf tenderness  MSK: Normal ROM, no joint erythema or warmth, no joint swelling   Heme: No obvious ecchymosis or petechiae   Skin: warm, dry, normal color      ECG:    I&O's Detail    13 Aug 2023 07:01  -  14 Aug 2023 07:00  --------------------------------------------------------  IN:    IV PiggyBack: 250 mL    IV PiggyBack: 200 mL    IV PiggyBack: 375 mL    Lactated Ringers Bolus: 1000 mL    Norepinephrine: 338.1 mL    Sodium Bicarbonate: 900 mL  Total IN: 3063.1 mL    OUT:    Indwelling Catheter - Urethral (mL): 2005 mL  Total OUT: 2005 mL    Total NET: 1058.1 mL      14 Aug 2023 07:01  -  14 Aug 2023 11:12  --------------------------------------------------------  IN:    IV PiggyBack: 25 mL  Total IN: 25 mL    OUT:    Indwelling Catheter - Urethral (mL): 235 mL  Total OUT: 235 mL    Total NET: -210 mL          LABS:                        8.5    9.95  )-----------( 176      ( 14 Aug 2023 08:40 )             26.1     08-14    138  |  104  |  42<H>  ----------------------------<  171<H>  4.0   |  23  |  2.00<H>    Ca    8.2<L>      14 Aug 2023 05:22  Phos  5.0     08-14  Mg     1.7     08-14    TPro  5.4<L>  /  Alb  3.1<L>  /  TBili  0.6  /  DBili  x   /  AST  9<L>  /  ALT  14  /  AlkPhos  51  08-14    CARDIAC MARKERS ( 12 Aug 2023 21:50 )  x     / x     / x     / x     / 1.0 ng/mL      PT/INR - ( 13 Aug 2023 04:26 )   PT: 21.5 sec;   INR: 1.87 ratio         PTT - ( 13 Aug 2023 04:26 )  PTT:34.8 sec  Urinalysis Basic - ( 14 Aug 2023 05:22 )    Color: x / Appearance: x / SG: x / pH: x  Gluc: 171 mg/dL / Ketone: x  / Bili: x / Urobili: x   Blood: x / Protein: x / Nitrite: x   Leuk Esterase: x / RBC: x / WBC x   Sq Epi: x / Non Sq Epi: x / Bacteria: x      I&O's Summary    13 Aug 2023 07:01  -  14 Aug 2023 07:00  --------------------------------------------------------  IN: 3063.1 mL / OUT: 2005 mL / NET: 1058.1 mL    14 Aug 2023 07:01  -  14 Aug 2023 11:12  --------------------------------------------------------  IN: 25 mL / OUT: 235 mL / NET: -210 mL      BNP  RADIOLOGY & ADDITIONAL STUDIES: Patient is a 73y old  Male who presents with a chief complaint of Pneumonia due to infectious organism     (14 Aug 2023 08:38)      HPI:  74 y/o M with PMHx DM2, HTN, HLD A-fib (on Eliquis), TBI (s/p SDH with EVAC on 2021), and traumatic subdural hemorrhage in 10/2022, BIBEMS with hypoxia, nausea and vomiting. Patient was constipated for almost 2 weeks before wife gave him Miralax. He subsequently developed diarrhea for several days. This afternoon patient vomited and was more lethargic than normal. The home health aide was caring for the patient until the wife returned around 5:30pm and found him lying down and difficult to arouse. The wife called EMS. Currently, patient reports SOB, diffuse pain, and diarrhea. He is mostly non-verbal but is able to respond to yes/no questions when coached or with eye-blinking ques. The wife denies any sick contacts or medication changes. No BMs since admission.    Vitals: T 98.7F, HR 78 --> 131 (Afib), /63 --> 76/52, RR 16, SpO2 98% NRB  Labs: WBC 45, Lactate 14, CO2 11, BUN 31, Cr 1.5, Glucose 66, GFR 49, pro-BNP 1654  VBG: pH 7.2, pCO2 23, pO2 129, HCO3 9  EKG: Afib @ 98bpm, RVH    CT Head:   Persistent posterior parafalcine subdural hematoma identified measuring   approximately 4 mm in thickness, without interval change as compared to   prior exam from 6 hours ago. No new mass effect, intraventricular   extension, or new sites of intracranial hemorrhage identified.    Previously suspected subarachnoid hemorrhage at the right frontal high   convexity is favored to represent streak artifact.    Multifocal chronic infarcts present, superimposed upon stable background   white matter hypoattenuation.    CT A/P:   Bilateral pulmonary groundglass opacities likely reflects atypical   pneumonia.  Thickening versus underdistention of the distal sigmoid colon. Correlate   for symptoms of colitis.     (13 Aug 2023 04:03)    Interval Hx: Patient was examined at bedside and stated "I was good" but was unable to respond to follow up questions. On telemetry, patient was in Afib with HR in the 120s.  Cardiologist: Dr. Mirta Goldsmith    PAST MEDICAL & SURGICAL HISTORY:  TBI (traumatic brain injury)      HTN (hypertension)      Atrial fibrillation      Peripheral neuropathy      Major depression      HLD (hyperlipidemia)      CAD (coronary artery disease)      BPH (benign prostatic hyperplasia)      Pneumonia due to COVID-19 virus  2022      Hemorrhage in the brain      H/O craniotomy                ECHO FINDINGS:   < from: TTE Echo Complete w/ Contrast w/ Doppler (23 @ 10:03) >    ACC: [id number] EXAM:  ECHO TTE WITH CON COMP W DOPP                          PROCEDURE DATE:  2023          INTERPRETATION:  TRANSTHORACIC ECHOCARDIOGRAM REPORT        Patient Name:   MELBA PARMAR Patient Location: Inpatient  Medical Rec #:[id number]       Accession #:      [id number]  Account #:                     Height:           68.1 in 173.0 cm  YOB: 1950      Weight:           123.5 lb 56.00 kg  Patient Age:    73 years       BSA:              1.67 m²  Patient Gender: M              BP:               134/98 mmHg      Date of Exam:        2023 10:03:12 AM  Sonographer:         Simin Herron  Referring Physician: Matias Edmondson    Procedure:     2D Echo/Doppler/Color Doppler Complete.  Indications:   I48.91 - Unspecified atrial fibrillation  Diagnosis:     I48.91 - Unspecified atrial fibrillation  Study Details: Technically difficult study. Study quality was adversely   affected                 due to body habitus and the patient being uncooperative.        2D AND M-MODE MEASUREMENTS (normal ranges within parentheses):  Left                 Normal   Aorta/Left            Normal  Ventricle:                    Atrium:  IVSd (2D):    1.21  (0.7-1.1) Aortic Root    3.40  (2.4-3.7)                 cm     (2D):           cm  LVPWd (2D):   1.23  (0.7-1.1) Left Atrium    3.49  (1.9-4.0)                 cm             (2D):           cm  LVIDd (2D):   3.59  (3.4-5.7) LA Volume      25.7                 cm             Index         ml/m²  LVIDs (2D):  2.45                 cm  LV FS (2D):   31.8   (>25%)                  %  LV EF (2D):   61 %   (>55%)  Relative Wall 0.69   (<0.42)  Thickness    LV SYSTOLIC FUNCTION BY 2D PLANIMETRY (MOD):  EF-A4C View: 63.7 % EF-A2C View: 63.2 % EF-Biplane: 65 %    SPECTRAL DOPPLER ANALYSIS (where applicable):  Aortic Valve: AoV Max Bhaskar: 0.94 m/s AoV Peak PG: 3.6 mmHg AoV Mean P.8 mmHg    LVOT Vmax: 0.67 m/s LVOT VTI: 0.113 m LVOT Diameter: 2.37 cm    AoV Area, Vmax: 3.12 cm² AoV Area, VTI: 4.31 cm² AoVArea, Vmn: 3.19 cm²  Ao VTI: 0.116    PHYSICIAN INTERPRETATION:  Left Ventricle: The left ventricular internal cavity size is normal. Left   ventricular wall thickness is normal.  Global LV systolic function was normal. Left ventricular ejection   fraction, by visual estimation, is 60 to 65%. The mitral in-flow pattern   reveals no discernable A-wave, therefore no comment on diastolic function   can be made.  Right Ventricle: Normal right ventricular size and function.  Left Atrium: Normal left atrial size.  Right Atrium: Normal right atrial size.  Pericardium: There is no evidence of pericardial effusion.  Mitral Valve: There is mild mitral annular calcification. Trace mitral   valve regurgitation is seen.  Tricuspid Valve: The tricuspid valve is normal in structure. Adequate TR   velocity was not obtained to accurately assess RVSP.  Aortic Valve: The aortic valve is trileaflet. Sclerotic aortic valve with   normal opening. No evidence of aortic valve regurgitation is seen.  Pulmonic Valve: The pulmonic valve was not well visualized.  Aorta: The aortic root is normal in size and structure.  Pulmonary Artery: The pulmonary artery is not well seen.  Venous: The inferior vena cava is not well visualized.      Summary:   1. Left ventricular ejection fraction, by visual estimation, is 60 to   65%.   2. Normal global left ventricular systolic function.   3. The mitral in-flow pattern reveals no discernable A-wave, therefore   no comment on diastolic function can be made.   4. Normal left atrial size.   5. Normal right atrial size.   6. Mild mitral annular calcification.   7. Trace mitral valve regurgitation.   8. Sclerotic aortic valve with normal opening.    MD Scot Electronically signed on 2023 at 11:46:08 AM            *** Final ***    < end of copied text >        MEDICATIONS  (STANDING):  atorvastatin 40 milliGRAM(s) Oral at bedtime  chlorhexidine 2% Cloths 1 Application(s) Topical every 24 hours  dextrose 5%. 1000 milliLiter(s) (100 mL/Hr) IV Continuous <Continuous>  dextrose 5%. 1000 milliLiter(s) (50 mL/Hr) IV Continuous <Continuous>  dextrose 50% Injectable 25 Gram(s) IV Push once  dextrose 50% Injectable 25 Gram(s) IV Push once  dextrose 50% Injectable 12.5 Gram(s) IV Push once  glucagon  Injectable 1 milliGRAM(s) IntraMuscular once  heparin   Injectable 5000 Unit(s) SubCutaneous every 8 hours  hydrocortisone sodium succinate Injectable 50 milliGRAM(s) IV Push every 8 hours  insulin lispro (ADMELOG) corrective regimen sliding scale   SubCutaneous every 6 hours  mirtazapine 7.5 milliGRAM(s) Oral daily  mupirocin 2% Nasal 1 Application(s) Both Nostrils every 12 hours  pantoprazole  Injectable 40 milliGRAM(s) IV Push daily  piperacillin/tazobactam IVPB.. 3.375 Gram(s) IV Intermittent every 8 hours  sodium chloride 3%  Inhalation 4 milliLiter(s) Inhalation every 12 hours  vancomycin  IVPB 750 milliGRAM(s) IV Intermittent once    MEDICATIONS  (PRN):  dextrose Oral Gel 15 Gram(s) Oral once PRN Blood Glucose LESS THAN 70 milliGRAM(s)/deciliter  sodium chloride 0.9% lock flush 10 milliLiter(s) IV Push every 1 hour PRN Pre/post blood products, medications, blood draw, and to maintain line patency      FAMILY HISTORY:  FH: HTN (hypertension) (Father, Mother, Sibling)    Family history of bone cancer (Sibling)    FH: Alzheimers disease (Sibling)      Denies Family history of CAD or early MI    ROS: Unable to obtain secondary to mental status      SOCIAL HISTORY:    No tobacco, Alcohol or Drug use    Vital Signs Last 24 Hrs  T(C): 36.6 (14 Aug 2023 08:16), Max: 37.8 (13 Aug 2023 19:58)  T(F): 97.9 (14 Aug 2023 08:16), Max: 100 (13 Aug 2023 19:58)  HR: 105 (14 Aug 2023 10:00) (85 - 112)  BP: 98/67 (14 Aug 2023 10:00) (72/51 - 136/79)  BP(mean): 78 (14 Aug 2023 10:00) (57 - 103)  RR: 14 (14 Aug 2023 10:00) (12 - 23)  SpO2: 98% (14 Aug 2023 10:00) (89% - 100%)    Parameters below as of 14 Aug 2023 07:57  Patient On (Oxygen Delivery Method): nasal cannula, 2 LPM        Physical Exam:  General: NAD, awake and alert  HEENT: NCAT, moist mucous membranes   Neurology: Minimally verbal, unable to follow commands or answer questions,   Respiratory: CTA B/L, No W/R/R  CV: Irregular rate and rhythm, no murmurs, rubs or gallops  Abdominal: Soft, NT, ND, no palpable masses  Extremities: 1+ LE pedal edema b/l, no calf tenderness  MSK: Contracted UE b/l, no joint erythema or warmth, no joint swelling   Heme: No obvious ecchymosis or petechiae   Skin: warm, dry, normal color      ECG:  < from: 12 Lead ECG (23 @ 20:39) >  Ventricular Rate 98 BPM    QRS Duration 76 ms    Q-T Interval 342 ms    QTC Calculation(Bazett) 436 ms    R Axis 213 degrees    T Axis 19 degrees    Diagnosis Line Atrial fibrillation  Right ventricular hypertrophy  Inferior infarct (cited on or before 22-MAY-2022)  Anterolateral infarct (cited on or before 22-MAY-2022)  Abnormal ECG  When compared with ECG of 10-IVAN-2023 20:29,  Questionable change in initial forces of Septal leads  Confirmed by Lexi Andujar ([id number]) on 2023 7:25:42 AM     < end of copied text >      I&O's Detail    13 Aug 2023 07:  -  14 Aug 2023 07:00  --------------------------------------------------------  IN:    IV PiggyBack: 250 mL    IV PiggyBack: 200 mL    IV PiggyBack: 375 mL    Lactated Ringers Bolus: 1000 mL    Norepinephrine: 338.1 mL    Sodium Bicarbonate: 900 mL  Total IN: 3063.1 mL    OUT:    Indwelling Catheter - Urethral (mL): 2005 mL  Total OUT:  mL    Total NET: 1058.1 mL      14 Aug 2023 07:01  -  14 Aug 2023 11:12  --------------------------------------------------------  IN:    IV PiggyBack: 25 mL  Total IN: 25 mL    OUT:    Indwelling Catheter - Urethral (mL): 235 mL  Total OUT: 235 mL    Total NET: -210 mL          LABS:                        8.5    9.95  )-----------( 176      ( 14 Aug 2023 08:40 )             26.1     08-    138  |  104  |  42<H>  ----------------------------<  171<H>  4.0   |  23  |  2.00<H>    Ca    8.2<L>      14 Aug 2023 05:22  Phos  5.0     08-  Mg     1.7         TPro  5.4<L>  /  Alb  3.1<L>  /  TBili  0.6  /  DBili  x   /  AST  9<L>  /  ALT  14  /  AlkPhos  51  08-14    CARDIAC MARKERS ( 12 Aug 2023 21:50 )  x     / x     / x     / x     / 1.0 ng/mL      PT/INR - ( 13 Aug 2023 04:26 )   PT: 21.5 sec;   INR: 1.87 ratio         PTT - ( 13 Aug 2023 04:26 )  PTT:34.8 sec  Urinalysis Basic - ( 14 Aug 2023 05:22 )    Color: x / Appearance: x / SG: x / pH: x  Gluc: 171 mg/dL / Ketone: x  / Bili: x / Urobili: x   Blood: x / Protein: x / Nitrite: x   Leuk Esterase: x / RBC: x / WBC x   Sq Epi: x / Non Sq Epi: x / Bacteria: x      I&O's Summary    13 Aug 2023 07:01  -  14 Aug 2023 07:00  --------------------------------------------------------  IN: 3063.1 mL / OUT:  mL / NET: 1058.1 mL    14 Aug 2023 07:01  -  14 Aug 2023 11:12  --------------------------------------------------------  IN: 25 mL / OUT: 235 mL / NET: -210 mL      BNP  RADIOLOGY & ADDITIONAL STUDIES: Patient is a 73y old  Male who presents with a chief complaint of Pneumonia due to infectious organism     (14 Aug 2023 08:38)      HPI:  72 y/o M with PMHx DM2, HTN, HLD A-fib (on Eliquis), TBI (s/p SDH with EVAC on 2021), and traumatic subdural hemorrhage in 10/2022, BIBEMS with hypoxia, nausea and vomiting. Patient was constipated for almost 2 weeks before wife gave him Miralax. He subsequently developed diarrhea for several days. This afternoon patient vomited and was more lethargic than normal. The home health aide was caring for the patient until the wife returned around 5:30pm and found him lying down and difficult to arouse. The wife called EMS. Currently, patient reports SOB, diffuse pain, and diarrhea. He is mostly non-verbal but is able to respond to yes/no questions when coached or with eye-blinking ques. The wife denies any sick contacts or medication changes. No BMs since admission.    Vitals: T 98.7F, HR 78 --> 131 (Afib), /63 --> 76/52, RR 16, SpO2 98% NRB  Labs: WBC 45, Lactate 14, CO2 11, BUN 31, Cr 1.5, Glucose 66, GFR 49, pro-BNP 1654  VBG: pH 7.2, pCO2 23, pO2 129, HCO3 9  EKG: Afib @ 98bpm, RVH    CT Head:   Persistent posterior parafalcine subdural hematoma identified measuring   approximately 4 mm in thickness, without interval change as compared to   prior exam from 6 hours ago. No new mass effect, intraventricular   extension, or new sites of intracranial hemorrhage identified.    Previously suspected subarachnoid hemorrhage at the right frontal high   convexity is favored to represent streak artifact.    Multifocal chronic infarcts present, superimposed upon stable background   white matter hypoattenuation.    CT A/P:   Bilateral pulmonary groundglass opacities likely reflects atypical   pneumonia.  Thickening versus underdistention of the distal sigmoid colon. Correlate   for symptoms of colitis.     (13 Aug 2023 04:03)    Interval Hx: Patient was examined at bedside and stated "I am good" but was unable to respond to follow up questions. On telemetry, patient was in Afib with HR in the 120s.  Cardiologist: Dr. Mirta Goldsmith    PAST MEDICAL & SURGICAL HISTORY:  TBI (traumatic brain injury)      HTN (hypertension)      Atrial fibrillation      Peripheral neuropathy      Major depression      HLD (hyperlipidemia)      CAD (coronary artery disease)      BPH (benign prostatic hyperplasia)      Pneumonia due to COVID-19 virus  2022      Hemorrhage in the brain      H/O craniotomy                ECHO FINDINGS:   < from: TTE Echo Complete w/ Contrast w/ Doppler (23 @ 10:03) >    ACC: [id number] EXAM:  ECHO TTE WITH CON COMP W DOPP                          PROCEDURE DATE:  2023          INTERPRETATION:  TRANSTHORACIC ECHOCARDIOGRAM REPORT        Patient Name:   MELBA PARMAR Patient Location: Inpatient  Medical Rec #:[id number]       Accession #:      [id number]  Account #:                     Height:           68.1 in 173.0 cm  YOB: 1950      Weight:           123.5 lb 56.00 kg  Patient Age:    73 years       BSA:              1.67 m²  Patient Gender: M              BP:               134/98 mmHg      Date of Exam:        2023 10:03:12 AM  Sonographer:         Simin Herron  Referring Physician: Matias Edmondson    Procedure:     2D Echo/Doppler/Color Doppler Complete.  Indications:   I48.91 - Unspecified atrial fibrillation  Diagnosis:     I48.91 - Unspecified atrial fibrillation  Study Details: Technically difficult study. Study quality was adversely   affected                 due to body habitus and the patient being uncooperative.        2D AND M-MODE MEASUREMENTS (normal ranges within parentheses):  Left                 Normal   Aorta/Left            Normal  Ventricle:                    Atrium:  IVSd (2D):    1.21  (0.7-1.1) Aortic Root    3.40  (2.4-3.7)                 cm     (2D):           cm  LVPWd (2D):   1.23  (0.7-1.1) Left Atrium    3.49  (1.9-4.0)                 cm             (2D):           cm  LVIDd (2D):   3.59  (3.4-5.7) LA Volume      25.7                 cm             Index         ml/m²  LVIDs (2D):  2.45                 cm  LV FS (2D):   31.8   (>25%)                  %  LV EF (2D):   61 %   (>55%)  Relative Wall 0.69   (<0.42)  Thickness    LV SYSTOLIC FUNCTION BY 2D PLANIMETRY (MOD):  EF-A4C View: 63.7 % EF-A2C View: 63.2 % EF-Biplane: 65 %    SPECTRAL DOPPLER ANALYSIS (where applicable):  Aortic Valve: AoV Max Bhaskar: 0.94 m/s AoV Peak PG: 3.6 mmHg AoV Mean P.8 mmHg    LVOT Vmax: 0.67 m/s LVOT VTI: 0.113 m LVOT Diameter: 2.37 cm    AoV Area, Vmax: 3.12 cm² AoV Area, VTI: 4.31 cm² AoVArea, Vmn: 3.19 cm²  Ao VTI: 0.116    PHYSICIAN INTERPRETATION:  Left Ventricle: The left ventricular internal cavity size is normal. Left   ventricular wall thickness is normal.  Global LV systolic function was normal. Left ventricular ejection   fraction, by visual estimation, is 60 to 65%. The mitral in-flow pattern   reveals no discernable A-wave, therefore no comment on diastolic function   can be made.  Right Ventricle: Normal right ventricular size and function.  Left Atrium: Normal left atrial size.  Right Atrium: Normal right atrial size.  Pericardium: There is no evidence of pericardial effusion.  Mitral Valve: There is mild mitral annular calcification. Trace mitral   valve regurgitation is seen.  Tricuspid Valve: The tricuspid valve is normal in structure. Adequate TR   velocity was not obtained to accurately assess RVSP.  Aortic Valve: The aortic valve is trileaflet. Sclerotic aortic valve with   normal opening. No evidence of aortic valve regurgitation is seen.  Pulmonic Valve: The pulmonic valve was not well visualized.  Aorta: The aortic root is normal in size and structure.  Pulmonary Artery: The pulmonary artery is not well seen.  Venous: The inferior vena cava is not well visualized.      Summary:   1. Left ventricular ejection fraction, by visual estimation, is 60 to   65%.   2. Normal global left ventricular systolic function.   3. The mitral in-flow pattern reveals no discernable A-wave, therefore   no comment on diastolic function can be made.   4. Normal left atrial size.   5. Normal right atrial size.   6. Mild mitral annular calcification.   7. Trace mitral valve regurgitation.   8. Sclerotic aortic valve with normal opening.    MD Scot Electronically signed on 2023 at 11:46:08 AM            *** Final ***    < end of copied text >        MEDICATIONS  (STANDING):  atorvastatin 40 milliGRAM(s) Oral at bedtime  chlorhexidine 2% Cloths 1 Application(s) Topical every 24 hours  dextrose 5%. 1000 milliLiter(s) (100 mL/Hr) IV Continuous <Continuous>  dextrose 5%. 1000 milliLiter(s) (50 mL/Hr) IV Continuous <Continuous>  dextrose 50% Injectable 25 Gram(s) IV Push once  dextrose 50% Injectable 25 Gram(s) IV Push once  dextrose 50% Injectable 12.5 Gram(s) IV Push once  glucagon  Injectable 1 milliGRAM(s) IntraMuscular once  heparin   Injectable 5000 Unit(s) SubCutaneous every 8 hours  hydrocortisone sodium succinate Injectable 50 milliGRAM(s) IV Push every 8 hours  insulin lispro (ADMELOG) corrective regimen sliding scale   SubCutaneous every 6 hours  mirtazapine 7.5 milliGRAM(s) Oral daily  mupirocin 2% Nasal 1 Application(s) Both Nostrils every 12 hours  pantoprazole  Injectable 40 milliGRAM(s) IV Push daily  piperacillin/tazobactam IVPB.. 3.375 Gram(s) IV Intermittent every 8 hours  sodium chloride 3%  Inhalation 4 milliLiter(s) Inhalation every 12 hours  vancomycin  IVPB 750 milliGRAM(s) IV Intermittent once    MEDICATIONS  (PRN):  dextrose Oral Gel 15 Gram(s) Oral once PRN Blood Glucose LESS THAN 70 milliGRAM(s)/deciliter  sodium chloride 0.9% lock flush 10 milliLiter(s) IV Push every 1 hour PRN Pre/post blood products, medications, blood draw, and to maintain line patency      FAMILY HISTORY:  FH: HTN (hypertension) (Father, Mother, Sibling)    Family history of bone cancer (Sibling)    FH: Alzheimers disease (Sibling)      Denies Family history of CAD or early MI    ROS: Unable to obtain secondary to mental status      SOCIAL HISTORY:    No tobacco, Alcohol or Drug use    Vital Signs Last 24 Hrs  T(C): 36.6 (14 Aug 2023 08:16), Max: 37.8 (13 Aug 2023 19:58)  T(F): 97.9 (14 Aug 2023 08:16), Max: 100 (13 Aug 2023 19:58)  HR: 105 (14 Aug 2023 10:00) (85 - 112)  BP: 98/67 (14 Aug 2023 10:00) (72/51 - 136/79)  BP(mean): 78 (14 Aug 2023 10:00) (57 - 103)  RR: 14 (14 Aug 2023 10:00) (12 - 23)  SpO2: 98% (14 Aug 2023 10:00) (89% - 100%)    Parameters below as of 14 Aug 2023 07:57  Patient On (Oxygen Delivery Method): nasal cannula, 2 LPM        Physical Exam:  General: NAD, awake and alert  HEENT: NCAT, moist mucous membranes   Neurology: Minimally verbal, unable to follow commands   Respiratory: CTA B/L, No W/R/R  CV: Irregular rate and rhythm, no murmurs, rubs or gallops  Abdominal: Soft, NT, ND, no palpable masses  Extremities: 1+ LE pedal edema b/l, no calf tenderness  MSK: Contracted UE b/l, no joint erythema or warmth, no joint swelling   Heme: No obvious ecchymosis or petechiae   Skin: warm, dry, normal color      ECG:  < from: 12 Lead ECG (23 @ 20:39) >  Ventricular Rate 98 BPM    QRS Duration 76 ms    Q-T Interval 342 ms    QTC Calculation(Bazett) 436 ms    R Axis 213 degrees    T Axis 19 degrees    Diagnosis Line Atrial fibrillation  Right ventricular hypertrophy  Inferior infarct (cited on or before 22-MAY-2022)  Anterolateral infarct (cited on or before 22-MAY-2022)  Abnormal ECG  When compared with ECG of 10-IVAN-2023 20:29,  Questionable change in initial forces of Septal leads  Confirmed by Lexi Andujar ([id number]) on 2023 7:25:42 AM     < end of copied text >      I&O's Detail    13 Aug 2023 07:01  -  14 Aug 2023 07:00  --------------------------------------------------------  IN:    IV PiggyBack: 250 mL    IV PiggyBack: 200 mL    IV PiggyBack: 375 mL    Lactated Ringers Bolus: 1000 mL    Norepinephrine: 338.1 mL    Sodium Bicarbonate: 900 mL  Total IN: 3063.1 mL    OUT:    Indwelling Catheter - Urethral (mL): 2005 mL  Total OUT: 2005 mL    Total NET: 1058.1 mL      14 Aug 2023 07:01  -  14 Aug 2023 11:12  --------------------------------------------------------  IN:    IV PiggyBack: 25 mL  Total IN: 25 mL    OUT:    Indwelling Catheter - Urethral (mL): 235 mL  Total OUT: 235 mL    Total NET: -210 mL          LABS:                        8.5    9.95  )-----------( 176      ( 14 Aug 2023 08:40 )             26.1         138  |  104  |  42<H>  ----------------------------<  171<H>  4.0   |  23  |  2.00<H>    Ca    8.2<L>      14 Aug 2023 05:22  Phos  5.0       Mg     1.7         TPro  5.4<L>  /  Alb  3.1<L>  /  TBili  0.6  /  DBili  x   /  AST  9<L>  /  ALT  14  /  AlkPhos  51  08-14    CARDIAC MARKERS ( 12 Aug 2023 21:50 )  x     / x     / x     / x     / 1.0 ng/mL      PT/INR - ( 13 Aug 2023 04:26 )   PT: 21.5 sec;   INR: 1.87 ratio         PTT - ( 13 Aug 2023 04:26 )  PTT:34.8 sec  Urinalysis Basic - ( 14 Aug 2023 05:22 )    Color: x / Appearance: x / SG: x / pH: x  Gluc: 171 mg/dL / Ketone: x  / Bili: x / Urobili: x   Blood: x / Protein: x / Nitrite: x   Leuk Esterase: x / RBC: x / WBC x   Sq Epi: x / Non Sq Epi: x / Bacteria: x      I&O's Summary    13 Aug 2023 07:01  -  14 Aug 2023 07:00  --------------------------------------------------------  IN: 3063.1 mL / OUT: 2005 mL / NET: 1058.1 mL    14 Aug 2023 07:01  -  14 Aug 2023 11:12  --------------------------------------------------------  IN: 25 mL / OUT: 235 mL / NET: -210 mL      BNP  RADIOLOGY & ADDITIONAL STUDIES: Patient is a 73y old  Male who presents with a chief complaint of Pneumonia due to infectious organism     (14 Aug 2023 08:38)      HPI:  74 y/o M with PMHx DM2, HTN, HLD A-fib (on Eliquis), TBI (s/p SDH with EVAC on 2021), and traumatic subdural hemorrhage in 10/2022, BIBEMS with hypoxia, nausea and vomiting. Patient was constipated for almost 2 weeks before wife gave him Miralax. He subsequently developed diarrhea for several days. This afternoon patient vomited and was more lethargic than normal. The home health aide was caring for the patient until the wife returned around 5:30pm and found him lying down and difficult to arouse. The wife called EMS. Currently, patient reports SOB, diffuse pain, and diarrhea. He is mostly non-verbal but is able to respond to yes/no questions when coached or with eye-blinking ques. The wife denies any sick contacts or medication changes. No BMs since admission.    Vitals: T 98.7F, HR 78 --> 131 (Afib), /63 --> 76/52, RR 16, SpO2 98% NRB  Labs: WBC 45, Lactate 14, CO2 11, BUN 31, Cr 1.5, Glucose 66, GFR 49, pro-BNP 1654  VBG: pH 7.2, pCO2 23, pO2 129, HCO3 9  EKG: Afib @ 98bpm, RVH    CT Head:   Persistent posterior parafalcine subdural hematoma identified measuring   approximately 4 mm in thickness, without interval change as compared to   prior exam from 6 hours ago. No new mass effect, intraventricular   extension, or new sites of intracranial hemorrhage identified.    Previously suspected subarachnoid hemorrhage at the right frontal high   convexity is favored to represent streak artifact.    Multifocal chronic infarcts present, superimposed upon stable background   white matter hypoattenuation.    CT A/P:   Bilateral pulmonary groundglass opacities likely reflects atypical   pneumonia.  Thickening versus underdistention of the distal sigmoid colon. Correlate   for symptoms of colitis.     (13 Aug 2023 04:03)    Interval Hx: Patient was examined at bedside and stated "I am good" but was unable to respond to follow up questions. On telemetry, patient was in Afib with HR in the 120s.  Cardiologist: Dr. Mirta Goldsmith    PAST MEDICAL & SURGICAL HISTORY:  TBI (traumatic brain injury)      HTN (hypertension)      Atrial fibrillation      Peripheral neuropathy      Major depression      HLD (hyperlipidemia)      CAD (coronary artery disease)      BPH (benign prostatic hyperplasia)      Pneumonia due to COVID-19 virus  2022      Hemorrhage in the brain      H/O craniotomy                ECHO FINDINGS:   < from: TTE Echo Complete w/ Contrast w/ Doppler (23 @ 10:03) >    ACC: [id number] EXAM:  ECHO TTE WITH CON COMP W DOPP                          PROCEDURE DATE:  2023          INTERPRETATION:  TRANSTHORACIC ECHOCARDIOGRAM REPORT        Patient Name:   MELBA PARMAR Patient Location: Inpatient  Medical Rec #:[id number]       Accession #:      [id number]  Account #:                     Height:           68.1 in 173.0 cm  YOB: 1950      Weight:           123.5 lb 56.00 kg  Patient Age:    73 years       BSA:              1.67 m²  Patient Gender: M              BP:               134/98 mmHg      Date of Exam:        2023 10:03:12 AM  Sonographer:         Simin Herron  Referring Physician: Matias Edmondson    Procedure:     2D Echo/Doppler/Color Doppler Complete.  Indications:   I48.91 - Unspecified atrial fibrillation  Diagnosis:     I48.91 - Unspecified atrial fibrillation  Study Details: Technically difficult study. Study quality was adversely   affected                 due to body habitus and the patient being uncooperative.        2D AND M-MODE MEASUREMENTS (normal ranges within parentheses):  Left                 Normal   Aorta/Left            Normal  Ventricle:                    Atrium:  IVSd (2D):    1.21  (0.7-1.1) Aortic Root    3.40  (2.4-3.7)                 cm     (2D):           cm  LVPWd (2D):   1.23  (0.7-1.1) Left Atrium    3.49  (1.9-4.0)                 cm             (2D):           cm  LVIDd (2D):   3.59  (3.4-5.7) LA Volume      25.7                 cm             Index         ml/m²  LVIDs (2D):  2.45                 cm  LV FS (2D):   31.8   (>25%)                  %  LV EF (2D):   61 %   (>55%)  Relative Wall 0.69   (<0.42)  Thickness    LV SYSTOLIC FUNCTION BY 2D PLANIMETRY (MOD):  EF-A4C View: 63.7 % EF-A2C View: 63.2 % EF-Biplane: 65 %    SPECTRAL DOPPLER ANALYSIS (where applicable):  Aortic Valve: AoV Max Bhaskar: 0.94 m/s AoV Peak PG: 3.6 mmHg AoV Mean P.8 mmHg    LVOT Vmax: 0.67 m/s LVOT VTI: 0.113 m LVOT Diameter: 2.37 cm    AoV Area, Vmax: 3.12 cm² AoV Area, VTI: 4.31 cm² AoVArea, Vmn: 3.19 cm²  Ao VTI: 0.116    PHYSICIAN INTERPRETATION:  Left Ventricle: The left ventricular internal cavity size is normal. Left   ventricular wall thickness is normal.  Global LV systolic function was normal. Left ventricular ejection   fraction, by visual estimation, is 60 to 65%. The mitral in-flow pattern   reveals no discernable A-wave, therefore no comment on diastolic function   can be made.  Right Ventricle: Normal right ventricular size and function.  Left Atrium: Normal left atrial size.  Right Atrium: Normal right atrial size.  Pericardium: There is no evidence of pericardial effusion.  Mitral Valve: There is mild mitral annular calcification. Trace mitral   valve regurgitation is seen.  Tricuspid Valve: The tricuspid valve is normal in structure. Adequate TR   velocity was not obtained to accurately assess RVSP.  Aortic Valve: The aortic valve is trileaflet. Sclerotic aortic valve with   normal opening. No evidence of aortic valve regurgitation is seen.  Pulmonic Valve: The pulmonic valve was not well visualized.  Aorta: The aortic root is normal in size and structure.  Pulmonary Artery: The pulmonary artery is not well seen.  Venous: The inferior vena cava is not well visualized.      Summary:   1. Left ventricular ejection fraction, by visual estimation, is 60 to   65%.   2. Normal global left ventricular systolic function.   3. The mitral in-flow pattern reveals no discernable A-wave, therefore   no comment on diastolic function can be made.   4. Normal left atrial size.   5. Normal right atrial size.   6. Mild mitral annular calcification.   7. Trace mitral valve regurgitation.   8. Sclerotic aortic valve with normal opening.    MD Scot Electronically signed on 2023 at 11:46:08 AM            *** Final ***    < end of copied text >        MEDICATIONS  (STANDING):  atorvastatin 40 milliGRAM(s) Oral at bedtime  chlorhexidine 2% Cloths 1 Application(s) Topical every 24 hours  dextrose 5%. 1000 milliLiter(s) (100 mL/Hr) IV Continuous <Continuous>  dextrose 5%. 1000 milliLiter(s) (50 mL/Hr) IV Continuous <Continuous>  dextrose 50% Injectable 25 Gram(s) IV Push once  dextrose 50% Injectable 25 Gram(s) IV Push once  dextrose 50% Injectable 12.5 Gram(s) IV Push once  glucagon  Injectable 1 milliGRAM(s) IntraMuscular once  heparin   Injectable 5000 Unit(s) SubCutaneous every 8 hours  hydrocortisone sodium succinate Injectable 50 milliGRAM(s) IV Push every 8 hours  insulin lispro (ADMELOG) corrective regimen sliding scale   SubCutaneous every 6 hours  mirtazapine 7.5 milliGRAM(s) Oral daily  mupirocin 2% Nasal 1 Application(s) Both Nostrils every 12 hours  pantoprazole  Injectable 40 milliGRAM(s) IV Push daily  piperacillin/tazobactam IVPB.. 3.375 Gram(s) IV Intermittent every 8 hours  sodium chloride 3%  Inhalation 4 milliLiter(s) Inhalation every 12 hours  vancomycin  IVPB 750 milliGRAM(s) IV Intermittent once    MEDICATIONS  (PRN):  dextrose Oral Gel 15 Gram(s) Oral once PRN Blood Glucose LESS THAN 70 milliGRAM(s)/deciliter  sodium chloride 0.9% lock flush 10 milliLiter(s) IV Push every 1 hour PRN Pre/post blood products, medications, blood draw, and to maintain line patency      FAMILY HISTORY:  FH: HTN (hypertension) (Father, Mother, Sibling)    Family history of bone cancer (Sibling)    FH: Alzheimers disease (Sibling)      Denies Family history of CAD or early MI    ROS: Unable to obtain secondary to mental status      SOCIAL HISTORY:    No tobacco, Alcohol or Drug use    Vital Signs Last 24 Hrs  T(C): 36.6 (14 Aug 2023 08:16), Max: 37.8 (13 Aug 2023 19:58)  T(F): 97.9 (14 Aug 2023 08:16), Max: 100 (13 Aug 2023 19:58)  HR: 105 (14 Aug 2023 10:00) (85 - 112)  BP: 98/67 (14 Aug 2023 10:00) (72/51 - 136/79)  BP(mean): 78 (14 Aug 2023 10:00) (57 - 103)  RR: 14 (14 Aug 2023 10:00) (12 - 23)  SpO2: 98% (14 Aug 2023 10:00) (89% - 100%)    Parameters below as of 14 Aug 2023 07:57  Patient On (Oxygen Delivery Method): nasal cannula, 2 LPM        Physical Exam:  General: NAD, awake and alert  HEENT: NCAT, moist mucous membranes   Neurology: Minimally verbal, unable to follow commands   Respiratory: CTA B/L, No W/R/R  CV: Irregular rate and rhythm, no murmurs, rubs or gallops  Abdominal: Soft, NT, ND, no palpable masses  Extremities: trace LE pedal edema b/l, no calf tenderness  MSK: Contracted UE b/l, no joint erythema or warmth, no joint swelling   Heme: No obvious ecchymosis or petechiae   Skin: warm, dry, normal color      ECG:  < from: 12 Lead ECG (23 @ 20:39) >  Ventricular Rate 98 BPM    QRS Duration 76 ms    Q-T Interval 342 ms    QTC Calculation(Bazett) 436 ms    R Axis 213 degrees    T Axis 19 degrees    Diagnosis Line Atrial fibrillation  Right ventricular hypertrophy  Inferior infarct (cited on or before 22-MAY-2022)  Anterolateral infarct (cited on or before 22-MAY-2022)  Abnormal ECG  When compared with ECG of 10-IVAN-2023 20:29,  Questionable change in initial forces of Septal leads  Confirmed by Lexi Andujar ([id number]) on 2023 7:25:42 AM     < end of copied text >      I&O's Detail    13 Aug 2023 07:01  -  14 Aug 2023 07:00  --------------------------------------------------------  IN:    IV PiggyBack: 250 mL    IV PiggyBack: 200 mL    IV PiggyBack: 375 mL    Lactated Ringers Bolus: 1000 mL    Norepinephrine: 338.1 mL    Sodium Bicarbonate: 900 mL  Total IN: 3063.1 mL    OUT:    Indwelling Catheter - Urethral (mL): 2005 mL  Total OUT: 2005 mL    Total NET: 1058.1 mL      14 Aug 2023 07:01  -  14 Aug 2023 11:12  --------------------------------------------------------  IN:    IV PiggyBack: 25 mL  Total IN: 25 mL    OUT:    Indwelling Catheter - Urethral (mL): 235 mL  Total OUT: 235 mL    Total NET: -210 mL          LABS:                        8.5    9.95  )-----------( 176      ( 14 Aug 2023 08:40 )             26.1         138  |  104  |  42<H>  ----------------------------<  171<H>  4.0   |  23  |  2.00<H>    Ca    8.2<L>      14 Aug 2023 05:22  Phos  5.0       Mg     1.7         TPro  5.4<L>  /  Alb  3.1<L>  /  TBili  0.6  /  DBili  x   /  AST  9<L>  /  ALT  14  /  AlkPhos  51  0814    CARDIAC MARKERS ( 12 Aug 2023 21:50 )  x     / x     / x     / x     / 1.0 ng/mL      PT/INR - ( 13 Aug 2023 04:26 )   PT: 21.5 sec;   INR: 1.87 ratio         PTT - ( 13 Aug 2023 04:26 )  PTT:34.8 sec  Urinalysis Basic - ( 14 Aug 2023 05:22 )    Color: x / Appearance: x / SG: x / pH: x  Gluc: 171 mg/dL / Ketone: x  / Bili: x / Urobili: x   Blood: x / Protein: x / Nitrite: x   Leuk Esterase: x / RBC: x / WBC x   Sq Epi: x / Non Sq Epi: x / Bacteria: x      I&O's Summary    13 Aug 2023 07:01  -  14 Aug 2023 07:00  --------------------------------------------------------  IN: 3063.1 mL / OUT: 2005 mL / NET: 1058.1 mL    14 Aug 2023 07:01  -  14 Aug 2023 11:12  --------------------------------------------------------  IN: 25 mL / OUT: 235 mL / NET: -210 mL      BNP  RADIOLOGY & ADDITIONAL STUDIES:'    EKG: afib 90's, RAD, inferior and septal infarct (unchanged from prior)

## 2023-08-14 NOTE — DIETITIAN INITIAL EVALUATION ADULT - PROBLEM SELECTOR PLAN 1
P/w N/V/D, AMS, AHRF likely 2/2 multifocal PNA, w/ possible colitis  - Tachycardic, hypoxic, elevated WBC (45), elevated lactate (14). Meets sepsis criteria  - CT Chest: Bilateral pulmonary groundglass opacities likely reflects atypical pneumonia.  - S/p Vanc, Zosyn, Cefepime, Azithromycin in ED. Recommend cont Zosyn   - Trend lactate, and WBC. s/p 3L IVF in ED  - CT A/P: colitis  - VBG: pH 7.2, pCO2 23, pO2 129, HCO3 9  - Cont supp O2 to maintain O2 sat >92%  - F/u blood and urine cx  - Recommend ID and Pulm consults  - Further management per ICU

## 2023-08-14 NOTE — DIETITIAN INITIAL EVALUATION ADULT - ORAL INTAKE PTA/DIET HISTORY
pt unable to provide, pts wife not in attendance at this time. Noted, pt at baseline is a poor historian

## 2023-08-14 NOTE — DIETITIAN INITIAL EVALUATION ADULT - PERTINENT LABORATORY DATA
08-14    138  |  104  |  42<H>  ----------------------------<  171<H>  4.0   |  23  |  2.00<H>    Ca    8.2<L>      14 Aug 2023 05:22  Phos  5.0     08-14  Mg     1.7     08-14    TPro  5.4<L>  /  Alb  3.1<L>  /  TBili  0.6  /  DBili  x   /  AST  9<L>  /  ALT  14  /  AlkPhos  51  08-14  POCT Blood Glucose.: 176 mg/dL (08-14-23 @ 05:04)  A1C with Estimated Average Glucose Result: 5.5 % (08-12-23 @ 21:50)  A1C with Estimated Average Glucose Result: 5.5 % (06-12-23 @ 05:51)  A1C with Estimated Average Glucose Result: 9.3 % (11-23-22 @ 05:29)

## 2023-08-14 NOTE — DIETITIAN INITIAL EVALUATION ADULT - ENERGY INTAKE
72 y/o male with history of DM, HTN, HLD, Afib on Eliquis, TBI s/p R SDH evac in 9/2021, traumatic SDH 10/22 in ICU for septic shock 2/2 pnemonia, bin, and metabolic acidosis    Events last 24 hours:   -Remains in vasopressor-dependent shock state w/ Levophed infusion.  -Lactate downtrending. BS stable. Renal indices unchanged.   -Afebrile.     PAST MEDICAL & SURGICAL HISTORY:  TBI (traumatic brain injury)  HTN (hypertension)  Atrial fibrillation  Peripheral neuropathy  Major depression  HLD (hyperlipidemia)  CAD (coronary artery disease)  BPH (benign prostatic hyperplasia)  Pneumonia due to COVID-19 virus  June 2022  Hemorrhage in the brain  H/O craniotomy     NPO

## 2023-08-14 NOTE — PROGRESS NOTE ADULT - PROBLEM SELECTOR PLAN 1
- CT Chest: Bilateral pulmonary ground glass opacities likely reflects atypical pneumonia.  - S/p Vanc, Zosyn, Cefepime, Azithromycin in ED.   - Continue IV Zosyn  - Dc IV azithro and started vanc as PT was found to be MRSA +  - continue bactroban   - CT A/P - concerning for colitis  - Levophed gtt for septic shock - now off levophed  - Cont supp O2 to maintain O2 sat >92%  - Blood Cx (prelim - NGTD), Urine Cx (final - no growth)  - Further management per ICU - CT Chest: Bilateral pulmonary ground glass opacities likely reflects atypical pneumonia.  - S/p Vanc, Zosyn, Cefepime, Azithromycin in ED.   - Continue IV Zosyn  - Dc IV azithro and started vanc as PT was found to be MRSA +  - continue bactroban  5 days bid   - CT A/P - concerning for colitis  - Levophed gtt for septic shock - now off levophed  - Cont supp O2 to maintain O2 sat >92%  - Blood Cx (prelim - NGTD), Urine Cx (final - no growth)  - Further management per ICU

## 2023-08-14 NOTE — PROGRESS NOTE ADULT - SUBJECTIVE AND OBJECTIVE BOX
Patient is a 73y old  Male who presents with a chief complaint of Sepsis (13 Aug 2023 13:42)    BRIEF HOSPITAL COURSE:   72 y/o male with history of DM, HTN, HLD, Afib on Eliquis, TBI s/p R SDH evac in 9/2021, traumatic SDH 10/22 in ICU for septic shock 2/2 pnemonia, latoya, and metabolic acidosis    Events last 24 hours:   -Remains in vasopressor-dependent shock state w/ Levophed infusion.  -Lactate downtrending. BS stable. Renal indices unchanged.   -Afebrile.     PAST MEDICAL & SURGICAL HISTORY:  TBI (traumatic brain injury)  HTN (hypertension)  Atrial fibrillation  Peripheral neuropathy  Major depression  HLD (hyperlipidemia)  CAD (coronary artery disease)  BPH (benign prostatic hyperplasia)  Pneumonia due to COVID-19 virus  June 2022  Hemorrhage in the brain  H/O craniotomy    Review of Systems:  CONSTITUTIONAL: No fever, chills, or fatigue  EYES: No eye pain, visual disturbances, or discharge  ENMT:  No difficulty hearing, tinnitus, vertigo; No sinus or throat pain  NECK: No pain or stiffness  RESPIRATORY: No cough, wheezing, chills or hemoptysis; No shortness of breath  CARDIOVASCULAR: No chest pain, palpitations, dizziness, or leg swelling  GASTROINTESTINAL: No abdominal or epigastric pain. No nausea, vomiting, or hematemesis; No diarrhea or constipation. No melena or hematochezia.  GENITOURINARY: No dysuria, frequency, hematuria, or incontinence  NEUROLOGICAL: No headaches, memory loss, loss of strength, numbness, or tremors  SKIN: No itching, burning, rashes, or lesions   MUSCULOSKELETAL: No joint pain or swelling; No muscle, back, or extremity pain  PSYCHIATRIC: No depression, anxiety, mood swings, or difficulty sleeping    Medications:  azithromycin  IVPB 500 milliGRAM(s) IV Intermittent every 24 hours  piperacillin/tazobactam IVPB.. 3.375 Gram(s) IV Intermittent every 8 hours  norepinephrine Infusion 0.05 MICROgram(s)/kG/Min IV Continuous <Continuous>  sodium chloride 3%  Inhalation 4 milliLiter(s) Inhalation every 12 hours  mirtazapine 7.5 milliGRAM(s) Oral daily  heparin   Injectable 5000 Unit(s) SubCutaneous every 8 hours  pantoprazole  Injectable 40 milliGRAM(s) IV Push daily  atorvastatin 40 milliGRAM(s) Oral at bedtime  dextrose 50% Injectable 25 Gram(s) IV Push once  dextrose 50% Injectable 25 Gram(s) IV Push once  dextrose 50% Injectable 12.5 Gram(s) IV Push once  dextrose Oral Gel 15 Gram(s) Oral once PRN  glucagon  Injectable 1 milliGRAM(s) IntraMuscular once  hydrocortisone sodium succinate Injectable 50 milliGRAM(s) IV Push every 6 hours  insulin lispro (ADMELOG) corrective regimen sliding scale   SubCutaneous three times a day before meals  albumin human 25% IVPB 100 milliLiter(s) IV Intermittent every 4 hours  dextrose 5%. 1000 milliLiter(s) IV Continuous <Continuous>  dextrose 5%. 1000 milliLiter(s) IV Continuous <Continuous>  sodium chloride 0.9% lock flush 10 milliLiter(s) IV Push every 1 hour PRN  mupirocin 2% Nasal 1 Application(s) Both Nostrils every 12 hours    ICU Vital Signs Last 24 Hrs  T(C): 37.8 (13 Aug 2023 19:58), Max: 37.8 (13 Aug 2023 19:58)  T(F): 100 (13 Aug 2023 19:58), Max: 100 (13 Aug 2023 19:58)  HR: 90 (13 Aug 2023 22:30) (87 - 131)  BP: 113/71 (13 Aug 2023 22:30) (72/51 - 124/77)  BP(mean): 88 (13 Aug 2023 22:30) (55 - 94)  ABP: --  ABP(mean): --  RR: 16 (13 Aug 2023 22:30) (12 - 32)  SpO2: 99% (13 Aug 2023 22:30) (83% - 100%)  O2 Parameters below as of 13 Aug 2023 13:15  Patient On (Oxygen Delivery Method): nasal cannula  O2 Flow (L/min): 6    I&O's Detail  12 Aug 2023 07:01  -  13 Aug 2023 07:00  --------------------------------------------------------  IN:    IV PiggyBack: 100 mL    IV PiggyBack: 250 mL    IV PiggyBack: 100 mL    Lactated Ringers: 125 mL    Lactated Ringers Bolus: 1000 mL    Norepinephrine: 32 mL    Sodium Bicarbonate: 250 mL  Total IN: 1857 mL  OUT:  Total OUT: 0 mL  Total NET: 1857 mL    13 Aug 2023 07:01  -  13 Aug 2023 22:50  --------------------------------------------------------  IN:    IV PiggyBack: 250 mL    IV PiggyBack: 50 mL    Lactated Ringers Bolus: 1000 mL    Norepinephrine: 290 mL    Sodium Bicarbonate: 900 mL  Total IN: 2490 mL  OUT:    Indwelling Catheter - Urethral (mL): 1005 mL  Total OUT: 1005 mL  Total NET: 1485 mL    LABS:                      10.9   50.24 )-----------( 347      ( 13 Aug 2023 04:26 )             36.5     08-13  137 |  103  |  40<H>  ----------------------------<  143<H>  4.8   |  18<L>  |  1.90<H>  Ca    8.2<L>      13 Aug 2023 14:56  Phos  5.4     08-13  Mg     1.6     08-13  TPro  5.4<L>  /  Alb  2.5<L>  /  TBili  0.4  /  DBili  x   /  AST  13<L>  /  ALT  15  /  AlkPhos  71  08-13    CARDIAC MARKERS ( 12 Aug 2023 21:50 )  x     / x     / x     / x     / 1.0 ng/mL    CAPILLARY BLOOD GLUCOSE  POCT Blood Glucose.: 99 mg/dL (13 Aug 2023 17:03)    PT/INR - ( 13 Aug 2023 04:26 )   PT: 21.5 sec;   INR: 1.87 ratio    PTT - ( 13 Aug 2023 04:26 )  PTT:34.8 sec    Urinalysis Basic - ( 13 Aug 2023 14:56 )  Color: x / Appearance: x / SG: x / pH: x  Gluc: 143 mg/dL / Ketone: x  / Bili: x / Urobili: x   Blood: x / Protein: x / Nitrite: x   Leuk Esterase: x / RBC: x / WBC x   Sq Epi: x / Non Sq Epi: x / Bacteria: x    CULTURES:  Rapid RVP Result: NotDetec (08-13-23 @ 03:50)    Physical Examination:  General: Elderly adult male.   HEENT: PERRL.   NECK: Supple.   PULM: Course BS at bases bilaterally.  CVS: s1/s2.   ABD: Soft, nondistended, nontender, normoactive bowel sounds.  EXT: No edema, nontender.  SKIN: Warm.    RADIOLOGY:   < from: CT Head No Cont (08.13.23 @ 02:02) >  ACC: [id number] EXAM:  CT BRAIN   ORDERED BY: SUSU BERMUDEZ   PROCEDURE DATE:  08/13/2023    IMPRESSION:  Persistent posterior parafalcine subdural hematoma identified measuring   approximately 4 mm in thickness, without interval change as compared to   prior exam from 6 hours ago. No new mass effect, intraventricular   extension, or new sites of intracranial hemorrhage identified.  Previously suspected subarachnoid hemorrhage at the right frontal high   convexity is favored to represent streak artifact.  Multifocal chronic infarcts present, superimposed upon stable background   white matter hypoattenuation.    72 y/o male with history of DM, HTN, HLD, Afib on Eliquis, TBI s/p R SDH evac in 9/2021, traumatic SDH 10/22 in ICU for septic shock 2/2 pnemonia, latoya, and metabolic acidosis  Assessment:  1. Septic Shock  2. PNA  3. LATOYA  4. Metabolic Acidosis  5. Severe Protein-Calorie Malnutrition    Plan:  NEURO:   -Monitor mental status closely, avoid neurosuppresants. Serial neurologic assessments.   -Remeron QD.    CV:   -Remains in vasopressor-dependent shock state w/ Levophed infusion - actively titrating to maintain goal MAP >65 monitoring end points of organ perfusion. Albumin 25% 100cc q4h x2 ordered this evening to offload vasopressor therapy and facilitate weaning off. SDS w/ Solu-Cortef. Consider adding Midodrine to further wean vasopressors if able to tolerate PO.   -Keep K~4 and Mg>2 for optimal arrhythmia suppression.  -Lipitor QD.   -Official TTE pending.     PULM:   -Satting well on NC, actively titrating FiO2 to maintain goal SpO2 >88%.   -Incentive spirometry, Chest PT/Pulmonary toilet, HOB >30'. NaCl 3% inhalation q12h added.      GI:   -NPO.   -Protonix QD.  -S/s evaluation.     RENAL:   -Renal function currently w/ LATOYA likely ATN 2/2 septic shock.  -trend lytes/Scr daily with BMP  -I's and O's, goal UOP 0.5 cc/kg/hr  -renal dose meds and avoid nephrotoxins   -Alkali therapy w/ HCO3 infusion stopped.     ENDO:   -Aggressive glycemic control to limit FS glucose to <180mg/dl. ISS.   -Keep Euthyroid.     ID:   -Afebrile w/ leukocytosis. Lactate downtrending. CT Chest w/ evidence of b/l PNA. UA negative. BloodCx, UCx, Legionella sent - pending. Empiric Zosyn / Zithromax courses.   -ABX use and/or discontinuation based on discussion with ID in conjunction with clinical features, culture data, and judicious procalcitonin monitoring.    HEME:   -Pharmacologic DVT Prophylaxis in addition to SCD's w/ SQH.     GOC: DNR w/ trial of intubation.     CRITICAL CARE TIME SPENT:  38 minutes of critical care time spent providing medical care for patient's acute illness/conditions that impairs at least one vital organ system and/or poses a high risk of imminent or life threatening deterioration in the patient's condition. It includes time spent evaluating and treating the patient's acute illness as well as time spent reviewing labs, radiology, discussing goals of care with patient and/or patient's family, and discussing the case with a multidisciplinary team, in an effort to prevent further life threatening deterioration or end organ damage. This time is independent of any procedures performed.

## 2023-08-14 NOTE — DIETITIAN INITIAL EVALUATION ADULT - OTHER INFO
74 y/o male with history of DM, HTN, HLD, peripheral neuropathy , Pneumonia due to COVID-19 virus June 2022, MDD, Afib on Eliquis, TBI s/p R SDH evac in 9/2021, traumatic SDH 10/22 in ICU for septic shock 2/2 pneumonia, bin, and metabolic acidosis Events last 24 hours:  Remains in vasopressor-dependent shock state w/ Levophed infusion. -Lactate downtrending. BS stable. Renal indices unchanged.    At time of RD visit, pt confused, answering questions inappropriately or giving blank stare. Pt is slender appearing  74 y/o male with history of DM, HTN, HLD, peripheral neuropathy , Pneumonia due to COVID-19 virus June 2022, MDD, Afib on Eliquis, TBI s/p R SDH evac in 9/2021, traumatic SDH 10/22 in ICU for septic shock 2/2 pneumonia, latoya, and metabolic acidosis Events last 24 hours:  Remains in vasopressor-dependent shock state w/ Levophed infusion. -Lactate downtrending. BS stable. Renal indices unchanged LATOYA likely 2/2 septic shock. Aggressive glycemic control to limit FS glucose to <180mg/dl.  -Keep Euthyroid.       At time of RD visit, pt confused, answering questions inappropriately or giving blank stare. Pt is slender appearing  74 y/o male with history of DM, HTN, HLD, peripheral neuropathy , Pneumonia due to COVID-19 virus June 2022, MDD, Afib on Eliquis, TBI s/p R SDH evac in 9/2021, traumatic SDH 10/22 in ICU for septic shock 2/2 pneumonia, latoya, and metabolic acidosis Events last 24 hours:  Remains in vasopressor-dependent shock state w/ Levophed infusion. -Lactate downtrending. BS stable. Renal indices unchanged LATOYA likely 2/2 septic shock. Aggressive glycemic control to limit FS glucose to <180mg/dl.  At time of RD visit, pt confused, answering questions inappropriately or giving blank stare. Pt is slender appearing . Nutrition assessment from June 2023 reviewed, pt requiring at that time full assistance with meals, weighed 123.5#

## 2023-08-14 NOTE — DIETITIAN INITIAL EVALUATION ADULT - NSFNSPHYEXAMSKINFT_GEN_A_CORE
Pressure Injury 1: sacrum, Stage I  Pressure Injury 2: bilateral buttocks, Stage II  Pressure Injury 3: none, none  Pressure Injury 4: none, none  Pressure Injury 5: none, none  Pressure Injury 6: none, none  Pressure Injury 7: none, none  Pressure Injury 8: none, none  Pressure Injury 9: none, none  Pressure Injury 10: none, none  Pressure Injury 11: none, none, Pressure Injury 1: sacrum, Stage I  Pressure Injury 2: Bilateral, buttocks, Stage II  Pressure Injury 3: none, none  Pressure Injury 4: none, none  Pressure Injury 5: none, none  Pressure Injury 6: none, none  Pressure Injury 7: none, none  Pressure Injury 8: none, none  Pressure Injury 9: none, none  Pressure Injury 10: none, none  Pressure Injury 11: none, none sacrum, Stage I and  buttocks, Stage II

## 2023-08-14 NOTE — CONSULT NOTE ADULT - ATTENDING COMMENTS
72 y/o M with PMHx DM2, HTN, HLD A-fib (on Eliquis), TBI (s/p SDH with EVAC on 9/2021), and traumatic subdural hemorrhage in 10/2022, BIBEMS with hypoxia, nausea and vomiting. Admitted to ICU for sepsis pneumonia, bin, and metabolic acidosis.   Patient has markedly improved from admission. Now off pressors and hemodynamically stable. Lactate has normalized.   In terms of his afib, his rates are around 100-120's. Home Diltiazem is on hold given recent pressor requirement. Would continue to hold for now as his rates are overall acceptable given his recent shock.   4mm SDH hematoma noted on CTH which had remained stable form prior CTH. Previously, when AC had been held, he developed DVT.   CHADSVASc elevated to 3 (age, HTN, DM). Resume AC only when safe to do so from a neuro/NSGY perspective.     No evidence of ischemia or volume overload at this time.   Will continue to monitor closely. 72 y/o M with PMHx DM2, HTN, HLD A-fib (on Eliquis), TBI (s/p SDH with EVAC on 9/2021), and traumatic subdural hemorrhage in 10/2022, BIBEMS with hypoxia, nausea and vomiting. Admitted to ICU for sepsis pneumonia, bin, and metabolic acidosis.   Patient has markedly improved from admission. Now off pressors and hemodynamically stable. Lactate has normalized.   In terms of his afib, his rates are around 100-120's. Home Diltiazem is on hold given recent pressor requirement. Would continue to hold for now as his rates are overall acceptable given his recent shock.   4mm SDH hematoma noted on CTH which had remained stable form prior CTH. Previously, when AC had been held, he developed DVT.   CHADSVASc elevated to 3 (age, HTN, DM). Resume AC only when safe to do so from a neuro/NSGY perspective and when H+H has stabilized.     No evidence of ischemia or volume overload at this time.   Will continue to monitor closely.

## 2023-08-14 NOTE — PROGRESS NOTE ADULT - ATTENDING COMMENTS
73M h/o DM, HTN, HLD, AFib on Eliquis, TBI from R SDH s/p evacuation (9/2021), traumatic SDH (10/2022) now a/w sepsis 2/2 PNA c/b LATOYA and metabolic acidosis now greatly improved and weaned off pressor support    Neuro: no acute issues; baseline mental status able to relay needs to wife most of the time though has been declining over the last few months   CV: distributive shock state 2/2 sepsis, now weaned of levophed and can wean stress dose steroids    - lactate now normalized  PULM: b/l aspiration PNA stable on room air, goal SpO2 > 90%  GI: NPO pending speech and swallow eval.   RENAL: LATOYA likely in setting of sepsis,     Renal:   - LATOYA likely in setting of ATN/Sepsis.   - Anion gap metabolic acidosis secondary to lactic acidosis and uremia. Downtrending lactate.   - sodium bicarb infusion for now.   - Chow in place.  - Avoid nephrotoxic agent.     ID:   - Pan cultured; MRSA swab, legionella and urine cultures   - Empiric abx coverage w/ Zosyn and azithromycin.   - MRSA and RVP negative at time  - UA negative for active infx. Pending UCx results.      Heme:   - Begin heparin for DVT ppx  - Patient high bleeding risk. Will hold chemical AC currently for risk of overshooting with heparin ptt as patient is a high bleeding risk.   - INR elevated. Will not reverse as not actively bleeding.   - SCDs for DVT prophylaxis.    Endo:   - Start Lispro SS coverage  - Hemoglobin a1c in AM.    Ethics:  -Patient DNR but wife would want him to be intubated for a reversible cause.    Patient is critically ill. Needs ICU level care. 73M h/o DM, HTN, HLD, AFib on Eliquis, TBI from R SDH s/p evacuation (9/2021), traumatic SDH (10/2022) now a/w sepsis 2/2 PNA c/b LATOYA and metabolic acidosis now greatly improved and weaned off pressor support    Neuro: no acute issues; baseline mental status able to relay needs to wife most of the time though has been declining over the last few months   CV: distributive shock state 2/2 sepsis, now weaned of levophed and can wean stress dose steroids    - lactate now normalized  PULM: b/l aspiration PNA stable on room air, goal SpO2 > 90%  GI: NPO pending speech and swallow eval.   RENAL: LATOYA likely in setting of sepsis, continues to uptrend though making adequate UOP  - bicarb dc/ed, acidosis likely 2/2 lacticemia, now resolved    ID: continue empiric zosyn and vanc by level pending culture results  - MRSA swab positive, continue mupiricin to complete 5 day course  - urine legionella negative, can dc azithromycin   ENDO: A1c 5.6 - can dc ISS coverage but continue FS q6hr while NPO  HEME: elevated INR on admission, will repeat in AM for surveillance   - Hb 11 --> 8.5 though no signs of active bleeding, can continue to monitor off home Eliquis  - continue dvt ppx with HSQ  ETHICS: DNR but wife would want him to be intubated for a reversible cause.    Stable for transfer to floor

## 2023-08-14 NOTE — DIETITIAN INITIAL EVALUATION ADULT - PERTINENT MEDS FT
MEDICATIONS  (STANDING):  atorvastatin 40 milliGRAM(s) Oral at bedtime  chlorhexidine 2% Cloths 1 Application(s) Topical every 24 hours  dextrose 5%. 1000 milliLiter(s) (100 mL/Hr) IV Continuous <Continuous>  dextrose 5%. 1000 milliLiter(s) (50 mL/Hr) IV Continuous <Continuous>  dextrose 50% Injectable 12.5 Gram(s) IV Push once  dextrose 50% Injectable 25 Gram(s) IV Push once  dextrose 50% Injectable 25 Gram(s) IV Push once  glucagon  Injectable 1 milliGRAM(s) IntraMuscular once  heparin   Injectable 5000 Unit(s) SubCutaneous every 8 hours  hydrocortisone sodium succinate Injectable 50 milliGRAM(s) IV Push every 6 hours  insulin lispro (ADMELOG) corrective regimen sliding scale   SubCutaneous every 6 hours  mirtazapine 7.5 milliGRAM(s) Oral daily  mupirocin 2% Nasal 1 Application(s) Both Nostrils every 12 hours  norepinephrine Infusion 0.05 MICROgram(s)/kG/Min (5.95 mL/Hr) IV Continuous <Continuous>  pantoprazole  Injectable 40 milliGRAM(s) IV Push daily  piperacillin/tazobactam IVPB.. 3.375 Gram(s) IV Intermittent every 8 hours  sodium chloride 3%  Inhalation 4 milliLiter(s) Inhalation every 12 hours    MEDICATIONS  (PRN):  dextrose Oral Gel 15 Gram(s) Oral once PRN Blood Glucose LESS THAN 70 milliGRAM(s)/deciliter  sodium chloride 0.9% lock flush 10 milliLiter(s) IV Push every 1 hour PRN Pre/post blood products, medications, blood draw, and to maintain line patency

## 2023-08-14 NOTE — PROGRESS NOTE ADULT - PROBLEM SELECTOR PLAN 3
Patient with chronic atrial fibrillation  - Tachycardic on admission, progressed to Afib with RVR likely 2/2 dehydration, hypotension, acute illness  - s/p Cardizem IVP 5mg on admission with improved rates  - Hold home Diltiazem and Eliquis  - f/u TTE

## 2023-08-14 NOTE — DIETITIAN INITIAL EVALUATION ADULT - PROBLEM SELECTOR PLAN 5
Likely 2/2 deconditioning/immobility   - Cont gabapentin  - S/p ofirmev in ED   - Further management per ICU

## 2023-08-15 LAB
ALBUMIN SERPL ELPH-MCNC: 3.1 G/DL — LOW (ref 3.3–5)
ALP SERPL-CCNC: 54 U/L — SIGNIFICANT CHANGE UP (ref 40–120)
ALT FLD-CCNC: 13 U/L — SIGNIFICANT CHANGE UP (ref 12–78)
ANION GAP SERPL CALC-SCNC: 12 MMOL/L — SIGNIFICANT CHANGE UP (ref 5–17)
APTT BLD: 28.6 SEC — SIGNIFICANT CHANGE UP (ref 24.5–35.6)
AST SERPL-CCNC: 13 U/L — LOW (ref 15–37)
BASOPHILS # BLD AUTO: 0.01 K/UL — SIGNIFICANT CHANGE UP (ref 0–0.2)
BASOPHILS NFR BLD AUTO: 0.1 % — SIGNIFICANT CHANGE UP (ref 0–2)
BILIRUB SERPL-MCNC: 0.9 MG/DL — SIGNIFICANT CHANGE UP (ref 0.2–1.2)
BUN SERPL-MCNC: 42 MG/DL — HIGH (ref 7–23)
CALCIUM SERPL-MCNC: 8.2 MG/DL — LOW (ref 8.5–10.1)
CHLORIDE SERPL-SCNC: 107 MMOL/L — SIGNIFICANT CHANGE UP (ref 96–108)
CO2 SERPL-SCNC: 25 MMOL/L — SIGNIFICANT CHANGE UP (ref 22–31)
CREAT SERPL-MCNC: 1.9 MG/DL — HIGH (ref 0.5–1.3)
EGFR: 37 ML/MIN/1.73M2 — LOW
EOSINOPHIL # BLD AUTO: 0.03 K/UL — SIGNIFICANT CHANGE UP (ref 0–0.5)
EOSINOPHIL NFR BLD AUTO: 0.3 % — SIGNIFICANT CHANGE UP (ref 0–6)
GLUCOSE SERPL-MCNC: 163 MG/DL — HIGH (ref 70–99)
HCT VFR BLD CALC: 28.9 % — LOW (ref 39–50)
HGB BLD-MCNC: 9.6 G/DL — LOW (ref 13–17)
IMM GRANULOCYTES NFR BLD AUTO: 0.8 % — SIGNIFICANT CHANGE UP (ref 0–0.9)
INR BLD: 1.27 RATIO — HIGH (ref 0.85–1.18)
LYMPHOCYTES # BLD AUTO: 0.68 K/UL — LOW (ref 1–3.3)
LYMPHOCYTES # BLD AUTO: 6.5 % — LOW (ref 13–44)
MAGNESIUM SERPL-MCNC: 1.9 MG/DL — SIGNIFICANT CHANGE UP (ref 1.6–2.6)
MCHC RBC-ENTMCNC: 24.6 PG — LOW (ref 27–34)
MCHC RBC-ENTMCNC: 33.2 GM/DL — SIGNIFICANT CHANGE UP (ref 32–36)
MCV RBC AUTO: 74.1 FL — LOW (ref 80–100)
MONOCYTES # BLD AUTO: 0.73 K/UL — SIGNIFICANT CHANGE UP (ref 0–0.9)
MONOCYTES NFR BLD AUTO: 7 % — SIGNIFICANT CHANGE UP (ref 2–14)
NEUTROPHILS # BLD AUTO: 8.91 K/UL — HIGH (ref 1.8–7.4)
NEUTROPHILS NFR BLD AUTO: 85.3 % — HIGH (ref 43–77)
NRBC # BLD: 0 /100 WBCS — SIGNIFICANT CHANGE UP (ref 0–0)
PHOSPHATE SERPL-MCNC: 4.2 MG/DL — SIGNIFICANT CHANGE UP (ref 2.5–4.5)
PLATELET # BLD AUTO: 193 K/UL — SIGNIFICANT CHANGE UP (ref 150–400)
POTASSIUM SERPL-MCNC: 2.6 MMOL/L — CRITICAL LOW (ref 3.5–5.3)
POTASSIUM SERPL-MCNC: 3.7 MMOL/L — SIGNIFICANT CHANGE UP (ref 3.5–5.3)
POTASSIUM SERPL-SCNC: 2.6 MMOL/L — CRITICAL LOW (ref 3.5–5.3)
POTASSIUM SERPL-SCNC: 3.7 MMOL/L — SIGNIFICANT CHANGE UP (ref 3.5–5.3)
PROT SERPL-MCNC: 5.6 G/DL — LOW (ref 6–8.3)
PROTHROM AB SERPL-ACNC: 14.7 SEC — HIGH (ref 9.5–13)
RBC # BLD: 3.9 M/UL — LOW (ref 4.2–5.8)
RBC # FLD: 17.2 % — HIGH (ref 10.3–14.5)
SODIUM SERPL-SCNC: 144 MMOL/L — SIGNIFICANT CHANGE UP (ref 135–145)
VANCOMYCIN FLD-MCNC: 18.6 UG/ML — SIGNIFICANT CHANGE UP (ref 10–25)
WBC # BLD: 10.44 K/UL — SIGNIFICANT CHANGE UP (ref 3.8–10.5)
WBC # FLD AUTO: 10.44 K/UL — SIGNIFICANT CHANGE UP (ref 3.8–10.5)

## 2023-08-15 PROCEDURE — 99233 SBSQ HOSP IP/OBS HIGH 50: CPT | Mod: GC

## 2023-08-15 PROCEDURE — 93306 TTE W/DOPPLER COMPLETE: CPT | Mod: 26

## 2023-08-15 PROCEDURE — 99232 SBSQ HOSP IP/OBS MODERATE 35: CPT

## 2023-08-15 RX ORDER — APIXABAN 2.5 MG/1
2.5 TABLET, FILM COATED ORAL EVERY 12 HOURS
Refills: 0 | Status: DISCONTINUED | OUTPATIENT
Start: 2023-08-15 | End: 2023-08-20

## 2023-08-15 RX ORDER — DILTIAZEM HCL 120 MG
30 CAPSULE, EXT RELEASE 24 HR ORAL EVERY 6 HOURS
Refills: 0 | Status: DISCONTINUED | OUTPATIENT
Start: 2023-08-15 | End: 2023-08-17

## 2023-08-15 RX ORDER — APIXABAN 2.5 MG/1
5 TABLET, FILM COATED ORAL EVERY 12 HOURS
Refills: 0 | Status: DISCONTINUED | OUTPATIENT
Start: 2023-08-15 | End: 2023-08-15

## 2023-08-15 RX ORDER — POTASSIUM CHLORIDE 20 MEQ
10 PACKET (EA) ORAL
Refills: 0 | Status: COMPLETED | OUTPATIENT
Start: 2023-08-15 | End: 2023-08-15

## 2023-08-15 RX ORDER — APIXABAN 2.5 MG/1
2.5 TABLET, FILM COATED ORAL EVERY 12 HOURS
Refills: 0 | Status: DISCONTINUED | OUTPATIENT
Start: 2023-08-15 | End: 2023-08-15

## 2023-08-15 RX ORDER — INSULIN LISPRO 100/ML
VIAL (ML) SUBCUTANEOUS AT BEDTIME
Refills: 0 | Status: DISCONTINUED | OUTPATIENT
Start: 2023-08-15 | End: 2023-08-20

## 2023-08-15 RX ORDER — INSULIN LISPRO 100/ML
VIAL (ML) SUBCUTANEOUS
Refills: 0 | Status: DISCONTINUED | OUTPATIENT
Start: 2023-08-15 | End: 2023-08-20

## 2023-08-15 RX ORDER — POTASSIUM CHLORIDE 20 MEQ
40 PACKET (EA) ORAL EVERY 4 HOURS
Refills: 0 | Status: COMPLETED | OUTPATIENT
Start: 2023-08-15 | End: 2023-08-15

## 2023-08-15 RX ADMIN — Medication 100 MILLIEQUIVALENT(S): at 12:28

## 2023-08-15 RX ADMIN — MUPIROCIN 1 APPLICATION(S): 20 OINTMENT TOPICAL at 17:03

## 2023-08-15 RX ADMIN — PIPERACILLIN AND TAZOBACTAM 25 GRAM(S): 4; .5 INJECTION, POWDER, LYOPHILIZED, FOR SOLUTION INTRAVENOUS at 05:43

## 2023-08-15 RX ADMIN — HEPARIN SODIUM 5000 UNIT(S): 5000 INJECTION INTRAVENOUS; SUBCUTANEOUS at 05:41

## 2023-08-15 RX ADMIN — Medication 1: at 12:24

## 2023-08-15 RX ADMIN — CHLORHEXIDINE GLUCONATE 1 APPLICATION(S): 213 SOLUTION TOPICAL at 05:21

## 2023-08-15 RX ADMIN — Medication 100 MILLIEQUIVALENT(S): at 13:43

## 2023-08-15 RX ADMIN — Medication 1: at 17:02

## 2023-08-15 RX ADMIN — Medication 40 MILLIEQUIVALENT(S): at 13:44

## 2023-08-15 RX ADMIN — Medication 30 MILLIGRAM(S): at 09:38

## 2023-08-15 RX ADMIN — SODIUM CHLORIDE 4 MILLILITER(S): 9 INJECTION INTRAMUSCULAR; INTRAVENOUS; SUBCUTANEOUS at 07:34

## 2023-08-15 RX ADMIN — MUPIROCIN 1 APPLICATION(S): 20 OINTMENT TOPICAL at 05:41

## 2023-08-15 RX ADMIN — Medication 1: at 05:42

## 2023-08-15 RX ADMIN — Medication 30 MILLIGRAM(S): at 12:25

## 2023-08-15 RX ADMIN — APIXABAN 2.5 MILLIGRAM(S): 2.5 TABLET, FILM COATED ORAL at 17:02

## 2023-08-15 RX ADMIN — Medication 30 MILLIGRAM(S): at 23:19

## 2023-08-15 RX ADMIN — MIRTAZAPINE 7.5 MILLIGRAM(S): 45 TABLET, ORALLY DISINTEGRATING ORAL at 12:25

## 2023-08-15 RX ADMIN — Medication 40 MILLIEQUIVALENT(S): at 17:05

## 2023-08-15 RX ADMIN — ATORVASTATIN CALCIUM 40 MILLIGRAM(S): 80 TABLET, FILM COATED ORAL at 21:35

## 2023-08-15 RX ADMIN — Medication 50 MILLIGRAM(S): at 21:36

## 2023-08-15 RX ADMIN — Medication 100 MILLIEQUIVALENT(S): at 11:23

## 2023-08-15 RX ADMIN — SODIUM CHLORIDE 4 MILLILITER(S): 9 INJECTION INTRAMUSCULAR; INTRAVENOUS; SUBCUTANEOUS at 19:43

## 2023-08-15 RX ADMIN — PANTOPRAZOLE SODIUM 40 MILLIGRAM(S): 20 TABLET, DELAYED RELEASE ORAL at 12:25

## 2023-08-15 RX ADMIN — PIPERACILLIN AND TAZOBACTAM 25 GRAM(S): 4; .5 INJECTION, POWDER, LYOPHILIZED, FOR SOLUTION INTRAVENOUS at 13:43

## 2023-08-15 RX ADMIN — Medication 50 MILLIGRAM(S): at 05:41

## 2023-08-15 RX ADMIN — PIPERACILLIN AND TAZOBACTAM 25 GRAM(S): 4; .5 INJECTION, POWDER, LYOPHILIZED, FOR SOLUTION INTRAVENOUS at 21:38

## 2023-08-15 RX ADMIN — Medication 30 MILLIGRAM(S): at 17:02

## 2023-08-15 RX ADMIN — Medication 50 MILLIGRAM(S): at 13:43

## 2023-08-15 NOTE — PROGRESS NOTE ADULT - ASSESSMENT
74 y/o M with PMHx DM2, HTN, HLD A-fib (on Eliquis), TBI (s/p SDH with EVAC on 9/2021), and traumatic subdural hemorrhage in 10/2022, BIBEMS with nausea and vomiting. Admitted to ICU for sepsis 2/2 PNA, LATOYA and metabolic acidosis

## 2023-08-15 NOTE — PROGRESS NOTE ADULT - PROBLEM SELECTOR PLAN 5
Likely 2/2 deconditioning/immobility   - On home gabapentin - hold  - Further management per ICU
Likely 2/2 deconditioning/immobility   - On home gabapentin  - Further management per ICU
Likely 2/2 deconditioning/immobility   - On home gabapentin - hold

## 2023-08-15 NOTE — PROGRESS NOTE ADULT - PROBLEM SELECTOR PLAN 2
Baseline 0.6-0.8, per chart review  - Cr 1.5 on admission and worsening (2.0 today)  - dced Bicarb gtt for metabolic acidosis in ICU - now resolved  - monitor electrolytes  - avoid nephrotoxic meds Baseline 0.6-0.8, per chart review  - Cr 1.5 on admission and worsening (2.0 today)  - dced Bicarb gtt for metabolic acidosis in ICU - now resolved  - avoid nephrotoxic meds

## 2023-08-15 NOTE — PROGRESS NOTE ADULT - PROBLEM SELECTOR PLAN 1
- CT Chest: Bilateral pulmonary ground glass opacities likely reflects atypical pneumonia.  - S/p Vanc, Zosyn, Cefepime, Azithromycin in ED.   - Continue IV Zosyn  - Dc IV azithro and given 1 dose of vanc b/c was found to be MRSA +  - continue bactroban  5 days bid   - CT A/P - concerning for colitis  - Levophed gtt for septic shock in ICU - now off levophed  - Cont supp O2 to maintain O2 sat >92%  - Blood Cx (prelim - NGTD), Urine Cx (final - no growth) Likely 2/2 aspiration PNA  - CT Chest: Bilateral pulmonary ground glass opacities likely reflects atypical pneumonia.  - S/p Vanc, Zosyn, Cefepime, Azithromycin in ED.   - Continue IV Zosyn (today day 3)  - Dc IV azithro and given 1 dose of vanc b/c was found to be MRSA +  - continue bactroban  5 days bid   - CT A/P - concerning for colitis  - Levophed gtt for septic shock in ICU - now off levophed  - Cont supp O2 to maintain O2 sat >92%  - Blood Cx (prelim - NGTD), Urine Cx (final - no growth)  - Diet advanced to Likely 2/2 aspiration PNA  - CT Chest: Bilateral pulmonary ground glass opacities likely reflects atypical pneumonia.  - S/p Vanc, Zosyn, Cefepime, Azithromycin in ED.   - Continue IV Zosyn (today day 3)/7   - Dc IV azithro and given 1 dose of vanc b/c was found to be MRSA +  - continue bactroban  5 days bid   - CT A/P - concerning for colitis but no clinical sign /symptom of acute colitis   - Levophed gtt for septic shock in ICU - now off levophed  - Cont supp O2 to maintain O2 sat >92%  - Blood Cx (prelim - NGTD), Urine Cx (final - no growth)  - Diet advanced

## 2023-08-15 NOTE — PROGRESS NOTE ADULT - PROBLEM SELECTOR PLAN 4
CT A/P: Colitis of distal sigmoid colon  - Diarrhea likely 2/2 inflammation vs infectious colitis. Low suspicion for C. diff  - Bowel rest, IVF CT A/P: Colitis of distal sigmoid colon  - Diarrhea likely 2/2 inflammation vs infectious colitis. Low suspicion for C. diff  - Diet advanced to soft bite sized solid and thin liquids.  - IVF CT A/P: Colitis of distal sigmoid colon  - Diarrhea likely 2/2 inflammation vs infectious colitis. Low suspicion for C. diff  - Diet advanced to soft bite sized solid and thin liquids.

## 2023-08-15 NOTE — PROGRESS NOTE ADULT - ATTENDING COMMENTS
pt seen and examine today see  above plan - 72 y/o M with PMHx DM2, HTN, HLD A-fib (on Eliquis), TBI (s/p SDH with EVAC on 9/2021), and traumatic subdural hemorrhage in 10/2022, BIBEMS with nausea and vomiting. Admitted to ICU for sepsis  poa 2/2 PNA, LATOYA and metabolic acidosis. Prognosis  guarded. wife aware tt/plan.

## 2023-08-15 NOTE — PROGRESS NOTE ADULT - SUBJECTIVE AND OBJECTIVE BOX
Patient is a 73y old  Male who presents with a chief complaint of Sepsis (15 Aug 2023 08:01)      Subjective:  INTERVAL HPI/OVERNIGHT EVENTS: Patient seen and examined at bedside. No overnight events occurred. Pt was moved to medicine floors after being stabilized in ICU. Pt wife at bedside and reports Pt is looking much better. Wife is concerned about his NPO status and his previous Hx of sacral ulcer. Reassured. Pt is mostly non-verbal at baseline, but responds to yes or no. Pt denies any pain, but otherwise unable to assess the rest of ROS.    MEDICATIONS  (STANDING):  atorvastatin 40 milliGRAM(s) Oral at bedtime  chlorhexidine 2% Cloths 1 Application(s) Topical every 24 hours  dextrose 5%. 1000 milliLiter(s) (100 mL/Hr) IV Continuous <Continuous>  dextrose 5%. 1000 milliLiter(s) (50 mL/Hr) IV Continuous <Continuous>  dextrose 50% Injectable 25 Gram(s) IV Push once  dextrose 50% Injectable 12.5 Gram(s) IV Push once  dextrose 50% Injectable 25 Gram(s) IV Push once  diltiazem    Tablet 30 milliGRAM(s) Oral every 6 hours  glucagon  Injectable 1 milliGRAM(s) IntraMuscular once  heparin   Injectable 5000 Unit(s) SubCutaneous every 8 hours  hydrocortisone sodium succinate Injectable 50 milliGRAM(s) IV Push every 8 hours  insulin lispro (ADMELOG) corrective regimen sliding scale   SubCutaneous every 6 hours  mirtazapine 7.5 milliGRAM(s) Oral daily  mupirocin 2% Nasal 1 Application(s) Both Nostrils every 12 hours  pantoprazole  Injectable 40 milliGRAM(s) IV Push daily  piperacillin/tazobactam IVPB.. 3.375 Gram(s) IV Intermittent every 8 hours  sodium chloride 3%  Inhalation 4 milliLiter(s) Inhalation every 12 hours    MEDICATIONS  (PRN):  dextrose Oral Gel 15 Gram(s) Oral once PRN Blood Glucose LESS THAN 70 milliGRAM(s)/deciliter  sodium chloride 0.9% lock flush 10 milliLiter(s) IV Push every 1 hour PRN Pre/post blood products, medications, blood draw, and to maintain line patency      Allergies    No Known Allergies    Intolerances        REVIEW OF SYSTEMS:  unable to obtain as per HPI    Objective:  Vital Signs Last 24 Hrs  T(C): 36.6 (15 Aug 2023 05:01), Max: 37.1 (14 Aug 2023 20:25)  T(F): 97.9 (15 Aug 2023 05:01), Max: 98.8 (14 Aug 2023 20:25)  HR: 70 (15 Aug 2023 08:22) (70 - 108)  BP: 132/71 (15 Aug 2023 05:01) (98/67 - 136/81)  BP(mean): 98 (14 Aug 2023 18:00) (78 - 100)  RR: 18 (15 Aug 2023 05:01) (13 - 18)  SpO2: 97% (15 Aug 2023 05:01) (93% - 98%)    Parameters below as of 15 Aug 2023 05:01  Patient On (Oxygen Delivery Method): nasal cannula  O2 Flow (L/min): 3      GENERAL: NAD, lying in bed comfortably w/ NC  HEAD:  Atraumatic, Normocephalic  EYES: conjunctiva and sclera clear  ENT: dry mucous membranes  NECK: Supple  CHEST/LUNG: Clear to auscultation bilaterally, but audible grunting is noted. No distress.; No rales, rhonchi. Unlabored respirations  HEART: Regular rate and rhythm; No rubs, or gallops  ABDOMEN: Bowel sounds present; Soft, Nontender, Nondistended   EXTREMITIES: 2+ Peripheral Pulses (radial), No clubbing, cyanosis, or edema  NERVOUS SYSTEM:  Alert & Oriented X1 (name), responds sometimes to yes/no questions. Does not follow commands.  MSK: +R hand digits and arm contracted 2/2 TBI hx.     LABS:    CBC Full  -  ( 14 Aug 2023 08:40 )  WBC Count : 9.95 K/uL  Hemoglobin : 8.5 g/dL  Hematocrit : 26.1 %  Platelet Count - Automated : 176 K/uL  Mean Cell Volume : 75.7 fl  Mean Cell Hemoglobin : 24.6 pg  Mean Cell Hemoglobin Concentration : 32.6 gm/dL  Auto Neutrophil # : x  Auto Lymphocyte # : x  Auto Monocyte # : x  Auto Eosinophil # : x  Auto Basophil # : x  Auto Neutrophil % : x  Auto Lymphocyte % : x  Auto Monocyte % : x  Auto Eosinophil % : x  Auto Basophil % : x      Ca    8.2        14 Aug 2023 05:22      PT/INR - ( 15 Aug 2023 05:25 )   PT: 14.7 sec;   INR: 1.27 ratio         PTT - ( 15 Aug 2023 05:25 )  PTT:28.6 sec  Urinalysis Basic - ( 14 Aug 2023 05:22 )    Color: x / Appearance: x / SG: x / pH: x  Gluc: 171 mg/dL / Ketone: x  / Bili: x / Urobili: x   Blood: x / Protein: x / Nitrite: x   Leuk Esterase: x / RBC: x / WBC x   Sq Epi: x / Non Sq Epi: x / Bacteria: x      CAPILLARY BLOOD GLUCOSE      POCT Blood Glucose.: 151 mg/dL (15 Aug 2023 05:37)  POCT Blood Glucose.: 167 mg/dL (14 Aug 2023 23:22)  POCT Blood Glucose.: 185 mg/dL (14 Aug 2023 17:20)  POCT Blood Glucose.: 142 mg/dL (14 Aug 2023 12:36)        Culture - Urine (collected 08-13-23 @ 09:37)  Source: Catheterized Catheterized  Final Report (08-14-23 @ 12:34):    No growth    Culture - Blood (collected 08-12-23 @ 23:47)  Source: .Blood Blood-Peripheral  Preliminary Report (08-15-23 @ 07:02):    No growth at 48 Hours    Culture - Blood (collected 08-12-23 @ 23:40)  Source: .Blood Blood-Peripheral  Preliminary Report (08-15-23 @ 07:02):    No growth at 48 Hours        RADIOLOGY & ADDITIONAL TESTS:  None  Personally reviewed.     Consultant(s) Notes Reviewed:  [x] YES  [ ] NO     Patient is a 73y old  Male who presents with a chief complaint of Sepsis (15 Aug 2023 08:01)      Subjective:  INTERVAL HPI/OVERNIGHT EVENTS: Patient seen and examined at bedside. No overnight events occurred. Pt was moved to medicine floors after being stabilized in ICU. Pt wife at bedside and reports Pt is looking much better.   pt passed speech and swallow  evaluation  , pt t is mostly non-verbal at baseline, but responds to yes or no. Pt denies any pain, but otherwise unable to assess the rest of ROS.              REVIEW OF SYSTEMS:  unable to obtain as per HPI    Objective:  Vital Signs Last 24 Hrs  T(C): 36.6 (15 Aug 2023 05:01), Max: 37.1 (14 Aug 2023 20:25)  T(F): 97.9 (15 Aug 2023 05:01), Max: 98.8 (14 Aug 2023 20:25)  HR: 70 (15 Aug 2023 08:22) (70 - 108)  BP: 132/71 (15 Aug 2023 05:01) (98/67 - 136/81)  BP(mean): 98 (14 Aug 2023 18:00) (78 - 100)  RR: 18 (15 Aug 2023 05:01) (13 - 18)  SpO2: 97% (15 Aug 2023 05:01) (93% - 98%)    Parameters below as of 15 Aug 2023 05:01  Patient On (Oxygen Delivery Method): nasal cannula  O2 Flow (L/min): 3      GENERAL: NAD,   HEAD:  Atraumatic, Normocephalic  EYES: conjunctiva and sclera clear  ENT: dry mucous membranes  NECK: Supple  CHEST/LUNG: auscultation bilaterally . No distress.; No rales, rhonchi.   HEART: Regular rate and rhythm; No tachy   ABDOMEN: Bowel sounds present; Soft, Nontender, Nondistended   EXTREMITIES: 2+ Peripheral Pulses (radial), No clubbing, cyanosis, or edema  NERVOUS SYSTEM:  Alert & Oriented X1 (name), responds sometimes to yes/no questions.  MSK: rt arm contracted      MEDICATIONS  (STANDING):  atorvastatin 40 milliGRAM(s) Oral at bedtime  chlorhexidine 2% Cloths 1 Application(s) Topical every 24 hours  dextrose 5%. 1000 milliLiter(s) (100 mL/Hr) IV Continuous <Continuous>  dextrose 5%. 1000 milliLiter(s) (50 mL/Hr) IV Continuous <Continuous>  dextrose 50% Injectable 25 Gram(s) IV Push once  dextrose 50% Injectable 12.5 Gram(s) IV Push once  dextrose 50% Injectable 25 Gram(s) IV Push once  diltiazem    Tablet 30 milliGRAM(s) Oral every 6 hours  glucagon  Injectable 1 milliGRAM(s) IntraMuscular once  heparin   Injectable 5000 Unit(s) SubCutaneous every 8 hours  hydrocortisone sodium succinate Injectable 50 milliGRAM(s) IV Push every 8 hours  insulin lispro (ADMELOG) corrective regimen sliding scale   SubCutaneous every 6 hours  mirtazapine 7.5 milliGRAM(s) Oral daily  mupirocin 2% Nasal 1 Application(s) Both Nostrils every 12 hours  pantoprazole  Injectable 40 milliGRAM(s) IV Push daily  piperacillin/tazobactam IVPB.. 3.375 Gram(s) IV Intermittent every 8 hours  sodium chloride 3%  Inhalation 4 milliLiter(s) Inhalation every 12 hours    MEDICATIONS  (PRN):  dextrose Oral Gel 15 Gram(s) Oral once PRN Blood Glucose LESS THAN 70 milliGRAM(s)/deciliter  sodium chloride 0.9% lock flush 10 milliLiter(s) IV Push every 1 hour PRN Pre/post blood products, medications, blood draw, and to maintain line patency      Allergies    No Known Allergies  ntracted 2/2 TBI hx.     LABS:    CBC Full  -  ( 14 Aug 2023 08:40 )  WBC Count : 9.95 K/uL  Hemoglobin : 8.5 g/dL  Hematocrit : 26.1 %  Platelet Count - Automated : 176 K/uL  Mean Cell Volume : 75.7 fl  Mean Cell Hemoglobin : 24.6 pg  Mean Cell Hemoglobin Concentration : 32.6 gm/dL  Auto Neutrophil # : x  Auto Lymphocyte # : x  Auto Monocyte # : x  Auto Eosinophil # : x  Auto Basophil # : x  Auto Neutrophil % : x  Auto Lymphocyte % : x  Auto Monocyte % : x  Auto Eosinophil % : x  Auto Basophil % : x      Ca    8.2        14 Aug 2023 05:22      PT/INR - ( 15 Aug 2023 05:25 )   PT: 14.7 sec;   INR: 1.27 ratio         PTT - ( 15 Aug 2023 05:25 )  PTT:28.6 sec  Urinalysis Basic - ( 14 Aug 2023 05:22 )    Color: x / Appearance: x / SG: x / pH: x  Gluc: 171 mg/dL / Ketone: x  / Bili: x / Urobili: x   Blood: x / Protein: x / Nitrite: x   Leuk Esterase: x / RBC: x / WBC x   Sq Epi: x / Non Sq Epi: x / Bacteria: x      CAPILLARY BLOOD GLUCOSE      POCT Blood Glucose.: 151 mg/dL (15 Aug 2023 05:37)  POCT Blood Glucose.: 167 mg/dL (14 Aug 2023 23:22)  POCT Blood Glucose.: 185 mg/dL (14 Aug 2023 17:20)  POCT Blood Glucose.: 142 mg/dL (14 Aug 2023 12:36)        Culture - Urine (collected 08-13-23 @ 09:37)  Source: Catheterized Catheterized  Final Report (08-14-23 @ 12:34):    No growth    Culture - Blood (collected 08-12-23 @ 23:47)  Source: .Blood Blood-Peripheral  Preliminary Report (08-15-23 @ 07:02):    No growth at 48 Hours    Culture - Blood (collected 08-12-23 @ 23:40)  Source: .Blood Blood-Peripheral  Preliminary Report (08-15-23 @ 07:02):    No growth at 48 Hours        RADIOLOGY & ADDITIONAL TESTS:  None  Personally reviewed.     Consultant(s) Notes Reviewed:  [x] YES  [ ] NO

## 2023-08-15 NOTE — PROGRESS NOTE ADULT - PROBLEM SELECTOR PROBLEM 6
Type 2 diabetes mellitus with lactic acidosis

## 2023-08-15 NOTE — SWALLOW BEDSIDE ASSESSMENT ADULT - SWALLOW EVAL: DIAGNOSIS
1) Mild oral dysphagia for regular solids marked by prolonged manipulation, slow chewing, and reduced collection resulting in delayed posterior bolus transfer. Lingual stasis noted which was cleared with liquid wash. 2) Functional oral stage of swallow for soft and bite sized, puree, moderately thick fluids, mildly thick fluids, and thin fluids marked by adequate containment, bolus manipulation/mastication, and coordination. Anterior to posterior oral transit/transfer noted. 3) Functional pharyngeal stage of swallow for regular solids, soft and bite sized, puree, moderately thick fluids, mildly thick fluids, and thin fluids marked by initiation of pharyngeal swallow trigger and hyolaryngeal excursion noted upon digital palpation. No overt signs/symptoms of penetration/aspiration noted.

## 2023-08-15 NOTE — SWALLOW BEDSIDE ASSESSMENT ADULT - COMMENTS
Hospitalist 8/15 "72 y/o M with PMHx DM2, HTN, HLD A-fib (on Eliquis), TBI (s/p SDH with EVAC on 9/2021), and traumatic subdural hemorrhage in 10/2022, BIBEMS with nausea and vomiting. Admitted to ICU for sepsis 2/2 PNA, LATOYA and metabolic acidosis".     CT Chest 8/12 "Bilateral pulmonary groundglass opacities likely reflects atypical pneumonia."    Pt known to this service and seen on a previous admission on 1/9/23 with recommendations for Soft/bite sized solids and thin liquids (see report for details).    Pt seen at bedside, awake/alert, during clinical swallow evaluation. Wife present during assessment and provided case history information. Pt noted with limited verbal output, however able to answer yes/no questions and follow simple directions. Per wife, pt consumes easy to chew solids and thin liquids in the home environment.

## 2023-08-15 NOTE — PROGRESS NOTE ADULT - PROBLEM SELECTOR PLAN 7
Home regimen: Losartan, Hydralazine, Diltiazem  - Hold home Losartan, diltiazem and Hydralazine given hypotension/septic shock  - dced levophed gtt
Home regimen: Losartan, Hydralazine, Diltiazem  - Hold home Losartan, diltiazem and Hydralazine given hypotension/septic shock  - On Levophed gtt
Home regimen: Losartan, Hydralazine  - Hold home Losartan and Hydralazine given hypotension/septic shock  - dced levophed gtt in ICU  - restarted diltiazem at lower dose as per above.

## 2023-08-15 NOTE — PROGRESS NOTE ADULT - PROBLEM SELECTOR PLAN 3
Patient with chronic atrial fibrillation  - Tachycardic on admission, progressed to Afib with RVR likely 2/2 dehydration, hypotension, acute illness  - s/p Cardizem IVP 5mg on admission with improved rates  - Hold Eliquis for now, resume when safe.  - restarted diltiazem at lower dose 30 q6hr  - f/u TTE  - Cardio following, recs appreciated. Patient with chronic atrial fibrillation  - Tachycardic on admission, progressed to Afib with RVR likely 2/2 dehydration, hypotension, acute illness  - s/p Cardizem IVP 5mg on admission with improved rates  - restarted eliquis (renally dosed) given Hgb improvement and no active signs of bleed.   - restarted diltiazem at lower dose 30 q6hr  - f/u TTE  - Cardio following, recs appreciated. Patient with chronic atrial fibrillation  - Tachycardic on admission, progressed to Afib with RVR likely 2/2 dehydration, hypotension, acute illness  - s/p Cardizem IVP 5mg on admission with improved rates  - restarted eliquis (renally dosed) given Hgb improvement and no active signs of bleed.   - restarted diltiazem at lower dose 30 q6hr sec to soft bp   -  TTE- 2. Left ventricular systolic function is normal with an ejection fraction visually estimated at 60 to 65 %.  - Cardio following dr kemp

## 2023-08-15 NOTE — PROGRESS NOTE ADULT - PROBLEM SELECTOR PLAN 6
Well controlled, on home Metformin 1000mg BID. Not on home insulin  - P/w lactic acidosis in setting of acute illness, likely component of Metformin side effect as well  - hold oral anti-diabetic meds  - A1C: 5.5 in Jun 2023  - Low ISS Regular finger sticks  - NPO  - Hypoglycemic protocol. Well controlled, on home Metformin 1000mg BID. Not on home insulin  - P/w lactic acidosis in setting of acute illness, likely component of Metformin side effect as well  - hold oral anti-diabetic meds  - A1C: 5.5 in Jun 2023  - Low ISS Regular finger sticks  - advanced diet to soft bite sized solid with thin liquids.  - Hypoglycemic protocol.

## 2023-08-16 LAB
ALBUMIN SERPL ELPH-MCNC: 3 G/DL — LOW (ref 3.3–5)
ALP SERPL-CCNC: 63 U/L — SIGNIFICANT CHANGE UP (ref 40–120)
ALT FLD-CCNC: 16 U/L — SIGNIFICANT CHANGE UP (ref 12–78)
ANION GAP SERPL CALC-SCNC: 6 MMOL/L — SIGNIFICANT CHANGE UP (ref 5–17)
ANION GAP SERPL CALC-SCNC: 6 MMOL/L — SIGNIFICANT CHANGE UP (ref 5–17)
ANION GAP SERPL CALC-SCNC: 8 MMOL/L — SIGNIFICANT CHANGE UP (ref 5–17)
AST SERPL-CCNC: 10 U/L — LOW (ref 15–37)
BASOPHILS # BLD AUTO: 0 K/UL — SIGNIFICANT CHANGE UP (ref 0–0.2)
BASOPHILS NFR BLD AUTO: 0 % — SIGNIFICANT CHANGE UP (ref 0–2)
BILIRUB SERPL-MCNC: 0.6 MG/DL — SIGNIFICANT CHANGE UP (ref 0.2–1.2)
BUN SERPL-MCNC: 34 MG/DL — HIGH (ref 7–23)
BUN SERPL-MCNC: 37 MG/DL — HIGH (ref 7–23)
BUN SERPL-MCNC: 39 MG/DL — HIGH (ref 7–23)
CALCIUM SERPL-MCNC: 8.2 MG/DL — LOW (ref 8.5–10.1)
CALCIUM SERPL-MCNC: 8.3 MG/DL — LOW (ref 8.5–10.1)
CALCIUM SERPL-MCNC: 8.3 MG/DL — LOW (ref 8.5–10.1)
CHLORIDE SERPL-SCNC: 109 MMOL/L — HIGH (ref 96–108)
CHLORIDE SERPL-SCNC: 109 MMOL/L — HIGH (ref 96–108)
CHLORIDE SERPL-SCNC: 110 MMOL/L — HIGH (ref 96–108)
CO2 SERPL-SCNC: 27 MMOL/L — SIGNIFICANT CHANGE UP (ref 22–31)
CO2 SERPL-SCNC: 28 MMOL/L — SIGNIFICANT CHANGE UP (ref 22–31)
CO2 SERPL-SCNC: 29 MMOL/L — SIGNIFICANT CHANGE UP (ref 22–31)
CREAT SERPL-MCNC: 1.5 MG/DL — HIGH (ref 0.5–1.3)
CREAT SERPL-MCNC: 1.6 MG/DL — HIGH (ref 0.5–1.3)
CREAT SERPL-MCNC: 1.6 MG/DL — HIGH (ref 0.5–1.3)
EGFR: 46 ML/MIN/1.73M2 — LOW
EGFR: 46 ML/MIN/1.73M2 — LOW
EGFR: 49 ML/MIN/1.73M2 — LOW
EOSINOPHIL # BLD AUTO: 0.01 K/UL — SIGNIFICANT CHANGE UP (ref 0–0.5)
EOSINOPHIL NFR BLD AUTO: 0.1 % — SIGNIFICANT CHANGE UP (ref 0–6)
GLUCOSE SERPL-MCNC: 199 MG/DL — HIGH (ref 70–99)
GLUCOSE SERPL-MCNC: 218 MG/DL — HIGH (ref 70–99)
GLUCOSE SERPL-MCNC: 225 MG/DL — HIGH (ref 70–99)
HCT VFR BLD CALC: 30 % — LOW (ref 39–50)
HGB BLD-MCNC: 10 G/DL — LOW (ref 13–17)
IMM GRANULOCYTES NFR BLD AUTO: 0.6 % — SIGNIFICANT CHANGE UP (ref 0–0.9)
LYMPHOCYTES # BLD AUTO: 0.62 K/UL — LOW (ref 1–3.3)
LYMPHOCYTES # BLD AUTO: 6.9 % — LOW (ref 13–44)
MAGNESIUM SERPL-MCNC: 1.9 MG/DL — SIGNIFICANT CHANGE UP (ref 1.6–2.6)
MCHC RBC-ENTMCNC: 24.6 PG — LOW (ref 27–34)
MCHC RBC-ENTMCNC: 33.3 GM/DL — SIGNIFICANT CHANGE UP (ref 32–36)
MCV RBC AUTO: 73.9 FL — LOW (ref 80–100)
MONOCYTES # BLD AUTO: 0.63 K/UL — SIGNIFICANT CHANGE UP (ref 0–0.9)
MONOCYTES NFR BLD AUTO: 7 % — SIGNIFICANT CHANGE UP (ref 2–14)
NEUTROPHILS # BLD AUTO: 7.7 K/UL — HIGH (ref 1.8–7.4)
NEUTROPHILS NFR BLD AUTO: 85.4 % — HIGH (ref 43–77)
NRBC # BLD: 0 /100 WBCS — SIGNIFICANT CHANGE UP (ref 0–0)
PHOSPHATE SERPL-MCNC: 3 MG/DL — SIGNIFICANT CHANGE UP (ref 2.5–4.5)
PLATELET # BLD AUTO: 208 K/UL — SIGNIFICANT CHANGE UP (ref 150–400)
POTASSIUM SERPL-MCNC: 2.4 MMOL/L — CRITICAL LOW (ref 3.5–5.3)
POTASSIUM SERPL-MCNC: 2.6 MMOL/L — CRITICAL LOW (ref 3.5–5.3)
POTASSIUM SERPL-MCNC: 2.9 MMOL/L — CRITICAL LOW (ref 3.5–5.3)
POTASSIUM SERPL-SCNC: 2.4 MMOL/L — CRITICAL LOW (ref 3.5–5.3)
POTASSIUM SERPL-SCNC: 2.6 MMOL/L — CRITICAL LOW (ref 3.5–5.3)
POTASSIUM SERPL-SCNC: 2.9 MMOL/L — CRITICAL LOW (ref 3.5–5.3)
PROT SERPL-MCNC: 5.8 G/DL — LOW (ref 6–8.3)
RBC # BLD: 4.06 M/UL — LOW (ref 4.2–5.8)
RBC # FLD: 17.2 % — HIGH (ref 10.3–14.5)
SODIUM SERPL-SCNC: 142 MMOL/L — SIGNIFICANT CHANGE UP (ref 135–145)
SODIUM SERPL-SCNC: 145 MMOL/L — SIGNIFICANT CHANGE UP (ref 135–145)
SODIUM SERPL-SCNC: 145 MMOL/L — SIGNIFICANT CHANGE UP (ref 135–145)
WBC # BLD: 9.01 K/UL — SIGNIFICANT CHANGE UP (ref 3.8–10.5)
WBC # FLD AUTO: 9.01 K/UL — SIGNIFICANT CHANGE UP (ref 3.8–10.5)

## 2023-08-16 PROCEDURE — 99232 SBSQ HOSP IP/OBS MODERATE 35: CPT

## 2023-08-16 RX ORDER — POTASSIUM CHLORIDE 20 MEQ
40 PACKET (EA) ORAL EVERY 4 HOURS
Refills: 0 | Status: COMPLETED | OUTPATIENT
Start: 2023-08-16 | End: 2023-08-16

## 2023-08-16 RX ORDER — MAGNESIUM SULFATE 500 MG/ML
1 VIAL (ML) INJECTION ONCE
Refills: 0 | Status: COMPLETED | OUTPATIENT
Start: 2023-08-16 | End: 2023-08-16

## 2023-08-16 RX ORDER — POTASSIUM CHLORIDE 20 MEQ
10 PACKET (EA) ORAL
Refills: 0 | Status: COMPLETED | OUTPATIENT
Start: 2023-08-16 | End: 2023-08-16

## 2023-08-16 RX ORDER — POTASSIUM CHLORIDE 20 MEQ
40 PACKET (EA) ORAL ONCE
Refills: 0 | Status: COMPLETED | OUTPATIENT
Start: 2023-08-16 | End: 2023-08-16

## 2023-08-16 RX ADMIN — Medication 3: at 12:07

## 2023-08-16 RX ADMIN — Medication 40 MILLIEQUIVALENT(S): at 11:24

## 2023-08-16 RX ADMIN — Medication 50 MILLIGRAM(S): at 06:15

## 2023-08-16 RX ADMIN — Medication 1: at 21:29

## 2023-08-16 RX ADMIN — CHLORHEXIDINE GLUCONATE 1 APPLICATION(S): 213 SOLUTION TOPICAL at 05:21

## 2023-08-16 RX ADMIN — MUPIROCIN 1 APPLICATION(S): 20 OINTMENT TOPICAL at 18:09

## 2023-08-16 RX ADMIN — Medication 100 MILLIEQUIVALENT(S): at 09:27

## 2023-08-16 RX ADMIN — SODIUM CHLORIDE 4 MILLILITER(S): 9 INJECTION INTRAMUSCULAR; INTRAVENOUS; SUBCUTANEOUS at 19:42

## 2023-08-16 RX ADMIN — PIPERACILLIN AND TAZOBACTAM 25 GRAM(S): 4; .5 INJECTION, POWDER, LYOPHILIZED, FOR SOLUTION INTRAVENOUS at 06:25

## 2023-08-16 RX ADMIN — APIXABAN 2.5 MILLIGRAM(S): 2.5 TABLET, FILM COATED ORAL at 06:15

## 2023-08-16 RX ADMIN — Medication 40 MILLIEQUIVALENT(S): at 19:32

## 2023-08-16 RX ADMIN — Medication 50 MILLIGRAM(S): at 14:18

## 2023-08-16 RX ADMIN — APIXABAN 2.5 MILLIGRAM(S): 2.5 TABLET, FILM COATED ORAL at 18:09

## 2023-08-16 RX ADMIN — Medication 30 MILLIGRAM(S): at 18:09

## 2023-08-16 RX ADMIN — Medication 100 GRAM(S): at 19:31

## 2023-08-16 RX ADMIN — Medication 2: at 17:02

## 2023-08-16 RX ADMIN — Medication 100 MILLIEQUIVALENT(S): at 19:31

## 2023-08-16 RX ADMIN — PIPERACILLIN AND TAZOBACTAM 25 GRAM(S): 4; .5 INJECTION, POWDER, LYOPHILIZED, FOR SOLUTION INTRAVENOUS at 21:19

## 2023-08-16 RX ADMIN — PANTOPRAZOLE SODIUM 40 MILLIGRAM(S): 20 TABLET, DELAYED RELEASE ORAL at 11:24

## 2023-08-16 RX ADMIN — Medication 40 MILLIEQUIVALENT(S): at 21:19

## 2023-08-16 RX ADMIN — Medication 1: at 08:11

## 2023-08-16 RX ADMIN — PIPERACILLIN AND TAZOBACTAM 25 GRAM(S): 4; .5 INJECTION, POWDER, LYOPHILIZED, FOR SOLUTION INTRAVENOUS at 14:30

## 2023-08-16 RX ADMIN — MUPIROCIN 1 APPLICATION(S): 20 OINTMENT TOPICAL at 06:15

## 2023-08-16 RX ADMIN — MIRTAZAPINE 7.5 MILLIGRAM(S): 45 TABLET, ORALLY DISINTEGRATING ORAL at 11:24

## 2023-08-16 RX ADMIN — Medication 30 MILLIGRAM(S): at 11:24

## 2023-08-16 RX ADMIN — SODIUM CHLORIDE 4 MILLILITER(S): 9 INJECTION INTRAMUSCULAR; INTRAVENOUS; SUBCUTANEOUS at 07:43

## 2023-08-16 RX ADMIN — Medication 50 MILLIGRAM(S): at 21:19

## 2023-08-16 RX ADMIN — Medication 30 MILLIGRAM(S): at 23:23

## 2023-08-16 RX ADMIN — ATORVASTATIN CALCIUM 40 MILLIGRAM(S): 80 TABLET, FILM COATED ORAL at 21:19

## 2023-08-16 RX ADMIN — Medication 100 MILLIEQUIVALENT(S): at 11:15

## 2023-08-16 RX ADMIN — Medication 100 MILLIEQUIVALENT(S): at 12:50

## 2023-08-16 RX ADMIN — Medication 100 MILLIEQUIVALENT(S): at 22:00

## 2023-08-16 NOTE — CARE COORDINATION ASSESSMENT. - NSCAREPROVIDERS_GEN_ALL_CORE_FT
CARE PROVIDERS:  Accepting Physician: Ranulfo Boggs  Access Services: Lynnette Prasad  Admitting: Ranulfo Boggs  Attending: Mikel Miramontes  Case Management: Paulette Butterfield  Case Management: Vivek Norton  Clinical Doc. Improvement: Denise Oneill  Consultant: Christi Sahni  Consultant: Faviola Jane  Consultant: Harjit Hurtado  Consultant: Catherine Cox  Consultant: Erwin Edmondson  Consultant: Ovidio Blackburn  Consultant: Lexi Andujar  Consultant: Weil, Patricia  Consultant: Lois Lombardi  Covering Team: Justin Dawson  ED Attending: Osiel Fang  ED Nurse: Shannon Baker  Nurse: Arlnee Marquez  Nurse: Karena Oro  Ordered: ADM, User  Ordered: Doctor, Unknown  Ordered: ServiceAccount, [facility]  Outpatient Provider: Nigel Joe  Outpatient Provider: Mirta Goldsmith  Override: Izabella Buck  PCA/Nursing Assistant: Inocente Rivera  PCA/Nursing Assistant: Naheed Logan  Physical Therapy: Leonel Delgadillo  Primary Team: Kevin Chandler  Primary Team: Jayme Casiano  Primary Team: Harjit West  Primary Team: Mj Soler  Primary Team: Alli Barber  Primary Team: Allison Perez  Primary Team: Sherron Liu  Primary Team: Natalia Pruitt  Primary Team: Megha Morrissey  Primary Team: Yasmany Mcfarlane  Primary Team: Homa Mendoza  Primary Team: Gaudencio Witt  Primary Team: Sunil Salmon  Registered Dietitian: Gloria Kelley  Respiratory Therapy: Guillermo Kern  Speech Pathology: Lyric Birch  Student: Finkelstein, Alexa

## 2023-08-16 NOTE — PROGRESS NOTE ADULT - SUBJECTIVE AND OBJECTIVE BOX
CC:  TBI  . Ricky pneumonia   HPI:  74 y/o M with PMHx DM2, HTN, HLD A-fib (on Eliquis), TBI (s/p SDH with EVAC on 9/2021), and traumatic subdural hemorrhage in 10/2022, BIBEMS with hypoxia, nausea and vomiting. Patient was constipated for almost 2 weeks before wife gave him Miralax. He subsequently developed diarrhea for several days. This afternoon patient vomited and was more lethargic than normal. The home health aide was caring for the patient until the wife returned around 5:30pm and found him lying down and difficult to arouse. The wife called EMS. Currently, patient reports SOB, diffuse pain, and diarrhea. He is mostly non-verbal but is able to respond to yes/no questions when coached or with eye-blinking ques. The wife denies any sick contacts or medication changes. No BMs since admission.    Vitals: T 98.7F, HR 78 --> 131 (Afib), /63 --> 76/52, RR 16, SpO2 98% NRB  Labs: WBC 45, Lactate 14, CO2 11, BUN 31, Cr 1.5, Glucose 66, GFR 49, pro-BNP 1654  VBG: pH 7.2, pCO2 23, pO2 129, HCO3 9  EKG: Afib @ 98bpm, RVH    CT Head:   Persistent posterior parafalcine subdural hematoma identified measuring   approximately 4 mm in thickness, without interval change as compared to   prior exam from 6 hours ago. No new mass effect, intraventricular   extension, or new sites of intracranial hemorrhage identified.    Previously suspected subarachnoid hemorrhage at the right frontal high   convexity is favored to represent streak artifact.    Multifocal chronic infarcts present, superimposed upon stable background   white matter hypoattenuation.    CT A/P:   Bilateral pulmonary groundglass opacities likely reflects atypical   pneumonia.  Thickening versus underdistention of the distal sigmoid colon. Correlate   for symptoms of colitis.     (13 Aug 2023 04:03)    REVIEW OF SYSTEMS:    Patient denied fever, chills, abdominal pain, nausea, vomiting, cough, shortness of breath, chest pain or palpitations    Vital Signs Last 24 Hrs  T(C): 36.4 (16 Aug 2023 13:34), Max: 36.5 (16 Aug 2023 04:59)  T(F): 97.6 (16 Aug 2023 13:34), Max: 97.7 (16 Aug 2023 04:59)  HR: 85 (16 Aug 2023 13:34) (58 - 86)  BP: 151/92 (16 Aug 2023 13:34) (136/89 - 167/94)  BP(mean): --  RR: 18 (16 Aug 2023 13:34) (18 - 18)  SpO2: 100% (16 Aug 2023 13:34) (95% - 100%)    Parameters below as of 16 Aug 2023 13:34  Patient On (Oxygen Delivery Method): room air    I&O's Summary    15 Aug 2023 07:01  -  16 Aug 2023 07:00  --------------------------------------------------------  IN: 900 mL / OUT: 3400 mL / NET: -2500 mL    16 Aug 2023 07:01  -  16 Aug 2023 14:46  --------------------------------------------------------  IN: 450 mL / OUT: 700 mL / NET: -250 mL      PHYSICAL EXAM:  GENERAL: NAD, well-groomed  HEENT: PERRL, +EOMI, anicteric, no [place]  NECK: Supple, No JVD   CHEST/LUNG: CTA bilaterally; Normal effort  HEART: S1S2 Normal intensity, no murmurs, gallops or rubs noted  ABDOMEN: Soft, BS Normoactive, NT, ND, no HSM noted  EXTREMITIES:  2+ radial and DP pulses noted, no clubbing, cyanosis, or edema noted, Limited mobility   SKIN: No rashes or lesions noted  NEURO: A&Ox3, no focal deficits noted, CN II-XII intact  PSYCH: normal mood and affect; insight/judgement appropriate  LABS:                        10.0   9.01  )-----------( 208      ( 16 Aug 2023 06:07 )             30.0     08-16    142  |  109<H>  |  37<H>  ----------------------------<  218<H>  2.6<LL>   |  27  |  1.60<H>    Ca    8.3<L>      16 Aug 2023 08:59  Phos  3.0     08-16  Mg     1.9     08-16    TPro  5.8<L>  /  Alb  3.0<L>  /  TBili  0.6  /  DBili  x   /  AST  10<L>  /  ALT  16  /  AlkPhos  63  08-16    PT/INR - ( 15 Aug 2023 05:25 )   PT: 14.7 sec;   INR: 1.27 ratio         PTT - ( 15 Aug 2023 05:25 )  PTT:28.6 sec  Urinalysis Basic - ( 16 Aug 2023 08:59 )    Color: x / Appearance: x / SG: x / pH: x  Gluc: 218 mg/dL / Ketone: x  / Bili: x / Urobili: x   Blood: x / Protein: x / Nitrite: x   Leuk Esterase: x / RBC: x / WBC x   Sq Epi: x / Non Sq Epi: x / Bacteria: x      RADIOLOGY & ADDITIONAL TESTS:    MEDICATIONS:  MEDICATIONS  (STANDING):  apixaban 2.5 milliGRAM(s) Oral every 12 hours  atorvastatin 40 milliGRAM(s) Oral at bedtime  chlorhexidine 2% Cloths 1 Application(s) Topical every 24 hours  dextrose 5%. 1000 milliLiter(s) (100 mL/Hr) IV Continuous <Continuous>  dextrose 5%. 1000 milliLiter(s) (50 mL/Hr) IV Continuous <Continuous>  dextrose 50% Injectable 25 Gram(s) IV Push once  dextrose 50% Injectable 12.5 Gram(s) IV Push once  dextrose 50% Injectable 25 Gram(s) IV Push once  diltiazem    Tablet 30 milliGRAM(s) Oral every 6 hours  glucagon  Injectable 1 milliGRAM(s) IntraMuscular once  hydrocortisone sodium succinate Injectable 50 milliGRAM(s) IV Push every 8 hours  insulin lispro (ADMELOG) corrective regimen sliding scale   SubCutaneous three times a day before meals  insulin lispro (ADMELOG) corrective regimen sliding scale   SubCutaneous at bedtime  mirtazapine 7.5 milliGRAM(s) Oral daily  mupirocin 2% Nasal 1 Application(s) Both Nostrils every 12 hours  pantoprazole  Injectable 40 milliGRAM(s) IV Push daily  piperacillin/tazobactam IVPB.. 3.375 Gram(s) IV Intermittent every 8 hours  sodium chloride 3%  Inhalation 4 milliLiter(s) Inhalation every 12 hours    MEDICATIONS  (PRN):  dextrose Oral Gel 15 Gram(s) Oral once PRN Blood Glucose LESS THAN 70 milliGRAM(s)/deciliter  sodium chloride 0.9% lock flush 10 milliLiter(s) IV Push every 1 hour PRN Pre/post blood products, medications, blood draw, and to maintain line patency

## 2023-08-16 NOTE — PROVIDER CONTACT NOTE (CRITICAL VALUE NOTIFICATION) - SITUATION
critical lactate 6.7
Critical Potassium 2.6
repeat critical lactate 6.7
Critical Potassium 2.4
Critical potassium 2.9
Critical potassium 2.6
critical WBC WBC 50.24

## 2023-08-16 NOTE — PROGRESS NOTE ADULT - ASSESSMENT
72 y/o M with PMHx DM2, HTN, HLD A-fib (on Eliquis), TBI (s/p SDH with EVAC on 9/2021), and traumatic subdural hemorrhage in 10/2022, BIBEMS with nausea and vomiting. Admitted to ICU for sepsis 2/2 PNA, LATOYA and metabolic acidosis       Problem/Plan - 1:  ·  Problem: Sepsis with acute hypoxic respiratory failure.   ·  Plan: Likely 2/2 aspiration PNA  - CT Chest: Bilateral pulmonary ground glass opacities   - S/p Vanc,  Cefepime, Azithromycin in ED.   - Continue IV Zosyn   - continue bactroban  5 days bid   - CT A/P - concerning for colitis but no clinical sign /symptom of acute colitis   - Cont supp O2 to maintain O2 sat >92%  - Blood Cx (prelim - NGTD), Urine Cx (final - no growth)  - Diet advanced.     Problem/Plan - 2:  ·  Problem: LATOYA (acute kidney injury).   ·  Plan: Baseline 0.6-0.8, per chart review  - Cr 1.5 on admission and worsening   - dced Bicarb gtt for metabolic acidosis in ICU - now resolved  - avoid nephrotoxic meds.     Problem/Plan - 3:  ·  Problem: Chronic atrial fibrillation.   ·  Plan: Patient with chronic atrial fibrillation  - Tachycardic on admission, progressed to Afib with RVR likely 2/2 dehydration, hypotension, acute illness  - s/p Cardizem IVP 5mg on admission with improved rates  - restarted eliquis (renally dosed) given Hgb improvement and no active signs of bleed.   - restarted diltiazem at lower dose 30 q6hr sec to soft bp   -  TTE- 2. Left ventricular systolic function is normal with an ejection fraction visually estimated at 60 to 65 %.  - Cardio following dr kemp.     Problem/Plan - 4:  ·  Problem: Acute colitis.   ·  Plan: CT A/P: Colitis of distal sigmoid colon  - Diarrhea likely 2/2 inflammation vs infectious colitis. Low suspicion for C. diff  - Diet advanced to soft bite sized solid and thin liquids.     Problem/Plan - 5:  ·  Problem: Chronic generalized pain.   ·  Plan: Likely 2/2 deconditioning/immobility   - On home gabapentin - hold.     Problem/Plan - 6:  ·  Problem: Type 2 diabetes mellitus with lactic acidosis.   ·  Plan: Well controlled, on home Metformin 1000mg BID. Not on home insulin  - P/w lactic acidosis in setting of acute illness, likely component of Metformin side effect as well  - A1C: 5.5 in Jun 2023  - Low ISS Regular finger sticks  - advanced diet to soft bite sized solid with thin liquids.     Problem/Plan - 7:  ·  Problem: Hypertension.   ·  Plan: Home regimen: Losartan, Hydralazine  - Hold home Losartan and Hydralazine given hypotension/septic shock  - dced levophed gtt in ICU  - restarted diltiazem at lower dose as per above.     Problem/Plan - 8:  ·  Problem: Hyperlipidemia.   ·  Plan: - Continue statin.     Problem/Plan - 9:  ·  Problem: Need for prophylactic measure.   ·  Plan: DVT ppx: started Eliquis - as pt have hx of  dvt.

## 2023-08-16 NOTE — PROGRESS NOTE ADULT - ASSESSMENT
73M with PMHx DM2, HTN, HLD A-fib (on Eliquis), TBI (s/p SDH with EVAC on 9/2021), and traumatic subdural hemorrhage in 10/2022, BIBEMS with hypoxia, nausea and vomiting. Admitted to ICU for sepsis pneumonia, bin, and metabolic acidosis. Cardiologist: Dr. Mirta Goldsmith    A fib, HTN, HLD  - EKG showed Afib, no acute changes on EKG compared to previous.  - No clear evidence of acute ischemia, trops negative x 2.   - Continue Lipitor     - Pt currently in AFib, was initially tachy, now rate controlled on tele   - Resumed Eliquis @ 2.5 mg   - In the past when AC was held due to subdural hemorrhage, patient developed DVT.  - Resumed Cardizem up titrate to 60 QQ6H    - Patient initially required pressors during this admission for hypotension. Patient was weaned off of pressors  - BP stable and controlled with -160s    - Previous TTE on 6/11/23 showed LVEF 60-65% with trace mitral regurgitation.  - No meaningful evidence of volume overload.    - Monitor and replete lytes, keep K>4, Mg>2.  - Will continue to follow.    Erwin Edmondson, MS FNP, AGACNP  Nurse Practitioner- Cardiology   Call teams (preferred)  Spectra #[phone] /[phone]     73M with PMHx DM2, HTN, HLD A-fib (on Eliquis), TBI (s/p SDH with EVAC on 9/2021), and traumatic subdural hemorrhage in 10/2022, BIBEMS with hypoxia, nausea and vomiting. Admitted to ICU for sepsis pneumonia, bin, and metabolic acidosis. Cardiologist: Dr. Mirta Goldsmith    A fib, HTN, HLD  - EKG showed Afib, no acute changes on EKG compared to previous.  - No clear evidence of acute ischemia, trops negative x 2.   - Continue Lipitor     - Pt currently in AFib, was initially tachy, now rate controlled on tele   - Resumed Eliquis @ 2.5 mg. Will discuss with primary team regarding full dose AC.   - In the past when AC was held due to subdural hemorrhage, patient developed DVT.  - Resumed Cardizem up titrate to 60 QQ6H    - Patient initially required pressors during this admission for hypotension. Patient was weaned off of pressors  - BP somewhat elevated, would resume home ARB when creatinine is back to baseline.     - Previous TTE on 6/11/23 showed LVEF 60-65% with trace mitral regurgitation.  - No meaningful evidence of volume overload.    - Monitor and replete lytes, keep K>4, Mg>2.  - Will continue to follow.    Erwin Edmondson, MS FNP, AGAP  Nurse Practitioner- Cardiology   Call teams (preferred)  Spectra #[phone] /[phone]

## 2023-08-16 NOTE — PROVIDER CONTACT NOTE (CRITICAL VALUE NOTIFICATION) - ACTION/TREATMENT ORDERED:
MD made aware. No new orders. potassium 3 riders and potassium chloride powder ordered prior.
continue IVF, lactate coming down
downtrending, continue current treatment
resident made aware
MD aware, will order antibiotics
No new orders recieved
Will repeat after fluids
NP/MD made aware. Repeat BMP ordered.
resident made aware, lactate is trending down
MD made aware.
NP made aware. Supplemental potassium to be ordered.

## 2023-08-16 NOTE — CARE COORDINATION ASSESSMENT. - OTHER PERTINENT DISCHARGE PLANNING INFORMATION:
met patient, then called patient's wife/caregiver Marj.  met with patient's wife/caregiver, role case management and transition plan at the hospital discussed, Marj verbalized understanding. Patient has difficulties with communication. He has h/o TBI due to Traumatic SDH. Patient has ML[facility] aides from [facility] [phone], Fax [phone], 10 hours  Monday to Friday and 8 hours on Saturday and Sunday. Patient is mostly bedbound but uses  ambulette and wheel chair. There is a WC ramp at his house. Patient was receiving speech therapy from [facility] prior to admission. Patient admitted for Pneumonia.

## 2023-08-16 NOTE — CARE COORDINATION ASSESSMENT. - NSPASTMEDSURGHISTORY_GEN_ALL_CORE_FT
PAST MEDICAL & SURGICAL HISTORY:  BPH (benign prostatic hyperplasia)      CAD (coronary artery disease)      HLD (hyperlipidemia)      Major depression      Peripheral neuropathy      Atrial fibrillation      HTN (hypertension)      TBI (traumatic brain injury)      Pneumonia due to COVID-19 virus  June 2022      Hemorrhage in the brain      H/O craniotomy

## 2023-08-16 NOTE — PROVIDER CONTACT NOTE (CRITICAL VALUE NOTIFICATION) - PERSON GIVING RESULT:
Bill
Tracey Little
Deann how
Fela from lab
Antonia from Lab
Lore from Lab
alejandra clark
Emmy Ball
Marguerite Redman
Susannah Redman
Tracey from lab

## 2023-08-16 NOTE — PHARMACOTHERAPY INTERVENTION NOTE - COMMENTS
72 y/o M with PMHx DM2, HTN, HLD A-fib (on Eliquis), TBI (s/p SDH with EVAC on 2021), and traumatic subdural hemorrhage in 10/2022. Patient currently ordered for Eliquis 2.5 twice daily. Patient does not meet criteria for dose reduction: Serum creatinine =1.5 mg/dL (133 micromole/L) and either =80 years of age or body weight =60 k.5 mg twice daily (Patient is 74 yo, 62.8 kg, Cr 1.6). Spoke with Cardiology GUNNAR Edmondson - ok to increase dose if ok with primary team. Reached out to GUNNAR Santos (Primary Team) - would like to hold off on dose increase as patient has history to TBI and subdural hemorrhage per clinical discretion. Will clarify with attending physician and adjust dose if necessary. 
Dosing vancomycin by level for PNA (mrsa pcr +), unstable GFR.  S/p 1 dose of vancomycin with level of 11.9.  Ordered dose of 750x1 per Dr. Hurtado during rounds with f/u level with AM labs 8/15

## 2023-08-16 NOTE — PATIENT CHOICE NOTE. - NSPTCHOICESTATE_GEN_ALL_CORE

## 2023-08-16 NOTE — PROVIDER CONTACT NOTE (CRITICAL VALUE NOTIFICATION) - NAME OF MD/NP/PA/DO NOTIFIED:
resident Dr. Mcfarlane
Dr. Fang
Dr. Harjit West
Gaudencio STEARNS
Abdi, PA
Homa Mendoza
Dr. Miramontes
Gaudencio STEARNS
Homa MAHER
Dr. Fang
resident dr. landin

## 2023-08-16 NOTE — PROVIDER CONTACT NOTE (CRITICAL VALUE NOTIFICATION) - TEST AND RESULT REPORTED:
Potassium
WBC= 45.52
lactate 6.7
lactate 10.7
Potassium
lactate 6.7
Potassium
Potassium
lactate 5.0
Lactate= 14.4
WBC 50.24

## 2023-08-16 NOTE — PROGRESS NOTE ADULT - SUBJECTIVE AND OBJECTIVE BOX
[facility] -- Alexey Briceno, Agus Sepulveda Savella, Harjit Andujar: Office # [phone]    Follow Up:  A NADIR genao    Subjective/Observations: Patient seen and examined. Patient awake, alert, resting in bed. No complaints of chest pain, dyspnea, Tolerating O2 via nasal cannula. + cough.     REVIEW OF SYSTEMS: All other review of systems are negative unless indicated above    PAST MEDICAL & SURGICAL HISTORY:  TBI (traumatic brain injury)      HTN (hypertension)      Atrial fibrillation      Peripheral neuropathy      Major depression      HLD (hyperlipidemia)      CAD (coronary artery disease)      BPH (benign prostatic hyperplasia)      Pneumonia due to COVID-19 virus  June 2022      Hemorrhage in the brain      H/O craniotomy    MEDICATIONS  (STANDING):  apixaban 2.5 milliGRAM(s) Oral every 12 hours  atorvastatin 40 milliGRAM(s) Oral at bedtime  chlorhexidine 2% Cloths 1 Application(s) Topical every 24 hours  dextrose 5%. 1000 milliLiter(s) (100 mL/Hr) IV Continuous <Continuous>  dextrose 5%. 1000 milliLiter(s) (50 mL/Hr) IV Continuous <Continuous>  dextrose 50% Injectable 25 Gram(s) IV Push once  dextrose 50% Injectable 12.5 Gram(s) IV Push once  dextrose 50% Injectable 25 Gram(s) IV Push once  diltiazem    Tablet 30 milliGRAM(s) Oral every 6 hours  glucagon  Injectable 1 milliGRAM(s) IntraMuscular once  hydrocortisone sodium succinate Injectable 50 milliGRAM(s) IV Push every 8 hours  insulin lispro (ADMELOG) corrective regimen sliding scale   SubCutaneous three times a day before meals  insulin lispro (ADMELOG) corrective regimen sliding scale   SubCutaneous at bedtime  mirtazapine 7.5 milliGRAM(s) Oral daily  mupirocin 2% Nasal 1 Application(s) Both Nostrils every 12 hours  pantoprazole  Injectable 40 milliGRAM(s) IV Push daily  piperacillin/tazobactam IVPB.. 3.375 Gram(s) IV Intermittent every 8 hours  sodium chloride 3%  Inhalation 4 milliLiter(s) Inhalation every 12 hours    MEDICATIONS  (PRN):  dextrose Oral Gel 15 Gram(s) Oral once PRN Blood Glucose LESS THAN 70 milliGRAM(s)/deciliter  sodium chloride 0.9% lock flush 10 milliLiter(s) IV Push every 1 hour PRN Pre/post blood products, medications, blood draw, and to maintain line patency    Allergies    No Known Allergies    Intolerances      Vital Signs Last 24 Hrs  T(C): 36.5 (16 Aug 2023 04:59), Max: 37.1 (15 Aug 2023 14:02)  T(F): 97.7 (16 Aug 2023 04:59), Max: 98.7 (15 Aug 2023 14:02)  HR: 70 (16 Aug 2023 07:43) (58 - 94)  BP: 157/91 (16 Aug 2023 04:59) (122/76 - 167/94)  BP(mean): --  RR: 18 (16 Aug 2023 12:24) (18 - 18)  SpO2: 95% (16 Aug 2023 12:24) (94% - 99%)    Parameters below as of 16 Aug 2023 12:24  Patient On (Oxygen Delivery Method): room air      I&O's Summary    15 Aug 2023 07:01  -  16 Aug 2023 07:00  --------------------------------------------------------  IN: 900 mL / OUT: 3400 mL / NET: -2500 mL    16 Aug 2023 07:01  -  16 Aug 2023 12:52  --------------------------------------------------------  IN: 450 mL / OUT: 0 mL / NET: 450 mL    TELE: A fib 60-70s, 3 beats NSVT  PHYSICAL EXAM:  Constitutional: NAD, awake and alert  HEENT: Moist Mucous Membranes, Anicteric  Pulmonary: Non-labored, breath sounds are clear bilaterally, No wheezing, rales or rhonchi  Cardiovascular: Regular, S1 and S2, No murmurs, No rubs, gallops or clicks  Gastrointestinal:  soft, nontender, nondistended   Lymph: trace peripheral edema. No lymphadenopathy.   Skin: No visible rashes or ulcers.  Neuro: Minimally verbal, unable to follow commands   Psych:  Mood & affect appropriate      LABS: All Labs Reviewed:                        10.0   9.01  )-----------( 208      ( 16 Aug 2023 06:07 )             30.0                         9.6    10.44 )-----------( 193      ( 15 Aug 2023 09:00 )             28.9                         8.5    9.95  )-----------( 176      ( 14 Aug 2023 08:40 )             26.1     16 Aug 2023 08:59    142    |  109    |  37     ----------------------------<  218    2.6     |  27     |  1.60   16 Aug 2023 06:07    145    |  109    |  39     ----------------------------<  199    2.4     |  28     |  1.60   15 Aug 2023 15:00    x      |  x      |  x      ----------------------------<  x      3.7     |  x      |  x        Ca    8.3        16 Aug 2023 08:59  Ca    8.2        16 Aug 2023 06:07  Ca    8.2        15 Aug 2023 09:00  Phos  3.0       16 Aug 2023 06:07  Phos  4.2       15 Aug 2023 09:00  Phos  5.0       14 Aug 2023 05:22  Mg     1.9       16 Aug 2023 06:07  Mg     1.9       15 Aug 2023 09:00  Mg     1.7       14 Aug 2023 05:22    TPro  5.8    /  Alb  3.0    /  TBili  0.6    /  DBili  x      /  AST  10     /  ALT  16     /  AlkPhos  63     16 Aug 2023 06:07  TPro  5.6    /  Alb  3.1    /  TBili  0.9    /  DBili  x      /  AST  13     /  ALT  13     /  AlkPhos  54     15 Aug 2023 09:00  TPro  5.4    /  Alb  3.1    /  TBili  0.6    /  DBili  x      /  AST  9      /  ALT  14     /  AlkPhos  51     14 Aug 2023 05:22   LIVER FUNCTIONS - ( 16 Aug 2023 06:07 )  Alb: 3.0 g/dL / Pro: 5.8 g/dL / ALK PHOS: 63 U/L / ALT: 16 U/L / AST: 10 U/L / GGT: x           PT/INR - ( 15 Aug 2023 05:25 )   PT: 14.7 sec;   INR: 1.27 ratio         PTT - ( 15 Aug 2023 05:25 )  PTT:28.6 secTroponin I, High Sensitivity Result: 76.9 ng/L (08-13-23 @ 09:33)  Troponin I, High Sensitivity Result: 6.7 ng/L (08-12-23 @ 21:50)  Lactate, Blood: 1.5 mmol/L (08-14-23 @ 05:22)  Lactate, Blood: 5.0 mmol/L (08-13-23 @ 18:30)  Lactate, Blood: 6.7 mmol/L (08-13-23 @ 14:56)    12 Lead ECG:   Ventricular Rate 98 BPM    QRS Duration 76 ms    Q-T Interval 342 ms    QTC Calculation(Bazett) 436 ms    R Axis 213 degrees    T Axis 19 degrees    Diagnosis Line Atrial fibrillation  Right ventricular hypertrophy  Inferior infarct (cited on or before 22-MAY-2022)  Anterolateral infarct (cited on or before 22-MAY-2022)  Abnormal ECG  When compared with ECG of 10-IVAN-2023 20:29,  Questionable change in initial forces of Septal leads  Confirmed by Lexi Andujar ([id number]) on 8/13/2023 7:25:42 AM  (08-12-23 @ 20:39)      TRANSTHORACIC ECHOCARDIOGRAM REPORT  ________________________________________________________________________________                                      _______       Pt. Name:       MELBA PARMAR Study Date:    8/14/2023  MRN:            [id number]       YOB: 1950  Accession #:    [id number]      Age:           73 years  Account#:       [id number]     Gender:        M  Heart Rate:                    Height:        69.00 in (175.26 cm)  Rhythm:                        Weight:  134.00 lb (60.78 kg)  Blood Pressure: 120/74 mmHg    BSA/BMI:       1.74 m² / 19.79 kg/m²  ________________________________________________________________________________________  Referring Physician:    [id number] Ranulfo Boggs  Interpreting Physician: Lexi Seals  Primary Sonographer:    Celia Whitmore RDCS    CPT:               ECHO TTE WO CON COMP W DOPP - 71345.m  Indication(s):     Shock, unspecified - R57.9  Procedure:         Transthoracic echocardiogram with 2-D, M-mode and complete                     spectral and color flow Doppler.  Ordering Location: ICU1  Study Information: Image quality for this study is technically difficult.    _______________________________________________________________________________________     CONCLUSIONS:      1. Technically difficult image quality.   2. Left ventricular systolic function is normal with an ejection fraction visually estimated at 60 to 65 %.   3. The right ventricle is not well visualized.   4. The left atrium is mildly dilated in size.   5. Aortic valve was not well visualized.   6. Trace mitral regurgitation.   7. Trace tricuspid regurgitation.    ________________________________________________________________________________________  FINDINGS:     Left Ventricle:  Left ventricular systolic function is normal with an ejection fraction visually estimated at 60 to 65%.     Right Ventricle:  The right ventricle is not well visualized. Normal wall thickness.     Left Atrium:  The left atrium is mildly dilated in size.     Right Atrium:  The right atrium is normal in size.     Aortic Valve:  The aortic valve was not well visualized.     Mitral Valve:  There is trace mitral regurgitation.     Tricuspid Valve:  There is trace tricuspid regurgitation. Estimated pulmonary artery systolic pressure is 29 mmHg.     Pulmonic Valve:  The pulmonic valve was not well visualized.     Pericardium:  No pericardial effusion seen.     Systemic Veins:  The inferior vena cava is normal in size (normal <2.1cm) with normal inspiratory collapse (normal >50%) consistent with normal right atrial pressure (~3, range 0-5mmHg).  ____________________________________________________________________  Quantitative Data:  Left Ventricle Measurements: (Indexed to BSA)     IVSd (2D):   1.2 cm  LVPWd (2D):  1.2 cm  LVIDd (2D):  4.1 cm  LVIDs (2D):  2.6 cm  LV Mass:     167 g  96.0 g/m²  Visualized LV EF%: 60 to 65%     MV E Vmax:    0.73 m/s  e' lateral:   12.00 cm/s  e' medial:    8.49 cm/s  E/e' lateral: 6.12  E/e' medial:  8.65  E/e' Average: 7.16  MV DT:        120 msec       Left Atrium Measurements: (Indexed to BSA)  LA Diam 2D: 4.10 cm       LVOT / RVOT/ Qp/Qs Data: (Indexed to BSA)  LVOT Diameter: 2.00 cm    Mitral Valve Measurements:     MV E Vmax: 0.7 m/s       Tricuspid Valve Measurements:     TR Vmax:          2.5 m/s  TR Peak Gradient: 25.6 mmHg  RA Pressure:      3 mmHg  PASP:             29 mmHg    ________________________________________________________________________________________  Electronically signed on8/15/2023 at 12:43:28 PM by Lexi Seals    *** Final ***

## 2023-08-17 LAB
ALBUMIN SERPL ELPH-MCNC: 3.1 G/DL — LOW (ref 3.3–5)
ALP SERPL-CCNC: 64 U/L — SIGNIFICANT CHANGE UP (ref 40–120)
ALT FLD-CCNC: 19 U/L — SIGNIFICANT CHANGE UP (ref 12–78)
ANION GAP SERPL CALC-SCNC: 8 MMOL/L — SIGNIFICANT CHANGE UP (ref 5–17)
ANION GAP SERPL CALC-SCNC: 9 MMOL/L — SIGNIFICANT CHANGE UP (ref 5–17)
APPEARANCE UR: CLEAR — SIGNIFICANT CHANGE UP
AST SERPL-CCNC: 11 U/L — LOW (ref 15–37)
BILIRUB SERPL-MCNC: 0.7 MG/DL — SIGNIFICANT CHANGE UP (ref 0.2–1.2)
BILIRUB UR-MCNC: NEGATIVE — SIGNIFICANT CHANGE UP
BUN SERPL-MCNC: 26 MG/DL — HIGH (ref 7–23)
BUN SERPL-MCNC: 28 MG/DL — HIGH (ref 7–23)
CALCIUM SERPL-MCNC: 8.3 MG/DL — LOW (ref 8.5–10.1)
CALCIUM SERPL-MCNC: 8.7 MG/DL — SIGNIFICANT CHANGE UP (ref 8.5–10.1)
CHLORIDE SERPL-SCNC: 110 MMOL/L — HIGH (ref 96–108)
CHLORIDE SERPL-SCNC: 111 MMOL/L — HIGH (ref 96–108)
CO2 SERPL-SCNC: 27 MMOL/L — SIGNIFICANT CHANGE UP (ref 22–31)
CO2 SERPL-SCNC: 28 MMOL/L — SIGNIFICANT CHANGE UP (ref 22–31)
COLOR SPEC: YELLOW — SIGNIFICANT CHANGE UP
CREAT SERPL-MCNC: 1.3 MG/DL — SIGNIFICANT CHANGE UP (ref 0.5–1.3)
CREAT SERPL-MCNC: 1.4 MG/DL — HIGH (ref 0.5–1.3)
DIFF PNL FLD: ABNORMAL
EGFR: 54 ML/MIN/1.73M2 — LOW
EGFR: 59 ML/MIN/1.73M2 — LOW
GLUCOSE SERPL-MCNC: 189 MG/DL — HIGH (ref 70–99)
GLUCOSE SERPL-MCNC: 245 MG/DL — HIGH (ref 70–99)
GLUCOSE UR QL: 100
HCT VFR BLD CALC: 29.2 % — LOW (ref 39–50)
HGB BLD-MCNC: 9.5 G/DL — LOW (ref 13–17)
KETONES UR-MCNC: NEGATIVE — SIGNIFICANT CHANGE UP
LEUKOCYTE ESTERASE UR-ACNC: NEGATIVE — SIGNIFICANT CHANGE UP
MAGNESIUM SERPL-MCNC: 2 MG/DL — SIGNIFICANT CHANGE UP (ref 1.6–2.6)
MCHC RBC-ENTMCNC: 24.5 PG — LOW (ref 27–34)
MCHC RBC-ENTMCNC: 32.5 GM/DL — SIGNIFICANT CHANGE UP (ref 32–36)
MCV RBC AUTO: 75.5 FL — LOW (ref 80–100)
NITRITE UR-MCNC: NEGATIVE — SIGNIFICANT CHANGE UP
NRBC # BLD: 0 /100 WBCS — SIGNIFICANT CHANGE UP (ref 0–0)
PH UR: 7 — SIGNIFICANT CHANGE UP (ref 5–8)
PHOSPHATE SERPL-MCNC: 2 MG/DL — LOW (ref 2.5–4.5)
PLATELET # BLD AUTO: 210 K/UL — SIGNIFICANT CHANGE UP (ref 150–400)
POTASSIUM SERPL-MCNC: 3 MMOL/L — LOW (ref 3.5–5.3)
POTASSIUM SERPL-MCNC: 3 MMOL/L — LOW (ref 3.5–5.3)
POTASSIUM SERPL-SCNC: 3 MMOL/L — LOW (ref 3.5–5.3)
POTASSIUM SERPL-SCNC: 3 MMOL/L — LOW (ref 3.5–5.3)
PROT SERPL-MCNC: 6.1 G/DL — SIGNIFICANT CHANGE UP (ref 6–8.3)
PROT UR-MCNC: 100
RBC # BLD: 3.87 M/UL — LOW (ref 4.2–5.8)
RBC # FLD: 17.3 % — HIGH (ref 10.3–14.5)
SODIUM SERPL-SCNC: 146 MMOL/L — HIGH (ref 135–145)
SODIUM SERPL-SCNC: 147 MMOL/L — HIGH (ref 135–145)
SP GR SPEC: 1.02 — SIGNIFICANT CHANGE UP (ref 1–1.03)
UROBILINOGEN FLD QL: 1 — SIGNIFICANT CHANGE UP (ref 0.2–1)
WBC # BLD: 8.69 K/UL — SIGNIFICANT CHANGE UP (ref 3.8–10.5)
WBC # FLD AUTO: 8.69 K/UL — SIGNIFICANT CHANGE UP (ref 3.8–10.5)

## 2023-08-17 PROCEDURE — 99233 SBSQ HOSP IP/OBS HIGH 50: CPT

## 2023-08-17 RX ORDER — SODIUM CHLORIDE 9 MG/ML
1000 INJECTION, SOLUTION INTRAVENOUS
Refills: 0 | Status: DISCONTINUED | OUTPATIENT
Start: 2023-08-17 | End: 2023-08-19

## 2023-08-17 RX ORDER — HYDROCORTISONE 20 MG
50 TABLET ORAL EVERY 12 HOURS
Refills: 0 | Status: COMPLETED | OUTPATIENT
Start: 2023-08-18 | End: 2023-08-18

## 2023-08-17 RX ORDER — POTASSIUM CHLORIDE 20 MEQ
10 PACKET (EA) ORAL
Refills: 0 | Status: COMPLETED | OUTPATIENT
Start: 2023-08-17 | End: 2023-08-18

## 2023-08-17 RX ORDER — POTASSIUM CHLORIDE 20 MEQ
40 PACKET (EA) ORAL EVERY 4 HOURS
Refills: 0 | Status: DISCONTINUED | OUTPATIENT
Start: 2023-08-17 | End: 2023-08-17

## 2023-08-17 RX ORDER — POTASSIUM PHOSPHATE, MONOBASIC POTASSIUM PHOSPHATE, DIBASIC 236; 224 MG/ML; MG/ML
30 INJECTION, SOLUTION INTRAVENOUS ONCE
Refills: 0 | Status: COMPLETED | OUTPATIENT
Start: 2023-08-17 | End: 2023-08-17

## 2023-08-17 RX ORDER — DILTIAZEM HCL 120 MG
60 CAPSULE, EXT RELEASE 24 HR ORAL EVERY 6 HOURS
Refills: 0 | Status: DISCONTINUED | OUTPATIENT
Start: 2023-08-17 | End: 2023-08-20

## 2023-08-17 RX ADMIN — APIXABAN 2.5 MILLIGRAM(S): 2.5 TABLET, FILM COATED ORAL at 05:13

## 2023-08-17 RX ADMIN — Medication 50 MILLIGRAM(S): at 13:24

## 2023-08-17 RX ADMIN — Medication 60 MILLIGRAM(S): at 11:50

## 2023-08-17 RX ADMIN — SODIUM CHLORIDE 75 MILLILITER(S): 9 INJECTION, SOLUTION INTRAVENOUS at 21:33

## 2023-08-17 RX ADMIN — Medication 60 MILLIGRAM(S): at 23:32

## 2023-08-17 RX ADMIN — Medication 60 MILLIGRAM(S): at 18:08

## 2023-08-17 RX ADMIN — MIRTAZAPINE 7.5 MILLIGRAM(S): 45 TABLET, ORALLY DISINTEGRATING ORAL at 11:50

## 2023-08-17 RX ADMIN — CHLORHEXIDINE GLUCONATE 1 APPLICATION(S): 213 SOLUTION TOPICAL at 06:07

## 2023-08-17 RX ADMIN — SODIUM CHLORIDE 4 MILLILITER(S): 9 INJECTION INTRAMUSCULAR; INTRAVENOUS; SUBCUTANEOUS at 19:51

## 2023-08-17 RX ADMIN — MUPIROCIN 1 APPLICATION(S): 20 OINTMENT TOPICAL at 05:14

## 2023-08-17 RX ADMIN — Medication 50 MILLIGRAM(S): at 05:13

## 2023-08-17 RX ADMIN — SODIUM CHLORIDE 4 MILLILITER(S): 9 INJECTION INTRAMUSCULAR; INTRAVENOUS; SUBCUTANEOUS at 07:52

## 2023-08-17 RX ADMIN — Medication 30 MILLIGRAM(S): at 05:13

## 2023-08-17 RX ADMIN — POTASSIUM PHOSPHATE, MONOBASIC POTASSIUM PHOSPHATE, DIBASIC 83.33 MILLIMOLE(S): 236; 224 INJECTION, SOLUTION INTRAVENOUS at 18:28

## 2023-08-17 RX ADMIN — Medication 1: at 16:58

## 2023-08-17 RX ADMIN — MUPIROCIN 1 APPLICATION(S): 20 OINTMENT TOPICAL at 18:28

## 2023-08-17 RX ADMIN — ATORVASTATIN CALCIUM 40 MILLIGRAM(S): 80 TABLET, FILM COATED ORAL at 21:31

## 2023-08-17 RX ADMIN — PIPERACILLIN AND TAZOBACTAM 25 GRAM(S): 4; .5 INJECTION, POWDER, LYOPHILIZED, FOR SOLUTION INTRAVENOUS at 21:31

## 2023-08-17 RX ADMIN — Medication 40 MILLIEQUIVALENT(S): at 13:24

## 2023-08-17 RX ADMIN — PANTOPRAZOLE SODIUM 40 MILLIGRAM(S): 20 TABLET, DELAYED RELEASE ORAL at 11:50

## 2023-08-17 RX ADMIN — Medication 1: at 08:00

## 2023-08-17 RX ADMIN — Medication 1: at 21:31

## 2023-08-17 RX ADMIN — PIPERACILLIN AND TAZOBACTAM 25 GRAM(S): 4; .5 INJECTION, POWDER, LYOPHILIZED, FOR SOLUTION INTRAVENOUS at 13:23

## 2023-08-17 RX ADMIN — Medication 2: at 11:50

## 2023-08-17 RX ADMIN — APIXABAN 2.5 MILLIGRAM(S): 2.5 TABLET, FILM COATED ORAL at 18:08

## 2023-08-17 RX ADMIN — PIPERACILLIN AND TAZOBACTAM 25 GRAM(S): 4; .5 INJECTION, POWDER, LYOPHILIZED, FOR SOLUTION INTRAVENOUS at 05:13

## 2023-08-17 RX ADMIN — Medication 50 MILLIGRAM(S): at 23:32

## 2023-08-17 RX ADMIN — Medication 100 MILLIEQUIVALENT(S): at 23:32

## 2023-08-17 NOTE — CONSULT NOTE ADULT - ASSESSMENT
74 y/o M w/ PMH TBI, A-fib on Eliquis, DM2, HTN, HLD presents with sepsis pneumonia and LATOYA.      - 72 y/o M w/ PMH TBI, A-fib on Eliquis, DM2, HTN, HLD presents with sepsis pneumonia and LATOYA.      -Potassium Phosphate IVPB to replete  -Continue to replete K as necessary  -Hypotonic IVF   -Hypokalemia likely due to post ATN diuresis in combination with decreased PO intake/poor nutrition  -LATOYA improving to 1.3. Baseline .6-.8  -Monitor Mg, Phos  -F/u urine studies.  -Daily BMP  -Continue home meds as allowed due to hypotension  -All other management per primary team 72 y/o M w/ PMH TBI, A-fib on Eliquis, DM2, HTN, HLD presents with sepsis pneumonia and LATOYA.    LATOYA on CKD  Hypernatremia  Hypophosphatemia  Hypokalemia  HTN    -Potassium Phosphate IVPB to replete  -Continue to replete K as necessary  -Hypotonic IVF   -Hypokalemia likely due to post ATN diuresis in combination with decreased PO intake/poor nutrition  -LATOYA improving to 1.3. Baseline .6-.8  -Monitor Mg, Phos  -F/u urine studies.  -Daily BMP  -Continue home meds as allowed due to hypotension  -All other management per primary team    8/17/-d/w family

## 2023-08-17 NOTE — CASE MANAGEMENT PROGRESS NOTE - NSCMPROGRESSNOTE_GEN_ALL_CORE
Patient's potassium remains low, 3.0 today. Plan for transition home cancelled. All parties notified to the cancellation is Wife Marj, Withney from [facility], [facility] and [facility]. Transportation change for 4pm tomorrow if patient is stable for transition.

## 2023-08-17 NOTE — PROGRESS NOTE ADULT - SUBJECTIVE AND OBJECTIVE BOX
INTERVAL HPI/OVERNIGHT EVENTS: Patient seen and examined at bedside. Patient nonverbal at baseline. Unable to obtain comprehensive ROS and HPI 2/2 mental status.    MEDICATIONS  (STANDING):  apixaban 2.5 milliGRAM(s) Oral every 12 hours  atorvastatin 40 milliGRAM(s) Oral at bedtime  chlorhexidine 2% Cloths 1 Application(s) Topical every 24 hours  dextrose 5%. 1000 milliLiter(s) (100 mL/Hr) IV Continuous <Continuous>  dextrose 5%. 1000 milliLiter(s) (50 mL/Hr) IV Continuous <Continuous>  dextrose 50% Injectable 25 Gram(s) IV Push once  dextrose 50% Injectable 12.5 Gram(s) IV Push once  dextrose 50% Injectable 25 Gram(s) IV Push once  diltiazem    Tablet 60 milliGRAM(s) Oral every 6 hours  glucagon  Injectable 1 milliGRAM(s) IntraMuscular once  insulin lispro (ADMELOG) corrective regimen sliding scale   SubCutaneous three times a day before meals  insulin lispro (ADMELOG) corrective regimen sliding scale   SubCutaneous at bedtime  mirtazapine 7.5 milliGRAM(s) Oral daily  mupirocin 2% Nasal 1 Application(s) Both Nostrils every 12 hours  pantoprazole  Injectable 40 milliGRAM(s) IV Push daily  piperacillin/tazobactam IVPB.. 3.375 Gram(s) IV Intermittent every 8 hours  potassium phosphate IVPB 30 milliMole(s) IV Intermittent once  sodium chloride 0.45%. 1000 milliLiter(s) (75 mL/Hr) IV Continuous <Continuous>  sodium chloride 3%  Inhalation 4 milliLiter(s) Inhalation every 12 hours    MEDICATIONS  (PRN):  dextrose Oral Gel 15 Gram(s) Oral once PRN Blood Glucose LESS THAN 70 milliGRAM(s)/deciliter  sodium chloride 0.9% lock flush 10 milliLiter(s) IV Push every 1 hour PRN Pre/post blood products, medications, blood draw, and to maintain line patency      Allergies    No Known Allergies    Intolerances    Vital Signs Last 24 Hrs  T(C): 36.7 (17 Aug 2023 11:14), Max: 36.7 (17 Aug 2023 11:14)  T(F): 98.1 (17 Aug 2023 11:14), Max: 98.1 (17 Aug 2023 11:14)  HR: 68 (17 Aug 2023 11:40) (67 - 79)  BP: 179/99 (17 Aug 2023 11:40) (132/73 - 179/99)  BP(mean): --  RR: 18 (17 Aug 2023 11:14) (17 - 18)  SpO2: 94% (17 Aug 2023 11:14) (94% - 96%)    Parameters below as of 17 Aug 2023 11:14  Patient On (Oxygen Delivery Method): room air        08-16 @ 07:01  -  08-17 @ 07:00  --------------------------------------------------------  IN: 650 mL / OUT: 1750 mL / NET: -1100 mL    08-17 @ 07:01  -  08-17 @ 16:42  --------------------------------------------------------  IN: 0 mL / OUT: 900 mL / NET: -900 mL      Physical Exam:  General: laying in bed, frail, NAD  Neurology: A&Ox3, nonfocal, MCKEON x 4  Respiratory: dec breath sounds B/L bases  CV: RRR, S1S2  Abdominal: Soft, NT, ND +BS  Extremities: +pedal edema B/L LE, + peripheral pulses      LABS:                        9.5    8.69  )-----------( 210      ( 17 Aug 2023 08:16 )             29.2     08-17    146<H>  |  110<H>  |  28<H>  ----------------------------<  189<H>  3.0<L>   |  28  |  1.30    Ca    8.7      17 Aug 2023 08:16  Phos  2.0     08-17  Mg     2.0     08-17    TPro  6.1  /  Alb  3.1<L>  /  TBili  0.7  /  DBili  x   /  AST  11<L>  /  ALT  19  /  AlkPhos  64  08-17      Urinalysis Basic - ( 17 Aug 2023 08:16 )    Color: x / Appearance: x / SG: x / pH: x  Gluc: 189 mg/dL / Ketone: x  / Bili: x / Urobili: x   Blood: x / Protein: x / Nitrite: x   Leuk Esterase: x / RBC: x / WBC x   Sq Epi: x / Non Sq Epi: x / Bacteria: x        RADIOLOGY & ADDITIONAL TESTS:

## 2023-08-17 NOTE — PROGRESS NOTE ADULT - ASSESSMENT
73M with PMHx DM2, HTN, HLD A-fib (on Eliquis), TBI (s/p SDH with EVAC on 9/2021), and traumatic subdural hemorrhage in 10/2022, BIBEMS with hypoxia, nausea and vomiting. Admitted to ICU for sepsis pneumonia, bin, and metabolic acidosis. Cardiologist: Dr. Mirta Goldsmith    A fib, HTN, HLD  - EKG showed Afib, no acute changes on EKG compared to previous.  - No clear evidence of acute ischemia, trops negative x 2.   - Continue Lipitor     - Pt currently in AFib, was initially tachy, now rate controlled on tele   - Resumed Eliquis @ 2.5 mg. Discussed with primary team regarding full dose AC.  - In the past when AC was held due to subdural hemorrhage, patient developed DVT.  - Resumed Cardizem, up titrate to 60 QQ6H    - BP elevated 130-170s   - Patient initially required pressors during this admission for hypotension. Patient was weaned off of pressors  - BP somewhat elevated, would resume home ARB when creatinine is back to baseline.     - Previous TTE on 6/11/23 showed LVEF 60-65% with trace mitral regurgitation.  - No meaningful evidence of volume overload.    - Monitor and replete lytes, keep K>4, Mg>2.  - Will continue to follow.    Erwin Edmondson, MS FNP, AGAP  Nurse Practitioner- Cardiology   Call teams (preferred)  Spectra #[phone] /[phone]     73M with PMH DM2, HTN, HLD A-fib (on Eliquis), TBI (s/p SDH with EVAC on 9/2021), and traumatic subdural hemorrhage in 10/2022, BIBEMS with hypoxia, nausea and vomiting. Admitted to ICU for sepsis pneumonia, bin, and metabolic acidosis. Cardiologist: Dr. Mirta Goldsmith    A fib, HTN, HLD  - EKG showed Afib, no acute changes on EKG compared to previous.  - No clear evidence of acute ischemia, trops negative x 2.   - Continue Lipitor     - Pt currently in AFib, was initially tachy, now rate controlled on tele   - Resumed Eliquis @ 2.5 mg. Discussed with primary team regarding full dose AC.  - In the past when AC was held due to subdural hemorrhage, patient developed DVT.  - Resumed Cardizem, up titrate to 60 QQ6H    - BP elevated 130-170s   - Patient initially required pressors during this admission for hypotension. Patient was weaned off of pressors  - BP somewhat elevated, would resume home ARB when creatinine is back to baseline.     - Previous TTE on 6/11/23 showed LVEF 60-65% with trace mitral regurgitation.  - No meaningful evidence of volume overload.    - Monitor and replete lytes, keep K>4, Mg>2.  - Will continue to follow.    Erwin Edmondson, MS FNP, [facility]  Nurse Practitioner- Cardiology   Call teams (preferred)  Spectra #[phone] /[phone]     73M with PMH DM2, HTN, HLD A-fib (on Eliquis), TBI (s/p SDH with EVAC on 9/2021), and traumatic subdural hemorrhage in 10/2022, BIBEMS with hypoxia, nausea and vomiting. Admitted to ICU for sepsis pneumonia, bin, and metabolic acidosis. Cardiologist: Dr. Mirta Goldsmith    A fib, HTN, HLD  - EKG showed Afib, no acute changes on EKG compared to previous.  - No clear evidence of acute ischemia, trops negative x 2.   - Continue Lipitor     - Pt currently in AFib, was initially tachy, now rate controlled on tele, he is now off O2, can d/c tele   - Resumed Eliquis @ 2.5 mg. Discussed with primary team regarding full dose AC.  - In the past when AC was held due to subdural hemorrhage, patient developed DVT.  - Resumed Cardizem, up titrate to 60 QQ6H    - BP elevated 130-170s   - Patient initially required pressors during this admission for hypotension. Patient was weaned off of pressors  - BP somewhat elevated, would resume home ARB when creatinine is back to baseline.     - Previous TTE on 6/11/23 showed LVEF 60-65% with trace mitral regurgitation.  - No meaningful evidence of volume overload.    - Monitor and replete lytes, keep K>4, Mg>2.  - Will continue to follow.    Erwin Edmondson MS FNP, [facility]  Nurse Practitioner- Cardiology   Call teams (preferred)  Spectra #[phone] /[phone]     73M with PMH DM2, HTN, HLD A-fib (on Eliquis), TBI (s/p SDH with EVAC on 9/2021), and traumatic subdural hemorrhage in 10/2022, BIBEMS with hypoxia, nausea and vomiting. Admitted to ICU for sepsis pneumonia, bin, and metabolic acidosis. Cardiologist: Dr. Mirta Goldsmith    A fib, HTN, HLD  - EKG showed Afib, no acute changes on EKG compared to previous.  - No clear evidence of acute ischemia, trops negative x 2.   - Continue Lipitor     - Pt currently in AFib, was initially tachy, now rate controlled on tele, he is now off O2, can d/c tele   - Resumed Eliquis @ 2.5 mg. Discussed with primary team regarding full dose AC.  - In the past when AC was held due to subdural hemorrhage, patient developed DVT.  - Resumed Cardizem, up titrate to 60 QQ6H    - BP elevated 130-170s   - Patient initially required pressors during this admission for hypotension. Patient was weaned off of pressors  - he is still on cortef. would wean     - Previous TTE on 6/11/23 showed LVEF 60-65% with trace mitral regurgitation.  - No meaningful evidence of volume overload.    - Monitor and replete lytes, keep K>4, Mg>2.  - Will continue to follow.    Erwin Edmondson, MS FNP, [facility]  Nurse Practitioner- Cardiology   Call teams (preferred)  Spectra #[phone] /[phone]

## 2023-08-17 NOTE — PROGRESS NOTE ADULT - TIME BILLING
as above
I personally conducted a physical examination of the patient. I personally gathered the patient's history. I edited the above listed findings which were prepared by the listed resident physician.

## 2023-08-17 NOTE — PROGRESS NOTE ADULT - SUBJECTIVE AND OBJECTIVE BOX
[facility] -- Alexey Briceno, Agus Sepulveda Savella, Harjit Andujar: Office # [phone]    Follow Up:  NADIR Wallace    Subjective/Observations: Patient seen and examined. Patient awake, alert, resting in bed. No complaints of chest pain, dyspnea, Tolerating O2 via nasal cannula. + cough. Minimal conversation    REVIEW OF SYSTEMS: All other review of systems are negative unless indicated above    PAST MEDICAL & SURGICAL HISTORY:  TBI (traumatic brain injury)      HTN (hypertension)      Atrial fibrillation      Peripheral neuropathy      Major depression      HLD (hyperlipidemia)      CAD (coronary artery disease)      BPH (benign prostatic hyperplasia)      Pneumonia due to COVID-19 virus  June 2022      Hemorrhage in the brain      H/O craniotomy      MEDICATIONS  (STANDING):  apixaban 2.5 milliGRAM(s) Oral every 12 hours  atorvastatin 40 milliGRAM(s) Oral at bedtime  chlorhexidine 2% Cloths 1 Application(s) Topical every 24 hours  dextrose 5%. 1000 milliLiter(s) (50 mL/Hr) IV Continuous <Continuous>  dextrose 5%. 1000 milliLiter(s) (100 mL/Hr) IV Continuous <Continuous>  dextrose 50% Injectable 25 Gram(s) IV Push once  dextrose 50% Injectable 12.5 Gram(s) IV Push once  dextrose 50% Injectable 25 Gram(s) IV Push once  diltiazem    Tablet 30 milliGRAM(s) Oral every 6 hours  glucagon  Injectable 1 milliGRAM(s) IntraMuscular once  hydrocortisone sodium succinate Injectable 50 milliGRAM(s) IV Push every 8 hours  insulin lispro (ADMELOG) corrective regimen sliding scale   SubCutaneous three times a day before meals  insulin lispro (ADMELOG) corrective regimen sliding scale   SubCutaneous at bedtime  mirtazapine 7.5 milliGRAM(s) Oral daily  mupirocin 2% Nasal 1 Application(s) Both Nostrils every 12 hours  pantoprazole  Injectable 40 milliGRAM(s) IV Push daily  piperacillin/tazobactam IVPB.. 3.375 Gram(s) IV Intermittent every 8 hours  sodium chloride 3%  Inhalation 4 milliLiter(s) Inhalation every 12 hours    MEDICATIONS  (PRN):  dextrose Oral Gel 15 Gram(s) Oral once PRN Blood Glucose LESS THAN 70 milliGRAM(s)/deciliter  sodium chloride 0.9% lock flush 10 milliLiter(s) IV Push every 1 hour PRN Pre/post blood products, medications, blood draw, and to maintain line patency    Allergies    No Known Allergies    Intolerances      Vital Signs Last 24 Hrs  T(C): 36.5 (17 Aug 2023 04:50), Max: 36.5 (17 Aug 2023 04:50)  T(F): 97.7 (17 Aug 2023 04:50), Max: 97.7 (17 Aug 2023 04:50)  HR: 69 (17 Aug 2023 04:50) (69 - 85)  BP: 172/98 (17 Aug 2023 04:50) (132/73 - 172/98)  BP(mean): --  RR: 17 (17 Aug 2023 04:50) (17 - 18)  SpO2: 94% (17 Aug 2023 04:50) (94% - 100%)    Parameters below as of 17 Aug 2023 04:50  Patient On (Oxygen Delivery Method): room air      I&O's Summary    16 Aug 2023 07:01  -  17 Aug 2023 07:00  --------------------------------------------------------  IN: 650 mL / OUT: 1750 mL / NET: -1100 mL          TELE: A fib  PHYSICAL EXAM:  Constitutional: NAD, awake and alert  HEENT: Moist Mucous Membranes, Anicteric  Pulmonary: Non-labored, breath sounds are clear bilaterally, No wheezing, rales or rhonchi  Cardiovascular: Regular, S1 and S2, No murmurs, No rubs, gallops or clicks  Gastrointestinal:  soft, nontender, nondistended   Lymph: trace peripheral edema. No lymphadenopathy.   Skin: No visible rashes or ulcers.  Neuro: Minimally verbal, unable to follow commands   Psych:  Mood & affect appropriate      LABS: All Labs Reviewed:                        10.0   9.01  )-----------( 208      ( 16 Aug 2023 06:07 )             30.0                         9.6    10.44 )-----------( 193      ( 15 Aug 2023 09:00 )             28.9                         8.5    9.95  )-----------( 176      ( 14 Aug 2023 08:40 )             26.1     16 Aug 2023 17:38    145    |  110    |  34     ----------------------------<  225    2.9     |  29     |  1.50   16 Aug 2023 08:59    142    |  109    |  37     ----------------------------<  218    2.6     |  27     |  1.60   16 Aug 2023 06:07    145    |  109    |  39     ----------------------------<  199    2.4     |  28     |  1.60     Ca    8.3        16 Aug 2023 17:38  Ca    8.3        16 Aug 2023 08:59  Ca    8.2        16 Aug 2023 06:07  Phos  3.0       16 Aug 2023 06:07  Phos  4.2       15 Aug 2023 09:00  Mg     1.9       16 Aug 2023 06:07  Mg     1.9       15 Aug 2023 09:00    TPro  5.8    /  Alb  3.0    /  TBili  0.6    /  DBili  x      /  AST  10     /  ALT  16     /  AlkPhos  63     16 Aug 2023 06:07  TPro  5.6    /  Alb  3.1    /  TBili  0.9    /  DBili  x      /  AST  13     /  ALT  13     /  AlkPhos  54     15 Aug 2023 09:00   LIVER FUNCTIONS - ( 16 Aug 2023 06:07 )  Alb: 3.0 g/dL / Pro: 5.8 g/dL / ALK PHOS: 63 U/L / ALT: 16 U/L / AST: 10 U/L / GGT: x           Troponin I, High Sensitivity Result: 76.9 ng/L (08-13-23 @ 09:33)  Troponin I, High Sensitivity Result: 6.7 ng/L (08-12-23 @ 21:50)    12 Lead ECG:   Ventricular Rate 98 BPM    QRS Duration 76 ms    Q-T Interval 342 ms    QTC Calculation(Bazett) 436 ms    R Axis 213 degrees    T Axis 19 degrees    Diagnosis Line Atrial fibrillation  Right ventricular hypertrophy  Inferior infarct (cited on or before 22-MAY-2022)  Anterolateral infarct (cited on or before 22-MAY-2022)  Abnormal ECG  When compared with ECG of 10-IVAN-2023 20:29,  Questionable change in initial forces of Septal leads  Confirmed by Lexi Andujar ([id number]) on 8/13/2023 7:25:42 AM  (08-12-23 @ 20:39)      TRANSTHORACIC ECHOCARDIOGRAM REPORT  ________________________________________________________________________________                                      _______       Pt. Name:       MELBA PARMAR Study Date:    8/14/2023  MRN:            [id number]       YOB: 1950  Accession #:    [id number]      Age:           73 years  Account#:       [id number]     Gender:        M  Heart Rate:                    Height:        69.00 in (175.26 cm)  Rhythm:                        Weight:  134.00 lb (60.78 kg)  Blood Pressure: 120/74 mmHg    BSA/BMI:       1.74 m² / 19.79 kg/m²  ________________________________________________________________________________________  Referring Physician:    [id number] Ranulfo Boggs  Interpreting Physician: Lexi Seals  Primary Sonographer:    Celia Whitmore [facility]    CPT:               ECHO TTE WO CON COMP W DOPP - 25230.m  Indication(s):     Shock, unspecified - R57.9  Procedure:         Transthoracic echocardiogram with 2-D, M-mode and complete                     spectral and color flow Doppler.  Ordering Location: [facility]  Study Information: Image quality for this study is technically difficult.    _______________________________________________________________________________________     CONCLUSIONS:      1. Technically difficult image quality.   2. Left ventricular systolic function is normal with an ejection fraction visually estimated at 60 to 65 %.   3. The right ventricle is not well visualized.   4. The left atrium is mildly dilated in size.   5. Aortic valve was not well visualized.   6. Trace mitral regurgitation.   7. Trace tricuspid regurgitation.    ________________________________________________________________________________________  FINDINGS:     Left Ventricle:  Left ventricular systolic function is normal with an ejection fraction visually estimated at 60 to 65%.     Right Ventricle:  The right ventricle is not well visualized. Normal wall thickness.     Left Atrium:  The left atrium is mildly dilated in size.     Right Atrium:  The right atrium is normal in size.     Aortic Valve:  The aortic valve was not well visualized.     Mitral Valve:  There is trace mitral regurgitation.     Tricuspid Valve:  There is trace tricuspid regurgitation. Estimated pulmonary artery systolic pressure is 29 mmHg.     Pulmonic Valve:  The pulmonic valve was not well visualized.     Pericardium:  No pericardial effusion seen.     Systemic Veins:  The inferior vena cava is normal in size (normal <2.1cm) with normal inspiratory collapse (normal >50%) consistent with normal right atrial pressure (~3, range 0-5mmHg).  ____________________________________________________________________  Quantitative Data:  Left Ventricle Measurements: (Indexed to BSA)     IVSd (2D):   1.2 cm  LVPWd (2D):  1.2 cm  LVIDd (2D):  4.1 cm  LVIDs (2D):  2.6 cm  LV Mass:     167 g  96.0 g/m²  Visualized LV EF%: 60 to 65%     MV E Vmax:    0.73 m/s  e' lateral:   12.00 cm/s  e' medial:    8.49 cm/s  E/e' lateral: 6.12  E/e' medial:  8.65  E/e' Average: 7.16  MV DT:        120 msec       Left Atrium Measurements: (Indexed to BSA)  LA Diam 2D: 4.10 cm       LVOT / RVOT/ Qp/Qs Data: (Indexed to BSA)  LVOT Diameter: 2.00 cm    Mitral Valve Measurements:     MV E Vmax: 0.7 m/s       Tricuspid Valve Measurements:     TR Vmax:          2.5 m/s  TR Peak Gradient: 25.6 mmHg  RA Pressure:      3 mmHg  PASP:             29 mmHg    ________________________________________________________________________________________  Electronically signed on8/15/2023 at 12:43:28 PM by Lexi Seals         *** Final ***   [facility] -- Alexey Briceno, Agus Sepulveda Savella, Harjit Andujar: Office # [phone]    Follow Up:  NADIR Wallace    Subjective/Observations: Patient seen and examined. Patient awake, alert, resting in bed. No complaints of chest pain, dyspnea, Tolerating O2 via nasal cannula. + cough. Minimal conversation    REVIEW OF SYSTEMS: All other review of systems are negative unless indicated above    PAST MEDICAL & SURGICAL HISTORY:  TBI (traumatic brain injury)      HTN (hypertension)      Atrial fibrillation      Peripheral neuropathy      Major depression      HLD (hyperlipidemia)      CAD (coronary artery disease)      BPH (benign prostatic hyperplasia)      Pneumonia due to COVID-19 virus  June 2022      Hemorrhage in the brain      H/O craniotomy      MEDICATIONS  (STANDING):  apixaban 2.5 milliGRAM(s) Oral every 12 hours  atorvastatin 40 milliGRAM(s) Oral at bedtime  chlorhexidine 2% Cloths 1 Application(s) Topical every 24 hours  dextrose 5%. 1000 milliLiter(s) (50 mL/Hr) IV Continuous <Continuous>  dextrose 5%. 1000 milliLiter(s) (100 mL/Hr) IV Continuous <Continuous>  dextrose 50% Injectable 25 Gram(s) IV Push once  dextrose 50% Injectable 12.5 Gram(s) IV Push once  dextrose 50% Injectable 25 Gram(s) IV Push once  diltiazem    Tablet 30 milliGRAM(s) Oral every 6 hours  glucagon  Injectable 1 milliGRAM(s) IntraMuscular once  hydrocortisone sodium succinate Injectable 50 milliGRAM(s) IV Push every 8 hours  insulin lispro (ADMELOG) corrective regimen sliding scale   SubCutaneous three times a day before meals  insulin lispro (ADMELOG) corrective regimen sliding scale   SubCutaneous at bedtime  mirtazapine 7.5 milliGRAM(s) Oral daily  mupirocin 2% Nasal 1 Application(s) Both Nostrils every 12 hours  pantoprazole  Injectable 40 milliGRAM(s) IV Push daily  piperacillin/tazobactam IVPB.. 3.375 Gram(s) IV Intermittent every 8 hours  sodium chloride 3%  Inhalation 4 milliLiter(s) Inhalation every 12 hours    MEDICATIONS  (PRN):  dextrose Oral Gel 15 Gram(s) Oral once PRN Blood Glucose LESS THAN 70 milliGRAM(s)/deciliter  sodium chloride 0.9% lock flush 10 milliLiter(s) IV Push every 1 hour PRN Pre/post blood products, medications, blood draw, and to maintain line patency    Allergies    No Known Allergies    Intolerances      Vital Signs Last 24 Hrs  T(C): 36.5 (17 Aug 2023 04:50), Max: 36.5 (17 Aug 2023 04:50)  T(F): 97.7 (17 Aug 2023 04:50), Max: 97.7 (17 Aug 2023 04:50)  HR: 69 (17 Aug 2023 04:50) (69 - 85)  BP: 172/98 (17 Aug 2023 04:50) (132/73 - 172/98)  BP(mean): --  RR: 17 (17 Aug 2023 04:50) (17 - 18)  SpO2: 94% (17 Aug 2023 04:50) (94% - 100%)    Parameters below as of 17 Aug 2023 04:50  Patient On (Oxygen Delivery Method): room air      I&O's Summary    16 Aug 2023 07:01  -  17 Aug 2023 07:00  --------------------------------------------------------  IN: 650 mL / OUT: 1750 mL / NET: -1100 mL          TELE: A fib 60-70s   PHYSICAL EXAM:  Constitutional: NAD, awake and alert  HEENT: Moist Mucous Membranes, Anicteric  Pulmonary: Non-labored, breath sounds are clear bilaterally, No wheezing, rales or rhonchi  Cardiovascular: Regular, S1 and S2, No murmurs, No rubs, gallops or clicks  Gastrointestinal:  soft, nontender, nondistended   Lymph: trace peripheral edema. No lymphadenopathy.   Skin: No visible rashes or ulcers.  Neuro: Minimally verbal, unable to follow commands   Psych:  Mood & affect appropriate      LABS: All Labs Reviewed:                        10.0   9.01  )-----------( 208      ( 16 Aug 2023 06:07 )             30.0                         9.6    10.44 )-----------( 193      ( 15 Aug 2023 09:00 )             28.9                         8.5    9.95  )-----------( 176      ( 14 Aug 2023 08:40 )             26.1     16 Aug 2023 17:38    145    |  110    |  34     ----------------------------<  225    2.9     |  29     |  1.50   16 Aug 2023 08:59    142    |  109    |  37     ----------------------------<  218    2.6     |  27     |  1.60   16 Aug 2023 06:07    145    |  109    |  39     ----------------------------<  199    2.4     |  28     |  1.60     Ca    8.3        16 Aug 2023 17:38  Ca    8.3        16 Aug 2023 08:59  Ca    8.2        16 Aug 2023 06:07  Phos  3.0       16 Aug 2023 06:07  Phos  4.2       15 Aug 2023 09:00  Mg     1.9       16 Aug 2023 06:07  Mg     1.9       15 Aug 2023 09:00    TPro  5.8    /  Alb  3.0    /  TBili  0.6    /  DBili  x      /  AST  10     /  ALT  16     /  AlkPhos  63     16 Aug 2023 06:07  TPro  5.6    /  Alb  3.1    /  TBili  0.9    /  DBili  x      /  AST  13     /  ALT  13     /  AlkPhos  54     15 Aug 2023 09:00   LIVER FUNCTIONS - ( 16 Aug 2023 06:07 )  Alb: 3.0 g/dL / Pro: 5.8 g/dL / ALK PHOS: 63 U/L / ALT: 16 U/L / AST: 10 U/L / GGT: x           Troponin I, High Sensitivity Result: 76.9 ng/L (08-13-23 @ 09:33)  Troponin I, High Sensitivity Result: 6.7 ng/L (08-12-23 @ 21:50)    12 Lead ECG:   Ventricular Rate 98 BPM    QRS Duration 76 ms    Q-T Interval 342 ms    QTC Calculation(Bazett) 436 ms    R Axis 213 degrees    T Axis 19 degrees    Diagnosis Line Atrial fibrillation  Right ventricular hypertrophy  Inferior infarct (cited on or before 22-MAY-2022)  Anterolateral infarct (cited on or before 22-MAY-2022)  Abnormal ECG  When compared with ECG of 10-IVAN-2023 20:29,  Questionable change in initial forces of Septal leads  Confirmed by Lexi Andujar ([id number]) on 8/13/2023 7:25:42 AM  (08-12-23 @ 20:39)      TRANSTHORACIC ECHOCARDIOGRAM REPORT  ________________________________________________________________________________                                      _______       Pt. Name:       MELBA PARMAR Study Date:    8/14/2023  MRN:            [id number]       YOB: 1950  Accession #:    [id number]      Age:           73 years  Account#:       [id number]     Gender:        M  Heart Rate:                    Height:        69.00 in (175.26 cm)  Rhythm:                        Weight:  134.00 lb (60.78 kg)  Blood Pressure: 120/74 mmHg    BSA/BMI:       1.74 m² / 19.79 kg/m²  ________________________________________________________________________________________  Referring Physician:    [id number] Ranulfo Boggs  Interpreting Physician: Lexi Seals  Primary Sonographer:    Celia Whitmore [facility]    CPT:               ECHO TTE WO CON COMP W DOPP - 29288.m  Indication(s):     Shock, unspecified - R57.9  Procedure:         Transthoracic echocardiogram with 2-D, M-mode and complete                     spectral and color flow Doppler.  Ordering Location: [facility]  Study Information: Image quality for this study is technically difficult.    _______________________________________________________________________________________     CONCLUSIONS:      1. Technically difficult image quality.   2. Left ventricular systolic function is normal with an ejection fraction visually estimated at 60 to 65 %.   3. The right ventricle is not well visualized.   4. The left atrium is mildly dilated in size.   5. Aortic valve was not well visualized.   6. Trace mitral regurgitation.   7. Trace tricuspid regurgitation.    ________________________________________________________________________________________  FINDINGS:     Left Ventricle:  Left ventricular systolic function is normal with an ejection fraction visually estimated at 60 to 65%.     Right Ventricle:  The right ventricle is not well visualized. Normal wall thickness.     Left Atrium:  The left atrium is mildly dilated in size.     Right Atrium:  The right atrium is normal in size.     Aortic Valve:  The aortic valve was not well visualized.     Mitral Valve:  There is trace mitral regurgitation.     Tricuspid Valve:  There is trace tricuspid regurgitation. Estimated pulmonary artery systolic pressure is 29 mmHg.     Pulmonic Valve:  The pulmonic valve was not well visualized.     Pericardium:  No pericardial effusion seen.     Systemic Veins:  The inferior vena cava is normal in size (normal <2.1cm) with normal inspiratory collapse (normal >50%) consistent with normal right atrial pressure (~3, range 0-5mmHg).  ____________________________________________________________________  Quantitative Data:  Left Ventricle Measurements: (Indexed to BSA)     IVSd (2D):   1.2 cm  LVPWd (2D):  1.2 cm  LVIDd (2D):  4.1 cm  LVIDs (2D):  2.6 cm  LV Mass:     167 g  96.0 g/m²  Visualized LV EF%: 60 to 65%     MV E Vmax:    0.73 m/s  e' lateral:   12.00 cm/s  e' medial:    8.49 cm/s  E/e' lateral: 6.12  E/e' medial:  8.65  E/e' Average: 7.16  MV DT:        120 msec       Left Atrium Measurements: (Indexed to BSA)  LA Diam 2D: 4.10 cm       LVOT / RVOT/ Qp/Qs Data: (Indexed to BSA)  LVOT Diameter: 2.00 cm    Mitral Valve Measurements:     MV E Vmax: 0.7 m/s       Tricuspid Valve Measurements:     TR Vmax:          2.5 m/s  TR Peak Gradient: 25.6 mmHg  RA Pressure:      3 mmHg  PASP:             29 mmHg    ________________________________________________________________________________________  Electronically signed on8/15/2023 at 12:43:28 PM by Lexi Seals         *** Final ***   [facility] -- Alexey Briceno, Agus Sepulveda Savella, Harjit Andujar: Office # [phone]    Follow Up:  A NADIR genao    Subjective/Observations: Patient seen and examined. Patient awake, alert, resting in bed. No complaints of chest pain, dyspnea, Tolerating room air.  + cough. Minimal conversation    REVIEW OF SYSTEMS: All other review of systems are negative unless indicated above    PAST MEDICAL & SURGICAL HISTORY:  TBI (traumatic brain injury)      HTN (hypertension)      Atrial fibrillation      Peripheral neuropathy      Major depression      HLD (hyperlipidemia)      CAD (coronary artery disease)      BPH (benign prostatic hyperplasia)      Pneumonia due to COVID-19 virus  June 2022      Hemorrhage in the brain      H/O craniotomy      MEDICATIONS  (STANDING):  apixaban 2.5 milliGRAM(s) Oral every 12 hours  atorvastatin 40 milliGRAM(s) Oral at bedtime  chlorhexidine 2% Cloths 1 Application(s) Topical every 24 hours  dextrose 5%. 1000 milliLiter(s) (50 mL/Hr) IV Continuous <Continuous>  dextrose 5%. 1000 milliLiter(s) (100 mL/Hr) IV Continuous <Continuous>  dextrose 50% Injectable 25 Gram(s) IV Push once  dextrose 50% Injectable 12.5 Gram(s) IV Push once  dextrose 50% Injectable 25 Gram(s) IV Push once  diltiazem    Tablet 30 milliGRAM(s) Oral every 6 hours  glucagon  Injectable 1 milliGRAM(s) IntraMuscular once  hydrocortisone sodium succinate Injectable 50 milliGRAM(s) IV Push every 8 hours  insulin lispro (ADMELOG) corrective regimen sliding scale   SubCutaneous three times a day before meals  insulin lispro (ADMELOG) corrective regimen sliding scale   SubCutaneous at bedtime  mirtazapine 7.5 milliGRAM(s) Oral daily  mupirocin 2% Nasal 1 Application(s) Both Nostrils every 12 hours  pantoprazole  Injectable 40 milliGRAM(s) IV Push daily  piperacillin/tazobactam IVPB.. 3.375 Gram(s) IV Intermittent every 8 hours  sodium chloride 3%  Inhalation 4 milliLiter(s) Inhalation every 12 hours    MEDICATIONS  (PRN):  dextrose Oral Gel 15 Gram(s) Oral once PRN Blood Glucose LESS THAN 70 milliGRAM(s)/deciliter  sodium chloride 0.9% lock flush 10 milliLiter(s) IV Push every 1 hour PRN Pre/post blood products, medications, blood draw, and to maintain line patency    Allergies    No Known Allergies    Intolerances      Vital Signs Last 24 Hrs  T(C): 36.5 (17 Aug 2023 04:50), Max: 36.5 (17 Aug 2023 04:50)  T(F): 97.7 (17 Aug 2023 04:50), Max: 97.7 (17 Aug 2023 04:50)  HR: 69 (17 Aug 2023 04:50) (69 - 85)  BP: 172/98 (17 Aug 2023 04:50) (132/73 - 172/98)  BP(mean): --  RR: 17 (17 Aug 2023 04:50) (17 - 18)  SpO2: 94% (17 Aug 2023 04:50) (94% - 100%)    Parameters below as of 17 Aug 2023 04:50  Patient On (Oxygen Delivery Method): room air      I&O's Summary    16 Aug 2023 07:01  -  17 Aug 2023 07:00  --------------------------------------------------------  IN: 650 mL / OUT: 1750 mL / NET: -1100 mL          TELE: A fib 60-70s   PHYSICAL EXAM:  Constitutional: NAD, awake and alert  HEENT: Moist Mucous Membranes, Anicteric  Pulmonary: Non-labored, breath sounds are clear bilaterally, No wheezing, rales or rhonchi  Cardiovascular: Regular, S1 and S2, No murmurs, No rubs, gallops or clicks  Gastrointestinal:  soft, nontender, nondistended   Lymph: trace peripheral edema. No lymphadenopathy.   Skin: No visible rashes or ulcers.  Neuro: Minimally verbal, unable to follow commands   Psych:  Mood & affect appropriate      LABS: All Labs Reviewed:                        10.0   9.01  )-----------( 208      ( 16 Aug 2023 06:07 )             30.0                         9.6    10.44 )-----------( 193      ( 15 Aug 2023 09:00 )             28.9                         8.5    9.95  )-----------( 176      ( 14 Aug 2023 08:40 )             26.1     16 Aug 2023 17:38    145    |  110    |  34     ----------------------------<  225    2.9     |  29     |  1.50   16 Aug 2023 08:59    142    |  109    |  37     ----------------------------<  218    2.6     |  27     |  1.60   16 Aug 2023 06:07    145    |  109    |  39     ----------------------------<  199    2.4     |  28     |  1.60     Ca    8.3        16 Aug 2023 17:38  Ca    8.3        16 Aug 2023 08:59  Ca    8.2        16 Aug 2023 06:07  Phos  3.0       16 Aug 2023 06:07  Phos  4.2       15 Aug 2023 09:00  Mg     1.9       16 Aug 2023 06:07  Mg     1.9       15 Aug 2023 09:00    TPro  5.8    /  Alb  3.0    /  TBili  0.6    /  DBili  x      /  AST  10     /  ALT  16     /  AlkPhos  63     16 Aug 2023 06:07  TPro  5.6    /  Alb  3.1    /  TBili  0.9    /  DBili  x      /  AST  13     /  ALT  13     /  AlkPhos  54     15 Aug 2023 09:00   LIVER FUNCTIONS - ( 16 Aug 2023 06:07 )  Alb: 3.0 g/dL / Pro: 5.8 g/dL / ALK PHOS: 63 U/L / ALT: 16 U/L / AST: 10 U/L / GGT: x           Troponin I, High Sensitivity Result: 76.9 ng/L (08-13-23 @ 09:33)  Troponin I, High Sensitivity Result: 6.7 ng/L (08-12-23 @ 21:50)    12 Lead ECG:   Ventricular Rate 98 BPM    QRS Duration 76 ms    Q-T Interval 342 ms    QTC Calculation(Bazett) 436 ms    R Axis 213 degrees    T Axis 19 degrees    Diagnosis Line Atrial fibrillation  Right ventricular hypertrophy  Inferior infarct (cited on or before 22-MAY-2022)  Anterolateral infarct (cited on or before 22-MAY-2022)  Abnormal ECG  When compared with ECG of 10-IVAN-2023 20:29,  Questionable change in initial forces of Septal leads  Confirmed by Lexi Andujar ([id number]) on 8/13/2023 7:25:42 AM  (08-12-23 @ 20:39)      TRANSTHORACIC ECHOCARDIOGRAM REPORT  ________________________________________________________________________________                                      _______       Pt. Name:       MELBA PARMAR Study Date:    8/14/2023  MRN:            [id number]       YOB: 1950  Accession #:    [id number]      Age:           73 years  Account#:       [id number]     Gender:        M  Heart Rate:                    Height:        69.00 in (175.26 cm)  Rhythm:                        Weight:  134.00 lb (60.78 kg)  Blood Pressure: 120/74 mmHg    BSA/BMI:       1.74 m² / 19.79 kg/m²  ________________________________________________________________________________________  Referring Physician:    [id number] Ranulfo Boggs  Interpreting Physician: Lexi Seals  Primary Sonographer:    Celia Whitmore [facility]    CPT:               ECHO TTE WO CON COMP W DOPP - 03864.m  Indication(s):     Shock, unspecified - R57.9  Procedure:         Transthoracic echocardiogram with 2-D, M-mode and complete                     spectral and color flow Doppler.  Ordering Location: [facility]  Study Information: Image quality for this study is technically difficult.    _______________________________________________________________________________________     CONCLUSIONS:      1. Technically difficult image quality.   2. Left ventricular systolic function is normal with an ejection fraction visually estimated at 60 to 65 %.   3. The right ventricle is not well visualized.   4. The left atrium is mildly dilated in size.   5. Aortic valve was not well visualized.   6. Trace mitral regurgitation.   7. Trace tricuspid regurgitation.    ________________________________________________________________________________________  FINDINGS:     Left Ventricle:  Left ventricular systolic function is normal with an ejection fraction visually estimated at 60 to 65%.     Right Ventricle:  The right ventricle is not well visualized. Normal wall thickness.     Left Atrium:  The left atrium is mildly dilated in size.     Right Atrium:  The right atrium is normal in size.     Aortic Valve:  The aortic valve was not well visualized.     Mitral Valve:  There is trace mitral regurgitation.     Tricuspid Valve:  There is trace tricuspid regurgitation. Estimated pulmonary artery systolic pressure is 29 mmHg.     Pulmonic Valve:  The pulmonic valve was not well visualized.     Pericardium:  No pericardial effusion seen.     Systemic Veins:  The inferior vena cava is normal in size (normal <2.1cm) with normal inspiratory collapse (normal >50%) consistent with normal right atrial pressure (~3, range 0-5mmHg).  ____________________________________________________________________  Quantitative Data:  Left Ventricle Measurements: (Indexed to BSA)     IVSd (2D):   1.2 cm  LVPWd (2D):  1.2 cm  LVIDd (2D):  4.1 cm  LVIDs (2D):  2.6 cm  LV Mass:     167 g  96.0 g/m²  Visualized LV EF%: 60 to 65%     MV E Vmax:    0.73 m/s  e' lateral:   12.00 cm/s  e' medial:    8.49 cm/s  E/e' lateral: 6.12  E/e' medial:  8.65  E/e' Average: 7.16  MV DT:        120 msec       Left Atrium Measurements: (Indexed to BSA)  LA Diam 2D: 4.10 cm       LVOT / RVOT/ Qp/Qs Data: (Indexed to BSA)  LVOT Diameter: 2.00 cm    Mitral Valve Measurements:     MV E Vmax: 0.7 m/s       Tricuspid Valve Measurements:     TR Vmax:          2.5 m/s  TR Peak Gradient: 25.6 mmHg  RA Pressure:      3 mmHg  PASP:             29 mmHg    ________________________________________________________________________________________  Electronically signed on8/15/2023 at 12:43:28 PM by Lexi Seals         *** Final ***

## 2023-08-17 NOTE — PROGRESS NOTE ADULT - ASSESSMENT
74 y/o M with PMHx DM2, HTN, HLD A-fib (on Eliquis), TBI (s/p SDH with EVAC on 9/2021), and traumatic subdural hemorrhage in 10/2022, BIBEMS with nausea and vomiting. Admitted to ICU for sepsis 2/2 PNA, LATOYA and metabolic acidosis       Problem/Plan - 1:  ·  Problem: Sepsis with acute hypoxic respiratory failure.   ·  Plan: Likely 2/2 aspiration PNA  - CT Chest: Bilateral pulmonary ground glass opacities   - S/p Vanc,  Cefepime, Azithromycin in ED.   - Continue IV Zosyn   - continue bactroban  5 days bid   - CT A/P - concerning for colitis but no clinical sign /symptom of acute colitis   - Cont supp O2 to maintain O2 sat >92%  - Blood Cx (prelim - NGTD), Urine Cx (final - no growth)  - ID consulted, Dr. Manning, appreciate recs     Problem/Plan - 2:  ·  Problem: LATOYA (acute kidney injury).   ·  Plan: Baseline 0.6-0.8, per chart review  - Cr 1.5 on admission and worsening   - dced Bicarb gtt for metabolic acidosis in ICU - now resolved  - avoid nephrotoxic meds.  - Cr improving  -Nephrology consulted, appreciate recs  - Patient hypokalemic, will aggressively repeat after discussing with Cardio     Problem/Plan - 3:  ·  Problem: Chronic atrial fibrillation.   ·  Plan: Patient with chronic atrial fibrillation  - Tachycardic on admission, progressed to Afib with RVR likely 2/2 dehydration, hypotension, acute illness  - s/p Cardizem IVP 5mg on admission with improved rates  - restarted eliquis (renally dosed) given Hgb improvement and no active signs of bleed.   - restarted diltiazem at lower dose 30 q6hr sec to soft bp- increase to 60mg q6h as BP elevated  -  TTE- Left ventricular systolic function is normal with an ejection fraction visually estimated at 60 to 65 %.  - Cardio following dr kemp.     Problem/Plan - 4:  ·  Problem: Acute colitis.   ·  Plan: CT A/P: Colitis of distal sigmoid colon  - Diarrhea likely 2/2 inflammation vs infectious colitis. Low suspicion for C. diff  - Diet advanced to soft bite sized solid and thin liquids.     Problem/Plan - 5:  ·  Problem: Chronic generalized pain.   ·  Plan: Likely 2/2 deconditioning/immobility   - On home gabapentin - hold.     Problem/Plan - 6:  ·  Problem: Type 2 diabetes mellitus with lactic acidosis.   ·  Plan: Well controlled, on home Metformin 1000mg BID. Not on home insulin  - P/w lactic acidosis in setting of acute illness, likely component of Metformin side effect as well  - A1C: 5.5 in Jun 2023  - Low ISS Regular finger sticks  - advanced diet to soft bite sized solid with thin liquids.     Problem/Plan - 7:  ·  Problem: Hypertension.   ·  Plan: Home regimen: Losartan, Hydralazine  - Hold home Losartan and Hydralazine given hypotension/septic shock  - dced levophed gtt in ICU  - restarted diltiazem- now on 60mg Q6 (on cardizem CD 240mg daily at home)  - wean off IV steroids (now on solucortef 50mg Q12H).     Problem/Plan - 8:  ·  Problem: Hyperlipidemia.   ·  Plan: - Continue statin.     Problem/Plan - 9:  ·  Problem: Need for prophylactic measure.   ·  Plan: DVT ppx: started Eliquis - as pt have hx of  dvt.

## 2023-08-17 NOTE — CONSULT NOTE ADULT - SUBJECTIVE AND OBJECTIVE BOX
INCOMPLETE     HPI:  Pt seen and examined at bedside. He is mostly non-verbal but is able to respond to yes/no questions when coached or with eye-blinking. Minimal success with communication today. Pt had a Cr. of 1.5-2 recently during his time in the ICU, downgraded to floors yesterday. Cr. has been slowly decreasing to 1.3 now with baseline at .6-.8. Potassium has remained <3 in the past 24 hours with multiple attempts to replete yesterday including 40mEq x 3 and a 10mEq IVPB x3 yesterday. Scheduled for 40mEq x3 today. Pt has had all of his medications renally dosed since admission to the ICU. Denies current cp, difficulty breathing, abd pain.    Vitals: T 98.7F, HR 78 --> 131 (Afib), /63 --> 76/52, RR 16, SpO2 98% NRB  Labs: WBC 45, Lactate 14, CO2 11, BUN 31, Cr 1.5, Glucose 66, GFR 49, pro-BNP 1654  VBG: pH 7.2, pCO2 23, pO2 129, HCO3 9  EKG: Afib @ 98bpm, RVH    CT Head:   Persistent posterior parafalcine subdural hematoma identified measuring   approximately 4 mm in thickness, without interval change as compared to   prior exam from 6 hours ago. No new mass effect, intraventricular   extension, or new sites of intracranial hemorrhage identified.    Previously suspected subarachnoid hemorrhage at the right frontal high   convexity is favored to represent streak artifact.    Multifocal chronic infarcts present, superimposed upon stable background   white matter hypoattenuation.    CT A/P:   Bilateral pulmonary groundglass opacities likely reflects atypical   pneumonia.  Thickening versus underdistention of the distal sigmoid colon. Correlate   for symptoms of colitis.     (13 Aug 2023 04:03)       PAST MEDICAL & SURGICAL HISTORY:  TBI (traumatic brain injury)      HTN (hypertension)      Atrial fibrillation      Peripheral neuropathy      Major depression      HLD (hyperlipidemia)      CAD (coronary artery disease)      BPH (benign prostatic hyperplasia)      Pneumonia due to COVID-19 virus  2022      Hemorrhage in the brain      H/O craniotomy         FAMILY HISTORY:  FH: HTN (hypertension) (Father, Mother, Sibling)    Family history of bone cancer (Sibling)    FH: Alzheimers disease (Sibling)    NC    Social History:Non smoker    MEDICATIONS  (STANDING):  apixaban 2.5 milliGRAM(s) Oral every 12 hours  atorvastatin 40 milliGRAM(s) Oral at bedtime  chlorhexidine 2% Cloths 1 Application(s) Topical every 24 hours  dextrose 5%. 1000 milliLiter(s) (100 mL/Hr) IV Continuous <Continuous>  dextrose 5%. 1000 milliLiter(s) (50 mL/Hr) IV Continuous <Continuous>  dextrose 50% Injectable 25 Gram(s) IV Push once  dextrose 50% Injectable 12.5 Gram(s) IV Push once  dextrose 50% Injectable 25 Gram(s) IV Push once  diltiazem    Tablet 60 milliGRAM(s) Oral every 6 hours  glucagon  Injectable 1 milliGRAM(s) IntraMuscular once  hydrocortisone sodium succinate Injectable 50 milliGRAM(s) IV Push every 8 hours  insulin lispro (ADMELOG) corrective regimen sliding scale   SubCutaneous at bedtime  insulin lispro (ADMELOG) corrective regimen sliding scale   SubCutaneous three times a day before meals  mirtazapine 7.5 milliGRAM(s) Oral daily  mupirocin 2% Nasal 1 Application(s) Both Nostrils every 12 hours  pantoprazole  Injectable 40 milliGRAM(s) IV Push daily  piperacillin/tazobactam IVPB.. 3.375 Gram(s) IV Intermittent every 8 hours  potassium chloride   Powder 40 milliEquivalent(s) Oral every 4 hours  sodium chloride 3%  Inhalation 4 milliLiter(s) Inhalation every 12 hours    MEDICATIONS  (PRN):  dextrose Oral Gel 15 Gram(s) Oral once PRN Blood Glucose LESS THAN 70 milliGRAM(s)/deciliter  sodium chloride 0.9% lock flush 10 milliLiter(s) IV Push every 1 hour PRN Pre/post blood products, medications, blood draw, and to maintain line patency   Meds reviewed    Allergies    No Known Allergies    Intolerances         REVIEW OF SYSTEMS:    Review of Systems difficult to obtain via communication difficulty:   Constitutional: Denies fatigue  HEENT: Denies headaches   Respiratory: denies SOB, cough, or wheezing  Cardiovascular: denies CP, palpitations  Gastrointestinal: Denies nausea, denies vomiting, diarrhea, constipation, abdominal pain, or bloody stools  Genitourinary: denies bloody urine      Vital Signs Last 24 Hrs  T(C): 36.7 (17 Aug 2023 11:14), Max: 36.7 (17 Aug 2023 11:14)  T(F): 98.1 (17 Aug 2023 11:14), Max: 98.1 (17 Aug 2023 11:14)  HR: 68 (17 Aug 2023 11:40) (67 - 79)  BP: 179/99 (17 Aug 2023 11:40) (132/73 - 179/99)  BP(mean): --  RR: 18 (17 Aug 2023 11:14) (17 - 18)  SpO2: 94% (17 Aug 2023 11:14) (94% - 96%)    Parameters below as of 17 Aug 2023 11:14  Patient On (Oxygen Delivery Method): room air      Daily     Daily Weight in k.8 (17 Aug 2023 04:50)    PHYSICAL EXAM:    GENERAL: NAD  HEAD:  Atraumatic, Normocephalic, Old scar well healed s/p craniotomy  EYES: EOMI, conjunctiva and sclera clear  ENMT: No Drainage from nares, No drainage from ears  NECK: Supple, neck  veins full  NERVOUS SYSTEM:  Awake and Alert  CHEST/LUNG: Clear to auscultation bilaterally; No rales, rhonchi, wheezing, or rubs  HEART: Irregular rhythm; No murmurs, rubs, or gallops  ABDOMEN: Soft, Nontender, Nondistended;   EXTREMITIES:  No peripheral edema      LABS:                        9.5    8.69  )-----------( 210      ( 17 Aug 2023 08:16 )             29.2     08-17    146<H>  |  110<H>  |  28<H>  ----------------------------<  189<H>  3.0<L>   |  28  |  1.30    Ca    8.7      17 Aug 2023 08:16  Phos  2.0     08-17  Mg     2.0     08-17    TPro  6.1  /  Alb  3.1<L>  /  TBili  0.7  /  DBili  x   /  AST  11<L>  /  ALT  19  /  AlkPhos  64  08-17      Urinalysis Basic - ( 17 Aug 2023 08:16 )    Color: x / Appearance: x / SG: x / pH: x  Gluc: 189 mg/dL / Ketone: x  / Bili: x / Urobili: x   Blood: x / Protein: x / Nitrite: x   Leuk Esterase: x / RBC: x / WBC x   Sq Epi: x / Non Sq Epi: x / Bacteria: x      Magnesium: 2.0 mg/dL ( @ 08:16)  Phosphorus: 2.0 mg/dL ( @ 08:16)       HPI:  Pt seen and examined at bedside. He is mostly non-verbal but is able to respond to yes/no questions when coached or with eye-blinking. Minimal success with communication today. Pt had a Cr. of 1.5-2 recently during his time in the ICU, downgraded to floors yesterday. Cr. has been slowly decreasing to 1.3 now with baseline at .6-.8. Potassium has remained <3 in the past 24 hours with multiple attempts to replete yesterday including 40mEq x 3 and a 10mEq IVPB x3 yesterday. Scheduled for 40mEq x3 today. Pt has had all of his medications renally dosed since admission to the ICU. Denies current cp, difficulty breathing, abd pain.    Vitals: T 98.7F, HR 78 --> 131 (Afib), /63 --> 76/52, RR 16, SpO2 98% NRB  Labs: WBC 45, Lactate 14, CO2 11, BUN 31, Cr 1.5, Glucose 66, GFR 49, pro-BNP 1654  VBG: pH 7.2, pCO2 23, pO2 129, HCO3 9  EKG: Afib @ 98bpm, RVH    CT Head:   Persistent posterior parafalcine subdural hematoma identified measuring   approximately 4 mm in thickness, without interval change as compared to   prior exam from 6 hours ago. No new mass effect, intraventricular   extension, or new sites of intracranial hemorrhage identified.    Previously suspected subarachnoid hemorrhage at the right frontal high   convexity is favored to represent streak artifact.    Multifocal chronic infarcts present, superimposed upon stable background   white matter hypoattenuation.    CT A/P:   Bilateral pulmonary groundglass opacities likely reflects atypical   pneumonia.  Thickening versus underdistention of the distal sigmoid colon. Correlate   for symptoms of colitis.     (13 Aug 2023 04:03)       PAST MEDICAL & SURGICAL HISTORY:  TBI (traumatic brain injury)      HTN (hypertension)      Atrial fibrillation      Peripheral neuropathy      Major depression      HLD (hyperlipidemia)      CAD (coronary artery disease)      BPH (benign prostatic hyperplasia)      Pneumonia due to COVID-19 virus  2022      Hemorrhage in the brain      H/O craniotomy         FAMILY HISTORY:  FH: HTN (hypertension) (Father, Mother, Sibling)    Family history of bone cancer (Sibling)    FH: Alzheimers disease (Sibling)    NC    Social History:Non smoker    MEDICATIONS  (STANDING):  apixaban 2.5 milliGRAM(s) Oral every 12 hours  atorvastatin 40 milliGRAM(s) Oral at bedtime  chlorhexidine 2% Cloths 1 Application(s) Topical every 24 hours  dextrose 5%. 1000 milliLiter(s) (100 mL/Hr) IV Continuous <Continuous>  dextrose 5%. 1000 milliLiter(s) (50 mL/Hr) IV Continuous <Continuous>  dextrose 50% Injectable 25 Gram(s) IV Push once  dextrose 50% Injectable 12.5 Gram(s) IV Push once  dextrose 50% Injectable 25 Gram(s) IV Push once  diltiazem    Tablet 60 milliGRAM(s) Oral every 6 hours  glucagon  Injectable 1 milliGRAM(s) IntraMuscular once  hydrocortisone sodium succinate Injectable 50 milliGRAM(s) IV Push every 8 hours  insulin lispro (ADMELOG) corrective regimen sliding scale   SubCutaneous at bedtime  insulin lispro (ADMELOG) corrective regimen sliding scale   SubCutaneous three times a day before meals  mirtazapine 7.5 milliGRAM(s) Oral daily  mupirocin 2% Nasal 1 Application(s) Both Nostrils every 12 hours  pantoprazole  Injectable 40 milliGRAM(s) IV Push daily  piperacillin/tazobactam IVPB.. 3.375 Gram(s) IV Intermittent every 8 hours  potassium chloride   Powder 40 milliEquivalent(s) Oral every 4 hours  sodium chloride 3%  Inhalation 4 milliLiter(s) Inhalation every 12 hours    MEDICATIONS  (PRN):  dextrose Oral Gel 15 Gram(s) Oral once PRN Blood Glucose LESS THAN 70 milliGRAM(s)/deciliter  sodium chloride 0.9% lock flush 10 milliLiter(s) IV Push every 1 hour PRN Pre/post blood products, medications, blood draw, and to maintain line patency   Meds reviewed    Allergies    No Known Allergies    Intolerances         REVIEW OF SYSTEMS:    Review of Systems difficult to obtain via communication difficulty:   Constitutional: Denies fatigue  HEENT: Denies headaches   Respiratory: denies SOB, cough, or wheezing  Cardiovascular: denies CP, palpitations  Gastrointestinal: Denies nausea, denies vomiting, diarrhea, constipation, abdominal pain, or bloody stools  Genitourinary: denies bloody urine      Vital Signs Last 24 Hrs  T(C): 36.7 (17 Aug 2023 11:14), Max: 36.7 (17 Aug 2023 11:14)  T(F): 98.1 (17 Aug 2023 11:14), Max: 98.1 (17 Aug 2023 11:14)  HR: 68 (17 Aug 2023 11:40) (67 - 79)  BP: 179/99 (17 Aug 2023 11:40) (132/73 - 179/99)  BP(mean): --  RR: 18 (17 Aug 2023 11:14) (17 - 18)  SpO2: 94% (17 Aug 2023 11:14) (94% - 96%)    Parameters below as of 17 Aug 2023 11:14  Patient On (Oxygen Delivery Method): room air      Daily     Daily Weight in k.8 (17 Aug 2023 04:50)    PHYSICAL EXAM:    GENERAL: NAD  HEAD:  Atraumatic, Normocephalic, Old scar well healed s/p craniotomy  EYES: EOMI, conjunctiva and sclera clear  ENMT: No Drainage from nares, No drainage from ears  NECK: Supple, neck  veins full  NERVOUS SYSTEM:  Awake and Alert  CHEST/LUNG: Clear to auscultation bilaterally; No rales, rhonchi, wheezing, or rubs  HEART: Irregular rhythm; No murmurs, rubs, or gallops  ABDOMEN: Soft, Nontender, Nondistended;   EXTREMITIES:  No peripheral edema      LABS:                        9.5    8.69  )-----------( 210      ( 17 Aug 2023 08:16 )             29.2     08-17    146<H>  |  110<H>  |  28<H>  ----------------------------<  189<H>  3.0<L>   |  28  |  1.30    Ca    8.7      17 Aug 2023 08:16  Phos  2.0     08-17  Mg     2.0     08-17    TPro  6.1  /  Alb  3.1<L>  /  TBili  0.7  /  DBili  x   /  AST  11<L>  /  ALT  19  /  AlkPhos  64  08-17      Urinalysis Basic - ( 17 Aug 2023 08:16 )    Color: x / Appearance: x / SG: x / pH: x  Gluc: 189 mg/dL / Ketone: x  / Bili: x / Urobili: x   Blood: x / Protein: x / Nitrite: x   Leuk Esterase: x / RBC: x / WBC x   Sq Epi: x / Non Sq Epi: x / Bacteria: x      Magnesium: 2.0 mg/dL ( @ 08:16)  Phosphorus: 2.0 mg/dL ( @ 08:16)

## 2023-08-18 ENCOUNTER — TRANSCRIPTION ENCOUNTER (OUTPATIENT)
Age: 73
End: 2023-08-18

## 2023-08-18 LAB
ALBUMIN SERPL ELPH-MCNC: 3.3 G/DL — SIGNIFICANT CHANGE UP (ref 3.3–5)
ALP SERPL-CCNC: 69 U/L — SIGNIFICANT CHANGE UP (ref 40–120)
ALT FLD-CCNC: 26 U/L — SIGNIFICANT CHANGE UP (ref 12–78)
ANION GAP SERPL CALC-SCNC: 9 MMOL/L — SIGNIFICANT CHANGE UP (ref 5–17)
AST SERPL-CCNC: 15 U/L — SIGNIFICANT CHANGE UP (ref 15–37)
BILIRUB SERPL-MCNC: 0.8 MG/DL — SIGNIFICANT CHANGE UP (ref 0.2–1.2)
BUN SERPL-MCNC: 25 MG/DL — HIGH (ref 7–23)
CALCIUM SERPL-MCNC: 8.6 MG/DL — SIGNIFICANT CHANGE UP (ref 8.5–10.1)
CHLORIDE SERPL-SCNC: 111 MMOL/L — HIGH (ref 96–108)
CO2 SERPL-SCNC: 27 MMOL/L — SIGNIFICANT CHANGE UP (ref 22–31)
CREAT ?TM UR-MCNC: 37 MG/DL — SIGNIFICANT CHANGE UP
CREAT SERPL-MCNC: 1.1 MG/DL — SIGNIFICANT CHANGE UP (ref 0.5–1.3)
CULTURE RESULTS: SIGNIFICANT CHANGE UP
CULTURE RESULTS: SIGNIFICANT CHANGE UP
EGFR: 72 ML/MIN/1.73M2 — SIGNIFICANT CHANGE UP
GLUCOSE SERPL-MCNC: 179 MG/DL — HIGH (ref 70–99)
HCT VFR BLD CALC: 32 % — LOW (ref 39–50)
HGB BLD-MCNC: 10.3 G/DL — LOW (ref 13–17)
MAGNESIUM RBC-MCNC: 4.8 MG/DL — SIGNIFICANT CHANGE UP (ref 4.2–6.8)
MAGNESIUM SERPL-MCNC: 1.8 MG/DL — SIGNIFICANT CHANGE UP (ref 1.6–2.6)
MCHC RBC-ENTMCNC: 24.5 PG — LOW (ref 27–34)
MCHC RBC-ENTMCNC: 32.2 GM/DL — SIGNIFICANT CHANGE UP (ref 32–36)
MCV RBC AUTO: 76.2 FL — LOW (ref 80–100)
NRBC # BLD: 0 /100 WBCS — SIGNIFICANT CHANGE UP (ref 0–0)
OSMOLALITY UR: 451 MOSM/KG — SIGNIFICANT CHANGE UP (ref 50–1200)
PHOSPHATE SERPL-MCNC: 2.8 MG/DL — SIGNIFICANT CHANGE UP (ref 2.5–4.5)
PLATELET # BLD AUTO: 220 K/UL — SIGNIFICANT CHANGE UP (ref 150–400)
POTASSIUM SERPL-MCNC: 3 MMOL/L — LOW (ref 3.5–5.3)
POTASSIUM SERPL-SCNC: 3 MMOL/L — LOW (ref 3.5–5.3)
POTASSIUM UR-SCNC: 53.4 MMOL/L — SIGNIFICANT CHANGE UP
PROT ?TM UR-MCNC: 123 MG/DL — HIGH (ref 0–12)
PROT SERPL-MCNC: 6.3 G/DL — SIGNIFICANT CHANGE UP (ref 6–8.3)
PROT/CREAT UR-RTO: 3.3 RATIO — HIGH (ref 0–0.2)
RBC # BLD: 4.2 M/UL — SIGNIFICANT CHANGE UP (ref 4.2–5.8)
RBC # FLD: 17.3 % — HIGH (ref 10.3–14.5)
SODIUM SERPL-SCNC: 147 MMOL/L — HIGH (ref 135–145)
SODIUM UR-SCNC: 73 MMOL/L — SIGNIFICANT CHANGE UP
SPECIMEN SOURCE: SIGNIFICANT CHANGE UP
SPECIMEN SOURCE: SIGNIFICANT CHANGE UP
UUN UR-MCNC: 515 MG/DL — SIGNIFICANT CHANGE UP
WBC # BLD: 9.91 K/UL — SIGNIFICANT CHANGE UP (ref 3.8–10.5)
WBC # FLD AUTO: 9.91 K/UL — SIGNIFICANT CHANGE UP (ref 3.8–10.5)

## 2023-08-18 PROCEDURE — 99232 SBSQ HOSP IP/OBS MODERATE 35: CPT

## 2023-08-18 RX ORDER — DILTIAZEM HCL 120 MG
1 CAPSULE, EXT RELEASE 24 HR ORAL
Qty: 0 | Refills: 0 | DISCHARGE
Start: 2023-08-18

## 2023-08-18 RX ORDER — SODIUM CHLORIDE 9 MG/ML
1000 INJECTION, SOLUTION INTRAVENOUS
Refills: 0 | Status: DISCONTINUED | OUTPATIENT
Start: 2023-08-18 | End: 2023-08-19

## 2023-08-18 RX ORDER — HYDRALAZINE HCL 50 MG
1 TABLET ORAL
Refills: 0 | DISCHARGE

## 2023-08-18 RX ORDER — POTASSIUM CHLORIDE 20 MEQ
2 PACKET (EA) ORAL
Qty: 28 | Refills: 0
Start: 2023-08-18 | End: 2023-08-24

## 2023-08-18 RX ORDER — POTASSIUM CHLORIDE 20 MEQ
10 PACKET (EA) ORAL
Refills: 0 | Status: COMPLETED | OUTPATIENT
Start: 2023-08-18 | End: 2023-08-18

## 2023-08-18 RX ORDER — HYDRALAZINE HCL 50 MG
10 TABLET ORAL ONCE
Refills: 0 | Status: COMPLETED | OUTPATIENT
Start: 2023-08-18 | End: 2023-08-18

## 2023-08-18 RX ORDER — PANTOPRAZOLE SODIUM 20 MG/1
1 TABLET, DELAYED RELEASE ORAL
Qty: 30 | Refills: 0
Start: 2023-08-18 | End: 2023-09-16

## 2023-08-18 RX ORDER — APIXABAN 2.5 MG/1
1 TABLET, FILM COATED ORAL
Refills: 0 | DISCHARGE

## 2023-08-18 RX ORDER — LOSARTAN POTASSIUM 100 MG/1
50 TABLET, FILM COATED ORAL
Qty: 0 | Refills: 0 | DISCHARGE

## 2023-08-18 RX ORDER — ROSUVASTATIN CALCIUM 5 MG/1
1 TABLET ORAL
Refills: 0 | DISCHARGE

## 2023-08-18 RX ORDER — APIXABAN 2.5 MG/1
1 TABLET, FILM COATED ORAL
Qty: 0 | Refills: 0 | DISCHARGE
Start: 2023-08-18

## 2023-08-18 RX ORDER — DILTIAZEM HCL 120 MG
1 CAPSULE, EXT RELEASE 24 HR ORAL
Refills: 0 | DISCHARGE

## 2023-08-18 RX ORDER — HYDRALAZINE HCL 50 MG
25 TABLET ORAL THREE TIMES A DAY
Refills: 0 | Status: DISCONTINUED | OUTPATIENT
Start: 2023-08-18 | End: 2023-08-20

## 2023-08-18 RX ORDER — ATORVASTATIN CALCIUM 80 MG/1
1 TABLET, FILM COATED ORAL
Qty: 0 | Refills: 0 | DISCHARGE
Start: 2023-08-18

## 2023-08-18 RX ORDER — MIRTAZAPINE 45 MG/1
1 TABLET, ORALLY DISINTEGRATING ORAL
Qty: 0 | Refills: 0 | DISCHARGE

## 2023-08-18 RX ORDER — POTASSIUM CHLORIDE 20 MEQ
10 PACKET (EA) ORAL
Refills: 0 | Status: DISCONTINUED | OUTPATIENT
Start: 2023-08-18 | End: 2023-08-20

## 2023-08-18 RX ORDER — HYDRALAZINE HCL 50 MG
1 TABLET ORAL
Qty: 0 | Refills: 0 | DISCHARGE
Start: 2023-08-18

## 2023-08-18 RX ORDER — MIRTAZAPINE 45 MG/1
1 TABLET, ORALLY DISINTEGRATING ORAL
Qty: 0 | Refills: 0 | DISCHARGE
Start: 2023-08-18

## 2023-08-18 RX ADMIN — Medication 10 MILLIGRAM(S): at 05:18

## 2023-08-18 RX ADMIN — PIPERACILLIN AND TAZOBACTAM 25 GRAM(S): 4; .5 INJECTION, POWDER, LYOPHILIZED, FOR SOLUTION INTRAVENOUS at 22:31

## 2023-08-18 RX ADMIN — MUPIROCIN 1 APPLICATION(S): 20 OINTMENT TOPICAL at 17:30

## 2023-08-18 RX ADMIN — Medication 25 MILLIGRAM(S): at 14:30

## 2023-08-18 RX ADMIN — APIXABAN 2.5 MILLIGRAM(S): 2.5 TABLET, FILM COATED ORAL at 17:31

## 2023-08-18 RX ADMIN — Medication 2: at 11:42

## 2023-08-18 RX ADMIN — Medication 100 MILLIEQUIVALENT(S): at 23:00

## 2023-08-18 RX ADMIN — Medication 100 MILLIEQUIVALENT(S): at 21:44

## 2023-08-18 RX ADMIN — MUPIROCIN 1 APPLICATION(S): 20 OINTMENT TOPICAL at 05:21

## 2023-08-18 RX ADMIN — PIPERACILLIN AND TAZOBACTAM 25 GRAM(S): 4; .5 INJECTION, POWDER, LYOPHILIZED, FOR SOLUTION INTRAVENOUS at 14:33

## 2023-08-18 RX ADMIN — ATORVASTATIN CALCIUM 40 MILLIGRAM(S): 80 TABLET, FILM COATED ORAL at 21:44

## 2023-08-18 RX ADMIN — Medication 1: at 07:54

## 2023-08-18 RX ADMIN — PANTOPRAZOLE SODIUM 40 MILLIGRAM(S): 20 TABLET, DELAYED RELEASE ORAL at 12:34

## 2023-08-18 RX ADMIN — Medication 60 MILLIGRAM(S): at 17:31

## 2023-08-18 RX ADMIN — Medication 1: at 17:02

## 2023-08-18 RX ADMIN — Medication 50 MILLIGRAM(S): at 12:34

## 2023-08-18 RX ADMIN — PIPERACILLIN AND TAZOBACTAM 25 GRAM(S): 4; .5 INJECTION, POWDER, LYOPHILIZED, FOR SOLUTION INTRAVENOUS at 05:21

## 2023-08-18 RX ADMIN — Medication 100 MILLIEQUIVALENT(S): at 18:50

## 2023-08-18 RX ADMIN — CHLORHEXIDINE GLUCONATE 1 APPLICATION(S): 213 SOLUTION TOPICAL at 05:17

## 2023-08-18 RX ADMIN — SODIUM CHLORIDE 4 MILLILITER(S): 9 INJECTION INTRAMUSCULAR; INTRAVENOUS; SUBCUTANEOUS at 20:00

## 2023-08-18 RX ADMIN — MIRTAZAPINE 7.5 MILLIGRAM(S): 45 TABLET, ORALLY DISINTEGRATING ORAL at 12:33

## 2023-08-18 RX ADMIN — APIXABAN 2.5 MILLIGRAM(S): 2.5 TABLET, FILM COATED ORAL at 08:23

## 2023-08-18 RX ADMIN — Medication 100 MILLIEQUIVALENT(S): at 02:26

## 2023-08-18 RX ADMIN — Medication 25 MILLIGRAM(S): at 21:49

## 2023-08-18 RX ADMIN — SODIUM CHLORIDE 4 MILLILITER(S): 9 INJECTION INTRAMUSCULAR; INTRAVENOUS; SUBCUTANEOUS at 07:31

## 2023-08-18 RX ADMIN — Medication 100 MILLIEQUIVALENT(S): at 01:16

## 2023-08-18 RX ADMIN — Medication 60 MILLIGRAM(S): at 12:33

## 2023-08-18 NOTE — PROGRESS NOTE ADULT - SUBJECTIVE AND OBJECTIVE BOX
[facility] -- Alexey Briceno, Agus Sepulveda Savella, Goodger: Office # [phone]    Follow Up:  NADIR Wallace  Subjective/Observations: Patient awake, alert, resting in bed. No complaints of chest pain, dyspnea, Tolerating room air.  non verbal conversation. IV 1/2 NS at 75cc/hr. Chow to bedside draining natasha color urine    REVIEW OF SYSTEMS: All other review of systems are negative unless indicated above    PAST MEDICAL & SURGICAL HISTORY:  TBI (traumatic brain injury)      HTN (hypertension)      Atrial fibrillation      Peripheral neuropathy      Major depression      HLD (hyperlipidemia)      CAD (coronary artery disease)      BPH (benign prostatic hyperplasia)      Pneumonia due to COVID-19 virus  June 2022      Hemorrhage in the brain      H/O craniotomy          MEDICATIONS  (STANDING):  apixaban 2.5 milliGRAM(s) Oral every 12 hours  atorvastatin 40 milliGRAM(s) Oral at bedtime  chlorhexidine 2% Cloths 1 Application(s) Topical every 24 hours  dextrose 5%. 1000 milliLiter(s) (100 mL/Hr) IV Continuous <Continuous>  dextrose 5%. 1000 milliLiter(s) (50 mL/Hr) IV Continuous <Continuous>  dextrose 50% Injectable 25 Gram(s) IV Push once  dextrose 50% Injectable 12.5 Gram(s) IV Push once  dextrose 50% Injectable 25 Gram(s) IV Push once  diltiazem    Tablet 60 milliGRAM(s) Oral every 6 hours  glucagon  Injectable 1 milliGRAM(s) IntraMuscular once  hydrocortisone sodium succinate Injectable 50 milliGRAM(s) IV Push every 12 hours  insulin lispro (ADMELOG) corrective regimen sliding scale   SubCutaneous three times a day before meals  insulin lispro (ADMELOG) corrective regimen sliding scale   SubCutaneous at bedtime  mirtazapine 7.5 milliGRAM(s) Oral daily  mupirocin 2% Nasal 1 Application(s) Both Nostrils every 12 hours  pantoprazole  Injectable 40 milliGRAM(s) IV Push daily  piperacillin/tazobactam IVPB.. 3.375 Gram(s) IV Intermittent every 8 hours  sodium chloride 0.45%. 1000 milliLiter(s) (75 mL/Hr) IV Continuous <Continuous>  sodium chloride 3%  Inhalation 4 milliLiter(s) Inhalation every 12 hours    MEDICATIONS  (PRN):  dextrose Oral Gel 15 Gram(s) Oral once PRN Blood Glucose LESS THAN 70 milliGRAM(s)/deciliter  sodium chloride 0.9% lock flush 10 milliLiter(s) IV Push every 1 hour PRN Pre/post blood products, medications, blood draw, and to maintain line patency    Allergies    No Known Allergies    Intolerances      Vital Signs Last 24 Hrs  T(C): 36.3 (18 Aug 2023 04:22), Max: 36.7 (17 Aug 2023 11:14)  T(F): 97.4 (18 Aug 2023 04:22), Max: 98.1 (17 Aug 2023 11:14)  HR: 74 (18 Aug 2023 07:54) (67 - 75)  BP: 172/74 (18 Aug 2023 05:45) (159/83 - 198/110)  BP(mean): --  RR: 16 (18 Aug 2023 04:22) (16 - 18)  SpO2: 98% (18 Aug 2023 07:54) (94% - 98%)    Parameters below as of 18 Aug 2023 07:54  Patient On (Oxygen Delivery Method): room air      I&O's Summary    17 Aug 2023 07:01  -  18 Aug 2023 07:00  --------------------------------------------------------  IN: 1100 mL / OUT: 1650 mL / NET: -550 mL          TELE: not on tele  PHYSICAL EXAM:  Constitutional: NAD, awake and alert,   HEENT: Moist Mucous Membranes, Anicteric  Pulmonary: Non-labored, breath sounds are clear bilaterally, No wheezing, rales or rhonchi  Cardiovascular: Regular, S1 and S2, No murmurs, No rubs, gallops or clicks  Gastrointestinal:  soft, nontender, nondistended   Lymph: No peripheral edema. No lymphadenopathy.           LABS: All Labs Reviewed:                        9.5    8.69  )-----------( 210      ( 17 Aug 2023 08:16 )             29.2                         10.0   9.01  )-----------( 208      ( 16 Aug 2023 06:07 )             30.0     17 Aug 2023 18:45    147    |  111    |  26     ----------------------------<  245    3.0     |  27     |  1.40   17 Aug 2023 08:16    146    |  110    |  28     ----------------------------<  189    3.0     |  28     |  1.30   16 Aug 2023 17:38    145    |  110    |  34     ----------------------------<  225    2.9     |  29     |  1.50     Ca    8.3        17 Aug 2023 18:45  Ca    8.7        17 Aug 2023 08:16  Ca    8.3        16 Aug 2023 17:38  Phos  2.0       17 Aug 2023 08:16  Phos  3.0       16 Aug 2023 06:07  Mg     2.0       17 Aug 2023 08:16  Mg     1.9       16 Aug 2023 06:07    TPro  6.1    /  Alb  3.1    /  TBili  0.7    /  DBili  x      /  AST  11     /  ALT  19     /  AlkPhos  64     17 Aug 2023 08:16  TPro  5.8    /  Alb  3.0    /  TBili  0.6    /  DBili  x      /  AST  10     /  ALT  16     /  AlkPhos  63     16 Aug 2023 06:07   LIVER FUNCTIONS - ( 17 Aug 2023 08:16 )  Alb: 3.1 g/dL / Pro: 6.1 g/dL / ALK PHOS: 64 U/L / ALT: 19 U/L / AST: 11 U/L / GGT: x           Troponin I, High Sensitivity Result: 76.9 ng/L (08-13-23 @ 09:33)  Troponin I, High Sensitivity Result: 6.7 ng/L (08-12-23 @ 21:50)    12 Lead ECG:   Ventricular Rate 98 BPM    QRS Duration 76 ms    Q-T Interval 342 ms    QTC Calculation(Bazett) 436 ms    R Axis 213 degrees    T Axis 19 degrees    Diagnosis Line Atrial fibrillation  Right ventricular hypertrophy  Inferior infarct (cited on or before 22-MAY-2022)  Anterolateral infarct (cited on or before 22-MAY-2022)  Abnormal ECG  When compared with ECG of 10-IVAN-2023 20:29,  Questionable change in initial forces of Septal leads  Confirmed by Lexi Andujar ([id number]) on 8/13/2023 7:25:42 AM  (08-12-23 @ 20:39)      TRANSTHORACIC ECHOCARDIOGRAM REPORT  ________________________________________________________________________________                                      _______       Pt. Name:       MELBA PARMAR Study Date:    8/14/2023  MRN:            [id number]       YOB: 1950  Accession #:    [id number]      Age:           73 years  Account#:       [id number]     Gender:        M  Heart Rate:                    Height:        69.00 in (175.26 cm)  Rhythm:                        Weight:  134.00 lb (60.78 kg)  Blood Pressure: 120/74 mmHg    BSA/BMI:       1.74 m² / 19.79 kg/m²  ________________________________________________________________________________________  Referring Physician:    [id number] Ranulfo Boggs  Interpreting Physician: Lexi Seals  Primary Sonographer:    Celia Whitmore [facility]    CPT:               ECHO TTE WO CON COMP W DOPP - 33774.m  Indication(s):     Shock, unspecified - R57.9  Procedure:         Transthoracic echocardiogram with 2-D, M-mode and complete                     spectral and color flow Doppler.  Ordering Location: ICU1  Study Information: Image quality for this study is technically difficult.    _______________________________________________________________________________________     CONCLUSIONS:      1. Technically difficult image quality.   2. Left ventricular systolic function is normal with an ejection fraction visually estimated at 60 to 65 %.   3. The right ventricle is not well visualized.   4. The left atrium is mildly dilated in size.   5. Aortic valve was not well visualized.   6. Trace mitral regurgitation.   7. Trace tricuspid regurgitation.    ________________________________________________________________________________________  FINDINGS:     Left Ventricle:  Left ventricular systolic function is normal with an ejection fraction visually estimated at 60 to 65%.     Right Ventricle:  The right ventricle is not well visualized. Normal wall thickness.     Left Atrium:  The left atrium is mildly dilated in size.     Right Atrium:  The right atrium is normal in size.     Aortic Valve:  The aortic valve was not well visualized.     Mitral Valve:  There is trace mitral regurgitation.     Tricuspid Valve:  There is trace tricuspid regurgitation. Estimated pulmonary artery systolic pressure is 29 mmHg.     Pulmonic Valve:  The pulmonic valve was not well visualized.     Pericardium:  No pericardial effusion seen.     Systemic Veins:  The inferior vena cava is normal in size (normal <2.1cm) with normal inspiratory collapse (normal >50%) consistent with normal right atrial pressure (~3, range 0-5mmHg).  ____________________________________________________________________  Quantitative Data:  Left Ventricle Measurements: (Indexed to BSA)     IVSd (2D):   1.2 cm  LVPWd (2D):  1.2 cm  LVIDd (2D):  4.1 cm  LVIDs (2D):  2.6 cm  LV Mass:     167 g  96.0 g/m²  Visualized LV EF%: 60 to 65%     MV E Vmax:    0.73 m/s  e' lateral:   12.00 cm/s  e' medial:    8.49 cm/s  E/e' lateral: 6.12  E/e' medial:  8.65  E/e' Average: 7.16  MV DT:        120 msec       Left Atrium Measurements: (Indexed to BSA)  LA Diam 2D: 4.10 cm       LVOT / RVOT/ Qp/Qs Data: (Indexed to BSA)  LVOT Diameter: 2.00 cm    Mitral Valve Measurements:     MV E Vmax: 0.7 m/s       Tricuspid Valve Measurements:     TR Vmax:          2.5 m/s  TR Peak Gradient: 25.6 mmHg  RA Pressure:      3 mmHg  PASP:             29 mmHg    ________________________________________________________________________________________  Electronically signed on8/15/2023 at 12:43:28 PM by Lexi Seals         *** Final ***

## 2023-08-18 NOTE — PROGRESS NOTE ADULT - ASSESSMENT
72 y/o M w/ PMH TBI, A-fib on Eliquis, DM2, HTN, HLD presents with sepsis pneumonia and LATOYA.    LATOYA on CKD  Hypernatremia  Hypophosphatemia  Hypokalemia  HTN        -Hypotonic IVF   -Hypokalemia likely due to post ATN diuresis in combination with decreased PO intake/poor nutrition. Replace   -LATOYA improving to 1.3. Baseline .6-.8  -Monitor Mg, Phos  -F/u urine studies.  -Daily BMP  -Continue home meds as allowed due to hypotension  -All other management per primary team    8/17/-d/w family

## 2023-08-18 NOTE — DISCHARGE NOTE PROVIDER - NSDCCPCAREPLAN_GEN_ALL_CORE_FT
PRINCIPAL DISCHARGE DIAGNOSIS  Diagnosis: Pneumonia  Assessment and Plan of Treatment: You were treated with course of IV antibiotics. Please follow up with your PCP within 1 week of discharge.      SECONDARY DISCHARGE DIAGNOSES  Diagnosis: Hypertension  Assessment and Plan of Treatment: START Hydralazine 25 mg three times daily, prescription was sent to pharmacy.    Diagnosis: Chronic atrial fibrillation  Assessment and Plan of Treatment: Resume home medication regimen    Diagnosis: Type 2 diabetes mellitus with lactic acidosis  Assessment and Plan of Treatment: Resume home medication regimen.     PRINCIPAL DISCHARGE DIAGNOSIS  Diagnosis: Pneumonia  Assessment and Plan of Treatment: You were treated with course of IV antibiotics. [facility]. Please follow up with your PCP within 1 week of discharge.      SECONDARY DISCHARGE DIAGNOSES  Diagnosis: Hypertension  Assessment and Plan of Treatment: START Hydralazine 25 mg three times daily, prescription was sent to pharmacy.    Diagnosis: Chronic atrial fibrillation  Assessment and Plan of Treatment: Resume home medication regimen    Diagnosis: Type 2 diabetes mellitus with lactic acidosis  Assessment and Plan of Treatment: Resume home medication regimen.

## 2023-08-18 NOTE — PROGRESS NOTE ADULT - ASSESSMENT
73M with PMH DM2, HTN, HLD A-fib (on Eliquis), TBI (s/p SDH with EVAC on 9/2021), and traumatic subdural hemorrhage in 10/2022, BIBEMS with hypoxia, nausea and vomiting. Admitted to ICU for sepsis pneumonia, latoya, and metabolic acidosis.   Cardiologist: Dr. Mirta Goldsmith    A fib, HTN, HLD  - EKG showed atrial fibrillation on admission , no acute changes on EKG compared to previous.  - No clear evidence of acute ischemia, trops negative x 2.   - Continue Lipitor     - Pt , was initially tachy, now rate controlled off tele   -- In the past when AC was held due to subdural hemorrhage, patient developed DVT.  - continue eliquis @ 2.5 mg bid   -continue  Cardizem, up titrate to 60 QQ6H    - BP elevated 160s-190s   - Patient initially required pressors during this admission for hypotension. Patient was weaned off of pressors  - Received hydralazine 5mg IVP x1 on  8/18 ~5am  for 194/92, 190/90 . Refused  to take Po  AM dose Cardizem  - will hold home dose losartan for LATOYA  - start on Hydralazine 25mg Po tid( home dose 50mg TID)  - consider  weaning off  cortef.    - Previous TTE on 6/11/23 showed LVEF 60-65% with trace mitral regurgitation.  - No meaningful evidence of volume overload.    - Monitor and replete lytes, keep K>4, Mg>2.  - Will continue to follow.    Gagan Grove, MS ANP, AGACNP  Nurse Practitioner- Cardiology   Spectra #[phone] /[phone] 73M with PMH DM2, HTN, HLD A-fib (on Eliquis), TBI (s/p SDH with EVAC on 9/2021), and traumatic subdural hemorrhage in 10/2022, BIBEMS with hypoxia, nausea and vomiting. Admitted to ICU for sepsis pneumonia, bin, and metabolic acidosis.   Cardiologist: Dr. Mirta Goldsmith    A fib, HTN, HLD  - EKG showed atrial fibrillation on admission , no acute changes on EKG compared to previous.  - No clear evidence of acute ischemia, trops negative x 2.   - Continue Lipitor     - Pt , was initially tachy, now rate controlled off tele   - In the past when AC was held due to subdural hemorrhage, patient developed DVT.  - continue eliquis @ 2.5 mg bid   -continue  Cardizem, up titrate to 60 QQ6H      - BP elevated 160s-190s   - Patient initially required pressors during this admission for hypotension. Patient was weaned off of pressors  - Received hydralazine 5mg IVP x1 on  8/18 ~5am  for 194/92, 190/90 . Refused  to take Po  AM dose Cardizem  -Today creat 1.1- resume losartan if S creat remain stable    - start on Hydralazine 25mg Po tid( home dose 50mg TID)  - consider  weaning off  cortef.    - Previous TTE on 6/11/23 showed LVEF 60-65% with trace mitral regurgitation.  - No meaningful evidence of volume overload.     - please replete K+ - today 3.3  - Monitor and replete lytes, keep K>4, Mg>2.  - Will continue to follow.    Gagan Grove, MS ANP, AGAP  Nurse Practitioner- Cardiology   Spectra #[phone] /[phone] 73M with PMH DM2, HTN, HLD A-fib (on Eliquis), TBI (s/p SDH with EVAC on 9/2021), and traumatic subdural hemorrhage in 10/2022, BIBEMS with hypoxia, nausea and vomiting. Admitted to ICU for sepsis pneumonia, bin, and metabolic acidosis.   Cardiologist: Dr. Mirta Goldsmith    A fib, HTN, HLD  - EKG showed atrial fibrillation on admission , no acute changes on EKG compared to previous.  - No clear evidence of acute ischemia, trops negative x 2.   - Continue Lipitor     - Pt , was initially tachy, now rate controlled off tele   - In the past when AC was held due to subdural hemorrhage, patient developed DVT.  - continue eliquis @ 2.5 mg bid   - continue  Cardizem, up titrate to 60 QQ6H      - BP elevated 160s-190s   - Patient initially required pressors during this admission for hypotension. Patient was weaned off of pressors  - Received hydralazine 5mg IVP x1 on  8/18 ~5am  for 194/92, 190/90 . Refused  to take Po  AM dose Cardizem  - Today creat 1.1- resume losartan if S creat remain stable    - start on Hydralazine 25mg Po tid( home dose 50mg TID)  - consider  weaning off  cortef.    - Previous TTE on 6/11/23 showed LVEF 60-65% with trace mitral regurgitation.  - No meaningful evidence of volume overload.     - please replete K+ - today 3.3  - Monitor and replete lytes, keep K>4, Mg>2.  - Will continue to follow.    Gagan Grove, MS ANP, AGAP  Nurse Practitioner- Cardiology   Spectra #[phone] /[phone]

## 2023-08-18 NOTE — PROGRESS NOTE ADULT - ASSESSMENT
72 y/o M with PMHx DM2, HTN, HLD A-fib (on Eliquis), TBI (s/p SDH with EVAC on 9/2021), and traumatic subdural hemorrhage in 10/2022, BIBEMS with nausea and vomiting. Admitted to ICU for sepsis 2/2 PNA, LATOYA and metabolic acidosis       Problem/Plan - 1:  ·  Problem: Sepsis with acute hypoxic respiratory failure.   ·  Plan: Likely 2/2 aspiration PNA  - CT Chest: Bilateral pulmonary ground glass opacities   - S/p Vanc,  Cefepime, Azithromycin in ED.   - Continue IV Zosyn   - continue bactroban  5 days bid   - CT A/P - concerning for colitis but no clinical sign /symptom of acute colitis   - Cont supp O2 to maintain O2 sat >92%  - Blood Cx (prelim - NGTD), Urine Cx (final - no growth)  - ID consulted, Dr. Manning, appreciate recs     Problem/Plan - 2:  ·  Problem: LATOYA (acute kidney injury).   ·  Plan: Baseline 0.6-0.8, per chart review  - Cr 1.5 on admission and worsening   - dced Bicarb gtt for metabolic acidosis in ICU - now resolved  - avoid nephrotoxic meds.  - Cr improving  -Nephrology consulted, appreciate recs  - Patient hypokalemic, will aggressively repeat after discussing with Cardio     Problem/Plan - 3:  ·  Problem: Chronic atrial fibrillation.   ·  Plan: Patient with chronic atrial fibrillation  - Tachycardic on admission, progressed to Afib with RVR likely 2/2 dehydration, hypotension, acute illness  - s/p Cardizem IVP 5mg on admission with improved rates  - restarted eliquis (renally dosed) given Hgb improvement and no active signs of bleed.   - restarted diltiazem at lower dose 30 q6hr sec to soft bp- increase to 60mg q6h as BP elevated  -  TTE- Left ventricular systolic function is normal with an ejection fraction visually estimated at 60 to 65 %.  - Cardio following dr kemp.     Problem/Plan - 4:  ·  Problem: Acute colitis.   ·  Plan: CT A/P: Colitis of distal sigmoid colon  - Diarrhea likely 2/2 inflammation vs infectious colitis. Low suspicion for C. diff  - Diet advanced to soft bite sized solid and thin liquids.     Problem/Plan - 5:  ·  Problem: Chronic generalized pain.   ·  Plan: Likely 2/2 deconditioning/immobility   - On home gabapentin - hold.     Problem/Plan - 6:  ·  Problem: Type 2 diabetes mellitus with lactic acidosis.   ·  Plan: Well controlled, on home Metformin 1000mg BID. Not on home insulin  - P/w lactic acidosis in setting of acute illness, likely component of Metformin side effect as well  - A1C: 5.5 in Jun 2023  - Low ISS Regular finger sticks  - advanced diet to soft bite sized solid with thin liquids.     Problem/Plan - 7:  ·  Problem: Hypertension.   ·  Plan: Home regimen: Losartan, Hydralazine  - Hold home Losartan and Hydralazine given hypotension/septic shock  - dced levophed gtt in ICU  - restarted diltiazem- now on 60mg Q6 (on cardizem CD 240mg daily at home)  - wean off IV steroids (now on solucortef 50mg Q12H).     Problem/Plan - 8:  ·  Problem: Hyperlipidemia.   ·  Plan: - Continue statin.     Problem/Plan - 9:  ·  Problem: Need for prophylactic measure.   ·  Plan: DVT ppx: started Eliquis - as pt have hx of  dvt.

## 2023-08-18 NOTE — CONSULT NOTE ADULT - ASSESSMENT
72 y/o M with PMHx DM2, HTN, HLD A-fib (on Eliquis), TBI (s/p SDH with EVAC on 9/2021), and traumatic subdural hemorrhage in 10/2022, BIBEMS with nausea and vomiting. Admitted to ICU for sepsis 2/2 PNA, LATOYA and metabolic acidosis    Sepsis 2/2 Aspiration PNA vs. Acute Colitis  - CT Chest: Bilateral pulmonary ground glass opacities   - CT A/P: Colitis of distal sigmoid colon  - BCx NGTD  - UCx NGTD  - S/p Vanc, Cefepime, Azithromycin in ED --> on zosyn (8/13-)    Recommendations:   C/w IV zosyn, plan x7 day course until 8/19  Trend temps/WBC  Trend renal fxn  Additional care per primary team    Dr. Perea covering weekend service 8/19-8/20  I will resume care 8/21  Infectious Diseases will continue to follow. Please call with any questions.   Hafsa Bhardwaj M.D.  [facility] [phone]             74 y/o M with PMHx DM2, HTN, HLD A-fib (on Eliquis), TBI (s/p SDH with EVAC on 9/2021), and traumatic subdural hemorrhage in 10/2022, BIBEMS with nausea and vomiting. Admitted to ICU for sepsis 2/2 PNA, LATOYA and metabolic acidosis    Sepsis 2/2 Aspiration PNA vs. Acute Colitis  - CT Chest: Bilateral pulmonary ground glass opacities   - CT A/P: Colitis of distal sigmoid colon  - BCx NGTD  - UCx NGTD  - S/p Vanc, Cefepime, Azithromycin in ED --> on zosyn (8/13-)    Recommendations:   C/w IV zosyn, plan x7 day course until 8/19  Trend temps/WBC  Trend renal fxn  Additional care per primary team    Dr. Boggs covering weekend service 8/19-8/20  I will resume care 8/21  Infectious Diseases will continue to follow. Please call with any questions.   Hafsa Bhardwaj M.D.  [facility] [phone]

## 2023-08-18 NOTE — DISCHARGE NOTE PROVIDER - CARE PROVIDER_API CALL
Mirta Goldsmith  Cardiology  [address] [place] [address]  Phone: [phone]  Fax: [phone]  Follow Up Time:

## 2023-08-18 NOTE — DISCHARGE NOTE PROVIDER - HOSPITAL COURSE
ADMISSION H+P:    HPI:  72 y/o M with PMHx DM2, HTN, HLD A-fib (on Eliquis), TBI (s/p SDH with EVAC on 9/2021), and traumatic subdural hemorrhage in 10/2022, BIBEMS with hypoxia, nausea and vomiting. Patient was constipated for almost 2 weeks before wife gave him Miralax. He subsequently developed diarrhea for several days. This afternoon patient vomited and was more lethargic than normal. The home health aide was caring for the patient until the wife returned around 5:30pm and found him lying down and difficult to arouse. The wife called EMS. Currently, patient reports SOB, diffuse pain, and diarrhea. He is mostly non-verbal but is able to respond to yes/no questions when coached or with eye-blinking ques. The wife denies any sick contacts or medication changes. No BMs since admission.    Vitals: T 98.7F, HR 78 --> 131 (Afib), /63 --> 76/52, RR 16, SpO2 98% NRB  Labs: WBC 45, Lactate 14, CO2 11, BUN 31, Cr 1.5, Glucose 66, GFR 49, pro-BNP 1654  VBG: pH 7.2, pCO2 23, pO2 129, HCO3 9  EKG: Afib @ 98bpm, RVH    CT Head:   Persistent posterior parafalcine subdural hematoma identified measuring   approximately 4 mm in thickness, without interval change as compared to   prior exam from 6 hours ago. No new mass effect, intraventricular   extension, or new sites of intracranial hemorrhage identified.    Previously suspected subarachnoid hemorrhage at the right frontal high   convexity is favored to represent streak artifact.    Multifocal chronic infarcts present, superimposed upon stable background   white matter hypoattenuation.    CT A/P:   Bilateral pulmonary groundglass opacities likely reflects atypical   pneumonia.  Thickening versus underdistention of the distal sigmoid colon. Correlate   for symptoms of colitis.     (13 Aug 2023 04:03)      ---  HOSPITAL COURSE: Patient was admitted to ICU for sepsis pneumonia, bin, and metabolic acidosis, downgraded to GMF. ID (Dr. Bhardwaj) followed patient for Sepsis 2/2 Aspiration PNA vs. Acute Colitis. Patient completed course of Zosyn. Nephro (Dr. Castellano) followed. Patient had low K levels. Discussed with Nephro, patient was cleared for discharge on oral supplementation.     Patient was medically optimized and improved clinically throughout hospital course. Patient seen and examined on day of discharge.    Vital Signs  T(C): 36.7 (18 Aug 2023 12:28), Max: 36.7 (18 Aug 2023 12:28)  T(F): 98 (18 Aug 2023 12:28), Max: 98 (18 Aug 2023 12:28)  HR: 73 (18 Aug 2023 14:23) (68 - 80)  BP: 164/93 (18 Aug 2023 14:23) (159/83 - 198/110)  RR: 18 (18 Aug 2023 12:28) (16 - 18)  SpO2: 98% (18 Aug 2023 07:54) (96% - 98%)    Physical Exam:  General: Lying in bed, NAD  HEENT: normocephalic, atraumatic, EOMI, moist mucous membranes   Neck: supple, non-tender, no masses  Neurology: awake, alert  Respiratory: clear to auscultation bilaterally; no wheezes, rhonchi, or rales  CV: regular rate and rhythm, soft S1/S2, no murmurs, rubs, or gallops  Abdominal: soft, non-tender, non-distended, bowel sounds present  Extremities: no clubbing, cyanosis, or edema; palpable peripheral pulses  Musculoskeletal: no joint erythema or warmth, no joint swelling   Skin: warm, dry, normal color    Patient is medically stable for discharge with outpatient follow up.  ---  CONSULTANTS:   Nephro: Dr. Castellano    ---  TIME SPENT:   I, the attending physician, was physically present for the key portions of the evaluation and management (E/M) service provided. The total amount of time spent reviewing the hospital course, laboratory values, imaging findings, assessing/counseling the patient, discussing with consultant physicians, social work, nursing staff was 35 minutes.     ---  FINAL DISCHARGE DIAGNOSIS LIST:  Please see last daily progress note for final discharge diagnoses         ADMISSION H+P:    HPI:  72 y/o M with PMHx DM2, HTN, HLD A-fib (on Eliquis), TBI (s/p SDH with EVAC on 9/2021), and traumatic subdural hemorrhage in 10/2022, BIBEMS with hypoxia, nausea and vomiting. Patient was constipated for almost 2 weeks before wife gave him Miralax. He subsequently developed diarrhea for several days. This afternoon patient vomited and was more lethargic than normal. The home health aide was caring for the patient until the wife returned around 5:30pm and found him lying down and difficult to arouse. The wife called EMS. Currently, patient reports SOB, diffuse pain, and diarrhea. He is mostly non-verbal but is able to respond to yes/no questions when coached or with eye-blinking ques. The wife denies any sick contacts or medication changes. No BMs since admission.    Vitals: T 98.7F, HR 78 --> 131 (Afib), /63 --> 76/52, RR 16, SpO2 98% NRB  Labs: WBC 45, Lactate 14, CO2 11, BUN 31, Cr 1.5, Glucose 66, GFR 49, pro-BNP 1654  VBG: pH 7.2, pCO2 23, pO2 129, HCO3 9  EKG: Afib @ 98bpm, RVH    CT Head:   Persistent posterior parafalcine subdural hematoma identified measuring   approximately 4 mm in thickness, without interval change as compared to   prior exam from 6 hours ago. No new mass effect, intraventricular   extension, or new sites of intracranial hemorrhage identified.    Previously suspected subarachnoid hemorrhage at the right frontal high   convexity is favored to represent streak artifact.    Multifocal chronic infarcts present, superimposed upon stable background   white matter hypoattenuation.    CT A/P:   Bilateral pulmonary groundglass opacities likely reflects atypical   pneumonia.  Thickening versus underdistention of the distal sigmoid colon. Correlate   for symptoms of colitis.     (13 Aug 2023 04:03)      ---  HOSPITAL COURSE: Patient was admitted to ICU for sepsis pneumonia, bin, and metabolic acidosis, downgraded to GMF. ID (Dr. Bhardwaj) followed patient for Sepsis 2/2 Aspiration PNA vs. Acute Colitis. Patient completed course of Zosyn. Nephro (Dr. Castellano) followed. Patient had low K levels. Discussed with Nephro, patient was cleared for discharge on oral supplementation.     Patient was medically optimized and improved clinically throughout hospital course. Patient seen and examined on day of discharge.    Vital Signs  Vital Signs (24 Hrs):  T(C): 36.4 (08-19-23 @ 04:39), Max: 36.7 (08-18-23 @ 12:28)  HR: 74 (08-19-23 @ 09:35) (66 - 85)  BP: 120/64 (08-19-23 @ 05:00) (120/64 - 182/92)  RR: 18 (08-19-23 @ 04:39) (18 - 18)  SpO2: 96% (08-19-23 @ 09:35) (96% - 98%)    Physical Exam:  General: Lying in bed, NAD  HEENT: normocephalic, atraumatic, EOMI, moist mucous membranes   Neck: supple, non-tender, no masses  Neurology: awake, alert  Respiratory: clear to auscultation bilaterally; no wheezes, rhonchi, or rales  CV: regular rate and rhythm, soft S1/S2, no murmurs, rubs, or gallops  Abdominal: soft, non-tender, non-distended, bowel sounds present  Extremities: no clubbing, cyanosis, or edema; palpable peripheral pulses  Musculoskeletal: no joint erythema or warmth, no joint swelling   Skin: warm, dry, normal color    Patient is medically stable for discharge with outpatient follow up.  ---  CONSULTANTS:   Nephro: Dr. Castellano    ---  TIME SPENT:   I, the attending physician, was physically present for the key portions of the evaluation and management (E/M) service provided. The total amount of time spent reviewing the hospital course, laboratory values, imaging findings, assessing/counseling the patient, discussing with consultant physicians, social work, nursing staff was 35 minutes.     ---  FINAL DISCHARGE DIAGNOSIS LIST:  Please see last daily progress note for final discharge diagnoses         ADMISSION H+P:    HPI:  74 y/o M with PMHx DM2, HTN, HLD A-fib (on Eliquis), TBI (s/p SDH with EVAC on 9/2021), and traumatic subdural hemorrhage in 10/2022, BIBEMS with hypoxia, nausea and vomiting. Patient was constipated for almost 2 weeks before wife gave him Miralax. He subsequently developed diarrhea for several days. This afternoon patient vomited and was more lethargic than normal. The home health aide was caring for the patient until the wife returned around 5:30pm and found him lying down and difficult to arouse. The wife called EMS. Currently, patient reports SOB, diffuse pain, and diarrhea. He is mostly non-verbal but is able to respond to yes/no questions when coached or with eye-blinking ques. The wife denies any sick contacts or medication changes. No BMs since admission.    Vitals: T 98.7F, HR 78 --> 131 (Afib), /63 --> 76/52, RR 16, SpO2 98% NRB  Labs: WBC 45, Lactate 14, CO2 11, BUN 31, Cr 1.5, Glucose 66, GFR 49, pro-BNP 1654  VBG: pH 7.2, pCO2 23, pO2 129, HCO3 9  EKG: Afib @ 98bpm, RVH    CT Head:   Persistent posterior parafalcine subdural hematoma identified measuring   approximately 4 mm in thickness, without interval change as compared to   prior exam from 6 hours ago. No new mass effect, intraventricular   extension, or new sites of intracranial hemorrhage identified.    Previously suspected subarachnoid hemorrhage at the right frontal high   convexity is favored to represent streak artifact.    Multifocal chronic infarcts present, superimposed upon stable background   white matter hypoattenuation.    CT A/P:   Bilateral pulmonary groundglass opacities likely reflects atypical   pneumonia.  Thickening versus underdistention of the distal sigmoid colon. Correlate   for symptoms of colitis.     (13 Aug 2023 04:03)      ---  HOSPITAL COURSE: Patient was admitted to ICU for sepsis pneumonia, bin, and metabolic acidosis, downgraded to GMF. ID (Dr. Bhardwaj) followed patient for Sepsis 2/2 Aspiration PNA vs. Acute Colitis. Patient completed course of Zosyn. Nephro (Dr. Castellano) followed. Patient had low K levels. Discussed with Nephro, patient was cleared for discharge on oral supplementation.     Patient was medically optimized and improved clinically throughout hospital course. Patient seen and examined on day of discharge.    Vital Signs  T(C): 36.3 (08-20-23 @ 04:47), Max: 36.8 (08-19-23 @ 21:38)  HR: 80 (08-20-23 @ 08:02) (70 - 84)  BP: 166/97 (08-20-23 @ 04:47) (154/83 - 195/100)  RR: 19 (08-20-23 @ 04:47) (18 - 19)  SpO2: 96% (08-20-23 @ 08:02) (95% - 97%)    Physical Exam:  General: Lying in bed, NAD  HEENT: normocephalic, atraumatic, EOMI, moist mucous membranes   Neck: supple, non-tender, no masses  Neurology: awake, alert  Respiratory: clear to auscultation bilaterally; no wheezes, rhonchi, or rales  CV: regular rate and rhythm, soft S1/S2, no murmurs, rubs, or gallops  Abdominal: soft, non-tender, non-distended, bowel sounds present  Extremities: no clubbing, cyanosis, or edema; palpable peripheral pulses  Musculoskeletal: no joint erythema or warmth, no joint swelling   Skin: warm, dry, normal color    Patient is medically stable for discharge with outpatient follow up.  ---  CONSULTANTS:   Nephro: Dr. Castellano    ---  TIME SPENT:   I, the attending physician, was physically present for the key portions of the evaluation and management (E/M) service provided. The total amount of time spent reviewing the hospital course, laboratory values, imaging findings, assessing/counseling the patient, discussing with consultant physicians, social work, nursing staff was 35 minutes.     ---  FINAL DISCHARGE DIAGNOSIS LIST:  Please see last daily progress note for final discharge diagnoses

## 2023-08-18 NOTE — PROGRESS NOTE ADULT - SUBJECTIVE AND OBJECTIVE BOX
HPI:  Pt seen and examined at bedside. He is mostly non-verbal but is able to respond to yes/no questions when coached or with eye-blinking. Minimal success with communication today. Pt had a Cr. of 1.5-2 recently during his time in the ICU, downgraded to floors yesterday. Cr. has been slowly decreasing to 1.3 now with baseline at .6-.8. Potassium has remained <3 in the past 24 hours with multiple attempts to replete yesterday including 40mEq x 3 and a 10mEq IVPB x3 yesterday. Scheduled for 40mEq x3 today. Pt has had all of his medications renally dosed since admission to the ICU. Denies current cp, difficulty breathing, abd pain.    Vitals: T 98.7F, HR 78 --> 131 (Afib), /63 --> 76/52, RR 16, SpO2 98% NRB  Labs: WBC 45, Lactate 14, CO2 11, BUN 31, Cr 1.5, Glucose 66, GFR 49, pro-BNP 1654  VBG: pH 7.2, pCO2 23, pO2 129, HCO3 9  EKG: Afib @ 98bpm, RVH    CT Head:   Persistent posterior parafalcine subdural hematoma identified measuring   approximately 4 mm in thickness, without interval change as compared to   prior exam from 6 hours ago. No new mass effect, intraventricular   extension, or new sites of intracranial hemorrhage identified.    Previously suspected subarachnoid hemorrhage at the right frontal high   convexity is favored to represent streak artifact.    Multifocal chronic infarcts present, superimposed upon stable background   white matter hypoattenuation.    CT A/P:   Bilateral pulmonary groundglass opacities likely reflects atypical   pneumonia.  Thickening versus underdistention of the distal sigmoid colon. Correlate   for symptoms of colitis.     (13 Aug 2023 04:03)       PAST MEDICAL & SURGICAL HISTORY:  TBI (traumatic brain injury)      HTN (hypertension)      Atrial fibrillation      Peripheral neuropathy      Major depression      HLD (hyperlipidemia)      CAD (coronary artery disease)      BPH (benign prostatic hyperplasia)      Pneumonia due to COVID-19 virus  June 2022      Hemorrhage in the brain      H/O craniotomy         FAMILY HISTORY:  FH: HTN (hypertension) (Father, Mother, Sibling)    Family history of bone cancer (Sibling)    FH: Alzheimers disease (Sibling)    NC    Social History:Non smoker    MEDICATIONS  (STANDING):  apixaban 2.5 milliGRAM(s) Oral every 12 hours  atorvastatin 40 milliGRAM(s) Oral at bedtime  chlorhexidine 2% Cloths 1 Application(s) Topical every 24 hours  dextrose 5%. 1000 milliLiter(s) (100 mL/Hr) IV Continuous <Continuous>  dextrose 5%. 1000 milliLiter(s) (50 mL/Hr) IV Continuous <Continuous>  dextrose 50% Injectable 25 Gram(s) IV Push once  dextrose 50% Injectable 12.5 Gram(s) IV Push once  dextrose 50% Injectable 25 Gram(s) IV Push once  diltiazem    Tablet 60 milliGRAM(s) Oral every 6 hours  glucagon  Injectable 1 milliGRAM(s) IntraMuscular once  hydrocortisone sodium succinate Injectable 50 milliGRAM(s) IV Push every 8 hours  insulin lispro (ADMELOG) corrective regimen sliding scale   SubCutaneous at bedtime  insulin lispro (ADMELOG) corrective regimen sliding scale   SubCutaneous three times a day before meals  mirtazapine 7.5 milliGRAM(s) Oral daily  mupirocin 2% Nasal 1 Application(s) Both Nostrils every 12 hours  pantoprazole  Injectable 40 milliGRAM(s) IV Push daily  piperacillin/tazobactam IVPB.. 3.375 Gram(s) IV Intermittent every 8 hours  potassium chloride   Powder 40 milliEquivalent(s) Oral every 4 hours  sodium chloride 3%  Inhalation 4 milliLiter(s) Inhalation every 12 hours    MEDICATIONS  (PRN):  dextrose Oral Gel 15 Gram(s) Oral once PRN Blood Glucose LESS THAN 70 milliGRAM(s)/deciliter  sodium chloride 0.9% lock flush 10 milliLiter(s) IV Push every 1 hour PRN Pre/post blood products, medications, blood draw, and to maintain line patency   Meds reviewed    Allergies    No Known Allergies    Intolerances         REVIEW OF SYSTEMS:    Review of Systems difficult to obtain via communication difficulty:   Constitutional: Denies fatigue  HEENT: Denies headaches   Respiratory: denies SOB, cough, or wheezing  Cardiovascular: denies CP, palpitations  Gastrointestinal: Denies nausea, denies vomiting, diarrhea, constipation, abdominal pain, or bloody stools  Genitourinary: denies bloody urine      Vital Signs Last 24 Hrs  T(C): 36.7 (18 Aug 2023 12:28), Max: 36.7 (18 Aug 2023 12:28)  T(F): 98 (18 Aug 2023 12:28), Max: 98 (18 Aug 2023 12:28)  HR: 73 (18 Aug 2023 14:23) (68 - 80)  BP: 164/93 (18 Aug 2023 14:23) (159/83 - 198/110)  BP(mean): --  RR: 18 (18 Aug 2023 12:28) (16 - 18)  SpO2: 98% (18 Aug 2023 07:54) (96% - 98%)    Parameters below as of 18 Aug 2023 07:54  Patient On (Oxygen Delivery Method): room air        PHYSICAL EXAM:    GENERAL: NAD  HEAD:  Atraumatic, Normocephalic, Old scar well healed s/p craniotomy  EYES: EOMI, conjunctiva and sclera clear  ENMT: No Drainage from nares, No drainage from ears  NECK: Supple, neck  veins full  NERVOUS SYSTEM:  Awake and Alert  CHEST/LUNG: Clear to auscultation bilaterally; No rales, rhonchi, wheezing, or rubs  HEART: Irregular rhythm; No murmurs, rubs, or gallops  ABDOMEN: Soft, Nontender, Nondistended;   EXTREMITIES:  No peripheral edema      LABS:                        10.3   9.91  )-----------( 220      ( 18 Aug 2023 07:24 )             32.0     08-18    147<H>  |  111<H>  |  25<H>  ----------------------------<  179<H>  3.0<L>   |  27  |  1.10    Ca    8.6      18 Aug 2023 07:24  Phos  2.8     08-18  Mg     1.8     08-18    TPro  6.3  /  Alb  3.3  /  TBili  0.8  /  DBili  x   /  AST  15  /  ALT  26  /  AlkPhos  69  08-18      Urinalysis Basic - ( 18 Aug 2023 07:24 )    Color: x / Appearance: x / SG: x / pH: x  Gluc: 179 mg/dL / Ketone: x  / Bili: x / Urobili: x   Blood: x / Protein: x / Nitrite: x   Leuk Esterase: x / RBC: x / WBC x   Sq Epi: x / Non Sq Epi: x / Bacteria: x

## 2023-08-18 NOTE — PROGRESS NOTE ADULT - SUBJECTIVE AND OBJECTIVE BOX
Patient is a 73y old  Male who presents with a chief complaint of Sepsis (18 Aug 2023 16:51)      INTERVAL HPI/OVERNIGHT EVENTS: Patient seen and examined at bedside.  Patient nonverbal at baseline. Unable to obtain comprehensive ROS and HPI 2/2 mental status.    MEDICATIONS  (STANDING):  apixaban 2.5 milliGRAM(s) Oral every 12 hours  atorvastatin 40 milliGRAM(s) Oral at bedtime  chlorhexidine 2% Cloths 1 Application(s) Topical every 24 hours  dextrose 5%. 1000 milliLiter(s) (100 mL/Hr) IV Continuous <Continuous>  dextrose 5%. 1000 milliLiter(s) (50 mL/Hr) IV Continuous <Continuous>  dextrose 50% Injectable 25 Gram(s) IV Push once  dextrose 50% Injectable 25 Gram(s) IV Push once  dextrose 50% Injectable 12.5 Gram(s) IV Push once  diltiazem    Tablet 60 milliGRAM(s) Oral every 6 hours  glucagon  Injectable 1 milliGRAM(s) IntraMuscular once  hydrALAZINE 25 milliGRAM(s) Oral three times a day  insulin lispro (ADMELOG) corrective regimen sliding scale   SubCutaneous three times a day before meals  insulin lispro (ADMELOG) corrective regimen sliding scale   SubCutaneous at bedtime  mirtazapine 7.5 milliGRAM(s) Oral daily  mupirocin 2% Nasal 1 Application(s) Both Nostrils every 12 hours  pantoprazole  Injectable 40 milliGRAM(s) IV Push daily  piperacillin/tazobactam IVPB.. 3.375 Gram(s) IV Intermittent every 8 hours  potassium chloride  10 mEq/100 mL IVPB 10 milliEquivalent(s) IV Intermittent every 1 hour  sodium chloride 0.45%. 1000 milliLiter(s) (75 mL/Hr) IV Continuous <Continuous>  sodium chloride 0.45%. 1000 milliLiter(s) (75 mL/Hr) IV Continuous <Continuous>  sodium chloride 3%  Inhalation 4 milliLiter(s) Inhalation every 12 hours    MEDICATIONS  (PRN):  dextrose Oral Gel 15 Gram(s) Oral once PRN Blood Glucose LESS THAN 70 milliGRAM(s)/deciliter  sodium chloride 0.9% lock flush 10 milliLiter(s) IV Push every 1 hour PRN Pre/post blood products, medications, blood draw, and to maintain line patency      Allergies    No Known Allergies    Intolerances      Vital Signs Last 24 Hrs  T(C): 36.7 (18 Aug 2023 12:28), Max: 36.7 (18 Aug 2023 12:28)  T(F): 98 (18 Aug 2023 12:28), Max: 98 (18 Aug 2023 12:28)  HR: 73 (18 Aug 2023 14:23) (68 - 80)  BP: 164/93 (18 Aug 2023 14:23) (159/83 - 198/110)  BP(mean): --  RR: 18 (18 Aug 2023 12:28) (16 - 18)  SpO2: 98% (18 Aug 2023 07:54) (96% - 98%)    Parameters below as of 18 Aug 2023 07:54  Patient On (Oxygen Delivery Method): room air        PHYSICAL EXAM:  GENERAL: NAD  HEENT:  anicteric, moist mucous membranes  CHEST/LUNG:  CTA b/l, no rales, wheezes, or rhonchi  HEART:  RRR, S1, S2  ABDOMEN:  BS+, soft, nontender, nondistended  EXTREMITIES: no edema, cyanosis, or calf tenderness  NERVOUS SYSTEM: awake, nonverbal    LABS:                        10.3   9.91  )-----------( 220      ( 18 Aug 2023 07:24 )             32.0     CBC Full  -  ( 18 Aug 2023 07:24 )  WBC Count : 9.91 K/uL  Hemoglobin : 10.3 g/dL  Hematocrit : 32.0 %  Platelet Count - Automated : 220 K/uL  Mean Cell Volume : 76.2 fl  Mean Cell Hemoglobin : 24.5 pg  Mean Cell Hemoglobin Concentration : 32.2 gm/dL  Auto Neutrophil # : x  Auto Lymphocyte # : x  Auto Monocyte # : x  Auto Eosinophil # : x  Auto Basophil # : x  Auto Neutrophil % : x  Auto Lymphocyte % : x  Auto Monocyte % : x  Auto Eosinophil % : x  Auto Basophil % : x    18 Aug 2023 07:24    147    |  111    |  25     ----------------------------<  179    3.0     |  27     |  1.10     Ca    8.6        18 Aug 2023 07:24  Phos  2.8       18 Aug 2023 07:24  Mg     1.8       18 Aug 2023 07:24    TPro  6.3    /  Alb  3.3    /  TBili  0.8    /  DBili  x      /  AST  15     /  ALT  26     /  AlkPhos  69     18 Aug 2023 07:24      Urinalysis Basic - ( 18 Aug 2023 07:24 )    Color: x / Appearance: x / SG: x / pH: x  Gluc: 179 mg/dL / Ketone: x  / Bili: x / Urobili: x   Blood: x / Protein: x / Nitrite: x   Leuk Esterase: x / RBC: x / WBC x   Sq Epi: x / Non Sq Epi: x / Bacteria: x      CAPILLARY BLOOD GLUCOSE      POCT Blood Glucose.: 168 mg/dL (18 Aug 2023 16:39)  POCT Blood Glucose.: 241 mg/dL (18 Aug 2023 11:31)  POCT Blood Glucose.: 180 mg/dL (18 Aug 2023 07:43)  POCT Blood Glucose.: 257 mg/dL (17 Aug 2023 21:09)        Culture - Urine (collected 08-13-23 @ 09:37)  Source: Catheterized Catheterized  Final Report (08-14-23 @ 12:34):    No growth    Culture - Blood (collected 08-12-23 @ 23:47)  Source: .Blood Blood-Peripheral  Final Report (08-18-23 @ 07:00):    No growth at 5 days    Culture - Blood (collected 08-12-23 @ 23:40)  Source: .Blood Blood-Peripheral  Final Report (08-18-23 @ 07:00):    No growth at 5 days        RADIOLOGY & ADDITIONAL TESTS:    Personally reviewed.     Consultant(s) Notes Reviewed:  [x] YES  [ ] NO

## 2023-08-18 NOTE — CASE MANAGEMENT PROGRESS NOTE - NSCMPROGRESSNOTE_GEN_ALL_CORE
Patient's potassium remains low, 3.0 today. B/ppd pressure was high overnight. As per Dr Bass Plan for transition home cancelled. All parties notified to the cancellation is Wife Lorna Mcmahan from [facility] [phone],  [facility]  [phone], fax Number [phone] and [facility] [phone]. Transportation change for 4pm tomorrow if patient is stable for transition.

## 2023-08-18 NOTE — CONSULT NOTE ADULT - SUBJECTIVE AND OBJECTIVE BOX
Full note to follow  Please call with any questions.   Hafsa Bhardwaj M.D.  [facility] [phone]   [facility], Division of [facility]  LADONNA Ramirez, KAPIL Bhardwaj, NAKIA [facility]  [phone]    MELBA PARMAR  73y, Male  [id number]    HPI--  HPI:  72 y/o M with PMHx DM2, HTN, HLD A-fib (on Eliquis), TBI (s/p SDH with EVAC on 9/2021), and traumatic subdural hemorrhage in 10/2022, BIBEMS with hypoxia, nausea and vomiting. Patient was constipated for almost 2 weeks before wife gave him Miralax. He subsequently developed diarrhea for several days. This afternoon patient vomited and was more lethargic than normal. The home health aide was caring for the patient until the wife returned around 5:30pm and found him lying down and difficult to arouse. The wife called EMS. Currently, patient reports SOB, diffuse pain, and diarrhea. He is mostly non-verbal but is able to respond to yes/no questions when coached or with eye-blinking ques. The wife denies any sick contacts or medication changes. No BMs since admission.    Vitals: T 98.7F, HR 78 --> 131 (Afib), /63 --> 76/52, RR 16, SpO2 98% NRB  Labs: WBC 45, Lactate 14, CO2 11, BUN 31, Cr 1.5, Glucose 66, GFR 49, pro-BNP 1654  VBG: pH 7.2, pCO2 23, pO2 129, HCO3 9  EKG: Afib @ 98bpm, RVH    CT Head:   Persistent posterior parafalcine subdural hematoma identified measuring   approximately 4 mm in thickness, without interval change as compared to   prior exam from 6 hours ago. No new mass effect, intraventricular   extension, or new sites of intracranial hemorrhage identified.    Previously suspected subarachnoid hemorrhage at the right frontal high   convexity is favored to represent streak artifact.    Multifocal chronic infarcts present, superimposed upon stable background   white matter hypoattenuation.    CT A/P:   Bilateral pulmonary groundglass opacities likely reflects atypical   pneumonia.  Thickening versus underdistention of the distal sigmoid colon. Correlate   for symptoms of colitis.     (13 Aug 2023 04:03)    Admitted to ICU for sepsis 2/2 PNA, LATOYA and metabolic acidosis, txfered to floor on 8/14    Pt seen at bedside  Hx obtained from above      Active Medications--  apixaban 2.5 milliGRAM(s) Oral every 12 hours  atorvastatin 40 milliGRAM(s) Oral at bedtime  chlorhexidine 2% Cloths 1 Application(s) Topical every 24 hours  dextrose 5%. 1000 milliLiter(s) IV Continuous <Continuous>  dextrose 5%. 1000 milliLiter(s) IV Continuous <Continuous>  dextrose 50% Injectable 25 Gram(s) IV Push once  dextrose 50% Injectable 12.5 Gram(s) IV Push once  dextrose 50% Injectable 25 Gram(s) IV Push once  dextrose Oral Gel 15 Gram(s) Oral once PRN  diltiazem    Tablet 60 milliGRAM(s) Oral every 6 hours  glucagon  Injectable 1 milliGRAM(s) IntraMuscular once  hydrALAZINE 25 milliGRAM(s) Oral three times a day  hydrocortisone sodium succinate Injectable 50 milliGRAM(s) IV Push every 12 hours  insulin lispro (ADMELOG) corrective regimen sliding scale   SubCutaneous three times a day before meals  insulin lispro (ADMELOG) corrective regimen sliding scale   SubCutaneous at bedtime  mirtazapine 7.5 milliGRAM(s) Oral daily  mupirocin 2% Nasal 1 Application(s) Both Nostrils every 12 hours  pantoprazole  Injectable 40 milliGRAM(s) IV Push daily  piperacillin/tazobactam IVPB.. 3.375 Gram(s) IV Intermittent every 8 hours  potassium chloride  10 mEq/100 mL IVPB 10 milliEquivalent(s) IV Intermittent every 1 hour  sodium chloride 0.45%. 1000 milliLiter(s) IV Continuous <Continuous>  sodium chloride 0.9% lock flush 10 milliLiter(s) IV Push every 1 hour PRN  sodium chloride 3%  Inhalation 4 milliLiter(s) Inhalation every 12 hours    Antimicrobials:   piperacillin/tazobactam IVPB.. 3.375 Gram(s) IV Intermittent every 8 hours    Immunologic:     ROS:  unable to obtain    Allergies: No Known Allergies    PMH -- TBI (traumatic brain injury)    HTN (hypertension)    Atrial fibrillation    DM (diabetes mellitus)    Peripheral neuropathy    Major depression    HLD (hyperlipidemia)    CAD (coronary artery disease)    BPH (benign prostatic hyperplasia)    Pneumonia due to COVID-19 virus    Hemorrhage in the brain      PSH -- No significant past surgical history    No significant past surgical history    H/O craniotomy      FH -- No pertinent family history in first degree relatives    FH: HTN (hypertension) (Father, Mother, Sibling)    Family history of bone cancer (Sibling)    FH: Alzheimers disease (Sibling)      Social History --  EtOH: denies   Tobacco: denies   Drug Use: denies     Travel/Environmental/Occupational History:    Physical Exam--  Vital Signs Last 24 Hrs  T(F): 97.4 (18 Aug 2023 04:22), Max: 97.7 (17 Aug 2023 20:25)  HR: 74 (18 Aug 2023 07:54) (68 - 75)  BP: 172/74 (18 Aug 2023 05:45) (159/83 - 198/110)  RR: 16 (18 Aug 2023 04:22) (16 - 17)  SpO2: 98% (18 Aug 2023 07:54) (96% - 98%)  General: nontoxic-appearing, no acute distress  HEENT: NC/AT, EOMI,   Lungs: Clear bilaterally without rales, wheezing or rhonchi  Heart: Regular rate and rhythm. No murmur, rub or gallop.  Abdomen: Soft. Nondistended. Nontender.   Extremities: No cyanosis or clubbing. No edema.   Skin: Warm. Dry. Frail    Laboratory & Imaging Data:  CBC:                       10.3   9.91  )-----------( 220      ( 18 Aug 2023 07:24 )             32.0     CMP: 08-18    147<H>  |  111<H>  |  25<H>  ----------------------------<  179<H>  3.0<L>   |  27  |  1.10    Ca    8.6      18 Aug 2023 07:24  Phos  2.8     08-18  Mg     1.8     08-18    TPro  6.3  /  Alb  3.3  /  TBili  0.8  /  DBili  x   /  AST  15  /  ALT  26  /  AlkPhos  69  08-18    LIVER FUNCTIONS - ( 18 Aug 2023 07:24 )  Alb: 3.3 g/dL / Pro: 6.3 g/dL / ALK PHOS: 69 U/L / ALT: 26 U/L / AST: 15 U/L / GGT: x           Urinalysis Basic - ( 18 Aug 2023 07:24 )    Color: x / Appearance: x / SG: x / pH: x  Gluc: 179 mg/dL / Ketone: x  / Bili: x / Urobili: x   Blood: x / Protein: x / Nitrite: x   Leuk Esterase: x / RBC: x / WBC x   Sq Epi: x / Non Sq Epi: x / Bacteria: x        Microbiology: reviewed    Culture - Urine (collected 08-13-23 @ 09:37)  Source: Catheterized Catheterized  Final Report (08-14-23 @ 12:34):    No growth    Culture - Blood (collected 08-12-23 @ 23:47)  Source: .Blood Blood-Peripheral  Final Report (08-18-23 @ 07:00):    No growth at 5 days    Culture - Blood (collected 08-12-23 @ 23:40)  Source: .Blood Blood-Peripheral  Final Report (08-18-23 @ 07:00):    No growth at 5 days          Radiology: reviewed    < from: CT Head No Cont (08.13.23 @ 02:02) >    ACC: [id number] EXAM:  CT BRAIN   ORDERED BY: SUSU BERMUDEZ     PROCEDURE DATE:  08/13/2023          INTERPRETATION:  CT HEAD    INDICATIONS: Follow-up hemorrhage    TECHNIQUE:  Serial axial images were obtained from the skull base to the vertex   without the use of intravenous contrast.    COMPARISON EXAMINATION: CT head from same 8/12/2023 at 10:15 PM    FINDINGS:  Brain parenchyma/extra-axial compartments: Stable encephalomalacia   involving the anteroinferior right frontal/temporal lobes and left   occipital lobe, and to a lesser extent the paramedian left frontal lobe.   There is layering hyperdensity present along the posterior falx which   measures approximately 4 mm in thickness, slightly decreased in size as   compared to prior exam in June 2023. Redemonstration of streak artifact   at the high right frontal convexity.    The ventricles remain significantly dilated with stable appearance of   periventricular hypodensity is compared to the prior study. The cortical   sulci appear effaced at the vertex with disproportionate enlargement of   the sylvian fissures, unchanged. The basal cisterns are patent.   Craniocervical junction and sella turcica are within normal limits.    Calvarium, paranasal sinuses, and orbits: Evidence ofprior right frontal   craniotomy. Otherwise, the calvarium is intact. Paranasal sinuses and   mastoid air cells are clear. The orbits are within normal limits.      IMPRESSION:  Persistent posterior parafalcine subdural hematoma identified measuring   approximately 4 mm in thickness, without interval change as compared to   prior exam from 6 hours ago. No new mass effect, intraventricular   extension, or new sites of intracranial hemorrhage identified.    Previously suspected subarachnoid hemorrhage at the right frontal high   convexity is favored to represent streak artifact.    Multifocal chronic infarcts present, superimposed upon stable background   white matter hypoattenuation.    Critical findings above related to persistent intracranial hemorrhage   were discussed directly by the ED radiologist on call, Myles Sandhu MD,   with the intensive care unit physician, Dr. Sahni, at 2:30 AM on   8/13/2023.    --- End of Report ---             MYLES SANDHU MD; Attending Radiologist  This document has been electronically signed. Aug 13 2023  2:37AM    < end of copied text >    < from: CT Abdomen and Pelvis No Cont (08.12.23 @ 22:38) >    ACC: [id number] EXAM:  CT ABDOMEN AND PELVIS   ORDERED BY: SHONA VILLANUEVA     ACC: [id number] EXAM:  CT CHEST   ORDERED BY: SHONA VILLANUEVA     PROCEDURE DATE:  08/12/2023          INTERPRETATION:  CLINICAL INFORMATION: Shortness of breath andvomiting.    COMPARISON: CT chest 1/7/2023 and CT abdomen pelvis 12/1/2022    CONTRAST/COMPLICATIONS:  IV Contrast: NONE  Oral Contrast: NONE  Complications: None reported at time of study completion    PROCEDURE:  CT of the Chest, Abdomen and Pelviswas performed.  Sagittal and coronal reformats were performed.    FINDINGS:  CHEST:  LUNGS AND LARGE AIRWAYS: Layering secretions in the distal trachea..   Groundglass opacities in the right upper, right middle, and bilateral   lower lobes.  PLEURA: No pleural effusion.  VESSELS: Atherosclerotic changes of the aorta and coronary arteries.  HEART: Cardiomegaly. Trace pericardial effusion.  MEDIASTINUM AND ALYSSA: No lymphadenopathy.  CHEST WALL AND LOWER NECK: Bilateral gynecomastia.    ABDOMEN AND PELVIS:  LIVER: Within normal limits.  BILE DUCTS: Normal caliber.  GALLBLADDER: Within normal limits.  SPLEEN: Within normal limits.  PANCREAS: Within normal limits.  ADRENALS: Within normal limits.  KIDNEYS/URETERS: Bilateral parapelvic cysts. No nephrolithiasis or   hydronephrosis.    BLADDER: Within normal limits.  REPRODUCTIVE ORGANS: Prostate is enlarged.    BOWEL: Thickening versus underdistention of the distal sigmoid colon.   Moderate fecal load No bowel obstruction. Appendix is normal.  PERITONEUM: No ascites.  VESSELS: Atherosclerotic changes.  RETROPERITONEUM/LYMPH NODES: No lymphadenopathy.  ABDOMINAL WALL: Bilateral fat-containing inguinal hernias.  BONES: Degenerative changes.    IMPRESSION:    Bilateral pulmonary groundglass opacities likely reflects atypical   pneumonia.  Thickening versus underdistention of the distal sigmoid colon. Correlate   for symptoms of colitis.        --- End of Report ---            ALANIS YAP MD; Attending Radiologist  This document has beenelectronically signed. Aug 12 2023 11:43PM    < end of copied text >  < from: CT Head No Cont (08.12.23 @ 22:33) >    ACC: [id number] EXAM:  CT BRAIN   ORDERED BY: SHONA VILLANUEVA     PROCEDURE DATE:  08/12/2023          INTERPRETATION:  CT HEAD    INDICATIONS: altered mental status, lethargy, , PCT    TECHNIQUE:  Serial axial images were obtained from the skull base to the vertex   without the use of intravenous contrast.    COMPARISON EXAMINATION: CT head dated 6/11/2023    FINDINGS:  Brain parenchyma/extra-axial compartments: Stable encephalomalacia   involving the anteroinferior right frontal/temporal lobes and left   occipital lobe, and to a lesser extent the paramedian left frontal lobe.   There is layering hyperdensity present along the posterior falx which   measures approximately 4 mm in thickness, slightly decreased in size as   compared to prior exam in June 2023. Questionable cortical   hyperattenuation at the high right frontal convexity.    The ventricles remain significantly dilated with stable appearance of   periventricular hypodensity is compared to the prior study. The cortical   sulci appear effaced at the vertex with disproportionate enlargement of   the sylvian fissures, unchanged. The basal cisterns are patent.   Craniocervical junction and sella turcica are within normal limits.    Calvarium, paranasal sinuses, and orbits: Evidence of prior right frontal   craniotomy. Otherwise, the calvarium is intact. Paranasal sinuses and   mastoid air cells are clear. The orbits are within normal limits.      IMPRESSION:  Persistent posterior parafalcine subdural hematoma identifiedmeasuring   approximately 4 mm in thickness, slightly decreased in size as compared   to prior exam from 6/11/2023. No intracranial herniation or   intraventricular extension. Short-term interval follow-up head CT   recommended to assess stability.    Questionable subarachnoid hemorrhage at the right frontal high convexity   adjacent to a region of known chronic infarct. Findings may alternatively   represent streak artifact. Close attention on follow-up is advised.    Multifocal stable chronic infarcts.    Critical findings above related to persistent intracranial hemorrhage   were discussed directly by telephone by the ED radiologist on call, Myles Sandhu MD, with the emergency department attending physician, Dr. Villanueva, at 11:40 PM on 8/12/2023.    --- End of Report ---             MYLES SANDHU MD; Attending Radiologist  This document has been electronically signed. Aug 12 2023 11:54PM    < end of copied text >  < from: Xray Chest 1 View- PORTABLE-Urgent (Xray Chest 1 View- PORTABLE-Urgent .) (08.12.23 @ 21:05) >    ACC: [id number] EXAM:  XR CHEST PORTABLE URGENT 1V   ORDERED BY: SHONA VILLANUEVA     PROCEDURE DATE:  08/12/2023          INTERPRETATION:  XR CHEST URGENT dated 8/12/2023 9:05 PM    CLINICAL INFORMATION: Male, 73 years old.  SOB.    PRIOR STUDIES: 6/12/2023    FINDINGS: Heart size, mediastinal and hilar contours are within normal   limits despite the limitations in the portable technique. Lung zones are   clear. Diffuse permeative changes involving the humeri more pronounced on   the right side.    IMPRESSION:  No acute pulmonary pathology.  Diffuse permeative changes involving the humeri right greater than left.   Abbreviated differential includes multiple myeloma. Clinical and   laboratory correlation and further imaging may be of value to include   preliminarily a skeletal survey.    --- End of Report ---            MAURICIO HESS MD; Attending Radiologist  This document has been electronically signed. Aug 13 2023 11:06AM    < end of copied text >

## 2023-08-18 NOTE — DISCHARGE NOTE PROVIDER - NSDCFUSCHEDAPPT_GEN_ALL_CORE_FT
[facility]  WOUNDCARE  [address]  Scheduled Appointment: 08/25/2023    Alicia Abdalla  [facility]  PHYSMED [address]  Scheduled Appointment: 09/14/2023    Mirta Goldsmith  [facility]  CARDIOLOGY [address]  Scheduled Appointment: 10/12/2023    Richard Mathis  [facility]  INTMED [address]  Scheduled Appointment: 11/08/2023     Alicia Abdalla  [facility]  PHYSMED [address]  Scheduled Appointment: 09/14/2023    Mirta Goldsmith  [facility]  [address]OLOGY [address] [facility] [address] [facility] [address]  Scheduled Appointment: 10/12/2023    Richard Mathis  [facility]  INTMED [address]  Scheduled Appointment: 11/08/2023

## 2023-08-18 NOTE — DISCHARGE NOTE PROVIDER - NSDCMRMEDTOKEN_GEN_ALL_CORE_FT
apixaban 2.5 mg oral tablet: 1 tab(s) orally every 12 hours  atorvastatin 40 mg oral tablet: 1 tab(s) orally once a day (at bedtime)  dilTIAZem 60 mg oral tablet: 1 tab(s) orally every 6 hours  gabapentin 400 mg oral tablet: 1 tab(s) orally 3 times a day  hydrALAZINE 25 mg oral tablet: 1 tab(s) orally 3 times a day  latanoprost 0.005% ophthalmic solution: 1 in each eye once a day (at bedtime)  metFORMIN 1000 mg oral tablet: 1 tab(s) orally 2 times a day   mirtazapine 7.5 mg oral tablet: 1 tab(s) orally once a day  modafinil 100 mg oral tablet: 1 orally once a day  mupirocin 2% topical ointment: Apply topically to affected area 2 times a day   apixaban 2.5 mg oral tablet: 1 tab(s) orally every 12 hours  atorvastatin 40 mg oral tablet: 1 tab(s) orally once a day (at bedtime)  dilTIAZem 60 mg oral tablet: 1 tab(s) orally every 6 hours  gabapentin 400 mg oral tablet: 1 tab(s) orally 3 times a day  hydrALAZINE 25 mg oral tablet: 1 tab(s) orally 3 times a day  K-Tab 20 mEq oral tablet, extended release: 2 tab(s) orally 2 times a day  latanoprost 0.005% ophthalmic solution: 1 in each eye once a day (at bedtime)  metFORMIN 1000 mg oral tablet: 1 tab(s) orally 2 times a day   mirtazapine 7.5 mg oral tablet: 1 tab(s) orally once a day  modafinil 100 mg oral tablet: 1 orally once a day  mupirocin 2% topical ointment: Apply topically to affected area 2 times a day  predniSONE 20 mg oral tablet: 2 tab(s) orally once a day  Protonix 40 mg oral delayed release tablet: 1 tab(s) orally once a day   apixaban 2.5 mg oral tablet: 1 tab(s) orally every 12 hours  atorvastatin 40 mg oral tablet: 1 tab(s) orally once a day (at bedtime)  dilTIAZem 60 mg oral tablet: 1 tab(s) orally every 6 hours  gabapentin 400 mg oral tablet: 1 tab(s) orally 3 times a day  hydrALAZINE 50 mg oral tablet: 1 tab(s) orally every 6 hours  K-Tab 20 mEq oral tablet, extended release: 2 tab(s) orally 2 times a day  latanoprost 0.005% ophthalmic solution: 1 in each eye once a day (at bedtime)  metFORMIN 1000 mg oral tablet: 1 tab(s) orally 2 times a day   mirtazapine 7.5 mg oral tablet: 1 tab(s) orally once a day  modafinil 100 mg oral tablet: 1 orally once a day  mupirocin 2% topical ointment: Apply topically to affected area 2 times a day  predniSONE 20 mg oral tablet: 2 tab(s) orally once a day  Protonix 40 mg oral delayed release tablet: 1 tab(s) orally once a day

## 2023-08-19 ENCOUNTER — TRANSCRIPTION ENCOUNTER (OUTPATIENT)
Age: 73
End: 2023-08-19

## 2023-08-19 LAB
ANION GAP SERPL CALC-SCNC: 7 MMOL/L — SIGNIFICANT CHANGE UP (ref 5–17)
BUN SERPL-MCNC: 23 MG/DL — SIGNIFICANT CHANGE UP (ref 7–23)
CALCIUM SERPL-MCNC: 8.5 MG/DL — SIGNIFICANT CHANGE UP (ref 8.5–10.1)
CHLORIDE SERPL-SCNC: 112 MMOL/L — HIGH (ref 96–108)
CO2 SERPL-SCNC: 30 MMOL/L — SIGNIFICANT CHANGE UP (ref 22–31)
CREAT SERPL-MCNC: 1.3 MG/DL — SIGNIFICANT CHANGE UP (ref 0.5–1.3)
EGFR: 59 ML/MIN/1.73M2 — LOW
GLUCOSE SERPL-MCNC: 142 MG/DL — HIGH (ref 70–99)
HCT VFR BLD CALC: 33.4 % — LOW (ref 39–50)
HGB BLD-MCNC: 10.6 G/DL — LOW (ref 13–17)
MAGNESIUM SERPL-MCNC: 1.8 MG/DL — SIGNIFICANT CHANGE UP (ref 1.6–2.6)
MCHC RBC-ENTMCNC: 24.4 PG — LOW (ref 27–34)
MCHC RBC-ENTMCNC: 31.7 GM/DL — LOW (ref 32–36)
MCV RBC AUTO: 77 FL — LOW (ref 80–100)
NRBC # BLD: 0 /100 WBCS — SIGNIFICANT CHANGE UP (ref 0–0)
PHOSPHATE SERPL-MCNC: 2.5 MG/DL — SIGNIFICANT CHANGE UP (ref 2.5–4.5)
PLATELET # BLD AUTO: 240 K/UL — SIGNIFICANT CHANGE UP (ref 150–400)
POTASSIUM SERPL-MCNC: 3.5 MMOL/L — SIGNIFICANT CHANGE UP (ref 3.5–5.3)
POTASSIUM SERPL-SCNC: 3.5 MMOL/L — SIGNIFICANT CHANGE UP (ref 3.5–5.3)
RBC # BLD: 4.34 M/UL — SIGNIFICANT CHANGE UP (ref 4.2–5.8)
RBC # FLD: 17.8 % — HIGH (ref 10.3–14.5)
SODIUM SERPL-SCNC: 149 MMOL/L — HIGH (ref 135–145)
WBC # BLD: 11.41 K/UL — HIGH (ref 3.8–10.5)
WBC # FLD AUTO: 11.41 K/UL — HIGH (ref 3.8–10.5)

## 2023-08-19 PROCEDURE — 99232 SBSQ HOSP IP/OBS MODERATE 35: CPT

## 2023-08-19 RX ORDER — POTASSIUM CHLORIDE 20 MEQ
40 PACKET (EA) ORAL ONCE
Refills: 0 | Status: COMPLETED | OUTPATIENT
Start: 2023-08-19 | End: 2023-08-19

## 2023-08-19 RX ORDER — SODIUM CHLORIDE 9 MG/ML
1000 INJECTION INTRAMUSCULAR; INTRAVENOUS; SUBCUTANEOUS
Refills: 0 | Status: DISCONTINUED | OUTPATIENT
Start: 2023-08-19 | End: 2023-08-20

## 2023-08-19 RX ADMIN — Medication 60 MILLIGRAM(S): at 17:29

## 2023-08-19 RX ADMIN — Medication 60 MILLIGRAM(S): at 05:59

## 2023-08-19 RX ADMIN — CHLORHEXIDINE GLUCONATE 1 APPLICATION(S): 213 SOLUTION TOPICAL at 05:59

## 2023-08-19 RX ADMIN — PIPERACILLIN AND TAZOBACTAM 25 GRAM(S): 4; .5 INJECTION, POWDER, LYOPHILIZED, FOR SOLUTION INTRAVENOUS at 15:23

## 2023-08-19 RX ADMIN — MIRTAZAPINE 7.5 MILLIGRAM(S): 45 TABLET, ORALLY DISINTEGRATING ORAL at 12:04

## 2023-08-19 RX ADMIN — PIPERACILLIN AND TAZOBACTAM 25 GRAM(S): 4; .5 INJECTION, POWDER, LYOPHILIZED, FOR SOLUTION INTRAVENOUS at 22:28

## 2023-08-19 RX ADMIN — SODIUM CHLORIDE 4 MILLILITER(S): 9 INJECTION INTRAMUSCULAR; INTRAVENOUS; SUBCUTANEOUS at 19:29

## 2023-08-19 RX ADMIN — PIPERACILLIN AND TAZOBACTAM 25 GRAM(S): 4; .5 INJECTION, POWDER, LYOPHILIZED, FOR SOLUTION INTRAVENOUS at 05:58

## 2023-08-19 RX ADMIN — ATORVASTATIN CALCIUM 40 MILLIGRAM(S): 80 TABLET, FILM COATED ORAL at 22:28

## 2023-08-19 RX ADMIN — APIXABAN 2.5 MILLIGRAM(S): 2.5 TABLET, FILM COATED ORAL at 05:59

## 2023-08-19 RX ADMIN — Medication 25 MILLIGRAM(S): at 05:59

## 2023-08-19 RX ADMIN — Medication 3: at 17:29

## 2023-08-19 RX ADMIN — Medication 25 MILLIGRAM(S): at 15:23

## 2023-08-19 RX ADMIN — Medication 60 MILLIGRAM(S): at 00:56

## 2023-08-19 RX ADMIN — Medication 1: at 22:27

## 2023-08-19 RX ADMIN — Medication 1: at 12:03

## 2023-08-19 RX ADMIN — SODIUM CHLORIDE 4 MILLILITER(S): 9 INJECTION INTRAMUSCULAR; INTRAVENOUS; SUBCUTANEOUS at 09:34

## 2023-08-19 RX ADMIN — PANTOPRAZOLE SODIUM 40 MILLIGRAM(S): 20 TABLET, DELAYED RELEASE ORAL at 12:04

## 2023-08-19 RX ADMIN — SODIUM CHLORIDE 84 MILLILITER(S): 9 INJECTION INTRAMUSCULAR; INTRAVENOUS; SUBCUTANEOUS at 11:22

## 2023-08-19 RX ADMIN — Medication 40 MILLIGRAM(S): at 11:04

## 2023-08-19 RX ADMIN — Medication 60 MILLIGRAM(S): at 12:04

## 2023-08-19 RX ADMIN — Medication 25 MILLIGRAM(S): at 22:27

## 2023-08-19 RX ADMIN — SODIUM CHLORIDE 84 MILLILITER(S): 9 INJECTION INTRAMUSCULAR; INTRAVENOUS; SUBCUTANEOUS at 17:29

## 2023-08-19 RX ADMIN — Medication 40 MILLIEQUIVALENT(S): at 11:04

## 2023-08-19 RX ADMIN — APIXABAN 2.5 MILLIGRAM(S): 2.5 TABLET, FILM COATED ORAL at 17:29

## 2023-08-19 NOTE — PROGRESS NOTE ADULT - SUBJECTIVE AND OBJECTIVE BOX
[facility] -- Alexey Briceno Pannella, Patel, Savella [facility]  Office # [phone]      Follow Up:    af   Subjective/Observations:   Patient awake, alert, resting in bed. non verbal conversation  REVIEW OF SYSTEMS: All other review of systems is negative unless indicated above    PAST MEDICAL & SURGICAL HISTORY:  TBI (traumatic brain injury)      HTN (hypertension)      Atrial fibrillation      Peripheral neuropathy      Major depression      HLD (hyperlipidemia)      CAD (coronary artery disease)      BPH (benign prostatic hyperplasia)      Pneumonia due to COVID-19 virus  2022      Hemorrhage in the brain      H/O craniotomy          MEDICATIONS  (STANDING):  apixaban 2.5 milliGRAM(s) Oral every 12 hours  atorvastatin 40 milliGRAM(s) Oral at bedtime  chlorhexidine 2% Cloths 1 Application(s) Topical every 24 hours  dextrose 5%. 1000 milliLiter(s) (50 mL/Hr) IV Continuous <Continuous>  dextrose 5%. 1000 milliLiter(s) (100 mL/Hr) IV Continuous <Continuous>  dextrose 50% Injectable 25 Gram(s) IV Push once  dextrose 50% Injectable 25 Gram(s) IV Push once  dextrose 50% Injectable 12.5 Gram(s) IV Push once  diltiazem    Tablet 60 milliGRAM(s) Oral every 6 hours  glucagon  Injectable 1 milliGRAM(s) IntraMuscular once  hydrALAZINE 25 milliGRAM(s) Oral three times a day  insulin lispro (ADMELOG) corrective regimen sliding scale   SubCutaneous at bedtime  insulin lispro (ADMELOG) corrective regimen sliding scale   SubCutaneous three times a day before meals  mirtazapine 7.5 milliGRAM(s) Oral daily  pantoprazole  Injectable 40 milliGRAM(s) IV Push daily  piperacillin/tazobactam IVPB.. 3.375 Gram(s) IV Intermittent every 8 hours  potassium chloride    Tablet ER 40 milliEquivalent(s) Oral once  potassium chloride  10 mEq/100 mL IVPB 10 milliEquivalent(s) IV Intermittent every 1 hour  predniSONE   Tablet 40 milliGRAM(s) Oral daily  sodium chloride 0.225%. 1000 milliLiter(s) (84 mL/Hr) IV Continuous <Continuous>  sodium chloride 3%  Inhalation 4 milliLiter(s) Inhalation every 12 hours    MEDICATIONS  (PRN):  dextrose Oral Gel 15 Gram(s) Oral once PRN Blood Glucose LESS THAN 70 milliGRAM(s)/deciliter  sodium chloride 0.9% lock flush 10 milliLiter(s) IV Push every 1 hour PRN Pre/post blood products, medications, blood draw, and to maintain line patency      Allergies    No Known Allergies    Intolerances        Vital Signs Last 24 Hrs  T(C): 36.4 (19 Aug 2023 04:39), Max: 36.7 (18 Aug 2023 12:28)  T(F): 97.6 (19 Aug 2023 04:39), Max: 98.1 (19 Aug 2023 00:25)  HR: 74 (19 Aug 2023 09:35) (66 - 85)  BP: 120/64 (19 Aug 2023 05:00) (120/64 - 182/92)  BP(mean): --  RR: 18 (19 Aug 2023 04:39) (18 - 18)  SpO2: 96% (19 Aug 2023 09:35) (96% - 98%)    Parameters below as of 19 Aug 2023 09:35  Patient On (Oxygen Delivery Method): room air        I&O's Summary    18 Aug 2023 07:01  -  19 Aug 2023 07:00  --------------------------------------------------------  IN: 0 mL / OUT: 1650 mL / NET: -1650 mL          PHYSICAL EXAM:  TELE: not on tele   Constitutional: NAD, frail   HEENT: Moist Mucous Membranes, Anicteric  Pulmonary: Non-labored, breath sounds are clear bilaterally, No wheezing, crackles or rhonchi  Cardiovascular: Regular, S1 and S2 nl, No murmurs, rubs, gallops or clicks  Gastrointestinal: Bowel Sounds present, soft, nontender.   Lymph: No lymphadenopathy. No peripheral edema.  Skin: No visible rashes or ulcers.  Psych:  Mood & affect appropriate    LABS: All Labs Reviewed:                        10.6   11.41 )-----------( 240      ( 19 Aug 2023 06:03 )             33.4                         10.3   9.91  )-----------( 220      ( 18 Aug 2023 07:24 )             32.0                         9.5    8.69  )-----------( 210      ( 17 Aug 2023 08:16 )             29.2     19 Aug 2023 06:03    149    |  112    |  23     ----------------------------<  142    3.5     |  30     |  1.30   18 Aug 2023 07:24    147    |  111    |  25     ----------------------------<  179    3.0     |  27     |  1.10   17 Aug 2023 18:45    147    |  111    |  26     ----------------------------<  245    3.0     |  27     |  1.40     Ca    8.5        19 Aug 2023 06:03  Ca    8.6        18 Aug 2023 07:24  Ca    8.3        17 Aug 2023 18:45  Phos  2.5       19 Aug 2023 06:03  Phos  2.8       18 Aug 2023 07:24  Phos  2.0       17 Aug 2023 08:16  Mg     1.8       19 Aug 2023 06:03  Mg     1.8       18 Aug 2023 07:24  Mg     2.0       17 Aug 2023 08:16    TPro  6.3    /  Alb  3.3    /  TBili  0.8    /  DBili  x      /  AST  15     /  ALT  26     /  AlkPhos  69     18 Aug 2023 07:24  TPro  6.1    /  Alb  3.1    /  TBili  0.7    /  DBili  x      /  AST  11     /  ALT  19     /  AlkPhos  64     17 Aug 2023 08:16             EC Lead ECG:   Ventricular Rate 98 BPM    QRS Duration 76 ms    Q-T Interval 342 ms    QTC Calculation(Bazett) 436 ms    R Axis 213 degrees    T Axis 19 degrees    Diagnosis Line Atrial fibrillation  Right ventricular hypertrophy  Inferior infarct (cited on or before 22-MAY-2022)  Anterolateral infarct (cited on or before 22-MAY-2022)  Abnormal ECG  When compared with ECG of 10-IVAN-2023 20:29,  Questionable change in initial forces of Septal leads  Confirmed by Lexi Andujar ([id number]) on 2023 7:25:42 AM  (23 @ 20:39)      ACC: [id number] EXAM:  ECHO TTE WITH CON COMP W DOPP                          PROCEDURE DATE:  2023          INTERPRETATION:  TRANSTHORACIC ECHOCARDIOGRAM REPORT        Patient Name:   MELBA PARMAR Patient Location: [facility]  Medical Rec #:[id number]       Accession #:      [id number]  Account #:                     Height:           68.1 in 173.0 cm  YOB: 1950      Weight:           123.5 lb 56.00 kg  Patient Age:    73 years       BSA:              1.67 m²  Patient Gender: M              BP:               134/98 mmHg      Date of Exam:        2023 10:03:12 AM  Sonographer:         Simin Herron  Referring Physician: Matias Edmondson    Procedure:     2D Echo/Doppler/Color Doppler Complete.  Indications:   I48.91 - Unspecified atrial fibrillation  Diagnosis:     I48.91 - Unspecified atrial fibrillation  Study Details: Technically difficult study. Study quality was adversely   affected                 due to body habitus and the patient being uncooperative.        2D AND M-MODE MEASUREMENTS (normal ranges within parentheses):  Left                 Normal   Aorta/Left            Normal  Ventricle:                    Atrium:  IVSd (2D):    1.21  (0.7-1.1) Aortic Root    3.40  (2.4-3.7)                 cm     (2D):           cm  LVPWd (2D):   1.23  (0.7-1.1) Left Atrium    3.49  (1.9-4.0)                 cm             (2D):           cm  LVIDd (2D):   3.59  (3.4-5.7) LA Volume      25.7                 cm             Index         ml/m²  LVIDs (2D):  2.45                 cm  LV FS (2D):   31.8   (>25%)                  %  LV EF (2D):   61 %   (>55%)  Relative Wall 0.69   (<0.42)  Thickness    LV SYSTOLIC FUNCTION BY 2D PLANIMETRY (MOD):  EF-A4C View: 63.7 % EF-A2C View: 63.2 % EF-Biplane: 65 %    SPECTRAL DOPPLER ANALYSIS (where applicable):  Aortic Valve: AoV Max Bhaskar: 0.94 m/s AoV Peak PG: 3.6 mmHg AoV Mean P.8 mmHg    LVOT Vmax: 0.67 m/s LVOT VTI: 0.113 m LVOT Diameter: 2.37 cm    AoV Area, Vmax: 3.12 cm² AoV Area, VTI: 4.31 cm² AoVArea, Vmn: 3.19 cm²  Ao VTI: 0.116    PHYSICIAN INTERPRETATION:  Left Ventricle: The left ventricular internal cavity size is normal. Left   ventricular wall thickness is normal.  Global LV systolic function was normal. Left ventricular ejection   fraction, by visual estimation, is 60 to 65%. The mitral in-flow pattern   reveals no discernable A-wave, therefore no comment on diastolic function   can be made.  Right Ventricle: Normal right ventricular size and function.  Left Atrium: Normal left atrial size.  Right Atrium: Normal right atrial size.  Pericardium: There is no evidence of pericardial effusion.  Mitral Valve: There is mild mitral annular calcification. Trace mitral   valve regurgitation is seen.  Tricuspid Valve: The tricuspid valve is normal in structure. Adequate TR   velocity was not obtained to accurately assess RVSP.  Aortic Valve: The aortic valve is trileaflet. Sclerotic aortic valve with   normal opening. No evidence of aortic valve regurgitation is seen.  Pulmonic Valve: The pulmonic valve was not well visualized.  Aorta: The aortic root is normal in size and structure.  Pulmonary Artery: The pulmonary artery is not well seen.  Venous: The inferior vena cava is not well visualized.      Summary:   1. Left ventricular ejection fraction, by visual estimation, is 60 to   65%.   2. Normal global left ventricular systolic function.   3. The mitral in-flow pattern reveals no discernable A-wave, therefore   no comment on diastolic function can be made.   4. Normal left atrial size.   5. Normal right atrial size.   6. Mild mitral annular calcification.   7. Trace mitral valve regurgitation.   8. Sclerotic aortic valve with normal opening.    MD Scot Electronically signed on 2023 at 11:46:08 AM            *** Final ***      Radiology:

## 2023-08-19 NOTE — CASE MANAGEMENT PROGRESS NOTE - NSCMPROGRESSNOTE_GEN_ALL_CORE
Spoke with the patients spouse to discuss discharge plan. Spouse in agreement to discharge today. Confirmed ambulance transport with [facility] for 4pm . Called [facility] to confirm home health aide soc for 4pm today and [facility] care for VN, PT, OT and ST. Will remain available to patient and family throughout hospital stay
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
## 2023-08-19 NOTE — PROGRESS NOTE ADULT - SUBJECTIVE AND OBJECTIVE BOX
HPI:  Pt seen and examined at bedside. He is mostly non-verbal but is able to respond to yes/no questions when coached or with eye-blinking. Minimal success with communication today. Pt had a Cr. of 1.5-2 recently during his time in the ICU, downgraded to floors yesterday. Cr. has been slowly decreasing to 1.3 now with baseline at .6-.8. Potassium has remained <3 in the past 24 hours with multiple attempts to replete yesterday including 40mEq x 3 and a 10mEq IVPB x3 yesterday. Scheduled for 40mEq x3 today. Pt has had all of his medications renally dosed since admission to the ICU. Denies current cp, difficulty breathing, abd pain.    Vitals: T 98.7F, HR 78 --> 131 (Afib), /63 --> 76/52, RR 16, SpO2 98% NRB  Labs: WBC 45, Lactate 14, CO2 11, BUN 31, Cr 1.5, Glucose 66, GFR 49, pro-BNP 1654  VBG: pH 7.2, pCO2 23, pO2 129, HCO3 9  EKG: Afib @ 98bpm, RVH    CT Head:   Persistent posterior parafalcine subdural hematoma identified measuring   approximately 4 mm in thickness, without interval change as compared to   prior exam from 6 hours ago. No new mass effect, intraventricular   extension, or new sites of intracranial hemorrhage identified.    Previously suspected subarachnoid hemorrhage at the right frontal high   convexity is favored to represent streak artifact.    Multifocal chronic infarcts present, superimposed upon stable background   white matter hypoattenuation.    CT A/P:   Bilateral pulmonary groundglass opacities likely reflects atypical   pneumonia.  Thickening versus underdistention of the distal sigmoid colon. Correlate   for symptoms of colitis.     (13 Aug 2023 04:03)       PAST MEDICAL & SURGICAL HISTORY:  TBI (traumatic brain injury)      HTN (hypertension)      Atrial fibrillation      Peripheral neuropathy      Major depression      HLD (hyperlipidemia)      CAD (coronary artery disease)      BPH (benign prostatic hyperplasia)      Pneumonia due to COVID-19 virus  June 2022      Hemorrhage in the brain      H/O craniotomy         FAMILY HISTORY:  FH: HTN (hypertension) (Father, Mother, Sibling)    Family history of bone cancer (Sibling)    FH: Alzheimers disease (Sibling)    NC    Social History:Non smoker    MEDICATIONS  (STANDING):  apixaban 2.5 milliGRAM(s) Oral every 12 hours  atorvastatin 40 milliGRAM(s) Oral at bedtime  chlorhexidine 2% Cloths 1 Application(s) Topical every 24 hours  dextrose 5%. 1000 milliLiter(s) (100 mL/Hr) IV Continuous <Continuous>  dextrose 5%. 1000 milliLiter(s) (50 mL/Hr) IV Continuous <Continuous>  dextrose 50% Injectable 25 Gram(s) IV Push once  dextrose 50% Injectable 12.5 Gram(s) IV Push once  dextrose 50% Injectable 25 Gram(s) IV Push once  diltiazem    Tablet 60 milliGRAM(s) Oral every 6 hours  glucagon  Injectable 1 milliGRAM(s) IntraMuscular once  hydrocortisone sodium succinate Injectable 50 milliGRAM(s) IV Push every 8 hours  insulin lispro (ADMELOG) corrective regimen sliding scale   SubCutaneous at bedtime  insulin lispro (ADMELOG) corrective regimen sliding scale   SubCutaneous three times a day before meals  mirtazapine 7.5 milliGRAM(s) Oral daily  mupirocin 2% Nasal 1 Application(s) Both Nostrils every 12 hours  pantoprazole  Injectable 40 milliGRAM(s) IV Push daily  piperacillin/tazobactam IVPB.. 3.375 Gram(s) IV Intermittent every 8 hours  potassium chloride   Powder 40 milliEquivalent(s) Oral every 4 hours  sodium chloride 3%  Inhalation 4 milliLiter(s) Inhalation every 12 hours    MEDICATIONS  (PRN):  dextrose Oral Gel 15 Gram(s) Oral once PRN Blood Glucose LESS THAN 70 milliGRAM(s)/deciliter  sodium chloride 0.9% lock flush 10 milliLiter(s) IV Push every 1 hour PRN Pre/post blood products, medications, blood draw, and to maintain line patency   Meds reviewed    Allergies    No Known Allergies    Intolerances         REVIEW OF SYSTEMS:    Review of Systems difficult to obtain via communication difficulty:   Constitutional: Denies fatigue  HEENT: Denies headaches   Respiratory: denies SOB, cough, or wheezing  Cardiovascular: denies CP, palpitations  Gastrointestinal: Denies nausea, denies vomiting, diarrhea, constipation, abdominal pain, or bloody stools  Genitourinary: denies bloody urine      Vital Signs Last 24 Hrs  T(C): 36.4 (19 Aug 2023 04:39), Max: 36.7 (18 Aug 2023 12:28)  T(F): 97.6 (19 Aug 2023 04:39), Max: 98.1 (19 Aug 2023 00:25)  HR: 75 (19 Aug 2023 04:39) (70 - 85)  BP: 182/92 (19 Aug 2023 04:39) (128/86 - 182/92)  BP(mean): --  RR: 18 (19 Aug 2023 04:39) (18 - 18)  SpO2: 97% (19 Aug 2023 04:39) (97% - 98%)    Parameters below as of 19 Aug 2023 04:39  Patient On (Oxygen Delivery Method): room air        PHYSICAL EXAM:    GENERAL: NAD  HEAD:  Atraumatic, Normocephalic, Old scar well healed s/p craniotomy  EYES: EOMI, conjunctiva and sclera clear  ENMT: No Drainage from nares, No drainage from ears  NECK: Supple, neck  veins full  NERVOUS SYSTEM:  Awake and Alert  CHEST/LUNG: Clear to auscultation bilaterally; No rales, rhonchi, wheezing, or rubs  HEART: Irregular rhythm; No murmurs, rubs, or gallops  ABDOMEN: Soft, Nontender, Nondistended;   EXTREMITIES:  No peripheral edema      LABS:                        10.6   11.41 )-----------( 240      ( 19 Aug 2023 06:03 )             33.4     08-19    149<H>  |  112<H>  |  23  ----------------------------<  142<H>  3.5   |  30  |  1.30    Ca    8.5      19 Aug 2023 06:03  Phos  2.5     08-19  Mg     1.8     08-19    TPro  6.3  /  Alb  3.3  /  TBili  0.8  /  DBili  x   /  AST  15  /  ALT  26  /  AlkPhos  69  08-18      Urinalysis Basic - ( 19 Aug 2023 06:03 )    Color: x / Appearance: x / SG: x / pH: x  Gluc: 142 mg/dL / Ketone: x  / Bili: x / Urobili: x   Blood: x / Protein: x / Nitrite: x   Leuk Esterase: x / RBC: x / WBC x   Sq Epi: x / Non Sq Epi: x / Bacteria: x

## 2023-08-19 NOTE — PROGRESS NOTE ADULT - ASSESSMENT
72 y/o M with PMHx DM2, HTN, HLD A-fib (on Eliquis), TBI (s/p SDH with EVAC on 9/2021), and traumatic subdural hemorrhage in 10/2022, BIBEMS with nausea and vomiting. Admitted to ICU for sepsis 2/2 PNA, LATOYA and metabolic acidosis       Problem/Plan - 1:  ·  Problem: Sepsis with acute hypoxic respiratory failure.   ·  Plan: Likely 2/2 aspiration PNA  - CT Chest: Bilateral pulmonary ground glass opacities   - S/p Vanc,  Cefepime, Azithromycin in ED.   - Continue IV Zosyn   - continue bactroban  5 days bid   - CT A/P - concerning for colitis but no clinical sign /symptom of acute colitis   - Cont supp O2 to maintain O2 sat >92%  - Blood Cx (prelim - NGTD), Urine Cx (final - no growth)  - ID consulted, Dr. Manning, appreciate recs     Problem/Plan - 2:  ·  Problem: LATOYA (acute kidney injury).   ·  Plan: Baseline 0.6-0.8, per chart review  - dced Bicarb gtt for metabolic acidosis in ICU - now resolved  - avoid nephrotoxic meds.  - Cr improving  -Nephrology consulted, appreciate recs     Problem/Plan - 3:  ·  Problem: Chronic atrial fibrillation.   ·  Plan: Patient with chronic atrial fibrillation  - Tachycardic on admission, progressed to Afib with RVR likely 2/2 dehydration, hypotension, acute illness  - s/p Cardizem IVP 5mg on admission with improved rates  - restarted eliquis (renally dosed) given Hgb improvement and no active signs of bleed.   - restarted diltiazem at lower dose 30 q6hr sec to soft bp- increase to 60mg q6h as BP elevated  -  TTE- Left ventricular systolic function is normal with an ejection fraction visually estimated at 60 to 65 %.  - Cardio following dr kemp.     Problem/Plan - 4:  ·  Problem: Acute colitis.   ·  Plan: CT A/P: Colitis of distal sigmoid colon  - Diarrhea likely 2/2 inflammation vs infectious colitis. Low suspicion for C. diff  - Diet advanced to soft bite sized solid and thin liquids.     Problem/Plan - 5:  ·  Problem: Chronic generalized pain.   ·  Plan: Likely 2/2 deconditioning/immobility   - On home gabapentin.     Problem/Plan - 6:  ·  Problem: Type 2 diabetes mellitus with lactic acidosis.   ·  Plan: Well controlled, on home Metformin 1000mg BID. Not on home insulin  - P/w lactic acidosis in setting of acute illness, likely component of Metformin side effect as well  - A1C: 5.5 in Jun 2023  - Low ISS Regular finger sticks  - advanced diet to soft bite sized solid with thin liquids.     Problem/Plan - 7:  ·  Problem: Hypertension.   ·  Plan: Home regimen: Losartan, Hydralazine  - Hold home Losartan and Hydralazine given hypotension/septic shock  - dced levophed gtt in ICU  - restarted diltiazem- now on 60mg Q6 (on cardizem CD 240mg daily at home)  - wean off IV steroids      Problem/Plan - 8:  ·  Problem: Hyperlipidemia.   ·  Plan: - Continue statin.     Problem/Plan - 9:  ·  Problem: Need for prophylactic measure.   ·  Plan: DVT ppx: started Eliquis - as pt have hx of  dvt.

## 2023-08-19 NOTE — DISCHARGE NOTE NURSING/CASE MANAGEMENT/SOCIAL WORK - PATIENT PORTAL LINK FT
You can access the FollowMyHealth Patient Portal offered by [facility] by registering at the following website: [email or web address]. By joining [employer]’s FollowMyHealth portal, you will also be able to view your health information using other applications (apps) compatible with our system.

## 2023-08-19 NOTE — PROGRESS NOTE ADULT - ASSESSMENT
73M with PMH DM2, HTN, HLD A-fib (on Eliquis), TBI (s/p SDH with EVAC on 9/2021), and traumatic subdural hemorrhage in 10/2022, BIBEMS with hypoxia, nausea and vomiting. Admitted to ICU for sepsis pneumonia, bin, and metabolic acidosis.   Cardiologist: Dr. Mirta Goldsmith    A fib, HTN, HLD  - EKG showed atrial fibrillation on admission , no acute changes on EKG compared to previous.  - No clear evidence of acute ischemia, trops negative x 2.   - Continue Lipitor     - Pt , was initially tachy, now rate controlled off tele   - In the past when AC was held due to subdural hemorrhage, patient developed DVT.  - continue eliquis @ 2.5 mg bid   - continue  Cardizem,    -bp stable     - Previous TTE on 6/11/23 showed LVEF 60-65% with trace mitral regurgitation.  - No meaningful evidence of volume overload.    Faviola Jane FNP-C  Cardiology NP  [id number] [phone]

## 2023-08-19 NOTE — DISCHARGE NOTE NURSING/CASE MANAGEMENT/SOCIAL WORK - NSDCPEFALRISK_GEN_ALL_CORE
For information on Fall & Injury Prevention, visit: [email or web address] OR  [email or web address] OR  https://www.cdc.gov/steadi/patient.html

## 2023-08-19 NOTE — PROGRESS NOTE ADULT - ASSESSMENT
74 y/o M w/ PMH TBI, A-fib on Eliquis, DM2, HTN, HLD presents with sepsis pneumonia and LATOYA.    LATOYA on CKD  Hypernatremia  Hypophosphatemia  Hypokalemia  HTN    -Hypotonic IVF. Change to 0.225 saline IVF. Unable to use D5W with hyperglycemia   -Hypokalemia likely due to post ATN diuresis in combination with decreased PO intake/poor nutrition. Replace   -LATOYA improving to 1.3. Baseline .6-.8  -Monitor Mg, Phos  -F/u urine studies.  -Daily BMP  -Continue home meds as allowed due to hypotension  -All other management per primary team    8/17/-d/w family

## 2023-08-20 VITALS — OXYGEN SATURATION: 96 %

## 2023-08-20 PROCEDURE — 83880 ASSAY OF NATRIURETIC PEPTIDE: CPT

## 2023-08-20 PROCEDURE — 87640 STAPH A DNA AMP PROBE: CPT

## 2023-08-20 PROCEDURE — 82962 GLUCOSE BLOOD TEST: CPT

## 2023-08-20 PROCEDURE — 84484 ASSAY OF TROPONIN QUANT: CPT

## 2023-08-20 PROCEDURE — 82553 CREATINE MB FRACTION: CPT

## 2023-08-20 PROCEDURE — 85025 COMPLETE CBC W/AUTO DIFF WBC: CPT

## 2023-08-20 PROCEDURE — 85730 THROMBOPLASTIN TIME PARTIAL: CPT

## 2023-08-20 PROCEDURE — G1004: CPT

## 2023-08-20 PROCEDURE — 84132 ASSAY OF SERUM POTASSIUM: CPT

## 2023-08-20 PROCEDURE — 94640 AIRWAY INHALATION TREATMENT: CPT

## 2023-08-20 PROCEDURE — 70450 CT HEAD/BRAIN W/O DYE: CPT | Mod: ME

## 2023-08-20 PROCEDURE — 83605 ASSAY OF LACTIC ACID: CPT

## 2023-08-20 PROCEDURE — 83935 ASSAY OF URINE OSMOLALITY: CPT

## 2023-08-20 PROCEDURE — 96365 THER/PROPH/DIAG IV INF INIT: CPT

## 2023-08-20 PROCEDURE — 84156 ASSAY OF PROTEIN URINE: CPT

## 2023-08-20 PROCEDURE — 83735 ASSAY OF MAGNESIUM: CPT

## 2023-08-20 PROCEDURE — 96375 TX/PRO/DX INJ NEW DRUG ADDON: CPT

## 2023-08-20 PROCEDURE — 85027 COMPLETE CBC AUTOMATED: CPT

## 2023-08-20 PROCEDURE — 99232 SBSQ HOSP IP/OBS MODERATE 35: CPT

## 2023-08-20 PROCEDURE — 87086 URINE CULTURE/COLONY COUNT: CPT

## 2023-08-20 PROCEDURE — 87040 BLOOD CULTURE FOR BACTERIA: CPT

## 2023-08-20 PROCEDURE — P9047: CPT

## 2023-08-20 PROCEDURE — 92610 EVALUATE SWALLOWING FUNCTION: CPT

## 2023-08-20 PROCEDURE — 83036 HEMOGLOBIN GLYCOSYLATED A1C: CPT

## 2023-08-20 PROCEDURE — 93306 TTE W/DOPPLER COMPLETE: CPT

## 2023-08-20 PROCEDURE — 36415 COLL VENOUS BLD VENIPUNCTURE: CPT

## 2023-08-20 PROCEDURE — 99291 CRITICAL CARE FIRST HOUR: CPT

## 2023-08-20 PROCEDURE — 84100 ASSAY OF PHOSPHORUS: CPT

## 2023-08-20 PROCEDURE — 74176 CT ABD & PELVIS W/O CONTRAST: CPT | Mod: MA

## 2023-08-20 PROCEDURE — 84300 ASSAY OF URINE SODIUM: CPT

## 2023-08-20 PROCEDURE — 71250 CT THORAX DX C-: CPT | Mod: MA

## 2023-08-20 PROCEDURE — 71045 X-RAY EXAM CHEST 1 VIEW: CPT

## 2023-08-20 PROCEDURE — 87641 MR-STAPH DNA AMP PROBE: CPT

## 2023-08-20 PROCEDURE — 80053 COMPREHEN METABOLIC PANEL: CPT

## 2023-08-20 PROCEDURE — 99239 HOSP IP/OBS DSCHRG MGMT >30: CPT

## 2023-08-20 PROCEDURE — 84133 ASSAY OF URINE POTASSIUM: CPT

## 2023-08-20 PROCEDURE — 93005 ELECTROCARDIOGRAM TRACING: CPT

## 2023-08-20 PROCEDURE — 81001 URINALYSIS AUTO W/SCOPE: CPT

## 2023-08-20 PROCEDURE — 80202 ASSAY OF VANCOMYCIN: CPT

## 2023-08-20 PROCEDURE — 87449 NOS EACH ORGANISM AG IA: CPT

## 2023-08-20 PROCEDURE — 85610 PROTHROMBIN TIME: CPT

## 2023-08-20 PROCEDURE — 80048 BASIC METABOLIC PNL TOTAL CA: CPT

## 2023-08-20 PROCEDURE — 82803 BLOOD GASES ANY COMBINATION: CPT

## 2023-08-20 PROCEDURE — 94760 N-INVAS EAR/PLS OXIMETRY 1: CPT

## 2023-08-20 PROCEDURE — 84540 ASSAY OF URINE/UREA-N: CPT

## 2023-08-20 PROCEDURE — 0225U NFCT DS DNA&RNA 21 SARSCOV2: CPT

## 2023-08-20 PROCEDURE — 82570 ASSAY OF URINE CREATININE: CPT

## 2023-08-20 RX ORDER — HYDRALAZINE HCL 50 MG
1 TABLET ORAL
Qty: 90 | Refills: 0
Start: 2023-08-20 | End: 2023-09-18

## 2023-08-20 RX ORDER — HYDRALAZINE HCL 50 MG
1 TABLET ORAL
Qty: 56 | Refills: 0
Start: 2023-08-20 | End: 2023-09-02

## 2023-08-20 RX ORDER — APIXABAN 2.5 MG/1
1 TABLET, FILM COATED ORAL
Qty: 60 | Refills: 0
Start: 2023-08-20 | End: 2023-09-18

## 2023-08-20 RX ORDER — APIXABAN 2.5 MG/1
1 TABLET, FILM COATED ORAL
Refills: 0
Start: 2023-08-20

## 2023-08-20 RX ORDER — HYDRALAZINE HCL 50 MG
50 TABLET ORAL EVERY 6 HOURS
Refills: 0 | Status: DISCONTINUED | OUTPATIENT
Start: 2023-08-20 | End: 2023-08-20

## 2023-08-20 RX ADMIN — APIXABAN 2.5 MILLIGRAM(S): 2.5 TABLET, FILM COATED ORAL at 05:49

## 2023-08-20 RX ADMIN — Medication 40 MILLIGRAM(S): at 05:49

## 2023-08-20 RX ADMIN — SODIUM CHLORIDE 4 MILLILITER(S): 9 INJECTION INTRAMUSCULAR; INTRAVENOUS; SUBCUTANEOUS at 07:58

## 2023-08-20 RX ADMIN — Medication 1: at 08:26

## 2023-08-20 RX ADMIN — PIPERACILLIN AND TAZOBACTAM 25 GRAM(S): 4; .5 INJECTION, POWDER, LYOPHILIZED, FOR SOLUTION INTRAVENOUS at 05:48

## 2023-08-20 RX ADMIN — SODIUM CHLORIDE 84 MILLILITER(S): 9 INJECTION INTRAMUSCULAR; INTRAVENOUS; SUBCUTANEOUS at 00:28

## 2023-08-20 RX ADMIN — Medication 25 MILLIGRAM(S): at 05:49

## 2023-08-20 RX ADMIN — Medication 60 MILLIGRAM(S): at 05:48

## 2023-08-20 RX ADMIN — Medication 60 MILLIGRAM(S): at 00:24

## 2023-08-20 NOTE — CASE MANAGEMENT PROGRESS NOTE - NSCMPROGRESSNOTE_GEN_ALL_CORE
Patient discharge home today via ambulance. Referral sent to  ambulance. provided auth [id number] for transport

## 2023-08-20 NOTE — CASE MANAGEMENT PROGRESS NOTE - NSCMPROGRESSNOTE_GEN_ALL_CORE
Patient medically cleared for discharge. Auth# [id number] for transport via ambulance with [facility]. Transport changed to 8/20/23.  time 11:00am. Spouse notified of . Provided [facility] of authorization

## 2023-08-20 NOTE — PROGRESS NOTE ADULT - ASSESSMENT
72 y/o M w/ PMH TBI, A-fib on Eliquis, DM2, HTN, HLD presents with sepsis pneumonia and LATOYA.    LATOYA on CKD  Hypernatremia  Hypophosphatemia  Hypokalemia  HTN    -Hypotonic IVF for hypernatremia. Change to 0.225 saline IVF. Unable to use D5W with hyperglycemia   -Hypokalemia likely due to post ATN diuresis in combination with decreased PO intake/poor nutrition. Replace   -LATOYA improving to 1.3. Baseline .6-.8  -Monitor Mg, Phos  -F/u urine studies  -Daily BMP  -Continue home meds as allowed due to hypotension  -All other management per primary team  -Awaiting today's lab result     NO renal objection for dc planning.     8/17/-d/w family

## 2023-08-20 NOTE — PROGRESS NOTE ADULT - SUBJECTIVE AND OBJECTIVE BOX
HPI:  Pt seen and examined at bedside. He is mostly non-verbal but is able to respond to yes/no questions when coached or with eye-blinking. Minimal success with communication today. Pt had a Cr. of 1.5-2 recently during his time in the ICU, downgraded to floors yesterday. Cr. has been slowly decreasing to 1.3 now with baseline at .6-.8. Potassium has remained <3 in the past 24 hours with multiple attempts to replete yesterday including 40mEq x 3 and a 10mEq IVPB x3 yesterday. Scheduled for 40mEq x3 today. Pt has had all of his medications renally dosed since admission to the ICU. Denies current cp, difficulty breathing, abd pain.    Vitals: T 98.7F, HR 78 --> 131 (Afib), /63 --> 76/52, RR 16, SpO2 98% NRB  Labs: WBC 45, Lactate 14, CO2 11, BUN 31, Cr 1.5, Glucose 66, GFR 49, pro-BNP 1654  VBG: pH 7.2, pCO2 23, pO2 129, HCO3 9  EKG: Afib @ 98bpm, RVH    CT Head:   Persistent posterior parafalcine subdural hematoma identified measuring   approximately 4 mm in thickness, without interval change as compared to   prior exam from 6 hours ago. No new mass effect, intraventricular   extension, or new sites of intracranial hemorrhage identified.    Previously suspected subarachnoid hemorrhage at the right frontal high   convexity is favored to represent streak artifact.    Multifocal chronic infarcts present, superimposed upon stable background   white matter hypoattenuation.    CT A/P:   Bilateral pulmonary groundglass opacities likely reflects atypical   pneumonia.  Thickening versus underdistention of the distal sigmoid colon. Correlate   for symptoms of colitis.     (13 Aug 2023 04:03)       PAST MEDICAL & SURGICAL HISTORY:  TBI (traumatic brain injury)      HTN (hypertension)      Atrial fibrillation      Peripheral neuropathy      Major depression      HLD (hyperlipidemia)      CAD (coronary artery disease)      BPH (benign prostatic hyperplasia)      Pneumonia due to COVID-19 virus  June 2022      Hemorrhage in the brain      H/O craniotomy         FAMILY HISTORY:  FH: HTN (hypertension) (Father, Mother, Sibling)    Family history of bone cancer (Sibling)    FH: Alzheimers disease (Sibling)    NC    Social History:Non smoker    MEDICATIONS  (STANDING):  apixaban 2.5 milliGRAM(s) Oral every 12 hours  atorvastatin 40 milliGRAM(s) Oral at bedtime  chlorhexidine 2% Cloths 1 Application(s) Topical every 24 hours  dextrose 5%. 1000 milliLiter(s) (100 mL/Hr) IV Continuous <Continuous>  dextrose 5%. 1000 milliLiter(s) (50 mL/Hr) IV Continuous <Continuous>  dextrose 50% Injectable 25 Gram(s) IV Push once  dextrose 50% Injectable 12.5 Gram(s) IV Push once  dextrose 50% Injectable 25 Gram(s) IV Push once  diltiazem    Tablet 60 milliGRAM(s) Oral every 6 hours  glucagon  Injectable 1 milliGRAM(s) IntraMuscular once  hydrocortisone sodium succinate Injectable 50 milliGRAM(s) IV Push every 8 hours  insulin lispro (ADMELOG) corrective regimen sliding scale   SubCutaneous at bedtime  insulin lispro (ADMELOG) corrective regimen sliding scale   SubCutaneous three times a day before meals  mirtazapine 7.5 milliGRAM(s) Oral daily  mupirocin 2% Nasal 1 Application(s) Both Nostrils every 12 hours  pantoprazole  Injectable 40 milliGRAM(s) IV Push daily  piperacillin/tazobactam IVPB.. 3.375 Gram(s) IV Intermittent every 8 hours  potassium chloride   Powder 40 milliEquivalent(s) Oral every 4 hours  sodium chloride 3%  Inhalation 4 milliLiter(s) Inhalation every 12 hours    MEDICATIONS  (PRN):  dextrose Oral Gel 15 Gram(s) Oral once PRN Blood Glucose LESS THAN 70 milliGRAM(s)/deciliter  sodium chloride 0.9% lock flush 10 milliLiter(s) IV Push every 1 hour PRN Pre/post blood products, medications, blood draw, and to maintain line patency   Meds reviewed    Allergies    No Known Allergies    Intolerances         REVIEW OF SYSTEMS:    Review of Systems difficult to obtain via communication difficulty:   Constitutional: Denies fatigue  HEENT: Denies headaches   Respiratory: denies SOB, cough, or wheezing  Cardiovascular: denies CP, palpitations  Gastrointestinal: Denies nausea, denies vomiting, diarrhea, constipation, abdominal pain, or bloody stools  Genitourinary: denies bloody urine      Vital Signs Last 24 Hrs  T(C): 36.3 (20 Aug 2023 04:47), Max: 36.8 (19 Aug 2023 21:38)  T(F): 97.4 (20 Aug 2023 04:47), Max: 98.2 (19 Aug 2023 21:38)  HR: 80 (20 Aug 2023 08:02) (70 - 84)  BP: 166/97 (20 Aug 2023 04:47) (154/83 - 195/100)  BP(mean): --  RR: 19 (20 Aug 2023 04:47) (18 - 19)  SpO2: 96% (20 Aug 2023 08:02) (95% - 97%)    Parameters below as of 20 Aug 2023 08:02  Patient On (Oxygen Delivery Method): room air          PHYSICAL EXAM:    GENERAL: NAD  HEAD:  Atraumatic, Normocephalic, Old scar well healed s/p craniotomy  EYES: EOMI, conjunctiva and sclera clear  ENMT: No Drainage from nares, No drainage from ears  NECK: Supple, neck  veins full  NERVOUS SYSTEM:  Awake and Alert  CHEST/LUNG: Clear to auscultation bilaterally; No rales, rhonchi, wheezing, or rubs  HEART: Irregular rhythm; No murmurs, rubs, or gallops  ABDOMEN: Soft, Nontender, Nondistended;   EXTREMITIES:  No peripheral edema      LABS:                        10.6   11.41 )-----------( 240      ( 19 Aug 2023 06:03 )             33.4     08-19    149<H>  |  112<H>  |  23  ----------------------------<  142<H>  3.5   |  30  |  1.30    Ca    8.5      19 Aug 2023 06:03  Phos  2.5     08-19  Mg     1.8     08-19        Urinalysis Basic - ( 19 Aug 2023 06:03 )    Color: x / Appearance: x / SG: x / pH: x  Gluc: 142 mg/dL / Ketone: x  / Bili: x / Urobili: x   Blood: x / Protein: x / Nitrite: x   Leuk Esterase: x / RBC: x / WBC x   Sq Epi: x / Non Sq Epi: x / Bacteria: x

## 2023-08-20 NOTE — PROGRESS NOTE ADULT - ASSESSMENT
73M with PMH DM2, HTN, HLD A-fib (on Eliquis), TBI (s/p SDH with EVAC on 9/2021), and traumatic subdural hemorrhage in 10/2022, BIBEMS with hypoxia, nausea and vomiting. Admitted to ICU for sepsis pneumonia, bin, and metabolic acidosis.   Cardiologist: Dr. Mirta Goldsmith    Afib, HTN  - EKG showed atrial fibrillation on admission, no acute changes on EKG compared to previous.  - Afib is rate-controlled.  Continue Cardizem  - Continue Eliquis (half dose) in the setting of SDH?  Also with Hx DVT  - No clear evidence of acute ischemia, trops negative x 2.   - Continue Lipitor     - BP very elevated at systolic 160-190  - Increase Hydralazine to 50 mg q6H    - Previous TTE on 6/11/23 showed LVEF 60-65% with trace mitral regurgitation.  - No meaningful evidence of volume overload.  - Okay with IV hydration fro hypernatremia    - Monitor electrolytes, replete to keep K>4 and Mag>2  - Will continue to follow    Allison Perez DNP, NP-C, AGACNP-C  Cardiology   Call TEAMS

## 2023-08-20 NOTE — PROGRESS NOTE ADULT - SUBJECTIVE AND OBJECTIVE BOX
[facility] -- Alexey Briceno, Agus Sepulveda Savella, , Con Lombardi  Office # [phone]    Follow Up:      Subjective/Observations:     REVIEW OF SYSTEMS: All other review of systems is negative unless indicated above  PAST MEDICAL & SURGICAL HISTORY:  TBI (traumatic brain injury)      HTN (hypertension)      Atrial fibrillation      Peripheral neuropathy      Major depression      HLD (hyperlipidemia)      CAD (coronary artery disease)      BPH (benign prostatic hyperplasia)      Pneumonia due to COVID-19 virus  June 2022      Hemorrhage in the brain      H/O craniotomy        MEDICATIONS  (STANDING):  apixaban 2.5 milliGRAM(s) Oral every 12 hours  atorvastatin 40 milliGRAM(s) Oral at bedtime  chlorhexidine 2% Cloths 1 Application(s) Topical every 24 hours  dextrose 5%. 1000 milliLiter(s) (50 mL/Hr) IV Continuous <Continuous>  dextrose 5%. 1000 milliLiter(s) (100 mL/Hr) IV Continuous <Continuous>  dextrose 50% Injectable 25 Gram(s) IV Push once  dextrose 50% Injectable 25 Gram(s) IV Push once  dextrose 50% Injectable 12.5 Gram(s) IV Push once  diltiazem    Tablet 60 milliGRAM(s) Oral every 6 hours  glucagon  Injectable 1 milliGRAM(s) IntraMuscular once  hydrALAZINE 25 milliGRAM(s) Oral three times a day  insulin lispro (ADMELOG) corrective regimen sliding scale   SubCutaneous three times a day before meals  insulin lispro (ADMELOG) corrective regimen sliding scale   SubCutaneous at bedtime  mirtazapine 7.5 milliGRAM(s) Oral daily  pantoprazole  Injectable 40 milliGRAM(s) IV Push daily  potassium chloride  10 mEq/100 mL IVPB 10 milliEquivalent(s) IV Intermittent every 1 hour  predniSONE   Tablet 40 milliGRAM(s) Oral daily  sodium chloride 0.225%. 1000 milliLiter(s) (84 mL/Hr) IV Continuous <Continuous>  sodium chloride 3%  Inhalation 4 milliLiter(s) Inhalation every 12 hours    MEDICATIONS  (PRN):  dextrose Oral Gel 15 Gram(s) Oral once PRN Blood Glucose LESS THAN 70 milliGRAM(s)/deciliter  sodium chloride 0.9% lock flush 10 milliLiter(s) IV Push every 1 hour PRN Pre/post blood products, medications, blood draw, and to maintain line patency    Allergies    No Known Allergies    Intolerances      Vital Signs Last 24 Hrs  T(C): 36.3 (20 Aug 2023 04:47), Max: 36.8 (19 Aug 2023 21:38)  T(F): 97.4 (20 Aug 2023 04:47), Max: 98.2 (19 Aug 2023 21:38)  HR: 80 (20 Aug 2023 08:02) (70 - 84)  BP: 166/97 (20 Aug 2023 04:47) (154/83 - 195/100)  BP(mean): --  RR: 19 (20 Aug 2023 04:47) (18 - 19)  SpO2: 96% (20 Aug 2023 08:02) (95% - 97%)    Parameters below as of 20 Aug 2023 08:02  Patient On (Oxygen Delivery Method): room air      I&O's Summary    19 Aug 2023 07:01  -  20 Aug 2023 07:00  --------------------------------------------------------  IN: 2232 mL / OUT: 1500 mL / NET: 732 mL        PHYSICAL EXAM:  TELE:   Constitutional: NAD, awake and alert, well-developed  HEENT: Moist Mucous Membranes, Anicteric  Pulmonary: Non-labored, breath sounds are clear bilaterally, No wheezing, rales or rhonchi  Cardiovascular: Regular, S1 and S2, No murmurs, rubs, gallops or clicks  Gastrointestinal: Bowel Sounds present, soft, nontender.   Lymph: No peripheral edema. No lymphadenopathy.  Skin: No visible rashes or ulcers.  Psych:  Mood & affect appropriate  LABS: All Labs Reviewed:                        10.6   11.41 )-----------( 240      ( 19 Aug 2023 06:03 )             33.4                         10.3   9.91  )-----------( 220      ( 18 Aug 2023 07:24 )             32.0     19 Aug 2023 06:03    149    |  112    |  23     ----------------------------<  142    3.5     |  30     |  1.30   18 Aug 2023 07:24    147    |  111    |  25     ----------------------------<  179    3.0     |  27     |  1.10   17 Aug 2023 18:45    147    |  111    |  26     ----------------------------<  245    3.0     |  27     |  1.40     Ca    8.5        19 Aug 2023 06:03  Ca    8.6        18 Aug 2023 07:24  Ca    8.3        17 Aug 2023 18:45  Phos  2.5       19 Aug 2023 06:03  Phos  2.8       18 Aug 2023 07:24  Mg     1.8       19 Aug 2023 06:03  Mg     1.8       18 Aug 2023 07:24    TPro  6.3    /  Alb  3.3    /  TBili  0.8    /  DBili  x      /  AST  15     /  ALT  26     /  AlkPhos  69     18 Aug 2023 07:24          12 Lead ECG:   Ventricular Rate 98 BPM    QRS Duration 76 ms    Q-T Interval 342 ms    QTC Calculation(Bazett) 436 ms    R Axis 213 degrees    T Axis 19 degrees    Diagnosis Line Atrial fibrillation  Right ventricular hypertrophy  Inferior infarct (cited on or before 22-MAY-2022)  Anterolateral infarct (cited on or before 22-MAY-2022)  Abnormal ECG  When compared with ECG of 10-IVAN-2023 20:29,  Questionable change in initial forces of Septal leads  Confirmed by Lexi Andujar ([id number]) on 8/13/2023 7:25:42 AM  (08-12-23 @ 20:39)      TRANSTHORACIC ECHOCARDIOGRAM REPORT  ________________________________________________________________________________                                      _______       Pt. Name:       MELBA PARMAR Study Date:    8/14/2023  MRN:            [id number]       YOB: 1950  Accession #:    [id number]      Age:           73 years  Account#:       [id number]     Gender:        M  Heart Rate:                    Height:        69.00 in (175.26 cm)  Rhythm:                        Weight:  134.00 lb (60.78 kg)  Blood Pressure: 120/74 mmHg    BSA/BMI:       1.74 m² / 19.79 kg/m²  ________________________________________________________________________________________  Referring Physician:    [id number] Ranulfo Boggs  Interpreting Physician: Lexi Seals  Primary Sonographer:    Celia Whitmore RDCS    CPT:               ECHO TTE WO CON COMP W DOPP - 48264.m  Indication(s):     Shock, unspecified - R57.9  Procedure:         Transthoracic echocardiogram with 2-D, M-mode and complete                     spectral and color flow Doppler.  Ordering Location: ICU1  Study Information: Image quality for this study is technically difficult.    _______________________________________________________________________________________     CONCLUSIONS:      1. Technically difficult image quality.   2. Left ventricular systolic function is normal with an ejection fraction visually estimated at 60 to 65 %.   3. The right ventricle is not well visualized.   4. The left atrium is mildly dilated in size.   5. Aortic valve was not well visualized.   6. Trace mitral regurgitation.   7. Trace tricuspid regurgitation.    ________________________________________________________________________________________  FINDINGS:     Left Ventricle:  Left ventricular systolic function is normal with an ejection fraction visually estimated at 60 to 65%.     Right Ventricle:  The right ventricle is not well visualized. Normal wall thickness.     Left Atrium:  The left atrium is mildly dilated in size.     Right Atrium:  The right atrium is normal in size.     Aortic Valve:  The aortic valve was not well visualized.     Mitral Valve:  There is trace mitral regurgitation.     Tricuspid Valve:  There is trace tricuspid regurgitation. Estimated pulmonary artery systolic pressure is 29 mmHg.     Pulmonic Valve:  The pulmonic valve was not well visualized.     Pericardium:  No pericardial effusion seen.     Systemic Veins:  The inferior vena cava is normal in size (normal <2.1cm) with normal inspiratory collapse (normal >50%) consistent with normal right atrial pressure (~3, range 0-5mmHg).  ____________________________________________________________________  Quantitative Data:  Left Ventricle Measurements: (Indexed to BSA)     IVSd (2D):   1.2 cm  LVPWd (2D):  1.2 cm  LVIDd (2D):  4.1 cm  LVIDs (2D):  2.6 cm  LV Mass:     167 g  96.0 g/m²  Visualized LV EF%: 60 to 65%     MV E Vmax:    0.73 m/s  e' lateral:   12.00 cm/s  e' medial:    8.49 cm/s  E/e' lateral: 6.12  E/e' medial:  8.65  E/e' Average: 7.16  MV DT:        120 msec       Left Atrium Measurements: (Indexed to BSA)  LA Diam 2D: 4.10 cm       LVOT / RVOT/ Qp/Qs Data: (Indexed to BSA)  LVOT Diameter: 2.00 cm    Mitral Valve Measurements:     MV E Vmax: 0.7 m/s       Tricuspid Valve Measurements:     TR Vmax:          2.5 m/s  TR Peak Gradient: 25.6 mmHg  RA Pressure:      3 mmHg  PASP:             29 mmHg    ________________________________________________________________________________________  Electronically signed on8/15/2023 at 12:43:28 PM by Lexi Seals         *** Final ***      [facility] -- Alexey Briceno, Agus Sepulveda Savella, , Con Lombardi  Office # [phone]    Follow Up:  Afib    Subjective/Observations: Awake but non-verbal.  Not in any from of distress.  Comfortable on RA. Unable to obtain ROS    REVIEW OF SYSTEMS: All other review of systems is negative unless indicated above  PAST MEDICAL & SURGICAL HISTORY:  TBI (traumatic brain injury)  HTN (hypertension)  Atrial fibrillation  Peripheral neuropathy  Major depression  HLD (hyperlipidemia)  CAD (coronary artery disease)  BPH (benign prostatic hyperplasia)  Pneumonia due to COVID-19 virus  June 2022  Hemorrhage in the brain  H/O craniotomy    MEDICATIONS  (STANDING):  apixaban 2.5 milliGRAM(s) Oral every 12 hours  atorvastatin 40 milliGRAM(s) Oral at bedtime  chlorhexidine 2% Cloths 1 Application(s) Topical every 24 hours  dextrose 5%. 1000 milliLiter(s) (50 mL/Hr) IV Continuous <Continuous>  dextrose 5%. 1000 milliLiter(s) (100 mL/Hr) IV Continuous <Continuous>  dextrose 50% Injectable 25 Gram(s) IV Push once  dextrose 50% Injectable 25 Gram(s) IV Push once  dextrose 50% Injectable 12.5 Gram(s) IV Push once  diltiazem    Tablet 60 milliGRAM(s) Oral every 6 hours  glucagon  Injectable 1 milliGRAM(s) IntraMuscular once  hydrALAZINE 25 milliGRAM(s) Oral three times a day  insulin lispro (ADMELOG) corrective regimen sliding scale   SubCutaneous three times a day before meals  insulin lispro (ADMELOG) corrective regimen sliding scale   SubCutaneous at bedtime  mirtazapine 7.5 milliGRAM(s) Oral daily  pantoprazole  Injectable 40 milliGRAM(s) IV Push daily  potassium chloride  10 mEq/100 mL IVPB 10 milliEquivalent(s) IV Intermittent every 1 hour  predniSONE   Tablet 40 milliGRAM(s) Oral daily  sodium chloride 0.225%. 1000 milliLiter(s) (84 mL/Hr) IV Continuous <Continuous>  sodium chloride 3%  Inhalation 4 milliLiter(s) Inhalation every 12 hours    MEDICATIONS  (PRN):  dextrose Oral Gel 15 Gram(s) Oral once PRN Blood Glucose LESS THAN 70 milliGRAM(s)/deciliter  sodium chloride 0.9% lock flush 10 milliLiter(s) IV Push every 1 hour PRN Pre/post blood products, medications, blood draw, and to maintain line patency    Allergies    No Known Allergies    Intolerances    Vital Signs Last 24 Hrs  T(C): 36.3 (20 Aug 2023 04:47), Max: 36.8 (19 Aug 2023 21:38)  T(F): 97.4 (20 Aug 2023 04:47), Max: 98.2 (19 Aug 2023 21:38)  HR: 80 (20 Aug 2023 08:02) (70 - 84)  BP: 166/97 (20 Aug 2023 04:47) (154/83 - 195/100)  BP(mean): --  RR: 19 (20 Aug 2023 04:47) (18 - 19)  SpO2: 96% (20 Aug 2023 08:02) (95% - 97%)    Parameters below as of 20 Aug 2023 08:02  Patient On (Oxygen Delivery Method): room air    I&O's Summary    19 Aug 2023 07:01  -  20 Aug 2023 07:00  --------------------------------------------------------  IN: 2232 mL / OUT: 1500 mL / NET: 732 mL    PHYSICAL EXAM:  TELE: Not on tele  Constitutional: NAD, awake, frail  HEENT: Moist Mucous Membranes, Anicteric  Pulmonary: Non-labored, breath sounds are clear but diminished bilaterally, No wheezing, rales or rhonchi  Cardiovascular: IRRR, S1 and S2, No murmurs, rubs, gallops or clicks  Gastrointestinal: Bowel Sounds present, soft, nontender.   Lymph: No peripheral edema. No lymphadenopathy.  Skin: No visible rashes or ulcers.  Psych:  Mood & affect: Non-verbal  LABS: All Labs Reviewed:                        10.6   11.41 )-----------( 240      ( 19 Aug 2023 06:03 )             33.4                         10.3   9.91  )-----------( 220      ( 18 Aug 2023 07:24 )             32.0     19 Aug 2023 06:03    149    |  112    |  23     ----------------------------<  142    3.5     |  30     |  1.30   18 Aug 2023 07:24    147    |  111    |  25     ----------------------------<  179    3.0     |  27     |  1.10   17 Aug 2023 18:45    147    |  111    |  26     ----------------------------<  245    3.0     |  27     |  1.40     Ca    8.5        19 Aug 2023 06:03  Ca    8.6        18 Aug 2023 07:24  Ca    8.3        17 Aug 2023 18:45  Phos  2.5       19 Aug 2023 06:03  Phos  2.8       18 Aug 2023 07:24  Mg     1.8       19 Aug 2023 06:03  Mg     1.8       18 Aug 2023 07:24    TPro  6.3    /  Alb  3.3    /  TBili  0.8    /  DBili  x      /  AST  15     /  ALT  26     /  AlkPhos  69     18 Aug 2023 07:24      12 Lead ECG:   Ventricular Rate 98 BPM    QRS Duration 76 ms    Q-T Interval 342 ms    QTC Calculation(Bazett) 436 ms    R Axis 213 degrees    T Axis 19 degrees    Diagnosis Line Atrial fibrillation  Right ventricular hypertrophy  Inferior infarct (cited on or before 22-MAY-2022)  Anterolateral infarct (cited on or before 22-MAY-2022)  Abnormal ECG  When compared with ECG of 10-IVAN-2023 20:29,  Questionable change in initial forces of Septal leads  Confirmed by Lexi Andujar ([id number]) on 8/13/2023 7:25:42 AM  (08-12-23 @ 20:39)      TRANSTHORACIC ECHOCARDIOGRAM REPORT  ________________________________________________________________________________                                      _______       Pt. Name:       MELBA PARMAR Study Date:    8/14/2023  MRN:            [id number]       YOB: 1950  Accession #:    [id number]      Age:           73 years  Account#:       [id number]     Gender:        M  Heart Rate:                    Height:        69.00 in (175.26 cm)  Rhythm:                        Weight:  134.00 lb (60.78 kg)  Blood Pressure: 120/74 mmHg    BSA/BMI:       1.74 m² / 19.79 kg/m²  ________________________________________________________________________________________  Referring Physician:    [id number] Ranulfo Boggs  Interpreting Physician: Lexi Seals  Primary Sonographer:    Celia Whitmore RDCS    CPT:               ECHO TTE WO CON COMP W DOPP - 71567.m  Indication(s):     Shock, unspecified - R57.9  Procedure:         Transthoracic echocardiogram with 2-D, M-mode and complete                     spectral and color flow Doppler.  Ordering Location: [facility]  Study Information: Image quality for this study is technically difficult.    _______________________________________________________________________________________     CONCLUSIONS:      1. Technically difficult image quality.   2. Left ventricular systolic function is normal with an ejection fraction visually estimated at 60 to 65 %.   3. The right ventricle is not well visualized.   4. The left atrium is mildly dilated in size.   5. Aortic valve was not well visualized.   6. Trace mitral regurgitation.   7. Trace tricuspid regurgitation.    ________________________________________________________________________________________  FINDINGS:     Left Ventricle:  Left ventricular systolic function is normal with an ejection fraction visually estimated at 60 to 65%.     Right Ventricle:  The right ventricle is not well visualized. Normal wall thickness.     Left Atrium:  The left atrium is mildly dilated in size.     Right Atrium:  The right atrium is normal in size.     Aortic Valve:  The aortic valve was not well visualized.     Mitral Valve:  There is trace mitral regurgitation.     Tricuspid Valve:  There is trace tricuspid regurgitation. Estimated pulmonary artery systolic pressure is 29 mmHg.     Pulmonic Valve:  The pulmonic valve was not well visualized.     Pericardium:  No pericardial effusion seen.     Systemic Veins:  The inferior vena cava is normal in size (normal <2.1cm) with normal inspiratory collapse (normal >50%) consistent with normal right atrial pressure (~3, range 0-5mmHg).  ____________________________________________________________________  Quantitative Data:  Left Ventricle Measurements: (Indexed to BSA)     IVSd (2D):   1.2 cm  LVPWd (2D):  1.2 cm  LVIDd (2D):  4.1 cm  LVIDs (2D):  2.6 cm  LV Mass:     167 g  96.0 g/m²  Visualized LV EF%: 60 to 65%     MV E Vmax:    0.73 m/s  e' lateral:   12.00 cm/s  e' medial:    8.49 cm/s  E/e' lateral: 6.12  E/e' medial:  8.65  E/e' Average: 7.16  MV DT:        120 msec       Left Atrium Measurements: (Indexed to BSA)  LA Diam 2D: 4.10 cm       LVOT / RVOT/ Qp/Qs Data: (Indexed to BSA)  LVOT Diameter: 2.00 cm    Mitral Valve Measurements:     MV E Vmax: 0.7 m/s       Tricuspid Valve Measurements:     TR Vmax:          2.5 m/s  TR Peak Gradient: 25.6 mmHg  RA Pressure:      3 mmHg  PASP:             29 mmHg    ________________________________________________________________________________________  Electronically signed on8/15/2023 at 12:43:28 PM by Lexi Seals         *** Final ***

## 2023-08-20 NOTE — CHART NOTE - NSCHARTNOTEFT_GEN_A_CORE
Called by RN, pt with critical potassium level after repletion of electrolyte today. This am 2.6, K+ 40 mEq x1 po, 10 mEq IV x3 doses. Repeat potassium now 2.9, test not hemolyzed. Will discuss with attending in am. Plan as below.     PLAN:    - Tele reviewed Afib 70-90, no events on telemetry last 12 hours   - continue tele   - potassium 40 mEq po x2  - potassium 10 mEq IV x2   - magnesium 1 gram IV x1  - will check labs in am   - will consider renal consult in am   - will discuss with attending
RN called for pt with elevated BP of 194/92, 190/90 on manual repeat. Pt is without headache, dizziness, or other symptoms. Pt refusing to take PO Cardizem 60mg. Hydralazine 10mg IVP stat ordered.
: NUPUR Tran    INDICATION: hypotension    PROCEDURE:  [ ] LIMITED ECHO  [ ] LIMITED CHEST  [ ] LIMITED RETROPERITONEAL  [ ] LIMITED ABDOMINAL  [ ] LIMITED DVT  [ ] NEEDLE GUIDANCE VASCULAR  [ ] NEEDLE GUIDANCE THORACENTESIS  [ ] NEEDLE GUIDANCE PARACENTESIS  [ ] NEEDLE GUIDANCE PERICARDIOCENTESIS  [ ] OTHER    FINDINGS:  Apical 4: RV smaller than LV. No pericardial effusion. LV appears hyperdynamic. IVC 1.2cm  A line predominant anteriorly. R basilar consolidation seen with air bronchograms. L base appears consolidative.
Assessment: patient seen for follow up	  74 y/o M with PMHx DM2, HTN, HLD A-fib (on Eliquis), TBI (s/p SDH with EVAC on 9/2021), and traumatic subdural hemorrhage in 10/2022, BIBEMS with nausea and vomiting. Admitted to ICU for sepsis 2/2 PNA, LATOYA and metabolic acidosis now on regular floor  patient seen non verbal with family in room. extensive education on soft and bite sized diet at this time.   discussed options for outside purchase of soft and bite sized foods ([employer])  patient eating well today.   8/19 BM   discussed and gave sample of prosource supplement for home use possible discharge today       Factors impacting intake: [ ] none [ ] nausea  [ ] vomiting [ ] diarrhea [ ] constipation  [ ]chewing problems [x ] swallowing issues  [ ] other: per speech evaluation soft and bite sized thin liquids    Diet Prescription: soft and bite sized thin liquids consistent cho dash tlc  Intake: good PO up to 100% per flow sheets    Current Weight: 8/20 wt 124.5# 134.2# admit wt no edema will follow weights      Pertinent Medications: MEDICATIONS  (STANDING):  apixaban 2.5 milliGRAM(s) Oral every 12 hours  atorvastatin 40 milliGRAM(s) Oral at bedtime  chlorhexidine 2% Cloths 1 Application(s) Topical every 24 hours  dextrose 5%. 1000 milliLiter(s) (50 mL/Hr) IV Continuous <Continuous>  dextrose 5%. 1000 milliLiter(s) (100 mL/Hr) IV Continuous <Continuous>  dextrose 50% Injectable 25 Gram(s) IV Push once  dextrose 50% Injectable 25 Gram(s) IV Push once  dextrose 50% Injectable 12.5 Gram(s) IV Push once  diltiazem    Tablet 60 milliGRAM(s) Oral every 6 hours  glucagon  Injectable 1 milliGRAM(s) IntraMuscular once  hydrALAZINE 50 milliGRAM(s) Oral every 6 hours  insulin lispro (ADMELOG) corrective regimen sliding scale   SubCutaneous at bedtime  insulin lispro (ADMELOG) corrective regimen sliding scale   SubCutaneous three times a day before meals  mirtazapine 7.5 milliGRAM(s) Oral daily  pantoprazole  Injectable 40 milliGRAM(s) IV Push daily  potassium chloride  10 mEq/100 mL IVPB 10 milliEquivalent(s) IV Intermittent every 1 hour  predniSONE   Tablet 40 milliGRAM(s) Oral daily  sodium chloride 0.225%. 1000 milliLiter(s) (84 mL/Hr) IV Continuous <Continuous>  sodium chloride 3%  Inhalation 4 milliLiter(s) Inhalation every 12 hours    MEDICATIONS  (PRN):  dextrose Oral Gel 15 Gram(s) Oral once PRN Blood Glucose LESS THAN 70 milliGRAM(s)/deciliter  sodium chloride 0.9% lock flush 10 milliLiter(s) IV Push every 1 hour PRN Pre/post blood products, medications, blood draw, and to maintain line patency      Skin:   sacrum stage 1 left buttock stage 2 right buttock stage 2  Estimated Needs:   [x ] no change since previous assessment based on # 1.1-1.3gms protein/kg 79-94gms protein and 25-30kcals/kg 1812-2175kcals   [ ] recalculated:     Previous Nutrition Diagnosis:   [ ] Inadequate Energy Intake [x ]Inadequate Oral Intake [ ] Excessive Energy Intake   [ ] Underweight [x ] Increased Nutrient Needs [ ] Overweight/Obesity   [ ] Altered GI Function [ ] Unintended Weight Loss [ ] Food & Nutrition Related Knowledge Deficit [ ] Malnutrition     Nutrition Diagnosis is [ x] ongoing increased nutrient needs [ x] resolved inadequate oral intake  [ ] not applicable     New Nutrition Diagnosis: [x ] not applicable       Interventions:   Recommend  [ ] Change Diet To:  [ ] Nutrition Supplement  [ ] Nutrition Support  [x ] Other: education provided soft and bite sized diet, recommend prosource 30ml BID    Monitoring and Evaluation:   [ x] PO intake [ x ] Tolerance to diet prescription [ x ] weights [ x ] labs[ x ] follow up per protocol  [ ] other:

## 2023-08-21 RX ORDER — LOSARTAN POTASSIUM 50 MG/1
50 TABLET, FILM COATED ORAL TWICE DAILY
Qty: 180 | Refills: 3 | Status: DISCONTINUED | COMMUNITY
Start: 2022-06-24 | End: 2023-08-21

## 2023-08-25 ENCOUNTER — APPOINTMENT (OUTPATIENT)
Dept: WOUND CARE | Facility: HOSPITAL | Age: 73
End: 2023-08-25

## 2023-09-14 ENCOUNTER — APPOINTMENT (OUTPATIENT)
Dept: PHYSICAL MEDICINE AND REHAB | Facility: CLINIC | Age: 73
End: 2023-09-14

## 2023-09-14 ENCOUNTER — APPOINTMENT (OUTPATIENT)
Dept: WOUND CARE | Facility: HOSPITAL | Age: 73
End: 2023-09-14
Payer: COMMERCIAL

## 2023-09-14 ENCOUNTER — OUTPATIENT (OUTPATIENT)
Dept: OUTPATIENT SERVICES | Facility: HOSPITAL | Age: 73
LOS: 1 days | Discharge: HOME | End: 2023-09-14
Payer: COMMERCIAL

## 2023-09-14 VITALS
OXYGEN SATURATION: 95 % | BODY MASS INDEX: 20.09 KG/M2 | HEART RATE: 85 BPM | DIASTOLIC BLOOD PRESSURE: 87 MMHG | WEIGHT: 125 LBS | SYSTOLIC BLOOD PRESSURE: 155 MMHG | HEIGHT: 66 IN | TEMPERATURE: 97.6 F | RESPIRATION RATE: 18 BRPM

## 2023-09-14 DIAGNOSIS — E11.40 TYPE 2 DIABETES MELLITUS WITH DIABETIC NEUROPATHY, UNSPECIFIED: ICD-10-CM

## 2023-09-14 DIAGNOSIS — Z98.890 OTHER SPECIFIED POSTPROCEDURAL STATES: Chronic | ICD-10-CM

## 2023-09-14 DIAGNOSIS — L89.153 PRESSURE ULCER OF SACRAL REGION, STAGE 3: ICD-10-CM

## 2023-09-14 PROCEDURE — 99213 OFFICE O/P EST LOW 20 MIN: CPT

## 2023-09-14 PROCEDURE — G0463: CPT

## 2023-09-19 DIAGNOSIS — Z85.828 PERSONAL HISTORY OF OTHER MALIGNANT NEOPLASM OF SKIN: ICD-10-CM

## 2023-09-19 DIAGNOSIS — Z86.010 PERSONAL HISTORY OF COLONIC POLYPS: ICD-10-CM

## 2023-09-19 DIAGNOSIS — D72.9 DISORDER OF WHITE BLOOD CELLS, UNSPECIFIED: ICD-10-CM

## 2023-09-19 DIAGNOSIS — I10 ESSENTIAL (PRIMARY) HYPERTENSION: ICD-10-CM

## 2023-09-19 DIAGNOSIS — Z82.0 FAMILY HISTORY OF EPILEPSY AND OTHER DISEASES OF THE NERVOUS SYSTEM: ICD-10-CM

## 2023-09-19 DIAGNOSIS — E11.40 TYPE 2 DIABETES MELLITUS WITH DIABETIC NEUROPATHY, UNSPECIFIED: ICD-10-CM

## 2023-09-19 DIAGNOSIS — Z79.899 OTHER LONG TERM (CURRENT) DRUG THERAPY: ICD-10-CM

## 2023-09-19 DIAGNOSIS — Z86.79 PERSONAL HISTORY OF OTHER DISEASES OF THE CIRCULATORY SYSTEM: ICD-10-CM

## 2023-09-19 DIAGNOSIS — E53.8 DEFICIENCY OF OTHER SPECIFIED B GROUP VITAMINS: ICD-10-CM

## 2023-09-19 DIAGNOSIS — Z79.01 LONG TERM (CURRENT) USE OF ANTICOAGULANTS: ICD-10-CM

## 2023-09-19 DIAGNOSIS — L89.153 PRESSURE ULCER OF SACRAL REGION, STAGE 3: ICD-10-CM

## 2023-09-19 DIAGNOSIS — Z86.718 PERSONAL HISTORY OF OTHER VENOUS THROMBOSIS AND EMBOLISM: ICD-10-CM

## 2023-09-19 DIAGNOSIS — Z79.84 LONG TERM (CURRENT) USE OF ORAL HYPOGLYCEMIC DRUGS: ICD-10-CM

## 2023-09-19 DIAGNOSIS — L97.812 NON-PRESSURE CHRONIC ULCER OF OTHER PART OF RIGHT LOWER LEG WITH FAT LAYER EXPOSED: ICD-10-CM

## 2023-09-19 DIAGNOSIS — E78.00 PURE HYPERCHOLESTEROLEMIA, UNSPECIFIED: ICD-10-CM

## 2023-09-19 DIAGNOSIS — Z82.49 FAMILY HISTORY OF ISCHEMIC HEART DISEASE AND OTHER DISEASES OF THE CIRCULATORY SYSTEM: ICD-10-CM

## 2023-09-19 DIAGNOSIS — Z83.3 FAMILY HISTORY OF DIABETES MELLITUS: ICD-10-CM

## 2023-09-19 DIAGNOSIS — K44.9 DIAPHRAGMATIC HERNIA WITHOUT OBSTRUCTION OR GANGRENE: ICD-10-CM

## 2023-09-19 DIAGNOSIS — E55.9 VITAMIN D DEFICIENCY, UNSPECIFIED: ICD-10-CM

## 2023-09-19 DIAGNOSIS — K21.9 GASTRO-ESOPHAGEAL REFLUX DISEASE WITHOUT ESOPHAGITIS: ICD-10-CM

## 2023-09-19 DIAGNOSIS — Z98.890 OTHER SPECIFIED POSTPROCEDURAL STATES: ICD-10-CM

## 2023-09-19 DIAGNOSIS — Z85.820 PERSONAL HISTORY OF MALIGNANT MELANOMA OF SKIN: ICD-10-CM

## 2023-09-19 DIAGNOSIS — Z80.8 FAMILY HISTORY OF MALIGNANT NEOPLASM OF OTHER ORGANS OR SYSTEMS: ICD-10-CM

## 2023-09-19 DIAGNOSIS — N40.0 BENIGN PROSTATIC HYPERPLASIA WITHOUT LOWER URINARY TRACT SYMPTOMS: ICD-10-CM

## 2023-09-19 DIAGNOSIS — G91.9 HYDROCEPHALUS, UNSPECIFIED: ICD-10-CM

## 2023-09-19 DIAGNOSIS — Z83.438 FAMILY HISTORY OF OTHER DISORDER OF LIPOPROTEIN METABOLISM AND OTHER LIPIDEMIA: ICD-10-CM

## 2023-09-25 ENCOUNTER — INPATIENT (INPATIENT)
Facility: HOSPITAL | Age: 73
LOS: 4 days | Discharge: HOME | DRG: 871 | End: 2023-09-30
Attending: INTERNAL MEDICINE | Admitting: INTERNAL MEDICINE
Payer: COMMERCIAL

## 2023-09-25 VITALS
OXYGEN SATURATION: 95 % | RESPIRATION RATE: 20 BRPM | HEIGHT: 69 IN | WEIGHT: 125 LBS | SYSTOLIC BLOOD PRESSURE: 55 MMHG | DIASTOLIC BLOOD PRESSURE: 32 MMHG | HEART RATE: 96 BPM | TEMPERATURE: 97 F

## 2023-09-25 DIAGNOSIS — R79.89 OTHER SPECIFIED ABNORMAL FINDINGS OF BLOOD CHEMISTRY: ICD-10-CM

## 2023-09-25 DIAGNOSIS — Z29.9 ENCOUNTER FOR PROPHYLACTIC MEASURES, UNSPECIFIED: ICD-10-CM

## 2023-09-25 DIAGNOSIS — S06.9XAA UNSPECIFIED INTRACRANIAL INJURY WITH LOSS OF CONSCIOUSNESS STATUS UNKNOWN, INITIAL ENCOUNTER: ICD-10-CM

## 2023-09-25 DIAGNOSIS — E11.9 TYPE 2 DIABETES MELLITUS WITHOUT COMPLICATIONS: ICD-10-CM

## 2023-09-25 DIAGNOSIS — N17.9 ACUTE KIDNEY FAILURE, UNSPECIFIED: ICD-10-CM

## 2023-09-25 DIAGNOSIS — I10 ESSENTIAL (PRIMARY) HYPERTENSION: ICD-10-CM

## 2023-09-25 DIAGNOSIS — I48.91 UNSPECIFIED ATRIAL FIBRILLATION: ICD-10-CM

## 2023-09-25 DIAGNOSIS — R93.89 ABNORMAL FINDINGS ON DIAGNOSTIC IMAGING OF OTHER SPECIFIED BODY STRUCTURES: ICD-10-CM

## 2023-09-25 DIAGNOSIS — A41.9 SEPSIS, UNSPECIFIED ORGANISM: ICD-10-CM

## 2023-09-25 DIAGNOSIS — Z98.890 OTHER SPECIFIED POSTPROCEDURAL STATES: Chronic | ICD-10-CM

## 2023-09-25 LAB
ALBUMIN SERPL ELPH-MCNC: 2.7 G/DL — LOW (ref 3.3–5)
ALBUMIN SERPL ELPH-MCNC: 3.7 G/DL — SIGNIFICANT CHANGE UP (ref 3.3–5)
ALP SERPL-CCNC: 109 U/L — SIGNIFICANT CHANGE UP (ref 40–120)
ALP SERPL-CCNC: 173 U/L — HIGH (ref 40–120)
ALT FLD-CCNC: 17 U/L — SIGNIFICANT CHANGE UP (ref 12–78)
ALT FLD-CCNC: 23 U/L — SIGNIFICANT CHANGE UP (ref 12–78)
ANION GAP SERPL CALC-SCNC: 16 MMOL/L — SIGNIFICANT CHANGE UP (ref 5–17)
ANION GAP SERPL CALC-SCNC: 18 MMOL/L — HIGH (ref 5–17)
APPEARANCE UR: CLEAR — SIGNIFICANT CHANGE UP
APTT BLD: 37.5 SEC — HIGH (ref 24.5–35.6)
AST SERPL-CCNC: 12 U/L — LOW (ref 15–37)
AST SERPL-CCNC: 26 U/L — SIGNIFICANT CHANGE UP (ref 15–37)
BASE EXCESS BLDA CALC-SCNC: -12.7 MMOL/L — LOW (ref -2–3)
BASOPHILS # BLD AUTO: 0.3 K/UL — HIGH (ref 0–0.2)
BASOPHILS NFR BLD AUTO: 1 % — SIGNIFICANT CHANGE UP (ref 0–2)
BILIRUB SERPL-MCNC: 0.5 MG/DL — SIGNIFICANT CHANGE UP (ref 0.2–1.2)
BILIRUB SERPL-MCNC: 0.7 MG/DL — SIGNIFICANT CHANGE UP (ref 0.2–1.2)
BILIRUB UR-MCNC: NEGATIVE — SIGNIFICANT CHANGE UP
BLOOD GAS COMMENTS ARTERIAL: SIGNIFICANT CHANGE UP
BUN SERPL-MCNC: 24 MG/DL — HIGH (ref 7–23)
BUN SERPL-MCNC: 25 MG/DL — HIGH (ref 7–23)
CALCIUM SERPL-MCNC: 7.9 MG/DL — LOW (ref 8.5–10.1)
CALCIUM SERPL-MCNC: 9.4 MG/DL — SIGNIFICANT CHANGE UP (ref 8.5–10.1)
CHLORIDE SERPL-SCNC: 102 MMOL/L — SIGNIFICANT CHANGE UP (ref 96–108)
CHLORIDE SERPL-SCNC: 107 MMOL/L — SIGNIFICANT CHANGE UP (ref 96–108)
CO2 SERPL-SCNC: 16 MMOL/L — LOW (ref 22–31)
CO2 SERPL-SCNC: 22 MMOL/L — SIGNIFICANT CHANGE UP (ref 22–31)
COLOR SPEC: YELLOW — SIGNIFICANT CHANGE UP
CREAT SERPL-MCNC: 1.2 MG/DL — SIGNIFICANT CHANGE UP (ref 0.5–1.3)
CREAT SERPL-MCNC: 1.4 MG/DL — HIGH (ref 0.5–1.3)
DIFF PNL FLD: NEGATIVE — SIGNIFICANT CHANGE UP
EGFR: 54 ML/MIN/1.73M2 — LOW
EGFR: 65 ML/MIN/1.73M2 — SIGNIFICANT CHANGE UP
EOSINOPHIL # BLD AUTO: 0 K/UL — SIGNIFICANT CHANGE UP (ref 0–0.5)
EOSINOPHIL NFR BLD AUTO: 0 % — SIGNIFICANT CHANGE UP (ref 0–6)
FLUAV AG NPH QL: SIGNIFICANT CHANGE UP
FLUBV AG NPH QL: SIGNIFICANT CHANGE UP
GAS PNL BLDA: SIGNIFICANT CHANGE UP
GLUCOSE SERPL-MCNC: 117 MG/DL — HIGH (ref 70–99)
GLUCOSE SERPL-MCNC: 127 MG/DL — HIGH (ref 70–99)
GLUCOSE UR QL: NEGATIVE MG/DL — SIGNIFICANT CHANGE UP
HCO3 BLDA-SCNC: 14 MMOL/L — LOW (ref 21–28)
HCT VFR BLD CALC: 45.4 % — SIGNIFICANT CHANGE UP (ref 39–50)
HGB BLD-MCNC: 14.2 G/DL — SIGNIFICANT CHANGE UP (ref 13–17)
HOROWITZ INDEX BLDA+IHG-RTO: 30 — SIGNIFICANT CHANGE UP
INR BLD: 1.2 RATIO — HIGH (ref 0.85–1.18)
KETONES UR-MCNC: NEGATIVE MG/DL — SIGNIFICANT CHANGE UP
LACTATE SERPL-SCNC: 10.3 MMOL/L — CRITICAL HIGH (ref 0.7–2)
LACTATE SERPL-SCNC: 14 MMOL/L — CRITICAL HIGH (ref 0.7–2)
LACTATE SERPL-SCNC: 9.8 MMOL/L — CRITICAL HIGH (ref 0.7–2)
LEUKOCYTE ESTERASE UR-ACNC: NEGATIVE — SIGNIFICANT CHANGE UP
LIDOCAIN IGE QN: 27 U/L — SIGNIFICANT CHANGE UP (ref 13–75)
LYMPHOCYTES # BLD AUTO: 0.89 K/UL — LOW (ref 1–3.3)
LYMPHOCYTES # BLD AUTO: 3 % — LOW (ref 13–44)
MAGNESIUM SERPL-MCNC: 1.6 MG/DL — SIGNIFICANT CHANGE UP (ref 1.6–2.6)
MCHC RBC-ENTMCNC: 25.5 PG — LOW (ref 27–34)
MCHC RBC-ENTMCNC: 31.3 GM/DL — LOW (ref 32–36)
MCV RBC AUTO: 81.5 FL — SIGNIFICANT CHANGE UP (ref 80–100)
MONOCYTES # BLD AUTO: 0.89 K/UL — SIGNIFICANT CHANGE UP (ref 0–0.9)
MONOCYTES NFR BLD AUTO: 3 % — SIGNIFICANT CHANGE UP (ref 2–14)
NEUTROPHILS # BLD AUTO: 27.54 K/UL — HIGH (ref 1.8–7.4)
NEUTROPHILS NFR BLD AUTO: 92 % — HIGH (ref 43–77)
NITRITE UR-MCNC: NEGATIVE — SIGNIFICANT CHANGE UP
NRBC # BLD: SIGNIFICANT CHANGE UP /100 WBCS (ref 0–0)
PCO2 BLDA: 29 MMHG — LOW (ref 35–48)
PH BLDA: 7.29 — LOW (ref 7.35–7.45)
PH UR: 5.5 — SIGNIFICANT CHANGE UP (ref 5–8)
PHOSPHATE SERPL-MCNC: 4.4 MG/DL — SIGNIFICANT CHANGE UP (ref 2.5–4.5)
PLATELET # BLD AUTO: 376 K/UL — SIGNIFICANT CHANGE UP (ref 150–400)
PO2 BLDA: 73 MMHG — LOW (ref 83–108)
POTASSIUM SERPL-MCNC: 4.1 MMOL/L — SIGNIFICANT CHANGE UP (ref 3.5–5.3)
POTASSIUM SERPL-MCNC: 4.5 MMOL/L — SIGNIFICANT CHANGE UP (ref 3.5–5.3)
POTASSIUM SERPL-SCNC: 4.1 MMOL/L — SIGNIFICANT CHANGE UP (ref 3.5–5.3)
POTASSIUM SERPL-SCNC: 4.5 MMOL/L — SIGNIFICANT CHANGE UP (ref 3.5–5.3)
PROT SERPL-MCNC: 5.5 G/DL — LOW (ref 6–8.3)
PROT SERPL-MCNC: 7.7 G/DL — SIGNIFICANT CHANGE UP (ref 6–8.3)
PROT UR-MCNC: 30 MG/DL
PROTHROM AB SERPL-ACNC: 14 SEC — HIGH (ref 9.5–13)
RBC # BLD: 5.57 M/UL — SIGNIFICANT CHANGE UP (ref 4.2–5.8)
RBC # FLD: 21.7 % — HIGH (ref 10.3–14.5)
RSV RNA NPH QL NAA+NON-PROBE: SIGNIFICANT CHANGE UP
SAO2 % BLDA: 95.7 % — SIGNIFICANT CHANGE UP (ref 94–98)
SARS-COV-2 RNA SPEC QL NAA+PROBE: SIGNIFICANT CHANGE UP
SODIUM SERPL-SCNC: 140 MMOL/L — SIGNIFICANT CHANGE UP (ref 135–145)
SODIUM SERPL-SCNC: 141 MMOL/L — SIGNIFICANT CHANGE UP (ref 135–145)
SP GR SPEC: 1.01 — SIGNIFICANT CHANGE UP (ref 1–1.03)
UROBILINOGEN FLD QL: 1 MG/DL — SIGNIFICANT CHANGE UP (ref 0.2–1)
WBC # BLD: 29.61 K/UL — HIGH (ref 3.8–10.5)
WBC # FLD AUTO: 29.61 K/UL — HIGH (ref 3.8–10.5)

## 2023-09-25 PROCEDURE — 99291 CRITICAL CARE FIRST HOUR: CPT

## 2023-09-25 PROCEDURE — 70450 CT HEAD/BRAIN W/O DYE: CPT | Mod: 26

## 2023-09-25 PROCEDURE — 74177 CT ABD & PELVIS W/CONTRAST: CPT | Mod: 26

## 2023-09-25 PROCEDURE — 99223 1ST HOSP IP/OBS HIGH 75: CPT | Mod: GC

## 2023-09-25 PROCEDURE — 71045 X-RAY EXAM CHEST 1 VIEW: CPT | Mod: 26

## 2023-09-25 PROCEDURE — 71260 CT THORAX DX C+: CPT | Mod: 26

## 2023-09-25 RX ORDER — DILTIAZEM HCL 120 MG
10 CAPSULE, EXT RELEASE 24 HR ORAL ONCE
Refills: 0 | Status: COMPLETED | OUTPATIENT
Start: 2023-09-25 | End: 2023-09-25

## 2023-09-25 RX ORDER — HEPARIN SODIUM 5000 [USP'U]/ML
INJECTION INTRAVENOUS; SUBCUTANEOUS
Qty: 25000 | Refills: 0 | Status: DISCONTINUED | OUTPATIENT
Start: 2023-09-25 | End: 2023-09-27

## 2023-09-25 RX ORDER — CHLORHEXIDINE GLUCONATE 213 G/1000ML
1 SOLUTION TOPICAL
Refills: 0 | Status: DISCONTINUED | OUTPATIENT
Start: 2023-09-25 | End: 2023-09-28

## 2023-09-25 RX ORDER — HEPARIN SODIUM 5000 [USP'U]/ML
4000 INJECTION INTRAVENOUS; SUBCUTANEOUS EVERY 6 HOURS
Refills: 0 | Status: DISCONTINUED | OUTPATIENT
Start: 2023-09-25 | End: 2023-09-27

## 2023-09-25 RX ORDER — SODIUM CHLORIDE 9 MG/ML
1000 INJECTION, SOLUTION INTRAVENOUS
Refills: 0 | Status: DISCONTINUED | OUTPATIENT
Start: 2023-09-25 | End: 2023-09-25

## 2023-09-25 RX ORDER — PANTOPRAZOLE SODIUM 20 MG/1
40 TABLET, DELAYED RELEASE ORAL DAILY
Refills: 0 | Status: DISCONTINUED | OUTPATIENT
Start: 2023-09-25 | End: 2023-09-28

## 2023-09-25 RX ORDER — HEPARIN SODIUM 5000 [USP'U]/ML
4500 INJECTION INTRAVENOUS; SUBCUTANEOUS EVERY 6 HOURS
Refills: 0 | Status: DISCONTINUED | OUTPATIENT
Start: 2023-09-25 | End: 2023-09-25

## 2023-09-25 RX ORDER — HEPARIN SODIUM 5000 [USP'U]/ML
2000 INJECTION INTRAVENOUS; SUBCUTANEOUS EVERY 6 HOURS
Refills: 0 | Status: DISCONTINUED | OUTPATIENT
Start: 2023-09-25 | End: 2023-09-27

## 2023-09-25 RX ORDER — VANCOMYCIN HCL 1 G
1000 VIAL (EA) INTRAVENOUS ONCE
Refills: 0 | Status: COMPLETED | OUTPATIENT
Start: 2023-09-25 | End: 2023-09-25

## 2023-09-25 RX ORDER — SODIUM CHLORIDE 9 MG/ML
1000 INJECTION INTRAMUSCULAR; INTRAVENOUS; SUBCUTANEOUS ONCE
Refills: 0 | Status: COMPLETED | OUTPATIENT
Start: 2023-09-25 | End: 2023-09-25

## 2023-09-25 RX ORDER — SODIUM CHLORIDE 9 MG/ML
2200 INJECTION INTRAMUSCULAR; INTRAVENOUS; SUBCUTANEOUS ONCE
Refills: 0 | Status: COMPLETED | OUTPATIENT
Start: 2023-09-25 | End: 2023-09-25

## 2023-09-25 RX ORDER — DILTIAZEM HCL 120 MG
5 CAPSULE, EXT RELEASE 24 HR ORAL
Qty: 125 | Refills: 0 | Status: DISCONTINUED | OUTPATIENT
Start: 2023-09-25 | End: 2023-09-27

## 2023-09-25 RX ORDER — INFLUENZA VIRUS VACCINE 15; 15; 15; 15 UG/.5ML; UG/.5ML; UG/.5ML; UG/.5ML
0.7 SUSPENSION INTRAMUSCULAR ONCE
Refills: 0 | Status: DISCONTINUED | OUTPATIENT
Start: 2023-09-25 | End: 2023-09-30

## 2023-09-25 RX ORDER — HEPARIN SODIUM 5000 [USP'U]/ML
INJECTION INTRAVENOUS; SUBCUTANEOUS
Qty: 25000 | Refills: 0 | Status: DISCONTINUED | OUTPATIENT
Start: 2023-09-25 | End: 2023-09-25

## 2023-09-25 RX ORDER — ONDANSETRON 8 MG/1
4 TABLET, FILM COATED ORAL ONCE
Refills: 0 | Status: COMPLETED | OUTPATIENT
Start: 2023-09-25 | End: 2023-09-25

## 2023-09-25 RX ORDER — PIPERACILLIN AND TAZOBACTAM 4; .5 G/20ML; G/20ML
3.38 INJECTION, POWDER, LYOPHILIZED, FOR SOLUTION INTRAVENOUS EVERY 8 HOURS
Refills: 0 | Status: DISCONTINUED | OUTPATIENT
Start: 2023-09-25 | End: 2023-09-29

## 2023-09-25 RX ORDER — HEPARIN SODIUM 5000 [USP'U]/ML
4000 INJECTION INTRAVENOUS; SUBCUTANEOUS ONCE
Refills: 0 | Status: COMPLETED | OUTPATIENT
Start: 2023-09-25 | End: 2023-09-25

## 2023-09-25 RX ORDER — PIPERACILLIN AND TAZOBACTAM 4; .5 G/20ML; G/20ML
3.38 INJECTION, POWDER, LYOPHILIZED, FOR SOLUTION INTRAVENOUS ONCE
Refills: 0 | Status: COMPLETED | OUTPATIENT
Start: 2023-09-25 | End: 2023-09-25

## 2023-09-25 RX ORDER — HEPARIN SODIUM 5000 [USP'U]/ML
2000 INJECTION INTRAVENOUS; SUBCUTANEOUS EVERY 6 HOURS
Refills: 0 | Status: DISCONTINUED | OUTPATIENT
Start: 2023-09-25 | End: 2023-09-25

## 2023-09-25 RX ORDER — HEPARIN SODIUM 5000 [USP'U]/ML
4500 INJECTION INTRAVENOUS; SUBCUTANEOUS ONCE
Refills: 0 | Status: DISCONTINUED | OUTPATIENT
Start: 2023-09-25 | End: 2023-09-25

## 2023-09-25 RX ADMIN — Medication 5 MG/HR: at 17:17

## 2023-09-25 RX ADMIN — Medication 250 MILLIGRAM(S): at 15:56

## 2023-09-25 RX ADMIN — ONDANSETRON 4 MILLIGRAM(S): 8 TABLET, FILM COATED ORAL at 17:18

## 2023-09-25 RX ADMIN — Medication 10 MILLIGRAM(S): at 15:57

## 2023-09-25 RX ADMIN — SODIUM CHLORIDE 100 MILLILITER(S): 9 INJECTION, SOLUTION INTRAVENOUS at 21:00

## 2023-09-25 RX ADMIN — PIPERACILLIN AND TAZOBACTAM 200 GRAM(S): 4; .5 INJECTION, POWDER, LYOPHILIZED, FOR SOLUTION INTRAVENOUS at 15:25

## 2023-09-25 RX ADMIN — HEPARIN SODIUM 1000 UNIT(S)/HR: 5000 INJECTION INTRAVENOUS; SUBCUTANEOUS at 21:17

## 2023-09-25 RX ADMIN — PIPERACILLIN AND TAZOBACTAM 25 GRAM(S): 4; .5 INJECTION, POWDER, LYOPHILIZED, FOR SOLUTION INTRAVENOUS at 22:06

## 2023-09-25 RX ADMIN — HEPARIN SODIUM 4000 UNIT(S): 5000 INJECTION INTRAVENOUS; SUBCUTANEOUS at 21:18

## 2023-09-25 RX ADMIN — Medication 10 MILLIGRAM(S): at 15:21

## 2023-09-25 RX ADMIN — SODIUM CHLORIDE 1000 MILLILITER(S): 9 INJECTION INTRAMUSCULAR; INTRAVENOUS; SUBCUTANEOUS at 16:10

## 2023-09-25 RX ADMIN — SODIUM CHLORIDE 2200 MILLILITER(S): 9 INJECTION INTRAMUSCULAR; INTRAVENOUS; SUBCUTANEOUS at 14:43

## 2023-09-25 NOTE — H&P ADULT - TIME BILLING
I personally conducted a physical examination of the patient. I personally gathered the patient's history. I edited the above listed findings which were prepared by the listed resident physician. [facility] ICU

## 2023-09-25 NOTE — H&P ADULT - PROBLEM SELECTOR PLAN 6
- /60 on admission   - at home on hydralazine   - will hold as bp is soft  in the setting of sepsis   - will monitor cmp am  - DASH/TLC when able   - monitor BP

## 2023-09-25 NOTE — H&P ADULT - PROBLEM SELECTOR PLAN 4
On admission  BUN/CR: 24/1.4  : Baseline 0.6-0.8, per chart review   Likely ATN 2/2 prerenal azotemia from dehydration and decreased perfusion  - Recommend Nephro consult  - s/p 3L IVF in ED. Recommend NS continuous until lactate decreases. No volume over load on exam   - Further management per ICU.

## 2023-09-25 NOTE — ED ADULT NURSE NOTE - NSFALLLASTSIX_ED_ALL_ED
No. Principal Discharge DX:	ETOH abuse  Secondary Diagnosis:	Gastritis  Secondary Diagnosis:	Abdominal pain

## 2023-09-25 NOTE — H&P ADULT - PROBLEM SELECTOR PLAN 7
TBI (s/p SDH with EVAC on 9/2021), and traumatic subdural hemorrhage in 10/2022   at baseline   AAOx1 talks occasionally 1 -2 words

## 2023-09-25 NOTE — H&P ADULT - PROBLEM SELECTOR PLAN 8
CXR: Humeral lucencies requiring further evaluation neoplastic involvement is a consideration.   Follow-up with bone scan and/or MR.

## 2023-09-25 NOTE — H&P ADULT - ATTENDING COMMENTS
72 y/o M with PMHx DM2 on metformin with peripheral neuropathy, HTN, HLD A-fib (on Eliquis), TBI (s/p SDH with EVAC on 9/2021), and traumatic subdural hemorrhage in 10/2022 Covid Pna, and major depression presented to the ED with c/o vomiting and diarrhea started today. CT chest  A/P shows: Bibasilar airspace opacities suspicious for infection.  Rectosigmoid colitis. Admitted for Afib with RVR, Lactemia (GORDON/SHERLYN?) and Sepsis 2/2  Rectosigmoid colitis in  ICU    Agree with above. Edited where appropriate

## 2023-09-25 NOTE — H&P ADULT - PROBLEM SELECTOR PLAN 1
-  Patient on admission with a fib wit RVR  at rates 120-130's , at times ranges >170's in the setting of sepsis  -  EKG: A fib with RVR 128bpm QTc 458 ms    - s/p Cardizem 10 mg IV X1 Zofran 4mg IV X1 zosyn x1 vanco x1  3200cc NS and Cardizem infusion 225ML/HR  in ER  - Continue Cardizem infusion, can tritiate  up to 15 mg/hr as needed  - No evidence of volume overload  - Can hold Eliquis for now as patient is very lethargic and unable to take PO.  Would start FD Lovenox per cardio   - Had a normal TTE in 8/15/23.  No need to repeat  - Started on heparin gtt   - Cardio following Dr Sepulveda, following  - Care per ICU -  Patient on admission with a fib wit RVR  at rates 120-130's , at times ranges >170's in the setting of sepsis  -  EKG: A fib with RVR 128bpm QTc 458 ms    - s/p Cardizem 10 mg IV X1 Zofran 4mg IV X1 zosyn x1 vanco x1  3200cc NS and Cardizem infusion 225ML/HR  in ER  - Continue Cardizem infusion, can tritiate as needed  - No evidence of volume overload  - Can hold Eliquis for now as patient is very lethargic and unable to take PO.  Would start FD Lovenox per cardio   - Had a normal TTE in 8/15/23.  No need to repeat  - Started on heparin gtt   - Cardio following Dr Sepulveda, following  - Care per ICU

## 2023-09-25 NOTE — PATIENT PROFILE ADULT - FALL HARM RISK - HARM RISK INTERVENTIONS
Assistance with ambulation/Assistance OOB with selected safe patient handling equipment/Communicate Risk of Fall with Harm to all staff/Discuss with provider need for PT consult/Monitor gait and stability/Provide patient with walking aids - walker, cane, crutches/Reinforce activity limits and safety measures with patient and family/Review medications for side effects contributing to fall risk/Sit up slowly, dangle for a short time, stand at bedside before walking/Tailored Fall Risk Interventions/Toileting schedule using arm’s reach rule for commode and bathroom/Visual Cue: Yellow wristband and red socks/Bed in lowest position, wheels locked, appropriate side rails in place/Call bell, personal items and telephone in reach/Instruct patient to call for assistance before getting out of bed or chair/Non-slip footwear when patient is out of bed/[place] to call system/Physically safe environment - no spills, clutter or unnecessary equipment/Purposeful Proactive Rounding/Room/bathroom lighting operational, light cord in reach

## 2023-09-25 NOTE — H&P ADULT - MOLST COMPLETED
Subjective   Tim Alexis is a 15 m.o. male who presents with his mother for evaluation of fever and rash.     Mother reports a fever of 101.8 two days ago, received Motrin for fever with relief of symptoms.  Cough started 1 week ago, does not seem to be getting much better. Cough is productive in nature.  Mother reports she has been giving him OTC cough medication which has helped some.  Denies N/V/D, rhinorrhea, or nasal congestion.  Was seen in an Urgent Care clinic yesterday, flu and RSV were both negative.  Mother reports Tim has still been eating and drinking well. Normal urinary output.  Also reports a rash that was first noticed yesterday.  Denies the use of any new soaps, lotions, detergents, foods, or medications.  Mother states the rash does not seem to bother Tim, not itchy or painful.   No one else in the home with a rash.  Mother reports Tim's father and his girlfriend were sick and Tim was around them this past weekend. No other known sick contacts.    Fever    This is a new problem. The current episode started in the past 7 days. The problem has been gradually improving. The maximum temperature noted was 101 to 101.9 F. The temperature was taken using a rectal thermometer. Associated symptoms include coughing and a rash. Pertinent negatives include no congestion, diarrhea, ear pain, nausea or vomiting. He has tried NSAIDs for the symptoms. The treatment provided mild relief.   Risk factors: sick contacts    Rash   This is a new problem. The current episode started yesterday. The problem is unchanged. The affected locations include the face, chest and torso. The problem is mild. The rash is characterized by redness. He was exposed to nothing. The rash first occurred at home. Associated symptoms include coughing and a fever. Pertinent negatives include no congestion, diarrhea, rhinorrhea or vomiting. Past treatments include nothing. The treatment provided no relief. There were sick contacts  at home.        The following portions of the patient's history were reviewed and updated as appropriate: allergies, current medications, past family history, past medical history, past social history, past surgical history and problem list.    Review of Systems   Constitutional: Positive for fever. Negative for activity change and appetite change.   HENT: Negative for congestion, ear discharge, ear pain and rhinorrhea.    Eyes: Negative for discharge and redness.   Respiratory: Positive for cough.    Gastrointestinal: Negative for diarrhea, nausea and vomiting.   Skin: Positive for rash.       Objective   Physical Exam   Constitutional: He appears well-developed. No distress.   HENT:   Right Ear: Tympanic membrane normal.   Left Ear: Tympanic membrane normal.   Nose: No rhinorrhea or congestion.   Mouth/Throat: Mucous membranes are moist. Pharynx erythema present.   Eyes: Conjunctivae are normal. Right eye exhibits no discharge. Left eye exhibits no discharge.   Neck: Normal range of motion.   Cardiovascular: Regular rhythm, S1 normal and S2 normal.   Pulmonary/Chest: Effort normal and breath sounds normal. He has no wheezes. He has no rhonchi. He has no rales.   Abdominal: Soft. Bowel sounds are normal.   Musculoskeletal: Normal range of motion.   Neurological: He is alert.   Skin: Skin is warm. Rash noted. Rash is maculopapular. There is erythema.   Erythematous, blanchable rash noted to cheeks, chest, and abdomen   Nursing note and vitals reviewed.      Vitals:    11/08/19 1444   Temp: 99.7 °F (37.6 °C)       Assessment/Plan   Tim was seen today for rash and fever.    Diagnoses and all orders for this visit:    Viral URI with cough    Fever in pediatric patient  -     POC Rapid Strep A  -     Beta Strep Culture, Throat - Swab, Throat    Viral exanthem      RST negative, will send for backup culture and notify family if positive.  Rash likely d/t viral exanthem. Discussed usual course of virus and resolution.   Skin care as discussed.  Comfort measures as needed.  Reassurance given to parents.  No signs of bacterial infection on exam currently. Cough is likely d/t viral URI.  Discussed supportive measures, including Tylenol/Motrin for fever/discomfort, cool mist humidification, [employer] for cough or congestion.  Push fluids to minimize risk of dehydration.  Return to clinic if no improvement or for worsening symptoms          This document has been electronically signed by AMINA Eubanks on November 8, 2019 5:17 PM,.            25-Sep-2023

## 2023-09-25 NOTE — H&P ADULT - CONVERSATION DETAILS
Discussed current diagnosis and prognosis Wife states patient is DNR with trial of intubation. She only wants intubation if it is "temporary" and only if it is to correct respiratory failure. Wife will bring home copy of AD upon next visit. MOLST form prior wife  will bring in.    DNR with trial of intubation.

## 2023-09-25 NOTE — CONSULT NOTE ADULT - NS ATTEND AMEND GEN_ALL_CORE FT
72 y/o M with CAD, Afib, HTN, HLD, BPH, peripheral neuropathy, SDH s/p craniotomy, Covid Pna, and major depression presented to the ED c/o vomiting and diarrhea, found to be in sepsis and rapid Afib.    Rapid Afib/Sepsis  - Was recently admitted for sepsis in the setting of Pna requiring ICU admission  - With known Hx of Afib and on Eliquis half a dose.  Tachycardic this time at rates 120-130's in the setting of sepsis  - s/p IV bolus of 2.2L followed by another liter.  Can continue with IVF.  No evidence of volume overload  - s/p Cardizem 10 mg IV x 1 in ED.  Would start Cardizem gtt up at 5 mg/hr.  Increase as needed  - Can hold Eliquis for now as patient is very lethargic and unable to take PO.  Would start FD Lovenox  - Had a normal TTE in 8/15/23.  No need to repeat  - Would continue statin if able to take PO. Otherwise, can hold  - BP stable and controlled but would hold home Hydralazine for now  - He is at risk of decompensations.  Recommend ICU eval  - Will follow closey

## 2023-09-25 NOTE — CONSULT NOTE ADULT - NS PANP COMMENT GEN_ALL_CORE FT
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  Initial hypotension responsive to IVF but remains in uncontrolled with rising lactate.    Admit to ICU  metabolic encephalopathy, supportive care  respiratory status stable  uncontrolled afib, continue dilt gtt, may need additional agent  CKD at baseline  severe lactic acidosis, trend, suspect sepsis/hypoperfusion rather than metformin inducted  NPO for now  unclear source of sepsis, check CT c/a/p, start zosyn, f/u panCx  plan discussed with wife

## 2023-09-25 NOTE — H&P ADULT - PROBLEM SELECTOR PLAN 5
at home on metformin, suspecting metformin induced LA    blood glucose 127  on admission   NPO   If true metformin associated, observe for hypoglycemia.   HOLD Anti diabetic meds

## 2023-09-25 NOTE — H&P ADULT - NSHPPHYSICALEXAM_GEN_ALL_CORE
Vital Signs Last 24 Hrs  T(C): 36.2 (25 Sep 2023 18:35), Max: 36.3 (25 Sep 2023 13:49)  T(F): 97.2 (25 Sep 2023 18:35), Max: 97.4 (25 Sep 2023 13:49)  HR: 122 (25 Sep 2023 18:35) (96 - 154)  BP: 106/60 (25 Sep 2023 18:35) (55/32 - 138/67)  BP(mean): --  RR: 14 (25 Sep 2023 18:35) (14 - 22)  SpO2: 96% (25 Sep 2023 18:35) (92% - 100%)    Parameters below as of 25 Sep 2023 18:35  Patient On (Oxygen Delivery Method): nasal cannula  O2 Flow (L/min): 5    GENERAL:  no acute distress  EYES: sclera clear,EOM intact, PERRLA, no exudates  ENMT: normocephalic, atraumatic, moist mucous membranes,  NECK: supple, soft,   LUNGS: good air entry bilaterally, clear to auscultation, symmetric breath sounds, no wheezing or rhonchi appreciated  HEART: soft S1/S2, regular rate and rhythm, no murmurs noted, no lower extremity edema  GASTROINTESTINAL: abdomen is soft, nontender, nondistended, normoactive bowel sounds, no palpable masses  INTEGUMENT: good skin turgor, no lesions noted  MUSCULOSKELETAL: no cyanosis, clubbing, edema, calves nontender to palpation b/l  NEUROLOGIC: awake, alert, oriented x3, good muscle tone in 4 extremities, no obvious sensory deficits Vital Signs Last 24 Hrs  T(C): 36.2 (25 Sep 2023 18:35), Max: 36.3 (25 Sep 2023 13:49)  T(F): 97.2 (25 Sep 2023 18:35), Max: 97.4 (25 Sep 2023 13:49)  HR: 122 (25 Sep 2023 18:35) (96 - 154)  BP: 106/60 (25 Sep 2023 18:35) (55/32 - 138/67)  BP(mean): --  RR: 14 (25 Sep 2023 18:35) (14 - 22)  SpO2: 96% (25 Sep 2023 18:35) (92% - 100%)    Parameters below as of 25 Sep 2023 18:35  Patient On (Oxygen Delivery Method): nasal cannula  O2 Flow (L/min): 5    GENERAL:  no acute distress  EYES: sclera clear, EOM intact, PERRLA, no exudates  ENMT: normocephalic, atraumatic,+ DRY  mucous membranes,  NECK: supple, soft,   LUNGS: good air entry bilaterally, clear to auscultation, symmetric breath sounds, no wheezing or rhonchi appreciated  HEART: soft S1/S2,  IRRR. no murmurs noted, no lower extremity edema  GASTROINTESTINAL: abdomen is soft, nontender, nondistended, normoactive bowel sounds, no palpable masses  INTEGUMENT: FRAIL skin , + lesions noted  MUSCULOSKELETAL: no cyanosis, clubbing, edema, calves nontender to palpation b/l  NEUROLOGIC: awake, no focal deficits Vital Signs Last 24 Hrs  T(C): 36.2 (25 Sep 2023 18:35), Max: 36.3 (25 Sep 2023 13:49)  T(F): 97.2 (25 Sep 2023 18:35), Max: 97.4 (25 Sep 2023 13:49)  HR: 122 (25 Sep 2023 18:35) (96 - 154)  BP: 106/60 (25 Sep 2023 18:35) (55/32 - 138/67)  BP(mean): --  RR: 14 (25 Sep 2023 18:35) (14 - 22)  SpO2: 96% (25 Sep 2023 18:35) (92% - 100%)    Parameters below as of 25 Sep 2023 18:35  Patient On (Oxygen Delivery Method): nasal cannula  O2 Flow (L/min): 5  GENERAL:  no acute distress  EYES: sclera clear, EOM intact, PERRLA, no exudates  ENMT: normocephalic, atraumatic,+ DRY  mucous membranes,  NECK: supple, soft,   LUNGS: good air entry bilaterally, clear to auscultation, symmetric breath sounds, no wheezing or rhonchi appreciated  HEART: soft S1/S2,  IRRR. no murmurs noted, no lower extremity edema  GASTROINTESTINAL: abdomen is soft, nontender, nondistended, normoactive bowel sounds, no palpable masses  INTEGUMENT: FRAIL skin , + lesions noted  MUSCULOSKELETAL: no cyanosis, clubbing, edema, calves nontender to palpation b/l  NEUROLOGIC: awake, no focal deficits

## 2023-09-25 NOTE — CONSULT NOTE ADULT - SUBJECTIVE AND OBJECTIVE BOX
[facility] - Alexey Briceno Pannella, Patel, Savella, Cohen Con  Office Number: [phone]    Initial Consult Note    CHIEF COMPLAINT: Patient is a 73y old  Male who presents with a chief complaint of     HPI:  This is a 74 y/o M with CAD, Afib, HTN, HLD, BPH, peripheral neuropathy, SDH s/p craniotomy, Covid Pna, and major depression presented to the ED c/o vomiting and diarrhea, found to be in sepsis with lactate of 10 and in rapid Afib.  Of note, he was c/o constipation but started having diarrhea after stool softener.  Labs remarkable for leukocytosis (29) and lactate (10).  EKG showed Afib with RVR, rate 128 with Q waves in infero, anterolateral leads    PAST MEDICAL & SURGICAL HISTORY:  TBI (traumatic brain injury)  HTN (hypertension)  Atrial fibrillation  Peripheral neuropathy  Major depression  HLD (hyperlipidemia)  CAD (coronary artery disease)  BPH (benign prostatic hyperplasia)  Pneumonia due to COVID-19 virus  June 2022  Hemorrhage in the brain  H/O craniotomy    SOCIAL HISTORY:  No tobacco, ethanol, or drug abuse.  FAMILY HISTORY:  FH: HTN (hypertension) (Father, Mother, Sibling)    Family history of bone cancer (Sibling)    FH: Alzheimers disease (Sibling)    No family history of acute MI or sudden cardiac death.  MEDICATIONS  (STANDING):  sodium chloride 0.9% Bolus 1000 milliLiter(s) IV Bolus once    MEDICATIONS  (PRN):    Allergies    No Known Allergies    Intolerances    REVIEW OF SYSTEMS:  CONSTITUTIONAL: No weakness, fevers or chills  EYES/ENT: No visual changes;  No vertigo or throat pain   NECK: No pain or stiffness  RESPIRATORY: No cough, wheezing, hemoptysis; No shortness of breath  CARDIOVASCULAR: No chest pain or palpitations  GASTROINTESTINAL: No abdominal pain. No nausea, vomiting, or hematemesis; No diarrhea or constipation. No melena or hematochezia.  GENITOURINARY: No dysuria, frequency or hematuria  NEUROLOGICAL: No numbness or weakness  SKIN: No itching or rash  All other review of systems is negative unless indicated above  VITAL SIGNS:   Vital Signs Last 24 Hrs  T(C): 35.6 (25 Sep 2023 14:32), Max: 36.3 (25 Sep 2023 13:49)  T(F): 96 (25 Sep 2023 14:32), Max: 97.4 (25 Sep 2023 13:49)  HR: 139 (25 Sep 2023 14:32) (96 - 139)  BP: 138/67 (25 Sep 2023 14:32) (55/32 - 138/67)  BP(mean): --  RR: 20 (25 Sep 2023 14:32) (20 - 20)  SpO2: 100% (25 Sep 2023 14:32) (95% - 100%)    Parameters below as of 25 Sep 2023 14:32  Patient On (Oxygen Delivery Method): room air    I&O's Summary    On Exam:  Constitutional: NAD, alert and oriented x 3  Lungs:  Non-labored, breath sounds are clear bilaterally, No wheezing, rales or rhonchi  Cardiovascular: IRRR.  S1 and S2 positive.  No murmurs, rubs, gallops or clicks  Gastrointestinal: Bowel Sounds present, soft, nontender.   Lymph: No peripheral edema. No cervical lymphadenopathy.  Neurological: Alert, no focal deficits  Skin: No rashes or ulcers   Psych:  Mood & affect appropriate.    LABS: All Labs Reviewed:                        14.2   29.61 )-----------( 376      ( 25 Sep 2023 14:14 )             45.4     25 Sep 2023 14:14    140    |  102    |  24     ----------------------------<  127    4.5     |  22     |  1.40     Ca    9.4        25 Sep 2023 14:14    TPro  7.7    /  Alb  3.7    /  TBili  0.7    /  DBili  x      /  AST  26     /  ALT  23     /  AlkPhos  173    25 Sep 2023 14:14    PT/INR - ( 25 Sep 2023 14:14 )   PT: 14.0 sec;   INR: 1.20 ratio         PTT - ( 25 Sep 2023 14:14 )  PTT:37.5 sec  Blood Culture:   RADIOLOGY:  TRANSTHORACIC ECHOCARDIOGRAM REPORT  ________________________________________________________________________________                                      _______       Pt. Name:       MELBA PARMAR Study Date:    8/14/2023  MRN:            [id number]       YOB: 1950  Accession #:    [id number]      Age:           73 years  Account#:       [id number]     Gender:        M  Heart Rate:                    Height:        69.00 in (175.26 cm)  Rhythm:                        Weight:  134.00 lb (60.78 kg)  Blood Pressure: 120/74 mmHg    BSA/BMI:       1.74 m² / 19.79 kg/m²  ________________________________________________________________________________________  Referring Physician:    [id number] Ranulfo Boggs  Interpreting Physician: Lexi Seals  Primary Sonographer:    Celia Whitmore [facility]    CPT:               ECHO TTE WO CON COMP W DOPP - 37281.m  Indication(s):     Shock, unspecified - R57.9  Procedure:         Transthoracic echocardiogram with 2-D, M-mode and complete                     spectral and color flow Doppler.  Ordering Location: [facility]  Study Information: Image quality for this study is technically difficult.  ______________________________________________________________________________________     CONCLUSIONS:      1. Technically difficult image quality.   2. Left ventricular systolic function is normal with an ejection fraction visually estimated at 60 to 65 %.   3. The right ventricle is not well visualized.   4. The left atrium is mildly dilated in size.   5. Aortic valve was not well visualized.   6. Trace mitral regurgitation.   7. Trace tricuspid regurgitation.  ________________________________________________________________________________________  FINDINGS:     Left Ventricle:  Left ventricular systolic function is normal with an ejection fraction visually estimated at 60 to 65%.     Right Ventricle:  The right ventricle is not well visualized. Normal wall thickness.     Left Atrium:  The left atrium is mildly dilated in size.     Right Atrium:  The right atrium is normal in size.     Aortic Valve:  The aortic valve was not well visualized.     Mitral Valve:  There is trace mitral regurgitation.     Tricuspid Valve:  There is trace tricuspid regurgitation. Estimated pulmonary artery systolic pressure is 29 mmHg.     Pulmonic Valve:  The pulmonic valve was not well visualized.     Pericardium:  No pericardial effusion seen.     Systemic Veins:  The inferior vena cava is normal in size (normal <2.1cm) with normal inspiratory collapse (normal >50%) consistent with normal right atrial pressure (~3, range 0-5mmHg).  ____________________________________________________________________  Quantitative Data:  Left Ventricle Measurements: (Indexed to BSA)     IVSd (2D):   1.2 cm  LVPWd (2D):  1.2 cm  LVIDd (2D):  4.1 cm  LVIDs (2D):  2.6 cm  LV Mass:     167 g  96.0 g/m²  Visualized LV EF%: 60 to 65%     MV E Vmax:    0.73 m/s  e' lateral:   12.00 cm/s  e' medial:    8.49 cm/s  E/e' lateral: 6.12  E/e' medial:  8.65  E/e' Average: 7.16  MV DT:        120 msec  Left Atrium Measurements: (Indexed to BSA)  LA Diam 2D: 4.10 cm    LVOT / RVOT/ Qp/Qs Data: (Indexed to BSA)  LVOT Diameter: 2.00 cm    Mitral Valve Measurements:     MV E Vmax: 0.7 m/s  Tricuspid Valve Measurements:     TR Vmax:          2.5 m/s  TR Peak Gradient: 25.6 mmHg  RA Pressure:      3 mmHg  PASP:             29 mmHg  ________________________________________________________________________________________  Electronically signed on8/15/2023 at 12:43:28 PM by Lexi Seals    *** Final ***      EKG:   Afib with rates 128, Q waves in inferior and anterolateral leads    IN PROGRESS  Assessment/Plan:  74 y/o M with CAD, Afib, HTN, HLD, BPH, peripheral neuropathy, SDH s/p craniotomy, Covid Pna, and major depression presented to the ED c/o vomiting and diarrhea, found to be in sepsis and rapid Afib.    Rapid Afib/Sepsis  - Was recently admitted for sepsis in the setting of Pna requiring ICU admission  Allison Perez DNP, NP-C, AGACNP-C  Cardiology  Call TEAMS       [facility] - Alexey Briceno Pannella, Patel, Savella, Cohen Con  Office Number: [phone]    Initial Consult Note    CHIEF COMPLAINT: Patient is a 73y old  Male who presents with a chief complaint of     HPI:  This is a 74 y/o M bedbound with CAD, Afib, HTN, HLD, BPH, peripheral neuropathy, SDH s/p craniotomy, Covid Pna, and major depression presented to the ED c/o vomiting and diarrhea, found to be in sepsis with lactate of 10 and in rapid Afib.  Of note, he was c/o constipation but started having diarrhea after stool softener today.  Per spouse, patient has been lethargic but more so today.  Had not have a BM x 6 days so she gave him Senna.  Denies fever, chills, SOB, STACK, CP or palpitations.  Per spouse, patient at times coughs after drinking water but able to eat breakfast this morning and able to take all his meds.  Labs remarkable for leukocytosis (29) and lactate (10).  EKG showed Afib with RVR, rate 128 with Q waves in infero, anterolateral leads    PAST MEDICAL & SURGICAL HISTORY:  TBI (traumatic brain injury)  HTN (hypertension)  Atrial fibrillation  Peripheral neuropathy  Major depression  HLD (hyperlipidemia)  CAD (coronary artery disease)  BPH (benign prostatic hyperplasia)  Pneumonia due to COVID-19 virus  June 2022  Hemorrhage in the brain  H/O craniotomy    SOCIAL HISTORY:  No tobacco, ethanol, or drug abuse.  FAMILY HISTORY:  FH: HTN (hypertension) (Father, Mother, Sibling)    Family history of bone cancer (Sibling)    FH: Alzheimers disease (Sibling)    No family history of acute MI or sudden cardiac death.  MEDICATIONS  (STANDING):  sodium chloride 0.9% Bolus 1000 milliLiter(s) IV Bolus once    MEDICATIONS  (PRN):    Allergies    No Known Allergies    Intolerances    REVIEW OF SYSTEMS:  CONSTITUTIONAL: No weakness, fevers or chills. +lethargy  EYES/ENT: No visual changes;  No vertigo or throat pain   NECK: No pain or stiffness  RESPIRATORY: No cough, wheezing, hemoptysis; No shortness of breath  CARDIOVASCULAR: No chest pain or palpitations  GASTROINTESTINAL: No abdominal pain. No nausea, +vomiting, no hematemesis; + diarrhea +constipation. No melena or hematochezia.  GENITOURINARY: No dysuria, frequency or hematuria  NEUROLOGICAL: No numbness or weakness  SKIN: No itching or rash  All other review of systems is negative unless indicated above  VITAL SIGNS:   Vital Signs Last 24 Hrs  T(C): 35.6 (25 Sep 2023 14:32), Max: 36.3 (25 Sep 2023 13:49)  T(F): 96 (25 Sep 2023 14:32), Max: 97.4 (25 Sep 2023 13:49)  HR: 139 (25 Sep 2023 14:32) (96 - 139)  BP: 138/67 (25 Sep 2023 14:32) (55/32 - 138/67)  BP(mean): --  RR: 20 (25 Sep 2023 14:32) (20 - 20)  SpO2: 100% (25 Sep 2023 14:32) (95% - 100%)    Parameters below as of 25 Sep 2023 14:32  Patient On (Oxygen Delivery Method): room air    I&O's Summary    On Exam:  Constitutional: NAD, very lethargic  Lungs:  Non-labored, breath sounds are diminished bilaterally, No wheezing, rales or rhonchi  Cardiovascular: IRRR.  S1 and S2 positive.  No murmurs, rubs, gallops or clicks  Gastrointestinal: Bowel Sounds present, soft, nontender.   Lymph: No peripheral edema. No cervical lymphadenopathy.  Neurological: Alert, no focal deficits  Skin: No rashes or ulcers +skin lesions +cool skin  Psych:  Mood & affect: Barely responsive    LABS: All Labs Reviewed:                        14.2   29.61 )-----------( 376      ( 25 Sep 2023 14:14 )             45.4     25 Sep 2023 14:14    140    |  102    |  24     ----------------------------<  127    4.5     |  22     |  1.40     Ca    9.4        25 Sep 2023 14:14    TPro  7.7    /  Alb  3.7    /  TBili  0.7    /  DBili  x      /  AST  26     /  ALT  23     /  AlkPhos  173    25 Sep 2023 14:14    PT/INR - ( 25 Sep 2023 14:14 )   PT: 14.0 sec;   INR: 1.20 ratio      PTT - ( 25 Sep 2023 14:14 )  PTT:37.5 sec  Blood Culture:   RADIOLOGY:  TRANSTHORACIC ECHOCARDIOGRAM REPORT  ________________________________________________________________________________                                      _______  Pt. Name:       MELBA PARMAR Study Date:    8/14/2023  MRN:            [id number]       YOB: 1950  Accession #:    [id number]      Age:           73 years  Account#:       [id number]     Gender:        M  Heart Rate:                    Height:        69.00 in (175.26 cm)  Rhythm:                        Weight:  134.00 lb (60.78 kg)  Blood Pressure: 120/74 mmHg    BSA/BMI:       1.74 m² / 19.79 kg/m²  ________________________________________________________________________________________  Referring Physician:    [id number] Ranulfo Boggs  Interpreting Physician: Lexi Seals  Primary Sonographer:    Celia Whitmore RDCS    CPT:               ECHO TTE WO CON COMP W DOPP - 75083.m  Indication(s):     Shock, unspecified - R57.9  Procedure:         Transthoracic echocardiogram with 2-D, M-mode and complete                     spectral and color flow Doppler.  [facility]ing [facility]  Study Information: Image quality for this study is technically difficult.  ______________________________________________________________________________________     CONCLUSIONS:      1. Technically difficult image quality.   2. Left ventricular systolic function is normal with an ejection fraction visually estimated at 60 to 65 %.   3. The right ventricle is not well visualized.   4. The left atrium is mildly dilated in size.   5. Aortic valve was not well visualized.   6. Trace mitral regurgitation.   7. Trace tricuspid regurgitation.  ________________________________________________________________________________________  FINDINGS:     Left Ventricle:  Left ventricular systolic function is normal with an ejection fraction visually estimated at 60 to 65%.     Right Ventricle:  The right ventricle is not well visualized. Normal wall thickness.     Left Atrium:  The left atrium is mildly dilated in size.     Right Atrium:  The right atrium is normal in size.     Aortic Valve:  The aortic valve was not well visualized.     Mitral Valve:  There is trace mitral regurgitation.     Tricuspid Valve:  There is trace tricuspid regurgitation. Estimated pulmonary artery systolic pressure is 29 mmHg.     Pulmonic Valve:  The pulmonic valve was not well visualized.     Pericardium:  No pericardial effusion seen.     Systemic Veins:  The inferior vena cava is normal in size (normal <2.1cm) with normal inspiratory collapse (normal >50%) consistent with normal right atrial pressure (~3, range 0-5mmHg).  ____________________________________________________________________  Quantitative Data:  Left Ventricle Measurements: (Indexed to BSA)     IVSd (2D):   1.2 cm  LVPWd (2D):  1.2 cm  LVIDd (2D):  4.1 cm  LVIDs (2D):  2.6 cm  LV Mass:     167 g  96.0 g/m²  Visualized LV EF%: 60 to 65%     MV E Vmax:    0.73 m/s  e' lateral:   12.00 cm/s  e' medial:    8.49 cm/s  E/e' lateral: 6.12  E/e' medial:  8.65  E/e' Average: 7.16  MV DT:        120 msec  Left Atrium Measurements: (Indexed to BSA)  LA Diam 2D: 4.10 cm    LVOT / RVOT/ Qp/Qs Data: (Indexed to BSA)  LVOT Diameter: 2.00 cm    Mitral Valve Measurements:     MV E Vmax: 0.7 m/s  Tricuspid Valve Measurements:     TR Vmax:          2.5 m/s  TR Peak Gradient: 25.6 mmHg  RA Pressure:      3 mmHg  PASP:             29 mmHg  ________________________________________________________________________________________  Electronically signed on8/15/2023 at 12:43:28 PM by Lexi Seals    *** Final ***  EKG:   Afib with rates 128, Q waves in inferior and anterolateral leads

## 2023-09-25 NOTE — ED PROVIDER NOTE - OBJECTIVE STATEMENT
Patient is a 73-year-old male with past medical history of diabetes hypertension hyperlipidemia A-fib on Eliquis TBI traumatic subdural hemorrhage brought in by EMS for sudden onset of vomiting and diarrhea which started midnight this morning.  Wife states patient was constipated for few days so given stool softener and then started having diarrhea.  Patient unable to tolerate his medications morning vomited to 3 times.  No noted fever patient is baseline bedbound.

## 2023-09-25 NOTE — H&P ADULT - NSHPSOCIALHISTORY_GEN_ALL_CORE
Denies alcohol, tobacco, recreational drug use  Lives: at home with wife (primary care giver w/ HHA)  ADLs: fully dependent  Ambulation: bedbound, occasional wheelchair Denies alcohol, tobacco, recreational drug use  Lives: at home with wife (primary care giver w/ HHA)  Ambulation: bedbound, occasional wheelchair  Need help with feeding and ADLs

## 2023-09-25 NOTE — CONSULT NOTE ADULT - ASSESSMENT
72 y/o M bedbound with CAD, Afib, HTN, HLD, BPH, peripheral neuropathy, SDH s/p craniotomy, Covid Pna, and major depression presented to the ED c/o vomiting and diarrhea,  with lactate of 10 and in rapid Afib.     Assessment     1) Afib with RVR  2) Lactemia (GORDON/SHERLYN?)  3) Sepsis     Plan    Neuro: Previous TBI, bed bound. No complaints of pain. Avoid neurosuppressants. Will continue home MDD agents.   CV: Afib RVR. On cardizem at home. Given Cardizem IV 10mg x1 for rate in 170s. Started on cardizem gtt, actively increasing for rate control. May need additional agent for rate control. To note has been on DOAC at home for extended period of time. Previous echo revealed preserved EF. Cardiology consulted.   Pulm: no active issue. On 5L satting 90%. Pending CT chest.   Renal: appears to have baseline sCr of 1.4. Will obtain VBG to assess for acid/base balance. Will trend BMP with extended lytes. Strict I&Os.   GI: Patient with N/V, diarrhea for 1 day. Will send GI PCR and CDIFF toxin. abdominal exam benign. NPO for now. Pending CT abdomen. Protonix.   Endo: Diabetic on Metformin. Lactate of 10. May have component of GORDON. Continue to trend Lactate and renal fx. If true metformin associated, observe for hypoglycemia.   ID: Afebrile. Leukocytosis to 28k. Lactate of 10. Given 3.2L NS. Trend lactate. No obvious source of infection on exam. Will panscan. Given zoysn/vanco in ED. Will continue Zoysn. No evidence of resistant organism previously. Pending cultures. Trend CBC/fever curve.  Heme: Hemoconcentrated. CBC in AM. Holding DVT ppx till CT scans are complete.     60 minutes of critical care time spent providing medical care for patient's acute illness/conditions that impairs at least one vital organ system and/or poses a high risk of imminent or life threatening deterioration in the patient's condition. It includes time spent evaluating and treating the patient's acute illness as well as time spent reviewing labs, radiology, discussing goals of care with patient and/or patient's family, and discussing the case with a multidisciplinary team in an effort to prevent further life threatening deterioration or end organ damage. This time is independent of any procedures performed.            72 y/o M bedbound with CAD, Afib, HTN, HLD, BPH, peripheral neuropathy, SDH s/p craniotomy, Covid Pna, and major depression presented to the ED c/o vomiting and diarrhea,  with lactate of 10 and in rapid Afib.     Assessment     1) Afib with RVR  2) Lactemia (GORDON/SHERLYN?)  3) Sepsis     Plan    Neuro: Previous TBI, bed bound. No complaints of pain. Avoid neurosuppressants. Will continue home MDD agents.   CV: Afib RVR. On cardizem at home. Given Cardizem IV 10mg x1 for rate in 170s. Started on cardizem gtt, actively increasing for rate control. May need additional agent for rate control. To note has been on DOAC at home for extended period of time. Previous echo revealed preserved EF. Cardiology consulted.   Pulm: no active issue. On 5L satting 90%. Pending CT chest.   Renal: appears to have baseline sCr of 1.4. Will obtain VBG to assess for acid/base balance. Will trend BMP with extended lytes. Strict I&Os.   GI: Patient with N/V, diarrhea for 1 day. Will send GI PCR and CDIFF toxin. abdominal exam benign. NPO for now. Pending CT abdomen. Protonix.   Endo: Diabetic on Metformin. Lactate of 10. May have component of GORDON. Continue to trend Lactate and renal fx. If true metformin associated, observe for hypoglycemia.   ID: Afebrile. Leukocytosis to 28k. Lactate of 10. Given 3.2L NS. Trend lactate. No obvious source of infection on exam. Will panscan. Given zoysn/vanco in ED. Will continue Zoysn. No evidence of resistant organism previously. Pending cultures. Trend CBC/fever curve.  Heme: Hemoconcentrated. CBC in AM. Will need full AC for afib (chadvasc >2).     60 minutes of critical care time spent providing medical care for patient's acute illness/conditions that impairs at least one vital organ system and/or poses a high risk of imminent or life threatening deterioration in the patient's condition. It includes time spent evaluating and treating the patient's acute illness as well as time spent reviewing labs, radiology, discussing goals of care with patient and/or patient's family, and discussing the case with a multidisciplinary team in an effort to prevent further life threatening deterioration or end organ damage. This time is independent of any procedures performed.

## 2023-09-25 NOTE — ED ADULT NURSE NOTE - NSFALLUNIVINTERV_ED_ALL_ED
Bed/Stretcher in lowest position, wheels locked, appropriate side rails in place/Call bell, personal items and telephone in reach/Instruct patient to call for assistance before getting out of bed/chair/stretcher/Non-slip footwear applied when patient is off stretcher/[place] to call system/Physically safe environment - no spills, clutter or unnecessary equipment/Purposeful proactive rounding/Room/bathroom lighting operational, light cord in reach

## 2023-09-25 NOTE — CONSULT NOTE ADULT - SUBJECTIVE AND OBJECTIVE BOX
SURGERY PA CONSULT NOTE:    CHIEF COMPLAINT:  Patient is a 73y old  Male who presents with a chief complaint of A- fib with RVR and lactic acidosis (26 Sep 2023 00:33)    HPI FROM ED:  74 y/o M with PMHx T2DM on metformin with peripheral neuropathy, HTN, HLD A-fib (on Eliquis), TBI (s/p SDH with EVAC on 9/2021), and traumatic subdural hemorrhage in 10/2022 Covid Pna, and major depression presented to the ED with c/o vomiting and diarrhea started today. Patient is AA0x1 sometimes answer questions. Hx taken from wife ( HCP) Love Jarrell, reports he was doing fine till today, when he started vomiting non stop with liquid BM for at least 1 1/2 hrs this am. Reports she crushed metformin at home, as pill is too big to swallow. At home, he has home aide 10/hr per day, bedbound at baseline. In ER, pt found to be in sepsis with lactate of 14 and in rapid Afib.  Patient took am dose of Eliquis.  Per wife, he has constipation for past 6 days but started having diarrhea after stool softener Senna x1 given yesterday .Per spouse, patient has been lethargic but more so today  Denies fever, chills, SOB, STACK, CP or palpitations.    Past  admission for similar presentation found to have PNA  with septic shock. ICU consulted for Afib with RVR as well as lactacidosis    INTERVAL:  74 y/o male w/ PMH T2DM on metformin with peripheral neuropathy, HTN, HLD A-fib (on Eliquis), TBI (s/p SDH with EVAC on 9/2021), and traumatic subdural hemorrhage in 10/2022 Covid Pna, and major depression p/w vomiting and diarrhea x1 day. As per chart review, pt was in usual state of health before symptoms started. BMs liquid, unable to corroborate if BMs were grossly bloody. Pt largely unresponsive during this encounter, is able to shake head "no" when asked if he was in pain.   Of note, pt recently a/w to [facility]  from 08/13-08/20/2023 under similar circumstances, p/w vomiting and diarrhea, treated for pneumonia w/ sepsis necessitating ICU admission.  Pt unable to provide ROS 2/2 baseline mental status.    PAST MEDICAL & SURGICAL HISTORY:  TBI (traumatic brain injury)    HTN (hypertension)    Atrial fibrillation    Peripheral neuropathy    Major depression    HLD (hyperlipidemia)    CAD (coronary artery disease)    BPH (benign prostatic hyperplasia)    Pneumonia due to COVID-19 virus  June 2022    Hemorrhage in the brain    H/O craniotomy    REVIEW OF SYSTEMS:  Unable to provider 2/2 baseline mental status.    MEDICATIONS:  Home Medications:  Cardizem 60 mg oral tablet: 1 tab(s) orally every 6 hours (25 Sep 2023 19:53)  gabapentin 400 mg oral tablet: 1 tab(s) orally 3 times a day (25 Sep 2023 19:49)  hydrALAZINE 50 mg oral tablet: 1 tab(s) orally 3 times a day (25 Sep 2023 19:48)  latanoprost 0.005% ophthalmic solution: 1 in each eye once a day (at bedtime) (25 Sep 2023 19:49)  mirtazapine 7.5 mg oral tablet: 1 tab(s) orally once a day (25 Sep 2023 19:49)  modafinil 100 mg oral tablet: 1 orally once a day (25 Sep 2023 19:49)  mupirocin 2% topical ointment: Apply topically to affected area 2 times a day (25 Sep 2023 19:49)    MEDICATIONS  (STANDING):  chlorhexidine 2% Cloths 1 Application(s) Topical <User Schedule>  diltiazem Infusion 5 mG/Hr (5 mL/Hr) IV Continuous <Continuous>  heparin  Infusion.  Unit(s)/Hr (10 mL/Hr) IV Continuous <Continuous>  influenza  Vaccine (HIGH DOSE) 0.7 milliLiter(s) IntraMuscular once  pantoprazole  Injectable 40 milliGRAM(s) IV Push daily  piperacillin/tazobactam IVPB.. 3.375 Gram(s) IV Intermittent every 8 hours  sodium bicarbonate  Infusion 0.273 mEq/kG/Hr (100 mL/Hr) IV Continuous <Continuous>    MEDICATIONS  (PRN):  heparin   Injectable 4000 Unit(s) IV Push every 6 hours PRN For aPTT less than 40  heparin   Injectable 2000 Unit(s) IV Push every 6 hours PRN For aPTT between 40 - 57    ALLERGIES:  No Known Allergies    SOCIAL HISTORY:  Denies alcohol, tobacco, recreational drug use  Lives: at home with wife (primary care giver w/ HHA)  Ambulation: bedbound, occasional wheelchair  Need help with feeding and ADLs (25 Sep 2023 19:19)    FAMILY HISTORY:  FH: HTN (hypertension) (Father, Mother, Sibling)    Family history of bone cancer (Sibling)    FH: Alzheimers disease (Sibling)    VITAL SIGNS:  Vital Signs Last 24 Hrs  T(C): 37.1 (26 Sep 2023 01:00), Max: 37.1 (26 Sep 2023 01:00)  T(F): 98.8 (26 Sep 2023 01:00), Max: 98.8 (26 Sep 2023 01:00)  HR: 80 (26 Sep 2023 02:00) (78 - 154)  BP: 105/62 (26 Sep 2023 02:00) (55/32 - 138/67)  BP(mean): 77 (26 Sep 2023 02:00) (65 - 95)  RR: 12 (26 Sep 2023 02:00) (11 - 22)  SpO2: 99% (26 Sep 2023 02:00) (92% - 100%)    Parameters below as of 25 Sep 2023 20:26  Patient On (Oxygen Delivery Method): nasal cannula  O2 Flow (L/min): 4    PHYSICAL EXAM:  General: No acute distress, appears comfortable, well nourished, well-groomed, appears stated age  Neck: Supple, trachea in the midline  Abdomen: Soft, nondistended, Pt unable to endorse tenderness however no facial grimace upon palpation; No guarding, no rebound ttp  Extremity: No swelling, or open sores  Neuro: Unable to participate in exam  Skin: Good color, turgor, texture with no gross lesions, no eruptions, no rashes, no subcutaneous nodules and normal temperature.     LABS:                      14.2   29.61 )-----------( 376      ( 25 Sep 2023 14:14 )             45.4     09-25    141  |  107  |  25<H>  ----------------------------<  117<H>  4.1   |  16<L>  |  1.20    Ca    7.9<L>      25 Sep 2023 22:20  Phos  4.4     09-25  Mg     1.6     09-25    TPro  5.5<L>  /  Alb  2.7<L>  /  TBili  0.5  /  DBili  x   /  AST  12<L>  /  ALT  17  /  AlkPhos  109  09-25    PT/INR - ( 25 Sep 2023 14:14 )   PT: 14.0 sec;   INR: 1.20 ratio    PTT - ( 25 Sep 2023 14:14 )  PTT:37.5 sec  Urinalysis Basic - ( 25 Sep 2023 22:20 )    Color: x / Appearance: x / SG: x / pH: x  Gluc: 117 mg/dL / Ketone: x  / Bili: x / Urobili: x   Blood: x / Protein: x / Nitrite: x   Leuk Esterase: x / RBC: x / WBC x   Sq Epi: x / Non Sq Epi: x / Bacteria: x    LIVER FUNCTIONS - ( 25 Sep 2023 22:20 )  Alb: 2.7 g/dL / Pro: 5.5 g/dL / ALK PHOS: 109 U/L / ALT: 17 U/L / AST: 12 U/L / GGT: x           RADIOLOGY & ADDITIONAL STUDIES:  ACC: [id number] EXAM:  CT ABDOMEN AND PELVIS IC   ORDERED BY: RUSSELL NGUYEN   ACC: [id number] EXAM:  CT CHEST IC   ORDERED BY: RUSSELL NGUYEN   PROCEDURE DATE:  09/25/2023    COMPARISON: 8/12/2023.    FINDINGS:  CHEST:  LUNGS AND LARGE AIRWAYS: Retained secretions in the central airways.   Bibasilar airspace opacities.  PLEURA: Small pleural effusions.  VESSELS: Within normal limits.  HEART: Heart size is normal.  Trace pericardial effusion.  MEDIASTINUM AND ALYSSA: No lymphadenopathy.  CHEST WALL AND LOWER NECK: Within normal limits.    ABDOMEN AND PELVIS:  LIVER: Within normal limits.  BILE DUCTS: Normal caliber.  GALLBLADDER: Within normal limits.  SPLEEN: Multiple hypodense lesions.  PANCREAS: Within normal limits.  ADRENALS: Within normal limits.  KIDNEYS/URETERS: Cysts and other lesions too small to characterize.    BLADDER: Within normal limits.  REPRODUCTIVE ORGANS: The prostate is enlarged.    BOWEL: No bowel obstruction. There is moderate thickening of the   rectosigmoid colon consistent with colitis.  PERITONEUM: No ascites.  VESSELS:  Within normal limits.  RETROPERITONEUM/LYMPH NODES: No lymphadenopathy.  ABDOMINAL WALL: Small inguinal hernias.  BONES: Within normal limits.    IMPRESSION: Bibasilar airspace opacities suspicious for infection.    Rectosigmoid colitis.    --- End of Report ---  SAWYER MELGAR MD; Attending Radiologist    ASSESSMENT:  74 y/o male w/ PMH T2DM on metformin with peripheral neuropathy, HTN, HLD A-fib (on Eliquis), TBI (s/p SDH with EVAC on 9/2021), and traumatic subdural hemorrhage in 10/2022 Covid Pna, and major depression p/w vomiting and diarrhea x1 day.  General surgery consulted after rectosigmoid thickening/colitis noted on CTAP in setting of leukocytosis and lactic acidosis.  AFVSS. Pt largely unresponsive during encounter however was able to shake head "no" when asked if he was in pain.  Exam somewhat reassuring, abdomen remains soft, nondistended, and nontender (difficult to assess tenderness 2/2 baseline mental status)    PLAN:  - NPO  - Low suspicion that colitis is ischemic given physical exam, patent mesenteric vessels on CTAP, and nonbloody BMs  - F/u GI PCR  - STEFAN  - No emergent surgical intervention at this time, will follow SURGERY PA CONSULT NOTE:    CHIEF COMPLAINT:  Patient is a 73y old  Male who presents with a chief complaint of A- fib with RVR and lactic acidosis (26 Sep 2023 00:33)    HPI FROM ED:  74 y/o M with PMHx T2DM on metformin with peripheral neuropathy, HTN, HLD A-fib (on Eliquis), TBI (s/p SDH with EVAC on 9/2021), and traumatic subdural hemorrhage in 10/2022 Covid Pna, and major depression presented to the ED with c/o vomiting and diarrhea started today. Patient is AA0x1 sometimes answer questions. Hx taken from wife ( HCP) Love Jarrell, reports he was doing fine till today, when he started vomiting non stop with liquid BM for at least 1 1/2 hrs this am. Reports she crushed metformin at home, as pill is too big to swallow. At home, he has home aide 10/hr per day, bedbound at baseline. In ER, pt found to be in sepsis with lactate of 14 and in rapid Afib.  Patient took am dose of Eliquis.  Per wife, he has constipation for past 6 days but started having diarrhea after stool softener Senna x1 given yesterday .Per spouse, patient has been lethargic but more so today  Denies fever, chills, SOB, STACK, CP or palpitations.    Past  admission for similar presentation found to have PNA  with septic shock. ICU consulted for Afib with RVR as well as lactacidosis    INTERVAL:  74 y/o male w/ PMH T2DM on metformin with peripheral neuropathy, HTN, HLD A-fib (on Eliquis), TBI (s/p SDH with EVAC on 9/2021), and traumatic subdural hemorrhage in 10/2022 Covid Pna, and major depression p/w vomiting and diarrhea x1 day. As per chart review, pt was in usual state of health before symptoms started. BMs liquid, unable to corroborate if BMs were grossly bloody. Pt largely unresponsive during this encounter, is able to shake head "no" when asked if he was in pain.   Of note, pt recently a/[facility] to [facility]  from 08/13-08/20/2023 under similar circumstances, p/w vomiting and diarrhea, treated for pneumonia w/ sepsis necessitating ICU admission.  Pt unable to provide ROS 2/2 baseline mental status.    PAST MEDICAL & SURGICAL HISTORY:  TBI (traumatic brain injury)    HTN (hypertension)    Atrial fibrillation    Peripheral neuropathy    Major depression    HLD (hyperlipidemia)    CAD (coronary artery disease)    BPH (benign prostatic hyperplasia)    Pneumonia due to COVID-19 virus  June 2022    Hemorrhage in the brain    H/O craniotomy    REVIEW OF SYSTEMS:  Unable to provider 2/2 baseline mental status.    MEDICATIONS:  Home Medications:  Cardizem 60 mg oral tablet: 1 tab(s) orally every 6 hours (25 Sep 2023 19:53)  gabapentin 400 mg oral tablet: 1 tab(s) orally 3 times a day (25 Sep 2023 19:49)  hydrALAZINE 50 mg oral tablet: 1 tab(s) orally 3 times a day (25 Sep 2023 19:48)  latanoprost 0.005% ophthalmic solution: 1 in each eye once a day (at bedtime) (25 Sep 2023 19:49)  mirtazapine 7.5 mg oral tablet: 1 tab(s) orally once a day (25 Sep 2023 19:49)  modafinil 100 mg oral tablet: 1 orally once a day (25 Sep 2023 19:49)  mupirocin 2% topical ointment: Apply topically to affected area 2 times a day (25 Sep 2023 19:49)    MEDICATIONS  (STANDING):  chlorhexidine 2% Cloths 1 Application(s) Topical <User Schedule>  diltiazem Infusion 5 mG/Hr (5 mL/Hr) IV Continuous <Continuous>  heparin  Infusion.  Unit(s)/Hr (10 mL/Hr) IV Continuous <Continuous>  influenza  Vaccine (HIGH DOSE) 0.7 milliLiter(s) IntraMuscular once  pantoprazole  Injectable 40 milliGRAM(s) IV Push daily  piperacillin/tazobactam IVPB.. 3.375 Gram(s) IV Intermittent every 8 hours  sodium bicarbonate  Infusion 0.273 mEq/kG/Hr (100 mL/Hr) IV Continuous <Continuous>    MEDICATIONS  (PRN):  heparin   Injectable 4000 Unit(s) IV Push every 6 hours PRN For aPTT less than 40  heparin   Injectable 2000 Unit(s) IV Push every 6 hours PRN For aPTT between 40 - 57    ALLERGIES:  No Known Allergies    SOCIAL HISTORY:  Denies alcohol, tobacco, recreational drug use  Lives: at home with wife (primary care giver w/ HHA)  Ambulation: bedbound, occasional wheelchair  Need help with feeding and ADLs (25 Sep 2023 19:19)    FAMILY HISTORY:  FH: HTN (hypertension) (Father, Mother, Sibling)    Family history of bone cancer (Sibling)    FH: Alzheimers disease (Sibling)    VITAL SIGNS:  Vital Signs Last 24 Hrs  T(C): 37.1 (26 Sep 2023 01:00), Max: 37.1 (26 Sep 2023 01:00)  T(F): 98.8 (26 Sep 2023 01:00), Max: 98.8 (26 Sep 2023 01:00)  HR: 80 (26 Sep 2023 02:00) (78 - 154)  BP: 105/62 (26 Sep 2023 02:00) (55/32 - 138/67)  BP(mean): 77 (26 Sep 2023 02:00) (65 - 95)  RR: 12 (26 Sep 2023 02:00) (11 - 22)  SpO2: 99% (26 Sep 2023 02:00) (92% - 100%)    Parameters below as of 25 Sep 2023 20:26  Patient On (Oxygen Delivery Method): nasal cannula  O2 Flow (L/min): 4    PHYSICAL EXAM:  General: No acute distress, appears comfortable, well nourished, well-groomed, appears stated age  Neck: Supple, trachea in the midline  Abdomen: Soft, nondistended, Pt unable to endorse tenderness however no facial grimace upon palpation; No guarding, no rebound ttp  Extremity: No swelling, or open sores  Neuro: Unable to participate in exam  Skin: Good color, turgor, texture with no gross lesions, no eruptions, no rashes, no subcutaneous nodules and normal temperature.     LABS:                      14.2   29.61 )-----------( 376      ( 25 Sep 2023 14:14 )             45.4     09-25    141  |  107  |  25<H>  ----------------------------<  117<H>  4.1   |  16<L>  |  1.20    Ca    7.9<L>      25 Sep 2023 22:20  Phos  4.4     09-25  Mg     1.6     09-25    TPro  5.5<L>  /  Alb  2.7<L>  /  TBili  0.5  /  DBili  x   /  AST  12<L>  /  ALT  17  /  AlkPhos  109  09-25    PT/INR - ( 25 Sep 2023 14:14 )   PT: 14.0 sec;   INR: 1.20 ratio    PTT - ( 25 Sep 2023 14:14 )  PTT:37.5 sec  Urinalysis Basic - ( 25 Sep 2023 22:20 )    Color: x / Appearance: x / SG: x / pH: x  Gluc: 117 mg/dL / Ketone: x  / Bili: x / Urobili: x   Blood: x / Protein: x / Nitrite: x   Leuk Esterase: x / RBC: x / WBC x   Sq Epi: x / Non Sq Epi: x / Bacteria: x    LIVER FUNCTIONS - ( 25 Sep 2023 22:20 )  Alb: 2.7 g/dL / Pro: 5.5 g/dL / ALK PHOS: 109 U/L / ALT: 17 U/L / AST: 12 U/L / GGT: x           RADIOLOGY & ADDITIONAL STUDIES:  ACC: [id number] EXAM:  CT ABDOMEN AND PELVIS IC   ORDERED BY: RUSSELL NGUYEN   ACC: [id number] EXAM:  CT CHEST IC   ORDERED BY: RUSSELL NGUYEN   PROCEDURE DATE:  09/25/2023    COMPARISON: 8/12/2023.    FINDINGS:  CHEST:  LUNGS AND LARGE AIRWAYS: Retained secretions in the central airways.   Bibasilar airspace opacities.  PLEURA: Small pleural effusions.  VESSELS: Within normal limits.  HEART: Heart size is normal.  Trace pericardial effusion.  MEDIASTINUM AND ALYSSA: No lymphadenopathy.  CHEST WALL AND LOWER NECK: Within normal limits.    ABDOMEN AND PELVIS:  LIVER: Within normal limits.  BILE DUCTS: Normal caliber.  GALLBLADDER: Within normal limits.  SPLEEN: Multiple hypodense lesions.  PANCREAS: Within normal limits.  ADRENALS: Within normal limits.  KIDNEYS/URETERS: Cysts and other lesions too small to characterize.    BLADDER: Within normal limits.  REPRODUCTIVE ORGANS: The prostate is enlarged.    BOWEL: No bowel obstruction. There is moderate thickening of the   rectosigmoid colon consistent with colitis.  PERITONEUM: No ascites.  VESSELS:  Within normal limits.  RETROPERITONEUM/LYMPH NODES: No lymphadenopathy.  ABDOMINAL WALL: Small inguinal hernias.  BONES: Within normal limits.    IMPRESSION: Bibasilar airspace opacities suspicious for infection.    Rectosigmoid colitis.    --- End of Report ---  SAWYER MELGAR MD; Attending Radiologist    ASSESSMENT:  74 y/o male w/ PMH T2DM on metformin with peripheral neuropathy, HTN, HLD A-fib (on Eliquis), TBI (s/p SDH with EVAC on 9/2021), and traumatic subdural hemorrhage in 10/2022 Covid Pna, and major depression p/w vomiting and diarrhea x1 day.  General surgery consulted after rectosigmoid thickening/colitis noted on CTAP in setting of leukocytosis and lactic acidosis.  AFVSS. Pt largely unresponsive during encounter however was able to shake head "no" when asked if he was in pain.  Exam somewhat reassuring, abdomen remains soft, nondistended, and nontender (difficult to assess tenderness 2/2 baseline mental status)    PLAN:  - NPO  - Low suspicion that colitis is ischemic given physical exam, patent mesenteric vessels on CTAP, and nonbloody BMs  - F/u GI PCR; monitor BMs  - STEFAN  - Rest of care per ICU  - No emergent surgical intervention at this time, will follow

## 2023-09-25 NOTE — ED PROVIDER NOTE - CLINICAL SUMMARY MEDICAL DECISION MAKING FREE TEXT BOX
here hypotensive and tachycardic in setting of vomiting and diarrhea- multiple US guided IVs placed, fluids and empiric antibiotics started. Cardizem push / drip for rate control, CT imaging, cards consult / ICU admission.

## 2023-09-25 NOTE — CONSULT NOTE ADULT - SUBJECTIVE AND OBJECTIVE BOX
This is a 74 y/o M bedbound with CAD, Afib, HTN, HLD, BPH, peripheral neuropathy, SDH s/p craniotomy, Covid Pna, and major depression presented to the ED c/o vomiting and diarrhea, found to be in sepsis with lactate of 10 and in rapid Afib.  Of note, he was c/o constipation but started having diarrhea after stool softener today.  Per spouse, patient has been lethargic but more so today.  Had not have a BM x 6 days so she gave him Senna.  Denies fever, chills, SOB, STACK, CP or palpitations.  Per spouse, patient at times coughs after drinking water but able to eat breakfast this morning and able to take all his meds. He had previous admission for similar presentation found to have pna with septic shock. ICU consulted for afib with RVR as well as lactate of 10.       PAST MEDICAL & SURGICAL HISTORY:  TBI (traumatic brain injury)      HTN (hypertension)      Atrial fibrillation      Peripheral neuropathy      Major depression      HLD (hyperlipidemia)      CAD (coronary artery disease)      BPH (benign prostatic hyperplasia)      Pneumonia due to COVID-19 virus  2022      Hemorrhage in the brain      H/O craniotomy          Review of Systems:  Unable to obtain     Medications:    diltiazem Infusion 5 mG/Hr IV Continuous <Continuous>            pantoprazole  Injectable 40 milliGRAM(s) IV Push daily            chlorhexidine 2% Cloths 1 Application(s) Topical <User Schedule>            ICU Vital Signs Last 24 Hrs  T(C): 35.6 (25 Sep 2023 14:32), Max: 36.3 (25 Sep 2023 13:49)  T(F): 96 (25 Sep 2023 14:32), Max: 97.4 (25 Sep 2023 13:49)  HR: 129 (25 Sep 2023 18:06) (96 - 154)  BP: 106/60 (25 Sep 2023 18:06) (55/32 - 138/67)  BP(mean): --  ABP: --  ABP(mean): --  RR: 22 (25 Sep 2023 18:06) (20 - 22)  SpO2: 92% (25 Sep 2023 18:06) (92% - 100%)    O2 Parameters below as of 25 Sep 2023 18:06  Patient On (Oxygen Delivery Method): nasal cannula  O2 Flow (L/min): 5              I&O's Detail        LABS:                        14.2   29.61 )-----------( 376      ( 25 Sep 2023 14:14 )             45.4         140  |  102  |  24<H>  ----------------------------<  127<H>  4.5   |  22  |  1.40<H>    Ca    9.4      25 Sep 2023 14:14    TPro  7.7  /  Alb  3.7  /  TBili  0.7  /  DBili  x   /  AST  26  /  ALT  23  /  AlkPhos  173<H>            CAPILLARY BLOOD GLUCOSE        PT/INR - ( 25 Sep 2023 14:14 )   PT: 14.0 sec;   INR: 1.20 ratio         PTT - ( 25 Sep 2023 14:14 )  PTT:37.5 sec  Urinalysis Basic - ( 25 Sep 2023 16:05 )    Color: Yellow / Appearance: Clear / S.008 / pH: x  Gluc: x / Ketone: Negative mg/dL  / Bili: Negative / Urobili: 1.0 mg/dL   Blood: x / Protein: 30 mg/dL / Nitrite: Negative   Leuk Esterase: Negative / RBC: 1 /HPF / WBC 2 /HPF   Sq Epi: x / Non Sq Epi: x / Bacteria: Few /HPF      CULTURES:      Physical Examination:    General: contracted.     HEENT: Pupils equal, reactive to light    PULM: CTABL    CVS: Irregular. Tachycardia     ABD: Soft, nondistended, nontender, normoactive bowel sounds    EXT: No edema    SKIN: Warm and well perfused    NEURO: non verbal. Answers yes/no questions.     < from: Xray Chest 1 View AP/PA (23 @ 14:37) >  IMPRESSION: No active infiltrates. Humeral lucencies requiring further   evaluation neoplastic involvement is a consideration    --- End of Report ---            TAMI JARQUIN MD; Attending Radiologist  This document has been electronically signed. Sep 25 2023  2:41PM    < end of copied text >

## 2023-09-25 NOTE — H&P ADULT - PROBLEM SELECTOR PLAN 3
Meet sepsis criteria elevated lactate wbc 29K elevated HR in 170   source - CT chest  A/P shows: Bibasilar airspace opacities suspicious for infection.  Rectosigmoid colitis.  - GI PCR , C DIFF toxin PCR ordered  plan as above

## 2023-09-25 NOTE — ED ADULT NURSE NOTE - OBJECTIVE STATEMENT
Patient is a 72yo male complaining of nausea vomiting diarrhea hypotension and dehydration Patient has a PMH of TBI and is bed bound. Patient BIBA from home listless and drowsy with a blood pressure of 73/45 Patient is tachycardic in triage

## 2023-09-25 NOTE — CONSULT NOTE ADULT - ASSESSMENT
Assessment/Plan:  74 y/o M with CAD, Afib, HTN, HLD, BPH, peripheral neuropathy, SDH s/p craniotomy, Covid Pna, and major depression presented to the ED c/o vomiting and diarrhea, found to be in sepsis and rapid Afib.    Rapid Afib/Sepsis  - Was recently admitted for sepsis in the setting of Pna requiring ICU admission  - With known Hx of Afib and on Eliquis half a dose.  Tachycardic this time at rates 120-130's in the setting of sepsis  - s/p IV bolus of 2.2L followed by another liter.  Can continue with IVF.  No evidence of volume overload  - s/p Cardizem 10 mg IV x 1 in ED.  Would start Cardizem gtt up to 15 mg/hr as needed  - Can hold Eliquis for now as patient is very lethargic and unable to take PO.  Would start FD Lovenox  - Had a normal TTE in 8/15/23.  No need to repeat    - Known CAD.  EKG has Q waves in areas unchanged from previous.  - Would continue statin if able to take PO. Otherwise, can hold    - BP stable and controlled but would hold home Hydralazine for now  - Monitor electrolytes, replete to keep K>4 and Mag>2  - He is at risk of decompensations.  Recommend ICU eval  - Will follow closey    Allison Perez DNP, NP-C, AGACNP-C  Cardiology  Call TEAMS

## 2023-09-25 NOTE — H&P ADULT - PROBLEM SELECTOR PLAN 2
- Per wife, she was crushing metformin tablet as pill was too big for pt to swallow   -  Diabetic on Metformin. Lactate of 14 on admission.  May have component of GORDON.   - Continue to trend Lactate and renal fx. If true metformin associated, observe for hypoglycemia.   - Afebrile. Leukocytosis to 28k. Given 3.2L NS.   - CXR: No active infiltrates. Humeral lucencies requiring further evaluation neoplastic involvement is a consideration. Follow-up with bone scan and/or MR.  - CT chest  A/P shows: Bibasilar airspace opacities suspicious for infection.  Rectosigmoid colitis.  -  Given zoysn/vanco in ED. Will continue Zoysn. No evidence of resistant organism previously.   - On LR @ 100cc   - STAT ABG's   - F/u blood cultures.   - Care per ICU

## 2023-09-25 NOTE — H&P ADULT - HISTORY OF PRESENT ILLNESS
72 y/o M with PMHx DM2 with peripheral neuropathy, HTN, HLD A-fib (on Eliquis), TBI (s/p SDH with EVAC on 9/2021), and traumatic subdural hemorrhage in 10/2022 Covid Pna, and major depression presented to the ED c/o vomiting and diarrhea, found to be in sepsis with lactate of 10 and in rapid Afib.  Of note, he was c/o constipation but started having diarrhea after stool softener today.  Per spouse, patient has been lethargic but more so today.  Had not have a BM x 6 days so she gave him Senna.  Denies fever, chills, SOB, STACK, CP or palpitations.  Per spouse, patient at times coughs after drinking water but able to eat breakfast this morning and able to take all his meds. He had previous admission for similar presentation found to have pna with septic shock. ICU consulted for Afib with RVR as well as lactacidosis       IN ED  VITALS  /60  RR 22 sp02 92 % ON 5 L NC   PERTINENT LABS: Wbc 29 K neutrophil predominant 92% BUN/CR: 24/1.4  blood glucose 127 ALK po4 173 lactate 10.3 --> 14   CXR: No active infiltrates. Humeral lucencies requiring further evaluation neoplastic involvement is a consideration. Follow-up with bone scan and/or MR.    CT chest  A/P and CT head ordered   EKG: A fib with RVR 128bpm QTc 458 ms   ICU transfer for A fib with RVR  and lactacidosis    74 y/o M with PMHx DM2 on metformin with peripheral neuropathy, HTN, HLD A-fib (on Eliquis), TBI (s/p SDH with EVAC on 9/2021), and traumatic subdural hemorrhage in 10/2022 Covid Pna, and major depression presented to the ED with c/o vomiting and diarrhea started today. Patient is AA0x1 sometimes answer questions. Hx taken from wife ( HCP) Love Jarrell, reports he was doing fine till today, when he started vomiting non stop with liquid BM for atleast 1 1/2 hrs this am. Reports she crushed metformin at home, as pill is too big to swallow. At home, he has home aide 10/hr per day, bedbound at baseline. In ER, pt found to be in sepsis with lactate of 14 and in rapid Afib.  Patient took am dose of Eliquis.    Per wife, he has constipation for past 6 days but started having diarrhea after stool softener Senna x1 given yesterday .Per spouse, patient has been lethargic but more so today  Denies fever, chills, SOB, STACK, CP or palpitations.    Past  admission for similar presentation found to have PNA  with septic shock. ICU consulted for Afib with RVR as well as lactacidosis       IN ED  VITALS  /60  RR 22 sp02 92 % ON 5 L NC   PERTINENT LABS: Wbc 29 K neutrophil predominant 92% BUN/CR: 24/1.4  blood glucose 127 ALK po4 173 lactate 10.3 --> 14   CXR: No active infiltrates. Humeral lucencies requiring further evaluation neoplastic involvement is a consideration. Follow-up with bone scan and/or MR.    CT chest  A/P and CT head ordered   EKG: A fib with RVR 128bpm QTc 458 ms   ICU transfer for A fib with RVR  and lactacidosis   s/p Cardizem 10 mg IV X1 Zofran 4mg IV X1 zosyn x1 vanco x1  3200cc NS and Cardizem infusion 225ML/HR  in ER      72 y/o M with PMHx T2DM on metformin with peripheral neuropathy, HTN, HLD A-fib (on Eliquis), TBI (s/p SDH with EVAC on 9/2021), and traumatic subdural hemorrhage in 10/2022 Covid Pna, and major depression presented to the ED with c/o vomiting and diarrhea started today. Patient is AA0x1 sometimes answer questions. Hx taken from wife ( HCP) Love Jarrell, reports he was doing fine till today, when he started vomiting non stop with liquid BM for at least 1 1/2 hrs this am. Reports she crushed metformin at home, as pill is too big to swallow. At home, he has home aide 10/hr per day, bedbound at baseline. In ER, pt found to be in sepsis with lactate of 14 and in rapid Afib.  Patient took am dose of Eliquis.  Per wife, he has constipation for past 6 days but started having diarrhea after stool softener Senna x1 given yesterday .Per spouse, patient has been lethargic but more so today  Denies fever, chills, SOB, STACK, CP or palpitations.    Past  admission for similar presentation found to have PNA  with septic shock. ICU consulted for Afib with RVR as well as lactacidosis       IN ED  VITALS  /60  RR 22 sp02 92 % ON 5 L NC   PERTINENT LABS: Wbc 29 K neutrophil predominant 92% BUN/CR: 24/1.4  blood glucose 127 ALK po4 173 lactate 10.3 --> 14   CXR: No active infiltrates. Humeral lucencies requiring further evaluation neoplastic involvement is a consideration. Follow-up with bone scan and/or MR.    CT chest  A/P and CT head ordered   EKG: A fib with RVR 128bpm QTc 458 ms   ICU transfer for A fib with RVR  and lactacidosis   s/p Cardizem 10 mg IV X1 Zofran 4mg IV X1 zosyn x1 vanco x1  3200cc NS and Cardizem infusion 225ML/HR  in ER

## 2023-09-25 NOTE — H&P ADULT - ASSESSMENT
72 y/o M with PMHx DM2 on metformin with peripheral neuropathy, HTN, HLD A-fib (on Eliquis), TBI (s/p SDH with EVAC on 9/2021), and traumatic subdural hemorrhage in 10/2022 Covid Pna, and major depression presented to the ED with c/o vomiting and diarrhea started today. CT chest  A/P shows: Bibasilar airspace opacities suspicious for infection.  Rectosigmoid colitis. Admitted for Afib with RVR, Lactemia (GORDON/SHERLYN?) and Sepsis 2/2  Rectosigmoid colitis in  ICU

## 2023-09-26 LAB
ANION GAP SERPL CALC-SCNC: 14 MMOL/L — SIGNIFICANT CHANGE UP (ref 5–17)
APTT BLD: 173.1 SEC — CRITICAL HIGH (ref 24.5–35.6)
APTT BLD: 25.3 SEC — SIGNIFICANT CHANGE UP (ref 24.5–35.6)
APTT BLD: > 200 SEC (ref 24.5–35.6)
BUN SERPL-MCNC: 25 MG/DL — HIGH (ref 7–23)
CALCIUM SERPL-MCNC: 7.9 MG/DL — LOW (ref 8.5–10.1)
CHLORIDE SERPL-SCNC: 105 MMOL/L — SIGNIFICANT CHANGE UP (ref 96–108)
CO2 SERPL-SCNC: 20 MMOL/L — LOW (ref 22–31)
CREAT SERPL-MCNC: 1.4 MG/DL — HIGH (ref 0.5–1.3)
CULTURE RESULTS: NO GROWTH — SIGNIFICANT CHANGE UP
EGFR: 54 ML/MIN/1.73M2 — LOW
GI PCR PANEL: SIGNIFICANT CHANGE UP
GLUCOSE SERPL-MCNC: 139 MG/DL — HIGH (ref 70–99)
HCT VFR BLD CALC: 32.4 % — LOW (ref 39–50)
HCT VFR BLD CALC: 33.5 % — LOW (ref 39–50)
HGB BLD-MCNC: 10.3 G/DL — LOW (ref 13–17)
HGB BLD-MCNC: 11 G/DL — LOW (ref 13–17)
LACTATE SERPL-SCNC: 5.4 MMOL/L — CRITICAL HIGH (ref 0.7–2)
LACTATE SERPL-SCNC: 6.9 MMOL/L — CRITICAL HIGH (ref 0.7–2)
MAGNESIUM SERPL-MCNC: 1.6 MG/DL — SIGNIFICANT CHANGE UP (ref 1.6–2.6)
MCHC RBC-ENTMCNC: 25.6 PG — LOW (ref 27–34)
MCHC RBC-ENTMCNC: 26.2 PG — LOW (ref 27–34)
MCHC RBC-ENTMCNC: 31.8 GM/DL — LOW (ref 32–36)
MCHC RBC-ENTMCNC: 32.8 GM/DL — SIGNIFICANT CHANGE UP (ref 32–36)
MCV RBC AUTO: 79.8 FL — LOW (ref 80–100)
MCV RBC AUTO: 80.4 FL — SIGNIFICANT CHANGE UP (ref 80–100)
MRSA PCR RESULT.: DETECTED
NRBC # BLD: 0 /100 WBCS — SIGNIFICANT CHANGE UP (ref 0–0)
NRBC # BLD: 0 /100 WBCS — SIGNIFICANT CHANGE UP (ref 0–0)
PHOSPHATE SERPL-MCNC: 4.3 MG/DL — SIGNIFICANT CHANGE UP (ref 2.5–4.5)
PLATELET # BLD AUTO: 270 K/UL — SIGNIFICANT CHANGE UP (ref 150–400)
PLATELET # BLD AUTO: 287 K/UL — SIGNIFICANT CHANGE UP (ref 150–400)
POTASSIUM SERPL-MCNC: 4.1 MMOL/L — SIGNIFICANT CHANGE UP (ref 3.5–5.3)
POTASSIUM SERPL-SCNC: 4.1 MMOL/L — SIGNIFICANT CHANGE UP (ref 3.5–5.3)
PROCALCITONIN SERPL-MCNC: 5.36 NG/ML — HIGH
RBC # BLD: 4.03 M/UL — LOW (ref 4.2–5.8)
RBC # BLD: 4.2 M/UL — SIGNIFICANT CHANGE UP (ref 4.2–5.8)
RBC # FLD: 21 % — HIGH (ref 10.3–14.5)
RBC # FLD: 21 % — HIGH (ref 10.3–14.5)
S AUREUS DNA NOSE QL NAA+PROBE: DETECTED
SODIUM SERPL-SCNC: 139 MMOL/L — SIGNIFICANT CHANGE UP (ref 135–145)
SPECIMEN SOURCE: SIGNIFICANT CHANGE UP
WBC # BLD: 21.65 K/UL — HIGH (ref 3.8–10.5)
WBC # BLD: 23.19 K/UL — HIGH (ref 3.8–10.5)
WBC # FLD AUTO: 21.65 K/UL — HIGH (ref 3.8–10.5)
WBC # FLD AUTO: 23.19 K/UL — HIGH (ref 3.8–10.5)

## 2023-09-26 PROCEDURE — 99233 SBSQ HOSP IP/OBS HIGH 50: CPT | Mod: GC

## 2023-09-26 PROCEDURE — 99291 CRITICAL CARE FIRST HOUR: CPT

## 2023-09-26 PROCEDURE — 73060 X-RAY EXAM OF HUMERUS: CPT | Mod: 26

## 2023-09-26 RX ORDER — SODIUM BICARBONATE 1 MEQ/ML
0.27 SYRINGE (ML) INTRAVENOUS
Qty: 150 | Refills: 0 | Status: DISCONTINUED | OUTPATIENT
Start: 2023-09-25 | End: 2023-09-26

## 2023-09-26 RX ORDER — LATANOPROST 0.05 MG/ML
1 SOLUTION/ DROPS OPHTHALMIC; TOPICAL AT BEDTIME
Refills: 0 | Status: DISCONTINUED | OUTPATIENT
Start: 2023-09-26 | End: 2023-09-30

## 2023-09-26 RX ORDER — SODIUM CHLORIDE 9 MG/ML
1000 INJECTION, SOLUTION INTRAVENOUS
Refills: 0 | Status: DISCONTINUED | OUTPATIENT
Start: 2023-09-26 | End: 2023-09-27

## 2023-09-26 RX ORDER — ACETAMINOPHEN 500 MG
1000 TABLET ORAL ONCE
Refills: 0 | Status: DISCONTINUED | OUTPATIENT
Start: 2023-09-26 | End: 2023-09-26

## 2023-09-26 RX ORDER — DILTIAZEM HCL 120 MG
60 CAPSULE, EXT RELEASE 24 HR ORAL EVERY 6 HOURS
Refills: 0 | Status: DISCONTINUED | OUTPATIENT
Start: 2023-09-26 | End: 2023-09-27

## 2023-09-26 RX ORDER — MAGNESIUM SULFATE 500 MG/ML
2 VIAL (ML) INJECTION ONCE
Refills: 0 | Status: COMPLETED | OUTPATIENT
Start: 2023-09-26 | End: 2023-09-26

## 2023-09-26 RX ORDER — MUPIROCIN 20 MG/G
1 OINTMENT TOPICAL
Refills: 0 | Status: DISCONTINUED | OUTPATIENT
Start: 2023-09-26 | End: 2023-09-30

## 2023-09-26 RX ADMIN — HEPARIN SODIUM 800 UNIT(S)/HR: 5000 INJECTION INTRAVENOUS; SUBCUTANEOUS at 06:07

## 2023-09-26 RX ADMIN — PIPERACILLIN AND TAZOBACTAM 25 GRAM(S): 4; .5 INJECTION, POWDER, LYOPHILIZED, FOR SOLUTION INTRAVENOUS at 05:27

## 2023-09-26 RX ADMIN — PANTOPRAZOLE SODIUM 40 MILLIGRAM(S): 20 TABLET, DELAYED RELEASE ORAL at 12:06

## 2023-09-26 RX ADMIN — Medication 5 MG/HR: at 06:42

## 2023-09-26 RX ADMIN — Medication 25 GRAM(S): at 05:26

## 2023-09-26 RX ADMIN — Medication 100 MEQ/KG/HR: at 00:22

## 2023-09-26 RX ADMIN — LATANOPROST 1 DROP(S): 0.05 SOLUTION/ DROPS OPHTHALMIC; TOPICAL at 22:10

## 2023-09-26 RX ADMIN — HEPARIN SODIUM 800 UNIT(S)/HR: 5000 INJECTION INTRAVENOUS; SUBCUTANEOUS at 22:04

## 2023-09-26 RX ADMIN — PIPERACILLIN AND TAZOBACTAM 25 GRAM(S): 4; .5 INJECTION, POWDER, LYOPHILIZED, FOR SOLUTION INTRAVENOUS at 22:02

## 2023-09-26 RX ADMIN — HEPARIN SODIUM 0 UNIT(S)/HR: 5000 INJECTION INTRAVENOUS; SUBCUTANEOUS at 12:59

## 2023-09-26 RX ADMIN — HEPARIN SODIUM 4000 UNIT(S): 5000 INJECTION INTRAVENOUS; SUBCUTANEOUS at 22:10

## 2023-09-26 RX ADMIN — SODIUM CHLORIDE 75 MILLILITER(S): 9 INJECTION, SOLUTION INTRAVENOUS at 12:11

## 2023-09-26 RX ADMIN — HEPARIN SODIUM 600 UNIT(S)/HR: 5000 INJECTION INTRAVENOUS; SUBCUTANEOUS at 19:01

## 2023-09-26 RX ADMIN — MUPIROCIN 1 APPLICATION(S): 20 OINTMENT TOPICAL at 17:00

## 2023-09-26 RX ADMIN — Medication 5 MG/HR: at 05:57

## 2023-09-26 RX ADMIN — HEPARIN SODIUM 600 UNIT(S)/HR: 5000 INJECTION INTRAVENOUS; SUBCUTANEOUS at 14:03

## 2023-09-26 RX ADMIN — HEPARIN SODIUM 800 UNIT(S)/HR: 5000 INJECTION INTRAVENOUS; SUBCUTANEOUS at 07:10

## 2023-09-26 RX ADMIN — HEPARIN SODIUM 0 UNIT(S)/HR: 5000 INJECTION INTRAVENOUS; SUBCUTANEOUS at 05:03

## 2023-09-26 RX ADMIN — PIPERACILLIN AND TAZOBACTAM 25 GRAM(S): 4; .5 INJECTION, POWDER, LYOPHILIZED, FOR SOLUTION INTRAVENOUS at 13:20

## 2023-09-26 RX ADMIN — CHLORHEXIDINE GLUCONATE 1 APPLICATION(S): 213 SOLUTION TOPICAL at 06:00

## 2023-09-26 NOTE — PROGRESS NOTE ADULT - SUBJECTIVE AND OBJECTIVE BOX
Patient is a 73y old  Male who presents with a chief complaint of A- fib with RVR and lactic acidosis (26 Sep 2023 12:56)      Subjective:  INTERVAL HPI/OVERNIGHT EVENTS: Patient seen and examined at bedside. No overnight events occurred. History and ROS limited due to patient lethargy, questionable mental status. Able to only answer occasional questions with one-word responses. Denies pain.    MEDICATIONS  (STANDING):  chlorhexidine 2% Cloths 1 Application(s) Topical <User Schedule>  diltiazem Infusion 5 mG/Hr (5 mL/Hr) IV Continuous <Continuous>  heparin  Infusion.  Unit(s)/Hr (10 mL/Hr) IV Continuous <Continuous>  influenza  Vaccine (HIGH DOSE) 0.7 milliLiter(s) IntraMuscular once  lactated ringers. 1000 milliLiter(s) (75 mL/Hr) IV Continuous <Continuous>  latanoprost 0.005% Ophthalmic Solution 1 Drop(s) Both EYES at bedtime  mupirocin 2% Nasal 1 Application(s) Both Nostrils two times a day  pantoprazole  Injectable 40 milliGRAM(s) IV Push daily  piperacillin/tazobactam IVPB.. 3.375 Gram(s) IV Intermittent every 8 hours    MEDICATIONS  (PRN):  heparin   Injectable 4000 Unit(s) IV Push every 6 hours PRN For aPTT less than 40  heparin   Injectable 2000 Unit(s) IV Push every 6 hours PRN For aPTT between 40 - 57      Allergies    No Known Allergies    Intolerances        REVIEW OF SYSTEMS:  Limited d/t patient lethargy, questionable mental status    Objective:  Vital Signs Last 24 Hrs  T(C): 36.8 (26 Sep 2023 12:11), Max: 37.1 (26 Sep 2023 01:00)  T(F): 98.2 (26 Sep 2023 12:11), Max: 98.8 (26 Sep 2023 01:00)  HR: 100 (26 Sep 2023 14:00) (78 - 154)  BP: 140/70 (26 Sep 2023 14:00) (91/53 - 140/70)  BP(mean): 97 (26 Sep 2023 14:00) (65 - 97)  RR: 10 (26 Sep 2023 14:00) (10 - 22)  SpO2: 100% (26 Sep 2023 14:00) (92% - 100%)    Parameters below as of 26 Sep 2023 08:00  Patient On (Oxygen Delivery Method): nasal cannula  O2 Flow (L/min): 3      GENERAL: ill-appearing, cachectic  HEAD:  Atraumatic, Normocephalic  EYES: PERRL, conjunctiva and sclera clear  ENT: Dry mucous membranes  NECK: Supple, No JVD  CHEST/LUNG: Clear to auscultation bilaterally; No rales, rhonchi, wheezing, or rubs. Unlabored respirations  HEART: Regular rate and rhythm; No murmurs, rubs, or gallops  ABDOMEN: Bowel sounds present; Soft, Nontender, Nondistended. No hepatomegaly  EXTREMITIES:  2+ DP Pulses, brisk capillary refill. No clubbing, cyanosis, or edema  NERVOUS SYSTEM: Lethargic, only sporadically answers questions with one-word responses. Rousable. Questionable mental status, unable to answer A+O questions.  MSK: Upper extremities with flexion posturing. Lower extremities normal tone.    LABS:                        11.0   23.19 )-----------( 270      ( 26 Sep 2023 04:37 )             33.5     CBC Full  -  ( 26 Sep 2023 04:37 )  WBC Count : 23.19 K/uL  Hemoglobin : 11.0 g/dL  Hematocrit : 33.5 %  Platelet Count - Automated : 270 K/uL  Mean Cell Volume : 79.8 fl  Mean Cell Hemoglobin : 26.2 pg  Mean Cell Hemoglobin Concentration : 32.8 gm/dL  Auto Neutrophil # : x  Auto Lymphocyte # : x  Auto Monocyte # : x  Auto Eosinophil # : x  Auto Basophil # : x  Auto Neutrophil % : x  Auto Lymphocyte % : x  Auto Monocyte % : x  Auto Eosinophil % : x  Auto Basophil % : x    26 Sep 2023 04:37    139    |  105    |  25     ----------------------------<  139    4.1     |  20     |  1.40     Ca    7.9        26 Sep 2023 04:37  Phos  4.3       26 Sep 2023 04:37  Mg     1.6       26 Sep 2023 04:37    TPro  5.5    /  Alb  2.7    /  TBili  0.5    /  DBili  x      /  AST  12     /  ALT  17     /  AlkPhos  109    25 Sep 2023 22:20    PT/INR - ( 25 Sep 2023 14:14 )   PT: 14.0 sec;   INR: 1.20 ratio         PTT - ( 26 Sep 2023 11:47 )  PTT:173.1 sec  Urinalysis Basic - ( 26 Sep 2023 04:37 )    Color: x / Appearance: x / SG: x / pH: x  Gluc: 139 mg/dL / Ketone: x  / Bili: x / Urobili: x   Blood: x / Protein: x / Nitrite: x   Leuk Esterase: x / RBC: x / WBC x   Sq Epi: x / Non Sq Epi: x / Bacteria: x      CAPILLARY BLOOD GLUCOSE      POCT Blood Glucose.: 120 mg/dL (26 Sep 2023 12:10)          RADIOLOGY & ADDITIONAL TESTS:    Personally reviewed.     Consultant(s) Notes Reviewed:  [x] YES  [ ] NO

## 2023-09-26 NOTE — PROGRESS NOTE ADULT - ASSESSMENT
72 y/o M w/ pmh of TBI from brain bleed, Afib, CAD, T2DM, presented to [facility] for n/v/d. Admitted to ICU for severe sepsis likely 2/2 rectosigmoid colitis, resulting in AFib with RVR and anion gap metabolic acidosis.     Problem List:  1. Afib w/ RVR  2. Lactic acidosis  3. Sepsis  4. LATOYA on CKD vs CKD  5. DM2  6. HTN  7. TBI Hx  8. New humeral lucencies    Neuro:  -AOx1, likely septic encephalopathy  -Hx of TBI s/p SDH w/ EVAC 9/2021 and traumatic SDH 10/2022  -Holding home modafinil i/s/o MS changes    CV:  -A/w Afib w/ RVR i/s/o likely septic shock, rates 120-130 up to 170 on admission, currently controlled  -Cardizem gtt, plan to wean and transition to PO  -Heparin gtt for AC  -Lactate 14 on admission, downtrending  -Goal MAP >65  -Hx HTN, holding home hydralizine  -Cardio Dr. Sepulveda following, normal TTE 8/15/23 and currently no need for repeat    Pulm:  -SpO2 >92% on NC  -CXR 9/25: neg acute CP process, +humeral lucencies    GI:  -Diet: NPO, DASH when able  -Presented w/ n/v to ED  -CTAP 9/25 w/ rectosigmoid colitis  -FU GI PCR, C Diff toxin    Renal:   -Metabolic gap acidosis 2/2 lactic acidosis w/ appropriate resp compensation  -Questionable Cr baseline ~1.4, CKD vs ATN 2/2 prerenal azotemia  -Replete lytes prn; Mg >2, Phos >3  -Condom cath, monitor UOP  -D/C bicarb gtt    Heme:   -Monitor h/h  -DVT ppx: on hep gtt for AC  -Home eliquis, holding for hep    ID:  -Likely sepsis i/s/o leukocytosis to 28k w/ HR 170s, s/p 3.2L NS in ED  -FU BCx  -Empiric Zoysn (9/25- )  -Monitor WBC, temp; currently afebrile    Endo:  -Hx DM2, holding home metformin i/s/o lactic acidosis  -FSG q6    MSK:  -CXR read w/ humeral lucencies, FU XR BL humerus, consider bone scan/MR   74 y/o M w/ pmh of TBI from brain bleed, Afib, CAD, T2DM, presented to [facility] for n/v/d. Admitted to ICU for severe sepsis likely 2/2 rectosigmoid colitis, resulting in AFib with RVR and anion gap metabolic acidosis.     Problem List:  1. Afib w/ RVR  2. Lactic acidosis  3. Sepsis  4. LATOYA on CKD vs CKD  5. DM2  6. HTN  7. TBI Hx  8. New humeral lucencies    Neuro:  -baseline is AOx1  today pt is lethargic, only responds to pain, likely metabolic encephalopathy vs hypoactive delirium  -Hx of TBI s/p SDH w/ EVAC 9/2021 and traumatic SDH 10/2022  head CT results: no new or worsening hemorrhage  -Holding home modafinil in setting of mental status changes    CV:  -Afib w/ RVR likely 2/2 septic shock, currently controlled  -cont cardizem gtt, plan to wean and transition to PO  -Heparin gtt for AC  -Lactate 14 on admission, downtrending  -Goal MAP >65  -Hx HTN, holding home hydralazine  -Cardio Dr. Seuplveda following, normal TTE 8/15/23 and currently no need for repeat    Pulm:  -goal SpO2 >92%   currently on 3L NC  -CXR 9/25: neg acute CP process, +humeral lucencies   CT chest: bibasilar air space opacities, suspicious for infection  Possible aspiration pneumonia, but pt clinically stable      Renal:   -anion gap metabolic acidosis 2/2 lactic acidosis w/ appropriate resp compensation  -Cr baseline ~1.4, CKD   -Cr currently at baseline  -Replete lytes prn; Mg >2, Phos >3  -Condom cath, monitor UOP  -D/C bicarb gtt  -start LR at 75cc/hr    GI:  -Diet: NPO, DASH when able  -Presented w/ n/v/d to ED  -Pt had no BM since admission  -CTAP 9/25 w/ rectosigmoid colitis  -Colitis possibly secondary to infectious cause vs ischemic cause vs inflammatory cause  -GI PCR negative, FU C Diff toxin  -follow up on surgical consult    ID:  -Severe sepsis likely from rectosigmoid colitis vs questionable aspiration pneumonia  -leukocytosis to 28k w/ HR 170s, s/p 3.2L NS in ED  -leukocytosis improving, lactose downtrending 10 -> 14 -> 6 -> 5.4  -FU BCx  -Empiric Zoysn (9/25- )  -MRSA PCR positive, start mupirocin ointment nares   -Monitor WBC, temp; currently afebrile    Heme:   -Monitor h/h, appears pt baseline hemoglobin is ~10  -on heparin gtt for AC  -previously on home eliquis for A-fib      Endo:  -Hx DM2, holding home metformin i/s/o lactic acidosis  -FSG q6    MSK:  -CXR read w/ humeral lucencies  -FU XR BL humerus, consider bone scan/MR

## 2023-09-26 NOTE — PROVIDER CONTACT NOTE (CRITICAL VALUE NOTIFICATION) - ACTION/TREATMENT ORDERED:
Dr. Reynaga aware. Heparin nomogram followed, Heparin gtt to be stopped for 60 mins then restart at 600 units/hr
Heparin drip held for 1hr restart at 800units hr.
Dr. Reynaga and team made aware

## 2023-09-26 NOTE — PROGRESS NOTE ADULT - SUBJECTIVE AND OBJECTIVE BOX
SURGERY PA NOTE ON BEHALF OF DR. HE:    S: Patient seen and examined at bedside.   Patient seen and evaluated in the ICU.  Patient able to identify himself, unable to provide ROS.  In discussion with ICU team and RN staff, patient had not had a BM overnight.      MEDICATIONS:  chlorhexidine 2% Cloths 1 Application(s) Topical <User Schedule>  diltiazem Infusion 5 mG/Hr IV Continuous <Continuous>  heparin   Injectable 4000 Unit(s) IV Push every 6 hours PRN  heparin   Injectable 2000 Unit(s) IV Push every 6 hours PRN  heparin  Infusion.  Unit(s)/Hr IV Continuous <Continuous>  influenza  Vaccine (HIGH DOSE) 0.7 milliLiter(s) IntraMuscular once  pantoprazole  Injectable 40 milliGRAM(s) IV Push daily  piperacillin/tazobactam IVPB.. 3.375 Gram(s) IV Intermittent every 8 hours  sodium bicarbonate  Infusion 0.273 mEq/kG/Hr IV Continuous <Continuous>      O:  Vital Signs Last 24 Hrs  T(C): 36.8 (26 Sep 2023 08:18), Max: 37.1 (26 Sep 2023 01:00)  T(F): 98.2 (26 Sep 2023 08:18), Max: 98.8 (26 Sep 2023 01:00)  HR: 89 (26 Sep 2023 08:00) (78 - 154)  BP: 116/65 (26 Sep 2023 08:00) (55/32 - 138/67)  BP(mean): 84 (26 Sep 2023 08:00) (65 - 95)  RR: 12 (26 Sep 2023 08:00) (10 - 22)  SpO2: 99% (26 Sep 2023 08:00) (92% - 100%)    Parameters below as of 26 Sep 2023 08:00  Patient On (Oxygen Delivery Method): nasal cannula  O2 Flow (L/min): 3      I&O SUMMARY:    09-25-23 @ 07:01  -  09-26-23 @ 07:00  --------------------------------------------------------  IN: 1540.5 mL / OUT: 300 mL / NET: 1240.5 mL    09-26-23 @ 07:01  -  09-26-23 @ 09:00  --------------------------------------------------------  IN: 113 mL / OUT: 0 mL / NET: 113 mL        PHYSICAL EXAM:  Lungs: CTA bilat without W/R/R  Card: S1S2  Abd: Soft, NT, ND.  +BS x 4.  No rebound/guarding.    Ext: Calves soft, NT, without edema bilat    LABS:                        11.0   23.19 )-----------( 270      ( 26 Sep 2023 04:37 )             33.5     09-26    139  |  105  |  25<H>  ----------------------------<  139<H>  4.1   |  20<L>  |  1.40<H>    Ca    7.9<L>      26 Sep 2023 04:37  Phos  4.3     09-26  Mg     1.6     09-26    TPro  5.5<L>  /  Alb  2.7<L>  /  TBili  0.5  /  DBili  x   /  AST  12<L>  /  ALT  17  /  AlkPhos  109  09-25    PT/INR - ( 25 Sep 2023 14:14 )   PT: 14.0 sec;   INR: 1.20 ratio         PTT - ( 26 Sep 2023 04:00 )  PTT:> 200 sec        Assessment:  74 y/o male w/ PMH T2DM on metformin with peripheral neuropathy, HTN, HLD A-fib (on Eliquis), TBI (s/p SDH with EVAC on 9/2021), and traumatic subdural hemorrhage in 10/2022 Covid Pna, and major depression admitted to ICU for severe lactic acidosis and Afib w/ RVR.    CT A/P suggestive of rectosigmoid thickening, consistent with colitis  Lactate improving (6.9) following resuscitation.    Heart rate down trending, now resting in 80's  Leukocytosis persistent (23k)  Abdominal exam soft and non-tender       Plan:  - At this time, patient clinically improving with reassuring abdominal exam  - Recommend NPO  - GI PCR  - STEFAN   - surgery to follow  - To discuss w/ Dr. He

## 2023-09-26 NOTE — PROGRESS NOTE ADULT - PROBLEM SELECTOR PLAN 7
CXR: Humeral lucencies requiring further evaluation neoplastic involvement is a consideration.   B/L humeral XR ordered for f/u

## 2023-09-26 NOTE — PROGRESS NOTE ADULT - PROBLEM SELECTOR PLAN 3
Pt requesting RX for chantix for smoking cessation.    On admission  BUN/CR: 24/1.4  : Baseline 0.6-0.8, per chart review   Likely ATN 2/2 prerenal azotemia from dehydration and decreased perfusion  - Recommend Nephro consult  - s/p 3L IVF in ED. Recommend NS continuous until lactate decreases. No volume over load on exam   - Further management per ICU.

## 2023-09-26 NOTE — PROGRESS NOTE ADULT - PROBLEM SELECTOR PLAN 2
Meet sepsis criteria elevated lactate wbc 29K elevated HR in 170   source - CT chest  A/P shows: Bibasilar airspace opacities suspicious for infection.  Rectosigmoid colitis.  - CXR: No active infiltrates. Humeral lucencies requiring further evaluation neoplastic involvement is a consideration. Follow-up with bone scan and/or MR.  - CT chest  A/P shows: Bibasilar airspace opacities suspicious for infection.  Rectosigmoid colitis.  -  Diabetic on Metformin. Lactate of 14 on admission.  May have component of GORDON.   - Lactate down to 5.4.  - Continue to trend Lactate and renal fx. If true metformin associated, observe for hypoglycemia.   - Afebrile. Leukocytosis to 23.19k today. Given 3.2L NS.   - GI PCR , C DIFF toxin PCR ordered  - Recommend uPNA, Legionella  - F/u blood cultures  - MRSA positive, Bactroban  - Care per ICU Meet sepsis criteria elevated lactate, leukocytosis, tachycardia   source - CT chest  A/P shows: Bibasilar airspace opacities suspicious for infection.  Rectosigmoid colitis.  - CXR: No active infiltrates. Humeral lucencies requiring further evaluation neoplastic involvement is a consideration. Follow-up with bone scan and/or MR.  - CT chest  A/P shows: Bibasilar airspace opacities suspicious for infection.  Rectosigmoid colitis.  -  Diabetic on Metformin. Lactate of 14 on admission.  May have component of GORDON.   - Lactate down to 5.4.  - Continue to trend Lactate and renal fx. If true metformin associated, observe for hypoglycemia.   - Afebrile. Leukocytosis to 23.19k today. Given 3.2L NS.   - GI PCR , C DIFF toxin PCR ordered  - Recommend uPNA, Legionella  - F/u blood cultures  - MRSA positive, Bactroban  - Care per ICU

## 2023-09-26 NOTE — PROGRESS NOTE ADULT - ASSESSMENT
74 y/o M with PMHx DM2 on metformin with peripheral neuropathy, HTN, HLD A-fib (on Eliquis), TBI (s/p SDH with EVAC on 9/2021), and traumatic subdural hemorrhage in 10/2022 Covid Pna, and major depression presented to the ED with c/o vomiting and diarrhea started today. CT chest  A/P shows: Bibasilar airspace opacities suspicious for infection.  Rectosigmoid colitis. Admitted for Afib with RVR, Lactemia (GORDON/SHERLYN?) and Sepsis 2/2  Rectosigmoid colitis in  ICU

## 2023-09-26 NOTE — PROGRESS NOTE ADULT - SUBJECTIVE AND OBJECTIVE BOX
INTERVAL HPI/OVERNIGHT EVENTS:    SUBJECTIVE: Patient seen and examined at bedside.     ROS:  CV: Denies chest pain  Resp: Denies SOB  GI: Denies abdominal pain, constipation, diarrhea, nausea, vomiting  : Denies dysuria  ID: Denies fevers, chills  MSK: Denies joint pain     OBJECTIVE:    VITAL SIGNS:  ICU Vital Signs Last 24 Hrs  T(C): 36.8 (26 Sep 2023 12:11), Max: 37.1 (26 Sep 2023 01:00)  T(F): 98.2 (26 Sep 2023 12:11), Max: 98.8 (26 Sep 2023 01:00)  HR: 94 (26 Sep 2023 12:00) (78 - 154)  BP: 127/71 (26 Sep 2023 12:00) (55/32 - 138/67)  BP(mean): 93 (26 Sep 2023 12:00) (65 - 95)  ABP: --  ABP(mean): --  RR: 11 (26 Sep 2023 12:00) (10 - 22)  SpO2: 99% (26 Sep 2023 12:00) (92% - 100%)    O2 Parameters below as of 26 Sep 2023 08:00  Patient On (Oxygen Delivery Method): nasal cannula  O2 Flow (L/min): 3            09-25 @ 07:01 - 09-26 @ 07:00  --------------------------------------------------------  IN: 1540.5 mL / OUT: 300 mL / NET: 1240.5 mL    09-26 @ 07:01  -  09-26 @ 12:57  --------------------------------------------------------  IN: 440 mL / OUT: 0 mL / NET: 440 mL      CAPILLARY BLOOD GLUCOSE      POCT Blood Glucose.: 120 mg/dL (26 Sep 2023 12:10)      PHYSICAL EXAM:  General: NAD, comfortable  HEENT: NCAT, clear conjunctiva, no scleral icterus  Respiratory: CTA b/l, no wheezing, rhonchi, rales  Cardiovascular: RRR, normal S1S2, no M/R/G  Abdomen: soft, NT/ND, bowel sounds present  Extremities: no LE edema or calf tenderness  Neurology: awake and alert    MEDICATIONS:  MEDICATIONS  (STANDING):  chlorhexidine 2% Cloths 1 Application(s) Topical <User Schedule>  diltiazem Infusion 5 mG/Hr (5 mL/Hr) IV Continuous <Continuous>  heparin  Infusion.  Unit(s)/Hr (10 mL/Hr) IV Continuous <Continuous>  influenza  Vaccine (HIGH DOSE) 0.7 milliLiter(s) IntraMuscular once  lactated ringers. 1000 milliLiter(s) (75 mL/Hr) IV Continuous <Continuous>  latanoprost 0.005% Ophthalmic Solution 1 Drop(s) Both EYES at bedtime  mupirocin 2% Nasal 1 Application(s) Both Nostrils two times a day  pantoprazole  Injectable 40 milliGRAM(s) IV Push daily  piperacillin/tazobactam IVPB.. 3.375 Gram(s) IV Intermittent every 8 hours    MEDICATIONS  (PRN):  heparin   Injectable 4000 Unit(s) IV Push every 6 hours PRN For aPTT less than 40  heparin   Injectable 2000 Unit(s) IV Push every 6 hours PRN For aPTT between 40 - 57      ALLERGIES:  Allergies    No Known Allergies    Intolerances        LABS:                        11.0   23.19 )-----------( 270      ( 26 Sep 2023 04:37 )             33.5     09-26    139  |  105  |  25<H>  ----------------------------<  139<H>  4.1   |  20<L>  |  1.40<H>    Ca    7.9<L>      26 Sep 2023 04:37  Phos  4.3     09-26  Mg     1.6     09-26    TPro  5.5<L>  /  Alb  2.7<L>  /  TBili  0.5  /  DBili  x   /  AST  12<L>  /  ALT  17  /  AlkPhos  109  09-25    PT/INR - ( 25 Sep 2023 14:14 )   PT: 14.0 sec;   INR: 1.20 ratio         PTT - ( 26 Sep 2023 11:47 )  PTT:173.1 sec  Urinalysis Basic - ( 26 Sep 2023 04:37 )    Color: x / Appearance: x / SG: x / pH: x  Gluc: 139 mg/dL / Ketone: x  / Bili: x / Urobili: x   Blood: x / Protein: x / Nitrite: x   Leuk Esterase: x / RBC: x / WBC x   Sq Epi: x / Non Sq Epi: x / Bacteria: x        RADIOLOGY & ADDITIONAL TESTS: Reviewed. ***  BEDSIDE LUNG ULTRASOUND: ***  BEDSIDE ECHO: ***    CENTRAL LINE: N        DATE INSERTED:             REMOVE: N  OAKLEY: Y                       DATE INSERTED:              REMOVE: Y/N  A-LINE: N                       DATE INSERTED:              REMOVE: Y/N      GLOBAL ISSUE/BEST PRACTICE  Analgesia: Y  Sedation: Y  HOB elevation: yes  Stress ulcer prophylaxis: Y  VTE prophylaxis: Y  Glycemic control: Y  Nutrition: Y    CODE STATUS: Full Code INTERVAL HPI/OVERNIGHT EVENTS: rectal temp 95.6F overnight, improved w/o intervention.     SUBJECTIVE: Patient seen and examined at bedside. Pt not responding to voice, but grimaces upon mild pain. Pt appears comfortable.     ROS:  Unable to obtain ROS secondary to mental status.     OBJECTIVE:    VITAL SIGNS:  ICU Vital Signs Last 24 Hrs  T(C): 36.8 (26 Sep 2023 12:11), Max: 37.1 (26 Sep 2023 01:00)  T(F): 98.2 (26 Sep 2023 12:11), Max: 98.8 (26 Sep 2023 01:00)  HR: 94 (26 Sep 2023 12:00) (78 - 154)  BP: 127/71 (26 Sep 2023 12:00) (55/32 - 138/67)  BP(mean): 93 (26 Sep 2023 12:00) (65 - 95)  ABP: --  ABP(mean): --  RR: 11 (26 Sep 2023 12:00) (10 - 22)  SpO2: 99% (26 Sep 2023 12:00) (92% - 100%)    O2 Parameters below as of 26 Sep 2023 08:00  Patient On (Oxygen Delivery Method): nasal cannula  O2 Flow (L/min): 3            09-25 @ 07:01  -  09-26 @ 07:00  --------------------------------------------------------  IN: 1540.5 mL / OUT: 300 mL / NET: 1240.5 mL    09-26 @ 07:01  -  09-26 @ 12:57  --------------------------------------------------------  IN: 440 mL / OUT: 0 mL / NET: 440 mL      CAPILLARY BLOOD GLUCOSE      POCT Blood Glucose.: 120 mg/dL (26 Sep 2023 12:10)      PHYSICAL EXAM:  General: NAD, comfortable, unresponsive to voice, grimaces to pain. On 3L nasal cannula.  HEENT: NCAT, PERRLA   Respiratory: CTA b/l, no wheezing, rhonchi, rales  Cardiovascular: distant heart sounds, irregular rhythm  Abdomen: soft, ND, tender to deep palpation, condom catheter in place.  Extremities: no LE edema or calf tenderness. Arms contracted.  Neurology: Pt unresponsive to voice, grimaces to pain.    MEDICATIONS:  MEDICATIONS  (STANDING):  chlorhexidine 2% Cloths 1 Application(s) Topical <User Schedule>  diltiazem Infusion 5 mG/Hr (5 mL/Hr) IV Continuous <Continuous>  heparin  Infusion.  Unit(s)/Hr (10 mL/Hr) IV Continuous <Continuous>  influenza  Vaccine (HIGH DOSE) 0.7 milliLiter(s) IntraMuscular once  lactated ringers. 1000 milliLiter(s) (75 mL/Hr) IV Continuous <Continuous>  latanoprost 0.005% Ophthalmic Solution 1 Drop(s) Both EYES at bedtime  mupirocin 2% Nasal 1 Application(s) Both Nostrils two times a day  pantoprazole  Injectable 40 milliGRAM(s) IV Push daily  piperacillin/tazobactam IVPB.. 3.375 Gram(s) IV Intermittent every 8 hours    MEDICATIONS  (PRN):  heparin   Injectable 4000 Unit(s) IV Push every 6 hours PRN For aPTT less than 40  heparin   Injectable 2000 Unit(s) IV Push every 6 hours PRN For aPTT between 40 - 57      ALLERGIES:  Allergies    No Known Allergies    Intolerances        LABS:                        11.0   23.19 )-----------( 270      ( 26 Sep 2023 04:37 )             33.5     09-26    139  |  105  |  25<H>  ----------------------------<  139<H>  4.1   |  20<L>  |  1.40<H>    Ca    7.9<L>      26 Sep 2023 04:37  Phos  4.3     09-26  Mg     1.6     09-26    TPro  5.5<L>  /  Alb  2.7<L>  /  TBili  0.5  /  DBili  x   /  AST  12<L>  /  ALT  17  /  AlkPhos  109  09-25    PT/INR - ( 25 Sep 2023 14:14 )   PT: 14.0 sec;   INR: 1.20 ratio         PTT - ( 26 Sep 2023 11:47 )  PTT:173.1 sec  Urinalysis Basic - ( 26 Sep 2023 04:37 )    Color: x / Appearance: x / SG: x / pH: x  Gluc: 139 mg/dL / Ketone: x  / Bili: x / Urobili: x   Blood: x / Protein: x / Nitrite: x   Leuk Esterase: x / RBC: x / WBC x   Sq Epi: x / Non Sq Epi: x / Bacteria: x        RADIOLOGY & ADDITIONAL TESTS: Reviewed.   < from: CT Chest w/ IV Cont (09.25.23 @ 19:55) >    ACC: [id number] EXAM:  CT ABDOMEN AND PELVIS IC   ORDERED BY: RUSSELL NGUYEN     ACC: [id number] EXAM:  CT CHEST IC   ORDERED BY: RUSSELL NGUYEN     PROCEDURE DATE:  09/25/2023          INTERPRETATION:  CLINICAL INFORMATION: sepsis Admitting Dxs: I48.91   UNSPECIFIED ATRIAL FIBRILLATION PCT    COMPARISON: 8/12/2023.    CONTRAST/COMPLICATIONS:  IV Contrast: Omnipaque 350 (accession [id number]), IV contrast documented   in unlinked concurrent exam (accession [id number])  90 cc administered   10   cc discarded  Oral Contrast: NONE  Complications: None reported at time of study completion    PROCEDURE:  CT of the Chest, Abdomen and Pelvis was performed.  Sagittal and coronal reformats were performed.    FINDINGS:  CHEST:  LUNGS AND LARGE AIRWAYS: Retained secretions in the central airways.   Bibasilar airspace opacities.  PLEURA: Small pleural effusions.  VESSELS: Within normal limits.  HEART: Heart size is normal.  Trace pericardial effusion.  MEDIASTINUM AND ALYSSA: No lymphadenopathy.  CHEST WALL AND LOWER NECK: Within normal limits.    ABDOMEN AND PELVIS:  LIVER: Within normal limits.  BILE DUCTS: Normal caliber.  GALLBLADDER: Within normal limits.  SPLEEN: Multiple hypodense lesions.  PANCREAS: Within normal limits.  ADRENALS: Within normal limits.  KIDNEYS/URETERS: Cysts and other lesions too small to characterize.    BLADDER: Within normal limits.  REPRODUCTIVE ORGANS: The prostate is enlarged.    BOWEL: No bowel obstruction. There is moderate thickening of the   rectosigmoid colon consistent with colitis.  PERITONEUM: No ascites.  VESSELS:  Within normal limits.  RETROPERITONEUM/LYMPH NODES: No lymphadenopathy.  ABDOMINAL WALL: Small inguinal hernias.  BONES: Within normal limits.    IMPRESSION: Bibasilar airspace opacities suspicious for infection.    Rectosigmoid colitis.    --- End of Report ---            SAWYER MELGAR MD; Attending Radiologist  This document has been electronically signed. Sep 25 2023  8:07PM    < end of copied text >    < from: CT Head No Cont (09.25.23 @ 19:51) >    ACC: [id number] EXAM:  CT BRAIN   ORDERED BY: RUSSELL NGUYEN     PROCEDURE DATE:  09/25/2023          INTERPRETATION:  CLINICAL INDICATION: Sepsis.    TECHNIQUE: CT of the head was performed without the administration of   intravenous contrast.    COMPARISON: CT head 8/13/2023.    FINDINGS:  High right parietal craniotomy is again seen.    Thin hyperdensity along the posterior falx is slightly decreased in   prominence, representing residual subdural hemorrhage. No new or   worsening intracranial hemorrhage identified.    Encephalomalacia/gliosis in the right temporal pole and inferior frontal   lobes, most compatible with chronic traumatic brain injury. Unchanged   white matter hypoattenuation in bilateral frontoparietal white matter.    Moderate hydrocephalus is unchanged.    The visualized intraorbital contents are unremarkable. The imaged   portions of the paranasal sinuses are clear. The mastoid air cells are   clear.    IMPRESSION:    -Decreased prominence of posterior parafalcine subdural hematoma. No new   or worsening hemorrhage identified.    -Stable moderate hydrocephalus.    --- End of Report ---            ANGELINA UREÑA MD; Attending Radiologist  This document has been electronically signed. Sep 25 2023  9:28PM    < end of copied text >        BEDSIDE ECHO:  Large right atrium, reduced contractility  IVC: 1.5 cm w/ variation from respiratory motion.     CENTRAL LINE: N        DATE INSERTED:             REMOVE: N  OAKLEY: N                       DATE INSERTED:              REMOVE: Y/N  A-LINE: N                       DATE INSERTED:              REMOVE: Y/N      GLOBAL ISSUE/BEST PRACTICE  Analgesia: N  Sedation: N  HOB elevation: yes  Stress ulcer prophylaxis: Y  VTE prophylaxis: Y  Glycemic control: Y  Nutrition: Y    CODE STATUS: Full Code INTERVAL HPI/OVERNIGHT EVENTS: rectal temp 95.6F overnight    SUBJECTIVE: Patient seen and examined at bedside. Pt not responding to voice, but grimaces upon mild pain. Pt appears comfortable.     ROS:  Unable to obtain ROS secondary to mental status.     OBJECTIVE:    VITAL SIGNS:  ICU Vital Signs Last 24 Hrs  T(C): 36.8 (26 Sep 2023 12:11), Max: 37.1 (26 Sep 2023 01:00)  T(F): 98.2 (26 Sep 2023 12:11), Max: 98.8 (26 Sep 2023 01:00)  HR: 94 (26 Sep 2023 12:00) (78 - 154)  BP: 127/71 (26 Sep 2023 12:00) (55/32 - 138/67)  BP(mean): 93 (26 Sep 2023 12:00) (65 - 95)  ABP: --  ABP(mean): --  RR: 11 (26 Sep 2023 12:00) (10 - 22)  SpO2: 99% (26 Sep 2023 12:00) (92% - 100%)    O2 Parameters below as of 26 Sep 2023 08:00  Patient On (Oxygen Delivery Method): nasal cannula  O2 Flow (L/min): 3            09-25 @ 07:01  -  09-26 @ 07:00  --------------------------------------------------------  IN: 1540.5 mL / OUT: 300 mL / NET: 1240.5 mL    09-26 @ 07:01  -  09-26 @ 12:57  --------------------------------------------------------  IN: 440 mL / OUT: 0 mL / NET: 440 mL      CAPILLARY BLOOD GLUCOSE      POCT Blood Glucose.: 120 mg/dL (26 Sep 2023 12:10)      PHYSICAL EXAM:  General: chronically ill, bedbound, grimaces to pain. On 3L nasal cannula.  HEENT: NCAT, PERRLA   Respiratory: CTA b/l, no wheezing, rhonchi, rales  Cardiovascular: distant heart sounds, irregular rhythm  Abdomen: soft, ND, tender to deep palpation, condom catheter in place.  Extremities: no LE edema or calf tenderness. Arms contracted.  Neurology: Pt unresponsive to voice, grimaces to pain.    MEDICATIONS:  MEDICATIONS  (STANDING):  chlorhexidine 2% Cloths 1 Application(s) Topical <User Schedule>  diltiazem Infusion 5 mG/Hr (5 mL/Hr) IV Continuous <Continuous>  heparin  Infusion.  Unit(s)/Hr (10 mL/Hr) IV Continuous <Continuous>  influenza  Vaccine (HIGH DOSE) 0.7 milliLiter(s) IntraMuscular once  lactated ringers. 1000 milliLiter(s) (75 mL/Hr) IV Continuous <Continuous>  latanoprost 0.005% Ophthalmic Solution 1 Drop(s) Both EYES at bedtime  mupirocin 2% Nasal 1 Application(s) Both Nostrils two times a day  pantoprazole  Injectable 40 milliGRAM(s) IV Push daily  piperacillin/tazobactam IVPB.. 3.375 Gram(s) IV Intermittent every 8 hours    MEDICATIONS  (PRN):  heparin   Injectable 4000 Unit(s) IV Push every 6 hours PRN For aPTT less than 40  heparin   Injectable 2000 Unit(s) IV Push every 6 hours PRN For aPTT between 40 - 57      ALLERGIES:  Allergies    No Known Allergies    Intolerances        LABS:                        11.0   23.19 )-----------( 270      ( 26 Sep 2023 04:37 )             33.5     09-26    139  |  105  |  25<H>  ----------------------------<  139<H>  4.1   |  20<L>  |  1.40<H>    Ca    7.9<L>      26 Sep 2023 04:37  Phos  4.3     09-26  Mg     1.6     09-26    TPro  5.5<L>  /  Alb  2.7<L>  /  TBili  0.5  /  DBili  x   /  AST  12<L>  /  ALT  17  /  AlkPhos  109  09-25    PT/INR - ( 25 Sep 2023 14:14 )   PT: 14.0 sec;   INR: 1.20 ratio         PTT - ( 26 Sep 2023 11:47 )  PTT:173.1 sec  Urinalysis Basic - ( 26 Sep 2023 04:37 )    Color: x / Appearance: x / SG: x / pH: x  Gluc: 139 mg/dL / Ketone: x  / Bili: x / Urobili: x   Blood: x / Protein: x / Nitrite: x   Leuk Esterase: x / RBC: x / WBC x   Sq Epi: x / Non Sq Epi: x / Bacteria: x        RADIOLOGY & ADDITIONAL TESTS: Reviewed.   < from: CT Chest w/ IV Cont (09.25.23 @ 19:55) >    ACC: [id number] EXAM:  CT ABDOMEN AND PELVIS IC   ORDERED BY: RUSSELL NGUYEN     ACC: [id number] EXAM:  CT CHEST IC   ORDERED BY: RUSSELL NGUYEN     PROCEDURE DATE:  09/25/2023          INTERPRETATION:  CLINICAL INFORMATION: sepsis Admitting Dxs: I48.91   UNSPECIFIED ATRIAL FIBRILLATION PCT    COMPARISON: 8/12/2023.    CONTRAST/COMPLICATIONS:  IV Contrast: Omnipaque 350 (accession [id number]), IV contrast documented   in unlinked concurrent exam (accession [id number])  90 cc administered   10   cc discarded  Oral Contrast: NONE  Complications: None reported at time of study completion    PROCEDURE:  CT of the Chest, Abdomen and Pelvis was performed.  Sagittal and coronal reformats were performed.    FINDINGS:  CHEST:  LUNGS AND LARGE AIRWAYS: Retained secretions in the central airways.   Bibasilar airspace opacities.  PLEURA: Small pleural effusions.  VESSELS: Within normal limits.  HEART: Heart size is normal.  Trace pericardial effusion.  MEDIASTINUM AND ALYSSA: No lymphadenopathy.  CHEST WALL AND LOWER NECK: Within normal limits.    ABDOMEN AND PELVIS:  LIVER: Within normal limits.  BILE DUCTS: Normal caliber.  GALLBLADDER: Within normal limits.  SPLEEN: Multiple hypodense lesions.  PANCREAS: Within normal limits.  ADRENALS: Within normal limits.  KIDNEYS/URETERS: Cysts and other lesions too small to characterize.    BLADDER: Within normal limits.  REPRODUCTIVE ORGANS: The prostate is enlarged.    BOWEL: No bowel obstruction. There is moderate thickening of the   rectosigmoid colon consistent with colitis.  PERITONEUM: No ascites.  VESSELS:  Within normal limits.  RETROPERITONEUM/LYMPH NODES: No lymphadenopathy.  ABDOMINAL WALL: Small inguinal hernias.  BONES: Within normal limits.    IMPRESSION: Bibasilar airspace opacities suspicious for infection.    Rectosigmoid colitis.    --- End of Report ---            SAWYER MELGAR MD; Attending Radiologist  This document has been electronically signed. Sep 25 2023  8:07PM    < end of copied text >    < from: CT Head No Cont (09.25.23 @ 19:51) >    ACC: [id number] EXAM:  CT BRAIN   ORDERED BY: RUSSELL NGUYEN     PROCEDURE DATE:  09/25/2023          INTERPRETATION:  CLINICAL INDICATION: Sepsis.    TECHNIQUE: CT of the head was performed without the administration of   intravenous contrast.    COMPARISON: CT head 8/13/2023.    FINDINGS:  High right parietal craniotomy is again seen.    Thin hyperdensity along the posterior falx is slightly decreased in   prominence, representing residual subdural hemorrhage. No new or   worsening intracranial hemorrhage identified.    Encephalomalacia/gliosis in the right temporal pole and inferior frontal   lobes, most compatible with chronic traumatic brain injury. Unchanged   white matter hypoattenuation in bilateral frontoparietal white matter.    Moderate hydrocephalus is unchanged.    The visualized intraorbital contents are unremarkable. The imaged   portions of the paranasal sinuses are clear. The mastoid air cells are   clear.    IMPRESSION:    -Decreased prominence of posterior parafalcine subdural hematoma. No new   or worsening hemorrhage identified.    -Stable moderate hydrocephalus.    --- End of Report ---            ANGELINA UREÑA MD; Attending Radiologist  This document has been electronically signed. Sep 25 2023  9:28PM    < end of copied text >        BEDSIDE ECHO:  Large right atrium, reduced contractility  IVC: 1.5 cm without respiratory variation from respiratory motion.     CENTRAL LINE: N        DATE INSERTED:             REMOVE: N  OAKLEY: N                       DATE INSERTED:              REMOVE: Y/N  A-LINE: N                       DATE INSERTED:              REMOVE: Y/N      GLOBAL ISSUE/BEST PRACTICE  Analgesia: N  Sedation: N  HOB elevation: yes  Stress ulcer prophylaxis: Y  VTE prophylaxis: Y  Glycemic control: Y  Nutrition: Y    CODE STATUS: Full Code

## 2023-09-26 NOTE — PROGRESS NOTE ADULT - PROBLEM SELECTOR PLAN 1
-  Patient on admission with a fib wit RVR  at rates 120-130's , at times ranges >170's in the setting of sepsis  -  EKG: A fib with RVR 128bpm QTc 458 ms    - s/p Cardizem 10 mg IV X1 Zofran 4mg IV X1 zosyn x1 vanco x1  3200cc NS and Cardizem infusion 225ML/HR  in ER  - Continue Cardizem infusion, can tritiate as needed  - No evidence of volume overload  - Can hold Eliquis for now as patient is very lethargic and unable to take PO.  Would start FD Lovenox per cardio   - Had a normal TTE in 8/15/23.  No need to repeat  - Started on heparin gtt   - Cardio following Dr Sepulveda, following  - Care per ICU -  Patient on admission with a fib wit RVR  at rates 120-130's , at times ranges >170's in the setting of sepsis  -  EKG: A fib with RVR 128bpm QTc 458 ms    - s/p Cardizem 10 mg IV X1 Zofran 4mg IV X1 zosyn x1 vanco x1  3200cc NS and Cardizem infusion 225ML/HR  in ER  - Continue Cardizem infusion, can tritiate as needed  - No evidence of volume overload  - Can hold Eliquis for now as patient is very lethargic and unable to take PO  - Had a normal TTE in 8/15/23.  No need to repeat  - Started on heparin gtt   - Cardio following Dr Sepulveda, following  - Care per ICU

## 2023-09-26 NOTE — PROGRESS NOTE ADULT - ASSESSMENT
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 Pt was found to be in rapid afib w/ RVR, severe lactic acidosis and hypotension now improved s/p IVF.  Pt was admitted to the MICU for further treatment and management.      -Neuro: Metabolic encephalopathy cont to monitor MS closely  -Cardiac: Rapid Afib w/ RVR now w/ improvement, Cardizem decreased from 20mg---->5mg, will cont until pt can be transitioned to PO Cardizem goal is to maintain rate control, maintain MAP >65  -Resp: No acute issues o2 stable  -GI: Maintain NPO status  -Renal: LATOYA w/ severe lactic/metabolic acidosis IVF changed from LR--->bicarb gtt at 100cc/hr, cont to monitor UOP and cont to trend renal function and lytes  -ID: pna cont IVAB, f/u on cx data, Sigmoid colitis on CT will consult surgery for eval given high lactic acidosis  -Endo: no acute issues  -Heme: DVT ppx start full dose heparin gtt given Afib  -Dispo: Pt remains in the MICU, DNR /w trial of intubation, dispo pending ICU course

## 2023-09-26 NOTE — PROGRESS NOTE ADULT - SUBJECTIVE AND OBJECTIVE BOX
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 Pt was found to be in rapid afib w/ RVR, severe lactic acidosis and hypotension now improved s/p IVF.  Pt was admitted to the MICU for further treatment and management.    24 hour events: Tonight pt LA levels increased to 14, pt was started on IVF at LR 100cc/hr w/ improvement in LA to 9, repeat labs obtained now noted to have a lactic/metabolic acidosis IVF changed from LR to bicarb gtt, pt also started on heparin gtt for afib, and cardizem gtt decreased from 20mg to 5mg w/ improvement in HR.    Review of Systems: Unable to obtain ROS     T(F): 98.4 (09-25-23 @ 23:00), Max: 98.4 (09-25-23 @ 23:00)  HR: 83 (09-25-23 @ 23:00) (83 - 154)  BP: 96/57 (09-25-23 @ 23:00) (55/32 - 138/67)  RR: 18 (09-25-23 @ 23:00) (11 - 22)  SpO2: 98% (09-25-23 @ 23:00) (92% - 100%)  Wt(kg): --      09-25-23 @ 07:01  -  09-26-23 @ 00:33  --------------------------------------------------------  IN: 482.5 mL / OUT: 0 mL / NET: 482.5 mL        CAPILLARY BLOOD GLUCOSE          I&O's Summary    25 Sep 2023 07:01  -  26 Sep 2023 00:33  --------------------------------------------------------  IN: 482.5 mL / OUT: 0 mL / NET: 482.5 mL        Physical Exam:   Gen: Comfortable in bed in NAD  Neuro: AAOx1 at baseline  HEENT: NC/AT  Resp: good air entry b/l  CVS: +RRR  Abd: BSx4, soft, NT/ND  Ext: no edema   Skin: warm/dry    Meds:  piperacillin/tazobactam IVPB.. IV Intermittent    diltiazem Infusion IV Continuous            heparin   Injectable IV Push PRN  heparin   Injectable IV Push PRN  heparin  Infusion. IV Continuous    pantoprazole  Injectable IV Push      sodium bicarbonate  Infusion IV Continuous    influenza  Vaccine (HIGH DOSE) IntraMuscular    chlorhexidine 2% Cloths Topical                              14.2   29.61 )-----------( 376      ( 25 Sep 2023 14:14 )             45.4     Bands 1.0    09-25    141  |  107  |  25<H>  ----------------------------<  117<H>  4.1   |  16<L>  |  1.20    Ca    7.9<L>      25 Sep 2023 22:20  Phos  4.4     09-25  Mg     1.6     09-25    TPro  5.5<L>  /  Alb  2.7<L>  /  TBili  0.5  /  DBili  x   /  AST  12<L>  /  ALT  17  /  AlkPhos  109  09-25    Lactate 9.8           09-25 @ 22:20    Lactate 14.0           09-25 @ 16:43    Lactate 10.3           09-25 @ 14:14          PT/INR - ( 25 Sep 2023 14:14 )   PT: 14.0 sec;   INR: 1.20 ratio         PTT - ( 25 Sep 2023 14:14 )  PTT:37.5 sec  Urinalysis Basic - ( 25 Sep 2023 22:20 )    Color: x / Appearance: x / SG: x / pH: x  Gluc: 117 mg/dL / Ketone: x  / Bili: x / Urobili: x   Blood: x / Protein: x / Nitrite: x   Leuk Esterase: x / RBC: x / WBC x   Sq Epi: x / Non Sq Epi: x / Bacteria: x          Radiology:     < from: CT Abdomen and Pelvis w/ IV Cont (09.25.23 @ 19:57) >  ACC: [id number] EXAM:  CT ABDOMEN AND PELVIS IC   ORDERED BY: RUSSELL NGUYEN     ACC: [id number] EXAM:  CT CHEST IC   ORDERED BY: RUSSELL NGUYEN     PROCEDURE DATE:  09/25/2023          INTERPRETATION:  CLINICAL INFORMATION: sepsis Admitting Dxs: I48.91   UNSPECIFIED ATRIAL FIBRILLATION PCT    COMPARISON: 8/12/2023.    CONTRAST/COMPLICATIONS:  IV Contrast: Omnipaque 350 (accession [id number]), IV contrast documented   in unlinked concurrent exam (accession [id number])  90 cc administered   10   cc discarded  Oral Contrast: NONE  Complications: None reported at time of study completion    PROCEDURE:  CT of the Chest, Abdomen and Pelvis was performed.  Sagittal and coronal reformats were performed.    FINDINGS:  CHEST:  LUNGS AND LARGE AIRWAYS: Retained secretions in the central airways.   Bibasilar airspace opacities.  PLEURA: Small pleural effusions.  VESSELS: Within normal limits.  HEART: Heart size is normal.  Trace pericardial effusion.  MEDIASTINUM AND ALYSSA: No lymphadenopathy.  CHEST WALL AND LOWER NECK: Within normal limits.    ABDOMEN AND PELVIS:  LIVER: Within normal limits.  BILE DUCTS: Normal caliber.  GALLBLADDER: Within normal limits.  SPLEEN: Multiple hypodense lesions.  PANCREAS: Within normal limits.  ADRENALS: Within normal limits.  KIDNEYS/URETERS: Cysts and other lesions too small to characterize.    BLADDER: Within normal limits.  REPRODUCTIVE ORGANS: The prostate is enlarged.    BOWEL: No bowel obstruction. There is moderate thickening of the   rectosigmoid colon consistent with colitis.  PERITONEUM: No ascites.  VESSELS:  Within normal limits.  RETROPERITONEUM/LYMPH NODES: No lymphadenopathy.  ABDOMINAL WALL: Small inguinal hernias.  BONES: Within normal limits.    IMPRESSION: Bibasilar airspace opacities suspicious for infection.    Rectosigmoid colitis.    --- End of Report ---      SAWYER MELGAR MD; Attending Radiologist  This document has been electronically signed. Sep 25 2023  8:07PM    < end of copied text >      < from: CT Head No Cont (09.25.23 @ 19:51) >  ACC: [id number] EXAM:  CT BRAIN   ORDERED BY: RUSSELL NGUYEN     PROCEDURE DATE:  09/25/2023          INTERPRETATION:  CLINICAL INDICATION: Sepsis.    TECHNIQUE: CT of the head was performed without the administration of   intravenous contrast.    COMPARISON: CT head 8/13/2023.    FINDINGS:  High right parietal craniotomy is again seen.    Thin hyperdensity along the posterior falx is slightly decreased in   prominence, representing residual subdural hemorrhage. No new or   worsening intracranial hemorrhage identified.    Encephalomalacia/gliosis in the right temporal pole and inferior frontal   lobes, most compatible with chronic traumatic brain injury. Unchanged   white matter hypoattenuation in bilateral frontoparietal white matter.    Moderate hydrocephalus is unchanged.    The visualized intraorbital contents are unremarkable. The imaged   portions of the paranasal sinuses are clear. The mastoid air cells are   clear.    IMPRESSION:    -Decreased prominence of posterior parafalcine subdural hematoma. No new   or worsening hemorrhage identified.    -Stable moderate hydrocephalus.    --- End of Report ---        ANGELINA UREÑA MD; Attending Radiologist  This document has been electronically signed. Sep 25 2023  9:28PM    < end of copied text >        CODE STATUS: DNR w/ trial of intubation    CRITICAL CARE TIME SPENT: 40 mins  (Assessing presenting problems of acute illness, which pose high probability of life threatening deterioration or end organ damage/dysfunction, as well as medical decision making including initiating plan of care, reviewing data, reviewing radiologic exams, discussing with multidisciplinary team,  discussing goals of care with patient/family, and writing this note.  Non-inclusive of procedures performed)

## 2023-09-26 NOTE — PROGRESS NOTE ADULT - ASSESSMENT
74 y/o M with CAD, Afib, HTN, HLD, BPH, peripheral neuropathy, SDH s/p craniotomy, Covid Pna, and major depression presented to the ED c/o vomiting and diarrhea, found to be in sepsis and rapid Afib.    Rapid Afib/Sepsis  - Was recently admitted for sepsis in the setting of Pna requiring ICU admission  - With known Hx of Afib and on Eliquis half a dose.    - rates were quite high, now improved after ivf and dilt gtt   - Can hold Eliquis for now and continue heparin gtt    -no clear vol ol  - Had TTE which was tls but overall preserved ef in 8/15/23.  No need to repeat    -no acute ischemia  - presumed CAD on the basis of inferior q waves, but no definite cad  - Would resume statin when able    - BP stable and controlled but would hold home Hydralazine for now  - Monitor electrolytes, replete to keep K>4 and Mag>2     Upon my evaluation, this patient is at high risk for imminent or life threatening deterioration due to rapid af, hypotension,  and other active medical issues which require my direct attention, intervention, and personal management.  I have personally spent >35 minutes  of critical care time exclusive of time spent on separate billing procedures. This includes review of laboratory data, radiology results, discussion with primary team\patient, and monitoring for potential decompensation Interventions were performed as documented above.

## 2023-09-26 NOTE — PROGRESS NOTE ADULT - SUBJECTIVE AND OBJECTIVE BOX
[facility]    Janak Edwards, Malcom, Alexey, Coco, Agus, Mick      [phone]    CHIEF COMPLAINT: Patient is a 73y old  Male who presents with a chief complaint of A- fib with RVR and lactic acidosis (26 Sep 2023 09:00)      Follow Up: sepsis and rapid af    Interim history: Unable to provide a history on the basis of a poor mental status.  Events noted.    MEDICATIONS  (STANDING):  chlorhexidine 2% Cloths 1 Application(s) Topical <User Schedule>  diltiazem Infusion 5 mG/Hr (5 mL/Hr) IV Continuous <Continuous>  heparin  Infusion.  Unit(s)/Hr (10 mL/Hr) IV Continuous <Continuous>  influenza  Vaccine (HIGH DOSE) 0.7 milliLiter(s) IntraMuscular once  lactated ringers. 1000 milliLiter(s) (75 mL/Hr) IV Continuous <Continuous>  latanoprost 0.005% Ophthalmic Solution 1 Drop(s) Both EYES at bedtime  mupirocin 2% Nasal 1 Application(s) Both Nostrils two times a day  pantoprazole  Injectable 40 milliGRAM(s) IV Push daily  piperacillin/tazobactam IVPB.. 3.375 Gram(s) IV Intermittent every 8 hours    MEDICATIONS  (PRN):  heparin   Injectable 4000 Unit(s) IV Push every 6 hours PRN For aPTT less than 40  heparin   Injectable 2000 Unit(s) IV Push every 6 hours PRN For aPTT between 40 - 57      REVIEW OF SYSTEMS: unable to provide  Vital Signs Last 24 Hrs  T(C): 36.8 (26 Sep 2023 12:11), Max: 37.1 (26 Sep 2023 01:00)  T(F): 98.2 (26 Sep 2023 12:11), Max: 98.8 (26 Sep 2023 01:00)  HR: 94 (26 Sep 2023 10:00) (78 - 154)  BP: 130/67 (26 Sep 2023 10:00) (55/32 - 138/67)  BP(mean): 90 (26 Sep 2023 10:00) (65 - 95)  RR: 13 (26 Sep 2023 10:00) (10 - 22)  SpO2: 99% (26 Sep 2023 10:00) (92% - 100%)    Parameters below as of 26 Sep 2023 08:00  Patient On (Oxygen Delivery Method): nasal cannula  O2 Flow (L/min): 3      I&O's Summary    25 Sep 2023 07:01  -  26 Sep 2023 07:00  --------------------------------------------------------  IN: 1540.5 mL / OUT: 300 mL / NET: 1240.5 mL    26 Sep 2023 07:01  -  26 Sep 2023 12:17  --------------------------------------------------------  IN: 339 mL / OUT: 0 mL / NET: 339 mL        Telemetry past 24h: af controlled vr (better controlled since 2100)    PHYSICAL EXAM:    Constitutional: well-nourished, well-developed, NAD   HEENT:  MMM, sclerae anicteric, conjunctivae clear, no oral cyanosis.  Pulmonary: Non-labored, breath sounds are clear bilaterally, No wheezing, rales or rhonchi  Cardiovascular: irregular, S1 and S2.  No murmur.  No rubs, gallops or clicks  Gastrointestinal: Bowel Sounds present, soft, nontender.   Lymph: No peripheral edema.   Neurological: unable to evaluate, poor mental status  Skin: No rashes.  Psych:  Mood & affect not evaluable    LABS: All Labs Reviewed:                        11.0   23.19 )-----------( 270      ( 26 Sep 2023 04:37 )             33.5                         10.3   21.65 )-----------( 287      ( 26 Sep 2023 04:00 )             32.4                         14.2   29.61 )-----------( 376      ( 25 Sep 2023 14:14 )             45.4     26 Sep 2023 04:37    139    |  105    |  25     ----------------------------<  139    4.1     |  20     |  1.40   25 Sep 2023 22:20    141    |  107    |  25     ----------------------------<  117    4.1     |  16     |  1.20   25 Sep 2023 14:14    140    |  102    |  24     ----------------------------<  127    4.5     |  22     |  1.40     Ca    7.9        26 Sep 2023 04:37  Ca    7.9        25 Sep 2023 22:20  Ca    9.4        25 Sep 2023 14:14  Phos  4.3       26 Sep 2023 04:37  Phos  4.4       25 Sep 2023 22:20  Mg     1.6       26 Sep 2023 04:37  Mg     1.6       25 Sep 2023 22:20    TPro  5.5    /  Alb  2.7    /  TBili  0.5    /  DBili  x      /  AST  12     /  ALT  17     /  AlkPhos  109    25 Sep 2023 22:20  TPro  7.7    /  Alb  3.7    /  TBili  0.7    /  DBili  x      /  AST  26     /  ALT  23     /  AlkPhos  173    25 Sep 2023 14:14    PT/INR - ( 25 Sep 2023 14:14 )   PT: 14.0 sec;   INR: 1.20 ratio         PTT - ( 26 Sep 2023 04:00 )  PTT:> 200 sec      Blood Culture:         RADIOLOGY:    EKG:    Echo:    < from: TTE W or WO Ultrasound Enhancing Agent (08.14.23 @ 13:57) >    TRANSTHORACIC ECHOCARDIOGRAM REPORT  ________________________________________________________________________________                                      _______       Pt. Name:       MELBA PARMAR Study Date:    8/14/2023  MRN:            [id number]       YOB: 1950  Accession #:    [id number]      Age:           73 years  Account#:       [id number]     Gender:        M  Heart Rate:                    Height:        69.00 in (175.26 cm)  Rhythm:                        Weight:  134.00 lb (60.78 kg)  Blood Pressure: 120/74 mmHg    BSA/BMI:       1.74 m² / 19.79 kg/m²  ________________________________________________________________________________________  Referring Physician:    [id number] Ranulfo Boggs  Interpreting Physician: Lexi Seals  Primary Sonographer:    Celia Whitmore RDCS    CPT:               ECHO TTE WO CON COMP W DOPP - 10941.m  Indication(s):     Shock, unspecified - R57.9  Procedure:         Transthoracic echocardiogram with 2-D, M-mode and complete                     spectral and color flow Doppler.  Ordering Location: ICU1  Study Information: Image quality for this study is technically difficult.    _______________________________________________________________________________________     CONCLUSIONS:      1. Technically difficult image quality.   2. Left ventricular systolic function is normal with an ejection fraction visually estimated at 60 to 65 %.   3. The right ventricle is not well visualized.   4. The left atrium is mildly dilated in size.   5. Aortic valve was not well visualized.   6. Trace mitral regurgitation.   7. Trace tricuspid regurgitation.    ________________________________________________________________________________________  FINDINGS:     Left Ventricle:  Left ventricular systolic function is normal with an ejection fraction visually estimated at 60 to 65%.     Right Ventricle:  The right ventricle is not well visualized. Normal wall thickness.     Left Atrium:  The left atrium is mildly dilated in size.     Right Atrium:  The right atrium is normal in size.     Aortic Valve:  The aortic valve was not well visualized.     Mitral Valve:  There is trace mitral regurgitation.     Tricuspid Valve:  There is trace tricuspid regurgitation. Estimated pulmonary artery systolic pressure is 29 mmHg.     Pulmonic Valve:  The pulmonic valve was not well visualized.     Pericardium:  No pericardial effusion seen.     Systemic Veins:  The inferior vena cava is normal in size (normal <2.1cm) with normal inspiratory collapse (normal >50%) consistent with normal right atrial pressure (~3, range 0-5mmHg).  ____________________________________________________________________  Quantitative Data:  Left Ventricle Measurements: (Indexed to BSA)     IVSd (2D):   1.2 cm  LVPWd (2D):  1.2 cm  LVIDd (2D):  4.1 cm  LVIDs (2D):  2.6 cm  LV Mass:     167 g  96.0 g/m²  Visualized LV EF%: 60 to 65%     MV E Vmax:    0.73 m/s  e' lateral:   12.00 cm/s  e' medial:    8.49 cm/s  E/e' lateral: 6.12  E/e' medial:  8.65  E/e' Average: 7.16  MV DT:        120 msec       Left Atrium Measurements: (Indexed to BSA)  LA Diam 2D: 4.10 cm       LVOT / RVOT/ Qp/Qs Data: (Indexed to BSA)  LVOT Diameter: 2.00 cm    Mitral Valve Measurements:     MV E Vmax: 0.7 m/s       Tricuspid Valve Measurements:     TR Vmax:          2.5 m/s  TR Peak Gradient: 25.6 mmHg  RA Pressure:      3 mmHg  PASP:             29 mmHg    ________________________________________________________________________________________  Electronically signed on8/15/2023 at 12:43:28 PM by Lexi Seals         *** Final ***    < end of copied text >

## 2023-09-27 ENCOUNTER — TRANSCRIPTION ENCOUNTER (OUTPATIENT)
Age: 73
End: 2023-09-27

## 2023-09-27 LAB
ANION GAP SERPL CALC-SCNC: 10 MMOL/L — SIGNIFICANT CHANGE UP (ref 5–17)
APTT BLD: 129 SEC — CRITICAL HIGH (ref 24.5–35.6)
APTT BLD: 34.7 SEC — SIGNIFICANT CHANGE UP (ref 24.5–35.6)
APTT BLD: 66.6 SEC — HIGH (ref 24.5–35.6)
BASOPHILS # BLD AUTO: 0.05 K/UL — SIGNIFICANT CHANGE UP (ref 0–0.2)
BASOPHILS NFR BLD AUTO: 0.8 % — SIGNIFICANT CHANGE UP (ref 0–2)
BUN SERPL-MCNC: 20 MG/DL — SIGNIFICANT CHANGE UP (ref 7–23)
CALCIUM SERPL-MCNC: 8.4 MG/DL — LOW (ref 8.5–10.1)
CHLORIDE SERPL-SCNC: 102 MMOL/L — SIGNIFICANT CHANGE UP (ref 96–108)
CO2 SERPL-SCNC: 29 MMOL/L — SIGNIFICANT CHANGE UP (ref 22–31)
CREAT SERPL-MCNC: 1.3 MG/DL — SIGNIFICANT CHANGE UP (ref 0.5–1.3)
EGFR: 59 ML/MIN/1.73M2 — LOW
EOSINOPHIL # BLD AUTO: 0.04 K/UL — SIGNIFICANT CHANGE UP (ref 0–0.5)
EOSINOPHIL NFR BLD AUTO: 0.6 % — SIGNIFICANT CHANGE UP (ref 0–6)
GLUCOSE SERPL-MCNC: 109 MG/DL — HIGH (ref 70–99)
HCT VFR BLD CALC: 26.8 % — LOW (ref 39–50)
HCT VFR BLD CALC: 27.9 % — LOW (ref 39–50)
HGB BLD-MCNC: 8.9 G/DL — LOW (ref 13–17)
HGB BLD-MCNC: 9.4 G/DL — LOW (ref 13–17)
IMM GRANULOCYTES NFR BLD AUTO: 0.3 % — SIGNIFICANT CHANGE UP (ref 0–0.9)
INR BLD: 1.07 RATIO — SIGNIFICANT CHANGE UP (ref 0.85–1.18)
LYMPHOCYTES # BLD AUTO: 0.88 K/UL — LOW (ref 1–3.3)
LYMPHOCYTES # BLD AUTO: 13.9 % — SIGNIFICANT CHANGE UP (ref 13–44)
MAGNESIUM SERPL-MCNC: 2.2 MG/DL — SIGNIFICANT CHANGE UP (ref 1.6–2.6)
MCHC RBC-ENTMCNC: 25.8 PG — LOW (ref 27–34)
MCHC RBC-ENTMCNC: 26.1 PG — LOW (ref 27–34)
MCHC RBC-ENTMCNC: 33.2 GM/DL — SIGNIFICANT CHANGE UP (ref 32–36)
MCHC RBC-ENTMCNC: 33.7 GM/DL — SIGNIFICANT CHANGE UP (ref 32–36)
MCV RBC AUTO: 77.5 FL — LOW (ref 80–100)
MCV RBC AUTO: 77.7 FL — LOW (ref 80–100)
MONOCYTES # BLD AUTO: 0.57 K/UL — SIGNIFICANT CHANGE UP (ref 0–0.9)
MONOCYTES NFR BLD AUTO: 9 % — SIGNIFICANT CHANGE UP (ref 2–14)
NEUTROPHILS # BLD AUTO: 4.78 K/UL — SIGNIFICANT CHANGE UP (ref 1.8–7.4)
NEUTROPHILS NFR BLD AUTO: 75.4 % — SIGNIFICANT CHANGE UP (ref 43–77)
NRBC # BLD: 0 /100 WBCS — SIGNIFICANT CHANGE UP (ref 0–0)
NRBC # BLD: 0 /100 WBCS — SIGNIFICANT CHANGE UP (ref 0–0)
PHOSPHATE SERPL-MCNC: 3.2 MG/DL — SIGNIFICANT CHANGE UP (ref 2.5–4.5)
PLATELET # BLD AUTO: 174 K/UL — SIGNIFICANT CHANGE UP (ref 150–400)
PLATELET # BLD AUTO: 212 K/UL — SIGNIFICANT CHANGE UP (ref 150–400)
POTASSIUM SERPL-MCNC: 3.2 MMOL/L — LOW (ref 3.5–5.3)
POTASSIUM SERPL-SCNC: 3.2 MMOL/L — LOW (ref 3.5–5.3)
PROTHROM AB SERPL-ACNC: 12.5 SEC — SIGNIFICANT CHANGE UP (ref 9.5–13)
RBC # BLD: 3.45 M/UL — LOW (ref 4.2–5.8)
RBC # BLD: 3.6 M/UL — LOW (ref 4.2–5.8)
RBC # FLD: 20.7 % — HIGH (ref 10.3–14.5)
RBC # FLD: 20.9 % — HIGH (ref 10.3–14.5)
SODIUM SERPL-SCNC: 141 MMOL/L — SIGNIFICANT CHANGE UP (ref 135–145)
WBC # BLD: 6.17 K/UL — SIGNIFICANT CHANGE UP (ref 3.8–10.5)
WBC # BLD: 6.34 K/UL — SIGNIFICANT CHANGE UP (ref 3.8–10.5)
WBC # FLD AUTO: 6.17 K/UL — SIGNIFICANT CHANGE UP (ref 3.8–10.5)
WBC # FLD AUTO: 6.34 K/UL — SIGNIFICANT CHANGE UP (ref 3.8–10.5)

## 2023-09-27 PROCEDURE — 99233 SBSQ HOSP IP/OBS HIGH 50: CPT | Mod: GC

## 2023-09-27 PROCEDURE — 99291 CRITICAL CARE FIRST HOUR: CPT

## 2023-09-27 RX ORDER — HEPARIN SODIUM 5000 [USP'U]/ML
600 INJECTION INTRAVENOUS; SUBCUTANEOUS
Qty: 25000 | Refills: 0 | Status: DISCONTINUED | OUTPATIENT
Start: 2023-09-27 | End: 2023-09-28

## 2023-09-27 RX ORDER — DILTIAZEM HCL 120 MG
1 CAPSULE, EXT RELEASE 24 HR ORAL
Refills: 0
Start: 2023-09-27

## 2023-09-27 RX ORDER — POTASSIUM CHLORIDE 20 MEQ
10 PACKET (EA) ORAL
Refills: 0 | Status: COMPLETED | OUTPATIENT
Start: 2023-09-27 | End: 2023-09-27

## 2023-09-27 RX ORDER — POTASSIUM CHLORIDE 20 MEQ
20 PACKET (EA) ORAL
Refills: 0 | Status: DISCONTINUED | OUTPATIENT
Start: 2023-09-27 | End: 2023-09-27

## 2023-09-27 RX ORDER — HEPARIN SODIUM 5000 [USP'U]/ML
4000 INJECTION INTRAVENOUS; SUBCUTANEOUS EVERY 6 HOURS
Refills: 0 | Status: DISCONTINUED | OUTPATIENT
Start: 2023-09-27 | End: 2023-09-27

## 2023-09-27 RX ORDER — HEPARIN SODIUM 5000 [USP'U]/ML
2000 INJECTION INTRAVENOUS; SUBCUTANEOUS EVERY 6 HOURS
Refills: 0 | Status: DISCONTINUED | OUTPATIENT
Start: 2023-09-27 | End: 2023-09-27

## 2023-09-27 RX ORDER — DILTIAZEM HCL 120 MG
60 CAPSULE, EXT RELEASE 24 HR ORAL EVERY 8 HOURS
Refills: 0 | Status: DISCONTINUED | OUTPATIENT
Start: 2023-09-27 | End: 2023-09-28

## 2023-09-27 RX ORDER — APIXABAN 2.5 MG/1
5 TABLET, FILM COATED ORAL EVERY 12 HOURS
Refills: 0 | Status: DISCONTINUED | OUTPATIENT
Start: 2023-09-27 | End: 2023-09-29

## 2023-09-27 RX ADMIN — PANTOPRAZOLE SODIUM 40 MILLIGRAM(S): 20 TABLET, DELAYED RELEASE ORAL at 11:46

## 2023-09-27 RX ADMIN — Medication 100 MILLIEQUIVALENT(S): at 11:25

## 2023-09-27 RX ADMIN — Medication 60 MILLIGRAM(S): at 13:55

## 2023-09-27 RX ADMIN — LATANOPROST 1 DROP(S): 0.05 SOLUTION/ DROPS OPHTHALMIC; TOPICAL at 21:12

## 2023-09-27 RX ADMIN — CHLORHEXIDINE GLUCONATE 1 APPLICATION(S): 213 SOLUTION TOPICAL at 04:48

## 2023-09-27 RX ADMIN — PIPERACILLIN AND TAZOBACTAM 25 GRAM(S): 4; .5 INJECTION, POWDER, LYOPHILIZED, FOR SOLUTION INTRAVENOUS at 13:11

## 2023-09-27 RX ADMIN — Medication 60 MILLIGRAM(S): at 21:11

## 2023-09-27 RX ADMIN — HEPARIN SODIUM 600 UNIT(S)/HR: 5000 INJECTION INTRAVENOUS; SUBCUTANEOUS at 14:40

## 2023-09-27 RX ADMIN — HEPARIN SODIUM 800 UNIT(S)/HR: 5000 INJECTION INTRAVENOUS; SUBCUTANEOUS at 05:07

## 2023-09-27 RX ADMIN — PIPERACILLIN AND TAZOBACTAM 25 GRAM(S): 4; .5 INJECTION, POWDER, LYOPHILIZED, FOR SOLUTION INTRAVENOUS at 05:08

## 2023-09-27 RX ADMIN — PIPERACILLIN AND TAZOBACTAM 25 GRAM(S): 4; .5 INJECTION, POWDER, LYOPHILIZED, FOR SOLUTION INTRAVENOUS at 21:12

## 2023-09-27 RX ADMIN — APIXABAN 5 MILLIGRAM(S): 2.5 TABLET, FILM COATED ORAL at 17:38

## 2023-09-27 RX ADMIN — Medication 100 MILLIEQUIVALENT(S): at 12:32

## 2023-09-27 RX ADMIN — Medication 5 MG/HR: at 05:09

## 2023-09-27 RX ADMIN — MUPIROCIN 1 APPLICATION(S): 20 OINTMENT TOPICAL at 17:38

## 2023-09-27 RX ADMIN — HEPARIN SODIUM 600 UNIT(S)/HR: 5000 INJECTION INTRAVENOUS; SUBCUTANEOUS at 07:41

## 2023-09-27 RX ADMIN — HEPARIN SODIUM 0 UNIT(S)/HR: 5000 INJECTION INTRAVENOUS; SUBCUTANEOUS at 07:06

## 2023-09-27 RX ADMIN — HEPARIN SODIUM 0 UNIT(S)/HR: 5000 INJECTION INTRAVENOUS; SUBCUTANEOUS at 06:33

## 2023-09-27 RX ADMIN — MUPIROCIN 1 APPLICATION(S): 20 OINTMENT TOPICAL at 05:09

## 2023-09-27 RX ADMIN — Medication 100 MILLIEQUIVALENT(S): at 11:46

## 2023-09-27 NOTE — PROGRESS NOTE ADULT - SUBJECTIVE AND OBJECTIVE BOX
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 CT chest  A/P shows: Bibasilar airspace opacities suspicious for infection.  Rectosigmoid colitis. Admitted for Afib with RVR, Lactemia (GORDON/SHERLYN?) and Sepsis 2/2  Rectosigmoid colitis in  ICU    (27 Sep 2023 15:12)      Subjective:  INTERVAL HPI/OVERNIGHT EVENTS: Patient seen and examined at bedside. No overnight events occurred. History limited due to patient currently nonverbal, lethargic.    MEDICATIONS  (STANDING):  apixaban 5 milliGRAM(s) Oral every 12 hours  chlorhexidine 2% Cloths 1 Application(s) Topical <User Schedule>  diltiazem    Tablet 60 milliGRAM(s) Oral every 8 hours  diltiazem Infusion 5 mG/Hr (5 mL/Hr) IV Continuous <Continuous>  heparin  Infusion. 600 Unit(s)/Hr (6 mL/Hr) IV Continuous <Continuous>  influenza  Vaccine (HIGH DOSE) 0.7 milliLiter(s) IntraMuscular once  latanoprost 0.005% Ophthalmic Solution 1 Drop(s) Both EYES at bedtime  mupirocin 2% Nasal 1 Application(s) Both Nostrils two times a day  pantoprazole  Injectable 40 milliGRAM(s) IV Push daily  piperacillin/tazobactam IVPB.. 3.375 Gram(s) IV Intermittent every 8 hours    MEDICATIONS  (PRN):  heparin   Injectable 2000 Unit(s) IV Push every 6 hours PRN For aPTT between 40 - 57  heparin   Injectable 4000 Unit(s) IV Push every 6 hours PRN For aPTT less than 40      Allergies    No Known Allergies    Intolerances        REVIEW OF SYSTEMS:  limited d/t patient lethargy, nonverbal    Objective:  Vital Signs Last 24 Hrs  T(C): 36.4 (27 Sep 2023 12:09), Max: 36.9 (26 Sep 2023 20:38)  T(F): 97.6 (27 Sep 2023 12:09), Max: 98.4 (26 Sep 2023 20:38)  HR: 93 (27 Sep 2023 14:00) (81 - 109)  BP: 157/97 (27 Sep 2023 14:00) (121/73 - 167/81)  BP(mean): 121 (27 Sep 2023 14:00) (92 - 121)  RR: 12 (27 Sep 2023 14:00) (7 - 21)  SpO2: 100% (27 Sep 2023 14:00) (100% - 100%)    Parameters below as of 27 Sep 2023 09:00  Patient On (Oxygen Delivery Method): nasal cannula  O2 Flow (L/min): 3      GENERAL: ill-appearing, cachectic  HEAD:  Atraumatic, Normocephalic  EYES: PERRL, conjunctiva and sclera clear  ENT: Moist mucous membranes  NECK: Supple, No cervical LAD  CHEST/LUNG: Limited ascultation d/t patient ability to follow instructions for deep inspiration; otherwise CTA B/L  HEART: Regular rate and rhythm; No murmurs, rubs, or gallops  ABDOMEN: Bowel sounds present; Soft, Nontender, Nondistended. No hepatomegaly  EXTREMITIES:  2+ DP Pulses, brisk capillary refill. No clubbing, cyanosis, or edema  NERVOUS SYSTEM:  Unable to assess aao status. PT nonverbal. Arousable to pain, occasionally to voice.  MSK: Upper extremities contracted in flexion with increased tone. Lower extremities normal tone. Diffuse atrophy.  SKIN: Diffuse seborrheic keratoses diffusely on lower extremities.    LABS:                        9.4    6.34  )-----------( 174      ( 27 Sep 2023 05:25 )             27.9     CBC Full  -  ( 27 Sep 2023 05:25 )  WBC Count : 6.34 K/uL  Hemoglobin : 9.4 g/dL  Hematocrit : 27.9 %  Platelet Count - Automated : 174 K/uL  Mean Cell Volume : 77.5 fl  Mean Cell Hemoglobin : 26.1 pg  Mean Cell Hemoglobin Concentration : 33.7 gm/dL  Auto Neutrophil # : 4.78 K/uL  Auto Lymphocyte # : 0.88 K/uL  Auto Monocyte # : 0.57 K/uL  Auto Eosinophil # : 0.04 K/uL  Auto Basophil # : 0.05 K/uL  Auto Neutrophil % : 75.4 %  Auto Lymphocyte % : 13.9 %  Auto Monocyte % : 9.0 %  Auto Eosinophil % : 0.6 %  Auto Basophil % : 0.8 %    27 Sep 2023 05:25    141    |  102    |  20     ----------------------------<  109    3.2     |  29     |  1.30     Ca    8.4        27 Sep 2023 05:25  Phos  3.2       27 Sep 2023 05:25  Mg     2.2       27 Sep 2023 05:25      PT/INR - ( 27 Sep 2023 05:25 )   PT: 12.5 sec;   INR: 1.07 ratio         PTT - ( 27 Sep 2023 13:50 )  PTT:66.6 sec  Urinalysis Basic - ( 27 Sep 2023 05:25 )    Color: x / Appearance: x / SG: x / pH: x  Gluc: 109 mg/dL / Ketone: x  / Bili: x / Urobili: x   Blood: x / Protein: x / Nitrite: x   Leuk Esterase: x / RBC: x / WBC x   Sq Epi: x / Non Sq Epi: x / Bacteria: x      CAPILLARY BLOOD GLUCOSE      POCT Blood Glucose.: 111 mg/dL (27 Sep 2023 11:37)  POCT Blood Glucose.: 117 mg/dL (27 Sep 2023 07:13)  POCT Blood Glucose.: 131 mg/dL (27 Sep 2023 00:05)  POCT Blood Glucose.: 133 mg/dL (26 Sep 2023 17:14)        Culture - Blood (collected 09-25-23 @ 16:43)  Source: .Blood Blood-Peripheral  Preliminary Report (09-26-23 @ 22:01):    No growth at 24 hours    Culture - Urine (collected 09-25-23 @ 16:05)  Source: Clean Catch Clean Catch (Midstream)  Final Report (09-26-23 @ 22:51):    No growth    Culture - Blood (collected 09-25-23 @ 14:20)  Source: .Blood Blood-Peripheral  Preliminary Report (09-26-23 @ 22:01):    No growth at 24 hours        RADIOLOGY & ADDITIONAL TESTS:    Personally reviewed.     Consultant(s) Notes Reviewed:  [x] YES  [ ] NO     Patient is a 73y old  Male who presents with a chief complaint of vomiting and diarrhea.       Subjective:  INTERVAL HPI/OVERNIGHT EVENTS: Patient seen and examined at bedside. No acute overnight events occurred. Cannot obtain reliable ROS due to patient being somnolent, not answering questions.     MEDICATIONS  (STANDING):  apixaban 5 milliGRAM(s) Oral every 12 hours  chlorhexidine 2% Cloths 1 Application(s) Topical <User Schedule>  diltiazem    Tablet 60 milliGRAM(s) Oral every 8 hours  diltiazem Infusion 5 mG/Hr (5 mL/Hr) IV Continuous <Continuous>  heparin  Infusion. 600 Unit(s)/Hr (6 mL/Hr) IV Continuous <Continuous>  influenza  Vaccine (HIGH DOSE) 0.7 milliLiter(s) IntraMuscular once  latanoprost 0.005% Ophthalmic Solution 1 Drop(s) Both EYES at bedtime  mupirocin 2% Nasal 1 Application(s) Both Nostrils two times a day  pantoprazole  Injectable 40 milliGRAM(s) IV Push daily  piperacillin/tazobactam IVPB.. 3.375 Gram(s) IV Intermittent every 8 hours    MEDICATIONS  (PRN):  heparin   Injectable 2000 Unit(s) IV Push every 6 hours PRN For aPTT between 40 - 57  heparin   Injectable 4000 Unit(s) IV Push every 6 hours PRN For aPTT less than 40      Allergies    No Known Allergies    Intolerances        REVIEW OF SYSTEMS:  cannot obtain d/t patient's somnolence, not answering questions    Objective:  Vital Signs Last 24 Hrs  T(C): 36.4 (27 Sep 2023 12:09), Max: 36.9 (26 Sep 2023 20:38)  T(F): 97.6 (27 Sep 2023 12:09), Max: 98.4 (26 Sep 2023 20:38)  HR: 93 (27 Sep 2023 14:00) (81 - 109)  BP: 157/97 (27 Sep 2023 14:00) (121/73 - 167/81)  BP(mean): 121 (27 Sep 2023 14:00) (92 - 121)  RR: 12 (27 Sep 2023 14:00) (7 - 21)  SpO2: 100% (27 Sep 2023 14:00) (100% - 100%)    Parameters below as of 27 Sep 2023 09:00  Patient On (Oxygen Delivery Method): nasal cannula  O2 Flow (L/min): 3      GENERAL: ill-appearing, cachectic, somnolent  HEENT: dry mucous membranes, anicteric  NECK: Supple  CHEST/LUNG: Limited ascultation d/t patient ability to follow instructions for deep inspiration; decreased breath sounds in lower lung fields, no rales, rhonchi or wheezes auscultated   HEART: Regular rate and rhythm; S1, S2  ABDOMEN: Bowel sounds present; Soft, no apparent tenderness, nondistended.   EXTREMITIES:  no cyanosis, or edema; +diffuse muscle atrophy, upper extremities contracted in flexion with increased tone. Diffuse seborrheic keratoses diffusely on lower extremities.  NERVOUS SYSTEM:  Somnolent, rousable to touch, voice, but not answering questions or following commands appropriately     LABS:                        9.4    6.34  )-----------( 174      ( 27 Sep 2023 05:25 )             27.9     CBC Full  -  ( 27 Sep 2023 05:25 )  WBC Count : 6.34 K/uL  Hemoglobin : 9.4 g/dL  Hematocrit : 27.9 %  Platelet Count - Automated : 174 K/uL  Mean Cell Volume : 77.5 fl  Mean Cell Hemoglobin : 26.1 pg  Mean Cell Hemoglobin Concentration : 33.7 gm/dL  Auto Neutrophil # : 4.78 K/uL  Auto Lymphocyte # : 0.88 K/uL  Auto Monocyte # : 0.57 K/uL  Auto Eosinophil # : 0.04 K/uL  Auto Basophil # : 0.05 K/uL  Auto Neutrophil % : 75.4 %  Auto Lymphocyte % : 13.9 %  Auto Monocyte % : 9.0 %  Auto Eosinophil % : 0.6 %  Auto Basophil % : 0.8 %    27 Sep 2023 05:25    141    |  102    |  20     ----------------------------<  109    3.2     |  29     |  1.30     Ca    8.4        27 Sep 2023 05:25  Phos  3.2       27 Sep 2023 05:25  Mg     2.2       27 Sep 2023 05:25      PT/INR - ( 27 Sep 2023 05:25 )   PT: 12.5 sec;   INR: 1.07 ratio         PTT - ( 27 Sep 2023 13:50 )  PTT:66.6 sec  Urinalysis Basic - ( 27 Sep 2023 05:25 )    Color: x / Appearance: x / SG: x / pH: x  Gluc: 109 mg/dL / Ketone: x  / Bili: x / Urobili: x   Blood: x / Protein: x / Nitrite: x   Leuk Esterase: x / RBC: x / WBC x   Sq Epi: x / Non Sq Epi: x / Bacteria: x      CAPILLARY BLOOD GLUCOSE      POCT Blood Glucose.: 111 mg/dL (27 Sep 2023 11:37)  POCT Blood Glucose.: 117 mg/dL (27 Sep 2023 07:13)  POCT Blood Glucose.: 131 mg/dL (27 Sep 2023 00:05)  POCT Blood Glucose.: 133 mg/dL (26 Sep 2023 17:14)        Culture - Blood (collected 09-25-23 @ 16:43)  Source: .Blood Blood-Peripheral  Preliminary Report (09-26-23 @ 22:01):    No growth at 24 hours    Culture - Urine (collected 09-25-23 @ 16:05)  Source: Clean Catch Clean Catch (Midstream)  Final Report (09-26-23 @ 22:51):    No growth    Culture - Blood (collected 09-25-23 @ 14:20)  Source: .Blood Blood-Peripheral  Preliminary Report (09-26-23 @ 22:01):    No growth at 24 hours        RADIOLOGY & ADDITIONAL TESTS:    Personally reviewed.     Consultant(s) Notes Reviewed:  [x] YES  [ ] NO

## 2023-09-27 NOTE — SWALLOW BEDSIDE ASSESSMENT ADULT - ASR SWALLOW RECOMMEND DIAG
2. Consider MBS at MD's discretion to rule out silent aspiration if concerned that results of chest imaging may be dysphagia related

## 2023-09-27 NOTE — PROGRESS NOTE ADULT - ASSESSMENT
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 Admitted to ICU for severe sepsis likely 2/2 rectosigmoid colitis, resulting in AFib with RVR and anion gap metabolic acidosis.     Problem List:  1. Afib w/ RVR  2. Lactic acidosis  3. Sepsis  4. LATOYA on CKD vs CKD  5. DM2  6. HTN  7. TBI Hx  8. New humeral lucencies    Neuro:  -baseline is AOx1  today pt is lethargic, only responds to pain, likely metabolic encephalopathy vs hypoactive delirium  -Hx of TBI s/p SDH w/ EVAC 9/2021 and traumatic SDH 10/2022  head CT results: no new or worsening hemorrhage  -Holding home modafinil in setting of mental status changes    CV:  -Afib w/ RVR likely 2/2 septic shock, currently controlled  -cont cardizem gtt, plan to wean and transition to PO  -Heparin gtt for AC  -Lactate 14 on admission, downtrending  -Goal MAP >65  -Hx HTN, holding home hydralazine  -Cardio Dr. Sepulveda following, normal TTE 8/15/23 and currently no need for repeat    Pulm:  -goal SpO2 >92%   currently on 3L NC  -CXR 9/25: neg acute CP process, +humeral lucencies   CT chest: bibasilar air space opacities, suspicious for infection  Possible aspiration pneumonia, but pt clinically stable      Renal:   -anion gap metabolic acidosis 2/2 lactic acidosis w/ appropriate resp compensation  -Cr baseline ~1.4, CKD   -Cr currently at baseline  -Replete lytes prn; Mg >2, Phos >3  - given K rider x3  -Condom cath, monitor UOP  -D/C bicarb gtt  -start LR at 75cc/hr    GI:  -Diet: Start bite size solid w/ thin liquids per speech, DASH when able  -Presented w/ n/v/d to ED  -Pt had no BM since admission  -CTAP 9/25 w/ rectosigmoid colitis  -Colitis possibly secondary to infectious cause vs ischemic cause vs inflammatory cause  -GI PCR negative, FU C Diff toxin  -follow up on surgical consult    ID:  -Severe sepsis likely from rectosigmoid colitis vs questionable aspiration pneumonia  -pt 95.6F on night of 9/25/23   -leukocytosis to 28k w/ HR 170s, s/p 3.2L NS in ED  -leukocytosis improving, lactose downtrending 10 -> 14 -> 6 -> 5.4  -FU BCx  -Empiric Zoysn (9/25- )  -MRSA PCR positive, start mupirocin ointment nares   -Monitor WBC, temp; currently afebrile    Heme:   -Monitor h/h, appears pt baseline hemoglobin is ~10  -dc heparin gtt   - start eliquis for AC for A-fib      Endo:  -Hx DM2, holding home metformin i/s/o lactic acidosis  -FSG q6    MSK:  -CXR read w/ humeral lucencies  -FU XR BL humerus, consider bone scan/MR   74 y/o M w/ pmh of 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 Admitted to ICU for severe sepsis likely 2/2 rectosigmoid colitis, resulting in AFib with RVR and anion gap metabolic acidosis.     Problem List:  1. Afib w/ RVR  2. Lactic acidosis  3. Sepsis  4. LATOYA on CKD vs CKD  5. DM2  6. HTN  7. TBI Hx  8. New humeral lucencies    Neuro:  -baseline is AOx1  -today pt is lethargic, responds to voice, likely metabolic encephalopathy vs hypoactive delirium  -Hx of TBI s/p SDH w/ EVAC 9/2021 and traumatic SDH 10/2022  head CT results: no new or worsening hemorrhage  -Holding home modafinil in setting of mental status changes    CV:  -Afib w/ RVR likely 2/2 septic shock, currently controlled  -cont cardizem gtt, plan to wean and transition to PO  -Heparin gtt for AC  -Lactate 14 on admission, downtrending  -Goal MAP >65  -Hx HTN, holding home hydralazine  -Cardio Dr. Sepulveda following, normal TTE 8/15/23 and currently no need for repeat    Pulm:  -goal SpO2 >92%   currently on 3L NC  -CXR 9/25: neg acute CP process, +humeral lucencies   CT chest: bibasilar air space opacities, suspicious for infection  Possible aspiration pneumonia, but pt clinically stable      Renal:   -anion gap metabolic acidosis 2/2 lactic acidosis w/ appropriate resp compensation  -Cr baseline ~1.4, CKD   -Cr currently at baseline  -Replete lytes prn; Mg >2, Phos >3  - given K rider x3  -Condom cath, monitor UOP  -D/C bicarb gtt  -start LR at 75cc/hr    GI:  -Diet: Start bite size solid w/ thin liquids per speech, DASH when able  -Presented w/ n/v/d to ED  -Pt had no BM since admission  -CTAP 9/25 w/ rectosigmoid colitis  -Colitis possibly secondary to infectious cause vs ischemic cause vs inflammatory cause  -GI PCR negative, FU C Diff toxin  -follow up on surgical consult    ID:  -Severe sepsis likely from rectosigmoid colitis vs questionable aspiration pneumonia  -pt 95.6F on night of 9/25/23   -leukocytosis to 28k w/ HR 170s, s/p 3.2L NS in ED  -leukocytosis improving, lactose downtrending 10 -> 14 -> 6 -> 5.4  -FU BCx  -Empiric Zoysn (9/25- )  -MRSA PCR positive, start mupirocin ointment nares   -Monitor WBC, temp; currently afebrile    Heme:   -Monitor h/h, appears pt baseline hemoglobin is ~10  -dc heparin gtt   - start eliquis for AC for A-fib      Endo:  -Hx DM2, holding home metformin i/s/o lactic acidosis  -FSG q6    MSK:  -CXR read w/ humeral lucencies  -FU XR BL humerus, consider bone scan/MR   74 y/o M w/ pmh of 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 Admitted to ICU for severe sepsis likely 2/2 rectosigmoid colitis, resulting in AFib with RVR and anion gap metabolic acidosis.     Problem List:  1. Afib w/ RVR  2. Lactic acidosis  3. Sepsis  4. LATOYA on CKD vs CKD  5. DM2  6. HTN  7. TBI Hx  8. New humeral lucencies    Neuro:  -baseline is AOx1  -today pt is lethargic, responds to voice, likely metabolic encephalopathy vs hypoactive delirium  -Hx of TBI s/p SDH w/ EVAC 9/2021 and traumatic SDH 10/2022  head CT results: no new or worsening hemorrhage  -Holding home modafinil in setting of mental status changes    CV:  -Afib w/ RVR likely 2/2 septic shock, currently controlled  -transition to PO for Cardizem: 60mg q8h  -transition to Eliquis: 5mg, q12h   -Lactate 14 on admission, downtrending, currently 5.4. Recheck on 9/28/2023.  -Goal MAP >65  -Hx HTN, holding home hydralazine  -Cardio Dr. Sepulveda following, normal TTE 8/15/23 and currently no need for repeat    Pulm:  -SpO2 >100%   -currently on 3L NC  -CXR 9/25: neg acute CP process, +humeral lucencies   -CT chest: bibasilar air space opacities, suspicious for infection  -Possible aspiration pneumonia, but pt clinically stable      Renal:   -high anion gap metabolic acidosis 2/2 lactic acidosis w/ appropriate resp compensation, anion gap has resolved.  -Cr baseline ~1.4, CKD   -Cr currently at 1.3  -eGFR: 59, Stage III KD  -Mg and phos w/in normal limits  - given K rider x3 for low K+ (3.2), bicarb DC'd.  -Condom cath, monitor UOP  -LR at 75cc/hr    GI:  -dysphagia evaluation: pt tolerates bite size solids w/ thin liquids  -diet: start bite size solid w/ thin liquids per speech, DASH   -switch to PO PPI  -try diet for GI motility followed by miralax/senna  -Presented w/ n/v/d to ED  -Pt had no BM since admission  -C Diff lab DC'd   -CTAP 9/25 w/ rectosigmoid colitis  -Colitis possibly secondary to infectious cause vs ischemic cause vs inflammatory cause, surgery consult revealed ischemic cause to be unlikely        ID:  -Severe sepsis likely from rectosigmoid colitis vs questionable aspiration pneumonia  -WBC dropped precipitously to 6.34K, similar occurrence on prior admissions   -lactose downtrending 10 -> 14 -> 6 -> 5.4  -BCx and UCx returned no growth over past 24hr  -Empiric Zoysn   -MRSA PCR positive, on mupirocin ointment nares   -C Diff lab + precaution DC'd  -Monitor WBC, temp; currently afebrile    Heme:   -Monitor h/h, appears pt baseline hemoglobin is ~10, hgb = 9.4  -dc heparin gtt, transition to Eliquis: 5mg, q12h         Endo:  -Hx DM2, consider stopping metformin after following up w/ primary care  -FSG q6    MSK:  -CXR read w/ humeral lucencies  -Humeral x-ray revealed cortical humeral lucencies    72 y/o M w/ pmh of 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 Admitted to ICU for severe sepsis likely 2/2 rectosigmoid colitis, resulting in AFib with RVR and anion gap metabolic acidosis.     Problem List:  1. Afib w/ RVR  2. Lactic acidosis  3. Sepsis  4. LATOYA on CKD vs CKD  5. DM2  6. HTN  7. TBI Hx  8. New humeral lucencies    Neuro:  -baseline is AOx1  -today pt is awake and alert, responds to voice  - previously lethargic, likely 2/2 metabolic encephalopathy vs hypoactive delirium  -Hx of TBI s/p SDH w/ EVAC 9/2021 and traumatic SDH 10/2022  - head CT results: no new or worsening hemorrhage  -Holding home modafinil in setting of mental status changes    CV:  -Afib w/ RVR likely 2/2 septic shock, currently rate controlled  -transition to PO for Cardizem: 60mg q8h PO, dc cardizem gtt  -transition to Eliquis: 5mg, q12h, dc heparin gtt  -Lactate 14 on admission, downtrending, currently 5.4. Recheck on 9/28/2023.  -Goal MAP >65  -Hx HTN, holding home hydralazine  -Cardio Dr. Sepulveda following, TTE (8/15/23) with LVEF 60-65%, LA mildly dilated, trace MR and trace TR. no need for repeat    Pulm:  -SpO2 =100%   -currently on 3L NC  -CXR 9/25: neg acute CP process, +humeral lucencies   -CT chest: bibasilar air space opacities, suspicious for infection  -Possible aspiration pneumonia, but pt clinically stable    Renal:   -high anion gap metabolic acidosis 2/2 lactic acidosis w/ appropriate resp compensation, improving, anion gap has resolved.  -Cr baseline ~1.4, CKD stage 3a  -Cr currently at 1.3  -Mg and phos w/in normal limits  - given K rider x3 for low K+ (3.2)  - bicarb gtt DC'd.  -Condom cath, monitor UOP  - dc LR at 75cc/hr    GI:  - Speech & Swallow evaluation: recommends bite size solids w/ thin liquids  -diet: start bite size solid w/ thin liquids per speech, DASH   -switch to PO PPI  -try diet for GI motility followed by miralax/senna  -Presented w/ n/v/d to ED  -Pt had no BM since admission  -C Diff lab DC'd   -CTAP 9/25 w/ rectosigmoid colitis  -Colitis possibly secondary to infectious cause vs ischemic cause vs inflammatory cause, surgery consult revealed ischemic cause to be unlikely        ID:  -Severe sepsis likely from rectosigmoid colitis vs questionable aspiration pneumonia  -WBC dropped precipitously to 6.34K, similar occurrence on prior admissions   -lactose downtrending 10 -> 14 -> 6 -> 5.4  -BCx and UCx returned no growth over past 24hr  -Empiric Zoysn   -MRSA PCR positive, on mupirocin ointment nares   -C Diff lab + precaution DC'd  -Monitor WBC, temp; currently afebrile    Heme:   -Monitor h/h, appears pt baseline hemoglobin is ~10, hgb = 9.4  -dc heparin gtt, transition to Eliquis: 5mg, q12h         Endo:  -Hx DM2, consider stopping metformin after following up w/ primary care  -FSG q6    MSK:  -CXR read w/ humeral lucencies  -Humeral x-ray revealed cortical humeral lucencies    72 y/o M w/ pmh of 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 Admitted to ICU for severe sepsis likely 2/2 rectosigmoid colitis, resulting in AFib with RVR and anion gap metabolic acidosis.     Problem List:  1. Afib w/ RVR  2. Lactic acidosis  3. Sepsis  4. LATOYA on CKD vs CKD  5. DM2  6. HTN  7. TBI Hx  8. New humeral lucencies    Neuro:  -baseline is AOx1  -today pt is awake and alert, responds to voice  - previously lethargic, likely 2/2 metabolic encephalopathy vs hypoactive delirium  -Hx of TBI s/p SDH w/ EVAC 9/2021 and traumatic SDH 10/2022  - head CT results: no new or worsening hemorrhage  -Holding home modafinil in setting of mental status changes    CV:  -Afib w/ RVR likely 2/2 septic shock, currently rate controlled  -transition to PO for Cardizem: 60mg q8h PO, dc cardizem gtt  -transition to Eliquis: 5mg, q12h, dc heparin gtt  -Lactate 14 on admission, downtrending, currently 5.4. Recheck on 9/28/2023.  -Goal MAP >65  -Hx HTN, holding home hydralazine  -Cardio Dr. Sepulveda following, TTE (8/15/23) with LVEF 60-65%, LA mildly dilated, trace MR and trace TR. no need for repeat    Pulm:  -SpO2 =100%   -currently on 3L NC  -CXR 9/25: neg acute CP process, +humeral lucencies   -CT chest: bibasilar air space opacities, suspicious for infection  -Possible aspiration pneumonia, but pt clinically stable    Renal:   -high anion gap metabolic acidosis 2/2 lactic acidosis w/ appropriate resp compensation, improving, anion gap has resolved.  -Cr baseline ~1.4, CKD stage 3a  -Cr currently at 1.3  -Mg and phos w/in normal limits  - given K rider x3 for low K+ (3.2)  - bicarb gtt DC'd.  -Condom cath, monitor UOP  - dc LR at 75cc/hr    GI:  - Speech & Swallow evaluation: recommends bite size solids w/ thin liquids  -diet: start bite size solid w/ thin liquids per speech, DASH   - consider switch to PO PPI  -Pt had no BM since admission  -C Diff lab DC'd   -CTAP 9/25 w/ rectosigmoid colitis  -Colitis possibly secondary to infectious cause vs ischemic cause vs inflammatory cause, surgery consult revealed ischemic cause to be unlikely  - Surgery consulted, no acute actions  - on discharge, recommend bowel regimen with miralax and/or senna      ID:  -Severe sepsis likely from rectosigmoid colitis vs questionable aspiration pneumonia  -WBC dropped precipitously to 6.34K, similar occurrence on prior admissions   -lactose downtrending 10 -> 14 -> 6 -> 5.4  -BCx and UCx returned no growth over past 24hr  -Empiric Zoysn   -MRSA PCR positive, on mupirocin ointment nares   -C Diff lab + isolation DC'd  -Monitor WBC, temp; currently afebrile    Heme:   -Monitor h/h, appears pt baseline hemoglobin is ~10, hgb = 9.4  -dc heparin gtt, transition to Eliquis: 5mg, q12h     Endo:  -Hx DM2, recommend stopping metformin after following up w/ primary care  -FSG q6    MSK:  -CXR read w/ humeral lucencies  -Humeral x-ray revealed bilateral cortical humeral lucencies, possibly multiple myeloma, radiologist recommends concurrent CT 74 y/o M w/ pmh of 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 Admitted to ICU for severe sepsis likely 2/2 rectosigmoid colitis, resulting in AFib with RVR and anion gap metabolic acidosis. Pt currently stable, Afib is rate controlled and resolved anion gap metabolic acidosis.     Problem List:  1. Afib w/ RVR  2. Lactic acidosis  3. Sepsis  4. LATOYA on CKD  5. DM2  6. HTN  7. New humeral lucencies    Neuro:  -metabolic encephalopathy improving, today pt is awake and alert, responds to voice  -Holding home remeron and modafinil in setting of mental status changes    CV:  -Afib w/ RVR likely 2/2 severe sepsis, currently rate controlled  -transition to PO for Cardizem: 60mg q8h PO, dc cardizem gtt  -transition to Eliquis: 5mg, q12h, dc heparin gtt  -Lactate 14 on admission, downtrending. Recheck on 9/28/2023.  -Hx HTN, holding home hydralazine    Pulm:  -currently on 3L NC and stable  -CT consistent with aspiration pneumonia, but clinically doubtful    Renal:   -high anion gap metabolic acidosis 2/2 lactic acidosis w/ appropriate resp compensation, improving, anion gap has resolved.  -CKD3 at baseline  - given K rider x3 for low K+ (3.2)  - bicarb gtt DC'd.  -Condom cath, monitor UOP  - dc LR at 75cc/hr    GI:  - Speech & Swallow evaluation: recommends bite size solids w/ thin liquids  - consider switch to PO PPI  -Colitis possibly secondary to infectious cause vs ischemic cause vs inflammatory cause, surgery consult revealed ischemic cause to be unlikely  - Surgery consulted, no acute actions  - -Pt had no BM since admission, on discharge, recommend bowel regimen with miralax and/or senna      ID:  -Severe sepsis likely from rectosigmoid colitis vs questionable aspiration pneumonia  -WBC dropped precipitously to 6.34K, similar occurrence on prior admissions   -BCx and UCx returned no growth over past 24hr  - continue Empiric Zoysn for colits  -MRSA PCR positive, on mupirocin ointment nares       Heme:   -Monitor h/h, appears pt baseline hemoglobin is ~10, hgb = 9.4  -dc heparin gtt, transition to Eliquis: 5mg, q12h     Endo:  -Hx DM2, recommend stopping metformin on discharge due to recurrent lactic acidosis, ?contribution    MSK:  -CXR read w/ humeral lucencies  -Humeral x-ray revealed bilateral cortical humeral lucencies, possibly multiple myeloma, check SPEP, UPEP, light chains

## 2023-09-27 NOTE — DIETITIAN INITIAL EVALUATION ADULT - REASON FOR ADMISSION
Atrial fibrillation    72 y/o M with PMHx DM2 on metformin with peripheral neuropathy, HTN, HLD A-fib (on Eliquis), TBI (s/p SDH with EVAC on 9/2021), and traumatic subdural hemorrhage in 10/2022 Covid Pna, and major depression presented to the ED with c/o vomiting and diarrhea started today. CT chest  A/P shows: Bibasilar airspace opacities suspicious for infection.  Rectosigmoid colitis. Admitted for Afib with RVR, Lactemia (GORDON/SHERLYN?) and Sepsis 2/2  Rectosigmoid colitis in  ICU

## 2023-09-27 NOTE — DIETITIAN INITIAL EVALUATION ADULT - PROBLEM SELECTOR PLAN 1
-  Patient on admission with a fib wit RVR  at rates 120-130's , at times ranges >170's in the setting of sepsis  -  EKG: A fib with RVR 128bpm QTc 458 ms    - s/p Cardizem 10 mg IV X1 Zofran 4mg IV X1 zosyn x1 vanco x1  3200cc NS and Cardizem infusion 225ML/HR  in ER  - Continue Cardizem infusion, can tritiate as needed  - No evidence of volume overload  - Can hold Eliquis for now as patient is very lethargic and unable to take PO.  Would start FD Lovenox per cardio   - Had a normal TTE in 8/15/23.  No need to repeat  - Started on heparin gtt   - Cardio following Dr Sepulveda, following  - Care per ICU

## 2023-09-27 NOTE — PROGRESS NOTE ADULT - SUBJECTIVE AND OBJECTIVE BOX
INTERVAL HPI/OVERNIGHT EVENTS:    SUBJECTIVE: Patient seen and examined at bedside.     ROS:  CV: Denies chest pain  Resp: Denies SOB  GI: Denies abdominal pain, constipation, diarrhea, nausea, vomiting  : Denies dysuria  ID: Denies fevers, chills  MSK: Denies joint pain     OBJECTIVE:    VITAL SIGNS:  ICU Vital Signs Last 24 Hrs  T(C): 36.4 (27 Sep 2023 12:09), Max: 36.9 (26 Sep 2023 20:38)  T(F): 97.6 (27 Sep 2023 12:09), Max: 98.4 (26 Sep 2023 20:38)  HR: 109 (27 Sep 2023 12:00) (81 - 127)  BP: 167/81 (27 Sep 2023 12:00) (121/73 - 167/81)  BP(mean): 113 (27 Sep 2023 12:00) (92 - 113)  ABP: --  ABP(mean): --  RR: 7 (27 Sep 2023 12:00) (7 - 21)  SpO2: 100% (27 Sep 2023 12:00) (100% - 100%)    O2 Parameters below as of 27 Sep 2023 09:00  Patient On (Oxygen Delivery Method): nasal cannula  O2 Flow (L/min): 3            09-26 @ 07:01 - 09-27 @ 07:00  --------------------------------------------------------  IN: 2061 mL / OUT: 1700 mL / NET: 361 mL    09-27 @ 07:01 - 09-27 @ 13:07  --------------------------------------------------------  IN: 355 mL / OUT: 0 mL / NET: 355 mL      CAPILLARY BLOOD GLUCOSE      POCT Blood Glucose.: 111 mg/dL (27 Sep 2023 11:37)      PHYSICAL EXAM:  General: NAD, comfortable  HEENT: NCAT, clear conjunctiva, no scleral icterus  Respiratory: CTA b/l, no wheezing, rhonchi, rales  Cardiovascular: RRR, normal S1S2, no M/R/G  Abdomen: soft, NT/ND, bowel sounds present  Extremities: no LE edema or calf tenderness  Neurology: awake and alert    MEDICATIONS:  MEDICATIONS  (STANDING):  chlorhexidine 2% Cloths 1 Application(s) Topical <User Schedule>  diltiazem Infusion 5 mG/Hr (5 mL/Hr) IV Continuous <Continuous>  heparin  Infusion.  Unit(s)/Hr (10 mL/Hr) IV Continuous <Continuous>  influenza  Vaccine (HIGH DOSE) 0.7 milliLiter(s) IntraMuscular once  latanoprost 0.005% Ophthalmic Solution 1 Drop(s) Both EYES at bedtime  mupirocin 2% Nasal 1 Application(s) Both Nostrils two times a day  pantoprazole  Injectable 40 milliGRAM(s) IV Push daily  piperacillin/tazobactam IVPB.. 3.375 Gram(s) IV Intermittent every 8 hours    MEDICATIONS  (PRN):  heparin   Injectable 4000 Unit(s) IV Push every 6 hours PRN For aPTT less than 40  heparin   Injectable 2000 Unit(s) IV Push every 6 hours PRN For aPTT between 40 - 57      ALLERGIES:  Allergies    No Known Allergies    Intolerances        LABS:                        9.4    6.34  )-----------( 174      ( 27 Sep 2023 05:25 )             27.9     09-27    141  |  102  |  20  ----------------------------<  109<H>  3.2<L>   |  29  |  1.30    Ca    8.4<L>      27 Sep 2023 05:25  Phos  3.2     09-27  Mg     2.2     09-27    TPro  5.5<L>  /  Alb  2.7<L>  /  TBili  0.5  /  DBili  x   /  AST  12<L>  /  ALT  17  /  AlkPhos  109  09-25    PT/INR - ( 27 Sep 2023 05:25 )   PT: 12.5 sec;   INR: 1.07 ratio         PTT - ( 27 Sep 2023 05:25 )  PTT:129.0 sec  Urinalysis Basic - ( 27 Sep 2023 05:25 )    Color: x / Appearance: x / SG: x / pH: x  Gluc: 109 mg/dL / Ketone: x  / Bili: x / Urobili: x   Blood: x / Protein: x / Nitrite: x   Leuk Esterase: x / RBC: x / WBC x   Sq Epi: x / Non Sq Epi: x / Bacteria: x        RADIOLOGY & ADDITIONAL TESTS: Reviewed. ***  BEDSIDE LUNG ULTRASOUND: ***  BEDSIDE ECHO: ***    CENTRAL LINE: N        DATE INSERTED:             REMOVE: N  OAKLEY: Y                       DATE INSERTED:              REMOVE: Y/N  A-LINE: N                       DATE INSERTED:              REMOVE: Y/N      GLOBAL ISSUE/BEST PRACTICE  Analgesia: Y  Sedation: Y  HOB elevation: yes  Stress ulcer prophylaxis: Y  VTE prophylaxis: Y  Glycemic control: Y  Nutrition: Y    CODE STATUS: Full Code INTERVAL HPI/OVERNIGHT EVENTS: uneventful, pt afebrile    SUBJECTIVE: Patient seen and examined at bedside. Pt is comfortable, responds to voice.     ROS:  Unable to obtain ROS secondary to mental staus    OBJECTIVE:    VITAL SIGNS:  ICU Vital Signs Last 24 Hrs  T(C): 36.4 (27 Sep 2023 12:09), Max: 36.9 (26 Sep 2023 20:38)  T(F): 97.6 (27 Sep 2023 12:09), Max: 98.4 (26 Sep 2023 20:38)  HR: 109 (27 Sep 2023 12:00) (81 - 127)  BP: 167/81 (27 Sep 2023 12:00) (121/73 - 167/81)  BP(mean): 113 (27 Sep 2023 12:00) (92 - 113)  ABP: --  ABP(mean): --  RR: 7 (27 Sep 2023 12:00) (7 - 21)  SpO2: 100% (27 Sep 2023 12:00) (100% - 100%)    O2 Parameters below as of 27 Sep 2023 09:00  Patient On (Oxygen Delivery Method): nasal cannula  O2 Flow (L/min): 3            09-26 @ 07:01  -  09-27 @ 07:00  --------------------------------------------------------  IN: 2061 mL / OUT: 1700 mL / NET: 361 mL    09-27 @ 07:01 - 09-27 @ 13:07  --------------------------------------------------------  IN: 355 mL / OUT: 0 mL / NET: 355 mL      CAPILLARY BLOOD GLUCOSE      POCT Blood Glucose.: 111 mg/dL (27 Sep 2023 11:37)      PHYSICAL EXAM:  General: chronically ill, bedbound. Pt lethargic, responds to voice. On 3L NC.  HEENT: NCAT, PERRLA  Respiratory: CTA b/l, no wheezing, rhonchi, rales  Cardiovascular: RRR, normal S1S2, no M/R/G  Abdomen: soft, ND, tender to deep palpation to LLQ and RLQ, more so on LLQ, condom catheter in place.   Extremities: +1 pitting edema on the dorsum of feet. No calf tenderness, arm contracted.  Neurology: pt awake, responds to voice.     MEDICATIONS:  MEDICATIONS  (STANDING):  chlorhexidine 2% Cloths 1 Application(s) Topical <User Schedule>  diltiazem Infusion 5 mG/Hr (5 mL/Hr) IV Continuous <Continuous>  heparin  Infusion.  Unit(s)/Hr (10 mL/Hr) IV Continuous <Continuous>  influenza  Vaccine (HIGH DOSE) 0.7 milliLiter(s) IntraMuscular once  latanoprost 0.005% Ophthalmic Solution 1 Drop(s) Both EYES at bedtime  mupirocin 2% Nasal 1 Application(s) Both Nostrils two times a day  pantoprazole  Injectable 40 milliGRAM(s) IV Push daily  piperacillin/tazobactam IVPB.. 3.375 Gram(s) IV Intermittent every 8 hours    MEDICATIONS  (PRN):  heparin   Injectable 4000 Unit(s) IV Push every 6 hours PRN For aPTT less than 40  heparin   Injectable 2000 Unit(s) IV Push every 6 hours PRN For aPTT between 40 - 57      ALLERGIES:  Allergies    No Known Allergies    Intolerances        LABS:                        9.4    6.34  )-----------( 174      ( 27 Sep 2023 05:25 )             27.9     09-27    141  |  102  |  20  ----------------------------<  109<H>  3.2<L>   |  29  |  1.30    Ca    8.4<L>      27 Sep 2023 05:25  Phos  3.2     09-27  Mg     2.2     09-27    TPro  5.5<L>  /  Alb  2.7<L>  /  TBili  0.5  /  DBili  x   /  AST  12<L>  /  ALT  17  /  AlkPhos  109  09-25    PT/INR - ( 27 Sep 2023 05:25 )   PT: 12.5 sec;   INR: 1.07 ratio         PTT - ( 27 Sep 2023 05:25 )  PTT:129.0 sec  Urinalysis Basic - ( 27 Sep 2023 05:25 )    Color: x / Appearance: x / SG: x / pH: x  Gluc: 109 mg/dL / Ketone: x  / Bili: x / Urobili: x   Blood: x / Protein: x / Nitrite: x   Leuk Esterase: x / RBC: x / WBC x   Sq Epi: x / Non Sq Epi: x / Bacteria: x        RADIOLOGY & ADDITIONAL TESTS: Reviewed.     ACC: [id number] EXAM:  XR HUMERUS MIN 2 VIEWS BI   ORDERED BY: REBECCA BERNAL     PROCEDURE DATE:  09/26/2023          INTERPRETATION:  Clinical history: 73-year-old male, new lucencies.    Three views of each humerus are correlated with a concurrent chest CT.    FINDINGS: Innumerable tiny cortical lucencies in each humeral shaft,   better characterized on concurrent chest CT. No fracture or dislocation.    IMPRESSION:    Small tiny cortical lucencies in each humeral shaft, better characterized   on concurrent CT. Differential diagnosis includes multiple myeloma    --- End of Report ---          DANIAL SAMS DO; Attending Radiologist  This document has been electronically signed. Sep 26 2023  1:33PM        ACC: [id number] EXAM:  CT ABDOMEN AND PELVIS IC   ORDERED BY: RUSSELL NGUYEN     ACC: [id number] EXAM:  CT CHEST IC   ORDERED BY: RUSSELL NGUYEN     PROCEDURE DATE:  09/25/2023          INTERPRETATION:  CLINICAL INFORMATION: sepsis Admitting Dxs: I48.91   UNSPECIFIED ATRIAL FIBRILLATION PCT    COMPARISON: 8/12/2023.    CONTRAST/COMPLICATIONS:  IV Contrast: Omnipaque 350 (accession [id number]), IV contrast documented   in unlinked concurrent exam (accession [id number])  90 cc administered   10   cc discarded  Oral Contrast: NONE  Complications: None reported at time of study completion    PROCEDURE:  CT of the Chest, Abdomen and Pelvis was performed.  Sagittal and coronal reformats were performed.    FINDINGS:  CHEST:  LUNGS AND LARGE AIRWAYS: Retained secretions in the central airways.   Bibasilar airspace opacities.  PLEURA: Small pleural effusions.  VESSELS: Within normal limits.  HEART: Heart size is normal.  Trace pericardial effusion.  MEDIASTINUM AND ALYSSA: No lymphadenopathy.  CHEST WALL AND LOWER NECK: Within normal limits.    ABDOMEN AND PELVIS:  LIVER: Within normal limits.  BILE DUCTS: Normal caliber.  GALLBLADDER: Within normal limits.  SPLEEN: Multiple hypodense lesions.  PANCREAS: Within normal limits.  ADRENALS: Within normal limits.  KIDNEYS/URETERS: Cysts and other lesions too small to characterize.    BLADDER: Within normal limits.  REPRODUCTIVE ORGANS: The prostate is enlarged.    BOWEL: No bowel obstruction. There is moderate thickening of the   rectosigmoid colon consistent with colitis.  PERITONEUM: No ascites.  VESSELS:  Within normal limits.  RETROPERITONEUM/LYMPH NODES: No lymphadenopathy.  ABDOMINAL WALL: Small inguinal hernias.  BONES: Within normal limits.    IMPRESSION: Bibasilar airspace opacities suspicious for infection.    Rectosigmoid colitis.    --- End of Report ---          SAWYER MELGAR MD; Attending Radiologist  This document has been electronically signed. Sep 25 2023  8:07PM          ACC: [id number] EXAM:  CT BRAIN   ORDERED BY: RUSSELL NGUYEN     PROCEDURE DATE:  09/25/2023          INTERPRETATION:  CLINICAL INDICATION: Sepsis.    TECHNIQUE: CT of the head was performed without the administration of   intravenous contrast.    COMPARISON: CT head 8/13/2023.    FINDINGS:  High right parietal craniotomy is again seen.    Thin hyperdensity along the posterior falx is slightly decreased in   prominence, representing residual subdural hemorrhage. No new or   worsening intracranial hemorrhage identified.    Encephalomalacia/gliosis in the right temporal pole and inferior frontal   lobes, most compatible with chronic traumatic brain injury. Unchanged   white matter hypoattenuation in bilateral frontoparietal white matter.    Moderate hydrocephalus is unchanged.    The visualized intraorbital contents are unremarkable. The imaged   portions of the paranasal sinuses are clear. The mastoid air cells are   clear.    IMPRESSION:    -Decreased prominence of posterior parafalcine subdural hematoma. No new   or worsening hemorrhage identified.    -Stable moderate hydrocephalus.    --- End of Report ---            ANGELINA UREÑA MD; Attending Radiologist  This document has been electronically signed. Sep 25 2023  9:28PM    BEDSIDE LUNG ULTRASOUND: ***  BEDSIDE ECHO: ***    CENTRAL LINE: N        DATE INSERTED:             REMOVE: N  OAK[place]: N                       DATE INSERTED:              REMOVE: Y/N  A-LINE: N                       DATE INSERTED:              REMOVE: Y/N      GLOBAL ISSUE/BEST PRACTICE  Analgesia: N  Sedation: N  HOB elevation: yes  Stress ulcer prophylaxis: Y  VTE prophylaxis: Y  Glycemic control: Y  Nutrition: Y    CODE STATUS: Full Code INTERVAL HPI/OVERNIGHT EVENTS: uneventful, pt afebrile    SUBJECTIVE: Patient seen and examined at bedside. Pt is comfortable, responds to voice, answering simple questions. He states he feels "good". Denies pain, chest pain, abd pain, SOB, nausea    ROS:  HENMT: No HA, dizziness  RESPIRATORY: No shortness of breath.  CARDIOVASCULAR: No chest pain  GASTROINTESTINAL: No abdominal pain. No nausea       OBJECTIVE:    VITAL SIGNS:  ICU Vital Signs Last 24 Hrs  T(C): 36.4 (27 Sep 2023 12:09), Max: 36.9 (26 Sep 2023 20:38)  T(F): 97.6 (27 Sep 2023 12:09), Max: 98.4 (26 Sep 2023 20:38)  HR: 109 (27 Sep 2023 12:00) (81 - 127)  BP: 167/81 (27 Sep 2023 12:00) (121/73 - 167/81)  BP(mean): 113 (27 Sep 2023 12:00) (92 - 113)  ABP: --  ABP(mean): --  RR: 7 (27 Sep 2023 12:00) (7 - 21)  SpO2: 100% (27 Sep 2023 12:00) (100% - 100%)    O2 Parameters below as of 27 Sep 2023 09:00  Patient On (Oxygen Delivery Method): nasal cannula  O2 Flow (L/min): 3            09-26 @ 07:01  -  09-27 @ 07:00  --------------------------------------------------------  IN: 2061 mL / OUT: 1700 mL / NET: 361 mL    09-27 @ 07:01  -  09-27 @ 13:07  --------------------------------------------------------  IN: 355 mL / OUT: 0 mL / NET: 355 mL      CAPILLARY BLOOD GLUCOSE      POCT Blood Glucose.: 111 mg/dL (27 Sep 2023 11:37)      PHYSICAL EXAM:  General: chronically ill, bedbound. Pt awake, alert, responds to voice. On 3L NC.  HEENT: NCAT  Respiratory: CTA b/l, no wheezing, rhonchi, rales  Cardiovascular: irregular rhythm, no M/R/G  Abdomen: soft, ND, tender to deep palpation to LLQ and RLQ, more so on LLQ, condom catheter in place.   Extremities: +1 pitting edema on the dorsum of feet. No calf tenderness, arm contracted.  Neurology: pt awake, responds to voice.     MEDICATIONS:  MEDICATIONS  (STANDING):  chlorhexidine 2% Cloths 1 Application(s) Topical <User Schedule>  diltiazem Infusion 5 mG/Hr (5 mL/Hr) IV Continuous <Continuous>  heparin  Infusion.  Unit(s)/Hr (10 mL/Hr) IV Continuous <Continuous>  influenza  Vaccine (HIGH DOSE) 0.7 milliLiter(s) IntraMuscular once  latanoprost 0.005% Ophthalmic Solution 1 Drop(s) Both EYES at bedtime  mupirocin 2% Nasal 1 Application(s) Both Nostrils two times a day  pantoprazole  Injectable 40 milliGRAM(s) IV Push daily  piperacillin/tazobactam IVPB.. 3.375 Gram(s) IV Intermittent every 8 hours    MEDICATIONS  (PRN):  heparin   Injectable 4000 Unit(s) IV Push every 6 hours PRN For aPTT less than 40  heparin   Injectable 2000 Unit(s) IV Push every 6 hours PRN For aPTT between 40 - 57      ALLERGIES:  Allergies    No Known Allergies    Intolerances        LABS:                        9.4    6.34  )-----------( 174      ( 27 Sep 2023 05:25 )             27.9     09-27    141  |  102  |  20  ----------------------------<  109<H>  3.2<L>   |  29  |  1.30    Ca    8.4<L>      27 Sep 2023 05:25  Phos  3.2     09-27  Mg     2.2     09-27    TPro  5.5<L>  /  Alb  2.7<L>  /  TBili  0.5  /  DBili  x   /  AST  12<L>  /  ALT  17  /  AlkPhos  109  09-25    PT/INR - ( 27 Sep 2023 05:25 )   PT: 12.5 sec;   INR: 1.07 ratio         PTT - ( 27 Sep 2023 05:25 )  PTT:129.0 sec  Urinalysis Basic - ( 27 Sep 2023 05:25 )    Color: x / Appearance: x / SG: x / pH: x  Gluc: 109 mg/dL / Ketone: x  / Bili: x / Urobili: x   Blood: x / Protein: x / Nitrite: x   Leuk Esterase: x / RBC: x / WBC x   Sq Epi: x / Non Sq Epi: x / Bacteria: x        RADIOLOGY & ADDITIONAL TESTS: Reviewed.     ACC: [id number] EXAM:  XR HUMERUS MIN 2 VIEWS BI   ORDERED BY: REBECCA BERNAL     PROCEDURE DATE:  09/26/2023          INTERPRETATION:  Clinical history: 73-year-old male, new lucencies.    Three views of each humerus are correlated with a concurrent chest CT.    FINDINGS: Innumerable tiny cortical lucencies in each humeral shaft,   better characterized on concurrent chest CT. No fracture or dislocation.    IMPRESSION:    Small tiny cortical lucencies in each humeral shaft, better characterized   on concurrent CT. Differential diagnosis includes multiple myeloma    --- End of Report ---          DANIAL SAMS DO; Attending Radiologist  This document has been electronically signed. Sep 26 2023  1:33PM      CENTRAL LINE: N        DATE INSERTED:             REMOVE: N  O[place]: N                       DATE INSERTED:              REMOVE: Y/N  A-LINE: N                       DATE INSERTED:              REMOVE: Y/N      GLOBAL ISSUE/BEST PRACTICE  Analgesia: N  Sedation: N  HOB elevation: yes  Stress ulcer prophylaxis: Y  VTE prophylaxis: Y  Glycemic control: N  Nutrition: Y    CODE STATUS: Full Code INTERVAL HPI/OVERNIGHT EVENTS: no overnight events, pt afebrile    SUBJECTIVE: Patient seen and examined at bedside. Pt is comfortable, responds to voice, answering simple questions. He states he feels "good". Denies pain, chest pain, abd pain, SOB, nausea    ROS:  HENMT: No HA, dizziness  RESPIRATORY: No shortness of breath.  CARDIOVASCULAR: No chest pain  GASTROINTESTINAL: No abdominal pain. No nausea       OBJECTIVE:    VITAL SIGNS:  ICU Vital Signs Last 24 Hrs  T(C): 36.4 (27 Sep 2023 12:09), Max: 36.9 (26 Sep 2023 20:38)  T(F): 97.6 (27 Sep 2023 12:09), Max: 98.4 (26 Sep 2023 20:38)  HR: 109 (27 Sep 2023 12:00) (81 - 127)  BP: 167/81 (27 Sep 2023 12:00) (121/73 - 167/81)  BP(mean): 113 (27 Sep 2023 12:00) (92 - 113)  ABP: --  ABP(mean): --  RR: 7 (27 Sep 2023 12:00) (7 - 21)  SpO2: 100% (27 Sep 2023 12:00) (100% - 100%)    O2 Parameters below as of 27 Sep 2023 09:00  Patient On (Oxygen Delivery Method): nasal cannula  O2 Flow (L/min): 3            09-26 @ 07:01 - 09-27 @ 07:00  --------------------------------------------------------  IN: 2061 mL / OUT: 1700 mL / NET: 361 mL    09-27 @ 07:01  -  09-27 @ 13:07  --------------------------------------------------------  IN: 355 mL / OUT: 0 mL / NET: 355 mL      CAPILLARY BLOOD GLUCOSE      POCT Blood Glucose.: 111 mg/dL (27 Sep 2023 11:37)      PHYSICAL EXAM:  General: chronically ill, bedbound. Pt awake, alert, responds to voice. On 3L NC.  HEENT: NCAT  Respiratory: CTA b/l, no wheezing, rhonchi, rales  Cardiovascular: irregular rhythm, no M/R/G  Abdomen: soft, ND, tender to deep palpation to LLQ and RLQ, more so on LLQ, condom catheter in place.   Extremities: +1 pitting edema on the dorsum of feet. No calf tenderness, arm contracted.  Neurology: pt awake, responds to voice.     MEDICATIONS:  MEDICATIONS  (STANDING):  chlorhexidine 2% Cloths 1 Application(s) Topical <User Schedule>  diltiazem Infusion 5 mG/Hr (5 mL/Hr) IV Continuous <Continuous>  heparin  Infusion.  Unit(s)/Hr (10 mL/Hr) IV Continuous <Continuous>  influenza  Vaccine (HIGH DOSE) 0.7 milliLiter(s) IntraMuscular once  latanoprost 0.005% Ophthalmic Solution 1 Drop(s) Both EYES at bedtime  mupirocin 2% Nasal 1 Application(s) Both Nostrils two times a day  pantoprazole  Injectable 40 milliGRAM(s) IV Push daily  piperacillin/tazobactam IVPB.. 3.375 Gram(s) IV Intermittent every 8 hours    MEDICATIONS  (PRN):  heparin   Injectable 4000 Unit(s) IV Push every 6 hours PRN For aPTT less than 40  heparin   Injectable 2000 Unit(s) IV Push every 6 hours PRN For aPTT between 40 - 57      ALLERGIES:  Allergies    No Known Allergies    Intolerances        LABS:                        9.4    6.34  )-----------( 174      ( 27 Sep 2023 05:25 )             27.9     09-27    141  |  102  |  20  ----------------------------<  109<H>  3.2<L>   |  29  |  1.30    Ca    8.4<L>      27 Sep 2023 05:25  Phos  3.2     09-27  Mg     2.2     09-27    TPro  5.5<L>  /  Alb  2.7<L>  /  TBili  0.5  /  DBili  x   /  AST  12<L>  /  ALT  17  /  AlkPhos  109  09-25    PT/INR - ( 27 Sep 2023 05:25 )   PT: 12.5 sec;   INR: 1.07 ratio         PTT - ( 27 Sep 2023 05:25 )  PTT:129.0 sec  Urinalysis Basic - ( 27 Sep 2023 05:25 )    Color: x / Appearance: x / SG: x / pH: x  Gluc: 109 mg/dL / Ketone: x  / Bili: x / Urobili: x   Blood: x / Protein: x / Nitrite: x   Leuk Esterase: x / RBC: x / WBC x   Sq Epi: x / Non Sq Epi: x / Bacteria: x        RADIOLOGY & ADDITIONAL TESTS: Reviewed.     ACC: [id number] EXAM:  XR HUMERUS MIN 2 VIEWS BI   ORDERED BY: REBECCA BERNAL     PROCEDURE DATE:  09/26/2023          INTERPRETATION:  Clinical history: 73-year-old male, new lucencies.    Three views of each humerus are correlated with a concurrent chest CT.    FINDINGS: Innumerable tiny cortical lucencies in each humeral shaft,   better characterized on concurrent chest CT. No fracture or dislocation.    IMPRESSION:    Small tiny cortical lucencies in each humeral shaft, better characterized   on concurrent CT. Differential diagnosis includes multiple myeloma    --- End of Report ---          DANIAL SAMS DO; Attending Radiologist  This document has been electronically signed. Sep 26 2023  1:33PM      CENTRAL LINE: N        DATE INSERTED:             REMOVE: N  O[place]: N                       DATE INSERTED:              REMOVE: Y/N  A-LINE: N                       DATE INSERTED:              REMOVE: Y/N      GLOBAL ISSUE/BEST PRACTICE  Analgesia: N  Sedation: N  HOB elevation: yes  Stress ulcer prophylaxis: Y  VTE prophylaxis: Y  Glycemic control: N  Nutrition: Y    CODE STATUS: Full Code INTERVAL HPI/OVERNIGHT EVENTS: no overnight events, pt afebrile    SUBJECTIVE: Patient seen and examined at bedside. Pt is comfortable, responds to voice, answering simple questions. He states he feels "good". Denies pain, chest pain, abd pain, SOB, nausea    ROS:  HENMT: No HA, dizziness  RESPIRATORY: No shortness of breath.  CARDIOVASCULAR: No chest pain  GASTROINTESTINAL: No abdominal pain. No nausea       OBJECTIVE:    VITAL SIGNS:  ICU Vital Signs Last 24 Hrs  T(C): 36.4 (27 Sep 2023 12:09), Max: 36.9 (26 Sep 2023 20:38)  T(F): 97.6 (27 Sep 2023 12:09), Max: 98.4 (26 Sep 2023 20:38)  HR: 109 (27 Sep 2023 12:00) (81 - 127)  BP: 167/81 (27 Sep 2023 12:00) (121/73 - 167/81)  BP(mean): 113 (27 Sep 2023 12:00) (92 - 113)  ABP: --  ABP(mean): --  RR: 7 (27 Sep 2023 12:00) (7 - 21)  SpO2: 100% (27 Sep 2023 12:00) (100% - 100%)    O2 Parameters below as of 27 Sep 2023 09:00  Patient On (Oxygen Delivery Method): nasal cannula  O2 Flow (L/min): 3            09-26 @ 07:01 - 09-27 @ 07:00  --------------------------------------------------------  IN: 2061 mL / OUT: 1700 mL / NET: 361 mL    09-27 @ 07:01  -  09-27 @ 13:07  --------------------------------------------------------  IN: 355 mL / OUT: 0 mL / NET: 355 mL      CAPILLARY BLOOD GLUCOSE      POCT Blood Glucose.: 111 mg/dL (27 Sep 2023 11:37)      PHYSICAL EXAM:  General: chronically ill, bedbound. Pt awake, alert, responds to voice. On 3L NC.  HEENT: NCAT  Respiratory: CTA b/l, no wheezing, rhonchi, rales  Cardiovascular: irregular rhythm, no M/R/G  Abdomen: soft, ND, tender to deep palpation to LLQ and RLQ, more so on LLQ, condom catheter in place.   Extremities: +1 pitting edema on the dorsum of feet. No calf tenderness, arm contracted.  Neurology: pt awake, responds to voice. Pt is able to answer simple questions.    MEDICATIONS:  MEDICATIONS  (STANDING):  chlorhexidine 2% Cloths 1 Application(s) Topical <User Schedule>  diltiazem Infusion 5 mG/Hr (5 mL/Hr) IV Continuous <Continuous>  heparin  Infusion.  Unit(s)/Hr (10 mL/Hr) IV Continuous <Continuous>  influenza  Vaccine (HIGH DOSE) 0.7 milliLiter(s) IntraMuscular once  latanoprost 0.005% Ophthalmic Solution 1 Drop(s) Both EYES at bedtime  mupirocin 2% Nasal 1 Application(s) Both Nostrils two times a day  pantoprazole  Injectable 40 milliGRAM(s) IV Push daily  piperacillin/tazobactam IVPB.. 3.375 Gram(s) IV Intermittent every 8 hours    MEDICATIONS  (PRN):  heparin   Injectable 4000 Unit(s) IV Push every 6 hours PRN For aPTT less than 40  heparin   Injectable 2000 Unit(s) IV Push every 6 hours PRN For aPTT between 40 - 57      ALLERGIES:  Allergies    No Known Allergies    Intolerances        LABS:                        9.4    6.34  )-----------( 174      ( 27 Sep 2023 05:25 )             27.9     09-27    141  |  102  |  20  ----------------------------<  109<H>  3.2<L>   |  29  |  1.30    Ca    8.4<L>      27 Sep 2023 05:25  Phos  3.2     09-27  Mg     2.2     09-27    TPro  5.5<L>  /  Alb  2.7<L>  /  TBili  0.5  /  DBili  x   /  AST  12<L>  /  ALT  17  /  AlkPhos  109  09-25    PT/INR - ( 27 Sep 2023 05:25 )   PT: 12.5 sec;   INR: 1.07 ratio         PTT - ( 27 Sep 2023 05:25 )  PTT:129.0 sec  Urinalysis Basic - ( 27 Sep 2023 05:25 )    Color: x / Appearance: x / SG: x / pH: x  Gluc: 109 mg/dL / Ketone: x  / Bili: x / Urobili: x   Blood: x / Protein: x / Nitrite: x   Leuk Esterase: x / RBC: x / WBC x   Sq Epi: x / Non Sq Epi: x / Bacteria: x        RADIOLOGY & ADDITIONAL TESTS: Reviewed.       ACC: [id number] EXAM:  XR HUMERUS MIN 2 VIEWS BI   ORDERED BY: REBECCA BERNAL     PROCEDURE DATE:  09/26/2023          INTERPRETATION:  Clinical history: 73-year-old male, new lucencies.    Three views of each humerus are correlated with a concurrent chest CT.    FINDINGS: Innumerable tiny cortical lucencies in each humeral shaft,   better characterized on concurrent chest CT. No fracture or dislocation.    IMPRESSION:    Small tiny cortical lucencies in each humeral shaft, better characterized   on concurrent CT. Differential diagnosis includes multiple myeloma    --- End of Report ---          DANIAL SAMS DO; Attending Radiologist  This document has been electronically signed. Sep 26 2023  1:33PM      CENTRAL LINE: N        DATE INSERTED:             REMOVE: N  OAKLEY: N                       DATE INSERTED:              REMOVE: Y/N  A-LINE: N                       DATE INSERTED:              REMOVE: Y/N      GLOBAL ISSUE/BEST PRACTICE  Analgesia: N  Sedation: N  HOB elevation: yes  Stress ulcer prophylaxis: Y  VTE prophylaxis: Y  Glycemic control: N  Nutrition: Y    CODE STATUS: Full Code

## 2023-09-27 NOTE — CARE COORDINATION ASSESSMENT. - NSDCPLANSERVICES_GEN_ALL_CORE
Anticipated dc back to home with reinstatement of home care services from [facility] when medically cleared. CM to follow. SW to remain available./Home Care

## 2023-09-27 NOTE — PROGRESS NOTE ADULT - REASON FOR ADMISSION
A- fib with RVR and lactic acidosis lactic acidosis, severe sepsis due to suspected aspiration PNA and colitis, A- fib with RVR

## 2023-09-27 NOTE — DIETITIAN INITIAL EVALUATION ADULT - DIET TYPE
DASH/TLC (sodium and cholesterol restricted diet)/soft and bite-sized/consistent carbohydrate (evening snack)

## 2023-09-27 NOTE — SWALLOW BEDSIDE ASSESSMENT ADULT - SWALLOW EVAL: DIAGNOSIS
1. Mild oral dysphagia for puree, soft and bite sized solids, moderately-thick, mildly-thick and thin liquids marked by adequate acceptance and containment, adequate mastication of solids, inconsistent delay in oral transit and adequate oral clearance. 2. Mild-moderate oral dysphagia for regular solids marked by slow mastication, delayed oral transit and adequate oral clearance. 3. Functional pharyngeal phase for aforementioned consistencies marked by hyolaryngeal excursion present upon palpation and no overt s/s of penetration/aspiration evidenced across trials.

## 2023-09-27 NOTE — PHARMACOTHERAPY INTERVENTION NOTE - COMMENTS
74 yo male admitted for Afib with RVR. Of note:    -Recommended stopping Diltiazem infusion with rate of 5 mL/hr. Using conversion calculation, recommended transitioning to Diltiazem 60 mg q8h. Recommendation accepted and order entered.   -Recommended Mupirocin stop date for 10/01 to follow 5 day course and decolonization protocol. Recommendation accepted and order updated.

## 2023-09-27 NOTE — CARE COORDINATION ASSESSMENT. - PRO ARRIVE FROM
Pt lives in a private house with his spouse/Love Jarrell, the pt uses a ramp to get inside the house./home

## 2023-09-27 NOTE — PROGRESS NOTE ADULT - PROBLEM SELECTOR PLAN 6
TBI (s/p SDH with EVAC on 9/2021), and traumatic subdural hemorrhage in 10/2022   at baseline   AAOx1 talks occasionally 1 -2 words TBI (s/p SDH with EVAC on 9/2021), and traumatic subdural hemorrhage in 10/2022   at baseline   AAOx1 talks occasionally 1 -2 words at baseline; bedbound  functional quadriplegia -- c/w nursing care for all ADLs

## 2023-09-27 NOTE — PROGRESS NOTE ADULT - ASSESSMENT
74 y/o M with PMHx DM2 on metformin with peripheral neuropathy, HTN, HLD A-fib (on Eliquis), TBI (s/p SDH with EVAC on 9/2021), and traumatic subdural hemorrhage in 10/2022 Covid Pna, and major depression presented to the ED with c/o vomiting and diarrhea started today. CT chest  A/P shows: Bibasilar airspace opacities suspicious for infection.  Rectosigmoid colitis. Admitted for Afib with RVR, Lactemia (GORDON/SHERLYN?) and Sepsis 2/2  Rectosigmoid colitis in  ICU    72yo M with PMHx DM2 on metformin with peripheral neuropathy, HTN, HLD A-fib (on Eliquis), TBI (s/p SDH with EVAC on 9/2021), and traumatic subdural hemorrhage in 10/2022 Covid Pna, and major depression presented to the ED with c/o vomiting and diarrhea a/w severe lactic acidosis, severe sepsis due to suspected aspiration PNA, rectosigmoid colitis, and likely in part due to metformin toxicity in setting of LATOYA.

## 2023-09-27 NOTE — DISCHARGE NOTE PROVIDER - NSDCFUSCHEDAPPT_GEN_ALL_CORE_FT
Mirta Goldsmith  [facility] Physician [facility]  CARDIOLOGY [address]  Scheduled Appointment: 10/12/2023    Richard Mahtis  [facility] Physician [facility]  INTMED [address]  Scheduled Appointment: 11/08/2023

## 2023-09-27 NOTE — CARE COORDINATION ASSESSMENT. - REASON FOR CONSULT
SW met with pt at bedside in the ICU in order to conduct assessment and to discuss SW roles with good understanding. Pt deferred to his spouse, SW phoned Mrs. Love Jarrell at ([phone]) in order to conduct assessment./coordination of care/discharge planning

## 2023-09-27 NOTE — PROGRESS NOTE ADULT - CONVERSATION DETAILS
Discussed prior MOLST.  Confirmed that pt is DNR but would want trial of intubation.  Completed new MOLST and order entered

## 2023-09-27 NOTE — DISCHARGE NOTE PROVIDER - CARE PROVIDER_API CALL
Richard Mathis  Gastroenterology  [address], [address]  [place] [address]  Phone: [phone]  Fax: [phone]  Follow Up Time:     Mirta Goldsmith  Cardiology  [address] [place] [address]  Phone: [phone]  Fax: [phone]  Follow Up Time:

## 2023-09-27 NOTE — PROGRESS NOTE ADULT - PROBLEM SELECTOR PLAN 2
Meet sepsis criteria elevated lactate, leukocytosis, tachycardia   source - CT chest  A/P shows: Bibasilar airspace opacities suspicious for infection.  Rectosigmoid colitis.  - CXR: No active infiltrates. Humeral lucencies requiring further evaluation neoplastic involvement is a consideration. Follow-up with bone scan and/or MR.  - CT chest  A/P shows: Bibasilar airspace opacities suspicious for infection.  Rectosigmoid colitis.  -  Diabetic on Metformin. Lactate of 14 on admission.  Suspect GORDON  - Lactate down to 5.4 yest.  - Continue to trend Lactate and renal fx. If true metformin associated, observe for hypoglycemia.   - Leukocytosis now resolved, 6.34  - GI PCR neg.  - Recommend uPNA, Legionella  - BCx NG @ 24h, UCx NG  - MRSA positive, Bactroban  - Care per ICU - Patient on admission with a fib wit RVR  at rates 120-130's, at times ranges >170's in the setting of sepsis  - EKG: A fib with RVR 128bpm QTc 458 ms    - s/p Cardizem 10 mg IV X1 Zofran 4mg IV X1 zosyn x1 vanco x1  3200cc NS and Cardizem infusion 225ML/HR  in ER  - Transition Cardizem to PO  - No evidence of volume overload  - Transition Heparin GTT to Eliquis PO, cleared by S/S  - Had a normal TTE in 8/15/23.  No need to repeat  - Cardio following Dr Sepulveda, following, recs appreciated  - Care per ICU

## 2023-09-27 NOTE — PROGRESS NOTE ADULT - PROBLEM SELECTOR PLAN 1
-  Patient on admission with a fib wit RVR  at rates 120-130's , at times ranges >170's in the setting of sepsis  -  EKG: A fib with RVR 128bpm QTc 458 ms    - s/p Cardizem 10 mg IV X1 Zofran 4mg IV X1 zosyn x1 vanco x1  3200cc NS and Cardizem infusion 225ML/HR  in ER  - Transition Cardizem to PO  - No evidence of volume overload  - Transition Heparin GTT to Eliquis PO, cleared by S/S  - Had a normal TTE in 8/15/23.  No need to repeat  - Cardio following Dr Sepulveda, following  - Care per ICU - severe sepsis (POA) due to suspected aspiration PNA and rectosigmoid colitis   - very high lactate, leukocytosis, tachycardia   - CT chest/abd/P shows: Bibasilar airspace opacities suspicious for infection.  Rectosigmoid colitis.  - CXR: No active infiltrates. Humeral lucencies requiring further evaluation neoplastic involvement is a consideration. Follow-up with bone scan and/or MR.  -  Diabetic on Metformin. Lactate of 14 on admission.  Suspect metformin-associated lactic acidosis contributing to the very high lactate especially in setting of LATOYA; rec against pt taking metformin in the future.   - Lactate down to 5.4 yesterday  - Leukocytosis now resolved, 6.34  - GI PCR neg.  - f/up Legionella urine Ag  - BCx NG @ 24h, UCx NG  - MRSA/MSSA nares positive, given Bactroban for decolonization  - consider ID eval  - decision on MRSA systemic coverage per ICU  - currently on zosyn covering colitis and most aspiration PNAs  - Care per ICU

## 2023-09-27 NOTE — PROGRESS NOTE ADULT - PROBLEM SELECTOR PLAN 5
- /60 on admission   - at home on hydralazine   - will hold as bp is soft  in the setting of sepsis   - will monitor cmp am  - DASH/TLC   - monitor BP - /60 on admission   - at home on hydralazine   - will hold anti-hypertensives as BP is low in the setting of severe sepsis   - DASH/TLC   - monitor BP

## 2023-09-27 NOTE — PROGRESS NOTE ADULT - SUBJECTIVE AND OBJECTIVE BOX
Patient is a 73y old  Male who presents with a chief complaint of A- fib with RVR and lactic acidosis (27 Sep 2023 14:51)      Subjective:  INTERVAL HPI/OVERNIGHT EVENTS: Patient seen and examined at bedside. No overnight events occurred. Patient has no complaints at this time. Denies fevers, chills, headache, lightheadedness, chest pain, dyspnea, abdominal pain, n/v/d/c.    MEDICATIONS  (STANDING):  apixaban 5 milliGRAM(s) Oral every 12 hours  chlorhexidine 2% Cloths 1 Application(s) Topical <User Schedule>  diltiazem    Tablet 60 milliGRAM(s) Oral every 8 hours  diltiazem Infusion 5 mG/Hr (5 mL/Hr) IV Continuous <Continuous>  heparin  Infusion. 600 Unit(s)/Hr (6 mL/Hr) IV Continuous <Continuous>  influenza  Vaccine (HIGH DOSE) 0.7 milliLiter(s) IntraMuscular once  latanoprost 0.005% Ophthalmic Solution 1 Drop(s) Both EYES at bedtime  mupirocin 2% Nasal 1 Application(s) Both Nostrils two times a day  pantoprazole  Injectable 40 milliGRAM(s) IV Push daily  piperacillin/tazobactam IVPB.. 3.375 Gram(s) IV Intermittent every 8 hours    MEDICATIONS  (PRN):  heparin   Injectable 2000 Unit(s) IV Push every 6 hours PRN For aPTT between 40 - 57  heparin   Injectable 4000 Unit(s) IV Push every 6 hours PRN For aPTT less than 40      Allergies    No Known Allergies    Intolerances        REVIEW OF SYSTEMS:  CONSTITUTIONAL: No fever or chills  HEENT:  No headache, no sore throat  RESPIRATORY: No cough, wheezing, or shortness of breath  CARDIOVASCULAR: No chest pain, palpitations  GASTROINTESTINAL: No abd pain, nausea, vomiting, or diarrhea  GENITOURINARY: No dysuria, frequency, or hematuria  NEUROLOGICAL: no focal weakness or dizziness  MUSCULOSKELETAL: no myalgias     Objective:  Vital Signs Last 24 Hrs  T(C): 36.4 (27 Sep 2023 12:09), Max: 36.9 (26 Sep 2023 20:38)  T(F): 97.6 (27 Sep 2023 12:09), Max: 98.4 (26 Sep 2023 20:38)  HR: 93 (27 Sep 2023 14:00) (81 - 109)  BP: 157/97 (27 Sep 2023 14:00) (121/73 - 167/81)  BP(mean): 121 (27 Sep 2023 14:00) (92 - 121)  RR: 12 (27 Sep 2023 14:00) (7 - 21)  SpO2: 100% (27 Sep 2023 14:00) (100% - 100%)    Parameters below as of 27 Sep 2023 09:00  Patient On (Oxygen Delivery Method): nasal cannula  O2 Flow (L/min): 3      GENERAL: NAD, lying in bed comfortably  HEAD:  Atraumatic, Normocephalic  EYES: EOMI, PERRLA, conjunctiva and sclera clear  ENT: Moist mucous membranes  NECK: Supple, No JVD  CHEST/LUNG: Clear to auscultation bilaterally; No rales, rhonchi, wheezing, or rubs. Unlabored respirations  HEART: Regular rate and rhythm; No murmurs, rubs, or gallops  ABDOMEN: Bowel sounds present; Soft, Nontender, Nondistended. No hepatomegaly  EXTREMITIES:  2+ Peripheral Pulses, brisk capillary refill. No clubbing, cyanosis, or edema  NERVOUS SYSTEM:  Alert & Oriented X3, speech clear. No deficits   MSK: FROM all 4 extremities, full and equal strength  SKIN: No rashes or lesions    LABS:                        9.4    6.34  )-----------( 174      ( 27 Sep 2023 05:25 )             27.9     CBC Full  -  ( 27 Sep 2023 05:25 )  WBC Count : 6.34 K/uL  Hemoglobin : 9.4 g/dL  Hematocrit : 27.9 %  Platelet Count - Automated : 174 K/uL  Mean Cell Volume : 77.5 fl  Mean Cell Hemoglobin : 26.1 pg  Mean Cell Hemoglobin Concentration : 33.7 gm/dL  Auto Neutrophil # : 4.78 K/uL  Auto Lymphocyte # : 0.88 K/uL  Auto Monocyte # : 0.57 K/uL  Auto Eosinophil # : 0.04 K/uL  Auto Basophil # : 0.05 K/uL  Auto Neutrophil % : 75.4 %  Auto Lymphocyte % : 13.9 %  Auto Monocyte % : 9.0 %  Auto Eosinophil % : 0.6 %  Auto Basophil % : 0.8 %    27 Sep 2023 05:25    141    |  102    |  20     ----------------------------<  109    3.2     |  29     |  1.30     Ca    8.4        27 Sep 2023 05:25  Phos  3.2       27 Sep 2023 05:25  Mg     2.2       27 Sep 2023 05:25      PT/INR - ( 27 Sep 2023 05:25 )   PT: 12.5 sec;   INR: 1.07 ratio         PTT - ( 27 Sep 2023 13:50 )  PTT:66.6 sec  Urinalysis Basic - ( 27 Sep 2023 05:25 )    Color: x / Appearance: x / SG: x / pH: x  Gluc: 109 mg/dL / Ketone: x  / Bili: x / Urobili: x   Blood: x / Protein: x / Nitrite: x   Leuk Esterase: x / RBC: x / WBC x   Sq Epi: x / Non Sq Epi: x / Bacteria: x      CAPILLARY BLOOD GLUCOSE      POCT Blood Glucose.: 111 mg/dL (27 Sep 2023 11:37)  POCT Blood Glucose.: 117 mg/dL (27 Sep 2023 07:13)  POCT Blood Glucose.: 131 mg/dL (27 Sep 2023 00:05)  POCT Blood Glucose.: 133 mg/dL (26 Sep 2023 17:14)        Culture - Blood (collected 09-25-23 @ 16:43)  Source: .Blood Blood-Peripheral  Preliminary Report (09-26-23 @ 22:01):    No growth at 24 hours    Culture - Urine (collected 09-25-23 @ 16:05)  Source: Clean Catch Clean Catch (Midstream)  Final Report (09-26-23 @ 22:51):    No growth    Culture - Blood (collected 09-25-23 @ 14:20)  Source: .Blood Blood-Peripheral  Preliminary Report (09-26-23 @ 22:01):    No growth at 24 hours        RADIOLOGY & ADDITIONAL TESTS:    Personally reviewed.     Consultant(s) Notes Reviewed:  [x] YES  [ ] NO

## 2023-09-27 NOTE — DIETITIAN INITIAL EVALUATION ADULT - PERTINENT MEDS FT
MEDICATIONS  (STANDING):  apixaban 5 milliGRAM(s) Oral every 12 hours  chlorhexidine 2% Cloths 1 Application(s) Topical <User Schedule>  diltiazem    Tablet 60 milliGRAM(s) Oral every 8 hours  diltiazem Infusion 5 mG/Hr (5 mL/Hr) IV Continuous <Continuous>  heparin  Infusion. 600 Unit(s)/Hr (6 mL/Hr) IV Continuous <Continuous>  influenza  Vaccine (HIGH DOSE) 0.7 milliLiter(s) IntraMuscular once  latanoprost 0.005% Ophthalmic Solution 1 Drop(s) Both EYES at bedtime  mupirocin 2% Nasal 1 Application(s) Both Nostrils two times a day  pantoprazole  Injectable 40 milliGRAM(s) IV Push daily  piperacillin/tazobactam IVPB.. 3.375 Gram(s) IV Intermittent every 8 hours    MEDICATIONS  (PRN):  heparin   Injectable 4000 Unit(s) IV Push every 6 hours PRN For aPTT less than 40  heparin   Injectable 2000 Unit(s) IV Push every 6 hours PRN For aPTT between 40 - 57

## 2023-09-27 NOTE — SWALLOW BEDSIDE ASSESSMENT ADULT - COMMENTS
As per Hospitalist note dated 9/26/23 "72 y/o M with PMHx DM2 on metformin with peripheral neuropathy, HTN, HLD A-fib (on Eliquis), TBI (s/p SDH with EVAC on 9/2021), and traumatic subdural hemorrhage in 10/2022 Covid Pna, and major depression presented to the ED with c/o vomiting and diarrhea started today. CT chest  A/P shows: Bibasilar airspace opacities suspicious for infection.  Rectosigmoid colitis. Admitted for Afib with RVR, Lactemia (GORDON/SHERLYN?) and Sepsis 2/2  Rectosigmoid colitis in  ICU"    CT Chest 9/25/23 "IMPRESSION: Bibasilar airspace opacities suspicious for infection. Rectosigmoid colitis."    Patient is known to this service, seen during multiple previous admissions. Notably, patient seen for an MBS on 7/5/22 and 11/28/22. Most recently, patient was seen for a clinical swallow evaluation on 8/15/23 with recommendation of Soft and Bite Sized Solids with Thin Liquids.   Of note, patient is also known to SLP department at [facility]. (See notes for details).    Patient visited at bedside for clinical swallow evaluation. Patient presents as awake and alert, able to follow 1-step directives. Verbally responsive with 1-2 word utterances. Patient receiving supplemental oxygen via nasal cannula.

## 2023-09-27 NOTE — DISCHARGE NOTE PROVIDER - NSDCCPCAREPLAN_GEN_ALL_CORE_FT
PRINCIPAL DISCHARGE DIAGNOSIS  Diagnosis: Rapid atrial fibrillation  Assessment and Plan of Treatment:       SECONDARY DISCHARGE DIAGNOSES  Diagnosis: Sepsis  Assessment and Plan of Treatment:      PRINCIPAL DISCHARGE DIAGNOSIS  Diagnosis: Rapid atrial fibrillation  Assessment and Plan of Treatment:       SECONDARY DISCHARGE DIAGNOSES  Diagnosis: Sepsis  Assessment and Plan of Treatment:     Diagnosis: Acute kidney injury superimposed on CKD  Assessment and Plan of Treatment:     Diagnosis: Colitis  Assessment and Plan of Treatment:     Diagnosis: Constipation  Assessment and Plan of Treatment: Recommend start using Miralax once per day, and if needed add senna once per day.    Diagnosis: DM2 (diabetes mellitus, type 2)  Assessment and Plan of Treatment: Chronic problem  Recommend you STOP taking Metformin, as it may be contributing to your recurrent metabolic acidosis.  FOLLOW UP with your PCP within 1 week of discharge for further diabetes management     PRINCIPAL DISCHARGE DIAGNOSIS  Diagnosis: Severe sepsis with lactic acidosis  Assessment and Plan of Treatment: You were determined to have METFORMIN-INDUCED LACTIC ACIDOSIS. Metformin may cause a rare side effect of lactic acidosis, especially in individuals who are bed-bound. Please STOP taking Metformin immediately, and FOLLOW UP with your Primary Care doctor within 1 week of discharge to continue on a different diabetes regimen.     PRINCIPAL DISCHARGE DIAGNOSIS  Diagnosis: Severe sepsis with lactic acidosis  Assessment and Plan of Treatment: You were determined to have severe infection from colitis and suspicion of pneumonia. This caused an elevation of lactic acid in your blood. This was likely complicated by significant increase in the lactic acid due to decreased kidney function and subsequent metformin toxicity that can cause acute rise in lactic acid. Do NOT take any metformin in the future.  You have completed IV antibiotics course in the hospital and do not require further antibiotics. If you have fever, shivers, difficulty breathing, or other symptoms that concern you, return to the hospital.   Follow up with PCP in a week.      SECONDARY DISCHARGE DIAGNOSES  Diagnosis: DM2 (diabetes mellitus, type 2)  Assessment and Plan of Treatment: Please STOP taking Metformin, as it is likely contributing to your recurrent metabolic acidosis. Your hemoglobin A1c is normal and you likely would not require other diabetic medication to replace it. You can monitor glucose values at home and if high before meals, can discuss with your PCP about starting a different diabetes medication.  Follow up with your PCP in a week.    Diagnosis: Neuropathy  Assessment and Plan of Treatment: Decrease gabapentin dose to half a tab three times a day for now. If there is no significant lethargy after several days of that dose, can consider going back to home dose of 1 tab three times a day.    Diagnosis: Atrial fibrillation  Assessment and Plan of Treatment: You have risks of clotting but also had had risk of bleeding with bleeding in the brain.   Please continue Eliquis at the 2.5mg twice a day dose. Continue your cardizem.  If you have change in mental status, or headache, return to the hospital.   Follow up with your cardiologist.

## 2023-09-27 NOTE — DISCHARGE NOTE PROVIDER - DETAILS OF MALNUTRITION DIAGNOSIS/DIAGNOSES
This patient has been assessed with a concern for Malnutrition and was treated during this hospitalization for the following Nutrition diagnosis/diagnoses:     -  09/27/2023: Moderate protein-calorie malnutrition

## 2023-09-27 NOTE — DISCHARGE NOTE PROVIDER - NSDCMRMEDTOKEN_GEN_ALL_CORE_FT
apixaban 2.5 mg oral tablet: 1 tab(s) orally every 12 hours  Cardizem 60 mg oral tablet: 1 tab(s) orally every 6 hours  gabapentin 400 mg oral tablet: 1 tab(s) orally 3 times a day  hydrALAZINE 50 mg oral tablet: 1 tab(s) orally 3 times a day  latanoprost 0.005% ophthalmic solution: 1 in each eye once a day (at bedtime)  metFORMIN 1000 mg oral tablet: 1 tab(s) orally 2 times a day   mirtazapine 7.5 mg oral tablet: 1 tab(s) orally once a day  modafinil 100 mg oral tablet: 1 orally once a day  mupirocin 2% topical ointment: Apply topically to affected area 2 times a day  Protonix 40 mg oral delayed release tablet: 1 tab(s) orally once a day   apixaban 2.5 mg oral tablet: 1 tab(s) orally every 12 hours  Cardizem 60 mg oral tablet: 1 tab(s) orally every 6 hours  gabapentin 400 mg oral tablet: 1 tab(s) orally 3 times a day  hydrALAZINE 25 mg oral tablet: 1 tab(s) orally every 12 hours  latanoprost 0.005% ophthalmic solution: 1 drop(s) to each affected eye once a day (at bedtime)  mirtazapine 7.5 mg oral tablet: 1 tab(s) orally once a day  modafinil 100 mg oral tablet: 1 tab(s) orally once a day  pantoprazole 40 mg oral delayed release tablet: 1 tab(s) orally once a day (before a meal)   apixaban 2.5 mg oral tablet: 1 tab(s) orally every 12 hours  Cardizem 60 mg oral tablet: 1 tab(s) orally every 6 hours  gabapentin 400 mg oral tablet: 0.5 tab(s) orally 3 times a day  hydrALAZINE 25 mg oral tablet: 1 tab(s) orally every 12 hours  latanoprost 0.005% ophthalmic solution: 1 drop(s) to each affected eye once a day (at bedtime)  mirtazapine 7.5 mg oral tablet: 1 tab(s) orally once a day  modafinil 100 mg oral tablet: 1 tab(s) orally once a day  pantoprazole 40 mg oral delayed release tablet: 1 tab(s) orally once a day (before a meal)

## 2023-09-27 NOTE — DIETITIAN INITIAL EVALUATION ADULT - ORAL INTAKE PTA/DIET HISTORY
Pt not able to provide reliable hx.  Attempted to call wife for collateral hx but no answer at #listed. RN feeding pt lunch. Only ate a few tsp of pasta. Historical chart review notes wt of 123.5# ~1 mo ago.

## 2023-09-27 NOTE — DISCHARGE NOTE PROVIDER - CARE PROVIDERS DIRECT ADDRESSES
no complications no complications no complications ,[email or web address]

## 2023-09-27 NOTE — PROGRESS NOTE ADULT - ASSESSMENT
72 y/o M with CAD, Afib, HTN, HLD, BPH, peripheral neuropathy, SDH s/p craniotomy, Covid Pna, and major depression presented to the ED c/o vomiting and diarrhea, found to be in sepsis and rapid Afib.    Rapid Afib/Sepsis  - Was recently admitted for sepsis in the setting of Pna requiring ICU admission  - With known Hx of Afib and on Eliquis half a dose (half dose due to history of SDH?)  - rates were quite high, now improved after ivf and dilt gtt   - Can hold Eliquis for now and continue heparin gtt for now. Eventual Eliquis.   - Would stop Dilt gtt and transition to PO Dilt regimen, seems able to take PO at this time    - no clear vol ol  - Had TTE which was tls but overall preserved ef in 8/15/23.  No need to repeat    - no acute ischemia  - presumed CAD on the basis of inferior q waves, but no definite cad  - Would resume statin when able    - BP stable and controlled but would hold home Hydralazine for now in the setting of sepsis  - Monitor electrolytes, replete to keep K>4 and Mag>2

## 2023-09-27 NOTE — PROGRESS NOTE ADULT - PROBLEM SELECTOR PLAN 3
On admission  BUN/CR: 24/1.4  : Baseline 0.6-0.8, per chart review, today 20/1.3   Likely ATN 2/2 prerenal azotemia from dehydration and decreased perfusion  - Recommend Nephro consult  - Sodium Bicarb gtt d/c'd  - K+ repleted  - Care per ICU - On admission  BUN/Cr: 24/1.4  - Baseline 0.6-0.8, per chart review, today 20/1.3  - Likely ATN 2/2 sepsis   - consider Nephro eval  - Sodium Bicarb gtt d/c'd  - K+ repleted  - given pt is bedbound with very low muscle mass, his renal function is likely much more poor than appears on GFR / CrCl calculations for patients of this body weight   - rec against future use of metformin  - Care per ICU

## 2023-09-27 NOTE — DISCHARGE NOTE PROVIDER - NSD[facility]_MEDRECSTATUS_GEN_ALL_CORE
Admission Reconciliation is Not Complete  Discharge Reconciliation is Not Complete 89 y/o  F w/ PMH of Afib on Eliquis s/p multiple ablations, Spinal Stenosis, HTN, HLD, CHB s/p PPM, pHTN and severe AS presents for elective [facility]NOY. Echocardiogram from 2/17/22 showed progression to severe AS from prior echo in 6/2020 which showed moderate AS. Pt admits to occasionally having reduced exercise tolerance but has not become more severe. CONY will evaluate severity of AS in preparation for possible AVR/TAVR. Pt denies N/V/D, fevers, chills, cough, palpitations, chest pain, syncope, dyspnea on exertion, orthopnea, nocturnal paroxysmal dyspnea, edema, cyanosis, varicosities, phlebitis, claudication.    < from: CT Heart without Coronaries w/ IV Cont (04.15.22 @ 12:51) >  MPRESSION:  1. Calcifiedaortic valve. The calcium score of the aortic valve is 1254.  2. Please see the body of the report for aortic and peripheral access  vessel measurements.  3. 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 Correlation with echocardiography is   recommended.                                   89 y/o  F w/ PMH of Afib on Eliquis last dose 4/24/22, s/p multiple ablations and DCCV, Spinal Stenosis, HTN, HLD, CHB s/p PPM, pHTN and severe AS presents for [facility]  in preparation for possible AVR/TAVR. Echocardiogram from 2/17/22 showed progression to severe AS from prior echo in 6/2020 which showed moderate AS. Pt admits to occasionally having reduced exercise tolerance but has not become more severe. Pt denies N/V/D, fevers, chills, cough, palpitations, chest pain, syncope, dyspnea on exertion, orthopnea, nocturnal paroxysmal dyspnea, edema, cyanosis, varicosities, phlebitis, claudication.    < from: CT Heart without Coronaries w/ IV Cont (04.15.22 @ 12:51) >  MPRESSION:  1. Calcifiedaortic valve. The calcium score of the aortic valve is 1254.  2. Please see the body of the report for aortic and peripheral access  vessel measurements.  3. 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 Correlation with echocardiography is   recommended.                                   Admission Reconciliation is Not Complete  Discharge Reconciliation is Completed

## 2023-09-27 NOTE — DISCHARGE NOTE PROVIDER - HOSPITAL COURSE
HPI:  72 y/o M with PMHx T2DM on metformin with peripheral neuropathy, HTN, HLD A-fib (on Eliquis), TBI (s/p SDH with EVAC on 9/2021), and traumatic subdural hemorrhage in 10/2022 Covid Pna, and major depression presented to the ED with c/o vomiting and diarrhea started today. Patient is AA0x1 sometimes answer questions. Hx taken from wife ( HCP) Love Jarrell, reports he was doing fine till today, when he started vomiting non stop with liquid BM for at least 1 1/2 hrs this am. Reports she crushed metformin at home, as pill is too big to swallow. At home, he has home aide 10/hr per day, bedbound at baseline. In ER, pt found to be in sepsis with lactate of 14 and in rapid Afib.  Patient took am dose of Eliquis.  Per wife, he has constipation for past 6 days but started having diarrhea after stool softener Senna x1 given yesterday .Per spouse, patient has been lethargic but more so today  Denies fever, chills, SOB, STACK, CP or palpitations.    Past  admission for similar presentation found to have PNA  with septic shock. ICU consulted for Afib with RVR as well as lactacidosis       IN ED  VITALS  /60  RR 22 sp02 92 % ON 5 L NC   PERTINENT LABS: Wbc 29 K neutrophil predominant 92% BUN/CR: 24/1.4  blood glucose 127 ALK po4 173 lactate 10.3 --> 14   CXR: No active infiltrates. Humeral lucencies requiring further evaluation neoplastic involvement is a consideration. Follow-up with bone scan and/or MR.    CT chest  A/P and CT head ordered   EKG: A fib with RVR 128bpm QTc 458 ms   ICU transfer for A fib with RVR  and lactacidosis   s/p Cardizem 10 mg IV X1 Zofran 4mg IV X1 zosyn x1 vanco x1  3200cc NS and Cardizem infusion 225ML/HR  in ER      (25 Sep 2023 19:19)      ---  HOSPITAL COURSE:       Patient is stable for discharge as per primary medical team and consultants.    PT consulted, recommends discharge ______    Patient showed improvement throughout hospitalization. Patient was seen and examined on day of discharge. Patient was medically optimized for discharge with close outpatient follow up.        ---  CONSULTANTS:     ---  TIME SPENT:  I, the attending physician, was physically present for the key portions of the evaluation and management (E/M) service provided. The total amount of time spent reviewing the hospital notes, laboratory values, imaging findings, assessing/counseling the patient, discussing with consultant physicians, social work, nursing staff was -- minutes    ---  Primary care provider was made aware of plan for discharge:      [  ] NO     [  ] YES   HPI:  74 y/o M with PMHx T2DM on metformin with peripheral neuropathy, HTN, HLD A-fib (on Eliquis), TBI (s/p SDH with EVAC on 9/2021), and traumatic subdural hemorrhage in 10/2022 Covid Pna, and major depression presented to the ED with c/o vomiting and diarrhea started today. Patient is AA0x1 sometimes answer questions. Hx taken from wife ( HCP) Love Jarrell, reports he was doing fine till today, when he started vomiting non stop with liquid BM for at least 1 1/2 hrs this am. Reports she crushed metformin at home, as pill is too big to swallow. At home, he has home aide 10/hr per day, bedbound at baseline. In ER, pt found to be in sepsis with lactate of 14 and in rapid Afib.  Patient took am dose of Eliquis.  Per wife, he has constipation for past 6 days but started having diarrhea after stool softener Senna x1 given yesterday .Per spouse, patient has been lethargic but more so today  Denies fever, chills, SOB, STACK, CP or palpitations.    Past  admission for similar presentation found to have PNA  with septic shock. ICU consulted for Afib with RVR as well as lactacidosis       IN ED  VITALS  /60  RR 22 sp02 92 % ON 5 L NC   PERTINENT LABS: Wbc 29 K neutrophil predominant 92% BUN/CR: 24/1.4  blood glucose 127 ALK po4 173 lactate 10.3 --> 14   CXR: No active infiltrates. Humeral lucencies requiring further evaluation neoplastic involvement is a consideration. Follow-up with bone scan and/or MR.    CT chest  A/P and CT head ordered   EKG: A fib with RVR 128bpm QTc 458 ms   ICU transfer for A fib with RVR  and lactacidosis   s/p Cardizem 10 mg IV X1 Zofran 4mg IV X1 zosyn x1 vanco x1  3200cc NS and Cardizem infusion 225ML/HR  in ER      (25 Sep 2023 19:19)      ---  HOSPITAL COURSE:   74 y/o M w/ pmh of 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 Admitted to ICU for severe sepsis likely 2/2 rectosigmoid colitis, resulting in AFib with RVR and anion gap metabolic acidosis. His colitis was treated with Zosyn. GI PCR negative. He had elevated anion gap lactate acidosis, treated with bicarb gtt, which has now resolved. BCx and UCx NGTD, MRSA PCR positive, on mupirocin ointment nares. His AFib was treated with cardizem PO, and became controlled. He was anticoagulated with eliquis. He had metabolic encephalopathy  likely 2/2 sepsis, which resolved.  The pt had Humeral x-ray revealed bilateral cortical humeral lucencies, possibly multiple myeloma. Ordered SPEP, UPEP, light chains: _______    Updated 9/28      Patient is stable for discharge as per primary medical team and consultants.    PT consulted, recommends discharge ______    Patient showed improvement throughout hospitalization. Patient was seen and examined on day of discharge. Patient was medically optimized for discharge with close outpatient follow up.        ---  CONSULTANTS:     ---  TIME SPENT:  I, the attending physician, was physically present for the key portions of the evaluation and management (E/M) service provided. The total amount of time spent reviewing the hospital notes, laboratory values, imaging findings, assessing/counseling the patient, discussing with consultant physicians, social work, nursing staff was -- minutes    ---  Primary care provider was made aware of plan for discharge:      [  ] NO     [  ] YES   HPI:  72 y/o M with PMHx T2DM on metformin with peripheral neuropathy, HTN, HLD A-fib (on Eliquis), TBI (s/p SDH with EVAC on 9/2021), and traumatic subdural hemorrhage in 10/2022 Covid Pna, and major depression presented to the ED with c/o vomiting and diarrhea started today. Patient is AA0x1 sometimes answer questions. Hx taken from wife ( HCP) Love Jarrell, reports he was doing fine till today, when he started vomiting non stop with liquid BM for at least 1 1/2 hrs this am. Reports she crushed metformin at home, as pill is too big to swallow. At home, he has home aide 10/hr per day, bedbound at baseline. In ER, pt found to be in sepsis with lactate of 14 and in rapid Afib.  Patient took am dose of Eliquis.  Per wife, he has constipation for past 6 days but started having diarrhea after stool softener Senna x1 given yesterday .Per spouse, patient has been lethargic but more so today  Denies fever, chills, SOB, STACK, CP or palpitations.    Past  admission for similar presentation found to have PNA  with septic shock. ICU consulted for Afib with RVR as well as lactacidosis       IN ED  VITALS  /60  RR 22 sp02 92 % ON 5 L NC   PERTINENT LABS: Wbc 29 K neutrophil predominant 92% BUN/CR: 24/1.4  blood glucose 127 ALK po4 173 lactate 10.3 --> 14   CXR: No active infiltrates. Humeral lucencies requiring further evaluation neoplastic involvement is a consideration. Follow-up with bone scan and/or MR.    CT chest  A/P and CT head ordered   EKG: A fib with RVR 128bpm QTc 458 ms   ICU transfer for A fib with RVR  and lactic acidosis   s/p Cardizem 10 mg IV X1 Zofran 4mg IV X1 zosyn x1 vanco x1  3200cc NS and Cardizem infusion 225ML/HR  in ER      (25 Sep 2023 19:19)      ---  HOSPITAL COURSE:   72 y/o M w/ pmh of TBI from brain bleed, Afib, CAD, T2DM, presented to [facility] for n/v/d. Admitted to ICU for severe sepsis likely 2/2 rectosigmoid colitis, resulting in AFib with RVR and anion gap metabolic acidosis. His colitis was treated with Zosyn. GI PCR negative. He had elevated anion gap lactate acidosis (Lact 14 on admission), treated with bicarb gtt, which has now resolved. Given extent and duration of lactic acid elevation, patient's bed-bound status, cachexia, and hence falsely elevated creatinine clearance, suspect metformin-induced lactic acidosis. BCx and UCx NG final, MRSA PCR positive, treated with mupirocin ointment nares. His AFib was treated with cardizem PO, and became controlled. He was anticoagulated with eliquis. He had metabolic encephalopathy  likely 2/2 sepsis, which resolved. Upon downgrade from ICU to inpatient floor, antibiotics changed to Rocephin + Flagyl per ID, course completed on morning of discharge. Hgb remained in the range of 8-10 after resolution of sepsis, likely components of both iron deficiency and anemia of chronic disease with Fe, %-Sat, and TIBC all decreased. The pt had Humeral x-ray revealing bilateral cortical humeral lucencies, possibly multiple myeloma. CHARI-lambda 1.33, CHARI-kappa 1.85, wnl. Family opted to decide for further workup outpatient. Discussed with patient's wife Mony discontinuation of future metformin.    DISCHARGE PHYSICAL EXAM:  GENERAL: NAD, cachectic  HEENT:  anicteric, moist mucous membranes  CHEST/LUNG:  Auscultation limited due to pt questionable understanding, inability to follow directions for deep inspiration. decreased breath sounds, grossly CTA b/l  HEART:  irregular at 76bpm, S1, S2  ABDOMEN:  BS+, soft, nontender, nondistended  EXTREMITIES: B/L UE flexion contracture with increased tone. B/L LE muscle atrophy. no edema, cyanosis, or calf tenderness  NERVOUS SYSTEM: Nonverbal at baseline. Turns eyes to voice.      Patient is stable for discharge as per primary medical team and consultants.    PT consulted, recommends discharge Long Term Care/SNF; vs Home 24/7 assist  CM/SW consulted, re-instituting HHA.    Patient showed improvement throughout hospitalization. Patient was seen and examined on day of discharge. Patient was medically optimized for discharge with close outpatient follow up.        ---  CONSULTANTS:     Cardiology: Gaudencio Angel[facility]  ID: Ovidio Vitale  Neuro: Kalina Rivero  Critical Care: Kalyn Alfonso  Surgery: Wilfred Walters    ---  TIME SPENT:  I, the attending physician, was physically present for the key portions of the evaluation and management (E/M) service provided. The total amount of time spent reviewing the hospital notes, laboratory values, imaging findings, assessing/counseling the patient, discussing with consultant physicians, social work, nursing staff was -- minutes    ---  Primary care provider was made aware of plan for discharge:      [  ] NO     [  ] YES   HPI:  74 y/o M with PMHx T2DM on metformin with peripheral neuropathy, HTN, HLD A-fib (on Eliquis), TBI (s/p SDH with EVAC on 9/2021), and traumatic subdural hemorrhage in 10/2022 Covid Pna, and major depression presented to the ED with c/o vomiting and diarrhea started today. Patient is AA0x1 sometimes answer questions. Hx taken from wife ( HCP) Love Jarrell, reports he was doing fine till today, when he started vomiting non stop with liquid BM for at least 1 1/2 hrs this am. Reports she crushed metformin at home, as pill is too big to swallow. At home, he has home aide 10/hr per day, bedbound at baseline. In ER, pt found to be in sepsis with lactate of 14 and in rapid Afib.  Patient took am dose of Eliquis.  Per wife, he has constipation for past 6 days but started having diarrhea after stool softener Senna x1 given yesterday .Per spouse, patient has been lethargic but more so today  Denies fever, chills, SOB, STACK, CP or palpitations.    Past  admission for similar presentation found to have PNA  with septic shock. ICU consulted for Afib with RVR as well as lactacidosis       IN ED  VITALS  /60  RR 22 sp02 92 % ON 5 L NC   PERTINENT LABS: Wbc 29 K neutrophil predominant 92% BUN/CR: 24/1.4  blood glucose 127 ALK po4 173 lactate 10.3 --> 14   CXR: No active infiltrates. Humeral lucencies requiring further evaluation neoplastic involvement is a consideration. Follow-up with bone scan and/or MR.    CT chest  A/P and CT head ordered   EKG: A fib with RVR 128bpm QTc 458 ms   ICU transfer for A fib with RVR  and lactic acidosis   s/p Cardizem 10 mg IV X1 Zofran 4mg IV X1 zosyn x1 vanco x1  3200cc NS and Cardizem infusion 225ML/HR  in ER      (25 Sep 2023 19:19)      ---  HOSPITAL COURSE:   74 y/o M w/ pmh of TBI from brain bleed, Afib, CAD, T2DM, presented to [facility] for n/v/d. Admitted to ICU for severe sepsis likely 2/2 rectosigmoid colitis, resulting in AFib with RVR and anion gap metabolic acidosis. His colitis was treated with Zosyn. GI PCR negative. He had elevated anion gap lactate acidosis (Lact 14 on admission), treated with bicarb gtt, which has now resolved. Given extent and duration of lactic acid elevation, patient's bed-bound status, cachexia, and hence falsely elevated creatinine clearance, suspect metformin-induced lactic acidosis. BCx and UCx NG final, MRSA PCR positive, treated with mupirocin ointment nares. His AFib was treated with cardizem PO, and became controlled. He was anticoagulated with eliquis. He had metabolic encephalopathy  likely 2/2 sepsis, which resolved. Upon downgrade from ICU to inpatient floor, antibiotics changed to Rocephin + Flagyl per ID, course completed on morning of discharge. Hgb remained in the range of 8-10 after resolution of sepsis, likely components of both iron deficiency and anemia of chronic disease with Fe, %-Sat, and TIBC all decreased. The pt had Humeral x-ray revealing bilateral cortical humeral lucencies, possibly multiple myeloma. CHARI-lambda 1.33, CHARI-kappa 1.85, wnl. Family opted to decide for further workup outpatient. Discussed with patient's wife Mony discontinuation of future metformin.    On day of discharge:  Vital Signs Last 24 Hrs  T(C): 36.7 (30 Sep 2023 11:51), Max: 36.8 (30 Sep 2023 04:47)  T(F): 98 (30 Sep 2023 11:51), Max: 98.2 (30 Sep 2023 04:47)  HR: 100 (30 Sep 2023 11:51) (79 - 100)  BP: 163/95 (30 Sep 2023 11:51) (111/84 - 168/99)  BP(mean): --  RR: 18 (30 Sep 2023 11:51) (18 - 18)  SpO2: 98% (30 Sep 2023 11:51) (94% - 98%)    Parameters below as of 30 Sep 2023 11:51  Patient On (Oxygen Delivery Method): room air      DISCHARGE PHYSICAL EXAM:  GENERAL: NAD, cachectic  HEENT:  anicteric, moist mucous membranes  CHEST/LUNG:  Auscultation limited due to pt questionable understanding, inability to follow directions for deep inspiration. decreased breath sounds, grossly CTA b/l  HEART:  irregular at 84bpm, S1, S2  ABDOMEN:  BS+, soft, nontender, nondistended  EXTREMITIES: B/L UE flexion contracture with increased tone. B/L LE muscle atrophy. no edema, cyanosis, or calf tenderness  NERVOUS SYSTEM: Nonverbal at baseline. Turns eyes to voice.      Patient is stable for discharge as per primary medical team and consultants.    PT consulted, recommends discharge Long Term Care/SNF; vs Home 24/7 assist  CM/SW consulted, re-instituting HHA.    Patient showed improvement throughout hospitalization. Patient was seen and examined on day of discharge. Patient was medically optimized for discharge with close outpatient follow up.        ---  CONSULTANTS:     Cardiology: Gaudenico Angel  ID: Ovidio Vitale  Neuro: Kalina Rivero  Critical Care: Kalyn Alfonso  Surgery: Wilfred Walters    ---  TIME SPENT: 50min HPI:  72 y/o M with PMHx T2DM on metformin with peripheral neuropathy, HTN, HLD A-fib (on Eliquis), TBI (s/p SDH with EVAC on 9/2021), and traumatic subdural hemorrhage in 10/2022 Covid Pna, and major depression presented to the ED with c/o vomiting and diarrhea started today. Patient is AA0x1 sometimes answer questions. Hx taken from wife ( HCP) Love Jarrell, reports he was doing fine till today, when he started vomiting non stop with liquid BM for at least 1 1/2 hrs this am. Reports she crushed metformin at home, as pill is too big to swallow. At home, he has home aide 10/hr per day, bedbound at baseline. In ER, pt found to be in sepsis with lactate of 14 and in rapid Afib.  Patient took am dose of Eliquis.  Per wife, he has constipation for past 6 days but started having diarrhea after stool softener Senna x1 given yesterday .Per spouse, patient has been lethargic but more so today  Denies fever, chills, SOB, STACK, CP or palpitations.    Past  admission for similar presentation found to have PNA  with septic shock. ICU consulted for Afib with RVR as well as lactacidosis       IN ED  VITALS  /60  RR 22 sp02 92 % ON 5 L NC   PERTINENT LABS: Wbc 29 K neutrophil predominant 92% BUN/CR: 24/1.4  blood glucose 127 ALK po4 173 lactate 10.3 --> 14   CXR: No active infiltrates. Humeral lucencies requiring further evaluation neoplastic involvement is a consideration. Follow-up with bone scan and/or MR.    CT chest  A/P and CT head ordered   EKG: A fib with RVR 128bpm QTc 458 ms   ICU transfer for A fib with RVR  and lactic acidosis   s/p Cardizem 10 mg IV X1 Zofran 4mg IV X1 zosyn x1 vanco x1  3200cc NS and Cardizem infusion 225ML/HR  in ER      (25 Sep 2023 19:19)      ---  HOSPITAL COURSE:   72 y/o M w/ pmh of TBI from brain bleed, Afib, CAD, T2DM, presented to [facility] for n/v/d. Admitted to ICU for severe sepsis likely 2/2 rectosigmoid colitis and suspected aspiration pneumonia from vomiting, resulting in AFib with RVR and anion gap metabolic acidosis. His colitis and PNA were treated with Zosyn. GI PCR negative. He had elevated anion gap lactate acidosis (Lact 14 on admission), treated with bicarb gtt, which gradually resolved with aggressive ICU care. Given extent and duration of lactic acid elevation, patient's bed-bound status, cachexia, LATOYA, suspect metformin-induced lactic acidosis contributed. BCx and UCx were negative. MRSA PCR positive, treated with mupirocin ointment nares for 5 days per protocol for decolinization. His AFib w/ RVR was treated with cardizem, and treatment of sepsis and became rate controlled. He was anticoagulated with eliquis. Pt had metabolic encephalopathy likely 2/2 sepsis, and lactic acidosis which resolved and pt became alert. Upon downgrade from ICU to inpatient floor, antibiotics changed to Rocephin + Flagyl per ID, course completed on morning of discharge. Hgb remained in the range of 8-10 after resolution of sepsis, likely largely due to anemia of chronic disease. The pt had Humeral x-ray revealing bilateral cortical humeral lucencies, possibly multiple myeloma. CHARI-lambda 1.33, CHARI-kappa 1.85, wnl. Family opted to decide for further workup outpatient. Discussed with patient's wife Mony discontinuation of future metformin. Pt's PCP notified of hospital course.  PT consulted, recommends discharge Long Term Care/SNF; vs Home 24/7 assist. CM/SW consulted, re-instituting HHA. Pt discharged to home.     On day of discharge:  Vital Signs Last 24 Hrs  T(C): 36.7 (30 Sep 2023 11:51), Max: 36.8 (30 Sep 2023 04:47)  T(F): 98 (30 Sep 2023 11:51), Max: 98.2 (30 Sep 2023 04:47)  HR: 100 (30 Sep 2023 11:51) (79 - 100)  BP: 163/95 (30 Sep 2023 11:51) (111/84 - 168/99)  BP(mean): --  RR: 18 (30 Sep 2023 11:51) (18 - 18)  SpO2: 98% (30 Sep 2023 11:51) (94% - 98%)    Parameters below as of 30 Sep 2023 11:51  Patient On (Oxygen Delivery Method): room air      DISCHARGE PHYSICAL EXAM:  GENERAL: NAD, cachectic  HEENT:  anicteric, moist mucous membranes  CHEST/LUNG:  Auscultation limited due to pt questionable understanding, inability to follow directions for deep inspiration. decreased breath sounds, grossly CTA b/l  HEART:  irregular at 84bpm, S1, S2  ABDOMEN:  BS+, soft, nontender, nondistended  EXTREMITIES: B/L UE flexion contracture with increased tone. B/L LE muscle atrophy. no edema, cyanosis, or calf tenderness  NERVOUS SYSTEM: Nonverbal at baseline. Turns eyes to voice.        ---  CONSULTANTS:     Cardiology: Gaudencio Angel  ID: Ovidio Vitale  Neuro: Kalina Rivero  Critical Care: Kalyn Alfonso  Surgery: Wilfred Walters    ---  TIME SPENT: 50min

## 2023-09-27 NOTE — PROGRESS NOTE ADULT - SUBJECTIVE AND OBJECTIVE BOX
[facility] - Alexey Briceno, Agus Sepulveda, Harjit Barton  Office Number:  [phone]    Patient resting comfortably in bed in NAD.  Still on O2 NC, on broad spectrum abx for sepsis.     ROS: negative unless otherwise mentioned.    Telemetry: AF 90's    MEDICATIONS  (STANDING):  chlorhexidine 2% Cloths 1 Application(s) Topical <User Schedule>  diltiazem Infusion 5 mG/Hr (5 mL/Hr) IV Continuous <Continuous>  heparin  Infusion.  Unit(s)/Hr (10 mL/Hr) IV Continuous <Continuous>  influenza  Vaccine (HIGH DOSE) 0.7 milliLiter(s) IntraMuscular once  latanoprost 0.005% Ophthalmic Solution 1 Drop(s) Both EYES at bedtime  mupirocin 2% Nasal 1 Application(s) Both Nostrils two times a day  pantoprazole  Injectable 40 milliGRAM(s) IV Push daily  piperacillin/tazobactam IVPB.. 3.375 Gram(s) IV Intermittent every 8 hours    MEDICATIONS  (PRN):  heparin   Injectable 4000 Unit(s) IV Push every 6 hours PRN For aPTT less than 40  heparin   Injectable 2000 Unit(s) IV Push every 6 hours PRN For aPTT between 40 - 57      Allergies    No Known Allergies    Intolerances        Vital Signs Last 24 Hrs  T(C): 36.6 (27 Sep 2023 04:03), Max: 36.9 (26 Sep 2023 20:38)  T(F): 97.9 (27 Sep 2023 04:03), Max: 98.4 (26 Sep 2023 20:38)  HR: 83 (27 Sep 2023 08:00) (81 - 127)  BP: 138/94 (27 Sep 2023 08:00) (108/63 - 157/76)  BP(mean): 112 (27 Sep 2023 08:00) (80 - 112)  RR: 12 (27 Sep 2023 08:00) (9 - 21)  SpO2: 100% (27 Sep 2023 08:00) (98% - 100%)    Parameters below as of 27 Sep 2023 08:00  Patient On (Oxygen Delivery Method): nasal cannula  O2 Flow (L/min): 5      I&O's Summary    26 Sep 2023 07:01  -  27 Sep 2023 07:00  --------------------------------------------------------  IN: 2061 mL / OUT: 1700 mL / NET: 361 mL    27 Sep 2023 07:01  -  27 Sep 2023 08:07  --------------------------------------------------------  IN: 11 mL / OUT: 0 mL / NET: 11 mL        ON EXAM:    General: NAD, awake and alert  HEENT: Mucous membranes are moist, anicteric  Lungs: Non-labored, breath sounds are clear bilaterally, No wheezing, rales or rhonchi  Cardiovascular: irregular, S1 and S2, no murmurs, rubs, or gallops  Gastrointestinal: Bowel Sounds present, soft, nontender.   Lymph: No peripheral edema. No lymphadenopathy.  Skin: No rashes or ulcers  Psych:  Mood & affect appropriate    LABS: All Labs Reviewed:                        9.4    6.34  )-----------( 174      ( 27 Sep 2023 05:25 )             27.9                         11.0   23.19 )-----------( 270      ( 26 Sep 2023 04:37 )             33.5                         10.3   21.65 )-----------( 287      ( 26 Sep 2023 04:00 )             32.4     27 Sep 2023 05:25    141    |  102    |  20     ----------------------------<  109    3.2     |  29     |  1.30   26 Sep 2023 04:37    139    |  105    |  25     ----------------------------<  139    4.1     |  20     |  1.40   25 Sep 2023 22:20    141    |  107    |  25     ----------------------------<  117    4.1     |  16     |  1.20     Ca    8.4        27 Sep 2023 05:25  Ca    7.9        26 Sep 2023 04:37  Ca    7.9        25 Sep 2023 22:20  Phos  3.2       27 Sep 2023 05:25  Phos  4.3       26 Sep 2023 04:37  Phos  4.4       25 Sep 2023 22:20  Mg     2.2       27 Sep 2023 05:25  Mg     1.6       26 Sep 2023 04:37  Mg     1.6       25 Sep 2023 22:20    TPro  5.5    /  Alb  2.7    /  TBili  0.5    /  DBili  x      /  AST  12     /  ALT  17     /  AlkPhos  109    25 Sep 2023 22:20  TPro  7.7    /  Alb  3.7    /  TBili  0.7    /  DBili  x      /  AST  26     /  ALT  23     /  AlkPhos  173    25 Sep 2023 14:14    PT/INR - ( 27 Sep 2023 05:25 )   PT: 12.5 sec;   INR: 1.07 ratio         PTT - ( 27 Sep 2023 05:25 )  PTT:129.0 sec      Blood Culture: Organism --  Gram Stain Blood -- Gram Stain --  Specimen Source .Blood Blood-Peripheral  Culture-Blood --    Organism --  Gram Stain Blood -- Gram Stain --  Specimen Source Clean Catch Clean Catch (Midstream)  Culture-Blood --    Organism --  Gram Stain Blood -- Gram Stain --  Specimen Source .Blood Blood-Peripheral  Culture-Blood --      TTE 8/14/23:     CONCLUSIONS:      1. Technically difficult image quality.   2. Left ventricular systolic function is normal with an ejection fraction visually estimated at 60 to 65 %.   3. The right ventricle is not well visualized.   4. The left atrium is mildly dilated in size.   5. Aortic valve was not well visualized.   6. Trace mitral regurgitation.   7. Trace tricuspid regurgitation.

## 2023-09-27 NOTE — CARE COORDINATION ASSESSMENT. - NSCAREPROVIDERS_GEN_ALL_CORE_FT
CARE PROVIDERS:  Accepting Physician: Jesus Bhardwaj  Access Services: Chase, Vinh  Access Services: Vicente Yoo  Admitting: Jesus Bhardwaj  Attending: Kalyn Ray  Consultant: Demian Shepherd  Consultant: Bozena Cotto  Consultant: Weil, Patricia  Consultant: Maxi Du  Consultant: Ovidio Blackburn  Consultant: Allison Perez  Consultant: Catherine Cox  Consultant: Lance Kothari  Consultant: Bobo Sepulveda  Consultant: Russ Goldsmith  Consultant: Wilfred He  Consultant: Priyank Grove  Consultant: Lois Lombardi  Consultant: Chauncey Phillips  ED ACP: Rafi Gregg  ED Attending: Brianna Guthrie  ED Nurse: Prasanna Villa  Infection Control: Zink, Corinne  Nurse: Kari Lopez  Nurse: Luann Coelho  Nurse: Randi Parra  Ordered: ADM, User  Ordered: Doctor, Unknown  Outpatient Provider: Mirta Goldsmith  Outpatient Provider: Nigel Joe  Override: Alma Sandoval  PCA/Nursing Assistant: Sidney Negron  Primary Team: Lety Amor  Primary Team: Rohit Bhardwaj  Primary Team: Mj Soler  Primary Team: Lexi Doherty  Primary Team: Fabio Harden  Primary Team: Africa Cameron  Primary Team: Chauncey Lewis  Primary Team: Kristian Reynaga  Primary Team: Annie Payne  Primary Team: Serena Nguyen  Primary Team: Sabrina Fletcher  Primary Team: Onesimo Oconnor  Primary Team: Kalyn Ray  Primary Team: Juan Miguel Serrano  Quality Review: Hodan Smith  Quality Review: Megha Mclaughlin  Registered Dietitian: Jyotsna Lares  Registered Dietitian: Jolie Davison  : Suha Hurst  Speech Pathology: Lexi Ponce  Speech Pathology: Lyric Birch  Student: Berta Valentino

## 2023-09-27 NOTE — SWALLOW BEDSIDE ASSESSMENT ADULT - CONSISTENCIES ADMINISTERED
4oz; 2oz; 2oz/thin liquid/moderately thick/mildly thick x3 trials each/pureed/soft & bite-sized x3 trials/regular solid

## 2023-09-27 NOTE — PROGRESS NOTE ADULT - PROBLEM SELECTOR PLAN 7
CXR: Humeral lucencies requiring further evaluation neoplastic involvement is a consideration.   B/L: Small tiny cortical lucencies in each humeral shaft, better characterized on concurrent CT. Differential diagnosis includes multiple myeloma CXR: Humeral lucencies requiring further evaluation neoplastic involvement is a consideration.   B/L: Small tiny cortical lucencies in each humeral shaft, better characterized on concurrent CT. Differential diagnosis includes multiple myeloma  outpt f/up if family wish to pursue

## 2023-09-27 NOTE — PROGRESS NOTE ADULT - PROBLEM SELECTOR PLAN 4
at home on metformin, suspecting metformin induced LA    blood glucose 127  on admission   NPO   If true metformin associated, observe for hypoglycemia.   HOLD Anti diabetic meds  Recommend DISCONTINUE METFORMIN at home; patient cachectic, bedbound, at higher risk for metformin induced LA - at home on metformin, suspecting metformin induced lactic acidosis is contributing to very high LA level on admission  - given pt is bedbound with very low muscle mass, his renal function is likely much more poor than appears on GFR / CrCl calculations for patients of this body weight   - rec against future use of metformin  - ISS

## 2023-09-27 NOTE — DISCHARGE NOTE PROVIDER - NSDCCAREPROVSEEN_GEN_ALL_CORE_FT
[facility]       Department of Emergency Medicine   ED  Provider Note  Admit Date/RoomTime: 3/24/2020  4:54 PM  ED Room: 05/05    Chief Complaint   Cough; Nasal Congestion; and Pharyngitis    History of Present Illness   Source of history provided by:  patient. History/Exam Limitations: none. Gerardo Valencia is a 29 y.o. old male who has a past medical history of: History reviewed. No pertinent past medical history. presents to the emergency department by private vehicle, for bilateral sore throat pain, which occured this AM prior to arrival. Associated Signs & Symptoms: runny nose, productive cough for yellow phlegm that has been on going for the past 2 months and thinks it's his sinuses are acting up. Since onset the symptoms have been persistent. He is drinking plenty of fluids. States he usually smokes a half a pack of cigarettes a day he is a  and lately he has been smoking more since he has been off work he denies any fever, chills, nausea, vomiting, abdominal pain, chest pain, shortness of breath, wheezing, leg pain, leg swelling, hemoptysis, rashes or neck stiffness. He states he does usually take DayQuil for the cough at night and denies any recent travel, leg pain, leg swelling, hemoptysis or any dizziness or palpitations. He denies any recent travel. Denies any change in voice pattern and is able to handle his own secretions. Exposed To: Streptococcus: unknown. Infectious Mononucleosis:  unknown. Symptoms:  Pain:  Yes.                             Muffled Voice:  No.                            Hoarse:  No.                            Difficulty with Secretions:  No.    Centor Score (MODIFIED) For Strep Pharyngitis:    Age 3-14 Years   no (0)     Age >44 Years   NO     Exudate or Swelling on Tonsils   yes (1)     Tender/Swollen Anterior Cervical Nodes   no (0)     Fever? (T > 38°C, 100.4°F)   no (0)     Absence of Cough   no (0)   TOTAL POINTS   1   Score of Zero = Probability of Strep Pharyngitis: 1% - 2.5%. ,   No further testing nor antibiotics. Score of 1 = Probability of Strep Pharyngitis: 5% - 10%. ,   No further testing nor antibiotics. Score of 2 = Probability of Strep Phar: 11% - 17%. Culture/test all, ATB's only for positive culture results. Score of 3 = Probability of Strep Phar: 28% - 35%. Culture/test all, ATB's only for positive culture results  Score of 4 or 5 = Probability of Strep Pharyngitis: 51% - 53%. Treat empirically with antibiotics. ROS    Pertinent positives and negatives are stated within HPI, all other systems reviewed and are negative. Past Surgical History:  has no past surgical history on file. Social History:  reports that he has been smoking cigarettes. He has been smoking about 1.00 pack per day. He has never used smokeless tobacco. He reports current alcohol use. He reports that he does not use drugs. Family History: family history is not on file. Allergies: Codeine    Physical Exam           ED Triage Vitals   BP Temp Temp Source Pulse Resp SpO2 Height Weight   03/24/20 1701 03/24/20 1701 03/24/20 1701 03/24/20 1701 03/24/20 1701 03/24/20 1701 03/24/20 1708 03/24/20 1708   118/80 99.1 °F (37.3 °C) Temporal 97 16 98 % 5' 9\" (1.753 m) 260 lb (117.9 kg)     Oxygen Saturation Interpretation: Normal.    Constitutional:  Alert, development consistent with age. .  Ears:  TMs without perforation, injection, or bulging. External canals clear without exudate. Throat: Airway Patent. Teeth and gums normal., moderate erythema, tonsillar hypertrophy, 2+, exudates present, throat culture taken and mucous membranes moist.       Neck/Lymphatic:  Supple. There is no  preauricular, submental, parotid, anterior cervical, posterior cervical, supraclavicular, epitrochlear and axillary node tenderness.   respiratory:  Clear to auscultation and breath sounds equal.    CV: Regular rate and rhythm, normal heart sounds, without pathological murmurs, ectopy, gallops, or rubs. GI:  Abdomen Soft, nontender, good bowel sounds. No firm or pulsatile mass. Inegument:  No rashes, erythema present. Neurological:  Oriented. Motor functions intact. Lab / Imaging Results   (All laboratory and radiology results have been personally reviewed by myself)  Labs:  Results for orders placed or performed during the hospital encounter of 03/24/20   Strep Screen Group A Throat   Result Value Ref Range    Strep Grp A PCR POSITIVE Negative     Imaging: All Radiology results interpreted by Radiologist unless otherwise noted. XR CHEST STANDARD (2 VW)   Final Result   No acute cardiopulmonary abnormality. ED Course / Medical Decision Making     Medications   acetaminophen (TYLENOL) tablet 1,000 mg (1,000 mg Oral Given 3/24/20 1754)   Re evaluation: 1800 discussed results of chest xray with patient and positive strep. Consult(s):   None    Procedure(s):   none    MDM:   Based on moderate suspicion for Strep as per history/physical findings, Rapid Strep/Throat Culture testing was done and confirms the above findings  Pharyngitis is likely to  be Strep. Antibiotics are indicated at this time based on clinical presentation and physical findings. Not hypoxic, nothing to suggest pneumonia. Patient is well appearing, non toxic and appropriate for outpatient management. Plan of Care: Normal progression of disease discussed. All questions answered. Instruction provided in the use of fluids, vaporizer, acetaminophen, and other OTC medication for symptom control. Extra fluids  Analgesics as needed, dose reviewed. Follow up as needed should symptoms fail to improve. Follow-up in 3 days, or sooner should symptoms worsen. Counseling: The emergency provider has spoken with the patient and discussed todays results, in addition to providing specific details for the plan of care and counseling regarding the diagnosis and prognosis. Juan Miguel Serrano

## 2023-09-27 NOTE — PROGRESS NOTE ADULT - SUBJECTIVE AND OBJECTIVE BOX
SURGERY PA NOTE ON BEHALF OF DR. HE / GENERAL SURGERY:    S: Patient seen and examined at bedside.  Resting comfortably, unable to elicit any meaningful history due to patient's baseline mental status.       MEDICATIONS:  chlorhexidine 2% Cloths 1 Application(s) Topical <User Schedule>  diltiazem Infusion 5 mG/Hr IV Continuous <Continuous>  heparin   Injectable 4000 Unit(s) IV Push every 6 hours PRN  heparin   Injectable 2000 Unit(s) IV Push every 6 hours PRN  heparin  Infusion.  Unit(s)/Hr IV Continuous <Continuous>  influenza  Vaccine (HIGH DOSE) 0.7 milliLiter(s) IntraMuscular once  latanoprost 0.005% Ophthalmic Solution 1 Drop(s) Both EYES at bedtime  mupirocin 2% Nasal 1 Application(s) Both Nostrils two times a day  pantoprazole  Injectable 40 milliGRAM(s) IV Push daily  piperacillin/tazobactam IVPB.. 3.375 Gram(s) IV Intermittent every 8 hours    O:  Vital Signs Last 24 Hrs  T(C): 36.6 (27 Sep 2023 04:03), Max: 36.9 (26 Sep 2023 20:38)  T(F): 97.9 (27 Sep 2023 04:03), Max: 98.4 (26 Sep 2023 20:38)  HR: 83 (27 Sep 2023 08:00) (81 - 127)  BP: 138/94 (27 Sep 2023 08:00) (108/63 - 157/76)  BP(mean): 112 (27 Sep 2023 08:00) (80 - 112)  RR: 12 (27 Sep 2023 08:00) (9 - 21)  SpO2: 100% (27 Sep 2023 08:00) (98% - 100%)  Parameters below as of 27 Sep 2023 08:00  Patient On (Oxygen Delivery Method): nasal cannula  O2 Flow (L/min): 5    I&O SUMMARY:    09-26-23 @ 07:01  -  09-27-23 @ 07:00  --------------------------------------------------------  IN: 2061 mL / OUT: 1700 mL / NET: 361 mL    09-27-23 @ 07:01  -  09-27-23 @ 08:12  --------------------------------------------------------  IN: 11 mL / OUT: 0 mL / NET: 11 mL    PHYSICAL EXAM:  Lungs: CTA bilat without W/R/R  Card: S1S2  Abd: Soft and non-distended through 4 abd quadrants - though some localized distention at the anterior lower abdomen/pelvis - ?bladder distention.  Has condom catheter in place, with 300cc of straw-colored urine in reservoir.   No reaction to palpation throughout.  +BS x 4.    Ext: Calves soft and no reaction to squeeze bilaterally, without edema bilat    LABS:                        9.4    6.34  )-----------( 174      ( 27 Sep 2023 05:25 )             27.9     09-27    141  |  102  |  20  ----------------------------<  109<H>  3.2<L>   |  29  |  1.30    Ca    8.4<L>      27 Sep 2023 05:25  Phos  3.2     09-27  Mg     2.2     09-27    TPro  5.5<L>  /  Alb  2.7<L>  /  TBili  0.5  /  DBili  x   /  AST  12<L>  /  ALT  17  /  AlkPhos  109  09-25  PT/INR - ( 27 Sep 2023 05:25 )   PT: 12.5 sec;   INR: 1.07 ratio    PTT - ( 27 Sep 2023 05:25 )  PTT:129.0 sec    A:  74 y/o M with PMHx DM2 on metformin with peripheral neuropathy, HTN, HLD A-fib (on Eliquis), TBI (s/p SDH with EVAC on 9/2021), and traumatic subdural hemorrhage in 10/2022 Covid Pna, and major depression   P/W with c/o vomiting and diarrhea    Admission CT chest, A/P shows: Bibasilar airspace opacities suspicious for infection; rectosigmoid colitis  Admitted for Afib with RVR, Lactemia (GORDON/SHERLYN?) and Sepsis 2/2 rectosigmoid colitis in  ICU     P:  Patient currently appears comfortable  Remains afebrile, vitals stable   Leukocytosis with marked decrease, WC now normalized; H&H with slight decrease - likely dilutional 2/2 resuscitation   Renal indices improved   Hypokalemia noted, d/w ICU attending - will defer to primary team  Awaiting GI PCR, on isolation until resulted   Bladder scan to r/o retention  Acidosis improving as well, serum CO2 normalized   Patient appears to continue improving - no invasive surgical intervention currently indicated or warranted  Continue current care per primary team   Will d/w Dr. He     SURGERY PA NOTE ON BEHALF OF DR. HE / GENERAL SURGERY:    S: Patient seen and examined at bedside.  Resting comfortably, unable to elicit any meaningful history due to patient's baseline mental status.       MEDICATIONS:  chlorhexidine 2% Cloths 1 Application(s) Topical <User Schedule>  diltiazem Infusion 5 mG/Hr IV Continuous <Continuous>  heparin   Injectable 4000 Unit(s) IV Push every 6 hours PRN  heparin   Injectable 2000 Unit(s) IV Push every 6 hours PRN  heparin  Infusion.  Unit(s)/Hr IV Continuous <Continuous>  influenza  Vaccine (HIGH DOSE) 0.7 milliLiter(s) IntraMuscular once  latanoprost 0.005% Ophthalmic Solution 1 Drop(s) Both EYES at bedtime  mupirocin 2% Nasal 1 Application(s) Both Nostrils two times a day  pantoprazole  Injectable 40 milliGRAM(s) IV Push daily  piperacillin/tazobactam IVPB.. 3.375 Gram(s) IV Intermittent every 8 hours    O:  Vital Signs Last 24 Hrs  T(C): 36.6 (27 Sep 2023 04:03), Max: 36.9 (26 Sep 2023 20:38)  T(F): 97.9 (27 Sep 2023 04:03), Max: 98.4 (26 Sep 2023 20:38)  HR: 83 (27 Sep 2023 08:00) (81 - 127)  BP: 138/94 (27 Sep 2023 08:00) (108/63 - 157/76)  BP(mean): 112 (27 Sep 2023 08:00) (80 - 112)  RR: 12 (27 Sep 2023 08:00) (9 - 21)  SpO2: 100% (27 Sep 2023 08:00) (98% - 100%)  Parameters below as of 27 Sep 2023 08:00  Patient On (Oxygen Delivery Method): nasal cannula  O2 Flow (L/min): 5    I&O SUMMARY:    09-26-23 @ 07:01  -  09-27-23 @ 07:00  --------------------------------------------------------  IN: 2061 mL / OUT: 1700 mL / NET: 361 mL    09-27-23 @ 07:01  -  09-27-23 @ 08:12  --------------------------------------------------------  IN: 11 mL / OUT: 0 mL / NET: 11 mL    PHYSICAL EXAM:  Lungs: CTA bilat without W/R/R  Card: S1S2  Abd: Soft and non-distended through 4 abd quadrants - though some localized distention at the anterior lower abdomen/pelvis - ?bladder distention.  Has condom catheter in place, with 300cc of straw-colored urine in reservoir.   No reaction to palpation throughout.  +BS x 4.    Ext: Calves soft and no reaction to squeeze bilaterally, without edema bilat    LABS:                        9.4    6.34  )-----------( 174      ( 27 Sep 2023 05:25 )             27.9     09-27    141  |  102  |  20  ----------------------------<  109<H>  3.2<L>   |  29  |  1.30    Ca    8.4<L>      27 Sep 2023 05:25  Phos  3.2     09-27  Mg     2.2     09-27    TPro  5.5<L>  /  Alb  2.7<L>  /  TBili  0.5  /  DBili  x   /  AST  12<L>  /  ALT  17  /  AlkPhos  109  09-25  PT/INR - ( 27 Sep 2023 05:25 )   PT: 12.5 sec;   INR: 1.07 ratio    PTT - ( 27 Sep 2023 05:25 )  PTT:129.0 sec    A:  72 y/o M with PMHx DM2 on metformin with peripheral neuropathy, HTN, HLD A-fib (on Eliquis), TBI (s/p SDH with EVAC on 9/2021), and traumatic subdural hemorrhage in 10/2022 Covid Pna, and major depression   P/W with c/o vomiting and diarrhea    Admission CT chest, A/P shows: Bibasilar airspace opacities suspicious for infection; rectosigmoid colitis  Admitted for Afib with RVR, Lactemia (GORDON/SHERLYN?) and Sepsis 2/2 rectosigmoid colitis in  ICU     P:  Patient currently appears comfortable  Remains afebrile, vitals stable   Leukocytosis with marked decrease, WC now normalized; H&H with slight decrease - likely dilutional 2/2 resuscitation   Renal indices improved   Hypokalemia noted, d/w ICU attending - will defer to primary team  Awaiting GI PCR, on isolation until resulted   Bladder scan to r/o retention  Acidosis improving as well, serum CO2 normalized   Patient appears to continue improving - no invasive surgical intervention currently indicated or warranted  Continue current care per primary team, surgical team to sign off - please reconsult as needed  Will d/w Dr. He

## 2023-09-27 NOTE — DIETITIAN INITIAL EVALUATION ADULT - PERTINENT LABORATORY DATA
09-27    141  |  102  |  20  ----------------------------<  109<H>  3.2<L>   |  29  |  1.30    Ca    8.4<L>      27 Sep 2023 05:25  Phos  3.2     09-27  Mg     2.2     09-27    TPro  5.5<L>  /  Alb  2.7<L>  /  TBili  0.5  /  DBili  x   /  AST  12<L>  /  ALT  17  /  AlkPhos  109  09-25  POCT Blood Glucose.: 111 mg/dL (09-27-23 @ 11:37)  A1C with Estimated Average Glucose Result: 5.6 % (08-14-23 @ 05:22)  A1C with Estimated Average Glucose Result: 5.5 % (08-12-23 @ 21:50)  A1C with Estimated Average Glucose Result: 5.5 % (06-12-23 @ 05:51)

## 2023-09-27 NOTE — CARE COORDINATION ASSESSMENT. - PATIENT WAS INVOLVED WITH A HOMECARE AGENCY PRIOR TO ADMISSION?
The patient receives home care services from [facility] (M-F 10hrs and Sat - Sun 8 hrs)./Yes

## 2023-09-28 LAB
ANION GAP SERPL CALC-SCNC: 6 MMOL/L — SIGNIFICANT CHANGE UP (ref 5–17)
BASOPHILS # BLD AUTO: 0.03 K/UL — SIGNIFICANT CHANGE UP (ref 0–0.2)
BASOPHILS NFR BLD AUTO: 0.4 % — SIGNIFICANT CHANGE UP (ref 0–2)
BUN SERPL-MCNC: 16 MG/DL — SIGNIFICANT CHANGE UP (ref 7–23)
CALCIUM SERPL-MCNC: 8.3 MG/DL — LOW (ref 8.5–10.1)
CHLORIDE SERPL-SCNC: 104 MMOL/L — SIGNIFICANT CHANGE UP (ref 96–108)
CO2 SERPL-SCNC: 33 MMOL/L — HIGH (ref 22–31)
CREAT SERPL-MCNC: 1.2 MG/DL — SIGNIFICANT CHANGE UP (ref 0.5–1.3)
EGFR: 65 ML/MIN/1.73M2 — SIGNIFICANT CHANGE UP
EOSINOPHIL # BLD AUTO: 0.05 K/UL — SIGNIFICANT CHANGE UP (ref 0–0.5)
EOSINOPHIL NFR BLD AUTO: 0.7 % — SIGNIFICANT CHANGE UP (ref 0–6)
GLUCOSE SERPL-MCNC: 128 MG/DL — HIGH (ref 70–99)
HCT VFR BLD CALC: 25.2 % — LOW (ref 39–50)
HGB BLD-MCNC: 8.4 G/DL — LOW (ref 13–17)
IMM GRANULOCYTES NFR BLD AUTO: 0.4 % — SIGNIFICANT CHANGE UP (ref 0–0.9)
KAPPA LC SER QL IFE: 1.85 MG/DL — SIGNIFICANT CHANGE UP (ref 0.33–1.94)
KAPPA/LAMBDA FREE LIGHT CHAIN RATIO, SERUM: 1.39 RATIO — SIGNIFICANT CHANGE UP (ref 0.26–1.65)
LACTATE SERPL-SCNC: 0.8 MMOL/L — SIGNIFICANT CHANGE UP (ref 0.7–2)
LAMBDA LC SER QL IFE: 1.33 MG/DL — SIGNIFICANT CHANGE UP (ref 0.57–2.63)
LYMPHOCYTES # BLD AUTO: 0.83 K/UL — LOW (ref 1–3.3)
LYMPHOCYTES # BLD AUTO: 11.7 % — LOW (ref 13–44)
MAGNESIUM SERPL-MCNC: 2.1 MG/DL — SIGNIFICANT CHANGE UP (ref 1.6–2.6)
MCHC RBC-ENTMCNC: 26 PG — LOW (ref 27–34)
MCHC RBC-ENTMCNC: 33.3 GM/DL — SIGNIFICANT CHANGE UP (ref 32–36)
MCV RBC AUTO: 78 FL — LOW (ref 80–100)
MONOCYTES # BLD AUTO: 0.42 K/UL — SIGNIFICANT CHANGE UP (ref 0–0.9)
MONOCYTES NFR BLD AUTO: 5.9 % — SIGNIFICANT CHANGE UP (ref 2–14)
NEUTROPHILS # BLD AUTO: 5.75 K/UL — SIGNIFICANT CHANGE UP (ref 1.8–7.4)
NEUTROPHILS NFR BLD AUTO: 80.9 % — HIGH (ref 43–77)
NRBC # BLD: 0 /100 WBCS — SIGNIFICANT CHANGE UP (ref 0–0)
PHOSPHATE SERPL-MCNC: 2.8 MG/DL — SIGNIFICANT CHANGE UP (ref 2.5–4.5)
PLATELET # BLD AUTO: 184 K/UL — SIGNIFICANT CHANGE UP (ref 150–400)
POTASSIUM SERPL-MCNC: 3.5 MMOL/L — SIGNIFICANT CHANGE UP (ref 3.5–5.3)
POTASSIUM SERPL-SCNC: 3.5 MMOL/L — SIGNIFICANT CHANGE UP (ref 3.5–5.3)
PROT SERPL-MCNC: 4.9 G/DL — LOW (ref 6–8.3)
PROT SERPL-MCNC: 4.9 G/DL — LOW (ref 6–8.3)
RBC # BLD: 3.23 M/UL — LOW (ref 4.2–5.8)
RBC # FLD: 20.2 % — HIGH (ref 10.3–14.5)
SODIUM SERPL-SCNC: 143 MMOL/L — SIGNIFICANT CHANGE UP (ref 135–145)
WBC # BLD: 7.11 K/UL — SIGNIFICANT CHANGE UP (ref 3.8–10.5)
WBC # FLD AUTO: 7.11 K/UL — SIGNIFICANT CHANGE UP (ref 3.8–10.5)

## 2023-09-28 PROCEDURE — 99291 CRITICAL CARE FIRST HOUR: CPT

## 2023-09-28 PROCEDURE — 71045 X-RAY EXAM CHEST 1 VIEW: CPT | Mod: 26

## 2023-09-28 PROCEDURE — 99233 SBSQ HOSP IP/OBS HIGH 50: CPT | Mod: GC

## 2023-09-28 RX ORDER — MODAFINIL 200 MG/1
100 TABLET ORAL DAILY
Refills: 0 | Status: DISCONTINUED | OUTPATIENT
Start: 2023-09-28 | End: 2023-09-30

## 2023-09-28 RX ORDER — DILTIAZEM HCL 120 MG
60 CAPSULE, EXT RELEASE 24 HR ORAL EVERY 6 HOURS
Refills: 0 | Status: DISCONTINUED | OUTPATIENT
Start: 2023-09-28 | End: 2023-09-30

## 2023-09-28 RX ORDER — PANTOPRAZOLE SODIUM 20 MG/1
40 TABLET, DELAYED RELEASE ORAL
Refills: 0 | Status: DISCONTINUED | OUTPATIENT
Start: 2023-09-28 | End: 2023-09-30

## 2023-09-28 RX ORDER — POTASSIUM CHLORIDE 20 MEQ
40 PACKET (EA) ORAL ONCE
Refills: 0 | Status: COMPLETED | OUTPATIENT
Start: 2023-09-28 | End: 2023-09-28

## 2023-09-28 RX ORDER — SOD,AMMONIUM,POTASSIUM LACTATE
1 CREAM (GRAM) TOPICAL
Refills: 0 | Status: DISCONTINUED | OUTPATIENT
Start: 2023-09-28 | End: 2023-09-30

## 2023-09-28 RX ADMIN — Medication 60 MILLIGRAM(S): at 12:35

## 2023-09-28 RX ADMIN — PIPERACILLIN AND TAZOBACTAM 25 GRAM(S): 4; .5 INJECTION, POWDER, LYOPHILIZED, FOR SOLUTION INTRAVENOUS at 21:03

## 2023-09-28 RX ADMIN — LATANOPROST 1 DROP(S): 0.05 SOLUTION/ DROPS OPHTHALMIC; TOPICAL at 22:55

## 2023-09-28 RX ADMIN — Medication 40 MILLIEQUIVALENT(S): at 09:35

## 2023-09-28 RX ADMIN — MUPIROCIN 1 APPLICATION(S): 20 OINTMENT TOPICAL at 05:21

## 2023-09-28 RX ADMIN — APIXABAN 5 MILLIGRAM(S): 2.5 TABLET, FILM COATED ORAL at 17:53

## 2023-09-28 RX ADMIN — APIXABAN 5 MILLIGRAM(S): 2.5 TABLET, FILM COATED ORAL at 05:08

## 2023-09-28 RX ADMIN — Medication 60 MILLIGRAM(S): at 05:08

## 2023-09-28 RX ADMIN — PIPERACILLIN AND TAZOBACTAM 25 GRAM(S): 4; .5 INJECTION, POWDER, LYOPHILIZED, FOR SOLUTION INTRAVENOUS at 05:07

## 2023-09-28 RX ADMIN — PIPERACILLIN AND TAZOBACTAM 25 GRAM(S): 4; .5 INJECTION, POWDER, LYOPHILIZED, FOR SOLUTION INTRAVENOUS at 15:27

## 2023-09-28 RX ADMIN — MODAFINIL 100 MILLIGRAM(S): 200 TABLET ORAL at 10:26

## 2023-09-28 RX ADMIN — Medication 60 MILLIGRAM(S): at 17:53

## 2023-09-28 RX ADMIN — Medication 1 APPLICATION(S): at 17:53

## 2023-09-28 RX ADMIN — CHLORHEXIDINE GLUCONATE 1 APPLICATION(S): 213 SOLUTION TOPICAL at 05:21

## 2023-09-28 RX ADMIN — Medication 60 MILLIGRAM(S): at 23:00

## 2023-09-28 RX ADMIN — MUPIROCIN 1 APPLICATION(S): 20 OINTMENT TOPICAL at 17:53

## 2023-09-28 NOTE — CONSULT NOTE ADULT - SUBJECTIVE AND OBJECTIVE BOX
[facility]  Ovidio Manning MD PhD, Jojo Boggs MD, Hafsa Bhardwaj MD, Danica Goldsmith MD, Samy Terry MD  and providing coverage with Meet Perea MD  Providing Infectious Disease Consultations at [facility], [facility], [facility]'s    Office# [phone] to schedule follow up appointments  Answering Service for urgent calls or New Consults [phone]  Cell# to text for urgent issues Ovidio Manning [phone]     HPI:  74 y/o M with PMHx T2DM on metformin with peripheral neuropathy, HTN, HLD A-fib (on Eliquis), TBI (s/p SDH with EVAC on 9/2021), and traumatic subdural hemorrhage in 10/2022 Covid Pna, and major depression presented to the ED with c/o vomiting and diarrhea. Pt started vomiting non stop with liquid BM for at least 1 1/2 hrs. Pt is at home, he has home aide 10/hr per day, bedbound at baseline. In ER, pt found to be in sepsis with lactate of 14 and in rapid Afib. Initially admitted to ICU with concern for sepsis with asp PNA vs colitis in differential.        PAST MEDICAL & SURGICAL HISTORY:  TBI (traumatic brain injury)  HTN (hypertension)  Atrial fibrillation  Peripheral neuropathy  Major depression  HLD (hyperlipidemia)  CAD (coronary artery disease)  BPH (benign prostatic hyperplasia)  Pneumonia due to COVID-19 virus - June 2022  Hemorrhage in the brain      H/O craniotomy          Antimicrobials  piperacillin/tazobactam IVPB.. 3.375 Gram(s) IV Intermittent every 8 hours      Immunological  influenza  Vaccine (HIGH DOSE) 0.7 milliLiter(s) IntraMuscular once      Other  ammonium lactate 12% Lotion 1 Application(s) Topical two times a day  apixaban 5 milliGRAM(s) Oral every 12 hours  chlorhexidine 2% Cloths 1 Application(s) Topical <User Schedule>  diltiazem    Tablet 60 milliGRAM(s) Oral every 6 hours  latanoprost 0.005% Ophthalmic Solution 1 Drop(s) Both EYES at bedtime  modafinil 100 milliGRAM(s) Oral daily  mupirocin 2% Nasal 1 Application(s) Both Nostrils two times a day  pantoprazole    Tablet 40 milliGRAM(s) Oral before breakfast      Allergies    No Known Allergies    Intolerances        SOCIAL HISTORY:  Denies alcohol, tobacco, recreational drug use  Lives: at home with wife (primary care giver w/ HHA)  Ambulation: bedbound, occasional wheelchair  Need help with feeding and ADLs (25 Sep 2023 19:19)      FAMILY HISTORY:  FH: HTN (hypertension) (Father, Mother, Sibling  Family history of bone cancer (Sibling)  FH: Alzheimers disease (Sibling)        ROS:  limited due to cognitive status    Vital Signs Last 24 Hrs  T(C): 36.6 (28 Sep 2023 11:04), Max: 37.5 (28 Sep 2023 08:05)  T(F): 97.8 (28 Sep 2023 11:04), Max: 99.5 (28 Sep 2023 08:05)  HR: 90 (28 Sep 2023 12:31) (86 - 107)  BP: 149/90 (28 Sep 2023 12:31) (118/82 - 169/88)  BP(mean): 116 (28 Sep 2023 10:00) (95 - 125)  RR: 16 (28 Sep 2023 11:04) (11 - 16)  SpO2: 98% (28 Sep 2023 11:04) (91% - 100%)    Parameters below as of 28 Sep 2023 07:00  Patient On (Oxygen Delivery Method): nasal cannula  O2 Flow (L/min): 3      PE:  In no distress  HEENT:  NC, PERRL, sclerae anicteric, conjunctivae clear, EOMI.  Sinuses nontender, no nasal exudate.  No buccal or pharyngeal lesions, erythema or exudate  Neck:  Supple, no adenopathy  Lungs:  No accessory muscle use, bilaterally clear to auscultation  Cor:  distant  Abd:  Symmetric, normoactive BS.  Soft, nontender, no masses, guarding or rebound.  Liver and spleen not enlarged  Extrem:  No cyanosis or edema  Skin:  No rashes.  Neuro: grossly intact  Musc: moving all limbs freely, no focal deficits        LABS:                        8.4    7.11  )-----------( 184      ( 28 Sep 2023 05:35 )             25.2       WBC Count: 7.11 K/uL (09-28-23 @ 05:35)  WBC Count: 6.17 K/uL (09-27-23 @ 20:28)  WBC Count: 6.34 K/uL (09-27-23 @ 05:25)  WBC Count: 23.19 K/uL (09-26-23 @ 04:37)  WBC Count: 21.65 K/uL (09-26-23 @ 04:00)  WBC Count: 29.61 K/uL (09-25-23 @ 14:14)      09-28    143  |  104  |  16  ----------------------------<  128<H>  3.5   |  33<H>  |  1.20    Ca    8.3<L>      28 Sep 2023 05:35  Phos  2.8     09-28  Mg     2.1     09-28    TPro  4.9<L>  /  Alb  x   /  TBili  x   /  DBili  x   /  AST  x   /  ALT  x   /  AlkPhos  x   09-28      Creatinine: 1.20 mg/dL (09-28-23 @ 05:35)  Creatinine: 1.30 mg/dL (09-27-23 @ 05:25)  Creatinine: 1.40 mg/dL (09-26-23 @ 04:37)  Creatinine: 1.20 mg/dL (09-25-23 @ 22:20)  Creatinine: 1.40 mg/dL (09-25-23 @ 14:14)      Urinalysis Basic - ( 28 Sep 2023 05:35 )    Color: x / Appearance: x / SG: x / pH: x  Gluc: 128 mg/dL / Ketone: x  / Bili: x / Urobili: x   Blood: x / Protein: x / Nitrite: x   Leuk Esterase: x / RBC: x / WBC x   Sq Epi: x / Non Sq Epi: x / Bacteria: x              MICROBIOLOGY:      RADIOLOGY & ADDITIONAL STUDIES:    --< from: CT Chest w/ IV Cont (09.25.23 @ 19:55) >    ACC: [id number] EXAM:  CT ABDOMEN AND PELVIS IC   ORDERED BY: RUSSELL NGUYEN     ACC: [id number] EXAM:  CT CHEST IC   ORDERED BY: RUSSELL NGUYEN     PROCEDURE DATE:  09/25/2023          INTERPRETATION:  CLINICAL INFORMATION: sepsis Admitting Dxs: I48.91   UNSPECIFIED ATRIAL FIBRILLATION PCT    COMPARISON: 8/12/2023.    CONTRAST/COMPLICATIONS:  IV Contrast: Omnipaque 350 (accession [id number]), IV contrast documented   in unlinked concurrent exam (accession [id number])  90 cc administered   10   cc discarded  Oral Contrast: NONE  Complications: None reported at time of study completion    PROCEDURE:  CT of the Chest, Abdomen and Pelvis was performed.  Sagittal and coronal reformats were performed.    FINDINGS:  CHEST:  LUNGS AND LARGE AIRWAYS: Retained secretions in the central airways.   Bibasilar airspace opacities.  PLEURA: Small pleural effusions.  VESSELS: Within normal limits.  HEART: Heart size is normal.  Trace pericardial effusion.  MEDIASTINUM AND ALYSSA: No lymphadenopathy.  CHEST WALL AND LOWER NECK: Within normal limits.    ABDOMEN AND PELVIS:  LIVER: Within normal limits.  BILE DUCTS: Normal caliber.  GALLBLADDER: Within normal limits.  SPLEEN: Multiple hypodense lesions.  PANCREAS: Within normal limits.  ADRENALS: Within normal limits.  KIDNEYS/URETERS: Cysts and other lesions too small to characterize.    BLADDER: Within normal limits.  REPRODUCTIVE ORGANS: The prostate is enlarged.    BOWEL: No bowel obstruction. There is moderate thickening of the   rectosigmoid colon consistent with colitis.  PERITONEUM: No ascites.  VESSELS:  Within normal limits.  RETROPERITONEUM/LYMPH NODES: No lymphadenopathy.  ABDOMINAL WALL: Small inguinal hernias.  BONES: Within normal limits.    IMPRESSION: Bibasilar airspace opacities suspicious for infection.    Rectosigmoid colitis.

## 2023-09-28 NOTE — PROGRESS NOTE ADULT - PROBLEM SELECTOR PLAN 1
- severe sepsis (POA) due to suspected aspiration PNA and rectosigmoid colitis   - very high lactate, leukocytosis, tachycardia on admission  - CT chest/abd/P shows: Bibasilar airspace opacities suspicious for infection.  Rectosigmoid colitis.  -  Diabetic on Metformin. Lactate of 14 on admission.  Suspect metformin-associated lactic acidosis contributing to the very high lactate especially in setting of LATOYA; rec against pt taking metformin in the future.   - Lactate 0.8 today  - Leukocytosis now resolved  - GI PCR neg.  - f/u repeat CXR  - BCx x2 NG @ 48h, UCx NG final  - MRSA/MSSA nares positive, Bactroban for decolonization  - currently on zosyn covering colitis and most aspiration PNAs  - ID Dr. Manning consulted, will f/u recs - severe sepsis (POA) due to suspected aspiration PNA and rectosigmoid colitis   - very high lactate, leukocytosis, tachycardia on admission  - CT chest/abd/P shows: Bibasilar airspace opacities suspicious for infection.  Rectosigmoid colitis.  - Diabetic on Metformin. Lactate of 14 on admission. Suspect metformin-associated lactic acidosis contributing to the very high lactate especially in setting of LATOYA; rec against pt taking metformin in the future.   - Lactate 0.8 today  - Leukocytosis now resolved  - GI PCR neg.  - f/u repeat CXR  - BCx x2 NG @ 48h, UCx NG final  - MRSA/MSSA nares positive, Bactroban for decolonization  - currently on zosyn covering colitis and most aspiration PNAs  - has not been on systemic MRSA coverage and improving clinically arguing against an MRSA PNA  - ID Dr. aMnning consulted, will f/u recs

## 2023-09-28 NOTE — PROGRESS NOTE ADULT - CRITICAL CARE ATTENDING COMMENT
Upon my evaluation, this patient is at high risk for imminent or life threatening deterioration due to afib with RVR, septic shock and other active medical issues which require my direct attention, intervention, and personal management.  I have personally spent >30 minutes  of critical care time exclusive of time spent on separate billing procedures. This includes review of laboratory data, radiology results, discussion with primary team\patient, and monitoring for potential decompensation. Interventions were performed as documented above.
Upon my evaluation, this patient is at high risk for imminent or life threatening deterioration due to afib with RVR, septic shock and other active medical issues which require my direct attention, intervention, and personal management.  I have personally spent >30 minutes  of critical care time exclusive of time spent on separate billing procedures. This includes review of laboratory data, radiology results, discussion with primary team\patient, and monitoring for potential decompensation. Interventions were performed as documented above.

## 2023-09-28 NOTE — PROGRESS NOTE ADULT - SUBJECTIVE AND OBJECTIVE BOX
[facility] -- Janak Edwards, Alexey العراقي Pannella, Patel, Savella  Office # [phone]      Follow Up:  AF PNA    Subjective/Observations: Patient seen and examined. Events noted. Resting  in bed.  Awake nonverbal. Unable to provide meaningful information.     REVIEW OF SYSTEMS: Limited 2/2 comorbidities     PAST MEDICAL & SURGICAL HISTORY:  TBI (traumatic brain injury)      HTN (hypertension)      Atrial fibrillation      Peripheral neuropathy      Major depression      HLD (hyperlipidemia)      CAD (coronary artery disease)      BPH (benign prostatic hyperplasia)      Pneumonia due to COVID-19 virus  June 2022      Hemorrhage in the brain      H/O craniotomy          MEDICATIONS  (STANDING):  apixaban 5 milliGRAM(s) Oral every 12 hours  chlorhexidine 2% Cloths 1 Application(s) Topical <User Schedule>  diltiazem    Tablet 60 milliGRAM(s) Oral every 6 hours  influenza  Vaccine (HIGH DOSE) 0.7 milliLiter(s) IntraMuscular once  latanoprost 0.005% Ophthalmic Solution 1 Drop(s) Both EYES at bedtime  modafinil 100 milliGRAM(s) Oral daily  mupirocin 2% Nasal 1 Application(s) Both Nostrils two times a day  pantoprazole    Tablet 40 milliGRAM(s) Oral before breakfast  piperacillin/tazobactam IVPB.. 3.375 Gram(s) IV Intermittent every 8 hours    MEDICATIONS  (PRN):      Allergies    No Known Allergies    Intolerances            Vital Signs Last 24 Hrs  T(C): 36.6 (28 Sep 2023 11:04), Max: 37.5 (28 Sep 2023 08:05)  T(F): 97.8 (28 Sep 2023 11:04), Max: 99.5 (28 Sep 2023 08:05)  HR: 98 (28 Sep 2023 11:04) (86 - 109)  BP: 118/82 (28 Sep 2023 11:04) (118/82 - 169/88)  BP(mean): 116 (28 Sep 2023 10:00) (95 - 125)  RR: 16 (28 Sep 2023 11:04) (11 - 16)  SpO2: 98% (28 Sep 2023 11:04) (91% - 100%)    Parameters below as of 28 Sep 2023 07:00  Patient On (Oxygen Delivery Method): nasal cannula  O2 Flow (L/min): 3      I&O's Summary    27 Sep 2023 07:01  -  28 Sep 2023 07:00  --------------------------------------------------------  IN: 705 mL / OUT: 1940 mL / NET: -1235 mL    28 Sep 2023 07:01  -  28 Sep 2023 11:30  --------------------------------------------------------  IN: 120 mL / OUT: 400 mL / NET: -280 mL          PHYSICAL EXAM:  TELE: AF   Constitutional: NAD, awake    HEENT: Moist Mucous Membranes, Anicteric  Pulmonary: Decreased breath sounds b/l. No rales, crackles or wheeze appreciated.   Cardiovascular: IRRR, S1 and S2, No murmurs, rubs, gallops or clicks  Gastrointestinal: Bowel Sounds present, soft, nontender.   Lymph: No peripheral edema. No lymphadenopathy.  Skin: No visible rashes or ulcers.  Psych:  flat affect. unable to fully assess    LABS: All Labs Reviewed:                        8.4    7.11  )-----------( 184      ( 28 Sep 2023 05:35 )             25.2                         8.9    6.17  )-----------( 212      ( 27 Sep 2023 20:28 )             26.8                         9.4    6.34  )-----------( 174      ( 27 Sep 2023 05:25 )             27.9     28 Sep 2023 05:35    143    |  104    |  16     ----------------------------<  128    3.5     |  33     |  1.20   27 Sep 2023 05:25    141    |  102    |  20     ----------------------------<  109    3.2     |  29     |  1.30   26 Sep 2023 04:37    139    |  105    |  25     ----------------------------<  139    4.1     |  20     |  1.40     Ca    8.3        28 Sep 2023 05:35  Ca    8.4        27 Sep 2023 05:25  Ca    7.9        26 Sep 2023 04:37  Phos  2.8       28 Sep 2023 05:35  Phos  3.2       27 Sep 2023 05:25  Phos  4.3       26 Sep 2023 04:37  Mg     2.1       28 Sep 2023 05:35  Mg     2.2       27 Sep 2023 05:25  Mg     1.6       26 Sep 2023 04:37    TPro  4.9    /  Alb  x      /  TBili  x      /  DBili  x      /  AST  x      /  ALT  x      /  AlkPhos  x      28 Sep 2023 05:35  TPro  5.5    /  Alb  2.7    /  TBili  0.5    /  DBili  x      /  AST  12     /  ALT  17     /  AlkPhos  109    25 Sep 2023 22:20  TPro  7.7    /  Alb  3.7    /  TBili  0.7    /  DBili  x      /  AST  26     /  ALT  23     /  AlkPhos  173    25 Sep 2023 14:14    PT/INR - ( 27 Sep 2023 05:25 )   PT: 12.5 sec;   INR: 1.07 ratio         PTT - ( 27 Sep 2023 20:28 )  PTT:34.7 sec    - Troponin:

## 2023-09-28 NOTE — PROGRESS NOTE ADULT - SUBJECTIVE AND OBJECTIVE BOX
INTERVAL HPI/OVERNIGHT EVENTS:    SUBJECTIVE: Patient seen and examined at bedside.     ROS:  CV: Denies chest pain  Resp: Denies SOB  GI: Denies abdominal pain, constipation, diarrhea, nausea, vomiting  : Denies dysuria  ID: Denies fevers, chills  MSK: Denies joint pain     OBJECTIVE:    VITAL SIGNS:  ICU Vital Signs Last 24 Hrs  T(C): 37.4 (28 Sep 2023 04:07), Max: 37.4 (28 Sep 2023 04:07)  T(F): 99.3 (28 Sep 2023 04:07), Max: 99.3 (28 Sep 2023 04:07)  HR: 100 (28 Sep 2023 06:00) (81 - 109)  BP: 143/83 (28 Sep 2023 06:00) (123/76 - 169/88)  BP(mean): 105 (28 Sep 2023 06:00) (95 - 121)  ABP: --  ABP(mean): --  RR: 16 (28 Sep 2023 06:00) (11 - 21)  SpO2: 99% (28 Sep 2023 06:00) (91% - 100%)    O2 Parameters below as of 28 Sep 2023 05:00  Patient On (Oxygen Delivery Method): nasal cannula  O2 Flow (L/min): 3            09-27 @ 07:01  -  09-28 @ 07:00  --------------------------------------------------------  IN: 705 mL / OUT: 1940 mL / NET: -1235 mL      CAPILLARY BLOOD GLUCOSE      POCT Blood Glucose.: 137 mg/dL (28 Sep 2023 05:47)      PHYSICAL EXAM:  General: NAD, comfortable  HEENT: NCAT, clear conjunctiva, no scleral icterus  Respiratory: CTA b/l, no wheezing, rhonchi, rales  Cardiovascular: RRR, normal S1S2, no M/R/G  Abdomen: soft, NT/ND, bowel sounds present  Extremities: no LE edema or calf tenderness  Neurology: awake and alert    MEDICATIONS:  MEDICATIONS  (STANDING):  apixaban 5 milliGRAM(s) Oral every 12 hours  chlorhexidine 2% Cloths 1 Application(s) Topical <User Schedule>  diltiazem    Tablet 60 milliGRAM(s) Oral every 8 hours  heparin  Infusion. 600 Unit(s)/Hr (6 mL/Hr) IV Continuous <Continuous>  influenza  Vaccine (HIGH DOSE) 0.7 milliLiter(s) IntraMuscular once  latanoprost 0.005% Ophthalmic Solution 1 Drop(s) Both EYES at bedtime  mupirocin 2% Nasal 1 Application(s) Both Nostrils two times a day  pantoprazole  Injectable 40 milliGRAM(s) IV Push daily  piperacillin/tazobactam IVPB.. 3.375 Gram(s) IV Intermittent every 8 hours    MEDICATIONS  (PRN):      ALLERGIES:  Allergies    No Known Allergies    Intolerances        LABS:                        8.4    7.11  )-----------( 184      ( 28 Sep 2023 05:35 )             25.2     09-28    143  |  104  |  16  ----------------------------<  128<H>  3.5   |  33<H>  |  1.20    Ca    8.3<L>      28 Sep 2023 05:35  Phos  2.8     09-28  Mg     2.1     09-28      PT/INR - ( 27 Sep 2023 05:25 )   PT: 12.5 sec;   INR: 1.07 ratio         PTT - ( 27 Sep 2023 20:28 )  PTT:34.7 sec  Urinalysis Basic - ( 28 Sep 2023 05:35 )    Color: x / Appearance: x / SG: x / pH: x  Gluc: 128 mg/dL / Ketone: x  / Bili: x / Urobili: x   Blood: x / Protein: x / Nitrite: x   Leuk Esterase: x / RBC: x / WBC x   Sq Epi: x / Non Sq Epi: x / Bacteria: x        RADIOLOGY & ADDITIONAL TESTS: Reviewed. ***  BEDSIDE LUNG ULTRASOUND: ***  BEDSIDE ECHO: ***    CENTRAL LINE: N        DATE INSERTED:             REMOVE: N  [place]: Y                       DATE INSERTED:              REMOVE: Y/N  A-LINE: N                       DATE INSERTED:              REMOVE: Y/N      GLOBAL ISSUE/BEST PRACTICE  Analgesia: Y  Sedation: Y  HOB elevation: yes  Stress ulcer prophylaxis: Y  VTE prophylaxis: Y  Glycemic control: Y  Nutrition: Y    CODE STATUS: Full Code INTERVAL HPI/OVERNIGHT EVENTS: no overnight events, pt had low grade fever 37.4C at 4:07AM.    SUBJECTIVE: Patient seen and examined at bedside.     ROS:  CV: Denies chest pain  Resp: Denies SOB  GI: Denies abdominal pain, constipation, diarrhea, nausea, vomiting  : Denies dysuria  ID: Denies fevers, chills  MSK: Denies joint pain     OBJECTIVE:    VITAL SIGNS:  ICU Vital Signs Last 24 Hrs  T(C): 37.4 (28 Sep 2023 04:07), Max: 37.4 (28 Sep 2023 04:07)  T(F): 99.3 (28 Sep 2023 04:07), Max: 99.3 (28 Sep 2023 04:07)  HR: 100 (28 Sep 2023 06:00) (81 - 109)  BP: 143/83 (28 Sep 2023 06:00) (123/76 - 169/88)  BP(mean): 105 (28 Sep 2023 06:00) (95 - 121)  ABP: --  ABP(mean): --  RR: 16 (28 Sep 2023 06:00) (11 - 21)  SpO2: 99% (28 Sep 2023 06:00) (91% - 100%)    O2 Parameters below as of 28 Sep 2023 05:00  Patient On (Oxygen Delivery Method): nasal cannula  O2 Flow (L/min): 3            09-27 @ 07:01  -  09-28 @ 07:00  --------------------------------------------------------  IN: 705 mL / OUT: 1940 mL / NET: -1235 mL      CAPILLARY BLOOD GLUCOSE      POCT Blood Glucose.: 137 mg/dL (28 Sep 2023 05:47)      PHYSICAL EXAM:  General: NAD, comfortable  HEENT: NCAT, clear conjunctiva, no scleral icterus  Respiratory: CTA b/l, no wheezing, rhonchi, rales  Cardiovascular: RRR, normal S1S2, no M/R/G  Abdomen: soft, NT/ND, bowel sounds present  Extremities: no LE edema or calf tenderness  Neurology: awake and alert    MEDICATIONS:  MEDICATIONS  (STANDING):  apixaban 5 milliGRAM(s) Oral every 12 hours  chlorhexidine 2% Cloths 1 Application(s) Topical <User Schedule>  diltiazem    Tablet 60 milliGRAM(s) Oral every 8 hours  heparin  Infusion. 600 Unit(s)/Hr (6 mL/Hr) IV Continuous <Continuous>  influenza  Vaccine (HIGH DOSE) 0.7 milliLiter(s) IntraMuscular once  latanoprost 0.005% Ophthalmic Solution 1 Drop(s) Both EYES at bedtime  mupirocin 2% Nasal 1 Application(s) Both Nostrils two times a day  pantoprazole  Injectable 40 milliGRAM(s) IV Push daily  piperacillin/tazobactam IVPB.. 3.375 Gram(s) IV Intermittent every 8 hours    MEDICATIONS  (PRN):      ALLERGIES:  Allergies    No Known Allergies    Intolerances        LABS:                        8.4    7.11  )-----------( 184      ( 28 Sep 2023 05:35 )             25.2     09-28    143  |  104  |  16  ----------------------------<  128<H>  3.5   |  33<H>  |  1.20    Ca    8.3<L>      28 Sep 2023 05:35  Phos  2.8     09-28  Mg     2.1     09-28      PT/INR - ( 27 Sep 2023 05:25 )   PT: 12.5 sec;   INR: 1.07 ratio         PTT - ( 27 Sep 2023 20:28 )  PTT:34.7 sec  Urinalysis Basic - ( 28 Sep 2023 05:35 )    Color: x / Appearance: x / SG: x / pH: x  Gluc: 128 mg/dL / Ketone: x  / Bili: x / Urobili: x   Blood: x / Protein: x / Nitrite: x   Leuk Esterase: x / RBC: x / WBC x   Sq Epi: x / Non Sq Epi: x / Bacteria: x        RADIOLOGY & ADDITIONAL TESTS: Reviewed. ***  BEDSIDE LUNG ULTRASOUND: ***  BEDSIDE ECHO: ***    CENTRAL LINE: N        DATE INSERTED:             REMOVE: N  [place]: Y                       DATE INSERTED:              REMOVE: Y/N  A-LINE: N                       DATE INSERTED:              REMOVE: Y/N      GLOBAL ISSUE/BEST PRACTICE  Analgesia: Y  Sedation: Y  HOB elevation: yes  Stress ulcer prophylaxis: Y  VTE prophylaxis: Y  Glycemic control: Y  Nutrition: Y    CODE STATUS: Full Code INTERVAL HPI/OVERNIGHT EVENTS: no overnight events, pt had low grade fever 37.4C at 4:07AM.    SUBJECTIVE: Patient seen and examined at bedside.     ROS:  CV: Denies chest pain  Resp: Denies SOB  GI: Denies abdominal pain, constipation, diarrhea, nausea, vomiting  : Denies dysuria  ID: Denies fevers, chills  MSK: Denies joint pain     OBJECTIVE:    VITAL SIGNS:  ICU Vital Signs Last 24 Hrs  T(C): 37.4 (28 Sep 2023 04:07), Max: 37.4 (28 Sep 2023 04:07)  T(F): 99.3 (28 Sep 2023 04:07), Max: 99.3 (28 Sep 2023 04:07)  HR: 100 (28 Sep 2023 06:00) (81 - 109)  BP: 143/83 (28 Sep 2023 06:00) (123/76 - 169/88)  BP(mean): 105 (28 Sep 2023 06:00) (95 - 121)  ABP: --  ABP(mean): --  RR: 16 (28 Sep 2023 06:00) (11 - 21)  SpO2: 99% (28 Sep 2023 06:00) (91% - 100%)    O2 Parameters below as of 28 Sep 2023 05:00  Patient On (Oxygen Delivery Method): nasal cannula  O2 Flow (L/min): 3            09-27 @ 07:01  -  09-28 @ 07:00  --------------------------------------------------------  IN: 705 mL / OUT: 1940 mL / NET: -1235 mL      CAPILLARY BLOOD GLUCOSE      POCT Blood Glucose.: 137 mg/dL (28 Sep 2023 05:47)      PHYSICAL EXAM:  General: chronically ill, bedbound  HEENT: NCAT  Respiratory: CTA b/l, no wheezing, rhonchi, rales  Cardiovascular: irregular rhythm, no M/R/G  Abdomen: soft, NT/ND  Extremities: +1 pitting edema on the dorsum of right foot. No calf tenderness, arm contracted.  Neurology:     MEDICATIONS:  MEDICATIONS  (STANDING):  apixaban 5 milliGRAM(s) Oral every 12 hours  chlorhexidine 2% Cloths 1 Application(s) Topical <User Schedule>  diltiazem    Tablet 60 milliGRAM(s) Oral every 8 hours  heparin  Infusion. 600 Unit(s)/Hr (6 mL/Hr) IV Continuous <Continuous>  influenza  Vaccine (HIGH DOSE) 0.7 milliLiter(s) IntraMuscular once  latanoprost 0.005% Ophthalmic Solution 1 Drop(s) Both EYES at bedtime  mupirocin 2% Nasal 1 Application(s) Both Nostrils two times a day  pantoprazole  Injectable 40 milliGRAM(s) IV Push daily  piperacillin/tazobactam IVPB.. 3.375 Gram(s) IV Intermittent every 8 hours    MEDICATIONS  (PRN):      ALLERGIES:  Allergies    No Known Allergies    Intolerances        LABS:                        8.4    7.11  )-----------( 184      ( 28 Sep 2023 05:35 )             25.2     09-28    143  |  104  |  16  ----------------------------<  128<H>  3.5   |  33<H>  |  1.20    Ca    8.3<L>      28 Sep 2023 05:35  Phos  2.8     09-28  Mg     2.1     09-28      PT/INR - ( 27 Sep 2023 05:25 )   PT: 12.5 sec;   INR: 1.07 ratio         PTT - ( 27 Sep 2023 20:28 )  PTT:34.7 sec  Urinalysis Basic - ( 28 Sep 2023 05:35 )    Color: x / Appearance: x / SG: x / pH: x  Gluc: 128 mg/dL / Ketone: x  / Bili: x / Urobili: x   Blood: x / Protein: x / Nitrite: x   Leuk Esterase: x / RBC: x / WBC x   Sq Epi: x / Non Sq Epi: x / Bacteria: x            CENTRAL LINE: N        DATE INSERTED:             REMOVE: N  [place]: N                       DATE INSERTED:              REMOVE: Y/N  A-LINE: N                       DATE INSERTED:              REMOVE: Y/N      GLOBAL ISSUE/BEST PRACTICE  Analgesia: N  Sedation: N  HOB elevation: yes  Stress ulcer prophylaxis: Y  VTE prophylaxis: Y  Glycemic control: N  Nutrition: Y    CODE STATUS: Full Code INTERVAL HPI/OVERNIGHT EVENTS: no overnight events, pt had elevated temperature: 37.4C at 4:07AM.    SUBJECTIVE: Patient seen and examined at bedside. Pt comfortable, awake. Silent upon questioning.       OBJECTIVE:    VITAL SIGNS:  ICU Vital Signs Last 24 Hrs  T(C): 37.4 (28 Sep 2023 04:07), Max: 37.4 (28 Sep 2023 04:07)  T(F): 99.3 (28 Sep 2023 04:07), Max: 99.3 (28 Sep 2023 04:07)  HR: 100 (28 Sep 2023 06:00) (81 - 109)  BP: 143/83 (28 Sep 2023 06:00) (123/76 - 169/88)  BP(mean): 105 (28 Sep 2023 06:00) (95 - 121)  ABP: --  ABP(mean): --  RR: 16 (28 Sep 2023 06:00) (11 - 21)  SpO2: 99% (28 Sep 2023 06:00) (91% - 100%)    O2 Parameters below as of 28 Sep 2023 05:00  Patient On (Oxygen Delivery Method): nasal cannula  O2 Flow (L/min): 3            09-27 @ 07:01  -  09-28 @ 07:00  --------------------------------------------------------  IN: 705 mL / OUT: 1940 mL / NET: -1235 mL      CAPILLARY BLOOD GLUCOSE      POCT Blood Glucose.: 137 mg/dL (28 Sep 2023 05:47)      PHYSICAL EXAM:  General: chronically ill, bedbound. Pt awake, silent when questioned. On 3L NC  HEENT: NCAT  Respiratory: CTA b/l, no wheezing, rhonchi, rales  Cardiovascular: irregular rhythm, no M/R/G  Abdomen: soft, NT/ND, condom catheter in place.  Extremities: +1 pitting edema on the dorsum of right foot. No calf tenderness, arm contracted.  Neurology: pt awake, responds to voice. Remains silent when questioned.     MEDICATIONS:  MEDICATIONS  (STANDING):  apixaban 5 milliGRAM(s) Oral every 12 hours  chlorhexidine 2% Cloths 1 Application(s) Topical <User Schedule>  diltiazem    Tablet 60 milliGRAM(s) Oral every 8 hours  heparin  Infusion. 600 Unit(s)/Hr (6 mL/Hr) IV Continuous <Continuous>  influenza  Vaccine (HIGH DOSE) 0.7 milliLiter(s) IntraMuscular once  latanoprost 0.005% Ophthalmic Solution 1 Drop(s) Both EYES at bedtime  mupirocin 2% Nasal 1 Application(s) Both Nostrils two times a day  pantoprazole  Injectable 40 milliGRAM(s) IV Push daily  piperacillin/tazobactam IVPB.. 3.375 Gram(s) IV Intermittent every 8 hours    MEDICATIONS  (PRN):      ALLERGIES:  Allergies    No Known Allergies    Intolerances        LABS:                        8.4    7.11  )-----------( 184      ( 28 Sep 2023 05:35 )             25.2     09-28    143  |  104  |  16  ----------------------------<  128<H>  3.5   |  33<H>  |  1.20    Ca    8.3<L>      28 Sep 2023 05:35  Phos  2.8     09-28  Mg     2.1     09-28      PT/INR - ( 27 Sep 2023 05:25 )   PT: 12.5 sec;   INR: 1.07 ratio         PTT - ( 27 Sep 2023 20:28 )  PTT:34.7 sec  Urinalysis Basic - ( 28 Sep 2023 05:35 )    Color: x / Appearance: x / SG: x / pH: x  Gluc: 128 mg/dL / Ketone: x  / Bili: x / Urobili: x   Blood: x / Protein: x / Nitrite: x   Leuk Esterase: x / RBC: x / WBC x   Sq Epi: x / Non Sq Epi: x / Bacteria: x            CENTRAL LINE: N        DATE INSERTED:             REMOVE: N  [place]: N                       DATE INSERTED:              REMOVE: Y/N  A-LINE: N                       DATE INSERTED:              REMOVE: Y/N      GLOBAL ISSUE/BEST PRACTICE  Analgesia: N  Sedation: N  HOB elevation: yes  Stress ulcer prophylaxis: Y  VTE prophylaxis: Y  Glycemic control: N  Nutrition: Y    CODE STATUS: Full Code INTERVAL HPI/OVERNIGHT EVENTS: no overnight events, pt had elevated temperature: 37.4C at 4:07AM.    SUBJECTIVE: Patient seen and examined at bedside. Pt comfortable, awake. Silent upon questioning.       OBJECTIVE:    VITAL SIGNS:  ICU Vital Signs Last 24 Hrs  T(C): 37.4 (28 Sep 2023 04:07), Max: 37.4 (28 Sep 2023 04:07)  T(F): 99.3 (28 Sep 2023 04:07), Max: 99.3 (28 Sep 2023 04:07)  HR: 100 (28 Sep 2023 06:00) (81 - 109)  BP: 143/83 (28 Sep 2023 06:00) (123/76 - 169/88)  BP(mean): 105 (28 Sep 2023 06:00) (95 - 121)  ABP: --  ABP(mean): --  RR: 16 (28 Sep 2023 06:00) (11 - 21)  SpO2: 99% (28 Sep 2023 06:00) (91% - 100%)    O2 Parameters below as of 28 Sep 2023 05:00  Patient On (Oxygen Delivery Method): nasal cannula  O2 Flow (L/min): 3            09-27 @ 07:01  -  09-28 @ 07:00  --------------------------------------------------------  IN: 705 mL / OUT: 1940 mL / NET: -1235 mL      CAPILLARY BLOOD GLUCOSE      POCT Blood Glucose.: 137 mg/dL (28 Sep 2023 05:47)      PHYSICAL EXAM:  General: chronically ill, bedbound. Pt awake, not responding to questions or commands. On 3L NC  HEENT: NCAT, PERRLA  Respiratory: CTA b/l, no wheezing, rhonchi, rales  Cardiovascular: irregular rhythm, no M/R/G  Abdomen: soft, NT/ND, condom catheter in place.  Extremities: +1 pitting edema on the dorsum of right foot. No calf tenderness, arm contracted.  Neurology: pt awake, opens eyes to voice. not responding to questions or commands    MEDICATIONS:  MEDICATIONS  (STANDING):  apixaban 5 milliGRAM(s) Oral every 12 hours  chlorhexidine 2% Cloths 1 Application(s) Topical <User Schedule>  diltiazem    Tablet 60 milliGRAM(s) Oral every 8 hours  heparin  Infusion. 600 Unit(s)/Hr (6 mL/Hr) IV Continuous <Continuous>  influenza  Vaccine (HIGH DOSE) 0.7 milliLiter(s) IntraMuscular once  latanoprost 0.005% Ophthalmic Solution 1 Drop(s) Both EYES at bedtime  mupirocin 2% Nasal 1 Application(s) Both Nostrils two times a day  pantoprazole  Injectable 40 milliGRAM(s) IV Push daily  piperacillin/tazobactam IVPB.. 3.375 Gram(s) IV Intermittent every 8 hours    MEDICATIONS  (PRN):      ALLERGIES:  Allergies    No Known Allergies    Intolerances        LABS:                        8.4    7.11  )-----------( 184      ( 28 Sep 2023 05:35 )             25.2     09-28    143  |  104  |  16  ----------------------------<  128<H>  3.5   |  33<H>  |  1.20    Ca    8.3<L>      28 Sep 2023 05:35  Phos  2.8     09-28  Mg     2.1     09-28      PT/INR - ( 27 Sep 2023 05:25 )   PT: 12.5 sec;   INR: 1.07 ratio         PTT - ( 27 Sep 2023 20:28 )  PTT:34.7 sec  Urinalysis Basic - ( 28 Sep 2023 05:35 )    Color: x / Appearance: x / SG: x / pH: x  Gluc: 128 mg/dL / Ketone: x  / Bili: x / Urobili: x   Blood: x / Protein: x / Nitrite: x   Leuk Esterase: x / RBC: x / WBC x   Sq Epi: x / Non Sq Epi: x / Bacteria: x            CENTRAL LINE: N        DATE INSERTED:             REMOVE: N  [place]: N                       DATE INSERTED:              REMOVE: Y/N  A-LINE: N                       DATE INSERTED:              REMOVE: Y/N      GLOBAL ISSUE/BEST PRACTICE  Analgesia: N  Sedation: N  HOB elevation: yes  Stress ulcer prophylaxis: Y  VTE prophylaxis: Y  Glycemic control: N  Nutrition: Y    CODE STATUS: DNR with trial of intubation, MOLST completed 9/18, in chart INTERVAL HPI/OVERNIGHT EVENTS: no overnight events    SUBJECTIVE: Patient seen and examined at bedside. Pt comfortable, awake, but inattentive, unable to respond to questions      OBJECTIVE:    VITAL SIGNS:  ICU Vital Signs Last 24 Hrs  T(C): 37.4 (28 Sep 2023 04:07), Max: 37.4 (28 Sep 2023 04:07)  T(F): 99.3 (28 Sep 2023 04:07), Max: 99.3 (28 Sep 2023 04:07)  HR: 100 (28 Sep 2023 06:00) (81 - 109)  BP: 143/83 (28 Sep 2023 06:00) (123/76 - 169/88)  BP(mean): 105 (28 Sep 2023 06:00) (95 - 121)  ABP: --  ABP(mean): --  RR: 16 (28 Sep 2023 06:00) (11 - 21)  SpO2: 99% (28 Sep 2023 06:00) (91% - 100%)    O2 Parameters below as of 28 Sep 2023 05:00  Patient On (Oxygen Delivery Method): nasal cannula  O2 Flow (L/min): 3            09-27 @ 07:01  -  09-28 @ 07:00  --------------------------------------------------------  IN: 705 mL / OUT: 1940 mL / NET: -1235 mL      CAPILLARY BLOOD GLUCOSE      POCT Blood Glucose.: 137 mg/dL (28 Sep 2023 05:47)      PHYSICAL EXAM:  General: chronically ill, bedbound. Pt awake, not responding to questions or commands. On 3L NC  HEENT: NCAT, PERRLA  Respiratory: CTA b/l, no wheezing, rhonchi, rales  Cardiovascular: irregular rhythm, no M/R/G  Abdomen: soft, NT/ND, condom catheter in place.  Extremities: +1 pitting edema on the dorsum of right foot. No calf tenderness, arm contracted.  Neurology: pt awake, opens eyes to voice. not responding to questions or commands    MEDICATIONS:  MEDICATIONS  (STANDING):  apixaban 5 milliGRAM(s) Oral every 12 hours  chlorhexidine 2% Cloths 1 Application(s) Topical <User Schedule>  diltiazem    Tablet 60 milliGRAM(s) Oral every 8 hours  heparin  Infusion. 600 Unit(s)/Hr (6 mL/Hr) IV Continuous <Continuous>  influenza  Vaccine (HIGH DOSE) 0.7 milliLiter(s) IntraMuscular once  latanoprost 0.005% Ophthalmic Solution 1 Drop(s) Both EYES at bedtime  mupirocin 2% Nasal 1 Application(s) Both Nostrils two times a day  pantoprazole  Injectable 40 milliGRAM(s) IV Push daily  piperacillin/tazobactam IVPB.. 3.375 Gram(s) IV Intermittent every 8 hours    MEDICATIONS  (PRN):      ALLERGIES:  Allergies    No Known Allergies    Intolerances        LABS:                        8.4    7.11  )-----------( 184      ( 28 Sep 2023 05:35 )             25.2     09-28    143  |  104  |  16  ----------------------------<  128<H>  3.5   |  33<H>  |  1.20    Ca    8.3<L>      28 Sep 2023 05:35  Phos  2.8     09-28  Mg     2.1     09-28      PT/INR - ( 27 Sep 2023 05:25 )   PT: 12.5 sec;   INR: 1.07 ratio         PTT - ( 27 Sep 2023 20:28 )  PTT:34.7 sec  Urinalysis Basic - ( 28 Sep 2023 05:35 )    Color: x / Appearance: x / SG: x / pH: x  Gluc: 128 mg/dL / Ketone: x  / Bili: x / Urobili: x   Blood: x / Protein: x / Nitrite: x   Leuk Esterase: x / RBC: x / WBC x   Sq Epi: x / Non Sq Epi: x / Bacteria: x            CENTRAL LINE: N        DATE INSERTED:             REMOVE: N  [place]: N                       DATE INSERTED:              REMOVE: Y/N  A-LINE: N                       DATE INSERTED:              REMOVE: Y/N      GLOBAL ISSUE/BEST PRACTICE  Analgesia: N  Sedation: N  HOB elevation: yes  Stress ulcer prophylaxis: Y  VTE prophylaxis: Y  Glycemic control: N  Nutrition: Y    CODE STATUS: DNR with trial of intubation, MOLST completed 9/27, in chart

## 2023-09-28 NOTE — PROGRESS NOTE ADULT - PROBLEM SELECTOR PLAN 6
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

## 2023-09-28 NOTE — PROGRESS NOTE ADULT - SUBJECTIVE AND OBJECTIVE BOX
Patient is a 73y old  Male who presents with a chief complaint of A- fib with RVR and lactic acidosis (28 Sep 2023 11:48)      INTERVAL HPI/OVERNIGHT EVENTS: No acute overnight events. Pt was seen and examined at bedside, alongside wife. Pt nonverbal, per baseline according to wife. Hx limited dt nonverbal status.    MEDICATIONS  (STANDING):  ammonium lactate 12% Lotion 1 Application(s) Topical two times a day  apixaban 5 milliGRAM(s) Oral every 12 hours  chlorhexidine 2% Cloths 1 Application(s) Topical <User Schedule>  diltiazem    Tablet 60 milliGRAM(s) Oral every 6 hours  influenza  Vaccine (HIGH DOSE) 0.7 milliLiter(s) IntraMuscular once  latanoprost 0.005% Ophthalmic Solution 1 Drop(s) Both EYES at bedtime  modafinil 100 milliGRAM(s) Oral daily  mupirocin 2% Nasal 1 Application(s) Both Nostrils two times a day  pantoprazole    Tablet 40 milliGRAM(s) Oral before breakfast  piperacillin/tazobactam IVPB.. 3.375 Gram(s) IV Intermittent every 8 hours    MEDICATIONS  (PRN):      Allergies    No Known Allergies    Intolerances        REVIEW OF SYSTEMS:  Limited dt nonverbal status as above.    Vital Signs Last 24 Hrs  T(C): 36.6 (28 Sep 2023 11:04), Max: 37.5 (28 Sep 2023 08:05)  T(F): 97.8 (28 Sep 2023 11:04), Max: 99.5 (28 Sep 2023 08:05)  HR: 90 (28 Sep 2023 12:31) (86 - 107)  BP: 149/90 (28 Sep 2023 12:31) (118/82 - 169/88)  BP(mean): 116 (28 Sep 2023 10:00) (95 - 125)  RR: 16 (28 Sep 2023 11:04) (11 - 16)  SpO2: 98% (28 Sep 2023 11:04) (91% - 100%)    Parameters below as of 28 Sep 2023 07:00  Patient On (Oxygen Delivery Method): nasal cannula  O2 Flow (L/min): 3      PHYSICAL EXAM:  GENERAL: NAD, cachectic-appearing  HEENT:  anicteric, moist mucous membranes  CHEST/LUNG:  CTA b/l, no rales, wheezes, or rhonchi  HEART:  RRR, S1, S2  ABDOMEN:  BS+, soft, nontender, nondistended  EXTREMITIES: no edema, cyanosis. DP Pulses 2+ B/L. B/L UE contracted in flexion with increased tone. B/L LE normal tone.  NERVOUS SYSTEM: AAO x1, is able to say own name with multiple prompts. Otherwise remains nonverbal.    LABS:                        8.4    7.11  )-----------( 184      ( 28 Sep 2023 05:35 )             25.2     CBC Full  -  ( 28 Sep 2023 05:35 )  WBC Count : 7.11 K/uL  Hemoglobin : 8.4 g/dL  Hematocrit : 25.2 %  Platelet Count - Automated : 184 K/uL  Mean Cell Volume : 78.0 fl  Mean Cell Hemoglobin : 26.0 pg  Mean Cell Hemoglobin Concentration : 33.3 gm/dL  Auto Neutrophil # : 5.75 K/uL  Auto Lymphocyte # : 0.83 K/uL  Auto Monocyte # : 0.42 K/uL  Auto Eosinophil # : 0.05 K/uL  Auto Basophil # : 0.03 K/uL  Auto Neutrophil % : 80.9 %  Auto Lymphocyte % : 11.7 %  Auto Monocyte % : 5.9 %  Auto Eosinophil % : 0.7 %  Auto Basophil % : 0.4 %    28 Sep 2023 05:35    143    |  104    |  16     ----------------------------<  128    3.5     |  33     |  1.20     Ca    8.3        28 Sep 2023 05:35  Phos  2.8       28 Sep 2023 05:35  Mg     2.1       28 Sep 2023 05:35    TPro  4.9    /  Alb  x      /  TBili  x      /  DBili  x      /  AST  x      /  ALT  x      /  AlkPhos  x      28 Sep 2023 05:35    PT/INR - ( 27 Sep 2023 05:25 )   PT: 12.5 sec;   INR: 1.07 ratio         PTT - ( 27 Sep 2023 20:28 )  PTT:34.7 sec  Urinalysis Basic - ( 28 Sep 2023 05:35 )    Color: x / Appearance: x / SG: x / pH: x  Gluc: 128 mg/dL / Ketone: x  / Bili: x / Urobili: x   Blood: x / Protein: x / Nitrite: x   Leuk Esterase: x / RBC: x / WBC x   Sq Epi: x / Non Sq Epi: x / Bacteria: x      CAPILLARY BLOOD GLUCOSE      POCT Blood Glucose.: 137 mg/dL (28 Sep 2023 05:47)  POCT Blood Glucose.: 94 mg/dL (27 Sep 2023 23:55)  POCT Blood Glucose.: 165 mg/dL (27 Sep 2023 18:01)        Culture - Blood (collected 09-25-23 @ 16:43)  Source: .Blood Blood-Peripheral  Preliminary Report (09-27-23 @ 22:01):    No growth at 48 Hours    Culture - Urine (collected 09-25-23 @ 16:05)  Source: Clean Catch Clean Catch (Midstream)  Final Report (09-26-23 @ 22:51):    No growth    Culture - Blood (collected 09-25-23 @ 14:20)  Source: .Blood Blood-Peripheral  Preliminary Report (09-27-23 @ 22:01):    No growth at 48 Hours        RADIOLOGY & ADDITIONAL TESTS: ____    Personally reviewed.     Consultant(s) Notes Reviewed:  [x] YES  [ ] NO     Patient is a 73y old  Male who presents with a chief complaint of vomiting and diarrhea.      INTERVAL HPI/OVERNIGHT EVENTS: No acute overnight events. Pt was seen and examined at bedside, alongside wife. Pt essentially nonverbal at baseline according to wife. Cannot obtain ROS given pt not answering questions.     MEDICATIONS  (STANDING):  ammonium lactate 12% Lotion 1 Application(s) Topical two times a day  apixaban 5 milliGRAM(s) Oral every 12 hours  chlorhexidine 2% Cloths 1 Application(s) Topical <User Schedule>  diltiazem    Tablet 60 milliGRAM(s) Oral every 6 hours  influenza  Vaccine (HIGH DOSE) 0.7 milliLiter(s) IntraMuscular once  latanoprost 0.005% Ophthalmic Solution 1 Drop(s) Both EYES at bedtime  modafinil 100 milliGRAM(s) Oral daily  mupirocin 2% Nasal 1 Application(s) Both Nostrils two times a day  pantoprazole    Tablet 40 milliGRAM(s) Oral before breakfast  piperacillin/tazobactam IVPB.. 3.375 Gram(s) IV Intermittent every 8 hours    MEDICATIONS  (PRN):      Allergies    No Known Allergies    Intolerances        REVIEW OF SYSTEMS:  Cannot obtain ROS given pt not answering questions; essentially nonverbal at baseline.     Vital Signs Last 24 Hrs  T(C): 36.6 (28 Sep 2023 11:04), Max: 37.5 (28 Sep 2023 08:05)  T(F): 97.8 (28 Sep 2023 11:04), Max: 99.5 (28 Sep 2023 08:05)  HR: 90 (28 Sep 2023 12:31) (86 - 107)  BP: 149/90 (28 Sep 2023 12:31) (118/82 - 169/88)  BP(mean): 116 (28 Sep 2023 10:00) (95 - 125)  RR: 16 (28 Sep 2023 11:04) (11 - 16)  SpO2: 98% (28 Sep 2023 11:04) (91% - 100%)    Parameters below as of 28 Sep 2023 07:00  Patient On (Oxygen Delivery Method): nasal cannula  O2 Flow (L/min): 3      PHYSICAL EXAM:  GENERAL: NAD, frail  HEENT:  anicteric, moist mucous membranes  CHEST/LUNG: decreased breath sounds, difficult to assess as pt with slow/shallow breathing; no rales, wheezes, or rhonchi  HEART:  RRR, S1, S2  ABDOMEN:  BS+, soft, nontender, nondistended  EXTREMITIES: no edema, cyanosis. B/L UE contracted in flexion with increased tone. muscle atrophy  NERVOUS SYSTEM: AAO x1, is able to say own name with multiple prompts. Otherwise remains nonverbal. not following commands well    LABS:                        8.4    7.11  )-----------( 184      ( 28 Sep 2023 05:35 )             25.2     CBC Full  -  ( 28 Sep 2023 05:35 )  WBC Count : 7.11 K/uL  Hemoglobin : 8.4 g/dL  Hematocrit : 25.2 %  Platelet Count - Automated : 184 K/uL  Mean Cell Volume : 78.0 fl  Mean Cell Hemoglobin : 26.0 pg  Mean Cell Hemoglobin Concentration : 33.3 gm/dL  Auto Neutrophil # : 5.75 K/uL  Auto Lymphocyte # : 0.83 K/uL  Auto Monocyte # : 0.42 K/uL  Auto Eosinophil # : 0.05 K/uL  Auto Basophil # : 0.03 K/uL  Auto Neutrophil % : 80.9 %  Auto Lymphocyte % : 11.7 %  Auto Monocyte % : 5.9 %  Auto Eosinophil % : 0.7 %  Auto Basophil % : 0.4 %    28 Sep 2023 05:35    143    |  104    |  16     ----------------------------<  128    3.5     |  33     |  1.20     Ca    8.3        28 Sep 2023 05:35  Phos  2.8       28 Sep 2023 05:35  Mg     2.1       28 Sep 2023 05:35    TPro  4.9    /  Alb  x      /  TBili  x      /  DBili  x      /  AST  x      /  ALT  x      /  AlkPhos  x      28 Sep 2023 05:35    PT/INR - ( 27 Sep 2023 05:25 )   PT: 12.5 sec;   INR: 1.07 ratio         PTT - ( 27 Sep 2023 20:28 )  PTT:34.7 sec  Urinalysis Basic - ( 28 Sep 2023 05:35 )    Color: x / Appearance: x / SG: x / pH: x  Gluc: 128 mg/dL / Ketone: x  / Bili: x / Urobili: x   Blood: x / Protein: x / Nitrite: x   Leuk Esterase: x / RBC: x / WBC x   Sq Epi: x / Non Sq Epi: x / Bacteria: x      CAPILLARY BLOOD GLUCOSE      POCT Blood Glucose.: 137 mg/dL (28 Sep 2023 05:47)  POCT Blood Glucose.: 94 mg/dL (27 Sep 2023 23:55)  POCT Blood Glucose.: 165 mg/dL (27 Sep 2023 18:01)        Culture - Blood (collected 09-25-23 @ 16:43)  Source: .Blood Blood-Peripheral  Preliminary Report (09-27-23 @ 22:01):    No growth at 48 Hours    Culture - Urine (collected 09-25-23 @ 16:05)  Source: Clean Catch Clean Catch (Midstream)  Final Report (09-26-23 @ 22:51):    No growth    Culture - Blood (collected 09-25-23 @ 14:20)  Source: .Blood Blood-Peripheral  Preliminary Report (09-27-23 @ 22:01):    No growth at 48 Hours        RADIOLOGY & ADDITIONAL TESTS: ____    Personally reviewed.     Consultant(s) Notes Reviewed:  [x] YES  [ ] NO

## 2023-09-28 NOTE — PROGRESS NOTE ADULT - PROBLEM SELECTOR PLAN 3
- On admission  BUN/Cr: 24/1.4  - Baseline 0.6-0.8, per chart review, today 20/1.3  - Likely ATN 2/2 sepsis   - consider Nephro eval  - Sodium Bicarb gtt d/c'd  - K+ repleted  - given pt is bedbound with very low muscle mass, his renal function is likely much more poor than appears on GFR / CrCl calculations for patients of this body weight   - rec against future use of metformin  - Care per ICU - On admission BUN/Cr: 24/1.4  - Baseline 0.7-0.8, per chart review, today 20/1.3  - Likely ATN 2/2 sepsis   - consider Nephro eval  - Sodium Bicarb gtt d/c'd  - K+ repleted  - given pt is bedbound with very low muscle mass, his renal function is likely much more poor than appears on GFR / CrCl calculations for patients of this body weight   - rec against future use of metformin

## 2023-09-28 NOTE — PROGRESS NOTE ADULT - PROBLEM SELECTOR PLAN 2
- Patient on admission with a fib wit RVR  at rates 120-130's, at times ranges >170's in the setting of sepsis  - EKG: A fib with RVR 128bpm QTc 458 ms    - s/p Cardizem gtt  - C/w Cardizem PO  - Hold Hydralazine per Cardio  - No evidence of volume overload  - C/w Eliquis PO  - Normal TTE 8/15/23  - [facility] following - Patient on admission with a fib with RVR at rates 120-130s, at times ranging >170s in the setting of sepsis  - EKG: A fib with RVR 128bpm QTc 458 ms   - s/p Cardizem gtt  - C/w Cardizem PO  - Hold Hydralazine per Cardio  - No evidence of volume overload  - C/w Eliquis PO  - Normal TTE 8/15/23  - Cardio [facility] following, recs appreciated

## 2023-09-28 NOTE — PROGRESS NOTE ADULT - ASSESSMENT
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 for n/v/d. Admitted to ICU for severe sepsis likely 2/2 rectosigmoid colitis, resulting in AFib with RVR and anion gap metabolic acidosis. Pt currently stable, Afib is rate controlled and resolved anion gap metabolic acidosis.     Problem List:  1. Afib w/ RVR  2. Lactic acidosis  3. Sepsis  4. LATOYA on CKD  5. DM2  6. HTN  7. New humeral lucencies    Neuro:  -metabolic encephalopathy improving, today pt is awake and alert, responds to voice  -Holding home remeron and modafinil in setting of mental status changes    CV:  -Afib w/ RVR likely 2/2 severe sepsis, currently rate controlled  -transition to PO for Cardizem: 60mg q8h PO, dc cardizem gtt  -transition to Eliquis: 5mg, q12h, dc heparin gtt  -Lactate 14 on admission, downtrending. Recheck on 9/28/2023.  -Hx HTN, holding home hydralazine    Pulm:  -currently on 3L NC and stable  -CT consistent with aspiration pneumonia, but clinically doubtful    Renal:   -high anion gap metabolic acidosis 2/2 lactic acidosis w/ appropriate resp compensation, improving, anion gap has resolved.  -CKD3 at baseline  - given K rider x3 for low K+ (3.2)  - bicarb gtt DC'd.  -Condom cath, monitor UOP  - dc LR at 75cc/hr    GI:  - Speech & Swallow evaluation: recommends bite size solids w/ thin liquids  - consider switch to PO PPI  -Colitis possibly secondary to infectious cause vs ischemic cause vs inflammatory cause, surgery consult revealed ischemic cause to be unlikely  - Surgery consulted, no acute actions  - -Pt had no BM since admission, on discharge, recommend bowel regimen with miralax and/or senna      ID:  -Severe sepsis likely from rectosigmoid colitis vs questionable aspiration pneumonia  -WBC dropped precipitously to 6.34K, similar occurrence on prior admissions   -BCx and UCx returned no growth over past 24hr  - continue Empiric Zoysn for colits  -MRSA PCR positive, on mupirocin ointment nares       Heme:   -Monitor h/h, appears pt baseline hemoglobin is ~10, hgb = 9.4  -dc heparin gtt, transition to Eliquis: 5mg, q12h     Endo:  -Hx DM2, recommend stopping metformin on discharge due to recurrent lactic acidosis, ?contribution    MSK:  -CXR read w/ humeral lucencies  -Humeral x-ray revealed bilateral cortical humeral lucencies, possibly multiple myeloma, check SPEP, UPEP, light chains   74 y/o M w/ pmh of 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 Admitted to ICU for severe sepsis likely 2/2 rectosigmoid colitis, resulting in AFib with RVR and anion gap metabolic acidosis. Pt currently stable, Afib is rate controlled and resolved anion gap metabolic acidosis.     Problem List:  1. Afib w/ RVR  2. Lactic acidosis  3. Sepsis  4. LATOYA on CKD  5. DM2  6. HTN  7. New humeral lucencies    Neuro:  -metabolic encephalopathy improving, pt awake.   -hypoactive delirium: turn lights on for pt during the day  -Start home med modafinil     CV:  -Afib w/ RVR likely 2/2 severe sepsis, currently rate controlled  -increase dose for cardizem: 60mg q6h PO  -Hx HTN, holding home hydralazine    Pulm:  -currently on 3L NC and stable      Renal:   -high anion gap lactate acidosis resolved  -CKD2, baseline CKD3  -given K rider x4 for low K+ (3.5)  -Condom cath, monitor UOP      GI:  -switch to PO PPI  -colitis under control w/ Zoysn  -Pt had no BM since admission, on discharge, recommend bowel regimen with miralax and/or senna      ID:  -Severe sepsis likely from rectosigmoid colitis vs questionable aspiration pneumonia  -WBC returned to 7.11K   -BCx and UCx returned no growth over past 48hr  -continue Empiric Zoysn for colitis  -MRSA PCR positive, on mupirocin ointment nares       Heme:   -Monitor h/h, appears pt baseline hemoglobin is ~10, hgb = 8.4  -Eliquis: 5mg, q12h     Endo:  -Hx DM2, recommend stopping metformin on discharge due to recurrent lactic acidosis, ?contribution    MSK:  -Humeral x-ray revealed bilateral cortical humeral lucencies, possibly multiple myeloma. 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 Admitted to ICU for severe sepsis likely 2/2 rectosigmoid colitis, resulting in AFib with RVR and anion gap metabolic acidosis. Pt currently stable, Afib is rate controlled and resolved anion gap metabolic acidosis.     Problem List:  1. Afib w/ RVR  2. Lactic acidosis  3. Sepsis  4. LATOYA on CKD  5. DM2  6. HTN  7. New humeral lucencies    Neuro:  -metabolic encephalopathy improving, pt awake.   -hypoactive delirium: turn lights on for pt during the day  -Start home med modafinil     CV:  -Afib w/ RVR likely 2/2 severe sepsis, currently rate controlled  -increase dose for cardizem: 60mg q6h PO  -Hx HTN, holding home hydralazine    Pulm:  -currently on 3L NC and stable      Renal:   -high anion gap lactate acidosis resolved  -CKD2, baseline CKD3  -given K rider x4 for low K+ (3.5)  -Condom cath, monitor UOP      GI:  -switch to PO PPI  -colitis under control w/ Zoysn, 5d to 7d course depending on pt progress  -Pt had no BM since admission, on discharge, recommend bowel regimen with miralax and/or senna      ID:  -Severe sepsis likely from rectosigmoid colitis vs questionable aspiration pneumonia  -WBC returned to 7.11K   -BCx and UCx returned no growth over past 48hr  -continue Empiric Zoysn for colitis  -MRSA PCR positive, on mupirocin ointment nares       Heme:   -Monitor h/h, appears pt baseline hemoglobin is ~10, hgb = 8.4  -Eliquis: 5mg, q12h     Endo:  -Hx DM2, recommend stopping metformin on discharge due to recurrent lactic acidosis, ?contribution    MSK:  -Humeral x-ray revealed bilateral cortical humeral lucencies, possibly multiple myeloma. 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 Admitted to ICU for severe sepsis likely 2/2 rectosigmoid colitis, resulting in AFib with RVR and anion gap metabolic acidosis. Pt currently stable, Afib is rate controlled and resolved anion gap metabolic acidosis.     Problem List:  1. Afib w/ RVR  2. Lactic acidosis  3. Sepsis  4. LATOYA on CKD  5. DM2  6. HTN  7. New humeral lucencies    Neuro:  -metabolic encephalopathy improving, pt awake.   - possible hypoactive delirium: turn lights on for pt during the day  -Start home med modafinil   - hold home remeron    CV:  -Afib w/ RVR likely 2/2 severe sepsis, currently rate controlled  -increase dose for cardizem: 60mg q6h PO  -Hx HTN, holding home hydralazine    Pulm:  -currently on 3L NC and stable      Renal:   -high anion gap lactate acidosis resolved  -CKD2, baseline CKD3  -given PO K for low K+ (3.5)  -Condom cath, monitor UOP      GI:  -switch to PO pantoprazole  -colitis under control w/ Zoysn, 5d to 7d course depending on pt progress  -Pt had no BM since admission, on discharge, recommend bowel regimen with miralax and/or senna      ID:  -Severe sepsis likely from rectosigmoid colitis vs questionable aspiration pneumonia  -WBC returned to 7.11K   - Lactate downtrended to wnl  -BCx and UCx returned no growth over past 48hr  -continue Empiric Zoysn for colitis  -MRSA PCR positive, on mupirocin ointment nares       Heme:   -Monitor h/h, appears pt baseline hemoglobin is ~10, hgb = 8.4  -Eliquis: 5mg, q12h     Endo:  -Hx DM2, recommend stopping metformin on discharge due to recurrent lactic acidosis, ?contribution  - blood glucose within goal    MSK:  -Humeral x-ray revealed bilateral cortical humeral lucencies, possibly multiple myeloma. Clinically low suspicion due to pt's platelet, WBC, Cr and Ca2+ lvls  -F/U: SPEP, UPEP, light chains

## 2023-09-28 NOTE — PROGRESS NOTE ADULT - PROBLEM SELECTOR PLAN 4
- at home on metformin, suspecting metformin induced lactic acidosis is contributing to very high LA level on admission  - given pt is bedbound with very low muscle mass, his renal function is likely much more poor than appears on GFR / CrCl calculations for patients of this body weight   - rec against future use of metformin  - ISS

## 2023-09-28 NOTE — PROGRESS NOTE ADULT - PROBLEM SELECTOR PLAN 7
-CXR: Humeral lucencies requiring further evaluation neoplastic involvement is a consideration.   -B/L: Small tiny cortical lucencies in each humeral shaft, better characterized on concurrent CT. Differential diagnosis includes multiple myeloma  -outpt f/up if family wish to pursue

## 2023-09-28 NOTE — CONSULT NOTE ADULT - ASSESSMENT
[facility]  Chart Reviewed-Full Consult to follow for any immediate concerns please fell free to contact us directly at  [phone] and have us paged or text my cell # [phone]  Ovidio Manning MD PhD   72 y/o M with PMHx T2DM on metformin with peripheral neuropathy, HTN, HLD A-fib (on Eliquis), TBI (s/p SDH with EVAC on 9/2021), and traumatic subdural hemorrhage in 10/2022 Covid Pna, and major depression presented to the ED with c/o vomiting and diarrhea. Pt started vomiting non stop with liquid BM for at least 1 1/2 hrs. Pt is at home, he has home aide 10/hr per day, bedbound at baseline. In ER, pt found to be in sepsis with lactate of 14 and in rapid Afib. Initially admitted to ICU with concern for sepsis with asp PNA vs colitis in differential.    RECOMMENDATIONS  1-SEPSIS pt started on Zosyn 9/25 afternoon, micro all NGTD and pt is MRSA PCR+ but improved without MRSA coverage. Currently with nl wbc, normothermic, so recommend continued Zosyn for now but anticipate we will be able de-escalate -recs to follow    Thank you for consulting us and involving us in the management of this most interesting and challenging case.  We will follow along in the care of this patient. Please call us at [phone] or text me directly on my cell# at [phone] with any concerns.

## 2023-09-28 NOTE — PROGRESS NOTE ADULT - PROBLEM SELECTOR PLAN 5
- /60 on admission   - C/w home Diltiazem, hold home hydralazine as above per Cardio  - DASH/TLC   - monitor BP - /60 on admission   - C/w Diltiazem, hold home hydralazine as above per Cardio  - DASH/TLC   - monitor BP

## 2023-09-28 NOTE — PROGRESS NOTE ADULT - ASSESSMENT
72yo M with PMHx DM2 on metformin with peripheral neuropathy, HTN, HLD A-fib (on Eliquis), TBI (s/p SDH with EVAC on 9/2021), and traumatic subdural hemorrhage in 10/2022 Covid Pna, and major depression presented to the ED with c/o vomiting and diarrhea a/w severe lactic acidosis, severe sepsis due to suspected aspiration PNA, rectosigmoid colitis, and likely in part due to metformin toxicity in setting of LATOYA.    74yo M with PMHx of DM2 on metformin with peripheral neuropathy, HTN, HLD A-fib (on Eliquis), TBI (s/p SDH with EVAC on 9/2021), and traumatic subdural hemorrhage in 10/2022 COVID PNA, and major depression presented to the ED with c/o vomiting and diarrhea a/w severe lactic acidosis, severe sepsis due to suspected aspiration PNA, rectosigmoid colitis, and likely in part due to metformin toxicity in setting of LATOYA.

## 2023-09-28 NOTE — PROGRESS NOTE ADULT - PROBLEM SELECTOR PLAN 8
#DVT ppx: on Eliquis    #Dispo: home in 1-2 days if stable    PT is DNR/DNI #DVT ppx: on Eliquis    #Dispo: home in 1-2 days if stable    Pt is DNR/DNI

## 2023-09-28 NOTE — PROGRESS NOTE ADULT - ASSESSMENT
72 y/o M with CAD, Afib, HTN, HLD, BPH, peripheral neuropathy, SDH s/p craniotomy, Covid Pna, and major depression presented to the ED c/o vomiting and diarrhea, found to be in sepsis and rapid Afib.    Rapid Afib/Sepsis  - Was recently admitted for sepsis in the setting of Pna requiring ICU admission  - With known Hx of Afib and on Eliquis half a dose (half dose due to history of recurrent SDH)  - Tates are still uncontrolled on PO dilt  - if bp tolerates increase to 90mg q6  - eliquis resumed. now on full dose. please monitor closely has had hx of recurrent spontaneous SDH. He also has had sig DVTs off of AC.   - may need neuro to weigh in on risk of full dose ac at this point.      - no clear vol ol  - Had TTE which was tls but overall preserved ef in 8/15/23.  No need to repeat    - no acute ischemia  - presumed CAD on the basis of inferior q waves, but no definite cad  - Would resume statin when able    - BP stable and controlled but would hold home Hydralazine for now in the setting of sepsis and to allow for uptitrating of avn agents.   - Monitor electrolytes, replete to keep K>4 and Mag>2     - Further cardiac workup will depend on clinical course.   - All other workup per primary team. Will followup.

## 2023-09-29 ENCOUNTER — TRANSCRIPTION ENCOUNTER (OUTPATIENT)
Age: 73
End: 2023-09-29

## 2023-09-29 LAB
ANION GAP SERPL CALC-SCNC: 7 MMOL/L — SIGNIFICANT CHANGE UP (ref 5–17)
BASOPHILS # BLD AUTO: 0.02 K/UL — SIGNIFICANT CHANGE UP (ref 0–0.2)
BASOPHILS NFR BLD AUTO: 0.4 % — SIGNIFICANT CHANGE UP (ref 0–2)
BUN SERPL-MCNC: 12 MG/DL — SIGNIFICANT CHANGE UP (ref 7–23)
CALCIUM SERPL-MCNC: 8.4 MG/DL — LOW (ref 8.5–10.1)
CHLORIDE SERPL-SCNC: 106 MMOL/L — SIGNIFICANT CHANGE UP (ref 96–108)
CO2 SERPL-SCNC: 30 MMOL/L — SIGNIFICANT CHANGE UP (ref 22–31)
CREAT SERPL-MCNC: 0.98 MG/DL — SIGNIFICANT CHANGE UP (ref 0.5–1.3)
EGFR: 82 ML/MIN/1.73M2 — SIGNIFICANT CHANGE UP
EOSINOPHIL # BLD AUTO: 0.09 K/UL — SIGNIFICANT CHANGE UP (ref 0–0.5)
EOSINOPHIL NFR BLD AUTO: 1.6 % — SIGNIFICANT CHANGE UP (ref 0–6)
FERRITIN SERPL-MCNC: 129 NG/ML — SIGNIFICANT CHANGE UP (ref 30–400)
FOLATE SERPL-MCNC: 5.7 NG/ML — SIGNIFICANT CHANGE UP
GLUCOSE SERPL-MCNC: 115 MG/DL — HIGH (ref 70–99)
HCT VFR BLD CALC: 24.8 % — LOW (ref 39–50)
HGB BLD-MCNC: 8.2 G/DL — LOW (ref 13–17)
IMM GRANULOCYTES NFR BLD AUTO: 0.5 % — SIGNIFICANT CHANGE UP (ref 0–0.9)
IRON SATN MFR SERPL: 14 % — LOW (ref 16–55)
IRON SATN MFR SERPL: 30 UG/DL — LOW (ref 45–165)
LYMPHOCYTES # BLD AUTO: 0.95 K/UL — LOW (ref 1–3.3)
LYMPHOCYTES # BLD AUTO: 17.4 % — SIGNIFICANT CHANGE UP (ref 13–44)
MAGNESIUM SERPL-MCNC: 1.9 MG/DL — SIGNIFICANT CHANGE UP (ref 1.6–2.6)
MCHC RBC-ENTMCNC: 25.9 PG — LOW (ref 27–34)
MCHC RBC-ENTMCNC: 33.1 GM/DL — SIGNIFICANT CHANGE UP (ref 32–36)
MCV RBC AUTO: 78.2 FL — LOW (ref 80–100)
MONOCYTES # BLD AUTO: 0.47 K/UL — SIGNIFICANT CHANGE UP (ref 0–0.9)
MONOCYTES NFR BLD AUTO: 8.6 % — SIGNIFICANT CHANGE UP (ref 2–14)
NEUTROPHILS # BLD AUTO: 3.91 K/UL — SIGNIFICANT CHANGE UP (ref 1.8–7.4)
NEUTROPHILS NFR BLD AUTO: 71.5 % — SIGNIFICANT CHANGE UP (ref 43–77)
NRBC # BLD: 0 /100 WBCS — SIGNIFICANT CHANGE UP (ref 0–0)
PHOSPHATE SERPL-MCNC: 2.8 MG/DL — SIGNIFICANT CHANGE UP (ref 2.5–4.5)
PLATELET # BLD AUTO: 165 K/UL — SIGNIFICANT CHANGE UP (ref 150–400)
POTASSIUM SERPL-MCNC: 3.4 MMOL/L — LOW (ref 3.5–5.3)
POTASSIUM SERPL-SCNC: 3.4 MMOL/L — LOW (ref 3.5–5.3)
RBC # BLD: 3.17 M/UL — LOW (ref 4.2–5.8)
RBC # FLD: 20.2 % — HIGH (ref 10.3–14.5)
SODIUM SERPL-SCNC: 143 MMOL/L — SIGNIFICANT CHANGE UP (ref 135–145)
TIBC SERPL-MCNC: 217 UG/DL — LOW (ref 220–430)
UIBC SERPL-MCNC: 187 UG/DL — SIGNIFICANT CHANGE UP (ref 110–370)
VIT B12 SERPL-MCNC: 414 PG/ML — SIGNIFICANT CHANGE UP (ref 232–1245)
WBC # BLD: 5.47 K/UL — SIGNIFICANT CHANGE UP (ref 3.8–10.5)
WBC # FLD AUTO: 5.47 K/UL — SIGNIFICANT CHANGE UP (ref 3.8–10.5)

## 2023-09-29 PROCEDURE — 99233 SBSQ HOSP IP/OBS HIGH 50: CPT | Mod: GC

## 2023-09-29 PROCEDURE — 70450 CT HEAD/BRAIN W/O DYE: CPT | Mod: 26

## 2023-09-29 PROCEDURE — 99232 SBSQ HOSP IP/OBS MODERATE 35: CPT

## 2023-09-29 RX ORDER — POTASSIUM CHLORIDE 20 MEQ
40 PACKET (EA) ORAL EVERY 6 HOURS
Refills: 0 | Status: COMPLETED | OUTPATIENT
Start: 2023-09-29 | End: 2023-09-29

## 2023-09-29 RX ORDER — METFORMIN HYDROCHLORIDE 1000 MG/1
1000 TABLET, COATED ORAL
Qty: 180 | Refills: 3 | Status: DISCONTINUED | COMMUNITY
Start: 2022-12-08 | End: 2023-09-29

## 2023-09-29 RX ORDER — METFORMIN HYDROCHLORIDE 500 MG/1
500 TABLET, COATED ORAL
Qty: 360 | Refills: 3 | Status: DISCONTINUED | COMMUNITY
Start: 2023-09-28 | End: 2023-09-29

## 2023-09-29 RX ORDER — APIXABAN 2.5 MG/1
2.5 TABLET, FILM COATED ORAL
Refills: 0 | Status: DISCONTINUED | OUTPATIENT
Start: 2023-09-29 | End: 2023-09-30

## 2023-09-29 RX ORDER — METRONIDAZOLE 500 MG
500 TABLET ORAL EVERY 8 HOURS
Refills: 0 | Status: COMPLETED | OUTPATIENT
Start: 2023-09-29 | End: 2023-09-30

## 2023-09-29 RX ORDER — METRONIDAZOLE 500 MG
500 TABLET ORAL EVERY 8 HOURS
Refills: 0 | Status: DISCONTINUED | OUTPATIENT
Start: 2023-09-29 | End: 2023-09-29

## 2023-09-29 RX ORDER — CEFTRIAXONE 500 MG/1
1000 INJECTION, POWDER, FOR SOLUTION INTRAMUSCULAR; INTRAVENOUS ONCE
Refills: 0 | Status: COMPLETED | OUTPATIENT
Start: 2023-09-30 | End: 2023-09-30

## 2023-09-29 RX ORDER — CEFTRIAXONE 500 MG/1
1000 INJECTION, POWDER, FOR SOLUTION INTRAMUSCULAR; INTRAVENOUS ONCE
Refills: 0 | Status: COMPLETED | OUTPATIENT
Start: 2023-09-29 | End: 2023-09-29

## 2023-09-29 RX ADMIN — PIPERACILLIN AND TAZOBACTAM 25 GRAM(S): 4; .5 INJECTION, POWDER, LYOPHILIZED, FOR SOLUTION INTRAVENOUS at 05:28

## 2023-09-29 RX ADMIN — CEFTRIAXONE 100 MILLIGRAM(S): 500 INJECTION, POWDER, FOR SOLUTION INTRAMUSCULAR; INTRAVENOUS at 12:46

## 2023-09-29 RX ADMIN — Medication 1 APPLICATION(S): at 17:15

## 2023-09-29 RX ADMIN — MUPIROCIN 1 APPLICATION(S): 20 OINTMENT TOPICAL at 17:16

## 2023-09-29 RX ADMIN — Medication 60 MILLIGRAM(S): at 11:58

## 2023-09-29 RX ADMIN — Medication 60 MILLIGRAM(S): at 17:15

## 2023-09-29 RX ADMIN — Medication 40 MILLIEQUIVALENT(S): at 11:58

## 2023-09-29 RX ADMIN — Medication 40 MILLIEQUIVALENT(S): at 17:15

## 2023-09-29 RX ADMIN — Medication 1 APPLICATION(S): at 05:27

## 2023-09-29 RX ADMIN — MUPIROCIN 1 APPLICATION(S): 20 OINTMENT TOPICAL at 05:28

## 2023-09-29 RX ADMIN — Medication 60 MILLIGRAM(S): at 05:28

## 2023-09-29 RX ADMIN — Medication 100 MILLIGRAM(S): at 14:18

## 2023-09-29 RX ADMIN — Medication 100 MILLIGRAM(S): at 21:18

## 2023-09-29 RX ADMIN — APIXABAN 5 MILLIGRAM(S): 2.5 TABLET, FILM COATED ORAL at 05:28

## 2023-09-29 RX ADMIN — LATANOPROST 1 DROP(S): 0.05 SOLUTION/ DROPS OPHTHALMIC; TOPICAL at 21:17

## 2023-09-29 RX ADMIN — MODAFINIL 100 MILLIGRAM(S): 200 TABLET ORAL at 11:58

## 2023-09-29 RX ADMIN — APIXABAN 2.5 MILLIGRAM(S): 2.5 TABLET, FILM COATED ORAL at 17:51

## 2023-09-29 NOTE — PROGRESS NOTE ADULT - PROBLEM SELECTOR PLAN 3
- On admission BUN/Cr: 24/1.4  - Baseline 0.7-0.8, per chart review, today 20/1.3  - Likely ATN 2/2 sepsis with component of GORDON  - Sodium Bicarb gtt d/c'd  - K+ repleted  - given pt is bedbound with very low muscle mass, his renal function is likely much more poor than appears on GFR / CrCl calculations for patients of this body weight   - rec against future use of metformin - On admission BUN/Cr: 24/1.4  - Baseline 0.7-0.8, per chart review, today 12/0.98  - Likely ATN 2/2 sepsis with component of GORDON  - Sodium Bicarb gtt d/c'd  - K+ repleted  - given pt is bedbound with very low muscle mass, his renal function is likely much more poor than appears on GFR / CrCl calculations for patients of this body weight   - rec against future use of metformin

## 2023-09-29 NOTE — PROGRESS NOTE ADULT - PROBLEM SELECTOR PLAN 5
- /60 on admission   - C/w Diltiazem, hold home hydralazine as above per Cardio  - DASH/TLC   - monitor BP - /60 on admission   - C/w Diltiazem, hold home hydralazine - consider restarting low dose hydralazine as BP starting to rise  - DASH/TLC   - monitor BP

## 2023-09-29 NOTE — CASE MANAGEMENT PROGRESS NOTE - NSCMPROGRESSNOTE_GEN_ALL_CORE
THANG spoke to covering THANG Villasenor at [facility] ([phone]) in regards or re-instating HHA for tomorrow.  Aide will be available tomorrow at 4 PM due to patient will be  at 3:30 PM from the hospital.  As per representative, aide can serve her full 8 hrs tomorrow in the evening and continue with her regular schedule on Sunday.  Please fax DC documents to [phone]. THANG spoke to wife Love to make aware.  She is in agreement of discharge plans. CM remains available throughout hospital stay.

## 2023-09-29 NOTE — DISCHARGE NOTE NURSING/CASE MANAGEMENT/SOCIAL WORK - PATIENT PORTAL LINK FT
You can access the FollowMyHealth Patient Portal offered by [facility] by registering at the following website: [email or web address]. By joining [employer]’s FollowMyHealth portal, you will also be able to view your health information using other applications (apps) compatible with our system.

## 2023-09-29 NOTE — PROGRESS NOTE ADULT - SUBJECTIVE AND OBJECTIVE BOX
[facility] DIVISION of INFECTIOUS DISEASE  Ovidio Manning MD PhD, Jojo Boggs MD, Hafsa Bhardwaj MD, Danica Goldsmith MD, Samy Terry MD  and providing coverage with Meet Perea MD  Providing Infectious Disease Consultations at [facility], [facility], [facility]'s    Office# [phone] to schedule follow up appointments  Answering Service for urgent calls or New Consults [phone]  Cell# to text for urgent issues Ovidio Manning [phone]     infectious diseases progress note:    MELBA PARMAR is a 73y y. o. Male patient    Overnight and events of the last 24hrs reviewed    Allergies    No Known Allergies    Intolerances        ANTIBIOTICS/RELEVANT:  antimicrobials  piperacillin/tazobactam IVPB.. 3.375 Gram(s) IV Intermittent every 8 hours    immunologic:  influenza  Vaccine (HIGH DOSE) 0.7 milliLiter(s) IntraMuscular once    OTHER:  ammonium lactate 12% Lotion 1 Application(s) Topical two times a day  apixaban 5 milliGRAM(s) Oral every 12 hours  diltiazem    Tablet 60 milliGRAM(s) Oral every 6 hours  latanoprost 0.005% Ophthalmic Solution 1 Drop(s) Both EYES at bedtime  modafinil 100 milliGRAM(s) Oral daily  mupirocin 2% Nasal 1 Application(s) Both Nostrils two times a day  pantoprazole    Tablet 40 milliGRAM(s) Oral before breakfast  potassium chloride   Powder 40 milliEquivalent(s) Oral every 6 hours      Objective:  Vital Signs Last 24 Hrs  T(C): 36.7 (29 Sep 2023 05:04), Max: 37.1 (28 Sep 2023 19:52)  T(F): 98.1 (29 Sep 2023 05:04), Max: 98.7 (28 Sep 2023 19:52)  HR: 79 (29 Sep 2023 05:04) (65 - 90)  BP: 130/87 (29 Sep 2023 05:04) (118/82 - 149/90)  BP(mean): --  RR: 18 (29 Sep 2023 05:04) (18 - 18)  SpO2: 100% (29 Sep 2023 05:04) (91% - 100%)    Parameters below as of 29 Sep 2023 05:04  Patient On (Oxygen Delivery Method): nasal cannula        T(C): 36.7 (09-29-23 @ 05:04), Max: 37.5 (09-28-23 @ 08:05)  T(C): 36.7 (09-29-23 @ 05:04), Max: 37.5 (09-28-23 @ 08:05)  T(C): 36.7 (09-29-23 @ 05:04), Max: 37.5 (09-28-23 @ 08:05)    PHYSICAL EXAM:  HEENT: NC atraumatic  Neck: supple  Respiratory: no accessory muscle use, breathing comfortably  Cardiovascular: distant  Gastrointestinal: normal appearing, nondistended  Extremities: no clubbing, no cyanosis,        LABS:                          8.2    5.47  )-----------( 165      ( 29 Sep 2023 07:08 )             24.8       WBC  5.47 09-29 @ 07:08  7.11 09-28 @ 05:35  6.17 09-27 @ 20:28  6.34 09-27 @ 05:25  23.19 09-26 @ 04:37  21.65 09-26 @ 04:00  29.61 09-25 @ 14:14      09-29    143  |  106  |  12  ----------------------------<  115<H>  3.4<L>   |  30  |  0.98    Ca    8.4<L>      29 Sep 2023 07:08  Phos  2.8     09-29  Mg     1.9     09-29    TPro  4.9<L>  /  Alb  x   /  TBili  x   /  DBili  x   /  AST  x   /  ALT  x   /  AlkPhos  x   09-28      Creatinine: 0.98 mg/dL (09-29-23 @ 07:08)  Creatinine: 1.20 mg/dL (09-28-23 @ 05:35)  Creatinine: 1.30 mg/dL (09-27-23 @ 05:25)  Creatinine: 1.40 mg/dL (09-26-23 @ 04:37)  Creatinine: 1.20 mg/dL (09-25-23 @ 22:20)  Creatinine: 1.40 mg/dL (09-25-23 @ 14:14)      PTT - ( 27 Sep 2023 20:28 )  PTT:34.7 sec  Urinalysis Basic - ( 29 Sep 2023 07:08 )    Color: x / Appearance: x / SG: x / pH: x  Gluc: 115 mg/dL / Ketone: x  / Bili: x / Urobili: x   Blood: x / Protein: x / Nitrite: x   Leuk Esterase: x / RBC: x / WBC x   Sq Epi: x / Non Sq Epi: x / Bacteria: x            INFLAMMATORY MARKERS      MICROBIOLOGY:              RADIOLOGY & ADDITIONAL STUDIES:

## 2023-09-29 NOTE — PROGRESS NOTE ADULT - TIME BILLING
Pt seen + examined. Case discussed with resident. Agree with assessment and plan above (edited by me in detail):  Time spent: 55min. More than 50% of the visit was spent counseling the patient on medical condition and coordination of care , excluding teaching time.
Pt seen + examined. Case discussed with resident. Agree with assessment and plan above (edited by me in detail):  Time spent: 50min. More than 50% of the visit was spent counseling the patient and pt's wife and pt's PCP on medical condition -- severe lactic acidosis, severe sepsis due to suspected aspiration PNA, rectosigmoid colitis, and likely in part due to metformin-associated lactic acidosis in setting of LATOYA, Eliquis dosing, SDH.     72yo M with PMHx of DM2 on metformin with peripheral neuropathy, HTN, HLD A-fib (on Eliquis), TBI (s/p SDH with EVAC on 9/2021), and traumatic subdural hemorrhage in 10/2022 COVID PNA, and major depression presented to the ED with c/o vomiting and diarrhea a/w severe lactic acidosis, severe sepsis due to suspected aspiration PNA, rectosigmoid colitis, and likely in part due to metformin-associated lactic acidosis in setting of LATOYA.     - severe sepsis (POA) due to suspected aspiration PNA and rectosigmoid colitis   - very high lactate, leukocytosis, tachycardia on admission  - CT chest/abd/P shows: Bibasilar airspace opacities suspicious for infection.  Rectosigmoid colitis.  - Diabetic on Metformin. Lactate of 14 on admission. Suspect metformin-associated lactic acidosis contributing to the very high lactate especially in setting of LATOYA; rec against pt taking metformin in the future.   - Lactic acidosis resolved  - Leukocytosis now resolved  - GI PCR neg.  - BCx x2 NG @ 72h, UCx NG final  - MRSA/MSSA nares positive  - Will complete 5th day Bactroban in nares tomorrow  - treated with zosyn; now transitioned to Rocephin + Flagyl; will finish course tomorrow AM per ID  - ID Dr. Manning following, recs appreciated    - Patient on admission with a fib with RVR at rates 120-130s, at times ranging >170s in the setting of sepsis  - EKG: A fib with RVR 128bpm QTc 458 ms   - s/p Cardizem gtt  - C/w Cardizem PO  - now rate controlled  - No evidence of volume overload  - c/w Eliquis  - Normal TTE 8/15/23  - Cardio Dr. Agus yusuf following, recs appreciated    - On admission BUN/Cr: 24/1.4  - Baseline 0.7-0.8, per chart review, today 12/0.98  - Likely ATN 2/2 sepsis with component of GORDON  - Sodium Bicarb gtt d/c'd  - K+ repleted  - given pt is bedbound with very low muscle mass, his renal function is likely much more poor than appears on GFR / CrCl calculations for patients of this body weight   - rec against future use of metformin    - AAOx1 talks rarely 1-2 words at baseline; bedbound  - functional quadriplegia -- c/w nursing care for all ADLs  - Per pt's wife, Love - hx 2 SDH - first traumatic, second spontaneous while on Eliquis 5mg BID. Pt has been on 2.5mg BID at home prior to this admission  - pt's Eliquis had been increased to 5mg po BID earlier on this admission   - per pt's outpt cardiologist, Dr. 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 Main) consulted and since there was no worsening of SDH on CT Head today, he cleared pt to potentially use the full dose Eliquis  - after discussing risks/benefits with pt's wife and outpt physicians, decided to return to prior outpt dose of Eliquis 2.5mg po BID.    Disp:   - anticipate d/c to home with home care tomorrow  - CM arranged reinstating of pt's HHA and transportation
Pt seen + examined. Case discussed with resident. Agree with assessment and plan above (edited by me in detail):  Time spent: 50min. More than 50% of the visit was spent counseling the patient and pt's wife on medical condition -- severe lactic acidosis, severe sepsis due to suspected aspiration PNA, rectosigmoid colitis, and likely in part due to metformin toxicity in setting of LATOYA.     74yo M with PMHx of DM2 on metformin with peripheral neuropathy, HTN, HLD A-fib (on Eliquis), TBI (s/p SDH with EVAC on 9/2021), and traumatic subdural hemorrhage in 10/2022 COVID PNA, and major depression presented to the ED with c/o vomiting and diarrhea a/w severe lactic acidosis, severe sepsis due to suspected aspiration PNA, rectosigmoid colitis, and likely in part due to metformin toxicity in setting of LATOYA.     - severe sepsis (POA) due to suspected aspiration PNA and rectosigmoid colitis   - very high lactate, leukocytosis, tachycardia on admission  - CT chest/abd/P shows: Bibasilar airspace opacities suspicious for infection.  Rectosigmoid colitis.  - Diabetic on Metformin. Lactate of 14 on admission. Suspect metformin-associated lactic acidosis contributing to the very high lactate especially in setting of LATOYA; rec against pt taking metformin in the future.   - Lactate 0.8 today  - Leukocytosis now resolved  - GI PCR neg.  - f/u repeat CXR  - BCx x2 NG @ 48h, UCx NG final  - MRSA/MSSA nares positive, Bactroban for decolonization  - currently on zosyn covering colitis and most aspiration PNAs  - has not been on systemic MRSA coverage and improving clinically arguing against an MRSA PNA  - ID Dr. Manning consulted, will f/u recs    - Patient on admission with a fib with RVR at rates 120-130s, at times ranging >170s in the setting of sepsis  - EKG: A fib with RVR 128bpm QTc 458 ms   - s/p Cardizem gtt  - C/w Cardizem PO  - Hold Hydralazine per Cardio  - No evidence of volume overload  - C/w Eliquis PO  - Normal TTE 8/15/23  - Cardio Dr. Goldsmith group following, recs appreciated    - On admission BUN/Cr: 24/1.4  - Baseline 0.7-0.8, per chart review, today 20/1.3  - Likely ATN 2/2 sepsis   - consider Nephro eval  - Sodium Bicarb gtt d/c'd  - K+ repleted  - given pt is bedbound with very low muscle mass, his renal function is likely much more poor than appears on GFR / CrCl calculations for patients of this body weight   - rec against future use of metformin -- pt's wife understands and will no longer use that medication
Pt seen + examined. Case discussed with resident. Agree with assessment and plan above (edited by me in detail):  Time spent: 50min. Time spent on discussion with ICU team, consultants, pt and on critically ill pt care -- severe lactic acidosis, severe sepsis due to suspected aspiration PNA, rectosigmoid colitis, and likely in part due to metformin toxicity in setting of LATOYA.     74yo M with PMHx DM2 on metformin with peripheral neuropathy, HTN, HLD A-fib (on Eliquis), TBI (s/p SDH with EVAC on 9/2021), and traumatic subdural hemorrhage in 10/2022 Covid Pna, and major depression presented to the ED with c/o vomiting and diarrhea a/w severe lactic acidosis, severe sepsis due to suspected aspiration PNA, rectosigmoid colitis, and likely in part due to metformin toxicity in setting of LATOYA.     - severe sepsis (POA) due to suspected aspiration PNA and rectosigmoid colitis   - very high lactate, leukocytosis, tachycardia   - CT chest/abd/P shows: Bibasilar airspace opacities suspicious for infection.  Rectosigmoid colitis.  - CXR: No active infiltrates. Humeral lucencies requiring further evaluation neoplastic involvement is a consideration. Follow-up with bone scan and/or MR.  -  Diabetic on Metformin. Lactate of 14 on admission.  Suspect metformin-associated lactic acidosis contributing to the very high lactate especially in setting of LATOYA; rec against pt taking metformin in the future.   - Lactate down to 5.4 yesterday  - Leukocytosis now resolved, 6.34  - GI PCR neg.  - f/up Legionella urine Ag  - BCx NG @ 24h, UCx NG  - MRSA/MSSA nares positive, given Bactroban for decolonization  - consider ID eval  - decision on MRSA systemic coverage per ICU  - currently on zosyn covering colitis and most aspiration PNAs  - Care per ICU    - Patient on admission with a fib wit RVR  at rates 120-130's, at times ranges >170's in the setting of sepsis  - EKG: A fib with RVR 128bpm QTc 458 ms    - s/p Cardizem 10 mg IV X1 Zofran 4mg IV X1 zosyn x1 vanco x1  3200cc NS and Cardizem infusion 225ML/HR  in ER  - Transition Cardizem to PO  - No evidence of volume overload  - Transition Heparin GTT to Eliquis PO, cleared by S/S  - Had a normal TTE in 8/15/23.  No need to repeat  - Cardio following Dr Sepulveda, following, recs appreciated  - Care per ICU    - On admission  BUN/Cr: 24/1.4  - Baseline 0.6-0.8, per chart review, today 20/1.3  - Likely ATN 2/2 sepsis   - consider Nephro eval  - Sodium Bicarb gtt d/c'd  - K+ repleted  - given pt is bedbound with very low muscle mass, his renal function is likely much more poor than appears on GFR / CrCl calculations for patients of this body weight   - rec against future use of metformin  - Care per ICU

## 2023-09-29 NOTE — PROGRESS NOTE ADULT - PROBLEM SELECTOR PLAN 1
- severe sepsis (POA) due to suspected aspiration PNA and rectosigmoid colitis   - very high lactate, leukocytosis, tachycardia on admission  - CT chest/abd/P shows: Bibasilar airspace opacities suspicious for infection.  Rectosigmoid colitis.  - Diabetic on Metformin. Lactate of 14 on admission. Suspect metformin-associated lactic acidosis contributing to the very high lactate especially in setting of LATOYA; rec against pt taking metformin in the future.   - Lactic acidosis resolved  - Leukocytosis now resolved  - GI PCR neg.  - BCx x2 NG @ 72h, UCx NG final  - MRSA/MSSA nares positive  - Will complete 5th day Bactroban in nares tomorrow  - Completed Zosyn tx  - C/w Rocephin, Flagyl; will finish course tomorrow AM per ID  - ID Dr. Manning following - severe sepsis (POA) due to suspected aspiration PNA and rectosigmoid colitis   - very high lactate, leukocytosis, tachycardia on admission  - CT chest/abd/P shows: Bibasilar airspace opacities suspicious for infection.  Rectosigmoid colitis.  - Diabetic on Metformin. Lactate of 14 on admission. Suspect metformin-associated lactic acidosis contributing to the very high lactate especially in setting of LATOYA; rec against pt taking metformin in the future.   - Lactic acidosis resolved  - Leukocytosis now resolved  - GI PCR neg.  - BCx x2 NG @ 72h, UCx NG final  - MRSA/MSSA nares positive  - Will complete 5th day Bactroban in nares tomorrow  - treated with zosyn; now transitioned to Rocephin + Flagyl; will finish course tomorrow AM per ID  - ID Dr. Manning following, recs appreciated

## 2023-09-29 NOTE — PROGRESS NOTE ADULT - SUBJECTIVE AND OBJECTIVE BOX
neuro cons dict  seen for ams/clearance for full doses ac  small residual SDH  repeat ct head.  if repeat ct head-stable; increase apixaban to 5 mg bid

## 2023-09-29 NOTE — PROGRESS NOTE ADULT - SUBJECTIVE AND OBJECTIVE BOX
[facility] -- Alexey Briceno, Agus Oglesby Savella, , Con Lombardi  Office # [phone]    Follow Up:  Afib with RVR    Subjective/Observations: Awake and alert but non-verbal.  On NC at 3/min but not in any from of distress.  No complaints.  No overnight tele events.  Rates remained controlled    REVIEW OF SYSTEMS: All other review of systems is negative unless indicated above  PAST MEDICAL & SURGICAL HISTORY:  TBI (traumatic brain injury)  HTN (hypertension)  Atrial fibrillation  Peripheral neuropathy  Major depression  HLD (hyperlipidemia)  CAD (coronary artery disease)  BPH (benign prostatic hyperplasia)  Pneumonia due to COVID-19 virus  June 2022  Hemorrhage in the brain  H/O craniotomy    MEDICATIONS  (STANDING):  ammonium lactate 12% Lotion 1 Application(s) Topical two times a day  apixaban 5 milliGRAM(s) Oral every 12 hours  diltiazem    Tablet 60 milliGRAM(s) Oral every 6 hours  influenza  Vaccine (HIGH DOSE) 0.7 milliLiter(s) IntraMuscular once  latanoprost 0.005% Ophthalmic Solution 1 Drop(s) Both EYES at bedtime  modafinil 100 milliGRAM(s) Oral daily  mupirocin 2% Nasal 1 Application(s) Both Nostrils two times a day  pantoprazole    Tablet 40 milliGRAM(s) Oral before breakfast  piperacillin/tazobactam IVPB.. 3.375 Gram(s) IV Intermittent every 8 hours    MEDICATIONS  (PRN):    Allergies    No Known Allergies    Intolerances    Vital Signs Last 24 Hrs  T(C): 36.7 (29 Sep 2023 05:04), Max: 37.1 (28 Sep 2023 19:52)  T(F): 98.1 (29 Sep 2023 05:04), Max: 98.7 (28 Sep 2023 19:52)  HR: 79 (29 Sep 2023 05:04) (65 - 105)  BP: 130/87 (29 Sep 2023 05:04) (118/82 - 167/83)  BP(mean): 116 (28 Sep 2023 10:00) (116 - 116)  RR: 18 (29 Sep 2023 05:04) (15 - 18)  SpO2: 100% (29 Sep 2023 05:04) (91% - 100%)    Parameters below as of 29 Sep 2023 05:04  Patient On (Oxygen Delivery Method): nasal cannula    I&O's Summary    28 Sep 2023 07:01  -  29 Sep 2023 07:00  --------------------------------------------------------  IN: 120 mL / OUT: 400 mL / NET: -280 mL    PHYSICAL EXAM:  TELE: Afib  Constitutional: NAD, awake and alert, non-verbal, well-developed  HEENT: Moist Mucous Membranes, Anicteric  Pulmonary: Non-labored, breath sounds are clear bilaterally, No wheezing, rales or rhonchi  Cardiovascular: IRRR, S1 and S2, No murmurs, rubs, gallops or clicks  Gastrointestinal: Bowel Sounds present, soft, nontender.  Lymph: No peripheral edema. No lymphadenopathy.  Skin: No visible rashes or ulcers.  Psych:  Mood & affect: Flat  LABS: All Labs Reviewed:                        8.2    5.47  )-----------( 165      ( 29 Sep 2023 07:08 )             24.8                         8.4    7.11  )-----------( 184      ( 28 Sep 2023 05:35 )             25.2                         8.9    6.17  )-----------( 212      ( 27 Sep 2023 20:28 )             26.8     29 Sep 2023 07:08    143    |  106    |  12     ----------------------------<  115    3.4     |  30     |  0.98   28 Sep 2023 05:35    143    |  104    |  16     ----------------------------<  128    3.5     |  33     |  1.20   27 Sep 2023 05:25    141    |  102    |  20     ----------------------------<  109    3.2     |  29     |  1.30     Ca    8.4        29 Sep 2023 07:08  Ca    8.3        28 Sep 2023 05:35  Ca    8.4        27 Sep 2023 05:25  Phos  2.8       29 Sep 2023 07:08  Phos  2.8       28 Sep 2023 05:35  Phos  3.2       27 Sep 2023 05:25  Mg     1.9       29 Sep 2023 07:08  Mg     2.1       28 Sep 2023 05:35  Mg     2.2       27 Sep 2023 05:25    TPro  4.9    /  Alb  x      /  TBili  x      /  DBili  x      /  AST  x      /  ALT  x      /  AlkPhos  x      28 Sep 2023 05:35    PTT - ( 27 Sep 2023 20:28 )  PTT:34.7 sec    12 Lead ECG:   Ventricular Rate 128 BPM    Atrial Rate 117 BPM    QRS Duration 56 ms    Q-T Interval 314 ms    QTC Calculation(Bazett) 458 ms    R Axis 246 degrees    T Axis 102 degrees    Diagnosis Line Atrial fibrillation with rapid ventricular response  Low voltage QRS  Inferior infarct (cited on or before 22-MAY-2022)  Anterolateral infarct (cited on or before 22-MAY-2022)    Confirmed by TOMMY OGLESBY ([id number]) on 9/25/2023 3:52:55 PM (09-25-23 @ 14:43)    TRANSTHORACIC ECHOCARDIOGRAM REPORT  ________________________________________________________________________________                                      _______    Pt. Name:       MELBA PARMAR Study Date:    8/14/2023  MRN:            [id number]       YOB: 1950  Accession #:    [id number]      Age:           73 years  Account#:       [id number]     Gender:        M  Heart Rate:                    Height:        69.00 in (175.26 cm)  Rhythm:                        Weight:  134.00 lb (60.78 kg)  Blood Pressure: 120/74 mmHg    BSA/BMI:       1.74 m² / 19.79 kg/m²  ________________________________________________________________________________________  Referring Physician:    [id number] Ranulfo Boggs  Interpreting Physician: Lexi Seals  Primary Sonographer:    [facility]    CPT:               ECHO TTE WO CON COMP W DOPP - 42363.m  Indication(s):     Shock, unspecified - R57.9  Procedure:         Transthoracic echocardiogram with 2-D, M-mode and complete                     spectral and color flow Doppler.  Ordering Location: ICU1  Study Information: Image quality for this study is technically difficult.  ______________________________________________________________________________________   CONCLUSIONS:      1. Technically difficult image quality.   2. Left ventricular systolic function is normal with an ejection fraction visually estimated at 60 to 65 %.   3. The right ventricle is not well visualized.   4. The left atrium is mildly dilated in size.   5. Aortic valve was not well visualized.   6. Trace mitral regurgitation.   7. Trace tricuspid regurgitation.  ________________________________________________________________________________________  FINDINGS:     Left Ventricle:  Left ventricular systolic function is normal with an ejection fraction visually estimated at 60 to 65%.     Right Ventricle:  The right ventricle is not well visualized. Normal wall thickness.     Left Atrium:  The left atrium is mildly dilated in size.     Right Atrium:  The right atrium is normal in size.     Aortic Valve:  The aortic valve was not well visualized.     Mitral Valve:  There is trace mitral regurgitation.     Tricuspid Valve:  There is trace tricuspid regurgitation. Estimated pulmonary artery systolic pressure is 29 mmHg.     Pulmonic Valve:  The pulmonic valve was not well visualized.     Pericardium:  No pericardial effusion seen.     Systemic Veins:  The inferior vena cava is normal in size (normal <2.1cm) with normal inspiratory collapse (normal >50%) consistent with normal right atrial pressure (~3, range 0-5mmHg).  ____________________________________________________________________  Quantitative Data:  Left Ventricle Measurements: (Indexed to BSA)     IVSd (2D):   1.2 cm  LVPWd (2D):  1.2 cm  LVIDd (2D):  4.1 cm  LVIDs (2D):  2.6 cm  LV Mass:     167 g  96.0 g/m²  Visualized LV EF%: 60 to 65%     MV E Vmax:    0.73 m/s  e' lateral:   12.00 cm/s  e' medial:    8.49 cm/s  E/e' lateral: 6.12  E/e' medial:  8.65  E/e' Average: 7.16  MV DT:        120 msec    Left Atrium Measurements: (Indexed to BSA)  LA Diam 2D: 4.10 cm    LVOT / RVOT/ Qp/Qs Data: (Indexed to BSA)  LVOT Diameter: 2.00 cm    Mitral Valve Measurements:     MV E Vmax: 0.7 m/s     Tricuspid Valve Measurements:     TR Vmax:          2.5 m/s  TR Peak Gradient: 25.6 mmHg  RA Pressure:      3 mmHg  PASP:             29 mmHg    ________________________________________________________________________________________  Electronically signed on8/15/2023 at 12:43:28 PM by Lexi Seals         *** Final ***

## 2023-09-29 NOTE — PROGRESS NOTE ADULT - ASSESSMENT
74 y/o M with PMHx T2DM on metformin with peripheral neuropathy, HTN, HLD A-fib (on Eliquis), TBI (s/p SDH with EVAC on 9/2021), and traumatic subdural hemorrhage in 10/2022 Covid Pna, and major depression presented to the ED with c/o vomiting and diarrhea. Pt started vomiting non stop with liquid BM for at least 1 1/2 hrs. Pt is at home, he has home aide 10/hr per day, bedbound at baseline. In ER, pt found to be in sepsis with lactate of 14 and in rapid Afib. Initially admitted to ICU with concern for sepsis with asp PNA vs colitis in differential.    RECOMMENDATIONS  1-SEPSIS pt started on Zosyn 9/25 afternoon, micro all NGTD and pt is MRSA PCR+ but improved without MRSA coverage. Currently with nl wbc, normothermic,   9/29-will dc abx and obs off abx    From an ID standpoint no further requirement for inpatient status for the management of ID issues. Fine with discharge from ID standpoint when other medical issues no longer require inpatient care and social issues allow for a safe discharge plan. To schedule an outpatient ID follow up appointment please call our office at [phone]      Thank you for consulting us and involving us in the management of this most interesting and challenging case.  We will follow along in the care of this patient. Please call us at [phone] or text me directly on my cell# at [phone] with any concerns.   74 y/o M with PMHx T2DM on metformin with peripheral neuropathy, HTN, HLD A-fib (on Eliquis), TBI (s/p SDH with EVAC on 9/2021), and traumatic subdural hemorrhage in 10/2022 Covid Pna, and major depression presented to the ED with c/o vomiting and diarrhea. Pt started vomiting non stop with liquid BM for at least 1 1/2 hrs. Pt is at home, he has home aide 10/hr per day, bedbound at baseline. In ER, pt found to be in sepsis with lactate of 14 and in rapid Afib. Initially admitted to ICU with concern for sepsis with asp PNA vs colitis in differential.    RECOMMENDATIONS  1-SEPSIS pt started on Zosyn 9/25 afternoon, micro all NGTD and pt is MRSA PCR+ but improved without MRSA coverage. Currently with nl wbc, normothermic,   9/29-fine to complete abx 9/30 as last day so will change to ceftriaxone today with last dose 9/30 am and metronidazole 500mg PO TID with last dose 9/30 am    From an ID standpoint no further requirement for inpatient status for the management of ID issues. Fine with discharge from ID standpoint when other medical issues no longer require inpatient care and social issues allow for a safe discharge plan. To schedule an outpatient ID follow up appointment please call our office at [phone]      Thank you for consulting us and involving us in the management of this most interesting and challenging case.  We will follow along in the care of this patient. Please call us at [phone] or text me directly on my cell# at [phone] with any concerns.

## 2023-09-29 NOTE — PROGRESS NOTE ADULT - ASSESSMENT
74yo M with PMHx of DM2 on metformin with peripheral neuropathy, HTN, HLD A-fib (on Eliquis), TBI (s/p SDH with EVAC on 9/2021), and traumatic subdural hemorrhage in 10/2022 COVID PNA, and major depression presented to the ED with c/o vomiting and diarrhea a/w severe lactic acidosis, severe sepsis due to suspected aspiration PNA, rectosigmoid colitis, and likely in part due to metformin toxicity in setting of LATOYA.  74yo M with PMHx of DM2 on metformin with peripheral neuropathy, HTN, HLD A-fib (on Eliquis), TBI (s/p SDH with EVAC on 9/2021), and traumatic subdural hemorrhage in 10/2022 COVID PNA, and major depression presented to the ED with c/o vomiting and diarrhea a/w severe lactic acidosis, severe sepsis due to suspected aspiration PNA, rectosigmoid colitis, and likely in part due to metformin-associated lactic acidosis in setting of LATOYA.

## 2023-09-29 NOTE — PROGRESS NOTE ADULT - PROBLEM SELECTOR PLAN 2
- Patient on admission with a fib with RVR at rates 120-130s, at times ranging >170s in the setting of sepsis  - EKG: A fib with RVR 128bpm QTc 458 ms   - s/p Cardizem gtt  - C/w Cardizem PO  - Hold Hydralazine per Cardio  - No evidence of volume overload  - C/w Eliquis PO  - Normal TTE 8/15/23  - Cardio Dr. Goldsmith group following, recs appreciated - Patient on admission with a fib with RVR at rates 120-130s, at times ranging >170s in the setting of sepsis  - EKG: A fib with RVR 128bpm QTc 458 ms   - s/p Cardizem gtt  - C/w Cardizem PO  - now rate controlled  - No evidence of volume overload  - c/w Eliquis  - Normal TTE 8/15/23  - Cardio Dr. Goldsmith group following, recs appreciated

## 2023-09-29 NOTE — PROGRESS NOTE ADULT - PROBLEM SELECTOR PROBLEM 7
Abnormal finding on imaging

## 2023-09-29 NOTE — PROGRESS NOTE ADULT - PROBLEM SELECTOR PLAN 6
-TBI (s/p SDH with EVAC on 9/2021), and traumatic subdural hemorrhage in 10/2022   -at baseline   -AAOx1 talks occasionally 1 -2 words at baseline; bedbound  -functional quadriplegia -- c/w nursing care for all ADLs  -Per wife Love - hx 2 SDH - first traumatic, second spontaneous while on Eliquis 5mg BID. Pt has been on 2.5mg BID at home prior to this admission  - F/u CT Head repeat; if stable, c/w Eliquis 5mg BID per Neuro  - Neuro Dr. Main following, recs appreciated -TBI (s/p SDH with EVAC on 9/2021), and traumatic subdural hemorrhage in 10/2022   -at baseline   -AAOx1 talks occasionally 1 -2 words at baseline; bedbound  -functional quadriplegia -- c/w nursing care for all ADLs  -Per wife Love - hx 2 SDH - first traumatic, second spontaneous while on Eliquis 5mg BID. Pt has been on 2.5mg BID at home prior to this admission  - Given hx of both DVT as well as spontaneous SDH, will c/w and d/c on Eliquis 2.5mg BID  - Neuro Dr. Main following, recs appreciated - TBI (s/p SDH with EVAC on 9/2021), and traumatic subdural hemorrhage in 10/2022   - at baseline   - AAOx1 talks rarely 1-2 words at baseline; bedbound  - functional quadriplegia -- c/w nursing care for all ADLs  - Per pt's wife, Love - hx 2 SDH - first traumatic, second spontaneous while on Eliquis 5mg BID. Pt has been on 2.5mg BID at home prior to this admission  - pt's Eliquis had been increased to 5mg po BID earlier on this admission   - per pt's outpt cardiologist, Dr. 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 Main) consulted and since there was no worsening of SDH on CT Head today, he cleared pt to potentially use the full dose Eliquis  - after discussing risks/benefits with pt's wife and outpt physicians, decided to return to prior outpt dose of Eliquis 2.5mg po BID.

## 2023-09-29 NOTE — PROGRESS NOTE ADULT - ATTENDING COMMENTS
74 yo man with Hx TBI from brain bleed, afib, CAD, presenting with nausea, vomiting, diarrhea, found to be in uncontrolled afib, with severe sepsis from rectosigmoid colitis resulting in severe lactic acidosis, metabolic encephalopathy.    --metabolic encephalopathy improving throughout the day, in afternoon responsive to voice  hold remeron for now, but ok to restart modafenil if able to take PO  --afib controlled on dilt gtt, AC with heparin gtt  transition to PO dilt when able  --respiratory status stable on NC  clinically doubt aspiration pneumonia  --CKD stable, d/c bicarb gtt  lactic acidosis improving, likely multifactorial from hypoperfusion +/- metformin  --rectosigmoid colitis, infectious v ischemic  check GI PCR, Cdiff  NPO for now, continue IVF  dysphagia eval when able to take PO  once improved will need to establish bowel regimen at home for chronic constipation  --continue zosyn for colitis +/- pneumonia pending Cx data  --plan discussed with wife and son        Admit to ICU  metabolic encephalopathy, supportive care  respiratory status stable  uncontrolled afib, continue dilt gtt, may need additional agent  CKD at baseline  severe lactic acidosis, trend, suspect sepsis/hypoperfusion rather than metformin inducted  NPO for now  unclear source of sepsis, check CT c/a/p, start zosyn, f/u panCx  plan discussed with wife.
72 yo man with Hx TBI from brain bleed, afib, CAD, presenting with nausea, vomiting, diarrhea, found to be in uncontrolled afib, with severe sepsis from rectosigmoid colitis resulting in severe lactic acidosis, metabolic encephalopathy.  Much improved.      --likely hypoactive delirium this am, per wife has similar instances at home  restart home modafinil, hold remeron and neurontin until more alert  --afib, increase dilt to q6h  AC with eliquis  --respiratory status stable on NC  --CKD 3 stable  --rectosigmoid colitis, infectious v ischemic, workup thus far unrevealing, and now diarrhea resolved  tolerating dysphagia diet  chronic constipation, start bowel regimen with miralax and escalate prn, will need specific instructions at home  --continue zosyn for colitis, today day 4  --DM2 controlled off meds, recommend discontinuing metformin given likelihood that this is part of lactic acidosis on current and prior admissions  --humerus abnormalities on CXR, f/u SPEP, UPEP, may need CT
72 yo man with Hx TBI from brain bleed, afib, CAD, presenting with nausea, vomiting, diarrhea, found to be in uncontrolled afib, with severe sepsis from rectosigmoid colitis resulting in severe lactic acidosis, metabolic encephalopathy.  Much improved.      --metabolic encephalopathy improving  restart home remeron and modafinil  --afib controlled on dilt gtt, AC with heparin gtt  when able to take PO transition to PO diltiazem and eliquis  --respiratory status stable on NC  clinically doubt aspiration pneumonia  --CKD 3 stable, d/c IVF  lactic acidosis improving, likely multifactorial from hypoperfusion +/- metformin  --rectosigmoid colitis, infectious v ischemic  workup thus far unrevealing, and now diarrhea resolved  ok to restart dysphagia diet  chronic constipation, start bowel regimen with miralax and escalate prn  --continue zosyn for colitis pending Cx data  --DM2 controlled off meds, recommend discontinuing metformin given likelihood that this is part of lactic acidosis on current and prior admissions  --humerus abnormalities on CXR, check SPEP, UPEP, may need CT  --plan discussed with wife
see below
see below
Agree with above, edited where appropriate.
see below

## 2023-09-29 NOTE — DISCHARGE NOTE NURSING/CASE MANAGEMENT/SOCIAL WORK - NSDCPEFALRISK_GEN_ALL_CORE
For information on Fall & Injury Prevention, visit: [email or web address] OR  [email or web address] OR  https://www.cdc.gov/steadi/patient.html

## 2023-09-29 NOTE — PROGRESS NOTE ADULT - ASSESSMENT
72 y/o M with CAD, Afib, HTN, HLD, BPH, peripheral neuropathy, SDH s/p craniotomy, Covid Pna, and major depression presented to the ED c/o vomiting and diarrhea, found to be in sepsis and rapid Afib.    Rapid Afib/Sepsis  - With known Hx of Afib and on Eliquis half a dose in the setting of SDH  - s/p Cardizem gtt.  Now on 60 mg q12H.  Rates have been controlled  - If needed, can increase to 90mg q6  - Eliquis resumed and now on full dose.  Please monitor closely has had hx of recurrent spontaneous SDH.  He also has had significant DVT's off of AC.   - Recommend Neuro input to weigh in on risk of full dose AC at this point.      - No clear volume overload.  On NC kathy 3L/min.  Downtitrate as tolerated  - Had TTE which was tls but overall preserved EF in 8/15/23.  No need to repeat    - No acute ischemia  - Presumed CAD on the basis of inferior q waves, but no definite CAD  - Would resume statin when able    - BP stable and controlled but would hold home Hydralazine for now to allow room for AVN uptitration  - Monitor electrolytes, replete to keep K>4 and Mag>2   - Will continue to follow    Allison Perez DNP, NP-C, AGACNP-C  Cardiology   Call TEAMS            74 y/o M with CAD, Afib, HTN, HLD, BPH, peripheral neuropathy, SDH s/p craniotomy, Covid Pna, and major depression presented to the ED c/o vomiting and diarrhea, found to be in sepsis and rapid Afib.    Rapid Afib/Sepsis  - With known Hx of Afib and on Eliquis half a dose in the setting of SDH  - s/p Cardizem gtt.  Now on 60 mg q12H.  Rates have been controlled  - If needed, can increase to 90mg q6  - Eliquis resumed and now on full dose.  Please monitor closely has had hx of recurrent spontaneous SDH.  He also has had significant DVT's off of AC.   - Recommend Neuro input to weigh in on risk of full dose AC at this point.  Discussed with Dr. Main, will repeat CT head.  Pending result, decision to be made re: full vs half dose Eliquis    - No clear volume overload.  On NC kathy 3L/min.  Downtitrate as tolerated  - Had TTE which was tls but overall preserved EF in 8/15/23.  No need to repeat    - No acute ischemia  - Presumed CAD on the basis of inferior q waves, but no definite CAD  - Would resume statin when able    - BP stable and controlled but would hold home Hydralazine for now to allow room for AVN uptitration  - Monitor electrolytes, replete to keep K>4 and Mag>2   - Will continue to follow    Allison Perez DNP, NP-C, AGACNP-C  Cardiology   Call TEAMS            72 y/o M with CAD, Afib, HTN, HLD, BPH, peripheral neuropathy, SDH s/p craniotomy, Covid Pna, and major depression presented to the ED c/o vomiting and diarrhea, found to be in sepsis and rapid Afib.    Rapid Afib/Sepsis  - With known Hx of Afib and on Eliquis half a dose in the setting of SDH  - s/p Cardizem gtt.  Now on 60 mg q12H.  Rates have been controlled  - If needed, can increase to 90mg q6  - Eliquis resumed and now on full dose.  Please monitor closely has had hx of recurrent spontaneous SDH.  He also has had significant DVT's off of AC.   - Recommend Neuro input to weigh in on risk of full dose AC at this point.  Discussed with Dr. Main, will repeat CT head.  Pending result, decision to be made re: full vs half dose Eliquis    - No clear volume overload.  On NC kathy 3L/min.  Down titrate as tolerated  - Had TTE which was tls but overall preserved EF in 8/15/23.  No need to repeat    - No acute ischemia  - Presumed CAD on the basis of inferior q waves, but no definite CAD  - Would resume statin when able    - BP stable and controlled but would hold home Hydralazine for now to allow room for AVN uptitration  - Monitor electrolytes, replete to keep K>4 and Mag>2   - Will continue to follow    Allison Perez DNP, NP-C, AGACNP-C  Cardiology   Call TEAMS

## 2023-09-29 NOTE — PROGRESS NOTE ADULT - NUTRITIONAL ASSESSMENT
This patient has been assessed with a concern for Malnutrition and has been determined to have a diagnosis/diagnoses of Moderate protein-calorie malnutrition.    This patient is being managed with:   Diet Soft and Bite Sized-  Entered: Sep 27 2023  1:03PM  

## 2023-09-29 NOTE — PROGRESS NOTE ADULT - NS ATTEND AMEND GEN_ALL_CORE FT
af, now with better rate control  cont ccb  started on ac, though history of subdural  neuro to weigh in  no sign of volume overload

## 2023-09-29 NOTE — PROGRESS NOTE ADULT - SUBJECTIVE AND OBJECTIVE BOX
Patient is a 73y old  Male who presents with a chief complaint of A- fib with RVR and lactic acidosis (29 Sep 2023 12:04)      INTERVAL HPI/OVERNIGHT EVENTS: No acute overnight events. Pt was seen and examined at bedside, accompanied by wife. History limited d/t pt nonverbal status at baseline.     MEDICATIONS  (STANDING):  ammonium lactate 12% Lotion 1 Application(s) Topical two times a day  apixaban 5 milliGRAM(s) Oral every 12 hours  diltiazem    Tablet 60 milliGRAM(s) Oral every 6 hours  influenza  Vaccine (HIGH DOSE) 0.7 milliLiter(s) IntraMuscular once  latanoprost 0.005% Ophthalmic Solution 1 Drop(s) Both EYES at bedtime  metroNIDAZOLE  IVPB 500 milliGRAM(s) IV Intermittent every 8 hours  modafinil 100 milliGRAM(s) Oral daily  mupirocin 2% Nasal 1 Application(s) Both Nostrils two times a day  pantoprazole    Tablet 40 milliGRAM(s) Oral before breakfast  potassium chloride   Powder 40 milliEquivalent(s) Oral every 6 hours    MEDICATIONS  (PRN):      Allergies    No Known Allergies    Intolerances        REVIEW OF SYSTEMS:  limited dt nonverbal status at baseline    Vital Signs Last 24 Hrs  T(C): 37.2 (29 Sep 2023 12:12), Max: 37.2 (29 Sep 2023 12:12)  T(F): 99 (29 Sep 2023 12:12), Max: 99 (29 Sep 2023 12:12)  HR: 92 (29 Sep 2023 12:12) (65 - 92)  BP: 164/107 (29 Sep 2023 12:12) (118/82 - 164/107)  BP(mean): --  RR: 18 (29 Sep 2023 12:12) (18 - 18)  SpO2: 99% (29 Sep 2023 12:12) (91% - 100%)    Parameters below as of 29 Sep 2023 12:12  Patient On (Oxygen Delivery Method): nasal cannula        PHYSICAL EXAM:  GENERAL: NAD, cachectic  HEENT:  anicteric, moist mucous membranes  CHEST/LUNG:  Auscultation limited dt pt questionable understanding, inability to follow directions for deep inspiration. CTA B/L.  HEART:  RRR, S1, S2  ABDOMEN:  BS+, soft, nontender, nondistended  EXTREMITIES: B/L UE flexion contracture with increased tone. B/L LE normal tone. no edema, cyanosis, or calf tenderness  NERVOUS SYSTEM: Nonverbal at baseline. Turns eyes to voice.    LABS:                        8.2    5.47  )-----------( 165      ( 29 Sep 2023 07:08 )             24.8     CBC Full  -  ( 29 Sep 2023 07:08 )  WBC Count : 5.47 K/uL  Hemoglobin : 8.2 g/dL  Hematocrit : 24.8 %  Platelet Count - Automated : 165 K/uL  Mean Cell Volume : 78.2 fl  Mean Cell Hemoglobin : 25.9 pg  Mean Cell Hemoglobin Concentration : 33.1 gm/dL  Auto Neutrophil # : 3.91 K/uL  Auto Lymphocyte # : 0.95 K/uL  Auto Monocyte # : 0.47 K/uL  Auto Eosinophil # : 0.09 K/uL  Auto Basophil # : 0.02 K/uL  Auto Neutrophil % : 71.5 %  Auto Lymphocyte % : 17.4 %  Auto Monocyte % : 8.6 %  Auto Eosinophil % : 1.6 %  Auto Basophil % : 0.4 %    29 Sep 2023 07:08    143    |  106    |  12     ----------------------------<  115    3.4     |  30     |  0.98     Ca    8.4        29 Sep 2023 07:08  Phos  2.8       29 Sep 2023 07:08  Mg     1.9       29 Sep 2023 07:08      PTT - ( 27 Sep 2023 20:28 )  PTT:34.7 sec  Urinalysis Basic - ( 29 Sep 2023 07:08 )    Color: x / Appearance: x / SG: x / pH: x  Gluc: 115 mg/dL / Ketone: x  / Bili: x / Urobili: x   Blood: x / Protein: x / Nitrite: x   Leuk Esterase: x / RBC: x / WBC x   Sq Epi: x / Non Sq Epi: x / Bacteria: x      CAPILLARY BLOOD GLUCOSE            Culture - Blood (collected 09-25-23 @ 16:43)  Source: .Blood Blood-Peripheral  Preliminary Report (09-28-23 @ 22:01):    No growth at 72 Hours    Culture - Urine (collected 09-25-23 @ 16:05)  Source: Clean Catch Clean Catch (Midstream)  Final Report (09-26-23 @ 22:51):    No growth    Culture - Blood (collected 09-25-23 @ 14:20)  Source: .Blood Blood-Peripheral  Preliminary Report (09-28-23 @ 22:01):    No growth at 72 Hours        RADIOLOGY & ADDITIONAL TESTS: ____    Personally reviewed.     Consultant(s) Notes Reviewed:  [x] YES  [ ] NO     Patient is a 73y old  Male who presents with a chief complaint of vomiting and diarrhea.      INTERVAL HPI/OVERNIGHT EVENTS: No acute overnight events. Pt was seen and examined at bedside, accompanied by wife. Cannot obtain ROS due to nonverbal status at baseline.    MEDICATIONS  (STANDING):  ammonium lactate 12% Lotion 1 Application(s) Topical two times a day  apixaban 5 milliGRAM(s) Oral every 12 hours  diltiazem    Tablet 60 milliGRAM(s) Oral every 6 hours  influenza  Vaccine (HIGH DOSE) 0.7 milliLiter(s) IntraMuscular once  latanoprost 0.005% Ophthalmic Solution 1 Drop(s) Both EYES at bedtime  metroNIDAZOLE  IVPB 500 milliGRAM(s) IV Intermittent every 8 hours  modafinil 100 milliGRAM(s) Oral daily  mupirocin 2% Nasal 1 Application(s) Both Nostrils two times a day  pantoprazole    Tablet 40 milliGRAM(s) Oral before breakfast  potassium chloride   Powder 40 milliEquivalent(s) Oral every 6 hours    MEDICATIONS  (PRN):      Allergies    No Known Allergies    Intolerances        REVIEW OF SYSTEMS:  Cannot obtain due to nonverbal status at baseline.    Vital Signs Last 24 Hrs  T(C): 37.2 (29 Sep 2023 12:12), Max: 37.2 (29 Sep 2023 12:12)  T(F): 99 (29 Sep 2023 12:12), Max: 99 (29 Sep 2023 12:12)  HR: 92 (29 Sep 2023 12:12) (65 - 92)  BP: 164/107 (29 Sep 2023 12:12) (118/82 - 164/107)  BP(mean): --  RR: 18 (29 Sep 2023 12:12) (18 - 18)  SpO2: 99% (29 Sep 2023 12:12) (91% - 100%)    Parameters below as of 29 Sep 2023 12:12  Patient On (Oxygen Delivery Method): nasal cannula        PHYSICAL EXAM:  GENERAL: NAD, cachectic  HEENT:  anicteric, moist mucous membranes  CHEST/LUNG:  Auscultation limited due to pt questionable understanding, inability to follow directions for deep inspiration. decreased breath sounds, grossly CTA b/l  HEART:  irregular at 76bpm, S1, S2  ABDOMEN:  BS+, soft, nontender, nondistended  EXTREMITIES: B/L UE flexion contracture with increased tone. B/L LE muscle atrophy. no edema, cyanosis, or calf tenderness  NERVOUS SYSTEM: Nonverbal at baseline. Turns eyes to voice.    LABS:                        8.2    5.47  )-----------( 165      ( 29 Sep 2023 07:08 )             24.8     CBC Full  -  ( 29 Sep 2023 07:08 )  WBC Count : 5.47 K/uL  Hemoglobin : 8.2 g/dL  Hematocrit : 24.8 %  Platelet Count - Automated : 165 K/uL  Mean Cell Volume : 78.2 fl  Mean Cell Hemoglobin : 25.9 pg  Mean Cell Hemoglobin Concentration : 33.1 gm/dL  Auto Neutrophil # : 3.91 K/uL  Auto Lymphocyte # : 0.95 K/uL  Auto Monocyte # : 0.47 K/uL  Auto Eosinophil # : 0.09 K/uL  Auto Basophil # : 0.02 K/uL  Auto Neutrophil % : 71.5 %  Auto Lymphocyte % : 17.4 %  Auto Monocyte % : 8.6 %  Auto Eosinophil % : 1.6 %  Auto Basophil % : 0.4 %    29 Sep 2023 07:08    143    |  106    |  12     ----------------------------<  115    3.4     |  30     |  0.98     Ca    8.4        29 Sep 2023 07:08  Phos  2.8       29 Sep 2023 07:08  Mg     1.9       29 Sep 2023 07:08      PTT - ( 27 Sep 2023 20:28 )  PTT:34.7 sec  Urinalysis Basic - ( 29 Sep 2023 07:08 )    Color: x / Appearance: x / SG: x / pH: x  Gluc: 115 mg/dL / Ketone: x  / Bili: x / Urobili: x   Blood: x / Protein: x / Nitrite: x   Leuk Esterase: x / RBC: x / WBC x   Sq Epi: x / Non Sq Epi: x / Bacteria: x      CAPILLARY BLOOD GLUCOSE            Culture - Blood (collected 09-25-23 @ 16:43)  Source: .Blood Blood-Peripheral  Preliminary Report (09-28-23 @ 22:01):    No growth at 72 Hours    Culture - Urine (collected 09-25-23 @ 16:05)  Source: Clean Catch Clean Catch (Midstream)  Final Report (09-26-23 @ 22:51):    No growth    Culture - Blood (collected 09-25-23 @ 14:20)  Source: .Blood Blood-Peripheral  Preliminary Report (09-28-23 @ 22:01):    No growth at 72 Hours        RADIOLOGY & ADDITIONAL TESTS: ____    Personally reviewed.     Consultant(s) Notes Reviewed:  [x] YES  [ ] NO

## 2023-09-30 VITALS
SYSTOLIC BLOOD PRESSURE: 165 MMHG | OXYGEN SATURATION: 92 % | TEMPERATURE: 98 F | DIASTOLIC BLOOD PRESSURE: 88 MMHG | HEART RATE: 77 BPM | RESPIRATION RATE: 18 BRPM

## 2023-09-30 LAB
ALBUMIN SERPL ELPH-MCNC: 2.9 G/DL — LOW (ref 3.3–5)
ALP SERPL-CCNC: 79 U/L — SIGNIFICANT CHANGE UP (ref 40–120)
ALT FLD-CCNC: 15 U/L — SIGNIFICANT CHANGE UP (ref 12–78)
ANION GAP SERPL CALC-SCNC: 8 MMOL/L — SIGNIFICANT CHANGE UP (ref 5–17)
AST SERPL-CCNC: 10 U/L — LOW (ref 15–37)
BASOPHILS # BLD AUTO: 0.04 K/UL — SIGNIFICANT CHANGE UP (ref 0–0.2)
BASOPHILS NFR BLD AUTO: 0.5 % — SIGNIFICANT CHANGE UP (ref 0–2)
BILIRUB SERPL-MCNC: 0.4 MG/DL — SIGNIFICANT CHANGE UP (ref 0.2–1.2)
BUN SERPL-MCNC: 11 MG/DL — SIGNIFICANT CHANGE UP (ref 7–23)
CALCIUM SERPL-MCNC: 8.9 MG/DL — SIGNIFICANT CHANGE UP (ref 8.5–10.1)
CHLORIDE SERPL-SCNC: 107 MMOL/L — SIGNIFICANT CHANGE UP (ref 96–108)
CO2 SERPL-SCNC: 27 MMOL/L — SIGNIFICANT CHANGE UP (ref 22–31)
CREAT SERPL-MCNC: 0.88 MG/DL — SIGNIFICANT CHANGE UP (ref 0.5–1.3)
CULTURE RESULTS: SIGNIFICANT CHANGE UP
CULTURE RESULTS: SIGNIFICANT CHANGE UP
EGFR: 92 ML/MIN/1.73M2 — SIGNIFICANT CHANGE UP
EOSINOPHIL # BLD AUTO: 0.06 K/UL — SIGNIFICANT CHANGE UP (ref 0–0.5)
EOSINOPHIL NFR BLD AUTO: 0.8 % — SIGNIFICANT CHANGE UP (ref 0–6)
GLUCOSE SERPL-MCNC: 123 MG/DL — HIGH (ref 70–99)
HCT VFR BLD CALC: 31.9 % — LOW (ref 39–50)
HGB BLD-MCNC: 10.3 G/DL — LOW (ref 13–17)
IMM GRANULOCYTES NFR BLD AUTO: 1.4 % — HIGH (ref 0–0.9)
LYMPHOCYTES # BLD AUTO: 1.03 K/UL — SIGNIFICANT CHANGE UP (ref 1–3.3)
LYMPHOCYTES # BLD AUTO: 14 % — SIGNIFICANT CHANGE UP (ref 13–44)
MAGNESIUM SERPL-MCNC: 1.8 MG/DL — SIGNIFICANT CHANGE UP (ref 1.6–2.6)
MCHC RBC-ENTMCNC: 25.6 PG — LOW (ref 27–34)
MCHC RBC-ENTMCNC: 32.3 GM/DL — SIGNIFICANT CHANGE UP (ref 32–36)
MCV RBC AUTO: 79.2 FL — LOW (ref 80–100)
MONOCYTES # BLD AUTO: 0.68 K/UL — SIGNIFICANT CHANGE UP (ref 0–0.9)
MONOCYTES NFR BLD AUTO: 9.3 % — SIGNIFICANT CHANGE UP (ref 2–14)
NEUTROPHILS # BLD AUTO: 5.43 K/UL — SIGNIFICANT CHANGE UP (ref 1.8–7.4)
NEUTROPHILS NFR BLD AUTO: 74 % — SIGNIFICANT CHANGE UP (ref 43–77)
NRBC # BLD: 0 /100 WBCS — SIGNIFICANT CHANGE UP (ref 0–0)
PHOSPHATE SERPL-MCNC: 2.6 MG/DL — SIGNIFICANT CHANGE UP (ref 2.5–4.5)
PLATELET # BLD AUTO: 214 K/UL — SIGNIFICANT CHANGE UP (ref 150–400)
POTASSIUM SERPL-MCNC: 3.4 MMOL/L — LOW (ref 3.5–5.3)
POTASSIUM SERPL-SCNC: 3.4 MMOL/L — LOW (ref 3.5–5.3)
PROT SERPL-MCNC: 6.3 G/DL — SIGNIFICANT CHANGE UP (ref 6–8.3)
RBC # BLD: 4.03 M/UL — LOW (ref 4.2–5.8)
RBC # FLD: 20.4 % — HIGH (ref 10.3–14.5)
SODIUM SERPL-SCNC: 142 MMOL/L — SIGNIFICANT CHANGE UP (ref 135–145)
SPECIMEN SOURCE: SIGNIFICANT CHANGE UP
SPECIMEN SOURCE: SIGNIFICANT CHANGE UP
WBC # BLD: 7.34 K/UL — SIGNIFICANT CHANGE UP (ref 3.8–10.5)
WBC # FLD AUTO: 7.34 K/UL — SIGNIFICANT CHANGE UP (ref 3.8–10.5)

## 2023-09-30 PROCEDURE — 36600 WITHDRAWAL OF ARTERIAL BLOOD: CPT

## 2023-09-30 PROCEDURE — 70450 CT HEAD/BRAIN W/O DYE: CPT | Mod: MA

## 2023-09-30 PROCEDURE — 83690 ASSAY OF LIPASE: CPT

## 2023-09-30 PROCEDURE — 80053 COMPREHEN METABOLIC PANEL: CPT

## 2023-09-30 PROCEDURE — 99232 SBSQ HOSP IP/OBS MODERATE 35: CPT

## 2023-09-30 PROCEDURE — 85025 COMPLETE CBC W/AUTO DIFF WBC: CPT

## 2023-09-30 PROCEDURE — 85610 PROTHROMBIN TIME: CPT

## 2023-09-30 PROCEDURE — 99239 HOSP IP/OBS DSCHRG MGMT >30: CPT | Mod: GC

## 2023-09-30 PROCEDURE — 83521 IG LIGHT CHAINS FREE EACH: CPT

## 2023-09-30 PROCEDURE — 87507 IADNA-DNA/RNA PROBE TQ 12-25: CPT

## 2023-09-30 PROCEDURE — 71045 X-RAY EXAM CHEST 1 VIEW: CPT

## 2023-09-30 PROCEDURE — 83540 ASSAY OF IRON: CPT

## 2023-09-30 PROCEDURE — 81001 URINALYSIS AUTO W/SCOPE: CPT

## 2023-09-30 PROCEDURE — 74177 CT ABD & PELVIS W/CONTRAST: CPT | Mod: MA

## 2023-09-30 PROCEDURE — 85027 COMPLETE CBC AUTOMATED: CPT

## 2023-09-30 PROCEDURE — 99291 CRITICAL CARE FIRST HOUR: CPT

## 2023-09-30 PROCEDURE — 87637 SARSCOV2&INF A&B&RSV AMP PRB: CPT

## 2023-09-30 PROCEDURE — 83735 ASSAY OF MAGNESIUM: CPT

## 2023-09-30 PROCEDURE — 96376 TX/PRO/DX INJ SAME DRUG ADON: CPT

## 2023-09-30 PROCEDURE — 87640 STAPH A DNA AMP PROBE: CPT

## 2023-09-30 PROCEDURE — 85730 THROMBOPLASTIN TIME PARTIAL: CPT

## 2023-09-30 PROCEDURE — 82728 ASSAY OF FERRITIN: CPT

## 2023-09-30 PROCEDURE — 84100 ASSAY OF PHOSPHORUS: CPT

## 2023-09-30 PROCEDURE — 87040 BLOOD CULTURE FOR BACTERIA: CPT

## 2023-09-30 PROCEDURE — 87641 MR-STAPH DNA AMP PROBE: CPT

## 2023-09-30 PROCEDURE — 87086 URINE CULTURE/COLONY COUNT: CPT

## 2023-09-30 PROCEDURE — 83605 ASSAY OF LACTIC ACID: CPT

## 2023-09-30 PROCEDURE — 93005 ELECTROCARDIOGRAM TRACING: CPT

## 2023-09-30 PROCEDURE — 92610 EVALUATE SWALLOWING FUNCTION: CPT

## 2023-09-30 PROCEDURE — 84145 PROCALCITONIN (PCT): CPT

## 2023-09-30 PROCEDURE — 96374 THER/PROPH/DIAG INJ IV PUSH: CPT

## 2023-09-30 PROCEDURE — 71260 CT THORAX DX C+: CPT | Mod: MA

## 2023-09-30 PROCEDURE — 80048 BASIC METABOLIC PNL TOTAL CA: CPT

## 2023-09-30 PROCEDURE — 82962 GLUCOSE BLOOD TEST: CPT

## 2023-09-30 PROCEDURE — 83550 IRON BINDING TEST: CPT

## 2023-09-30 PROCEDURE — 86334 IMMUNOFIX E-PHORESIS SERUM: CPT

## 2023-09-30 PROCEDURE — 96375 TX/PRO/DX INJ NEW DRUG ADDON: CPT

## 2023-09-30 PROCEDURE — 73060 X-RAY EXAM OF HUMERUS: CPT

## 2023-09-30 PROCEDURE — 82607 VITAMIN B-12: CPT

## 2023-09-30 PROCEDURE — 84165 PROTEIN E-PHORESIS SERUM: CPT

## 2023-09-30 PROCEDURE — 82746 ASSAY OF FOLIC ACID SERUM: CPT

## 2023-09-30 PROCEDURE — 84166 PROTEIN E-PHORESIS/URINE/CSF: CPT

## 2023-09-30 PROCEDURE — 82803 BLOOD GASES ANY COMBINATION: CPT

## 2023-09-30 PROCEDURE — 36415 COLL VENOUS BLD VENIPUNCTURE: CPT

## 2023-09-30 PROCEDURE — 84155 ASSAY OF PROTEIN SERUM: CPT

## 2023-09-30 RX ORDER — LATANOPROST 0.05 MG/ML
1 SOLUTION/ DROPS OPHTHALMIC; TOPICAL
Refills: 0 | DISCHARGE

## 2023-09-30 RX ORDER — MODAFINIL 200 MG/1
1 TABLET ORAL
Refills: 0 | DISCHARGE

## 2023-09-30 RX ORDER — MUPIROCIN 20 MG/G
1 OINTMENT TOPICAL
Refills: 0 | DISCHARGE

## 2023-09-30 RX ORDER — HYDRALAZINE HCL 50 MG
1 TABLET ORAL
Refills: 0 | DISCHARGE
Start: 2023-09-30

## 2023-09-30 RX ORDER — HYDRALAZINE HCL 50 MG
1 TABLET ORAL
Refills: 0 | DISCHARGE

## 2023-09-30 RX ORDER — POTASSIUM CHLORIDE 20 MEQ
40 PACKET (EA) ORAL ONCE
Refills: 0 | Status: COMPLETED | OUTPATIENT
Start: 2023-09-30 | End: 2023-09-30

## 2023-09-30 RX ORDER — HYDRALAZINE HCL 50 MG
25 TABLET ORAL EVERY 12 HOURS
Refills: 0 | Status: DISCONTINUED | OUTPATIENT
Start: 2023-09-30 | End: 2023-09-30

## 2023-09-30 RX ORDER — PANTOPRAZOLE SODIUM 20 MG/1
1 TABLET, DELAYED RELEASE ORAL
Qty: 0 | Refills: 0 | DISCHARGE
Start: 2023-09-30

## 2023-09-30 RX ORDER — LATANOPROST 0.05 MG/ML
1 SOLUTION/ DROPS OPHTHALMIC; TOPICAL
Qty: 0 | Refills: 0 | DISCHARGE
Start: 2023-09-30

## 2023-09-30 RX ORDER — MODAFINIL 200 MG/1
1 TABLET ORAL
Qty: 0 | Refills: 0 | DISCHARGE
Start: 2023-09-30

## 2023-09-30 RX ADMIN — Medication 60 MILLIGRAM(S): at 13:21

## 2023-09-30 RX ADMIN — Medication 25 MILLIGRAM(S): at 06:43

## 2023-09-30 RX ADMIN — Medication 60 MILLIGRAM(S): at 00:35

## 2023-09-30 RX ADMIN — CEFTRIAXONE 100 MILLIGRAM(S): 500 INJECTION, POWDER, FOR SOLUTION INTRAMUSCULAR; INTRAVENOUS at 09:30

## 2023-09-30 RX ADMIN — Medication 40 MILLIEQUIVALENT(S): at 13:20

## 2023-09-30 RX ADMIN — Medication 1 APPLICATION(S): at 05:46

## 2023-09-30 RX ADMIN — PANTOPRAZOLE SODIUM 40 MILLIGRAM(S): 20 TABLET, DELAYED RELEASE ORAL at 05:47

## 2023-09-30 RX ADMIN — APIXABAN 2.5 MILLIGRAM(S): 2.5 TABLET, FILM COATED ORAL at 05:47

## 2023-09-30 RX ADMIN — MUPIROCIN 1 APPLICATION(S): 20 OINTMENT TOPICAL at 05:47

## 2023-09-30 RX ADMIN — MODAFINIL 100 MILLIGRAM(S): 200 TABLET ORAL at 09:29

## 2023-09-30 RX ADMIN — Medication 100 MILLIGRAM(S): at 05:46

## 2023-09-30 RX ADMIN — Medication 60 MILLIGRAM(S): at 05:47

## 2023-09-30 NOTE — CASE MANAGEMENT PROGRESS NOTE - NSCMPROGRESSNOTE_GEN_ALL_CORE
Patient is anticipated for transition home today.  Spoke with wife Love on phone to inform and she is agreeable.  Patient is anticipated resumption of HHA for 4pm at residence and has been accepted by [facility] for resumption of visiting nurse.  [facility]  Will remain available from a case management perspective.

## 2023-09-30 NOTE — PROGRESS NOTE ADULT - SUBJECTIVE AND OBJECTIVE BOX
[facility] DIV[facility] of INFECTIOUS DISEASE  Ovidio Manning MD PhD, Jojo Boggs MD, Hafsa Bhardwaj MD, Danica Goldsmith MD, Samy Terry MD  and providing coverage with Meet Perea MD  Providing Infectious Disease Consultations at [facility], [facility], [facility]'s    Office# [phone] to schedule follow up appointments  Answering Service for urgent calls or New Consults [phone]  Cell# to text for urgent issues Ovidio Manning [phone]     infectious diseases progress note:    MELBA PARMAR is a 73y y. o. Male patient    Overnight and events of the last 24hrs reviewed    Allergies    No Known Allergies    Intolerances        ANTIBIOTICS/RELEVANT:  antimicrobials    immunologic:  influenza  Vaccine (HIGH DOSE) 0.7 milliLiter(s) IntraMuscular once    OTHER:  ammonium lactate 12% Lotion 1 Application(s) Topical two times a day  apixaban 2.5 milliGRAM(s) Oral two times a day  diltiazem    Tablet 60 milliGRAM(s) Oral every 6 hours  hydrALAZINE 25 milliGRAM(s) Oral every 12 hours  latanoprost 0.005% Ophthalmic Solution 1 Drop(s) Both EYES at bedtime  modafinil 100 milliGRAM(s) Oral daily  mupirocin 2% Nasal 1 Application(s) Both Nostrils two times a day  pantoprazole    Tablet 40 milliGRAM(s) Oral before breakfast  potassium chloride   Powder 40 milliEquivalent(s) Oral once      Objective:  Vital Signs Last 24 Hrs  T(C): 36.7 (30 Sep 2023 11:51), Max: 36.8 (30 Sep 2023 04:47)  T(F): 98 (30 Sep 2023 11:51), Max: 98.2 (30 Sep 2023 04:47)  HR: 100 (30 Sep 2023 11:51) (79 - 100)  BP: 163/95 (30 Sep 2023 11:51) (111/84 - 168/99)  BP(mean): --  RR: 18 (30 Sep 2023 11:51) (18 - 18)  SpO2: 98% (30 Sep 2023 11:51) (94% - 98%)    Parameters below as of 30 Sep 2023 11:51  Patient On (Oxygen Delivery Method): room air        T(C): 36.7 (09-30-23 @ 11:51), Max: 37.2 (09-29-23 @ 12:12)  T(C): 36.7 (09-30-23 @ 11:51), Max: 37.5 (09-28-23 @ 08:05)  T(C): 36.7 (09-30-23 @ 11:51), Max: 37.5 (09-28-23 @ 08:05)    PHYSICAL EXAM:  HEENT: NC atraumatic  Neck: supple  Respiratory: no accessory muscle use, breathing comfortably  Cardiovascular: distant  Gastrointestinal: normal appearing, nondistended  Extremities: no clubbing, no cyanosis,        LABS:                          10.3   7.34  )-----------( 214      ( 30 Sep 2023 06:55 )             31.9       WBC  7.34 09-30 @ 06:55  5.47 09-29 @ 07:08  7.11 09-28 @ 05:35  6.17 09-27 @ 20:28  6.34 09-27 @ 05:25  23.19 09-26 @ 04:37  21.65 09-26 @ 04:00  29.61 09-25 @ 14:14      09-30    142  |  107  |  11  ----------------------------<  123<H>  3.4<L>   |  27  |  0.88    Ca    8.9      30 Sep 2023 06:55  Phos  2.6     09-30  Mg     1.8     09-30    TPro  6.3  /  Alb  2.9<L>  /  TBili  0.4  /  DBili  x   /  AST  10<L>  /  ALT  15  /  AlkPhos  79  09-30      Creatinine: 0.88 mg/dL (09-30-23 @ 06:55)  Creatinine: 0.98 mg/dL (09-29-23 @ 07:08)  Creatinine: 1.20 mg/dL (09-28-23 @ 05:35)  Creatinine: 1.30 mg/dL (09-27-23 @ 05:25)  Creatinine: 1.40 mg/dL (09-26-23 @ 04:37)  Creatinine: 1.20 mg/dL (09-25-23 @ 22:20)  Creatinine: 1.40 mg/dL (09-25-23 @ 14:14)        Urinalysis Basic - ( 30 Sep 2023 06:55 )    Color: x / Appearance: x / SG: x / pH: x  Gluc: 123 mg/dL / Ketone: x  / Bili: x / Urobili: x   Blood: x / Protein: x / Nitrite: x   Leuk Esterase: x / RBC: x / WBC x   Sq Epi: x / Non Sq Epi: x / Bacteria: x            INFLAMMATORY MARKERS      MICROBIOLOGY:              RADIOLOGY & ADDITIONAL STUDIES:

## 2023-09-30 NOTE — PROGRESS NOTE ADULT - PROVIDER SPECIALTY LIST ADULT
Cardiology
Critical Care
Neurology
Infectious Disease
Surgery
Cardiology
Cardiology
Critical Care
Hospitalist
Surgery
Cardiology
Cardiology
Critical Care
Critical Care
Infectious Disease
Hospitalist

## 2023-09-30 NOTE — PROGRESS NOTE ADULT - NS ATTEND AMEND GEN_ALL_CORE FT
af, now with better rate control  cont ccb  ct head with stable subdural  cont ac with eliquis  no sign of volume overload.

## 2023-09-30 NOTE — PROGRESS NOTE ADULT - ASSESSMENT
74 y/o M with CAD, Afib, HTN, HLD, BPH, peripheral neuropathy, SDH s/p craniotomy, Covid Pna, and major depression presented to the ED c/o vomiting and diarrhea, found to be in sepsis and rapid Afib.    Rapid Afib/Sepsis  - With known Hx of Afib and on Eliquis half a dose in the setting of SDH  - s/p Cardizem gtt.  Continue Cardizem 60 mg q12H.  Rates have been controlled per tele.  Can D/C monitor  - Continue FD Lovenox, per Neuro.  Repeat CT head showed stable SDH    - No clear volume overload.  Now on RA and non-orthopneic  - Had TTE which was tls but overall preserved EF in 8/15/23.  No need to repeat    - No acute ischemia  - Presumed CAD on the basis of inferior q waves, but no definite CAD  - Would resume statin when able    - BP stable and controlled but would hold home Hydralazine if needed to allow room for AVN uptitration  - Monitor electrolytes, replete to keep K>4 and Mag>2   - Will continue to follow.      Allison Perez DNP, NP-C, AGACNP-C  Cardiology   Call TEAMS

## 2023-09-30 NOTE — PROGRESS NOTE ADULT - SUBJECTIVE AND OBJECTIVE BOX
[facility] -- Alexey Briceno, Agus Oglesby Savella, , Con Lombardi  Office # [phone]    Follow Up:      Subjective/Observations:     REVIEW OF SYSTEMS: All other review of systems is negative unless indicated above  PAST MEDICAL & SURGICAL HISTORY:  TBI (traumatic brain injury)      HTN (hypertension)      Atrial fibrillation      Peripheral neuropathy      Major depression      HLD (hyperlipidemia)      CAD (coronary artery disease)      BPH (benign prostatic hyperplasia)      Pneumonia due to COVID-19 virus  June 2022      Hemorrhage in the brain      H/O craniotomy        MEDICATIONS  (STANDING):  ammonium lactate 12% Lotion 1 Application(s) Topical two times a day  apixaban 2.5 milliGRAM(s) Oral two times a day  cefTRIAXone   IVPB 1000 milliGRAM(s) IV Intermittent once  diltiazem    Tablet 60 milliGRAM(s) Oral every 6 hours  hydrALAZINE 25 milliGRAM(s) Oral every 12 hours  influenza  Vaccine (HIGH DOSE) 0.7 milliLiter(s) IntraMuscular once  latanoprost 0.005% Ophthalmic Solution 1 Drop(s) Both EYES at bedtime  modafinil 100 milliGRAM(s) Oral daily  mupirocin 2% Nasal 1 Application(s) Both Nostrils two times a day  pantoprazole    Tablet 40 milliGRAM(s) Oral before breakfast    MEDICATIONS  (PRN):    Allergies    No Known Allergies    Intolerances      Vital Signs Last 24 Hrs  T(C): 36.8 (30 Sep 2023 04:47), Max: 37.2 (29 Sep 2023 12:12)  T(F): 98.2 (30 Sep 2023 04:47), Max: 99 (29 Sep 2023 12:12)  HR: 79 (30 Sep 2023 04:47) (79 - 99)  BP: 165/96 (30 Sep 2023 04:47) (111/84 - 168/99)  BP(mean): --  RR: 18 (30 Sep 2023 04:47) (18 - 18)  SpO2: 94% (30 Sep 2023 04:47) (94% - 99%)    Parameters below as of 29 Sep 2023 20:08  Patient On (Oxygen Delivery Method): nasal cannula      I&O's Summary    29 Sep 2023 07:01  -  30 Sep 2023 07:00  --------------------------------------------------------  IN: 0 mL / OUT: 1000 mL / NET: -1000 mL        PHYSICAL EXAM:  TELE:   Constitutional: NAD, awake and alert, well-developed  HEENT: Moist Mucous Membranes, Anicteric  Pulmonary: Non-labored, breath sounds are clear bilaterally, No wheezing, rales or rhonchi  Cardiovascular: Regular, S1 and S2, No murmurs, rubs, gallops or clicks  Gastrointestinal: Bowel Sounds present, soft, nontender.   Lymph: No peripheral edema. No lymphadenopathy.  Skin: No visible rashes or ulcers.  Psych:  Mood & affect appropriate  LABS: All Labs Reviewed:                        8.2    5.47  )-----------( 165      ( 29 Sep 2023 07:08 )             24.8                         8.4    7.11  )-----------( 184      ( 28 Sep 2023 05:35 )             25.2                         8.9    6.17  )-----------( 212      ( 27 Sep 2023 20:28 )             26.8     29 Sep 2023 07:08    143    |  106    |  12     ----------------------------<  115    3.4     |  30     |  0.98   28 Sep 2023 05:35    143    |  104    |  16     ----------------------------<  128    3.5     |  33     |  1.20     Ca    8.4        29 Sep 2023 07:08  Ca    8.3        28 Sep 2023 05:35  Phos  2.8       29 Sep 2023 07:08  Phos  2.8       28 Sep 2023 05:35  Mg     1.9       29 Sep 2023 07:08  Mg     2.1       28 Sep 2023 05:35    TPro  4.9    /  Alb  x      /  TBili  x      /  DBili  x      /  AST  x      /  ALT  x      /  AlkPhos  x      28 Sep 2023 05:35          12 Lead ECG:   Ventricular Rate 128 BPM    Atrial Rate 117 BPM    QRS Duration 56 ms    Q-T Interval 314 ms    QTC Calculation(Bazett) 458 ms    R Axis 246 degrees    T Axis 102 degrees    Diagnosis Line Atrial fibrillation with rapid ventricular response  Low voltage QRS  Inferior infarct (cited on or before 22-MAY-2022)  Anterolateral infarct (cited on or before 22-MAY-2022)    Confirmed by TOMMY OGLESBY ([id number]) on 9/25/2023 3:52:55 PM (09-25-23 @ 14:43)      TRANSTHORACIC ECHOCARDIOGRAM REPORT  ________________________________________________________________________________                                      _______       Pt. Name:       MELBA PARMAR Study Date:    8/14/2023  MRN:            [id number]       YOB: 1950  Accession #:    [id number]      Age:           73 years  Account#:       [id number]     Gender:        M  Heart Rate:                    Height:        69.00 in (175.26 cm)  Rhythm:                        Weight:  134.00 lb (60.78 kg)  Blood Pressure: 120/74 mmHg    BSA/BMI:       1.74 m² / 19.79 kg/m²  ________________________________________________________________________________________  Referring Physician:    [id number] Ranulfo Boggs  Interpreting Physician: Lexi Seals  Primary Sonographer:    Celia Whitmore KARLYE    CPT:               ECHO TTE WO CON COMP W DOPP - 14654.m  Indication(s):     Shock, unspecified - R57.9  Procedure:         Transthoracic echocardiogram with 2-D, M-mode and complete                     spectral and color flow Doppler.  Ordering Location: [facility]  Study Information: Image quality for this study is technically difficult.    _______________________________________________________________________________________     CONCLUSIONS:      1. Technically difficult image quality.   2. Left ventricular systolic function is normal with an ejection fraction visually estimated at 60 to 65 %.   3. The right ventricle is not well visualized.   4. The left atrium is mildly dilated in size.   5. Aortic valve was not well visualized.   6. Trace mitral regurgitation.   7. Trace tricuspid regurgitation.    ________________________________________________________________________________________  FINDINGS:     Left Ventricle:  Left ventricular systolic function is normal with an ejection fraction visually estimated at 60 to 65%.     Right Ventricle:  The right ventricle is not well visualized. Normal wall thickness.     Left Atrium:  The left atrium is mildly dilated in size.     Right Atrium:  The right atrium is normal in size.     Aortic Valve:  The aortic valve was not well visualized.     Mitral Valve:  There is trace mitral regurgitation.     Tricuspid Valve:  There is trace tricuspid regurgitation. Estimated pulmonary artery systolic pressure is 29 mmHg.     Pulmonic Valve:  The pulmonic valve was not well visualized.     Pericardium:  No pericardial effusion seen.     Systemic Veins:  The inferior vena cava is normal in size (normal <2.1cm) with normal inspiratory collapse (normal >50%) consistent with normal right atrial pressure (~3, range 0-5mmHg).  ____________________________________________________________________  Quantitative Data:  Left Ventricle Measurements: (Indexed to BSA)     IVSd (2D):   1.2 cm  LVPWd (2D):  1.2 cm  LVIDd (2D):  4.1 cm  LVIDs (2D):  2.6 cm  LV Mass:     167 g  96.0 g/m²  Visualized LV EF%: 60 to 65%     MV E Vmax:    0.73 m/s  e' lateral:   12.00 cm/s  e' medial:    8.49 cm/s  E/e' lateral: 6.12  E/e' medial:  8.65  E/e' Average: 7.16  MV DT:        120 msec       Left Atrium Measurements: (Indexed to BSA)  LA Diam 2D: 4.10 cm       LVOT / RVOT/ Qp/Qs Data: (Indexed to BSA)  LVOT Diameter: 2.00 cm    Mitral Valve Measurements:     MV E Vmax: 0.7 m/s       Tricuspid Valve Measurements:     TR Vmax:          2.5 m/s  TR Peak Gradient: 25.6 mmHg  RA Pressure:      3 mmHg  PASP:             29 mmHg    ________________________________________________________________________________________  Electronically signed on8/15/2023 at 12:43:28 PM by Lexi Seals         *** Final ***      [facility] -- Alexey Briceno, Coco, Mick Goldsmith, , Con Lombardi  Office # [phone]    Follow Up:  Afib with RVR    Subjective/Observations: Awake and alert but non-verbal.  Tolerating RA with no SOB or orthopnea noted.  No complaints.  No overnight tele events.  Rates remained controlled    REVIEW OF SYSTEMS: All other review of systems is negative unless indicated above  PAST MEDICAL & SURGICAL HISTORY:  TBI (traumatic brain injury)  HTN (hypertension)  Atrial fibrillation  Peripheral neuropathy  Major depression  HLD (hyperlipidemia)  CAD (coronary artery disease)  BPH (benign prostatic hyperplasia)  Pneumonia due to COVID-19 virus  June 2022  Hemorrhage in the brain  H/O craniotomy    MEDICATIONS  (STANDING):  ammonium lactate 12% Lotion 1 Application(s) Topical two times a day  apixaban 2.5 milliGRAM(s) Oral two times a day  cefTRIAXone   IVPB 1000 milliGRAM(s) IV Intermittent once  diltiazem    Tablet 60 milliGRAM(s) Oral every 6 hours  hydrALAZINE 25 milliGRAM(s) Oral every 12 hours  influenza  Vaccine (HIGH DOSE) 0.7 milliLiter(s) IntraMuscular once  latanoprost 0.005% Ophthalmic Solution 1 Drop(s) Both EYES at bedtime  modafinil 100 milliGRAM(s) Oral daily  mupirocin 2% Nasal 1 Application(s) Both Nostrils two times a day  pantoprazole    Tablet 40 milliGRAM(s) Oral before breakfast    MEDICATIONS  (PRN):    Allergies    No Known Allergies    Intolerances    Vital Signs Last 24 Hrs  T(C): 36.8 (30 Sep 2023 04:47), Max: 37.2 (29 Sep 2023 12:12)  T(F): 98.2 (30 Sep 2023 04:47), Max: 99 (29 Sep 2023 12:12)  HR: 79 (30 Sep 2023 04:47) (79 - 99)  BP: 165/96 (30 Sep 2023 04:47) (111/84 - 168/99)  BP(mean): --  RR: 18 (30 Sep 2023 04:47) (18 - 18)  SpO2: 94% (30 Sep 2023 04:47) (94% - 99%)    Parameters below as of 29 Sep 2023 20:08  Patient On (Oxygen Delivery Method): nasal cannula    I&O's Summary    29 Sep 2023 07:01  -  30 Sep 2023 07:00  --------------------------------------------------------  IN: 0 mL / OUT: 1000 mL / NET: -1000 mL    PHYSICAL EXAM:  TELE: Afib  Constitutional: NAD, awake and alert, non-verbal, well-developed  HEENT: Moist Mucous Membranes, Anicteric  Pulmonary: Non-labored, breath sounds are clear bilaterally, No wheezing, rales or rhonchi  Cardiovascular: IRRR, S1 and S2, No murmurs, rubs, gallops or clicks  Gastrointestinal: Bowel Sounds present, soft, nontender.  MSK: RUE contracted  Lymph: No peripheral edema. No lymphadenopathy.  Skin: No visible rashes or ulcers.  Psych:  Mood & affect: Flat    LABS: All Labs Reviewed:                        8.2    5.47  )-----------( 165      ( 29 Sep 2023 07:08 )             24.8                         8.4    7.11  )-----------( 184      ( 28 Sep 2023 05:35 )             25.2                         8.9    6.17  )-----------( 212      ( 27 Sep 2023 20:28 )             26.8     29 Sep 2023 07:08    143    |  106    |  12     ----------------------------<  115    3.4     |  30     |  0.98   28 Sep 2023 05:35    143    |  104    |  16     ----------------------------<  128    3.5     |  33     |  1.20     Ca    8.4        29 Sep 2023 07:08  Ca    8.3        28 Sep 2023 05:35  Phos  2.8       29 Sep 2023 07:08  Phos  2.8       28 Sep 2023 05:35  Mg     1.9       29 Sep 2023 07:08  Mg     2.1       28 Sep 2023 05:35    TPro  4.9    /  Alb  x      /  TBili  x      /  DBili  x      /  AST  x      /  ALT  x      /  AlkPhos  x      28 Sep 2023 05:35    12 Lead ECG:   Ventricular Rate 128 BPM    Atrial Rate 117 BPM    QRS Duration 56 ms    Q-T Interval 314 ms    QTC Calculation(Bazett) 458 ms    R Axis 246 degrees    T Axis 102 degrees    Diagnosis Line Atrial fibrillation with rapid ventricular response  Low voltage QRS  Inferior infarct (cited on or before 22-MAY-2022)  Anterolateral infarct (cited on or before 22-MAY-2022)    Confirmed by TOMMY OGLESBY (92) on 9/25/2023 3:52:55 PM (09-25-23 @ 14:43)      TRANSTHORACIC ECHOCARDIOGRAM REPORT  ________________________________________________________________________________                                      _______       Pt. Name:       MELBA PARMAR Study Date:    8/14/2023  MRN:            [id number]       YOB: 1950  Accession #:    [id number]      Age:           73 years  Account#:       [id number]     Gender:        M  Heart Rate:                    Height:        69.00 in (175.26 cm)  Rhythm:                        Weight:  134.00 lb (60.78 kg)  Blood Pressure: 120/74 mmHg    BSA/BMI:       1.74 m² / 19.79 kg/m²  ________________________________________________________________________________________  Referring Physician:    [id number] Ranulfo Boggs  Interpreting Physician: Lexi Seals  Primary Sonographer:    Celia Whitmore RDCS    CPT:               ECHO TTE WO CON COMP W DOPP - 56305.m  Indication(s):     Shock, unspecified - R57.9  Procedure:         Transthoracic echocardiogram with 2-D, M-mode and complete                     spectral and color flow Doppler.  Ordering Location: ICU1  Study Information: Image quality for this study is technically difficult.    _______________________________________________________________________________________     CONCLUSIONS:      1. Technically difficult image quality.   2. Left ventricular systolic function is normal with an ejection fraction visually estimated at 60 to 65 %.   3. The right ventricle is not well visualized.   4. The left atrium is mildly dilated in size.   5. Aortic valve was not well visualized.   6. Trace mitral regurgitation.   7. Trace tricuspid regurgitation.    ________________________________________________________________________________________  FINDINGS:     Left Ventricle:  Left ventricular systolic function is normal with an ejection fraction visually estimated at 60 to 65%.     Right Ventricle:  The right ventricle is not well visualized. Normal wall thickness.     Left Atrium:  The left atrium is mildly dilated in size.     Right Atrium:  The right atrium is normal in size.     Aortic Valve:  The aortic valve was not well visualized.     Mitral Valve:  There is trace mitral regurgitation.     Tricuspid Valve:  There is trace tricuspid regurgitation. Estimated pulmonary artery systolic pressure is 29 mmHg.     Pulmonic Valve:  The pulmonic valve was not well visualized.     Pericardium:  No pericardial effusion seen.     Systemic Veins:  The inferior vena cava is normal in size (normal <2.1cm) with normal inspiratory collapse (normal >50%) consistent with normal right atrial pressure (~3, range 0-5mmHg).  ____________________________________________________________________  Quantitative Data:  Left Ventricle Measurements: (Indexed to BSA)     IVSd (2D):   1.2 cm  LVPWd (2D):  1.2 cm  LVIDd (2D):  4.1 cm  LVIDs (2D):  2.6 cm  LV Mass:     167 g  96.0 g/m²  Visualized LV EF%: 60 to 65%     MV E Vmax:    0.73 m/s  e' lateral:   12.00 cm/s  e' medial:    8.49 cm/s  E/e' lateral: 6.12  E/e' medial:  8.65  E/e' Average: 7.16  MV DT:        120 msec       Left Atrium Measurements: (Indexed to BSA)  LA Diam 2D: 4.10 cm     LVOT / RVOT/ Qp/Qs Data: (Indexed to BSA)  LVOT Diameter: 2.00 cm    Mitral Valve Measurements:     MV E Vmax: 0.7 m/s    Tricuspid Valve Measurements:     TR Vmax:          2.5 m/s  TR Peak Gradient: 25.6 mmHg  RA Pressure:      3 mmHg  PASP:             29 mmHg  _______________________________________________________________________________________  Electronically signed on8/15/2023 at 12:43:28 PM by Lexi Seals    *** Final ***

## 2023-09-30 NOTE — CHART NOTE - NSCHARTNOTEFT_GEN_A_CORE
Assessment: patient seen for malnutrition follow up   73y old  Male who presents with a chief complaint of A- fib with RVR and lactic acidosis   patient on soft and bite sized diet per speech evaluation  9/29 fecal incontinence   K 3.4 KCl supplemented   patient with history TBI unable to interview per RN possible discharge today  MOLST in chart      Factors impacting intake: [ ] none [ ] nausea  [ ] vomiting [ ] diarrhea [ ] constipation  [ ]chewing problems [x] swallowing issues  [ ] other: diet per speech     Diet Prescription: soft and bite sized   Intake: will follow PO this admit    Current Weight: Weight 9/28 119# + 1 generalized edema     Pertinent Medications: MEDICATIONS  (STANDING):  ammonium lactate 12% Lotion 1 Application(s) Topical two times a day  apixaban 2.5 milliGRAM(s) Oral two times a day  diltiazem    Tablet 60 milliGRAM(s) Oral every 6 hours  hydrALAZINE 25 milliGRAM(s) Oral every 12 hours  influenza  Vaccine (HIGH DOSE) 0.7 milliLiter(s) IntraMuscular once  latanoprost 0.005% Ophthalmic Solution 1 Drop(s) Both EYES at bedtime  modafinil 100 milliGRAM(s) Oral daily  mupirocin 2% Nasal 1 Application(s) Both Nostrils two times a day  pantoprazole    Tablet 40 milliGRAM(s) Oral before breakfast        Pertinent Labs: K 3.4 Iron 30 B12 and folate WNL  Skin: sacrum stage 1 right heel stage 1    Estimated Needs:   [ x] no change since previous assessment based on wt at admit 121# 30-35kcals/kg 1644-1918kcals and 1.2-1.4gms protein/kg 65-76gms protein  [ ] recalculated:     Previous Nutrition Diagnosis:   [ ] Inadequate Energy Intake [ ]Inadequate Oral Intake [ ] Excessive Energy Intake   [ ] Underweight [ ] Increased Nutrient Needs [ ] Overweight/Obesity   [ ] Altered GI Function [ ] Unintended Weight Loss [ ] Food & Nutrition Related Knowledge Deficit [x ] Malnutrition acute moderate malnutrition     Nutrition Diagnosis is [x ] ongoing  [ ] resolved [ ] not applicable     New Nutrition Diagnosis: [x ] not applicable       Interventions:   Recommend  [ ] Change Diet To:  [ ] Nutrition Supplement  [ ] Nutrition Support  x[ ] Other: recommend soft and bite sized low Na consistent cho diet with glucerna BID, recommend MVI     Monitoring and Evaluation:   x[ ] PO intake [ x ] Tolerance to diet prescription [ x ] weights [ x ] labs[ x ] follow up per protocol  [ ] other:

## 2023-09-30 NOTE — PROGRESS NOTE ADULT - ASSESSMENT
74 y/o M with PMHx T2DM on metformin with peripheral neuropathy, HTN, HLD A-fib (on Eliquis), TBI (s/p SDH with EVAC on 9/2021), and traumatic subdural hemorrhage in 10/2022 Covid Pna, and major depression presented to the ED with c/o vomiting and diarrhea. Pt started vomiting non stop with liquid BM for at least 1 1/2 hrs. Pt is at home, he has home aide 10/hr per day, bedbound at baseline. In ER, pt found to be in sepsis with lactate of 14 and in rapid Afib. Initially admitted to ICU with concern for sepsis with asp PNA vs colitis in differential.    RECOMMENDATIONS  1-SEPSIS pt started on Zosyn 9/25 afternoon, micro all NGTD and pt is MRSA PCR+ but improved without MRSA coverage. Currently with nl wbc, normothermic,   completed abx 9/30 as last day so changed to ceftriaxone with last dose 9/30 am and metronidazole 500mg PO TID with last dose 9/30 am    From an ID standpoint no further requirement for inpatient status for the management of ID issues. Fine with discharge from ID standpoint when other medical issues no longer require inpatient care and social issues allow for a safe discharge plan. To schedule an outpatient ID follow up appointment please call our office at [phone]      Thank you for consulting us and involving us in the management of this most interesting and challenging case.  We will follow along in the care of this patient. Please call us at [phone] or text me directly on my cell# at [phone] with any concerns.

## 2023-10-01 LAB
CREATININE, URINE RESULT: 25 MG/DL — SIGNIFICANT CHANGE UP
PROT ?TM UR-MCNC: 28 MG/DL — HIGH (ref 0–12)

## 2023-10-03 ENCOUNTER — NON-APPOINTMENT (OUTPATIENT)
Age: 73
End: 2023-10-03

## 2023-10-03 LAB
% GAMMA, URINE: 5.8 % — SIGNIFICANT CHANGE UP
ALBUMIN 24H MFR UR ELPH: 31.7 % — SIGNIFICANT CHANGE UP
ALPHA1 GLOB 24H MFR UR ELPH: 32.9 % — SIGNIFICANT CHANGE UP
ALPHA2 GLOB 24H MFR UR ELPH: 19.1 % — SIGNIFICANT CHANGE UP
B-GLOBULIN 24H MFR UR ELPH: 10.5 % — SIGNIFICANT CHANGE UP
INTERPRETATION 24H UR IFE-IMP: SIGNIFICANT CHANGE UP
M PROTEIN 24H UR ELPH-MRATE: SIGNIFICANT CHANGE UP
PROT ?TM UR-MCNC: 28 MG/DL — HIGH (ref 0–12)
PROT PATTERN 24H UR ELPH-IMP: SIGNIFICANT CHANGE UP
TOTAL VOLUME - 24 HOUR: SIGNIFICANT CHANGE UP ML
URINE CREATININE CALCULATION: SIGNIFICANT CHANGE UP G/24 H (ref 1–2)

## 2023-10-04 LAB
% ALBUMIN: 61.2 % — SIGNIFICANT CHANGE UP
% ALPHA 1: 6.8 % — SIGNIFICANT CHANGE UP
% ALPHA 2: 14 % — SIGNIFICANT CHANGE UP
% BETA: 9.7 % — SIGNIFICANT CHANGE UP
% GAMMA: 8.3 % — SIGNIFICANT CHANGE UP
ALBUMIN SERPL ELPH-MCNC: 3 G/DL — LOW (ref 3.6–5.5)
ALBUMIN/GLOB SERPL ELPH: 1.6 RATIO — SIGNIFICANT CHANGE UP
ALPHA1 GLOB SERPL ELPH-MCNC: 0.3 G/DL — SIGNIFICANT CHANGE UP (ref 0.1–0.4)
ALPHA2 GLOB SERPL ELPH-MCNC: 0.7 G/DL — SIGNIFICANT CHANGE UP (ref 0.5–1)
B-GLOBULIN SERPL ELPH-MCNC: 0.5 G/DL — SIGNIFICANT CHANGE UP (ref 0.5–1)
GAMMA GLOBULIN: 0.4 G/DL — LOW (ref 0.6–1.6)
PROT PATTERN SERPL ELPH-IMP: SIGNIFICANT CHANGE UP

## 2023-10-12 ENCOUNTER — APPOINTMENT (OUTPATIENT)
Dept: CARDIOLOGY | Facility: CLINIC | Age: 73
End: 2023-10-12
Payer: COMMERCIAL

## 2023-10-12 PROCEDURE — 99214 OFFICE O/P EST MOD 30 MIN: CPT | Mod: 95

## 2023-10-12 RX ORDER — ROSUVASTATIN CALCIUM 10 MG/1
10 TABLET, FILM COATED ORAL
Qty: 90 | Refills: 3 | Status: DISCONTINUED | COMMUNITY
Start: 2020-02-21 | End: 2023-10-12

## 2023-10-16 ENCOUNTER — APPOINTMENT (OUTPATIENT)
Dept: WOUND CARE | Facility: HOSPITAL | Age: 73
End: 2023-10-16
Payer: COMMERCIAL

## 2023-10-16 ENCOUNTER — OUTPATIENT (OUTPATIENT)
Dept: OUTPATIENT SERVICES | Facility: HOSPITAL | Age: 73
LOS: 1 days | Discharge: HOME | End: 2023-10-16
Payer: COMMERCIAL

## 2023-10-16 VITALS
OXYGEN SATURATION: 97 % | BODY MASS INDEX: 20.09 KG/M2 | HEART RATE: 93 BPM | DIASTOLIC BLOOD PRESSURE: 94 MMHG | HEIGHT: 66 IN | SYSTOLIC BLOOD PRESSURE: 143 MMHG | WEIGHT: 125 LBS | RESPIRATION RATE: 18 BRPM | TEMPERATURE: 98.1 F

## 2023-10-16 DIAGNOSIS — L89.153 PRESSURE ULCER OF SACRAL REGION, STAGE 3: ICD-10-CM

## 2023-10-16 DIAGNOSIS — L97.812 NON-PRESSURE CHRONIC ULCER OF OTHER PART OF RIGHT LOWER LEG WITH FAT LAYER EXPOSED: ICD-10-CM

## 2023-10-16 DIAGNOSIS — Z98.890 OTHER SPECIFIED POSTPROCEDURAL STATES: Chronic | ICD-10-CM

## 2023-10-16 DIAGNOSIS — Z85.828 PERSONAL HISTORY OF OTHER MALIGNANT NEOPLASM OF SKIN: ICD-10-CM

## 2023-10-16 PROCEDURE — 99213 OFFICE O/P EST LOW 20 MIN: CPT

## 2023-10-16 PROCEDURE — G0463: CPT

## 2023-10-18 DIAGNOSIS — Z80.8 FAMILY HISTORY OF MALIGNANT NEOPLASM OF OTHER ORGANS OR SYSTEMS: ICD-10-CM

## 2023-10-18 DIAGNOSIS — E53.8 DEFICIENCY OF OTHER SPECIFIED B GROUP VITAMINS: ICD-10-CM

## 2023-10-18 DIAGNOSIS — E11.40 TYPE 2 DIABETES MELLITUS WITH DIABETIC NEUROPATHY, UNSPECIFIED: ICD-10-CM

## 2023-10-18 DIAGNOSIS — K21.9 GASTRO-ESOPHAGEAL REFLUX DISEASE WITHOUT ESOPHAGITIS: ICD-10-CM

## 2023-10-18 DIAGNOSIS — Z98.890 OTHER SPECIFIED POSTPROCEDURAL STATES: ICD-10-CM

## 2023-10-18 DIAGNOSIS — Z86.79 PERSONAL HISTORY OF OTHER DISEASES OF THE CIRCULATORY SYSTEM: ICD-10-CM

## 2023-10-18 DIAGNOSIS — Z79.01 LONG TERM (CURRENT) USE OF ANTICOAGULANTS: ICD-10-CM

## 2023-10-18 DIAGNOSIS — Z86.718 PERSONAL HISTORY OF OTHER VENOUS THROMBOSIS AND EMBOLISM: ICD-10-CM

## 2023-10-18 DIAGNOSIS — I10 ESSENTIAL (PRIMARY) HYPERTENSION: ICD-10-CM

## 2023-10-18 DIAGNOSIS — Z82.0 FAMILY HISTORY OF EPILEPSY AND OTHER DISEASES OF THE NERVOUS SYSTEM: ICD-10-CM

## 2023-10-18 DIAGNOSIS — Z79.84 LONG TERM (CURRENT) USE OF ORAL HYPOGLYCEMIC DRUGS: ICD-10-CM

## 2023-10-18 DIAGNOSIS — G91.9 HYDROCEPHALUS, UNSPECIFIED: ICD-10-CM

## 2023-10-18 DIAGNOSIS — D72.9 DISORDER OF WHITE BLOOD CELLS, UNSPECIFIED: ICD-10-CM

## 2023-10-18 DIAGNOSIS — Z83.438 FAMILY HISTORY OF OTHER DISORDER OF LIPOPROTEIN METABOLISM AND OTHER LIPIDEMIA: ICD-10-CM

## 2023-10-18 DIAGNOSIS — K44.9 DIAPHRAGMATIC HERNIA WITHOUT OBSTRUCTION OR GANGRENE: ICD-10-CM

## 2023-10-18 DIAGNOSIS — E55.9 VITAMIN D DEFICIENCY, UNSPECIFIED: ICD-10-CM

## 2023-10-18 DIAGNOSIS — Z83.3 FAMILY HISTORY OF DIABETES MELLITUS: ICD-10-CM

## 2023-10-18 DIAGNOSIS — Z85.820 PERSONAL HISTORY OF MALIGNANT MELANOMA OF SKIN: ICD-10-CM

## 2023-10-18 DIAGNOSIS — L89.153 PRESSURE ULCER OF SACRAL REGION, STAGE 3: ICD-10-CM

## 2023-10-18 DIAGNOSIS — Z82.49 FAMILY HISTORY OF ISCHEMIC HEART DISEASE AND OTHER DISEASES OF THE CIRCULATORY SYSTEM: ICD-10-CM

## 2023-10-18 DIAGNOSIS — N40.0 BENIGN PROSTATIC HYPERPLASIA WITHOUT LOWER URINARY TRACT SYMPTOMS: ICD-10-CM

## 2023-10-18 DIAGNOSIS — L97.812 NON-PRESSURE CHRONIC ULCER OF OTHER PART OF RIGHT LOWER LEG WITH FAT LAYER EXPOSED: ICD-10-CM

## 2023-10-18 DIAGNOSIS — Z86.010 PERSONAL HISTORY OF COLONIC POLYPS: ICD-10-CM

## 2023-10-18 DIAGNOSIS — Z79.899 OTHER LONG TERM (CURRENT) DRUG THERAPY: ICD-10-CM

## 2023-10-18 DIAGNOSIS — E78.00 PURE HYPERCHOLESTEROLEMIA, UNSPECIFIED: ICD-10-CM

## 2023-10-18 NOTE — SWALLOW BEDSIDE ASSESSMENT ADULT - SWALLOW EVAL: RECOMMENDED DIET
Soft/bite-sized and mildly thick liquids Where Do You Want The Question To Include Opioid Counseling Located?: Case Summary Tab

## 2023-11-08 ENCOUNTER — APPOINTMENT (OUTPATIENT)
Dept: INTERNAL MEDICINE | Facility: CLINIC | Age: 73
End: 2023-11-08

## 2023-11-08 ENCOUNTER — APPOINTMENT (OUTPATIENT)
Dept: INTERNAL MEDICINE | Facility: CLINIC | Age: 73
End: 2023-11-08
Payer: COMMERCIAL

## 2023-11-08 VITALS — HEIGHT: 66 IN | BODY MASS INDEX: 20.09 KG/M2 | WEIGHT: 125 LBS

## 2023-11-08 DIAGNOSIS — E11.9 TYPE 2 DIABETES MELLITUS W/OUT COMPLICATIONS: ICD-10-CM

## 2023-11-08 DIAGNOSIS — R73.09 OTHER ABNORMAL GLUCOSE: ICD-10-CM

## 2023-11-08 DIAGNOSIS — S09.90XS UNSPECIFIED INJURY OF HEAD, SEQUELA: ICD-10-CM

## 2023-11-08 DIAGNOSIS — L89.90 PRESSURE ULCER OF UNSPECIFIED SITE, UNSPECIFIED STAGE: ICD-10-CM

## 2023-11-08 DIAGNOSIS — E53.8 DEFICIENCY OF OTHER SPECIFIED B GROUP VITAMINS: ICD-10-CM

## 2023-11-08 PROCEDURE — 99213 OFFICE O/P EST LOW 20 MIN: CPT | Mod: 95

## 2023-11-10 ENCOUNTER — LABORATORY RESULT (OUTPATIENT)
Age: 73
End: 2023-11-10

## 2023-11-19 ENCOUNTER — INPATIENT (INPATIENT)
Facility: HOSPITAL | Age: 73
LOS: 4 days | Discharge: HOME | DRG: 177 | End: 2023-11-24
Attending: HOSPITALIST | Admitting: STUDENT IN AN ORGANIZED HEALTH CARE EDUCATION/TRAINING PROGRAM
Payer: COMMERCIAL

## 2023-11-19 VITALS
WEIGHT: 190.04 LBS | HEART RATE: 80 BPM | OXYGEN SATURATION: 95 % | SYSTOLIC BLOOD PRESSURE: 156 MMHG | TEMPERATURE: 98 F | DIASTOLIC BLOOD PRESSURE: 84 MMHG | RESPIRATION RATE: 18 BRPM | HEIGHT: 66 IN

## 2023-11-19 DIAGNOSIS — Z98.890 OTHER SPECIFIED POSTPROCEDURAL STATES: Chronic | ICD-10-CM

## 2023-11-19 LAB
ALBUMIN SERPL ELPH-MCNC: 3.1 G/DL — LOW (ref 3.3–5)
ALBUMIN SERPL ELPH-MCNC: 3.1 G/DL — LOW (ref 3.3–5)
ALP SERPL-CCNC: 89 U/L — SIGNIFICANT CHANGE UP (ref 40–120)
ALP SERPL-CCNC: 89 U/L — SIGNIFICANT CHANGE UP (ref 40–120)
ALT FLD-CCNC: 13 U/L — SIGNIFICANT CHANGE UP (ref 12–78)
ALT FLD-CCNC: 13 U/L — SIGNIFICANT CHANGE UP (ref 12–78)
ANION GAP SERPL CALC-SCNC: 7 MMOL/L — SIGNIFICANT CHANGE UP (ref 5–17)
ANION GAP SERPL CALC-SCNC: 7 MMOL/L — SIGNIFICANT CHANGE UP (ref 5–17)
APTT BLD: 27.9 SEC — SIGNIFICANT CHANGE UP (ref 24.5–35.6)
APTT BLD: 27.9 SEC — SIGNIFICANT CHANGE UP (ref 24.5–35.6)
AST SERPL-CCNC: 10 U/L — LOW (ref 15–37)
AST SERPL-CCNC: 10 U/L — LOW (ref 15–37)
BASOPHILS # BLD AUTO: 0.05 K/UL — SIGNIFICANT CHANGE UP (ref 0–0.2)
BASOPHILS # BLD AUTO: 0.05 K/UL — SIGNIFICANT CHANGE UP (ref 0–0.2)
BASOPHILS NFR BLD AUTO: 0.2 % — SIGNIFICANT CHANGE UP (ref 0–2)
BASOPHILS NFR BLD AUTO: 0.2 % — SIGNIFICANT CHANGE UP (ref 0–2)
BILIRUB SERPL-MCNC: 0.6 MG/DL — SIGNIFICANT CHANGE UP (ref 0.2–1.2)
BILIRUB SERPL-MCNC: 0.6 MG/DL — SIGNIFICANT CHANGE UP (ref 0.2–1.2)
BUN SERPL-MCNC: 17 MG/DL — SIGNIFICANT CHANGE UP (ref 7–23)
BUN SERPL-MCNC: 17 MG/DL — SIGNIFICANT CHANGE UP (ref 7–23)
CALCIUM SERPL-MCNC: 8.5 MG/DL — SIGNIFICANT CHANGE UP (ref 8.5–10.1)
CALCIUM SERPL-MCNC: 8.5 MG/DL — SIGNIFICANT CHANGE UP (ref 8.5–10.1)
CHLORIDE SERPL-SCNC: 106 MMOL/L — SIGNIFICANT CHANGE UP (ref 96–108)
CHLORIDE SERPL-SCNC: 106 MMOL/L — SIGNIFICANT CHANGE UP (ref 96–108)
CO2 SERPL-SCNC: 30 MMOL/L — SIGNIFICANT CHANGE UP (ref 22–31)
CO2 SERPL-SCNC: 30 MMOL/L — SIGNIFICANT CHANGE UP (ref 22–31)
CREAT SERPL-MCNC: 1 MG/DL — SIGNIFICANT CHANGE UP (ref 0.5–1.3)
CREAT SERPL-MCNC: 1 MG/DL — SIGNIFICANT CHANGE UP (ref 0.5–1.3)
EGFR: 80 ML/MIN/1.73M2 — SIGNIFICANT CHANGE UP
EGFR: 80 ML/MIN/1.73M2 — SIGNIFICANT CHANGE UP
EOSINOPHIL # BLD AUTO: 0.03 K/UL — SIGNIFICANT CHANGE UP (ref 0–0.5)
EOSINOPHIL # BLD AUTO: 0.03 K/UL — SIGNIFICANT CHANGE UP (ref 0–0.5)
EOSINOPHIL NFR BLD AUTO: 0.1 % — SIGNIFICANT CHANGE UP (ref 0–6)
EOSINOPHIL NFR BLD AUTO: 0.1 % — SIGNIFICANT CHANGE UP (ref 0–6)
GLUCOSE SERPL-MCNC: 106 MG/DL — HIGH (ref 70–99)
GLUCOSE SERPL-MCNC: 106 MG/DL — HIGH (ref 70–99)
HCT VFR BLD CALC: 32.9 % — LOW (ref 39–50)
HCT VFR BLD CALC: 32.9 % — LOW (ref 39–50)
HGB BLD-MCNC: 10.7 G/DL — LOW (ref 13–17)
HGB BLD-MCNC: 10.7 G/DL — LOW (ref 13–17)
IMM GRANULOCYTES NFR BLD AUTO: 0.5 % — SIGNIFICANT CHANGE UP (ref 0–0.9)
IMM GRANULOCYTES NFR BLD AUTO: 0.5 % — SIGNIFICANT CHANGE UP (ref 0–0.9)
INR BLD: 1.13 RATIO — SIGNIFICANT CHANGE UP (ref 0.85–1.18)
INR BLD: 1.13 RATIO — SIGNIFICANT CHANGE UP (ref 0.85–1.18)
LACTATE SERPL-SCNC: 1.9 MMOL/L — SIGNIFICANT CHANGE UP (ref 0.7–2)
LACTATE SERPL-SCNC: 1.9 MMOL/L — SIGNIFICANT CHANGE UP (ref 0.7–2)
LYMPHOCYTES # BLD AUTO: 1.13 K/UL — SIGNIFICANT CHANGE UP (ref 1–3.3)
LYMPHOCYTES # BLD AUTO: 1.13 K/UL — SIGNIFICANT CHANGE UP (ref 1–3.3)
LYMPHOCYTES # BLD AUTO: 5.4 % — LOW (ref 13–44)
LYMPHOCYTES # BLD AUTO: 5.4 % — LOW (ref 13–44)
MAGNESIUM SERPL-MCNC: 2 MG/DL — SIGNIFICANT CHANGE UP (ref 1.6–2.6)
MAGNESIUM SERPL-MCNC: 2 MG/DL — SIGNIFICANT CHANGE UP (ref 1.6–2.6)
MCHC RBC-ENTMCNC: 26.7 PG — LOW (ref 27–34)
MCHC RBC-ENTMCNC: 26.7 PG — LOW (ref 27–34)
MCHC RBC-ENTMCNC: 32.5 GM/DL — SIGNIFICANT CHANGE UP (ref 32–36)
MCHC RBC-ENTMCNC: 32.5 GM/DL — SIGNIFICANT CHANGE UP (ref 32–36)
MCV RBC AUTO: 82 FL — SIGNIFICANT CHANGE UP (ref 80–100)
MCV RBC AUTO: 82 FL — SIGNIFICANT CHANGE UP (ref 80–100)
MONOCYTES # BLD AUTO: 0.81 K/UL — SIGNIFICANT CHANGE UP (ref 0–0.9)
MONOCYTES # BLD AUTO: 0.81 K/UL — SIGNIFICANT CHANGE UP (ref 0–0.9)
MONOCYTES NFR BLD AUTO: 3.9 % — SIGNIFICANT CHANGE UP (ref 2–14)
MONOCYTES NFR BLD AUTO: 3.9 % — SIGNIFICANT CHANGE UP (ref 2–14)
NEUTROPHILS # BLD AUTO: 18.66 K/UL — HIGH (ref 1.8–7.4)
NEUTROPHILS # BLD AUTO: 18.66 K/UL — HIGH (ref 1.8–7.4)
NEUTROPHILS NFR BLD AUTO: 89.9 % — HIGH (ref 43–77)
NEUTROPHILS NFR BLD AUTO: 89.9 % — HIGH (ref 43–77)
NRBC # BLD: 0 /100 WBCS — SIGNIFICANT CHANGE UP (ref 0–0)
NRBC # BLD: 0 /100 WBCS — SIGNIFICANT CHANGE UP (ref 0–0)
NT-PROBNP SERPL-SCNC: 1166 PG/ML — HIGH (ref 0–125)
NT-PROBNP SERPL-SCNC: 1166 PG/ML — HIGH (ref 0–125)
PLATELET # BLD AUTO: 243 K/UL — SIGNIFICANT CHANGE UP (ref 150–400)
PLATELET # BLD AUTO: 243 K/UL — SIGNIFICANT CHANGE UP (ref 150–400)
POTASSIUM SERPL-MCNC: 3.9 MMOL/L — SIGNIFICANT CHANGE UP (ref 3.5–5.3)
POTASSIUM SERPL-MCNC: 3.9 MMOL/L — SIGNIFICANT CHANGE UP (ref 3.5–5.3)
POTASSIUM SERPL-SCNC: 3.9 MMOL/L — SIGNIFICANT CHANGE UP (ref 3.5–5.3)
POTASSIUM SERPL-SCNC: 3.9 MMOL/L — SIGNIFICANT CHANGE UP (ref 3.5–5.3)
PROCALCITONIN SERPL-MCNC: 0.25 NG/ML — HIGH
PROCALCITONIN SERPL-MCNC: 0.25 NG/ML — HIGH
PROT SERPL-MCNC: 6.3 G/DL — SIGNIFICANT CHANGE UP (ref 6–8.3)
PROT SERPL-MCNC: 6.3 G/DL — SIGNIFICANT CHANGE UP (ref 6–8.3)
PROTHROM AB SERPL-ACNC: 13.2 SEC — HIGH (ref 9.5–13)
PROTHROM AB SERPL-ACNC: 13.2 SEC — HIGH (ref 9.5–13)
RAPID RVP RESULT: SIGNIFICANT CHANGE UP
RAPID RVP RESULT: SIGNIFICANT CHANGE UP
RBC # BLD: 4.01 M/UL — LOW (ref 4.2–5.8)
RBC # BLD: 4.01 M/UL — LOW (ref 4.2–5.8)
RBC # FLD: 15.1 % — HIGH (ref 10.3–14.5)
RBC # FLD: 15.1 % — HIGH (ref 10.3–14.5)
SARS-COV-2 RNA SPEC QL NAA+PROBE: SIGNIFICANT CHANGE UP
SARS-COV-2 RNA SPEC QL NAA+PROBE: SIGNIFICANT CHANGE UP
SODIUM SERPL-SCNC: 143 MMOL/L — SIGNIFICANT CHANGE UP (ref 135–145)
SODIUM SERPL-SCNC: 143 MMOL/L — SIGNIFICANT CHANGE UP (ref 135–145)
WBC # BLD: 20.79 K/UL — HIGH (ref 3.8–10.5)
WBC # BLD: 20.79 K/UL — HIGH (ref 3.8–10.5)
WBC # FLD AUTO: 20.79 K/UL — HIGH (ref 3.8–10.5)
WBC # FLD AUTO: 20.79 K/UL — HIGH (ref 3.8–10.5)

## 2023-11-19 PROCEDURE — 74176 CT ABD & PELVIS W/O CONTRAST: CPT | Mod: 26,MA

## 2023-11-19 PROCEDURE — 93010 ELECTROCARDIOGRAM REPORT: CPT

## 2023-11-19 PROCEDURE — 70450 CT HEAD/BRAIN W/O DYE: CPT | Mod: 26,MA

## 2023-11-19 PROCEDURE — 99285 EMERGENCY DEPT VISIT HI MDM: CPT

## 2023-11-19 PROCEDURE — 71250 CT THORAX DX C-: CPT | Mod: 26,MA

## 2023-11-19 PROCEDURE — 71045 X-RAY EXAM CHEST 1 VIEW: CPT | Mod: 26

## 2023-11-19 RX ORDER — PIPERACILLIN AND TAZOBACTAM 4; .5 G/20ML; G/20ML
3.38 INJECTION, POWDER, LYOPHILIZED, FOR SOLUTION INTRAVENOUS ONCE
Refills: 0 | Status: COMPLETED | OUTPATIENT
Start: 2023-11-19 | End: 2023-11-19

## 2023-11-19 RX ORDER — IPRATROPIUM/ALBUTEROL SULFATE 18-103MCG
3 AEROSOL WITH ADAPTER (GRAM) INHALATION ONCE
Refills: 0 | Status: COMPLETED | OUTPATIENT
Start: 2023-11-19 | End: 2023-11-19

## 2023-11-19 RX ORDER — SODIUM CHLORIDE 9 MG/ML
2000 INJECTION INTRAMUSCULAR; INTRAVENOUS; SUBCUTANEOUS ONCE
Refills: 0 | Status: COMPLETED | OUTPATIENT
Start: 2023-11-19 | End: 2023-11-19

## 2023-11-19 RX ADMIN — Medication 3 MILLILITER(S): at 22:30

## 2023-11-19 RX ADMIN — SODIUM CHLORIDE 2000 MILLILITER(S): 9 INJECTION INTRAMUSCULAR; INTRAVENOUS; SUBCUTANEOUS at 22:39

## 2023-11-19 RX ADMIN — PIPERACILLIN AND TAZOBACTAM 200 GRAM(S): 4; .5 INJECTION, POWDER, LYOPHILIZED, FOR SOLUTION INTRAVENOUS at 22:30

## 2023-11-19 NOTE — ED PROVIDER NOTE - OBJECTIVE STATEMENT
Patient is a 73-year-old male who presents emergency room with a chief complaint of cough concern for possible aspiration pneumonia.  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  At baseline patient is alert and oriented to person occasionally will answer some questions.  Wife at bedside provides history.  Patient was last admitted to the hospital from September 25 to September 30, 2023 with vomiting and diarrhea.  Patient was found to have severe lactic acidosis with a lactate of 4 and rapid A-fib.  Received medication for rate control and broad-spectrum antibiotics.  Ultimately was admitted to ICU for severe sepsis likely secondary to retrosigmoid colitis and suspected aspiration pneumonia.  Also believe the patient may have had metformin induced lactic acidosis.  Lactic acidosis resolved.  Patient became more alert.  Patient presents today with worsening cough.  Wife reports that patient has been off metformin since his discharge.  She reports that today she noted that his cough seemed to worsen.  To be in pain when coughing.  She also noted patient appeared to be less responsive than normal.  Is been no nausea vomiting or diarrhea.

## 2023-11-19 NOTE — ED ADULT TRIAGE NOTE - CHIEF COMPLAINT QUOTE
Pt with Hx TBI, aspiration pneumonia, sent in noted more lethargic, as per family thinks he has pneumonia again.

## 2023-11-19 NOTE — ED ADULT NURSE NOTE - NSFALLHARMRISKINTERV_ED_ALL_ED

## 2023-11-19 NOTE — ED PROVIDER NOTE - CLINICAL SUMMARY MEDICAL DECISION MAKING FREE TEXT BOX
Patient is a 73-year-old male who presents emergency room with a chief complaint of cough concern for possible aspiration pneumonia.  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  At baseline patient is alert and oriented to person occasionally will answer some questions.  Wife at bedside provides history.  Patient was last admitted to the hospital from September 25 to September 30, 2023 with vomiting and diarrhea.  Patient was found to have severe lactic acidosis with a lactate of 4 and rapid A-fib.  Received medication for rate control and broad-spectrum antibiotics.  Ultimately was admitted to ICU for severe sepsis likely secondary to retrosigmoid colitis and suspected aspiration pneumonia.  Also believe the patient may have had metformin induced lactic acidosis.  Lactic acidosis resolved.  Patient became more alert.  Patient presents today with worsening cough.  Wife reports that patient has been off metformin since his discharge.  She reports that today she noted that his cough seemed to worsen.  To be in pain when coughing.  She also noted patient appeared to be less responsive than normal.  Is been no nausea vomiting or diarrhea. Patient is a 73-year-old male who presents emergency room with a chief complaint of cough concern for possible aspiration pneumonia.  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  At baseline patient is alert and oriented to person occasionally will answer some questions.  Wife at bedside provides history.  Patient was last admitted to the hospital from September 25 to September 30, 2023 with vomiting and diarrhea.  Patient was found to have severe lactic acidosis with a lactate of 4 and rapid A-fib.  Received medication for rate control and broad-spectrum antibiotics.  Ultimately was admitted to ICU for severe sepsis likely secondary to retrosigmoid colitis and suspected aspiration pneumonia.  Also believe the patient may have had metformin induced lactic acidosis.  Lactic acidosis resolved.  Patient became more alert.  Patient presents today with worsening cough.  Wife reports that patient has been off metformin since his discharge.  She reports that today she noted that his cough seemed to worsen.  To be in pain when coughing.  She also noted patient appeared to be less responsive than normal.  Is been no nausea vomiting or diarrhea.   Independent review EKG reveals atrial fibrillation at a rate of 88 bpm and incomplete right bundle branch block

## 2023-11-20 DIAGNOSIS — Z29.9 ENCOUNTER FOR PROPHYLACTIC MEASURES, UNSPECIFIED: ICD-10-CM

## 2023-11-20 DIAGNOSIS — J18.9 PNEUMONIA, UNSPECIFIED ORGANISM: ICD-10-CM

## 2023-11-20 DIAGNOSIS — F32.9 MAJOR DEPRESSIVE DISORDER, SINGLE EPISODE, UNSPECIFIED: ICD-10-CM

## 2023-11-20 DIAGNOSIS — I10 ESSENTIAL (PRIMARY) HYPERTENSION: ICD-10-CM

## 2023-11-20 DIAGNOSIS — J69.0 PNEUMONITIS DUE TO INHALATION OF FOOD AND VOMIT: ICD-10-CM

## 2023-11-20 DIAGNOSIS — E11.9 TYPE 2 DIABETES MELLITUS WITHOUT COMPLICATIONS: ICD-10-CM

## 2023-11-20 DIAGNOSIS — D72.829 ELEVATED WHITE BLOOD CELL COUNT, UNSPECIFIED: ICD-10-CM

## 2023-11-20 DIAGNOSIS — I48.91 UNSPECIFIED ATRIAL FIBRILLATION: ICD-10-CM

## 2023-11-20 DIAGNOSIS — R13.10 DYSPHAGIA, UNSPECIFIED: ICD-10-CM

## 2023-11-20 DIAGNOSIS — D64.9 ANEMIA, UNSPECIFIED: ICD-10-CM

## 2023-11-20 DIAGNOSIS — S06.9X9A UNSPECIFIED INTRACRANIAL INJURY WITH LOSS OF CONSCIOUSNESS OF UNSPECIFIED DURATION, INITIAL ENCOUNTER: ICD-10-CM

## 2023-11-20 LAB
A1C WITH ESTIMATED AVERAGE GLUCOSE RESULT: 5.5 % — SIGNIFICANT CHANGE UP (ref 4–5.6)
A1C WITH ESTIMATED AVERAGE GLUCOSE RESULT: 5.5 % — SIGNIFICANT CHANGE UP (ref 4–5.6)
ALBUMIN SERPL ELPH-MCNC: 2.9 G/DL — LOW (ref 3.3–5)
ALBUMIN SERPL ELPH-MCNC: 2.9 G/DL — LOW (ref 3.3–5)
ALP SERPL-CCNC: 83 U/L — SIGNIFICANT CHANGE UP (ref 40–120)
ALP SERPL-CCNC: 83 U/L — SIGNIFICANT CHANGE UP (ref 40–120)
ALT FLD-CCNC: 13 U/L — SIGNIFICANT CHANGE UP (ref 12–78)
ALT FLD-CCNC: 13 U/L — SIGNIFICANT CHANGE UP (ref 12–78)
ANION GAP SERPL CALC-SCNC: 6 MMOL/L — SIGNIFICANT CHANGE UP (ref 5–17)
ANION GAP SERPL CALC-SCNC: 6 MMOL/L — SIGNIFICANT CHANGE UP (ref 5–17)
AST SERPL-CCNC: 9 U/L — LOW (ref 15–37)
AST SERPL-CCNC: 9 U/L — LOW (ref 15–37)
BASOPHILS # BLD AUTO: 0.05 K/UL — SIGNIFICANT CHANGE UP (ref 0–0.2)
BASOPHILS # BLD AUTO: 0.05 K/UL — SIGNIFICANT CHANGE UP (ref 0–0.2)
BASOPHILS NFR BLD AUTO: 0.4 % — SIGNIFICANT CHANGE UP (ref 0–2)
BASOPHILS NFR BLD AUTO: 0.4 % — SIGNIFICANT CHANGE UP (ref 0–2)
BILIRUB SERPL-MCNC: 0.7 MG/DL — SIGNIFICANT CHANGE UP (ref 0.2–1.2)
BILIRUB SERPL-MCNC: 0.7 MG/DL — SIGNIFICANT CHANGE UP (ref 0.2–1.2)
BUN SERPL-MCNC: 15 MG/DL — SIGNIFICANT CHANGE UP (ref 7–23)
BUN SERPL-MCNC: 15 MG/DL — SIGNIFICANT CHANGE UP (ref 7–23)
CALCIUM SERPL-MCNC: 8 MG/DL — LOW (ref 8.5–10.1)
CALCIUM SERPL-MCNC: 8 MG/DL — LOW (ref 8.5–10.1)
CHLORIDE SERPL-SCNC: 113 MMOL/L — HIGH (ref 96–108)
CHLORIDE SERPL-SCNC: 113 MMOL/L — HIGH (ref 96–108)
CO2 SERPL-SCNC: 27 MMOL/L — SIGNIFICANT CHANGE UP (ref 22–31)
CO2 SERPL-SCNC: 27 MMOL/L — SIGNIFICANT CHANGE UP (ref 22–31)
CREAT SERPL-MCNC: 0.96 MG/DL — SIGNIFICANT CHANGE UP (ref 0.5–1.3)
CREAT SERPL-MCNC: 0.96 MG/DL — SIGNIFICANT CHANGE UP (ref 0.5–1.3)
EGFR: 84 ML/MIN/1.73M2 — SIGNIFICANT CHANGE UP
EGFR: 84 ML/MIN/1.73M2 — SIGNIFICANT CHANGE UP
EOSINOPHIL # BLD AUTO: 0.06 K/UL — SIGNIFICANT CHANGE UP (ref 0–0.5)
EOSINOPHIL # BLD AUTO: 0.06 K/UL — SIGNIFICANT CHANGE UP (ref 0–0.5)
EOSINOPHIL NFR BLD AUTO: 0.5 % — SIGNIFICANT CHANGE UP (ref 0–6)
EOSINOPHIL NFR BLD AUTO: 0.5 % — SIGNIFICANT CHANGE UP (ref 0–6)
ESTIMATED AVERAGE GLUCOSE: 111 MG/DL — SIGNIFICANT CHANGE UP (ref 68–114)
ESTIMATED AVERAGE GLUCOSE: 111 MG/DL — SIGNIFICANT CHANGE UP (ref 68–114)
FERRITIN SERPL-MCNC: 90 NG/ML — SIGNIFICANT CHANGE UP (ref 30–400)
FERRITIN SERPL-MCNC: 90 NG/ML — SIGNIFICANT CHANGE UP (ref 30–400)
GLUCOSE SERPL-MCNC: 113 MG/DL — HIGH (ref 70–99)
GLUCOSE SERPL-MCNC: 113 MG/DL — HIGH (ref 70–99)
HCT VFR BLD CALC: 33.8 % — LOW (ref 39–50)
HCT VFR BLD CALC: 33.8 % — LOW (ref 39–50)
HGB BLD-MCNC: 11.2 G/DL — LOW (ref 13–17)
HGB BLD-MCNC: 11.2 G/DL — LOW (ref 13–17)
IMM GRANULOCYTES NFR BLD AUTO: 0.3 % — SIGNIFICANT CHANGE UP (ref 0–0.9)
IMM GRANULOCYTES NFR BLD AUTO: 0.3 % — SIGNIFICANT CHANGE UP (ref 0–0.9)
IRON SATN MFR SERPL: 25 UG/DL — LOW (ref 45–165)
IRON SATN MFR SERPL: 25 UG/DL — LOW (ref 45–165)
IRON SATN MFR SERPL: 9 % — LOW (ref 16–55)
IRON SATN MFR SERPL: 9 % — LOW (ref 16–55)
LYMPHOCYTES # BLD AUTO: 1.15 K/UL — SIGNIFICANT CHANGE UP (ref 1–3.3)
LYMPHOCYTES # BLD AUTO: 1.15 K/UL — SIGNIFICANT CHANGE UP (ref 1–3.3)
LYMPHOCYTES # BLD AUTO: 9.6 % — LOW (ref 13–44)
LYMPHOCYTES # BLD AUTO: 9.6 % — LOW (ref 13–44)
MCHC RBC-ENTMCNC: 27.4 PG — SIGNIFICANT CHANGE UP (ref 27–34)
MCHC RBC-ENTMCNC: 27.4 PG — SIGNIFICANT CHANGE UP (ref 27–34)
MCHC RBC-ENTMCNC: 33.1 GM/DL — SIGNIFICANT CHANGE UP (ref 32–36)
MCHC RBC-ENTMCNC: 33.1 GM/DL — SIGNIFICANT CHANGE UP (ref 32–36)
MCV RBC AUTO: 82.6 FL — SIGNIFICANT CHANGE UP (ref 80–100)
MCV RBC AUTO: 82.6 FL — SIGNIFICANT CHANGE UP (ref 80–100)
MONOCYTES # BLD AUTO: 0.54 K/UL — SIGNIFICANT CHANGE UP (ref 0–0.9)
MONOCYTES # BLD AUTO: 0.54 K/UL — SIGNIFICANT CHANGE UP (ref 0–0.9)
MONOCYTES NFR BLD AUTO: 4.5 % — SIGNIFICANT CHANGE UP (ref 2–14)
MONOCYTES NFR BLD AUTO: 4.5 % — SIGNIFICANT CHANGE UP (ref 2–14)
NEUTROPHILS # BLD AUTO: 10.2 K/UL — HIGH (ref 1.8–7.4)
NEUTROPHILS # BLD AUTO: 10.2 K/UL — HIGH (ref 1.8–7.4)
NEUTROPHILS NFR BLD AUTO: 84.7 % — HIGH (ref 43–77)
NEUTROPHILS NFR BLD AUTO: 84.7 % — HIGH (ref 43–77)
NRBC # BLD: 0 /100 WBCS — SIGNIFICANT CHANGE UP (ref 0–0)
NRBC # BLD: 0 /100 WBCS — SIGNIFICANT CHANGE UP (ref 0–0)
PLATELET # BLD AUTO: 126 K/UL — LOW (ref 150–400)
PLATELET # BLD AUTO: 126 K/UL — LOW (ref 150–400)
POTASSIUM SERPL-MCNC: 3.5 MMOL/L — SIGNIFICANT CHANGE UP (ref 3.5–5.3)
POTASSIUM SERPL-MCNC: 3.5 MMOL/L — SIGNIFICANT CHANGE UP (ref 3.5–5.3)
POTASSIUM SERPL-SCNC: 3.5 MMOL/L — SIGNIFICANT CHANGE UP (ref 3.5–5.3)
POTASSIUM SERPL-SCNC: 3.5 MMOL/L — SIGNIFICANT CHANGE UP (ref 3.5–5.3)
PROT SERPL-MCNC: 5.8 G/DL — LOW (ref 6–8.3)
PROT SERPL-MCNC: 5.8 G/DL — LOW (ref 6–8.3)
RBC # BLD: 4.09 M/UL — LOW (ref 4.2–5.8)
RBC # BLD: 4.09 M/UL — LOW (ref 4.2–5.8)
RBC # FLD: 14.9 % — HIGH (ref 10.3–14.5)
RBC # FLD: 14.9 % — HIGH (ref 10.3–14.5)
SODIUM SERPL-SCNC: 146 MMOL/L — HIGH (ref 135–145)
SODIUM SERPL-SCNC: 146 MMOL/L — HIGH (ref 135–145)
TIBC SERPL-MCNC: 269 UG/DL — SIGNIFICANT CHANGE UP (ref 220–430)
TIBC SERPL-MCNC: 269 UG/DL — SIGNIFICANT CHANGE UP (ref 220–430)
TRANSFERRIN SERPL-MCNC: 194 MG/DL — LOW (ref 200–360)
TRANSFERRIN SERPL-MCNC: 194 MG/DL — LOW (ref 200–360)
UIBC SERPL-MCNC: 244 UG/DL — SIGNIFICANT CHANGE UP (ref 110–370)
UIBC SERPL-MCNC: 244 UG/DL — SIGNIFICANT CHANGE UP (ref 110–370)
WBC # BLD: 12.04 K/UL — HIGH (ref 3.8–10.5)
WBC # BLD: 12.04 K/UL — HIGH (ref 3.8–10.5)
WBC # FLD AUTO: 12.04 K/UL — HIGH (ref 3.8–10.5)
WBC # FLD AUTO: 12.04 K/UL — HIGH (ref 3.8–10.5)

## 2023-11-20 PROCEDURE — 73060 X-RAY EXAM OF HUMERUS: CPT | Mod: 26

## 2023-11-20 PROCEDURE — 99223 1ST HOSP IP/OBS HIGH 75: CPT | Mod: GC

## 2023-11-20 RX ORDER — SODIUM CHLORIDE 9 MG/ML
1000 INJECTION, SOLUTION INTRAVENOUS
Refills: 0 | Status: DISCONTINUED | OUTPATIENT
Start: 2023-11-20 | End: 2023-11-24

## 2023-11-20 RX ORDER — MODAFINIL 200 MG/1
100 TABLET ORAL DAILY
Refills: 0 | Status: DISCONTINUED | OUTPATIENT
Start: 2023-11-20 | End: 2023-11-24

## 2023-11-20 RX ORDER — INSULIN LISPRO 100/ML
VIAL (ML) SUBCUTANEOUS EVERY 6 HOURS
Refills: 0 | Status: DISCONTINUED | OUTPATIENT
Start: 2023-11-20 | End: 2023-11-20

## 2023-11-20 RX ORDER — SENNA PLUS 8.6 MG/1
2 TABLET ORAL AT BEDTIME
Refills: 0 | Status: DISCONTINUED | OUTPATIENT
Start: 2023-11-20 | End: 2023-11-24

## 2023-11-20 RX ORDER — HYDRALAZINE HCL 50 MG
25 TABLET ORAL THREE TIMES A DAY
Refills: 0 | Status: DISCONTINUED | OUTPATIENT
Start: 2023-11-20 | End: 2023-11-24

## 2023-11-20 RX ORDER — DILTIAZEM HCL 120 MG
1 CAPSULE, EXT RELEASE 24 HR ORAL
Refills: 0 | DISCHARGE

## 2023-11-20 RX ORDER — DEXTROSE 50 % IN WATER 50 %
12.5 SYRINGE (ML) INTRAVENOUS ONCE
Refills: 0 | Status: DISCONTINUED | OUTPATIENT
Start: 2023-11-20 | End: 2023-11-24

## 2023-11-20 RX ORDER — GABAPENTIN 400 MG/1
0.5 CAPSULE ORAL
Qty: 0 | Refills: 0 | DISCHARGE

## 2023-11-20 RX ORDER — DEXTROSE 50 % IN WATER 50 %
15 SYRINGE (ML) INTRAVENOUS ONCE
Refills: 0 | Status: DISCONTINUED | OUTPATIENT
Start: 2023-11-20 | End: 2023-11-24

## 2023-11-20 RX ORDER — PANTOPRAZOLE SODIUM 20 MG/1
40 TABLET, DELAYED RELEASE ORAL
Refills: 0 | Status: DISCONTINUED | OUTPATIENT
Start: 2023-11-20 | End: 2023-11-24

## 2023-11-20 RX ORDER — DILTIAZEM HCL 120 MG
240 CAPSULE, EXT RELEASE 24 HR ORAL DAILY
Refills: 0 | Status: DISCONTINUED | OUTPATIENT
Start: 2023-11-20 | End: 2023-11-24

## 2023-11-20 RX ORDER — GABAPENTIN 400 MG/1
400 CAPSULE ORAL THREE TIMES A DAY
Refills: 0 | Status: DISCONTINUED | OUTPATIENT
Start: 2023-11-20 | End: 2023-11-24

## 2023-11-20 RX ORDER — DEXTROSE 50 % IN WATER 50 %
25 SYRINGE (ML) INTRAVENOUS ONCE
Refills: 0 | Status: DISCONTINUED | OUTPATIENT
Start: 2023-11-20 | End: 2023-11-24

## 2023-11-20 RX ORDER — PIPERACILLIN AND TAZOBACTAM 4; .5 G/20ML; G/20ML
3.38 INJECTION, POWDER, LYOPHILIZED, FOR SOLUTION INTRAVENOUS EVERY 8 HOURS
Refills: 0 | Status: DISCONTINUED | OUTPATIENT
Start: 2023-11-20 | End: 2023-11-22

## 2023-11-20 RX ORDER — INFLUENZA VIRUS VACCINE 15; 15; 15; 15 UG/.5ML; UG/.5ML; UG/.5ML; UG/.5ML
0.7 SUSPENSION INTRAMUSCULAR ONCE
Refills: 0 | Status: DISCONTINUED | OUTPATIENT
Start: 2023-11-20 | End: 2023-11-24

## 2023-11-20 RX ORDER — GLUCAGON INJECTION, SOLUTION 0.5 MG/.1ML
1 INJECTION, SOLUTION SUBCUTANEOUS ONCE
Refills: 0 | Status: DISCONTINUED | OUTPATIENT
Start: 2023-11-20 | End: 2023-11-24

## 2023-11-20 RX ORDER — LATANOPROST 0.05 MG/ML
1 SOLUTION/ DROPS OPHTHALMIC; TOPICAL AT BEDTIME
Refills: 0 | Status: DISCONTINUED | OUTPATIENT
Start: 2023-11-20 | End: 2023-11-24

## 2023-11-20 RX ORDER — MIRTAZAPINE 45 MG/1
7.5 TABLET, ORALLY DISINTEGRATING ORAL DAILY
Refills: 0 | Status: DISCONTINUED | OUTPATIENT
Start: 2023-11-20 | End: 2023-11-24

## 2023-11-20 RX ORDER — INSULIN LISPRO 100/ML
VIAL (ML) SUBCUTANEOUS AT BEDTIME
Refills: 0 | Status: DISCONTINUED | OUTPATIENT
Start: 2023-11-20 | End: 2023-11-24

## 2023-11-20 RX ORDER — SODIUM CHLORIDE 9 MG/ML
1000 INJECTION INTRAMUSCULAR; INTRAVENOUS; SUBCUTANEOUS
Refills: 0 | Status: DISCONTINUED | OUTPATIENT
Start: 2023-11-20 | End: 2023-11-24

## 2023-11-20 RX ORDER — APIXABAN 2.5 MG/1
2.5 TABLET, FILM COATED ORAL EVERY 12 HOURS
Refills: 0 | Status: DISCONTINUED | OUTPATIENT
Start: 2023-11-20 | End: 2023-11-24

## 2023-11-20 RX ORDER — INSULIN LISPRO 100/ML
VIAL (ML) SUBCUTANEOUS
Refills: 0 | Status: DISCONTINUED | OUTPATIENT
Start: 2023-11-20 | End: 2023-11-24

## 2023-11-20 RX ADMIN — Medication 1: at 22:07

## 2023-11-20 RX ADMIN — Medication 25 MILLIGRAM(S): at 13:11

## 2023-11-20 RX ADMIN — Medication 25 MILLIGRAM(S): at 22:07

## 2023-11-20 RX ADMIN — SENNA PLUS 2 TABLET(S): 8.6 TABLET ORAL at 22:07

## 2023-11-20 RX ADMIN — PIPERACILLIN AND TAZOBACTAM 25 GRAM(S): 4; .5 INJECTION, POWDER, LYOPHILIZED, FOR SOLUTION INTRAVENOUS at 18:12

## 2023-11-20 RX ADMIN — APIXABAN 2.5 MILLIGRAM(S): 2.5 TABLET, FILM COATED ORAL at 12:35

## 2023-11-20 RX ADMIN — PANTOPRAZOLE SODIUM 40 MILLIGRAM(S): 20 TABLET, DELAYED RELEASE ORAL at 12:36

## 2023-11-20 RX ADMIN — PIPERACILLIN AND TAZOBACTAM 25 GRAM(S): 4; .5 INJECTION, POWDER, LYOPHILIZED, FOR SOLUTION INTRAVENOUS at 06:25

## 2023-11-20 RX ADMIN — PIPERACILLIN AND TAZOBACTAM 25 GRAM(S): 4; .5 INJECTION, POWDER, LYOPHILIZED, FOR SOLUTION INTRAVENOUS at 22:03

## 2023-11-20 RX ADMIN — APIXABAN 2.5 MILLIGRAM(S): 2.5 TABLET, FILM COATED ORAL at 19:45

## 2023-11-20 RX ADMIN — Medication 125 MILLIGRAM(S): at 00:09

## 2023-11-20 RX ADMIN — MIRTAZAPINE 7.5 MILLIGRAM(S): 45 TABLET, ORALLY DISINTEGRATING ORAL at 12:36

## 2023-11-20 RX ADMIN — Medication 240 MILLIGRAM(S): at 12:35

## 2023-11-20 RX ADMIN — SODIUM CHLORIDE 75 MILLILITER(S): 9 INJECTION INTRAMUSCULAR; INTRAVENOUS; SUBCUTANEOUS at 06:25

## 2023-11-20 RX ADMIN — Medication 1: at 12:35

## 2023-11-20 RX ADMIN — GABAPENTIN 400 MILLIGRAM(S): 400 CAPSULE ORAL at 22:07

## 2023-11-20 RX ADMIN — SODIUM CHLORIDE 75 MILLILITER(S): 9 INJECTION INTRAMUSCULAR; INTRAVENOUS; SUBCUTANEOUS at 05:31

## 2023-11-20 RX ADMIN — GABAPENTIN 400 MILLIGRAM(S): 400 CAPSULE ORAL at 13:11

## 2023-11-20 RX ADMIN — LATANOPROST 1 DROP(S): 0.05 SOLUTION/ DROPS OPHTHALMIC; TOPICAL at 22:08

## 2023-11-20 RX ADMIN — MODAFINIL 100 MILLIGRAM(S): 200 TABLET ORAL at 12:35

## 2023-11-20 NOTE — CONSULT NOTE ADULT - SUBJECTIVE AND OBJECTIVE BOX
[facility] of INFECTIOUS DISEASE  Ovidio Manning MD PhD, Jojo Boggs MD, Hafsa Bhardwaj MD, Danica Goldsmith MD, Samy Terry MD  and providing coverage with Meet Perea MD  Providing Infectious Disease Consultations at [facility], [facility], [place], [facility], [facility]'s    Office# [phone] to schedule follow up appointments  Answering Service for urgent calls or New Consults [phone]  Cell# to text for urgent issues Ovidio Manning [phone]     HPI:  72 yo male with PMHx T2DM, peripheral neuropathy, HTN, HLD, A-fib (on Eliquis), TBI (s/p SDH with EVAC on 9/2021), and traumatic subdural hemorrhage in 10/2022 Covid PNA, and major depression, with recent admission at hospitals ICU from 9/25-9/30, presents with worsening cough and decreased mental status. Day of admission the pt had worse coughing with phelgm, and the wife thought that he felt hot to the touch. She gave him Robitussin. This afternoon, he began to be less responsive than normal, so she brought him to the ED.     Labs significant for: WBC 20.79, Hgb 10.7, lactate 1.9, Procal 0.25, sepsis not likely. Pro BNP 1166, COVID neg. RVP neg.  Imaging: CT head: negative  CT chest, Abd/pelvis:  Right lower lobe pneumonia and small right pleural effusion. No acute intra-abdominal or pelvic findings on unenhanced CT.      PAST MEDICAL & SURGICAL HISTORY:  TBI (traumatic brain injury)      HTN (hypertension)      Atrial fibrillation      Peripheral neuropathy      Major depression      HLD (hyperlipidemia)      CAD (coronary artery disease)      BPH (benign prostatic hyperplasia)      Pneumonia due to COVID-19 virus  June 2022      Hemorrhage in the brain      H/O craniotomy          Antimicrobials  piperacillin/tazobactam IVPB.. 3.375 Gram(s) IV Intermittent every 8 hours      Immunological      Other  apixaban 2.5 milliGRAM(s) Oral every 12 hours  dextrose 5%. 1000 milliLiter(s) IV Continuous <Continuous>  dextrose 5%. 1000 milliLiter(s) IV Continuous <Continuous>  dextrose 50% Injectable 25 Gram(s) IV Push once  dextrose 50% Injectable 12.5 Gram(s) IV Push once  dextrose 50% Injectable 25 Gram(s) IV Push once  dextrose Oral Gel 15 Gram(s) Oral once PRN  diltiazem    milliGRAM(s) Oral daily  gabapentin 400 milliGRAM(s) Oral three times a day  glucagon  Injectable 1 milliGRAM(s) IntraMuscular once  hydrALAZINE 25 milliGRAM(s) Oral three times a day  insulin lispro (ADMELOG) corrective regimen sliding scale   SubCutaneous every 6 hours  latanoprost 0.005% Ophthalmic Solution 1 Drop(s) Both EYES at bedtime  mirtazapine 7.5 milliGRAM(s) Oral daily  modafinil 100 milliGRAM(s) Oral daily  pantoprazole    Tablet 40 milliGRAM(s) Oral before breakfast  sodium chloride 0.9%. 1000 milliLiter(s) IV Continuous <Continuous>      Allergies    No Known Allergies    Intolerances        SOCIAL HISTORY:  Tobacco: Denies  EtOH: Denies   Recreational drug use: Denies  Lives with: wife, with [facility]  Ambulates: bedbound  ADLs: requires assistance with eating and ADLs  Diet: soft and bite sized, and thin liquids (20 Nov 2023 03:04)      FAMILY HISTORY:  FH: HTN (hypertension) (Father, Mother, Sibling)  Family history of bone cancer (Sibling)  FH: Alzheimers disease (Sibling)        ROS:  limited    Vital Signs Last 24 Hrs  T(C): 36.8 (20 Nov 2023 12:32), Max: 36.8 (20 Nov 2023 12:32)  T(F): 98.2 (20 Nov 2023 12:32), Max: 98.2 (20 Nov 2023 12:32)  HR: 99 (20 Nov 2023 12:32) (80 - 99)  BP: 136/87 (20 Nov 2023 12:32) (136/87 - 156/84)  BP(mean): --  RR: 16 (20 Nov 2023 12:32) (16 - 18)  SpO2: 100% (20 Nov 2023 12:32) (95% - 100%)    Parameters below as of 20 Nov 2023 12:32  Patient On (Oxygen Delivery Method): room air        PE:  In no distress  HEENT:  NC, PERRL, sclerae anicteric, conjunctivae clear, EOMI.  Sinuses nontender, no nasal exudate.  No buccal or pharyngeal lesions, erythema or exudate  Neck:  Supple, no adenopathy  Lungs:  No accessory muscle use, decreased BS left base  Cor:  distant  Abd:  Symmetric, normoactive BS.  Soft, nontender, no masses, guarding or rebound.  Liver and spleen not enlarged  Extrem:  No cyanosis or edema  Skin:  No rashes.  Neuro: limited but verbal and responsive  Musc: contracted        LABS:                        11.2   12.04 )-----------( 126      ( 20 Nov 2023 05:20 )             33.8       WBC Count: 12.04 K/uL (11-20-23 @ 05:20)  WBC Count: 20.79 K/uL (11-19-23 @ 21:02)      11-20    146<H>  |  113<H>  |  15  ----------------------------<  113<H>  3.5   |  27  |  0.96    Ca    8.0<L>      20 Nov 2023 05:20  Mg     2.0     11-19    TPro  5.8<L>  /  Alb  2.9<L>  /  TBili  0.7  /  DBili  x   /  AST  9<L>  /  ALT  13  /  AlkPhos  83  11-20      Creatinine: 0.96 mg/dL (11-20-23 @ 05:20)  Creatinine: 1.00 mg/dL (11-19-23 @ 21:02)      Urinalysis Basic - ( 20 Nov 2023 05:20 )    Color: x / Appearance: x / SG: x / pH: x  Gluc: 113 mg/dL / Ketone: x  / Bili: x / Urobili: x   Blood: x / Protein: x / Nitrite: x   Leuk Esterase: x / RBC: x / WBC x   Sq Epi: x / Non Sq Epi: x / Bacteria: x              MICROBIOLOGY:      RADIOLOGY & ADDITIONAL STUDIES:    --< from: CT Chest No Cont (11.19.23 @ 23:12) >    ACC: [id number] EXAM:  CT ABDOMEN AND PELVIS   ORDERED BY: YULIET BARBOSA     ACC: [id number] EXAM:  CT CHEST   ORDERED BY: YULIET BARBOSA     PROCEDURE DATE:  11/19/2023          INTERPRETATION:  CLINICAL INFORMATION: Weakness, abnormal chest x-ray.    COMPARISON: 9/25/2023.    CONTRAST/COMPLICATIONS:  IV Contrast: NONE  Oral Contrast: NONE  Complications: None reported at time of study completion    PROCEDURE:  CT of the Chest, Abdomen and Pelvis was performed.  Sagittal and coronal reformats were performed.    FINDINGS:  CHEST:  LUNGS AND LARGE AIRWAYS: Patent central airways. Patchy right lower lobe   consolidation. 2 mm nodule in the periphery of the left lower lobe and   small calcified granuloma in the left lower lobe.  PLEURA:Small right pleural effusion.  VESSELS: Main pulmonary artery is not dilated. Thoracic aorta is normal   in caliber. Atherosclerotic calcifications of the thoracic aorta, great   vessels, and coronary arteries.  HEART: Heart is enlarged. Trace pericardial effusion.  MEDIASTINUM AND ALYSSA: No lymphadenopathy.  CHEST WALL AND LOWER NECK: Symmetric bilateral gynecomastia.    ABDOMEN AND PELVIS:  LIVER: Within normal limits.  BILE DUCTS: Normal caliber.  GALLBLADDER: Within normal limits.  SPLEEN: Within normal limits.  PANCREAS: Within normal limits.  ADRENALS: Within normal limits.  KIDNEYS/URETERS: No right or left hydroureteronephrosis or   nephrolithiasis. Small left renal cyst.    BLADDER: Within normal limits.  REPRODUCTIVE ORGANS: [place] is mildly enlarged.    BOWEL: No bowel obstruction. Appendix is normal. Moderate stool burden in   the colon. Mild rectal prolapse.  PERITONEUM: No ascites or pneumoperitoneum.  VESSELS: Atherosclerotic calcifications of the aortoiliac tree and  abdominal aortic branches. Normal caliber abdominal aorta.  RETROPERITONEUM/LYMPH NODES: No lymphadenopathy.  ABDOMINAL WALL: Small fat-containing left inguinal hernia. Mild   generalized soft tissue edema.  BONES: Degenerative changes of the spine.    IMPRESSION:  Right lower lobe pneumonia and small right pleural effusion.    No acute intra-abdominal or pelvic findings on unenhanced CT.              HEIDI ROSADO DO; Attending Radiologist  This document has been electronically signed. Nov 19 2023 11:26PM    < end of copied text >

## 2023-11-20 NOTE — SWALLOW BEDSIDE ASSESSMENT ADULT - SWALLOW EVAL: DIAGNOSIS
1. Mild oral dysphagia for puree, soft and bite sized solids and mildly-thick liquids marked by adequate acceptance and containment, adequate mastication of solids, prolonged oral transit and adequate oral clearance. 2. Moderate oral dysphagia for thin liquids marked by adequate acceptance and contianment, delayed oral transit with instances of bolus holding requiring verbal cues to swallow and adequate oral clearance. 3. Mild pharyngeal dysphagia for aforementioned consistencies marked by a suspected delay in initiation of pharyngeal swallow, hyolaryngeal excursion present upon palpation and no overt s/s of penetration/aspiration evidenced across trials.

## 2023-11-20 NOTE — SWALLOW BEDSIDE ASSESSMENT ADULT - ORAL PHASE
instances of bolus holding requiring verbal cues to swallow/Delayed oral transit time Delayed oral transit time

## 2023-11-20 NOTE — H&P ADULT - PROBLEM SELECTOR PLAN 4
Normocytic Anemia  Baseline: 14 in 9/2023  Hb 10.7, MCV 82  FU Iron studies  Target Hb>7.0  Trend hb

## 2023-11-20 NOTE — H&P ADULT - PROBLEM SELECTOR PLAN 3
- pt with hx dysphagia, was on soft and bite sized diet at home  - wife reports pt has been coughing after drinking water  - CT chest with RLL PNA, likely 2/2 aspiration event  - pt will be NPO pending bedside dysphagia screen and formal S&S eval  - aspiration precautions  - head of bed elevated

## 2023-11-20 NOTE — PATIENT PROFILE ADULT - FALL HARM RISK - HARM RISK INTERVENTIONS
Assistance with ambulation/Assistance OOB with selected safe patient handling equipment/Communicate Risk of Fall with Harm to all staff/Discuss with provider need for PT consult/Monitor gait and stability/Provide patient with walking aids - walker, cane, crutches/Reinforce activity limits and safety measures with patient and family/Review medications for side effects contributing to fall risk/Sit up slowly, dangle for a short time, stand at bedside before walking/Tailored Fall Risk Interventions/Toileting schedule using arm’s reach rule for commode and bathroom/Visual Cue: Yellow wristband and red socks/Bed in lowest position, wheels locked, appropriate side rails in place/Call bell, personal items and telephone in reach/Instruct patient to call for assistance before getting out of bed or chair/Non-slip footwear when patient is out of bed/[place] to call system/Physically safe environment - no spills, clutter or unnecessary equipment/Purposeful Proactive Rounding/Room/bathroom lighting operational, light cord in reach

## 2023-11-20 NOTE — H&P ADULT - PROBLEM SELECTOR PLAN 2
- WBC 20.79  - likely 2/2 PNA, pt does not meet sepsis criteria at this time  - FU BCx, U/A, UCx  - continue Zosyn  - Consult ID, Dr. Manning, FU recs  - plan as above

## 2023-11-20 NOTE — CONSULT NOTE ADULT - SUBJECTIVE AND OBJECTIVE BOX
PULMONARY CONSULT NOTE      MELBA PARMAR  MRN-[id number]    Patient is a 73y old  Male who presents with a chief complaint of aspiration PNA (20 Nov 2023 03:04)      HISTORY OF PRESENT ILLNESS:  · Patient's Sexual Orientation	Heterosexual  · Patient's Gender Identity	Male  · Patient's Preferred Pronoun	Him/He     History of Present Illness:  Reason for Admission: aspiration PNA  History of Present Illness:   72 yo male with PMHx T2DM, peripheral neuropathy, HTN, HLD, A-fib (on Eliquis), TBI (s/p SDH with EVAC on 9/2021), and traumatic subdural hemorrhage in 10/2022 Covid PNA, and major depression, with recent admission at [facility] ICU from 9/25-9/30, presents with worsening cough and decreased mental status.  He was previously seen at [facility] ICU for severe metformin-induced lactic acidosis and sepsis 2/2 retrosigmoid colitis and suspected aspiration pneumonia. Metformin was discontinued at this time.  Per wife, since discharge home the pt has been doing well. He has been his normal self, at baseline patient is alert and oriented to person occasionally will answer some questions. He has been eating well. Wife tries to give him soft and bite sized food, and he coughs when he drinks water. Today the pt had worse coughing with phelgm, and the wife thought that he felt hot to the touch. She gave him Robitussin. This afternoon, he began to be less responsive than normal, so she brought him to the ED. Denies N/V/D, constipation, hematuria, rash, CP, abd pain.  MEDICATIONS  (STANDING):  apixaban 2.5 milliGRAM(s) Oral every 12 hours  dextrose 5%. 1000 milliLiter(s) (100 mL/Hr) IV Continuous <Continuous>  dextrose 5%. 1000 milliLiter(s) (50 mL/Hr) IV Continuous <Continuous>  dextrose 50% Injectable 25 Gram(s) IV Push once  dextrose 50% Injectable 25 Gram(s) IV Push once  dextrose 50% Injectable 12.5 Gram(s) IV Push once  diltiazem    milliGRAM(s) Oral daily  gabapentin 400 milliGRAM(s) Oral three times a day  glucagon  Injectable 1 milliGRAM(s) IntraMuscular once  hydrALAZINE 25 milliGRAM(s) Oral three times a day  insulin lispro (ADMELOG) corrective regimen sliding scale   SubCutaneous every 6 hours  latanoprost 0.005% Ophthalmic Solution 1 Drop(s) Both EYES at bedtime  mirtazapine 7.5 milliGRAM(s) Oral daily  modafinil 100 milliGRAM(s) Oral daily  pantoprazole    Tablet 40 milliGRAM(s) Oral before breakfast  sodium chloride 0.9%. 1000 milliLiter(s) (75 mL/Hr) IV Continuous <Continuous>    FAMILY HISTORY:  Father  Still living? Unknown  FH: HTN (hypertension), Age at diagnosis: Age Unknown    Mother  Still living? Unknown  FH: HTN (hypertension), Age at diagnosis: Age Unknown    Sibling  Still living? No  Family history of bone cancer, Age at diagnosis: Age Unknown  FH: Alzheimers disease, Age at diagnosis: Age Unknown  FH: HTN (hypertension), Age at diagnosis: Age Unknown.     Social History:  · Substance use	No  · Social History (marital status, living situation, occupation, and sexual history)	Tobacco: Denies  EtOH: Denies   Recreational drug use: Denies  Lives with: wife, with [facility]  Ambulates: bedbound  ADLs: requires assistance with eating and ADLs  Diet: soft and bite sized, and thin liquids     Tobacco Screening:  · Core Measure Site	Yes  · Has the patient used tobacco in the past 30 days?	No    Risk Assessment:    Present on Admission:  Deep Venous Thrombosis	no  Pulmonary Embolus	no     HIV Screening:  · In accordance with [place] State law, we offer every patient who comes to our ED an HIV test. Would you like to be tested today?	Opt out      MEDICATIONS  (PRN):  dextrose Oral Gel 15 Gram(s) Oral once PRN Blood Glucose LESS THAN 70 milliGRAM(s)/deciliter      Allergies    No Known Allergies    Intolerances        PAST MEDICAL & SURGICAL HISTORY:  TBI (traumatic brain injury)      HTN (hypertension)      Atrial fibrillation      Peripheral neuropathy      Major depression      HLD (hyperlipidemia)      CAD (coronary artery disease)      BPH (benign prostatic hyperplasia)      Pneumonia due to COVID-19 virus  June 2022      Hemorrhage in the brain      H/O craniotomy          FAMILY HISTORY:  FH: HTN (hypertension) (Father, Mother, Sibling)    Family history of bone cancer (Sibling)    FH: Alzheimers disease (Sibling)        SOCIAL HISTORY  Smoking History:     REVIEW OF SYSTEMS:    REVIEW OF SYSTEMS      General:	    Skin/Breast:  	  Ophthalmologic:  	  ENMT:	    Respiratory and Thorax:  	  Cardiovascular:	    Gastrointestinal:	    Genitourinary:	    Musculoskeletal:	    Neurological:	    Psychiatric:	    Hematology/Lymphatics:	    Endocrine:	    Allergic/Immunologic:	    Vital Signs Last 24 Hrs  T(C): 36.7 (19 Nov 2023 19:59), Max: 36.7 (19 Nov 2023 19:59)  T(F): 98 (19 Nov 2023 19:59), Max: 98 (19 Nov 2023 19:59)  HR: 80 (19 Nov 2023 19:59) (80 - 80)  BP: 156/84 (19 Nov 2023 19:59) (156/84 - 156/84)  BP(mean): --  RR: 18 (19 Nov 2023 19:59) (18 - 18)  SpO2: 95% (19 Nov 2023 19:59) (95% - 95%)    Parameters below as of 19 Nov 2023 19:59  Patient On (Oxygen Delivery Method): room air        PHYSICAL EXAMINATION:  PHYSICAL EXAM:      Constitutional:    Eyes:    ENMT:    Neck:    Breasts:    Back:    Respiratory:    Cardiovascular:    Gastrointestinal:    Genitourinary:    Rectal:    Extremities:    Vascular:    Neurological:    Skin:    Lymph Nodes:    Musculoskeletal:    Psychiatric:          LABS:                        10.7   20.79 )-----------( 243      ( 19 Nov 2023 21:02 )             32.9     11-19    143  |  106  |  17  ----------------------------<  106<H>  3.9   |  30  |  1.00    Ca    8.5      19 Nov 2023 21:02  Mg     2.0     11-19    TPro  6.3  /  Alb  3.1<L>  /  TBili  0.6  /  DBili  x   /  AST  10<L>  /  ALT  13  /  AlkPhos  89  11-19    PT/INR - ( 19 Nov 2023 21:02 )   PT: 13.2 sec;   INR: 1.13 ratio         PTT - ( 19 Nov 2023 21:02 )  PTT:27.9 sec  Urinalysis Basic - ( 19 Nov 2023 21:02 )    Color: x / Appearance: x / SG: x / pH: x  Gluc: 106 mg/dL / Ketone: x  / Bili: x / Urobili: x   Blood: x / Protein: x / Nitrite: x   Leuk Esterase: x / RBC: x / WBC x   Sq Epi: x / Non Sq Epi: x / Bacteria: x                Lactate, Blood: 1.9 mmol/L (11-19-23 @ 21:02)    Procalcitonin, Serum: 0.25 ng/mL (11-19-23 @ 21:02)      MICROBIOLOGY:    RADIOLOGY & ADDITIONAL STUDIES:    ACC: [id number] EXAM:  CT ABDOMEN AND PELVIS   ORDERED BY: YULIET BARBOSA     ACC: [id number] EXAM:  CT CHEST   ORDERED BY: YULIET BARBOSA     PROCEDURE DATE:  11/19/2023          INTERPRETATION:  CLINICAL INFORMATION: Weakness, abnormal chest x-ray.    COMPARISON: 9/25/2023.    CONTRAST/COMPLICATIONS:  IV Contrast: NONE  Oral Contrast: NONE  Complications: None reported at time of study completion    PROCEDURE:  CT of the Chest, Abdomen and Pelvis was performed.  Sagittal and coronal reformats were performed.    FINDINGS:  CHEST:  LUNGS AND LARGE AIRWAYS: Patent central airways. Patchy right lower lobe   consolidation. 2 mm nodule in the periphery of the left lower lobe and   small calcified granuloma in the left lower lobe.  PLEURA: Small right pleural effusion.  VESSELS: Main pulmonary artery is not dilated. Thoracic aorta is normal   in caliber. Atherosclerotic calcifications of the thoracic aorta, great   vessels, and coronary arteries.  HEART: Heart is enlarged. Trace pericardial effusion.  MEDIASTINUM AND ALYSSA: No lymphadenopathy.  CHEST WALL AND LOWER NECK: Symmetric bilateral gynecomastia.    ABDOMEN AND PELVIS:  LIVER: Within normal limits.  BILE DUCTS: Normal caliber.  GALLBLADDER: Within normal limits.  SPLEEN: Within normal limits.  PANCREAS: Within normal limits.  ADRENALS: Within normal limits.  KIDNEYS/URETERS: No right or left hydroureteronephrosis or   nephrolithiasis. Small left renal cyst.    BLADDER: Within normal limits.  REPRODUCTIVE ORGANS: Prostate gland is mildly enlarged.    BOWEL: No bowel obstruction. Appendix is normal. Moderate stool burden in   the colon. Mild rectal prolapse.  PERITONEUM: No ascites or pneumoperitoneum.  VESSELS: Atherosclerotic calcifications of the aortoiliac tree and   abdominal aortic branches. Normal caliber abdominal aorta.  RETROPERITONEUM/LYMPH NODES: No lymphadenopathy.  ABDOMINAL WALL: Small fat-containing left inguinal hernia. Mild   generalized soft tissue edema.  BONES: Degenerative changes of the spine.    IMPRESSION:  Right lower lobe pneumonia and small right pleural effusion.    No acute intra-abdominal or pelvic findings on unenhanced CT.        --- End of Report ---            HEIDI ROSADO DO; Attending Radiologist  This document has been electronically signed. Nov 19 2023 11:26PM PULMONARY CONSULT NOTE      MELBA PARMAR  MRN-[id number]    Patient is a 73y old  Male who presents with a chief complaint of aspiration PNA (20 Nov 2023 03:04)      HISTORY OF PRESENT ILLNESS:  · Patient's Sexual Orientation	Heterosexual  · Patient's Gender Identity	Male  · Patient's Preferred Pronoun	Him/He     History of Present Illness:  Reason for Admission: aspiration PNA  History of Present Illness:   72 yo male with PMHx T2DM, peripheral neuropathy, HTN, HLD, A-fib (on Eliquis), TBI (s/p SDH with EVAC on 9/2021), and traumatic subdural hemorrhage in 10/2022 Covid PNA, and major depression, with recent admission at [facility] ICU from 9/25-9/30, presents with worsening cough and decreased mental status.  He was previously seen at [facility] ICU for severe metformin-induced lactic acidosis and sepsis 2/2 retrosigmoid colitis and suspected aspiration pneumonia. Metformin was discontinued at this time.  Per wife, since discharge home the pt has been doing well. He has been his normal self, at baseline patient is alert and oriented to person occasionally will answer some questions. He has been eating well. Wife tries to give him soft and bite sized food, and he coughs when he drinks water. Today the pt had worse coughing with phelgm, and the wife thought that he felt hot to the touch. She gave him Robitussin. This afternoon, he began to be less responsive than normal, so she brought him to the ED. Denies N/V/D, constipation, hematuria, rash, CP, abd pain.  MEDICATIONS  (STANDING):  apixaban 2.5 milliGRAM(s) Oral every 12 hours  dextrose 5%. 1000 milliLiter(s) (100 mL/Hr) IV Continuous <Continuous>  dextrose 5%. 1000 milliLiter(s) (50 mL/Hr) IV Continuous <Continuous>  dextrose 50% Injectable 25 Gram(s) IV Push once  dextrose 50% Injectable 25 Gram(s) IV Push once  dextrose 50% Injectable 12.5 Gram(s) IV Push once  diltiazem    milliGRAM(s) Oral daily  gabapentin 400 milliGRAM(s) Oral three times a day  glucagon  Injectable 1 milliGRAM(s) IntraMuscular once  hydrALAZINE 25 milliGRAM(s) Oral three times a day  insulin lispro (ADMELOG) corrective regimen sliding scale   SubCutaneous every 6 hours  latanoprost 0.005% Ophthalmic Solution 1 Drop(s) Both EYES at bedtime  mirtazapine 7.5 milliGRAM(s) Oral daily  modafinil 100 milliGRAM(s) Oral daily  pantoprazole    Tablet 40 milliGRAM(s) Oral before breakfast  sodium chloride 0.9%. 1000 milliLiter(s) (75 mL/Hr) IV Continuous <Continuous>    FAMILY HISTORY:  Father  Still living? Unknown  FH: HTN (hypertension), Age at diagnosis: Age Unknown    Mother  Still living? Unknown  FH: HTN (hypertension), Age at diagnosis: Age Unknown    Sibling  Still living? No  Family history of bone cancer, Age at diagnosis: Age Unknown  FH: Alzheimers disease, Age at diagnosis: Age Unknown  FH: HTN (hypertension), Age at diagnosis: Age Unknown.     Social History:  · Substance use	No  · Social History (marital status, living situation, occupation, and sexual history)	Tobacco: Denies  EtOH: Denies   Recreational drug use: Denies  Lives with: wife, with [facility]  Ambulates: bedbound  ADLs: requires assistance with eating and ADLs  Diet: soft and bite sized, and thin liquids     Tobacco Screening:  · Core Measure Site	Yes  · Has the patient used tobacco in the past 30 days?	No    Risk Assessment:    Present on Admission:  Deep Venous Thrombosis	no  Pulmonary Embolus	no     HIV Screening:  · In accordance with [place] State law, we offer every patient who comes to our ED an HIV test. Would you like to be tested today?	Opt out      MEDICATIONS  (PRN):  dextrose Oral Gel 15 Gram(s) Oral once PRN Blood Glucose LESS THAN 70 milliGRAM(s)/deciliter      Allergies    No Known Allergies    Intolerances        PAST MEDICAL & SURGICAL HISTORY:  TBI (traumatic brain injury)      HTN (hypertension)      Atrial fibrillation      Peripheral neuropathy      Major depression      HLD (hyperlipidemia)      CAD (coronary artery disease)      BPH (benign prostatic hyperplasia)      Pneumonia due to COVID-19 virus  June 2022      Hemorrhage in the brain      H/O craniotomy          FAMILY HISTORY:  FH: HTN (hypertension) (Father, Mother, Sibling)    Family history of bone cancer (Sibling)    FH: Alzheimers disease (Sibling)        SOCIAL HISTORY  Smoking History:     REVIEW OF SYSTEMS:    REVIEW OF SYSTEMS      General:	    Skin/Breast:  	  Ophthalmologic:  	  ENMT:	    Respiratory and Thorax:  	  Cardiovascular:	    Gastrointestinal:	    Genitourinary:	    Musculoskeletal:	    Neurological:	    Psychiatric:	    Hematology/Lymphatics:	    Endocrine:	    Allergic/Immunologic:	    Vital Signs Last 24 Hrs  T(C): 36.7 (19 Nov 2023 19:59), Max: 36.7 (19 Nov 2023 19:59)  T(F): 98 (19 Nov 2023 19:59), Max: 98 (19 Nov 2023 19:59)  HR: 80 (19 Nov 2023 19:59) (80 - 80)  BP: 156/84 (19 Nov 2023 19:59) (156/84 - 156/84)  BP(mean): --  RR: 18 (19 Nov 2023 19:59) (18 - 18)  SpO2: 95% (19 Nov 2023 19:59) (95% - 95%)    Parameters below as of 19 Nov 2023 19:59  Patient On (Oxygen Delivery Method): room air    heart s1s2  lung dc BS  head nc  head at      PHYSICAL EXAMINATION:  PHYSICAL EXAM:      Constitutional:    Eyes:    ENMT:    Neck:    Breasts:    Back:    Respiratory:    Cardiovascular:    Gastrointestinal:    Genitourinary:    Rectal:    Extremities:    Vascular:    Neurological:    Skin:    Lymph Nodes:    Musculoskeletal:    Psychiatric:          LABS:                        10.7   20.79 )-----------( 243      ( 19 Nov 2023 21:02 )             32.9     11-19    143  |  106  |  17  ----------------------------<  106<H>  3.9   |  30  |  1.00    Ca    8.5      19 Nov 2023 21:02  Mg     2.0     11-19    TPro  6.3  /  Alb  3.1<L>  /  TBili  0.6  /  DBili  x   /  AST  10<L>  /  ALT  13  /  AlkPhos  89  11-19    PT/INR - ( 19 Nov 2023 21:02 )   PT: 13.2 sec;   INR: 1.13 ratio         PTT - ( 19 Nov 2023 21:02 )  PTT:27.9 sec  Urinalysis Basic - ( 19 Nov 2023 21:02 )    Color: x / Appearance: x / SG: x / pH: x  Gluc: 106 mg/dL / Ketone: x  / Bili: x / Urobili: x   Blood: x / Protein: x / Nitrite: x   Leuk Esterase: x / RBC: x / WBC x   Sq Epi: x / Non Sq Epi: x / Bacteria: x                Lactate, Blood: 1.9 mmol/L (11-19-23 @ 21:02)    Procalcitonin, Serum: 0.25 ng/mL (11-19-23 @ 21:02)      MICROBIOLOGY:    RADIOLOGY & ADDITIONAL STUDIES:    ACC: [id number] EXAM:  CT ABDOMEN AND PELVIS   ORDERED BY: YULIET BARBOSA     ACC: [id number] EXAM:  CT CHEST   ORDERED BY: YULIET BARBOSA     PROCEDURE DATE:  11/19/2023          INTERPRETATION:  CLINICAL INFORMATION: Weakness, abnormal chest x-ray.    COMPARISON: 9/25/2023.    CONTRAST/COMPLICATIONS:  IV Contrast: NONE  Oral Contrast: NONE  Complications: None reported at time of study completion    PROCEDURE:  CT of the Chest, Abdomen and Pelvis was performed.  Sagittal and coronal reformats were performed.    FINDINGS:  CHEST:  LUNGS AND LARGE AIRWAYS: Patent central airways. Patchy right lower lobe   consolidation. 2 mm nodule in the periphery of the left lower lobe and   small calcified granuloma in the left lower lobe.  PLEURA: Small right pleural effusion.  VESSELS: Main pulmonary artery is not dilated. Thoracic aorta is normal   in caliber. Atherosclerotic calcifications of the thoracic aorta, great   vessels, and coronary arteries.  HEART: Heart is enlarged. Trace pericardial effusion.  MEDIASTINUM AND ALYSSA: No lymphadenopathy.  CHEST WALL AND LOWER NECK: Symmetric bilateral gynecomastia.    ABDOMEN AND PELVIS:  LIVER: Within normal limits.  BILE DUCTS: Normal caliber.  GALLBLADDER: Within normal limits.  SPLEEN: Within normal limits.  PANCREAS: Within normal limits.  ADRENALS: Within normal limits.  KIDNEYS/URETERS: No right or left hydroureteronephrosis or   nephrolithiasis. Small left renal cyst.    BLADDER: Within normal limits.  REPRODUCTIVE ORGANS: Prostate gland is mildly enlarged.    BOWEL: No bowel obstruction. Appendix is normal. Moderate stool burden in   the colon. Mild rectal prolapse.  PERITONEUM: No ascites or pneumoperitoneum.  VESSELS: Atherosclerotic calcifications of the aortoiliac tree and   abdominal aortic branches. Normal caliber abdominal aorta.  RETROPERITONEUM/LYMPH NODES: No lymphadenopathy.  ABDOMINAL WALL: Small fat-containing left inguinal hernia. Mild   generalized soft tissue edema.  BONES: Degenerative changes of the spine.    IMPRESSION:  Right lower lobe pneumonia and small right pleural effusion.    No acute intra-abdominal or pelvic findings on unenhanced CT.        --- End of Report ---            HEIDI ROSADO DO; Attending Radiologist  This document has been electronically signed. Nov 19 2023 11:26PM

## 2023-11-20 NOTE — H&P ADULT - PROBLEM SELECTOR PLAN 5
- chronic problem  - continue home  cardizem  mg daily  - continue home eliquis 2.5 mg PO BID  - continue to monitor - chronic problem  - continue home  Cardizem  mg daily  - continue home Eliquis 2.5 mg PO BID  - continue to monitor

## 2023-11-20 NOTE — H&P ADULT - NSHPSOCIALHISTORY_GEN_ALL_CORE
Tobacco: Denies  EtOH: Denies   Recreational drug use: Denies  Lives with: wife, with [facility]  Ambulates: bedbound  ADLs: requires assistance with eating and ADLs  Diet: soft and bite sized, and thin liquids

## 2023-11-20 NOTE — CONSULT NOTE ADULT - ASSESSMENT
74 yo male with PMHx T2DM, peripheral neuropathy, HTN, HLD, A-fib (on Eliquis), TBI (s/p SDH with EVAC on 9/2021), and traumatic subdural hemorrhage in 10/2022 Covid PNA, and major depression, with recent admission at [facility] ICU from 9/25-9/30, presents with worsening cough and decreased mental status.  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 Metformin was discontinued at this time. 74 yo male with PMHx T2DM, peripheral neuropathy, HTN, HLD, A-fib (on Eliquis), TBI (s/p SDH with EVAC on 9/2021), and traumatic subdural hemorrhage in 10/2022 Covid PNA, and major depression, with recent admission at [facility] ICU from 9/25-9/30, presents with worsening cough and decreased mental status.  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 Metformin was discontinued at this time.    pna  ams  frailty  weakness  DM  neuropathy  HLD  HTN  TBI hx   SDH hx  Depression    SLP eval  emp ABX  HOB elev  asp prec  oral hygiene  assist with needs  monitor mentation  DM care  serial FS  old records reviewed  TTE reviewed - grossly int  prognosis guarded  GOC - pt is DNR

## 2023-11-20 NOTE — H&P ADULT - ATTENDING COMMENTS
74 yo male with PMHx T2DM, peripheral neuropathy, HTN, HLD, A-fib (on Eliquis), TBI (s/p SDH with EVAC on 9/2021), and traumatic subdural hemorrhage in 10/2022 Covid PNA, and major depression, with recent admission at [facility] ICU from 9/25-9/30, presents with worsening cough and decreased mental status. Found to have RLL PNA likely aspiration. Admit for further management.    Agree with above. Edited where appropriate

## 2023-11-20 NOTE — CARE COORDINATION ASSESSMENT. - NSCAREPROVIDERS_GEN_ALL_CORE_FT
CARE PROVIDERS:  Accepting Physician: Sabrina Fletcher  Administration: Annie Payne  Administration: Daniel Duncan  Administration: Rima Slade  Administration: Suni Sheehan  Administration: Edilberto Jaimes  Admitting: Sabrina Fletcher  Attending: Sabrina Fletcher  Consultant: Clif Bui  Covering Team: Jesus Bhardwaj  Covering Team: Sabrina Fletcher  Covering Team: Lety Amor  ED Attending: Jayashree Abbott  ED Nurse: Pippa Veloz  Nurse: Katerina Ramos  Nurse: Joi Piper  Nurse: Bryant Funez  Ordered: ADM, User  Ordered: Doctor, Unknown  Outpatient Provider: Mirta Goldsmith  Outpatient Provider: Nigel Joe  Override: Katerina Ramos  Override: Joi Piper  Primary Team: Leslie Bhardwaj  : Luanne Francisco  Speech Pathology: Lyric Birch  Speech Pathology: Lexi Ponce  Team: [facility], Team  UR// Supp. Assoc.: Mayra Mcfarland

## 2023-11-20 NOTE — H&P ADULT - PATIENT'S PREFERRED PRONOUN
[facility]  Surgery Department  Operative Note    SUMMARY     Date of Procedure: 10/5/2020     Procedure: Procedure(s) (LRB):  LARYNGOSCOPY, MICRO SUSPENSION WITH AUGMENTATION (N/A)  VATS, WITH DECORTICATION, LUNG (Right)     Surgeon(s) and Role:  Panel 1:     * Jeremy Shine MD - Primary     * Juanjose Casillas MD - Resident - Assisting  Panel 2:     * Yfn Mendoza MD - Primary     * Harpreet Guillen MD - Resident - Assisting    Pre-Operative Diagnosis: Respiratory distress [R06.03]  S/P MVR (mitral valve repair) [Z98.890]  Multiloculated right pleural effusion    Post-Operative Diagnosis: Post-Op Diagnosis Codes:     * Respiratory distress [R06.03]     * S/P MVR (mitral valve repair) [Z98.890]     *Trapped lung    Anesthesia: General  Indications: Fatou Lorenzo is a 77 y.o. female status post recent mitral and tricuspid valve replacement who developed a multiloculated right pleural effusion. We recommended thoracoscopic drainage and decortication. The patient was in agreement with the plan and did sign informed consent.     Procedure:   The patient was identified in the preoperative area and all questions were answered. Informed consent was verified. She was then transported to the operating room and anesthesia was induced without complication. Arterial line was placed and she was intubated using a double lumen tube. She was then positioned in the left lateral decubitus position. The right chest was then prepped and draped in sterile fashion. Prophylactic antibiotics were administered. A timeout was performed verifying correct patient and procedure.   A hollow bore needle was inserted into the seventh intercostal space at the posterior axillary line. Aspiration yield thin serosanguineous fluid. The needle was removed and a 1.5cm incision was made in the same area. Dissection was carried down to the chest wall with cautery and the pleural space entered bluntly. Thin serosanguineous  effusion was drained and sent for microbiology. A port and camera was then inserted. There appeared to be a multiloculated pleural effusion with thin adhesions and also a fibrous rind around the middle lobe. Thin adhesions to the chest wall were easily broken up with the sucker. Over 1L of pleural fluid was drained. Two additional ports were inserted under direct vision in the fifth intercostal space. A limited decortication of the middle lobe was performed using ringed forceps and the sucker. We did not attempt aggressive decortication due to the patient's severe malnutrition and anticoagulated state. Pleural rind was sent for culture. The pleural cavity was then irrigated with warm saline and hemostasis achieved. Two 28Fr chest tubes were inserted under direct vision and secured to the skin. Double lung ventilation was resumed and the right lung appeared to inflate nicely. The remaining incision was closed with absorbable suture and sterile dressing applied.   The patient was then turned supine and re-intubated with a single lumen tube. The case was then turned over to ENT for laryngoscopy.     Dr. Shine was scrubbed for the case.     Complications: No    Estimated Blood Loss (EBL): 150mL           Implants: * No implants in log *    Specimens:   Specimen (12h ago, onward)    1. Right pleural fluid  2. Right pleural rind                  Condition: Stable    Disposition: ICU - intubated and hemodynamically stable.      Attending attestation:  I was present for and either directly assisted with or performed the critical and key portions of the procedure.     Him/He

## 2023-11-20 NOTE — H&P ADULT - ASSESSMENT
74 yo male with PMHx T2DM, peripheral neuropathy, HTN, HLD, A-fib (on Eliquis), TBI (s/p SDH with EVAC on 9/2021), and traumatic subdural hemorrhage in 10/2022 Covid PNA, and major depression, with recent admission at [facility] ICU from 9/25-9/30, presents with worsening cough and decreased mental status. Found to have RLL PNA likely aspiration. Admit for further management.

## 2023-11-20 NOTE — H&P ADULT - CONVERSATION DETAILS
Discussed components of MOLST with wife Mony (HCP). Wife states that the pt has MOLST at home which requests DNR and trial of intubation. Wife will bring in MOLST in AM. Pt will be DNR with trial of intubation.

## 2023-11-20 NOTE — H&P ADULT - PROBLEM SELECTOR PLAN 9
- TBI (s/p SDH with EVAC on 9/2021), and traumatic subdural hemorrhage in 10/2022   - at baseline: AAOx1 talks occasionally per wife  - CT head negative for acute pathology - TBI (s/p SDH with EVAC on 9/2021), and traumatic subdural hemorrhage in 10/2022   - at baseline: AAOx1 talks occasionally per wife  - CT head negative for acute pathology  - continue home modafinil    # Glaucoma  - continue latanoprost eye drops    # GERD  - continue home pantoprazole

## 2023-11-20 NOTE — PATIENT PROFILE ADULT - NSPROPOAURINARYCATHETER_GEN_A_NUR
[FreeTextEntry1] : 84 year old with PAD s/p LLE BK POP to DP bypass with rGSV on 5/2020, followed by L thigh abscess drainage and excisional debridement of necrotic tissue with wound VAC on 7/10, wound vac removed 9/4/2020 presents for f/u. On exam, LLE: well healed L thigh incision to the calf with no skin pigmentation changes. No skin breakdown.  Toes are warm to touch with adequate capillary refill. \par \par Plan: \par I reviewed LLE duplex completed in the office today showing patent femoral to DP bypass.  \par Regarding AC therapy to continue taking Plavix daily. \par Pt to stay active walking daily. \par No vascular surgery interventions recommended at this time. \par She will f/u here in 6 months for LLE BK popliteal to DP bypass surveillance. Should she develop any unusual LE pain, swelling, open ulcers/sores to contact the office right away.  no

## 2023-11-20 NOTE — H&P ADULT - NSHPPHYSICALEXAM_GEN_ALL_CORE
Vital Signs Last 24 Hrs  T(C): 36.7 (19 Nov 2023 19:59), Max: 36.7 (19 Nov 2023 19:59)  T(F): 98 (19 Nov 2023 19:59), Max: 98 (19 Nov 2023 19:59)  HR: 80 (19 Nov 2023 19:59) (80 - 80)  BP: 156/84 (19 Nov 2023 19:59) (156/84 - 156/84)  RR: 18 (19 Nov 2023 19:59) (18 - 18)  SpO2: 95% (19 Nov 2023 19:59) (95% - 95%)    O2 Parameters below as of 19 Nov 2023 19:59  Patient On (Oxygen Delivery Method): room air    CONSTITUTIONAL: awake, alert, no acute distress, contracted limbs  HEENT: Atraumatic normocephalic. dry mucous membranes.  No conjunctival injection.  RESP: No respiratory distress; CTA b/l, no WRR  CV:  irregularly irregular rhythm. no MRG  GI:  Soft, nontender, nondistended, no rebound or guarding  MSK: upper arms contracted. 1+ nonpitting peripheral edema in hands and feet. No calf tenderness.  SKIN: Warm and dry. No rashes noted.  NEURO: not speaking, will nod/shake head in answer to questions.  PSYCH: unable to assess 2/2 mental status Vital Signs Last 24 Hrs  T(C): 36.7 (19 Nov 2023 19:59), Max: 36.7 (19 Nov 2023 19:59)  T(F): 98 (19 Nov 2023 19:59), Max: 98 (19 Nov 2023 19:59)  HR: 80 (19 Nov 2023 19:59) (80 - 80)  BP: 156/84 (19 Nov 2023 19:59) (156/84 - 156/84)  RR: 18 (19 Nov 2023 19:59) (18 - 18)  SpO2: 95% (19 Nov 2023 19:59) (95% - 95%)    O2 Parameters below as of 19 Nov 2023 19:59  Patient On (Oxygen Delivery Method): room air  CONSTITUTIONAL: awake, alert, no acute distress, contracted limbs  HEENT: Atraumatic normocephalic. dry mucous membranes.  No conjunctival injection.  RESP: No respiratory distress; CTA b/l, no WRR  CV:  irregularly irregular rhythm. no MRG  GI:  Soft, nontender, nondistended, no rebound or guarding  MSK: upper arms contracted. 1+ nonpitting peripheral edema in hands and feet. No calf tenderness.  SKIN: Warm and dry. No rashes noted.  NEURO: not speaking, will nod/shake head in answer to questions.  PSYCH: unable to assess 2/2 mental status

## 2023-11-20 NOTE — ED ADULT NURSE REASSESSMENT NOTE - NS ED NURSE REASSESS COMMENT FT1
11/20/23  0745: received pt from outgoing RN resting in stretcher.  Respirations even and unlabored.  Pt alert, verbal at this time disoriented to place time/situation.   No dyspnea noted o2 96% on RA.   Skin warm and dry pt appears in no acute distress at this time.  Safety maintained, IV abx infusing.   Will continue frequent monioring. BN

## 2023-11-20 NOTE — H&P ADULT - PROBLEM SELECTOR PLAN 6
- DASH diet when able to tolerate diet - chronic problem  - /84  - continue home hydralazine 25 mg TID and cardizem  mg daily  - DASH diet when able to tolerate diet

## 2023-11-20 NOTE — H&P ADULT - HISTORY OF PRESENT ILLNESS
72 yo male with PMHx T2DM, peripheral neuropathy, HTN, HLD, A-fib (on Eliquis), TBI (s/p SDH with EVAC on 9/2021), and traumatic subdural hemorrhage in 10/2022 Covid Pna, and major depression, with recent admission at [facility] ICU from 9/25-9/30, presents with worsening cough and decreased mental status.  He was previously seen at [facility] ICU for severe metformin-induced lactic acidosis and sepsis 2/2 retrosigmoid colitis and suspected aspiration pneumonia. Metformin was discontinued at this time.  Per wife, since discharge home the pt has been doing well. He has been his normal self, at baseline patient is alert and oriented to person occasionally will answer some questions. He has been eating well. Wife tries to give him soft and bite sized food, and he coughs when he drinks water. Today the pt had worse coughing with phelgm, and the wife thought that he felt hot to the touch. She gave him Robitussin. This afternoon, he began to be less responsive than normal, so she brought him to the ED. Denies N/V/D, constipation, hematuria, rash, CP, abd pain.    ED COURSE:  Vitals: T  98 F , HR 80 , BP  156/84 , RR 18 , SpO2  95% on RA  Labs significant for: WBC 20.79, Hgb 10.7, lactate 1.9, Procal 0.25, sepsis not likely. Pro BNP 1166, COVID neg. RVP neg.  Imaging: CT head: negative  CT chest, Abd/pelvis:  Right lower lobe pneumonia and small right pleural effusion. No acute intra-abdominal or pelvic findings on unenhanced CT.  EKG: AFib, HR 88, Right superior axis deviation, incomplete RBBB  Pt received: Zosyn, solumedrol 125 mg, duoneb, 2 L NS bolus 72 yo male with PMHx T2DM, peripheral neuropathy, HTN, HLD, A-fib (on Eliquis), TBI (s/p SDH with EVAC on 9/2021), and traumatic subdural hemorrhage in 10/2022 Covid PNA, and major depression, with recent admission at [facility] ICU from 9/25-9/30, presents with worsening cough and decreased mental status.  He was previously seen at [facility] ICU for severe metformin-induced lactic acidosis and sepsis 2/2 retrosigmoid colitis and suspected aspiration pneumonia. Metformin was discontinued at this time.  Per wife, since discharge home the pt has been doing well. He has been his normal self, at baseline patient is alert and oriented to person occasionally will answer some questions. He has been eating well. Wife tries to give him soft and bite sized food, and he coughs when he drinks water. Today the pt had worse coughing with phelgm, and the wife thought that he felt hot to the touch. She gave him Robitussin. This afternoon, he began to be less responsive than normal, so she brought him to the ED. Denies N/V/D, constipation, hematuria, rash, CP, abd pain.    ED COURSE:  Vitals: T  98 F , HR 80 , BP  156/84 , RR 18 , SpO2  95% on RA  Labs significant for: WBC 20.79, Hgb 10.7, lactate 1.9, Procal 0.25, sepsis not likely. Pro BNP 1166, COVID neg. RVP neg.  Imaging: CT head: negative  CT chest, Abd/pelvis:  Right lower lobe pneumonia and small right pleural effusion. No acute intra-abdominal or pelvic findings on unenhanced CT.  EKG: AFib, HR 88, Right superior axis deviation, incomplete RBBB  Pt received: Zosyn, solumedrol 125 mg, duoneb, 2 L NS bolus 74 yo male with PMHx T2DM, peripheral neuropathy, HTN, HLD, A-fib (on Eliquis), TBI (s/p SDH with EVAC on 9/2021), and traumatic subdural hemorrhage in 10/2022 Covid PNA, and major depression, with recent admission at [facility] ICU from 9/25-9/30, presents with worsening cough and decreased mental status.  He was previously seen at [facility] ICU for severe metformin-induced lactic acidosis and sepsis 2/2 retrosigmoid colitis and suspected aspiration pneumonia. Metformin was discontinued at this time.  Per wife, since discharge home the pt has been doing well. He has been his normal self, at baseline patient is alert and oriented to person occasionally will answer some questions. He has been eating well. Wife tries to give him soft and bite sized food, and he coughs when he drinks water. Today the pt had worse coughing with phelgm, and the wife thought that he felt hot to the touch. She gave him Robitussin. This afternoon, he began to be less responsive than normal, so she brought him to the ED. Denies N/V/D, constipation, hematuria, rash, CP, abd pain.  ED COURSE:  Vitals: T  98 F , HR 80 , BP  156/84 , RR 18 , SpO2  95% on RA  Labs significant for: WBC 20.79, Hgb 10.7, lactate 1.9, Procal 0.25, sepsis not likely. Pro BNP 1166, COVID neg. RVP neg.  Imaging: CT head: negative  CT chest, Abd/pelvis:  Right lower lobe pneumonia and small right pleural effusion. No acute intra-abdominal or pelvic findings on unenhanced CT.  EKG: AFib, HR 88, Right superior axis deviation, incomplete RBBB  Pt received: Zosyn, solumedrol 125 mg, duoneb, 2 L NS bolus

## 2023-11-20 NOTE — PROGRESS NOTE ADULT - PROBLEM SELECTOR PLAN 10
- on eliquis for AFib    # GOC  - Pt is DNR with trial of intubation, confirmed by wife  - wife will bring in MOLST from home

## 2023-11-20 NOTE — H&P ADULT - PROBLEM SELECTOR PLAN 7
- chronic problem, not on home insulin  - previously on metformin, then had metformin induced lactic acidosis requiring ICU stay in September. Has been off of metformin since then  - not currently on any medications  - AM A1C  - LDISS  - Finger sticks, Consistent carb diet when able to tolerate diet  - Hypoglycemic protocol - chronic problem, not on home insulin  - previously on metformin, then had metformin induced lactic acidosis requiring ICU stay in September. Has been off of metformin since then  - not currently on any medications  - AM A1C  - LDISS  - Finger sticks, Consistent carb diet when able to tolerate diet  - continue home gabapentin 400 mg TID for neuropathic pain  - Hypoglycemic protocol

## 2023-11-20 NOTE — CARE COORDINATION ASSESSMENT. - NSDCPLANSERVICES_GEN_ALL_CORE
Plan is for pt to return home once medically stable. CM to resume aide when appropriate.  Spouse states they have everything they need at home./Anticipated Needs Unclear at Present

## 2023-11-20 NOTE — PROGRESS NOTE ADULT - SUBJECTIVE AND OBJECTIVE BOX
Patient is a 73y old  Male who presents with a chief complaint of aspiration PNA (20 Nov 2023 16:42)      Subjective:  INTERVAL HPI/OVERNIGHT EVENTS: Patient seen and examined at bedside. No overnight events occurred. Pt not able to provide ROS due to baseline mental status. Smiles during exam. tracks with eyes. not answering questions.     MEDICATIONS  (STANDING):  apixaban 2.5 milliGRAM(s) Oral every 12 hours  dextrose 5%. 1000 milliLiter(s) (100 mL/Hr) IV Continuous <Continuous>  dextrose 5%. 1000 milliLiter(s) (50 mL/Hr) IV Continuous <Continuous>  dextrose 50% Injectable 25 Gram(s) IV Push once  dextrose 50% Injectable 12.5 Gram(s) IV Push once  dextrose 50% Injectable 25 Gram(s) IV Push once  diltiazem    milliGRAM(s) Oral daily  gabapentin 400 milliGRAM(s) Oral three times a day  glucagon  Injectable 1 milliGRAM(s) IntraMuscular once  hydrALAZINE 25 milliGRAM(s) Oral three times a day  insulin lispro (ADMELOG) corrective regimen sliding scale   SubCutaneous every 6 hours  latanoprost 0.005% Ophthalmic Solution 1 Drop(s) Both EYES at bedtime  mirtazapine 7.5 milliGRAM(s) Oral daily  modafinil 100 milliGRAM(s) Oral daily  pantoprazole    Tablet 40 milliGRAM(s) Oral before breakfast  piperacillin/tazobactam IVPB.. 3.375 Gram(s) IV Intermittent every 8 hours  senna 2 Tablet(s) Oral at bedtime  sodium chloride 0.9%. 1000 milliLiter(s) (75 mL/Hr) IV Continuous <Continuous>    MEDICATIONS  (PRN):  dextrose Oral Gel 15 Gram(s) Oral once PRN Blood Glucose LESS THAN 70 milliGRAM(s)/deciliter      Allergies    No Known Allergies    Intolerances        REVIEW OF SYSTEMS:  unable to obtain    Objective:  Vital Signs Last 24 Hrs  T(C): 36.8 (20 Nov 2023 12:32), Max: 36.8 (20 Nov 2023 12:32)  T(F): 98.2 (20 Nov 2023 12:32), Max: 98.2 (20 Nov 2023 12:32)  HR: 99 (20 Nov 2023 12:32) (80 - 99)  BP: 136/87 (20 Nov 2023 12:32) (136/87 - 156/84)  BP(mean): --  RR: 16 (20 Nov 2023 12:32) (16 - 18)  SpO2: 100% (20 Nov 2023 12:32) (95% - 100%)    Parameters below as of 20 Nov 2023 12:32  Patient On (Oxygen Delivery Method): room air        GENERAL: NAD, lying in bed comfortably, opens eyes to stimuli  HEAD:  Atraumatic, Normocephalic  EYES: EOMI, conjunctiva and sclera clear  ENT: Moist mucous membranes  NECK: Supple, No JVD  CHEST/LUNG: Clear to auscultation bilaterally; No rales, rhonchi, wheezing, or rubs. Unlabored respirations  HEART: Regular rate and rhythm; No murmurs, rubs, or gallops  ABDOMEN: Bowel sounds present; Soft, Nontender, Nondistended  EXTREMITIES:  2+ Peripheral Pulses, brisk capillary refill. No clubbing, cyanosis, or edema  NERVOUS SYSTEM: opens eyes to stimuli, tracks with eyes   MSK: contracted extremities   SKIN: warm, dry. skin lesions on b/l LE    LABS:                        11.2   12.04 )-----------( 126      ( 20 Nov 2023 05:20 )             33.8     CBC Full  -  ( 20 Nov 2023 05:20 )  WBC Count : 12.04 K/uL  Hemoglobin : 11.2 g/dL  Hematocrit : 33.8 %  Platelet Count - Automated : 126 K/uL  Mean Cell Volume : 82.6 fl  Mean Cell Hemoglobin : 27.4 pg  Mean Cell Hemoglobin Concentration : 33.1 gm/dL  Auto Neutrophil # : 10.20 K/uL  Auto Lymphocyte # : 1.15 K/uL  Auto Monocyte # : 0.54 K/uL  Auto Eosinophil # : 0.06 K/uL  Auto Basophil # : 0.05 K/uL  Auto Neutrophil % : 84.7 %  Auto Lymphocyte % : 9.6 %  Auto Monocyte % : 4.5 %  Auto Eosinophil % : 0.5 %  Auto Basophil % : 0.4 %    20 Nov 2023 05:20    146    |  113    |  15     ----------------------------<  113    3.5     |  27     |  0.96     Ca    8.0        20 Nov 2023 05:20  Mg     2.0       19 Nov 2023 21:02    TPro  5.8    /  Alb  2.9    /  TBili  0.7    /  DBili  x      /  AST  9      /  ALT  13     /  AlkPhos  83     20 Nov 2023 05:20    PT/INR - ( 19 Nov 2023 21:02 )   PT: 13.2 sec;   INR: 1.13 ratio         PTT - ( 19 Nov 2023 21:02 )  PTT:27.9 sec  Urinalysis Basic - ( 20 Nov 2023 05:20 )    Color: x / Appearance: x / SG: x / pH: x  Gluc: 113 mg/dL / Ketone: x  / Bili: x / Urobili: x   Blood: x / Protein: x / Nitrite: x   Leuk Esterase: x / RBC: x / WBC x   Sq Epi: x / Non Sq Epi: x / Bacteria: x      CAPILLARY BLOOD GLUCOSE      POCT Blood Glucose.: 192 mg/dL (20 Nov 2023 12:18)  POCT Blood Glucose.: 155 mg/dL (20 Nov 2023 07:58)          RADIOLOGY & ADDITIONAL TESTS:    Personally reviewed.     Consultant(s) Notes Reviewed:  [x] YES  [ ] NO

## 2023-11-20 NOTE — SWALLOW BEDSIDE ASSESSMENT ADULT - ASR SWALLOW RECOMMEND DIAG
2. Modified Barium Swallow study to objectively assess the swallow and determine whether results of chest imaging are dysphagia related

## 2023-11-20 NOTE — H&P ADULT - PROBLEM SELECTOR PLAN 10
- on eliquis for AFib - on eliquis for AFib      # GOC  - Pt is DNR with trial of intubation, confirmed by wife  - wife will bring in MOLST from home - on eliquis for AFib    # GOC  - Pt is DNR with trial of intubation, confirmed by wife  - wife will bring in MOLST from home

## 2023-11-20 NOTE — CARE COORDINATION ASSESSMENT. - OTHER PERTINENT REFERRAL INFORMATION
Pt alert and oriented X4. Pt lives in a private house with his spouse/Love Jarrell, the pt uses a ramp to get inside the house.  Pt deferred to his spouse, SW phoned Mrs. Love Jarrell at ([phone]) in order to conduct assessment.  Per wife The patient receives home care services from [facility] (M-F 10hrs and Sat - Sun 8 hrs).     Pt alert and oriented X4. Pt lives in a private house with his spouse/Love Jarrell, the pt uses a ramp to get inside the house.  Pt deferred to his spouse, SW phoned Mrs. Love Jarrell at ([phone]) in order to conduct assessment.  Per wife the patient receives home care services from [facility] (M-F 10hrs and Sat - Sun 8 hrs). Aides thru [facility].

## 2023-11-20 NOTE — PATIENT PROFILE ADULT - FUNCTIONAL ASSESSMENT - BASIC MOBILITY 6.
1-calculated by average/Not able to assess (calculate score using [facility] averaging method)

## 2023-11-20 NOTE — SWALLOW BEDSIDE ASSESSMENT ADULT - ADDITIONAL RECOMMENDATIONS
3. This service to follow-up as schedule permits for diet advancement. 4. Medical team further advised to reconsult this department with any change in medical status and/or observed change in tolerance of recommended PO diet.

## 2023-11-20 NOTE — H&P ADULT - PROBLEM SELECTOR PLAN 1
- pt with worsening cough, per wife has been coughing after drinking water  - Vitals stable, afebrile.  pt does not meet criteria for sepsis  - WBC 20.79,  lactate 1.9, Procal 0.25, sepsis not likely.  - Pro BNP 1166 (BNP 1654 in 9/2023)  -COVID neg. RVP neg.  - CT chest, Abd/pelvis:  Right lower lobe pneumonia and small right pleural effusion.   - s/p Zosyn, solumedrol 125 mg, duoneb and 2  L NS bolus in ED  - will continue Zosyn  - pt will be NPO pending bedside dysphagia screen and formal S&S eval  - start IVF NS 75 cc/hr x 12 hours  - FU BCx  - trend WBC, fever  - Consult ID, Dr. Manning,  recs - pt with worsening cough, per wife has been coughing after drinking water  - WBC 20.79,  lactate 1.9, Procal 0.25  - Pro BNP 1166 (BNP 1654 in 9/2023)  -COVID neg. RVP neg.  - CT chest, Abd/pelvis:  Right lower lobe pneumonia and small right pleural effusion.   - s/p Zosyn, solumedrol 125 mg, duoneb and 2  L NS bolus in ED  - will continue Zosyn  - pt will be NPO pending bedside dysphagia screen and formal S&S eval  - start IVF NS 75 cc/hr x 12 hours  - FU BCx  - trend WBC, fever  - Consult ID, Dr. Manning,  recs

## 2023-11-20 NOTE — CAREGIVER ENGAGEMENT NOTE - CAREGIVER EDUCATION NOTES - FREE TEXT
Pts spouse states they have all equipment needed at home. Pt will require ambulance transport home.  Pt aide via [facility], aide provided by [facility].

## 2023-11-20 NOTE — SWALLOW BEDSIDE ASSESSMENT ADULT - COMMENTS
As per H&P dated 11/20/23 "72 yo male with PMHx T2DM, peripheral neuropathy, HTN, HLD, A-fib (on Eliquis), TBI (s/p SDH with EVAC on 9/2021), and traumatic subdural hemorrhage in 10/2022 Covid PNA, and major depression, with recent admission at [facility] ICU from 9/25-9/30, presents with worsening cough and decreased mental status. Found to have RLL PNA likely aspiration. Admit for further management."    CT Chest 11/19/23 "IMPRESSION: Right lower lobe pneumonia and small right pleural effusion."    Patient is known to this service, seen during multiple previous admissions. Notably, patient seen for an MBS on 7/5/22 and 11/28/22. Most recently, patient was seen for a clinical swallow evaluation on 9/27/23 with recommendation of Soft and Bite Sized Solids with Thin Liquids.   Of note, patient is also known to SLP department at [facility]. (See notes for details).    Patient visited at bedside with wife present. Patient presents as awake and alert, able to follow 1-step directives. Limited verbalizations produced. Per wife, patient with ~4 pneumonias this year. Wife endorses patient with occasional coughing with intake of thin liquids and instances of bolus holding.

## 2023-11-20 NOTE — CONSULT NOTE ADULT - ASSESSMENT
74 yo male with PMHx T2DM, peripheral neuropathy, HTN, HLD, A-fib (on Eliquis), TBI (s/p SDH with EVAC on 9/2021), and traumatic subdural hemorrhage in 10/2022 Covid PNA, and major depression, with recent admission at [facility] ICU from 9/25-9/30, presents with worsening cough and decreased mental status. Day of admission the pt had worse coughing with phelgm, febrile    WBC 20.79, Hgb 10.7, lactate 1.9, Procal 0.25, sepsis not likely. Pro BNP 1166, COVID neg. RVP neg.  CT chest, Abd/pelvis:  Right lower lobe pneumonia and small right pleural effusion. No acute intra-abdominal or pelvic findings on unenhanced CT.    RECOMMENDATIONS  1-Aspiration PNA RLL story consistent and pt is at risk so rec  Zosyn (started) 11/20) anticipate 5 days but recs to follow  will order  -nares MRSA (prior +)  -urine legionella    Thank you for consulting us and involving us in the management of this most interesting and challenging case.  We will follow along in the care of this patient. Please call us at [phone] or text me directly on my cell# at [phone] with any concerns.

## 2023-11-21 LAB
ALBUMIN SERPL ELPH-MCNC: 2.7 G/DL — LOW (ref 3.3–5)
ALBUMIN SERPL ELPH-MCNC: 2.7 G/DL — LOW (ref 3.3–5)
ALP SERPL-CCNC: 70 U/L — SIGNIFICANT CHANGE UP (ref 40–120)
ALP SERPL-CCNC: 70 U/L — SIGNIFICANT CHANGE UP (ref 40–120)
ALT FLD-CCNC: 10 U/L — LOW (ref 12–78)
ALT FLD-CCNC: 10 U/L — LOW (ref 12–78)
ANION GAP SERPL CALC-SCNC: 6 MMOL/L — SIGNIFICANT CHANGE UP (ref 5–17)
ANION GAP SERPL CALC-SCNC: 6 MMOL/L — SIGNIFICANT CHANGE UP (ref 5–17)
APPEARANCE UR: ABNORMAL
APPEARANCE UR: ABNORMAL
AST SERPL-CCNC: 6 U/L — LOW (ref 15–37)
AST SERPL-CCNC: 6 U/L — LOW (ref 15–37)
BILIRUB SERPL-MCNC: 0.3 MG/DL — SIGNIFICANT CHANGE UP (ref 0.2–1.2)
BILIRUB SERPL-MCNC: 0.3 MG/DL — SIGNIFICANT CHANGE UP (ref 0.2–1.2)
BILIRUB UR-MCNC: NEGATIVE — SIGNIFICANT CHANGE UP
BILIRUB UR-MCNC: NEGATIVE — SIGNIFICANT CHANGE UP
BUN SERPL-MCNC: 27 MG/DL — HIGH (ref 7–23)
BUN SERPL-MCNC: 27 MG/DL — HIGH (ref 7–23)
CALCIUM SERPL-MCNC: 8.3 MG/DL — LOW (ref 8.5–10.1)
CALCIUM SERPL-MCNC: 8.3 MG/DL — LOW (ref 8.5–10.1)
CHLORIDE SERPL-SCNC: 113 MMOL/L — HIGH (ref 96–108)
CHLORIDE SERPL-SCNC: 113 MMOL/L — HIGH (ref 96–108)
CO2 SERPL-SCNC: 25 MMOL/L — SIGNIFICANT CHANGE UP (ref 22–31)
CO2 SERPL-SCNC: 25 MMOL/L — SIGNIFICANT CHANGE UP (ref 22–31)
COLOR SPEC: YELLOW — SIGNIFICANT CHANGE UP
COLOR SPEC: YELLOW — SIGNIFICANT CHANGE UP
CREAT SERPL-MCNC: 1.1 MG/DL — SIGNIFICANT CHANGE UP (ref 0.5–1.3)
CREAT SERPL-MCNC: 1.1 MG/DL — SIGNIFICANT CHANGE UP (ref 0.5–1.3)
DIFF PNL FLD: NEGATIVE — SIGNIFICANT CHANGE UP
DIFF PNL FLD: NEGATIVE — SIGNIFICANT CHANGE UP
EGFR: 72 ML/MIN/1.73M2 — SIGNIFICANT CHANGE UP
EGFR: 72 ML/MIN/1.73M2 — SIGNIFICANT CHANGE UP
GLUCOSE SERPL-MCNC: 167 MG/DL — HIGH (ref 70–99)
GLUCOSE SERPL-MCNC: 167 MG/DL — HIGH (ref 70–99)
GLUCOSE UR QL: NEGATIVE MG/DL — SIGNIFICANT CHANGE UP
GLUCOSE UR QL: NEGATIVE MG/DL — SIGNIFICANT CHANGE UP
HCT VFR BLD CALC: 27.5 % — LOW (ref 39–50)
HCT VFR BLD CALC: 27.5 % — LOW (ref 39–50)
HGB BLD-MCNC: 9 G/DL — LOW (ref 13–17)
HGB BLD-MCNC: 9 G/DL — LOW (ref 13–17)
KETONES UR-MCNC: ABNORMAL MG/DL
KETONES UR-MCNC: ABNORMAL MG/DL
LEGIONELLA AG UR QL: NEGATIVE — SIGNIFICANT CHANGE UP
LEGIONELLA AG UR QL: NEGATIVE — SIGNIFICANT CHANGE UP
LEUKOCYTE ESTERASE UR-ACNC: NEGATIVE — SIGNIFICANT CHANGE UP
LEUKOCYTE ESTERASE UR-ACNC: NEGATIVE — SIGNIFICANT CHANGE UP
MCHC RBC-ENTMCNC: 26.9 PG — LOW (ref 27–34)
MCHC RBC-ENTMCNC: 26.9 PG — LOW (ref 27–34)
MCHC RBC-ENTMCNC: 32.7 GM/DL — SIGNIFICANT CHANGE UP (ref 32–36)
MCHC RBC-ENTMCNC: 32.7 GM/DL — SIGNIFICANT CHANGE UP (ref 32–36)
MCV RBC AUTO: 82.1 FL — SIGNIFICANT CHANGE UP (ref 80–100)
MCV RBC AUTO: 82.1 FL — SIGNIFICANT CHANGE UP (ref 80–100)
MRSA PCR RESULT.: DETECTED
MRSA PCR RESULT.: DETECTED
NITRITE UR-MCNC: NEGATIVE — SIGNIFICANT CHANGE UP
NITRITE UR-MCNC: NEGATIVE — SIGNIFICANT CHANGE UP
NRBC # BLD: 0 /100 WBCS — SIGNIFICANT CHANGE UP (ref 0–0)
NRBC # BLD: 0 /100 WBCS — SIGNIFICANT CHANGE UP (ref 0–0)
PH UR: 5 — SIGNIFICANT CHANGE UP (ref 5–8)
PH UR: 5 — SIGNIFICANT CHANGE UP (ref 5–8)
PLATELET # BLD AUTO: 233 K/UL — SIGNIFICANT CHANGE UP (ref 150–400)
PLATELET # BLD AUTO: 233 K/UL — SIGNIFICANT CHANGE UP (ref 150–400)
POTASSIUM SERPL-MCNC: 3.7 MMOL/L — SIGNIFICANT CHANGE UP (ref 3.5–5.3)
POTASSIUM SERPL-MCNC: 3.7 MMOL/L — SIGNIFICANT CHANGE UP (ref 3.5–5.3)
POTASSIUM SERPL-SCNC: 3.7 MMOL/L — SIGNIFICANT CHANGE UP (ref 3.5–5.3)
POTASSIUM SERPL-SCNC: 3.7 MMOL/L — SIGNIFICANT CHANGE UP (ref 3.5–5.3)
PROT SERPL-MCNC: 5.6 G/DL — LOW (ref 6–8.3)
PROT SERPL-MCNC: 5.6 G/DL — LOW (ref 6–8.3)
PROT UR-MCNC: 100 MG/DL
PROT UR-MCNC: 100 MG/DL
RBC # BLD: 3.35 M/UL — LOW (ref 4.2–5.8)
RBC # BLD: 3.35 M/UL — LOW (ref 4.2–5.8)
RBC # FLD: 14.7 % — HIGH (ref 10.3–14.5)
RBC # FLD: 14.7 % — HIGH (ref 10.3–14.5)
S AUREUS DNA NOSE QL NAA+PROBE: DETECTED
S AUREUS DNA NOSE QL NAA+PROBE: DETECTED
SODIUM SERPL-SCNC: 144 MMOL/L — SIGNIFICANT CHANGE UP (ref 135–145)
SODIUM SERPL-SCNC: 144 MMOL/L — SIGNIFICANT CHANGE UP (ref 135–145)
SP GR SPEC: 1.02 — SIGNIFICANT CHANGE UP (ref 1–1.03)
SP GR SPEC: 1.02 — SIGNIFICANT CHANGE UP (ref 1–1.03)
UROBILINOGEN FLD QL: 0.2 MG/DL — SIGNIFICANT CHANGE UP (ref 0.2–1)
UROBILINOGEN FLD QL: 0.2 MG/DL — SIGNIFICANT CHANGE UP (ref 0.2–1)
WBC # BLD: 6.21 K/UL — SIGNIFICANT CHANGE UP (ref 3.8–10.5)
WBC # BLD: 6.21 K/UL — SIGNIFICANT CHANGE UP (ref 3.8–10.5)
WBC # FLD AUTO: 6.21 K/UL — SIGNIFICANT CHANGE UP (ref 3.8–10.5)
WBC # FLD AUTO: 6.21 K/UL — SIGNIFICANT CHANGE UP (ref 3.8–10.5)

## 2023-11-21 PROCEDURE — 99233 SBSQ HOSP IP/OBS HIGH 50: CPT

## 2023-11-21 RX ADMIN — Medication 25 MILLIGRAM(S): at 08:09

## 2023-11-21 RX ADMIN — GABAPENTIN 400 MILLIGRAM(S): 400 CAPSULE ORAL at 08:11

## 2023-11-21 RX ADMIN — MIRTAZAPINE 7.5 MILLIGRAM(S): 45 TABLET, ORALLY DISINTEGRATING ORAL at 12:24

## 2023-11-21 RX ADMIN — APIXABAN 2.5 MILLIGRAM(S): 2.5 TABLET, FILM COATED ORAL at 08:09

## 2023-11-21 RX ADMIN — Medication 25 MILLIGRAM(S): at 15:06

## 2023-11-21 RX ADMIN — Medication 25 MILLIGRAM(S): at 23:09

## 2023-11-21 RX ADMIN — PIPERACILLIN AND TAZOBACTAM 25 GRAM(S): 4; .5 INJECTION, POWDER, LYOPHILIZED, FOR SOLUTION INTRAVENOUS at 05:31

## 2023-11-21 RX ADMIN — SENNA PLUS 2 TABLET(S): 8.6 TABLET ORAL at 23:09

## 2023-11-21 RX ADMIN — GABAPENTIN 400 MILLIGRAM(S): 400 CAPSULE ORAL at 15:06

## 2023-11-21 RX ADMIN — Medication 240 MILLIGRAM(S): at 08:11

## 2023-11-21 RX ADMIN — LATANOPROST 1 DROP(S): 0.05 SOLUTION/ DROPS OPHTHALMIC; TOPICAL at 23:10

## 2023-11-21 RX ADMIN — MODAFINIL 100 MILLIGRAM(S): 200 TABLET ORAL at 15:06

## 2023-11-21 RX ADMIN — Medication 1: at 08:06

## 2023-11-21 RX ADMIN — GABAPENTIN 400 MILLIGRAM(S): 400 CAPSULE ORAL at 23:09

## 2023-11-21 RX ADMIN — Medication 2: at 12:23

## 2023-11-21 RX ADMIN — PIPERACILLIN AND TAZOBACTAM 25 GRAM(S): 4; .5 INJECTION, POWDER, LYOPHILIZED, FOR SOLUTION INTRAVENOUS at 15:06

## 2023-11-21 RX ADMIN — PIPERACILLIN AND TAZOBACTAM 25 GRAM(S): 4; .5 INJECTION, POWDER, LYOPHILIZED, FOR SOLUTION INTRAVENOUS at 23:08

## 2023-11-21 RX ADMIN — APIXABAN 2.5 MILLIGRAM(S): 2.5 TABLET, FILM COATED ORAL at 17:56

## 2023-11-21 NOTE — PROGRESS NOTE ADULT - NSPROGADDITIONALINFOA_GEN_ALL_CORE
abnormal imaging findings:   Moth eaten lesion on right humerus - prior imaging reviewed (b.l humerus from september) will obtain futher xrays and if indicated MRI. d/w wife.
abnormal imaging findings:   Moth eaten lesion on right humerus - prior imaging reviewed (b.l humerus from september) will obtain futher xrays and if indicated MRI. d/w wife.

## 2023-11-21 NOTE — PROGRESS NOTE ADULT - ASSESSMENT
72 yo male with PMHx T2DM, peripheral neuropathy, HTN, HLD, A-fib (on Eliquis), TBI (s/p SDH with EVAC on 9/2021), and traumatic subdural hemorrhage in 10/2022 Covid PNA, and major depression, with recent admission at [facility] ICU from 9/25-9/30, presents with worsening cough and decreased mental status. Day of admission the pt had worse coughing with phelgm, febrile    WBC 20.79, Hgb 10.7, lactate 1.9, Procal 0.25, sepsis not likely. Pro BNP 1166, COVID neg. RVP neg.  CT chest, Abd/pelvis:  Right lower lobe pneumonia and small right pleural effusion. No acute intra-abdominal or pelvic findings on unenhanced CT.    RECOMMENDATIONS  1-Aspiration PNA RLL story consistent and pt is at risk so rec  Zosyn (started late 11/29) anticipate 5 days so last day 11/24 but as discussed with wife potential to finish course with Vantin 200mg PO BID and very likley as pt is already improved with normalized wbc and afebrile  -nares MRSA (prior +)  -urine legionella    Thank you for consulting us and involving us in the management of this most interesting and challenging case.  We will follow along in the care of this patient. Please call us at [phone] or text me directly on my cell# at [phone] with any concerns.

## 2023-11-21 NOTE — PROGRESS NOTE ADULT - SUBJECTIVE AND OBJECTIVE BOX
Patient is a 73y old  Male who presents with a chief complaint of aspiration PNA (21 Nov 2023 09:24)      Subjective:  INTERVAL HPI/OVERNIGHT EVENTS: Patient seen and examined at bedside. No overnight events occurred. This am pt refused his morning medications. Will try to administer when his wife arrives later this am. Pt answering some questions this morning. denies any pain or difficulty breathing, admits to feeling better (yes and no answers)    MEDICATIONS  (STANDING):  apixaban 2.5 milliGRAM(s) Oral every 12 hours  dextrose 5%. 1000 milliLiter(s) (100 mL/Hr) IV Continuous <Continuous>  dextrose 5%. 1000 milliLiter(s) (50 mL/Hr) IV Continuous <Continuous>  dextrose 50% Injectable 25 Gram(s) IV Push once  dextrose 50% Injectable 12.5 Gram(s) IV Push once  dextrose 50% Injectable 25 Gram(s) IV Push once  diltiazem    milliGRAM(s) Oral daily  gabapentin 400 milliGRAM(s) Oral three times a day  glucagon  Injectable 1 milliGRAM(s) IntraMuscular once  hydrALAZINE 25 milliGRAM(s) Oral three times a day  influenza  Vaccine (HIGH DOSE) 0.7 milliLiter(s) IntraMuscular once  insulin lispro (ADMELOG) corrective regimen sliding scale   SubCutaneous three times a day before meals  insulin lispro (ADMELOG) corrective regimen sliding scale   SubCutaneous at bedtime  latanoprost 0.005% Ophthalmic Solution 1 Drop(s) Both EYES at bedtime  mirtazapine 7.5 milliGRAM(s) Oral daily  modafinil 100 milliGRAM(s) Oral daily  pantoprazole    Tablet 40 milliGRAM(s) Oral before breakfast  piperacillin/tazobactam IVPB.. 3.375 Gram(s) IV Intermittent every 8 hours  senna 2 Tablet(s) Oral at bedtime  sodium chloride 0.9%. 1000 milliLiter(s) (75 mL/Hr) IV Continuous <Continuous>    MEDICATIONS  (PRN):  dextrose Oral Gel 15 Gram(s) Oral once PRN Blood Glucose LESS THAN 70 milliGRAM(s)/deciliter      Allergies    No Known Allergies    Intolerances        REVIEW OF SYSTEMS:  limited due to baseline mental status  no cp or shortness of breath   no pain     Objective:  Vital Signs Last 24 Hrs  T(C): 36.4 (21 Nov 2023 04:59), Max: 36.8 (20 Nov 2023 12:32)  T(F): 97.6 (21 Nov 2023 04:59), Max: 98.2 (20 Nov 2023 12:32)  HR: 96 (21 Nov 2023 04:59) (92 - 101)  BP: 124/73 (21 Nov 2023 04:59) (124/73 - 169/76)  BP(mean): --  RR: 17 (21 Nov 2023 04:59) (16 - 18)  SpO2: 100% (21 Nov 2023 04:59) (96% - 100%)    Parameters below as of 21 Nov 2023 04:59  Patient On (Oxygen Delivery Method): room air      GENERAL: NAD, lying in bed comfortably, smiling. tracking with eyes. answering yes or no questions   HEAD:  Atraumatic, Normocephalic  EYES: EOMI,  conjunctiva and sclera clear  ENT: Moist mucous membranes  NECK: Supple, No JVD  CHEST/LUNG: transmitted upper airway sounds, no w/w/r, no accessory muscle use. on Room air  HEART: Regular rate and rhythm; No murmurs, rubs, or gallops  ABDOMEN: Bowel sounds present; Soft, Nontender, Nondistended. No hepatomegaly  EXTREMITIES:  2+ Peripheral Pulses, brisk capillary refill. No clubbing, cyanosis, or edema  NERVOUS SYSTEM:  Awake, responsive, calm , cooperative  MSK: contracted extremities   SKIN: Warm dry, scattered dry skin lesions     LABS:                        9.0    6.21  )-----------( 233      ( 21 Nov 2023 06:30 )             27.5     CBC Full  -  ( 21 Nov 2023 06:30 )  WBC Count : 6.21 K/uL  Hemoglobin : 9.0 g/dL  Hematocrit : 27.5 %  Platelet Count - Automated : 233 K/uL  Mean Cell Volume : 82.1 fl  Mean Cell Hemoglobin : 26.9 pg  Mean Cell Hemoglobin Concentration : 32.7 gm/dL  Auto Neutrophil # : x  Auto Lymphocyte # : x  Auto Monocyte # : x  Auto Eosinophil # : x  Auto Basophil # : x  Auto Neutrophil % : x  Auto Lymphocyte % : x  Auto Monocyte % : x  Auto Eosinophil % : x  Auto Basophil % : x    21 Nov 2023 06:30    144    |  113    |  27     ----------------------------<  167    3.7     |  25     |  1.10     Ca    8.3        21 Nov 2023 06:30    TPro  5.6    /  Alb  2.7    /  TBili  0.3    /  DBili  x      /  AST  6      /  ALT  10     /  AlkPhos  70     21 Nov 2023 06:30    PT/INR - ( 19 Nov 2023 21:02 )   PT: 13.2 sec;   INR: 1.13 ratio         PTT - ( 19 Nov 2023 21:02 )  PTT:27.9 sec  Urinalysis Basic - ( 21 Nov 2023 06:30 )    Color: x / Appearance: x / SG: x / pH: x  Gluc: 167 mg/dL / Ketone: x  / Bili: x / Urobili: x   Blood: x / Protein: x / Nitrite: x   Leuk Esterase: x / RBC: x / WBC x   Sq Epi: x / Non Sq Epi: x / Bacteria: x      CAPILLARY BLOOD GLUCOSE      POCT Blood Glucose.: 170 mg/dL (21 Nov 2023 07:30)  POCT Blood Glucose.: 271 mg/dL (20 Nov 2023 21:43)  POCT Blood Glucose.: 192 mg/dL (20 Nov 2023 19:37)  POCT Blood Glucose.: 164 mg/dL (20 Nov 2023 18:10)  POCT Blood Glucose.: 192 mg/dL (20 Nov 2023 12:18)        Culture - Blood (collected 11-19-23 @ 21:25)  Source: .Blood Blood-Peripheral  Preliminary Report (11-21-23 @ 07:02):    No growth at 24 hours    Culture - Blood (collected 11-19-23 @ 21:20)  Source: .Blood Blood-Peripheral  Preliminary Report (11-21-23 @ 07:02):    No growth at 24 hours        RADIOLOGY & ADDITIONAL TESTS:    Personally reviewed.     Consultant(s) Notes Reviewed:  [x] YES  [ ] NO

## 2023-11-21 NOTE — PROVIDER CONTACT NOTE (OTHER) - ACTION/TREATMENT ORDERED:
MD states he is familiar with pt. Will reach out to wife to see if she can come early to help encourage pt to take medications. Addendum- Dr. Moya spoke to pt's wife and she will be in 8 or 9 this am to assist with med pass

## 2023-11-21 NOTE — PROGRESS NOTE ADULT - SUBJECTIVE AND OBJECTIVE BOX
Date/Time Patient Seen:  		  Referring MD:   Data Reviewed	       Patient is a 73y old  Male who presents with a chief complaint of aspiration PNA (20 Nov 2023 17:57)      Subjective/HPI     PAST MEDICAL & SURGICAL HISTORY:  TBI (traumatic brain injury)    HTN (hypertension)    Atrial fibrillation    DM (diabetes mellitus)    Peripheral neuropathy    Major depression    HLD (hyperlipidemia)    CAD (coronary artery disease)    BPH (benign prostatic hyperplasia)    Pneumonia due to COVID-19 virus  June 2022    Hemorrhage in the brain    No significant past surgical history    No significant past surgical history    H/O craniotomy          Medication list         MEDICATIONS  (STANDING):  apixaban 2.5 milliGRAM(s) Oral every 12 hours  dextrose 5%. 1000 milliLiter(s) (100 mL/Hr) IV Continuous <Continuous>  dextrose 5%. 1000 milliLiter(s) (50 mL/Hr) IV Continuous <Continuous>  dextrose 50% Injectable 25 Gram(s) IV Push once  dextrose 50% Injectable 12.5 Gram(s) IV Push once  dextrose 50% Injectable 25 Gram(s) IV Push once  diltiazem    milliGRAM(s) Oral daily  gabapentin 400 milliGRAM(s) Oral three times a day  glucagon  Injectable 1 milliGRAM(s) IntraMuscular once  hydrALAZINE 25 milliGRAM(s) Oral three times a day  influenza  Vaccine (HIGH DOSE) 0.7 milliLiter(s) IntraMuscular once  insulin lispro (ADMELOG) corrective regimen sliding scale   SubCutaneous three times a day before meals  insulin lispro (ADMELOG) corrective regimen sliding scale   SubCutaneous at bedtime  latanoprost 0.005% Ophthalmic Solution 1 Drop(s) Both EYES at bedtime  mirtazapine 7.5 milliGRAM(s) Oral daily  modafinil 100 milliGRAM(s) Oral daily  pantoprazole    Tablet 40 milliGRAM(s) Oral before breakfast  piperacillin/tazobactam IVPB.. 3.375 Gram(s) IV Intermittent every 8 hours  senna 2 Tablet(s) Oral at bedtime  sodium chloride 0.9%. 1000 milliLiter(s) (75 mL/Hr) IV Continuous <Continuous>    MEDICATIONS  (PRN):  dextrose Oral Gel 15 Gram(s) Oral once PRN Blood Glucose LESS THAN 70 milliGRAM(s)/deciliter         Vitals log        ICU Vital Signs Last 24 Hrs  T(C): 36.4 (20 Nov 2023 20:18), Max: 36.8 (20 Nov 2023 12:32)  T(F): 97.5 (20 Nov 2023 20:18), Max: 98.2 (20 Nov 2023 12:32)  HR: 93 (20 Nov 2023 22:00) (92 - 101)  BP: 155/95 (20 Nov 2023 22:00) (136/87 - 169/76)  BP(mean): --  ABP: --  ABP(mean): --  RR: 17 (20 Nov 2023 20:18) (16 - 18)  SpO2: 96% (20 Nov 2023 20:18) (96% - 100%)    O2 Parameters below as of 20 Nov 2023 20:18  Patient On (Oxygen Delivery Method): room air                 Input and Output:  I&O's Detail      Lab Data                        11.2   12.04 )-----------( 126      ( 20 Nov 2023 05:20 )             33.8     11-20    146<H>  |  113<H>  |  15  ----------------------------<  113<H>  3.5   |  27  |  0.96    Ca    8.0<L>      20 Nov 2023 05:20  Mg     2.0     11-19    TPro  5.8<L>  /  Alb  2.9<L>  /  TBili  0.7  /  DBili  x   /  AST  9<L>  /  ALT  13  /  AlkPhos  83  11-20            Review of Systems	      Objective     Physical Examination    heart s1s2  lung dec BS  head nc      Pertinent Lab findings & Imaging      Geraldo:  NO   Adequate UO     I&O's Detail           Discussed with:     Cultures:	        Radiology

## 2023-11-21 NOTE — PROGRESS NOTE ADULT - SUBJECTIVE AND OBJECTIVE BOX
[facility] DIV[facility] of INFECTIOUS DISEASE  Ovidio Manning MD PhD, Jojo Boggs MD, Hafsa Bhardwaj MD, Danica Goldsmith MD, Samy Terry MD  and providing coverage with Meet Perea MD  Providing Infectious Disease Consultations at [facility], [facility], [facility]'s    Office# [phone] to schedule follow up appointments  Answering Service for urgent calls or New Consults [phone]  Cell# to text for urgent issues Ovidio Manning [phone]     infectious diseases progress note:    MELBA PARMAR is a 73y y. o. Male patient    Overnight and events of the last 24hrs reviewed    Allergies    No Known Allergies    Intolerances        ANTIBIOTICS/RELEVANT:  antimicrobials  piperacillin/tazobactam IVPB.. 3.375 Gram(s) IV Intermittent every 8 hours    immunologic:  influenza  Vaccine (HIGH DOSE) 0.7 milliLiter(s) IntraMuscular once    OTHER:  apixaban 2.5 milliGRAM(s) Oral every 12 hours  dextrose 5%. 1000 milliLiter(s) IV Continuous <Continuous>  dextrose 5%. 1000 milliLiter(s) IV Continuous <Continuous>  dextrose 50% Injectable 25 Gram(s) IV Push once  dextrose 50% Injectable 12.5 Gram(s) IV Push once  dextrose 50% Injectable 25 Gram(s) IV Push once  dextrose Oral Gel 15 Gram(s) Oral once PRN  diltiazem    milliGRAM(s) Oral daily  gabapentin 400 milliGRAM(s) Oral three times a day  glucagon  Injectable 1 milliGRAM(s) IntraMuscular once  hydrALAZINE 25 milliGRAM(s) Oral three times a day  insulin lispro (ADMELOG) corrective regimen sliding scale   SubCutaneous three times a day before meals  insulin lispro (ADMELOG) corrective regimen sliding scale   SubCutaneous at bedtime  latanoprost 0.005% Ophthalmic Solution 1 Drop(s) Both EYES at bedtime  mirtazapine 7.5 milliGRAM(s) Oral daily  modafinil 100 milliGRAM(s) Oral daily  pantoprazole    Tablet 40 milliGRAM(s) Oral before breakfast  senna 2 Tablet(s) Oral at bedtime  sodium chloride 0.9%. 1000 milliLiter(s) IV Continuous <Continuous>      Objective:  Vital Signs Last 24 Hrs  T(C): 36.4 (21 Nov 2023 04:59), Max: 36.8 (20 Nov 2023 12:32)  T(F): 97.6 (21 Nov 2023 04:59), Max: 98.2 (20 Nov 2023 12:32)  HR: 96 (21 Nov 2023 04:59) (92 - 101)  BP: 124/73 (21 Nov 2023 04:59) (124/73 - 169/76)  BP(mean): --  RR: 17 (21 Nov 2023 04:59) (16 - 18)  SpO2: 100% (21 Nov 2023 04:59) (96% - 100%)    Parameters below as of 21 Nov 2023 04:59  Patient On (Oxygen Delivery Method): room air        T(C): 36.4 (11-21-23 @ 04:59), Max: 36.8 (11-20-23 @ 12:32)  T(C): 36.4 (11-21-23 @ 04:59), Max: 36.8 (11-20-23 @ 12:32)  T(C): 36.4 (11-21-23 @ 04:59), Max: 36.8 (11-20-23 @ 12:32)    PHYSICAL EXAM:  HEENT: NC atraumatic  Neck: supple  Respiratory: no accessory muscle use, breathing comfortably  Cardiovascular: distant  Gastrointestinal: normal appearing, nondistended  Extremities: no clubbing, no cyanosis,        LABS:                          9.0    6.21  )-----------( 233      ( 21 Nov 2023 06:30 )             27.5       WBC  6.21 11-21 @ 06:30  12.04 11-20 @ 05:20  20.79 11-19 @ 21:02      11-21    144  |  113<H>  |  27<H>  ----------------------------<  167<H>  3.7   |  25  |  1.10    Ca    8.3<L>      21 Nov 2023 06:30  Mg     2.0     11-19    TPro  5.6<L>  /  Alb  2.7<L>  /  TBili  0.3  /  DBili  x   /  AST  6<L>  /  ALT  10<L>  /  AlkPhos  70  11-21      Creatinine: 1.10 mg/dL (11-21-23 @ 06:30)  Creatinine: 0.96 mg/dL (11-20-23 @ 05:20)  Creatinine: 1.00 mg/dL (11-19-23 @ 21:02)      PT/INR - ( 19 Nov 2023 21:02 )   PT: 13.2 sec;   INR: 1.13 ratio         PTT - ( 19 Nov 2023 21:02 )  PTT:27.9 sec  Urinalysis Basic - ( 21 Nov 2023 06:30 )    Color: x / Appearance: x / SG: x / pH: x  Gluc: 167 mg/dL / Ketone: x  / Bili: x / Urobili: x   Blood: x / Protein: x / Nitrite: x   Leuk Esterase: x / RBC: x / WBC x   Sq Epi: x / Non Sq Epi: x / Bacteria: x            INFLAMMATORY MARKERS      MICROBIOLOGY:              RADIOLOGY & ADDITIONAL STUDIES:

## 2023-11-21 NOTE — PROVIDER CONTACT NOTE (OTHER) - ASSESSMENT
Pt refusing to open mouth, will not take medication. Important meds due; Cardizem, Eliquis, Hydralazine

## 2023-11-21 NOTE — PROGRESS NOTE ADULT - ASSESSMENT
72 yo male with PMHx T2DM, peripheral neuropathy, HTN, HLD, A-fib (on Eliquis), TBI (s/p SDH with EVAC on 9/2021), and traumatic subdural hemorrhage in 10/2022 Covid PNA, and major depression, with recent admission at [facility] ICU from 9/25-9/30, presents with worsening cough and decreased mental status.  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 Metformin was discontinued at this time.    pna  ams  frailty  weakness  DM  neuropathy  HLD  HTN  TBI hx   SDH hx  Depression    PO diet  on ABX  ID eval noted  vs noted    SLP eval done  emp ABX  HOB elev  asp prec  oral hygiene  assist with needs  monitor mentation  DM care  serial FS  old records reviewed  TTE reviewed - grossly int  prognosis guarded  GOC - pt is DNR

## 2023-11-21 NOTE — PROGRESS NOTE ADULT - PROBLEM SELECTOR PLAN 2
- pt with hx dysphagia, was on soft and bite sized diet at home  - wife reports pt has been coughing after drinking water  - CT chest with RLL PNA, likely 2/2 aspiration event  - start soft bite size, mildly thick per S&S recs  - aspiration precautions  - head of bed elevated

## 2023-11-21 NOTE — PROVIDER CONTACT NOTE (OTHER) - RECOMMENDATIONS
MD states he is familiar with pt. Will reach out to wife to see if she can come early to help encourage pt to take medications.

## 2023-11-22 LAB
ALBUMIN SERPL ELPH-MCNC: 2.7 G/DL — LOW (ref 3.3–5)
ALBUMIN SERPL ELPH-MCNC: 2.7 G/DL — LOW (ref 3.3–5)
ALP SERPL-CCNC: 62 U/L — SIGNIFICANT CHANGE UP (ref 40–120)
ALP SERPL-CCNC: 62 U/L — SIGNIFICANT CHANGE UP (ref 40–120)
ALT FLD-CCNC: 10 U/L — LOW (ref 12–78)
ALT FLD-CCNC: 10 U/L — LOW (ref 12–78)
ANION GAP SERPL CALC-SCNC: 8 MMOL/L — SIGNIFICANT CHANGE UP (ref 5–17)
ANION GAP SERPL CALC-SCNC: 8 MMOL/L — SIGNIFICANT CHANGE UP (ref 5–17)
ANION GAP SERPL CALC-SCNC: 9 MMOL/L — SIGNIFICANT CHANGE UP (ref 5–17)
ANION GAP SERPL CALC-SCNC: 9 MMOL/L — SIGNIFICANT CHANGE UP (ref 5–17)
AST SERPL-CCNC: 8 U/L — LOW (ref 15–37)
AST SERPL-CCNC: 8 U/L — LOW (ref 15–37)
BILIRUB SERPL-MCNC: 0.4 MG/DL — SIGNIFICANT CHANGE UP (ref 0.2–1.2)
BILIRUB SERPL-MCNC: 0.4 MG/DL — SIGNIFICANT CHANGE UP (ref 0.2–1.2)
BUN SERPL-MCNC: 26 MG/DL — HIGH (ref 7–23)
CALCIUM SERPL-MCNC: 8 MG/DL — LOW (ref 8.5–10.1)
CALCIUM SERPL-MCNC: 8 MG/DL — LOW (ref 8.5–10.1)
CALCIUM SERPL-MCNC: 8.2 MG/DL — LOW (ref 8.5–10.1)
CALCIUM SERPL-MCNC: 8.2 MG/DL — LOW (ref 8.5–10.1)
CHLORIDE SERPL-SCNC: 110 MMOL/L — HIGH (ref 96–108)
CHLORIDE SERPL-SCNC: 110 MMOL/L — HIGH (ref 96–108)
CHLORIDE SERPL-SCNC: 114 MMOL/L — HIGH (ref 96–108)
CHLORIDE SERPL-SCNC: 114 MMOL/L — HIGH (ref 96–108)
CO2 SERPL-SCNC: 26 MMOL/L — SIGNIFICANT CHANGE UP (ref 22–31)
CREAT SERPL-MCNC: 0.95 MG/DL — SIGNIFICANT CHANGE UP (ref 0.5–1.3)
CREAT SERPL-MCNC: 0.95 MG/DL — SIGNIFICANT CHANGE UP (ref 0.5–1.3)
CREAT SERPL-MCNC: 0.99 MG/DL — SIGNIFICANT CHANGE UP (ref 0.5–1.3)
CREAT SERPL-MCNC: 0.99 MG/DL — SIGNIFICANT CHANGE UP (ref 0.5–1.3)
CULTURE RESULTS: NO GROWTH — SIGNIFICANT CHANGE UP
CULTURE RESULTS: NO GROWTH — SIGNIFICANT CHANGE UP
EGFR: 81 ML/MIN/1.73M2 — SIGNIFICANT CHANGE UP
EGFR: 81 ML/MIN/1.73M2 — SIGNIFICANT CHANGE UP
EGFR: 86 ML/MIN/1.73M2 — SIGNIFICANT CHANGE UP
EGFR: 86 ML/MIN/1.73M2 — SIGNIFICANT CHANGE UP
GLUCOSE SERPL-MCNC: 119 MG/DL — HIGH (ref 70–99)
GLUCOSE SERPL-MCNC: 119 MG/DL — HIGH (ref 70–99)
GLUCOSE SERPL-MCNC: 124 MG/DL — HIGH (ref 70–99)
GLUCOSE SERPL-MCNC: 124 MG/DL — HIGH (ref 70–99)
HCT VFR BLD CALC: 28 % — LOW (ref 39–50)
HCT VFR BLD CALC: 28 % — LOW (ref 39–50)
HGB BLD-MCNC: 9 G/DL — LOW (ref 13–17)
HGB BLD-MCNC: 9 G/DL — LOW (ref 13–17)
MAGNESIUM SERPL-MCNC: 2 MG/DL — SIGNIFICANT CHANGE UP (ref 1.6–2.6)
MAGNESIUM SERPL-MCNC: 2 MG/DL — SIGNIFICANT CHANGE UP (ref 1.6–2.6)
MCHC RBC-ENTMCNC: 26.7 PG — LOW (ref 27–34)
MCHC RBC-ENTMCNC: 26.7 PG — LOW (ref 27–34)
MCHC RBC-ENTMCNC: 32.1 GM/DL — SIGNIFICANT CHANGE UP (ref 32–36)
MCHC RBC-ENTMCNC: 32.1 GM/DL — SIGNIFICANT CHANGE UP (ref 32–36)
MCV RBC AUTO: 83.1 FL — SIGNIFICANT CHANGE UP (ref 80–100)
MCV RBC AUTO: 83.1 FL — SIGNIFICANT CHANGE UP (ref 80–100)
NRBC # BLD: 0 /100 WBCS — SIGNIFICANT CHANGE UP (ref 0–0)
NRBC # BLD: 0 /100 WBCS — SIGNIFICANT CHANGE UP (ref 0–0)
PLATELET # BLD AUTO: 212 K/UL — SIGNIFICANT CHANGE UP (ref 150–400)
PLATELET # BLD AUTO: 212 K/UL — SIGNIFICANT CHANGE UP (ref 150–400)
POTASSIUM SERPL-MCNC: 3.3 MMOL/L — LOW (ref 3.5–5.3)
POTASSIUM SERPL-MCNC: 3.3 MMOL/L — LOW (ref 3.5–5.3)
POTASSIUM SERPL-MCNC: 3.9 MMOL/L — SIGNIFICANT CHANGE UP (ref 3.5–5.3)
POTASSIUM SERPL-MCNC: 3.9 MMOL/L — SIGNIFICANT CHANGE UP (ref 3.5–5.3)
POTASSIUM SERPL-SCNC: 3.3 MMOL/L — LOW (ref 3.5–5.3)
POTASSIUM SERPL-SCNC: 3.3 MMOL/L — LOW (ref 3.5–5.3)
POTASSIUM SERPL-SCNC: 3.9 MMOL/L — SIGNIFICANT CHANGE UP (ref 3.5–5.3)
POTASSIUM SERPL-SCNC: 3.9 MMOL/L — SIGNIFICANT CHANGE UP (ref 3.5–5.3)
PROT SERPL-MCNC: 5.4 G/DL — LOW (ref 6–8.3)
PROT SERPL-MCNC: 5.4 G/DL — LOW (ref 6–8.3)
RBC # BLD: 3.37 M/UL — LOW (ref 4.2–5.8)
RBC # BLD: 3.37 M/UL — LOW (ref 4.2–5.8)
RBC # FLD: 14.9 % — HIGH (ref 10.3–14.5)
RBC # FLD: 14.9 % — HIGH (ref 10.3–14.5)
SODIUM SERPL-SCNC: 145 MMOL/L — SIGNIFICANT CHANGE UP (ref 135–145)
SODIUM SERPL-SCNC: 145 MMOL/L — SIGNIFICANT CHANGE UP (ref 135–145)
SODIUM SERPL-SCNC: 148 MMOL/L — HIGH (ref 135–145)
SODIUM SERPL-SCNC: 148 MMOL/L — HIGH (ref 135–145)
SPECIMEN SOURCE: SIGNIFICANT CHANGE UP
SPECIMEN SOURCE: SIGNIFICANT CHANGE UP
WBC # BLD: 9.18 K/UL — SIGNIFICANT CHANGE UP (ref 3.8–10.5)
WBC # BLD: 9.18 K/UL — SIGNIFICANT CHANGE UP (ref 3.8–10.5)
WBC # FLD AUTO: 9.18 K/UL — SIGNIFICANT CHANGE UP (ref 3.8–10.5)
WBC # FLD AUTO: 9.18 K/UL — SIGNIFICANT CHANGE UP (ref 3.8–10.5)

## 2023-11-22 PROCEDURE — 99222 1ST HOSP IP/OBS MODERATE 55: CPT

## 2023-11-22 PROCEDURE — 74230 X-RAY XM SWLNG FUNCJ C+: CPT | Mod: 26

## 2023-11-22 PROCEDURE — 99233 SBSQ HOSP IP/OBS HIGH 50: CPT

## 2023-11-22 RX ORDER — MUPIROCIN 20 MG/G
1 OINTMENT TOPICAL
Refills: 0 | Status: DISCONTINUED | OUTPATIENT
Start: 2023-11-22 | End: 2023-11-24

## 2023-11-22 RX ORDER — POTASSIUM CHLORIDE 20 MEQ
10 PACKET (EA) ORAL
Refills: 0 | Status: COMPLETED | OUTPATIENT
Start: 2023-11-22 | End: 2023-11-22

## 2023-11-22 RX ORDER — CEFPODOXIME PROXETIL 100 MG
200 TABLET ORAL EVERY 12 HOURS
Refills: 0 | Status: DISCONTINUED | OUTPATIENT
Start: 2023-11-22 | End: 2023-11-24

## 2023-11-22 RX ORDER — POTASSIUM CHLORIDE 20 MEQ
10 PACKET (EA) ORAL ONCE
Refills: 0 | Status: COMPLETED | OUTPATIENT
Start: 2023-11-22 | End: 2023-11-22

## 2023-11-22 RX ORDER — POTASSIUM CHLORIDE 20 MEQ
40 PACKET (EA) ORAL ONCE
Refills: 0 | Status: COMPLETED | OUTPATIENT
Start: 2023-11-22 | End: 2023-11-22

## 2023-11-22 RX ADMIN — Medication 25 MILLIGRAM(S): at 05:41

## 2023-11-22 RX ADMIN — LATANOPROST 1 DROP(S): 0.05 SOLUTION/ DROPS OPHTHALMIC; TOPICAL at 22:22

## 2023-11-22 RX ADMIN — MUPIROCIN 1 APPLICATION(S): 20 OINTMENT TOPICAL at 17:09

## 2023-11-22 RX ADMIN — GABAPENTIN 400 MILLIGRAM(S): 400 CAPSULE ORAL at 05:41

## 2023-11-22 RX ADMIN — Medication 25 MILLIGRAM(S): at 22:23

## 2023-11-22 RX ADMIN — MODAFINIL 100 MILLIGRAM(S): 200 TABLET ORAL at 12:41

## 2023-11-22 RX ADMIN — GABAPENTIN 400 MILLIGRAM(S): 400 CAPSULE ORAL at 22:23

## 2023-11-22 RX ADMIN — Medication 200 MILLIGRAM(S): at 17:07

## 2023-11-22 RX ADMIN — PIPERACILLIN AND TAZOBACTAM 25 GRAM(S): 4; .5 INJECTION, POWDER, LYOPHILIZED, FOR SOLUTION INTRAVENOUS at 05:40

## 2023-11-22 RX ADMIN — APIXABAN 2.5 MILLIGRAM(S): 2.5 TABLET, FILM COATED ORAL at 05:41

## 2023-11-22 RX ADMIN — Medication 25 MILLIGRAM(S): at 14:55

## 2023-11-22 RX ADMIN — Medication 100 MILLIEQUIVALENT(S): at 14:54

## 2023-11-22 RX ADMIN — Medication 100 MILLIEQUIVALENT(S): at 17:20

## 2023-11-22 RX ADMIN — MIRTAZAPINE 7.5 MILLIGRAM(S): 45 TABLET, ORALLY DISINTEGRATING ORAL at 12:40

## 2023-11-22 RX ADMIN — Medication 1: at 12:42

## 2023-11-22 RX ADMIN — PANTOPRAZOLE SODIUM 40 MILLIGRAM(S): 20 TABLET, DELAYED RELEASE ORAL at 08:47

## 2023-11-22 RX ADMIN — GABAPENTIN 400 MILLIGRAM(S): 400 CAPSULE ORAL at 14:55

## 2023-11-22 RX ADMIN — SENNA PLUS 2 TABLET(S): 8.6 TABLET ORAL at 22:23

## 2023-11-22 RX ADMIN — Medication 240 MILLIGRAM(S): at 05:41

## 2023-11-22 RX ADMIN — Medication 100 MILLIEQUIVALENT(S): at 10:05

## 2023-11-22 RX ADMIN — APIXABAN 2.5 MILLIGRAM(S): 2.5 TABLET, FILM COATED ORAL at 17:09

## 2023-11-22 RX ADMIN — Medication 40 MILLIEQUIVALENT(S): at 10:05

## 2023-11-22 NOTE — PROGRESS NOTE ADULT - SUBJECTIVE AND OBJECTIVE BOX
Date/Time Patient Seen:  		  Referring MD:   Data Reviewed	       Patient is a 73y old  Male who presents with a chief complaint of aspiration PNA (21 Nov 2023 10:18)      Subjective/HPI     PAST MEDICAL & SURGICAL HISTORY:  TBI (traumatic brain injury)    HTN (hypertension)    Atrial fibrillation    DM (diabetes mellitus)    Peripheral neuropathy    Major depression    HLD (hyperlipidemia)    CAD (coronary artery disease)    BPH (benign prostatic hyperplasia)    Pneumonia due to COVID-19 virus  June 2022    Hemorrhage in the brain    No significant past surgical history    No significant past surgical history    H/O craniotomy          Medication list         MEDICATIONS  (STANDING):  apixaban 2.5 milliGRAM(s) Oral every 12 hours  dextrose 5%. 1000 milliLiter(s) (100 mL/Hr) IV Continuous <Continuous>  dextrose 5%. 1000 milliLiter(s) (50 mL/Hr) IV Continuous <Continuous>  dextrose 50% Injectable 25 Gram(s) IV Push once  dextrose 50% Injectable 12.5 Gram(s) IV Push once  dextrose 50% Injectable 25 Gram(s) IV Push once  diltiazem    milliGRAM(s) Oral daily  gabapentin 400 milliGRAM(s) Oral three times a day  glucagon  Injectable 1 milliGRAM(s) IntraMuscular once  hydrALAZINE 25 milliGRAM(s) Oral three times a day  influenza  Vaccine (HIGH DOSE) 0.7 milliLiter(s) IntraMuscular once  insulin lispro (ADMELOG) corrective regimen sliding scale   SubCutaneous three times a day before meals  insulin lispro (ADMELOG) corrective regimen sliding scale   SubCutaneous at bedtime  latanoprost 0.005% Ophthalmic Solution 1 Drop(s) Both EYES at bedtime  mirtazapine 7.5 milliGRAM(s) Oral daily  modafinil 100 milliGRAM(s) Oral daily  pantoprazole    Tablet 40 milliGRAM(s) Oral before breakfast  piperacillin/tazobactam IVPB.. 3.375 Gram(s) IV Intermittent every 8 hours  senna 2 Tablet(s) Oral at bedtime  sodium chloride 0.9%. 1000 milliLiter(s) (75 mL/Hr) IV Continuous <Continuous>    MEDICATIONS  (PRN):  dextrose Oral Gel 15 Gram(s) Oral once PRN Blood Glucose LESS THAN 70 milliGRAM(s)/deciliter         Vitals log        ICU Vital Signs Last 24 Hrs  T(C): 36.8 (21 Nov 2023 20:30), Max: 36.8 (21 Nov 2023 13:59)  T(F): 98.2 (21 Nov 2023 20:30), Max: 98.3 (21 Nov 2023 13:59)  HR: 94 (21 Nov 2023 20:30) (87 - 94)  BP: 130/81 (21 Nov 2023 20:30) (130/81 - 134/83)  BP(mean): --  ABP: --  ABP(mean): --  RR: 17 (21 Nov 2023 20:30) (17 - 17)  SpO2: 96% (21 Nov 2023 20:30) (95% - 96%)    O2 Parameters below as of 21 Nov 2023 13:59  Patient On (Oxygen Delivery Method): room air                 Input and Output:  I&O's Detail    21 Nov 2023 07:01  -  22 Nov 2023 05:05  --------------------------------------------------------  IN:    Oral Fluid: 450 mL  Total IN: 450 mL    OUT:  Total OUT: 0 mL    Total NET: 450 mL          Lab Data                        9.0    6.21  )-----------( 233      ( 21 Nov 2023 06:30 )             27.5     11-21    144  |  113<H>  |  27<H>  ----------------------------<  167<H>  3.7   |  25  |  1.10    Ca    8.3<L>      21 Nov 2023 06:30    TPro  5.6<L>  /  Alb  2.7<L>  /  TBili  0.3  /  DBili  x   /  AST  6<L>  /  ALT  10<L>  /  AlkPhos  70  11-21            Review of Systems	      Objective     Physical Examination    heart s1s2  lung dc BS  head nc      Pertinent Lab findings & Imaging      Geraldo:  NO   Adequate UO     I&O's Detail    21 Nov 2023 07:01  -  22 Nov 2023 05:05  --------------------------------------------------------  IN:    Oral Fluid: 450 mL  Total IN: 450 mL    OUT:  Total OUT: 0 mL    Total NET: 450 mL               Discussed with:     Cultures:	        Radiology

## 2023-11-22 NOTE — PROGRESS NOTE ADULT - ASSESSMENT
72 yo male with PMHx T2DM, peripheral neuropathy, HTN, HLD, A-fib (on Eliquis), TBI (s/p SDH with EVAC on 9/2021), and traumatic subdural hemorrhage in 10/2022 Covid PNA, and major depression, with recent admission at [facility] ICU from 9/25-9/30, presents with worsening cough and decreased mental status. Day of admission the pt had worse coughing with phelgm, febrile    WBC 20.79, Hgb 10.7, lactate 1.9, Procal 0.25, sepsis not likely. Pro BNP 1166, COVID neg. RVP neg.  CT chest, Abd/pelvis:  Right lower lobe pneumonia and small right pleural effusion. No acute intra-abdominal or pelvic findings on unenhanced CT.    RECOMMENDATIONS  1-Aspiration PNA RLL story consistent and pt is at risk so rec  Zosyn (started late 11/29) anticipate 5 days of antibiotics so last day 11/24   but fine to finish course with Vantin 200mg PO BID   -nares MRSA (+)  -urine legionella-neg    Thank you for consulting us and involving us in the management of this most interesting and challenging case.  We will follow along in the care of this patient. Please call us at [phone] or text me directly on my cell# at [phone] with any concerns.    Starting tomorrow Dr Samy Terry will be covering this patient so please contact him with further questions office [phone] or our answering service at [phone]

## 2023-11-22 NOTE — SWALLOW VFSS/MBS ASSESSMENT ADULT - DEMONSTRATES NEED FOR REFERRAL TO ANOTHER SERVICE
to ensure adequate caloric/hydration needs are met given the severity of pt's dysphagia described herein/Registered Dietitian

## 2023-11-22 NOTE — PROGRESS NOTE ADULT - ASSESSMENT
72 yo male with PMHx T2DM, peripheral neuropathy, HTN, HLD, A-fib (on Eliquis), TBI (s/p SDH with EVAC on 9/2021), and traumatic subdural hemorrhage in 10/2022 Covid PNA, and major depression, with recent admission at [facility] ICU from 9/25-9/30, presents with worsening cough and decreased mental status.  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 Metformin was discontinued at this time.    pna  ams  frailty  weakness  DM  neuropathy  HLD  HTN  TBI hx   SDH hx  Depression    PO diet  on ABX  ID eval noted  vs noted  MRSA screen Pos    SLP eval done  emp ABX  HOB elev  asp prec  oral hygiene  assist with needs  monitor mentation  DM care  serial FS  old records reviewed  TTE reviewed - grossly int  prognosis guarded  GOC - pt is DNR

## 2023-11-22 NOTE — CONSULT NOTE ADULT - SUBJECTIVE AND OBJECTIVE BOX
Patient is a 73y old  Male who presents with a chief complaint of aspiration PNA (22 Nov 2023 12:42)      HPI:  72 yo male with PMHx T2DM, peripheral neuropathy, HTN, HLD, A-fib (on Eliquis), TBI (s/p SDH with EVAC on 9/2021), and traumatic subdural hemorrhage in 10/2022 Covid PNA, and major depression, with recent admission at [facility] ICU from 9/25-9/30, presents with worsening cough and decreased mental status.  He was previously seen at [facility] ICU for severe metformin-induced lactic acidosis and sepsis 2/2 retrosigmoid colitis and suspected aspiration pneumonia. Metformin was discontinued at this time.  Per wife, since discharge home the pt has been doing well. He has been his normal self, at baseline patient is alert and oriented to person occasionally will answer some questions. He has been eating well. Wife tries to give him soft and bite sized food, and he coughs when he drinks water. Today the pt had worse coughing with phelgm, and the wife thought that he felt hot to the touch. She gave him Robitussin. This afternoon, he began to be less responsive than normal, so she brought him to the ED. Denies N/V/D, constipation, hematuria, rash, CP, abd pain.  ED COURSE:  Vitals: T  98 F , HR 80 , BP  156/84 , RR 18 , SpO2  95% on RA  Labs significant for: WBC 20.79, Hgb 10.7, lactate 1.9, Procal 0.25, sepsis not likely. Pro BNP 1166, COVID neg. RVP neg.  Imaging: CT head: negative  CT chest, Abd/pelvis:  Right lower lobe pneumonia and small right pleural effusion. No acute intra-abdominal or pelvic findings on unenhanced CT.  EKG: AFib, HR 88, Right superior axis deviation, incomplete RBBB  Pt received: Zosyn, solumedrol 125 mg, duoneb, 2 L NS bolus (20 Nov 2023 03:04)      PAST MEDICAL & SURGICAL HISTORY:  TBI (traumatic brain injury)      HTN (hypertension)      Atrial fibrillation      Peripheral neuropathy      Major depression      HLD (hyperlipidemia)      CAD (coronary artery disease)      BPH (benign prostatic hyperplasia)      Pneumonia due to COVID-19 virus  June 2022      Hemorrhage in the brain      H/O craniotomy                ECHO 08/2023  FINDINGS: EF 60-65%, Trace MR, TR      MEDICATIONS  (STANDING):  apixaban 2.5 milliGRAM(s) Oral every 12 hours  dextrose 5%. 1000 milliLiter(s) (100 mL/Hr) IV Continuous <Continuous>  dextrose 5%. 1000 milliLiter(s) (50 mL/Hr) IV Continuous <Continuous>  dextrose 50% Injectable 25 Gram(s) IV Push once  dextrose 50% Injectable 12.5 Gram(s) IV Push once  dextrose 50% Injectable 25 Gram(s) IV Push once  diltiazem    milliGRAM(s) Oral daily  gabapentin 400 milliGRAM(s) Oral three times a day  glucagon  Injectable 1 milliGRAM(s) IntraMuscular once  hydrALAZINE 25 milliGRAM(s) Oral three times a day  influenza  Vaccine (HIGH DOSE) 0.7 milliLiter(s) IntraMuscular once  insulin lispro (ADMELOG) corrective regimen sliding scale   SubCutaneous three times a day before meals  insulin lispro (ADMELOG) corrective regimen sliding scale   SubCutaneous at bedtime  latanoprost 0.005% Ophthalmic Solution 1 Drop(s) Both EYES at bedtime  mirtazapine 7.5 milliGRAM(s) Oral daily  modafinil 100 milliGRAM(s) Oral daily  mupirocin 2% Nasal 1 Application(s) Both Nostrils two times a day  pantoprazole    Tablet 40 milliGRAM(s) Oral before breakfast  piperacillin/tazobactam IVPB.. 3.375 Gram(s) IV Intermittent every 8 hours  potassium chloride  10 mEq/100 mL IVPB 10 milliEquivalent(s) IV Intermittent every 1 hour  senna 2 Tablet(s) Oral at bedtime  sodium chloride 0.9%. 1000 milliLiter(s) (75 mL/Hr) IV Continuous <Continuous>    MEDICATIONS  (PRN):  dextrose Oral Gel 15 Gram(s) Oral once PRN Blood Glucose LESS THAN 70 milliGRAM(s)/deciliter      FAMILY HISTORY:  FH: HTN (hypertension) (Father, Mother, Sibling)    Family history of bone cancer (Sibling)    FH: Alzheimers disease (Sibling)      Denies Family history of CAD or early MI    ROS: Unable to obtain as patient is non verbal      SOCIAL HISTORY:    No tobacco, Alcohol or Ddrug use    Vital Signs Last 24 Hrs  T(C): 36.9 (22 Nov 2023 04:58), Max: 36.9 (22 Nov 2023 04:58)  T(F): 98.5 (22 Nov 2023 04:58), Max: 98.5 (22 Nov 2023 04:58)  HR: 79 (22 Nov 2023 04:58) (79 - 94)  BP: 115/63 (22 Nov 2023 04:58) (115/63 - 134/83)  BP(mean): --  RR: 17 (22 Nov 2023 04:58) (17 - 17)  SpO2: 99% (22 Nov 2023 04:58) (95% - 99%)    Parameters below as of 22 Nov 2023 04:58  Patient On (Oxygen Delivery Method): room air        Physical Exam:  General: Well developed, well nourished, NAD  HEENT: NCAT, PERRLA, EOMI bl, moist mucous membranes   Neck: Supple, nontender, no mass  Neurology: Non verbal   Respiratory: CTA B/L, No W/R/R  CV: RRR, +S1/S2, no murmurs, rubs or gallops  Abdominal: Soft, NT, ND +BSx4, no palpable masses  Extremities: No C/C/E, + peripheral pulses  MSK: Normal ROM, no joint erythema or warmth, no joint swelling   Heme: No obvious ecchymosis or petechiae   Skin: warm, dry, normal color      ECG 11/19/23: 88bpm, Afib, RBBB, RVH     I&O's Detail    21 Nov 2023 07:01  -  22 Nov 2023 07:00  --------------------------------------------------------  IN:    Oral Fluid: 450 mL  Total IN: 450 mL    OUT:    Voided (mL): 1500 mL  Total OUT: 1500 mL    Total NET: -1050 mL          LABS:                        9.0    9.18  )-----------( 212      ( 22 Nov 2023 08:15 )             28.0     11-22    148<H>  |  114<H>  |  26<H>  ----------------------------<  124<H>  3.3<L>   |  26  |  0.95    Ca    8.2<L>      22 Nov 2023 08:15  Mg     2.0     11-22    TPro  5.4<L>  /  Alb  2.7<L>  /  TBili  0.4  /  DBili  x   /  AST  8<L>  /  ALT  10<L>  /  AlkPhos  62  11-22          Urinalysis Basic - ( 22 Nov 2023 08:15 )    Color: x / Appearance: x / SG: x / pH: x  Gluc: 124 mg/dL / Ketone: x  / Bili: x / Urobili: x   Blood: x / Protein: x / Nitrite: x   Leuk Esterase: x / RBC: x / WBC x   Sq Epi: x / Non Sq Epi: x / Bacteria: x      I&O's Summary    21 Nov 2023 07:01  -  22 Nov 2023 07:00  --------------------------------------------------------  IN: 450 mL / OUT: 1500 mL / NET: -1050 mL      BNP 1166         ASSESSMENT/PLAN   Patient is a 72 yo male with PMHx T2DM, peripheral neuropathy, HTN, HLD, A-fib (on Eliquis), TBI (s/p SDH with EVAC on 9/2021), and traumatic subdural hemorrhage in 10/2022 Covid PNA, and major depression, with recent admission at [facility] ICU from 9/25-9/30 admitted for RLL PNA management. Patient has been on tele monitor and recently noted to have 8 beats of V tach. This was the first time this happed this admission. Patient is non verbal, so unable to assess ROS, but upon discussion with his nurse, patient has not been any distress.       #Arrhythmia  - Patient no acute distress  - Hemodynamically stable   - ECG 11/19/23: 88bpm, Afib, RBBB, RVH   - 8 episodes of V tach noted on tele   - Patient is rate controlled on current regimen   - Continue home Cardizem 240mg daily for rate control  - Continue to manage conservatively --> if continues to have episodes of V tach, consider adding Metoprolol   - Monitor and replete lytes, keep K>4, Mg>2.  - Continue to monitor on tele       - Other cardiovascular workup will depend on clinical course.  - All other workup per primary team.  - Will continue to follow.           Patient is a 73y old  Male who presents with a chief complaint of aspiration PNA (22 Nov 2023 12:42)      HPI:  72 yo male with PMHx T2DM, peripheral neuropathy, HTN, HLD, A-fib (on Eliquis), TBI (s/p SDH with EVAC on 9/2021), and traumatic subdural hemorrhage in 10/2022 Covid PNA, and major depression, with recent admission at [facility] ICU from 9/25-9/30, presents with worsening cough and decreased mental status.  He was previously seen at [facility] ICU for severe metformin-induced lactic acidosis and sepsis 2/2 retrosigmoid colitis and suspected aspiration pneumonia. Metformin was discontinued at this time.  Per wife, since discharge home the pt has been doing well. He has been his normal self, at baseline patient is alert and oriented to person occasionally will answer some questions. He has been eating well. Wife tries to give him soft and bite sized food, and he coughs when he drinks water. Today the pt had worse coughing with phelgm, and the wife thought that he felt hot to the touch. She gave him Robitussin. This afternoon, he began to be less responsive than normal, so she brought him to the ED. Denies N/V/D, constipation, hematuria, rash, CP, abd pain.  ED COURSE:  Vitals: T  98 F , HR 80 , BP  156/84 , RR 18 , SpO2  95% on RA  Labs significant for: WBC 20.79, Hgb 10.7, lactate 1.9, Procal 0.25, sepsis not likely. Pro BNP 1166, COVID neg. RVP neg.  Imaging: CT head: negative  CT chest, Abd/pelvis:  Right lower lobe pneumonia and small right pleural effusion. No acute intra-abdominal or pelvic findings on unenhanced CT.  EKG: AFib, HR 88, Right superior axis deviation, incomplete RBBB  Pt received: Zosyn, solumedrol 125 mg, duoneb, 2 L NS bolus (20 Nov 2023 03:04)      PAST MEDICAL & SURGICAL HISTORY:  TBI (traumatic brain injury)      HTN (hypertension)      Atrial fibrillation      Peripheral neuropathy      Major depression      HLD (hyperlipidemia)      CAD (coronary artery disease)      BPH (benign prostatic hyperplasia)      Pneumonia due to COVID-19 virus  June 2022      Hemorrhage in the brain      H/O craniotomy                ECHO 08/2023  FINDINGS: EF 60-65%, Trace MR, TR      MEDICATIONS  (STANDING):  apixaban 2.5 milliGRAM(s) Oral every 12 hours  dextrose 5%. 1000 milliLiter(s) (100 mL/Hr) IV Continuous <Continuous>  dextrose 5%. 1000 milliLiter(s) (50 mL/Hr) IV Continuous <Continuous>  dextrose 50% Injectable 25 Gram(s) IV Push once  dextrose 50% Injectable 12.5 Gram(s) IV Push once  dextrose 50% Injectable 25 Gram(s) IV Push once  diltiazem    milliGRAM(s) Oral daily  gabapentin 400 milliGRAM(s) Oral three times a day  glucagon  Injectable 1 milliGRAM(s) IntraMuscular once  hydrALAZINE 25 milliGRAM(s) Oral three times a day  influenza  Vaccine (HIGH DOSE) 0.7 milliLiter(s) IntraMuscular once  insulin lispro (ADMELOG) corrective regimen sliding scale   SubCutaneous three times a day before meals  insulin lispro (ADMELOG) corrective regimen sliding scale   SubCutaneous at bedtime  latanoprost 0.005% Ophthalmic Solution 1 Drop(s) Both EYES at bedtime  mirtazapine 7.5 milliGRAM(s) Oral daily  modafinil 100 milliGRAM(s) Oral daily  mupirocin 2% Nasal 1 Application(s) Both Nostrils two times a day  pantoprazole    Tablet 40 milliGRAM(s) Oral before breakfast  piperacillin/tazobactam IVPB.. 3.375 Gram(s) IV Intermittent every 8 hours  potassium chloride  10 mEq/100 mL IVPB 10 milliEquivalent(s) IV Intermittent every 1 hour  senna 2 Tablet(s) Oral at bedtime  sodium chloride 0.9%. 1000 milliLiter(s) (75 mL/Hr) IV Continuous <Continuous>    MEDICATIONS  (PRN):  dextrose Oral Gel 15 Gram(s) Oral once PRN Blood Glucose LESS THAN 70 milliGRAM(s)/deciliter      FAMILY HISTORY:  FH: HTN (hypertension) (Father, Mother, Sibling)    Family history of bone cancer (Sibling)    FH: Alzheimers disease (Sibling)      Denies Family history of CAD or early MI    ROS: Unable to obtain as patient is non verbal      SOCIAL HISTORY:    No tobacco, Alcohol or Ddrug use    Vital Signs Last 24 Hrs  T(C): 36.9 (22 Nov 2023 04:58), Max: 36.9 (22 Nov 2023 04:58)  T(F): 98.5 (22 Nov 2023 04:58), Max: 98.5 (22 Nov 2023 04:58)  HR: 79 (22 Nov 2023 04:58) (79 - 94)  BP: 115/63 (22 Nov 2023 04:58) (115/63 - 134/83)  BP(mean): --  RR: 17 (22 Nov 2023 04:58) (17 - 17)  SpO2: 99% (22 Nov 2023 04:58) (95% - 99%)    Parameters below as of 22 Nov 2023 04:58  Patient On (Oxygen Delivery Method): room air        Physical Exam:  General: Well developed, well nourished, NAD  HEENT: NCAT, PERRLA, EOMI bl, moist mucous membranes   Neck: Supple, nontender, no mass  Neurology: Non verbal   Respiratory: CTA B/L, No W/R/R  CV: RRR, +S1/S2, no murmurs, rubs or gallops  Abdominal: Soft, NT, ND +BSx4, no palpable masses  Extremities: No C/C/E, + peripheral pulses  MSK: Normal ROM, no joint erythema or warmth, no joint swelling   Heme: No obvious ecchymosis or petechiae   Skin: warm, dry, normal color      ECG 11/19/23: 88bpm, Afib, RBBB, RVH     I&O's Detail    21 Nov 2023 07:01  -  22 Nov 2023 07:00  --------------------------------------------------------  IN:    Oral Fluid: 450 mL  Total IN: 450 mL    OUT:    Voided (mL): 1500 mL  Total OUT: 1500 mL    Total NET: -1050 mL          LABS:                        9.0    9.18  )-----------( 212      ( 22 Nov 2023 08:15 )             28.0     11-22    148<H>  |  114<H>  |  26<H>  ----------------------------<  124<H>  3.3<L>   |  26  |  0.95    Ca    8.2<L>      22 Nov 2023 08:15  Mg     2.0     11-22    TPro  5.4<L>  /  Alb  2.7<L>  /  TBili  0.4  /  DBili  x   /  AST  8<L>  /  ALT  10<L>  /  AlkPhos  62  11-22          Urinalysis Basic - ( 22 Nov 2023 08:15 )    Color: x / Appearance: x / SG: x / pH: x  Gluc: 124 mg/dL / Ketone: x  / Bili: x / Urobili: x   Blood: x / Protein: x / Nitrite: x   Leuk Esterase: x / RBC: x / WBC x   Sq Epi: x / Non Sq Epi: x / Bacteria: x      I&O's Summary    21 Nov 2023 07:01  -  22 Nov 2023 07:00  --------------------------------------------------------  IN: 450 mL / OUT: 1500 mL / NET: -1050 mL      BNP 1166

## 2023-11-22 NOTE — SWALLOW VFSS/MBS ASSESSMENT ADULT - COMMENTS
Per charting, "74 yo male with PMHx T2DM, peripheral neuropathy, HTN, HLD, A-fib (on Eliquis), TBI (s/p SDH with EVAC on 9/2021), and traumatic subdural hemorrhage in 10/2022 Covid PNA, and major depression, with recent admission at [facility] ICU from 9/25-9/30, presents with worsening cough and decreased mental status. Found to have RLL PNA likely aspiration."    CT Chest 11/19/23 "IMPRESSION: Right lower lobe pneumonia and small right pleural effusion."    Pt is familiar to this service from this current admission as well as prior admissions (See chart for full reports). Pt was received awake and alert in radiology suite this AM. Pt with no verbal output, responding to yes/no questions via head nod. Pt inconsistently follows simple one-step directives. Per charting, "72 yo male with PMHx T2DM, peripheral neuropathy, HTN, HLD, A-fib (on Eliquis), TBI (s/p SDH with EVAC on 9/2021), and traumatic subdural hemorrhage in 10/2022 Covid PNA, and major depression, with recent admission [facility] ICU from 9/25-9/30, presents with worsening cough and decreased mental status. Found to have RLL PNA likely aspiration."    CT Chest 11/19/23 "IMPRESSION: Right lower lobe pneumonia and small right pleural effusion."    Pt is familiar to this service from this current admission as well as prior admissions (See chart for full reports). Pt was received awake and alert in radiology suite this AM. Pt with no verbal output, responding to yes/no questions via head nod. Pt inconsistently follows simple one-step directives. Recommendations communicated to Dr. Pruitt via TEAMS.

## 2023-11-22 NOTE — PROGRESS NOTE ADULT - SUBJECTIVE AND OBJECTIVE BOX
Patient is a 73y old  Male who presents with a chief complaint of aspiration PNA (22 Nov 2023 05:05)       INTERVAL HPI/OVERNIGHT EVENTS: Patient seen and examined at bedside. Non verbal. No overnight events noted     MEDICATIONS  (STANDING):  apixaban 2.5 milliGRAM(s) Oral every 12 hours  dextrose 5%. 1000 milliLiter(s) (50 mL/Hr) IV Continuous <Continuous>  dextrose 5%. 1000 milliLiter(s) (100 mL/Hr) IV Continuous <Continuous>  dextrose 50% Injectable 25 Gram(s) IV Push once  dextrose 50% Injectable 12.5 Gram(s) IV Push once  dextrose 50% Injectable 25 Gram(s) IV Push once  diltiazem    milliGRAM(s) Oral daily  gabapentin 400 milliGRAM(s) Oral three times a day  glucagon  Injectable 1 milliGRAM(s) IntraMuscular once  hydrALAZINE 25 milliGRAM(s) Oral three times a day  influenza  Vaccine (HIGH DOSE) 0.7 milliLiter(s) IntraMuscular once  insulin lispro (ADMELOG) corrective regimen sliding scale   SubCutaneous three times a day before meals  insulin lispro (ADMELOG) corrective regimen sliding scale   SubCutaneous at bedtime  latanoprost 0.005% Ophthalmic Solution 1 Drop(s) Both EYES at bedtime  mirtazapine 7.5 milliGRAM(s) Oral daily  modafinil 100 milliGRAM(s) Oral daily  pantoprazole    Tablet 40 milliGRAM(s) Oral before breakfast  piperacillin/tazobactam IVPB.. 3.375 Gram(s) IV Intermittent every 8 hours  senna 2 Tablet(s) Oral at bedtime  sodium chloride 0.9%. 1000 milliLiter(s) (75 mL/Hr) IV Continuous <Continuous>    MEDICATIONS  (PRN):  dextrose Oral Gel 15 Gram(s) Oral once PRN Blood Glucose LESS THAN 70 milliGRAM(s)/deciliter      Allergies    No Known Allergies    Intolerances        REVIEW OF SYSTEMS:  Unable to obtain, due  to mental status   Vital Signs Last 24 Hrs  T(C): 36.9 (22 Nov 2023 04:58), Max: 36.9 (22 Nov 2023 04:58)  T(F): 98.5 (22 Nov 2023 04:58), Max: 98.5 (22 Nov 2023 04:58)  HR: 79 (22 Nov 2023 04:58) (79 - 94)  BP: 115/63 (22 Nov 2023 04:58) (115/63 - 134/83)  BP(mean): --  RR: 17 (22 Nov 2023 04:58) (17 - 17)  SpO2: 99% (22 Nov 2023 04:58) (95% - 99%)    Parameters below as of 22 Nov 2023 04:58  Patient On (Oxygen Delivery Method): room air        PHYSICAL EXAM:  GENERAL: NAD, lying in bed comfortably.  EYES: EOMI,  conjunctiva and sclera clear  ENT: Moist mucous membranes  NECK: Supple, No JVD  CHEST/LUNG: transmitted upper airway sounds, no w/w/r, no accessory muscle use. on Room air  HEART: S1S2+, Regular rate and rhythm  ABDOMEN: Bowel sounds present; Soft, Nontender, Nondistended. No hepatomegaly  EXTREMITIES:  2+ Peripheral Pulses, brisk capillary refill. No clubbing, cyanosis  NERVOUS SYSTEM:  Awake, responsive, calm , cooperative  MSK: contracted extremities   SKIN: Warm dry, scattered dry skin lesions     LABS:      Ca    8.3        21 Nov 2023 06:30        CAPILLARY BLOOD GLUCOSE      POCT Blood Glucose.: 139 mg/dL (22 Nov 2023 07:33)  POCT Blood Glucose.: 190 mg/dL (21 Nov 2023 21:04)  POCT Blood Glucose.: 150 mg/dL (21 Nov 2023 16:47)  POCT Blood Glucose.: 220 mg/dL (21 Nov 2023 11:53)    BLOOD CULTURE  11-19 @ 21:25   No growth at 48 Hours  --  --  11-19 @ 21:20   No growth at 48 Hours  --  --    RADIOLOGY & ADDITIONAL TESTS:    Imaging Personally Reviewed:  [ ] YES     Consultant(s) Notes Reviewed:      Care Discussed with Consultants/Other Providers:

## 2023-11-22 NOTE — PHARMACOTHERAPY INTERVENTION NOTE - COMMENTS
Pt is a 74 yo male with past medical history of T2DM, HTN, HLD, afib (on Eliquis), TBI (Sept 2021), and traumatic subdural hemorrhage (Oct 2022). Pt currently ordered for Eliquis 2.5 mg twice daily and does not meet 2/3 criteria for dose reduction (age > 80, weight < 60 kg, or SCr > 1.5). Discussed with Dr. Payne and would like to hold off on dose adjustment for now per clinical judgement.
Pt is a 74 yo male admitted for RLL aspiration pneumonia, currently ordered for zosyn 3.375 g Q8H. Pt is clinically improving and ID recommends switch to cefpodoxime 200 mg BID until 11/24. Discussed with Dr. Pruitt and recommended switch from IV to PO starting today. Recommendation was accepted and order was entered.

## 2023-11-22 NOTE — PROGRESS NOTE ADULT - SUBJECTIVE AND OBJECTIVE BOX
[facility] DIVISION of INFECTIOUS DISEASE  Ovidio Manning MD PhD, Jojo Boggs MD, Hafsa Bhardwaj MD, Danica Goldsmith MD, Samy Terry MD  and providing coverage with Meet Perea MD  Providing Infectious Disease Consultations at [facility], [facility], [facility]'s    Office# [phone] to schedule follow up appointments  Answering Service for urgent calls or New Consults [phone]  Cell# to text for urgent issues Ovidio Manning [phone]     infectious diseases progress note:    MELBA PARMAR is a 73y y. o. Male patient    Overnight and events of the last 24hrs reviewed    Allergies    No Known Allergies    Intolerances        ANTIBIOTICS/RELEVANT:  antimicrobials  piperacillin/tazobactam IVPB.. 3.375 Gram(s) IV Intermittent every 8 hours    immunologic:  influenza  Vaccine (HIGH DOSE) 0.7 milliLiter(s) IntraMuscular once    OTHER:  apixaban 2.5 milliGRAM(s) Oral every 12 hours  dextrose 5%. 1000 milliLiter(s) IV Continuous <Continuous>  dextrose 5%. 1000 milliLiter(s) IV Continuous <Continuous>  dextrose 50% Injectable 25 Gram(s) IV Push once  dextrose 50% Injectable 12.5 Gram(s) IV Push once  dextrose 50% Injectable 25 Gram(s) IV Push once  dextrose Oral Gel 15 Gram(s) Oral once PRN  diltiazem    milliGRAM(s) Oral daily  gabapentin 400 milliGRAM(s) Oral three times a day  glucagon  Injectable 1 milliGRAM(s) IntraMuscular once  hydrALAZINE 25 milliGRAM(s) Oral three times a day  insulin lispro (ADMELOG) corrective regimen sliding scale   SubCutaneous three times a day before meals  insulin lispro (ADMELOG) corrective regimen sliding scale   SubCutaneous at bedtime  latanoprost 0.005% Ophthalmic Solution 1 Drop(s) Both EYES at bedtime  mirtazapine 7.5 milliGRAM(s) Oral daily  modafinil 100 milliGRAM(s) Oral daily  mupirocin 2% Nasal 1 Application(s) Both Nostrils two times a day  pantoprazole    Tablet 40 milliGRAM(s) Oral before breakfast  potassium chloride  10 mEq/100 mL IVPB 10 milliEquivalent(s) IV Intermittent every 1 hour  senna 2 Tablet(s) Oral at bedtime  sodium chloride 0.9%. 1000 milliLiter(s) IV Continuous <Continuous>      Objective:  Vital Signs Last 24 Hrs  T(C): 36.9 (22 Nov 2023 04:58), Max: 36.9 (22 Nov 2023 04:58)  T(F): 98.5 (22 Nov 2023 04:58), Max: 98.5 (22 Nov 2023 04:58)  HR: 79 (22 Nov 2023 04:58) (79 - 94)  BP: 115/63 (22 Nov 2023 04:58) (115/63 - 134/83)  BP(mean): --  RR: 17 (22 Nov 2023 04:58) (17 - 17)  SpO2: 99% (22 Nov 2023 04:58) (95% - 99%)    Parameters below as of 22 Nov 2023 04:58  Patient On (Oxygen Delivery Method): room air        T(C): 36.9 (11-22-23 @ 04:58), Max: 36.9 (11-22-23 @ 04:58)  T(C): 36.9 (11-22-23 @ 04:58), Max: 36.9 (11-22-23 @ 04:58)  T(C): 36.9 (11-22-23 @ 04:58), Max: 36.9 (11-22-23 @ 04:58)    PHYSICAL EXAM:  HEENT: NC atraumatic  Neck: supple  Respiratory: no accessory muscle use, breathing comfortably, CTA  Cardiovascular: distant  Gastrointestinal: normal appearing, nondistended  Extremities: no clubbing, no cyanosis,  Neuro: alert, contracted      LABS:                          9.0    9.18  )-----------( 212      ( 22 Nov 2023 08:15 )             28.0       WBC  9.18 11-22 @ 08:15  6.21 11-21 @ 06:30  12.04 11-20 @ 05:20  20.79 11-19 @ 21:02      11-22    148<H>  |  114<H>  |  26<H>  ----------------------------<  124<H>  3.3<L>   |  26  |  0.95    Ca    8.2<L>      22 Nov 2023 08:15  Mg     2.0     11-22    TPro  5.4<L>  /  Alb  2.7<L>  /  TBili  0.4  /  DBili  x   /  AST  8<L>  /  ALT  10<L>  /  AlkPhos  62  11-22      Creatinine: 0.95 mg/dL (11-22-23 @ 08:15)  Creatinine: 1.10 mg/dL (11-21-23 @ 06:30)  Creatinine: 0.96 mg/dL (11-20-23 @ 05:20)  Creatinine: 1.00 mg/dL (11-19-23 @ 21:02)        Urinalysis Basic - ( 22 Nov 2023 08:15 )    Color: x / Appearance: x / SG: x / pH: x  Gluc: 124 mg/dL / Ketone: x  / Bili: x / Urobili: x   Blood: x / Protein: x / Nitrite: x   Leuk Esterase: x / RBC: x / WBC x   Sq Epi: x / Non Sq Epi: x / Bacteria: x            INFLAMMATORY MARKERS      MICROBIOLOGY:              RADIOLOGY & ADDITIONAL STUDIES:

## 2023-11-22 NOTE — CONSULT NOTE ADULT - ATTENDING COMMENTS
Patient is a 74 yo male with PMHx T2DM, peripheral neuropathy, HTN, HLD, A-fib (on Eliquis), TBI (s/p SDH with EVAC on 9/2021), and traumatic subdural hemorrhage in 10/2022 Covid PNA, and major depression, with recent admission at [facility] ICU from 9/25-9/30 admitted for RLL PNA management. Patient has been on tele monitor and recently noted to have 8 beats of V tach. This was the first time this happed this admission. Patient is non verbal, so unable to assess ROS, but upon discussion with his nurse, patient has not been any distress.     unable to provide a hx on the basis of tbi  recently admitted with pna  rate controlled af  now with 8 beats wct  normal ef on recent echo  can cont to monitor  vt prob on the basis of electrolyte abn and underlying clinical condition  cont bb and cont tele  if vt recurs will add bb and down titrate ccb

## 2023-11-22 NOTE — SWALLOW VFSS/MBS ASSESSMENT ADULT - DIAGNOSTIC IMPRESSIONS
Patient demonstrates a moderate oropharyngeal dysphagia judged to be chronic given history of TBI and likely exacerbated by a cognitive-behavioral component. Oral stage is marked by adequate labial stripping of bolus from utensil, mildly prolonged/disorganized mastication of solids, and prolonged anterior-posterior transport across all textures. There is significant oral holding of thin liquids with poor/absent response to clinician cues to initiate pharyngeal trigger, with pt ultimately relying on removal of bolus from oral cavity via Yankauer suction. Pharyngeal trigger is otherwise timely for puree, solids, moderately-thick and mildly-thick liquids. There is reduced base of tongue retraction, reduced hyolaryngeal elevation/excursion, reduced epiglottic retroflexion and reduced pharyngeal contractility. Deficits result in mild to moderate residue in/along the valleculae, lateral pharyngeal walls and pyriform sinuses post swallow for puree and regular solids. There is incomplete closure of the laryngeal vestibule with laryngeal penetration during and after the swallow with migration to the vocal folds (PAS 5) for mildly-thick liquids due to spillover from the pyriforms. Pt with absent cough response indicating impaired laryngopharyngeal sensation. There is trace laryngeal penetration during the swallow with full retrieval (PAS 2) for regular solids. There is no evidence of laryngeal penetration/aspiration for puree and moderately-thick liquids. Pt benefits from cued secondary swallows and liquid wash to reduce pharyngeal stasis. No additional compensatory strategies were trialed as pt does not consistently follow one-step directives.

## 2023-11-22 NOTE — PROGRESS NOTE ADULT - ASSESSMENT
72 yo male with PMHx T2DM, peripheral neuropathy, HTN, HLD, A-fib (on Eliquis), TBI (s/p SDH with EVAC on 9/2021), and traumatic subdural hemorrhage in 10/2022 Covid PNA, and major depression, with recent admission at [facility] ICU from 9/25-9/30, presents with worsening cough and decreased mental status. Found to have RLL PNA likely aspiration.

## 2023-11-22 NOTE — SWALLOW VFSS/MBS ASSESSMENT ADULT - PHARYNGEAL PHASE COMMENTS
Reduced base of tongue retraction; Reduced hyolaryngeal elevation/excursion; Reduced pharyngeal contractility; Reduced opening of the pharyngoesophageal segment Reduced base of tongue retraction; Reduced hyolaryngeal elevation/excursion; Reduced pharyngeal contractility; Reduced epiglottic retroflexion; Reduced opening of the pharyngoesophageal segment unable to adequately assess pt ultimately required bolus to be suctioned from oral cavity

## 2023-11-22 NOTE — CONSULT NOTE ADULT - ASSESSMENT
Patient is a 72 yo male with PMHx T2DM, peripheral neuropathy, HTN, HLD, A-fib (on Eliquis), TBI (s/p SDH with EVAC on 9/2021), and traumatic subdural hemorrhage in 10/2022 Covid PNA, and major depression, with recent admission at [facility] ICU from 9/25-9/30 admitted for RLL PNA management. Patient has been on tele monitor and recently noted to have 8 beats of V tach. This was the first time this happed this admission. Patient is non verbal, so unable to assess ROS, but upon discussion with his nurse, patient has not been any distress.       #Arrhythmia  - Patient no acute distress  - Hemodynamically stable   - ECG 11/19/23: 88bpm, Afib, RBBB, RVH   - 8 episodes of V tach noted on tele   - Patient is rate controlled on current regimen   - Continue home Cardizem 240mg daily for rate control  - Continue to manage conservatively --> if continues to have episodes of V tach, consider adding Metoprolol   - Monitor and replete lytes, keep K>4, Mg>2.  - Continue to monitor on tele       - Other cardiovascular workup will depend on clinical course.  - All other workup per primary team.  - Will continue to follow. Patient is a 74 yo male with PMHx T2DM, peripheral neuropathy, HTN, HLD, A-fib (on Eliquis), TBI (s/p SDH with EVAC on 9/2021), and traumatic subdural hemorrhage in 10/2022 Covid PNA, and major depression, with [facility]9/30 admitted for RLL PNA management. Patient has been on tele monitor and recently noted to have 8 beats of V tach. This was the first time this happed this admission. Patient is non verbal, so unable to assess ROS, but upon discussion with his nurse, patient has not been any distress.       #Arrhythmia  - Patient no acute distress  - Hemodynamically stable   - ECG 11/19/23: 88bpm, Afib, RBBB, RVH   - 8 beats of V tach noted on tele   - Patient is rate controlled in af on current regimen and ef is normal  - Continue home Cardizem 240mg daily for rate control  - Continue to manage conservatively --> if continues to have episodes of V tach, consider adding Metoprolol   - Monitor and replete lytes, keep K>4, Mg>2.  - Continue to monitor on tele       - Other cardiovascular workup will depend on clinical course.  - All other workup per primary team.  - Will continue to follow.

## 2023-11-23 LAB
ALBUMIN SERPL ELPH-MCNC: 2.7 G/DL — LOW (ref 3.3–5)
ALBUMIN SERPL ELPH-MCNC: 2.7 G/DL — LOW (ref 3.3–5)
ALP SERPL-CCNC: 66 U/L — SIGNIFICANT CHANGE UP (ref 40–120)
ALP SERPL-CCNC: 66 U/L — SIGNIFICANT CHANGE UP (ref 40–120)
ALT FLD-CCNC: 19 U/L — SIGNIFICANT CHANGE UP (ref 12–78)
ALT FLD-CCNC: 19 U/L — SIGNIFICANT CHANGE UP (ref 12–78)
ANION GAP SERPL CALC-SCNC: 7 MMOL/L — SIGNIFICANT CHANGE UP (ref 5–17)
ANION GAP SERPL CALC-SCNC: 7 MMOL/L — SIGNIFICANT CHANGE UP (ref 5–17)
AST SERPL-CCNC: 13 U/L — LOW (ref 15–37)
AST SERPL-CCNC: 13 U/L — LOW (ref 15–37)
BASOPHILS # BLD AUTO: 0.04 K/UL — SIGNIFICANT CHANGE UP (ref 0–0.2)
BASOPHILS # BLD AUTO: 0.04 K/UL — SIGNIFICANT CHANGE UP (ref 0–0.2)
BASOPHILS NFR BLD AUTO: 0.5 % — SIGNIFICANT CHANGE UP (ref 0–2)
BASOPHILS NFR BLD AUTO: 0.5 % — SIGNIFICANT CHANGE UP (ref 0–2)
BILIRUB SERPL-MCNC: 0.4 MG/DL — SIGNIFICANT CHANGE UP (ref 0.2–1.2)
BILIRUB SERPL-MCNC: 0.4 MG/DL — SIGNIFICANT CHANGE UP (ref 0.2–1.2)
BUN SERPL-MCNC: 24 MG/DL — HIGH (ref 7–23)
BUN SERPL-MCNC: 24 MG/DL — HIGH (ref 7–23)
CALCIUM SERPL-MCNC: 8.3 MG/DL — LOW (ref 8.5–10.1)
CALCIUM SERPL-MCNC: 8.3 MG/DL — LOW (ref 8.5–10.1)
CHLORIDE SERPL-SCNC: 112 MMOL/L — HIGH (ref 96–108)
CHLORIDE SERPL-SCNC: 112 MMOL/L — HIGH (ref 96–108)
CO2 SERPL-SCNC: 28 MMOL/L — SIGNIFICANT CHANGE UP (ref 22–31)
CO2 SERPL-SCNC: 28 MMOL/L — SIGNIFICANT CHANGE UP (ref 22–31)
CREAT SERPL-MCNC: 0.97 MG/DL — SIGNIFICANT CHANGE UP (ref 0.5–1.3)
CREAT SERPL-MCNC: 0.97 MG/DL — SIGNIFICANT CHANGE UP (ref 0.5–1.3)
EGFR: 83 ML/MIN/1.73M2 — SIGNIFICANT CHANGE UP
EGFR: 83 ML/MIN/1.73M2 — SIGNIFICANT CHANGE UP
EOSINOPHIL # BLD AUTO: 0.23 K/UL — SIGNIFICANT CHANGE UP (ref 0–0.5)
EOSINOPHIL # BLD AUTO: 0.23 K/UL — SIGNIFICANT CHANGE UP (ref 0–0.5)
EOSINOPHIL NFR BLD AUTO: 2.9 % — SIGNIFICANT CHANGE UP (ref 0–6)
EOSINOPHIL NFR BLD AUTO: 2.9 % — SIGNIFICANT CHANGE UP (ref 0–6)
GLUCOSE SERPL-MCNC: 109 MG/DL — HIGH (ref 70–99)
GLUCOSE SERPL-MCNC: 109 MG/DL — HIGH (ref 70–99)
HCT VFR BLD CALC: 30.2 % — LOW (ref 39–50)
HCT VFR BLD CALC: 30.2 % — LOW (ref 39–50)
HGB BLD-MCNC: 10 G/DL — LOW (ref 13–17)
HGB BLD-MCNC: 10 G/DL — LOW (ref 13–17)
IMM GRANULOCYTES NFR BLD AUTO: 0.5 % — SIGNIFICANT CHANGE UP (ref 0–0.9)
IMM GRANULOCYTES NFR BLD AUTO: 0.5 % — SIGNIFICANT CHANGE UP (ref 0–0.9)
LYMPHOCYTES # BLD AUTO: 1.47 K/UL — SIGNIFICANT CHANGE UP (ref 1–3.3)
LYMPHOCYTES # BLD AUTO: 1.47 K/UL — SIGNIFICANT CHANGE UP (ref 1–3.3)
LYMPHOCYTES # BLD AUTO: 18.5 % — SIGNIFICANT CHANGE UP (ref 13–44)
LYMPHOCYTES # BLD AUTO: 18.5 % — SIGNIFICANT CHANGE UP (ref 13–44)
MCHC RBC-ENTMCNC: 27.1 PG — SIGNIFICANT CHANGE UP (ref 27–34)
MCHC RBC-ENTMCNC: 27.1 PG — SIGNIFICANT CHANGE UP (ref 27–34)
MCHC RBC-ENTMCNC: 33.1 GM/DL — SIGNIFICANT CHANGE UP (ref 32–36)
MCHC RBC-ENTMCNC: 33.1 GM/DL — SIGNIFICANT CHANGE UP (ref 32–36)
MCV RBC AUTO: 81.8 FL — SIGNIFICANT CHANGE UP (ref 80–100)
MCV RBC AUTO: 81.8 FL — SIGNIFICANT CHANGE UP (ref 80–100)
MONOCYTES # BLD AUTO: 0.64 K/UL — SIGNIFICANT CHANGE UP (ref 0–0.9)
MONOCYTES # BLD AUTO: 0.64 K/UL — SIGNIFICANT CHANGE UP (ref 0–0.9)
MONOCYTES NFR BLD AUTO: 8.1 % — SIGNIFICANT CHANGE UP (ref 2–14)
MONOCYTES NFR BLD AUTO: 8.1 % — SIGNIFICANT CHANGE UP (ref 2–14)
NEUTROPHILS # BLD AUTO: 5.52 K/UL — SIGNIFICANT CHANGE UP (ref 1.8–7.4)
NEUTROPHILS # BLD AUTO: 5.52 K/UL — SIGNIFICANT CHANGE UP (ref 1.8–7.4)
NEUTROPHILS NFR BLD AUTO: 69.5 % — SIGNIFICANT CHANGE UP (ref 43–77)
NEUTROPHILS NFR BLD AUTO: 69.5 % — SIGNIFICANT CHANGE UP (ref 43–77)
NRBC # BLD: 0 /100 WBCS — SIGNIFICANT CHANGE UP (ref 0–0)
NRBC # BLD: 0 /100 WBCS — SIGNIFICANT CHANGE UP (ref 0–0)
PLATELET # BLD AUTO: 230 K/UL — SIGNIFICANT CHANGE UP (ref 150–400)
PLATELET # BLD AUTO: 230 K/UL — SIGNIFICANT CHANGE UP (ref 150–400)
POTASSIUM SERPL-MCNC: 3.7 MMOL/L — SIGNIFICANT CHANGE UP (ref 3.5–5.3)
POTASSIUM SERPL-MCNC: 3.7 MMOL/L — SIGNIFICANT CHANGE UP (ref 3.5–5.3)
POTASSIUM SERPL-SCNC: 3.7 MMOL/L — SIGNIFICANT CHANGE UP (ref 3.5–5.3)
POTASSIUM SERPL-SCNC: 3.7 MMOL/L — SIGNIFICANT CHANGE UP (ref 3.5–5.3)
PROT SERPL-MCNC: 5.5 G/DL — LOW (ref 6–8.3)
PROT SERPL-MCNC: 5.5 G/DL — LOW (ref 6–8.3)
RBC # BLD: 3.69 M/UL — LOW (ref 4.2–5.8)
RBC # BLD: 3.69 M/UL — LOW (ref 4.2–5.8)
RBC # FLD: 14.6 % — HIGH (ref 10.3–14.5)
RBC # FLD: 14.6 % — HIGH (ref 10.3–14.5)
SODIUM SERPL-SCNC: 147 MMOL/L — HIGH (ref 135–145)
SODIUM SERPL-SCNC: 147 MMOL/L — HIGH (ref 135–145)
WBC # BLD: 7.94 K/UL — SIGNIFICANT CHANGE UP (ref 3.8–10.5)
WBC # BLD: 7.94 K/UL — SIGNIFICANT CHANGE UP (ref 3.8–10.5)
WBC # FLD AUTO: 7.94 K/UL — SIGNIFICANT CHANGE UP (ref 3.8–10.5)
WBC # FLD AUTO: 7.94 K/UL — SIGNIFICANT CHANGE UP (ref 3.8–10.5)

## 2023-11-23 PROCEDURE — 99232 SBSQ HOSP IP/OBS MODERATE 35: CPT

## 2023-11-23 RX ADMIN — PANTOPRAZOLE SODIUM 40 MILLIGRAM(S): 20 TABLET, DELAYED RELEASE ORAL at 05:53

## 2023-11-23 RX ADMIN — MIRTAZAPINE 7.5 MILLIGRAM(S): 45 TABLET, ORALLY DISINTEGRATING ORAL at 11:19

## 2023-11-23 RX ADMIN — GABAPENTIN 400 MILLIGRAM(S): 400 CAPSULE ORAL at 05:29

## 2023-11-23 RX ADMIN — Medication 25 MILLIGRAM(S): at 22:37

## 2023-11-23 RX ADMIN — MUPIROCIN 1 APPLICATION(S): 20 OINTMENT TOPICAL at 05:30

## 2023-11-23 RX ADMIN — Medication 200 MILLIGRAM(S): at 17:19

## 2023-11-23 RX ADMIN — Medication 25 MILLIGRAM(S): at 14:32

## 2023-11-23 RX ADMIN — LATANOPROST 1 DROP(S): 0.05 SOLUTION/ DROPS OPHTHALMIC; TOPICAL at 22:37

## 2023-11-23 RX ADMIN — GABAPENTIN 400 MILLIGRAM(S): 400 CAPSULE ORAL at 22:37

## 2023-11-23 RX ADMIN — Medication 25 MILLIGRAM(S): at 05:30

## 2023-11-23 RX ADMIN — APIXABAN 2.5 MILLIGRAM(S): 2.5 TABLET, FILM COATED ORAL at 17:19

## 2023-11-23 RX ADMIN — MODAFINIL 100 MILLIGRAM(S): 200 TABLET ORAL at 11:19

## 2023-11-23 RX ADMIN — Medication 200 MILLIGRAM(S): at 05:29

## 2023-11-23 RX ADMIN — GABAPENTIN 400 MILLIGRAM(S): 400 CAPSULE ORAL at 14:32

## 2023-11-23 RX ADMIN — MUPIROCIN 1 APPLICATION(S): 20 OINTMENT TOPICAL at 17:19

## 2023-11-23 RX ADMIN — SENNA PLUS 2 TABLET(S): 8.6 TABLET ORAL at 22:38

## 2023-11-23 RX ADMIN — APIXABAN 2.5 MILLIGRAM(S): 2.5 TABLET, FILM COATED ORAL at 05:29

## 2023-11-23 RX ADMIN — Medication 240 MILLIGRAM(S): at 05:30

## 2023-11-23 RX ADMIN — Medication 1: at 17:19

## 2023-11-23 NOTE — CONSULT NOTE ADULT - SUBJECTIVE AND OBJECTIVE BOX
Patient is a 73y old  Male who presents with a chief complaint of aspiration PNA (2023 05:06)       HPI:  74 yo male with PMHx T2DM, peripheral neuropathy, HTN, HLD, A-fib (on Eliquis), TBI (s/p SDH with EVAC on 2021), and traumatic subdural hemorrhage in 10/2022 Covid PNA, and major depression, with recent admission at [facility] ICU from -, presents with worsening cough and decreased mental status.  He was previously seen at [facility] ICU for severe metformin-induced lactic acidosis and sepsis 2/2 retrosigmoid colitis and suspected aspiration pneumonia. Metformin was discontinued at this time.  Per wife, since discharge home the pt has been doing well. He has been his normal self, at baseline patient is alert and oriented to person occasionally will answer some questions. He has been eating well. Wife tries to give him soft and bite sized food, and he coughs when he drinks water. Today the pt had worse coughing with phelgm, and the wife thought that he felt hot to the touch. She gave him Robitussin. This afternoon, he began to be less responsive than normal, so she brought him to the ED. Denies N/V/D, constipation, hematuria, rash, CP, abd pain.  ED COURSE:  Vitals: T  98 F , HR 80 , BP  156/84 , RR 18 , SpO2  95% on RA  Labs significant for: WBC 20.79, Hgb 10.7, lactate 1.9, Procal 0.25, sepsis not likely. Pro BNP 1166, COVID neg. RVP neg.  Imaging: CT head: negative  CT chest, Abd/pelvis:  Right lower lobe pneumonia and small right pleural effusion. No acute intra-abdominal or pelvic findings on unenhanced CT.  EKG: AFib, HR 88, Right superior axis deviation, incomplete RBBB  Pt received: Zosyn, solumedrol 125 mg, duoneb, 2 L NS bolus (2023 03:04)    Renal consulted for hypernatremia  Chart reviewed  Patient does not participate in history taking       PAST MEDICAL & SURGICAL HISTORY:  TBI (traumatic brain injury)      HTN (hypertension)      Atrial fibrillation      Peripheral neuropathy      Major depression      HLD (hyperlipidemia)      CAD (coronary artery disease)      BPH (benign prostatic hyperplasia)      Pneumonia due to COVID-19 virus  2022      Hemorrhage in the brain      H/O craniotomy           FAMILY HISTORY:  FH: HTN (hypertension) (Father, Mother, Sibling)    Family history of bone cancer (Sibling)    FH: Alzheimers disease (Sibling)    NC    Social History:Non smoker    MEDICATIONS  (STANDING):  apixaban 2.5 milliGRAM(s) Oral every 12 hours  cefpodoxime 200 milliGRAM(s) Oral every 12 hours  dextrose 5%. 1000 milliLiter(s) (100 mL/Hr) IV Continuous <Continuous>  dextrose 5%. 1000 milliLiter(s) (50 mL/Hr) IV Continuous <Continuous>  dextrose 50% Injectable 25 Gram(s) IV Push once  dextrose 50% Injectable 12.5 Gram(s) IV Push once  dextrose 50% Injectable 25 Gram(s) IV Push once  diltiazem    milliGRAM(s) Oral daily  gabapentin 400 milliGRAM(s) Oral three times a day  glucagon  Injectable 1 milliGRAM(s) IntraMuscular once  hydrALAZINE 25 milliGRAM(s) Oral three times a day  influenza  Vaccine (HIGH DOSE) 0.7 milliLiter(s) IntraMuscular once  insulin lispro (ADMELOG) corrective regimen sliding scale   SubCutaneous three times a day before meals  insulin lispro (ADMELOG) corrective regimen sliding scale   SubCutaneous at bedtime  latanoprost 0.005% Ophthalmic Solution 1 Drop(s) Both EYES at bedtime  mirtazapine 7.5 milliGRAM(s) Oral daily  modafinil 100 milliGRAM(s) Oral daily  mupirocin 2% Nasal 1 Application(s) Both Nostrils two times a day  pantoprazole    Tablet 40 milliGRAM(s) Oral before breakfast  senna 2 Tablet(s) Oral at bedtime  sodium chloride 0.9%. 1000 milliLiter(s) (75 mL/Hr) IV Continuous <Continuous>    MEDICATIONS  (PRN):  dextrose Oral Gel 15 Gram(s) Oral once PRN Blood Glucose LESS THAN 70 milliGRAM(s)/deciliter   Meds reviewed    Allergies    No Known Allergies    Intolerances         REVIEW OF SYSTEMS:  Did not answer questions asked      Vital Signs Last 24 Hrs  T(C): 36.8 (2023 05:11), Max: 36.8 (2023 05:11)  T(F): 98.2 (2023 05:11), Max: 98.2 (2023 05:11)  HR: 71 (2023 05:11) (71 - 88)  BP: 158/94 (2023 05:11) (127/74 - 158/94)  BP(mean): --  RR: 17 (2023 05:11) (17 - 17)  SpO2: 94% (2023 05:11) (94% - 97%)    Parameters below as of 2023 05:11  Patient On (Oxygen Delivery Method): room air      Daily     Daily Weight in k.3 (2023 05:11)    PHYSICAL EXAM:    GENERAL: NAD  HEAD:  Atraumatic, Normocephalic  NERVOUS SYSTEM: lethargic  CHEST/LUNG: Clear to percussion bilaterally; No rales, rhonchi, wheezing, or rubs  HEART: Regular rate and rhythm; No murmurs, rubs, or gallops  ABDOMEN: Soft, Nontender, Nondistended; Bowel sounds present  EXTREMITIES:  No Edema    LABS:                        9.0    9.18  )-----------( 212      ( 2023 08:15 )             28.0         145  |  110<H>  |  26<H>  ----------------------------<  119<H>  3.9   |  26  |  0.99    Ca    8.0<L>      2023 15:53  Mg     2.0         TPro  5.4<L>  /  Alb  2.7<L>  /  TBili  0.4  /  DBili  x   /  AST  8<L>  /  ALT  10<L>  /  AlkPhos  62        Urinalysis Basic - ( 2023 15:53 )    Color: x / Appearance: x / SG: x / pH: x  Gluc: 119 mg/dL / Ketone: x  / Bili: x / Urobili: x   Blood: x / Protein: x / Nitrite: x   Leuk Esterase: x / RBC: x / WBC x   Sq Epi: x / Non Sq Epi: x / Bacteria: x      Magnesium: 2.0 mg/dL ( @ 08:15)          RADIOLOGY & ADDITIONAL TESTS:

## 2023-11-23 NOTE — PROGRESS NOTE ADULT - NS ATTEND AMEND GEN_ALL_CORE FT
Patient is a 72 yo male with PMHx T2DM, peripheral neuropathy, HTN, HLD, A-fib (on Eliquis), TBI (s/p SDH with EVAC on 9/2021), and traumatic subdural hemorrhage in 10/2022 Covid PNA, and major depression, with [facility]9/30 admitted for RLL PNA management. Patient has been on tele monitor and recently noted to have 8 beats of V tach. This was the first time this happed this admission. Patient is non verbal, so unable to assess ROS, but upon discussion with his nurse, patient has not been any distress.     unable to provide a hx on the basis of TBI  With rate controlled afib, no further episodes of NSVT  normal LVEF on recent TTE  Continue Cardizem  On Eliquis 2.5mg BID, should be on 5mg BID based on weight, renal function and weight.

## 2023-11-23 NOTE — PROGRESS NOTE ADULT - SUBJECTIVE AND OBJECTIVE BOX
Patient is a 73y old  Male who presents with a chief complaint of aspiration PNA (23 Nov 2023 06:53)       INTERVAL HPI/OVERNIGHT EVENTS: Patient seen and examined at bedside. No new events noted.    MEDICATIONS  (STANDING):  apixaban 2.5 milliGRAM(s) Oral every 12 hours  cefpodoxime 200 milliGRAM(s) Oral every 12 hours  dextrose 5%. 1000 milliLiter(s) (100 mL/Hr) IV Continuous <Continuous>  dextrose 5%. 1000 milliLiter(s) (50 mL/Hr) IV Continuous <Continuous>  dextrose 50% Injectable 25 Gram(s) IV Push once  dextrose 50% Injectable 12.5 Gram(s) IV Push once  dextrose 50% Injectable 25 Gram(s) IV Push once  diltiazem    milliGRAM(s) Oral daily  gabapentin 400 milliGRAM(s) Oral three times a day  glucagon  Injectable 1 milliGRAM(s) IntraMuscular once  hydrALAZINE 25 milliGRAM(s) Oral three times a day  influenza  Vaccine (HIGH DOSE) 0.7 milliLiter(s) IntraMuscular once  insulin lispro (ADMELOG) corrective regimen sliding scale   SubCutaneous three times a day before meals  insulin lispro (ADMELOG) corrective regimen sliding scale   SubCutaneous at bedtime  latanoprost 0.005% Ophthalmic Solution 1 Drop(s) Both EYES at bedtime  mirtazapine 7.5 milliGRAM(s) Oral daily  modafinil 100 milliGRAM(s) Oral daily  mupirocin 2% Nasal 1 Application(s) Both Nostrils two times a day  pantoprazole    Tablet 40 milliGRAM(s) Oral before breakfast  senna 2 Tablet(s) Oral at bedtime  sodium chloride 0.9%. 1000 milliLiter(s) (75 mL/Hr) IV Continuous <Continuous>    MEDICATIONS  (PRN):  dextrose Oral Gel 15 Gram(s) Oral once PRN Blood Glucose LESS THAN 70 milliGRAM(s)/deciliter      Allergies    No Known Allergies    Intolerances        REVIEW OF SYSTEMS:  Unable to obtain, non verbal   Vital Signs Last 24 Hrs  T(C): 36.8 (23 Nov 2023 05:11), Max: 36.8 (23 Nov 2023 05:11)  T(F): 98.2 (23 Nov 2023 05:11), Max: 98.2 (23 Nov 2023 05:11)  HR: 71 (23 Nov 2023 05:11) (71 - 88)  BP: 158/94 (23 Nov 2023 05:11) (127/74 - 158/94)  BP(mean): --  RR: 17 (23 Nov 2023 05:11) (17 - 17)  SpO2: 94% (23 Nov 2023 05:11) (94% - 97%)    Parameters below as of 23 Nov 2023 05:11  Patient On (Oxygen Delivery Method): room air        PHYSICAL EXAM:  GENERAL: NAD, lying in bed comfortably.  EYES: EOMI,  conjunctiva and sclera clear  ENT: Moist mucous membranes  NECK: Supple, No JVD  CHEST/LUNG: transmitted upper airway sounds, no w/w/r, no accessory muscle use. on Room air  HEART: S1S2+, Regular rate and rhythm  ABDOMEN: Bowel sounds present; Soft, Nontender, Nondistended. No hepatomegaly  EXTREMITIES:  2+ Peripheral Pulses, brisk capillary refill. No clubbing, cyanosis  NERVOUS SYSTEM:  Awake, responsive, calm , cooperative  MSK: contracted extremities   SKIN: Warm dry, scattered dry skin lesions     LABS:                        9.0    9.18  )-----------( 212      ( 22 Nov 2023 08:15 )             28.0     22 Nov 2023 15:53    145    |  110    |  26     ----------------------------<  119    3.9     |  26     |  0.99     Ca    8.0        22 Nov 2023 15:53  Mg     2.0       22 Nov 2023 08:15    TPro  5.4    /  Alb  2.7    /  TBili  0.4    /  DBili  x      /  AST  8      /  ALT  10     /  AlkPhos  62     22 Nov 2023 08:15      CAPILLARY BLOOD GLUCOSE      POCT Blood Glucose.: 156 mg/dL (22 Nov 2023 21:09)  POCT Blood Glucose.: 109 mg/dL (22 Nov 2023 16:44)  POCT Blood Glucose.: 190 mg/dL (22 Nov 2023 11:57)  POCT Blood Glucose.: 139 mg/dL (22 Nov 2023 07:33)    BLOOD CULTURE  11-21 @ 05:30   No growth  --  --  11-19 @ 21:25   No growth at 72 Hours  --  --  11-19 @ 21:20   No growth at 72 Hours  --  --    RADIOLOGY & ADDITIONAL TESTS:    Imaging Personally Reviewed:  [ ] YES     Consultant(s) Notes Reviewed:      Care Discussed with Consultants/Other Providers:

## 2023-11-23 NOTE — PROGRESS NOTE ADULT - SUBJECTIVE AND OBJECTIVE BOX
[facility] -- Alexey Briceno, Agus Sepulveda Savella, , Con Lombardi  Office # [phone]    Follow Up:  NSVT    Subjective/Observations: Awake and alert, comfortable on RA, non-orthopneic.  Denies any form of respiratory or cardiac discomfort.  No tele events    REVIEW OF SYSTEMS: All other review of systems is negative unless indicated above  PAST MEDICAL & SURGICAL HISTORY:  TBI (traumatic brain injury)  HTN (hypertension)  Atrial fibrillation  Peripheral neuropathy  Major depression  HLD (hyperlipidemia)  CAD (coronary artery disease)  BPH (benign prostatic hyperplasia)  Pneumonia due to COVID-19 virus  June 2022  Hemorrhage in the brain  H/O craniotomy    MEDICATIONS  (STANDING):  apixaban 2.5 milliGRAM(s) Oral every 12 hours  cefpodoxime 200 milliGRAM(s) Oral every 12 hours  dextrose 5%. 1000 milliLiter(s) (100 mL/Hr) IV Continuous <Continuous>  dextrose 5%. 1000 milliLiter(s) (50 mL/Hr) IV Continuous <Continuous>  dextrose 50% Injectable 25 Gram(s) IV Push once  dextrose 50% Injectable 12.5 Gram(s) IV Push once  dextrose 50% Injectable 25 Gram(s) IV Push once  diltiazem    milliGRAM(s) Oral daily  gabapentin 400 milliGRAM(s) Oral three times a day  glucagon  Injectable 1 milliGRAM(s) IntraMuscular once  hydrALAZINE 25 milliGRAM(s) Oral three times a day  influenza  Vaccine (HIGH DOSE) 0.7 milliLiter(s) IntraMuscular once  insulin lispro (ADMELOG) corrective regimen sliding scale   SubCutaneous three times a day before meals  insulin lispro (ADMELOG) corrective regimen sliding scale   SubCutaneous at bedtime  latanoprost 0.005% Ophthalmic Solution 1 Drop(s) Both EYES at bedtime  mirtazapine 7.5 milliGRAM(s) Oral daily  modafinil 100 milliGRAM(s) Oral daily  mupirocin 2% Nasal 1 Application(s) Both Nostrils two times a day  pantoprazole    Tablet 40 milliGRAM(s) Oral before breakfast  senna 2 Tablet(s) Oral at bedtime  sodium chloride 0.9%. 1000 milliLiter(s) (75 mL/Hr) IV Continuous <Continuous>    MEDICATIONS  (PRN):  dextrose Oral Gel 15 Gram(s) Oral once PRN Blood Glucose LESS THAN 70 milliGRAM(s)/deciliter    Allergies    No Known Allergies    Intolerances    Vital Signs Last 24 Hrs  T(C): 36.8 (23 Nov 2023 05:11), Max: 36.8 (23 Nov 2023 05:11)  T(F): 98.2 (23 Nov 2023 05:11), Max: 98.2 (23 Nov 2023 05:11)  HR: 71 (23 Nov 2023 05:11) (71 - 88)  BP: 158/94 (23 Nov 2023 05:11) (127/74 - 158/94)  BP(mean): --  RR: 17 (23 Nov 2023 05:11) (17 - 17)  SpO2: 94% (23 Nov 2023 05:11) (94% - 97%)    Parameters below as of 23 Nov 2023 05:11  Patient On (Oxygen Delivery Method): room air    I&O's Summary    22 Nov 2023 07:01  -  23 Nov 2023 07:00  --------------------------------------------------------  IN: 0 mL / OUT: 1200 mL / NET: -1200 m      PHYSICAL EXAM:  TELE: Afib  Constitutional: NAD, awake and alert, well-developed  HEENT: Moist Mucous Membranes, Anicteric  Pulmonary: Non-labored, breath sounds are diminished bilaterally, No wheezing, rales or rhonchi  Cardiovascular: IRRR, S1 and S2, No murmurs, rubs, gallops or clicks  Gastrointestinal: Bowel Sounds present, soft, nontender.   Lymph: No peripheral edema. No lymphadenopathy.  Skin: No visible rashes or ulcers.  Psych:  Mood & affect appropriate  LABS: All Labs Reviewed:                        9.0    9.18  )-----------( 212      ( 22 Nov 2023 08:15 )             28.0                         9.0    6.21  )-----------( 233      ( 21 Nov 2023 06:30 )             27.5     22 Nov 2023 15:53    145    |  110    |  26     ----------------------------<  119    3.9     |  26     |  0.99   22 Nov 2023 08:15    148    |  114    |  26     ----------------------------<  124    3.3     |  26     |  0.95   21 Nov 2023 06:30    144    |  113    |  27     ----------------------------<  167    3.7     |  25     |  1.10     Ca    8.0        22 Nov 2023 15:53  Ca    8.2        22 Nov 2023 08:15  Ca    8.3        21 Nov 2023 06:30  Mg     2.0       22 Nov 2023 08:15    TPro  5.4    /  Alb  2.7    /  TBili  0.4    /  DBili  x      /  AST  8      /  ALT  10     /  AlkPhos  62     22 Nov 2023 08:15  TPro  5.6    /  Alb  2.7    /  TBili  0.3    /  DBili  x      /  AST  6      /  ALT  10     /  AlkPhos  70     21 Nov 2023 06:30          12 Lead ECG:   Ventricular Rate 88 BPM    QRS Duration 94 ms    Q-T Interval 374 ms    QTC Calculation(Bazett) 452 ms    R Axis 256 degrees    T Axis 18 degrees    Diagnosis Line Atrial fibrillation  Right superior axis deviation  Incomplete right bundle branch block  Right ventricular hypertrophy  Cannot rule out Anterior infarct (cited on or before 22-MAY-2022)  Abnormal ECG  Confirmed by mira Barton ([id number]) on 11/20/2023 4:05:28 PM (11-19-23 @ 23:54)      TRANSTHORACIC ECHOCARDIOGRAM REPORT  ________________________________________________________________________________                                      _______       Pt. Name:       MELBA PARMAR Study Date:    8/14/2023  MRN:            [id number]       YOB: 1950  Accession #:    [id number]      Age:           73 years  Account#:       [id number]     Gender:        M  Heart Rate:                    Height:        69.00 in (175.26 cm)  Rhythm:                        Weight:  134.00 lb (60.78 kg)  Blood Pressure: 120/74 mmHg    BSA/BMI:       1.74 m² / 19.79 kg/m²  ________________________________________________________________________________________  Referring Physician:    [id number] Ranulfo Boggs  Interpreting Physician: Lexi Seals  Primary Sonographer:    Celia Whitmore RDCS    CPT:               ECHO TTE WO CON COMP W DOPP - 18939.m  Indication(s):     Shock, unspecified - R57.9  Procedure:         Transthoracic echocardiogram with 2-D, M-mode and complete                     spectral and color flow Doppler.  Ordering Location: [facility]  Study Information: Image quality for this study is technically difficult.    _______________________________________________________________________________________     CONCLUSIONS:      1. Technically difficult image quality.   2. Left ventricular systolic function is normal with an ejection fraction visually estimated at 60 to 65 %.   3. The right ventricle is not well visualized.   4. The left atrium is mildly dilated in size.   5. Aortic valve was not well visualized.   6. Trace mitral regurgitation.   7. Trace tricuspid regurgitation.    ________________________________________________________________________________________  FINDINGS:     Left Ventricle:  Left ventricular systolic function is normal with an ejection fraction visually estimated at 60 to 65%.     Right Ventricle:  The right ventricle is not well visualized. Normal wall thickness.     Left Atrium:  The left atrium is mildly dilated in size.     Right Atrium:  The right atrium is normal in size.     Aortic Valve:  The aortic valve was not well visualized.     Mitral Valve:  There is trace mitral regurgitation.     Tricuspid Valve:  There is trace tricuspid regurgitation. Estimated pulmonary artery systolic pressure is 29 mmHg.     Pulmonic Valve:  The pulmonic valve was not well visualized.     Pericardium:  No pericardial effusion seen.     Systemic Veins:  The inferior vena cava is normal in size (normal <2.1cm) with normal inspiratory collapse (normal >50%) consistent with normal right atrial pressure (~3, range 0-5mmHg).  ____________________________________________________________________  Quantitative Data:  Left Ventricle Measurements: (Indexed to BSA)     IVSd (2D):   1.2 cm  LVPWd (2D):  1.2 cm  LVIDd (2D):  4.1 cm  LVIDs (2D):  2.6 cm  LV Mass:     167 g  96.0 g/m²  Visualized LV EF%: 60 to 65%     MV E Vmax:    0.73 m/s  e' lateral:   12.00 cm/s  e' medial:    8.49 cm/s  E/e' lateral: 6.12  E/e' medial:  8.65  E/e' Average: 7.16  MV DT:        120 msec   Left Atrium Measurements: (Indexed to BSA)  LA Diam 2D: 4.10 cm    LVOT / RVOT/ Qp/Qs Data: (Indexed to BSA)  LVOT Diameter: 2.00 cm    Mitral Valve Measurements:     MV E Vmax: 0.7 m/s       Tricuspid Valve Measurements:     TR Vmax:          2.5 m/s  TR Peak Gradient: 25.6 mmHg  RA Pressure:      3 mmHg  PASP:             29 mmHg    ________________________________________________________________________________________  Electronically signed on8/15/2023 at 12:43:28 PM by Lexi Seals    *** Final ***      [facility] -- Alexey Briceno, Agus Sepulveda Savella, , Con Lombardi  Office # [phone]    Follow Up:  NSVT    Subjective/Observations: Awake and alert, comfortable on RA, non-orthopneic.  Denies any form of respiratory or cardiac discomfort.  No tele events    REVIEW OF SYSTEMS: All other review of systems is negative unless indicated above  PAST MEDICAL & SURGICAL HISTORY:  TBI (traumatic brain injury)  HTN (hypertension)  Atrial fibrillation  Peripheral neuropathy  Major depression  HLD (hyperlipidemia)  CAD (coronary artery disease)  BPH (benign prostatic hyperplasia)  Pneumonia due to COVID-19 virus  June 2022  Hemorrhage in the brain  H/O craniotomy    MEDICATIONS  (STANDING):  apixaban 2.5 milliGRAM(s) Oral every 12 hours  cefpodoxime 200 milliGRAM(s) Oral every 12 hours  dextrose 5%. 1000 milliLiter(s) (100 mL/Hr) IV Continuous <Continuous>  dextrose 5%. 1000 milliLiter(s) (50 mL/Hr) IV Continuous <Continuous>  dextrose 50% Injectable 25 Gram(s) IV Push once  dextrose 50% Injectable 12.5 Gram(s) IV Push once  dextrose 50% Injectable 25 Gram(s) IV Push once  diltiazem    milliGRAM(s) Oral daily  gabapentin 400 milliGRAM(s) Oral three times a day  glucagon  Injectable 1 milliGRAM(s) IntraMuscular once  hydrALAZINE 25 milliGRAM(s) Oral three times a day  influenza  Vaccine (HIGH DOSE) 0.7 milliLiter(s) IntraMuscular once  insulin lispro (ADMELOG) corrective regimen sliding scale   SubCutaneous three times a day before meals  insulin lispro (ADMELOG) corrective regimen sliding scale   SubCutaneous at bedtime  latanoprost 0.005% Ophthalmic Solution 1 Drop(s) Both EYES at bedtime  mirtazapine 7.5 milliGRAM(s) Oral daily  modafinil 100 milliGRAM(s) Oral daily  mupirocin 2% Nasal 1 Application(s) Both Nostrils two times a day  pantoprazole    Tablet 40 milliGRAM(s) Oral before breakfast  senna 2 Tablet(s) Oral at bedtime  sodium chloride 0.9%. 1000 milliLiter(s) (75 mL/Hr) IV Continuous <Continuous>    MEDICATIONS  (PRN):  dextrose Oral Gel 15 Gram(s) Oral once PRN Blood Glucose LESS THAN 70 milliGRAM(s)/deciliter    Allergies    No Known Allergies    Intolerances    Vital Signs Last 24 Hrs  T(C): 36.8 (23 Nov 2023 05:11), Max: 36.8 (23 Nov 2023 05:11)  T(F): 98.2 (23 Nov 2023 05:11), Max: 98.2 (23 Nov 2023 05:11)  HR: 71 (23 Nov 2023 05:11) (71 - 88)  BP: 158/94 (23 Nov 2023 05:11) (127/74 - 158/94)  BP(mean): --  RR: 17 (23 Nov 2023 05:11) (17 - 17)  SpO2: 94% (23 Nov 2023 05:11) (94% - 97%)    Parameters below as of 23 Nov 2023 05:11  Patient On (Oxygen Delivery Method): room air    I&O's Summary    22 Nov 2023 07:01  -  23 Nov 2023 07:00  --------------------------------------------------------  IN: 0 mL / OUT: 1200 mL / NET: -1200 m      PHYSICAL EXAM:  TELE: Afib  Constitutional: NAD, awake and alert, well-developed  HEENT: Moist Mucous Membranes, Anicteric  Pulmonary: Non-labored, breath sounds are diminished bilaterally, No wheezing, rales or rhonchi  Cardiovascular: IRRR, S1 and S2, No murmurs, rubs, gallops or clicks  Gastrointestinal: Bowel Sounds present, soft, nontender.   Lymph: No peripheral edema. No lymphadenopathy.  Skin: No visible rashes or ulcers.  Psych:  Mood & affect appropriate  LABS: All Labs Reviewed:                        9.0    9.18  )-----------( 212      ( 22 Nov 2023 08:15 )             28.0                         9.0    6.21  )-----------( 233      ( 21 Nov 2023 06:30 )             27.5     22 Nov 2023 15:53    145    |  110    |  26     ----------------------------<  119    3.9     |  26     |  0.99   22 Nov 2023 08:15    148    |  114    |  26     ----------------------------<  124    3.3     |  26     |  0.95   21 Nov 2023 06:30    144    |  113    |  27     ----------------------------<  167    3.7     |  25     |  1.10     Ca    8.0        22 Nov 2023 15:53  Ca    8.2        22 Nov 2023 08:15  Ca    8.3        21 Nov 2023 06:30  Mg     2.0       22 Nov 2023 08:15    TPro  5.4    /  Alb  2.7    /  TBili  0.4    /  DBili  x      /  AST  8      /  ALT  10     /  AlkPhos  62     22 Nov 2023 08:15  TPro  5.6    /  Alb  2.7    /  TBili  0.3    /  DBili  x      /  AST  6      /  ALT  10     /  AlkPhos  70     21 Nov 2023 06:30    12 Lead ECG:   Ventricular Rate 88 BPM    QRS Duration 94 ms    Q-T Interval 374 ms    QTC Calculation(Bazett) 452 ms    R Axis 256 degrees    T Axis 18 degrees    Diagnosis Line Atrial fibrillation  Right superior axis deviation  Incomplete right bundle branch block  Right ventricular hypertrophy  Cannot rule out Anterior infarct (cited on or before 22-MAY-2022)  Abnormal ECG  Confirmed by mira Barton ([id number]) on 11/20/2023 4:05:28 PM (11-19-23 @ 23:54)      TRANSTHORACIC ECHOCARDIOGRAM REPORT  ________________________________________________________________________________                                      _______       Pt. Name:       MELBA PARMAR Study Date:    8/14/2023  MRN:            [id number]       YOB: 1950  Accession #:    [id number]      Age:           73 years  Account#:       [id number]     Gender:        M  Heart Rate:                    Height:        69.00 in (175.26 cm)  Rhythm:                        Weight:  134.00 lb (60.78 kg)  Blood Pressure: 120/74 mmHg    BSA/BMI:       1.74 m² / 19.79 kg/m²  ________________________________________________________________________________________  Referring Physician:    [id number] Ranulfo Boggs  Interpreting Physician: Lexi Seals  Primary Sonographer:    Celia Whitmore RDCS    CPT:               ECHO TTE WO CON COMP W DOPP - 28931.m  Indication(s):     Shock, unspecified - R57.9  Procedure:         Transthoracic echocardiogram with 2-D, M-mode and complete                     spectral and color flow Doppler.  Ordering Location: [facility]  Study Information: Image quality for this study is technically difficult.    _______________________________________________________________________________________     CONCLUSIONS:      1. Technically difficult image quality.   2. Left ventricular systolic function is normal with an ejection fraction visually estimated at 60 to 65 %.   3. The right ventricle is not well visualized.   4. The left atrium is mildly dilated in size.   5. Aortic valve was not well visualized.   6. Trace mitral regurgitation.   7. Trace tricuspid regurgitation.    ________________________________________________________________________________________  FINDINGS:     Left Ventricle:  Left ventricular systolic function is normal with an ejection fraction visually estimated at 60 to 65%.     Right Ventricle:  The right ventricle is not well visualized. Normal wall thickness.     Left Atrium:  The left atrium is mildly dilated in size.     Right Atrium:  The right atrium is normal in size.     Aortic Valve:  The aortic valve was not well visualized.     Mitral Valve:  There is trace mitral regurgitation.     Tricuspid Valve:  There is trace tricuspid regurgitation. Estimated pulmonary artery systolic pressure is 29 mmHg.     Pulmonic Valve:  The pulmonic valve was not well visualized.     Pericardium:  No pericardial effusion seen.     Systemic Veins:  The inferior vena cava is normal in size (normal <2.1cm) with normal inspiratory collapse (normal >50%) consistent with normal right atrial pressure (~3, range 0-5mmHg).  ____________________________________________________________________  Quantitative Data:  Left Ventricle Measurements: (Indexed to BSA)     IVSd (2D):   1.2 cm  LVPWd (2D):  1.2 cm  LVIDd (2D):  4.1 cm  LVIDs (2D):  2.6 cm  LV Mass:     167 g  96.0 g/m²  Visualized LV EF%: 60 to 65%     MV E Vmax:    0.73 m/s  e' lateral:   12.00 cm/s  e' medial:    8.49 cm/s  E/e' lateral: 6.12  E/e' medial:  8.65  E/e' Average: 7.16  MV DT:        120 msec   Left Atrium Measurements: (Indexed to BSA)  LA Diam 2D: 4.10 cm    LVOT / RVOT/ Qp/Qs Data: (Indexed to BSA)  LVOT Diameter: 2.00 cm    Mitral Valve Measurements:     MV E Vmax: 0.7 m/s       Tricuspid Valve Measurements:     TR Vmax:          2.5 m/s  TR Peak Gradient: 25.6 mmHg  RA Pressure:      3 mmHg  PASP:             29 mmHg    ________________________________________________________________________________________  Electronically signed on8/15/2023 at 12:43:28 PM by Lexi Seals    *** Final ***      [facility] -- Alexey Briceno, Agus Sepulveda Savella, , Cno Lombardi  Office # [phone]    Follow Up:  NSVT    Subjective/Observations: Awake and alert, comfortable on RA, non-orthopneic.  Denies any form of respiratory or cardiac discomfort.  No tele events    REVIEW OF SYSTEMS: All other review of systems is negative unless indicated above  PAST MEDICAL & SURGICAL HISTORY:  TBI (traumatic brain injury)  HTN (hypertension)  Atrial fibrillation  Peripheral neuropathy  Major depression  HLD (hyperlipidemia)  CAD (coronary artery disease)  BPH (benign prostatic hyperplasia)  Pneumonia due to COVID-19 virus  June 2022  Hemorrhage in the brain  H/O craniotomy    MEDICATIONS  (STANDING):  apixaban 2.5 milliGRAM(s) Oral every 12 hours  cefpodoxime 200 milliGRAM(s) Oral every 12 hours  dextrose 5%. 1000 milliLiter(s) (100 mL/Hr) IV Continuous <Continuous>  dextrose 5%. 1000 milliLiter(s) (50 mL/Hr) IV Continuous <Continuous>  dextrose 50% Injectable 25 Gram(s) IV Push once  dextrose 50% Injectable 12.5 Gram(s) IV Push once  dextrose 50% Injectable 25 Gram(s) IV Push once  diltiazem    milliGRAM(s) Oral daily  gabapentin 400 milliGRAM(s) Oral three times a day  glucagon  Injectable 1 milliGRAM(s) IntraMuscular once  hydrALAZINE 25 milliGRAM(s) Oral three times a day  influenza  Vaccine (HIGH DOSE) 0.7 milliLiter(s) IntraMuscular once  insulin lispro (ADMELOG) corrective regimen sliding scale   SubCutaneous three times a day before meals  insulin lispro (ADMELOG) corrective regimen sliding scale   SubCutaneous at bedtime  latanoprost 0.005% Ophthalmic Solution 1 Drop(s) Both EYES at bedtime  mirtazapine 7.5 milliGRAM(s) Oral daily  modafinil 100 milliGRAM(s) Oral daily  mupirocin 2% Nasal 1 Application(s) Both Nostrils two times a day  pantoprazole    Tablet 40 milliGRAM(s) Oral before breakfast  senna 2 Tablet(s) Oral at bedtime  sodium chloride 0.9%. 1000 milliLiter(s) (75 mL/Hr) IV Continuous <Continuous>    MEDICATIONS  (PRN):  dextrose Oral Gel 15 Gram(s) Oral once PRN Blood Glucose LESS THAN 70 milliGRAM(s)/deciliter    Allergies    No Known Allergies    Intolerances    Vital Signs Last 24 Hrs  T(C): 36.8 (23 Nov 2023 05:11), Max: 36.8 (23 Nov 2023 05:11)  T(F): 98.2 (23 Nov 2023 05:11), Max: 98.2 (23 Nov 2023 05:11)  HR: 71 (23 Nov 2023 05:11) (71 - 88)  BP: 158/94 (23 Nov 2023 05:11) (127/74 - 158/94)  BP(mean): --  RR: 17 (23 Nov 2023 05:11) (17 - 17)  SpO2: 94% (23 Nov 2023 05:11) (94% - 97%)    Parameters below as of 23 Nov 2023 05:11  Patient On (Oxygen Delivery Method): room air    I&O's Summary    22 Nov 2023 07:01  -  23 Nov 2023 07:00  --------------------------------------------------------  IN: 0 mL / OUT: 1200 mL / NET: -1200 m      PHYSICAL EXAM:  TELE: Afib  Constitutional: NAD, awake and alert, well-developed  HEENT: Moist Mucous Membranes, Anicteric  Pulmonary: Non-labored, breath sounds are diminished bilaterally, No wheezing, rales or rhonchi  Cardiovascular: IRRR, S1 and S2, No murmurs, rubs, gallops or clicks  Gastrointestinal: Bowel Sounds present, soft, nontender.   Lymph: No peripheral edema. No lymphadenopathy.  Skin: No visible rashes or ulcers.  Psych:  unable to assess    LABS: All Labs Reviewed:                        9.0    9.18  )-----------( 212      ( 22 Nov 2023 08:15 )             28.0                         9.0    6.21  )-----------( 233      ( 21 Nov 2023 06:30 )             27.5     22 Nov 2023 15:53    145    |  110    |  26     ----------------------------<  119    3.9     |  26     |  0.99   22 Nov 2023 08:15    148    |  114    |  26     ----------------------------<  124    3.3     |  26     |  0.95   21 Nov 2023 06:30    144    |  113    |  27     ----------------------------<  167    3.7     |  25     |  1.10     Ca    8.0        22 Nov 2023 15:53  Ca    8.2        22 Nov 2023 08:15  Ca    8.3        21 Nov 2023 06:30  Mg     2.0       22 Nov 2023 08:15    TPro  5.4    /  Alb  2.7    /  TBili  0.4    /  DBili  x      /  AST  8      /  ALT  10     /  AlkPhos  62     22 Nov 2023 08:15  TPro  5.6    /  Alb  2.7    /  TBili  0.3    /  DBili  x      /  AST  6      /  ALT  10     /  AlkPhos  70     21 Nov 2023 06:30    12 Lead ECG:   Ventricular Rate 88 BPM    QRS Duration 94 ms    Q-T Interval 374 ms    QTC Calculation(Bazett) 452 ms    R Axis 256 degrees    T Axis 18 degrees    Diagnosis Line Atrial fibrillation  Right superior axis deviation  Incomplete right bundle branch block  Right ventricular hypertrophy  Cannot rule out Anterior infarct (cited on or before 22-MAY-2022)  Abnormal ECG  Confirmed by mira Barton ([id number]) on 11/20/2023 4:05:28 PM (11-19-23 @ 23:54)      TRANSTHORACIC ECHOCARDIOGRAM REPORT  ________________________________________________________________________________                                      _______       Pt. Name:       MELBA PARMAR Study Date:    8/14/2023  MRN:            [id number]       YOB: 1950  Accession #:    [id number]      Age:           73 years  Account#:       [id number]     Gender:        M  Heart Rate:                    Height:        69.00 in (175.26 cm)  Rhythm:                        Weight:  134.00 lb (60.78 kg)  Blood Pressure: 120/74 mmHg    BSA/BMI:       1.74 m² / 19.79 kg/m²  ________________________________________________________________________________________  Referring Physician:    [id number] Ranulfo Boggs  Interpreting Physician: Lexi Seals  Primary Sonographer:    Celia Whitmore RDCS    CPT:               ECHO TTE WO CON COMP W DOPP - 66943.m  Indication(s):     Shock, unspecified - R57.9  Procedure:         Transthoracic echocardiogram with 2-D, M-mode and complete                     spectral and color flow Doppler.  Ordering Location: ICU[facility]  Study Information: Image quality for this study is technically difficult.    _______________________________________________________________________________________     CONCLUSIONS:      1. Technically difficult image quality.   2. Left ventricular systolic function is normal with an ejection fraction visually estimated at 60 to 65 %.   3. The right ventricle is not well visualized.   4. The left atrium is mildly dilated in size.   5. Aortic valve was not well visualized.   6. Trace mitral regurgitation.   7. Trace tricuspid regurgitation.    ________________________________________________________________________________________  FINDINGS:     Left Ventricle:  Left ventricular systolic function is normal with an ejection fraction visually estimated at 60 to 65%.     Right Ventricle:  The right ventricle is not well visualized. Normal wall thickness.     Left Atrium:  The left atrium is mildly dilated in size.     Right Atrium:  The right atrium is normal in size.     Aortic Valve:  The aortic valve was not well visualized.     Mitral Valve:  There is trace mitral regurgitation.     Tricuspid Valve:  There is trace tricuspid regurgitation. Estimated pulmonary artery systolic pressure is 29 mmHg.     Pulmonic Valve:  The pulmonic valve was not well visualized.     Pericardium:  No pericardial effusion seen.     Systemic Veins:  The inferior vena cava is normal in size (normal <2.1cm) with normal inspiratory collapse (normal >50%) consistent with normal right atrial pressure (~3, range 0-5mmHg).  ____________________________________________________________________  Quantitative Data:  Left Ventricle Measurements: (Indexed to BSA)     IVSd (2D):   1.2 cm  LVPWd (2D):  1.2 cm  LVIDd (2D):  4.1 cm  LVIDs (2D):  2.6 cm  LV Mass:     167 g  96.0 g/m²  Visualized LV EF%: 60 to 65%     MV E Vmax:    0.73 m/s  e' lateral:   12.00 cm/s  e' medial:    8.49 cm/s  E/e' lateral: 6.12  E/e' medial:  8.65  E/e' Average: 7.16  MV DT:        120 msec   Left Atrium Measurements: (Indexed to BSA)  LA Diam 2D: 4.10 cm    LVOT / RVOT/ Qp/Qs Data: (Indexed to BSA)  LVOT Diameter: 2.00 cm    Mitral Valve Measurements:     MV E Vmax: 0.7 m/s       Tricuspid Valve Measurements:     TR Vmax:          2.5 m/s  TR Peak Gradient: 25.6 mmHg  RA Pressure:      3 mmHg  PASP:             29 mmHg    ________________________________________________________________________________________  Electronically signed on8/15/2023 at 12:43:28 PM by Lexi Seals    *** Final ***

## 2023-11-23 NOTE — CONSULT NOTE ADULT - ASSESSMENT
CKD Stage 2  Hypernatremia  Hypokalemia  PNA      -Stable renal function at baseline  -Hypernatremia and hypokalemia have been corrected  -If recurs, will check urine studies to investigate further  -PO hydration/intake encouraged  -IVF with hypotonic fluids will be given if hypernatremia recurs  -Abx  -Daily chem; will monitor    Thank you

## 2023-11-23 NOTE — PROGRESS NOTE ADULT - SUBJECTIVE AND OBJECTIVE BOX
Date/Time Patient Seen:  		  Referring MD:   Data Reviewed	       Patient is a 73y old  Male who presents with a chief complaint of aspiration PNA (22 Nov 2023 18:43)      Subjective/HPI     PAST MEDICAL & SURGICAL HISTORY:  TBI (traumatic brain injury)    HTN (hypertension)    Atrial fibrillation    DM (diabetes mellitus)    Peripheral neuropathy    Major depression    HLD (hyperlipidemia)    CAD (coronary artery disease)    BPH (benign prostatic hyperplasia)    Pneumonia due to COVID-19 virus  June 2022    Hemorrhage in the brain    No significant past surgical history    No significant past surgical history    H/O craniotomy          Medication list         MEDICATIONS  (STANDING):  apixaban 2.5 milliGRAM(s) Oral every 12 hours  cefpodoxime 200 milliGRAM(s) Oral every 12 hours  dextrose 5%. 1000 milliLiter(s) (100 mL/Hr) IV Continuous <Continuous>  dextrose 5%. 1000 milliLiter(s) (50 mL/Hr) IV Continuous <Continuous>  dextrose 50% Injectable 25 Gram(s) IV Push once  dextrose 50% Injectable 25 Gram(s) IV Push once  dextrose 50% Injectable 12.5 Gram(s) IV Push once  diltiazem    milliGRAM(s) Oral daily  gabapentin 400 milliGRAM(s) Oral three times a day  glucagon  Injectable 1 milliGRAM(s) IntraMuscular once  hydrALAZINE 25 milliGRAM(s) Oral three times a day  influenza  Vaccine (HIGH DOSE) 0.7 milliLiter(s) IntraMuscular once  insulin lispro (ADMELOG) corrective regimen sliding scale   SubCutaneous three times a day before meals  insulin lispro (ADMELOG) corrective regimen sliding scale   SubCutaneous at bedtime  latanoprost 0.005% Ophthalmic Solution 1 Drop(s) Both EYES at bedtime  mirtazapine 7.5 milliGRAM(s) Oral daily  modafinil 100 milliGRAM(s) Oral daily  mupirocin 2% Nasal 1 Application(s) Both Nostrils two times a day  pantoprazole    Tablet 40 milliGRAM(s) Oral before breakfast  senna 2 Tablet(s) Oral at bedtime  sodium chloride 0.9%. 1000 milliLiter(s) (75 mL/Hr) IV Continuous <Continuous>    MEDICATIONS  (PRN):  dextrose Oral Gel 15 Gram(s) Oral once PRN Blood Glucose LESS THAN 70 milliGRAM(s)/deciliter         Vitals log        ICU Vital Signs Last 24 Hrs  T(C): 36.7 (22 Nov 2023 20:20), Max: 36.7 (22 Nov 2023 20:20)  T(F): 98.1 (22 Nov 2023 20:20), Max: 98.1 (22 Nov 2023 20:20)  HR: 88 (22 Nov 2023 20:20) (88 - 88)  BP: 127/74 (22 Nov 2023 20:20) (127/74 - 127/74)  BP(mean): --  ABP: --  ABP(mean): --  RR: 17 (22 Nov 2023 20:20) (17 - 17)  SpO2: 97% (22 Nov 2023 20:20) (97% - 97%)    O2 Parameters below as of 22 Nov 2023 20:20  Patient On (Oxygen Delivery Method): room air                 Input and Output:  I&O's Detail    21 Nov 2023 07:01  -  22 Nov 2023 07:00  --------------------------------------------------------  IN:    Oral Fluid: 450 mL  Total IN: 450 mL    OUT:    Voided (mL): 1500 mL  Total OUT: 1500 mL    Total NET: -1050 mL          Lab Data                        9.0    9.18  )-----------( 212      ( 22 Nov 2023 08:15 )             28.0     11-22    145  |  110<H>  |  26<H>  ----------------------------<  119<H>  3.9   |  26  |  0.99    Ca    8.0<L>      22 Nov 2023 15:53  Mg     2.0     11-22    TPro  5.4<L>  /  Alb  2.7<L>  /  TBili  0.4  /  DBili  x   /  AST  8<L>  /  ALT  10<L>  /  AlkPhos  62  11-22            Review of Systems	      Objective     Physical Examination    heart s1s2  lung dc BS  head nc      Pertinent Lab findings & Imaging      Geraldo:  NO   Adequate UO     I&O's Detail    21 Nov 2023 07:01  -  22 Nov 2023 07:00  --------------------------------------------------------  IN:    Oral Fluid: 450 mL  Total IN: 450 mL    OUT:    Voided (mL): 1500 mL  Total OUT: 1500 mL    Total NET: -1050 mL               Discussed with:     Cultures:	        Radiology

## 2023-11-23 NOTE — PROGRESS NOTE ADULT - ASSESSMENT
72 yo male with PMHx T2DM, peripheral neuropathy, HTN, HLD, A-fib (on Eliquis), TBI (s/p SDH with EVAC on 9/2021), and traumatic subdural hemorrhage in 10/2022 Covid PNA, and major depression, with recent admission at [facility] ICU from 9/25-9/30 admitted for RLL PNA management. Patient has been on tele monitor and recently noted to have 8 beats of V tach. This was the first time this happed this admission. Patient is non verbal, so unable to assess ROS, but upon discussion with his nurse, patient has not been any distress.     Afib/  - Patient no acute distress  - Hemodynamically stable   - ECG 11/19/23: 88bpm, Afib, RBBB, RVH   - 8 beats of V tach noted on tele   - Patient is rate controlled in af on current regimen and ef is normal  - Continue home Cardizem 240mg daily for rate control  - Continue to manage conservatively --> if continues to have episodes of V tach, consider adding Metoprolol   - Monitor and replete lytes, keep K>4, Mg>2.  - Continue to monitor on tele       - Other cardiovascular workup will depend on clinical course.  - All other workup per primary team.  - Will continue to follow.    72 yo male with PMHx T2DM, peripheral neuropathy, HTN, HLD, A-fib (on Eliquis), TBI (s/p SDH with EVAC on 9/2021), and traumatic subdural hemorrhage in 10/2022 Covid PNA, and major depression, with [facility]9/30 admitted for RLL PNA management. Patient has been on tele monitor and recently noted to have 8 beats of V tach. This was the first time this happed this admission. Patient is non verbal, so unable to assess ROS, but upon discussion with his nurse, patient has not been any distress.     Afib/NSVT  - Rate-controlled Afib on tele.  No further NSVT noted  - Can continue tele for now.  If no further NST, can D/C in am, 11/24  - TTE normal  - Continue Cardizem CD  - If further NSVT, can add Lopressor low dose  - ECG 11/19/23: 88bpm, Afib, RBBB, RVH     - BP stable  - Continue Hydralazine    - No evidence of volume overload  - Non-orthopneic on RA    - Monitor and replete lytes, keep K>4, Mg>2.  - Will continue to follow.    Allison Perez DNP, NP-C, AGACNP-C  Cardiology   Call TEAMS           74 yo male with PMHx T2DM, peripheral neuropathy, HTN, HLD, A-fib (on Eliquis), TBI (s/p SDH with EVAC on 9/2021), and traumatic subdural hemorrhage in 10/2022 Covid PNA, and major depression, with [facility]9/30 admitted for RLL PNA management. Patient has been on tele monitor and recently noted to have 8 beats of V tach. This was the first time this happed this admission. Patient is non verbal, so unable to assess ROS, but upon discussion with his nurse, patient has not been any distress.     Afib/NSVT  - Rate-controlled Afib on tele.  No further NSVT noted  - Can continue tele for now.  If no further NST, can D/C in am, 11/24  - TTE normal  - Continue Cardizem CD  - ECG 11/19/23: 88bpm, Afib, RBBB, RVH   - Should be on 5mg Eliquis BID based on weight, renal function and age.     - BP stable  - Continue Hydralazine    - No evidence of volume overload  - Non-orthopneic on RA    - Monitor and replete lytes, keep K>4, Mg>2.  - Will continue to follow.    Allison Perez DNP, NP-C, AGACNP-C  Cardiology   Call TEAMS

## 2023-11-23 NOTE — PROGRESS NOTE ADULT - ASSESSMENT
72 yo male with PMHx T2DM, peripheral neuropathy, HTN, HLD, A-fib (on Eliquis), TBI (s/p SDH with EVAC on 9/2021), and traumatic subdural hemorrhage in 10/2022 Covid PNA, and major depression, with recent admission at [facility] ICU from 9/25-9/30, presents with worsening cough and decreased mental status.  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 Metformin was discontinued at this time.    pna  ams  frailty  weakness  DM  neuropathy  HLD  HTN  TBI hx   SDH hx  Depression    swallow eval noted  cardio f/u  vs noted  on ABX    SLP eval done  emp ABX  HOB elev  asp prec  oral hygiene  assist with needs  monitor mentation  DM care  serial FS  old records reviewed  TTE reviewed - grossly int  prognosis guarded  GOC - pt is DNR

## 2023-11-24 ENCOUNTER — TRANSCRIPTION ENCOUNTER (OUTPATIENT)
Age: 73
End: 2023-11-24

## 2023-11-24 VITALS
DIASTOLIC BLOOD PRESSURE: 77 MMHG | SYSTOLIC BLOOD PRESSURE: 117 MMHG | TEMPERATURE: 98 F | OXYGEN SATURATION: 96 % | RESPIRATION RATE: 18 BRPM | HEART RATE: 75 BPM

## 2023-11-24 LAB
ANION GAP SERPL CALC-SCNC: 6 MMOL/L — SIGNIFICANT CHANGE UP (ref 5–17)
ANION GAP SERPL CALC-SCNC: 6 MMOL/L — SIGNIFICANT CHANGE UP (ref 5–17)
BUN SERPL-MCNC: 31 MG/DL — HIGH (ref 7–23)
BUN SERPL-MCNC: 31 MG/DL — HIGH (ref 7–23)
CALCIUM SERPL-MCNC: 8.5 MG/DL — SIGNIFICANT CHANGE UP (ref 8.5–10.1)
CALCIUM SERPL-MCNC: 8.5 MG/DL — SIGNIFICANT CHANGE UP (ref 8.5–10.1)
CHLORIDE SERPL-SCNC: 115 MMOL/L — HIGH (ref 96–108)
CHLORIDE SERPL-SCNC: 115 MMOL/L — HIGH (ref 96–108)
CO2 SERPL-SCNC: 25 MMOL/L — SIGNIFICANT CHANGE UP (ref 22–31)
CO2 SERPL-SCNC: 25 MMOL/L — SIGNIFICANT CHANGE UP (ref 22–31)
CREAT SERPL-MCNC: 1.2 MG/DL — SIGNIFICANT CHANGE UP (ref 0.5–1.3)
CREAT SERPL-MCNC: 1.2 MG/DL — SIGNIFICANT CHANGE UP (ref 0.5–1.3)
EGFR: 65 ML/MIN/1.73M2 — SIGNIFICANT CHANGE UP
EGFR: 65 ML/MIN/1.73M2 — SIGNIFICANT CHANGE UP
GLUCOSE SERPL-MCNC: 117 MG/DL — HIGH (ref 70–99)
GLUCOSE SERPL-MCNC: 117 MG/DL — HIGH (ref 70–99)
HCT VFR BLD CALC: 33.6 % — LOW (ref 39–50)
HCT VFR BLD CALC: 33.6 % — LOW (ref 39–50)
HGB BLD-MCNC: 10.9 G/DL — LOW (ref 13–17)
HGB BLD-MCNC: 10.9 G/DL — LOW (ref 13–17)
MCHC RBC-ENTMCNC: 26.8 PG — LOW (ref 27–34)
MCHC RBC-ENTMCNC: 26.8 PG — LOW (ref 27–34)
MCHC RBC-ENTMCNC: 32.4 GM/DL — SIGNIFICANT CHANGE UP (ref 32–36)
MCHC RBC-ENTMCNC: 32.4 GM/DL — SIGNIFICANT CHANGE UP (ref 32–36)
MCV RBC AUTO: 82.6 FL — SIGNIFICANT CHANGE UP (ref 80–100)
MCV RBC AUTO: 82.6 FL — SIGNIFICANT CHANGE UP (ref 80–100)
NRBC # BLD: 0 /100 WBCS — SIGNIFICANT CHANGE UP (ref 0–0)
NRBC # BLD: 0 /100 WBCS — SIGNIFICANT CHANGE UP (ref 0–0)
PLATELET # BLD AUTO: 200 K/UL — SIGNIFICANT CHANGE UP (ref 150–400)
PLATELET # BLD AUTO: 200 K/UL — SIGNIFICANT CHANGE UP (ref 150–400)
POTASSIUM SERPL-MCNC: 3.8 MMOL/L — SIGNIFICANT CHANGE UP (ref 3.5–5.3)
POTASSIUM SERPL-MCNC: 3.8 MMOL/L — SIGNIFICANT CHANGE UP (ref 3.5–5.3)
POTASSIUM SERPL-SCNC: 3.8 MMOL/L — SIGNIFICANT CHANGE UP (ref 3.5–5.3)
POTASSIUM SERPL-SCNC: 3.8 MMOL/L — SIGNIFICANT CHANGE UP (ref 3.5–5.3)
RBC # BLD: 4.07 M/UL — LOW (ref 4.2–5.8)
RBC # BLD: 4.07 M/UL — LOW (ref 4.2–5.8)
RBC # FLD: 14.6 % — HIGH (ref 10.3–14.5)
RBC # FLD: 14.6 % — HIGH (ref 10.3–14.5)
SODIUM SERPL-SCNC: 146 MMOL/L — HIGH (ref 135–145)
SODIUM SERPL-SCNC: 146 MMOL/L — HIGH (ref 135–145)
WBC # BLD: 8.6 K/UL — SIGNIFICANT CHANGE UP (ref 3.8–10.5)
WBC # BLD: 8.6 K/UL — SIGNIFICANT CHANGE UP (ref 3.8–10.5)
WBC # FLD AUTO: 8.6 K/UL — SIGNIFICANT CHANGE UP (ref 3.8–10.5)
WBC # FLD AUTO: 8.6 K/UL — SIGNIFICANT CHANGE UP (ref 3.8–10.5)

## 2023-11-24 PROCEDURE — 71250 CT THORAX DX C-: CPT | Mod: MA

## 2023-11-24 PROCEDURE — 87641 MR-STAPH DNA AMP PROBE: CPT

## 2023-11-24 PROCEDURE — 94640 AIRWAY INHALATION TREATMENT: CPT

## 2023-11-24 PROCEDURE — 85730 THROMBOPLASTIN TIME PARTIAL: CPT

## 2023-11-24 PROCEDURE — A9698: CPT

## 2023-11-24 PROCEDURE — 80048 BASIC METABOLIC PNL TOTAL CA: CPT

## 2023-11-24 PROCEDURE — 87086 URINE CULTURE/COLONY COUNT: CPT

## 2023-11-24 PROCEDURE — 99239 HOSP IP/OBS DSCHRG MGMT >30: CPT

## 2023-11-24 PROCEDURE — 87449 NOS EACH ORGANISM AG IA: CPT

## 2023-11-24 PROCEDURE — 82728 ASSAY OF FERRITIN: CPT

## 2023-11-24 PROCEDURE — 74230 X-RAY XM SWLNG FUNCJ C+: CPT

## 2023-11-24 PROCEDURE — 83735 ASSAY OF MAGNESIUM: CPT

## 2023-11-24 PROCEDURE — 92611 MOTION FLUOROSCOPY/SWALLOW: CPT

## 2023-11-24 PROCEDURE — 83880 ASSAY OF NATRIURETIC PEPTIDE: CPT

## 2023-11-24 PROCEDURE — 85027 COMPLETE CBC AUTOMATED: CPT

## 2023-11-24 PROCEDURE — 83036 HEMOGLOBIN GLYCOSYLATED A1C: CPT

## 2023-11-24 PROCEDURE — 73060 X-RAY EXAM OF HUMERUS: CPT

## 2023-11-24 PROCEDURE — 87040 BLOOD CULTURE FOR BACTERIA: CPT

## 2023-11-24 PROCEDURE — 92610 EVALUATE SWALLOWING FUNCTION: CPT

## 2023-11-24 PROCEDURE — 71045 X-RAY EXAM CHEST 1 VIEW: CPT

## 2023-11-24 PROCEDURE — 85025 COMPLETE CBC W/AUTO DIFF WBC: CPT

## 2023-11-24 PROCEDURE — 81001 URINALYSIS AUTO W/SCOPE: CPT

## 2023-11-24 PROCEDURE — 80053 COMPREHEN METABOLIC PANEL: CPT

## 2023-11-24 PROCEDURE — 83550 IRON BINDING TEST: CPT

## 2023-11-24 PROCEDURE — 93005 ELECTROCARDIOGRAM TRACING: CPT

## 2023-11-24 PROCEDURE — 84145 PROCALCITONIN (PCT): CPT

## 2023-11-24 PROCEDURE — 70450 CT HEAD/BRAIN W/O DYE: CPT | Mod: MA

## 2023-11-24 PROCEDURE — 85610 PROTHROMBIN TIME: CPT

## 2023-11-24 PROCEDURE — 87640 STAPH A DNA AMP PROBE: CPT

## 2023-11-24 PROCEDURE — 0225U NFCT DS DNA&RNA 21 SARSCOV2: CPT

## 2023-11-24 PROCEDURE — 74176 CT ABD & PELVIS W/O CONTRAST: CPT | Mod: MA

## 2023-11-24 PROCEDURE — 82962 GLUCOSE BLOOD TEST: CPT

## 2023-11-24 PROCEDURE — 36415 COLL VENOUS BLD VENIPUNCTURE: CPT

## 2023-11-24 PROCEDURE — 96374 THER/PROPH/DIAG INJ IV PUSH: CPT

## 2023-11-24 PROCEDURE — 99285 EMERGENCY DEPT VISIT HI MDM: CPT | Mod: 25

## 2023-11-24 PROCEDURE — 83540 ASSAY OF IRON: CPT

## 2023-11-24 PROCEDURE — 84466 ASSAY OF TRANSFERRIN: CPT

## 2023-11-24 PROCEDURE — 83605 ASSAY OF LACTIC ACID: CPT

## 2023-11-24 PROCEDURE — 99232 SBSQ HOSP IP/OBS MODERATE 35: CPT

## 2023-11-24 RX ORDER — SODIUM CHLORIDE 9 MG/ML
1000 INJECTION, SOLUTION INTRAVENOUS
Refills: 0 | Status: DISCONTINUED | OUTPATIENT
Start: 2023-11-24 | End: 2023-11-24

## 2023-11-24 RX ADMIN — MODAFINIL 100 MILLIGRAM(S): 200 TABLET ORAL at 11:26

## 2023-11-24 RX ADMIN — Medication 25 MILLIGRAM(S): at 13:03

## 2023-11-24 RX ADMIN — APIXABAN 2.5 MILLIGRAM(S): 2.5 TABLET, FILM COATED ORAL at 06:19

## 2023-11-24 RX ADMIN — SODIUM CHLORIDE 75 MILLILITER(S): 9 INJECTION, SOLUTION INTRAVENOUS at 11:26

## 2023-11-24 RX ADMIN — Medication 25 MILLIGRAM(S): at 06:19

## 2023-11-24 RX ADMIN — Medication 200 MILLIGRAM(S): at 06:20

## 2023-11-24 RX ADMIN — PANTOPRAZOLE SODIUM 40 MILLIGRAM(S): 20 TABLET, DELAYED RELEASE ORAL at 06:19

## 2023-11-24 RX ADMIN — MIRTAZAPINE 7.5 MILLIGRAM(S): 45 TABLET, ORALLY DISINTEGRATING ORAL at 11:26

## 2023-11-24 RX ADMIN — GABAPENTIN 400 MILLIGRAM(S): 400 CAPSULE ORAL at 06:19

## 2023-11-24 RX ADMIN — Medication 240 MILLIGRAM(S): at 06:19

## 2023-11-24 RX ADMIN — MUPIROCIN 1 APPLICATION(S): 20 OINTMENT TOPICAL at 06:19

## 2023-11-24 RX ADMIN — GABAPENTIN 400 MILLIGRAM(S): 400 CAPSULE ORAL at 13:02

## 2023-11-24 NOTE — DISCHARGE NOTE PROVIDER - HOSPITAL COURSE
74 yo male with PMHx T2DM, peripheral neuropathy, HTN, HLD, A-fib (on Eliquis), TBI (s/p SDH with EVAC on 9/2021), and traumatic subdural hemorrhage in 10/2022 Covid PNA, and major depression, with [facility]9/30, presents with worsening cough and decreased mental status. Found to have RLL PNA likely aspiration. Have completed course of antibiotics. ID, Pulm, Nephro saw patient. Sodium level mildly elevated. D5W infusion given. Patient cleared by Nephro, d/w Dr. Emily handley to d/c  IVF. Patient to f/u with his doctors as out patient. D/w wife.     Total discharge time spent 50 minutes, including medication reconciliation, f/u with consultants, care coordination      72 yo male with PMHx T2DM, peripheral neuropathy, HTN, HLD, A-fib (on Eliquis), TBI (s/p SDH with EVAC on 9/2021), and traumatic subdural hemorrhage in 10/2022 Covid PNA, and major depression, with [facility]9/30, presents with worsening cough and decreased mental status. Found to have RLL PNA likely aspiration. Have completed course of antibiotics. ID, Pulm, Nephro saw patient. Sodium level mildly elevated. D5W infusion given. Patient cleared by Nephro, d/w Dr. Emily handley to d/c  IVF. Patient to f/u with his doctors as out patient. D/w wife.     Total discharge time spent 50 minutes, including medication reconciliation, f/u with consultants, care coordination

## 2023-11-24 NOTE — PROGRESS NOTE ADULT - PROBLEM SELECTOR PROBLEM 6
T2DM (type 2 diabetes mellitus)
HTN (hypertension)
T2DM (type 2 diabetes mellitus)

## 2023-11-24 NOTE — PROGRESS NOTE ADULT - ASSESSMENT
CKD Stage 2  Hypernatremia  Hypokalemia  PNA      -Stable renal function at baseline with fluctuation in Cr noted  -Hypokalemia corrected  -hypernatremia recurring  -Check urine sodium and urine osm  -Start on D5W  -PO hydration/intake encouraged  -Abx  -Daily chem; will monitor    Thank you CKD Stage 2  Hypernatremia  Hypokalemia  PNA      -Stable renal function at baseline with fluctuation in Cr noted  -Hypokalemia corrected  -hypernatremia recurring  -Check urine sodium and urine osm  -Start on D5W  -PO hydration/intake encouraged  -Abx  -Daily chem; will monitor    Renally stable for DC planning    Thank you

## 2023-11-24 NOTE — PROGRESS NOTE ADULT - PROBLEM SELECTOR PLAN 2
- pt with hx dysphagia, was on soft and bite sized diet at home  - wife reports pt has been coughing after drinking water  - CT chest with RLL PNA, likely 2/2 aspiration event  -  On easy to chew, with  moderately thick per S&S recs  - aspiration precautions  - head of bed elevated  -Hypernatremia: D/w Nephrologist Dr. Diaz. D5W infusion for few hours. Encourage oral free water intake

## 2023-11-24 NOTE — PROGRESS NOTE ADULT - ASSESSMENT
72 yo male with PMHx T2DM, peripheral neuropathy, HTN, HLD, A-fib (on Eliquis), TBI (s/p SDH with EVAC on 9/2021), and traumatic subdural hemorrhage in 10/2022 Covid PNA, and major depression, with recent admission at [facility] ICU from 9/25-9/30, presents with worsening cough and decreased mental status. Day of admission the pt had worse coughing with phelgm, febrile    WBC 20.79, Hgb 10.7, lactate 1.9, Procal 0.25, sepsis not likely. Pro BNP 1166, COVID neg. RVP neg.  CT chest, Abd/pelvis:  Right lower lobe pneumonia and small right pleural effusion. No acute intra-abdominal or pelvic findings on unenhanced CT.    RECOMMENDATIONS  1-Aspiration PNA RLL story consistent and pt is at risk so rec  S/p zosyn  Continue Vantin 200mg PO BID anticipate x5 days of antibiotics so last day 11/24  -nares MRSA (+)  -urine legionella-neg    Over the weekend Dr. Terry will be covering for our group.   If you have any questions, concerns or new micro data, please reach out to them at [phone].  Hafsa Bhardwaj M.D.  [facility] Division of Infectious Diseases [phone]  For after 5 P.M. and weekends, please call [phone]

## 2023-11-24 NOTE — PROGRESS NOTE ADULT - SUBJECTIVE AND OBJECTIVE BOX
Patient is a 73y old  Male who presents with a chief complaint of aspiration PNA (23 Nov 2023 05:06)       HPI:  74 yo male with PMHx T2DM, peripheral neuropathy, HTN, HLD, A-fib (on Eliquis), TBI (s/p SDH with EVAC on 9/2021), and traumatic subdural hemorrhage in 10/2022 Covid PNA, and major depression, with recent admission at \[facility]"" ICU from 9/25-9/30, presents with worsening cough and decreased mental status.  He was previously seen at \[facility]\"" ICU for severe metformin-induced lactic acidosis and sepsis 2/2 retrosigmoid colitis and suspected aspiration pneumonia. Metformin was discontinued at this time.  Per wife, since discharge home the pt has been doing well. He has been his normal self, at baseline patient is alert and oriented to person occasionally will answer some questions. He has been eating well. Wife tries to give him soft and bite sized food, and he coughs when he drinks water. Today the pt had worse coughing with phelgm, and the wife thought that he felt hot to the touch. She gave him Robitussin. This afternoon, he began to be less responsive than normal, so she brought him to the ED. Denies N/V/D, constipation, hematuria, rash, CP, abd pain.  ED COURSE:  Vitals: T  98 F , HR 80 , BP  156/84 , RR 18 , SpO2  95% on RA  Labs significant for: WBC 20.79, Hgb 10.7, lactate 1.9, Procal 0.25, sepsis not likely. Pro BNP 1166, COVID neg. RVP neg.  Imaging: CT head: negative  CT chest, Abd/pelvis:  Right lower lobe pneumonia and small right pleural effusion. No acute intra-abdominal or pelvic findings on unenhanced CT.  EKG: AFib, HR 88, Right superior axis deviation, incomplete RBBB  Pt received: Zosyn, solumedrol 125 mg, duoneb, 2 L NS bolus (20 Nov 2023 03:04)    Renal consulted for hypernatremia  Chart reviewed  Patient does not participate in history taking    Follow up hypernatremia  No acute events noted overnight       PAST MEDICAL & SURGICAL HISTORY:  TBI (traumatic brain injury)      HTN (hypertension)      Atrial fibrillation      Peripheral neuropathy      Major depression      HLD (hyperlipidemia)      CAD (coronary artery disease)      BPH (benign prostatic hyperplasia)      Pneumonia due to COVID-19 virus  June 2022      Hemorrhage in the brain      H/O craniotomy           FAMILY HISTORY:  FH: HTN (hypertension) (Father, Mother, Sibling)    Family history of bone cancer (Sibling)    FH: Alzheimers disease (Sibling)    NC    Social History:Non smoker    MEDICATIONS  (STANDING):  apixaban 2.5 milliGRAM(s) Oral every 12 hours  cefpodoxime 200 milliGRAM(s) Oral every 12 hours  dextrose 5%. 1000 milliLiter(s) (75 mL/Hr) IV Continuous <Continuous>  dextrose 5%. 1000 milliLiter(s) (50 mL/Hr) IV Continuous <Continuous>  dextrose 5%. 1000 milliLiter(s) (100 mL/Hr) IV Continuous <Continuous>  dextrose 50% Injectable 25 Gram(s) IV Push once  dextrose 50% Injectable 12.5 Gram(s) IV Push once  dextrose 50% Injectable 25 Gram(s) IV Push once  diltiazem    milliGRAM(s) Oral daily  gabapentin 400 milliGRAM(s) Oral three times a day  glucagon  Injectable 1 milliGRAM(s) IntraMuscular once  hydrALAZINE 25 milliGRAM(s) Oral three times a day  influenza  Vaccine (HIGH DOSE) 0.7 milliLiter(s) IntraMuscular once  insulin lispro (ADMELOG) corrective regimen sliding scale   SubCutaneous three times a day before meals  insulin lispro (ADMELOG) corrective regimen sliding scale   SubCutaneous at bedtime  latanoprost 0.005% Ophthalmic Solution 1 Drop(s) Both EYES at bedtime  mirtazapine 7.5 milliGRAM(s) Oral daily  modafinil 100 milliGRAM(s) Oral daily  mupirocin 2% Nasal 1 Application(s) Both Nostrils two times a day  pantoprazole    Tablet 40 milliGRAM(s) Oral before breakfast  senna 2 Tablet(s) Oral at bedtime    MEDICATIONS  (PRN):  dextrose Oral Gel 15 Gram(s) Oral once PRN Blood Glucose LESS THAN 70 milliGRAM(s)/deciliter      Allergies    No Known Allergies    Intolerances         REVIEW OF SYSTEMS:  Did not answer questions asked      Vital Signs Last 24 Hrs  T(C): 36.6 (24 Nov 2023 05:40), Max: 36.7 (23 Nov 2023 21:04)  T(F): 97.9 (24 Nov 2023 05:40), Max: 98.1 (23 Nov 2023 21:04)  HR: 81 (24 Nov 2023 05:40) (81 - 112)  BP: 163/90 (23 Nov 2023 21:04) (163/90 - 163/90)  BP(mean): --  RR: 19 (24 Nov 2023 05:40) (18 - 19)  SpO2: 94% (24 Nov 2023 05:40) (94% - 95%)    Parameters below as of 24 Nov 2023 05:40  Patient On (Oxygen Delivery Method): room air        PHYSICAL EXAM:    GENERAL: NAD  HEAD:  Atraumatic, Normocephalic  NERVOUS SYSTEM: lethargic  CHEST/LUNG: Clear to percussion bilaterally; No rales, rhonchi, wheezing, or rubs  HEART: Regular rate and rhythm; No murmurs, rubs, or gallops  ABDOMEN: Soft, Nontender, Nondistended; Bowel sounds present  EXTREMITIES:  No Edema    LABS:                                           10.9   8.60  )-----------( 200      ( 24 Nov 2023 06:15 )             33.6     11-24    146<H>  |  115<H>  |  31<H>  ----------------------------<  117<H>  3.8   |  25  |  1.20    Ca    8.5      24 Nov 2023 06:15  Mg     2.0     11-22    TPro  5.5<L>  /  Alb  2.7<L>  /  TBili  0.4  /  DBili  x   /  AST  13<L>  /  ALT  19  /  AlkPhos  66  11-23      Urinalysis Basic - ( 24 Nov 2023 06:15 )    Color: x / Appearance: x / SG: x / pH: x  Gluc: 117 mg/dL / Ketone: x  / Bili: x / Urobili: x   Blood: x / Protein: x / Nitrite: x   Leuk Esterase: x / RBC: x / WBC x   Sq Epi: x / Non Sq Epi: x / Bacteria: x          RADIOLOGY & ADDITIONAL TESTS:

## 2023-11-24 NOTE — PATIENT CHOICE NOTE. - NSPTCHOICESTATE_GEN_ALL_CORE

## 2023-11-24 NOTE — DISCHARGE NOTE PROVIDER - NSDCCPCAREPLAN_GEN_ALL_CORE_FT
PRINCIPAL DISCHARGE DIAGNOSIS  Diagnosis: RLL pneumonia  Assessment and Plan of Treatment: You have completed the course of antibiotics   f/u with PCP in 3- 5 days   resume home meds  Vitamind D supplement, over the counter  Recommend blood work to monitor sodium level, f/u with PCP in 3-5 days

## 2023-11-24 NOTE — PROGRESS NOTE ADULT - TIME BILLING
Note written by attending, see above.  Time spent: 52min. More than 50% of the visit was spent counseling the patient on medical condition and coordination of care
Note written by attending. Meds, labs, vitals, chart reviewed.
Note written by attending, see above.  Time spent: 60min. More than 50% of the visit was spent counseling the patient on medical condition and coordination of care
Note written by attending. Meds, labs, vitals, chart reviewed.
Note written by attending. Meds, labs, vitals, chart reviewed.

## 2023-11-24 NOTE — PROGRESS NOTE ADULT - PROBLEM SELECTOR PLAN 8
- TBI (s/p SDH with EVAC on 9/2021), and traumatic subdural hemorrhage in 10/2022   - at baseline: AAOx1 talks occasionally per wife  - CT head negative for acute pathology  - continue home modafinil    # Glaucoma  - continue latanoprost eye drops    # GERD  - continue home pantoprazole
- TBI (s/p SDH with EVAC on 9/2021), and traumatic subdural hemorrhage in 10/2022   - at baseline: AAOx1 talks occasionally per wife  - CT head negative for acute pathology  - continue home modafinil    # Glaucoma  - continue latanoprost eye drops    # GERD  - continue home pantoprazole
- chronic problem  - continue home mirtazapine
- TBI (s/p SDH with EVAC on 9/2021), and traumatic subdural hemorrhage in 10/2022   - at baseline: AAOx1 talks occasionally per wife  - CT head negative for acute pathology  - continue home modafinil    # Glaucoma  - continue latanoprost eye drops    # GERD  - continue home pantoprazole
- TBI (s/p SDH with EVAC on 9/2021), and traumatic subdural hemorrhage in 10/2022   - at baseline: AAOx1 talks occasionally per wife  - CT head negative for acute pathology  - continue home modafinil    # Glaucoma  - continue latanoprost eye drops    # GERD  - continue home pantoprazole

## 2023-11-24 NOTE — PROGRESS NOTE ADULT - PROBLEM SELECTOR PLAN 3
Normocytic Anemia  Baseline: 14 in 9/2023  Hb 10.7, MCV 82  Iron studies  Target Hb>7.0  Trend hb
Normocytic Anemia  Baseline: 14 in 9/2023  Hb 10.7, MCV 82  Iron studies  Target Hb>7.0  Trend hb
- pt with hx dysphagia, was on soft and bite sized diet at home  - wife reports pt has been coughing after drinking water  - CT chest with RLL PNA, likely 2/2 aspiration event  - start soft bite size, mildly thick per S&S recs  - aspiration precautions  - head of bed elevated
Normocytic Anemia  Baseline: 14 in 9/2023  Hb 10.7, MCV 82  Iron studies  Target Hb>7.0  Trend hb
Normocytic Anemia  Baseline: 14 in 9/2023  Hb 10.7, MCV 82  Iron studies  Target Hb>7.0  Trend hb

## 2023-11-24 NOTE — PROGRESS NOTE ADULT - PROBLEM SELECTOR PLAN 7
- chronic problem  - continue home mirtazapine
- chronic problem, not on home insulin  - previously on metformin, then had metformin induced lactic acidosis requiring ICU stay in September. Has been off of metformin since then  - not currently on any medications  - AM A1C  - LDISS  - Finger sticks, Consistent carb diet when able to tolerate diet  - continue home gabapentin 400 mg TID for neuropathic pain  - Hypoglycemic protocol
- chronic problem  - continue home mirtazapine

## 2023-11-24 NOTE — PROGRESS NOTE ADULT - PROBLEM SELECTOR PROBLEM 9
Need for prophylactic measure
TBI (traumatic brain injury)
Need for prophylactic measure

## 2023-11-24 NOTE — PROGRESS NOTE ADULT - SUBJECTIVE AND OBJECTIVE BOX
[facility], Division of Infectious Diseases  LADONNA Ramirez Y. Patel, S. Shah, [facility]  [phone]    Name: MELBA PARMAR  Age: 73y  Gender: Male  MRN: [id number]    Interval History:  Patient seen and examined at bedside  No acute overnight events. Afebrile  Notes reviewed    Antibiotics:  cefpodoxime 200 milliGRAM(s) Oral every 12 hours      Medications:  apixaban 2.5 milliGRAM(s) Oral every 12 hours  cefpodoxime 200 milliGRAM(s) Oral every 12 hours  dextrose 5%. 1000 milliLiter(s) IV Continuous <Continuous>  dextrose 5%. 1000 milliLiter(s) IV Continuous <Continuous>  dextrose 5%. 1000 milliLiter(s) IV Continuous <Continuous>  dextrose 50% Injectable 25 Gram(s) IV Push once  dextrose 50% Injectable 12.5 Gram(s) IV Push once  dextrose 50% Injectable 25 Gram(s) IV Push once  dextrose Oral Gel 15 Gram(s) Oral once PRN  diltiazem    milliGRAM(s) Oral daily  gabapentin 400 milliGRAM(s) Oral three times a day  glucagon  Injectable 1 milliGRAM(s) IntraMuscular once  hydrALAZINE 25 milliGRAM(s) Oral three times a day  influenza  Vaccine (HIGH DOSE) 0.7 milliLiter(s) IntraMuscular once  insulin lispro (ADMELOG) corrective regimen sliding scale   SubCutaneous three times a day before meals  insulin lispro (ADMELOG) corrective regimen sliding scale   SubCutaneous at bedtime  latanoprost 0.005% Ophthalmic Solution 1 Drop(s) Both EYES at bedtime  mirtazapine 7.5 milliGRAM(s) Oral daily  modafinil 100 milliGRAM(s) Oral daily  mupirocin 2% Nasal 1 Application(s) Both Nostrils two times a day  pantoprazole    Tablet 40 milliGRAM(s) Oral before breakfast  senna 2 Tablet(s) Oral at bedtime      Review of Systems:  unable to obtain    Allergies: No Known Allergies    For details regarding the patient's past medical history, social history, family history, and other miscellaneous elements, please refer the initial infectious diseases consultation and/or the admitting history and physical examination for this admission.    Objective:  Vitals:   T(C): 36.6 (11-24-23 @ 05:40), Max: 36.7 (11-23-23 @ 21:04)  HR: 81 (11-24-23 @ 05:40) (81 - 112)  BP: 163/90 (11-23-23 @ 21:04) (163/90 - 163/90)  RR: 19 (11-24-23 @ 05:40) (18 - 19)  SpO2: 94% (11-24-23 @ 05:40) (94% - 95%)    Physical Examination:  General: no acute distress  HEENT: NC/AT, EOMI,   Cardio: RRR  Resp: decreased b/l breath sounds  Abd: soft, NT, ND,  Ext: no edema or cyanosis  Skin: warm, dry, no visible rash      Laboratory Studies:  CBC:                       10.9   8.60  )-----------( 200      ( 24 Nov 2023 06:15 )             33.6     CMP: 11-24    146<H>  |  115<H>  |  31<H>  ----------------------------<  117<H>  3.8   |  25  |  1.20    Ca    8.5      24 Nov 2023 06:15    TPro  5.5<L>  /  Alb  2.7<L>  /  TBili  0.4  /  DBili  x   /  AST  13<L>  /  ALT  19  /  AlkPhos  66  11-23    LIVER FUNCTIONS - ( 23 Nov 2023 06:15 )  Alb: 2.7 g/dL / Pro: 5.5 g/dL / ALK PHOS: 66 U/L / ALT: 19 U/L / AST: 13 U/L / GGT: x           Urinalysis Basic - ( 24 Nov 2023 06:15 )    Color: x / Appearance: x / SG: x / pH: x  Gluc: 117 mg/dL / Ketone: x  / Bili: x / Urobili: x   Blood: x / Protein: x / Nitrite: x   Leuk Esterase: x / RBC: x / WBC x   Sq Epi: x / Non Sq Epi: x / Bacteria: x        Microbiology: reviewed    Culture - Urine (collected 11-21-23 @ 05:30)  Source: Clean Catch Clean Catch (Midstream)  Final Report (11-22-23 @ 11:03):    No growth    Culture - Blood (collected 11-19-23 @ 21:25)  Source: .Blood Blood-Peripheral  Preliminary Report (11-24-23 @ 07:01):    No growth at 4 days    Culture - Blood (collected 11-19-23 @ 21:20)  Source: .Blood Blood-Peripheral  Preliminary Report (11-24-23 @ 07:01):    No growth at 4 days          Radiology: reviewed

## 2023-11-24 NOTE — PROGRESS NOTE ADULT - REASON FOR ADMISSION
aspiration PNA

## 2023-11-24 NOTE — PROGRESS NOTE ADULT - PROBLEM SELECTOR PLAN 5
- chronic problem  - continue home  Cardizem  mg daily  - continue home Eliquis 2.5 mg PO BID  - continue to monitor
- chronic problem  - BP  WNL this am  - continue home hydralazine 25 mg TID and cardizem  mg daily

## 2023-11-24 NOTE — DISCHARGE NOTE PROVIDER - NSDCFUSCHEDAPPT_GEN_ALL_CORE_FT
Mirta Goldsmith  [facility] Physician [facility]  CARDIOLOGY [address] [facility]  Scheduled Appointment: 01/11/2024     Mirta Goldsmith  [facility] Physician [facility]  CARDIOLOGY [address] [facility]  Scheduled Appointment: 01/11/2024     Mirta Goldsmith  [facility] Physician [facility]  CARDIOLOGY [address] [facility]  Scheduled Appointment: 01/11/2024

## 2023-11-24 NOTE — PROGRESS NOTE ADULT - PROBLEM SELECTOR PLAN 1
- pt p/w worsening cough, per wife has been coughing after drinking water  -COVID neg. RVP neg.  - CT chest, Abd/pelvis:  Right lower lobe pneumonia and small right pleural effusion.   - S/p Zosyn, per ID to finish course with Vantin 200mg PO BID if continues to clinically improve  - +  MRSA, Negative Legionella   -Bactroban Intranasal x 5 days   - S&S recs soft and bite size, mildly thick liq  - BC NGTD   -  ID, Dr. Manning, f/u
- pt with worsening cough, per wife has been coughing after drinking water  - WBC 20.79,  lactate 1.9, Procal 0.25 on admission  - Pro BNP 1166 (BNP 1654 in 9/2023)  -COVID neg. RVP neg.  - CT chest, Abd/pelvis:  Right lower lobe pneumonia and small right pleural effusion.   - s/p Zosyn, solumedrol 125 mg, duoneb and 2  L NS bolus in ED  - continue Zosyn  - S&S recs soft and bite size, mildly thick liq  - FU BCx  - trend WBC, fever  -  ID, Dr. Manning,  recs
- pt p/w worsening cough, per wife has been coughing after drinking water. Admitted for management of aspiration pneumonia   -COVID neg. RVP neg.  - CT chest, Abd/pelvis:  Right lower lobe pneumonia and small right pleural effusion.   - S/p Zosyn, per ID to finish course with Vantin 200mg PO BID if continues to clinically improve  - +  MRSA, Negative Legionella   -Bactroban Intranasal x 5 days   - S&S recs soft and bite size, mildly thick liq  - BC NGTD   -  ID, Dr. Manning, f/u
- pt p/w worsening cough, per wife has been coughing after drinking water  - WBC 20.79,  lactate 1.9, Procal 0.25 on admission  - Pro BNP 1166 (BNP 1654 in 9/2023)  -COVID neg. RVP neg.  - CT chest, Abd/pelvis:  Right lower lobe pneumonia and small right pleural effusion.   - s/p Zosyn, solumedrol 125 mg, duoneb and 2  L NS bolus in ED  - continue Zosyn, per ID anticipate 5 days ( last day 11/24) potential to finish course with Vantin 200mg PO BID if continues to clinically improve  -f/u MRSA, Legionella   - S&S recs soft and bite size, mildly thick liq  - FU BCx  - trend WBC, fever  -  ID, Dr. Manning
- pt p/w worsening cough, per wife has been coughing after drinking water  -COVID neg. RVP neg.  - CT chest, Abd/pelvis:  Right lower lobe pneumonia and small right pleural effusion.   - continue Zosyn, per ID anticipate 5 days ( last day 11/24) potential to finish course with Vantin 200mg PO BID if continues to clinically improve  - +  MRSA, Negative Legionella   -Bactroban Intranasal x 5 days   - S&S recs soft and bite size, mildly thick liq  - BC NGTD   -  ID, Dr. Manning, f/u

## 2023-11-24 NOTE — PROGRESS NOTE ADULT - ASSESSMENT
72 yo male with PMHx T2DM, peripheral neuropathy, HTN, HLD, A-fib (on Eliquis), TBI (s/p SDH with EVAC on 9/2021), and traumatic subdural hemorrhage in 10/2022 Covid PNA, and major depression, with recent admission at [facility] from 9/25-9/30 admitted for RLL PNA management. Patient has been on tele monitor and recently noted to have 8 beats of V tach. This was the first time this happed this admission. Patient is non verbal, so unable to assess ROS, but upon discussion with his nurse, patient has not been any distress.     Afib/NSVT  - Rate-controlled Afib on tele.  No further NSVT noted  - TTE normal  - Continue Cardizem CD  - ECG 11/19/23: 88bpm, Afib, RBBB, RVH   - Should be on 5mg Eliquis BID based on weight, renal function and age.  Though has had severe SDH before so will cont low dose.     - BP stable  - Continue Hydralazine    - No evidence of volume overload  - Non-orthopneic on RA    - Monitor and replete lytes, keep K>4, Mg>2.  - Will continue to follow.

## 2023-11-24 NOTE — DISCHARGE NOTE PROVIDER - CARE PROVIDER_API CALL
Venkatesh Diaz  Nephrology  [address], [address]  [place] [address]  Phone: [phone]  Fax: [phone]  Follow Up Time:    Venkatesh Diaz  Nephrology  [address], [address]  [place] [address]  Phone: [phone]  Fax: [phone]  Follow Up Time:    Venkatesh Diaz  Nephrology  [address], [address]  [place] [address]  Phone: [phone]  Fax: [phone]  Follow Up Time:

## 2023-11-24 NOTE — PROGRESS NOTE ADULT - PROBLEM SELECTOR PROBLEM 8
Major depression
TBI (traumatic brain injury)

## 2023-11-24 NOTE — PROGRESS NOTE ADULT - PROVIDER SPECIALTY LIST ADULT
Infectious Disease
Pulmonology
Pulmonology
Cardiology
Cardiology
Hospitalist
Infectious Disease
Nephrology
Pulmonology
Infectious Disease
Nephrology
Pulmonology
Hospitalist
Internal Medicine
Hospitalist
Hospitalist

## 2023-11-24 NOTE — PROGRESS NOTE ADULT - SUBJECTIVE AND OBJECTIVE BOX
Patient is a 73y old  Male who presents with a chief complaint of aspiration PNA (24 Nov 2023 07:00)       INTERVAL HPI/OVERNIGHT EVENTS: Patient seen and examined at bedside. No new events, non verbal     MEDICATIONS  (STANDING):  apixaban 2.5 milliGRAM(s) Oral every 12 hours  cefpodoxime 200 milliGRAM(s) Oral every 12 hours  dextrose 5%. 1000 milliLiter(s) (75 mL/Hr) IV Continuous <Continuous>  dextrose 5%. 1000 milliLiter(s) (50 mL/Hr) IV Continuous <Continuous>  dextrose 5%. 1000 milliLiter(s) (100 mL/Hr) IV Continuous <Continuous>  dextrose 50% Injectable 25 Gram(s) IV Push once  dextrose 50% Injectable 12.5 Gram(s) IV Push once  dextrose 50% Injectable 25 Gram(s) IV Push once  diltiazem    milliGRAM(s) Oral daily  gabapentin 400 milliGRAM(s) Oral three times a day  glucagon  Injectable 1 milliGRAM(s) IntraMuscular once  hydrALAZINE 25 milliGRAM(s) Oral three times a day  influenza  Vaccine (HIGH DOSE) 0.7 milliLiter(s) IntraMuscular once  insulin lispro (ADMELOG) corrective regimen sliding scale   SubCutaneous three times a day before meals  insulin lispro (ADMELOG) corrective regimen sliding scale   SubCutaneous at bedtime  latanoprost 0.005% Ophthalmic Solution 1 Drop(s) Both EYES at bedtime  mirtazapine 7.5 milliGRAM(s) Oral daily  modafinil 100 milliGRAM(s) Oral daily  mupirocin 2% Nasal 1 Application(s) Both Nostrils two times a day  pantoprazole    Tablet 40 milliGRAM(s) Oral before breakfast  senna 2 Tablet(s) Oral at bedtime    MEDICATIONS  (PRN):  dextrose Oral Gel 15 Gram(s) Oral once PRN Blood Glucose LESS THAN 70 milliGRAM(s)/deciliter      Allergies    No Known Allergies    Intolerances        REVIEW OF SYSTEMS:  Unable to obtain, non verbal   Vital Signs Last 24 Hrs  T(C): 36.6 (24 Nov 2023 05:40), Max: 36.7 (23 Nov 2023 21:04)  T(F): 97.9 (24 Nov 2023 05:40), Max: 98.1 (23 Nov 2023 21:04)  HR: 81 (24 Nov 2023 05:40) (81 - 112)  BP: 163/90 (23 Nov 2023 21:04) (163/90 - 163/90)  BP(mean): --  RR: 19 (24 Nov 2023 05:40) (18 - 19)  SpO2: 94% (24 Nov 2023 05:40) (94% - 95%)    Parameters below as of 24 Nov 2023 05:40  Patient On (Oxygen Delivery Method): room air        PHYSICAL EXAM:  GENERAL: NAD, comfortable   EYES: EOMI,  conjunctiva and sclera clear  ENT: Moist mucous membranes  NECK: Supple, No JVD  CHEST/LUNG: transmitted upper airway sounds, no w/w/r, no accessory muscle use. on Room air  HEART: S1S2+, Regular rate and rhythm  ABDOMEN: Bowel sounds present; Soft, Nontender, Nondistended. No hepatomegaly  EXTREMITIES:  2+ Peripheral Pulses, brisk capillary refill. No clubbing, cyanosis  NERVOUS SYSTEM:  Awake, responsive, calm , cooperative  MSK: contracted extremities   SKIN: Warm dry, scattered dry skin lesions   LABS:                        10.9   8.60  )-----------( 200      ( 24 Nov 2023 06:15 )             33.6     24 Nov 2023 06:15    146    |  115    |  31     ----------------------------<  117    3.8     |  25     |  1.20     Ca    8.5        24 Nov 2023 06:15        CAPILLARY BLOOD GLUCOSE      POCT Blood Glucose.: 111 mg/dL (24 Nov 2023 07:49)  POCT Blood Glucose.: 123 mg/dL (23 Nov 2023 21:39)  POCT Blood Glucose.: 157 mg/dL (23 Nov 2023 16:43)  POCT Blood Glucose.: 136 mg/dL (23 Nov 2023 11:50)    BLOOD CULTURE  11-21 @ 05:30   No growth  --  --    RADIOLOGY & ADDITIONAL TESTS:    Imaging Personally Reviewed:  [ ] YES     Consultant(s) Notes Reviewed:      Care Discussed with Consultants/Other Providers:

## 2023-11-24 NOTE — CASE MANAGEMENT PROGRESS NOTE - NSCMPROGRESSNOTE_GEN_ALL_CORE
Discussed patient with Dr Pruitt this AM. Transition plan for home today. DC home today via CALE at 2pm. RONY with [facility] arranged. I s/w THANG Hutchison at [facility] who confirmed HHA is available at 2pm today and I faxed the DC to [facility] as requested to fax# [phone]. I called and spoke w/ wife Love who is aware and agrees with transition plan. Dr Pruitt also spoke with wife over telephone at this time.  CM will remain available.

## 2023-11-24 NOTE — PROGRESS NOTE ADULT - SUBJECTIVE AND OBJECTIVE BOX
Date/Time Patient Seen:  		  Referring MD:   Data Reviewed	       Patient is a 73y old  Male who presents with a chief complaint of aspiration PNA (23 Nov 2023 08:41)      Subjective/HPI     PAST MEDICAL & SURGICAL HISTORY:  TBI (traumatic brain injury)    HTN (hypertension)    Atrial fibrillation    DM (diabetes mellitus)    Peripheral neuropathy    Major depression    HLD (hyperlipidemia)    CAD (coronary artery disease)    BPH (benign prostatic hyperplasia)    Pneumonia due to COVID-19 virus  June 2022    Hemorrhage in the brain    No significant past surgical history    No significant past surgical history    H/O craniotomy          Medication list         MEDICATIONS  (STANDING):  apixaban 2.5 milliGRAM(s) Oral every 12 hours  cefpodoxime 200 milliGRAM(s) Oral every 12 hours  dextrose 5%. 1000 milliLiter(s) (50 mL/Hr) IV Continuous <Continuous>  dextrose 5%. 1000 milliLiter(s) (100 mL/Hr) IV Continuous <Continuous>  dextrose 50% Injectable 25 Gram(s) IV Push once  dextrose 50% Injectable 25 Gram(s) IV Push once  dextrose 50% Injectable 12.5 Gram(s) IV Push once  diltiazem    milliGRAM(s) Oral daily  gabapentin 400 milliGRAM(s) Oral three times a day  glucagon  Injectable 1 milliGRAM(s) IntraMuscular once  hydrALAZINE 25 milliGRAM(s) Oral three times a day  influenza  Vaccine (HIGH DOSE) 0.7 milliLiter(s) IntraMuscular once  insulin lispro (ADMELOG) corrective regimen sliding scale   SubCutaneous three times a day before meals  insulin lispro (ADMELOG) corrective regimen sliding scale   SubCutaneous at bedtime  latanoprost 0.005% Ophthalmic Solution 1 Drop(s) Both EYES at bedtime  mirtazapine 7.5 milliGRAM(s) Oral daily  modafinil 100 milliGRAM(s) Oral daily  mupirocin 2% Nasal 1 Application(s) Both Nostrils two times a day  pantoprazole    Tablet 40 milliGRAM(s) Oral before breakfast  senna 2 Tablet(s) Oral at bedtime  sodium chloride 0.9%. 1000 milliLiter(s) (75 mL/Hr) IV Continuous <Continuous>    MEDICATIONS  (PRN):  dextrose Oral Gel 15 Gram(s) Oral once PRN Blood Glucose LESS THAN 70 milliGRAM(s)/deciliter         Vitals log        ICU Vital Signs Last 24 Hrs  T(C): 36.7 (23 Nov 2023 21:04), Max: 36.8 (23 Nov 2023 05:11)  T(F): 98.1 (23 Nov 2023 21:04), Max: 98.2 (23 Nov 2023 05:11)  HR: 112 (23 Nov 2023 21:04) (71 - 112)  BP: 163/90 (23 Nov 2023 21:04) (158/94 - 163/90)  BP(mean): --  ABP: --  ABP(mean): --  RR: 18 (23 Nov 2023 21:04) (17 - 18)  SpO2: 95% (23 Nov 2023 21:04) (94% - 95%)    O2 Parameters below as of 23 Nov 2023 21:04  Patient On (Oxygen Delivery Method): room air                 Input and Output:  I&O's Detail    22 Nov 2023 07:01  -  23 Nov 2023 07:00  --------------------------------------------------------  IN:  Total IN: 0 mL    OUT:    Voided (mL): 1200 mL  Total OUT: 1200 mL    Total NET: -1200 mL          Lab Data                        10.0   7.94  )-----------( 230      ( 23 Nov 2023 06:15 )             30.2     11-23    147<H>  |  112<H>  |  24<H>  ----------------------------<  109<H>  3.7   |  28  |  0.97    Ca    8.3<L>      23 Nov 2023 06:15  Mg     2.0     11-22    TPro  5.5<L>  /  Alb  2.7<L>  /  TBili  0.4  /  DBili  x   /  AST  13<L>  /  ALT  19  /  AlkPhos  66  11-23            Review of Systems	      Objective     Physical Examination    heart s1s2  lung dc BS  head nc      Pertinent Lab findings & Imaging      Geraldo:  NO   Adequate UO     I&O's Detail    22 Nov 2023 07:01  -  23 Nov 2023 07:00  --------------------------------------------------------  IN:  Total IN: 0 mL    OUT:    Voided (mL): 1200 mL  Total OUT: 1200 mL    Total NET: -1200 mL               Discussed with:     Cultures:	        Radiology

## 2023-11-24 NOTE — PROGRESS NOTE ADULT - ASSESSMENT
74 yo male with PMHx T2DM, peripheral neuropathy, HTN, HLD, A-fib (on Eliquis), TBI (s/p SDH with EVAC on 9/2021), and traumatic subdural hemorrhage in 10/2022 Covid PNA, and major depression, with recent admission at [facility] ICU from 9/25-9/30, presents with worsening cough and decreased mental status.  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 Metformin was discontinued at this time.    pna  ams  frailty  weakness  DM  neuropathy  HLD  HTN  TBI hx   SDH hx  Depression    PO ABX  VS noted  labs reviewed    SLP eval done  emp ABX  HOB elev  asp prec  oral hygiene  assist with needs  monitor mentation  DM care  serial FS  old records reviewed  TTE reviewed - grossly int  prognosis guarded  GOC - pt is DNR

## 2023-11-24 NOTE — PROGRESS NOTE ADULT - PROBLEM SELECTOR PLAN 9
- TBI (s/p SDH with EVAC on 9/2021), and traumatic subdural hemorrhage in 10/2022   - at baseline: AAOx1 talks occasionally per wife  - CT head negative for acute pathology  - continue home modafinil    # Glaucoma  - continue latanoprost eye drops    # GERD  - continue home pantoprazole
- on eliquis for AFib    # GOC  - Pt is DNR with trial of intubation, confirmed by wife  - wife will bring in MOLST from home

## 2023-11-24 NOTE — DISCHARGE NOTE PROVIDER - PROVIDER TOKENS
PROVIDER:[TOKEN:[[id number]]] PROVIDER:[TOKEN:[[id number]]] PROVIDER:[TOKEN:[[id number]]]

## 2023-11-24 NOTE — CAREGIVER ENGAGEMENT NOTE - CAREGIVER OUTREACH NOTES - FREE TEXT
I called and spoke w/ wife Love who was planning on having  return home today. Wife is aware and agrees with transition plan to home today. Wife requested 2pm CALE as pt's HHA has been confirmed for 2pm resumption today. Wife aware I confirmed HHA resumption with [facility] and [facility] for V[facility] referral sent as she requested. CM contact info provided to wife for any questions or concerns. Floor RN aware of DC plan.

## 2023-11-24 NOTE — DISCHARGE NOTE NURSING/CASE MANAGEMENT/SOCIAL WORK - NSDCPEFALRISK_GEN_ALL_CORE
For information on Fall & Injury Prevention, visit: [email or web address] OR  [email or web address] OR  https://www.cdc.gov/steadi/patient.html

## 2023-11-24 NOTE — DISCHARGE NOTE NURSING/CASE MANAGEMENT/SOCIAL WORK - PATIENT PORTAL LINK FT
You can access the FollowMyHealth Patient Portal offered by [facility] by registering at the following website: [email or web address]. By joining [email or web address]’s FollowMyHealth portal, you will also be able to view your health information using other applications (apps) compatible with our system.

## 2023-11-24 NOTE — PROGRESS NOTE ADULT - SUBJECTIVE AND OBJECTIVE BOX
[facility] -- Janak Edwards, Alexey العراقي Pannella, Patel, Savella  Office # [phone]      Follow Up:  WCT    Subjective/Observations: Patient seen and examined. Events noted. Resting comfortably in bed.  Was not responsive to my questioning. Unable to provide meaningful information.       REVIEW OF SYSTEMS: Limited 2/2 comorbidities     PAST MEDICAL & SURGICAL HISTORY:  TBI (traumatic brain injury)      HTN (hypertension)      Atrial fibrillation      Peripheral neuropathy      Major depression      HLD (hyperlipidemia)      CAD (coronary artery disease)      BPH (benign prostatic hyperplasia)      Pneumonia due to COVID-19 virus  June 2022      Hemorrhage in the brain      H/O craniotomy          MEDICATIONS  (STANDING):  apixaban 2.5 milliGRAM(s) Oral every 12 hours  cefpodoxime 200 milliGRAM(s) Oral every 12 hours  dextrose 5%. 1000 milliLiter(s) (75 mL/Hr) IV Continuous <Continuous>  dextrose 5%. 1000 milliLiter(s) (100 mL/Hr) IV Continuous <Continuous>  dextrose 5%. 1000 milliLiter(s) (50 mL/Hr) IV Continuous <Continuous>  dextrose 50% Injectable 25 Gram(s) IV Push once  dextrose 50% Injectable 12.5 Gram(s) IV Push once  dextrose 50% Injectable 25 Gram(s) IV Push once  diltiazem    milliGRAM(s) Oral daily  gabapentin 400 milliGRAM(s) Oral three times a day  glucagon  Injectable 1 milliGRAM(s) IntraMuscular once  hydrALAZINE 25 milliGRAM(s) Oral three times a day  influenza  Vaccine (HIGH DOSE) 0.7 milliLiter(s) IntraMuscular once  insulin lispro (ADMELOG) corrective regimen sliding scale   SubCutaneous three times a day before meals  insulin lispro (ADMELOG) corrective regimen sliding scale   SubCutaneous at bedtime  latanoprost 0.005% Ophthalmic Solution 1 Drop(s) Both EYES at bedtime  mirtazapine 7.5 milliGRAM(s) Oral daily  modafinil 100 milliGRAM(s) Oral daily  mupirocin 2% Nasal 1 Application(s) Both Nostrils two times a day  pantoprazole    Tablet 40 milliGRAM(s) Oral before breakfast  senna 2 Tablet(s) Oral at bedtime    MEDICATIONS  (PRN):  dextrose Oral Gel 15 Gram(s) Oral once PRN Blood Glucose LESS THAN 70 milliGRAM(s)/deciliter      Allergies    No Known Allergies    Intolerances            Vital Signs Last 24 Hrs  T(C): 36.9 (24 Nov 2023 12:47), Max: 36.9 (24 Nov 2023 12:47)  T(F): 98.4 (24 Nov 2023 12:47), Max: 98.4 (24 Nov 2023 12:47)  HR: 75 (24 Nov 2023 12:47) (75 - 112)  BP: 117/77 (24 Nov 2023 12:47) (117/77 - 163/90)  BP(mean): --  RR: 18 (24 Nov 2023 12:47) (18 - 19)  SpO2: 96% (24 Nov 2023 12:47) (94% - 96%)    Parameters below as of 24 Nov 2023 12:47  Patient On (Oxygen Delivery Method): room air        I&O's Summary    24 Nov 2023 07:01  -  24 Nov 2023 16:20  --------------------------------------------------------  IN: 0 mL / OUT: 250 mL / NET: -250 mL          PHYSICAL EXAM:  TELE: AF  Constitutional: NAD, awake   HEENT: Dry Mucous Membranes, Anicteric  Pulmonary: Decreased breath sounds b/l. No rales, crackles or wheeze appreciated.   Cardiovascular: IRRR, S1 and S2, No murmurs, rubs, gallops or clicks  Gastrointestinal: Bowel Sounds present, soft, nontender.   Lymph: No peripheral edema. No lymphadenopathy.  Skin: No visible rashes or ulcers.  Psych:  unable to assess     LABS: All Labs Reviewed:                        10.9   8.60  )-----------( 200      ( 24 Nov 2023 06:15 )             33.6                         10.0   7.94  )-----------( 230      ( 23 Nov 2023 06:15 )             30.2                         9.0    9.18  )-----------( 212      ( 22 Nov 2023 08:15 )             28.0     24 Nov 2023 06:15    146    |  115    |  31     ----------------------------<  117    3.8     |  25     |  1.20   23 Nov 2023 06:15    147    |  112    |  24     ----------------------------<  109    3.7     |  28     |  0.97   22 Nov 2023 15:53    145    |  110    |  26     ----------------------------<  119    3.9     |  26     |  0.99     Ca    8.5        24 Nov 2023 06:15  Ca    8.3        23 Nov 2023 06:15  Ca    8.0        22 Nov 2023 15:53  Mg     2.0       22 Nov 2023 08:15    TPro  5.5    /  Alb  2.7    /  TBili  0.4    /  DBili  x      /  AST  13     /  ALT  19     /  AlkPhos  66     23 Nov 2023 06:15  TPro  5.4    /  Alb  2.7    /  TBili  0.4    /  DBili  x      /  AST  8      /  ALT  10     /  AlkPhos  62     22 Nov 2023 08:15        - Troponin:

## 2023-11-24 NOTE — DISCHARGE NOTE PROVIDER - NSDCMRMEDTOKEN_GEN_ALL_CORE_FT
apixaban 2.5 mg oral tablet: 1 tab(s) orally every 12 hours  Cardizem  mg/24 hours oral capsule, extended release: 1 cap(s) orally once a day  cholecalciferol 10 mcg/0.25 mL (400 intl units/0.25 mL) oral liquid: 0.25 milliliter(s) orally once a day  gabapentin 400 mg oral capsule: 1 cap(s) orally 3 times a day  hydrALAZINE 25 mg oral tablet: 1 tab(s) orally 3 times a day  latanoprost 0.005% ophthalmic solution: 1 drop(s) to each affected eye once a day (at bedtime)  mirtazapine 7.5 mg oral tablet: 1 tab(s) orally once a day  modafinil 100 mg oral tablet: 1 tab(s) orally once a day  pantoprazole 40 mg oral delayed release tablet: 1 tab(s) orally once a day (before a meal)

## 2023-11-24 NOTE — PROGRESS NOTE ADULT - PROBLEM SELECTOR PLAN 4
- chronic   - continue home  Cardizem  mg daily  - continue home Eliquis 2.5 mg PO BID  - continue to monitor
Normocytic Anemia  Baseline: 14 in 9/2023  Hb 10.7, MCV 82  Iron studies  Target Hb>7.0  Trend hb
- chronic   - continue home  Cardizem  mg daily  - continue home Eliquis 2.5 mg PO BID  - continue to monitor

## 2023-11-24 NOTE — PROGRESS NOTE ADULT - PROBLEM SELECTOR PLAN 6
- chronic problem, not on home insulin  - previously on metformin, then had metformin induced lactic acidosis requiring ICU stay in September. Has been off of metformin since then  - not currently on any medications  - A1C 5.5  - LDISS  - Finger sticks, Consistent carb diet when able to tolerate diet  - continue home gabapentin 400 mg TID for neuropathic pain  - Hypoglycemic protocol
- chronic problem  - /84  - continue home hydralazine 25 mg TID and cardizem  mg daily
- chronic problem, not on home insulin  - previously on metformin, then had metformin induced lactic acidosis requiring ICU stay in September. Has been off of metformin since then  - not currently on any medications  - A1C  - LDISS  - Finger sticks, Consistent carb diet when able to tolerate diet  - continue home gabapentin 400 mg TID for neuropathic pain  - Hypoglycemic protocol
- chronic problem, not on home insulin  - previously on metformin, then had metformin induced lactic acidosis requiring ICU stay in September. Has been off of metformin since then  - not currently on any medications  - A1C 5.5  - LDISS  - Finger sticks, Consistent carb diet when able to tolerate diet  - continue home gabapentin 400 mg TID for neuropathic pain  - Hypoglycemic protocol
- chronic problem, not on home insulin  - previously on metformin, then had metformin induced lactic acidosis requiring ICU stay in September. Has been off of metformin since then  - not currently on any medications  - A1C 5.5  - LDISS  - Finger sticks, Consistent carb diet when able to tolerate diet  - continue home gabapentin 400 mg TID for neuropathic pain  - Hypoglycemic protocol

## 2023-11-25 LAB
CULTURE RESULTS: SIGNIFICANT CHANGE UP
SPECIMEN SOURCE: SIGNIFICANT CHANGE UP

## 2023-11-27 NOTE — CHART NOTE - NSCHARTNOTEFT_GEN_A_CORE
74 yo male with PMHx T2DM, peripheral neuropathy, HTN, HLD, A-fib (on Eliquis), TBI (s/p SDH with EVAC on 9/2021), and traumatic subdural hemorrhage in 10/2022 Covid PNA, and major depression, with recent admission at [facility] ICU from 9/25-9/30, presents with worsening cough and decreased mental status. Found to have RLL PNA likely aspiration. Patient is followed by ID.     Called by telemetry this am, patient with 8 beats of Vtach, asymptomatic. Patient in NAD. Outpatient cardiologist Dr. Goldsmith. Plan below reviewed with Dr. Pruitt.       PLAN:     - continue telemetry  - check labs  - repleted potassium 40 mEq oral x1, pt did not tolerate taste   - ordered potassium 3.3, Potassium 10 mEq IV x3  - will repeat BMP at 1600 (ordered)  - magnesium 2, will check in am   - labs for am to include BMP, Mag (ordered)  - cardiology consult with Grace yusuf pending
Got a message from nurse that patient's wife called today and wanted to speak to a doctor about one of the discharge medications. I called, left Voicemail
Called by RN for pt refusing AM PO medications (Cardizem, Eliquis, Gabapentin, Hydralazine), clenching mouth  Pt has history of TBI, this is a recurrent issue when awoken for medications  Called wife Mony, who will be in today 8-9AM, has more success getting pt to take meds  VSS  Will postpone these until her arrival at this time

## 2024-01-11 ENCOUNTER — APPOINTMENT (OUTPATIENT)
Dept: CARDIOLOGY | Facility: CLINIC | Age: 74
End: 2024-01-11
Payer: COMMERCIAL

## 2024-01-11 DIAGNOSIS — I82.409 ACUTE EMBOLISM AND THROMBOSIS OF UNSPECIFIED DEEP VEINS OF UNSPECIFIED LOWER EXTREMITY: ICD-10-CM

## 2024-01-11 PROCEDURE — 99214 OFFICE O/P EST MOD 30 MIN: CPT | Mod: 95

## 2024-01-11 NOTE — CARDIOLOGY SUMMARY
[de-identified] : poor baseline Atrial  fibrillation   LAFB low voltage.  nonspecific T wave changes  [de-identified] : 7/02/20 normal LV function mild LVH  6/2022 normal LV function  6/2203 normal LV function

## 2024-01-11 NOTE — HISTORY OF PRESENT ILLNESS
[FreeTextEntry1] : 73 year old man with a history of atrial fibrillation, SDH HTN, HLD, allergic conjunctivitis, neuropathy, SDH s/p mechanical fall, recurrent ICH when on AC.  He was readmitted to [facility] to  ICU in 9/2023 for severe sepsis likely 2/2 rectosigmoid colitis and suspected aspiration pneumonia from vomiting, resulting in AFib with RVR and anion gap metabolic acidosis. His colitis and PNA were treated with Zosyn. He was taken off of the Metformin secondary to the lactic acidosis. He was placed back on Eliquis 2.5mg q12.      He is now here for a followup.    Since his last visit, Readmitted to [facility] in 11/2023 RLL PNA. I personally reviewed all of the hospital records available to me at this time, which included but are not limited to the discharge summary, labs and imaging reports.    His has been sleeping a lot. He is reportedly eating ok per his wife.  He has been doing OT/PT at the house. He is speaking. He denies any chest pain. He denies palpitations, dyspnea, PND, orthopnea, lower extremity edema, LOC.  No reported melena, hematochezia or hematemesis.  His BPs are 120s/60s. HR 70s.  ROS is limited.

## 2024-01-11 NOTE — DISCUSSION/SUMMARY
[FreeTextEntry1] : 73 year man with a history as listed presents for a followup cardiac evaluation.   Rex is s/p recently hospitalization for aspiration PNA. I personally reviewed all of the hospital records available to me at this time, which included but are not limited to the discharge summary, labs and imaging reports.  His blood pressure is controlled.  He will continue Hydralazine 25mg q8. He is off of losartan given his LATOYA.  His AF is relatively controlled. He will continue Cardizem 240mg Qday  His Eliquis 2.5mg q12 is back on. He is on low dose 2/2 spontaneous SDH but he has had life threatening VTE.  Hopefully he does not has recurrent SDH..  Exercise, diet and lifestyle modification counseling was performed to reduce his CV risk.    He will follow with me in 3 months. he will follow up with you fall of his other medical issues.

## 2024-01-26 RX ORDER — HYDRALAZINE HYDROCHLORIDE 25 MG/1
25 TABLET ORAL
Qty: 270 | Refills: 3 | Status: ACTIVE | COMMUNITY
Start: 2019-02-21

## 2024-01-26 RX ORDER — DILTIAZEM HYDROCHLORIDE 240 MG/1
240 CAPSULE, EXTENDED RELEASE ORAL
Qty: 90 | Refills: 3 | Status: ACTIVE | COMMUNITY

## 2024-01-26 RX ORDER — APIXABAN 2.5 MG/1
2.5 TABLET, FILM COATED ORAL
Qty: 180 | Refills: 2 | Status: ACTIVE | COMMUNITY
Start: 2022-12-08

## 2024-02-12 NOTE — DIETITIAN INITIAL EVALUATION ADULT - HEIGHT FOR BMI (CENTIMETERS)
Pharmacy faxed in a request for prior authorization on:    Medication: vyvanse   Dosage: 10mg    Quantity requested:  #30 for 30 days  Pharmacy for prescription has been selected.    Initiation of prior authorization needed.    
Vyvanse Pending    Insurance response  Prescription Drug Insurance: [employer]  Notes: Prior authorization submitted - will update provider when decision has been made by insurance.       
vyvanse Approved    Filling [facility]acy: [facility]    
[phone]

## 2024-03-08 NOTE — ED PROVIDER NOTE - NS ED MD TWO NIGHTS YN
Yes
#ACS r/o  - Trend top/ekg q6hr  - Telemetry   - echocardiogram  - A1c, Lipid, TSH  - D-dimer negative  - Dr Sevilla notified in ED - said ok to admit   - Cardiology consult   - c/w aspirin     #DM  - Patient states she takes glargine 40 units at night  - Currently sugar level 182   - Will hold off on long acting insulin tonight  - AISS  - Resume glargine as tolerated  - Home insulin pump turned off    Full  DM diet  lovenox

## 2024-03-28 NOTE — ED PROVIDER NOTE - WR ORDER STATUS 1
Type of Form Received:     Form Received (Date) 3/28/24   Form Filled out Yes, faxed 4/1   Placed in provider folder Yes     
Performed
Glycopyrrolate Pregnancy And Lactation Text: This medication is Pregnancy Category B and is considered safe during pregnancy. It is unknown if it is excreted breast milk.

## 2024-04-22 NOTE — DISCHARGE NOTE NURSING/CASE MANAGEMENT/SOCIAL WORK - NSTOBACCONEVERSMOKERY/N_GEN_A
No protocol for requested medication.    Medication:    Disp Refills Start End    gabapentin (NEURONTIN) 600 MG tablet 90 tablet 0 2/21/2024 --    Sig: TAKE 1 TABLET BY MOUTH IN THE MORNING AT NOON AND IN THE EVENING      Last office visit date: 11/2/23-Shoulder pain.  Next OV scheduled, none.  Pt connected to call center to schedule next MWV.      Pharmacy: [facility] #[id number] - [place] - [address]    Order pended, routed to clinician for review.     No

## 2024-05-14 NOTE — PHYSICAL THERAPY INITIAL EVALUATION ADULT - PREDICTED DURATION OF THERAPY (DAYS/WKS), PT EVAL
Pt at baseline functionally. Pt is wheelchair bound at baseline and non-ambulatory. Pt to be discharged from acute PT services at this time, please re-consult if appropriate show

## 2024-05-22 NOTE — PROGRESS NOTE ADULT - SUBJECTIVE AND OBJECTIVE BOX
Contact Information: If you have any questions, concerns, pended studies, tests and/or procedures, or emergencies regarding your inpatient behavioral health visit  Please contact [facility] [phone] and ask to speak to a , nurse or physician  A contact is available 24 hours/ 7 days a week at this number  Summary of Procedures Performed During your Stay:  Below is a list of major procedures performed during your hospital stay and a summary of results:  - No major procedures performed  Pending Studies (From admission, onward)    None        If studies are pending at discharge, follow up with your PCP and/or referring provider  Neurology Follow up note    MELBA [id number]    HPI:  72M BPH CAD, HTN, HLD, Afib, SDH s/p mechanical fall, recurrent ICH when on AC brought today for weakness. History obtained from pt's wife Love and chart review given patient medical condition. Pt admitted from 10/19 to 10/22/2022 at [facility] for SHD s/p adexxa. Pt's wife endorses that patient has been progressively deteriorating, states that now only is reactive to painful stimulus, prior used to articulate some words. Patient completed 2 weeks of outpatient PT, has 8-10 hours nurse aid daily. Temp at home 99.9. Wife assist with feeding, was seen by neuro who start mirtazapine and sent for neurologic work up.     ED course  VS: Afebrile, HR: 96, BP: 154/97, SpO2: 97% RA RR 19  Labs significant for glucose 227, Alkaline phosphate 227, UA shows glucose 250  CT head showed chronic subdural hematomas overlying the left side of the posterior falx measuring up to 1.7 cm and 1.5 cm overlying the left temporal convexity.  Mild mass effect with sulcal effacement and partial compression of the  left lateral ventricle. No new acute intracranial hemorrhage.  EKG shows Afib with RVR @ 110 bpm, Right axis deviation    (2022 01:11)      Interval History -more responsive today    Patient is seen, chart was reviewed and case was discussed with the treatment team.  Pt is not in any distress.   Lying on bed comfortably.       Vital Signs Last 24 Hrs  T(C): 37.1 (2022 12:50), Max: 37.4 (2022 20:17)  T(F): 98.8 (2022 12:50), Max: 99.3 (2022 20:17)  HR: 106 (2022 12:50) (90 - 110)  BP: 114/79 (2022 12:50) (104/69 - 116/66)  BP(mean): --  RR: 18 (2022 12:50) (18 - 20)  SpO2: 97% (2022 12:50) (93% - 97%)    Parameters below as of 2022 12:50  Patient On (Oxygen Delivery Method): nasal cannula  O2 Flow (L/min): 2            REVIEW OF SYSTEMS:    unobtainable 2/2 poor mentation  not in any distress    On Neurological Examination:    Mental Status - Pt is unresponsive to verbal stimuli   moaning and grimacing with tactile stimulation       Speech -  Non verbal    Cranial Nerves - Pupils 3 mm equal and reactive to light, extraocular eye movements intact.   Pt has no facial asymmetry. Facial sensation is intact.Tongue - is in midline.    Muscle tone -increased    Motor Exam - 2/5    Sensory Exam -  Pt withdraws all extremities equally on stimulation.      Deep tendon Reflexes - 2 plus all over.    Neck Supple -  Yes.     MEDICATIONS    acetaminophen     Tablet .. 650 milliGRAM(s) Oral every 6 hours PRN  aluminum hydroxide/magnesium hydroxide/simethicone Suspension 30 milliLiter(s) Oral every 4 hours PRN  atorvastatin 40 milliGRAM(s) Oral at bedtime  dextrose 5% + sodium chloride 0.45%. 1000 milliLiter(s) IV Continuous <Continuous>  dextrose 5%. 1000 milliLiter(s) IV Continuous <Continuous>  dextrose 5%. 1000 milliLiter(s) IV Continuous <Continuous>  dextrose 50% Injectable 25 Gram(s) IV Push once  dextrose 50% Injectable 12.5 Gram(s) IV Push once  dextrose 50% Injectable 25 Gram(s) IV Push once  dextrose Oral Gel 15 Gram(s) Oral once PRN  diltiazem    milliGRAM(s) Oral daily  gabapentin 400 milliGRAM(s) Oral three times a day  glucagon  Injectable 1 milliGRAM(s) IntraMuscular once  hydrALAZINE 50 milliGRAM(s) Oral every 8 hours  insulin lispro (ADMELOG) corrective regimen sliding scale   SubCutaneous every 6 hours  latanoprost 0.005% Ophthalmic Solution 1 Drop(s) Both EYES at bedtime  losartan 50 milliGRAM(s) Oral two times a day  methylphenidate 20 milliGRAM(s) Oral daily  methylphenidate 10 milliGRAM(s) Oral daily  metoprolol tartrate Injectable 5 milliGRAM(s) IV Push every 6 hours  mirtazapine 7.5 milliGRAM(s) Oral daily  ondansetron Injectable 4 milliGRAM(s) IV Push every 8 hours PRN  sodium chloride 0.9%. 1000 milliLiter(s) IV Continuous <Continuous>      Allergies    No Known Allergies    Intolerances        LABS:  CBC Full  -  ( 2022 06:37 )  WBC Count : 25.02 K/uL  RBC Count : 4.65 M/uL  Hemoglobin : 12.2 g/dL  Hematocrit : 36.7 %  Platelet Count - Automated : 247 K/uL  Mean Cell Volume : 78.9 fl  Mean Cell Hemoglobin : 26.2 pg  Mean Cell Hemoglobin Concentration : 33.2 gm/dL  x    Urinalysis Basic - ( 2022 22:10 )    Color: Yellow / Appearance: Clear / S.015 / pH: x  Gluc: x / Ketone: Negative  / Bili: Negative / Urobili: Negative   Blood: x / Protein: 30 mg/dL / Nitrite: Negative   Leuk Esterase: Negative / RBC: 0-2 /HPF / WBC 0-2   Sq Epi: x / Non Sq Epi: Few / Bacteria: Few      11-24    143  |  110<H>  |  10  ----------------------------<  184<H>  3.2<L>   |  24  |  0.97    Ca    8.3<L>      2022 06:37  Phos  2.3     11-24  Mg     1.8     11-24    TPro  5.2<L>  /  Alb  2.3<L>  /  TBili  0.7  /  DBili  x   /  AST  11<L>  /  ALT  15  /  AlkPhos  87  11-24    Hemoglobin A1C:     Vitamin B12     RADIOLOGY    ASSESSMENT AND PLAN:    seen for ams-multifactorial     ME  TBI/SDH  FEVER    brain mri without to r/o cva  antibiotic as per ID  Physical therapy evaluation.  OOB to chair/ambulation with assistance only  Pain is accessed and addressed.  Would continue to follow.               Fellow Resident Fellow Resident Resident Fellow Resident Resident Resident

## 2024-05-29 ENCOUNTER — INPATIENT (INPATIENT)
Facility: HOSPITAL | Age: 74
LOS: 3 days | Discharge: HOME | DRG: 871 | End: 2024-06-02
Attending: INTERNAL MEDICINE | Admitting: STUDENT IN AN ORGANIZED HEALTH CARE EDUCATION/TRAINING PROGRAM
Payer: COMMERCIAL

## 2024-05-29 VITALS
HEIGHT: 65 IN | WEIGHT: 134.92 LBS | OXYGEN SATURATION: 94 % | TEMPERATURE: 102 F | HEART RATE: 105 BPM | DIASTOLIC BLOOD PRESSURE: 104 MMHG | RESPIRATION RATE: 16 BRPM | SYSTOLIC BLOOD PRESSURE: 162 MMHG

## 2024-05-29 DIAGNOSIS — G62.9 POLYNEUROPATHY, UNSPECIFIED: ICD-10-CM

## 2024-05-29 DIAGNOSIS — D64.9 ANEMIA, UNSPECIFIED: ICD-10-CM

## 2024-05-29 DIAGNOSIS — J18.9 PNEUMONIA, UNSPECIFIED ORGANISM: ICD-10-CM

## 2024-05-29 DIAGNOSIS — H40.9 UNSPECIFIED GLAUCOMA: ICD-10-CM

## 2024-05-29 DIAGNOSIS — E11.9 TYPE 2 DIABETES MELLITUS WITHOUT COMPLICATIONS: ICD-10-CM

## 2024-05-29 DIAGNOSIS — I10 ESSENTIAL (PRIMARY) HYPERTENSION: ICD-10-CM

## 2024-05-29 DIAGNOSIS — E78.5 HYPERLIPIDEMIA, UNSPECIFIED: ICD-10-CM

## 2024-05-29 DIAGNOSIS — Z98.890 OTHER SPECIFIED POSTPROCEDURAL STATES: Chronic | ICD-10-CM

## 2024-05-29 DIAGNOSIS — I48.91 UNSPECIFIED ATRIAL FIBRILLATION: ICD-10-CM

## 2024-05-29 DIAGNOSIS — N40.0 BENIGN PROSTATIC HYPERPLASIA WITHOUT LOWER URINARY TRACT SYMPTOMS: ICD-10-CM

## 2024-05-29 DIAGNOSIS — Z29.9 ENCOUNTER FOR PROPHYLACTIC MEASURES, UNSPECIFIED: ICD-10-CM

## 2024-05-29 DIAGNOSIS — J69.0 PNEUMONITIS DUE TO INHALATION OF FOOD AND VOMIT: ICD-10-CM

## 2024-05-29 DIAGNOSIS — S06.9X9A UNSPECIFIED INTRACRANIAL INJURY WITH LOSS OF CONSCIOUSNESS OF UNSPECIFIED DURATION, INITIAL ENCOUNTER: ICD-10-CM

## 2024-05-29 DIAGNOSIS — K59.00 CONSTIPATION, UNSPECIFIED: ICD-10-CM

## 2024-05-29 DIAGNOSIS — I25.10 ATHEROSCLEROTIC HEART DISEASE OF NATIVE CORONARY ARTERY WITHOUT ANGINA PECTORIS: ICD-10-CM

## 2024-05-29 DIAGNOSIS — A41.9 SEPSIS, UNSPECIFIED ORGANISM: ICD-10-CM

## 2024-05-29 DIAGNOSIS — F32.9 MAJOR DEPRESSIVE DISORDER, SINGLE EPISODE, UNSPECIFIED: ICD-10-CM

## 2024-05-29 LAB
ALBUMIN SERPL ELPH-MCNC: 3.4 G/DL — SIGNIFICANT CHANGE UP (ref 3.3–5)
ALP SERPL-CCNC: 95 U/L — SIGNIFICANT CHANGE UP (ref 40–120)
ALT FLD-CCNC: 16 U/L — SIGNIFICANT CHANGE UP (ref 12–78)
ANION GAP SERPL CALC-SCNC: 7 MMOL/L — SIGNIFICANT CHANGE UP (ref 5–17)
APPEARANCE UR: CLEAR — SIGNIFICANT CHANGE UP
APTT BLD: 34.4 SEC — SIGNIFICANT CHANGE UP (ref 24.5–35.6)
AST SERPL-CCNC: 18 U/L — SIGNIFICANT CHANGE UP (ref 15–37)
BASOPHILS # BLD AUTO: 0.06 K/UL — SIGNIFICANT CHANGE UP (ref 0–0.2)
BASOPHILS NFR BLD AUTO: 0.4 % — SIGNIFICANT CHANGE UP (ref 0–2)
BILIRUB SERPL-MCNC: 0.5 MG/DL — SIGNIFICANT CHANGE UP (ref 0.2–1.2)
BILIRUB UR-MCNC: NEGATIVE — SIGNIFICANT CHANGE UP
BUN SERPL-MCNC: 14 MG/DL — SIGNIFICANT CHANGE UP (ref 7–23)
CALCIUM SERPL-MCNC: 8.9 MG/DL — SIGNIFICANT CHANGE UP (ref 8.5–10.1)
CHLORIDE SERPL-SCNC: 103 MMOL/L — SIGNIFICANT CHANGE UP (ref 96–108)
CO2 SERPL-SCNC: 27 MMOL/L — SIGNIFICANT CHANGE UP (ref 22–31)
COLOR SPEC: YELLOW — SIGNIFICANT CHANGE UP
CREAT SERPL-MCNC: 1.1 MG/DL — SIGNIFICANT CHANGE UP (ref 0.5–1.3)
DIFF PNL FLD: NEGATIVE — SIGNIFICANT CHANGE UP
EGFR: 71 ML/MIN/1.73M2 — SIGNIFICANT CHANGE UP
EOSINOPHIL # BLD AUTO: 0.03 K/UL — SIGNIFICANT CHANGE UP (ref 0–0.5)
EOSINOPHIL NFR BLD AUTO: 0.2 % — SIGNIFICANT CHANGE UP (ref 0–6)
GLUCOSE SERPL-MCNC: 121 MG/DL — HIGH (ref 70–99)
GLUCOSE UR QL: NEGATIVE MG/DL — SIGNIFICANT CHANGE UP
HCT VFR BLD CALC: 35.4 % — LOW (ref 39–50)
HGB BLD-MCNC: 11.6 G/DL — LOW (ref 13–17)
IMM GRANULOCYTES NFR BLD AUTO: 0.7 % — SIGNIFICANT CHANGE UP (ref 0–0.9)
INR BLD: 1.13 RATIO — SIGNIFICANT CHANGE UP (ref 0.85–1.18)
KETONES UR-MCNC: NEGATIVE MG/DL — SIGNIFICANT CHANGE UP
LACTATE SERPL-SCNC: 1.6 MMOL/L — SIGNIFICANT CHANGE UP (ref 0.7–2)
LEUKOCYTE ESTERASE UR-ACNC: NEGATIVE — SIGNIFICANT CHANGE UP
LYMPHOCYTES # BLD AUTO: 0.61 K/UL — LOW (ref 1–3.3)
LYMPHOCYTES # BLD AUTO: 4.1 % — LOW (ref 13–44)
MCHC RBC-ENTMCNC: 25.2 PG — LOW (ref 27–34)
MCHC RBC-ENTMCNC: 32.8 GM/DL — SIGNIFICANT CHANGE UP (ref 32–36)
MCV RBC AUTO: 76.8 FL — LOW (ref 80–100)
MONOCYTES # BLD AUTO: 0.93 K/UL — HIGH (ref 0–0.9)
MONOCYTES NFR BLD AUTO: 6.2 % — SIGNIFICANT CHANGE UP (ref 2–14)
NEUTROPHILS # BLD AUTO: 13.22 K/UL — HIGH (ref 1.8–7.4)
NEUTROPHILS NFR BLD AUTO: 88.4 % — HIGH (ref 43–77)
NITRITE UR-MCNC: NEGATIVE — SIGNIFICANT CHANGE UP
NRBC # BLD: 0 /100 WBCS — SIGNIFICANT CHANGE UP (ref 0–0)
PH UR: 7 — SIGNIFICANT CHANGE UP (ref 5–8)
PLATELET # BLD AUTO: 205 K/UL — SIGNIFICANT CHANGE UP (ref 150–400)
POTASSIUM SERPL-MCNC: 3.9 MMOL/L — SIGNIFICANT CHANGE UP (ref 3.5–5.3)
POTASSIUM SERPL-SCNC: 3.9 MMOL/L — SIGNIFICANT CHANGE UP (ref 3.5–5.3)
PROT SERPL-MCNC: 7.1 G/DL — SIGNIFICANT CHANGE UP (ref 6–8.3)
PROT UR-MCNC: 100 MG/DL
PROTHROM AB SERPL-ACNC: 13.2 SEC — HIGH (ref 9.5–13)
RAPID RVP RESULT: DETECTED
RBC # BLD: 4.61 M/UL — SIGNIFICANT CHANGE UP (ref 4.2–5.8)
RBC # FLD: 16.6 % — HIGH (ref 10.3–14.5)
RV+EV RNA SPEC QL NAA+PROBE: DETECTED
SARS-COV-2 RNA SPEC QL NAA+PROBE: SIGNIFICANT CHANGE UP
SODIUM SERPL-SCNC: 137 MMOL/L — SIGNIFICANT CHANGE UP (ref 135–145)
SP GR SPEC: 1.01 — SIGNIFICANT CHANGE UP (ref 1–1.03)
UROBILINOGEN FLD QL: 0.2 MG/DL — SIGNIFICANT CHANGE UP (ref 0.2–1)
WBC # BLD: 14.96 K/UL — HIGH (ref 3.8–10.5)
WBC # FLD AUTO: 14.96 K/UL — HIGH (ref 3.8–10.5)

## 2024-05-29 PROCEDURE — 99223 1ST HOSP IP/OBS HIGH 75: CPT | Mod: GC

## 2024-05-29 PROCEDURE — 93010 ELECTROCARDIOGRAM REPORT: CPT

## 2024-05-29 PROCEDURE — 99285 EMERGENCY DEPT VISIT HI MDM: CPT

## 2024-05-29 PROCEDURE — 71045 X-RAY EXAM CHEST 1 VIEW: CPT | Mod: 26

## 2024-05-29 RX ORDER — ENOXAPARIN SODIUM 100 MG/ML
40 INJECTION SUBCUTANEOUS EVERY 24 HOURS
Refills: 0 | Status: DISCONTINUED | OUTPATIENT
Start: 2024-05-29 | End: 2024-05-29

## 2024-05-29 RX ORDER — PIPERACILLIN AND TAZOBACTAM 4; .5 G/20ML; G/20ML
3.38 INJECTION, POWDER, LYOPHILIZED, FOR SOLUTION INTRAVENOUS ONCE
Refills: 0 | Status: COMPLETED | OUTPATIENT
Start: 2024-05-29 | End: 2024-05-29

## 2024-05-29 RX ORDER — ENOXAPARIN SODIUM 100 MG/ML
60 INJECTION SUBCUTANEOUS EVERY 12 HOURS
Refills: 0 | Status: DISCONTINUED | OUTPATIENT
Start: 2024-05-29 | End: 2024-06-02

## 2024-05-29 RX ORDER — SODIUM CHLORIDE 9 MG/ML
1000 INJECTION INTRAMUSCULAR; INTRAVENOUS; SUBCUTANEOUS
Refills: 0 | Status: DISCONTINUED | OUTPATIENT
Start: 2024-05-29 | End: 2024-06-02

## 2024-05-29 RX ORDER — HYDRALAZINE HCL 50 MG
10 TABLET ORAL EVERY 4 HOURS
Refills: 0 | Status: DISCONTINUED | OUTPATIENT
Start: 2024-05-29 | End: 2024-05-30

## 2024-05-29 RX ORDER — CHOLECALCIFEROL (VITAMIN D3) 125 MCG
0.25 CAPSULE ORAL
Qty: 0 | Refills: 0 | DISCHARGE

## 2024-05-29 RX ORDER — PIPERACILLIN AND TAZOBACTAM 4; .5 G/20ML; G/20ML
3.38 INJECTION, POWDER, LYOPHILIZED, FOR SOLUTION INTRAVENOUS ONCE
Refills: 0 | Status: DISCONTINUED | OUTPATIENT
Start: 2024-05-29 | End: 2024-05-29

## 2024-05-29 RX ORDER — PIPERACILLIN AND TAZOBACTAM 4; .5 G/20ML; G/20ML
3.38 INJECTION, POWDER, LYOPHILIZED, FOR SOLUTION INTRAVENOUS EVERY 8 HOURS
Refills: 0 | Status: DISCONTINUED | OUTPATIENT
Start: 2024-05-30 | End: 2024-06-01

## 2024-05-29 RX ORDER — SODIUM CHLORIDE 9 MG/ML
1900 INJECTION INTRAMUSCULAR; INTRAVENOUS; SUBCUTANEOUS ONCE
Refills: 0 | Status: COMPLETED | OUTPATIENT
Start: 2024-05-29 | End: 2024-05-29

## 2024-05-29 RX ORDER — ONDANSETRON 8 MG/1
4 TABLET, FILM COATED ORAL EVERY 8 HOURS
Refills: 0 | Status: DISCONTINUED | OUTPATIENT
Start: 2024-05-29 | End: 2024-06-02

## 2024-05-29 RX ORDER — ACETAMINOPHEN 500 MG
975 TABLET ORAL ONCE
Refills: 0 | Status: COMPLETED | OUTPATIENT
Start: 2024-05-29 | End: 2024-05-29

## 2024-05-29 RX ORDER — LATANOPROST 0.05 MG/ML
1 SOLUTION/ DROPS OPHTHALMIC; TOPICAL AT BEDTIME
Refills: 0 | Status: DISCONTINUED | OUTPATIENT
Start: 2024-05-29 | End: 2024-06-02

## 2024-05-29 RX ORDER — DILTIAZEM HCL 120 MG
5 CAPSULE, EXT RELEASE 24 HR ORAL
Qty: 125 | Refills: 0 | Status: DISCONTINUED | OUTPATIENT
Start: 2024-05-29 | End: 2024-05-30

## 2024-05-29 RX ORDER — ACETAMINOPHEN 500 MG
1000 TABLET ORAL ONCE
Refills: 0 | Status: DISCONTINUED | OUTPATIENT
Start: 2024-05-29 | End: 2024-05-29

## 2024-05-29 RX ADMIN — PIPERACILLIN AND TAZOBACTAM 200 GRAM(S): 4; .5 INJECTION, POWDER, LYOPHILIZED, FOR SOLUTION INTRAVENOUS at 23:06

## 2024-05-29 RX ADMIN — Medication 975 MILLIGRAM(S): at 21:08

## 2024-05-29 RX ADMIN — SODIUM CHLORIDE 1900 MILLILITER(S): 9 INJECTION INTRAMUSCULAR; INTRAVENOUS; SUBCUTANEOUS at 22:38

## 2024-05-29 NOTE — ED PROVIDER NOTE - DIFFERENTIAL DIAGNOSIS
Differential Diagnosis Patient presenting to the emergency room with a chief complaint of fever and respiratory symptoms.  Differential includes but not limited to possible bacterial pneumonia including aspiration versus viral pneumonia.  Will obtain screening septic workup RVP panel check chest x-ray EKG.  Will hydrate begin antibiotics as needed and medicate for fever.  Patient will likely require admission for oxygen supplementation.

## 2024-05-29 NOTE — H&P ADULT - PROBLEM SELECTOR PLAN 10
- Hold home Eliquis   - Lovenox - Pt with severe neuropathy making legs numb and weak. Pt fell numerous times while on a blood thinner causing his TBI. Wife states that pt may have suffered a hemorrhagic stroke at the time as well, but is unsure.   - Pt baseline status is AOx2-3, selectively conversational depending on his mood, able to swallow, unable to use bilateral upper and lower extremities, unable to ambulate, cannot sit up himself.  - Hold home Modafinil

## 2024-05-29 NOTE — H&P ADULT - NSHPSOCIALHISTORY_GEN_ALL_CORE
Lives:  ADLs:  Diet:  Vaccination:  Occupation:  Alcohol Use:  Tobacco Use:  Recreational Drug Use: Lives: Wife  ADLs: Home Health Aids, bedbound   Diet: Soft and Bite sized, with thickened liquids, needs assistance with feeding   Alcohol Use: Denies   Tobacco Use: Denies   Recreational Drug Use: Denies

## 2024-05-29 NOTE — H&P ADULT - PROBLEM SELECTOR PLAN 2
- Home Cardizem  mg daily and Eliquis 2.5 mg PO BID  - Hold in setting of strict NPO  - Will start Cardizem gtt  @ 5mg/hr given elevated rates in setting of sepsis and NPO status   - Continue with Lovenox

## 2024-05-29 NOTE — ED PROVIDER NOTE - OBJECTIVE STATEMENT
74 M hx TBI, afib on Eliquis, BPH, CAD, aspiration pneumonia, peripheral neuropathy, depression BIBEMS due to fever, elevated BP. Wife states pt with cough since yesterday. Today he didn't seem to be feeling well, felt warm. Checked his vitals and BP elevated. Denies missed medication doses. Pt with hx aspiration pneumonia. Denies recent vomiting.

## 2024-05-29 NOTE — ED ADULT TRIAGE NOTE - CHIEF COMPLAINT QUOTE
Pt to ED via EMS, wife called for hypertension and fever, on arrival pt was normotensive at house. Now hypertensive

## 2024-05-29 NOTE — ED PROVIDER NOTE - CLINICAL SUMMARY MEDICAL DECISION MAKING FREE TEXT BOX
Pt is a 73 yo male who presents to the ED with a cc o fever and AMS. PMHx of TBI , Afib on Eliquis, BPH, CAD, recurrent Aspiration PNA, Peripheral Neuropathy, and Depression. Patient presents to the ED with fever and reported elevated BP. Per wife pt developed a productive cough yesterday afternoon. Wife felt pt was warm and she check his HR and BP and found him to be tachycardic and hypertensive. EMS was called and pt was taken to the ED for further work up. Patient not answering questions at this time.  Upon chart review patient was last admitted to the hospital November 19 through November 24, 2023 with reported aspiration pneumonia.  Patient was found to have a right lower lobe pneumonia likely aspiration.  He was treated with antibiotics and was subsequently discharged. On exam patient is lying in bed appears uncomfortable dry mucous membranes.  Heart is irregular with rapid rate  lungs diminished at the bases with diffuse rhonchi abdomen soft nontender nondistended.  Patient presenting to the emergency room with a chief complaint of fever and respiratory symptoms.  Differential includes but not limited to possible bacterial pneumonia including aspiration versus viral pneumonia.  Will obtain screening septic workup RVP panel check chest x-ray EKG.  Will hydrate begin antibiotics as needed and medicate for fever.  Patient will likely require admission for oxygen supplementation.  Independent review of chest x-ray reveals concern for possible lower lobe infiltrates.  Independent review of chest x-ray reveals atrial fibrillation at 95 bpm.

## 2024-05-29 NOTE — H&P ADULT - CONVERSATION DETAILS
Discussed prior MOLST with wishes for DNR with trial intubation. Wife affirms these orders are still desired and will bring MOLST in AM.

## 2024-05-29 NOTE — H&P ADULT - PROBLEM SELECTOR PLAN 9
- Pt baseline status is AOx2-3, selectively conversational depending on his mood, able to swallow, unable to use bilateral upper and lower extremities, unable to ambulate, cannot sit up himself.  - Hold home Modafinil - Dulcolax suppository

## 2024-05-29 NOTE — ED ADULT NURSE NOTE - NS ED NOTE ABUSE RESPONSE YN
Last seen 08/2017  Last are refill 09/12/2017, due 10/10/2017 for Oxycodone  Morphine last refill 09/05/2017  Appointment 10/2017       Yes

## 2024-05-29 NOTE — ED ADULT NURSE NOTE - CADM POA URETHRAL CATHETER
From: Brooks Marvin  To: Ellie Thurston  Sent: 2/22/2023 11:48 AM CST  Subject: Another uti    So last week I was diagnosed with a 4mm kidney stone. I finished my round of cipro Friday. Im visiting my daughter in [place]. This morning it started to hurt a little when I peed. Now there is a pink tiny when I wipe.    I bought some Azo but not sure if Ill need another antibiotic
No

## 2024-05-29 NOTE — ED PROVIDER NOTE - WHICH SHOWED
Independent review of chest x-ray reveals concern for possible lower lobe infiltrates.  Independent review of chest x-ray reveals atrial fibrillation at 95 bpm.

## 2024-05-29 NOTE — H&P ADULT - ASSESSMENT
75 yo M with PMHx of TBI , Afib on Eliquis, BPH, CAD, recurrent Aspiration PNA, Peripheral Neuropathy, and Depression presents to the ED with fever and elevated blood pressures, admitted for suspected aspiration pna.

## 2024-05-29 NOTE — H&P ADULT - HISTORY OF PRESENT ILLNESS
75 yo M with PMHx of TBI , Afib on Eliquis, BPH, CAD, recurrent Aspiration PNA, Peripheral Neuropathy, and Depression presents to the ED with fever and elevated blood pressures.     Denies fever, chills, chest pain, palpitations, SOB, cough, abdominal pain, nausea, vomiting, diarrhea, constipation, urinary frequency, urgency, or dysuria, headaches, changes in vision, dizziness, numbness, tingling.  Denies recent travel, recent antibiotic use, or sick contacts.    ED Course:   Vitals: BP: 162/104, HR: 105, Temp: 102, RR: 16, SpO2: 94% on RA    Labs: WBC 14.96, Lactate 1.6, H/H 11.6/35.4   CXR: grossly clear right lung field with minimal costophrenic blunting on left as per personal read, official read pending   EKG:   Received in the ED: Tylenol Suppository x1, Zosyn 3.375 mg IVPB, NS 1.9L bolus    75 yo M with PMHx of TBI , Afib on Eliquis, BPH, CAD, recurrent Aspiration PNA, Peripheral Neuropathy, and Depression presents to the ED with fever and elevated blood pressures. Wife, Mony, at bedside states a productive cough began yesterday afternoon. Today, pt felt warm so she took pt's vitals: , O2 sat 93%, /106, no temperature taken. Called nursing agency who recommended going to the ED for evaluation. Wife endorses pt has been constipated since 5/25, however this is common. Pt baseline status is AOx2-3, selectively conversational depending on his mood, able to swallow, unable to use bilateral upper and lower extremities, unable to ambulate, cannot sit up himself. Pt does not engage with provider during encounter.     ED Course:   Vitals: BP: 162/104, HR: 105, Temp: 102, RR: 16, SpO2: 94% on RA    Labs: WBC 14.96, Lactate 1.6, H/H 11.6/35.4   CXR: grossly clear right lung field with minimal costophrenic blunting on left as per personal read, official read pending   EKG: pending   Received in the ED: Tylenol Suppository x1, Zosyn 3.375 mg IVPB, NS 1.9L bolus    73 yo M with PMHx of TBI , Afib on Eliquis, BPH, CAD, recurrent Aspiration PNA, Peripheral Neuropathy, and Depression presents to the ED with fever and elevated blood pressures. Wife, Mony, at bedside states a productive cough began yesterday afternoon. Today, pt felt warm so she took pt's vitals: , O2 sat 93%, /106, no temperature taken. Called nursing agency who recommended going to the ED for evaluation. Wife endorses pt has been constipated since 5/25, however this is common. Pt baseline status is AOx2-3, selectively conversational depending on his mood, able to swallow, unable to use bilateral upper and lower extremities, unable to ambulate, cannot sit up himself. Pt does not engage with provider during encounter.     ED Course:   Vitals: BP: 162/104, HR: 105, Temp: 102, RR: 16, SpO2: 94% on RA    Labs: WBC 14.96, Lactate 1.6, H/H 11.6/35.4   CXR: grossly clear right lung field with minimal costophrenic blunting on left as per personal read, official read pending   Received in the ED: Tylenol Suppository x1, Zosyn 3.375 mg IVPB, NS 1.9L bolus

## 2024-05-29 NOTE — H&P ADULT - ATTENDING COMMENTS
73 yo M with PMHx of TBI , Afib on Eliquis, BPH, CAD, recurrent Aspiration PNA, Peripheral Neuropathy, and Depression presents to the ED with fever and elevated blood pressures, admitted for suspected aspiration pna.    Agree with above. Edited where appropriate

## 2024-05-29 NOTE — ED ADULT NURSE NOTE - NS_SISCREENINGSR_GEN_ALL_ED
Impression: Age-related nuclear cataract, bilateral: H25.13. Plan: Patient has cataracts that are not visually significant at this time. I discussed how cataracts develop and may affect vision. Plan to monitor for now. Patient will advise us of any changes.
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 Plan: Recommend annual examination. No treatment otherwise recommended at this time.
Impression: Other vitreous opacities, right eye: H43.391. Plan: Discussion with patient that floaters are generally a benign condition. Advised patient of the signs and symptoms associated with retinal tear/break/detachment and to RTC ASAP, otherwise RTC 1 year else PRN.
Impression: Pinguecula, bilateral: H11.153. Plan: Recommend UV protection when outdoors and lubrication. Will continue to monitor for any changes annually.
Impression: Regular astigmatism, bilateral: H52.223. Plan: New spectacle Rx issued and released to patient.
Negative

## 2024-05-29 NOTE — H&P ADULT - NSHPPHYSICALEXAM_GEN_ALL_CORE
T(C): 39.4 (05-29-24 @ 21:07), Max: 39.4 (05-29-24 @ 21:07)  HR: 111 (05-29-24 @ 21:07) (105 - 111)  BP: 182/96 (05-29-24 @ 21:07) (162/104 - 182/96)  RR: 18 (05-29-24 @ 21:07) (16 - 18)  SpO2: 95% (05-29-24 @ 21:07) (94% - 95%)    GENERAL: patient appears well, no acute distress, appropriately interactive  EYES: sclera clear, no exudates  ENMT: oropharynx clear without erythema, no exudates, moist mucous membranes  NECK: supple, soft  LUNGS: good air entry bilaterally, clear to auscultation, symmetric breath sounds, no wheezing or rhonchi appreciated  HEART: soft S1/S2, regular rate and rhythm, no murmurs noted, no lower extremity edema  GASTROINTESTINAL: abdomen is soft, nontender, nondistended, normoactive bowel sounds  INTEGUMENT: good skin turgor, warm skin, appears well perfused  MUSCULOSKELETAL: no clubbing or cyanosis, no obvious deformity  NEUROLOGIC: awake, alert, oriented x3, good muscle tone in all 4 extremities  HEME/LYMPH: no obvious ecchymosis or petechiae T(C): 39.4 (05-29-24 @ 21:07), Max: 39.4 (05-29-24 @ 21:07)  HR: 111 (05-29-24 @ 21:07) (105 - 111)  BP: 182/96 (05-29-24 @ 21:07) (162/104 - 182/96)  RR: 18 (05-29-24 @ 21:07) (16 - 18)  SpO2: 95% (05-29-24 @ 21:07) (94% - 95%)    GENERAL: patient appears ill, asleep and or not intentionally not responding, no acute distress, appropriately interactive  LUNGS: difficult to assess give lack of pt participation, decreased breath sounds bilaterally, no wheezing or rhonchi appreciated  HEART: soft S1/S2, regular rate and rhythm, no murmurs noted, no lower extremity edema  GASTROINTESTINAL: abdomen is soft, nontender, nondistended, normoactive bowel sounds  INTEGUMENT: diffused keratotic lesions across bilateral lower extremities, wounds dressed, scattered ecchymotic lesions on bilateral upper and lower extremities    MUSCULOSKELETAL: no clubbing or cyanosis, no obvious deformity  NEUROLOGIC: unable to assess, baseline as per HPI T(C): 39.4 (05-29-24 @ 21:07), Max: 39.4 (05-29-24 @ 21:07)  HR: 111 (05-29-24 @ 21:07) (105 - 111)  BP: 182/96 (05-29-24 @ 21:07) (162/104 - 182/96)  RR: 18 (05-29-24 @ 21:07) (16 - 18)  SpO2: 95% (05-29-24 @ 21:07) (94% - 95%)    GENERAL: patient appears ill, asleep and or not intentionally not responding, no acute distress, appropriately interactive  LUNGS: difficult to assess give lack of pt participation, decreased breath sounds bilaterally, no wheezing or rhonchi appreciated  HEART: soft S1/S2, afib, no murmurs noted, no lower extremity edema  GASTROINTESTINAL: abdomen is soft, nontender, nondistended, normoactive bowel sounds  INTEGUMENT: diffused keratotic lesions across bilateral lower extremities, wounds dressed, scattered ecchymotic lesions on bilateral upper and lower extremities    MUSCULOSKELETAL: no clubbing or cyanosis, no obvious deformity  NEUROLOGIC: unable to assess, baseline as per HPI T(C): 39.4 (05-29-24 @ 21:07), Max: 39.4 (05-29-24 @ 21:07)  HR: 111 (05-29-24 @ 21:07) (105 - 111)  BP: 182/96 (05-29-24 @ 21:07) (162/104 - 182/96)  RR: 18 (05-29-24 @ 21:07) (16 - 18)  SpO2: 95% (05-29-24 @ 21:07) (94% - 95%)  GENERAL: patient appears ill, asleep and or not intentionally not responding, no acute distress, appropriately interactive  LUNGS: difficult to assess give lack of pt participation, decreased breath sounds bilaterally, no wheezing or rhonchi appreciated  HEART: soft S1/S2, afib, no murmurs noted, no lower extremity edema  GASTROINTESTINAL: abdomen is soft, nontender, nondistended, normoactive bowel sounds  INTEGUMENT: diffused keratotic lesions across bilateral lower extremities, wounds dressed, scattered ecchymotic lesions on bilateral upper and lower extremities    MUSCULOSKELETAL: no clubbing or cyanosis, no obvious deformity  NEUROLOGIC: unable to assess, baseline as per HPI

## 2024-05-29 NOTE — H&P ADULT - PROBLEM SELECTOR PLAN 4
- hydralazine 25 mg TID and cardizem  mg daily  - Hold PO medications   - Cardizem gtt @ 5mg/hr  - Hydralazine IVP PRN - hydralazine 25 mg TID and Cardizem  mg daily  - Hold PO medications   - Cardizem gtt @ 5mg/hr  - Hydralazine IVP PRN

## 2024-05-29 NOTE — ED ADULT NURSE NOTE - OBJECTIVE STATEMENT
Pt brought  to ED via EMS, wife called for hypertension and fever,  pt present febrile and hypertensive.

## 2024-05-29 NOTE — H&P ADULT - PROBLEM SELECTOR PLAN 1
- Septic on admission: Temp 102, , WBC 14.96, suspected source  - Lactate 1.6  - Hx of recurrent aspiration PNA  - CXR: grossly clear right lung field with minimal costophrenic blunting on left as per personal read, official read pending   - S/p Tylenol Suppository x1, Zosyn 3.375 mg IVPB, NS 1.9L bolus in ED   - Continue Zosyn   - Continue IVF  cc/hr   - RVP ordered, follow up results   - Blood cultures, follow up results   - Strict NPO; previously on easy to chew with thin liquids during last admission, wife endorsing pureed and thickened liquids at home   - Speech and Swallow eval in AM - Septic on admission: Temp 102, , WBC 14.96, suspected source  - Lactate 1.6  - UA negative  - RVP +enterorhino  - Hx of recurrent aspiration PNA   - CXR: grossly clear right lung field with minimal costophrenic blunting on left as per personal read, official read pending   - S/p Tylenol Suppository x1, Zosyn 3.375 mg IVPB, NS 1.9L bolus in ED   - Continue Zosyn   - Continue IVF  cc/hr   - Blood cultures, follow up results   - Strict NPO; previously on easy to chew with thin liquids during last admission, wife endorsing pureed and thickened liquids at home   - Speech and Swallow eval in AM - Septic on admission: Temp 102, , WBC 14.96, suspected source viral vs aspiration pna   - Lactate 1.6  - UA negative  - RVP +enterorhino  - Hx of recurrent aspiration PNA   - CXR: grossly clear right lung field with minimal costophrenic blunting on left as per personal read, official read pending   - S/p Tylenol Suppository x1, Zosyn 3.375 mg IVPB, NS 1.9L bolus in ED   - Continue Zosyn given hx recurrent aspiration   - Continue IVF  cc/hr   - Blood cultures, follow up results   - Strict NPO; previously on easy to chew with thin liquids during last admission, wife endorsing pureed and thickened liquids at home   - Speech and Swallow eval in AM

## 2024-05-29 NOTE — ED ADULT NURSE NOTE - NSFALLHARMRISKINTERV_ED_ALL_ED
Communicate risk of Fall with Harm to all staff, patient, and family/Provide visual cue: red socks, yellow wristband, yellow gown, etc/Reinforce activity limits and safety measures with patient and family/Bed in lowest position, wheels locked, appropriate side rails in place/Call bell, personal items and telephone in reach/Instruct patient to call for assistance before getting out of bed/chair/stretcher/Non-slip footwear applied when patient is off stretcher/[place] to call system/Physically safe environment - no spills, clutter or unnecessary equipment/Purposeful Proactive Rounding/Room/bathroom lighting operational, light cord in reach Assistance OOB with selected safe patient handling equipment if applicable/Assistance with ambulation/Communicate risk of Fall with Harm to all staff, patient, and family/Provide visual cue: red socks, yellow wristband, yellow gown, etc/Reinforce activity limits and safety measures with patient and family/Bed in lowest position, wheels locked, appropriate side rails in place/Call bell, personal items and telephone in reach/Instruct patient to call for assistance before getting out of bed/chair/stretcher/Non-slip footwear applied when patient is off stretcher/[place] to call system/Physically safe environment - no spills, clutter or unnecessary equipment/Purposeful Proactive Rounding/Room/bathroom lighting operational, light cord in reach

## 2024-05-29 NOTE — H&P ADULT - PROBLEM SELECTOR PLAN 5
- Hx of diabetes with previous use of Metformin  - Discontinued on medication with normal finger sticks at home  - Phumuta672 on admission   - AM A1c ordered

## 2024-05-29 NOTE — H&P ADULT - PROBLEM SELECTOR PLAN 3
- H/H 11.6/35.4   - Iron deficiency anemia on prior studies from 11/20/23  - Several open superficial skin wounds, dressed with hemostasis achieved   - No signs of active more concerning bleeding  - Continue to monitor

## 2024-05-30 LAB
A1C WITH ESTIMATED AVERAGE GLUCOSE RESULT: 5.6 % — SIGNIFICANT CHANGE UP (ref 4–5.6)
ALBUMIN SERPL ELPH-MCNC: 2.7 G/DL — LOW (ref 3.3–5)
ALP SERPL-CCNC: 76 U/L — SIGNIFICANT CHANGE UP (ref 40–120)
ALT FLD-CCNC: 13 U/L — SIGNIFICANT CHANGE UP (ref 12–78)
ANION GAP SERPL CALC-SCNC: 5 MMOL/L — SIGNIFICANT CHANGE UP (ref 5–17)
AST SERPL-CCNC: 8 U/L — LOW (ref 15–37)
BASOPHILS # BLD AUTO: 0.04 K/UL — SIGNIFICANT CHANGE UP (ref 0–0.2)
BASOPHILS NFR BLD AUTO: 0.4 % — SIGNIFICANT CHANGE UP (ref 0–2)
BILIRUB SERPL-MCNC: 0.4 MG/DL — SIGNIFICANT CHANGE UP (ref 0.2–1.2)
BUN SERPL-MCNC: 13 MG/DL — SIGNIFICANT CHANGE UP (ref 7–23)
CALCIUM SERPL-MCNC: 8.4 MG/DL — LOW (ref 8.5–10.1)
CHLORIDE SERPL-SCNC: 112 MMOL/L — HIGH (ref 96–108)
CO2 SERPL-SCNC: 26 MMOL/L — SIGNIFICANT CHANGE UP (ref 22–31)
CREAT SERPL-MCNC: 0.89 MG/DL — SIGNIFICANT CHANGE UP (ref 0.5–1.3)
EGFR: 91 ML/MIN/1.73M2 — SIGNIFICANT CHANGE UP
EOSINOPHIL # BLD AUTO: 0.04 K/UL — SIGNIFICANT CHANGE UP (ref 0–0.5)
EOSINOPHIL NFR BLD AUTO: 0.4 % — SIGNIFICANT CHANGE UP (ref 0–6)
ESTIMATED AVERAGE GLUCOSE: 114 MG/DL — SIGNIFICANT CHANGE UP (ref 68–114)
GLUCOSE SERPL-MCNC: 122 MG/DL — HIGH (ref 70–99)
HCT VFR BLD CALC: 30.9 % — LOW (ref 39–50)
HGB BLD-MCNC: 9.9 G/DL — LOW (ref 13–17)
IMM GRANULOCYTES NFR BLD AUTO: 0.5 % — SIGNIFICANT CHANGE UP (ref 0–0.9)
LYMPHOCYTES # BLD AUTO: 1.08 K/UL — SIGNIFICANT CHANGE UP (ref 1–3.3)
LYMPHOCYTES # BLD AUTO: 10.5 % — LOW (ref 13–44)
MCHC RBC-ENTMCNC: 24.6 PG — LOW (ref 27–34)
MCHC RBC-ENTMCNC: 32 GM/DL — SIGNIFICANT CHANGE UP (ref 32–36)
MCV RBC AUTO: 76.7 FL — LOW (ref 80–100)
MONOCYTES # BLD AUTO: 0.8 K/UL — SIGNIFICANT CHANGE UP (ref 0–0.9)
MONOCYTES NFR BLD AUTO: 7.8 % — SIGNIFICANT CHANGE UP (ref 2–14)
NEUTROPHILS # BLD AUTO: 8.24 K/UL — HIGH (ref 1.8–7.4)
NEUTROPHILS NFR BLD AUTO: 80.4 % — HIGH (ref 43–77)
NRBC # BLD: 0 /100 WBCS — SIGNIFICANT CHANGE UP (ref 0–0)
PLATELET # BLD AUTO: 180 K/UL — SIGNIFICANT CHANGE UP (ref 150–400)
POTASSIUM SERPL-MCNC: 3.1 MMOL/L — LOW (ref 3.5–5.3)
POTASSIUM SERPL-SCNC: 3.1 MMOL/L — LOW (ref 3.5–5.3)
PROT SERPL-MCNC: 5.7 G/DL — LOW (ref 6–8.3)
RBC # BLD: 4.03 M/UL — LOW (ref 4.2–5.8)
RBC # FLD: 16.6 % — HIGH (ref 10.3–14.5)
SODIUM SERPL-SCNC: 143 MMOL/L — SIGNIFICANT CHANGE UP (ref 135–145)
WBC # BLD: 10.25 K/UL — SIGNIFICANT CHANGE UP (ref 3.8–10.5)
WBC # FLD AUTO: 10.25 K/UL — SIGNIFICANT CHANGE UP (ref 3.8–10.5)

## 2024-05-30 PROCEDURE — 99233 SBSQ HOSP IP/OBS HIGH 50: CPT

## 2024-05-30 PROCEDURE — 99223 1ST HOSP IP/OBS HIGH 75: CPT

## 2024-05-30 RX ORDER — PANTOPRAZOLE SODIUM 20 MG/1
40 TABLET, DELAYED RELEASE ORAL
Refills: 0 | Status: DISCONTINUED | OUTPATIENT
Start: 2024-05-30 | End: 2024-05-31

## 2024-05-30 RX ORDER — DILTIAZEM HCL 120 MG
240 CAPSULE, EXT RELEASE 24 HR ORAL DAILY
Refills: 0 | Status: DISCONTINUED | OUTPATIENT
Start: 2024-05-30 | End: 2024-06-02

## 2024-05-30 RX ORDER — MIRTAZAPINE 45 MG/1
7.5 TABLET, ORALLY DISINTEGRATING ORAL DAILY
Refills: 0 | Status: DISCONTINUED | OUTPATIENT
Start: 2024-05-30 | End: 2024-06-02

## 2024-05-30 RX ORDER — GABAPENTIN 400 MG/1
400 CAPSULE ORAL THREE TIMES A DAY
Refills: 0 | Status: DISCONTINUED | OUTPATIENT
Start: 2024-05-30 | End: 2024-06-02

## 2024-05-30 RX ORDER — HYDRALAZINE HCL 50 MG
25 TABLET ORAL THREE TIMES A DAY
Refills: 0 | Status: DISCONTINUED | OUTPATIENT
Start: 2024-05-30 | End: 2024-06-02

## 2024-05-30 RX ADMIN — PIPERACILLIN AND TAZOBACTAM 25 GRAM(S): 4; .5 INJECTION, POWDER, LYOPHILIZED, FOR SOLUTION INTRAVENOUS at 13:29

## 2024-05-30 RX ADMIN — PANTOPRAZOLE SODIUM 40 MILLIGRAM(S): 20 TABLET, DELAYED RELEASE ORAL at 13:30

## 2024-05-30 RX ADMIN — SODIUM CHLORIDE 100 MILLILITER(S): 9 INJECTION INTRAMUSCULAR; INTRAVENOUS; SUBCUTANEOUS at 12:18

## 2024-05-30 RX ADMIN — ENOXAPARIN SODIUM 60 MILLIGRAM(S): 100 INJECTION SUBCUTANEOUS at 19:07

## 2024-05-30 RX ADMIN — Medication 240 MILLIGRAM(S): at 13:29

## 2024-05-30 RX ADMIN — SODIUM CHLORIDE 100 MILLILITER(S): 9 INJECTION INTRAMUSCULAR; INTRAVENOUS; SUBCUTANEOUS at 19:09

## 2024-05-30 RX ADMIN — GABAPENTIN 400 MILLIGRAM(S): 400 CAPSULE ORAL at 13:30

## 2024-05-30 RX ADMIN — ENOXAPARIN SODIUM 60 MILLIGRAM(S): 100 INJECTION SUBCUTANEOUS at 06:49

## 2024-05-30 RX ADMIN — Medication 25 MILLIGRAM(S): at 23:50

## 2024-05-30 RX ADMIN — PIPERACILLIN AND TAZOBACTAM 25 GRAM(S): 4; .5 INJECTION, POWDER, LYOPHILIZED, FOR SOLUTION INTRAVENOUS at 06:49

## 2024-05-30 RX ADMIN — GABAPENTIN 400 MILLIGRAM(S): 400 CAPSULE ORAL at 23:48

## 2024-05-30 RX ADMIN — MIRTAZAPINE 7.5 MILLIGRAM(S): 45 TABLET, ORALLY DISINTEGRATING ORAL at 13:29

## 2024-05-30 RX ADMIN — Medication 10 MILLIGRAM(S): at 12:22

## 2024-05-30 RX ADMIN — LATANOPROST 1 DROP(S): 0.05 SOLUTION/ DROPS OPHTHALMIC; TOPICAL at 23:51

## 2024-05-30 RX ADMIN — Medication 25 MILLIGRAM(S): at 13:29

## 2024-05-30 RX ADMIN — Medication 5 MG/HR: at 03:15

## 2024-05-30 RX ADMIN — PIPERACILLIN AND TAZOBACTAM 25 GRAM(S): 4; .5 INJECTION, POWDER, LYOPHILIZED, FOR SOLUTION INTRAVENOUS at 23:48

## 2024-05-30 NOTE — ED ADULT NURSE REASSESSMENT NOTE - NS ED NURSE REASSESS COMMENT FT1
report received from previous RN. received patient resting in stretcher, denies pain/discomfort at this time. contracted, cardizem gtt infusing as ordered. rate controlled a-fib noted on bedside monitor. afebrile at this time. care ongoing.

## 2024-05-30 NOTE — SWALLOW BEDSIDE ASSESSMENT ADULT - COMMENTS
As per H&P dated 5/29/24 "73 yo M with PMHx of TBI , Afib on Eliquis, BPH, CAD, recurrent Aspiration PNA, Peripheral Neuropathy, and Depression presents to the ED with fever and elevated blood pressures, admitted for suspected aspiration pna."    Chest imaging not available.     Patient is known to this service, last seen on 11/22/23 for a Modified Barium Swallow study with recommendation of Easy to Chew Solids with Moderately-Thick Liquids (see note for details).     Patient visited at bedside for clinical swallow evaluation. Patient presents as awake and alert. Inconsistently follows 1-step directives. Limited verbalizations produced with intermittent need for repetition of questions. Patient receiving supplemental oxygen via nasal cannula. Patient noted with suboptimal positioning despite re-positioning with head leaning to left.

## 2024-05-30 NOTE — PATIENT PROFILE ADULT - FUNCTIONAL SCREEN CURRENT LEVEL: SWALLOWING (IF SCORE 2 OR MORE FOR ANY ITEM, CONSULT REHAB SERVICES), MLM)
2 = difficulty swallowing liquids spouse stated pt on thickened liquids at home/2 = difficulty swallowing liquids

## 2024-05-30 NOTE — PATIENT PROFILE ADULT - FUNCTIONAL SCREEN CURRENT LEVEL: COMMUNICATION, MLM
2 = difficulty understanding and speaking (not related to language barrier) Pt with Hx TBI selectively conversational depending on mood/2 = difficulty understanding and speaking (not related to language barrier)

## 2024-05-30 NOTE — CARE COORDINATION ASSESSMENT. - NSCAREPROVIDERS_GEN_ALL_CORE_FT
CARE PROVIDERS:  Accepting Physician: Sabrina Fletcher  Administration: Daniel Duncan  Administration: Edilberto Jaimes  Administration: Rowan Lira  Admitting: Sabrina Fletcher  Attending: Sabrina Fletcher  Case Management: Micaela Adler  Consultant: Bobo Sepulveda  Consultant: Andres Ruth  Covering Team: Sabrina Fletcher  ED ACP: Ana Maria Metcalf  ED Attending: Jayashree Abbott  ED Nurse: Melanie Wooten  Nurse: Harriet Lorenzo  Nurse: Eddie Rodriguez  Nurse: Shannon Baker  Ordered: ServiceAccount, SCMMLM  Ordered: Doctor, Unknown  Outpatient Provider: Nigel Joe  Outpatient Provider: Mirta Goldsmith  Override: Harriet Lorenzo  PCA/Nursing Assistant: Severiano Navarro  Primary Team: [place]  Primary Team: Ovi Keene  : Edel Aviles  Speech Pathology: Lexi Ponce  Team: [facility], Team  UR// Supp. Assoc.: Guerline Rebollar

## 2024-05-30 NOTE — PATIENT PROFILE ADULT - NSPROMEDSADMININFO_GEN_A_NUR
crush with apple suace/administer in food/crush pills for administration  crush with apple sauce/administer in food/crush pills for administration

## 2024-05-30 NOTE — CARE COORDINATION ASSESSMENT. - NS SW [facility] DISCIPLINE
VNS for RN/PT/OT/Speech    [facility]   M-F 10 hours   Sat and Sunday 8 hours/occupational therapy/physical therapy/skilled nursing/speech and language pathology

## 2024-05-30 NOTE — ED ADULT NURSE REASSESSMENT NOTE - NS ED NURSE REASSESS COMMENT FT1
pt notes to be incontinent, generalized bruising noted,  wound noted, x2 to left lower extremity and x1 noted to right lower extremity, wound to right elbow , pt notes to be afebrile, bedside cardiac monitor afib noted, will continue to monitor pt. Fidel JUNIOR

## 2024-05-30 NOTE — PROGRESS NOTE ADULT - SUBJECTIVE AND OBJECTIVE BOX
Patient is a 74y old  Male who presents with a chief complaint of Sepsis (30 May 2024 09:49)        HPI:  75 yo M with PMHx of TBI , Afib on Eliquis, BPH, CAD, recurrent Aspiration PNA, Peripheral Neuropathy, and Depression presents to the ED with fever and elevated blood pressures. Wife, Mony, at bedside states a productive cough began yesterday afternoon. Today, pt felt warm so she took pt's vitals: , O2 sat 93%, /106, no temperature taken. Called nursing agency who recommended going to the ED for evaluation. Wife endorses pt has been constipated since 5/25, however this is common. Pt baseline status is AOx2-3, selectively conversational depending on his mood, able to swallow, unable to use bilateral upper and lower extremities, unable to ambulate, cannot sit up himself. Pt does not engage with provider during encounter.     ED Course:   Vitals: BP: 162/104, HR: 105, Temp: 102, RR: 16, SpO2: 94% on RA    Labs: WBC 14.96, Lactate 1.6, H/H 11.6/35.4   CXR: grossly clear right lung field with minimal costophrenic blunting on left as per personal read, official read pending   Received in the ED: Tylenol Suppository x1, Zosyn 3.375 mg IVPB, NS 1.9L bolus    (29 May 2024 22:07)      SUBJECTIVE & OBJECTIVE: Pt seen and examined at bedside. nad    PHYSICAL EXAM:  T(C): 36.9 (05-30-24 @ 08:29), Max: 39.4 (05-29-24 @ 21:07)  HR: 68 (05-30-24 @ 08:29) (68 - 111)  BP: 145/65 (05-30-24 @ 08:29) (111/75 - 182/96)  RR: 15 (05-30-24 @ 08:29) (15 - 18)  SpO2: 98% (05-30-24 @ 08:29) (94% - 98%)  Wt(kg): -- Height (cm): 165.1 (05-29 @ 19:27)  Weight (kg): 61.2 (05-29 @ 19:27)  BMI (kg/m2): 22.5 (05-29 @ 19:27)  BSA (m2): 1.67 (05-29 @ 19:27)  GENERAL: NAD, well-groomed, well-developed  HEAD:  Atraumatic, Normocephalic  EYES: EOMI, PERRLA, conjunctiva and sclera clear  ENMT: Moist mucous membranes  NECK: Supple, No JVD  NERVOUS SYSTEM:  Alert & Oriented  CHEST/LUNG: decrease air entry athe bases   HEART: Regular rate and rhythm; No murmurs, rubs, or gallops  ABDOMEN: Soft, Nontender, Nondistended; Bowel sounds present  EXTREMITIES:  2+ Peripheral Pulses, No clubbing, cyanosis, or edema        MEDICATIONS  (STANDING):  bisacodyl Suppository 10 milliGRAM(s) Rectal daily  diltiazem Infusion 5 mG/Hr (5 mL/Hr) IV Continuous <Continuous>  enoxaparin Injectable 60 milliGRAM(s) SubCutaneous every 12 hours  latanoprost 0.005% Ophthalmic Solution 1 Drop(s) Both EYES at bedtime  piperacillin/tazobactam IVPB.. 3.375 Gram(s) IV Intermittent every 8 hours  sodium chloride 0.9%. 1000 milliLiter(s) (100 mL/Hr) IV Continuous <Continuous>    MEDICATIONS  (PRN):  hydrALAZINE Injectable 10 milliGRAM(s) IV Push every 4 hours PRN for BP >160  ondansetron Injectable 4 milliGRAM(s) IV Push every 8 hours PRN Nausea and/or Vomiting      LABS:                        9.9    10.25 )-----------( 180      ( 30 May 2024 05:55 )             30.9     05-30    143  |  112<H>  |  13  ----------------------------<  122<H>  3.1<L>   |  26  |  0.89    Ca    8.4<L>      30 May 2024 05:55    TPro  5.7<L>  /  Alb  2.7<L>  /  TBili  0.4  /  DBili  x   /  AST  8<L>  /  ALT  13  /  AlkPhos  76  05-30    PT/INR - ( 29 May 2024 20:45 )   PT: 13.2 sec;   INR: 1.13 ratio         PTT - ( 29 May 2024 20:45 )  PTT:34.4 sec  Urinalysis Basic - ( 30 May 2024 05:55 )    Color: x / Appearance: x / SG: x / pH: x  Gluc: 122 mg/dL / Ketone: x  / Bili: x / Urobili: x   Blood: x / Protein: x / Nitrite: x   Leuk Esterase: x / RBC: x / WBC x   Sq Epi: x / Non Sq Epi: x / Bacteria: x        CAPILLARY BLOOD GLUCOSE          CAPILLARY BLOOD GLUCOSE        CAPILLARY BLOOD GLUCOSE                RECENT CULTURES:      RADIOLOGY & ADDITIONAL TESTS:                        DVT/GI ppx  Discussed with pt @ bedside

## 2024-05-30 NOTE — PATIENT PROFILE ADULT - NSPROGENOTHERPROVIDER_GEN_A_NUR
home care Pt's spuse reports pt has home care services at home [facility] VNS PT OT SP,   has Home Aide via [facility] 8:15am-6:15pm  M-F  9-5 Sat -Sun/home care

## 2024-05-30 NOTE — SWALLOW BEDSIDE ASSESSMENT ADULT - ASR SWALLOW RECOMMEND DIAG
2. MBS at MD's discretion to rule out silent aspiration given documented concern for aspiration pneumonia

## 2024-05-30 NOTE — PATIENT PROFILE ADULT - NSPROHMSYMPCOND_GEN_A_NUR
musculoskeletal Pt's spuse reports pt has home care services at home [facility] VNS PT OT SP,   has Home Aide via [facility] 8:15am-6:15pm  M-F  9-5 Sat -Sun/musculoskeletal

## 2024-05-30 NOTE — CONSULT NOTE ADULT - ATTENDING COMMENTS
75 yo M with PMHx of TBI , Afib on Eliquis, BPH, CAD, recurrent Aspiration PNA, Peripheral Neuropathy, and Depression presents to the ED with fever and elevated blood pressures, admitted for suspected aspiration pna.     - Known history of a.fib, takes Cardizem  QD at home   - Admitted for suspected aspiration PNA, strict NPO until official S&S eval   - Initiated on Cardizem gtt 5mg/hr overnight; continue the gtt   - Ok to resume home Cardizem PO regimen if pt passes swallow eval  - Continue FD Lovenox while NPO  - Monitor on tele  - Holding home PO hydralazine in strict NPO setting  - Ok to continue IV hydralazine PRN; resume PO home dose if pt passes swallow eval  - Discussed with wife in detail

## 2024-05-30 NOTE — SWALLOW BEDSIDE ASSESSMENT ADULT - SWALLOW EVAL: DIAGNOSIS
1. Mild oral dysphagia for puree, soft and bite sized solids, easy to chew solids and moderately-thick liquids marked by adequate acceptance and containment, prolonged bolus manipulation, adequate mastication of solids, delayed oral transit and adequate oral clearance. 2. Mild pharyngeal dysphagia for aforementioned consistencies marked by a suspected delay in initiation of pharyngeal swallow, hyolaryngeal excursion present upon palpation and no overt s/s of penetration/aspiration evidenced.

## 2024-05-30 NOTE — PATIENT PROFILE ADULT - FALL HARM RISK - HARM RISK INTERVENTIONS
Assistance with ambulation/Assistance OOB with selected safe patient handling equipment/Communicate Risk of Fall with Harm to all staff/Discuss with provider need for PT consult/Monitor gait and stability/Provide patient with walking aids - walker, cane, crutches/Reinforce activity limits and safety measures with patient and family/Tailored Fall Risk Interventions/Toileting schedule using arm’s reach rule for commode and bathroom/Visual Cue: Yellow wristband and red socks/Bed in lowest position, wheels locked, appropriate side rails in place/Call bell, personal items and telephone in reach/Instruct patient to call for assistance before getting out of bed or chair/Non-slip footwear when patient is out of bed/[place] to call system/Physically safe environment - no spills, clutter or unnecessary equipment/Purposeful Proactive Rounding/Room/bathroom lighting operational, light cord in reach Assistance with ambulation/Assistance OOB with selected safe patient handling equipment/Communicate Risk of Fall with Harm to all staff/Discuss with provider need for PT consult/Monitor gait and stability/Provide patient with walking aids - walker, cane, crutches/Reinforce activity limits and safety measures with patient and family/Tailored Fall Risk Interventions/Toileting schedule using arm’s reach rule for commode and bathroom/Visual Cue: Yellow wristband and red socks/Bed in lowest position, wheels locked, appropriate side rails in place/Call bell, personal items and telephone in reach/Instruct patient to call for assistance before getting out of bed or chair/Non-slip footwear when patient is out of bed/[place] to call system/Physically safe environment - no spills, clutter or unnecessary equipment/Purposeful Proactive Rounding/Room/bathroom lighting operational, light cord in reach/Unable to comprehend

## 2024-05-30 NOTE — PATIENT PROFILE ADULT - FALL HARM RISK - FACTORS
Frequent toileting needed/Impaired gait/IV and/or equipment tethered to patient/Other medical problems/Weakness/Other

## 2024-05-30 NOTE — CONSULT NOTE ADULT - ASSESSMENT
Assessment: 75 yo M with PMHx of TBI , Afib on Eliquis, BPH, CAD, recurrent Aspiration PNA, Peripheral Neuropathy, and Depression presents to the ED with fever and elevated blood pressures, admitted for suspected aspiration pna.     Plan:     - Known history of a.fib, takes Cardizem  QD at home   - Admitted for suspected aspiration PNA, strict NPO until official S&S eval   - Initiated on Cardizem gtt 5mg/hr overnight; continue the gtt   - Ok to resume home Cardizem PO regimen if pt passes swallow eval  - Continue FD Lovenox   - Monitor on tele    - BP well controlled, monitor routine hemodynamics.  - Holding home PO hydralazine in strict NPO setting  - Ok to continue IV hydralazine PRN; resume PO home dose if pt passes swallow eval    - Monitor and replete lytes, keep K>4, Mg>2.  - Other cardiovascular workup will depend on clinical course.  - All other workup per primary team.  - Will continue to follow.

## 2024-05-30 NOTE — CONSULT NOTE ADULT - SUBJECTIVE AND OBJECTIVE BOX
[facility]         Coco Blackburn Patel, Savella, Cohen      [phone] (office)    Reason for Consult: afib     Interval HPI: Patient seen and examined at bedside. No acute overnight events. Patient admitted for sepsis 2/2 possible aspiration PNA vs. viral PNA. NPO for now, takes low dose eliquis BID and cardizem for a.fib. On presentation, appears to have been afib     HPI:  73 yo M with PMHx of TBI , Afib on Eliquis, BPH, CAD, recurrent Aspiration PNA, Peripheral Neuropathy, and Depression presents to the ED with fever and elevated blood pressures. Wife, Mony, at bedside states a productive cough began yesterday afternoon. Today, pt felt warm so she took pt's vitals: , O2 sat 93%, /106, no temperature taken. Called nursing agency who recommended going to the ED for evaluation. Wife endorses pt has been constipated since 5/25, however this is common. Pt baseline status is AOx2-3, selectively conversational depending on his mood, able to swallow, unable to use bilateral upper and lower extremities, unable to ambulate, cannot sit up himself. Pt does not engage with provider during encounter.     ED Course:   Vitals: BP: 162/104, HR: 105, Temp: 102, RR: 16, SpO2: 94% on RA    Labs: WBC 14.96, Lactate 1.6, H/H 11.6/35.4   CXR: grossly clear right lung field with minimal costophrenic blunting on left as per personal read, official read pending   Received in the ED: Tylenol Suppository x1, Zosyn 3.375 mg IVPB, NS 1.9L bolus    (29 May 2024 22:07)      PAST MEDICAL & SURGICAL HISTORY:  TBI (traumatic brain injury)      HTN (hypertension)      Atrial fibrillation      Peripheral neuropathy      Major depression      HLD (hyperlipidemia)      CAD (coronary artery disease)      BPH (benign prostatic hyperplasia)      Pneumonia due to COVID-19 virus  June 2022      Hemorrhage in the brain      H/O craniotomy          SOCIAL HISTORY: No active tobacco, alcohol or illicit drug use    FAMILY HISTORY:  FH: HTN (hypertension) (Father, Mother, Sibling)    Family history of bone cancer (Sibling)    FH: Alzheimers disease (Sibling)        Home Medications:  Cardizem  mg/24 hours oral capsule, extended release: 1 cap(s) orally once a day (29 May 2024 23:00)  cholecalciferol 10 mcg/0.25 mL (400 intl units/0.25 mL) oral liquid: 0.25 milliliter(s) orally once a day (29 May 2024 23:00)  gabapentin 400 mg oral capsule: 1 cap(s) orally 3 times a day (29 May 2024 23:00)  hydrALAZINE 25 mg oral tablet: 1 tab(s) orally 3 times a day (29 May 2024 23:00)  latanoprost 0.005% ophthalmic solution: 1 drop(s) to each affected eye once a day (at bedtime) (29 May 2024 23:00)  mirtazapine 7.5 mg oral tablet: 1 tab(s) orally once a day (29 May 2024 23:00)  modafinil 100 mg oral tablet: 1 tab(s) orally once a day (29 May 2024 23:00)  pantoprazole 40 mg oral delayed release tablet: 1 tab(s) orally once a day (before a meal) (29 May 2024 23:00)      MEDICATIONS  (STANDING):  bisacodyl Suppository 10 milliGRAM(s) Rectal daily  diltiazem Infusion 5 mG/Hr (5 mL/Hr) IV Continuous <Continuous>  enoxaparin Injectable 60 milliGRAM(s) SubCutaneous every 12 hours  latanoprost 0.005% Ophthalmic Solution 1 Drop(s) Both EYES at bedtime  piperacillin/tazobactam IVPB.. 3.375 Gram(s) IV Intermittent every 8 hours  sodium chloride 0.9%. 1000 milliLiter(s) (100 mL/Hr) IV Continuous <Continuous>    MEDICATIONS  (PRN):  hydrALAZINE Injectable 10 milliGRAM(s) IV Push every 4 hours PRN for BP >160  ondansetron Injectable 4 milliGRAM(s) IV Push every 8 hours PRN Nausea and/or Vomiting      Allergies    No Known Allergies    Intolerances        REVIEW OF SYSTEMS: Negative except as per HPI.    VITAL SIGNS:   Vital Signs Last 24 Hrs  T(C): 36.9 (30 May 2024 08:29), Max: 39.4 (29 May 2024 21:07)  T(F): 98.5 (30 May 2024 08:29), Max: 103 (29 May 2024 21:07)  HR: 68 (30 May 2024 08:29) (68 - 111)  BP: 145/65 (30 May 2024 08:29) (111/75 - 182/96)  BP(mean): --  RR: 15 (30 May 2024 08:29) (15 - 18)  SpO2: 98% (30 May 2024 08:29) (94% - 98%)    Parameters below as of 30 May 2024 08:29  Patient On (Oxygen Delivery Method): nasal cannula  O2 Flow (L/min): 3      I&O's Summary      PHYSICAL EXAM:  Constitutional: NAD, well-developed  HEENT NC/AT, moist mucous membranes  Pulmonary: Non-labored, breath sounds are clear bilaterally, no wheezing, rales or rhonchi  Cardiovascular: +S1, S2, RRR, no murmur  Gastrointestinal: Soft, nontender, nondistended, normoactive bowel sounds  Extremities: No peripheral edema   Neurological: Alert, strength and sensitivity are grossly intact  Skin: No obvious lesions/rashes  Psych: Mood & affect appropriate    LABS: All Labs Reviewed:                        9.9    10.25 )-----------( 180      ( 30 May 2024 05:55 )             30.9                         11.6   14.96 )-----------( 205      ( 29 May 2024 20:45 )             35.4     30 May 2024 05:55    143    |  112    |  13     ----------------------------<  122    3.1     |  26     |  0.89   29 May 2024 20:45    137    |  103    |  14     ----------------------------<  121    3.9     |  27     |  1.10     Ca    8.4        30 May 2024 05:55  Ca    8.9        29 May 2024 20:45    TPro  5.7    /  Alb  2.7    /  TBili  0.4    /  DBili  x      /  AST  8      /  ALT  13     /  AlkPhos  76     30 May 2024 05:55  TPro  7.1    /  Alb  3.4    /  TBili  0.5    /  DBili  x      /  AST  18     /  ALT  16     /  AlkPhos  95     29 May 2024 20:45    PT/INR - ( 29 May 2024 20:45 )   PT: 13.2 sec;   INR: 1.13 ratio         PTT - ( 29 May 2024 20:45 )  PTT:34.4 sec      Blood Culture:         EKG:    RADIOLOGY:    CXR:   [facility]         Coco Blackburn Patel, Savella, Cohen      [phone] (office)    Reason for Consult: afib     Interval HPI: Patient seen and examined at bedside. No acute overnight events. Patient admitted for sepsis 2/2 possible aspiration PNA vs. viral PNA. NPO for now, takes low dose eliquis BID and cardizem for a.fib. On presentation, EKG showed afib -120s, asymptomatic.     HPI:  73 yo M with PMHx of TBI , Afib on Eliquis, BPH, CAD, recurrent Aspiration PNA, Peripheral Neuropathy, and Depression presents to the ED with fever and elevated blood pressures. Wife, Mony, at bedside states a productive cough began yesterday afternoon. Today, pt felt warm so she took pt's vitals: , O2 sat 93%, /106, no temperature taken. Called nursing agency who recommended going to the ED for evaluation. Wife endorses pt has been constipated since 5/25, however this is common. Pt baseline status is AOx2-3, selectively conversational depending on his mood, able to swallow, unable to use bilateral upper and lower extremities, unable to ambulate, cannot sit up himself. Pt does not engage with provider during encounter.     ED Course:   Vitals: BP: 162/104, HR: 105, Temp: 102, RR: 16, SpO2: 94% on RA    Labs: WBC 14.96, Lactate 1.6, H/H 11.6/35.4   CXR: grossly clear right lung field with minimal costophrenic blunting on left as per personal read, official read pending   Received in the ED: Tylenol Suppository x1, Zosyn 3.375 mg IVPB, NS 1.9L bolus    (29 May 2024 22:07)      PAST MEDICAL & SURGICAL HISTORY:  TBI (traumatic brain injury)      HTN (hypertension)      Atrial fibrillation      Peripheral neuropathy      Major depression      HLD (hyperlipidemia)      CAD (coronary artery disease)      BPH (benign prostatic hyperplasia)      Pneumonia due to COVID-19 virus  June 2022      Hemorrhage in the brain      H/O craniotomy          SOCIAL HISTORY: No active tobacco, alcohol or illicit drug use    FAMILY HISTORY:  FH: HTN (hypertension) (Father, Mother, Sibling)    Family history of bone cancer (Sibling)    FH: Alzheimers disease (Sibling)        Home Medications:  Cardizem  mg/24 hours oral capsule, extended release: 1 cap(s) orally once a day (29 May 2024 23:00)  cholecalciferol 10 mcg/0.25 mL (400 intl units/0.25 mL) oral liquid: 0.25 milliliter(s) orally once a day (29 May 2024 23:00)  gabapentin 400 mg oral capsule: 1 cap(s) orally 3 times a day (29 May 2024 23:00)  hydrALAZINE 25 mg oral tablet: 1 tab(s) orally 3 times a day (29 May 2024 23:00)  latanoprost 0.005% ophthalmic solution: 1 drop(s) to each affected eye once a day (at bedtime) (29 May 2024 23:00)  mirtazapine 7.5 mg oral tablet: 1 tab(s) orally once a day (29 May 2024 23:00)  modafinil 100 mg oral tablet: 1 tab(s) orally once a day (29 May 2024 23:00)  pantoprazole 40 mg oral delayed release tablet: 1 tab(s) orally once a day (before a meal) (29 May 2024 23:00)      MEDICATIONS  (STANDING):  bisacodyl Suppository 10 milliGRAM(s) Rectal daily  diltiazem Infusion 5 mG/Hr (5 mL/Hr) IV Continuous <Continuous>  enoxaparin Injectable 60 milliGRAM(s) SubCutaneous every 12 hours  latanoprost 0.005% Ophthalmic Solution 1 Drop(s) Both EYES at bedtime  piperacillin/tazobactam IVPB.. 3.375 Gram(s) IV Intermittent every 8 hours  sodium chloride 0.9%. 1000 milliLiter(s) (100 mL/Hr) IV Continuous <Continuous>    MEDICATIONS  (PRN):  hydrALAZINE Injectable 10 milliGRAM(s) IV Push every 4 hours PRN for BP >160  ondansetron Injectable 4 milliGRAM(s) IV Push every 8 hours PRN Nausea and/or Vomiting      Allergies    No Known Allergies    Intolerances        REVIEW OF SYSTEMS: Negative except as per HPI.    VITAL SIGNS:   Vital Signs Last 24 Hrs  T(C): 36.9 (30 May 2024 08:29), Max: 39.4 (29 May 2024 21:07)  T(F): 98.5 (30 May 2024 08:29), Max: 103 (29 May 2024 21:07)  HR: 68 (30 May 2024 08:29) (68 - 111)  BP: 145/65 (30 May 2024 08:29) (111/75 - 182/96)  BP(mean): --  RR: 15 (30 May 2024 08:29) (15 - 18)  SpO2: 98% (30 May 2024 08:29) (94% - 98%)    Parameters below as of 30 May 2024 08:29  Patient On (Oxygen Delivery Method): nasal cannula  O2 Flow (L/min): 3      I&O's Summary      PHYSICAL EXAM:  Constitutional: NAD  HEENT NC/AT, dry mucous membranes  Pulmonary: Decreased breath sounds b/l   Cardiovascular: +S1, S2, irregularly irregular   Gastrointestinal: Soft, nontender, nondistended, normoactive bowel sounds  Extremities: +1 pitting edema   Neurological: Alert, strength and sensitivity are grossly intact  Skin: diffused keratotic lesions across bilateral lower extremities  Psych: Mood & affect appropriate    LABS: All Labs Reviewed:                        9.9    10.25 )-----------( 180      ( 30 May 2024 05:55 )             30.9                         11.6   14.96 )-----------( 205      ( 29 May 2024 20:45 )             35.4     30 May 2024 05:55    143    |  112    |  13     ----------------------------<  122    3.1     |  26     |  0.89   29 May 2024 20:45    137    |  103    |  14     ----------------------------<  121    3.9     |  27     |  1.10     Ca    8.4        30 May 2024 05:55  Ca    8.9        29 May 2024 20:45    TPro  5.7    /  Alb  2.7    /  TBili  0.4    /  DBili  x      /  AST  8      /  ALT  13     /  AlkPhos  76     30 May 2024 05:55  TPro  7.1    /  Alb  3.4    /  TBili  0.5    /  DBili  x      /  AST  18     /  ALT  16     /  AlkPhos  95     29 May 2024 20:45    PT/INR - ( 29 May 2024 20:45 )   PT: 13.2 sec;   INR: 1.13 ratio         PTT - ( 29 May 2024 20:45 )  PTT:34.4 sec      Blood Culture:         EKG:    RADIOLOGY:    CXR:

## 2024-05-30 NOTE — CARE COORDINATION ASSESSMENT. - OTHER PERTINENT DISCHARGE PLANNING INFORMATION:
pt is a 74 year old man admitted from home with pneumonia. pt with h/o TBI. sw spoke with pts wife Mony, workers role discussed. pt non-ambulatory, bedbound - eladio lift, wheelchair and hospital bed at home. as per pts wife pt with [facility] services thru [facility] M-F 10 hours Sat and Sun 8 hours/day.  pt known to [facility] for RN/PT/OT and speech. pts wife confirmed plans for pt to return home when stable. no home o2.

## 2024-05-31 LAB
ALBUMIN SERPL ELPH-MCNC: 2.7 G/DL — LOW (ref 3.3–5)
ALP SERPL-CCNC: 66 U/L — SIGNIFICANT CHANGE UP (ref 40–120)
ALT FLD-CCNC: 10 U/L — LOW (ref 12–78)
ANION GAP SERPL CALC-SCNC: 4 MMOL/L — LOW (ref 5–17)
ANION GAP SERPL CALC-SCNC: 5 MMOL/L — SIGNIFICANT CHANGE UP (ref 5–17)
AST SERPL-CCNC: 9 U/L — LOW (ref 15–37)
BILIRUB SERPL-MCNC: 0.3 MG/DL — SIGNIFICANT CHANGE UP (ref 0.2–1.2)
BUN SERPL-MCNC: 11 MG/DL — SIGNIFICANT CHANGE UP (ref 7–23)
BUN SERPL-MCNC: 14 MG/DL — SIGNIFICANT CHANGE UP (ref 7–23)
CALCIUM SERPL-MCNC: 8 MG/DL — LOW (ref 8.5–10.1)
CALCIUM SERPL-MCNC: 8.3 MG/DL — LOW (ref 8.5–10.1)
CHLORIDE SERPL-SCNC: 113 MMOL/L — HIGH (ref 96–108)
CHLORIDE SERPL-SCNC: 113 MMOL/L — HIGH (ref 96–108)
CO2 SERPL-SCNC: 25 MMOL/L — SIGNIFICANT CHANGE UP (ref 22–31)
CO2 SERPL-SCNC: 26 MMOL/L — SIGNIFICANT CHANGE UP (ref 22–31)
CREAT SERPL-MCNC: 0.95 MG/DL — SIGNIFICANT CHANGE UP (ref 0.5–1.3)
CREAT SERPL-MCNC: 1 MG/DL — SIGNIFICANT CHANGE UP (ref 0.5–1.3)
EGFR: 80 ML/MIN/1.73M2 — SIGNIFICANT CHANGE UP
EGFR: 85 ML/MIN/1.73M2 — SIGNIFICANT CHANGE UP
GLUCOSE SERPL-MCNC: 127 MG/DL — HIGH (ref 70–99)
GLUCOSE SERPL-MCNC: 128 MG/DL — HIGH (ref 70–99)
HCT VFR BLD CALC: 28 % — LOW (ref 39–50)
HGB BLD-MCNC: 9 G/DL — LOW (ref 13–17)
MAGNESIUM SERPL-MCNC: 1.8 MG/DL — SIGNIFICANT CHANGE UP (ref 1.6–2.6)
MCHC RBC-ENTMCNC: 24.9 PG — LOW (ref 27–34)
MCHC RBC-ENTMCNC: 32.1 GM/DL — SIGNIFICANT CHANGE UP (ref 32–36)
MCV RBC AUTO: 77.6 FL — LOW (ref 80–100)
NRBC # BLD: 0 /100 WBCS — SIGNIFICANT CHANGE UP (ref 0–0)
PHOSPHATE SERPL-MCNC: 2.6 MG/DL — SIGNIFICANT CHANGE UP (ref 2.5–4.5)
PLATELET # BLD AUTO: 181 K/UL — SIGNIFICANT CHANGE UP (ref 150–400)
POTASSIUM SERPL-MCNC: 2.9 MMOL/L — CRITICAL LOW (ref 3.5–5.3)
POTASSIUM SERPL-MCNC: 4 MMOL/L — SIGNIFICANT CHANGE UP (ref 3.5–5.3)
POTASSIUM SERPL-SCNC: 2.9 MMOL/L — CRITICAL LOW (ref 3.5–5.3)
POTASSIUM SERPL-SCNC: 4 MMOL/L — SIGNIFICANT CHANGE UP (ref 3.5–5.3)
PROCALCITONIN SERPL-MCNC: 0.22 NG/ML — HIGH
PROT SERPL-MCNC: 5.7 G/DL — LOW (ref 6–8.3)
RBC # BLD: 3.61 M/UL — LOW (ref 4.2–5.8)
RBC # FLD: 16.6 % — HIGH (ref 10.3–14.5)
SODIUM SERPL-SCNC: 143 MMOL/L — SIGNIFICANT CHANGE UP (ref 135–145)
SODIUM SERPL-SCNC: 143 MMOL/L — SIGNIFICANT CHANGE UP (ref 135–145)
WBC # BLD: 6.05 K/UL — SIGNIFICANT CHANGE UP (ref 3.8–10.5)
WBC # FLD AUTO: 6.05 K/UL — SIGNIFICANT CHANGE UP (ref 3.8–10.5)

## 2024-05-31 PROCEDURE — 99232 SBSQ HOSP IP/OBS MODERATE 35: CPT

## 2024-05-31 PROCEDURE — 99233 SBSQ HOSP IP/OBS HIGH 50: CPT

## 2024-05-31 PROCEDURE — 71250 CT THORAX DX C-: CPT | Mod: 26

## 2024-05-31 PROCEDURE — 74230 X-RAY XM SWLNG FUNCJ C+: CPT | Mod: 26

## 2024-05-31 RX ORDER — POTASSIUM CHLORIDE 20 MEQ
40 PACKET (EA) ORAL EVERY 4 HOURS
Refills: 0 | Status: COMPLETED | OUTPATIENT
Start: 2024-05-31 | End: 2024-05-31

## 2024-05-31 RX ORDER — POTASSIUM CHLORIDE 20 MEQ
10 PACKET (EA) ORAL EVERY 4 HOURS
Refills: 0 | Status: DISCONTINUED | OUTPATIENT
Start: 2024-05-31 | End: 2024-05-31

## 2024-05-31 RX ORDER — POTASSIUM CHLORIDE 20 MEQ
10 PACKET (EA) ORAL ONCE
Refills: 0 | Status: COMPLETED | OUTPATIENT
Start: 2024-05-31 | End: 2024-05-31

## 2024-05-31 RX ORDER — MUPIROCIN 20 MG/G
1 OINTMENT TOPICAL
Refills: 0 | Status: DISCONTINUED | OUTPATIENT
Start: 2024-05-31 | End: 2024-06-02

## 2024-05-31 RX ORDER — POTASSIUM CHLORIDE 20 MEQ
10 PACKET (EA) ORAL
Refills: 0 | Status: COMPLETED | OUTPATIENT
Start: 2024-05-31 | End: 2024-05-31

## 2024-05-31 RX ORDER — POTASSIUM CHLORIDE 20 MEQ
40 PACKET (EA) ORAL ONCE
Refills: 0 | Status: DISCONTINUED | OUTPATIENT
Start: 2024-05-31 | End: 2024-05-31

## 2024-05-31 RX ORDER — PANTOPRAZOLE SODIUM 20 MG/1
40 TABLET, DELAYED RELEASE ORAL DAILY
Refills: 0 | Status: DISCONTINUED | OUTPATIENT
Start: 2024-05-31 | End: 2024-06-02

## 2024-05-31 RX ADMIN — Medication 40 MILLIEQUIVALENT(S): at 12:32

## 2024-05-31 RX ADMIN — Medication 25 MILLIGRAM(S): at 13:14

## 2024-05-31 RX ADMIN — ENOXAPARIN SODIUM 60 MILLIGRAM(S): 100 INJECTION SUBCUTANEOUS at 05:37

## 2024-05-31 RX ADMIN — Medication 25 MILLIGRAM(S): at 21:52

## 2024-05-31 RX ADMIN — Medication 25 MILLIGRAM(S): at 05:37

## 2024-05-31 RX ADMIN — PIPERACILLIN AND TAZOBACTAM 25 GRAM(S): 4; .5 INJECTION, POWDER, LYOPHILIZED, FOR SOLUTION INTRAVENOUS at 05:37

## 2024-05-31 RX ADMIN — PANTOPRAZOLE SODIUM 40 MILLIGRAM(S): 20 TABLET, DELAYED RELEASE ORAL at 13:30

## 2024-05-31 RX ADMIN — Medication 240 MILLIGRAM(S): at 05:37

## 2024-05-31 RX ADMIN — PIPERACILLIN AND TAZOBACTAM 25 GRAM(S): 4; .5 INJECTION, POWDER, LYOPHILIZED, FOR SOLUTION INTRAVENOUS at 13:30

## 2024-05-31 RX ADMIN — Medication 10 MILLIGRAM(S): at 12:32

## 2024-05-31 RX ADMIN — Medication 100 MILLIEQUIVALENT(S): at 12:31

## 2024-05-31 RX ADMIN — GABAPENTIN 400 MILLIGRAM(S): 400 CAPSULE ORAL at 21:52

## 2024-05-31 RX ADMIN — MUPIROCIN 1 APPLICATION(S): 20 OINTMENT TOPICAL at 18:32

## 2024-05-31 RX ADMIN — PIPERACILLIN AND TAZOBACTAM 25 GRAM(S): 4; .5 INJECTION, POWDER, LYOPHILIZED, FOR SOLUTION INTRAVENOUS at 21:52

## 2024-05-31 RX ADMIN — GABAPENTIN 400 MILLIGRAM(S): 400 CAPSULE ORAL at 05:37

## 2024-05-31 RX ADMIN — ENOXAPARIN SODIUM 60 MILLIGRAM(S): 100 INJECTION SUBCUTANEOUS at 18:33

## 2024-05-31 RX ADMIN — Medication 100 MILLIEQUIVALENT(S): at 21:52

## 2024-05-31 RX ADMIN — Medication 100 MILLIEQUIVALENT(S): at 19:10

## 2024-05-31 RX ADMIN — MIRTAZAPINE 7.5 MILLIGRAM(S): 45 TABLET, ORALLY DISINTEGRATING ORAL at 12:32

## 2024-05-31 RX ADMIN — Medication 40 MILLIEQUIVALENT(S): at 18:32

## 2024-05-31 RX ADMIN — GABAPENTIN 400 MILLIGRAM(S): 400 CAPSULE ORAL at 13:14

## 2024-05-31 NOTE — PROGRESS NOTE ADULT - NS ATTEND AMEND GEN_ALL_CORE FT
75 yo M with PMHx of TBI , Afib on Eliquis, BPH, CAD, recurrent Aspiration PNA, Peripheral Neuropathy, and Depression presents to the ED with fever and elevated blood pressures, admitted for suspected aspiration pna.     - Known history of a.fib, takes Cardizem  QD at home, now resumed  - S/p S&S eval   - s/p Cardizem gtt 5mg/hr overnight; continue the gtt, now back on PO regimen  - Currently on Lovenox, resume home AC if hgb remains stable  - Monitor on tele  - Back on PO hydralzine, BPs stable  - Needs aggressive K repletion, 2.9 this AM

## 2024-05-31 NOTE — SWALLOW VFSS/MBS ASSESSMENT ADULT - RECOMMENDED CONSISTENCY
1. Soft and bite sized with moderately thick liquids as tolerated   2. Liquids via single cup sips only Moderate pharyngeal dysphagia noted for thin liquids and mildly thick liquids marked by reduced tongue base retraction, epiglottic retroflexion, hyolaryngeal excursion/elevation, and airway closure resulting in mild residue along the tongue base, valleculae, posterior pharyngeal wall, and pyriform sinuses as well as silent laryngeal penetration above the level of the vocal cords with and without retrieval and contacting the vocal cords without retrieval. No aspiration viewed.  Postures were unable to be attempted given pt with limited ROM of head and neck and difficulty following directions. It is suspected that the pt's cognition is further impacting the pt's swallow function.   Of note, a MBS is a moment in time and does not capture the pt's swallow function over the course of a meal.    Recommended consistencies:   1. Soft and bite sized with moderately thick liquids as tolerated   2. Liquids via single cup sips only Mild pharyngeal dysphagia noted with moderately thick liquids marked by reduced tongue base retraction, epiglottic retroflexion, pharyngeal contractility, and hyolaryngeal excursion/elevation resulting in mild residue along the tongue base, valleculae, pyriform sinuses, and posterior pharyngeal wall which reduced with subsequent swallows as well as inconsistent trace silent laryngeal penetration above the level of the vocal cords with retrieval. One instance of trace silent laryngeal penetration was noted above the level of the vocal cords without retrieval. No aspiration viewed.  Moderate pharyngeal dysphagia noted for thin liquids and mildly thick liquids marked by reduced tongue base retraction, pharyngeal contractility, epiglottic retroflexion, hyolaryngeal excursion/elevation, and airway closure resulting in mild residue along the tongue base, valleculae, posterior pharyngeal wall, and pyriform sinuses which reduced with subsequent swallows as well as silent laryngeal penetration above the level of the vocal cords with and without retrieval and contacting the vocal cords without retrieval. No aspiration viewed.  Postures were unable to be attempted given pt with limited ROM of head and neck and difficulty following directions. It is suspected that the pt's cognition is further impacting the pt's swallow function.   Of note, a MBS is a moment in time and does not capture the pt's swallow function over the course of a meal.    Recommended consistencies:   1. Soft and bite sized with moderately thick liquids as tolerated   2. Liquids via single cup sips only -Mild pharyngeal dysphagia noted with moderately thick liquids marked by reduced tongue base retraction, epiglottic retroflexion, pharyngeal contractility, and hyolaryngeal excursion/elevation resulting in mild residue along the tongue base, valleculae, pyriform sinuses, and posterior pharyngeal wall which reduced with subsequent swallows as well as inconsistent trace silent laryngeal penetration above the level of the vocal cords with retrieval. One instance of trace silent laryngeal penetration was noted above the level of the vocal cords without retrieval. No aspiration viewed.  -Moderate pharyngeal dysphagia noted for thin liquids and mildly thick liquids marked by reduced tongue base retraction, pharyngeal contractility, epiglottic retroflexion, hyolaryngeal excursion/elevation, and airway closure resulting in mild residue along the tongue base, valleculae, posterior pharyngeal wall, and pyriform sinuses which reduced with subsequent swallows as well as silent laryngeal penetration above the level of the vocal cords with and without retrieval and contacting the vocal cords without retrieval. No aspiration viewed.  - Reduced UES opening was appreciated.   -Postures were unable to be attempted given pt with limited ROM of head and neck and difficulty following directions. It is suspected that the pt's cognition is further impacting the pt's overall swallow profile.  -Of note, a MBS is a moment in time and does not capture the pt's swallow function over the course of a meal.    Recommended consistencies:   1. Soft and bite sized with moderately thick liquids as tolerated   2. Liquids via single cup sips only

## 2024-05-31 NOTE — CONSULT NOTE ADULT - SUBJECTIVE AND OBJECTIVE BOX
[facility], Division of Infectious Diseases  LADONNA Ramirez S. Shah, Y. Patel, G. [facility]  [phone]    MELBA PARMAR  74y, Male  [id number]    HPI--  HPI:  75 yo M with PMHx of TBI , Afib on Eliquis, BPH, CAD, recurrent Aspiration PNA, Peripheral Neuropathy, and Depression presents to the ED with fever and elevated blood pressures. Wife, Mony, at bedside states a productive cough began yesterday afternoon. Today, pt felt warm so she took pt's vitals: , O2 sat 93%, /106, no temperature taken. Called nursing agency who recommended going to the ED for evaluation. Wife endorses pt has been constipated since 5/25, however this is common. Pt baseline status is AOx2-3, selectively conversational depending on his mood, able to swallow, unable to use bilateral upper and lower extremities, unable to ambulate, cannot sit up himself. Pt does not engage with provider during encounter.     ED Course:   Vitals: BP: 162/104, HR: 105, Temp: 102, RR: 16, SpO2: 94% on RA    Labs: WBC 14.96, Lactate 1.6, H/H 11.6/35.4   CXR: grossly clear right lung field with minimal costophrenic blunting on left as per personal read, official read pending   Received in the ED: Tylenol Suppository x1, Zosyn 3.375 mg IVPB, NS 1.9L bolus    (29 May 2024 22:07)        Active Medications--  bisacodyl Suppository 10 milliGRAM(s) Rectal daily  diltiazem    milliGRAM(s) Oral daily  enoxaparin Injectable 60 milliGRAM(s) SubCutaneous every 12 hours  gabapentin 400 milliGRAM(s) Oral three times a day  hydrALAZINE 25 milliGRAM(s) Oral three times a day  latanoprost 0.005% Ophthalmic Solution 1 Drop(s) Both EYES at bedtime  mirtazapine 7.5 milliGRAM(s) Oral daily  ondansetron Injectable 4 milliGRAM(s) IV Push every 8 hours PRN  pantoprazole   Suspension 40 milliGRAM(s) Oral daily  piperacillin/tazobactam IVPB.. 3.375 Gram(s) IV Intermittent every 8 hours  potassium chloride    Tablet ER 40 milliEquivalent(s) Oral every 4 hours  potassium chloride    Tablet ER 40 milliEquivalent(s) Oral once  potassium chloride  10 mEq/50 mL IVPB 10 milliEquivalent(s) IV Intermittent every 4 hours  sodium chloride 0.9%. 1000 milliLiter(s) IV Continuous <Continuous>    Antimicrobials:   piperacillin/tazobactam IVPB.. 3.375 Gram(s) IV Intermittent every 8 hours    Immunologic:     ROS:  CONSTITUTIONAL: No fevers or chills. No weakness or headache. No weight changes.  EYES/ENT: No visual or hearing changes. No sore throat or throat pain .  NECK: No pain or stiffness  RESPIRATORY: No cough, wheezing, or hemoptysis. No shortness of breath  CARDIOVASCULAR: No chest pain or palpitations  GASTROINTESTINAL: No abdominal pain. No nausea or vomiting. No diarrhea or constipation.  GENITOURINARY: No dysuria, frequency or hematuria  NEUROLOGICAL: No numbness or weakness  SKIN: No itching or rashes  PSYCHIATRIC: Pleasant. Appropriate affect    Allergies: No Known Allergies    PMH -- TBI (traumatic brain injury)    HTN (hypertension)    Atrial fibrillation    DM (diabetes mellitus)    Peripheral neuropathy    Major depression    HLD (hyperlipidemia)    CAD (coronary artery disease)    BPH (benign prostatic hyperplasia)    Pneumonia due to COVID-19 virus    Hemorrhage in the brain      PSH -- No significant past surgical history    No significant past surgical history    H/O craniotomy      FH -- No pertinent family history in first degree relatives    FH: HTN (hypertension) (Father, Mother, Sibling)    Family history of bone cancer (Sibling)    FH: Alzheimers disease (Sibling)      Social History --  EtOH: denies   Tobacco: denies   Drug Use: denies     Travel/Environmental/Occupational History:    Physical Exam--  Vital Signs Last 24 Hrs  T(F): 98.8 (31 May 2024 12:28), Max: 98.8 (31 May 2024 12:28)  HR: 74 (31 May 2024 12:28) (68 - 76)  BP: 130/71 (31 May 2024 12:28) (130/71 - 150/78)  RR: 18 (31 May 2024 12:28) (17 - 18)  SpO2: 98% (31 May 2024 12:28) (94% - 99%)  General: nontoxic-appearing, no acute distress  HEENT: NC/AT, EOMI, anicteric, conjunctiva pink and moist, oropharynx clear, dentition fair  Neck: Not rigid. No sense of mass. No LAD  Lungs: Clear bilaterally without rales, wheezing or rhonchi  Heart: Regular rate and rhythm. No murmur, rub or gallop.  Abdomen: Soft. Nondistended. Nontender. Bowel sounds present. No organomegaly.  Back: No spinal tenderness. No costovertebral angle tenderness.  Extremities: No cyanosis or clubbing. No edema.   Skin: Warm. Dry. Good turgor. No rash. No vasculitic stigmata.  Psychiatric: Appropriate affect and mood for situation.   Lines:    Laboratory & Imaging Data:  CBC:                       9.0    6.05  )-----------( 181      ( 31 May 2024 07:32 )             28.0     CMP: 05-31    143  |  113<H>  |  11  ----------------------------<  128<H>  2.9<LL>   |  26  |  0.95    Ca    8.3<L>      31 May 2024 07:32  Phos  2.6     05-31  Mg     1.8     05-31    TPro  5.7<L>  /  Alb  2.7<L>  /  TBili  0.3  /  DBili  x   /  AST  9<L>  /  ALT  10<L>  /  AlkPhos  66  05-31    LIVER FUNCTIONS - ( 31 May 2024 07:32 )  Alb: 2.7 g/dL / Pro: 5.7 g/dL / ALK PHOS: 66 U/L / ALT: 10 U/L / AST: 9 U/L / GGT: x           Urinalysis Basic - ( 31 May 2024 07:32 )    Color: x / Appearance: x / SG: x / pH: x  Gluc: 128 mg/dL / Ketone: x  / Bili: x / Urobili: x   Blood: x / Protein: x / Nitrite: x   Leuk Esterase: x / RBC: x / WBC x   Sq Epi: x / Non Sq Epi: x / Bacteria: x        Microbiology: reviewed    Culture - Blood (collected 05-29-24 @ 20:45)  Source: .Blood Blood-Peripheral  Preliminary Report (05-31-24 @ 01:03):    No growth at 24 hours    Culture - Blood (collected 05-29-24 @ 20:30)  Source: .Blood Blood-Peripheral  Preliminary Report (05-31-24 @ 01:03):    No growth at 24 hours          Radiology: reviewed    < from: Xray Chest 1 View- PORTABLE-Urgent (05.29.24 @ 21:33) >    ACC: [id number] EXAM:  XR CHEST PORTABLE URGENT 1V   ORDERED BY: TRINITY DOTSON     PROCEDURE DATE:  05/29/2024          INTERPRETATION:  Portable AP chest radiograph    COMPARISON: 11/19/2023 chest x-ray.    CLINICAL INFORMATION: Sepsis.    FINDINGS:  CATHETERS AND TUBES: None    PULMONARY:  No airspace consolidations or radiographic evidence of pulmonary nodules..  No pleural effusion or pneumothorax.    HEART/VASCULAR: The  heart is mildly enlarged in transverse diameter. No   hilar mass..    BONES: The visualized osseous thorax is intact.    IMPRESSION:  No radiographic evidence of active chest disease..  Lateral radiograph recommended to evaluate posterior lung bases.    --- End of Report ---            SUSU FERREIRA MD; Attending Radiologist  This document has been electronically signed. May 30 2024  9:39AM    < end of copied text >   [facility], Division of Infectious Diseases  LADONNA Ramirez S. Shah, Y. Patel, G. [facility]  [phone]    MELBA PARMAR  74y, Male  [id number]    HPI--  HPI:  75 yo M with PMHx of TBI , Afib on Eliquis, BPH, CAD, recurrent Aspiration PNA, Peripheral Neuropathy, and Depression presents to the ED with fever and elevated blood pressures. Wife, Mony, at bedside states a productive cough began yesterday afternoon. Today, pt felt warm so she took pt's vitals: , O2 sat 93%, /106, no temperature taken. Called nursing agency who recommended going to the ED for evaluation. Wife endorses pt has been constipated since 5/25, however this is common. Pt baseline status is AOx2-3, selectively conversational depending on his mood, able to swallow, unable to use bilateral upper and lower extremities, unable to ambulate, cannot sit up himself. Pt does not engage with provider during encounter.     ED Course:   Vitals: BP: 162/104, HR: 105, Temp: 102, RR: 16, SpO2: 94% on RA    Labs: WBC 14.96, Lactate 1.6, H/H 11.6/35.4   CXR: grossly clear right lung field with minimal costophrenic blunting on left as per personal read, official read pending   Received in the ED: Tylenol Suppository x1, Zosyn 3.375 mg IVPB, NS 1.9L bolus    (29 May 2024 22:07)        Active Medications--  bisacodyl Suppository 10 milliGRAM(s) Rectal daily  diltiazem    milliGRAM(s) Oral daily  enoxaparin Injectable 60 milliGRAM(s) SubCutaneous every 12 hours  gabapentin 400 milliGRAM(s) Oral three times a day  hydrALAZINE 25 milliGRAM(s) Oral three times a day  latanoprost 0.005% Ophthalmic Solution 1 Drop(s) Both EYES at bedtime  mirtazapine 7.5 milliGRAM(s) Oral daily  ondansetron Injectable 4 milliGRAM(s) IV Push every 8 hours PRN  pantoprazole   Suspension 40 milliGRAM(s) Oral daily  piperacillin/tazobactam IVPB.. 3.375 Gram(s) IV Intermittent every 8 hours  potassium chloride    Tablet ER 40 milliEquivalent(s) Oral every 4 hours  potassium chloride    Tablet ER 40 milliEquivalent(s) Oral once  potassium chloride  10 mEq/50 mL IVPB 10 milliEquivalent(s) IV Intermittent every 4 hours  sodium chloride 0.9%. 1000 milliLiter(s) IV Continuous <Continuous>    Antimicrobials:   piperacillin/tazobactam IVPB.. 3.375 Gram(s) IV Intermittent every 8 hours    Immunologic:     ROS:  unable to obtain    Allergies: No Known Allergies    PMH -- TBI (traumatic brain injury)    HTN (hypertension)    Atrial fibrillation    DM (diabetes mellitus)    Peripheral neuropathy    Major depression    HLD (hyperlipidemia)    CAD (coronary artery disease)    BPH (benign prostatic hyperplasia)    Pneumonia due to COVID-19 virus    Hemorrhage in the brain      PSH -- No significant past surgical history    No significant past surgical history    H/O craniotomy      FH -- No pertinent family history in first degree relatives    FH: HTN (hypertension) (Father, Mother, Sibling)    Family history of bone cancer (Sibling)    FH: Alzheimers disease (Sibling)      Social History --  EtOH: denies   Tobacco: denies   Drug Use: denies     Travel/Environmental/Occupational History:    Physical Exam--  Vital Signs Last 24 Hrs  T(F): 98.8 (31 May 2024 12:28), Max: 98.8 (31 May 2024 12:28)  HR: 74 (31 May 2024 12:28) (68 - 76)  BP: 130/71 (31 May 2024 12:28) (130/71 - 150/78)  RR: 18 (31 May 2024 12:28) (17 - 18)  SpO2: 98% (31 May 2024 12:28) (94% - 99%)  General: nontoxic-appearing, no acute distress  HEENT: NC/AT,   Lungs: Clear bilaterally without rales, wheezing or rhonchi  Heart: Regular rate and rhythm. No murmur, rub or gallop.  Abdomen: Soft. Nondistended. Nontender.   Extremities: No cyanosis or clubbing. No edema.   Skin: Warm. Dry. Good turgor.     Laboratory & Imaging Data:  CBC:                       9.0    6.05  )-----------( 181      ( 31 May 2024 07:32 )             28.0     CMP: 05-31    143  |  113<H>  |  11  ----------------------------<  128<H>  2.9<LL>   |  26  |  0.95    Ca    8.3<L>      31 May 2024 07:32  Phos  2.6     05-31  Mg     1.8     05-31    TPro  5.7<L>  /  Alb  2.7<L>  /  TBili  0.3  /  DBili  x   /  AST  9<L>  /  ALT  10<L>  /  AlkPhos  66  05-31    LIVER FUNCTIONS - ( 31 May 2024 07:32 )  Alb: 2.7 g/dL / Pro: 5.7 g/dL / ALK PHOS: 66 U/L / ALT: 10 U/L / AST: 9 U/L / GGT: x           Urinalysis Basic - ( 31 May 2024 07:32 )    Color: x / Appearance: x / SG: x / pH: x  Gluc: 128 mg/dL / Ketone: x  / Bili: x / Urobili: x   Blood: x / Protein: x / Nitrite: x   Leuk Esterase: x / RBC: x / WBC x   Sq Epi: x / Non Sq Epi: x / Bacteria: x        Microbiology: reviewed    Culture - Blood (collected 05-29-24 @ 20:45)  Source: .Blood Blood-Peripheral  Preliminary Report (05-31-24 @ 01:03):    No growth at 24 hours    Culture - Blood (collected 05-29-24 @ 20:30)  Source: .Blood Blood-Peripheral  Preliminary Report (05-31-24 @ 01:03):    No growth at 24 hours          Radiology: reviewed    < from: Xray Chest 1 View- PORTABLE-Urgent (05.29.24 @ 21:33) >    ACC: [id number] EXAM:  XR CHEST PORTABLE URGENT 1V   ORDERED BY: TRINITY DOTSON     PROCEDURE DATE:  05/29/2024          INTERPRETATION:  Portable AP chest radiograph    COMPARISON: 11/19/2023 chest x-ray.    CLINICAL INFORMATION: Sepsis.    FINDINGS:  CATHETERS AND TUBES: None    PULMONARY:  No airspace consolidations or radiographic evidence of pulmonary nodules..  No pleural effusion or pneumothorax.    HEART/VASCULAR: The  heart is mildly enlarged in transverse diameter. No   hilar mass..    BONES: The visualized osseous thorax is intact.    IMPRESSION:  No radiographic evidence of active chest disease..  Lateral radiograph recommended to evaluate posterior lung bases.    --- End of Report ---            SUSU FERREIRA MD; Attending Radiologist  This document has been electronically signed. May 30 2024  9:39AM    < end of copied text >

## 2024-05-31 NOTE — SWALLOW VFSS/MBS ASSESSMENT ADULT - DIAGNOSTIC IMPRESSIONS
Mild oral dysphagia noted marked by reduced lingual ROM/strength/coordination resulting in mildly prolonged AP transport for and posterior loss of the bolus to the oropharynx for puree and to the hypopharynx for soft and bite sized, mildly thick liquids, and moderately thick liquids. Moderate-severe oral dysphagia noted for thin liquids marked by inconsistent prolonged bolus holding despite max multi-modality cueing and reduced lingual ROM/strength/coordination resulting in posterior loss of the bolus to the hypopharynx. Pt benefited from continued max multi-modality cueing to initiate a swallow.    Mild-moderate oral dysphagia noted for regular solids marked by prolonged mastication and reduced lingual ROM/strength/coordination resulting in prolonged oral transit and AP transport and posterior loss of the bolus to the oropharynx.   Mild pharyngeal dysphagia noted for puree, soft and bite sized, and regular solids marked by reduced tongue base retraction, epiglottic retroflexion, hyolaryngeal excursion/elevation resulting in trace-mild residue in the valleculae and pyriform sinuses for puree and soft and bite sized, mild-mod residue in the valleculae and mild residue in the pyriform sinuses for regular solids, as well as inconsistent trace silent laryngeal penetration above the level of the vocal cords with retrieval. No aspiration viewed. Pharyngeal residue was noted to reduce with subsequent swallows.   Mild pharyngeal dysphagia noted with moderately thick liquids marked by reduced tongue base retraction, epiglottic retroflexion, hyolaryngeal excursion/elevation resulting in mild residue along the tongue base, valleculae, pyriform sinuses, and posterior pharyngeal wall which reduced with subsequent swallows as well as inconsistent trace silent laryngeal penetration above the level of the vocal cords with retrieval. One instance of trace silent laryngeal penetration was noted above the level of the vocal cords without retrieval. No aspiration viewed. Mild oral dysphagia noted marked by reduced lingual ROM/strength/coordination resulting in mildly prolonged AP transport for and posterior loss of the bolus to the oropharynx puree and to the hypopharynx for soft and bite sized, mildly thick liquids, and moderately thick liquids. Moderate-severe oral dysphagia noted for thin liquids marked by inconsistent prolonged bolus holding despite max multi-modality cueing and reduced lingual ROM/strength/coordination resulting in posterior loss of the bolus to the hypopharynx. Pt benefited from continued max multi-modality cueing to initiate a swallow.    Mild-moderate oral dysphagia noted for regular solids marked by prolonged mastication and reduced lingual ROM/strength/coordination resulting in prolonged oral transit and AP transport and posterior loss of the bolus to the oropharynx.   Mild pharyngeal dysphagia noted for puree, soft and bite sized, and regular solids marked by reduced tongue base retraction, epiglottic retroflexion, hyolaryngeal excursion/elevation resulting in trace-mild residue in the valleculae and pyriform sinuses for puree and soft and bite sized, mild-mod residue in the valleculae and mild residue in the pyriform sinuses for regular solids, as well as inconsistent trace silent laryngeal penetration above the level of the vocal cords with retrieval. No aspiration viewed. Pharyngeal residue was noted to reduce with subsequent swallows.   Mild pharyngeal dysphagia noted with moderately thick liquids marked by reduced tongue base retraction, epiglottic retroflexion, hyolaryngeal excursion/elevation resulting in mild residue along the tongue base, valleculae, pyriform sinuses, and posterior pharyngeal wall which reduced with subsequent swallows as well as inconsistent trace silent laryngeal penetration above the level of the vocal cords with retrieval. One instance of trace silent laryngeal penetration was noted above the level of the vocal cords without retrieval. No aspiration viewed. Mild oral dysphagia noted marked by reduced lingual ROM/strength/coordination resulting in mildly prolonged AP transport and posterior loss of the bolus to the oropharynx for puree and to the hypopharynx for soft and bite sized, mildly thick liquids, and moderately thick liquids.  Moderate-severe oral dysphagia noted for thin liquids marked by inconsistent prolonged bolus holding despite max multi-modality cueing and reduced lingual ROM/strength/coordination resulting in posterior loss of the bolus to the hypopharynx. Pt benefited from continued max multi-modality cueing to initiate a swallow.    Mild-moderate oral dysphagia noted for regular solids marked by prolonged mastication and reduced lingual ROM/strength/coordination resulting in prolonged oral transit and AP transport and posterior loss of the bolus to the oropharynx.   Mild pharyngeal dysphagia noted for puree, soft and bite sized, and regular solids marked by reduced tongue base retraction, epiglottic retroflexion, hyolaryngeal excursion/elevation resulting in trace-mild residue in the valleculae and pyriform sinuses for puree and soft and bite sized, mild-mod residue in the valleculae and mild residue in the pyriform sinuses for regular solids, as well as inconsistent trace silent laryngeal penetration above the level of the vocal cords with retrieval. No aspiration viewed. Pharyngeal residue was noted to reduce with subsequent swallows. -Mild oral dysphagia noted marked by reduced lingual ROM/strength/coordination resulting in mildly prolonged AP transport and posterior loss of the bolus to the oropharynx for puree and to the hypopharynx for soft and bite sized, mildly thick liquids, and moderately thick liquids.  -Moderate-severe oral dysphagia noted for thin liquids marked by inconsistent prolonged bolus holding despite max multi-modality cueing and reduced lingual ROM/strength/coordination resulting in posterior loss of the bolus to the hypopharynx. Pt benefited from continued max multi-modality cueing to initiate a swallow.    -Mild-moderate oral dysphagia noted for regular solids marked by prolonged mastication and reduced lingual ROM/strength/coordination resulting in prolonged oral transit and AP transport and posterior loss of the bolus to the oropharynx.   -Mild pharyngeal dysphagia noted for puree, soft and bite sized, and regular solids marked by reduced tongue base retraction, epiglottic retroflexion, hyolaryngeal excursion/elevation resulting in trace-mild residue in the valleculae and pyriform sinuses for puree and soft and bite sized, mild-mod residue in the valleculae and mild residue in the pyriform sinuses for regular solids, as well as inconsistent trace silent laryngeal penetration above the level of the vocal cords with retrieval. No aspiration viewed. Pharyngeal residue was noted to reduce with subsequent swallows.

## 2024-05-31 NOTE — PROGRESS NOTE ADULT - SUBJECTIVE AND OBJECTIVE BOX
MELBA PARMAR  74y  Male      HPI:  75 yo M with PMHx of TBI , Afib on Eliquis, BPH, CAD, recurrent Aspiration PNA, Peripheral Neuropathy, and Depression presents to the ED with fever and elevated blood pressures. Wife, Mony, at bedside states a productive cough began yesterday afternoon. Today, pt felt warm so she took pt's vitals: , O2 sat 93%, /106, no temperature taken. Called nursing agency who recommended going to the ED for evaluation. Wife endorses pt has been constipated since 5/25, however this is common. Pt baseline status is AOx2-3, selectively conversational depending on his mood, able to swallow, unable to use bilateral upper and lower extremities, unable to ambulate, cannot sit up himself. Pt does not engage with provider during encounter.     ED Course:   Vitals: BP: 162/104, HR: 105, Temp: 102, RR: 16, SpO2: 94% on RA    Labs: WBC 14.96, Lactate 1.6, H/H 11.6/35.4   CXR: grossly clear right lung field with minimal costophrenic blunting on left as per personal read, official read pending         INTERVAL HPI/OVERNIGHT EVENTS:   Pt seen and examined at bed side this morning. He denied any new complains.   BP is controlled today. afebrile   noted to have low potassium        REVIEW OF SYSTEMS:  CONSTITUTIONAL: No fever, weight loss, or fatigue  EYES: No eye pain, visual disturbances, or discharge  ENMT:  No difficulty hearing, tinnitus, vertigo; No sinus or throat pain  RESPIRATORY: No cough, wheezing, chills or hemoptysis; No shortness of breath  CARDIOVASCULAR: No chest pain, palpitations, dizziness, or leg swelling  GASTROINTESTINAL: No abdominal or epigastric pain. No nausea, vomiting, or hematemesis; No diarrhea or constipation. No melena or hematochezia.  GENITOURINARY: No dysuria, frequency, hematuria, or incontinence  NEUROLOGICAL: No headaches, memory loss, loss of strength, numbness, or tremors  ENDOCRINE: No heat or cold intolerance; No hair loss  MUSCULOSKELETAL: No joint pain or swelling; No muscle, back, or extremity pain    T(C): 37.1 (05-31-24 @ 12:28), Max: 37.1 (05-31-24 @ 12:28)  HR: 74 (05-31-24 @ 12:28) (68 - 76)  BP: 130/71 (05-31-24 @ 12:28) (130/71 - 150/78)  RR: 18 (05-31-24 @ 12:28) (17 - 18)  SpO2: 98% (05-31-24 @ 12:28) (94% - 99%)  Wt(kg): --Vital Signs Last 24 Hrs  T(C): 37.1 (31 May 2024 12:28), Max: 37.1 (31 May 2024 12:28)  T(F): 98.8 (31 May 2024 12:28), Max: 98.8 (31 May 2024 12:28)  HR: 74 (31 May 2024 12:28) (68 - 76)  BP: 130/71 (31 May 2024 12:28) (130/71 - 150/78)  BP(mean): --  RR: 18 (31 May 2024 12:28) (17 - 18)  SpO2: 98% (31 May 2024 12:28) (94% - 99%)    Parameters below as of 31 May 2024 12:28  Patient On (Oxygen Delivery Method): nasal cannula  O2 Flow (L/min): 2      PHYSICAL EXAM:  GENERAL: elderly male, NAD   HEAD:  Atraumatic, Normocephalic  EYES: EOMI, PERRLA, conjunctiva and sclera clear  ENMT: No tonsillar erythema, exudates, or enlargement; Moist mucous membranes, Good dentition, No lesions  NERVOUS SYSTEM:  Alert & Oriented X 2. following some simple commands   CHEST/LUNG: adequate BS bilaterally; No rales, rhonchi, wheezing, or rubs  HEART: Regular rate and rhythm; No murmurs, rubs, or gallops  ABDOMEN: Soft, Nontender, Nondistended; Bowel sounds present  EXTREMITIES:  2+ Peripheral Pulses, No clubbing, cyanosis, or edema. rash noted with scab and superficial ulcer over b/l LE       Consultant(s) Notes Reviewed:  [x ] YES  [ ] NO  Care Discussed with Consultants/Other Providers [ x] YES  [ ] NO    LABS:                        9.0    6.05  )-----------( 181      ( 31 May 2024 07:32 )             28.0     05-31    143  |  113<H>  |  11  ----------------------------<  128<H>  2.9<LL>   |  26  |  0.95    Ca    8.3<L>      31 May 2024 07:32  Phos  2.6     05-31  Mg     1.8     05-31    TPro  5.7<L>  /  Alb  2.7<L>  /  TBili  0.3  /  DBili  x   /  AST  9<L>  /  ALT  10<L>  /  AlkPhos  66  05-31    PT/INR - ( 29 May 2024 20:45 )   PT: 13.2 sec;   INR: 1.13 ratio         PTT - ( 29 May 2024 20:45 )  PTT:34.4 sec  Urinalysis Basic - ( 31 May 2024 07:32 )    Color: x / Appearance: x / SG: x / pH: x  Gluc: 128 mg/dL / Ketone: x  / Bili: x / Urobili: x   Blood: x / Protein: x / Nitrite: x   Leuk Esterase: x / RBC: x / WBC x   Sq Epi: x / Non Sq Epi: x / Bacteria: x      CAPILLARY BLOOD GLUCOSE            Urinalysis Basic - ( 31 May 2024 07:32 )    Color: x / Appearance: x / SG: x / pH: x  Gluc: 128 mg/dL / Ketone: x  / Bili: x / Urobili: x   Blood: x / Protein: x / Nitrite: x   Leuk Esterase: x / RBC: x / WBC x   Sq Epi: x / Non Sq Epi: x / Bacteria: x        RADIOLOGY & ADDITIONAL TESTS:    Imaging Personally Reviewed:  [ ] YES  [ ] NO    HEALTH ISSUES - PROBLEM Dx:  TBI (traumatic brain injury)    HTN (hypertension)    Atrial fibrillation    Peripheral neuropathy    Major depression    Need for prophylactic measure    Sepsis    Anemia    Glaucoma    Type 2 diabetes mellitus    Constipation

## 2024-05-31 NOTE — PROGRESS NOTE ADULT - SUBJECTIVE AND OBJECTIVE BOX
[facility] -- Alexey Briceno Pannella, Patel, Savella Goodger, Cohen  Office # [phone]      Follow Up:    Af  Subjective/Observations:   seen bedside, with family present , no acute overnight events   remains on NC , now tolerating PO   unable to provide ros  had history of TBO      PAST MEDICAL & SURGICAL HISTORY:  TBI (traumatic brain injury)      HTN (hypertension)      Atrial fibrillation      Peripheral neuropathy      Major depression      HLD (hyperlipidemia)      CAD (coronary artery disease)      BPH (benign prostatic hyperplasia)      Pneumonia due to COVID-19 virus  2022      Hemorrhage in the brain      H/O craniotomy          MEDICATIONS  (STANDING):  bisacodyl Suppository 10 milliGRAM(s) Rectal daily  diltiazem    milliGRAM(s) Oral daily  enoxaparin Injectable 60 milliGRAM(s) SubCutaneous every 12 hours  gabapentin 400 milliGRAM(s) Oral three times a day  hydrALAZINE 25 milliGRAM(s) Oral three times a day  latanoprost 0.005% Ophthalmic Solution 1 Drop(s) Both EYES at bedtime  mirtazapine 7.5 milliGRAM(s) Oral daily  pantoprazole    Tablet 40 milliGRAM(s) Oral before breakfast  piperacillin/tazobactam IVPB.. 3.375 Gram(s) IV Intermittent every 8 hours  potassium chloride    Tablet ER 40 milliEquivalent(s) Oral every 4 hours  potassium chloride  10 mEq/100 mL IVPB 10 milliEquivalent(s) IV Intermittent once  sodium chloride 0.9%. 1000 milliLiter(s) (100 mL/Hr) IV Continuous <Continuous>    MEDICATIONS  (PRN):  ondansetron Injectable 4 milliGRAM(s) IV Push every 8 hours PRN Nausea and/or Vomiting      Allergies    No Known Allergies    Intolerances        Vital Signs Last 24 Hrs  T(C): 36.8 (31 May 2024 04:50), Max: 36.9 (30 May 2024 18:45)  T(F): 98.3 (31 May 2024 04:50), Max: 98.4 (30 May 2024 18:45)  HR: 76 (31 May 2024 04:50) (68 - 76)  BP: 139/85 (31 May 2024 04:50) (124/81 - 150/78)  BP(mean): --  RR: 18 (31 May 2024 04:50) (16 - 18)  SpO2: 99% (31 May 2024 04:50) (94% - 99%)    Parameters below as of 31 May 2024 04:50  Patient On (Oxygen Delivery Method): nasal cannula  O2 Flow (L/min): 3      I&O's Summary    30 May 2024 07:01  -  31 May 2024 07:00  --------------------------------------------------------  IN: 100 mL / OUT: 900 mL / NET: -800 mL      Weight (kg): 60.6 ( @ 18:45)    PHYSICAL EXAM:  TELE: af  Constitutional: NAD, awake and alert, well-developed  HEENT: Moist Mucous Membranes, Anicteric  Pulmonary: Non-labored, breath sounds are clear bilaterally, No wheezing, crackles or rhonchi  Cardiovascular: IRegular, S1 and S2 nl, No murmurs, rubs, gallops or clicks  Gastrointestinal: Bowel Sounds present, soft, nontender.   Lymph: +peripheral edema.  Skin: No visible rashes or ulcers.    LABS: All Labs Reviewed:                        9.0    6.05  )-----------( 181      ( 31 May 2024 07:32 )             28.0                         9.9    10.25 )-----------( 180      ( 30 May 2024 05:55 )             30.9                         11.6   14.96 )-----------( 205      ( 29 May 2024 20:45 )             35.4     31 May 2024 07:32    143    |  113    |  11     ----------------------------<  128    2.9     |  26     |  0.95   30 May 2024 05:55    143    |  112    |  13     ----------------------------<  122    3.1     |  26     |  0.89   29 May 2024 20:45    137    |  103    |  14     ----------------------------<  121    3.9     |  27     |  1.10     Ca    8.3        31 May 2024 07:32  Ca    8.4        30 May 2024 05:55  Ca    8.9        29 May 2024 20:45  Phos  2.6       31 May 2024 07:32  Mg     1.8       31 May 2024 07:32    TPro  5.7    /  Alb  2.7    /  TBili  0.3    /  DBili  x      /  AST  9      /  ALT  10     /  AlkPhos  66     31 May 2024 07:32  TPro  5.7    /  Alb  2.7    /  TBili  0.4    /  DBili  x      /  AST  8      /  ALT  13     /  AlkPhos  76     30 May 2024 05:55  TPro  7.1    /  Alb  3.4    /  TBili  0.5    /  DBili  x      /  AST  18     /  ALT  16     /  AlkPhos  95     29 May 2024 20:45    PT/INR - ( 29 May 2024 20:45 )   PT: 13.2 sec;   INR: 1.13 ratio         PTT - ( 29 May 2024 20:45 )  PTT:34.4 sec         EC Lead ECG:   Ventricular Rate 95 BPM    QRS Duration 84 ms    Q-T Interval 362 ms    QTC Calculation(Bazett) 454 ms    R Axis 242 degrees    T Axis 20 degrees    Diagnosis Line Atrial fibrillation  Right ventricular hypertrophy  Inferior infarct , age undetermined  Anterolateral infarct (cited on or before 22-MAY-2022)    Confirmed by TOMMY OGLESBY ([id number]) on 2024 7:40:26 AM (24 @ 22:54)      TRANSTHORACIC ECHOCARDIOGRAM REPORT  ________________________________________________________________________________                                      _______       Pt. Name:       MELBA PARMAR Study Date:    2023  MRN:            [id number]       YOB: 1950  Accession #:    [id number]      Age:           73 years  Account#:       [id number]     Gender:        M  Heart Rate:                    Height:        69.00 in (175.26 cm)  Rhythm:                        Weight:  134.00 lb (60.78 kg)  Blood Pressure: 120/74 mmHg    BSA/BMI:       1.74 m² / 19.79 kg/m²  ________________________________________________________________________________________  Referring Physician:    [id number] Ranulfo Boggs  Interpreting Physician: Lexi Seals  Primary Sonographer:    Celia Whitmore RDCS    CPT:               ECHO TTE WO CON COMP W DOPP - 56585.m  Indication(s):     Shock, unspecified - R57.9  Procedure:         Transthoracic echocardiogram with 2-D, M-mode and complete                     spectral and color flow Doppler.  Ordering Location: [facility]  Study Information: Image quality for this study is technically difficult.    _______________________________________________________________________________________     CONCLUSIONS:      1. Technically difficult image quality.   2. Left ventricular systolic function is normal with an ejection fraction visually estimated at 60 to 65 %.   3. The right ventricle is not well visualized.   4. The left atrium is mildly dilated in size.   5. Aortic valve was not well visualized.   6. Trace mitral regurgitation.   7. Trace tricuspid regurgitation.    ________________________________________________________________________________________  FINDINGS:     Left Ventricle:  Left ventricular systolic function is normal with an ejection fraction visually estimated at 60 to 65%.     Right Ventricle:  The right ventricle is not well visualized. Normal wall thickness.     Left Atrium:  The left atrium is mildly dilated in size.     Right Atrium:  The right atrium is normal in size.     Aortic Valve:  The aortic valve was not well visualized.     Mitral Valve:  There is trace mitral regurgitation.     Tricuspid Valve:  There is trace tricuspid regurgitation. Estimated pulmonary artery systolic pressure is 29 mmHg.     Pulmonic Valve:  The pulmonic valve was not well visualized.     Pericardium:  No pericardial effusion seen.     Systemic Veins:  The inferior vena cava is normal in size (normal <2.1cm) with normal inspiratory collapse (normal >50%) consistent with normal right atrial pressure (~3, range 0-5mmHg).  ____________________________________________________________________  Quantitative Data:  Left Ventricle Measurements: (Indexed to BSA)     IVSd (2D):   1.2 cm  LVPWd (2D):  1.2 cm  LVIDd (2D):  4.1 cm  LVIDs (2D):  2.6 cm  LV Mass:     167 g  96.0 g/m²  Visualized LV EF%: 60 to 65%     MV E Vmax:    0.73 m/s  e' lateral:   12.00 cm/s  e' medial:    8.49 cm/s  E/e' lateral: 6.12  E/e' medial:  8.65  E/e' Average: 7.16  MV DT:        120 msec       Left Atrium Measurements: (Indexed to BSA)  LA Diam 2D: 4.10 cm       LVOT / RVOT/ Qp/Qs Data: (Indexed to BSA)  LVOT Diameter: 2.00 cm    Mitral Valve Measurements:     MV E Vmax: 0.7 m/s       Tricuspid Valve Measurements:     TR Vmax:          2.5 m/s  TR Peak Gradient: 25.6 mmHg  RA Pressure:      3 mmHg  PASP:             29 mmHg    ________________________________________________________________________________________  Electronically signed on8/15/2023 at 12:43:28 PM by Lexi Seals         *** Final ***      Radiology:         [facility] -- Alexey Briceno Pannella, Patel, Savella Goodger, Cohen  Office # [phone]      Follow Up:    Af  Subjective/Observations:   seen bedside, with family present , no acute overnight events   remains on NC , now tolerating PO   unable to provide ros  had history of TBO      PAST MEDICAL & SURGICAL HISTORY:  TBI (traumatic brain injury)      HTN (hypertension)      Atrial fibrillation      Peripheral neuropathy      Major depression      HLD (hyperlipidemia)      CAD (coronary artery disease)      BPH (benign prostatic hyperplasia)      Pneumonia due to COVID-19 virus  2022      Hemorrhage in the brain      H/O craniotomy          MEDICATIONS  (STANDING):  bisacodyl Suppository 10 milliGRAM(s) Rectal daily  diltiazem    milliGRAM(s) Oral daily  enoxaparin Injectable 60 milliGRAM(s) SubCutaneous every 12 hours  gabapentin 400 milliGRAM(s) Oral three times a day  hydrALAZINE 25 milliGRAM(s) Oral three times a day  latanoprost 0.005% Ophthalmic Solution 1 Drop(s) Both EYES at bedtime  mirtazapine 7.5 milliGRAM(s) Oral daily  pantoprazole    Tablet 40 milliGRAM(s) Oral before breakfast  piperacillin/tazobactam IVPB.. 3.375 Gram(s) IV Intermittent every 8 hours  potassium chloride    Tablet ER 40 milliEquivalent(s) Oral every 4 hours  potassium chloride  10 mEq/100 mL IVPB 10 milliEquivalent(s) IV Intermittent once  sodium chloride 0.9%. 1000 milliLiter(s) (100 mL/Hr) IV Continuous <Continuous>    MEDICATIONS  (PRN):  ondansetron Injectable 4 milliGRAM(s) IV Push every 8 hours PRN Nausea and/or Vomiting      Allergies    No Known Allergies    Intolerances        Vital Signs Last 24 Hrs  T(C): 36.8 (31 May 2024 04:50), Max: 36.9 (30 May 2024 18:45)  T(F): 98.3 (31 May 2024 04:50), Max: 98.4 (30 May 2024 18:45)  HR: 76 (31 May 2024 04:50) (68 - 76)  BP: 139/85 (31 May 2024 04:50) (124/81 - 150/78)  BP(mean): --  RR: 18 (31 May 2024 04:50) (16 - 18)  SpO2: 99% (31 May 2024 04:50) (94% - 99%)    Parameters below as of 31 May 2024 04:50  Patient On (Oxygen Delivery Method): nasal cannula  O2 Flow (L/min): 3      I&O's Summary    30 May 2024 07:01  -  31 May 2024 07:00  --------------------------------------------------------  IN: 100 mL / OUT: 900 mL / NET: -800 mL      Weight (kg): 60.6 ( @ 18:45)    PHYSICAL EXAM:  TELE: af  Constitutional: NAD, awake and alert, well-developed  HEENT: Moist Mucous Membranes, Anicteric  Pulmonary: Non-labored, breath sounds are clear bilaterally, No wheezing, crackles or rhonchi  Cardiovascular: IRegular, S1 and S2 nl, No murmurs, rubs, gallops or clicks  Gastrointestinal: Bowel Sounds present, soft, nontender.   Lymph: +peripheral edema.  Skin: No visible rashes or ulcers.    LABS: All Labs Reviewed:                        9.0    6.05  )-----------( 181      ( 31 May 2024 07:32 )             28.0                         9.9    10.25 )-----------( 180      ( 30 May 2024 05:55 )             30.9                         11.6   14.96 )-----------( 205      ( 29 May 2024 20:45 )             35.4     31 May 2024 07:32    143    |  113    |  11     ----------------------------<  128    2.9     |  26     |  0.95   30 May 2024 05:55    143    |  112    |  13     ----------------------------<  122    3.1     |  26     |  0.89   29 May 2024 20:45    137    |  103    |  14     ----------------------------<  121    3.9     |  27     |  1.10     Ca    8.3        31 May 2024 07:32  Ca    8.4        30 May 2024 05:55  Ca    8.9        29 May 2024 20:45  Phos  2.6       31 May 2024 07:32  Mg     1.8       31 May 2024 07:32    TPro  5.7    /  Alb  2.7    /  TBili  0.3    /  DBili  x      /  AST  9      /  ALT  10     /  AlkPhos  66     31 May 2024 07:32  TPro  5.7    /  Alb  2.7    /  TBili  0.4    /  DBili  x      /  AST  8      /  ALT  13     /  AlkPhos  76     30 May 2024 05:55  TPro  7.1    /  Alb  3.4    /  TBili  0.5    /  DBili  x      /  AST  18     /  ALT  16     /  AlkPhos  95     29 May 2024 20:45    PT/INR - ( 29 May 2024 20:45 )   PT: 13.2 sec;   INR: 1.13 ratio         PTT - ( 29 May 2024 20:45 )  PTT:34.4 sec         EC Lead ECG:   Ventricular Rate 95 BPM    QRS Duration 84 ms    Q-T Interval 362 ms    QTC Calculation(Bazett) 454 ms    R Axis 242 degrees    T Axis 20 degrees    Diagnosis Line Atrial fibrillation  Right ventricular hypertrophy  Inferior infarct , age undetermined  Anterolateral infarct (cited on or before 22-MAY-2022)    Confirmed by TOMMY OGLESBY ([id number]) on 2024 7:40:26 AM (24 @ 22:54)      TRANSTHORACIC ECHOCARDIOGRAM REPORT  ________________________________________________________________________________                                      _______       Pt. Name:       MELBA PARMAR Study Date:    2023  MRN:            [id number]       YOB: 1950  Accession #:    [id number]      Age:           73 years  Account#:       [id number]     Gender:        M  Heart Rate:                    Height:        69.00 in (175.26 cm)  Rhythm:                        Weight:  134.00 lb (60.78 kg)  Blood Pressure: 120/74 mmHg    BSA/BMI:       1.74 m² / 19.79 kg/m²  ________________________________________________________________________________________  Referring Physician:    [id number] Ranulfo Boggs  Interpreting Physician: Lexi Seals  Primary Sonographer:    Celia Whitmore RDCS    CPT:               ECHO TTE WO CON COMP W DOPP - 56070.m  Indication(s):     Shock, unspecified - R57.9  Procedure:         Transthoracic echocardiogram with 2-D, M-mode and complete                     spectral and color flow Doppler.  Ordering Location: [facility]  Study Information: Image quality for this study is technically difficult.    _______________________________________________________________________________________     CONCLUSIONS:      1. Technically difficult image quality.   2. Left ventricular systolic function is normal with an ejection fraction visually estimated at 60 to 65 %.   3. The right ventricle is not well visualized.   4. The left atrium is mildly dilated in size.   5. Aortic valve was not well visualized.   6. Trace mitral regurgitation.   7. Trace tricuspid regurgitation.    ________________________________________________________________________________________  FINDINGS:     Left Ventricle:  Left ventricular systolic function is normal with an ejection fraction visually estimated at 60 to 65%.     Right Ventricle:  The right ventricle is not well visualized. Normal wall thickness.     Left Atrium:  The left atrium is mildly dilated in size.     Right Atrium:  The right atrium is normal in size.     Aortic Valve:  The aortic valve was not well visualized.     Mitral Valve:  There is trace mitral regurgitation.     Tricuspid Valve:  There is trace tricuspid regurgitation. Estimated pulmonary artery systolic pressure is 29 mmHg.     Pulmonic Valve:  The pulmonic valve was not well visualized.     Pericardium:  No pericardial effusion seen.     Systemic Veins:  The inferior vena cava is normal in size (normal <2.1cm) with normal inspiratory collapse (normal >50%) consistent with normal right atrial pressure (~3, range 0-5mmHg).  ____________________________________________________________________  Quantitative Data:  Left Ventricle Measurements: (Indexed to BSA)     IVSd (2D):   1.2 cm  LVPWd (2D):  1.2 cm  LVIDd (2D):  4.1 cm  LVIDs (2D):  2.6 cm  LV Mass:     167 g  96.0 g/m²  Visualized LV EF%: 60 to 65%     MV E Vmax:    0.73 m/s  e' lateral:   12.00 cm/s  e' medial:    8.49 cm/s  E/e' lateral: 6.12  E/e' medial:  8.65  E/e' Average: 7.16  MV DT:        120 msec       Left Atrium Measurements: (Indexed to BSA)  LA Diam 2D: 4.10 cm       LVOT / RVOT/ Qp/Qs Data: (Indexed to BSA)  LVOT Diameter: 2.00 cm    Mitral Valve Measurements:     MV E Vmax: 0.7 m/s       Tricuspid Valve Measurements:     TR Vmax:          2.5 m/s  TR Peak Gradient: 25.6 mmHg  RA Pressure:      3 mmHg  PASP:             29 mmHg    ________________________________________________________________________________________  Electronically signed on8/15/2023 at 12:43:28 PM by Lexi Seals         *** Final ***      Radiology:

## 2024-05-31 NOTE — SWALLOW VFSS/MBS ASSESSMENT ADULT - ORAL PHASE
Prolonged AP transport Reduced anterior - posterior transport Reduced anterior - posterior transport/Uncontrolled bolus / spillover in hypopharynx Uncontrolled bolus / spillover in hypopharynx Prolonged AP transport/Uncontrolled bolus / spillover in prince-pharynx Prolonged AP transport/Uncontrolled bolus / spillover in hypopharynx

## 2024-05-31 NOTE — SWALLOW VFSS/MBS ASSESSMENT ADULT - COMMENTS
Charting in progress  Following the study, the pt was awake, alert, and in NAD. The pt was received in the URI Radiology Chair in the lateral plane. The pt was awake, alert, and in NAD. The pt was receiving supplemental 02 via NC. Prior to, during, and after the study, the pt's Sp02 was 97-98%.  The pt was noted with increased difficulty following directions requiring max multimodality cueing throughout the study. Pt was noted with period of inattention requiring max redirection to task. Following the study, the pt was awake, alert, and in NAD. The pt was left with Radiology staff awaiting transport back to the unit. "75 yo M with PMHx of TBI , Afib on Eliquis, BPH, CAD, recurrent Aspiration PNA, Peripheral Neuropathy, and Depression presents to the ED with fever and elevated blood pressures. Wife, Mony, at bedside states a productive cough began yesterday afternoon. Today, pt felt warm so she took pt's vitals: , O2 sat 93%, /106, no temperature taken. Called nursing agency who recommended going to the ED for evaluation. Wife endorses pt has been constipated since 5/25, however this is common. Pt baseline status is AOx2-3, selectively conversational depending on his mood, able to swallow, unable to use bilateral upper and lower extremities, unable to ambulate, cannot sit up himself. Pt does not engage with provider during encounter. "     The pt was received in the URI Radiology Chair in the lateral plane. The pt was awake, alert, and in NAD. The pt was receiving supplemental 02 via NC. Prior to, during, and after the study, the pt's Sp02 was 97-98%.  The pt was noted with increased difficulty following directions requiring max multimodality cueing throughout the study. Pt was noted with periods of inattention requiring max redirection to task. Following the study, the pt was awake, alert, and in NAD. The pt was left with Radiology staff awaiting transport back to the unit.

## 2024-05-31 NOTE — SWALLOW VFSS/MBS ASSESSMENT ADULT - ROSENBEK'S PENETRATION ASPIRATION SCALE
; (contrast enters airway/(1) no aspiration, contrast does not enter airway ; (2)= contrast enters airway, remains above the vocal cords, no residue remains (penetration)/(1) no aspiration, contrast does not enter airway ; (2)= contrats enters airway, remains above the vocal cords, no residue remains (penetration)/(1) no aspiration, contrast does not enter airway ;(3)= contrast remains above the vocal cords, visible residue remains (penetration); (5)= contrast contacts vocal cords, visible residue remains (penetration)/(2) contrast enters airway, remains above the vocal cords, no residue remains (penetration) ; (2) contrast enters airway, remains above the vocal cords, no residue remains (penetration)/(1) no aspiration, contrast does not enter airway

## 2024-05-31 NOTE — SWALLOW VFSS/MBS ASSESSMENT ADULT - RECOMMENDED FEEDING/EATING TECHNIQUES
maintain upright posture during/after eating for one hour/allow for swallow between intakes/alternate food with liquid/no straws/oral hygiene/position upright (90 degrees)/small sips/bites maintain upright posture during/after eating for one hour; If s/s of aspiration arise, consider d/c PO; initiating NPO, and reconsult this service/allow for swallow between intakes/alternate food with liquid/no straws/oral hygiene/position upright (90 degrees)/small sips/bites

## 2024-05-31 NOTE — CHART NOTE - NSCHARTNOTEFT_GEN_A_CORE
75 yo M with PMHx of TBI , Afib on Eliquis, BPH, CAD, recurrent Aspiration PNA, Peripheral Neuropathy, and Depression presents to the ED with fever and elevated blood pressures, admitted for suspected aspiration pna, on Zosyn.     Called by inpt lab this am, pt with Potassium 2.9.  Will replete with potassium 10 mEq IV x3 doses, and potassium 40 mEq po Q4 hrs x2 doses.         Comprehensive Metabolic Panel in AM (05.31.24 @ 07:32)    Sodium: 143 mmol/L    Potassium: 2.9 mmol/L    Chloride: 113 mmol/L    Carbon Dioxide: 26 mmol/L    Anion Gap: 4 mmol/L    Blood Urea Nitrogen: 11 mg/dL    Creatinine: 0.95 mg/dL    Glucose: 128 mg/dL    Calcium: 8.3 mg/dL    Protein Total: 5.7 g/dL    Albumin: 2.7 g/dL    Bilirubin Total: 0.3 mg/dL    Alkaline Phosphatase: 66 U/L    Aspartate Aminotransferase (AST/SGOT): 9 U/L    Alanine Aminotransferase (ALT/SGPT): 10 U/L    eGFR: 85: The estimated glomerular filtration rate (eGFR) is calculated using the  2021 CKD-EPI creatinine equation, which does not have a coefficient for  race and is validated in individuals 18 years of age and older (N Engl J  Med 2021; 385:8848-9219). Creatinine-based eGFR may be inaccurate in  various situations including but not limited to extremes of muscle mass,  altered dietary protein intake, or medications that affect renal tubular  creatinine secretion. mL/min/1.73m2 75 yo M with PMHx of TBI , Afib on Eliquis, BPH, CAD, recurrent Aspiration PNA, Peripheral Neuropathy, and Depression presents to the ED with fever and elevated blood pressures, admitted for suspected aspiration pna, on Zosyn.     Called by inpt lab this am, pt with Potassium 2.9.  Will replete with potassium 10 mEq IV x3 doses, and potassium 40 mEq po Q4 hrs x2 doses. Will check mag and phos.         Comprehensive Metabolic Panel in AM (05.31.24 @ 07:32)    Sodium: 143 mmol/L    Potassium: 2.9 mmol/L    Chloride: 113 mmol/L    Carbon Dioxide: 26 mmol/L    Anion Gap: 4 mmol/L    Blood Urea Nitrogen: 11 mg/dL    Creatinine: 0.95 mg/dL    Glucose: 128 mg/dL    Calcium: 8.3 mg/dL    Protein Total: 5.7 g/dL    Albumin: 2.7 g/dL    Bilirubin Total: 0.3 mg/dL    Alkaline Phosphatase: 66 U/L    Aspartate Aminotransferase (AST/SGOT): 9 U/L    Alanine Aminotransferase (ALT/SGPT): 10 U/L    eGFR: 85: The estimated glomerular filtration rate (eGFR) is calculated using the  2021 CKD-EPI creatinine equation, which does not have a coefficient for  race and is validated in individuals 18 years of age and older (N Engl J  Med 2021; 385:3309-1167). Creatinine-based eGFR may be inaccurate in  various situations including but not limited to extremes of muscle mass,  altered dietary protein intake, or medications that affect renal tubular  creatinine secretion. mL/min/1.73m2

## 2024-05-31 NOTE — SWALLOW VFSS/MBS ASSESSMENT ADULT - ORAL PREPARATORY PHASE
Reduced bolus formation and coordination noted. Reduced mastication, bolus formation, and coordination.

## 2024-05-31 NOTE — ADVANCED PRACTICE NURSE CONSULT - RECOMMEDATIONS
Multiple raised lesions to B/L upper and lower extremities  ·	Cover dry lesions with Adaptic touch and gauze. Secure with Marty.  ·	Cleanse open lesions to RLE and R elbow with NS. Cover with Xeroform and gauze. Secure with Marty.    Prevention  ·	Moisturize skin with Sween24 daily after bathing.  ·	Continue turning and positioning q2h and utilize offloading devices as per protocol.  ·	Avoid use of diapers and utilize external catheters if patient unable to toilet OOB.  ·	Continue with only one breathable underpad and daily/PRN pericare for soiling.  ·	Waffle cushion if OOB to chair.  ·	Nutrition Consult for optimization in pt w/ increased nutritional needs.  ·	Encourage high quality protein, Thompson/Prosource, MVI & Vit C to promote wound healing.     Multiple raised lesions to B/L upper and lower extremities  ·	Cover dry lesions with Adaptic touch and gauze. Secure with Marty.  ·	Cleanse open lesions to RLE and R elbow with NS. Cover with Xeroform and gauze. Secure with Marty.  ·	Patient to f/u with outpatient dermatologist.    Prevention  ·	Moisturize skin with Sween24 daily after bathing.  ·	Continue turning and positioning q2h and utilize offloading devices as per protocol.  ·	Avoid use of diapers and utilize external catheters if patient unable to toilet OOB.  ·	Continue with only one breathable underpad and daily/PRN pericare for soiling.  ·	Waffle cushion if OOB to chair.  ·	Nutrition Consult for optimization in pt w/ increased nutritional needs.  ·	Encourage high quality protein, Thompson/Prosource, MVI & Vit C to promote wound healing.     Multiple raised lesions to B/L upper and lower extremities  ·	Cover dry lesions with Adaptic touch and gauze. Secure with Marty.  ·	Cleanse open lesions to RLE and R elbow with NS. Cover with Xeroform and gauze. Secure with Marty. Daily/PRN soiling.  ·	Patient to f/u with outpatient dermatologist.    Prevention  ·	Moisturize skin with Sween24 daily after bathing.  ·	Continue turning and positioning q2h and utilize offloading devices as per protocol.  ·	Avoid use of diapers and utilize external catheters if patient unable to toilet OOB.  ·	Continue with only one breathable underpad and daily/PRN pericare for soiling.  ·	Waffle cushion if OOB to chair.  ·	Nutrition Consult for optimization in pt w/ increased nutritional needs.  ·	Encourage high quality protein, Thompson/Prosource, MVI & Vit C to promote wound healing.     Multiple raised lesions to B/L upper and lower extremities  ·	Cover dry lesions with Adaptic touch and gauze. Secure with Marty weekly/PRN soiling.  ·	Cleanse open lesions to RLE and R elbow with NS. Apply mupirocin. Cover with gauze and Marty or gauze and paper tape. BID/PRN soiling.  ·	Patient to f/u with outpatient dermatologist. Next appointment 6/10/24 as per the patient's wife.    Prevention  ·	Moisturize skin with Sween24 daily after bathing.  ·	Continue turning and positioning q2h and utilize offloading devices as per protocol.  ·	Avoid use of diapers and utilize external catheters if patient unable to toilet OOB.  ·	Continue with only one breathable underpad and daily/PRN pericare for soiling.  ·	Waffle cushion if OOB to chair.  ·	Nutrition Consult for optimization in pt w/ increased nutritional needs.  ·	Encourage high quality protein, Thompson/Prosource, MVI & Vit C to promote wound healing.

## 2024-05-31 NOTE — ADVANCED PRACTICE NURSE CONSULT - ASSESSMENT
As per the H&P: 75 yo M with PMHx of TBI , Afib on Eliquis, BPH, CAD, recurrent Aspiration PNA, Peripheral Neuropathy, and Depression presents to the ED with fever and elevated blood pressures. Wife, Mony, at bedside states a productive cough began yesterday afternoon. Today, pt felt warm so she took pt's vitals: , O2 sat 93%, /106, no temperature taken. Called nursing agency who recommended going to the ED for evaluation. Wife endorses pt has been constipated since 5/25, however this is common. Pt baseline status is AOx2-3, selectively conversational depending on his mood, able to swallow, unable to use bilateral upper and lower extremities, unable to ambulate, cannot sit up himself. Pt does not engage with provider during encounter.     Met with the patient on [facility]. Patient awake and responds to simple questioning. However, conflicting answers when providing history. For example, patient states he "drives to his dermatologist." He is noted to have contracted upper extremities and is resistant to movement. Unable to turn and position with assistance. Upper and lower bilateral extremities noted to have diffuse raised brown/black lesions. Nontender, dry with no drainage noted. RLE and R elbow with unroofed lesions exposing a moist dermis. Etiology unknown and unable to obtain history from patient. Left a message for patient's wife.    Cleansed dry, intact lesions with NS and pat dry. Covered with Adaptic Touch and gauze. Secured with Marty. RLE and R elbow partial thickness wounds cleansed with NS and pat dry. Covered with Xeroform and gauze. Secured with Marty.   As per the H&P: 75 yo M with PMHx of TBI , Afib on Eliquis, BPH, CAD, recurrent Aspiration PNA, Peripheral Neuropathy, and Depression presents to the ED with fever and elevated blood pressures. Wife, Mony, at bedside states a productive cough began yesterday afternoon. Today, pt felt warm so she took pt's vitals: , O2 sat 93%, /106, no temperature taken. Called nursing agency who recommended going to the ED for evaluation. Wife endorses pt has been constipated since 5/25, however this is common. Pt baseline status is AOx2-3, selectively conversational depending on his mood, able to swallow, unable to use bilateral upper and lower extremities, unable to ambulate, cannot sit up himself. Pt does not engage with provider during encounter.     Met with the patient on [facility]. Patient awake and responds to simple questioning. However, conflicting answers when providing history. For example, patient states he "drives to his dermatologist." He is noted to have contracted upper extremities and is resistant to movement. Unable to turn and position with assistance. Upper and lower bilateral extremities noted to have diffuse raised brown/black lesions. Nontender, dry with no drainage noted. RLE and R elbow with unroofed lesions exposing a moist dermis. Etiology unknown and unable to obtain history from patient. Left a message for patient's wife.    Cleansed dry, intact lesions with NS and pat dry. Covered with Adaptic Touch and gauze. Secured with Marty. RLE and R elbow partial thickness wounds cleansed with NS and pat dry. Covered with Xeroform and gauze. Secured with Marty.    ADD: Spoke with patient's wife, Mony at 15:20. Patient follows up with a dermatologist and was just seen on Tuesday to remove the lesion on his R elbow. Biopsy results are pending. The lesion on his RLE was removed a few weeks ago and came back positive for BCC. He has a history of BCC with MOHs procedures x2 and regularly follows up with the dermatologist to freeze off lesions. Patient's wife reports she has been applying mupirocin to the lesions as per the dermatologist and was told she can choose to either cover or uncover the dry lesions. As per the wife, when they are uncovered they sometimes fall off when the patient is getting dressed or changing positions. Dressings found on the patients today were placed by the patient's HHA.

## 2024-05-31 NOTE — CONSULT NOTE ADULT - ASSESSMENT
Pt is a 74M w/ PMHx of TBI, Afib on Eliquis, BPH, CAD, recurrent Aspiration PNA, Peripheral Neuropathy, and Depression presents to the ED with fever and elevated blood pressures, admitted for suspected aspiration pna.     Sepsis 2/2 Aspiration PNA  AHRF on NC  - pt noted to have hx of aspiration PNA  - presented w/ Temp 102, , WBC 14.96, suspected source viral vs aspiration pna   - RVP +entero/rhinovirus  - UA negative  - BCx NGTD  - CXR No radiographic evidence of active chest disease. Lateral radiograph recommended to evaluate posterior lung bases.=  - Modified barium swallow done showed laryngeal penetration.     Recommendations:   No evidence of PNA on CXR  Can likely d/c zosyn, suspect viral PNA as cause of presenting sepsis  Trend temps/WBC  Supportive care, O2 as needed--wean O2 as tolerated  Maintain aspiration precautions, S&S eval appreciated  Additional care per primary team   **FULL NOTE TO FOLLOW**    Dr. Perea covering weekend service  Infectious Diseases will follow. Please call with any questions.  Hafsa Bhardwaj M.D.  [facility] Division of Infectious Diseases [phone]  For after 5 P.M. and weekends, please call [phone]   Pt is a 74M w/ PMHx of TBI, Afib on Eliquis, BPH, CAD, recurrent Aspiration PNA, Peripheral Neuropathy, and Depression presents to the ED with fever and elevated blood pressures, admitted for suspected aspiration pna.     Sepsis 2/2 Aspiration PNA  AHRF on NC  - pt noted to have hx of aspiration PNA  - presented w/ Temp 102, , WBC 14.96, suspected source viral vs aspiration pna   - RVP +entero/rhinovirus  - UA negative  - BCx NGTD  - CXR No radiographic evidence of active chest disease. Lateral radiograph recommended to evaluate posterior lung bases.  - Modified barium swallow done showed laryngeal penetration.     Recommendations:   No evidence of PNA on CXR. CT chest for further evaluation. (ordered)  C/w zosyn for now, but if no infiltrate can consider d/c  Trend temps/WBC  Supportive care, O2 as needed--wean O2 as tolerated  Maintain aspiration precautions, S&S eval appreciated  Additional care per primary team     D/w Dr. Álvaro Perea covering weekend service  Infectious Diseases will follow. Please call with any questions.  Hafsa Bhardwaj M.D.  [facility] Division of Infectious Diseases [phone]  For after 5 P.M. and weekends, please call [phone]

## 2024-06-01 ENCOUNTER — TRANSCRIPTION ENCOUNTER (OUTPATIENT)
Age: 74
End: 2024-06-01

## 2024-06-01 LAB
ANION GAP SERPL CALC-SCNC: 6 MMOL/L — SIGNIFICANT CHANGE UP (ref 5–17)
BUN SERPL-MCNC: 14 MG/DL — SIGNIFICANT CHANGE UP (ref 7–23)
CALCIUM SERPL-MCNC: 8 MG/DL — LOW (ref 8.5–10.1)
CHLORIDE SERPL-SCNC: 113 MMOL/L — HIGH (ref 96–108)
CO2 SERPL-SCNC: 24 MMOL/L — SIGNIFICANT CHANGE UP (ref 22–31)
CREAT SERPL-MCNC: 1 MG/DL — SIGNIFICANT CHANGE UP (ref 0.5–1.3)
EGFR: 80 ML/MIN/1.73M2 — SIGNIFICANT CHANGE UP
FERRITIN SERPL-MCNC: 136 NG/ML — SIGNIFICANT CHANGE UP (ref 30–400)
FOLATE SERPL-MCNC: 12.1 NG/ML — SIGNIFICANT CHANGE UP
GLUCOSE SERPL-MCNC: 131 MG/DL — HIGH (ref 70–99)
HCT VFR BLD CALC: 30 % — LOW (ref 39–50)
HGB BLD-MCNC: 9.5 G/DL — LOW (ref 13–17)
IRON SATN MFR SERPL: 15 UG/DL — LOW (ref 45–165)
MCHC RBC-ENTMCNC: 24.7 PG — LOW (ref 27–34)
MCHC RBC-ENTMCNC: 31.7 GM/DL — LOW (ref 32–36)
MCV RBC AUTO: 77.9 FL — LOW (ref 80–100)
NRBC # BLD: 0 /100 WBCS — SIGNIFICANT CHANGE UP (ref 0–0)
OB PNL STL: NEGATIVE — SIGNIFICANT CHANGE UP
PLATELET # BLD AUTO: 196 K/UL — SIGNIFICANT CHANGE UP (ref 150–400)
POTASSIUM SERPL-MCNC: 3.5 MMOL/L — SIGNIFICANT CHANGE UP (ref 3.5–5.3)
POTASSIUM SERPL-SCNC: 3.5 MMOL/L — SIGNIFICANT CHANGE UP (ref 3.5–5.3)
RBC # BLD: 3.85 M/UL — LOW (ref 4.2–5.8)
RBC # FLD: 17 % — HIGH (ref 10.3–14.5)
SODIUM SERPL-SCNC: 143 MMOL/L — SIGNIFICANT CHANGE UP (ref 135–145)
VIT B12 SERPL-MCNC: 299 PG/ML — SIGNIFICANT CHANGE UP (ref 232–1245)
WBC # BLD: 6.07 K/UL — SIGNIFICANT CHANGE UP (ref 3.8–10.5)
WBC # FLD AUTO: 6.07 K/UL — SIGNIFICANT CHANGE UP (ref 3.8–10.5)

## 2024-06-01 PROCEDURE — 99232 SBSQ HOSP IP/OBS MODERATE 35: CPT

## 2024-06-01 RX ORDER — HYDRALAZINE HCL 50 MG
1 TABLET ORAL
Qty: 0 | Refills: 0 | DISCHARGE
Start: 2024-06-01

## 2024-06-01 RX ORDER — MODAFINIL 200 MG/1
1 TABLET ORAL
Qty: 0 | Refills: 0 | DISCHARGE
Start: 2024-06-01

## 2024-06-01 RX ORDER — GABAPENTIN 400 MG/1
1 CAPSULE ORAL
Qty: 0 | Refills: 0 | DISCHARGE
Start: 2024-06-01

## 2024-06-01 RX ORDER — DILTIAZEM HCL 120 MG
1 CAPSULE, EXT RELEASE 24 HR ORAL
Refills: 0 | DISCHARGE

## 2024-06-01 RX ORDER — MUPIROCIN 20 MG/G
1 OINTMENT TOPICAL
Qty: 1 | Refills: 0
Start: 2024-06-01 | End: 2024-06-30

## 2024-06-01 RX ORDER — GABAPENTIN 400 MG/1
1 CAPSULE ORAL
Refills: 0 | DISCHARGE

## 2024-06-01 RX ORDER — MIRTAZAPINE 45 MG/1
1 TABLET, ORALLY DISINTEGRATING ORAL
Qty: 0 | Refills: 0 | DISCHARGE
Start: 2024-06-01

## 2024-06-01 RX ORDER — MUPIROCIN 20 MG/G
1 OINTMENT TOPICAL
Qty: 1 | Refills: 0
Start: 2024-06-01 | End: 2024-06-14

## 2024-06-01 RX ORDER — DILTIAZEM HCL 120 MG
1 CAPSULE, EXT RELEASE 24 HR ORAL
Qty: 0 | Refills: 0 | DISCHARGE
Start: 2024-06-01

## 2024-06-01 RX ADMIN — SODIUM CHLORIDE 100 MILLILITER(S): 9 INJECTION INTRAMUSCULAR; INTRAVENOUS; SUBCUTANEOUS at 17:38

## 2024-06-01 RX ADMIN — MUPIROCIN 1 APPLICATION(S): 20 OINTMENT TOPICAL at 05:34

## 2024-06-01 RX ADMIN — Medication 240 MILLIGRAM(S): at 05:35

## 2024-06-01 RX ADMIN — ENOXAPARIN SODIUM 60 MILLIGRAM(S): 100 INJECTION SUBCUTANEOUS at 17:40

## 2024-06-01 RX ADMIN — PIPERACILLIN AND TAZOBACTAM 25 GRAM(S): 4; .5 INJECTION, POWDER, LYOPHILIZED, FOR SOLUTION INTRAVENOUS at 13:11

## 2024-06-01 RX ADMIN — PANTOPRAZOLE SODIUM 40 MILLIGRAM(S): 20 TABLET, DELAYED RELEASE ORAL at 13:11

## 2024-06-01 RX ADMIN — Medication 200 MILLIGRAM(S): at 13:57

## 2024-06-01 RX ADMIN — MIRTAZAPINE 7.5 MILLIGRAM(S): 45 TABLET, ORALLY DISINTEGRATING ORAL at 13:11

## 2024-06-01 RX ADMIN — GABAPENTIN 400 MILLIGRAM(S): 400 CAPSULE ORAL at 13:10

## 2024-06-01 RX ADMIN — Medication 25 MILLIGRAM(S): at 23:19

## 2024-06-01 RX ADMIN — GABAPENTIN 400 MILLIGRAM(S): 400 CAPSULE ORAL at 05:35

## 2024-06-01 RX ADMIN — Medication 200 MILLIGRAM(S): at 23:19

## 2024-06-01 RX ADMIN — PIPERACILLIN AND TAZOBACTAM 25 GRAM(S): 4; .5 INJECTION, POWDER, LYOPHILIZED, FOR SOLUTION INTRAVENOUS at 05:34

## 2024-06-01 RX ADMIN — Medication 25 MILLIGRAM(S): at 13:10

## 2024-06-01 RX ADMIN — LATANOPROST 1 DROP(S): 0.05 SOLUTION/ DROPS OPHTHALMIC; TOPICAL at 00:31

## 2024-06-01 RX ADMIN — GABAPENTIN 400 MILLIGRAM(S): 400 CAPSULE ORAL at 23:19

## 2024-06-01 RX ADMIN — LATANOPROST 1 DROP(S): 0.05 SOLUTION/ DROPS OPHTHALMIC; TOPICAL at 23:21

## 2024-06-01 RX ADMIN — MUPIROCIN 1 APPLICATION(S): 20 OINTMENT TOPICAL at 23:21

## 2024-06-01 RX ADMIN — Medication 25 MILLIGRAM(S): at 05:34

## 2024-06-01 RX ADMIN — ENOXAPARIN SODIUM 60 MILLIGRAM(S): 100 INJECTION SUBCUTANEOUS at 05:37

## 2024-06-01 NOTE — PROGRESS NOTE ADULT - PROBLEM SELECTOR PLAN 1
- Septic on admission: Temp 102, , WBC 14.96, suspected source viral vs aspiration pna   - UA negative  - RVP +enterorhino  - Hx of recurrent aspiration PNA   - CXR: grossly clear right lung field with minimal costophrenic blunting on left as per personal read, official read pending   - continue zosyn  - concern for aspriation  - Modified barium swallow done showed laryngeal penetration. Swallow eval appreciated - suggested Soft and bite sized with moderately thick liquids as tolerated
- Septic on admission: Temp 102, , WBC 14.96, suspected source viral vs aspiration pna   - Lactate 1.6  - UA negative  - RVP +enterorhino  - Hx of recurrent aspiration PNA   - CXR: grossly clear right lung field with minimal costophrenic blunting on left as per personal read, official read pending   contnue on zosyn  concern for aspriatin  Modified barium swallow done showed laryngeal penetration. suggested Soft and bite sized with moderately thick liquids as tolerated     speech and swallow
- Septic on admission: Temp 102, , WBC 14.96, suspected source viral vs aspiration pna   - Lactate 1.6  - UA negative  - RVP +enterorhino  - Hx of recurrent aspiration PNA   - CXR: grossly clear right lung field with minimal costophrenic blunting on left as per personal read, official read pending   contnue on zosyn  concern for aspriatin  Modified barium  speech and swallow

## 2024-06-01 NOTE — PROGRESS NOTE ADULT - PROBLEM SELECTOR PLAN 3
- H/H 11.6/35.4 --> 9.9-->9.0/28 --> 9.5/30.0 today. Low MCV . hemoglobin was ~ 9 previously too but mostly >10 in past result   - Iron deficiency anemia on prior studies from 11/20/23  - Several open superficial skin wounds, dressed with hemostasis achieved   - No signs of active more concerning bleeding.   - Continue to monitor
- H/H 11.6/35.4 --> 9.9-->9.0/28 today . Low MCV . hemoglobin was ~ 9 previously too but mostly >10 in past result   - Iron deficiency anemia on prior studies from 11/20/23  - Several open superficial skin wounds, dressed with hemostasis achieved   - No signs of active more concerning bleeding. will repeat anemia workup and FOBT   - Continue to monitor
- H/H 11.6/35.4   - Iron deficiency anemia on prior studies from 11/20/23  - Several open superficial skin wounds, dressed with hemostasis achieved   - No signs of active more concerning bleeding  - Continue to monitor

## 2024-06-01 NOTE — PROGRESS NOTE ADULT - PROBLEM SELECTOR PLAN 6
- Continue home Latanoprost drops

## 2024-06-01 NOTE — PROGRESS NOTE ADULT - PROBLEM SELECTOR PLAN 5
Patient Name: Mary Khanna   Procedure Date: 3/4/2017 8:16 AM   MRN: [id number]   Account Number: [id number]   YOB: 1965   Admit Type: Outpatient   Age: 51   Gender: Female   Note Status: Supervisor Override   Attending MD: Marquez Cheung ,   Procedure:            Colonoscopy   Indications:          Screening in patient at increased risk: Family history                         of 1st-degree relative with colorectal cancer before                         age 60 years   Providers:            Marquez Cheung   Referring MD:         Devon Vizcarra MD   Sedation:             Fentanyl 75 micrograms IV, Midazolam 3 mg IV   Procedure:        Pre-Anesthesia Assessment:        - ASA Grade Assessment: II - A patient with mild systemic disease.        A History and Physical was performed prior to the procedure. Patient        medications and allergies were reviewed. The risks and benefits of the        procedure and sedation options were discussed. Questions were answered        and informed consent was obtained. Patient identification and proposed        procedure were verified. The patient was deemed in satisfactory        condition to undergo the procedure. The heart rate, respiratory rate,        oxygen saturations, blood pressure and response to sedation were        monitored throughout the procedure.        The endoscope was passed under direct vision. The Endoscope was        introduced through the anus and advanced to the cecum, identified by        appendiceal orifice and ileocecal valve. The physical status of the        patient was re-assessed after the procedure. The colonoscopy was        performed without difficulty. The patient tolerated the procedure well.        The quality of the bowel preparation was good.   Findings:        A small polyp was found in the ascending colon. The polyp was removed        with a cold snare. Resection and retrieval were complete.        Multiple diverticula were found in 
the sigmoid colon.   Impression:        - One small polyp in the ascending colon. Resected and retrieved.        - Diverticulosis in the sigmoid colon.   Recommendation:        - Discharge patient to home.        - High fiber diet.        - Await pathology results.        - Repeat colonoscopy in 5 years for surveillance.   Complications:        No immediate complications.   Marquez Cheung,   3/4/2017 8:46:47 AM   This report has been signed electronically by the above.   Number of Addenda: 0   Procedure Code(s):    --- Professional ---                         69378, Colonoscopy, flexible; with removal of tumor(s),                         polyp(s), or other lesion(s) by snare technique   Diagnosis Code(s):    --- Professional ---                         Z80.0, Family history of malignant neoplasm of                         digestive organs                         D12.2, Benign neoplasm of ascending colon                         K57.30, Diverticulosis of large intestine without                         perforation or abscess without bleeding   CPT copyright 2015 [employer]. All rights reserved.   The codes documented in this report are preliminary and upon  review may   be revised to meet current compliance requirements.        [facility] [address] [address] [phone]     
- Hx of diabetes with previous use of Metformin  - Discontinued on medication with normal finger sticks at home  - [id number] on admission   - AM A1c ordered
- Hx of diabetes with previous use of Metformin  - Discontinued on medication with normal finger sticks at home  - [id number] on admission   - AM A1c ordered
- Hx of diabetes with previous use of Metformin  - Discontinued on medication with normal finger sticks at home  - [id number] on admission   - A1c 5.6

## 2024-06-01 NOTE — PROGRESS NOTE ADULT - PROBLEM SELECTOR PLAN 2
rate contrlled  resume home meds
rate contrlled  resume home diltiazem. on full dose Lovenox while inpatient
Chronic   - rate controlled  - resume home diltiazem. on full dose Lovenox while inpatient. Will switch to home Eliquis today

## 2024-06-01 NOTE — PROGRESS NOTE ADULT - SUBJECTIVE AND OBJECTIVE BOX
Patient is a 74y old  Male who presents with a chief complaint of Sepsis (31 May 2024 15:13)      INTERVAL HPI/OVERNIGHT EVENTS: Patient seen and examined at bedside. No overnight events. Denies pain. No complaints at this time. Updated wife via phone call at bedside with patient.     MEDICATIONS  (STANDING):  bisacodyl Suppository 10 milliGRAM(s) Rectal daily  diltiazem    milliGRAM(s) Oral daily  enoxaparin Injectable 60 milliGRAM(s) SubCutaneous every 12 hours  gabapentin 400 milliGRAM(s) Oral three times a day  hydrALAZINE 25 milliGRAM(s) Oral three times a day  latanoprost 0.005% Ophthalmic Solution 1 Drop(s) Both EYES at bedtime  mirtazapine 7.5 milliGRAM(s) Oral daily  mupirocin 2% Ointment 1 Application(s) Topical two times a day  pantoprazole   Suspension 40 milliGRAM(s) Oral daily  piperacillin/tazobactam IVPB.. 3.375 Gram(s) IV Intermittent every 8 hours  sodium chloride 0.9%. 1000 milliLiter(s) (100 mL/Hr) IV Continuous <Continuous>    MEDICATIONS  (PRN):  guaiFENesin Oral Liquid (Sugar-Free) 200 milliGRAM(s) Oral every 6 hours PRN Cough  ondansetron Injectable 4 milliGRAM(s) IV Push every 8 hours PRN Nausea and/or Vomiting      Allergies    No Known Allergies    Intolerances        REVIEW OF SYSTEMS:  Unable to obtain meaningful ROS 2/2 baseline mental status    Vital Signs Last 24 Hrs  T(C): 36.9 (01 Jun 2024 04:40), Max: 37.1 (31 May 2024 12:28)  T(F): 98.5 (01 Jun 2024 04:40), Max: 98.8 (31 May 2024 12:28)  HR: 78 (01 Jun 2024 04:40) (74 - 81)  BP: 160/97 (01 Jun 2024 04:40) (130/71 - 160/97)  BP(mean): --  RR: 17 (01 Jun 2024 04:40) (17 - 18)  SpO2: 95% (01 Jun 2024 04:40) (93% - 98%)    Parameters below as of 01 Jun 2024 04:40  Patient On (Oxygen Delivery Method): nasal cannula        PHYSICAL EXAM:  GENERAL: NAD, lying in bed   HEENT:  anicteric, moist mucous membranes  CHEST/LUNG:  CTA b/l, no rales, wheezes, or rhonchi  HEART:  RRR, S1, S2  ABDOMEN:  BS+, soft, nontender, nondistended  SKIN: diffused keratotic lesions across bilateral lower extremities  EXTREMITIES: no edema, cyanosis, or calf tenderness  NERVOUS SYSTEM: awake, alert    LABS:                        9.5    6.07  )-----------( 196      ( 01 Jun 2024 06:20 )             30.0     CBC Full  -  ( 01 Jun 2024 06:20 )  WBC Count : 6.07 K/uL  Hemoglobin : 9.5 g/dL  Hematocrit : 30.0 %  Platelet Count - Automated : 196 K/uL  Mean Cell Volume : 77.9 fl  Mean Cell Hemoglobin : 24.7 pg  Mean Cell Hemoglobin Concentration : 31.7 gm/dL  Auto Neutrophil # : x  Auto Lymphocyte # : x  Auto Monocyte # : x  Auto Eosinophil # : x  Auto Basophil # : x  Auto Neutrophil % : x  Auto Lymphocyte % : x  Auto Monocyte % : x  Auto Eosinophil % : x  Auto Basophil % : x    31 May 2024 20:42    143    |  113    |  14     ----------------------------<  127    4.0     |  25     |  1.00     Ca    8.0        31 May 2024 20:42        Urinalysis Basic - ( 31 May 2024 20:42 )    Color: x / Appearance: x / SG: x / pH: x  Gluc: 127 mg/dL / Ketone: x  / Bili: x / Urobili: x   Blood: x / Protein: x / Nitrite: x   Leuk Esterase: x / RBC: x / WBC x   Sq Epi: x / Non Sq Epi: x / Bacteria: x      CAPILLARY BLOOD GLUCOSE            Culture - Blood (collected 05-29-24 @ 20:45)  Source: .Blood Blood-Peripheral  Preliminary Report (06-01-24 @ 01:03):    No growth at 48 Hours    Culture - Blood (collected 05-29-24 @ 20:30)  Source: .Blood Blood-Peripheral  Preliminary Report (06-01-24 @ 01:03):    No growth at 48 Hours        RADIOLOGY & ADDITIONAL TESTS:    Personally reviewed.     Consultant(s) Notes Reviewed:  [x] YES  [ ] NO

## 2024-06-01 NOTE — DISCHARGE NOTE PROVIDER - HOSPITAL COURSE
ADMISSION H+P:    HPI:  73 yo M with PMHx of TBI , Afib on Eliquis, BPH, CAD, recurrent Aspiration PNA, Peripheral Neuropathy, and Depression presents to the ED with fever and elevated blood pressures. Wife, Mony, at bedside states a productive cough began yesterday afternoon. Today, pt felt warm so she took pt's vitals: , O2 sat 93%, /106, no temperature taken. Called nursing agency who recommended going to the ED for evaluation. Wife endorses pt has been constipated since 5/25, however this is common. Pt baseline status is AOx2-3, selectively conversational depending on his mood, able to swallow, unable to use bilateral upper and lower extremities, unable to ambulate, cannot sit up himself. Pt does not engage with provider during encounter.     ED Course:   Vitals: BP: 162/104, HR: 105, Temp: 102, RR: 16, SpO2: 94% on RA    Labs: WBC 14.96, Lactate 1.6, H/H 11.6/35.4   CXR: grossly clear right lung field with minimal costophrenic blunting on left as per personal read, official read pending   Received in the ED: Tylenol Suppository x1, Zosyn 3.375 mg IVPB, NS 1.9L bolus    (29 May 2024 22:07)      ---  HOSPITAL COURSE: Patient admitted for management of suspected aspiration pna. ID (Dr. Perea) followed patient. RVP +enterorhino. Modified barium swallow done showed laryngeal penetration. Swallow eval appreciated - suggested soft and bite sized with moderately thick liquids as tolerated. Patient treated with IV Zosyn and switched to Augmentin on discharge. Home Modafinil was held during hospital course. Patient advised to follow up with neurologist after discharge. Home Eliquis was resumed on discharge.     Patient was medically optimized and improved clinically throughout hospital course. Patient seen and examined on day of discharge.    Vital Signs  T(C): 37.2 (01 Jun 2024 12:09), Max: 37.2 (01 Jun 2024 12:09)  T(F): 98.9 (01 Jun 2024 12:09), Max: 98.9 (01 Jun 2024 12:09)  HR: 68 (01 Jun 2024 12:09) (68 - 81)  BP: 147/78 (01 Jun 2024 12:09) (147/78 - 160/97)  RR: 17 (01 Jun 2024 12:09) (17 - 18)  SpO2: 94% (01 Jun 2024 12:09) (93% - 95%)    Physical Exam:  General: NAD  HEENT: normocephalic, atraumatic, EOMI, moist mucous membranes   Neck: supple, non-tender, no masses  Neurology: awake, alert  Respiratory: clear to auscultation bilaterally; no wheezes, rhonchi, or rales  CV: regular rate and rhythm, soft S1/S2, no murmurs, rubs, or gallops  Abdominal: soft, non-tender, non-distended, bowel sounds present  Extremities: no clubbing, cyanosis, or edema; palpable peripheral pulses  Musculoskeletal: no joint erythema or warmth, no joint swelling   Skin: warm, dry, normal color    Patient is medically stable for discharge to home with outpatient follow up.  ---  CONSULTANTS:   ID: Dr. Perea    ---  TIME SPENT:   I, the attending physician, was physically present for the key portions of the evaluation and management (E/M) service provided. The total amount of time spent reviewing the hospital course, laboratory values, imaging findings, assessing/counseling the patient, discussing with consultant physicians, social work, nursing staff was 35 minutes.     ---  FINAL DISCHARGE DIAGNOSIS LIST:  Please see last daily progress note for final discharge diagnoses         ADMISSION H+P:    HPI:  73 yo M with PMHx of TBI , Afib on Eliquis, BPH, CAD, recurrent Aspiration PNA, Peripheral Neuropathy, and Depression presents to the ED with fever and elevated blood pressures. Wife, Mony, at bedside states a productive cough began yesterday afternoon. Today, pt felt warm so she took pt's vitals: , O2 sat 93%, /106, no temperature taken. Called nursing agency who recommended going to the ED for evaluation. Wife endorses pt has been constipated since 5/25, however this is common. Pt baseline status is AOx2-3, selectively conversational depending on his mood, able to swallow, unable to use bilateral upper and lower extremities, unable to ambulate, cannot sit up himself. Pt does not engage with provider during encounter.     ED Course:   Vitals: BP: 162/104, HR: 105, Temp: 102, RR: 16, SpO2: 94% on RA    Labs: WBC 14.96, Lactate 1.6, H/H 11.6/35.4   CXR: grossly clear right lung field with minimal costophrenic blunting on left as per personal read, official read pending   Received in the ED: Tylenol Suppository x1, Zosyn 3.375 mg IVPB, NS 1.9L bolus    (29 May 2024 22:07)      ---  HOSPITAL COURSE: Patient admitted for management of suspected aspiration pna. ID (Dr. Perea) followed patient. RVP +enterorhino. Modified barium swallow done showed laryngeal penetration. Swallow eval appreciated - suggested soft and bite sized with moderately thick liquids as tolerated. Patient treated with IV Zosyn and switched to Augmentin on discharge. Patient treated with FD Lovenox while inpatient and home Eliquis was resumed on discharge.     Patient was medically optimized and improved clinically throughout hospital course. Patient seen and examined on day of discharge.    Vital Signs  T(C): 37.2 (01 Jun 2024 12:09), Max: 37.2 (01 Jun 2024 12:09)  T(F): 98.9 (01 Jun 2024 12:09), Max: 98.9 (01 Jun 2024 12:09)  HR: 68 (01 Jun 2024 12:09) (68 - 81)  BP: 147/78 (01 Jun 2024 12:09) (147/78 - 160/97)  RR: 17 (01 Jun 2024 12:09) (17 - 18)  SpO2: 94% (01 Jun 2024 12:09) (93% - 95%)    Physical Exam:  General: NAD  HEENT: normocephalic, atraumatic, EOMI, moist mucous membranes   Neck: supple, non-tender, no masses  Neurology: awake, alert  Respiratory: clear to auscultation bilaterally; no wheezes, rhonchi, or rales  CV: regular rate and rhythm, soft S1/S2, no murmurs, rubs, or gallops  Abdominal: soft, non-tender, non-distended, bowel sounds present  Extremities: no clubbing, cyanosis, or edema; palpable peripheral pulses  Musculoskeletal: no joint erythema or warmth, no joint swelling   Skin: warm, dry, normal color    Patient is medically stable for discharge to home with outpatient follow up.  ---  CONSULTANTS:   ID: Dr. Perea    ---  TIME SPENT:   I, the attending physician, was physically present for the key portions of the evaluation and management (E/M) service provided. The total amount of time spent reviewing the hospital course, laboratory values, imaging findings, assessing/counseling the patient, discussing with consultant physicians, social work, nursing staff was 35 minutes.     ---  FINAL DISCHARGE DIAGNOSIS LIST:  Please see last daily progress note for final discharge diagnoses         ADMISSION H+P:    HPI:  73 yo M with PMHx of TBI , Afib on Eliquis, BPH, CAD, recurrent Aspiration PNA, Peripheral Neuropathy, and Depression presents to the ED with fever and elevated blood pressures. Wife, Mony, at bedside states a productive cough began yesterday afternoon. Today, pt felt warm so she took pt's vitals: , O2 sat 93%, /106, no temperature taken. Called nursing agency who recommended going to the ED for evaluation. Wife endorses pt has been constipated since 5/25, however this is common. Pt baseline status is AOx2-3, selectively conversational depending on his mood, able to swallow, unable to use bilateral upper and lower extremities, unable to ambulate, cannot sit up himself. Pt does not engage with provider during encounter.     ED Course:   Vitals: BP: 162/104, HR: 105, Temp: 102, RR: 16, SpO2: 94% on RA    Labs: WBC 14.96, Lactate 1.6, H/H 11.6/35.4   CXR: grossly clear right lung field with minimal costophrenic blunting on left as per personal read, official read pending   Received in the ED: Tylenol Suppository x1, Zosyn 3.375 mg IVPB, NS 1.9L bolus    (29 May 2024 22:07)      ---  HOSPITAL COURSE: Patient admitted for management of suspected aspiration pna. ID (Dr. Perea) followed patient. RVP +enterorhino. Modified barium swallow done showed laryngeal penetration. Swallow eval appreciated - suggested soft and bite sized with moderately thick liquids as tolerated. Patient treated with IV Zosyn and switched to Augmentin on discharge. Patient treated with FD Lovenox while inpatient and home Eliquis was resumed on discharge.     Patient was medically optimized and improved clinically throughout hospital course. Patient seen and examined on day of discharge.    Vital Signs  Vital Signs (24 Hrs):  T(C): 36.5 (06-02-24 @ 04:45), Max: 37.2 (06-01-24 @ 12:09)  HR: 79 (06-02-24 @ 04:45) (68 - 86)  BP: 137/81 (06-02-24 @ 04:45) (137/81 - 176/87)  RR: 19 (06-02-24 @ 04:45) (17 - 19)  SpO2: 95% (06-02-24 @ 04:45) (94% - 95%)    Physical Exam:  General: NAD  HEENT: normocephalic, atraumatic, EOMI, moist mucous membranes   Neck: supple, non-tender, no masses  Neurology: awake, alert  Respiratory: clear to auscultation bilaterally; no wheezes, rhonchi, or rales  CV: regular rate and rhythm, soft S1/S2, no murmurs, rubs, or gallops  Abdominal: soft, non-tender, non-distended, bowel sounds present  Extremities: no clubbing, cyanosis, or edema; palpable peripheral pulses  Musculoskeletal: no joint erythema or warmth, no joint swelling   Skin: warm, dry, normal color    Patient is medically stable for discharge to home with outpatient follow up.  ---  CONSULTANTS:   ID: Dr. Perea    ---  TIME SPENT:   I, the attending physician, was physically present for the key portions of the evaluation and management (E/M) service provided. The total amount of time spent reviewing the hospital course, laboratory values, imaging findings, assessing/counseling the patient, discussing with consultant physicians, social work, nursing staff was 35 minutes.     ---  FINAL DISCHARGE DIAGNOSIS LIST:  Please see last daily progress note for final discharge diagnoses

## 2024-06-01 NOTE — PROGRESS NOTE ADULT - ASSESSMENT
75 yo M with PMHx of TBI , Afib on Eliquis, BPH, CAD, recurrent Aspiration PNA, Peripheral Neuropathy, and Depression presents to the ED with fever and elevated blood pressures, admitted for suspected aspiration pna.       presenting with fever, sepsis , aspiration PNA , enterorhino   -ekg as above af 95 bpm RVH inferior infarct   - Known history of a.fib, takes Cardizem  QD at home   -wean o2 as able   -no sign volume overload     - BP stable   -continue hydralazine and cardizem     -hypokalemia noted, needs aggressive supplementation and repeat renal     -Anemia as per primary     - Monitor and replete lytes, keep K>4, Mg>2.  - Other cardiovascular workup will depend on clinical course.  - All other workup per primary team.  - Will continue to follow.    
73 yo M with PMHx of TBI , Afib on Eliquis, BPH, CAD, recurrent Aspiration PNA, Peripheral Neuropathy, and Depression presents to the ED with fever and elevated blood pressures, admitted for suspected aspiration pna.     
73 yo M with PMHx of TBI , Afib on Eliquis, BPH, CAD, recurrent Aspiration PNA, Peripheral Neuropathy, and Depression presents to the ED with fever and elevated blood pressures, admitted for suspected aspiration pna, plan for dc tomorrow 6/2. 
75 yo M with PMHx of TBI , Afib on Eliquis, BPH, CAD, recurrent Aspiration PNA, Peripheral Neuropathy, and Depression presents to the ED with fever and elevated blood pressures, admitted for suspected aspiration pna.

## 2024-06-01 NOTE — PROGRESS NOTE ADULT - PROBLEM SELECTOR PLAN 11
- Hold home Eliquis   - FD Lovenox for now. resume upon discharge
- Hold home Eliquis   - FD Lovenox for now. resume home Eliquis prior to discharge
- Hold home Eliquis   - FD Lovenox

## 2024-06-01 NOTE — DISCHARGE NOTE PROVIDER - CARE PROVIDER_API CALL
Richard Mathis  Gastroenterology  [address], [address]  [place] [address]  Phone: [phone]  Fax: [phone]  Follow Up Time:

## 2024-06-01 NOTE — DISCHARGE NOTE NURSING/CASE MANAGEMENT/SOCIAL WORK - PATIENT PORTAL LINK FT
TO ER BED 7 You can access the FollowMyHealth Patient Portal offered by [facility] by registering at the following website: [email or web address]. By joining [facility]’s FollowMyHealth portal, you will also be able to view your health information using other applications (apps) compatible with our system.

## 2024-06-01 NOTE — DISCHARGE NOTE NURSING/CASE MANAGEMENT/SOCIAL WORK - NSSCTYPOFSERV_GEN_ALL_CORE
Agency providing home health aide.  Requested for resumption Sunday Agency providing home health aide.  Requested for resumption Sunday    [facility]   Visiting Nurse and Home physical therapy

## 2024-06-01 NOTE — PROGRESS NOTE ADULT - PROBLEM SELECTOR PLAN 4
- BP is better controlled now   - Continue hydralazine 25 mg TID and Cardizem  mg daily
- hydralazine 25 mg TID and Cardizem  mg daily  - Hold PO medications   - Cardizem gtt @ 5mg/hr  - Hydralazine IVP PRN
-BP is better controlled now   - hydralazine 25 mg TID and Cardizem  mg daily  - Hold PO medications   - cont Cardizem oral daily. was on gtt initially   - Hydralazine IVP PRN

## 2024-06-01 NOTE — CASE MANAGEMENT PROGRESS NOTE - NSCMPROGRESSNOTE_GEN_ALL_CORE
Informed that patient is discharge ready.  Spoke with wife Mony on phone.  Patient has [facility] via [facility].  Spoke with [facility] [phone] and they confirmed D/C date for Sunday.  Will need D/C note faxed to [phone] when available.  Spoke with rep at [facility] [phone] whom confirmed HHA to be at residence 12p tomorrow.  They will need D/C note faxed to [phone].  Mony requesting 2pm ambulance  to return home.  [phone] for 2pm  Sunday.  Wife states she will reinstate all other home care services and has all DME necessary.  WIll remain available.

## 2024-06-01 NOTE — DISCHARGE NOTE PROVIDER - NSDCMRMEDTOKEN_GEN_ALL_CORE_FT
amoxicillin-clavulanate 875 mg-125 mg oral tablet: 1 tab(s) orally 2 times a day  apixaban 2.5 mg oral tablet: 1 tab(s) orally every 12 hours  cholecalciferol 10 mcg/0.25 mL (400 intl units/0.25 mL) oral liquid: 0.25 milliliter(s) orally once a day  dilTIAZem 240 mg/24 hours oral capsule, extended release: 1 cap(s) orally once a day  gabapentin 400 mg oral capsule: 1 cap(s) orally 3 times a day  hydrALAZINE 25 mg oral tablet: 1 tab(s) orally 3 times a day  latanoprost 0.005% ophthalmic solution: 1 drop(s) to each affected eye once a day (at bedtime)  mirtazapine 7.5 mg oral tablet: 1 tab(s) orally once a day  mupirocin 2% topical ointment: Apply topically to affected area 2 times a day to right lower extremity and right elbow  pantoprazole 40 mg oral delayed release tablet: 1 tab(s) orally once a day (before a meal)  Provigil 100 mg oral tablet: 1 tab(s) orally once a day

## 2024-06-01 NOTE — PROGRESS NOTE ADULT - SUBJECTIVE AND OBJECTIVE BOX
Covering [facility] DIVISION of INFECTIOUS DISEASE  LADONNA Chung, DANIELLA Becker G. Casimir SAVAGE, STEPHEN is a 74yMale , patient examined and chart reviewed.      INTERVAL HPI/ OVERNIGHT EVENTS:   Afebrile. No events.    PAST MEDICAL & SURGICAL HISTORY:  TBI (traumatic brain injury)  HTN (hypertension)  Atrial fibrillation  Peripheral neuropathy  Major depression  HLD (hyperlipidemia)  CAD (coronary artery disease)  BPH (benign prostatic hyperplasia)  Pneumonia due to COVID-19 virus  June 2022  Hemorrhage in the brain  H/O craniotomy      For details regarding the patient's social history, family history, and other miscellaneous elements, please refer the initial infectious diseases consultation and/or the admitting history and physical examination for this admission.    ROS:  Unable to obtain due to : pt's condition      No Known Allergies      Current inpatient medications :    ANTIBIOTICS/RELEVANT:  piperacillin/tazobactam IVPB.. 3.375 Gram(s) IV Intermittent every 8 hours      bisacodyl Suppository 10 milliGRAM(s) Rectal daily  diltiazem    milliGRAM(s) Oral daily  enoxaparin Injectable 60 milliGRAM(s) SubCutaneous every 12 hours  gabapentin 400 milliGRAM(s) Oral three times a day  guaiFENesin Oral Liquid (Sugar-Free) 200 milliGRAM(s) Oral every 6 hours PRN  hydrALAZINE 25 milliGRAM(s) Oral three times a day  latanoprost 0.005% Ophthalmic Solution 1 Drop(s) Both EYES at bedtime  mirtazapine 7.5 milliGRAM(s) Oral daily  mupirocin 2% Ointment 1 Application(s) Topical two times a day  ondansetron Injectable 4 milliGRAM(s) IV Push every 8 hours PRN  pantoprazole   Suspension 40 milliGRAM(s) Oral daily  sodium chloride 0.9%. 1000 milliLiter(s) IV Continuous <Continuous>      Objective:    05-31 @ 07:01  -  06-01 @ 07:00  --------------------------------------------------------  IN: 0 mL / OUT: 1000 mL / NET: -1000 mL      T(C): 37.2 (06-01-24 @ 12:09), Max: 37.2 (06-01-24 @ 12:09)  HR: 68 (06-01-24 @ 12:09) (68 - 81)  BP: 147/78 (06-01-24 @ 12:09) (147/78 - 160/97)  RR: 17 (06-01-24 @ 12:09) (17 - 18)  SpO2: 94% (06-01-24 @ 12:09) (93% - 95%)      Physical Exam:  General: no acute distress  Neck: supple, trachea midline  Lungs: decreased no wheeze/rhonchi  Cardiovascular: regular rate and rhythm, S1 S2  Abdomen: soft, nontender,  bowel sounds normal  Neurological:  awake  Skin: no rash  Extremities: no edema        LABS:                          9.5    6.07  )-----------( 196      ( 01 Jun 2024 06:20 )             30.0       05-31    143  |  113<H>  |  14  ----------------------------<  127<H>  4.0   |  25  |  1.00    Ca    8.0<L>      31 May 2024 20:42  Phos  2.6     05-31  Mg     1.8     05-31    TPro  5.7<L>  /  Alb  2.7<L>  /  TBili  0.3  /  DBili  x   /  AST  9<L>  /  ALT  10<L>  /  AlkPhos  66  05-31      MICROBIOLOGY:    Culture - Blood (collected 29 May 2024 20:45)  Source: .Blood Blood-Peripheral  Preliminary Report (01 Jun 2024 01:03):    No growth at 48 Hours    Culture - Blood (collected 29 May 2024 20:30)  Source: .Blood Blood-Peripheral  Preliminary Report (01 Jun 2024 01:03):    No growth at 48 Hours      RADIOLOGY & ADDITIONAL STUDIES:    ACC: [id number] EXAM:  CT CHEST   ORDERED BY: LEEANNE YUN     PROCEDURE DATE:  05/31/2024          INTERPRETATION:  INDICATION: Pneumonia    TECHNIQUE: A volumetric CT acquisition of the chest was obtained from the   thoracic inlet to the upper abdomen without the use of intravenous   contrast. Coronal and sagittal reconstructed images are provided.    COMPARISON: Chest CT 11/19/2023    FINDINGS:    Lungs/Airways/Pleura: Trace right and small left pleural effusions.   Tracheal mucous secretions.There is bronchial wall thickening in the   dependent lower lobes with ill-defined opacities, may reflect acute on   chronic aspiration.    Mediastinum/Lymph nodes: No thoracic adenopathy.    Heart and Vessels: Biatrial enlargement. Coronary artery calcification no   pericardial effusion.    Upper Abdomen: Unremarkable.    Osseous structures and Soft Tissues: No aggressive bone lesions.    IMPRESSION:  Suspect acute on chronic aspiration at the dependent bases. Trace pleural   effusions.          Assessment :  Pt is a 74M w/ PMHx of TBI, Afib on Eliquis, BPH, CAD, recurrent Aspiration PNA, Peripheral Neuropathy, and Depression presents to the ED with fever and elevated blood pressures, admitted for suspected aspiration pna.     Sepsis 2/2 Aspiration PNA  AHRF on NC  - pt noted to have hx of aspiration PNA  - presented w/ Temp 102, , WBC 14.96, suspected source viral vs aspiration pna   - RVP +entero/rhinovirus  - UA negative  - BCx NGTD  - CXR No radiographic evidence of active chest disease. Lateral radiograph recommended to evaluate posterior lung bases.  - Modified barium swallow done showed laryngeal penetration.   - CT chest with acute on chronic aspiration   - Sp fever WBC wnl    Plan:  C/w zosyn   Can dc on Augmentin x 5 days on discharge  Trend temps/WBC  Supportive care, O2 as needed--wean O2 as tolerated  Maintain aspiration precautions, S&S eval appreciated  Additional care per primary team     D/w Dr Bass    Continue with present regiment.  Appropriate use of antibiotics and adverse effects reviewed.      > 35 minutes were spent in direct patient care reviewing notes, medications ,labs data/ imaging , discussion with multidisciplinary team.    Thank you for allowing me to participate in care of your patient .    Meet Perea MD  Infectious Disease  [phone]

## 2024-06-01 NOTE — DISCHARGE NOTE PROVIDER - NSDCCPCAREPLAN_GEN_ALL_CORE_FT
PRINCIPAL DISCHARGE DIAGNOSIS  Diagnosis: Pneumonia  Assessment and Plan of Treatment: Pneumonia is an infection of the lungs. START Augmentin twice daily for 5 days. Do not stop taking the antibiotic even if you start to feel better. Do not use tobacco products, including cigarettes, chewing tobacco, and e-cigarettes.  SEEK IMMEDIATE MEDICAL CARE IF YOU HAVE ANY OF THE FOLLOWING SYMPTOMS: worsening shortness of breath, worsening chest pain, coughing up blood, change in mental status, lightheadedness/dizziness.        SECONDARY DISCHARGE DIAGNOSES  Diagnosis: HTN (hypertension)  Assessment and Plan of Treatment: Resume home medication regimen.

## 2024-06-02 VITALS
RESPIRATION RATE: 19 BRPM | SYSTOLIC BLOOD PRESSURE: 149 MMHG | DIASTOLIC BLOOD PRESSURE: 78 MMHG | OXYGEN SATURATION: 96 % | HEART RATE: 75 BPM | TEMPERATURE: 98 F

## 2024-06-02 LAB
ANION GAP SERPL CALC-SCNC: 5 MMOL/L — SIGNIFICANT CHANGE UP (ref 5–17)
BUN SERPL-MCNC: 11 MG/DL — SIGNIFICANT CHANGE UP (ref 7–23)
CALCIUM SERPL-MCNC: 8.6 MG/DL — SIGNIFICANT CHANGE UP (ref 8.5–10.1)
CHLORIDE SERPL-SCNC: 114 MMOL/L — HIGH (ref 96–108)
CO2 SERPL-SCNC: 25 MMOL/L — SIGNIFICANT CHANGE UP (ref 22–31)
CREAT SERPL-MCNC: 1.1 MG/DL — SIGNIFICANT CHANGE UP (ref 0.5–1.3)
EGFR: 71 ML/MIN/1.73M2 — SIGNIFICANT CHANGE UP
GLUCOSE SERPL-MCNC: 107 MG/DL — HIGH (ref 70–99)
HCT VFR BLD CALC: 29.9 % — LOW (ref 39–50)
HGB BLD-MCNC: 9.6 G/DL — LOW (ref 13–17)
MCHC RBC-ENTMCNC: 24.9 PG — LOW (ref 27–34)
MCHC RBC-ENTMCNC: 32.1 GM/DL — SIGNIFICANT CHANGE UP (ref 32–36)
MCV RBC AUTO: 77.5 FL — LOW (ref 80–100)
NRBC # BLD: 0 /100 WBCS — SIGNIFICANT CHANGE UP (ref 0–0)
PLATELET # BLD AUTO: 213 K/UL — SIGNIFICANT CHANGE UP (ref 150–400)
POTASSIUM SERPL-MCNC: 3.4 MMOL/L — LOW (ref 3.5–5.3)
POTASSIUM SERPL-SCNC: 3.4 MMOL/L — LOW (ref 3.5–5.3)
RBC # BLD: 3.86 M/UL — LOW (ref 4.2–5.8)
RBC # FLD: 16.6 % — HIGH (ref 10.3–14.5)
SODIUM SERPL-SCNC: 144 MMOL/L — SIGNIFICANT CHANGE UP (ref 135–145)
WBC # BLD: 6.07 K/UL — SIGNIFICANT CHANGE UP (ref 3.8–10.5)
WBC # FLD AUTO: 6.07 K/UL — SIGNIFICANT CHANGE UP (ref 3.8–10.5)

## 2024-06-02 PROCEDURE — A9698: CPT

## 2024-06-02 PROCEDURE — 84100 ASSAY OF PHOSPHORUS: CPT

## 2024-06-02 PROCEDURE — 82728 ASSAY OF FERRITIN: CPT

## 2024-06-02 PROCEDURE — 93005 ELECTROCARDIOGRAM TRACING: CPT

## 2024-06-02 PROCEDURE — 85027 COMPLETE CBC AUTOMATED: CPT

## 2024-06-02 PROCEDURE — 83605 ASSAY OF LACTIC ACID: CPT

## 2024-06-02 PROCEDURE — 99239 HOSP IP/OBS DSCHRG MGMT >30: CPT

## 2024-06-02 PROCEDURE — 92611 MOTION FLUOROSCOPY/SWALLOW: CPT

## 2024-06-02 PROCEDURE — 82746 ASSAY OF FOLIC ACID SERUM: CPT

## 2024-06-02 PROCEDURE — 0225U NFCT DS DNA&RNA 21 SARSCOV2: CPT

## 2024-06-02 PROCEDURE — 99285 EMERGENCY DEPT VISIT HI MDM: CPT | Mod: 25

## 2024-06-02 PROCEDURE — 83540 ASSAY OF IRON: CPT

## 2024-06-02 PROCEDURE — 85610 PROTHROMBIN TIME: CPT

## 2024-06-02 PROCEDURE — 96374 THER/PROPH/DIAG INJ IV PUSH: CPT

## 2024-06-02 PROCEDURE — 84145 PROCALCITONIN (PCT): CPT

## 2024-06-02 PROCEDURE — 80048 BASIC METABOLIC PNL TOTAL CA: CPT

## 2024-06-02 PROCEDURE — 71045 X-RAY EXAM CHEST 1 VIEW: CPT

## 2024-06-02 PROCEDURE — 85730 THROMBOPLASTIN TIME PARTIAL: CPT

## 2024-06-02 PROCEDURE — 83735 ASSAY OF MAGNESIUM: CPT

## 2024-06-02 PROCEDURE — 74230 X-RAY XM SWLNG FUNCJ C+: CPT

## 2024-06-02 PROCEDURE — 85025 COMPLETE CBC W/AUTO DIFF WBC: CPT

## 2024-06-02 PROCEDURE — 36415 COLL VENOUS BLD VENIPUNCTURE: CPT

## 2024-06-02 PROCEDURE — 82607 VITAMIN B-12: CPT

## 2024-06-02 PROCEDURE — 80053 COMPREHEN METABOLIC PANEL: CPT

## 2024-06-02 PROCEDURE — 87040 BLOOD CULTURE FOR BACTERIA: CPT

## 2024-06-02 PROCEDURE — 71250 CT THORAX DX C-: CPT | Mod: MC

## 2024-06-02 PROCEDURE — 83036 HEMOGLOBIN GLYCOSYLATED A1C: CPT

## 2024-06-02 PROCEDURE — 82272 OCCULT BLD FECES 1-3 TESTS: CPT

## 2024-06-02 PROCEDURE — 81001 URINALYSIS AUTO W/SCOPE: CPT

## 2024-06-02 PROCEDURE — 92610 EVALUATE SWALLOWING FUNCTION: CPT

## 2024-06-02 RX ORDER — POTASSIUM CHLORIDE 20 MEQ
40 PACKET (EA) ORAL ONCE
Refills: 0 | Status: COMPLETED | OUTPATIENT
Start: 2024-06-02 | End: 2024-06-02

## 2024-06-02 RX ADMIN — Medication 240 MILLIGRAM(S): at 06:04

## 2024-06-02 RX ADMIN — PIPERACILLIN AND TAZOBACTAM 25 GRAM(S): 4; .5 INJECTION, POWDER, LYOPHILIZED, FOR SOLUTION INTRAVENOUS at 00:33

## 2024-06-02 RX ADMIN — Medication 1 TABLET(S): at 06:04

## 2024-06-02 RX ADMIN — GABAPENTIN 400 MILLIGRAM(S): 400 CAPSULE ORAL at 13:39

## 2024-06-02 RX ADMIN — MUPIROCIN 1 APPLICATION(S): 20 OINTMENT TOPICAL at 06:08

## 2024-06-02 RX ADMIN — MIRTAZAPINE 7.5 MILLIGRAM(S): 45 TABLET, ORALLY DISINTEGRATING ORAL at 13:39

## 2024-06-02 RX ADMIN — ENOXAPARIN SODIUM 60 MILLIGRAM(S): 100 INJECTION SUBCUTANEOUS at 06:04

## 2024-06-02 RX ADMIN — PANTOPRAZOLE SODIUM 40 MILLIGRAM(S): 20 TABLET, DELAYED RELEASE ORAL at 13:39

## 2024-06-02 RX ADMIN — Medication 40 MILLIEQUIVALENT(S): at 08:45

## 2024-06-02 RX ADMIN — Medication 25 MILLIGRAM(S): at 06:04

## 2024-06-02 RX ADMIN — Medication 25 MILLIGRAM(S): at 13:39

## 2024-06-02 RX ADMIN — GABAPENTIN 400 MILLIGRAM(S): 400 CAPSULE ORAL at 06:04

## 2024-06-02 NOTE — CASE MANAGEMENT PROGRESS NOTE - NSCMPROGRESSNOTE_GEN_ALL_CORE
Patient is anticipated for DC home today with aide services and spouse. CM spoke with patient's spouse, Love, who confirmed aides are in place and ready to receive patient. DC summary faxed to [facility] and [facility] at fax: [phone] and , for reinstatement of aides. Ambulance has been arranged for 2pm transport home. Spouse Love in agreement to DC plans. CM remains available.

## 2024-06-02 NOTE — PROGRESS NOTE ADULT - SUBJECTIVE AND OBJECTIVE BOX
Covering [facility] DIVISION of INFECTIOUS DISEASE  LADONNA Chung, DANIELLA Becker G. Casimir SAVAGE, STEPHEN is a 74yMale , patient examined and chart reviewed.      INTERVAL HPI/ OVERNIGHT EVENTS:   Afebrile. No events.    PAST MEDICAL & SURGICAL HISTORY:  TBI (traumatic brain injury)  HTN (hypertension)  Atrial fibrillation  Peripheral neuropathy  Major depression  HLD (hyperlipidemia)  CAD (coronary artery disease)  BPH (benign prostatic hyperplasia)  Pneumonia due to COVID-19 virus  June 2022  Hemorrhage in the brain  H/O craniotomy      For details regarding the patient's social history, family history, and other miscellaneous elements, please refer the initial infectious diseases consultation and/or the admitting history and physical examination for this admission.    ROS:  Unable to obtain due to : pt's condition      No Known Allergies      Current inpatient medications :    ANTIBIOTICS/RELEVANT:  amoxicillin  875 milliGRAM(s)/clavulanate 1 Tablet(s) Oral two times a day    MEDICATIONS  (STANDING):  bisacodyl Suppository 10 milliGRAM(s) Rectal daily  diltiazem    milliGRAM(s) Oral daily  enoxaparin Injectable 60 milliGRAM(s) SubCutaneous every 12 hours  gabapentin 400 milliGRAM(s) Oral three times a day  hydrALAZINE 25 milliGRAM(s) Oral three times a day  latanoprost 0.005% Ophthalmic Solution 1 Drop(s) Both EYES at bedtime  mirtazapine 7.5 milliGRAM(s) Oral daily  mupirocin 2% Ointment 1 Application(s) Topical two times a day  pantoprazole   Suspension 40 milliGRAM(s) Oral daily  sodium chloride 0.9%. 1000 milliLiter(s) (100 mL/Hr) IV Continuous <Continuous>    MEDICATIONS  (PRN):  guaiFENesin Oral Liquid (Sugar-Free) 200 milliGRAM(s) Oral every 6 hours PRN Cough  ondansetron Injectable 4 milliGRAM(s) IV Push every 8 hours PRN Nausea and/or Vomiting      Objective:  Vital Signs Last 24 Hrs  T(C): 36.5 (02 Jun 2024 13:00), Max: 36.5 (02 Jun 2024 04:45)  T(F): 97.7 (02 Jun 2024 13:00), Max: 97.7 (02 Jun 2024 04:45)  HR: 75 (02 Jun 2024 13:00) (75 - 86)  BP: 149/78 (02 Jun 2024 13:00) (137/81 - 176/87)  RR: 19 (02 Jun 2024 13:00) (17 - 19)  SpO2: 96% (02 Jun 2024 13:00) (95% - 96%)    Parameters below as of 02 Jun 2024 13:00  Patient On (Oxygen Delivery Method): nasal cannula  O2 Flow (L/min): 2      Physical Exam:  General: weak  Neck: supple, trachea midline  Lungs: decreased no wheeze/rhonchi  Cardiovascular: regular rate and rhythm, S1 S2  Abdomen: soft, nontender,  bowel sounds normal  Neurological:  awake  Skin: no rash  Extremities: no edema        LABS:                          9.6    6.07  )-----------( 213      ( 02 Jun 2024 07:33 )             29.9   06-02    144  |  114<H>  |  11  ----------------------------<  107<H>  3.4<L>   |  25  |  1.10    Ca    8.6      02 Jun 2024 07:33    MICROBIOLOGY:    Culture - Blood (collected 29 May 2024 20:45)  Source: .Blood Blood-Peripheral  Preliminary Report (01 Jun 2024 01:03):    No growth at 48 Hours    Culture - Blood (collected 29 May 2024 20:30)  Source: .Blood Blood-Peripheral  Preliminary Report (01 Jun 2024 01:03):    No growth at 48 Hours      RADIOLOGY & ADDITIONAL STUDIES:    ACC: [id number] EXAM:  CT CHEST   ORDERED BY: LEEANNE YUN     PROCEDURE DATE:  05/31/2024          INTERPRETATION:  INDICATION: Pneumonia    TECHNIQUE: A volumetric CT acquisition of the chest was obtained from the   thoracic inlet to the upper abdomen without the use of intravenous   contrast. Coronal and sagittal reconstructed images are provided.    COMPARISON: Chest CT 11/19/2023    FINDINGS:    Lungs/Airways/Pleura: Trace right and small left pleural effusions.   Tracheal mucous secretions.There is bronchial wall thickening in the   dependent lower lobes with ill-defined opacities, may reflect acute on   chronic aspiration.    Mediastinum/Lymph nodes: No thoracic adenopathy.    Heart and Vessels: Biatrial enlargement. Coronary artery calcification no   pericardial effusion.    Upper Abdomen: Unremarkable.    Osseous structures and Soft Tissues: No aggressive bone lesions.    IMPRESSION:  Suspect acute on chronic aspiration at the dependent bases. Trace pleural   effusions.          Assessment :  Pt is a 74M w/ PMHx of TBI, Afib on Eliquis, BPH, CAD, recurrent Aspiration PNA, Peripheral Neuropathy, and Depression presents to the ED with fever and elevated blood pressures, admitted for suspected aspiration pna.     Sepsis 2/2 Aspiration PNA  AHRF on NC  - pt noted to have hx of aspiration PNA  - presented w/ Temp 102, , WBC 14.96, suspected source viral vs aspiration pna   - RVP +entero/rhinovirus  - UA negative  - BCx NGTD  - CXR No radiographic evidence of active chest disease. Lateral radiograph recommended to evaluate posterior lung bases.  - Modified barium swallow done showed laryngeal penetration.   - CT chest with acute on chronic aspiration   - Sp fever WBC wnl  - Cultures NGTS    Plan:  Off zosyn--> Augmentin x 5 days on discharge  Trend temps/WBC  Supportive care, O2 as needed--wean O2 as tolerated  Maintain aspiration precautions, S&S eval appreciated  Additional care per primary team   Stable from ID standpoint    Continue with present regiment.  Appropriate use of antibiotics and adverse effects reviewed.      > 35 minutes were spent in direct patient care reviewing notes, medications ,labs data/ imaging , discussion with multidisciplinary team.    Thank you for allowing me to participate in care of your patient .    Meet Perea MD  Infectious Disease  [phone]

## 2024-06-05 RX ORDER — MODAFINIL 100 MG/1
100 TABLET ORAL DAILY
Refills: 0 | Status: ACTIVE | COMMUNITY
Start: 2024-06-05

## 2024-06-05 RX ORDER — CHOLECALCIFEROL (VITAMIN D3) 1MM UNIT/G
LIQUID (GRAM) MISCELLANEOUS
Refills: 0 | Status: ACTIVE | COMMUNITY
Start: 2024-06-05

## 2024-06-05 RX ORDER — MUPIROCIN 20 MG/G
2 OINTMENT TOPICAL
Refills: 0 | Status: ACTIVE | COMMUNITY
Start: 2024-06-05

## 2024-06-05 RX ORDER — MODAFINIL 200 MG/1
TABLET ORAL
Refills: 0 | Status: COMPLETED | COMMUNITY
End: 2024-06-05

## 2024-06-16 NOTE — PROGRESS NOTE ADULT - PROBLEM SELECTOR PLAN 4
- poorly controlled with SBP 150s , DBP 100s  - d/w NSGY ; given SDH ; BP goal is SBP < 160  - on Dilitiazem CD 420mg po daily  - will start HCTZ 12.5mg po daily for better diastolic BP control - poorly controlled with SBP 150s , DBP 100s  - d/w NSGY ; given SDH ; BP goal is SBP < 160  - on Dilitiazem CD 420mg po daily  - will restart other home meds : Hydralazine 50mg po tid ( will switch to IV as pt often refuses PO medications )  - restart home med Losartan ( home dose is 50mg po bid ; however will start 50mg po daily for now ) English - poorly controlled with SBP 150s , DBP 100s  - d/w NSGY ; given SDH ; BP goal is SBP < 160  - on Dilitiazem CD 120mg po daily  - will restart other home meds : Hydralazine 50mg po tid ( will switch to IV as pt often refuses PO medications )  - restart home med Losartan ( home dose is 50mg po bid ; however will start 50mg po daily for now )

## 2024-06-18 RX ORDER — PANTOPRAZOLE 40 MG/1
40 TABLET, DELAYED RELEASE ORAL
Qty: 1 | Refills: 3 | Status: ACTIVE | COMMUNITY
Start: 2023-09-11 | End: 1900-01-01

## 2024-06-19 ENCOUNTER — APPOINTMENT (OUTPATIENT)
Dept: INTERNAL MEDICINE | Facility: CLINIC | Age: 74
End: 2024-06-19
Payer: COMMERCIAL

## 2024-06-19 VITALS
HEIGHT: 66 IN | DIASTOLIC BLOOD PRESSURE: 76 MMHG | SYSTOLIC BLOOD PRESSURE: 140 MMHG | TEMPERATURE: 98 F | BODY MASS INDEX: 20.09 KG/M2 | WEIGHT: 125 LBS

## 2024-06-19 DIAGNOSIS — I48.91 UNSPECIFIED ATRIAL FIBRILLATION: ICD-10-CM

## 2024-06-19 DIAGNOSIS — I10 ESSENTIAL (PRIMARY) HYPERTENSION: ICD-10-CM

## 2024-06-19 DIAGNOSIS — A41.9 SEPSIS, UNSPECIFIED ORGANISM: ICD-10-CM

## 2024-06-19 PROCEDURE — 99495 TRANSJ CARE MGMT MOD F2F 14D: CPT | Mod: 95

## 2024-06-19 NOTE — PHYSICAL EXAM
[78598 - Moderate Complexity requires multiple possible diagnoses and/or the management options, moderate complexity of the medical data (tests, etc.) to be reviewed, and moderate risk of significant complications, morbidity, and/or mortality as well as co] : Moderate Complexity

## 2024-06-19 NOTE — HISTORY OF PRESENT ILLNESS
[Post-hospitalization from ___ Hospital] : Post-hospitalization from [unfilled] Hospital [Admitted on: ___] : The patient was admitted on [unfilled] [Discharged on ___] : discharged on [unfilled] [Discharge Summary] : discharge summary [Discharge Med List] : discharge medication list [FreeTextEntry2] : Hospitalized with sepsis Improved with antibiotics and discharge on amoxil clav meds adjusted Meds adjusted Patient weakness progressive Need constant turning and repositioning to prevent skin breakdown

## 2024-06-19 NOTE — HEALTH RISK ASSESSMENT
[No] : No [Never (0 pts)] : Never (0 points) [No falls in past year] : Patient reported no falls in the past year [1] : 2) Feeling down, depressed, or hopeless for several days (1) [PHQ-2 Negative - No further assessment needed] : PHQ-2 Negative - No further assessment needed [[id number]] : 2

## 2024-06-19 NOTE — PLAN
[FreeTextEntry1] : Meds carefully reconciled bp reportedly very good Eliques for afib progressive weakness, and inability to turn or assist in transfer Need for fully electric bed for frequent repositioning  and change of height of bed to assist in transfer and prevent further skin breakdown

## 2024-07-03 NOTE — H&P ADULT - PROBLEM/PLAN-7
What Type Of Note Output Would You Prefer (Optional)?: Bullet Format What Is The Reason For Today's Visit?: Initial Full Body Skin Examination What Is The Reason For Today's Visit? (Being Monitored For X): concerning skin lesions on a periodic basis DISPLAY PLAN FREE TEXT

## 2024-07-10 NOTE — DISCHARGE NOTE NURSING/CASE MANAGEMENT/SOCIAL WORK - NSDCPEFALRISK_GEN_ALL_CORE
No issues at this time.    For information on Fall & Injury Prevention, visit: [email or web address] OR  [email or web address] OR  https://www.cdc.gov/steadi/patient.html

## 2024-09-09 NOTE — PROGRESS NOTE ADULT - PROBLEM SELECTOR PROBLEM 3
[FreeTextEntry2] : Robb Randall MD (GI/ref)
[FreeTextEntry3] : Sabrina Navarro MD\par  Attending Surgeon\par  Division of Thoracic Surgery\par  , [facility] [employer] at [facility]\par  
Chronic atrial fibrillation

## 2024-09-30 ENCOUNTER — RX RENEWAL (OUTPATIENT)
Age: 74
End: 2024-09-30

## 2024-10-14 DIAGNOSIS — B36.9 SUPERFICIAL MYCOSIS, UNSPECIFIED: ICD-10-CM

## 2024-10-14 RX ORDER — NYSTATIN 100000 [USP'U]/G
100000 POWDER TOPICAL TWICE DAILY
Qty: 1 | Refills: 1 | Status: ACTIVE | COMMUNITY
Start: 2024-10-14 | End: 1900-01-01

## 2024-10-17 ENCOUNTER — APPOINTMENT (OUTPATIENT)
Dept: INTERNAL MEDICINE | Facility: CLINIC | Age: 74
End: 2024-10-17
Payer: COMMERCIAL

## 2024-10-17 VITALS
HEART RATE: 84 BPM | OXYGEN SATURATION: 96 % | SYSTOLIC BLOOD PRESSURE: 140 MMHG | WEIGHT: 135 LBS | BODY MASS INDEX: 21.69 KG/M2 | HEIGHT: 66 IN | DIASTOLIC BLOOD PRESSURE: 80 MMHG

## 2024-10-17 DIAGNOSIS — K21.9 GASTRO-ESOPHAGEAL REFLUX DISEASE W/OUT ESOPHAGITIS: ICD-10-CM

## 2024-10-17 DIAGNOSIS — I10 ESSENTIAL (PRIMARY) HYPERTENSION: ICD-10-CM

## 2024-10-17 DIAGNOSIS — E11.9 TYPE 2 DIABETES MELLITUS W/OUT COMPLICATIONS: ICD-10-CM

## 2024-10-17 DIAGNOSIS — E55.9 VITAMIN D DEFICIENCY, UNSPECIFIED: ICD-10-CM

## 2024-10-17 DIAGNOSIS — S09.90XS UNSPECIFIED INJURY OF HEAD, SEQUELA: ICD-10-CM

## 2024-10-17 DIAGNOSIS — N40.0 BENIGN PROSTATIC HYPERPLASIA WITHOUT LOWER URINARY TRACT SYMPMS: ICD-10-CM

## 2024-10-17 PROCEDURE — G0008: CPT

## 2024-10-17 PROCEDURE — 90662 IIV NO PRSV INCREASED AG IM: CPT

## 2024-10-17 PROCEDURE — 99213 OFFICE O/P EST LOW 20 MIN: CPT | Mod: 25

## 2024-10-21 ENCOUNTER — LABORATORY RESULT (OUTPATIENT)
Age: 74
End: 2024-10-21

## 2024-11-04 ENCOUNTER — RX RENEWAL (OUTPATIENT)
Age: 74
End: 2024-11-04

## 2024-11-29 NOTE — PATIENT PROFILE ADULT - PATIENT'S SEXUAL ORIENTATION
APPTS ARE READY TO BE MADE: [x] YES    Best Family or Patient Contact (if needed):    Additional Information about above appointments (if needed):    1: Heart Failure  2:   3:     Other comments or requests:   
Heterosexual

## 2024-12-01 NOTE — HISTORY OF PRESENT ILLNESS
Patient is a 85 y/o female presenting to ED as trauma via CFD s/p fall on blood thinners. Patient's daughter and son indicated as emergency contacts: Lexy Camargo [phone]/Clyde Camargo [phone]. Crisis to support as needed.    [FreeTextEntry1] : 69 year old man with a history of permanent atrial fibrillation, HTN, HLD, allergic conjunctivitis who is here for a followup.  \par \par He has been doing well. \par He is doing well. He still exercises with playing tennis and softball regularly. he recently had a hand surgery. He has been having mild dyspnea on exertion. He denies palpitations, dyspnea, PND, orthopnea, lower extremity edema, LOC. He has been taking his Eliquis as prescribed without any melena, excessive bleeding, bruising.\par He has been trying to stay better hydrated. \par He is compliant with his medications.  He is planning to go to [facility] for two weeks in 1/2020. \par

## 2025-01-14 ENCOUNTER — RX RENEWAL (OUTPATIENT)
Age: 75
End: 2025-01-14

## 2025-02-02 NOTE — SWALLOW VFSS/MBS ASSESSMENT ADULT - THE ABOVE FINDINGS WERE DISCUSSED WITH
[email or web address]
Physician/Patient

## 2025-03-11 ENCOUNTER — RX RENEWAL (OUTPATIENT)
Age: 75
End: 2025-03-11

## 2025-03-21 NOTE — REASON FOR VISIT
Bed: 12  Expected date:   Expected time:   Means of arrival:   Comments:  TRAUMA    [Consultation] : a consultation visit [Referred By: _________] : Patient was referred by ERIC [Spouse] : spouse

## 2025-03-28 ENCOUNTER — RX RENEWAL (OUTPATIENT)
Age: 75
End: 2025-03-28

## 2025-03-28 RX ORDER — DILTIAZEM HYDROCHLORIDE 240 MG/1
240 CAPSULE, EXTENDED RELEASE ORAL
Qty: 90 | Refills: 0 | Status: ACTIVE | COMMUNITY
Start: 2025-03-28 | End: 1900-01-01

## 2025-04-08 ENCOUNTER — NON-APPOINTMENT (OUTPATIENT)
Age: 75
End: 2025-04-08

## 2025-04-08 ENCOUNTER — APPOINTMENT (OUTPATIENT)
Dept: CARDIOLOGY | Facility: CLINIC | Age: 75
End: 2025-04-08
Payer: COMMERCIAL

## 2025-04-08 VITALS
DIASTOLIC BLOOD PRESSURE: 89 MMHG | HEIGHT: 66 IN | OXYGEN SATURATION: 99 % | SYSTOLIC BLOOD PRESSURE: 136 MMHG | HEART RATE: 79 BPM

## 2025-04-08 DIAGNOSIS — I48.91 UNSPECIFIED ATRIAL FIBRILLATION: ICD-10-CM

## 2025-04-08 PROCEDURE — 93000 ELECTROCARDIOGRAM COMPLETE: CPT

## 2025-04-08 PROCEDURE — 99214 OFFICE O/P EST MOD 30 MIN: CPT

## 2025-04-08 PROCEDURE — G2211 COMPLEX E/M VISIT ADD ON: CPT | Mod: NC

## 2025-04-15 ENCOUNTER — APPOINTMENT (OUTPATIENT)
Dept: INTERNAL MEDICINE | Facility: CLINIC | Age: 75
End: 2025-04-15
Payer: COMMERCIAL

## 2025-04-15 VITALS
DIASTOLIC BLOOD PRESSURE: 84 MMHG | WEIGHT: 135 LBS | OXYGEN SATURATION: 95 % | SYSTOLIC BLOOD PRESSURE: 170 MMHG | HEART RATE: 78 BPM | TEMPERATURE: 93.7 F | BODY MASS INDEX: 21.69 KG/M2 | HEIGHT: 66 IN

## 2025-04-15 DIAGNOSIS — E78.00 PURE HYPERCHOLESTEROLEMIA, UNSPECIFIED: ICD-10-CM

## 2025-04-15 DIAGNOSIS — E53.8 DEFICIENCY OF OTHER SPECIFIED B GROUP VITAMINS: ICD-10-CM

## 2025-04-15 DIAGNOSIS — K21.9 GASTRO-ESOPHAGEAL REFLUX DISEASE W/OUT ESOPHAGITIS: ICD-10-CM

## 2025-04-15 DIAGNOSIS — Z00.00 ENCOUNTER FOR GENERAL ADULT MEDICAL EXAMINATION W/OUT ABNORMAL FINDINGS: ICD-10-CM

## 2025-04-15 DIAGNOSIS — J45.909 UNSPECIFIED ASTHMA, UNCOMPLICATED: ICD-10-CM

## 2025-04-15 DIAGNOSIS — L03.115 CELLULITIS OF RIGHT LOWER LIMB: ICD-10-CM

## 2025-04-15 DIAGNOSIS — E11.9 TYPE 2 DIABETES MELLITUS W/OUT COMPLICATIONS: ICD-10-CM

## 2025-04-15 DIAGNOSIS — N40.0 BENIGN PROSTATIC HYPERPLASIA WITHOUT LOWER URINARY TRACT SYMPMS: ICD-10-CM

## 2025-04-15 DIAGNOSIS — I10 ESSENTIAL (PRIMARY) HYPERTENSION: ICD-10-CM

## 2025-04-15 DIAGNOSIS — Z85.828 PERSONAL HISTORY OF OTHER MALIGNANT NEOPLASM OF SKIN: ICD-10-CM

## 2025-04-15 PROCEDURE — 99397 PER PM REEVAL EST PAT 65+ YR: CPT

## 2025-04-15 RX ORDER — SILVER SULFADIAZINE 10 MG/G
1 CREAM TOPICAL
Qty: 1 | Refills: 5 | Status: ACTIVE | COMMUNITY
Start: 2025-04-15 | End: 1900-01-01

## 2025-04-15 RX ORDER — ALBUTEROL SULFATE 2.5 MG/3ML
(2.5 MG/3ML) SOLUTION RESPIRATORY (INHALATION)
Qty: 1 | Refills: 5 | Status: ACTIVE | COMMUNITY
Start: 2025-04-15 | End: 1900-01-01

## 2025-04-15 RX ORDER — CEPHALEXIN 500 MG/1
500 CAPSULE ORAL
Qty: 21 | Refills: 1 | Status: ACTIVE | COMMUNITY
Start: 2025-04-15 | End: 1900-01-01

## 2025-04-17 ENCOUNTER — EMERGENCY (EMERGENCY)
Facility: HOSPITAL | Age: 75
LOS: 1 days | End: 2025-04-17
Attending: STUDENT IN AN ORGANIZED HEALTH CARE EDUCATION/TRAINING PROGRAM | Admitting: STUDENT IN AN ORGANIZED HEALTH CARE EDUCATION/TRAINING PROGRAM
Payer: COMMERCIAL

## 2025-04-17 VITALS
TEMPERATURE: 98 F | HEIGHT: 65 IN | HEART RATE: 108 BPM | WEIGHT: 130.07 LBS | DIASTOLIC BLOOD PRESSURE: 87 MMHG | RESPIRATION RATE: 18 BRPM | SYSTOLIC BLOOD PRESSURE: 146 MMHG | OXYGEN SATURATION: 99 %

## 2025-04-17 VITALS
OXYGEN SATURATION: 97 % | HEART RATE: 91 BPM | TEMPERATURE: 98 F | SYSTOLIC BLOOD PRESSURE: 142 MMHG | RESPIRATION RATE: 17 BRPM | DIASTOLIC BLOOD PRESSURE: 99 MMHG

## 2025-04-17 DIAGNOSIS — Z98.890 OTHER SPECIFIED POSTPROCEDURAL STATES: Chronic | ICD-10-CM

## 2025-04-17 LAB
ALBUMIN SERPL ELPH-MCNC: 3.5 G/DL — SIGNIFICANT CHANGE UP (ref 3.3–5)
ALP SERPL-CCNC: 119 U/L — SIGNIFICANT CHANGE UP (ref 40–120)
ALT FLD-CCNC: 19 U/L — SIGNIFICANT CHANGE UP (ref 12–78)
ANION GAP SERPL CALC-SCNC: 5 MMOL/L — SIGNIFICANT CHANGE UP (ref 5–17)
AST SERPL-CCNC: 31 U/L — SIGNIFICANT CHANGE UP (ref 15–37)
BASOPHILS # BLD AUTO: 0.04 K/UL — SIGNIFICANT CHANGE UP (ref 0–0.2)
BASOPHILS NFR BLD AUTO: 0.3 % — SIGNIFICANT CHANGE UP (ref 0–2)
BILIRUB SERPL-MCNC: 0.7 MG/DL — SIGNIFICANT CHANGE UP (ref 0.2–1.2)
BUN SERPL-MCNC: 16 MG/DL — SIGNIFICANT CHANGE UP (ref 7–23)
CALCIUM SERPL-MCNC: 9.6 MG/DL — SIGNIFICANT CHANGE UP (ref 8.5–10.1)
CHLORIDE SERPL-SCNC: 108 MMOL/L — SIGNIFICANT CHANGE UP (ref 96–108)
CO2 SERPL-SCNC: 28 MMOL/L — SIGNIFICANT CHANGE UP (ref 22–31)
CREAT SERPL-MCNC: 1.2 MG/DL — SIGNIFICANT CHANGE UP (ref 0.5–1.3)
EGFR: 64 ML/MIN/1.73M2 — SIGNIFICANT CHANGE UP
EGFR: 64 ML/MIN/1.73M2 — SIGNIFICANT CHANGE UP
EOSINOPHIL # BLD AUTO: 0.04 K/UL — SIGNIFICANT CHANGE UP (ref 0–0.5)
EOSINOPHIL NFR BLD AUTO: 0.3 % — SIGNIFICANT CHANGE UP (ref 0–6)
FLUAV AG NPH QL: SIGNIFICANT CHANGE UP
FLUBV AG NPH QL: SIGNIFICANT CHANGE UP
GLUCOSE SERPL-MCNC: 158 MG/DL — HIGH (ref 70–99)
HCT VFR BLD CALC: 44.5 % — SIGNIFICANT CHANGE UP (ref 39–50)
HGB BLD-MCNC: 14.1 G/DL — SIGNIFICANT CHANGE UP (ref 13–17)
IMM GRANULOCYTES NFR BLD AUTO: 0.8 % — SIGNIFICANT CHANGE UP (ref 0–0.9)
LYMPHOCYTES # BLD AUTO: 1.3 K/UL — SIGNIFICANT CHANGE UP (ref 1–3.3)
LYMPHOCYTES # BLD AUTO: 10.6 % — LOW (ref 13–44)
MCHC RBC-ENTMCNC: 23.8 PG — LOW (ref 27–34)
MCHC RBC-ENTMCNC: 31.7 G/DL — LOW (ref 32–36)
MCV RBC AUTO: 75.2 FL — LOW (ref 80–100)
MONOCYTES # BLD AUTO: 0.52 K/UL — SIGNIFICANT CHANGE UP (ref 0–0.9)
MONOCYTES NFR BLD AUTO: 4.3 % — SIGNIFICANT CHANGE UP (ref 2–14)
NEUTROPHILS # BLD AUTO: 10.23 K/UL — HIGH (ref 1.8–7.4)
NEUTROPHILS NFR BLD AUTO: 83.7 % — HIGH (ref 43–77)
NRBC BLD AUTO-RTO: 0 /100 WBCS — SIGNIFICANT CHANGE UP (ref 0–0)
PLATELET # BLD AUTO: 349 K/UL — SIGNIFICANT CHANGE UP (ref 150–400)
POTASSIUM SERPL-MCNC: 4.5 MMOL/L — SIGNIFICANT CHANGE UP (ref 3.5–5.3)
POTASSIUM SERPL-SCNC: 4.5 MMOL/L — SIGNIFICANT CHANGE UP (ref 3.5–5.3)
PROCALCITONIN SERPL-MCNC: 0.2 NG/ML — HIGH
PROT SERPL-MCNC: 8.1 G/DL — SIGNIFICANT CHANGE UP (ref 6–8.3)
RBC # BLD: 5.92 M/UL — HIGH (ref 4.2–5.8)
RBC # FLD: 19 % — HIGH (ref 10.3–14.5)
RSV RNA NPH QL NAA+NON-PROBE: SIGNIFICANT CHANGE UP
SARS-COV-2 RNA SPEC QL NAA+PROBE: SIGNIFICANT CHANGE UP
SODIUM SERPL-SCNC: 141 MMOL/L — SIGNIFICANT CHANGE UP (ref 135–145)
SOURCE RESPIRATORY: SIGNIFICANT CHANGE UP
WBC # BLD: 12.23 K/UL — HIGH (ref 3.8–10.5)
WBC # FLD AUTO: 12.23 K/UL — HIGH (ref 3.8–10.5)

## 2025-04-17 PROCEDURE — 71045 X-RAY EXAM CHEST 1 VIEW: CPT | Mod: 26

## 2025-04-17 PROCEDURE — 99284 EMERGENCY DEPT VISIT MOD MDM: CPT | Mod: 25

## 2025-04-17 PROCEDURE — 71250 CT THORAX DX C-: CPT | Mod: MC

## 2025-04-17 PROCEDURE — 87637 SARSCOV2&INF A&B&RSV AMP PRB: CPT

## 2025-04-17 PROCEDURE — 36415 COLL VENOUS BLD VENIPUNCTURE: CPT

## 2025-04-17 PROCEDURE — 80053 COMPREHEN METABOLIC PANEL: CPT

## 2025-04-17 PROCEDURE — 85025 COMPLETE CBC W/AUTO DIFF WBC: CPT

## 2025-04-17 PROCEDURE — 96374 THER/PROPH/DIAG INJ IV PUSH: CPT

## 2025-04-17 PROCEDURE — 71045 X-RAY EXAM CHEST 1 VIEW: CPT

## 2025-04-17 PROCEDURE — 84145 PROCALCITONIN (PCT): CPT

## 2025-04-17 PROCEDURE — 99284 EMERGENCY DEPT VISIT MOD MDM: CPT

## 2025-04-17 PROCEDURE — 87040 BLOOD CULTURE FOR BACTERIA: CPT

## 2025-04-17 PROCEDURE — 71250 CT THORAX DX C-: CPT | Mod: 26

## 2025-04-17 RX ORDER — ACETAMINOPHEN 500 MG/5ML
1000 LIQUID (ML) ORAL ONCE
Refills: 0 | Status: COMPLETED | OUTPATIENT
Start: 2025-04-17 | End: 2025-04-17

## 2025-04-17 RX ADMIN — Medication 400 MILLIGRAM(S): at 22:45

## 2025-04-17 NOTE — ED PROVIDER NOTE - PATIENT PORTAL LINK FT
You can access the FollowMyHealth Patient Portal offered by [facility] by registering at the following website: [email or web address]. By joining [employer]’s FollowMyHealth portal, you will also be able to view your health information using other applications (apps) compatible with our system.

## 2025-04-17 NOTE — ED PROVIDER NOTE - NSFOLLOWUPINSTRUCTIONS_ED_ALL_ED_FT
What is an acute cough? An acute cough can last up to 3 weeks. Common causes of an acute cough include a cold, allergies, or a lung infection.    How is the cause of an acute cough diagnosed? Your healthcare provider will examine you and listen to your lungs. Tell your healthcare provider if you cough up any mucus, or have a fever or shortness of breath. Also tell your provider what makes the cough better or worse. Depending on your symptoms, you may need a chest x-ray. A sample of mucus may be collected and tested for infection.    How is an acute cough treated? An acute cough usually goes away on its own. Ask your healthcare provider about medicines you can take to decrease your cough. You may need medicine to stop the cough, decrease swelling in your airways, or help open your airways. Medicine may also be given to help you cough up mucus. If you have an infection caused by bacteria, you may need antibiotics.    What can I do to manage my cough?    Do not smoke and stay away from others who smoke. Nicotine and other chemicals in cigarettes and cigars can cause lung damage and make your cough worse. Ask your healthcare provider for information if you currently smoke and need help to quit. E-cigarettes or smokeless tobacco still contain nicotine. Talk to your healthcare provider before you use these products.    Drink extra liquids as directed. Liquids will help thin and loosen mucus so you can cough it up. Liquids will also help prevent dehydration. Examples of good liquids to drink include water, fruit juice, and broth. Do not drink liquids that contain caffeine. Caffeine can increase your risk for dehydration. Ask your healthcare provider how much liquid to drink each day.    Rest as directed. Do not do activities that make your cough worse, such as exercise.    Use a humidifier or vaporizer. Use a cool mist humidifier or a vaporizer to increase air moisture in your home. This may make it easier for you to breathe and help decrease your cough.  Humidifier      Eat 2 to 5 mL of honey 2 times each day. Honey can help thin mucus and decrease your cough.    Use cough drops or lozenges. These can help decrease throat irritation and your cough.  When should I seek immediate care?    You have trouble breathing or feel short of breath.    You cough up blood, or you see blood in your mucus.    You faint or feel weak or dizzy.    You have chest pain when you cough or take a deep breath.    You have new wheezing.  When should I contact my healthcare provider?    You have a fever.    Your cough lasts longer than 4 weeks.    Your symptoms do not improve with treatment.    You have questions or concerns about your condition or care.  CARE AGREEMENT:    You have the right to help plan your care. Learn about your health condition and how it may be treated. Discuss treatment options with your healthcare providers to decide what care you want to receive. You always have the right to refuse treatment.

## 2025-04-17 NOTE — ED ADULT NURSE NOTE - OBJECTIVE STATEMENT
Pt arrived to ED w/ sig hx of TBI, a-fib, BPH, CAD, HLD, HTN, depression, asp PNA. Accompanied by wife due to congestion and cough. A/O x 0 at baseline, observed pt w/ wet cough, unable to clear secretions. NAD at this time, VS stable. Pt unable to verbalize complaints. Labs sent, IV access not flushing well and discontinued as likely infiltrated. EKG done, CT done. Wife at bedside, bed in lowest position, safety maintained, nursing care ongoing.

## 2025-04-17 NOTE — ED PROVIDER NOTE - RESPIRATORY, MLM
[address] [facility]   Department of Internal Medicine   Internal Medicine Residency  MICU Progress Note    Patient:  Brandy Andujar 54 y.o. male   MRN: [id number]       Date of Service: 2019    Allergy: No known allergies    Subjective     Patient was seen and examined in AM. Patient states feels better. Denies N/V or abdominal pain. No complaints. 24 hour change: AG was closed x 1     Objective     TEMPERATURE:  Current - Temp: 98.1 ? F (36.7 ? C); Max - Temp  Av.6 ? F (37 ?C)  Min: 98.1 ? F (36.7 ? C)  Max: 98.9 ? F (37.2 ? C)  RESPIRATIONS RANGE: Resp  Av.7  Min: 13  Max: 23  PULSE RANGE: Pulse  Av.7  Min: 71  Max: 107  BLOOD PRESSURE RANGE:  Systolic (59GHS), SJH:928 , Min:121 , YRE:659   ; Diastolic (10NMD), CRE:02, Min:79, Max:113    PULSE OXIMETRY RANGE: SpO2  Av.2 %  Min: 94 %  Max: 100 %    I & O - 24hr:    Intake/Output Summary (Last 24 hours) at 2019 0952  Last data filed at 2019 0900  Gross per 24 hour   Intake 4744 ml   Output 4820 ml   Net -76 ml     I/O last 3 completed shifts: In: 3239 [P.O.:1080; I.V.:3484]  Out: 4770 [Urine:4770] I/O this shift:  In: 300 [P.O.:300]  Out: 350 [Urine:350]   Weight change: 10 lb 0.8 oz (4.558 kg)    Physical Exam:  General Appearance:    Alert, cooperative, no acute distress. HEENT:    Clean and dry incisional wound, mucous membranes are moist   Neck:   Supple, no jugular venous distention. Resp:     CTAB, No wheezes, No rhonchi, no use of accessory muscles   Heart:    RRR, S1 and S2 normal, no murmur, rub or gallop.     Abdomen:     Soft, non-tender, non-distended with normal bowel sounds   Extremities:   Atraumatic, no cyanosis or edema   Pulses:  Radial and pedal pulses are intact bilaterally   Neurologic:   AAOx3, No focal motor deficit       Medications     Continuous Infusions:  Scheduled Meds:   sodium chloride flush  10 mL Intravenous 2 times per day    enoxaparin  40 mg Subcutaneous Daily    pantoprazole  40 mg Oral QAM AC    aspirin  81 mg Oral Daily    atorvastatin  80 mg Oral Nightly    lisinopril  20 mg Oral Daily    folic acid  1 mg Oral Daily    thiamine  100 mg Oral Daily     PRN Meds: sodium chloride flush, ondansetron, dextrose  Nutrition:   NPO    Labs and Imaging Studies     CBC:   Recent Labs     04/12/19  2152 04/13/19  0510 04/14/19  0530   WBC 28.8* 22.3* 14.1*   HGB 14.4 12.8 13.0   HCT 43.5 38.6 37.7   MCV 94.6 93.2 87.5    302 325       BMP:    Recent Labs     04/13/19  1550 04/13/19  2100 04/14/19  0530   * 134 140   K 4.3 3.9 3.9   CL 99 102 106   CO2 19* 23 24   BUN 12 9 7   CREATININE 0.7 0.8 0.6*   GLUCOSE 230* 179* 84       LIVER PROFILE:   No results for input(s): AST, ALT, LIPASE, BILIDIR, BILITOT, ALKPHOS in the last 72 hours. Invalid input(s): AMYLASE,  ALB    PT/INR:   No results for input(s): PROTIME, INR in the last 72 hours. APTT:   No results for input(s): APTT in the last 72 hours. Fasting Lipid Panel:    Lab Results   Component Value Date    CHOL 144 11/11/2018    TRIG 73 11/11/2018    HDL 65 11/11/2018       Cardiac Enzymes:    Lab Results   Component Value Date    CKTOTAL 65 11/29/2017    CKTOTAL 56 03/02/2017    CKTOTAL 433 (H) 02/11/2017    CKMB 3.7 11/29/2017    CKMB 3.2 03/02/2017    CKMB 12.5 (H) 02/11/2017    TROPONINI <0.01 04/12/2019    TROPONINI <0.01 12/25/2018    TROPONINI 0.01 12/24/2018       Notable Cultures:      Blood cultures   Blood Culture, Routine   Date Value Ref Range Status   04/12/2019 24 Hours- no growth  Preliminary     Respiratory cultures No results found for: RESPCULTURE No results found for: LABGRAM  Urine   Urine Culture, Routine   Date Value Ref Range Status   04/12/2019 Growth not present, incubation continues  Preliminary     Legionella No results found for: LABLEGI  C Diff PCR No results found for: CDIFPCR  Wound culture/abscess: No results for input(s): WNDABS in the last 72 hours.   Tip culture:No results for input(s): CXCATHTIP in the last 72 hours. Antibiotic  Days  Day started   cephalexin  1 dose     ceftriaxone  1 dose       vancomycin  1 dose        Oxygen: Additional Respiratory  Assessments  Pulse: 92  Resp: 20  SpO2: 96 %         Lines:  Site  Day  Date inserted     TLC              PICC              Arterial line              Peripheral line  left wrist, left antecubital           HD cath                 Imaging Studies:    CXR 4/12/19  No active process and no interim change    Resident's Assessment and Plan     <Cardiovascular>  HTN  - Held home lisinopril for now  - Resume once out of DKA and BP tolerates    <Endocrine>  Recurrent DKA  - Unclear precipitating event, possibly 2/2 marijuana   - Normal PEx and CXR normal, got 1 dose of cephalexin, CTX, and vanco in the ED, no indication for Abx  - Bridging with pump once AG close x2  - Repeat BMP after 2 hours bridged     Poorly controlled T1DM on insulin pump  - HbA1c 9.0  - Insulin pump with rate of basal insulin 1.2u/hr day and 1.8u/hr night, Consider to increase basal insulin rate     <Genitourinary/Renal>  Hyponatremia, likely hypovolemic hypotonic-resolved    <Hematology/Oncology>  Leukocytosis, possibly 2/2 reactive and hemoconcentration from vol depletion   - No need further abx  - Monitor CBC daily       <Dermatologic/Musculoskeletal>  Chronic acial cyst s/p incision  - Noted removal of cheezy like cyst material  - No signs of infection    # Peptic ulcer prophylaxis: protonix  # DVT Prophylaxis: :Lovenox      Cady Donya MARTINEZ.DEACON, PGY-2  Internal medicine resident  Attending Physician: Dr. Alex Oneal    I personally saw, examined and provided care for the patient. Radiographs, labs and medication list were reviewed by me independently. I spoke with bedside nursing, therapists and consultants. Critical care services and times documented are independent of procedures and multidisciplinary rounds with Residents.  Additionally comprehensive, multidisciplinary rounds were conducted with the MICU team. The case was discussed in detail and plans for care were established. Review of Residents documentation was conducted and revisions were made as appropriate. I agree with the above documented exam, problem list and plan of care. dka per protocol   Denia Monson M.D.    Pulmonary/Critical Care Medicine   35 mincct excluding procedures coarse breath sounds

## 2025-04-17 NOTE — ED PROVIDER NOTE - NS ED ATTENDING STATEMENT MOD
Nephrology Progress Note  10/9/2021 4:27 PM        Subjective:   Admit Date: 10/7/2021  PCP: Bravo Zavala DO    Interval History: Patient seen earlier today, this is a late entry    Diet: Better oral intake    ROS: She looked much better, alert awake and oriented  I had a long conversation with  Also had almost 1200 cc of urine for the last 24 hours    Data:     Current meds:    warfarin (COUMADIN) daily dosing (placeholder)   Other RX Placeholder    furosemide  20 mg IntraVENous BID    albuterol sulfate HFA  2 puff Inhalation BID    aspirin  81 mg Oral Daily    atorvastatin  10 mg Oral Nightly    carvedilol  6.25 mg Oral BID WC    levothyroxine  88 mcg Oral Daily    ocuvite-lutein  1 tablet Oral Daily    potassium chloride  10 mEq Oral Daily    vitamin B-12  1,000 mcg Oral Daily    sodium chloride flush  5-40 mL IntraVENous 2 times per day    tiotropium-olodaterol  2 puff Inhalation Daily    timolol  1 drop Both Eyes BID      sodium chloride           I/O last 3 completed shifts: In: 120 [P.O.:120]  Out: 1200 [Urine:1200]    CBC:   Recent Labs     10/07/21  1012 10/08/21  0237 10/09/21  0445   WBC 13.2* 16.7* 14.8*   HGB 13.5 12.9 13.4    443* 441*          Recent Labs     10/07/21  1012 10/07/21  2140 10/08/21  2215 10/09/21  0445 10/09/21  1031   *   < > 121* 121* 121*   K 4.8  --   --  4.4  --    CL 87*  --   --  89*  --    CO2 21  --   --  21  --    BUN 19  --   --  34*  --    CREATININE 0.8  --   --  0.8  --    GLUCOSE 132*  --   --  103*  --     < > = values in this interval not displayed.        Lab Results   Component Value Date    CALCIUM 9.5 10/09/2021    PHOS 2.9 10/09/2021       Objective:     Vitals: BP (!) 127/96   Pulse 65   Temp 97.2 °F (36.2 °C) (Oral)   Resp 16   Ht 5' (1.524 m)   Wt 135 lb (61.2 kg)   SpO2 96%   BMI 26.37 kg/m²     General appearance: Alert awake and oriented no confusion-she has audible wheeze-was coughing during my conversation  HEENT: No gross conjunctival pallor  Neck: Supple  Lungs: Coarse crackles bilaterally  Heart: Irregular irregular-well-healed incision from previous sternotomy-left chest wall cardiac device  Abdomen: Soft nontender  Extremities: No overt edema  Is Carnes catheter    Problem List :         Impression : 1. Acute on chronic hyponatremia-hypervolemia was suspected-given auscultation findings, chest x-ray finding and high proBNP level-would make sense given her underlying cardiac disease-she is on loop therapy-sodium increase but rather slowly  2. Probable acute decompensated heart failure with underlying coronary artery disease    3. Rash-little interesting that is predominantly on the right side of her body-raising suspicion for contact dermatitis-or secondary to medication-although one  would expect bilateral rash    Recommendation/Plan  :     1. I would intensify the diuretics to 20 mg IV 3 times daily  2. If she makes good urine-able to get free water out-sodium is expected to come up  3. Otherwise follow clinically and biochemically  4.  Mentally she is much better-I am less concern      Cyrus Gomez MD MD This was a shared visit with the SERGE. I reviewed and verified the documentation.

## 2025-04-17 NOTE — ED PROVIDER NOTE - ATTENDING APP SHARED VISIT CONTRIBUTION OF CARE
75M PMH TBI , Afib on Eliquis, BPH, CAD, recurrent Aspiration PNA, Peripheral Neuropathy, and Depression p/w Octavio Bains MD:  patient seen and evaluated with the PA.  I was present for key portions of the History & Physical, and I agree with the Impression & Plan.     75M PMH TBI , Afib on Eliquis, BPH, CAD, recurrent Aspiration PNA, Peripheral Neuropathy, and Depression p/w cough and congestion for past few days. Pt was recently placed on keflex by PCP for a presumed cellulitis. Wife notes that pt has been having productive sputum as well    Gen: NAD, non-toxic appearing, awake alert   HEENT: normal conjunctiva, oral mucosa moist, no stridor  Lung: CTAB, no respiratory distress, no wheezes/rhonchi/rales B/L, speaking in full sentences  CV: RRR  Abd: soft, NT, ND, no guarding, no rigidity, no rebound tenderness  MSK: contracted extremities  Skin: Warm, well perfused, chronic skin lesions    DDx includes but not limited to: pna vs viral uri vs strep vs lyte derangements vs bin  Plan: labs, ct chest, reassess symptoms    See Progress Notes for further updates on ED Course

## 2025-04-17 NOTE — ED PROVIDER NOTE - CLINICAL SUMMARY MEDICAL DECISION MAKING FREE TEXT BOX
75M PMH TBI , Afib on Eliquis, BPH, CAD, recurrent Aspiration PNA, Peripheral Neuropathy, and Depression p/w cough and congestion for past few days. Pt was recently placed on keflex by PCP for a presumed cellulitis. Wife notes that pt has been having productive sputum as well    Gen: NAD, non-toxic appearing, awake alert   HEENT: normal conjunctiva, oral mucosa moist, no stridor  Lung: CTAB, no respiratory distress, no wheezes/rhonchi/rales B/L, speaking in full sentences  CV: RRR  Abd: soft, NT, ND, no guarding, no rigidity, no rebound tenderness  MSK: contracted extremities  Skin: Warm, well perfused, chronic skin lesions    DDx includes but not limited to: pna vs viral uri vs strep vs lyte derangements vs bin  Plan: labs, ct chest, reassess symptoms    See Progress Notes for further updates on ED Course

## 2025-04-17 NOTE — ED PROVIDER NOTE - OBJECTIVE STATEMENT
75-year-old male history of A-fib, BPH, CAD, brain hemorrhage, TBI, HLD, HTN, depression, neuropathy, aspiration pneumonia, BIBEMS from home accompanied by wife due to cough and congestion.  Patient had routine evaluation at PMD office few days ago.  At that time wife was concerned about one of his skin lesions which seemed like it was getting infected.  Was started on Keflex with improvement.  She also mentioned that she noticed he was having a mild cough/clearing his throat and started giving him a few nebulizer treatments.  Noticed he has been having increased chest congestion and coughing with sputum.  Concerned due to his history of aspiration pneumonia for which he was last admitted about 1 year ago.  No reported fever.  No recent vomiting.

## 2025-04-17 NOTE — ED ADULT TRIAGE NOTE - TEMPERATURE IN FAHRENHEIT (DEGREES F)
NEPHROLOGY PROGRESS NOTE    Date of service: 12/28/23     CC: ESKD      ASSESSMENT/RECOMMENDATIONS:  1. ESKD: on HD MWF at [facility]. EDW=56.5kg. Access: LFA AVF. Her outpt HD unit was notified of her admit and orders reviewed.               -Continue HD MWF and prn during hospitalization.     2. N/V: improved. treat DKA and other management per primary     3. Hypertension: Her BP is above goal. Continue current BP meds. UF with HD.control pain and nausea.      4. DM1: DKA on admit. Management per primary.     5. Anemia: due to ESKD. Hemoglobin is at goal(9-11). Acute drop with illness, procedure. No need for SHARLENE now. Trend.      6. BMM: holding sevelamer given n/v. Trend. Phos.     7. Facial edema/SVC syndrome: large clot on tip of RIJ HD catheter found 12/28 and this was removed by IR on 12/28. Venogram 12/29 showed mild-mod stensosi of AVF-s/p angioplasty and irregular adherent SVC thormbus, but patient SVC. On heparin per IR, primary.     8. Bacteremia: GPC in clusters from 12/27 BC. Renally dose abx per primary. Seen by ID. Discussed with Dr. Koch. HD cath now removed. Trend.       Orville Alexander MD  [facility]   Office: [phone]      S: She last had HD yesterday with net UF=1.5L.  Had venogram earlier today.  Head swellinig better after HD cath removed. No cp, sob, edema today. She tells me she feels much better.    Current Facility-Administered Medications   Medication    acetaminophen (TYLENOL) tablet 650 mg    calcium carbonate (TUMS) chewable tablet 500 mg    glucose gel 15-30 g    Or    dextrose 50 % injection 25-50 mL    Or    glucagon injection 1 mg    famotidine (PEPCID) tablet 20 mg    fentaNYL (PF) (SUBLIMAZE) injection 25-50 mcg    flumazenil (ROMAZICON) injection 0.2 mg    heparin infusion 25,000 units in D5W 250 mL ANTICOAGULANT    hydrALAZINE (APRESOLINE) injection 10 mg    HYDROmorphone (DILAUDID) tablet 2 mg    insulin aspart (NovoLOG) injection (RAPID  "ACTING)    insulin glargine (LANTUS PEN) injection 16 Units    losartan (COZAAR) tablet 25 mg    midazolam (VERSED) injection 0.5-2 mg    naloxone (NARCAN) injection 0.2 mg    Or    naloxone (NARCAN) injection 0.4 mg    Or    naloxone (NARCAN) injection 0.2 mg    Or    naloxone (NARCAN) injection 0.4 mg    naloxone (NARCAN) injection 0.2 mg    Or    naloxone (NARCAN) injection 0.4 mg    Or    naloxone (NARCAN) injection 0.2 mg    Or    naloxone (NARCAN) injection 0.4 mg    No heparin via hemodialysis machine    ondansetron (ZOFRAN) injection 4 mg    ondansetron (ZOFRAN) injection 4 mg    prochlorperazine (COMPAZINE) injection 5 mg    promethazine (PHENERGAN) 6.25 mg in sodium chloride 0.9 % 55 mL intermittent infusion    rOPINIRole (REQUIP) tablet 1 mg    senna-docusate (SENOKOT-S/PERICOLACE) 8.6-50 MG per tablet 1 tablet    sertraline (ZOLOFT) tablet 150 mg    [Held by provider] sevelamer carbonate (RENVELA) tablet 800 mg    sodium chloride 0.9% BOLUS 100-150 mL    sodium chloride 0.9% BOLUS 100-150 mL    sodium chloride 0.9% BOLUS 250 mL    traZODone (DESYREL) tablet 100 mg    vancomycin place love - receiving intermittent dosing        No interval change in SH, FH.    Physical Exam  /57   Pulse 103   Temp 98  F (36.7  C) (Oral)   Resp 16   Ht 1.702 m (5' 7\")   Wt 56.2 kg (124 lb)   LMP 12/13/2023   SpO2 97%   BMI 19.42 kg/m    GENERAL: Calm in bed  HEENT: NCAT, sclerae not icteric, MMM, some facial edema, but improved  NECK: Trachea midline.   LUNGS: CTA, no respiratory distress,  HEART: RR, tachy rate, no rub, no leg edema.   ABDOMEN: Soft, NT  PSYCH: Alert, normal affect  NEURO:  ANGELES  ACCESS: SHAUN AVF with thrill        Lab Data:  Recent Labs   Lab Test 12/30/23  0602 12/29/23  2330 12/29/23  1904 12/29/23  0745   POTASSIUM 3.9 3.7 4.0 4.3   CHLORIDE 96* 95* 95* 97*   ALBUMIN  --   --   --  4.0     Recent Labs   Lab Test 12/30/23  0602 12/29/23  2330 12/29/23  1904 12/29/23  0745   BUN 20.9* " "21.2* 21.0* 19.8     Recent Labs   Lab Test 01/02/24  0428 01/01/24  0531 12/30/23  0602 12/29/23  1637 11/24/22  0528 11/23/22  0538   WBC  --  9.5 6.1 9.2   < >  --    HGB  --  9.3* 9.0* 9.4*   < >  --    MCV  --  92 95 97   < >  --     183 150 178   < >  --    IRON  --   --   --   --   --  35*   IRONSAT  --   --   --   --   --  22    < > = values in this interval not displayed.     No lab results found.    Invalid input(s): \"PTHINTACT\", \"[id number]\", \"[id number]\", \"PROTCREATUR\"    I reviewed the above labs  " 98.4

## 2025-04-17 NOTE — ED ADULT NURSE NOTE - NSFALLHARMRISKINTERV_ED_ALL_ED

## 2025-04-17 NOTE — ED ADULT NURSE NOTE - ED STAT RN HANDOFF DETAILS
D/c instructions reviewed w/ pt wife prior to her leaving ED. VS stable, IV removed. Pt transported by [facility] to home in stable condition.

## 2025-05-06 ENCOUNTER — RX RENEWAL (OUTPATIENT)
Age: 75
End: 2025-05-06

## 2025-05-06 RX ORDER — HYDRALAZINE HYDROCHLORIDE 25 MG/1
25 TABLET ORAL
Qty: 270 | Refills: 1 | Status: ACTIVE | COMMUNITY
Start: 2025-05-06

## 2025-05-14 NOTE — ED ADULT TRIAGE NOTE - ACCOMPANIED BY
Rx Refill Note  Requested Prescriptions     Pending Prescriptions Disp Refills    anastrozole (ARIMIDEX) 1 MG tablet 4 tablet 5     Sig: Take 1 tablet by mouth 1 (One) Time Per Week.      Last office visit with prescribing clinician: 8/1/2023   Last telemedicine visit with prescribing clinician: 2/18/2025   Next office visit with prescribing clinician: Visit date not found                         Would you like a call back once the refill request has been completed: [] Yes [] No    If the office needs to give you a call back, can they leave a voicemail: [] Yes [] No    Sophie Escalera MA  05/14/25, 08:00 EDT  
EMT/paramedic

## 2025-05-19 NOTE — PROGRESS NOTE ADULT - SUBJECTIVE AND OBJECTIVE BOX
May 19, 2025     Patient: Gutierrez Skelton   YOB: 2008   Date of Visit: 5/19/2025       To Whom it May Concern:    Gutierrez Skelton was seen in my clinic on 5/19/2025 at 8:20 am.     Please excuse Gutierrez for his absence from school on the date listed above to be able to make his appointment. He may return to school 5/20/25.     Sincerely,         Gume Lucia MD    Medical information is confidential and cannot be disclosed without the written consent of the patient or his representative.       SUMMARY: 72M BPH CAD, HTN, HLD, Afib on eliquis hx IPH 1yr ago, presented to [facility] w/vom this AM, LAST dose of eliquis this AM. CTH showed L IPH with parafalcine SDH.  Plt 216 PTT 42.8 INR 1.35    HOSPITAL COURSE: No significant events since patient's transferred [facility]. CTH repeat was stable.    24 HOUR EVENTS: As per HPI    ADMISSION SCORES:   GCS: 14    REVIEW OF SYSTEMS:  REVIEW OF SYSTEMS: [] Unable to Assess due to neurologic exam   [x ] All ROS addressed below are non-contributory, except:  Neuro: [ ] Headache [ ] Back pain [ ] Numbness [ ] Weakness [ ] Ataxia [ ] Dizziness [ ] Aphasia [ ] Dysarthria [ ] Visual disturbance  Resp: [ ] Shortness of breath/dyspnea [ ] Orthopnea [ ] Cough  CV: [ ] Chest pain [ ] Palpitation [ ] Lightheadedness [ ] Syncope  Renal: [ ] Thirst [ ] Edema  GI: [ ] Nausea [ ] Emesis [ ] Abdominal pain [ ] Constipation [ ] Diarrhea  Hem: [ ] Hematemesis [ ] bBright red blood per rectum  ID: [ ] Fever [ ] Chills [ ] Dysuria  ENT: [ ] Rhinorrhea    ALLERGIES: Allergies    No Known Allergies    Intolerances        VITALS/DATA/ORDERS: [x] Reviewed    DEVICES:   [] Restraints [x] PIVs [] ET tube [] central line [] PICC [] arterial line [] mcmahon [] NGT/OGT [] EVD [] LD [] ANNEL/HMV [] LiCOX [] ICP monitor [] Trach [] PEG [] Chest Tube [] other:    EXAMINATION:  General: No acute distress  HEENT: Anicteric sclerae  Cardiac: [id number]  Lungs: Clear  Abdomen: Soft, non-tender, +BS  Extremities: No c/c/e  Skin/Incision Site: Clean, dry and intact  Neurologic: Awake, alert, oriented x2 (does not know the year), follows commands, PERRL, VFFtc, EOMI, L facial asymmetry, tongue midline, RUE contracted at the elbow but able to move it antigravity, RLE trace movements. LUE at least 4/5, LLE 1/5. Right left apraxia.

## 2025-05-23 ENCOUNTER — NON-APPOINTMENT (OUTPATIENT)
Age: 75
End: 2025-05-23

## 2025-08-22 DIAGNOSIS — E11.9 TYPE 2 DIABETES MELLITUS W/OUT COMPLICATIONS: ICD-10-CM

## 2025-08-24 ENCOUNTER — INPATIENT (INPATIENT)
Facility: HOSPITAL | Age: 75
LOS: 4 days | Discharge: HOME | DRG: 641 | End: 2025-08-29
Attending: INTERNAL MEDICINE | Admitting: STUDENT IN AN ORGANIZED HEALTH CARE EDUCATION/TRAINING PROGRAM
Payer: COMMERCIAL

## 2025-08-24 VITALS
TEMPERATURE: 99 F | DIASTOLIC BLOOD PRESSURE: 75 MMHG | SYSTOLIC BLOOD PRESSURE: 111 MMHG | HEART RATE: 93 BPM | WEIGHT: 130.07 LBS | OXYGEN SATURATION: 94 % | RESPIRATION RATE: 18 BRPM

## 2025-08-24 DIAGNOSIS — R62.7 ADULT FAILURE TO THRIVE: ICD-10-CM

## 2025-08-24 DIAGNOSIS — S06.9XAA UNSPECIFIED INTRACRANIAL INJURY WITH LOSS OF CONSCIOUSNESS STATUS UNKNOWN, INITIAL ENCOUNTER: ICD-10-CM

## 2025-08-24 DIAGNOSIS — I48.91 UNSPECIFIED ATRIAL FIBRILLATION: ICD-10-CM

## 2025-08-24 DIAGNOSIS — R21 RASH AND OTHER NONSPECIFIC SKIN ERUPTION: ICD-10-CM

## 2025-08-24 DIAGNOSIS — L89.159 PRESSURE ULCER OF SACRAL REGION, UNSPECIFIED STAGE: ICD-10-CM

## 2025-08-24 DIAGNOSIS — E87.0 HYPEROSMOLALITY AND HYPERNATREMIA: ICD-10-CM

## 2025-08-24 DIAGNOSIS — Z29.9 ENCOUNTER FOR PROPHYLACTIC MEASURES, UNSPECIFIED: ICD-10-CM

## 2025-08-24 DIAGNOSIS — I25.10 ATHEROSCLEROTIC HEART DISEASE OF NATIVE CORONARY ARTERY WITHOUT ANGINA PECTORIS: ICD-10-CM

## 2025-08-24 DIAGNOSIS — N17.9 ACUTE KIDNEY FAILURE, UNSPECIFIED: ICD-10-CM

## 2025-08-24 DIAGNOSIS — A41.9 SEPSIS, UNSPECIFIED ORGANISM: ICD-10-CM

## 2025-08-24 DIAGNOSIS — Z98.890 OTHER SPECIFIED POSTPROCEDURAL STATES: Chronic | ICD-10-CM

## 2025-08-24 DIAGNOSIS — I10 ESSENTIAL (PRIMARY) HYPERTENSION: ICD-10-CM

## 2025-08-24 DIAGNOSIS — E11.9 TYPE 2 DIABETES MELLITUS WITHOUT COMPLICATIONS: ICD-10-CM

## 2025-08-24 DIAGNOSIS — F32.9 MAJOR DEPRESSIVE DISORDER, SINGLE EPISODE, UNSPECIFIED: ICD-10-CM

## 2025-08-24 DIAGNOSIS — D72.829 ELEVATED WHITE BLOOD CELL COUNT, UNSPECIFIED: ICD-10-CM

## 2025-08-24 DIAGNOSIS — R05.9 COUGH, UNSPECIFIED: ICD-10-CM

## 2025-08-24 LAB
ALBUMIN SERPL ELPH-MCNC: 3.4 G/DL — SIGNIFICANT CHANGE UP (ref 3.3–5)
ALP SERPL-CCNC: 110 U/L — SIGNIFICANT CHANGE UP (ref 40–120)
ALT FLD-CCNC: 16 U/L — SIGNIFICANT CHANGE UP (ref 12–78)
ANION GAP SERPL CALC-SCNC: 5 MMOL/L — SIGNIFICANT CHANGE UP (ref 5–17)
APPEARANCE UR: CLEAR — SIGNIFICANT CHANGE UP
APTT BLD: 41.6 SEC — HIGH (ref 26.1–36.8)
AST SERPL-CCNC: 11 U/L — LOW (ref 15–37)
BACTERIA # UR AUTO: ABNORMAL /HPF
BASOPHILS # BLD AUTO: 0.09 K/UL — SIGNIFICANT CHANGE UP (ref 0–0.2)
BASOPHILS NFR BLD AUTO: 0.7 % — SIGNIFICANT CHANGE UP (ref 0–2)
BILIRUB SERPL-MCNC: 0.6 MG/DL — SIGNIFICANT CHANGE UP (ref 0.2–1.2)
BILIRUB UR-MCNC: NEGATIVE — SIGNIFICANT CHANGE UP
BUN SERPL-MCNC: 20 MG/DL — SIGNIFICANT CHANGE UP (ref 7–23)
CALCIUM SERPL-MCNC: 9.5 MG/DL — SIGNIFICANT CHANGE UP (ref 8.5–10.1)
CHLORIDE SERPL-SCNC: 120 MMOL/L — HIGH (ref 96–108)
CO2 SERPL-SCNC: 26 MMOL/L — SIGNIFICANT CHANGE UP (ref 22–31)
COLOR SPEC: YELLOW — SIGNIFICANT CHANGE UP
COMMENT - URINE: SIGNIFICANT CHANGE UP
CREAT SERPL-MCNC: 1.4 MG/DL — HIGH (ref 0.5–1.3)
DIFF PNL FLD: NEGATIVE — SIGNIFICANT CHANGE UP
EGFR: 53 ML/MIN/1.73M2 — LOW
EGFR: 53 ML/MIN/1.73M2 — LOW
EOSINOPHIL # BLD AUTO: 0.27 K/UL — SIGNIFICANT CHANGE UP (ref 0–0.5)
EOSINOPHIL NFR BLD AUTO: 2 % — SIGNIFICANT CHANGE UP (ref 0–6)
EPI CELLS # UR: PRESENT
FLUAV AG NPH QL: SIGNIFICANT CHANGE UP
FLUBV AG NPH QL: SIGNIFICANT CHANGE UP
GLUCOSE SERPL-MCNC: 150 MG/DL — HIGH (ref 70–99)
GLUCOSE UR QL: NEGATIVE MG/DL — SIGNIFICANT CHANGE UP
HCT VFR BLD CALC: 43.2 % — SIGNIFICANT CHANGE UP (ref 39–50)
HGB BLD-MCNC: 13.1 G/DL — SIGNIFICANT CHANGE UP (ref 13–17)
IMM GRANULOCYTES # BLD AUTO: 0.08 K/UL — HIGH (ref 0–0.07)
IMM GRANULOCYTES NFR BLD AUTO: 0.6 % — SIGNIFICANT CHANGE UP (ref 0–0.9)
INR BLD: 1.63 RATIO — HIGH (ref 0.85–1.16)
KETONES UR QL: NEGATIVE MG/DL — SIGNIFICANT CHANGE UP
LACTATE SERPL-SCNC: 0.9 MMOL/L — SIGNIFICANT CHANGE UP (ref 0.7–2)
LEUKOCYTE ESTERASE UR-ACNC: NEGATIVE — SIGNIFICANT CHANGE UP
LYMPHOCYTES # BLD AUTO: 2.05 K/UL — SIGNIFICANT CHANGE UP (ref 1–3.3)
LYMPHOCYTES NFR BLD AUTO: 15.1 % — SIGNIFICANT CHANGE UP (ref 13–44)
MCHC RBC-ENTMCNC: 21.2 PG — LOW (ref 27–34)
MCHC RBC-ENTMCNC: 30.3 G/DL — LOW (ref 32–36)
MCV RBC AUTO: 70 FL — LOW (ref 80–100)
MONOCYTES # BLD AUTO: 0.89 K/UL — SIGNIFICANT CHANGE UP (ref 0–0.9)
MONOCYTES NFR BLD AUTO: 6.6 % — SIGNIFICANT CHANGE UP (ref 2–14)
NEUTROPHILS # BLD AUTO: 10.2 K/UL — HIGH (ref 1.8–7.4)
NEUTROPHILS NFR BLD AUTO: 75 % — SIGNIFICANT CHANGE UP (ref 43–77)
NITRITE UR-MCNC: NEGATIVE — SIGNIFICANT CHANGE UP
NRBC # BLD AUTO: 0 K/UL — SIGNIFICANT CHANGE UP (ref 0–0)
NRBC # FLD: 0 K/UL — SIGNIFICANT CHANGE UP (ref 0–0)
PH UR: 6 — SIGNIFICANT CHANGE UP (ref 5–8)
PLATELET # BLD AUTO: 345 K/UL — SIGNIFICANT CHANGE UP (ref 150–400)
PMV BLD: SIGNIFICANT CHANGE UP FL (ref 7–13)
POTASSIUM SERPL-MCNC: 3.8 MMOL/L — SIGNIFICANT CHANGE UP (ref 3.5–5.3)
POTASSIUM SERPL-SCNC: 3.8 MMOL/L — SIGNIFICANT CHANGE UP (ref 3.5–5.3)
PROT SERPL-MCNC: 7.3 G/DL — SIGNIFICANT CHANGE UP (ref 6–8.3)
PROT UR-MCNC: 30 MG/DL
PROTHROM AB SERPL-ACNC: 18.8 SEC — HIGH (ref 9.9–13.4)
RBC # BLD: 6.17 M/UL — HIGH (ref 4.2–5.8)
RBC # FLD: 21 % — HIGH (ref 10.3–14.5)
RBC CASTS # UR COMP ASSIST: 3 /HPF — SIGNIFICANT CHANGE UP (ref 0–4)
RSV RNA NPH QL NAA+NON-PROBE: SIGNIFICANT CHANGE UP
SARS-COV-2 RNA SPEC QL NAA+PROBE: SIGNIFICANT CHANGE UP
SODIUM SERPL-SCNC: 151 MMOL/L — HIGH (ref 135–145)
SOURCE RESPIRATORY: SIGNIFICANT CHANGE UP
SP GR SPEC: 1.02 — SIGNIFICANT CHANGE UP (ref 1–1.03)
UROBILINOGEN FLD QL: 0.2 MG/DL — SIGNIFICANT CHANGE UP (ref 0.2–1)
WBC # BLD: 13.58 K/UL — HIGH (ref 3.8–10.5)
WBC # FLD AUTO: 13.58 K/UL — HIGH (ref 3.8–10.5)
WBC UR QL: 5 /HPF — SIGNIFICANT CHANGE UP (ref 0–5)

## 2025-08-24 PROCEDURE — 83605 ASSAY OF LACTIC ACID: CPT

## 2025-08-24 PROCEDURE — 71250 CT THORAX DX C-: CPT

## 2025-08-24 PROCEDURE — 87637 SARSCOV2&INF A&B&RSV AMP PRB: CPT

## 2025-08-24 PROCEDURE — 80053 COMPREHEN METABOLIC PANEL: CPT

## 2025-08-24 PROCEDURE — 99223 1ST HOSP IP/OBS HIGH 75: CPT | Mod: GC

## 2025-08-24 PROCEDURE — 71045 X-RAY EXAM CHEST 1 VIEW: CPT | Mod: 26

## 2025-08-24 PROCEDURE — 81001 URINALYSIS AUTO W/SCOPE: CPT

## 2025-08-24 PROCEDURE — 71045 X-RAY EXAM CHEST 1 VIEW: CPT

## 2025-08-24 PROCEDURE — 85025 COMPLETE CBC W/AUTO DIFF WBC: CPT

## 2025-08-24 PROCEDURE — 85730 THROMBOPLASTIN TIME PARTIAL: CPT

## 2025-08-24 PROCEDURE — 99285 EMERGENCY DEPT VISIT HI MDM: CPT

## 2025-08-24 PROCEDURE — 36415 COLL VENOUS BLD VENIPUNCTURE: CPT

## 2025-08-24 PROCEDURE — 71250 CT THORAX DX C-: CPT | Mod: 26

## 2025-08-24 PROCEDURE — 87040 BLOOD CULTURE FOR BACTERIA: CPT

## 2025-08-24 PROCEDURE — 85610 PROTHROMBIN TIME: CPT

## 2025-08-24 PROCEDURE — 93005 ELECTROCARDIOGRAM TRACING: CPT

## 2025-08-24 RX ORDER — B1/B2/B3/B5/B6/B12/VIT C/FOLIC 500-0.5 MG
1 TABLET ORAL DAILY
Refills: 0 | Status: DISCONTINUED | OUTPATIENT
Start: 2025-08-24 | End: 2025-08-29

## 2025-08-24 RX ORDER — PIPERACILLIN-TAZO-DEXTROSE,ISO 3.375G/5
3.38 IV SOLUTION, PIGGYBACK PREMIX FROZEN(ML) INTRAVENOUS ONCE
Refills: 0 | Status: COMPLETED | OUTPATIENT
Start: 2025-08-24 | End: 2025-08-24

## 2025-08-24 RX ORDER — DEXTROSE 50 % IN WATER 50 %
15 SYRINGE (ML) INTRAVENOUS ONCE
Refills: 0 | Status: DISCONTINUED | OUTPATIENT
Start: 2025-08-24 | End: 2025-08-27

## 2025-08-24 RX ORDER — ALBUTEROL SULFATE 2.5 MG/3ML
2.5 VIAL, NEBULIZER (ML) INHALATION ONCE
Refills: 0 | Status: DISCONTINUED | OUTPATIENT
Start: 2025-08-24 | End: 2025-08-24

## 2025-08-24 RX ORDER — GLUCAGON 3 MG/1
1 POWDER NASAL ONCE
Refills: 0 | Status: DISCONTINUED | OUTPATIENT
Start: 2025-08-24 | End: 2025-08-29

## 2025-08-24 RX ORDER — SODIUM CHLORIDE 9 G/1000ML
1000 INJECTION, SOLUTION INTRAVENOUS
Refills: 0 | Status: DISCONTINUED | OUTPATIENT
Start: 2025-08-24 | End: 2025-08-27

## 2025-08-24 RX ORDER — B1/B2/B3/B5/B6/B12/VIT C/FOLIC 500-0.5 MG
1 TABLET ORAL DAILY
Refills: 0 | Status: DISCONTINUED | OUTPATIENT
Start: 2025-08-24 | End: 2025-08-24

## 2025-08-24 RX ORDER — SODIUM CHLORIDE 9 G/1000ML
1000 INJECTION, SOLUTION INTRAVENOUS
Refills: 0 | Status: DISCONTINUED | OUTPATIENT
Start: 2025-08-24 | End: 2025-08-24

## 2025-08-24 RX ORDER — MODAFINIL 200 MG/1
100 TABLET ORAL
Refills: 0 | Status: DISCONTINUED | OUTPATIENT
Start: 2025-08-24 | End: 2025-08-29

## 2025-08-24 RX ORDER — MELATONIN 5 MG
3 TABLET ORAL AT BEDTIME
Refills: 0 | Status: DISCONTINUED | OUTPATIENT
Start: 2025-08-24 | End: 2025-08-29

## 2025-08-24 RX ORDER — DEXTROSE 50 % IN WATER 50 %
25 SYRINGE (ML) INTRAVENOUS ONCE
Refills: 0 | Status: DISCONTINUED | OUTPATIENT
Start: 2025-08-24 | End: 2025-08-27

## 2025-08-24 RX ORDER — LATANOPROST PF 0.05 MG/ML
1 SOLUTION/ DROPS OPHTHALMIC AT BEDTIME
Refills: 0 | Status: DISCONTINUED | OUTPATIENT
Start: 2025-08-24 | End: 2025-08-29

## 2025-08-24 RX ORDER — PIPERACILLIN-TAZO-DEXTROSE,ISO 3.375G/5
3.38 IV SOLUTION, PIGGYBACK PREMIX FROZEN(ML) INTRAVENOUS ONCE
Refills: 0 | Status: COMPLETED | OUTPATIENT
Start: 2025-08-25 | End: 2025-08-25

## 2025-08-24 RX ORDER — DEXTROSE 50 % IN WATER 50 %
12.5 SYRINGE (ML) INTRAVENOUS ONCE
Refills: 0 | Status: DISCONTINUED | OUTPATIENT
Start: 2025-08-24 | End: 2025-08-27

## 2025-08-24 RX ORDER — GABAPENTIN 400 MG/1
400 CAPSULE ORAL
Refills: 0 | Status: DISCONTINUED | OUTPATIENT
Start: 2025-08-24 | End: 2025-08-29

## 2025-08-24 RX ORDER — DILTIAZEM HYDROCHLORIDE 240 MG/1
240 TABLET, EXTENDED RELEASE ORAL DAILY
Refills: 0 | Status: DISCONTINUED | OUTPATIENT
Start: 2025-08-24 | End: 2025-08-27

## 2025-08-24 RX ORDER — MIRTAZAPINE 30 MG/1
7.5 TABLET, FILM COATED ORAL DAILY
Refills: 0 | Status: DISCONTINUED | OUTPATIENT
Start: 2025-08-24 | End: 2025-08-29

## 2025-08-24 RX ORDER — SENNA 187 MG
1 TABLET ORAL DAILY
Refills: 0 | Status: DISCONTINUED | OUTPATIENT
Start: 2025-08-24 | End: 2025-08-29

## 2025-08-24 RX ORDER — MAGNESIUM, ALUMINUM HYDROXIDE 200-200 MG
30 TABLET,CHEWABLE ORAL EVERY 4 HOURS
Refills: 0 | Status: DISCONTINUED | OUTPATIENT
Start: 2025-08-24 | End: 2025-08-27

## 2025-08-24 RX ORDER — CEFTRIAXONE 500 MG/1
1000 INJECTION, POWDER, FOR SOLUTION INTRAMUSCULAR; INTRAVENOUS ONCE
Refills: 0 | Status: COMPLETED | OUTPATIENT
Start: 2025-08-24 | End: 2025-08-24

## 2025-08-24 RX ORDER — ACETAMINOPHEN 500 MG/5ML
650 LIQUID (ML) ORAL EVERY 6 HOURS
Refills: 0 | Status: DISCONTINUED | OUTPATIENT
Start: 2025-08-24 | End: 2025-08-29

## 2025-08-24 RX ORDER — APIXABAN 2.5 MG/1
2.5 TABLET, FILM COATED ORAL
Refills: 0 | Status: DISCONTINUED | OUTPATIENT
Start: 2025-08-24 | End: 2025-08-29

## 2025-08-24 RX ORDER — PIPERACILLIN-TAZO-DEXTROSE,ISO 3.375G/5
3.38 IV SOLUTION, PIGGYBACK PREMIX FROZEN(ML) INTRAVENOUS EVERY 8 HOURS
Refills: 0 | Status: DISCONTINUED | OUTPATIENT
Start: 2025-08-25 | End: 2025-08-29

## 2025-08-24 RX ORDER — BRIMONIDINE TARTRATE 1.5 MG/ML
1 SOLUTION/ DROPS OPHTHALMIC THREE TIMES A DAY
Refills: 0 | Status: DISCONTINUED | OUTPATIENT
Start: 2025-08-24 | End: 2025-08-29

## 2025-08-24 RX ORDER — INSULIN LISPRO 100 U/ML
INJECTION, SOLUTION INTRAVENOUS; SUBCUTANEOUS EVERY 6 HOURS
Refills: 0 | Status: DISCONTINUED | OUTPATIENT
Start: 2025-08-24 | End: 2025-08-27

## 2025-08-24 RX ORDER — ALBUTEROL SULFATE 2.5 MG/3ML
2.5 VIAL, NEBULIZER (ML) INHALATION EVERY 4 HOURS
Refills: 0 | Status: DISCONTINUED | OUTPATIENT
Start: 2025-08-24 | End: 2025-08-29

## 2025-08-24 RX ADMIN — Medication 1850 MILLILITER(S): at 10:46

## 2025-08-24 RX ADMIN — APIXABAN 2.5 MILLIGRAM(S): 2.5 TABLET, FILM COATED ORAL at 18:33

## 2025-08-24 RX ADMIN — SODIUM CHLORIDE 50 MILLILITER(S): 9 INJECTION, SOLUTION INTRAVENOUS at 18:25

## 2025-08-24 RX ADMIN — LATANOPROST PF 1 DROP(S): 0.05 SOLUTION/ DROPS OPHTHALMIC at 21:36

## 2025-08-24 RX ADMIN — Medication 200 GRAM(S): at 18:28

## 2025-08-24 RX ADMIN — Medication 25 GRAM(S): at 23:58

## 2025-08-24 RX ADMIN — BRIMONIDINE TARTRATE 1 DROP(S): 1.5 SOLUTION/ DROPS OPHTHALMIC at 21:38

## 2025-08-24 RX ADMIN — CEFTRIAXONE 1000 MILLIGRAM(S): 500 INJECTION, POWDER, FOR SOLUTION INTRAMUSCULAR; INTRAVENOUS at 11:15

## 2025-08-24 RX ADMIN — Medication 1850 MILLILITER(S): at 12:00

## 2025-08-24 RX ADMIN — GABAPENTIN 400 MILLIGRAM(S): 400 CAPSULE ORAL at 18:33

## 2025-08-24 RX ADMIN — CEFTRIAXONE 100 MILLIGRAM(S): 500 INJECTION, POWDER, FOR SOLUTION INTRAMUSCULAR; INTRAVENOUS at 10:53

## 2025-08-24 RX ADMIN — SODIUM CHLORIDE 50 MILLILITER(S): 9 INJECTION, SOLUTION INTRAVENOUS at 21:36

## 2025-08-25 ENCOUNTER — RX RENEWAL (OUTPATIENT)
Age: 75
End: 2025-08-25

## 2025-08-25 ENCOUNTER — LABORATORY RESULT (OUTPATIENT)
Age: 75
End: 2025-08-25

## 2025-08-25 ENCOUNTER — TRANSCRIPTION ENCOUNTER (OUTPATIENT)
Age: 75
End: 2025-08-25

## 2025-08-25 LAB
ALBUMIN SERPL ELPH-MCNC: 2.6 G/DL — LOW (ref 3.3–5)
ALP SERPL-CCNC: 91 U/L — SIGNIFICANT CHANGE UP (ref 40–120)
ALT FLD-CCNC: 15 U/L — SIGNIFICANT CHANGE UP (ref 12–78)
ANION GAP SERPL CALC-SCNC: 5 MMOL/L — SIGNIFICANT CHANGE UP (ref 5–17)
AST SERPL-CCNC: 10 U/L — LOW (ref 15–37)
BASOPHILS # BLD AUTO: 0.06 K/UL — SIGNIFICANT CHANGE UP (ref 0–0.2)
BASOPHILS NFR BLD AUTO: 0.5 % — SIGNIFICANT CHANGE UP (ref 0–2)
BILIRUB SERPL-MCNC: 0.3 MG/DL — SIGNIFICANT CHANGE UP (ref 0.2–1.2)
BUN SERPL-MCNC: 16 MG/DL — SIGNIFICANT CHANGE UP (ref 7–23)
CALCIUM SERPL-MCNC: 8.5 MG/DL — SIGNIFICANT CHANGE UP (ref 8.5–10.1)
CHLORIDE SERPL-SCNC: 120 MMOL/L — HIGH (ref 96–108)
CO2 SERPL-SCNC: 25 MMOL/L — SIGNIFICANT CHANGE UP (ref 22–31)
CREAT SERPL-MCNC: 1.2 MG/DL — SIGNIFICANT CHANGE UP (ref 0.5–1.3)
CULTURE RESULTS: NO GROWTH — SIGNIFICANT CHANGE UP
EGFR: 64 ML/MIN/1.73M2 — SIGNIFICANT CHANGE UP
EGFR: 64 ML/MIN/1.73M2 — SIGNIFICANT CHANGE UP
EOSINOPHIL # BLD AUTO: 0.23 K/UL — SIGNIFICANT CHANGE UP (ref 0–0.5)
EOSINOPHIL NFR BLD AUTO: 2.1 % — SIGNIFICANT CHANGE UP (ref 0–6)
GLUCOSE BLDC GLUCOMTR-MCNC: 138 MG/DL — HIGH (ref 70–99)
GLUCOSE BLDC GLUCOMTR-MCNC: 158 MG/DL — HIGH (ref 70–99)
GLUCOSE BLDC GLUCOMTR-MCNC: 164 MG/DL — HIGH (ref 70–99)
GLUCOSE BLDC GLUCOMTR-MCNC: 165 MG/DL — HIGH (ref 70–99)
GLUCOSE BLDC GLUCOMTR-MCNC: 177 MG/DL — HIGH (ref 70–99)
GLUCOSE SERPL-MCNC: 146 MG/DL — HIGH (ref 70–99)
HCT VFR BLD CALC: 34.3 % — LOW (ref 39–50)
HGB BLD-MCNC: 10.3 G/DL — LOW (ref 13–17)
IMM GRANULOCYTES # BLD AUTO: 0.06 K/UL — SIGNIFICANT CHANGE UP (ref 0–0.07)
IMM GRANULOCYTES NFR BLD AUTO: 0.5 % — SIGNIFICANT CHANGE UP (ref 0–0.9)
LYMPHOCYTES # BLD AUTO: 1.48 K/UL — SIGNIFICANT CHANGE UP (ref 1–3.3)
LYMPHOCYTES NFR BLD AUTO: 13.3 % — SIGNIFICANT CHANGE UP (ref 13–44)
MAGNESIUM SERPL-MCNC: 2 MG/DL — SIGNIFICANT CHANGE UP (ref 1.6–2.6)
MCHC RBC-ENTMCNC: 21 PG — LOW (ref 27–34)
MCHC RBC-ENTMCNC: 30 G/DL — LOW (ref 32–36)
MCV RBC AUTO: 70 FL — LOW (ref 80–100)
MONOCYTES # BLD AUTO: 0.69 K/UL — SIGNIFICANT CHANGE UP (ref 0–0.9)
MONOCYTES NFR BLD AUTO: 6.2 % — SIGNIFICANT CHANGE UP (ref 2–14)
NEUTROPHILS # BLD AUTO: 8.64 K/UL — HIGH (ref 1.8–7.4)
NEUTROPHILS NFR BLD AUTO: 77.4 % — HIGH (ref 43–77)
NRBC # BLD AUTO: 0 K/UL — SIGNIFICANT CHANGE UP (ref 0–0)
NRBC # FLD: 0 K/UL — SIGNIFICANT CHANGE UP (ref 0–0)
NRBC BLD AUTO-RTO: 0 /100 WBCS — SIGNIFICANT CHANGE UP (ref 0–0)
PHOSPHATE SERPL-MCNC: 2.7 MG/DL — SIGNIFICANT CHANGE UP (ref 2.5–4.5)
PLATELET # BLD AUTO: 283 K/UL — SIGNIFICANT CHANGE UP (ref 150–400)
PMV BLD: 11.4 FL — SIGNIFICANT CHANGE UP (ref 7–13)
POTASSIUM SERPL-MCNC: 3.5 MMOL/L — SIGNIFICANT CHANGE UP (ref 3.5–5.3)
POTASSIUM SERPL-SCNC: 3.5 MMOL/L — SIGNIFICANT CHANGE UP (ref 3.5–5.3)
PROT SERPL-MCNC: 5.6 G/DL — LOW (ref 6–8.3)
RBC # BLD: 4.9 M/UL — SIGNIFICANT CHANGE UP (ref 4.2–5.8)
RBC # FLD: 19.8 % — HIGH (ref 10.3–14.5)
SODIUM SERPL-SCNC: 150 MMOL/L — HIGH (ref 135–145)
SPECIMEN SOURCE: SIGNIFICANT CHANGE UP
WBC # BLD: 11.16 K/UL — HIGH (ref 3.8–10.5)
WBC # FLD AUTO: 11.16 K/UL — HIGH (ref 3.8–10.5)

## 2025-08-25 PROCEDURE — 85610 PROTHROMBIN TIME: CPT

## 2025-08-25 PROCEDURE — 85730 THROMBOPLASTIN TIME PARTIAL: CPT

## 2025-08-25 PROCEDURE — 81001 URINALYSIS AUTO W/SCOPE: CPT

## 2025-08-25 PROCEDURE — 87086 URINE CULTURE/COLONY COUNT: CPT

## 2025-08-25 PROCEDURE — 87637 SARSCOV2&INF A&B&RSV AMP PRB: CPT

## 2025-08-25 PROCEDURE — 83036 HEMOGLOBIN GLYCOSYLATED A1C: CPT

## 2025-08-25 PROCEDURE — 93005 ELECTROCARDIOGRAM TRACING: CPT

## 2025-08-25 PROCEDURE — 36415 COLL VENOUS BLD VENIPUNCTURE: CPT

## 2025-08-25 PROCEDURE — 80053 COMPREHEN METABOLIC PANEL: CPT

## 2025-08-25 PROCEDURE — 85025 COMPLETE CBC W/AUTO DIFF WBC: CPT

## 2025-08-25 PROCEDURE — 87040 BLOOD CULTURE FOR BACTERIA: CPT

## 2025-08-25 PROCEDURE — 84100 ASSAY OF PHOSPHORUS: CPT

## 2025-08-25 PROCEDURE — 83605 ASSAY OF LACTIC ACID: CPT

## 2025-08-25 PROCEDURE — 83735 ASSAY OF MAGNESIUM: CPT

## 2025-08-25 PROCEDURE — 99233 SBSQ HOSP IP/OBS HIGH 50: CPT

## 2025-08-25 PROCEDURE — 71250 CT THORAX DX C-: CPT

## 2025-08-25 PROCEDURE — 82962 GLUCOSE BLOOD TEST: CPT

## 2025-08-25 PROCEDURE — 71045 X-RAY EXAM CHEST 1 VIEW: CPT

## 2025-08-25 PROCEDURE — 99222 1ST HOSP IP/OBS MODERATE 55: CPT | Mod: GC

## 2025-08-25 RX ORDER — SODIUM CHLORIDE 9 G/1000ML
1000 INJECTION, SOLUTION INTRAVENOUS
Refills: 0 | Status: DISCONTINUED | OUTPATIENT
Start: 2025-08-25 | End: 2025-08-27

## 2025-08-25 RX ADMIN — MIRTAZAPINE 7.5 MILLIGRAM(S): 30 TABLET, FILM COATED ORAL at 14:08

## 2025-08-25 RX ADMIN — Medication 50 MILLIGRAM(S): at 14:09

## 2025-08-25 RX ADMIN — BRIMONIDINE TARTRATE 1 DROP(S): 1.5 SOLUTION/ DROPS OPHTHALMIC at 06:15

## 2025-08-25 RX ADMIN — SODIUM CHLORIDE 50 MILLILITER(S): 9 INJECTION, SOLUTION INTRAVENOUS at 06:13

## 2025-08-25 RX ADMIN — INSULIN LISPRO 1: 100 INJECTION, SOLUTION INTRAVENOUS; SUBCUTANEOUS at 06:38

## 2025-08-25 RX ADMIN — BRIMONIDINE TARTRATE 1 DROP(S): 1.5 SOLUTION/ DROPS OPHTHALMIC at 22:05

## 2025-08-25 RX ADMIN — Medication 25 GRAM(S): at 06:12

## 2025-08-25 RX ADMIN — INSULIN LISPRO 1: 100 INJECTION, SOLUTION INTRAVENOUS; SUBCUTANEOUS at 17:41

## 2025-08-25 RX ADMIN — APIXABAN 2.5 MILLIGRAM(S): 2.5 TABLET, FILM COATED ORAL at 18:43

## 2025-08-25 RX ADMIN — MODAFINIL 100 MILLIGRAM(S): 200 TABLET ORAL at 18:47

## 2025-08-25 RX ADMIN — SODIUM CHLORIDE 50 MILLILITER(S): 9 INJECTION, SOLUTION INTRAVENOUS at 06:22

## 2025-08-25 RX ADMIN — Medication 25 GRAM(S): at 22:05

## 2025-08-25 RX ADMIN — Medication 50 MILLIGRAM(S): at 22:05

## 2025-08-25 RX ADMIN — SODIUM CHLORIDE 50 MILLILITER(S): 9 INJECTION, SOLUTION INTRAVENOUS at 14:10

## 2025-08-25 RX ADMIN — Medication 1000 UNIT(S): at 14:09

## 2025-08-25 RX ADMIN — SODIUM CHLORIDE 100 MILLILITER(S): 9 INJECTION, SOLUTION INTRAVENOUS at 18:42

## 2025-08-25 RX ADMIN — Medication 1 TABLET(S): at 14:08

## 2025-08-25 RX ADMIN — GABAPENTIN 400 MILLIGRAM(S): 400 CAPSULE ORAL at 18:43

## 2025-08-25 RX ADMIN — Medication 25 GRAM(S): at 14:09

## 2025-08-25 RX ADMIN — LATANOPROST PF 1 DROP(S): 0.05 SOLUTION/ DROPS OPHTHALMIC at 22:05

## 2025-08-25 RX ADMIN — BRIMONIDINE TARTRATE 1 DROP(S): 1.5 SOLUTION/ DROPS OPHTHALMIC at 14:08

## 2025-08-26 LAB
A1C WITH ESTIMATED AVERAGE GLUCOSE RESULT: 6 % — HIGH (ref 4–5.6)
ANION GAP SERPL CALC-SCNC: 6 MMOL/L — SIGNIFICANT CHANGE UP (ref 5–17)
BASOPHILS # BLD AUTO: 0.06 K/UL — SIGNIFICANT CHANGE UP (ref 0–0.2)
BASOPHILS NFR BLD AUTO: 0.6 % — SIGNIFICANT CHANGE UP (ref 0–2)
BUN SERPL-MCNC: 11 MG/DL — SIGNIFICANT CHANGE UP (ref 7–23)
CALCIUM SERPL-MCNC: 8 MG/DL — LOW (ref 8.5–10.1)
CHLORIDE SERPL-SCNC: 110 MMOL/L — HIGH (ref 96–108)
CO2 SERPL-SCNC: 26 MMOL/L — SIGNIFICANT CHANGE UP (ref 22–31)
CREAT SERPL-MCNC: 1.2 MG/DL — SIGNIFICANT CHANGE UP (ref 0.5–1.3)
EGFR: 64 ML/MIN/1.73M2 — SIGNIFICANT CHANGE UP
EGFR: 64 ML/MIN/1.73M2 — SIGNIFICANT CHANGE UP
EOSINOPHIL # BLD AUTO: 0.26 K/UL — SIGNIFICANT CHANGE UP (ref 0–0.5)
EOSINOPHIL NFR BLD AUTO: 2.5 % — SIGNIFICANT CHANGE UP (ref 0–6)
ESTIMATED AVERAGE GLUCOSE: 126 MG/DL — HIGH (ref 68–114)
GLUCOSE BLDC GLUCOMTR-MCNC: 105 MG/DL — HIGH (ref 70–99)
GLUCOSE BLDC GLUCOMTR-MCNC: 125 MG/DL — HIGH (ref 70–99)
GLUCOSE BLDC GLUCOMTR-MCNC: 174 MG/DL — HIGH (ref 70–99)
GLUCOSE BLDC GLUCOMTR-MCNC: 181 MG/DL — HIGH (ref 70–99)
GLUCOSE SERPL-MCNC: 142 MG/DL — HIGH (ref 70–99)
HCT VFR BLD CALC: 33.2 % — LOW (ref 39–50)
HGB BLD-MCNC: 10.1 G/DL — LOW (ref 13–17)
IMM GRANULOCYTES # BLD AUTO: 0.09 K/UL — HIGH (ref 0–0.07)
IMM GRANULOCYTES NFR BLD AUTO: 0.9 % — SIGNIFICANT CHANGE UP (ref 0–0.9)
LYMPHOCYTES # BLD AUTO: 1.5 K/UL — SIGNIFICANT CHANGE UP (ref 1–3.3)
LYMPHOCYTES NFR BLD AUTO: 14.5 % — SIGNIFICANT CHANGE UP (ref 13–44)
MAGNESIUM SERPL-MCNC: 1.7 MG/DL — SIGNIFICANT CHANGE UP (ref 1.6–2.6)
MCHC RBC-ENTMCNC: 21 PG — LOW (ref 27–34)
MCHC RBC-ENTMCNC: 30.4 G/DL — LOW (ref 32–36)
MCV RBC AUTO: 69 FL — LOW (ref 80–100)
MONOCYTES # BLD AUTO: 0.77 K/UL — SIGNIFICANT CHANGE UP (ref 0–0.9)
MONOCYTES NFR BLD AUTO: 7.5 % — SIGNIFICANT CHANGE UP (ref 2–14)
NEUTROPHILS # BLD AUTO: 7.63 K/UL — HIGH (ref 1.8–7.4)
NEUTROPHILS NFR BLD AUTO: 74 % — SIGNIFICANT CHANGE UP (ref 43–77)
NRBC # BLD AUTO: 0 K/UL — SIGNIFICANT CHANGE UP (ref 0–0)
NRBC # FLD: 0 K/UL — SIGNIFICANT CHANGE UP (ref 0–0)
NRBC BLD AUTO-RTO: 0 /100 WBCS — SIGNIFICANT CHANGE UP (ref 0–0)
PLATELET # BLD AUTO: 291 K/UL — SIGNIFICANT CHANGE UP (ref 150–400)
PMV BLD: 11.5 FL — SIGNIFICANT CHANGE UP (ref 7–13)
POTASSIUM SERPL-MCNC: 3 MMOL/L — LOW (ref 3.5–5.3)
POTASSIUM SERPL-SCNC: 3 MMOL/L — LOW (ref 3.5–5.3)
RBC # BLD: 4.81 M/UL — SIGNIFICANT CHANGE UP (ref 4.2–5.8)
RBC # FLD: 19.4 % — HIGH (ref 10.3–14.5)
SODIUM SERPL-SCNC: 142 MMOL/L — SIGNIFICANT CHANGE UP (ref 135–145)
WBC # BLD: 10.31 K/UL — SIGNIFICANT CHANGE UP (ref 3.8–10.5)
WBC # FLD AUTO: 10.31 K/UL — SIGNIFICANT CHANGE UP (ref 3.8–10.5)

## 2025-08-26 PROCEDURE — 99232 SBSQ HOSP IP/OBS MODERATE 35: CPT

## 2025-08-26 PROCEDURE — 99233 SBSQ HOSP IP/OBS HIGH 50: CPT

## 2025-08-26 RX ADMIN — Medication 1 TABLET(S): at 11:06

## 2025-08-26 RX ADMIN — INSULIN LISPRO 1: 100 INJECTION, SOLUTION INTRAVENOUS; SUBCUTANEOUS at 00:09

## 2025-08-26 RX ADMIN — Medication 100 MILLIEQUIVALENT(S): at 11:52

## 2025-08-26 RX ADMIN — Medication 1000 UNIT(S): at 11:06

## 2025-08-26 RX ADMIN — Medication 25 GRAM(S): at 21:19

## 2025-08-26 RX ADMIN — SODIUM CHLORIDE 100 MILLILITER(S): 9 INJECTION, SOLUTION INTRAVENOUS at 02:13

## 2025-08-26 RX ADMIN — Medication 100 MILLIEQUIVALENT(S): at 12:50

## 2025-08-26 RX ADMIN — Medication 40 MILLIEQUIVALENT(S): at 11:06

## 2025-08-26 RX ADMIN — GABAPENTIN 400 MILLIGRAM(S): 400 CAPSULE ORAL at 05:07

## 2025-08-26 RX ADMIN — Medication 50 MILLIGRAM(S): at 05:07

## 2025-08-26 RX ADMIN — INSULIN LISPRO 1: 100 INJECTION, SOLUTION INTRAVENOUS; SUBCUTANEOUS at 05:33

## 2025-08-26 RX ADMIN — APIXABAN 2.5 MILLIGRAM(S): 2.5 TABLET, FILM COATED ORAL at 05:07

## 2025-08-26 RX ADMIN — MODAFINIL 100 MILLIGRAM(S): 200 TABLET ORAL at 05:07

## 2025-08-26 RX ADMIN — BRIMONIDINE TARTRATE 1 DROP(S): 1.5 SOLUTION/ DROPS OPHTHALMIC at 13:30

## 2025-08-26 RX ADMIN — Medication 10 MILLIGRAM(S): at 20:01

## 2025-08-26 RX ADMIN — BRIMONIDINE TARTRATE 1 DROP(S): 1.5 SOLUTION/ DROPS OPHTHALMIC at 05:07

## 2025-08-26 RX ADMIN — BRIMONIDINE TARTRATE 1 DROP(S): 1.5 SOLUTION/ DROPS OPHTHALMIC at 21:19

## 2025-08-26 RX ADMIN — Medication 25 GRAM(S): at 13:30

## 2025-08-26 RX ADMIN — LATANOPROST PF 1 DROP(S): 0.05 SOLUTION/ DROPS OPHTHALMIC at 21:20

## 2025-08-26 RX ADMIN — Medication 25 GRAM(S): at 05:06

## 2025-08-26 RX ADMIN — Medication 100 MILLIEQUIVALENT(S): at 10:52

## 2025-08-26 RX ADMIN — DILTIAZEM HYDROCHLORIDE 240 MILLIGRAM(S): 240 TABLET, EXTENDED RELEASE ORAL at 05:07

## 2025-08-26 RX ADMIN — MIRTAZAPINE 7.5 MILLIGRAM(S): 30 TABLET, FILM COATED ORAL at 11:06

## 2025-08-27 DIAGNOSIS — Z71.89 OTHER SPECIFIED COUNSELING: ICD-10-CM

## 2025-08-27 DIAGNOSIS — Z51.5 ENCOUNTER FOR PALLIATIVE CARE: ICD-10-CM

## 2025-08-27 LAB
ANION GAP SERPL CALC-SCNC: 7 MMOL/L — SIGNIFICANT CHANGE UP (ref 5–17)
BASOPHILS # BLD AUTO: 0.06 K/UL — SIGNIFICANT CHANGE UP (ref 0–0.2)
BASOPHILS NFR BLD AUTO: 0.5 % — SIGNIFICANT CHANGE UP (ref 0–2)
BUN SERPL-MCNC: 9 MG/DL — SIGNIFICANT CHANGE UP (ref 7–23)
CALCIUM SERPL-MCNC: 8.3 MG/DL — LOW (ref 8.5–10.1)
CHLORIDE SERPL-SCNC: 111 MMOL/L — HIGH (ref 96–108)
CO2 SERPL-SCNC: 24 MMOL/L — SIGNIFICANT CHANGE UP (ref 22–31)
CREAT SERPL-MCNC: 1.2 MG/DL — SIGNIFICANT CHANGE UP (ref 0.5–1.3)
EGFR: 64 ML/MIN/1.73M2 — SIGNIFICANT CHANGE UP
EGFR: 64 ML/MIN/1.73M2 — SIGNIFICANT CHANGE UP
EOSINOPHIL # BLD AUTO: 0.24 K/UL — SIGNIFICANT CHANGE UP (ref 0–0.5)
EOSINOPHIL NFR BLD AUTO: 2 % — SIGNIFICANT CHANGE UP (ref 0–6)
GLUCOSE BLDC GLUCOMTR-MCNC: 170 MG/DL — HIGH (ref 70–99)
GLUCOSE BLDC GLUCOMTR-MCNC: 183 MG/DL — HIGH (ref 70–99)
GLUCOSE SERPL-MCNC: 171 MG/DL — HIGH (ref 70–99)
HCT VFR BLD CALC: 34.7 % — LOW (ref 39–50)
HGB BLD-MCNC: 10.7 G/DL — LOW (ref 13–17)
IMM GRANULOCYTES # BLD AUTO: 0.11 K/UL — HIGH (ref 0–0.07)
IMM GRANULOCYTES NFR BLD AUTO: 0.9 % — SIGNIFICANT CHANGE UP (ref 0–0.9)
LYMPHOCYTES # BLD AUTO: 1.1 K/UL — SIGNIFICANT CHANGE UP (ref 1–3.3)
LYMPHOCYTES NFR BLD AUTO: 9.1 % — LOW (ref 13–44)
MAGNESIUM SERPL-MCNC: 1.8 MG/DL — SIGNIFICANT CHANGE UP (ref 1.6–2.6)
MCHC RBC-ENTMCNC: 21 PG — LOW (ref 27–34)
MCHC RBC-ENTMCNC: 30.8 G/DL — LOW (ref 32–36)
MCV RBC AUTO: 68.2 FL — LOW (ref 80–100)
MONOCYTES # BLD AUTO: 0.7 K/UL — SIGNIFICANT CHANGE UP (ref 0–0.9)
MONOCYTES NFR BLD AUTO: 5.8 % — SIGNIFICANT CHANGE UP (ref 2–14)
NEUTROPHILS # BLD AUTO: 9.87 K/UL — HIGH (ref 1.8–7.4)
NEUTROPHILS NFR BLD AUTO: 81.7 % — HIGH (ref 43–77)
NRBC # BLD AUTO: 0 K/UL — SIGNIFICANT CHANGE UP (ref 0–0)
NRBC # FLD: 0 K/UL — SIGNIFICANT CHANGE UP (ref 0–0)
NRBC BLD AUTO-RTO: 0 /100 WBCS — SIGNIFICANT CHANGE UP (ref 0–0)
PHOSPHATE SERPL-MCNC: 2.7 MG/DL — SIGNIFICANT CHANGE UP (ref 2.5–4.5)
PLATELET # BLD AUTO: 296 K/UL — SIGNIFICANT CHANGE UP (ref 150–400)
PMV BLD: 11.5 FL — SIGNIFICANT CHANGE UP (ref 7–13)
POTASSIUM SERPL-MCNC: 3.5 MMOL/L — SIGNIFICANT CHANGE UP (ref 3.5–5.3)
POTASSIUM SERPL-SCNC: 3.5 MMOL/L — SIGNIFICANT CHANGE UP (ref 3.5–5.3)
RBC # BLD: 5.09 M/UL — SIGNIFICANT CHANGE UP (ref 4.2–5.8)
RBC # FLD: 20 % — HIGH (ref 10.3–14.5)
SODIUM SERPL-SCNC: 142 MMOL/L — SIGNIFICANT CHANGE UP (ref 135–145)
WBC # BLD: 12.08 K/UL — HIGH (ref 3.8–10.5)
WBC # FLD AUTO: 12.08 K/UL — HIGH (ref 3.8–10.5)

## 2025-08-27 PROCEDURE — 84100 ASSAY OF PHOSPHORUS: CPT

## 2025-08-27 PROCEDURE — 81001 URINALYSIS AUTO W/SCOPE: CPT

## 2025-08-27 PROCEDURE — 80053 COMPREHEN METABOLIC PANEL: CPT

## 2025-08-27 PROCEDURE — 99497 ADVNCD CARE PLAN 30 MIN: CPT | Mod: 25

## 2025-08-27 PROCEDURE — 83605 ASSAY OF LACTIC ACID: CPT

## 2025-08-27 PROCEDURE — 82962 GLUCOSE BLOOD TEST: CPT

## 2025-08-27 PROCEDURE — 85730 THROMBOPLASTIN TIME PARTIAL: CPT

## 2025-08-27 PROCEDURE — 99223 1ST HOSP IP/OBS HIGH 75: CPT

## 2025-08-27 PROCEDURE — 87086 URINE CULTURE/COLONY COUNT: CPT

## 2025-08-27 PROCEDURE — 80048 BASIC METABOLIC PNL TOTAL CA: CPT

## 2025-08-27 PROCEDURE — 87637 SARSCOV2&INF A&B&RSV AMP PRB: CPT

## 2025-08-27 PROCEDURE — 71045 X-RAY EXAM CHEST 1 VIEW: CPT

## 2025-08-27 PROCEDURE — 71250 CT THORAX DX C-: CPT

## 2025-08-27 PROCEDURE — 85025 COMPLETE CBC W/AUTO DIFF WBC: CPT

## 2025-08-27 PROCEDURE — 83735 ASSAY OF MAGNESIUM: CPT

## 2025-08-27 PROCEDURE — 36415 COLL VENOUS BLD VENIPUNCTURE: CPT

## 2025-08-27 PROCEDURE — 85610 PROTHROMBIN TIME: CPT

## 2025-08-27 PROCEDURE — 93005 ELECTROCARDIOGRAM TRACING: CPT

## 2025-08-27 PROCEDURE — 99232 SBSQ HOSP IP/OBS MODERATE 35: CPT

## 2025-08-27 PROCEDURE — 99233 SBSQ HOSP IP/OBS HIGH 50: CPT

## 2025-08-27 PROCEDURE — 87040 BLOOD CULTURE FOR BACTERIA: CPT

## 2025-08-27 PROCEDURE — 83036 HEMOGLOBIN GLYCOSYLATED A1C: CPT

## 2025-08-27 RX ORDER — DILTIAZEM HYDROCHLORIDE 240 MG/1
60 TABLET, EXTENDED RELEASE ORAL EVERY 6 HOURS
Refills: 0 | Status: DISCONTINUED | OUTPATIENT
Start: 2025-08-27 | End: 2025-08-29

## 2025-08-27 RX ORDER — SODIUM CHLORIDE 9 G/1000ML
1000 INJECTION, SOLUTION INTRAVENOUS
Refills: 0 | Status: DISCONTINUED | OUTPATIENT
Start: 2025-08-27 | End: 2025-08-28

## 2025-08-27 RX ADMIN — MODAFINIL 100 MILLIGRAM(S): 200 TABLET ORAL at 05:35

## 2025-08-27 RX ADMIN — MODAFINIL 100 MILLIGRAM(S): 200 TABLET ORAL at 17:14

## 2025-08-27 RX ADMIN — APIXABAN 2.5 MILLIGRAM(S): 2.5 TABLET, FILM COATED ORAL at 17:07

## 2025-08-27 RX ADMIN — Medication 25 GRAM(S): at 05:36

## 2025-08-27 RX ADMIN — INSULIN LISPRO 1: 100 INJECTION, SOLUTION INTRAVENOUS; SUBCUTANEOUS at 06:02

## 2025-08-27 RX ADMIN — Medication 40 MILLIGRAM(S): at 05:36

## 2025-08-27 RX ADMIN — BRIMONIDINE TARTRATE 1 DROP(S): 1.5 SOLUTION/ DROPS OPHTHALMIC at 21:38

## 2025-08-27 RX ADMIN — DILTIAZEM HYDROCHLORIDE 60 MILLIGRAM(S): 240 TABLET, EXTENDED RELEASE ORAL at 11:22

## 2025-08-27 RX ADMIN — MIRTAZAPINE 7.5 MILLIGRAM(S): 30 TABLET, FILM COATED ORAL at 11:22

## 2025-08-27 RX ADMIN — Medication 1 TABLET(S): at 11:22

## 2025-08-27 RX ADMIN — GABAPENTIN 400 MILLIGRAM(S): 400 CAPSULE ORAL at 05:35

## 2025-08-27 RX ADMIN — Medication 25 GRAM(S): at 21:38

## 2025-08-27 RX ADMIN — APIXABAN 2.5 MILLIGRAM(S): 2.5 TABLET, FILM COATED ORAL at 05:35

## 2025-08-27 RX ADMIN — Medication 25 GRAM(S): at 13:16

## 2025-08-27 RX ADMIN — DILTIAZEM HYDROCHLORIDE 60 MILLIGRAM(S): 240 TABLET, EXTENDED RELEASE ORAL at 17:10

## 2025-08-27 RX ADMIN — Medication 50 MILLIGRAM(S): at 21:38

## 2025-08-27 RX ADMIN — INSULIN LISPRO 1: 100 INJECTION, SOLUTION INTRAVENOUS; SUBCUTANEOUS at 00:19

## 2025-08-27 RX ADMIN — BRIMONIDINE TARTRATE 1 DROP(S): 1.5 SOLUTION/ DROPS OPHTHALMIC at 13:16

## 2025-08-27 RX ADMIN — DILTIAZEM HYDROCHLORIDE 240 MILLIGRAM(S): 240 TABLET, EXTENDED RELEASE ORAL at 05:35

## 2025-08-27 RX ADMIN — Medication 50 MILLIGRAM(S): at 05:36

## 2025-08-27 RX ADMIN — SODIUM CHLORIDE 80 MILLILITER(S): 9 INJECTION, SOLUTION INTRAVENOUS at 11:22

## 2025-08-27 RX ADMIN — Medication 1000 UNIT(S): at 11:22

## 2025-08-27 RX ADMIN — LATANOPROST PF 1 DROP(S): 0.05 SOLUTION/ DROPS OPHTHALMIC at 21:38

## 2025-08-27 RX ADMIN — BRIMONIDINE TARTRATE 1 DROP(S): 1.5 SOLUTION/ DROPS OPHTHALMIC at 05:36

## 2025-08-27 RX ADMIN — GABAPENTIN 400 MILLIGRAM(S): 400 CAPSULE ORAL at 17:11

## 2025-08-28 LAB
ANION GAP SERPL CALC-SCNC: 8 MMOL/L — SIGNIFICANT CHANGE UP (ref 5–17)
BUN SERPL-MCNC: 7 MG/DL — SIGNIFICANT CHANGE UP (ref 7–23)
CALCIUM SERPL-MCNC: 8.7 MG/DL — SIGNIFICANT CHANGE UP (ref 8.5–10.1)
CHLORIDE SERPL-SCNC: 111 MMOL/L — HIGH (ref 96–108)
CO2 SERPL-SCNC: 26 MMOL/L — SIGNIFICANT CHANGE UP (ref 22–31)
CREAT SERPL-MCNC: 1.2 MG/DL — SIGNIFICANT CHANGE UP (ref 0.5–1.3)
EGFR: 64 ML/MIN/1.73M2 — SIGNIFICANT CHANGE UP
EGFR: 64 ML/MIN/1.73M2 — SIGNIFICANT CHANGE UP
GLUCOSE SERPL-MCNC: 175 MG/DL — HIGH (ref 70–99)
HCT VFR BLD CALC: 37.2 % — LOW (ref 39–50)
HGB BLD-MCNC: 11.5 G/DL — LOW (ref 13–17)
MAGNESIUM SERPL-MCNC: 1.7 MG/DL — SIGNIFICANT CHANGE UP (ref 1.6–2.6)
MCHC RBC-ENTMCNC: 21.2 PG — LOW (ref 27–34)
MCHC RBC-ENTMCNC: 30.9 G/DL — LOW (ref 32–36)
MCV RBC AUTO: 68.5 FL — LOW (ref 80–100)
NRBC # BLD AUTO: 0 K/UL — SIGNIFICANT CHANGE UP (ref 0–0)
NRBC # FLD: 0 K/UL — SIGNIFICANT CHANGE UP (ref 0–0)
NRBC BLD AUTO-RTO: 0 /100 WBCS — SIGNIFICANT CHANGE UP (ref 0–0)
PHOSPHATE SERPL-MCNC: 2.6 MG/DL — SIGNIFICANT CHANGE UP (ref 2.5–4.5)
PLATELET # BLD AUTO: 313 K/UL — SIGNIFICANT CHANGE UP (ref 150–400)
PMV BLD: 11.5 FL — SIGNIFICANT CHANGE UP (ref 7–13)
POTASSIUM SERPL-MCNC: 3.3 MMOL/L — LOW (ref 3.5–5.3)
POTASSIUM SERPL-SCNC: 3.3 MMOL/L — LOW (ref 3.5–5.3)
RBC # BLD: 5.43 M/UL — SIGNIFICANT CHANGE UP (ref 4.2–5.8)
RBC # FLD: 20.4 % — HIGH (ref 10.3–14.5)
SODIUM SERPL-SCNC: 145 MMOL/L — SIGNIFICANT CHANGE UP (ref 135–145)
WBC # BLD: 14.01 K/UL — HIGH (ref 3.8–10.5)
WBC # FLD AUTO: 14.01 K/UL — HIGH (ref 3.8–10.5)

## 2025-08-28 PROCEDURE — 85027 COMPLETE CBC AUTOMATED: CPT

## 2025-08-28 PROCEDURE — 99233 SBSQ HOSP IP/OBS HIGH 50: CPT

## 2025-08-28 PROCEDURE — 85610 PROTHROMBIN TIME: CPT

## 2025-08-28 PROCEDURE — 87086 URINE CULTURE/COLONY COUNT: CPT

## 2025-08-28 PROCEDURE — 83605 ASSAY OF LACTIC ACID: CPT

## 2025-08-28 PROCEDURE — 81001 URINALYSIS AUTO W/SCOPE: CPT

## 2025-08-28 PROCEDURE — 36415 COLL VENOUS BLD VENIPUNCTURE: CPT

## 2025-08-28 PROCEDURE — 80053 COMPREHEN METABOLIC PANEL: CPT

## 2025-08-28 PROCEDURE — 83036 HEMOGLOBIN GLYCOSYLATED A1C: CPT

## 2025-08-28 PROCEDURE — 99232 SBSQ HOSP IP/OBS MODERATE 35: CPT

## 2025-08-28 PROCEDURE — 71250 CT THORAX DX C-: CPT

## 2025-08-28 PROCEDURE — 85025 COMPLETE CBC W/AUTO DIFF WBC: CPT

## 2025-08-28 PROCEDURE — 80048 BASIC METABOLIC PNL TOTAL CA: CPT

## 2025-08-28 PROCEDURE — 84100 ASSAY OF PHOSPHORUS: CPT

## 2025-08-28 PROCEDURE — 87040 BLOOD CULTURE FOR BACTERIA: CPT

## 2025-08-28 PROCEDURE — 87637 SARSCOV2&INF A&B&RSV AMP PRB: CPT

## 2025-08-28 PROCEDURE — 71045 X-RAY EXAM CHEST 1 VIEW: CPT

## 2025-08-28 PROCEDURE — 93005 ELECTROCARDIOGRAM TRACING: CPT

## 2025-08-28 PROCEDURE — 82962 GLUCOSE BLOOD TEST: CPT

## 2025-08-28 PROCEDURE — 85730 THROMBOPLASTIN TIME PARTIAL: CPT

## 2025-08-28 PROCEDURE — 83735 ASSAY OF MAGNESIUM: CPT

## 2025-08-28 RX ORDER — POTASSIUM CHLORIDE, DEXTROSE MONOHYDRATE AND SODIUM CHLORIDE 150; 5; 900 MG/100ML; G/100ML; MG/100ML
1000 INJECTION, SOLUTION INTRAVENOUS
Refills: 0 | Status: DISCONTINUED | OUTPATIENT
Start: 2025-08-28 | End: 2025-08-29

## 2025-08-28 RX ADMIN — MODAFINIL 100 MILLIGRAM(S): 200 TABLET ORAL at 17:03

## 2025-08-28 RX ADMIN — BRIMONIDINE TARTRATE 1 DROP(S): 1.5 SOLUTION/ DROPS OPHTHALMIC at 22:23

## 2025-08-28 RX ADMIN — BRIMONIDINE TARTRATE 1 DROP(S): 1.5 SOLUTION/ DROPS OPHTHALMIC at 05:44

## 2025-08-28 RX ADMIN — Medication 50 MILLIGRAM(S): at 22:22

## 2025-08-28 RX ADMIN — GABAPENTIN 400 MILLIGRAM(S): 400 CAPSULE ORAL at 05:43

## 2025-08-28 RX ADMIN — DILTIAZEM HYDROCHLORIDE 60 MILLIGRAM(S): 240 TABLET, EXTENDED RELEASE ORAL at 05:43

## 2025-08-28 RX ADMIN — POTASSIUM CHLORIDE, DEXTROSE MONOHYDRATE AND SODIUM CHLORIDE 65 MILLILITER(S): 150; 5; 900 INJECTION, SOLUTION INTRAVENOUS at 17:02

## 2025-08-28 RX ADMIN — MODAFINIL 100 MILLIGRAM(S): 200 TABLET ORAL at 05:43

## 2025-08-28 RX ADMIN — LATANOPROST PF 1 DROP(S): 0.05 SOLUTION/ DROPS OPHTHALMIC at 22:22

## 2025-08-28 RX ADMIN — Medication 1000 UNIT(S): at 11:53

## 2025-08-28 RX ADMIN — BRIMONIDINE TARTRATE 1 DROP(S): 1.5 SOLUTION/ DROPS OPHTHALMIC at 13:49

## 2025-08-28 RX ADMIN — Medication 25 GRAM(S): at 05:43

## 2025-08-28 RX ADMIN — Medication 50 MILLIGRAM(S): at 13:44

## 2025-08-28 RX ADMIN — Medication 1 TABLET(S): at 11:52

## 2025-08-28 RX ADMIN — Medication 1 TABLET(S): at 11:53

## 2025-08-28 RX ADMIN — APIXABAN 2.5 MILLIGRAM(S): 2.5 TABLET, FILM COATED ORAL at 05:43

## 2025-08-28 RX ADMIN — Medication 100 MILLIEQUIVALENT(S): at 13:44

## 2025-08-28 RX ADMIN — DILTIAZEM HYDROCHLORIDE 60 MILLIGRAM(S): 240 TABLET, EXTENDED RELEASE ORAL at 11:52

## 2025-08-28 RX ADMIN — MIRTAZAPINE 7.5 MILLIGRAM(S): 30 TABLET, FILM COATED ORAL at 11:52

## 2025-08-28 RX ADMIN — Medication 50 MILLIGRAM(S): at 05:43

## 2025-08-28 RX ADMIN — Medication 100 MILLIEQUIVALENT(S): at 15:40

## 2025-08-28 RX ADMIN — Medication 25 GRAM(S): at 13:44

## 2025-08-28 RX ADMIN — GABAPENTIN 400 MILLIGRAM(S): 400 CAPSULE ORAL at 17:03

## 2025-08-28 RX ADMIN — DILTIAZEM HYDROCHLORIDE 60 MILLIGRAM(S): 240 TABLET, EXTENDED RELEASE ORAL at 17:03

## 2025-08-28 RX ADMIN — Medication 25 GRAM(S): at 22:23

## 2025-08-29 ENCOUNTER — TRANSCRIPTION ENCOUNTER (OUTPATIENT)
Age: 75
End: 2025-08-29

## 2025-08-29 VITALS
RESPIRATION RATE: 18 BRPM | SYSTOLIC BLOOD PRESSURE: 136 MMHG | TEMPERATURE: 98 F | OXYGEN SATURATION: 96 % | DIASTOLIC BLOOD PRESSURE: 74 MMHG | HEART RATE: 96 BPM

## 2025-08-29 LAB
ANION GAP SERPL CALC-SCNC: 6 MMOL/L — SIGNIFICANT CHANGE UP (ref 5–17)
BUN SERPL-MCNC: 8 MG/DL — SIGNIFICANT CHANGE UP (ref 7–23)
CALCIUM SERPL-MCNC: 8.9 MG/DL — SIGNIFICANT CHANGE UP (ref 8.5–10.1)
CHLORIDE SERPL-SCNC: 114 MMOL/L — HIGH (ref 96–108)
CO2 SERPL-SCNC: 22 MMOL/L — SIGNIFICANT CHANGE UP (ref 22–31)
CREAT SERPL-MCNC: 1.1 MG/DL — SIGNIFICANT CHANGE UP (ref 0.5–1.3)
CULTURE RESULTS: SIGNIFICANT CHANGE UP
CULTURE RESULTS: SIGNIFICANT CHANGE UP
EGFR: 71 ML/MIN/1.73M2 — SIGNIFICANT CHANGE UP
EGFR: 71 ML/MIN/1.73M2 — SIGNIFICANT CHANGE UP
GLUCOSE SERPL-MCNC: 108 MG/DL — HIGH (ref 70–99)
HCT VFR BLD CALC: 33.3 % — LOW (ref 39–50)
HGB BLD-MCNC: 10.3 G/DL — LOW (ref 13–17)
MAGNESIUM SERPL-MCNC: 1.8 MG/DL — SIGNIFICANT CHANGE UP (ref 1.6–2.6)
MCHC RBC-ENTMCNC: 21.2 PG — LOW (ref 27–34)
MCHC RBC-ENTMCNC: 30.9 G/DL — LOW (ref 32–36)
MCV RBC AUTO: 68.5 FL — LOW (ref 80–100)
NRBC # BLD AUTO: 0 K/UL — SIGNIFICANT CHANGE UP (ref 0–0)
NRBC # FLD: 0 K/UL — SIGNIFICANT CHANGE UP (ref 0–0)
PHOSPHATE SERPL-MCNC: 2.8 MG/DL — SIGNIFICANT CHANGE UP (ref 2.5–4.5)
PLATELET # BLD AUTO: 215 K/UL — SIGNIFICANT CHANGE UP (ref 150–400)
PMV BLD: SIGNIFICANT CHANGE UP FL (ref 7–13)
POTASSIUM SERPL-MCNC: 3.9 MMOL/L — SIGNIFICANT CHANGE UP (ref 3.5–5.3)
POTASSIUM SERPL-SCNC: 3.9 MMOL/L — SIGNIFICANT CHANGE UP (ref 3.5–5.3)
RBC # BLD: 4.86 M/UL — SIGNIFICANT CHANGE UP (ref 4.2–5.8)
RBC # FLD: 20.5 % — HIGH (ref 10.3–14.5)
SODIUM SERPL-SCNC: 142 MMOL/L — SIGNIFICANT CHANGE UP (ref 135–145)
SPECIMEN SOURCE: SIGNIFICANT CHANGE UP
SPECIMEN SOURCE: SIGNIFICANT CHANGE UP
WBC # BLD: 6.99 K/UL — SIGNIFICANT CHANGE UP (ref 3.8–10.5)
WBC # FLD AUTO: 6.99 K/UL — SIGNIFICANT CHANGE UP (ref 3.8–10.5)

## 2025-08-29 PROCEDURE — 85027 COMPLETE CBC AUTOMATED: CPT

## 2025-08-29 PROCEDURE — 36415 COLL VENOUS BLD VENIPUNCTURE: CPT

## 2025-08-29 PROCEDURE — 84100 ASSAY OF PHOSPHORUS: CPT

## 2025-08-29 PROCEDURE — 83605 ASSAY OF LACTIC ACID: CPT

## 2025-08-29 PROCEDURE — 82962 GLUCOSE BLOOD TEST: CPT

## 2025-08-29 PROCEDURE — 85610 PROTHROMBIN TIME: CPT

## 2025-08-29 PROCEDURE — 99285 EMERGENCY DEPT VISIT HI MDM: CPT | Mod: 25

## 2025-08-29 PROCEDURE — 87040 BLOOD CULTURE FOR BACTERIA: CPT

## 2025-08-29 PROCEDURE — 96365 THER/PROPH/DIAG IV INF INIT: CPT

## 2025-08-29 PROCEDURE — 93005 ELECTROCARDIOGRAM TRACING: CPT

## 2025-08-29 PROCEDURE — 85025 COMPLETE CBC W/AUTO DIFF WBC: CPT

## 2025-08-29 PROCEDURE — 99239 HOSP IP/OBS DSCHRG MGMT >30: CPT

## 2025-08-29 PROCEDURE — 71045 X-RAY EXAM CHEST 1 VIEW: CPT

## 2025-08-29 PROCEDURE — 80053 COMPREHEN METABOLIC PANEL: CPT

## 2025-08-29 PROCEDURE — 83735 ASSAY OF MAGNESIUM: CPT

## 2025-08-29 PROCEDURE — 87637 SARSCOV2&INF A&B&RSV AMP PRB: CPT

## 2025-08-29 PROCEDURE — 99232 SBSQ HOSP IP/OBS MODERATE 35: CPT

## 2025-08-29 PROCEDURE — 87086 URINE CULTURE/COLONY COUNT: CPT

## 2025-08-29 PROCEDURE — 83036 HEMOGLOBIN GLYCOSYLATED A1C: CPT

## 2025-08-29 PROCEDURE — 85730 THROMBOPLASTIN TIME PARTIAL: CPT

## 2025-08-29 PROCEDURE — 71250 CT THORAX DX C-: CPT

## 2025-08-29 PROCEDURE — 81001 URINALYSIS AUTO W/SCOPE: CPT

## 2025-08-29 PROCEDURE — 80048 BASIC METABOLIC PNL TOTAL CA: CPT

## 2025-08-29 RX ORDER — MODAFINIL 200 MG/1
1 TABLET ORAL
Qty: 30 | Refills: 0
Start: 2025-08-29

## 2025-08-29 RX ORDER — MODAFINIL 200 MG/1
1 TABLET ORAL
Refills: 0 | DISCHARGE

## 2025-08-29 RX ADMIN — BRIMONIDINE TARTRATE 1 DROP(S): 1.5 SOLUTION/ DROPS OPHTHALMIC at 05:42

## 2025-08-29 RX ADMIN — Medication 50 MILLIGRAM(S): at 14:51

## 2025-08-29 RX ADMIN — GABAPENTIN 400 MILLIGRAM(S): 400 CAPSULE ORAL at 05:41

## 2025-08-29 RX ADMIN — BRIMONIDINE TARTRATE 1 DROP(S): 1.5 SOLUTION/ DROPS OPHTHALMIC at 14:51

## 2025-08-29 RX ADMIN — Medication 1000 UNIT(S): at 11:21

## 2025-08-29 RX ADMIN — Medication 40 MILLIGRAM(S): at 05:43

## 2025-08-29 RX ADMIN — Medication 1 TABLET(S): at 11:21

## 2025-08-29 RX ADMIN — MODAFINIL 100 MILLIGRAM(S): 200 TABLET ORAL at 05:56

## 2025-08-29 RX ADMIN — MIRTAZAPINE 7.5 MILLIGRAM(S): 30 TABLET, FILM COATED ORAL at 11:21

## 2025-08-29 RX ADMIN — DILTIAZEM HYDROCHLORIDE 60 MILLIGRAM(S): 240 TABLET, EXTENDED RELEASE ORAL at 11:21

## 2025-08-29 RX ADMIN — APIXABAN 2.5 MILLIGRAM(S): 2.5 TABLET, FILM COATED ORAL at 05:43

## 2025-08-29 RX ADMIN — Medication 25 GRAM(S): at 14:51

## 2025-08-29 RX ADMIN — Medication 25 GRAM(S): at 05:43

## 2025-09-08 ENCOUNTER — RX RENEWAL (OUTPATIENT)
Age: 75
End: 2025-09-08

## 2025-09-16 RX ORDER — TRIAMCINOLONE ACETONIDE 200 MG/5ML
1 INJECTION, SUSPENSION INTRA-ARTICULAR; INTRAMUSCULAR
Refills: 0 | DISCHARGE

## 2025-09-16 RX ORDER — BRIMONIDINE TARTRATE 1.5 MG/ML
1 SOLUTION/ DROPS OPHTHALMIC
Refills: 0 | DISCHARGE

## 2025-09-16 RX ORDER — SENNA 187 MG
1 TABLET ORAL
Refills: 0 | DISCHARGE

## 2025-09-16 RX ORDER — FLUOROURACIL 50 MG/ML
1 INJECTION, SOLUTION INTRAVENOUS
Refills: 0 | DISCHARGE

## 2025-09-16 RX ORDER — B1/B2/B3/B5/B6/B12/VIT C/FOLIC 500-0.5 MG
1 TABLET ORAL
Refills: 0 | DISCHARGE

## 2025-09-16 RX ORDER — ALBUTEROL SULFATE 2.5 MG/3ML
3 VIAL, NEBULIZER (ML) INHALATION
Qty: 0 | Refills: 0 | DISCHARGE

## 2025-09-17 ENCOUNTER — TRANSCRIPTION ENCOUNTER (OUTPATIENT)
Age: 75
End: 2025-09-17

## 2025-09-20 ENCOUNTER — TRANSCRIPTION ENCOUNTER (OUTPATIENT)
Age: 75
End: 2025-09-20